# Patient Record
Sex: MALE | Race: WHITE | Employment: OTHER | URBAN - NONMETROPOLITAN AREA
[De-identification: names, ages, dates, MRNs, and addresses within clinical notes are randomized per-mention and may not be internally consistent; named-entity substitution may affect disease eponyms.]

---

## 2017-03-31 ENCOUNTER — HOSPITAL ENCOUNTER (OUTPATIENT)
Dept: GENERAL RADIOLOGY | Age: 77
Discharge: HOME | End: 2017-03-31
Payer: MEDICARE

## 2017-03-31 DIAGNOSIS — M25.562 LEFT KNEE PAIN, UNSPECIFIED CHRONICITY: ICD-10-CM

## 2017-03-31 PROCEDURE — 73562 X-RAY EXAM OF KNEE 3: CPT

## 2017-06-03 ENCOUNTER — APPOINTMENT (OUTPATIENT)
Dept: CT IMAGING | Age: 77
DRG: 392 | End: 2017-06-03
Payer: MEDICARE

## 2017-06-03 ENCOUNTER — HOSPITAL ENCOUNTER (INPATIENT)
Age: 77
LOS: 6 days | Discharge: HOME | DRG: 392 | End: 2017-06-09
Attending: EMERGENCY MEDICINE | Admitting: SURGERY
Payer: MEDICARE

## 2017-06-03 DIAGNOSIS — R10.84 GENERALIZED ABDOMINAL PAIN: Primary | ICD-10-CM

## 2017-06-03 DIAGNOSIS — K66.8 FREE INTRAPERITONEAL AIR: ICD-10-CM

## 2017-06-03 LAB
ALBUMIN SERPL-MCNC: 4.5 G/DL (ref 3.5–5.2)
ALP BLD-CCNC: 71 U/L (ref 40–130)
ALT SERPL-CCNC: 20 U/L (ref 5–41)
ANION GAP SERPL CALCULATED.3IONS-SCNC: 18 MMOL/L (ref 7–19)
AST SERPL-CCNC: 22 U/L (ref 5–40)
BASOPHILS ABSOLUTE: 0.1 K/UL (ref 0–0.2)
BASOPHILS RELATIVE PERCENT: 0.5 % (ref 0–1)
BILIRUB SERPL-MCNC: 0.7 MG/DL (ref 0.2–1.2)
BILIRUBIN URINE: NEGATIVE
BLOOD, URINE: NEGATIVE
BUN BLDV-MCNC: 31 MG/DL (ref 8–23)
CALCIUM SERPL-MCNC: 9.6 MG/DL (ref 8.8–10.2)
CHLORIDE BLD-SCNC: 95 MMOL/L (ref 98–111)
CLARITY: CLEAR
CO2: 25 MMOL/L (ref 22–29)
COLOR: YELLOW
CREAT SERPL-MCNC: 1.4 MG/DL (ref 0.5–1.2)
EOSINOPHILS ABSOLUTE: 0 K/UL (ref 0–0.6)
EOSINOPHILS RELATIVE PERCENT: 0 % (ref 0–5)
GFR NON-AFRICAN AMERICAN: 49
GLUCOSE BLD-MCNC: 180 MG/DL (ref 74–109)
GLUCOSE URINE: NEGATIVE MG/DL
HCT VFR BLD CALC: 49.3 % (ref 42–52)
HEMOGLOBIN: 16.7 G/DL (ref 14–18)
KETONES, URINE: NEGATIVE MG/DL
LEUKOCYTE ESTERASE, URINE: NEGATIVE
LYMPHOCYTES ABSOLUTE: 1 K/UL (ref 1.1–4.5)
LYMPHOCYTES RELATIVE PERCENT: 6.8 % (ref 20–40)
MCH RBC QN AUTO: 30.6 PG (ref 27–31)
MCHC RBC AUTO-ENTMCNC: 33.9 G/DL (ref 33–37)
MCV RBC AUTO: 90.5 FL (ref 80–94)
MONOCYTES ABSOLUTE: 0.6 K/UL (ref 0–0.9)
MONOCYTES RELATIVE PERCENT: 4.3 % (ref 0–10)
NEUTROPHILS ABSOLUTE: 12.2 K/UL (ref 1.5–7.5)
NEUTROPHILS RELATIVE PERCENT: 88 % (ref 50–65)
NITRITE, URINE: NEGATIVE
PDW BLD-RTO: 12.1 % (ref 11.5–14.5)
PH UA: 7
PLATELET # BLD: 176 K/UL (ref 130–400)
PMV BLD AUTO: 11.7 FL (ref 9.4–12.4)
POTASSIUM SERPL-SCNC: 3.5 MMOL/L (ref 3.5–5)
PROTEIN UA: ABNORMAL MG/DL
RBC # BLD: 5.45 M/UL (ref 4.7–6.1)
SODIUM BLD-SCNC: 138 MMOL/L (ref 136–145)
SPECIFIC GRAVITY UA: 1.02
TOTAL PROTEIN: 7.8 G/DL (ref 6.6–8.7)
UROBILINOGEN, URINE: 1 E.U./DL
WBC # BLD: 13.9 K/UL (ref 4.8–10.8)

## 2017-06-03 PROCEDURE — 94762 N-INVAS EAR/PLS OXIMTRY CONT: CPT

## 2017-06-03 PROCEDURE — 6360000002 HC RX W HCPCS: Performed by: SURGERY

## 2017-06-03 PROCEDURE — 2700000000 HC OXYGEN THERAPY PER DAY

## 2017-06-03 PROCEDURE — 80053 COMPREHEN METABOLIC PANEL: CPT

## 2017-06-03 PROCEDURE — 2580000003 HC RX 258: Performed by: EMERGENCY MEDICINE

## 2017-06-03 PROCEDURE — 36415 COLL VENOUS BLD VENIPUNCTURE: CPT

## 2017-06-03 PROCEDURE — 99223 1ST HOSP IP/OBS HIGH 75: CPT | Performed by: SURGERY

## 2017-06-03 PROCEDURE — 6370000000 HC RX 637 (ALT 250 FOR IP): Performed by: SURGERY

## 2017-06-03 PROCEDURE — 85025 COMPLETE CBC W/AUTO DIFF WBC: CPT

## 2017-06-03 PROCEDURE — 81003 URINALYSIS AUTO W/O SCOPE: CPT

## 2017-06-03 PROCEDURE — 74176 CT ABD & PELVIS W/O CONTRAST: CPT

## 2017-06-03 PROCEDURE — 2500000003 HC RX 250 WO HCPCS: Performed by: EMERGENCY MEDICINE

## 2017-06-03 PROCEDURE — 6360000002 HC RX W HCPCS: Performed by: EMERGENCY MEDICINE

## 2017-06-03 PROCEDURE — 96375 TX/PRO/DX INJ NEW DRUG ADDON: CPT

## 2017-06-03 PROCEDURE — 99284 EMERGENCY DEPT VISIT MOD MDM: CPT | Performed by: EMERGENCY MEDICINE

## 2017-06-03 PROCEDURE — 96374 THER/PROPH/DIAG INJ IV PUSH: CPT

## 2017-06-03 PROCEDURE — 99285 EMERGENCY DEPT VISIT HI MDM: CPT

## 2017-06-03 PROCEDURE — 1210000000 HC MED SURG R&B

## 2017-06-03 RX ORDER — SODIUM CHLORIDE 9 MG/ML
INJECTION, SOLUTION INTRAVENOUS CONTINUOUS
Status: DISCONTINUED | OUTPATIENT
Start: 2017-06-03 | End: 2017-06-08

## 2017-06-03 RX ORDER — NALOXONE HYDROCHLORIDE 0.4 MG/ML
INJECTION, SOLUTION INTRAMUSCULAR; INTRAVENOUS; SUBCUTANEOUS
Status: DISPENSED
Start: 2017-06-03 | End: 2017-06-04

## 2017-06-03 RX ORDER — ONDANSETRON 2 MG/ML
4 INJECTION INTRAMUSCULAR; INTRAVENOUS ONCE
Status: COMPLETED | OUTPATIENT
Start: 2017-06-03 | End: 2017-06-03

## 2017-06-03 RX ORDER — PANTOPRAZOLE SODIUM 40 MG/1
40 TABLET, DELAYED RELEASE ORAL DAILY
COMMUNITY
End: 2018-12-11

## 2017-06-03 RX ORDER — SODIUM CHLORIDE 0.9 % (FLUSH) 0.9 %
10 SYRINGE (ML) INJECTION EVERY 12 HOURS SCHEDULED
Status: DISCONTINUED | OUTPATIENT
Start: 2017-06-03 | End: 2017-06-09 | Stop reason: HOSPADM

## 2017-06-03 RX ORDER — PROMETHAZINE HYDROCHLORIDE 25 MG/ML
6.25 INJECTION, SOLUTION INTRAMUSCULAR; INTRAVENOUS EVERY 4 HOURS PRN
Status: DISCONTINUED | OUTPATIENT
Start: 2017-06-03 | End: 2017-06-03

## 2017-06-03 RX ORDER — METOPROLOL SUCCINATE 100 MG/1
100 TABLET, EXTENDED RELEASE ORAL DAILY
Status: ON HOLD | COMMUNITY
End: 2021-01-29 | Stop reason: HOSPADM

## 2017-06-03 RX ORDER — SODIUM CHLORIDE 0.9 % (FLUSH) 0.9 %
10 SYRINGE (ML) INJECTION PRN
Status: DISCONTINUED | OUTPATIENT
Start: 2017-06-03 | End: 2017-06-09 | Stop reason: HOSPADM

## 2017-06-03 RX ORDER — METRONIDAZOLE 500 MG/1
500 TABLET ORAL EVERY 8 HOURS SCHEDULED
Status: DISCONTINUED | OUTPATIENT
Start: 2017-06-03 | End: 2017-06-09 | Stop reason: HOSPADM

## 2017-06-03 RX ORDER — PROMETHAZINE HYDROCHLORIDE 25 MG/ML
12.5 INJECTION, SOLUTION INTRAMUSCULAR; INTRAVENOUS EVERY 4 HOURS PRN
Status: DISCONTINUED | OUTPATIENT
Start: 2017-06-03 | End: 2017-06-03

## 2017-06-03 RX ORDER — NIFEDIPINE 60 MG/1
60 TABLET, EXTENDED RELEASE ORAL DAILY
Status: ON HOLD | COMMUNITY
End: 2021-01-29 | Stop reason: HOSPADM

## 2017-06-03 RX ORDER — ONDANSETRON 2 MG/ML
4 INJECTION INTRAMUSCULAR; INTRAVENOUS EVERY 6 HOURS PRN
Status: DISCONTINUED | OUTPATIENT
Start: 2017-06-03 | End: 2017-06-09 | Stop reason: HOSPADM

## 2017-06-03 RX ORDER — PROMETHAZINE HYDROCHLORIDE 25 MG/ML
12.5 INJECTION, SOLUTION INTRAMUSCULAR; INTRAVENOUS EVERY 6 HOURS PRN
Status: DISCONTINUED | OUTPATIENT
Start: 2017-06-03 | End: 2017-06-03

## 2017-06-03 RX ORDER — ACETAMINOPHEN 325 MG/1
650 TABLET ORAL EVERY 4 HOURS PRN
Status: DISCONTINUED | OUTPATIENT
Start: 2017-06-03 | End: 2017-06-09 | Stop reason: HOSPADM

## 2017-06-03 RX ORDER — HYDROCHLOROTHIAZIDE 25 MG/1
25 TABLET ORAL DAILY
COMMUNITY
End: 2017-07-06 | Stop reason: ALTCHOICE

## 2017-06-03 RX ORDER — KETOROLAC TROMETHAMINE 30 MG/ML
30 INJECTION, SOLUTION INTRAMUSCULAR; INTRAVENOUS ONCE
Status: COMPLETED | OUTPATIENT
Start: 2017-06-03 | End: 2017-06-03

## 2017-06-03 RX ADMIN — METRONIDAZOLE 500 MG: 500 TABLET ORAL at 22:42

## 2017-06-03 RX ADMIN — ONDANSETRON 4 MG: 2 INJECTION INTRAMUSCULAR; INTRAVENOUS at 14:04

## 2017-06-03 RX ADMIN — PROMETHAZINE HYDROCHLORIDE 12.5 MG: 25 INJECTION, SOLUTION INTRAMUSCULAR; INTRAVENOUS at 15:36

## 2017-06-03 RX ADMIN — HYDROMORPHONE HYDROCHLORIDE 0.5 MG: 1 INJECTION, SOLUTION INTRAMUSCULAR; INTRAVENOUS; SUBCUTANEOUS at 15:17

## 2017-06-03 RX ADMIN — HYDROMORPHONE HYDROCHLORIDE 0.5 MG: 1 INJECTION, SOLUTION INTRAMUSCULAR; INTRAVENOUS; SUBCUTANEOUS at 14:04

## 2017-06-03 RX ADMIN — SODIUM CHLORIDE 1000 MG: 9 INJECTION, SOLUTION INTRAVENOUS at 18:30

## 2017-06-03 RX ADMIN — ENOXAPARIN SODIUM 40 MG: 40 INJECTION SUBCUTANEOUS at 19:08

## 2017-06-03 RX ADMIN — KETOROLAC TROMETHAMINE 30 MG: 30 INJECTION, SOLUTION INTRAMUSCULAR at 11:40

## 2017-06-03 RX ADMIN — SODIUM CHLORIDE: 9 INJECTION, SOLUTION INTRAVENOUS at 22:42

## 2017-06-03 RX ADMIN — ONDANSETRON 4 MG: 2 INJECTION INTRAMUSCULAR; INTRAVENOUS at 11:40

## 2017-06-03 RX ADMIN — SODIUM CHLORIDE: 9 INJECTION, SOLUTION INTRAVENOUS at 13:06

## 2017-06-03 RX ADMIN — METRONIDAZOLE 500 MG: 500 INJECTION, SOLUTION INTRAVENOUS at 14:05

## 2017-06-03 ASSESSMENT — PAIN DESCRIPTION - DIRECTION: RADIATING_TOWARDS: TESTICLES

## 2017-06-03 ASSESSMENT — PAIN DESCRIPTION - LOCATION: LOCATION: ABDOMEN;GROIN

## 2017-06-03 ASSESSMENT — PAIN SCALES - GENERAL
PAINLEVEL_OUTOF10: 10
PAINLEVEL_OUTOF10: 10
PAINLEVEL_OUTOF10: 0
PAINLEVEL_OUTOF10: 10

## 2017-06-03 ASSESSMENT — PAIN DESCRIPTION - FREQUENCY: FREQUENCY: CONTINUOUS

## 2017-06-03 ASSESSMENT — PAIN DESCRIPTION - PROGRESSION: CLINICAL_PROGRESSION: NOT CHANGED

## 2017-06-03 ASSESSMENT — ENCOUNTER SYMPTOMS
CONSTIPATION: 1
ABDOMINAL PAIN: 1

## 2017-06-03 ASSESSMENT — PAIN DESCRIPTION - ORIENTATION: ORIENTATION: LOWER

## 2017-06-03 ASSESSMENT — PAIN DESCRIPTION - ONSET: ONSET: SUDDEN

## 2017-06-03 ASSESSMENT — PAIN DESCRIPTION - PAIN TYPE: TYPE: ACUTE PAIN

## 2017-06-04 PROBLEM — K57.20 DIVERTICULITIS OF LARGE INTESTINE WITH PERFORATION WITHOUT BLEEDING: Status: ACTIVE | Noted: 2017-06-04

## 2017-06-04 LAB
ALBUMIN SERPL-MCNC: 3.2 G/DL (ref 3.5–5.2)
ALP BLD-CCNC: 41 U/L (ref 40–130)
ALT SERPL-CCNC: 15 U/L (ref 5–41)
ANION GAP SERPL CALCULATED.3IONS-SCNC: 18 MMOL/L (ref 7–19)
AST SERPL-CCNC: 24 U/L (ref 5–40)
BASOPHILS ABSOLUTE: 0 K/UL (ref 0–0.2)
BASOPHILS MANUAL: 0 %
BASOPHILS RELATIVE PERCENT: 0 % (ref 0–1)
BILIRUB SERPL-MCNC: 0.9 MG/DL (ref 0.2–1.2)
BUN BLDV-MCNC: 37 MG/DL (ref 8–23)
CALCIUM SERPL-MCNC: 8.2 MG/DL (ref 8.8–10.2)
CHLORIDE BLD-SCNC: 99 MMOL/L (ref 98–111)
CO2: 22 MMOL/L (ref 22–29)
CREAT SERPL-MCNC: 1.8 MG/DL (ref 0.5–1.2)
EOSINOPHILS ABSOLUTE: 0 K/UL (ref 0–0.6)
EOSINOPHILS RELATIVE PERCENT: 0 % (ref 0–5)
GFR NON-AFRICAN AMERICAN: 37
GLUCOSE BLD-MCNC: 154 MG/DL (ref 74–109)
HCT VFR BLD CALC: 42.2 % (ref 42–52)
HEMOGLOBIN: 14.3 G/DL (ref 14–18)
LYMPHOCYTES ABSOLUTE: 0.8 K/UL (ref 1.1–4.5)
LYMPHOCYTES RELATIVE PERCENT: 8 % (ref 20–40)
MCH RBC QN AUTO: 31.2 PG (ref 27–31)
MCHC RBC AUTO-ENTMCNC: 33.9 G/DL (ref 33–37)
MCV RBC AUTO: 92.1 FL (ref 80–94)
MONOCYTES ABSOLUTE: 0.6 K/UL (ref 0–0.9)
MONOCYTES RELATIVE PERCENT: 6 % (ref 0–10)
NEUTROPHILS ABSOLUTE: 8.7 K/UL (ref 1.5–7.5)
NEUTROPHILS MANUAL: 86 %
NEUTROPHILS RELATIVE PERCENT: 86 % (ref 50–65)
PDW BLD-RTO: 12.4 % (ref 11.5–14.5)
PLATELET # BLD: 163 K/UL (ref 130–400)
PLATELET SLIDE REVIEW: ADEQUATE
PMV BLD AUTO: 12.7 FL (ref 9.4–12.4)
POTASSIUM SERPL-SCNC: 3.6 MMOL/L (ref 3.5–5)
RBC # BLD: 4.58 M/UL (ref 4.7–6.1)
SODIUM BLD-SCNC: 139 MMOL/L (ref 136–145)
TOTAL PROTEIN: 5.7 G/DL (ref 6.6–8.7)
WBC # BLD: 10.1 K/UL (ref 4.8–10.8)

## 2017-06-04 PROCEDURE — 6360000002 HC RX W HCPCS: Performed by: FAMILY MEDICINE

## 2017-06-04 PROCEDURE — 85025 COMPLETE CBC W/AUTO DIFF WBC: CPT

## 2017-06-04 PROCEDURE — 99232 SBSQ HOSP IP/OBS MODERATE 35: CPT | Performed by: SURGERY

## 2017-06-04 PROCEDURE — C9113 INJ PANTOPRAZOLE SODIUM, VIA: HCPCS | Performed by: FAMILY MEDICINE

## 2017-06-04 PROCEDURE — 6360000002 HC RX W HCPCS: Performed by: EMERGENCY MEDICINE

## 2017-06-04 PROCEDURE — 2580000003 HC RX 258: Performed by: EMERGENCY MEDICINE

## 2017-06-04 PROCEDURE — 80053 COMPREHEN METABOLIC PANEL: CPT

## 2017-06-04 PROCEDURE — 36415 COLL VENOUS BLD VENIPUNCTURE: CPT

## 2017-06-04 PROCEDURE — 1210000000 HC MED SURG R&B

## 2017-06-04 PROCEDURE — 2580000003 HC RX 258: Performed by: FAMILY MEDICINE

## 2017-06-04 PROCEDURE — 94762 N-INVAS EAR/PLS OXIMTRY CONT: CPT

## 2017-06-04 PROCEDURE — 2700000000 HC OXYGEN THERAPY PER DAY

## 2017-06-04 PROCEDURE — 6370000000 HC RX 637 (ALT 250 FOR IP): Performed by: SURGERY

## 2017-06-04 PROCEDURE — 6360000002 HC RX W HCPCS: Performed by: SURGERY

## 2017-06-04 PROCEDURE — 2580000003 HC RX 258: Performed by: SURGERY

## 2017-06-04 RX ORDER — 0.9 % SODIUM CHLORIDE 0.9 %
1000 INTRAVENOUS SOLUTION INTRAVENOUS ONCE
Status: COMPLETED | OUTPATIENT
Start: 2017-06-04 | End: 2017-06-04

## 2017-06-04 RX ORDER — PANTOPRAZOLE SODIUM 40 MG/10ML
40 INJECTION, POWDER, LYOPHILIZED, FOR SOLUTION INTRAVENOUS DAILY
Status: DISCONTINUED | OUTPATIENT
Start: 2017-06-04 | End: 2017-06-05

## 2017-06-04 RX ORDER — METOPROLOL TARTRATE 5 MG/5ML
2.5 INJECTION INTRAVENOUS EVERY 6 HOURS PRN
Status: DISCONTINUED | OUTPATIENT
Start: 2017-06-04 | End: 2017-06-09 | Stop reason: HOSPADM

## 2017-06-04 RX ADMIN — ONDANSETRON 4 MG: 2 INJECTION INTRAMUSCULAR; INTRAVENOUS at 01:55

## 2017-06-04 RX ADMIN — PANTOPRAZOLE SODIUM 40 MG: 40 INJECTION, POWDER, FOR SOLUTION INTRAVENOUS at 13:45

## 2017-06-04 RX ADMIN — HYDROMORPHONE HYDROCHLORIDE 0.5 MG: 1 INJECTION, SOLUTION INTRAMUSCULAR; INTRAVENOUS; SUBCUTANEOUS at 01:56

## 2017-06-04 RX ADMIN — METRONIDAZOLE 500 MG: 500 TABLET ORAL at 13:38

## 2017-06-04 RX ADMIN — ENOXAPARIN SODIUM 40 MG: 40 INJECTION SUBCUTANEOUS at 20:35

## 2017-06-04 RX ADMIN — SODIUM CHLORIDE 1000 MG: 9 INJECTION, SOLUTION INTRAVENOUS at 17:03

## 2017-06-04 RX ADMIN — METRONIDAZOLE 500 MG: 500 TABLET ORAL at 05:55

## 2017-06-04 RX ADMIN — HYDROMORPHONE HYDROCHLORIDE 0.5 MG: 1 INJECTION, SOLUTION INTRAMUSCULAR; INTRAVENOUS; SUBCUTANEOUS at 13:39

## 2017-06-04 RX ADMIN — METRONIDAZOLE 500 MG: 500 TABLET ORAL at 22:00

## 2017-06-04 RX ADMIN — ONDANSETRON 4 MG: 2 INJECTION INTRAMUSCULAR; INTRAVENOUS at 20:35

## 2017-06-04 RX ADMIN — SODIUM CHLORIDE 1000 ML: 9 INJECTION, SOLUTION INTRAVENOUS at 10:00

## 2017-06-04 RX ADMIN — HYDROMORPHONE HYDROCHLORIDE 0.5 MG: 1 INJECTION, SOLUTION INTRAMUSCULAR; INTRAVENOUS; SUBCUTANEOUS at 07:37

## 2017-06-04 RX ADMIN — SODIUM CHLORIDE: 9 INJECTION, SOLUTION INTRAVENOUS at 07:38

## 2017-06-04 RX ADMIN — HYDROMORPHONE HYDROCHLORIDE 0.5 MG: 1 INJECTION, SOLUTION INTRAMUSCULAR; INTRAVENOUS; SUBCUTANEOUS at 20:35

## 2017-06-04 ASSESSMENT — PAIN DESCRIPTION - ORIENTATION: ORIENTATION: RIGHT

## 2017-06-04 ASSESSMENT — PAIN SCALES - GENERAL
PAINLEVEL_OUTOF10: 6
PAINLEVEL_OUTOF10: 3
PAINLEVEL_OUTOF10: 3
PAINLEVEL_OUTOF10: 6
PAINLEVEL_OUTOF10: 5
PAINLEVEL_OUTOF10: 4

## 2017-06-04 ASSESSMENT — PAIN DESCRIPTION - LOCATION: LOCATION: ABDOMEN

## 2017-06-04 ASSESSMENT — PAIN DESCRIPTION - PAIN TYPE: TYPE: ACUTE PAIN

## 2017-06-05 ENCOUNTER — APPOINTMENT (OUTPATIENT)
Dept: ULTRASOUND IMAGING | Age: 77
DRG: 392 | End: 2017-06-05
Payer: MEDICARE

## 2017-06-05 LAB
ALBUMIN SERPL-MCNC: 3.1 G/DL (ref 3.5–5.2)
ALP BLD-CCNC: 46 U/L (ref 40–130)
ALT SERPL-CCNC: 22 U/L (ref 5–41)
ANION GAP SERPL CALCULATED.3IONS-SCNC: 17 MMOL/L (ref 7–19)
AST SERPL-CCNC: 97 U/L (ref 5–40)
BACTERIA: NEGATIVE /HPF
BASOPHILS ABSOLUTE: 0.1 K/UL (ref 0–0.2)
BASOPHILS RELATIVE PERCENT: 0.4 % (ref 0–1)
BILIRUB SERPL-MCNC: 0.7 MG/DL (ref 0.2–1.2)
BILIRUBIN URINE: ABNORMAL
BLOOD, URINE: ABNORMAL
BUN BLDV-MCNC: 57 MG/DL (ref 8–23)
CALCIUM SERPL-MCNC: 8.1 MG/DL (ref 8.8–10.2)
CHLORIDE BLD-SCNC: 97 MMOL/L (ref 98–111)
CLARITY: CLEAR
CO2: 25 MMOL/L (ref 22–29)
COLOR: ABNORMAL
CREAT SERPL-MCNC: 2.8 MG/DL (ref 0.5–1.2)
CREATININE URINE: 159.8 MG/DL (ref 4.2–622)
EOSINOPHIL,URINE: NORMAL
EOSINOPHILS ABSOLUTE: 0 K/UL (ref 0–0.6)
EOSINOPHILS RELATIVE PERCENT: 0 % (ref 0–5)
EPITHELIAL CELLS, UA: 0 /HPF (ref 0–5)
GFR NON-AFRICAN AMERICAN: 22
GLUCOSE BLD-MCNC: 135 MG/DL (ref 74–109)
GLUCOSE URINE: NEGATIVE MG/DL
HCT VFR BLD CALC: 45 % (ref 42–52)
HEMOGLOBIN: 14.6 G/DL (ref 14–18)
HYALINE CASTS: 1 /HPF (ref 0–8)
KETONES, URINE: NEGATIVE MG/DL
LEUKOCYTE ESTERASE, URINE: NEGATIVE
LYMPHOCYTES ABSOLUTE: 0.7 K/UL (ref 1.1–4.5)
LYMPHOCYTES RELATIVE PERCENT: 5.7 % (ref 20–40)
MCH RBC QN AUTO: 31.1 PG (ref 27–31)
MCHC RBC AUTO-ENTMCNC: 32.4 G/DL (ref 33–37)
MCV RBC AUTO: 95.9 FL (ref 80–94)
MONOCYTES ABSOLUTE: 0.4 K/UL (ref 0–0.9)
MONOCYTES RELATIVE PERCENT: 2.9 % (ref 0–10)
NEUTROPHILS ABSOLUTE: 11.6 K/UL (ref 1.5–7.5)
NEUTROPHILS RELATIVE PERCENT: 90 % (ref 50–65)
NITRITE, URINE: NEGATIVE
PDW BLD-RTO: 12.7 % (ref 11.5–14.5)
PH UA: 5.5
PLATELET # BLD: 143 K/UL (ref 130–400)
PMV BLD AUTO: 12.8 FL (ref 9.4–12.4)
POTASSIUM SERPL-SCNC: 4.3 MMOL/L (ref 3.5–5)
PROTEIN PROTEIN: 63 MG/DL (ref 15–45)
PROTEIN UA: 30 MG/DL
RBC # BLD: 4.69 M/UL (ref 4.7–6.1)
RBC UA: 8 /HPF (ref 0–4)
SODIUM BLD-SCNC: 139 MMOL/L (ref 136–145)
SODIUM URINE: 22 MMOL/L
SPECIFIC GRAVITY UA: 1.02
TOTAL PROTEIN: 6.3 G/DL (ref 6.6–8.7)
UREA NITROGEN, UR: 1113 MG/DL
UROBILINOGEN, URINE: 0.2 E.U./DL
WBC # BLD: 12.9 K/UL (ref 4.8–10.8)
WBC UA: 0 /HPF (ref 0–5)

## 2017-06-05 PROCEDURE — 1210000000 HC MED SURG R&B

## 2017-06-05 PROCEDURE — 85025 COMPLETE CBC W/AUTO DIFF WBC: CPT

## 2017-06-05 PROCEDURE — 76770 US EXAM ABDO BACK WALL COMP: CPT

## 2017-06-05 PROCEDURE — 2580000003 HC RX 258: Performed by: EMERGENCY MEDICINE

## 2017-06-05 PROCEDURE — 80053 COMPREHEN METABOLIC PANEL: CPT

## 2017-06-05 PROCEDURE — 2700000000 HC OXYGEN THERAPY PER DAY

## 2017-06-05 PROCEDURE — C9113 INJ PANTOPRAZOLE SODIUM, VIA: HCPCS | Performed by: SURGERY

## 2017-06-05 PROCEDURE — 84300 ASSAY OF URINE SODIUM: CPT

## 2017-06-05 PROCEDURE — 84156 ASSAY OF PROTEIN URINE: CPT

## 2017-06-05 PROCEDURE — 6370000000 HC RX 637 (ALT 250 FOR IP): Performed by: SURGERY

## 2017-06-05 PROCEDURE — 82570 ASSAY OF URINE CREATININE: CPT

## 2017-06-05 PROCEDURE — 36415 COLL VENOUS BLD VENIPUNCTURE: CPT

## 2017-06-05 PROCEDURE — 2580000003 HC RX 258: Performed by: INTERNAL MEDICINE

## 2017-06-05 PROCEDURE — 99232 SBSQ HOSP IP/OBS MODERATE 35: CPT | Performed by: SURGERY

## 2017-06-05 PROCEDURE — 84540 ASSAY OF URINE/UREA-N: CPT

## 2017-06-05 PROCEDURE — 81001 URINALYSIS AUTO W/SCOPE: CPT

## 2017-06-05 PROCEDURE — 6360000002 HC RX W HCPCS: Performed by: SURGERY

## 2017-06-05 PROCEDURE — 2580000003 HC RX 258: Performed by: SURGERY

## 2017-06-05 PROCEDURE — 89050 BODY FLUID CELL COUNT: CPT

## 2017-06-05 PROCEDURE — 6360000002 HC RX W HCPCS: Performed by: EMERGENCY MEDICINE

## 2017-06-05 RX ORDER — PANTOPRAZOLE SODIUM 40 MG/10ML
40 INJECTION, POWDER, LYOPHILIZED, FOR SOLUTION INTRAVENOUS 2 TIMES DAILY
Status: DISCONTINUED | OUTPATIENT
Start: 2017-06-05 | End: 2017-06-09 | Stop reason: HOSPADM

## 2017-06-05 RX ORDER — MAGNESIUM HYDROXIDE/ALUMINUM HYDROXICE/SIMETHICONE 120; 1200; 1200 MG/30ML; MG/30ML; MG/30ML
30 SUSPENSION ORAL ONCE
Status: COMPLETED | OUTPATIENT
Start: 2017-06-05 | End: 2017-06-05

## 2017-06-05 RX ADMIN — HYDROMORPHONE HYDROCHLORIDE 0.5 MG: 1 INJECTION, SOLUTION INTRAMUSCULAR; INTRAVENOUS; SUBCUTANEOUS at 21:50

## 2017-06-05 RX ADMIN — ONDANSETRON 4 MG: 2 INJECTION INTRAMUSCULAR; INTRAVENOUS at 13:46

## 2017-06-05 RX ADMIN — ENOXAPARIN SODIUM 30 MG: 30 INJECTION SUBCUTANEOUS at 21:50

## 2017-06-05 RX ADMIN — Medication 10 ML: at 09:52

## 2017-06-05 RX ADMIN — HYDROMORPHONE HYDROCHLORIDE 0.5 MG: 1 INJECTION, SOLUTION INTRAMUSCULAR; INTRAVENOUS; SUBCUTANEOUS at 17:11

## 2017-06-05 RX ADMIN — ONDANSETRON 4 MG: 2 INJECTION INTRAMUSCULAR; INTRAVENOUS at 05:25

## 2017-06-05 RX ADMIN — METRONIDAZOLE 500 MG: 500 TABLET ORAL at 13:39

## 2017-06-05 RX ADMIN — SODIUM CHLORIDE: 9 INJECTION, SOLUTION INTRAVENOUS at 22:42

## 2017-06-05 RX ADMIN — SODIUM CHLORIDE: 9 INJECTION, SOLUTION INTRAVENOUS at 13:39

## 2017-06-05 RX ADMIN — PANTOPRAZOLE SODIUM 40 MG: 40 INJECTION, POWDER, FOR SOLUTION INTRAVENOUS at 09:52

## 2017-06-05 RX ADMIN — METRONIDAZOLE 500 MG: 500 TABLET ORAL at 05:25

## 2017-06-05 RX ADMIN — SODIUM CHLORIDE 500 MG: 9 INJECTION, SOLUTION INTRAVENOUS at 19:21

## 2017-06-05 RX ADMIN — SODIUM CHLORIDE: 9 INJECTION, SOLUTION INTRAVENOUS at 05:32

## 2017-06-05 RX ADMIN — ONDANSETRON 4 MG: 2 INJECTION INTRAMUSCULAR; INTRAVENOUS at 21:50

## 2017-06-05 RX ADMIN — HYDROMORPHONE HYDROCHLORIDE 0.5 MG: 1 INJECTION, SOLUTION INTRAMUSCULAR; INTRAVENOUS; SUBCUTANEOUS at 12:13

## 2017-06-05 RX ADMIN — ONDANSETRON 4 MG: 2 INJECTION INTRAMUSCULAR; INTRAVENOUS at 17:11

## 2017-06-05 RX ADMIN — METRONIDAZOLE 500 MG: 500 TABLET ORAL at 21:50

## 2017-06-05 RX ADMIN — PANTOPRAZOLE SODIUM 40 MG: 40 INJECTION, POWDER, FOR SOLUTION INTRAVENOUS at 21:51

## 2017-06-05 RX ADMIN — ALUMINUM HYDROXIDE, MAGNESIUM HYDROXIDE, AND SIMETHICONE 30 ML: 200; 200; 20 SUSPENSION ORAL at 09:49

## 2017-06-05 ASSESSMENT — PAIN SCALES - GENERAL
PAINLEVEL_OUTOF10: 7
PAINLEVEL_OUTOF10: 5
PAINLEVEL_OUTOF10: 7

## 2017-06-06 LAB
ALBUMIN SERPL-MCNC: 2.9 G/DL (ref 3.5–5.2)
ALP BLD-CCNC: 52 U/L (ref 40–130)
ALT SERPL-CCNC: 27 U/L (ref 5–41)
ANION GAP SERPL CALCULATED.3IONS-SCNC: 21 MMOL/L (ref 7–19)
AST SERPL-CCNC: 102 U/L (ref 5–40)
BASOPHILS ABSOLUTE: 0.1 K/UL (ref 0–0.2)
BASOPHILS RELATIVE PERCENT: 0.4 % (ref 0–1)
BILIRUB SERPL-MCNC: 0.4 MG/DL (ref 0.2–1.2)
BUN BLDV-MCNC: 55 MG/DL (ref 8–23)
CALCIUM SERPL-MCNC: 7.8 MG/DL (ref 8.8–10.2)
CHLORIDE BLD-SCNC: 102 MMOL/L (ref 98–111)
CO2: 18 MMOL/L (ref 22–29)
CREAT SERPL-MCNC: 1.8 MG/DL (ref 0.5–1.2)
EOSINOPHILS ABSOLUTE: 0 K/UL (ref 0–0.6)
EOSINOPHILS RELATIVE PERCENT: 0 % (ref 0–5)
GFR NON-AFRICAN AMERICAN: 37
GLUCOSE BLD-MCNC: 141 MG/DL (ref 74–109)
HCT VFR BLD CALC: 40.3 % (ref 42–52)
HEMOGLOBIN: 14.1 G/DL (ref 14–18)
LACTIC ACID: 1 MG/DL (ref 0.5–1.9)
LYMPHOCYTES ABSOLUTE: 0.7 K/UL (ref 1.1–4.5)
LYMPHOCYTES RELATIVE PERCENT: 4.3 % (ref 20–40)
MAGNESIUM: 1.7 MG/DL (ref 1.6–2.4)
MCH RBC QN AUTO: 31.5 PG (ref 27–31)
MCHC RBC AUTO-ENTMCNC: 35 G/DL (ref 33–37)
MCV RBC AUTO: 90 FL (ref 80–94)
MONOCYTES ABSOLUTE: 0.7 K/UL (ref 0–0.9)
MONOCYTES RELATIVE PERCENT: 4.2 % (ref 0–10)
NEUTROPHILS ABSOLUTE: 15.4 K/UL (ref 1.5–7.5)
NEUTROPHILS RELATIVE PERCENT: 90.6 % (ref 50–65)
PARATHYROID HORMONE INTACT: 87.2 PG/ML (ref 15–65)
PDW BLD-RTO: 12.5 % (ref 11.5–14.5)
PHOSPHORUS: 3.4 MG/DL (ref 2.5–4.5)
PLATELET # BLD: 129 K/UL (ref 130–400)
PMV BLD AUTO: 12.6 FL (ref 9.4–12.4)
POTASSIUM SERPL-SCNC: 4.3 MMOL/L (ref 3.5–5)
RBC # BLD: 4.48 M/UL (ref 4.7–6.1)
SODIUM BLD-SCNC: 141 MMOL/L (ref 136–145)
TOTAL PROTEIN: 5.2 G/DL (ref 6.6–8.7)
URIC ACID, SERUM: 8.2 MG/DL (ref 3.4–7)
VITAMIN D 25-HYDROXY: 33.1 NG/ML
WBC # BLD: 16.9 K/UL (ref 4.8–10.8)

## 2017-06-06 PROCEDURE — 84550 ASSAY OF BLOOD/URIC ACID: CPT

## 2017-06-06 PROCEDURE — 6370000000 HC RX 637 (ALT 250 FOR IP): Performed by: INTERNAL MEDICINE

## 2017-06-06 PROCEDURE — 84100 ASSAY OF PHOSPHORUS: CPT

## 2017-06-06 PROCEDURE — 99231 SBSQ HOSP IP/OBS SF/LOW 25: CPT | Performed by: SURGERY

## 2017-06-06 PROCEDURE — 94762 N-INVAS EAR/PLS OXIMTRY CONT: CPT

## 2017-06-06 PROCEDURE — C9113 INJ PANTOPRAZOLE SODIUM, VIA: HCPCS | Performed by: SURGERY

## 2017-06-06 PROCEDURE — 82306 VITAMIN D 25 HYDROXY: CPT

## 2017-06-06 PROCEDURE — 2580000003 HC RX 258: Performed by: INTERNAL MEDICINE

## 2017-06-06 PROCEDURE — 2700000000 HC OXYGEN THERAPY PER DAY

## 2017-06-06 PROCEDURE — 83735 ASSAY OF MAGNESIUM: CPT

## 2017-06-06 PROCEDURE — 83970 ASSAY OF PARATHORMONE: CPT

## 2017-06-06 PROCEDURE — 36415 COLL VENOUS BLD VENIPUNCTURE: CPT

## 2017-06-06 PROCEDURE — 1210000000 HC MED SURG R&B

## 2017-06-06 PROCEDURE — 83605 ASSAY OF LACTIC ACID: CPT

## 2017-06-06 PROCEDURE — 80053 COMPREHEN METABOLIC PANEL: CPT

## 2017-06-06 PROCEDURE — 6360000002 HC RX W HCPCS: Performed by: SURGERY

## 2017-06-06 PROCEDURE — 2580000003 HC RX 258: Performed by: SURGERY

## 2017-06-06 PROCEDURE — 6370000000 HC RX 637 (ALT 250 FOR IP): Performed by: SURGERY

## 2017-06-06 PROCEDURE — 85025 COMPLETE CBC W/AUTO DIFF WBC: CPT

## 2017-06-06 RX ORDER — SODIUM BICARBONATE 650 MG/1
325 TABLET ORAL 2 TIMES DAILY
Status: DISCONTINUED | OUTPATIENT
Start: 2017-06-06 | End: 2017-06-09 | Stop reason: HOSPADM

## 2017-06-06 RX ADMIN — Medication 10 ML: at 09:30

## 2017-06-06 RX ADMIN — PANTOPRAZOLE SODIUM 40 MG: 40 INJECTION, POWDER, FOR SOLUTION INTRAVENOUS at 09:30

## 2017-06-06 RX ADMIN — METRONIDAZOLE 500 MG: 500 TABLET ORAL at 14:20

## 2017-06-06 RX ADMIN — ONDANSETRON 4 MG: 2 INJECTION INTRAMUSCULAR; INTRAVENOUS at 20:37

## 2017-06-06 RX ADMIN — HYDROMORPHONE HYDROCHLORIDE 0.5 MG: 1 INJECTION, SOLUTION INTRAMUSCULAR; INTRAVENOUS; SUBCUTANEOUS at 04:24

## 2017-06-06 RX ADMIN — HYDROMORPHONE HYDROCHLORIDE 0.5 MG: 1 INJECTION, SOLUTION INTRAMUSCULAR; INTRAVENOUS; SUBCUTANEOUS at 23:33

## 2017-06-06 RX ADMIN — METRONIDAZOLE 500 MG: 500 TABLET ORAL at 22:20

## 2017-06-06 RX ADMIN — SODIUM CHLORIDE: 9 INJECTION, SOLUTION INTRAVENOUS at 11:45

## 2017-06-06 RX ADMIN — SODIUM CHLORIDE 1000 MG: 9 INJECTION, SOLUTION INTRAVENOUS at 19:37

## 2017-06-06 RX ADMIN — SODIUM BICARBONATE 325 MG: 650 TABLET ORAL at 20:30

## 2017-06-06 RX ADMIN — ONDANSETRON 4 MG: 2 INJECTION INTRAMUSCULAR; INTRAVENOUS at 04:24

## 2017-06-06 RX ADMIN — PANTOPRAZOLE SODIUM 40 MG: 40 INJECTION, POWDER, FOR SOLUTION INTRAVENOUS at 20:31

## 2017-06-06 RX ADMIN — ENOXAPARIN SODIUM 40 MG: 40 INJECTION SUBCUTANEOUS at 20:31

## 2017-06-06 RX ADMIN — METRONIDAZOLE 500 MG: 500 TABLET ORAL at 05:58

## 2017-06-06 RX ADMIN — SODIUM BICARBONATE 325 MG: 650 TABLET ORAL at 12:00

## 2017-06-06 RX ADMIN — SODIUM CHLORIDE: 9 INJECTION, SOLUTION INTRAVENOUS at 19:23

## 2017-06-06 RX ADMIN — SODIUM CHLORIDE: 9 INJECTION, SOLUTION INTRAVENOUS at 04:52

## 2017-06-06 ASSESSMENT — PAIN SCALES - GENERAL
PAINLEVEL_OUTOF10: 5
PAINLEVEL_OUTOF10: 6

## 2017-06-07 LAB
ALBUMIN SERPL-MCNC: 2.9 G/DL (ref 3.5–5.2)
ALP BLD-CCNC: 72 U/L (ref 40–130)
ALT SERPL-CCNC: 26 U/L (ref 5–41)
ANION GAP SERPL CALCULATED.3IONS-SCNC: 17 MMOL/L (ref 7–19)
AST SERPL-CCNC: 68 U/L (ref 5–40)
BASOPHILS ABSOLUTE: 0 K/UL (ref 0–0.2)
BASOPHILS RELATIVE PERCENT: 0.3 % (ref 0–1)
BILIRUB SERPL-MCNC: 0.4 MG/DL (ref 0.2–1.2)
BUN BLDV-MCNC: 43 MG/DL (ref 8–23)
CALCIUM SERPL-MCNC: 8 MG/DL (ref 8.8–10.2)
CHLORIDE BLD-SCNC: 102 MMOL/L (ref 98–111)
CO2: 20 MMOL/L (ref 22–29)
CREAT SERPL-MCNC: 1.2 MG/DL (ref 0.5–1.2)
EOSINOPHILS ABSOLUTE: 0 K/UL (ref 0–0.6)
EOSINOPHILS RELATIVE PERCENT: 0 % (ref 0–5)
GFR NON-AFRICAN AMERICAN: 59
GLUCOSE BLD-MCNC: 176 MG/DL (ref 74–109)
HCT VFR BLD CALC: 38.9 % (ref 42–52)
HEMOGLOBIN: 13.4 G/DL (ref 14–18)
LYMPHOCYTES ABSOLUTE: 0.6 K/UL (ref 1.1–4.5)
LYMPHOCYTES RELATIVE PERCENT: 3.6 % (ref 20–40)
MCH RBC QN AUTO: 31.2 PG (ref 27–31)
MCHC RBC AUTO-ENTMCNC: 34.4 G/DL (ref 33–37)
MCV RBC AUTO: 90.5 FL (ref 80–94)
MONOCYTES ABSOLUTE: 0.8 K/UL (ref 0–0.9)
MONOCYTES RELATIVE PERCENT: 5.2 % (ref 0–10)
NEUTROPHILS ABSOLUTE: 14.3 K/UL (ref 1.5–7.5)
NEUTROPHILS RELATIVE PERCENT: 89.8 % (ref 50–65)
PDW BLD-RTO: 12.8 % (ref 11.5–14.5)
PLATELET # BLD: 158 K/UL (ref 130–400)
PMV BLD AUTO: 12.7 FL (ref 9.4–12.4)
POTASSIUM SERPL-SCNC: 3.8 MMOL/L (ref 3.5–5)
RBC # BLD: 4.3 M/UL (ref 4.7–6.1)
SODIUM BLD-SCNC: 139 MMOL/L (ref 136–145)
TOTAL PROTEIN: 5.1 G/DL (ref 6.6–8.7)
WBC # BLD: 16 K/UL (ref 4.8–10.8)

## 2017-06-07 PROCEDURE — 2700000000 HC OXYGEN THERAPY PER DAY

## 2017-06-07 PROCEDURE — 2500000003 HC RX 250 WO HCPCS: Performed by: FAMILY MEDICINE

## 2017-06-07 PROCEDURE — 94762 N-INVAS EAR/PLS OXIMTRY CONT: CPT

## 2017-06-07 PROCEDURE — 99231 SBSQ HOSP IP/OBS SF/LOW 25: CPT | Performed by: SURGERY

## 2017-06-07 PROCEDURE — 36415 COLL VENOUS BLD VENIPUNCTURE: CPT

## 2017-06-07 PROCEDURE — 85025 COMPLETE CBC W/AUTO DIFF WBC: CPT

## 2017-06-07 PROCEDURE — 2580000003 HC RX 258: Performed by: INTERNAL MEDICINE

## 2017-06-07 PROCEDURE — 6370000000 HC RX 637 (ALT 250 FOR IP): Performed by: SURGERY

## 2017-06-07 PROCEDURE — 6360000002 HC RX W HCPCS: Performed by: SURGERY

## 2017-06-07 PROCEDURE — C9113 INJ PANTOPRAZOLE SODIUM, VIA: HCPCS | Performed by: SURGERY

## 2017-06-07 PROCEDURE — 6370000000 HC RX 637 (ALT 250 FOR IP): Performed by: INTERNAL MEDICINE

## 2017-06-07 PROCEDURE — 1210000000 HC MED SURG R&B

## 2017-06-07 PROCEDURE — 80053 COMPREHEN METABOLIC PANEL: CPT

## 2017-06-07 PROCEDURE — 2580000003 HC RX 258: Performed by: SURGERY

## 2017-06-07 RX ADMIN — METOPROLOL TARTRATE 2.5 MG: 5 INJECTION INTRAVENOUS at 21:19

## 2017-06-07 RX ADMIN — METRONIDAZOLE 500 MG: 500 TABLET ORAL at 05:34

## 2017-06-07 RX ADMIN — SODIUM CHLORIDE 1000 MG: 9 INJECTION, SOLUTION INTRAVENOUS at 18:25

## 2017-06-07 RX ADMIN — ENOXAPARIN SODIUM 40 MG: 40 INJECTION SUBCUTANEOUS at 20:55

## 2017-06-07 RX ADMIN — PANTOPRAZOLE SODIUM 40 MG: 40 INJECTION, POWDER, FOR SOLUTION INTRAVENOUS at 09:23

## 2017-06-07 RX ADMIN — METRONIDAZOLE 500 MG: 500 TABLET ORAL at 15:06

## 2017-06-07 RX ADMIN — MAGNESIUM HYDROXIDE 30 ML: 400 SUSPENSION ORAL at 20:55

## 2017-06-07 RX ADMIN — SODIUM BICARBONATE 325 MG: 650 TABLET ORAL at 20:55

## 2017-06-07 RX ADMIN — SODIUM CHLORIDE: 9 INJECTION, SOLUTION INTRAVENOUS at 02:02

## 2017-06-07 RX ADMIN — METRONIDAZOLE 500 MG: 500 TABLET ORAL at 21:18

## 2017-06-07 RX ADMIN — SODIUM BICARBONATE 325 MG: 650 TABLET ORAL at 09:23

## 2017-06-07 RX ADMIN — ONDANSETRON 4 MG: 2 INJECTION INTRAMUSCULAR; INTRAVENOUS at 10:51

## 2017-06-07 RX ADMIN — Medication 10 ML: at 09:24

## 2017-06-07 RX ADMIN — PANTOPRAZOLE SODIUM 40 MG: 40 INJECTION, POWDER, FOR SOLUTION INTRAVENOUS at 20:55

## 2017-06-08 LAB
ALBUMIN SERPL-MCNC: 2.7 G/DL (ref 3.5–5.2)
ALP BLD-CCNC: 57 U/L (ref 40–130)
ALT SERPL-CCNC: 21 U/L (ref 5–41)
ANION GAP SERPL CALCULATED.3IONS-SCNC: 15 MMOL/L (ref 7–19)
AST SERPL-CCNC: 38 U/L (ref 5–40)
BASOPHILS ABSOLUTE: 0 K/UL (ref 0–0.2)
BASOPHILS RELATIVE PERCENT: 0.3 % (ref 0–1)
BILIRUB SERPL-MCNC: 0.5 MG/DL (ref 0.2–1.2)
BUN BLDV-MCNC: 39 MG/DL (ref 8–23)
CALCIUM SERPL-MCNC: 8.1 MG/DL (ref 8.8–10.2)
CHLORIDE BLD-SCNC: 104 MMOL/L (ref 98–111)
CO2: 21 MMOL/L (ref 22–29)
CREAT SERPL-MCNC: 1.1 MG/DL (ref 0.5–1.2)
EOSINOPHILS ABSOLUTE: 0 K/UL (ref 0–0.6)
EOSINOPHILS RELATIVE PERCENT: 0.1 % (ref 0–5)
GFR NON-AFRICAN AMERICAN: >60
GLUCOSE BLD-MCNC: 163 MG/DL (ref 74–109)
HCT VFR BLD CALC: 37.7 % (ref 42–52)
HEMOGLOBIN: 13.2 G/DL (ref 14–18)
LYMPHOCYTES ABSOLUTE: 0.5 K/UL (ref 1.1–4.5)
LYMPHOCYTES RELATIVE PERCENT: 4.3 % (ref 20–40)
MCH RBC QN AUTO: 31.3 PG (ref 27–31)
MCHC RBC AUTO-ENTMCNC: 35 G/DL (ref 33–37)
MCV RBC AUTO: 89.3 FL (ref 80–94)
MONOCYTES ABSOLUTE: 1 K/UL (ref 0–0.9)
MONOCYTES RELATIVE PERCENT: 8.1 % (ref 0–10)
NEUTROPHILS ABSOLUTE: 10.3 K/UL (ref 1.5–7.5)
NEUTROPHILS RELATIVE PERCENT: 82.6 % (ref 50–65)
PDW BLD-RTO: 12.6 % (ref 11.5–14.5)
PLATELET # BLD: 164 K/UL (ref 130–400)
PLATELET SLIDE REVIEW: ADEQUATE
PMV BLD AUTO: 11.8 FL (ref 9.4–12.4)
POTASSIUM SERPL-SCNC: 3.8 MMOL/L (ref 3.5–5)
RBC # BLD: 4.22 M/UL (ref 4.7–6.1)
SODIUM BLD-SCNC: 140 MMOL/L (ref 136–145)
TOTAL PROTEIN: 4.8 G/DL (ref 6.6–8.7)
WBC # BLD: 12.5 K/UL (ref 4.8–10.8)

## 2017-06-08 PROCEDURE — 1210000000 HC MED SURG R&B

## 2017-06-08 PROCEDURE — 97162 PT EVAL MOD COMPLEX 30 MIN: CPT

## 2017-06-08 PROCEDURE — 36415 COLL VENOUS BLD VENIPUNCTURE: CPT

## 2017-06-08 PROCEDURE — 6370000000 HC RX 637 (ALT 250 FOR IP): Performed by: INTERNAL MEDICINE

## 2017-06-08 PROCEDURE — C9113 INJ PANTOPRAZOLE SODIUM, VIA: HCPCS | Performed by: SURGERY

## 2017-06-08 PROCEDURE — 2580000003 HC RX 258: Performed by: SURGERY

## 2017-06-08 PROCEDURE — 99232 SBSQ HOSP IP/OBS MODERATE 35: CPT | Performed by: SURGERY

## 2017-06-08 PROCEDURE — G8978 MOBILITY CURRENT STATUS: HCPCS

## 2017-06-08 PROCEDURE — 2700000000 HC OXYGEN THERAPY PER DAY

## 2017-06-08 PROCEDURE — 2500000003 HC RX 250 WO HCPCS: Performed by: FAMILY MEDICINE

## 2017-06-08 PROCEDURE — G8979 MOBILITY GOAL STATUS: HCPCS

## 2017-06-08 PROCEDURE — 80053 COMPREHEN METABOLIC PANEL: CPT

## 2017-06-08 PROCEDURE — 6370000000 HC RX 637 (ALT 250 FOR IP): Performed by: SURGERY

## 2017-06-08 PROCEDURE — 85025 COMPLETE CBC W/AUTO DIFF WBC: CPT

## 2017-06-08 PROCEDURE — 6360000002 HC RX W HCPCS: Performed by: SURGERY

## 2017-06-08 PROCEDURE — 94762 N-INVAS EAR/PLS OXIMTRY CONT: CPT

## 2017-06-08 RX ADMIN — ENOXAPARIN SODIUM 40 MG: 40 INJECTION SUBCUTANEOUS at 20:36

## 2017-06-08 RX ADMIN — MAGNESIUM HYDROXIDE 30 ML: 400 SUSPENSION ORAL at 09:18

## 2017-06-08 RX ADMIN — Medication 10 ML: at 20:36

## 2017-06-08 RX ADMIN — METRONIDAZOLE 500 MG: 500 TABLET ORAL at 06:06

## 2017-06-08 RX ADMIN — PANTOPRAZOLE SODIUM 40 MG: 40 INJECTION, POWDER, FOR SOLUTION INTRAVENOUS at 09:18

## 2017-06-08 RX ADMIN — METOPROLOL TARTRATE 2.5 MG: 5 INJECTION INTRAVENOUS at 13:56

## 2017-06-08 RX ADMIN — METRONIDAZOLE 500 MG: 500 TABLET ORAL at 13:56

## 2017-06-08 RX ADMIN — SODIUM CHLORIDE 1000 MG: 9 INJECTION, SOLUTION INTRAVENOUS at 18:11

## 2017-06-08 RX ADMIN — PANTOPRAZOLE SODIUM 40 MG: 40 INJECTION, POWDER, FOR SOLUTION INTRAVENOUS at 20:36

## 2017-06-08 RX ADMIN — SODIUM BICARBONATE 325 MG: 650 TABLET ORAL at 09:18

## 2017-06-08 RX ADMIN — METRONIDAZOLE 500 MG: 500 TABLET ORAL at 22:17

## 2017-06-08 RX ADMIN — SODIUM BICARBONATE 325 MG: 650 TABLET ORAL at 20:36

## 2017-06-08 ASSESSMENT — PAIN DESCRIPTION - PAIN TYPE: TYPE: ACUTE PAIN

## 2017-06-08 ASSESSMENT — PAIN DESCRIPTION - LOCATION: LOCATION: ABDOMEN

## 2017-06-08 ASSESSMENT — PAIN SCALES - WONG BAKER: WONGBAKER_NUMERICALRESPONSE: 2

## 2017-06-09 VITALS
DIASTOLIC BLOOD PRESSURE: 80 MMHG | TEMPERATURE: 98.3 F | HEIGHT: 74 IN | SYSTOLIC BLOOD PRESSURE: 143 MMHG | BODY MASS INDEX: 31.61 KG/M2 | WEIGHT: 246.3 LBS | HEART RATE: 87 BPM | RESPIRATION RATE: 18 BRPM | OXYGEN SATURATION: 92 %

## 2017-06-09 LAB
ALBUMIN SERPL-MCNC: 2.2 G/DL (ref 3.5–5.2)
ALP BLD-CCNC: 59 U/L (ref 40–130)
ALT SERPL-CCNC: 18 U/L (ref 5–41)
ANION GAP SERPL CALCULATED.3IONS-SCNC: 15 MMOL/L (ref 7–19)
ANISOCYTOSIS: ABNORMAL
AST SERPL-CCNC: 23 U/L (ref 5–40)
BANDED NEUTROPHILS RELATIVE PERCENT: 3 % (ref 0–5)
BASOPHILS ABSOLUTE: 0 K/UL (ref 0–0.2)
BASOPHILS MANUAL: 0 %
BASOPHILS RELATIVE PERCENT: 0 % (ref 0–1)
BILIRUB SERPL-MCNC: 0.4 MG/DL (ref 0.2–1.2)
BUN BLDV-MCNC: 37 MG/DL (ref 8–23)
CALCIUM SERPL-MCNC: 8.3 MG/DL (ref 8.8–10.2)
CHLORIDE BLD-SCNC: 103 MMOL/L (ref 98–111)
CO2: 21 MMOL/L (ref 22–29)
CREAT SERPL-MCNC: 1 MG/DL (ref 0.5–1.2)
EOSINOPHILS ABSOLUTE: 0.19 K/UL (ref 0–0.6)
EOSINOPHILS RELATIVE PERCENT: 2 % (ref 0–5)
GFR NON-AFRICAN AMERICAN: >60
GLUCOSE BLD-MCNC: 208 MG/DL (ref 74–109)
HCT VFR BLD CALC: 40.3 % (ref 42–52)
HEMOGLOBIN: 13.3 G/DL (ref 14–18)
LYMPHOCYTES ABSOLUTE: 1.2 K/UL (ref 1.1–4.5)
LYMPHOCYTES RELATIVE PERCENT: 13 % (ref 20–40)
MCH RBC QN AUTO: 30.6 PG (ref 27–31)
MCHC RBC AUTO-ENTMCNC: 33 G/DL (ref 33–37)
MCV RBC AUTO: 92.9 FL (ref 80–94)
MONOCYTES ABSOLUTE: 1.2 K/UL (ref 0–0.9)
MONOCYTES RELATIVE PERCENT: 13 % (ref 0–10)
NEUTROPHILS ABSOLUTE: 6.8 K/UL (ref 1.5–7.5)
NEUTROPHILS MANUAL: 69 %
NEUTROPHILS RELATIVE PERCENT: 69 % (ref 50–65)
PDW BLD-RTO: 12.7 % (ref 11.5–14.5)
PLATELET # BLD: 190 K/UL (ref 130–400)
PLATELET SLIDE REVIEW: ADEQUATE
PMV BLD AUTO: 11.2 FL (ref 9.4–12.4)
POTASSIUM SERPL-SCNC: 3.4 MMOL/L (ref 3.5–5)
RBC # BLD: 4.34 M/UL (ref 4.7–6.1)
SODIUM BLD-SCNC: 139 MMOL/L (ref 136–145)
TOTAL PROTEIN: 4.9 G/DL (ref 6.6–8.7)
WBC # BLD: 9.5 K/UL (ref 4.8–10.8)

## 2017-06-09 PROCEDURE — 6370000000 HC RX 637 (ALT 250 FOR IP): Performed by: INTERNAL MEDICINE

## 2017-06-09 PROCEDURE — 97116 GAIT TRAINING THERAPY: CPT

## 2017-06-09 PROCEDURE — 99238 HOSP IP/OBS DSCHRG MGMT 30/<: CPT | Performed by: PHYSICIAN ASSISTANT

## 2017-06-09 PROCEDURE — C9113 INJ PANTOPRAZOLE SODIUM, VIA: HCPCS | Performed by: SURGERY

## 2017-06-09 PROCEDURE — 80053 COMPREHEN METABOLIC PANEL: CPT

## 2017-06-09 PROCEDURE — 99232 SBSQ HOSP IP/OBS MODERATE 35: CPT | Performed by: SURGERY

## 2017-06-09 PROCEDURE — 85025 COMPLETE CBC W/AUTO DIFF WBC: CPT

## 2017-06-09 PROCEDURE — 2580000003 HC RX 258: Performed by: SURGERY

## 2017-06-09 PROCEDURE — 94762 N-INVAS EAR/PLS OXIMTRY CONT: CPT

## 2017-06-09 PROCEDURE — 6370000000 HC RX 637 (ALT 250 FOR IP): Performed by: SURGERY

## 2017-06-09 PROCEDURE — 36415 COLL VENOUS BLD VENIPUNCTURE: CPT

## 2017-06-09 PROCEDURE — 6360000002 HC RX W HCPCS: Performed by: SURGERY

## 2017-06-09 RX ORDER — METRONIDAZOLE 500 MG/1
500 TABLET ORAL 3 TIMES DAILY
Qty: 15 TABLET | Refills: 0 | Status: SHIPPED | OUTPATIENT
Start: 2017-06-09 | End: 2017-06-14

## 2017-06-09 RX ORDER — POTASSIUM CHLORIDE 20 MEQ/1
20 TABLET, EXTENDED RELEASE ORAL ONCE
Status: COMPLETED | OUTPATIENT
Start: 2017-06-09 | End: 2017-06-09

## 2017-06-09 RX ORDER — CIPROFLOXACIN 500 MG/1
500 TABLET, FILM COATED ORAL 2 TIMES DAILY
Qty: 20 TABLET | Refills: 0 | Status: SHIPPED | OUTPATIENT
Start: 2017-06-09 | End: 2017-06-19

## 2017-06-09 RX ORDER — SODIUM BICARBONATE 325 MG/1
325 TABLET ORAL 2 TIMES DAILY
Qty: 60 TABLET | Refills: 11 | Status: ON HOLD | OUTPATIENT
Start: 2017-06-09 | End: 2017-09-11 | Stop reason: ALTCHOICE

## 2017-06-09 RX ADMIN — METRONIDAZOLE 500 MG: 500 TABLET ORAL at 13:49

## 2017-06-09 RX ADMIN — METRONIDAZOLE 500 MG: 500 TABLET ORAL at 06:19

## 2017-06-09 RX ADMIN — SODIUM BICARBONATE 325 MG: 650 TABLET ORAL at 10:07

## 2017-06-09 RX ADMIN — POTASSIUM CHLORIDE 20 MEQ: 20 TABLET, EXTENDED RELEASE ORAL at 10:07

## 2017-06-09 RX ADMIN — PANTOPRAZOLE SODIUM 40 MG: 40 INJECTION, POWDER, FOR SOLUTION INTRAVENOUS at 10:07

## 2017-06-09 RX ADMIN — ONDANSETRON 4 MG: 2 INJECTION INTRAMUSCULAR; INTRAVENOUS at 13:50

## 2017-06-09 RX ADMIN — Medication 10 ML: at 10:09

## 2017-06-19 ENCOUNTER — TELEPHONE (OUTPATIENT)
Dept: SURGERY | Age: 77
End: 2017-06-19

## 2017-06-20 DIAGNOSIS — K57.20 PERFORATED DIVERTICULUM OF LARGE INTESTINE: Primary | ICD-10-CM

## 2017-06-20 RX ORDER — DOXYCYCLINE HYCLATE 100 MG/1
100 CAPSULE ORAL 2 TIMES DAILY
Qty: 28 CAPSULE | Refills: 0 | Status: SHIPPED | OUTPATIENT
Start: 2017-06-20 | End: 2017-07-04

## 2017-07-06 ENCOUNTER — OFFICE VISIT (OUTPATIENT)
Dept: SURGERY | Age: 77
End: 2017-07-06
Payer: MEDICARE

## 2017-07-06 VITALS
BODY MASS INDEX: 29.62 KG/M2 | HEIGHT: 71 IN | TEMPERATURE: 98.3 F | DIASTOLIC BLOOD PRESSURE: 70 MMHG | SYSTOLIC BLOOD PRESSURE: 142 MMHG | WEIGHT: 211.6 LBS

## 2017-07-06 DIAGNOSIS — K57.20 DIVERTICULITIS OF LARGE INTESTINE WITH PERFORATION WITHOUT BLEEDING: Primary | ICD-10-CM

## 2017-07-06 PROCEDURE — 4040F PNEUMOC VAC/ADMIN/RCVD: CPT | Performed by: PHYSICIAN ASSISTANT

## 2017-07-06 PROCEDURE — 1123F ACP DISCUSS/DSCN MKR DOCD: CPT | Performed by: PHYSICIAN ASSISTANT

## 2017-07-06 PROCEDURE — G8427 DOCREV CUR MEDS BY ELIG CLIN: HCPCS | Performed by: PHYSICIAN ASSISTANT

## 2017-07-06 PROCEDURE — G8419 CALC BMI OUT NRM PARAM NOF/U: HCPCS | Performed by: PHYSICIAN ASSISTANT

## 2017-07-06 PROCEDURE — 99213 OFFICE O/P EST LOW 20 MIN: CPT | Performed by: PHYSICIAN ASSISTANT

## 2017-07-06 PROCEDURE — 3017F COLORECTAL CA SCREEN DOC REV: CPT | Performed by: PHYSICIAN ASSISTANT

## 2017-07-06 PROCEDURE — 1036F TOBACCO NON-USER: CPT | Performed by: PHYSICIAN ASSISTANT

## 2017-07-06 PROCEDURE — 1111F DSCHRG MED/CURRENT MED MERGE: CPT | Performed by: PHYSICIAN ASSISTANT

## 2017-07-06 RX ORDER — FUROSEMIDE 40 MG/1
TABLET ORAL
Refills: 0 | Status: ON HOLD | COMMUNITY
Start: 2017-06-15 | End: 2017-08-01 | Stop reason: ALTCHOICE

## 2017-07-07 ENCOUNTER — TELEPHONE (OUTPATIENT)
Dept: SURGERY | Age: 77
End: 2017-07-07

## 2017-07-10 ENCOUNTER — TELEPHONE (OUTPATIENT)
Dept: SURGERY | Age: 77
End: 2017-07-10

## 2017-07-13 ENCOUNTER — OFFICE VISIT (OUTPATIENT)
Dept: GASTROENTEROLOGY | Age: 77
End: 2017-07-13
Payer: MEDICARE

## 2017-07-13 VITALS
HEART RATE: 77 BPM | DIASTOLIC BLOOD PRESSURE: 60 MMHG | OXYGEN SATURATION: 97 % | SYSTOLIC BLOOD PRESSURE: 114 MMHG | HEIGHT: 72 IN | BODY MASS INDEX: 28.58 KG/M2 | WEIGHT: 211 LBS

## 2017-07-13 DIAGNOSIS — Z87.19 HISTORY OF DIVERTICULITIS: Primary | ICD-10-CM

## 2017-07-13 PROCEDURE — 1123F ACP DISCUSS/DSCN MKR DOCD: CPT | Performed by: NURSE PRACTITIONER

## 2017-07-13 PROCEDURE — 3017F COLORECTAL CA SCREEN DOC REV: CPT | Performed by: NURSE PRACTITIONER

## 2017-07-13 PROCEDURE — G8427 DOCREV CUR MEDS BY ELIG CLIN: HCPCS | Performed by: NURSE PRACTITIONER

## 2017-07-13 PROCEDURE — G8419 CALC BMI OUT NRM PARAM NOF/U: HCPCS | Performed by: NURSE PRACTITIONER

## 2017-07-13 PROCEDURE — 4040F PNEUMOC VAC/ADMIN/RCVD: CPT | Performed by: NURSE PRACTITIONER

## 2017-07-13 PROCEDURE — 99214 OFFICE O/P EST MOD 30 MIN: CPT | Performed by: NURSE PRACTITIONER

## 2017-07-13 PROCEDURE — 1036F TOBACCO NON-USER: CPT | Performed by: NURSE PRACTITIONER

## 2017-07-13 RX ORDER — LOSARTAN POTASSIUM AND HYDROCHLOROTHIAZIDE 25; 100 MG/1; MG/1
TABLET ORAL
Refills: 3 | COMMUNITY
Start: 2017-07-07 | End: 2018-12-11

## 2017-07-13 ASSESSMENT — ENCOUNTER SYMPTOMS
COUGH: 0
VOICE CHANGE: 0
CONSTIPATION: 0
ABDOMINAL PAIN: 1
RECTAL PAIN: 0
CHEST TIGHTNESS: 0
SORE THROAT: 0
BLOOD IN STOOL: 0
NAUSEA: 1
BACK PAIN: 1
SHORTNESS OF BREATH: 0
ABDOMINAL DISTENTION: 0
DIARRHEA: 0
VOMITING: 0

## 2017-07-17 ENCOUNTER — TELEPHONE (OUTPATIENT)
Dept: SURGERY | Age: 77
End: 2017-07-17

## 2017-07-17 DIAGNOSIS — R10.84 GENERALIZED ABDOMINAL PAIN: Primary | ICD-10-CM

## 2017-07-17 DIAGNOSIS — R11.0 NAUSEA ALONE: ICD-10-CM

## 2017-07-17 RX ORDER — ONDANSETRON 4 MG/1
4 TABLET, FILM COATED ORAL EVERY 8 HOURS PRN
Qty: 20 TABLET | Refills: 1 | Status: ON HOLD | OUTPATIENT
Start: 2017-07-17 | End: 2017-09-11 | Stop reason: ALTCHOICE

## 2017-07-17 RX ORDER — DICYCLOMINE HYDROCHLORIDE 10 MG/1
10 CAPSULE ORAL
Qty: 60 CAPSULE | Refills: 1 | Status: ON HOLD | OUTPATIENT
Start: 2017-07-17 | End: 2017-08-01

## 2017-07-27 ENCOUNTER — OFFICE VISIT (OUTPATIENT)
Dept: SURGERY | Age: 77
End: 2017-07-27
Payer: MEDICARE

## 2017-07-27 VITALS
WEIGHT: 211.4 LBS | SYSTOLIC BLOOD PRESSURE: 126 MMHG | TEMPERATURE: 97.7 F | DIASTOLIC BLOOD PRESSURE: 70 MMHG | BODY MASS INDEX: 29.6 KG/M2 | HEIGHT: 71 IN

## 2017-07-27 DIAGNOSIS — K65.1 INTRA-ABDOMINAL ABSCESS (HCC): ICD-10-CM

## 2017-07-27 DIAGNOSIS — R10.31 RIGHT LOWER QUADRANT ABDOMINAL PAIN: Primary | ICD-10-CM

## 2017-07-27 PROCEDURE — 99213 OFFICE O/P EST LOW 20 MIN: CPT | Performed by: PHYSICIAN ASSISTANT

## 2017-07-27 PROCEDURE — 4040F PNEUMOC VAC/ADMIN/RCVD: CPT | Performed by: PHYSICIAN ASSISTANT

## 2017-07-27 PROCEDURE — 1123F ACP DISCUSS/DSCN MKR DOCD: CPT | Performed by: PHYSICIAN ASSISTANT

## 2017-07-27 PROCEDURE — 1036F TOBACCO NON-USER: CPT | Performed by: PHYSICIAN ASSISTANT

## 2017-07-27 PROCEDURE — G8419 CALC BMI OUT NRM PARAM NOF/U: HCPCS | Performed by: PHYSICIAN ASSISTANT

## 2017-07-27 PROCEDURE — G8427 DOCREV CUR MEDS BY ELIG CLIN: HCPCS | Performed by: PHYSICIAN ASSISTANT

## 2017-08-01 ENCOUNTER — DIRECT ADMIT ORDERS (OUTPATIENT)
Dept: SURGERY | Age: 77
End: 2017-08-01

## 2017-08-01 ENCOUNTER — HOSPITAL ENCOUNTER (INPATIENT)
Dept: CT IMAGING | Age: 77
LOS: 5 days | Discharge: HOME | DRG: 392 | End: 2017-08-07
Attending: SURGERY | Admitting: SURGERY
Payer: MEDICARE

## 2017-08-01 ENCOUNTER — TELEPHONE (OUTPATIENT)
Dept: SURGERY | Age: 77
End: 2017-08-01

## 2017-08-01 DIAGNOSIS — K57.20 COLONIC DIVERTICULAR ABSCESS: ICD-10-CM

## 2017-08-01 DIAGNOSIS — R10.31 RIGHT LOWER QUADRANT ABDOMINAL PAIN: ICD-10-CM

## 2017-08-01 LAB
BILIRUBIN URINE: NEGATIVE
BLOOD, URINE: NEGATIVE
CLARITY: CLEAR
COLOR: YELLOW
GFR NON-AFRICAN AMERICAN: 54
GLUCOSE URINE: NEGATIVE MG/DL
KETONES, URINE: NEGATIVE MG/DL
LEUKOCYTE ESTERASE, URINE: NEGATIVE
NITRITE, URINE: NEGATIVE
PERFORMED ON: ABNORMAL
PH UA: 6.5
POC CREATININE: 1.3 MG/DL (ref 0.3–1.3)
POC SAMPLE TYPE: ABNORMAL
PROTEIN UA: NEGATIVE MG/DL
SPECIFIC GRAVITY UA: 1.04
UROBILINOGEN, URINE: 0.2 E.U./DL

## 2017-08-01 PROCEDURE — 2580000003 HC RX 258: Performed by: PHYSICIAN ASSISTANT

## 2017-08-01 PROCEDURE — 82565 ASSAY OF CREATININE: CPT

## 2017-08-01 PROCEDURE — 81003 URINALYSIS AUTO W/O SCOPE: CPT

## 2017-08-01 PROCEDURE — 74177 CT ABD & PELVIS W/CONTRAST: CPT

## 2017-08-01 PROCEDURE — 6360000002 HC RX W HCPCS: Performed by: PHYSICIAN ASSISTANT

## 2017-08-01 PROCEDURE — 6360000004 HC RX CONTRAST MEDICATION: Performed by: PHYSICIAN ASSISTANT

## 2017-08-01 RX ORDER — ACETAMINOPHEN 325 MG/1
650 TABLET ORAL EVERY 4 HOURS PRN
Status: DISCONTINUED | OUTPATIENT
Start: 2017-08-01 | End: 2017-08-07 | Stop reason: HOSPADM

## 2017-08-01 RX ORDER — SODIUM CHLORIDE 0.9 % (FLUSH) 0.9 %
10 SYRINGE (ML) INJECTION EVERY 12 HOURS SCHEDULED
Status: DISCONTINUED | OUTPATIENT
Start: 2017-08-01 | End: 2017-08-07 | Stop reason: HOSPADM

## 2017-08-01 RX ORDER — ONDANSETRON 2 MG/ML
4 INJECTION INTRAMUSCULAR; INTRAVENOUS EVERY 6 HOURS PRN
Status: CANCELLED | OUTPATIENT
Start: 2017-08-01

## 2017-08-01 RX ORDER — OXYCODONE HYDROCHLORIDE AND ACETAMINOPHEN 5; 325 MG/1; MG/1
1 TABLET ORAL EVERY 4 HOURS PRN
Status: DISCONTINUED | OUTPATIENT
Start: 2017-08-01 | End: 2017-08-07 | Stop reason: HOSPADM

## 2017-08-01 RX ORDER — OXYCODONE HYDROCHLORIDE AND ACETAMINOPHEN 5; 325 MG/1; MG/1
1 TABLET ORAL EVERY 4 HOURS PRN
Status: CANCELLED | OUTPATIENT
Start: 2017-08-01

## 2017-08-01 RX ORDER — SODIUM CHLORIDE 450 MG/100ML
INJECTION, SOLUTION INTRAVENOUS CONTINUOUS
Status: DISCONTINUED | OUTPATIENT
Start: 2017-08-01 | End: 2017-08-07 | Stop reason: HOSPADM

## 2017-08-01 RX ORDER — MORPHINE SULFATE 4 MG/ML
2 INJECTION, SOLUTION INTRAMUSCULAR; INTRAVENOUS
Status: CANCELLED | OUTPATIENT
Start: 2017-08-01

## 2017-08-01 RX ORDER — SODIUM CHLORIDE 0.9 % (FLUSH) 0.9 %
10 SYRINGE (ML) INJECTION PRN
Status: DISCONTINUED | OUTPATIENT
Start: 2017-08-01 | End: 2017-08-07 | Stop reason: HOSPADM

## 2017-08-01 RX ORDER — IBUPROFEN 200 MG
400 TABLET ORAL 2 TIMES DAILY
COMMUNITY
End: 2018-06-18 | Stop reason: CLARIF

## 2017-08-01 RX ORDER — MORPHINE SULFATE 4 MG/ML
4 INJECTION, SOLUTION INTRAMUSCULAR; INTRAVENOUS
Status: DISCONTINUED | OUTPATIENT
Start: 2017-08-01 | End: 2017-08-07 | Stop reason: HOSPADM

## 2017-08-01 RX ORDER — MORPHINE SULFATE 4 MG/ML
4 INJECTION, SOLUTION INTRAMUSCULAR; INTRAVENOUS
Status: CANCELLED | OUTPATIENT
Start: 2017-08-01

## 2017-08-01 RX ORDER — SODIUM CHLORIDE 0.9 % (FLUSH) 0.9 %
10 SYRINGE (ML) INJECTION EVERY 12 HOURS SCHEDULED
Status: CANCELLED | OUTPATIENT
Start: 2017-08-01

## 2017-08-01 RX ORDER — ONDANSETRON 2 MG/ML
4 INJECTION INTRAMUSCULAR; INTRAVENOUS EVERY 6 HOURS PRN
Status: DISCONTINUED | OUTPATIENT
Start: 2017-08-01 | End: 2017-08-07 | Stop reason: HOSPADM

## 2017-08-01 RX ORDER — MORPHINE SULFATE 4 MG/ML
2 INJECTION, SOLUTION INTRAMUSCULAR; INTRAVENOUS
Status: DISCONTINUED | OUTPATIENT
Start: 2017-08-01 | End: 2017-08-07 | Stop reason: HOSPADM

## 2017-08-01 RX ORDER — ACETAMINOPHEN 325 MG/1
650 TABLET ORAL EVERY 4 HOURS PRN
Status: CANCELLED | OUTPATIENT
Start: 2017-08-01

## 2017-08-01 RX ORDER — SODIUM CHLORIDE 0.9 % (FLUSH) 0.9 %
10 SYRINGE (ML) INJECTION PRN
Status: CANCELLED | OUTPATIENT
Start: 2017-08-01

## 2017-08-01 RX ORDER — SODIUM CHLORIDE 450 MG/100ML
INJECTION, SOLUTION INTRAVENOUS CONTINUOUS
Status: CANCELLED | OUTPATIENT
Start: 2017-08-01

## 2017-08-01 RX ADMIN — IOVERSOL 75 ML: 741 INJECTION INTRA-ARTERIAL; INTRAVENOUS at 11:57

## 2017-08-01 RX ADMIN — SODIUM CHLORIDE: 4.5 INJECTION, SOLUTION INTRAVENOUS at 15:47

## 2017-08-01 RX ADMIN — SODIUM CHLORIDE: 4.5 INJECTION, SOLUTION INTRAVENOUS at 15:44

## 2017-08-01 RX ADMIN — SODIUM CHLORIDE 1000 MG: 9 INJECTION, SOLUTION INTRAVENOUS at 17:17

## 2017-08-01 ASSESSMENT — ENCOUNTER SYMPTOMS
SHORTNESS OF BREATH: 1
SPUTUM PRODUCTION: 0
HEARTBURN: 0
COUGH: 0
VOMITING: 0
NAUSEA: 1
DIARRHEA: 0
BACK PAIN: 1
EYES NEGATIVE: 1
ABDOMINAL PAIN: 1
CONSTIPATION: 1

## 2017-08-02 ENCOUNTER — APPOINTMENT (OUTPATIENT)
Dept: CT IMAGING | Age: 77
DRG: 392 | End: 2017-08-02
Attending: SURGERY
Payer: MEDICARE

## 2017-08-02 LAB
ALBUMIN SERPL-MCNC: 2.6 G/DL (ref 3.5–5.2)
ALP BLD-CCNC: 79 U/L (ref 40–130)
ALT SERPL-CCNC: 16 U/L (ref 5–41)
ANION GAP SERPL CALCULATED.3IONS-SCNC: 14 MMOL/L (ref 7–19)
APTT: 29.7 SEC (ref 26–36.2)
AST SERPL-CCNC: 15 U/L (ref 5–40)
BASOPHILS ABSOLUTE: 0.1 K/UL (ref 0–0.2)
BASOPHILS RELATIVE PERCENT: 1.1 % (ref 0–1)
BILIRUB SERPL-MCNC: 0.3 MG/DL (ref 0.2–1.2)
BUN BLDV-MCNC: 17 MG/DL (ref 8–23)
CALCIUM SERPL-MCNC: 8.2 MG/DL (ref 8.8–10.2)
CHLORIDE BLD-SCNC: 97 MMOL/L (ref 98–111)
CO2: 24 MMOL/L (ref 22–29)
CREAT SERPL-MCNC: 1.1 MG/DL (ref 0.5–1.2)
EOSINOPHILS ABSOLUTE: 0 K/UL (ref 0–0.6)
EOSINOPHILS RELATIVE PERCENT: 0 % (ref 0–5)
GFR NON-AFRICAN AMERICAN: >60
GLUCOSE BLD-MCNC: 125 MG/DL (ref 74–109)
HCT VFR BLD CALC: 32.2 % (ref 42–52)
HEMOGLOBIN: 10.5 G/DL (ref 14–18)
INR BLD: 1.17 (ref 0.88–1.18)
LYMPHOCYTES ABSOLUTE: 1.3 K/UL (ref 1.1–4.5)
LYMPHOCYTES RELATIVE PERCENT: 28.1 % (ref 20–40)
MCH RBC QN AUTO: 29.7 PG (ref 27–31)
MCHC RBC AUTO-ENTMCNC: 32.6 G/DL (ref 33–37)
MCV RBC AUTO: 91 FL (ref 80–94)
MONOCYTES ABSOLUTE: 0.6 K/UL (ref 0–0.9)
MONOCYTES RELATIVE PERCENT: 11.8 % (ref 0–10)
NEUTROPHILS ABSOLUTE: 2.7 K/UL (ref 1.5–7.5)
NEUTROPHILS RELATIVE PERCENT: 56 % (ref 50–65)
PDW BLD-RTO: 13.3 % (ref 11.5–14.5)
PLATELET # BLD: 251 K/UL (ref 130–400)
PMV BLD AUTO: 9.8 FL (ref 9.4–12.4)
POTASSIUM SERPL-SCNC: 3.5 MMOL/L (ref 3.5–5)
PROTHROMBIN TIME: 14.9 SEC (ref 12–14.6)
RBC # BLD: 3.54 M/UL (ref 4.7–6.1)
SODIUM BLD-SCNC: 135 MMOL/L (ref 136–145)
TOTAL PROTEIN: 5.7 G/DL (ref 6.6–8.7)
WBC # BLD: 4.7 K/UL (ref 4.8–10.8)

## 2017-08-02 PROCEDURE — 75989 ABSCESS DRAINAGE UNDER X-RAY: CPT

## 2017-08-02 PROCEDURE — 87205 SMEAR GRAM STAIN: CPT

## 2017-08-02 PROCEDURE — 85025 COMPLETE CBC W/AUTO DIFF WBC: CPT

## 2017-08-02 PROCEDURE — 1210000000 HC MED SURG R&B

## 2017-08-02 PROCEDURE — 85730 THROMBOPLASTIN TIME PARTIAL: CPT

## 2017-08-02 PROCEDURE — 99231 SBSQ HOSP IP/OBS SF/LOW 25: CPT | Performed by: SURGERY

## 2017-08-02 PROCEDURE — 6370000000 HC RX 637 (ALT 250 FOR IP): Performed by: SURGERY

## 2017-08-02 PROCEDURE — 85610 PROTHROMBIN TIME: CPT

## 2017-08-02 PROCEDURE — 36415 COLL VENOUS BLD VENIPUNCTURE: CPT

## 2017-08-02 PROCEDURE — 87185 SC STD ENZYME DETCJ PER NZM: CPT

## 2017-08-02 PROCEDURE — 6360000002 HC RX W HCPCS: Performed by: PHYSICIAN ASSISTANT

## 2017-08-02 PROCEDURE — 87070 CULTURE OTHR SPECIMN AEROBIC: CPT

## 2017-08-02 PROCEDURE — 2580000003 HC RX 258: Performed by: PHYSICIAN ASSISTANT

## 2017-08-02 PROCEDURE — 87075 CULTR BACTERIA EXCEPT BLOOD: CPT

## 2017-08-02 PROCEDURE — 80053 COMPREHEN METABOLIC PANEL: CPT

## 2017-08-02 PROCEDURE — 0D9F30Z DRAINAGE OF RIGHT LARGE INTESTINE WITH DRAINAGE DEVICE, PERCUTANEOUS APPROACH: ICD-10-PCS | Performed by: RADIOLOGY

## 2017-08-02 PROCEDURE — 87077 CULTURE AEROBIC IDENTIFY: CPT

## 2017-08-02 RX ORDER — PANTOPRAZOLE SODIUM 40 MG/1
40 TABLET, DELAYED RELEASE ORAL DAILY
Status: DISCONTINUED | OUTPATIENT
Start: 2017-08-02 | End: 2017-08-07 | Stop reason: HOSPADM

## 2017-08-02 RX ORDER — NIFEDIPINE 60 MG/1
60 TABLET, FILM COATED, EXTENDED RELEASE ORAL DAILY
Status: DISCONTINUED | OUTPATIENT
Start: 2017-08-02 | End: 2017-08-03

## 2017-08-02 RX ORDER — LOSARTAN POTASSIUM AND HYDROCHLOROTHIAZIDE 25; 100 MG/1; MG/1
1 TABLET ORAL DAILY
Status: DISCONTINUED | OUTPATIENT
Start: 2017-08-02 | End: 2017-08-02 | Stop reason: CLARIF

## 2017-08-02 RX ORDER — HYDROCHLOROTHIAZIDE 25 MG/1
25 TABLET ORAL DAILY
Status: DISCONTINUED | OUTPATIENT
Start: 2017-08-02 | End: 2017-08-07 | Stop reason: HOSPADM

## 2017-08-02 RX ORDER — METOPROLOL SUCCINATE 50 MG/1
100 TABLET, EXTENDED RELEASE ORAL DAILY
Status: DISCONTINUED | OUTPATIENT
Start: 2017-08-02 | End: 2017-08-07 | Stop reason: HOSPADM

## 2017-08-02 RX ORDER — SODIUM BICARBONATE 325 MG/1
325 TABLET ORAL 2 TIMES DAILY
Status: DISCONTINUED | OUTPATIENT
Start: 2017-08-02 | End: 2017-08-07 | Stop reason: HOSPADM

## 2017-08-02 RX ORDER — LOSARTAN POTASSIUM 100 MG/1
100 TABLET ORAL DAILY
Status: DISCONTINUED | OUTPATIENT
Start: 2017-08-02 | End: 2017-08-07 | Stop reason: HOSPADM

## 2017-08-02 RX ADMIN — PANTOPRAZOLE SODIUM 40 MG: 40 TABLET, DELAYED RELEASE ORAL at 14:22

## 2017-08-02 RX ADMIN — HYDROCHLOROTHIAZIDE 25 MG: 25 TABLET ORAL at 14:22

## 2017-08-02 RX ADMIN — SODIUM BICARBONATE 325 MG: 325 TABLET ORAL at 14:22

## 2017-08-02 RX ADMIN — SODIUM CHLORIDE: 4.5 INJECTION, SOLUTION INTRAVENOUS at 20:53

## 2017-08-02 RX ADMIN — SODIUM CHLORIDE: 4.5 INJECTION, SOLUTION INTRAVENOUS at 04:50

## 2017-08-02 RX ADMIN — SODIUM CHLORIDE 1000 MG: 9 INJECTION, SOLUTION INTRAVENOUS at 17:53

## 2017-08-02 RX ADMIN — SODIUM BICARBONATE 325 MG: 325 TABLET ORAL at 19:57

## 2017-08-02 RX ADMIN — METOPROLOL SUCCINATE 100 MG: 50 TABLET, EXTENDED RELEASE ORAL at 14:22

## 2017-08-03 PROCEDURE — 99231 SBSQ HOSP IP/OBS SF/LOW 25: CPT | Performed by: SURGERY

## 2017-08-03 PROCEDURE — 1210000000 HC MED SURG R&B

## 2017-08-03 PROCEDURE — 2580000003 HC RX 258: Performed by: PHYSICIAN ASSISTANT

## 2017-08-03 PROCEDURE — 6370000000 HC RX 637 (ALT 250 FOR IP): Performed by: SURGERY

## 2017-08-03 PROCEDURE — 6360000002 HC RX W HCPCS: Performed by: PHYSICIAN ASSISTANT

## 2017-08-03 RX ORDER — NIFEDIPINE 60 MG/1
60 TABLET, EXTENDED RELEASE ORAL DAILY
Status: DISCONTINUED | OUTPATIENT
Start: 2017-08-03 | End: 2017-08-07 | Stop reason: HOSPADM

## 2017-08-03 RX ADMIN — Medication 10 ML: at 09:43

## 2017-08-03 RX ADMIN — SODIUM BICARBONATE 325 MG: 325 TABLET ORAL at 21:07

## 2017-08-03 RX ADMIN — METOPROLOL SUCCINATE 100 MG: 50 TABLET, EXTENDED RELEASE ORAL at 08:06

## 2017-08-03 RX ADMIN — HYDROCHLOROTHIAZIDE 25 MG: 25 TABLET ORAL at 08:06

## 2017-08-03 RX ADMIN — SODIUM CHLORIDE 1000 MG: 9 INJECTION, SOLUTION INTRAVENOUS at 17:00

## 2017-08-03 RX ADMIN — SODIUM BICARBONATE 325 MG: 325 TABLET ORAL at 08:06

## 2017-08-03 RX ADMIN — PANTOPRAZOLE SODIUM 40 MG: 40 TABLET, DELAYED RELEASE ORAL at 08:06

## 2017-08-03 RX ADMIN — LOSARTAN POTASSIUM 100 MG: 100 TABLET, FILM COATED ORAL at 08:06

## 2017-08-03 RX ADMIN — NIFEDIPINE 60 MG: 60 TABLET, FILM COATED, EXTENDED RELEASE ORAL at 09:41

## 2017-08-04 PROCEDURE — 2580000003 HC RX 258: Performed by: PHYSICIAN ASSISTANT

## 2017-08-04 PROCEDURE — 1210000000 HC MED SURG R&B

## 2017-08-04 PROCEDURE — 6370000000 HC RX 637 (ALT 250 FOR IP): Performed by: SURGERY

## 2017-08-04 PROCEDURE — 6360000002 HC RX W HCPCS: Performed by: PHYSICIAN ASSISTANT

## 2017-08-04 RX ADMIN — PANTOPRAZOLE SODIUM 40 MG: 40 TABLET, DELAYED RELEASE ORAL at 11:27

## 2017-08-04 RX ADMIN — METOPROLOL SUCCINATE 100 MG: 50 TABLET, EXTENDED RELEASE ORAL at 09:59

## 2017-08-04 RX ADMIN — SODIUM BICARBONATE 325 MG: 325 TABLET ORAL at 09:59

## 2017-08-04 RX ADMIN — Medication 10 ML: at 20:40

## 2017-08-04 RX ADMIN — NIFEDIPINE 60 MG: 60 TABLET, FILM COATED, EXTENDED RELEASE ORAL at 09:58

## 2017-08-04 RX ADMIN — HYDROCHLOROTHIAZIDE 25 MG: 25 TABLET ORAL at 09:58

## 2017-08-04 RX ADMIN — SODIUM CHLORIDE: 4.5 INJECTION, SOLUTION INTRAVENOUS at 00:47

## 2017-08-04 RX ADMIN — SODIUM BICARBONATE 325 MG: 325 TABLET ORAL at 20:40

## 2017-08-04 RX ADMIN — LOSARTAN POTASSIUM 100 MG: 100 TABLET, FILM COATED ORAL at 10:13

## 2017-08-04 RX ADMIN — SODIUM CHLORIDE 1000 MG: 9 INJECTION, SOLUTION INTRAVENOUS at 16:46

## 2017-08-04 RX ADMIN — Medication 10 ML: at 10:52

## 2017-08-04 ASSESSMENT — PAIN SCALES - GENERAL: PAINLEVEL_OUTOF10: 0

## 2017-08-05 PROCEDURE — 6370000000 HC RX 637 (ALT 250 FOR IP): Performed by: SURGERY

## 2017-08-05 PROCEDURE — 1210000000 HC MED SURG R&B

## 2017-08-05 PROCEDURE — 99231 SBSQ HOSP IP/OBS SF/LOW 25: CPT | Performed by: SURGERY

## 2017-08-05 PROCEDURE — 6360000002 HC RX W HCPCS: Performed by: SURGERY

## 2017-08-05 PROCEDURE — 2580000003 HC RX 258: Performed by: PHYSICIAN ASSISTANT

## 2017-08-05 PROCEDURE — 6360000002 HC RX W HCPCS: Performed by: PHYSICIAN ASSISTANT

## 2017-08-05 RX ADMIN — LOSARTAN POTASSIUM 100 MG: 100 TABLET, FILM COATED ORAL at 09:04

## 2017-08-05 RX ADMIN — ENOXAPARIN SODIUM 40 MG: 40 INJECTION SUBCUTANEOUS at 13:28

## 2017-08-05 RX ADMIN — METOPROLOL SUCCINATE 100 MG: 50 TABLET, EXTENDED RELEASE ORAL at 09:04

## 2017-08-05 RX ADMIN — SODIUM CHLORIDE: 4.5 INJECTION, SOLUTION INTRAVENOUS at 01:53

## 2017-08-05 RX ADMIN — SODIUM BICARBONATE 325 MG: 325 TABLET ORAL at 20:11

## 2017-08-05 RX ADMIN — SODIUM CHLORIDE 1000 MG: 9 INJECTION, SOLUTION INTRAVENOUS at 17:35

## 2017-08-05 RX ADMIN — SODIUM BICARBONATE 325 MG: 325 TABLET ORAL at 09:04

## 2017-08-05 RX ADMIN — Medication 10 ML: at 20:11

## 2017-08-05 RX ADMIN — NIFEDIPINE 60 MG: 60 TABLET, FILM COATED, EXTENDED RELEASE ORAL at 09:04

## 2017-08-05 RX ADMIN — HYDROCHLOROTHIAZIDE 25 MG: 25 TABLET ORAL at 09:04

## 2017-08-05 RX ADMIN — PANTOPRAZOLE SODIUM 40 MG: 40 TABLET, DELAYED RELEASE ORAL at 09:04

## 2017-08-05 RX ADMIN — Medication 10 ML: at 09:05

## 2017-08-05 ASSESSMENT — PAIN SCALES - GENERAL: PAINLEVEL_OUTOF10: 0

## 2017-08-06 LAB
ANAEROBIC CULTURE: ABNORMAL
ANION GAP SERPL CALCULATED.3IONS-SCNC: 15 MMOL/L (ref 7–19)
BASOPHILS ABSOLUTE: 0.1 K/UL (ref 0–0.2)
BASOPHILS RELATIVE PERCENT: 1.4 % (ref 0–1)
BUN BLDV-MCNC: 10 MG/DL (ref 8–23)
CALCIUM SERPL-MCNC: 9 MG/DL (ref 8.8–10.2)
CHLORIDE BLD-SCNC: 94 MMOL/L (ref 98–111)
CO2: 25 MMOL/L (ref 22–29)
CREAT SERPL-MCNC: 1 MG/DL (ref 0.5–1.2)
EOSINOPHILS ABSOLUTE: 0 K/UL (ref 0–0.6)
EOSINOPHILS RELATIVE PERCENT: 0 % (ref 0–5)
GFR NON-AFRICAN AMERICAN: >60
GLUCOSE BLD-MCNC: 142 MG/DL (ref 74–109)
GRAM STAIN RESULT: ABNORMAL
HCT VFR BLD CALC: 38.6 % (ref 42–52)
HEMOGLOBIN: 12.6 G/DL (ref 14–18)
LYMPHOCYTES ABSOLUTE: 1.9 K/UL (ref 1.1–4.5)
LYMPHOCYTES RELATIVE PERCENT: 26.6 % (ref 20–40)
MCH RBC QN AUTO: 29.4 PG (ref 27–31)
MCHC RBC AUTO-ENTMCNC: 32.6 G/DL (ref 33–37)
MCV RBC AUTO: 90.2 FL (ref 80–94)
MONOCYTES ABSOLUTE: 0.6 K/UL (ref 0–0.9)
MONOCYTES RELATIVE PERCENT: 8.5 % (ref 0–10)
NEUTROPHILS ABSOLUTE: 4 K/UL (ref 1.5–7.5)
NEUTROPHILS RELATIVE PERCENT: 58 % (ref 50–65)
ORGANISM: ABNORMAL
ORGANISM: ABNORMAL
PDW BLD-RTO: 13.5 % (ref 11.5–14.5)
PLATELET # BLD: 305 K/UL (ref 130–400)
PMV BLD AUTO: 9.5 FL (ref 9.4–12.4)
POTASSIUM SERPL-SCNC: 3.8 MMOL/L (ref 3.5–5)
RBC # BLD: 4.28 M/UL (ref 4.7–6.1)
SODIUM BLD-SCNC: 134 MMOL/L (ref 136–145)
WBC # BLD: 7 K/UL (ref 4.8–10.8)
WOUND/ABSCESS: ABNORMAL
WOUND/ABSCESS: ABNORMAL

## 2017-08-06 PROCEDURE — 6370000000 HC RX 637 (ALT 250 FOR IP): Performed by: SURGERY

## 2017-08-06 PROCEDURE — 1210000000 HC MED SURG R&B

## 2017-08-06 PROCEDURE — 6360000002 HC RX W HCPCS: Performed by: SURGERY

## 2017-08-06 PROCEDURE — 85025 COMPLETE CBC W/AUTO DIFF WBC: CPT

## 2017-08-06 PROCEDURE — 99231 SBSQ HOSP IP/OBS SF/LOW 25: CPT | Performed by: SURGERY

## 2017-08-06 PROCEDURE — 2580000003 HC RX 258: Performed by: PHYSICIAN ASSISTANT

## 2017-08-06 PROCEDURE — 6360000002 HC RX W HCPCS: Performed by: PHYSICIAN ASSISTANT

## 2017-08-06 PROCEDURE — 80048 BASIC METABOLIC PNL TOTAL CA: CPT

## 2017-08-06 PROCEDURE — 36415 COLL VENOUS BLD VENIPUNCTURE: CPT

## 2017-08-06 RX ORDER — DOCUSATE SODIUM 100 MG/1
100 CAPSULE, LIQUID FILLED ORAL DAILY
Status: DISCONTINUED | OUTPATIENT
Start: 2017-08-06 | End: 2017-08-07 | Stop reason: HOSPADM

## 2017-08-06 RX ADMIN — SODIUM CHLORIDE: 4.5 INJECTION, SOLUTION INTRAVENOUS at 06:38

## 2017-08-06 RX ADMIN — Medication 10 ML: at 09:05

## 2017-08-06 RX ADMIN — SODIUM CHLORIDE 1000 MG: 9 INJECTION, SOLUTION INTRAVENOUS at 16:42

## 2017-08-06 RX ADMIN — METOPROLOL SUCCINATE 100 MG: 50 TABLET, EXTENDED RELEASE ORAL at 09:04

## 2017-08-06 RX ADMIN — NIFEDIPINE 60 MG: 60 TABLET, FILM COATED, EXTENDED RELEASE ORAL at 09:05

## 2017-08-06 RX ADMIN — HYDROCHLOROTHIAZIDE 25 MG: 25 TABLET ORAL at 09:04

## 2017-08-06 RX ADMIN — Medication 10 ML: at 20:20

## 2017-08-06 RX ADMIN — SODIUM BICARBONATE 325 MG: 325 TABLET ORAL at 09:04

## 2017-08-06 RX ADMIN — PANTOPRAZOLE SODIUM 40 MG: 40 TABLET, DELAYED RELEASE ORAL at 09:04

## 2017-08-06 RX ADMIN — LOSARTAN POTASSIUM 100 MG: 100 TABLET, FILM COATED ORAL at 09:04

## 2017-08-06 RX ADMIN — DOCUSATE SODIUM 100 MG: 100 CAPSULE, LIQUID FILLED ORAL at 20:20

## 2017-08-06 RX ADMIN — SODIUM BICARBONATE 325 MG: 325 TABLET ORAL at 20:20

## 2017-08-06 RX ADMIN — ENOXAPARIN SODIUM 40 MG: 40 INJECTION SUBCUTANEOUS at 13:01

## 2017-08-07 ENCOUNTER — APPOINTMENT (OUTPATIENT)
Dept: CT IMAGING | Age: 77
DRG: 392 | End: 2017-08-07
Attending: SURGERY
Payer: MEDICARE

## 2017-08-07 VITALS
RESPIRATION RATE: 17 BRPM | SYSTOLIC BLOOD PRESSURE: 114 MMHG | TEMPERATURE: 97.7 F | OXYGEN SATURATION: 99 % | DIASTOLIC BLOOD PRESSURE: 66 MMHG | HEART RATE: 73 BPM

## 2017-08-07 PROCEDURE — 74176 CT ABD & PELVIS W/O CONTRAST: CPT

## 2017-08-07 PROCEDURE — 6360000002 HC RX W HCPCS: Performed by: SURGERY

## 2017-08-07 PROCEDURE — 2580000003 HC RX 258: Performed by: PHYSICIAN ASSISTANT

## 2017-08-07 PROCEDURE — 6370000000 HC RX 637 (ALT 250 FOR IP): Performed by: SURGERY

## 2017-08-07 RX ORDER — AMOXICILLIN AND CLAVULANATE POTASSIUM 875; 125 MG/1; MG/1
1 TABLET, FILM COATED ORAL EVERY 12 HOURS SCHEDULED
Status: DISCONTINUED | OUTPATIENT
Start: 2017-08-07 | End: 2017-08-07 | Stop reason: HOSPADM

## 2017-08-07 RX ORDER — AMOXICILLIN AND CLAVULANATE POTASSIUM 875; 125 MG/1; MG/1
1 TABLET, FILM COATED ORAL EVERY 12 HOURS SCHEDULED
Qty: 28 TABLET | Refills: 0 | Status: SHIPPED | OUTPATIENT
Start: 2017-08-07 | End: 2017-08-21

## 2017-08-07 RX ORDER — POLYETHYLENE GLYCOL 3350 17 G/17G
17 POWDER, FOR SOLUTION ORAL DAILY
Status: DISCONTINUED | OUTPATIENT
Start: 2017-08-07 | End: 2017-08-07 | Stop reason: HOSPADM

## 2017-08-07 RX ORDER — SACCHAROMYCES BOULARDII 250 MG
250 CAPSULE ORAL 2 TIMES DAILY
Status: DISCONTINUED | OUTPATIENT
Start: 2017-08-07 | End: 2017-08-07 | Stop reason: HOSPADM

## 2017-08-07 RX ADMIN — POLYETHYLENE GLYCOL 3350 17 G: 17 POWDER, FOR SOLUTION ORAL at 09:39

## 2017-08-07 RX ADMIN — DOCUSATE SODIUM 100 MG: 100 CAPSULE, LIQUID FILLED ORAL at 09:41

## 2017-08-07 RX ADMIN — ENOXAPARIN SODIUM 40 MG: 40 INJECTION SUBCUTANEOUS at 13:24

## 2017-08-07 RX ADMIN — Medication 250 MG: at 09:40

## 2017-08-07 RX ADMIN — METOPROLOL SUCCINATE 100 MG: 50 TABLET, EXTENDED RELEASE ORAL at 09:40

## 2017-08-07 RX ADMIN — SODIUM BICARBONATE 325 MG: 325 TABLET ORAL at 09:40

## 2017-08-07 RX ADMIN — HYDROCHLOROTHIAZIDE 25 MG: 25 TABLET ORAL at 09:40

## 2017-08-07 RX ADMIN — PANTOPRAZOLE SODIUM 40 MG: 40 TABLET, DELAYED RELEASE ORAL at 09:41

## 2017-08-07 RX ADMIN — NIFEDIPINE 60 MG: 60 TABLET, FILM COATED, EXTENDED RELEASE ORAL at 09:41

## 2017-08-07 RX ADMIN — LOSARTAN POTASSIUM 100 MG: 100 TABLET, FILM COATED ORAL at 09:41

## 2017-08-07 RX ADMIN — AMOXICILLIN AND CLAVULANATE POTASSIUM 1 TABLET: 875; 125 TABLET, FILM COATED ORAL at 09:40

## 2017-08-07 RX ADMIN — Medication 10 ML: at 10:24

## 2017-08-16 ENCOUNTER — TELEPHONE (OUTPATIENT)
Dept: SURGERY | Age: 77
End: 2017-08-16

## 2017-08-21 ENCOUNTER — TELEPHONE (OUTPATIENT)
Dept: SURGERY | Age: 77
End: 2017-08-21

## 2017-08-29 ENCOUNTER — OFFICE VISIT (OUTPATIENT)
Dept: SURGERY | Age: 77
End: 2017-08-29
Payer: MEDICARE

## 2017-08-29 VITALS
SYSTOLIC BLOOD PRESSURE: 124 MMHG | HEIGHT: 71 IN | WEIGHT: 213 LBS | BODY MASS INDEX: 29.82 KG/M2 | DIASTOLIC BLOOD PRESSURE: 80 MMHG | TEMPERATURE: 98.1 F

## 2017-08-29 DIAGNOSIS — K57.20 COLONIC DIVERTICULAR ABSCESS: Primary | ICD-10-CM

## 2017-08-29 PROCEDURE — G8417 CALC BMI ABV UP PARAM F/U: HCPCS | Performed by: PHYSICIAN ASSISTANT

## 2017-08-29 PROCEDURE — 1111F DSCHRG MED/CURRENT MED MERGE: CPT | Performed by: PHYSICIAN ASSISTANT

## 2017-08-29 PROCEDURE — 4040F PNEUMOC VAC/ADMIN/RCVD: CPT | Performed by: PHYSICIAN ASSISTANT

## 2017-08-29 PROCEDURE — 1123F ACP DISCUSS/DSCN MKR DOCD: CPT | Performed by: PHYSICIAN ASSISTANT

## 2017-08-29 PROCEDURE — 1036F TOBACCO NON-USER: CPT | Performed by: PHYSICIAN ASSISTANT

## 2017-08-29 PROCEDURE — G8427 DOCREV CUR MEDS BY ELIG CLIN: HCPCS | Performed by: PHYSICIAN ASSISTANT

## 2017-08-29 PROCEDURE — 99212 OFFICE O/P EST SF 10 MIN: CPT | Performed by: PHYSICIAN ASSISTANT

## 2017-09-11 ENCOUNTER — ANESTHESIA (OUTPATIENT)
Dept: ENDOSCOPY | Age: 77
End: 2017-09-11
Payer: MEDICARE

## 2017-09-11 ENCOUNTER — HOSPITAL ENCOUNTER (OUTPATIENT)
Age: 77
Setting detail: OUTPATIENT SURGERY
Discharge: HOME | End: 2017-09-11
Attending: INTERNAL MEDICINE | Admitting: INTERNAL MEDICINE
Payer: MEDICARE

## 2017-09-11 ENCOUNTER — ANESTHESIA EVENT (OUTPATIENT)
Dept: ENDOSCOPY | Age: 77
End: 2017-09-11
Payer: MEDICARE

## 2017-09-11 VITALS
OXYGEN SATURATION: 92 % | HEART RATE: 64 BPM | SYSTOLIC BLOOD PRESSURE: 143 MMHG | RESPIRATION RATE: 16 BRPM | BODY MASS INDEX: 27.5 KG/M2 | TEMPERATURE: 98.8 F | HEIGHT: 72 IN | DIASTOLIC BLOOD PRESSURE: 71 MMHG | WEIGHT: 203 LBS

## 2017-09-11 VITALS
RESPIRATION RATE: 17 BRPM | SYSTOLIC BLOOD PRESSURE: 142 MMHG | OXYGEN SATURATION: 98 % | DIASTOLIC BLOOD PRESSURE: 68 MMHG

## 2017-09-11 PROCEDURE — 88305 TISSUE EXAM BY PATHOLOGIST: CPT

## 2017-09-11 PROCEDURE — 7100000010 HC PHASE II RECOVERY - FIRST 15 MIN: Performed by: INTERNAL MEDICINE

## 2017-09-11 PROCEDURE — 3609009500 HC COLONOSCOPY DIAGNOSTIC OR SCREENING: Performed by: INTERNAL MEDICINE

## 2017-09-11 PROCEDURE — 2580000003 HC RX 258: Performed by: INTERNAL MEDICINE

## 2017-09-11 PROCEDURE — 3700000001 HC ADD 15 MINUTES (ANESTHESIA): Performed by: INTERNAL MEDICINE

## 2017-09-11 PROCEDURE — 7100000011 HC PHASE II RECOVERY - ADDTL 15 MIN: Performed by: INTERNAL MEDICINE

## 2017-09-11 PROCEDURE — 2500000003 HC RX 250 WO HCPCS: Performed by: NURSE ANESTHETIST, CERTIFIED REGISTERED

## 2017-09-11 PROCEDURE — 6360000002 HC RX W HCPCS: Performed by: NURSE ANESTHETIST, CERTIFIED REGISTERED

## 2017-09-11 PROCEDURE — 3700000000 HC ANESTHESIA ATTENDED CARE: Performed by: INTERNAL MEDICINE

## 2017-09-11 PROCEDURE — 45380 COLONOSCOPY AND BIOPSY: CPT | Performed by: INTERNAL MEDICINE

## 2017-09-11 RX ORDER — SODIUM CHLORIDE, SODIUM LACTATE, POTASSIUM CHLORIDE, CALCIUM CHLORIDE 600; 310; 30; 20 MG/100ML; MG/100ML; MG/100ML; MG/100ML
INJECTION, SOLUTION INTRAVENOUS CONTINUOUS
Status: DISCONTINUED | OUTPATIENT
Start: 2017-09-11 | End: 2017-09-11 | Stop reason: HOSPADM

## 2017-09-11 RX ORDER — LIDOCAINE HYDROCHLORIDE 20 MG/ML
INJECTION, SOLUTION INFILTRATION; PERINEURAL PRN
Status: DISCONTINUED | OUTPATIENT
Start: 2017-09-11 | End: 2017-09-11 | Stop reason: SDUPTHER

## 2017-09-11 RX ORDER — PROPOFOL 10 MG/ML
INJECTION, EMULSION INTRAVENOUS PRN
Status: DISCONTINUED | OUTPATIENT
Start: 2017-09-11 | End: 2017-09-11 | Stop reason: SDUPTHER

## 2017-09-11 RX ORDER — LIDOCAINE HYDROCHLORIDE 10 MG/ML
1 INJECTION, SOLUTION INFILTRATION; PERINEURAL ONCE
Status: DISCONTINUED | OUTPATIENT
Start: 2017-09-11 | End: 2017-09-11 | Stop reason: HOSPADM

## 2017-09-11 RX ADMIN — LIDOCAINE HYDROCHLORIDE 40 MG: 20 INJECTION, SOLUTION INFILTRATION; PERINEURAL at 10:33

## 2017-09-11 RX ADMIN — SODIUM CHLORIDE, SODIUM LACTATE, POTASSIUM CHLORIDE, AND CALCIUM CHLORIDE: 600; 310; 30; 20 INJECTION, SOLUTION INTRAVENOUS at 10:25

## 2017-09-11 RX ADMIN — PROPOFOL 140 MG: 10 INJECTION, EMULSION INTRAVENOUS at 10:33

## 2017-12-13 ENCOUNTER — HOSPITAL ENCOUNTER (OUTPATIENT)
Dept: NEUROLOGY | Age: 77
Discharge: HOME | End: 2017-12-13

## 2017-12-13 ENCOUNTER — HOSPITAL ENCOUNTER (OUTPATIENT)
Dept: NEUROLOGY | Age: 77
Discharge: HOME | End: 2017-12-13
Payer: MEDICARE

## 2017-12-13 PROCEDURE — 95886 MUSC TEST DONE W/N TEST COMP: CPT

## 2017-12-13 PROCEDURE — 95886 MUSC TEST DONE W/N TEST COMP: CPT | Performed by: PSYCHIATRY & NEUROLOGY

## 2017-12-13 PROCEDURE — 95910 NRV CNDJ TEST 7-8 STUDIES: CPT | Performed by: PSYCHIATRY & NEUROLOGY

## 2017-12-13 PROCEDURE — 95910 NRV CNDJ TEST 7-8 STUDIES: CPT

## 2018-02-06 ENCOUNTER — HOSPITAL ENCOUNTER (OUTPATIENT)
Dept: VASCULAR LAB | Age: 78
Discharge: HOME | End: 2018-02-06
Payer: MEDICARE

## 2018-02-06 DIAGNOSIS — I73.9 CLAUDICATION (HCC): Primary | ICD-10-CM

## 2018-02-06 PROCEDURE — 93923 UPR/LXTR ART STDY 3+ LVLS: CPT

## 2018-06-18 ENCOUNTER — OFFICE VISIT (OUTPATIENT)
Dept: VASCULAR SURGERY | Age: 78
End: 2018-06-18
Payer: MEDICARE

## 2018-06-18 VITALS
TEMPERATURE: 97.3 F | HEART RATE: 61 BPM | DIASTOLIC BLOOD PRESSURE: 81 MMHG | SYSTOLIC BLOOD PRESSURE: 142 MMHG | RESPIRATION RATE: 18 BRPM

## 2018-06-18 DIAGNOSIS — R09.89 BRUIT OF LEFT CAROTID ARTERY: Primary | ICD-10-CM

## 2018-06-18 DIAGNOSIS — I70.213 ATHEROSCLEROSIS OF NATIVE ARTERY OF BOTH LOWER EXTREMITIES WITH INTERMITTENT CLAUDICATION (HCC): ICD-10-CM

## 2018-06-18 PROCEDURE — G8417 CALC BMI ABV UP PARAM F/U: HCPCS | Performed by: NURSE PRACTITIONER

## 2018-06-18 PROCEDURE — G8427 DOCREV CUR MEDS BY ELIG CLIN: HCPCS | Performed by: NURSE PRACTITIONER

## 2018-06-18 PROCEDURE — G8599 NO ASA/ANTIPLAT THER USE RNG: HCPCS | Performed by: NURSE PRACTITIONER

## 2018-06-18 PROCEDURE — 4040F PNEUMOC VAC/ADMIN/RCVD: CPT | Performed by: NURSE PRACTITIONER

## 2018-06-18 PROCEDURE — 1123F ACP DISCUSS/DSCN MKR DOCD: CPT | Performed by: NURSE PRACTITIONER

## 2018-06-18 PROCEDURE — 99213 OFFICE O/P EST LOW 20 MIN: CPT | Performed by: NURSE PRACTITIONER

## 2018-06-18 PROCEDURE — 1036F TOBACCO NON-USER: CPT | Performed by: NURSE PRACTITIONER

## 2018-06-18 RX ORDER — IBUPROFEN 800 MG/1
800 TABLET ORAL EVERY 6 HOURS PRN
COMMUNITY
End: 2018-07-10

## 2018-06-19 ENCOUNTER — TELEPHONE (OUTPATIENT)
Dept: VASCULAR SURGERY | Age: 78
End: 2018-06-19

## 2018-06-22 ENCOUNTER — HOSPITAL ENCOUNTER (OUTPATIENT)
Dept: VASCULAR LAB | Age: 78
Discharge: HOME | End: 2018-06-22
Payer: MEDICARE

## 2018-06-22 DIAGNOSIS — I70.213 ATHEROSCLEROSIS OF NATIVE ARTERY OF BOTH LOWER EXTREMITIES WITH INTERMITTENT CLAUDICATION (HCC): ICD-10-CM

## 2018-06-22 DIAGNOSIS — R09.89 BRUIT OF LEFT CAROTID ARTERY: ICD-10-CM

## 2018-06-22 PROCEDURE — 93922 UPR/L XTREMITY ART 2 LEVELS: CPT

## 2018-06-22 PROCEDURE — 93926 LOWER EXTREMITY STUDY: CPT

## 2018-06-22 PROCEDURE — 93880 EXTRACRANIAL BILAT STUDY: CPT

## 2018-06-25 ENCOUNTER — OFFICE VISIT (OUTPATIENT)
Dept: VASCULAR SURGERY | Age: 78
End: 2018-06-25
Payer: MEDICARE

## 2018-06-25 VITALS
TEMPERATURE: 97.3 F | DIASTOLIC BLOOD PRESSURE: 76 MMHG | SYSTOLIC BLOOD PRESSURE: 151 MMHG | HEART RATE: 75 BPM | RESPIRATION RATE: 18 BRPM

## 2018-06-25 DIAGNOSIS — I70.213 ATHEROSCLEROSIS OF NATIVE ARTERY OF BOTH LOWER EXTREMITIES WITH INTERMITTENT CLAUDICATION (HCC): ICD-10-CM

## 2018-06-25 DIAGNOSIS — I65.23 BILATERAL CAROTID ARTERY STENOSIS: ICD-10-CM

## 2018-06-25 PROCEDURE — 4040F PNEUMOC VAC/ADMIN/RCVD: CPT | Performed by: NURSE PRACTITIONER

## 2018-06-25 PROCEDURE — 1036F TOBACCO NON-USER: CPT | Performed by: NURSE PRACTITIONER

## 2018-06-25 PROCEDURE — G8427 DOCREV CUR MEDS BY ELIG CLIN: HCPCS | Performed by: NURSE PRACTITIONER

## 2018-06-25 PROCEDURE — G8599 NO ASA/ANTIPLAT THER USE RNG: HCPCS | Performed by: NURSE PRACTITIONER

## 2018-06-25 PROCEDURE — 1123F ACP DISCUSS/DSCN MKR DOCD: CPT | Performed by: NURSE PRACTITIONER

## 2018-06-25 PROCEDURE — G8417 CALC BMI ABV UP PARAM F/U: HCPCS | Performed by: NURSE PRACTITIONER

## 2018-06-25 PROCEDURE — 99213 OFFICE O/P EST LOW 20 MIN: CPT | Performed by: NURSE PRACTITIONER

## 2018-06-27 ENCOUNTER — HOSPITAL ENCOUNTER (OUTPATIENT)
Dept: CT IMAGING | Age: 78
Discharge: HOME | End: 2018-06-27
Payer: MEDICARE

## 2018-06-27 DIAGNOSIS — I65.23 BILATERAL CAROTID ARTERY STENOSIS: ICD-10-CM

## 2018-06-27 LAB
GFR NON-AFRICAN AMERICAN: 39
PERFORMED ON: ABNORMAL
POC CREATININE: 1.7 MG/DL (ref 0.3–1.3)
POC SAMPLE TYPE: ABNORMAL

## 2018-06-27 PROCEDURE — 82565 ASSAY OF CREATININE: CPT

## 2018-06-29 LAB
24HR URINE VOLUME (ML): 3100 ML
CREAT SERPL-MCNC: 1.4 MG/DL
CREATININE 24 HOUR URINE: 1.1 G/24HR (ref 1–2)
CREATININE CLEARANCE: 44 ML/MIN (ref 71–151)
Lab: 24 HR

## 2018-07-10 ENCOUNTER — HOSPITAL ENCOUNTER (OUTPATIENT)
Dept: PREADMISSION TESTING | Age: 78
Discharge: HOME | End: 2018-07-14
Payer: MEDICARE

## 2018-07-10 VITALS — WEIGHT: 233 LBS | BODY MASS INDEX: 31.56 KG/M2 | HEIGHT: 72 IN

## 2018-07-10 LAB
ANION GAP SERPL CALCULATED.3IONS-SCNC: 19 MMOL/L (ref 7–19)
BASOPHILS ABSOLUTE: 0.1 K/UL (ref 0–0.2)
BASOPHILS RELATIVE PERCENT: 1 % (ref 0–1)
BUN BLDV-MCNC: 29 MG/DL (ref 8–23)
CALCIUM SERPL-MCNC: 9.4 MG/DL (ref 8.8–10.2)
CHLORIDE BLD-SCNC: 95 MMOL/L (ref 98–111)
CO2: 24 MMOL/L (ref 22–29)
CREAT SERPL-MCNC: 1.5 MG/DL (ref 0.5–1.2)
EOSINOPHILS ABSOLUTE: 0 K/UL (ref 0–0.6)
EOSINOPHILS RELATIVE PERCENT: 0.1 % (ref 0–5)
GFR NON-AFRICAN AMERICAN: 45
GLUCOSE BLD-MCNC: 133 MG/DL (ref 74–109)
HCT VFR BLD CALC: 47.8 % (ref 42–52)
HEMOGLOBIN: 16.2 G/DL (ref 14–18)
LYMPHOCYTES ABSOLUTE: 1.6 K/UL (ref 1.1–4.5)
LYMPHOCYTES RELATIVE PERCENT: 21.6 % (ref 20–40)
MCH RBC QN AUTO: 29.8 PG (ref 27–31)
MCHC RBC AUTO-ENTMCNC: 33.9 G/DL (ref 33–37)
MCV RBC AUTO: 88 FL (ref 80–94)
MONOCYTES ABSOLUTE: 0.7 K/UL (ref 0–0.9)
MONOCYTES RELATIVE PERCENT: 9.6 % (ref 0–10)
NEUTROPHILS ABSOLUTE: 4.9 K/UL (ref 1.5–7.5)
NEUTROPHILS RELATIVE PERCENT: 67 % (ref 50–65)
PDW BLD-RTO: 12 % (ref 11.5–14.5)
PLATELET # BLD: 196 K/UL (ref 130–400)
PMV BLD AUTO: 11.8 FL (ref 9.4–12.4)
POTASSIUM SERPL-SCNC: 4.1 MMOL/L (ref 3.5–5)
RBC # BLD: 5.43 M/UL (ref 4.7–6.1)
SODIUM BLD-SCNC: 138 MMOL/L (ref 136–145)
WBC # BLD: 7.3 K/UL (ref 4.8–10.8)

## 2018-07-10 PROCEDURE — 93005 ELECTROCARDIOGRAM TRACING: CPT

## 2018-07-10 PROCEDURE — 80048 BASIC METABOLIC PNL TOTAL CA: CPT

## 2018-07-10 PROCEDURE — 85025 COMPLETE CBC W/AUTO DIFF WBC: CPT

## 2018-07-12 LAB
EKG P AXIS: 53 DEGREES
EKG P-R INTERVAL: 190 MS
EKG Q-T INTERVAL: 414 MS
EKG QRS DURATION: 104 MS
EKG QTC CALCULATION (BAZETT): 419 MS
EKG T AXIS: 51 DEGREES

## 2018-07-18 ENCOUNTER — ANESTHESIA EVENT (OUTPATIENT)
Dept: OPERATING ROOM | Age: 78
End: 2018-07-18
Payer: MEDICARE

## 2018-07-18 ENCOUNTER — ANESTHESIA (OUTPATIENT)
Dept: OPERATING ROOM | Age: 78
End: 2018-07-18
Payer: MEDICARE

## 2018-07-18 ENCOUNTER — HOSPITAL ENCOUNTER (OUTPATIENT)
Age: 78
Setting detail: OUTPATIENT SURGERY
Discharge: HOME | End: 2018-07-18
Attending: ORTHOPAEDIC SURGERY | Admitting: ORTHOPAEDIC SURGERY
Payer: MEDICARE

## 2018-07-18 VITALS
WEIGHT: 233 LBS | BODY MASS INDEX: 31.56 KG/M2 | TEMPERATURE: 97.4 F | RESPIRATION RATE: 12 BRPM | DIASTOLIC BLOOD PRESSURE: 83 MMHG | HEART RATE: 57 BPM | OXYGEN SATURATION: 93 % | HEIGHT: 72 IN | SYSTOLIC BLOOD PRESSURE: 152 MMHG

## 2018-07-18 VITALS
RESPIRATION RATE: 5 BRPM | SYSTOLIC BLOOD PRESSURE: 99 MMHG | OXYGEN SATURATION: 99 % | DIASTOLIC BLOOD PRESSURE: 47 MMHG

## 2018-07-18 DIAGNOSIS — K57.20 DIVERTICULITIS OF LARGE INTESTINE WITH PERFORATION WITHOUT BLEEDING: Primary | ICD-10-CM

## 2018-07-18 DIAGNOSIS — G56.02 CARPAL TUNNEL SYNDROME OF LEFT WRIST: ICD-10-CM

## 2018-07-18 PROCEDURE — 3700000000 HC ANESTHESIA ATTENDED CARE: Performed by: ORTHOPAEDIC SURGERY

## 2018-07-18 PROCEDURE — 7100000010 HC PHASE II RECOVERY - FIRST 15 MIN: Performed by: ORTHOPAEDIC SURGERY

## 2018-07-18 PROCEDURE — 7100000000 HC PACU RECOVERY - FIRST 15 MIN: Performed by: ORTHOPAEDIC SURGERY

## 2018-07-18 PROCEDURE — 6360000002 HC RX W HCPCS

## 2018-07-18 PROCEDURE — 6360000002 HC RX W HCPCS: Performed by: ORTHOPAEDIC SURGERY

## 2018-07-18 PROCEDURE — 2580000003 HC RX 258: Performed by: ORTHOPAEDIC SURGERY

## 2018-07-18 PROCEDURE — 3700000001 HC ADD 15 MINUTES (ANESTHESIA): Performed by: ORTHOPAEDIC SURGERY

## 2018-07-18 PROCEDURE — 2709999900 HC NON-CHARGEABLE SUPPLY: Performed by: ORTHOPAEDIC SURGERY

## 2018-07-18 PROCEDURE — 7100000001 HC PACU RECOVERY - ADDTL 15 MIN: Performed by: ORTHOPAEDIC SURGERY

## 2018-07-18 PROCEDURE — 3600000013 HC SURGERY LEVEL 3 ADDTL 15MIN: Performed by: ORTHOPAEDIC SURGERY

## 2018-07-18 PROCEDURE — 3600000003 HC SURGERY LEVEL 3 BASE: Performed by: ORTHOPAEDIC SURGERY

## 2018-07-18 PROCEDURE — 2500000003 HC RX 250 WO HCPCS

## 2018-07-18 RX ORDER — MORPHINE SULFATE 1 MG/ML
2 INJECTION, SOLUTION EPIDURAL; INTRATHECAL; INTRAVENOUS EVERY 5 MIN PRN
Status: DISCONTINUED | OUTPATIENT
Start: 2018-07-18 | End: 2018-07-18 | Stop reason: HOSPADM

## 2018-07-18 RX ORDER — MEPERIDINE HYDROCHLORIDE 50 MG/ML
12.5 INJECTION INTRAMUSCULAR; INTRAVENOUS; SUBCUTANEOUS EVERY 5 MIN PRN
Status: DISCONTINUED | OUTPATIENT
Start: 2018-07-18 | End: 2018-07-18 | Stop reason: HOSPADM

## 2018-07-18 RX ORDER — HYDRALAZINE HYDROCHLORIDE 20 MG/ML
5 INJECTION INTRAMUSCULAR; INTRAVENOUS EVERY 10 MIN PRN
Status: DISCONTINUED | OUTPATIENT
Start: 2018-07-18 | End: 2018-07-18 | Stop reason: HOSPADM

## 2018-07-18 RX ORDER — LIDOCAINE HYDROCHLORIDE 10 MG/ML
1 INJECTION, SOLUTION EPIDURAL; INFILTRATION; INTRACAUDAL; PERINEURAL
Status: DISCONTINUED | OUTPATIENT
Start: 2018-07-18 | End: 2018-07-18 | Stop reason: HOSPADM

## 2018-07-18 RX ORDER — MIDAZOLAM HYDROCHLORIDE 1 MG/ML
2 INJECTION INTRAMUSCULAR; INTRAVENOUS
Status: DISCONTINUED | OUTPATIENT
Start: 2018-07-18 | End: 2018-07-18 | Stop reason: HOSPADM

## 2018-07-18 RX ORDER — EPHEDRINE SULFATE 50 MG/ML
INJECTION, SOLUTION INTRAVENOUS PRN
Status: DISCONTINUED | OUTPATIENT
Start: 2018-07-18 | End: 2018-07-18 | Stop reason: SDUPTHER

## 2018-07-18 RX ORDER — HYDROMORPHONE HCL IN 0.9% NACL 0.5 MG/ML
0.5 SYRINGE (ML) INTRAVENOUS EVERY 5 MIN PRN
Status: DISCONTINUED | OUTPATIENT
Start: 2018-07-18 | End: 2018-07-18 | Stop reason: HOSPADM

## 2018-07-18 RX ORDER — FENTANYL CITRATE 50 UG/ML
INJECTION, SOLUTION INTRAMUSCULAR; INTRAVENOUS PRN
Status: DISCONTINUED | OUTPATIENT
Start: 2018-07-18 | End: 2018-07-18 | Stop reason: SDUPTHER

## 2018-07-18 RX ORDER — METOCLOPRAMIDE HYDROCHLORIDE 5 MG/ML
10 INJECTION INTRAMUSCULAR; INTRAVENOUS
Status: DISCONTINUED | OUTPATIENT
Start: 2018-07-18 | End: 2018-07-18 | Stop reason: HOSPADM

## 2018-07-18 RX ORDER — MORPHINE SULFATE 1 MG/ML
4 INJECTION, SOLUTION EPIDURAL; INTRATHECAL; INTRAVENOUS EVERY 5 MIN PRN
Status: DISCONTINUED | OUTPATIENT
Start: 2018-07-18 | End: 2018-07-18 | Stop reason: HOSPADM

## 2018-07-18 RX ORDER — ONDANSETRON 2 MG/ML
INJECTION INTRAMUSCULAR; INTRAVENOUS PRN
Status: DISCONTINUED | OUTPATIENT
Start: 2018-07-18 | End: 2018-07-18 | Stop reason: SDUPTHER

## 2018-07-18 RX ORDER — HYDROMORPHONE HCL IN 0.9% NACL 0.5 MG/ML
0.25 SYRINGE (ML) INTRAVENOUS EVERY 5 MIN PRN
Status: DISCONTINUED | OUTPATIENT
Start: 2018-07-18 | End: 2018-07-18 | Stop reason: HOSPADM

## 2018-07-18 RX ORDER — LIDOCAINE HYDROCHLORIDE 10 MG/ML
INJECTION, SOLUTION EPIDURAL; INFILTRATION; INTRACAUDAL; PERINEURAL PRN
Status: DISCONTINUED | OUTPATIENT
Start: 2018-07-18 | End: 2018-07-18 | Stop reason: SDUPTHER

## 2018-07-18 RX ORDER — SODIUM CHLORIDE, SODIUM LACTATE, POTASSIUM CHLORIDE, CALCIUM CHLORIDE 600; 310; 30; 20 MG/100ML; MG/100ML; MG/100ML; MG/100ML
INJECTION, SOLUTION INTRAVENOUS CONTINUOUS
Status: DISCONTINUED | OUTPATIENT
Start: 2018-07-18 | End: 2018-07-18 | Stop reason: HOSPADM

## 2018-07-18 RX ORDER — ROPIVACAINE HYDROCHLORIDE 2 MG/ML
INJECTION, SOLUTION EPIDURAL; INFILTRATION; PERINEURAL PRN
Status: DISCONTINUED | OUTPATIENT
Start: 2018-07-18 | End: 2018-07-18 | Stop reason: HOSPADM

## 2018-07-18 RX ORDER — OXYCODONE HYDROCHLORIDE 5 MG/1
TABLET ORAL
Qty: 50 TABLET | Refills: 0 | Status: SHIPPED | OUTPATIENT
Start: 2018-07-18 | End: 2018-08-18

## 2018-07-18 RX ORDER — SODIUM CHLORIDE 0.9 % (FLUSH) 0.9 %
10 SYRINGE (ML) INJECTION PRN
Status: DISCONTINUED | OUTPATIENT
Start: 2018-07-18 | End: 2018-07-18 | Stop reason: HOSPADM

## 2018-07-18 RX ORDER — SODIUM CHLORIDE 0.9 % (FLUSH) 0.9 %
10 SYRINGE (ML) INJECTION EVERY 12 HOURS SCHEDULED
Status: DISCONTINUED | OUTPATIENT
Start: 2018-07-18 | End: 2018-07-18 | Stop reason: HOSPADM

## 2018-07-18 RX ORDER — DEXAMETHASONE SODIUM PHOSPHATE 10 MG/ML
INJECTION INTRAMUSCULAR; INTRAVENOUS PRN
Status: DISCONTINUED | OUTPATIENT
Start: 2018-07-18 | End: 2018-07-18 | Stop reason: SDUPTHER

## 2018-07-18 RX ORDER — FENTANYL CITRATE 50 UG/ML
25 INJECTION, SOLUTION INTRAMUSCULAR; INTRAVENOUS
Status: DISCONTINUED | OUTPATIENT
Start: 2018-07-18 | End: 2018-07-18 | Stop reason: HOSPADM

## 2018-07-18 RX ORDER — MIDAZOLAM HYDROCHLORIDE 1 MG/ML
INJECTION INTRAMUSCULAR; INTRAVENOUS PRN
Status: DISCONTINUED | OUTPATIENT
Start: 2018-07-18 | End: 2018-07-18 | Stop reason: SDUPTHER

## 2018-07-18 RX ORDER — DIPHENHYDRAMINE HYDROCHLORIDE 50 MG/ML
12.5 INJECTION INTRAMUSCULAR; INTRAVENOUS
Status: DISCONTINUED | OUTPATIENT
Start: 2018-07-18 | End: 2018-07-18 | Stop reason: HOSPADM

## 2018-07-18 RX ORDER — SODIUM CHLORIDE 9 MG/ML
INJECTION, SOLUTION INTRAVENOUS CONTINUOUS
Status: DISCONTINUED | OUTPATIENT
Start: 2018-07-18 | End: 2018-07-18 | Stop reason: HOSPADM

## 2018-07-18 RX ORDER — ENALAPRILAT 2.5 MG/2ML
1.25 INJECTION INTRAVENOUS
Status: DISCONTINUED | OUTPATIENT
Start: 2018-07-18 | End: 2018-07-18 | Stop reason: HOSPADM

## 2018-07-18 RX ORDER — PROPOFOL 10 MG/ML
INJECTION, EMULSION INTRAVENOUS PRN
Status: DISCONTINUED | OUTPATIENT
Start: 2018-07-18 | End: 2018-07-18 | Stop reason: SDUPTHER

## 2018-07-18 RX ORDER — FENTANYL CITRATE 50 UG/ML
50 INJECTION, SOLUTION INTRAMUSCULAR; INTRAVENOUS
Status: DISCONTINUED | OUTPATIENT
Start: 2018-07-18 | End: 2018-07-18 | Stop reason: HOSPADM

## 2018-07-18 RX ORDER — LABETALOL HYDROCHLORIDE 5 MG/ML
5 INJECTION, SOLUTION INTRAVENOUS EVERY 10 MIN PRN
Status: DISCONTINUED | OUTPATIENT
Start: 2018-07-18 | End: 2018-07-18 | Stop reason: HOSPADM

## 2018-07-18 RX ADMIN — MIDAZOLAM 1 MG: 1 INJECTION INTRAMUSCULAR; INTRAVENOUS at 07:34

## 2018-07-18 RX ADMIN — FENTANYL CITRATE 50 MCG: 50 INJECTION INTRAMUSCULAR; INTRAVENOUS at 07:37

## 2018-07-18 RX ADMIN — DEXAMETHASONE SODIUM PHOSPHATE 10 MG: 10 INJECTION INTRAMUSCULAR; INTRAVENOUS at 07:44

## 2018-07-18 RX ADMIN — MIDAZOLAM 1 MG: 1 INJECTION INTRAMUSCULAR; INTRAVENOUS at 07:29

## 2018-07-18 RX ADMIN — ONDANSETRON HYDROCHLORIDE 4 MG: 2 INJECTION, SOLUTION INTRAMUSCULAR; INTRAVENOUS at 07:44

## 2018-07-18 RX ADMIN — SODIUM CHLORIDE, POTASSIUM CHLORIDE, SODIUM LACTATE AND CALCIUM CHLORIDE: 600; 310; 30; 20 INJECTION, SOLUTION INTRAVENOUS at 06:33

## 2018-07-18 RX ADMIN — Medication 4 MG: at 09:10

## 2018-07-18 RX ADMIN — Medication 2 G: at 07:40

## 2018-07-18 RX ADMIN — PROPOFOL 80 MG: 10 INJECTION, EMULSION INTRAVENOUS at 07:37

## 2018-07-18 RX ADMIN — LIDOCAINE HYDROCHLORIDE 40 MG: 10 INJECTION, SOLUTION EPIDURAL; INFILTRATION; INTRACAUDAL; PERINEURAL at 07:37

## 2018-07-18 RX ADMIN — PROPOFOL 20 MG: 10 INJECTION, EMULSION INTRAVENOUS at 07:59

## 2018-07-18 RX ADMIN — EPHEDRINE SULFATE 10 MG: 50 INJECTION, SOLUTION INTRAMUSCULAR; INTRAVENOUS; SUBCUTANEOUS at 07:54

## 2018-07-18 RX ADMIN — FENTANYL CITRATE 50 MCG: 50 INJECTION INTRAMUSCULAR; INTRAVENOUS at 08:08

## 2018-07-18 ASSESSMENT — PAIN SCALES - GENERAL: PAINLEVEL_OUTOF10: 8

## 2018-07-18 NOTE — OP NOTE
Hemostasis was achieved. We injected with 20  mL of 0.2% ropivacaine in the subcutaneous tissues. The skin was  approximated with 3-0 Vicryl suture, and 4-0 nylon suture for the skin. Soft dressings were applied. The patient was taken to the recovery room in  stable condition. POSTOPERATIVE PLAN:  He will be on a typical open carpal tunnel release  protocol.           Lesa Jaquez MD    D: 07/18/2018 9:38:47      T: 07/18/2018 14:19:49     [email or web address]  Job#: [id number]     Doc#: [id number]    CC:

## 2018-07-18 NOTE — PROGRESS NOTES
Pt rates discomfort in rght hand 5/10. Offered pain medication but pt refuses at this time. Will asks if he needs pain medication.

## 2018-07-18 NOTE — ANESTHESIA POSTPROCEDURE EVALUATION
Department of Anesthesiology  Postprocedure Note    Patient: Barry Coffey  MRN: [id number]  YOB: 1940  Date of evaluation: 7/18/2018  Time:  8:36 AM     Procedure Summary     Date:  07/18/18 Room / Location:  [facility] OR  / [facility] OR    Anesthesia Start:  5513 Anesthesia Stop:  Nadine Pilion    Procedure:  OPEN CARPAL TUNNEL RELEASE (Left ) Diagnosis:  (G56.02)    Surgeon:  Qiana Sanchez MD Responsible Provider:  ELISABETH Brunson CRNA    Anesthesia Type:  general ASA Status:  3          Anesthesia Type: general    Cristy Phase I: Cristy Score: 4    Cristy Phase II:      Last vitals: Reviewed and per EMR flowsheets.        Anesthesia Post Evaluation    Patient location during evaluation: PACU  Patient participation: waiting for patient participation  Level of consciousness: sleepy but conscious  Pain score: 0  Airway patency: patent  Nausea & Vomiting: no nausea and no vomiting  Complications: no  Cardiovascular status: hemodynamically stable and blood pressure returned to baseline  Respiratory status: acceptable and room air  Hydration status: stable

## 2018-07-18 NOTE — BRIEF OP NOTE
Brief Postoperative Note  ______________________________________________________________    Patient: Miguel June  YOB: 1940  MRN: [id number]  Date of Procedure: 7/18/2018    Pre-Op Diagnosis: G56.02    Post-Op Diagnosis: Same       Procedure(s):  OPEN CARPAL TUNNEL RELEASE    Anesthesia: General    Surgeon(s):  Ana Oates MD    Staff:  Scrub Person First: Britni Olivares  Scrub Person Second: Ramona Garcia     Estimated Blood Loss: * No values recorded between 7/18/2018  7:32 AM and 7/18/2018  8:30 AM * None    Complications: None    Specimens:   * No specimens in log *    Implants:  * No implants in log *      Drains:   Open Drain Right RLQ  (Active)       Findings:     Ana Oates MD  Date: 7/18/2018  Time: 8:33 AM

## 2018-07-18 NOTE — DISCHARGE INSTR - ACTIVITY
Keep hand elevated and ice pack on for 30 minutes off for 30 minutes for the next few days. You may use ice longer if you feel the need and it helps with the pain but make sure there is a towel or something between your skin and the ice. Move fingers frequently. You may notice swelling if you leave your arm dangling as you walk so try to keep it up as much as possible.

## 2018-08-08 ENCOUNTER — TELEPHONE (OUTPATIENT)
Dept: VASCULAR SURGERY | Age: 78
End: 2018-08-08

## 2018-08-08 RX ORDER — ACETYLCYSTEINE 100 MG/ML
SOLUTION ORAL; RESPIRATORY (INHALATION)
Qty: 6 ML | Refills: 0 | OUTPATIENT
Start: 2018-08-08 | End: 2018-08-20 | Stop reason: ALTCHOICE

## 2018-08-08 NOTE — TELEPHONE ENCOUNTER
I sw the pt to let him know per [facility] it is okay for him to proceed with the CTA head/neck. Per nephrology the pt is to increase fluids by 20-30 oz prior and post procedure. A RX of mucomyst was sent to [facility]. He is to take this bid the day before, day of, and the day after the test. The pt is scheduled on 8/15/18, he is to arrive at the [facility] OP reg at 8:00 that morning being NPO 4 hours prior. Our office will call with results. Pt is aware.

## 2018-08-15 ENCOUNTER — HOSPITAL ENCOUNTER (OUTPATIENT)
Dept: CT IMAGING | Age: 78
Discharge: HOME | End: 2018-08-15
Payer: MEDICARE

## 2018-08-15 DIAGNOSIS — I65.23 BILATERAL CAROTID ARTERY STENOSIS: ICD-10-CM

## 2018-08-15 PROCEDURE — 70498 CT ANGIOGRAPHY NECK: CPT

## 2018-08-15 PROCEDURE — 6360000004 HC RX CONTRAST MEDICATION: Performed by: NURSE PRACTITIONER

## 2018-08-15 PROCEDURE — 82565 ASSAY OF CREATININE: CPT

## 2018-08-15 PROCEDURE — 70496 CT ANGIOGRAPHY HEAD: CPT

## 2018-08-15 RX ADMIN — IOPAMIDOL 90 ML: 755 INJECTION, SOLUTION INTRAVENOUS at 09:07

## 2018-08-20 ENCOUNTER — OFFICE VISIT (OUTPATIENT)
Dept: VASCULAR SURGERY | Age: 78
End: 2018-08-20
Payer: MEDICARE

## 2018-08-20 ENCOUNTER — HOSPITAL ENCOUNTER (OUTPATIENT)
Dept: PREADMISSION TESTING | Age: 78
Discharge: HOME | End: 2018-08-24
Payer: MEDICARE

## 2018-08-20 ENCOUNTER — HOSPITAL ENCOUNTER (OUTPATIENT)
Dept: GENERAL RADIOLOGY | Age: 78
Discharge: HOME | End: 2018-08-20
Payer: MEDICARE

## 2018-08-20 ENCOUNTER — PREP FOR PROCEDURE (OUTPATIENT)
Dept: VASCULAR SURGERY | Age: 78
End: 2018-08-20

## 2018-08-20 VITALS
SYSTOLIC BLOOD PRESSURE: 149 MMHG | OXYGEN SATURATION: 96 % | HEART RATE: 62 BPM | TEMPERATURE: 97.3 F | DIASTOLIC BLOOD PRESSURE: 75 MMHG

## 2018-08-20 VITALS — HEIGHT: 72 IN | WEIGHT: 233 LBS | BODY MASS INDEX: 31.56 KG/M2

## 2018-08-20 DIAGNOSIS — Z01.818 PRE-OP TESTING: ICD-10-CM

## 2018-08-20 DIAGNOSIS — I65.23 BILATERAL CAROTID ARTERY STENOSIS: Primary | ICD-10-CM

## 2018-08-20 DIAGNOSIS — I65.23 BILATERAL CAROTID ARTERY STENOSIS: ICD-10-CM

## 2018-08-20 DIAGNOSIS — Z01.818 PRE-OP TESTING: Primary | ICD-10-CM

## 2018-08-20 DIAGNOSIS — I70.213 ATHEROSCLEROSIS OF NATIVE ARTERY OF BOTH LOWER EXTREMITIES WITH INTERMITTENT CLAUDICATION (HCC): ICD-10-CM

## 2018-08-20 LAB
ANION GAP SERPL CALCULATED.3IONS-SCNC: 18 MMOL/L (ref 7–19)
APTT: 31.5 SEC (ref 26–36.2)
BUN BLDV-MCNC: 26 MG/DL (ref 8–23)
CALCIUM SERPL-MCNC: 9.8 MG/DL (ref 8.8–10.2)
CHLORIDE BLD-SCNC: 96 MMOL/L (ref 98–111)
CO2: 23 MMOL/L (ref 22–29)
CREAT SERPL-MCNC: 1.4 MG/DL (ref 0.5–1.2)
GFR NON-AFRICAN AMERICAN: 49
GLUCOSE BLD-MCNC: 148 MG/DL (ref 74–109)
HCT VFR BLD CALC: 51.7 % (ref 42–52)
HEMOGLOBIN: 17.1 G/DL (ref 14–18)
INR BLD: 1.04 (ref 0.88–1.18)
MCH RBC QN AUTO: 29.2 PG (ref 27–31)
MCHC RBC AUTO-ENTMCNC: 33.1 G/DL (ref 33–37)
MCV RBC AUTO: 88.2 FL (ref 80–94)
PDW BLD-RTO: 12 % (ref 11.5–14.5)
PLATELET # BLD: 184 K/UL (ref 130–400)
PMV BLD AUTO: 11.8 FL (ref 9.4–12.4)
POTASSIUM SERPL-SCNC: 3.8 MMOL/L (ref 3.5–5)
PROTHROMBIN TIME: 13.5 SEC (ref 12–14.6)
RBC # BLD: 5.86 M/UL (ref 4.7–6.1)
SODIUM BLD-SCNC: 137 MMOL/L (ref 136–145)
WBC # BLD: 8.3 K/UL (ref 4.8–10.8)

## 2018-08-20 PROCEDURE — 93005 ELECTROCARDIOGRAM TRACING: CPT

## 2018-08-20 PROCEDURE — 4040F PNEUMOC VAC/ADMIN/RCVD: CPT | Performed by: NURSE PRACTITIONER

## 2018-08-20 PROCEDURE — 1036F TOBACCO NON-USER: CPT | Performed by: NURSE PRACTITIONER

## 2018-08-20 PROCEDURE — 87081 CULTURE SCREEN ONLY: CPT

## 2018-08-20 PROCEDURE — 85730 THROMBOPLASTIN TIME PARTIAL: CPT

## 2018-08-20 PROCEDURE — G8427 DOCREV CUR MEDS BY ELIG CLIN: HCPCS | Performed by: NURSE PRACTITIONER

## 2018-08-20 PROCEDURE — 80048 BASIC METABOLIC PNL TOTAL CA: CPT

## 2018-08-20 PROCEDURE — 71046 X-RAY EXAM CHEST 2 VIEWS: CPT

## 2018-08-20 PROCEDURE — 1101F PT FALLS ASSESS-DOCD LE1/YR: CPT | Performed by: NURSE PRACTITIONER

## 2018-08-20 PROCEDURE — 85027 COMPLETE CBC AUTOMATED: CPT

## 2018-08-20 PROCEDURE — G8417 CALC BMI ABV UP PARAM F/U: HCPCS | Performed by: NURSE PRACTITIONER

## 2018-08-20 PROCEDURE — G8599 NO ASA/ANTIPLAT THER USE RNG: HCPCS | Performed by: NURSE PRACTITIONER

## 2018-08-20 PROCEDURE — 99214 OFFICE O/P EST MOD 30 MIN: CPT | Performed by: NURSE PRACTITIONER

## 2018-08-20 PROCEDURE — 85610 PROTHROMBIN TIME: CPT

## 2018-08-20 PROCEDURE — 1123F ACP DISCUSS/DSCN MKR DOCD: CPT | Performed by: NURSE PRACTITIONER

## 2018-08-20 RX ORDER — SODIUM CHLORIDE 0.9 % (FLUSH) 0.9 %
10 SYRINGE (ML) INJECTION EVERY 12 HOURS SCHEDULED
Status: CANCELLED | OUTPATIENT
Start: 2018-08-20 | End: 2019-08-20

## 2018-08-20 RX ORDER — SODIUM CHLORIDE 0.9 % (FLUSH) 0.9 %
10 SYRINGE (ML) INJECTION PRN
Status: CANCELLED | OUTPATIENT
Start: 2018-08-20 | End: 2019-08-20

## 2018-08-20 RX ORDER — ATORVASTATIN CALCIUM 10 MG/1
10 TABLET, FILM COATED ORAL DAILY
COMMUNITY
End: 2018-08-20 | Stop reason: SDUPTHER

## 2018-08-20 RX ORDER — ATORVASTATIN CALCIUM 20 MG/1
TABLET, FILM COATED ORAL
Refills: 1 | COMMUNITY
Start: 2018-08-17 | End: 2018-12-11

## 2018-08-20 RX ORDER — CLOPIDOGREL BISULFATE 75 MG/1
75 TABLET ORAL DAILY
Qty: 30 TABLET | Refills: 3 | Status: SHIPPED | OUTPATIENT
Start: 2018-08-20 | End: 2018-08-20 | Stop reason: CLARIF

## 2018-08-20 RX ORDER — ASPIRIN 81 MG/1
81 TABLET ORAL ONCE
Status: CANCELLED | OUTPATIENT
Start: 2018-08-20 | End: 2018-08-20

## 2018-08-20 NOTE — PROGRESS NOTES
REVISE MEDIAN N/CARPAL TUNNEL SURG Left 7/18/2018    OPEN CARPAL TUNNEL RELEASE performed by Jenni Suresh MD at [facility] VASCULAR SURGERY  04/21/2015    Radha BESS Ultrasound guided access of left common femoral artery. Aortogram.Diagnostic right lower extremity arteriogram.Radiologic supervision and interpretation.  VASCULAR SURGERY  01/13/2015    Radha Camilo M.D Atherectomy,angioplasty,and stenting of left superficial femoral artery.  VASCULAR SURGERY  03/11/2014    Radha Camilo M.D. Ultrasound-guided access of right common femoral artery. Aortogram.Left lower extremity arteriogram.Atherectomy and angioplasty of left superficial femoral artery. Radiologic supervision and interpretation.  VASCULAR SURGERY  01/18/2013    Radha BESS Aortogram.Multistation arteriogram right lower extremity. Laser atherectomy and angioplasty of right superficial femoral artery. Selective catheterization of right tibioperoneal trunk. Angioplasty of peroneal artery and tibioperoneal trunk. Family History   Problem Relation Age of Onset    Colon Cancer Father     Diabetes Brother     Colon Polyps Neg Hx     Liver Cancer Neg Hx     Liver Disease Neg Hx     Esophageal Cancer Neg Hx     Rectal Cancer Neg Hx     Stomach Cancer Neg Hx      Social History   Substance Use Topics    Smoking status: Former Smoker     Quit date: 6/3/2003    Smokeless tobacco: Never Used    Alcohol use 7.2 oz/week     12 Glasses of wine per week      Comment: 2 glasses of wine every night           Review of Systems    Constitutional  no significant activity change, appetite change, or unexpected weight change. No fever or chills. No diaphoresis or significant fatigue. HENT  no significant rhinorrhea or epistaxis. No tinnitus or significant hearing loss. Eyes  no sudden vision change or amaurosis. Respiratory  no significant shortness of breath, wheezing, or stridor.   No apnea, 4 quadrants. No guarding or rebound tenderness. No distension or palpable mass. Genitourinary  deferred. Musculoskeletal  ROM appears normal.  No significant edema. Neurologic  alert and oriented X 3. Physiologic. Face symmetric. Skin  warm, dry, and intact. No rash, erythema, or pallor. Psychiatric  mood, affect, and behavior appear normal.  Judgment and thought processes appear normal.    Doppler results:     Right CCA/ICA <50% stenotic  Left CCA/ICA 70-99% stenotic  Right vertebral artery flow is antegrade  Left vertebral artery flow is antegrade  Individual velocities reviewed: Yes. Results were reviewed with the patient. Disease process is new    CTA -   CT Neck Angiogram:   Common origin of the innominate and left common carotid arteries. There is atherosclerotic disease in the aortic arch extending to the   ostium of the great vessels, but without flow-limiting stenosis or   occlusion of the ostium. The bilateral subclavian arteries are patent   in their visualized segments. However, there is atherosclerotic   disease in the bilateral subclavian arteries with mild stenosis in the   proximal right subclavian artery (image 25, series 1). The right common carotid artery demonstrates atherosclerotic disease   throughout, but worst at the bifurcation, extending to the carotid   bulb and right external carotid artery. There is approximately 40%   stenosis in the right internal carotid artery related to   atherosclerotic disease. Additionally, there is a focal plaque in the   distal right cervical internal carotid artery, right at the junction   with the petrous segment which results in approximately 60% stenosis   for a segment of 8 mm. The left common carotid artery demonstrates atherosclerotic disease,   most severe at the bifurcation with near occlusion (more than 90%   stenosis. Involvement of the external carotid artery.  The extent of   the focal severe atherosclerotic disease is approximately 2.6 cm. Near   the junction with the petrous ICA, there is also focal atherosclerotic   disease, with approximately 40% stenosis for a segment of 1.8 cm. There is focal atherosclerotic disease of the ostium of the right   vertebral artery, with moderate stenosis. The remaining right V1 and   V2 and V3 are without flow-limiting stenosis or occlusion. Focal   atherosclerotic disease in the distal right forearm, with mild to   moderate stenosis for a segment of 4 mm. Focal atherosclerotic disease of the ostium of the left vertebral   artery, with mild stenosis. There is also mild atherosclerotic disease   in the left T1. Focal atherosclerotic disease in the left the 2 for a   segment of 6 mm with moderate to severe stenosis. The left V4 near the   vertebrobasilar junction also shows moderate stenosis related to focal   atherosclerotic disease for a segment of 5 mm. The basilar artery   demonstrate no flow-limiting stenosis or occlusion. Multilevel degenerative changes. CT Head Angiogram:   Atherosclerotic disease is seen in in the bilateral petrous ICAs, with   mild-to-moderate stenosis. There is moderate to severe stenosis in the   right cavernous segment of the ICA. Moderate stenosis in the left   cavernous segment ICA. The bilateral ACAs and MCAs are patent, without   flow-limiting stenosis or occlusion. The bilateral PCAs and SCA's are well opacified. The bilateral picas   are also well opacified. There is no aneurysm. No vascular malformation. This patient was seen by Dr. Yasmany Decker and surgery was discussed by him       Options have been discussed with the patient including continued medical management and operative intervention. Patient has opted to proceed with operative intervention. Risks have been discussed with the patient including but not limited to MI, death, stroke, nerve injury, infection and bleed. Assessment    1. Bilateral carotid artery stenosis    2. Atherosclerosis of native artery of both lower extremities with intermittent claudication (HCC)          Plan    Start/Continue ASA EC 81 mg daily  Start/Continue antiplatelet med: Plavix - dose 75  Recommend no smoking  Proceed with left CE

## 2018-08-21 LAB — MRSA CULTURE ONLY: NORMAL

## 2018-08-22 LAB
EKG P AXIS: 45 DEGREES
EKG P-R INTERVAL: 206 MS
EKG Q-T INTERVAL: 404 MS
EKG QRS DURATION: 110 MS
EKG QTC CALCULATION (BAZETT): 409 MS
EKG T AXIS: 61 DEGREES

## 2018-08-28 ENCOUNTER — ANESTHESIA (OUTPATIENT)
Dept: OPERATING ROOM | Age: 78
DRG: 039 | End: 2018-08-28
Payer: MEDICARE

## 2018-08-28 ENCOUNTER — ANESTHESIA EVENT (OUTPATIENT)
Dept: OPERATING ROOM | Age: 78
DRG: 039 | End: 2018-08-28
Payer: MEDICARE

## 2018-08-28 ENCOUNTER — HOSPITAL ENCOUNTER (INPATIENT)
Age: 78
LOS: 1 days | Discharge: HOME | DRG: 039 | End: 2018-08-29
Attending: SURGERY | Admitting: SURGERY
Payer: MEDICARE

## 2018-08-28 ENCOUNTER — APPOINTMENT (OUTPATIENT)
Dept: INTERVENTIONAL RADIOLOGY/VASCULAR | Age: 78
DRG: 039 | End: 2018-08-28
Attending: SURGERY
Payer: MEDICARE

## 2018-08-28 VITALS
RESPIRATION RATE: 1 BRPM | TEMPERATURE: 97.2 F | DIASTOLIC BLOOD PRESSURE: 62 MMHG | OXYGEN SATURATION: 79 % | SYSTOLIC BLOOD PRESSURE: 116 MMHG

## 2018-08-28 DIAGNOSIS — I65.22 CAROTID STENOSIS, ASYMPTOMATIC, LEFT: Primary | ICD-10-CM

## 2018-08-28 LAB
ABO/RH: NORMAL
ANTIBODY SCREEN: NORMAL
POC ACT LR: 316 SEC

## 2018-08-28 PROCEDURE — 85347 COAGULATION TIME ACTIVATED: CPT

## 2018-08-28 PROCEDURE — 3600000005 HC SURGERY LEVEL 5 BASE: Performed by: SURGERY

## 2018-08-28 PROCEDURE — 1210000000 HC MED SURG R&B

## 2018-08-28 PROCEDURE — 94664 DEMO&/EVAL PT USE INHALER: CPT

## 2018-08-28 PROCEDURE — 6360000002 HC RX W HCPCS: Performed by: SURGERY

## 2018-08-28 PROCEDURE — 03CL0ZZ EXTIRPATION OF MATTER FROM LEFT INTERNAL CAROTID ARTERY, OPEN APPROACH: ICD-10-PCS | Performed by: SURGERY

## 2018-08-28 PROCEDURE — 86900 BLOOD TYPING SEROLOGIC ABO: CPT

## 2018-08-28 PROCEDURE — 2780000010 HC IMPLANT OTHER: Performed by: SURGERY

## 2018-08-28 PROCEDURE — 35301 RECHANNELING OF ARTERY: CPT | Performed by: SURGERY

## 2018-08-28 PROCEDURE — 88311 DECALCIFY TISSUE: CPT

## 2018-08-28 PROCEDURE — 88304 TISSUE EXAM BY PATHOLOGIST: CPT

## 2018-08-28 PROCEDURE — 3700000001 HC ADD 15 MINUTES (ANESTHESIA): Performed by: SURGERY

## 2018-08-28 PROCEDURE — 2500000003 HC RX 250 WO HCPCS: Performed by: NURSE ANESTHETIST, CERTIFIED REGISTERED

## 2018-08-28 PROCEDURE — 2580000003 HC RX 258: Performed by: SURGERY

## 2018-08-28 PROCEDURE — 6370000000 HC RX 637 (ALT 250 FOR IP): Performed by: NURSE PRACTITIONER

## 2018-08-28 PROCEDURE — C1768 GRAFT, VASCULAR: HCPCS | Performed by: SURGERY

## 2018-08-28 PROCEDURE — 03UL0KZ SUPPLEMENT LEFT INTERNAL CAROTID ARTERY WITH NONAUTOLOGOUS TISSUE SUBSTITUTE, OPEN APPROACH: ICD-10-PCS | Performed by: SURGERY

## 2018-08-28 PROCEDURE — 6360000002 HC RX W HCPCS

## 2018-08-28 PROCEDURE — 3700000000 HC ANESTHESIA ATTENDED CARE: Performed by: SURGERY

## 2018-08-28 PROCEDURE — 2580000003 HC RX 258: Performed by: NURSE ANESTHETIST, CERTIFIED REGISTERED

## 2018-08-28 PROCEDURE — 6370000000 HC RX 637 (ALT 250 FOR IP): Performed by: SURGERY

## 2018-08-28 PROCEDURE — 7100000001 HC PACU RECOVERY - ADDTL 15 MIN: Performed by: SURGERY

## 2018-08-28 PROCEDURE — 6360000002 HC RX W HCPCS: Performed by: NURSE ANESTHETIST, CERTIFIED REGISTERED

## 2018-08-28 PROCEDURE — 7100000000 HC PACU RECOVERY - FIRST 15 MIN: Performed by: SURGERY

## 2018-08-28 PROCEDURE — 86901 BLOOD TYPING SEROLOGIC RH(D): CPT

## 2018-08-28 PROCEDURE — 2709999900 HC NON-CHARGEABLE SUPPLY: Performed by: SURGERY

## 2018-08-28 PROCEDURE — C1729 CATH, DRAINAGE: HCPCS | Performed by: SURGERY

## 2018-08-28 PROCEDURE — 86850 RBC ANTIBODY SCREEN: CPT

## 2018-08-28 PROCEDURE — 6360000002 HC RX W HCPCS: Performed by: NURSE PRACTITIONER

## 2018-08-28 PROCEDURE — 3600000015 HC SURGERY LEVEL 5 ADDTL 15MIN: Performed by: SURGERY

## 2018-08-28 PROCEDURE — 2500000003 HC RX 250 WO HCPCS: Performed by: SURGERY

## 2018-08-28 DEVICE — PATCH BIOLOGIC VASC 1CM WX14CM L .55MM THICKNESSXENOSURE: Type: IMPLANTABLE DEVICE | Site: CAROTID | Status: FUNCTIONAL

## 2018-08-28 RX ORDER — CLONIDINE HYDROCHLORIDE 0.1 MG/1
0.1 TABLET ORAL
Status: DISCONTINUED | OUTPATIENT
Start: 2018-08-28 | End: 2018-08-29 | Stop reason: HOSPADM

## 2018-08-28 RX ORDER — CLOPIDOGREL BISULFATE 75 MG/1
75 TABLET ORAL DAILY
COMMUNITY
End: 2018-10-01 | Stop reason: ALTCHOICE

## 2018-08-28 RX ORDER — MEPERIDINE HYDROCHLORIDE 50 MG/ML
12.5 INJECTION INTRAMUSCULAR; INTRAVENOUS; SUBCUTANEOUS EVERY 5 MIN PRN
Status: DISCONTINUED | OUTPATIENT
Start: 2018-08-28 | End: 2018-08-28 | Stop reason: HOSPADM

## 2018-08-28 RX ORDER — PANTOPRAZOLE SODIUM 40 MG/1
40 TABLET, DELAYED RELEASE ORAL DAILY
Status: DISCONTINUED | OUTPATIENT
Start: 2018-08-29 | End: 2018-08-29 | Stop reason: HOSPADM

## 2018-08-28 RX ORDER — SODIUM CHLORIDE 0.9 % (FLUSH) 0.9 %
10 SYRINGE (ML) INJECTION EVERY 12 HOURS SCHEDULED
Status: DISCONTINUED | OUTPATIENT
Start: 2018-08-28 | End: 2018-08-28 | Stop reason: HOSPADM

## 2018-08-28 RX ORDER — SODIUM CHLORIDE, SODIUM LACTATE, POTASSIUM CHLORIDE, CALCIUM CHLORIDE 600; 310; 30; 20 MG/100ML; MG/100ML; MG/100ML; MG/100ML
INJECTION, SOLUTION INTRAVENOUS CONTINUOUS
Status: DISCONTINUED | OUTPATIENT
Start: 2018-08-28 | End: 2018-08-28

## 2018-08-28 RX ORDER — HYDROMORPHONE HCL IN 0.9% NACL 0.5 MG/ML
0.5 SYRINGE (ML) INTRAVENOUS
Status: DISCONTINUED | OUTPATIENT
Start: 2018-08-28 | End: 2018-08-29 | Stop reason: HOSPADM

## 2018-08-28 RX ORDER — LOSARTAN POTASSIUM 100 MG/1
100 TABLET ORAL DAILY
Status: DISCONTINUED | OUTPATIENT
Start: 2018-08-28 | End: 2018-08-29 | Stop reason: HOSPADM

## 2018-08-28 RX ORDER — HYDRALAZINE HYDROCHLORIDE 20 MG/ML
5 INJECTION INTRAMUSCULAR; INTRAVENOUS EVERY 10 MIN PRN
Status: DISCONTINUED | OUTPATIENT
Start: 2018-08-28 | End: 2018-08-28 | Stop reason: HOSPADM

## 2018-08-28 RX ORDER — MORPHINE SULFATE 1 MG/ML
2 INJECTION, SOLUTION EPIDURAL; INTRATHECAL; INTRAVENOUS EVERY 5 MIN PRN
Status: DISCONTINUED | OUTPATIENT
Start: 2018-08-28 | End: 2018-08-28

## 2018-08-28 RX ORDER — LIDOCAINE HYDROCHLORIDE 10 MG/ML
INJECTION, SOLUTION EPIDURAL; INFILTRATION; INTRACAUDAL; PERINEURAL PRN
Status: DISCONTINUED | OUTPATIENT
Start: 2018-08-28 | End: 2018-08-28 | Stop reason: HOSPADM

## 2018-08-28 RX ORDER — FENTANYL CITRATE 50 UG/ML
50 INJECTION, SOLUTION INTRAMUSCULAR; INTRAVENOUS
Status: DISCONTINUED | OUTPATIENT
Start: 2018-08-28 | End: 2018-08-28 | Stop reason: HOSPADM

## 2018-08-28 RX ORDER — PROTAMINE SULFATE 10 MG/ML
INJECTION, SOLUTION INTRAVENOUS PRN
Status: DISCONTINUED | OUTPATIENT
Start: 2018-08-28 | End: 2018-08-28 | Stop reason: SDUPTHER

## 2018-08-28 RX ORDER — HYDROCODONE BITARTRATE AND ACETAMINOPHEN 5; 325 MG/1; MG/1
2 TABLET ORAL EVERY 4 HOURS PRN
Status: DISCONTINUED | OUTPATIENT
Start: 2018-08-28 | End: 2018-08-29 | Stop reason: HOSPADM

## 2018-08-28 RX ORDER — ASPIRIN 81 MG/1
81 TABLET ORAL ONCE
Status: COMPLETED | OUTPATIENT
Start: 2018-08-28 | End: 2018-08-28

## 2018-08-28 RX ORDER — GLYCOPYRROLATE 1 MG/5 ML
SYRINGE (ML) INTRAVENOUS PRN
Status: DISCONTINUED | OUTPATIENT
Start: 2018-08-28 | End: 2018-08-28 | Stop reason: SDUPTHER

## 2018-08-28 RX ORDER — HYDROMORPHONE HCL IN 0.9% NACL 0.5 MG/ML
0.5 SYRINGE (ML) INTRAVENOUS EVERY 5 MIN PRN
Status: DISCONTINUED | OUTPATIENT
Start: 2018-08-28 | End: 2018-08-28 | Stop reason: HOSPADM

## 2018-08-28 RX ORDER — LIDOCAINE HYDROCHLORIDE 10 MG/ML
INJECTION, SOLUTION EPIDURAL; INFILTRATION; INTRACAUDAL; PERINEURAL PRN
Status: DISCONTINUED | OUTPATIENT
Start: 2018-08-28 | End: 2018-08-28 | Stop reason: SDUPTHER

## 2018-08-28 RX ORDER — ROCURONIUM BROMIDE 10 MG/ML
INJECTION, SOLUTION INTRAVENOUS PRN
Status: DISCONTINUED | OUTPATIENT
Start: 2018-08-28 | End: 2018-08-28 | Stop reason: SDUPTHER

## 2018-08-28 RX ORDER — SODIUM CHLORIDE 0.9 % (FLUSH) 0.9 %
10 SYRINGE (ML) INJECTION PRN
Status: DISCONTINUED | OUTPATIENT
Start: 2018-08-28 | End: 2018-08-28 | Stop reason: HOSPADM

## 2018-08-28 RX ORDER — METOPROLOL SUCCINATE 50 MG/1
100 TABLET, EXTENDED RELEASE ORAL DAILY
Status: DISCONTINUED | OUTPATIENT
Start: 2018-08-29 | End: 2018-08-29 | Stop reason: HOSPADM

## 2018-08-28 RX ORDER — DEXAMETHASONE SODIUM PHOSPHATE 10 MG/ML
INJECTION INTRAMUSCULAR; INTRAVENOUS PRN
Status: DISCONTINUED | OUTPATIENT
Start: 2018-08-28 | End: 2018-08-28 | Stop reason: SDUPTHER

## 2018-08-28 RX ORDER — PROPOFOL 10 MG/ML
INJECTION, EMULSION INTRAVENOUS PRN
Status: DISCONTINUED | OUTPATIENT
Start: 2018-08-28 | End: 2018-08-28 | Stop reason: SDUPTHER

## 2018-08-28 RX ORDER — SCOLOPAMINE TRANSDERMAL SYSTEM 1 MG/1
1 PATCH, EXTENDED RELEASE TRANSDERMAL ONCE
Status: DISCONTINUED | OUTPATIENT
Start: 2018-08-28 | End: 2018-08-28 | Stop reason: HOSPADM

## 2018-08-28 RX ORDER — MORPHINE SULFATE 4 MG/ML
4 INJECTION, SOLUTION INTRAMUSCULAR; INTRAVENOUS
Status: DISCONTINUED | OUTPATIENT
Start: 2018-08-28 | End: 2018-08-28 | Stop reason: HOSPADM

## 2018-08-28 RX ORDER — HYDROMORPHONE HCL IN 0.9% NACL 0.5 MG/ML
0.25 SYRINGE (ML) INTRAVENOUS EVERY 5 MIN PRN
Status: DISCONTINUED | OUTPATIENT
Start: 2018-08-28 | End: 2018-08-28 | Stop reason: HOSPADM

## 2018-08-28 RX ORDER — EPHEDRINE SULFATE 50 MG/ML
INJECTION, SOLUTION INTRAVENOUS PRN
Status: DISCONTINUED | OUTPATIENT
Start: 2018-08-28 | End: 2018-08-28 | Stop reason: SDUPTHER

## 2018-08-28 RX ORDER — LOSARTAN POTASSIUM AND HYDROCHLOROTHIAZIDE 25; 100 MG/1; MG/1
1 TABLET ORAL DAILY
Status: DISCONTINUED | OUTPATIENT
Start: 2018-08-28 | End: 2018-08-28

## 2018-08-28 RX ORDER — MIDAZOLAM HYDROCHLORIDE 1 MG/ML
INJECTION INTRAMUSCULAR; INTRAVENOUS
Status: COMPLETED
Start: 2018-08-28 | End: 2018-08-28

## 2018-08-28 RX ORDER — HEPARIN SODIUM 1000 [USP'U]/ML
INJECTION, SOLUTION INTRAVENOUS; SUBCUTANEOUS PRN
Status: DISCONTINUED | OUTPATIENT
Start: 2018-08-28 | End: 2018-08-28 | Stop reason: SDUPTHER

## 2018-08-28 RX ORDER — CLOPIDOGREL BISULFATE 75 MG/1
75 TABLET ORAL DAILY
Status: DISCONTINUED | OUTPATIENT
Start: 2018-08-29 | End: 2018-08-29 | Stop reason: HOSPADM

## 2018-08-28 RX ORDER — DIPHENHYDRAMINE HYDROCHLORIDE 50 MG/ML
12.5 INJECTION INTRAMUSCULAR; INTRAVENOUS
Status: DISCONTINUED | OUTPATIENT
Start: 2018-08-28 | End: 2018-08-28 | Stop reason: HOSPADM

## 2018-08-28 RX ORDER — NIFEDIPINE 60 MG/1
60 TABLET, EXTENDED RELEASE ORAL DAILY
Status: DISCONTINUED | OUTPATIENT
Start: 2018-08-29 | End: 2018-08-29 | Stop reason: HOSPADM

## 2018-08-28 RX ORDER — SODIUM CHLORIDE 0.9 % (FLUSH) 0.9 %
10 SYRINGE (ML) INJECTION PRN
Status: DISCONTINUED | OUTPATIENT
Start: 2018-08-28 | End: 2018-08-29 | Stop reason: HOSPADM

## 2018-08-28 RX ORDER — HYDROCHLOROTHIAZIDE 25 MG/1
25 TABLET ORAL DAILY
Status: DISCONTINUED | OUTPATIENT
Start: 2018-08-28 | End: 2018-08-29 | Stop reason: HOSPADM

## 2018-08-28 RX ORDER — MIDAZOLAM HYDROCHLORIDE 1 MG/ML
2 INJECTION INTRAMUSCULAR; INTRAVENOUS
Status: COMPLETED | OUTPATIENT
Start: 2018-08-28 | End: 2018-08-28

## 2018-08-28 RX ORDER — HYDROMORPHONE HCL IN 0.9% NACL 0.5 MG/ML
0.25 SYRINGE (ML) INTRAVENOUS
Status: DISCONTINUED | OUTPATIENT
Start: 2018-08-28 | End: 2018-08-29 | Stop reason: HOSPADM

## 2018-08-28 RX ORDER — ATORVASTATIN CALCIUM 20 MG/1
20 TABLET, FILM COATED ORAL DAILY
Status: DISCONTINUED | OUTPATIENT
Start: 2018-08-28 | End: 2018-08-29 | Stop reason: HOSPADM

## 2018-08-28 RX ORDER — HYDROCODONE BITARTRATE AND ACETAMINOPHEN 5; 325 MG/1; MG/1
1 TABLET ORAL EVERY 4 HOURS PRN
Status: DISCONTINUED | OUTPATIENT
Start: 2018-08-28 | End: 2018-08-29 | Stop reason: HOSPADM

## 2018-08-28 RX ORDER — ACETAMINOPHEN 325 MG/1
650 TABLET ORAL EVERY 4 HOURS PRN
Status: DISCONTINUED | OUTPATIENT
Start: 2018-08-28 | End: 2018-08-29 | Stop reason: HOSPADM

## 2018-08-28 RX ORDER — ONDANSETRON 2 MG/ML
INJECTION INTRAMUSCULAR; INTRAVENOUS PRN
Status: DISCONTINUED | OUTPATIENT
Start: 2018-08-28 | End: 2018-08-28 | Stop reason: SDUPTHER

## 2018-08-28 RX ORDER — MORPHINE SULFATE 1 MG/ML
4 INJECTION, SOLUTION EPIDURAL; INTRATHECAL; INTRAVENOUS EVERY 5 MIN PRN
Status: DISCONTINUED | OUTPATIENT
Start: 2018-08-28 | End: 2018-08-28 | Stop reason: HOSPADM

## 2018-08-28 RX ORDER — LABETALOL HYDROCHLORIDE 5 MG/ML
5 INJECTION, SOLUTION INTRAVENOUS EVERY 10 MIN PRN
Status: DISCONTINUED | OUTPATIENT
Start: 2018-08-28 | End: 2018-08-28 | Stop reason: HOSPADM

## 2018-08-28 RX ORDER — LIDOCAINE HYDROCHLORIDE 10 MG/ML
1 INJECTION, SOLUTION EPIDURAL; INFILTRATION; INTRACAUDAL; PERINEURAL
Status: DISCONTINUED | OUTPATIENT
Start: 2018-08-28 | End: 2018-08-28 | Stop reason: HOSPADM

## 2018-08-28 RX ORDER — CEFAZOLIN SODIUM 1 G/50ML
1 INJECTION, SOLUTION INTRAVENOUS EVERY 8 HOURS
Status: COMPLETED | OUTPATIENT
Start: 2018-08-28 | End: 2018-08-28

## 2018-08-28 RX ORDER — SODIUM CHLORIDE 9 MG/ML
INJECTION, SOLUTION INTRAVENOUS CONTINUOUS
Status: ACTIVE | OUTPATIENT
Start: 2018-08-28 | End: 2018-08-28

## 2018-08-28 RX ORDER — METOCLOPRAMIDE HYDROCHLORIDE 5 MG/ML
10 INJECTION INTRAMUSCULAR; INTRAVENOUS
Status: DISCONTINUED | OUTPATIENT
Start: 2018-08-28 | End: 2018-08-28 | Stop reason: HOSPADM

## 2018-08-28 RX ORDER — MORPHINE SULFATE 1 MG/ML
4 INJECTION, SOLUTION EPIDURAL; INTRATHECAL; INTRAVENOUS EVERY 5 MIN PRN
Status: DISCONTINUED | OUTPATIENT
Start: 2018-08-28 | End: 2018-08-28

## 2018-08-28 RX ORDER — SODIUM CHLORIDE 0.9 % (FLUSH) 0.9 %
10 SYRINGE (ML) INJECTION EVERY 12 HOURS SCHEDULED
Status: DISCONTINUED | OUTPATIENT
Start: 2018-08-28 | End: 2018-08-29 | Stop reason: HOSPADM

## 2018-08-28 RX ORDER — FENTANYL CITRATE 50 UG/ML
INJECTION, SOLUTION INTRAMUSCULAR; INTRAVENOUS PRN
Status: DISCONTINUED | OUTPATIENT
Start: 2018-08-28 | End: 2018-08-28 | Stop reason: SDUPTHER

## 2018-08-28 RX ORDER — ONDANSETRON 2 MG/ML
4 INJECTION INTRAMUSCULAR; INTRAVENOUS EVERY 6 HOURS PRN
Status: DISCONTINUED | OUTPATIENT
Start: 2018-08-28 | End: 2018-08-29 | Stop reason: HOSPADM

## 2018-08-28 RX ORDER — MORPHINE SULFATE 1 MG/ML
2 INJECTION, SOLUTION EPIDURAL; INTRATHECAL; INTRAVENOUS EVERY 5 MIN PRN
Status: DISCONTINUED | OUTPATIENT
Start: 2018-08-28 | End: 2018-08-28 | Stop reason: HOSPADM

## 2018-08-28 RX ADMIN — CEFAZOLIN SODIUM 1 G: 1 INJECTION, SOLUTION INTRAVENOUS at 14:07

## 2018-08-28 RX ADMIN — PROPOFOL 130 MG: 10 INJECTION, EMULSION INTRAVENOUS at 07:17

## 2018-08-28 RX ADMIN — CLONIDINE HYDROCHLORIDE 0.1 MG: 0.1 TABLET ORAL at 14:07

## 2018-08-28 RX ADMIN — FENTANYL CITRATE 25 MCG: 50 INJECTION INTRAMUSCULAR; INTRAVENOUS at 08:55

## 2018-08-28 RX ADMIN — FENTANYL CITRATE 50 MCG: 50 INJECTION INTRAMUSCULAR; INTRAVENOUS at 07:40

## 2018-08-28 RX ADMIN — PROTAMINE SULFATE 40 MG: 10 INJECTION, SOLUTION INTRAVENOUS at 09:02

## 2018-08-28 RX ADMIN — ROCURONIUM BROMIDE 50 MG: 10 INJECTION INTRAVENOUS at 07:17

## 2018-08-28 RX ADMIN — HYDROMORPHONE HYDROCHLORIDE 0.25 MG: 1 INJECTION, SOLUTION INTRAMUSCULAR; INTRAVENOUS; SUBCUTANEOUS at 09:27

## 2018-08-28 RX ADMIN — Medication 0.2 MG: at 08:00

## 2018-08-28 RX ADMIN — EPHEDRINE SULFATE 5 MG: 50 INJECTION, SOLUTION INTRAMUSCULAR; INTRAVENOUS; SUBCUTANEOUS at 07:25

## 2018-08-28 RX ADMIN — Medication 0.2 MG: at 08:05

## 2018-08-28 RX ADMIN — CEFAZOLIN SODIUM 1 G: 1 INJECTION, SOLUTION INTRAVENOUS at 22:02

## 2018-08-28 RX ADMIN — Medication 2 G: at 07:25

## 2018-08-28 RX ADMIN — Medication 10 ML: at 21:14

## 2018-08-28 RX ADMIN — HYDROMORPHONE HYDROCHLORIDE 0.25 MG: 1 INJECTION, SOLUTION INTRAMUSCULAR; INTRAVENOUS; SUBCUTANEOUS at 09:15

## 2018-08-28 RX ADMIN — HEPARIN SODIUM 6000 UNITS: 1000 INJECTION, SOLUTION INTRAVENOUS; SUBCUTANEOUS at 08:00

## 2018-08-28 RX ADMIN — SUGAMMADEX 200 MG: 100 INJECTION, SOLUTION INTRAVENOUS at 09:15

## 2018-08-28 RX ADMIN — DEXAMETHASONE SODIUM PHOSPHATE 10 MG: 10 INJECTION INTRAMUSCULAR; INTRAVENOUS at 07:23

## 2018-08-28 RX ADMIN — EPHEDRINE SULFATE 10 MG: 50 INJECTION, SOLUTION INTRAMUSCULAR; INTRAVENOUS; SUBCUTANEOUS at 07:28

## 2018-08-28 RX ADMIN — ASPIRIN 81 MG: 81 TABLET, COATED ORAL at 06:16

## 2018-08-28 RX ADMIN — ONDANSETRON HYDROCHLORIDE 4 MG: 2 INJECTION, SOLUTION INTRAMUSCULAR; INTRAVENOUS at 09:15

## 2018-08-28 RX ADMIN — PHENYLEPHRINE HYDROCHLORIDE 5 MCG/MIN: 10 INJECTION INTRAVENOUS at 07:37

## 2018-08-28 RX ADMIN — LIDOCAINE HYDROCHLORIDE 5 ML: 10 INJECTION, SOLUTION EPIDURAL; INFILTRATION; INTRACAUDAL; PERINEURAL at 07:17

## 2018-08-28 RX ADMIN — SODIUM CHLORIDE, SODIUM LACTATE, POTASSIUM CHLORIDE, AND CALCIUM CHLORIDE: 600; 310; 30; 20 INJECTION, SOLUTION INTRAVENOUS at 08:15

## 2018-08-28 RX ADMIN — MIDAZOLAM HYDROCHLORIDE 2 MG: 1 INJECTION INTRAMUSCULAR; INTRAVENOUS at 07:04

## 2018-08-28 RX ADMIN — SODIUM CHLORIDE: 9 INJECTION, SOLUTION INTRAVENOUS at 11:39

## 2018-08-28 RX ADMIN — ACETAMINOPHEN 650 MG: 325 TABLET ORAL at 21:13

## 2018-08-28 RX ADMIN — ACETAMINOPHEN 650 MG: 325 TABLET ORAL at 15:16

## 2018-08-28 RX ADMIN — FENTANYL CITRATE 100 MCG: 50 INJECTION INTRAMUSCULAR; INTRAVENOUS at 07:17

## 2018-08-28 RX ADMIN — SODIUM CHLORIDE, SODIUM LACTATE, POTASSIUM CHLORIDE, AND CALCIUM CHLORIDE: 600; 310; 30; 20 INJECTION, SOLUTION INTRAVENOUS at 06:16

## 2018-08-28 RX ADMIN — MIDAZOLAM HYDROCHLORIDE 2 MG: 2 INJECTION, SOLUTION INTRAMUSCULAR; INTRAVENOUS at 07:04

## 2018-08-28 ASSESSMENT — PAIN DESCRIPTION - DESCRIPTORS: DESCRIPTORS: BURNING

## 2018-08-28 ASSESSMENT — PAIN SCALES - GENERAL
PAINLEVEL_OUTOF10: 2
PAINLEVEL_OUTOF10: 0
PAINLEVEL_OUTOF10: 4
PAINLEVEL_OUTOF10: 1
PAINLEVEL_OUTOF10: 5

## 2018-08-28 ASSESSMENT — ENCOUNTER SYMPTOMS: SHORTNESS OF BREATH: 0

## 2018-08-28 ASSESSMENT — LIFESTYLE VARIABLES: SMOKING_STATUS: 0

## 2018-08-28 NOTE — PLAN OF CARE
Problem: Pain:  Goal: Pain level will decrease  Pain level will decrease   Outcome: Ongoing    Goal: Control of acute pain  Control of acute pain   Outcome: Ongoing    Goal: Control of chronic pain  Control of chronic pain   Outcome: Ongoing      Problem: Skin Integrity:  Goal: Will show no infection signs and symptoms  Will show no infection signs and symptoms  Outcome: Ongoing    Goal: Absence of new skin breakdown  Absence of new skin breakdown  Outcome: Ongoing      Problem: Falls - Risk of:  Goal: Will remain free from falls  Will remain free from falls  Outcome: Ongoing    Goal: Absence of physical injury  Absence of physical injury  Outcome: Ongoing

## 2018-08-28 NOTE — PROGRESS NOTES
Patient is post op LCE with vein patch angioplasty and completion angiogram per  earlier today. Left neck incision with Dermabond intact, small area of bloody drainage in center . RACHEL X1 to bulb suction with moderate amount of bloody drainage noted. Patient alert and oriented, moving all extremities without difficulty, speech clear.

## 2018-08-28 NOTE — PROGRESS NOTES
4 Eyes Skin Assessment    Chanel Mccrary is being assessed upon: Admission    I agree that I, Arturo Carballo, along with Farzad Jarrett either 2 RN's or 1 LPN and 1 RN) have performed a thorough Head to Toe Skin Assessment on the patient. ALL assessment sites listed below have been assessed. Areas assessed by both nurses:     [x]   Head, Face, and Ears   [x]   Shoulders, Back, and Chest  [x]   Arms, Elbows, and Hands   [x]   Coccyx, Sacrum, and Ischium  [x]   Legs, Feet, and Heels    Does the Patient have Skin Breakdown?  No    Ángel Prevention initiated: Yes  Wound Care Orders initiated: No    [facility] nurse consulted for Pressure Injury (Stage 3,4, Unstageable, DTI, NWPT, and Complex wounds) and New or Established Ostomies: No        Primary Nurse eSignature: Arturo Carballo RN on 8/28/2018 at 11:20 AM      Co-Signer eSignature: Electronically signed by Ankit Ernandez RN on 8/28/18 at 12:34 PM

## 2018-08-28 NOTE — H&P (VIEW-ONLY)
cough, or chest tightness associated with shortness of breath. Cardiovascular  no chest pain, syncope, or significant dizziness. No palpitations or significant leg swelling. No claudication. Gastrointestinal  no abdominal swelling or pain. No blood in stool. No severe constipation, diarrhea, nausea, or vomiting. Genitourinary  No difficulty urinating, dysuria, frequency, or urgency. No flank pain or hematuria. Musculoskeletal  no back pain, gait disturbance, or myalgia. Skin  no color change, rash, pallor, or new wound. Neurologic  no dizziness, facial asymmetry, or light headedness. No seizures. No speech difficulty or lateralizing weakness. Hematologic  no easy bruising or excessive bleeding. Psychiatric  no severe anxiety or nervousness. No confusion. All other review of systems are negative. Physical Exam    BP (!) 149/75   Pulse 62   Temp 97.3 °F (36.3 °C) (Temporal)   SpO2 96%     Constitutional  well developed, well nourished. No diaphoresis or acute distress. HENT  head normocephalic. Right external ear canal appears normal.  Left external ear canal appears normal.  Septum appears midline. Eyes  conjunctiva normal.  EOMS normal.  No exudate. No icterus. Neck- ROM appears normal, no tracheal deviation. Cardiovascular  Regular rate and rhythm. Heart sounds are normal.  No murmur, rub, or gallop. Carotid pulses are 2+ to palpation bilaterally without bruit. Extremities - Radial and brachial pulses are 2+ to palpation bilaterally. Right femoral pulse: present 2+; Right popliteal pulse: absent Right DP: absent; Right PT absent; Left femoral pulse: present 2+; Left popliteal pulse: present 1+; Left DP: absent; Left PT: absent  No cyanosis, clubbing, or significant edema. No signs atheroembolic event. Pulmonary  effort appears normal.  No respiratory distress. Lungs - Breath sounds normal. No wheezes or rales.     GI - Abdomen  soft, non tender, bowel sounds X Atherosclerosis of native artery of both lower extremities with intermittent claudication (HCC)          Plan    Start/Continue ASA EC 81 mg daily  Start/Continue antiplatelet med: Plavix - dose 75  Recommend no smoking  Proceed with left CE

## 2018-08-28 NOTE — ANESTHESIA PRE PROCEDURE
 Alcohol use 7.2 oz/week     12 Glasses of wine per week      Comment: 2 glasses of wine every night                                Counseling given: Not Answered      Vital Signs (Current):   Vitals:    08/28/18 0557   BP: (!) 165/69   Pulse: 66   Resp: 16   Temp: 99.1 °F (37.3 °C)   TempSrc: Temporal   SpO2: 96%                                              BP Readings from Last 3 Encounters:   08/28/18 (!) 165/69   08/20/18 (!) 149/75   07/18/18 (!) 152/83       NPO Status: Time of last liquid consumption: 2300                        Time of last solid consumption: 2300                        Date of last liquid consumption: 08/27/18                        Date of last solid food consumption: 08/27/18    BMI:   Wt Readings from Last 3 Encounters:   08/20/18 233 lb (105.7 kg)   07/18/18 233 lb (105.7 kg)   07/10/18 233 lb (105.7 kg)     There is no height or weight on file to calculate BMI.    CBC:   Lab Results   Component Value Date    WBC 8.3 08/20/2018    RBC 5.86 08/20/2018    HGB 17.1 08/20/2018    HCT 51.7 08/20/2018    MCV 88.2 08/20/2018    RDW 12.0 08/20/2018     08/20/2018       CMP:   Lab Results   Component Value Date     08/20/2018    K 3.8 08/20/2018    CL 96 08/20/2018    CO2 23 08/20/2018    BUN 26 08/20/2018    CREATININE 1.4 08/20/2018    LABGLOM 49 08/20/2018    GLUCOSE 148 08/20/2018    PROT 5.7 08/02/2017    CALCIUM 9.8 08/20/2018    BILITOT 0.3 08/02/2017    ALKPHOS 79 08/02/2017    AST 15 08/02/2017    ALT 16 08/02/2017       POC Tests: No results for input(s): POCGLU, POCNA, POCK, POCCL, POCBUN, POCHEMO, POCHCT in the last 72 hours.     Coags:   Lab Results   Component Value Date    PROTIME 13.5 08/20/2018    INR 1.04 08/20/2018    APTT 31.5 08/20/2018       HCG (If Applicable): No results found for: PREGTESTUR, PREGSERUM, HCG, HCGQUANT     ABGs: No results found for: PHART, PO2ART, TYH6VHT, UWG1CRM, BEART, L3JKNDHB     Type & Screen (If Applicable):  No results found for: LABABO, [address]    Anesthesia Evaluation  Patient summary reviewed and Nursing notes reviewed no history of anesthetic complications:   Airway: Mallampati: II  TM distance: >3 FB   Neck ROM: full  Mouth opening: > = 3 FB Dental: normal exam         Pulmonary:Negative Pulmonary ROS and normal exam  breath sounds clear to auscultation      (-) shortness of breath and not a current smoker          Patient did not smoke on day of surgery. Cardiovascular:    (+) hypertension:, CAD:, CABG/stent:,     (-)  angina and  CHF    NYHA Classification: I  ECG reviewed  Rhythm: regular  Rate: normal           Beta Blocker:  Not on Beta Blocker         Neuro/Psych:   Negative Neuro/Psych ROS     (-) seizures, CVA and depression/anxiety            GI/Hepatic/Renal: Neg GI/Hepatic/Renal ROS  (+) GERD: well controlled, renal disease: CRI,      (-) hiatal hernia       Endo/Other: Negative Endo/Other ROS             Pt had PAT visit. Abdominal:   (+) obese,     Abdomen: soft. Vascular:   + PVD, aortic or cerebral, . Anesthesia Plan      general     ASA 3     (Iv zofran within 30 min of closing )  Induction: intravenous. arterial line  MIPS: Postoperative opioids intended and Prophylactic antiemetics administered. Anesthetic plan and risks discussed with patient. Use of blood products discussed with patient whom. Plan discussed with CRNA.     Attending anesthesiologist reviewed and agrees with Pre Eval content              Tiffanie Alcazar MD   8/28/2018

## 2018-08-29 VITALS
RESPIRATION RATE: 20 BRPM | TEMPERATURE: 98.5 F | HEART RATE: 63 BPM | DIASTOLIC BLOOD PRESSURE: 73 MMHG | SYSTOLIC BLOOD PRESSURE: 138 MMHG | OXYGEN SATURATION: 96 %

## 2018-08-29 LAB
ANION GAP SERPL CALCULATED.3IONS-SCNC: 14 MMOL/L (ref 7–19)
BASOPHILS ABSOLUTE: 0 K/UL (ref 0–0.2)
BASOPHILS RELATIVE PERCENT: 0.2 % (ref 0–1)
BUN BLDV-MCNC: 30 MG/DL (ref 8–23)
CALCIUM SERPL-MCNC: 8.6 MG/DL (ref 8.8–10.2)
CHLORIDE BLD-SCNC: 96 MMOL/L (ref 98–111)
CO2: 26 MMOL/L (ref 22–29)
CREAT SERPL-MCNC: 1.6 MG/DL (ref 0.5–1.2)
EOSINOPHILS ABSOLUTE: 0 K/UL (ref 0–0.6)
EOSINOPHILS RELATIVE PERCENT: 0.1 % (ref 0–5)
GFR NON-AFRICAN AMERICAN: 42
GLUCOSE BLD-MCNC: 174 MG/DL (ref 74–109)
HCT VFR BLD CALC: 40 % (ref 42–52)
HEMOGLOBIN: 13.1 G/DL (ref 14–18)
LYMPHOCYTES ABSOLUTE: 1.1 K/UL (ref 1.1–4.5)
LYMPHOCYTES RELATIVE PERCENT: 9.2 % (ref 20–40)
MCH RBC QN AUTO: 29.5 PG (ref 27–31)
MCHC RBC AUTO-ENTMCNC: 32.8 G/DL (ref 33–37)
MCV RBC AUTO: 90.1 FL (ref 80–94)
MONOCYTES ABSOLUTE: 0.9 K/UL (ref 0–0.9)
MONOCYTES RELATIVE PERCENT: 7.4 % (ref 0–10)
NEUTROPHILS ABSOLUTE: 9.7 K/UL (ref 1.5–7.5)
NEUTROPHILS RELATIVE PERCENT: 82.3 % (ref 50–65)
PDW BLD-RTO: 11.9 % (ref 11.5–14.5)
PLATELET # BLD: 163 K/UL (ref 130–400)
PMV BLD AUTO: 12.2 FL (ref 9.4–12.4)
POTASSIUM REFLEX MAGNESIUM: 4.1 MMOL/L (ref 3.5–5)
RBC # BLD: 4.44 M/UL (ref 4.7–6.1)
SODIUM BLD-SCNC: 136 MMOL/L (ref 136–145)
WBC # BLD: 11.8 K/UL (ref 4.8–10.8)

## 2018-08-29 PROCEDURE — 99024 POSTOP FOLLOW-UP VISIT: CPT | Performed by: SURGERY

## 2018-08-29 PROCEDURE — 6370000000 HC RX 637 (ALT 250 FOR IP): Performed by: SURGERY

## 2018-08-29 PROCEDURE — 36415 COLL VENOUS BLD VENIPUNCTURE: CPT

## 2018-08-29 PROCEDURE — 80048 BASIC METABOLIC PNL TOTAL CA: CPT

## 2018-08-29 PROCEDURE — 85025 COMPLETE CBC W/AUTO DIFF WBC: CPT

## 2018-08-29 PROCEDURE — 2580000003 HC RX 258: Performed by: SURGERY

## 2018-08-29 RX ORDER — HYDROCODONE BITARTRATE AND ACETAMINOPHEN 5; 325 MG/1; MG/1
1 TABLET ORAL EVERY 8 HOURS PRN
Qty: 20 TABLET | Refills: 0 | Status: SHIPPED | OUTPATIENT
Start: 2018-08-29 | End: 2018-09-05

## 2018-08-29 RX ORDER — POLYETHYLENE GLYCOL 3350 17 G/17G
17 POWDER, FOR SOLUTION ORAL DAILY
Status: DISCONTINUED | OUTPATIENT
Start: 2018-08-29 | End: 2018-08-29 | Stop reason: HOSPADM

## 2018-08-29 RX ADMIN — HYDROCHLOROTHIAZIDE 25 MG: 25 TABLET ORAL at 07:52

## 2018-08-29 RX ADMIN — Medication 10 ML: at 07:53

## 2018-08-29 RX ADMIN — CLOPIDOGREL BISULFATE 75 MG: 75 TABLET ORAL at 07:52

## 2018-08-29 RX ADMIN — LOSARTAN POTASSIUM 100 MG: 100 TABLET ORAL at 07:52

## 2018-08-29 RX ADMIN — PANTOPRAZOLE SODIUM 40 MG: 40 TABLET, DELAYED RELEASE ORAL at 07:52

## 2018-08-29 RX ADMIN — POLYETHYLENE GLYCOL 3350 17 G: 17 POWDER, FOR SOLUTION ORAL at 07:53

## 2018-08-29 RX ADMIN — NIFEDIPINE 60 MG: 60 TABLET, FILM COATED, EXTENDED RELEASE ORAL at 07:53

## 2018-08-29 RX ADMIN — ATORVASTATIN CALCIUM 20 MG: 20 TABLET, FILM COATED ORAL at 07:52

## 2018-08-29 RX ADMIN — METOPROLOL SUCCINATE 100 MG: 50 TABLET, EXTENDED RELEASE ORAL at 07:52

## 2018-08-29 ASSESSMENT — PAIN SCALES - GENERAL
PAINLEVEL_OUTOF10: 0

## 2018-08-29 NOTE — PROGRESS NOTES
Vascular Surgery  Dr.Scott James Goel   Daily Progress Note    Pt Name: [id number] Husain Record Number: [id number]  Date of Birth 1940   Today's Date: 8/29/2018        SUBJECTIVE:     Patient was seen and examined.   Pain is controlled, states he has had tylenol X 2 post op for headache  has not had nausea/vomiting  No complaints     OBJECTIVE:     Patient Vitals for the past 24 hrs:   BP Temp Temp src Pulse Resp SpO2   08/29/18 0438 119/62 98.6 °F (37 °C) Temporal 70 16 95 %   08/29/18 0129 122/70 98.6 °F (37 °C) Temporal 64 14 93 %   08/28/18 2326 125/73 97.7 °F (36.5 °C) Temporal 68 16 93 %   08/28/18 2110 (!) 151/83 97.9 °F (36.6 °C) Temporal 75 16 94 %   08/28/18 1918 132/80 98.5 °F (36.9 °C) Temporal 77 18 94 %   08/28/18 1730 137/72 97.6 °F (36.4 °C) Temporal 69 16 97 %   08/28/18 1345 (!) 161/80 - - - - -   08/28/18 1340 (!) 180/101 97.4 °F (36.3 °C) Temporal 75 18 95 %   08/28/18 1207 (!) 154/79 97.3 °F (36.3 °C) Temporal 68 18 91 %   08/28/18 1116 (!) 151/86 97.8 °F (36.6 °C) Temporal 71 18 95 %   08/28/18 1105 - - - 67 14 96 %   08/28/18 1100 (!) 147/68 98.2 °F (36.8 °C) Temporal 68 15 95 %   08/28/18 1050 - - - 67 15 96 %   08/28/18 1045 (!) 151/67 - - 67 12 94 %   08/28/18 1040 (!) 152/65 - - 66 10 95 %   08/28/18 1035 (!) 152/66 - - 67 13 94 %   08/28/18 1030 (!) 155/66 - - 69 13 95 %   08/28/18 1025 (!) 153/65 - - 66 12 96 %   08/28/18 1020 (!) 155/66 - - 68 16 96 %   08/28/18 1015 - - - 66 - -   08/28/18 1010 (!) 149/68 - - 67 12 95 %   08/28/18 1005 (!) 146/69 - - 67 11 95 %   08/28/18 1000 (!) 145/66 - - 67 13 95 %   08/28/18 0955 (!) 148/65 - - 68 14 95 %   08/28/18 0950 (!) 152/70 - - 70 14 96 %   08/28/18 0945 (!) 151/68 - - 70 11 95 %   08/28/18 0940 (!) 153/69 98.2 °F (36.8 °C) Temporal 73 14 96 %         Intake/Output Summary (Last 24 hours) at 08/29/18 0709  Last data filed at 08/29/18 6527   Gross per 24 hour   Intake             1695 ml   Output             1535 ml   Net 160 ml       In: 385 [I.V.:385]  Out: 920 [Urine:850; Drains:40]    I/O last 3 completed shifts: In: 5464 [P.O.:210; I.V.:1485]  Out: 8244 [Urine:1200; Drains:75; Blood:260]       Date 08/29/18 0000 - 08/29/18 2359   Shift 5339-4108 2352-6838 4775-7536 24 Hour Total   I  N  T  A  K  E   Shift Total       O  U  T  P  U  T   Urine 225   225    Drains 40   40    Shift Total 265   265   Weight (kg)           Wt Readings from Last 3 Encounters:   08/20/18 233 lb (105.7 kg)   07/18/18 233 lb (105.7 kg)   07/10/18 233 lb (105.7 kg)        There is no height or weight on file to calculate BMI. Diet: DIET LOW FAT;        MEDS:     Scheduled Meds:   atorvastatin  20 mg Oral Daily    clopidogrel  75 mg Oral Daily    metoprolol succinate  100 mg Oral Daily    NIFEdipine  60 mg Oral Daily    pantoprazole  40 mg Oral Daily    sodium chloride flush  10 mL Intravenous 2 times per day    losartan  100 mg Oral Daily    And    hydrochlorothiazide  25 mg Oral Daily     Continuous Infusions:  PRN Meds:  sodium chloride flush 10 mL PRN   acetaminophen 650 mg Q4H PRN   HYDROcodone 5 mg - acetaminophen 1 tablet Q4H PRN   Or     HYDROcodone 5 mg - acetaminophen 2 tablet Q4H PRN   ondansetron 4 mg Q6H PRN   HYDROmorphone 0.25 mg Q3H PRN   Or     HYDROmorphone 0.5 mg Q3H PRN   metoprolol tartrate 25 mg Q12H PRN   cloNIDine 0.1 mg Q2H PRN         PHYSICAL EXAM:     CONSTITUTIONAL: Alert and oriented times 3, no acute distress and cooperative to examination. HEENT: Normal  NECK: Soft, trachea midline and straight  LUNGS: Chest expands equally bilaterally upon respiration, no accessory muscle used. Ausculation reveals no wheezes, rales or rhonchi. CARDIOVASCULAR: Heart regular rate and rhythm  ABDOMEN: soft, nontender, nondistended  NEUROLOGIC: CN II-XII are grossly intact. There are no focalizing motor or sensory deficits  WOUND/INCISION:  Left neck incision line YSABEL with Dermabond, some dried blood under glue.  Mild edema, mild bruising to left neck. JPX1 with 40ml of serosang drainage emptied since yesterday at 2pm  EXTREMITY: Feet and hands warm to touch/pink color    LABS:     CBC: Recent Labs      08/29/18   0354   WBC  11.8*   RBC  4.44*   HGB  13.1*   HCT  40.0*   MCV  90.1   MCH  29.5   MCHC  32.8*   RDW  11.9   PLT  163   MPV  12.2      Last 3 CMP:   Recent Labs      08/29/18   0354   NA  136   K  4.1   CL  96*   CO2  26   BUN  30*   CREATININE  1.6*   GLUCOSE  174*   CALCIUM  8.6*      Calcium:   Lab Results   Component Value Date    CALCIUM 8.6 08/29/2018    CALCIUM 9.8 08/20/2018    CALCIUM 9.4 07/10/2018      Lactic Acid:   Lab Results   Component Value Date    LACTA 1.0 06/06/2017       DVT prophylaxis:  [x] SCDs                                   ASSESSMENT:     1. POD # 1 LCE with vein patch angioplasty kayleigh completion angiogram  2. HD # 1  Patient Active Problem List   Diagnosis    Diverticulitis of large intestine with perforation without bleeding    Generalized abdominal pain    Colonic diverticular abscess    Bilateral carotid artery stenosis    Atherosclerosis of native artery of both lower extremities with intermittent claudication ([address]    Carotid stenosis, asymptomatic, left   3. Chief Complaint:  No chief complaint on file. PLAN:     1. Stable POD #1 LCE  2. OOB with assist  3. Voiding without difficulty    Agree with above. I examined the patient.

## 2018-08-29 NOTE — DISCHARGE SUMMARY
Physician Discharge Summary          Patient ID:  Carmel Christie  [id number]  15 y.o.  1940    Date of Admission:  8/28/2018    Date of Discharge:  08/29/2018    Admitting Physician:  Rajni Kang M.D. Primary Diagnosis:    1. Asymptomatic left Carotid Stenosis    Other Diagnoses:    1. Essential HTN  2. PVD  3. GERD  4. Hyperlipidemia  5. CKD stage II/III    Operative Procedures:     Date of procedure:  08/28/2018    Preoperative Diagnosis:  1. Asymptomatic left carotid artery stenosis     Post Op Diagnosis: Same     1. Left carotid endarterectomy with bovine pericardial catch  2. Left cervical carotid arteriograms     Anesthesia:  General endotracheal     Estimated Blood Loss:  200 mL     Specimens:  Plaque to pathology     Drains:  Fluted RACHEL right neck wound     Findings:   1. There was at least 90 percent stenosis of the left internal carotid artery. 2.  The stenosis extended 3 cm beyond the carotid bulb. The internal carotid artery was very tortuous, so was shortened with imbrication. 3.  Post cervical carotid arteriogram showed a widely patent internal carotid artery. The external carotid artery remains patent.     Procedure in detail:     After the patient was consented and given intravenous antibiotics, he was brought to the operating room and placed on the operating room table in the supine position. General endotracheal anesthesia was achieved. The patient's left neck was prepped and draped in the usual sterile procedure. A curvilinear incision in the left neck was made anterior to the sternocleidomastoid with scalpel. This was carried through subcutaneous tissue and platysma with bovie electrocautery. Facial vein branches are transected between hemostats and ligated with 3-0 Vicryl and 4-0 Vicryl sutures and/or clips.   The patient was given intravenous heparin and the left common carotid and superior thyroid arteries were then dissected circumferentially and vesseloops placed for future vascular control. Finally, the internal carotid artery is dissected  and vessel loops were placed for future vascular control. Ansa cervicalis was not transected between clips. The hypoglossal and vagus nerves were carefully identified and protected throughout this dissection.      After adequate exposure and heparinization time, the distal internal, common and external carotid arteries are clamped. A longitudinal arteriotomy made in the common carotid artery with 11 blade and carried proximally with Bob scissors and distally along the course of the internal carotid artery and beyond the plaque. The artery was irrigated with heparinized saline and then a Sundt shunt was placed in the internal and then the common carotid artery so flow was restored to the left brain.       [place] elevator was used to remove plaque from the common carotid artery and the plaque was cut flush with the proximal arteriotomy with Bob scissors. An eversion endarterectomy was performed of the external carotid artery with excellent back bleeding. The plaque was removed from the internal carotid artery. The plaque was passed off the field as specimen and then any additional debris is removed under loupe magnification with small forceps. The internal carotid artery was imbricated with running 6-0 prolene sutures posteriorly/laterally/medially. After all the debris was removed, the area was irrigated with heparinized saline and then a bovine pericardial patch of the anterior portion of the common and internal carotid arteries was performed with 6-0 Prolene running suture.   Prior to competing this patch repair, the Sundt shunt was removed and standard flushing techniques were used to remove any air and debris from the native vessels and flow was restored to the left brain.        A butterfly needle was placed in the common carotid artery and left cervical carotid arteriograms were performed with above findings noted.     A fluted RACHEL drain was placed through a separate stab incision in the patient's neck and secured with heavy silk suture. Platysma was closed over the drain with 3-0 PDS running suture. Skin was closed with 4-0 monocryl subcuticular sutures and dermabond skin adhesive. The patient tolerated the procedure well. He was extubated  History and Physical:    See History and Physical on admission without additions nor deletions. Hospital Course:    He was admitted after the above stated procedure (please see operative note for details of procedure). The RACHEL drain was removed on POD#1 with minimal output. On physical exam, His voice sounds ok after CEA and general endotracheal anesthesia. He is alert and oriented and His speech is normal,  His left neck wound is c/d/i without hematoma,  PERRL, EOMI, face symmetric, tongue midline, STAUFFER 5/5 strength. Discharge Instructions:    Discharge instructions were discussed with the patient prior to discharge. The patient was also given written discharge instructions. Discharge Medications:    See Epic for discharge medication list, including any new prescriptions. Follow-Up:    He will follow-up with our office in 2-3 weeks. He can call with any questions or concerns.      Signed:  Barrera Mattson  8/29/2018  10:00 AM

## 2018-08-29 NOTE — OP NOTE
longitudinal arteriotomy made in the common carotid artery with 11 blade and carried proximally with Bob scissors and distally along the course of the internal carotid artery and beyond the plaque. The artery was irrigated with heparinized saline and then a Sundt shunt was placed in the internal and then the common carotid artery so flow was restored to the left brain. [place] elevator was used to remove plaque from the common carotid artery and the plaque was cut flush with the proximal arteriotomy with Bob scissors. An eversion endarterectomy was performed of the external carotid artery with excellent back bleeding. The plaque was removed from the internal carotid artery. The plaque was passed off the field as specimen and then any additional debris is removed under loupe magnification with small forceps. The internal carotid artery was imbricated with running 6-0 prolene sutures posteriorly/laterally/medially. After all the debris was removed, the area was irrigated with heparinized saline and then a bovine pericardial patch of the anterior portion of the common and internal carotid arteries was performed with 6-0 Prolene running suture. Prior to competing this patch repair, the Sundt shunt was removed and standard flushing techniques were used to remove any air and debris from the native vessels and flow was restored to the left brain. A butterfly needle was placed in the common carotid artery and left cervical carotid arteriograms were performed with above findings noted. A fluted RACHEL drain was placed through a separate stab incision in the patient's neck and secured with heavy silk suture. Platysma was closed over the drain with 3-0 PDS running suture. Skin was closed with 4-0 monocryl subcuticular sutures and dermabond skin adhesive. The patient tolerated the procedure well. He was extubated, following all commands in the operating room and moving all extremities.   He was brought to the recovery room in good condition.

## 2018-09-11 ENCOUNTER — OFFICE VISIT (OUTPATIENT)
Dept: VASCULAR SURGERY | Age: 78
End: 2018-09-11

## 2018-09-11 VITALS
SYSTOLIC BLOOD PRESSURE: 128 MMHG | DIASTOLIC BLOOD PRESSURE: 84 MMHG | OXYGEN SATURATION: 98 % | HEART RATE: 69 BPM | TEMPERATURE: 97.8 F | RESPIRATION RATE: 18 BRPM

## 2018-09-11 DIAGNOSIS — I65.23 BILATERAL CAROTID ARTERY STENOSIS: Primary | ICD-10-CM

## 2018-09-11 PROCEDURE — 99024 POSTOP FOLLOW-UP VISIT: CPT | Performed by: NURSE PRACTITIONER

## 2018-09-11 RX ORDER — CLOPIDOGREL BISULFATE 75 MG/1
75 TABLET ORAL DAILY
Qty: 7 TABLET | Refills: 0 | Status: SHIPPED | OUTPATIENT
Start: 2018-09-11 | End: 2018-10-01 | Stop reason: ALTCHOICE

## 2018-09-13 NOTE — PROGRESS NOTES
Dana Trujillo IS A 69 y.o. male who presents for post op evaluation. He had a carotid endarterectomy  Left by Dr. June Bowman 2 weeks ago. He reports no symptoms since surgery. Post op complications reported none. On evaluation today, incision is clean, dry, intact. Symptoms of infection are absent. He will need to continue ASA 81 mg po qd, and plavix for 30 days post carotid endarterectomy. He is to wash incision twice daily with soap and H2O. He requests follow up with Dr. Thai Lemon. We would recommend carotid u/s in 3 months. He will also need yearly left arterial scan and bilateral TERRY's. He is cleared for his TKR. As always, we want to thank you for allowing us to participate in the care of your patients.       Sincerely,     ELISABETH Torrez

## 2018-10-01 ENCOUNTER — HOSPITAL ENCOUNTER (OUTPATIENT)
Dept: PREADMISSION TESTING | Age: 78
Discharge: HOME | End: 2018-10-05
Payer: MEDICARE

## 2018-10-01 VITALS — BODY MASS INDEX: 31.56 KG/M2 | HEIGHT: 72 IN | WEIGHT: 233 LBS

## 2018-10-01 LAB
ALBUMIN SERPL-MCNC: 4.4 G/DL (ref 3.5–5.2)
ALP BLD-CCNC: 84 U/L (ref 40–130)
ALT SERPL-CCNC: 15 U/L (ref 5–41)
ANION GAP SERPL CALCULATED.3IONS-SCNC: 18 MMOL/L (ref 7–19)
APTT: 29.6 SEC (ref 26–36.2)
AST SERPL-CCNC: 16 U/L (ref 5–40)
BACTERIA: NEGATIVE /HPF
BASOPHILS ABSOLUTE: 0.1 K/UL (ref 0–0.2)
BASOPHILS RELATIVE PERCENT: 1 % (ref 0–1)
BILIRUB SERPL-MCNC: 0.7 MG/DL (ref 0.2–1.2)
BILIRUBIN URINE: NEGATIVE
BLOOD, URINE: NEGATIVE
BUN BLDV-MCNC: 27 MG/DL (ref 8–23)
CALCIUM SERPL-MCNC: 9.7 MG/DL (ref 8.8–10.2)
CHLORIDE BLD-SCNC: 97 MMOL/L (ref 98–111)
CHOLESTEROL, TOTAL: 145 MG/DL (ref 160–199)
CLARITY: CLEAR
CO2: 24 MMOL/L (ref 22–29)
COLOR: YELLOW
CREAT SERPL-MCNC: 1.3 MG/DL (ref 0.5–1.2)
EOSINOPHILS ABSOLUTE: 0 K/UL (ref 0–0.6)
EOSINOPHILS RELATIVE PERCENT: 0 % (ref 0–5)
EPITHELIAL CELLS, UA: 0 /HPF (ref 0–5)
GFR NON-AFRICAN AMERICAN: 54
GLUCOSE BLD-MCNC: 144 MG/DL (ref 74–109)
GLUCOSE URINE: NEGATIVE MG/DL
HBA1C MFR BLD: 6.6 % (ref 4–6)
HCT VFR BLD CALC: 46.3 % (ref 42–52)
HDLC SERPL-MCNC: 38 MG/DL (ref 55–121)
HEMOGLOBIN: 15.1 G/DL (ref 14–18)
HYALINE CASTS: 1 /HPF (ref 0–8)
INR BLD: 1 (ref 0.88–1.18)
KETONES, URINE: NEGATIVE MG/DL
LDL CHOLESTEROL CALCULATED: 72 MG/DL
LEUKOCYTE ESTERASE, URINE: NEGATIVE
LYMPHOCYTES ABSOLUTE: 1.5 K/UL (ref 1.1–4.5)
LYMPHOCYTES RELATIVE PERCENT: 24.3 % (ref 20–40)
MCH RBC QN AUTO: 29.2 PG (ref 27–31)
MCHC RBC AUTO-ENTMCNC: 32.6 G/DL (ref 33–37)
MCV RBC AUTO: 89.6 FL (ref 80–94)
MONOCYTES ABSOLUTE: 0.6 K/UL (ref 0–0.9)
MONOCYTES RELATIVE PERCENT: 9.9 % (ref 0–10)
NEUTROPHILS ABSOLUTE: 3.9 K/UL (ref 1.5–7.5)
NEUTROPHILS RELATIVE PERCENT: 64.3 % (ref 50–65)
NITRITE, URINE: NEGATIVE
PDW BLD-RTO: 12.3 % (ref 11.5–14.5)
PH UA: 6.5
PLATELET # BLD: 159 K/UL (ref 130–400)
PMV BLD AUTO: 11.8 FL (ref 9.4–12.4)
POTASSIUM SERPL-SCNC: 3.9 MMOL/L (ref 3.5–5)
PROTEIN UA: 30 MG/DL
PROTHROMBIN TIME: 13.1 SEC (ref 12–14.6)
RBC # BLD: 5.17 M/UL (ref 4.7–6.1)
RBC UA: 1 /HPF (ref 0–4)
SODIUM BLD-SCNC: 139 MMOL/L (ref 136–145)
SPECIFIC GRAVITY UA: 1.02
TOTAL PROTEIN: 7.2 G/DL (ref 6.6–8.7)
TRIGL SERPL-MCNC: 176 MG/DL (ref 0–149)
URINE REFLEX TO CULTURE: ABNORMAL
UROBILINOGEN, URINE: 0.2 E.U./DL
WBC # BLD: 6 K/UL (ref 4.8–10.8)
WBC UA: 1 /HPF (ref 0–5)

## 2018-10-01 PROCEDURE — 87081 CULTURE SCREEN ONLY: CPT

## 2018-10-01 PROCEDURE — 81001 URINALYSIS AUTO W/SCOPE: CPT

## 2018-10-01 PROCEDURE — 80061 LIPID PANEL: CPT

## 2018-10-01 PROCEDURE — 80053 COMPREHEN METABOLIC PANEL: CPT

## 2018-10-01 PROCEDURE — 85730 THROMBOPLASTIN TIME PARTIAL: CPT

## 2018-10-01 PROCEDURE — 85610 PROTHROMBIN TIME: CPT

## 2018-10-01 PROCEDURE — 93005 ELECTROCARDIOGRAM TRACING: CPT

## 2018-10-01 PROCEDURE — 85025 COMPLETE CBC W/AUTO DIFF WBC: CPT

## 2018-10-01 PROCEDURE — 83036 HEMOGLOBIN GLYCOSYLATED A1C: CPT

## 2018-10-01 RX ORDER — DEXAMETHASONE SODIUM PHOSPHATE 10 MG/ML
10 INJECTION, SOLUTION INTRAMUSCULAR; INTRAVENOUS ONCE
Status: CANCELLED | OUTPATIENT
Start: 2018-10-15

## 2018-10-01 RX ORDER — OXYCODONE HCL 10 MG/1
10 TABLET, FILM COATED, EXTENDED RELEASE ORAL ONCE
Status: CANCELLED | OUTPATIENT
Start: 2018-10-15

## 2018-10-01 RX ORDER — PREGABALIN 75 MG/1
75 CAPSULE ORAL ONCE
Status: CANCELLED | OUTPATIENT
Start: 2018-10-15

## 2018-10-01 RX ORDER — CELECOXIB 200 MG/1
200 CAPSULE ORAL ONCE
Status: CANCELLED | OUTPATIENT
Start: 2018-10-15

## 2018-10-01 RX ORDER — ACETAMINOPHEN 500 MG
1000 TABLET ORAL ONCE
Status: CANCELLED | OUTPATIENT
Start: 2018-10-15

## 2018-10-02 LAB
EKG P AXIS: 13 DEGREES
EKG P-R INTERVAL: 138 MS
EKG Q-T INTERVAL: 444 MS
EKG QRS DURATION: 106 MS
EKG QTC CALCULATION (BAZETT): 429 MS
EKG T AXIS: 38 DEGREES
MRSA CULTURE ONLY: NORMAL

## 2018-10-14 PROBLEM — M17.12 PRIMARY OSTEOARTHRITIS OF LEFT KNEE: Status: ACTIVE | Noted: 2018-10-14

## 2018-10-15 ENCOUNTER — HOSPITAL ENCOUNTER (INPATIENT)
Age: 78
LOS: 3 days | Discharge: HOME HEALTH | DRG: 470 | End: 2018-10-18
Attending: ORTHOPAEDIC SURGERY | Admitting: ORTHOPAEDIC SURGERY
Payer: MEDICARE

## 2018-10-15 ENCOUNTER — ANESTHESIA (OUTPATIENT)
Dept: OPERATING ROOM | Age: 78
DRG: 470 | End: 2018-10-15
Payer: MEDICARE

## 2018-10-15 ENCOUNTER — ANESTHESIA EVENT (OUTPATIENT)
Dept: OPERATING ROOM | Age: 78
DRG: 470 | End: 2018-10-15
Payer: MEDICARE

## 2018-10-15 ENCOUNTER — APPOINTMENT (OUTPATIENT)
Dept: GENERAL RADIOLOGY | Age: 78
DRG: 470 | End: 2018-10-15
Attending: ORTHOPAEDIC SURGERY
Payer: MEDICARE

## 2018-10-15 VITALS — DIASTOLIC BLOOD PRESSURE: 49 MMHG | TEMPERATURE: 97 F | SYSTOLIC BLOOD PRESSURE: 109 MMHG | OXYGEN SATURATION: 99 %

## 2018-10-15 DIAGNOSIS — M17.12 PRIMARY OSTEOARTHRITIS OF LEFT KNEE: Primary | ICD-10-CM

## 2018-10-15 PROBLEM — E78.00 PURE HYPERCHOLESTEROLEMIA: Status: ACTIVE | Noted: 2018-10-15

## 2018-10-15 PROBLEM — K21.9 GERD (GASTROESOPHAGEAL REFLUX DISEASE): Status: ACTIVE | Noted: 2018-10-15

## 2018-10-15 PROBLEM — D50.9 IRON DEFICIENCY ANEMIA: Status: ACTIVE | Noted: 2018-10-15

## 2018-10-15 PROBLEM — N18.2 CKD (CHRONIC KIDNEY DISEASE), STAGE II: Status: ACTIVE | Noted: 2018-10-15

## 2018-10-15 PROBLEM — K59.01 SLOW TRANSIT CONSTIPATION: Status: ACTIVE | Noted: 2018-10-15

## 2018-10-15 PROBLEM — M17.10 ARTHRITIS OF KNEE: Status: ACTIVE | Noted: 2018-10-15

## 2018-10-15 PROBLEM — I10 ESSENTIAL HYPERTENSION: Status: ACTIVE | Noted: 2018-10-15

## 2018-10-15 LAB
ABO/RH: NORMAL
ANTIBODY SCREEN: NORMAL

## 2018-10-15 PROCEDURE — 2580000003 HC RX 258: Performed by: ORTHOPAEDIC SURGERY

## 2018-10-15 PROCEDURE — 7100000000 HC PACU RECOVERY - FIRST 15 MIN: Performed by: ORTHOPAEDIC SURGERY

## 2018-10-15 PROCEDURE — 36415 COLL VENOUS BLD VENIPUNCTURE: CPT

## 2018-10-15 PROCEDURE — 2720000010 HC SURG SUPPLY STERILE: Performed by: ORTHOPAEDIC SURGERY

## 2018-10-15 PROCEDURE — 2780000010 HC IMPLANT OTHER: Performed by: ORTHOPAEDIC SURGERY

## 2018-10-15 PROCEDURE — C1776 JOINT DEVICE (IMPLANTABLE): HCPCS | Performed by: ORTHOPAEDIC SURGERY

## 2018-10-15 PROCEDURE — 3700000001 HC ADD 15 MINUTES (ANESTHESIA): Performed by: ORTHOPAEDIC SURGERY

## 2018-10-15 PROCEDURE — 2580000003 HC RX 258: Performed by: ANESTHESIOLOGY

## 2018-10-15 PROCEDURE — C9290 INJ, BUPIVACAINE LIPOSOME: HCPCS | Performed by: ORTHOPAEDIC SURGERY

## 2018-10-15 PROCEDURE — 73560 X-RAY EXAM OF KNEE 1 OR 2: CPT

## 2018-10-15 PROCEDURE — 3600000015 HC SURGERY LEVEL 5 ADDTL 15MIN: Performed by: ORTHOPAEDIC SURGERY

## 2018-10-15 PROCEDURE — 1210000000 HC MED SURG R&B

## 2018-10-15 PROCEDURE — 6360000002 HC RX W HCPCS: Performed by: ANESTHESIOLOGY

## 2018-10-15 PROCEDURE — 2500000003 HC RX 250 WO HCPCS: Performed by: ORTHOPAEDIC SURGERY

## 2018-10-15 PROCEDURE — 6360000002 HC RX W HCPCS: Performed by: ORTHOPAEDIC SURGERY

## 2018-10-15 PROCEDURE — 94664 DEMO&/EVAL PT USE INHALER: CPT

## 2018-10-15 PROCEDURE — 3700000000 HC ANESTHESIA ATTENDED CARE: Performed by: ORTHOPAEDIC SURGERY

## 2018-10-15 PROCEDURE — 7100000001 HC PACU RECOVERY - ADDTL 15 MIN: Performed by: ORTHOPAEDIC SURGERY

## 2018-10-15 PROCEDURE — 64447 NJX AA&/STRD FEMORAL NRV IMG: CPT | Performed by: NURSE ANESTHETIST, CERTIFIED REGISTERED

## 2018-10-15 PROCEDURE — 86901 BLOOD TYPING SEROLOGIC RH(D): CPT

## 2018-10-15 PROCEDURE — 6370000000 HC RX 637 (ALT 250 FOR IP): Performed by: ORTHOPAEDIC SURGERY

## 2018-10-15 PROCEDURE — 2500000003 HC RX 250 WO HCPCS: Performed by: NURSE ANESTHETIST, CERTIFIED REGISTERED

## 2018-10-15 PROCEDURE — 3600000005 HC SURGERY LEVEL 5 BASE: Performed by: ORTHOPAEDIC SURGERY

## 2018-10-15 PROCEDURE — 3E0T3BZ INTRODUCTION OF ANESTHETIC AGENT INTO PERIPHERAL NERVES AND PLEXI, PERCUTANEOUS APPROACH: ICD-10-PCS | Performed by: ORTHOPAEDIC SURGERY

## 2018-10-15 PROCEDURE — 2709999900 HC NON-CHARGEABLE SUPPLY: Performed by: ORTHOPAEDIC SURGERY

## 2018-10-15 PROCEDURE — 6360000002 HC RX W HCPCS: Performed by: NURSE ANESTHETIST, CERTIFIED REGISTERED

## 2018-10-15 PROCEDURE — 86850 RBC ANTIBODY SCREEN: CPT

## 2018-10-15 PROCEDURE — 0SRD0J9 REPLACEMENT OF LEFT KNEE JOINT WITH SYNTHETIC SUBSTITUTE, CEMENTED, OPEN APPROACH: ICD-10-PCS | Performed by: ORTHOPAEDIC SURGERY

## 2018-10-15 PROCEDURE — C1713 ANCHOR/SCREW BN/BN,TIS/BN: HCPCS | Performed by: ORTHOPAEDIC SURGERY

## 2018-10-15 PROCEDURE — 86900 BLOOD TYPING SEROLOGIC ABO: CPT

## 2018-10-15 DEVICE — COMPONENT ARTC SURF PS 10-12 GH 10 MM LT TIB FIX BEAR: Type: IMPLANTABLE DEVICE | Site: KNEE | Status: FUNCTIONAL

## 2018-10-15 DEVICE — COMPONENT FEM SZ 11 NAR L KNEE CO CHROM CEM POST STBL MID: Type: IMPLANTABLE DEVICE | Site: KNEE | Status: FUNCTIONAL

## 2018-10-15 DEVICE — Z  INACTIVE USE 2750024 CEMENT BONE 40GM W/ GENTMYCN HI VISC PALACOS R+G: Type: IMPLANTABLE DEVICE | Site: KNEE | Status: FUNCTIONAL

## 2018-10-15 DEVICE — DUP USE 339820 IMPL KNEE PSN TIB STM 5 DEG SZ G L: Type: IMPLANTABLE DEVICE | Site: KNEE | Status: FUNCTIONAL

## 2018-10-15 DEVICE — IMPL KNEE PSN ALL POLY PAT 38MM: Type: IMPLANTABLE DEVICE | Site: PATELLA | Status: FUNCTIONAL

## 2018-10-15 DEVICE — SEALANT HEMSTAT 5ML HUM FIBRIN THROM 2 VI APPL DEV EVICEL: Type: IMPLANTABLE DEVICE | Site: KNEE | Status: FUNCTIONAL

## 2018-10-15 RX ORDER — ONDANSETRON 2 MG/ML
4 INJECTION INTRAMUSCULAR; INTRAVENOUS EVERY 6 HOURS PRN
Status: DISCONTINUED | OUTPATIENT
Start: 2018-10-15 | End: 2018-10-18 | Stop reason: HOSPADM

## 2018-10-15 RX ORDER — BUPIVACAINE HYDROCHLORIDE 7.5 MG/ML
INJECTION, SOLUTION INTRASPINAL PRN
Status: DISCONTINUED | OUTPATIENT
Start: 2018-10-15 | End: 2018-10-15 | Stop reason: SDUPTHER

## 2018-10-15 RX ORDER — DIPHENHYDRAMINE HCL 25 MG
25 TABLET ORAL EVERY 6 HOURS PRN
Status: DISCONTINUED | OUTPATIENT
Start: 2018-10-15 | End: 2018-10-18 | Stop reason: HOSPADM

## 2018-10-15 RX ORDER — MEPERIDINE HYDROCHLORIDE 50 MG/ML
12.5 INJECTION INTRAMUSCULAR; INTRAVENOUS; SUBCUTANEOUS EVERY 5 MIN PRN
Status: DISCONTINUED | OUTPATIENT
Start: 2018-10-15 | End: 2018-10-15 | Stop reason: HOSPADM

## 2018-10-15 RX ORDER — SODIUM CHLORIDE 9 MG/ML
INJECTION, SOLUTION INTRAVENOUS CONTINUOUS
Status: DISCONTINUED | OUTPATIENT
Start: 2018-10-15 | End: 2018-10-18 | Stop reason: HOSPADM

## 2018-10-15 RX ORDER — DEXAMETHASONE SODIUM PHOSPHATE 10 MG/ML
10 INJECTION INTRAMUSCULAR; INTRAVENOUS ONCE
Status: DISCONTINUED | OUTPATIENT
Start: 2018-10-15 | End: 2018-10-18 | Stop reason: HOSPADM

## 2018-10-15 RX ORDER — FENTANYL CITRATE 50 UG/ML
50 INJECTION, SOLUTION INTRAMUSCULAR; INTRAVENOUS
Status: DISCONTINUED | OUTPATIENT
Start: 2018-10-15 | End: 2018-10-15 | Stop reason: HOSPADM

## 2018-10-15 RX ORDER — TAMSULOSIN HYDROCHLORIDE 0.4 MG/1
0.4 CAPSULE ORAL DAILY
Status: DISCONTINUED | OUTPATIENT
Start: 2018-10-15 | End: 2018-10-18 | Stop reason: HOSPADM

## 2018-10-15 RX ORDER — EPHEDRINE SULFATE 50 MG/ML
INJECTION, SOLUTION INTRAVENOUS PRN
Status: DISCONTINUED | OUTPATIENT
Start: 2018-10-15 | End: 2018-10-15 | Stop reason: SDUPTHER

## 2018-10-15 RX ORDER — ROPIVACAINE HYDROCHLORIDE 5 MG/ML
INJECTION, SOLUTION EPIDURAL; INFILTRATION; PERINEURAL PRN
Status: DISCONTINUED | OUTPATIENT
Start: 2018-10-15 | End: 2018-10-15 | Stop reason: SDUPTHER

## 2018-10-15 RX ORDER — SODIUM CHLORIDE 0.9 % (FLUSH) 0.9 %
10 SYRINGE (ML) INJECTION EVERY 12 HOURS SCHEDULED
Status: DISCONTINUED | OUTPATIENT
Start: 2018-10-15 | End: 2018-10-15 | Stop reason: HOSPADM

## 2018-10-15 RX ORDER — OXYCODONE HYDROCHLORIDE 5 MG/1
20 TABLET ORAL EVERY 4 HOURS PRN
Status: DISCONTINUED | OUTPATIENT
Start: 2018-10-15 | End: 2018-10-18 | Stop reason: HOSPADM

## 2018-10-15 RX ORDER — HYDRALAZINE HYDROCHLORIDE 20 MG/ML
5 INJECTION INTRAMUSCULAR; INTRAVENOUS EVERY 10 MIN PRN
Status: DISCONTINUED | OUTPATIENT
Start: 2018-10-15 | End: 2018-10-15 | Stop reason: HOSPADM

## 2018-10-15 RX ORDER — NIFEDIPINE 60 MG/1
60 TABLET, EXTENDED RELEASE ORAL DAILY
Status: DISCONTINUED | OUTPATIENT
Start: 2018-10-16 | End: 2018-10-18 | Stop reason: HOSPADM

## 2018-10-15 RX ORDER — MIDAZOLAM HYDROCHLORIDE 1 MG/ML
2 INJECTION INTRAMUSCULAR; INTRAVENOUS
Status: COMPLETED | OUTPATIENT
Start: 2018-10-15 | End: 2018-10-15

## 2018-10-15 RX ORDER — SODIUM CHLORIDE 0.9 % (FLUSH) 0.9 %
10 SYRINGE (ML) INJECTION EVERY 12 HOURS SCHEDULED
Status: DISCONTINUED | OUTPATIENT
Start: 2018-10-15 | End: 2018-10-18 | Stop reason: HOSPADM

## 2018-10-15 RX ORDER — OXYCODONE HYDROCHLORIDE 5 MG/1
10 TABLET ORAL EVERY 4 HOURS PRN
Status: DISCONTINUED | OUTPATIENT
Start: 2018-10-15 | End: 2018-10-18 | Stop reason: HOSPADM

## 2018-10-15 RX ORDER — METOCLOPRAMIDE HYDROCHLORIDE 5 MG/ML
10 INJECTION INTRAMUSCULAR; INTRAVENOUS
Status: DISCONTINUED | OUTPATIENT
Start: 2018-10-15 | End: 2018-10-15 | Stop reason: HOSPADM

## 2018-10-15 RX ORDER — ACETAMINOPHEN 500 MG
1000 TABLET ORAL ONCE
Status: COMPLETED | OUTPATIENT
Start: 2018-10-15 | End: 2018-10-15

## 2018-10-15 RX ORDER — DOCUSATE SODIUM 100 MG/1
100 CAPSULE, LIQUID FILLED ORAL 2 TIMES DAILY
Status: DISCONTINUED | OUTPATIENT
Start: 2018-10-15 | End: 2018-10-18 | Stop reason: HOSPADM

## 2018-10-15 RX ORDER — DIAZEPAM 5 MG/1
5 TABLET ORAL EVERY 6 HOURS PRN
Status: DISCONTINUED | OUTPATIENT
Start: 2018-10-15 | End: 2018-10-18 | Stop reason: HOSPADM

## 2018-10-15 RX ORDER — OXYCODONE HCL 10 MG/1
10 TABLET, FILM COATED, EXTENDED RELEASE ORAL ONCE
Status: COMPLETED | OUTPATIENT
Start: 2018-10-15 | End: 2018-10-15

## 2018-10-15 RX ORDER — CLINDAMYCIN PHOSPHATE 900 MG/50ML
900 INJECTION INTRAVENOUS EVERY 8 HOURS
Status: COMPLETED | OUTPATIENT
Start: 2018-10-15 | End: 2018-10-17

## 2018-10-15 RX ORDER — SODIUM CHLORIDE 0.9 % (FLUSH) 0.9 %
10 SYRINGE (ML) INJECTION PRN
Status: DISCONTINUED | OUTPATIENT
Start: 2018-10-15 | End: 2018-10-18 | Stop reason: HOSPADM

## 2018-10-15 RX ORDER — ATORVASTATIN CALCIUM 20 MG/1
20 TABLET, FILM COATED ORAL DAILY
Status: DISCONTINUED | OUTPATIENT
Start: 2018-10-16 | End: 2018-10-18 | Stop reason: HOSPADM

## 2018-10-15 RX ORDER — PROPOFOL 10 MG/ML
INJECTION, EMULSION INTRAVENOUS CONTINUOUS PRN
Status: DISCONTINUED | OUTPATIENT
Start: 2018-10-15 | End: 2018-10-15 | Stop reason: SDUPTHER

## 2018-10-15 RX ORDER — ACETAMINOPHEN 500 MG
1000 TABLET ORAL EVERY 8 HOURS
Status: DISCONTINUED | OUTPATIENT
Start: 2018-10-15 | End: 2018-10-18 | Stop reason: HOSPADM

## 2018-10-15 RX ORDER — BUPIVACAINE HYDROCHLORIDE 2.5 MG/ML
INJECTION, SOLUTION INFILTRATION; PERINEURAL PRN
Status: DISCONTINUED | OUTPATIENT
Start: 2018-10-15 | End: 2018-10-15 | Stop reason: HOSPADM

## 2018-10-15 RX ORDER — ENALAPRILAT 2.5 MG/2ML
1.25 INJECTION INTRAVENOUS
Status: DISCONTINUED | OUTPATIENT
Start: 2018-10-15 | End: 2018-10-15 | Stop reason: HOSPADM

## 2018-10-15 RX ORDER — OXYCODONE HYDROCHLORIDE 5 MG/1
5 TABLET ORAL EVERY 4 HOURS PRN
Status: DISCONTINUED | OUTPATIENT
Start: 2018-10-15 | End: 2018-10-18 | Stop reason: HOSPADM

## 2018-10-15 RX ORDER — LIDOCAINE HYDROCHLORIDE 10 MG/ML
1 INJECTION, SOLUTION EPIDURAL; INFILTRATION; INTRACAUDAL; PERINEURAL
Status: DISCONTINUED | OUTPATIENT
Start: 2018-10-15 | End: 2018-10-15 | Stop reason: HOSPADM

## 2018-10-15 RX ORDER — ONDANSETRON 2 MG/ML
INJECTION INTRAMUSCULAR; INTRAVENOUS PRN
Status: DISCONTINUED | OUTPATIENT
Start: 2018-10-15 | End: 2018-10-15 | Stop reason: SDUPTHER

## 2018-10-15 RX ORDER — SODIUM CHLORIDE 0.9 % (FLUSH) 0.9 %
10 SYRINGE (ML) INJECTION PRN
Status: DISCONTINUED | OUTPATIENT
Start: 2018-10-15 | End: 2018-10-15 | Stop reason: HOSPADM

## 2018-10-15 RX ORDER — SODIUM CHLORIDE, SODIUM LACTATE, POTASSIUM CHLORIDE, CALCIUM CHLORIDE 600; 310; 30; 20 MG/100ML; MG/100ML; MG/100ML; MG/100ML
INJECTION, SOLUTION INTRAVENOUS CONTINUOUS
Status: DISCONTINUED | OUTPATIENT
Start: 2018-10-15 | End: 2018-10-16

## 2018-10-15 RX ORDER — DEXAMETHASONE SODIUM PHOSPHATE 10 MG/ML
INJECTION INTRAMUSCULAR; INTRAVENOUS PRN
Status: DISCONTINUED | OUTPATIENT
Start: 2018-10-15 | End: 2018-10-15 | Stop reason: SDUPTHER

## 2018-10-15 RX ORDER — MORPHINE SULFATE/0.9% NACL/PF 1 MG/ML
2 SYRINGE (ML) INJECTION EVERY 5 MIN PRN
Status: DISCONTINUED | OUTPATIENT
Start: 2018-10-15 | End: 2018-10-15 | Stop reason: HOSPADM

## 2018-10-15 RX ORDER — PREGABALIN 75 MG/1
75 CAPSULE ORAL ONCE
Status: COMPLETED | OUTPATIENT
Start: 2018-10-15 | End: 2018-10-15

## 2018-10-15 RX ORDER — TRAMADOL HYDROCHLORIDE 50 MG/1
50 TABLET ORAL EVERY 6 HOURS
Status: DISCONTINUED | OUTPATIENT
Start: 2018-10-15 | End: 2018-10-18 | Stop reason: HOSPADM

## 2018-10-15 RX ORDER — BISACODYL 10 MG
10 SUPPOSITORY, RECTAL RECTAL DAILY PRN
Status: DISCONTINUED | OUTPATIENT
Start: 2018-10-15 | End: 2018-10-18 | Stop reason: HOSPADM

## 2018-10-15 RX ORDER — 0.9 % SODIUM CHLORIDE 0.9 %
500 INTRAVENOUS SOLUTION INTRAVENOUS PRN
Status: DISCONTINUED | OUTPATIENT
Start: 2018-10-15 | End: 2018-10-18 | Stop reason: HOSPADM

## 2018-10-15 RX ORDER — OXYCODONE HCL 10 MG/1
10 TABLET, FILM COATED, EXTENDED RELEASE ORAL EVERY 12 HOURS SCHEDULED
Status: DISCONTINUED | OUTPATIENT
Start: 2018-10-15 | End: 2018-10-18 | Stop reason: HOSPADM

## 2018-10-15 RX ORDER — FENTANYL CITRATE 50 UG/ML
25 INJECTION, SOLUTION INTRAMUSCULAR; INTRAVENOUS
Status: DISCONTINUED | OUTPATIENT
Start: 2018-10-15 | End: 2018-10-15 | Stop reason: HOSPADM

## 2018-10-15 RX ORDER — DIPHENHYDRAMINE HYDROCHLORIDE 50 MG/ML
12.5 INJECTION INTRAMUSCULAR; INTRAVENOUS
Status: DISCONTINUED | OUTPATIENT
Start: 2018-10-15 | End: 2018-10-15 | Stop reason: HOSPADM

## 2018-10-15 RX ORDER — LABETALOL HYDROCHLORIDE 5 MG/ML
5 INJECTION, SOLUTION INTRAVENOUS EVERY 10 MIN PRN
Status: DISCONTINUED | OUTPATIENT
Start: 2018-10-15 | End: 2018-10-15 | Stop reason: HOSPADM

## 2018-10-15 RX ORDER — METOPROLOL SUCCINATE 50 MG/1
100 TABLET, EXTENDED RELEASE ORAL DAILY
Status: DISCONTINUED | OUTPATIENT
Start: 2018-10-16 | End: 2018-10-18 | Stop reason: HOSPADM

## 2018-10-15 RX ORDER — FENTANYL CITRATE 50 UG/ML
INJECTION, SOLUTION INTRAMUSCULAR; INTRAVENOUS PRN
Status: DISCONTINUED | OUTPATIENT
Start: 2018-10-15 | End: 2018-10-15 | Stop reason: SDUPTHER

## 2018-10-15 RX ORDER — TRANEXAMIC ACID 100 MG/ML
INJECTION, SOLUTION INTRAVENOUS PRN
Status: DISCONTINUED | OUTPATIENT
Start: 2018-10-15 | End: 2018-10-15 | Stop reason: SDUPTHER

## 2018-10-15 RX ORDER — LIDOCAINE HYDROCHLORIDE 10 MG/ML
INJECTION, SOLUTION EPIDURAL; INFILTRATION; INTRACAUDAL; PERINEURAL PRN
Status: DISCONTINUED | OUTPATIENT
Start: 2018-10-15 | End: 2018-10-15 | Stop reason: SDUPTHER

## 2018-10-15 RX ORDER — MORPHINE SULFATE/0.9% NACL/PF 1 MG/ML
4 SYRINGE (ML) INJECTION EVERY 5 MIN PRN
Status: DISCONTINUED | OUTPATIENT
Start: 2018-10-15 | End: 2018-10-15 | Stop reason: HOSPADM

## 2018-10-15 RX ORDER — ASPIRIN 81 MG/1
81 TABLET, CHEWABLE ORAL DAILY
Status: DISCONTINUED | OUTPATIENT
Start: 2018-10-15 | End: 2018-10-18 | Stop reason: HOSPADM

## 2018-10-15 RX ORDER — PANTOPRAZOLE SODIUM 40 MG/1
40 TABLET, DELAYED RELEASE ORAL DAILY
Status: DISCONTINUED | OUTPATIENT
Start: 2018-10-16 | End: 2018-10-16

## 2018-10-15 RX ADMIN — PREGABALIN 75 MG: 75 CAPSULE ORAL at 08:51

## 2018-10-15 RX ADMIN — EPHEDRINE SULFATE 10 MG: 50 INJECTION, SOLUTION INTRAMUSCULAR; INTRAVENOUS; SUBCUTANEOUS at 11:01

## 2018-10-15 RX ADMIN — CLINDAMYCIN PHOSPHATE 900 MG: 900 INJECTION, SOLUTION INTRAVENOUS at 22:35

## 2018-10-15 RX ADMIN — OXYCODONE HYDROCHLORIDE 10 MG: 5 TABLET ORAL at 22:35

## 2018-10-15 RX ADMIN — DOCUSATE SODIUM 100 MG: 100 CAPSULE, LIQUID FILLED ORAL at 21:03

## 2018-10-15 RX ADMIN — ACETAMINOPHEN 1000 MG: 500 TABLET, FILM COATED ORAL at 08:50

## 2018-10-15 RX ADMIN — Medication 2 G: at 18:26

## 2018-10-15 RX ADMIN — DOCUSATE SODIUM 100 MG: 100 CAPSULE, LIQUID FILLED ORAL at 14:32

## 2018-10-15 RX ADMIN — TRANEXAMIC ACID 1000 MG: 100 INJECTION, SOLUTION INTRAVENOUS at 10:40

## 2018-10-15 RX ADMIN — FENTANYL CITRATE 125 MCG: 50 INJECTION INTRAMUSCULAR; INTRAVENOUS at 10:34

## 2018-10-15 RX ADMIN — PROPOFOL 50 MCG/KG/MIN: 10 INJECTION, EMULSION INTRAVENOUS at 10:33

## 2018-10-15 RX ADMIN — BUPIVACAINE HYDROCHLORIDE IN DEXTROSE 2 ML: 7.5 INJECTION, SOLUTION SUBARACHNOID at 10:32

## 2018-10-15 RX ADMIN — ACETAMINOPHEN 1000 MG: 500 TABLET, FILM COATED ORAL at 22:35

## 2018-10-15 RX ADMIN — SODIUM CHLORIDE: 9 INJECTION, SOLUTION INTRAVENOUS at 14:23

## 2018-10-15 RX ADMIN — DEXAMETHASONE SODIUM PHOSPHATE 10 MG: 10 INJECTION INTRAMUSCULAR; INTRAVENOUS at 10:38

## 2018-10-15 RX ADMIN — ROPIVACAINE HYDROCHLORIDE 20 ML: 5 INJECTION, SOLUTION EPIDURAL; INFILTRATION; PERINEURAL at 09:44

## 2018-10-15 RX ADMIN — OXYCODONE HYDROCHLORIDE 10 MG: 10 TABLET, FILM COATED, EXTENDED RELEASE ORAL at 21:03

## 2018-10-15 RX ADMIN — EPHEDRINE SULFATE 10 MG: 50 INJECTION, SOLUTION INTRAMUSCULAR; INTRAVENOUS; SUBCUTANEOUS at 11:43

## 2018-10-15 RX ADMIN — MIDAZOLAM HYDROCHLORIDE 2 MG: 2 INJECTION, SOLUTION INTRAMUSCULAR; INTRAVENOUS at 09:41

## 2018-10-15 RX ADMIN — TRANEXAMIC ACID 1000 MG: 100 INJECTION, SOLUTION INTRAVENOUS at 12:04

## 2018-10-15 RX ADMIN — EPHEDRINE SULFATE 10 MG: 50 INJECTION, SOLUTION INTRAMUSCULAR; INTRAVENOUS; SUBCUTANEOUS at 11:16

## 2018-10-15 RX ADMIN — TRAMADOL HYDROCHLORIDE 50 MG: 50 TABLET, FILM COATED ORAL at 14:32

## 2018-10-15 RX ADMIN — ASPIRIN 81 MG 81 MG: 81 TABLET ORAL at 21:02

## 2018-10-15 RX ADMIN — SODIUM CHLORIDE, SODIUM LACTATE, POTASSIUM CHLORIDE, AND CALCIUM CHLORIDE: 600; 310; 30; 20 INJECTION, SOLUTION INTRAVENOUS at 08:51

## 2018-10-15 RX ADMIN — RIVAROXABAN 10 MG: 10 TABLET, FILM COATED ORAL at 21:02

## 2018-10-15 RX ADMIN — LIDOCAINE HYDROCHLORIDE 30 MG: 10 INJECTION, SOLUTION EPIDURAL; INFILTRATION; INTRACAUDAL; PERINEURAL at 10:32

## 2018-10-15 RX ADMIN — TRAMADOL HYDROCHLORIDE 50 MG: 50 TABLET, FILM COATED ORAL at 21:03

## 2018-10-15 RX ADMIN — OXYCODONE HYDROCHLORIDE 10 MG: 10 TABLET, FILM COATED, EXTENDED RELEASE ORAL at 08:51

## 2018-10-15 RX ADMIN — CLINDAMYCIN PHOSPHATE 900 MG: 900 INJECTION, SOLUTION INTRAVENOUS at 14:33

## 2018-10-15 RX ADMIN — Medication 2 G: at 10:37

## 2018-10-15 RX ADMIN — OXYCODONE HYDROCHLORIDE 5 MG: 5 TABLET ORAL at 18:25

## 2018-10-15 RX ADMIN — ONDANSETRON HYDROCHLORIDE 4 MG: 2 INJECTION, SOLUTION INTRAMUSCULAR; INTRAVENOUS at 10:37

## 2018-10-15 RX ADMIN — SODIUM CHLORIDE, SODIUM LACTATE, POTASSIUM CHLORIDE, AND CALCIUM CHLORIDE: 600; 310; 30; 20 INJECTION, SOLUTION INTRAVENOUS at 11:00

## 2018-10-15 RX ADMIN — TAMSULOSIN HYDROCHLORIDE 0.4 MG: 0.4 CAPSULE ORAL at 14:32

## 2018-10-15 RX ADMIN — ACETAMINOPHEN 1000 MG: 500 TABLET, FILM COATED ORAL at 14:32

## 2018-10-15 ASSESSMENT — PAIN SCALES - GENERAL
PAINLEVEL_OUTOF10: 0
PAINLEVEL_OUTOF10: 3
PAINLEVEL_OUTOF10: 0
PAINLEVEL_OUTOF10: 0
PAINLEVEL_OUTOF10: 2
PAINLEVEL_OUTOF10: 3
PAINLEVEL_OUTOF10: 0
PAINLEVEL_OUTOF10: 6
PAINLEVEL_OUTOF10: 0

## 2018-10-15 ASSESSMENT — LIFESTYLE VARIABLES: SMOKING_STATUS: 0

## 2018-10-15 ASSESSMENT — PAIN - FUNCTIONAL ASSESSMENT: PAIN_FUNCTIONAL_ASSESSMENT: 0-10

## 2018-10-15 ASSESSMENT — ENCOUNTER SYMPTOMS: SHORTNESS OF BREATH: 0

## 2018-10-15 NOTE — ANESTHESIA PRE PROCEDURE
results found for: PREGTESTUR, PREGSERUM, HCG, HCGQUANT     ABGs: No results found for: PHART, PO2ART, ZAB8KCX, NXN8JUW, BEART, F9PHDXUE     Type & Screen (If Applicable):  No results found for: LABABO, [address]    Anesthesia Evaluation  Patient summary reviewed and Nursing notes reviewed no history of anesthetic complications:   Airway: Mallampati: II  TM distance: >3 FB   Neck ROM: full  Mouth opening: > = 3 FB Dental: normal exam   (+) lower dentures      Pulmonary:Negative Pulmonary ROS and normal exam  breath sounds clear to auscultation      (-) shortness of breath and not a current smoker          Patient did not smoke on day of surgery. Cardiovascular:    (+) hypertension:,     (-) CAD, CABG/stent,  angina and  CHF    NYHA Classification: I  ECG reviewed  Rhythm: regular  Rate: normal           Beta Blocker:  Dose within 24 Hrs         Neuro/Psych:   Negative Neuro/Psych ROS     (-) seizures, CVA and depression/anxiety            GI/Hepatic/Renal: Neg GI/Hepatic/Renal ROS  (+) GERD: well controlled, renal disease: CRI,      (-) hiatal hernia       Endo/Other: Negative Endo/Other ROS       (-) hypothyroidism, hyperthyroidism, blood dyscrasia        Pt had PAT visit. Abdominal:   (+) obese,     Abdomen: soft. Vascular:   + PVD, aortic or cerebral, . Anesthesia Plan      spinal and regional     ASA 3     (Decadron/Zofran Intraop )  Induction: intravenous. MIPS: Postoperative opioids intended and Prophylactic antiemetics administered. Anesthetic plan and risks discussed with patient. Use of blood products discussed with patient whom. Plan discussed with CRNA.     Attending anesthesiologist reviewed and agrees with Pre Eval content            Indira Blackman MD   10/15/2018

## 2018-10-15 NOTE — PROGRESS NOTES
4 Eyes Skin Assessment    Alberto Arrioal is being assessed upon: admission  I agree that I, Fuad Terry, along with Roge Angelo RN (either 2 RN's or 1 LPN and 1 RN) have performed a thorough Head to Toe Skin Assessment on the patient. ALL assessment sites listed below have been assessed. Areas assessed by both nurses:     [x]   Head, Face, and Ears   [x]   Shoulders, Back, and Chest  [x]   Arms, Elbows, and Hands   [x]   Coccyx, Sacrum, and Ischium  [x]   Legs, Feet, and Heels    Does the Patient have Skin Breakdown? No    Ángel Prevention initiated: No  Wound Care Orders initiated: No    [facility] nurse consulted for Pressure Injury (Stage 3,4, Unstageable, DTI, NWPT, and Complex wounds) and New or Established Ostomies: No        Primary Nurse eSignature: Fuad Terry RN on 10/15/2018 at 3:55 PM      Co-Signer eSignature:    Alberto Arriola arrived to room # [id number] 6340. Presented with: POST SURGERY  Mental Status: Patient is oriented and alert. Vitals:    10/15/18 1530   BP: 121/65   Pulse: 54   Resp: 18   Temp: 96.6 °F (35.9 °C)   SpO2: 93%     Patient safety contract and falls prevention contract reviewed with patient Yes. Oriented Patient to room. Call light within reach. Yes.   Needs, issues or concerns expressed at this time: no.      Electronically signed by Fuad Terry RN on 10/15/2018 at 3:55 PM

## 2018-10-16 LAB
ANION GAP SERPL CALCULATED.3IONS-SCNC: 13 MMOL/L (ref 7–19)
BUN BLDV-MCNC: 35 MG/DL (ref 8–23)
CALCIUM SERPL-MCNC: 8.2 MG/DL (ref 8.8–10.2)
CHLORIDE BLD-SCNC: 98 MMOL/L (ref 98–111)
CO2: 24 MMOL/L (ref 22–29)
CREAT SERPL-MCNC: 1.6 MG/DL (ref 0.5–1.2)
GFR NON-AFRICAN AMERICAN: 42
GLUCOSE BLD-MCNC: 182 MG/DL (ref 70–99)
GLUCOSE BLD-MCNC: 183 MG/DL (ref 70–99)
GLUCOSE BLD-MCNC: 204 MG/DL (ref 70–99)
GLUCOSE BLD-MCNC: 209 MG/DL (ref 70–99)
GLUCOSE BLD-MCNC: 232 MG/DL (ref 74–109)
HBA1C MFR BLD: 6.6 % (ref 4–6)
HCT VFR BLD CALC: 33.4 % (ref 42–52)
HEMOGLOBIN: 11.2 G/DL (ref 14–18)
IRON SATURATION: 15 % (ref 14–50)
IRON: 37 UG/DL (ref 59–158)
PERFORMED ON: ABNORMAL
POTASSIUM REFLEX MAGNESIUM: 4.3 MMOL/L (ref 3.5–5)
SODIUM BLD-SCNC: 135 MMOL/L (ref 136–145)
TOTAL IRON BINDING CAPACITY: 244 UG/DL (ref 250–400)
URIC ACID, SERUM: 5.8 MG/DL (ref 3.4–7)
VITAMIN B-12: 337 PG/ML (ref 211–946)
VITAMIN D 25-HYDROXY: 29.9 NG/ML

## 2018-10-16 PROCEDURE — 82607 VITAMIN B-12: CPT

## 2018-10-16 PROCEDURE — 83036 HEMOGLOBIN GLYCOSYLATED A1C: CPT

## 2018-10-16 PROCEDURE — 97116 GAIT TRAINING THERAPY: CPT

## 2018-10-16 PROCEDURE — 6370000000 HC RX 637 (ALT 250 FOR IP): Performed by: ORTHOPAEDIC SURGERY

## 2018-10-16 PROCEDURE — 85014 HEMATOCRIT: CPT

## 2018-10-16 PROCEDURE — 6360000002 HC RX W HCPCS: Performed by: ORTHOPAEDIC SURGERY

## 2018-10-16 PROCEDURE — 2580000003 HC RX 258: Performed by: ORTHOPAEDIC SURGERY

## 2018-10-16 PROCEDURE — 97530 THERAPEUTIC ACTIVITIES: CPT

## 2018-10-16 PROCEDURE — 1210000000 HC MED SURG R&B

## 2018-10-16 PROCEDURE — 85018 HEMOGLOBIN: CPT

## 2018-10-16 PROCEDURE — 36415 COLL VENOUS BLD VENIPUNCTURE: CPT

## 2018-10-16 PROCEDURE — 82306 VITAMIN D 25 HYDROXY: CPT

## 2018-10-16 PROCEDURE — 83550 IRON BINDING TEST: CPT

## 2018-10-16 PROCEDURE — 2580000003 HC RX 258: Performed by: PHYSICIAN ASSISTANT

## 2018-10-16 PROCEDURE — 82948 REAGENT STRIP/BLOOD GLUCOSE: CPT

## 2018-10-16 PROCEDURE — P9045 ALBUMIN (HUMAN), 5%, 250 ML: HCPCS | Performed by: PHYSICIAN ASSISTANT

## 2018-10-16 PROCEDURE — 97161 PT EVAL LOW COMPLEX 20 MIN: CPT

## 2018-10-16 PROCEDURE — 2500000003 HC RX 250 WO HCPCS: Performed by: ORTHOPAEDIC SURGERY

## 2018-10-16 PROCEDURE — 6370000000 HC RX 637 (ALT 250 FOR IP): Performed by: PHYSICIAN ASSISTANT

## 2018-10-16 PROCEDURE — 6370000000 HC RX 637 (ALT 250 FOR IP): Performed by: FAMILY MEDICINE

## 2018-10-16 PROCEDURE — 2580000003 HC RX 258: Performed by: INTERNAL MEDICINE

## 2018-10-16 PROCEDURE — G8979 MOBILITY GOAL STATUS: HCPCS

## 2018-10-16 PROCEDURE — G8978 MOBILITY CURRENT STATUS: HCPCS

## 2018-10-16 PROCEDURE — 83540 ASSAY OF IRON: CPT

## 2018-10-16 PROCEDURE — 84550 ASSAY OF BLOOD/URIC ACID: CPT

## 2018-10-16 PROCEDURE — 6360000002 HC RX W HCPCS: Performed by: PHYSICIAN ASSISTANT

## 2018-10-16 PROCEDURE — 80048 BASIC METABOLIC PNL TOTAL CA: CPT

## 2018-10-16 RX ORDER — 0.9 % SODIUM CHLORIDE 0.9 %
500 INTRAVENOUS SOLUTION INTRAVENOUS ONCE
Status: COMPLETED | OUTPATIENT
Start: 2018-10-16 | End: 2018-10-16

## 2018-10-16 RX ORDER — MAGNESIUM HYDROXIDE/ALUMINUM HYDROXICE/SIMETHICONE 120; 1200; 1200 MG/30ML; MG/30ML; MG/30ML
30 SUSPENSION ORAL EVERY 6 HOURS PRN
Status: DISCONTINUED | OUTPATIENT
Start: 2018-10-16 | End: 2018-10-18 | Stop reason: HOSPADM

## 2018-10-16 RX ORDER — DEXTROSE MONOHYDRATE 25 G/50ML
12.5 INJECTION, SOLUTION INTRAVENOUS PRN
Status: DISCONTINUED | OUTPATIENT
Start: 2018-10-16 | End: 2018-10-18 | Stop reason: HOSPADM

## 2018-10-16 RX ORDER — FAMOTIDINE 20 MG/1
20 TABLET, FILM COATED ORAL 2 TIMES DAILY
Status: DISCONTINUED | OUTPATIENT
Start: 2018-10-16 | End: 2018-10-18 | Stop reason: HOSPADM

## 2018-10-16 RX ORDER — DEXTROSE MONOHYDRATE 50 MG/ML
100 INJECTION, SOLUTION INTRAVENOUS PRN
Status: DISCONTINUED | OUTPATIENT
Start: 2018-10-16 | End: 2018-10-18 | Stop reason: HOSPADM

## 2018-10-16 RX ORDER — NICOTINE POLACRILEX 4 MG
15 LOZENGE BUCCAL PRN
Status: DISCONTINUED | OUTPATIENT
Start: 2018-10-16 | End: 2018-10-18 | Stop reason: HOSPADM

## 2018-10-16 RX ORDER — ALBUMIN, HUMAN INJ 5% 5 %
12.5 SOLUTION INTRAVENOUS ONCE
Status: COMPLETED | OUTPATIENT
Start: 2018-10-16 | End: 2018-10-16

## 2018-10-16 RX ADMIN — INSULIN LISPRO 1 UNITS: 100 INJECTION, SOLUTION INTRAVENOUS; SUBCUTANEOUS at 20:40

## 2018-10-16 RX ADMIN — FAMOTIDINE 20 MG: 20 TABLET, FILM COATED ORAL at 10:41

## 2018-10-16 RX ADMIN — OXYCODONE HYDROCHLORIDE 10 MG: 10 TABLET, FILM COATED, EXTENDED RELEASE ORAL at 10:29

## 2018-10-16 RX ADMIN — ACETAMINOPHEN 1000 MG: 500 TABLET, FILM COATED ORAL at 22:34

## 2018-10-16 RX ADMIN — TRAMADOL HYDROCHLORIDE 50 MG: 50 TABLET, FILM COATED ORAL at 10:28

## 2018-10-16 RX ADMIN — APIXABAN 2.5 MG: 2.5 TABLET, FILM COATED ORAL at 20:38

## 2018-10-16 RX ADMIN — ATORVASTATIN CALCIUM 20 MG: 20 TABLET, FILM COATED ORAL at 10:42

## 2018-10-16 RX ADMIN — TRAMADOL HYDROCHLORIDE 50 MG: 50 TABLET, FILM COATED ORAL at 14:40

## 2018-10-16 RX ADMIN — INSULIN LISPRO 2 UNITS: 100 INJECTION, SOLUTION INTRAVENOUS; SUBCUTANEOUS at 13:18

## 2018-10-16 RX ADMIN — SODIUM CHLORIDE: 9 INJECTION, SOLUTION INTRAVENOUS at 22:36

## 2018-10-16 RX ADMIN — OXYCODONE HYDROCHLORIDE 10 MG: 5 TABLET ORAL at 17:59

## 2018-10-16 RX ADMIN — Medication 2 G: at 02:13

## 2018-10-16 RX ADMIN — INSULIN LISPRO 1 UNITS: 100 INJECTION, SOLUTION INTRAVENOUS; SUBCUTANEOUS at 17:59

## 2018-10-16 RX ADMIN — MAGNESIUM HYDROXIDE 30 ML: 400 SUSPENSION ORAL at 13:10

## 2018-10-16 RX ADMIN — ALBUMIN (HUMAN) 12.5 G: 12.5 INJECTION, SOLUTION INTRAVENOUS at 12:15

## 2018-10-16 RX ADMIN — CLINDAMYCIN PHOSPHATE 900 MG: 900 INJECTION, SOLUTION INTRAVENOUS at 08:28

## 2018-10-16 RX ADMIN — ALUMINUM HYDROXIDE, MAGNESIUM HYDROXIDE, AND SIMETHICONE 30 ML: 200; 200; 20 SUSPENSION ORAL at 15:17

## 2018-10-16 RX ADMIN — Medication 0.5 MG: at 20:38

## 2018-10-16 RX ADMIN — OXYCODONE HYDROCHLORIDE 10 MG: 5 TABLET ORAL at 05:32

## 2018-10-16 RX ADMIN — ACETAMINOPHEN 1000 MG: 500 TABLET, FILM COATED ORAL at 05:32

## 2018-10-16 RX ADMIN — FAMOTIDINE 20 MG: 20 TABLET, FILM COATED ORAL at 20:38

## 2018-10-16 RX ADMIN — INSULIN LISPRO 2 UNITS: 100 INJECTION, SOLUTION INTRAVENOUS; SUBCUTANEOUS at 08:53

## 2018-10-16 RX ADMIN — TRAMADOL HYDROCHLORIDE 50 MG: 50 TABLET, FILM COATED ORAL at 03:46

## 2018-10-16 RX ADMIN — OXYCODONE HYDROCHLORIDE 20 MG: 5 TABLET ORAL at 22:34

## 2018-10-16 RX ADMIN — DOCUSATE SODIUM 100 MG: 100 CAPSULE, LIQUID FILLED ORAL at 20:38

## 2018-10-16 RX ADMIN — TAMSULOSIN HYDROCHLORIDE 0.4 MG: 0.4 CAPSULE ORAL at 10:42

## 2018-10-16 RX ADMIN — SODIUM CHLORIDE 500 ML: 9 INJECTION, SOLUTION INTRAVENOUS at 09:42

## 2018-10-16 RX ADMIN — CLINDAMYCIN PHOSPHATE 900 MG: 900 INJECTION, SOLUTION INTRAVENOUS at 22:34

## 2018-10-16 RX ADMIN — ACETAMINOPHEN 1000 MG: 500 TABLET, FILM COATED ORAL at 14:40

## 2018-10-16 RX ADMIN — ASPIRIN 81 MG 81 MG: 81 TABLET ORAL at 10:42

## 2018-10-16 RX ADMIN — SODIUM CHLORIDE: 9 INJECTION, SOLUTION INTRAVENOUS at 05:32

## 2018-10-16 RX ADMIN — DIAZEPAM 5 MG: 5 TABLET ORAL at 22:34

## 2018-10-16 RX ADMIN — DOCUSATE SODIUM 100 MG: 100 CAPSULE, LIQUID FILLED ORAL at 10:41

## 2018-10-16 RX ADMIN — CLINDAMYCIN PHOSPHATE 900 MG: 900 INJECTION, SOLUTION INTRAVENOUS at 14:40

## 2018-10-16 RX ADMIN — OXYCODONE HYDROCHLORIDE 10 MG: 10 TABLET, FILM COATED, EXTENDED RELEASE ORAL at 20:38

## 2018-10-16 RX ADMIN — TRAMADOL HYDROCHLORIDE 50 MG: 50 TABLET, FILM COATED ORAL at 20:38

## 2018-10-16 ASSESSMENT — PAIN DESCRIPTION - LOCATION: LOCATION: KNEE

## 2018-10-16 ASSESSMENT — PAIN SCALES - GENERAL
PAINLEVEL_OUTOF10: 0
PAINLEVEL_OUTOF10: 4
PAINLEVEL_OUTOF10: 3
PAINLEVEL_OUTOF10: 8
PAINLEVEL_OUTOF10: 6
PAINLEVEL_OUTOF10: 2
PAINLEVEL_OUTOF10: 3
PAINLEVEL_OUTOF10: 2
PAINLEVEL_OUTOF10: 5
PAINLEVEL_OUTOF10: 0
PAINLEVEL_OUTOF10: 8

## 2018-10-16 ASSESSMENT — PAIN SCALES - WONG BAKER: WONGBAKER_NUMERICALRESPONSE: 4

## 2018-10-16 ASSESSMENT — PAIN DESCRIPTION - PAIN TYPE: TYPE: ACUTE PAIN

## 2018-10-16 ASSESSMENT — PAIN DESCRIPTION - ORIENTATION: ORIENTATION: LEFT

## 2018-10-16 NOTE — CONSULTS
jaundice, melena and rectal bleeding  MUSCULOSKELETAL:  Left knee positive for muscle weakness, myalgias and stiffness  NEUROLOGICAL:   negative for dizziness, headaches, seizures, speech problems, tremors and vertigo  INTEGUMENT: negative for pruritus, rash, skin color change and skin lesion(s)   A Full 14 point review of systems is negative outside those listed above and in the HPI      History    Past Medical History:   Diagnosis Date    Back pain     \"with tired legs as a result\"    Blood circulation, collateral     Carotid arterial disease ([address]     recent surgery    CKD (chronic kidney disease), 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/2018    GERD (gastroesophageal reflux disease)     Hyperlipidemia     Hypertension     Hypertension     Primary osteoarthritis of left knee 10/14/2018    PVD (peripheral vascular disease) [facility])      Past Surgical History:   Procedure Laterality Date    BACK SURGERY      COLONOSCOPY  2007?  HI COLONOSCOPY FLX DX W/COLLJ SPEC WHEN PFRMD N/A 9/11/2017    Dr Erica Rossi internal hemorrhoids, diverticular disease-HP-No recall (age)   [facility] HI REVISE MEDIAN N/CARPAL TUNNEL SURG Left 7/18/2018    OPEN CARPAL TUNNEL RELEASE performed by Eladio Machado MD at [address] Left 8/28/2018    LEFT CAROTID ENDARTERECTOMY WITH VEIN PATCH ANGIOPLASTY AND COMPLETION ANGIOGRAM performed by Olga Lang MD at [address] 10/15/2018    LEFT COMPLEX TOTAL KNEE ARTHROPLASTY performed by Eladio Machado MD at [facility] VASCULAR SURGERY  04/21/2015    Marisol BESS Ultrasound guided access of left common femoral artery. Aortogram.Diagnostic right lower extremity arteriogram.Radiologic supervision and interpretation.  VASCULAR SURGERY  01/13/2015    Marisol Lai M.D Atherectomy,angioplasty,and stenting of left superficial femoral artery.     VASCULAR SURGERY  03/11/2014    Marisol Lai M.D. distension. There is no tenderness. There is no rebound and no guarding. Musculoskeletal: Left knee restricted range of motion. no edema or tenderness. Post-op changes noted  Neurological:  alert and oriented to person, place, and time. normal reflexes. No focal deficits  Skin: Skin is warm and dry. No new rashes appreciated. Postsurgical changes noted to left knee without signs of active infection or drainage    Results Review:   I reviewed the patient's new imaging results and agree with the interpretation. Active Problems: Treatment recommendations    Primary osteoarthritis of left knee-postop day 1 left total knee arthroplasty    Bilateral carotid artery stenosis-recent carotid endarterectomy left sided Dr. Hyman Cos 2018    PAD bilateral lower extremities with previous stenting left femoral artery    Essential hypertension--Hyzaar 100/25, Toprol 100, nifedipine XL 60 mg    Pure hypercholesterolemia--Lipitor 20 mg    Slow transit constipation--bowel regimen    Hyperglycemia-- preop 144, check A1c suspect newly diagnosed DM    Iron deficiency anemia--hemoglobin 11.2    CKD (chronic kidney disease), stage II--pre-op GFR 54/CR 1.3 (postop 42/1.6)    GERD (gastroesophageal reflux disease)--Protonix    Anemia post-op expected-check iron, B12,and folate. Replenish as needed. Transfuse at acceptable levels depending on clinical judgement and comorbidities. Recheck in AM  Constipation-start with Miralax 1 capful BID until BM, then decrease to 1 x a day,then can step up to Amitizia 24 mcg po BID which can be used for opiod induced constipation,and ultimately end up with Relistor 12 mcg sub Q q 48 hours to block the effect of narcotics on the gut. Hypertension-review pre and post BP's,will restart home BP meds when BP allows. Add Clonidine 0.1 mg q 4 hours prn if bp> 140/90,monitor BP and adjust meds as necessary. Blood sugars noted.  Explained to patient the need for better control to optimize the

## 2018-10-16 NOTE — CARE COORDINATION
Spoke with patient regarding MD orders for [facility] services. Patient agreeable and has chosen [facility]. Referral Faxed. [facility] [phone]. [phone]. Please notify [facility] when patient discharges and fax DC Summary,  DC med list and any new [facility] orders.   Electronically signed by Afsaneh Preciado RN on 10/16/18 at 11:48 AM

## 2018-10-16 NOTE — CONSULTS
Timur Muller MD, Last Rate: 175 mL/hr at 10/16/18 0532  0.9 % sodium chloride bolus, 500 mL, Intravenous, PRN, Lisandra Finn MD  sodium chloride flush 0.9 % injection 10 mL, 10 mL, Intravenous, 2 times per day, Lisandra Finn MD  sodium chloride flush 0.9 % injection 10 mL, 10 mL, Intravenous, PRN, Lisandra Finn MD  acetaminophen (TYLENOL) tablet 1,000 mg, 1,000 mg, Oral, Q8H, Lisandra Finn MD, 1,000 mg at 10/16/18 1440  oxyCODONE (ROXICODONE) immediate release tablet 5 mg, 5 mg, Oral, Q4H PRN, 5 mg at 10/15/18 1825 **OR** oxyCODONE (ROXICODONE) immediate release tablet 10 mg, 10 mg, Oral, Q4H PRN, Lisandra Finn MD  docusate sodium (COLACE) capsule 100 mg, 100 mg, Oral, BID, Lisandra Finn MD, 100 mg at 10/16/18 1041  magnesium hydroxide (MILK OF MAGNESIA) 400 MG/5ML suspension 30 mL, 30 mL, Oral, Daily PRN, Lisandra Finn MD, 30 mL at 10/16/18 1310  bisacodyl (DULCOLAX) suppository 10 mg, 10 mg, Rectal, Daily PRN, Lisandra Finn MD  [facility]ron [facility]) injection 4 mg, 4 mg, Intravenous, Q6H PRN, Lisandra Finn MD  oxyCODONE (OXYCONTIN) extended release tablet 10 mg, 10 mg, Oral, 2 times per day, Lisandra Finn MD, 10 mg at 10/16/18 1029  oxyCODONE (ROXICODONE) immediate release tablet 10 mg, 10 mg, Oral, Q4H PRN, 10 mg at 10/16/18 0532 **OR** oxyCODONE (ROXICODONE) immediate release tablet 20 mg, 20 mg, Oral, Q4H PRN, Lisandra Finn MD  diazepam (VALIUM) tablet 5 mg, 5 mg, Oral, Q6H PRN, Lisandra Finn MD  traMADol Richard Tineo) tablet 50 mg, 50 mg, Oral, Q6H, Lisandra Finn MD, 50 mg at 10/16/18 1440  HYDROmorphone (DILAUDID) injection 0.5 mg, 0.5 mg, Intravenous, Q3H PRN **OR** HYDROmorphone (DILAUDID) injection 1 mg, 1 mg, Intravenous, Q3H PRN, Lisandra Finn MD  HYDROmorphone (DILAUDID) injection 1 mg, 1 mg, Intravenous, Q3H PRN **OR** HYDROmorphone (DILAUDID) injection 2 mg, 2 mg, Intravenous, Q3H PRN, Lisandra Finn MD  diphenhydrAMINE (BENADRYL) tablet 25 mg, 25 mg, Oral, Q6H PRN, Never Used                        Alcohol use: Yes           7.2 oz/week       Glasses of wine: 12 per week       Comment: 2 glasses of wine every night    Drug use: No              Sexual activity: Yes               Partners with: Female        Physical Exam    Blood pressure (!) 107/59, pulse 57, temperature 97.4 °F (36.3 °C), temperature source Temporal, resp. rate 18, height 6' (1.829 m), weight 233 lb (105.7 kg), SpO2 98 %. General:  NAD, A+Ox3, ill-appearing, normal body habitus  HEENT:  PERRL, EOMI  Neck:  Supple, normal range of movement  Chest:  CTAB, good respiratory effort, good air movement  CV:  RRR, no murmurs   Abdomen:  NTND, soft, +BS, no hepatosplenomegaly  Extremities:  No peripheral edema  Neurological:  Moving all four extremities,  Lymphatics:  No palpable lymph nodes   Skin:  No rash, no jaundice  Psychiatric:  Normal insight and judgement, good recall    Data                    10/16/18                       0346          HGB          11.2*         HCT          33.4*                         10/16/18                       0346          NA           135*          K            4.3           CL           98            CO2          24            GLUCOSE      232*          BUN          35*           CREATININE   1.6*          LABGLOM      42*             Assessment:  -CKD stage 3 (due to previous use of NSAIDs)  -Knee osteoarthritis  -Benign HTN  -PVD      Plan:  I will decrease rate of IV fluids to prevent fluids overload. Will repeat renal function and check on PTH level. Avoid nephrotoxins and hypotension.     Thank you for asking us to participate in the management of your patient, please do not hesitate to contact me for any concerns regarding my recommendations as outlined above.    -----------------------------  Electronically signed by Josie Muniz MD on 10/16/18 at 3:19 PM

## 2018-10-16 NOTE — PROGRESS NOTES
Physical Therapy    Facility/Department: [facility] SURG SERVICES  Initial Assessment    NAME: Katia Lake  : 1940  MRN: [id number]    Date of Service: 10/16/2018    Discharge Recommendations:  Continue to assess pending progress        Patient Diagnosis(es): There were no encounter diagnoses. has a past medical history of Back pain; Blood circulation, collateral; Carotid arterial disease ([facility] [address] CKD (chronic kidney disease), stage II; GERD (gastroesophageal reflux disease); Hyperlipidemia; Hypertension; Hypertension; Primary osteoarthritis of left knee; and PVD (peripheral vascular disease) ([facility] has a past surgical history that includes back surgery; Tonsillectomy and adenoidectomy; Colonoscopy (?); pr colonoscopy flx dx w/collj spec when pfrmd (N/A, 2017); pr revise median n/carpal tunnel surg (Left, 2018); pr thromboendartectmy neck,neck incis (Left, 2018); vascular surgery (2015); vascular surgery (2015); vascular surgery (2014); vascular surgery (2013); and pr total knee arthroplasty (Left, 10/15/2018).     Restrictions  Restrictions/Precautions  Restrictions/Precautions: Fall Risk, Weight Bearing  Lower Extremity Weight Bearing Restrictions  Left Lower Extremity Weight Bearing: Weight Bearing As Tolerated  Vision/Hearing        Subjective  General  Diagnosis: L TKR  Subjective  Subjective: Pt AGREEABLE TO RECLINER FOR BREAKFAST  Pain Screening  Patient Currently in Pain: Yes  Pain Assessment  Pain Assessment: Faces  Zapata-Baker Pain Rating: Hurts little more  Pain Type: Acute pain  Pain Location: Knee  Pain Orientation: Left  Vital Signs  Patient Currently in Pain: Yes       Orientation       Social/Functional History  Social/Functional History  Lives With: Spouse  Type of Home: House  Home Layout: One level  Home Access: Stairs to enter without rails  Entrance Stairs - Number of Steps: 3  Bathroom Shower/Tub: Walk-in shower  Bathroom Toilet: Handicap

## 2018-10-17 ENCOUNTER — APPOINTMENT (OUTPATIENT)
Dept: ULTRASOUND IMAGING | Age: 78
DRG: 470 | End: 2018-10-17
Attending: ORTHOPAEDIC SURGERY
Payer: MEDICARE

## 2018-10-17 LAB
ANION GAP SERPL CALCULATED.3IONS-SCNC: 13 MMOL/L (ref 7–19)
BACTERIA: NEGATIVE /HPF
BILIRUBIN URINE: NEGATIVE
BLOOD, URINE: NEGATIVE
BUN BLDV-MCNC: 34 MG/DL (ref 8–23)
CALCIUM SERPL-MCNC: 8 MG/DL (ref 8.8–10.2)
CHLORIDE BLD-SCNC: 100 MMOL/L (ref 98–111)
CLARITY: CLEAR
CO2: 23 MMOL/L (ref 22–29)
COLOR: YELLOW
CREAT SERPL-MCNC: 1.9 MG/DL (ref 0.5–1.2)
CREATININE URINE: 231.3 MG/DL (ref 4.2–622)
EPITHELIAL CELLS, UA: 0 /HPF (ref 0–5)
GFR NON-AFRICAN AMERICAN: 35
GLUCOSE BLD-MCNC: 127 MG/DL (ref 70–99)
GLUCOSE BLD-MCNC: 148 MG/DL (ref 74–109)
GLUCOSE BLD-MCNC: 154 MG/DL (ref 70–99)
GLUCOSE BLD-MCNC: 157 MG/DL (ref 70–99)
GLUCOSE BLD-MCNC: 163 MG/DL (ref 70–99)
GLUCOSE URINE: NEGATIVE MG/DL
HCT VFR BLD CALC: 27.6 % (ref 42–52)
HEMOGLOBIN: 8.3 G/DL (ref 14–18)
HYALINE CASTS: 8 /HPF (ref 0–8)
KETONES, URINE: ABNORMAL MG/DL
LEUKOCYTE ESTERASE, URINE: NEGATIVE
NITRITE, URINE: NEGATIVE
PARATHYROID HORMONE INTACT: 81.6 PG/ML (ref 15–65)
PERFORMED ON: ABNORMAL
PH UA: 5.5
POTASSIUM REFLEX MAGNESIUM: 5.3 MMOL/L (ref 3.5–5)
PROTEIN UA: 30 MG/DL
RBC UA: 1 /HPF (ref 0–4)
SODIUM BLD-SCNC: 136 MMOL/L (ref 136–145)
SODIUM URINE: 34 MMOL/L
SPECIFIC GRAVITY UA: 1.03
UROBILINOGEN, URINE: 0.2 E.U./DL
WBC UA: 1 /HPF (ref 0–5)

## 2018-10-17 PROCEDURE — 82948 REAGENT STRIP/BLOOD GLUCOSE: CPT

## 2018-10-17 PROCEDURE — 76770 US EXAM ABDO BACK WALL COMP: CPT

## 2018-10-17 PROCEDURE — 81003 URINALYSIS AUTO W/O SCOPE: CPT

## 2018-10-17 PROCEDURE — 6370000000 HC RX 637 (ALT 250 FOR IP): Performed by: ORTHOPAEDIC SURGERY

## 2018-10-17 PROCEDURE — 97530 THERAPEUTIC ACTIVITIES: CPT

## 2018-10-17 PROCEDURE — 82570 ASSAY OF URINE CREATININE: CPT

## 2018-10-17 PROCEDURE — 6370000000 HC RX 637 (ALT 250 FOR IP): Performed by: PHYSICIAN ASSISTANT

## 2018-10-17 PROCEDURE — 2500000003 HC RX 250 WO HCPCS: Performed by: ORTHOPAEDIC SURGERY

## 2018-10-17 PROCEDURE — 83970 ASSAY OF PARATHORMONE: CPT

## 2018-10-17 PROCEDURE — 85018 HEMOGLOBIN: CPT

## 2018-10-17 PROCEDURE — 6370000000 HC RX 637 (ALT 250 FOR IP): Performed by: FAMILY MEDICINE

## 2018-10-17 PROCEDURE — 84300 ASSAY OF URINE SODIUM: CPT

## 2018-10-17 PROCEDURE — 85014 HEMATOCRIT: CPT

## 2018-10-17 PROCEDURE — 1210000000 HC MED SURG R&B

## 2018-10-17 PROCEDURE — 80048 BASIC METABOLIC PNL TOTAL CA: CPT

## 2018-10-17 PROCEDURE — 97116 GAIT TRAINING THERAPY: CPT

## 2018-10-17 PROCEDURE — 36415 COLL VENOUS BLD VENIPUNCTURE: CPT

## 2018-10-17 RX ORDER — IRON POLYSACCHARIDE COMPLEX 150 MG
150 CAPSULE ORAL 2 TIMES DAILY
Status: DISCONTINUED | OUTPATIENT
Start: 2018-10-17 | End: 2018-10-18 | Stop reason: HOSPADM

## 2018-10-17 RX ADMIN — FAMOTIDINE 20 MG: 20 TABLET, FILM COATED ORAL at 07:48

## 2018-10-17 RX ADMIN — Medication 150 MG: at 07:48

## 2018-10-17 RX ADMIN — TRAMADOL HYDROCHLORIDE 50 MG: 50 TABLET, FILM COATED ORAL at 07:48

## 2018-10-17 RX ADMIN — Medication 150 MG: at 20:53

## 2018-10-17 RX ADMIN — DOCUSATE SODIUM 100 MG: 100 CAPSULE, LIQUID FILLED ORAL at 20:53

## 2018-10-17 RX ADMIN — OXYCODONE HYDROCHLORIDE 10 MG: 10 TABLET, FILM COATED, EXTENDED RELEASE ORAL at 20:53

## 2018-10-17 RX ADMIN — TRAMADOL HYDROCHLORIDE 50 MG: 50 TABLET, FILM COATED ORAL at 20:53

## 2018-10-17 RX ADMIN — TRAMADOL HYDROCHLORIDE 50 MG: 50 TABLET, FILM COATED ORAL at 02:47

## 2018-10-17 RX ADMIN — APIXABAN 2.5 MG: 2.5 TABLET, FILM COATED ORAL at 20:53

## 2018-10-17 RX ADMIN — APIXABAN 2.5 MG: 2.5 TABLET, FILM COATED ORAL at 07:49

## 2018-10-17 RX ADMIN — METOPROLOL SUCCINATE 100 MG: 50 TABLET, EXTENDED RELEASE ORAL at 07:48

## 2018-10-17 RX ADMIN — DOCUSATE SODIUM 100 MG: 100 CAPSULE, LIQUID FILLED ORAL at 07:49

## 2018-10-17 RX ADMIN — FAMOTIDINE 20 MG: 20 TABLET, FILM COATED ORAL at 20:53

## 2018-10-17 RX ADMIN — OXYCODONE HYDROCHLORIDE 10 MG: 10 TABLET, FILM COATED, EXTENDED RELEASE ORAL at 07:48

## 2018-10-17 RX ADMIN — INSULIN LISPRO 1 UNITS: 100 INJECTION, SOLUTION INTRAVENOUS; SUBCUTANEOUS at 11:45

## 2018-10-17 RX ADMIN — ACETAMINOPHEN 1000 MG: 500 TABLET, FILM COATED ORAL at 06:37

## 2018-10-17 RX ADMIN — CLINDAMYCIN PHOSPHATE 900 MG: 900 INJECTION, SOLUTION INTRAVENOUS at 06:37

## 2018-10-17 RX ADMIN — ATORVASTATIN CALCIUM 20 MG: 20 TABLET, FILM COATED ORAL at 07:48

## 2018-10-17 RX ADMIN — ASPIRIN 81 MG 81 MG: 81 TABLET ORAL at 07:49

## 2018-10-17 RX ADMIN — ACETAMINOPHEN 1000 MG: 500 TABLET, FILM COATED ORAL at 14:01

## 2018-10-17 RX ADMIN — TAMSULOSIN HYDROCHLORIDE 0.4 MG: 0.4 CAPSULE ORAL at 07:48

## 2018-10-17 RX ADMIN — OXYCODONE HYDROCHLORIDE 20 MG: 5 TABLET ORAL at 02:48

## 2018-10-17 RX ADMIN — MAGNESIUM HYDROXIDE 30 ML: 400 SUSPENSION ORAL at 13:49

## 2018-10-17 RX ADMIN — OXYCODONE HYDROCHLORIDE 20 MG: 5 TABLET ORAL at 06:38

## 2018-10-17 RX ADMIN — ALUMINUM HYDROXIDE, MAGNESIUM HYDROXIDE, AND SIMETHICONE 30 ML: 200; 200; 20 SUSPENSION ORAL at 07:53

## 2018-10-17 RX ADMIN — INSULIN LISPRO 1 UNITS: 100 INJECTION, SOLUTION INTRAVENOUS; SUBCUTANEOUS at 16:45

## 2018-10-17 RX ADMIN — TRAMADOL HYDROCHLORIDE 50 MG: 50 TABLET, FILM COATED ORAL at 14:01

## 2018-10-17 RX ADMIN — NIFEDIPINE 60 MG: 60 TABLET, FILM COATED, EXTENDED RELEASE ORAL at 07:48

## 2018-10-17 ASSESSMENT — PAIN SCALES - GENERAL
PAINLEVEL_OUTOF10: 4
PAINLEVEL_OUTOF10: 4
PAINLEVEL_OUTOF10: 7
PAINLEVEL_OUTOF10: 3
PAINLEVEL_OUTOF10: 8

## 2018-10-17 NOTE — PROGRESS NOTES
release tablet 10 mg  10 mg Oral Q4H PRN Jose Luis Camacho MD   10 mg at 10/16/18 1759    Or    oxyCODONE (ROXICODONE) immediate release tablet 20 mg  20 mg Oral Q4H PRN Jose Luis Camacho MD   20 mg at 10/17/18 8244    diazepam (VALIUM) tablet 5 mg  5 mg Oral Q6H PRN Jose Luis Camacho MD   5 mg at 10/16/18 2234    traMADol (ULTRAM) tablet 50 mg  50 mg Oral Q6H Jose Luis Camacho MD   50 mg at 10/17/18 0748    HYDROmorphone (DILAUDID) injection 0.5 mg  0.5 mg Intravenous Q3H PRN Jose Luis Camacho MD   0.5 mg at 10/16/18 2038    Or    HYDROmorphone (DILAUDID) injection 1 mg  1 mg Intravenous Q3H PRN Jose Luis Camacho MD        HYDROmorphone (DILAUDID) injection 1 mg  1 mg Intravenous Q3H PRN Jose Luis Camacho MD        Or    HYDROmorphone (DILAUDID) injection 2 mg  2 mg Intravenous Q3H PRN Jose Luis Camacho MD        diphenhydrAMINE (BENADRYL) tablet 25 mg  25 mg Oral Q6H PRN Jose Luis Camacho MD        aspirin chewable tablet 81 mg  81 mg Oral Daily Jose Luis Camacho MD   81 mg at 10/17/18 0749    tamsulosin (FLOMAX) capsule 0.4 mg  0.4 mg Oral Daily Jose Luis Camacho MD   0.4 mg at 10/17/18 0748    dexamethasone (DECADRON) injection 10 mg  10 mg Intravenous Once Jose Luis Camacho MD           Past Medical History:  Past Medical History:   Diagnosis Date    Back pain     \"with tired legs as a result\"    Blood circulation, collateral     Carotid arterial disease ([facility] [address]     recent surgery    CKD (chronic kidney disease), 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/2018    GERD (gastroesophageal reflux disease)     Hyperlipidemia     Hypertension     Hypertension     Primary osteoarthritis of left knee 10/14/2018    PVD (peripheral vascular disease) [facility])        Past Surgical History:  Past Surgical History:   Procedure Laterality Date    BACK SURGERY      COLONOSCOPY  2007?     MD COLONOSCOPY FLX DX W/COLLJ SPEC WHEN PFRMD N/A 9/11/2017    Dr Prabha Sands internal hemorrhoids, diverticular disease-HP-No recall (age)   [facility] JAYLEN/CARPAL

## 2018-10-18 VITALS
DIASTOLIC BLOOD PRESSURE: 74 MMHG | TEMPERATURE: 97.3 F | WEIGHT: 233 LBS | SYSTOLIC BLOOD PRESSURE: 143 MMHG | BODY MASS INDEX: 31.56 KG/M2 | OXYGEN SATURATION: 98 % | HEART RATE: 67 BPM | RESPIRATION RATE: 10 BRPM | HEIGHT: 72 IN

## 2018-10-18 LAB
ANION GAP SERPL CALCULATED.3IONS-SCNC: 12 MMOL/L (ref 7–19)
BUN BLDV-MCNC: 35 MG/DL (ref 8–23)
CALCIUM SERPL-MCNC: 8.2 MG/DL (ref 8.8–10.2)
CHLORIDE BLD-SCNC: 97 MMOL/L (ref 98–111)
CO2: 26 MMOL/L (ref 22–29)
CREAT SERPL-MCNC: 1.9 MG/DL (ref 0.5–1.2)
GFR NON-AFRICAN AMERICAN: 35
GLUCOSE BLD-MCNC: 146 MG/DL (ref 74–109)
GLUCOSE BLD-MCNC: 160 MG/DL (ref 70–99)
HCT VFR BLD CALC: 27.5 % (ref 42–52)
HEMOGLOBIN: 8.8 G/DL (ref 14–18)
PERFORMED ON: ABNORMAL
POTASSIUM REFLEX MAGNESIUM: 4.4 MMOL/L (ref 3.5–5)
SODIUM BLD-SCNC: 135 MMOL/L (ref 136–145)

## 2018-10-18 PROCEDURE — 85018 HEMOGLOBIN: CPT

## 2018-10-18 PROCEDURE — 6360000002 HC RX W HCPCS: Performed by: ORTHOPAEDIC SURGERY

## 2018-10-18 PROCEDURE — 6370000000 HC RX 637 (ALT 250 FOR IP): Performed by: FAMILY MEDICINE

## 2018-10-18 PROCEDURE — 6370000000 HC RX 637 (ALT 250 FOR IP): Performed by: ORTHOPAEDIC SURGERY

## 2018-10-18 PROCEDURE — 85014 HEMATOCRIT: CPT

## 2018-10-18 PROCEDURE — 97116 GAIT TRAINING THERAPY: CPT

## 2018-10-18 PROCEDURE — 6370000000 HC RX 637 (ALT 250 FOR IP): Performed by: PHYSICIAN ASSISTANT

## 2018-10-18 PROCEDURE — 82948 REAGENT STRIP/BLOOD GLUCOSE: CPT

## 2018-10-18 PROCEDURE — 80048 BASIC METABOLIC PNL TOTAL CA: CPT

## 2018-10-18 PROCEDURE — 36415 COLL VENOUS BLD VENIPUNCTURE: CPT

## 2018-10-18 RX ORDER — OXYCODONE HYDROCHLORIDE 10 MG/1
10 TABLET ORAL SEE ADMIN INSTRUCTIONS
Qty: 90 TABLET | Refills: 0 | Status: ON HOLD | OUTPATIENT
Start: 2018-10-18 | End: 2018-11-02

## 2018-10-18 RX ORDER — ONDANSETRON 4 MG/1
4 TABLET, FILM COATED ORAL EVERY 8 HOURS PRN
Qty: 30 TABLET | Refills: 0 | Status: SHIPPED | OUTPATIENT
Start: 2018-10-18 | End: 2018-12-11

## 2018-10-18 RX ADMIN — INSULIN LISPRO 1 UNITS: 100 INJECTION, SOLUTION INTRAVENOUS; SUBCUTANEOUS at 09:11

## 2018-10-18 RX ADMIN — OXYCODONE HYDROCHLORIDE 10 MG: 10 TABLET, FILM COATED, EXTENDED RELEASE ORAL at 09:30

## 2018-10-18 RX ADMIN — ACETAMINOPHEN 1000 MG: 500 TABLET, FILM COATED ORAL at 08:25

## 2018-10-18 RX ADMIN — APIXABAN 2.5 MG: 2.5 TABLET, FILM COATED ORAL at 09:35

## 2018-10-18 RX ADMIN — NIFEDIPINE 60 MG: 60 TABLET, FILM COATED, EXTENDED RELEASE ORAL at 09:34

## 2018-10-18 RX ADMIN — DOCUSATE SODIUM 100 MG: 100 CAPSULE, LIQUID FILLED ORAL at 09:33

## 2018-10-18 RX ADMIN — ALUMINUM HYDROXIDE, MAGNESIUM HYDROXIDE, AND SIMETHICONE 30 ML: 200; 200; 20 SUSPENSION ORAL at 08:01

## 2018-10-18 RX ADMIN — ONDANSETRON HYDROCHLORIDE 4 MG: 2 INJECTION, SOLUTION INTRAMUSCULAR; INTRAVENOUS at 03:19

## 2018-10-18 RX ADMIN — FAMOTIDINE 20 MG: 20 TABLET, FILM COATED ORAL at 09:35

## 2018-10-18 RX ADMIN — TAMSULOSIN HYDROCHLORIDE 0.4 MG: 0.4 CAPSULE ORAL at 09:34

## 2018-10-18 RX ADMIN — ATORVASTATIN CALCIUM 20 MG: 20 TABLET, FILM COATED ORAL at 09:33

## 2018-10-18 RX ADMIN — Medication 150 MG: at 09:33

## 2018-10-18 RX ADMIN — METOPROLOL SUCCINATE 100 MG: 50 TABLET, EXTENDED RELEASE ORAL at 09:34

## 2018-10-18 RX ADMIN — TRAMADOL HYDROCHLORIDE 50 MG: 50 TABLET, FILM COATED ORAL at 09:30

## 2018-10-18 RX ADMIN — ASPIRIN 81 MG 81 MG: 81 TABLET ORAL at 09:33

## 2018-10-18 ASSESSMENT — PAIN SCALES - GENERAL
PAINLEVEL_OUTOF10: 3
PAINLEVEL_OUTOF10: 4

## 2018-10-18 NOTE — PROGRESS NOTES
Patient discharged home today with [facility]. Facility notified of patient's discharge and all documents were faxed. P ; F .   Electronically signed by Donato Wilson RN on 10/18/2018 at 11:36 AM

## 2018-10-18 NOTE — PROGRESS NOTES
at 10/17/18 1018      iron polysaccharides  150 mg Oral BID    apixaban  2.5 mg Oral BID    insulin lispro  0-6 Units Subcutaneous TID WC    insulin lispro  0-3 Units Subcutaneous Nightly    famotidine  20 mg Oral BID    atorvastatin  20 mg Oral Daily    metoprolol succinate  100 mg Oral Daily    NIFEdipine  60 mg Oral Daily    sodium chloride flush  10 mL Intravenous 2 times per day    acetaminophen  1,000 mg Oral Q8H    docusate sodium  100 mg Oral BID    oxyCODONE  10 mg Oral 2 times per day    traMADol  50 mg Oral Q6H    aspirin  81 mg Oral Daily    tamsulosin  0.4 mg Oral Daily    dexamethasone  10 mg Intravenous Once       Data:     Code Status: Full Code    Family History   Problem Relation Age of Onset    Colon Cancer Father     Diabetes Brother     Colon Polyps Neg Hx     Liver Cancer Neg Hx     Liver Disease Neg Hx     Esophageal Cancer Neg Hx     Rectal Cancer Neg Hx     Stomach Cancer Neg Hx      Social History     Social History    Marital status:      Spouse name: N/A    Number of children: N/A    Years of education: N/A     Occupational History    Not on file.      Social History Main Topics    Smoking status: Former Smoker     Quit date: 6/3/2003    Smokeless tobacco: Never Used    Alcohol use 7.2 oz/week     12 Glasses of wine per week      Comment: 2 glasses of wine every night    Drug use: No    Sexual activity: Yes     Partners: Female     Other Topics Concern    Not on file     Social History Narrative    No narrative on file       Labs:  Hematology:  Recent Labs      10/16/18   0346  10/17/18   0241  10/18/18   0315   HGB  11.2*  8.3*  8.8*   HCT  33.4*  27.6*  27.5*     Chemistry:  Recent Labs      10/16/18   0346  10/17/18   0241  10/18/18   0315   NA  135*  136  135*   K  4.3  5.3*  4.4   CL  98  100  97*   CO2  24  23  26   GLUCOSE  232*  148*  146*   BUN  35*  34*  35*   CREATININE  1.6*  1.9*  1.9*   ANIONGAP  13  13  12   LABGLOM  42*  35*  35*

## 2018-10-18 NOTE — DISCHARGE SUMMARY
[facility] [address] [place] [address]  Discharge Summary    The Jesus Méndez is a 69 y.o. male underwent L TKA procedure without complication. Jesus Méndez was admitted to the floor following his   recovery in the PACU.      Discharge Diagnosis  left knee djd    Current Inpatient Medications    Current Facility-Administered Medications: iron polysaccharides (NIFEREX) capsule 150 mg, 150 mg, Oral, BID  apixaban (ELIQUIS) tablet 2.5 mg, 2.5 mg, Oral, BID  insulin lispro (HUMALOG) injection vial 0-6 Units, 0-6 Units, Subcutaneous, TID WC  insulin lispro (HUMALOG) injection vial 0-3 Units, 0-3 Units, Subcutaneous, Nightly  glucose (GLUTOSE) 40 % oral gel 15 g, 15 g, Oral, PRN  dextrose 50 % solution 12.5 g, 12.5 g, Intravenous, PRN  glucagon (rDNA) injection 1 mg, 1 mg, Intramuscular, PRN  dextrose 5 % solution, 100 mL/hr, Intravenous, PRN  famotidine (PEPCID) tablet 20 mg, 20 mg, Oral, BID  aluminum & magnesium hydroxide-simethicone (MAALOX) 200-200-20 MG/5ML suspension 30 mL, 30 mL, Oral, Q6H PRN  atorvastatin (LIPITOR) tablet 20 mg, 20 mg, Oral, Daily  metoprolol succinate (TOPROL XL) extended release tablet 100 mg, 100 mg, Oral, Daily  NIFEdipine (PROCARDIA XL) extended release tablet 60 mg, 60 mg, Oral, Daily  0.9 % sodium chloride infusion, , Intravenous, Continuous  0.9 % sodium chloride bolus, 500 mL, Intravenous, PRN  sodium chloride flush 0.9 % injection 10 mL, 10 mL, Intravenous, 2 times per day  sodium chloride flush 0.9 % injection 10 mL, 10 mL, Intravenous, PRN  acetaminophen (TYLENOL) tablet 1,000 mg, 1,000 mg, Oral, Q8H  oxyCODONE (ROXICODONE) immediate release tablet 5 mg, 5 mg, Oral, Q4H PRN **OR** oxyCODONE (ROXICODONE) immediate release tablet 10 mg, 10 mg, Oral, Q4H PRN  docusate sodium (COLACE) capsule 100 mg, 100 mg, Oral, BID  magnesium hydroxide (MILK OF MAGNESIA) 400 MG/5ML suspension 30 mL, 30 mL, Oral, Daily PRN  bisacodyl (DULCOLAX) suppository 10 mg, 10 mg, Rectal, Daily

## 2018-10-18 NOTE — PLAN OF CARE
Problem: Falls - Risk of:  Goal: Will remain free from falls  Will remain free from falls   Outcome: Ongoing    Goal: Absence of physical injury  Absence of physical injury   Outcome: Ongoing      Problem: Discharge Planning:  Goal: Discharged to appropriate level of care  Discharged to appropriate level of care   Outcome: Ongoing      Problem: Mobility - Impaired:  Goal: Mobility will improve  Mobility will improve   Outcome: Ongoing      Problem: Infection - Surgical Site:  Goal: Will show no infection signs and symptoms  Will show no infection signs and symptoms   Outcome: Ongoing      Problem: Pain - Acute:  Goal: Pain level will decrease  Pain level will decrease   Outcome: Ongoing

## 2018-10-18 NOTE — PROGRESS NOTES
famotidine (PEPCID) tablet 20 mg  20 mg Oral BID Lucinda Parra MD   20 mg at 10/18/18 0935    aluminum & magnesium hydroxide-simethicone (MAALOX) 200-200-20 MG/5ML suspension 30 mL  30 mL Oral Q6H PRN Joseph Ruelas MD   30 mL at 10/18/18 0801    atorvastatin (LIPITOR) tablet 20 mg  20 mg Oral Daily Joseph Ruelas MD   20 mg at 10/18/18 0933    metoprolol succinate (TOPROL XL) extended release tablet 100 mg  100 mg Oral Daily Joseph Ruelas MD   100 mg at 10/18/18 0934    NIFEdipine (PROCARDIA XL) extended release tablet 60 mg  60 mg Oral Daily Joseph Ruelas MD   60 mg at 10/18/18 0934    0.9 % sodium chloride infusion   Intravenous Continuous Ger Lamb  mL/hr at 10/17/18 1018      0.9 % sodium chloride bolus  500 mL Intravenous PRN Joseph Ruelas MD        sodium chloride flush 0.9 % injection 10 mL  10 mL Intravenous 2 times per day Joseph Ruelas MD        sodium chloride flush 0.9 % injection 10 mL  10 mL Intravenous PRN Joseph Ruelas MD        acetaminophen (TYLENOL) tablet 1,000 mg  1,000 mg Oral Q8H Joseph Ruelas MD   1,000 mg at 10/18/18 0825    oxyCODONE (ROXICODONE) immediate release tablet 5 mg  5 mg Oral Q4H PRN Joseph Ruelas MD   5 mg at 10/15/18 1825    Or    oxyCODONE (ROXICODONE) immediate release tablet 10 mg  10 mg Oral Q4H PRN Joseph Ruelas MD        docusate sodium (COLACE) capsule 100 mg  100 mg Oral BID Joseph Ruelas MD   100 mg at 10/18/18 0933    magnesium hydroxide (MILK OF MAGNESIA) 400 MG/5ML suspension 30 mL  30 mL Oral Daily PRN Joseph Ruelas MD   30 mL at 10/17/18 1349    bisacodyl (DULCOLAX) suppository 10 mg  10 mg Rectal Daily PRN Joseph Ruelas MD        ondansetron [facility]) injection 4 mg  4 mg Intravenous Q6H PRN Joseph Ruelas MD   4 mg at 10/18/18 0319    oxyCODONE (OXYCONTIN) extended release tablet 10 mg  10 mg Oral 2 times per day Joseph Ruelas MD   10 mg at 10/18/18 0930    oxyCODONE (ROXICODONE) immediate release Left 7/18/2018    OPEN CARPAL TUNNEL RELEASE performed by Lotus Verde MD at [address] Left 8/28/2018    LEFT CAROTID ENDARTERECTOMY WITH VEIN PATCH ANGIOPLASTY AND COMPLETION ANGIOGRAM performed by Khurram Du MD at [place] [id number] Left 10/15/2018    LEFT COMPLEX TOTAL KNEE ARTHROPLASTY performed by Lotus Verde MD at [facility] VASCULAR SURGERY  04/21/2015    Janyth Apley M. D. Ultrasound guided access of left common femoral artery. Aortogram.Diagnostic right lower extremity arteriogram.Radiologic supervision and interpretation.  VASCULAR SURGERY  01/13/2015    Janyth Apley M.D Atherectomy,angioplasty,and stenting of left superficial femoral artery.  VASCULAR SURGERY  03/11/2014    Janyth Apley M.D. Ultrasound-guided access of right common femoral artery. Aortogram.Left lower extremity arteriogram.Atherectomy and angioplasty of left superficial femoral artery. Radiologic supervision and interpretation.  VASCULAR SURGERY  01/18/2013    Janyth Apley M. D. Aortogram.Multistation arteriogram right lower extremity. Laser atherectomy and angioplasty of right superficial femoral artery. Selective catheterization of right tibioperoneal trunk. Angioplasty of peroneal artery and tibioperoneal trunk. Family History  Family History   Problem Relation Age of Onset    Colon Cancer Father     Diabetes Brother     Colon Polyps Neg Hx     Liver Cancer Neg Hx     Liver Disease Neg Hx     Esophageal Cancer Neg Hx     Rectal Cancer Neg Hx     Stomach Cancer Neg Hx        Social History  Social History     Social History    Marital status:      Spouse name: N/A    Number of children: N/A    Years of education: N/A     Occupational History    Not on file.      Social History Main Topics    Smoking status: Former Smoker     Quit date: 6/3/2003    Smokeless tobacco: Never Used    Alcohol use 7.2

## 2018-10-23 ENCOUNTER — APPOINTMENT (OUTPATIENT)
Dept: CT IMAGING | Age: 78
DRG: 871 | End: 2018-10-23
Payer: MEDICARE

## 2018-10-23 ENCOUNTER — APPOINTMENT (OUTPATIENT)
Dept: GENERAL RADIOLOGY | Age: 78
DRG: 871 | End: 2018-10-23
Payer: MEDICARE

## 2018-10-23 ENCOUNTER — HOSPITAL ENCOUNTER (INPATIENT)
Age: 78
LOS: 10 days | Discharge: NURSING, REHAB OR LONG-TERM CARE | DRG: 871 | End: 2018-11-02
Attending: EMERGENCY MEDICINE | Admitting: FAMILY MEDICINE
Payer: MEDICARE

## 2018-10-23 ENCOUNTER — APPOINTMENT (OUTPATIENT)
Dept: ULTRASOUND IMAGING | Age: 78
DRG: 871 | End: 2018-10-23
Payer: MEDICARE

## 2018-10-23 DIAGNOSIS — K72.00 SHOCK LIVER: ICD-10-CM

## 2018-10-23 DIAGNOSIS — N17.9 ACUTE RENAL FAILURE, UNSPECIFIED ACUTE RENAL FAILURE TYPE (HCC): ICD-10-CM

## 2018-10-23 DIAGNOSIS — I95.9 HYPOTENSION, UNSPECIFIED HYPOTENSION TYPE: ICD-10-CM

## 2018-10-23 DIAGNOSIS — R57.0 CARDIOGENIC SHOCK (HCC): Primary | ICD-10-CM

## 2018-10-23 DIAGNOSIS — M17.12 PRIMARY OSTEOARTHRITIS OF LEFT KNEE: ICD-10-CM

## 2018-10-23 PROBLEM — R77.8 ELEVATED TROPONIN: Status: ACTIVE | Noted: 2018-10-23

## 2018-10-23 PROBLEM — E87.5 HYPERKALEMIA: Status: ACTIVE | Noted: 2018-10-23

## 2018-10-23 PROBLEM — J96.01 ACUTE RESPIRATORY FAILURE WITH HYPOXIA (HCC): Status: ACTIVE | Noted: 2018-10-23

## 2018-10-23 PROBLEM — R31.0 GROSS HEMATURIA: Status: ACTIVE | Noted: 2018-10-23

## 2018-10-23 PROBLEM — R65.21 SEPTIC SHOCK (HCC): Status: ACTIVE | Noted: 2018-10-23

## 2018-10-23 PROBLEM — I50.9 CHF (CONGESTIVE HEART FAILURE) (HCC): Status: ACTIVE | Noted: 2018-10-23

## 2018-10-23 PROBLEM — E87.8 HYPOCHLOREMIA: Status: ACTIVE | Noted: 2018-10-23

## 2018-10-23 PROBLEM — D68.9 COAGULOPATHY (HCC): Status: ACTIVE | Noted: 2018-10-23

## 2018-10-23 PROBLEM — A41.9 SEPSIS (HCC): Status: ACTIVE | Noted: 2018-10-23

## 2018-10-23 PROBLEM — E87.20 METABOLIC ACIDOSIS: Status: ACTIVE | Noted: 2018-10-23

## 2018-10-23 PROBLEM — A41.9 SEPTIC SHOCK (HCC): Status: ACTIVE | Noted: 2018-10-23

## 2018-10-23 PROBLEM — J90 BILATERAL PLEURAL EFFUSION: Status: ACTIVE | Noted: 2018-10-23

## 2018-10-23 PROBLEM — R79.89 ELEVATED TROPONIN: Status: ACTIVE | Noted: 2018-10-23

## 2018-10-23 LAB
ALBUMIN SERPL-MCNC: 3 G/DL (ref 3.5–5.2)
ALBUMIN SERPL-MCNC: 3.5 G/DL (ref 3.5–5.2)
ALP BLD-CCNC: 176 U/L (ref 40–130)
ALP BLD-CCNC: 190 U/L (ref 40–130)
ALT SERPL-CCNC: 2287 U/L (ref 5–41)
ALT SERPL-CCNC: 4471 U/L (ref 5–41)
ANION GAP SERPL CALCULATED.3IONS-SCNC: 26 MMOL/L (ref 7–19)
ANION GAP SERPL CALCULATED.3IONS-SCNC: 31 MMOL/L (ref 7–19)
APTT: 38.5 SEC (ref 26–36.2)
AST SERPL-CCNC: 4211 U/L (ref 5–40)
AST SERPL-CCNC: >7000 U/L (ref 5–40)
BACTERIA: ABNORMAL /HPF
BANDED NEUTROPHILS RELATIVE PERCENT: 2 % (ref 0–5)
BASE EXCESS ARTERIAL: -13.9 MMOL/L (ref -2–2)
BASE EXCESS ARTERIAL: -16.2 MMOL/L (ref -2–2)
BASE EXCESS ARTERIAL: -20.4 MMOL/L (ref -2–2)
BASOPHILS ABSOLUTE: 0 K/UL (ref 0–0.2)
BASOPHILS ABSOLUTE: 0.1 K/UL (ref 0–0.2)
BASOPHILS RELATIVE PERCENT: 0.4 % (ref 0–1)
BASOPHILS RELATIVE PERCENT: 0.7 % (ref 0–1)
BILIRUB SERPL-MCNC: 3.8 MG/DL (ref 0.2–1.2)
BILIRUB SERPL-MCNC: 4.5 MG/DL (ref 0.2–1.2)
BILIRUBIN URINE: NEGATIVE
BLOOD, URINE: ABNORMAL
BUN BLDV-MCNC: 47 MG/DL (ref 8–23)
BUN BLDV-MCNC: 49 MG/DL (ref 8–23)
CALCIUM SERPL-MCNC: 8 MG/DL (ref 8.8–10.2)
CALCIUM SERPL-MCNC: 9 MG/DL (ref 8.8–10.2)
CARBOXYHEMOGLOBIN ARTERIAL: 2.1 % (ref 0–5)
CARBOXYHEMOGLOBIN ARTERIAL: 2.1 % (ref 0–5)
CARBOXYHEMOGLOBIN ARTERIAL: 2.2 % (ref 0–5)
CHLORIDE BLD-SCNC: 90 MMOL/L (ref 98–111)
CHLORIDE BLD-SCNC: 93 MMOL/L (ref 98–111)
CLARITY: ABNORMAL
CO2: 13 MMOL/L (ref 22–29)
CO2: 19 MMOL/L (ref 22–29)
COLOR: YELLOW
CREAT SERPL-MCNC: 3.8 MG/DL (ref 0.5–1.2)
CREAT SERPL-MCNC: 4.3 MG/DL (ref 0.5–1.2)
EOSINOPHILS ABSOLUTE: 0 K/UL (ref 0–0.6)
EOSINOPHILS ABSOLUTE: 0 K/UL (ref 0–0.6)
EOSINOPHILS RELATIVE PERCENT: 0 % (ref 0–5)
EOSINOPHILS RELATIVE PERCENT: 0 % (ref 0–5)
EPITHELIAL CELLS, UA: 5 /HPF (ref 0–5)
GFR NON-AFRICAN AMERICAN: 13
GFR NON-AFRICAN AMERICAN: 16
GLUCOSE BLD-MCNC: 191 MG/DL (ref 74–109)
GLUCOSE BLD-MCNC: 340 MG/DL (ref 74–109)
GLUCOSE URINE: NEGATIVE MG/DL
HCO3 ARTERIAL: 11.6 MMOL/L (ref 22–26)
HCO3 ARTERIAL: 12.8 MMOL/L (ref 22–26)
HCO3 ARTERIAL: 9 MMOL/L (ref 22–26)
HCT VFR BLD CALC: 26.9 % (ref 42–52)
HCT VFR BLD CALC: 28 % (ref 42–52)
HEMOGLOBIN, ART, EXTENDED: 8.3 G/DL (ref 14–18)
HEMOGLOBIN, ART, EXTENDED: 8.5 G/DL (ref 14–18)
HEMOGLOBIN, ART, EXTENDED: 8.7 G/DL (ref 14–18)
HEMOGLOBIN: 7.6 G/DL (ref 14–18)
HEMOGLOBIN: 8.6 G/DL (ref 14–18)
HYALINE CASTS: >87 /HPF (ref 0–8)
INR BLD: 2.82 (ref 0.88–1.18)
KETONES, URINE: NEGATIVE MG/DL
LACTIC ACID, SEPSIS: 13.1 MG/DL (ref 0.5–1.9)
LACTIC ACID, SEPSIS: 17.6 MG/DL (ref 0.5–1.9)
LACTIC ACID: 12.9 MMOL/L (ref 0.5–1.9)
LACTIC ACID: 18.4 MMOL/L (ref 0.5–1.9)
LEUKOCYTE ESTERASE, URINE: NEGATIVE
LIPASE: 72 U/L (ref 13–60)
LV EF: 50 %
LVEF MODALITY: NORMAL
LYMPHOCYTES ABSOLUTE: 2.2 K/UL (ref 1.1–4.5)
LYMPHOCYTES ABSOLUTE: 3.9 K/UL (ref 1.1–4.5)
LYMPHOCYTES RELATIVE PERCENT: 13.5 % (ref 20–40)
LYMPHOCYTES RELATIVE PERCENT: 16 % (ref 20–40)
MCH RBC QN AUTO: 28.8 PG (ref 27–31)
MCH RBC QN AUTO: 29.4 PG (ref 27–31)
MCHC RBC AUTO-ENTMCNC: 28.3 G/DL (ref 33–37)
MCHC RBC AUTO-ENTMCNC: 30.7 G/DL (ref 33–37)
MCV RBC AUTO: 101.9 FL (ref 80–94)
MCV RBC AUTO: 95.6 FL (ref 80–94)
METHEMOGLOBIN ARTERIAL: 0.5 %
METHEMOGLOBIN ARTERIAL: 0.7 %
METHEMOGLOBIN ARTERIAL: 0.7 %
MONOCYTES ABSOLUTE: 1.7 K/UL (ref 0–0.9)
MONOCYTES ABSOLUTE: 3.2 K/UL (ref 0–0.9)
MONOCYTES RELATIVE PERCENT: 10.3 % (ref 0–10)
MONOCYTES RELATIVE PERCENT: 13 % (ref 0–10)
NEUTROPHILS ABSOLUTE: 11.5 K/UL (ref 1.5–7.5)
NEUTROPHILS ABSOLUTE: 17.3 K/UL (ref 1.5–7.5)
NEUTROPHILS MANUAL: 69 %
NEUTROPHILS RELATIVE PERCENT: 68.9 % (ref 50–65)
NEUTROPHILS RELATIVE PERCENT: 69 % (ref 50–65)
NITRITE, URINE: NEGATIVE
O2 CONTENT ARTERIAL: 11.4 ML/DL
O2 CONTENT ARTERIAL: 11.7 ML/DL
O2 CONTENT ARTERIAL: 12.1 ML/DL
O2 SAT, ARTERIAL: 93.7 %
O2 SAT, ARTERIAL: 96.5 %
O2 SAT, ARTERIAL: 97.2 %
O2 THERAPY: ABNORMAL
PARATHYROID HORMONE INTACT: 189.9 PG/ML (ref 15–65)
PCO2 ARTERIAL: 32 MMHG (ref 35–45)
PCO2 ARTERIAL: 34 MMHG (ref 35–45)
PCO2 ARTERIAL: 34 MMHG (ref 35–45)
PDW BLD-RTO: 13.3 % (ref 11.5–14.5)
PDW BLD-RTO: 13.6 % (ref 11.5–14.5)
PH ARTERIAL: 7.03 (ref 7.35–7.45)
PH ARTERIAL: 7.14 (ref 7.35–7.45)
PH ARTERIAL: 7.21 (ref 7.35–7.45)
PH UA: 7
PLATELET # BLD: 286 K/UL (ref 130–400)
PLATELET # BLD: 295 K/UL (ref 130–400)
PLATELET SLIDE REVIEW: ABNORMAL
PMV BLD AUTO: 11.4 FL (ref 9.4–12.4)
PMV BLD AUTO: 11.7 FL (ref 9.4–12.4)
PO2 ARTERIAL: 131 MMHG (ref 80–100)
PO2 ARTERIAL: 149 MMHG (ref 80–100)
PO2 ARTERIAL: 94 MMHG (ref 80–100)
POIKILOCYTES: ABNORMAL
POTASSIUM REFLEX MAGNESIUM: 5.7 MMOL/L (ref 3.5–5)
POTASSIUM SERPL-SCNC: 5.2 MMOL/L (ref 3.5–5)
POTASSIUM, WHOLE BLOOD: 4.8
POTASSIUM, WHOLE BLOOD: 5.1
POTASSIUM, WHOLE BLOOD: 5.6
PRO-BNP: ABNORMAL PG/ML (ref 0–1800)
PROTEIN UA: 300 MG/DL
PROTHROMBIN TIME: 29.8 SEC (ref 12–14.6)
RBC # BLD: 2.64 M/UL (ref 4.7–6.1)
RBC # BLD: 2.93 M/UL (ref 4.7–6.1)
RBC UA: >900 /HPF (ref 0–4)
RBC UA: ABNORMAL /HPF (ref 0–2)
SODIUM BLD-SCNC: 134 MMOL/L (ref 136–145)
SODIUM BLD-SCNC: 138 MMOL/L (ref 136–145)
SPECIFIC GRAVITY UA: 1
TOTAL CK: 541 U/L (ref 39–308)
TOTAL PROTEIN: 5.5 G/DL (ref 6.6–8.7)
TOTAL PROTEIN: 6.2 G/DL (ref 6.6–8.7)
TROPONIN: 2.31 NG/ML (ref 0–0.03)
TROPONIN: 2.85 NG/ML (ref 0–0.03)
TROPONIN: 3.33 NG/ML (ref 0–0.03)
URINE REFLEX TO CULTURE: ABNORMAL
UROBILINOGEN, URINE: 0.2 E.U./DL
VITAMIN D 25-HYDROXY: 28.9 NG/ML
WBC # BLD: 16.6 K/UL (ref 4.8–10.8)
WBC # BLD: 24.4 K/UL (ref 4.8–10.8)
WBC UA: 20 /HPF (ref 0–5)

## 2018-10-23 PROCEDURE — 82803 BLOOD GASES ANY COMBINATION: CPT

## 2018-10-23 PROCEDURE — 76770 US EXAM ABDO BACK WALL COMP: CPT

## 2018-10-23 PROCEDURE — 96375 TX/PRO/DX INJ NEW DRUG ADDON: CPT

## 2018-10-23 PROCEDURE — 82306 VITAMIN D 25 HYDROXY: CPT

## 2018-10-23 PROCEDURE — 71275 CT ANGIOGRAPHY CHEST: CPT

## 2018-10-23 PROCEDURE — 2580000003 HC RX 258: Performed by: INTERNAL MEDICINE

## 2018-10-23 PROCEDURE — 6360000002 HC RX W HCPCS: Performed by: EMERGENCY MEDICINE

## 2018-10-23 PROCEDURE — 81001 URINALYSIS AUTO W/SCOPE: CPT

## 2018-10-23 PROCEDURE — 2580000003 HC RX 258: Performed by: EMERGENCY MEDICINE

## 2018-10-23 PROCEDURE — 82550 ASSAY OF CK (CPK): CPT

## 2018-10-23 PROCEDURE — 36556 INSERT NON-TUNNEL CV CATH: CPT | Performed by: EMERGENCY MEDICINE

## 2018-10-23 PROCEDURE — 99291 CRITICAL CARE FIRST HOUR: CPT | Performed by: EMERGENCY MEDICINE

## 2018-10-23 PROCEDURE — 6370000000 HC RX 637 (ALT 250 FOR IP): Performed by: FAMILY MEDICINE

## 2018-10-23 PROCEDURE — 6360000004 HC RX CONTRAST MEDICATION: Performed by: EMERGENCY MEDICINE

## 2018-10-23 PROCEDURE — 87040 BLOOD CULTURE FOR BACTERIA: CPT

## 2018-10-23 PROCEDURE — 2700000000 HC OXYGEN THERAPY PER DAY

## 2018-10-23 PROCEDURE — 99223 1ST HOSP IP/OBS HIGH 75: CPT | Performed by: INTERNAL MEDICINE

## 2018-10-23 PROCEDURE — 36600 WITHDRAWAL OF ARTERIAL BLOOD: CPT

## 2018-10-23 PROCEDURE — 83605 ASSAY OF LACTIC ACID: CPT

## 2018-10-23 PROCEDURE — 80053 COMPREHEN METABOLIC PANEL: CPT

## 2018-10-23 PROCEDURE — C9113 INJ PANTOPRAZOLE SODIUM, VIA: HCPCS | Performed by: FAMILY MEDICINE

## 2018-10-23 PROCEDURE — 85730 THROMBOPLASTIN TIME PARTIAL: CPT

## 2018-10-23 PROCEDURE — 0JH63XZ INSERTION OF TUNNELED VASCULAR ACCESS DEVICE INTO CHEST SUBCUTANEOUS TISSUE AND FASCIA, PERCUTANEOUS APPROACH: ICD-10-PCS | Performed by: EMERGENCY MEDICINE

## 2018-10-23 PROCEDURE — 6360000002 HC RX W HCPCS: Performed by: FAMILY MEDICINE

## 2018-10-23 PROCEDURE — 71045 X-RAY EXAM CHEST 1 VIEW: CPT

## 2018-10-23 PROCEDURE — 83690 ASSAY OF LIPASE: CPT

## 2018-10-23 PROCEDURE — 94660 CPAP INITIATION&MGMT: CPT

## 2018-10-23 PROCEDURE — 6360000002 HC RX W HCPCS

## 2018-10-23 PROCEDURE — 2500000003 HC RX 250 WO HCPCS: Performed by: EMERGENCY MEDICINE

## 2018-10-23 PROCEDURE — 06HY33Z INSERTION OF INFUSION DEVICE INTO LOWER VEIN, PERCUTANEOUS APPROACH: ICD-10-PCS | Performed by: EMERGENCY MEDICINE

## 2018-10-23 PROCEDURE — 99291 CRITICAL CARE FIRST HOUR: CPT

## 2018-10-23 PROCEDURE — 99292 CRITICAL CARE ADDL 30 MIN: CPT | Performed by: FAMILY MEDICINE

## 2018-10-23 PROCEDURE — 93306 TTE W/DOPPLER COMPLETE: CPT

## 2018-10-23 PROCEDURE — 84132 ASSAY OF SERUM POTASSIUM: CPT

## 2018-10-23 PROCEDURE — 85025 COMPLETE CBC W/AUTO DIFF WBC: CPT

## 2018-10-23 PROCEDURE — 96374 THER/PROPH/DIAG INJ IV PUSH: CPT

## 2018-10-23 PROCEDURE — 2500000003 HC RX 250 WO HCPCS: Performed by: FAMILY MEDICINE

## 2018-10-23 PROCEDURE — 2100000000 HC CCU R&B

## 2018-10-23 PROCEDURE — 99291 CRITICAL CARE FIRST HOUR: CPT | Performed by: FAMILY MEDICINE

## 2018-10-23 PROCEDURE — 36415 COLL VENOUS BLD VENIPUNCTURE: CPT

## 2018-10-23 PROCEDURE — 2500000003 HC RX 250 WO HCPCS: Performed by: INTERNAL MEDICINE

## 2018-10-23 PROCEDURE — 83970 ASSAY OF PARATHORMONE: CPT

## 2018-10-23 PROCEDURE — 84484 ASSAY OF TROPONIN QUANT: CPT

## 2018-10-23 PROCEDURE — 93005 ELECTROCARDIOGRAM TRACING: CPT

## 2018-10-23 PROCEDURE — 85610 PROTHROMBIN TIME: CPT

## 2018-10-23 PROCEDURE — 2580000003 HC RX 258: Performed by: FAMILY MEDICINE

## 2018-10-23 PROCEDURE — 74177 CT ABD & PELVIS W/CONTRAST: CPT

## 2018-10-23 PROCEDURE — 83880 ASSAY OF NATRIURETIC PEPTIDE: CPT

## 2018-10-23 PROCEDURE — 99223 1ST HOSP IP/OBS HIGH 75: CPT | Performed by: FAMILY MEDICINE

## 2018-10-23 PROCEDURE — 76937 US GUIDE VASCULAR ACCESS: CPT | Performed by: EMERGENCY MEDICINE

## 2018-10-23 PROCEDURE — B54CZZA ULTRASONOGRAPHY OF LEFT LOWER EXTREMITY VEINS, GUIDANCE: ICD-10-PCS | Performed by: EMERGENCY MEDICINE

## 2018-10-23 RX ORDER — PANTOPRAZOLE SODIUM 40 MG/10ML
40 INJECTION, POWDER, LYOPHILIZED, FOR SOLUTION INTRAVENOUS DAILY
Status: DISCONTINUED | OUTPATIENT
Start: 2018-10-23 | End: 2018-11-01

## 2018-10-23 RX ORDER — SODIUM CHLORIDE 9 MG/ML
INJECTION, SOLUTION INTRAVENOUS CONTINUOUS
Status: DISCONTINUED | OUTPATIENT
Start: 2018-10-23 | End: 2018-10-24

## 2018-10-23 RX ORDER — 0.9 % SODIUM CHLORIDE 0.9 %
500 INTRAVENOUS SOLUTION INTRAVENOUS ONCE
Status: COMPLETED | OUTPATIENT
Start: 2018-10-23 | End: 2018-10-24

## 2018-10-23 RX ORDER — 0.9 % SODIUM CHLORIDE 0.9 %
1000 INTRAVENOUS SOLUTION INTRAVENOUS ONCE
Status: COMPLETED | OUTPATIENT
Start: 2018-10-23 | End: 2018-10-23

## 2018-10-23 RX ORDER — 0.9 % SODIUM CHLORIDE 0.9 %
1000 INTRAVENOUS SOLUTION INTRAVENOUS ONCE
Status: DISCONTINUED | OUTPATIENT
Start: 2018-10-23 | End: 2018-10-23

## 2018-10-23 RX ORDER — VANCOMYCIN HYDROCHLORIDE 1 G/200ML
1000 INJECTION, SOLUTION INTRAVENOUS ONCE
Status: COMPLETED | OUTPATIENT
Start: 2018-10-23 | End: 2018-10-23

## 2018-10-23 RX ORDER — DOPAMINE HYDROCHLORIDE 160 MG/100ML
INJECTION, SOLUTION INTRAVENOUS
Status: COMPLETED
Start: 2018-10-23 | End: 2018-10-23

## 2018-10-23 RX ORDER — FUROSEMIDE 10 MG/ML
40 INJECTION INTRAMUSCULAR; INTRAVENOUS ONCE
Status: COMPLETED | OUTPATIENT
Start: 2018-10-23 | End: 2018-10-23

## 2018-10-23 RX ORDER — SODIUM CHLORIDE 0.9 % (FLUSH) 0.9 %
10 SYRINGE (ML) INJECTION EVERY 12 HOURS SCHEDULED
Status: DISCONTINUED | OUTPATIENT
Start: 2018-10-23 | End: 2018-10-24

## 2018-10-23 RX ORDER — SODIUM POLYSTYRENE SULFONATE 15 G/60ML
15 SUSPENSION ORAL; RECTAL ONCE
Status: DISCONTINUED | OUTPATIENT
Start: 2018-10-23 | End: 2018-10-28

## 2018-10-23 RX ORDER — SODIUM CHLORIDE 0.9 % (FLUSH) 0.9 %
10 SYRINGE (ML) INJECTION PRN
Status: DISCONTINUED | OUTPATIENT
Start: 2018-10-23 | End: 2018-10-24

## 2018-10-23 RX ORDER — 0.9 % SODIUM CHLORIDE 0.9 %
10 VIAL (ML) INJECTION DAILY
Status: DISCONTINUED | OUTPATIENT
Start: 2018-10-23 | End: 2018-11-01

## 2018-10-23 RX ORDER — DILTIAZEM HYDROCHLORIDE 5 MG/ML
10 INJECTION INTRAVENOUS ONCE
Status: COMPLETED | OUTPATIENT
Start: 2018-10-23 | End: 2018-10-23

## 2018-10-23 RX ORDER — ONDANSETRON 2 MG/ML
INJECTION INTRAMUSCULAR; INTRAVENOUS
Status: COMPLETED
Start: 2018-10-23 | End: 2018-10-23

## 2018-10-23 RX ORDER — DOPAMINE HYDROCHLORIDE 160 MG/100ML
5 INJECTION, SOLUTION INTRAVENOUS CONTINUOUS
Status: DISCONTINUED | OUTPATIENT
Start: 2018-10-23 | End: 2018-10-28

## 2018-10-23 RX ORDER — SODIUM POLYSTYRENE SULFONATE 15 G/60ML
15 SUSPENSION ORAL; RECTAL ONCE
Status: COMPLETED | OUTPATIENT
Start: 2018-10-23 | End: 2018-10-23

## 2018-10-23 RX ADMIN — Medication 10 ML: at 20:59

## 2018-10-23 RX ADMIN — NOREPINEPHRINE BITARTRATE 5 MCG/MIN: 1 INJECTION INTRAVENOUS at 05:56

## 2018-10-23 RX ADMIN — SODIUM CHLORIDE 1000 ML: 9 INJECTION, SOLUTION INTRAVENOUS at 10:36

## 2018-10-23 RX ADMIN — PANTOPRAZOLE SODIUM 40 MG: 40 INJECTION, POWDER, FOR SOLUTION INTRAVENOUS at 13:07

## 2018-10-23 RX ADMIN — SODIUM CHLORIDE 1000 ML: 9 INJECTION, SOLUTION INTRAVENOUS at 10:35

## 2018-10-23 RX ADMIN — VANCOMYCIN HYDROCHLORIDE 1000 MG: 1 INJECTION, SOLUTION INTRAVENOUS at 07:20

## 2018-10-23 RX ADMIN — DILTIAZEM HYDROCHLORIDE 10 MG: 5 INJECTION INTRAVENOUS at 04:25

## 2018-10-23 RX ADMIN — DOPAMINE HYDROCHLORIDE 17.5 MCG/KG/MIN: 160 INJECTION, SOLUTION INTRAVENOUS at 20:44

## 2018-10-23 RX ADMIN — Medication 2 G: at 07:20

## 2018-10-23 RX ADMIN — Medication 10 ML: at 13:09

## 2018-10-23 RX ADMIN — Medication: at 21:22

## 2018-10-23 RX ADMIN — DOPAMINE HYDROCHLORIDE 5 MCG/KG/MIN: 160 INJECTION, SOLUTION INTRAVENOUS at 07:09

## 2018-10-23 RX ADMIN — Medication 10 ML: at 13:08

## 2018-10-23 RX ADMIN — VASOPRESSIN 0.1 UNITS/MIN: 20 INJECTION INTRAVENOUS at 20:55

## 2018-10-23 RX ADMIN — ONDANSETRON 4 MG: 2 INJECTION INTRAMUSCULAR; INTRAVENOUS at 04:35

## 2018-10-23 RX ADMIN — VASOPRESSIN 0.03 UNITS/MIN: 20 INJECTION INTRAVENOUS at 13:08

## 2018-10-23 RX ADMIN — SODIUM CHLORIDE 1000 ML: 9 INJECTION, SOLUTION INTRAVENOUS at 04:33

## 2018-10-23 RX ADMIN — IOPAMIDOL 90 ML: 755 INJECTION, SOLUTION INTRAVENOUS at 05:22

## 2018-10-23 RX ADMIN — SODIUM POLYSTYRENE SULFONATE 15 G: 15 SUSPENSION ORAL; RECTAL at 20:48

## 2018-10-23 RX ADMIN — Medication: at 13:08

## 2018-10-23 RX ADMIN — SODIUM CHLORIDE: 9 INJECTION, SOLUTION INTRAVENOUS at 09:00

## 2018-10-23 RX ADMIN — FUROSEMIDE 40 MG: 10 INJECTION, SOLUTION INTRAMUSCULAR; INTRAVENOUS at 20:51

## 2018-10-23 ASSESSMENT — ENCOUNTER SYMPTOMS
BACK PAIN: 0
VOMITING: 0
COUGH: 0
SHORTNESS OF BREATH: 0
NAUSEA: 0
DIARRHEA: 0
SORE THROAT: 0
ABDOMINAL PAIN: 0
RHINORRHEA: 0

## 2018-10-23 ASSESSMENT — PAIN SCALES - GENERAL
PAINLEVEL_OUTOF10: 2
PAINLEVEL_OUTOF10: 0

## 2018-10-23 NOTE — ED NOTES
Pt noted to have increased work of breathing, resp rate increasing.      Meagan Higuera, RN  10/23/18 6063

## 2018-10-23 NOTE — CONSULTS
% 250 mL infusion, 5 mcg/min, Intravenous, Continuous, Mya Friedman MD, Last Rate: 28.1 mL/hr at 10/23/18 0900, 30 mcg/min at 10/23/18 0900  DOPamine (INTROPIN) 400 mg in dextrose 5 % 250 mL infusion, 5 mcg/kg/min, Intravenous, Continuous, Mya Friedman MD, Last Rate: 65.5 mL/hr at 10/23/18 1058, 17.5 mcg/kg/min at 10/23/18 1058  sodium chloride flush 0.9 % injection 10 mL, 10 mL, Intravenous, 2 times per day, Tiff Been, DO  sodium chloride flush 0.9 % injection 10 mL, 10 mL, Intravenous, PRN, Steve Bevel Orantes, DO  0.9 % sodium chloride infusion, , Intravenous, Continuous, Thang Orantes, DO  pantoprazole (PROTONIX) injection 40 mg, 40 mg, Intravenous, Daily, Tiff Been, DO    And  sodium chloride (PF) 0.9 % injection 10 mL, 10 mL, Intravenous, Daily, Steve Bevel Orantes, DO  sodium bicarbonate 150 mEq in dextrose 5 % 1,000 mL infusion, , Intravenous, Continuous, Thang Orantes, DO  vasopressin 20 Units in dextrose 5 % 100 mL infusion, 0.03 Units/min, Intravenous, Continuous, Tiff Been, DO  (START ON 10/24/2018) ceFEPIme (MAXIPIME) 2 g in sodium chloride (PF) 20 mL IV syringe, 2 g, Intravenous, Q24H, Tiff Been, DO        Past Medical History:  Past Medical History:  10/23/2018: Acute liver failure without hepatic coma  No date: Back pain      Comment: \"with tired legs as a result\"  No date: Blood circulation, collateral  No date: Carotid arterial disease ([facility] [address]      Comment: recent surgery  10/23/2018: CHF (congestive heart failure) ([facility] [address]  10/15/2018: CKD (chronic kidney disease), stage II  No date: GERD (gastroesophageal reflux disease)  No date: Hyperlipidemia  No date: Hypertension  No date: Hypertension  10/14/2018: Primary osteoarthritis of left knee  No date: PVD (peripheral vascular disease) ([facility] [address]    Past Surgical History:  Past Surgical History:  No date: BACK SURGERY  2007?: COLONOSCOPY  9/11/2017: TX COLONOSCOPY FLX DX W/COLLJ SPEC WHEN PFRMD N/A      Comment: Dr Moises Prabhakar internal hemorrhoids,                diverticular disease-HP-No recall (age)  7/18/2018: CO REVISE MEDIAN N/CARPAL TUNNEL SURG Left      Comment: OPEN CARPAL TUNNEL RELEASE performed by Andie Wang MD at [address]  8/28/2018: CO [facility] INCIS Left      Comment: LEFT CAROTID ENDARTERECTOMY WITH VEIN PATCH                ANGIOPLASTY AND COMPLETION ANGIOGRAM performed                by Crystal Walton MD at [id number]  10/15/2018: CO TOTAL KNEE ARTHROPLASTY Left      Comment: LEFT COMPLEX TOTAL KNEE ARTHROPLASTY performed               by Carlos Alberto Ruiz MD at [address] [facility]  No date: Elnita Dy  04/21/2015: VASCULAR SURGERY      Comment: Vane BESS Ultrasound guided access of                left common femoral artery. Aortogram.Diagnostic               right lower extremity arteriogram.Radiologic                supervision and interpretation. 01/13/2015: VASCULAR SURGERY      Comment: Vane Ragsdale M.D Atherectomy,angioplasty,and                stenting of left superficial femoral artery. 03/11/2014: VASCULAR SURGERY      Comment: Vane Ragsdale M.D. Ultrasound-guided access of               right common femoral artery. Aortogram.Left                lower extremity arteriogram.Atherectomy and                angioplasty of left superficial femoral                artery. Radiologic supervision and                interpretation. 01/18/2013: VASCULAR SURGERY      Comment: Vane BESS Aortogram.Multistation                arteriogram right lower extremity. Laser                atherectomy and angioplasty of right                superficial femoral artery. Selective                catheterization of right tibioperoneal                trunk. Angioplasty of peroneal artery and                tibioperoneal trunk.     Family History  Review of patient's family history indicates:  Problem: Colon Cancer      Relation: Father       Age of Onset: (Not Specified)   Problem: Diabetes      Relation: Brother       Age of Onset: %.  Intake/Output Summary (Last 24 hours) at 10/23/18 1138  Last data filed at 10/23/18 1036          Gross per 24 hour  Intake:            465.9 ml  Output:              350 ml  Net   :            115.9 ml  General: Lethargic, exhausted and currently on Ventimask. HEENT: Normal cephalic atraumatic head  Neck: Supple with no JVD or carotid bruits. Chest:  clear to auscultation bilaterally without respiratory distress/decreased breath sounds at the bases  CVS: regular rate and rhythm  Abdominal: soft, nontender, normal bowel sounds  Extremities: no cyanosis or edema, right knee swelling related to recent surgery  Skin: warm and dry without rash      Labs:  BMP:                 10/23/18     10/23/18     10/23/18                       0420          0542          0857          NA           138           --           --           K            5.2*         5.1          4.8           CL           93*           --           --           CO2          19*           --           --           BUN          47*           --           --           CREATININE   3.8*          --           --           CALCIUM      9.0           --           --           CBC:                 10/23/18     10/23/18                       0420          1016          WBC          16.6*        24.4*         HGB          8.6*         7.6*          HCT          28.0*        26.9*         MCV          95.6*        101.9*        PLT          295          286           LIVER PROFILE:                 10/23/18                       0420          AST          4,211*        ALT          2,287*        LIPASE       72*           BILITOT      3.8*          ALKPHOS      190*          PT/INR:                 10/23/18                       0420          PROTIME      29.8*         INR          2.82*         APTT:                 10/23/18                       0420          APTT         38.5*         BNP:  No results for input(s): BNP in the last 72 hours.   Ionized unremarkable. There is moderate diffuse thickening of the gallbladder wall with small amount of fluid in the gallbladder fossa. No radiopaque calculi are seen. The common bile duct is not dilated. The pancreas appear normal. The pancreatic duct is not dilated. The adrenal glands bilaterally appear normal. The kidneys are unremarkable. There is bilateral perirenal fat infiltration. No evidence of hydronephrosis. The ureters are not optimally visualized. The urinary bladder is decompressed. There is an apparent soft tissue density located posteriorly at the base of the urinary bladder which may be extrinsic pressure of the enlarged prostate? Burnice Sinning This may be further evaluated. The prostate is moderately enlarged. There are small fat-containing inguinal hernias bilaterally. The stomach appears normal. Normal small bowel is seen. Appendix is not visualized. There are no finding to suggest appendicitis. Moderate gas and stool are seen in the colon. There are severe atheromatous changes of the abdominal aorta and iliac arteries. No aneurysmal dilatation or dissection. There is a mild retroperitoneal para-aortic lymphadenopathy. There is a small amount of ascites particularly in the right upper abdomen in the paracolic gutter. Severe chronic degenerative changes of the lumbar spine are seen with a mild levoscoliosis. A small amount of abdominal ascites. Moderate thickening of the wall of the gallbladder with a fluid in the gallbladder fossa may represent acute cholecystitis. No gallstones are noted. This may be clinically correlated and further evaluated. The urinary bladder is decompressed. There is an apparent soft tissue density located posteriorly at the floor of the urinary bladder which may be extrinsic pressure from the enlarged prostate. This may be clinically correlated. A small bibasilar pleural effusion, more on the right side. The severe atheromatous changes of the abdominal aorta and iliac arteries.  The above

## 2018-10-23 NOTE — CONSULTS
bilaterally appear normal. The kidneys are unremarkable. There is bilateral perirenal fat infiltration. No evidence of hydronephrosis. The ureters are not optimally visualized. The urinary bladder is decompressed. There is an apparent soft tissue density located posteriorly at the base of the urinary bladder which may be extrinsic pressure of the enlarged prostate? Roe Gardiner This may be further evaluated. The prostate is moderately enlarged. There are small fat-containing inguinal hernias bilaterally. The stomach appears normal. Normal small bowel is seen. Appendix is not visualized. There are no finding to suggest appendicitis. Moderate gas and stool are seen in the colon. There are severe atheromatous changes of the abdominal aorta and iliac arteries. No aneurysmal dilatation or dissection. There is a mild retroperitoneal para-aortic lymphadenopathy. There is a small amount of ascites particularly in the right upper abdomen in the paracolic gutter. Severe chronic degenerative changes of the lumbar spine are seen with a mild levoscoliosis. A small amount of abdominal ascites. Moderate thickening of the wall of the gallbladder with a fluid in the gallbladder fossa may represent acute cholecystitis. No gallstones are noted. This may be clinically correlated and further evaluated. The urinary bladder is decompressed. There is an apparent soft tissue density located posteriorly at the floor of the urinary bladder which may be extrinsic pressure from the enlarged prostate. This may be clinically correlated. A small bibasilar pleural effusion, more on the right side. The severe atheromatous changes of the abdominal aorta and iliac arteries. The above study was initially reviewed and reported by [facility]. I do not find any discrepancies. Signed by Dr Jesus Burton on 10/23/2018 7:38 AM    Xr Chest Portable    Result Date: 10/23/2018  Examination. XR CHEST PORTABLE History: CVC insertion.  A frontal portable upright view of the chest is compared with the previous study obtained earlier today. No interval change. There is persistent pulmonary vascular congestion and pulmonary edema. There are atelectatic changes in the right lower lung. There is moderate cardiomegaly. Atheromatous changes of thoracic aorta are noted. No pneumothorax. Postsurgical changes of the right shoulder are noted. No change. Persistent pulmonary vascular congestion and pulmonary edema. Discoid atelectasis right lower lung. No pneumothorax. The above finding are recorded on a digital voice clip in PACS. Signed by Dr Luz Bryant on 10/23/2018 7:45 AM    Xr Chest Portable    Result Date: 10/23/2018  EXAMINATION: XR CHEST PORTABLE 10/23/2018 7:17 AM HISTORY: Irregular heart rate COMPARISON: 8/20/2018 FINDINGS: The heart is magnified but felt to be normal in size. The aorta is tortuous with atherosclerotic calcifications. The pulmonary vasculature is indistinct, and there are subtle perihilar opacities bilaterally. Opacities extend into the right lung base. A layering right pleural effusion is suspected. There is no appreciable pneumothorax. Suspect pulmonary vascular congestion with right greater than left basilar atelectasis. Layering right pleural effusion suspected. Signed by Dr Juan Rincon on 10/23/2018 7:19 AM    Cta Pulmonary W Contrast    Result Date: 10/23/2018  CTA PULMONARY W CONTRAST 10/23/2018 4:00 AM HISTORY: COMPARISON: None. DLP: 863 mGy cm TECHNIQUE: Helical tomographic images of the chest were obtained after the administration of intravenous contrast following angiogram protocol. Additionally, 3D MIP reconstructions in the coronal and sagittal planes were provided. FINDINGS:  Pulmonary arteries: There is adequate enhancement of the pulmonary arteries to evaluate for central and segmental pulmonary emboli. There are no filling defects within the main, lobar, segmental or visualized subsegmental pulmonary arteries.  . Aorta and great vessels: There is atherosclerosis in the aorta. There is atherosclerosis in the great vessels without evidence of stenosis. Coronary artery calcifications are visualized. Neck base: The imaged portion of the base of the neck appears unremarkable. Lungs: The lungs are clear. Bilateral pleural effusions are present slightly greater on the right than the left. Bibasilar atelectasis is also noted. The trachea and bronchial tree are patent. Heart: The heart is normal in size. There is no pericardial effusion. Lymph nodes: No pathologically enlarged mediastinal, hilar, or axillary lymph nodes are present. Bones and soft tissues: The osseous structures of the thorax and surrounding soft tissues demonstrate no acute process. Upper abdomen: The imaged portion of the upper abdomen demonstrates no acute process. 1. No evidence of embolic disease. 2. Bilateral pleural effusions are noted. 3. Bibasilar dependent atelectasis.  Signed by Dr Elizabeth Powell on 10/23/2018 7:39 AM    My radiograph interpretation/independent review of imaging: No PE, bibasilar atelectasis and effusion, pulmonary edema noted    Other test results (not lab or imaging): none    Independent review of ekg: Atrial fibrillation, rate 136, QTc 473    Problem list generated by [employer]:  Patient Active Problem List   Diagnosis    Diverticulitis of large intestine with perforation without bleeding    Generalized abdominal pain    Colonic diverticular abscess    Bilateral carotid artery stenosis    Atherosclerosis of native artery of both lower extremities with intermittent claudication ([address]    Carotid stenosis, asymptomatic, left    Primary osteoarthritis of left knee    Arthritis of knee    Essential hypertension    Pure hypercholesterolemia    Slow transit constipation    Iron deficiency anemia    CKD (chronic kidney disease), stage II    GERD (gastroesophageal reflux disease)    Septic shock (HCC)    Sepsis ([address]    Acute liver failure without

## 2018-10-23 NOTE — ED PROVIDER NOTES
10/15/2018    GERD (gastroesophageal reflux disease)     Hyperlipidemia     Hypertension     Hypertension     Primary osteoarthritis of left knee 10/14/2018    PVD (peripheral vascular disease) [facility])          SURGICAL HISTORY       Past Surgical History:   Procedure Laterality Date    BACK SURGERY      COLONOSCOPY  2007?  ND COLONOSCOPY FLX DX W/COLLJ SPEC WHEN PFRMD N/A 9/11/2017    Dr Shira Qureshi internal hemorrhoids, diverticular disease-HP-No recall (age)   [facility] ND REVISE MEDIAN N/CARPAL TUNNEL SURG Left 7/18/2018    OPEN CARPAL TUNNEL RELEASE performed by Minh Núñez MD at [address] Left 8/28/2018    LEFT CAROTID ENDARTERECTOMY WITH VEIN PATCH ANGIOPLASTY AND COMPLETION ANGIOGRAM performed by Gallito Blount MD at [address] 10/15/2018    LEFT COMPLEX TOTAL KNEE ARTHROPLASTY performed by Minh Núñez MD at [facility] VASCULAR SURGERY  04/21/2015    Dexter BESS Ultrasound guided access of left common femoral artery. Aortogram.Diagnostic right lower extremity arteriogram.Radiologic supervision and interpretation.  VASCULAR SURGERY  01/13/2015    Dexter Beckman M.D Atherectomy,angioplasty,and stenting of left superficial femoral artery.  VASCULAR SURGERY  03/11/2014    Dexter Beckman M.D. Ultrasound-guided access of right common femoral artery. Aortogram.Left lower extremity arteriogram.Atherectomy and angioplasty of left superficial femoral artery. Radiologic supervision and interpretation.  VASCULAR SURGERY  01/18/2013    Dexter BESS Aortogram.Multistation arteriogram right lower extremity. Laser atherectomy and angioplasty of right superficial femoral artery. Selective catheterization of right tibioperoneal trunk. Angioplasty of peroneal artery and tibioperoneal trunk.          CURRENT MEDICATIONS     Previous Medications    APIXABAN (ELIQUIS) 2.5 MG TABS TABLET    One

## 2018-10-24 ENCOUNTER — APPOINTMENT (OUTPATIENT)
Dept: GENERAL RADIOLOGY | Age: 78
DRG: 871 | End: 2018-10-24
Payer: MEDICARE

## 2018-10-24 PROBLEM — Z51.5 PALLIATIVE CARE PATIENT: Status: ACTIVE | Noted: 2018-10-23

## 2018-10-24 LAB
ABO/RH: NORMAL
ACETAMINOPHEN LEVEL: <15 UG/ML
ALBUMIN SERPL-MCNC: 2.9 G/DL (ref 3.5–5.2)
ALBUMIN SERPL-MCNC: 3 G/DL (ref 3.5–5.2)
ALBUMIN SERPL-MCNC: 3 G/DL (ref 3.5–5.2)
ALBUMIN SERPL-MCNC: 3.1 G/DL (ref 3.5–5.2)
ALBUMIN SERPL-MCNC: 3.2 G/DL (ref 3.5–5.2)
ALP BLD-CCNC: 172 U/L (ref 40–130)
ALP BLD-CCNC: 175 U/L (ref 40–130)
ALP BLD-CCNC: 176 U/L (ref 40–130)
ALP BLD-CCNC: 191 U/L (ref 40–130)
ALP BLD-CCNC: 206 U/L (ref 40–130)
ALT SERPL-CCNC: 5245 U/L (ref 5–41)
ALT SERPL-CCNC: 5491 U/L (ref 5–41)
ALT SERPL-CCNC: 5687 U/L (ref 5–41)
ALT SERPL-CCNC: 5869 U/L (ref 5–41)
ALT SERPL-CCNC: 6278 U/L (ref 5–41)
AMMONIA: 52 UMOL/L (ref 16–60)
ANION GAP SERPL CALCULATED.3IONS-SCNC: 16 MMOL/L (ref 7–19)
ANION GAP SERPL CALCULATED.3IONS-SCNC: 22 MMOL/L (ref 7–19)
ANION GAP SERPL CALCULATED.3IONS-SCNC: 26 MMOL/L (ref 7–19)
ANION GAP SERPL CALCULATED.3IONS-SCNC: 31 MMOL/L (ref 7–19)
ANION GAP SERPL CALCULATED.3IONS-SCNC: 33 MMOL/L (ref 7–19)
ANION GAP SERPL CALCULATED.3IONS-SCNC: 35 MMOL/L (ref 7–19)
ANTIBODY SCREEN: NORMAL
APTT: 41.7 SEC (ref 26–36.2)
AST SERPL-CCNC: >7000 U/L (ref 5–40)
BASE EXCESS ARTERIAL: -12.7 MMOL/L (ref -2–2)
BASE EXCESS ARTERIAL: 6.2 MMOL/L (ref -2–2)
BASOPHILS ABSOLUTE: 0.1 K/UL (ref 0–0.2)
BASOPHILS ABSOLUTE: 0.1 K/UL (ref 0–0.2)
BASOPHILS RELATIVE PERCENT: 0.2 % (ref 0–1)
BASOPHILS RELATIVE PERCENT: 0.2 % (ref 0–1)
BILIRUB SERPL-MCNC: 4.4 MG/DL (ref 0.2–1.2)
BILIRUB SERPL-MCNC: 4.4 MG/DL (ref 0.2–1.2)
BILIRUB SERPL-MCNC: 4.5 MG/DL (ref 0.2–1.2)
BILIRUB SERPL-MCNC: 5.8 MG/DL (ref 0.2–1.2)
BILIRUB SERPL-MCNC: 6.1 MG/DL (ref 0.2–1.2)
BILIRUBIN DIRECT: 3.9 MG/DL (ref 0–0.3)
BILIRUBIN DIRECT: 4.2 MG/DL (ref 0–0.3)
BILIRUBIN, INDIRECT: 1.9 MG/DL (ref 0.1–1)
BILIRUBIN, INDIRECT: 1.9 MG/DL (ref 0.1–1)
BLOOD BANK DISPENSE STATUS: NORMAL
BLOOD BANK PRODUCT CODE: NORMAL
BPU ID: NORMAL
BUN BLDV-MCNC: 40 MG/DL (ref 8–23)
BUN BLDV-MCNC: 43 MG/DL (ref 8–23)
BUN BLDV-MCNC: 54 MG/DL (ref 8–23)
BUN BLDV-MCNC: 57 MG/DL (ref 8–23)
BUN BLDV-MCNC: 58 MG/DL (ref 8–23)
BUN BLDV-MCNC: 60 MG/DL (ref 8–23)
CALCIUM SERPL-MCNC: 6.8 MG/DL (ref 8.8–10.2)
CALCIUM SERPL-MCNC: 6.9 MG/DL (ref 8.8–10.2)
CALCIUM SERPL-MCNC: 7 MG/DL (ref 8.8–10.2)
CALCIUM SERPL-MCNC: 7.3 MG/DL (ref 8.8–10.2)
CALCIUM SERPL-MCNC: 7.3 MG/DL (ref 8.8–10.2)
CALCIUM SERPL-MCNC: 7.5 MG/DL (ref 8.8–10.2)
CARBOXYHEMOGLOBIN ARTERIAL: 2.3 % (ref 0–5)
CARBOXYHEMOGLOBIN ARTERIAL: 2.4 % (ref 0–5)
CHLORIDE BLD-SCNC: 85 MMOL/L (ref 98–111)
CHLORIDE BLD-SCNC: 85 MMOL/L (ref 98–111)
CHLORIDE BLD-SCNC: 86 MMOL/L (ref 98–111)
CHLORIDE BLD-SCNC: 86 MMOL/L (ref 98–111)
CHLORIDE BLD-SCNC: 87 MMOL/L (ref 98–111)
CHLORIDE BLD-SCNC: 88 MMOL/L (ref 98–111)
CO2: 12 MMOL/L (ref 22–29)
CO2: 15 MMOL/L (ref 22–29)
CO2: 15 MMOL/L (ref 22–29)
CO2: 22 MMOL/L (ref 22–29)
CO2: 26 MMOL/L (ref 22–29)
CO2: 31 MMOL/L (ref 22–29)
CORTISOL TOTAL: 46 UG/DL
CREAT SERPL-MCNC: 3.5 MG/DL (ref 0.5–1.2)
CREAT SERPL-MCNC: 4 MG/DL (ref 0.5–1.2)
CREAT SERPL-MCNC: 5 MG/DL (ref 0.5–1.2)
CREAT SERPL-MCNC: 5 MG/DL (ref 0.5–1.2)
CREAT SERPL-MCNC: 5.2 MG/DL (ref 0.5–1.2)
CREAT SERPL-MCNC: 5.3 MG/DL (ref 0.5–1.2)
DESCRIPTION BLOOD BANK: NORMAL
EKG P AXIS: NORMAL DEGREES
EKG P AXIS: NORMAL DEGREES
EKG P-R INTERVAL: NORMAL MS
EKG P-R INTERVAL: NORMAL MS
EKG Q-T INTERVAL: 324 MS
EKG Q-T INTERVAL: 462 MS
EKG QRS DURATION: 100 MS
EKG QRS DURATION: 110 MS
EKG QTC CALCULATION (BAZETT): 445 MS
EKG QTC CALCULATION (BAZETT): 453 MS
EKG T AXIS: -136 DEGREES
EKG T AXIS: 166 DEGREES
EOSINOPHILS ABSOLUTE: 0 K/UL (ref 0–0.6)
EOSINOPHILS ABSOLUTE: 0 K/UL (ref 0–0.6)
EOSINOPHILS RELATIVE PERCENT: 0 % (ref 0–5)
EOSINOPHILS RELATIVE PERCENT: 0 % (ref 0–5)
FERRITIN: >2000 NG/ML (ref 30–400)
FIBRINOGEN: 364 MG/DL (ref 238–505)
GFR NON-AFRICAN AMERICAN: 11
GFR NON-AFRICAN AMERICAN: 15
GFR NON-AFRICAN AMERICAN: 17
GLUCOSE BLD-MCNC: 197 MG/DL (ref 70–99)
GLUCOSE BLD-MCNC: 206 MG/DL (ref 74–109)
GLUCOSE BLD-MCNC: 264 MG/DL (ref 74–109)
GLUCOSE BLD-MCNC: 287 MG/DL (ref 70–99)
GLUCOSE BLD-MCNC: 312 MG/DL (ref 70–99)
GLUCOSE BLD-MCNC: 331 MG/DL (ref 70–99)
GLUCOSE BLD-MCNC: 402 MG/DL (ref 74–109)
GLUCOSE BLD-MCNC: 404 MG/DL (ref 70–99)
GLUCOSE BLD-MCNC: 404 MG/DL (ref 74–109)
GLUCOSE BLD-MCNC: 410 MG/DL (ref 70–99)
GLUCOSE BLD-MCNC: 414 MG/DL (ref 74–109)
GLUCOSE BLD-MCNC: 432 MG/DL (ref 70–99)
GLUCOSE BLD-MCNC: 438 MG/DL (ref 74–109)
GLUCOSE BLD-MCNC: 440 MG/DL (ref 70–99)
GLUCOSE BLD-MCNC: 442 MG/DL (ref 70–99)
HAV IGM SER IA-ACNC: NORMAL
HAV IGM SER IA-ACNC: NORMAL
HBV SURFACE AB TITR SER: NORMAL {TITER}
HCO3 ARTERIAL: 13.8 MMOL/L (ref 22–26)
HCO3 ARTERIAL: 29.4 MMOL/L (ref 22–26)
HCT VFR BLD CALC: 24.2 % (ref 42–52)
HCT VFR BLD CALC: 25.5 % (ref 42–52)
HEMOGLOBIN, ART, EXTENDED: 7.9 G/DL (ref 14–18)
HEMOGLOBIN, ART, EXTENDED: 9.1 G/DL (ref 14–18)
HEMOGLOBIN: 7.3 G/DL (ref 14–18)
HEMOGLOBIN: 8.5 G/DL (ref 14–18)
HEPATITIS B CORE IGM ANTIBODY: NORMAL
HEPATITIS B CORE IGM ANTIBODY: NORMAL
HEPATITIS B SURFACE ANTIGEN INTERPRETATION: NORMAL
HEPATITIS B SURFACE ANTIGEN INTERPRETATION: NORMAL
HEPATITIS C ANTIBODY INTERPRETATION: NORMAL
HEPATITIS C ANTIBODY INTERPRETATION: NORMAL
INR BLD: 6.77 (ref 0.88–1.18)
LACTIC ACID: 14 MMOL/L (ref 0.5–1.9)
LACTIC ACID: 8.1 MMOL/L (ref 0.5–1.9)
LYMPHOCYTES ABSOLUTE: 1.1 K/UL (ref 1.1–4.5)
LYMPHOCYTES ABSOLUTE: 1.9 K/UL (ref 1.1–4.5)
LYMPHOCYTES RELATIVE PERCENT: 3.9 % (ref 20–40)
LYMPHOCYTES RELATIVE PERCENT: 5.7 % (ref 20–40)
MAGNESIUM: 2 MG/DL (ref 1.6–2.4)
MAGNESIUM: 2.1 MG/DL (ref 1.6–2.4)
MAGNESIUM: 2.3 MG/DL (ref 1.6–2.4)
MCH RBC QN AUTO: 29.6 PG (ref 27–31)
MCH RBC QN AUTO: 29.8 PG (ref 27–31)
MCHC RBC AUTO-ENTMCNC: 30.2 G/DL (ref 33–37)
MCHC RBC AUTO-ENTMCNC: 33.3 G/DL (ref 33–37)
MCV RBC AUTO: 89.5 FL (ref 80–94)
MCV RBC AUTO: 98 FL (ref 80–94)
METHEMOGLOBIN ARTERIAL: 1.2 %
METHEMOGLOBIN ARTERIAL: 1.3 %
MONOCYTES ABSOLUTE: 0.8 K/UL (ref 0–0.9)
MONOCYTES ABSOLUTE: 1.6 K/UL (ref 0–0.9)
MONOCYTES RELATIVE PERCENT: 3 % (ref 0–10)
MONOCYTES RELATIVE PERCENT: 4.9 % (ref 0–10)
NEUTROPHILS ABSOLUTE: 24.2 K/UL (ref 1.5–7.5)
NEUTROPHILS ABSOLUTE: 28.3 K/UL (ref 1.5–7.5)
NEUTROPHILS RELATIVE PERCENT: 85.5 % (ref 50–65)
NEUTROPHILS RELATIVE PERCENT: 89.4 % (ref 50–65)
O2 CONTENT ARTERIAL: 10.9 ML/DL
O2 CONTENT ARTERIAL: 12.3 ML/DL
O2 SAT, ARTERIAL: 95.1 %
O2 SAT, ARTERIAL: 96.1 %
O2 THERAPY: ABNORMAL
O2 THERAPY: ABNORMAL
PCO2 ARTERIAL: 33 MMHG (ref 35–45)
PCO2 ARTERIAL: 36 MMHG (ref 35–45)
PDW BLD-RTO: 13.2 % (ref 11.5–14.5)
PDW BLD-RTO: 13.4 % (ref 11.5–14.5)
PERFORMED ON: ABNORMAL
PH ARTERIAL: 7.23 (ref 7.35–7.45)
PH ARTERIAL: 7.52 (ref 7.35–7.45)
PHOSPHORUS: 5.2 MG/DL (ref 2.5–4.5)
PHOSPHORUS: 5.6 MG/DL (ref 2.5–4.5)
PHOSPHORUS: 8.6 MG/DL (ref 2.5–4.5)
PLATELET # BLD: 170 K/UL (ref 130–400)
PLATELET # BLD: 179 K/UL (ref 130–400)
PMV BLD AUTO: 11.3 FL (ref 9.4–12.4)
PMV BLD AUTO: 11.5 FL (ref 9.4–12.4)
PO2 ARTERIAL: 108 MMHG (ref 80–100)
PO2 ARTERIAL: 78 MMHG (ref 80–100)
POTASSIUM REFLEX MAGNESIUM: 5.4 MMOL/L (ref 3.5–5)
POTASSIUM REFLEX MAGNESIUM: 6.3 MMOL/L (ref 3.5–5)
POTASSIUM SERPL-SCNC: 3.7 MMOL/L (ref 3.5–5)
POTASSIUM SERPL-SCNC: 4 MMOL/L (ref 3.5–5)
POTASSIUM SERPL-SCNC: 4.9 MMOL/L (ref 3.5–5)
POTASSIUM SERPL-SCNC: 6.7 MMOL/L (ref 3.5–5)
POTASSIUM, WHOLE BLOOD: 3.5
POTASSIUM, WHOLE BLOOD: 6.1
PROTHROMBIN TIME: 59.7 SEC (ref 12–14.6)
RAPID HIV 1&2: NORMAL
RBC # BLD: 2.47 M/UL (ref 4.7–6.1)
RBC # BLD: 2.85 M/UL (ref 4.7–6.1)
SODIUM BLD-SCNC: 132 MMOL/L (ref 136–145)
SODIUM BLD-SCNC: 133 MMOL/L (ref 136–145)
SODIUM BLD-SCNC: 134 MMOL/L (ref 136–145)
SODIUM BLD-SCNC: 136 MMOL/L (ref 136–145)
TOTAL PROTEIN: 5 G/DL (ref 6.6–8.7)
TOTAL PROTEIN: 5.2 G/DL (ref 6.6–8.7)
TOTAL PROTEIN: 5.2 G/DL (ref 6.6–8.7)
TOTAL PROTEIN: 5.3 G/DL (ref 6.6–8.7)
TOTAL PROTEIN: 5.5 G/DL (ref 6.6–8.7)
TROPONIN: 2.94 NG/ML (ref 0–0.03)
TROPONIN: 2.99 NG/ML (ref 0–0.03)
TROPONIN: 3.43 NG/ML (ref 0–0.03)
TROPONIN: 3.75 NG/ML (ref 0–0.03)
WBC # BLD: 27.1 K/UL (ref 4.8–10.8)
WBC # BLD: 33.1 K/UL (ref 4.8–10.8)

## 2018-10-24 PROCEDURE — 6360000002 HC RX W HCPCS: Performed by: FAMILY MEDICINE

## 2018-10-24 PROCEDURE — 36592 COLLECT BLOOD FROM PICC: CPT

## 2018-10-24 PROCEDURE — 85025 COMPLETE CBC W/AUTO DIFF WBC: CPT

## 2018-10-24 PROCEDURE — 99232 SBSQ HOSP IP/OBS MODERATE 35: CPT | Performed by: INTERNAL MEDICINE

## 2018-10-24 PROCEDURE — 82728 ASSAY OF FERRITIN: CPT

## 2018-10-24 PROCEDURE — 5A1D70Z PERFORMANCE OF URINARY FILTRATION, INTERMITTENT, LESS THAN 6 HOURS PER DAY: ICD-10-PCS | Performed by: INTERNAL MEDICINE

## 2018-10-24 PROCEDURE — 2500000003 HC RX 250 WO HCPCS: Performed by: INTERNAL MEDICINE

## 2018-10-24 PROCEDURE — 2580000003 HC RX 258: Performed by: PHYSICIAN ASSISTANT

## 2018-10-24 PROCEDURE — 6360000002 HC RX W HCPCS: Performed by: INTERNAL MEDICINE

## 2018-10-24 PROCEDURE — 2580000003 HC RX 258: Performed by: CLINICAL NURSE SPECIALIST

## 2018-10-24 PROCEDURE — 82533 TOTAL CORTISOL: CPT

## 2018-10-24 PROCEDURE — 2580000003 HC RX 258: Performed by: EMERGENCY MEDICINE

## 2018-10-24 PROCEDURE — C9113 INJ PANTOPRAZOLE SODIUM, VIA: HCPCS | Performed by: FAMILY MEDICINE

## 2018-10-24 PROCEDURE — 2100000000 HC CCU R&B

## 2018-10-24 PROCEDURE — 86900 BLOOD TYPING SEROLOGIC ABO: CPT

## 2018-10-24 PROCEDURE — 6370000000 HC RX 637 (ALT 250 FOR IP): Performed by: NURSE PRACTITIONER

## 2018-10-24 PROCEDURE — 86701 HIV-1ANTIBODY: CPT

## 2018-10-24 PROCEDURE — 83605 ASSAY OF LACTIC ACID: CPT

## 2018-10-24 PROCEDURE — 85610 PROTHROMBIN TIME: CPT

## 2018-10-24 PROCEDURE — 85384 FIBRINOGEN ACTIVITY: CPT

## 2018-10-24 PROCEDURE — 86706 HEP B SURFACE ANTIBODY: CPT

## 2018-10-24 PROCEDURE — 80074 ACUTE HEPATITIS PANEL: CPT

## 2018-10-24 PROCEDURE — 84484 ASSAY OF TROPONIN QUANT: CPT

## 2018-10-24 PROCEDURE — 2580000003 HC RX 258: Performed by: FAMILY MEDICINE

## 2018-10-24 PROCEDURE — 83735 ASSAY OF MAGNESIUM: CPT

## 2018-10-24 PROCEDURE — 2500000003 HC RX 250 WO HCPCS: Performed by: EMERGENCY MEDICINE

## 2018-10-24 PROCEDURE — 82948 REAGENT STRIP/BLOOD GLUCOSE: CPT

## 2018-10-24 PROCEDURE — 86880 COOMBS TEST DIRECT: CPT

## 2018-10-24 PROCEDURE — 86850 RBC ANTIBODY SCREEN: CPT

## 2018-10-24 PROCEDURE — 80053 COMPREHEN METABOLIC PANEL: CPT

## 2018-10-24 PROCEDURE — 6360000002 HC RX W HCPCS: Performed by: NURSE PRACTITIONER

## 2018-10-24 PROCEDURE — 71045 X-RAY EXAM CHEST 1 VIEW: CPT

## 2018-10-24 PROCEDURE — 86901 BLOOD TYPING SEROLOGIC RH(D): CPT

## 2018-10-24 PROCEDURE — 2700000000 HC OXYGEN THERAPY PER DAY

## 2018-10-24 PROCEDURE — 2580000003 HC RX 258: Performed by: INTERNAL MEDICINE

## 2018-10-24 PROCEDURE — 99291 CRITICAL CARE FIRST HOUR: CPT | Performed by: INTERNAL MEDICINE

## 2018-10-24 PROCEDURE — 6360000002 HC RX W HCPCS

## 2018-10-24 PROCEDURE — 6360000002 HC RX W HCPCS: Performed by: CLINICAL NURSE SPECIALIST

## 2018-10-24 PROCEDURE — 6370000000 HC RX 637 (ALT 250 FOR IP): Performed by: INTERNAL MEDICINE

## 2018-10-24 PROCEDURE — 6370000000 HC RX 637 (ALT 250 FOR IP): Performed by: CLINICAL NURSE SPECIALIST

## 2018-10-24 PROCEDURE — P9016 RBC LEUKOCYTES REDUCED: HCPCS

## 2018-10-24 PROCEDURE — 82140 ASSAY OF AMMONIA: CPT

## 2018-10-24 PROCEDURE — 82803 BLOOD GASES ANY COMBINATION: CPT

## 2018-10-24 PROCEDURE — 84100 ASSAY OF PHOSPHORUS: CPT

## 2018-10-24 PROCEDURE — G0480 DRUG TEST DEF 1-7 CLASSES: HCPCS

## 2018-10-24 PROCEDURE — 51702 INSERT TEMP BLADDER CATH: CPT

## 2018-10-24 PROCEDURE — 6360000002 HC RX W HCPCS: Performed by: EMERGENCY MEDICINE

## 2018-10-24 PROCEDURE — 85730 THROMBOPLASTIN TIME PARTIAL: CPT

## 2018-10-24 PROCEDURE — 36600 WITHDRAWAL OF ARTERIAL BLOOD: CPT

## 2018-10-24 PROCEDURE — 8010000000 HC HEMODIALYSIS ACUTE INPT

## 2018-10-24 PROCEDURE — 2500000003 HC RX 250 WO HCPCS: Performed by: FAMILY MEDICINE

## 2018-10-24 PROCEDURE — 84132 ASSAY OF SERUM POTASSIUM: CPT

## 2018-10-24 PROCEDURE — C9132 KCENTRA, PER I.U.: HCPCS | Performed by: NURSE PRACTITIONER

## 2018-10-24 RX ORDER — HEPARIN SODIUM 5000 [USP'U]/ML
INJECTION, SOLUTION INTRAVENOUS; SUBCUTANEOUS
Status: COMPLETED
Start: 2018-10-24 | End: 2018-10-24

## 2018-10-24 RX ORDER — SODIUM CHLORIDE 9 MG/ML
INJECTION, SOLUTION INTRAVENOUS CONTINUOUS
Status: DISCONTINUED | OUTPATIENT
Start: 2018-10-24 | End: 2018-10-25

## 2018-10-24 RX ORDER — SODIUM CHLORIDE 0.9 % (FLUSH) 0.9 %
10 SYRINGE (ML) INJECTION PRN
Status: DISCONTINUED | OUTPATIENT
Start: 2018-10-24 | End: 2018-11-02 | Stop reason: HOSPADM

## 2018-10-24 RX ORDER — PHYTONADIONE 5 MG/1
5 TABLET ORAL ONCE
Status: DISCONTINUED | OUTPATIENT
Start: 2018-10-24 | End: 2018-10-28

## 2018-10-24 RX ORDER — MAGNESIUM SULFATE 1 G/100ML
1 INJECTION INTRAVENOUS PRN
Status: DISCONTINUED | OUTPATIENT
Start: 2018-10-24 | End: 2018-10-28

## 2018-10-24 RX ORDER — SODIUM CHLORIDE 0.9 % (FLUSH) 0.9 %
10 SYRINGE (ML) INJECTION EVERY 12 HOURS SCHEDULED
Status: DISCONTINUED | OUTPATIENT
Start: 2018-10-24 | End: 2018-11-02 | Stop reason: HOSPADM

## 2018-10-24 RX ORDER — 0.9 % SODIUM CHLORIDE 0.9 %
500 INTRAVENOUS SOLUTION INTRAVENOUS ONCE
Status: DISCONTINUED | OUTPATIENT
Start: 2018-10-24 | End: 2018-10-28

## 2018-10-24 RX ORDER — SODIUM CHLORIDE 9 MG/ML
INJECTION, SOLUTION INTRAVENOUS CONTINUOUS
Status: DISCONTINUED | OUTPATIENT
Start: 2018-10-24 | End: 2018-10-24

## 2018-10-24 RX ORDER — DEXTROSE MONOHYDRATE 25 G/50ML
12.5 INJECTION, SOLUTION INTRAVENOUS PRN
Status: DISCONTINUED | OUTPATIENT
Start: 2018-10-24 | End: 2018-11-02 | Stop reason: HOSPADM

## 2018-10-24 RX ORDER — LIDOCAINE HYDROCHLORIDE 10 MG/ML
5 INJECTION, SOLUTION EPIDURAL; INFILTRATION; INTRACAUDAL; PERINEURAL ONCE
Status: DISCONTINUED | OUTPATIENT
Start: 2018-10-24 | End: 2018-10-28

## 2018-10-24 RX ORDER — DEXTROSE AND SODIUM CHLORIDE 5; .45 G/100ML; G/100ML
INJECTION, SOLUTION INTRAVENOUS CONTINUOUS PRN
Status: DISCONTINUED | OUTPATIENT
Start: 2018-10-24 | End: 2018-10-28

## 2018-10-24 RX ORDER — 0.9 % SODIUM CHLORIDE 0.9 %
250 INTRAVENOUS SOLUTION INTRAVENOUS ONCE
Status: COMPLETED | OUTPATIENT
Start: 2018-10-24 | End: 2018-10-24

## 2018-10-24 RX ADMIN — SODIUM CHLORIDE 500 ML: 9 INJECTION, SOLUTION INTRAVENOUS at 05:13

## 2018-10-24 RX ADMIN — METRONIDAZOLE 500 MG: 500 INJECTION, SOLUTION INTRAVENOUS at 23:39

## 2018-10-24 RX ADMIN — VASOPRESSIN 0.07 UNITS/MIN: 20 INJECTION INTRAVENOUS at 00:49

## 2018-10-24 RX ADMIN — NOREPINEPHRINE BITARTRATE 25 MCG/MIN: 1 INJECTION INTRAVENOUS at 23:37

## 2018-10-24 RX ADMIN — CALCIUM GLUCONATE 1 G: 98 INJECTION, SOLUTION INTRAVENOUS at 05:15

## 2018-10-24 RX ADMIN — CALCIUM GLUCONATE 1 G: 98 INJECTION, SOLUTION INTRAVENOUS at 11:54

## 2018-10-24 RX ADMIN — Medication 10 ML: at 07:55

## 2018-10-24 RX ADMIN — PANTOPRAZOLE SODIUM 40 MG: 40 INJECTION, POWDER, FOR SOLUTION INTRAVENOUS at 07:53

## 2018-10-24 RX ADMIN — Medication: at 05:15

## 2018-10-24 RX ADMIN — Medication 10 ML: at 22:24

## 2018-10-24 RX ADMIN — SODIUM CHLORIDE: 9 INJECTION, SOLUTION INTRAVENOUS at 19:15

## 2018-10-24 RX ADMIN — SODIUM CHLORIDE 250 ML: 9 INJECTION, SOLUTION INTRAVENOUS at 09:12

## 2018-10-24 RX ADMIN — DOPAMINE HYDROCHLORIDE 17.5 MCG/KG/MIN: 160 INJECTION, SOLUTION INTRAVENOUS at 00:48

## 2018-10-24 RX ADMIN — CEFEPIME HYDROCHLORIDE 2 G: 2 INJECTION, POWDER, FOR SOLUTION INTRAVENOUS at 07:53

## 2018-10-24 RX ADMIN — Medication 10 ML: at 07:50

## 2018-10-24 RX ADMIN — SODIUM CHLORIDE 0.1 UNITS/KG/HR: 9 INJECTION, SOLUTION INTRAVENOUS at 05:15

## 2018-10-24 RX ADMIN — CALCIUM GLUCONATE 4 G: 98 INJECTION, SOLUTION INTRAVENOUS at 22:20

## 2018-10-24 RX ADMIN — PROTHROMBIN, COAGULATION FACTOR VII HUMAN, COAGULATION FACTOR IX HUMAN, COAGULATION FACTOR X HUMAN, PROTEIN C, PROTEIN S HUMAN, AND WATER 5000 UNITS: KIT at 09:20

## 2018-10-24 RX ADMIN — METRONIDAZOLE 500 MG: 500 INJECTION, SOLUTION INTRAVENOUS at 19:13

## 2018-10-24 RX ADMIN — VANCOMYCIN HYDROCHLORIDE 1500 MG: 1 INJECTION, POWDER, LYOPHILIZED, FOR SOLUTION INTRAVENOUS at 11:00

## 2018-10-24 RX ADMIN — HEPARIN SODIUM 1000 UNITS: 5000 INJECTION, SOLUTION INTRAVENOUS; SUBCUTANEOUS at 09:13

## 2018-10-24 RX ADMIN — INSULIN LISPRO 2 UNITS: 100 INJECTION, SOLUTION INTRAVENOUS; SUBCUTANEOUS at 22:19

## 2018-10-24 RX ADMIN — METRONIDAZOLE 500 MG: 500 INJECTION, SOLUTION INTRAVENOUS at 07:54

## 2018-10-24 ASSESSMENT — ENCOUNTER SYMPTOMS
VOMITING: 0
EYE PAIN: 0
WHEEZING: 0
ABDOMINAL PAIN: 0
SORE THROAT: 0
EYE DISCHARGE: 0
COUGH: 1
ROS SKIN COMMENTS: LEFT KNEE SURGICAL INCISION
BLOOD IN STOOL: 0
SHORTNESS OF BREATH: 1
DIARRHEA: 0
RESPIRATORY NEGATIVE: 1
CONSTIPATION: 0
GASTROINTESTINAL NEGATIVE: 1
EYES NEGATIVE: 1
NAUSEA: 0
BACK PAIN: 0
EYE REDNESS: 0

## 2018-10-24 ASSESSMENT — PAIN SCALES - GENERAL
PAINLEVEL_OUTOF10: 0
PAINLEVEL_OUTOF10: 5
PAINLEVEL_OUTOF10: 2

## 2018-10-24 NOTE — H&P
Called at 423 am  Am by the nurse about labs results        Updated: 10/24/18 0152       Sodium 132 (L) mmol/L       Potassium reflex Magnesium 5.4 (H) mmol/L       Chloride 85 (L) mmol/L       CO2 12 (L) mmol/L       Anion Gap 35 (H) mmol/L       Glucose 404 (H) mg/dL       BUN 54 (H) mg/dL       CREATININE 5.0 (H) mg/dL       GFR Non- 11 (A)      Calcium 7.3 (L) mg/dL       Total Protein 5.2 (L) g/dL       Alb 3.0 (L) g/dL       Total Bilirubin 4.5 (H) mg/dL       Alkaline Phosphatase 172 (H) U/L       ALT 5,491 (H) U/L       AST >7,000 (H) U/L        Labs revealed significant transaminitis, hyperglycemia and high ag metabolic acidosis, the patient is on pressors, reviewing labs creatinine , acidosis worst .  I will go evaluate patient   Now repeat stat labs and abg, lactic acid , acetaminophen levels , cbc  Start insulin gtt this could be a combination of renal failure and dka + sepsis. Worsening k, lactate, iv fluids bolus pt inr higher possible effect of shock liver and anticoagulants, k elevated (started on insulin gtt, and ca+ given renal is following, fluids administration needs to be re-evaluated). Bicarbonate gtt to continue for now, dc if dramatic improvement with insulin gtt). Transfuse prbc to help hemodynamics.   cct 35 minutes
Coagulopathy (HCC)    Hyperkalemia    Hypochloremia    Metabolic acidosis  Resolved Problems:    * No resolved hospital problems. *    Admit to CCU. NS bolus. Patient placed on vancomycin and cefepime in ED. Placed on norepinephrine and dopamine in ED. Add vasopressin due to persistent hypotension. Bicarbonate. Cardiology, nephrology consults. 90 minutes of critical care time spent due to life-threatening illness.     Brandee Berger DO  Admitting Hospitalist

## 2018-10-24 NOTE — PROGRESS NOTES
kg/m². Estimated Creatinine Clearance: 16 mL/min (A) (based on SCr of 5.3 mg/dL (H)). Assessment/Plan:  Will initiate vancomycin 1500 mg IV x 1 dose. Timing of trough level will be determined based on culture results, renal function, and clinical response. Thank you for the consult. Will continue to follow.     Electronically signed by Marielos Powell [address] [place] [address] on 10/24/2018 at 9:33 AM

## 2018-10-24 NOTE — PROGRESS NOTES
swelling   14 point ROS reviewed with the patient and negative except as noted above and in the HPI unless unable to obtain. Objective:  Blood pressure (!) 105/50, pulse 68, temperature 97.5 °F (36.4 °C), resp. rate 20, height 6' (1.829 m), weight 268 lb (121.6 kg), SpO2 98 %. Intake/Output Summary (Last 24 hours) at 10/24/18 0959  Last data filed at 10/24/18 0911   Gross per 24 hour   Intake          4691.77 ml   Output              950 ml   Net          3741.77 ml     General: awake/alert   HEENT: Normocephalic atraumatic head  Neck: Supple with no JVD  Chest:  clear to auscultation bilaterally without respiratory distress  CVS: regular rate and rhythm  Abdominal: soft, nontender, normal bowel sounds  Extremities: no cyanosis or edema  Skin: warm and dry without rash      Labs:  BMP: Recent Labs      10/24/18   0040  10/24/18   0445  10/24/18   0446  10/24/18   0511  10/24/18   0855   NA  132*  133*   --   133*  134*   K  5.4*  6.7*  6.1  6.3*   --    CL  85*  87*   --   85*  86*   CO2  12*  15*   --   15*  22   PHOS   --    --    --    --   8.6*   BUN  54*  57*   --   58*  60*   CREATININE  5.0*  5.0*   --   5.3*  5.2*   CALCIUM  7.3*  7.0*   --   7.3*   --      CBC: Recent Labs      10/23/18   0420  10/23/18   1016  10/24/18   0511   WBC  16.6*  24.4*  33.1*   HGB  8.6*  7.6*  7.3*   HCT  28.0*  26.9*  24.2*   MCV  95.6*  101.9*  98.0*   PLT  295  286  179     LIVER PROFILE:   Recent Labs      10/23/18   0420   10/24/18   0040  10/24/18   0445  10/24/18   0511   AST  4,211*   < >  >7,000*  >7,000*  >7,000*   ALT  2,287*   < >  5,491*  5,687*  5,869*   LIPASE  72*   --    --    --    --    BILITOT  3.8*   < >  4.5*  4.4*  4.4*   ALKPHOS  190*   < >  172*  176*  175*    < > = values in this interval not displayed.      PT/INR:   Recent Labs      10/23/18   0420  10/24/18   0511   PROTIME  29.8*  59.7*   INR  2.82*  6.77*     APTT:   Recent Labs      10/23/18   0420  10/24/18   0511   APTT  38.5*  41.7* COMPARISON: None. DLP: 863 mGy cm TECHNIQUE: Helical tomographic images of the chest were obtained after the administration of intravenous contrast following angiogram protocol. Additionally, 3D MIP reconstructions in the coronal and sagittal planes were provided. FINDINGS:  Pulmonary arteries: There is adequate enhancement of the pulmonary arteries to evaluate for central and segmental pulmonary emboli. There are no filling defects within the main, lobar, segmental or visualized subsegmental pulmonary arteries. . Aorta and great vessels: There is atherosclerosis in the aorta. There is atherosclerosis in the great vessels without evidence of stenosis. Coronary artery calcifications are visualized. Neck base: The imaged portion of the base of the neck appears unremarkable. Lungs: The lungs are clear. Bilateral pleural effusions are present slightly greater on the right than the left. Bibasilar atelectasis is also noted. The trachea and bronchial tree are patent. Heart: The heart is normal in size. There is no pericardial effusion. Lymph nodes: No pathologically enlarged mediastinal, hilar, or axillary lymph nodes are present. Bones and soft tissues: The osseous structures of the thorax and surrounding soft tissues demonstrate no acute process. Upper abdomen: The imaged portion of the upper abdomen demonstrates no acute process. 1. No evidence of embolic disease. 2. Bilateral pleural effusions are noted. 3. Bibasilar dependent atelectasis. Signed by Dr Rey Vigil on 10/23/2018 7:39 AM       Assessment   1. Acute kidney injury/ATN/normochromic oliguric. 2. Severe metabolic acidosis. 3. Hyperkalemia. 4. Septic shock. 5. Urinary tract infection. 6. ? Cholecystitis. 7. Severe transaminitis/shock liver. 8. Anemia. Plan:  1. Wean off vasopressor. 2. Initiate conventional dialysis. 3. I have already placed a right femoral line. 4. Plan was discussed with the family.     Penelope Bowers MD  10/24/18  9:59 AM

## 2018-10-24 NOTE — PROGRESS NOTES
Hospitalist Progress Note  10/24/2018 10:12 AM  Subjective:   Admit Date: 10/23/2018  PCP: Jose Eduardo Laws MD    Chief Complaint: hematuria, fatigue    Subjective: Patient states he feels better this AM.  Complains of pain from carter catheter. Patient labs show worsening renal function, still with severe acidosis. Per nursing, nephro considering dialysis today. Cumulative Hospital History:   10/23: Admitted to ICU for septic shock, multiorgan failure. Started on pressors and broad spectrum abx. ROS: 14 point review of systems is negative except as specifically addressed above.     Diet NPO Effective Now    Intake/Output Summary (Last 24 hours) at 10/24/18 1012  Last data filed at 10/24/18 0911   Gross per 24 hour   Intake          4691.77 ml   Output              950 ml   Net          3741.77 ml     Medications:   sodium chloride      dextrose 5 % and 0.45 % NaCl      insulin (HUMAN R) non-weight based infusion 17.1 Units/kg/hr (10/24/18 0911)    norepinephrine 20 mcg/min (10/24/18 1011)    DOPamine Stopped (10/24/18 1004)    sodium chloride Stopped (10/23/18 1308)    sodium bicarbonate infusion 150 mL/hr at 10/24/18 0900    vasopressin infusion Stopped (10/24/18 0242)     Current Facility-Administered Medications   Medication Dose Route Frequency Provider Last Rate Last Dose    dextrose 50 % solution 12.5 g  12.5 g Intravenous PRN Zaire Richardson MD        magnesium sulfate 1 g in dextrose 5% 100 mL IVPB  1 g Intravenous PRN Zaire Richardson MD        0.9 % sodium chloride infusion   Intravenous Continuous Zaire Richardson MD        dextrose 5 % and 0.45 % sodium chloride infusion   Intravenous Continuous PRN Zaire Richardson MD        insulin regular (HUMULIN R;NOVOLIN R) 100 Units in sodium chloride 0.9 % 100 mL infusion  0.1 Units/kg/hr Intravenous Continuous Cheli Hines MD 1,706.6 mL/hr at 10/24/18 0911 17.1 Units/kg/hr at 10/24/18 0911    0.9 % sodium chloride bolus  500 mL (1.829 m)   Wt 268 lb (121.6 kg)   SpO2 98%   BMI 36.35 kg/m²   24HR INTAKE/OUTPUT:    Intake/Output Summary (Last 24 hours) at 10/24/18 1012  Last data filed at 10/24/18 0911   Gross per 24 hour   Intake          4691.77 ml   Output              950 ml   Net          3741.77 ml     General appearance: alert and cooperative with exam  HEENT: atraumatic, eyes with clear conjunctiva and normal lids, pupils and irises normal, external ears and nose are normal, lips normal.  Neck without masses, lympadenopathy, bruit, thyroid normal  Lungs: no increased work of breathing, diminished breath sounds bilaterally, rales bibasilar and    Heart: regular rate and rhythm and S1, S2 normal  Abdomen: soft, non-tender; bowel sounds normal; no masses,  no organomegaly  Extremities: extremities normal, atraumatic, no cyanosis or edema  Neurologic: No focal neurologic deficits, normal sensation, alert and oriented, affect and mood appropriate. Skin: no rashes, nodules. Assessment and Plan:   Principal Problem:    Septic shock ([address]  Active Problems:    Sepsis ([address]    Acute liver failure without hepatic coma    Acute kidney injury ([address]    CHF (congestive heart failure) (HCC)    Gross hematuria    Bilateral pleural effusion    Acute respiratory failure with hypoxia (HCC)    Elevated troponin    Coagulopathy (HCC)    Hyperkalemia    Hypochloremia    Metabolic acidosis    Cardiogenic shock ([address]    Palliative care patient  Resolved Problems:    * No resolved hospital problems. *    Broaden abx coverage with flagyl and pharmacy to dose vanco.  Abx day 2. Will consult GI due to severely elevated LFTs, although most likely 2/2 shock liver. Consult surgery due to ?cholecystitis seen on CT scan and concern for ischemia in setting of LA 14. Patient pressor requirements being weaned. Insulin gtt started overnight, continue to monitor requirements. Patient ro receive     Recheck LA, continue to monitor serial labs.

## 2018-10-24 NOTE — PROGRESS NOTES
Spoke with Dr. Jay Tomlin regarding no order for pt's carter and CBI. Received orders to continue CBI running to no blood clots and for carter. Told to place order under Dr. Tawnya Abarca.  Electronically signed by Anjelica Munoz RN on 10/23/2018 at 9:13 PM

## 2018-10-24 NOTE — CONSULTS
[facility]  Cardiology Consult    Requesting MD:  Daniel Rae MD Admit Status:  Inpatient       History obtained from:   [] Patient  [] Other (specify):      Chief Complaint: EVALUATION FOR ENCEPHALOPATHY AND HEPATIC FAILURE. Chief Complaint   Patient presents with    Hematuria    Irregular Heart Beat     ON. E WEEK AGO PT UNDERWENT TOTAL KNEE REPLACEMENT AND HAD BEEN DOING WELL WITH USING A WALKER . HE HAD NO C/O JOINT PAIN AND WAS USING VERY LITTLE PAIN MEDS, HE USUALLY DOES NOT DRINK ETOH ONLY A GLASS OF WINE ON OCCASION. HE TOLD HIS WIFE THAT HE WAS NOT FEELING WELL AND WANTED TO GO TO ED . WAS DX WITH SEPSIS AND ADMITTED FOR GONZALEZ AND TREAT =MENT. NOT TAKING ANY KNOWN HEPATOTOXIC AGENTS OTHER THAN POSSIBLE ANESTHESIA. NO NIDUS OF INFECTION HAS BEEN IDENTIFIED. WBC 33, NEUTROPHILS 85.5, HB 7.3, ALT 2,287 TO 5,869, AST 4,211 TO 7,000, TBILI 4.4  . INR >6. CT THICKENED GB WALL W FLUID CANNOT R/O CHOLECYSTITIS  Review of Systems:  Review of Systems   Constitutional:        UNAVAILABLE   HENT: Negative. UNAVAILABLE   Respiratory: Negative. UNAVAILABLE   Gastrointestinal:        UNAVAILABLE       Past Medical History:  Past Medical History:   Diagnosis Date    Acute liver failure without hepatic coma 10/23/2018    Back pain     \"with tired legs as a result\"    Blood circulation, collateral     [facility]otid arterial [facility] ([facility]     recent surgery    CHF (congestive heart failure) ([facility] 10/23/2018    CKD (chronic kidney disease), 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/2018    GERD (gastroesophageal reflux disease)     Hyperlipidemia     Hypertension     Hypertension     Palliative care patient 10/23/2018    Primary osteoarthritis of left knee 10/14/2018    PVD (peripheral vascular disease) [facility])         Past Surgical History:  Past Surgical History:   Procedure Laterality Date    BACK SURGERY      COLONOSCOPY  2007?     ID COLONOSCOPY FLX DX W/COLLJ SPEC WHEN PFRMD N/A lb (121.6 kg). Physical Exam   Constitutional: He appears well-developed and well-nourished. HENT:   Head: Normocephalic. ECCHYMOSIS ABOUT NECK   Eyes:   NOT ASSESED   Neck: Neck supple. Cardiovascular: Normal rate and regular rhythm. Pulmonary/Chest:   USING ACCESSORY MUSCULES   Abdominal: Soft. Bowel sounds are hypoactive liver palpable 3 fingerbreadths below costal margin       Labs:  Recent Labs      10/23/18   1016  10/24/18   0511  10/24/18   1600   WBC  24.4*  33.1*  27.1*   HGB  7.6*  7.3*  8.5*   PLT  286  179  170     Recent Labs      10/24/18   0511  10/24/18   0855  10/24/18   1520  10/24/18   1618   NA  133*  134*  136   --    K  6.3*  4.9  3.7  3.5   CL  85*  86*  88*   --    CO2  15*  22  26   --    BUN  58*  60*  40*   --    CREATININE  5.3*  5.2*  3.5*   --    LABGLOM  11*  11*  17*   --    MG   --   2.3  2.1   --    CALCIUM  7.3*  6.8*  7.5*   --    PHOS   --   8.6*  5.2*   --      Recent Labs      10/23/18   0420   10/24/18   0040  10/24/18   0511  10/24/18   1210   TROPONINI  2.31*   < >  3.75*  3.43*  2.94*   PROBNP  22,261*   --    --    --    --     < > = values in this interval not displayed. Last 3 BNP:  No results for input(s): BNP in the last 72 hours. Imaging:  Xr Knee Left (1-2 Views)    Result Date: 10/15/2018  Examination. XR KNEE LEFT (1-2 VIEWS) History: Postop arthroplasty. The portable frontal and crosstable laterally of the left knee are obtained. There is no previous study for comparison. There is evidence of a knee arthroplasty. There is normal alignment of the hardware components. No complication. There is soft tissue air around the knee. No acute bony abnormality. A superficial femoral and popliteal artery graft is seen in place. Severe atheromatous changes of the distal popliteal and tibial arteries are seen. A normal left knee arthroplasty.  Signed by Dr Jeremias Vasquez on 10/15/2018 1:19 PM    Us Renal Complete    Result Date: corticomedullary differentiation. The renal cortex measures 1.1 cm in thickness. The urinary bladder is poorly distended and may not be optimally evaluated. A right renal cyst. No hydronephrosis on either side. Signed by Dr Toyin Santiago on 10/17/2018 11:56 AM    Ct Abdomen Pelvis W Iv Contrast Additional Contrast? None    Result Date: 10/23/2018  Examination. CT ABDOMEN PELVIS W IV CONTRAST History: Abnormal liver function tests. DLP: 164 8mGycm. The CT scan of the abdomen and pelvis is performed without oral or intravenous contrast enhancement. The images are acquired in axial plane with subsequent reconstruction in coronal and sagittal planes. The comparison is made with the previous study dated 8/7/2017. There are extensive artifacts which limit the diagnostic yield of the study. The lung bases included in the study show small bibasilar pleural effusion, more on the right side and a mild by basilar dependent atelectasis and compression atelectasis. Atheromatous changes of the thoracic aorta and coronary arteries are seen. The visualized liver and spleen appear unremarkable. There is moderate diffuse thickening of the gallbladder wall with small amount of fluid in the gallbladder fossa. No radiopaque calculi are seen. The common bile duct is not dilated. The pancreas appear normal. The pancreatic duct is not dilated. The adrenal glands bilaterally appear normal. The kidneys are unremarkable. There is bilateral perirenal fat infiltration. No evidence of hydronephrosis. The ureters are not optimally visualized. The urinary bladder is decompressed. There is an apparent soft tissue density located posteriorly at the base of the urinary bladder which may be extrinsic pressure of the enlarged prostate? Fallon Tapia This may be further evaluated. The prostate is moderately enlarged. There are small fat-containing inguinal hernias bilaterally. The stomach appears normal. Normal small bowel is seen. Appendix is not visualized.

## 2018-10-24 NOTE — CONSULTS
resume previous aspirin regimen or discontinue if not previously on aspirin. aspirin 81 MG tablet, Take 81 mg by mouth daily  atorvastatin (LIPITOR) 20 MG tablet, TK 1 T PO QD  losartan-hydrochlorothiazide (HYZAAR) 100-25 MG per tablet, TK 1 T PO QD  metoprolol succinate (TOPROL XL) 100 MG extended release tablet, Take 100 mg by mouth daily  NIFEdipine (PROCARDIA XL) 60 MG extended release tablet, Take 60 mg by mouth daily  pantoprazole (PROTONIX) 40 MG tablet, Take 40 mg by mouth daily  Current Meds:  Scheduled Meds:   sodium chloride  500 mL Intravenous Once    sodium chloride  250 mL Intravenous Once    lidocaine 1 % injection  5 mL Intradermal Once    sodium chloride flush  10 mL Intravenous 2 times per day    metroNIDAZOLE  500 mg Intravenous Q8H    phytonadione  5 mg Oral Once    vancomycin  1,500 mg Intravenous Once    vancomycin (VANCOCIN) intermittent dosing (placeholder)   Other RX Placeholder    pantoprazole  40 mg Intravenous Daily    And    sodium chloride (PF)  10 mL Intravenous Daily    cefepime  2 g Intravenous Q24H    sodium polystyrene  15 g Rectal Once       ContinuousInfusions:   sodium chloride      dextrose 5 % and 0.45 % NaCl      insulin (HUMAN R) non-weight based infusion 17.1 Units/kg/hr (10/24/18 0911)    norepinephrine Stopped (10/24/18 0900)    DOPamine 10 mcg/kg/min (10/24/18 0924)    sodium chloride Stopped (10/23/18 1308)    sodium bicarbonate infusion 150 mL/hr at 10/24/18 0900    vasopressin infusion Stopped (10/24/18 0242)     PRN Meds:dextrose, magnesium sulfate, dextrose 5 % and 0.45 % NaCl, sodium chloride flush    Allergies:    Promethazine hcl    Social History:    reports that he quit smoking about 15 years ago. He has never used smokeless tobacco. He reports that he drinks about 7.2 oz of alcohol per week . He reports that he does not use drugs.       Family History:   family history includes Colon Cancer in his father; Diabetes in his 10/24/2018- improving pulmonary edema and pulmonary vascular congestion. Basilar atelectasis  · Flagyl 500 mg IV every 8 hours- per IM  · Vancomycin 1500 mg IV today- pharmacy to dose    Acute kidney injury  · Creatinine 5.2  · GFR 11  · Renal ultrasound 10/23/2018- Mild atrophy of the left kidney with mild cortical thinning. No renal mass or hydronephrosis. · Nephrology managing  · Anticipate initiation of dialysis today    Acute liver failure  · Total bilirubin 4.4  · Alkaline phosphatase 176  · ALT 5,687  · AST > 7,000        Thank you for the consultation and opportunity to participate in Ashwin's plan of care.       Kay Metz, ELISABETH  9:48 AM  10/24/2018

## 2018-10-25 ENCOUNTER — APPOINTMENT (OUTPATIENT)
Dept: GENERAL RADIOLOGY | Age: 78
DRG: 871 | End: 2018-10-25
Payer: MEDICARE

## 2018-10-25 LAB
ALBUMIN SERPL-MCNC: 2.6 G/DL (ref 3.5–5.2)
ALBUMIN SERPL-MCNC: 2.9 G/DL (ref 3.5–5.2)
ALBUMIN SERPL-MCNC: 3 G/DL (ref 3.5–5.2)
ALP BLD-CCNC: 172 U/L (ref 40–130)
ALP BLD-CCNC: 189 U/L (ref 40–130)
ALP BLD-CCNC: 197 U/L (ref 40–130)
ALT SERPL-CCNC: 2789 U/L (ref 5–41)
ALT SERPL-CCNC: 4126 U/L (ref 5–41)
ALT SERPL-CCNC: 4709 U/L (ref 5–41)
ANION GAP SERPL CALCULATED.3IONS-SCNC: 13 MMOL/L (ref 7–19)
ANION GAP SERPL CALCULATED.3IONS-SCNC: 13 MMOL/L (ref 7–19)
ANION GAP SERPL CALCULATED.3IONS-SCNC: 14 MMOL/L (ref 7–19)
ANION GAP SERPL CALCULATED.3IONS-SCNC: 14 MMOL/L (ref 7–19)
ANION GAP SERPL CALCULATED.3IONS-SCNC: 18 MMOL/L (ref 7–19)
APTT: 37.1 SEC (ref 26–36.2)
AST SERPL-CCNC: 3039 U/L (ref 5–40)
AST SERPL-CCNC: 4835 U/L (ref 5–40)
AST SERPL-CCNC: >7000 U/L (ref 5–40)
BASE EXCESS ARTERIAL: 5.6 MMOL/L (ref -2–2)
BASOPHILS ABSOLUTE: 0 K/UL (ref 0–0.2)
BASOPHILS RELATIVE PERCENT: 0.1 % (ref 0–1)
BILIRUB SERPL-MCNC: 5.7 MG/DL (ref 0.2–1.2)
BILIRUB SERPL-MCNC: 6.3 MG/DL (ref 0.2–1.2)
BILIRUB SERPL-MCNC: 6.4 MG/DL (ref 0.2–1.2)
BUN BLDV-MCNC: 27 MG/DL (ref 8–23)
BUN BLDV-MCNC: 32 MG/DL (ref 8–23)
BUN BLDV-MCNC: 32 MG/DL (ref 8–23)
BUN BLDV-MCNC: 38 MG/DL (ref 8–23)
BUN BLDV-MCNC: 51 MG/DL (ref 8–23)
CALCIUM SERPL-MCNC: 6.6 MG/DL (ref 8.8–10.2)
CALCIUM SERPL-MCNC: 6.8 MG/DL (ref 8.8–10.2)
CALCIUM SERPL-MCNC: 7.1 MG/DL (ref 8.8–10.2)
CALCIUM SERPL-MCNC: 7.3 MG/DL (ref 8.8–10.2)
CALCIUM SERPL-MCNC: 7.4 MG/DL (ref 8.8–10.2)
CARBOXYHEMOGLOBIN ARTERIAL: 2.6 % (ref 0–5)
CHLORIDE BLD-SCNC: 82 MMOL/L (ref 98–111)
CHLORIDE BLD-SCNC: 84 MMOL/L (ref 98–111)
CHLORIDE BLD-SCNC: 87 MMOL/L (ref 98–111)
CHLORIDE BLD-SCNC: 90 MMOL/L (ref 98–111)
CHLORIDE BLD-SCNC: 94 MMOL/L (ref 98–111)
CO2: 25 MMOL/L (ref 22–29)
CO2: 26 MMOL/L (ref 22–29)
CO2: 26 MMOL/L (ref 22–29)
CO2: 27 MMOL/L (ref 22–29)
CO2: 28 MMOL/L (ref 22–29)
CREAT SERPL-MCNC: 2.8 MG/DL (ref 0.5–1.2)
CREAT SERPL-MCNC: 3.3 MG/DL (ref 0.5–1.2)
CREAT SERPL-MCNC: 3.4 MG/DL (ref 0.5–1.2)
CREAT SERPL-MCNC: 3.9 MG/DL (ref 0.5–1.2)
CREAT SERPL-MCNC: 4.5 MG/DL (ref 0.5–1.2)
EOSINOPHILS ABSOLUTE: 0 K/UL (ref 0–0.6)
EOSINOPHILS RELATIVE PERCENT: 0 % (ref 0–5)
GFR NON-AFRICAN AMERICAN: 13
GFR NON-AFRICAN AMERICAN: 15
GFR NON-AFRICAN AMERICAN: 18
GFR NON-AFRICAN AMERICAN: 18
GFR NON-AFRICAN AMERICAN: 22
GLUCOSE BLD-MCNC: 151 MG/DL (ref 70–99)
GLUCOSE BLD-MCNC: 152 MG/DL (ref 74–109)
GLUCOSE BLD-MCNC: 154 MG/DL (ref 70–99)
GLUCOSE BLD-MCNC: 160 MG/DL (ref 70–99)
GLUCOSE BLD-MCNC: 203 MG/DL (ref 70–99)
GLUCOSE BLD-MCNC: 203 MG/DL (ref 70–99)
GLUCOSE BLD-MCNC: 208 MG/DL (ref 70–99)
GLUCOSE BLD-MCNC: 218 MG/DL (ref 70–99)
GLUCOSE BLD-MCNC: 218 MG/DL (ref 74–109)
GLUCOSE BLD-MCNC: 239 MG/DL (ref 74–109)
GLUCOSE BLD-MCNC: 240 MG/DL (ref 74–109)
GLUCOSE BLD-MCNC: 598 MG/DL (ref 74–109)
GLUCOSE BLD-MCNC: 654 MG/DL (ref 74–109)
HCO3 ARTERIAL: 30.5 MMOL/L (ref 22–26)
HCT VFR BLD CALC: 24.7 % (ref 42–52)
HEMOGLOBIN, ART, EXTENDED: 8.4 G/DL (ref 14–18)
HEMOGLOBIN: 8 G/DL (ref 14–18)
INR BLD: 3.12 (ref 0.88–1.18)
LACTIC ACID: 1.8 MMOL/L (ref 0.5–1.9)
LYMPHOCYTES ABSOLUTE: 0.9 K/UL (ref 1.1–4.5)
LYMPHOCYTES RELATIVE PERCENT: 4.2 % (ref 20–40)
MAGNESIUM: 1.7 MG/DL (ref 1.6–2.4)
MAGNESIUM: 1.8 MG/DL (ref 1.6–2.4)
MAGNESIUM: 1.8 MG/DL (ref 1.6–2.4)
MCH RBC QN AUTO: 29.2 PG (ref 27–31)
MCHC RBC AUTO-ENTMCNC: 32.4 G/DL (ref 33–37)
MCV RBC AUTO: 90.1 FL (ref 80–94)
METHEMOGLOBIN ARTERIAL: 1.4 %
MONOCYTES ABSOLUTE: 0.6 K/UL (ref 0–0.9)
MONOCYTES RELATIVE PERCENT: 3 % (ref 0–10)
NEUTROPHILS ABSOLUTE: 19.1 K/UL (ref 1.5–7.5)
NEUTROPHILS RELATIVE PERCENT: 89.9 % (ref 50–65)
O2 CONTENT ARTERIAL: 11.4 ML/DL
O2 SAT, ARTERIAL: 95.1 %
O2 THERAPY: ABNORMAL
PCO2 ARTERIAL: 46 MMHG (ref 35–45)
PDW BLD-RTO: 13.7 % (ref 11.5–14.5)
PERFORMED ON: ABNORMAL
PH ARTERIAL: 7.43 (ref 7.35–7.45)
PHOSPHORUS: 3.3 MG/DL (ref 2.5–4.5)
PHOSPHORUS: 3.5 MG/DL (ref 2.5–4.5)
PHOSPHORUS: 3.6 MG/DL (ref 2.5–4.5)
PLATELET # BLD: 144 K/UL (ref 130–400)
PMV BLD AUTO: 11.5 FL (ref 9.4–12.4)
PO2 ARTERIAL: 85 MMHG (ref 80–100)
POTASSIUM REFLEX MAGNESIUM: 3.9 MMOL/L (ref 3.5–5)
POTASSIUM REFLEX MAGNESIUM: 3.9 MMOL/L (ref 3.5–5)
POTASSIUM REFLEX MAGNESIUM: 4.1 MMOL/L (ref 3.5–5)
POTASSIUM SERPL-SCNC: 3.7 MMOL/L (ref 3.5–5)
POTASSIUM SERPL-SCNC: 3.8 MMOL/L (ref 3.5–5)
POTASSIUM, WHOLE BLOOD: 3.9
PROTHROMBIN TIME: 32.3 SEC (ref 12–14.6)
RBC # BLD: 2.74 M/UL (ref 4.7–6.1)
SODIUM BLD-SCNC: 121 MMOL/L (ref 136–145)
SODIUM BLD-SCNC: 123 MMOL/L (ref 136–145)
SODIUM BLD-SCNC: 129 MMOL/L (ref 136–145)
SODIUM BLD-SCNC: 133 MMOL/L (ref 136–145)
SODIUM BLD-SCNC: 135 MMOL/L (ref 136–145)
TOTAL PROTEIN: 4.4 G/DL (ref 6.6–8.7)
TOTAL PROTEIN: 5 G/DL (ref 6.6–8.7)
TOTAL PROTEIN: 5.1 G/DL (ref 6.6–8.7)
TROPONIN: 2.53 NG/ML (ref 0–0.03)
TROPONIN: 2.89 NG/ML (ref 0–0.03)
VANCOMYCIN RANDOM: 12.4 UG/ML
WBC # BLD: 21.3 K/UL (ref 4.8–10.8)

## 2018-10-25 PROCEDURE — B518ZZA FLUOROSCOPY OF SUPERIOR VENA CAVA, GUIDANCE: ICD-10-PCS | Performed by: INTERNAL MEDICINE

## 2018-10-25 PROCEDURE — 76937 US GUIDE VASCULAR ACCESS: CPT

## 2018-10-25 PROCEDURE — C1751 CATH, INF, PER/CENT/MIDLINE: HCPCS

## 2018-10-25 PROCEDURE — 2500000003 HC RX 250 WO HCPCS: Performed by: INTERNAL MEDICINE

## 2018-10-25 PROCEDURE — 84484 ASSAY OF TROPONIN QUANT: CPT

## 2018-10-25 PROCEDURE — 83735 ASSAY OF MAGNESIUM: CPT

## 2018-10-25 PROCEDURE — 5A1D70Z PERFORMANCE OF URINARY FILTRATION, INTERMITTENT, LESS THAN 6 HOURS PER DAY: ICD-10-PCS | Performed by: INTERNAL MEDICINE

## 2018-10-25 PROCEDURE — 85730 THROMBOPLASTIN TIME PARTIAL: CPT

## 2018-10-25 PROCEDURE — 82803 BLOOD GASES ANY COMBINATION: CPT

## 2018-10-25 PROCEDURE — 85610 PROTHROMBIN TIME: CPT

## 2018-10-25 PROCEDURE — 6360000002 HC RX W HCPCS: Performed by: INTERNAL MEDICINE

## 2018-10-25 PROCEDURE — 2500000003 HC RX 250 WO HCPCS: Performed by: EMERGENCY MEDICINE

## 2018-10-25 PROCEDURE — 99291 CRITICAL CARE FIRST HOUR: CPT | Performed by: INTERNAL MEDICINE

## 2018-10-25 PROCEDURE — 82947 ASSAY GLUCOSE BLOOD QUANT: CPT

## 2018-10-25 PROCEDURE — 51700 IRRIGATION OF BLADDER: CPT

## 2018-10-25 PROCEDURE — 8010000000 HC HEMODIALYSIS ACUTE INPT

## 2018-10-25 PROCEDURE — 2580000003 HC RX 258: Performed by: INTERNAL MEDICINE

## 2018-10-25 PROCEDURE — 2700000000 HC OXYGEN THERAPY PER DAY

## 2018-10-25 PROCEDURE — 36592 COLLECT BLOOD FROM PICC: CPT

## 2018-10-25 PROCEDURE — 36600 WITHDRAWAL OF ARTERIAL BLOOD: CPT

## 2018-10-25 PROCEDURE — 2580000003 HC RX 258: Performed by: FAMILY MEDICINE

## 2018-10-25 PROCEDURE — 02HV33Z INSERTION OF INFUSION DEVICE INTO SUPERIOR VENA CAVA, PERCUTANEOUS APPROACH: ICD-10-PCS | Performed by: INTERNAL MEDICINE

## 2018-10-25 PROCEDURE — 84100 ASSAY OF PHOSPHORUS: CPT

## 2018-10-25 PROCEDURE — 6370000000 HC RX 637 (ALT 250 FOR IP): Performed by: INTERNAL MEDICINE

## 2018-10-25 PROCEDURE — 80053 COMPREHEN METABOLIC PANEL: CPT

## 2018-10-25 PROCEDURE — 2580000003 HC RX 258: Performed by: EMERGENCY MEDICINE

## 2018-10-25 PROCEDURE — C9113 INJ PANTOPRAZOLE SODIUM, VIA: HCPCS | Performed by: FAMILY MEDICINE

## 2018-10-25 PROCEDURE — 94660 CPAP INITIATION&MGMT: CPT

## 2018-10-25 PROCEDURE — 2100000000 HC CCU R&B

## 2018-10-25 PROCEDURE — 84132 ASSAY OF SERUM POTASSIUM: CPT

## 2018-10-25 PROCEDURE — 85025 COMPLETE CBC W/AUTO DIFF WBC: CPT

## 2018-10-25 PROCEDURE — 80202 ASSAY OF VANCOMYCIN: CPT

## 2018-10-25 PROCEDURE — 82948 REAGENT STRIP/BLOOD GLUCOSE: CPT

## 2018-10-25 PROCEDURE — 36569 INSJ PICC 5 YR+ W/O IMAGING: CPT

## 2018-10-25 PROCEDURE — 71045 X-RAY EXAM CHEST 1 VIEW: CPT

## 2018-10-25 PROCEDURE — 99232 SBSQ HOSP IP/OBS MODERATE 35: CPT | Performed by: SURGERY

## 2018-10-25 PROCEDURE — 2580000003 HC RX 258: Performed by: PHYSICIAN ASSISTANT

## 2018-10-25 PROCEDURE — 99231 SBSQ HOSP IP/OBS SF/LOW 25: CPT | Performed by: INTERNAL MEDICINE

## 2018-10-25 PROCEDURE — 6360000002 HC RX W HCPCS: Performed by: FAMILY MEDICINE

## 2018-10-25 PROCEDURE — 83605 ASSAY OF LACTIC ACID: CPT

## 2018-10-25 RX ORDER — DEXTROSE MONOHYDRATE 50 MG/ML
100 INJECTION, SOLUTION INTRAVENOUS PRN
Status: DISCONTINUED | OUTPATIENT
Start: 2018-10-25 | End: 2018-10-28

## 2018-10-25 RX ORDER — LINEZOLID 2 MG/ML
600 INJECTION, SOLUTION INTRAVENOUS EVERY 12 HOURS
Status: DISCONTINUED | OUTPATIENT
Start: 2018-10-25 | End: 2018-10-29

## 2018-10-25 RX ORDER — NICOTINE POLACRILEX 4 MG
15 LOZENGE BUCCAL PRN
Status: DISCONTINUED | OUTPATIENT
Start: 2018-10-25 | End: 2018-11-02 | Stop reason: HOSPADM

## 2018-10-25 RX ORDER — DEXTROSE MONOHYDRATE 25 G/50ML
12.5 INJECTION, SOLUTION INTRAVENOUS PRN
Status: DISCONTINUED | OUTPATIENT
Start: 2018-10-25 | End: 2018-11-02 | Stop reason: HOSPADM

## 2018-10-25 RX ORDER — DEXTROSE MONOHYDRATE 25 G/50ML
12.5 INJECTION, SOLUTION INTRAVENOUS PRN
Status: DISCONTINUED | OUTPATIENT
Start: 2018-10-25 | End: 2018-10-30 | Stop reason: SDUPTHER

## 2018-10-25 RX ORDER — NICOTINE POLACRILEX 4 MG
15 LOZENGE BUCCAL PRN
Status: DISCONTINUED | OUTPATIENT
Start: 2018-10-25 | End: 2018-10-30 | Stop reason: SDUPTHER

## 2018-10-25 RX ADMIN — VANCOMYCIN HYDROCHLORIDE 1500 MG: 1 INJECTION, POWDER, LYOPHILIZED, FOR SOLUTION INTRAVENOUS at 16:36

## 2018-10-25 RX ADMIN — NOREPINEPHRINE BITARTRATE 25 MCG/MIN: 1 INJECTION INTRAVENOUS at 14:15

## 2018-10-25 RX ADMIN — LINEZOLID 600 MG: 600 INJECTION, SOLUTION INTRAVENOUS at 19:13

## 2018-10-25 RX ADMIN — CEFEPIME 1 G: 1 INJECTION, POWDER, FOR SOLUTION INTRAMUSCULAR; INTRAVENOUS at 11:39

## 2018-10-25 RX ADMIN — SODIUM CHLORIDE: 9 INJECTION, SOLUTION INTRAVENOUS at 03:59

## 2018-10-25 RX ADMIN — METRONIDAZOLE 500 MG: 500 INJECTION, SOLUTION INTRAVENOUS at 17:37

## 2018-10-25 RX ADMIN — CALCIUM GLUCONATE 1 G: 98 INJECTION, SOLUTION INTRAVENOUS at 19:15

## 2018-10-25 RX ADMIN — METRONIDAZOLE 500 MG: 500 INJECTION, SOLUTION INTRAVENOUS at 23:59

## 2018-10-25 RX ADMIN — NOREPINEPHRINE BITARTRATE 20 MCG/MIN: 1 INJECTION INTRAVENOUS at 22:33

## 2018-10-25 RX ADMIN — PANTOPRAZOLE SODIUM 40 MG: 40 INJECTION, POWDER, FOR SOLUTION INTRAVENOUS at 11:40

## 2018-10-25 RX ADMIN — SODIUM CHLORIDE 4.74 UNITS/HR: 9 INJECTION, SOLUTION INTRAVENOUS at 19:23

## 2018-10-25 RX ADMIN — Medication 10 ML: at 11:46

## 2018-10-25 RX ADMIN — Medication 10 ML: at 19:30

## 2018-10-25 RX ADMIN — METRONIDAZOLE 500 MG: 500 INJECTION, SOLUTION INTRAVENOUS at 11:45

## 2018-10-25 ASSESSMENT — ENCOUNTER SYMPTOMS
SHORTNESS OF BREATH: 1
ROS SKIN COMMENTS: LEFT KNEE SURGICAL INCISION
COUGH: 0
EYE PAIN: 0
DIARRHEA: 0
BACK PAIN: 0
GASTROINTESTINAL NEGATIVE: 1
WHEEZING: 0
NAUSEA: 0
VOMITING: 0
EYE REDNESS: 0
ABDOMINAL PAIN: 0
BLOOD IN STOOL: 0
SORE THROAT: 0
EYES NEGATIVE: 1
EYE DISCHARGE: 0
CONSTIPATION: 0

## 2018-10-25 ASSESSMENT — PAIN SCALES - GENERAL
PAINLEVEL_OUTOF10: 0
PAINLEVEL_OUTOF10: 0

## 2018-10-25 NOTE — PROGRESS NOTES
and 0.45 % sodium chloride infusion   Intravenous Continuous PRN Geronimo Granados MD        insulin regular (HUMULIN R;NOVOLIN R) 100 Units in sodium chloride 0.9 % 100 mL infusion  0.1 Units/kg/hr Intravenous Continuous Geronimo Granados MD   Stopped at 10/24/18 1335    0.9 % sodium chloride bolus  500 mL Intravenous Once Geronimo Granados MD   Stopped at 10/24/18 0624    0.9 % sodium chloride bolus  250 mL Intravenous Once Geronimo Granados MD 20 mL/hr at 10/24/18 0912 250 mL at 10/24/18 0912    lidocaine PF 1 % injection 5 mL  5 mL Intradermal Once [employer], PA-C        sodium chloride flush 0.9 % injection 10 mL  10 mL Intravenous 2 times per day [employer], PA-C   10 mL at 10/24/18 0755    sodium chloride flush 0.9 % injection 10 mL  10 mL Intravenous PRN [employer], PA-C        metronidazole (FLAGYL) 500 mg in NaCl 100 mL IVPB premix  500 mg Intravenous Q8H Glen Dejesus  mL/hr at 10/24/18 1913 500 mg at 10/24/18 1913    phytonadione (VITAMIN K) tablet 5 mg  5 mg Oral Once ELISABETH Tolbert        vancomycin (VANCOCIN) intermittent dosing (placeholder)   Other RX Placeholder Glen Dejesus MD        0.9 % sodium chloride infusion   Intravenous Continuous Glen Dejesus  mL/hr at 10/24/18 1915      norepinephrine (LEVOPHED) 16 mg in dextrose 5 % 250 mL infusion  5 mcg/min Intravenous Continuous Juanis Ochoa MD 23.4 mL/hr at 10/24/18 1600 25 mcg/min at 10/24/18 1600    DOPamine (INTROPIN) 400 mg in dextrose 5 % 250 mL infusion  5 mcg/kg/min Intravenous Continuous Juanis Ochoa MD   Stopped at 10/24/18 1004    pantoprazole (PROTONIX) injection 40 mg  40 mg Intravenous Daily Thang Orantes, DO   40 mg at 10/24/18 0753    And    sodium chloride (PF) 0.9 % injection 10 mL  10 mL Intravenous Daily [place] Orantes, DO   10 mL at 10/24/18 0755    vasopressin 20 Units in dextrose 5 % 100 mL infusion  0.03 Units/min Intravenous Continuous Pineda Whittington DO   Stopped at 10/24/18 0242    Ultrasound-guided access of right common femoral artery. Aortogram.Left lower extremity arteriogram.Atherectomy and angioplasty of left superficial femoral artery. Radiologic supervision and interpretation.  VASCULAR SURGERY  01/18/2013    Margarito Babcock Aortogram.Multistation arteriogram right lower extremity. Laser atherectomy and angioplasty of right superficial femoral artery. Selective catheterization of right tibioperoneal trunk. Angioplasty of peroneal artery and tibioperoneal trunk.      Family History   Problem Relation Age of Onset    Colon Cancer Father     Diabetes Brother     Colon Polyps Neg Hx     Liver Cancer Neg Hx     Liver Disease Neg Hx     Esophageal Cancer Neg Hx     Rectal Cancer Neg Hx     Stomach Cancer Neg Hx      Social History   Substance Use Topics    Smoking status: Former Smoker     Quit date: 6/3/2003    Smokeless tobacco: Never Used    Alcohol use 7.2 oz/week     12 Glasses of wine per week      Comment: 2 glasses of wine every night          Review of Systems:    General:      Complaint / Symptom Yes / No / Description if Yes       Fatigue Yes:  chronic   Weight gain NA   Insomnia NA       Respiratory:        Complaint / Symptom Yes / No / Description if Yes       Cough No   Horseness NA       Cardiovascular:    Complaint / Symptom Yes / No / Description if Yes       Chest Pain N/A   Shortness of Air / Orthopnea Yes: chronic and severe   Presyncope / Syncope No   Palpitations No         Objective:    BP (!) 102/41   Pulse 64   Temp 97.2 °F (36.2 °C)   Resp 26   Ht 6' (1.829 m)   Wt 268 lb (121.6 kg)   SpO2 97%   BMI 36.35 kg/m² ,   Intake/Output Summary (Last 24 hours) at 10/24/18 2118  Last data filed at 10/24/18 2000   Gross per 24 hour   Intake          4995.71 ml   Output             1150 ml   Net          3845.71 ml       GENERAL - well developed and well nourished, is not an active participant in this examination  HEENT -  PERRLA, Hearing not tested, PLANS:    1. Continue present medications except for changes as noted above  2. Continue to monitor rhythm  3. Further orders per clinical course. 4. Looks poorly           Discussed with nursing.     Electronically signed by Edil Martinez MD on 10/24/18        [facility]

## 2018-10-25 NOTE — PROGRESS NOTES
Recent Labs      10/24/18   2030  10/25/18   0320   BUN  43*  51*       Recent Labs      10/24/18   2030  10/25/18   0320   CREATININE  4.0*  4.5*       Estimated Creatinine Clearance: 19 mL/min (A) (based on SCr of 4.5 mg/dL (H)). Plan: Hemodialysis initiated 10/24 and reordered again today. Adjust dose of Cefepime to 1gm IV q24hr for HD (give after HD treatment).     Electronically signed by Micheal Mauricio, [facility] on 10/25/2018 at 9:58 AM

## 2018-10-25 NOTE — PROGRESS NOTES
Nephrology ([facility]) Progress Note      Patient:  Agatha Muller  YOB: 1940  Date of Service: 10/25/2018  MRN: [id number]   Acct: [de-identified]   Primary Care Physician: Tiara Moore MD  Advance Directive: Full Code  Admit Date: 10/23/2018       Hospital Day: 2  Referring Provider: Blaire Goncalves MD    Patient independently seen and examined, Chart, Consults, Notes, Operative notes, Labs, Cardiology, and Radiology studies reviewed as able. Chief complaint: Abnormal lab. Subjective:  Agatha Muller is a 69 y.o. male  whom we were consulted for acute kidney injury/metabolic acidosis/hyperkalemia. Patient denies any history of chronic kidney disease. He presented with severe weakness, lack of energy and encephalopathy. On presentation his blood pressure was very low, diagnosed with septic shock secondary to bad gallbladder and urinary tract infection. Patient was also given intravenous contrast twice in the ER 1st for CT angiogram of the pulmonary arteries followed by CT scan of the abdomen and pelvis. Hospital course remarkable for IV fluid resuscitation, initially requiring 3 pressors, IV antibiotics. His renal function continued to deteriorate. On October 24, he underwent right femoral hemodialysis catheter insertion followed by 1st treatment. This morning he is more alert and awake and off BiPAP. He was seen in CCU today. Currently seen on hemodialysis  Hemodialysis: 3-1/2 hour  Access: Right femoral line  Ultrafiltration: 2000 mL  2K bath  Blood flow rate is 350 mL/m.     Allergies:  Promethazine hcl    Medicines:  Current Facility-Administered Medications   Medication Dose Route Frequency Provider Last Rate Last Dose    cefepime (MAXIPIME) 1 g in sodium chloride (PF) 10 mL IV syringe  1 g Intravenous Q24H Thang Orantes DO        glucose (GLUTOSE) 40 % oral gel 15 g  15 g Oral PRN Blaire Goncalves MD        dextrose 50 % solution 12.5 g  12.5 g Intravenous PRN Dameon Rivera MD        glucagon (rDNA) injection 1 mg  1 mg Intramuscular PRN Dameon Rivera MD        dextrose 5 % solution  100 mL/hr Intravenous PRN Dameon Rivera MD        dextrose 50 % solution 12.5 g  12.5 g Intravenous PRN Janey Hdz MD        magnesium sulfate 1 g in dextrose 5% 100 mL IVPB  1 g Intravenous PRN Zaire Richardson MD        dextrose 5 % and 0.45 % sodium chloride infusion   Intravenous Continuous PRN Janey Hdz MD        0.9 % sodium chloride bolus  500 mL Intravenous Once Janey Hdz MD   Stopped at 10/24/18 0624    lidocaine PF 1 % injection 5 mL  5 mL Intradermal Once [employer], PA-C        sodium chloride flush 0.9 % injection 10 mL  10 mL Intravenous 2 times per day [employer], PA-C   10 mL at 10/24/18 2224    sodium chloride flush 0.9 % injection 10 mL  10 mL Intravenous PRN [employer], PA-C        metronidazole (FLAGYL) 500 mg in NaCl 100 mL IVPB premix  500 mg Intravenous Q8H Dameon Rivera MD   Stopped at 10/25/18 0039    phytonadione (VITAMIN K) tablet 5 mg  5 mg Oral Once ELISABETH Tolbert        vancomycin (VANCOCIN) intermittent dosing (placeholder)   Other RX Placeholder Dameon Rivera MD        0.9 % sodium chloride infusion   Intravenous Continuous Dameon Rivera  mL/hr at 10/25/18 0730      insulin lispro (HUMALOG) injection vial 0-6 Units  0-6 Units Subcutaneous TID WC ELISABETH Santos        insulin lispro (HUMALOG) injection vial 0-3 Units  0-3 Units Subcutaneous Nightly ELISABETH Santos   2 Units at 10/24/18 2219    norepinephrine (LEVOPHED) 16 mg in dextrose 5 % 250 mL infusion  5 mcg/min Intravenous Continuous Inez Love MD 23.4 mL/hr at 10/25/18 0730 25 mcg/min at 10/25/18 0730    DOPamine (INTROPIN) 400 mg in dextrose 5 % 250 mL infusion  5 mcg/kg/min Intravenous Continuous Inez Love MD   Stopped at 10/24/18 1004    pantoprazole (PROTONIX) injection 40 mg  40 mg Intravenous Daily Thang Orantes, DO   40 mg at 10/24/18 8563    And    sodium chloride (PF) 0.9 % injection 10 mL  10 mL Intravenous Daily Thang Orantes, DO   10 mL at 10/24/18 0755    vasopressin 20 Units in dextrose 5 % 100 mL infusion  0.03 Units/min Intravenous Continuous Cesar Riverside, DO   Stopped at 10/24/18 0242    sodium polystyrene (KAYEXALATE) 15 GM/60ML suspension 15 g  15 g Rectal Once Cesar Riverside, DO           Past Medical History:  Past Medical History:   Diagnosis Date    Acute liver failure without hepatic coma 10/23/2018    Back pain     \"with tired legs as a result\"    Blood circulation, collateral     Carotid arterial disease ([facility] [address]     recent surgery    CHF (congestive heart failure) ([facility] [address] 10/23/2018    CKD (chronic kidney disease), 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/2018    GERD (gastroesophageal reflux disease)     Hyperlipidemia     Hypertension     Hypertension     Palliative care patient 10/23/2018    Primary osteoarthritis of left knee 10/14/2018    PVD (peripheral vascular disease) [facility])        Past Surgical History:  Past Surgical History:   Procedure Laterality Date    BACK SURGERY      COLONOSCOPY  2007?  LA COLONOSCOPY FLX DX W/COLLJ SPEC WHEN PFRMD N/A 9/11/2017    Dr Long Murrell internal hemorrhoids, diverticular disease-HP-No recall (age)   Criss Mora LA REVISE MEDIAN N/CARPAL TUNNEL SURG Left 7/18/2018    OPEN CARPAL TUNNEL RELEASE performed by Gerard Greer MD at [address] Left 8/28/2018    LEFT CAROTID ENDARTERECTOMY WITH VEIN PATCH ANGIOPLASTY AND COMPLETION ANGIOGRAM performed by Kim Dahl MD at [address] [facility] Left 10/15/2018    LEFT COMPLEX TOTAL KNEE ARTHROPLASTY performed by Gerard Greer MD at [facility] VASCULAR SURGERY  04/21/2015    Antonia BESS Ultrasound guided access of left common femoral artery. Aortogram.Diagnostic right lower extremity arteriogram.Radiologic supervision with the patient and negative except as noted above and in the HPI unless unable to obtain. Objective:  Blood pressure (!) 114/47, pulse 62, temperature 98.3 °F (36.8 °C), temperature source Temporal, resp. rate 22, height 6' (1.829 m), weight 290 lb 1.6 oz (131.6 kg), SpO2 96 %. Intake/Output Summary (Last 24 hours) at 10/25/18 1052  Last data filed at 10/25/18 0730   Gross per 24 hour   Intake          4079.74 ml   Output              850 ml   Net          3229.74 ml     General: awake/alert   HEENT: Normocephalic atraumatic head  Neck: Supple with no JVD  Chest:  clear to auscultation bilaterally without respiratory distress  CVS: regular rate and rhythm  Abdominal: soft, nontender, normal bowel sounds  Extremities: no cyanosis or edema  Skin: warm and dry without rash      Labs:  BMP:   Recent Labs      10/24/18   0855  10/24/18   1520   10/24/18   2030  10/25/18   0320  10/25/18   0440   NA  134*  136   --   133*  133*   --    K  4.9  3.7   < >  4.0  4.1  3.9   CL  86*  88*   --   86*  87*   --    CO2  22  26   --   31*  28   --    PHOS  8.6*  5.2*   --   5.6*   --    --    BUN  60*  40*   --   43*  51*   --    CREATININE  5.2*  3.5*   --   4.0*  4.5*   --    CALCIUM  6.8*  7.5*   --   6.9*  7.1*   --     < > = values in this interval not displayed. CBC:   Recent Labs      10/24/18   0511  10/24/18   1600  10/25/18   0320   WBC  33.1*  27.1*  21.3*   HGB  7.3*  8.5*  8.0*   HCT  24.2*  25.5*  24.7*   MCV  98.0*  89.5  90.1   PLT  179  170  144     LIVER PROFILE:   Recent Labs      10/23/18   0420   10/24/18   1520  10/24/18   2030  10/25/18   0320   AST  4,211*   < >  >7,000*  >7,000*  >7,000*   ALT  2,287*   < >  6,278*  5,245*  4,709*   LIPASE  72*   --    --    --    --    BILIDIR   --    --   3.9*  4.2*   --    BILITOT  3.8*   < >  5.8*  6.1*  6.4*   ALKPHOS  190*   < >  206*  191*  189*    < > = values in this interval not displayed.      PT/INR:   Recent Labs      10/23/18   0420  10/24/18   9109 10/25/18   0320   PROTIME  29.8*  59.7*  32.3*   INR  2.82*  6.77*  3.12*     APTT:   Recent Labs      10/23/18   0420  10/24/18   0511  10/25/18   0320   APTT  38.5*  41.7*  37.1*     BNP:  No results for input(s): BNP in the last 72 hours. Ionized Calcium:No results for input(s): IONCA in the last 72 hours. Magnesium:  Recent Labs      10/24/18   0855  10/24/18   1520  10/24/18   2030   MG  2.3  2.1  2.0     Phosphorus:  Recent Labs      10/24/18   0855  10/24/18   1520  10/24/18   2030   PHOS  8.6*  5.2*  5.6*     HgbA1C: No results for input(s): LABA1C in the last 72 hours. Hepatic:   Recent Labs      10/24/18   1520  10/24/18   2030  10/25/18   0320   ALKPHOS  206*  191*  189*   ALT  6,278*  5,245*  4,709*   AST  >7,000*  >7,000*  >7,000*   PROT  5.5*  5.0*  5.0*   BILITOT  5.8*  6.1*  6.4*   BILIDIR  3.9*  4.2*   --    LABALBU  3.2*  2.9*  2.9*     Lactic Acid:   Recent Labs      10/24/18   0950   LACTA  8.1*     Troponin:   Recent Labs      10/23/18   1016   CKTOTAL  541*     ABGs: No results for input(s): PH, PCO2, PO2, HCO3, O2SAT in the last 72 hours. CRP:  No results for input(s): CRP in the last 72 hours. Sed Rate:  No results for input(s): SEDRATE in the last 72 hours. Cultures:   No results for input(s): CULTURE in the last 72 hours. Recent Labs      10/23/18   0540  10/23/18   1015   BC   --   No Growth to date. Any change in status will be called. BLOODCULT2  No Growth to date. Any change in status will be called. --      No results for input(s): CXSURG in the last 72 hours. Radiology reports as per the Radiologist  Radiology: Us Renal Complete    Result Date: 10/23/2018  EXAMINATION: US RENAL COMPLETE 10/23/2018 5:53 PM HISTORY: Acute renal failure. Gross hematuria. Report: Sonographic images of the kidneys were obtained, comparison is made with the previous ultrasound 10/17/2018.   The right kidney measures 12.0 x 4.9 x 4.7 cm, at the inferior pole, there is a benign-appearing Signed by Dr Boni Jackson on 10/23/2018 7:38 AM    Xr Chest Portable    Result Date: 10/24/2018  EXAMINATION: XR CHEST PORTABLE 10/24/2018 8:12 AM HISTORY: Pleural effusion, possible pneumonia COMPARISON: 10/23/2018 FINDINGS: The heart and mediastinal contours are stable. The aorta is tortuous with atherosclerotic calcifications. The pulmonary vasculature remains somewhat indistinct, though there has been improvement in the bilateral perihilar opacities. Airspace opacities persist at the lung bases, possibly atelectasis. There is no appreciable pneumothorax. Improving pulmonary edema and pulmonary vascular congestion. Bibasilar atelectasis. Signed by Dr Cayden Melo on 10/24/2018 8:14 AM    Xr Chest Portable    Result Date: 10/23/2018  Examination. XR CHEST PORTABLE History: CVC insertion. A frontal portable upright view of the chest is compared with the previous study obtained earlier today. No interval change. There is persistent pulmonary vascular congestion and pulmonary edema. There are atelectatic changes in the right lower lung. There is moderate cardiomegaly. Atheromatous changes of thoracic aorta are noted. No pneumothorax. Postsurgical changes of the right shoulder are noted. No change. Persistent pulmonary vascular congestion and pulmonary edema. Discoid atelectasis right lower lung. No pneumothorax. The above finding are recorded on a digital voice clip in PACS. Signed by Dr Boni Jackson on 10/23/2018 7:45 AM    Xr Chest Portable    Result Date: 10/23/2018  EXAMINATION: XR CHEST PORTABLE 10/23/2018 7:17 AM HISTORY: Irregular heart rate COMPARISON: 8/20/2018 FINDINGS: The heart is magnified but felt to be normal in size. The aorta is tortuous with atherosclerotic calcifications. The pulmonary vasculature is indistinct, and there are subtle perihilar opacities bilaterally. Opacities extend into the right lung base. A layering right pleural effusion is suspected.  There is no appreciable pneumothorax. Suspect pulmonary vascular congestion with right greater than left basilar atelectasis. Layering right pleural effusion suspected. Signed by Dr Jeralyn Schilder on 10/23/2018 7:19 AM    Cta Pulmonary W Contrast    Result Date: 10/23/2018  CTA PULMONARY W CONTRAST 10/23/2018 4:00 AM HISTORY: COMPARISON: None. DLP: 863 mGy cm TECHNIQUE: Helical tomographic images of the chest were obtained after the administration of intravenous contrast following angiogram protocol. Additionally, 3D MIP reconstructions in the coronal and sagittal planes were provided. FINDINGS:  Pulmonary arteries: There is adequate enhancement of the pulmonary arteries to evaluate for central and segmental pulmonary emboli. There are no filling defects within the main, lobar, segmental or visualized subsegmental pulmonary arteries. . Aorta and great vessels: There is atherosclerosis in the aorta. There is atherosclerosis in the great vessels without evidence of stenosis. Coronary artery calcifications are visualized. Neck base: The imaged portion of the base of the neck appears unremarkable. Lungs: The lungs are clear. Bilateral pleural effusions are present slightly greater on the right than the left. Bibasilar atelectasis is also noted. The trachea and bronchial tree are patent. Heart: The heart is normal in size. There is no pericardial effusion. Lymph nodes: No pathologically enlarged mediastinal, hilar, or axillary lymph nodes are present. Bones and soft tissues: The osseous structures of the thorax and surrounding soft tissues demonstrate no acute process. Upper abdomen: The imaged portion of the upper abdomen demonstrates no acute process. 1. No evidence of embolic disease. 2. Bilateral pleural effusions are noted. 3. Bibasilar dependent atelectasis. Signed by Dr Elizabeth Powell on 10/23/2018 7:39 AM       Assessment   1. Acute kidney injury/ATN/normochromic oliguric. Currently seen on hemodialysis.   2. Severe metabolic acidosis. 3. Hyperkalemia. 4. Septic shock. 5. Urinary tract infection. 6. ? Cholecystitis. 7. Severe transaminitis/shock liver. 8. Anemia. Plan:  1. Tolerating hemodialysis very well. 2. Continue hemodialysis in the morning.   3. Plan was discussed with Dr. Sobia Lilly MD  10/25/18  10:52 AM

## 2018-10-25 NOTE — PROGRESS NOTES
dextrose 5 % and 0.45 % NaCl      norepinephrine 25 mcg/min (10/25/18 0730)    DOPamine Stopped (10/24/18 1004)    vasopressin infusion Stopped (10/24/18 0242)     PRN Meds:glucose, dextrose, glucagon (rDNA), dextrose, dextrose, magnesium sulfate, dextrose 5 % and 0.45 % NaCl, sodium chloride flush    ALLERGIES:      Promethazine hcl      SOCIAL HISTORY:     TOBACCO:   reports that he quit smoking about 15 years ago. He has never used smokeless tobacco.     ETOH:   reports that he drinks about 7.2 oz of alcohol per week . Patient currently livesAt home with his wife  FAMILY HISTORY:     Family History   Problem Relation Age of Onset    Colon Cancer Father     Diabetes Brother     Colon Polyps Neg Hx     Liver Cancer Neg Hx     Liver Disease Neg Hx     Esophageal Cancer Neg Hx     Rectal Cancer Neg Hx     Stomach Cancer Neg Hx        REVIEW OF SYSTEMS:     See HPI  The patient and wife deny that he has history of any urinary tract infection, prostatitis, pneumonias, recurrent boils. Remainder are negative to questioning    PHYSICAL EXAM:     BP (!) 111/51   Pulse 62   Temp 99.1 °F (37.3 °C) (Temporal)   Resp 17   Ht 6' (1.829 m)   Wt 290 lb 1.6 oz (131.6 kg)   SpO2 96%   BMI 39.34 kg/m²  Temp (24hrs), Av.8 °F (36.6 °C), Min:96.9 °F (36.1 °C), Max:99.1 °F (37.3 °C)    General:  Acutely  ill-appearing lying in bed in no respiratory distress  HEENT: pink conjunctiva, anicteric sclera, very mildly dry oral mucosa. Small conjunctival hemorrhage left sclera. Neck: Supple without meningismus  Chest:  Lungs decreased breath sounds throughout greatest at the bases. Cardiovascular:  Diminished heart tones. Abdomen:  Obese, Soft, non tender to palpation, BS positive  Extremities: Edema bilateral lower extremities left greater than right. Pitting in the pretibial region on the left. Skin:  Warm and dry. Evidence of significant sun damage on his upper extremities.  Left total knee arthroplasty surgical site is clean dry and intact. There is no surrounding erythema. There is no drainage. CNS: Sleepy but arousable when his name is called loudly. Squeeze my right hand to command. Told me his name, originally stated he was in [address] and quickly corrected and said he was in [place] but it was somewhat hard understand his speech. When asked specifically what building he was in he did state \"hospital\"  PSYCH: alert, pleasant and cooperative. IV access:  PICC upper arm right, condition patent and no redness         LABS:     CBC with DIFF:   Recent Labs      10/24/18   0511  10/24/18   1600  10/25/18   0320   WBC  33.1*  27.1*  21.3*   RBC  2.47*  2.85*  2.74*   HGB  7.3*  8.5*  8.0*   HCT  24.2*  25.5*  24.7*   MCV  98.0*  89.5  90.1   MCH  29.6  29.8  29.2   MCHC  30.2*  33.3  32.4*   RDW  13.2  13.4  13.7   PLT  179  170  144   MPV  11.5  11.3  11.5   NEUTOPHILPCT  85.5*  89.4*  89.9*   LYMPHOPCT  5.7*  3.9*  4.2*   MONOPCT  4.9  3.0  3.0   EOSRELPCT  0.0  0.0  0.0   BASOPCT  0.2  0.2  0.1   NEUTROABS  28.3*  24.2*  19.1*   LYMPHSABS  1.9  1.1  0.9*   MONOSABS  1.60*  0.80  0.60   EOSABS  0.00  0.00  0.00   BASOSABS  0.10  0.10  0.00       CMP/BMP:  Recent Labs      10/24/18   2030  10/25/18   0320  10/25/18   0440  10/25/18   1120   NA  133*  133*   --   135*   K  4.0  4.1  3.9  3.9   CL  86*  87*   --   94*   CO2  31*  28   --   27   ANIONGAP  16  18   --   14   GLUCOSE  264*  240*   --   152*   BUN  43*  51*   --   27*   CREATININE  4.0*  4.5*   --   2.8*   LABGLOM  15*  13*   --   22*   CALCIUM  6.9*  7.1*   --   7.4*   PROT  5.0*  5.0*   --   5.1*   LABALBU  2.9*  2.9*   --   3.0*   BILITOT  6.1*  6.4*   --   6.3*   ALKPHOS  191*  189*   --   197*   ALT  5,245*  4,709*   --   4,126*   AST  >7,000*  >7,000*   --   4,835*          Culture:   Recent Labs      10/23/18   0540  10/23/18   1015   BC   --   No Growth to date. Any change in status will be called. BLOODCULT2  No Growth to date.   Any change in status tissues: The osseous structures of the thorax and  surrounding soft tissues demonstrate no acute process. Upper abdomen: The imaged portion of the upper abdomen demonstrates no  acute process.      Impression:       1. No evidence of embolic disease. 2. Bilateral pleural effusions are noted. 3. Bibasilar dependent atelectasis. Signed by Dr Ren Dumont on 10/23/2018 7:39 AM     CT ABDOMEN PELVIS W IV CONTRAST Additional Contrast? None [[id number]] Resulted: 10/23/18 0738     Order Status: Completed Updated: 10/23/18 0741     Narrative:       Examination. CT ABDOMEN PELVIS W IV CONTRAST  History: Abnormal liver function tests. DLP: 164 8mGycm. The CT scan of the abdomen and pelvis is performed without oral or  intravenous contrast enhancement. The images are acquired in axial  plane with subsequent reconstruction in coronal and sagittal planes. The comparison is made with the previous study dated 8/7/2017. There are extensive artifacts which limit the diagnostic yield of the  study. The lung bases included in the study show small bibasilar pleural  effusion, more on the right side and a mild by basilar dependent  atelectasis and compression atelectasis. Atheromatous changes of the  thoracic aorta and coronary arteries are seen. The visualized liver and spleen appear unremarkable. There is moderate diffuse thickening of the gallbladder wall with  small amount of fluid in the gallbladder fossa. No radiopaque calculi  are seen. The common bile duct is not dilated. The pancreas appear normal. The pancreatic duct is not dilated. The adrenal glands bilaterally appear normal.  The kidneys are unremarkable. There is bilateral perirenal fat  infiltration. No evidence of hydronephrosis. The ureters are not  optimally visualized. The urinary bladder is decompressed.  There is an  apparent soft tissue density located posteriorly at the base of the  urinary bladder which may be extrinsic pressure of the hypercholesterolemia    Slow transit constipation    Iron deficiency anemia    CKD (chronic kidney disease), stage II    GERD (gastroesophageal reflux disease)    Septic shock (HCC)    Sepsis (HCC)    Acute liver failure without hepatic coma    Acute kidney injury ([address]    CHF (congestive heart failure) (HCC)    Gross hematuria    Bilateral pleural effusion    Acute respiratory failure with hypoxia (HCC)    Elevated troponin    Coagulopathy (HCC)    Hyperkalemia    Hypochloremia    Metabolic acidosis    Cardiogenic shock (HCC)    Palliative care patient    Shock liver     Critically ill 81-year-old gentleman who apparently was in his usual state of health postoperatively when he left October 18 after a three-day hospital stay for an uneventful left total knee arthroplasty. After going home, his wife states that he had no signs or symptoms of any active or developing process but just woke her up saying he needed to go to the hospital.    He was found to be hypotensive with atrial fibrillation and RVR. Leukocytosis was noted to be quite significant. As well as are markedly elevated transaminitis and acute kidney injury. Lactic acid was elevated and has significantly improved with hydration. He did have hematuria but no significant pyuria or concerns at this point for a urinary source    Chest x-ray was not compatible with pneumonia and he had no signs or symptoms supporting an acute pneumonia either. Potential sources from an infectious standpoint seemed to point towards the right upper quadrant and possibly the gallbladder. However, the patient also did not have any signs or symptoms leading up to this admission suggesting a brewing gallbladder process with the exception of decreased appetite. Certainly the transaminitis could be attributed to profound hypotension but would have expected that she has been over a significant period of time to be that elevated.   Fortunately, liver

## 2018-10-25 NOTE — PROGRESS NOTES
Of concern still remains a distended gallbladder with fluid percutaneous drain for evaluation would  be helpful as a possible source  of his infection        Plan:   Continue current treatment,  will follow    Maik Chilel MD  10/25/2018 8:22 AM

## 2018-10-25 NOTE — PROGRESS NOTES
Hospitalist Progress Note  10/25/2018 9:19 AM  Subjective:   Admit Date: 10/23/2018  PCP: Leisa cK MD    Chief Complaint: hematuria, fatigue    Subjective: Patient now on levophed. More disoriented and somnolent this AM.  However, still able to state location and year. GI suggesting percutaneous drainage for ?cholecystitis as source of sepsis. Cumulative Hospital History:   10/23: Admitted to ICU for septic shock, multiorgan failure. Started on pressors and broad spectrum abx. 10/24: Patient now receiving HD. LFTs appear to be trending down. Continue broad spectrum abx and wean levophed as tolerated. Will discuss with surgery about percutaneous drainage for ?cholecystitis. ROS: 14 point review of systems is negative except as specifically addressed above.     Diet NPO Effective Now    Intake/Output Summary (Last 24 hours) at 10/25/18 0919  Last data filed at 10/25/18 0730   Gross per 24 hour   Intake          4079.74 ml   Output              850 ml   Net          3229.74 ml     Medications:   dextrose 5 % and 0.45 % NaCl      sodium chloride 125 mL/hr at 10/25/18 0730    norepinephrine 25 mcg/min (10/25/18 0730)    DOPamine Stopped (10/24/18 1004)    vasopressin infusion Stopped (10/24/18 0242)     Current Facility-Administered Medications   Medication Dose Route Frequency Provider Last Rate Last Dose    dextrose 50 % solution 12.5 g  12.5 g Intravenous PRN Zaire Richardson MD        magnesium sulfate 1 g in dextrose 5% 100 mL IVPB  1 g Intravenous PRN Zaire Richardson MD        dextrose 5 % and 0.45 % sodium chloride infusion   Intravenous Continuous PRN Zaire Richardson MD        0.9 % sodium chloride bolus  500 mL Intravenous Once Miguel Garnett MD   Stopped at 10/24/18 8424    lidocaine PF 1 % injection 5 mL  5 mL Intradermal Once Marissa Carrillo PA-C        sodium chloride flush 0.9 % injection 10 mL  10 mL Intravenous 2 times per day Marissa Carrillo PA-C   10 mL at 10/24/18 2066 170  144     Recent Labs      10/24/18   1520   10/24/18   2030  10/25/18   0320  10/25/18   0440   NA  136   --   133*  133*   --    K  3.7   < >  4.0  4.1  3.9   ANIONGAP  22*   --   16  18   --    CL  88*   --   86*  87*   --    CO2  26   --   31*  28   --    BUN  40*   --   43*  51*   --    CREATININE  3.5*   --   4.0*  4.5*   --    GLUCOSE  206*   --   264*  240*   --    CALCIUM  7.5*   --   6.9*  7.1*   --     < > = values in this interval not displayed. Recent Labs      10/24/18   0855  10/24/18   1520  10/24/18   2030   MG  2.3  2.1  2.0   PHOS  8.6*  5.2*  5.6*     Recent Labs      10/24/18   1520  10/24/18   2030  10/25/18   0320   AST  >7,000*  >7,000*  >7,000*   ALT  6,278*  5,245*  4,709*   BILITOT  5.8*  6.1*  6.4*   ALKPHOS  206*  191*  189*     ABGs:  Recent Labs      10/23/18   0857  10/23/18   1317  10/24/18   0446  10/24/18   1618  10/25/18   0440   PHART  7.030*  7.140*  7.230*  7.520*  7.430   RAG9IHC  34.0*  34.0*  33.0*  36.0  46.0*   PO2ART  94.0  149.0*  108.0*  78.0*  85.0   POG5RJO  9.0*  11.6*  13.8*  29.4*  30.5*   BEART  -20.4*  -16.2*  -12.7*  6.2*  5.6*   HGBAE  8.5*  8.3*  7.9*  9.1*  8.4*   J3JOGMKN  93.7  97.2  96.1  95.1  95.1   CARBOXHGBART  2.1  2.2  2.4  2.3  2.6   02THERAPY  Unknown  Unknown  Unknown  Unknown  Unknown     HgBA1c: Invalid input(s):  HGBA1C  FLP:    Lab Results   Component Value Date    TRIG 176 10/01/2018    HDL 38 10/01/2018    LDLCALC 72 10/01/2018     TSH:  No results found for: TSH  Troponin T:   Recent Labs      10/24/18   1210  10/24/18   2030  10/25/18   0320   TROPONINI  2.94*  2.99*  2.89*     INR:   Recent Labs      10/23/18   0420  10/24/18   0511  10/25/18   0320   INR  2.82*  6.77*  3.12*       Objective:   Vitals: BP (!) 109/43   Pulse 60   Temp 98.3 °F (36.8 °C) (Temporal)   Resp 20   Ht 6' (1.829 m)   Wt 290 lb 1.6 oz (131.6 kg)   SpO2 96%   BMI 39.34 kg/m²   24HR INTAKE/OUTPUT:      Intake/Output Summary (Last 24 hours) at 10/25/18

## 2018-10-25 NOTE — PROGRESS NOTES
Patient name: Peggy Bae  Patient : 1940  10/25/2018  7:29 AM  ROOM 702    Patient Active Problem List   Diagnosis Code    Diverticulitis of large intestine with perforation without bleeding K57.20    Generalized abdominal pain R10.84    Colonic diverticular abscess K57.20    Bilateral carotid artery stenosis I65.23    Atherosclerosis of native artery of both lower extremities with intermittent claudication ([facility]) I70.213    Carotid stenosis, asymptomatic, left I65.22    Primary osteoarthritis of left knee M17.12    Arthritis of knee M17.10    Essential hypertension I10    Pure hypercholesterolemia E78.00    Slow transit constipation K59.01    Iron deficiency anemia D50.9    CKD (chronic kidney disease), stage II N18.2    GERD (gastroesophageal reflux disease) K21.9    Septic shock ([facility]) A41.9, R65.21    Sepsis ([facility] A41.9    Acute liver failure without hepatic coma K72.00    Acute kidney injury ([facility] N17.9    CHF (congestive heart failure) ([facility]) I50.9    Gross hematuria R31.0    Bilateral pleural effusion J90    Acute respiratory failure with hypoxia ([facility]) J96.01    Elevated troponin R74.8    Coagulopathy ([facility]) D68.9    Hyperkalemia E87.5    Hypochloremia M08.7    Metabolic acidosis M47.3    Cardiogenic shock ([facility]) R57.0    Palliative care patient Z51.5       Subjective: LIethargic. Receiving hemodialysis. HISTORY OF PRESENT ILLNESS:   Mr. Tuan Mays is a 67-year-old  gentleman who presented to [facility] ED on 10/23/2018 after awakening from being diaphoretic and experiencing chills. He also reported experiencing hematuria. Upon evaluation, he was noted to to be in septic shock with multi-organ failure. Paulina Nazario was placed in the critical care unit, requiring respiratory support with BiPAP and pressure support with dopamine and Levophed.     Ashwin had a left total knee replacement on 10/15/2018 by Dr. Scotty Owen.  He was being anticoagulated with Eliquis ascites particularly in the right upper abdomen in the paracolic gutter. Severe chronic degenerative changes of the lumbar spine are seen with a mild levoscoliosis. A small amount of abdominal ascites. Moderate thickening of the wall of the gallbladder with a fluid in the gallbladder fossa may represent acute cholecystitis. No gallstones are noted. This may be clinically correlated and further evaluated. The urinary bladder is decompressed. There is an apparent soft tissue density located posteriorly at the floor of the urinary bladder which may be extrinsic pressure from the enlarged prostate. This may be clinically correlated. A small bibasilar pleural effusion, more on the right side. The severe atheromatous changes of the abdominal aorta and iliac arteries. The above study was initially reviewed and reported by stat rads. I do not find any discrepancies. Signed by Dr Cheli Freeman on 10/23/2018 7:38 AM    Xr Chest Portable    Result Date: 10/24/2018  EXAMINATION: XR CHEST PORTABLE 10/24/2018 8:12 AM HISTORY: Pleural effusion, possible pneumonia COMPARISON: 10/23/2018 FINDINGS: The heart and mediastinal contours are stable. The aorta is tortuous with atherosclerotic calcifications. The pulmonary vasculature remains somewhat indistinct, though there has been improvement in the bilateral perihilar opacities. Airspace opacities persist at the lung bases, possibly atelectasis. There is no appreciable pneumothorax. Improving pulmonary edema and pulmonary vascular congestion. Bibasilar atelectasis. Signed by Dr Oswaldo Pandey on 10/24/2018 8:14 AM    Xr Chest Portable    Result Date: 10/23/2018  Examination. XR CHEST PORTABLE History: CVC insertion. A frontal portable upright view of the chest is compared with the previous study obtained earlier today. No interval change. There is persistent pulmonary vascular congestion and pulmonary edema. There are atelectatic changes in the right lower lung.  There is moderate

## 2018-10-26 LAB
ALBUMIN SERPL-MCNC: 2.6 G/DL (ref 3.5–5.2)
ALBUMIN SERPL-MCNC: 2.6 G/DL (ref 3.5–5.2)
ALBUMIN SERPL-MCNC: 2.8 G/DL (ref 3.5–5.2)
ALBUMIN SERPL-MCNC: 3.2 G/DL (ref 3.5–5.2)
ALP BLD-CCNC: 159 U/L (ref 40–130)
ALP BLD-CCNC: 176 U/L (ref 40–130)
ALP BLD-CCNC: 178 U/L (ref 40–130)
ALP BLD-CCNC: 191 U/L (ref 40–130)
ALT SERPL-CCNC: 2418 U/L (ref 5–41)
ALT SERPL-CCNC: 2674 U/L (ref 5–41)
ALT SERPL-CCNC: 2731 U/L (ref 5–41)
ALT SERPL-CCNC: 2981 U/L (ref 5–41)
ANION GAP SERPL CALCULATED.3IONS-SCNC: 12 MMOL/L (ref 7–19)
ANION GAP SERPL CALCULATED.3IONS-SCNC: 13 MMOL/L (ref 7–19)
ANION GAP SERPL CALCULATED.3IONS-SCNC: 14 MMOL/L (ref 7–19)
APTT: 30.5 SEC (ref 26–36.2)
AST SERPL-CCNC: 1486 U/L (ref 5–40)
AST SERPL-CCNC: 1507 U/L (ref 5–40)
AST SERPL-CCNC: 1970 U/L (ref 5–40)
AST SERPL-CCNC: 2328 U/L (ref 5–40)
BASOPHILS ABSOLUTE: 0 K/UL (ref 0–0.2)
BASOPHILS RELATIVE PERCENT: 0.1 % (ref 0–1)
BETA-HYDROXYBUTYRATE: 4.8 MG/DL (ref 0–3)
BILIRUB SERPL-MCNC: 6.1 MG/DL (ref 0.2–1.2)
BILIRUB SERPL-MCNC: 6.1 MG/DL (ref 0.2–1.2)
BILIRUB SERPL-MCNC: 6.2 MG/DL (ref 0.2–1.2)
BILIRUB SERPL-MCNC: 8.2 MG/DL (ref 0.2–1.2)
BLOOD BANK DISPENSE STATUS: NORMAL
BLOOD BANK PRODUCT CODE: NORMAL
BPU ID: NORMAL
BUN BLDV-MCNC: 20 MG/DL (ref 8–23)
BUN BLDV-MCNC: 20 MG/DL (ref 8–23)
BUN BLDV-MCNC: 32 MG/DL (ref 8–23)
BUN BLDV-MCNC: 41 MG/DL (ref 8–23)
BUN BLDV-MCNC: 45 MG/DL (ref 8–23)
BUN BLDV-MCNC: 46 MG/DL (ref 8–23)
BUN BLDV-MCNC: 46 MG/DL (ref 8–23)
CALCIUM SERPL-MCNC: 7.4 MG/DL (ref 8.8–10.2)
CALCIUM SERPL-MCNC: 7.5 MG/DL (ref 8.8–10.2)
CALCIUM SERPL-MCNC: 7.6 MG/DL (ref 8.8–10.2)
CALCIUM SERPL-MCNC: 7.7 MG/DL (ref 8.8–10.2)
CALCIUM SERPL-MCNC: 7.8 MG/DL (ref 8.8–10.2)
CALCIUM SERPL-MCNC: 7.9 MG/DL (ref 8.8–10.2)
CALCIUM SERPL-MCNC: 7.9 MG/DL (ref 8.8–10.2)
CHLORIDE BLD-SCNC: 93 MMOL/L (ref 98–111)
CHLORIDE BLD-SCNC: 94 MMOL/L (ref 98–111)
CHLORIDE BLD-SCNC: 95 MMOL/L (ref 98–111)
CHLORIDE BLD-SCNC: 96 MMOL/L (ref 98–111)
CHLORIDE BLD-SCNC: 98 MMOL/L (ref 98–111)
CO2: 25 MMOL/L (ref 22–29)
CO2: 27 MMOL/L (ref 22–29)
CO2: 29 MMOL/L (ref 22–29)
CO2: 31 MMOL/L (ref 22–29)
CO2: 31 MMOL/L (ref 22–29)
CREAT SERPL-MCNC: 2.2 MG/DL (ref 0.5–1.2)
CREAT SERPL-MCNC: 2.2 MG/DL (ref 0.5–1.2)
CREAT SERPL-MCNC: 3.5 MG/DL (ref 0.5–1.2)
CREAT SERPL-MCNC: 4.4 MG/DL (ref 0.5–1.2)
CREAT SERPL-MCNC: 4.6 MG/DL (ref 0.5–1.2)
CREAT SERPL-MCNC: 4.6 MG/DL (ref 0.5–1.2)
CREAT SERPL-MCNC: 4.8 MG/DL (ref 0.5–1.2)
DESCRIPTION BLOOD BANK: NORMAL
EOSINOPHILS ABSOLUTE: 0 K/UL (ref 0–0.6)
EOSINOPHILS RELATIVE PERCENT: 0.1 % (ref 0–5)
GFR NON-AFRICAN AMERICAN: 12
GFR NON-AFRICAN AMERICAN: 13
GFR NON-AFRICAN AMERICAN: 17
GFR NON-AFRICAN AMERICAN: 29
GFR NON-AFRICAN AMERICAN: 29
GLUCOSE BLD-MCNC: 104 MG/DL (ref 70–99)
GLUCOSE BLD-MCNC: 104 MG/DL (ref 70–99)
GLUCOSE BLD-MCNC: 110 MG/DL (ref 70–99)
GLUCOSE BLD-MCNC: 110 MG/DL (ref 74–109)
GLUCOSE BLD-MCNC: 129 MG/DL (ref 74–109)
GLUCOSE BLD-MCNC: 137 MG/DL (ref 70–99)
GLUCOSE BLD-MCNC: 138 MG/DL (ref 70–99)
GLUCOSE BLD-MCNC: 143 MG/DL (ref 70–99)
GLUCOSE BLD-MCNC: 146 MG/DL (ref 70–99)
GLUCOSE BLD-MCNC: 151 MG/DL (ref 74–109)
GLUCOSE BLD-MCNC: 152 MG/DL (ref 74–109)
GLUCOSE BLD-MCNC: 95 MG/DL (ref 70–99)
GLUCOSE BLD-MCNC: 95 MG/DL (ref 70–99)
GLUCOSE BLD-MCNC: 97 MG/DL (ref 74–109)
GLUCOSE BLD-MCNC: 98 MG/DL (ref 70–99)
GLUCOSE BLD-MCNC: 98 MG/DL (ref 70–99)
GLUCOSE BLD-MCNC: 98 MG/DL (ref 74–109)
GLUCOSE BLD-MCNC: 98 MG/DL (ref 74–109)
HBA1C MFR BLD: 6.2 % (ref 4–6)
HCT VFR BLD CALC: 22.7 % (ref 42–52)
HEMOGLOBIN: 7.3 G/DL (ref 14–18)
INR BLD: 2.43 (ref 0.88–1.18)
LYMPHOCYTES ABSOLUTE: 1 K/UL (ref 1.1–4.5)
LYMPHOCYTES RELATIVE PERCENT: 6.3 % (ref 20–40)
MAGNESIUM: 1.8 MG/DL (ref 1.6–2.4)
MAGNESIUM: 1.9 MG/DL (ref 1.6–2.4)
MCH RBC QN AUTO: 29.3 PG (ref 27–31)
MCHC RBC AUTO-ENTMCNC: 32.2 G/DL (ref 33–37)
MCV RBC AUTO: 91.2 FL (ref 80–94)
MONOCYTES ABSOLUTE: 0.7 K/UL (ref 0–0.9)
MONOCYTES RELATIVE PERCENT: 4.4 % (ref 0–10)
NEUTROPHILS ABSOLUTE: 13 K/UL (ref 1.5–7.5)
NEUTROPHILS RELATIVE PERCENT: 86.1 % (ref 50–65)
PDW BLD-RTO: 13.6 % (ref 11.5–14.5)
PERFORMED ON: ABNORMAL
PERFORMED ON: NORMAL
PHOSPHORUS: 2.2 MG/DL (ref 2.5–4.5)
PHOSPHORUS: 3.6 MG/DL (ref 2.5–4.5)
PHOSPHORUS: 3.9 MG/DL (ref 2.5–4.5)
PHOSPHORUS: 4.4 MG/DL (ref 2.5–4.5)
PHOSPHORUS: 4.4 MG/DL (ref 2.5–4.5)
PLATELET # BLD: 111 K/UL (ref 130–400)
PMV BLD AUTO: 11.5 FL (ref 9.4–12.4)
POTASSIUM REFLEX MAGNESIUM: 3.4 MMOL/L (ref 3.5–5)
POTASSIUM REFLEX MAGNESIUM: 3.8 MMOL/L (ref 3.5–5)
POTASSIUM REFLEX MAGNESIUM: 4 MMOL/L (ref 3.5–5)
POTASSIUM SERPL-SCNC: 3.3 MMOL/L (ref 3.5–5)
POTASSIUM SERPL-SCNC: 3.8 MMOL/L (ref 3.5–5)
POTASSIUM SERPL-SCNC: 3.8 MMOL/L (ref 3.5–5)
POTASSIUM SERPL-SCNC: 4 MMOL/L (ref 3.5–5)
POTASSIUM SERPL-SCNC: 4.1 MMOL/L (ref 3.5–5)
POTASSIUM SERPL-SCNC: 4.1 MMOL/L (ref 3.5–5)
PROTHROMBIN TIME: 26.5 SEC (ref 12–14.6)
RBC # BLD: 2.49 M/UL (ref 4.7–6.1)
SODIUM BLD-SCNC: 132 MMOL/L (ref 136–145)
SODIUM BLD-SCNC: 133 MMOL/L (ref 136–145)
SODIUM BLD-SCNC: 134 MMOL/L (ref 136–145)
SODIUM BLD-SCNC: 135 MMOL/L (ref 136–145)
SODIUM BLD-SCNC: 138 MMOL/L (ref 136–145)
SODIUM BLD-SCNC: 140 MMOL/L (ref 136–145)
SODIUM BLD-SCNC: 140 MMOL/L (ref 136–145)
TOTAL PROTEIN: 4.4 G/DL (ref 6.6–8.7)
TOTAL PROTEIN: 4.5 G/DL (ref 6.6–8.7)
TOTAL PROTEIN: 4.8 G/DL (ref 6.6–8.7)
TOTAL PROTEIN: 5.3 G/DL (ref 6.6–8.7)
TROPONIN: 2.35 NG/ML (ref 0–0.03)
TROPONIN: 2.71 NG/ML (ref 0–0.03)
TROPONIN: 2.88 NG/ML (ref 0–0.03)
TROPONIN: 3.24 NG/ML (ref 0–0.03)
WBC # BLD: 15.1 K/UL (ref 4.8–10.8)

## 2018-10-26 PROCEDURE — 2580000003 HC RX 258: Performed by: INTERNAL MEDICINE

## 2018-10-26 PROCEDURE — 99232 SBSQ HOSP IP/OBS MODERATE 35: CPT | Performed by: SURGERY

## 2018-10-26 PROCEDURE — 84484 ASSAY OF TROPONIN QUANT: CPT

## 2018-10-26 PROCEDURE — 84100 ASSAY OF PHOSPHORUS: CPT

## 2018-10-26 PROCEDURE — 85730 THROMBOPLASTIN TIME PARTIAL: CPT

## 2018-10-26 PROCEDURE — 8010000000 HC HEMODIALYSIS ACUTE INPT

## 2018-10-26 PROCEDURE — 2500000003 HC RX 250 WO HCPCS: Performed by: EMERGENCY MEDICINE

## 2018-10-26 PROCEDURE — 2700000000 HC OXYGEN THERAPY PER DAY

## 2018-10-26 PROCEDURE — 6360000002 HC RX W HCPCS: Performed by: FAMILY MEDICINE

## 2018-10-26 PROCEDURE — 85610 PROTHROMBIN TIME: CPT

## 2018-10-26 PROCEDURE — 2580000003 HC RX 258: Performed by: FAMILY MEDICINE

## 2018-10-26 PROCEDURE — 2100000000 HC CCU R&B

## 2018-10-26 PROCEDURE — 2500000003 HC RX 250 WO HCPCS: Performed by: INTERNAL MEDICINE

## 2018-10-26 PROCEDURE — 99231 SBSQ HOSP IP/OBS SF/LOW 25: CPT | Performed by: INTERNAL MEDICINE

## 2018-10-26 PROCEDURE — 6360000002 HC RX W HCPCS: Performed by: INTERNAL MEDICINE

## 2018-10-26 PROCEDURE — 36592 COLLECT BLOOD FROM PICC: CPT

## 2018-10-26 PROCEDURE — 2580000003 HC RX 258: Performed by: NURSE PRACTITIONER

## 2018-10-26 PROCEDURE — 5A1D70Z PERFORMANCE OF URINARY FILTRATION, INTERMITTENT, LESS THAN 6 HOURS PER DAY: ICD-10-PCS | Performed by: INTERNAL MEDICINE

## 2018-10-26 PROCEDURE — 2580000003 HC RX 258: Performed by: EMERGENCY MEDICINE

## 2018-10-26 PROCEDURE — 82948 REAGENT STRIP/BLOOD GLUCOSE: CPT

## 2018-10-26 PROCEDURE — C9113 INJ PANTOPRAZOLE SODIUM, VIA: HCPCS | Performed by: FAMILY MEDICINE

## 2018-10-26 PROCEDURE — 80053 COMPREHEN METABOLIC PANEL: CPT

## 2018-10-26 PROCEDURE — 83735 ASSAY OF MAGNESIUM: CPT

## 2018-10-26 PROCEDURE — 99232 SBSQ HOSP IP/OBS MODERATE 35: CPT | Performed by: INTERNAL MEDICINE

## 2018-10-26 PROCEDURE — P9016 RBC LEUKOCYTES REDUCED: HCPCS

## 2018-10-26 PROCEDURE — 94660 CPAP INITIATION&MGMT: CPT

## 2018-10-26 PROCEDURE — 36430 TRANSFUSION BLD/BLD COMPNT: CPT

## 2018-10-26 PROCEDURE — 83036 HEMOGLOBIN GLYCOSYLATED A1C: CPT

## 2018-10-26 PROCEDURE — 85025 COMPLETE CBC W/AUTO DIFF WBC: CPT

## 2018-10-26 PROCEDURE — 6370000000 HC RX 637 (ALT 250 FOR IP): Performed by: INTERNAL MEDICINE

## 2018-10-26 PROCEDURE — 2580000003 HC RX 258: Performed by: PHYSICIAN ASSISTANT

## 2018-10-26 RX ORDER — 0.9 % SODIUM CHLORIDE 0.9 %
250 INTRAVENOUS SOLUTION INTRAVENOUS ONCE
Status: COMPLETED | OUTPATIENT
Start: 2018-10-26 | End: 2018-10-26

## 2018-10-26 RX ADMIN — Medication 10 ML: at 09:13

## 2018-10-26 RX ADMIN — CEFEPIME 1 G: 1 INJECTION, POWDER, FOR SOLUTION INTRAMUSCULAR; INTRAVENOUS at 12:31

## 2018-10-26 RX ADMIN — LINEZOLID 600 MG: 600 INJECTION, SOLUTION INTRAVENOUS at 20:06

## 2018-10-26 RX ADMIN — Medication 10 ML: at 20:14

## 2018-10-26 RX ADMIN — METRONIDAZOLE 500 MG: 500 INJECTION, SOLUTION INTRAVENOUS at 20:03

## 2018-10-26 RX ADMIN — PANTOPRAZOLE SODIUM 40 MG: 40 INJECTION, POWDER, FOR SOLUTION INTRAVENOUS at 08:19

## 2018-10-26 RX ADMIN — SODIUM CHLORIDE 250 ML: 9 INJECTION, SOLUTION INTRAVENOUS at 15:25

## 2018-10-26 RX ADMIN — METRONIDAZOLE 500 MG: 500 INJECTION, SOLUTION INTRAVENOUS at 08:19

## 2018-10-26 RX ADMIN — LINEZOLID 600 MG: 600 INJECTION, SOLUTION INTRAVENOUS at 05:20

## 2018-10-26 RX ADMIN — METRONIDAZOLE 500 MG: 500 INJECTION, SOLUTION INTRAVENOUS at 23:36

## 2018-10-26 ASSESSMENT — ENCOUNTER SYMPTOMS
COUGH: 0
BLOOD IN STOOL: 0
BACK PAIN: 0
NAUSEA: 0
SORE THROAT: 0
VOMITING: 0
EYES NEGATIVE: 1
BACK PAIN: 1
EYE PAIN: 0
ROS SKIN COMMENTS: LEFT KNEE SURGICAL INCISION
ABDOMINAL PAIN: 0
EYE DISCHARGE: 0
CONSTIPATION: 0
WHEEZING: 0
SHORTNESS OF BREATH: 1
DIARRHEA: 0
EYE REDNESS: 0
GASTROINTESTINAL NEGATIVE: 1

## 2018-10-26 ASSESSMENT — PAIN SCALES - GENERAL
PAINLEVEL_OUTOF10: 0

## 2018-10-26 NOTE — PROGRESS NOTES
Visited with pt and wife, pt was awake and oriented. PA from Ortho came in during visit. Provided spiritual care with sustaining presence, nurtured hope, and prayer. Pt expressed gratitude for spiritual care.      Electronically signed by Lio Valente on 10/26/2018 at 1:41 PM

## 2018-10-26 NOTE — PROGRESS NOTES
Hospitalist Progress Note  10/26/2018 1:07 PM  Subjective:   Admit Date: 10/23/2018  PCP: Austin Elliott MD    Chief Complaint: hematuria, fatigue    Subjective: Patient now on levophed. More disoriented and somnolent this AM.  However, still able to state location and year. GI suggesting percutaneous drainage for ?cholecystitis as source of sepsis. Cumulative Hospital History:   10/23: Admitted to ICU for septic shock, multiorgan failure. Started on pressors and broad spectrum abx. 10/24: Broaden antibiotics, continue with current care. 10/25: Patient now receiving HD. LFTs appear to be trending down. Continue broad spectrum abx and wean levophed as tolerated. Will discuss with surgery about percutaneous drainage for ?cholecystitis. 10/26: Patient restarted on insulin gtt overnight, will change back to SSI as patient has had minimal requirements throughout the day. Continue to monitor and adjust as needed. ID consulted yesterday, appreciate recs. ROS: 14 point review of systems is negative except as specifically addressed above.     Diet NPO Effective Now    Intake/Output Summary (Last 24 hours) at 10/26/18 1307  Last data filed at 10/26/18 1209   Gross per 24 hour   Intake          1464.49 ml   Output              795 ml   Net           669.49 ml     Medications:   dextrose      dextrose      dextrose 5 % and 0.45 % NaCl      norepinephrine Stopped (10/26/18 1209)    DOPamine Stopped (10/24/18 1004)    vasopressin infusion Stopped (10/24/18 0242)     Current Facility-Administered Medications   Medication Dose Route Frequency Provider Last Rate Last Dose    0.9 % sodium chloride infusion 250 mL  250 mL Intravenous Once ELISABETH Tolbert        insulin lispro (HUMALOG) injection vial 0-6 Units  0-6 Units Subcutaneous TID  Tasia Leon MD        insulin lispro (HUMALOG) injection vial 0-3 Units  0-3 Units Subcutaneous Nightly Tasia Leon MD        cefepime (MAXIPIME) 1 g in in dextrose 5 % 250 mL infusion  5 mcg/kg/min Intravenous Continuous Odalis Delacruz MD   Stopped at 10/24/18 1004    pantoprazole (PROTONIX) injection 40 mg  40 mg Intravenous Daily [facility], DO   40 mg at 10/26/18 1593    And    sodium chloride (PF) 0.9 % injection 10 mL  10 mL Intravenous Daily [facility], DO   10 mL at 10/26/18 0913    vasopressin 20 Units in dextrose 5 % 100 mL infusion  0.03 Units/min Intravenous Continuous Ray Kathy, DO   Stopped at 10/24/18 0242    sodium polystyrene (KAYEXALATE) 15 GM/60ML suspension 15 g  15 g Rectal Once Ray Kathy, DO            Labs:     Recent Labs      10/24/18   1600  10/25/18   0320  10/26/18   0300   WBC  27.1*  21.3*  15.1*   RBC  2.85*  2.74*  2.49*   HGB  8.5*  8.0*  7.3*   HCT  25.5*  24.7*  22.7*   MCV  89.5  90.1  91.2   MCH  29.8  29.2  29.3   MCHC  33.3  32.4*  32.2*   PLT  170  144  111*     Recent Labs      10/25/18   2251  10/26/18   0300  10/26/18   0815   NA  129*  132*  134*  133*   K  3.7  3.8  3.8  4.1  4.1   ANIONGAP  13  14  14  13   CL  90*  93*  93*  93*   CO2  26  25  27  27   BUN  38*  41*  45*  46*   CREATININE  3.9*  4.4*  4.6*  4.6*   GLUCOSE  239*  98  152*  151*   CALCIUM  7.3*  7.5*  7.7*  7.6*     Recent Labs      10/25/18   2251  10/26/18   0300  10/26/18   0815   MG  1.8  1.9  1.9   PHOS  3.6  3.9  4.4     Recent Labs      10/25/18   1700  10/26/18   0300  10/26/18   0815   AST  3,039*  2,328*  1,970*   ALT  2,789*  2,981*  2,731*   BILITOT  5.7*  6.1*  6.2*   ALKPHOS  172*  178*  176*     ABGs:  Recent Labs      10/23/18   1317  10/24/18   0446  10/24/18   1618  10/25/18   0440   PHART  7.140*  7.230*  7.520*  7.430   FJH3UHU  34.0*  33.0*  36.0  46.0*   PO2ART  149.0*  108.0*  78.0*  85.0   SEU1OTV  11.6*  13.8*  29.4*  30.5*   BEART  -16.2*  -12.7*  6.2*  5.6*   HGBAE  8.3*  7.9*  9.1*  8.4*   S2HVRIXA  97.2  96.1  95.1  95.1   CARBOXHGBART  2.2  2.4  2.3  2.6   02THERAPY  Unknown  Unknown  Unknown  Unknown     HgBA1c: Invalid input(s): HGBA1C  FLP:    Lab Results   Component Value Date    TRIG 176 10/01/2018    HDL 38 10/01/2018    LDLCALC 72 10/01/2018     TSH:  No results found for: TSH  Troponin T:   Recent Labs      10/25/18   1700  10/26/18   0300  10/26/18   0815   TROPONINI  2.53*  2.88*  2.35*     INR:   Recent Labs      10/24/18   0511  10/25/18   0320  10/26/18   0328   INR  6.77*  3.12*  2.43*       Objective:   Vitals: BP (!) 96/49   Pulse 63   Temp 98.4 °F (36.9 °C) (Temporal)   Resp 17   Ht 6' (1.829 m)   Wt 289 lb 14.4 oz (131.5 kg)   SpO2 99%   BMI 39.32 kg/m²   24HR INTAKE/OUTPUT:      Intake/Output Summary (Last 24 hours) at 10/26/18 1307  Last data filed at 10/26/18 1209   Gross per 24 hour   Intake          1464.49 ml   Output              795 ml   Net           669.49 ml     General appearance: alert and cooperative with exam  HEENT: scleral icterus   Neck without masses, lympadenopathy, bruit, thyroid normal  Lungs: no increased work of breathing, diminished breath sounds bilaterally, rales bibasilar and    Heart: regular rate and rhythm and S1, S2 normal  Abdomen: soft, non-tender; bowel sounds normal; no masses,  no organomegaly  Extremities: extremities normal, atraumatic, no cyanosis or edema  Neurologic: No focal neurologic deficits, normal sensation, alert and oriented, affect and mood appropriate. Skin: jaundice. no rashes, nodules. Assessment and Plan:   Principal Problem:    Septic shock ([address]  Active Problems:    Sepsis ([address]    Acute liver failure without hepatic coma    Acute kidney injury ([address]    CHF (congestive heart failure) (HCC)    Gross hematuria    Bilateral pleural effusion    Acute respiratory failure with hypoxia (HCC)    Elevated troponin    Coagulopathy (HCC)    Hyperkalemia    Hypochloremia    Metabolic acidosis    Cardiogenic shock ([address]    Palliative care patient    Shock liver  Resolved Problems:    * No resolved hospital problems. *    Continue abx coverage per ID.

## 2018-10-26 NOTE — PROGRESS NOTES
Patient is alert and more oriented. Still needs to be reminded of abilities given current CCU status.  Electronically signed by Guru David RN on 10/26/2018 at 11:14 AM

## 2018-10-26 NOTE — PROGRESS NOTES
INFECTIOUS DISEASES PROGRESS NOTE    Patient:  Arnaldo Bush  YOB: 1940  MRN: [id number]   Admit date: 10/23/2018   Admitting Physician: Larry Francisco MD  Primary Care Physician: Jose Eduardo Laws MD    CHIEF COMPLAINT: Shock      Interval History:  Patient is getting towards the end of the hemodialysis which he is receiving at the bedside in CCU. He was weaned off of norepinephrine today. He is much more alert today and thinks he may remember seeing me yesterday. He states he is not really hurting anywhere except for his back and that is because he is lying flat. He reports it is not new problem. He has questions about where a think his infection may have come from contact he had one. He also is asking a question about continuous hemodialysis which I did not understand. The dialysis nurse was beside me as well and we both tried explaining to the patient how dialysis worked. Allergies: Allergies   Allergen Reactions    Promethazine Hcl Other (See Comments)     He became encephalopathic for several hours and was not responsive.        Current Meds:   insulin lispro (HUMALOG) injection vial 0-6 Units TID WC   insulin lispro (HUMALOG) injection vial 0-3 Units Nightly   cefepime (MAXIPIME) 1 g in sodium chloride (PF) 10 mL IV syringe Q24H   glucose (GLUTOSE) 40 % oral gel 15 g PRN   dextrose 50 % solution 12.5 g PRN   glucagon (rDNA) injection 1 mg PRN   dextrose 5 % solution PRN   linezolid (ZYVOX) IVPB 600 mg Q12H   glucose (GLUTOSE) 40 % oral gel 15 g PRN   dextrose 50 % solution 12.5 g PRN   glucagon (rDNA) injection 1 mg PRN   dextrose 5 % solution PRN   dextrose 50 % solution 12.5 g PRN   magnesium sulfate 1 g in dextrose 5% 100 mL IVPB PRN   dextrose 5 % and 0.45 % sodium chloride infusion Continuous PRN   0.9 % sodium chloride bolus Once   lidocaine PF 1 % injection 5 mL Once   sodium chloride flush 0.9 % injection 10 mL 2 times per day   sodium chloride flush 0.9 % 10/25/18 1403     Narrative:       XR CHEST PORTABLE 10/25/2018 12:30 PM  HISTORY: PIC catheter placement  COMPARISON: October 24, 2018. FINDINGS:   The left lung is clear. There is a persistent opacity in the right  lung base. This may be residual pulmonary edema, possibly atelectasis  or an inflammatory process. . Cardiac silhouette is mildly enlarged. A  right PIC catheter is present with the distal tip satisfactorily  positioned in the superior vena cava. .   The osseous structures and surrounding soft tissues demonstrate no  acute abnormality.     Impression:       1. Persistent opacity in the right lung base. This may be residual  pulmonary edema, possibly atelectasis or an inflammatory process this  is unchanged from October 24.     Signed by Dr Tamara Hand on 10/25/2018 2:01 PM     XR CHEST PORTABLE [[id number]] Resulted: 10/24/18 0814     Order Status: Completed Updated: 10/24/18 0816     Narrative:       EXAMINATION: XR CHEST PORTABLE 10/24/2018 8:12 AM  HISTORY: Pleural effusion, possible pneumonia  COMPARISON: 10/23/2018  FINDINGS:  The heart and mediastinal contours are stable. The aorta is tortuous  with atherosclerotic calcifications. The pulmonary vasculature remains  somewhat indistinct, though there has been improvement in the  bilateral perihilar opacities. Airspace opacities persist at the lung  bases, possibly atelectasis. There is no appreciable pneumothorax.     Impression:       Improving pulmonary edema and pulmonary vascular  congestion. Bibasilar atelectasis. Signed by Dr Alcon King on 10/24/2018 8:14 AM     US RENAL COMPLETE [[id number]] Resulted: 10/23/18 1757     Order Status: Completed Updated: 10/23/18 1759     Narrative:       EXAMINATION: US RENAL COMPLETE 10/23/2018 5:53 PM  HISTORY: Acute renal failure. Gross hematuria.   Report: Sonographic images of the kidneys were obtained, comparison is  made with the previous ultrasound 10/17/2018.   The right kidney measures 12.0 x 4.9 x retroperitoneal para-aortic lymphadenopathy. There is a small amount of ascites particularly in the right upper  abdomen in the paracolic gutter. Severe chronic degenerative changes of the lumbar spine are seen with  a mild levoscoliosis.     Impression:       A small amount of abdominal ascites. Moderate thickening of the wall of the gallbladder with a fluid in the  gallbladder fossa may represent acute cholecystitis. No gallstones are  noted. This may be clinically correlated and further evaluated. The urinary bladder is decompressed. There is an apparent soft tissue  density located posteriorly at the floor of the urinary bladder which  may be extrinsic pressure from the enlarged prostate. This may be  clinically correlated. A small bibasilar pleural effusion, more on the right side. The severe atheromatous changes of the abdominal aorta and iliac  arteries. The above study was initially reviewed and reported by [facility]. I do  not find any discrepancies.            Patient Active Problem List   Diagnosis    Diverticulitis of large intestine with perforation without bleeding    Generalized abdominal pain    Colonic diverticular abscess    Bilateral carotid artery stenosis    Atherosclerosis of native artery of both lower extremities with intermittent claudication ([address]    Carotid stenosis, asymptomatic, left    Primary osteoarthritis of left knee    Arthritis of knee    Essential hypertension    Pure hypercholesterolemia    Slow transit constipation    Iron deficiency anemia    CKD (chronic kidney disease), stage II    GERD (gastroesophageal reflux disease)    Septic shock (HCC)    Sepsis ([address]    Acute liver failure without hepatic coma    Acute kidney injury ([address]    CHF (congestive heart failure) (HCC)    Gross hematuria    Bilateral pleural effusion    Acute respiratory failure with hypoxia (HCC)    Elevated troponin    Coagulopathy (HCC)    Hyperkalemia    Hypochloremia   

## 2018-10-26 NOTE — CARE COORDINATION
Chart review d/t readmission: pt is s/p TKR on 10/15- patient was doing okay with out any c/o until a few hours before coming to the ER--in cardiogenic shock atrial fib then bradycardia--has gone into multi-organ failure requiring hemodialysis. May have some gallbladder issues but general surgery does not believe this to be the cause of his current illness and his TKR looks to be without infection at this time. So this re-admission does not appear to be related to the previous one.     .Electronically signed by Leodan Quinones RN on 10/26/2018 at 3:47 PM

## 2018-10-26 NOTE — PROGRESS NOTES
place, and time. He appears well-developed and well-nourished. He appears ill. No distress. HENT:   Head: Normocephalic and atraumatic. Mouth/Throat: Oropharynx is clear and moist.   Eyes: Conjunctivae are normal. Right eye exhibits no discharge. Left eye exhibits no discharge. No scleral icterus. Neck: Normal range of motion. Neck supple. No JVD present. No tracheal deviation present. Cardiovascular: Normal rate, regular rhythm, normal heart sounds and intact distal pulses. Exam reveals no gallop and no friction rub. No murmur heard. Pulmonary/Chest: Accessory muscle usage present. No respiratory distress. He has decreased breath sounds in the right lower field and the left lower field. He has no wheezes. He has no rales. He exhibits no tenderness. Abdominal: Soft. Bowel sounds are normal. He exhibits no distension and no mass. There is no tenderness. There is no rebound and no guarding. No hernia. Musculoskeletal: He exhibits no edema, tenderness or deformity. Lymphadenopathy:     He has no cervical adenopathy. Neurological: He is alert and oriented to person, place, and time. No cranial nerve deficit. Coordination normal.   Skin: Skin is warm. No rash noted. He is not diaphoretic. No erythema. No pallor. Left knee surgical incision   Psychiatric: He has a normal mood and affect. His behavior is normal. Thought content normal.   Nursing note and vitals reviewed.       BMP:   Recent Labs      10/25/18   2251  10/26/18   0300   NA  129*  132*   K  3.7  3.8  3.8   CL  90*  93*   CO2  26  25   BUN  38*  41*   CREATININE  3.9*  4.4*   GLUCOSE  239*  98   CALCIUM  7.3*  7.5*     CBC:   Recent Labs      10/25/18   0320  10/26/18   0300   WBC  21.3*  15.1*   HGB  8.0*  7.3*   HCT  24.7*  22.7*   MCV  90.1  91.2   PLT  144  111*     CMP:    Recent Labs      10/25/18   1700   10/25/18   2251  10/26/18   0300   NA  123*  121*   --   129*  132*   K  3.8  3.9   --   3.7  3.8  3.8   CL  84*  82*   -- 90*  93*   CO2  25  26   --   26  25   BUN  32*  32*   --   38*  41*   CREATININE  3.4*  3.3*   --   3.9*  4.4*   LABGLOM  18*  18*   --   15*  13*   GLUCOSE  598*  654*   < >  239*  98   PROT  4.4*   --    --   4.8*   LABALBU  2.6*   --    --   2.8*   CALCIUM  6.8*  6.6*   --   7.3*  7.5*   BILITOT  5.7*   --    --   6.1*   ALKPHOS  172*   --    --   178*   AST  3,039*   --    --   2,328*   ALT  2,789*   --    --   2,981*    < > = values in this interval not displayed. 30Day lookback of cultures:    Blood Culture Recent:   Recent Labs      10/23/18   1015   BC  No Growth to date. Any change in status will be called. Gram Stain Recent: No results for input(s): LABGRAM in the last 720 hours. Resp Culture Recent: No results for input(s): CULTRESP in the last 720 hours. Body Fluid Recent : No results for input(s): BFCX in the last 720 hours. MRSA Recent :   Recent Labs      10/01/18   0945   MRSAC  No MRSA detected on culture      Urine Culture Recent : No results for input(s): LABURIN in the last 720 hours. Organism Recent : No results for input(s): ORG in the last 720 hours. Radiology:   Xr Knee Left (1-2 Views)    Result Date: 10/15/2018  Examination. XR KNEE LEFT (1-2 VIEWS) History: Postop arthroplasty. The portable frontal and crosstable laterally of the left knee are obtained. There is no previous study for comparison. There is evidence of a knee arthroplasty. There is normal alignment of the hardware components. No complication. There is soft tissue air around the knee. No acute bony abnormality. A superficial femoral and popliteal artery graft is seen in place. Severe atheromatous changes of the distal popliteal and tibial arteries are seen. A normal left knee arthroplasty. Signed by Dr Doreen Oscar on 10/15/2018 1:19 PM    Us Renal Complete    Result Date: 10/23/2018  EXAMINATION: US RENAL COMPLETE 10/23/2018 5:53 PM HISTORY: Acute renal failure. Gross hematuria.  Report: atheromatous changes of the abdominal aorta and iliac arteries. No aneurysmal dilatation or dissection. There is a mild retroperitoneal para-aortic lymphadenopathy. There is a small amount of ascites particularly in the right upper abdomen in the paracolic gutter. Severe chronic degenerative changes of the lumbar spine are seen with a mild levoscoliosis. A small amount of abdominal ascites. Moderate thickening of the wall of the gallbladder with a fluid in the gallbladder fossa may represent acute cholecystitis. No gallstones are noted. This may be clinically correlated and further evaluated. The urinary bladder is decompressed. There is an apparent soft tissue density located posteriorly at the floor of the urinary bladder which may be extrinsic pressure from the enlarged prostate. This may be clinically correlated. A small bibasilar pleural effusion, more on the right side. The severe atheromatous changes of the abdominal aorta and iliac arteries. The above study was initially reviewed and reported by [facility]. I do not find any discrepancies. Signed by Dr Boni Jackson on 10/23/2018 7:38 AM    Xr Chest Portable    Result Date: 10/24/2018  EXAMINATION: XR CHEST PORTABLE 10/24/2018 8:12 AM HISTORY: Pleural effusion, possible pneumonia COMPARISON: 10/23/2018 FINDINGS: The heart and mediastinal contours are stable. The aorta is tortuous with atherosclerotic calcifications. The pulmonary vasculature remains somewhat indistinct, though there has been improvement in the bilateral perihilar opacities. Airspace opacities persist at the lung bases, possibly atelectasis. There is no appreciable pneumothorax. Improving pulmonary edema and pulmonary vascular congestion. Bibasilar atelectasis. Signed by Dr Cayden Melo on 10/24/2018 8:14 AM    Xr Chest Portable    Result Date: 10/23/2018  Examination. XR CHEST PORTABLE History: CVC insertion.  A frontal portable upright view of the chest is compared with the previous study obtained earlier today. No interval change. There is persistent pulmonary vascular congestion and pulmonary edema. There are atelectatic changes in the right lower lung. There is moderate cardiomegaly. Atheromatous changes of thoracic aorta are noted. No pneumothorax. Postsurgical changes of the right shoulder are noted. No change. Persistent pulmonary vascular congestion and pulmonary edema. Discoid atelectasis right lower lung. No pneumothorax. The above finding are recorded on a digital voice clip in PACS. Signed by Dr Luiz Kohli on 10/23/2018 7:45 AM    Xr Chest Portable    Result Date: 10/23/2018  EXAMINATION: XR CHEST PORTABLE 10/23/2018 7:17 AM HISTORY: Irregular heart rate COMPARISON: 8/20/2018 FINDINGS: The heart is magnified but felt to be normal in size. The aorta is tortuous with atherosclerotic calcifications. The pulmonary vasculature is indistinct, and there are subtle perihilar opacities bilaterally. Opacities extend into the right lung base. A layering right pleural effusion is suspected. There is no appreciable pneumothorax. Suspect pulmonary vascular congestion with right greater than left basilar atelectasis. Layering right pleural effusion suspected. Signed by Dr Abhijit Archibald on 10/23/2018 7:19 AM    Cta Pulmonary W Contrast    Result Date: 10/23/2018  CTA PULMONARY W CONTRAST 10/23/2018 4:00 AM HISTORY: COMPARISON: None. DLP: 863 mGy cm TECHNIQUE: Helical tomographic images of the chest were obtained after the administration of intravenous contrast following angiogram protocol. Additionally, 3D MIP reconstructions in the coronal and sagittal planes were provided. FINDINGS:  Pulmonary arteries: There is adequate enhancement of the pulmonary arteries to evaluate for central and segmental pulmonary emboli. There are no filling defects within the main, lobar, segmental or visualized subsegmental pulmonary arteries. . Aorta and great vessels: There is atherosclerosis in the aorta. There is atherosclerosis in the great vessels without evidence of stenosis. Coronary artery calcifications are visualized. Neck base: The imaged portion of the base of the neck appears unremarkable. Lungs: The lungs are clear. Bilateral pleural effusions are present slightly greater on the right than the left. Bibasilar atelectasis is also noted. The trachea and bronchial tree are patent. Heart: The heart is normal in size. There is no pericardial effusion. Lymph nodes: No pathologically enlarged mediastinal, hilar, or axillary lymph nodes are present. Bones and soft tissues: The osseous structures of the thorax and surrounding soft tissues demonstrate no acute process. Upper abdomen: The imaged portion of the upper abdomen demonstrates no acute process. 1. No evidence of embolic disease. 2. Bilateral pleural effusions are noted. 3. Bibasilar dependent atelectasis.  Signed by Dr Carlos Mejia on 10/23/2018 7:39 AM      Medications  Current Facility-Administered Medications   Medication Dose Route Frequency Provider Last Rate Last Dose    cefepime (MAXIPIME) 1 g in sodium chloride (PF) 10 mL IV syringe  1 g Intravenous Q24H Thang Orantes, DO   1 g at 10/25/18 1139    glucose (GLUTOSE) 40 % oral gel 15 g  15 g Oral PRN Leander Jensen MD        dextrose 50 % solution 12.5 g  12.5 g Intravenous PRN Leander Jensen MD        glucagon (rDNA) injection 1 mg  1 mg Intramuscular PRN Leander Jensen MD        dextrose 5 % solution  100 mL/hr Intravenous PRN Leander Jensen MD        linezolid (ZYVOX) IVPB 600 mg  600 mg Intravenous Q12H Franchesca Meehan MD   Stopped at 10/26/18 0656    insulin regular (HUMULIN R;NOVOLIN R) 100 Units in sodium chloride 0.9 % 100 mL infusion  0.5 Units/hr Intravenous Continuous Leander Jensen MD 0.4 mL/hr at 10/26/18 0518 0.44 Units/hr at 10/26/18 0518    glucose (GLUTOSE) 40 % oral gel 15 g  15 g Oral PRN Leander Jensen MD        dextrose 50 % solution 12.5 g 12.5 g Intravenous PRN Papo Davis MD        glucagon (rDNA) injection 1 mg  1 mg Intramuscular PRN Ppao Davis MD        dextrose 5 % solution  100 mL/hr Intravenous PRN Papo Davis MD        dextrose 50 % solution 12.5 g  12.5 g Intravenous PRN Ally Nowak MD        magnesium sulfate 1 g in dextrose 5% 100 mL IVPB  1 g Intravenous PRN Zaire Richardson MD        dextrose 5 % and 0.45 % sodium chloride infusion   Intravenous Continuous PRN Ally Nowak MD        0.9 % sodium chloride bolus  500 mL Intravenous Once Ally Nowak MD   Stopped at 10/24/18 0624    lidocaine PF 1 % injection 5 mL  5 mL Intradermal Once Lan Kruger PA-C        sodium chloride flush 0.9 % injection 10 mL  10 mL Intravenous 2 times per day Lan YO KrugerC   10 mL at 10/25/18 1930    sodium chloride flush 0.9 % injection 10 mL  10 mL Intravenous PRN Lan Kruger PA-C        metronidazole (FLAGYL) 500 mg in NaCl 100 mL IVPB premix  500 mg Intravenous Q8H Papo Davis MD   Stopped at 10/26/18 0059    phytonadione (VITAMIN K) tablet 5 mg  5 mg Oral Once ELISABETH Tolbert        norepinephrine (LEVOPHED) 16 mg in dextrose 5 % 250 mL infusion  5 mcg/min Intravenous Continuous Jaye Amaro MD 9.4 mL/hr at 10/26/18 0209 10 mcg/min at 10/26/18 0209    DOPamine (INTROPIN) 400 mg in dextrose 5 % 250 mL infusion  5 mcg/kg/min Intravenous Continuous Jaye Amaro MD   Stopped at 10/24/18 1004    pantoprazole (PROTONIX) injection 40 mg  40 mg Intravenous Daily Thang Orantes, DO   40 mg at 10/25/18 1140    And    sodium chloride (PF) 0.9 % injection 10 mL  10 mL Intravenous Daily Thang Orantes, DO   10 mL at 10/25/18 1146    vasopressin 20 Units in dextrose 5 % 100 mL infusion  0.03 Units/min Intravenous Continuous Thang Orantes, DO   Stopped at 10/24/18 0242    sodium polystyrene (KAYEXALATE) 15 GM/60ML suspension 15 g  15 g Rectal Once Jerel Roann Orantes, DO           Allergies  Promethazine hcl

## 2018-10-26 NOTE — CARE COORDINATION
[address] Transitions Interview     10/26/2018    Patient: Tejas Tapia Patient : 1940   MRN: [id number]  Reason for Admission: There are no discharge diagnoses documented for the most recent discharge. RARS: Readmission Risk Score: 29       Spoke with: Tejas Tapia and wife      Readmission Risk  Patient Active Problem List   Diagnosis    Diverticulitis of large intestine with perforation without bleeding    Generalized abdominal pain    Colonic diverticular abscess    Bilateral carotid artery stenosis    Atherosclerosis of native artery of both lower extremities with intermittent claudication (HCC)    Carotid stenosis, asymptomatic, left    Primary osteoarthritis of left knee    Arthritis of knee    Essential hypertension    Pure hypercholesterolemia    Slow transit constipation    Iron deficiency anemia    CKD (chronic kidney disease), stage II    GERD (gastroesophageal reflux disease)    Septic shock (HCC)    Sepsis (HCC)    Acute liver failure without hepatic coma    Acute kidney injury ([address]    CHF (congestive heart failure) (HCC)    Gross hematuria    Bilateral pleural effusion    Acute respiratory failure with hypoxia (HCC)    Elevated troponin    Coagulopathy (HCC)    Hyperkalemia    Hypochloremia    Metabolic acidosis    Cardiogenic shock (HCC)    Palliative care patient    Shock liver       Inpatient Assessment  Care Transitions Summary    Care Transitions Inpatient Review  Medication Review  Are you able to afford your medications?:  Yes  How often do you have difficulty taking your medications?:  I always take them as prescribed.   Housing Review  Who do you live with?:  Partner/Spouse/SO  Are you an active caregiver in your home?:  No  Social Support  Do you have a ?:  No  Do you have a [address] [place] [address]?:  Yes  Durable Medical Equipment  Patient DME:  Jes , Quin cane  Functional Review  Ability to seek help/take

## 2018-10-26 NOTE — PROGRESS NOTES
Re-assessment completed, see flow sheets. Patient is resting in bed without any s/s of distress. CBI continues to run without any difficulty. Continues on levophed and insulin drip.  Electronically signed by Gilda Dee RN on 10/26/2018 at 12:53 AM

## 2018-10-26 NOTE — PROGRESS NOTES
Jorge Levin MD 4.7 mL/hr at 10/26/18 0743 5 mcg/min at 10/26/18 0743    DOPamine (INTROPIN) 400 mg in dextrose 5 % 250 mL infusion  5 mcg/kg/min Intravenous Continuous Jorge Levin MD   Stopped at 10/24/18 1004    pantoprazole (PROTONIX) injection 40 mg  40 mg Intravenous Daily [place] Short, DO   40 mg at 10/26/18 1982    And    sodium chloride (PF) 0.9 % injection 10 mL  10 mL Intravenous Daily Thangwoody Orantes, DO   10 mL at 10/26/18 0913    vasopressin 20 Units in dextrose 5 % 100 mL infusion  0.03 Units/min Intravenous Continuous [place] Short, DO   Stopped at 10/24/18 0242    sodium polystyrene (KAYEXALATE) 15 GM/60ML suspension 15 g  15 g Rectal Once Faustino Short, DO           Past Medical History:  Past Medical History:   Diagnosis Date    Acute liver failure without hepatic coma 10/23/2018    Back pain     \"with tired legs as a result\"    Blood circulation, collateral     Carotid arterial disease ([facility]     recent surgery    CHF (congestive heart failure) ([facility] [facility] 10/23/2018    CKD (chronic kidney disease), 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/2018    GERD (gastroesophageal reflux disease)     Hyperlipidemia     Hypertension     Hypertension     Palliative care patient 10/23/2018    Primary osteoarthritis of left knee 10/14/2018    PVD (peripheral vascular disease) [facility])        Past Surgical History:  Past Surgical History:   Procedure Laterality Date    BACK SURGERY      COLONOSCOPY  2007?     [place] COLONOSCOPY FLX DX W/COLLJ SPEC WHEN PFRMD N/A 9/11/2017    Dr Tasia Bell internal hemorrhoids, diverticular disease-HP-No recall (age)   [facility] [place] REVISE MEDIAN N/CARPAL TUNNEL SURG Left 7/18/2018    OPEN CARPAL TUNNEL RELEASE performed by Mitesh Truong MD at [address] Left 8/28/2018    LEFT CAROTID ENDARTERECTOMY WITH VEIN PATCH ANGIOPLASTY AND COMPLETION ANGIOGRAM performed by Ernestine Bull MD at [address] Left 10/15/2018    LEFT COMPLEX TOTAL KNEE ARTHROPLASTY performed by Lisa Torres MD at [facility] VASCULAR SURGERY  04/21/2015    Edison BESS Ultrasound guided access of left common femoral artery. Aortogram.Diagnostic right lower extremity arteriogram.Radiologic supervision and interpretation.  VASCULAR SURGERY  01/13/2015    Edison Manjarrez M.D Atherectomy,angioplasty,and stenting of left superficial femoral artery.  VASCULAR SURGERY  03/11/2014    Edison Manjarrez M.D. Ultrasound-guided access of right common femoral artery. Aortogram.Left lower extremity arteriogram.Atherectomy and angioplasty of left superficial femoral artery. Radiologic supervision and interpretation.  VASCULAR SURGERY  01/18/2013    Edison BESS Aortogram.Multistation arteriogram right lower extremity. Laser atherectomy and angioplasty of right superficial femoral artery. Selective catheterization of right tibioperoneal trunk. Angioplasty of peroneal artery and tibioperoneal trunk. Family History  Family History   Problem Relation Age of Onset    Colon Cancer Father     Diabetes Brother     Colon Polyps Neg Hx     Liver Cancer Neg Hx     Liver Disease Neg Hx     Esophageal Cancer Neg Hx     Rectal Cancer Neg Hx     Stomach Cancer Neg Hx        Social History  Social History     Social History    Marital status:      Spouse name: N/A    Number of children: N/A    Years of education: N/A     Occupational History    Not on file.      Social History Main Topics    Smoking status: Former Smoker     Quit date: 6/3/2003    Smokeless tobacco: Never Used    Alcohol use 7.2 oz/week     12 Glasses of wine per week      Comment: 2 glasses of wine every night    Drug use: No    Sexual activity: Yes     Partners: Female     Other Topics Concern    Not on file     Social History Narrative    No narrative on file         Review of Systems:  History obtained from chart review and the patient  General ROS: No fever or COMPLETE 10/23/2018 5:53 PM HISTORY: Acute renal failure. Gross hematuria. Report: Sonographic images of the kidneys were obtained, comparison is made with the previous ultrasound 10/17/2018. The right kidney measures 12.0 x 4.9 x 4.7 cm, at the inferior pole, there is a benign-appearing cyst that measures 2.8 x 2.2 x 2.0 cm. This appears stable. Also at the inferior pole on the right there is a small hypoechoic nodule, probable cyst that measures 9 mm, projecting from the inferior pole cortex. This is not clearly seen on the previous ultrasound. Color flow images demonstrate vascular flow within the right kidney. Renal cortical echogenicity is within normal limits. There is no hydronephrosis on the right. The left kidney measures 10.0 x 5.4 x 4.4 cm and has normal cortical echogenicity, mild cortical thinning is present. No mass or hydronephrosis is identified. Color flow imaging demonstrates vascular flow within the left kidney. The bladder is decompressed by a Shultz catheter. 1. Mild atrophy of the left kidney with mild cortical thinning. No renal mass or hydronephrosis. 2. 2 cysts are identified at the inferior pole the right kidney, the largest measures 2.8 cm. This appears stable and benign. 3. The bladder is decompressed by a Shultz catheter. Signed by Dr Rolanda Barrera on 10/23/2018 5:57 PM    Ct Abdomen Pelvis W Iv Contrast Additional Contrast? None    Result Date: 10/23/2018  Examination. CT ABDOMEN PELVIS W IV CONTRAST History: Abnormal liver function tests. DLP: 164 8mGycm. The CT scan of the abdomen and pelvis is performed without oral or intravenous contrast enhancement. The images are acquired in axial plane with subsequent reconstruction in coronal and sagittal planes. The comparison is made with the previous study dated 8/7/2017. There are extensive artifacts which limit the diagnostic yield of the study.  The lung bases included in the study show small bibasilar pleural effusion, more on the

## 2018-10-27 LAB
ALBUMIN SERPL-MCNC: 2.6 G/DL (ref 3.5–5.2)
ALBUMIN SERPL-MCNC: 2.7 G/DL (ref 3.5–5.2)
ALP BLD-CCNC: 164 U/L (ref 40–130)
ALP BLD-CCNC: 173 U/L (ref 40–130)
ALT SERPL-CCNC: 1703 U/L (ref 5–41)
ALT SERPL-CCNC: 1840 U/L (ref 5–41)
AMMONIA: 25 UMOL/L (ref 16–60)
ANION GAP SERPL CALCULATED.3IONS-SCNC: 12 MMOL/L (ref 7–19)
ANION GAP SERPL CALCULATED.3IONS-SCNC: 13 MMOL/L (ref 7–19)
ANION GAP SERPL CALCULATED.3IONS-SCNC: 13 MMOL/L (ref 7–19)
ANION GAP SERPL CALCULATED.3IONS-SCNC: 14 MMOL/L (ref 7–19)
ANION GAP SERPL CALCULATED.3IONS-SCNC: 15 MMOL/L (ref 7–19)
APTT: 31.1 SEC (ref 26–36.2)
AST SERPL-CCNC: 721 U/L (ref 5–40)
AST SERPL-CCNC: 861 U/L (ref 5–40)
BASOPHILS ABSOLUTE: 0 K/UL (ref 0–0.2)
BASOPHILS RELATIVE PERCENT: 0.1 % (ref 0–1)
BILIRUB SERPL-MCNC: 7.8 MG/DL (ref 0.2–1.2)
BILIRUB SERPL-MCNC: 8.8 MG/DL (ref 0.2–1.2)
BLOOD BANK DISPENSE STATUS: NORMAL
BLOOD BANK PRODUCT CODE: NORMAL
BPU ID: NORMAL
BUN BLDV-MCNC: 36 MG/DL (ref 8–23)
BUN BLDV-MCNC: 40 MG/DL (ref 8–23)
BUN BLDV-MCNC: 44 MG/DL (ref 8–23)
BUN BLDV-MCNC: 46 MG/DL (ref 8–23)
BUN BLDV-MCNC: 50 MG/DL (ref 8–23)
CALCIUM SERPL-MCNC: 7.9 MG/DL (ref 8.8–10.2)
CALCIUM SERPL-MCNC: 8 MG/DL (ref 8.8–10.2)
CALCIUM SERPL-MCNC: 8.2 MG/DL (ref 8.8–10.2)
CHLORIDE BLD-SCNC: 94 MMOL/L (ref 98–111)
CHLORIDE BLD-SCNC: 96 MMOL/L (ref 98–111)
CHLORIDE BLD-SCNC: 96 MMOL/L (ref 98–111)
CHLORIDE BLD-SCNC: 97 MMOL/L (ref 98–111)
CHLORIDE BLD-SCNC: 98 MMOL/L (ref 98–111)
CO2: 28 MMOL/L (ref 22–29)
CO2: 28 MMOL/L (ref 22–29)
CO2: 29 MMOL/L (ref 22–29)
CREAT SERPL-MCNC: 3.8 MG/DL (ref 0.5–1.2)
CREAT SERPL-MCNC: 4.2 MG/DL (ref 0.5–1.2)
CREAT SERPL-MCNC: 4.4 MG/DL (ref 0.5–1.2)
CREAT SERPL-MCNC: 4.5 MG/DL (ref 0.5–1.2)
CREAT SERPL-MCNC: 4.8 MG/DL (ref 0.5–1.2)
DAT POLYSPECIFIC: NORMAL
DESCRIPTION BLOOD BANK: NORMAL
EOSINOPHILS ABSOLUTE: 0 K/UL (ref 0–0.6)
EOSINOPHILS RELATIVE PERCENT: 0 % (ref 0–5)
FIBRINOGEN: 260 MG/DL (ref 238–505)
GFR NON-AFRICAN AMERICAN: 12
GFR NON-AFRICAN AMERICAN: 13
GFR NON-AFRICAN AMERICAN: 13
GFR NON-AFRICAN AMERICAN: 14
GFR NON-AFRICAN AMERICAN: 16
GLUCOSE BLD-MCNC: 138 MG/DL (ref 74–109)
GLUCOSE BLD-MCNC: 156 MG/DL (ref 70–99)
GLUCOSE BLD-MCNC: 160 MG/DL (ref 74–109)
GLUCOSE BLD-MCNC: 182 MG/DL (ref 70–99)
GLUCOSE BLD-MCNC: 184 MG/DL (ref 70–99)
GLUCOSE BLD-MCNC: 185 MG/DL (ref 74–109)
GLUCOSE BLD-MCNC: 190 MG/DL (ref 74–109)
GLUCOSE BLD-MCNC: 213 MG/DL (ref 70–99)
GLUCOSE BLD-MCNC: 217 MG/DL (ref 74–109)
HAPTOGLOBIN: 106 MG/DL (ref 30–200)
HCT VFR BLD CALC: 23.3 % (ref 42–52)
HEMOGLOBIN: 7.4 G/DL (ref 14–18)
INR BLD: 1.99 (ref 0.88–1.18)
LYMPHOCYTES ABSOLUTE: 0.6 K/UL (ref 1.1–4.5)
LYMPHOCYTES RELATIVE PERCENT: 5.9 % (ref 20–40)
MAGNESIUM: 1.9 MG/DL (ref 1.6–2.4)
MAGNESIUM: 2 MG/DL (ref 1.6–2.4)
MCH RBC QN AUTO: 28.8 PG (ref 27–31)
MCHC RBC AUTO-ENTMCNC: 31.8 G/DL (ref 33–37)
MCV RBC AUTO: 90.7 FL (ref 80–94)
MONOCYTES ABSOLUTE: 0.7 K/UL (ref 0–0.9)
MONOCYTES RELATIVE PERCENT: 7.2 % (ref 0–10)
NEUTROPHILS ABSOLUTE: 8.5 K/UL (ref 1.5–7.5)
NEUTROPHILS RELATIVE PERCENT: 83.4 % (ref 50–65)
PDW BLD-RTO: 15.7 % (ref 11.5–14.5)
PERFORMED ON: ABNORMAL
PHOSPHORUS: 3.9 MG/DL (ref 2.5–4.5)
PHOSPHORUS: 4.2 MG/DL (ref 2.5–4.5)
PHOSPHORUS: 4.3 MG/DL (ref 2.5–4.5)
PLATELET # BLD: 96 K/UL (ref 130–400)
PMV BLD AUTO: 11.1 FL (ref 9.4–12.4)
POTASSIUM REFLEX MAGNESIUM: 3.8 MMOL/L (ref 3.5–5)
POTASSIUM REFLEX MAGNESIUM: 4 MMOL/L (ref 3.5–5)
POTASSIUM SERPL-SCNC: 3.8 MMOL/L (ref 3.5–5)
POTASSIUM SERPL-SCNC: 3.9 MMOL/L (ref 3.5–5)
POTASSIUM SERPL-SCNC: 4 MMOL/L (ref 3.5–5)
PROTHROMBIN TIME: 22.6 SEC (ref 12–14.6)
RBC # BLD: 2.57 M/UL (ref 4.7–6.1)
SODIUM BLD-SCNC: 136 MMOL/L (ref 136–145)
SODIUM BLD-SCNC: 138 MMOL/L (ref 136–145)
SODIUM BLD-SCNC: 138 MMOL/L (ref 136–145)
SODIUM BLD-SCNC: 139 MMOL/L (ref 136–145)
SODIUM BLD-SCNC: 140 MMOL/L (ref 136–145)
TOTAL PROTEIN: 4.6 G/DL (ref 6.6–8.7)
TOTAL PROTEIN: 4.6 G/DL (ref 6.6–8.7)
TROPONIN: 2.18 NG/ML (ref 0–0.03)
TROPONIN: 2.26 NG/ML (ref 0–0.03)
TROPONIN: 2.56 NG/ML (ref 0–0.03)
WBC # BLD: 10.1 K/UL (ref 4.8–10.8)

## 2018-10-27 PROCEDURE — 2700000000 HC OXYGEN THERAPY PER DAY

## 2018-10-27 PROCEDURE — 83010 ASSAY OF HAPTOGLOBIN QUANT: CPT

## 2018-10-27 PROCEDURE — 99233 SBSQ HOSP IP/OBS HIGH 50: CPT | Performed by: SURGERY

## 2018-10-27 PROCEDURE — 80053 COMPREHEN METABOLIC PANEL: CPT

## 2018-10-27 PROCEDURE — 85730 THROMBOPLASTIN TIME PARTIAL: CPT

## 2018-10-27 PROCEDURE — 99232 SBSQ HOSP IP/OBS MODERATE 35: CPT | Performed by: INTERNAL MEDICINE

## 2018-10-27 PROCEDURE — 2500000003 HC RX 250 WO HCPCS: Performed by: INTERNAL MEDICINE

## 2018-10-27 PROCEDURE — 84484 ASSAY OF TROPONIN QUANT: CPT

## 2018-10-27 PROCEDURE — 94660 CPAP INITIATION&MGMT: CPT

## 2018-10-27 PROCEDURE — C9113 INJ PANTOPRAZOLE SODIUM, VIA: HCPCS | Performed by: FAMILY MEDICINE

## 2018-10-27 PROCEDURE — 85384 FIBRINOGEN ACTIVITY: CPT

## 2018-10-27 PROCEDURE — 82140 ASSAY OF AMMONIA: CPT

## 2018-10-27 PROCEDURE — 84100 ASSAY OF PHOSPHORUS: CPT

## 2018-10-27 PROCEDURE — 85610 PROTHROMBIN TIME: CPT

## 2018-10-27 PROCEDURE — 2100000000 HC CCU R&B

## 2018-10-27 PROCEDURE — 36415 COLL VENOUS BLD VENIPUNCTURE: CPT

## 2018-10-27 PROCEDURE — 99231 SBSQ HOSP IP/OBS SF/LOW 25: CPT | Performed by: INTERNAL MEDICINE

## 2018-10-27 PROCEDURE — 2500000003 HC RX 250 WO HCPCS: Performed by: EMERGENCY MEDICINE

## 2018-10-27 PROCEDURE — 2580000003 HC RX 258: Performed by: PHYSICIAN ASSISTANT

## 2018-10-27 PROCEDURE — 2580000003 HC RX 258: Performed by: INTERNAL MEDICINE

## 2018-10-27 PROCEDURE — 2580000003 HC RX 258: Performed by: FAMILY MEDICINE

## 2018-10-27 PROCEDURE — 85025 COMPLETE CBC W/AUTO DIFF WBC: CPT

## 2018-10-27 PROCEDURE — 36592 COLLECT BLOOD FROM PICC: CPT

## 2018-10-27 PROCEDURE — 2580000003 HC RX 258: Performed by: EMERGENCY MEDICINE

## 2018-10-27 PROCEDURE — 82948 REAGENT STRIP/BLOOD GLUCOSE: CPT

## 2018-10-27 PROCEDURE — 6360000002 HC RX W HCPCS: Performed by: FAMILY MEDICINE

## 2018-10-27 PROCEDURE — 83735 ASSAY OF MAGNESIUM: CPT

## 2018-10-27 PROCEDURE — 36430 TRANSFUSION BLD/BLD COMPNT: CPT

## 2018-10-27 PROCEDURE — 6360000002 HC RX W HCPCS: Performed by: INTERNAL MEDICINE

## 2018-10-27 PROCEDURE — 6370000000 HC RX 637 (ALT 250 FOR IP): Performed by: INTERNAL MEDICINE

## 2018-10-27 PROCEDURE — P9016 RBC LEUKOCYTES REDUCED: HCPCS

## 2018-10-27 RX ORDER — SODIUM CHLORIDE 0.9 % (FLUSH) 0.9 %
10 SYRINGE (ML) INJECTION PRN
Status: DISCONTINUED | OUTPATIENT
Start: 2018-10-27 | End: 2018-11-02 | Stop reason: HOSPADM

## 2018-10-27 RX ORDER — 0.9 % SODIUM CHLORIDE 0.9 %
250 INTRAVENOUS SOLUTION INTRAVENOUS ONCE
Status: COMPLETED | OUTPATIENT
Start: 2018-10-27 | End: 2018-10-28

## 2018-10-27 RX ADMIN — LINEZOLID 600 MG: 600 INJECTION, SOLUTION INTRAVENOUS at 06:22

## 2018-10-27 RX ADMIN — CEFEPIME 1 G: 1 INJECTION, POWDER, FOR SOLUTION INTRAMUSCULAR; INTRAVENOUS at 15:33

## 2018-10-27 RX ADMIN — LINEZOLID 600 MG: 600 INJECTION, SOLUTION INTRAVENOUS at 18:39

## 2018-10-27 RX ADMIN — METRONIDAZOLE 500 MG: 500 INJECTION, SOLUTION INTRAVENOUS at 09:03

## 2018-10-27 RX ADMIN — Medication 10 ML: at 09:07

## 2018-10-27 RX ADMIN — PANTOPRAZOLE SODIUM 40 MG: 40 INJECTION, POWDER, FOR SOLUTION INTRAVENOUS at 09:03

## 2018-10-27 RX ADMIN — INSULIN LISPRO 1 UNITS: 100 INJECTION, SOLUTION INTRAVENOUS; SUBCUTANEOUS at 20:35

## 2018-10-27 RX ADMIN — Medication 10 ML: at 09:09

## 2018-10-27 RX ADMIN — Medication 10 ML: at 19:41

## 2018-10-27 RX ADMIN — NOREPINEPHRINE BITARTRATE 5 MCG/MIN: 1 INJECTION INTRAVENOUS at 00:11

## 2018-10-27 RX ADMIN — SODIUM CHLORIDE 250 ML: 9 INJECTION, SOLUTION INTRAVENOUS at 13:30

## 2018-10-27 RX ADMIN — METRONIDAZOLE 500 MG: 500 INJECTION, SOLUTION INTRAVENOUS at 16:01

## 2018-10-27 RX ADMIN — METRONIDAZOLE 500 MG: 500 INJECTION, SOLUTION INTRAVENOUS at 23:30

## 2018-10-27 ASSESSMENT — ENCOUNTER SYMPTOMS
DIARRHEA: 0
SHORTNESS OF BREATH: 1
VOMITING: 0
WHEEZING: 0
EYE REDNESS: 0
BLOOD IN STOOL: 0
NAUSEA: 0
ROS SKIN COMMENTS: LEFT KNEE SURGICAL INCISION
SORE THROAT: 0
BACK PAIN: 0
BACK PAIN: 1
EYE PAIN: 0
EYE DISCHARGE: 0
ABDOMINAL PAIN: 0
CONSTIPATION: 0
GASTROINTESTINAL NEGATIVE: 1
COUGH: 0
EYES NEGATIVE: 1

## 2018-10-27 ASSESSMENT — PAIN SCALES - GENERAL
PAINLEVEL_OUTOF10: 0

## 2018-10-27 NOTE — PROGRESS NOTES
Subjective:     Post-Operative Day: 12 Status Post left TKA. Pt is awake and alert. Stable. He is non nasal cannula O2. No ortho issues. Systemic or Specific Complaints: no nausea and no vomiting Pain 3. Objective:     Patient Vitals for the past 24 hrs:   BP Temp Temp src Pulse Resp SpO2 Weight   10/27/18 1001 (!) 100/42 - - 74 15 95 % -   10/27/18 0630 (!) 106/48 - - 70 17 94 % -   10/27/18 0600 (!) 118/52 - - 80 19 92 % -   10/27/18 0500 (!) 102/51 - - 69 15 91 % -   10/27/18 0430 (!) 106/53 - - 71 18 96 % -   10/27/18 0400 (!) 110/50 97.8 °F (36.6 °C) Temporal 71 20 96 % 285 lb 14.4 oz (129.7 kg)   10/27/18 0330 (!) 106/52 - - 70 17 95 % -   10/27/18 0300 (!) 96/50 - - 71 18 93 % -   10/27/18 0230 (!) 108/49 - - 71 17 95 % -   10/27/18 0203 (!) 107/44 - - 75 20 97 % -   10/27/18 0132 (!) 105/55 - - 71 16 96 % -   10/27/18 0100 (!) 104/47 - - 73 15 95 % -   10/27/18 0030 (!) 107/41 - - 70 16 97 % -   10/27/18 0000 (!) 63/37 97.8 °F (36.6 °C) Temporal 69 16 97 % -   10/26/18 2330 108/62 - - 73 16 95 % -   10/26/18 2300 (!) 88/56 - - 68 14 98 % -   10/26/18 2230 (!) 98/46 - - 69 16 99 % -   10/26/18 2202 (!) 100/51 - - 70 17 99 % -   10/26/18 2130 (!) 100/58 - - 67 16 99 % -   10/26/18 2102 109/61 - - 69 15 100 % -   10/26/18 2030 (!) 108/53 - - 67 15 99 % -   10/26/18 2000 (!) 131/54 97.8 °F (36.6 °C) Temporal 71 14 98 % -   10/26/18 1930 (!) 92/49 - - 68 16 97 % -   10/26/18 1900 (!) 105/54 - - 67 17 99 % -   10/26/18 1800 (!) 91/55 - - 67 14 98 % -   10/26/18 1700 (!) 96/51 - - 65 15 95 % -   10/26/18 1600 - - - 64 17 - -   10/26/18 1549 - - - - 19 - -   10/26/18 1514 93/61 97.9 °F (36.6 °C) - 62 18 - -   10/26/18 1500 93/61 - - 61 18 - -   10/26/18 1300 (!) 93/51 - - 61 18 - -   10/26/18 1200 96/66 - - 60 17 - -   10/26/18 1100 (!) 99/50 - - 62 18 - -       General: alert, appears stated age, cooperative, no distress and moderately obese   Exam:  His leg is warm and pink. No drainage from incision.

## 2018-10-27 NOTE — PROGRESS NOTES
[facility] General Surgery Progress Note    SUBJECTIVE:  Mr. Joey Ferris is a 69 y.o. male is seen and examined at bedside. He notes his abdominal pain has improved. He is awake and alert and states he is feeling better. He denies fever, chills, chest pain, SOB, n/v/d/c.      OBJECTIVE:  BP (!) 100/42   Pulse 74   Temp 97.8 °F (36.6 °C) (Temporal)   Resp 15   Ht 6' (1.829 m)   Wt 285 lb 14.4 oz (129.7 kg)   SpO2 95%   BMI 38.78 kg/m²   Physical Exam   Constitutional: He is oriented to person, place, and time. He appears well-developed. No distress. Cardiovascular: Normal rate. Pulmonary/Chest: Effort normal.   Abdominal: Soft. He exhibits no distension. There is no tenderness. There is no guarding. Neurological: He is alert and oriented to person, place, and time. Skin: Skin is warm and dry. He is not diaphoretic. Psychiatric: He has a normal mood and affect. His behavior is normal.       CBC:   Recent Labs      10/25/18   0320  10/26/18   0300  10/27/18   0741   WBC  21.3*  15.1*  10.1   HGB  8.0*  7.3*  7.4*   PLT  144  111*  96*     BMP:    Recent Labs      10/26/18   2230  10/27/18   0245  10/27/18   0741   NA  140  140  139   K  3.8  4.0  4.0   CL  98  97*  98   CO2  29  28  29   BUN  32*  36*  40*   CREATININE  3.5*  3.8*  4.2*   GLUCOSE  110*  138*  160*       ASSESSMENT:  Principal Problem:    Septic shock (HCC)  Active Problems:    Sepsis (HCC)    Acute liver failure without hepatic coma    Acute kidney injury (HCC)    CHF (congestive heart failure) (HCC)    Gross hematuria    Bilateral pleural effusion    Acute respiratory failure with hypoxia (HCC)    Elevated troponin    Coagulopathy (HCC)    Hyperkalemia    Hypochloremia    Metabolic acidosis    Cardiogenic shock (HCC)    Palliative care patient    Shock liver  Resolved Problems:    * No resolved hospital problems. *      PLAN:  Patient is improving. Continue conservative therapy. Cardiac Diet. Covering for Dr. Charley Heart.      Magdalene Matt,

## 2018-10-27 NOTE — PLAN OF CARE
Problem: Falls - Risk of:  Goal: Will remain free from falls  Will remain free from falls   Outcome: Met This Shift    Goal: Absence of physical injury  Absence of physical injury   Outcome: Met This Shift      Problem: Bleeding:  Goal: Will show no signs and symptoms of excessive bleeding  Will show no signs and symptoms of excessive bleeding   Outcome: Met This Shift      Problem: Infection - Central Venous Catheter-Associated Bloodstream Infection:  Goal: Will show no infection signs and symptoms  Will show no infection signs and symptoms   Outcome: Met This Shift      Problem: Urinary Elimination:  Goal: Signs and symptoms of infection will decrease  Signs and symptoms of infection will decrease   Outcome: Met This Shift    Goal: Complications related to the disease process, condition or treatment will be avoided or minimized  Complications related to the disease process, condition or treatment will be avoided or minimized   Outcome: Met This Shift      Problem: Risk for Impaired Skin Integrity  Goal: Tissue integrity - skin and mucous membranes  Structural intactness and normal physiological function of skin and  mucous membranes.    Outcome: Met This Shift      Problem: Nutrition  Goal: Optimal nutrition therapy  Nutrition Problem: Inadequate oral intake  Intervention: Food and/or Nutrient Delivery: Continue NPO  Nutritional Goals: meet nutritional needs through po intake or alternate source of nutrition     Outcome: Met This Shift

## 2018-10-27 NOTE — PROGRESS NOTES
Re-assessment completed, see flow sheets. Patient BP 63/47 verified with manual cuff, levophed restarted at this time and will try and wean back off. No s/s of distress, no s/s of low BP noted. Patient is alert and oriented. Will continue to monitor.   Electronically signed by Stefano Terrazas RN on 10/27/2018 at 12:33 AM'

## 2018-10-27 NOTE — PROGRESS NOTES
dopamine and Levophed.     Ashwin had a left total knee replacement on 10/15/2018 by Dr. Te Norman. He was being anticoagulated with Eliquis 2.5 mg twice a day with his last dose being taken on 10/22/2018. Traci Ventura presented with an INR of 2.82, PT 29.8, and PTT of 38.5 on 10/23/2018.     Repeat INR of 6.77, PT 59.7, and PTT 41.7 was obtained on 10/24/2018.    10/27/18: no new issues or concerns    Review of Systems   Constitutional: Positive for fatigue. Negative for chills, diaphoresis and fever. HENT: Negative. Negative for congestion, ear pain, hearing loss, nosebleeds, sore throat and tinnitus. Eyes: Negative. Negative for pain, discharge and redness. Respiratory: Positive for shortness of breath. Negative for cough and wheezing. Cardiovascular: Negative. Negative for chest pain, palpitations and leg swelling. Gastrointestinal: Negative. Negative for abdominal pain, blood in stool, constipation, diarrhea, nausea and vomiting. Endocrine: Negative for polydipsia. Genitourinary: Negative for dysuria, flank pain, frequency, hematuria and urgency. Musculoskeletal: Negative. Negative for back pain, myalgias and neck pain. Skin: Negative. Negative for rash. Left knee surgical incision     Neurological: Positive for weakness. Negative for dizziness, tremors, seizures and headaches. Hematological: Does not bruise/bleed easily. Psychiatric/Behavioral: Negative. The patient is not nervous/anxious.         Current Pain Assessment  Pain Assessment  Pain Assessment: 0-10  Pain Level: 0  Pain Intervention(s): Repositioned  Response to Pain Intervention: Patient Satisfied    OBJECTIVE:  VITALS: BP (!) 100/42   Pulse 74   Temp 97.8 °F (36.6 °C) (Temporal)   Resp 15   Ht 6' (1.829 m)   Wt 285 lb 14.4 oz (129.7 kg)   SpO2 95%   BMI 38.78 kg/m²   I&O:     Intake/Output Summary (Last 24 hours) at 10/27/18 1431  Last data filed at 10/27/18 1145   Gross per 24 hour   Intake          1413.86 ml

## 2018-10-27 NOTE — PROGRESS NOTES
4379    phytonadione (VITAMIN K) tablet 5 mg  5 mg Oral Once ELISABETH Tolbert        norepinephrine (LEVOPHED) 16 mg in dextrose 5 % 250 mL infusion  5 mcg/min Intravenous Continuous Darlene Gamez MD   Stopped at 10/27/18 0171    DOPamine (INTROPIN) 400 mg in dextrose 5 % 250 mL infusion  5 mcg/kg/min Intravenous Continuous Darlene Gamez MD   Stopped at 10/24/18 1004    pantoprazole (PROTONIX) injection 40 mg  40 mg Intravenous Daily [place] Orantes, DO   40 mg at 10/27/18 2650    And    sodium chloride (PF) 0.9 % injection 10 mL  10 mL Intravenous Daily [facility], DO   10 mL at 10/27/18 0907    vasopressin 20 Units in dextrose 5 % 100 mL infusion  0.03 Units/min Intravenous Continuous [facility], DO   Stopped at 10/24/18 0242    sodium polystyrene (KAYEXALATE) 15 GM/60ML suspension 15 g  15 g Rectal Once Fady Delacruz DO            Labs:     Recent Labs      10/25/18   0320  10/26/18   0300  10/27/18   0741   WBC  21.3*  15.1*  10.1   RBC  2.74*  2.49*  2.57*   HGB  8.0*  7.3*  7.4*   HCT  24.7*  22.7*  23.3*   MCV  90.1  91.2  90.7   MCH  29.2  29.3  28.8   MCHC  32.4*  32.2*  31.8*   PLT  144  111*  96*     Recent Labs      10/26/18   2230  10/27/18   0245  10/27/18   0741   NA  140  140  139   K  3.8  4.0  4.0   ANIONGAP  13  15  12   CL  98  97*  98   CO2  29  28  29   BUN  32*  36*  40*   CREATININE  3.5*  3.8*  4.2*   GLUCOSE  110*  138*  160*   CALCIUM  7.8*  8.0*  7.9*     Recent Labs      10/26/18   2230  10/27/18   0245  10/27/18   0741   MG  1.9  2.0  2.0   PHOS  3.6  3.9  4.3     Recent Labs      10/26/18   1230  10/26/18   1830  10/27/18   0741   AST  1,507*  1,486*  861*   ALT  2,418*  2,674*  1,840*   BILITOT  6.1*  8.2*  7.8*   ALKPHOS  159*  191*  164*     ABGs:  Recent Labs      10/24/18   1618  10/25/18   0440   PHART  7.520*  7.430   RUB5AGN  36.0  46.0*   PO2ART  78.0*  85.0   NLA2PSO  29.4*  30.5*   BEART  6.2*  5.6*   HGBAE  9.1*  8.4*   [id number]  95.1  95.1   CARBOXHGBART  2.3  2.6   02THERAPY

## 2018-10-27 NOTE — PROGRESS NOTES
21.3*  15.1*  10.1   RBC  2.74*  2.49*  2.57*   HGB  8.0*  7.3*  7.4*   HCT  24.7*  22.7*  23.3*   MCV  90.1  91.2  90.7   MCH  29.2  29.3  28.8   MCHC  32.4*  32.2*  31.8*   RDW  13.7  13.6  15.7*   PLT  144  111*  96*   MPV  11.5  11.5  11.1   NEUTOPHILPCT  89.9*  86.1*  83.4*   LYMPHOPCT  4.2*  6.3*  5.9*   MONOPCT  3.0  4.4  7.2   EOSRELPCT  0.0  0.1  0.0   BASOPCT  0.1  0.1  0.1   NEUTROABS  19.1*  13.0*  8.5*   LYMPHSABS  0.9*  1.0*  0.6*   MONOSABS  0.60  0.70  0.70   EOSABS  0.00  0.00  0.00   BASOSABS  0.00  0.00  0.00       CMP/BMP:  Recent Labs      10/26/18   1230  10/26/18   1830  10/26/18   2230  10/27/18   0245  10/27/18   0741   NA  135*  140  138  140  140  139   K  4.0  4.0  3.3*  3.4*  3.8  4.0  4.0   CL  94*  96*  95*  98  97*  98   CO2  27  31*  31*  29  28  29   ANIONGAP  14  13  12  13  15  12   GLUCOSE  129*  98  97  110*  138*  160*   BUN  46*  20  20  32*  36*  40*   CREATININE  4.8*  2.2*  2.2*  3.5*  3.8*  4.2*   LABGLOM  12*  29*  29*  17*  16*  14*   CALCIUM  7.4*  7.9*  7.9*  7.8*  8.0*  7.9*   PROT  4.4*  5.3*   --    --   4.6*   LABALBU  2.6*  3.2*   --    --   2.7*   BILITOT  6.1*  8.2*   --    --   7.8*   ALKPHOS  159*  191*   --    --   164*   ALT  2,418*  2,674*   --    --   1,840*   AST  1,507*  1,486*   --    --   861*               Culture Results:    No results for input(s): CXSURG in the last 720 hours. Blood Culture Recent:   Recent Labs      10/23/18   1015  10/23/18   0538   BC  No Growth to date. Any change in status will be called. No Growth to date. Any change in status will be called. RADIOLOGY   XR CHEST PORTABLE [[id number]] Resulted: 10/25/18 1401      Order Status: Completed Updated: 10/25/18 1403     Narrative:       XR CHEST PORTABLE 10/25/2018 12:30 PM  HISTORY: PIC catheter placement  COMPARISON: October 24, 2018. FINDINGS:   The left lung is clear. There is a persistent opacity in the right  lung base.  This may be residual pulmonary without oral or  intravenous contrast enhancement. The images are acquired in axial  plane with subsequent reconstruction in coronal and sagittal planes. The comparison is made with the previous study dated 8/7/2017. There are extensive artifacts which limit the diagnostic yield of the  study. The lung bases included in the study show small bibasilar pleural  effusion, more on the right side and a mild by basilar dependent  atelectasis and compression atelectasis. Atheromatous changes of the  thoracic aorta and coronary arteries are seen. The visualized liver and spleen appear unremarkable. There is moderate diffuse thickening of the gallbladder wall with  small amount of fluid in the gallbladder fossa. No radiopaque calculi  are seen. The common bile duct is not dilated. The pancreas appear normal. The pancreatic duct is not dilated. The adrenal glands bilaterally appear normal.  The kidneys are unremarkable. There is bilateral perirenal fat  infiltration. No evidence of hydronephrosis. The ureters are not  optimally visualized. The urinary bladder is decompressed. There is an  apparent soft tissue density located posteriorly at the base of the  urinary bladder which may be extrinsic pressure of the enlarged  prostate? Duane Bourdon This may be further evaluated. The prostate is moderately enlarged. There are small fat-containing inguinal hernias bilaterally. The stomach appears normal. Normal small bowel is seen. Appendix is  not visualized. There are no finding to suggest appendicitis. Moderate  gas and stool are seen in the colon. There are severe atheromatous changes of the abdominal aorta and iliac  arteries. No aneurysmal dilatation or dissection. There is a mild retroperitoneal para-aortic lymphadenopathy. There is a small amount of ascites particularly in the right upper  abdomen in the paracolic gutter. Severe chronic degenerative changes of the lumbar spine are seen with  a mild levoscoliosis.      Impression:       A small amount of abdominal ascites. Moderate thickening of the wall of the gallbladder with a fluid in the  gallbladder fossa may represent acute cholecystitis. No gallstones are  noted. This may be clinically correlated and further evaluated. The urinary bladder is decompressed. There is an apparent soft tissue  density located posteriorly at the floor of the urinary bladder which  may be extrinsic pressure from the enlarged prostate. This may be  clinically correlated. A small bibasilar pleural effusion, more on the right side. The severe atheromatous changes of the abdominal aorta and iliac  arteries. The above study was initially reviewed and reported by [facility]. I do  not find any discrepancies. Patient Active Problem List   Diagnosis    Diverticulitis of large intestine with perforation without bleeding    Generalized abdominal pain    Colonic diverticular abscess    Bilateral carotid artery stenosis    Atherosclerosis of native artery of both lower extremities with intermittent claudication (HCC)    Carotid stenosis, asymptomatic, left    Primary osteoarthritis of left knee    Arthritis of knee    Essential hypertension    Pure hypercholesterolemia    Slow transit constipation    Iron deficiency anemia    CKD (chronic kidney disease), stage II    GERD (gastroesophageal reflux disease)    Septic shock (HCC)    Sepsis ([address]    Acute liver failure without hepatic coma    Acute kidney injury ([address]    CHF (congestive heart failure) (HCC)    Gross hematuria    Bilateral pleural effusion    Acute respiratory failure with hypoxia (HCC)    Elevated troponin    Coagulopathy (HCC)    Hyperkalemia    Hypochloremia    Metabolic acidosis    Cardiogenic shock ([address]    Palliative care patient    Shock liver       IMPRESSION:  78-year-old gentleman admitted October 23, 2018 in shock.  His initially on 3 pressors,And over the last 2 days has been off and on

## 2018-10-28 LAB
ALBUMIN SERPL-MCNC: 2.5 G/DL (ref 3.5–5.2)
ALBUMIN SERPL-MCNC: 2.7 G/DL (ref 3.5–5.2)
ALP BLD-CCNC: 189 U/L (ref 40–130)
ALP BLD-CCNC: 194 U/L (ref 40–130)
ALT SERPL-CCNC: 1526 U/L (ref 5–41)
ALT SERPL-CCNC: 1590 U/L (ref 5–41)
ANION GAP SERPL CALCULATED.3IONS-SCNC: 13 MMOL/L (ref 7–19)
ANION GAP SERPL CALCULATED.3IONS-SCNC: 14 MMOL/L (ref 7–19)
ANION GAP SERPL CALCULATED.3IONS-SCNC: 14 MMOL/L (ref 7–19)
APTT: 31.7 SEC (ref 26–36.2)
AST SERPL-CCNC: 432 U/L (ref 5–40)
AST SERPL-CCNC: 511 U/L (ref 5–40)
BASOPHILS ABSOLUTE: 0 K/UL (ref 0–0.2)
BASOPHILS RELATIVE PERCENT: 0.1 % (ref 0–1)
BILIRUB SERPL-MCNC: 10.3 MG/DL (ref 0.2–1.2)
BILIRUB SERPL-MCNC: 10.3 MG/DL (ref 0.2–1.2)
BLOOD CULTURE, ROUTINE: NORMAL
BLOOD CULTURE, ROUTINE: NORMAL
BUN BLDV-MCNC: 51 MG/DL (ref 8–23)
BUN BLDV-MCNC: 54 MG/DL (ref 8–23)
BUN BLDV-MCNC: 56 MG/DL (ref 8–23)
CALCIUM SERPL-MCNC: 8.1 MG/DL (ref 8.8–10.2)
CALCIUM SERPL-MCNC: 8.1 MG/DL (ref 8.8–10.2)
CALCIUM SERPL-MCNC: 8.2 MG/DL (ref 8.8–10.2)
CHLORIDE BLD-SCNC: 94 MMOL/L (ref 98–111)
CHLORIDE BLD-SCNC: 94 MMOL/L (ref 98–111)
CHLORIDE BLD-SCNC: 96 MMOL/L (ref 98–111)
CO2: 28 MMOL/L (ref 22–29)
CO2: 28 MMOL/L (ref 22–29)
CO2: 29 MMOL/L (ref 22–29)
CREAT SERPL-MCNC: 4.9 MG/DL (ref 0.5–1.2)
CREAT SERPL-MCNC: 5.1 MG/DL (ref 0.5–1.2)
CREAT SERPL-MCNC: 5.2 MG/DL (ref 0.5–1.2)
CULTURE, BLOOD 2: NORMAL
EOSINOPHILS ABSOLUTE: 0 K/UL (ref 0–0.6)
EOSINOPHILS RELATIVE PERCENT: 0.2 % (ref 0–5)
GFR NON-AFRICAN AMERICAN: 11
GFR NON-AFRICAN AMERICAN: 11
GFR NON-AFRICAN AMERICAN: 12
GLUCOSE BLD-MCNC: 165 MG/DL (ref 70–99)
GLUCOSE BLD-MCNC: 165 MG/DL (ref 74–109)
GLUCOSE BLD-MCNC: 165 MG/DL (ref 74–109)
GLUCOSE BLD-MCNC: 175 MG/DL (ref 70–99)
GLUCOSE BLD-MCNC: 185 MG/DL (ref 70–99)
GLUCOSE BLD-MCNC: 195 MG/DL (ref 74–109)
HCT VFR BLD CALC: 25.6 % (ref 42–52)
HEMOGLOBIN: 8.1 G/DL (ref 14–18)
INR BLD: 1.78 (ref 0.88–1.18)
LYMPHOCYTES ABSOLUTE: 0.8 K/UL (ref 1.1–4.5)
LYMPHOCYTES RELATIVE PERCENT: 8.6 % (ref 20–40)
MAGNESIUM: 2 MG/DL (ref 1.6–2.4)
MCH RBC QN AUTO: 28.3 PG (ref 27–31)
MCHC RBC AUTO-ENTMCNC: 31.6 G/DL (ref 33–37)
MCV RBC AUTO: 89.5 FL (ref 80–94)
MONOCYTES ABSOLUTE: 0.8 K/UL (ref 0–0.9)
MONOCYTES RELATIVE PERCENT: 9.2 % (ref 0–10)
NEUTROPHILS ABSOLUTE: 7.1 K/UL (ref 1.5–7.5)
NEUTROPHILS RELATIVE PERCENT: 78.2 % (ref 50–65)
PDW BLD-RTO: 16 % (ref 11.5–14.5)
PERFORMED ON: ABNORMAL
PHOSPHORUS: 4.3 MG/DL (ref 2.5–4.5)
PHOSPHORUS: 4.3 MG/DL (ref 2.5–4.5)
PHOSPHORUS: 4.4 MG/DL (ref 2.5–4.5)
PLATELET # BLD: 93 K/UL (ref 130–400)
PMV BLD AUTO: 11.3 FL (ref 9.4–12.4)
POTASSIUM SERPL-SCNC: 3.7 MMOL/L (ref 3.5–5)
PROTHROMBIN TIME: 20.7 SEC (ref 12–14.6)
RBC # BLD: 2.86 M/UL (ref 4.7–6.1)
SODIUM BLD-SCNC: 136 MMOL/L (ref 136–145)
SODIUM BLD-SCNC: 136 MMOL/L (ref 136–145)
SODIUM BLD-SCNC: 138 MMOL/L (ref 136–145)
TOTAL PROTEIN: 4.4 G/DL (ref 6.6–8.7)
TOTAL PROTEIN: 4.8 G/DL (ref 6.6–8.7)
TROPONIN: 2.16 NG/ML (ref 0–0.03)
TROPONIN: 2.33 NG/ML (ref 0–0.03)
WBC # BLD: 9.1 K/UL (ref 4.8–10.8)

## 2018-10-28 PROCEDURE — C9113 INJ PANTOPRAZOLE SODIUM, VIA: HCPCS | Performed by: FAMILY MEDICINE

## 2018-10-28 PROCEDURE — 6360000002 HC RX W HCPCS: Performed by: FAMILY MEDICINE

## 2018-10-28 PROCEDURE — 2140000000 HC CCU INTERMEDIATE R&B

## 2018-10-28 PROCEDURE — 83735 ASSAY OF MAGNESIUM: CPT

## 2018-10-28 PROCEDURE — 85610 PROTHROMBIN TIME: CPT

## 2018-10-28 PROCEDURE — 99231 SBSQ HOSP IP/OBS SF/LOW 25: CPT | Performed by: SURGERY

## 2018-10-28 PROCEDURE — 84484 ASSAY OF TROPONIN QUANT: CPT

## 2018-10-28 PROCEDURE — 6370000000 HC RX 637 (ALT 250 FOR IP): Performed by: INTERNAL MEDICINE

## 2018-10-28 PROCEDURE — 2580000003 HC RX 258: Performed by: PHYSICIAN ASSISTANT

## 2018-10-28 PROCEDURE — 2700000000 HC OXYGEN THERAPY PER DAY

## 2018-10-28 PROCEDURE — 85025 COMPLETE CBC W/AUTO DIFF WBC: CPT

## 2018-10-28 PROCEDURE — 36592 COLLECT BLOOD FROM PICC: CPT

## 2018-10-28 PROCEDURE — 99232 SBSQ HOSP IP/OBS MODERATE 35: CPT | Performed by: INTERNAL MEDICINE

## 2018-10-28 PROCEDURE — 2500000003 HC RX 250 WO HCPCS: Performed by: INTERNAL MEDICINE

## 2018-10-28 PROCEDURE — 85730 THROMBOPLASTIN TIME PARTIAL: CPT

## 2018-10-28 PROCEDURE — 6360000002 HC RX W HCPCS: Performed by: INTERNAL MEDICINE

## 2018-10-28 PROCEDURE — 80053 COMPREHEN METABOLIC PANEL: CPT

## 2018-10-28 PROCEDURE — 82948 REAGENT STRIP/BLOOD GLUCOSE: CPT

## 2018-10-28 PROCEDURE — 2580000003 HC RX 258: Performed by: FAMILY MEDICINE

## 2018-10-28 PROCEDURE — 94660 CPAP INITIATION&MGMT: CPT

## 2018-10-28 PROCEDURE — 84100 ASSAY OF PHOSPHORUS: CPT

## 2018-10-28 RX ORDER — POLYETHYLENE GLYCOL 3350 17 G/17G
17 POWDER, FOR SOLUTION ORAL 3 TIMES DAILY
Status: DISCONTINUED | OUTPATIENT
Start: 2018-10-28 | End: 2018-10-28

## 2018-10-28 RX ORDER — POLYETHYLENE GLYCOL 3350 17 G/17G
17 POWDER, FOR SOLUTION ORAL DAILY
Status: DISCONTINUED | OUTPATIENT
Start: 2018-10-28 | End: 2018-11-02 | Stop reason: HOSPADM

## 2018-10-28 RX ORDER — POLYETHYLENE GLYCOL 3350 17 G/17G
17 POWDER, FOR SOLUTION ORAL
Status: DISCONTINUED | OUTPATIENT
Start: 2018-10-28 | End: 2018-11-02 | Stop reason: HOSPADM

## 2018-10-28 RX ORDER — METOPROLOL SUCCINATE 25 MG/1
25 TABLET, EXTENDED RELEASE ORAL DAILY
Status: DISCONTINUED | OUTPATIENT
Start: 2018-10-28 | End: 2018-11-02 | Stop reason: HOSPADM

## 2018-10-28 RX ADMIN — METRONIDAZOLE 500 MG: 500 INJECTION, SOLUTION INTRAVENOUS at 08:38

## 2018-10-28 RX ADMIN — METRONIDAZOLE 500 MG: 500 INJECTION, SOLUTION INTRAVENOUS at 16:02

## 2018-10-28 RX ADMIN — Medication 10 ML: at 21:05

## 2018-10-28 RX ADMIN — CEFEPIME 1 G: 1 INJECTION, POWDER, FOR SOLUTION INTRAMUSCULAR; INTRAVENOUS at 13:16

## 2018-10-28 RX ADMIN — LINEZOLID 600 MG: 600 INJECTION, SOLUTION INTRAVENOUS at 06:16

## 2018-10-28 RX ADMIN — METOPROLOL SUCCINATE 25 MG: 25 TABLET, EXTENDED RELEASE ORAL at 16:02

## 2018-10-28 RX ADMIN — POLYETHYLENE GLYCOL 3350 17 G: 17 POWDER, FOR SOLUTION ORAL at 16:02

## 2018-10-28 RX ADMIN — POLYETHYLENE GLYCOL 3350 17 G: 17 POWDER, FOR SOLUTION ORAL at 20:55

## 2018-10-28 RX ADMIN — Medication 10 ML: at 08:44

## 2018-10-28 RX ADMIN — Medication 10 ML: at 08:41

## 2018-10-28 RX ADMIN — PANTOPRAZOLE SODIUM 40 MG: 40 INJECTION, POWDER, FOR SOLUTION INTRAVENOUS at 08:39

## 2018-10-28 RX ADMIN — LINEZOLID 600 MG: 600 INJECTION, SOLUTION INTRAVENOUS at 20:53

## 2018-10-28 RX ADMIN — POLYETHYLENE GLYCOL 3350 17 G: 17 POWDER, FOR SOLUTION ORAL at 13:12

## 2018-10-28 RX ADMIN — INSULIN LISPRO 1 UNITS: 100 INJECTION, SOLUTION INTRAVENOUS; SUBCUTANEOUS at 20:54

## 2018-10-28 ASSESSMENT — ENCOUNTER SYMPTOMS
CONSTIPATION: 0
ROS SKIN COMMENTS: LEFT KNEE SURGICAL INCISION
SORE THROAT: 0
EYE REDNESS: 0
WHEEZING: 0
DIARRHEA: 0
EYE PAIN: 0
EYE DISCHARGE: 0
BACK PAIN: 1
GASTROINTESTINAL NEGATIVE: 1
ABDOMINAL PAIN: 0
VOMITING: 0
BACK PAIN: 0
SHORTNESS OF BREATH: 1
COUGH: 0
EYES NEGATIVE: 1
BLOOD IN STOOL: 0
NAUSEA: 0

## 2018-10-28 ASSESSMENT — PAIN SCALES - GENERAL
PAINLEVEL_OUTOF10: 0

## 2018-10-28 NOTE — PROGRESS NOTES
hepatic coma 10/23/2018     Priority: Low    Acute kidney injury ([facility] 10/23/2018     Priority: Low    CHF (congestive heart failure) ([facility] 10/23/2018     Priority: Low    Gross hematuria 10/23/2018     Priority: Low    Bilateral pleural effusion 10/23/2018     Priority: Low    Acute respiratory failure with hypoxia (HCC) 10/23/2018     Priority: Low    Elevated troponin 10/23/2018     Priority: Low    Coagulopathy ([facility] 10/23/2018     Priority: Low    Hyperkalemia 10/23/2018     Priority: Low    Hypochloremia 10/23/2018     Priority: Low    Metabolic acidosis 71/53/0300     Priority: Low    Palliative care patient 10/23/2018     Priority: Low    Arthritis of knee 10/15/2018     Priority: Low    Essential hypertension 10/15/2018     Priority: Low    Pure hypercholesterolemia 10/15/2018     Priority: Low    Slow transit constipation 10/15/2018     Priority: Low    Iron deficiency anemia 10/15/2018     Priority: Low    CKD (chronic kidney disease), 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/2018     Priority: Low    GERD (gastroesophageal reflux disease) 10/15/2018     Priority: Low    Primary osteoarthritis of left knee 10/14/2018     Priority: Low    Carotid stenosis, asymptomatic, left 08/28/2018     Priority: Low    Bilateral carotid artery stenosis 06/25/2018     Priority: Low    Atherosclerosis of native artery of both lower extremities with intermittent claudication ([facility] 06/25/2018     Priority: Low    Colonic diverticular abscess 08/01/2017     Priority: Low    Generalized abdominal pain      Priority: Low    Diverticulitis of large intestine with perforation without bleeding 06/04/2017     Priority: Low     Current Facility-Administered Medications   Medication Dose Route Frequency Provider Last Rate Last Dose    polyethylene glycol (GLYCOLAX) packet 17 g  17 g Oral Daily Benedicto Jacobson MD   17 g at 10/28/18 1312    polyethylene glycol (GLYCOLAX) packet 17 g  17 g Oral 3 times per day on [facility].Ultrasound guided access of left common femoral artery. Aortogram.Diagnostic right lower extremity arteriogram.Radiologic supervision and interpretation.  VASCULAR SURGERY  01/13/2015    Kiara Pond M.D Atherectomy,angioplasty,and stenting of left superficial femoral artery.  VASCULAR SURGERY  03/11/2014    Kiara Pond M.D. Ultrasound-guided access of right common femoral artery. Aortogram.Left lower extremity arteriogram.Atherectomy and angioplasty of left superficial femoral artery. Radiologic supervision and interpretation.  VASCULAR SURGERY  01/18/2013    Kiara BESS Aortogram.Multistation arteriogram right lower extremity. Laser atherectomy and angioplasty of right superficial femoral artery. Selective catheterization of right tibioperoneal trunk. Angioplasty of peroneal artery and tibioperoneal trunk.      Family History   Problem Relation Age of Onset    Colon Cancer Father     Diabetes Brother     Colon Polyps Neg Hx     Liver Cancer Neg Hx     Liver Disease Neg Hx     Esophageal Cancer Neg Hx     Rectal Cancer Neg Hx     Stomach Cancer Neg Hx      Social History   Substance Use Topics    Smoking status: Former Smoker     Quit date: 6/3/2003    Smokeless tobacco: Never Used    Alcohol use 7.2 oz/week     12 Glasses of wine per week      Comment: 2 glasses of wine every night          Review of Systems:    General:      Complaint / Symptom Yes / No / Description if Yes       Fatigue Yes:  chronic   Weight gain NA   Insomnia NA       Respiratory:        Complaint / Symptom Yes / No / Description if Yes       Cough No   Horseness NA       Cardiovascular:    Complaint / Symptom Yes / No / Description if Yes       Chest Pain No   Shortness of Air / Orthopnea Yes: chronic and stable   Presyncope / Syncope No   Palpitations No         Objective:    /71   Pulse 85   Temp 98.1 °F (36.7 °C) (Temporal)   Resp 21   Ht 6' (1.829 m)   Wt 285 lb 14.4 oz (129.7 kg)   SpO2 92%   BMI

## 2018-10-28 NOTE — PROGRESS NOTES
normal  limits. There is no hydronephrosis on the right. The left kidney measures 10.0 x 5.4 x 4.4 cm and has normal cortical  echogenicity, mild cortical thinning is present. No mass or  hydronephrosis is identified. Color flow imaging demonstrates vascular  flow within the left kidney. The bladder is decompressed by a Shultz  catheter.     Impression:       1. Mild atrophy of the left kidney with mild cortical thinning. No  renal mass or hydronephrosis. 2. 2 cysts are identified at the inferior pole the right kidney, the  largest measures 2.8 cm. This appears stable and benign. 3. The bladder is decompressed by a Shultz catheter. Signed by Dr Luis Miguel Lawrence. Holly on 10/23/2018 5:57 PM     US RENAL LIMITED [[id number]]      Order Status: Canceled      XR CHEST PORTABLE [[id number]] Resulted: 10/23/18 0745     Order Status: Completed Updated: 10/23/18 0747     Narrative:       Examination. XR CHEST PORTABLE  History: CVC insertion. A frontal portable upright view of the chest is compared with the  previous study obtained earlier today. No interval change. There is persistent pulmonary vascular congestion and pulmonary edema. There are atelectatic changes in the right lower lung. There is moderate cardiomegaly. Atheromatous changes of thoracic aorta  are noted. No pneumothorax. Postsurgical changes of the right shoulder are noted.     Impression:       No change. Persistent pulmonary vascular congestion and pulmonary edema. Discoid atelectasis right lower lung. No pneumothorax. The above finding are recorded on a digital voice clip in PACS. Signed by Dr Juno Amador on 10/23/2018 7:45 AM     CTA PULMONARY W CONTRAST [[id number]] Resulted: 10/23/18 0739     Order Status: Completed Updated: 10/23/18 0741     Narrative:       CTA PULMONARY W CONTRAST 10/23/2018 4:00 AM  HISTORY:  COMPARISON: None.    DLP: 863 mGy cm  TECHNIQUE: Helical tomographic images of the chest were obtained after  the administration of with the previous study dated 8/7/2017. There are extensive artifacts which limit the diagnostic yield of the  study. The lung bases included in the study show small bibasilar pleural  effusion, more on the right side and a mild by basilar dependent  atelectasis and compression atelectasis. Atheromatous changes of the  thoracic aorta and coronary arteries are seen. The visualized liver and spleen appear unremarkable. There is moderate diffuse thickening of the gallbladder wall with  small amount of fluid in the gallbladder fossa. No radiopaque calculi  are seen. The common bile duct is not dilated. The pancreas appear normal. The pancreatic duct is not dilated. The adrenal glands bilaterally appear normal.  The kidneys are unremarkable. There is bilateral perirenal fat  infiltration. No evidence of hydronephrosis. The ureters are not  optimally visualized. The urinary bladder is decompressed. There is an  apparent soft tissue density located posteriorly at the base of the  urinary bladder which may be extrinsic pressure of the enlarged  prostate? Dimitry Even This may be further evaluated. The prostate is moderately enlarged. There are small fat-containing inguinal hernias bilaterally. The stomach appears normal. Normal small bowel is seen. Appendix is  not visualized. There are no finding to suggest appendicitis. Moderate  gas and stool are seen in the colon. There are severe atheromatous changes of the abdominal aorta and iliac  arteries. No aneurysmal dilatation or dissection. There is a mild retroperitoneal para-aortic lymphadenopathy. There is a small amount of ascites particularly in the right upper  abdomen in the paracolic gutter. Severe chronic degenerative changes of the lumbar spine are seen with  a mild levoscoliosis.     Impression:       A small amount of abdominal ascites. Moderate thickening of the wall of the gallbladder with a fluid in the  gallbladder fossa may represent acute cholecystitis.  No gallstones are  noted. This may be clinically correlated and further evaluated. The urinary bladder is decompressed. There is an apparent soft tissue  density located posteriorly at the floor of the urinary bladder which  may be extrinsic pressure from the enlarged prostate. This may be  clinically correlated. A small bibasilar pleural effusion, more on the right side. The severe atheromatous changes of the abdominal aorta and iliac  arteries. The above study was initially reviewed and reported by [facility]. I do  not find any discrepancies. Patient Active Problem List   Diagnosis    Diverticulitis of large intestine with perforation without bleeding    Generalized abdominal pain    Colonic diverticular abscess    Bilateral carotid artery stenosis    Atherosclerosis of native artery of both lower extremities with intermittent claudication (HCC)    Carotid stenosis, asymptomatic, left    Primary osteoarthritis of left knee    Arthritis of knee    Essential hypertension    Pure hypercholesterolemia    Slow transit constipation    Iron deficiency anemia    CKD (chronic kidney disease), stage II    GERD (gastroesophageal reflux disease)    Septic shock (HCC)    Sepsis ([address]    Acute liver failure without hepatic coma    Acute kidney injury ([address]    CHF (congestive heart failure) (HCC)    Gross hematuria    Bilateral pleural effusion    Acute respiratory failure with hypoxia (HCC)    Elevated troponin    Coagulopathy (HCC)    Hyperkalemia    Hypochloremia    Metabolic acidosis    Cardiogenic shock ([address]    Palliative care patient    Shock liver       IMPRESSION:  20-year-old gentleman admitted October 23, 2018 in shock. His initially on 3 pressors,And over the last 2 days has been off and on norepinephrine There is no obvious source from an infectious standpoint has been identified.  The patient's creatinine was increasing some prior to his previous discharge and is possible

## 2018-10-28 NOTE — PROGRESS NOTES
Nephrology ([facility]) Progress Note      Patient:  Samantha Navarro  YOB: 1940  Date of Service: 10/28/2018  MRN: [id number]   Acct: [de-identified]   Primary Care Physician: Austin Elliott MD  Advance Directive: Full Code  Admit Date: 10/23/2018       Hospital Day: 5  Referring Provider: Tasia Leon MD    Patient independently seen and examined, Chart, Consults, Notes, Operative notes, Labs, Cardiology, and Radiology studies reviewed as able. Chief complaint: Abnormal lab. Subjective:  Samantha Navarro is a 69 y.o. male  whom we were consulted for acute kidney injury/metabolic acidosis/hyperkalemia. Patient has history of chronic kidney disease and sees Dr. Adrian Ayala in the office. Ziyad Manzanares He presented with severe weakness, lack of energy and encephalopathy. On presentation his blood pressure was very low, diagnosed with septic shock secondary to bad gallbladder and urinary tract infection. Patient was also given intravenous contrast twice in the ER 1st for CT angiogram of the pulmonary arteries followed by CT scan of the abdomen and pelvis. Hospital course remarkable for IV fluid resuscitation, initially requiring 3 pressors, IV antibiotics. His renal function continued to deteriorate. On October 24, he underwent right femoral hemodialysis catheter insertion followed by several hemodialysis treatments. Patient remained in CCU and all the vasopressor has been off. He has good hemodynamics. This morning He was seen in CCU, more alert and awake and feeling much better. Patient has some urine output. He might be moving from CCU to regular floor.     Allergies:  Promethazine hcl    Medicines:  Current Facility-Administered Medications   Medication Dose Route Frequency Provider Last Rate Last Dose    sodium chloride flush 0.9 % injection 10 mL  10 mL Intravenous PRN Mahnaz Johnson MD        insulin lispro (HUMALOG) injection vial 0-6 Units  0-6 Units Subcutaneous Once ELISABETH Parker        pantoprazole (PROTONIX) injection 40 mg  40 mg Intravenous Daily Thang Orantes, DO   40 mg at 10/28/18 0881    And    sodium chloride (PF) 0.9 % injection 10 mL  10 mL Intravenous Daily Thang Orantes, DO   10 mL at 10/28/18 0841    sodium polystyrene (KAYEXALATE) 15 GM/60ML suspension 15 g  15 g Rectal Once Fady Delacruz DO           Past Medical History:  Past Medical History:   Diagnosis Date    Acute liver failure without hepatic coma 10/23/2018    Back pain     \"with tired legs as a result\"    Blood circulation, collateral     Carotid arterial disease ([facility]     recent surgery    CHF (congestive heart failure) ([facility] 10/23/2018    CKD (chronic kidney disease), 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/2018    GERD (gastroesophageal reflux disease)     Hyperlipidemia     Hypertension     Hypertension     Palliative care patient 10/23/2018    Primary osteoarthritis of left knee 10/14/2018    PVD (peripheral vascular disease) [facility])        Past Surgical History:  Past Surgical History:   Procedure Laterality Date    BACK SURGERY      COLONOSCOPY  2007?  TN COLONOSCOPY FLX DX W/COLLJ SPEC WHEN PFRMD N/A 9/11/2017    Dr Cate Chin internal hemorrhoids, diverticular disease-HP-No recall (age)   Josseline Beltran TN REVISE MEDIAN N/CARPAL TUNNEL SURG Left 7/18/2018    OPEN CARPAL TUNNEL RELEASE performed by Kathy Jean Baptiste MD at [address] Left 8/28/2018    LEFT CAROTID ENDARTERECTOMY WITH VEIN PATCH ANGIOPLASTY AND COMPLETION ANGIOGRAM performed by Zelda Crowley MD at [place] [address] Left 10/15/2018    LEFT COMPLEX TOTAL KNEE ARTHROPLASTY performed by Kathy Jean Baptiste MD at [facility] VASCULAR SURGERY  04/21/2015    Holley BESS Ultrasound guided access of left common femoral artery. Aortogram.Diagnostic right lower extremity arteriogram.Radiologic supervision and interpretation.     VASCULAR SURGERY  01/13/2015 unless unable to obtain. Objective:  Blood pressure (!) 100/49, pulse 79, temperature 98.1 °F (36.7 °C), temperature source Temporal, resp. rate 17, height 6' (1.829 m), weight 285 lb 14.4 oz (129.7 kg), SpO2 92 %. Intake/Output Summary (Last 24 hours) at 10/28/18 1111  Last data filed at 10/28/18 1000   Gross per 24 hour   Intake             2880 ml   Output              228 ml   Net             2652 ml     General: awake/alert   HEENT: Normocephalic atraumatic head  Neck: Supple with no JVD  Chest:  clear to auscultation bilaterally without respiratory distress  CVS: regular rate and rhythm  Abdominal: soft, nontender, normal bowel sounds  Extremities: no cyanosis or edema  Skin: warm and dry without rash      Labs:  BMP:   Recent Labs      10/27/18   2050  10/27/18   2355  10/28/18   0300   NA  136  136  138   K  3.8  3.7  3.7   CL  94*  94*  96*   CO2  29  28  29   PHOS  4.3  4.3  4.4   BUN  50*  51*  54*   CREATININE  4.8*  4.9*  5.1*   CALCIUM  8.2*  8.1*  8.2*     CBC:   Recent Labs      10/26/18   0300  10/27/18   0741  10/28/18   0300   WBC  15.1*  10.1  9.1   HGB  7.3*  7.4*  8.1*   HCT  22.7*  23.3*  25.6*   MCV  91.2  90.7  89.5   PLT  111*  96*  93*     LIVER PROFILE:   Recent Labs      10/27/18   1245  10/27/18   2355  10/28/18   0300   AST  721*  511*  432*   ALT  1,703*  1,590*  1,526*   BILITOT  8.8*  10.3*  10.3*   ALKPHOS  173*  194*  189*     PT/INR:   Recent Labs      10/26/18   0328  10/27/18   0741  10/28/18   0300   PROTIME  26.5*  22.6*  20.7*   INR  2.43*  1.99*  1.78*     APTT:   Recent Labs      10/26/18   0815  10/27/18   0741  10/28/18   0300   APTT  30.5  31.1  31.7     BNP:  No results for input(s): BNP in the last 72 hours. Ionized Calcium:No results for input(s): IONCA in the last 72 hours.   Magnesium:  Recent Labs      10/27/18   2050  10/27/18   2355  10/28/18   0300   MG  2.0  2.0  2.0     Phosphorus:  Recent Labs      10/27/18   2050  10/27/18   2355  10/28/18   0300

## 2018-10-28 NOTE — PROGRESS NOTES
vial 0-6 Units  0-6 Units Subcutaneous TID WC Alma Car MD        insulin lispro (HUMALOG) injection vial 0-3 Units  0-3 Units Subcutaneous Nightly Alma Car MD   1 Units at 10/27/18 2035    cefepime (MAXIPIME) 1 g in sodium chloride (PF) 10 mL IV syringe  1 g Intravenous Q24H Thang Orantes DO   1 g at 10/27/18 1533    glucose (GLUTOSE) 40 % oral gel 15 g  15 g Oral PRN Alma Car MD        dextrose 50 % solution 12.5 g  12.5 g Intravenous PRN Alma Car MD        glucagon (rDNA) injection 1 mg  1 mg Intramuscular PRN Alma Car MD        dextrose 5 % solution  100 mL/hr Intravenous PRN Alma Car MD        linezolid (ZYVOX) IVPB 600 mg  600 mg Intravenous Q12H Aster Howell MD   Stopped at 10/28/18 0835    glucose (GLUTOSE) 40 % oral gel 15 g  15 g Oral PRN Alma Car MD        dextrose 50 % solution 12.5 g  12.5 g Intravenous PRN Alma Car MD        glucagon (rDNA) injection 1 mg  1 mg Intramuscular PRN Alma Car MD        dextrose 5 % solution  100 mL/hr Intravenous PRN Alma Car MD        dextrose 50 % solution 12.5 g  12.5 g Intravenous PRN Mandi Carter MD        magnesium sulfate 1 g in dextrose 5% 100 mL IVPB  1 g Intravenous PRN Zaire Richardson MD        dextrose 5 % and 0.45 % sodium chloride infusion   Intravenous Continuous PRN Zaire Richardson MD        0.9 % sodium chloride bolus  500 mL Intravenous Once Mandi Carter MD   Stopped at 10/24/18 0624    lidocaine PF 1 % injection 5 mL  5 mL Intradermal Once Isabelle Evans PA-C        sodium chloride flush 0.9 % injection 10 mL  10 mL Intravenous 2 times per day Isabelle Evans PA-C   10 mL at 10/1940    sodium chloride flush 0.9 % injection 10 mL  10 mL Intravenous PRN Isabelle Evans PA-C        metronidazole (FLAGYL) 500 mg in NaCl 100 mL IVPB premix  500 mg Intravenous Rhiannon Oscar MD   Stopped at 10/28/18 0112    kg)   SpO2 92%   BMI 38.78 kg/m²   24HR INTAKE/OUTPUT:      Intake/Output Summary (Last 24 hours) at 10/28/18 4638  Last data filed at 10/28/18 4908   Gross per 24 hour   Intake             2330 ml   Output              238 ml   Net             2092 ml     General appearance: alert and cooperative with exam  HEENT: scleral icterus improved  Neck without masses, lympadenopathy, bruit, thyroid normal  Lungs: no increased work of breathing, \"clear to auscultation bilaterally\" without rales, rhonchi or wheezes  Heart: regular rate and rhythm and S1, S2 normal  Abdomen: soft, non-tender; bowel sounds normal; no masses,  no organomegaly  Extremities: extremities normal, atraumatic, no cyanosis or edema  Neurologic: No focal neurologic deficits, normal sensation, alert and oriented, improved affect and mood appropriate. Skin: jaundice. no rashes, nodules. Assessment and Plan:   Principal Problem:    Septic shock ([address]  Active Problems:    Sepsis ([address]    Acute liver failure without hepatic coma    Acute kidney injury ([address]    CHF (congestive heart failure) (HCC)    Gross hematuria    Bilateral pleural effusion    Acute respiratory failure with hypoxia (HCC)    Elevated troponin    Coagulopathy (HCC)    Hyperkalemia    Hypochloremia    Metabolic acidosis    Cardiogenic shock ([address]    Palliative care patient    Shock liver  Resolved Problems:    * No resolved hospital problems. *    Continue abx coverage per ID. Abx day 6. GI following, LFTs trending down. Surgery following, appreciate recs. No acute intervention planned      Off pressors > 24 hours. Cardiology following. Trop remains elevated. Continue insulin SS and monitor requirements    S/p 1 unit PRBC 10/24, 10/26, 10/27. willcontinue to monitor H&H, hematology following. Will continue to hold chemical VTE ppx unless otherwise adressed by hematology    Nephro following, patient started HD 10/24, appreciate recs.   Planned for permacath

## 2018-10-28 NOTE — PROGRESS NOTES
 glucagon (rDNA) injection 1 mg  1 mg Intramuscular PRN Blaire Goncalves MD        dextrose 5 % solution  100 mL/hr Intravenous PRN Blaire Goncalves MD        dextrose 50 % solution 12.5 g  12.5 g Intravenous PRN Jennifer Butler MD        magnesium sulfate 1 g in dextrose 5% 100 mL IVPB  1 g Intravenous PRN Zaire Richardson MD        dextrose 5 % and 0.45 % sodium chloride infusion   Intravenous Continuous PRN Jennifer Butler MD        0.9 % sodium chloride bolus  500 mL Intravenous Once Jennifer Butler MD   Stopped at 10/24/18 0624    lidocaine PF 1 % injection 5 mL  5 mL Intradermal Once Theresa Lou PA-C        sodium chloride flush 0.9 % injection 10 mL  10 mL Intravenous 2 times per day Theresa Lou PA-C   10 mL at 10/28/18 0844    sodium chloride flush 0.9 % injection 10 mL  10 mL Intravenous PRN Theresa Lou PA-C        metronidazole (FLAGYL) 500 mg in NaCl 100 mL IVPB premix  500 mg Intravenous Q8H Blaire Goncalves  mL/hr at 10/28/18 0838 500 mg at 10/28/18 1557    phytonadione (VITAMIN K) tablet 5 mg  5 mg Oral Once ELISABETH Tolbert        pantoprazole (PROTONIX) injection 40 mg  40 mg Intravenous Daily [facility], DO   40 mg at 10/28/18 5358    And    sodium chloride (PF) 0.9 % injection 10 mL  10 mL Intravenous Daily [facility], DO   10 mL at 10/28/18 0841    sodium polystyrene (KAYEXALATE) 15 GM/60ML suspension 15 g  15 g Rectal Once Jesus Plummer DO           Allergies  Promethazine hcl     ASSESSMENT/PLAN:  Coagulopathy secondary to acute liver failure and Eliquis in the context of acute kidney injury. · Last dose of Eliquis taken 10/22/2018 ( p.m.  Dose)  · Admitting INR of 2.82, PT 29.8, and PTT of 38.5  · INR 2.43  · PT 26.5  · PTT 37.1 on 10/25/2018  · Fibrinogen 364 on 10/24/2018  · Haptoglobin 10/27/18 106     Kcentra 5000 units ( 500 units/kg) ×1 on 10/24/2018  Vitamin K 5 mg PO ×1 on 10/24/2018    Thrombocytopenia- suspect consumption secondary to acute

## 2018-10-28 NOTE — PROGRESS NOTES
improve  2. Cardiogenic shock in resolution  3 ATN in resolution responding to hemodialysis      Plan:   Continue with conservative treatment.   Will order MiraLAX until an evacuation    Caty Pelletier MD  10/28/2018 11:34 AM

## 2018-10-28 NOTE — PROGRESS NOTES
THROMBOENDARTECTMY NECK,NECK INCIS Left 8/28/2018    LEFT CAROTID ENDARTERECTOMY WITH VEIN PATCH ANGIOPLASTY AND COMPLETION ANGIOGRAM performed by Marcio Green MD at [place] [address] Left 10/15/2018    LEFT COMPLEX TOTAL KNEE ARTHROPLASTY performed by Lisa Torres MD at [facility] VASCULAR SURGERY  04/21/2015    Edison BESS Ultrasound guided access of left common femoral artery. Aortogram.Diagnostic right lower extremity arteriogram.Radiologic supervision and interpretation.  VASCULAR SURGERY  01/13/2015    Edison Manjarrez M.D Atherectomy,angioplasty,and stenting of left superficial femoral artery.  VASCULAR SURGERY  03/11/2014    Edison Manjarrez M.D. Ultrasound-guided access of right common femoral artery. Aortogram.Left lower extremity arteriogram.Atherectomy and angioplasty of left superficial femoral artery. Radiologic supervision and interpretation.  VASCULAR SURGERY  01/18/2013    Edison BESS Aortogram.Multistation arteriogram right lower extremity. Laser atherectomy and angioplasty of right superficial femoral artery. Selective catheterization of right tibioperoneal trunk. Angioplasty of peroneal artery and tibioperoneal trunk.      Family History   Problem Relation Age of Onset    Colon Cancer Father     Diabetes Brother     Colon Polyps Neg Hx     Liver Cancer Neg Hx     Liver Disease Neg Hx     Esophageal Cancer Neg Hx     Rectal Cancer Neg Hx     Stomach Cancer Neg Hx      Social History   Substance Use Topics    Smoking status: Former Smoker     Quit date: 6/3/2003    Smokeless tobacco: Never Used    Alcohol use 7.2 oz/week     12 Glasses of wine per week      Comment: 2 glasses of wine every night          Review of Systems:    General:      Complaint / Symptom Yes / No / Description if Yes       Fatigue Yes:  chronic   Weight gain NA   Insomnia NA       Respiratory:        Complaint / Symptom Yes / No / Description

## 2018-10-29 LAB
ALBUMIN SERPL-MCNC: 2.7 G/DL (ref 3.5–5.2)
ALP BLD-CCNC: 190 U/L (ref 40–130)
ALT SERPL-CCNC: 1136 U/L (ref 5–41)
ANION GAP SERPL CALCULATED.3IONS-SCNC: 16 MMOL/L (ref 7–19)
APTT: 32.7 SEC (ref 26–36.2)
AST SERPL-CCNC: 231 U/L (ref 5–40)
BASOPHILS ABSOLUTE: 0 K/UL (ref 0–0.2)
BASOPHILS RELATIVE PERCENT: 0.3 % (ref 0–1)
BILIRUB SERPL-MCNC: 9.1 MG/DL (ref 0.2–1.2)
BUN BLDV-MCNC: 63 MG/DL (ref 8–23)
CALCIUM SERPL-MCNC: 8.1 MG/DL (ref 8.8–10.2)
CHLORIDE BLD-SCNC: 92 MMOL/L (ref 98–111)
CO2: 25 MMOL/L (ref 22–29)
CREAT SERPL-MCNC: 6 MG/DL (ref 0.5–1.2)
EOSINOPHILS ABSOLUTE: 0 K/UL (ref 0–0.6)
EOSINOPHILS RELATIVE PERCENT: 0 % (ref 0–5)
GFR NON-AFRICAN AMERICAN: 9
GLUCOSE BLD-MCNC: 117 MG/DL (ref 70–99)
GLUCOSE BLD-MCNC: 153 MG/DL (ref 70–99)
GLUCOSE BLD-MCNC: 171 MG/DL (ref 70–99)
GLUCOSE BLD-MCNC: 175 MG/DL (ref 74–109)
GLUCOSE BLD-MCNC: 192 MG/DL (ref 70–99)
HCT VFR BLD CALC: 25.6 % (ref 42–52)
HEMOGLOBIN: 8.3 G/DL (ref 14–18)
INR BLD: 1.65 (ref 0.88–1.18)
LYMPHOCYTES ABSOLUTE: 0.7 K/UL (ref 1.1–4.5)
LYMPHOCYTES RELATIVE PERCENT: 8.3 % (ref 20–40)
MAGNESIUM: 1.9 MG/DL (ref 1.6–2.4)
MCH RBC QN AUTO: 28.6 PG (ref 27–31)
MCHC RBC AUTO-ENTMCNC: 32.4 G/DL (ref 33–37)
MCV RBC AUTO: 88.3 FL (ref 80–94)
MONOCYTES ABSOLUTE: 0.9 K/UL (ref 0–0.9)
MONOCYTES RELATIVE PERCENT: 11.4 % (ref 0–10)
NEUTROPHILS ABSOLUTE: 6.1 K/UL (ref 1.5–7.5)
NEUTROPHILS RELATIVE PERCENT: 77.3 % (ref 50–65)
PDW BLD-RTO: 15.8 % (ref 11.5–14.5)
PERFORMED ON: ABNORMAL
PHOSPHORUS: 4.5 MG/DL (ref 2.5–4.5)
PLATELET # BLD: 98 K/UL (ref 130–400)
PMV BLD AUTO: 11.3 FL (ref 9.4–12.4)
POTASSIUM REFLEX MAGNESIUM: 3.7 MMOL/L (ref 3.5–5)
PROTHROMBIN TIME: 19.5 SEC (ref 12–14.6)
RBC # BLD: 2.9 M/UL (ref 4.7–6.1)
SODIUM BLD-SCNC: 133 MMOL/L (ref 136–145)
TOTAL PROTEIN: 4.7 G/DL (ref 6.6–8.7)
TROPONIN: 2.06 NG/ML (ref 0–0.03)
WBC # BLD: 7.8 K/UL (ref 4.8–10.8)

## 2018-10-29 PROCEDURE — 97116 GAIT TRAINING THERAPY: CPT

## 2018-10-29 PROCEDURE — 80053 COMPREHEN METABOLIC PANEL: CPT

## 2018-10-29 PROCEDURE — 85730 THROMBOPLASTIN TIME PARTIAL: CPT

## 2018-10-29 PROCEDURE — 82948 REAGENT STRIP/BLOOD GLUCOSE: CPT

## 2018-10-29 PROCEDURE — 8010000000 HC HEMODIALYSIS ACUTE INPT

## 2018-10-29 PROCEDURE — 6360000002 HC RX W HCPCS: Performed by: FAMILY MEDICINE

## 2018-10-29 PROCEDURE — 2580000003 HC RX 258: Performed by: FAMILY MEDICINE

## 2018-10-29 PROCEDURE — G8981 BODY POS CURRENT STATUS: HCPCS

## 2018-10-29 PROCEDURE — 51702 INSERT TEMP BLADDER CATH: CPT

## 2018-10-29 PROCEDURE — C9113 INJ PANTOPRAZOLE SODIUM, VIA: HCPCS | Performed by: FAMILY MEDICINE

## 2018-10-29 PROCEDURE — 6370000000 HC RX 637 (ALT 250 FOR IP): Performed by: INTERNAL MEDICINE

## 2018-10-29 PROCEDURE — 6360000002 HC RX W HCPCS: Performed by: INTERNAL MEDICINE

## 2018-10-29 PROCEDURE — 2580000003 HC RX 258: Performed by: PHYSICIAN ASSISTANT

## 2018-10-29 PROCEDURE — 5A1D70Z PERFORMANCE OF URINARY FILTRATION, INTERMITTENT, LESS THAN 6 HOURS PER DAY: ICD-10-PCS | Performed by: INTERNAL MEDICINE

## 2018-10-29 PROCEDURE — 2500000003 HC RX 250 WO HCPCS: Performed by: INTERNAL MEDICINE

## 2018-10-29 PROCEDURE — 85610 PROTHROMBIN TIME: CPT

## 2018-10-29 PROCEDURE — 99221 1ST HOSP IP/OBS SF/LOW 40: CPT | Performed by: NURSE PRACTITIONER

## 2018-10-29 PROCEDURE — 2140000000 HC CCU INTERMEDIATE R&B

## 2018-10-29 PROCEDURE — 84100 ASSAY OF PHOSPHORUS: CPT

## 2018-10-29 PROCEDURE — G8982 BODY POS GOAL STATUS: HCPCS

## 2018-10-29 PROCEDURE — 84484 ASSAY OF TROPONIN QUANT: CPT

## 2018-10-29 PROCEDURE — 85025 COMPLETE CBC W/AUTO DIFF WBC: CPT

## 2018-10-29 PROCEDURE — 97162 PT EVAL MOD COMPLEX 30 MIN: CPT

## 2018-10-29 PROCEDURE — 99232 SBSQ HOSP IP/OBS MODERATE 35: CPT | Performed by: INTERNAL MEDICINE

## 2018-10-29 PROCEDURE — 83735 ASSAY OF MAGNESIUM: CPT

## 2018-10-29 RX ADMIN — Medication 10 ML: at 14:03

## 2018-10-29 RX ADMIN — METRONIDAZOLE 500 MG: 500 INJECTION, SOLUTION INTRAVENOUS at 01:21

## 2018-10-29 RX ADMIN — LINEZOLID 600 MG: 600 INJECTION, SOLUTION INTRAVENOUS at 06:23

## 2018-10-29 RX ADMIN — Medication 10 ML: at 21:27

## 2018-10-29 RX ADMIN — METOPROLOL SUCCINATE 25 MG: 25 TABLET, EXTENDED RELEASE ORAL at 14:04

## 2018-10-29 RX ADMIN — INSULIN LISPRO 1 UNITS: 100 INJECTION, SOLUTION INTRAVENOUS; SUBCUTANEOUS at 21:23

## 2018-10-29 RX ADMIN — PANTOPRAZOLE SODIUM 40 MG: 40 INJECTION, POWDER, FOR SOLUTION INTRAVENOUS at 14:03

## 2018-10-29 ASSESSMENT — ENCOUNTER SYMPTOMS
SHORTNESS OF BREATH: 1
WHEEZING: 0
EYE REDNESS: 0
EYE PAIN: 0
SORE THROAT: 0
ABDOMINAL PAIN: 0
EYES NEGATIVE: 1
ROS SKIN COMMENTS: LEFT KNEE SURGICAL INCISION
BACK PAIN: 0
VOMITING: 0
COUGH: 0
EYE DISCHARGE: 0
GASTROINTESTINAL NEGATIVE: 1
NAUSEA: 0
CONSTIPATION: 0
DIARRHEA: 0
BLOOD IN STOOL: 0

## 2018-10-29 ASSESSMENT — PAIN SCALES - GENERAL
PAINLEVEL_OUTOF10: 0

## 2018-10-29 NOTE — CONSULTS
Consultation     He is currently admitted with shock. He is known to our practice. He has a history of carotid artery stenosis and had a left CE in August.  He has done fine from this. He recently had knee procedure. It is thought he possibly got dehydrated some time after this and this caused injury to already compromised kidneys and liver and then he developed a fib with RVR and he was admitted. He has been dialyzing via a fem cath. We have been consulted to place a permacath for him.     Lab Results   Component Value Date    CREATININE 6.0 (H) 10/29/2018    BUN 63 (H) 10/29/2018     (L) 10/29/2018    K 3.7 10/29/2018    CL 92 (L) 10/29/2018    CO2 25 10/29/2018       Katia Lake is a 69 y.o. male with the following history reviewed and recorded in [facility]:  Patient Active Problem List    Diagnosis Date Noted    Cardiogenic shock ([address]      Priority: High    Shock liver     Septic shock ([address] 10/23/2018    Sepsis ([address] 10/23/2018    Acute liver failure without hepatic coma 10/23/2018    Acute kidney injury ([address] 10/23/2018    CHF (congestive heart failure) ([address] 10/23/2018    Gross hematuria 10/23/2018    Bilateral pleural effusion 10/23/2018    Acute respiratory failure with hypoxia (HCC) 10/23/2018    Elevated troponin 10/23/2018    Coagulopathy (HCC) 10/23/2018    Hyperkalemia 10/23/2018    Hypochloremia 88/50/2703    Metabolic acidosis 51/89/9213    Palliative care patient 10/23/2018    Arthritis of knee 10/15/2018    Essential hypertension 10/15/2018    Pure hypercholesterolemia 10/15/2018    Slow transit constipation 10/15/2018    Iron deficiency anemia 10/15/2018    CKD (chronic kidney disease), 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/2018    GERD (gastroesophageal reflux disease) 10/15/2018    Primary osteoarthritis of left knee 10/14/2018    Carotid stenosis, asymptomatic, left 08/28/2018    Bilateral carotid artery stenosis 06/25/2018    Atherosclerosis of native artery of both lower 2 times per day Uziel Villanueva PA-C   10 mL at 10/28/18 2105    sodium chloride flush 0.9 % injection 10 mL  10 mL Intravenous PRN Uziel Villanueva PA-C        metronidazole (FLAGYL) 500 mg in NaCl 100 mL IVPB premix  500 mg Intravenous Q8H Sonal Alcantar MD   Stopped at 10/29/18 0221    pantoprazole (PROTONIX) injection 40 mg  40 mg Intravenous Daily Thang Orantes, DO   40 mg at 10/28/18 4781    And    sodium chloride (PF) 0.9 % injection 10 mL  10 mL Intravenous Daily Thang Orantes, DO   10 mL at 10/28/18 2806     Allergies: Promethazine hcl  Past Medical History:   Diagnosis Date    Acute liver failure without hepatic coma 10/23/2018    Back pain     \"with tired legs as a result\"    Blood circulation, collateral     Carotid arterial disease ([facility]     recent surgery    CHF (congestive heart failure) ([facility] 10/23/2018    CKD (chronic kidney disease), 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/2018    GERD (gastroesophageal reflux disease)     Hyperlipidemia     Hypertension     Hypertension     Palliative care patient 10/23/2018    Primary osteoarthritis of left knee 10/14/2018    PVD (peripheral vascular disease) [facility])      Past Surgical History:   Procedure Laterality Date    BACK SURGERY      COLONOSCOPY  2007?  UT COLONOSCOPY FLX DX W/COLLJ SPEC WHEN PFRMD N/A 9/11/2017    Dr Napolean Sandifer internal hemorrhoids, diverticular disease-HP-No recall (age)   Sanya Thompson UT REVISE MEDIAN N/CARPAL TUNNEL SURG Left 7/18/2018    OPEN CARPAL TUNNEL RELEASE performed by Verenice Kauffman MD at [address] [facility] Left 8/28/2018    LEFT CAROTID ENDARTERECTOMY WITH VEIN PATCH ANGIOPLASTY AND COMPLETION ANGIOGRAM performed by Naomie Boas, MD at [place] [address] Left 10/15/2018    LEFT COMPLEX TOTAL KNEE ARTHROPLASTY performed by Verenice Kauffman MD at [facility] VASCULAR SURGERY  04/21/2015    Joceline BESS Ultrasound guided access of left common femoral artery. Aortogram.Diagnostic right lower extremity arteriogram.Radiologic supervision and interpretation.  VASCULAR SURGERY  01/13/2015    Mary Lou Floyd M.D Atherectomy,angioplasty,and stenting of left superficial femoral artery.  VASCULAR SURGERY  03/11/2014    Mary Lou Floyd M.D. Ultrasound-guided access of right common femoral artery. Aortogram.Left lower extremity arteriogram.Atherectomy and angioplasty of left superficial femoral artery. Radiologic supervision and interpretation.  VASCULAR SURGERY  01/18/2013    Mary Lou BESS Aortogram.Multistation arteriogram right lower extremity. Laser atherectomy and angioplasty of right superficial femoral artery. Selective catheterization of right tibioperoneal trunk. Angioplasty of peroneal artery and tibioperoneal trunk. Family History   Problem Relation Age of Onset    Colon Cancer Father     Diabetes Brother     Colon Polyps Neg Hx     Liver Cancer Neg Hx     Liver Disease Neg Hx     Esophageal Cancer Neg Hx     Rectal Cancer Neg Hx     Stomach Cancer Neg Hx      Social History   Substance Use Topics    Smoking status: Former Smoker     Quit date: 6/3/2003    Smokeless tobacco: Never Used    Alcohol use 7.2 oz/week     12 Glasses of wine per week      Comment: 2 glasses of wine every night       Old records have been obtained from the referring provider. These records have been reviewed and summarized. Review of Systems    Constitutional -has had weakness and fatique  HENT - no significant rhinorrhea or epistaxis. No tinnitus or significant hearing loss. Eyes - no sudden vision change or amaurosis. Respiratory - has had some shortness of breath, no wheezing, or stridor. No apnea, cough, or chest tightness associated with shortness of breath. Cardiovascular - no chest pain, syncope, or significant dizziness. Gastrointestinal - no abdominal swelling or pain. No blood in stool.   No severe constipation, diarrhea, nausea, or

## 2018-10-29 NOTE — PROGRESS NOTES
place, and time. He appears well-developed and well-nourished. He appears ill. No distress. HENT:   Head: Normocephalic and atraumatic. Mouth/Throat: Oropharynx is clear and moist.   Eyes: Conjunctivae are normal. Right eye exhibits no discharge. Left eye exhibits no discharge. No scleral icterus. Neck: Normal range of motion. Neck supple. No JVD present. No tracheal deviation present. Cardiovascular: Normal rate, regular rhythm, normal heart sounds and intact distal pulses. Exam reveals no gallop and no friction rub. No murmur heard. Pulmonary/Chest: Accessory muscle usage present. No respiratory distress. He has decreased breath sounds in the right lower field and the left lower field. He has no wheezes. He has no rales. He exhibits no tenderness. Abdominal: Soft. Bowel sounds are normal. He exhibits no distension and no mass. There is no tenderness. There is no rebound and no guarding. No hernia. Musculoskeletal: He exhibits no edema, tenderness or deformity. Lymphadenopathy:     He has no cervical adenopathy. Neurological: He is alert and oriented to person, place, and time. No cranial nerve deficit. Coordination normal.   Skin: Skin is warm. No rash noted. He is not diaphoretic. No erythema. No pallor. Left knee surgical incision   Psychiatric: He has a normal mood and affect. His behavior is normal. Thought content normal.   Nursing note and vitals reviewed.       BMP:   Recent Labs      10/28/18   0738  10/29/18   0215   NA  136  133*   K  3.7  3.7   CL  94*  92*   CO2  28  25   BUN  56*  63*   CREATININE  5.2*  6.0*   GLUCOSE  165*  175*   CALCIUM  8.1*  8.1*     CBC:   Recent Labs      10/28/18   0300  10/29/18   0215   WBC  9.1  7.8   HGB  8.1*  8.3*   HCT  25.6*  25.6*   MCV  89.5  88.3   PLT  93*  98*     CMP:    Recent Labs      10/28/18   0300  10/28/18   0738  10/29/18   0215   NA  138  136  133*   K  3.7  3.7  3.7   CL  96*  94*  92*   CO2  29  28  25   BUN  54*  56*  63* 600 mg Intravenous Q12H Sera Rodriguez  mL/hr at 10/29/18 0623 600 mg at 10/29/18 0623    glucose (GLUTOSE) 40 % oral gel 15 g  15 g Oral PRN Papo Davis MD        dextrose 50 % solution 12.5 g  12.5 g Intravenous PRN Papo Davis MD        glucagon (rDNA) injection 1 mg  1 mg Intramuscular PRN Papo Davis MD        dextrose 50 % solution 12.5 g  12.5 g Intravenous PRN Ally Nowak MD        sodium chloride flush 0.9 % injection 10 mL  10 mL Intravenous 2 times per day Lan Kruger PA-C   10 mL at 10/28/18 2105    sodium chloride flush 0.9 % injection 10 mL  10 mL Intravenous PRN Lan Kruger PA-C        metronidazole (FLAGYL) 500 mg in NaCl 100 mL IVPB premix  500 mg Intravenous Q8H Papo Davis MD   Stopped at 10/29/18 0221    pantoprazole (PROTONIX) injection 40 mg  40 mg Intravenous Daily [place]land Orantes, DO   40 mg at 10/28/18 8195    And    sodium chloride (PF) 0.9 % injection 10 mL  10 mL Intravenous Daily [place] Orantes, DO   10 mL at 10/28/18 0841       Allergies  Promethazine hcl     ASSESSMENT/PLAN:  Coagulopathy secondary to acute liver failure and Eliquis in the context of acute kidney injury. · Last dose of Eliquis taken 10/22/2018 ( p.m. Dose)  · Admitting INR of 2.82, PT 29.8, and PTT of 38.5  · INR 1.65  · PT 19.5  · PTT 32.7  · Fibrinogen 364 on 10/24/2018     Kcentra 5000 units ( 500 units/kg) ×1 on 10/24/2018  Vitamin K 5 mg PO ×1 on 10/24/2018    Do not recommend Lovenox due to acute kidney injury. Concern with heparin using heparin is risk for DIC. Recommend SCDs at this time for DVT prophylaxis. Thrombocytopenia- suspect consumption secondary to acute illness  · Platelet count 96,163      Normocytic anemia with recent hematuria  · Hemoglobin 8.3- s/p 1 unit of pRBCs on 10/24/2018 and 10/26/2018.   · MCV 88.3    Iron 15% on 10/16/2018  Iron 37 on 10/16/2018  TIBC 244 on 10/16/2018  Vitamin B12 337 on 10/16/2018  Ferritin - >2000 om

## 2018-10-29 NOTE — PROGRESS NOTES
FOLLOW UP: Yes  Activity Tolerance  Activity Tolerance: Patient Tolerated treatment well         Plan   Plan  Times per week: PT AT LEAST ONCE DAILY X 14 DAYS  Current Treatment Recommendations: Strengthening, ROM, Functional Mobility Training, Transfer Training, Gait Training, Pain Management, Positioning, Safety Education & Training, Patient/Caregiver Education & Training  Plan Comment: MAY NEED CHAIR FOLLOW INITIALLY  Safety Devices  Type of devices: Call light within reach, Gait belt, Left in bed, Bed alarm in place    G-Code  PT G-Codes  Functional Assessment Tool Used: SIT>STAND  Score: CK, MIN  Functional Limitation: Changing and maintaining body position  Changing and Maintaining Body Position Current Status (): At least 40 percent but less than 60 percent impaired, limited or restricted  Changing and Maintaining Body Position Goal Status ():  At least 20 percent but less than 40 percent impaired, limited or restricted  OutComes Score                                           AM-PAC Score             Goals  Short term goals  Time Frame for Short term goals: 14 DAYS BEFORE RE EVAL  Short term goal 1: SUP<>SIT, SBA  Short term goal 2: SIT<>STAND, CGA  Short term goal 3:  FT WITH RW AND CGA       Therapy Time   Individual Concurrent Group Co-treatment   Time In           Time Out           Minutes                   Shekhar Jorgensen PT    Electronically signed by Shekhar Jorgensen PT on 10/29/2018 at 3:54 PM

## 2018-10-29 NOTE — PROGRESS NOTES
inferior pole the right kidney, the largest measures 2.8 cm. This appears stable and benign. 3. The bladder is decompressed by a Shultz catheter. Signed by Dr Alexis Barrera on 10/23/2018 5:57 PM    Us Renal Complete    Result Date: 10/17/2018  Examination. US RENAL COMPLETE History: Acute renal failure. The ultrasound examination of the kidneys bilaterally is performed. The comparison is made with the previous study dated 6/5/2017. The right kidney measures 11.4 x 5.3 x 5.3 cm. There is an exophytic echolucent nodule in the lower pole measuring 2.7 x 1.9 x 2.2 cm. There is no hydronephrosis. There is normal corticomedullary differentiation. The cortex measures 1 cm in thickness. Left kidney measures 11 cm x 5.1 x 4.5 cm. No intrinsic abnormality or hydronephrosis. Normal corticomedullary differentiation. The renal cortex measures 1.1 cm in thickness. The urinary bladder is poorly distended and may not be optimally evaluated. A right renal cyst. No hydronephrosis on either side. Signed by Dr Luiz Kohli on 10/17/2018 11:56 AM    Ct Abdomen Pelvis W Iv Contrast Additional Contrast? None    Result Date: 10/23/2018  Examination. CT ABDOMEN PELVIS W IV CONTRAST History: Abnormal liver function tests. DLP: 164 8mGycm. The CT scan of the abdomen and pelvis is performed without oral or intravenous contrast enhancement. The images are acquired in axial plane with subsequent reconstruction in coronal and sagittal planes. The comparison is made with the previous study dated 8/7/2017. There are extensive artifacts which limit the diagnostic yield of the study. The lung bases included in the study show small bibasilar pleural effusion, more on the right side and a mild by basilar dependent atelectasis and compression atelectasis. Atheromatous changes of the thoracic aorta and coronary arteries are seen. The visualized liver and spleen appear unremarkable.  There is moderate diffuse thickening of the gallbladder wall with small amount of fluid in the gallbladder fossa. No radiopaque calculi are seen. The common bile duct is not dilated. The pancreas appear normal. The pancreatic duct is not dilated. The adrenal glands bilaterally appear normal. The kidneys are unremarkable. There is bilateral perirenal fat infiltration. No evidence of hydronephrosis. The ureters are not optimally visualized. The urinary bladder is decompressed. There is an apparent soft tissue density located posteriorly at the base of the urinary bladder which may be extrinsic pressure of the enlarged prostate? Santhosh Saenz This may be further evaluated. The prostate is moderately enlarged. There are small fat-containing inguinal hernias bilaterally. The stomach appears normal. Normal small bowel is seen. Appendix is not visualized. There are no finding to suggest appendicitis. Moderate gas and stool are seen in the colon. There are severe atheromatous changes of the abdominal aorta and iliac arteries. No aneurysmal dilatation or dissection. There is a mild retroperitoneal para-aortic lymphadenopathy. There is a small amount of ascites particularly in the right upper abdomen in the paracolic gutter. Severe chronic degenerative changes of the lumbar spine are seen with a mild levoscoliosis. A small amount of abdominal ascites. Moderate thickening of the wall of the gallbladder with a fluid in the gallbladder fossa may represent acute cholecystitis. No gallstones are noted. This may be clinically correlated and further evaluated. The urinary bladder is decompressed. There is an apparent soft tissue density located posteriorly at the floor of the urinary bladder which may be extrinsic pressure from the enlarged prostate. This may be clinically correlated. A small bibasilar pleural effusion, more on the right side. The severe atheromatous changes of the abdominal aorta and iliac arteries. The above study was initially reviewed and reported by [facility].  I do not find any slightly greater on the right than the left. Bibasilar atelectasis is also noted. The trachea and bronchial tree are patent. Heart: The heart is normal in size. There is no pericardial effusion. Lymph nodes: No pathologically enlarged mediastinal, hilar, or axillary lymph nodes are present. Bones and soft tissues: The osseous structures of the thorax and surrounding soft tissues demonstrate no acute process. Upper abdomen: The imaged portion of the upper abdomen demonstrates no acute process. 1. No evidence of embolic disease. 2. Bilateral pleural effusions are noted. 3. Bibasilar dependent atelectasis.  Signed by Dr Rodríguez Teran on 10/23/2018 7:39 AM       Assessment   ANA / ATN  Hyperkalemia  Metabolic acidosis  UTI with septic shock  Shock liver  Anemia  Hyponatremia  Elevated troponin    Plan:  permcath in AM  HD MWF prn  D/w pt/vascular/ROSA Dinero MD  10/29/18  3:20 PM

## 2018-10-29 NOTE — PLAN OF CARE
Problem: Nutrition  Goal: Optimal nutrition therapy  Nutrition Problem: Inadequate oral intake  Intervention: Food and/or Nutrient Delivery: Start oral diet  Nutritional Goals: meet nutritional needs through po intake or alternate source of nutrition    Outcome: Ongoing

## 2018-10-29 NOTE — PROGRESS NOTES
Subjective:  Mr. Alexia Cherry reports that he is feeling some better. He is tolerating a diet, although he states his appetite is not quite there. He denies nausea, is passing flatus, and has had BMs. He still denies any abdominal pain. Objective:  BP (!) 96/58   Pulse 76   Temp 98 °F (36.7 °C) (Temporal)   Resp 18   Ht 6' (1.829 m)   Wt 273 lb (123.8 kg)   SpO2 96%   BMI 37.03 kg/m²   General: NAD, Awake and alert  Chest: regular, non-labored  Abdomen: Soft, obese, NT, ND    CBC:   Lab Results   Component Value Date    WBC 7.8 10/29/2018    RBC 2.90 10/29/2018    HGB 8.3 10/29/2018    HCT 25.6 10/29/2018    MCV 88.3 10/29/2018    MCH 28.6 10/29/2018    MCHC 32.4 10/29/2018    RDW 15.8 10/29/2018    PLT 98 10/29/2018    MPV 11.3 10/29/2018     CMP:    Lab Results   Component Value Date     10/29/2018    K 3.7 10/29/2018    CL 92 10/29/2018    CO2 25 10/29/2018    BUN 63 10/29/2018    CREATININE 6.0 10/29/2018    LABGLOM 9 10/29/2018    GLUCOSE 175 10/29/2018    PROT 4.7 10/29/2018    LABALBU 2.7 10/29/2018    CALCIUM 8.1 10/29/2018    BILITOT 9.1 10/29/2018    ALKPHOS 190 10/29/2018     10/29/2018    ALT 1,136 10/29/2018     Assessment and plan:  69year old male with shock  1) some gallbladder wall thickening and fluid- likely reactive from liver injury. Liver enzymes trending down, bilirubin up some today, continue to monitor per GI  2) ARF- continue per nephrology  3) Other cares per the medicine teams. Will follow peripherally.

## 2018-10-30 ENCOUNTER — APPOINTMENT (OUTPATIENT)
Dept: INTERVENTIONAL RADIOLOGY/VASCULAR | Age: 78
DRG: 871 | End: 2018-10-30
Payer: MEDICARE

## 2018-10-30 LAB
ALBUMIN SERPL-MCNC: 2.6 G/DL (ref 3.5–5.2)
ALP BLD-CCNC: 205 U/L (ref 40–130)
ALT SERPL-CCNC: 772 U/L (ref 5–41)
ANION GAP SERPL CALCULATED.3IONS-SCNC: 14 MMOL/L (ref 7–19)
APTT: 32.3 SEC (ref 26–36.2)
AST SERPL-CCNC: 135 U/L (ref 5–40)
BASOPHILS ABSOLUTE: 0 K/UL (ref 0–0.2)
BASOPHILS RELATIVE PERCENT: 0.3 % (ref 0–1)
BILIRUB SERPL-MCNC: 6.7 MG/DL (ref 0.2–1.2)
BUN BLDV-MCNC: 52 MG/DL (ref 8–23)
CALCIUM SERPL-MCNC: 8.1 MG/DL (ref 8.8–10.2)
CHLORIDE BLD-SCNC: 98 MMOL/L (ref 98–111)
CO2: 25 MMOL/L (ref 22–29)
CREAT SERPL-MCNC: 4.8 MG/DL (ref 0.5–1.2)
EOSINOPHILS ABSOLUTE: 0 K/UL (ref 0–0.6)
EOSINOPHILS RELATIVE PERCENT: 0 % (ref 0–5)
GFR NON-AFRICAN AMERICAN: 12
GLUCOSE BLD-MCNC: 144 MG/DL (ref 70–99)
GLUCOSE BLD-MCNC: 148 MG/DL (ref 74–109)
GLUCOSE BLD-MCNC: 166 MG/DL (ref 70–99)
GLUCOSE BLD-MCNC: 187 MG/DL (ref 70–99)
GLUCOSE BLD-MCNC: 216 MG/DL (ref 70–99)
HCT VFR BLD CALC: 26 % (ref 42–52)
HEMOGLOBIN: 8.4 G/DL (ref 14–18)
INR BLD: 1.51 (ref 0.88–1.18)
LYMPHOCYTES ABSOLUTE: 0.9 K/UL (ref 1.1–4.5)
LYMPHOCYTES RELATIVE PERCENT: 11.4 % (ref 20–40)
MAGNESIUM: 1.8 MG/DL (ref 1.6–2.4)
MCH RBC QN AUTO: 28.9 PG (ref 27–31)
MCHC RBC AUTO-ENTMCNC: 32.3 G/DL (ref 33–37)
MCV RBC AUTO: 89.3 FL (ref 80–94)
MONOCYTES ABSOLUTE: 0.9 K/UL (ref 0–0.9)
MONOCYTES RELATIVE PERCENT: 11.9 % (ref 0–10)
NEUTROPHILS ABSOLUTE: 5.5 K/UL (ref 1.5–7.5)
NEUTROPHILS RELATIVE PERCENT: 73.3 % (ref 50–65)
PDW BLD-RTO: 16 % (ref 11.5–14.5)
PERFORMED ON: ABNORMAL
PHOSPHORUS: 4.2 MG/DL (ref 2.5–4.5)
PLATELET # BLD: 104 K/UL (ref 130–400)
PMV BLD AUTO: 11.1 FL (ref 9.4–12.4)
POTASSIUM REFLEX MAGNESIUM: 4 MMOL/L (ref 3.5–5)
PROTHROMBIN TIME: 18.2 SEC (ref 12–14.6)
RBC # BLD: 2.91 M/UL (ref 4.7–6.1)
SODIUM BLD-SCNC: 137 MMOL/L (ref 136–145)
TOTAL PROTEIN: 4.7 G/DL (ref 6.6–8.7)
TROPONIN: 1.82 NG/ML (ref 0–0.03)
WBC # BLD: 7.5 K/UL (ref 4.8–10.8)

## 2018-10-30 PROCEDURE — 6360000002 HC RX W HCPCS: Performed by: SURGERY

## 2018-10-30 PROCEDURE — 84100 ASSAY OF PHOSPHORUS: CPT

## 2018-10-30 PROCEDURE — 85730 THROMBOPLASTIN TIME PARTIAL: CPT

## 2018-10-30 PROCEDURE — 36558 INSERT TUNNELED CV CATH: CPT | Performed by: SURGERY

## 2018-10-30 PROCEDURE — 85610 PROTHROMBIN TIME: CPT

## 2018-10-30 PROCEDURE — 99231 SBSQ HOSP IP/OBS SF/LOW 25: CPT | Performed by: INTERNAL MEDICINE

## 2018-10-30 PROCEDURE — 6370000000 HC RX 637 (ALT 250 FOR IP): Performed by: INTERNAL MEDICINE

## 2018-10-30 PROCEDURE — 84484 ASSAY OF TROPONIN QUANT: CPT

## 2018-10-30 PROCEDURE — 76937 US GUIDE VASCULAR ACCESS: CPT | Performed by: SURGERY

## 2018-10-30 PROCEDURE — C1769 GUIDE WIRE: HCPCS

## 2018-10-30 PROCEDURE — 2580000003 HC RX 258: Performed by: PHYSICIAN ASSISTANT

## 2018-10-30 PROCEDURE — 77001 FLUOROGUIDE FOR VEIN DEVICE: CPT | Performed by: SURGERY

## 2018-10-30 PROCEDURE — 2140000000 HC CCU INTERMEDIATE R&B

## 2018-10-30 PROCEDURE — 2580000003 HC RX 258: Performed by: FAMILY MEDICINE

## 2018-10-30 PROCEDURE — 97116 GAIT TRAINING THERAPY: CPT

## 2018-10-30 PROCEDURE — 99152 MOD SED SAME PHYS/QHP 5/>YRS: CPT | Performed by: SURGERY

## 2018-10-30 PROCEDURE — 02HV33Z INSERTION OF INFUSION DEVICE INTO SUPERIOR VENA CAVA, PERCUTANEOUS APPROACH: ICD-10-PCS | Performed by: SURGERY

## 2018-10-30 PROCEDURE — 6360000002 HC RX W HCPCS: Performed by: FAMILY MEDICINE

## 2018-10-30 PROCEDURE — 0JH63XZ INSERTION OF TUNNELED VASCULAR ACCESS DEVICE INTO CHEST SUBCUTANEOUS TISSUE AND FASCIA, PERCUTANEOUS APPROACH: ICD-10-PCS | Performed by: SURGERY

## 2018-10-30 PROCEDURE — 80053 COMPREHEN METABOLIC PANEL: CPT

## 2018-10-30 PROCEDURE — 85025 COMPLETE CBC W/AUTO DIFF WBC: CPT

## 2018-10-30 PROCEDURE — 51702 INSERT TEMP BLADDER CATH: CPT

## 2018-10-30 PROCEDURE — 99153 MOD SED SAME PHYS/QHP EA: CPT | Performed by: SURGERY

## 2018-10-30 PROCEDURE — 2500000003 HC RX 250 WO HCPCS: Performed by: SURGERY

## 2018-10-30 PROCEDURE — 83735 ASSAY OF MAGNESIUM: CPT

## 2018-10-30 PROCEDURE — 82948 REAGENT STRIP/BLOOD GLUCOSE: CPT

## 2018-10-30 PROCEDURE — C9113 INJ PANTOPRAZOLE SODIUM, VIA: HCPCS | Performed by: FAMILY MEDICINE

## 2018-10-30 RX ORDER — HEPARIN SODIUM 5000 [USP'U]/ML
INJECTION, SOLUTION INTRAVENOUS; SUBCUTANEOUS
Status: COMPLETED | OUTPATIENT
Start: 2018-10-30 | End: 2018-10-30

## 2018-10-30 RX ORDER — FENTANYL CITRATE 50 UG/ML
INJECTION, SOLUTION INTRAMUSCULAR; INTRAVENOUS
Status: COMPLETED | OUTPATIENT
Start: 2018-10-30 | End: 2018-10-30

## 2018-10-30 RX ORDER — LIDOCAINE HYDROCHLORIDE 20 MG/ML
INJECTION, SOLUTION INFILTRATION; PERINEURAL
Status: COMPLETED | OUTPATIENT
Start: 2018-10-30 | End: 2018-10-30

## 2018-10-30 RX ORDER — MIDAZOLAM HYDROCHLORIDE 1 MG/ML
INJECTION INTRAMUSCULAR; INTRAVENOUS
Status: COMPLETED | OUTPATIENT
Start: 2018-10-30 | End: 2018-10-30

## 2018-10-30 RX ORDER — VANCOMYCIN HYDROCHLORIDE 1 G/200ML
1000 INJECTION, SOLUTION INTRAVENOUS ONCE
Status: COMPLETED | OUTPATIENT
Start: 2018-10-30 | End: 2018-10-30

## 2018-10-30 RX ADMIN — METOPROLOL SUCCINATE 25 MG: 25 TABLET, EXTENDED RELEASE ORAL at 08:05

## 2018-10-30 RX ADMIN — Medication 10 ML: at 21:59

## 2018-10-30 RX ADMIN — Medication 10 ML: at 08:06

## 2018-10-30 RX ADMIN — PANTOPRAZOLE SODIUM 40 MG: 40 INJECTION, POWDER, FOR SOLUTION INTRAVENOUS at 08:05

## 2018-10-30 RX ADMIN — FENTANYL CITRATE 25 MCG: 50 INJECTION, SOLUTION INTRAMUSCULAR; INTRAVENOUS at 08:53

## 2018-10-30 RX ADMIN — MIDAZOLAM HYDROCHLORIDE 0.5 MG: 1 INJECTION, SOLUTION INTRAMUSCULAR; INTRAVENOUS at 08:53

## 2018-10-30 RX ADMIN — HEPARIN SODIUM 5000 UNITS: 5000 INJECTION, SOLUTION INTRAVENOUS; SUBCUTANEOUS at 08:41

## 2018-10-30 RX ADMIN — INSULIN LISPRO 1 UNITS: 100 INJECTION, SOLUTION INTRAVENOUS; SUBCUTANEOUS at 21:58

## 2018-10-30 RX ADMIN — LIDOCAINE HYDROCHLORIDE 25 ML: 20 INJECTION, SOLUTION INFILTRATION; PERINEURAL at 08:55

## 2018-10-30 RX ADMIN — VANCOMYCIN HYDROCHLORIDE 1000 MG: 1 INJECTION, SOLUTION INTRAVENOUS at 08:19

## 2018-10-30 RX ADMIN — INSULIN LISPRO 1 UNITS: 100 INJECTION, SOLUTION INTRAVENOUS; SUBCUTANEOUS at 13:13

## 2018-10-30 RX ADMIN — INSULIN LISPRO 1 UNITS: 100 INJECTION, SOLUTION INTRAVENOUS; SUBCUTANEOUS at 17:55

## 2018-10-30 ASSESSMENT — PAIN SCALES - GENERAL
PAINLEVEL_OUTOF10: 0

## 2018-10-30 NOTE — PROGRESS NOTES
Nephrology ([facility]) Progress Note    Patient:  Agatha Muller  YOB: 1940  Date of Service: 10/30/2018  MRN: [id number]   Acct: [de-identified]   Primary Care Physician: Tiara Moore MD  Advance Directive: Full Code  Admit Date: 10/23/2018       Hospital Day: 7  Referring Provider: Blaire Goncalves MD    Patient independently seen and examined, Chart, Consults, Notes, Operative notes, Labs, Cardiology, and Radiology studies reviewed as able. Subjective:  Agatha Muller is a 69 y.o. male  whom we were consulted for acute kidney injury/metabolic acidosis/hyperkalemia. Patient has history of chronic kidney disease and sees Dr. Patricia Salazar in the office. He presented with severe weakness, lack of energy and encephalopathy. On presentation, his blood pressure was very low. Then diagnosed with septic shock secondary to gallbladder and urinary tract infection. Patient was also given intravenous contrast twice in the ER 1st for CT angiogram of the pulmonary arteries followed by CT scan of the abdomen and pelvis. Hospital course remarkable for IV fluid resuscitation, initially requiring 3 pressors, IV antibiotics. His renal function continued to deteriorate. On October 24, he underwent right femoral hemodialysis catheter insertion followed by several hemodialysis treatments. Patient remained in CCU and all the vasopressor has been off. He has good hemodynamics.        Today, no issues with dialysis. Questions about course/ckd. No n/v.   Tolerated permcath             Allergies:  Promethazine hcl    Medicines:  Current Facility-Administered Medications   Medication Dose Route Frequency Provider Last Rate Last Dose    polyethylene glycol (GLYCOLAX) packet 17 g  17 g Oral Daily Kristin Houser MD   17 g at 10/28/18 1312    polyethylene glycol (GLYCOLAX) packet 17 g  17 g Oral 3 times per day on Karina Graves MD   17 g at 10/28/18 2055    metoprolol succinate (TOPROL XL) extended release tablet 25 mg  25 mg Oral Daily Jorge Garner MD   25 mg at 10/30/18 0805    sodium chloride flush 0.9 % injection 10 mL  10 mL Intravenous PRN Remington Leija MD        insulin lispro (HUMALOG) injection vial 0-6 Units  0-6 Units Subcutaneous TID WC Mee France MD        insulin lispro (HUMALOG) injection vial 0-3 Units  0-3 Units Subcutaneous Nightly Mee France MD   1 Units at 10/29/18 2123    glucose (GLUTOSE) 40 % oral gel 15 g  15 g Oral PRN Mee France MD        dextrose 50 % solution 12.5 g  12.5 g Intravenous PRN Mee France MD        glucagon (rDNA) injection 1 mg  1 mg Intramuscular PRN Mee France MD        dextrose 50 % solution 12.5 g  12.5 g Intravenous PRN Joline Sacks, MD        sodium chloride flush 0.9 % injection 10 mL  10 mL Intravenous 2 times per day Henrry Medina PA-C   10 mL at 10/29/18 2127    sodium chloride flush 0.9 % injection 10 mL  10 mL Intravenous PRN Henrry Medina PA-C        pantoprazole (PROTONIX) injection 40 mg  40 mg Intravenous Daily Thang Orantes,    40 mg at 10/30/18 8444    And    sodium chloride (PF) 0.9 % injection 10 mL  10 mL Intravenous Daily Dex Flores DO   10 mL at 10/30/18 9252       Past Medical History:  Past Medical History:   Diagnosis Date    Acute liver failure without hepatic coma 10/23/2018    Back pain     \"with tired legs as a result\"    Blood circulation, collateral     Carotid arterial disease ([address]     recent surgery    CHF (congestive heart failure) ([address] 10/23/2018    CKD (chronic kidney disease), 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/2018    GERD (gastroesophageal reflux disease)     Hyperlipidemia     Hypertension     Hypertension     Palliative care patient 10/23/2018    Primary osteoarthritis of left knee 10/14/2018    PVD (peripheral vascular disease) [facility])        Past Surgical History:  Past Surgical History:   Procedure Laterality Date    BACK SURGERY      COLONOSCOPY evidence of a knee arthroplasty. There is normal alignment of the hardware components. No complication. There is soft tissue air around the knee. No acute bony abnormality. A superficial femoral and popliteal artery graft is seen in place. Severe atheromatous changes of the distal popliteal and tibial arteries are seen. A normal left knee arthroplasty. Signed by Dr Leisa Hardwick on 10/15/2018 1:19 PM    Us Renal Complete    Result Date: 10/23/2018  EXAMINATION: US RENAL COMPLETE 10/23/2018 5:53 PM HISTORY: Acute renal failure. Gross hematuria. Report: Sonographic images of the kidneys were obtained, comparison is made with the previous ultrasound 10/17/2018. The right kidney measures 12.0 x 4.9 x 4.7 cm, at the inferior pole, there is a benign-appearing cyst that measures 2.8 x 2.2 x 2.0 cm. This appears stable. Also at the inferior pole on the right there is a small hypoechoic nodule, probable cyst that measures 9 mm, projecting from the inferior pole cortex. This is not clearly seen on the previous ultrasound. Color flow images demonstrate vascular flow within the right kidney. Renal cortical echogenicity is within normal limits. There is no hydronephrosis on the right. The left kidney measures 10.0 x 5.4 x 4.4 cm and has normal cortical echogenicity, mild cortical thinning is present. No mass or hydronephrosis is identified. Color flow imaging demonstrates vascular flow within the left kidney. The bladder is decompressed by a Shultz catheter. 1. Mild atrophy of the left kidney with mild cortical thinning. No renal mass or hydronephrosis. 2. 2 cysts are identified at the inferior pole the right kidney, the largest measures 2.8 cm. This appears stable and benign. 3. The bladder is decompressed by a Shultz catheter. Signed by Dr Saundra Barrera on 10/23/2018 5:57 PM    Us Renal Complete    Result Date: 10/17/2018  Examination. US RENAL COMPLETE History: Acute renal failure.  The ultrasound examination of the 10/23/2018  EXAMINATION: XR CHEST PORTABLE 10/23/2018 7:17 AM HISTORY: Irregular heart rate COMPARISON: 8/20/2018 FINDINGS: The heart is magnified but felt to be normal in size. The aorta is tortuous with atherosclerotic calcifications. The pulmonary vasculature is indistinct, and there are subtle perihilar opacities bilaterally. Opacities extend into the right lung base. A layering right pleural effusion is suspected. There is no appreciable pneumothorax. Suspect pulmonary vascular congestion with right greater than left basilar atelectasis. Layering right pleural effusion suspected. Signed by Dr Paty Coreas on 10/23/2018 7:19 AM    Cta Pulmonary W Contrast    Result Date: 10/23/2018  CTA PULMONARY W CONTRAST 10/23/2018 4:00 AM HISTORY: COMPARISON: None. DLP: 863 mGy cm TECHNIQUE: Helical tomographic images of the chest were obtained after the administration of intravenous contrast following angiogram protocol. Additionally, 3D MIP reconstructions in the coronal and sagittal planes were provided. FINDINGS:  Pulmonary arteries: There is adequate enhancement of the pulmonary arteries to evaluate for central and segmental pulmonary emboli. There are no filling defects within the main, lobar, segmental or visualized subsegmental pulmonary arteries. . Aorta and great vessels: There is atherosclerosis in the aorta. There is atherosclerosis in the great vessels without evidence of stenosis. Coronary artery calcifications are visualized. Neck base: The imaged portion of the base of the neck appears unremarkable. Lungs: The lungs are clear. Bilateral pleural effusions are present slightly greater on the right than the left. Bibasilar atelectasis is also noted. The trachea and bronchial tree are patent. Heart: The heart is normal in size. There is no pericardial effusion. Lymph nodes: No pathologically enlarged mediastinal, hilar, or axillary lymph nodes are present. Bones and soft tissues:  The osseous structures of

## 2018-10-30 NOTE — PROGRESS NOTES
soft, non-tender; bowel sounds normal; no masses,  no organomegaly  Extremities: extremities normal, atraumatic, no cyanosis or edema  Neurologic: No focal neurologic deficits, normal sensation, alert and oriented, improved affect and mood appropriate. Skin: jaundice. no rashes, nodules. Assessment and Plan:   Principal Problem:    Septic shock ([facility] [address]  Active Problems:    Sepsis ([address]    Acute liver failure without hepatic coma    Acute kidney injury ([address]    CHF (congestive heart failure) (HCC)    Gross hematuria    Bilateral pleural effusion    Acute respiratory failure with hypoxia (HCC)    Elevated troponin    Coagulopathy (HCC)    Hyperkalemia    Hypochloremia    Metabolic acidosis    Cardiogenic shock ([address]    Palliative care patient    Shock liver    Acute renal failure ([facility] [address]  Resolved Problems:    * No resolved hospital problems. *    Antibiotics discontinued, continue to monitor for signs/symptoms of active infection     GI following, LFTs trending down. Surgery following, appreciate recs. No acute intervention planned      BB added per cardiology, continue to monitor on tele    Continue insulin SS and monitor requirements    S/p 1 unit PRBC 10/24, 10/26, 10/27. willcontinue to monitor H&H stable X 24 hours, hematology following. Will continue to hold chemical VTE ppx at this time    Nephro following, patient started HD 10/24, appreciate recs. permacath 10/30    Appreciate multidisciplinary support in this critically ill patient. Continue PT/OT. Discussed with case management about d/c planning as patient continues to stabilize. Patient will need outpatient dialysis initiated and acute vs subacute rehab.     Advance Directive: Full Code    DVT prophylaxis: scd    Discharge planning: TBD      Radha Spring MD  [facility]

## 2018-10-30 NOTE — PLAN OF CARE
Problem: Falls - Risk of:  Goal: Will remain free from falls  Will remain free from falls   Outcome: Ongoing      Problem: Bleeding:  Goal: Will show no signs and symptoms of excessive bleeding  Will show no signs and symptoms of excessive bleeding   Outcome: Ongoing

## 2018-10-30 NOTE — PROGRESS NOTES
Pt received medication and is resting. No complaints at this time. Will continue to monitor.  Electronically signed by Blanco Hatch RN on 10/30/2018 at 12:17 AM

## 2018-10-31 LAB
ALBUMIN SERPL-MCNC: 2.7 G/DL (ref 3.5–5.2)
ALP BLD-CCNC: 225 U/L (ref 40–130)
ALT SERPL-CCNC: 584 U/L (ref 5–41)
ANION GAP SERPL CALCULATED.3IONS-SCNC: 14 MMOL/L (ref 7–19)
APTT: 32.4 SEC (ref 26–36.2)
AST SERPL-CCNC: 99 U/L (ref 5–40)
BASOPHILS ABSOLUTE: 0 K/UL (ref 0–0.2)
BASOPHILS RELATIVE PERCENT: 0.3 % (ref 0–1)
BILIRUB SERPL-MCNC: 5.3 MG/DL (ref 0.2–1.2)
BUN BLDV-MCNC: 67 MG/DL (ref 8–23)
CALCIUM SERPL-MCNC: 8.3 MG/DL (ref 8.8–10.2)
CHLORIDE BLD-SCNC: 96 MMOL/L (ref 98–111)
CO2: 24 MMOL/L (ref 22–29)
CREAT SERPL-MCNC: 5.1 MG/DL (ref 0.5–1.2)
EOSINOPHILS ABSOLUTE: 0 K/UL (ref 0–0.6)
EOSINOPHILS RELATIVE PERCENT: 0 % (ref 0–5)
GFR NON-AFRICAN AMERICAN: 11
GLUCOSE BLD-MCNC: 137 MG/DL (ref 70–99)
GLUCOSE BLD-MCNC: 145 MG/DL (ref 74–109)
GLUCOSE BLD-MCNC: 155 MG/DL (ref 70–99)
GLUCOSE BLD-MCNC: 177 MG/DL (ref 70–99)
HCT VFR BLD CALC: 25.1 % (ref 42–52)
HEMOGLOBIN: 8.2 G/DL (ref 14–18)
INR BLD: 1.38 (ref 0.88–1.18)
LYMPHOCYTES ABSOLUTE: 0.9 K/UL (ref 1.1–4.5)
LYMPHOCYTES RELATIVE PERCENT: 12.1 % (ref 20–40)
MAGNESIUM: 1.9 MG/DL (ref 1.6–2.4)
MCH RBC QN AUTO: 28.8 PG (ref 27–31)
MCHC RBC AUTO-ENTMCNC: 32.7 G/DL (ref 33–37)
MCV RBC AUTO: 88.1 FL (ref 80–94)
MONOCYTES ABSOLUTE: 0.9 K/UL (ref 0–0.9)
MONOCYTES RELATIVE PERCENT: 12.3 % (ref 0–10)
NEUTROPHILS ABSOLUTE: 5.1 K/UL (ref 1.5–7.5)
NEUTROPHILS RELATIVE PERCENT: 73 % (ref 50–65)
PDW BLD-RTO: 16.2 % (ref 11.5–14.5)
PERFORMED ON: ABNORMAL
PHOSPHORUS: 4.4 MG/DL (ref 2.5–4.5)
PLATELET # BLD: 102 K/UL (ref 130–400)
PMV BLD AUTO: 10.9 FL (ref 9.4–12.4)
POTASSIUM REFLEX MAGNESIUM: 3.9 MMOL/L (ref 3.5–5)
PROTHROMBIN TIME: 16.9 SEC (ref 12–14.6)
RBC # BLD: 2.85 M/UL (ref 4.7–6.1)
SODIUM BLD-SCNC: 134 MMOL/L (ref 136–145)
TOTAL PROTEIN: 4.9 G/DL (ref 6.6–8.7)
TROPONIN: 1.71 NG/ML (ref 0–0.03)
WBC # BLD: 7 K/UL (ref 4.8–10.8)

## 2018-10-31 PROCEDURE — 2140000000 HC CCU INTERMEDIATE R&B

## 2018-10-31 PROCEDURE — 2580000003 HC RX 258: Performed by: FAMILY MEDICINE

## 2018-10-31 PROCEDURE — 85730 THROMBOPLASTIN TIME PARTIAL: CPT

## 2018-10-31 PROCEDURE — 84484 ASSAY OF TROPONIN QUANT: CPT

## 2018-10-31 PROCEDURE — 99231 SBSQ HOSP IP/OBS SF/LOW 25: CPT | Performed by: INTERNAL MEDICINE

## 2018-10-31 PROCEDURE — 82948 REAGENT STRIP/BLOOD GLUCOSE: CPT

## 2018-10-31 PROCEDURE — 2580000003 HC RX 258: Performed by: PHYSICIAN ASSISTANT

## 2018-10-31 PROCEDURE — 84100 ASSAY OF PHOSPHORUS: CPT

## 2018-10-31 PROCEDURE — 6370000000 HC RX 637 (ALT 250 FOR IP): Performed by: INTERNAL MEDICINE

## 2018-10-31 PROCEDURE — 85025 COMPLETE CBC W/AUTO DIFF WBC: CPT

## 2018-10-31 PROCEDURE — 5A1D70Z PERFORMANCE OF URINARY FILTRATION, INTERMITTENT, LESS THAN 6 HOURS PER DAY: ICD-10-PCS | Performed by: INTERNAL MEDICINE

## 2018-10-31 PROCEDURE — 83735 ASSAY OF MAGNESIUM: CPT

## 2018-10-31 PROCEDURE — 85610 PROTHROMBIN TIME: CPT

## 2018-10-31 PROCEDURE — C9113 INJ PANTOPRAZOLE SODIUM, VIA: HCPCS | Performed by: FAMILY MEDICINE

## 2018-10-31 PROCEDURE — 6360000002 HC RX W HCPCS: Performed by: FAMILY MEDICINE

## 2018-10-31 PROCEDURE — 8010000000 HC HEMODIALYSIS ACUTE INPT

## 2018-10-31 PROCEDURE — 80053 COMPREHEN METABOLIC PANEL: CPT

## 2018-10-31 RX ORDER — HEPARIN SODIUM 1000 [USP'U]/ML
1800 INJECTION, SOLUTION INTRAVENOUS; SUBCUTANEOUS ONCE
Status: DISCONTINUED | OUTPATIENT
Start: 2018-10-31 | End: 2018-11-02 | Stop reason: HOSPADM

## 2018-10-31 RX ADMIN — Medication 10 ML: at 08:14

## 2018-10-31 RX ADMIN — METOPROLOL SUCCINATE 25 MG: 25 TABLET, EXTENDED RELEASE ORAL at 08:08

## 2018-10-31 RX ADMIN — Medication 10 ML: at 20:49

## 2018-10-31 RX ADMIN — PANTOPRAZOLE SODIUM 40 MG: 40 INJECTION, POWDER, FOR SOLUTION INTRAVENOUS at 08:08

## 2018-10-31 RX ADMIN — INSULIN LISPRO 1 UNITS: 100 INJECTION, SOLUTION INTRAVENOUS; SUBCUTANEOUS at 20:49

## 2018-10-31 RX ADMIN — Medication 10 ML: at 08:08

## 2018-10-31 ASSESSMENT — PAIN SCALES - GENERAL
PAINLEVEL_OUTOF10: 0

## 2018-10-31 ASSESSMENT — ENCOUNTER SYMPTOMS: COUGH: 0

## 2018-10-31 NOTE — PROGRESS NOTES
Pt received medication and is resting. No complaints at this time will continue to monitor.  Electronically signed by Marcie Resendez RN on 10/31/2018 at 1:07 AM

## 2018-10-31 NOTE — PROGRESS NOTES
Labs      10/29/18   0215  10/30/18   0445  10/31/18   0425   WBC  7.8  7.5  7.0   HGB  8.3*  8.4*  8.2*   HCT  25.6*  26.0*  25.1*   MCV  88.3  89.3  88.1   PLT  98*  104*  102*     LIVER PROFILE:   Recent Labs      10/29/18   0215  10/30/18   0445  10/31/18   0425   AST  231*  135*  99*   ALT  1,136*  772*  584*   BILITOT  9.1*  6.7*  5.3*   ALKPHOS  190*  205*  225*     PT/INR:   Recent Labs      10/29/18   0215  10/30/18   0445  10/31/18   0425   PROTIME  19.5*  18.2*  16.9*   INR  1.65*  1.51*  1.38*     APTT:   Recent Labs      10/29/18   0215  10/30/18   0445  10/31/18   0425   APTT  32.7  32.3  32.4     BNP:  No results for input(s): BNP in the last 72 hours. Ionized Calcium:No results for input(s): IONCA in the last 72 hours. Magnesium:  Recent Labs      10/29/18   0215  10/30/18   0445  10/31/18   0425   MG  1.9  1.8  1.9     Phosphorus:  Recent Labs      10/29/18   0215  10/30/18   0445  10/31/18   0425   PHOS  4.5  4.2  4.4     HgbA1C: No results for input(s): LABA1C in the last 72 hours. Hepatic:   Recent Labs      10/29/18   0215  10/30/18   0445  10/31/18   0425   ALKPHOS  190*  205*  225*   ALT  1,136*  772*  584*   AST  231*  135*  99*   PROT  4.7*  4.7*  4.9*   BILITOT  9.1*  6.7*  5.3*   LABALBU  2.7*  2.6*  2.7*     Lactic Acid: No results for input(s): LACTA in the last 72 hours. Troponin: No results for input(s): CKTOTAL, CKMB, TROPONINT in the last 72 hours. ABGs:   Lab Results   Component Value Date    PHART 7.430 10/25/2018    PO2ART 85.0 10/25/2018    YQX5HPR 46.0 10/25/2018     CRP:  No results for input(s): CRP in the last 72 hours. Sed Rate:  No results for input(s): SEDRATE in the last 72 hours. Culture Results:   Blood Culture Recent:   Recent Labs      10/23/18   1015   BC  No growth after 5 days of incubation. Urine Culture Recent : No results for input(s): LABURIN in the last 720 hours.     Radiology reports as per the Radiologist  Radiology: Xr Knee Left (1-2 bronchial tree are patent. Heart: The heart is normal in size. There is no pericardial effusion. Lymph nodes: No pathologically enlarged mediastinal, hilar, or axillary lymph nodes are present. Bones and soft tissues: The osseous structures of the thorax and surrounding soft tissues demonstrate no acute process. Upper abdomen: The imaged portion of the upper abdomen demonstrates no acute process. 1. No evidence of embolic disease. 2. Bilateral pleural effusions are noted. 3. Bibasilar dependent atelectasis.  Signed by Dr Ren Dumont on 10/23/2018 7:39 AM       Assessment   ANA / ATN  Hyperkalemia  Metabolic acidosis  UTI with septic shock  Shock liver  Anemia  Hyponatremia  Elevated troponin    Plan:  HD MWF prn  D/w pt/RN    Jay Peterson MD  10/31/18  3:40 PM

## 2018-10-31 NOTE — PROGRESS NOTES
10/25/18 1403     Narrative:       XR CHEST PORTABLE 10/25/2018 12:30 PM  HISTORY: PIC catheter placement  COMPARISON: October 24, 2018. FINDINGS:   The left lung is clear. There is a persistent opacity in the right  lung base. This may be residual pulmonary edema, possibly atelectasis  or an inflammatory process. . Cardiac silhouette is mildly enlarged. A  right PIC catheter is present with the distal tip satisfactorily  positioned in the superior vena cava. .   The osseous structures and surrounding soft tissues demonstrate no  acute abnormality.     Impression:       1. Persistent opacity in the right lung base. This may be residual  pulmonary edema, possibly atelectasis or an inflammatory process this  is unchanged from October 24.     Signed by Dr Hernando Terrell on 10/25/2018 2:01 PM     XR CHEST PORTABLE [[id number]] Resulted: 10/24/18 0814     Order Status: Completed Updated: 10/24/18 0816     Narrative:       EXAMINATION: XR CHEST PORTABLE 10/24/2018 8:12 AM  HISTORY: Pleural effusion, possible pneumonia  COMPARISON: 10/23/2018  FINDINGS:  The heart and mediastinal contours are stable. The aorta is tortuous  with atherosclerotic calcifications. The pulmonary vasculature remains  somewhat indistinct, though there has been improvement in the  bilateral perihilar opacities. Airspace opacities persist at the lung  bases, possibly atelectasis. There is no appreciable pneumothorax.     Impression:       Improving pulmonary edema and pulmonary vascular  congestion. Bibasilar atelectasis. Signed by Dr Alondra Vee on 10/24/2018 8:14 AM     US RENAL COMPLETE [[id number]] Resulted: 10/23/18 1757     Order Status: Completed Updated: 10/23/18 1759     Narrative:       EXAMINATION: US RENAL COMPLETE 10/23/2018 5:53 PM  HISTORY: Acute renal failure. Gross hematuria.   Report: Sonographic images of the kidneys were obtained, comparison is  made with the previous ultrasound 10/17/2018.   The right kidney measures 12.0 x 4.9 x above finding are recorded on a digital voice clip in PACS. Signed by Dr Davis Ashford on 10/23/2018 7:45 AM     CTA PULMONARY W CONTRAST [[id number]] Resulted: 10/23/18 0739     Order Status: Completed Updated: 10/23/18 0741     Narrative:       CTA PULMONARY W CONTRAST 10/23/2018 4:00 AM  HISTORY:  COMPARISON: None. DLP: 863 mGy cm  TECHNIQUE: Helical tomographic images of the chest were obtained after  the administration of intravenous contrast following angiogram  protocol. Additionally, 3D MIP reconstructions in the coronal and  sagittal planes were provided.     FINDINGS:    Pulmonary arteries: There is adequate enhancement of the pulmonary  arteries to evaluate for central and segmental pulmonary emboli. There  are no filling defects within the main, lobar, segmental or visualized  subsegmental pulmonary arteries. .   Aorta and great vessels: There is atherosclerosis in the aorta. There  is atherosclerosis in the great vessels without evidence of stenosis. Coronary artery calcifications are visualized. Neck base: The imaged portion of the base of the neck appears  unremarkable. Lungs: The lungs are clear. Bilateral pleural effusions are present  slightly greater on the right than the left. Bibasilar atelectasis is  also noted. The trachea and bronchial tree are patent. Heart: The heart is normal in size. There is no pericardial effusion. Lymph nodes: No pathologically enlarged mediastinal, hilar, or  axillary lymph nodes are present. Bones and soft tissues: The osseous structures of the thorax and  surrounding soft tissues demonstrate no acute process. Upper abdomen: The imaged portion of the upper abdomen demonstrates no  acute process.      Impression:       1. No evidence of embolic disease. 2. Bilateral pleural effusions are noted. 3. Bibasilar dependent atelectasis.   Signed by Dr Jania Burger on 10/23/2018 7:39 AM     CT ABDOMEN PELVIS W IV CONTRAST Additional Contrast? None [[id number]] Resulted: 10/23/18 0738     Order Status: Completed Updated: 10/23/18 0741     Narrative:       Examination. CT ABDOMEN PELVIS W IV CONTRAST  History: Abnormal liver function tests. DLP: 164 8mGycm. The CT scan of the abdomen and pelvis is performed without oral or  intravenous contrast enhancement. The images are acquired in axial  plane with subsequent reconstruction in coronal and sagittal planes. The comparison is made with the previous study dated 8/7/2017. There are extensive artifacts which limit the diagnostic yield of the  study. The lung bases included in the study show small bibasilar pleural  effusion, more on the right side and a mild by basilar dependent  atelectasis and compression atelectasis. Atheromatous changes of the  thoracic aorta and coronary arteries are seen. The visualized liver and spleen appear unremarkable. There is moderate diffuse thickening of the gallbladder wall with  small amount of fluid in the gallbladder fossa. No radiopaque calculi  are seen. The common bile duct is not dilated. The pancreas appear normal. The pancreatic duct is not dilated. The adrenal glands bilaterally appear normal.  The kidneys are unremarkable. There is bilateral perirenal fat  infiltration. No evidence of hydronephrosis. The ureters are not  optimally visualized. The urinary bladder is decompressed. There is an  apparent soft tissue density located posteriorly at the base of the  urinary bladder which may be extrinsic pressure of the enlarged  prostate? Tonia Hester This may be further evaluated. The prostate is moderately enlarged. There are small fat-containing inguinal hernias bilaterally. The stomach appears normal. Normal small bowel is seen. Appendix is  not visualized. There are no finding to suggest appendicitis. Moderate  gas and stool are seen in the colon. There are severe atheromatous changes of the abdominal aorta and iliac  arteries. No aneurysmal dilatation or dissection.   There is a mild

## 2018-10-31 NOTE — PROGRESS NOTES
Summary (Last 24 hours) at 10/31/18 1837  Last data filed at 10/31/18 1339   Gross per 24 hour   Intake              500 ml   Output             1000 ml   Net             -500 ml     Medications:    Current Facility-Administered Medications   Medication Dose Route Frequency Provider Last Rate Last Dose    heparin (porcine) injection 1,800 Units  1,800 Units Intravenous Once Jay Peterson MD        polyethylene glycol Ibarra Plaster) packet 17 g  17 g Oral Daily Gurjit Mello MD   17 g at 10/28/18 1312    polyethylene glycol (GLYCOLAX) packet 17 g  17 g Oral 3 times per day on Sun Gurjit Mello MD   17 g at 10/28/18 2055    metoprolol succinate (TOPROL XL) extended release tablet 25 mg  25 mg Oral Daily Mary Lou Pino MD   25 mg at 10/31/18 8782    sodium chloride flush 0.9 % injection 10 mL  10 mL Intravenous PRN Keily Mccray MD        insulin lispro (HUMALOG) injection vial 0-6 Units  0-6 Units Subcutaneous TID WC Angelo Liriano MD   1 Units at 10/30/18 1755    insulin lispro (HUMALOG) injection vial 0-3 Units  0-3 Units Subcutaneous Nightly Angelo Liriano MD   1 Units at 10/30/18 2158    glucose (GLUTOSE) 40 % oral gel 15 g  15 g Oral PRN Angelo Liriano MD        dextrose 50 % solution 12.5 g  12.5 g Intravenous PRN Angelo Liriano MD        glucagon (rDNA) injection 1 mg  1 mg Intramuscular PRN Angelo Liriano MD        dextrose 50 % solution 12.5 g  12.5 g Intravenous PRN Blas Valenzuela MD        sodium chloride flush 0.9 % injection 10 mL  10 mL Intravenous 2 times per day Sapphire Paredes PA-C   10 mL at 10/31/18 0814    sodium chloride flush 0.9 % injection 10 mL  10 mL Intravenous PRN Sapphire Paredes PA-C        pantoprazole (PROTONIX) injection 40 mg  40 mg Intravenous Daily Thang Orantes, DO   40 mg at 10/31/18 0808    And    sodium chloride (PF) 0.9 % injection 10 mL  10 mL Intravenous Daily Thang Orantes, DO   10 mL at 10/31/18 2614        Labs:     Recent

## 2018-10-31 NOTE — PROGRESS NOTES
Patient is post op day #1 placement of right IJ tunneled hemodialysis catheter per  on 10/30/18. Patient currently in dialysis with treatment running per catheter without difficulty, good flow rate noted. Right IJ catheter with dressing CDI, moderate amount of bruising, mild edema noted.

## 2018-11-01 LAB
ALBUMIN SERPL-MCNC: 2.7 G/DL (ref 3.5–5.2)
ALP BLD-CCNC: 244 U/L (ref 40–130)
ALT SERPL-CCNC: 443 U/L (ref 5–41)
ANION GAP SERPL CALCULATED.3IONS-SCNC: 11 MMOL/L (ref 7–19)
APTT: 32.4 SEC (ref 26–36.2)
AST SERPL-CCNC: 84 U/L (ref 5–40)
BASOPHILS ABSOLUTE: 0 K/UL (ref 0–0.2)
BASOPHILS RELATIVE PERCENT: 0.2 % (ref 0–1)
BILIRUB SERPL-MCNC: 4.8 MG/DL (ref 0.2–1.2)
BUN BLDV-MCNC: 47 MG/DL (ref 8–23)
CALCIUM SERPL-MCNC: 8.1 MG/DL (ref 8.8–10.2)
CHLORIDE BLD-SCNC: 98 MMOL/L (ref 98–111)
CO2: 28 MMOL/L (ref 22–29)
CREAT SERPL-MCNC: 3.4 MG/DL (ref 0.5–1.2)
EOSINOPHILS ABSOLUTE: 0 K/UL (ref 0–0.6)
EOSINOPHILS RELATIVE PERCENT: 0 % (ref 0–5)
GFR NON-AFRICAN AMERICAN: 18
GLUCOSE BLD-MCNC: 146 MG/DL (ref 70–99)
GLUCOSE BLD-MCNC: 151 MG/DL (ref 70–99)
GLUCOSE BLD-MCNC: 155 MG/DL (ref 74–109)
GLUCOSE BLD-MCNC: 182 MG/DL (ref 70–99)
GLUCOSE BLD-MCNC: 200 MG/DL (ref 70–99)
HCT VFR BLD CALC: 25.1 % (ref 42–52)
HEMOGLOBIN: 8.2 G/DL (ref 14–18)
INR BLD: 1.34 (ref 0.88–1.18)
LYMPHOCYTES ABSOLUTE: 1 K/UL (ref 1.1–4.5)
LYMPHOCYTES RELATIVE PERCENT: 15.1 % (ref 20–40)
MAGNESIUM: 1.8 MG/DL (ref 1.6–2.4)
MCH RBC QN AUTO: 29 PG (ref 27–31)
MCHC RBC AUTO-ENTMCNC: 32.7 G/DL (ref 33–37)
MCV RBC AUTO: 88.7 FL (ref 80–94)
MONOCYTES ABSOLUTE: 0.9 K/UL (ref 0–0.9)
MONOCYTES RELATIVE PERCENT: 14.4 % (ref 0–10)
NEUTROPHILS ABSOLUTE: 4.4 K/UL (ref 1.5–7.5)
NEUTROPHILS RELATIVE PERCENT: 68.7 % (ref 50–65)
PDW BLD-RTO: 16.9 % (ref 11.5–14.5)
PERFORMED ON: ABNORMAL
PHOSPHORUS: 3.9 MG/DL (ref 2.5–4.5)
PLATELET # BLD: 99 K/UL (ref 130–400)
PMV BLD AUTO: 10.6 FL (ref 9.4–12.4)
POTASSIUM REFLEX MAGNESIUM: 4 MMOL/L (ref 3.5–5)
PROTHROMBIN TIME: 16.5 SEC (ref 12–14.6)
RBC # BLD: 2.83 M/UL (ref 4.7–6.1)
SODIUM BLD-SCNC: 137 MMOL/L (ref 136–145)
TOTAL PROTEIN: 5 G/DL (ref 6.6–8.7)
TROPONIN: 1.34 NG/ML (ref 0–0.03)
WBC # BLD: 6.4 K/UL (ref 4.8–10.8)

## 2018-11-01 PROCEDURE — 97116 GAIT TRAINING THERAPY: CPT

## 2018-11-01 PROCEDURE — 84100 ASSAY OF PHOSPHORUS: CPT

## 2018-11-01 PROCEDURE — 6370000000 HC RX 637 (ALT 250 FOR IP): Performed by: NURSE PRACTITIONER

## 2018-11-01 PROCEDURE — 6370000000 HC RX 637 (ALT 250 FOR IP): Performed by: INTERNAL MEDICINE

## 2018-11-01 PROCEDURE — 82948 REAGENT STRIP/BLOOD GLUCOSE: CPT

## 2018-11-01 PROCEDURE — 84484 ASSAY OF TROPONIN QUANT: CPT

## 2018-11-01 PROCEDURE — 85730 THROMBOPLASTIN TIME PARTIAL: CPT

## 2018-11-01 PROCEDURE — 85025 COMPLETE CBC W/AUTO DIFF WBC: CPT

## 2018-11-01 PROCEDURE — 2580000003 HC RX 258: Performed by: PHYSICIAN ASSISTANT

## 2018-11-01 PROCEDURE — 6370000000 HC RX 637 (ALT 250 FOR IP): Performed by: FAMILY MEDICINE

## 2018-11-01 PROCEDURE — 2140000000 HC CCU INTERMEDIATE R&B

## 2018-11-01 PROCEDURE — 99232 SBSQ HOSP IP/OBS MODERATE 35: CPT | Performed by: FAMILY MEDICINE

## 2018-11-01 PROCEDURE — 85610 PROTHROMBIN TIME: CPT

## 2018-11-01 PROCEDURE — 99232 SBSQ HOSP IP/OBS MODERATE 35: CPT | Performed by: NURSE PRACTITIONER

## 2018-11-01 PROCEDURE — 83735 ASSAY OF MAGNESIUM: CPT

## 2018-11-01 PROCEDURE — 80053 COMPREHEN METABOLIC PANEL: CPT

## 2018-11-01 RX ORDER — PANTOPRAZOLE SODIUM 40 MG/1
40 TABLET, DELAYED RELEASE ORAL
Status: DISCONTINUED | OUTPATIENT
Start: 2018-11-01 | End: 2018-11-02 | Stop reason: HOSPADM

## 2018-11-01 RX ORDER — TAMSULOSIN HYDROCHLORIDE 0.4 MG/1
0.4 CAPSULE ORAL DAILY
Status: DISCONTINUED | OUTPATIENT
Start: 2018-11-01 | End: 2018-11-02 | Stop reason: HOSPADM

## 2018-11-01 RX ADMIN — INSULIN LISPRO 1 UNITS: 100 INJECTION, SOLUTION INTRAVENOUS; SUBCUTANEOUS at 19:54

## 2018-11-01 RX ADMIN — TAMSULOSIN HYDROCHLORIDE 0.4 MG: 0.4 CAPSULE ORAL at 14:11

## 2018-11-01 RX ADMIN — INSULIN LISPRO 2 UNITS: 100 INJECTION, SOLUTION INTRAVENOUS; SUBCUTANEOUS at 14:11

## 2018-11-01 RX ADMIN — Medication 10 ML: at 19:51

## 2018-11-01 RX ADMIN — PANTOPRAZOLE SODIUM 40 MG: 40 TABLET, DELAYED RELEASE ORAL at 06:02

## 2018-11-01 RX ADMIN — Medication 10 ML: at 09:52

## 2018-11-01 RX ADMIN — METOPROLOL SUCCINATE 25 MG: 25 TABLET, EXTENDED RELEASE ORAL at 09:52

## 2018-11-01 ASSESSMENT — ENCOUNTER SYMPTOMS
ABDOMINAL PAIN: 0
CONSTIPATION: 0

## 2018-11-01 ASSESSMENT — PAIN SCALES - GENERAL
PAINLEVEL_OUTOF10: 0

## 2018-11-01 NOTE — PROGRESS NOTES
Hospitalist Progress Note  11/1/2018 10:33 AM  Subjective:   Admit Date: 10/23/2018  PCP: Vinod Wheeler MD    Chief Complaint: septic shock    Subjective: Patient is denying any new complaints. Rondon Angst to work with therapy and get back on his feet. Awaiting hemodialysis chair time prior to transfer to [facility] for short-term rehab. History is otherwise unchanged. [facility]:   10/23: Admitted to ICU for septic shock, multiorgan failure. Started on pressors and broad spectrum abx. 10/24: Broaden antibiotics, continue with current care. 10/25: Patient now receiving HD. LFTs appear to be trending down. Continue broad spectrum abx and wean levophed as tolerated. Will discuss with surgery about percutaneous drainage for ?cholecystitis. 10/26: Patient restarted on insulin gtt overnight, will change back to SSI as patient has had minimal requirements throughout the day. Continue to monitor and adjust as needed. ID consulted yesterday, appreciate recs. 10/27: Patient numbers continue to improve, however was back on levophed for 6 hours overnight. Will continue current care. Continue to monitor in ICU. Consider transfer to floor tomorrow if off pressors for 24 hours. Once to floor, will order PT/OT. Patient to receive 1 unit PRBC per heme. 10/28: Continue current care. Transfer to PCU. PT/OT eval.    10/29: Patient to have Permacath placed tomorrow. HD per nephro. Monitor off abx at this time. BB added per cardiology. Hematology recommending SCDs for VTE ppx. H&H remains stable. Continue to work with PT/OT. Will discharge planning once stable. Discussed with case management. Abx discontinued, will monitor off.  10/30: Continue to work with PT/OT. Permacath placed. Patient continues to improve. LFTs trending down. Will need to discuss with subspecialties regarding discharge planning if not other acute interventions planned. 10/31: Patient remains stable, afebrile off abx. Continue d/c planning. 11-1: Awaiting HD chair time for discharge to SNF for rehab. ROS: 14 point review of systems is negative except as specifically addressed above.     DIET RENAL; Carb Control: 4 carb choices (60 gms)/meal    Intake/Output Summary (Last 24 hours) at 11/01/18 1033  Last data filed at 11/01/18 0449   Gross per 24 hour   Intake              680 ml   Output              700 ml   Net              -20 ml     Medications:  Current Facility-Administered Medications   Medication Dose Route Frequency Provider Last Rate Last Dose    pantoprazole (PROTONIX) tablet 40 mg  40 mg Oral QAM AC Thang Orantes, DO   40 mg at 11/01/18 0602    tamsulosin (FLOMAX) capsule 0.4 mg  0.4 mg Oral Daily ELISABETH Rausch        heparin (porcine) injection 1,800 Units  1,800 Units Intravenous Once Moise Hassan MD        polyethylene glycol [facility]) packet 17 g  17 g Oral Daily Latasha Odom MD   17 g at 10/28/18 1312    polyethylene glycol (GLYCOLAX) packet 17 g  17 g Oral 3 times per day on Sun Latasha Odom MD   17 g at 10/28/18 2055    metoprolol succinate (TOPROL XL) extended release tablet 25 mg  25 mg Oral Daily Minoo Redmond MD   25 mg at 11/01/18 3894    sodium chloride flush 0.9 % injection 10 mL  10 mL Intravenous PRN Rossana Richards MD        insulin lispro (HUMALOG) injection vial 0-6 Units  0-6 Units Subcutaneous TID WC Dameon Rivera MD   1 Units at 10/30/18 1755    insulin lispro (HUMALOG) injection vial 0-3 Units  0-3 Units Subcutaneous Nightly Dameon Rivera MD   1 Units at 10/31/18 2049    glucose (GLUTOSE) 40 % oral gel 15 g  15 g Oral PRN Dameon Rivera MD        dextrose 50 % solution 12.5 g  12.5 g Intravenous PRN Dameon Rivera MD        glucagon (rDNA) injection 1 mg  1 mg Intramuscular PRN Dameon Rivera MD        dextrose 50 % solution 12.5 g  12.5 g Intravenous PRN Janey Hdz MD        sodium chloride flush 0.9 % injection 10

## 2018-11-01 NOTE — PROGRESS NOTES
Spoke with Dr. Christopher Meek due to patient having hematuria and sediment in carter line when going to pull carter. Carter is to stay in place, urology will be consulted. Night nurse Ruby Curtis informed.   Electronically signed by Blanca Ramachandran RN on 10/31/2018 at 7:09 PM

## 2018-11-01 NOTE — CONSULTS
11/01/2018     Magnesium:    Lab Results   Component Value Date    MG 1.8 11/01/2018     Phosphorus:    Lab Results   Component Value Date    PHOS 3.9 11/01/2018     PT/INR:    Lab Results   Component Value Date    PROTIME 16.5 11/01/2018    INR 1.34 11/01/2018     PTT:    Lab Results   Component Value Date    APTT 32.4 11/01/2018   [APTT  Urine Culture:  No components found for: CURINE Urinalysis:  Lab Results   Component Value Date    COLORU YELLOW 10/23/2018    PHUR 7.0 10/23/2018    WBCUA 20 10/23/2018    RBCUA TNTC 10/23/2018    RBCUA >900 10/23/2018    BACTERIA NONE 10/23/2018    CLARITYU CLOUDY 10/23/2018    SPECGRAV 1.005 10/23/2018    LEUKOCYTESUR Negative 10/23/2018    UROBILINOGEN 0.2 10/23/2018    BILIRUBINUR Negative 10/23/2018    BLOODU LARGE 10/23/2018    GLUCOSEU Negative 10/23/2018      Blood Culture:    Radiology:  US renal complete 10/17/18 radiologist interpretation  A right renal cyst.   No hydronephrosis on either side. Signed by Dr Creasie Lesch on 10/17/2018 11:56 AM     CT ABD and pelvis W IV contrast 10/23/18 radiologist interpretation  A small amount of abdominal ascites. Moderate thickening of the wall of the gallbladder with a fluid in the   gallbladder fossa may represent acute cholecystitis. No gallstones are   noted. This may be clinically correlated and further evaluated. The urinary bladder is decompressed. There is an apparent soft tissue   density located posteriorly at the floor of the urinary bladder which   may be extrinsic pressure from the enlarged prostate. This may be   clinically correlated. A small bibasilar pleural effusion, more on the right side. The severe atheromatous changes of the abdominal aorta and iliac   arteries. The above study was initially reviewed and reported by stat rads. I do   not find any discrepancies.    Signed by Dr Creasie Lesch on 10/23/2018 7:38 AM       ASSESSMENT AND PLAN:    Gross Hematuria- At this time there are no clots or

## 2018-11-01 NOTE — PROGRESS NOTES
Pt received medication and is resting. He mentioned that he definitely wants his miralax tomorrow morning. No other complaints. I will continue to monitor.  Electronically signed by Wendie Cortez RN on 11/1/2018 at 2:07 AM

## 2018-11-02 VITALS
SYSTOLIC BLOOD PRESSURE: 105 MMHG | OXYGEN SATURATION: 97 % | HEART RATE: 80 BPM | BODY MASS INDEX: 36.08 KG/M2 | HEIGHT: 72 IN | RESPIRATION RATE: 16 BRPM | WEIGHT: 266.4 LBS | DIASTOLIC BLOOD PRESSURE: 60 MMHG | TEMPERATURE: 97 F

## 2018-11-02 PROBLEM — E87.20 METABOLIC ACIDOSIS: Status: RESOLVED | Noted: 2018-10-23 | Resolved: 2018-11-02

## 2018-11-02 PROBLEM — D68.9 COAGULOPATHY (HCC): Status: RESOLVED | Noted: 2018-10-23 | Resolved: 2018-11-02

## 2018-11-02 PROBLEM — E87.5 HYPERKALEMIA: Status: RESOLVED | Noted: 2018-10-23 | Resolved: 2018-11-02

## 2018-11-02 PROBLEM — A41.9 SEPSIS (HCC): Status: RESOLVED | Noted: 2018-10-23 | Resolved: 2018-11-02

## 2018-11-02 PROBLEM — R31.0 GROSS HEMATURIA: Status: RESOLVED | Noted: 2018-10-23 | Resolved: 2018-11-02

## 2018-11-02 PROBLEM — J96.01 ACUTE RESPIRATORY FAILURE WITH HYPOXIA (HCC): Status: RESOLVED | Noted: 2018-10-23 | Resolved: 2018-11-02

## 2018-11-02 PROBLEM — R65.21 SEPTIC SHOCK (HCC): Status: RESOLVED | Noted: 2018-10-23 | Resolved: 2018-11-02

## 2018-11-02 PROBLEM — E87.8 HYPOCHLOREMIA: Status: RESOLVED | Noted: 2018-10-23 | Resolved: 2018-11-02

## 2018-11-02 PROBLEM — A41.9 SEPTIC SHOCK (HCC): Status: RESOLVED | Noted: 2018-10-23 | Resolved: 2018-11-02

## 2018-11-02 LAB
ALBUMIN SERPL-MCNC: 2.7 G/DL (ref 3.5–5.2)
ALP BLD-CCNC: 276 U/L (ref 40–130)
ALT SERPL-CCNC: 352 U/L (ref 5–41)
ANION GAP SERPL CALCULATED.3IONS-SCNC: 10 MMOL/L (ref 7–19)
APTT: 32.5 SEC (ref 26–36.2)
AST SERPL-CCNC: 79 U/L (ref 5–40)
BASOPHILS ABSOLUTE: 0 K/UL (ref 0–0.2)
BASOPHILS RELATIVE PERCENT: 0.2 % (ref 0–1)
BILIRUB SERPL-MCNC: 3.8 MG/DL (ref 0.2–1.2)
BUN BLDV-MCNC: 55 MG/DL (ref 8–23)
CALCIUM SERPL-MCNC: 8.3 MG/DL (ref 8.8–10.2)
CHLORIDE BLD-SCNC: 98 MMOL/L (ref 98–111)
CO2: 28 MMOL/L (ref 22–29)
CREAT SERPL-MCNC: 3.2 MG/DL (ref 0.5–1.2)
EOSINOPHILS ABSOLUTE: 0 K/UL (ref 0–0.6)
EOSINOPHILS RELATIVE PERCENT: 0 % (ref 0–5)
GFR NON-AFRICAN AMERICAN: 19
GLUCOSE BLD-MCNC: 121 MG/DL (ref 70–99)
GLUCOSE BLD-MCNC: 151 MG/DL (ref 70–99)
GLUCOSE BLD-MCNC: 172 MG/DL (ref 74–109)
HCT VFR BLD CALC: 24.9 % (ref 42–52)
HEMOGLOBIN: 8 G/DL (ref 14–18)
INR BLD: 1.29 (ref 0.88–1.18)
LYMPHOCYTES ABSOLUTE: 0.9 K/UL (ref 1.1–4.5)
LYMPHOCYTES RELATIVE PERCENT: 14.7 % (ref 20–40)
MAGNESIUM: 1.7 MG/DL (ref 1.6–2.4)
MCH RBC QN AUTO: 28.8 PG (ref 27–31)
MCHC RBC AUTO-ENTMCNC: 32.1 G/DL (ref 33–37)
MCV RBC AUTO: 89.6 FL (ref 80–94)
MONOCYTES ABSOLUTE: 0.9 K/UL (ref 0–0.9)
MONOCYTES RELATIVE PERCENT: 15.1 % (ref 0–10)
NEUTROPHILS ABSOLUTE: 4.1 K/UL (ref 1.5–7.5)
NEUTROPHILS RELATIVE PERCENT: 69 % (ref 50–65)
PDW BLD-RTO: 17.2 % (ref 11.5–14.5)
PERFORMED ON: ABNORMAL
PERFORMED ON: ABNORMAL
PHOSPHORUS: 3.9 MG/DL (ref 2.5–4.5)
PLATELET # BLD: 85 K/UL (ref 130–400)
PMV BLD AUTO: 10.7 FL (ref 9.4–12.4)
POTASSIUM SERPL-SCNC: 3.9 MMOL/L (ref 3.5–5)
PROTHROMBIN TIME: 16 SEC (ref 12–14.6)
RBC # BLD: 2.78 M/UL (ref 4.7–6.1)
SODIUM BLD-SCNC: 136 MMOL/L (ref 136–145)
TOTAL PROTEIN: 5 G/DL (ref 6.6–8.7)
TROPONIN: 1.18 NG/ML (ref 0–0.03)
WBC # BLD: 5.9 K/UL (ref 4.8–10.8)

## 2018-11-02 PROCEDURE — 99238 HOSP IP/OBS DSCHRG MGMT 30/<: CPT | Performed by: FAMILY MEDICINE

## 2018-11-02 PROCEDURE — 8010000000 HC HEMODIALYSIS ACUTE INPT

## 2018-11-02 PROCEDURE — 84100 ASSAY OF PHOSPHORUS: CPT

## 2018-11-02 PROCEDURE — 83735 ASSAY OF MAGNESIUM: CPT

## 2018-11-02 PROCEDURE — 6370000000 HC RX 637 (ALT 250 FOR IP): Performed by: NURSE PRACTITIONER

## 2018-11-02 PROCEDURE — 5A1D70Z PERFORMANCE OF URINARY FILTRATION, INTERMITTENT, LESS THAN 6 HOURS PER DAY: ICD-10-PCS | Performed by: INTERNAL MEDICINE

## 2018-11-02 PROCEDURE — 2580000003 HC RX 258: Performed by: PHYSICIAN ASSISTANT

## 2018-11-02 PROCEDURE — 99231 SBSQ HOSP IP/OBS SF/LOW 25: CPT | Performed by: NURSE PRACTITIONER

## 2018-11-02 PROCEDURE — 85610 PROTHROMBIN TIME: CPT

## 2018-11-02 PROCEDURE — 6370000000 HC RX 637 (ALT 250 FOR IP): Performed by: FAMILY MEDICINE

## 2018-11-02 PROCEDURE — 6370000000 HC RX 637 (ALT 250 FOR IP): Performed by: INTERNAL MEDICINE

## 2018-11-02 PROCEDURE — 85730 THROMBOPLASTIN TIME PARTIAL: CPT

## 2018-11-02 PROCEDURE — 84484 ASSAY OF TROPONIN QUANT: CPT

## 2018-11-02 PROCEDURE — 85025 COMPLETE CBC W/AUTO DIFF WBC: CPT

## 2018-11-02 PROCEDURE — 82948 REAGENT STRIP/BLOOD GLUCOSE: CPT

## 2018-11-02 PROCEDURE — 80053 COMPREHEN METABOLIC PANEL: CPT

## 2018-11-02 RX ORDER — POLYETHYLENE GLYCOL 3350 17 G/17G
17 POWDER, FOR SOLUTION ORAL DAILY
Qty: 527 G | Refills: 0 | Status: SHIPPED | OUTPATIENT
Start: 2018-11-03 | End: 2018-12-03

## 2018-11-02 RX ORDER — OXYCODONE HYDROCHLORIDE 10 MG/1
10 TABLET ORAL SEE ADMIN INSTRUCTIONS
Qty: 18 TABLET | Refills: 0 | Status: ON HOLD | OUTPATIENT
Start: 2018-11-02 | End: 2018-11-08 | Stop reason: HOSPADM

## 2018-11-02 RX ORDER — TAMSULOSIN HYDROCHLORIDE 0.4 MG/1
0.4 CAPSULE ORAL DAILY
Qty: 30 CAPSULE | Refills: 0 | Status: SHIPPED | OUTPATIENT
Start: 2018-11-03 | End: 2018-12-11

## 2018-11-02 RX ORDER — POLYETHYLENE GLYCOL 3350 17 G/17G
POWDER, FOR SOLUTION ORAL
Qty: 527 G | Refills: 0 | Status: SHIPPED | OUTPATIENT
Start: 2018-11-02 | End: 2018-12-11

## 2018-11-02 RX ADMIN — TAMSULOSIN HYDROCHLORIDE 0.4 MG: 0.4 CAPSULE ORAL at 08:24

## 2018-11-02 RX ADMIN — PANTOPRAZOLE SODIUM 40 MG: 40 TABLET, DELAYED RELEASE ORAL at 06:04

## 2018-11-02 RX ADMIN — METOPROLOL SUCCINATE 25 MG: 25 TABLET, EXTENDED RELEASE ORAL at 08:24

## 2018-11-02 RX ADMIN — Medication 10 ML: at 08:25

## 2018-11-02 RX ADMIN — INSULIN LISPRO 1 UNITS: 100 INJECTION, SOLUTION INTRAVENOUS; SUBCUTANEOUS at 08:24

## 2018-11-02 ASSESSMENT — ENCOUNTER SYMPTOMS
WHEEZING: 0
BLOOD IN STOOL: 0
SHORTNESS OF BREATH: 1
COUGH: 0
CONSTIPATION: 0
EYE REDNESS: 0
VOMITING: 0
NAUSEA: 0
ROS SKIN COMMENTS: LEFT KNEE SURGICAL INCISION
EYES NEGATIVE: 1
BACK PAIN: 0
DIARRHEA: 0
GASTROINTESTINAL NEGATIVE: 1
EYE PAIN: 0
EYE DISCHARGE: 0
ABDOMINAL PAIN: 0
SORE THROAT: 0

## 2018-11-02 NOTE — PROGRESS NOTES
 polyethylene glycol (GLYCOLAX) packet 17 g  17 g Oral Daily Siobhan Kruger MD   17 g at 10/28/18 1312    polyethylene glycol (GLYCOLAX) packet 17 g  17 g Oral 3 times per day on Sun Siobhan Kruger MD   17 g at 10/28/18 2055    metoprolol succinate (TOPROL XL) extended release tablet 25 mg  25 mg Oral Daily Jalen Ramirez MD   25 mg at 11/01/18 5846    sodium chloride flush 0.9 % injection 10 mL  10 mL Intravenous PRN Maria G Sarkar MD        insulin lispro (HUMALOG) injection vial 0-6 Units  0-6 Units Subcutaneous TID WC Angelica Urrutia MD   2 Units at 11/01/18 1411    insulin lispro (HUMALOG) injection vial 0-3 Units  0-3 Units Subcutaneous Nightly Angelica Urrutia MD   1 Units at 11/01/18 1954    glucose (GLUTOSE) 40 % oral gel 15 g  15 g Oral PRN Angelica Urrutia MD        dextrose 50 % solution 12.5 g  12.5 g Intravenous PRN Angelica Urrutia MD        glucagon (rDNA) injection 1 mg  1 mg Intramuscular PRN Angelica Urrutia MD        dextrose 50 % solution 12.5 g  12.5 g Intravenous PRN Lilia Garces MD        sodium chloride flush 0.9 % injection 10 mL  10 mL Intravenous 2 times per day Junior Flores PA-C   10 mL at 11/01/18 1951    sodium chloride flush 0.9 % injection 10 mL  10 mL Intravenous PRN Junior Flores PA-C           Past Medical History:  Past Medical History:   Diagnosis Date    Acute liver failure without hepatic coma 10/23/2018    Back pain     \"with tired legs as a result\"    Blood circulation, collateral     Carotid arterial disease ([address]     recent surgery    CHF (congestive heart failure) ([facility] [address] 10/23/2018    CKD (chronic kidney disease), 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/2018    GERD (gastroesophageal reflux disease)     Hyperlipidemia     Hypertension     Hypertension     Palliative care patient 10/23/2018    Primary osteoarthritis of left knee 10/14/2018    PVD (peripheral vascular disease) [facility])        Past Surgical History:  Past Surgical History: Liver Disease Neg Hx     Esophageal Cancer Neg Hx     Rectal Cancer Neg Hx     Stomach Cancer Neg Hx        Social History  Social History     Social History    Marital status:      Spouse name: N/A    Number of children: N/A    Years of education: N/A     Occupational History    Not on file. Social History Main Topics    Smoking status: Former Smoker     Quit date: 6/3/2003    Smokeless tobacco: Never Used    Alcohol use 7.2 oz/week     12 Glasses of wine per week      Comment: 2 glasses of wine every night    Drug use: No    Sexual activity: Yes     Partners: Female     Other Topics Concern    Not on file     Social History Narrative    No narrative on file         Review of Systems:  History obtained from chart review and the patient  General ROS: No fever or chills  Respiratory ROS: No cough, shortness of breath, wheezing  Cardiovascular ROS: No chest pain or palpitations  Gastrointestinal ROS: No abdominal pain or melena  Genito-Urinary ROS: No dysuria or hematuria  Musculoskeletal ROS: No joint pain or swelling         Objective:  Blood pressure (!) 159/81, pulse 89, temperature 96.5 °F (35.8 °C), temperature source Temporal, resp. rate 16, height 6' (1.829 m), weight 264 lb 3.2 oz (119.8 kg), SpO2 94 %.     Intake/Output Summary (Last 24 hours) at 11/01/18 2245  Last data filed at 11/01/18 1810   Gross per 24 hour   Intake              640 ml   Output              825 ml   Net             -185 ml     General: awake/alert   Chest:  clear to auscultation bilaterally without respiratory distress  CVS: regular rate and rhythm  Abdominal: soft, nontender, normal bowel sounds  Extremities: no cyanosis or edema  Skin: warm and dry without rash    Labs:  BMP:   Recent Labs      10/30/18   0445  10/31/18   0425  11/01/18   0430   NA  137  134*  137   K  4.0  3.9  4.0   CL  98  96*  98   CO2  25  24  28   PHOS  4.2  4.4  3.9   BUN  52*  67*  47*   CREATININE  4.8*  5.1*  3.4*   CALCIUM  8.1* 8. 3*  8.1*     CBC:   Recent Labs      10/30/18   0445  10/31/18   0425  11/01/18   0430   WBC  7.5  7.0  6.4   HGB  8.4*  8.2*  8.2*   HCT  26.0*  25.1*  25.1*   MCV  89.3  88.1  88.7   PLT  104*  102*  99*     LIVER PROFILE:   Recent Labs      10/30/18   0445  10/31/18   0425  11/01/18   0430   AST  135*  99*  84*   ALT  772*  584*  443*   BILITOT  6.7*  5.3*  4.8*   ALKPHOS  205*  225*  244*     PT/INR:   Recent Labs      10/30/18   0445  10/31/18   0425  11/01/18   0430   PROTIME  18.2*  16.9*  16.5*   INR  1.51*  1.38*  1.34*     APTT:   Recent Labs      10/30/18   0445  10/31/18   0425  11/01/18   0430   APTT  32.3  32.4  32.4     BNP:  No results for input(s): BNP in the last 72 hours. Ionized Calcium:No results for input(s): IONCA in the last 72 hours. Magnesium:  Recent Labs      10/30/18   0445  10/31/18   0425  11/01/18   0430   MG  1.8  1.9  1.8     Phosphorus:  Recent Labs      10/30/18   0445  10/31/18   0425  11/01/18   0430   PHOS  4.2  4.4  3.9     HgbA1C: No results for input(s): LABA1C in the last 72 hours. Hepatic:   Recent Labs      10/30/18   0445  10/31/18   0425  11/01/18   0430   ALKPHOS  205*  225*  244*   ALT  772*  584*  443*   AST  135*  99*  84*   PROT  4.7*  4.9*  5.0*   BILITOT  6.7*  5.3*  4.8*   LABALBU  2.6*  2.7*  2.7*     Lactic Acid: No results for input(s): LACTA in the last 72 hours. Troponin: No results for input(s): CKTOTAL, CKMB, TROPONINT in the last 72 hours. ABGs:   Lab Results   Component Value Date    PHART 7.430 10/25/2018    PO2ART 85.0 10/25/2018    LHO2EYM 46.0 10/25/2018     CRP:  No results for input(s): CRP in the last 72 hours. Sed Rate:  No results for input(s): SEDRATE in the last 72 hours. Culture Results:   Blood Culture Recent:   Recent Labs      10/23/18   1015   BC  No growth after 5 days of incubation. Urine Culture Recent : No results for input(s): LABURIN in the last 720 hours.     Radiology reports as per the Radiologist  Radiology: Doyne Rom

## 2018-11-02 NOTE — PROGRESS NOTES
Physical Therapy  Samantha Navarro  [id number]     11/02/18 9356   General   Missed reason Conflicting appointment   General Comment   Comments Patient in dialysis     Electronically signed by Darrin Jacobsen PTA on 11/2/2018 at 9:27 AM

## 2018-11-02 NOTE — PROGRESS NOTES
Nutrition Assessment    Type and Reason for Visit: Reassess    Nutrition Recommendations: continue current POC    Malnutrition Assessment:  · Malnutrition Status: No malnutrition  · Context: Acute illness or injury  · Findings of the 6 clinical characteristics of malnutrition (Minimum of 2 out of 6 clinical characteristics is required to make the diagnosis of moderate or severe Protein Calorie Malnutrition based on AND/ASPEN Guidelines):  1. Energy Intake-Not available,      2. Weight Loss-No significant weight loss,    3. Fat Loss-No significant subcutaneous fat loss,    4. Muscle Loss-No significant muscle mass loss,    5. Fluid Accumulation-Unable to assess,    6.  Strength-Not measured    Nutrition Diagnosis:   · Problem: Inadequate oral intake  · Etiology: related to Increased demand for energy/nutrients due to     Signs and symptoms:  as evidenced by Presence of wounds    Nutrition Assessment:  · Subjective Assessment: \"I'm going home today. \"  Wife not present at time of visit. Renal infromation left for pt and wife. Aware to have dialysis at [facility] clinic. Told pt to have wife ask for RD that is there and to sit down with her for clarification and questions she has.   Weight has decreased since starting dialysis  · Wound Type: Surgical Wound  · Current Nutrition Therapies:  · Oral Diet Orders: Renal, Carb Control 4 Carbs/Meal   · Oral Diet intake: %  · Oral Nutrition Supplement (ONS) Orders: None    · Anthropometric Measures:  · Ht: 6' (182.9 cm)   · Current Body Wt: 266 lb 6 oz (120.8 kg)  · Admission Body Wt: 233 lb (105.7 kg)  · Usual Body Wt: 233 lb (105.7 kg) (7/2018)  · % Weight Change: 124.5% or 24.5% increase,  3 months  · Ideal Body Wt: 178 lb (80.7 kg), % Ideal Body 153.3%  · BMI Classification: BMI 35.0 - 39.9 Obese Class II    Estimated Intake vs Estimated Needs: Intake Meets Needs    Nutrition Risk Level: Low    Nutrition Interventions:   Continue current diet  Continued

## 2018-11-02 NOTE — PROGRESS NOTES
Subjective:     Post-Operative Day: 17 Status Post left TKA. Pt is awake and alert. Ox3. Doing okay today. No other new issues. Systemic or Specific Pain 4. He is on BiPap at 15 l/min. Objective:     Patient Vitals for the past 24 hrs:   BP Temp Temp src Pulse Resp SpO2 Weight   11/02/18 0638 (!) 174/88 97.7 °F (36.5 °C) Temporal 86 18 98 % -   11/02/18 0452 - - - - - - 266 lb 6.4 oz (120.8 kg)   11/02/18 0226 (!) 164/78 97.1 °F (36.2 °C) Rectal 92 16 93 % -   11/01/18 2243 (!) 159/81 96.5 °F (35.8 °C) Temporal 89 16 94 % -   11/01/18 1810 (!) 170/81 97.9 °F (36.6 °C) Temporal 91 16 96 % -   11/01/18 1427 (!) 152/75 98.4 °F (36.9 °C) Temporal 89 16 94 % -       General: alert, appears stated age, cooperative, no distress and moderately obese   Exam: Good active motion to left lower extremity. His leg is warm and pink. Wound: Wound clean and dry no evidence of infection. , No Drainage and Wound Intact; Prineo is intact. Neurovascular: Exam normal     DVT Exam: No evidence of DVT seen on physical exam.   Xray:  none       Data Review:  Recent Labs      11/01/18   0430  11/02/18   0110   HGB  8.2*  8.0*     Recent Labs      11/02/18   0110   NA  136   K  3.9   CREATININE  3.2*     Recent Labs      11/02/18   0110   LABGLOM  19*     Recent Labs      11/02/18   0110   INR  1.29*     Assessment:     Status Post left TKA. Acute kidney injury  hypotension    Plan:     Continue with current nursing cares. Will need SNF. Not safe for home.     Electronically signed by Lotus Verde MD on 11/1/18 at 7:15 AM

## 2018-11-02 NOTE — PROGRESS NOTES
840 ml   Output             1000 ml   Net             -160 ml         Physical Exam   Constitutional: He is oriented to person, place, and time. He appears well-developed and well-nourished. He appears ill. No distress. HENT:   Head: Normocephalic and atraumatic. Eyes: Conjunctivae are normal. Right eye exhibits no discharge. Left eye exhibits no discharge. No scleral icterus. Neck: Normal range of motion. Neck supple. No JVD present. No tracheal deviation present. Cardiovascular: Normal rate, regular rhythm and normal heart sounds. Exam reveals no gallop and no friction rub. No murmur heard. Pulmonary/Chest: Accessory muscle usage present. No respiratory distress. He has decreased breath sounds in the right lower field and the left lower field. He has no wheezes. He has no rales. He exhibits no tenderness. Abdominal: Soft. Bowel sounds are normal. He exhibits no distension and no mass. There is no tenderness. There is no rebound and no guarding. No hernia. Musculoskeletal: He exhibits no edema, tenderness or deformity. Lymphadenopathy:     He has no cervical adenopathy. Neurological: He is alert and oriented to person, place, and time. No cranial nerve deficit. Coordination normal.   Skin: Skin is warm. No rash noted. He is not diaphoretic. No erythema. No pallor. Left knee surgical incision   Psychiatric: He has a normal mood and affect. His behavior is normal. Thought content normal.   Nursing note and vitals reviewed.       BMP:   Recent Labs      11/01/18 0430 11/02/18 0110   NA  137  136   K  4.0  3.9   CL  98  98   CO2  28  28   BUN  47*  55*   CREATININE  3.4*  3.2*   GLUCOSE  155*  172*   CALCIUM  8.1*  8.3*     CBC:   Recent Labs      11/01/18 0430 11/02/18 0110   WBC  6.4  5.9   HGB  8.2*  8.0*   HCT  25.1*  24.9*   MCV  88.7  89.6   PLT  99*  85*     CMP:    Recent Labs      11/01/18 0430 11/02/18 0110   NA  137  136   K  4.0  3.9   CL  98  98   CO2 right shoulder are noted. No change. Persistent pulmonary vascular congestion and pulmonary edema. Discoid atelectasis right lower lung. No pneumothorax. The above finding are recorded on a digital voice clip in PACS. Signed by Dr Va Ramirez on 10/23/2018 7:45 AM    Xr Chest Portable    Result Date: 10/23/2018  EXAMINATION: XR CHEST PORTABLE 10/23/2018 7:17 AM HISTORY: Irregular heart rate COMPARISON: 8/20/2018 FINDINGS: The heart is magnified but felt to be normal in size. The aorta is tortuous with atherosclerotic calcifications. The pulmonary vasculature is indistinct, and there are subtle perihilar opacities bilaterally. Opacities extend into the right lung base. A layering right pleural effusion is suspected. There is no appreciable pneumothorax. Suspect pulmonary vascular congestion with right greater than left basilar atelectasis. Layering right pleural effusion suspected. Signed by Dr Dorian Freeman on 10/23/2018 7:19 AM    Cta Pulmonary W Contrast    Result Date: 10/23/2018  CTA PULMONARY W CONTRAST 10/23/2018 4:00 AM HISTORY: COMPARISON: None. DLP: 863 mGy cm TECHNIQUE: Helical tomographic images of the chest were obtained after the administration of intravenous contrast following angiogram protocol. Additionally, 3D MIP reconstructions in the coronal and sagittal planes were provided. FINDINGS:  Pulmonary arteries: There is adequate enhancement of the pulmonary arteries to evaluate for central and segmental pulmonary emboli. There are no filling defects within the main, lobar, segmental or visualized subsegmental pulmonary arteries. . Aorta and great vessels: There is atherosclerosis in the aorta. There is atherosclerosis in the great vessels without evidence of stenosis. Coronary artery calcifications are visualized. Neck base: The imaged portion of the base of the neck appears unremarkable. Lungs: The lungs are clear.  Bilateral pleural effusions are present slightly greater on the right than Karl Mullen MD        glucagon (rDNA) injection 1 mg  1 mg Intramuscular PRN Alicia Arredondo MD        dextrose 50 % solution 12.5 g  12.5 g Intravenous PRN Herma Siemens, MD        sodium chloride flush 0.9 % injection 10 mL  10 mL Intravenous 2 times per day Chris Reyes PA-C   10 mL at 11/01/18 1951    sodium chloride flush 0.9 % injection 10 mL  10 mL Intravenous PRN Chris Reyes PA-C           Allergies  Promethazine hcl     ASSESSMENT/PLAN:    S/p left total knee replacement on 10/15/2018    Coagulopathy secondary to acute liver failure and Eliquis in the context of acute kidney injury. · Last dose of Eliquis taken 10/22/2018 ( p.m. Dose)  · Admitting INR of 2.82, PT 29.8, and PTT of 38.5  · Fibrinogen 364 on 10/24/2018  · PT/INR 16.0/1.29  · PTT 32.5     Kcentra 5000 units (500 units/kg) ×1 on 10/24/2018  Vitamin K 5 mg PO ×1 on 10/24/2018    Do not recommend Lovenox due to acute kidney injury. Concern with heparin using heparin is risk for DIC. Recommend SCDs at this time for DVT prophylaxis. Thrombocytopenia- suspect consumption secondary to acute illness  · Platelet count 32,241, mildly decreased    Normocytic anemia with recent hematuria  · Hemoglobin 8.0 (s/p 1 unit of pRBCs on 10/24/2018 and 10/26/2018. · MCV 89.6    Labs on 10/16/18:  · Iron sat 15%  · Iron 37  · TIBC 244  · Vitamin B12 337   · Ferritin - >2000 on 10/24/2018     Leukocytosis with neutrophilia- RESOLVED  · Afebrile  · WBC 5.9, ANC 4.1  · Blood culture 10/23/2018- no growth x5 days  · UA 10/23/2018- negative for bacteria, positive for blood  · Chest x-ray 10/25/2018- persistent opacity right lung base, possible residual pulmonary edema, atelectasis or inflammatory process. Basilar atelectasis  · ID requested monitoring, off antibiotic therapy     Acute kidney injury  · Renal ultrasound 10/23/2018- Mild atrophy of the left kidney with mild cortical thinning. No renal mass or hydronephrosis.    · Creatinine 3.2, GFR

## 2018-11-02 NOTE — PROGRESS NOTES
History   Problem Relation Age of Onset    Colon Cancer Father     Diabetes Brother     Colon Polyps Neg Hx     Liver Cancer Neg Hx     Liver Disease Neg Hx     Esophageal Cancer Neg Hx     Rectal Cancer Neg Hx     Stomach Cancer Neg Hx        Social History  Social History     Social History    Marital status:      Spouse name: N/A    Number of children: N/A    Years of education: N/A     Occupational History    Not on file. Social History Main Topics    Smoking status: Former Smoker     Quit date: 6/3/2003    Smokeless tobacco: Never Used    Alcohol use 7.2 oz/week     12 Glasses of wine per week      Comment: 2 glasses of wine every night    Drug use: No    Sexual activity: Yes     Partners: Female     Other Topics Concern    Not on file     Social History Narrative    No narrative on file         Review of Systems:  History obtained from chart review and the patient  General ROS: No fever or chills  Respiratory ROS: No cough, shortness of breath, wheezing  Cardiovascular ROS: No chest pain or palpitations  Gastrointestinal ROS: No abdominal pain or melena  Genito-Urinary ROS: No dysuria or hematuria  Musculoskeletal ROS: No joint pain or swelling         Objective:  Blood pressure (!) 174/88, pulse 86, temperature 97.7 °F (36.5 °C), temperature source Temporal, resp. rate 18, height 6' (1.829 m), weight 266 lb 6.4 oz (120.8 kg), SpO2 98 %.     Intake/Output Summary (Last 24 hours) at 11/02/18 1207  Last data filed at 11/02/18 0915   Gross per 24 hour   Intake             1320 ml   Output             1000 ml   Net              320 ml     General: awake/alert   Chest:  clear to auscultation bilaterally without respiratory distress  CVS: regular rate and rhythm  Abdominal: soft, nontender, normal bowel sounds  Extremities: no cyanosis or edema  Skin: warm and dry without rash    Labs:  BMP:   Recent Labs      10/31/18   0425  11/01/18   0430  11/02/18   0110   NA  134*  137  136   K Urine Culture Recent : No results for input(s): LABURIN in the last 720 hours. Radiology reports as per the Radiologist  Radiology: Xr Knee Left (1-2 Views)    Result Date: 10/15/2018  Examination. XR KNEE LEFT (1-2 VIEWS) History: Postop arthroplasty. The portable frontal and crosstable laterally of the left knee are obtained. There is no previous study for comparison. There is evidence of a knee arthroplasty. There is normal alignment of the hardware components. No complication. There is soft tissue air around the knee. No acute bony abnormality. A superficial femoral and popliteal artery graft is seen in place. Severe atheromatous changes of the distal popliteal and tibial arteries are seen. A normal left knee arthroplasty. Signed by Dr Evelyn Wilcox on 10/15/2018 1:19 PM    Us Renal Complete    Result Date: 10/23/2018  EXAMINATION: US RENAL COMPLETE 10/23/2018 5:53 PM HISTORY: Acute renal failure. Gross hematuria. Report: Sonographic images of the kidneys were obtained, comparison is made with the previous ultrasound 10/17/2018. The right kidney measures 12.0 x 4.9 x 4.7 cm, at the inferior pole, there is a benign-appearing cyst that measures 2.8 x 2.2 x 2.0 cm. This appears stable. Also at the inferior pole on the right there is a small hypoechoic nodule, probable cyst that measures 9 mm, projecting from the inferior pole cortex. This is not clearly seen on the previous ultrasound. Color flow images demonstrate vascular flow within the right kidney. Renal cortical echogenicity is within normal limits. There is no hydronephrosis on the right. The left kidney measures 10.0 x 5.4 x 4.4 cm and has normal cortical echogenicity, mild cortical thinning is present. No mass or hydronephrosis is identified. Color flow imaging demonstrates vascular flow within the left kidney. The bladder is decompressed by a Shultz catheter. 1. Mild atrophy of the left kidney with mild cortical thinning.  No renal mass or reviewed and reported by stat rads. I do not find any discrepancies. Signed by Dr Cindy Ramirez on 10/23/2018 7:38 AM    Xr Chest Portable    Result Date: 10/25/2018  XR CHEST PORTABLE 10/25/2018 12:30 PM HISTORY: [facility] catheter placement COMPARISON: October 24, 2018. FINDINGS: The left lung is clear. There is a persistent opacity in the right lung base. This may be residual pulmonary edema, possibly atelectasis or an inflammatory process. . Cardiac silhouette is mildly enlarged. A right PIC catheter is present with the distal tip satisfactorily positioned in the superior vena cava. . The osseous structures and surrounding soft tissues demonstrate no acute abnormality. 1. Persistent opacity in the right lung base. This may be residual pulmonary edema, possibly atelectasis or an inflammatory process this is unchanged from October 24. Signed by Dr Tamara Hand on 10/25/2018 2:01 PM    Xr Chest Portable    Result Date: 10/24/2018  EXAMINATION: XR CHEST PORTABLE 10/24/2018 8:12 AM HISTORY: Pleural effusion, possible pneumonia COMPARISON: 10/23/2018 FINDINGS: The heart and mediastinal contours are stable. The aorta is tortuous with atherosclerotic calcifications. The pulmonary vasculature remains somewhat indistinct, though there has been improvement in the bilateral perihilar opacities. Airspace opacities persist at the lung bases, possibly atelectasis. There is no appreciable pneumothorax. Improving pulmonary edema and pulmonary vascular congestion. Bibasilar atelectasis. Signed by Dr Alcon King on 10/24/2018 8:14 AM    Xr Chest Portable    Result Date: 10/23/2018  Examination. XR CHEST PORTABLE History: CVC insertion. A frontal portable upright view of the chest is compared with the previous study obtained earlier today. No interval change. There is persistent pulmonary vascular congestion and pulmonary edema. There are atelectatic changes in the right lower lung. There is moderate cardiomegaly.  Atheromatous clear. Bilateral pleural effusions are present slightly greater on the right than the left. Bibasilar atelectasis is also noted. The trachea and bronchial tree are patent. Heart: The heart is normal in size. There is no pericardial effusion. Lymph nodes: No pathologically enlarged mediastinal, hilar, or axillary lymph nodes are present. Bones and soft tissues: The osseous structures of the thorax and surrounding soft tissues demonstrate no acute process. Upper abdomen: The imaged portion of the upper abdomen demonstrates no acute process. 1. No evidence of embolic disease. 2. Bilateral pleural effusions are noted. 3. Bibasilar dependent atelectasis.  Signed by Dr Tony Purcell on 10/23/2018 7:39 AM       Assessment   ANA / ATN  Hyperkalemia  Metabolic acidosis  UTI with septic shock  Shock liver  Anemia  Hyponatremia  Elevated troponin    Plan:  HD MWF prn  D/w pt/RN  Continue to monitor for recovery    Leda Salgado MD  11/02/18  12:07 PM

## 2018-11-02 NOTE — DISCHARGE SUMMARY
medications as directed. Resume activity as tolerated. Diet: DIET RENAL; Carb Control: 4 carb choices (60 gms)/meal     Disposition: Patient is medically stable and will be discharged to SNF. Time spent on discharge less than 30 minutes.     Signed:  Pineda Whittington DO

## 2018-11-05 ENCOUNTER — APPOINTMENT (OUTPATIENT)
Dept: GENERAL RADIOLOGY | Age: 78
DRG: 813 | End: 2018-11-05
Payer: MEDICARE

## 2018-11-05 ENCOUNTER — HOSPITAL ENCOUNTER (INPATIENT)
Age: 78
LOS: 3 days | Discharge: NURSING, REHAB OR LONG-TERM CARE | DRG: 813 | End: 2018-11-08
Attending: EMERGENCY MEDICINE | Admitting: INTERNAL MEDICINE
Payer: MEDICARE

## 2018-11-05 DIAGNOSIS — D64.9 ANEMIA, UNSPECIFIED TYPE: ICD-10-CM

## 2018-11-05 DIAGNOSIS — N28.9 RENAL INSUFFICIENCY: ICD-10-CM

## 2018-11-05 DIAGNOSIS — K92.1 BLOOD IN STOOL: Primary | ICD-10-CM

## 2018-11-05 DIAGNOSIS — R04.0 EPISTAXIS: ICD-10-CM

## 2018-11-05 PROBLEM — D69.9 BLEEDING DIATHESIS (HCC): Status: ACTIVE | Noted: 2018-11-05

## 2018-11-05 LAB
ABO/RH: NORMAL
ALBUMIN SERPL-MCNC: 2.8 G/DL (ref 3.5–5.2)
ALBUMIN SERPL-MCNC: 3 G/DL (ref 3.5–5.2)
ALP BLD-CCNC: 287 U/L (ref 40–130)
ALP BLD-CCNC: 308 U/L (ref 40–130)
ALT SERPL-CCNC: 170 U/L (ref 5–41)
ALT SERPL-CCNC: 193 U/L (ref 5–41)
ANION GAP SERPL CALCULATED.3IONS-SCNC: 10 MMOL/L (ref 7–19)
ANION GAP SERPL CALCULATED.3IONS-SCNC: 11 MMOL/L (ref 7–19)
ANTIBODY SCREEN: NORMAL
APTT: 29.3 SEC (ref 26–36.2)
AST SERPL-CCNC: 63 U/L (ref 5–40)
AST SERPL-CCNC: 74 U/L (ref 5–40)
BASOPHILS ABSOLUTE: 0 K/UL (ref 0–0.2)
BASOPHILS ABSOLUTE: 0 K/UL (ref 0–0.2)
BASOPHILS RELATIVE PERCENT: 0.2 % (ref 0–1)
BASOPHILS RELATIVE PERCENT: 0.3 % (ref 0–1)
BILIRUB SERPL-MCNC: 2.6 MG/DL (ref 0.2–1.2)
BILIRUB SERPL-MCNC: 3.1 MG/DL (ref 0.2–1.2)
BUN BLDV-MCNC: 44 MG/DL (ref 8–23)
BUN BLDV-MCNC: 45 MG/DL (ref 8–23)
CALCIUM SERPL-MCNC: 8.1 MG/DL (ref 8.8–10.2)
CALCIUM SERPL-MCNC: 8.7 MG/DL (ref 8.8–10.2)
CHLORIDE BLD-SCNC: 103 MMOL/L (ref 98–111)
CHLORIDE BLD-SCNC: 104 MMOL/L (ref 98–111)
CO2: 26 MMOL/L (ref 22–29)
CO2: 29 MMOL/L (ref 22–29)
CREAT SERPL-MCNC: 2.3 MG/DL (ref 0.5–1.2)
CREAT SERPL-MCNC: 2.4 MG/DL (ref 0.5–1.2)
EOSINOPHILS ABSOLUTE: 0 K/UL (ref 0–0.6)
EOSINOPHILS ABSOLUTE: 0 K/UL (ref 0–0.6)
EOSINOPHILS RELATIVE PERCENT: 0 % (ref 0–5)
EOSINOPHILS RELATIVE PERCENT: 0 % (ref 0–5)
FIBRINOGEN: 359 MG/DL (ref 238–505)
GFR NON-AFRICAN AMERICAN: 26
GFR NON-AFRICAN AMERICAN: 28
GLUCOSE BLD-MCNC: 185 MG/DL (ref 74–109)
GLUCOSE BLD-MCNC: 227 MG/DL (ref 74–109)
HCT VFR BLD CALC: 23.8 % (ref 42–52)
HCT VFR BLD CALC: 24.5 % (ref 42–52)
HEMOGLOBIN: 7.5 G/DL (ref 14–18)
HEMOGLOBIN: 7.8 G/DL (ref 14–18)
INR BLD: 1.29 (ref 0.88–1.18)
LYMPHOCYTES ABSOLUTE: 1 K/UL (ref 1.1–4.5)
LYMPHOCYTES ABSOLUTE: 1.4 K/UL (ref 1.1–4.5)
LYMPHOCYTES RELATIVE PERCENT: 13.5 % (ref 20–40)
LYMPHOCYTES RELATIVE PERCENT: 9.8 % (ref 20–40)
MCH RBC QN AUTO: 29.2 PG (ref 27–31)
MCH RBC QN AUTO: 29.3 PG (ref 27–31)
MCHC RBC AUTO-ENTMCNC: 31.5 G/DL (ref 33–37)
MCHC RBC AUTO-ENTMCNC: 31.8 G/DL (ref 33–37)
MCV RBC AUTO: 92.1 FL (ref 80–94)
MCV RBC AUTO: 92.6 FL (ref 80–94)
MONOCYTES ABSOLUTE: 1.1 K/UL (ref 0–0.9)
MONOCYTES ABSOLUTE: 1.1 K/UL (ref 0–0.9)
MONOCYTES RELATIVE PERCENT: 10.6 % (ref 0–10)
MONOCYTES RELATIVE PERCENT: 11 % (ref 0–10)
NEUTROPHILS ABSOLUTE: 7.7 K/UL (ref 1.5–7.5)
NEUTROPHILS ABSOLUTE: 8.2 K/UL (ref 1.5–7.5)
NEUTROPHILS RELATIVE PERCENT: 74.2 % (ref 50–65)
NEUTROPHILS RELATIVE PERCENT: 78.5 % (ref 50–65)
PDW BLD-RTO: 17.7 % (ref 11.5–14.5)
PDW BLD-RTO: 17.8 % (ref 11.5–14.5)
PLATELET # BLD: 125 K/UL (ref 130–400)
PLATELET # BLD: 146 K/UL (ref 130–400)
PMV BLD AUTO: 10.7 FL (ref 9.4–12.4)
PMV BLD AUTO: 11.2 FL (ref 9.4–12.4)
POTASSIUM SERPL-SCNC: 3.9 MMOL/L (ref 3.5–5)
POTASSIUM SERPL-SCNC: 4.7 MMOL/L (ref 3.5–5)
PROTHROMBIN TIME: 16 SEC (ref 12–14.6)
RBC # BLD: 2.57 M/UL (ref 4.7–6.1)
RBC # BLD: 2.66 M/UL (ref 4.7–6.1)
SODIUM BLD-SCNC: 141 MMOL/L (ref 136–145)
SODIUM BLD-SCNC: 142 MMOL/L (ref 136–145)
TOTAL PROTEIN: 4.6 G/DL (ref 6.6–8.7)
TOTAL PROTEIN: 5.4 G/DL (ref 6.6–8.7)
WBC # BLD: 10.3 K/UL (ref 4.8–10.8)
WBC # BLD: 10.4 K/UL (ref 4.8–10.8)

## 2018-11-05 PROCEDURE — 2580000003 HC RX 258: Performed by: INTERNAL MEDICINE

## 2018-11-05 PROCEDURE — 85610 PROTHROMBIN TIME: CPT

## 2018-11-05 PROCEDURE — 1210000000 HC MED SURG R&B

## 2018-11-05 PROCEDURE — 86901 BLOOD TYPING SEROLOGIC RH(D): CPT

## 2018-11-05 PROCEDURE — 85025 COMPLETE CBC W/AUTO DIFF WBC: CPT

## 2018-11-05 PROCEDURE — 6370000000 HC RX 637 (ALT 250 FOR IP): Performed by: EMERGENCY MEDICINE

## 2018-11-05 PROCEDURE — 85730 THROMBOPLASTIN TIME PARTIAL: CPT

## 2018-11-05 PROCEDURE — 93005 ELECTROCARDIOGRAM TRACING: CPT

## 2018-11-05 PROCEDURE — 86850 RBC ANTIBODY SCREEN: CPT

## 2018-11-05 PROCEDURE — 30901 CONTROL OF NOSEBLEED: CPT

## 2018-11-05 PROCEDURE — 6370000000 HC RX 637 (ALT 250 FOR IP): Performed by: INTERNAL MEDICINE

## 2018-11-05 PROCEDURE — 36415 COLL VENOUS BLD VENIPUNCTURE: CPT

## 2018-11-05 PROCEDURE — 80053 COMPREHEN METABOLIC PANEL: CPT

## 2018-11-05 PROCEDURE — 86900 BLOOD TYPING SEROLOGIC ABO: CPT

## 2018-11-05 PROCEDURE — 51702 INSERT TEMP BLADDER CATH: CPT

## 2018-11-05 PROCEDURE — 2700000000 HC OXYGEN THERAPY PER DAY

## 2018-11-05 PROCEDURE — 5A1D70Z PERFORMANCE OF URINARY FILTRATION, INTERMITTENT, LESS THAN 6 HOURS PER DAY: ICD-10-PCS | Performed by: INTERNAL MEDICINE

## 2018-11-05 PROCEDURE — 2Y41X5Z PACKING OF NASAL REGION USING PACKING MATERIAL: ICD-10-PCS | Performed by: EMERGENCY MEDICINE

## 2018-11-05 PROCEDURE — 99285 EMERGENCY DEPT VISIT HI MDM: CPT | Performed by: EMERGENCY MEDICINE

## 2018-11-05 PROCEDURE — 85384 FIBRINOGEN ACTIVITY: CPT

## 2018-11-05 PROCEDURE — 99223 1ST HOSP IP/OBS HIGH 75: CPT | Performed by: INTERNAL MEDICINE

## 2018-11-05 PROCEDURE — 8010000000 HC HEMODIALYSIS ACUTE INPT

## 2018-11-05 PROCEDURE — 99285 EMERGENCY DEPT VISIT HI MDM: CPT

## 2018-11-05 PROCEDURE — 30901 CONTROL OF NOSEBLEED: CPT | Performed by: EMERGENCY MEDICINE

## 2018-11-05 PROCEDURE — 71045 X-RAY EXAM CHEST 1 VIEW: CPT

## 2018-11-05 RX ORDER — SODIUM CHLORIDE 0.9 % (FLUSH) 0.9 %
10 SYRINGE (ML) INJECTION EVERY 12 HOURS SCHEDULED
Status: DISCONTINUED | OUTPATIENT
Start: 2018-11-05 | End: 2018-11-08 | Stop reason: HOSPADM

## 2018-11-05 RX ORDER — TAMSULOSIN HYDROCHLORIDE 0.4 MG/1
0.4 CAPSULE ORAL DAILY
Status: DISCONTINUED | OUTPATIENT
Start: 2018-11-05 | End: 2018-11-08 | Stop reason: HOSPADM

## 2018-11-05 RX ORDER — PANTOPRAZOLE SODIUM 40 MG/1
40 TABLET, DELAYED RELEASE ORAL DAILY
Status: DISCONTINUED | OUTPATIENT
Start: 2018-11-05 | End: 2018-11-08 | Stop reason: HOSPADM

## 2018-11-05 RX ORDER — METOPROLOL SUCCINATE 50 MG/1
100 TABLET, EXTENDED RELEASE ORAL DAILY
Status: DISCONTINUED | OUTPATIENT
Start: 2018-11-05 | End: 2018-11-08 | Stop reason: HOSPADM

## 2018-11-05 RX ORDER — OXYMETAZOLINE HYDROCHLORIDE 0.05 G/100ML
2 SPRAY NASAL ONCE
Status: COMPLETED | OUTPATIENT
Start: 2018-11-05 | End: 2018-11-05

## 2018-11-05 RX ORDER — OXYCODONE HYDROCHLORIDE 5 MG/1
10 TABLET ORAL SEE ADMIN INSTRUCTIONS
Status: DISCONTINUED | OUTPATIENT
Start: 2018-11-05 | End: 2018-11-05

## 2018-11-05 RX ORDER — SODIUM CHLORIDE 0.9 % (FLUSH) 0.9 %
10 SYRINGE (ML) INJECTION PRN
Status: DISCONTINUED | OUTPATIENT
Start: 2018-11-05 | End: 2018-11-08 | Stop reason: HOSPADM

## 2018-11-05 RX ORDER — ACETAMINOPHEN 325 MG/1
650 TABLET ORAL EVERY 8 HOURS PRN
Status: DISCONTINUED | OUTPATIENT
Start: 2018-11-05 | End: 2018-11-08 | Stop reason: HOSPADM

## 2018-11-05 RX ADMIN — TAMSULOSIN HYDROCHLORIDE 0.4 MG: 0.4 CAPSULE ORAL at 08:55

## 2018-11-05 RX ADMIN — METOPROLOL SUCCINATE 100 MG: 50 TABLET, EXTENDED RELEASE ORAL at 08:55

## 2018-11-05 RX ADMIN — Medication 10 ML: at 21:02

## 2018-11-05 RX ADMIN — PANTOPRAZOLE SODIUM 40 MG: 40 TABLET, DELAYED RELEASE ORAL at 08:55

## 2018-11-05 RX ADMIN — Medication 10 ML: at 08:56

## 2018-11-05 RX ADMIN — Medication 2 EACH: at 02:57

## 2018-11-05 RX ADMIN — OXYMETAZOLINE HYDROCHLORIDE 2 SPRAY: 5 SPRAY NASAL at 02:57

## 2018-11-05 ASSESSMENT — ENCOUNTER SYMPTOMS
BLOOD IN STOOL: 1
BLOOD IN STOOL: 0
EYE DISCHARGE: 0
VOMITING: 0
COUGH: 1
COUGH: 0
BACK PAIN: 0
SHORTNESS OF BREATH: 1
EYES NEGATIVE: 1
EYE REDNESS: 0
ABDOMINAL PAIN: 0
WHEEZING: 0
GASTROINTESTINAL NEGATIVE: 1
EYE PAIN: 0
SORE THROAT: 0
DIARRHEA: 0
NAUSEA: 0
SHORTNESS OF BREATH: 0
CONSTIPATION: 0

## 2018-11-05 NOTE — PROGRESS NOTES
Pt arrived back to room 331 from HD. 1.8 liters removed. Patient's dressing clean dry and intact, Doc Wright in dialysis stated she had to use quick clot and change the dressing twice. Patient has no complaints at this time.  EKG notified that patient was back to room to obtain EKG

## 2018-11-05 NOTE — CONSULTS
pain, + worsening of edema  Respiratory:  No hemoptysis, no stidor  Gastrointestinal:  No blood in stool, no n/v, no diarrhea  Genitoruinary:  No hematuria, + difficulty with urination  Musculoskeletal:  No joint swelling, no redness  Integumentary:  No Rash, no itching  Neurological:  No focal weakness, No new sensory deficit  Psychiatric:  No depression, no confusion  Endocrine:  No polyuria, no polydipsia       Medications     Current Facility-Administered Medications:   sodium chloride flush 0.9 % injection 10 mL, 10 mL, Intravenous, 2 times per day, Kasandra Thakur MD, 10 mL at 11/05/18 0856  sodium chloride flush 0.9 % injection 10 mL, 10 mL, Intravenous, PRN, Zaire Richardson MD  acetaminophen (TYLENOL) tablet 650 mg, 650 mg, Oral, Q8H PRN, Zaire Richardson MD  metoprolol succinate (TOPROL XL) extended release tablet 100 mg, 100 mg, Oral, Daily, Zaire Richardson MD, 100 mg at 11/05/18 0855  pantoprazole (PROTONIX) tablet 40 mg, 40 mg, Oral, Daily, Zaire Richardson MD, 40 mg at 11/05/18 0855  tamsulosin (FLOMAX) capsule 0.4 mg, 0.4 mg, Oral, Daily, Kasandra Thakur MD, 0.4 mg at 11/05/18 1926  Outpatient Prescriptions Marked as Taking for the 11/5/18 encounter [facility] Encounter):  polyethylene glycol (GLYCOLAX) packet, Take 17 g by mouth daily, Disp: 527 g, Rfl: 0  tamsulosin (FLOMAX) 0.4 MG capsule, Take 1 capsule by mouth daily, Disp: 30 capsule, Rfl: 0  oxyCODONE HCl (OXY-IR) 10 MG immediate release tablet, Take 1 tablet by mouth See Admin Instructions for 3 days.  1 to 2 tablets po q 3 to 4 hours prn., Disp: 18 tablet, Rfl: 0  ondansetron (ZOFRAN) 4 MG tablet, Take 1 tablet by mouth every 8 hours as needed for Nausea or Vomiting, Disp: 30 tablet, Rfl: 0  aspirin 81 MG tablet, Take 81 mg by mouth daily, Disp: , Rfl:   atorvastatin (LIPITOR) 20 MG tablet, TK 1 T PO QD, Disp: , Rfl: 1  losartan-hydrochlorothiazide (HYZAAR) 100-25 MG per tablet, TK 1 T PO QD, Disp: , Rfl: 3  metoprolol succinate

## 2018-11-05 NOTE — CONSULTS
equistream xk 23cm tip to cuff     Medications Prior to Admission:    Prescriptions Prior to Admission: polyethylene glycol (GLYCOLAX) packet, Take 17 g by mouth daily  tamsulosin (FLOMAX) 0.4 MG capsule, Take 1 capsule by mouth daily  oxyCODONE HCl (OXY-IR) 10 MG immediate release tablet, Take 1 tablet by mouth See Admin Instructions for 3 days. 1 to 2 tablets po q 3 to 4 hours prn.  ondansetron (ZOFRAN) 4 MG tablet, Take 1 tablet by mouth every 8 hours as needed for Nausea or Vomiting  aspirin 81 MG tablet, Take 81 mg by mouth daily  atorvastatin (LIPITOR) 20 MG tablet, TK 1 T PO QD  losartan-hydrochlorothiazide (HYZAAR) 100-25 MG per tablet, TK 1 T PO QD  metoprolol succinate (TOPROL XL) 100 MG extended release tablet, Take 100 mg by mouth daily  NIFEdipine (PROCARDIA XL) 60 MG extended release tablet, Take 60 mg by mouth daily  pantoprazole (PROTONIX) 40 MG tablet, Take 40 mg by mouth daily  polyethylene glycol (GLYCOLAX) packet, 3 times daily on Sunday. Current Meds:  Scheduled Meds:   sodium chloride flush  10 mL Intravenous 2 times per day    metoprolol succinate  100 mg Oral Daily    pantoprazole  40 mg Oral Daily    tamsulosin  0.4 mg Oral Daily       ContinuousInfusions:  PRN Meds:sodium chloride flush, acetaminophen    Allergies:    Promethazine hcl    Social History:    reports that he quit smoking about 15 years ago. He has never used smokeless tobacco. He reports that he drinks about 7.2 oz of alcohol per week . He reports that he does not use drugs.     Family History:   family history includes Colon Cancer in his father; Diabetes in his brother. Review of Systems   Constitutional: Positive for fatigue. Negative for chills, diaphoresis and fever. HENT: Negative. Negative for congestion, ear pain, hearing loss, nosebleeds, sore throat and tinnitus. Eyes: Negative. Negative for pain, discharge and redness. Respiratory: Positive for cough and shortness of breath.  Negative for

## 2018-11-05 NOTE — H&P
discontinued, will monitor off.  10/30: Continue to work with PT/OT. Permacath placed. Patient continues to improve. LFTs trending down. Will need to discuss with subspecialties regarding discharge planning if not other acute interventions planned. 10/31: Patient remains stable, afebrile off abx. Continue d/c planning. 11-1: Awaiting HD chair time for discharge to SNF for rehab.  11-2: Hemodialysis set up for outpatient. Patient seen and examined in dialysis chair, doing well and eager for discharge to [facility]. No anticoagulation recommended by hematology, SCDs ordered upon discharge for DVT prophylaxis. Review of Systems   Constitutional: Negative for chills, fever and weight loss. HENT: Positive for nosebleeds. Respiratory:        Chronic shortness of breath   Cardiovascular: Positive for leg swelling. Negative for chest pain, palpitations and PND. Gastrointestinal: Negative for abdominal pain, diarrhea, nausea and vomiting. Genitourinary: Negative. Musculoskeletal:        Edema and weight gain   Neurological: Negative for dizziness, tingling and headaches. Endo/Heme/Allergies: Bruises/bleeds easily. Psychiatric/Behavioral: Negative. Physical Exam   Constitutional: He is oriented to person, place, and time. He appears well-developed and well-nourished. Non-toxic appearance. He has a sickly appearance. He does not appear ill. No distress. HENT:   Head: Normocephalic and atraumatic. Eyes: Pupils are equal, round, and reactive to light. Conjunctivae and lids are normal.   Neck: Neck supple. No JVD present. Carotid bruit is not present. Cardiovascular: Normal rate and regular rhythm. Exam reveals no S3. Pulses:       Carotid pulses are 2+ on the right side, and 2+ on the left side. Radial pulses are 2+ on the right side, and 2+ on the left side. Pulmonary/Chest: He has decreased breath sounds in the right lower field and the left lower field.  He has no

## 2018-11-06 PROBLEM — R04.0 EPISTAXIS: Status: ACTIVE | Noted: 2018-11-06

## 2018-11-06 PROBLEM — D62 ACUTE BLOOD LOSS ANEMIA: Status: ACTIVE | Noted: 2018-11-06

## 2018-11-06 PROBLEM — E83.51 HYPOCALCEMIA: Status: ACTIVE | Noted: 2018-11-06

## 2018-11-06 PROBLEM — D69.6 THROMBOCYTOPENIA (HCC): Status: ACTIVE | Noted: 2018-11-06

## 2018-11-06 PROBLEM — D69.6 THROMBOCYTOPENIA (HCC): Status: RESOLVED | Noted: 2018-11-06 | Resolved: 2018-11-06

## 2018-11-06 PROBLEM — K92.1 MELENA: Status: ACTIVE | Noted: 2018-11-06

## 2018-11-06 PROBLEM — J18.9 PNEUMONIA DUE TO INFECTIOUS ORGANISM: Status: ACTIVE | Noted: 2018-11-06

## 2018-11-06 LAB
BASOPHILS ABSOLUTE: 0 K/UL (ref 0–0.2)
BASOPHILS RELATIVE PERCENT: 0.3 % (ref 0–1)
BLOOD BANK DISPENSE STATUS: NORMAL
BLOOD BANK PRODUCT CODE: NORMAL
BPU ID: NORMAL
DESCRIPTION BLOOD BANK: NORMAL
EKG P AXIS: 16 DEGREES
EKG P-R INTERVAL: 184 MS
EKG Q-T INTERVAL: 400 MS
EKG QRS DURATION: 108 MS
EKG QTC CALCULATION (BAZETT): 424 MS
EKG T AXIS: 72 DEGREES
EOSINOPHILS ABSOLUTE: 0 K/UL (ref 0–0.6)
EOSINOPHILS RELATIVE PERCENT: 0 % (ref 0–5)
HCT VFR BLD CALC: 21.9 % (ref 42–52)
HEMOGLOBIN: 7 G/DL (ref 14–18)
LYMPHOCYTES ABSOLUTE: 1.3 K/UL (ref 1.1–4.5)
LYMPHOCYTES RELATIVE PERCENT: 15.1 % (ref 20–40)
MCH RBC QN AUTO: 29.7 PG (ref 27–31)
MCHC RBC AUTO-ENTMCNC: 32 G/DL (ref 33–37)
MCV RBC AUTO: 92.8 FL (ref 80–94)
MONOCYTES ABSOLUTE: 1.1 K/UL (ref 0–0.9)
MONOCYTES RELATIVE PERCENT: 11.9 % (ref 0–10)
NEUTROPHILS ABSOLUTE: 6.3 K/UL (ref 1.5–7.5)
NEUTROPHILS RELATIVE PERCENT: 71.6 % (ref 50–65)
OCCULT BLOOD QC: ABNORMAL
OCCULT BLOOD SCREENING: ABNORMAL
PDW BLD-RTO: 17.9 % (ref 11.5–14.5)
PLATELET # BLD: 136 K/UL (ref 130–400)
PMV BLD AUTO: 12 FL (ref 9.4–12.4)
RBC # BLD: 2.36 M/UL (ref 4.7–6.1)
WBC # BLD: 8.9 K/UL (ref 4.8–10.8)

## 2018-11-06 PROCEDURE — 99233 SBSQ HOSP IP/OBS HIGH 50: CPT | Performed by: FAMILY MEDICINE

## 2018-11-06 PROCEDURE — 2580000003 HC RX 258: Performed by: INTERNAL MEDICINE

## 2018-11-06 PROCEDURE — 1210000000 HC MED SURG R&B

## 2018-11-06 PROCEDURE — 36415 COLL VENOUS BLD VENIPUNCTURE: CPT

## 2018-11-06 PROCEDURE — G0328 FECAL BLOOD SCRN IMMUNOASSAY: HCPCS

## 2018-11-06 PROCEDURE — 2580000003 HC RX 258: Performed by: FAMILY MEDICINE

## 2018-11-06 PROCEDURE — 36430 TRANSFUSION BLD/BLD COMPNT: CPT

## 2018-11-06 PROCEDURE — 2700000000 HC OXYGEN THERAPY PER DAY

## 2018-11-06 PROCEDURE — P9016 RBC LEUKOCYTES REDUCED: HCPCS

## 2018-11-06 PROCEDURE — 6360000002 HC RX W HCPCS: Performed by: FAMILY MEDICINE

## 2018-11-06 PROCEDURE — 85025 COMPLETE CBC W/AUTO DIFF WBC: CPT

## 2018-11-06 PROCEDURE — 6370000000 HC RX 637 (ALT 250 FOR IP): Performed by: INTERNAL MEDICINE

## 2018-11-06 RX ORDER — 0.9 % SODIUM CHLORIDE 0.9 %
250 INTRAVENOUS SOLUTION INTRAVENOUS ONCE
Status: DISCONTINUED | OUTPATIENT
Start: 2018-11-06 | End: 2018-11-08 | Stop reason: HOSPADM

## 2018-11-06 RX ADMIN — Medication 10 ML: at 07:42

## 2018-11-06 RX ADMIN — METOPROLOL SUCCINATE 100 MG: 50 TABLET, EXTENDED RELEASE ORAL at 07:41

## 2018-11-06 RX ADMIN — CEFEPIME HYDROCHLORIDE 2 G: 2 INJECTION, POWDER, FOR SOLUTION INTRAVENOUS at 21:59

## 2018-11-06 RX ADMIN — CEFEPIME HYDROCHLORIDE 2 G: 2 INJECTION, POWDER, FOR SOLUTION INTRAVENOUS at 13:51

## 2018-11-06 RX ADMIN — TAMSULOSIN HYDROCHLORIDE 0.4 MG: 0.4 CAPSULE ORAL at 07:41

## 2018-11-06 RX ADMIN — PANTOPRAZOLE SODIUM 40 MG: 40 TABLET, DELAYED RELEASE ORAL at 07:41

## 2018-11-06 RX ADMIN — Medication 10 ML: at 21:59

## 2018-11-06 ASSESSMENT — ENCOUNTER SYMPTOMS
WHEEZING: 0
EYES NEGATIVE: 1
DIARRHEA: 0
CONSTIPATION: 0
SHORTNESS OF BREATH: 1
SORE THROAT: 0
VOMITING: 0
COUGH: 1
EYE PAIN: 0
EYE DISCHARGE: 0
GASTROINTESTINAL NEGATIVE: 1
NAUSEA: 0
EYE REDNESS: 0
BACK PAIN: 0
BLOOD IN STOOL: 0
ABDOMINAL PAIN: 0

## 2018-11-06 NOTE — PROGRESS NOTES
Hospitalist Progress Note  11/6/2018 10:15 AM  Subjective:   Admit Date: 11/5/2018  PCP: Mike Sauceda MD    Chief Complaint: epistaxis, dialysis catheter bleeding    Subjective: Patient has continued to have some maroon fluid oozing from his nasal packing and from his dialysis catheter, but it was slowed down considerably and seems to clot quickly. Feels stronger. Wants to get out of the hospital as quickly as possible. Admitted to melena and some hematochezia, GI has been consulted for endoscopy. History of diverticulosis, with diverticulitis one year ago. History is otherwise unchanged. Cumulative Hospital History:   11-5: Sent to ED from SNF after recent discharge with sepsis due to epistaxis and bleeding from dialysis catheter. Nare packed in ED, admitted for observation for monitoring of blood counts. Dialysis patient, MWF. Converted to inpatient admission. Hem/onc evaluated. 11-6: GI consulted for melena/hematochezia. Transfuse 1 unit PRBCs due to 7.0 g hemoglobin. Cefepime for hospital-acquired pneumonia. ROS: 14 point review of systems is negative except as specifically addressed above. DIET CARB CONTROL;     Intake/Output Summary (Last 24 hours) at 11/06/18 1015  Last data filed at 11/06/18 2513   Gross per 24 hour   Intake              600 ml   Output             2300 ml   Net            -1700 ml     Medications:  Current Facility-Administered Medications   Medication Dose Route Frequency Provider Last Rate Last Dose    0.9 % sodium chloride infusion 250 mL  250 mL Intravenous Once ELISABETH Tolbert        ceFEPIme (MAXIPIME) 2 g in sterile water 20 mL IV syringe  2 g Intravenous Q12H Thang Orantes,         sodium chloride flush 0.9 % injection 10 mL  10 mL Intravenous 2 times per day Marcial Flores MD   10 mL at 11/06/18 0742    sodium chloride flush 0.9 % injection 10 mL  10 mL Intravenous PRN Marcial Flores MD        acetaminophen (TYLENOL) tablet 650 mg  650 mg Oral Q8H PRN Zaire EDWARDS exam  HEENT: atraumatic, eyes with clear conjunctiva and normal lids, pupils and irises normal, left nare packed, lips normal.  Neck without masses, lympadenopathy, bruit, thyroid normal  Lungs: no increased work of breathing, diminished breath sounds bibasilar  Heart: regular rate and rhythm, S1, S2 normal, no murmur, click, rub or gallop  Abdomen: soft, non-tender; bowel sounds normal; no masses,  no organomegaly  Extremities: extremities normal, atraumatic, no cyanosis or edema  Neurologic: No focal neurologic deficits, normal sensation, alert and oriented, affect and mood appropriate. Skin: no rashes, nodules. Assessment and Plan:   Principal Problem:    Bleeding diathesis ([address]  Active Problems:    Essential hypertension    CKD (chronic kidney disease), stage II    Acute kidney injury ([address]    Shock liver    Epistaxis    Acute blood loss anemia    Melena    Hypocalcemia    Pneumonia due to infectious organism  Resolved Problems: Thrombocytopenia ([address]    GI consult for GI bleeding. Receiving 1 unit PRBCs today. Remove nasal packing this afternoon if no visible bleeding. Cefepime 2g q12h (Renal dosing) for empiric treatment of hospital-acquired pneumonia.     Advance Directive: Full Code    DVT prophylaxis: SCDs    Discharge planning: Back to SNF      Faustino Manuel DO  Rounding Hospitalist

## 2018-11-06 NOTE — CONSULTS
Inpatient consult to GI  Consult performed by: Anthony Fuentes  Consult ordered by: Lynn JOSEPH          GI Consult Note    Pt Name: Alfonso Aguilar  MRN: [id number]  [id number]  YOB: 1940  Admit Date: 11/5/2018  2:20 AM  Date of evaluation: 11/6/2018  Primary Care Physician: Niurka Turpin MD   [id number]       CC:  GI bleed, anemia    HPI: 78yr male with a complicated recent history of knee surgery, sepsis, acute liver injury, and epistaxis. Pt as a long family history of epistaxis with multiple family members requiring surgical intervention to prevent further bleeding episodes. The patient also admits to having bleeding at his dialysis catheter and hematuria as well. GI was consulted after the pt began to have darker stools. Per the patient, he has not had BRBPR and on today's BM there was now BRBPR. Prior to the consult, the patient also had thrombocytopenia and a question of poor platelet function. Pt has had a colonoscopy    Last Colonoscopy:  9/11/2017 - Diverticulosis, small internal hemorrhoids    Past Medical History:        Diagnosis Date    Acute liver failure without hepatic coma 10/23/2018    Back pain     \"with tired legs as a result\"    Blood circulation, collateral     Carotid arterial disease ([facility]     recent surgery    CHF (congestive heart failure) ([facility] 10/23/2018    CKD (chronic kidney disease), 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/2018    GERD (gastroesophageal reflux disease)     Hyperlipidemia     Hypertension     Hypertension     Palliative care patient 10/23/2018    Pneumonia due to infectious organism 11/6/2018    Primary osteoarthritis of left knee 10/14/2018    PVD (peripheral vascular disease) [facility])      Past Surgical History:        Procedure Laterality Date    BACK SURGERY      COLONOSCOPY  2007?     [place] COLONOSCOPY FLX DX W/COLLJ SPEC WHEN PFRMD N/A 9/11/2017    Dr Ariela Castañeda internal hemorrhoids, diverticular disease-HP-No recall (age)   [facility] JAYLEN/CARPAL [] syncope, [] dyspnea on exertion, [] orthopnea  RESPIRATORY: [] SOB, [] cough, [] wheezing, [] hemoptysis  GI: [x] dark stools, [] bloody stools, [] BRBPR, [] abdominal pain, [] GERD like symptoms, [x] nausea, [] vomiting, [] hematemesis, [] jaundice, [] constipation, [] diarrhea, [] hemorrhoids, [] change in bowel habits, [] bowel incontinence  : [] Dysuria, [] urgency, [] frequency, [x] change in urine color, [] discharge; +hematuria  MUSCULOSKELETAL: [] muscle pain, [] muscle swelling, [] joint pain, [] muscle weakness  Neurological/Psychiatric: [] Sensory disturbances, [] motor disturbances, [] difficulty with speech, [] paresthesias, [] paralysis, [] depression, [] anxiety   Allergy/Immunological/Lymphatic/Endocrine: [x] anemia, [] rashes, [] polyuria, [] polydypsia      Physical Exam:  Vitals:    11/06/18 0734 11/06/18 0934 11/06/18 0959 11/06/18 1131   BP: 122/66 113/66 (!) 105/59    Pulse: 79 74 70    Resp: 20 16 16    Temp: 98.1 °F (36.7 °C) 97.7 °F (36.5 °C) 98 °F (36.7 °C)    TempSrc: Temporal  Temporal    SpO2: 98% 98% 100%    Weight:    227 lb 8 oz (103.2 kg)   Height:           Constitutional: [x] NAD, [x] of stated age, [x] well nourished  Eyes: [] PERRL, [x] conjunctiva clear, [x] Non inflamed irises, [x] no scleral icterus, [] no lid ptosis  ENT/Mouth: []  Nares patent with pink mucosa, [x] oropharynx clear without exudates or erythema, [] hearing grossly normal; +left nostril packing with blood in the packing  Lungs: [x] respirations non labored with good effort, [] CTA bilaterally, [x] no respiratory distress, [x] diminished breath sounds, [x] no wheezing, []  Equal air entry bilaterally  Heart: [] normal S1S2, [x]  Regular rate, []  No murmurs, [x] pedal pulses preserved 2/4 bilaterally, [] no LE edema  Abdomen: [x] +BSx4, []  Hypoactive bowel sounds, [] hyperactive bowel sounds, [x] NTND, [x] soft, [x] no guarding, [] negative timmons's sign, [] no peritoneal signs  Muscuoskeletal: [] Normal bladder which may be extrinsic pressure of the enlarged prostate? Siddharth Chevy This may be further evaluated. The prostate is moderately enlarged. There are small fat-containing inguinal hernias bilaterally. The stomach appears normal. Normal small bowel is seen. Appendix is not visualized. There are no finding to suggest appendicitis. Moderate gas and stool are seen in the colon. There are severe atheromatous changes of the abdominal aorta and iliac arteries. No aneurysmal dilatation or dissection. There is a mild retroperitoneal para-aortic lymphadenopathy. There is a small amount of ascites particularly in the right upper abdomen in the paracolic gutter. Severe chronic degenerative changes of the lumbar spine are seen with a mild levoscoliosis. A small amount of abdominal ascites. Moderate thickening of the wall of the gallbladder with a fluid in the gallbladder fossa may represent acute cholecystitis. No gallstones are noted. This may be clinically correlated and further evaluated. The urinary bladder is decompressed. There is an apparent soft tissue density located posteriorly at the floor of the urinary bladder which may be extrinsic pressure from the enlarged prostate. This may be clinically correlated. A small bibasilar pleural effusion, more on the right side. The severe atheromatous changes of the abdominal aorta and iliac arteries. The above study was initially reviewed and reported by [facility]. I do not find any discrepancies. Signed by Dr Emerson January on 10/23/2018 7:38 AM    Xr Chest Portable    Result Date: 11/5/2018  EXAMINATION: XR CHEST PORTABLE 11/5/2018 2:28 PM HISTORY: Shortness of breath. Report: Comparison is made with the study from 10/25/2018. The left lung remains clear, there is increased infiltrate and consolidation of the right base which probably represents worsening of pneumonia though an underlying right subhilar mass cannot be excluded.  The patient had a chest CT on 10/23/2018 which does not show a mass however. The right internal jugular infusion catheter appears in good position as before. Heart size is normal. No pneumothorax is identified. There are probably small bilateral pleural effusions. Increased consolidation of the right lung base, greatest in the medial lung base most likely related to worsening of pneumonia and small parapneumonic effusion. Signed by Dr Vonna Bosworth. Sonidoanalynimco on 11/5/2018 2:30 PM    Xr Chest Portable    Result Date: 10/25/2018  XR CHEST PORTABLE 10/25/2018 12:30 PM HISTORY: [facility] catheter placement COMPARISON: October 24, 2018. FINDINGS: The left lung is clear. There is a persistent opacity in the right lung base. This may be residual pulmonary edema, possibly atelectasis or an inflammatory process. . Cardiac silhouette is mildly enlarged. A right PIC catheter is present with the distal tip satisfactorily positioned in the superior vena cava. . The osseous structures and surrounding soft tissues demonstrate no acute abnormality. 1. Persistent opacity in the right lung base. This may be residual pulmonary edema, possibly atelectasis or an inflammatory process this is unchanged from October 24. Signed by Dr Glenis Stevens on 10/25/2018 2:01 PM    Xr Chest Portable    Result Date: 10/24/2018  EXAMINATION: XR CHEST PORTABLE 10/24/2018 8:12 AM HISTORY: Pleural effusion, possible pneumonia COMPARISON: 10/23/2018 FINDINGS: The heart and mediastinal contours are stable. The aorta is tortuous with atherosclerotic calcifications. The pulmonary vasculature remains somewhat indistinct, though there has been improvement in the bilateral perihilar opacities. Airspace opacities persist at the lung bases, possibly atelectasis. There is no appreciable pneumothorax. Improving pulmonary edema and pulmonary vascular congestion. Bibasilar atelectasis. Signed by Dr Cayden Melo on 10/24/2018 8:14 AM    Xr Chest Portable    Result Date: 10/23/2018  Examination.  XR CHEST PORTABLE History:

## 2018-11-06 NOTE — PLAN OF CARE
Problem: Falls - Risk of:  Goal: Will remain free from falls  Will remain free from falls   Outcome: Ongoing      Problem: ABCDS Injury Assessment  Goal: No falls during hospitalization  Patient will not fall during hospitalization.      Outcome: Ongoing

## 2018-11-07 LAB
BASOPHILS ABSOLUTE: 0.1 K/UL (ref 0–0.2)
BASOPHILS RELATIVE PERCENT: 0.7 % (ref 0–1)
EOSINOPHILS ABSOLUTE: 0 K/UL (ref 0–0.6)
EOSINOPHILS RELATIVE PERCENT: 0 % (ref 0–5)
HCT VFR BLD CALC: 27.2 % (ref 42–52)
HCT VFR BLD CALC: 29.6 % (ref 42–52)
HEMOGLOBIN: 8.9 G/DL (ref 14–18)
HEMOGLOBIN: 9.2 G/DL (ref 14–18)
LYMPHOCYTES ABSOLUTE: 1.2 K/UL (ref 1.1–4.5)
LYMPHOCYTES RELATIVE PERCENT: 12.8 % (ref 20–40)
MCH RBC QN AUTO: 29.3 PG (ref 27–31)
MCH RBC QN AUTO: 29.5 PG (ref 27–31)
MCHC RBC AUTO-ENTMCNC: 31.1 G/DL (ref 33–37)
MCHC RBC AUTO-ENTMCNC: 32.7 G/DL (ref 33–37)
MCV RBC AUTO: 90.1 FL (ref 80–94)
MCV RBC AUTO: 94.3 FL (ref 80–94)
MONOCYTES ABSOLUTE: 1.1 K/UL (ref 0–0.9)
MONOCYTES RELATIVE PERCENT: 12.6 % (ref 0–10)
NEUTROPHILS ABSOLUTE: 6.4 K/UL (ref 1.5–7.5)
NEUTROPHILS RELATIVE PERCENT: 70.6 % (ref 50–65)
PDW BLD-RTO: 17.4 % (ref 11.5–14.5)
PDW BLD-RTO: 17.9 % (ref 11.5–14.5)
PLATELET # BLD: 158 K/UL (ref 130–400)
PLATELET # BLD: 190 K/UL (ref 130–400)
PMV BLD AUTO: 11.3 FL (ref 9.4–12.4)
PMV BLD AUTO: 11.4 FL (ref 9.4–12.4)
RBC # BLD: 3.02 M/UL (ref 4.7–6.1)
RBC # BLD: 3.14 M/UL (ref 4.7–6.1)
WBC # BLD: 8 K/UL (ref 4.8–10.8)
WBC # BLD: 9.1 K/UL (ref 4.8–10.8)

## 2018-11-07 PROCEDURE — 2580000003 HC RX 258: Performed by: INTERNAL MEDICINE

## 2018-11-07 PROCEDURE — 85025 COMPLETE CBC W/AUTO DIFF WBC: CPT

## 2018-11-07 PROCEDURE — 1210000000 HC MED SURG R&B

## 2018-11-07 PROCEDURE — 6360000002 HC RX W HCPCS: Performed by: FAMILY MEDICINE

## 2018-11-07 PROCEDURE — 99232 SBSQ HOSP IP/OBS MODERATE 35: CPT | Performed by: FAMILY MEDICINE

## 2018-11-07 PROCEDURE — 2700000000 HC OXYGEN THERAPY PER DAY

## 2018-11-07 PROCEDURE — 6370000000 HC RX 637 (ALT 250 FOR IP): Performed by: INTERNAL MEDICINE

## 2018-11-07 PROCEDURE — 36415 COLL VENOUS BLD VENIPUNCTURE: CPT

## 2018-11-07 PROCEDURE — 85027 COMPLETE CBC AUTOMATED: CPT

## 2018-11-07 PROCEDURE — 2580000003 HC RX 258: Performed by: FAMILY MEDICINE

## 2018-11-07 PROCEDURE — 8010000000 HC HEMODIALYSIS ACUTE INPT

## 2018-11-07 RX ORDER — OXYMETAZOLINE HYDROCHLORIDE 0.05 G/100ML
2 SPRAY NASAL ONCE
Status: DISCONTINUED | OUTPATIENT
Start: 2018-11-07 | End: 2018-11-08 | Stop reason: HOSPADM

## 2018-11-07 RX ADMIN — Medication 10 ML: at 22:02

## 2018-11-07 RX ADMIN — Medication 10 ML: at 11:25

## 2018-11-07 RX ADMIN — TAMSULOSIN HYDROCHLORIDE 0.4 MG: 0.4 CAPSULE ORAL at 11:24

## 2018-11-07 RX ADMIN — PANTOPRAZOLE SODIUM 40 MG: 40 TABLET, DELAYED RELEASE ORAL at 11:24

## 2018-11-07 RX ADMIN — CEFEPIME HYDROCHLORIDE 2 G: 2 INJECTION, POWDER, FOR SOLUTION INTRAVENOUS at 22:02

## 2018-11-07 RX ADMIN — METOPROLOL SUCCINATE 100 MG: 50 TABLET, EXTENDED RELEASE ORAL at 11:25

## 2018-11-07 RX ADMIN — CEFEPIME HYDROCHLORIDE 2 G: 2 INJECTION, POWDER, FOR SOLUTION INTRAVENOUS at 11:24

## 2018-11-07 ASSESSMENT — ENCOUNTER SYMPTOMS
EYE REDNESS: 0
NAUSEA: 0
BACK PAIN: 0
DIARRHEA: 0
CONSTIPATION: 0
SORE THROAT: 0
GASTROINTESTINAL NEGATIVE: 1
VOMITING: 0
EYE DISCHARGE: 0
EYE PAIN: 0
WHEEZING: 0
ABDOMINAL PAIN: 0
COUGH: 1
BLOOD IN STOOL: 0
SHORTNESS OF BREATH: 1
EYES NEGATIVE: 1

## 2018-11-07 NOTE — PLAN OF CARE
Problem: Falls - Risk of:  Goal: Will remain free from falls  Will remain free from falls   Outcome: Ongoing    Goal: Absence of physical injury  Absence of physical injury   Outcome: Ongoing      Problem: Risk for Impaired Skin Integrity  Goal: Tissue integrity - skin and mucous membranes  Structural intactness and normal physiological function of skin and  mucous membranes. Outcome: Ongoing      Problem: ABCDS Injury Assessment  Goal: No falls during hospitalization  Patient will not fall during hospitalization.      Outcome: Ongoing

## 2018-11-07 NOTE — PROGRESS NOTES
Nephrology ([facility]) Progress Note    Patient:  Akin Matias  YOB: 1940  Date of Service: 11/7/2018  MRN: [id number]   Acct: [de-identified]   Primary Care Physician: Lynn Rubio MD  Advance Directive: Full Code  Admit Date: 11/5/2018       Hospital Day: 2  Referring Provider: Brandee Berger DO    Patient Seen, Chart, Consults, Notes, Labs, Radiology studies reviewed. Subjective:    Akin Matias is a 69 y.o. male  whom we were consulted for acute kidney injury. He has underlying chronic kidney stage 3. Patient recently underwent right knee replacement. Postoperatively, he was sent for rehabilitation to nursing home. He was readmitted with dyspnea on exertion. He was hypotensive, has ruled in  for urinary tract infection. He developed septic shock, has required pressors. He developed ANA and had to start dialysis. He was moved back to the nursing home. He was anticoagulated for atrial fibrillation and was having hematuria. He has a Shultz catheter and is followed by urology. He returned this time for epistaxis and oozing around his dialysis catheter. Renal service was consulted to manage his ANA. His left nostril is still packed. Today, he was seen and examined n dialysis.  He offered no new complaints       Dialysis   Pt was seen on RRT  Modality: Hemodialysis  Access: Catheter  Location: right upper  QB: 400  QD: 600  UF: 3.2 liters    Allergies:  Promethazine hcl    Medicines:  Current Facility-Administered Medications   Medication Dose Route Frequency Provider Last Rate Last Dose    0.9 % sodium chloride infusion 250 mL  250 mL Intravenous Once ELISABETH Tolbert        ceFEPIme (MAXIPIME) 2 g in sterile water 20 mL IV syringe  2 g Intravenous Q12H Thang Orantes DO   2 g at 11/06/18 2159    sodium chloride flush 0.9 % injection 10 mL  10 mL Intravenous 2 times per day Kasia Henry MD   10 mL at 11/06/18 2159    sodium chloride flush 0.9 % injection 10 mL  10 mL Intravenous PRN Sonal Vázquez MD        acetaminophen (TYLENOL) tablet 650 mg  650 mg Oral Q8H PRN Sonal Vázquez MD        metoprolol succinate (TOPROL XL) extended release tablet 100 mg  100 mg Oral Daily Zaire Richardson MD   100 mg at 11/06/18 0741    pantoprazole (PROTONIX) tablet 40 mg  40 mg Oral Daily Zaire Richardson MD   40 mg at 11/06/18 0741    tamsulosin (FLOMAX) capsule 0.4 mg  0.4 mg Oral Daily Williemae Armando Richardson MD   0.4 mg at 11/06/18 0741       Past Medical History:  Past Medical History:   Diagnosis Date    Acute liver failure without hepatic coma 10/23/2018    Back pain     \"with tired legs as a result\"    Blood circulation, collateral     Carotid arterial disease ([facility] [address]     recent surgery    CHF (congestive heart failure) ([facility] [address] 10/23/2018    CKD (chronic kidney disease), 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/2018    GERD (gastroesophageal reflux disease)     Hyperlipidemia     Hypertension     Hypertension     Palliative care patient 10/23/2018    Pneumonia due to infectious organism 11/6/2018    Primary osteoarthritis of left knee 10/14/2018    PVD (peripheral vascular disease) [facility])        Past Surgical History:  Past Surgical History:   Procedure Laterality Date    BACK SURGERY      COLONOSCOPY  2007?  [place] COLONOSCOPY FLX DX W/COLLJ SPEC WHEN PFRMD N/A 9/11/2017    Dr Moises Prabhakar internal hemorrhoids, diverticular disease-HP-No recall (age)   [place] REVISE MEDIAN N/CARPAL TUNNEL SURG Left 7/18/2018    OPEN CARPAL TUNNEL RELEASE performed by Annabel Nettles MD at [address] Left 8/28/2018    LEFT CAROTID ENDARTERECTOMY WITH VEIN PATCH ANGIOPLASTY AND COMPLETION ANGIOGRAM performed by Mike Delgado MD at [address] 10/15/2018    LEFT COMPLEX TOTAL KNEE ARTHROPLASTY performed by Annabel Nettles MD at [facility] VASCULAR SURGERY  04/21/2015    Pavel BESS Ultrasound guided access of left common femoral artery. Aortogram.Diagnostic right lower extremity arteriogram.Radiologic supervision and interpretation.  VASCULAR SURGERY  01/13/2015    Enedina Rees M.D Atherectomy,angioplasty,and stenting of left superficial femoral artery.  VASCULAR SURGERY  03/11/2014    Enedina Rees M.D. Ultrasound-guided access of right common femoral artery. Aortogram.Left lower extremity arteriogram.Atherectomy and angioplasty of left superficial femoral artery. Radiologic supervision and interpretation.  VASCULAR SURGERY  01/18/2013    Enedina BESS Aortogram.Multistation arteriogram right lower extremity. Laser atherectomy and angioplasty of right superficial femoral artery. Selective catheterization of right tibioperoneal trunk. Angioplasty of peroneal artery and tibioperoneal trunk.  VASCULAR SURGERY  10/30/2018    SJS. Ultrasound guided cannulation of right internal vein. Placement of right internal jugular vein tunneled dialysis catheter bard equistream xk 23cm tip to cuff       Family History  Family History   Problem Relation Age of Onset    Colon Cancer Father     Diabetes Brother     Colon Polyps Neg Hx     Liver Cancer Neg Hx     Liver Disease Neg Hx     Esophageal Cancer Neg Hx     Rectal Cancer Neg Hx     Stomach Cancer Neg Hx        Social History  Social History     Social History    Marital status:      Spouse name: N/A    Number of children: N/A    Years of education: N/A     Occupational History    Not on file.      Social History Main Topics    Smoking status: Former Smoker     Quit date: 6/3/2003    Smokeless tobacco: Never Used    Alcohol use 7.2 oz/week     12 Glasses of wine per week      Comment: 2 glasses of wine every night    Drug use: No    Sexual activity: Yes     Partners: Female     Other Topics Concern    Not on file     Social History Narrative    No narrative on file         Review of Systems:  Reviewed with the patient at the bedside, 6

## 2018-11-07 NOTE — PROGRESS NOTES
Lab up on floor to draw CBC, patient is back in dialysis. Dialysis nurse stated pt has been on machine too long and result will be inaccurate.  Will draw when he is finished with HD

## 2018-11-07 NOTE — PROGRESS NOTES
injection 10 mL  10 mL Intravenous PRN Kitty Pruitt MD        acetaminophen (TYLENOL) tablet 650 mg  650 mg Oral Q8H PRN Kitty Pruitt MD        metoprolol succinate (TOPROL XL) extended release tablet 100 mg  100 mg Oral Daily Zaire Richardson MD   100 mg at 11/07/18 1125    pantoprazole (PROTONIX) tablet 40 mg  40 mg Oral Daily Zaire Richardson MD   40 mg at 11/07/18 1124    tamsulosin (FLOMAX) capsule 0.4 mg  0.4 mg Oral Daily Zaire Richardson MD   0.4 mg at 11/07/18 1124        Labs:     Recent Labs      11/06/18   0235  11/07/18   1020  11/07/18   1110   WBC  8.9  8.0  9.1   RBC  2.36*  3.14*  3.02*   HGB  7.0*  9.2*  8.9*   HCT  21.9*  29.6*  27.2*   MCV  92.8  94.3*  90.1   MCH  29.7  29.3  29.5   MCHC  32.0*  31.1*  32.7*   PLT  136  190  158     Recent Labs      11/05/18   0250  11/05/18   1014   NA  142  141   K  3.9  4.7   ANIONGAP  10  11   CL  103  104   CO2  29  26   BUN  44*  45*   CREATININE  2.4*  2.3*   GLUCOSE  185*  227*   CALCIUM  8.7*  8.1*     No results for input(s): MG, PHOS in the last 72 hours. Recent Labs      11/05/18   0250  11/05/18   1014   AST  74*  63*   ALT  193*  170*   BILITOT  3.1*  2.6*   ALKPHOS  308*  287*     ABGs:No results for input(s): PHART, OOF6CLW, PO2ART, ZAI8KNM, BEART, HGBAE, V5YTRMII, CARBOXHGBART, 02THERAPY in the last 72 hours. HgBA1c: Invalid input(s): HGBA1C  FLP:    Lab Results   Component Value Date    TRIG 176 10/01/2018    HDL 38 10/01/2018    LDLCALC 72 10/01/2018     TSH:  No results found for: TSH  Troponin T: No results for input(s): TROPONINI in the last 72 hours.   INR:   Recent Labs      11/05/18   0250   INR  1.29*       Objective:   Vitals: /61   Pulse 83   Temp 97.7 °F (36.5 °C) (Temporal)   Resp 18   Ht 6' (1.829 m)   Wt 243 lb (110.2 kg)   SpO2 96%   BMI 32.96 kg/m²   24HR INTAKE/OUTPUT:      Intake/Output Summary (Last 24 hours) at 11/07/18 1635  Last data filed at 11/07/18 1112   Gross per 24 hour   Intake

## 2018-11-07 NOTE — CARE COORDINATION
Per Pauline Tello at [facility], [address] is a bedhold with their facility; also has a chair time at [employer] MWF 4:15PM.    Electronically signed by FELIPA Tim on 11/7/2018 at 4:22 PM

## 2018-11-07 NOTE — PROGRESS NOTES
9363   Gross per 24 hour   Intake              913 ml   Output              800 ml   Net              113 ml       Evaluated in dialysis this am  Physical Exam   Constitutional: He is oriented to person, place, and time. He appears well-developed. He appears ill. No distress. HENT:   Head: Normocephalic and atraumatic. Mouth/Throat: Oropharynx is clear and moist.   Eyes: Conjunctivae are normal. Right eye exhibits no discharge. Left eye exhibits no discharge. No scleral icterus. Neck: Normal range of motion. Neck supple. No JVD present. No tracheal deviation present. Cardiovascular: Normal rate, regular rhythm, normal heart sounds and intact distal pulses. Exam reveals no gallop and no friction rub. No murmur heard. Pulmonary/Chest: Effort normal. No respiratory distress. He has decreased breath sounds in the right lower field and the left lower field. He has no wheezes. He has no rales. He exhibits no tenderness. Abdominal: Soft. Bowel sounds are normal. He exhibits no distension and no mass. There is no tenderness. There is no rebound and no guarding. No hernia. Genitourinary:   Genitourinary Comments: Shultz to BSD   Musculoskeletal: Normal range of motion. He exhibits no edema, tenderness or deformity. Lymphadenopathy:     He has no cervical adenopathy. Neurological: He is alert and oriented to person, place, and time. No cranial nerve deficit. Coordination normal.   Skin: Skin is warm. No rash noted. He is not diaphoretic. No erythema. No pallor. Psychiatric: He has a normal mood and affect. His behavior is normal. Thought content normal.   Nursing note and vitals reviewed.       BMP:   Recent Labs      11/05/18   0250  11/05/18   1014   NA  142  141   K  3.9  4.7   CL  103  104   CO2  29  26   BUN  44*  45*   CREATININE  2.4*  2.3*   GLUCOSE  185*  227*   CALCIUM  8.7*  8.1*     CBC:   Recent Labs      11/05/18   1741  11/06/18   0235   WBC  10.3  8.9   HGB  7.8*  7.0*   HCT  24.5*  21.9* MCV  92.1  92.8   PLT  146  136     CMP:    Recent Labs      11/05/18   0250  11/05/18   1014   NA  142  141   K  3.9  4.7   CL  103  104   CO2  29  26   BUN  44*  45*   CREATININE  2.4*  2.3*   LABGLOM  26*  28*   GLUCOSE  185*  227*   PROT  5.4*  4.6*   LABALBU  3.0*  2.8*   CALCIUM  8.7*  8.1*   BILITOT  3.1*  2.6*   ALKPHOS  308*  287*   AST  74*  63*   ALT  193*  170*       30Day lookback of cultures:    Blood Culture Recent:   Recent Labs      10/23/18   1015   BC  No growth after 5 days of incubation. Gram Stain Recent: No results for input(s): LABGRAM in the last 720 hours. Resp Culture Recent: No results for input(s): CULTRESP in the last 720 hours. Body Fluid Recent : No results for input(s): BFCX in the last 720 hours. MRSA Recent : No results for input(s): [address] in the last 720 hours. Urine Culture Recent : No results for input(s): LABURIN in the last 720 hours. Organism Recent : No results for input(s): ORG in the last 720 hours. Radiology:   Xr Knee Left (1-2 Views)    Result Date: 10/15/2018  Examination. XR KNEE LEFT (1-2 VIEWS) History: Postop arthroplasty. The portable frontal and crosstable laterally of the left knee are obtained. There is no previous study for comparison. There is evidence of a knee arthroplasty. There is normal alignment of the hardware components. No complication. There is soft tissue air around the knee. No acute bony abnormality. A superficial femoral and popliteal artery graft is seen in place. Severe atheromatous changes of the distal popliteal and tibial arteries are seen. A normal left knee arthroplasty. Signed by Dr Jesus Burton on 10/15/2018 1:19 PM    Us Renal Complete    Result Date: 10/23/2018  EXAMINATION: US RENAL COMPLETE 10/23/2018 5:53 PM HISTORY: Acute renal failure. Gross hematuria. Report: Sonographic images of the kidneys were obtained, comparison is made with the previous ultrasound 10/17/2018.   The right kidney measures 12.0 x 4.9 x a component of uremia and platelet dysfunction        Normocytic anemia with recent hematuria: hemoglobin 8.0 on 11/2/2018. Reports episode of bright red blood with last bowel movement on 11/3/2018   · Hemoglobin 7.0 (s/p 1 unit of pRBCs on 10/24/2018, 10/26/2018 and 11/6/18). Labs on 10/16/18  · Iron sat 15%    · Iron 37   · TIBC 244    · Vitamin B12 337   · Ferritin - >2000 on 10/24/2018  · Occult stool positive x1     GI evaluated patient. No plans for colonoscopy at this time. Labs 11/5/18  PT/INR 16.0/1.29  PTT 29.3  Fibrinogen 359     Acute kidney injury  · Renal ultrasound 10/23/2018- Mild atrophy of the left kidney with mild cortical thinning. No renal mass or hydronephrosis. · Creatinine 2.3, GFR 28 on 11/5/2018  · Initiation of hemodialysis on 10/24/2018, continuing MWF with nephrology managing        Acute liver failure - secondary to recent shock liver: IMPROVING  · Total bilirubin 2.6 on 11/5/18, down from 10.3 on 10/27/2018  · Alkaline phosphatase 287 on 11/5/2018  ·  on 11/5/18, down from 1590    · AST 63 on 11/5/18, down from 511   · CT scan of abdomen and pelvis on 10/23/2018- small amount of abdominal ascites. Moderate thickening of the wall of the gallbladder with a fluid in the gallbladder fossa may represent acute cholecystitis. Liver and spleen appeared unremarkable. · HIV non-reactive on 10/24/2018  · Hepatitis B non-reactive on 10/24/2018      Continue to monitor counts. No plans for colonoscopy per GI. Will need cystoscopy as outpatient as originally planned.       [facility], APRN    11/07/18  7:00 AM

## 2018-11-08 VITALS
WEIGHT: 234.25 LBS | SYSTOLIC BLOOD PRESSURE: 154 MMHG | HEIGHT: 72 IN | RESPIRATION RATE: 18 BRPM | DIASTOLIC BLOOD PRESSURE: 80 MMHG | OXYGEN SATURATION: 97 % | HEART RATE: 80 BPM | TEMPERATURE: 98.4 F | BODY MASS INDEX: 31.73 KG/M2

## 2018-11-08 LAB
ANION GAP SERPL CALCULATED.3IONS-SCNC: 9 MMOL/L (ref 7–19)
BASOPHILS ABSOLUTE: 0 K/UL (ref 0–0.2)
BASOPHILS RELATIVE PERCENT: 0.5 % (ref 0–1)
BUN BLDV-MCNC: 20 MG/DL (ref 8–23)
CALCIUM SERPL-MCNC: 8.1 MG/DL (ref 8.8–10.2)
CHLORIDE BLD-SCNC: 101 MMOL/L (ref 98–111)
CO2: 27 MMOL/L (ref 22–29)
CREAT SERPL-MCNC: 1.5 MG/DL (ref 0.5–1.2)
EOSINOPHILS ABSOLUTE: 0 K/UL (ref 0–0.6)
EOSINOPHILS RELATIVE PERCENT: 0.1 % (ref 0–5)
GFR NON-AFRICAN AMERICAN: 45
GLUCOSE BLD-MCNC: 123 MG/DL (ref 74–109)
HCT VFR BLD CALC: 26.4 % (ref 42–52)
HCT VFR BLD CALC: 28.2 % (ref 42–52)
HEMOGLOBIN: 8.4 G/DL (ref 14–18)
HEMOGLOBIN: 8.9 G/DL (ref 14–18)
LYMPHOCYTES ABSOLUTE: 1 K/UL (ref 1.1–4.5)
LYMPHOCYTES RELATIVE PERCENT: 13.1 % (ref 20–40)
MCH RBC QN AUTO: 29.3 PG (ref 27–31)
MCH RBC QN AUTO: 30.1 PG (ref 27–31)
MCHC RBC AUTO-ENTMCNC: 29.8 G/DL (ref 33–37)
MCHC RBC AUTO-ENTMCNC: 33.7 G/DL (ref 33–37)
MCV RBC AUTO: 89.2 FL (ref 80–94)
MCV RBC AUTO: 98.3 FL (ref 80–94)
MONOCYTES ABSOLUTE: 0.9 K/UL (ref 0–0.9)
MONOCYTES RELATIVE PERCENT: 12.2 % (ref 0–10)
NEUTROPHILS ABSOLUTE: 5.4 K/UL (ref 1.5–7.5)
NEUTROPHILS RELATIVE PERCENT: 71.1 % (ref 50–65)
PDW BLD-RTO: 17 % (ref 11.5–14.5)
PDW BLD-RTO: 17.7 % (ref 11.5–14.5)
PLATELET # BLD: 145 K/UL (ref 130–400)
PLATELET # BLD: 147 K/UL (ref 130–400)
PMV BLD AUTO: 12.2 FL (ref 9.4–12.4)
PMV BLD AUTO: 12.6 FL (ref 9.4–12.4)
POTASSIUM SERPL-SCNC: 4.1 MMOL/L (ref 3.5–5)
RBC # BLD: 2.87 M/UL (ref 4.7–6.1)
RBC # BLD: 2.96 M/UL (ref 4.7–6.1)
SODIUM BLD-SCNC: 137 MMOL/L (ref 136–145)
WBC # BLD: 6.5 K/UL (ref 4.8–10.8)
WBC # BLD: 7.6 K/UL (ref 4.8–10.8)

## 2018-11-08 PROCEDURE — 6370000000 HC RX 637 (ALT 250 FOR IP): Performed by: INTERNAL MEDICINE

## 2018-11-08 PROCEDURE — 85027 COMPLETE CBC AUTOMATED: CPT

## 2018-11-08 PROCEDURE — 2580000003 HC RX 258: Performed by: FAMILY MEDICINE

## 2018-11-08 PROCEDURE — 85025 COMPLETE CBC W/AUTO DIFF WBC: CPT

## 2018-11-08 PROCEDURE — 6360000002 HC RX W HCPCS: Performed by: FAMILY MEDICINE

## 2018-11-08 PROCEDURE — 99239 HOSP IP/OBS DSCHRG MGMT >30: CPT | Performed by: INTERNAL MEDICINE

## 2018-11-08 PROCEDURE — 80048 BASIC METABOLIC PNL TOTAL CA: CPT

## 2018-11-08 PROCEDURE — 36415 COLL VENOUS BLD VENIPUNCTURE: CPT

## 2018-11-08 PROCEDURE — 2580000003 HC RX 258: Performed by: INTERNAL MEDICINE

## 2018-11-08 RX ORDER — LEVOFLOXACIN 750 MG/1
750 TABLET ORAL DAILY
Qty: 5 TABLET | Refills: 0 | Status: SHIPPED | OUTPATIENT
Start: 2018-11-08 | End: 2018-11-13

## 2018-11-08 RX ADMIN — CEFEPIME HYDROCHLORIDE 2 G: 2 INJECTION, POWDER, FOR SOLUTION INTRAVENOUS at 10:04

## 2018-11-08 RX ADMIN — TAMSULOSIN HYDROCHLORIDE 0.4 MG: 0.4 CAPSULE ORAL at 09:54

## 2018-11-08 RX ADMIN — METOPROLOL SUCCINATE 100 MG: 50 TABLET, EXTENDED RELEASE ORAL at 09:54

## 2018-11-08 RX ADMIN — PANTOPRAZOLE SODIUM 40 MG: 40 TABLET, DELAYED RELEASE ORAL at 09:54

## 2018-11-08 RX ADMIN — Medication 10 ML: at 09:54

## 2018-11-08 ASSESSMENT — ENCOUNTER SYMPTOMS
BLOOD IN STOOL: 0
CONSTIPATION: 0
EYE REDNESS: 0
VOMITING: 0
COUGH: 0
NAUSEA: 0
SHORTNESS OF BREATH: 0
DIARRHEA: 0
EYE DISCHARGE: 0
BACK PAIN: 0
ABDOMINAL PAIN: 0
WHEEZING: 0
EYE PAIN: 0
SORE THROAT: 0
EYES NEGATIVE: 1
GASTROINTESTINAL NEGATIVE: 1

## 2018-11-08 NOTE — PROGRESS NOTES
effusion. Signed by Dr Ramin Young. Holly on 11/5/2018 2:30 PM    Xr Chest Portable    Result Date: 10/25/2018  XR CHEST PORTABLE 10/25/2018 12:30 PM HISTORY: [facility] catheter placement COMPARISON: October 24, 2018. FINDINGS: The left lung is clear. There is a persistent opacity in the right lung base. This may be residual pulmonary edema, possibly atelectasis or an inflammatory process. . Cardiac silhouette is mildly enlarged. A right PIC catheter is present with the distal tip satisfactorily positioned in the superior vena cava. . The osseous structures and surrounding soft tissues demonstrate no acute abnormality. 1. Persistent opacity in the right lung base. This may be residual pulmonary edema, possibly atelectasis or an inflammatory process this is unchanged from October 24. Signed by Dr Miguelina Cordoba on 10/25/2018 2:01 PM    Xr Chest Portable    Result Date: 10/24/2018  EXAMINATION: XR CHEST PORTABLE 10/24/2018 8:12 AM HISTORY: Pleural effusion, possible pneumonia COMPARISON: 10/23/2018 FINDINGS: The heart and mediastinal contours are stable. The aorta is tortuous with atherosclerotic calcifications. The pulmonary vasculature remains somewhat indistinct, though there has been improvement in the bilateral perihilar opacities. Airspace opacities persist at the lung bases, possibly atelectasis. There is no appreciable pneumothorax. Improving pulmonary edema and pulmonary vascular congestion. Bibasilar atelectasis. Signed by Dr Ena Kessler on 10/24/2018 8:14 AM    Xr Chest Portable    Result Date: 10/23/2018  Examination. XR CHEST PORTABLE History: CVC insertion. A frontal portable upright view of the chest is compared with the previous study obtained earlier today. No interval change. There is persistent pulmonary vascular congestion and pulmonary edema. There are atelectatic changes in the right lower lung. There is moderate cardiomegaly. Atheromatous changes of thoracic aorta are noted. No pneumothorax.

## 2018-11-08 NOTE — PROGRESS NOTES
10 mL  10 mL Intravenous PRN Ally Nowak MD        acetaminophen (TYLENOL) tablet 650 mg  650 mg Oral Q8H PRN Ally Nowak MD        metoprolol succinate (TOPROL XL) extended release tablet 100 mg  100 mg Oral Daily Zaire Richardson MD   100 mg at 11/07/18 1125    pantoprazole (PROTONIX) tablet 40 mg  40 mg Oral Daily Zaire Richardson MD   40 mg at 11/07/18 1124    tamsulosin (FLOMAX) capsule 0.4 mg  0.4 mg Oral Daily Zaire Richardson MD   0.4 mg at 11/07/18 1124       Past Medical History:  Past Medical History:   Diagnosis Date    Acute liver failure without hepatic coma 10/23/2018    Back pain     \"with tired legs as a result\"    Blood circulation, collateral     Carotid arterial disease [facility] [address]     recent surgery    CHF (congestive heart failure) ([facility] [address] 10/23/2018    CKD (chronic kidney disease), 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/2018    GERD (gastroesophageal reflux disease)     Hyperlipidemia     Hypertension     Hypertension     Palliative care patient 10/23/2018    Pneumonia due to infectious organism 11/6/2018    Primary osteoarthritis of left knee 10/14/2018    [facility]peripheral vascular disease) [facility])        Past Surgical History:  Past Surgical History:   Procedure Laterality Date    BACK SURGERY      COLONOSCOPY  2007?  [place] COLONOSCOPY FLX DX W/COLLJ SPEC WHEN PFRMD N/A 9/11/2017    Dr Ariela Castañeda internal hemorrhoids, diverticular disease-HP-No recall (age)   Rajwinder Rodriguez [place] REVISE MEDIAN N/CARPAL TUNNEL SURG Left 7/18/2018    OPEN CARPAL TUNNEL RELEASE performed by Terrance Benton MD at [address] Left 8/28/2018    LEFT CAROTID ENDARTERECTOMY WITH VEIN PATCH ANGIOPLASTY AND COMPLETION ANGIOGRAM performed by Emeline Babinski, MD at [address] [place] [address],[address] Left 10/15/2018    LEFT COMPLEX TOTAL KNEE ARTHROPLASTY performed by Terrance Benton MD at [facility] VASCULAR SURGERY  04/21/2015    Mary Lou BESS Ultrasound guided access of left common femoral artery. Aortogram.Diagnostic right lower extremity arteriogram.Radiologic supervision and interpretation.  VASCULAR SURGERY  01/13/2015    Janyth Apley M.D Atherectomy,angioplasty,and stenting of left superficial femoral artery.  VASCULAR SURGERY  03/11/2014    Janyth Apley M.D. Ultrasound-guided access of right common femoral artery. Aortogram.Left lower extremity arteriogram.Atherectomy and angioplasty of left superficial femoral artery. Radiologic supervision and interpretation.  VASCULAR SURGERY  01/18/2013    Janyth Apley M. D. Aortogram.Multistation arteriogram right lower extremity. Laser atherectomy and angioplasty of right superficial femoral artery. Selective catheterization of right tibioperoneal trunk. Angioplasty of peroneal artery and tibioperoneal trunk.  VASCULAR SURGERY  10/30/2018    [facility]. Ultrasound guided cannulation of right internal vein. Placement of right internal jugular vein tunneled dialysis catheter bard marjaneam xk 23cm tip to cuff       Family History  Family History   Problem Relation Age of Onset    Colon Cancer Father     Diabetes Brother     Colon Polyps Neg Hx     Liver Cancer Neg Hx     Liver Disease Neg Hx     Esophageal Cancer Neg Hx     Rectal Cancer Neg Hx     Stomach Cancer Neg Hx        Social History  Social History     Social History    Marital status:      Spouse name: N/A    Number of children: N/A    Years of education: N/A     Occupational History    Not on file.      Social History Main Topics    Smoking status: Former Smoker     Quit date: 6/3/2003    Smokeless tobacco: Never Used    Alcohol use 7.2 oz/week     12 Glasses of wine per week      Comment: 2 glasses of wine every night    Drug use: No    Sexual activity: Yes     Partners: Female     Other Topics Concern    Not on file     Social History Narrative    No narrative on file         Review of Systems:  History obtained from chart review and the patient  General ROS: No fever or chills  Respiratory ROS: No cough shortness of breath, -wheezing  Cardiovascular ROS: no chest pain, no dyspnea on exertion  Gastrointestinal ROS: No abdominal pain or melena  Genito-Urinary ROS: No dysuria or hematuria  Musculoskeletal ROS: No joint pain but peripheral swelling         Objective:  Blood pressure (!) 154/80, pulse 80, temperature 98.4 °F (36.9 °C), resp. rate 18, height 6' (1.829 m), weight 234 lb 4 oz (106.3 kg), SpO2 97 %. Intake/Output Summary (Last 24 hours) at 11/08/18 0952  Last data filed at 11/08/18 0820   Gross per 24 hour   Intake                0 ml   Output             3800 ml   Net            -3800 ml     General: alert and oriented x3   Chest:  clear to auscultation bilaterally without respiratory distress  CVS: regular rate and rhythm  Abdominal: soft, nontender, normal bowel sounds  Extremities: no cyanosis but peripheral edema  Skin: warm and dry without rash    Labs:  BMP:   Recent Labs      11/05/18   1014  11/08/18   0350   NA  141  137   K  4.7  4.1   CL  104  101   CO2  26  27   BUN  45*  20   CREATININE  2.3*  1.5*   CALCIUM  8.1*  8.1*     CBC:   Recent Labs      11/07/18   1020  11/07/18   1110  11/08/18   0350   WBC  8.0  9.1  6.5   HGB  9.2*  8.9*  8.4*   HCT  29.6*  27.2*  28.2*   MCV  94.3*  90.1  98.3*   PLT  190  158  147     LIVER PROFILE:   Recent Labs      11/05/18   1014   AST  63*   ALT  170*   BILITOT  2.6*   ALKPHOS  287*     PT/INR:   No results for input(s): PROTIME, INR in the last 72 hours. APTT:   No results for input(s): APTT in the last 72 hours. BNP:  No results for input(s): BNP in the last 72 hours. Ionized Calcium:No results for input(s): IONCA in the last 72 hours. Magnesium:No results for input(s): MG in the last 72 hours. Phosphorus:No results for input(s): PHOS in the last 72 hours. HgbA1C: No results for input(s): LABA1C in the last 72 hours.   Hepatic:   Recent Labs      11/05/18   1014   ALKPHOS 287*   ALT  170*   AST  63*   PROT  4.6*   BILITOT  2.6*   LABALBU  2.8*     Lactic Acid: No results for input(s): LACTA in the last 72 hours. Troponin: No results for input(s): CKTOTAL, CKMB, TROPONINT in the last 72 hours. ABGs: No results for input(s): PH, PCO2, PO2, HCO3, O2SAT in the last 72 hours. CRP:  No results for input(s): CRP in the last 72 hours. Sed Rate:  No results for input(s): SEDRATE in the last 72 hours. Cultures:   No results for input(s): CULTURE in the last 72 hours. Radiology reports as per the Radiologist  Radiology: Xr Chest Portable    Result Date: 11/5/2018  EXAMINATION: XR CHEST PORTABLE 11/5/2018 2:28 PM HISTORY: Shortness of breath. Report: Comparison is made with the study from 10/25/2018. The left lung remains clear, there is increased infiltrate and consolidation of the right base which probably represents worsening of pneumonia though an underlying right subhilar mass cannot be excluded. The patient had a chest CT on 10/23/2018 which does not show a mass however. The right internal jugular infusion catheter appears in good position as before. Heart size is normal. No pneumothorax is identified. There are probably small bilateral pleural effusions. Increased consolidation of the right lung base, greatest in the medial lung base most likely related to worsening of pneumonia and small parapneumonic effusion. Signed by Dr Bernadine Vargas. Holly on 11/5/2018 2:30 PM       Assessment   -ANA due to ATN  -CKD stage 3  -Epistaxis  -Anemia of blood loss  -Benign HTN  -Hematuria    Plan:  Dialysis in AM (at his outpatient dialysis unit if discharged). Patient's renal function is improving and he may be tried without dialysis while his drenal function closely followed. Also follow up with urology.

## 2018-11-08 NOTE — PROGRESS NOTES
Nutrition Assessment    Type and Reason for Visit: Reassess    Nutrition Recommendations: continue to monitor for increased diet    Nutrition Assessment: Patient receiving full liquid diet. No questions voiced re: educational material.  PO intake 25-50%. Because of decreased po intake, start Glucerna BID    Malnutrition Assessment:  · Malnutrition Status: At risk for malnutrition  · Context: Acute illness or injury  · Findings of the 6 clinical characteristics of malnutrition (Minimum of 2 out of 6 clinical characteristics is required to make the diagnosis of moderate or severe Protein Calorie Malnutrition based on AND/ASPEN Guidelines):  1. Energy Intake- (50% pst 3 days),  (3 days)    2. Weight Loss-No significant weight loss,    3. Fat Loss-No significant subcutaneous fat loss,    4. Muscle Loss-No significant muscle mass loss,    5. Fluid Accumulation-Moderate to severe fluid accumulation, Extremities  6.  Strength-Not measured    Nutrition Risk Level:  Moderate    Nutrient Needs:  · Estimated Daily Total Kcal: 7515-7678 kcals  · Estimated Daily Protein (g): 106-127g  · Estimated Daily Total Fluid (ml/day): 5713-8212 ml (15-25ml/kg)    Nutrition Diagnosis:   · Problem: Food and nutrition-related knowledge deficit  · Etiology: related to  (related to loss of educational materials)     Signs and symptoms:  as evidenced by Patient report of    Objective Information:  · Nutrition-Focused Physical Findings:   well developed gentleman  · Wound Type: Surgical Wound  · Current Nutrition Therapies:  · Oral Diet Orders: Full Liquid   · Oral Diet intake: 26-50%  · Oral Nutrition Supplement (ONS) Orders: None    · Anthropometric Measures:  · Ht: 6' (182.9 cm)   · Current Body Wt: 234 lb (106.1 kg)  · Admission Body Wt: 233 lb (105.7 kg)  · Ideal Body Wt: 178 lb (80.7 kg),  · BMI Classification: BMI 30.0 - 34.9 Obese Class I    Nutrition Interventions:   Continue current diet  Continued Inpatient

## 2018-11-08 NOTE — DISCHARGE SUMMARY
The osseous structures of the thorax and surrounding soft tissues demonstrate no acute process. Upper abdomen: The imaged portion of the upper abdomen demonstrates no acute process. 1. No evidence of embolic disease. 2. Bilateral pleural effusions are noted. 3. Bibasilar dependent atelectasis. Signed by Dr Evelyne Dan on 10/23/2018 7:39 AM      Disposition:  Discharge back to SNF     Condition at discharge:  Stable    Discharge Instructions/Follow-up:  Please make sure to take all medications as directed. Complete course of levaquin (antibiotics). Follow up with Kidney doctors for further hemodialysis. You will need close monitoring of your blood levels. Code Status:  Full Code     Activity: activity as tolerated    Labs: For convenience and continuity at follow-up the following most recent labs are provided:  CBC:   Recent Labs      11/07/18   1020  11/07/18   1110  11/08/18   0350   WBC  8.0  9.1  6.5   HGB  9.2*  8.9*  8.4*   HCT  29.6*  27.2*  28.2*   PLT  190  158  147       Renal:   Recent Labs      11/08/18   0350   NA  137   K  4.1   CL  101   CO2  27   BUN  20   CREATININE  1.5*   GLUCOSE  123*   CALCIUM  8.1*        Other:  Lab Results   Component Value Date    LABA1C 6.2 (H) 10/26/2018     No results found for: EAG  No results found for: TSHFT4, TSH  Lab Results   Component Value Date    CHOL 145 (L) 10/01/2018     Lab Results   Component Value Date    TRIG 176 (H) 10/01/2018     Lab Results   Component Value Date    HDL 38 (L) 10/01/2018     Lab Results   Component Value Date    LDLCALC 72 10/01/2018     No results found for: LABVLDL, VLDL  No results found for: CHOLHDLRATIO    ABGs:   Lab Results   Component Value Date    PHART 7.430 10/25/2018    PO2ART 85.0 10/25/2018    TEL9ECE 46.0 10/25/2018       Culture:   No results for input(s): BC, BLOODCULT2, ORG in the last 72 hours.     Discharge Medications:   Current Discharge Medication List           Details   levofloxacin (LEVAQUIN) 750 MG tablet Take 1 tablet by mouth daily for 5 days  Qty: 5 tablet, Refills: 0              Details   ! ! polyethylene glycol (GLYCOLAX) packet Take 17 g by mouth daily  Qty: 527 g, Refills: 0      tamsulosin (FLOMAX) 0.4 MG capsule Take 1 capsule by mouth daily  Qty: 30 capsule, Refills: 0      ondansetron (ZOFRAN) 4 MG tablet Take 1 tablet by mouth every 8 hours as needed for Nausea or Vomiting  Qty: 30 tablet, Refills: 0      aspirin 81 MG tablet Take 81 mg by mouth daily      atorvastatin (LIPITOR) 20 MG tablet TK 1 T PO QD  Refills: 1      losartan-hydrochlorothiazide (HYZAAR) 100-25 MG per tablet TK 1 T PO QD  Refills: 3      metoprolol succinate (TOPROL XL) 100 MG extended release tablet Take 100 mg by mouth daily      NIFEdipine (PROCARDIA XL) 60 MG extended release tablet Take 60 mg by mouth daily      pantoprazole (PROTONIX) 40 MG tablet Take 40 mg by mouth daily      ! ! polyethylene glycol (GLYCOLAX) packet 3 times daily on Sunday. Qty: 527 g, Refills: 0       !! - Potential duplicate medications found. Please discuss with provider.         Current Facility-Administered Medications   Medication Dose Route Frequency Provider Last Rate Last Dose    oxymetazoline (AFRIN) 0.05 % nasal spray 2 spray  2 spray Each Nare Once [facility], DO        0.9 % sodium chloride infusion 250 mL  250 mL Intravenous Once ELISABETH Tolbert        ceFEPIme (MAXIPIME) 2 g in sterile water 20 mL IV syringe  2 g Intravenous Q12H Thang Orantes, DO   2 g at 11/08/18 1004    sodium chloride flush 0.9 % injection 10 mL  10 mL Intravenous 2 times per day iKtty Pruitt MD   10 mL at 11/08/18 0954    sodium chloride flush 0.9 % injection 10 mL  10 mL Intravenous PRN Kitty Pruitt MD        acetaminophen (TYLENOL) tablet 650 mg  650 mg Oral Q8H PRN Zaire Richardson MD        metoprolol succinate (TOPROL XL) extended release tablet 100 mg  100 mg Oral Daily Zaire Richardson MD   100 mg at 11/08/18 0954    pantoprazole (PROTONIX) tablet 40 mg  40 mg Oral Daily Zaire Richardson MD   40 mg at 11/08/18 0953    tamsulosin (FLOMAX) capsule 0.4 mg  0.4 mg Oral Daily Zaire Richardson MD   0.4 mg at 11/08/18 4064       Time Spent on discharge is more than 30 minutes in the examination, evaluation, counseling and review of medications and discharge plan. Hugo Yang MD  Internal Medicine Hospitalist    Thank you Vinod Wheeler MD for the opportunity to be involved in this patient's care.  If you have any questions or concerns please feel free to contact me at [phone]

## 2018-11-12 ENCOUNTER — LAB REQUISITION (OUTPATIENT)
Dept: LAB | Facility: HOSPITAL | Age: 78
End: 2018-11-12

## 2018-11-12 DIAGNOSIS — Z00.00 ENCOUNTER FOR GENERAL ADULT MEDICAL EXAMINATION WITHOUT ABNORMAL FINDINGS: ICD-10-CM

## 2018-11-12 LAB
BASOPHILS # BLD AUTO: 0.03 10*3/MM3 (ref 0–0.2)
BASOPHILS NFR BLD AUTO: 0.5 % (ref 0–2)
DEPRECATED RDW RBC AUTO: 56.7 FL (ref 40–54)
EOSINOPHIL # BLD AUTO: 0 10*3/MM3 (ref 0–0.7)
EOSINOPHIL NFR BLD AUTO: 0 % (ref 0–4)
ERYTHROCYTE [DISTWIDTH] IN BLOOD BY AUTOMATED COUNT: 16.5 % (ref 12–15)
HCT VFR BLD AUTO: 26 % (ref 40–52)
HGB BLD-MCNC: 8.2 G/DL (ref 14–18)
IMM GRANULOCYTES # BLD: 0.03 10*3/MM3 (ref 0–0.03)
IMM GRANULOCYTES NFR BLD: 0.5 % (ref 0–5)
LYMPHOCYTES # BLD AUTO: 1.25 10*3/MM3 (ref 0.72–4.86)
LYMPHOCYTES NFR BLD AUTO: 22 % (ref 15–45)
MCH RBC QN AUTO: 29.7 PG (ref 28–32)
MCHC RBC AUTO-ENTMCNC: 31.5 G/DL (ref 33–36)
MCV RBC AUTO: 94.2 FL (ref 82–95)
MONOCYTES # BLD AUTO: 0.72 10*3/MM3 (ref 0.19–1.3)
MONOCYTES NFR BLD AUTO: 12.7 % (ref 4–12)
NEUTROPHILS # BLD AUTO: 3.64 10*3/MM3 (ref 1.87–8.4)
NEUTROPHILS NFR BLD AUTO: 64.3 % (ref 39–78)
NRBC BLD MANUAL-RTO: 0 /100 WBC (ref 0–0)
PLATELET # BLD AUTO: 180 10*3/MM3 (ref 130–400)
PMV BLD AUTO: 12.5 FL (ref 6–12)
RBC # BLD AUTO: 2.76 10*6/MM3 (ref 4.8–5.9)
WBC NRBC COR # BLD: 5.67 10*3/MM3 (ref 4.8–10.8)

## 2018-11-12 PROCEDURE — 36415 COLL VENOUS BLD VENIPUNCTURE: CPT | Performed by: FAMILY MEDICINE

## 2018-11-12 PROCEDURE — 85025 COMPLETE CBC W/AUTO DIFF WBC: CPT | Performed by: FAMILY MEDICINE

## 2018-11-14 ENCOUNTER — PROCEDURE VISIT (OUTPATIENT)
Dept: UROLOGY | Age: 78
End: 2018-11-14
Payer: MEDICARE

## 2018-11-14 VITALS — HEIGHT: 72 IN | TEMPERATURE: 97.1 F | BODY MASS INDEX: 29.8 KG/M2 | WEIGHT: 220 LBS

## 2018-11-14 DIAGNOSIS — N40.1 BENIGN PROSTATIC HYPERPLASIA WITH URINARY RETENTION: Primary | ICD-10-CM

## 2018-11-14 DIAGNOSIS — C67.0 MALIGNANT NEOPLASM OF TRIGONE OF URINARY BLADDER (HCC): ICD-10-CM

## 2018-11-14 DIAGNOSIS — R33.8 BENIGN PROSTATIC HYPERPLASIA WITH URINARY RETENTION: Primary | ICD-10-CM

## 2018-11-14 DIAGNOSIS — R31.0 GROSS HEMATURIA: ICD-10-CM

## 2018-11-14 PROCEDURE — G8417 CALC BMI ABV UP PARAM F/U: HCPCS | Performed by: UROLOGY

## 2018-11-14 PROCEDURE — G8484 FLU IMMUNIZE NO ADMIN: HCPCS | Performed by: UROLOGY

## 2018-11-14 PROCEDURE — 4040F PNEUMOC VAC/ADMIN/RCVD: CPT | Performed by: UROLOGY

## 2018-11-14 PROCEDURE — 52000 CYSTOURETHROSCOPY: CPT | Performed by: UROLOGY

## 2018-11-14 PROCEDURE — 1036F TOBACCO NON-USER: CPT | Performed by: UROLOGY

## 2018-11-14 PROCEDURE — 99214 OFFICE O/P EST MOD 30 MIN: CPT | Performed by: UROLOGY

## 2018-11-14 PROCEDURE — 1111F DSCHRG MED/CURRENT MED MERGE: CPT | Performed by: UROLOGY

## 2018-11-14 PROCEDURE — G8427 DOCREV CUR MEDS BY ELIG CLIN: HCPCS | Performed by: UROLOGY

## 2018-11-14 PROCEDURE — G8598 ASA/ANTIPLAT THER USED: HCPCS | Performed by: UROLOGY

## 2018-11-14 PROCEDURE — 1101F PT FALLS ASSESS-DOCD LE1/YR: CPT | Performed by: UROLOGY

## 2018-11-14 PROCEDURE — 1123F ACP DISCUSS/DSCN MKR DOCD: CPT | Performed by: UROLOGY

## 2018-11-14 RX ORDER — MUPIROCIN CALCIUM 20 MG/G
CREAM TOPICAL
Refills: 0 | COMMUNITY
Start: 2018-10-08 | End: 2018-12-11

## 2018-11-14 ASSESSMENT — ENCOUNTER SYMPTOMS
ALLERGIC/IMMUNOLOGIC NEGATIVE: 1
RESPIRATORY NEGATIVE: 1
GASTROINTESTINAL NEGATIVE: 1
EYES NEGATIVE: 1

## 2018-11-14 NOTE — PROGRESS NOTES
Date    Acute liver failure without hepatic coma 10/23/2018    Back pain     \"with tired legs as a result\"    Blood circulation, collateral     Carotid arterial disease ([facility] [address]     recent surgery    CHF (congestive heart failure) ([facility] [address] 10/23/2018    CKD (chronic kidney disease), 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/2018    GERD (gastroesophageal reflux disease)     Hyperlipidemia     Hypertension     Hypertension     Palliative care patient 10/23/2018    Pneumonia due to infectious organism 11/6/2018    Primary osteoarthritis of left knee 10/14/2018    PVD [facility]peripheral vascular disease) [facility])        Past Surgical History:   Procedure Laterality Date    BACK SURGERY      COLONOSCOPY  2007?  [place] COLONOSCOPY FLX DX W/COLLJ SPEC WHEN PFRMD N/A 9/11/2017    Dr Royal Paulino internal hemorrhoids, diverticular disease-HP-No recall (age)   [facility] [place] REVISE MEDIAN N/CARPAL TUNNEL SURG Left 7/18/2018    OPEN CARPAL TUNNEL RELEASE performed by Barrett Keller MD at [address] Left 8/28/2018    LEFT CAROTID ENDARTERECTOMY WITH VEIN PATCH ANGIOPLASTY AND COMPLETION ANGIOGRAM performed by Raul Aaron MD at [facility]. Left 10/15/2018    LEFT COMPLEX TOTAL KNEE ARTHROPLASTY performed by Barrett Keller MD at [facility] VASCULAR SURGERY  04/21/2015    Margarito Babcock Ultrasound guided access of left common femoral artery. Aortogram.Diagnostic right lower extremity arteriogram.Radiologic supervision and interpretation.  VASCULAR SURGERY  01/13/2015    Margarito Graham M.D Atherectomy,angioplasty,and stenting of left superficial femoral artery.  VASCULAR SURGERY  03/11/2014    Margarito Graham M.D. Ultrasound-guided access of right common femoral artery. Aortogram.Left lower extremity arteriogram.Atherectomy and angioplasty of left superficial femoral artery. Radiologic supervision and interpretation.     VASCULAR SURGERY  01/18/2013 Glasses of wine per week      Comment: 2 glasses of wine every night    Drug use: No    Sexual activity: Yes     Partners: Female     Other Topics Concern    None     Social History Narrative    None       Family History   Problem Relation Age of Onset    Colon Cancer Father     Diabetes Brother     Colon Polyps Neg Hx     Liver Cancer Neg Hx     Liver Disease Neg Hx     Esophageal Cancer Neg Hx     Rectal Cancer Neg Hx     Stomach Cancer Neg Hx        REVIEW OF SYSTEMS:  Review of Systems   Constitutional: Negative. HENT: Negative. Eyes: Negative. Respiratory: Negative. Cardiovascular: Negative. Gastrointestinal: Negative. Genitourinary: Positive for hematuria. Negative for dysuria. Urinary retention and has a Shultz catheter   Musculoskeletal: Positive for joint swelling (status post left knee replacement). Allergic/Immunologic: Negative. Neurological: Negative. Hematological: Negative. Psychiatric/Behavioral: Negative. PHYSICAL EXAM:  Temp 97.1 °F (36.2 °C) (Temporal)   Ht 6' (1.829 m)   Wt 220 lb (99.8 kg)   BMI 29.84 kg/m²   Physical Exam   Constitutional: He is oriented to person, place, and time. He appears well-developed and well-nourished. No distress. HENT:   Head: Normocephalic and atraumatic. Nose: Nose normal.   Eyes: Pupils are equal, round, and reactive to light. Conjunctivae and EOM are normal. No scleral icterus. Neck: Normal range of motion. Neck supple. No tracheal deviation present. Cardiovascular: Normal rate, regular rhythm and intact distal pulses. Pulmonary/Chest: Effort normal and breath sounds normal. No stridor. He exhibits no tenderness. Abdominal: Soft. Bowel sounds are normal. He exhibits no distension and no mass. There is no tenderness. Musculoskeletal: Normal range of motion. He exhibits no edema or tenderness. Lymphadenopathy:     He has no cervical adenopathy.    Neurological: He is alert and oriented to

## 2018-11-15 ENCOUNTER — LAB REQUISITION (OUTPATIENT)
Dept: LAB | Facility: HOSPITAL | Age: 78
End: 2018-11-15

## 2018-11-15 DIAGNOSIS — Z00.00 ENCOUNTER FOR GENERAL ADULT MEDICAL EXAMINATION WITHOUT ABNORMAL FINDINGS: ICD-10-CM

## 2018-11-15 LAB
BASOPHILS # BLD AUTO: 0.03 10*3/MM3 (ref 0–0.2)
BASOPHILS NFR BLD AUTO: 0.6 % (ref 0–2)
DEPRECATED RDW RBC AUTO: 53.9 FL (ref 40–54)
EOSINOPHIL # BLD AUTO: 0 10*3/MM3 (ref 0–0.7)
EOSINOPHIL NFR BLD AUTO: 0 % (ref 0–4)
ERYTHROCYTE [DISTWIDTH] IN BLOOD BY AUTOMATED COUNT: 15.9 % (ref 12–15)
HCT VFR BLD AUTO: 25.9 % (ref 40–52)
HGB BLD-MCNC: 8.4 G/DL (ref 14–18)
IMM GRANULOCYTES # BLD: 0.03 10*3/MM3 (ref 0–0.03)
IMM GRANULOCYTES NFR BLD: 0.6 % (ref 0–5)
LYMPHOCYTES # BLD AUTO: 1.02 10*3/MM3 (ref 0.72–4.86)
LYMPHOCYTES NFR BLD AUTO: 19.1 % (ref 15–45)
MCH RBC QN AUTO: 29.8 PG (ref 28–32)
MCHC RBC AUTO-ENTMCNC: 32.4 G/DL (ref 33–36)
MCV RBC AUTO: 91.8 FL (ref 82–95)
MONOCYTES # BLD AUTO: 0.74 10*3/MM3 (ref 0.19–1.3)
MONOCYTES NFR BLD AUTO: 13.9 % (ref 4–12)
NEUTROPHILS # BLD AUTO: 3.51 10*3/MM3 (ref 1.87–8.4)
NEUTROPHILS NFR BLD AUTO: 65.8 % (ref 39–78)
NRBC BLD MANUAL-RTO: 0 /100 WBC (ref 0–0)
PLATELET # BLD AUTO: 169 10*3/MM3 (ref 130–400)
PMV BLD AUTO: 12.1 FL (ref 6–12)
RBC # BLD AUTO: 2.82 10*6/MM3 (ref 4.8–5.9)
WBC NRBC COR # BLD: 5.33 10*3/MM3 (ref 4.8–10.8)

## 2018-11-15 PROCEDURE — 36415 COLL VENOUS BLD VENIPUNCTURE: CPT | Performed by: FAMILY MEDICINE

## 2018-11-15 PROCEDURE — 85025 COMPLETE CBC W/AUTO DIFF WBC: CPT | Performed by: FAMILY MEDICINE

## 2018-11-19 ENCOUNTER — LAB REQUISITION (OUTPATIENT)
Dept: LAB | Facility: HOSPITAL | Age: 78
End: 2018-11-19

## 2018-11-19 DIAGNOSIS — Z00.00 ENCOUNTER FOR GENERAL ADULT MEDICAL EXAMINATION WITHOUT ABNORMAL FINDINGS: ICD-10-CM

## 2018-11-19 LAB
ANION GAP SERPL CALCULATED.3IONS-SCNC: 10 MMOL/L (ref 4–13)
BASOPHILS # BLD AUTO: 0.04 10*3/MM3 (ref 0–0.2)
BASOPHILS NFR BLD AUTO: 0.7 % (ref 0–2)
BUN BLD-MCNC: 17 MG/DL (ref 5–21)
BUN/CREAT SERPL: 11.8 (ref 7–25)
CALCIUM SPEC-SCNC: 8.2 MG/DL (ref 8.4–10.4)
CHLORIDE SERPL-SCNC: 101 MMOL/L (ref 98–110)
CO2 SERPL-SCNC: 32 MMOL/L (ref 24–31)
CREAT BLD-MCNC: 1.44 MG/DL (ref 0.5–1.4)
DEPRECATED RDW RBC AUTO: 52 FL (ref 40–54)
EOSINOPHIL # BLD AUTO: 0 10*3/MM3 (ref 0–0.7)
EOSINOPHIL NFR BLD AUTO: 0 % (ref 0–4)
ERYTHROCYTE [DISTWIDTH] IN BLOOD BY AUTOMATED COUNT: 15 % (ref 12–15)
GFR SERPL CREATININE-BSD FRML MDRD: 48 ML/MIN/1.73
GFR SERPL CREATININE-BSD FRML MDRD: 58 ML/MIN/1.73
GLUCOSE BLD-MCNC: 107 MG/DL (ref 70–100)
HCT VFR BLD AUTO: 30 % (ref 40–52)
HGB BLD-MCNC: 9.6 G/DL (ref 14–18)
IMM GRANULOCYTES # BLD: 0.03 10*3/MM3 (ref 0–0.03)
IMM GRANULOCYTES NFR BLD: 0.5 % (ref 0–5)
LYMPHOCYTES # BLD AUTO: 1.23 10*3/MM3 (ref 0.72–4.86)
LYMPHOCYTES NFR BLD AUTO: 20.8 % (ref 15–45)
MCH RBC QN AUTO: 29.9 PG (ref 28–32)
MCHC RBC AUTO-ENTMCNC: 32 G/DL (ref 33–36)
MCV RBC AUTO: 93.5 FL (ref 82–95)
MONOCYTES # BLD AUTO: 0.67 10*3/MM3 (ref 0.19–1.3)
MONOCYTES NFR BLD AUTO: 11.3 % (ref 4–12)
NEUTROPHILS # BLD AUTO: 3.95 10*3/MM3 (ref 1.87–8.4)
NEUTROPHILS NFR BLD AUTO: 66.7 % (ref 39–78)
NRBC BLD MANUAL-RTO: 0 /100 WBC (ref 0–0)
PLATELET # BLD AUTO: 154 10*3/MM3 (ref 130–400)
PMV BLD AUTO: 11.6 FL (ref 6–12)
POTASSIUM BLD-SCNC: 3.2 MMOL/L (ref 3.5–5.3)
RBC # BLD AUTO: 3.21 10*6/MM3 (ref 4.8–5.9)
SODIUM BLD-SCNC: 143 MMOL/L (ref 135–145)
WBC NRBC COR # BLD: 5.92 10*3/MM3 (ref 4.8–10.8)

## 2018-11-19 PROCEDURE — 36415 COLL VENOUS BLD VENIPUNCTURE: CPT | Performed by: FAMILY MEDICINE

## 2018-11-19 PROCEDURE — 80048 BASIC METABOLIC PNL TOTAL CA: CPT | Performed by: FAMILY MEDICINE

## 2018-11-19 PROCEDURE — 85025 COMPLETE CBC W/AUTO DIFF WBC: CPT | Performed by: FAMILY MEDICINE

## 2018-11-26 LAB
24HR URINE VOLUME (ML): 700 ML
BUN BLDV-MCNC: 15 MG/DL (ref 8–23)
CREAT SERPL-MCNC: 1.6 MG/DL
CREAT SERPL-MCNC: 1.6 MG/DL (ref 0.5–1.2)
CREATININE 24 HOUR URINE: 1 G/24HR (ref 1–2)
CREATININE CLEARANCE: 33 ML/MIN (ref 71–151)
GFR NON-AFRICAN AMERICAN: 42
Lab: 24 HR

## 2018-12-02 PROBLEM — R79.89 ELEVATED TROPONIN: Status: RESOLVED | Noted: 2018-10-23 | Resolved: 2018-12-02

## 2018-12-02 PROBLEM — R77.8 ELEVATED TROPONIN: Status: RESOLVED | Noted: 2018-10-23 | Resolved: 2018-12-02

## 2018-12-07 ENCOUNTER — TELEPHONE (OUTPATIENT)
Dept: VASCULAR SURGERY | Age: 78
End: 2018-12-07

## 2018-12-07 NOTE — TELEPHONE ENCOUNTER
Due to no longer needing [facility], kidneys have reversed Pt has been scheduled for [facility] Cath Removal. eTagan Jaimes at [facility] has been notified of patients upcoming appointment for [facility] HOSPITAL Removal with [facility]. Patient is to arrive at [address] [place] [address]   On 12/17/18 at 6:00am. Details/Instructions (See letter) have been reviewed with Teagna Jaimes and a written copy has been successfully faxed to Teagan Jaimes to review with patient when notifying. Teagan Jaimes voiced understanding.  OB

## 2018-12-10 ENCOUNTER — TELEPHONE (OUTPATIENT)
Dept: INPATIENT UNIT | Age: 78
End: 2018-12-10

## 2018-12-10 ENCOUNTER — TELEPHONE (OUTPATIENT)
Dept: UROLOGY | Age: 78
End: 2018-12-10

## 2018-12-11 ENCOUNTER — HOSPITAL ENCOUNTER (OUTPATIENT)
Dept: PREADMISSION TESTING | Age: 78
Discharge: HOME | End: 2018-12-15
Payer: MEDICARE

## 2018-12-11 ENCOUNTER — HOSPITAL ENCOUNTER (OUTPATIENT)
Dept: GENERAL RADIOLOGY | Age: 78
Discharge: HOME | End: 2018-12-11
Payer: MEDICARE

## 2018-12-11 VITALS — WEIGHT: 216 LBS | HEIGHT: 72 IN | BODY MASS INDEX: 29.26 KG/M2

## 2018-12-11 LAB
ANION GAP SERPL CALCULATED.3IONS-SCNC: 20 MMOL/L (ref 7–19)
APTT: 28.6 SEC (ref 26–36.2)
BASOPHILS ABSOLUTE: 0 K/UL (ref 0–0.2)
BASOPHILS RELATIVE PERCENT: 0.4 % (ref 0–1)
BUN BLDV-MCNC: 25 MG/DL (ref 8–23)
CALCIUM SERPL-MCNC: 8.9 MG/DL (ref 8.8–10.2)
CHLORIDE BLD-SCNC: 104 MMOL/L (ref 98–111)
CO2: 21 MMOL/L (ref 22–29)
CREAT SERPL-MCNC: 2.1 MG/DL (ref 0.5–1.2)
EOSINOPHILS ABSOLUTE: 0 K/UL (ref 0–0.6)
EOSINOPHILS RELATIVE PERCENT: 0.1 % (ref 0–5)
GFR NON-AFRICAN AMERICAN: 31
GLUCOSE BLD-MCNC: 163 MG/DL (ref 74–109)
HCT VFR BLD CALC: 35.9 % (ref 42–52)
HEMOGLOBIN: 11.6 G/DL (ref 14–18)
INR BLD: 1.14 (ref 0.88–1.18)
LYMPHOCYTES ABSOLUTE: 1.5 K/UL (ref 1.1–4.5)
LYMPHOCYTES RELATIVE PERCENT: 21.5 % (ref 20–40)
MCH RBC QN AUTO: 29.6 PG (ref 27–31)
MCHC RBC AUTO-ENTMCNC: 32.3 G/DL (ref 33–37)
MCV RBC AUTO: 91.6 FL (ref 80–94)
MONOCYTES ABSOLUTE: 0.6 K/UL (ref 0–0.9)
MONOCYTES RELATIVE PERCENT: 8.6 % (ref 0–10)
NEUTROPHILS ABSOLUTE: 4.8 K/UL (ref 1.5–7.5)
NEUTROPHILS RELATIVE PERCENT: 69.1 % (ref 50–65)
PDW BLD-RTO: 13.3 % (ref 11.5–14.5)
PLATELET # BLD: 159 K/UL (ref 130–400)
PMV BLD AUTO: 11.8 FL (ref 9.4–12.4)
POTASSIUM SERPL-SCNC: 3.8 MMOL/L (ref 3.5–5)
PROTHROMBIN TIME: 14 SEC (ref 12–14.6)
RBC # BLD: 3.92 M/UL (ref 4.7–6.1)
SODIUM BLD-SCNC: 145 MMOL/L (ref 136–145)
WBC # BLD: 7 K/UL (ref 4.8–10.8)

## 2018-12-11 PROCEDURE — 85610 PROTHROMBIN TIME: CPT

## 2018-12-11 PROCEDURE — 85025 COMPLETE CBC W/AUTO DIFF WBC: CPT

## 2018-12-11 PROCEDURE — 85730 THROMBOPLASTIN TIME PARTIAL: CPT

## 2018-12-11 PROCEDURE — 71046 X-RAY EXAM CHEST 2 VIEWS: CPT

## 2018-12-11 PROCEDURE — 80048 BASIC METABOLIC PNL TOTAL CA: CPT

## 2018-12-11 PROCEDURE — 93005 ELECTROCARDIOGRAM TRACING: CPT

## 2018-12-11 RX ORDER — CIPROFLOXACIN 2 MG/ML
400 INJECTION, SOLUTION INTRAVENOUS ONCE
Status: CANCELLED | OUTPATIENT
Start: 2018-12-19

## 2018-12-11 RX ORDER — TAMSULOSIN HYDROCHLORIDE 0.4 MG/1
0.4 CAPSULE ORAL DAILY
Status: ON HOLD | COMMUNITY
End: 2018-12-19 | Stop reason: HOSPADM

## 2018-12-11 RX ORDER — ATORVASTATIN CALCIUM 20 MG/1
20 TABLET, FILM COATED ORAL DAILY
Status: ON HOLD | COMMUNITY
End: 2021-01-29 | Stop reason: SDUPTHER

## 2018-12-11 RX ORDER — PANTOPRAZOLE SODIUM 40 MG/1
40 TABLET, DELAYED RELEASE ORAL DAILY
Status: ON HOLD | COMMUNITY
End: 2021-01-29 | Stop reason: HOSPADM

## 2018-12-11 RX ORDER — LOSARTAN POTASSIUM AND HYDROCHLOROTHIAZIDE 25; 100 MG/1; MG/1
1 TABLET ORAL DAILY
COMMUNITY
End: 2021-01-17

## 2018-12-12 LAB
EKG P AXIS: 21 DEGREES
EKG P-R INTERVAL: 194 MS
EKG Q-T INTERVAL: 384 MS
EKG QRS DURATION: 100 MS
EKG QTC CALCULATION (BAZETT): 408 MS
EKG T AXIS: 44 DEGREES

## 2018-12-13 ENCOUNTER — PREP FOR PROCEDURE (OUTPATIENT)
Dept: VASCULAR SURGERY | Age: 78
End: 2018-12-13

## 2018-12-13 ENCOUNTER — TELEPHONE (OUTPATIENT)
Dept: UROLOGY | Age: 78
End: 2018-12-13

## 2018-12-13 RX ORDER — LIDOCAINE HYDROCHLORIDE 10 MG/ML
20 INJECTION, SOLUTION EPIDURAL; INFILTRATION; INTRACAUDAL; PERINEURAL ONCE
Status: CANCELLED | OUTPATIENT
Start: 2018-12-13 | End: 2018-12-13

## 2018-12-13 NOTE — H&P
Surgical History:   Procedure Laterality Date    BACK SURGERY      COLONOSCOPY  2007?  [place] COLONOSCOPY FLX DX W/COLLJ SPEC WHEN PFRMD N/A 9/11/2017    Dr Emre Mckeon internal hemorrhoids, diverticular disease-HP-No recall (age)   Alan Meraz [place] REVISE MEDIAN N/CARPAL TUNNEL SURG Left 7/18/2018    OPEN CARPAL TUNNEL RELEASE performed by Ivan Soliman MD at [address] Left 8/28/2018    LEFT CAROTID ENDARTERECTOMY WITH VEIN PATCH ANGIOPLASTY AND COMPLETION ANGIOGRAM performed by Kennedi Fernandez MD at [address] [facility] Left 10/15/2018    LEFT COMPLEX TOTAL KNEE ARTHROPLASTY performed by Ivan Soliman MD at [facility] VASCULAR SURGERY  04/21/2015    Latasha BESS Ultrasound guided access of left common femoral artery. Aortogram.Diagnostic right lower extremity arteriogram.Radiologic supervision and interpretation.  VASCULAR SURGERY  01/13/2015    Latasha Wise M.D Atherectomy,angioplasty,and stenting of left superficial femoral artery.  VASCULAR SURGERY  03/11/2014    Latasha Wise M.D. Ultrasound-guided access of right common femoral artery. Aortogram.Left lower extremity arteriogram.Atherectomy and angioplasty of left superficial femoral artery. Radiologic supervision and interpretation.  VASCULAR SURGERY  01/18/2013    Latasha BESS Aortogram.Multistation arteriogram right lower extremity. Laser atherectomy and angioplasty of right superficial femoral artery. Selective catheterization of right tibioperoneal trunk. Angioplasty of peroneal artery and tibioperoneal trunk.  VASCULAR SURGERY  10/30/2018    [facility]. Ultrasound guided cannulation of right internal vein. Placement of right internal jugular vein tunneled dialysis catheter TwoFish [id number] xk 23cm tip to cuff      Family History   Problem Relation Age of Onset    Colon Cancer Father     Diabetes Brother     Colon Polyps Neg Hx     Liver Cancer Neg Hx     Liver Disease Neg Hx     Esophageal Cancer Neg Hx     Rectal Cancer Neg Hx     Stomach Cancer Neg Hx       Social History   Substance Use Topics    Smoking status: Former Smoker     Quit date: 6/3/2003    Smokeless tobacco: Never Used    Alcohol use 7.2 oz/week     12 Glasses of wine per week      Comment: 2 glasses of wine every night     PE:  NAD  CTA B  RRR  Right IJV permcath without sign of infection    A/P:    ARF with underlying Stage IV CKD. Kidney function back to baseline. Permit for permacath removal    Risks have been reviewed with the patient including but not limited to heart attack, death, CVA, bleeding, nerve injury, infection, steal, pain, and need for further surgery.       _______________________________________________________________________  Signature     Date    Time

## 2018-12-17 ENCOUNTER — HOSPITAL ENCOUNTER (OUTPATIENT)
Dept: OTHER | Age: 78
Discharge: HOME | End: 2018-12-17
Payer: MEDICARE

## 2018-12-17 VITALS
HEART RATE: 63 BPM | TEMPERATURE: 99.1 F | RESPIRATION RATE: 22 BRPM | BODY MASS INDEX: 27.63 KG/M2 | HEIGHT: 72 IN | OXYGEN SATURATION: 99 % | DIASTOLIC BLOOD PRESSURE: 68 MMHG | WEIGHT: 204 LBS | SYSTOLIC BLOOD PRESSURE: 137 MMHG

## 2018-12-17 PROBLEM — N18.2 CKD (CHRONIC KIDNEY DISEASE), STAGE II: Status: RESOLVED | Noted: 2018-10-15 | Resolved: 2018-12-17

## 2018-12-17 PROCEDURE — 36589 REMOVAL TUNNELED CV CATH: CPT

## 2018-12-17 PROCEDURE — 2500000003 HC RX 250 WO HCPCS: Performed by: SURGERY

## 2018-12-17 PROCEDURE — 6360000002 HC RX W HCPCS

## 2018-12-17 PROCEDURE — 36589 REMOVAL TUNNELED CV CATH: CPT | Performed by: SURGERY

## 2018-12-17 RX ORDER — LIDOCAINE HYDROCHLORIDE 10 MG/ML
30 INJECTION, SOLUTION EPIDURAL; INFILTRATION; INTRACAUDAL; PERINEURAL ONCE
Status: COMPLETED | OUTPATIENT
Start: 2018-12-17 | End: 2018-12-17

## 2018-12-17 RX ORDER — HYDROCODONE BITARTRATE AND ACETAMINOPHEN 5; 325 MG/1; MG/1
1 TABLET ORAL EVERY 4 HOURS PRN
Status: DISCONTINUED | OUTPATIENT
Start: 2018-12-17 | End: 2018-12-19 | Stop reason: HOSPADM

## 2018-12-17 RX ORDER — LIDOCAINE HYDROCHLORIDE 10 MG/ML
20 INJECTION, SOLUTION EPIDURAL; INFILTRATION; INTRACAUDAL; PERINEURAL ONCE
Status: DISCONTINUED | OUTPATIENT
Start: 2018-12-17 | End: 2018-12-17

## 2018-12-17 RX ORDER — ACETAMINOPHEN 325 MG/1
650 TABLET ORAL EVERY 4 HOURS PRN
Status: DISCONTINUED | OUTPATIENT
Start: 2018-12-17 | End: 2018-12-19 | Stop reason: HOSPADM

## 2018-12-17 RX ORDER — HYDROCODONE BITARTRATE AND ACETAMINOPHEN 5; 325 MG/1; MG/1
2 TABLET ORAL EVERY 4 HOURS PRN
Status: DISCONTINUED | OUTPATIENT
Start: 2018-12-17 | End: 2018-12-19 | Stop reason: HOSPADM

## 2018-12-17 RX ORDER — ONDANSETRON 2 MG/ML
4 INJECTION INTRAMUSCULAR; INTRAVENOUS EVERY 8 HOURS PRN
Status: DISCONTINUED | OUTPATIENT
Start: 2018-12-17 | End: 2018-12-19 | Stop reason: HOSPADM

## 2018-12-17 RX ADMIN — LIDOCAINE HYDROCHLORIDE 10 ML: 10 INJECTION, SOLUTION EPIDURAL; INFILTRATION; INTRACAUDAL; PERINEURAL at 08:15

## 2018-12-17 NOTE — H&P (VIEW-ONLY)
Surgical History:   Procedure Laterality Date    BACK SURGERY      COLONOSCOPY  2007?  MI COLONOSCOPY FLX DX W/COLLJ SPEC WHEN PFRMD N/A 9/11/2017    Dr Erica Rossi internal hemorrhoids, diverticular disease-HP-No recall (age)   Marylu Hamman MI REVISE MEDIAN N/CARPAL TUNNEL SURG Left 7/18/2018    OPEN CARPAL TUNNEL RELEASE performed by Eladio Machado MD at [address] Left 8/28/2018    LEFT CAROTID ENDARTERECTOMY WITH VEIN PATCH ANGIOPLASTY AND COMPLETION ANGIOGRAM performed by Olga Lang MD at [place] [address] Left 10/15/2018    LEFT COMPLEX TOTAL KNEE ARTHROPLASTY performed by Eladio Machado MD at [facility] VASCULAR SURGERY  04/21/2015    Marisol BESS Ultrasound guided access of left common femoral artery. Aortogram.Diagnostic right lower extremity arteriogram.Radiologic supervision and interpretation.  VASCULAR SURGERY  01/13/2015    Marisol Lai M.D Atherectomy,angioplasty,and stenting of left superficial femoral artery.  VASCULAR SURGERY  03/11/2014    Marisol Lai M.D. Ultrasound-guided access of right common femoral artery. Aortogram.Left lower extremity arteriogram.Atherectomy and angioplasty of left superficial femoral artery. Radiologic supervision and interpretation.  VASCULAR SURGERY  01/18/2013    Marisol BESS Aortogram.Multistation arteriogram right lower extremity. Laser atherectomy and angioplasty of right superficial femoral artery. Selective catheterization of right tibioperoneal trunk. Angioplasty of peroneal artery and tibioperoneal trunk.  VASCULAR SURGERY  10/30/2018    SJS. Ultrasound guided cannulation of right internal vein. Placement of right internal jugular vein tunneled dialysis catheter Yuppics equHotPadseam xk 23cm tip to cuff      Family History   Problem Relation Age of Onset    Colon Cancer Father     Diabetes Brother     Colon Polyps Neg Hx     Liver Cancer Neg Hx    

## 2018-12-17 NOTE — OP NOTE
Preprocedure Diagnosis:    1. Acute renal failure has recovered back to baseline  2. CKD (stage IV)    Postprocedure Diagnosis:  Same    Procedure:  Removal of tunneled dialysis catheter right internal jugular vein    Surgeon:  Michelle Honeycutt M.D. Anesthesia:  Local (1% lidocaine without epinephrine)    EBL:  Less than 50 ml    Findings: The cuff and catheter were completely removed. There were no signs of infection. Procedure in Detail:      After the patient was consented, the right neck and chest were prepped and draped in the usual sterile fashion. Skin and subcutaneous tissues around the exit site and over the cuff were anesthetized with lidocaine. Using a #15 blade, an incision was made around the exit site and the cuff is dissected away from the well incorporated subcutaneous tissues with sharp dissection. The catheter and cuff were then completely removed and pressure was held at the base of the neck for hemostasis. The wound/exit site was then closed with interrupted 3-0 nylon sutures. A sterile dressing was applied. The patient tolerated the procedure well. Sutures can be removed at the dialysis clinic in 1-2 weeks. We can remove the sutures if the patient no longer requires dialysis.

## 2018-12-19 ENCOUNTER — ANESTHESIA EVENT (OUTPATIENT)
Dept: OPERATING ROOM | Age: 78
End: 2018-12-19
Payer: MEDICARE

## 2018-12-19 ENCOUNTER — APPOINTMENT (OUTPATIENT)
Dept: GENERAL RADIOLOGY | Age: 78
End: 2018-12-19
Attending: UROLOGY
Payer: MEDICARE

## 2018-12-19 ENCOUNTER — HOSPITAL ENCOUNTER (OUTPATIENT)
Age: 78
Setting detail: OUTPATIENT SURGERY
Discharge: HOME | End: 2018-12-19
Attending: UROLOGY | Admitting: UROLOGY
Payer: MEDICARE

## 2018-12-19 ENCOUNTER — ANESTHESIA (OUTPATIENT)
Dept: OPERATING ROOM | Age: 78
End: 2018-12-19
Payer: MEDICARE

## 2018-12-19 VITALS
WEIGHT: 205 LBS | DIASTOLIC BLOOD PRESSURE: 72 MMHG | HEART RATE: 63 BPM | SYSTOLIC BLOOD PRESSURE: 154 MMHG | OXYGEN SATURATION: 97 % | RESPIRATION RATE: 16 BRPM | BODY MASS INDEX: 27.77 KG/M2 | HEIGHT: 72 IN | TEMPERATURE: 98.1 F

## 2018-12-19 VITALS
TEMPERATURE: 98 F | RESPIRATION RATE: 12 BRPM | DIASTOLIC BLOOD PRESSURE: 77 MMHG | OXYGEN SATURATION: 100 % | SYSTOLIC BLOOD PRESSURE: 139 MMHG

## 2018-12-19 DIAGNOSIS — G89.18 POSTOPERATIVE PAIN: Primary | ICD-10-CM

## 2018-12-19 PROBLEM — C67.9 BLADDER CANCER (HCC): Status: ACTIVE | Noted: 2018-12-19

## 2018-12-19 LAB
GLUCOSE BLD-MCNC: 123 MG/DL (ref 70–99)
PERFORMED ON: ABNORMAL

## 2018-12-19 PROCEDURE — 6360000004 HC RX CONTRAST MEDICATION: Performed by: UROLOGY

## 2018-12-19 PROCEDURE — 2500000003 HC RX 250 WO HCPCS: Performed by: NURSE ANESTHETIST, CERTIFIED REGISTERED

## 2018-12-19 PROCEDURE — 7100000010 HC PHASE II RECOVERY - FIRST 15 MIN: Performed by: UROLOGY

## 2018-12-19 PROCEDURE — 52005 CYSTO W/URTRL CATHJ: CPT | Performed by: UROLOGY

## 2018-12-19 PROCEDURE — 2580000003 HC RX 258: Performed by: UROLOGY

## 2018-12-19 PROCEDURE — 6360000002 HC RX W HCPCS: Performed by: NURSE ANESTHETIST, CERTIFIED REGISTERED

## 2018-12-19 PROCEDURE — 3209999900 FLUORO FOR SURGICAL PROCEDURES

## 2018-12-19 PROCEDURE — 74420 UROGRAPHY RTRGR +-KUB: CPT

## 2018-12-19 PROCEDURE — 3600000004 HC SURGERY LEVEL 4 BASE: Performed by: UROLOGY

## 2018-12-19 PROCEDURE — 3700000001 HC ADD 15 MINUTES (ANESTHESIA): Performed by: UROLOGY

## 2018-12-19 PROCEDURE — C1769 GUIDE WIRE: HCPCS | Performed by: UROLOGY

## 2018-12-19 PROCEDURE — 7100000011 HC PHASE II RECOVERY - ADDTL 15 MIN: Performed by: UROLOGY

## 2018-12-19 PROCEDURE — 3700000000 HC ANESTHESIA ATTENDED CARE: Performed by: UROLOGY

## 2018-12-19 PROCEDURE — 6370000000 HC RX 637 (ALT 250 FOR IP): Performed by: UROLOGY

## 2018-12-19 PROCEDURE — 52234 CYSTOSCOPY AND TREATMENT: CPT | Performed by: UROLOGY

## 2018-12-19 PROCEDURE — 2709999900 HC NON-CHARGEABLE SUPPLY: Performed by: UROLOGY

## 2018-12-19 PROCEDURE — 3600000014 HC SURGERY LEVEL 4 ADDTL 15MIN: Performed by: UROLOGY

## 2018-12-19 PROCEDURE — 2580000003 HC RX 258: Performed by: ANESTHESIOLOGY

## 2018-12-19 PROCEDURE — 7100000001 HC PACU RECOVERY - ADDTL 15 MIN: Performed by: UROLOGY

## 2018-12-19 PROCEDURE — 6360000002 HC RX W HCPCS: Performed by: UROLOGY

## 2018-12-19 PROCEDURE — 7100000000 HC PACU RECOVERY - FIRST 15 MIN: Performed by: UROLOGY

## 2018-12-19 PROCEDURE — C1758 CATHETER, URETERAL: HCPCS | Performed by: UROLOGY

## 2018-12-19 PROCEDURE — 74420 UROGRAPHY RTRGR +-KUB: CPT | Performed by: UROLOGY

## 2018-12-19 PROCEDURE — 82948 REAGENT STRIP/BLOOD GLUCOSE: CPT

## 2018-12-19 PROCEDURE — 88305 TISSUE EXAM BY PATHOLOGIST: CPT

## 2018-12-19 RX ORDER — MORPHINE SULFATE 2 MG/ML
2 INJECTION, SOLUTION INTRAMUSCULAR; INTRAVENOUS EVERY 5 MIN PRN
Status: DISCONTINUED | OUTPATIENT
Start: 2018-12-19 | End: 2018-12-19 | Stop reason: HOSPADM

## 2018-12-19 RX ORDER — LIDOCAINE HYDROCHLORIDE 10 MG/ML
1 INJECTION, SOLUTION EPIDURAL; INFILTRATION; INTRACAUDAL; PERINEURAL
Status: DISCONTINUED | OUTPATIENT
Start: 2018-12-19 | End: 2018-12-19 | Stop reason: HOSPADM

## 2018-12-19 RX ORDER — ATROPA BELLADONNA AND OPIUM 16.2; 6 MG/1; MG/1
SUPPOSITORY RECTAL PRN
Status: DISCONTINUED | OUTPATIENT
Start: 2018-12-19 | End: 2018-12-19 | Stop reason: HOSPADM

## 2018-12-19 RX ORDER — LIDOCAINE HYDROCHLORIDE 10 MG/ML
INJECTION, SOLUTION INFILTRATION; PERINEURAL PRN
Status: DISCONTINUED | OUTPATIENT
Start: 2018-12-19 | End: 2018-12-19 | Stop reason: SDUPTHER

## 2018-12-19 RX ORDER — METOCLOPRAMIDE HYDROCHLORIDE 5 MG/ML
10 INJECTION INTRAMUSCULAR; INTRAVENOUS
Status: DISCONTINUED | OUTPATIENT
Start: 2018-12-19 | End: 2018-12-19 | Stop reason: HOSPADM

## 2018-12-19 RX ORDER — TAMSULOSIN HYDROCHLORIDE 0.4 MG/1
0.4 CAPSULE ORAL ONCE
Status: COMPLETED | OUTPATIENT
Start: 2018-12-19 | End: 2018-12-19

## 2018-12-19 RX ORDER — SCOLOPAMINE TRANSDERMAL SYSTEM 1 MG/1
1 PATCH, EXTENDED RELEASE TRANSDERMAL ONCE
Status: DISCONTINUED | OUTPATIENT
Start: 2018-12-19 | End: 2018-12-19 | Stop reason: HOSPADM

## 2018-12-19 RX ORDER — PROPOFOL 10 MG/ML
INJECTION, EMULSION INTRAVENOUS PRN
Status: DISCONTINUED | OUTPATIENT
Start: 2018-12-19 | End: 2018-12-19 | Stop reason: SDUPTHER

## 2018-12-19 RX ORDER — HYDROCODONE BITARTRATE AND ACETAMINOPHEN 5; 325 MG/1; MG/1
1 TABLET ORAL EVERY 6 HOURS PRN
Qty: 12 TABLET | Refills: 0 | Status: SHIPPED | OUTPATIENT
Start: 2018-12-19 | End: 2018-12-22

## 2018-12-19 RX ORDER — ONDANSETRON 2 MG/ML
4 INJECTION INTRAMUSCULAR; INTRAVENOUS EVERY 4 HOURS PRN
Status: DISCONTINUED | OUTPATIENT
Start: 2018-12-19 | End: 2018-12-19 | Stop reason: HOSPADM

## 2018-12-19 RX ORDER — MEPERIDINE HYDROCHLORIDE 50 MG/ML
12.5 INJECTION INTRAMUSCULAR; INTRAVENOUS; SUBCUTANEOUS EVERY 5 MIN PRN
Status: DISCONTINUED | OUTPATIENT
Start: 2018-12-19 | End: 2018-12-19 | Stop reason: HOSPADM

## 2018-12-19 RX ORDER — KETAMINE HYDROCHLORIDE 100 MG/ML
INJECTION, SOLUTION INTRAMUSCULAR; INTRAVENOUS PRN
Status: DISCONTINUED | OUTPATIENT
Start: 2018-12-19 | End: 2018-12-19 | Stop reason: SDUPTHER

## 2018-12-19 RX ORDER — SODIUM CHLORIDE 9 MG/ML
INJECTION, SOLUTION INTRAVENOUS CONTINUOUS
Status: DISCONTINUED | OUTPATIENT
Start: 2018-12-19 | End: 2018-12-19 | Stop reason: HOSPADM

## 2018-12-19 RX ORDER — CIPROFLOXACIN 500 MG/1
500 TABLET, FILM COATED ORAL 2 TIMES DAILY
Qty: 6 TABLET | Refills: 0 | Status: SHIPPED | OUTPATIENT
Start: 2018-12-19 | End: 2018-12-22

## 2018-12-19 RX ORDER — SODIUM CHLORIDE 0.9 % (FLUSH) 0.9 %
10 SYRINGE (ML) INJECTION PRN
Status: DISCONTINUED | OUTPATIENT
Start: 2018-12-19 | End: 2018-12-19 | Stop reason: HOSPADM

## 2018-12-19 RX ORDER — MORPHINE SULFATE 2 MG/ML
4 INJECTION, SOLUTION INTRAMUSCULAR; INTRAVENOUS EVERY 5 MIN PRN
Status: DISCONTINUED | OUTPATIENT
Start: 2018-12-19 | End: 2018-12-19 | Stop reason: HOSPADM

## 2018-12-19 RX ORDER — MORPHINE SULFATE 4 MG/ML
4 INJECTION, SOLUTION INTRAMUSCULAR; INTRAVENOUS
Status: DISCONTINUED | OUTPATIENT
Start: 2018-12-19 | End: 2018-12-19 | Stop reason: HOSPADM

## 2018-12-19 RX ORDER — LABETALOL HYDROCHLORIDE 5 MG/ML
5 INJECTION, SOLUTION INTRAVENOUS EVERY 10 MIN PRN
Status: DISCONTINUED | OUTPATIENT
Start: 2018-12-19 | End: 2018-12-19 | Stop reason: HOSPADM

## 2018-12-19 RX ORDER — OXYCODONE HYDROCHLORIDE AND ACETAMINOPHEN 5; 325 MG/1; MG/1
2 TABLET ORAL EVERY 4 HOURS PRN
Status: DISCONTINUED | OUTPATIENT
Start: 2018-12-19 | End: 2018-12-19 | Stop reason: HOSPADM

## 2018-12-19 RX ORDER — ROCURONIUM BROMIDE 10 MG/ML
INJECTION, SOLUTION INTRAVENOUS PRN
Status: DISCONTINUED | OUTPATIENT
Start: 2018-12-19 | End: 2018-12-19 | Stop reason: SDUPTHER

## 2018-12-19 RX ORDER — DIPHENHYDRAMINE HYDROCHLORIDE 50 MG/ML
12.5 INJECTION INTRAMUSCULAR; INTRAVENOUS
Status: DISCONTINUED | OUTPATIENT
Start: 2018-12-19 | End: 2018-12-19 | Stop reason: HOSPADM

## 2018-12-19 RX ORDER — MORPHINE SULFATE 4 MG/ML
2 INJECTION, SOLUTION INTRAMUSCULAR; INTRAVENOUS EVERY 4 HOURS PRN
Status: DISCONTINUED | OUTPATIENT
Start: 2018-12-19 | End: 2018-12-19 | Stop reason: HOSPADM

## 2018-12-19 RX ORDER — FENTANYL CITRATE 50 UG/ML
INJECTION, SOLUTION INTRAMUSCULAR; INTRAVENOUS PRN
Status: DISCONTINUED | OUTPATIENT
Start: 2018-12-19 | End: 2018-12-19 | Stop reason: SDUPTHER

## 2018-12-19 RX ORDER — SODIUM CHLORIDE 0.9 % (FLUSH) 0.9 %
10 SYRINGE (ML) INJECTION EVERY 12 HOURS SCHEDULED
Status: DISCONTINUED | OUTPATIENT
Start: 2018-12-19 | End: 2018-12-19 | Stop reason: HOSPADM

## 2018-12-19 RX ORDER — SODIUM CHLORIDE, SODIUM LACTATE, POTASSIUM CHLORIDE, CALCIUM CHLORIDE 600; 310; 30; 20 MG/100ML; MG/100ML; MG/100ML; MG/100ML
INJECTION, SOLUTION INTRAVENOUS CONTINUOUS
Status: DISCONTINUED | OUTPATIENT
Start: 2018-12-19 | End: 2018-12-19 | Stop reason: HOSPADM

## 2018-12-19 RX ORDER — HYDRALAZINE HYDROCHLORIDE 20 MG/ML
5 INJECTION INTRAMUSCULAR; INTRAVENOUS EVERY 10 MIN PRN
Status: DISCONTINUED | OUTPATIENT
Start: 2018-12-19 | End: 2018-12-19 | Stop reason: HOSPADM

## 2018-12-19 RX ORDER — CIPROFLOXACIN 2 MG/ML
400 INJECTION, SOLUTION INTRAVENOUS ONCE
Status: COMPLETED | OUTPATIENT
Start: 2018-12-19 | End: 2018-12-19

## 2018-12-19 RX ORDER — TAMSULOSIN HYDROCHLORIDE 0.4 MG/1
0.4 CAPSULE ORAL ONCE
Qty: 30 CAPSULE | Refills: 3 | Status: SHIPPED | OUTPATIENT
Start: 2018-12-19 | End: 2019-01-03 | Stop reason: SDUPTHER

## 2018-12-19 RX ORDER — MIDAZOLAM HYDROCHLORIDE 1 MG/ML
2 INJECTION INTRAMUSCULAR; INTRAVENOUS
Status: DISCONTINUED | OUTPATIENT
Start: 2018-12-19 | End: 2018-12-19 | Stop reason: HOSPADM

## 2018-12-19 RX ORDER — DEXAMETHASONE SODIUM PHOSPHATE 10 MG/ML
INJECTION INTRAMUSCULAR; INTRAVENOUS PRN
Status: DISCONTINUED | OUTPATIENT
Start: 2018-12-19 | End: 2018-12-19 | Stop reason: SDUPTHER

## 2018-12-19 RX ORDER — FENTANYL CITRATE 50 UG/ML
50 INJECTION, SOLUTION INTRAMUSCULAR; INTRAVENOUS
Status: DISCONTINUED | OUTPATIENT
Start: 2018-12-19 | End: 2018-12-19 | Stop reason: HOSPADM

## 2018-12-19 RX ADMIN — SODIUM CHLORIDE, SODIUM LACTATE, POTASSIUM CHLORIDE, AND CALCIUM CHLORIDE: 600; 310; 30; 20 INJECTION, SOLUTION INTRAVENOUS at 09:59

## 2018-12-19 RX ADMIN — DEXAMETHASONE SODIUM PHOSPHATE 10 MG: 10 INJECTION INTRAMUSCULAR; INTRAVENOUS at 13:04

## 2018-12-19 RX ADMIN — SUGAMMADEX 200 MG: 100 INJECTION, SOLUTION INTRAVENOUS at 13:34

## 2018-12-19 RX ADMIN — TAMSULOSIN HYDROCHLORIDE 0.4 MG: 0.4 CAPSULE ORAL at 14:31

## 2018-12-19 RX ADMIN — PROPOFOL 130 MG: 10 INJECTION, EMULSION INTRAVENOUS at 12:56

## 2018-12-19 RX ADMIN — Medication 30 MG: at 12:56

## 2018-12-19 RX ADMIN — CIPROFLOXACIN 400 MG: 2 INJECTION, SOLUTION INTRAVENOUS at 13:02

## 2018-12-19 RX ADMIN — FENTANYL CITRATE 50 MCG: 50 INJECTION INTRAMUSCULAR; INTRAVENOUS at 12:56

## 2018-12-19 RX ADMIN — SODIUM CHLORIDE, SODIUM LACTATE, POTASSIUM CHLORIDE, AND CALCIUM CHLORIDE: 600; 310; 30; 20 INJECTION, SOLUTION INTRAVENOUS at 13:33

## 2018-12-19 RX ADMIN — SODIUM CHLORIDE, SODIUM LACTATE, POTASSIUM CHLORIDE, AND CALCIUM CHLORIDE: 600; 310; 30; 20 INJECTION, SOLUTION INTRAVENOUS at 12:53

## 2018-12-19 RX ADMIN — ROCURONIUM BROMIDE 40 MG: 10 INJECTION INTRAVENOUS at 12:56

## 2018-12-19 RX ADMIN — LIDOCAINE HYDROCHLORIDE 50 MG: 10 INJECTION, SOLUTION INFILTRATION; PERINEURAL at 12:56

## 2018-12-19 ASSESSMENT — ENCOUNTER SYMPTOMS: SHORTNESS OF BREATH: 0

## 2018-12-19 ASSESSMENT — PAIN SCALES - GENERAL: PAINLEVEL_OUTOF10: 0

## 2018-12-19 ASSESSMENT — LIFESTYLE VARIABLES: SMOKING_STATUS: 0

## 2018-12-19 NOTE — ANESTHESIA PRE PROCEDURE
opening: > = 3 FB Dental: normal exam         Pulmonary:Negative Pulmonary ROS and normal exam  breath sounds clear to auscultation  (+) pneumonia: resolved,      (-) shortness of breath and not a current smoker          Patient did not smoke on day of surgery. Cardiovascular:    (+) hypertension:, CHF:, hyperlipidemia    (-) CAD and  angina    NYHA Classification: I  ECG reviewed  Rhythm: regular  Rate: normal           Beta Blocker:  Dose within 24 Hrs      ROS comment:     Type of Study      TTE procedure:ECHOCARDIOGRAM COMPLETE 2D WO COLOR DOPPLER.     Study Location: Portable  Technical Quality: Adequate visualization    Patient Status: Inpatient     Conclusions      Summary   Limited echo for ejection fraction.   Normal left ventricular cavity with overall normal systolic function.      Signature      ----------------------------------------------     Neuro/Psych:   Negative Neuro/Psych ROS     (-) seizures, CVA and depression/anxiety            GI/Hepatic/Renal: Neg GI/Hepatic/Renal ROS  (+) GERD:, liver disease:, renal disease: CRI and dialysis,      (-) hiatal hernia      ROS comment: Diverticultis . Endo/Other: Negative Endo/Other ROS   (+) : arthritis: OA., . Pt had PAT visit. Abdominal:       Abdomen: soft. Vascular:   + PVD, aortic or cerebral (leg stent ), . Anesthesia Plan      general     ASA 3     (Iv zofran within 30 min of closing )  Induction: intravenous. BIS  MIPS: Postoperative opioids intended and Prophylactic antiemetics administered. Anesthetic plan and risks discussed with patient. Use of blood products discussed with patient whom. Plan discussed with CRNA.     Attending anesthesiologist reviewed and agrees with Pre Eval content              Humza Adkins MD   12/19/2018

## 2018-12-19 NOTE — ANESTHESIA POSTPROCEDURE EVALUATION
Department of Anesthesiology  Postprocedure Note    Patient: Katia Lake  MRN: [id number]  YOB: 1940  Date of evaluation: 12/19/2018  Time:  1:46 PM     Procedure Summary     Date:  12/19/18 Room / Location:  [facility] / [facility] OR    Anesthesia Start:  1253 Anesthesia Stop:  4488    Procedure:  CYSTOSCOPY, BIOSPY FULGURATION OF BLADDER TUMOR POSSIBLE TURBT, RETROGRADE PYELOGRAM (Bilateral ) Diagnosis:  (BLADDER TUMOR)    Surgeon:  Jordana Meadows MD Responsible Provider:  ELISABETH Alonso CRNA    Anesthesia Type:  general ASA Status:  3          Anesthesia Type: general    Cristy Phase I:      Cristy Phase II:      Last vitals: Reviewed and per EMR flowsheets.        Anesthesia Post Evaluation    Patient location during evaluation: PACU  Patient participation: complete - patient participated  Level of consciousness: awake and alert  Pain score: 0  Airway patency: patent  Nausea & Vomiting: no nausea and no vomiting  Complications: no  Cardiovascular status: hemodynamically stable  Respiratory status: acceptable  Hydration status: euvolemic

## 2018-12-19 NOTE — H&P
H&P  Mic Jimenez MD   Urology     Charmaine Gaspar is a 69 y.o. male who presents today        Chief Complaint   Patient presents with    Cystoscopy       pt here for cysto     New Patient         Hematuria  Patient complains of gross hematuria. Onset of hematuria was 2 week ago and was sudden in onset. There is not a history of nephrolithiasis. There is not a history of urologic trauma however this was related to when he had a Shultz catheter place we can emergency room. He underwent a knee replacement and presented to the emergency room with A. fib and rapid ventricular response he would be on L Oquist phrenic regulation and after catheter placement developed gross hematuria. He is now here for evaluation with cystoscopy. .Other urologic symptoms include nocturia. Patient admits to history of tobacco use. Patient denies history of  surgeries. Prior workup has been UA'S, CT CT scanner and pelvis with contrast shows soft tissue density located in the posterior floor of his and some moderate prostate enlargement otherwise normal-appearing kidneys no stones as was done on 10/23/18.     Urinary Retention:  Patient is here today for Acute Urinary Retention which occured approximately 2 week(s) ago   and was sudden in onset. Patient currently does have a urinary catheter in place. Volume of urine obtained when catheter was placed: volume unknown  Prior to this event voiding symptoms consisted of nocturia, 2 times per night  Prior treatments include: Tamsulosin. Patient says he regularly had annual prostate checks and PSAs which involved a normal he was not taking medications however he was started on tamsulosin in the hospital he comes in today with Shultz catheter in place for a fill and pull. No prior episodes of urinary retention and he denies any obstructive voiding symptoms.  He did however have renal failure and is now on dialysis he says he still makes urine and has to empty the Shultz catheter bag about once a day        Past Medical History        Past Medical History:   Diagnosis Date    Acute liver failure without hepatic coma 10/23/2018    Back pain       \"with tired legs as a result\"    Blood circulation, collateral      Carotid arterial disease (HCC)       recent surgery    CHF (congestive heart failure) ([address] 10/23/2018    CKD (chronic kidney disease), 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/2018    GERD (gastroesophageal reflux disease)      Hyperlipidemia      Hypertension      Hypertension      Palliative care patient 10/23/2018    Pneumonia due to infectious organism 11/6/2018    Primary osteoarthritis of left knee 10/14/2018    PVD (peripheral vascular disease) [facility])              Past Surgical History         Past Surgical History:   Procedure Laterality Date    BACK SURGERY        COLONOSCOPY   2007?  AL COLONOSCOPY FLX DX W/COLLJ SPEC WHEN PFRMD N/A 9/11/2017     Dr Annamaria Marin internal hemorrhoids, diverticular disease-HP-No recall (age)   Humberto Gather AL REVISE MEDIAN N/CARPAL TUNNEL SURG Left 7/18/2018     OPEN CARPAL TUNNEL RELEASE performed by Rocky Nyhan, MD at [address] Left 8/28/2018     LEFT CAROTID ENDARTERECTOMY WITH VEIN PATCH ANGIOPLASTY AND COMPLETION ANGIOGRAM performed by Raven Jacob MD at [place] [address] Left 10/15/2018     LEFT COMPLEX TOTAL KNEE ARTHROPLASTY performed by Rocky Nyhan, MD at [address] [facility] VASCULAR SURGERY   04/21/2015     Severa James M. D. Ultrasound guided access of left common femoral artery. Aortogram.Diagnostic right lower extremity arteriogram.Radiologic supervision and interpretation.  VASCULAR SURGERY   01/13/2015     Severa James M.D Atherectomy,angioplasty,and stenting of left superficial femoral artery.  VASCULAR SURGERY   03/11/2014   Adolph Ramos M.D. Ultrasound-guided access of right common femoral artery. Aortogram.Left lower extremity

## 2018-12-20 NOTE — OP NOTE
[id number]
be placed for a short interval to  continuous irrigation. He was taken to the recovery room and was  awakened from his anesthetic in stable condition.         Evgeny Mascorro MD    D: 12/19/2018 15:03:15      T: 12/19/2018 20:45:48     [email or web address]  Job#: [id number]     Doc#: [id number]    CC:

## 2019-01-03 ENCOUNTER — OFFICE VISIT (OUTPATIENT)
Dept: UROLOGY | Age: 79
End: 2019-01-03
Payer: MEDICARE

## 2019-01-03 VITALS — BODY MASS INDEX: 29.26 KG/M2 | WEIGHT: 216 LBS | TEMPERATURE: 97.1 F | HEIGHT: 72 IN

## 2019-01-03 DIAGNOSIS — R33.8 BENIGN PROSTATIC HYPERPLASIA WITH URINARY RETENTION: Primary | ICD-10-CM

## 2019-01-03 DIAGNOSIS — N40.1 BENIGN PROSTATIC HYPERPLASIA WITH URINARY RETENTION: Primary | ICD-10-CM

## 2019-01-03 DIAGNOSIS — Z85.51 HISTORY OF BLADDER CANCER: ICD-10-CM

## 2019-01-03 LAB
APPEARANCE FLUID: CLEAR
BILIRUBIN, POC: NORMAL
BLOOD URINE, POC: NORMAL
CLARITY, POC: CLEAR
COLOR, POC: YELLOW
GLUCOSE URINE, POC: NORMAL
KETONES, POC: NORMAL
LEUKOCYTE EST, POC: NORMAL
NITRITE, POC: NORMAL
PH, POC: 5.5
PROTEIN, POC: 30
SPECIFIC GRAVITY, POC: 1.03
UROBILINOGEN, POC: 0.2

## 2019-01-03 PROCEDURE — 99214 OFFICE O/P EST MOD 30 MIN: CPT | Performed by: UROLOGY

## 2019-01-03 PROCEDURE — G8598 ASA/ANTIPLAT THER USED: HCPCS | Performed by: UROLOGY

## 2019-01-03 PROCEDURE — G8417 CALC BMI ABV UP PARAM F/U: HCPCS | Performed by: UROLOGY

## 2019-01-03 PROCEDURE — 1123F ACP DISCUSS/DSCN MKR DOCD: CPT | Performed by: UROLOGY

## 2019-01-03 PROCEDURE — 1101F PT FALLS ASSESS-DOCD LE1/YR: CPT | Performed by: UROLOGY

## 2019-01-03 PROCEDURE — 81002 URINALYSIS NONAUTO W/O SCOPE: CPT | Performed by: UROLOGY

## 2019-01-03 PROCEDURE — 4040F PNEUMOC VAC/ADMIN/RCVD: CPT | Performed by: UROLOGY

## 2019-01-03 PROCEDURE — G8484 FLU IMMUNIZE NO ADMIN: HCPCS | Performed by: UROLOGY

## 2019-01-03 PROCEDURE — G8427 DOCREV CUR MEDS BY ELIG CLIN: HCPCS | Performed by: UROLOGY

## 2019-01-03 PROCEDURE — 1036F TOBACCO NON-USER: CPT | Performed by: UROLOGY

## 2019-01-03 RX ORDER — TAMSULOSIN HYDROCHLORIDE 0.4 MG/1
0.4 CAPSULE ORAL ONCE
Qty: 90 CAPSULE | Refills: 3 | Status: SHIPPED | OUTPATIENT
Start: 2019-01-03 | End: 2020-06-15

## 2019-01-03 ASSESSMENT — ENCOUNTER SYMPTOMS
BACK PAIN: 0
ABDOMINAL PAIN: 0
EYE PAIN: 0
SINUS PAIN: 0
WHEEZING: 0
VOMITING: 0
SHORTNESS OF BREATH: 0

## 2019-02-13 ENCOUNTER — OFFICE VISIT (OUTPATIENT)
Dept: OTOLARYNGOLOGY | Age: 79
End: 2019-02-13
Payer: MEDICARE

## 2019-02-13 ENCOUNTER — PROCEDURE VISIT (OUTPATIENT)
Dept: OTOLARYNGOLOGY | Age: 79
End: 2019-02-13
Payer: MEDICARE

## 2019-02-13 VITALS
TEMPERATURE: 97.8 F | BODY MASS INDEX: 29.26 KG/M2 | SYSTOLIC BLOOD PRESSURE: 122 MMHG | OXYGEN SATURATION: 96 % | HEIGHT: 72 IN | RESPIRATION RATE: 18 BRPM | WEIGHT: 216 LBS | DIASTOLIC BLOOD PRESSURE: 60 MMHG

## 2019-02-13 DIAGNOSIS — R09.81 NASAL CONGESTION: ICD-10-CM

## 2019-02-13 DIAGNOSIS — R49.0 HOARSENESS: ICD-10-CM

## 2019-02-13 DIAGNOSIS — T48.5X5A RHINITIS MEDICAMENTOSA: Primary | ICD-10-CM

## 2019-02-13 DIAGNOSIS — H90.3 SENSORINEURAL HEARING LOSS (SNHL) OF BOTH EARS: Primary | ICD-10-CM

## 2019-02-13 DIAGNOSIS — J31.0 RHINITIS MEDICAMENTOSA: Primary | ICD-10-CM

## 2019-02-13 PROCEDURE — 92557 COMPREHENSIVE HEARING TEST: CPT | Performed by: AUDIOLOGIST

## 2019-02-13 PROCEDURE — 92550 TYMPANOMETRY & REFLEX THRESH: CPT | Performed by: AUDIOLOGIST

## 2019-02-13 PROCEDURE — 1123F ACP DISCUSS/DSCN MKR DOCD: CPT | Performed by: OTOLARYNGOLOGY

## 2019-02-13 PROCEDURE — 4040F PNEUMOC VAC/ADMIN/RCVD: CPT | Performed by: OTOLARYNGOLOGY

## 2019-02-13 PROCEDURE — G8427 DOCREV CUR MEDS BY ELIG CLIN: HCPCS | Performed by: OTOLARYNGOLOGY

## 2019-02-13 PROCEDURE — 1036F TOBACCO NON-USER: CPT | Performed by: OTOLARYNGOLOGY

## 2019-02-13 PROCEDURE — G8417 CALC BMI ABV UP PARAM F/U: HCPCS | Performed by: OTOLARYNGOLOGY

## 2019-02-13 PROCEDURE — G8598 ASA/ANTIPLAT THER USED: HCPCS | Performed by: OTOLARYNGOLOGY

## 2019-02-13 PROCEDURE — G8484 FLU IMMUNIZE NO ADMIN: HCPCS | Performed by: OTOLARYNGOLOGY

## 2019-02-13 PROCEDURE — 99204 OFFICE O/P NEW MOD 45 MIN: CPT | Performed by: OTOLARYNGOLOGY

## 2019-02-13 PROCEDURE — 31575 DIAGNOSTIC LARYNGOSCOPY: CPT | Performed by: OTOLARYNGOLOGY

## 2019-02-13 PROCEDURE — 1101F PT FALLS ASSESS-DOCD LE1/YR: CPT | Performed by: OTOLARYNGOLOGY

## 2019-02-13 RX ORDER — FLUTICASONE PROPIONATE 50 MCG
2 SPRAY, SUSPENSION (ML) NASAL DAILY
Qty: 1 BOTTLE | Refills: 11 | Status: SHIPPED | OUTPATIENT
Start: 2019-02-13 | End: 2019-04-29

## 2019-02-13 RX ORDER — PREDNISONE 10 MG/1
10 TABLET ORAL SEE ADMIN INSTRUCTIONS
Qty: 32 TABLET | Refills: 0 | Status: SHIPPED | OUTPATIENT
Start: 2019-02-13 | End: 2019-02-25

## 2019-04-02 NOTE — PROGRESS NOTES
Atherectomy,angioplasty,and stenting of left superficial femoral artery.  VASCULAR SURGERY  03/11/2014    Vane Ragsdale M.D. Ultrasound-guided access of right common femoral artery. Aortogram.Left lower extremity arteriogram.Atherectomy and angioplasty of left superficial femoral artery. Radiologic supervision and interpretation.  VASCULAR SURGERY  01/18/2013    aVne BESS Aortogram.Multistation arteriogram right lower extremity. Laser atherectomy and angioplasty of right superficial femoral artery. Selective catheterization of right tibioperoneal trunk. Angioplasty of peroneal artery and tibioperoneal trunk.  VASCULAR SURGERY  10/30/2018    SJS. Ultrasound guided cannulation of right internal vein. Placement of right internal jugular vein tunneled dialysis catheter bard PROTEGOeam xk 23cm tip to cuff       Family History  Family History   Problem Relation Age of Onset    Colon Cancer Father     Diabetes Brother     Colon Polyps Neg Hx     Liver Cancer Neg Hx     Liver Disease Neg Hx     Esophageal Cancer Neg Hx     Rectal Cancer Neg Hx     Stomach Cancer Neg Hx        Social History  Social History     Social History    Marital status:      Spouse name: N/A    Number of children: N/A    Years of education: N/A     Occupational History    Not on file.      Social History Main Topics    Smoking status: Former Smoker     Quit date: 6/3/2003    Smokeless tobacco: Never Used    Alcohol use 7.2 oz/week     12 Glasses of wine per week      Comment: 2 glasses of wine every night    Drug use: No    Sexual activity: Yes     Partners: Female     Other Topics Concern    Not on file     Social History Narrative    No narrative on file         Review of Systems:  History obtained from chart review and the patient  General ROS: No fever or chills  Respiratory ROS: No cough,+ shortness of breath, -wheezing  Cardiovascular ROS: no chest pain but dyspnea on exertion  Gastrointestinal ROS: No n/a

## 2019-04-05 DIAGNOSIS — Z85.51 HISTORY OF BLADDER CANCER: ICD-10-CM

## 2019-04-12 ENCOUNTER — TRANSCRIBE ORDERS (OUTPATIENT)
Dept: ADMINISTRATIVE | Facility: HOSPITAL | Age: 79
End: 2019-04-12

## 2019-04-12 DIAGNOSIS — M54.16 LUMBAR RADICULOPATHY: Primary | ICD-10-CM

## 2019-04-15 ENCOUNTER — OFFICE VISIT (OUTPATIENT)
Dept: OTOLARYNGOLOGY | Age: 79
End: 2019-04-15
Payer: MEDICARE

## 2019-04-15 VITALS
HEART RATE: 77 BPM | BODY MASS INDEX: 29.29 KG/M2 | SYSTOLIC BLOOD PRESSURE: 136 MMHG | RESPIRATION RATE: 18 BRPM | TEMPERATURE: 97.6 F | OXYGEN SATURATION: 98 % | DIASTOLIC BLOOD PRESSURE: 72 MMHG | HEIGHT: 72 IN

## 2019-04-15 DIAGNOSIS — R09.81 NASAL CONGESTION: ICD-10-CM

## 2019-04-15 DIAGNOSIS — T48.5X5A RHINITIS MEDICAMENTOSA: Primary | ICD-10-CM

## 2019-04-15 DIAGNOSIS — J31.0 RHINITIS MEDICAMENTOSA: Primary | ICD-10-CM

## 2019-04-15 PROCEDURE — 1123F ACP DISCUSS/DSCN MKR DOCD: CPT | Performed by: OTOLARYNGOLOGY

## 2019-04-15 PROCEDURE — G8598 ASA/ANTIPLAT THER USED: HCPCS | Performed by: OTOLARYNGOLOGY

## 2019-04-15 PROCEDURE — 1036F TOBACCO NON-USER: CPT | Performed by: OTOLARYNGOLOGY

## 2019-04-15 PROCEDURE — G8417 CALC BMI ABV UP PARAM F/U: HCPCS | Performed by: OTOLARYNGOLOGY

## 2019-04-15 PROCEDURE — G8427 DOCREV CUR MEDS BY ELIG CLIN: HCPCS | Performed by: OTOLARYNGOLOGY

## 2019-04-15 PROCEDURE — 4040F PNEUMOC VAC/ADMIN/RCVD: CPT | Performed by: OTOLARYNGOLOGY

## 2019-04-15 PROCEDURE — 99213 OFFICE O/P EST LOW 20 MIN: CPT | Performed by: OTOLARYNGOLOGY

## 2019-04-15 RX ORDER — PREDNISONE 10 MG/1
10 TABLET ORAL SEE ADMIN INSTRUCTIONS
Qty: 32 TABLET | Refills: 0 | Status: SHIPPED | OUTPATIENT
Start: 2019-04-15 | End: 2019-04-27

## 2019-04-15 NOTE — PROGRESS NOTES
[facility]  [address] [facility]  [address] [place]   Dept: [phone]  Dept Fax: [phone]  Loc: [phone]     Marvin Mohan is a 69 y.o. male who presents today for his medical conditions/complaintsas noted below. Marvin Mohan is c/o of Nasal Congestion (Patient reports his nose still stops up at night )      Current Outpatient Medications   Medication Sig Dispense Refill    fluticasone (FLONASE) 50 MCG/ACT nasal spray 2 sprays by Nasal route daily 1 Bottle 11    atorvastatin (LIPITOR) 20 MG tablet Take 20 mg by mouth daily      losartan-hydrochlorothiazide (HYZAAR) 100-25 MG per tablet Take 1 tablet by mouth daily      pantoprazole (PROTONIX) 40 MG tablet Take 40 mg by mouth daily      aspirin 81 MG tablet Take 81 mg by mouth daily      metoprolol succinate (TOPROL XL) 100 MG extended release tablet Take 100 mg by mouth daily      NIFEdipine (PROCARDIA XL) 60 MG extended release tablet Take 60 mg by mouth daily      tamsulosin (FLOMAX) 0.4 MG capsule Take 1 capsule by mouth once for 1 dose 90 capsule 3    famotidine (PEPCID) 20 MG/2ML injection Infuse 2 mLs intravenously once for 1 dose 2 mL 0     No current facility-administered medications for this visit. Allergies   Allergen Reactions    Eliquis [Apixaban] Other (See Comments)     \"almost bled to death\"    Promethazine Hcl Other (See Comments)     He became encephalopathic for several hours and was not responsive. Subjective:    Patient is using Afrin nasal spray again. Back on Afrin and failed attempt to wean    A 12 point review of systems was completed, reviewed, and scanned to chart per staff. Patient medical history reviewed.     Objective:     Physical Exam  /72   Pulse 77   Temp 97.6 °F (36.4 °C)   Resp 18   Ht 6' (1.829 m)   SpO2 98%   BMI 29.29 kg/m²   Physical Exam:     General appearance: Normally developed structures of the head and neck with no deformities. Ability to communicate: Normal voice with ability to convey appropriately the nature of their complaints. Head and Face: Normal appearance with no visible lesions of the skin. Sinus tenderness: None appreciated on exam. No areas of facial swelling. Facial strength: No weakness of the facial muscles appreciated. Otoscopic: Normal external ear canals and tympanic membranes. Hearing assessment: normal to soft voice and finger rub. External ears and nose: normal.   Nasal exam: Anterior speculum exam normal with no polyps purulence or lesions. Oral:  Mucosa of buccal, floor of mouth, and palatal areas appear normal and free of any lesions. Lips, teeth, gingiva: Normal with no lesions. Oropharynx: Tongue reveals normal appearance and mobility. Oral mucosa of buccal, floor of mouth, and palatal areas appear normal and free of any lesions or ulcerations. Salivary:  Examination of the parotid and submandibular glands was normal with no palpable masses, nodules or irregularities. Neck: No gross swellings or deformities. No palpable lymphadenopathy. Thyroid normal without palpable masses. Respiratory:  Effort appears normal with no notable distress, stridor,accessory muscle usage or tachypnea       Assessment:      Diagnosis Orders   1. Rhinitis medicamentosa     2. Nasal congestion             Plan:     Retry weaning if not successful consider turbinate procedure. RTO 2 weeks. Vikki Porter am scribing for and in the presence of Dr. Dulce Maharaj April 15, 2019/4:02 PM/Yanique Maharaj. Byron Cadet MD,  I personally performed the services described in this documentation as scribed by Manuela Valdivia in my presence and it appears accurate and complete.

## 2019-04-19 ENCOUNTER — APPOINTMENT (OUTPATIENT)
Dept: MRI IMAGING | Facility: HOSPITAL | Age: 79
End: 2019-04-19

## 2019-04-22 ENCOUNTER — HOSPITAL ENCOUNTER (OUTPATIENT)
Dept: MRI IMAGING | Facility: HOSPITAL | Age: 79
End: 2019-04-22

## 2019-04-23 ENCOUNTER — TRANSCRIBE ORDERS (OUTPATIENT)
Dept: ADMINISTRATIVE | Facility: HOSPITAL | Age: 79
End: 2019-04-23

## 2019-04-23 DIAGNOSIS — M54.16 RADICULOPATHY, LUMBAR REGION: Primary | ICD-10-CM

## 2019-04-26 ENCOUNTER — APPOINTMENT (OUTPATIENT)
Dept: CT IMAGING | Facility: HOSPITAL | Age: 79
End: 2019-04-26

## 2019-04-26 ENCOUNTER — APPOINTMENT (OUTPATIENT)
Dept: GENERAL RADIOLOGY | Facility: HOSPITAL | Age: 79
End: 2019-04-26

## 2019-04-29 ENCOUNTER — PROCEDURE VISIT (OUTPATIENT)
Dept: UROLOGY | Age: 79
End: 2019-04-29
Payer: MEDICARE

## 2019-04-29 VITALS — HEIGHT: 67 IN | BODY MASS INDEX: 34.84 KG/M2 | TEMPERATURE: 97 F | WEIGHT: 222 LBS

## 2019-04-29 DIAGNOSIS — Z85.51 HISTORY OF BLADDER CANCER: Primary | ICD-10-CM

## 2019-04-29 LAB
APPEARANCE FLUID: NORMAL
BILIRUBIN, POC: NORMAL
BLOOD URINE, POC: NORMAL
CLARITY, POC: CLEAR
COLOR, POC: YELLOW
GLUCOSE URINE, POC: NORMAL
KETONES, POC: NORMAL
LEUKOCYTE EST, POC: NORMAL
NITRITE, POC: NORMAL
PH, POC: 5.5
PROTEIN, POC: NORMAL
SPECIFIC GRAVITY, POC: 1.02
UROBILINOGEN, POC: 1

## 2019-04-29 PROCEDURE — 81002 URINALYSIS NONAUTO W/O SCOPE: CPT | Performed by: UROLOGY

## 2019-04-29 PROCEDURE — 52000 CYSTOURETHROSCOPY: CPT | Performed by: UROLOGY

## 2019-04-29 RX ORDER — TAMSULOSIN HYDROCHLORIDE 0.4 MG/1
CAPSULE ORAL
Refills: 3 | COMMUNITY
Start: 2019-04-01 | End: 2020-02-27 | Stop reason: SDUPTHER

## 2019-04-29 NOTE — LETTER
[facility]  [address] [place] [address] Drive, [address] [place] [address]  Phone: [phone]  Fax: [phone]    Erma Kelley MD        April 29, 2019     Jalen Arrington MD  [address] [place] [address] [address] [id number]      Patient: Aston Hurst   MR Number: [id number]   YOB: 1940   Date of Visit: 4/29/2019       Dear Provider: Thank you for referring Alessandra Ramirez to me for evaluation. Below are the relevant portions of my assessment and plan of care. If you have questions, please do not hesitate to call me. I look forward to following Serenity Millard along with you.     Sincerely,        Erma Kelley MD

## 2019-04-29 NOTE — PROGRESS NOTES
BLADDER CANCER  Patient was first diagnosed with bladder cancer 4 month(s) ago. 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 Negative  Last upper tract study: 4 month(s) ago. Study was a  bilateral retrograde pyelograms done on 12/19/18    She also has BPH is on tamsulosin he complains mostly of urgency    Cystoscopy Operative Note (4/29/19)  Surgeon: Ephraim Minaya MD  Anesthesia: Urethral 2% Xylocaine   Indications: History of bladder cancer  Position: Supine    Findings:   The patient was prepped and draped in the usual sterile fashion. The flexible cystoscope was advanced through the urethra and into the bladder under direct vision. The bladder was thoroughly inspected and the following was noted:    Residual Urine: mild  Urethra: normal appearing urethra with no masses, tenderness or lesions  Prostate: partially obstructing lateral lobes of prostate; median lobe present? no. Size of median lobe: Small  Bladder: No tumors or CIS noted. No bladder diverticulum. There was mild trabeculation noted. No recurrent papillary tumor seen  Ureters: Clear efflux from both ureters. Orifices with normal configuration and location. The cystoscope was removed. The patient tolerated the procedure well. The patient was given a post procedure instructions and follow up.     Results for orders placed or performed in visit on 04/29/19   POCT Urinalysis no Micro   Result Value Ref Range    Color, UA yellow     Clarity, UA clear     Glucose, UA  mg/dL     Bilirubin, UA neg     Ketones, UA neg     Spec Grav, UA 1.025     Blood, UA POC neg     pH, UA 5.5     Protein, UA  mg/dL     Urobilinogen, UA 1.0     Leukocytes, UA neg     Nitrite, UA neg     Appearance, Fluid Slightly Cloudy Clear, Slightly Cloudy           1. History of bladder cancer    - Cystoscopy  - POCT Urinalysis no Micro  - Cystoscopy; Future  - Cytology, Non-Gyn; Future      Orders Placed This Encounter   Procedures    Cytology, Non-Gyn     Prior to next visit in 3 mos     Standing Status:   Future     Standing Expiration Date:   4/29/2020     Order Specific Question:   PREVIOUS BIOPSY     Answer:   Yes     Order Specific Question:   PREOP DIAGNOSIS     Answer:   History of bladder cancer     Order Specific Question:   FROZEN SECTION - NO OR YES/SPECIMEN     Answer:   No    POCT Urinalysis no Micro    Cystoscopy     Cystoscopy in 3 months     Standing Status:   Future     Standing Expiration Date:   4/28/2020       Return in about 3 months (around 7/29/2019) for Cystoscopy on next visit, Urine Cytology prior to next visit.         Lindy Stanley MD

## 2019-05-01 ENCOUNTER — OFFICE VISIT (OUTPATIENT)
Dept: OTOLARYNGOLOGY | Age: 79
End: 2019-05-01
Payer: MEDICARE

## 2019-05-01 VITALS
BODY MASS INDEX: 34.77 KG/M2 | HEART RATE: 88 BPM | TEMPERATURE: 98.3 F | DIASTOLIC BLOOD PRESSURE: 80 MMHG | SYSTOLIC BLOOD PRESSURE: 138 MMHG | WEIGHT: 222 LBS

## 2019-05-01 DIAGNOSIS — T48.5X5A RHINITIS MEDICAMENTOSA: Primary | ICD-10-CM

## 2019-05-01 DIAGNOSIS — J31.0 RHINITIS MEDICAMENTOSA: Primary | ICD-10-CM

## 2019-05-01 PROCEDURE — 1036F TOBACCO NON-USER: CPT | Performed by: OTOLARYNGOLOGY

## 2019-05-01 PROCEDURE — G8417 CALC BMI ABV UP PARAM F/U: HCPCS | Performed by: OTOLARYNGOLOGY

## 2019-05-01 PROCEDURE — 1123F ACP DISCUSS/DSCN MKR DOCD: CPT | Performed by: OTOLARYNGOLOGY

## 2019-05-01 PROCEDURE — 99213 OFFICE O/P EST LOW 20 MIN: CPT | Performed by: OTOLARYNGOLOGY

## 2019-05-01 PROCEDURE — 4040F PNEUMOC VAC/ADMIN/RCVD: CPT | Performed by: OTOLARYNGOLOGY

## 2019-05-01 PROCEDURE — G8427 DOCREV CUR MEDS BY ELIG CLIN: HCPCS | Performed by: OTOLARYNGOLOGY

## 2019-05-01 PROCEDURE — G8598 ASA/ANTIPLAT THER USED: HCPCS | Performed by: OTOLARYNGOLOGY

## 2019-05-01 NOTE — PROGRESS NOTES
[facility]  [address] [facility]  [address] [place]   Dept: [phone]  Dept Fax: [phone]  Loc: [phone]     Matt Thomas is a 69 y.o. male who presents today for his medical conditions/complaintsas noted below. Matt Thomas is c/o of Follow-up (2 week f/u)      Current Outpatient Medications   Medication Sig Dispense Refill    tamsulosin (FLOMAX) 0.4 MG capsule TK 1 C PO QD  3    atorvastatin (LIPITOR) 20 MG tablet Take 20 mg by mouth daily      losartan-hydrochlorothiazide (HYZAAR) 100-25 MG per tablet Take 1 tablet by mouth daily      pantoprazole (PROTONIX) 40 MG tablet Take 40 mg by mouth daily      metoprolol succinate (TOPROL XL) 100 MG extended release tablet Take 100 mg by mouth daily      NIFEdipine (PROCARDIA XL) 60 MG extended release tablet Take 60 mg by mouth daily      tamsulosin (FLOMAX) 0.4 MG capsule Take 1 capsule by mouth once for 1 dose 90 capsule 3    famotidine (PEPCID) 20 MG/2ML injection Infuse 2 mLs intravenously once for 1 dose 2 mL 0     No current facility-administered medications for this visit. Allergies   Allergen Reactions    Eliquis [Apixaban] Other (See Comments)     \"almost bled to death\"    Promethazine Hcl Other (See Comments)     He became encephalopathic for several hours and was not responsive. Subjective: Follow up Rhinitis medicamentosa. Last steroid pill taken today. Much better. Nasal airway    Patient medical history reviewed. Objective:     Physical Exam  /80   Pulse 88   Temp 98.3 °F (36.8 °C)   Wt 222 lb (100.7 kg)   BMI 34.77 kg/m²   Nasal patent  Assessment:       ICD-10-CM    1. Rhinitis medicamentosa J31.0     T48.5X1A            Plan:   RTO if relapse to discuss alternatives. Continue nasal steroid. John Koo, opal scribing for and in the presence of Dr. Christie Jordan May 1, 2019/1:47 PM/Yanique Jordan. Marques Bazzi MD,  I personally performed the services described in this documentation as scribed by Carol Eduardo in my presence and it appears accurate and complete.

## 2019-06-10 ENCOUNTER — HOSPITAL ENCOUNTER (OUTPATIENT)
Dept: GENERAL RADIOLOGY | Age: 79
Discharge: HOME | End: 2019-06-10
Payer: MEDICARE

## 2019-06-10 ENCOUNTER — HOSPITAL ENCOUNTER (OUTPATIENT)
Dept: CT IMAGING | Age: 79
Discharge: HOME | End: 2019-06-10
Payer: MEDICARE

## 2019-06-10 VITALS
SYSTOLIC BLOOD PRESSURE: 143 MMHG | DIASTOLIC BLOOD PRESSURE: 73 MMHG | RESPIRATION RATE: 20 BRPM | TEMPERATURE: 98 F | HEART RATE: 65 BPM | OXYGEN SATURATION: 99 % | BODY MASS INDEX: 30.11 KG/M2 | HEIGHT: 72 IN

## 2019-06-10 DIAGNOSIS — M54.16 LUMBAR RADICULOPATHY: ICD-10-CM

## 2019-06-10 LAB
INR BLD: 1.02 (ref 0.88–1.18)
PLATELET # BLD: 158 K/UL (ref 130–400)
PROTHROMBIN TIME: 12.8 SEC (ref 12–14.6)

## 2019-06-10 PROCEDURE — 85610 PROTHROMBIN TIME: CPT

## 2019-06-10 PROCEDURE — 72265 MYELOGRAPHY L-S SPINE: CPT

## 2019-06-10 PROCEDURE — 72132 CT LUMBAR SPINE W/DYE: CPT

## 2019-06-10 PROCEDURE — 6360000004 HC RX CONTRAST MEDICATION: Performed by: PAIN MEDICINE

## 2019-06-10 PROCEDURE — 85049 AUTOMATED PLATELET COUNT: CPT

## 2019-06-10 RX ADMIN — IOPAMIDOL 10 ML: 408 INJECTION, SOLUTION INTRATHECAL at 09:40

## 2019-06-10 ASSESSMENT — PAIN - FUNCTIONAL ASSESSMENT: PAIN_FUNCTIONAL_ASSESSMENT: 0-10

## 2019-06-10 NOTE — PROGRESS NOTES
Patient here today for Myelogram.  Patient verified procedure and medications reviewed. Activity level explained to the patient and his wife for the next 24 hours and both verbalized understanding. Patient states that he went to the kidney doctor a couple weeks ago and that his creatine was a little high and that Dr Niko Shankar has talked with him and has cleared him to have the myelogram completed. Patient labs have been drawn and sent awaiting results. Radiology notified of patient status.   BRENT LEE

## 2019-06-10 NOTE — PROGRESS NOTES
Order for myelogram showed CT MYELOGRAM 2 OR MORE REGIONS. Called orthapedic clinic talked with Evangelina the medical assistant and verified order she stated it is ct lumbar only, that their computer always puts 2 or more regions, but the order is only for lumbar myelogram.  Notified April in radiology.

## 2019-07-10 ENCOUNTER — TELEPHONE (OUTPATIENT)
Dept: UROLOGY | Age: 79
End: 2019-07-10

## 2019-07-10 NOTE — TELEPHONE ENCOUNTER
Called patient and informed him he can do cytology any time as long as it is two weeks prior to appt. He voiced understanding.  He will call back if he can not find his order as he is going to [facility]

## 2019-07-31 DIAGNOSIS — I65.23 BILATERAL CAROTID ARTERY STENOSIS: Primary | ICD-10-CM

## 2019-07-31 DIAGNOSIS — I73.9 PVD (PERIPHERAL VASCULAR DISEASE) (HCC): ICD-10-CM

## 2019-08-31 ENCOUNTER — HOSPITAL ENCOUNTER (EMERGENCY)
Age: 79
Discharge: HOME | End: 2019-08-31
Attending: EMERGENCY MEDICINE
Payer: MEDICARE

## 2019-08-31 VITALS
OXYGEN SATURATION: 94 % | TEMPERATURE: 97.9 F | HEART RATE: 81 BPM | RESPIRATION RATE: 16 BRPM | DIASTOLIC BLOOD PRESSURE: 80 MMHG | WEIGHT: 220 LBS | SYSTOLIC BLOOD PRESSURE: 191 MMHG | HEIGHT: 72 IN | BODY MASS INDEX: 29.8 KG/M2

## 2019-08-31 DIAGNOSIS — T14.8XXA BLEEDING FROM WOUND: ICD-10-CM

## 2019-08-31 DIAGNOSIS — T14.8XXA SKIN AVULSION: Primary | ICD-10-CM

## 2019-08-31 PROCEDURE — 6370000000 HC RX 637 (ALT 250 FOR IP): Performed by: EMERGENCY MEDICINE

## 2019-08-31 PROCEDURE — 99282 EMERGENCY DEPT VISIT SF MDM: CPT

## 2019-08-31 RX ADMIN — Medication 1 EACH: at 12:27

## 2019-09-01 ENCOUNTER — HOSPITAL ENCOUNTER (EMERGENCY)
Age: 79
Discharge: HOME | End: 2019-09-01
Payer: MEDICARE

## 2019-09-01 VITALS
RESPIRATION RATE: 16 BRPM | WEIGHT: 220 LBS | OXYGEN SATURATION: 93 % | TEMPERATURE: 98.2 F | BODY MASS INDEX: 29.8 KG/M2 | DIASTOLIC BLOOD PRESSURE: 97 MMHG | HEIGHT: 72 IN | SYSTOLIC BLOOD PRESSURE: 208 MMHG

## 2019-09-01 DIAGNOSIS — T14.8XXA BLEEDING FROM WOUND: Primary | ICD-10-CM

## 2019-09-01 DIAGNOSIS — T14.8XXA SKIN AVULSION: ICD-10-CM

## 2019-09-01 LAB
APTT: 28.8 SEC (ref 26–36.2)
BASOPHILS ABSOLUTE: 0.1 K/UL (ref 0–0.2)
BASOPHILS RELATIVE PERCENT: 0.8 % (ref 0–1)
EOSINOPHILS ABSOLUTE: 0 K/UL (ref 0–0.6)
EOSINOPHILS RELATIVE PERCENT: 0 % (ref 0–5)
HCT VFR BLD CALC: 43.1 % (ref 42–52)
HEMOGLOBIN: 14.2 G/DL (ref 14–18)
IMMATURE GRANULOCYTES #: 0 K/UL
INR BLD: 1.08 (ref 0.88–1.18)
LYMPHOCYTES ABSOLUTE: 1.6 K/UL (ref 1.1–4.5)
LYMPHOCYTES RELATIVE PERCENT: 21.6 % (ref 20–40)
MCH RBC QN AUTO: 29.1 PG (ref 27–31)
MCHC RBC AUTO-ENTMCNC: 32.9 G/DL (ref 33–37)
MCV RBC AUTO: 88.3 FL (ref 80–94)
MONOCYTES ABSOLUTE: 0.6 K/UL (ref 0–0.9)
MONOCYTES RELATIVE PERCENT: 8.8 % (ref 0–10)
NEUTROPHILS ABSOLUTE: 5 K/UL (ref 1.5–7.5)
NEUTROPHILS RELATIVE PERCENT: 68.3 % (ref 50–65)
PDW BLD-RTO: 12.2 % (ref 11.5–14.5)
PLATELET # BLD: 148 K/UL (ref 130–400)
PMV BLD AUTO: 10.7 FL (ref 9.4–12.4)
PROTHROMBIN TIME: 13.4 SEC (ref 12–14.6)
RBC # BLD: 4.88 M/UL (ref 4.7–6.1)
WBC # BLD: 7.3 K/UL (ref 4.8–10.8)

## 2019-09-01 PROCEDURE — 85730 THROMBOPLASTIN TIME PARTIAL: CPT

## 2019-09-01 PROCEDURE — 85025 COMPLETE CBC W/AUTO DIFF WBC: CPT

## 2019-09-01 PROCEDURE — 36415 COLL VENOUS BLD VENIPUNCTURE: CPT

## 2019-09-01 PROCEDURE — 99283 EMERGENCY DEPT VISIT LOW MDM: CPT

## 2019-09-01 PROCEDURE — 2500000003 HC RX 250 WO HCPCS

## 2019-09-01 PROCEDURE — 6370000000 HC RX 637 (ALT 250 FOR IP)

## 2019-09-01 PROCEDURE — 85610 PROTHROMBIN TIME: CPT

## 2019-09-01 RX ORDER — LIDOCAINE HYDROCHLORIDE AND EPINEPHRINE 10; 10 MG/ML; UG/ML
INJECTION, SOLUTION INFILTRATION; PERINEURAL
Status: COMPLETED
Start: 2019-09-01 | End: 2019-09-01

## 2019-09-01 RX ADMIN — Medication 1 EACH: at 20:00

## 2019-09-01 RX ADMIN — LIDOCAINE HYDROCHLORIDE,EPINEPHRINE BITARTRATE 10 ML: 10; .01 INJECTION, SOLUTION INFILTRATION; PERINEURAL at 20:00

## 2019-09-01 NOTE — ED PROVIDER NOTES
Yes     Partners: Female   Lifestyle    Physical activity:     Days per week: None     Minutes per session: None    Stress: None   Relationships    Social connections:     Talks on phone: None     Gets together: None     Attends Orthodox service: None     Active member of club or organization: None     Attends meetings of clubs or organizations: None     Relationship status: None    Intimate partner violence:     Fear of current or ex partner: None     Emotionally abused: None     Physically abused: None     Forced sexual activity: None   Other Topics Concern    None   Social History Narrative    None       SCREENINGS             PHYSICAL EXAM    (up to 7 for level 4, 8 or more for level 5)     ED Triage Vitals   BP Temp Temp Source Pulse Resp SpO2 Height Weight   08/31/19 1155 08/31/19 1153 08/31/19 1153 08/31/19 1153 08/31/19 1153 08/31/19 1153 08/31/19 1153 08/31/19 1153   (!) 191/80 97.9 °F (36.6 °C) Oral 81 16 94 % 6' (1.829 m) 220 lb (99.8 kg)       Physical Exam   Constitutional: He is oriented to person, place, and time. He appears well-developed and well-nourished. Musculoskeletal: Normal range of motion. He exhibits no deformity. Neurological: He is alert and oriented to person, place, and time. Skin: Skin is warm and dry. Avulsion skin tear on the right forearm. It is approximately 1 inch in diameter there is active oozing from the middle of it. Psychiatric: He has a normal mood and affect. His behavior is normal.   Nursing note and vitals reviewed.       DIAGNOSTIC RESULTS     EKG: All EKG's are interpreted by the Emergency Department Physician who either signs or Co-signs this chart in the absence of a cardiologist.        RADIOLOGY:   Non-plain film images such as CT, Ultrasound and MRI are read by the radiologist. Plainradiographic images are visualized and preliminarily interpreted by the emergency physician with the below findings:        Interpretation per the Radiologist

## 2019-09-02 NOTE — ED PROVIDER NOTES
[facility] EM[facility] DE[facility]  eMERGENCYdEPARTMENT eNCOUnter      Pt Name: Js Matute  MRN: [id number]  [place] 1940  Date of evaluation: 9/1/2019  Provider:ELISABETH Garcia    CHIEF COMPLAINT       Chief Complaint   Patient presents with    Wound Dehiscence     right arm bleeding seen yesterday for wound on right arm that wouldnt stop bleeding. pt bumped again tonight wound bleeding again. HISTORY OF PRESENT ILLNESS  (Location/Symptom, Timing/Onset, Context/Setting, Quality, Duration, Modifying Factors, Severity.)   Js Matute is a 67 y.o. male who presents to the emergency department with chief complaint of a bleeding wound noted to the right mid forearm. Patient reports 2 days ago he got up out of bed to use the restroom and inadvertently scraped his right forearm on the door handle. He reports this bled intermittently to the point that he had to seek medical attention on Saturday in the ED. He reports they performed silver nitrate to the area and the bleeding was controlled. He reports today he bumped this area and it began bleeding again. He reports pressure has not stopped the bleeding. Patient denies any anticoagulopathy. He is not anticoagulated. He does not take any aspirin as he discontinued his aspirin ingestion 7 to 8 years ago. Patient reports he does bleed after the slightest injury. He reports he has some biopsies noted to the extremities and these bled proficiently to the point that he had to have cauterization by his dermatologist.    This is an acute ongoing condition. The history is provided by the patient. Nursing Notes were reviewed and I agree. REVIEW OF SYSTEMS    (2-9 systems for level 4, 10 or more for level 5)     Review of Systems   Skin: Positive for wound (Bleeding sore noted to the right mid forearm. ). Hematological: Bruises/bleeds easily. All other systems reviewed and are negative.        Except as noted above the remainder of

## 2019-09-17 ENCOUNTER — TELEPHONE (OUTPATIENT)
Dept: SURGERY | Age: 79
End: 2019-09-17

## 2019-09-18 ENCOUNTER — TELEPHONE (OUTPATIENT)
Dept: SURGERY | Age: 79
End: 2019-09-18

## 2019-10-02 ENCOUNTER — TELEPHONE (OUTPATIENT)
Dept: GASTROENTEROLOGY | Facility: CLINIC | Age: 79
End: 2019-10-02

## 2019-10-02 DIAGNOSIS — I73.9 PVD (PERIPHERAL VASCULAR DISEASE) (HCC): Primary | ICD-10-CM

## 2019-10-02 NOTE — TELEPHONE ENCOUNTER
Patient called today / left message. He had colonoscopy two years ago with Dr. Mateus Ramey. Patient does not want to be seen here at [facility]. Please do not send him anymore letters.

## 2019-10-03 ENCOUNTER — PROCEDURE VISIT (OUTPATIENT)
Dept: UROLOGY | Age: 79
End: 2019-10-03
Payer: MEDICARE

## 2019-10-03 VITALS — WEIGHT: 233 LBS | HEIGHT: 72 IN | TEMPERATURE: 97.5 F | BODY MASS INDEX: 31.56 KG/M2

## 2019-10-03 DIAGNOSIS — Z85.51 HISTORY OF BLADDER CANCER: ICD-10-CM

## 2019-10-03 LAB
APPEARANCE FLUID: NORMAL
BILIRUBIN, POC: NORMAL
BLOOD URINE, POC: NORMAL
CLARITY, POC: CLEAR
COLOR, POC: YELLOW
GLUCOSE URINE, POC: NORMAL
KETONES, POC: NORMAL
LEUKOCYTE EST, POC: NORMAL
NITRITE, POC: NORMAL
PH, POC: 5.5
PROTEIN, POC: NORMAL
SPECIFIC GRAVITY, POC: 1.02
UROBILINOGEN, POC: 0.2

## 2019-10-03 PROCEDURE — 81002 URINALYSIS NONAUTO W/O SCOPE: CPT | Performed by: UROLOGY

## 2019-10-03 PROCEDURE — 52000 CYSTOURETHROSCOPY: CPT | Performed by: UROLOGY

## 2019-10-03 RX ORDER — LANCETS 30 GAUGE
EACH MISCELLANEOUS
Refills: 4 | Status: ON HOLD | COMMUNITY
Start: 2019-09-13 | End: 2021-01-21 | Stop reason: HOSPADM

## 2019-10-03 RX ORDER — GLIPIZIDE 10 MG/1
10 TABLET, FILM COATED, EXTENDED RELEASE ORAL
Refills: 3 | Status: ON HOLD | COMMUNITY
Start: 2019-08-02 | End: 2021-01-29 | Stop reason: HOSPADM

## 2019-10-03 RX ORDER — BLOOD SUGAR DIAGNOSTIC
STRIP MISCELLANEOUS
Refills: 4 | Status: ON HOLD | COMMUNITY
Start: 2019-09-13 | End: 2021-01-21 | Stop reason: HOSPADM

## 2020-01-13 ENCOUNTER — HOSPITAL ENCOUNTER (OUTPATIENT)
Dept: VASCULAR LAB | Age: 80
Discharge: HOME | End: 2020-01-13
Payer: MEDICARE

## 2020-01-13 PROCEDURE — 93880 EXTRACRANIAL BILAT STUDY: CPT

## 2020-01-20 ENCOUNTER — TELEPHONE (OUTPATIENT)
Dept: UROLOGY | Age: 80
End: 2020-01-20

## 2020-02-27 ENCOUNTER — PROCEDURE VISIT (OUTPATIENT)
Dept: UROLOGY | Age: 80
End: 2020-02-27
Payer: MEDICARE

## 2020-02-27 LAB
BACTERIA URINE, POC: 0
BILIRUBIN URINE: 1 MG/DL
BLOOD, URINE: POSITIVE
CASTS URINE, POC: 0
CLARITY: CLEAR
COLOR: YELLOW
CRYSTALS URINE, POC: 0
EPI CELLS URINE, POC: 1
GLUCOSE URINE: ABNORMAL
KETONES, URINE: POSITIVE
LEUKOCYTE EST, POC: ABNORMAL
NITRITE, URINE: NEGATIVE
PH UA: 5.5 (ref 4.5–8)
PROTEIN UA: POSITIVE
RBC URINE, POC: ABNORMAL
SPECIFIC GRAVITY UA: 1.03 (ref 1–1.03)
UROBILINOGEN, URINE: NORMAL
WBC URINE, POC: 0
YEAST URINE, POC: 0

## 2020-02-27 PROCEDURE — 99214 OFFICE O/P EST MOD 30 MIN: CPT | Performed by: UROLOGY

## 2020-02-27 PROCEDURE — 52000 CYSTOURETHROSCOPY: CPT | Performed by: UROLOGY

## 2020-02-27 PROCEDURE — 4040F PNEUMOC VAC/ADMIN/RCVD: CPT | Performed by: UROLOGY

## 2020-02-27 PROCEDURE — G8484 FLU IMMUNIZE NO ADMIN: HCPCS | Performed by: UROLOGY

## 2020-02-27 PROCEDURE — 1036F TOBACCO NON-USER: CPT | Performed by: UROLOGY

## 2020-02-27 PROCEDURE — 1123F ACP DISCUSS/DSCN MKR DOCD: CPT | Performed by: UROLOGY

## 2020-02-27 PROCEDURE — G8417 CALC BMI ABV UP PARAM F/U: HCPCS | Performed by: UROLOGY

## 2020-02-27 PROCEDURE — G8427 DOCREV CUR MEDS BY ELIG CLIN: HCPCS | Performed by: UROLOGY

## 2020-02-27 PROCEDURE — 81001 URINALYSIS AUTO W/SCOPE: CPT | Performed by: UROLOGY

## 2020-02-27 RX ORDER — TAMSULOSIN HYDROCHLORIDE 0.4 MG/1
CAPSULE ORAL
Qty: 90 CAPSULE | Refills: 3 | Status: ON HOLD | OUTPATIENT
Start: 2020-02-27 | End: 2021-01-29 | Stop reason: HOSPADM

## 2020-02-27 ASSESSMENT — ENCOUNTER SYMPTOMS
VOMITING: 0
ABDOMINAL PAIN: 0
BACK PAIN: 0
SHORTNESS OF BREATH: 0
WHEEZING: 0
EYE PAIN: 0
SINUS PAIN: 0

## 2020-03-04 ENCOUNTER — TELEPHONE (OUTPATIENT)
Dept: UROLOGY | Age: 80
End: 2020-03-04

## 2020-03-04 NOTE — TELEPHONE ENCOUNTER
Ashwin requests that office  return their call. The best time to reach him is Anytime. Patient was told that he needs a 6 week follow up after he takes his medication that Dr Armida Angulo started the patient on 2-27-20. Thank you.

## 2020-03-25 PROBLEM — N17.9 ACUTE KIDNEY INJURY (HCC): Status: RESOLVED | Noted: 2018-10-23 | Resolved: 2020-03-24

## 2020-04-30 ENCOUNTER — TELEPHONE (OUTPATIENT)
Dept: UROLOGY | Age: 80
End: 2020-04-30

## 2020-04-30 NOTE — TELEPHONE ENCOUNTER
Ashwin requests that someone return their call. The best time to reach him is Anytime. The pt wants to know if he needs another PSA prior to the appointment in June. Thank you.

## 2020-04-30 NOTE — TELEPHONE ENCOUNTER
Patient had psa done at his pcp after his last visit with us. He said that is what dr Nataliya Pretty told him to do. I did see where dr Nataliya Pretyt put psa in last note, however it was not ordered. So I told him we have results in chart and we will go by that one. If dr Nataliya Pretty wants another one after seeing him at his next appt we can always send him down for one.

## 2020-06-15 ENCOUNTER — PROCEDURE VISIT (OUTPATIENT)
Dept: UROLOGY | Age: 80
End: 2020-06-15
Payer: MEDICARE

## 2020-06-15 VITALS — BODY MASS INDEX: 32.1 KG/M2 | WEIGHT: 237 LBS | TEMPERATURE: 96.6 F | HEIGHT: 72 IN

## 2020-06-15 PROBLEM — Z85.51 HISTORY OF BLADDER CANCER: Status: ACTIVE | Noted: 2020-06-15

## 2020-06-15 LAB
BILIRUBIN, POC: NORMAL
BLOOD URINE, POC: NORMAL
CLARITY, POC: CLEAR
COLOR, POC: YELLOW
GLUCOSE URINE, POC: NORMAL
KETONES, POC: NORMAL
LEUKOCYTE EST, POC: NORMAL
NITRITE, POC: NORMAL
PH, POC: 5.5
PROTEIN, POC: NORMAL
SPECIFIC GRAVITY, POC: 1.03
UROBILINOGEN, POC: 0.2

## 2020-06-15 PROCEDURE — 81003 URINALYSIS AUTO W/O SCOPE: CPT | Performed by: UROLOGY

## 2020-06-15 PROCEDURE — 52000 CYSTOURETHROSCOPY: CPT | Performed by: UROLOGY

## 2020-06-15 NOTE — PROGRESS NOTES
patient was given a post procedure instructions and follow up. Results for orders placed or performed in visit on 06/15/20   POCT Urinalysis No Micro (Auto)   Result Value Ref Range    Color, UA yellow     Clarity, UA clear     Glucose, UA POC neg     Bilirubin, UA small     Ketones, UA trace     Spec Grav, UA 1.030     Blood, UA POC neg     pH, UA 5.5     Protein, UA POC 300mg     Urobilinogen, UA 0.2     Leukocytes, UA neg     Nitrite, UA neg            1. History of bladder cancer  Return in 3 months for next surveillance cystoscopy no recurrence was seen today. - Cystoscopy  - Cystoscopy  - POCT Urinalysis No Micro (Auto)  - Cytology, Non-Gyn; Future  - Cystoscopy; Future    2. BPH associated with nocturia  He is symptoms are improved on current medication regimen with tamsulosin and Myrbetriq. He will continue this      Orders Placed This Encounter   Procedures    Cytology, Non-Gyn     Prior to next visit in 3 mos     Standing Status:   Future     Standing Expiration Date:   6/15/2021     Order Specific Question:   PREVIOUS BIOPSY     Answer:   Yes     Order Specific Question:   PREOP DIAGNOSIS     Answer:   History of bladder cancer     Order Specific Question:   FROZEN SECTION - NO OR YES/SPECIMEN     Answer:   No    POCT Urinalysis No Micro (Auto)    Cystoscopy    Cystoscopy     Cystoscopy in 3 months     Standing Status:   Future     Standing Expiration Date:   6/15/2021       Return in about 3 months (around 9/15/2020) for Cystoscopy on next visit, Urine Cytology prior to next visit.         Octavio Kang MD

## 2020-07-31 ENCOUNTER — HOSPITAL ENCOUNTER (OUTPATIENT)
Dept: CT IMAGING | Age: 80
Discharge: HOME | End: 2020-07-31
Payer: MEDICARE

## 2020-07-31 ENCOUNTER — HOSPITAL ENCOUNTER (OUTPATIENT)
Dept: GENERAL RADIOLOGY | Age: 80
Discharge: HOME | End: 2020-07-31
Payer: MEDICARE

## 2020-07-31 LAB
GFR AFRICAN AMERICAN: 39
GFR NON-AFRICAN AMERICAN: 32
PERFORMED ON: ABNORMAL
POC CREATININE: 2 MG/DL (ref 0.3–1.3)
POC SAMPLE TYPE: ABNORMAL

## 2020-07-31 PROCEDURE — 71046 X-RAY EXAM CHEST 2 VIEWS: CPT

## 2020-07-31 PROCEDURE — 82565 ASSAY OF CREATININE: CPT

## 2020-07-31 PROCEDURE — 6360000004 HC RX CONTRAST MEDICATION: Performed by: FAMILY MEDICINE

## 2020-07-31 PROCEDURE — 74178 CT ABD&PLV WO CNTR FLWD CNTR: CPT

## 2020-07-31 RX ADMIN — IOPAMIDOL 50 ML: 755 INJECTION, SOLUTION INTRAVENOUS at 15:13

## 2020-09-10 NOTE — TELEPHONE ENCOUNTER
Patient called and needs to reschedule cysto. Please call patient with appt date and time.   Thank you
Patient called and needs to reschedule cysto. Please call patient with appt date and time.   Thank you
Detail Level: Simple
Detail Level: Generalized

## 2020-09-17 ENCOUNTER — PROCEDURE VISIT (OUTPATIENT)
Dept: UROLOGY | Age: 80
End: 2020-09-17
Payer: MEDICARE

## 2020-09-17 VITALS — BODY MASS INDEX: 32.37 KG/M2 | HEIGHT: 72 IN | TEMPERATURE: 97.4 F | WEIGHT: 239 LBS

## 2020-09-17 LAB
BILIRUBIN, POC: NORMAL
BLOOD URINE, POC: NORMAL
CLARITY, POC: CLEAR
COLOR, POC: YELLOW
GLUCOSE URINE, POC: NORMAL
KETONES, POC: NORMAL
LEUKOCYTE EST, POC: NORMAL
NITRITE, POC: NORMAL
PH, POC: 5.5
PROTEIN, POC: NORMAL
SPECIFIC GRAVITY, POC: 1.03
UROBILINOGEN, POC: 1

## 2020-09-17 PROCEDURE — 81003 URINALYSIS AUTO W/O SCOPE: CPT | Performed by: UROLOGY

## 2020-09-17 PROCEDURE — 52000 CYSTOURETHROSCOPY: CPT | Performed by: UROLOGY

## 2020-09-17 RX ORDER — LOSARTAN POTASSIUM 100 MG/1
TABLET ORAL
Status: ON HOLD | COMMUNITY
Start: 2020-08-20 | End: 2021-01-21 | Stop reason: HOSPADM

## 2020-09-17 RX ORDER — HYDROCHLOROTHIAZIDE 12.5 MG/1
TABLET ORAL
Status: ON HOLD | COMMUNITY
Start: 2020-08-31 | End: 2021-01-21 | Stop reason: HOSPADM

## 2020-09-18 NOTE — PROGRESS NOTES
BLADDER CANCER  Patient was first diagnosed with bladder cancer approximately 18 month(s) ago. 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 None  Last upper tract study: 2 year(s) ago. Study was a bilateral retrograde pyelograms done December 2018        Cystoscopy Operative Note (9/17/20)  Surgeon: Radha Donaldson MD  Anesthesia: Urethral 2% Xylocaine   Indications: History of bladder cancer  Position: Supine    Findings:   The patient was prepped and draped in the usual sterile fashion. The flexible cystoscope was advanced through the urethra and into the bladder under direct vision. The bladder was thoroughly inspected and the following was noted:    Residual Urine: mild  Urethra: normal appearing urethra with no masses, tenderness or lesions  Prostate: partially obstructing lateral lobes of prostate; median lobe present? no. Size of median lobe: Extra Small  Bladder: No tumors or CIS noted. No bladder diverticulum. There was mild trabeculation noted. No recurrent papillary tumor seen  Ureters: Clear efflux from both ureters. Orifices with normal configuration and location. The cystoscope was removed. The patient tolerated the procedure well. The patient was given a post procedure instructions and follow up. Results for orders placed or performed in visit on 09/17/20   POCT Urinalysis No Micro (Auto)   Result Value Ref Range    Color, UA yellow     Clarity, UA clear     Glucose, UA POC neg     Bilirubin, UA small     Ketones, UA trace     Spec Grav, UA 1.030     Blood, UA POC neg     pH, UA 5.5     Protein, UA POC 100mg     Urobilinogen, UA 1.0     Leukocytes, UA neg     Nitrite, UA neg            1.  History of bladder cancer  Cystoscopy negative today he will follow-up in 3 months for surveillance if that is negative he will need upper tract imaging in his interval could be increased to 6 months  - POCT Urinalysis No Micro (Auto)  - Cytology, Non-Gyn; Future  - Cystoscopy; Future  - Cytology, Non-Gyn      Orders Placed This Encounter   Procedures    Cytology, Non-Gyn     Prior to next visit in 3 mos     Standing Status:   Future     Standing Expiration Date:   9/17/2021     Order Specific Question:   PREVIOUS BIOPSY     Answer:   Yes     Order Specific Question:   PREOP DIAGNOSIS     Answer:   History of bladder cancer     Order Specific Question:   FROZEN SECTION - NO OR YES/SPECIMEN     Answer:   No    POCT Urinalysis No Micro (Auto)    Cystoscopy     Cystoscopy in 3 months     Standing Status:   Future     Standing Expiration Date:   9/17/2021       Return in about 3 months (around 12/17/2020) for Cystoscopy on next visit, Urine Cytology prior to next visit.         Fady Rey MD

## 2020-09-22 ENCOUNTER — TRANSCRIBE ORDERS (OUTPATIENT)
Dept: ADMINISTRATIVE | Facility: HOSPITAL | Age: 80
End: 2020-09-22

## 2020-09-22 DIAGNOSIS — M79.605 LEFT LEG PAIN: Primary | ICD-10-CM

## 2020-09-23 ENCOUNTER — HOSPITAL ENCOUNTER (OUTPATIENT)
Dept: ULTRASOUND IMAGING | Facility: HOSPITAL | Age: 80
Discharge: HOME | End: 2020-09-23
Admitting: FAMILY MEDICINE

## 2020-09-23 PROCEDURE — 93971 EXTREMITY STUDY: CPT

## 2020-11-12 ENCOUNTER — APPOINTMENT (OUTPATIENT)
Dept: GENERAL RADIOLOGY | Age: 80
End: 2020-11-12
Payer: MEDICARE

## 2020-11-12 ENCOUNTER — HOSPITAL ENCOUNTER (EMERGENCY)
Age: 80
Discharge: HOME | End: 2020-11-12
Attending: EMERGENCY MEDICINE
Payer: MEDICARE

## 2020-11-12 VITALS
DIASTOLIC BLOOD PRESSURE: 74 MMHG | BODY MASS INDEX: 31.02 KG/M2 | TEMPERATURE: 98.4 F | WEIGHT: 229 LBS | RESPIRATION RATE: 20 BRPM | HEART RATE: 66 BPM | SYSTOLIC BLOOD PRESSURE: 156 MMHG | OXYGEN SATURATION: 95 % | HEIGHT: 72 IN

## 2020-11-12 PROCEDURE — 73560 X-RAY EXAM OF KNEE 1 OR 2: CPT

## 2020-11-12 PROCEDURE — 6360000002 HC RX W HCPCS: Performed by: EMERGENCY MEDICINE

## 2020-11-12 PROCEDURE — 99999 PR OFFICE/OUTPT VISIT,PROCEDURE ONLY: CPT | Performed by: EMERGENCY MEDICINE

## 2020-11-12 PROCEDURE — 90715 TDAP VACCINE 7 YRS/> IM: CPT | Performed by: EMERGENCY MEDICINE

## 2020-11-12 PROCEDURE — 96372 THER/PROPH/DIAG INJ SC/IM: CPT

## 2020-11-12 PROCEDURE — 12032 INTMD RPR S/A/T/EXT 2.6-7.5: CPT

## 2020-11-12 PROCEDURE — 2500000003 HC RX 250 WO HCPCS: Performed by: EMERGENCY MEDICINE

## 2020-11-12 PROCEDURE — 99283 EMERGENCY DEPT VISIT LOW MDM: CPT

## 2020-11-12 PROCEDURE — 90471 IMMUNIZATION ADMIN: CPT | Performed by: EMERGENCY MEDICINE

## 2020-11-12 RX ORDER — LIDOCAINE HYDROCHLORIDE AND EPINEPHRINE 10; 10 MG/ML; UG/ML
20 INJECTION, SOLUTION INFILTRATION; PERINEURAL ONCE
Status: COMPLETED | OUTPATIENT
Start: 2020-11-12 | End: 2020-11-12

## 2020-11-12 RX ADMIN — TETANUS TOXOID, REDUCED DIPHTHERIA TOXOID AND ACELLULAR PERTUSSIS VACCINE, ADSORBED 0.5 ML: 5; 2.5; 8; 8; 2.5 SUSPENSION INTRAMUSCULAR at 15:43

## 2020-11-12 RX ADMIN — LIDOCAINE HYDROCHLORIDE AND EPINEPHRINE 20 ML: 10; 10 INJECTION, SOLUTION INFILTRATION; PERINEURAL at 15:42

## 2020-11-12 ASSESSMENT — PAIN SCALES - GENERAL: PAINLEVEL_OUTOF10: 8

## 2020-11-12 NOTE — ED NOTES
Call placed to Ortho answering service @ [phone].  with Ortho spoke with  on the phone @ [phone]. Electronically signed by Carmela Thompson on 11/12/2020 at 4:55 PM       Carmela Thompson  11/12/20 [phone]
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
## 2020-12-21 ENCOUNTER — PROCEDURE VISIT (OUTPATIENT)
Dept: UROLOGY | Age: 80
End: 2020-12-21
Payer: MEDICARE

## 2020-12-21 VITALS — WEIGHT: 229 LBS | TEMPERATURE: 97.6 F | BODY MASS INDEX: 31.02 KG/M2 | HEIGHT: 72 IN

## 2020-12-21 LAB
BILIRUBIN, POC: NORMAL
BLOOD URINE, POC: NORMAL
CLARITY, POC: CLEAR
COLOR, POC: YELLOW
GLUCOSE URINE, POC: NORMAL
KETONES, POC: NORMAL
LEUKOCYTE EST, POC: NORMAL
NITRITE, POC: NORMAL
PH, POC: 5
PROTEIN, POC: NORMAL
SPECIFIC GRAVITY, POC: 1.03
UROBILINOGEN, POC: 0.2

## 2020-12-21 PROCEDURE — 81003 URINALYSIS AUTO W/O SCOPE: CPT | Performed by: UROLOGY

## 2020-12-21 PROCEDURE — 99999 PR OFFICE/OUTPT VISIT,PROCEDURE ONLY: CPT | Performed by: UROLOGY

## 2021-01-17 ENCOUNTER — HOSPITAL ENCOUNTER (INPATIENT)
Age: 81
LOS: 3 days | Discharge: NURSING, REHAB OR LONG-TERM CARE | DRG: 871 | End: 2021-01-21
Attending: EMERGENCY MEDICINE | Admitting: INTERNAL MEDICINE
Payer: MEDICARE

## 2021-01-17 ENCOUNTER — APPOINTMENT (OUTPATIENT)
Dept: GENERAL RADIOLOGY | Age: 81
DRG: 871 | End: 2021-01-17
Payer: MEDICARE

## 2021-01-17 ENCOUNTER — APPOINTMENT (OUTPATIENT)
Dept: CT IMAGING | Age: 81
DRG: 871 | End: 2021-01-17
Payer: MEDICARE

## 2021-01-17 DIAGNOSIS — J18.9 PNEUMONITIS: ICD-10-CM

## 2021-01-17 DIAGNOSIS — J96.02 ACUTE RESPIRATORY FAILURE WITH HYPOXIA AND HYPERCAPNIA (HCC): Primary | ICD-10-CM

## 2021-01-17 DIAGNOSIS — J96.01 ACUTE RESPIRATORY FAILURE WITH HYPOXIA AND HYPERCAPNIA (HCC): Primary | ICD-10-CM

## 2021-01-17 DIAGNOSIS — R41.82 ALTERED MENTAL STATUS, UNSPECIFIED ALTERED MENTAL STATUS TYPE: ICD-10-CM

## 2021-01-17 DIAGNOSIS — E16.2 HYPOGLYCEMIA: ICD-10-CM

## 2021-01-17 LAB
ADENOVIRUS BY PCR: NOT DETECTED
ALBUMIN SERPL-MCNC: 3.5 G/DL (ref 3.5–5.2)
ALP BLD-CCNC: 119 U/L (ref 40–130)
ALT SERPL-CCNC: 35 U/L (ref 5–41)
ANION GAP SERPL CALCULATED.3IONS-SCNC: 7 MMOL/L (ref 7–19)
AST SERPL-CCNC: 29 U/L (ref 5–40)
ATYPICAL LYMPHOCYTE RELATIVE PERCENT: 2 % (ref 0–8)
BACTERIA: NEGATIVE /HPF
BASE EXCESS ARTERIAL: 5.4 MMOL/L (ref -2–2)
BASOPHILS ABSOLUTE: 0.1 K/UL (ref 0–0.2)
BASOPHILS RELATIVE PERCENT: 1 % (ref 0–1)
BILIRUB SERPL-MCNC: 0.3 MG/DL (ref 0.2–1.2)
BILIRUBIN URINE: NEGATIVE
BLOOD, URINE: NEGATIVE
BORDETELLA PARAPERTUSSIS BY PCR: NOT DETECTED
BORDETELLA PERTUSSIS BY PCR: NOT DETECTED
BUN BLDV-MCNC: 65 MG/DL (ref 8–23)
CALCIUM SERPL-MCNC: 8.9 MG/DL (ref 8.8–10.2)
CARBOXYHEMOGLOBIN ARTERIAL: 2.1 % (ref 0–5)
CHLAMYDOPHILIA PNEUMONIAE BY PCR: NOT DETECTED
CHLORIDE BLD-SCNC: 100 MMOL/L (ref 98–111)
CLARITY: CLEAR
CO2: 33 MMOL/L (ref 22–29)
COLOR: YELLOW
CORONAVIRUS 229E BY PCR: NOT DETECTED
CORONAVIRUS HKU1 BY PCR: NOT DETECTED
CORONAVIRUS NL63 BY PCR: NOT DETECTED
CORONAVIRUS OC43 BY PCR: NOT DETECTED
CORRECTED WBC: 5.9 K/UL (ref 4.8–10.8)
CREAT SERPL-MCNC: 2.4 MG/DL (ref 0.5–1.2)
CRYSTALS, UA: ABNORMAL /HPF
EOSINOPHILS ABSOLUTE: 0.24 K/UL (ref 0–0.6)
EOSINOPHILS RELATIVE PERCENT: 4 % (ref 0–5)
EPITHELIAL CELLS, UA: 2 /HPF (ref 0–5)
GFR AFRICAN AMERICAN: 32
GFR NON-AFRICAN AMERICAN: 26
GLUCOSE BLD-MCNC: 41 MG/DL (ref 74–109)
GLUCOSE URINE: NEGATIVE MG/DL
HCO3 ARTERIAL: 35.5 MMOL/L (ref 22–26)
HCT VFR BLD CALC: 48 % (ref 42–52)
HEMOGLOBIN, ART, EXTENDED: 14.3 G/DL (ref 14–18)
HEMOGLOBIN: 14.2 G/DL (ref 14–18)
HUMAN METAPNEUMOVIRUS BY PCR: NOT DETECTED
HUMAN RHINOVIRUS/ENTEROVIRUS BY PCR: NOT DETECTED
HYALINE CASTS: 4 /HPF (ref 0–8)
IMMATURE GRANULOCYTES #: 1.3 K/UL
INFLUENZA A BY PCR: NOT DETECTED
INFLUENZA B BY PCR: NOT DETECTED
KETONES, URINE: NEGATIVE MG/DL
LEUKOCYTE ESTERASE, URINE: ABNORMAL
LYMPHOCYTES ABSOLUTE: 0.6 K/UL (ref 1.1–4.5)
LYMPHOCYTES RELATIVE PERCENT: 8 % (ref 20–40)
MCH RBC QN AUTO: 28.3 PG (ref 27–31)
MCHC RBC AUTO-ENTMCNC: 29.6 G/DL (ref 33–37)
MCV RBC AUTO: 95.6 FL (ref 80–94)
METAMYELOCYTES RELATIVE PERCENT: 1 %
METHEMOGLOBIN ARTERIAL: 0.5 %
MONOCYTES ABSOLUTE: 0.7 K/UL (ref 0–0.9)
MONOCYTES RELATIVE PERCENT: 12 % (ref 0–10)
MYCOPLASMA PNEUMONIAE BY PCR: NOT DETECTED
NEUTROPHILS ABSOLUTE: 4.3 K/UL (ref 1.5–7.5)
NEUTROPHILS RELATIVE PERCENT: 72 % (ref 50–65)
NITRITE, URINE: NEGATIVE
NUCLEATED RED BLOOD CELLS: 100 /100 WBC
O2 CONTENT ARTERIAL: 15 ML/DL
O2 SAT, ARTERIAL: 74.6 %
O2 THERAPY: ABNORMAL
PARAINFLUENZA VIRUS 1 BY PCR: NOT DETECTED
PARAINFLUENZA VIRUS 2 BY PCR: NOT DETECTED
PARAINFLUENZA VIRUS 3 BY PCR: NOT DETECTED
PARAINFLUENZA VIRUS 4 BY PCR: NOT DETECTED
PCO2 ARTERIAL: 79 MMHG (ref 35–45)
PDW BLD-RTO: 13.3 % (ref 11.5–14.5)
PH ARTERIAL: 7.26 (ref 7.35–7.45)
PH UA: 5 (ref 5–8)
PLATELET # BLD: 228 K/UL (ref 130–400)
PLATELET SLIDE REVIEW: ADEQUATE
PMV BLD AUTO: 10.9 FL (ref 9.4–12.4)
PO2 ARTERIAL: 43 MMHG (ref 80–100)
POTASSIUM SERPL-SCNC: 4.1 MMOL/L (ref 3.5–5)
POTASSIUM, WHOLE BLOOD: 3.5
PRO-BNP: 7756 PG/ML (ref 0–1800)
PROTEIN UA: 100 MG/DL
RBC # BLD: 5.02 M/UL (ref 4.7–6.1)
RBC # BLD: NORMAL 10*6/UL
RBC UA: 2 /HPF (ref 0–4)
RESPIRATORY SYNCYTIAL VIRUS BY PCR: NOT DETECTED
SARS-COV-2, PCR: NOT DETECTED
SODIUM BLD-SCNC: 140 MMOL/L (ref 136–145)
SPECIFIC GRAVITY UA: 1.02 (ref 1–1.03)
TOTAL PROTEIN: 6.7 G/DL (ref 6.6–8.7)
TOXIC GRANULATION: ABNORMAL
UROBILINOGEN, URINE: 1 E.U./DL
WBC # BLD: 11.7 K/UL (ref 4.8–10.8)
WBC UA: 5 /HPF (ref 0–5)

## 2021-01-17 PROCEDURE — 99285 EMERGENCY DEPT VISIT HI MDM: CPT

## 2021-01-17 PROCEDURE — 85025 COMPLETE CBC W/AUTO DIFF WBC: CPT

## 2021-01-17 PROCEDURE — 96374 THER/PROPH/DIAG INJ IV PUSH: CPT

## 2021-01-17 PROCEDURE — 83880 ASSAY OF NATRIURETIC PEPTIDE: CPT

## 2021-01-17 PROCEDURE — 93005 ELECTROCARDIOGRAM TRACING: CPT | Performed by: EMERGENCY MEDICINE

## 2021-01-17 PROCEDURE — 6370000000 HC RX 637 (ALT 250 FOR IP): Performed by: EMERGENCY MEDICINE

## 2021-01-17 PROCEDURE — 36600 WITHDRAWAL OF ARTERIAL BLOOD: CPT

## 2021-01-17 PROCEDURE — 36415 COLL VENOUS BLD VENIPUNCTURE: CPT

## 2021-01-17 PROCEDURE — 71045 X-RAY EXAM CHEST 1 VIEW: CPT

## 2021-01-17 PROCEDURE — 80053 COMPREHEN METABOLIC PANEL: CPT

## 2021-01-17 PROCEDURE — 0202U NFCT DS 22 TRGT SARS-COV-2: CPT

## 2021-01-17 PROCEDURE — 82803 BLOOD GASES ANY COMBINATION: CPT

## 2021-01-17 PROCEDURE — 81001 URINALYSIS AUTO W/SCOPE: CPT

## 2021-01-17 PROCEDURE — 2580000003 HC RX 258: Performed by: EMERGENCY MEDICINE

## 2021-01-17 PROCEDURE — 70450 CT HEAD/BRAIN W/O DYE: CPT

## 2021-01-17 PROCEDURE — 84132 ASSAY OF SERUM POTASSIUM: CPT

## 2021-01-17 RX ORDER — CIPROFLOXACIN 500 MG/1
500 TABLET, FILM COATED ORAL 2 TIMES DAILY
Status: ON HOLD | COMMUNITY
End: 2021-01-21 | Stop reason: HOSPADM

## 2021-01-17 RX ORDER — DEXTROSE MONOHYDRATE 25 G/50ML
25 INJECTION, SOLUTION INTRAVENOUS ONCE
Status: COMPLETED | OUTPATIENT
Start: 2021-01-17 | End: 2021-01-17

## 2021-01-17 RX ORDER — NALOXONE HYDROCHLORIDE 0.4 MG/ML
INJECTION, SOLUTION INTRAMUSCULAR; INTRAVENOUS; SUBCUTANEOUS
Status: DISPENSED
Start: 2021-01-17 | End: 2021-01-18

## 2021-01-17 RX ORDER — ALBUTEROL SULFATE 90 UG/1
2 AEROSOL, METERED RESPIRATORY (INHALATION) ONCE
Status: COMPLETED | OUTPATIENT
Start: 2021-01-17 | End: 2021-01-17

## 2021-01-17 RX ORDER — ASPIRIN 81 MG/1
81 TABLET ORAL DAILY
Status: ON HOLD | COMMUNITY
End: 2021-01-29 | Stop reason: HOSPADM

## 2021-01-17 RX ADMIN — ALBUTEROL SULFATE 2 PUFF: 90 AEROSOL, METERED RESPIRATORY (INHALATION) at 23:19

## 2021-01-17 RX ADMIN — DEXTROSE MONOHYDRATE 25 G: 25 INJECTION, SOLUTION INTRAVENOUS at 23:37

## 2021-01-17 ASSESSMENT — ENCOUNTER SYMPTOMS
VOMITING: 0
NAUSEA: 0

## 2021-01-18 ENCOUNTER — APPOINTMENT (OUTPATIENT)
Dept: GENERAL RADIOLOGY | Age: 81
DRG: 871 | End: 2021-01-18
Payer: MEDICARE

## 2021-01-18 PROBLEM — N17.9 ACUTE ON CHRONIC KIDNEY FAILURE (HCC): Status: ACTIVE | Noted: 2021-01-18

## 2021-01-18 PROBLEM — E87.4 ACIDOSIS, METABOLIC, WITH RESPIRATORY ACIDOSIS: Status: ACTIVE | Noted: 2021-01-18

## 2021-01-18 PROBLEM — N18.9 ACUTE ON CHRONIC KIDNEY FAILURE (HCC): Status: ACTIVE | Noted: 2021-01-18

## 2021-01-18 PROBLEM — R09.02 HYPOXEMIA: Status: ACTIVE | Noted: 2021-01-18

## 2021-01-18 PROBLEM — R65.20 SEVERE SEPSIS (HCC): Status: ACTIVE | Noted: 2021-01-18

## 2021-01-18 PROBLEM — E87.20 METABOLIC ACIDOSIS: Status: ACTIVE | Noted: 2021-01-18

## 2021-01-18 PROBLEM — A41.9 SEVERE SEPSIS (HCC): Status: ACTIVE | Noted: 2021-01-18

## 2021-01-18 PROBLEM — N39.0 UTI (URINARY TRACT INFECTION): Status: ACTIVE | Noted: 2021-01-18

## 2021-01-18 PROBLEM — J96.20 ACUTE ON CHRONIC RESPIRATORY FAILURE (HCC): Status: ACTIVE | Noted: 2021-01-18

## 2021-01-18 LAB
AMMONIA: 29 UMOL/L (ref 16–60)
BASE EXCESS ARTERIAL: 2.7 MMOL/L (ref -2–2)
BASE EXCESS ARTERIAL: 5.6 MMOL/L (ref -2–2)
CARBOXYHEMOGLOBIN ARTERIAL: 1.5 % (ref 0–5)
CARBOXYHEMOGLOBIN ARTERIAL: 1.6 % (ref 0–5)
EKG P AXIS: -26 DEGREES
EKG P-R INTERVAL: 108 MS
EKG Q-T INTERVAL: 516 MS
EKG QRS DURATION: 136 MS
EKG QTC CALCULATION (BAZETT): 501 MS
EKG T AXIS: 4 DEGREES
GLUCOSE BLD-MCNC: 112 MG/DL (ref 70–99)
GLUCOSE BLD-MCNC: 114 MG/DL (ref 70–99)
GLUCOSE BLD-MCNC: 115 MG/DL (ref 70–99)
GLUCOSE BLD-MCNC: 126 MG/DL (ref 70–99)
GLUCOSE BLD-MCNC: 134 MG/DL (ref 70–99)
GLUCOSE BLD-MCNC: 137 MG/DL (ref 70–99)
GLUCOSE BLD-MCNC: 48 MG/DL (ref 70–99)
GLUCOSE BLD-MCNC: 49 MG/DL (ref 70–99)
GLUCOSE BLD-MCNC: 63 MG/DL (ref 70–99)
GLUCOSE BLD-MCNC: 74 MG/DL (ref 70–99)
HCO3 ARTERIAL: 29.8 MMOL/L (ref 22–26)
HCO3 ARTERIAL: 38.2 MMOL/L (ref 22–26)
HEMOGLOBIN, ART, EXTENDED: 14.7 G/DL (ref 14–18)
HEMOGLOBIN, ART, EXTENDED: 15 G/DL (ref 14–18)
METHEMOGLOBIN ARTERIAL: 0.6 %
METHEMOGLOBIN ARTERIAL: 0.7 %
O2 CONTENT ARTERIAL: 19.3 ML/DL
O2 CONTENT ARTERIAL: 20.3 ML/DL
O2 SAT, ARTERIAL: 93.2 %
O2 SAT, ARTERIAL: 95.9 %
O2 THERAPY: ABNORMAL
O2 THERAPY: ABNORMAL
PCO2 ARTERIAL: 138 MMHG (ref 35–45)
PCO2 ARTERIAL: 41 MMHG (ref 35–45)
PERFORMED ON: ABNORMAL
PERFORMED ON: NORMAL
PH ARTERIAL: 7.05 (ref 7.35–7.45)
PH ARTERIAL: 7.47 (ref 7.35–7.45)
PO2 ARTERIAL: 105 MMHG (ref 80–100)
PO2 ARTERIAL: 95 MMHG (ref 80–100)
POTASSIUM, WHOLE BLOOD: 4.1
POTASSIUM, WHOLE BLOOD: 4.3

## 2021-01-18 PROCEDURE — 2500000003 HC RX 250 WO HCPCS: Performed by: EMERGENCY MEDICINE

## 2021-01-18 PROCEDURE — 94002 VENT MGMT INPAT INIT DAY: CPT

## 2021-01-18 PROCEDURE — 2500000003 HC RX 250 WO HCPCS: Performed by: INTERNAL MEDICINE

## 2021-01-18 PROCEDURE — 71045 X-RAY EXAM CHEST 1 VIEW: CPT

## 2021-01-18 PROCEDURE — 36600 WITHDRAWAL OF ARTERIAL BLOOD: CPT

## 2021-01-18 PROCEDURE — 2580000003 HC RX 258: Performed by: INTERNAL MEDICINE

## 2021-01-18 PROCEDURE — 87040 BLOOD CULTURE FOR BACTERIA: CPT

## 2021-01-18 PROCEDURE — 31500 INSERT EMERGENCY AIRWAY: CPT

## 2021-01-18 PROCEDURE — 82803 BLOOD GASES ANY COMBINATION: CPT

## 2021-01-18 PROCEDURE — 6360000002 HC RX W HCPCS: Performed by: INTERNAL MEDICINE

## 2021-01-18 PROCEDURE — 2000000000 HC ICU R&B

## 2021-01-18 PROCEDURE — 36415 COLL VENOUS BLD VENIPUNCTURE: CPT

## 2021-01-18 PROCEDURE — P9047 ALBUMIN (HUMAN), 25%, 50ML: HCPCS | Performed by: INTERNAL MEDICINE

## 2021-01-18 PROCEDURE — 93010 ELECTROCARDIOGRAM REPORT: CPT | Performed by: INTERNAL MEDICINE

## 2021-01-18 PROCEDURE — 2700000000 HC OXYGEN THERAPY PER DAY

## 2021-01-18 PROCEDURE — 84132 ASSAY OF SERUM POTASSIUM: CPT

## 2021-01-18 PROCEDURE — 5A1945Z RESPIRATORY VENTILATION, 24-96 CONSECUTIVE HOURS: ICD-10-PCS | Performed by: EMERGENCY MEDICINE

## 2021-01-18 PROCEDURE — 51702 INSERT TEMP BLADDER CATH: CPT

## 2021-01-18 PROCEDURE — 82947 ASSAY GLUCOSE BLOOD QUANT: CPT

## 2021-01-18 PROCEDURE — 0BH17EZ INSERTION OF ENDOTRACHEAL AIRWAY INTO TRACHEA, VIA NATURAL OR ARTIFICIAL OPENING: ICD-10-PCS | Performed by: EMERGENCY MEDICINE

## 2021-01-18 PROCEDURE — 6370000000 HC RX 637 (ALT 250 FOR IP): Performed by: EMERGENCY MEDICINE

## 2021-01-18 PROCEDURE — 96375 TX/PRO/DX INJ NEW DRUG ADDON: CPT

## 2021-01-18 PROCEDURE — 94640 AIRWAY INHALATION TREATMENT: CPT

## 2021-01-18 PROCEDURE — 6360000002 HC RX W HCPCS: Performed by: EMERGENCY MEDICINE

## 2021-01-18 PROCEDURE — 6360000002 HC RX W HCPCS

## 2021-01-18 PROCEDURE — 2580000003 HC RX 258

## 2021-01-18 PROCEDURE — 82140 ASSAY OF AMMONIA: CPT

## 2021-01-18 RX ORDER — DEXTROSE MONOHYDRATE 25 G/50ML
25 INJECTION, SOLUTION INTRAVENOUS PRN
Status: DISCONTINUED | OUTPATIENT
Start: 2021-01-18 | End: 2021-01-21 | Stop reason: HOSPADM

## 2021-01-18 RX ORDER — POTASSIUM CHLORIDE 7.45 MG/ML
10 INJECTION INTRAVENOUS PRN
Status: DISCONTINUED | OUTPATIENT
Start: 2021-01-18 | End: 2021-01-21 | Stop reason: HOSPADM

## 2021-01-18 RX ORDER — SODIUM CHLORIDE 0.9 % (FLUSH) 0.9 %
10 SYRINGE (ML) INJECTION EVERY 12 HOURS SCHEDULED
Status: DISCONTINUED | OUTPATIENT
Start: 2021-01-18 | End: 2021-01-21 | Stop reason: HOSPADM

## 2021-01-18 RX ORDER — NALOXONE HYDROCHLORIDE 1 MG/ML
1 INJECTION INTRAMUSCULAR; INTRAVENOUS; SUBCUTANEOUS ONCE
Status: COMPLETED | OUTPATIENT
Start: 2021-01-18 | End: 2021-01-18

## 2021-01-18 RX ORDER — ONDANSETRON 2 MG/ML
4 INJECTION INTRAMUSCULAR; INTRAVENOUS EVERY 6 HOURS PRN
Status: DISCONTINUED | OUTPATIENT
Start: 2021-01-18 | End: 2021-01-21 | Stop reason: HOSPADM

## 2021-01-18 RX ORDER — ETOMIDATE 2 MG/ML
10 INJECTION INTRAVENOUS ONCE
Status: DISCONTINUED | OUTPATIENT
Start: 2021-01-18 | End: 2021-01-18

## 2021-01-18 RX ORDER — ACETAMINOPHEN 650 MG/1
650 SUPPOSITORY RECTAL EVERY 6 HOURS PRN
Status: DISCONTINUED | OUTPATIENT
Start: 2021-01-18 | End: 2021-01-21 | Stop reason: HOSPADM

## 2021-01-18 RX ORDER — PROPOFOL 10 MG/ML
10 INJECTION, EMULSION INTRAVENOUS
Status: DISCONTINUED | OUTPATIENT
Start: 2021-01-18 | End: 2021-01-19

## 2021-01-18 RX ORDER — IPRATROPIUM BROMIDE AND ALBUTEROL SULFATE 2.5; .5 MG/3ML; MG/3ML
1 SOLUTION RESPIRATORY (INHALATION) ONCE
Status: COMPLETED | OUTPATIENT
Start: 2021-01-18 | End: 2021-01-18

## 2021-01-18 RX ORDER — VECURONIUM BROMIDE 1 MG/ML
10 INJECTION, POWDER, LYOPHILIZED, FOR SOLUTION INTRAVENOUS ONCE
Status: COMPLETED | OUTPATIENT
Start: 2021-01-18 | End: 2021-01-18

## 2021-01-18 RX ORDER — MAGNESIUM SULFATE IN WATER 40 MG/ML
2 INJECTION, SOLUTION INTRAVENOUS PRN
Status: DISCONTINUED | OUTPATIENT
Start: 2021-01-18 | End: 2021-01-21 | Stop reason: HOSPADM

## 2021-01-18 RX ORDER — POTASSIUM CHLORIDE 20 MEQ/1
40 TABLET, EXTENDED RELEASE ORAL PRN
Status: DISCONTINUED | OUTPATIENT
Start: 2021-01-18 | End: 2021-01-21 | Stop reason: HOSPADM

## 2021-01-18 RX ORDER — SUCCINYLCHOLINE CHLORIDE 20 MG/ML
100 INJECTION INTRAMUSCULAR; INTRAVENOUS ONCE
Status: COMPLETED | OUTPATIENT
Start: 2021-01-18 | End: 2021-01-18

## 2021-01-18 RX ORDER — ETOMIDATE 2 MG/ML
20 INJECTION INTRAVENOUS ONCE
Status: COMPLETED | OUTPATIENT
Start: 2021-01-18 | End: 2021-01-18

## 2021-01-18 RX ORDER — PROPOFOL 10 MG/ML
INJECTION, EMULSION INTRAVENOUS
Status: COMPLETED
Start: 2021-01-18 | End: 2021-01-18

## 2021-01-18 RX ORDER — DEXTROSE MONOHYDRATE 25 G/50ML
INJECTION, SOLUTION INTRAVENOUS
Status: COMPLETED
Start: 2021-01-18 | End: 2021-01-18

## 2021-01-18 RX ORDER — SODIUM CHLORIDE 9 MG/ML
INJECTION, SOLUTION INTRAVENOUS CONTINUOUS
Status: DISCONTINUED | OUTPATIENT
Start: 2021-01-18 | End: 2021-01-21 | Stop reason: HOSPADM

## 2021-01-18 RX ORDER — DEXTROSE AND SODIUM CHLORIDE 5; .45 G/100ML; G/100ML
INJECTION, SOLUTION INTRAVENOUS CONTINUOUS
Status: DISCONTINUED | OUTPATIENT
Start: 2021-01-18 | End: 2021-01-18

## 2021-01-18 RX ORDER — ACETAMINOPHEN 325 MG/1
650 TABLET ORAL EVERY 6 HOURS PRN
Status: DISCONTINUED | OUTPATIENT
Start: 2021-01-18 | End: 2021-01-21 | Stop reason: HOSPADM

## 2021-01-18 RX ORDER — POLYETHYLENE GLYCOL 3350 17 G/17G
17 POWDER, FOR SOLUTION ORAL DAILY PRN
Status: DISCONTINUED | OUTPATIENT
Start: 2021-01-18 | End: 2021-01-21 | Stop reason: HOSPADM

## 2021-01-18 RX ORDER — ALBUMIN (HUMAN) 12.5 G/50ML
25 SOLUTION INTRAVENOUS ONCE
Status: COMPLETED | OUTPATIENT
Start: 2021-01-18 | End: 2021-01-18

## 2021-01-18 RX ORDER — SODIUM CHLORIDE 0.9 % (FLUSH) 0.9 %
10 SYRINGE (ML) INJECTION PRN
Status: DISCONTINUED | OUTPATIENT
Start: 2021-01-18 | End: 2021-01-21 | Stop reason: HOSPADM

## 2021-01-18 RX ORDER — PROPOFOL 10 MG/ML
10 INJECTION, EMULSION INTRAVENOUS ONCE
Status: COMPLETED | OUTPATIENT
Start: 2021-01-18 | End: 2021-01-18

## 2021-01-18 RX ORDER — PROPOFOL 10 MG/ML
10 INJECTION, EMULSION INTRAVENOUS ONCE
Status: DISCONTINUED | OUTPATIENT
Start: 2021-01-18 | End: 2021-01-18 | Stop reason: SDUPTHER

## 2021-01-18 RX ADMIN — SODIUM CHLORIDE: 9 INJECTION, SOLUTION INTRAVENOUS at 23:07

## 2021-01-18 RX ADMIN — DEXTROSE MONOHYDRATE 25 G: 25 INJECTION, SOLUTION INTRAVENOUS at 22:51

## 2021-01-18 RX ADMIN — SODIUM CHLORIDE: 9 INJECTION, SOLUTION INTRAVENOUS at 15:24

## 2021-01-18 RX ADMIN — DEXTROSE AND SODIUM CHLORIDE: 5; 450 INJECTION, SOLUTION INTRAVENOUS at 03:23

## 2021-01-18 RX ADMIN — DEXTROSE MONOHYDRATE 25 G: 25 INJECTION, SOLUTION INTRAVENOUS at 15:37

## 2021-01-18 RX ADMIN — VECURONIUM BROMIDE 10 MG: 1 INJECTION, POWDER, LYOPHILIZED, FOR SOLUTION INTRAVENOUS at 02:05

## 2021-01-18 RX ADMIN — ETOMIDATE 20 MG: 2 INJECTION, SOLUTION INTRAVENOUS at 02:56

## 2021-01-18 RX ADMIN — ALBUMIN (HUMAN) 25 G: 0.25 INJECTION, SOLUTION INTRAVENOUS at 04:02

## 2021-01-18 RX ADMIN — IPRATROPIUM BROMIDE AND ALBUTEROL SULFATE 1 AMPULE: .5; 3 SOLUTION RESPIRATORY (INHALATION) at 00:45

## 2021-01-18 RX ADMIN — PIPERACILLIN AND TAZOBACTAM 3.38 G: 3; .375 INJECTION, POWDER, LYOPHILIZED, FOR SOLUTION INTRAVENOUS at 21:57

## 2021-01-18 RX ADMIN — FAMOTIDINE 20 MG: 10 INJECTION, SOLUTION INTRAVENOUS at 08:15

## 2021-01-18 RX ADMIN — PROPOFOL 30 MCG/KG/MIN: 10 INJECTION, EMULSION INTRAVENOUS at 15:52

## 2021-01-18 RX ADMIN — PIPERACILLIN AND TAZOBACTAM 3.38 G: 3; .375 INJECTION, POWDER, LYOPHILIZED, FOR SOLUTION INTRAVENOUS at 07:08

## 2021-01-18 RX ADMIN — SODIUM CHLORIDE, PRESERVATIVE FREE 10 ML: 5 INJECTION INTRAVENOUS at 08:15

## 2021-01-18 RX ADMIN — PROPOFOL 30 MCG/KG/MIN: 10 INJECTION, EMULSION INTRAVENOUS at 05:13

## 2021-01-18 RX ADMIN — DEXTROSE MONOHYDRATE 25 G: 25 INJECTION, SOLUTION INTRAVENOUS at 11:37

## 2021-01-18 RX ADMIN — SODIUM CHLORIDE: 9 INJECTION, SOLUTION INTRAVENOUS at 08:16

## 2021-01-18 RX ADMIN — ENOXAPARIN SODIUM 40 MG: 40 INJECTION SUBCUTANEOUS at 08:15

## 2021-01-18 RX ADMIN — PROPOFOL 10 MCG/KG/MIN: 10 INJECTION, EMULSION INTRAVENOUS at 01:53

## 2021-01-18 RX ADMIN — PROPOFOL 30 MCG/KG/MIN: 10 INJECTION, EMULSION INTRAVENOUS at 22:51

## 2021-01-18 RX ADMIN — VANCOMYCIN HYDROCHLORIDE 1500 MG: 10 INJECTION, POWDER, LYOPHILIZED, FOR SOLUTION INTRAVENOUS at 04:12

## 2021-01-18 RX ADMIN — PROPOFOL 20 MCG/KG/MIN: 10 INJECTION, EMULSION INTRAVENOUS at 02:01

## 2021-01-18 RX ADMIN — PIPERACILLIN AND TAZOBACTAM 3.38 G: 3; .375 INJECTION, POWDER, LYOPHILIZED, FOR SOLUTION INTRAVENOUS at 13:42

## 2021-01-18 RX ADMIN — SUCCINYLCHOLINE CHLORIDE 100 MG: 20 INJECTION, SOLUTION INTRAMUSCULAR; INTRAVENOUS at 02:56

## 2021-01-18 RX ADMIN — PROPOFOL 30 MCG/KG/MIN: 10 INJECTION, EMULSION INTRAVENOUS at 11:27

## 2021-01-18 RX ADMIN — PROPOFOL 30 MCG/KG/MIN: 10 INJECTION, EMULSION INTRAVENOUS at 06:57

## 2021-01-18 RX ADMIN — MIDAZOLAM HYDROCHLORIDE 1 MG/HR: 5 INJECTION, SOLUTION INTRAMUSCULAR; INTRAVENOUS at 05:13

## 2021-01-18 RX ADMIN — NALOXONE HYDROCHLORIDE 1 MG: 1 INJECTION PARENTERAL at 01:20

## 2021-01-18 ASSESSMENT — PULMONARY FUNCTION TESTS
PIF_VALUE: 38
PIF_VALUE: 32
PIF_VALUE: 32
PIF_VALUE: 36
PIF_VALUE: 34
PIF_VALUE: 32
PIF_VALUE: 33
PIF_VALUE: 33
PIF_VALUE: 32
PIF_VALUE: 34
PIF_VALUE: 32
PIF_VALUE: 47
PIF_VALUE: 31
PIF_VALUE: 36

## 2021-01-18 ASSESSMENT — PAIN SCALES - GENERAL
PAINLEVEL_OUTOF10: 0

## 2021-01-18 NOTE — ED NOTES
Spoke with pts wife Jamilah Kaplan about pts code status. Per pts wife pt would want to be intubated and CPR. Dr. Tulio Calhoun notified.       Theo Johnson RN  01/17/21 [id number]

## 2021-01-18 NOTE — PLAN OF CARE
Problem: Falls - Risk of:  Goal: Will remain free from falls  Description: Will remain free from falls  Outcome: Ongoing  Goal: Absence of physical injury  Description: Absence of physical injury  Outcome: Ongoing     Problem: Skin Integrity:  Goal: Will show no infection signs and symptoms  Description: Will show no infection signs and symptoms  Outcome: Ongoing  Goal: Absence of new skin breakdown  Description: Absence of new skin breakdown  Outcome: Ongoing     Problem: Infection - Ventilator-Associated Pneumonia:  Goal: Prevent a ventilator associated event (VAE)  Description: Prevent a ventilator associated event (VAE)  Outcome: Ongoing  Goal: Absence of pulmonary infection  Description: Absence of pulmonary infection  Outcome: Ongoing     Problem: Discharge Planning:  Goal: Participates in care planning  Description: Participates in care planning  Outcome: Ongoing  Goal: Discharged to appropriate level of care  Description: Discharged to appropriate level of care  Outcome: Ongoing     Problem: Discharge Planning:  Goal: Participates in care planning  Description: Participates in care planning  Outcome: Ongoing  Goal: Discharged to appropriate level of care  Description: Discharged to appropriate level of care  Outcome: Ongoing     Problem: Airway Clearance - Ineffective:  Goal: Ability to maintain a clear airway will improve  Description: Ability to maintain a clear airway will improve  Outcome: Ongoing     Problem: Anxiety/Stress:  Goal: Level of anxiety will decrease  Description: Level of anxiety will decrease  Outcome: Ongoing     Problem: Aspiration:  Goal: Absence of aspiration  Description: Absence of aspiration  Outcome: Ongoing     Problem:  Bowel Function - Altered:  Goal: Bowel elimination is within specified parameters  Description: Bowel elimination is within specified parameters  Outcome: Ongoing     Problem: Cardiac Output - Decreased:  Goal: Hemodynamic stability will improve Description: Hemodynamic stability will improve  Outcome: Ongoing     Problem: Fluid Volume - Imbalance:  Goal: Absence of imbalanced fluid volume signs and symptoms  Description: Absence of imbalanced fluid volume signs and symptoms  Outcome: Ongoing     Problem: Gas Exchange - Impaired:  Goal: Levels of oxygenation will improve  Description: Levels of oxygenation will improve  Outcome: Ongoing     Problem: Mental Status - Impaired:  Goal: Mental status will be restored to baseline  Description: Mental status will be restored to baseline  Outcome: Ongoing     Problem: Nutrition Deficit:  Goal: Ability to achieve adequate nutritional intake will improve  Description: Ability to achieve adequate nutritional intake will improve  Outcome: Ongoing     Problem: Pain:  Description: Pain management should include both nonpharmacologic and pharmacologic interventions.   Goal: Pain level will decrease  Description: Pain level will decrease  Outcome: Ongoing  Goal: Recognizes and communicates pain  Description: Recognizes and communicates pain  Outcome: Ongoing  Goal: Control of acute pain  Description: Control of acute pain  Outcome: Ongoing  Goal: Control of chronic pain  Description: Control of chronic pain  Outcome: Ongoing     Problem: Serum Glucose Level - Abnormal:  Goal: Ability to maintain appropriate glucose levels will improve to within specified parameters  Description: Ability to maintain appropriate glucose levels will improve to within specified parameters  Outcome: Ongoing     Problem: Skin Integrity - Impaired:  Goal: Will show no infection signs and symptoms  Description: Will show no infection signs and symptoms  Outcome: Ongoing  Goal: Absence of new skin breakdown  Description: Absence of new skin breakdown  Outcome: Ongoing     Problem: Sleep Pattern Disturbance:  Goal: Appears well-rested  Description: Appears well-rested  Outcome: Ongoing     Problem: Tissue Perfusion, Altered: Goal: Circulatory function within specified parameters  Description: Circulatory function within specified parameters  Outcome: Ongoing     Problem: Tissue Perfusion - Cardiopulmonary, Altered:  Goal: Absence of angina  Description: Absence of angina  Outcome: Ongoing  Goal: Hemodynamic stability will improve  Description: Hemodynamic stability will improve  Outcome: Ongoing     Problem: Airway Clearance - Ineffective:  Goal: Ability to maintain a clear airway will improve  Description: Ability to maintain a clear airway will improve  Outcome: Ongoing

## 2021-01-18 NOTE — PROGRESS NOTES
Recent Labs     01/17/21  2242   BUN 65*       Recent Labs     01/17/21  2242   CREATININE 2.4*       Estimated Creatinine Clearance: 29 mL/min (A) (based on SCr of 2.4 mg/dL (H)).       Plan: Changed Pepcid 20mg Q12hr to 20mg Daily due to decline in renal function    Electronically signed by Paulino Collins [facility] on 1/18/2021 at 5:04 AM

## 2021-01-18 NOTE — ED PROVIDER NOTES
 Primary osteoarthritis of left knee 10/14/2018    PVD (peripheral vascular disease) ([address]          SURGICALHISTORY       Past Surgical History:   Procedure Laterality Date    BACK SURGERY      COLONOSCOPY  2007?  CYSTOSCOPY Bilateral 12/19/2018    CYSTOSCOPY, BIOSPY FULGURATION OF BLADDER TUMOR POSSIBLE TURBT, RETROGRADE PYELOGRAM performed by Kamila Georges MD at [facility] COLONOSCOPY FLX DX W/COLLJ SPEC WHEN PFRMD N/A 9/11/2017    Dr Sherif Dahl internal hemorrhoids, diverticular disease-HP-No recall (age)   [facility] [place] REVISE MEDIAN N/CARPAL TUNNEL SURG Left 7/18/2018    OPEN CARPAL TUNNEL RELEASE performed by Miguel Winkler MD at [address] Left 8/28/2018    LEFT CAROTID ENDARTERECTOMY WITH VEIN PATCH ANGIOPLASTY AND COMPLETION ANGIOGRAM performed by Sarah Parrish MD at [address] [place] [address] Left 10/15/2018    LEFT COMPLEX TOTAL KNEE ARTHROPLASTY performed by Miguel Winkler MD at [facility] VASCULAR SURGERY  04/21/2015    Ron BESS Ultrasound guided access of left common femoral artery. Aortogram.Diagnostic right lower extremity arteriogram.Radiologic supervision and interpretation.  VASCULAR SURGERY  01/13/2015    Ron Wong M.D Atherectomy,angioplasty,and stenting of left superficial femoral artery.  VASCULAR SURGERY  03/11/2014    Ron Wong M.D. Ultrasound-guided access of right common femoral artery. Aortogram.Left lower extremity arteriogram.Atherectomy and angioplasty of left superficial femoral artery. Radiologic supervision and interpretation.  VASCULAR SURGERY  01/18/2013    Ron BESS Aortogram.Multistation arteriogram right lower extremity. Laser atherectomy and angioplasty of right superficial femoral artery. Selective catheterization of right tibioperoneal trunk. Angioplasty of peroneal artery and tibioperoneal trunk.     VASCULAR SURGERY  10/30/2018 [facility].Ultrasound guided cannulation of right internal vein. Placement of right internal jugular vein tunneled dialysis catheter bard tian xk 23cm tip to cuff    VASCULAR SURGERY  12/17/2018    [facility]. Removal of tunneled dilaysis catheter right internal jugular vein.          CURRENT MEDICATIONS       Current Discharge Medication List      CONTINUE these medications which have NOT CHANGED    Details   aspirin 81 MG EC tablet Take 81 mg by mouth daily      ciprofloxacin (CIPRO) 500 MG tablet Take 500 mg by mouth 2 times daily Pt started taking on Monday 1/11/2021      mirabegron (MYRBETRIQ) 25 MG TB24 Take 1 tablet by mouth daily  Qty: 90 tablet, Refills: 3    Associated Diagnoses: BPH associated with nocturia      hydroCHLOROthiazide (HYDRODIURIL) 12.5 MG tablet TK 1 T PO D      losartan (COZAAR) 100 MG tablet TK 1 T PO D      tamsulosin (FLOMAX) 0.4 MG capsule TK 1 C PO QD  Qty: 90 capsule, Refills: 3    Associated Diagnoses: BPH associated with nocturia      glipiZIDE (GLUCOTROL XL) 10 MG extended release tablet 10 mg   Refills: 3      Lancets (ONETOUCH DELICA PLUS [id number]) MISC USE TO TEST BLOOD SUGAR ONCE QD  Refills: 4      ONETOUCH VERIO strip USE TO TEST BLOOD SUGAR ONCE QD  Refills: 4      atorvastatin (LIPITOR) 20 MG tablet Take 20 mg by mouth daily      pantoprazole (PROTONIX) 40 MG tablet Take 40 mg by mouth daily      metoprolol succinate (TOPROL XL) 100 MG extended release tablet Take 100 mg by mouth daily      NIFEdipine (PROCARDIA XL) 60 MG extended release tablet Take 60 mg by mouth daily             ALLERGIES     Eliquis [apixaban] and Promethazine hcl    FAMILY HISTORY       Family History   Problem Relation Age of Onset    Colon Cancer Father     Diabetes Brother     Colon Polyps Neg Hx     Liver Cancer Neg Hx     Liver Disease Neg Hx     Esophageal Cancer Neg Hx     Rectal Cancer Neg Hx     Stomach Cancer Neg Hx           SOCIAL HISTORY       Social History     Socioeconomic History  Marital status:      Spouse name: None    Number of children: None    Years of education: None    Highest education level: None   Occupational History    None   Social Needs    Financial resource strain: None    Food insecurity     Worry: None     Inability: None    Transportation needs     Medical: None     Non-medical: None   Tobacco Use    Smoking status: Former Smoker     Quit date: 6/3/2003     Years since quittin.6    Smokeless tobacco: Never Used   Substance and Sexual Activity    Alcohol use: Yes     Alcohol/week: 12.0 standard drinks     Types: 12 Glasses of wine per week     Comment: 2 glasses of wine every night    Drug use: No    Sexual activity: Yes     Partners: Female   Lifestyle    Physical activity     Days per week: None     Minutes per session: None    Stress: None   Relationships    Social connections     Talks on phone: None     Gets together: None     Attends Yazidism service: None     Active member of club or organization: None     Attends meetings of clubs or organizations: None     Relationship status: None    Intimate partner violence     Fear of current or ex partner: None     Emotionally abused: None     Physically abused: None     Forced sexual activity: None   Other Topics Concern    None   Social History Narrative    None       SCREENINGS    Dago Coma Scale  Eye Opening: Spontaneous  Best Verbal Response: Confused  Best Motor Response: Obeys commands  Dago Coma Scale Score: 14 @FLOW([id number])@      PHYSICAL EXAM    (up to 7 for level 4, 8 or more for level 5)     ED Triage Vitals   BP Temp Temp Source Pulse Resp SpO2 Height Weight   21 2235 21 2254 21 2254 215 21 22521 -- --   110/65 94.4 °F (34.7 °C) Rectal (!) 48 16 (!) 77 %         Physical Exam  Vitals signs and nursing note reviewed. Constitutional:       General: He is not in acute distress (on 4 liters O2). Appearance: He is obese. He is ill-appearing and toxic-appearing. Comments: Drowsy, but easily awaken   HENT:      Mouth/Throat:      Mouth: Mucous membranes are moist.      Pharynx: Oropharynx is clear. Eyes:      Pupils: Pupils are equal, round, and reactive to light. Neck:      Musculoskeletal: Normal range of motion and neck supple. Cardiovascular:      Rate and Rhythm: Regular rhythm. Bradycardia present. Heart sounds: Normal heart sounds. Pulmonary:      Effort: Pulmonary effort is normal.      Breath sounds: Normal breath sounds. Abdominal:      Palpations: Abdomen is soft. Tenderness: There is no abdominal tenderness. Musculoskeletal:         General: No swelling. Skin:     Comments: Cool, dry   Neurological:      GCS: GCS eye subscore is 3. GCS verbal subscore is 4. GCS motor subscore is 6. Cranial Nerves: No cranial nerve deficit or dysarthria.    Psychiatric:      Comments: drowsy         DIAGNOSTIC RESULTS     EKG: All EKG's are interpreted by the Emergency Department Physician who either signs or Co-signsthis chart in the absence of a cardiologist.    EKG: sinus, VR 52, IVCD, no injury    RADIOLOGY:   Non-plain filmimages such as CT, Ultrasound and MRI are read by the radiologist. Plain radiographic images are visualized and preliminarily interpreted by the emergency physician with the below findings:        Interpretation per the Radiologist below, if available at the time ofthis note:    XR CHEST PORTABLE    (Results Pending)   CT Head WO Contrast    (Results Pending)   XR CHEST PORTABLE    (Results Pending)   XR CHEST PORTABLE    (Results Pending)         ED BEDSIDE ULTRASOUND:   Performed by ED Physician - none    LABS:  Labs Reviewed   BLOOD GAS, ARTERIAL - Abnormal; Notable for the following components:       Result Value    pH, Arterial 7.260 (*)     pCO2, Arterial 79.0 (*)     pO2, Arterial 43.0 (*)     HCO3, Arterial 35.5 (*) Base Excess, Arterial 5.4 (*)     O2 Sat, Arterial 74.6 (*)     All other components within normal limits    Narrative:     CALL  [facility]. , dr Anna Smith, 01/17/2021 22:54, by Norah Long - Abnormal; Notable for the following components:    Pro-BNP 7,756 (*)     All other components within normal limits   CBC WITH AUTO DIFFERENTIAL - Abnormal; Notable for the following components:    WBC 11.7 (*)     MCV 95.6 (*)     MCHC 29.6 (*)     Neutrophils % 72.0 (*)     Lymphocytes % 8.0 (*)     Monocytes % 12.0 (*)     Lymphocytes Absolute 0.6 (*)     Metamyelocytes Relative 1 (*)     nRBC 100 (*)     Toxic Granulation 1+ (*)     All other components within normal limits   COMPREHENSIVE METABOLIC PANEL - Abnormal; Notable for the following components:    CO2 33 (*)     Glucose 41 (*)     BUN 65 (*)     CREATININE 2.4 (*)     GFR Non- 26 (*)     GFR  32 (*)     All other components within normal limits    Narrative:     CALL  [facility] tel. ,  Chemistry results called to and read back by Ivonne Singleton RN in ED, 01/17/2021 23:33,  by [id number] RT REFLEX TO CULTURE - Abnormal; Notable for the following components:    Protein,  (*)     Leukocyte Esterase, Urine SMALL (*)     All other components within normal limits   MICROSCOPIC URINALYSIS - Abnormal; Notable for the following components:    Bacteria, UA NEGATIVE (*)     Crystals, UA NEG (*)     All other components within normal limits   BLOOD GAS, ARTERIAL - Abnormal; Notable for the following components:    pH, Arterial 7.050 (*)     pCO2, Arterial 138.0 (*)     HCO3, Arterial 38.2 (*)     Base Excess, Arterial 2.7 (*)     All other components within normal limits    Narrative:     CALL  [facility] tel. , results given to Dr. Nadira Ngo MD  Panic results handed to, 01/18/2021 01:32, by [facility]  results called to Dr. Zion Mejia, 01/18/2021 01:27, by [facility] There was a high probability of clinically significant/lifethreatening deterioration in the patient's condition which required my urgent intervention. CONSULTS:  PHARMACY TO DOSE VANCOMYCIN    PROCEDURES:  Unless otherwise noted below, none     Intubation    Date/Time: 1/18/2021 2:00 AM  Performed by: Rian Qiu MD  Authorized by: Rian Qiu MD     Consent:     Consent obtained:  Emergent situation  Pre-procedure details:     Patient status:  Unresponsive    Mallampati score:  III    Pretreatment meds: etomidate. Paralytics:  Succinylcholine  Procedure details:     Preoxygenation:  Bag valve mask    CPR in progress: no      Intubation method:  Oral    Laryngoscope blade: Mac 4    Tube size (mm):  7.5    Tube type:  Cuffed    Number of attempts:  2    Ventilation between attempts: no      Cricoid pressure: no      Tube visualized through cords: yes    Placement assessment:     Tube secured with:  ETT ibrahim    Breath sounds:  Equal    Placement verification: chest rise, CXR verification, equal breath sounds and ETCO2 detector    Post-procedure details:     Patient tolerance of procedure: Tolerated well, no immediate complications        FINAL IMPRESSION      1. Acute respiratory failure with hypoxia and hypercapnia (HCC)    2. Pneumonitis    3. Hypoglycemia    4. Altered mental status, unspecified altered mental status type          DISPOSITION/PLAN   DISPOSITION Admitted 01/18/2021 02:31:35 AM      PATIENT REFERRED TO:  No follow-up provider specified.     DISCHARGE MEDICATIONS:  Current Discharge Medication List             (Please note that portions of this note were completed with a voice recognition program.  Efforts were made to edit the dictations but occasionally words are mis-transcribed.)    Rian Qiu MD (electronically signed)  Attending Emergency Physician          Rian Qiu MD  01/18/21 [id number]

## 2021-01-18 NOTE — PROGRESS NOTES
Results for John Starks (MRN [id number]) as of 1/18/2021 03:03   Ref.  Range 1/18/2021 01:25   Hemoglobin, Art, Extended Latest Ref Range: 14.0 - 18.0 g/dL 14.7   pH, Arterial Latest Ref Range: 7.350 - 7.450  7.050 (LL)   pCO2, Arterial Latest Ref Range: 35.0 - 45.0 mmHg 138.0 (HH)   pO2, Arterial Latest Ref Range: 80.0 - 100.0 mmHg 95.0   HCO3, Arterial Latest Ref Range: 22.0 - 26.0 mmol/L 38.2 (H)   Base Excess, Arterial Latest Ref Range: -2.0 - 2.0 mmol/L 2.7 (H)   O2 Sat, Arterial Latest Ref Range: >92 % 93.2   O2 Content, Arterial Latest Ref Range: Not Established mL/dL 19.3   Methemoglobin, Arterial Latest Ref Range: <1.5 % 0.6   Carboxyhgb, Arterial Latest Ref Range: 0.0 - 5.0 % 1.6       PT ON AC\,16,60%+5  RR +AT

## 2021-01-18 NOTE — ED NOTES
Contacted pts daughter and wife. Per pts step daughter pt went to his PCP's office on Monday 1/11/2021. Pt was placed on Cipro 500mg BID for an infection. Family states that pt is normally alert and oriented and able to answer questions himself. Family states that over the last 3 days pt has been lethargic, decreased appetite, and confused. Family states that pt told them today that he had been to [place] today and several other places. Family states that symptoms started after he started on the Cipro. Dr. Mars Morocho notified of pt symptoms.        Loren Cha RN  01/17/21 3530

## 2021-01-18 NOTE — PROGRESS NOTES
Mary Alice Hernandez arrived to room # 150 . Presented with: sever sepsis   Mental Status: Patient is disoriented and sedated on the vent. Vitals:    01/18/21 0400   BP: (!) 95/59   Pulse: (!) 42   Resp: 13   Temp:    SpO2: 99%     Patient safety contract and falls prevention contract reviewed with patient Yes. Oriented patient to room. Call light within reach. Yes.   Needs, issues or concerns expressed at this time: no.      Electronically signed by Mars Carlisle RN on 1/18/2021 at 4:26 AM

## 2021-01-18 NOTE — PROGRESS NOTES
Results for Katarnia Zacarias (MRN [id number]) as of 1/18/2021 03:34   Ref.  Range 1/18/2021 03:01   Hemoglobin, Art, Extended Latest Ref Range: 14.0 - 18.0 g/dL 15.0   pH, Arterial Latest Ref Range: 7.350 - 7.450  7.470 (H)   pCO2, Arterial Latest Ref Range: 35.0 - 45.0 mmHg 41.0   pO2, Arterial Latest Ref Range: 80.0 - 100.0 mmHg 105.0 (H)   HCO3, Arterial Latest Ref Range: 22.0 - 26.0 mmol/L 29.8 (H)   Base Excess, Arterial Latest Ref Range: -2.0 - 2.0 mmol/L 5.6 (H)   O2 Sat, Arterial Latest Ref Range: >92 % 95.9   O2 Content, Arterial Latest Ref Range: Not Established mL/dL 20.3   Methemoglobin, Arterial Latest Ref Range: <1.5 % 0.7   Carboxyhgb, Arterial Latest Ref Range: 0.0 - 5.0 % 1.5   Pt on vent VC,16,600,60% + 5 peep  Right radial puncture AT+

## 2021-01-18 NOTE — PROGRESS NOTES
4 Eyes Skin Assessment    Mary Alice Hernandez is being assessed upon: Admission    I agree that [facility], along with Dana Arroyo (either 2 RN's or 1 LPN and 1 RN) have performed a thorough Head to Toe Skin Assessment on the patient. ALL assessment sites listed below have been assessed. Areas assessed by both nurses:     [x]   Head, Face, and Ears   [x]   Shoulders, Back, and Chest  [x]   Arms, Elbows, and Hands   [x]   Coccyx, Sacrum, and Ischium  [x]   Legs, Feet, and Heels    Does the Patient have Skin Breakdown? Yes, wound(s) noted upon assessment. It is the responsibility of the Primary Nurse to assure that the following documentation, preventions, orders, and consults are complete on the above noted wound(s): Wound LDA initiated. LDA Flowsheet Documentation includes the Jeanna-wound, Wound Assessment, Measurements, Dressing Treatment, Drainage, and Color.     Ángel Prevention initiated: No  Wound Care Orders initiated: No    WOC nurse consulted for Pressure Injury (Stage 3,4, Unstageable, DTI, NWPT, and Complex wounds) and New or Established Ostomies: No        Primary Nurse eSignature: Mars Carlisle RN on 1/18/2021 at 4:28 AM      Co-Signer eSignature: Electronically signed by Dana Arroyo RN on 1/18/21 at 4:29 AM CST

## 2021-01-18 NOTE — PROGRESS NOTES
Spoke with Step Daughter Erica Diaz & Wife ROSI regarding Mr. Roxanna Valente. They request that Erica Diaz be notified first of any changes or information as the wife has early dementia and is not the best at relying messages. Contact information is in the chart.

## 2021-01-18 NOTE — H&P
HISTORY AND PHYSICAL             Date: 1/18/2021        Patient Name: Nola Luna     YOB: 1940      Age:  79 y.o. Chief Complaint     Chief Complaint   Patient presents with    Fatigue    Altered Mental Status     Evaluated for altered mentation  Hypothermia   Hypoglycemia   Respiratory and multiorgan failure. History Obtained From   Patient and er record. History of Present Illness   Brought in to er due to not feeling well  While in er developed respiratory distress and altered mentation and confusion and needed to be intubated and sedated and started on fluids and noted also to be hypothermic. Most likely etiology for this is sepsis   And was started on cipro recently for a urine infection   Also has an underlying dx of bladder cancer. Specifics of what precipitated the intense and quick decline in ER is not well defined. I can only hypothesize that is this is related to multiorgan failure, from sepsis and started with hypothermia. Past Medical History     Past Medical History:   Diagnosis Date    Acute liver failure without hepatic coma 10/23/2018    Back pain     \"with tired legs as a result\"    Bladder cancer ([facility] [address] 12/19/2018    Blood circulation, collateral     Carotid arterial disease (HCC)     recent surgery    CKD (chronic kidney disease), 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/2018    GERD (gastroesophageal reflux disease)     Hyperlipidemia     Hypertension     Hypertension     Palliative care patient 10/23/2018    Pneumonia due to infectious organism 11/6/2018    Primary osteoarthritis of left knee 10/14/2018    PVD (peripheral vascular disease) [facility])         Past Surgical History     Past Surgical History:   Procedure Laterality Date    BACK SURGERY      COLONOSCOPY  2007?     CYSTOSCOPY Bilateral 12/19/2018    CYSTOSCOPY, BIOSPY FULGURATION OF BLADDER TUMOR POSSIBLE TURBT, RETROGRADE PYELOGRAM performed by Abdirashid Trujillo MD at [address] [place] [address]  LA COLONOSCOPY FLX DX W/COLLJ SPEC WHEN PFRMD N/A 9/11/2017    Dr Kiara Escobar internal hemorrhoids, diverticular disease-HP-No recall (age)   [facility] LA REVISE MEDIAN N/CARPAL TUNNEL SURG Left 7/18/2018    OPEN CARPAL TUNNEL RELEASE performed by Cesar Wagoner MD at [address] Left 8/28/2018    LEFT CAROTID ENDARTERECTOMY WITH VEIN PATCH ANGIOPLASTY AND COMPLETION ANGIOGRAM performed by Mirtha Rosas MD at [place] [address] Left 10/15/2018    LEFT COMPLEX TOTAL KNEE ARTHROPLASTY performed by Cesar Wagoner MD at [facility] VASCULAR SURGERY  04/21/2015    Tariq BESS Ultrasound guided access of left common femoral artery. Aortogram.Diagnostic right lower extremity arteriogram.Radiologic supervision and interpretation.  VASCULAR SURGERY  01/13/2015    Tariq Potter M.D Atherectomy,angioplasty,and stenting of left superficial femoral artery.  VASCULAR SURGERY  03/11/2014    Tariq Potter M.D. Ultrasound-guided access of right common femoral artery. Aortogram.Left lower extremity arteriogram.Atherectomy and angioplasty of left superficial femoral artery. Radiologic supervision and interpretation.  VASCULAR SURGERY  01/18/2013    Tariq BESS Aortogram.Multistation arteriogram right lower extremity. Laser atherectomy and angioplasty of right superficial femoral artery. Selective catheterization of right tibioperoneal trunk. Angioplasty of peroneal artery and tibioperoneal trunk.  VASCULAR SURGERY  10/30/2018    [facility]. Ultrasound guided cannulation of right internal vein. Placement of right internal jugular vein tunneled dialysis catheter bard equistream xk 23cm tip to cuff    VASCULAR SURGERY  12/17/2018    [facility]. Removal of tunneled dilaysis catheter right internal jugular vein.         Medications Prior to Admission     Prior to Admission medications Medication Sig Start Date End Date Taking?  Authorizing Provider   aspirin 81 MG EC tablet Take 81 mg by mouth daily   Yes Historical Provider, MD   ciprofloxacin (CIPRO) 500 MG tablet Take 500 mg by mouth 2 times daily Pt started taking on Monday 1/11/2021   Yes Historical Provider, MD   mirabegron (MYRBETRIQ) 25 MG TB24 Take 1 tablet by mouth daily 12/21/20  Yes Franco Flower MD   hydroCHLOROthiazide (HYDRODIURIL) 12.5 MG tablet TK 1 T PO D 8/31/20  Yes Historical Provider, MD   losartan (COZAAR) 100 MG tablet TK 1 T PO D 8/20/20  Yes Historical Provider, MD   tamsulosin (FLOMAX) 0.4 MG capsule TK 1 C PO QD 2/27/20  Yes Franco Flower MD   glipiZIDE (GLUCOTROL XL) 10 MG extended release tablet 10 mg  8/2/19  Yes Historical Provider, MD   Lancets (ONETOUCH DELICA PLUS [id number]) MISC USE TO TEST BLOOD SUGAR ONCE QD 9/13/19  Yes Historical Provider, MD Gutierrez Salmeron strip USE TO TEST BLOOD SUGAR ONCE QD 9/13/19  Yes Historical Provider, MD   atorvastatin (LIPITOR) 20 MG tablet Take 20 mg by mouth daily   Yes Historical Provider, MD   pantoprazole (PROTONIX) 40 MG tablet Take 40 mg by mouth daily   Yes Historical Provider, MD   metoprolol succinate (TOPROL XL) 100 MG extended release tablet Take 100 mg by mouth daily   Yes Historical Provider, MD   NIFEdipine (PROCARDIA XL) 60 MG extended release tablet Take 60 mg by mouth daily   Yes Historical Provider, MD        Allergies   Eliquis [apixaban] and Promethazine hcl    Social History     Social History     Tobacco History     Smoking Status  Former Smoker Quit date  6/3/2003    Smokeless Tobacco Use  Never Used          Alcohol History     Alcohol Use Status  Yes Drinks/Week  12 Glasses of wine per week Amount  12.0 standard drinks of alcohol/wk Comment  2 glasses of wine every night          Drug Use     Drug Use Status  No          Sexual Activity     Sexually Active  Yes Partners  Female                Family History     Family History Problem Relation Age of Onset    Colon Cancer Father     Diabetes Brother     Colon Polyps Neg Hx     Liver Cancer Neg Hx     Liver Disease Neg Hx     Esophageal Cancer Neg Hx     Rectal Cancer Neg Hx     Stomach Cancer Neg Hx        Review of Systems   Review of Systems   Unable to perform ROS: Acuity of condition (patient is intubated and sedated )          Physical Exam   BP (!) 95/59   Pulse (!) 41   Temp 92.4 °F (33.6 °C) (Rectal)   Resp 13   Ht 5' 8\" (1.727 m)   Wt 228 lb 3.2 oz (103.5 kg)   SpO2 99%   BMI 34.70 kg/m²     Physical Exam  Constitutional:       General: He is in acute distress. Appearance: He is obese. He is ill-appearing, toxic-appearing and diaphoretic. HENT:      Head: Normocephalic. Mouth/Throat:      Mouth: Mucous membranes are moist.   Eyes:      Conjunctiva/sclera: Conjunctivae normal.   Neck:      Musculoskeletal: Normal range of motion. Cardiovascular:      Rate and Rhythm: Regular rhythm. Tachycardia present. Pulmonary:      Breath sounds: Wheezing present. Abdominal:      General: Abdomen is flat. Bowel sounds are normal.      Palpations: Abdomen is soft. Comments: Morbidly obese abdomen   Non distended, non tender. Musculoskeletal:      Right lower leg: Edema present. Left lower leg: Edema present. Skin:     General: Skin is warm.             Labs      Recent Results (from the past 24 hour(s))   Brain Natriuretic Peptide    Collection Time: 01/17/21 10:42 PM   Result Value Ref Range    Pro-BNP 7,756 (H) 0 - 1,800 pg/mL   CBC Auto Differential    Collection Time: 01/17/21 10:42 PM   Result Value Ref Range    WBC 11.7 (H) 4.8 - 10.8 K/uL    RBC 5.02 4.70 - 6.10 M/uL    Hemoglobin 14.2 14.0 - 18.0 g/dL    Hematocrit 48.0 42.0 - 52.0 %    MCV 95.6 (H) 80.0 - 94.0 fL    MCH 28.3 27.0 - 31.0 pg    MCHC 29.6 (L) 33.0 - 37.0 g/dL    RDW 13.3 11.5 - 14.5 %    Platelets 716 806 - 576 K/uL    MPV 10.9 9.4 - 12.4 fL PLATELET SLIDE REVIEW Adequate     Neutrophils % 72.0 (H) 50.0 - 65.0 %    Lymphocytes % 8.0 (L) 20.0 - 40.0 %    Monocytes % 12.0 (H) 0.0 - 10.0 %    Eosinophils % 4.0 0.0 - 5.0 %    Basophils % 1.0 0.0 - 1.0 %    Neutrophils Absolute 4.3 1.5 - 7.5 K/uL    Immature Granulocytes # 1.3 K/uL    Lymphocytes Absolute 0.6 (L) 1.1 - 4.5 K/uL    Monocytes Absolute 0.70 0.00 - 0.90 K/uL    Eosinophils Absolute 0.24 0.00 - 0.60 K/uL    Basophils Absolute 0.10 0.00 - 0.20 K/uL    Atypical Lymphocytes Relative 2 0 - 8 %    Metamyelocytes Relative 1 (A) %    nRBC 100 (A) /100 WBC    Corrected WBC 5.9 4.8 - 10.8 K/uL    Toxic Granulation 1+ (A)     RBC Morphology Normal    Comprehensive Metabolic Panel    Collection Time: 01/17/21 10:42 PM   Result Value Ref Range    Sodium 140 136 - 145 mmol/L    Potassium 4.1 3.5 - 5.0 mmol/L    Chloride 100 98 - 111 mmol/L    CO2 33 (H) 22 - 29 mmol/L    Anion Gap 7 7 - 19 mmol/L    Glucose 41 (LL) 74 - 109 mg/dL    BUN 65 (H) 8 - 23 mg/dL    CREATININE 2.4 (H) 0.5 - 1.2 mg/dL    GFR Non-African American 26 (A) >60    GFR  32 (L) >59    Calcium 8.9 8.8 - 10.2 mg/dL    Total Protein 6.7 6.6 - 8.7 g/dL    Alb 3.5 3.5 - 5.2 g/dL    Total Bilirubin 0.3 0.2 - 1.2 mg/dL    Alkaline Phosphatase 119 40 - 130 U/L    ALT 35 5 - 41 U/L    AST 29 5 - 40 U/L   BLOOD GAS, ARTERIAL    Collection Time: 01/17/21 10:50 PM   Result Value Ref Range    pH, Arterial 7.260 (LL) 7.350 - 7.450    pCO2, Arterial 79.0 (HH) 35.0 - 45.0 mmHg    pO2, Arterial 43.0 (LL) 80.0 - 100.0 mmHg    HCO3, Arterial 35.5 (H) 22.0 - 26.0 mmol/L    Base Excess, Arterial 5.4 (H) -2.0 - 2.0 mmol/L    Hemoglobin, Art, Extended 14.3 14.0 - 18.0 g/dL    O2 Sat, Arterial 74.6 (LL) >92 %    Carboxyhgb, Arterial 2.1 0.0 - 5.0 %    Methemoglobin, Arterial 0.5 <1.5 %    O2 Content, Arterial 15.0 Not Established mL/dL    O2 Therapy Unknown    Potassium, Whole Blood    Collection Time: 01/17/21 10:50 PM   Result Value Ref Range Potassium, Whole Blood 3.5    Urinalysis Reflex to Culture    Collection Time: 01/17/21 10:55 PM    Specimen: Urine, straight catheter   Result Value Ref Range    Color, UA YELLOW Straw/Yellow    Clarity, UA Clear Clear    Glucose, Ur Negative Negative mg/dL    Bilirubin Urine Negative Negative    Ketones, Urine Negative Negative mg/dL    Specific Gravity, UA 1.019 1.005 - 1.030    Blood, Urine Negative Negative    pH, UA 5.0 5.0 - 8.0    Protein,  (A) Negative mg/dL    Urobilinogen, Urine 1.0 <2.0 E.U./dL    Nitrite, Urine Negative Negative    Leukocyte Esterase, Urine SMALL (A) Negative   Microscopic Urinalysis    Collection Time: 01/17/21 10:55 PM   Result Value Ref Range    Bacteria, UA NEGATIVE (A) None Seen /HPF    Crystals, UA NEG (A) None Seen /HPF    Hyaline Casts, UA 4 0 - 8 /HPF    WBC, UA 5 0 - 5 /HPF    RBC, UA 2 0 - 4 /HPF    Epithelial Cells, UA 2 0 - 5 /HPF   Respiratory Panel, Molecular, with COVID-19 (Restricted: peds pts or suitable admitted adults)    Collection Time: 01/17/21 10:56 PM    Specimen: Nasopharyngeal   Result Value Ref Range    Adenovirus by PCR Not Detected Not Detected    Bordetella parapertussis by PCR Not Detected Not Detected    Bordetella pertussis by PCR Not Detected Not Detected    Chlamydophilia pneumoniae by PCR Not Detected Not Detected    Coronavirus 229E by PCR Not Detected Not Detected    Coronavirus HKU1 by PCR Not Detected Not Detected    Coronavirus NL63 by PCR Not Detected Not Detected    Coronavirus OC43 by PCR Not Detected Not Detected    SARS-CoV-2, PCR Not Detected Not Detected    Human Metapneumovirus by PCR Not Detected Not Detected    Human Rhinovirus/Enterovirus by PCR Not Detected Not Detected    Influenza A by PCR Not Detected Not Detected    Influenza B by PCR Not Detected Not Detected    Mycoplasma pneumoniae by PCR Not Detected Not Detected    Parainfluenza Virus 1 by PCR Not Detected Not Detected Imaging/Diagnostics Last 24 Hours   No results found. Assessment      Hospital Problems           Last Modified POA    * (Principal) Severe sepsis ([facility] [address] 1/18/2021 Yes    Essential hypertension 1/18/2021 Yes    Acute liver failure without hepatic coma 1/18/2021 Yes    CHF (congestive heart failure) ([address] 1/18/2021 Yes    Palliative care patient 1/18/2021 Yes    Acute renal failure ([address] 1/18/2021 Yes    BPH associated with nocturia 1/18/2021 Yes    Pneumonia due to infectious organism 1/18/2021 Yes    Bladder cancer ([address] 1/18/2021 Yes    History of bladder cancer 1/18/2021 Yes    Acute on chronic respiratory failure ([address] 1/18/2021 Yes    UTI (urinary tract infection) 1/18/2021 Yes    Acute on chronic kidney failure ([facility] [address] 1/18/2021 Yes    Hypoxemia 6/06/8359 Yes    Metabolic acidosis 0/56/3262 Yes    Acidosis, metabolic, with respiratory acidosis 1/18/2021 Yes          Plan   1. Being admitted to ICU due to multiorgan failure and metabolic and respiratory acidosis not compensated and required emergent intubation and etiology not known immediately     History is that he was started on cipro by pcp for infection ? uti vs prostatitis etc.     He had a reaction to this and was developing confusion and altered mentation. He could not give much history before he declined and overall worsened and required intubation  And sedation     Noted to be hypoglycemic  And hypothermic   With this history and history of bladder cancer and weakness and perhaps uti   It is thought that the sx he has are consistent with acute sepsis syndrome     Will send cultures   Iv antibioitcs  Broad spectrum   Fluids   Monitor mental status   Respiratory compromise and on ventilation     2. Hypoglycemia     3. Acute on chronic kidney failure. Fluids and monitor     4. Bladder cancer     5. Prostate issues, chronic,     6.  dvt and gi prophylaxis. Total of 35 minutes of critical care time spent with the patient. Consultations Ordered:  PHARMACY TO DOSE VANCOMYCIN    Electronically signed by Zoey Garcia MD on 1/18/21 at 2:37 AM CST

## 2021-01-18 NOTE — PROGRESS NOTES
Results for Cecy Jurado (MRN [id number]) as of 1/18/2021 01:27   Ref.  Range 1/18/2021 01:25   Hemoglobin, Art, Extended Latest Ref Range: 14.0 - 18.0 g/dL 14.7   pH, Arterial Latest Ref Range: 7.350 - 7.450  7.050 (LL)   pCO2, Arterial Latest Ref Range: 35.0 - 45.0 mmHg 138.0 (HH)   pO2, Arterial Latest Ref Range: 80.0 - 100.0 mmHg 95.0   HCO3, Arterial Latest Ref Range: 22.0 - 26.0 mmol/L 38.2 (H)   Base Excess, Arterial Latest Ref Range: -2.0 - 2.0 mmol/L 2.7 (H)   O2 Sat, Arterial Latest Ref Range: >92 % 93.2   O2 Content, Arterial Latest Ref Range: Not Established mL/dL 19.3   Methemoglobin, Arterial Latest Ref Range: <1.5 % 0.6   Carboxyhgb, Arterial Latest Ref Range: 0.0 - 5.0 % 1.6   Patient on 4 lpm NC  RR 22  Right Radial  +AT

## 2021-01-19 LAB
ALBUMIN SERPL-MCNC: 2.8 G/DL (ref 3.5–5.2)
ALP BLD-CCNC: 82 U/L (ref 40–130)
ALT SERPL-CCNC: 18 U/L (ref 5–41)
ANION GAP SERPL CALCULATED.3IONS-SCNC: 11 MMOL/L (ref 7–19)
AST SERPL-CCNC: 14 U/L (ref 5–40)
BASE EXCESS ARTERIAL: 3.1 MMOL/L (ref -2–2)
BASOPHILS ABSOLUTE: 0.1 K/UL (ref 0–0.2)
BASOPHILS RELATIVE PERCENT: 0.9 % (ref 0–1)
BILIRUB SERPL-MCNC: 0.6 MG/DL (ref 0.2–1.2)
BUN BLDV-MCNC: 55 MG/DL (ref 8–23)
CALCIUM SERPL-MCNC: 7.8 MG/DL (ref 8.8–10.2)
CARBOXYHEMOGLOBIN ARTERIAL: 1.2 % (ref 0–5)
CHLORIDE BLD-SCNC: 104 MMOL/L (ref 98–111)
CO2: 25 MMOL/L (ref 22–29)
CREAT SERPL-MCNC: 2.1 MG/DL (ref 0.5–1.2)
CREATININE URINE: 142.8 MG/DL (ref 4.2–622)
EOSINOPHILS ABSOLUTE: 0 K/UL (ref 0–0.6)
EOSINOPHILS RELATIVE PERCENT: 0.4 % (ref 0–5)
GFR AFRICAN AMERICAN: 37
GFR NON-AFRICAN AMERICAN: 31
GLUCOSE BLD-MCNC: 104 MG/DL (ref 70–99)
GLUCOSE BLD-MCNC: 108 MG/DL (ref 70–99)
GLUCOSE BLD-MCNC: 110 MG/DL (ref 70–99)
GLUCOSE BLD-MCNC: 112 MG/DL (ref 74–109)
GLUCOSE BLD-MCNC: 131 MG/DL (ref 70–99)
GLUCOSE BLD-MCNC: 136 MG/DL (ref 70–99)
GLUCOSE BLD-MCNC: 82 MG/DL (ref 70–99)
GLUCOSE BLD-MCNC: 98 MG/DL (ref 70–99)
HCO3 ARTERIAL: 27.9 MMOL/L (ref 22–26)
HCT VFR BLD CALC: 40.2 % (ref 42–52)
HEMOGLOBIN, ART, EXTENDED: 14.8 G/DL (ref 14–18)
HEMOGLOBIN: 12.9 G/DL (ref 14–18)
IMMATURE GRANULOCYTES #: 0.8 K/UL
LACTIC ACID: 1.3 MMOL/L (ref 0.5–1.9)
LYMPHOCYTES ABSOLUTE: 1.4 K/UL (ref 1.1–4.5)
LYMPHOCYTES RELATIVE PERCENT: 14.8 % (ref 20–40)
MAGNESIUM: 2 MG/DL (ref 1.6–2.4)
MCH RBC QN AUTO: 28.4 PG (ref 27–31)
MCHC RBC AUTO-ENTMCNC: 32.1 G/DL (ref 33–37)
MCV RBC AUTO: 88.5 FL (ref 80–94)
METHEMOGLOBIN ARTERIAL: 0.8 %
MONOCYTES ABSOLUTE: 0.6 K/UL (ref 0–0.9)
MONOCYTES RELATIVE PERCENT: 6.8 % (ref 0–10)
NEUTROPHILS ABSOLUTE: 6.4 K/UL (ref 1.5–7.5)
NEUTROPHILS RELATIVE PERCENT: 69 % (ref 50–65)
O2 CONTENT ARTERIAL: 19.1 ML/DL
O2 SAT, ARTERIAL: 91.9 %
O2 THERAPY: ABNORMAL
PCO2 ARTERIAL: 42 MMHG (ref 35–45)
PDW BLD-RTO: 13.3 % (ref 11.5–14.5)
PERFORMED ON: ABNORMAL
PERFORMED ON: NORMAL
PERFORMED ON: NORMAL
PH ARTERIAL: 7.43 (ref 7.35–7.45)
PLATELET # BLD: 171 K/UL (ref 130–400)
PMV BLD AUTO: 10.9 FL (ref 9.4–12.4)
PO2 ARTERIAL: 66 MMHG (ref 80–100)
POTASSIUM REFLEX MAGNESIUM: 3 MMOL/L (ref 3.5–5)
POTASSIUM, WHOLE BLOOD: 3.4
PROTEIN PROTEIN: 65 MG/DL (ref 15–45)
RBC # BLD: 4.54 M/UL (ref 4.7–6.1)
SODIUM BLD-SCNC: 140 MMOL/L (ref 136–145)
SODIUM URINE: 32 MMOL/L
TOTAL PROTEIN: 4.9 G/DL (ref 6.6–8.7)
VANCOMYCIN TROUGH: 9.2 UG/ML (ref 10–20)
WBC # BLD: 9.3 K/UL (ref 4.8–10.8)

## 2021-01-19 PROCEDURE — 36415 COLL VENOUS BLD VENIPUNCTURE: CPT

## 2021-01-19 PROCEDURE — 36600 WITHDRAWAL OF ARTERIAL BLOOD: CPT

## 2021-01-19 PROCEDURE — 80053 COMPREHEN METABOLIC PANEL: CPT

## 2021-01-19 PROCEDURE — 6360000002 HC RX W HCPCS: Performed by: INTERNAL MEDICINE

## 2021-01-19 PROCEDURE — 85025 COMPLETE CBC W/AUTO DIFF WBC: CPT

## 2021-01-19 PROCEDURE — 84300 ASSAY OF URINE SODIUM: CPT

## 2021-01-19 PROCEDURE — 94002 VENT MGMT INPAT INIT DAY: CPT

## 2021-01-19 PROCEDURE — 84132 ASSAY OF SERUM POTASSIUM: CPT

## 2021-01-19 PROCEDURE — 2580000003 HC RX 258: Performed by: INTERNAL MEDICINE

## 2021-01-19 PROCEDURE — 94003 VENT MGMT INPAT SUBQ DAY: CPT

## 2021-01-19 PROCEDURE — 83605 ASSAY OF LACTIC ACID: CPT

## 2021-01-19 PROCEDURE — 2500000003 HC RX 250 WO HCPCS: Performed by: INTERNAL MEDICINE

## 2021-01-19 PROCEDURE — 80202 ASSAY OF VANCOMYCIN: CPT

## 2021-01-19 PROCEDURE — 84156 ASSAY OF PROTEIN URINE: CPT

## 2021-01-19 PROCEDURE — 82947 ASSAY GLUCOSE BLOOD QUANT: CPT

## 2021-01-19 PROCEDURE — 2700000000 HC OXYGEN THERAPY PER DAY

## 2021-01-19 PROCEDURE — 2000000000 HC ICU R&B

## 2021-01-19 PROCEDURE — 82803 BLOOD GASES ANY COMBINATION: CPT

## 2021-01-19 PROCEDURE — 82570 ASSAY OF URINE CREATININE: CPT

## 2021-01-19 PROCEDURE — 83735 ASSAY OF MAGNESIUM: CPT

## 2021-01-19 RX ADMIN — PIPERACILLIN AND TAZOBACTAM 3.38 G: 3; .375 INJECTION, POWDER, LYOPHILIZED, FOR SOLUTION INTRAVENOUS at 14:03

## 2021-01-19 RX ADMIN — ENOXAPARIN SODIUM 40 MG: 40 INJECTION SUBCUTANEOUS at 08:07

## 2021-01-19 RX ADMIN — SODIUM CHLORIDE, PRESERVATIVE FREE 10 ML: 5 INJECTION INTRAVENOUS at 19:52

## 2021-01-19 RX ADMIN — FAMOTIDINE 20 MG: 10 INJECTION, SOLUTION INTRAVENOUS at 08:07

## 2021-01-19 RX ADMIN — PIPERACILLIN AND TAZOBACTAM 3.38 G: 3; .375 INJECTION, POWDER, LYOPHILIZED, FOR SOLUTION INTRAVENOUS at 06:27

## 2021-01-19 RX ADMIN — PIPERACILLIN AND TAZOBACTAM 3.38 G: 3; .375 INJECTION, POWDER, LYOPHILIZED, FOR SOLUTION INTRAVENOUS at 21:56

## 2021-01-19 RX ADMIN — POTASSIUM CHLORIDE 10 MEQ: 7.46 INJECTION, SOLUTION INTRAVENOUS at 06:27

## 2021-01-19 RX ADMIN — PROPOFOL 30 MCG/KG/MIN: 10 INJECTION, EMULSION INTRAVENOUS at 08:07

## 2021-01-19 RX ADMIN — POTASSIUM CHLORIDE 10 MEQ: 7.46 INJECTION, SOLUTION INTRAVENOUS at 04:37

## 2021-01-19 RX ADMIN — POTASSIUM CHLORIDE 10 MEQ: 7.46 INJECTION, SOLUTION INTRAVENOUS at 05:39

## 2021-01-19 RX ADMIN — SODIUM CHLORIDE, PRESERVATIVE FREE 10 ML: 5 INJECTION INTRAVENOUS at 08:29

## 2021-01-19 RX ADMIN — PROPOFOL 30 MCG/KG/MIN: 10 INJECTION, EMULSION INTRAVENOUS at 01:28

## 2021-01-19 RX ADMIN — POTASSIUM CHLORIDE 10 MEQ: 7.46 INJECTION, SOLUTION INTRAVENOUS at 03:26

## 2021-01-19 RX ADMIN — VANCOMYCIN HYDROCHLORIDE 1250 MG: 10 INJECTION, POWDER, LYOPHILIZED, FOR SOLUTION INTRAVENOUS at 04:37

## 2021-01-19 RX ADMIN — POTASSIUM CHLORIDE 10 MEQ: 7.46 INJECTION, SOLUTION INTRAVENOUS at 07:53

## 2021-01-19 RX ADMIN — SODIUM CHLORIDE: 9 INJECTION, SOLUTION INTRAVENOUS at 08:07

## 2021-01-19 RX ADMIN — POTASSIUM CHLORIDE 10 MEQ: 7.46 INJECTION, SOLUTION INTRAVENOUS at 02:22

## 2021-01-19 ASSESSMENT — PULMONARY FUNCTION TESTS
PIF_VALUE: 37
PIF_VALUE: 37
PIF_VALUE: 33
PIF_VALUE: 33
PIF_VALUE: 41
PIF_VALUE: 11
PIF_VALUE: 35

## 2021-01-19 ASSESSMENT — ENCOUNTER SYMPTOMS
NAUSEA: 0
COLOR CHANGE: 0
SHORTNESS OF BREATH: 0
VOICE CHANGE: 0
VOMITING: 0
DIARRHEA: 0
BACK PAIN: 0
CONSTIPATION: 0

## 2021-01-19 ASSESSMENT — PAIN SCALES - GENERAL
PAINLEVEL_OUTOF10: 0

## 2021-01-19 NOTE — PROGRESS NOTES
Results for Leatha Garcia (MRN [id number]) as of 1/19/2021 13:14   Ref.  Range 1/19/2021 13:13   Hemoglobin, Art, Extended Latest Ref Range: 14.0 - 18.0 g/dL 14.8   pH, Arterial Latest Ref Range: 7.350 - 7.450  7.430   pCO2, Arterial Latest Ref Range: 35.0 - 45.0 mmHg 42.0   pO2, Arterial Latest Ref Range: 80.0 - 100.0 mmHg 66.0 (L)   HCO3, Arterial Latest Ref Range: 22.0 - 26.0 mmol/L 27.9 (H)   Base Excess, Arterial Latest Ref Range: -2.0 - 2.0 mmol/L 3.1 (H)   O2 Sat, Arterial Latest Ref Range: >92 % 91.9   O2 Content, Arterial Latest Ref Range: Not Established mL/dL 19.1   Methemoglobin, Arterial Latest Ref Range: <1.5 % 0.8   Carboxyhgb, Arterial Latest Ref Range: 0.0 - 5.0 % 1.2   CPAP +5 30%  ABG at RR +at

## 2021-01-19 NOTE — PROGRESS NOTES
 magnesium sulfate 2 g in 50 mL IVPB premix  2 g Intravenous PRN Darell Montenegro MD        ondansetron (ZOFRAN) injection 4 mg  4 mg Intravenous Q6H PRN Darell Montenegro MD        piperacillin-tazobactam (ZOSYN) 3.375 g in dextrose 5 % 50 mL IVPB extended infusion (mini-bag)  3.375 g Intravenous Q8H Darell Montenegro MD 12.5 mL/hr at 01/19/21 1403 3.375 g at 01/19/21 1403    insulin lispro (HUMALOG) injection vial 0-12 Units  0-12 Units Subcutaneous TID WC Darell Montenegro MD        insulin lispro (HUMALOG) injection vial 0-6 Units  0-6 Units Subcutaneous Nightly Darell Montenegro MD        vancomycin (VANCOCIN) intermittent dosing (placeholder)   Other RX Placeholder Darell Montenegro MD        famotidine (PEPCID) injection 20 mg  20 mg Intravenous Daily Darell Montenegro MD   20 mg at 01/19/21 3922    propofol injection  10 mcg/kg/min Intravenous Titrated Darell Montenegro MD   Stopped at 01/19/21 1153    midazolam (VERSED) 100 mg in dextrose 5 % 100 mL infusion  1 mg/hr Intravenous Continuous Darell Montenegro MD   Stopped at 01/19/21 1153    0.9 % sodium chloride infusion   Intravenous Continuous Birda Organ,  mL/hr at 01/19/21 1000 Rate Verify at 01/19/21 1000    dextrose 50 % IV solution  25 g Intravenous PRN Birda Organ, DO   25 g at 01/18/21 2251       Past Medical History:  Past Medical History:   Diagnosis Date    Acute liver failure without hepatic coma 10/23/2018    Back pain     \"with tired legs as a result\"    Bladder cancer ([address] 12/19/2018    Blood circulation, collateral     Carotid arterial disease ([address]     recent surgery    CKD (chronic kidney disease), 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/2018    GERD (gastroesophageal reflux disease)     Hyperlipidemia     Hypertension     Hypertension     Palliative care patient 10/23/2018    Pneumonia due to infectious organism 11/6/2018  Primary osteoarthritis of left knee 10/14/2018    PVD (peripheral vascular disease) [facility])        Past Surgical History:  Past Surgical History:   Procedure Laterality Date    BACK SURGERY      COLONOSCOPY  2007?  CYSTOSCOPY Bilateral 12/19/2018    CYSTOSCOPY, BIOSPY FULGURATION OF BLADDER TUMOR POSSIBLE TURBT, RETROGRADE PYELOGRAM performed by Jolanta Rae MD at [facility] COLONOSCOPY FLX DX W/COLLJ SPEC WHEN PFRMD N/A 9/11/2017    Dr Jones Fuelling internal hemorrhoids, diverticular disease-HP-No recall (age)   [facility] [place] REVISE MEDIAN N/CARPAL TUNNEL SURG Left 7/18/2018    OPEN CARPAL TUNNEL RELEASE performed by Nate Rahman MD at [address] Left 8/28/2018    LEFT CAROTID ENDARTERECTOMY WITH VEIN PATCH ANGIOPLASTY AND COMPLETION ANGIOGRAM performed by Louis Harmon MD at [place] [address] Left 10/15/2018    LEFT COMPLEX TOTAL KNEE ARTHROPLASTY performed by Nate Rahman MD at [facility] VASCULAR SURGERY  04/21/2015    Benedicto BESS Ultrasound guided access of left common femoral artery. Aortogram.Diagnostic right lower extremity arteriogram.Radiologic supervision and interpretation.  VASCULAR SURGERY  01/13/2015    Benedicto Hernandez M.D Atherectomy,angioplasty,and stenting of left superficial femoral artery.  VASCULAR SURGERY  03/11/2014    Benedicto Hernandez M.D. Ultrasound-guided access of right common femoral artery. Aortogram.Left lower extremity arteriogram.Atherectomy and angioplasty of left superficial femoral artery. Radiologic supervision and interpretation.  VASCULAR SURGERY  01/18/2013    Benedicto BESS Aortogram.Multistation arteriogram right lower extremity. Laser atherectomy and angioplasty of right superficial femoral artery. Selective catheterization of right tibioperoneal trunk. Angioplasty of peroneal artery and tibioperoneal trunk.     VASCULAR SURGERY  10/30/2018 Physically abused: Not on file     Forced sexual activity: Not on file   Other Topics Concern    Not on file   Social History Narrative    Not on file         Review of Systems:    Review of Systems   Constitutional: Negative for activity change and fever. HENT: Negative for congestion and voice change. Eyes: Negative for visual disturbance. Respiratory: Negative for shortness of breath. Cardiovascular: Negative for chest pain and leg swelling. Gastrointestinal: Negative for constipation, diarrhea, nausea and vomiting. Endocrine: Negative for polyuria. Genitourinary: Negative for difficulty urinating and dysuria. Musculoskeletal: Negative for back pain and neck pain. Skin: Negative for color change. Allergic/Immunologic: Negative for immunocompromised state. Neurological: Negative for dizziness and light-headedness. Psychiatric/Behavioral: The patient is not nervous/anxious. Objective:  Blood pressure 92/62, pulse 69, temperature 97.2 °F (36.2 °C), temperature source Temporal, resp. rate (!) 31, height 5' 8\" (1.727 m), weight 240 lb (108.9 kg), SpO2 94 %. Intake/Output Summary (Last 24 hours) at 1/19/2021 1408  Last data filed at 1/19/2021 1000  Gross per 24 hour   Intake 4284.37 ml   Output 710 ml   Net 3574.37 ml       Physical Exam  Vitals signs and nursing note reviewed. HENT:      Head: Normocephalic and atraumatic. Right Ear: External ear normal.      Left Ear: External ear normal.      Nose: Nose normal.      Mouth/Throat:      Mouth: Mucous membranes are moist.   Eyes:      Conjunctiva/sclera: Conjunctivae normal.      Pupils: Pupils are equal, round, and reactive to light. Neck:      Musculoskeletal: Neck supple. No neck rigidity. Cardiovascular:      Rate and Rhythm: Normal rate and regular rhythm. Heart sounds: Normal heart sounds. Pulmonary:      Effort: Pulmonary effort is normal.      Breath sounds: Normal breath sounds.    Abdominal: No results for input(s): CULTURE in the last 72 hours. Recent Labs     01/17/21  2316 01/18/21  0357   BC  --  No Growth to date. Any change in status will be called. BLOODCULT2 No Growth to date. Any change in status will be called. --      No results for input(s): CXSURG in the last 72 hours. Radiology reports as per the Radiologist  Radiology: Ct Head Wo Contrast    Result Date: 1/18/2021  Examination. CT HEAD WO CONTRAST 1/17/2021 10:14 PM History: Altered mental status. DLP: 886 mGycm. The CT scan of the head is performed without intravenous contrast enhancement. The images are acquired in axial plane with subsequent reconstruction in coronal and sagittal planes. The comparison is made with the previous study dated 8/15/2018. There is no evidence of a mass or midline shift. There is no evidence of intracranial hemorrhage or hematoma. Moderately prominent ventricles, basal cistern and the cortical sulci are age-appropriate. There are scattered areas of chronic white matter ischemia in the centrum semiovale bilaterally, similar to the previous study. The gray-white matter differentiation is maintained. A partially empty sella turcica is seen. The images reviewed in bone window show no acute bony abnormality. Visualized paranasal sinuses and mastoid air cells. No acute intracranial abnormality. The above study was initially reviewed and reported by [facility]. I do not find any discrepancies. Signed by Dr Mike Ortiz on 1/18/2021 6:47 AM    Xr Chest Portable    Result Date: 1/18/2021  EXAMINATION:  XR CHEST PORTABLE  1/18/2021 7:14 AM HISTORY: NG tube placement. Shortness of air. COMPARISON: 1/18/2021 at 0139 hours. FINDINGS:  There is hypoventilation. The patient is intubated. An NG tube extends to the stomach. Bilateral infiltrates are unchanged.  Heart size upper limits of normal. 1. New NG tube in good position. Patient remains intubated. 2. Bilateral infiltrates without significant change. Signed by Dr Tao Melton on 1/18/2021 7:16 AM    Xr Chest Portable    Result Date: 1/18/2021  EXAMINATION:  XR CHEST PORTABLE  1/18/2021 7:13 AM HISTORY: Shortness of breath. COMPARISON: 7/31/2020. FINDINGS:  There is hypoventilation. There is perihilar infiltrate. There is more dense infiltrate at the right lung base. No significant pleural effusion is seen. Heart size is upper limits of normal. The thoracic aorta is atheromatous. 1. Hypoventilation. 2. Bilateral infiltrates are likely infectious/inflammatory. Signed by Dr Tao Melton on 1/18/2021 7:14 AM    Xr Chest Portable    Result Date: 1/18/2021  EXAMINATION:  XR CHEST PORTABLE  1/18/2021 7:12 AM HISTORY: Patient intubated. Bilateral infiltrates. COMPARISON: 1/17/2021 at 2301 hours. FINDINGS:  The patient is now intubated with endotracheal tube tip at the T4 level. There is hypoventilation. There are perihilar opacities with more dense consolidation at the right lung base. Heart size is upper limits of normal. No acute bony abnormality is seen. 1. Endotracheal tube tip at the T4 level. 2. Bilateral infiltrates without significant change. Signed by Dr Tao Melton on 1/18/2021 7:13 AM       Assessment     Sepsis. Presumably a urinary source. Continue antibiotics. Acute renal failure. Continue fluid resuscitation. Acute hypoxemic respiratory failure. Resolved. Liberated from the ventilator. Please document 42 minutes of critical care time for patient assessment, chart review, discussion with staff, .       Evi Lauren DO

## 2021-01-19 NOTE — PLAN OF CARE
Problem: Falls - Risk of:  Goal: Will remain free from falls  Description: Will remain free from falls  1/19/2021 0903 by Jovita Noel RN  Outcome: Ongoing  1/19/2021 0044 by Clemencia Chairez RN  Outcome: Ongoing  Goal: Absence of physical injury  Description: Absence of physical injury  1/19/2021 0903 by Jovita Noel RN  Outcome: Ongoing  1/19/2021 0044 by Clemencia Chairez RN  Outcome: Ongoing     Problem: Skin Integrity:  Goal: Will show no infection signs and symptoms  Description: Will show no infection signs and symptoms  1/19/2021 0903 by Jovita Noel RN  Outcome: Ongoing  1/19/2021 0044 by Clemencia Chairez RN  Outcome: Ongoing  Goal: Absence of new skin breakdown  Description: Absence of new skin breakdown  1/19/2021 0903 by Jovita Noel RN  Outcome: Ongoing  1/19/2021 0044 by Clemencia Chairez RN  Outcome: Ongoing     Problem: Infection - Ventilator-Associated Pneumonia:  Goal: Prevent a ventilator associated event (VAE)  Description: Prevent a ventilator associated event (VAE)  1/19/2021 0903 by Jovita Noel RN  Outcome: Ongoing  1/19/2021 0044 by Clemencia Chairez RN  Outcome: Ongoing  Goal: Absence of pulmonary infection  Description: Absence of pulmonary infection  1/19/2021 0903 by Jovita Noel RN  Outcome: Ongoing  1/19/2021 0044 by Clemencia Chairez RN  Outcome: Ongoing     Problem: Discharge Planning:  Goal: Participates in care planning  Description: Participates in care planning  1/19/2021 0903 by Jovita Noel RN  Outcome: Ongoing  1/19/2021 0044 by Clemencia Chairez RN  Outcome: Ongoing  Goal: Discharged to appropriate level of care  Description: Discharged to appropriate level of care  1/19/2021 0903 by Jovita Noel RN  Outcome: Ongoing  1/19/2021 0044 by Clemencia Chairez RN  Outcome: Ongoing     Problem: Airway Clearance - Ineffective:  Goal: Ability to maintain a clear airway will improve  Description: Ability to maintain a clear airway will improve  1/19/2021 0903 by Jovita Noel RN  Outcome: Ongoing 1/19/2021 0044 by Iona Elizabeth RN  Outcome: Ongoing     Problem: Anxiety/Stress:  Goal: Level of anxiety will decrease  Description: Level of anxiety will decrease  1/19/2021 0903 by Violetta Tinajero RN  Outcome: Ongoing  1/19/2021 0044 by Iona Elizabeth RN  Outcome: Ongoing     Problem: Aspiration:  Goal: Absence of aspiration  Description: Absence of aspiration  1/19/2021 0903 by Violetta Tinajero RN  Outcome: Ongoing  1/19/2021 0044 by Iona Elizabeth RN  Outcome: Ongoing     Problem: Nutrition Deficit:  Goal: Ability to achieve adequate nutritional intake will improve  Description: Ability to achieve adequate nutritional intake will improve  1/19/2021 0903 by Violetta Tinajero RN  Outcome: Ongoing  1/19/2021 0044 by Iona Elizabeth RN  Outcome: Ongoing     Problem: Pain:  Goal: Pain level will decrease  Description: Pain level will decrease  1/19/2021 0903 by Violetta Tinajero RN  Outcome: Ongoing  1/19/2021 0044 by Iona Elizabeth RN  Outcome: Ongoing  Goal: Recognizes and communicates pain  Description: Recognizes and communicates pain  1/19/2021 0903 by Violetta Tinajero RN  Outcome: Ongoing  1/19/2021 0044 by Iona Elizabeth RN  Outcome: Ongoing  Goal: Control of acute pain  Description: Control of acute pain  1/19/2021 0903 by Violetta Tinajero RN  Outcome: Ongoing  1/19/2021 0044 by Iona Elizabeth RN  Outcome: Ongoing  Goal: Control of chronic pain  Description: Control of chronic pain  1/19/2021 0903 by Violetta Tinajero RN  Outcome: Ongoing  1/19/2021 0044 by Iona Elizabeth RN  Outcome: Ongoing     Problem: Sleep Pattern Disturbance:  Goal: Appears well-rested  Description: Appears well-rested  1/19/2021 0903 by Violetta Tinajero RN  Outcome: Ongoing  1/19/2021 0044 by Iona Elizabeth RN  Outcome: Ongoing     Problem: Tissue Perfusion, Altered:  Goal: Circulatory function within specified parameters  Description: Circulatory function within specified parameters  1/19/2021 0903 by Violetta Tinajero RN  Outcome: Ongoing 1/19/2021 0044 by Noe Izaguirre RN  Outcome: Ongoing     Problem: Tissue Perfusion - Cardiopulmonary, Altered:  Goal: Absence of angina  Description: Absence of angina  1/19/2021 0903 by Isacc Hahn RN  Outcome: Ongoing  1/19/2021 0044 by Noe Izaguirre RN  Outcome: Ongoing  Goal: Hemodynamic stability will improve  Description: Hemodynamic stability will improve  1/19/2021 0903 by Isacc Hahn RN  Outcome: Ongoing  1/19/2021 0044 by Noe Izaguirre RN  Outcome: Ongoing     Problem: Airway Clearance - Ineffective:  Goal: Ability to maintain a clear airway will improve  Description: Ability to maintain a clear airway will improve  1/19/2021 0903 by Isacc Hahn RN  Outcome: Ongoing  1/19/2021 0044 by Noe Izaguirre RN  Outcome: Ongoing

## 2021-01-19 NOTE — PLAN OF CARE
Problem: Falls - Risk of:  Goal: Will remain free from falls  Description: Will remain free from falls  Outcome: Ongoing  Goal: Absence of physical injury  Description: Absence of physical injury  Outcome: Ongoing     Problem: Skin Integrity:  Goal: Will show no infection signs and symptoms  Description: Will show no infection signs and symptoms  Outcome: Ongoing  Goal: Absence of new skin breakdown  Description: Absence of new skin breakdown  Outcome: Ongoing     Problem: Infection - Ventilator-Associated Pneumonia:  Goal: Prevent a ventilator associated event (VAE)  Description: Prevent a ventilator associated event (VAE)  Outcome: Ongoing  Goal: Absence of pulmonary infection  Description: Absence of pulmonary infection  Outcome: Ongoing     Problem: Discharge Planning:  Goal: Participates in care planning  Description: Participates in care planning  Outcome: Ongoing  Goal: Discharged to appropriate level of care  Description: Discharged to appropriate level of care  Outcome: Ongoing     Problem: Airway Clearance - Ineffective:  Goal: Ability to maintain a clear airway will improve  Description: Ability to maintain a clear airway will improve  Outcome: Ongoing     Problem: Anxiety/Stress:  Goal: Level of anxiety will decrease  Description: Level of anxiety will decrease  Outcome: Ongoing     Problem: Bowel Function - Altered:  Goal: Bowel elimination is within specified parameters  Description: Bowel elimination is within specified parameters  Outcome: Ongoing     Problem: Cardiac Output - Decreased:  Goal: Hemodynamic stability will improve  Description: Hemodynamic stability will improve  Outcome: Ongoing     Problem: Fluid Volume - Imbalance:  Goal: Absence of imbalanced fluid volume signs and symptoms  Description: Absence of imbalanced fluid volume signs and symptoms  Outcome: Ongoing     Problem: Pain:  Description: Pain management should include both nonpharmacologic and pharmacologic interventions. Goal: Pain level will decrease  Description: Pain level will decrease  Outcome: Ongoing  Goal: Recognizes and communicates pain  Description: Recognizes and communicates pain  Outcome: Ongoing  Goal: Control of acute pain  Description: Control of acute pain  Outcome: Ongoing  Goal: Control of chronic pain  Description: Control of chronic pain  Outcome: Ongoing     Problem: Skin Integrity - Impaired:  Goal: Will show no infection signs and symptoms  Description: Will show no infection signs and symptoms  Outcome: Ongoing  Goal: Absence of new skin breakdown  Description: Absence of new skin breakdown  Outcome: Ongoing

## 2021-01-19 NOTE — PROGRESS NOTES
Patient alert. Following commands, patient switched to CPAP. Pressure support 5. Volumes in 500s, SBI 30. O2 sats 95%.

## 2021-01-19 NOTE — PROGRESS NOTES
Pharmacy Vancomycin Consult     Vancomycin Day: 2  Current Dosing: Vancomycin pulse dosing    Temp max:  98.1    Recent Labs     01/17/21 2242 01/19/21  0132   BUN 65* 55*       Recent Labs     01/17/21 2242 01/19/21  0132   CREATININE 2.4* 2.1*       Recent Labs     01/17/21 2242 01/19/21  0132   WBC 11.7* 9.3         Intake/Output Summary (Last 24 hours) at 1/19/2021 0319  Last data filed at 1/19/2021 0200  Gross per 24 hour   Intake 4282.9 ml   Output 905 ml   Net 3377.9 ml       Culture Date Source Results   01/17/21 Blood  Sent    01/18/21 Blood  Sent             Ht Readings from Last 1 Encounters:   01/18/21 5' 8\" (1.727 m)        Wt Readings from Last 1 Encounters:   01/18/21 228 lb 3.2 oz (103.5 kg)         Body mass index is 34.7 kg/m². Estimated Creatinine Clearance: 33 mL/min (A) (based on SCr of 2.1 mg/dL (H)). Random level: 9.2 (21 hour level)    Assessment/Plan: Continue Vancomycin pulse dosing. Give Vancomycin 1250 mg IV x 1 dose today. Level ordered 24 hours post dose.     Electronically signed by Chance Camarillo [address] [place] [address] on 1/19/2021 at 3:19 AM

## 2021-01-19 NOTE — CONSULTS
Palliative Care:    Pt is known to palliative care team.  Pt presents to ED with AMS, fatigue. Pt found to be in acute resp distress and also septic. Intubated in ED and transferred to ICU. Past Medical History:        Past Medical History:   Diagnosis Date    Acute liver failure without hepatic coma 10/23/2018    Back pain     \"with tired legs as a result\"    Bladder cancer ([facility] [address] 12/19/2018    Blood circulation, collateral     Carotid arterial disease ([facility]CC)     recent surgery    CKD (chronic kidney disease), 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/2018    GERD (gastroesophageal reflux disease)     Hyperlipidemia     Hypertension     Hypertension     Palliative care patient 10/23/2018    Pneumonia due to infectious organism 11/6/2018    Primary osteoarthritis of left knee 10/14/2018    PVD (peripheral vascular disease) ([facility] [address]        Advance Directives:    Full Code. No AD on file. Will follow up. Pain/Other Symptoms:   Appears comfortable, on vent/sedated. Psychological/Spiritual:   Good family and spiritual support. Plan:        Patient/family discussion r/t goals:  Return home at pt baseline, amb without assist, clarity. Also would like for PC to have discussion with pt on his goals for AD. Spoke with family for update on pt. Clarify if any changes from last admission as far as his mentation, ADL's, appetite. Dtr states un til this ED visit pt has been doing \"pretty good\". Appetite good. No weight loss noted. States amb is sometimes unsteady but, adds pt has chronic knee and back pain. Palliative will follow for support, goals of care. Review of any needed services:    Possible HH or SNF for rehab if needed.  to follow up with AD conversation for [facility] and wishes.         Electronically signed by Kalia Christianson RN on 1/19/2021 at 9:40 AM

## 2021-01-20 LAB
ANION GAP SERPL CALCULATED.3IONS-SCNC: 9 MMOL/L (ref 7–19)
BASOPHILS ABSOLUTE: 0.1 K/UL (ref 0–0.2)
BASOPHILS RELATIVE PERCENT: 1.1 % (ref 0–1)
BUN BLDV-MCNC: 44 MG/DL (ref 8–23)
CALCIUM SERPL-MCNC: 7.8 MG/DL (ref 8.8–10.2)
CHLORIDE BLD-SCNC: 105 MMOL/L (ref 98–111)
CO2: 27 MMOL/L (ref 22–29)
CREAT SERPL-MCNC: 2 MG/DL (ref 0.5–1.2)
EOSINOPHILS ABSOLUTE: 0.1 K/UL (ref 0–0.6)
EOSINOPHILS RELATIVE PERCENT: 0.6 % (ref 0–5)
GFR AFRICAN AMERICAN: 39
GFR NON-AFRICAN AMERICAN: 32
GLUCOSE BLD-MCNC: 103 MG/DL (ref 70–99)
GLUCOSE BLD-MCNC: 125 MG/DL (ref 70–99)
GLUCOSE BLD-MCNC: 85 MG/DL (ref 70–99)
GLUCOSE BLD-MCNC: 89 MG/DL (ref 74–109)
GLUCOSE BLD-MCNC: 96 MG/DL (ref 70–99)
HCT VFR BLD CALC: 41.9 % (ref 42–52)
HEMOGLOBIN: 12.9 G/DL (ref 14–18)
IMMATURE GRANULOCYTES #: 0.6 K/UL
LYMPHOCYTES ABSOLUTE: 1.1 K/UL (ref 1.1–4.5)
LYMPHOCYTES RELATIVE PERCENT: 12.1 % (ref 20–40)
MAGNESIUM: 2.1 MG/DL (ref 1.6–2.4)
MCH RBC QN AUTO: 28.3 PG (ref 27–31)
MCHC RBC AUTO-ENTMCNC: 30.8 G/DL (ref 33–37)
MCV RBC AUTO: 91.9 FL (ref 80–94)
MONOCYTES ABSOLUTE: 0.6 K/UL (ref 0–0.9)
MONOCYTES RELATIVE PERCENT: 6.3 % (ref 0–10)
NEUTROPHILS ABSOLUTE: 6.4 K/UL (ref 1.5–7.5)
NEUTROPHILS RELATIVE PERCENT: 73.5 % (ref 50–65)
PDW BLD-RTO: 13.8 % (ref 11.5–14.5)
PERFORMED ON: ABNORMAL
PERFORMED ON: ABNORMAL
PERFORMED ON: NORMAL
PERFORMED ON: NORMAL
PHOSPHORUS: 4 MG/DL (ref 2.5–4.5)
PLATELET # BLD: 164 K/UL (ref 130–400)
PMV BLD AUTO: 10.6 FL (ref 9.4–12.4)
POTASSIUM SERPL-SCNC: 3.9 MMOL/L (ref 3.5–5)
RBC # BLD: 4.56 M/UL (ref 4.7–6.1)
SODIUM BLD-SCNC: 141 MMOL/L (ref 136–145)
VANCOMYCIN RANDOM: 11.7 UG/ML
WBC # BLD: 8.7 K/UL (ref 4.8–10.8)

## 2021-01-20 PROCEDURE — 2580000003 HC RX 258: Performed by: INTERNAL MEDICINE

## 2021-01-20 PROCEDURE — 97165 OT EVAL LOW COMPLEX 30 MIN: CPT

## 2021-01-20 PROCEDURE — 6360000002 HC RX W HCPCS: Performed by: INTERNAL MEDICINE

## 2021-01-20 PROCEDURE — 36415 COLL VENOUS BLD VENIPUNCTURE: CPT

## 2021-01-20 PROCEDURE — 97530 THERAPEUTIC ACTIVITIES: CPT

## 2021-01-20 PROCEDURE — 1210000000 HC MED SURG R&B

## 2021-01-20 PROCEDURE — 2500000003 HC RX 250 WO HCPCS: Performed by: INTERNAL MEDICINE

## 2021-01-20 PROCEDURE — 96116 NUBHVL XM PHYS/QHP 1ST HR: CPT

## 2021-01-20 PROCEDURE — 80202 ASSAY OF VANCOMYCIN: CPT

## 2021-01-20 PROCEDURE — 85025 COMPLETE CBC W/AUTO DIFF WBC: CPT

## 2021-01-20 PROCEDURE — 92522 EVALUATE SPEECH PRODUCTION: CPT

## 2021-01-20 PROCEDURE — 92610 EVALUATE SWALLOWING FUNCTION: CPT

## 2021-01-20 PROCEDURE — 84100 ASSAY OF PHOSPHORUS: CPT

## 2021-01-20 PROCEDURE — 80048 BASIC METABOLIC PNL TOTAL CA: CPT

## 2021-01-20 PROCEDURE — 97161 PT EVAL LOW COMPLEX 20 MIN: CPT

## 2021-01-20 PROCEDURE — 83735 ASSAY OF MAGNESIUM: CPT

## 2021-01-20 PROCEDURE — 2700000000 HC OXYGEN THERAPY PER DAY

## 2021-01-20 PROCEDURE — 82947 ASSAY GLUCOSE BLOOD QUANT: CPT

## 2021-01-20 RX ADMIN — FAMOTIDINE 20 MG: 10 INJECTION, SOLUTION INTRAVENOUS at 07:19

## 2021-01-20 RX ADMIN — VANCOMYCIN HYDROCHLORIDE 1250 MG: 10 INJECTION, POWDER, LYOPHILIZED, FOR SOLUTION INTRAVENOUS at 04:42

## 2021-01-20 RX ADMIN — SODIUM CHLORIDE, PRESERVATIVE FREE 10 ML: 5 INJECTION INTRAVENOUS at 07:18

## 2021-01-20 RX ADMIN — ENOXAPARIN SODIUM 40 MG: 40 INJECTION SUBCUTANEOUS at 07:18

## 2021-01-20 RX ADMIN — PIPERACILLIN AND TAZOBACTAM 3.38 G: 3; .375 INJECTION, POWDER, LYOPHILIZED, FOR SOLUTION INTRAVENOUS at 16:02

## 2021-01-20 RX ADMIN — PIPERACILLIN AND TAZOBACTAM 3.38 G: 3; .375 INJECTION, POWDER, LYOPHILIZED, FOR SOLUTION INTRAVENOUS at 06:34

## 2021-01-20 ASSESSMENT — PAIN SCALES - GENERAL
PAINLEVEL_OUTOF10: 0

## 2021-01-20 NOTE — PROGRESS NOTES
has a past surgical history that includes back surgery; Tonsillectomy and adenoidectomy; Colonoscopy (2007?); pr colonoscopy flx dx w/collj spec when pfrmd (N/A, 9/11/2017); pr revise median n/carpal tunnel surg (Left, 7/18/2018); pr thromboendartectmy neck,neck incis (Left, 8/28/2018); vascular surgery (04/21/2015); vascular surgery (01/13/2015); vascular surgery (03/11/2014); vascular surgery (01/18/2013); pr total knee arthroplasty (Left, 10/15/2018); vascular surgery (10/30/2018); vascular surgery (12/17/2018); and Cystoscopy (Bilateral, 12/19/2018).     Treatment Diagnosis: Hypoxemic respiratory failure, sepsis      Restrictions  Restrictions/Precautions  Restrictions/Precautions: Fall Risk    Subjective   General  Chart Reviewed: Yes  Patient assessed for rehabilitation services?: Yes  Family / Caregiver Present: No  Diagnosis: Hypoxemic respiratory failure, Sepsis  Patient Currently in Pain: No  Vital Signs  Temp: 99.1 °F (37.3 °C)  Temp Source: Temporal  Pulse: 80  Heart Rate Source: Monitor  Resp: 22  BP: 130/70  BP Location: Right upper arm  Patient Currently in Pain: No  Oxygen Therapy  SpO2: 97 %  O2 Device: Nasal cannula  O2 Flow Rate (L/min): 3 L/min  Social/Functional History  Social/Functional History  Lives With: Spouse  Type of Home: House  Home Layout: One level  Bathroom Shower/Tub: Walk-in shower  Bathroom Toilet: Handicap height  Bathroom Equipment: Built-in shower seat  ADL Assistance: Independent  Ambulation Assistance: Independent  Transfer Assistance: Independent  Occupation: Retired       Objective   Vision: Within Functional Limits  Hearing: Within functional limits    Orientation  Overall Orientation Status: Within Normal Limits        ADL  Feeding: Independent  Grooming: Independent  UE Bathing: Supervision  LE Bathing: Minimal assistance  UE Dressing: Supervision  LE Dressing: Minimal assistance  Toileting: Minimal assistance        Bed mobility  Supine to Sit: Minimal assistance Transfers  Stand Step Transfers: Minimal assistance  Sit to stand: Minimal assistance  Transfer Comments: Marleny to stand at bedside and sidestep to [facility] with hand held assist     Cognition  Overall Cognitive Status: WNL                 LUE AROM (degrees)  LUE AROM : WFL  RUE AROM (degrees)  RUE AROM : WFL  LUE Strength  Gross LUE Strength: Exceptions to [facility]  L Shoulder Flex: 4-/5  L Shoulder ABduction: 4-/5  L Elbow Flex: 4-/5  L Elbow Ext: 4-/5  RUE Strength  Gross RUE Strength: Exceptions to [facility]  R Shoulder Flex: 4-/5  R Shoulder ABduction: 4-/5  R Elbow Flex: 4-/5  R Elbow Ext: 4-/5                   Plan   Plan  Times per week: 3-5x/week  Times per day: Daily    G-Code     OutComes Score                                                  AM-PAC Score             Goals  Short term goals  Time Frame for Short term goals: 1 week  Short term goal 1: CGA with toilet tfers  Short term goal 2: CGA with clothing mgmt. in standing  Short term goal 3: Supervision with seated LB dsg with AE PRN  Short term goal 4: Demonstrate adequate endurance to complete light ambulatory activities without shortness of breath.   Long term goals  Long term goal 1: Return to PLOF       Therapy Time   Individual Concurrent Group Co-treatment   Time In           Time Out           Minutes                   MARINE Garcia/CORY

## 2021-01-20 NOTE — PROGRESS NOTES
Physical Therapy    Facility/Department: [facility] SURG SERVICES  Initial Assessment    NAME: Abbie Land  : 1940  MRN: [id number]    Date of Service: 2021    Discharge Recommendations:  Continue to assess pending progress, Patient would benefit from continued therapy after discharge        Assessment   Body structures, Functions, Activity limitations: Decreased functional mobility ; Decreased ADL status; Decreased ROM; Decreased strength;Decreased balance;Decreased posture;Decreased safe awareness;Decreased endurance  Assessment: Pt ABLE TO STAND EOB WITH ASSIST AND TAKE SMALL SIDE STEPS. UNSTEADY OVERALL, WILL BENEFIT FROM AD FOR SAFETY. CONT TO PROGRESS WITH MOBILITY. PT Education: PT Role;Plan of Care  REQUIRES PT FOLLOW UP: Yes  Activity Tolerance  Activity Tolerance: Patient Tolerated treatment well       Patient Diagnosis(es): The primary encounter diagnosis was Acute respiratory failure with hypoxia and hypercapnia ([address] Diagnoses of Pneumonitis, Hypoglycemia, and Altered mental status, unspecified altered mental status type were also pertinent to this visit. has a past medical history of Acute liver failure without hepatic coma, Back pain, Bladder cancer ([address] Blood circulation, collateral, Carotid arterial disease ([address] CKD (chronic kidney disease), stage II, GERD (gastroesophageal reflux disease), Hyperlipidemia, Hypertension, Hypertension, Palliative care patient, Pneumonia due to infectious organism, Primary osteoarthritis of left knee, PVD (peripheral vascular disease) ([address] and Tremor. has a past surgical history that includes back surgery; Tonsillectomy and adenoidectomy; Colonoscopy (2007?); pr colonoscopy flx dx w/collj spec when pfrmd (N/A, 9/11/2017); pr revise median n/carpal tunnel surg (Left, 7/18/2018); pr thromboendartectmy neck,neck incis (Left, 8/28/2018); vascular surgery (04/21/2015); vascular surgery (01/13/2015); vascular surgery (03/11/2014); vascular surgery (01/18/2013); pr total knee arthroplasty (Left, 10/15/2018); vascular surgery (10/30/2018); vascular surgery (12/17/2018); and Cystoscopy (Bilateral, 12/19/2018).     Restrictions  Restrictions/Precautions  Restrictions/Precautions: Fall Risk  Vision/Hearing        Subjective  General  Patient assessed for rehabilitation services?: Yes  Diagnosis: SEPSIS, ACUTE RESP FAILURE  Subjective  Subjective: Pt HESITANT TO PARTICIPATE DUE TO HAVING A BUSY DAY AND FEELING FATIGUED, BUT COOPERATIVE  Pain Screening  Patient Currently in Pain: No  Oxygen Therapy  O2 Device: Nasal cannula  O2 Flow Rate (L/min): 3 L/min       Orientation  Orientation  Overall Orientation Status: Within Normal Limits  Social/Functional History  Social/Functional History  Lives With: Spouse  Type of Home: House  Home Layout: One level  Bathroom Shower/Tub: Walk-in shower  Bathroom Toilet: Handicap height  Bathroom Equipment: Built-in shower seat  ADL Assistance: Independent  Ambulation Assistance: Independent  Transfer Assistance: Independent  Occupation: Retired  Cognition   Cognition  Overall Cognitive Status: WNL    Objective          PROM RLE (degrees)  RLE PROM: WNL  PROM LLE (degrees)  LLE PROM: WNL  Strength RLE  Comment: GROSSLY +3/5  Strength LLE  Comment: GROSSLY +3/5        Bed mobility  Supine to Sit: Minimal assistance(VC'S FOR TECHNIQUE)  Sit to Supine: Stand by assistance  Transfers  Sit to Stand: Minimal Assistance  Stand to sit: Minimal Assistance  Bed to Chair: Minimal assistance

## 2021-01-20 NOTE — PROGRESS NOTES
[place] Shanda transferred to [facility] [phone] from 150 via bed. Reason for transfer: lower level of care   Explained reason for transfer to Patient and Family. Belongings: Dentures upper with patient at bedside . Soft chart transferred with patient: Yes. Telemetry box number [id number] transferred with patient: yes. Report given to: Andie Kilpatrick at bedside.       Electronically signed by Christopher Guzman RN on 1/20/2021 at 12:46 PM

## 2021-01-20 NOTE — CARE COORDINATION
The [address] at [facility]  Notification of Admission Decision      [x] Patient has been accepted for admit to [facility] on : 1/21/21 Room 825      Please write discharge orders and summary prior to discharge. [] Patient acceptance to Rehab pending the following :    [] Eval in progress       [] Patient determined to be ineligible for services at [facility] because : We recommend you consider        Thank you for your referral, we appreciate you.     Electronically Signed by Mark Paul, Admissions Coordinator 1/20/2021 3:52 PM

## 2021-01-20 NOTE — PROGRESS NOTES
Speech Language Pathology  Facility/Department: [facility] SURG SERVICES  Initial Speech/Language/Cognitive Assessment    NAME: Gamaliel Garland  : 1940   MRN: [id number]  ADMISSION DATE: 2021  ADMITTING DIAGNOSIS: has Diverticulitis of large intestine with perforation without bleeding; Generalized abdominal pain; Colonic diverticular abscess; Bilateral carotid artery stenosis; Atherosclerosis of native artery of both lower extremities with intermittent claudication ([address] Carotid stenosis, asymptomatic, left; Primary osteoarthritis of left knee; Arthritis of knee; Essential hypertension; Pure hypercholesterolemia; Slow transit constipation; Iron deficiency anemia; GERD (gastroesophageal reflux disease); Acute liver failure without hepatic coma; CHF (congestive heart failure) ([address] Bilateral pleural effusion; Palliative care patient; Shock liver; Acute renal failure (HCC); BPH associated with nocturia; Bleeding diathesis ([address] Epistaxis; Acute blood loss anemia; Melena; Hypocalcemia; Pneumonia due to infectious organism; Bladder cancer ([address] Postoperative pain; History of bladder cancer; Severe sepsis ([address] Acute on chronic respiratory failure ([address] UTI (urinary tract infection); Acute on chronic kidney failure ([address] Hypoxemia; Metabolic acidosis; and Acidosis, metabolic, with respiratory acidosis on their problem list.      Date of Eval: 2021   Evaluating Therapist: HALINA Koenig    RECENT RESULTS  CT OF HEAD/MRI:  Narrative   Examination. CT HEAD WO CONTRAST 2021 10:14 PM   History: Altered mental status. DLP: 886 mGycm. The CT scan of the head is performed without intravenous contrast   enhancement. The images are acquired in axial plane with subsequent   reconstruction in coronal and sagittal planes. The comparison is made with the previous study dated 8/15/2018. There is no evidence of a mass or midline shift.  There is no evidence of intracranial hemorrhage or hematoma. Moderately prominent   ventricles, basal cistern and the cortical sulci are age-appropriate. There are scattered areas of chronic white matter ischemia in the   centrum semiovale bilaterally, similar to the previous study. The   gray-white matter differentiation is maintained. A partially empty sella turcica is seen. The images reviewed in bone window show no acute bony abnormality. Visualized paranasal sinuses and mastoid air cells.       Impression   No acute intracranial abnormality. The above study was initially reviewed and reported by stat rads. I do   not find any discrepancies. Signed by Dr Elvira Murrell on 1/18/2021 6:47 AM         Primary Complaint:   None verbalized    Pain:  Pain Assessment  Pain Assessment: 0-10  Pain Level: 0  Response to Pain Intervention: Asleep with RR greater than 10  RASS Score: Drowsy - Patient awakens with sustained eye opening and eye contact  Pain Assessment/FLACC  Pain Rating: FLACC (rest) - Face: no particular expression or smile  Pain Rating: FLACC (rest) - Legs: normal position or relaxed  Pain Rating: FLACC (rest) - Activity: lying quietly, normal position, moves easily  Pain Rating: FLACC (rest) - Cry: no cry (awake or asleep)  Pain Rating: FLACC (rest) - Consolability: content, relaxed  Score: FLACC (rest): 0    Assessment:  Cognitive Diagnosis: Pt presents at the bedside with functional cognitive skills. Diagnosis: Pt presents with functional cognitive skills as evidenced by the Mini-Mental State Exam. Pt has minimal deficits with processing speed. Pt also has mild word-finding deficits in conversation that are resolved with a short response latency. Pt was observed with speech errors occasionally in spontnaeous speech utterances. Pt did self-correct errors 1/2x.     Recommendations:  Requires SLP Intervention: Yes           Plan:    Pt will participate in skilled dysphagia treatment Patient/family involved in developing goals and treatment plan: y    Subjective:   Previous level of function and limitations: Pt verbalizes he was independent with the following: medication management, bill/mail management, grocery shopping and pick-up; bathroom mobility, dressing, walking (occasinal rollator to sit or rest); use of cell phone and laptop/Ipad; pt was a . General  Chart Reviewed: Yes  Family / Caregiver Present: No  Subjective  Subjective: Pt alert in bed. Pt pleasant and cooperative. Pt frequently wanted to discuss his medical hx events. Objective:     Oral/Motor  Oral Motor: Within functional limits    Auditory Comprehension  Comprehension: Within Functional Limits         Expression  Primary Mode of Expression: Verbal    Verbal Expression  Verbal Expression: Within functional limits    Written Expression  Dominant Hand: Right  Written Expression: Within Functional Limits    Motor Speech  Motor Speech: Within Functional Limits    Pragmatics/Social Functioning  Pragmatics: Within functional limits    Cognition:    Mini-mental state exam (MMSE) completed. Pt scored 28/30 which indicated a typical cognitive level. Pt had difficulty stating the date (could state month and year only); and difficulty with drawing a shape figure. Further assessing was completed. Pt participated in discussion about independence in the home environment. Pt reported he is independent and lives with his wife. He does ask for help with lifting heavy items.      Orientation  Overall Orientation Status: Within Functional Limits  Attention  Attention: Within Functional Limits  Memory  Memory: Within Funtional Limits  Problem Solving  Problem Solving: Within Functional Limits  Numeric Reasoning  Numeric Reasoning: Within Functional Limits  Abstract Reasoning  Abstract Reasoning: Within Functional Limits  Safety/Judgement  Safety/Judgement: Unable to assess    Additional Assessments:  Voice Evaluation Vocal Quality: Exceptions to [facility]  Breath Support: Adequate for speech  Harsh: Mild  Hoarse: Mild  Maximum Phonation Time: 6 seconds          Prognosis:  Individuals consulted  Consulted and agree with results and recommendations: Patient    Education:  Patient Education: Review of MMSE and score completed. Re-educated pt on diet and liquid levels.   Patient Education Response: Verbalizes understanding             HALINA Fuentes  1/20/2021 1:47 PM   Electronically signed by Stanton Hickman on 1/20/21 at 1:57 PM CST

## 2021-01-20 NOTE — PROGRESS NOTES
Palliative care follow up note. Report from RN. Pt improved, possible move to floor today. Still has intermittent confusion, but can recall some events over the past few days. On 3L O2 current;y. Appears comfortable. Spouse visited with pt this morning. Palliative following for support, goals of care.     Electronically signed by Janey Leal RN on 1/20/2021 at 9:43 AM

## 2021-01-20 NOTE — PROGRESS NOTES
Pharmacy Vancomycin Consult     Vancomycin Day: 3  Current Dosing: Vancomycin pulse dosing     Temp max:  98.7    Recent Labs     01/19/21  0132 01/20/21  0129   BUN 55* 44*       Recent Labs     01/19/21  0132 01/20/21  0129   CREATININE 2.1* 2.0*       Recent Labs     01/19/21  0132 01/20/21  0129   WBC 9.3 8.7         Intake/Output Summary (Last 24 hours) at 1/20/2021 0358  Last data filed at 1/20/2021 0000  Gross per 24 hour   Intake 3920.11 ml   Output 1030 ml   Net 2890.11 ml       Culture Date Source Results   01/17/21 Blood x 2  No growth    01/18/21 Blood  No growth             Ht Readings from Last 1 Encounters:   01/18/21 5' 8\" (1.727 m)        Wt Readings from Last 1 Encounters:   01/19/21 240 lb (108.9 kg)         Body mass index is 36.49 kg/m². Estimated Creatinine Clearance: 36 mL/min (A) (based on SCr of 2 mg/dL (H)). Random level: 11.7 (21 hour level)    Assessment/Plan: Continue Vancomycin pulse dosing. Give Vancomycin 1250 mg IV x 1 dose today. Level ordered 24 hours post dose.     Electronically signed by Ann Corral, [facility] on 1/20/2021 at 3:58 AM

## 2021-01-20 NOTE — PROGRESS NOTES
Merary BESS Ultrasound guided access of left common femoral artery. Aortogram.Diagnostic right lower extremity arteriogram.Radiologic supervision and interpretation.  VASCULAR SURGERY  01/13/2015    Merary Gore M.D Atherectomy,angioplasty,and stenting of left superficial femoral artery.  VASCULAR SURGERY  03/11/2014    Merary Gore M.D. Ultrasound-guided access of right common femoral artery. Aortogram.Left lower extremity arteriogram.Atherectomy and angioplasty of left superficial femoral artery. Radiologic supervision and interpretation.  VASCULAR SURGERY  01/18/2013    Merary BESS Aortogram.Multistation arteriogram right lower extremity. Laser atherectomy and angioplasty of right superficial femoral artery. Selective catheterization of right tibioperoneal trunk. Angioplasty of peroneal artery and tibioperoneal trunk.  VASCULAR SURGERY  10/30/2018    [facility]. Ultrasound guided cannulation of right internal vein. Placement of right internal jugular vein tunneled dialysis catheter bard equistream xk 23cm tip to cuff    VASCULAR SURGERY  12/17/2018    [facility]. Removal of tunneled dilaysis catheter right internal jugular vein.        Family History   Problem Relation Age of Onset    Colon Cancer Father     Diabetes Brother     Colon Polyps Neg Hx     Liver Cancer Neg Hx     Liver Disease Neg Hx     Esophageal Cancer Neg Hx     Rectal Cancer Neg Hx     Stomach Cancer Neg Hx        Social History     Socioeconomic History    Marital status:      Spouse name: Not on file    Number of children: Not on file    Years of education: Not on file    Highest education level: Not on file   Occupational History    Not on file   Social Needs    Financial resource strain: Not on file    Food insecurity     Worry: Not on file     Inability: Not on file    Transportation needs     Medical: Not on file     Non-medical: Not on file   Tobacco Use    Smoking status: Former Smoker Quit date: 6/3/2003     Years since quittin.6    Smokeless tobacco: Never Used   Substance and Sexual Activity    Alcohol use:  Yes     Alcohol/week: 12.0 standard drinks     Types: 12 Glasses of wine per week     Comment: 2 glasses of wine every night    Drug use: No    Sexual activity: Yes     Partners: Female   Lifestyle    Physical activity     Days per week: Not on file     Minutes per session: Not on file    Stress: Not on file   Relationships    Social connections     Talks on phone: Not on file     Gets together: Not on file     Attends [employer] service: Not on file     Active member of club or organization: Not on file     Attends meetings of clubs or organizations: Not on file     Relationship status: Not on file    Intimate partner violence     Fear of current or ex partner: Not on file     Emotionally abused: Not on file     Physically abused: Not on file     Forced sexual activity: Not on file   Other Topics Concern    Not on file   Social History Narrative    Not on file       Current Facility-Administered Medications   Medication Dose Route Frequency Provider Last Rate Last Admin    sodium chloride flush 0.9 % injection 10 mL  10 mL Intravenous 2 times per day Valencia Alex MD   10 mL at 2118    sodium chloride flush 0.9 % injection 10 mL  10 mL Intravenous PRN Valencia Alex MD        enoxaparin (LOVENOX) injection 40 mg  40 mg Subcutaneous Daily Valencia Alex MD   40 mg at 2118    polyethylene glycol (GLYCOLAX) packet 17 g  17 g Oral Daily PRN Valencia Alex MD        acetaminophen (TYLENOL) tablet 650 mg  650 mg Oral Q6H PRN Valencia Alex MD        Or    acetaminophen (TYLENOL) suppository 650 mg  650 mg Rectal Q6H PRN Valencia Alex MD        potassium chloride (KLOR-CON M) extended release tablet 40 mEq  40 mEq Oral PRN Valencia Alex MD        Or  potassium bicarb-citric acid (EFFER-K) effervescent tablet 40 mEq  40 mEq Oral PRN Jack Watson MD        Or    potassium chloride 10 mEq/100 mL IVPB (Peripheral Line)  10 mEq Intravenous PRN Jack Watson  mL/hr at 01/19/21 0753 10 mEq at 01/19/21 0753    magnesium sulfate 2 g in 50 mL IVPB premix  2 g Intravenous PRN Jack Watson MD        ondansetron (ZOFRAN) injection 4 mg  4 mg Intravenous Q6H PRN Jack Watson MD        piperacillin-tazobactam (ZOSYN) 3.375 g in dextrose 5 % 50 mL IVPB extended infusion (mini-bag)  3.375 g Intravenous Q8H Jack Watson MD   Stopped at 01/20/21 1034    insulin lispro (HUMALOG) injection vial 0-12 Units  0-12 Units Subcutaneous TID WC Jack Watson MD        insulin lispro (HUMALOG) injection vial 0-6 Units  0-6 Units Subcutaneous Nightly Jack Watson MD        vancomycin (VANCOCIN) intermittent dosing (placeholder)   Other RX Placeholder Jack Watson MD        famotidine (PEPCID) injection 20 mg  20 mg Intravenous Daily Jack Watson MD   20 mg at 01/20/21 0719    0.9 % sodium chloride infusion   Intravenous Continuous Shivam Vick DO 75 mL/hr at 01/19/21 1800 Rate Verify at 01/19/21 1800    dextrose 50 % IV solution  25 g Intravenous PRN Shivam Vick DO   25 g at 01/18/21 2251         sodium chloride 75 mL/hr at 01/19/21 1800        Objective:   BP (!) 136/59   Pulse 67   Temp 98.4 °F (36.9 °C) (Temporal)   Resp 26   Ht 5' 8\" (1.727 m)   Wt 241 lb 14.4 oz (109.7 kg)   SpO2 98%   BMI 36.78 kg/m²     General: no acute distress  HEENT: normocephalic, atraumatic  Neck: supple, symmetrical, trachea midline   Lungs: mild rhonchi  Cardiovascular: s1 and s2 normal  Abdomen: soft, positive bowel sounds, nondistended, nontender  Extremities: no edema or cyanosis   Neuro: aaox3, no focal motor deficits  Skin: normal color and texture     Recent Labs 01/17/21 2242 01/19/21 0132 01/20/21  0129   WBC 11.7* 9.3 8.7   RBC 5.02 4.54* 4.56*   HGB 14.2 12.9* 12.9*   HCT 48.0 40.2* 41.9*   MCV 95.6* 88.5 91.9   MCH 28.3 28.4 28.3   MCHC 29.6* 32.1* 30.8*    171 164     Recent Labs     01/17/21 2242 01/17/21 2242 01/19/21 0132 01/19/21  1313 01/20/21  0129     --  140  --  141   K 4.1   < > 3.0* 3.4 3.9   ANIONGAP 7  --  11  --  9     --  104  --  105   CO2 33*  --  25  --  27   BUN 65*  --  55*  --  44*   CREATININE 2.4*  --  2.1*  --  2.0*   GLUCOSE 41*  --  112*  --  89   CALCIUM 8.9  --  7.8*  --  7.8*    < > = values in this interval not displayed. Recent Labs     01/19/21 0132 01/20/21  0129   MG 2.0 2.1   PHOS  --  4.0     Recent Labs     01/17/21 2242 01/19/21 0132   AST 29 14   ALT 35 18   BILITOT 0.3 0.6   ALKPHOS 119 82     No results for input(s): PH, PO2, PCO2, HCO3, BE, O2SAT in the last 72 hours. No results for input(s): TROPONINI in the last 72 hours. No results for input(s): INR in the last 72 hours. Recent Labs     01/19/21  0132   LACTA 1.3         Intake/Output Summary (Last 24 hours) at 1/20/2021 1134  Last data filed at 1/20/2021 1000  Gross per 24 hour   Intake 2661.86 ml   Output 2060 ml   Net 601.86 ml       No results found.      Assessment and Plan:   Sepsis  Resolved  Suspect secondary to below  Broad-spectrum antibiotics  Follow cultures  IVF  Lactate 1.3    CAP  Broad-spectrum antibiotics  Follow cultures  Molecular respiratory panel negative    Concern for acute cystitis  Antibiotics    Ventilator dependent acute hypercapnic respiratory failure  Resolved  Suspect secondary to above processes  Extubated 1/19/2021  Monitor respiratory status    ANA/CKD3  Creatinine near or at baseline  IVF  Avoid offending agents    DVT prophylaxis  Lovenox    Joie Terrazas MD   1/20/2021 11:34 AM

## 2021-01-20 NOTE — PROGRESS NOTES
Speech Language Pathology  Facility/Department: [facility] SURG SERVICES   CLINICAL BEDSIDE SWALLOW EVALUATION  SPEECH PRODUCTION EVALUATION     NAME: Sandy Manzo  : 1940  MRN: [id number]    ADMISSION DATE: 2021  ADMITTING DIAGNOSIS: has Diverticulitis of large intestine with perforation without bleeding; Generalized abdominal pain; Colonic diverticular abscess; Bilateral carotid artery stenosis; Atherosclerosis of native artery of both lower extremities with intermittent claudication ([address] Carotid stenosis, asymptomatic, left; Primary osteoarthritis of left knee; Arthritis of knee; Essential hypertension; Pure hypercholesterolemia; Slow transit constipation; Iron deficiency anemia; GERD (gastroesophageal reflux disease); Acute liver failure without hepatic coma; CHF (congestive heart failure) ([address] Bilateral pleural effusion; Palliative care patient; Shock liver; Acute renal failure (HCC); BPH associated with nocturia; Bleeding diathesis ([address] Epistaxis; Acute blood loss anemia; Melena; Hypocalcemia; Pneumonia due to infectious organism; Bladder cancer ([address] Postoperative pain; History of bladder cancer; Severe sepsis ([address] Acute on chronic respiratory failure ([address] UTI (urinary tract infection); Acute on chronic kidney failure ([address] Hypoxemia; Metabolic acidosis; and Acidosis, metabolic, with respiratory acidosis on their problem list.    Date of Eval: 2021  Evaluating Therapist: Boris Thacker    Current Diet level:  NPO    Reason for Referral  Sandy Manzo was referred for a bedside swallow evaluation to assess the efficiency of his swallow function, identify signs and symptoms of aspiration and make recommendations regarding safe dietary consistencies, effective compensatory strategies, and safe eating environment.     Impression Assessed patient's swallowing function S/P extubation 1/19. Patient exhibits decreased oral prep of more solid consistencies, fast oral transit and suspected swallow delay with thin liquids, and sluggish, mild-moderately decreased laryngeal elevation for swallow airway protection. Even so, no outward S/S penetration/aspiration was noted with any ice chip trial, puree consistency trial, regular solid consistency trial, nectar thick liquid trial, or thin H2O trial presented during evaluation this date. At this time, would trial soft and bite sized consistency and nectar thick liquids. Recommend meds whole in pudding/applesauce. If patient receives mouth care prior to intake, okay for ice chips and small sips thin H2O IN BETWEEN MEALS for comfort. Will continue to follow. Thank you for this consult. Treatment Plan  Requires SLP Intervention: Yes     Recommended Diet and Intervention  Diet Solids Recommendation: Soft and bite sized  Liquid Consistency Recommendation: Nectar thick   Recommended Form of Meds: Meds whole in puree as able  Therapeutic Interventions: Patient/Family education;Diet tolerance monitoring; Therapeutic PO trials with SLP     Compensatory Swallowing Strategies  Compensatory Swallowing Strategies: Upright as possible for all oral intake;Small bites/sips;Eat/Feed slowly; Alternate solids and liquids; Remain upright for 30-45 minutes after meals     Treatment/Goals  Timeframe for Short-term Goals: 1x/day for 3 days   Goal 1: Patient will tolerate soft and bite sized consistency and nectar thick liquids with min S/S penetration/aspiration during PO intake. Goal 2: Patient staff will follow swallow safety recommendations to decrease risk of penetration/aspiration during PO intake. Goal 3: Re-assessment of swallow function for potential diet upgrade. Goal 4: Monitor speech production. Goal 5: Cognitive-linguistic eval      General  Chart Reviewed: Yes  Behavior/Cognition: Alert; Cooperative O2 Device: Nasal Cannula  Communication Observation: (Assessed patient's speech production. Patient exhibits mildly slowed, decreased lingual movements during verbalizations. SLP still ranked functional intelligibility of speech for unfamiliar listeners at 100% in utterances with background noise present.)  Follows Directions: Simple   Dentition: Lower dentures noted. Patient Positioning: Upright in bed  Consistencies Administered: Ice chips;Dysphagia Pureed (Dysphagia I); Regular solid; Nectar - cup; Thin - cup      Oral Motor Examination   Labial ROM: (Decreased, on the right, during labial retraction trials and labial protrusion trials.)  Labial Strength: (Adequate during labial compression trials.)  Labial Coordination: (Adequate movements were noted.)  Lingual ROM: (Adequate during lingual extension trials with full point achieved; decreased during lingual elevation trials without use of accessory jaw movement; adequate movements noted bilaterally.)  Lingual Symmetry: (Deviation was noted, to the left, during lingual extension trials.)  Lingual Strength: (Decreased with mild fasciculations noted during lingual extension trials.)  Lingual Coordination: (Slowed movements were noted.)     Assessed patient's swallowing function with the following observations noted:     Oral Phase  Mastication: Ice chips;Regular solid (Patient exhibited decreased rotary jaw movement during oral prep of ice chip trials and regular solid consistency trials presented by SLP.)  Suspected Premature Bolus Loss: Regular solid; Thin - cup (Oral transit of regular solid consistency trials varied from 1-3 seconds in length.  Patient exhibited fast oral transit of thin H2O trials presented via cup by SLP.) Oral Phase - Comment: Oral transit of ice chip trials primarily measured 1-2 seconds in length. Oral transit of puree consistency trials, presented by SLP, primarily measured 1-2 seconds in length and no oral cavity residue was noted post swallows. Min regular solid consistency residue was observed post swallows; residue cleared from the mouth with additional dry swallows. Oral transit of nectar thick liquid trials, presented via cup by SLP, primarily measured 1-2 seconds in length. Pharyngeal Phase  Suspected Swallow Delay: Regular solid; Thin - cup (Suspect secondary to oral transit times.)  Laryngeal Elevation: (Patient exhibited sluggish, mild-moderately decreased laryngeal elevation for swallow airway protection.)  Pharyngeal Phase - Comment: No outward S/S penetration/aspiration was noted with any ice chip trial, puree consistency trial, regular solid consistency trial, nectar thick liquid trial, or thin H2O trial administered during evaluation this date. At this time, would trial soft and bite sized consistency and nectar thick liquids. Recommend meds whole in pudding/applesauce. If patient receives mouth care prior to intake, okay for ice chips and small sips thin H2O IN BETWEEN MEALS for comfort. Will continue to follow.     Electronically signed by HALINA Medina on 1/20/2021 at 1:16 PM

## 2021-01-21 ENCOUNTER — HOSPITAL ENCOUNTER (INPATIENT)
Age: 81
LOS: 8 days | Discharge: HOME HEALTH | DRG: 189 | End: 2021-01-29
Attending: PSYCHIATRY & NEUROLOGY | Admitting: PSYCHIATRY & NEUROLOGY
Payer: MEDICARE

## 2021-01-21 VITALS
BODY MASS INDEX: 36.66 KG/M2 | OXYGEN SATURATION: 96 % | RESPIRATION RATE: 18 BRPM | WEIGHT: 241.9 LBS | HEART RATE: 79 BPM | TEMPERATURE: 99.2 F | HEIGHT: 68 IN | DIASTOLIC BLOOD PRESSURE: 77 MMHG | SYSTOLIC BLOOD PRESSURE: 141 MMHG

## 2021-01-21 DIAGNOSIS — M17.10 ARTHRITIS OF KNEE: Primary | ICD-10-CM

## 2021-01-21 DIAGNOSIS — I10 ESSENTIAL HYPERTENSION: ICD-10-CM

## 2021-01-21 DIAGNOSIS — R53.1 WEAKNESS: ICD-10-CM

## 2021-01-21 DIAGNOSIS — N17.9 ACUTE RENAL FAILURE, UNSPECIFIED ACUTE RENAL FAILURE TYPE (HCC): ICD-10-CM

## 2021-01-21 DIAGNOSIS — C67.9 MALIGNANT NEOPLASM OF URINARY BLADDER, UNSPECIFIED SITE (HCC): ICD-10-CM

## 2021-01-21 DIAGNOSIS — I50.9 CONGESTIVE HEART FAILURE, UNSPECIFIED HF CHRONICITY, UNSPECIFIED HEART FAILURE TYPE (HCC): ICD-10-CM

## 2021-01-21 PROBLEM — J96.00 ACUTE RESPIRATORY FAILURE (HCC): Status: ACTIVE | Noted: 2021-01-21

## 2021-01-21 PROBLEM — R13.19 OTHER DYSPHAGIA: Status: ACTIVE | Noted: 2021-01-21

## 2021-01-21 PROBLEM — E11.8 TYPE 2 DIABETES MELLITUS WITH COMPLICATION, WITHOUT LONG-TERM CURRENT USE OF INSULIN (HCC): Status: ACTIVE | Noted: 2021-01-21

## 2021-01-21 LAB
ANION GAP SERPL CALCULATED.3IONS-SCNC: 11 MMOL/L (ref 7–19)
BACTERIA: NEGATIVE /HPF
BASOPHILS ABSOLUTE: 0 K/UL (ref 0–0.2)
BASOPHILS RELATIVE PERCENT: 0.6 % (ref 0–1)
BILIRUBIN URINE: NEGATIVE
BLOOD, URINE: NEGATIVE
BUN BLDV-MCNC: 29 MG/DL (ref 8–23)
CALCIUM SERPL-MCNC: 8.1 MG/DL (ref 8.8–10.2)
CHLORIDE BLD-SCNC: 105 MMOL/L (ref 98–111)
CLARITY: CLEAR
CO2: 28 MMOL/L (ref 22–29)
COLOR: YELLOW
CREAT SERPL-MCNC: 1.8 MG/DL (ref 0.5–1.2)
CRYSTALS, UA: ABNORMAL /HPF
EOSINOPHILS ABSOLUTE: 0 K/UL (ref 0–0.6)
EOSINOPHILS RELATIVE PERCENT: 0 % (ref 0–5)
EPITHELIAL CELLS, UA: 1 /HPF (ref 0–5)
GFR AFRICAN AMERICAN: 44
GFR NON-AFRICAN AMERICAN: 37
GLUCOSE BLD-MCNC: 107 MG/DL (ref 70–99)
GLUCOSE BLD-MCNC: 114 MG/DL (ref 70–99)
GLUCOSE BLD-MCNC: 82 MG/DL (ref 70–99)
GLUCOSE BLD-MCNC: 84 MG/DL (ref 70–99)
GLUCOSE BLD-MCNC: 84 MG/DL (ref 74–109)
GLUCOSE URINE: NEGATIVE MG/DL
HCT VFR BLD CALC: 42.1 % (ref 42–52)
HEMOGLOBIN: 12.5 G/DL (ref 14–18)
HYALINE CASTS: 1 /HPF (ref 0–8)
IMMATURE GRANULOCYTES #: 0.3 K/UL
INR BLD: 1.16 (ref 0.88–1.18)
KETONES, URINE: NEGATIVE MG/DL
LEUKOCYTE ESTERASE, URINE: NEGATIVE
LYMPHOCYTES ABSOLUTE: 1.2 K/UL (ref 1.1–4.5)
LYMPHOCYTES RELATIVE PERCENT: 17 % (ref 20–40)
MAGNESIUM: 2.1 MG/DL (ref 1.6–2.4)
MCH RBC QN AUTO: 28.2 PG (ref 27–31)
MCHC RBC AUTO-ENTMCNC: 29.7 G/DL (ref 33–37)
MCV RBC AUTO: 95 FL (ref 80–94)
MONOCYTES ABSOLUTE: 0.6 K/UL (ref 0–0.9)
MONOCYTES RELATIVE PERCENT: 8.3 % (ref 0–10)
NEUTROPHILS ABSOLUTE: 4.7 K/UL (ref 1.5–7.5)
NEUTROPHILS RELATIVE PERCENT: 70 % (ref 50–65)
NITRITE, URINE: NEGATIVE
PDW BLD-RTO: 13.9 % (ref 11.5–14.5)
PERFORMED ON: ABNORMAL
PERFORMED ON: ABNORMAL
PERFORMED ON: NORMAL
PERFORMED ON: NORMAL
PH UA: 5 (ref 5–8)
PLATELET # BLD: 169 K/UL (ref 130–400)
PMV BLD AUTO: 11.3 FL (ref 9.4–12.4)
POTASSIUM SERPL-SCNC: 4 MMOL/L (ref 3.5–5)
PREALBUMIN: 12 MG/DL (ref 20–40)
PROTEIN UA: 100 MG/DL
PROTHROMBIN TIME: 14.8 SEC (ref 12–14.6)
RBC # BLD: 4.43 M/UL (ref 4.7–6.1)
RBC UA: 3 /HPF (ref 0–4)
SARS-COV-2, NAAT: NOT DETECTED
SODIUM BLD-SCNC: 144 MMOL/L (ref 136–145)
SPECIFIC GRAVITY UA: 1.02 (ref 1–1.03)
TSH REFLEX FT4: 2.89 UIU/ML (ref 0.35–5.5)
UROBILINOGEN, URINE: 1 E.U./DL
VANCOMYCIN RANDOM: 15.1 UG/ML
VITAMIN B-12: 719 PG/ML (ref 211–946)
WBC # BLD: 6.8 K/UL (ref 4.8–10.8)
WBC UA: 2 /HPF (ref 0–5)

## 2021-01-21 PROCEDURE — 82947 ASSAY GLUCOSE BLOOD QUANT: CPT

## 2021-01-21 PROCEDURE — 85025 COMPLETE CBC W/AUTO DIFF WBC: CPT

## 2021-01-21 PROCEDURE — 80048 BASIC METABOLIC PNL TOTAL CA: CPT

## 2021-01-21 PROCEDURE — 2580000003 HC RX 258: Performed by: INTERNAL MEDICINE

## 2021-01-21 PROCEDURE — 92526 ORAL FUNCTION THERAPY: CPT

## 2021-01-21 PROCEDURE — 80202 ASSAY OF VANCOMYCIN: CPT

## 2021-01-21 PROCEDURE — 85610 PROTHROMBIN TIME: CPT

## 2021-01-21 PROCEDURE — 92507 TX SP LANG VOICE COMM INDIV: CPT

## 2021-01-21 PROCEDURE — U0002 COVID-19 LAB TEST NON-CDC: HCPCS

## 2021-01-21 PROCEDURE — 82607 VITAMIN B-12: CPT

## 2021-01-21 PROCEDURE — 6370000000 HC RX 637 (ALT 250 FOR IP): Performed by: INTERNAL MEDICINE

## 2021-01-21 PROCEDURE — 1180000000 HC REHAB R&B

## 2021-01-21 PROCEDURE — 83735 ASSAY OF MAGNESIUM: CPT

## 2021-01-21 PROCEDURE — 2500000003 HC RX 250 WO HCPCS: Performed by: INTERNAL MEDICINE

## 2021-01-21 PROCEDURE — 84134 ASSAY OF PREALBUMIN: CPT

## 2021-01-21 PROCEDURE — 36415 COLL VENOUS BLD VENIPUNCTURE: CPT

## 2021-01-21 PROCEDURE — 97116 GAIT TRAINING THERAPY: CPT

## 2021-01-21 PROCEDURE — 6360000002 HC RX W HCPCS: Performed by: INTERNAL MEDICINE

## 2021-01-21 PROCEDURE — 84443 ASSAY THYROID STIM HORMONE: CPT

## 2021-01-21 PROCEDURE — 81001 URINALYSIS AUTO W/SCOPE: CPT

## 2021-01-21 RX ORDER — TAMSULOSIN HYDROCHLORIDE 0.4 MG/1
0.4 CAPSULE ORAL DAILY
Status: CANCELLED | OUTPATIENT
Start: 2021-01-21

## 2021-01-21 RX ORDER — CEFDINIR 300 MG/1
300 CAPSULE ORAL 2 TIMES DAILY
Qty: 14 CAPSULE | Refills: 0 | Status: ON HOLD | OUTPATIENT
Start: 2021-01-21 | End: 2021-01-29 | Stop reason: HOSPADM

## 2021-01-21 RX ORDER — NICOTINE POLACRILEX 4 MG
15 LOZENGE BUCCAL PRN
Status: DISCONTINUED | OUTPATIENT
Start: 2021-01-21 | End: 2021-01-29 | Stop reason: HOSPADM

## 2021-01-21 RX ORDER — DEXTROSE MONOHYDRATE 25 G/50ML
25 INJECTION, SOLUTION INTRAVENOUS PRN
Status: DISCONTINUED | OUTPATIENT
Start: 2021-01-21 | End: 2021-01-29 | Stop reason: HOSPADM

## 2021-01-21 RX ORDER — POTASSIUM CHLORIDE 20 MEQ/1
40 TABLET, EXTENDED RELEASE ORAL PRN
Status: DISCONTINUED | OUTPATIENT
Start: 2021-01-21 | End: 2021-01-29 | Stop reason: HOSPADM

## 2021-01-21 RX ORDER — MAGNESIUM SULFATE IN WATER 40 MG/ML
2 INJECTION, SOLUTION INTRAVENOUS PRN
Status: CANCELLED | OUTPATIENT
Start: 2021-01-21

## 2021-01-21 RX ORDER — METOPROLOL SUCCINATE 50 MG/1
100 TABLET, EXTENDED RELEASE ORAL DAILY
Status: CANCELLED | OUTPATIENT
Start: 2021-01-22

## 2021-01-21 RX ORDER — SODIUM CHLORIDE 0.9 % (FLUSH) 0.9 %
10 SYRINGE (ML) INJECTION EVERY 12 HOURS SCHEDULED
Status: CANCELLED | OUTPATIENT
Start: 2021-01-21

## 2021-01-21 RX ORDER — DEXTROSE MONOHYDRATE 25 G/50ML
12.5 INJECTION, SOLUTION INTRAVENOUS PRN
Status: CANCELLED | OUTPATIENT
Start: 2021-01-21

## 2021-01-21 RX ORDER — SODIUM CHLORIDE 0.9 % (FLUSH) 0.9 %
10 SYRINGE (ML) INJECTION EVERY 12 HOURS SCHEDULED
Status: DISCONTINUED | OUTPATIENT
Start: 2021-01-21 | End: 2021-01-28

## 2021-01-21 RX ORDER — AZITHROMYCIN 500 MG/1
500 TABLET, FILM COATED ORAL DAILY
Qty: 3 TABLET | Refills: 0 | Status: ON HOLD | OUTPATIENT
Start: 2021-01-21 | End: 2021-01-29 | Stop reason: HOSPADM

## 2021-01-21 RX ORDER — SODIUM CHLORIDE 0.9 % (FLUSH) 0.9 %
10 SYRINGE (ML) INJECTION PRN
Status: CANCELLED | OUTPATIENT
Start: 2021-01-21

## 2021-01-21 RX ORDER — MAGNESIUM SULFATE IN WATER 40 MG/ML
2 INJECTION, SOLUTION INTRAVENOUS PRN
Status: DISCONTINUED | OUTPATIENT
Start: 2021-01-21 | End: 2021-01-29 | Stop reason: HOSPADM

## 2021-01-21 RX ORDER — DEXTROSE MONOHYDRATE 25 G/50ML
12.5 INJECTION, SOLUTION INTRAVENOUS PRN
Status: DISCONTINUED | OUTPATIENT
Start: 2021-01-21 | End: 2021-01-29 | Stop reason: HOSPADM

## 2021-01-21 RX ORDER — TAMSULOSIN HYDROCHLORIDE 0.4 MG/1
0.4 CAPSULE ORAL DAILY
Status: DISCONTINUED | OUTPATIENT
Start: 2021-01-21 | End: 2021-01-29 | Stop reason: HOSPADM

## 2021-01-21 RX ORDER — POTASSIUM CHLORIDE 7.45 MG/ML
10 INJECTION INTRAVENOUS PRN
Status: CANCELLED | OUTPATIENT
Start: 2021-01-21

## 2021-01-21 RX ORDER — DEXTROSE MONOHYDRATE 50 MG/ML
100 INJECTION, SOLUTION INTRAVENOUS PRN
Status: DISCONTINUED | OUTPATIENT
Start: 2021-01-21 | End: 2021-01-29 | Stop reason: HOSPADM

## 2021-01-21 RX ORDER — POLYETHYLENE GLYCOL 3350 17 G/17G
17 POWDER, FOR SOLUTION ORAL DAILY PRN
Status: DISCONTINUED | OUTPATIENT
Start: 2021-01-21 | End: 2021-01-29 | Stop reason: HOSPADM

## 2021-01-21 RX ORDER — ACETAMINOPHEN 325 MG/1
650 TABLET ORAL EVERY 6 HOURS PRN
Status: CANCELLED | OUTPATIENT
Start: 2021-01-21

## 2021-01-21 RX ORDER — ACETAMINOPHEN 325 MG/1
650 TABLET ORAL EVERY 4 HOURS PRN
Status: DISCONTINUED | OUTPATIENT
Start: 2021-01-21 | End: 2021-01-29 | Stop reason: HOSPADM

## 2021-01-21 RX ORDER — SODIUM CHLORIDE 0.9 % (FLUSH) 0.9 %
10 SYRINGE (ML) INJECTION PRN
Status: DISCONTINUED | OUTPATIENT
Start: 2021-01-21 | End: 2021-01-29 | Stop reason: HOSPADM

## 2021-01-21 RX ORDER — ONDANSETRON 2 MG/ML
4 INJECTION INTRAMUSCULAR; INTRAVENOUS EVERY 6 HOURS PRN
Status: CANCELLED | OUTPATIENT
Start: 2021-01-21

## 2021-01-21 RX ORDER — PANTOPRAZOLE SODIUM 40 MG/1
40 TABLET, DELAYED RELEASE ORAL DAILY
Status: CANCELLED | OUTPATIENT
Start: 2021-01-21

## 2021-01-21 RX ORDER — ATORVASTATIN CALCIUM 20 MG/1
20 TABLET, FILM COATED ORAL DAILY
Status: DISCONTINUED | OUTPATIENT
Start: 2021-01-21 | End: 2021-01-29 | Stop reason: HOSPADM

## 2021-01-21 RX ORDER — NICOTINE POLACRILEX 4 MG
15 LOZENGE BUCCAL PRN
Status: CANCELLED | OUTPATIENT
Start: 2021-01-21

## 2021-01-21 RX ORDER — NIFEDIPINE 60 MG/1
60 TABLET, EXTENDED RELEASE ORAL DAILY
Status: DISCONTINUED | OUTPATIENT
Start: 2021-01-21 | End: 2021-01-29 | Stop reason: HOSPADM

## 2021-01-21 RX ORDER — ASPIRIN 81 MG/1
81 TABLET ORAL DAILY
Status: CANCELLED | OUTPATIENT
Start: 2021-01-21

## 2021-01-21 RX ORDER — POLYETHYLENE GLYCOL 3350 17 G/17G
17 POWDER, FOR SOLUTION ORAL DAILY PRN
Status: CANCELLED | OUTPATIENT
Start: 2021-01-21

## 2021-01-21 RX ORDER — GLIPIZIDE 10 MG/1
10 TABLET ORAL
Refills: 3 | Status: CANCELLED | OUTPATIENT
Start: 2021-01-22

## 2021-01-21 RX ORDER — DEXTROSE MONOHYDRATE 50 MG/ML
100 INJECTION, SOLUTION INTRAVENOUS PRN
Status: CANCELLED | OUTPATIENT
Start: 2021-01-21

## 2021-01-21 RX ORDER — ATORVASTATIN CALCIUM 20 MG/1
20 TABLET, FILM COATED ORAL DAILY
Status: CANCELLED | OUTPATIENT
Start: 2021-01-21

## 2021-01-21 RX ORDER — ACETAMINOPHEN 650 MG/1
650 SUPPOSITORY RECTAL EVERY 6 HOURS PRN
Status: CANCELLED | OUTPATIENT
Start: 2021-01-21

## 2021-01-21 RX ORDER — ASPIRIN 81 MG/1
81 TABLET ORAL DAILY
Status: DISCONTINUED | OUTPATIENT
Start: 2021-01-21 | End: 2021-01-29 | Stop reason: HOSPADM

## 2021-01-21 RX ORDER — ACETAMINOPHEN 325 MG/1
650 TABLET ORAL EVERY 6 HOURS PRN
Status: DISCONTINUED | OUTPATIENT
Start: 2021-01-21 | End: 2021-01-29 | Stop reason: HOSPADM

## 2021-01-21 RX ORDER — ACETAMINOPHEN 650 MG/1
650 SUPPOSITORY RECTAL EVERY 6 HOURS PRN
Status: DISCONTINUED | OUTPATIENT
Start: 2021-01-21 | End: 2021-01-29 | Stop reason: HOSPADM

## 2021-01-21 RX ORDER — NIFEDIPINE 60 MG/1
60 TABLET, EXTENDED RELEASE ORAL DAILY
Status: CANCELLED | OUTPATIENT
Start: 2021-01-21

## 2021-01-21 RX ORDER — DEXTROSE MONOHYDRATE 25 G/50ML
25 INJECTION, SOLUTION INTRAVENOUS PRN
Status: CANCELLED | OUTPATIENT
Start: 2021-01-21

## 2021-01-21 RX ORDER — GLIPIZIDE 10 MG/1
10 TABLET ORAL
Status: DISCONTINUED | OUTPATIENT
Start: 2021-01-22 | End: 2021-01-22

## 2021-01-21 RX ORDER — POTASSIUM CHLORIDE 20 MEQ/1
40 TABLET, EXTENDED RELEASE ORAL PRN
Status: CANCELLED | OUTPATIENT
Start: 2021-01-21

## 2021-01-21 RX ORDER — POLYETHYLENE GLYCOL 3350 17 G/17G
17 POWDER, FOR SOLUTION ORAL DAILY PRN
Status: DISCONTINUED | OUTPATIENT
Start: 2021-01-21 | End: 2021-01-21

## 2021-01-21 RX ORDER — ONDANSETRON 2 MG/ML
4 INJECTION INTRAMUSCULAR; INTRAVENOUS EVERY 6 HOURS PRN
Status: DISCONTINUED | OUTPATIENT
Start: 2021-01-21 | End: 2021-01-29 | Stop reason: HOSPADM

## 2021-01-21 RX ORDER — METOPROLOL SUCCINATE 50 MG/1
100 TABLET, EXTENDED RELEASE ORAL DAILY
Status: DISCONTINUED | OUTPATIENT
Start: 2021-01-22 | End: 2021-01-29 | Stop reason: HOSPADM

## 2021-01-21 RX ORDER — PANTOPRAZOLE SODIUM 40 MG/1
40 TABLET, DELAYED RELEASE ORAL DAILY
Status: DISCONTINUED | OUTPATIENT
Start: 2021-01-21 | End: 2021-01-29 | Stop reason: HOSPADM

## 2021-01-21 RX ORDER — POTASSIUM CHLORIDE 7.45 MG/ML
10 INJECTION INTRAVENOUS PRN
Status: DISCONTINUED | OUTPATIENT
Start: 2021-01-21 | End: 2021-01-29 | Stop reason: HOSPADM

## 2021-01-21 RX ADMIN — ENOXAPARIN SODIUM 40 MG: 40 INJECTION SUBCUTANEOUS at 08:17

## 2021-01-21 RX ADMIN — VANCOMYCIN HYDROCHLORIDE 1250 MG: 1 INJECTION, POWDER, LYOPHILIZED, FOR SOLUTION INTRAVENOUS at 08:18

## 2021-01-21 RX ADMIN — PIPERACILLIN AND TAZOBACTAM 3.38 G: 3; .375 INJECTION, POWDER, LYOPHILIZED, FOR SOLUTION INTRAVENOUS at 08:16

## 2021-01-21 RX ADMIN — SODIUM CHLORIDE, PRESERVATIVE FREE 10 ML: 5 INJECTION INTRAVENOUS at 08:17

## 2021-01-21 RX ADMIN — PANTOPRAZOLE SODIUM 40 MG: 40 TABLET, DELAYED RELEASE ORAL at 17:04

## 2021-01-21 RX ADMIN — ATORVASTATIN CALCIUM 20 MG: 20 TABLET, FILM COATED ORAL at 17:04

## 2021-01-21 RX ADMIN — SODIUM CHLORIDE, PRESERVATIVE FREE 10 ML: 5 INJECTION INTRAVENOUS at 00:32

## 2021-01-21 RX ADMIN — ASPIRIN 81 MG: 81 TABLET, COATED ORAL at 17:04

## 2021-01-21 RX ADMIN — PIPERACILLIN AND TAZOBACTAM 3.38 G: 3; .375 INJECTION, POWDER, LYOPHILIZED, FOR SOLUTION INTRAVENOUS at 00:32

## 2021-01-21 RX ADMIN — FAMOTIDINE 20 MG: 10 INJECTION, SOLUTION INTRAVENOUS at 08:16

## 2021-01-21 RX ADMIN — NIFEDIPINE 60 MG: 60 TABLET, FILM COATED, EXTENDED RELEASE ORAL at 17:04

## 2021-01-21 RX ADMIN — SODIUM CHLORIDE, PRESERVATIVE FREE 10 ML: 5 INJECTION INTRAVENOUS at 22:07

## 2021-01-21 RX ADMIN — TAMSULOSIN HYDROCHLORIDE 0.4 MG: 0.4 CAPSULE ORAL at 17:04

## 2021-01-21 NOTE — PROGRESS NOTES
Pharmacy Vancomycin Consult     Vancomycin Day: 4  Current Dosing: Vancomycin pulse dosing    Temp max:  99.3    Recent Labs     01/20/21  0129 01/21/21  0455   BUN 44* 29*       Recent Labs     01/20/21  0129 01/21/21  0455   CREATININE 2.0* 1.8*       Recent Labs     01/20/21  0129 01/21/21  0455   WBC 8.7 6.8         Intake/Output Summary (Last 24 hours) at 1/21/2021 0630  Last data filed at 1/21/2021 0461  Gross per 24 hour   Intake 997.22 ml   Output 1450 ml   Net -452.78 ml     Culture Date Source Results   01/17/21 Blood x 2  No growth    01/18/21 Blood  No growth                  Ht Readings from Last 1 Encounters:   01/18/21 5' 8\" (1.727 m)        Wt Readings from Last 1 Encounters:   01/20/21 241 lb 14.4 oz (109.7 kg)         Body mass index is 36.78 kg/m². Estimated Creatinine Clearance: 40 mL/min (A) (based on SCr of 1.8 mg/dL (H)). Random level: 15.1    Assessment/Plan: Good level. Serum creatinine at baseline, CKD stage III. Will stop Vancomycin pulse dosing and initiate Vancomycin 1250 mg IV every 24 hours. Trough level ordered.      Electronically signed by Ann Corral, [facility] on 1/21/2021 at 6:30 AM

## 2021-01-21 NOTE — PLAN OF CARE
Problem: SAFETY  Goal: Free from accidental physical injury  Outcome: Ongoing   Up with 1 assist with walker

## 2021-01-21 NOTE — PROGRESS NOTES
4 Eyes Skin Assessment    Aileen Del Real is being assessed upon: Admission    I agree that Katarina Jones, along with Pricila Mendiola RN (either 2 RN's or 1 LPN and 1 RN) have performed a thorough Head to Toe Skin Assessment on the patient. ALL assessment sites listed below have been assessed. Areas assessed by both nurses:     [x]   Head, Face, and Ears   [x]   Shoulders, Back, and Chest  [x]   Arms, Elbows, and Hands   [x]   Coccyx, Sacrum, and Ischium  [x]   Legs, Feet, and Heels    Does the Patient have Skin Breakdown?  No    Ángel Prevention initiated: Yes  Wound Care Orders initiated: No    WOC nurse consulted for Pressure Injury (Stage 3,4, Unstageable, DTI, NWPT, and Complex wounds) and New or Established Ostomies: No        Primary Nurse eSignature: Maru Eid RN on 1/21/2021 at 3:36 PM      Co-Signer eSignature: Electronically signed by Eder Ramirez RN on 1/21/2021 at 3:50 PM

## 2021-01-21 NOTE — PLAN OF CARE
[address] TREATMENT PLAN      Lan Stroud    : 1940  Acct #: [de-identified]  MRN: [id number]   PHYSICIAN:  Cristofer Chatman MD  Primary Problem    Patient Active Problem List   Diagnosis    Diverticulitis of large intestine with perforation without bleeding    Generalized abdominal pain    Colonic diverticular abscess    Bilateral carotid artery stenosis    Atherosclerosis of native artery of both lower extremities with intermittent claudication (HCC)    Carotid stenosis, asymptomatic, left    Primary osteoarthritis of left knee    Arthritis of knee    Essential hypertension    Pure hypercholesterolemia    Slow transit constipation    Iron deficiency anemia    GERD (gastroesophageal reflux disease)    Acute liver failure without hepatic coma    CHF (congestive heart failure) (HCC)    Bilateral pleural effusion    Palliative care patient    Shock liver    Acute renal failure (HCC)    BPH associated with nocturia    Bleeding diathesis ([address]    Epistaxis    Acute blood loss anemia    Melena    Hypocalcemia    Pneumonia due to infectious organism    Bladder cancer ([address]    Postoperative pain    History of bladder cancer    Severe sepsis (HCC)    Acute on chronic respiratory failure (HCC)    UTI (urinary tract infection)    Acute on chronic kidney failure (HCC)    Hypoxemia    Metabolic acidosis    Acidosis, metabolic, with respiratory acidosis    Acute respiratory failure (HCC)    Type 2 diabetes mellitus with complication, without long-term current use of insulin (HCC)    Weakness    Other dysphagia    Low back pain       Rehabilitation Diagnosis:     Acute respiratory failure ([address] [J96.00]       ADMIT DATE:2021   CARE PLAN     NURSING:  Lan Stroud while on this unit will:     [x] Be continent of bowel and bladder      [x] Have an adequate number of bowel movements   [] Urinate with no urinary retention >300ml in bladder [] Complete bladder protocol with carter removal   [x] Maintain O2 SATs at 90%   [x] Have pain managed while on ARU        [] Be pain free by discharge    [x] Have no skin breakdown while on ARU   [] Have improved skin integrity via wound measurements   [] Have no signs/symptoms of infection at the wound site  [x] Be free from injury during hospitalization   [x] Complete education with patient/family with understanding demonstrated for:  [] Adjustment   [] Other:   Nursing interventions may include bowel/bladder training, education for medical assistive devices, medication education, O2 saturation management, energy conservation, stress management techniques, fall prevention, alarms protocol, seating and positioning, skin/wound care, pressure relief instruction,dressing changes,  infection protection, DVT prophylaxis, and/or assistance with in room safety with transfers to bed, toilet, wheelchair, shower as well as bathroom activities and hygiene.      Patient/caregiver education for:   [x] Disease/sustained injury/management      [x] Medication Use   [] Surgical intervention   [x] Safety   [x] Body mechanics and or joint protection   [x] Health maintenance       IRF-RHONDA  Bladder and Bowel Continence  Bladder Continence: Always continent  Bowel Continence: Always continent       PHYSICAL THERAPY:  Goals:                  Short term goals  Time Frame for Short term goals: 1 TO 2 WEEKS  Short term goal 1: INDEP ON BED MOBILITY  Short term goal 2: SBA TRANSFERS  Short term goal 3: SBA  FEET W/OUT W/C FOLLOW  Short term goal 4: INDEP W/C PROPULSION 150 FEET  Short term goal 5: CGA 4 STEPS 2 RAILS            Long term goals  Time Frame for Long term goals : 2 TO 3 WEEKS  Long term goal 1: INDEP TRANSFERS  Long term goal 2: INDEP AMB WITHOUT  FEET  Long term goal 3: INDEP 4 STEPS  Long term goal 4: INDEP CAR TRANSFER  Long term goal 5: INDPE HEP These goals were reviewed with this patient at the time of assessment and Luis Jha is in agreement.      Plan of Care: Frequency:  [x] 5 days per week, 90 minutes per day                             []  5 days per week, 60 minutes per day               Current Treatment Recommendations: Strengthening, Balance Training, Functional Mobility Training, Transfer Training, Endurance Training, Wheelchair Mobility Training, Gait Training, Stair training, Home Exercise Program, Safety Education & Training, Patient/Caregiver Education & Training, Equipment Evaluation, Education, & procurement    IRF-RHONDA  Roll Left and Right  Assistance Needed: Supervision or touching assistance  CARE Score: 4  Discharge Goal: Independent  Sit to Lying  Assistance Needed: Supervision or touching assistance  CARE Score: 4  Discharge Goal: Independent  Lying to Sitting on Side of Bed  Assistance Needed: Supervision or touching assistance  CARE Score: 4  Discharge Goal: Independent  Sit to Stand  Assistance Needed: Supervision or touching assistance  CARE Score: 4  Discharge Goal: Independent  Chair/Bed-to-Chair Transfer  Assistance Needed: Supervision or touching assistance  CARE Score: 4  Discharge Goal: Independent  Car Transfer  Reason if not Attempted: Not attempted due to medical condition or safety concerns  CARE Score: 88  Discharge Goal: Independent  Walk 10 Feet  Assistance Needed: Supervision or touching assistance  CARE Score: 4  Discharge Goal: Independent  Walk 50 Feet with Two Turns  Assistance Needed: Supervision or touching assistance  CARE Score: 4  Discharge Goal: Independent  Walk 150 Feet  Assistance Needed: Supervision or touching assistance  CARE Score: 4  Discharge Goal: Independent  Walking 10 Feet on Uneven Surfaces  Reason if not Attempted: Not attempted due to medical condition or safety concerns  CARE Score: 88  Discharge Goal: Not Attempted  1 Step (Curb) Reason if not Attempted: Not attempted due to medical condition or safety concerns  CARE Score: 88  Discharge Goal: Independent  4 Steps  Reason if not Attempted: Not attempted due to medical condition or safety concerns  CARE Score: 88  Discharge Goal: Independent  12 Steps  Reason if not Attempted: Not attempted due to medical condition or safety concerns  CARE Score: 88  Discharge Goal: Supervision or touching assistance  Wheel 50 Feet with Two Turns  Assistance Needed: Supervision or touching assistance  CARE Score: 4  Discharge Goal: Independent  Wheel 150 Feet  Assistance Needed: Supervision or touching assistance  CARE Score: 4  Discharge Goal: Independent    OCCUPATIONAL THERAPY:  Goals:             Short term goals  Time Frame for Short term goals: 1 week  Short term goal 1: complete overall toileting with supervision  Short term goal 2: complete LB dressing with supervision  Short term goal 3: complete overall bathing with supervision  Short term goal 4: complete ambulatory home making task with supervision  Short term goal 5: complete 1-2 handed standing level task for 3 mins with supervision  Short term goal 6: complete HEP x 5 occasions in order to improve strength and endurance for ADLs :  Long term goals  Time Frame for Long term goals : 10-14 days  Long term goal 1: complete overall bathing with modified independence  Long term goal 2: complete overall toileting with modified independence  Long term goal 3: complete simple ambulatory home making task with modified independence  Long term goal 4: complete overall dressing with modified independence  Long term goal 5: complete HEP with independence  Long term goals 6: pt/family verbalize DME :    These goals were reviewed with this patient at the time of assessment and Rochelle Gonzalez is in agreement    Plan of Care: Frequency:   [x] 5 days per week, 90 minutes per day     [] 5 days per week, 60 minutes per day                Plan Current Treatment Recommendations: Self-Care / ADL, Safety Education & Training, Endurance Training, Strengthening, Functional Mobility Training, Balance Training, Equipment Evaluation, Education, & procurement, Patient/Caregiver Education & Training, Home Management Training            IRF-RHONDA  Eating  Assistance Needed: Supervision or touching assistance  CARE Score: 4  Discharge Goal: Independent  Oral Hygiene  Assistance Needed: Setup or clean-up assistance  CARE Score: 5  Discharge Goal: Independent  Toileting Hygiene  Assistance Needed: Supervision or touching assistance  CARE Score: 4  Discharge Goal: Independent  Shower/Bathe Self  Assistance Needed: Supervision or touching assistance  CARE Score: 4  Discharge Goal: Independent  Upper Body Dressing  Assistance Needed: Setup or clean-up assistance  CARE Score: 5  Discharge Goal: Independent  Lower Body Dressing  Assistance Needed: Supervision or touching assistance  CARE Score: 4  Discharge Goal: Independent  Putting On/Taking Off Footwear  Assistance Needed: Supervision or touching assistance  CARE Score: 4  Toilet Transfer  Assistance Needed: Supervision or touching assistance  Comment: RW  CARE Score: 4  Discharge Goal: Independent  Picking Up Object  Reason if not Attempted: Not attempted due to medical condition or safety concerns  CARE Score: 88  Discharge Goal: Independent  Treatments may include IADL retraining, strengthening, safety education and training, patient/caregiver education and training, equipment evaluation/ training/procurement, neuromuscular reeducation, wheelchair mobility training.     SPEECH THERAPY:   Plan of Care and Goals:   LTG    FIM Goals: Comprehension: GOAL: Comprehension: 7  Expression: GOAL: Expression: 7  Social: GOAL: Social Interaction: 7  Problem Solving: GOAL: Problem Solvin  Memory: GOAL: Memory: 7                                                IRF-RHONDA  Hearing, Speech, and Vision Expression of Ideas and Wants: Without difficulty  Understanding Verbal and Non-Verbal Content: Understands  Cognitive Patterns  Cognitive Pattern Assessment Used: BIMS  Repetition of Three Words (First Attempt): 3  Temporal Orientation: Year: Correct  Temporal Orientation: Month: Accurate within 5 days  Temporal Orientation: Day: Correct  Able to recall \"sock: Yes, no cue required  Able to recall \"blue\": Yes, no cue required  Able to recall \"bed\": No, could not recall  BIMS Summary Score: 13          Plan of Care:  Frequency:   [] 5 days per week, 60 minutes per day                            [x] Not appropriate for Speech Therapy  Treatments may include speech/language/communication therapy, cognitive training, group therapy, education, and/or dysphagia therapy based on the above goals. These goals were reviewed with this patient at the time of assessment and Lucrecia Mello is in agreement. CASE MANAGEMENT:  Goals:   Assist patient/family with discharge planning, patient/family counseling,  and coordination with insurance during ARU stay. Patient Goals: recover, regular diet, regain strength, endurance and strength to be independent               Activities Prior to Admit:      Active : Yes     Occupation: Retired  Leisure & Hobbies: yardwork, [address] [place] [address], repair, small automobile repairs, painting         Lucrecia Mello will be seen a minimum of 3 hours of therapy per day/a minimum of 5 out of 7 days per week. [] In this rare instance due to the nature of this patient's medical involvement, this patient will be seen 15 hours per week (900 minutes within a 7 day period). 9. Back pain/osteoarthritis-tylenol PRN  10. Acute on chronic renal failure-monitor  11. History of ETOH use  12.  PT/OT/Speech             Case Mgmt: Electronically signed by FELIPA Rodriguez on 1/22/2021 at 2:14 PM      OT:Electronically signed by Olivier Cancino OT on 1/22/2021 at 4:30 PM    PT: Electronically signed by Daniella Mckay PT on 1/22/21 at 11:33 AM CST    RN: Electronically signed by Janki Pearl RN on 1/21/21 at 3:05 PM CST    ST: Electronically Signed By:  Twyla Chicas M.S., CCC-SLP  1/22/2021,12:44 PM.

## 2021-01-21 NOTE — PROGRESS NOTES
Speech Language Pathology  Facility/Department: [facility] 5 SURG SERVICES  SWALLOW THERAPY     NAME: Kat Maier  : 1940  MRN: [id number]    ADMISSION DATE: 2021  ADMITTING DIAGNOSIS: has Diverticulitis of large intestine with perforation without bleeding; Generalized abdominal pain; Colonic diverticular abscess; Bilateral carotid artery stenosis; Atherosclerosis of native artery of both lower extremities with intermittent claudication ([address] Carotid stenosis, asymptomatic, left; Primary osteoarthritis of left knee; Arthritis of knee; Essential hypertension; Pure hypercholesterolemia; Slow transit constipation; Iron deficiency anemia; GERD (gastroesophageal reflux disease); Acute liver failure without hepatic coma; CHF (congestive heart failure) ([address] Bilateral pleural effusion; Palliative care patient; Shock liver; Acute renal failure (HCC); BPH associated with nocturia; Bleeding diathesis ([address] Epistaxis; Acute blood loss anemia; Melena; Hypocalcemia; Pneumonia due to infectious organism; Bladder cancer ([address] Postoperative pain; History of bladder cancer; Severe sepsis ([address] Acute on chronic respiratory failure ([address] UTI (urinary tract infection); Acute on chronic kidney failure ([address] Hypoxemia; Metabolic acidosis; and Acidosis, metabolic, with respiratory acidosis on their problem list.    Date of Treat: 2021  Evaluating Therapist: Raffy Jacobson    Current Diet level:  Soft and bite sized consistency with nectar thick liquids    Reason for Referral  Kat Maier was referred for a bedside swallow evaluation to assess the efficiency of his swallow function, identify signs and symptoms of aspiration and make recommendations regarding safe dietary consistencies, effective compensatory strategies, and safe eating environment.     Impression Monitored patient's swallowing function S/P extubation 1/19. Patient exhibits decreased oral prep of more solid consistencies, fast oral transit and suspected swallow delay with thin liquids, and sluggish, mild-moderately decreased laryngeal elevation for swallow airway protection. Even so, no outward S/S penetration/aspiration was noted with any soft and bite sized consistency presentation, nectar thick liquid presentation, or thin H2O trial presented during treatment session this date.     At this time, would recommend continuation soft and bite sized consistency and nectar thick liquids. Recommend meds whole in pudding/applesauce. If patient receives mouth care prior to intake, okay for ice chips and small sips thin H2O IN BETWEEN MEALS for comfort. Will continue to follow.     Treatment Plan  Requires SLP Intervention: Yes     Recommended Diet and Intervention  Diet Solids Recommendation: Soft and bite sized  Liquid Consistency Recommendation: Nectar thick   Recommended Form of Meds: Meds whole in puree as able  Therapeutic Interventions: Patient/Family education;Diet tolerance monitoring; Therapeutic PO trials with SLP     Compensatory Swallowing Strategies  Compensatory Swallowing Strategies: Upright as possible for all oral intake;Small bites/sips;Eat/Feed slowly; Alternate solids and liquids; Remain upright for 30-45 minutes after meals     Treatment/Goals  Timeframe for Short-term Goals: 1x/day for 3 days   Goal 1: Patient will tolerate soft and bite sized consistency and nectar thick liquids with min S/S penetration/aspiration during PO intake. Goal 2: Patient staff will follow swallow safety recommendations to decrease risk of penetration/aspiration during PO intake. Goal 3: Re-assessment of swallow function for potential diet upgrade. Goal 4: Monitor speech production. Goal 5: Cognitive-linguistic eval      General  Chart Reviewed: Yes  Behavior/Cognition: Alert; Cooperative  O2 Device: Nasal Cannula Communication Observation: (Monitored patient's speech production. Patient continues to exhibit mildly slowed, decreased lingual movements during verbalizations. SLP still ranked functional intelligibility of speech for unfamiliar listeners at 100% in utterances with background noise present.)  Follows Directions: Simple   Dentition: Lower dentures noted. Patient Positioning: Upright in bed  Consistencies Administered: Soft and bite sized; Nectar - cup; Thin - cup      Oral Motor Examination   Labial ROM: (Decreased, on the right, during labial retraction trials and labial protrusion trials.)  Labial Strength: (Adequate during labial compression trials.)  Labial Coordination: (Adequate movements were noted.)  Lingual ROM: (Adequate during lingual extension trials with full point achieved; decreased during lingual elevation trials without use of accessory jaw movement; adequate movements noted bilaterally.)  Lingual Symmetry: (Deviation was noted, to the left, during lingual extension trials.)  Lingual Strength: (Decreased with mild fasciculations noted during lingual extension trials.)  Lingual Coordination: (Slowed movements were noted.)     Monitored patient's swallowing function with the following observations noted:     Oral Phase  Mastication: Soft and bite sized (Patient exhibited decreased rotary jaw movement during oral prep of soft and bite sized consistency presentations administered independently.)  Suspected Premature Bolus Loss:  Thin - cup (Patient exhibited fast oral transit of thin H2O trials presented independently via cup.)  Decreased Oral Transit: Soft and bite sized (Min-moderate oral cavity residue was noted post swallows; residue cleared from the mouth with additional dry swallows.)

## 2021-01-21 NOTE — PROGRESS NOTES
Physical Therapy  Name: Laurel Moore  MRN:  [id number]  Date of service:  1/21/2021 01/21/21 1224   Subjective   Subjective Pt states he does not know if he will be able to stand up or not but is willing to try. Bed Mobility   Supine to Sit Minimal assistance   Transfers   Sit to Stand Minimal Assistance   Stand to sit Minimal Assistance;Contact guard assistance   Comment pt stood beside bed and marched in place before attempting to step over to recliner. Ambulation   Ambulation? Yes   Ambulation 1   Device [address] guard assistance;Minimal assistance   Distance 3'   Comments Pt was able to step over to recliner without any difficulty. Short term goals   Time Frame for Short term goals 14 DAYS   Short term goal 1 BED MOB MOD IND   Short term goal 2 TRANSFERS SUPERVISION   Short term goal 3 ' AAD (if needed) SUPERVISION   Conditions Requiring Skilled Therapeutic Intervention   Body structures, Functions, Activity limitations Decreased functional mobility ; Decreased ADL status; Decreased ROM; Decreased strength;Decreased balance;Decreased posture;Decreased safe awareness;Decreased endurance   Assessment Pt feeling stronger today and able to take some steps. Activity Tolerance   Activity Tolerance Patient Tolerated treatment well   Safety Devices   Type of devices Left in chair;Call light within reach; Chair alarm in place     Electronically signed by Stefanie Carvalho PTA on 1/21/2021 at 12:27 PM

## 2021-01-21 NOTE — DISCHARGE SUMMARY
Discharge Summary - Transfer to inpatient rehabilitation      Ashley Christina  :  1940  MRN:  [id number]    Admit date:  2021  Discharge date:      Admitting Physician:  Alexia Ceballos MD    Advance Directive: Full Code    Consults: none    Primary Care Physician:  Amarilis Saenz MD    Discharge Diagnoses:  Principal Problem:    Severe sepsis [facility])  Active Problems:    Essential hypertension    Acute liver failure without hepatic coma    CHF (congestive heart failure) [address]    Palliative care patient    Acute renal failure ([facility]    BPH associated with nocturia    Pneumonia due to infectious organism    Bladder cancer ([facility]    History of bladder cancer    Acute on chronic respiratory failure (HCC)    UTI (urinary tract infection)    Acute on chronic kidney failure (HCC)    Hypoxemia    Metabolic acidosis    Acidosis, metabolic, with respiratory acidosis  Resolved Problems:    * No resolved hospital problems. *      Significant Diagnostic Studies:   Ct Head Wo Contrast    Result Date: 2021  Examination. CT HEAD WO CONTRAST 2021 10:14 PM History: Altered mental status. DLP: 886 mGycm. The CT scan of the head is performed without intravenous contrast enhancement. The images are acquired in axial plane with subsequent reconstruction in coronal and sagittal planes. The comparison is made with the previous study dated 8/15/2018. There is no evidence of a mass or midline shift. There is no evidence of intracranial hemorrhage or hematoma. Moderately prominent ventricles, basal cistern and the cortical sulci are age-appropriate. There are scattered areas of chronic white matter ischemia in the centrum semiovale bilaterally, similar to the previous study. The gray-white matter differentiation is maintained. A partially empty sella turcica is seen. The images reviewed in bone window show no acute bony abnormality. Visualized paranasal sinuses and mastoid air cells. No acute intracranial abnormality. The above study was initially reviewed and reported by stat rads. I do not find any discrepancies. Signed by Dr Kenya Arriola on 1/18/2021 6:47 AM    Xr Chest Portable    Result Date: 1/18/2021  EXAMINATION:  XR CHEST PORTABLE  1/18/2021 7:14 AM HISTORY: NG tube placement. Shortness of air. COMPARISON: 1/18/2021 at 0139 hours. FINDINGS:  There is hypoventilation. The patient is intubated. An NG tube extends to the stomach. Bilateral infiltrates are unchanged. Heart size upper limits of normal.    1. New NG tube in good position. Patient remains intubated. 2. Bilateral infiltrates without significant change. Signed by Dr Tia Edge on 1/18/2021 7:16 AM    Xr Chest Portable    Result Date: 1/18/2021  EXAMINATION:  XR CHEST PORTABLE  1/18/2021 7:13 AM HISTORY: Shortness of breath. COMPARISON: 7/31/2020. FINDINGS:  There is hypoventilation. There is perihilar infiltrate. There is more dense infiltrate at the right lung base. No significant pleural effusion is seen. Heart size is upper limits of normal. The thoracic aorta is atheromatous. 1. Hypoventilation. 2. Bilateral infiltrates are likely infectious/inflammatory. Signed by Dr Tia Edge on 1/18/2021 7:14 AM    Xr Chest Portable    Result Date: 1/18/2021  EXAMINATION:  XR CHEST PORTABLE  1/18/2021 7:12 AM HISTORY: Patient intubated. Bilateral infiltrates. COMPARISON: 1/17/2021 at 2301 hours. FINDINGS:  The patient is now intubated with endotracheal tube tip at the T4 level. There is hypoventilation. There are perihilar opacities with more dense consolidation at the right lung base. Heart size is upper limits of normal. No acute bony abnormality is seen. 1. Endotracheal tube tip at the T4 level. 2. Bilateral infiltrates without significant change.  Signed by Dr Tia Edge on 1/18/2021 7:13 AM      Pertinent Labs:   CBC:   Recent Labs     01/19/21  0132 01/20/21  0129 01/21/21  0455   WBC 9.3 8.7 6.8 HGB 12.9* 12.9* 12.5*    164 169     BMP:    Recent Labs     01/19/21  0132 01/19/21  1313 01/20/21  0129 01/21/21  0455     --  141 144   K 3.0* 3.4 3.9 4.0     --  105 105   CO2 25  --  27 28   BUN 55*  --  44* 29*   CREATININE 2.1*  --  2.0* 1.8*   GLUCOSE 112*  --  89 84     INR: No results for input(s): INR in the last 72 hours. ABGs:No results for input(s): PH, PO2, PCO2, HCO3, BE, O2SAT in the last 72 hours. Lactic Acid:  Recent Labs     01/19/21 0132   LACTA 1.3       Procedures: Intubation/mechanical ventilation  Hospital Course: 60-year-old male with a past medical history of bladder cancer, CKD stage IIIb, COPD not on home oxygen, former smoker who presented to the hospital for lethargy and confusion noted to have bilateral infiltrates currently being treated for pneumonia also with questionable concomitant UTI noted to have acute respiratory failure with hypercapnia in the emergency department requiring intubation and he was transitioned to MICU for further care. Patient was successfully liberated from the ventilator and has been transferred back to routine medical floors for further management. Patient was also noted to have lactic acidosis and acute on chronic renal failure both of which have resolved. Patient has received 4 days of vancomycin and Zosyn while inpatient with all cultures currently negative to date. Patient has been accepted to inpatient rehabilitation and is hemodynamically stable for transition and inpatient rehabilitation today to follow-up with PCP as an outpatient.     Physical Exam:  Vitals: BP (!) 141/77   Pulse 79   Temp 99.2 °F (37.3 °C) (Temporal)   Resp 18   Ht 5' 8\" (1.727 m)   Wt 241 lb 14.4 oz (109.7 kg)   SpO2 96%   BMI 36.78 kg/m²   24HR INTAKE/OUTPUT:      Intake/Output Summary (Last 24 hours) at 1/21/2021 1132  Last data filed at 1/21/2021 0903  Gross per 24 hour   Intake 874.3 ml   Output 1050 ml   Net -175.7 ml General appearance: alert and cooperative with exam  HEENT: AT/NC  Lungs: no increased work of breathing, BLAE, coarse breath sounds b/l  Heart: RRR  Abdomen: BS+, soft, NT  Extremities: no edema, pulses+  Neurologic: Alert, gross motor function intact  Skin: warm    Discharge Medications:       Velvet Smoker   Home Medication Instructions SOD:[id number]    Printed on:01/21/21 1130   Medication Information                      aspirin 81 MG EC tablet  Take 81 mg by mouth daily             atorvastatin (LIPITOR) 20 MG tablet  Take 20 mg by mouth daily             azithromycin (ZITHROMAX) 500 MG tablet  Take 1 tablet by mouth daily for 3 days             cefdinir (OMNICEF) 300 MG capsule  Take 1 capsule by mouth 2 times daily for 7 days             glipiZIDE (GLUCOTROL XL) 10 MG extended release tablet  10 mg              metoprolol succinate (TOPROL XL) 100 MG extended release tablet  Take 100 mg by mouth daily             mirabegron (MYRBETRIQ) 25 MG TB24  Take 1 tablet by mouth daily             NIFEdipine (PROCARDIA XL) 60 MG extended release tablet  Take 60 mg by mouth daily             pantoprazole (PROTONIX) 40 MG tablet  Take 40 mg by mouth daily             tamsulosin (FLOMAX) 0.4 MG capsule  TK 1 C PO QD                 Discharge Instructions: Follow up with Aston Baca MD in 7 days. Take medications as directed. Resume activity as tolerated. Diet: DIET DYSPHAGIA SOFT AND BITE-SIZED; Mildly Thick (Nectar)     Disposition: Patient is medically stable and will be discharged to inpatient rehabilitation. Time spent on discharge 35 minutes.     Signed:  Bk Beasley MD 1/21/2021 11:32 AM

## 2021-01-22 PROBLEM — M54.50 LOW BACK PAIN: Status: ACTIVE | Noted: 2021-01-22

## 2021-01-22 LAB
ALBUMIN SERPL-MCNC: 3 G/DL (ref 3.5–5.2)
ALP BLD-CCNC: 91 U/L (ref 40–130)
ALT SERPL-CCNC: 24 U/L (ref 5–41)
ANION GAP SERPL CALCULATED.3IONS-SCNC: 11 MMOL/L (ref 7–19)
AST SERPL-CCNC: 25 U/L (ref 5–40)
BILIRUB SERPL-MCNC: 0.7 MG/DL (ref 0.2–1.2)
BUN BLDV-MCNC: 21 MG/DL (ref 8–23)
CALCIUM SERPL-MCNC: 8.5 MG/DL (ref 8.8–10.2)
CHLORIDE BLD-SCNC: 104 MMOL/L (ref 98–111)
CO2: 27 MMOL/L (ref 22–29)
CREAT SERPL-MCNC: 1.6 MG/DL (ref 0.5–1.2)
GFR AFRICAN AMERICAN: 51
GFR NON-AFRICAN AMERICAN: 42
GLUCOSE BLD-MCNC: 120 MG/DL (ref 70–99)
GLUCOSE BLD-MCNC: 48 MG/DL (ref 70–99)
GLUCOSE BLD-MCNC: 60 MG/DL (ref 70–99)
GLUCOSE BLD-MCNC: 60 MG/DL (ref 70–99)
GLUCOSE BLD-MCNC: 77 MG/DL (ref 70–99)
GLUCOSE BLD-MCNC: 97 MG/DL (ref 74–109)
HCT VFR BLD CALC: 41.4 % (ref 42–52)
HEMOGLOBIN: 12.5 G/DL (ref 14–18)
MCH RBC QN AUTO: 28.1 PG (ref 27–31)
MCHC RBC AUTO-ENTMCNC: 30.2 G/DL (ref 33–37)
MCV RBC AUTO: 93 FL (ref 80–94)
PDW BLD-RTO: 13.6 % (ref 11.5–14.5)
PERFORMED ON: ABNORMAL
PERFORMED ON: NORMAL
PLATELET # BLD: 150 K/UL (ref 130–400)
PMV BLD AUTO: 11.2 FL (ref 9.4–12.4)
POTASSIUM SERPL-SCNC: 3.6 MMOL/L (ref 3.5–5)
PREALBUMIN: 11 MG/DL (ref 20–40)
RBC # BLD: 4.45 M/UL (ref 4.7–6.1)
SODIUM BLD-SCNC: 142 MMOL/L (ref 136–145)
TOTAL PROTEIN: 5.7 G/DL (ref 6.6–8.7)
WBC # BLD: 7.4 K/UL (ref 4.8–10.8)

## 2021-01-22 PROCEDURE — 97165 OT EVAL LOW COMPLEX 30 MIN: CPT

## 2021-01-22 PROCEDURE — 36415 COLL VENOUS BLD VENIPUNCTURE: CPT

## 2021-01-22 PROCEDURE — 6370000000 HC RX 637 (ALT 250 FOR IP): Performed by: PSYCHIATRY & NEUROLOGY

## 2021-01-22 PROCEDURE — 97535 SELF CARE MNGMENT TRAINING: CPT

## 2021-01-22 PROCEDURE — 6360000002 HC RX W HCPCS: Performed by: PSYCHIATRY & NEUROLOGY

## 2021-01-22 PROCEDURE — 1180000000 HC REHAB R&B

## 2021-01-22 PROCEDURE — 82947 ASSAY GLUCOSE BLOOD QUANT: CPT

## 2021-01-22 PROCEDURE — 2580000003 HC RX 258: Performed by: INTERNAL MEDICINE

## 2021-01-22 PROCEDURE — 97110 THERAPEUTIC EXERCISES: CPT

## 2021-01-22 PROCEDURE — 6370000000 HC RX 637 (ALT 250 FOR IP): Performed by: INTERNAL MEDICINE

## 2021-01-22 PROCEDURE — 97161 PT EVAL LOW COMPLEX 20 MIN: CPT

## 2021-01-22 PROCEDURE — 85027 COMPLETE CBC AUTOMATED: CPT

## 2021-01-22 PROCEDURE — 2500000003 HC RX 250 WO HCPCS: Performed by: INTERNAL MEDICINE

## 2021-01-22 PROCEDURE — 96125 COGNITIVE TEST BY HC PRO: CPT

## 2021-01-22 PROCEDURE — 84134 ASSAY OF PREALBUMIN: CPT

## 2021-01-22 PROCEDURE — 97116 GAIT TRAINING THERAPY: CPT

## 2021-01-22 PROCEDURE — 92610 EVALUATE SWALLOWING FUNCTION: CPT

## 2021-01-22 PROCEDURE — 99223 1ST HOSP IP/OBS HIGH 75: CPT | Performed by: PSYCHIATRY & NEUROLOGY

## 2021-01-22 PROCEDURE — 80053 COMPREHEN METABOLIC PANEL: CPT

## 2021-01-22 RX ORDER — GLIPIZIDE 5 MG/1
5 TABLET ORAL
Status: DISCONTINUED | OUTPATIENT
Start: 2021-01-23 | End: 2021-01-29 | Stop reason: HOSPADM

## 2021-01-22 RX ORDER — AZITHROMYCIN 250 MG/1
500 TABLET, FILM COATED ORAL DAILY
Status: COMPLETED | OUTPATIENT
Start: 2021-01-22 | End: 2021-01-24

## 2021-01-22 RX ORDER — TROSPIUM CHLORIDE 20 MG/1
20 TABLET, FILM COATED ORAL NIGHTLY
Status: DISCONTINUED | OUTPATIENT
Start: 2021-01-22 | End: 2021-01-29 | Stop reason: HOSPADM

## 2021-01-22 RX ADMIN — ENOXAPARIN SODIUM 40 MG: 40 INJECTION SUBCUTANEOUS at 08:09

## 2021-01-22 RX ADMIN — ASPIRIN 81 MG: 81 TABLET, COATED ORAL at 08:10

## 2021-01-22 RX ADMIN — ATORVASTATIN CALCIUM 20 MG: 20 TABLET, FILM COATED ORAL at 08:10

## 2021-01-22 RX ADMIN — AZITHROMYCIN MONOHYDRATE 500 MG: 250 TABLET ORAL at 20:40

## 2021-01-22 RX ADMIN — FAMOTIDINE 20 MG: 10 INJECTION, SOLUTION INTRAVENOUS at 08:10

## 2021-01-22 RX ADMIN — PANTOPRAZOLE SODIUM 40 MG: 40 TABLET, DELAYED RELEASE ORAL at 08:10

## 2021-01-22 RX ADMIN — TAMSULOSIN HYDROCHLORIDE 0.4 MG: 0.4 CAPSULE ORAL at 08:10

## 2021-01-22 RX ADMIN — SODIUM CHLORIDE, PRESERVATIVE FREE 10 ML: 5 INJECTION INTRAVENOUS at 08:10

## 2021-01-22 RX ADMIN — SODIUM CHLORIDE, PRESERVATIVE FREE 10 ML: 5 INJECTION INTRAVENOUS at 20:41

## 2021-01-22 RX ADMIN — METOPROLOL SUCCINATE 100 MG: 50 TABLET, EXTENDED RELEASE ORAL at 08:10

## 2021-01-22 RX ADMIN — GLIPIZIDE 10 MG: 10 TABLET ORAL at 05:48

## 2021-01-22 RX ADMIN — NIFEDIPINE 60 MG: 60 TABLET, FILM COATED, EXTENDED RELEASE ORAL at 08:10

## 2021-01-22 RX ADMIN — TROSPIUM CHLORIDE 20 MG: 20 TABLET, FILM COATED ORAL at 20:40

## 2021-01-22 ASSESSMENT — PAIN DESCRIPTION - PAIN TYPE: TYPE: CHRONIC PAIN

## 2021-01-22 ASSESSMENT — PAIN SCALES - GENERAL: PAINLEVEL_OUTOF10: 4

## 2021-01-22 NOTE — PROGRESS NOTES
Per physician: This 79 y.o. male  with a past medical history of bladder cancer, HTN, CKD stage III, PVD, hyperlipidemia,CAD,COPD not on home oxygen,ETOH use drinks 2 glasses of wine every night and former smoker. He presented to [facility] ER on 1/17/21 with lethargy and confusion. He was found to be Septic with acute respiratory failure with hypercapnia and bilateral pneumonia. He was intubated in ER and admitted to ICU under the hospitalist service. He was also found to have ANA and possible UTI. He was placed on IV  vancomycin and Zosyn, which he has now been on for 4 days with all cultures currently negative to date. Patient was able to extubated on 1/19/21 and moved out of ICU on 1/20/21. He is still requiring oxygen at 3 l/m nc. He was evaluated by SPT for swallow and placed on a mechanical soft diet with nectar thick liquids. SPT also completed a cognitive eval. He was found to have  functional cognitive skills as evidenced by the Mini-Mental State Exam. Pt has minimal deficits with processing speed. Pt also has mild word-finding deficits in conversation that are resolved with a short response latency. Pt was observed with speech errors occasionally in spontaneous speech utterances. Pt did self-correct errors 1/2x. Sepsis and ANA have now resolved. He is participating in both PT/OT. He is felt to need a stay on Rehab to work towards his goal of returning home with his wife. He is now felt ready to start the Rehab program.         RECENT RESULTS  CT OF HEAD/MRI:  Impression   No acute intracranial abnormality. The above study was initially reviewed and reported by stat rads. I do   not find any discrepancies.    Signed by Dr Jose Elias Rockwell on 1/18/2021 6:47 AM           Pain:  Pain Assessment  Pain Assessment: 0-10  Pain Level: 0  Patient's Stated Pain Goal: No pain    Assessment: Diagnosis: The patient completed the Cognitive Linguistic Quick Test this date. He exhibited mild deficits in executive function and visuospatial skills. He scored within normal limits in memory and language. He exhibited moderate deficits in attention. His overall score on the CLQT was mild. No deficits in voicing or motor speech. He is not recommended for speech therapy services at this time as attention can be addressed during occupational and physical therapy sessions. Recommendations:  Requires SLP Intervention: No  Duration/Frequency of Treatment: Evaluation only. No treatment is warrented. D/C Recommendations: To be determined       Objective: Auditory Comprehension  Yes/No Questions: [facility])  One Step Basic Commands: (WFL)  L/R Discrimination: (WFL)    Reading Comprehension  Reading Status: Within functional limits    Expression  Primary Mode of Expression: Verbal    Verbal Expression  Initiation: (WNL)  Repetition: (WNL)  Automatic Speech: (WNL)  Confrontation: (WNL)  Divergent: (WNL)  Conversation: (WNL)    Written Expression  Dominant Hand: Right  Written Expression: Within Functional Limits    Motor Speech  Motor Speech:  Within Functional Limits    Pragmatics/Social Functioning  Pragmatics: Within functional limits    Cognition:   Orientation  Overall Orientation Status: Within Normal Limits  Attention  Attention: Within Functional Limits  Memory  Short-term Memory: [facility])  Working Memory: [facility])  Problem Solving  Problem Solving: Within Functional Limits  Numeric Reasoning  Numeric Reasoning: Within Functional Limits  Abstract Reasoning  Abstract Reasoning: Within Functional Limits    Additional Assessments: The Cognitive Linguistic Quick Test [facility]) was developed for use with adults with acquired neurological dysfunction, ages 18-89. This individually administered test is designed to gain information about cognitive-linguistic domains, a total composite severity rating and a clock drawing severity rating. The CLQT consists of ten tasks: Personal facts, Symbol cancellation, Confrontation naming, Clock drawing, Story retelling, Symbol trails, Generative naming, Design memory, Mazes, and Design generation. The patient scored a 3.2 out of 4.0 possible points which is in the mild scoring severity range. CLQT  Cognitive Domain Scoring Range Score Severity   Attention 99-40 63 Moderate   Memory 185-141 163 WNL   Executive Function 18-14 14 Mild   Language 37-28 30 WNL   Visuospatial 61-37 45 Mild   Composite 3.4-2.5 3.2 Mild         Prognosis:  Good    Education:  Reviewed assessment results.               1/22/2021 1:49 PM     Electronically Signed By:  Suzette Ortiz M.S. CCC-SLP  1/22/2021,1:54 PM.

## 2021-01-22 NOTE — PROGRESS NOTES
Physical Therapy EVALUATION Note  DATE:  2021  NAME:  Barrett Dolan  :  1940  (79 y.o.,male)  MRN:  [id number]    HEIGHT:  Height: 6' (182.9 cm)  WEIGHT:  Weight: 244 lb 1.6 oz (110.7 kg)    PATIENT DIAGNOSIS(ES):    Diagnosis: ACUTE RESP FAILURE VENTILATOR DEPENDENT    Additional Pertinent Hx: HTN, ACUTE KIDNEY FAILURE, PNEUMONIA, GERD, PVD, OA L KNEE, ACUTE LIVER FAILURE WITH COMA  RESTRICTIONS/PRECAUTIONS:    Restrictions/Precautions  Restrictions/Precautions: Fall Risk, Swallowing - Thickened Liquids     OVERALL  ORIENTATION STATUS:     PAIN:  Pain Level: 0                    NEUROLOGICAL                          STRENGTH  Strength RLE  Comment: Grossly 4-/5  Strength LLE  Comment: Grossly 4-/5  ROM  AROM RLE (degrees)  RLE AROM: WFL  AROM LLE (degrees)  LLE AROM : WFL  POSTURE/BALANCE  Balance  Sitting - Static: Good  Standing - Dynamic: Fair, +, Good, -       ACTIVITY TOLERANCE  Activity Tolerance  Activity Tolerance: Patient limited by endurance      BED MOBILITY  Bed Mobility  Rolling: Stand by assistance  Supine to Sit: Stand by assistance  Sit to Supine: Stand by assistance  Scooting: Stand by assistance  TRANSFERS  Sit to Stand: Stand by assistance      Bed to Chair: Contact guard assistance        WHEELCHAIR PROPULSION 1  Propulsion 1  Propulsion: Manual  Level: Level Tile  Method: CARLITO HERNÁNDEZ  Level of Assistance: Stand by assistance  Description/ Details: 2 turns  Distance: 50  WHEELCHAIR PROPULSION 2  Propulsion 2  Propulsion: Manual  Level: Level Tile  Method: CARLITO HERNÁNDEZ  Level of Assistance: Stand by assistance  Distance: 150  AMBULATION 1  Ambulation 1  Surface: level tile  Device: Rolling Walker  Other Apparatus: O2  Assistance: Contact guard assistance, Stand by assistance  Quality of Gait: mild forward flexed posture, reciprocal gait pattern  Distance: 50  Comments: 2 turns  AMBULATION 2  Ambulation 2  Surface - 2: level tile  Device 2: Rolling Walker Other Apparatus 2: Wheelchair follow, O2  Assistance 2: Stand by assistance  Quality of Gait 2: forward flexed posture that worsened with fatigue  Distance: 150  Comments: 2 turns  STAIRS   NOT ASSESSED    GOALS:  Short term goals  Time Frame for Short term goals: 1 TO 2 WEEKS  Short term goal 1: INDEP ON BED MOBILITY  Short term goal 2: SBA TRANSFERS  Short term goal 3: SBA  FEET W/OUT W/C FOLLOW  Short term goal 4: INDEP W/C PROPULSION 150 FEET  Short term goal 5: CGA 4 STEPS 2 RAILS    Long term goals  Time Frame for Long term goals : 2 TO 3 WEEKS  Long term goal 1: INDEP TRANSFERS  Long term goal 2: INDEP AMB WITHOUT  FEET  Long term goal 3: INDEP 4 STEPS  Long term goal 4: INDEP CAR TRANSFER  Long term goal 5: INDPE HEP  HOME LIVING:     Type of Home: House  Home Layout: One level  Home Access: Stairs to enter with rails  Entrance Stairs - Number of Steps: 2     ASSESSMENT (IMPAIRMENTS/BARRIERS):   Body structures, Functions, Activity limitations: Decreased functional mobility , Decreased strength, Decreased endurance, Decreased balance, Decreased coordination, Decreased safe awareness  Assessment: DECREASAED STRENGTH, ENDURNACE, BALANCE, SUPERVISION FOR ON BED MOBILITY, INCEDENTAL CONTACT REQUIRED FOR TRANSFERS AND AMBULATION, 02 AT 1L/MIN TO KEEP PO2 AT 90% OR GREATER  Activity Tolerance: Patient limited by endurance     PLAN:  Plan  Times per week: 5 TO 6  Times per day: Twice a day  Plan weeks: 2 TO 3 WEEKS  Current Treatment Recommendations: Strengthening, Balance Training, Functional Mobility Training, Transfer Training, Endurance Training, Wheelchair Mobility Training, Gait Training, Stair training, Home Exercise Program, Safety Education & Training, Patient/Caregiver Education & Training, Equipment Evaluation, Education, & procurement  Discharge Recommendations: Home with Home health PT PATIENT REQUIRES AND IS REASONABLY EXPECTED TO ACTIVELY PARTICIPATE IN AT LEAST 3 HOURS OF INTENSIVE THERAPY PER DAY AT LEAST 5 DAYS PER WEEK, AND BE EXPECTED TO MAKE MEASURABLE IMPROVEMENT THAT WILL BE OF PRACTICAL VALUE TO IMPROVE THE PATIENT'S FUNCTIONAL CAPACITY OR ADAPTATION TO IMPAIRMENTS.    PATIENT GOAL FOR REHAB:  RETURN TO PRIOR LEVEL OF FUNCTION       IRF/ROHNDA  Roll Left and Right  Assistance Needed: Supervision or touching assistance  CARE Score: 4  Discharge Goal: Independent    Sit to Lying  Assistance Needed: Supervision or touching assistance  CARE Score: 4  Discharge Goal: Independent    Lying to Sitting on Side of Bed  Assistance Needed: Supervision or touching assistance  CARE Score: 4  Discharge Goal: Independent    Sit to Stand  Assistance Needed: Supervision or touching assistance  CARE Score: 4  Discharge Goal: Independent    Chair/Bed-to-Chair Transfer  Assistance Needed: Supervision or touching assistance  CARE Score: 4  Discharge Goal: Independent    Car Transfer  Reason if not Attempted: Not attempted due to medical condition or safety concerns  CARE Score: 88  Discharge Goal: Independent    Walk 10 Feet  Assistance Needed: Supervision or touching assistance  CARE Score: 4  Discharge Goal: Independent    Walk 50 Feet with Two Turns  Assistance Needed: Supervision or touching assistance  CARE Score: 4  Discharge Goal: Independent    Walk 150 Feet  Assistance Needed: Supervision or touching assistance  CARE Score: 4  Discharge Goal: Independent    Walking 10 Feet on Uneven Surfaces  Reason if not Attempted: Not attempted due to medical condition or safety concerns  CARE Score: 88  Discharge Goal: Not Attempted    1 Step (Curb)  Reason if not Attempted: Not attempted due to medical condition or safety concerns  CARE Score: 88  Discharge Goal: Independent    4 Steps  Reason if not Attempted: Not attempted due to medical condition or safety concerns  CARE Score: 88  Discharge Goal: Independent 12 Steps  Reason if not Attempted: Not attempted due to medical condition or safety concerns  CARE Score: 88  Discharge Goal: Supervision or touching assistance    Wheel 50 Feet with Two Turns  Assistance Needed: Supervision or touching assistance  CARE Score: 4  Discharge Goal: Independent    Wheel 150 Feet  Assistance Needed: Supervision or touching assistance  CARE Score: 4  Discharge Goal: Independent      LAST TREATMENT TIME  PT Individual Minutes  Time In: 1000  Time Out: 1100  Minutes: 60      Electronically signed by Carmen Nieves PT on 1/22/21 at 11:33 AM CST

## 2021-01-22 NOTE — PROGRESS NOTES
Comprehensive Nutrition Assessment    Type and Reason for Visit:  Initial    Nutrition Assessment:  Following for new admit to inpatient rehab unit. Pt presents adequately nourished with wt gain documented over the last few months. Pt states his appetite is good and voices no specific food preferences/aversions. Hx DM noted, glucose running from low  since acute care stay. Diet upgraded to General with thin liquids. Will monitor for need for CHO control diet. PO >50% so far. Will monitor nutrition progression. Malnutrition Assessment:  Malnutrition Status:  No malnutrition    Context:  Acute Illness       Nutrition Related Findings:  Pitting BLE edema      Wounds:  None       Current Nutrition Therapies:    DIET GENERAL; Anthropometric Measures:  · Height: 6' (182.9 cm)  · Current Body Weight: 244 lb 1.6 oz (110.7 kg)   · Admission Body Weight: 244 lb 1.6 oz (110.7 kg)    · Usual Body Weight: 229 lb (103.9 kg)(11/2020)     · Ideal Body Weight: 178 lbs; % Ideal Body Weight 137.1 %   · BMI: 33.1  · BMI Categories: Obese Class 1 (BMI 30.0-34. 9)       Nutrition Diagnosis:   · Overweight/Obese related to excessive energy intake as evidenced by BMI      Nutrition Interventions:   Food and/or Nutrient Delivery:  Continue Current Diet  Nutrition Education/Counseling:  No recommendation at this time   Coordination of Nutrition Care:  Continue to monitor while inpatient    Goals:  Maintain PO >50%, wt stable       Nutrition Monitoring and Evaluation:   Behavioral-Environmental Outcomes:  None Identified   Food/Nutrient Intake Outcomes:  Food and Nutrient Intake  Physical Signs/Symptoms Outcomes:  Biochemical Data, Nutrition Focused Physical Findings, Skin, Weight     Discharge Planning:    No discharge needs at this time     Electronically signed by Katerine Hernandez RD, LD on 1/22/21 at 12:12 PM CST    Contact: [phone]

## 2021-01-22 NOTE — PROGRESS NOTES
Speech Language Pathology  Facility/Department: [facility] REHAB UNIT   CLINICAL BEDSIDE SWALLOW EVALUATION    NAME: Karen Jimenez  : 1940  MRN: [id number]    ADMISSION DATE: 2021  ADMITTING DIAGNOSIS: has Diverticulitis of large intestine with perforation without bleeding; Generalized abdominal pain; Colonic diverticular abscess; Bilateral carotid artery stenosis; Atherosclerosis of native artery of both lower extremities with intermittent claudication ([address] Carotid stenosis, asymptomatic, left; Primary osteoarthritis of left knee; Arthritis of knee; Essential hypertension; Pure hypercholesterolemia; Slow transit constipation; Iron deficiency anemia; GERD (gastroesophageal reflux disease); Acute liver failure without hepatic coma; CHF (congestive heart failure) ([address] Bilateral pleural effusion; Palliative care patient; Shock liver; Acute renal failure (HCC); BPH associated with nocturia; Bleeding diathesis ([address] Epistaxis; Acute blood loss anemia; Melena; Hypocalcemia; Pneumonia due to infectious organism; Bladder cancer ([address] Postoperative pain; History of bladder cancer; Severe sepsis ([address] Acute on chronic respiratory failure ([address] UTI (urinary tract infection); Acute on chronic kidney failure ([address] Hypoxemia; Metabolic acidosis; Acidosis, metabolic, with respiratory acidosis; Acute respiratory failure ([address] Type 2 diabetes mellitus with complication, without long-term current use of insulin ([address] Weakness;  Other dysphagia; and Low back pain on their problem list.     History: Per neurologist: This 79 y.o. male  with a past medical history of bladder cancer, HTN, CKD stage III, PVD, hyperlipidemia,CAD,COPD not on home oxygen,ETOH use drinks 2 glasses of wine every night and former smoker. He presented to [facility] ER on 1/17/21 with lethargy and confusion. He was found to be Septic with acute respiratory failure with hypercapnia and bilateral pneumonia. He was intubated in ER and admitted to ICU under the hospitalist service. He was also found to have ANA and possible UTI. He was placed on IV  vancomycin and Zosyn, which he has now been on for 4 days with all cultures currently negative to date. Patient was able to extubated on 1/19/21 and moved out of ICU on 1/20/21. He is still requiring oxygen at 3 l/m nc. He was evaluated by SPT for swallow and placed on a mechanical soft diet with nectar thick liquids. SPT also completed a cognitive eval. He was found to have  functional cognitive skills as evidenced by the Mini-Mental State Exam. Pt has minimal deficits with processing speed. Pt also has mild word-finding deficits in conversation that are resolved with a short response latency. Pt was observed with speech errors occasionally in spontaneous speech utterances. Pt did self-correct errors 1/2x. Sepsis and ANA have now resolved. He is participating in both PT/OT. He is felt to need a stay on Rehab to work towards his goal of returning home with his wife. He is now felt ready to start the Rehab program.    Recent Chest Xray/CT of Chest:  EXAMINATION:  XR CHEST PORTABLE  1/18/2021 7:14 AM   HISTORY: NG tube placement. Shortness of air. COMPARISON: 1/18/2021 at 0139 hours. FINDINGS: Pieter Thorne is hypoventilation. The patient is intubated. An NG   tube extends to the stomach. Bilateral infiltrates are unchanged. Heart size upper limits of normal.       Impression   1. New NG tube in good position. Patient remains intubated. 2. Bilateral infiltrates without significant change. Signed by Dr Singh Adams on 1/18/2021 7:16 AM         Date of Eval: 1/22/2021  Evaluating Therapist: Eliana Nation    Current Diet level:  Current Diet : Dysphagia Soft and Bite-Sized (Dysphagia III)  Current Liquid Diet : Mildly Thick (Nectar)      Pain:  Pain Assessment  Pain Assessment: 0-10  Pain Level: 0  Patient's Stated Pain Goal: No pain    Reason for Referral  Toma Duarte was referred for a bedside swallow evaluation to assess the efficiency of his swallow function, identify signs and symptoms of aspiration and make recommendations regarding safe dietary consistencies, effective compensatory strategies, and safe eating environment. Impression  Dysphagia Diagnosis: (WNL)  Dysphagia Impression : Oral and pharyngeal phases of the swallow are WNL. The patient was diagnosed with sepsis and bilateral infiltrates on his chest x-ray. Question if he aspirated while septic and had decreased alertness. the patient now exhibits good awareness and orientation. He exhibits a strong cough and throat clear for adequate airway protection. No overt s/s of aspiration were observed at the bedside. He is recommeded to recieve a regular diet with thin liquids, medications whole with water, and self feed. Dysphagia Outcome Severity Scale: Level 7: Normal in all situations     Treatment Plan  Requires SLP Intervention: No  Duration/Frequency of Treatment: Evaluation only. No treatment is warrented. D/C Recommendations: To be determined       Recommended Diet and Intervention  Diet Solids Recommendation: Regular  Liquid Consistency Recommendation: Thin  Recommended Form of Meds: Whole with water          Compensatory Swallowing Strategies  Compensatory Swallowing Strategies: Upright as possible for all oral intake;Remain upright for 30-45 minutes after meals      General  Chart Reviewed: Yes  Subjective  Subjective: Patient was oriented to time and place. Behavior/Cognition: Alert; Cooperative Temperature Spikes Noted: Yes  Respiratory Status: Room air  Breath Sounds: Clear  O2 Device: None (Room air)  Communication Observation: Functional  Follows Directions: Simple  Patient Positioning: Upright in bed  Baseline Vocal Quality: Normal  Volitional Cough: Strong  Volitional Swallow: (WNL)   Intubation date: 1/17/21  Extubation date: 1/19/21  History: COPD, former smoker    Dysphagia History:  The patient reported he was consuming a regular diet with thin liquids at home. He denied any difficulty with liquids, solids, or medications. He stated he could take multiple pills at one time with water. Patient was seen by speech therapy on 1/17/21. Monitored patient's swallowing function S/P extubation 1/19. Patient exhibits decreased oral prep of more solid consistencies, fast oral transit and suspected swallow delay with thin liquids, and sluggish, mild-moderately decreased laryngeal elevation for swallow airway protection. Even so, no outward S/S penetration/aspiration was noted with any soft and bite sized consistency presentation, nectar thick liquid presentation, or thin H2O trial presented during treatment session this date.     At this time, would recommend continuation soft and bite sized consistency and nectar thick liquids. Recommend meds whole in pudding/applesauce. If patient receives mouth care prior to intake, okay for ice chips and small sips thin H2O IN BETWEEN MEALS for comfort. Will continue to follow. Vision/Hearing  Vision  Vision: Within Functional Limits(Patient has had bilateral cataract surgery.)  Hearing  Hearing: Exceptions to [facility]  Hearing Exceptions: Hard of hearing/hearing concerns    Oral Motor Deficits  Oral/Motor  Oral Motor: Exceptions to WFL(Natural dentention on top, dentures on bottom. Slight right labial asymmetry and lingual deviation to the right side.  Patient reported good sensation inside oral cavity and facial sensation.) Labial Symmetry: Abnormal symmetry right  Labial Sensation: (WFL)  Lingual Symmetry: Abnormal symmetry right    Oral Phase Dysfunction  Oral Phase  Oral Phase: WNL  Oral Phase  Oral Phase - Comment: Oral phase was WNL. Good mastication, timely oral transit, no significant oral residue, and no labial spillage. Indicators of Pharyngeal Phase Dysfunction   Pharyngeal Phase  Pharyngeal Phase: WNL  Pharyngeal Phase   Pharyngeal: Pharyngeal phase was WNL. Pt tolerated nectar thick liquid via cup, a single ice chip via spoon, 3cc thin liquid via tsp, and thin liquid via straw, puree via spoon, and a solid. Patient shows no overst s/s of aspiration. Prognosis  Good    Education  Discussed results with patient.     Therapy Time  SLP Individual Minutes  Time In: 0900  Time Out: 1000  Minutes: RACHEL Oneill 42  1/22/2021 10:38 AM     Electronically signed by Eliana Nation, Graduate Clinician, on 1/22/2021 at 2:10 PM

## 2021-01-22 NOTE — H&P
[facility]   History and Physical        Patient:   Kat Maier  MR#:    [id number]  Account Number:                   [id number]      Room:    [address] [place], [place] [address]-   YOB: 1940  Date of Progress Note: 1/22/2021  Time of Note                           8:05 AM  Attending Physician:  Talib Barbosa MD        Admitting diagnosis:Acute respiratory failure with hypercapnia    Secondary diagnoses:Essential (primary) hypertension,Heart failure, unspecified,Acute cystitis with hematuria,Acute kidney failure, unspecified,Chronic kidney disease, stage 3 unspecified,Pneumonia, unspecified organism,Gastro-esophageal reflux disease without esophagitis,Hyperlipidemia, unspecified,Peripheral vascular disease, unspecified,Enlarged prostate with lower urinary tract symptoms    CHIEF COMPLAINT:  Acute respiratory failure        HISTORY OF PRESENT ILLNESS: This 79 y.o. male  with a past medical history of bladder cancer, HTN, CKD stage III, PVD, hyperlipidemia,CAD,COPD not on home oxygen,ETOH use drinks 2 glasses of wine every night and former smoker. He presented to [facility] ER on 1/17/21 with lethargy and confusion. He was found to be Septic with acute respiratory failure with hypercapnia and bilateral pneumonia. He was intubated in ER and admitted to ICU under the hospitalist service. He was also found to have ANA and possible UTI. He was placed on IV  vancomycin and Zosyn, which he has now been on for 4 days with all cultures currently negative to date. Patient was able to extubated on 1/19/21 and moved out of ICU on 1/20/21. He is still requiring oxygen at 3 l/m nc. He was evaluated by SPT for swallow and placed on a mechanical soft diet with nectar thick liquids. SPT also completed a cognitive eval. He was found to have  functional cognitive skills as evidenced by the Mini-Mental State Exam. Pt has minimal deficits with processing speed. Pt also has mild word-finding deficits in conversation that are resolved with a short response latency. Pt was observed with speech errors occasionally in spontaneous speech utterances. Pt did self-correct errors 1/2x. Sepsis and ANA have now resolved. He is participating in both PT/OT. He is felt to need a stay on Rehab to work towards his goal of returning home with his wife. He is now felt ready to start the Rehab program.    REVIEW OF SYSTEMS:  Constitutional  No fever or chills. No diaphoresis or significant fatigue. HENT   No tinnitus or significant hearing loss. Eyes  no sudden vision change or eye pain  Respiratory  some shortness of breath no cough  Cardiovascular  no chest pain No palpitations or significant leg swelling  Gastrointestinal  no abdominal swelling or pain. Genitourinary  No difficulty urinating, dysuria  Musculoskeletal  + back pain no myalgia.   Skin  no color change or rash Neurologic  No seizures. No lateralizing weakness. Hematologic  no easy bruising or excessive bleeding. Psychiatric  no severe anxiety or nervousness. All other review of systems are negative. Past Medical History:      Diagnosis Date    Acute liver failure without hepatic coma 10/23/2018    Back pain     \"with tired legs as a result\"    Bladder cancer ([facility] [address] 12/19/2018    Blood circulation, collateral     Carotid arterial disease (HCC)     recent surgery    CKD (chronic kidney disease), 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/2018    GERD (gastroesophageal reflux disease)     Hyperlipidemia     Hypertension     Hypertension     Palliative care patient 10/23/2018    Pneumonia due to infectious organism 11/06/2018    Primary osteoarthritis of left knee 10/14/2018    PVD (peripheral vascular disease) (HCC)     Tremor     Tremor on Right side x 1-2 weeks per stepdaughter    Type 2 diabetes mellitus with complication, without long-term current use of insulin ([facility] [address] 1/21/2021       Past Surgical History:      Procedure Laterality Date    BACK SURGERY      COLONOSCOPY  2007?     CYSTOSCOPY Bilateral 12/19/2018    CYSTOSCOPY, BIOSPY FULGURATION OF BLADDER TUMOR POSSIBLE TURBT, RETROGRADE PYELOGRAM performed by Cheli Parisi MD at [address] COLONOSCOPY FLX DX W/COLLJ SPEC WHEN PFRMD N/A 9/11/2017    Dr Michael Paul internal hemorrhoids, diverticular disease-HP-No recall (age)   [place] REVISE MEDIAN N/CARPAL TUNNEL SURG Left 7/18/2018    OPEN CARPAL TUNNEL RELEASE performed by Marielos Montes MD at [address] Left 8/28/2018    LEFT CAROTID ENDARTERECTOMY WITH VEIN PATCH ANGIOPLASTY AND COMPLETION ANGIOGRAM performed by Jayce Pascual MD at [address] [facility] Left 10/15/2018    LEFT COMPLEX TOTAL KNEE ARTHROPLASTY performed by Marielos Montes MD at [address] [place] [address]  04/21/2015 Tariq BESS Ultrasound guided access of left common femoral artery. Aortogram.Diagnostic right lower extremity arteriogram.Radiologic supervision and interpretation.  VASCULAR SURGERY  01/13/2015    Tariq Potter M.D Atherectomy,angioplasty,and stenting of left superficial femoral artery.  VASCULAR SURGERY  03/11/2014    Tariq Potter M.D. Ultrasound-guided access of right common femoral artery. Aortogram.Left lower extremity arteriogram.Atherectomy and angioplasty of left superficial femoral artery. Radiologic supervision and interpretation.  VASCULAR SURGERY  01/18/2013    Tariq BESS Aortogram.Multistation arteriogram right lower extremity. Laser atherectomy and angioplasty of right superficial femoral artery. Selective catheterization of right tibioperoneal trunk. Angioplasty of peroneal artery and tibioperoneal trunk.  VASCULAR SURGERY  10/30/2018    S. Ultrasound guided cannulation of right internal vein. Placement of right internal jugular vein tunneled dialysis catheter bard equistream xk 23cm tip to cuff    VASCULAR SURGERY  12/17/2018    SJS. Removal of tunneled dilaysis catheter right internal jugular vein.        Medications in Hospital:      Current Facility-Administered Medications:     acetaminophen (TYLENOL) tablet 650 mg, 650 mg, Oral, Q6H PRN **OR** acetaminophen (TYLENOL) suppository 650 mg, 650 mg, Rectal, Q6H PRN, Colleen Walton MD    famotidine (PEPCID) injection 20 mg, 20 mg, Intravenous, Daily, Colleen Walton MD    magnesium sulfate 2000 mg in 50 mL IVPB premix, 2 g, Intravenous, PRN, Colleen Walton MD    ondansetron [facility]) injection 4 mg, 4 mg, Intravenous, Q6H PRN, Colleen Walton MD    polyethylene glycol [facility]) packet 17 g, 17 g, Oral, Daily PRN, Colleen Walton MD   potassium chloride (KLOR-CON M) extended release tablet 40 mEq, 40 mEq, Oral, PRN **OR** potassium bicarb-citric acid (EFFER-K) effervescent tablet 40 mEq, 40 mEq, Oral, PRN **OR** potassium chloride 10 mEq/100 mL IVPB (Peripheral Line), 10 mEq, Intravenous, PRN, Berlin Amos MD    sodium chloride flush 0.9 % injection 10 mL, 10 mL, Intravenous, 2 times per day, Berlin Amos MD, 10 mL at 01/21/21 2207    sodium chloride flush 0.9 % injection 10 mL, 10 mL, Intravenous, PRN, Berlin Amos MD    dextrose 5 % solution, 100 mL/hr, Intravenous, PRN, Berlin Amos MD    dextrose 50 % IV solution, 12.5 g, Intravenous, PRN, Berlin Amos MD    glucagon (rDNA) injection 1 mg, 1 mg, Intramuscular, PRN, Berlin Amos MD    glucose (GLUTOSE) 40 % oral gel 15 g, 15 g, Oral, PRN, Berlin Amos MD    aspirin EC tablet 81 mg, 81 mg, Oral, Daily, Berlin Amos MD, 81 mg at 01/21/21 1704    atorvastatin (LIPITOR) tablet 20 mg, 20 mg, Oral, Daily, Berlin Amos MD, 20 mg at 01/21/21 1704    dextrose 50 % IV solution, 25 g, Intravenous, PRN, Berlin Amos MD    glipiZIDE (GLUCOTROL) tablet 10 mg, 10 mg, Oral, QAM AC, Berlin Amos MD, 10 mg at 01/22/21 0548    insulin lispro (HUMALOG) injection vial 0-12 Units, 0-12 Units, Subcutaneous, TID WC, Berlin Amos MD    insulin lispro (HUMALOG) injection vial 0-6 Units, 0-6 Units, Subcutaneous, Nightly, Berlin Amos MD    metoprolol succinate (TOPROL XL) extended release tablet 100 mg, 100 mg, Oral, Daily, Berlin Amos MD    NIFEdipine (PROCARDIA XL) extended release tablet 60 mg, 60 mg, Oral, Daily, Berlin Amos MD, 60 mg at 01/21/21 1704    pantoprazole (PROTONIX) tablet 40 mg, 40 mg, Oral, Daily, Berlin Amos MD, 40 mg at 01/21/21 1704    tamsulosin (FLOMAX) capsule 0.4 mg, 0.4 mg, Oral, Daily, Berlin Amos MD, 0.4 mg at 01/21/21 1704    acetaminophen (TYLENOL) tablet 650 mg, 650 mg, Oral, Q4H PRN, Dimitrios Stephenson MD   enoxaparin (LOVENOX) injection 40 mg, 40 mg, Subcutaneous, Daily, Ana Laurent MD    Allergies:  Eliquis [apixaban] and Promethazine hcl    Social History:   TOBACCO:   reports that he quit smoking about 17 years ago. He has never used smokeless tobacco.  ETOH:   reports current alcohol use of about 12.0 standard drinks of alcohol per week. Family History:       Problem Relation Age of Onset    Colon Cancer Father     Diabetes Brother     Colon Polyps Neg Hx     Liver Cancer Neg Hx     Liver Disease Neg Hx     Esophageal Cancer Neg Hx     Rectal Cancer Neg Hx     Stomach Cancer Neg Hx            Physical Exam:    Vitals: BP (!) 156/80   Pulse 89   Temp 97.6 °F (36.4 °C) (Temporal)   Resp 16   Ht 6' (1.829 m)   Wt 244 lb 1.6 oz (110.7 kg)   SpO2 90%   BMI 33.11 kg/m²     Constitutional  well developed, well nourished. Eyes  conjunctiva normal.  Pupils not tested  Ear, nose, throat -hearing intact to finger rub No scars, masses, or lesions over external nose or ears, no atrophy of tongue  Neck-symmetric, no masses noted, no jugular vein distension  Respiration- chest wall appears symmetric, good expansion,   normal effort without use of accessory muscles  Musculoskeletal  no significant wasting of muscles noted, no bony deformities  Extremities-no clubbing, cyanosis or edema  Skin  warm, dry, and intact. No rash, erythema, or pallor.   Psychiatric  mood, affect, and behavior appear normal.      Neurological exam  Awake, alert, fluent oriented x 3 appropriate affect  Attention and concentration appear appropriate  Recent and remote memory appears unremarkable  Speech normal without dysarthria  No clear issues with language of fund of knowledge    Cranial Nerve Exam   CN II- Visual fields grossly unremarkable  CN III, IV,VI-EOMI, No nystagmus, conjugate eye movements, no ptosis  CN V-sensation intact to LT over face  CN VII-no facial assymetry  CN VIII-Hearing intact to finger rub CN IX and X- Palate not tested  CN XI-not test shoulder shrug  CN XII-Tongue midline with no fasciculations or fibrillations    Motor Exam  Antigravity throughout upper and lower extremities bilaterally, no cogwheeling, normal tone    Sensory Exam  Sensation reduced below knees    Reflexes   Not tested    Tremors- no tremors in hands or head noted    Gait  Not tested    Coordination  Finger to nose-unremarkable        CBC:   Recent Labs     01/20/21  0129 01/21/21 0455 01/22/21 0459   WBC 8.7 6.8 7.4   HGB 12.9* 12.5* 12.5*    169 150       BMP:    Recent Labs     01/20/21  0129 01/21/21  0455 01/22/21 0459    144 142   K 3.9 4.0 3.6    105 104   CO2 27 28 27   BUN 44* 29* 21   CREATININE 2.0* 1.8* 1.6*   GLUCOSE 89 84 97       Hepatic:   Recent Labs     01/22/21 0459   AST 25   ALT 24   BILITOT 0.7   ALKPHOS 91       Lipids: No results for input(s): CHOL, HDL in the last 72 hours. Invalid input(s): LDLCALCU    INR:   Recent Labs     01/21/21  1514   INR 1.16           Assessment and Plan     1. Acute respiratory failure/h/o COPD-off abx-monitor  2. CAD-ASA/statin  3. Hyperlipidemia-on statin  4. DVT prophylaxis-Lovenox  5. GI-PPI/bowel regimen  6. DM-oral hypoglycemics-monitor BS  7. HTN-on meds monitor  8. BPH/history of bladder cancer-Flomax  9. Back pain/osteoarthritis-tylenol PRN  10. Acute on chronic renal failure-monitor  11. History of ETOH use  12. PT/OT/Speech          Patient's functional assessment  Prior to hospitalization the patient was continent of bowel and incontinent of bladder    Current therapy  Requires PT, OT and/or speech therapy    Anticipated discharge approximately 22 days    Functional assessment  All notes from reehab data were reviewed regarding the patient's functional status.         Anticipated discharge setting  Home with home care    No clear barriers to discharge    The patient appears to be an appropriate candidate for inpatient rehabilitation Sufficiently stable: Patient's condition is sufficiently stable at the time of admission to allow the patient to actively participate in an intensive rehabilitation program    Close medical supervision: A rehabilitation physician, or other licensed treating physician with specialized training and experience in inpatient rehabilitation, will conduct face-to-face visits with the patient a minimum of at least 3 days per week throughout the patient's stay    This patient requires close medical supervision for the active management of the ongoing conditions and potential complications stated in the admission note    Intensive rehabilitation nursing: The patient demonstrates the need for 24-hour rehabilitation nursing care for active management of the multiple medical issues documented in the admission note    Appropriate therapy needed: The patient requires the active and ongoing therapeutic intervention of at least 2 therapeutic disciplines, one of which must be physical or occupational therapy and/or speech therapy    Intensive therapy: The patient requires and is reasonably expected to actively participate in at least 3 hours of therapy per day at least 5 days per week, and expected to make measurable improvements that will be of practical value to improve the patient's functional capacity or adaptation to impairments.  In addition, therapy treatments will begin within 36 hours from midnight of the day of the patient's admission to the inpatient rehabilitation facility    Expected duration and frequency therapy: 180 minutes per day, 5 days per week    Interdisciplinary team: The patient demonstrates the need for an interdisciplinary team for active management of the following medical issues including ataxia, motor planning, balance, disease management, elimination, endurance, family training, education, independent ADLs, pain management, precautions, range of motion, safety, strength, and transfers I have reviewed the preadmission screening documents and concur with the findings. I believe the patient meets criteria and is sufficiently stable to allow participation in the program. This requires an intensive level of therapy, close medical supervision, and an interdisciplinary team approach provided through an individualized plan of care. I approve admitting this patient for an intensive inpatient rehabilitation program.    Please feel free to call with any questions   [phone] (cell phone)    Dr Antonio Trent certified in Neurology  Board Certified in Clinical Neurophysiology  Fellowship Trained in [place] [address] Neurophysiology      EMR Dragon/transcription disclaimer:Significant part of this  encounter note is electronic transcription/translation of spoken language to printed text. The electronic translation of spoken language may be erroneous, or at times, nonsensical words or phrases may be inadvertently transcribed.  Although I have reviewed the note for such errors, some may still exist.

## 2021-01-22 NOTE — PROGRESS NOTES
Occupational Therapy   Occupational Therapy Initial Assessment  Date: 2021   Patient Name: Miles Savage  MRN: [id number]     : 1940    Date of Service: 2021      Assessment   Performance deficits / Impairments: Decreased endurance;Decreased functional mobility ; Decreased ADL status; Decreased balance;Decreased strength  OT Education: OT Role;Plan of Care;ADL Adaptive Strategies  Activity Tolerance  Activity Tolerance: Patient Tolerated treatment well  Safety Devices  Safety Devices in place: Yes  Type of devices: Bed alarm in place;Call light within reach; Left in bed           Patient Diagnosis(es): There were no encounter diagnoses. has a past medical history of Acute liver failure without hepatic coma, Back pain, Bladder cancer ([facility] [address] Blood circulation, collateral, Carotid arterial disease ([address] CKD (chronic kidney disease), stage II, GERD (gastroesophageal reflux disease), Hyperlipidemia, Hypertension, Hypertension, Palliative care patient, Pneumonia due to infectious organism, Primary osteoarthritis of left knee, PVD (peripheral vascular disease) ([address] Tremor, and Type 2 diabetes mellitus with complication, without long-term current use of insulin ([address] has a past surgical history that includes back surgery; Tonsillectomy and adenoidectomy; Colonoscopy (?); pr colonoscopy flx dx w/collj spec when pfrmd (N/A, 2017); pr revise median n/carpal tunnel surg (Left, 2018); pr thromboendartectmy neck,neck incis (Left, 2018); vascular surgery (2015); vascular surgery (2015); vascular surgery (2014); vascular surgery (2013); pr total knee arthroplasty (Left, 10/15/2018); vascular surgery (10/30/2018); vascular surgery (2018); and Cystoscopy (Bilateral, 2018).            Restrictions  Restrictions/Precautions Discharge Goal: Independent     Upper Body Dressing  Assistance Needed: Setup or clean-up assistance  CARE Score: 5  Discharge Goal: Independent     Lower Body Dressing  Assistance Needed: Supervision or touching assistance  CARE Score: 4  Discharge Goal: Independent     Putting On/Taking Off Footwear  Assistance Needed: Supervision or touching assistance  CARE Score: 4     Toilet Transfer  Assistance Needed: Supervision or touching assistance  Comment: RW  CARE Score: 4  Discharge Goal: Independent     Picking Up Object  Reason if not Attempted: Not attempted due to medical condition or safety concerns  CARE Score: 88  Discharge Goal: Independent          Plan   Plan  Current Treatment Recommendations: Self-Care / ADL, Safety Education & Training, Endurance Training, Strengthening, Functional Mobility Training, Balance Training, Equipment Evaluation, Education, & procurement, Patient/Caregiver Education & Training, Home Management Training      Goals  Short term goals  Time Frame for Short term goals: 1 week  Short term goal 1: complete overall toileting with supervision  Short term goal 2: complete LB dressing with supervision  Short term goal 3: complete overall bathing with supervision  Short term goal 4: complete ambulatory home making task with supervision  Short term goal 5: complete 1-2 handed standing level task for 3 mins with supervision  Short term goal 6: complete HEP x 5 occasions in order to improve strength and endurance for ADLs  Long term goals  Time Frame for Long term goals : 10-14 days  Long term goal 1: complete overall bathing with modified independence  Long term goal 2: complete overall toileting with modified independence  Long term goal 3: complete simple ambulatory home making task with modified independence  Long term goal 4: complete overall dressing with modified independence  Long term goal 5: complete HEP with independence  Long term goals 6: pt/family verbalize DME  Patient Goals Patient goals : go home       Therapy Time   Individual Concurrent Group Co-treatment   Time In 1330         Time Out 1430         Minutes 60         Timed Code Treatment Minutes: 45 Minutes     Electronically signed by Dora Lu OT on 1/22/2021 at 4:36 PM

## 2021-01-23 LAB
BLOOD CULTURE, ROUTINE: NORMAL
BLOOD CULTURE, ROUTINE: NORMAL
CULTURE, BLOOD 2: NORMAL
GLUCOSE BLD-MCNC: 101 MG/DL (ref 70–99)
GLUCOSE BLD-MCNC: 107 MG/DL (ref 70–99)
GLUCOSE BLD-MCNC: 115 MG/DL (ref 70–99)
GLUCOSE BLD-MCNC: 127 MG/DL (ref 70–99)
PERFORMED ON: ABNORMAL

## 2021-01-23 PROCEDURE — 97530 THERAPEUTIC ACTIVITIES: CPT

## 2021-01-23 PROCEDURE — 82947 ASSAY GLUCOSE BLOOD QUANT: CPT

## 2021-01-23 PROCEDURE — 6370000000 HC RX 637 (ALT 250 FOR IP): Performed by: PSYCHIATRY & NEUROLOGY

## 2021-01-23 PROCEDURE — 97116 GAIT TRAINING THERAPY: CPT

## 2021-01-23 PROCEDURE — 97535 SELF CARE MNGMENT TRAINING: CPT

## 2021-01-23 PROCEDURE — 1180000000 HC REHAB R&B

## 2021-01-23 PROCEDURE — 97110 THERAPEUTIC EXERCISES: CPT

## 2021-01-23 PROCEDURE — 99232 SBSQ HOSP IP/OBS MODERATE 35: CPT | Performed by: PSYCHIATRY & NEUROLOGY

## 2021-01-23 PROCEDURE — 6370000000 HC RX 637 (ALT 250 FOR IP): Performed by: INTERNAL MEDICINE

## 2021-01-23 PROCEDURE — 2580000003 HC RX 258: Performed by: INTERNAL MEDICINE

## 2021-01-23 PROCEDURE — 6360000002 HC RX W HCPCS: Performed by: PSYCHIATRY & NEUROLOGY

## 2021-01-23 RX ADMIN — TAMSULOSIN HYDROCHLORIDE 0.4 MG: 0.4 CAPSULE ORAL at 08:22

## 2021-01-23 RX ADMIN — AZITHROMYCIN MONOHYDRATE 500 MG: 250 TABLET ORAL at 08:22

## 2021-01-23 RX ADMIN — GLIPIZIDE 5 MG: 5 TABLET ORAL at 06:30

## 2021-01-23 RX ADMIN — NIFEDIPINE 60 MG: 60 TABLET, FILM COATED, EXTENDED RELEASE ORAL at 08:22

## 2021-01-23 RX ADMIN — METOPROLOL SUCCINATE 100 MG: 50 TABLET, EXTENDED RELEASE ORAL at 08:22

## 2021-01-23 RX ADMIN — SODIUM CHLORIDE, PRESERVATIVE FREE 10 ML: 5 INJECTION INTRAVENOUS at 08:42

## 2021-01-23 RX ADMIN — PANTOPRAZOLE SODIUM 40 MG: 40 TABLET, DELAYED RELEASE ORAL at 08:22

## 2021-01-23 RX ADMIN — SODIUM CHLORIDE, PRESERVATIVE FREE 10 ML: 5 INJECTION INTRAVENOUS at 21:03

## 2021-01-23 RX ADMIN — ATORVASTATIN CALCIUM 20 MG: 20 TABLET, FILM COATED ORAL at 08:22

## 2021-01-23 RX ADMIN — ASPIRIN 81 MG: 81 TABLET, COATED ORAL at 08:22

## 2021-01-23 RX ADMIN — ENOXAPARIN SODIUM 40 MG: 40 INJECTION SUBCUTANEOUS at 08:22

## 2021-01-23 RX ADMIN — TROSPIUM CHLORIDE 20 MG: 20 TABLET, FILM COATED ORAL at 21:03

## 2021-01-23 NOTE — PROGRESS NOTES
01/23/21 1118 01/23/21 1123 01/23/21 1124   Restrictions/Precautions   Restrictions/Precautions Weight Bearing; Fall Risk  --   --    Pain Screening   Patient Currently in Pain  --  No  --    Bed Mobility   Rolling  --  Stand by assistance  --    Supine to Sit  --  Stand by assistance  --    Transfers   Sit to Stand  --  Stand by assistance  --    Stand to sit  --  Stand by assistance  --    Bed to Chair  --  Contact guard assistance  --    Lateral Transfers  --   --   --    Ambulation   Ambulation?  --   --  Yes   WB Status  --   --  WBAT BLE'S   Ambulation 1   Surface  --   --  level tile   Device  --   --  Rolling Walker   Assistance  --   --  Contact guard assistance;Stand by assistance   Quality of Gait  --   --  mild forward flexed posture, reciprocal gait pattern   Distance  --   --  48' x2   Comments  --   --  Incorporated  turns. Wheelchair Activities   Wheelchair Parts Management  --   --  Yes   Left Brakes Level of Assistance  --   --  Stand by assistance   Right Brakes Level of Assistance  --   --  Stand by Assist   Propulsion  --   --  Yes   Propulsion 1   Propulsion  --   --  Manual   Level  --   --  Level Tile   Method  --   --  RUE;LUE   Level of Assistance  --   --  Stand by assistance   Description/ Details  --   --  Incorporated turns.    Distance  --   --  80' x2   Exercises   Comments  --   --   --    Conditions Requiring Skilled Therapeutic Intervention   Assessment  --   --   --    Activity Tolerance   Activity Tolerance  --   --   --    Safety Devices   Type of devices  --   --   --       01/23/21 1139 01/23/21 1144 01/23/21 1200   Restrictions/Precautions   Restrictions/Precautions  --   --   --    Pain Screening   Patient Currently in Pain  --   --   --    Bed Mobility   Rolling  --   --   --    Supine to Sit  --   --   --    Transfers   Sit to Stand  --   --   --    Stand to sit  --   --   --    Bed to Chair  --   --   --    Lateral Transfers  --   --  Contact guard assistance (W/C to [place])   Ambulation   Ambulation?  --   --   --    WB Status  --   --   --    Ambulation 1   Surface  --   --   --    Device  --   --   --    Assistance  --   --   --    Quality of Gait  --   --   --    Distance  --   --   --    Comments  --   --   --    Wheelchair Activities   Wheelchair Parts Management  --   --   --    Left Brakes Level of Assistance  --   --   --    Right Brakes Level of Assistance  --   --   --    Propulsion  --   --   --    Propulsion 1   Propulsion  --   --   --    Level  --   --   --    Method  --   --   --    Level of Assistance  --   --   --    Description/ Details  --   --   --    Distance  --   --   --    Exercises   Comments Sitting BLE ex x15 reps. --   --    Conditions Requiring Skilled Therapeutic Intervention   Assessment  --  Jameson session with rests. --    Activity Tolerance   Activity Tolerance  --  Patient limited by endurance  --    Safety Devices   Type of devices  --   --  Call light within reach; Chair alarm in place   Electronically signed by Jovi Browning PTA on 1/23/2021 at 12:22 PM

## 2021-01-23 NOTE — PROGRESS NOTES
Patient assessed for rehabilitation services?: Yes  Additional Pertinent Hx: COPD--no home O2, bladder CA  Family / Caregiver Present: No  Diagnosis: Hypoxemic respiratory failure, Sepsis, pneumonia  Vital Signs  Pulse: 87  Oxygen Therapy  SpO2: 91 %(Post functional mobility, room>OT)  Pulse Oximeter Device Mode: Intermittent  Pulse Oximeter Device Location: Left;Finger  O2 Device: None (Room air)   Orientation     Objective       Instrumental ADL's  Instrumental ADLs: Yes  Meal Prep  Meal Prep Level: Walker(RW)  Meal Prep Level of Assistance: Contact guard assistance     Balance  Sitting Balance: Supervision  Standing Balance: Contact guard assistance  Functional Mobility  Functional - Mobility Device: Rolling Walker  Activity: To/from bathroom; To/From therapy gym;Retrieve items;Transport items  Assist Level: Contact guard assistance     Transfers  Sit to stand: Stand by assistance  Stand to sit: Stand by assistance           Plan   Plan  Current Treatment Recommendations: Self-Care / ADL, Safety Education & Training, Endurance Training, Strengthening, Functional Mobility Training, Balance Training, Equipment Evaluation, Education, & procurement, Patient/Caregiver Education & Training, Home Management Training       OutComes Score       01/23/21 1230   Oral Hygiene   Assistance Needed Setup or clean-up assistance   CARE Score 5       Goals  Short term goals  Time Frame for Short term goals: 1 week  Short term goal 1: complete overall toileting with supervision  Short term goal 2: complete LB dressing with supervision  Short term goal 3: complete overall bathing with supervision  Short term goal 4: complete ambulatory home making task with supervision  Short term goal 5: complete 1-2 handed standing level task for 3 mins with supervision  Short term goal 6: complete HEP x 5 occasions in order to improve strength and endurance for ADLs  Long term goals  Time Frame for Long term goals : 10-14 days Long term goal 1: complete overall bathing with modified independence  Long term goal 2: complete overall toileting with modified independence  Long term goal 3: complete simple ambulatory home making task with modified independence  Long term goal 4: complete overall dressing with modified independence  Long term goal 5: complete HEP with independence  Long term goals 6: pt/family verbalize DME  Patient Goals   Patient goals : go home       Therapy Time   Individual Concurrent Group Co-treatment   Time In 1230         Time Out 1315         Minutes 45         Timed Code Treatment Minutes: [address], OT

## 2021-01-23 NOTE — PLAN OF CARE
Problem: Gas Exchange - Impaired:  Goal: Levels of oxygenation will improve  Description: Levels of oxygenation will improve  1/23/2021 0353 by Patric Lackey LPN  Outcome: Ongoing  1/23/2021 0344 by Patric Lackey LPN  Outcome: Ongoing  1/22/2021 1720 by Hoa Barcenas RN  Outcome: Ongoing     Problem: Mobility - Impaired:  Goal: Mobility will improve  Description: Mobility will improve  1/23/2021 0353 by Patric Lackey LPN  Outcome: Ongoing  1/23/2021 0344 by Patric Lackey LPN  Outcome: Ongoing  1/22/2021 1720 by Hoa Barcenas RN  Outcome: Ongoing     Problem: SAFETY  Goal: Free from accidental physical injury  1/23/2021 0353 by Patric Lackey LPN  Outcome: Ongoing  1/23/2021 0344 by Patric Lackey LPN  Outcome: Ongoing  1/22/2021 1720 by Hoa Barcenas RN  Outcome: Ongoing  Goal: Free from intentional harm  1/23/2021 0353 by Patric Lackey LPN  Outcome: Ongoing  1/23/2021 0344 by Patric Lackey LPN  Outcome: Ongoing  1/22/2021 1720 by Hoa Barcenas RN  Outcome: Ongoing  Goal: LTG - Patient will demonstrate safety requirements appropriate to situation/environment  1/23/2021 0353 by Patric Lackey LPN  Outcome: Ongoing  1/23/2021 0344 by Patric Lackey LPN  Outcome: Ongoing  1/22/2021 1720 by Hoa Barcenas RN  Outcome: Ongoing  Goal: LTG - patient will utilize safety techniques  1/23/2021 0353 by Patric Lackey LPN  Outcome: Ongoing  1/23/2021 0344 by Patric Lackey LPN  Outcome: Ongoing  1/22/2021 1720 by Hoa Barcenas RN  Outcome: Ongoing  Goal: STG - patient locks brakes on wheelchair  1/23/2021 0353 by Patric Lackey LPN  Outcome: Ongoing  1/23/2021 0344 by Patric Lackey LPN  Outcome: Ongoing  1/22/2021 1720 by Hoa Barcenas RN  Outcome: Ongoing  Goal: STG - Patient uses call light consistently to request assistance with transfers  1/23/2021 0353 by Patric Lackey LPN  Outcome: Ongoing  1/23/2021 0344 by Patric Lackey LPN  Outcome: Ongoing 1/22/2021 1720 by Dilip Summers RN  Outcome: Ongoing  Goal: STG - patient uses gait belt during all transfers  1/23/2021 0353 by Malvin Turpin LPN  Outcome: Ongoing  1/23/2021 0344 by Malvin Turpin LPN  Outcome: Ongoing  1/22/2021 1720 by Dilip Summers RN  Outcome: Ongoing     Problem: DAILY CARE  Goal: Daily care needs are met  1/23/2021 0353 by Malvin Turpin LPN  Outcome: Ongoing  1/23/2021 0344 by Malvin Turpin LPN  Outcome: Ongoing  1/22/2021 1720 by Dilip Summers RN  Outcome: Ongoing     Problem: PAIN  Goal: Patient's pain/discomfort is manageable  1/23/2021 0353 by Malvin Turpin LPN  Outcome: Ongoing  1/23/2021 0344 by Malvin Turpin LPN  Outcome: Ongoing  1/22/2021 1720 by Dilip Summers RN  Outcome: Ongoing  Goal: STG - patients pain is managed to allow active participation in daily activities  1/23/2021 0353 by Malvin Turpin LPN  Outcome: Ongoing  1/23/2021 0344 by Malvin Turpin LPN  Outcome: Ongoing  1/22/2021 1720 by Dilip Summers RN  Outcome: Ongoing     Problem: SKIN INTEGRITY  Goal: Skin integrity is maintained or improved  1/23/2021 0353 by Malvin Turpin LPN  Outcome: Ongoing  1/23/2021 0344 by Malvin Turpin LPN  Outcome: Ongoing  1/22/2021 1720 by Dilip Summers RN  Outcome: Ongoing  Goal: STG - patient will maintain good skin integrity  1/23/2021 0353 by Malvin Turpin LPN  Outcome: Ongoing  1/23/2021 0344 by Malvin Turpin LPN  Outcome: Ongoing  1/22/2021 1720 by Dilip Summers RN  Outcome: Ongoing     Problem: KNOWLEDGE DEFICIT  Goal: Patient/S.O. demonstrates understanding of disease process, treatment plan, medications, and discharge instructions.   1/23/2021 0353 by Malvin Turpin LPN  Outcome: Ongoing  1/23/2021 0344 by Malvin Turpin LPN  Outcome: Ongoing  1/22/2021 1720 by Dilip Summers RN  Outcome: Ongoing     Problem: DISCHARGE BARRIERS  Goal: Patient's continuum of care needs are met  1/23/2021 0353 by Malvin Turpin LPN Outcome: Ongoing  1/23/2021 0344 by Arnold Del Castillo LPN  Outcome: Ongoing  1/22/2021 1720 by Susanna Dunn RN  Outcome: Ongoing     Problem: IP BLADDER/VOIDING  Goal: LTG - Patient will utilize adaptive techniques/equipment to complete bladder elimination  1/23/2021 0353 by Arnold Del Castillo LPN  Outcome: Ongoing  1/23/2021 0344 by Arnold Del Castillo LPN  Outcome: Ongoing  1/22/2021 1720 by Susanna Dunn RN  Outcome: Ongoing     Problem: IP BOWEL ELIMINATION  Goal: LTG - patient will have regular and routine bowel evacuation  1/23/2021 0353 by Arnold Del Castillo LPN  Outcome: Ongoing  1/23/2021 0344 by Arnold Del Castillo LPN  Outcome: Ongoing  1/22/2021 1720 by Susanna Dunn RN  Outcome: Ongoing     Problem: IP BREATHING  Goal: STG - respiratory rate and effort will be within normal limits for the patient  1/23/2021 0353 by Arnold Del Castillo LPN  Outcome: Ongoing  1/23/2021 0344 by Arnold Del Castillo LPN  Outcome: Ongoing  1/22/2021 1720 by Susanna Dunn RN  Outcome: Ongoing  Goal: STG - patient/caregiver will be able to verbalize oxygen safety precautions  1/23/2021 0353 by Arnold Del Castillo LPN  Outcome: Ongoing  1/23/2021 0344 by Arnold Del Castillo LPN  Outcome: Ongoing  1/22/2021 1720 by Susanna Dunn RN  Outcome: Ongoing  Goal: STG - patient will utilize incentive spirometer  1/23/2021 0353 by Arnold Del Castillo LPN  Outcome: Ongoing  1/23/2021 0344 by Arnold Del Castillo LPN  Outcome: Ongoing  1/22/2021 1720 by Susanna Dunn RN  Outcome: Ongoing     Problem: NUTRITION  Goal: Patient maintains adequate hydration  1/23/2021 0353 by Arnold Del Castillo LPN  Outcome: Ongoing  1/23/2021 0344 by Arnold Del Castillo LPN  Outcome: Ongoing  1/22/2021 1720 by Susanna Dunn RN  Outcome: Ongoing  Goal: Patient/Family demonstrates understanding of diet  1/23/2021 0353 by Arnold Del Castillo LPN  Outcome: Ongoing  1/23/2021 0344 by Arnold Del Castillo LPN  Outcome: Ongoing  1/22/2021 1720 by Susanna Dunn RN  Outcome: Ongoing Problem: Falls - Risk of:  Goal: Will remain free from falls  Description: Will remain free from falls  1/23/2021 0353 by Malvin Turpin LPN  Outcome: Ongoing  1/23/2021 0344 by Malvin Turpin LPN  Outcome: Ongoing  1/22/2021 1720 by Dilip Summers RN  Outcome: Ongoing  Goal: Absence of physical injury  Description: Absence of physical injury  1/23/2021 0353 by Malvin Turpin LPN  Outcome: Ongoing  1/23/2021 0344 by Malvin Turpin LPN  Outcome: Ongoing  1/22/2021 1720 by Dilip Summers RN  Outcome: Ongoing     Problem:  Activity:  Goal: Muscle strength will improve  Description: Muscle strength will improve  1/23/2021 0353 by Malvin Turpin LPN  Outcome: Ongoing  1/23/2021 0344 by Malvin Turpin LPN  Outcome: Ongoing  1/22/2021 1720 by Dilip Summers RN  Outcome: Ongoing  Goal: Ability to maintain optimal joint mobility will improve  Description: Ability to maintain optimal joint mobility will improve  1/23/2021 0353 by Malvin Turpin LPN  Outcome: Ongoing  1/23/2021 0344 by Malvin Turpin LPN  Outcome: Ongoing  1/22/2021 1720 by Dilip Summers RN  Outcome: Ongoing  Goal: Demonstration of safe techniques during patient transfers will improve  Description: Demonstration of safe techniques during patient transfers will improve  1/23/2021 0353 by Malvin Turpin LPN  Outcome: Ongoing  1/23/2021 0344 by Malvin Turpin LPN  Outcome: Ongoing  1/22/2021 1720 by Dilip Summers RN  Outcome: Ongoing     Problem: Safety:  Goal: Ability to remain free from injury will improve  Description: Ability to remain free from injury will improve  1/23/2021 0353 by Malvin Turpin LPN  Outcome: Ongoing  1/23/2021 0344 by Malvin Turpin LPN  Outcome: Ongoing  1/22/2021 1720 by Dilip Summers RN  Outcome: Ongoing     Problem: Pain:  Goal: Pain level will decrease  Description: Pain level will decrease  1/23/2021 0353 by Malvin Turpin LPN  Outcome: Ongoing  1/23/2021 0344 by Malvin Turpin LPN Outcome: Ongoing  1/22/2021 1720 by Jose Dorman RN  Outcome: Ongoing  Goal: Control of acute pain  Description: Control of acute pain  1/23/2021 0353 by Megan Tesfaye LPN  Outcome: Ongoing  1/23/2021 0344 by Megan Tesfaye LPN  Outcome: Ongoing  1/22/2021 1720 by Jose Dorman RN  Outcome: Ongoing  Goal: Control of chronic pain  Description: Control of chronic pain  1/23/2021 0353 by Megan Tesfaye LPN  Outcome: Ongoing  1/23/2021 0344 by Megan Tesfaye LPN  Outcome: Ongoing  1/22/2021 1720 by Jose Dorman RN  Outcome: Ongoing

## 2021-01-23 NOTE — PROGRESS NOTES
01/23/21 0838 01/23/21 0839 01/23/21 0841   Subjective   Subjective  --  States he has back pain with movement. --    Pain Screening   Patient Currently in Pain No  --   --    Bed Mobility   Rolling  --   --  Stand by assistance   Supine to Sit  --   --  Stand by assistance   Transfers   Sit to Stand  --   --   --    Stand to sit  --   --   --    Bed to Chair  --   --   --    Ambulation   Ambulation?  --   --   --    WB Status  --   --   --    Ambulation 1   Surface  --   --   --    Device  --   --   --    Assistance  --   --   --    Quality of Gait  --   --   --    Distance  --   --   --    Comments  --   --   --    Wheelchair Activities   Wheelchair Parts Management  --   --   --    Left Brakes Level of Assistance  --   --   --    Right Brakes Level of Assistance  --   --   --    Propulsion  --   --   --    Propulsion 1   Propulsion  --   --   --    Level  --   --   --    Method  --   --   --    Level of Assistance  --   --   --    Description/ Details  --   --   --    Distance  --   --   --    Conditions Requiring Skilled Therapeutic Intervention   Assessment  --   --   --    Activity Tolerance   Activity Tolerance  --   --   --       01/23/21 0844 01/23/21 0852 01/23/21 0856   Subjective   Subjective  --   --   --    Pain Screening   Patient Currently in Pain  --   --   --    Bed Mobility   Rolling  --   --   --    Supine to Sit  --   --   --    Transfers   Sit to Stand Stand by assistance  --   --    Stand to sit Stand by assistance  --   --    Bed to Chair Contact guard assistance  --   --    Ambulation   Ambulation? Yes  --   --    WB Status WBAT BLE'S  --   --    Ambulation 1   Surface level tile  --   --    Device Rolling Walker  --   --    Assistance Contact guard assistance;Stand by assistance  --   --    Quality of Gait mild forward flexed posture, reciprocal gait pattern  --   --    Distance 48' x2  --   --    Comments [employer]  turns.   --   --    Wheelchair Activities Wheelchair Parts Management Yes  --   --    Left Brakes Level of Assistance Minimal assistance  --   --    Right Brakes Level of Assistance Minimal assistance  --   --    Propulsion Yes  --   --    Propulsion 1   Propulsion Manual  --   --    Level Level Tile  --   --    Method RUE;LUE  --   --    Level of Assistance Stand by assistance  --   --    Description/ Details [employer] turns. --   --    Distance 80' x2  --   --    Conditions Requiring Skilled Therapeutic Intervention   Assessment  --  SPO2 on RA after amb was 91%.   96% at rest.  --    Activity Tolerance   Activity Tolerance  --   --  Patient limited by endurance   Electronically signed by Izabela Lindo PTA on 1/23/2021 at 9:06 AM

## 2021-01-23 NOTE — PROGRESS NOTES
Patient:   Aileen Del Real  MR#:    [id number]   Room:    [facility]/825-01   YOB: 1940  Date of Progress Note: 1/23/2021  Time of Note                           11:29 AM  Consulting Physician:   Boby Monroe M.D. Attending Physician:  Boby Monroe MD     Chief complaint Acute respiratory failure      S:This 79 y.o. male with a past medical history of bladder cancer, HTN, CKD stage III, PVD, hyperlipidemia,CAD,COPD not on home oxygen,ETOH use drinks 2 glasses of wine every night and former smoker. He presented to [facility] ER on 1/17/21 with lethargy and confusion. He was found to be Septic with acute respiratory failure with hypercapnia and bilateral pneumonia. He was intubated in ER and admitted to ICU under the hospitalist service. He was also found to have ANA and possible UTI. He was placed on IV  vancomycin and Zosyn. Patient was able to extubated on 1/19/21 and moved out of ICU on 1/20/21. He was still requiring oxygen at 3 l/m nc. He was evaluated by SPT for swallow and placed on a mechanical soft diet with nectar thick liquids. SPT also completed a cognitive eval. He was found to have  functional cognitive skills as evidenced by the Mini-Mental State Exam. Pt has minimal deficits with processing speed. Pt also has mild word-finding deficits in conversation that are resolved with a short response latency. Pt was observed with speech errors occasionally in spontaneous speech utterances. Pt did self-correct errors 1/2x. Sepsis and ANA have now resolved. He is participating in both PT/OT. He is felt to need a stay on Rehab to work towards his goal of returning home with his wife. He is now felt ready to start the Rehab program. No new complaints.      REVIEW OF SYSTEMS:  Constitutional: No fevers No chills  Neck:No stiffness  Respiratory: No shortness of breath  Cardiovascular: No chest pain No palpitations  Gastrointestinal: No abdominal pain    Genitourinary: No Dysuria Neurological: No headache, no confusion    Past Medical History:      Diagnosis Date    Acute liver failure without hepatic coma 10/23/2018    Back pain     \"with tired legs as a result\"    Bladder cancer ([facility] 12/19/2018    Blood circulation, collateral     Carotid arterial disease ([address]     recent surgery    CKD (chronic kidney disease), 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/2018    GERD (gastroesophageal reflux disease)     Hyperlipidemia     Hypertension     Hypertension     Palliative care patient 10/23/2018    Pneumonia due to infectious organism 11/06/2018    Primary osteoarthritis of left knee 10/14/2018    PVD (peripheral vascular disease) (HCC)     Tremor     Tremor on Right side x 1-2 weeks per stepdaughter    Type 2 diabetes mellitus with complication, without long-term current use of insulin ([facility] 1/21/2021       Past Surgical History:      Procedure Laterality Date    BACK SURGERY      COLONOSCOPY  2007?  CYSTOSCOPY Bilateral 12/19/2018    CYSTOSCOPY, BIOSPY FULGURATION OF BLADDER TUMOR POSSIBLE TURBT, RETROGRADE PYELOGRAM performed by Prince Rebekah MD at \[facility]\"" 44 COLONOSCOPY FLX DX W/COLLJ SPEC WHEN PFRMD N/A 9/11/2017    Dr Ailyn Liang internal hemorrhoids, diverticular disease-HP-No recall (age)   [place] REVISE MEDIAN N/CARPAL TUNNEL SURG Left 7/18/2018    OPEN CARPAL TUNNEL RELEASE performed by Wilfrid Emmanuel MD at [address] Left 8/28/2018    LEFT CAROTID ENDARTERECTOMY WITH VEIN PATCH ANGIOPLASTY AND COMPLETION ANGIOGRAM performed by Casey Sánchez MD at [address] 10/15/2018    LEFT COMPLEX TOTAL KNEE ARTHROPLASTY performed by Wilfrid Emmanuel MD at [facility] VASCULAR SURGERY  04/21/2015    Colt BESS Ultrasound guided access of left common femoral artery. Aortogram.Diagnostic right lower extremity arteriogram.Radiologic supervision and interpretation.  VASCULAR SURGERY  01/13/2015    Colt Minaya M.D Atherectomy,angioplasty,and stenting of left superficial femoral artery.  VASCULAR SURGERY  03/11/2014    Colt Minaya M.D. Ultrasound-guided access of right common femoral artery. Aortogram.Left lower extremity arteriogram.Atherectomy and angioplasty of left superficial femoral artery. Radiologic supervision and interpretation.  VASCULAR SURGERY  01/18/2013    Colt BESS Aortogram.Multistation arteriogram right lower extremity. Laser atherectomy and angioplasty of right superficial femoral artery. Selective catheterization of right tibioperoneal trunk. Angioplasty of peroneal artery and tibioperoneal trunk.  VASCULAR SURGERY  10/30/2018    SJS. Ultrasound guided cannulation of right internal vein. Placement of right internal jugular vein tunneled dialysis catheter bard equistream xk 23cm tip to cuff    VASCULAR SURGERY  12/17/2018    SJS. Removal of tunneled dilaysis catheter right internal jugular vein.        Medications in Hospital:      Current Facility-Administered Medications:     insulin lispro (HUMALOG) injection vial 0-6 Units, 0-6 Units, Subcutaneous, TID WC, Alessia Estrada MD    insulin lispro (HUMALOG) injection vial 0-3 Units, 0-3 Units, Subcutaneous, Nightly, Alessia Estrada MD    glipiZIDE (GLUCOTROL) tablet 5 mg, 5 mg, Oral, QAM AC, Alessia Estrada MD, 5 mg at 01/23/21 0630    azithromycin (ZITHROMAX) tablet 500 mg, 500 mg, Oral, Daily, Alessia Estrada MD, 500 mg at 01/23/21 9134    trospium (SANCTURA) tablet 20 mg, 20 mg, Oral, Nightly, Alessia Estrada MD, 20 mg at 01/22/21 2040    acetaminophen (TYLENOL) tablet 650 mg, 650 mg, Oral, Q6H PRN **OR** acetaminophen (TYLENOL) suppository 650 mg, 650 mg, Rectal, Q6H PRN, Amarilis Abebe MD    magnesium sulfate 2000 mg in 50 mL IVPB premix, 2 g, Intravenous, PRN, Amarilis Abebe MD    [facility]ansetron [facility]) injection 4 mg, 4 mg, Intravenous, Q6H PRN, Amarilis Abebe MD   polyethylene glycol (GLYCOLAX) packet 17 g, 17 g, Oral, Daily PRN, Terence Mcintosh MD    potassium chloride (KLOR-CON M) extended release tablet 40 mEq, 40 mEq, Oral, PRN **OR** potassium bicarb-citric acid (EFFER-K) effervescent tablet 40 mEq, 40 mEq, Oral, PRN **OR** potassium chloride 10 mEq/100 mL IVPB (Peripheral Line), 10 mEq, Intravenous, PRN, Terence Mcintosh MD    sodium chloride flush 0.9 % injection 10 mL, 10 mL, Intravenous, 2 times per day, Terence Mcintosh MD, 10 mL at 01/23/21 0842    sodium chloride flush 0.9 % injection 10 mL, 10 mL, Intravenous, PRN, Terence Mcintosh MD    dextrose 5 % solution, 100 mL/hr, Intravenous, PRN, Terence Mcintosh MD    dextrose 50 % IV solution, 12.5 g, Intravenous, PRN, Terence Mcintosh MD    glucagon (rDNA) injection 1 mg, 1 mg, Intramuscular, PRN, Terence Mcintosh MD    glucose (GLUTOSE) 40 % oral gel 15 g, 15 g, Oral, PRN, Terence Mcintosh MD    aspirin EC tablet 81 mg, 81 mg, Oral, Daily, Terence Mcintosh MD, 81 mg at 01/23/21 9489    atorvastatin (LIPITOR) tablet 20 mg, 20 mg, Oral, Daily, Terence Mcintosh MD, 20 mg at 01/23/21 7826    dextrose 50 % IV solution, 25 g, Intravenous, PRN, Terence Mcintosh MD    metoprolol succinate (TOPROL XL) extended release tablet 100 mg, 100 mg, Oral, Daily, Terence Mcintosh MD, 100 mg at 01/23/21 6421    NIFEdipine (PROCARDIA XL) extended release tablet 60 mg, 60 mg, Oral, Daily, Terence Mcintosh MD, 60 mg at 01/23/21 1838    pantoprazole (PROTONIX) tablet 40 mg, 40 mg, Oral, Daily, Terence Mcintosh MD, 40 mg at 01/23/21 0085    tamsulosin (FLOMAX) capsule 0.4 mg, 0.4 mg, Oral, Daily, Terence Mcintosh MD, 0.4 mg at 01/23/21 5114    acetaminophen (TYLENOL) tablet 650 mg, 650 mg, Oral, Q4H PRN, Ramu Stinson MD    enoxaparin (LOVENOX) injection 40 mg, 40 mg, Subcutaneous, Daily, Ramu Stinson MD, 40 mg at 01/23/21 0570    Allergies:  Eliquis [apixaban] and Promethazine hcl    Social History: TOBACCO:   reports that he quit smoking about 17 years ago. He has never used smokeless tobacco.  ETOH:   reports current alcohol use of about 12.0 standard drinks of alcohol per week. Family History:       Problem Relation Age of Onset    Colon Cancer Father     Diabetes Brother     Colon Polyps Neg Hx     Liver Cancer Neg Hx     Liver Disease Neg Hx     Esophageal Cancer Neg Hx     Rectal Cancer Neg Hx     Stomach Cancer Neg Hx          PHYSICAL EXAM:  /73   Pulse 79   Temp 98.6 °F (37 °C) (Temporal)   Resp 18   Ht 6' (1.829 m)   Wt 245 lb 0.3 oz (111.1 kg)   SpO2 90% Comment: Post shower and functional mobility  BMI 33.23 kg/m²     Constitutional  well developed, well nourished. Eyes  conjunctiva normal.   Ear, nose, throat - No scars, masses, or lesions over external nose or ears, no atrophy of tongue  Neck-symmetric, no masses noted, no jugular vein distension  Respiration- chest wall appears symmetric, good expansion,   normal effort without use of accessory muscles  Musculoskeletal  no significant wasting of muscles noted, no bony deformities  Extremities-no clubbing, cyanosis or edema  Skin  warm, dry, and intact. No rash, erythema, or pallor. Psychiatric  mood, affect, and behavior appear normal.      Neurological exam  Awake, alert, fluent oriented appropriate affect  Attention and concentration appear appropriate  Recent and remote memory appears unremarkable  Speech normal without dysarthria  No clear issues with language of fund of knowledge     Cranial Nerve Exam     CN III, IV,VI-EOMI, No nystagmus, conjugate eye movements, no ptosis    CN VII-no facial assymetry       Motor Exam  antigravity throughout upper and lower extremities bilaterally      Tremors- no tremors in hands or head noted     Gait  Not tested      Nursing/pcp notes, imaging,labs and vitals reviewed.      PT,OT and/or speech notes reviewed    Lab Results   Component Value Date    WBC 7.4 01/22/2021 HGB 12.5 (L) 01/22/2021    HCT 41.4 (L) 01/22/2021    MCV 93.0 01/22/2021     01/22/2021     Lab Results   Component Value Date     01/22/2021    K 3.6 01/22/2021     01/22/2021    CO2 27 01/22/2021    BUN 21 01/22/2021    CREATININE 1.6 (H) 01/22/2021    GLUCOSE 97 01/22/2021    CALCIUM 8.5 (L) 01/22/2021    PROT 5.7 (L) 01/22/2021    LABALBU 3.0 (L) 01/22/2021    BILITOT 0.7 01/22/2021    ALKPHOS 91 01/22/2021    AST 25 01/22/2021    ALT 24 01/22/2021    LABGLOM 42 (A) 01/22/2021    GFRAA 51 (L) 01/22/2021     Lab Results   Component Value Date    INR 1.16 01/21/2021    INR 1.08 09/01/2019    INR 1.02 06/10/2019    PROTIME 14.8 (H) 01/21/2021    PROTIME 13.4 09/01/2019    PROTIME 12.8 06/10/2019          Shira Wallace PTA   Physical Therapist Assistant   Specialty:  Physical Therapy   Progress Notes   Cosign Needed   Date of Service:  1/23/2021  9:06 AM               Cosign Needed             Show:Clear all  []Manual[x]Template[]Copied    Added by:  [x]Nilam Simpson PTA    []Dorian for details       01/23/21 0838 01/23/21 0839 01/23/21 0841   Subjective   Subjective  --  States he has back pain with movement.   --    Pain Screening   Patient Currently in Pain No  --   --    Bed Mobility   Rolling  --   --  Stand by assistance   Supine to Sit  --   --  Stand by assistance   Transfers   Sit to Stand  --   --   --    Stand to sit  --   --   --    Bed to Chair  --   --   --    Ambulation   Ambulation?  --   --   --    WB Status  --   --   --    Ambulation 1   Surface  --   --   --    Device  --   --   --    Assistance  --   --   --    Quality of Gait  --   --   --    Distance  --   --   --    Comments  --   --   --    Wheelchair Activities   Wheelchair Parts Management  --   --   --    Left Brakes Level of Assistance  --   --   --    Right Brakes Level of Assistance  --   --   --    Propulsion  --   --   --    Propulsion 1   Propulsion  --   --   --    Level  --   --   --    Method  --   --   -- Level of Assistance  --   --   --    Description/ Details  --   --   --    Distance  --   --   --    Conditions Requiring Skilled Therapeutic Intervention   Assessment  --   --   --    Activity Tolerance   Activity Tolerance  --   --   --         01/23/21 0844 01/23/21 0852 01/23/21 0856   Subjective   Subjective  --   --   --    Pain Screening   Patient Currently in Pain  --   --   --    Bed Mobility   Rolling  --   --   --    Supine to Sit  --   --   --    Transfers   Sit to Stand Stand by assistance  --   --    Stand to sit Stand by assistance  --   --    Bed to Chair Contact guard assistance  --   --    Ambulation   Ambulation? Yes  --   --    WB Status WBAT BLE'S  --   --    Ambulation 1   Surface level tile  --   --    Device Rolling Walker  --   --    Assistance Contact guard assistance;Stand by assistance  --   --    Quality of Gait mild forward flexed posture, reciprocal gait pattern  --   --    Distance 48' x2  --   --    Comments Incorporated  turns. --   --    Wheelchair Activities   Wheelchair Parts Management Yes  --   --    Left Brakes Level of Assistance Minimal assistance  --   --    Right Brakes Level of Assistance Minimal assistance  --   --    Propulsion Yes  --   --    Propulsion 1   Propulsion Manual  --   --    Level Level Tile  --   --    Method RUE;LUE  --   --    Level of Assistance Stand by assistance  --   --    Description/ Details Incorporated turns. --   --    Distance 80' x2  --   --    Conditions Requiring Skilled Therapeutic Intervention   Assessment  --  SPO2 on RA after amb was 91%. 96% at rest.  --    Activity Tolerance   Activity Tolerance  --   --  Patient limited by endurance   Electronically signed by Johnathon Toure PTA on 1/23/2021 at 9:06 AM                    RECORD REVIEW: Previous medical records, medications were reviewed at today's visit    IMPRESSION:   1. Acute respiratory failure/h/o COPD-off abx-monitor  2. CAD-ASA/statin  3.  Hyperlipidemia-on statin 4. DVT prophylaxis-Lovenox  5. GI-PPI/bowel regimen  6. DM-oral hypoglycemics-monitor BS  7. HTN-on meds monitor  8. BPH/history of bladder cancer-Flomax  9. Back pain/osteoarthritis-tylenol PRN  10. Acute on chronic renal failure-monitor  11. History of ETOH use  12.  PT/OT/Speech    Continue care      Expected duration and frequency therapy: 180 minutes per day, 5 days per week    [address] [facility], [place], operated by [facility]  [phone] CELL  Dr Ramu Stinson

## 2021-01-23 NOTE — PROGRESS NOTES
Occupational Therapy  Facility/Department: [facility] 8 REHAB UNIT  Daily Treatment Note  NAME: Lucrecia Mello  : 1940  MRN: [id number]    Date of Service: 2021    Assessment   Performance deficits / Impairments: Decreased endurance;Decreased functional mobility ; Decreased ADL status; Decreased balance;Decreased strength  Treatment Diagnosis: Hypoxemic respiratory failure, sepsis  Activity Tolerance  Activity Tolerance: Patient Tolerated treatment well  Safety Devices  Safety Devices in place: Yes  Type of devices: Bed alarm in place;Call light within reach         Patient Diagnosis(es): There were no encounter diagnoses. has a past medical history of Acute liver failure without hepatic coma, Back pain, Bladder cancer ([address] Blood circulation, collateral, Carotid arterial disease ([address] CKD (chronic kidney disease), stage II, GERD (gastroesophageal reflux disease), Hyperlipidemia, Hypertension, Hypertension, Palliative care patient, Pneumonia due to infectious organism, Primary osteoarthritis of left knee, PVD (peripheral vascular disease) ([address] Tremor, and Type 2 diabetes mellitus with complication, without long-term current use of insulin ([facility] [address] has a past surgical history that includes back surgery; Tonsillectomy and adenoidectomy; Colonoscopy (?); pr colonoscopy flx dx w/collj spec when pfrmd (N/A, 2017); pr revise median n/carpal tunnel surg (Left, 2018); pr thromboendartectmy neck,neck incis (Left, 2018); vascular surgery (2015); vascular surgery (2015); vascular surgery (2014); vascular surgery (2013); pr total knee arthroplasty (Left, 10/15/2018); vascular surgery (10/30/2018); vascular surgery (2018); and Cystoscopy (Bilateral, 2018).     Restrictions  Restrictions/Precautions  Restrictions/Precautions: Weight Bearing, Fall Risk  Subjective   General  Chart Reviewed: Yes, Orders  Patient assessed for rehabilitation services?: Yes Additional Pertinent Hx: COPD--no home O2, bladder CA  Family / Caregiver Present: No  Diagnosis: Hypoxemic respiratory failure, Sepsis, pneumonia  Vital Signs  Pulse: 79  Oxygen Therapy  SpO2: 90 %(Post shower and functional mobility)  Pulse Oximeter Device Mode: Intermittent  Pulse Oximeter Device Location: Left;Finger  O2 Device: None (Room air)   Objective    Balance  Sitting Balance: Supervision  Standing Balance: Contact guard assistance  Functional Mobility  Functional - Mobility Device: Rolling Walker  Activity: To/from bathroom  Assist Level: Contact guard assistance     Transfers  Sit to stand: Contact guard assistance  Stand to sit: Contact guard assistance        Plan   Plan  Current Treatment Recommendations: Self-Care / ADL, Safety Education & Training, Endurance Training, Strengthening, Functional Mobility Training, Balance Training, Equipment Evaluation, Education, & procurement, Patient/Caregiver Education & Training, Home Management Training       OutComes Score       01/23/21 0900 [id number] Needed Supervision or touching assistance   Comment CGA   CARE Score 4   Shower/Bathe Self   Assistance Needed Supervision or touching assistance   Comment Shower, CGA with standing aspects   CARE Score 4   Upper Body Dressing   Assistance Needed Setup or clean-up assistance   CARE Score 5   Lower Body Dressing   Assistance Needed Supervision or touching assistance   Comment CGA for standing aspects   CARE Score 4   Putting On/Taking Off Footwear   Assistance Needed Supervision or touching assistance   Comment Leg over knee tech   CARE Score 4                       01/23/21 0900   Toilet Transfer   Assistance Needed Supervision or touching assistance   Comment CGA   CARE Score 4                Goals  Short term goals  Time Frame for Short term goals: 1 week  Short term goal 1: complete overall toileting with supervision  Short term goal 2: complete LB dressing with supervision Short term goal 3: complete overall bathing with supervision  Short term goal 4: complete ambulatory home making task with supervision  Short term goal 5: complete 1-2 handed standing level task for 3 mins with supervision  Short term goal 6: complete HEP x 5 occasions in order to improve strength and endurance for ADLs  Long term goals  Time Frame for Long term goals : 10-14 days  Long term goal 1: complete overall bathing with modified independence  Long term goal 2: complete overall toileting with modified independence  Long term goal 3: complete simple ambulatory home making task with modified independence  Long term goal 4: complete overall dressing with modified independence  Long term goal 5: complete HEP with independence  Long term goals 6: pt/family verbalize DME  Patient Goals   Patient goals : go home       Therapy Time   Individual Concurrent Group Co-treatment   Time In 0900         Time Out 1000         Minutes 60         Timed Code Treatment Minutes: 75 Christiano Acevedo, OT

## 2021-01-24 LAB
GLUCOSE BLD-MCNC: 111 MG/DL (ref 70–99)
GLUCOSE BLD-MCNC: 145 MG/DL (ref 70–99)
GLUCOSE BLD-MCNC: 85 MG/DL (ref 70–99)
GLUCOSE BLD-MCNC: 97 MG/DL (ref 70–99)
PERFORMED ON: ABNORMAL
PERFORMED ON: ABNORMAL
PERFORMED ON: NORMAL
PERFORMED ON: NORMAL

## 2021-01-24 PROCEDURE — 1180000000 HC REHAB R&B

## 2021-01-24 PROCEDURE — 6360000002 HC RX W HCPCS: Performed by: PSYCHIATRY & NEUROLOGY

## 2021-01-24 PROCEDURE — 99232 SBSQ HOSP IP/OBS MODERATE 35: CPT | Performed by: PSYCHIATRY & NEUROLOGY

## 2021-01-24 PROCEDURE — 6370000000 HC RX 637 (ALT 250 FOR IP): Performed by: INTERNAL MEDICINE

## 2021-01-24 PROCEDURE — 2580000003 HC RX 258: Performed by: INTERNAL MEDICINE

## 2021-01-24 PROCEDURE — 82947 ASSAY GLUCOSE BLOOD QUANT: CPT

## 2021-01-24 PROCEDURE — 6370000000 HC RX 637 (ALT 250 FOR IP): Performed by: PSYCHIATRY & NEUROLOGY

## 2021-01-24 RX ADMIN — ATORVASTATIN CALCIUM 20 MG: 20 TABLET, FILM COATED ORAL at 08:12

## 2021-01-24 RX ADMIN — AZITHROMYCIN MONOHYDRATE 500 MG: 250 TABLET ORAL at 08:12

## 2021-01-24 RX ADMIN — METOPROLOL SUCCINATE 100 MG: 50 TABLET, EXTENDED RELEASE ORAL at 08:12

## 2021-01-24 RX ADMIN — NIFEDIPINE 60 MG: 60 TABLET, FILM COATED, EXTENDED RELEASE ORAL at 08:12

## 2021-01-24 RX ADMIN — SODIUM CHLORIDE, PRESERVATIVE FREE 10 ML: 5 INJECTION INTRAVENOUS at 20:45

## 2021-01-24 RX ADMIN — INSULIN LISPRO 1 UNITS: 100 INJECTION, SOLUTION INTRAVENOUS; SUBCUTANEOUS at 20:45

## 2021-01-24 RX ADMIN — TAMSULOSIN HYDROCHLORIDE 0.4 MG: 0.4 CAPSULE ORAL at 08:12

## 2021-01-24 RX ADMIN — GLIPIZIDE 5 MG: 5 TABLET ORAL at 06:02

## 2021-01-24 RX ADMIN — TROSPIUM CHLORIDE 20 MG: 20 TABLET, FILM COATED ORAL at 20:45

## 2021-01-24 RX ADMIN — ENOXAPARIN SODIUM 40 MG: 40 INJECTION SUBCUTANEOUS at 08:12

## 2021-01-24 RX ADMIN — ASPIRIN 81 MG: 81 TABLET, COATED ORAL at 08:12

## 2021-01-24 RX ADMIN — PANTOPRAZOLE SODIUM 40 MG: 40 TABLET, DELAYED RELEASE ORAL at 08:12

## 2021-01-24 RX ADMIN — SODIUM CHLORIDE, PRESERVATIVE FREE 10 ML: 5 INJECTION INTRAVENOUS at 08:18

## 2021-01-24 NOTE — PROGRESS NOTES
Patient:   Thuy Weller  MR#:    [id number]   Room:    0825/825-01   YOB: 1940  Date of Progress Note: 1/24/2021  Time of Note                           10:59 AM  Consulting Physician:   Dennis Francisco M.D. Attending Physician:  Dennis Francisco MD     Chief complaint Acute respiratory failure      S:This 79 y.o. male with a past medical history of bladder cancer, HTN, CKD stage III, PVD, hyperlipidemia,CAD,COPD not on home oxygen,ETOH use drinks 2 glasses of wine every night and former smoker. He presented to [address] [place] [address] ER on 1/17/21 with lethargy and confusion. He was found to be Septic with acute respiratory failure with hypercapnia and bilateral pneumonia. He was intubated in ER and admitted to ICU under the hospitalist service. He was also found to have ANA and possible UTI. He was placed on IV  vancomycin and Zosyn. Patient was able to extubated on 1/19/21 and moved out of ICU on 1/20/21. He was still requiring oxygen at 3 l/m nc. He was evaluated by SPT for swallow and placed on a mechanical soft diet with nectar thick liquids. SPT also completed a cognitive eval. He was found to have  functional cognitive skills as evidenced by the Mini-Mental State Exam. Pt has minimal deficits with processing speed. Pt also has mild word-finding deficits in conversation that are resolved with a short response latency. Pt was observed with speech errors occasionally in spontaneous speech utterances. Pt did self-correct errors 1/2x. Sepsis and ANA have now resolved. He is participating in both PT/OT. He is felt to need a stay on Rehab to work towards his goal of returning home with his wife. He is now felt ready to start the Rehab program. No new issues.     REVIEW OF SYSTEMS:  Constitutional: No fevers No chills  Neck:No stiffness  Respiratory: No shortness of breath  Cardiovascular: No chest pain No palpitations  Gastrointestinal: No abdominal pain    Genitourinary: No Dysuria Neurological: No headache, no confusion    Past Medical History:      Diagnosis Date    Acute liver failure without hepatic coma 10/23/2018    Back pain     \"with tired legs as a result\"    Bladder cancer ([facility] [address] 12/19/2018    Blood circulation, collateral     Carotid arterial disease ([address]     recent surgery    CKD (chronic kidney disease), 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/2018    GERD (gastroesophageal reflux disease)     Hyperlipidemia     Hypertension     Hypertension     Palliative care patient 10/23/2018    Pneumonia due to infectious organism 11/06/2018    Primary osteoarthritis of left knee 10/14/2018    PVD (peripheral vascular disease) (HCC)     Tremor     Tremor on Right side x 1-2 weeks per stepdaughter    Type 2 diabetes mellitus with complication, without long-term current use of insulin ([facility] [address] 1/21/2021       Past Surgical History:      Procedure Laterality Date    BACK SURGERY      COLONOSCOPY  2007?  CYSTOSCOPY Bilateral 12/19/2018    CYSTOSCOPY, BIOSPY FULGURATION OF BLADDER TUMOR POSSIBLE TURBT, RETROGRADE PYELOGRAM performed by Chance Gibbons MD at [facility] COLONOSCOPY FLX DX W/COLLJ SPEC WHEN PFRMD N/A 9/11/2017    Dr Santos Jasmine internal hemorrhoids, diverticular disease-HP-No recall (age)   [facility] REVISE MEDIAN N/CARPAL TUNNEL SURG Left 7/18/2018    OPEN CARPAL TUNNEL RELEASE performed by Beth Condon MD at [address] Left 8/28/2018    LEFT CAROTID ENDARTERECTOMY WITH VEIN PATCH ANGIOPLASTY AND COMPLETION ANGIOGRAM performed by Toña Medellin MD at [address] 10/15/2018    LEFT COMPLEX TOTAL KNEE ARTHROPLASTY performed by Beth Condon MD at [facility] VASCULAR SURGERY  04/21/2015    Kana BESS Ultrasound guided access of left common femoral artery. Aortogram.Diagnostic right lower extremity arteriogram.Radiologic supervision and interpretation.  VASCULAR SURGERY  01/13/2015    Severo Knack M.D Atherectomy,angioplasty,and stenting of left superficial femoral artery.  VASCULAR SURGERY  03/11/2014    Severo Knack M.D. Ultrasound-guided access of right common femoral artery. Aortogram.Left lower extremity arteriogram.Atherectomy and angioplasty of left superficial femoral artery. Radiologic supervision and interpretation.  VASCULAR SURGERY  01/18/2013    Severo Knack M. D. Aortogram.Multistation arteriogram right lower extremity. Laser atherectomy and angioplasty of right superficial femoral artery. Selective catheterization of right tibioperoneal trunk. Angioplasty of peroneal artery and tibioperoneal trunk.  VASCULAR SURGERY  10/30/2018    [facility]. Ultrasound guided cannulation of right internal vein. Placement of right internal jugular vein tunneled dialysis catheter bard equistream xk 23cm tip to cuff    VASCULAR SURGERY  12/17/2018    [facility]. Removal of tunneled dilaysis catheter right internal jugular vein.        Medications in Hospital:      Current Facility-Administered Medications:     insulin lispro (HUMALOG) injection vial 0-6 Units, 0-6 Units, Subcutaneous, TID WC, Jluis Davalos MD    insulin lispro (HUMALOG) injection vial 0-3 Units, 0-3 Units, Subcutaneous, Nightly, Julis Davalos MD    glipiZIDE (GLUCOTROL) tablet 5 mg, 5 mg, Oral, QAM AC, Jluis Davalos MD, 5 mg at 01/24/21 0602    trospium (SANCTURA) tablet 20 mg, 20 mg, Oral, Nightly, Jluis Davalos MD, 20 mg at 01/23/21 2103    acetaminophen (TYLENOL) tablet 650 mg, 650 mg, Oral, Q6H PRN **OR** acetaminophen (TYLENOL) suppository 650 mg, 650 mg, Rectal, Q6H PRN, Alexia Ceballos MD    magnesium sulfate 2000 mg in 50 mL IVPB premix, 2 g, Intravenous, PRN, Alexia Ceballos MD    [facility]ron [facility]) injection 4 mg, 4 mg, Intravenous, Q6H PRN, Alexia Ceballos MD    polyethylene glycol [facility]) packet 17 g, 17 g, Oral, Daily PRN, Alexia Ceballos MD   potassium chloride (KLOR-CON M) extended release tablet 40 mEq, 40 mEq, Oral, PRN **OR** potassium bicarb-citric acid (EFFER-K) effervescent tablet 40 mEq, 40 mEq, Oral, PRN **OR** potassium chloride 10 mEq/100 mL IVPB (Peripheral Line), 10 mEq, Intravenous, PRN, Shima Pennington MD    sodium chloride flush 0.9 % injection 10 mL, 10 mL, Intravenous, 2 times per day, Shima Pennington MD, 10 mL at 01/24/21 0818    sodium chloride flush 0.9 % injection 10 mL, 10 mL, Intravenous, PRN, Shima Pennington MD    dextrose 5 % solution, 100 mL/hr, Intravenous, PRN, Shima Pennington MD    dextrose 50 % IV solution, 12.5 g, Intravenous, PRN, Shima Pennington MD    glucagon (rDNA) injection 1 mg, 1 mg, Intramuscular, PRN, Shima Pennington MD    glucose (GLUTOSE) 40 % oral gel 15 g, 15 g, Oral, PRN, Shima Pennington MD    aspirin EC tablet 81 mg, 81 mg, Oral, Daily, Shima Pennington MD, 81 mg at 01/24/21 7108    atorvastatin (LIPITOR) tablet 20 mg, 20 mg, Oral, Daily, Shima Pennington MD, 20 mg at 01/24/21 0308    dextrose 50 % IV solution, 25 g, Intravenous, PRN, Shima Pennington MD    metoprolol succinate (TOPROL XL) extended release tablet 100 mg, 100 mg, Oral, Daily, Shima Pennington MD, 100 mg at 01/24/21 0249    NIFEdipine (PROCARDIA XL) extended release tablet 60 mg, 60 mg, Oral, Daily, Shima Pennington MD, 60 mg at 01/24/21 2414    pantoprazole (PROTONIX) tablet 40 mg, 40 mg, Oral, Daily, Shima Pennington MD, 40 mg at 01/24/21 3071    tamsulosin (FLOMAX) capsule 0.4 mg, 0.4 mg, Oral, Daily, Shima Pennington MD, 0.4 mg at 01/24/21 1208    acetaminophen (TYLENOL) tablet 650 mg, 650 mg, Oral, Q4H PRN, Lyla Carroll MD    enoxaparin (LOVENOX) injection 40 mg, 40 mg, Subcutaneous, Daily, Lyla Carroll MD, 40 mg at 01/24/21 0353    Allergies:  Eliquis [apixaban] and Promethazine hcl    Social History:   TOBACCO:   reports that he quit smoking about 17 years ago.  He has never used smokeless tobacco. ETOH:   reports current alcohol use of about 12.0 standard drinks of alcohol per week. Family History:       Problem Relation Age of Onset    Colon Cancer Father     Diabetes Brother     Colon Polyps Neg Hx     Liver Cancer Neg Hx     Liver Disease Neg Hx     Esophageal Cancer Neg Hx     Rectal Cancer Neg Hx     Stomach Cancer Neg Hx          PHYSICAL EXAM:  /72   Pulse 75   Temp 97 °F (36.1 °C) (Temporal)   Resp 17   Ht 6' (1.829 m)   Wt 244 lb (110.7 kg)   SpO2 93%   BMI 33.09 kg/m²     Constitutional  well developed, well nourished. Eyes  conjunctiva normal.   Ear, nose, throat - No scars, masses, or lesions over external nose or ears, no atrophy of tongue  Neck-symmetric, no masses noted, no jugular vein distension  Respiration- chest wall appears symmetric, good expansion,   normal effort without use of accessory muscles  Musculoskeletal  no significant wasting of muscles noted, no bony deformities  Extremities-no clubbing, cyanosis or edema  Skin  warm, dry, and intact. No rash, erythema, or pallor. Psychiatric  mood, affect, and behavior appear normal.      Neurological exam  Awake, alert, fluent oriented appropriate affect  Attention and concentration appear appropriate  Recent and remote memory appears unremarkable  Speech normal without dysarthria  No clear issues with language of fund of knowledge     Cranial Nerve Exam     CN III, IV,VI-EOMI, No nystagmus, conjugate eye movements, no ptosis    CN VII-no facial assymetry       Motor Exam  antigravity throughout upper and lower extremities bilaterally      Tremors- no tremors in hands or head noted     Gait  Not tested      Nursing/pcp notes, imaging,labs and vitals reviewed.      PT,OT and/or speech notes reviewed    Lab Results   Component Value Date    WBC 7.4 01/22/2021    HGB 12.5 (L) 01/22/2021    HCT 41.4 (L) 01/22/2021    MCV 93.0 01/22/2021     01/22/2021     Lab Results   Component Value Date  01/22/2021    K 3.6 01/22/2021     01/22/2021    CO2 27 01/22/2021    BUN 21 01/22/2021    CREATININE 1.6 (H) 01/22/2021    GLUCOSE 97 01/22/2021    CALCIUM 8.5 (L) 01/22/2021    PROT 5.7 (L) 01/22/2021    LABALBU 3.0 (L) 01/22/2021    BILITOT 0.7 01/22/2021    ALKPHOS 91 01/22/2021    AST 25 01/22/2021    ALT 24 01/22/2021    LABGLOM 42 (A) 01/22/2021    GFRAA 51 (L) 01/22/2021     Lab Results   Component Value Date    INR 1.16 01/21/2021    INR 1.08 09/01/2019    INR 1.02 06/10/2019    PROTIME 14.8 (H) 01/21/2021    PROTIME 13.4 09/01/2019    PROTIME 12.8 06/10/2019          Rylan Aj PTA   Physical Therapist Assistant   Specialty:  Physical Therapy   Progress Notes   Cosign Needed   Date of Service:  1/23/2021  9:06 AM               Cosign Needed             Show:Clear all  []Manual[x]Template[]Copied    Added by:  [x]Nilam Kelley PTA    []Dorian for details       01/23/21 0838 01/23/21 0839 01/23/21 0841   Subjective   Subjective  --  States he has back pain with movement.   --    Pain Screening   Patient Currently in Pain No  --   --    Bed Mobility   Rolling  --   --  Stand by assistance   Supine to Sit  --   --  Stand by assistance   Transfers   Sit to Stand  --   --   --    Stand to sit  --   --   --    Bed to Chair  --   --   --    Ambulation   Ambulation?  --   --   --    WB Status  --   --   --    Ambulation 1   Surface  --   --   --    Device  --   --   --    Assistance  --   --   --    Quality of Gait  --   --   --    Distance  --   --   --    Comments  --   --   --    Wheelchair Activities   Wheelchair Parts Management  --   --   --    Left Brakes Level of Assistance  --   --   --    Right Brakes Level of Assistance  --   --   --    Propulsion  --   --   --    Propulsion 1   Propulsion  --   --   --    Level  --   --   --    Method  --   --   --    Level of Assistance  --   --   --    Description/ Details  --   --   --    Distance  --   --   -- Conditions Requiring Skilled Therapeutic Intervention   Assessment  --   --   --    Activity Tolerance   Activity Tolerance  --   --   --         01/23/21 0844 01/23/21 0852 01/23/21 0856   Subjective   Subjective  --   --   --    Pain Screening   Patient Currently in Pain  --   --   --    Bed Mobility   Rolling  --   --   --    Supine to Sit  --   --   --    Transfers   Sit to Stand Stand by assistance  --   --    Stand to sit Stand by assistance  --   --    Bed to Chair Contact guard assistance  --   --    Ambulation   Ambulation? Yes  --   --    WB Status WBAT BLE'S  --   --    Ambulation 1   Surface level tile  --   --    Device Rolling Walker  --   --    Assistance Contact guard assistance;Stand by assistance  --   --    Quality of Gait mild forward flexed posture, reciprocal gait pattern  --   --    Distance 48' x2  --   --    Comments [employer]  turns. --   --    Wheelchair Activities   Wheelchair Parts Management Yes  --   --    Left Brakes Level of Assistance Minimal assistance  --   --    Right Brakes Level of Assistance Minimal assistance  --   --    Propulsion Yes  --   --    Propulsion 1   Propulsion Manual  --   --    Level Level Tile  --   --    Method RUE;LUE  --   --    Level of Assistance Stand by assistance  --   --    Description/ Details [employer] turns. --   --    Distance 80' x2  --   --    Conditions Requiring Skilled Therapeutic Intervention   Assessment  --  SPO2 on RA after amb was 91%. 96% at rest.  --    Activity Tolerance   Activity Tolerance  --   --  Patient limited by endurance   Electronically signed by Priyanka Solorio PTA on 1/23/2021 at 9:06 AM                    RECORD REVIEW: Previous medical records, medications were reviewed at today's visit    IMPRESSION:   1. Acute respiratory failure/h/o COPD-off abx-monitor  2. CAD-ASA/statin  3. Hyperlipidemia-on statin  4. DVT prophylaxis-Lovenox  5. GI-PPI/bowel regimen  6.  DM-oral hypoglycemics-monitor BS 7. HTN-on meds monitor  8. BPH/history of bladder cancer-Flomax  9. Back pain/osteoarthritis-tylenol PRN  10. Acute on chronic renal failure-monitor  11. History of ETOH use  12.  PT/OT/Speech    Continue current care      Expected duration and frequency therapy: 180 minutes per day, 5 days per week    [facility]  [phone] CELL  Dr Papo Radford

## 2021-01-24 NOTE — PLAN OF CARE
Problem: Gas Exchange - Impaired:  Goal: Levels of oxygenation will improve  Description: Levels of oxygenation will improve  1/24/2021 0049 by Benson Ulrich LPN  Outcome: Ongoing  1/23/2021 1410 by Magali Luna RN  Outcome: Ongoing     Problem: Mobility - Impaired:  Goal: Mobility will improve  Description: Mobility will improve  1/24/2021 0049 by Benson Ulrich LPN  Outcome: Ongoing  1/23/2021 1410 by Magali Luna RN  Outcome: Ongoing     Problem: SAFETY  Goal: Free from accidental physical injury  1/24/2021 0049 by Benson Ulrich LPN  Outcome: Ongoing  1/23/2021 1410 by Magali Luna RN  Outcome: Ongoing  Goal: Free from intentional harm  1/24/2021 0049 by Benson Ulrich LPN  Outcome: Ongoing  1/23/2021 1410 by Magali Luna RN  Outcome: Ongoing  Goal: LTG - Patient will demonstrate safety requirements appropriate to situation/environment  1/24/2021 0049 by Benson Ulrich LPN  Outcome: Ongoing  1/23/2021 1410 by Magali Luna RN  Outcome: Ongoing  Goal: LTG - patient will utilize safety techniques  1/24/2021 0049 by Benson Ulrich LPN  Outcome: Ongoing  1/23/2021 1410 by Magali Luna RN  Outcome: Ongoing  Goal: STG - patient locks brakes on wheelchair  1/24/2021 0049 by Benson Ulrich LPN  Outcome: Ongoing  1/23/2021 1410 by Magali Luna RN  Outcome: Ongoing  Goal: STG - Patient uses call light consistently to request assistance with transfers  1/24/2021 0049 by Benson Ulrich LPN  Outcome: Ongoing  1/23/2021 1410 by Magali Luna RN  Outcome: Ongoing  Goal: STG - patient uses gait belt during all transfers  1/24/2021 0049 by Benson Ulrich LPN  Outcome: Ongoing  1/23/2021 1410 by Magali Luna RN  Outcome: Ongoing     Problem: DAILY CARE  Goal: Daily care needs are met  1/24/2021 0049 by Benson Ulrich LPN  Outcome: Ongoing  1/23/2021/2021 1410 by Magali Luna, RN  Outcome: Ongoing     Problem: PAIN Goal: Patient's pain/discomfort is manageable  1/24/2021 0049 by Isidoro Richard LPN  Outcome: Ongoing  1/23/2021 1410 by Sapphire Castellon RN  Outcome: Ongoing  Goal: STG - patients pain is managed to allow active participation in daily activities  1/24/2021 0049 by Isidoro Richard LPN  Outcome: Ongoing  1/23/2021 1410 by Sapphire Castellon RN  Outcome: Ongoing     Problem: SKIN INTEGRITY  Goal: Skin integrity is maintained or improved  1/24/2021 0049 by Isidoro Richard LPN  Outcome: Ongoing  1/23/2021 1410 by Sapphire Castellon RN  Outcome: Ongoing  Goal: STG - patient will maintain good skin integrity  1/24/2021 0049 by Isidoro Richard LPN  Outcome: Ongoing  1/23/2021 1410 by Sapphire Castellon RN  Outcome: Ongoing     Problem: KNOWLEDGE DEFICIT  Goal: Patient/S.O. demonstrates understanding of disease process, treatment plan, medications, and discharge instructions.   1/24/2021 0049 by Isidoro Richard LPN  Outcome: Ongoing  1/23/2021 1410 by Sapphire Castellon RN  Outcome: Ongoing     Problem: DISCHARGE BARRIERS  Goal: Patient's continuum of care needs are met  1/24/2021 0049 by Isidoro Richard LPN  Outcome: Ongoing  1/23/2021 1410 by Sapphire Castellon RN  Outcome: Ongoing     Problem: IP BLADDER/VOIDING  Goal: LTG - Patient will utilize adaptive techniques/equipment to complete bladder elimination  1/24/2021 0049 by Isidoro Richard LPN  Outcome: Ongoing  1/23/2021 1410 by Sapphire Castellon RN  Outcome: Ongoing     Problem: IP BOWEL ELIMINATION  Goal: LTG - patient will have regular and routine bowel evacuation  1/24/2021 0049 by Isidoro Richard LPN  Outcome: Ongoing  1/23/2021 1410 by Sapphire Castellon RN  Outcome: Ongoing     Problem: IP BREATHING  Goal: STG - respiratory rate and effort will be within normal limits for the patient  1/24/2021 0049 by Isidoro Richard LPN  Outcome: Ongoing  1/23/2021 1410 by Sapphire Castellon RN  Outcome: Ongoing Goal: STG - patient/caregiver will be able to verbalize oxygen safety precautions  1/24/2021 0049 by Alanda Mohs, LPN  Outcome: Ongoing  1/23/2021 1410 by Claudia Olmedo RN  Outcome: Ongoing  Goal: STG - patient will utilize incentive spirometer  1/24/2021 0049 by Alanda Mohs, LPN  Outcome: Ongoing  1/23/2021 1410 by Claudia Olmedo RN  Outcome: Ongoing     Problem: NUTRITION  Goal: Patient maintains adequate hydration  1/24/2021 0049 by Alanda Mohs, LPN  Outcome: Ongoing  1/23/2021 1410 by Claudia Olmedo RN  Outcome: Ongoing  Goal: Patient/Family demonstrates understanding of diet  1/24/2021 0049 by Alanda Mohs, LPN  Outcome: Ongoing  1/23/2021 1410 by Claudia Olmedo RN  Outcome: Ongoing     Problem: Falls - Risk of:  Goal: Will remain free from falls  Description: Will remain free from falls  1/24/2021 0049 by Alanda Mohs, LPN  Outcome: Ongoing  1/23/2021 1410 by Claudia Olmedo RN  Outcome: Ongoing  Goal: Absence of physical injury  Description: Absence of physical injury  1/24/2021 0049 by Alanda Mohs, LPN  Outcome: Ongoing  1/23/2021 1410 by Claudia Olmedo RN  Outcome: Ongoing     Problem:  Activity:  Goal: Muscle strength will improve  Description: Muscle strength will improve  1/24/2021 0049 by Alanda Mohs, LPN  Outcome: Ongoing  1/23/2021 1410 by Claudia Olmedo RN  Outcome: Ongoing  Goal: Ability to maintain optimal joint mobility will improve  Description: Ability to maintain optimal joint mobility will improve  1/24/2021 0049 by Alanda Mohs, LPN  Outcome: Ongoing  1/23/2021 1410 by Claudia Olmedo RN  Outcome: Ongoing  Goal: Demonstration of safe techniques during patient transfers will improve  Description: Demonstration of safe techniques during patient transfers will improve  1/24/2021 0049 by Alanda Mohs, LPN  Outcome: Ongoing  1/23/2021 1410 by Claudia Olmedo RN  Outcome: Ongoing Problem: Safety:  Goal: Ability to remain free from injury will improve  Description: Ability to remain free from injury will improve  1/24/2021 0049 by Latasha Leyva LPN  Outcome: Ongoing  1/23/2021 1410 by Gogo Lam RN  Outcome: Ongoing     Problem: Pain:  Goal: Pain level will decrease  Description: Pain level will decrease  1/24/2021 0049 by Latasha Leyva LPN  Outcome: Ongoing  1/23/2021 1410 by Gogo Lam RN  Outcome: Ongoing  Goal: Control of acute pain  Description: Control of acute pain  1/24/2021 0049 by Latasha Leyva LPN  Outcome: Ongoing  1/23/2021 1410 by Gogo Lam RN  Outcome: Ongoing  Goal: Control of chronic pain  Description: Control of chronic pain  1/24/2021 0049 by Latasha Leyva LPN  Outcome: Ongoing  1/23/2021 1410 by Gogo Lam RN  Outcome: Ongoing

## 2021-01-25 LAB
ALBUMIN SERPL-MCNC: 3.5 G/DL (ref 3.5–5.2)
ALP BLD-CCNC: 87 U/L (ref 40–130)
ALT SERPL-CCNC: 26 U/L (ref 5–41)
ANION GAP SERPL CALCULATED.3IONS-SCNC: 11 MMOL/L (ref 7–19)
AST SERPL-CCNC: 25 U/L (ref 5–40)
BASOPHILS ABSOLUTE: 0 K/UL (ref 0–0.2)
BASOPHILS RELATIVE PERCENT: 0.5 % (ref 0–1)
BILIRUB SERPL-MCNC: 0.5 MG/DL (ref 0.2–1.2)
BUN BLDV-MCNC: 20 MG/DL (ref 8–23)
CALCIUM SERPL-MCNC: 8.3 MG/DL (ref 8.8–10.2)
CHLORIDE BLD-SCNC: 103 MMOL/L (ref 98–111)
CO2: 27 MMOL/L (ref 22–29)
CREAT SERPL-MCNC: 1.3 MG/DL (ref 0.5–1.2)
EOSINOPHILS ABSOLUTE: 0 K/UL (ref 0–0.6)
EOSINOPHILS RELATIVE PERCENT: 0 % (ref 0–5)
GFR AFRICAN AMERICAN: >59
GFR NON-AFRICAN AMERICAN: 53
GLUCOSE BLD-MCNC: 100 MG/DL (ref 74–109)
GLUCOSE BLD-MCNC: 132 MG/DL (ref 70–99)
GLUCOSE BLD-MCNC: 68 MG/DL (ref 70–99)
GLUCOSE BLD-MCNC: 93 MG/DL (ref 70–99)
GLUCOSE BLD-MCNC: 95 MG/DL (ref 70–99)
HCT VFR BLD CALC: 42.4 % (ref 42–52)
HEMOGLOBIN: 13.2 G/DL (ref 14–18)
IMMATURE GRANULOCYTES #: 0 K/UL
LYMPHOCYTES ABSOLUTE: 1.3 K/UL (ref 1.1–4.5)
LYMPHOCYTES RELATIVE PERCENT: 18.2 % (ref 20–40)
MCH RBC QN AUTO: 28.6 PG (ref 27–31)
MCHC RBC AUTO-ENTMCNC: 31.1 G/DL (ref 33–37)
MCV RBC AUTO: 92 FL (ref 80–94)
MONOCYTES ABSOLUTE: 0.7 K/UL (ref 0–0.9)
MONOCYTES RELATIVE PERCENT: 9.7 % (ref 0–10)
NEUTROPHILS ABSOLUTE: 5.2 K/UL (ref 1.5–7.5)
NEUTROPHILS RELATIVE PERCENT: 71.1 % (ref 50–65)
PDW BLD-RTO: 13.7 % (ref 11.5–14.5)
PERFORMED ON: ABNORMAL
PERFORMED ON: ABNORMAL
PERFORMED ON: NORMAL
PERFORMED ON: NORMAL
PLATELET # BLD: 152 K/UL (ref 130–400)
PMV BLD AUTO: 10.8 FL (ref 9.4–12.4)
POTASSIUM REFLEX MAGNESIUM: 4 MMOL/L (ref 3.5–5)
RBC # BLD: 4.61 M/UL (ref 4.7–6.1)
SODIUM BLD-SCNC: 141 MMOL/L (ref 136–145)
TOTAL PROTEIN: 5.4 G/DL (ref 6.6–8.7)
WBC # BLD: 7.4 K/UL (ref 4.8–10.8)

## 2021-01-25 PROCEDURE — 1180000000 HC REHAB R&B

## 2021-01-25 PROCEDURE — 2580000003 HC RX 258: Performed by: INTERNAL MEDICINE

## 2021-01-25 PROCEDURE — 99232 SBSQ HOSP IP/OBS MODERATE 35: CPT | Performed by: PSYCHIATRY & NEUROLOGY

## 2021-01-25 PROCEDURE — 97530 THERAPEUTIC ACTIVITIES: CPT

## 2021-01-25 PROCEDURE — 97110 THERAPEUTIC EXERCISES: CPT

## 2021-01-25 PROCEDURE — 97535 SELF CARE MNGMENT TRAINING: CPT

## 2021-01-25 PROCEDURE — 80053 COMPREHEN METABOLIC PANEL: CPT

## 2021-01-25 PROCEDURE — 82947 ASSAY GLUCOSE BLOOD QUANT: CPT

## 2021-01-25 PROCEDURE — 6360000002 HC RX W HCPCS: Performed by: PSYCHIATRY & NEUROLOGY

## 2021-01-25 PROCEDURE — 6370000000 HC RX 637 (ALT 250 FOR IP): Performed by: INTERNAL MEDICINE

## 2021-01-25 PROCEDURE — 97116 GAIT TRAINING THERAPY: CPT

## 2021-01-25 PROCEDURE — 85025 COMPLETE CBC W/AUTO DIFF WBC: CPT

## 2021-01-25 PROCEDURE — 36415 COLL VENOUS BLD VENIPUNCTURE: CPT

## 2021-01-25 PROCEDURE — 6370000000 HC RX 637 (ALT 250 FOR IP): Performed by: PSYCHIATRY & NEUROLOGY

## 2021-01-25 RX ADMIN — PANTOPRAZOLE SODIUM 40 MG: 40 TABLET, DELAYED RELEASE ORAL at 07:49

## 2021-01-25 RX ADMIN — GLIPIZIDE 5 MG: 5 TABLET ORAL at 06:06

## 2021-01-25 RX ADMIN — TROSPIUM CHLORIDE 20 MG: 20 TABLET, FILM COATED ORAL at 20:22

## 2021-01-25 RX ADMIN — ASPIRIN 81 MG: 81 TABLET, COATED ORAL at 07:49

## 2021-01-25 RX ADMIN — METOPROLOL SUCCINATE 100 MG: 50 TABLET, EXTENDED RELEASE ORAL at 07:49

## 2021-01-25 RX ADMIN — ATORVASTATIN CALCIUM 20 MG: 20 TABLET, FILM COATED ORAL at 07:49

## 2021-01-25 RX ADMIN — TAMSULOSIN HYDROCHLORIDE 0.4 MG: 0.4 CAPSULE ORAL at 07:49

## 2021-01-25 RX ADMIN — ENOXAPARIN SODIUM 40 MG: 40 INJECTION SUBCUTANEOUS at 07:50

## 2021-01-25 RX ADMIN — NIFEDIPINE 60 MG: 60 TABLET, FILM COATED, EXTENDED RELEASE ORAL at 07:49

## 2021-01-25 RX ADMIN — SODIUM CHLORIDE, PRESERVATIVE FREE 10 ML: 5 INJECTION INTRAVENOUS at 23:07

## 2021-01-25 RX ADMIN — SODIUM CHLORIDE, PRESERVATIVE FREE 10 ML: 5 INJECTION INTRAVENOUS at 07:54

## 2021-01-25 ASSESSMENT — PAIN DESCRIPTION - FREQUENCY: FREQUENCY: CONTINUOUS

## 2021-01-25 ASSESSMENT — PAIN DESCRIPTION - PAIN TYPE
TYPE: CHRONIC PAIN
TYPE: CHRONIC PAIN

## 2021-01-25 ASSESSMENT — PAIN SCALES - GENERAL
PAINLEVEL_OUTOF10: 0
PAINLEVEL_OUTOF10: 5

## 2021-01-25 ASSESSMENT — PAIN DESCRIPTION - ONSET: ONSET: ON-GOING

## 2021-01-25 ASSESSMENT — PAIN DESCRIPTION - LOCATION
LOCATION: BACK
LOCATION: BACK

## 2021-01-25 ASSESSMENT — PAIN DESCRIPTION - PROGRESSION: CLINICAL_PROGRESSION: NOT CHANGED

## 2021-01-25 ASSESSMENT — PAIN DESCRIPTION - DESCRIPTORS: DESCRIPTORS: ACHING

## 2021-01-25 NOTE — PLAN OF CARE
Problem: SAFETY  Goal: Free from accidental physical injury  1/25/2021 0959 by Jeanne Kwan RN  Outcome: Ongoing  1/24/2021 2142 by Karina Mayen LPN  Outcome: Ongoing   Up with 1 assist with walker

## 2021-01-25 NOTE — PLAN OF CARE
Problem: Gas Exchange - Impaired:  Goal: Levels of oxygenation will improve  Description: Levels of oxygenation will improve  Outcome: Ongoing     Problem: Mobility - Impaired:  Goal: Mobility will improve  Description: Mobility will improve  Outcome: Ongoing     Problem: SAFETY  Goal: Free from accidental physical injury  Outcome: Ongoing  Goal: Free from intentional harm  Outcome: Ongoing  Goal: LTG - Patient will demonstrate safety requirements appropriate to situation/environment  Outcome: Ongoing  Goal: LTG - patient will utilize safety techniques  Outcome: Ongoing  Goal: STG - patient locks brakes on wheelchair  Outcome: Ongoing  Goal: STG - Patient uses call light consistently to request assistance with transfers  Outcome: Ongoing  Goal: STG - patient uses gait belt during all transfers  Outcome: Ongoing     Problem: DAILY CARE  Goal: Daily care needs are met  Outcome: Ongoing     Problem: PAIN  Goal: Patient's pain/discomfort is manageable  Outcome: Ongoing  Goal: STG - patients pain is managed to allow active participation in daily activities  Outcome: Ongoing     Problem: SKIN INTEGRITY  Goal: Skin integrity is maintained or improved  Outcome: Ongoing  Goal: STG - patient will maintain good skin integrity  Outcome: Ongoing     Problem: KNOWLEDGE DEFICIT  Goal: Patient/S.O. demonstrates understanding of disease process, treatment plan, medications, and discharge instructions.   Outcome: Ongoing     Problem: DISCHARGE BARRIERS  Goal: Patient's continuum of care needs are met  Outcome: Ongoing     Problem: IP BLADDER/VOIDING  Goal: LTG - Patient will utilize adaptive techniques/equipment to complete bladder elimination  Outcome: Ongoing     Problem: IP BOWEL ELIMINATION  Goal: LTG - patient will have regular and routine bowel evacuation  Outcome: Ongoing     Problem: IP BREATHING  Goal: STG - respiratory rate and effort will be within normal limits for the patient  Outcome: Ongoing

## 2021-01-25 NOTE — PROGRESS NOTES
Blood glucose 68, refused glucose gel but did take 4 ounces of orange juice with 1 packet of sugar in it

## 2021-01-25 NOTE — PROGRESS NOTES
Occupational Therapy     01/25/21 1000   General   Diagnosis Hypoxemic respiratory failure, Sepsis, pneumonia   Pain Assessment   Patient Currently in Pain Yes   Pain Assessment 0-10   Pain Level 5   Pain Type Chronic pain   Pain Location Back   Pain Descriptors Aching   Pain Frequency Continuous   Clinical Progression Not changed   Response to Pain Intervention Patient Satisfied   Balance   Sitting Balance Independent   Standing Balance Supervision   Functional Mobility   Functional - Mobility Device Rolling Walker   Activity To/from bathroom; Other;Retrieve items;Transport items   Assist Level Stand by assistance   Functional Mobility Comments Picked up objects from the floor utilizing a reacher, transported clothes to closet. Transfers   Sit to stand Stand by assistance   Stand to sit Supervision   Shower Transfers   Shower - Transfer From [employer] - Transfer Type To and From   [employer] - Transfer To Transfer tub bench   Shower - Technique Ambulating   Shower Transfers Stand by assistance   Type of ROM/Therapeutic Exercise   Type of ROM/Therapeutic Exercise Cane/Wand;Mary Alice   Comment Cane/wand: 2# 1 set x 20 reps. Rickshaw: 15# 3 sets x 10 reps. Assessment   Performance deficits / Impairments Decreased endurance;Decreased functional mobility ; Decreased ADL status; Decreased balance;Decreased strength   Treatment Diagnosis Hypoxemic respiratory failure, sepsis   Prognosis Good   Timed Code Treatment Minutes 60 Minutes   Activity Tolerance   Activity Tolerance Patient Tolerated treatment well   Safety Devices   Safety Devices in place Yes   Type of devices Call light within reach; Bed alarm in place   Plan   Current Treatment Recommendations Self-Care / ADL; Safety Education & Training; Endurance Training;Strengthening; Functional Mobility Training;Balance Training;Equipment Evaluation, Education, & procurement;Patient/Caregiver Education & Training;Home Management Training

## 2021-01-25 NOTE — PROGRESS NOTES
Patient:   Nola Luna  MR#:    [id number]   Room:    [facility]825-   YOB: 1940  Date of Progress Note: 1/25/2021  Time of Note                           9:55 AM  Consulting Physician:   Maddison Ryan M.D. Attending Physician:  Maddison Ryan MD     Chief complaint Acute respiratory failure      S:This 79 y.o. male with a past medical history of bladder cancer, HTN, CKD stage III, PVD, hyperlipidemia,CAD,COPD not on home oxygen,ETOH use drinks 2 glasses of wine every night and former smoker. He presented to [facility] ER on 1/17/21 with lethargy and confusion. He was found to be Septic with acute respiratory failure with hypercapnia and bilateral pneumonia. He was intubated in ER and admitted to ICU under the hospitalist service. He was also found to have ANA and possible UTI. He was placed on IV  vancomycin and Zosyn. Patient was able to extubated on 1/19/21 and moved out of ICU on 1/20/21. He was still requiring oxygen at 3 l/m nc. He was evaluated by SPT for swallow and placed on a mechanical soft diet with nectar thick liquids. SPT also completed a cognitive eval. He was found to have  functional cognitive skills as evidenced by the Mini-Mental State Exam. Pt has minimal deficits with processing speed. Pt also has mild word-finding deficits in conversation that are resolved with a short response latency. Pt was observed with speech errors occasionally in spontaneous speech utterances. Pt did self-correct errors 1/2x. Sepsis and ANA have now resolved. He is participating in both PT/OT. He is felt to need a stay on Rehab to work towards his goal of returning home with his wife. He is now felt ready to start the Rehab program. No acute issues.     REVIEW OF SYSTEMS:  Constitutional: No fevers No chills  Neck:No stiffness  Respiratory: No shortness of breath  Cardiovascular: No chest pain No palpitations  Gastrointestinal: No abdominal pain    Genitourinary: No Dysuria Neurological: No headache, no confusion    Past Medical History:      Diagnosis Date    Acute liver failure without hepatic coma 10/23/2018    Back pain     \"with tired legs as a result\"    Bladder cancer ([facility] [address] 12/19/2018    Blood circulation, collateral     Carotid arterial disease ([address]     recent surgery    CKD (chronic kidney disease), 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/2018    GERD (gastroesophageal reflux disease)     Hyperlipidemia     Hypertension     Hypertension     Palliative care patient 10/23/2018    Pneumonia due to infectious organism 11/06/2018    Primary osteoarthritis of left knee 10/14/2018    PVD (peripheral vascular disease) (HCC)     Tremor     Tremor on Right side x 1-2 weeks per stepdaughter    Type 2 diabetes mellitus with complication, without long-term current use of insulin ([facility] [address] 1/21/2021       Past Surgical History:      Procedure Laterality Date    BACK SURGERY      COLONOSCOPY  2007?  CYSTOSCOPY Bilateral 12/19/2018    CYSTOSCOPY, BIOSPY FULGURATION OF BLADDER TUMOR POSSIBLE TURBT, RETROGRADE PYELOGRAM performed by Janeth Mazariegos MD at [facility] COLONOSCOPY FLX DX W/COLLJ SPEC WHEN PFRMD N/A 9/11/2017    Dr Michelle Curiel internal hemorrhoids, diverticular disease-HP-No recall (age)   [facility] [place] REVISE MEDIAN N/CARPAL TUNNEL SURG Left 7/18/2018    OPEN CARPAL TUNNEL RELEASE performed by Red Lopez MD at [address] Left 8/28/2018    LEFT CAROTID ENDARTERECTOMY WITH VEIN PATCH ANGIOPLASTY AND COMPLETION ANGIOGRAM performed by Nita Del Toro MD at [place] [id number] Left 10/15/2018    LEFT COMPLEX TOTAL KNEE ARTHROPLASTY performed by Red Lopez MD at [facility] VASCULAR SURGERY  04/21/2015    Andrey BESS Ultrasound guided access of left common femoral artery. Aortogram.Diagnostic right lower extremity arteriogram.Radiologic supervision and interpretation.  VASCULAR SURGERY  01/13/2015    Lorenzo Anaya M.D Atherectomy,angioplasty,and stenting of left superficial femoral artery.  VASCULAR SURGERY  03/11/2014    Lorenzo Anaya M.D. Ultrasound-guided access of right common femoral artery. Aortogram.Left lower extremity arteriogram.Atherectomy and angioplasty of left superficial femoral artery. Radiologic supervision and interpretation.  VASCULAR SURGERY  01/18/2013    Lorenzo LIU. Aortogram.Multistation arteriogram right lower extremity. Laser atherectomy and angioplasty of right superficial femoral artery. Selective catheterization of right tibioperoneal trunk. Angioplasty of peroneal artery and tibioperoneal trunk.  VASCULAR SURGERY  10/30/2018    SJS. Ultrasound guided cannulation of right internal vein. Placement of right internal jugular vein tunneled dialysis catheter bard equistream xk 23cm tip to cuff    VASCULAR SURGERY  12/17/2018    SJS. Removal of tunneled dilaysis catheter right internal jugular vein.        Medications in Hospital:      Current Facility-Administered Medications:     insulin lispro (HUMALOG) injection vial 0-6 Units, 0-6 Units, Subcutaneous, TID WC, Claude Bologna, MD    insulin lispro (HUMALOG) injection vial 0-3 Units, 0-3 Units, Subcutaneous, Nightly, Claude Bologna, MD, 1 Units at 01/24/21 2045    glipiZIDE (GLUCOTROL) tablet 5 mg, 5 mg, Oral, QAM AC, Claude Bologna, MD, 5 mg at 01/25/21 0606    trospium (SANCTURA) tablet 20 mg, 20 mg, Oral, Nightly, Claude Bologna, MD, 20 mg at 01/24/21 2045    acetaminophen (TYLENOL) tablet 650 mg, 650 mg, Oral, Q6H PRN **OR** acetaminophen (TYLENOL) suppository 650 mg, 650 mg, Rectal, Q6H PRN, Rosa Whitten MD    magnesium sulfate 2000 mg in 50 mL IVPB premix, 2 g, Intravenous, PRN, Rosa Whitten MD    [facility]etron [facility]) injection 4 mg, 4 mg, Intravenous, Q6H PRN, Rosa Whitten MD    polyethylene glycol [facility]) packet 17 g, 17 g, Oral, Daily PRN, Rosa Whitten MD   potassium chloride (KLOR-CON M) extended release tablet 40 mEq, 40 mEq, Oral, PRN **OR** potassium bicarb-citric acid (EFFER-K) effervescent tablet 40 mEq, 40 mEq, Oral, PRN **OR** potassium chloride 10 mEq/100 mL IVPB (Peripheral Line), 10 mEq, Intravenous, PRN, Higinio Luu MD    sodium chloride flush 0.9 % injection 10 mL, 10 mL, Intravenous, 2 times per day, Higinio Luu MD, 10 mL at 01/25/21 0754    sodium chloride flush 0.9 % injection 10 mL, 10 mL, Intravenous, PRN, Higinio Luu MD    dextrose 5 % solution, 100 mL/hr, Intravenous, PRN, Higinio Luu MD    dextrose 50 % IV solution, 12.5 g, Intravenous, PRN, Higinio Luu MD    glucagon (rDNA) injection 1 mg, 1 mg, Intramuscular, PRN, Higinio Luu MD    glucose (GLUTOSE) 40 % oral gel 15 g, 15 g, Oral, PRN, Higinio Luu MD    aspirin EC tablet 81 mg, 81 mg, Oral, Daily, Higinio Luu MD, 81 mg at 01/25/21 0749    atorvastatin (LIPITOR) tablet 20 mg, 20 mg, Oral, Daily, Higinio Luu MD, 20 mg at 01/25/21 0749    dextrose 50 % IV solution, 25 g, Intravenous, PRN, Higinio Luu MD    metoprolol succinate (TOPROL XL) extended release tablet 100 mg, 100 mg, Oral, Daily, Higinio Luu MD, 100 mg at 01/25/21 0749    NIFEdipine (PROCARDIA XL) extended release tablet 60 mg, 60 mg, Oral, Daily, Higinio Luu MD, 60 mg at 01/25/21 0749    pantoprazole (PROTONIX) tablet 40 mg, 40 mg, Oral, Daily, Higinio Luu MD, 40 mg at 01/25/21 0749    tamsulosin (FLOMAX) capsule 0.4 mg, 0.4 mg, Oral, Daily, Higinio Luu MD, 0.4 mg at 01/25/21 0749    acetaminophen (TYLENOL) tablet 650 mg, 650 mg, Oral, Q4H PRN, Jodie Conde MD    enoxaparin (LOVENOX) injection 40 mg, 40 mg, Subcutaneous, Daily, Jodie Conde MD, 40 mg at 01/25/21 0750    Allergies:  Eliquis [apixaban] and Promethazine hcl    Social History:   TOBACCO:   reports that he quit smoking about 17 years ago.  He has never used smokeless tobacco. ETOH:   reports current alcohol use of about 12.0 standard drinks of alcohol per week. Family History:       Problem Relation Age of Onset    Colon Cancer Father     Diabetes Brother     Colon Polyps Neg Hx     Liver Cancer Neg Hx     Liver Disease Neg Hx     Esophageal Cancer Neg Hx     Rectal Cancer Neg Hx     Stomach Cancer Neg Hx          PHYSICAL EXAM:  BP (!) 152/81   Pulse 82   Temp 97.4 °F (36.3 °C) (Temporal)   Resp 18   Ht 6' (1.829 m)   Wt 244 lb (110.7 kg)   SpO2 92%   BMI 33.09 kg/m²     Constitutional  well developed, well nourished. Eyes  conjunctiva normal.   Ear, nose, throat - No scars, masses, or lesions over external nose or ears, no atrophy of tongue  Neck-symmetric, no masses noted, no jugular vein distension  Respiration- chest wall appears symmetric, good expansion,   normal effort without use of accessory muscles  Musculoskeletal  no significant wasting of muscles noted, no bony deformities  Extremities-no clubbing, cyanosis or edema  Skin  warm, dry, and intact. No rash, erythema, or pallor. Psychiatric  mood, affect, and behavior appear normal.      Neurological exam  Awake, alert, fluent oriented appropriate affect  Attention and concentration appear appropriate  Recent and remote memory appears unremarkable  Speech normal without dysarthria  No clear issues with language of fund of knowledge     Cranial Nerve Exam     CN III, IV,VI-EOMI, No nystagmus, conjugate eye movements, no ptosis    CN VII-no facial assymetry       Motor Exam  antigravity throughout upper and lower extremities bilaterally      Tremors- no tremors in hands or head noted     Gait  Not tested      Nursing/pcp notes, imaging,labs and vitals reviewed.      PT,OT and/or speech notes reviewed    Lab Results   Component Value Date    WBC 7.4 01/25/2021    HGB 13.2 (L) 01/25/2021    HCT 42.4 01/25/2021    MCV 92.0 01/25/2021     01/25/2021     Lab Results   Component Value Date Right Brakes Level of Assistance  --  Stand by Assist;Independent  --    Propulsion  --  Yes  --    Propulsion 1   Propulsion  --  Manual  --    Level  --  Level Tile  --    Method  --  RUE;LUE  --    Level of Assistance  --  Stand by assistance; Independent  --    Description/ Details  --  Incorporated turns. --    Distance  --  100'  --    Conditions Requiring Skilled Therapeutic Intervention   Assessment  --   --   --    Activity Tolerance   Activity Tolerance  --   --  Patient Tolerated treatment well   Safety Devices   Type of devices  --   --   --         01/25/21 0849 01/25/21 0900   Pain Screening   Patient Currently in Pain  --   --    Bed Mobility   Rolling  --   --    Supine to Sit  --   --    Sit to Supine  --  Modified independent   Scooting  --  Modified independent   Transfers   Sit to Stand  --   --    Stand to sit  --   --    Bed to Chair  --   --    Ambulation   Ambulation?  --   --    WB Status  --   --    Ambulation 1   Surface  --   --    Device  --   --    Assistance  --   --    Quality of Gait  --   --    Distance  --   --    Comments  --   --    Wheelchair Activities   Wheelchair Parts Management  --   --    Left Leg Rest Level of Assistance  --   --    Right Leg Rest Level of Assistance  --   --    Left Brakes Level of Assistance  --   --    Right Brakes Level of Assistance  --   --    Propulsion  --   --    Propulsion 1   Propulsion  --   --    Level  --   --    Method  --   --    Level of Assistance  --   --    Description/ Details  --   --    Distance  --   --    Conditions Requiring Skilled Therapeutic Intervention   Assessment Increased amb distance this session. --    Activity Tolerance   Activity Tolerance  --   --    Safety Devices   Type of devices  --  Call light within reach; Bed alarm in place   Electronically signed by Warren Chávez PTA on 1/25/2021 at 9:05 AM                    RECORD REVIEW: Previous medical records, medications were reviewed at today's visit    IMPRESSION: 1. Acute respiratory failure/h/o COPD-off abx-monitor  2. CAD-ASA/statin  3. Hyperlipidemia-on statin  4. DVT prophylaxis-Lovenox  5. GI-PPI/bowel regimen  6. DM-oral hypoglycemics-monitor BS  7. HTN-on meds monitor  8. BPH/history of bladder cancer-Flomax  9. Back pain/osteoarthritis-tylenol PRN  10. Acute on chronic renal failure-monitor  11. History of ETOH use  12.  PT/OT/Speech    Continue present care      Expected duration and frequency therapy: 180 minutes per day, 5 days per week    [address] [facility]  [phone] CELL  Dr Claude Bologna

## 2021-01-25 NOTE — PLAN OF CARE
Nutrition Problem #1: Overweight/Obese  Intervention: Food and/or Nutrient Delivery: Continue Current Diet  Nutritional Goals: Maintain PO >50%, wt stable

## 2021-01-25 NOTE — PROGRESS NOTES
01/25/21 1345   Restrictions/Precautions   Restrictions/Precautions Weight Bearing; Fall Risk   Lower Extremity Weight Bearing Restrictions   Right Lower Extremity Weight Bearing Weight Bearing As Tolerated   Left Lower Extremity Weight Bearing Weight Bearing As Tolerated   General   Chart Reviewed Yes   Additional Pertinent Hx HTN, ACUTE KIDNEY FAILURE, PNEUMONIA, GERD, PVD, OA L KNEE, ACUTE LIVER FAILURE WITH COMA   Pain Assessment   Pain Assessment 0-10   Pain Level 0   Vital Signs   Level of Consciousness Alert (0)   Oxygen Therapy   SpO2 95 %   Pulse Oximeter Device Mode Intermittent   Pulse Oximeter Device Location Right;Finger   Bed Mobility   Rolling Independent   Supine to Sit Independent   Sit to Supine Independent   Scooting Independent   Transfers   Sit to Stand Stand by assistance   Stand to sit Stand by assistance   Bed to Chair Stand by assistance   Ambulation 1   Surface level tile   Device Rolling Walker   Other Apparatus Wheelchair follow   Assistance Stand by assistance   Quality of Gait mild forward flexed posture, reciprocal gait pattern   Distance 200' x 2   Comments Pt. performed ambulation w/o O2 dropping below normal levels. Propulsion 1   Propulsion Manual   Level Level Tile   Method RUE;LUE   Level of Assistance Independent   Description/ [employer] turns. Distance 200'   Exercises   Comments Sitting BLE ex x15 reps. Conditions Requiring Skilled Therapeutic Intervention   Body structures, Functions, Activity limitations Decreased functional mobility ; Decreased strength;Decreased endurance;Decreased balance;Decreased coordination;Decreased safe awareness   Assessment Pt. increased amb. distance this session. Independent/SBA for all transfers and bed mobility. O2 levels stayed WNL during session. Activity Tolerance   Activity Tolerance Patient Tolerated treatment well   Safety Devices   Type of devices Call light within reach; Bed alarm in place

## 2021-01-25 NOTE — PROGRESS NOTES
01/25/21 0841 01/25/21 0842 01/25/21 0848   Pain Screening   Patient Currently in Pain No  --   --    Bed Mobility   Rolling  --  Modified independent  --    Supine to Sit  --  Modified independent  --    Sit to Supine  --   --   --    Scooting  --   --   --    Transfers   Sit to Stand  --  Stand by assistance; Independent  --    Stand to sit  --  Stand by assistance; Independent  --    Bed to Chair  --  Stand by assistance  --    Ambulation   Ambulation?  --  Yes  --    WB Status  --  WBAT BLE'S  --    Ambulation 1   Surface  --  level tile  --    Device  --  Rolling Walker  --    Assistance  --  Stand by assistance  --    Quality of Gait  --  mild forward flexed posture, reciprocal gait pattern  --    Distance  --  150' x2  --    Comments  --  Incorporated  turns. --    Wheelchair Activities   Wheelchair Parts Management  --  Yes  --    Left Leg Rest Level of Assistance  --  Stand by assistance  --    Right Leg Rest Level of Assistance  --  Stand by assistance  --    Left Brakes Level of Assistance  --  Stand by assistance; Independent  --    Right Brakes Level of Assistance  --  Stand by Assist;Independent  --    Propulsion  --  Yes  --    Propulsion 1   Propulsion  --  Manual  --    Level  --  Level Tile  --    Method  --  RUE;LUE  --    Level of Assistance  --  Stand by assistance; Independent  --    Description/ Details  --  Incorporated turns.   --    Distance  --  100'  --    Conditions Requiring Skilled Therapeutic Intervention   Assessment  --   --   --    Activity Tolerance   Activity Tolerance  --   --  Patient Tolerated treatment well   Safety Devices   Type of devices  --   --   --       01/25/21 0849 01/25/21 0900   Pain Screening   Patient Currently in Pain  --   --    Bed Mobility   Rolling  --   --    Supine to Sit  --   --    Sit to Supine  --  Modified independent   Scooting  --  Modified independent   Transfers   Sit to Stand  --   --    Stand to sit  --   --    Bed to Chair  --   -- Ambulation   Ambulation?  --   --    WB Status  --   --    Ambulation 1   Surface  --   --    Device  --   --    Assistance  --   --    Quality of Gait  --   --    Distance  --   --    Comments  --   --    Wheelchair Activities   Wheelchair Parts Management  --   --    Left Leg Rest Level of Assistance  --   --    Right Leg Rest Level of Assistance  --   --    Left Brakes Level of Assistance  --   --    Right Brakes Level of Assistance  --   --    Propulsion  --   --    Propulsion 1   Propulsion  --   --    Level  --   --    Method  --   --    Level of Assistance  --   --    Description/ Details  --   --    Distance  --   --    Conditions Requiring Skilled Therapeutic Intervention   Assessment Increased amb distance this session. --    Activity Tolerance   Activity Tolerance  --   --    Safety Devices   Type of devices  --  Call light within reach; Bed alarm in place   Electronically signed by Isaac Koenig PTA on 1/25/2021 at 9:05 AM

## 2021-01-25 NOTE — PATIENT CARE CONFERENCE
[facility] ACUTE INPATIENT REHABILITATION  TEAM CONFERENCE NOTE    Date: 2021  Patient Name: Candido Nam        MRN: [id number]    : 1940  (79 y.o.)  Gender: male      Diagnosis: ACUTE RESP FAILURE VENTILATOR DEPENDENT      PHYSICAL THERAPY  STRENGTH  Strength RLE  Comment: Grossly 4-/5  Strength LLE  Comment: Grossly 4-/5  ROM  AROM RLE (degrees)  RLE AROM: WFL  AROM LLE (degrees)  LLE AROM : WFL  BED MOBILITY  Bed Mobility  Rolling: Modified independent  Supine to Sit: Modified independent  Sit to Supine: Modified independent  Scooting: Modified independent  TRANSFERS  Transfers  Sit to Stand: Stand by assistance, Independent  Stand to sit: Stand by assistance, Independent  Bed to Chair: Stand by assistance  Lateral Transfers: Contact guard assistance(W/C to Reciner)  WHEELCHAIR PROPULSION  Propulsion 1  Propulsion: Manual  Level: Level Tile  Method: CARLITO HERNÁNDEZ  Level of Assistance: Stand by assistance, Independent  Description/ Details: Incorporated turns. Distance: 100'  AMBULATION  Ambulation 1  Surface: level tile  Device: Rolling Walker  Other Apparatus: O2  Assistance: Stand by assistance  Quality of Gait: mild forward flexed posture, reciprocal gait pattern  Distance: 150' x2  Comments: Incorporated  turns. STAIRS     GOALS:  Short term goals  Time Frame for Short term goals: 1 TO 2 WEEKS  Short term goal 1: INDEP ON BED MOBILITY  Short term goal 2: SBA TRANSFERS  Short term goal 3: SBA  FEET W/OUT W/C FOLLOW  Short term goal 4: INDEP W/C PROPULSION 150 FEET  Short term goal 5: CGA 4 STEPS 2 RAILS    Long term goals  Time Frame for Long term goals : 2 TO 3 WEEKS  Long term goal 1: INDEP TRANSFERS  Long term goal 2: INDEP AMB WITHOUT  FEET  Long term goal 3: INDEP 4 STEPS  Long term goal 4: INDEP CAR TRANSFER  Long term goal 5: INDPE HEP    ASSESSMENT:  Assessment: Increased amb distance this session.       SPEECH THERAPY:  N/A      OCCUPATIONAL THERAPY CURRENT IRF-RHONDA SCORES  Eating: CARE Score: 4  Oral Hygiene: CARE Score: 5  Toileting: CARE Score: 4  Shower/Bathe: CARE Score: 4  Upper Body Dressing: CARE Score: 5  Lower Body Dressing: CARE Score: 4  Footwear: CARE Score: 4  Toilet Transfers: CARE Score: 4  Picking Up Object:  CARE Score: 88      UE Functioning:  WFLs    Pain Assessment:  Pain Level: 4  Pain Location: Back    STGs:  Short term goals  Time Frame for Short term goals: 1 week  Short term goal 1: complete overall toileting with supervision  Short term goal 2: complete LB dressing with supervision  Short term goal 3: complete overall bathing with supervision  Short term goal 4: complete ambulatory home making task with supervision  Short term goal 5: complete 1-2 handed standing level task for 3 mins with supervision  Short term goal 6: complete HEP x 5 occasions in order to improve strength and endurance for ADLs    LTGs:  Long term goals  Time Frame for Long term goals : 10-14 days  Long term goal 1: complete overall bathing with modified independence  Long term goal 2: complete overall toileting with modified independence  Long term goal 3: complete simple ambulatory home making task with modified independence  Long term goal 4: complete overall dressing with modified independence  Long term goal 5: complete HEP with independence  Long term goals 6: pt/family verbalize DME    Assessment:  Decreased endurance; Decreased functional mobility ; Decreased ADL status; Decreased balance; Decreased strength; Decreased high level IADLs  Current tx recommendations:  Self-Care / ADL; Safety Education & Training; Endurance Training; Strengthening; Functional Mobility Training; Balance Training; Equipment Evaluation, Education, & procurement; Patient/Caregiver Education & Training; Home Management Training            NUTRITION  Current Wt: Weight: 244 lb (110.7 kg) / Body mass index is 33.09 kg/m².   Admission Wt: Admission Body Weight: 244 lb 1.6 oz (110.7 kg) Oral Diet Orders: Diet General     Pt remains adequately nourished and low nutritional risk AEB stable wt since admission and po intake % of meals. Noted blood glucose levels low  since last assessment, recommend continuation of current diet. Will continue to monitor for need of CHO restricion. NURSING    Wounds/Incisions/Ulcers: No skin issues identified     Ángel Scale Score: 20    Pain: No pain concerns to address    Consultations/Labs/X-rays:     Family Education:  Scheduled for 1/29/21    Fall Risk:  Woodard Score: 61    Fall in the last week? none      Other Nursing Issues: Alert and oriented x4, cont of bowel and bladder, assist x1 with walker, meds whole with water, [place], IID RAC, ASA, Lovenox, Accu cheks QID, General diet        SOCIAL WORK/CASE MANAGEMENT  Assessment: Has family support, wife is eager for him to recover    Discharge Plan   Estimated Length of Stay: 1/31/21  Destination: discharge home with supervision    Pass: No    Services at Discharge: Other to be determined, as needed    Equipment at Discharge: To be determined    Progress made in the prior week:  1. Pt evaluated 1.22.21   2.  3.  4.  5.      Goals for following week:  1. Supervision for MRADLs  2. Functional activities on room air  3.   4.   5.     Factors facilitating achievement of predicted outcomes: Motivated, Cooperative and Pleasant    Barriers to the achievement of predicted outcomes: Decreased endurance, Upper extremity weakness and Lower extremity weakness    Team Members Present at Conference:  : Kolby Naidu   Occupational Therapist: Cindi Briseno OTR/L  Physical Therapist: Elvis Shell DPT  Speech Therapist: N/A  Nurse: Kris Strong RN,BSN   Nurse Manager:  Kris Strong RN, BSN  Dietitian:  Ree Dorsey, MS, RD, LD  Rehab Director:  [facility] approve the established interdisciplinary plan of care as documented within the medical record of Richiewoody Patrick.

## 2021-01-25 NOTE — PROGRESS NOTES
Nutrition Assessment     Type and Reason for Visit: Reassess    Nutrition Recommendations/Plan: Continue current POC. Nutrition Assessment:  Pt remains adequately nourished and low nutritional risk AEB stable wt since admission and po intake % of meals. Noted blood glucose levels low  since last assessment, recommend continuation of current diet. Will continue to monitor for need of CHO restricion. Malnutrition Assessment:  Malnutrition Status: No malnutrition    Current Nutrition Therapies:    DIET GENERAL;     Anthropometric Measures:  · Height: 6' (182.9 cm)  · Current Body Wt: 244 lb (110.7 kg)   · BMI: 33.1    Nutrition Interventions:   Food and/or Nutrient Delivery:  Continue Current Diet  Coordination of Nutrition Care:  Continue to monitor while inpatient    Goals:  Maintain PO >50%, wt stable       Nutrition Monitoring and Evaluation:   Food/Nutrient Intake Outcomes:  Food and Nutrient Intake  Physical Signs/Symptoms Outcomes:  Biochemical Data, Weight, Fluid Status or Edema, Nutrition Focused Physical Findings     Electronically signed by Lisa Durham MS, RD, LD on 1/25/21 at 1:47 PM CST    Contact: [phone]

## 2021-01-25 NOTE — PROGRESS NOTES
Occupational Therapy     01/25/21 1430   General   Additional Pertinent Hx COPD--no home O2, bladder CA   Diagnosis Hypoxemic respiratory failure, Sepsis, pneumonia   Pain Assessment   Patient Currently in Pain Yes   Pain Assessment 0-10   Pain Level 4   Pain Type Chronic pain   Pain Location Back   Pain Descriptors Aching   Pain Frequency Continuous   Clinical Progression Not changed   Response to Pain Intervention Patient Satisfied   Balance   Sitting Balance Independent   Standing Balance Supervision   Standing Balance   Time 3 minutes, 2 minutes and 25 seconds. Activity 1-2 handed static standing task   Comment Became SOB towards end of each stand   Functional Mobility   Functional - Mobility Device Rolling Walker   Activity To/From therapy gym;Retrieve items   Assist Level Stand by assistance   Functional Mobility Comments Retrieved items in the kitchen. Transfers   Sit to stand Supervision   Stand to sit Supervision   Assessment   Performance deficits / Impairments Decreased endurance;Decreased functional mobility ; Decreased ADL status; Decreased balance;Decreased strength   Treatment Diagnosis Hypoxemic respiratory failure, sepsis   Prognosis Good   Timed Code Treatment Minutes 45 Minutes   Activity Tolerance   Activity Tolerance Patient Tolerated treatment well   Activity Tolerance O2 at 97% at rest on room air, desat to 91% post standing task and functional mobility, exhibited SOB, increased to 94% with 3 minutes rest.   Safety Devices   Safety Devices in place Yes   Type of devices Call light within reach; Bed alarm in place   Plan   Current Treatment Recommendations Self-Care / ADL; Safety Education & Training; Endurance Training;Strengthening; Functional Mobility Training;Balance Training;Equipment Evaluation, Education, & procurement;Patient/Caregiver Education & Training;Home Management Training

## 2021-01-26 LAB
GLUCOSE BLD-MCNC: 125 MG/DL (ref 70–99)
GLUCOSE BLD-MCNC: 88 MG/DL (ref 70–99)
PERFORMED ON: ABNORMAL
PERFORMED ON: NORMAL

## 2021-01-26 PROCEDURE — 97530 THERAPEUTIC ACTIVITIES: CPT

## 2021-01-26 PROCEDURE — 97535 SELF CARE MNGMENT TRAINING: CPT

## 2021-01-26 PROCEDURE — 97116 GAIT TRAINING THERAPY: CPT

## 2021-01-26 PROCEDURE — 99233 SBSQ HOSP IP/OBS HIGH 50: CPT | Performed by: PSYCHIATRY & NEUROLOGY

## 2021-01-26 PROCEDURE — 82947 ASSAY GLUCOSE BLOOD QUANT: CPT

## 2021-01-26 PROCEDURE — 1180000000 HC REHAB R&B

## 2021-01-26 PROCEDURE — 2580000003 HC RX 258: Performed by: INTERNAL MEDICINE

## 2021-01-26 PROCEDURE — 6370000000 HC RX 637 (ALT 250 FOR IP): Performed by: PSYCHIATRY & NEUROLOGY

## 2021-01-26 PROCEDURE — 6360000002 HC RX W HCPCS: Performed by: PSYCHIATRY & NEUROLOGY

## 2021-01-26 PROCEDURE — 6370000000 HC RX 637 (ALT 250 FOR IP): Performed by: INTERNAL MEDICINE

## 2021-01-26 PROCEDURE — 97110 THERAPEUTIC EXERCISES: CPT

## 2021-01-26 RX ADMIN — SODIUM CHLORIDE, PRESERVATIVE FREE 10 ML: 5 INJECTION INTRAVENOUS at 07:50

## 2021-01-26 RX ADMIN — NIFEDIPINE 60 MG: 60 TABLET, FILM COATED, EXTENDED RELEASE ORAL at 07:44

## 2021-01-26 RX ADMIN — SODIUM CHLORIDE, PRESERVATIVE FREE 10 ML: 5 INJECTION INTRAVENOUS at 21:16

## 2021-01-26 RX ADMIN — ENOXAPARIN SODIUM 40 MG: 40 INJECTION SUBCUTANEOUS at 07:44

## 2021-01-26 RX ADMIN — METOPROLOL SUCCINATE 100 MG: 50 TABLET, EXTENDED RELEASE ORAL at 07:44

## 2021-01-26 RX ADMIN — TAMSULOSIN HYDROCHLORIDE 0.4 MG: 0.4 CAPSULE ORAL at 07:44

## 2021-01-26 RX ADMIN — ATORVASTATIN CALCIUM 20 MG: 20 TABLET, FILM COATED ORAL at 07:44

## 2021-01-26 RX ADMIN — PANTOPRAZOLE SODIUM 40 MG: 40 TABLET, DELAYED RELEASE ORAL at 07:44

## 2021-01-26 RX ADMIN — GLIPIZIDE 5 MG: 5 TABLET ORAL at 06:18

## 2021-01-26 RX ADMIN — ASPIRIN 81 MG: 81 TABLET, COATED ORAL at 07:44

## 2021-01-26 RX ADMIN — TROSPIUM CHLORIDE 20 MG: 20 TABLET, FILM COATED ORAL at 21:16

## 2021-01-26 NOTE — PROGRESS NOTES
Patient:   Yoana Sharpe  MR#:    [id number]   Room:    [facility]/825-   YOB: 1940  Date of Progress Note: 1/26/2021  Time of Note                           8:14 AM  Consulting Physician:   Gagandeep Bowens M.D. Attending Physician:  Gagandeep Bowens MD     Chief complaint Acute respiratory failure      S:This 79 y.o. male with a past medical history of bladder cancer, HTN, CKD stage III, PVD, hyperlipidemia,CAD,COPD not on home oxygen,ETOH use drinks 2 glasses of wine every night and former smoker. He presented to [facility] ER on 1/17/21 with lethargy and confusion. He was found to be Septic with acute respiratory failure with hypercapnia and bilateral pneumonia. He was intubated in ER and admitted to ICU under the hospitalist service. He was also found to have ANA and possible UTI. He was placed on IV  vancomycin and Zosyn. Patient was able to extubated on 1/19/21 and moved out of ICU on 1/20/21. He was still requiring oxygen at 3 l/m nc. He was evaluated by SPT for swallow and placed on a mechanical soft diet with nectar thick liquids. SPT also completed a cognitive eval. He was found to have  functional cognitive skills as evidenced by the Mini-Mental State Exam. Pt has minimal deficits with processing speed. Pt also has mild word-finding deficits in conversation that are resolved with a short response latency. Pt was observed with speech errors occasionally in spontaneous speech utterances. Pt did self-correct errors 1/2x. Sepsis and ANA have now resolved. He is participating in both PT/OT. He is felt to need a stay on Rehab to work towards his goal of returning home with his wife. He is now felt ready to start the Rehab program. No new issues.     REVIEW OF SYSTEMS:  Constitutional: No fevers No chills  Neck:No stiffness  Respiratory: No shortness of breath  Cardiovascular: No chest pain No palpitations  Gastrointestinal: No abdominal pain    Genitourinary: No Dysuria Neurological: No headache, no confusion    Past Medical History:      Diagnosis Date    Acute liver failure without hepatic coma 10/23/2018    Back pain     \"with tired legs as a result\"    Bladder cancer ([address] 12/19/2018    Blood circulation, collateral     Carotid arterial disease ([address]     recent surgery    CKD (chronic kidney disease), 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/2018    GERD (gastroesophageal reflux disease)     Hyperlipidemia     Hypertension     Hypertension     Palliative care patient 10/23/2018    Pneumonia due to infectious organism 11/06/2018    Primary osteoarthritis of left knee 10/14/2018    PVD (peripheral vascular disease) (HCC)     Tremor     Tremor on Right side x 1-2 weeks per stepdaughter    Type 2 diabetes mellitus with complication, without long-term current use of insulin ([address] 1/21/2021       Past Surgical History:      Procedure Laterality Date    BACK SURGERY      COLONOSCOPY  2007?  CYSTOSCOPY Bilateral 12/19/2018    CYSTOSCOPY, BIOSPY FULGURATION OF BLADDER TUMOR POSSIBLE TURBT, RETROGRADE PYELOGRAM performed by Kristal Garcia MD at [facility] COLONOSCOPY FLX DX W/COLLJ SPEC WHEN PFRMD N/A 9/11/2017    Dr Marlou Bumpers internal hemorrhoids, diverticular disease-HP-No recall (age)   [facility]/CARP[facility] SURG Left 7/18/2018    OPEN CARPAL TUNNEL RELEASE performed by Rex Murrell MD at [address] Left 8/28/2018    LEFT CAROTID ENDARTERECTOMY WITH VEIN PATCH ANGIOPLASTY AND COMPLETION ANGIOGRAM performed by Oracio Stein MD at [address] 10/15/2018    LEFT COMPLEX TOTAL KNEE ARTHROPLASTY performed by Rex Murrell MD at [facility] VASCULAR SURGERY  04/21/2015    Emmett BESS Ultrasound guided access of left common femoral artery. Aortogram.Diagnostic right lower extremity arteriogram.Radiologic supervision and interpretation.  VASCULAR SURGERY  01/13/2015    Kailee Wylie M.D Atherectomy,angioplasty,and stenting of left superficial femoral artery.  VASCULAR SURGERY  03/11/2014    Kailee Wylie M.D. Ultrasound-guided access of right common femoral artery. Aortogram.Left lower extremity arteriogram.Atherectomy and angioplasty of left superficial femoral artery. Radiologic supervision and interpretation.  VASCULAR SURGERY  01/18/2013    Kailee BESS Aortogram.Multistation arteriogram right lower extremity. Laser atherectomy and angioplasty of right superficial femoral artery. Selective catheterization of right tibioperoneal trunk. Angioplasty of peroneal artery and tibioperoneal trunk.  VASCULAR SURGERY  10/30/2018    [facility]JS. Ultrasound guided cannulation of right internal vein. Placement of right internal jugular vein tunneled dialysis catheter bard equistream xk 23cm tip to cuff    VASCULAR SURGERY  12/17/2018    SJS. Removal of tunneled dilaysis catheter right internal jugular vein.        Medications in Hospital:      Current Facility-Administered Medications:     insulin lispro (HUMALOG) injection vial 0-6 Units, 0-6 Units, Subcutaneous, TID WC, Jody Silvestre MD    insulin lispro (HUMALOG) injection vial 0-3 Units, 0-3 Units, Subcutaneous, Nightly, Jody Silvestre MD, 1 Units at 01/24/21 2045    glipiZIDE (GLUCOTROL) tablet 5 mg, 5 mg, Oral, QAM AC, Jody Silvestre MD, 5 mg at 01/26/21 0618    trospium (SANCTURA) tablet 20 mg, 20 mg, Oral, Nightly, Jody Silvestre MD, 20 mg at 01/25/21 2022    acetaminophen (TYLENOL) tablet 650 mg, 650 mg, Oral, Q6H PRN **OR** acetaminophen (TYLENOL) suppository 650 mg, 650 mg, Rectal, Q6H PRN, Missael Akins MD    magnesium sulfate 2000 mg in 50 mL IVPB premix, 2 g, Intravenous, PRN, Missael Akins MD    ondansetron [facility]) injection 4 mg, 4 mg, Intravenous, Q6H PRN, Missael Akins MD    polyethylene glycol [facility]) packet 17 g, 17 g, Oral, Daily PRN, Missael Akins MD   potassium chloride (KLOR-CON M) extended release tablet 40 mEq, 40 mEq, Oral, PRN **OR** potassium bicarb-citric acid (EFFER-K) effervescent tablet 40 mEq, 40 mEq, Oral, PRN **OR** potassium chloride 10 mEq/100 mL IVPB (Peripheral Line), 10 mEq, Intravenous, PRN, Isiah Sinha MD    sodium chloride flush 0.9 % injection 10 mL, 10 mL, Intravenous, 2 times per day, Isiah Sinha MD, 10 mL at 01/26/21 0750    sodium chloride flush 0.9 % injection 10 mL, 10 mL, Intravenous, PRN, Isiah Sinha MD    dextrose 5 % solution, 100 mL/hr, Intravenous, PRN, Isiah Sinha MD    dextrose 50 % IV solution, 12.5 g, Intravenous, PRN, Isiah Sinha MD    glucagon (rDNA) injection 1 mg, 1 mg, Intramuscular, PRN, Isiah Sinha MD    glucose (GLUTOSE) 40 % oral gel 15 g, 15 g, Oral, PRN, Isiah Sinha MD    aspirin EC tablet 81 mg, 81 mg, Oral, Daily, Isiah Sinha MD, 81 mg at 01/26/21 0744    atorvastatin (LIPITOR) tablet 20 mg, 20 mg, Oral, Daily, Isiah Sinha MD, 20 mg at 01/26/21 0744    dextrose 50 % IV solution, 25 g, Intravenous, PRN, Isiah Sinha MD    metoprolol succinate (TOPROL XL) extended release tablet 100 mg, 100 mg, Oral, Daily, Isiah Sinha MD, 100 mg at 01/26/21 0744    NIFEdipine (PROCARDIA XL) extended release tablet 60 mg, 60 mg, Oral, Daily, Isiah Sinha MD, 60 mg at 01/26/21 0744    pantoprazole (PROTONIX) tablet 40 mg, 40 mg, Oral, Daily, Isiah Sinha MD, 40 mg at 01/26/21 0744    tamsulosin (FLOMAX) capsule 0.4 mg, 0.4 mg, Oral, Daily, Isiah Sinha MD, 0.4 mg at 01/26/21 0744    acetaminophen (TYLENOL) tablet 650 mg, 650 mg, Oral, Q4H PRN, Talib Barbosa MD    enoxaparin (LOVENOX) injection 40 mg, 40 mg, Subcutaneous, Daily, Talib Barbosa MD, 40 mg at 01/26/21 0744    Allergies:  Eliquis [apixaban] and Promethazine hcl    Social History:   TOBACCO:   reports that he quit smoking about 17 years ago.  He has never used smokeless tobacco. ETOH:   reports current alcohol use of about 12.0 standard drinks of alcohol per week. Family History:       Problem Relation Age of Onset    Colon Cancer Father     Diabetes Brother     Colon Polyps Neg Hx     Liver Cancer Neg Hx     Liver Disease Neg Hx     Esophageal Cancer Neg Hx     Rectal Cancer Neg Hx     Stomach Cancer Neg Hx          PHYSICAL EXAM:  BP (!) 154/78   Pulse 73   Temp 97.7 °F (36.5 °C) (Temporal)   Resp 16   Ht 6' (1.829 m)   Wt 246 lb 7 oz (111.8 kg)   SpO2 91%   BMI 33.42 kg/m²     Constitutional  well developed, well nourished. Eyes  conjunctiva normal.   Ear, nose, throat - No scars, masses, or lesions over external nose or ears, no atrophy of tongue  Neck-symmetric, no masses noted, no jugular vein distension  Respiration- chest wall appears symmetric, good expansion,   normal effort without use of accessory muscles  Musculoskeletal  no significant wasting of muscles noted, no bony deformities  Extremities-no clubbing, cyanosis or edema  Skin  warm, dry, and intact. No rash, erythema, or pallor. Psychiatric  mood, affect, and behavior appear normal.      Neurological exam  Awake, alert, fluent oriented appropriate affect  Attention and concentration appear appropriate  Recent and remote memory appears unremarkable  Speech normal without dysarthria  No clear issues with language of fund of knowledge     Cranial Nerve Exam     CN III, IV,VI-EOMI, No nystagmus, conjugate eye movements, no ptosis    CN VII-no facial assymetry       Motor Exam  antigravity throughout upper and lower extremities bilaterally      Tremors- no tremors in hands or head noted     Gait  Not tested      Nursing/pcp notes, imaging,labs and vitals reviewed.      PT,OT and/or speech notes reviewed    Lab Results   Component Value Date    WBC 7.4 01/25/2021    HGB 13.2 (L) 01/25/2021    HCT 42.4 01/25/2021    MCV 92.0 01/25/2021     01/25/2021     Lab Results   Component Value Date  01/25/2021    K 4.0 01/25/2021     01/25/2021    CO2 27 01/25/2021    BUN 20 01/25/2021    CREATININE 1.3 (H) 01/25/2021    GLUCOSE 100 01/25/2021    CALCIUM 8.3 (L) 01/25/2021    PROT 5.4 (L) 01/25/2021    LABALBU 3.5 01/25/2021    BILITOT 0.5 01/25/2021    ALKPHOS 87 01/25/2021    AST 25 01/25/2021    ALT 26 01/25/2021    LABGLOM 53 (A) 01/25/2021    GFRAA >59 01/25/2021     Lab Results   Component Value Date    INR 1.16 01/21/2021    INR 1.08 09/01/2019    INR 1.02 06/10/2019    PROTIME 14.8 (H) 01/21/2021    PROTIME 13.4 09/01/2019    PROTIME 12.8 06/10/2019         Priyanka Solorio PTA   Physical Therapist Assistant   Specialty:  Physical Therapy   Progress Notes   Cosign Needed   Date of Service:  1/25/2021  9:05 AM               Cosign Needed             Show:Clear all  []Manual[x]Template[]Copied    Added by:  [x]Nilam Nobles PTA    []Dorian for details       01/25/21 0841 01/25/21 0842 01/25/21 0848   Pain Screening   Patient Currently in Pain No  --   --    Bed Mobility   Rolling  --  Modified independent  --    Supine to Sit  --  Modified independent  --    Sit to Supine  --   --   --    Scooting  --   --   --    Transfers   Sit to Stand  --  Stand by assistance; Independent  --    Stand to sit  --  Stand by assistance; Independent  --    Bed to Chair  --  Stand by assistance  --    Ambulation   Ambulation?  --  Yes  --    WB Status  --  WBAT BLE'S  --    Ambulation 1   Surface  --  level tile  --    Device  --  Rolling Walker  --    Assistance  --  Stand by assistance  --    Quality of Gait  --  mild forward flexed posture, reciprocal gait pattern  --    Distance  --  150' x2  --    Comments  --  Incorporated  turns. --    Wheelchair Activities   Wheelchair Parts Management  --  Yes  --    Left Leg Rest Level of Assistance  --  Stand by assistance  --    Right Leg Rest Level of Assistance  --  Stand by assistance  --    Left Brakes Level of Assistance  --  Stand by assistance; Independent  -- Right Brakes Level of Assistance  --  Stand by Assist;Independent  --    Propulsion  --  Yes  --    Propulsion 1   Propulsion  --  Manual  --    Level  --  Level Tile  --    Method  --  RUE;LUE  --    Level of Assistance  --  Stand by assistance; Independent  --    Description/ Details  --  Incorporated turns. --    Distance  --  100'  --    Conditions Requiring Skilled Therapeutic Intervention   Assessment  --   --   --    Activity Tolerance   Activity Tolerance  --   --  Patient Tolerated treatment well   Safety Devices   Type of devices  --   --   --         01/25/21 0849 01/25/21 0900   Pain Screening   Patient Currently in Pain  --   --    Bed Mobility   Rolling  --   --    Supine to Sit  --   --    Sit to Supine  --  Modified independent   Scooting  --  Modified independent   Transfers   Sit to Stand  --   --    Stand to sit  --   --    Bed to Chair  --   --    Ambulation   Ambulation?  --   --    WB Status  --   --    Ambulation 1   Surface  --   --    Device  --   --    Assistance  --   --    Quality of Gait  --   --    Distance  --   --    Comments  --   --    Wheelchair Activities   Wheelchair Parts Management  --   --    Left Leg Rest Level of Assistance  --   --    Right Leg Rest Level of Assistance  --   --    Left Brakes Level of Assistance  --   --    Right Brakes Level of Assistance  --   --    Propulsion  --   --    Propulsion 1   Propulsion  --   --    Level  --   --    Method  --   --    Level of Assistance  --   --    Description/ Details  --   --    Distance  --   --    Conditions Requiring Skilled Therapeutic Intervention   Assessment Increased amb distance this session. --    Activity Tolerance   Activity Tolerance  --   --    Safety Devices   Type of devices  --  Call light within reach; Bed alarm in place   Electronically signed by Dalton Martin PTA on 1/25/2021 at 9:05 AM                    RECORD REVIEW: Previous medical records, medications were reviewed at today's visit    IMPRESSION: 1. Acute respiratory failure/h/o COPD-off abx-monitor  2. CAD-ASA/statin  3. Hyperlipidemia-on statin  4. DVT prophylaxis-Lovenox  5. GI-PPI/bowel regimen  6. DM-oral hypoglycemics-monitor BS  7. HTN-on meds monitor  8. BPH/history of bladder cancer-Flomax  9. Back pain/osteoarthritis-tylenol PRN  10. Acute on chronic renal failure-monitor  11. History of ETOH use  12.  PT/OT/Speech    Continue present care    Team conference with PT/OT/speech/nursing/Care coordinator to review in depth patients care and discharge planning     Ambulation 200 ft RW RA  0 steps    ELOS January Saturday      Expected duration and frequency therapy: 180 minutes per day, 5 days per week    Pk Maldonado  [phone] CELL  Dr Maddison Ryan

## 2021-01-26 NOTE — PROGRESS NOTES
01/26/21 1345   Restrictions/Precautions   Restrictions/Precautions Weight Bearing; Fall Risk   Lower Extremity Weight Bearing Restrictions   Right Lower Extremity Weight Bearing Weight Bearing As Tolerated   Left Lower Extremity Weight Bearing Weight Bearing As Tolerated   General   Additional Pertinent Hx HTN, ACUTE KIDNEY FAILURE, PNEUMONIA, GERD, PVD, OA L KNEE, ACUTE LIVER FAILURE WITH COMA   Diagnosis ACUTE RESP FAILURE VENTILATOR DEPENDENT   Bed Mobility   Rolling Independent   Supine to Sit Independent   Sit to Supine Independent   Scooting Independent   Transfers   Sit to Stand Independent   Stand to sit Independent   Ambulation   Ambulation? Yes   WB Status WBAT BLE'S   More Ambulation? Yes   Ambulation 1   Surface level tile   Device Rolling Walker   Other Apparatus Wheelchair follow   Assistance Stand by assistance   Quality of Gait MILD FFP, HIP DROP ON R IN STATIC AND DYNAMIC POSTURE, DECREASED HIP EXT/DF/TOE OFF ON R. W/ FATIGUE PT TENDS TO KEEP RW TOO ANT TO BODY AND ALSO TENDS TO WB MORE ON OUTSIDE OF R FOOT. Distance 200FT, 230FT    Comments POST AMB BOUTS SPO2 AT 89%, BACK UP TO 93% WITHIN 2 MIN W/ SEATED REST BREAK POST AMB    Other exercises   Other exercises? Yes   Other exercises 1 tandem balance in II bars, 2x30 ea side  (NO UE support)   Other exercises 2 Eyes opened and eyes closed balance, 2x30   (NO UE SUPPORT)   Other exercises 3 side stepping in II bars, 2x 8 ft    Conditions Requiring Skilled Therapeutic Intervention   Assessment PT REQUIRED INCREASED REST BREAKS D/T DECONDITIONED STATUS BUT RECOVERS/IS ABLE TO PARTICIPATE IN THERAPEUTIC EX. PT DEMONSTRATED MORE BALANCE DIFFICULTY IN [place] WHEN RLE BEHIND LLE, AND MORE DIFFICULTY W/ SIDE STEPPING TO THE L. KEEP MONITORING SPO2 DURING SESSION, WORK ON AMB ENDURANCE, BALANCE, AND THER EX AT NEXT SESSION. DISCUSSED MAKING PT UP AD FANTA W/IN NEXT FEW DAYS W/ OT.     Activity Tolerance Activity Tolerance Patient limited by fatigue;Patient limited by endurance   Safety Devices   Type of devices   (PT LEFT W/ OT )

## 2021-01-26 NOTE — PLAN OF CARE
Problem: Gas Exchange - Impaired:  Goal: Levels of oxygenation will improve  Description: Levels of oxygenation will improve  Outcome: Ongoing     Problem: Mobility - Impaired:  Goal: Mobility will improve  Description: Mobility will improve  Outcome: Ongoing     Problem: SAFETY  Goal: Free from accidental physical injury  Outcome: Ongoing  Goal: Free from intentional harm  Outcome: Ongoing  Goal: LTG - Patient will demonstrate safety requirements appropriate to situation/environment  Outcome: Ongoing  Goal: LTG - patient will utilize safety techniques  Outcome: Ongoing  Goal: STG - patient locks brakes on wheelchair  Outcome: Ongoing  Goal: STG - Patient uses call light consistently to request assistance with transfers  Outcome: Ongoing  Goal: STG - patient uses gait belt during all transfers  Outcome: Ongoing     Problem: DAILY CARE  Goal: Daily care needs are met  Outcome: Ongoing     Problem: PAIN  Goal: Patient's pain/discomfort is manageable  Outcome: Ongoing  Goal: STG - patients pain is managed to allow active participation in daily activities  Outcome: Ongoing     Problem: SKIN INTEGRITY  Goal: Skin integrity is maintained or improved  Outcome: Ongoing  Goal: STG - patient will maintain good skin integrity  Outcome: Ongoing     Problem: KNOWLEDGE DEFICIT  Goal: Patient/S.O. demonstrates understanding of disease process, treatment plan, medications, and discharge instructions.   Outcome: Ongoing     Problem: DISCHARGE BARRIERS  Goal: Patient's continuum of care needs are met  Outcome: Ongoing     Problem: IP BLADDER/VOIDING  Goal: LTG - Patient will utilize adaptive techniques/equipment to complete bladder elimination  Outcome: Ongoing     Problem: IP BOWEL ELIMINATION  Goal: LTG - patient will have regular and routine bowel evacuation  Outcome: Ongoing     Problem: IP BREATHING  Goal: STG - respiratory rate and effort will be within normal limits for the patient  Outcome: Ongoing Goal: STG - patient/caregiver will be able to verbalize oxygen safety precautions  Outcome: Ongoing  Goal: STG - patient will utilize incentive spirometer  Outcome: Ongoing     Problem: NUTRITION  Goal: Patient maintains adequate hydration  Outcome: Ongoing  Goal: Patient/Family demonstrates understanding of diet  Outcome: Ongoing     Problem: Falls - Risk of:  Goal: Will remain free from falls  Description: Will remain free from falls  Outcome: Ongoing  Goal: Absence of physical injury  Description: Absence of physical injury  Outcome: Ongoing     Problem:  Activity:  Goal: Muscle strength will improve  Description: Muscle strength will improve  Outcome: Ongoing  Goal: Ability to maintain optimal joint mobility will improve  Description: Ability to maintain optimal joint mobility will improve  Outcome: Ongoing  Goal: Demonstration of safe techniques during patient transfers will improve  Description: Demonstration of safe techniques during patient transfers will improve  Outcome: Ongoing     Problem: Safety:  Goal: Ability to remain free from injury will improve  Description: Ability to remain free from injury will improve  Outcome: Ongoing     Problem: Pain:  Goal: Pain level will decrease  Description: Pain level will decrease  Outcome: Ongoing  Goal: Control of acute pain  Description: Control of acute pain  Outcome: Ongoing  Goal: Control of chronic pain  Description: Control of chronic pain  Outcome: Ongoing

## 2021-01-26 NOTE — PROGRESS NOTES
Occupational Therapy     01/26/21 0900   General   Diagnosis Hypoxemic respiratory failure, Sepsis, pneumonia   Pain Assessment   Patient Currently in Pain No   ADL   Grooming Independent   UE Bathing Supervision   LE Bathing Contact guard assistance   UE Dressing Supervision   LE Dressing Contact guard assistance   Additional Comments Pt. required CGA when standing. Balance   Sitting Balance Independent   Standing Balance Supervision   Functional Mobility   Functional - Mobility Device Rolling Walker   Activity To/from bathroom   Assist Level Stand by assistance   Bed mobility   Supine to Sit Modified independent   Sit to Supine Modified independent   Transfers   Sit to stand Supervision   Stand to sit Supervision   Assessment   Performance deficits / Impairments Decreased endurance;Decreased functional mobility ; Decreased ADL status; Decreased balance;Decreased strength   Treatment Diagnosis Hypoxemic respiratory failure, sepsis   Prognosis Good   Timed Code Treatment Minutes 60 Minutes   Activity Tolerance   Activity Tolerance Patient Tolerated treatment well   Safety Devices   Safety Devices in place Yes   Type of devices Call light within reach; Chair alarm in place   Plan   Current Treatment Recommendations Self-Care / ADL; Safety Education & Training; Endurance Training;Strengthening; Functional Mobility Training;Balance Training;Equipment Evaluation, Education, & procurement;Patient/Caregiver Education & Training;Home Management Training

## 2021-01-26 NOTE — PROGRESS NOTES
01/26/21 1100   Restrictions/Precautions   Restrictions/Precautions Weight Bearing; Fall Risk   Lower Extremity Weight Bearing Restrictions   Right Lower Extremity Weight Bearing Weight Bearing As Tolerated   Left Lower Extremity Weight Bearing Weight Bearing As Tolerated   General   Additional Pertinent Hx HTN, ACUTE KIDNEY FAILURE, PNEUMONIA, GERD, PVD, OA L KNEE, ACUTE LIVER FAILURE WITH COMA   Diagnosis ACUTE RESP FAILURE VENTILATOR DEPENDENT   General Comment   Comments pt at 89% SPO2 on room air after TF from bedside chair to w/c,  SPO2 post w/c propulsion to gym at 95% on room air     Transfers   Sit to Stand Supervision   Stand to sit Supervision   Bed to Chair Stand by assistance;Contact guard assistance  (stand step TF to the L )   Car Transfer Stand by assistance;Contact guard assistance  (PT USED STAND STEP TF TECHNIQUE IN/OUT OF CAR)   Comment SPO2 AT 91% AFTER CAR TF AND SEATED REST BREAK OF 5MIN    Ambulation   Ambulation? Yes   WB Status WBAT BLE'S   More Ambulation? Yes   Ambulation 1   Surface level tile   Device Rolling Walker   Other Apparatus Wheelchair follow   Assistance Stand by assistance;Contact guard assistance   Quality of Gait MILD FFP, HIP DROP ON R IN STATIC AND DYNAMIC POSTURE, DECREASED HIP EXT/DF/TOE OFF ON R. Distance 200 FT X 2 , 15FT X2   (TO/FROM BATHROOM END OF SESSION)   Comments POST AMB BOUTS SPO2 AT 89%, BACK UP TO 93% WITHIN 2 MIN W/ SEATED REST BREAK POST AMB    Stairs/Curb   Stairs?  Yes   Stairs   # Steps  8   Stairs Height 4\"   Rails Bilateral   Assistance Contact guard assistance   Comment RLE LEADING ASCENDING, LLE LEADING DESCENDING; POST STEPS PT SPO2 AT 89%, FOLLOWING 2 MIN REST SPO2 AT 94%    Wheelchair Activities   Wheelchair Parts Management Yes   Left Leg Rest Level of Assistance Stand by assistance   Right Leg Rest Level of Assistance Stand by assistance   Left Brakes Level of Assistance Supervision   Right Brakes Level of Assistance Supervision

## 2021-01-26 NOTE — PROGRESS NOTES
Occupational Therapy     01/26/21 1430   General   Diagnosis Hypoxemic respiratory failure, Sepsis, pneumonia   Pain Assessment   Patient Currently in Pain No   Balance   Sitting Balance Independent   Standing Balance Supervision   Standing Balance   Activity Light home making activity. Functional Mobility   Functional - Mobility Device Rolling Walker   Activity Transport items   Assist Level Stand by assistance   Functional Mobility Comments Transported item to closet in ADL apartment. Practiced transfering to/from recliner. Bed mobility   Supine to Sit Modified independent   Sit to Supine Modified independent   Transfers   Sit to stand Supervision   Stand to sit Supervision   Type of ROM/Therapeutic Exercise   Type of ROM/Therapeutic Exercise Free weights   Comment BUE 2# 2 sets of 15   Assessment   Performance deficits / Impairments Decreased endurance;Decreased functional mobility ; Decreased ADL status; Decreased balance;Decreased strength   Treatment Diagnosis Hypoxemic respiratory failure, sepsis   Prognosis Good   Timed Code Treatment Minutes 45 Minutes   Activity Tolerance   Activity Tolerance Patient Tolerated treatment well   Activity Tolerance O2 dropped to 92% after ambulation activity, returned to 97% after 1 minute of rest.   Safety Devices   Safety Devices in place Yes   Type of devices Call light within reach   Plan   Current Treatment Recommendations Self-Care / ADL; Safety Education & Training; Endurance Training;Strengthening; Functional Mobility Training;Balance Training;Equipment Evaluation, Education, & procurement;Patient/Caregiver Education & Training;Home Management Training

## 2021-01-26 NOTE — PLAN OF CARE
Problem: Gas Exchange - Impaired:  Goal: Levels of oxygenation will improve  Description: Levels of oxygenation will improve  1/25/2021 2329 by Jenna Wilder LPN  Outcome: Ongoing  1/25/2021 0959 by Royal Garcia RN  Outcome: Ongoing     Problem: Mobility - Impaired:  Goal: Mobility will improve  Description: Mobility will improve  1/25/2021 2329 by Jenna Wilder LPN  Outcome: Ongoing  1/25/2021 0959 by Royal Garcia RN  Outcome: Ongoing     Problem: SAFETY  Goal: Free from accidental physical injury  1/25/2021 2329 by Jenna Wilder LPN  Outcome: Ongoing  1/25/2021 0959 by Royal Garcia RN  Outcome: Ongoing  Goal: Free from intentional harm  1/25/2021 2329 by Jenna Wilder LPN  Outcome: Ongoing  1/25/2021 0959 by Royal Garcia RN  Outcome: Ongoing  Goal: LTG - Patient will demonstrate safety requirements appropriate to situation/environment  1/25/2021 2329 by Jenna Wilder LPN  Outcome: Ongoing  1/25/2021 0959 by Royal Garcia RN  Outcome: Ongoing  Goal: LTG - patient will utilize safety techniques  1/25/2021 2329 by Jenna Wilder LPN  Outcome: Ongoing  1/25/2021 0959 by Royal Garcia RN  Outcome: Ongoing  Goal: STG - patient locks brakes on wheelchair  1/25/2021 2329 by Jenna Wilder LPN  Outcome: Ongoing  1/25/2021 0959 by Royal Garcia RN  Outcome: Ongoing  Goal: STG - Patient uses call light consistently to request assistance with transfers  1/25/2021 2329 by Jenna Wilder LPN  Outcome: Ongoing  1/25/2021 0959 by Royal Garcia RN  Outcome: Ongoing  Goal: STG - patient uses gait belt during all transfers  1/25/2021 2329 by Jenna Wilder LPN  Outcome: Ongoing  1/25/2021 0959 by Royal Garcia RN  Outcome: Ongoing     Problem: DAILY CARE  Goal: Daily care needs are met  1/25/2021 2329 by Jenna Wilder LPN  Outcome: Ongoing  1/25/2021 0959 by Royal Garcia RN  Outcome: Ongoing     Problem: PAIN  Goal: Patient's pain/discomfort is manageable 1/25/2021 2329 by Yany Dunn LPN  Outcome: Ongoing  1/25/2021 0959 by Lear Dancer, RN  Outcome: Ongoing  Goal: STG - patients pain is managed to allow active participation in daily activities  1/25/2021 2329 by Yany Dunn LPN  Outcome: Ongoing  1/25/2021 0959 by Lear Dancer, RN  Outcome: Ongoing     Problem: SKIN INTEGRITY  Goal: Skin integrity is maintained or improved  1/25/2021 2329 by Yany Dunn LPN  Outcome: Ongoing  1/25/2021 0959 by Lear Dancer, RN  Outcome: Ongoing  Goal: STG - patient will maintain good skin integrity  1/25/2021 2329 by Yany Dunn LPN  Outcome: Ongoing  1/25/2021 0959 by Lear Dancer, RN  Outcome: Ongoing     Problem: KNOWLEDGE DEFICIT  Goal: Patient/S.O. demonstrates understanding of disease process, treatment plan, medications, and discharge instructions.   1/25/2021 2329 by Yany Dunn LPN  Outcome: Ongoing  1/25/2021 0959 by Lear Dancer, RN  Outcome: Ongoing     Problem: DISCHARGE BARRIERS  Goal: Patient's continuum of care needs are met  1/25/2021 2329 by Yany Dunn LPN  Outcome: Ongoing  1/25/2021 0959 by Lear Dancer, RN  Outcome: Ongoing     Problem: IP BLADDER/VOIDING  Goal: LTG - Patient will utilize adaptive techniques/equipment to complete bladder elimination  1/25/2021 2329 by Yany Dunn LPN  Outcome: Ongoing  1/25/2021 0959 by Lear Dancer, RN  Outcome: Ongoing     Problem: IP BOWEL ELIMINATION  Goal: LTG - patient will have regular and routine bowel evacuation  1/25/2021 2329 by Yany Dunn LPN  Outcome: Ongoing  1/25/2021 0959 by Lear Dancer, RN  Outcome: Ongoing     Problem: IP BREATHING  Goal: STG - respiratory rate and effort will be within normal limits for the patient  1/25/2021 2329 by Yany Dunn LPN  Outcome: Ongoing  1/25/2021 0959 by Lear Dancer, RN  Outcome: Ongoing  Goal: STG - patient/caregiver will be able to verbalize oxygen safety precautions  1/25/2021 2329 by Yany Dunn LPN Outcome: Ongoing  1/25/2021 0959 by El Mejia RN  Outcome: Ongoing  Goal: STG - patient will utilize incentive spirometer  1/25/2021 2329 by James Feliz LPN  Outcome: Ongoing  1/25/2021 0959 by El Mejia RN  Outcome: Ongoing     Problem: NUTRITION  Goal: Patient maintains adequate hydration  1/25/2021 2329 by James Feliz LPN  Outcome: Ongoing  1/25/2021 0959 by El Mejia RN  Outcome: Ongoing  Goal: Patient/Family demonstrates understanding of diet  1/25/2021 2329 by James Feliz LPN  Outcome: Ongoing  1/25/2021 0959 by El Mejia RN  Outcome: Ongoing     Problem: Falls - Risk of:  Goal: Will remain free from falls  Description: Will remain free from falls  1/25/2021 2329 by James Feliz LPN  Outcome: Ongoing  1/25/2021 0959 by El Mejia RN  Outcome: Ongoing  Goal: Absence of physical injury  Description: Absence of physical injury  1/25/2021 2329 by James Feliz LPN  Outcome: Ongoing  1/25/2021 0959 by El Mejia RN  Outcome: Ongoing     Problem:  Activity:  Goal: Muscle strength will improve  Description: Muscle strength will improve  1/25/2021 2329 by James Feliz LPN  Outcome: Ongoing  1/25/2021 0959 by El Mejia RN  Outcome: Ongoing  Goal: Ability to maintain optimal joint mobility will improve  Description: Ability to maintain optimal joint mobility will improve  1/25/2021 2329 by James Feliz LPN  Outcome: Ongoing  1/25/2021 0959 by El Mejia RN  Outcome: Ongoing  Goal: Demonstration of safe techniques during patient transfers will improve  Description: Demonstration of safe techniques during patient transfers will improve  1/25/2021 2329 by James Feliz LPN  Outcome: Ongoing  1/25/2021 0959 by El Mejia RN  Outcome: Ongoing     Problem: Safety:  Goal: Ability to remain free from injury will improve  Description: Ability to remain free from injury will improve  1/25/2021 2329 by James Feliz LPN  Outcome: Ongoing 1/25/2021 0959 by Cruz Chaves RN  Outcome: Ongoing     Problem: Pain:  Goal: Pain level will decrease  Description: Pain level will decrease  1/25/2021 2329 by Yohan Chaney LPN  Outcome: Ongoing  1/25/2021 0959 by Cruz Chaves RN  Outcome: Ongoing  Goal: Control of acute pain  Description: Control of acute pain  1/25/2021 2329 by Yohan Chaney LPN  Outcome: Ongoing  1/25/2021 0959 by Cruz Chaves RN  Outcome: Ongoing  Goal: Control of chronic pain  Description: Control of chronic pain  1/25/2021 2329 by Yohan Chaney LPN  Outcome: Ongoing  1/25/2021 0959 by Cruz Chaves RN  Outcome: Ongoing

## 2021-01-27 LAB
GLUCOSE BLD-MCNC: 94 MG/DL (ref 70–99)
PERFORMED ON: NORMAL

## 2021-01-27 PROCEDURE — 6370000000 HC RX 637 (ALT 250 FOR IP): Performed by: INTERNAL MEDICINE

## 2021-01-27 PROCEDURE — 97110 THERAPEUTIC EXERCISES: CPT

## 2021-01-27 PROCEDURE — 97116 GAIT TRAINING THERAPY: CPT

## 2021-01-27 PROCEDURE — 82947 ASSAY GLUCOSE BLOOD QUANT: CPT

## 2021-01-27 PROCEDURE — 1180000000 HC REHAB R&B

## 2021-01-27 PROCEDURE — 6360000002 HC RX W HCPCS: Performed by: PSYCHIATRY & NEUROLOGY

## 2021-01-27 PROCEDURE — 97530 THERAPEUTIC ACTIVITIES: CPT

## 2021-01-27 PROCEDURE — 2580000003 HC RX 258: Performed by: INTERNAL MEDICINE

## 2021-01-27 PROCEDURE — 99232 SBSQ HOSP IP/OBS MODERATE 35: CPT | Performed by: PSYCHIATRY & NEUROLOGY

## 2021-01-27 PROCEDURE — 6370000000 HC RX 637 (ALT 250 FOR IP): Performed by: PSYCHIATRY & NEUROLOGY

## 2021-01-27 RX ADMIN — GLIPIZIDE 5 MG: 5 TABLET ORAL at 06:15

## 2021-01-27 RX ADMIN — NIFEDIPINE 60 MG: 60 TABLET, FILM COATED, EXTENDED RELEASE ORAL at 08:05

## 2021-01-27 RX ADMIN — TROSPIUM CHLORIDE 20 MG: 20 TABLET, FILM COATED ORAL at 21:09

## 2021-01-27 RX ADMIN — ENOXAPARIN SODIUM 40 MG: 40 INJECTION SUBCUTANEOUS at 08:05

## 2021-01-27 RX ADMIN — PANTOPRAZOLE SODIUM 40 MG: 40 TABLET, DELAYED RELEASE ORAL at 08:05

## 2021-01-27 RX ADMIN — SODIUM CHLORIDE, PRESERVATIVE FREE 10 ML: 5 INJECTION INTRAVENOUS at 21:09

## 2021-01-27 RX ADMIN — TAMSULOSIN HYDROCHLORIDE 0.4 MG: 0.4 CAPSULE ORAL at 08:05

## 2021-01-27 RX ADMIN — SODIUM CHLORIDE, PRESERVATIVE FREE 10 ML: 5 INJECTION INTRAVENOUS at 08:12

## 2021-01-27 RX ADMIN — METOPROLOL SUCCINATE 100 MG: 50 TABLET, EXTENDED RELEASE ORAL at 08:05

## 2021-01-27 RX ADMIN — ATORVASTATIN CALCIUM 20 MG: 20 TABLET, FILM COATED ORAL at 08:05

## 2021-01-27 RX ADMIN — ASPIRIN 81 MG: 81 TABLET, COATED ORAL at 08:05

## 2021-01-27 NOTE — PROGRESS NOTES
01/27/21 1000   Bed Mobility   Rolling Independent   Supine to Sit Independent   Sit to Supine Independent   Transfers   Sit to Stand Independent   Stand to sit Independent   Bed to Chair Modified independent   Car Transfer Supervision  (ROLLATOR)   Ambulation 1   Device Rolling Walker   Assistance Stand by assistance   Quality of Gait FFP   Distance 180 FT   Ambulation 2   Device 2 Rollator   Assistance 2 Stand by assistance   Quality of Gait 2 HELD ROLLATOR SLIGHTLY MORE ANTERIOR TO BODY COMPARED TO RW. IMPROVED WITH VERBAL CUES   Distance 200 FT   Propulsion 1   Level of Assistance Modified independent   Distance 150 FT   Other exercises   Other exercises 1 SIDESTEPPING/BACKWARDS WALKING 25 FT X2   Conditions Requiring Skilled Therapeutic Intervention   Body structures, Functions, Activity limitations Decreased functional mobility ; Decreased strength;Decreased endurance;Decreased balance;Decreased coordination;Decreased safe awareness   Assessment PROGRESSED TO ROLLATOR. VCS REQUIRED TO KEEP A.D. CLOSE TO BODY WITH SUFFICIENT UPRIGHT POSTURE.

## 2021-01-27 NOTE — PROGRESS NOTES
Occupational Therapy  Facility/Department: [facility] REHAB UNIT  Daily Treatment Note  NAME: Luanne Goldmann  : 1940  MRN: [id number]    Date of Service: 2021    Discharge Recommendations:  Home with Home health OT       Assessment   Performance deficits / Impairments: Decreased endurance;Decreased functional mobility ; Decreased ADL status; Decreased balance;Decreased strength  Treatment Diagnosis: Hypoxemic respiratory failure, sepsis  Prognosis: Good  OT Education: Transfer Training  Activity Tolerance  Activity Tolerance: Patient Tolerated treatment well  Safety Devices  Safety Devices in place: Yes  Type of devices: Call light within reach; Bed alarm in place         Patient Diagnosis(es): There were no encounter diagnoses. has a past medical history of Acute liver failure without hepatic coma, Back pain, Bladder cancer ([address] Blood circulation, collateral, Carotid arterial disease ([address] CKD (chronic kidney disease), stage II, GERD (gastroesophageal reflux disease), Hyperlipidemia, Hypertension, Hypertension, Palliative care patient, Pneumonia due to infectious organism, Primary osteoarthritis of left knee, PVD (peripheral vascular disease) ([address] Tremor, and Type 2 diabetes mellitus with complication, without long-term current use of insulin ([address] has a past surgical history that includes back surgery; Tonsillectomy and adenoidectomy; Colonoscopy (?); pr colonoscopy flx dx w/collj spec when pfrmd (N/A, 2017); pr revise median n/carpal tunnel surg (Left, 2018); pr thromboendartectmy neck,neck incis (Left, 2018); vascular surgery (2015); vascular surgery (2015); vascular surgery (2014); vascular surgery (2013); pr total knee arthroplasty (Left, 10/15/2018); vascular surgery (10/30/2018); vascular surgery (2018); and Cystoscopy (Bilateral, 2018).     Restrictions  Restrictions/Precautions  Restrictions/Precautions: Weight Bearing, Fall Risk Lower Extremity Weight Bearing Restrictions  Right Lower Extremity Weight Bearing: Weight Bearing As Tolerated  Left Lower Extremity Weight Bearing: Weight Bearing As Tolerated  Subjective   General  Chart Reviewed: Yes, Orders  Patient assessed for rehabilitation services?: Yes  Additional Pertinent Hx: COPD--no home O2, bladder CA  Family / Caregiver Present: No  Diagnosis: Hypoxemic respiratory failure, Sepsis, pneumonia  Vital Signs  Pulse: 76  Oxygen Therapy  SpO2: 93 %(Post functional mobility)  Pulse Oximeter Device Mode: Intermittent  Pulse Oximeter Device Location: Left;Finger  O2 Device: None (Room air)        Objective       Instrumental ADL's  Instrumental ADLs: Yes  Meal Prep  Meal Prep Level: Walker(Rollator)  Meal Prep Level of Assistance: Stand by assistance  Meal Preparation: Picking up clothes with reacher and hanging up in closet with 2 sitting rest breaks     Balance  Sitting Balance: Independent  Standing Balance: Supervision  Standing Balance  Time: 5 minutes  Activity: 2 handed task  Functional Mobility  Functional - Mobility Device: 4-Wheeled Walker  Activity: To/from bathroom; Other; To/From therapy gym;Retrieve items;Transport items  Assist Level: Stand by assistance     Transfers  Sit to stand: Supervision  Stand to sit: Supervision  Transfer Comments: to/from rocking recliner           Type of ROM/Therapeutic Exercise  Type of ROM/Therapeutic Exercise: Free weights  Comment: BUE 2#, 15 reps in all planes      Plan   Plan  Current Treatment Recommendations: Self-Care / ADL, Safety Education & Training, Endurance Training, Strengthening, Functional Mobility Training, Balance Training, Equipment Evaluation, Education, & procurement, Patient/Caregiver Education & Training, Home Management Training         Goals  Short term goals  Time Frame for Short term goals: 1 week  Short term goal 1: complete overall toileting with supervision  Short term goal 2: complete LB dressing with supervision Short term goal 3: complete overall bathing with supervision  Short term goal 4: complete ambulatory home making task with supervision  Short term goal 5: MET  Short term goal 6: complete HEP x 5 occasions in order to improve strength and endurance for ADLs  Long term goals  Time Frame for Long term goals : 10-14 days  Long term goal 1: complete overall bathing with modified independence  Long term goal 2: complete overall toileting with modified independence  Long term goal 3: complete simple ambulatory home making task with modified independence  Long term goal 4: complete overall dressing with modified independence  Long term goal 5: complete HEP with independence  Long term goals 6: pt/family verbalize DME  Patient Goals   Patient goals : go home       Therapy Time   Individual Concurrent Group Co-treatment   Time In 1100         Time Out 1200         Minutes 60         Timed Code Treatment Minutes: 75 Christiano Acevedo, OT

## 2021-01-27 NOTE — PROGRESS NOTES
01/27/21 1313 01/27/21 1320 01/27/21 1334   Pain Screening   Patient Currently in Pain No  --   --    Transfers   Sit to Stand  --  Independent  --    Stand to sit  --  Independent  --    Bed to Chair  --  Independent  --    Ambulation   Ambulation?  --  Yes  --    WB Status  --  WBAT BLE'S  --    Ambulation 1   Surface  --  level tile  --    Device  --  Rollator  --    Assistance  --  Stand by assistance;Modified Independent  --    Quality of Gait  --  Forward flexed posture. --    Distance  --  250' x2  --    Comments  --  Incorporated turns. --    Wheelchair Activities   Wheelchair Parts Management  --  Yes  --    Left Leg Rest Level of Assistance  --  Stand by assistance  --    Right Leg Rest Level of Assistance  --  Stand by assistance  --    Left Brakes Level of Assistance  --  Stand by assistance; Independent  --    Right Brakes Level of Assistance  --  Stand by Assist;Independent  --    Conditions Requiring Skilled Therapeutic Intervention   Assessment  --   --  Jameson session well. Doing well using Rollator.    Activity Tolerance   Activity Tolerance  --   --  Patient Tolerated treatment well   Safety Devices   Type of devices  --   --   --       01/27/21 1345   Pain Screening   Patient Currently in Pain  --    Transfers   Sit to Stand  --    Stand to sit  --    Bed to Chair  --    Ambulation   Ambulation?  --    WB Status  --    Ambulation 1   Surface  --    Device  --    Assistance  --    Quality of Gait  --    Distance  --    Comments  --    Wheelchair Activities   Wheelchair Parts Management  --    Left Leg Rest Level of Assistance  --    Right Leg Rest Level of Assistance  --    Left Brakes Level of Assistance  --    Right Brakes Level of Assistance  --    Conditions Requiring Skilled Therapeutic Intervention   Assessment  --    Activity Tolerance   Activity Tolerance  --    Safety Devices   Type of devices Call light within reach   Electronically signed by Priyanka Solorio PTA on 1/27/2021 at 3:27 PM

## 2021-01-27 NOTE — PROGRESS NOTES
Occupational Therapy     01/27/21 1430   General   Diagnosis Hypoxemic respiratory failure, Sepsis, pneumonia   Pain Assessment   Patient Currently in Pain No   Balance   Sitting Balance Independent   Standing Balance Supervision   Standing Balance   Time 4 minutes, 1 minute. Activity 2 handed static standing task. Comment No stabilization needed. Functional Mobility   Functional - Mobility Device 4-Wheeled Walker   Activity To/From therapy gym   Assist Level Stand by assistance   Bed mobility   Supine to Sit Modified independent   Sit to Supine Modified independent   Transfers   Sit to stand Supervision   Stand to sit Supervision   Type of ROM/Therapeutic Exercise   Type of ROM/Therapeutic Exercise Mary Alice   Comment TAYE 15#. 3 sets of 15 reps. Assessment   Performance deficits / Impairments Decreased endurance;Decreased functional mobility ; Decreased ADL status; Decreased balance;Decreased strength   Treatment Diagnosis Hypoxemic respiratory failure, sepsis   Prognosis Good   Timed Code Treatment Minutes 45 Minutes   Activity Tolerance   Activity Tolerance Patient Tolerated treatment well   Safety Devices   Safety Devices in place Yes   Type of devices Call light within reach; Bed alarm in place   Plan   Current Treatment Recommendations Self-Care / ADL; Safety Education & Training; Endurance Training;Strengthening; Functional Mobility Training;Balance Training;Equipment Evaluation, Education, & procurement;Patient/Caregiver Education & Training;Home Management Training

## 2021-01-27 NOTE — PLAN OF CARE
Problem: Gas Exchange - Impaired:  Goal: Levels of oxygenation will improve  Description: Levels of oxygenation will improve  1/26/2021 2249 by Tania Andrew LPN  Outcome: Ongoing  1/26/2021 1716 by Enid Soria RN  Outcome: Ongoing     Problem: Mobility - Impaired:  Goal: Mobility will improve  Description: Mobility will improve  1/26/2021 2249 by Tania Andrew LPN  Outcome: Ongoing  1/26/2021 1716 by Enid Soria RN  Outcome: Ongoing     Problem: SAFETY  Goal: Free from accidental physical injury  1/26/2021 2249 by Tania Andrew LPN  Outcome: Ongoing  1/26/2021 1716 by Enid Soria RN  Outcome: Ongoing  Goal: Free from intentional harm  1/26/2021 2249 by Tania Andrew LPN  Outcome: Ongoing  1/26/2021 1716 by Enid Soria RN  Outcome: Ongoing  Goal: LTG - Patient will demonstrate safety requirements appropriate to situation/environment  1/26/2021 2249 by Tania Andrew LPN  Outcome: Ongoing  1/26/2021 1716 by Enid Soria RN  Outcome: Ongoing  Goal: LTG - patient will utilize safety techniques  1/26/2021 2249 by Tania Andrew LPN  Outcome: Ongoing  1/26/2021 1716 by Enid Soria RN  Outcome: Ongoing  Goal: STG - patient locks brakes on wheelchair  1/26/2021 2249 by Tania Andrew LPN  Outcome: Ongoing  1/26/2021 1716 by Enid Soria RN  Outcome: Ongoing  Goal: STG - Patient uses call light consistently to request assistance with transfers  1/26/2021 2249 by Tania Andrew LPN  Outcome: Ongoing  1/26/2021 1716 by Enid Soria RN  Outcome: Ongoing  Goal: STG - patient uses gait belt during all transfers  1/26/2021 2249 by Tania Andrew LPN  Outcome: Ongoing  1/26/2021 1716 by Enid Soria RN  Outcome: Ongoing     Problem: DAILY CARE  Goal: Daily care needs are met  1/26/2021 2249 by Tania Andrew LPN  Outcome: Ongoing  1/26/2021 1716 by Enid Quirk, RN  Outcome: Ongoing     Problem: PAIN  Goal: Patient's pain/discomfort is manageable 1/26/2021 2249 by Johnny Parra LPN  Outcome: Ongoing  1/26/2021 1716 by Yuniel Akbar RN  Outcome: Ongoing  Goal: STG - patients pain is managed to allow active participation in daily activities  1/26/2021 2249 by Johnny Parra LPN  Outcome: Ongoing  1/26/2021 1716 by Yuniel Akbar RN  Outcome: Ongoing     Problem: SKIN INTEGRITY  Goal: Skin integrity is maintained or improved  1/26/2021 2249 by Johnny Parra LPN  Outcome: Ongoing  1/26/2021 1716 by Yuniel Akbar RN  Outcome: Ongoing  Goal: STG - patient will maintain good skin integrity  1/26/2021 2249 by Johnny Parra LPN  Outcome: Ongoing  1/26/2021 1716 by Yuniel Akbar RN  Outcome: Ongoing     Problem: KNOWLEDGE DEFICIT  Goal: Patient/S.O. demonstrates understanding of disease process, treatment plan, medications, and discharge instructions.   1/26/2021 2249 by Johnny Parra LPN  Outcome: Ongoing  1/26/2021 1716 by Yuniel Akbar RN  Outcome: Ongoing     Problem: DISCHARGE BARRIERS  Goal: Patient's continuum of care needs are met  1/26/2021 2249 by Johnny Parra LPN  Outcome: Ongoing  1/26/2021 1716 by Yuniel Akbar RN  Outcome: Ongoing     Problem: IP BLADDER/VOIDING  Goal: LTG - Patient will utilize adaptive techniques/equipment to complete bladder elimination  1/26/2021 2249 by Johnny Parra LPN  Outcome: Ongoing  1/26/2021 1716 by Yuniel Akbar RN  Outcome: Ongoing     Problem: IP BOWEL ELIMINATION  Goal: LTG - patient will have regular and routine bowel evacuation  1/26/2021 2249 by Johnny Parra LPN  Outcome: Ongoing  1/26/2021 1716 by Yuniel Akbar RN  Outcome: Ongoing     Problem: IP BREATHING  Goal: STG - respiratory rate and effort will be within normal limits for the patient  1/26/2021 2249 by Johnny Parra LPN  Outcome: Ongoing  1/26/2021 1716 by Yuniel Akbar RN  Outcome: Ongoing  Goal: STG - patient/caregiver will be able to verbalize oxygen safety precautions 1/26/2021 2249 by Vanessa Braga LPN  Outcome: Ongoing  1/26/2021 1716 by Lavinia Cox RN  Outcome: Ongoing  Goal: STG - patient will utilize incentive spirometer  1/26/2021 2249 by Vanessa Braga LPN  Outcome: Ongoing  1/26/2021 1716 by Lavinia Cox RN  Outcome: Ongoing     Problem: NUTRITION  Goal: Patient maintains adequate hydration  1/26/2021 2249 by Vanessa Braga LPN  Outcome: Ongoing  1/26/2021 1716 by Lavinia Cox RN  Outcome: Ongoing  Goal: Patient/Family demonstrates understanding of diet  1/26/2021 2249 by Vanessa Braga LPN  Outcome: Ongoing  1/26/2021 1716 by Lavinia Cox RN  Outcome: Ongoing     Problem: Falls - Risk of:  Goal: Will remain free from falls  Description: Will remain free from falls  1/26/2021 2249 by Vanessa Braga LPN  Outcome: Ongoing  1/26/2021 1716 by Lavinia Cox RN  Outcome: Ongoing  Goal: Absence of physical injury  Description: Absence of physical injury  1/26/2021 2249 by Vanessa Braga LPN  Outcome: Ongoing  1/26/2021 1716 by Lavinia Cox RN  Outcome: Ongoing     Problem:  Activity:  Goal: Muscle strength will improve  Description: Muscle strength will improve  1/26/2021 2249 by Vanessa Braga LPN  Outcome: Ongoing  1/26/2021 1716 by Lavinia Cox RN  Outcome: Ongoing  Goal: Ability to maintain optimal joint mobility will improve  Description: Ability to maintain optimal joint mobility will improve  1/26/2021 2249 by Vanessa Braga LPN  Outcome: Ongoing  1/26/2021 1716 by Lavinia Cox RN  Outcome: Ongoing  Goal: Demonstration of safe techniques during patient transfers will improve  Description: Demonstration of safe techniques during patient transfers will improve  1/26/2021 2249 by Vanessa Braga LPN  Outcome: Ongoing  1/26/2021 1716 by Lavinia Cox RN  Outcome: Ongoing     Problem: Safety:  Goal: Ability to remain free from injury will improve  Description: Ability to remain free from injury will improve 1/26/2021 2249 by Vanessa Braga LPN  Outcome: Ongoing  1/26/2021 1716 by Lavinia Cox RN  Outcome: Ongoing     Problem: Pain:  Goal: Pain level will decrease  Description: Pain level will decrease  1/26/2021 2249 by Vanessa Braga LPN  Outcome: Ongoing  1/26/2021 1716 by Lavinia Cox RN  Outcome: Ongoing  Goal: Control of acute pain  Description: Control of acute pain  1/26/2021 2249 by Vanessa Braga LPN  Outcome: Ongoing  1/26/2021 1716 by Lavinia Cox RN  Outcome: Ongoing  Goal: Control of chronic pain  Description: Control of chronic pain  1/26/2021 2249 by Vanessa Braga LPN  Outcome: Ongoing  1/26/2021 1716 by Lavinia Cox RN  Outcome: Ongoing

## 2021-01-27 NOTE — PROGRESS NOTES
 VASCULAR SURGERY  01/13/2015    Genaro Stanley M.D Atherectomy,angioplasty,and stenting of left superficial femoral artery.  VASCULAR SURGERY  03/11/2014    Genaro Stanley M.D. Ultrasound-guided access of right common femoral artery. Aortogram.Left lower extremity arteriogram.Atherectomy and angioplasty of left superficial femoral artery. Radiologic supervision and interpretation.  VASCULAR SURGERY  01/18/2013    Genaro BESS Aortogram.Multistation arteriogram right lower extremity. Laser atherectomy and angioplasty of right superficial femoral artery. Selective catheterization of right tibioperoneal trunk. Angioplasty of peroneal artery and tibioperoneal trunk.  VASCULAR SURGERY  10/30/2018    [facility]. Ultrasound guided cannulation of right internal vein. Placement of right internal jugular vein tunneled dialysis catheter bard equistream xk 23cm tip to cuff    VASCULAR SURGERY  12/17/2018    [facility]. Removal of tunneled dilaysis catheter right internal jugular vein.        Medications in Hospital:      Current Facility-Administered Medications:     glipiZIDE (GLUCOTROL) tablet 5 mg, 5 mg, Oral, QAM AC, Talib Barbosa MD, 5 mg at 01/27/21 0615    trospium (SANCTURA) tablet 20 mg, 20 mg, Oral, Nightly, Talib Barbosa MD, 20 mg at 01/26/21 2116    acetaminophen (TYLENOL) tablet 650 mg, 650 mg, Oral, Q6H PRN **OR** acetaminophen (TYLENOL) suppository 650 mg, 650 mg, Rectal, Q6H PRN, Isiah Sinha MD    magnesium sulfate 2000 mg in 50 mL IVPB premix, 2 g, Intravenous, PRN, Isiah Sinha MD    ondansetron [facility]) injection 4 mg, 4 mg, Intravenous, Q6H PRN, Isiah Sinha MD    polyethylene glycol [facility]) packet 17 g, 17 g, Oral, Daily PRN, Isiah Sinha MD   potassium chloride (KLOR-CON M) extended release tablet 40 mEq, 40 mEq, Oral, PRN **OR** potassium bicarb-citric acid (EFFER-K) effervescent tablet 40 mEq, 40 mEq, Oral, PRN **OR** potassium chloride 10 mEq/100 mL IVPB (Peripheral Line), 10 mEq, Intravenous, PRN, Rose Herron MD    sodium chloride flush 0.9 % injection 10 mL, 10 mL, Intravenous, 2 times per day, Rose Herron MD, 10 mL at 01/27/21 9279    sodium chloride flush 0.9 % injection 10 mL, 10 mL, Intravenous, PRN, Rose Herron MD    dextrose 5 % solution, 100 mL/hr, Intravenous, PRN, Rose Herron MD    dextrose 50 % IV solution, 12.5 g, Intravenous, PRN, Rose Herron MD    glucagon (rDNA) injection 1 mg, 1 mg, Intramuscular, PRN, Rose Herron MD    glucose (GLUTOSE) 40 % oral gel 15 g, 15 g, Oral, PRN, Rose Herron MD    aspirin EC tablet 81 mg, 81 mg, Oral, Daily, Rose Herron MD, 81 mg at 01/27/21 0805    atorvastatin (LIPITOR) tablet 20 mg, 20 mg, Oral, Daily, Rose Herron MD, 20 mg at 01/27/21 0805    dextrose 50 % IV solution, 25 g, Intravenous, PRN, Rose Herron MD    metoprolol succinate (TOPROL XL) extended release tablet 100 mg, 100 mg, Oral, Daily, Rose Herron MD, 100 mg at 01/27/21 0805    NIFEdipine (PROCARDIA XL) extended release tablet 60 mg, 60 mg, Oral, Daily, Rose Herron MD, 60 mg at 01/27/21 0805    pantoprazole (PROTONIX) tablet 40 mg, 40 mg, Oral, Daily, Rose Herron MD, 40 mg at 01/27/21 0805    tamsulosin (FLOMAX) capsule 0.4 mg, 0.4 mg, Oral, Daily, Rose Herron MD, 0.4 mg at 01/27/21 0805    acetaminophen (TYLENOL) tablet 650 mg, 650 mg, Oral, Q4H PRN, Jeff Bauer MD    enoxaparin (LOVENOX) injection 40 mg, 40 mg, Subcutaneous, Daily, Jeff Bauer MD, 40 mg at 01/27/21 0805    Allergies:  Eliquis [apixaban] and Promethazine hcl    Social History:   TOBACCO:   reports that he quit smoking about 17 years ago.  He has never used smokeless tobacco. ETOH:   reports current alcohol use of about 12.0 standard drinks of alcohol per week. Family History:       Problem Relation Age of Onset    Colon Cancer Father     Diabetes Brother     Colon Polyps Neg Hx     Liver Cancer Neg Hx     Liver Disease Neg Hx     Esophageal Cancer Neg Hx     Rectal Cancer Neg Hx     Stomach Cancer Neg Hx          PHYSICAL EXAM:  BP (!) 150/70   Pulse 70   Temp 97.2 °F (36.2 °C) (Temporal)   Resp 18   Ht 6' (1.829 m)   Wt 244 lb 9.6 oz (110.9 kg)   SpO2 93%   BMI 33.17 kg/m²     Constitutional  well developed, well nourished. Eyes  conjunctiva normal.   Ear, nose, throat - No scars, masses, or lesions over external nose or ears, no atrophy of tongue  Neck-symmetric, no masses noted, no jugular vein distension  Respiration- chest wall appears symmetric, good expansion,   normal effort without use of accessory muscles  Musculoskeletal  no significant wasting of muscles noted, no bony deformities  Extremities-no clubbing, cyanosis or edema  Skin  warm, dry, and intact. No rash, erythema, or pallor. Psychiatric  mood, affect, and behavior appear normal.      Neurological exam  Awake, alert, fluent oriented appropriate affect  Attention and concentration appear appropriate  Recent and remote memory appears unremarkable  Speech normal without dysarthria  No clear issues with language of fund of knowledge     Cranial Nerve Exam     CN III, IV,VI-EOMI, No nystagmus, conjugate eye movements, no ptosis    CN VII-no facial assymetry       Motor Exam  antigravity throughout upper and lower extremities bilaterally      Tremors- no tremors in hands or head noted     Gait  Not tested      Nursing/pcp notes, imaging,labs and vitals reviewed.      PT,OT and/or speech notes reviewed    Lab Results   Component Value Date    WBC 7.4 01/25/2021    HGB 13.2 (L) 01/25/2021    HCT 42.4 01/25/2021    MCV 92.0 01/25/2021     01/25/2021     Lab Results   Component Value Date  01/25/2021    K 4.0 01/25/2021     01/25/2021    CO2 27 01/25/2021    BUN 20 01/25/2021    CREATININE 1.3 (H) 01/25/2021    GLUCOSE 100 01/25/2021    CALCIUM 8.3 (L) 01/25/2021    PROT 5.4 (L) 01/25/2021    LABALBU 3.5 01/25/2021    BILITOT 0.5 01/25/2021    ALKPHOS 87 01/25/2021    AST 25 01/25/2021    ALT 26 01/25/2021    LABGLOM 53 (A) 01/25/2021    GFRAA >59 01/25/2021     Lab Results   Component Value Date    INR 1.16 01/21/2021    INR 1.08 09/01/2019    INR 1.02 06/10/2019    PROTIME 14.8 (H) 01/21/2021    PROTIME 13.4 09/01/2019    PROTIME 12.8 06/10/2019         Ally Phelan   Student Physical Therapist   Physical Therapy   Progress Notes   Signed   Date of Service:  1/26/2021  2:30 PM               Signed             Show:Clear all  [x]Manual[x]Template[]Copied    Added by:  Fang Be    []Dorian for details       01/26/21 1345   Restrictions/Precautions   Restrictions/Precautions Weight Bearing; Fall Risk   Lower Extremity Weight Bearing Restrictions   Right Lower Extremity Weight Bearing Weight Bearing As Tolerated   Left Lower Extremity Weight Bearing Weight Bearing As Tolerated   General   Additional Pertinent Hx HTN, ACUTE KIDNEY FAILURE, PNEUMONIA, GERD, PVD, OA L KNEE, ACUTE LIVER FAILURE WITH COMA   Diagnosis ACUTE RESP FAILURE VENTILATOR DEPENDENT   Bed Mobility   Rolling Independent   Supine to Sit Independent   Sit to Supine Independent   Scooting Independent   Transfers   Sit to Stand Independent   Stand to sit Independent   Ambulation   Ambulation? Yes   WB Status WBAT BLE'S   More Ambulation? Yes   Ambulation 1   Surface level tile   Device Rolling Walker   Other Apparatus Wheelchair follow   Assistance Stand by assistance   Quality of Gait MILD FFP, HIP DROP ON R IN STATIC AND DYNAMIC POSTURE, DECREASED HIP EXT/DF/TOE OFF ON R. W/ FATIGUE PT TENDS TO KEEP RW TOO ANT TO BODY AND ALSO TENDS TO WB MORE ON OUTSIDE OF R FOOT.    Distance 200FT, 230FT Comments POST AMB BOUTS SPO2 AT 89%, BACK UP TO 93% WITHIN 2 MIN W/ SEATED REST BREAK POST AMB    Other exercises   Other exercises? Yes   Other exercises 1 tandem balance in II bars, 2x30 ea side  (NO UE support)   Other exercises 2 Eyes opened and eyes closed balance, 2x30   (NO UE SUPPORT)   Other exercises 3 side stepping in II bars, 2x 8 ft    Conditions Requiring Skilled Therapeutic Intervention   Assessment PT REQUIRED INCREASED REST BREAKS D/T DECONDITIONED STATUS BUT RECOVERS/IS ABLE TO PARTICIPATE IN THERAPEUTIC EX. PT DEMONSTRATED MORE BALANCE DIFFICULTY IN [place] WHEN RLE BEHIND LLE, AND MORE DIFFICULTY W/ SIDE STEPPING TO THE L. KEEP MONITORING SPO2 DURING SESSION, WORK ON AMB ENDURANCE, BALANCE, AND THER EX AT NEXT SESSION. DISCUSSED MAKING PT UP AD FANTA W/IN NEXT FEW DAYS W/ OT. Activity Tolerance   Activity Tolerance Patient limited by fatigue;Patient limited by endurance   Safety Devices   Type of devices    (PT LEFT W/ OT )                Cosigned by: Alfred Camarena PT at 1/27/2021  8:22 AM   Revision History                                    RECORD REVIEW: Previous medical records, medications were reviewed at today's visit    IMPRESSION:   1. Acute respiratory failure/h/o COPD-off abx-monitor  2. CAD-ASA/statin  3. Hyperlipidemia-on statin  4. DVT prophylaxis-Lovenox  5. GI-PPI/bowel regimen  6. DM-oral hypoglycemics-monitor BS  7. HTN-on meds monitor  8. BPH/history of bladder cancer-Flomax  9. Back pain/osteoarthritis-tylenol PRN  10. Acute on chronic renal failure-monitor  11. History of ETOH use  12.  PT/OT/Speech      Continue care    [facility] January Saturday      Expected duration and frequency therapy: 180 minutes per day, 5 days per week    [address] [facility]  [phone] CELL  Dr Angus Vale

## 2021-01-28 LAB
ALBUMIN SERPL-MCNC: 3.4 G/DL (ref 3.5–5.2)
ALP BLD-CCNC: 74 U/L (ref 40–130)
ALT SERPL-CCNC: 20 U/L (ref 5–41)
ANION GAP SERPL CALCULATED.3IONS-SCNC: 7 MMOL/L (ref 7–19)
AST SERPL-CCNC: 18 U/L (ref 5–40)
BASOPHILS ABSOLUTE: 0 K/UL (ref 0–0.2)
BASOPHILS RELATIVE PERCENT: 0.9 % (ref 0–1)
BILIRUB SERPL-MCNC: 0.5 MG/DL (ref 0.2–1.2)
BUN BLDV-MCNC: 22 MG/DL (ref 8–23)
CALCIUM SERPL-MCNC: 8.5 MG/DL (ref 8.8–10.2)
CHLORIDE BLD-SCNC: 101 MMOL/L (ref 98–111)
CO2: 31 MMOL/L (ref 22–29)
CREAT SERPL-MCNC: 1.4 MG/DL (ref 0.5–1.2)
EOSINOPHILS ABSOLUTE: 0 K/UL (ref 0–0.6)
EOSINOPHILS RELATIVE PERCENT: 0.7 % (ref 0–5)
GFR AFRICAN AMERICAN: >59
GFR NON-AFRICAN AMERICAN: 49
GLUCOSE BLD-MCNC: 92 MG/DL (ref 74–109)
HCT VFR BLD CALC: 39.6 % (ref 42–52)
HEMOGLOBIN: 12.3 G/DL (ref 14–18)
IMMATURE GRANULOCYTES #: 0 K/UL
LYMPHOCYTES ABSOLUTE: 1 K/UL (ref 1.1–4.5)
LYMPHOCYTES RELATIVE PERCENT: 21.4 % (ref 20–40)
MCH RBC QN AUTO: 28.3 PG (ref 27–31)
MCHC RBC AUTO-ENTMCNC: 31.1 G/DL (ref 33–37)
MCV RBC AUTO: 91 FL (ref 80–94)
MONOCYTES ABSOLUTE: 0.5 K/UL (ref 0–0.9)
MONOCYTES RELATIVE PERCENT: 11.2 % (ref 0–10)
NEUTROPHILS ABSOLUTE: 3 K/UL (ref 1.5–7.5)
NEUTROPHILS RELATIVE PERCENT: 65.4 % (ref 50–65)
PDW BLD-RTO: 13.3 % (ref 11.5–14.5)
PLATELET # BLD: 158 K/UL (ref 130–400)
PMV BLD AUTO: 11.6 FL (ref 9.4–12.4)
POTASSIUM REFLEX MAGNESIUM: 3.9 MMOL/L (ref 3.5–5)
RBC # BLD: 4.35 M/UL (ref 4.7–6.1)
SODIUM BLD-SCNC: 139 MMOL/L (ref 136–145)
TOTAL PROTEIN: 5.5 G/DL (ref 6.6–8.7)
WBC # BLD: 4.6 K/UL (ref 4.8–10.8)

## 2021-01-28 PROCEDURE — 85025 COMPLETE CBC W/AUTO DIFF WBC: CPT

## 2021-01-28 PROCEDURE — 97535 SELF CARE MNGMENT TRAINING: CPT

## 2021-01-28 PROCEDURE — 97116 GAIT TRAINING THERAPY: CPT

## 2021-01-28 PROCEDURE — 6370000000 HC RX 637 (ALT 250 FOR IP): Performed by: INTERNAL MEDICINE

## 2021-01-28 PROCEDURE — 97530 THERAPEUTIC ACTIVITIES: CPT

## 2021-01-28 PROCEDURE — 97110 THERAPEUTIC EXERCISES: CPT

## 2021-01-28 PROCEDURE — 36415 COLL VENOUS BLD VENIPUNCTURE: CPT

## 2021-01-28 PROCEDURE — 99232 SBSQ HOSP IP/OBS MODERATE 35: CPT | Performed by: PSYCHIATRY & NEUROLOGY

## 2021-01-28 PROCEDURE — 1180000000 HC REHAB R&B

## 2021-01-28 PROCEDURE — 6360000002 HC RX W HCPCS: Performed by: PSYCHIATRY & NEUROLOGY

## 2021-01-28 PROCEDURE — 6370000000 HC RX 637 (ALT 250 FOR IP): Performed by: PSYCHIATRY & NEUROLOGY

## 2021-01-28 PROCEDURE — 80053 COMPREHEN METABOLIC PANEL: CPT

## 2021-01-28 RX ADMIN — ENOXAPARIN SODIUM 40 MG: 40 INJECTION SUBCUTANEOUS at 08:48

## 2021-01-28 RX ADMIN — GLIPIZIDE 5 MG: 5 TABLET ORAL at 05:56

## 2021-01-28 RX ADMIN — PANTOPRAZOLE SODIUM 40 MG: 40 TABLET, DELAYED RELEASE ORAL at 08:48

## 2021-01-28 RX ADMIN — ATORVASTATIN CALCIUM 20 MG: 20 TABLET, FILM COATED ORAL at 08:47

## 2021-01-28 RX ADMIN — TAMSULOSIN HYDROCHLORIDE 0.4 MG: 0.4 CAPSULE ORAL at 08:47

## 2021-01-28 RX ADMIN — ASPIRIN 81 MG: 81 TABLET, COATED ORAL at 08:47

## 2021-01-28 RX ADMIN — METOPROLOL SUCCINATE 100 MG: 50 TABLET, EXTENDED RELEASE ORAL at 08:47

## 2021-01-28 RX ADMIN — TROSPIUM CHLORIDE 20 MG: 20 TABLET, FILM COATED ORAL at 20:43

## 2021-01-28 RX ADMIN — NIFEDIPINE 60 MG: 60 TABLET, FILM COATED, EXTENDED RELEASE ORAL at 08:47

## 2021-01-28 NOTE — PROGRESS NOTES
Durable Medical Equipment   Physician Order     Patient Name Luanne Goldmann  Patient Phone: [phone] [facility])   [phone] (Mobile) *Preferred*    Patient Address: [address] [place] [address] [address] [address]    Patient Height Height: 6' (182.9 cm)  Patient Weight 241 lb 12.8 oz (109.7 kg)   1940     1. MEDICARE/RAILROAD MEDICARE    F/O Payor/Plan Precert #   MEDICARE/RAILROAD MEDICARE    Subscriber Subscriber #   Kamala Moisés [id number]   Address Phone   [address]   [place] [facility]    2.  [employer]    F/O Payor/Plan Precert #   [employer]    Subscriber Subscriber #   Kamala Moisés [id number]   Address Phone   [address] [place] [employer] [facility], [address] [place] [address] [phone]       DME needed:    Rolling walker with seat        DIAGNOSIS:  Acute respiratory failure ([address] J96.00     Bilateral carotid artery stenosis I65.23     [facility]   History and Physical           Patient:                                    Luanne Goldmann  MR#:                                        [id number]  Account Number:                   [id number]              Room:                                      [address] [place] [address]      YOB: 1940  Date of Progress Note:           2021  Time of Note                           8:05 AM  Attending Physician:               Jeff Bauer MD           Admitting diagnosis:Acute respiratory failure with hypercapnia     Secondary diagnoses:Essential (primary) hypertension,Heart failure, unspecified,Acute cystitis with hematuria,Acute kidney failure, unspecified,Chronic kidney disease, stage 3 unspecified,Pneumonia, unspecified organism,Gastro-esophageal reflux disease without esophagitis,Hyperlipidemia, unspecified,Peripheral vascular disease, unspecified,Enlarged prostate with lower urinary tract symptoms     CHIEF COMPLAINT:  Acute respiratory failure          HISTORY OF PRESENT ILLNESS: This 79 y.o. male  with a past medical history of bladder cancer, HTN, CKD stage III, PVD, hyperlipidemia,CAD,COPD not on home oxygen,ETOH use drinks 2 glasses of wine every night and former smoker. He presented to [facility] ER on 1/17/21 with lethargy and confusion. He was found to be Septic with acute respiratory failure with hypercapnia and bilateral pneumonia. He was intubated in ER and admitted to ICU under the hospitalist service. He was also found to have ANA and possible UTI. He was placed on IV  vancomycin and Zosyn, which he has now been on for 4 days with all cultures currently negative to date. Patient was able to extubated on 1/19/21 and moved out of ICU on 1/20/21. He is still requiring oxygen at 3 l/m nc. He was evaluated by SPT for swallow and placed on a mechanical soft diet with nectar thick liquids. SPT also completed a cognitive eval. He was found to have  functional cognitive skills as evidenced by the Mini-Mental State Exam. Pt has minimal deficits with processing speed. Pt also has mild word-finding deficits in conversation that are resolved with a short response latency. Pt was observed with speech errors occasionally in spontaneous speech utterances. Pt did self-correct errors 1/2x. Sepsis and ANA have now resolved. He is participating in both PT/OT. He is felt to need a stay on Rehab to work towards his goal of returning home with his wife. He is now felt ready to start the Rehab program.     REVIEW OF SYSTEMS:  Constitutional  No fever or chills. No diaphoresis or significant fatigue. HENT   No tinnitus or significant hearing loss. Eyes  no sudden vision change or eye pain  Respiratory  some shortness of breath no cough  Cardiovascular  no chest pain No palpitations or significant leg swelling  Gastrointestinal  no abdominal swelling or pain. Genitourinary  No difficulty urinating, dysuria  Musculoskeletal  + back pain no myalgia.   Skin  no color change or rash Neurologic  No seizures. No lateralizing weakness. Hematologic  no easy bruising or excessive bleeding. Psychiatric  no severe anxiety or nervousness. All other review of systems are negative.        Past Medical History:  Past Medical History             Diagnosis Date    Acute liver failure without hepatic coma 10/23/2018    Back pain       \"with tired legs as a result\"    Bladder cancer ([facility] 12/19/2018    Blood circulation, collateral      Carotid arterial disease (HCC)       recent surgery    CKD (chronic kidney disease), 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/2018    GERD (gastroesophageal reflux disease)      Hyperlipidemia      Hypertension      Hypertension      Palliative care patient 10/23/2018    Pneumonia due to infectious organism 11/06/2018    Primary osteoarthritis of left knee 10/14/2018    PVD (peripheral vascular disease) (HCC)      Tremor       Tremor on Right side x 1-2 weeks per stepdaughter    Type 2 diabetes mellitus with complication, without long-term current use of insulin ([facility] [address] 1/21/2021            Past Surgical History:  Past Surgical History             Procedure Laterality Date    BACK SURGERY        COLONOSCOPY   2007?     CYSTOSCOPY Bilateral 12/19/2018     CYSTOSCOPY, BIOSPY FULGURATION OF BLADDER TUMOR POSSIBLE TURBT, RETROGRADE PYELOGRAM performed by Marci Swift MD at [address] COLONOSCOPY FLX DX W/COLLJ SPEC WHEN PFRMD N/A 9/11/2017     Dr Inder Rich internal hemorrhoids, diverticular disease-HP-No recall (age)   Ivory Stewart [place] REVISE MEDIAN N/CARPAL TUNNEL SURG Left 7/18/2018     OPEN CARPAL TUNNEL RELEASE performed by Judi Vital MD at [address] Left 8/28/2018     LEFT CAROTID ENDARTERECTOMY WITH VEIN PATCH ANGIOPLASTY AND COMPLETION ANGIOGRAM performed by Harley Barrera MD at [facility] Left 10/15/2018     LEFT COMPLEX TOTAL KNEE ARTHROPLASTY performed by Judi Vital MD at [address] [place] [address]  TONSILLECTOMY AND ADENOIDECTOMY        VASCULAR SURGERY   04/21/2015     Eliana BESS Ultrasound guided access of left common femoral artery. Aortogram.Diagnostic right lower extremity arteriogram.Radiologic supervision and interpretation.  VASCULAR SURGERY   01/13/2015     Eliana Schultz M.D Atherectomy,angioplasty,and stenting of left superficial femoral artery.  VASCULAR SURGERY   03/11/2014   Alpesh Chamberlain M.D. Ultrasound-guided access of right common femoral artery. Aortogram.Left lower extremity arteriogram.Atherectomy and angioplasty of left superficial femoral artery. Radiologic supervision and interpretation.  VASCULAR SURGERY   01/18/2013     Eliana BESS Aortogram.Multistation arteriogram right lower extremity. Laser atherectomy and angioplasty of right superficial femoral artery. Selective catheterization of right tibioperoneal trunk. Angioplasty of peroneal artery and tibioperoneal trunk.  VASCULAR SURGERY   10/30/2018     [facility]. Ultrasound guided cannulation of right internal vein. Placement of right internal jugular vein tunneled dialysis catheter bard equistream xk 23cm tip to cuff    VASCULAR SURGERY   12/17/2018     S[facility]. Removal of tunneled dilaysis catheter right internal jugular vein.            Medications in Hospital:      Current Medication      Current Facility-Administered Medications:     acetaminophen (TYLENOL) tablet 650 mg, 650 mg, Oral, Q6H PRN **OR** acetaminophen (TYLENOL) suppository 650 mg, 650 mg, Rectal, Q6H PRN, Sam Carcamo MD    famotidine (PEPCID) injection 20 mg, 20 mg, Intravenous, Daily, Sam Carcamo MD    magnesium sulfate 2000 mg in 50 mL IVPB premix, 2 g, Intravenous, PRN, Sam Carcamo MD    ondansetron [facility]) injection 4 mg, 4 mg, Intravenous, Q6H PRN, Sam Carcamo MD    polyethylene glycol [facility]) packet 17 g, 17 g, Oral, Daily PRN, Sam Carcamo MD   potassium chloride (KLOR-CON M) extended release tablet 40 mEq, 40 mEq, Oral, PRN **OR** potassium bicarb-citric acid (EFFER-K) effervescent tablet 40 mEq, 40 mEq, Oral, PRN **OR** potassium chloride 10 mEq/100 mL IVPB (Peripheral Line), 10 mEq, Intravenous, PRN, Rose Herron MD    sodium chloride flush 0.9 % injection 10 mL, 10 mL, Intravenous, 2 times per day, Rose Herron MD, 10 mL at 01/21/21 2207    sodium chloride flush 0.9 % injection 10 mL, 10 mL, Intravenous, PRN, Rose Herron MD    dextrose 5 % solution, 100 mL/hr, Intravenous, PRN, Rose Herron MD    dextrose 50 % IV solution, 12.5 g, Intravenous, PRN, Rose Herron MD    glucagon (rDNA) injection 1 mg, 1 mg, Intramuscular, PRN, Rose Herron MD    glucose (GLUTOSE) 40 % oral gel 15 g, 15 g, Oral, PRN, Rose Herron MD    aspirin EC tablet 81 mg, 81 mg, Oral, Daily, Rose Herron MD, 81 mg at 01/21/21 1704    atorvastatin (LIPITOR) tablet 20 mg, 20 mg, Oral, Daily, Rose Herron MD, 20 mg at 01/21/21 1704    dextrose 50 % IV solution, 25 g, Intravenous, PRN, Rose Herron MD    glipiZIDE (GLUCOTROL) tablet 10 mg, 10 mg, Oral, QAM AC, Rose Herron MD, 10 mg at 01/22/21 0548    insulin lispro (HUMALOG) injection vial 0-12 Units, 0-12 Units, Subcutaneous, TID WC, Rose Herron MD    insulin lispro (HUMALOG) injection vial 0-6 Units, 0-6 Units, Subcutaneous, Nightly, Rose Herron MD    metoprolol succinate (TOPROL XL) extended release tablet 100 mg, 100 mg, Oral, Daily, Rose Herron MD    NIFEdipine (PROCARDIA XL) extended release tablet 60 mg, 60 mg, Oral, Daily, Rose Herron MD, 60 mg at 01/21/21 1704    pantoprazole (PROTONIX) tablet 40 mg, 40 mg, Oral, Daily, Rose Herron MD, 40 mg at 01/21/21 1704    tamsulosin (FLOMAX) capsule 0.4 mg, 0.4 mg, Oral, Daily, Rose Herron MD, 0.4 mg at 01/21/21 1704    acetaminophen (TYLENOL) tablet 650 mg, 650 mg, Oral, Q4H PRN, Jeff Bauer MD   enoxaparin (LOVENOX) injection 40 mg, 40 mg, Subcutaneous, Daily, Rashad Beltran MD        Allergies:  Eliquis [apixaban] and Promethazine hcl     Social History:   TOBACCO:   reports that he quit smoking about 17 years ago. He has never used smokeless tobacco.  ETOH:   reports current alcohol use of about 12.0 standard drinks of alcohol per week.     Family History:   Family History             Problem Relation Age of Onset    Colon Cancer Father      Diabetes Brother      Colon Polyps Neg Hx      Liver Cancer Neg Hx      Liver Disease Neg Hx      Esophageal Cancer Neg Hx      Rectal Cancer Neg Hx      Stomach Cancer Neg Hx                    Physical Exam:    Vitals: BP (!) 156/80   Pulse 89   Temp 97.6 °F (36.4 °C) (Temporal)   Resp 16   Ht 6' (1.829 m)   Wt 244 lb 1.6 oz (110.7 kg)   SpO2 90%   BMI 33.11 kg/m²      Constitutional  well developed, well nourished. Eyes  conjunctiva normal.  Pupils not tested  Ear, nose, throat -hearing intact to finger rub No scars, masses, or lesions over external nose or ears, no atrophy of tongue  Neck-symmetric, no masses noted, no jugular vein distension  Respiration- chest wall appears symmetric, good expansion,   normal effort without use of accessory muscles  Musculoskeletal  no significant wasting of muscles noted, no bony deformities  Extremities-no clubbing, cyanosis or edema  Skin  warm, dry, and intact. No rash, erythema, or pallor.   Psychiatric  mood, affect, and behavior appear normal.       Neurological exam  Awake, alert, fluent oriented x 3 appropriate affect  Attention and concentration appear appropriate  Recent and remote memory appears unremarkable  Speech normal without dysarthria  No clear issues with language of fund of knowledge     Cranial Nerve Exam   CN II- Visual fields grossly unremarkable  CN III, IV,VI-EOMI, No nystagmus, conjugate eye movements, no ptosis  CN V-sensation intact to LT over face CN VII-no facial assymetry  CN VIII-Hearing intact to finger rub  CN IX and X- Palate not tested  CN XI-not test shoulder shrug  CN XII-Tongue midline with no fasciculations or fibrillations     Motor Exam  Antigravity throughout upper and lower extremities bilaterally, no cogwheeling, normal tone     Sensory Exam  Sensation reduced below knees     Reflexes   Not tested     Tremors- no tremors in hands or head noted     Gait  Not tested     Coordination  Finger to nose-unremarkable           CBC:         Recent Labs     01/20/21  0129 01/21/21 0455 01/22/21 0459   WBC 8.7 6.8 7.4   HGB 12.9* 12.5* 12.5*    169 150         BMP:          Recent Labs     01/20/21 0129 01/21/21 0455 01/22/21 0459    144 142   K 3.9 4.0 3.6    105 104   CO2 27 28 27   BUN 44* 29* 21   CREATININE 2.0* 1.8* 1.6*   GLUCOSE 89 84 97         Hepatic:       Recent Labs     01/22/21 0459   AST 25   ALT 24   BILITOT 0.7   ALKPHOS 91         Lipids: No results for input(s): CHOL, HDL in the last 72 hours.     Invalid input(s): LDLCALCU     INR:       Recent Labs     01/21/21  1514   INR 1.16               Assessment and Plan      1. Acute respiratory failure/h/o COPD-off abx-monitor  2. CAD-ASA/statin  3. Hyperlipidemia-on statin  4. DVT prophylaxis-Lovenox  5. GI-PPI/bowel regimen  6. DM-oral hypoglycemics-monitor BS  7. HTN-on meds monitor  8. BPH/history of bladder cancer-Flomax  9. Back pain/osteoarthritis-tylenol PRN  10. Acute on chronic renal failure-monitor  11. History of ETOH use  12.  PT/OT/Speech              Patient's functional assessment  Prior to hospitalization the patient was continent of bowel and incontinent of bladder     Current therapy  Requires PT, OT and/or speech therapy     Anticipated discharge approximately 22 days     Functional assessment  All notes from reehab data were reviewed regarding the patient's functional status.           Anticipated discharge setting  Home with home care    No clear barriers to discharge     The patient appears to be an appropriate candidate for inpatient rehabilitation     Sufficiently stable: Patient's condition is sufficiently stable at the time of admission to allow the patient to actively participate in an intensive rehabilitation program     Close medical supervision: A rehabilitation physician, or other licensed treating physician with specialized training and experience in inpatient rehabilitation, will conduct face-to-face visits with the patient a minimum of at least 3 days per week throughout the patient's stay     This patient requires close medical supervision for the active management of the ongoing conditions and potential complications stated in the admission note     Intensive rehabilitation nursing: The patient demonstrates the need for 24-hour rehabilitation nursing care for active management of the multiple medical issues documented in the admission note     Appropriate therapy needed: The patient requires the active and ongoing therapeutic intervention of at least 2 therapeutic disciplines, one of which must be physical or occupational therapy and/or speech therapy     Intensive therapy: The patient requires and is reasonably expected to actively participate in at least 3 hours of therapy per day at least 5 days per week, and expected to make measurable improvements that will be of practical value to improve the patient's functional capacity or adaptation to impairments.  In addition, therapy treatments will begin within 36 hours from midnight of the day of the patient's admission to the inpatient rehabilitation facility     Expected duration and frequency therapy: 180 minutes per day, 5 days per week    Interdisciplinary team: The patient demonstrates the need for an interdisciplinary team for active management of the following medical issues including ataxia, motor planning, balance, disease management, elimination, endurance, family training, education, independent ADLs, pain management, precautions, range of motion, safety, strength, and transfers     I have reviewed the preadmission screening documents and concur with the findings. I believe the patient meets criteria and is sufficiently stable to allow participation in the program. This requires an intensive level of therapy, close medical supervision, and an interdisciplinary team approach provided through an individualized plan of care. I approve admitting this patient for an intensive inpatient rehabilitation program.     Please feel free to call with any questions   [phone] (cell phone)     Dr Surya June certified in Neurology  Board Certified in Clinical Neurophysiology  Fellowship Trained in [address] [place] [address] Dragon/transcription disclaimer:Significant part of this  encounter note is electronic transcription/translation of spoken language to printed text. The electronic translation of spoken language may be erroneous, or at times, nonsensical words or phrases may be inadvertently transcribed.  Although I have reviewed the note for such errors, some may still exist.             Routing History                ____________________ _____________________ ________________   Physician Signature      Physician Name (print)   Physician NPI          Date

## 2021-01-28 NOTE — CARE COORDINATION
[facility]  [facility]  Test for Patient [place] in the Areas of Transfers/Ambulation    Ambulation/Transfers   · Independent ambulation in room with assistive device:  Yes     -Device Type:  Rollator  · Independent transfers from wheelchair to surface in room:  Yes     Bathroom Transfers  · Independent transfers in patient bathroom:  Yes         Inpatient Rehabilitation Nursing and Therapies feel as though the patient qualifies for an acute rehabilitation test for independence in the areas of transfers and ambulation prior to discharging from Inpatient Rehabilitation Unit at [facility].  This test for independence in the areas of transfers and ambulation must be agreed upon by the patient's physician, nurse, and therapists.         Nurse Approval:  Electronically signed by Ana Laura Yu RN on 1/28/2021 at 12:37 PM    Physical Therapist Approval:  Electronically signed by Igor Jensen PTA on 1/28/2021 at 10:21 AM      Occupational Therapist Approval: Electronically signed by YSABEL Vincent on 1/28/2021 at 8:40 AM

## 2021-01-28 NOTE — PROGRESS NOTES
Occupational Therapy     01/28/21 0815   General   Diagnosis Hypoxemic respiratory failure, Sepsis, pneumonia   Pain Assessment   Patient Currently in Pain No   ADL   Grooming Modified independent    UE Bathing Modified independent    LE Bathing Modified independent    UE Dressing Modified independent    LE Dressing Modified independent    Toileting Modified independent    Balance   Sitting Balance Independent   Standing Balance Modified independent    Functional Mobility   Functional - Mobility Device 4-Wheeled Walker   Activity To/from bathroom   Assist Level Modified independent    Functional Mobility Comments Shower. Bed mobility   Supine to Sit Modified independent   Sit to Supine Modified independent   Transfers   Sit to stand Modified independent   Stand to sit Modified independent   Short term goals   Short term goal 1 MET   Short term goal 2 MET   Short term goal 3 MET   Long term goals   Long term goal 1 MET   Long term goal 2 MET   Long term goal 4 MET   Assessment   Performance deficits / Impairments Decreased endurance;Decreased strength;Decreased functional mobility ; Decreased high-level IADLs   Treatment Diagnosis Hypoxemic respiratory failure, sepsis   Prognosis Good   Timed Code Treatment Minutes 45 Minutes   Activity Tolerance   Activity Tolerance Patient Tolerated treatment well   Safety Devices   Type of devices Call light within reach   Plan   Current Treatment Recommendations Endurance Training;Functional Mobility Training;Equipment Evaluation, Education, & procurement;Strengthening;Home Management Training

## 2021-01-28 NOTE — PROGRESS NOTES
Occupational Therapy     01/28/21 1430   General   Diagnosis Hypoxemic respiratory failure, Sepsis, pneumonia   Pain Assessment   Patient Currently in Pain No   Balance   Sitting Balance Independent   Standing Balance Modified independent    Standing Balance   Time 5 minutes, 3 minutes and 30 seconds. Activity 2 handed static standing task. Comment No stabilization needed. Functional Mobility   Functional - Mobility Device 4-Wheeled Walker   Activity Retrieve items   Assist Level Modified independent    Functional Mobility Comments Light home making task in ADL kitchen. Bed mobility   Supine to Sit Modified independent   Sit to Supine Modified independent   Transfers   Sit to stand Modified independent   Stand to sit Modified independent   Short term goals   Short term goal 4 MET   Long term goals   Long term goal 3 MET   Assessment   Performance deficits / Impairments Decreased endurance;Decreased strength;Decreased functional mobility ; Decreased high-level IADLs   Treatment Diagnosis Hypoxemic respiratory failure, sepsis   Prognosis Good   Timed Code Treatment Minutes 45 Minutes   Activity Tolerance   Activity Tolerance Patient Tolerated treatment well   Safety Devices   Safety Devices in place Yes   Type of devices Call light within reach   Plan   Current Treatment Recommendations Endurance Training;Functional Mobility Training;Equipment Evaluation, Education, & procurement;Strengthening;Home Management Training Use Topics    Smoking status: Never Smoker    Smokeless tobacco: Never Used    Alcohol use No        Current Outpatient Prescriptions   Medication Sig Dispense Refill    oxyCODONE-acetaminophen (PERCOCET) 5-325 MG per tablet Take 1 tablet by mouth every 6 hours as needed for Pain (Moderate pain, pain scale 4-6) for up to 7 days. 20 tablet 0    dibucaine (NUPERCAINAL) 1 % ointment Apply topically 3 times daily. 1 Tube 1    Cetirizine HCl (ZYRTEC PO) Take 10 mg by mouth       Norgestimate-Eth Estradiol (SPRINTEC 28 PO) Take by mouth       No current facility-administered medications for this visit. Allergies   Allergen Reactions    Sulfa Antibiotics Other (See Comments)     BACK PAIN, DIARRHEA, BLOATING, URINE DISCOLORED       Subjective:      Review of Systems   Constitutional: Positive for activity change and fatigue. Negative for appetite change, chills, diaphoresis, fever and unexpected weight change. HENT: Negative for congestion, dental problem, drooling, ear discharge, ear pain, facial swelling, hearing loss, mouth sores, nosebleeds, postnasal drip, rhinorrhea, sinus pressure, sneezing, sore throat, tinnitus, trouble swallowing and voice change. Eyes: Negative for photophobia, pain, discharge, redness, itching and visual disturbance. Respiratory: Negative for apnea, cough, choking, chest tightness, shortness of breath, wheezing and stridor. Cardiovascular: Negative for chest pain, palpitations and leg swelling. Gastrointestinal: Positive for blood in stool, diarrhea and rectal pain. Negative for abdominal distention, abdominal pain, anal bleeding, constipation, nausea and vomiting. Genitourinary: Positive for difficulty urinating. Negative for decreased urine volume, dyspareunia, dysuria, enuresis, flank pain, frequency, genital sores, hematuria, menstrual problem, pelvic pain, urgency, vaginal bleeding, vaginal discharge and vaginal pain.    Musculoskeletal: Negative for effects of prescribed medications. All patient questions answered. Pt voiced understanding.      Electronically signed by Rick Arrieta CNP on 1/10/2018 at 2:00 PM

## 2021-01-28 NOTE — PROGRESS NOTES
Patient:   Aileen Del Real  MR#:    [id number]   Room:    25/825-01   YOB: 1940  Date of Progress Note: 1/28/2021  Time of Note                           8:39 AM  Consulting Physician:   Boby Monroe M.D. Attending Physician:  Boby Monroe MD     Chief complaint Acute respiratory failure      S:This 79 y.o. male with a past medical history of bladder cancer, HTN, CKD stage III, PVD, hyperlipidemia,CAD,COPD not on home oxygen,ETOH use drinks 2 glasses of wine every night and former smoker. He presented to [address] [place] [address] ER on 1/17/21 with lethargy and confusion. He was found to be Septic with acute respiratory failure with hypercapnia and bilateral pneumonia. He was intubated in ER and admitted to ICU under the hospitalist service. He was also found to have ANA and possible UTI. He was placed on IV  vancomycin and Zosyn. Patient was able to extubated on 1/19/21 and moved out of ICU on 1/20/21. He was still requiring oxygen at 3 l/m nc. He was evaluated by SPT for swallow and placed on a mechanical soft diet with nectar thick liquids. SPT also completed a cognitive eval. He was found to have  functional cognitive skills as evidenced by the Mini-Mental State Exam. Pt has minimal deficits with processing speed. Pt also has mild word-finding deficits in conversation that are resolved with a short response latency. Pt was observed with speech errors occasionally in spontaneous speech utterances. Pt did self-correct errors 1/2x. Sepsis and ANA have now resolved. He is participating in both PT/OT. He is felt to need a stay on Rehab to work towards his goal of returning home with his wife. He is now felt ready to start the Rehab program. No acute issues.     REVIEW OF SYSTEMS:  Constitutional: No fevers No chills  Neck:No stiffness  Respiratory: No shortness of breath  Cardiovascular: No chest pain No palpitations  Gastrointestinal: No abdominal pain    Genitourinary: No Dysuria Neurological: No headache, no confusion    Past Medical History:      Diagnosis Date    Acute liver failure without hepatic coma 10/23/2018    Back pain     \"with tired legs as a result\"    Bladder cancer ([facility] 12/19/2018    Blood circulation, collateral     Carotid arterial disease ([facility]     recent surgery    CKD (chronic kidney disease), 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/2018    GERD (gastroesophageal reflux disease)     Hyperlipidemia     Hypertension     Hypertension     Palliative care patient 10/23/2018    Pneumonia due to infectious organism 11/06/2018    Primary osteoarthritis of left knee 10/14/2018    PVD (peripheral vascular disease) (HCC)     Tremor     Tremor on Right side x 1-2 weeks per stepdaughter    Type 2 diabetes mellitus with complication, without long-term current use of insulin ([facility] 1/21/2021       Past Surgical History:      Procedure Laterality Date    BACK SURGERY      COLONOSCOPY  2007?  CYSTOSCOPY Bilateral 12/19/2018    CYSTOSCOPY, BIOSPY FULGURATION OF BLADDER TUMOR POSSIBLE TURBT, RETROGRADE PYELOGRAM performed by Kyung Aparicio MD at [facility] COLONOSCOPY FLX DX W/COLLJ SPEC WHEN PFRMD N/A 9/11/2017    Dr Tiesha Escobar internal hemorrhoids, diverticular disease-HP-No recall (age)   [facility]awn[facility] [place] REVISE MEDIAN N/CARPAL TUNNEL SURG Left 7/18/2018    OPEN CARPAL TUNNEL RELEASE performed by Marge Good MD at [address] Left 8/28/2018    LEFT CAROTID ENDARTERECTOMY WITH VEIN PATCH ANGIOPLASTY AND COMPLETION ANGIOGRAM performed by Cathleen Booker MD at [place] [address] Left 10/15/2018    LEFT COMPLEX TOTAL KNEE ARTHROPLASTY performed by Marge Good MD at [facility] VASCULAR SURGERY  04/21/2015    Severo Knack M. D. Ultrasound guided access of left common femoral artery. Aortogram.Diagnostic right lower extremity arteriogram.Radiologic supervision and interpretation.  VASCULAR SURGERY  01/13/2015    Alan Cuellar M.D Atherectomy,angioplasty,and stenting of left superficial femoral artery.  VASCULAR SURGERY  03/11/2014    Alan Cuellar M.D. Ultrasound-guided access of right common femoral artery. Aortogram.Left lower extremity arteriogram.Atherectomy and angioplasty of left superficial femoral artery. Radiologic supervision and interpretation.  VASCULAR SURGERY  01/18/2013    Alan BESS Aortogram.Multistation arteriogram right lower extremity. Laser atherectomy and angioplasty of right superficial femoral artery. Selective catheterization of right tibioperoneal trunk. Angioplasty of peroneal artery and tibioperoneal trunk.  VASCULAR SURGERY  10/30/2018    [facility]. Ultrasound guided cannulation of right internal vein. Placement of right internal jugular vein tunneled dialysis catheter bard equistream xk 23cm tip to cuff    VASCULAR SURGERY  12/17/2018    [facility]. Removal of tunneled dilaysis catheter right internal jugular vein.        Medications in Hospital:      Current Facility-Administered Medications:     glipiZIDE (GLUCOTROL) tablet 5 mg, 5 mg, Oral, QAM AC, Shyann Garza MD, 5 mg at 01/28/21 0556    trospium (SANCTURA) tablet 20 mg, 20 mg, Oral, Nightly, Shyann Garza MD, 20 mg at 01/27/21 2109    acetaminophen (TYLENOL) tablet 650 mg, 650 mg, Oral, Q6H PRN **OR** acetaminophen (TYLENOL) suppository 650 mg, 650 mg, Rectal, Q6H PRN, Kate Patten MD    magnesium sulfate 2000 mg in 50 mL IVPB premix, 2 g, Intravenous, PRN, Kate Patten MD    ondansetron [facility]) injection 4 mg, 4 mg, Intravenous, Q6H PRN, Kate Patten MD    polyethylene glycol [facility]) packet 17 g, 17 g, Oral, Daily PRN, Kate Patten MD   potassium chloride (KLOR-CON M) extended release tablet 40 mEq, 40 mEq, Oral, PRN **OR** potassium bicarb-citric acid (EFFER-K) effervescent tablet 40 mEq, 40 mEq, Oral, PRN **OR** potassium chloride 10 mEq/100 mL IVPB (Peripheral Line), 10 mEq, Intravenous, PRN, Melinda Soliman MD    sodium chloride flush 0.9 % injection 10 mL, 10 mL, Intravenous, 2 times per day, Melinda Soliman MD, 10 mL at 01/27/21 2109    sodium chloride flush 0.9 % injection 10 mL, 10 mL, Intravenous, PRN, Melinda Soliman MD    dextrose 5 % solution, 100 mL/hr, Intravenous, PRN, Melinda Soliman MD    dextrose 50 % IV solution, 12.5 g, Intravenous, PRN, Melinda Soliman MD    glucagon (rDNA) injection 1 mg, 1 mg, Intramuscular, PRN, Melinda Soliman MD    glucose (GLUTOSE) 40 % oral gel 15 g, 15 g, Oral, PRN, Melinda Soliman MD    aspirin EC tablet 81 mg, 81 mg, Oral, Daily, Melinda Soliman MD, 81 mg at 01/27/21 0805    atorvastatin (LIPITOR) tablet 20 mg, 20 mg, Oral, Daily, Melinda Soliman MD, 20 mg at 01/27/21 0805    dextrose 50 % IV solution, 25 g, Intravenous, PRN, Melinda Soliman MD    metoprolol succinate (TOPROL XL) extended release tablet 100 mg, 100 mg, Oral, Daily, Melinda Soliman MD, 100 mg at 01/27/21 0805    NIFEdipine (PROCARDIA XL) extended release tablet 60 mg, 60 mg, Oral, Daily, Melinda Soliman MD, 60 mg at 01/27/21 0805    pantoprazole (PROTONIX) tablet 40 mg, 40 mg, Oral, Daily, Melinda Soliman MD, 40 mg at 01/27/21 0805    tamsulosin (FLOMAX) capsule 0.4 mg, 0.4 mg, Oral, Daily, Melinda Soliman MD, 0.4 mg at 01/27/21 0805    acetaminophen (TYLENOL) tablet 650 mg, 650 mg, Oral, Q4H PRN, Jeannie Stroud MD    enoxaparin (LOVENOX) injection 40 mg, 40 mg, Subcutaneous, Daily, Jeannie Stroud MD, 40 mg at 01/27/21 0805    Allergies:  Eliquis [apixaban] and Promethazine hcl    Social History:   TOBACCO:   reports that he quit smoking about 17 years ago.  He has never used smokeless tobacco. ETOH:   reports current alcohol use of about 12.0 standard drinks of alcohol per week. Family History:       Problem Relation Age of Onset    Colon Cancer Father     Diabetes Brother     Colon Polyps Neg Hx     Liver Cancer Neg Hx     Liver Disease Neg Hx     Esophageal Cancer Neg Hx     Rectal Cancer Neg Hx     Stomach Cancer Neg Hx          PHYSICAL EXAM:  /70   Pulse 67   Temp 97 °F (36.1 °C) (Temporal)   Resp 17   Ht 6' (1.829 m)   Wt 241 lb 12.8 oz (109.7 kg)   SpO2 91%   BMI 32.79 kg/m²     Constitutional  well developed, well nourished. Eyes  conjunctiva normal.   Ear, nose, throat - No scars, masses, or lesions over external nose or ears, no atrophy of tongue  Neck-symmetric, no masses noted, no jugular vein distension  Respiration- chest wall appears symmetric, good expansion,   normal effort without use of accessory muscles  Musculoskeletal  no significant wasting of muscles noted, no bony deformities  Extremities-no clubbing, cyanosis or edema  Skin  warm, dry, and intact. No rash, erythema, or pallor. Psychiatric  mood, affect, and behavior appear normal.      Neurological exam  Awake, alert, fluent oriented appropriate affect  Attention and concentration appear appropriate  Recent and remote memory appears unremarkable  Speech normal without dysarthria  No clear issues with language of fund of knowledge     Cranial Nerve Exam     CN III, IV,VI-EOMI, No nystagmus, conjugate eye movements, no ptosis    CN VII-no facial assymetry       Motor Exam  antigravity throughout upper and lower extremities bilaterally      Tremors- no tremors in hands or head noted     Gait  Not tested      Nursing/pcp notes, imaging,labs and vitals reviewed.      PT,OT and/or speech notes reviewed    Lab Results   Component Value Date    WBC 4.6 (L) 01/28/2021    HGB 12.3 (L) 01/28/2021    HCT 39.6 (L) 01/28/2021    MCV 91.0 01/28/2021     01/28/2021     Lab Results   Component Value Date  01/28/2021    K 3.9 01/28/2021     01/28/2021    CO2 31 (H) 01/28/2021    BUN 22 01/28/2021    CREATININE 1.4 (H) 01/28/2021    GLUCOSE 92 01/28/2021    CALCIUM 8.5 (L) 01/28/2021    PROT 5.5 (L) 01/28/2021    LABALBU 3.4 (L) 01/28/2021    BILITOT 0.5 01/28/2021    ALKPHOS 74 01/28/2021    AST 18 01/28/2021    ALT 20 01/28/2021    LABGLOM 49 (A) 01/28/2021    GFRAA >59 01/28/2021     Lab Results   Component Value Date    INR 1.16 01/21/2021    INR 1.08 09/01/2019    INR 1.02 06/10/2019    PROTIME 14.8 (H) 01/21/2021    PROTIME 13.4 09/01/2019    PROTIME 12.8 06/10/2019         Ally Phelan   Student Physical Therapist   Physical Therapy   Progress Notes   Signed   Date of Service:  1/26/2021  2:30 PM               Signed             Show:Clear all  [x]Manual[x]Template[]Copied    Added by:  Claudia Lemus    []Dorian for details       01/26/21 1345   Restrictions/Precautions   Restrictions/Precautions Weight Bearing; Fall Risk   Lower Extremity Weight Bearing Restrictions   Right Lower Extremity Weight Bearing Weight Bearing As Tolerated   Left Lower Extremity Weight Bearing Weight Bearing As Tolerated   General   Additional Pertinent Hx HTN, ACUTE KIDNEY FAILURE, PNEUMONIA, GERD, PVD, OA L KNEE, ACUTE LIVER FAILURE WITH COMA   Diagnosis ACUTE RESP FAILURE VENTILATOR DEPENDENT   Bed Mobility   Rolling Independent   Supine to Sit Independent   Sit to Supine Independent   Scooting Independent   Transfers   Sit to Stand Independent   Stand to sit Independent   Ambulation   Ambulation? Yes   WB Status WBAT BLE'S   More Ambulation? Yes   Ambulation 1   Surface level tile   Device Rolling Walker   Other Apparatus Wheelchair follow   Assistance Stand by assistance   Quality of Gait MILD FFP, HIP DROP ON R IN STATIC AND DYNAMIC POSTURE, DECREASED HIP EXT/DF/TOE OFF ON R. W/ FATIGUE PT TENDS TO KEEP RW TOO ANT TO BODY AND ALSO TENDS TO WB MORE ON OUTSIDE OF R FOOT.    Distance 200FT, 230FT Comments POST AMB BOUTS SPO2 AT 89%, BACK UP TO 93% WITHIN 2 MIN W/ SEATED REST BREAK POST AMB    Other exercises   Other exercises? Yes   Other exercises 1 tandem balance in II bars, 2x30 ea side  (NO UE support)   Other exercises 2 Eyes opened and eyes closed balance, 2x30   (NO UE SUPPORT)   Other exercises 3 side stepping in II bars, 2x 8 ft    Conditions Requiring Skilled Therapeutic Intervention   Assessment PT REQUIRED INCREASED REST BREAKS D/T DECONDITIONED STATUS BUT RECOVERS/IS ABLE TO PARTICIPATE IN THERAPEUTIC EX. PT DEMONSTRATED MORE BALANCE DIFFICULTY IN TANDEM WHEN RLE BEHIND LLE, AND MORE DIFFICULTY W/ SIDE STEPPING TO THE L. KEEP MONITORING SPO2 DURING SESSION, WORK ON AMB ENDURANCE, BALANCE, AND THER EX AT NEXT SESSION. DISCUSSED MAKING PT UP AD FANTA W/IN NEXT FEW DAYS W/ OT. Activity Tolerance   Activity Tolerance Patient limited by fatigue;Patient limited by endurance   Safety Devices   Type of devices    (PT LEFT W/ OT )                Cosigned by: Wanda Staton, PT at 1/27/2021  8:22 AM   Revision History                                    RECORD REVIEW: Previous medical records, medications were reviewed at today's visit    IMPRESSION:   1. Acute respiratory failure/h/o COPD-off abx-monitor  2. CAD-ASA/statin  3. Hyperlipidemia-on statin  4. DVT prophylaxis-Lovenox  5. GI-PPI/bowel regimen  6. DM-oral hypoglycemics-monitor BS  7. HTN-on meds monitor  8. BPH/history of bladder cancer-Flomax  9. Back pain/osteoarthritis-tylenol PRN  10. Acute on chronic renal failure-monitor  11. History of ETOH use  12.  PT/OT/Speech      Continue current care    ELOS Saturday      Expected duration and frequency therapy: 180 minutes per day, 5 days per week    Paulino Seaman  [phone] CELL  Dr Dennis Francisco

## 2021-01-29 PROCEDURE — 97530 THERAPEUTIC ACTIVITIES: CPT

## 2021-01-29 PROCEDURE — 6360000002 HC RX W HCPCS: Performed by: PSYCHIATRY & NEUROLOGY

## 2021-01-29 PROCEDURE — 6370000000 HC RX 637 (ALT 250 FOR IP): Performed by: INTERNAL MEDICINE

## 2021-01-29 PROCEDURE — 97116 GAIT TRAINING THERAPY: CPT

## 2021-01-29 PROCEDURE — 99239 HOSP IP/OBS DSCHRG MGMT >30: CPT | Performed by: PSYCHIATRY & NEUROLOGY

## 2021-01-29 PROCEDURE — 6370000000 HC RX 637 (ALT 250 FOR IP): Performed by: PSYCHIATRY & NEUROLOGY

## 2021-01-29 PROCEDURE — 97110 THERAPEUTIC EXERCISES: CPT

## 2021-01-29 RX ORDER — ATORVASTATIN CALCIUM 20 MG/1
20 TABLET, FILM COATED ORAL DAILY
Qty: 30 TABLET | Refills: 0 | Status: ON HOLD | OUTPATIENT
Start: 2021-01-29 | End: 2021-03-29 | Stop reason: SDUPTHER

## 2021-01-29 RX ORDER — METOPROLOL SUCCINATE 100 MG/1
100 TABLET, EXTENDED RELEASE ORAL DAILY
Qty: 30 TABLET | Refills: 0 | Status: ON HOLD | OUTPATIENT
Start: 2021-01-30 | End: 2021-03-29 | Stop reason: HOSPADM

## 2021-01-29 RX ORDER — TROSPIUM CHLORIDE 20 MG/1
20 TABLET, FILM COATED ORAL NIGHTLY
Qty: 30 TABLET | Refills: 0 | Status: ON HOLD | OUTPATIENT
Start: 2021-01-29 | End: 2021-03-29 | Stop reason: HOSPADM

## 2021-01-29 RX ORDER — PANTOPRAZOLE SODIUM 40 MG/1
40 TABLET, DELAYED RELEASE ORAL DAILY
Qty: 30 TABLET | Refills: 0 | Status: ON HOLD | OUTPATIENT
Start: 2021-01-30 | End: 2021-03-29 | Stop reason: HOSPADM

## 2021-01-29 RX ORDER — TAMSULOSIN HYDROCHLORIDE 0.4 MG/1
0.4 CAPSULE ORAL DAILY
Qty: 30 CAPSULE | Refills: 0 | Status: ON HOLD | OUTPATIENT
Start: 2021-01-30 | End: 2021-03-29 | Stop reason: HOSPADM

## 2021-01-29 RX ORDER — NIFEDIPINE 60 MG/1
60 TABLET, FILM COATED, EXTENDED RELEASE ORAL DAILY
Qty: 30 TABLET | Refills: 0 | Status: ON HOLD | OUTPATIENT
Start: 2021-01-30 | End: 2021-03-29 | Stop reason: HOSPADM

## 2021-01-29 RX ORDER — GLIPIZIDE 5 MG/1
5 TABLET ORAL
Qty: 60 TABLET | Refills: 0 | Status: ON HOLD | OUTPATIENT
Start: 2021-01-30 | End: 2021-03-29

## 2021-01-29 RX ORDER — ASPIRIN 81 MG/1
81 TABLET ORAL DAILY
Qty: 30 TABLET | Refills: 0 | Status: ON HOLD | OUTPATIENT
Start: 2021-01-30 | End: 2021-03-29 | Stop reason: HOSPADM

## 2021-01-29 RX ADMIN — METOPROLOL SUCCINATE 100 MG: 50 TABLET, EXTENDED RELEASE ORAL at 08:14

## 2021-01-29 RX ADMIN — ATORVASTATIN CALCIUM 20 MG: 20 TABLET, FILM COATED ORAL at 08:14

## 2021-01-29 RX ADMIN — GLIPIZIDE 5 MG: 5 TABLET ORAL at 06:07

## 2021-01-29 RX ADMIN — ENOXAPARIN SODIUM 40 MG: 40 INJECTION SUBCUTANEOUS at 08:14

## 2021-01-29 RX ADMIN — ASPIRIN 81 MG: 81 TABLET, COATED ORAL at 08:14

## 2021-01-29 RX ADMIN — PANTOPRAZOLE SODIUM 40 MG: 40 TABLET, DELAYED RELEASE ORAL at 08:14

## 2021-01-29 RX ADMIN — NIFEDIPINE 60 MG: 60 TABLET, FILM COATED, EXTENDED RELEASE ORAL at 08:14

## 2021-01-29 RX ADMIN — TAMSULOSIN HYDROCHLORIDE 0.4 MG: 0.4 CAPSULE ORAL at 08:14

## 2021-01-29 NOTE — CARE COORDINATION
Discharge today after family instruction, as Mr. Leni Rucker has completed IV fluids and up ad mirlande. I had scheduled family instruction with Mrs. Leni Rucker on Monday 1/25, not knowing she had memory difficulties. 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 Today after he contacted her about discharge, her daughter called the nurses station upset-indicating she was in tears not understanding why she was to go to family instruction and her responsibilities. Her daughter indicated she had repeatedly told staff to call her (daughter) due to the memory issues - This is not documented, nor on the report sheet. However,  Mr. Leni Rucker is alert, oriented, asked appropriate questions and accepts home health care services for carry over of care. He states he feels better today than he has in months. Referral made to [facility].

## 2021-01-29 NOTE — PROGRESS NOTES
Occupational Therapy     01/29/21 0815   Restrictions/Precautions   Restrictions/Precautions Up Ad Pau   General   Additional Pertinent Hx COPD--no home O2, bladder CA   Diagnosis Hypoxemic respiratory failure, Sepsis, pneumonia   Subjective   Subjective Discussed discharge plans. Plans to discharge today after FTD. DME ordered and co will deliver to hospital.    ADL   UE Dressing Modified independent    LE Dressing Modified independent    Balance   Standing Balance Independent   Standing Balance   Time 1 min x 3 occ   Activity 2 handed dynamic balance act   Comment focus on maintaining upright posture while completing unsupported standing act   Functional Mobility   Functional - Mobility Device 4-Wheeled Walker   Activity To/From therapy gym   Assist Level Modified independent   (cues to keep close to body and straighten posture)   Functional Mobility Comments reviewed techs for controlling speed of walker   Type of ROM/Therapeutic Exercise   Type of ROM/Therapeutic Exercise Cane/Wand   Comment 2#   Short term goals   Short term goal 6 MET   Long term goals   Long term goals 6 MET   Assessment   Performance deficits / Impairments Decreased functional mobility ; Decreased endurance;Decreased high-level IADLs;Decreased posture   Treatment Diagnosis Hypoxemic respiratory failure, sepsis   Timed Code Treatment Minutes 45 Minutes   Activity Tolerance   Activity Tolerance Patient Tolerated treatment well

## 2021-01-29 NOTE — PROGRESS NOTES
has a past surgical history that includes back surgery; Tonsillectomy and adenoidectomy; Colonoscopy (2007?); pr colonoscopy flx dx w/collj spec when pfrmd (N/A, 9/11/2017); pr revise median n/carpal tunnel surg (Left, 7/18/2018); pr thromboendartectmy neck,neck incis (Left, 8/28/2018); vascular surgery (04/21/2015); vascular surgery (01/13/2015); vascular surgery (03/11/2014); vascular surgery (01/18/2013); pr total knee arthroplasty (Left, 10/15/2018); vascular surgery (10/30/2018); vascular surgery (12/17/2018); and Cystoscopy (Bilateral, 12/19/2018).     Restrictions  Restrictions/Precautions  Restrictions/Precautions: Up Ad Pau  Lower Extremity Weight Bearing Restrictions  Right Lower Extremity Weight Bearing: Weight Bearing As Tolerated  Left Lower Extremity Weight Bearing: Weight Bearing As Tolerated  Objective    Balance  Sitting Balance: Independent  Standing Balance: Independent  Functional Mobility  Functional - Mobility Device: 4-Wheeled Walker  Activity: To/From therapy gym  Assist Level: Modified independent   Functional Mobility Comments: one lap around rehab floor with one rest break  Bed mobility  Supine to Sit: Independent  Sit to Supine: Independent  Comment: in standard bed  Transfers  Sit to stand: Modified independent  Stand to sit: Modified independent           Plan   Plan  Current Treatment Recommendations: Endurance Training, Functional Mobility Training, Strengthening, Home Management Training         Goals  Short term goals  Time Frame for Short term goals: 1 week  Short term goal 1: MET  Short term goal 2: MET  Short term goal 3: MET  Short term goal 4: MET  Short term goal 5: MET  Short term goal 6: MET  Long term goals  Time Frame for Long term goals : 10-14 days  Long term goal 1: MET  Long term goal 2: MET  Long term goal 3: MET  Long term goal 4: MET  Long term goal 5: MET  Long term goals 6: MET  Patient Goals   Patient goals : go home       Therapy Time Individual Concurrent Group Co-treatment   Time In 1825         Time Out 1500         Minutes 45         Timed Code Treatment Minutes: [address], OT

## 2021-01-29 NOTE — DISCHARGE SUMMARY
Neurology Discharge Summary     Patient Identification:  Miguel Baker is a 79 y.o. male. :  1940  Admit Date:  2021  Discharge date : 2021   Attending Provider: Wiley Alvarez MD     Account Number: [de-identified]                                   Admission Diagnoses:   Acute respiratory failure [facility]) [J96.00]    Discharge Diagnoses:  Principal Problem:    Acute respiratory failure ([facility]  Active Problems:    Bilateral carotid artery stenosis    Atherosclerosis of native artery of both lower extremities with intermittent claudication (HCC)    Primary osteoarthritis of left knee    Arthritis of knee    Essential hypertension    Pure hypercholesterolemia    CHF (congestive heart failure) ([facility]    Acute renal failure (HCC)    BPH associated with nocturia    Pneumonia due to infectious organism    Bladder cancer ([facility]    History of bladder cancer    Severe sepsis (HCC)    Acute on chronic respiratory failure (HCC)    UTI (urinary tract infection)    Hypoxemia    Metabolic acidosis    Acidosis, metabolic, with respiratory acidosis    Type 2 diabetes mellitus with complication, without long-term current use of insulin ([facility])    Weakness    Other dysphagia    Low back pain  Resolved Problems:    * No resolved hospital problems.  *      Discharge Medications:    Current Discharge Medication List           Details   glipiZIDE (GLUCOTROL) 5 MG tablet Take 1 tablet by mouth every morning (before breakfast)  Qty: 60 tablet, Refills: 0      trospium (SANCTURA) 20 MG tablet Take 1 tablet by mouth nightly  Qty: 30 tablet, Refills: 0              Details   aspirin 81 MG EC tablet Take 1 tablet by mouth daily  Qty: 30 tablet, Refills: 0      atorvastatin (LIPITOR) 20 MG tablet Take 1 tablet by mouth daily  Qty: 30 tablet, Refills: 0      metoprolol succinate (TOPROL XL) 100 MG extended release tablet Take 1 tablet by mouth daily  Qty: 30 tablet, Refills: 0 Hospital Course: This 79 y.o. male with a past medical history of bladder cancer, HTN, CKD stage III, PVD, hyperlipidemia,CAD,COPD not on home oxygen,ETOH use drinks 2 glasses of wine every night and former smoker. He presented to [facility] ER on 1/17/21 with lethargy and confusion. He was found to be Septic with acute respiratory failure with hypercapnia and bilateral pneumonia. He was intubated in ER and admitted to ICU under the hospitalist service. He was also found to have ANA and possible UTI. He was placed on IV  vancomycin and Zosyn. Patient was able to extubated on 1/19/21 and moved out of ICU on 1/20/21. He was still requiring oxygen at 3 l/m nc. He was evaluated by SPT for swallow and placed on a mechanical soft diet with nectar thick liquids. SPT also completed a cognitive eval. He was found to have  functional cognitive skills as evidenced by the Mini-Mental State Exam. Pt has minimal deficits with processing speed. Pt also had mild word-finding deficits in conversation that resolved with a short response latency. Pt was observed with speech errors occasionally in spontaneous speech utterances. Pt did self-correct errors 1/2x. He was participating in both PT/OT. He was felt to need a stay on Rehab to work towards his goal of returning home with his wife. The patient was admitted to inpatient rehab with improvement. Plan DC home with home health if the patient is agreeable.          Discharge Instructions     Patient Instructions:   Home  Therapy orders: PT, OT and nursing    Discharge lab work: none  Code status: Full Code   Activity: activity as tolerated  Diet: DIET GENERAL;    Wound Care: as directed  Equipment: as per therapy      Boby Monroe MD    At least 35 minutes were spent in discharging the patient

## 2021-01-29 NOTE — PLAN OF CARE
Problem: SAFETY  Goal: Free from accidental physical injury  1/28/2021 2323 by Tomas Montgomery RN  Outcome: Ongoing  1/28/2021 1837 by Vishal Wylie RN  Outcome: Ongoing  Goal: Free from intentional harm  1/28/2021 2323 by Tomas Montgomery RN  Outcome: Ongoing  1/28/2021 1837 by Vishal Wylie RN  Outcome: Ongoing  Goal: LTG - Patient will demonstrate safety requirements appropriate to situation/environment  1/28/2021 2323 by Tomas Montgomery RN  Outcome: Ongoing  1/28/2021 1837 by Vishal Wylie RN  Outcome: Ongoing  Goal: LTG - patient will utilize safety techniques  1/28/2021 2323 by Tomas Montgomery RN  Outcome: Ongoing  1/28/2021 1837 by Vishal Wylie RN  Outcome: Ongoing  Goal: STG - patient locks brakes on wheelchair  1/28/2021 2323 by Tomas Montgomery RN  Outcome: Ongoing  1/28/2021 1837 by Vishal Wylie RN  Outcome: Ongoing  Goal: STG - Patient uses call light consistently to request assistance with transfers  1/28/2021 2323 by Tomas Montgomery RN  Outcome: Ongoing  1/28/2021 1837 by Vishal Wylie RN  Outcome: Ongoing  Goal: STG - patient uses gait belt during all transfers  1/28/2021 2323 by Tomas Montgomery RN  Outcome: Ongoing  1/28/2021 1837 by Vishal Wylie RN  Outcome: Ongoing     Problem: PAIN  Goal: Patient's pain/discomfort is manageable  1/28/2021 2323 by Tomas Montgomery RN  Outcome: Ongoing  1/28/2021 1837 by Vishal Wylie RN  Outcome: Ongoing  Goal: STG - patients pain is managed to allow active participation in daily activities  1/28/2021 2323 by Tomas Montgomery RN  Outcome: Ongoing  1/28/2021 1837 by Vishal Wylie RN  Outcome: Ongoing     Problem: SKIN INTEGRITY  Goal: Skin integrity is maintained or improved  1/28/2021 2323 by Tomas Montgomery RN  Outcome: Ongoing  1/28/2021 1837 by Vishal Wylie RN  Outcome: Ongoing  Goal: STG - patient will maintain good skin integrity  1/28/2021 2323 by Tomas Montgomery RN  Outcome: Ongoing  1/28/2021 1837 by Vishal Wylie RN Outcome: Ongoing     Problem: IP BREATHING  Goal: STG - respiratory rate and effort will be within normal limits for the patient  1/28/2021 2323 by Odessa Jeans, RN  Outcome: Ongoing  1/28/2021 1837 by Garry Tena RN  Outcome: Ongoing  Goal: STG - patient/caregiver will be able to verbalize oxygen safety precautions  1/28/2021 2323 by Odessa Jeans, RN  Outcome: Ongoing  1/28/2021 1837 by Garry Tena RN  Outcome: Ongoing  Goal: STG - patient will utilize incentive spirometer  1/28/2021 2323 by Odessa Jeans, RN  Outcome: Ongoing  1/28/2021 1837 by Garry Tena RN  Outcome: Ongoing

## 2021-01-29 NOTE — PROGRESS NOTES
Sammi Sonia  [id number]     01/29/21 1413 01/29/21 1414 01/29/21 1415   Subjective   Subjective Pt agreed to therapy this afternoon. --   --    Bed Mobility   Rolling  --  Independent  --    Supine to Sit  --  Independent  --    Sit to Supine  --  Independent  --    Transfers   Sit to Stand  --  Independent  --    Stand to sit  --  Independent  --    Bed to Chair  --  Independent  --    Car Transfer  --  Independent  --    Ambulation   Ambulation?  --  Yes  --    WB Status  --  WBAT BLE'S  --    Ambulation 1   Surface  --  level tile  --    Device  --  Rollator  --    Assistance  --  Independent  --    Quality of Gait  --  Forward flexed posture. --    Distance  --  250' x2  --    Stairs/Curb   Stairs?  --  Yes  --    Stairs   # Steps   --  12  --    Stairs Height  --  4\"  --    Rails  --  Bilateral  --    Device  --  No Device  --    Assistance  --  Independent  --    Comment  --  Pt amb well up and down stairs  --    Exercises   Comments  --   --  Performed FTD w/ wife. Conditions Requiring Skilled Therapeutic Intervention   Body structures, Functions, Activity limitations  --   --  Decreased strength;Decreased endurance   Assessment  --   --  Performed FTD w/ wife.    Activity Tolerance   Activity Tolerance  --   --  Patient Tolerated treatment well   Electronically signed by Сергей Kidd PTA on 1/29/2021 at 2:16 PM

## 2021-01-29 NOTE — PROGRESS NOTES
Patient:   Christel Sinha  MR#:    [id number]   Room:    0825/825-01   YOB: 1940  Date of Progress Note: 1/29/2021  Time of Note                           1:15 PM  Consulting Physician:   Alessia Estrada M.D. Attending Physician:  Alessia Estrada MD     Chief complaint Acute respiratory failure      S:This 79 y.o. male with a past medical history of bladder cancer, HTN, CKD stage III, PVD, hyperlipidemia,CAD,COPD not on home oxygen,ETOH use drinks 2 glasses of wine every night and former smoker. He presented to [facility] ER on 1/17/21 with lethargy and confusion. He was found to be Septic with acute respiratory failure with hypercapnia and bilateral pneumonia. He was intubated in ER and admitted to ICU under the hospitalist service. He was also found to have ANA and possible UTI. He was placed on IV  vancomycin and Zosyn. Patient was able to extubated on 1/19/21 and moved out of ICU on 1/20/21. He was still requiring oxygen at 3 l/m nc. He was evaluated by SPT for swallow and placed on a mechanical soft diet with nectar thick liquids. SPT also completed a cognitive eval. He was found to have  functional cognitive skills as evidenced by the Mini-Mental State Exam. Pt has minimal deficits with processing speed. Pt also has mild word-finding deficits in conversation that are resolved with a short response latency. Pt was observed with speech errors occasionally in spontaneous speech utterances. Pt did self-correct errors 1/2x. Sepsis and ANA have now resolved. He is participating in both PT/OT. He is felt to need a stay on Rehab to work towards his goal of returning home with his wife. He is now felt ready to start the Rehab program. No new issues overnight.     REVIEW OF SYSTEMS:  Constitutional: No fevers No chills  Neck:No stiffness  Respiratory: No shortness of breath  Cardiovascular: No chest pain No palpitations  Gastrointestinal: No abdominal pain    Genitourinary: No Dysuria Neurological: No headache, no confusion    Past Medical History:      Diagnosis Date    Acute liver failure without hepatic coma 10/23/2018    Back pain     \"with tired legs as a result\"    Bladder cancer ([facility] [address] 12/19/2018    Blood circulation, collateral     Carotid arterial disease ([address]     recent surgery    CKD (chronic kidney disease), 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/2018    GERD (gastroesophageal reflux disease)     Hyperlipidemia     Hypertension     Hypertension     Palliative care patient 10/23/2018    Pneumonia due to infectious organism 11/06/2018    Primary osteoarthritis of left knee 10/14/2018    PVD (peripheral vascular disease) (HCC)     Tremor     Tremor on Right side x 1-2 weeks per stepdaughter    Type 2 diabetes mellitus with complication, without long-term current use of insulin ([facility] [address] 1/21/2021       Past Surgical History:      Procedure Laterality Date    BACK SURGERY      COLONOSCOPY  2007?  CYSTOSCOPY Bilateral 12/19/2018    CYSTOSCOPY, BIOSPY FULGURATION OF BLADDER TUMOR POSSIBLE TURBT, RETROGRADE PYELOGRAM performed by Prince Rebekah MD at [facility] COLONOSCOPY FLX DX W/COLLJ SPEC WHEN PFRMD N/A 9/11/2017    Dr Ailyn Liang internal hemorrhoids, diverticular disease-HP-No recall (age)   [de-identified] MS REVISE MEDIAN N/CARPAL TUNNEL SURG Left 7/18/2018    OPEN CARPAL TUNNEL RELEASE performed by Wilfrid Emmanuel MD at [address] Left 8/28/2018    LEFT CAROTID ENDARTERECTOMY WITH VEIN PATCH ANGIOPLASTY AND COMPLETION ANGIOGRAM performed by Casey Sánchez MD at [address] 10/15/2018    LEFT COMPLEX TOTAL KNEE ARTHROPLASTY performed by Wilfrid Emmanuel MD at [facility] VASCULAR SURGERY  04/21/2015    Colt BESS Ultrasound guided access of left common femoral artery. Aortogram.Diagnostic right lower extremity arteriogram.Radiologic supervision and interpretation.  VASCULAR SURGERY  01/13/2015    Jae Ordaz M.D Atherectomy,angioplasty,and stenting of left superficial femoral artery.  VASCULAR SURGERY  03/11/2014    Jae Ordaz M.D. Ultrasound-guided access of right common femoral artery. Aortogram.Left lower extremity arteriogram.Atherectomy and angioplasty of left superficial femoral artery. Radiologic supervision and interpretation.  VASCULAR SURGERY  01/18/2013    Jae BESS Aortogram.Multistation arteriogram right lower extremity. Laser atherectomy and angioplasty of right superficial femoral artery. Selective catheterization of right tibioperoneal trunk. Angioplasty of peroneal artery and tibioperoneal trunk.  VASCULAR SURGERY  10/30/2018    SJS. Ultrasound guided cannulation of right internal vein. Placement of right internal jugular vein tunneled dialysis catheter bard equistream xk 23cm tip to cuff    VASCULAR SURGERY  12/17/2018    SJS. Removal of tunneled dilaysis catheter right internal jugular vein.        Medications in Hospital:      Current Facility-Administered Medications:     glipiZIDE (GLUCOTROL) tablet 5 mg, 5 mg, Oral, QAM AC, Angus Vale MD, 5 mg at 01/29/21 0607    trospium (SANCTURA) tablet 20 mg, 20 mg, Oral, Nightly, Angus Vale MD, 20 mg at 01/28/21 2043    acetaminophen (TYLENOL) tablet 650 mg, 650 mg, Oral, Q6H PRN **OR** acetaminophen (TYLENOL) suppository 650 mg, 650 mg, Rectal, Q6H PRN, Pinky Alva MD    magnesium sulfate 2000 mg in 50 mL IVPB premix, 2 g, Intravenous, PRN, Pinky Alva MD    ondansetron [facility]) injection 4 mg, 4 mg, Intravenous, Q6H PRN, Pinky Alva MD    polyethylene glycol [facility]) packet 17 g, 17 g, Oral, Daily PRN, Pinky Alva MD   potassium chloride (KLOR-CON M) extended release tablet 40 mEq, 40 mEq, Oral, PRN **OR** potassium bicarb-citric acid (EFFER-K) effervescent tablet 40 mEq, 40 mEq, Oral, PRN **OR** potassium chloride 10 mEq/100 mL IVPB (Peripheral Line), 10 mEq, Intravenous, PRN, Ricco Pérez MD    sodium chloride flush 0.9 % injection 10 mL, 10 mL, Intravenous, PRN, Ricco Pérez MD    dextrose 5 % solution, 100 mL/hr, Intravenous, PRN, Ricco Pérez MD    dextrose 50 % IV solution, 12.5 g, Intravenous, PRN, Ricco Pérez MD    glucagon (rDNA) injection 1 mg, 1 mg, Intramuscular, PRN, Ricco Pérez MD    glucose (GLUTOSE) 40 % oral gel 15 g, 15 g, Oral, PRN, Ricco Pérez MD    aspirin EC tablet 81 mg, 81 mg, Oral, Daily, Ricco Pérez MD, 81 mg at 01/29/21 2099    atorvastatin (LIPITOR) tablet 20 mg, 20 mg, Oral, Daily, Ricco Pérez MD, 20 mg at 01/29/21 0814    dextrose 50 % IV solution, 25 g, Intravenous, PRN, Ricco Pérez MD    metoprolol succinate (TOPROL XL) extended release tablet 100 mg, 100 mg, Oral, Daily, Ricco Pérez MD, 100 mg at 01/29/21 0814    NIFEdipine (PROCARDIA XL) extended release tablet 60 mg, 60 mg, Oral, Daily, Ricoc Pérez MD, 60 mg at 01/29/21 0814    pantoprazole (PROTONIX) tablet 40 mg, 40 mg, Oral, Daily, Ricco Pérez MD, 40 mg at 01/29/21 0814    tamsulosin (FLOMAX) capsule 0.4 mg, 0.4 mg, Oral, Daily, Ricco Pérez MD, 0.4 mg at 01/29/21 0490    acetaminophen (TYLENOL) tablet 650 mg, 650 mg, Oral, Q4H PRN, Geovany Mendez MD    enoxaparin (LOVENOX) injection 40 mg, 40 mg, Subcutaneous, Daily, Geovany Mendez MD, 40 mg at 01/29/21 9941    Allergies:  Eliquis [apixaban] and Promethazine hcl    Social History:   TOBACCO:   reports that he quit smoking about 17 years ago. He has never used smokeless tobacco.  ETOH:   reports current alcohol use of about 12.0 standard drinks of alcohol per week.     Family History:       Problem Relation Age of Onset    Colon Cancer Father  Diabetes Brother     Colon Polyps Neg Hx     Liver Cancer Neg Hx     Liver Disease Neg Hx     Esophageal Cancer Neg Hx     Rectal Cancer Neg Hx     Stomach Cancer Neg Hx          PHYSICAL EXAM:  /78   Pulse 70   Temp 98 °F (36.7 °C) (Temporal)   Resp 18   Ht 6' (1.829 m)   Wt 241 lb 12.8 oz (109.7 kg)   SpO2 92%   BMI 32.79 kg/m²     Constitutional  well developed, well nourished. Eyes  conjunctiva normal.   Ear, nose, throat - No scars, masses, or lesions over external nose or ears, no atrophy of tongue  Neck-symmetric, no masses noted, no jugular vein distension  Respiration- chest wall appears symmetric, good expansion,   normal effort without use of accessory muscles  Musculoskeletal  no significant wasting of muscles noted, no bony deformities  Extremities-no clubbing, cyanosis or edema  Skin  warm, dry, and intact. No rash, erythema, or pallor. Psychiatric  mood, affect, and behavior appear normal.      Neurological exam  Awake, alert, fluent oriented appropriate affect  Attention and concentration appear appropriate  Recent and remote memory appears unremarkable  Speech normal without dysarthria  No clear issues with language of fund of knowledge     Cranial Nerve Exam     CN III, IV,VI-EOMI, No nystagmus, conjugate eye movements, no ptosis    CN VII-no facial assymetry       Motor Exam  antigravity throughout upper and lower extremities bilaterally      Tremors- no tremors in hands or head noted     Gait  Not tested      Nursing/pcp notes, imaging,labs and vitals reviewed.      PT,OT and/or speech notes reviewed    Lab Results   Component Value Date    WBC 4.6 (L) 01/28/2021    HGB 12.3 (L) 01/28/2021    HCT 39.6 (L) 01/28/2021    MCV 91.0 01/28/2021     01/28/2021     Lab Results   Component Value Date     01/28/2021    K 3.9 01/28/2021     01/28/2021    CO2 31 (H) 01/28/2021    BUN 22 01/28/2021    CREATININE 1.4 (H) 01/28/2021    GLUCOSE 92 01/28/2021 CALCIUM 8.5 (L) 01/28/2021    PROT 5.5 (L) 01/28/2021    LABALBU 3.4 (L) 01/28/2021    BILITOT 0.5 01/28/2021    ALKPHOS 74 01/28/2021    AST 18 01/28/2021    ALT 20 01/28/2021    LABGLOM 49 (A) 01/28/2021    GFRAA >59 01/28/2021     Lab Results   Component Value Date    INR 1.16 01/21/2021    INR 1.08 09/01/2019    INR 1.02 06/10/2019    PROTIME 14.8 (H) 01/21/2021    PROTIME 13.4 09/01/2019    PROTIME 12.8 06/10/2019         Yury Johnston PTA   Therapy Assistant   Physical Therapy   Progress Notes   Cosign Needed   Date of Service:  1/29/2021 10:42 AM               Cosign Needed             Show:Clear all  []Manual[x]Template[]Copied    Added by:  [x]Bipin Zepeda PTA    []Dorian for details  Aileen Del Real  [id number]       01/29/21 1033 01/29/21 1034   Subjective   Subjective Pt agreed to therapy this morning.  --    Bed Mobility   Rolling Independent  --    Supine to Sit Independent  --    Sit to Supine Independent  --    Transfers   Sit to Stand Independent  --    Stand to sit Independent  --    Bed to Chair Independent  --    Car Transfer Independent  --    Ambulation   Ambulation? Yes  --    WB Status WBAT BLE'S  --    Ambulation 1   Surface level tile  --    Device Rollator  --    Assistance Independent  --    Quality of Gait Forward flexed posture. --    Distance 250' x2  --    Stairs/Curb   Stairs? Yes  --    Stairs   # Steps  12  --    Stairs Height 4\"  --    Rails Bilateral  --    Device No Device  --    Assistance Independent  --    Comment Pt amb well up and down stairs  --    Conditions Requiring Skilled Therapeutic Intervention   Body structures, Functions, Activity limitations  --  Decreased strength;Decreased endurance   Assessment  --  Pt tolerated session well w/ minimal fatigue.    Prognosis  --  Good   Activity Tolerance   Activity Tolerance  --  Patient Tolerated treatment well   Electronically signed by Yury Johnston PTA on 1/29/2021 at 10:43 AM    RECORD REVIEW: Previous medical records, medications were reviewed at today's visit    IMPRESSION:   1. Acute respiratory failure/h/o COPD-off abx-monitor  2. CAD-ASA/statin  3. Hyperlipidemia-on statin  4. DVT prophylaxis-Lovenox  5. GI-PPI/bowel regimen  6. DM-oral hypoglycemics-monitor BS  7. HTN-on meds monitor  8. BPH/history of bladder cancer-Flomax  9. Back pain/osteoarthritis-tylenol PRN  10. Acute on chronic renal failure-monitor  11. History of ETOH use  12.  PT/OT/Speech      DC home today    Expected duration and frequency therapy: 180 minutes per day, 5 days per week    [address] [facility]  [phone] CELL  Dr Marichuy Gonzalez

## 2021-01-29 NOTE — PROGRESS NOTES
Vannie Fleischer  [id number]     01/29/21 1033 01/29/21 1034   Subjective   Subjective Pt agreed to therapy this morning.  --    Bed Mobility   Rolling Independent  --    Supine to Sit Independent  --    Sit to Supine Independent  --    Transfers   Sit to Stand Independent  --    Stand to sit Independent  --    Bed to Chair Independent  --    Car Transfer Independent  --    Ambulation   Ambulation? Yes  --    WB Status WBAT BLE'S  --    Ambulation 1   Surface level tile  --    Device Rollator  --    Assistance Independent  --    Quality of Gait Forward flexed posture. --    Distance 250' x2  --    Stairs/Curb   Stairs? Yes  --    Stairs   # Steps  12  --    Stairs Height 4\"  --    Rails Bilateral  --    Device No Device  --    Assistance Independent  --    Comment Pt amb well up and down stairs  --    Conditions Requiring Skilled Therapeutic Intervention   Body structures, Functions, Activity limitations  --  Decreased strength;Decreased endurance   Assessment  --  Pt tolerated session well w/ minimal fatigue.    Prognosis  --  Good   Activity Tolerance   Activity Tolerance  --  Patient Tolerated treatment well   Electronically signed by Serg Maharaj PTA on 1/29/2021 at 10:43 AM

## 2021-02-01 VITALS
BODY MASS INDEX: 32.75 KG/M2 | DIASTOLIC BLOOD PRESSURE: 78 MMHG | HEART RATE: 70 BPM | WEIGHT: 241.8 LBS | RESPIRATION RATE: 18 BRPM | HEIGHT: 72 IN | OXYGEN SATURATION: 92 % | TEMPERATURE: 98 F | SYSTOLIC BLOOD PRESSURE: 135 MMHG

## 2021-02-01 NOTE — DISCHARGE SUMMARY
Short term goal 3: SBA  FEET W/OUT W/C FOLLOW  Short term goal 4: INDEP W/C PROPULSION 150 FEET  Short term goal 5: CGA 4 STEPS 2 RAILS    Long term goals  Time Frame for Long term goals : 2 TO 3 WEEKS  Long term goal 1: INDEP TRANSFERS  Long term goal 2: INDEP AMB WITHOUT  FEET  Long term goal 3: INDEP 4 STEPS  Long term goal 4: INDEP CAR TRANSFER  Long term goal 5: INDPE HEP  HOME LIVING:     Type of Home: House  Home Layout: One level  Home Access: Stairs to enter with rails  Entrance Stairs - Number of Steps: 2  Entrance Stairs - Rails: Both  ASSESSMENT (IMPAIRMENTS/BARRIERS): Body structures, Functions, Activity limitations: Decreased strength, Decreased endurance  Assessment: Performed FTD w/ wife.   Activity Tolerance: Patient Tolerated treatment well     PLAN:  Discharge Recommendations: Home with Home health PT     PATIENT GOAL FOR REHAB:  RETURN TO PRIOR LEVEL OF FUNCTION       IRF/RHONDA  Roll Left and Right  Assistance Needed: Independent  CARE Score: 6  Discharge Goal: Independent    Sit to Lying  Assistance Needed: Independent  CARE Score: 6  Discharge Goal: Independent    Lying to Sitting on Side of Bed  Assistance Needed: Independent  CARE Score: 6  Discharge Goal: Independent    Sit to Stand  Assistance Needed: Independent  CARE Score: 6  Discharge Goal: Independent    Chair/Bed-to-Chair Transfer  Assistance Needed: Independent  CARE Score: 6  Discharge Goal: Independent    Car Transfer  Assistance Needed: Independent  Reason if not Attempted: Not attempted due to medical condition or safety concerns  CARE Score: 6  Discharge Goal: Independent    Walk 10 Feet  Assistance Needed: Independent  CARE Score: 6  Discharge Goal: Independent    Walk 50 Feet with Two Turns  Assistance Needed: Independent  CARE Score: 6  Discharge Goal: Independent    Walk 150 Feet  Assistance Needed: Independent  CARE Score: 6  Discharge Goal: Independent    Walking 10 Feet on Uneven Surfaces Reason if not Attempted: Not attempted due to medical condition or safety concerns  CARE Score: 88  Discharge Goal: Not Attempted    1 Step (Curb)  Assistance Needed: Independent  Reason if not Attempted: Not attempted due to medical condition or safety concerns  CARE Score: 6  Discharge Goal: Independent    4 Steps  Assistance Needed: Independent  Reason if not Attempted: Not attempted due to medical condition or safety concerns  CARE Score: 6  Discharge Goal: Independent    12 Steps  Assistance Needed: Independent  Reason if not Attempted: Not attempted due to medical condition or safety concerns  CARE Score: 6  Discharge Goal: Supervision or touching assistance    Wheel 50 Feet with Two Turns  Assistance Needed: Independent  CARE Score: 6  Discharge Goal: Independent    Wheel 150 Feet  Assistance Needed: Supervision or touching assistance  CARE Score: 4  Discharge Goal: Independent      Electronically signed by Mary Benavidez PT on 2/1/21 at 8:45 AM CST

## 2021-02-01 NOTE — DISCHARGE SUMMARY
Occupational Therapy Discharge Summary         Date: 2021  Patient Name: Yoshi Willett        MRN: [id number]    : 1940  (79 y.o.)  Gender: male      Diagnosis: ACUTE RESP FAILURE VENTILATOR DEPENDENT  Restrictions/Precautions  Restrictions/Precautions: Up Ad Pau      Discharge Date: 21    UE Functioning:  WFLs    Home Management:  Functional Mobility  Functional - Mobility Device: 4-Wheeled Walker  Activity: Household distance ambulation tasks  Assist Level: Modified independent     Adaptive Equipment/DME Status:  Bathroom Equipment: Built-in shower seat  Home Equipment: Cane  Arranged Rollator    Pain Assessment:  Pain Level: 4  Pain Location: Back    Remaining Problems:  Decreased functional mobility ;  Decreased endurance; Decreased high-level IADLs; Decreased posture    STGs:  Short term goals  Time Frame for Short term goals: 1 week  Short term goal 1: complete overall toileting with supervision  Short term goal 2: complete LB dressing with supervision  Short term goal 3: complete overall bathing with supervision  Short term goal 4: complete ambulatory home making task with supervision  Short term goal 5: complete 1-2 handed standing level task for 3 mins with supervision  Short term goal 6: complete HEP x 5 occasions in order to improve strength and endurance for ADLs   Met all STGs    LTGs:  Long term goals  Time Frame for Long term goals : 10-14 days  Long term goal 1: complete overall bathing with modified independence  Long term goal 2: complete overall toileting with modified independence  Long term goal 3: complete simple ambulatory home making task with modified independence  Long term goal 4: complete overall dressing with modified independence  Long term goal 5: complete HEP with independence  Long term goals 6: pt/family verbalize DME  Met all LTGs    Discharge Setting and Recommendations: Home with spouse and Home Health Occupational Therapy to address safety and independence in the home environment.      Discharge Care Scores  Eating: CARE Score: 6  Oral Hygiene: CARE Score: 6  Toileting: CARE Score: 6  Shower/Bathe: CARE Score: 6  Upper Body Dressing: CARE Score: 6  Lower Body Dressing: CARE Score: 6  Footwear: CARE Score: 6  Toilet Transfers: CARE Score: 6  Picking Up Object:  CARE Score: 6    Electronically signed by YSABEL Zhang on 2/1/21 at 1:07 PM CST

## 2021-02-02 LAB — HBA1C MFR BLD: 6.3 % (ref 4–6)

## 2021-02-05 ENCOUNTER — TELEPHONE (OUTPATIENT)
Dept: NEUROLOGY | Age: 81
End: 2021-02-05

## 2021-02-05 NOTE — TELEPHONE ENCOUNTER
Alyson Castellanos from [facility] health called stating that the patient complained of having constipation. Upset stomach, and running a temperature of 102. He would like a nurse to be sent out to his home today. Alyson Castellanos states she will need orders for PRN nursing visits. Please advise.

## 2021-02-09 NOTE — TELEPHONE ENCOUNTER
I called home health, s/w Clovis Arrieta, she states patient was seen at home yesterday 2/8/21 for a regular visit w/his nurse. He was noted to still have a temp of 100 along w/abdominal discomfort that his wife associates with constipation. Patient would not take tylenol for any relief of fever or symptoms stating that he cannot take tylenol due to his other medications. I did not see anything to contraindicate the tylenol on his chart, nor any allergy listed to it. [facility] advised patient to seek treatment at the ED due to recently being discharged after his in patient stay for sepsis. Patient declined treatment in the emergency room per Clovis Arrieta with [facility]. They would like orders of some kind for the patient. They will accept verbal order by phone. Please advise.

## 2021-02-09 NOTE — TELEPHONE ENCOUNTER
Ok for tylenol 500 mg po q 6 prn temperature over 99. 4. ok for Magnesium Citrate 1/2 to one bottle daily PRN for constipation

## 2021-02-10 NOTE — TELEPHONE ENCOUNTER
[facility] and spoke with Lady Valdez. I gave her a verbal order.

## 2021-02-12 ENCOUNTER — APPOINTMENT (OUTPATIENT)
Dept: GENERAL RADIOLOGY | Age: 81
DRG: 871 | End: 2021-02-12
Payer: MEDICARE

## 2021-02-12 ENCOUNTER — HOSPITAL ENCOUNTER (INPATIENT)
Age: 81
LOS: 12 days | Discharge: HOME HEALTH | DRG: 871 | End: 2021-02-24
Attending: EMERGENCY MEDICINE
Payer: MEDICARE

## 2021-02-12 DIAGNOSIS — J18.9 PNEUMONIA OF BOTH LUNGS DUE TO INFECTIOUS ORGANISM, UNSPECIFIED PART OF LUNG: ICD-10-CM

## 2021-02-12 DIAGNOSIS — A41.9 SEPSIS, DUE TO UNSPECIFIED ORGANISM, UNSPECIFIED WHETHER ACUTE ORGAN DYSFUNCTION PRESENT (HCC): Primary | ICD-10-CM

## 2021-02-12 DIAGNOSIS — J96.01 ACUTE RESPIRATORY FAILURE WITH HYPOXIA (HCC): ICD-10-CM

## 2021-02-12 DIAGNOSIS — R77.8 ELEVATED TROPONIN: ICD-10-CM

## 2021-02-12 DIAGNOSIS — N17.9 AKI (ACUTE KIDNEY INJURY) (HCC): ICD-10-CM

## 2021-02-12 LAB
ADENOVIRUS BY PCR: NOT DETECTED
ALBUMIN SERPL-MCNC: 3.5 G/DL (ref 3.5–5.2)
ALP BLD-CCNC: 130 U/L (ref 40–130)
ALT SERPL-CCNC: 30 U/L (ref 5–41)
ANION GAP SERPL CALCULATED.3IONS-SCNC: 12 MMOL/L (ref 7–19)
AST SERPL-CCNC: 47 U/L (ref 5–40)
BASE EXCESS ARTERIAL: 4.5 MMOL/L (ref -2–2)
BASOPHILS ABSOLUTE: 0 K/UL (ref 0–0.2)
BASOPHILS RELATIVE PERCENT: 0.6 % (ref 0–1)
BILIRUB SERPL-MCNC: 0.4 MG/DL (ref 0.2–1.2)
BORDETELLA PARAPERTUSSIS BY PCR: NOT DETECTED
BORDETELLA PERTUSSIS BY PCR: NOT DETECTED
BUN BLDV-MCNC: 49 MG/DL (ref 8–23)
CALCIUM SERPL-MCNC: 9 MG/DL (ref 8.8–10.2)
CARBOXYHEMOGLOBIN ARTERIAL: 1.9 % (ref 0–5)
CHLAMYDOPHILIA PNEUMONIAE BY PCR: NOT DETECTED
CHLORIDE BLD-SCNC: 93 MMOL/L (ref 98–111)
CO2: 28 MMOL/L (ref 22–29)
CORONAVIRUS 229E BY PCR: NOT DETECTED
CORONAVIRUS HKU1 BY PCR: NOT DETECTED
CORONAVIRUS NL63 BY PCR: NOT DETECTED
CORONAVIRUS OC43 BY PCR: NOT DETECTED
CREAT SERPL-MCNC: 2.2 MG/DL (ref 0.5–1.2)
EOSINOPHILS ABSOLUTE: 0 K/UL (ref 0–0.6)
EOSINOPHILS RELATIVE PERCENT: 0 % (ref 0–5)
GFR AFRICAN AMERICAN: 35
GFR NON-AFRICAN AMERICAN: 29
GLUCOSE BLD-MCNC: 217 MG/DL (ref 70–99)
GLUCOSE BLD-MCNC: 251 MG/DL (ref 74–109)
HCO3 ARTERIAL: 30.4 MMOL/L (ref 22–26)
HCT VFR BLD CALC: 43.7 % (ref 42–52)
HEMOGLOBIN, ART, EXTENDED: 14.2 G/DL (ref 14–18)
HEMOGLOBIN: 13.9 G/DL (ref 14–18)
HUMAN METAPNEUMOVIRUS BY PCR: NOT DETECTED
HUMAN RHINOVIRUS/ENTEROVIRUS BY PCR: NOT DETECTED
IMMATURE GRANULOCYTES #: 0.2 K/UL
INFLUENZA A BY PCR: NOT DETECTED
INFLUENZA B BY PCR: NOT DETECTED
LACTIC ACID: 2 MMOL/L (ref 0.5–1.9)
LYMPHOCYTES ABSOLUTE: 0.6 K/UL (ref 1.1–4.5)
LYMPHOCYTES RELATIVE PERCENT: 12 % (ref 20–40)
MCH RBC QN AUTO: 27.9 PG (ref 27–31)
MCHC RBC AUTO-ENTMCNC: 31.8 G/DL (ref 33–37)
MCV RBC AUTO: 87.8 FL (ref 80–94)
METHEMOGLOBIN ARTERIAL: 0.8 %
MONOCYTES ABSOLUTE: 0.3 K/UL (ref 0–0.9)
MONOCYTES RELATIVE PERCENT: 5 % (ref 0–10)
MYCOPLASMA PNEUMONIAE BY PCR: NOT DETECTED
NEUTROPHILS ABSOLUTE: 4.1 K/UL (ref 1.5–7.5)
NEUTROPHILS RELATIVE PERCENT: 79.5 % (ref 50–65)
O2 CONTENT ARTERIAL: 18 ML/DL
O2 SAT, ARTERIAL: 90.3 %
O2 THERAPY: ABNORMAL
PARAINFLUENZA VIRUS 1 BY PCR: NOT DETECTED
PARAINFLUENZA VIRUS 2 BY PCR: NOT DETECTED
PARAINFLUENZA VIRUS 3 BY PCR: NOT DETECTED
PARAINFLUENZA VIRUS 4 BY PCR: NOT DETECTED
PCO2 ARTERIAL: 49 MMHG (ref 35–45)
PDW BLD-RTO: 13.1 % (ref 11.5–14.5)
PERFORMED ON: ABNORMAL
PH ARTERIAL: 7.4 (ref 7.35–7.45)
PLATELET # BLD: 215 K/UL (ref 130–400)
PMV BLD AUTO: 11 FL (ref 9.4–12.4)
PO2 ARTERIAL: 66 MMHG (ref 80–100)
POTASSIUM SERPL-SCNC: 3.9 MMOL/L (ref 3.5–5)
POTASSIUM, WHOLE BLOOD: 3.5
PRO-BNP: 3526 PG/ML (ref 0–1800)
RBC # BLD: 4.98 M/UL (ref 4.7–6.1)
RESPIRATORY SYNCYTIAL VIRUS BY PCR: NOT DETECTED
SARS-COV-2, PCR: NOT DETECTED
SODIUM BLD-SCNC: 133 MMOL/L (ref 136–145)
TOTAL PROTEIN: 6.3 G/DL (ref 6.6–8.7)
TROPONIN: 1.07 NG/ML (ref 0–0.03)
WBC # BLD: 5.2 K/UL (ref 4.8–10.8)

## 2021-02-12 PROCEDURE — 82803 BLOOD GASES ANY COMBINATION: CPT

## 2021-02-12 PROCEDURE — 82947 ASSAY GLUCOSE BLOOD QUANT: CPT

## 2021-02-12 PROCEDURE — 84484 ASSAY OF TROPONIN QUANT: CPT

## 2021-02-12 PROCEDURE — 36415 COLL VENOUS BLD VENIPUNCTURE: CPT

## 2021-02-12 PROCEDURE — 96365 THER/PROPH/DIAG IV INF INIT: CPT

## 2021-02-12 PROCEDURE — 6370000000 HC RX 637 (ALT 250 FOR IP): Performed by: INTERNAL MEDICINE

## 2021-02-12 PROCEDURE — 99284 EMERGENCY DEPT VISIT MOD MDM: CPT

## 2021-02-12 PROCEDURE — 87040 BLOOD CULTURE FOR BACTERIA: CPT

## 2021-02-12 PROCEDURE — 36600 WITHDRAWAL OF ARTERIAL BLOOD: CPT

## 2021-02-12 PROCEDURE — 2580000003 HC RX 258: Performed by: INTERNAL MEDICINE

## 2021-02-12 PROCEDURE — 80053 COMPREHEN METABOLIC PANEL: CPT

## 2021-02-12 PROCEDURE — 2140000000 HC CCU INTERMEDIATE R&B

## 2021-02-12 PROCEDURE — 83605 ASSAY OF LACTIC ACID: CPT

## 2021-02-12 PROCEDURE — 87150 DNA/RNA AMPLIFIED PROBE: CPT

## 2021-02-12 PROCEDURE — 6360000002 HC RX W HCPCS: Performed by: INTERNAL MEDICINE

## 2021-02-12 PROCEDURE — 6360000002 HC RX W HCPCS: Performed by: EMERGENCY MEDICINE

## 2021-02-12 PROCEDURE — 94640 AIRWAY INHALATION TREATMENT: CPT

## 2021-02-12 PROCEDURE — 0202U NFCT DS 22 TRGT SARS-COV-2: CPT

## 2021-02-12 PROCEDURE — 2700000000 HC OXYGEN THERAPY PER DAY

## 2021-02-12 PROCEDURE — 93005 ELECTROCARDIOGRAM TRACING: CPT

## 2021-02-12 PROCEDURE — 83880 ASSAY OF NATRIURETIC PEPTIDE: CPT

## 2021-02-12 PROCEDURE — 6370000000 HC RX 637 (ALT 250 FOR IP): Performed by: EMERGENCY MEDICINE

## 2021-02-12 PROCEDURE — 84132 ASSAY OF SERUM POTASSIUM: CPT

## 2021-02-12 PROCEDURE — 85025 COMPLETE CBC W/AUTO DIFF WBC: CPT

## 2021-02-12 PROCEDURE — 87077 CULTURE AEROBIC IDENTIFY: CPT

## 2021-02-12 PROCEDURE — 96375 TX/PRO/DX INJ NEW DRUG ADDON: CPT

## 2021-02-12 PROCEDURE — 87186 SC STD MICRODIL/AGAR DIL: CPT

## 2021-02-12 PROCEDURE — 71045 X-RAY EXAM CHEST 1 VIEW: CPT

## 2021-02-12 PROCEDURE — 2580000003 HC RX 258: Performed by: EMERGENCY MEDICINE

## 2021-02-12 RX ORDER — ACETAMINOPHEN 650 MG/1
650 SUPPOSITORY RECTAL EVERY 6 HOURS PRN
Status: DISCONTINUED | OUTPATIENT
Start: 2021-02-12 | End: 2021-02-24 | Stop reason: HOSPADM

## 2021-02-12 RX ORDER — TAMSULOSIN HYDROCHLORIDE 0.4 MG/1
0.4 CAPSULE ORAL DAILY
Status: DISCONTINUED | OUTPATIENT
Start: 2021-02-12 | End: 2021-02-24 | Stop reason: HOSPADM

## 2021-02-12 RX ORDER — NICOTINE POLACRILEX 4 MG
15 LOZENGE BUCCAL PRN
Status: DISCONTINUED | OUTPATIENT
Start: 2021-02-12 | End: 2021-02-24 | Stop reason: HOSPADM

## 2021-02-12 RX ORDER — ACETAMINOPHEN 325 MG/1
650 TABLET ORAL EVERY 6 HOURS PRN
Status: DISCONTINUED | OUTPATIENT
Start: 2021-02-12 | End: 2021-02-24 | Stop reason: HOSPADM

## 2021-02-12 RX ORDER — NIFEDIPINE 60 MG/1
60 TABLET, EXTENDED RELEASE ORAL DAILY
Status: DISCONTINUED | OUTPATIENT
Start: 2021-02-12 | End: 2021-02-24 | Stop reason: HOSPADM

## 2021-02-12 RX ORDER — DEXTROSE MONOHYDRATE 50 MG/ML
100 INJECTION, SOLUTION INTRAVENOUS PRN
Status: DISCONTINUED | OUTPATIENT
Start: 2021-02-12 | End: 2021-02-24 | Stop reason: HOSPADM

## 2021-02-12 RX ORDER — TROSPIUM CHLORIDE 20 MG/1
20 TABLET, FILM COATED ORAL NIGHTLY
Status: DISCONTINUED | OUTPATIENT
Start: 2021-02-12 | End: 2021-02-24 | Stop reason: HOSPADM

## 2021-02-12 RX ORDER — ASPIRIN 325 MG
325 TABLET ORAL ONCE
Status: COMPLETED | OUTPATIENT
Start: 2021-02-12 | End: 2021-02-12

## 2021-02-12 RX ORDER — ATORVASTATIN CALCIUM 20 MG/1
20 TABLET, FILM COATED ORAL DAILY
Status: DISCONTINUED | OUTPATIENT
Start: 2021-02-12 | End: 2021-02-24 | Stop reason: HOSPADM

## 2021-02-12 RX ORDER — SODIUM CHLORIDE 0.9 % (FLUSH) 0.9 %
10 SYRINGE (ML) INJECTION PRN
Status: DISCONTINUED | OUTPATIENT
Start: 2021-02-12 | End: 2021-02-24 | Stop reason: HOSPADM

## 2021-02-12 RX ORDER — METHYLPREDNISOLONE SODIUM SUCCINATE 40 MG/ML
40 INJECTION, POWDER, LYOPHILIZED, FOR SOLUTION INTRAMUSCULAR; INTRAVENOUS EVERY 8 HOURS
Status: DISCONTINUED | OUTPATIENT
Start: 2021-02-12 | End: 2021-02-14

## 2021-02-12 RX ORDER — DEXTROSE MONOHYDRATE 25 G/50ML
12.5 INJECTION, SOLUTION INTRAVENOUS PRN
Status: DISCONTINUED | OUTPATIENT
Start: 2021-02-12 | End: 2021-02-13 | Stop reason: SDUPTHER

## 2021-02-12 RX ORDER — PANTOPRAZOLE SODIUM 40 MG/1
40 TABLET, DELAYED RELEASE ORAL DAILY
Status: DISCONTINUED | OUTPATIENT
Start: 2021-02-12 | End: 2021-02-24 | Stop reason: HOSPADM

## 2021-02-12 RX ORDER — SODIUM CHLORIDE 0.9 % (FLUSH) 0.9 %
10 SYRINGE (ML) INJECTION EVERY 12 HOURS SCHEDULED
Status: DISCONTINUED | OUTPATIENT
Start: 2021-02-12 | End: 2021-02-24 | Stop reason: HOSPADM

## 2021-02-12 RX ORDER — SODIUM CHLORIDE, SODIUM LACTATE, POTASSIUM CHLORIDE, CALCIUM CHLORIDE 600; 310; 30; 20 MG/100ML; MG/100ML; MG/100ML; MG/100ML
INJECTION, SOLUTION INTRAVENOUS CONTINUOUS
Status: DISCONTINUED | OUTPATIENT
Start: 2021-02-12 | End: 2021-02-13

## 2021-02-12 RX ORDER — METOPROLOL SUCCINATE 50 MG/1
100 TABLET, EXTENDED RELEASE ORAL DAILY
Status: DISCONTINUED | OUTPATIENT
Start: 2021-02-12 | End: 2021-02-24 | Stop reason: HOSPADM

## 2021-02-12 RX ORDER — 0.9 % SODIUM CHLORIDE 0.9 %
1000 INTRAVENOUS SOLUTION INTRAVENOUS ONCE
Status: COMPLETED | OUTPATIENT
Start: 2021-02-12 | End: 2021-02-12

## 2021-02-12 RX ORDER — DEXTROSE MONOHYDRATE 50 MG/ML
100 INJECTION, SOLUTION INTRAVENOUS PRN
Status: DISCONTINUED | OUTPATIENT
Start: 2021-02-12 | End: 2021-02-13 | Stop reason: SDUPTHER

## 2021-02-12 RX ORDER — IPRATROPIUM BROMIDE AND ALBUTEROL SULFATE 2.5; .5 MG/3ML; MG/3ML
1 SOLUTION RESPIRATORY (INHALATION) EVERY 4 HOURS
Status: DISCONTINUED | OUTPATIENT
Start: 2021-02-12 | End: 2021-02-24 | Stop reason: HOSPADM

## 2021-02-12 RX ORDER — POLYETHYLENE GLYCOL 3350 17 G/17G
17 POWDER, FOR SOLUTION ORAL DAILY PRN
Status: DISCONTINUED | OUTPATIENT
Start: 2021-02-12 | End: 2021-02-24 | Stop reason: HOSPADM

## 2021-02-12 RX ORDER — ASPIRIN 81 MG/1
81 TABLET ORAL DAILY
Status: DISCONTINUED | OUTPATIENT
Start: 2021-02-13 | End: 2021-02-24 | Stop reason: HOSPADM

## 2021-02-12 RX ORDER — DEXTROSE MONOHYDRATE 25 G/50ML
12.5 INJECTION, SOLUTION INTRAVENOUS PRN
Status: DISCONTINUED | OUTPATIENT
Start: 2021-02-12 | End: 2021-02-24 | Stop reason: HOSPADM

## 2021-02-12 RX ORDER — NICOTINE POLACRILEX 4 MG
15 LOZENGE BUCCAL PRN
Status: DISCONTINUED | OUTPATIENT
Start: 2021-02-12 | End: 2021-02-13 | Stop reason: SDUPTHER

## 2021-02-12 RX ADMIN — IPRATROPIUM BROMIDE AND ALBUTEROL SULFATE 1 AMPULE: 2.5; .5 SOLUTION RESPIRATORY (INHALATION) at 22:41

## 2021-02-12 RX ADMIN — ASPIRIN 325 MG: 325 TABLET, FILM COATED ORAL at 16:50

## 2021-02-12 RX ADMIN — ATORVASTATIN CALCIUM 20 MG: 20 TABLET, FILM COATED ORAL at 21:20

## 2021-02-12 RX ADMIN — Medication 1000 MG: at 16:52

## 2021-02-12 RX ADMIN — SODIUM CHLORIDE, PRESERVATIVE FREE 10 ML: 5 INJECTION INTRAVENOUS at 20:31

## 2021-02-12 RX ADMIN — Medication 500 MG: at 18:40

## 2021-02-12 RX ADMIN — SODIUM CHLORIDE, POTASSIUM CHLORIDE, SODIUM LACTATE AND CALCIUM CHLORIDE: 600; 310; 30; 20 INJECTION, SOLUTION INTRAVENOUS at 20:31

## 2021-02-12 RX ADMIN — CEFEPIME 2000 MG: 2 INJECTION, POWDER, FOR SOLUTION INTRAVENOUS at 16:51

## 2021-02-12 RX ADMIN — METOPROLOL SUCCINATE 100 MG: 50 TABLET, EXTENDED RELEASE ORAL at 21:20

## 2021-02-12 RX ADMIN — INSULIN LISPRO 1 UNITS: 100 INJECTION, SOLUTION INTRAVENOUS; SUBCUTANEOUS at 21:19

## 2021-02-12 RX ADMIN — NIFEDIPINE 60 MG: 60 TABLET, FILM COATED, EXTENDED RELEASE ORAL at 21:20

## 2021-02-12 RX ADMIN — ENOXAPARIN SODIUM 40 MG: 40 INJECTION SUBCUTANEOUS at 21:20

## 2021-02-12 RX ADMIN — METHYLPREDNISOLONE SODIUM SUCCINATE 40 MG: 40 INJECTION, POWDER, FOR SOLUTION INTRAMUSCULAR; INTRAVENOUS at 21:19

## 2021-02-12 RX ADMIN — SODIUM CHLORIDE 1000 ML: 9 INJECTION, SOLUTION INTRAVENOUS at 16:16

## 2021-02-12 ASSESSMENT — ENCOUNTER SYMPTOMS
NAUSEA: 0
COUGH: 1
VOMITING: 0
DIARRHEA: 0
BACK PAIN: 0
RHINORRHEA: 0
SHORTNESS OF BREATH: 1
ABDOMINAL PAIN: 0
SORE THROAT: 0

## 2021-02-12 ASSESSMENT — PAIN SCALES - GENERAL: PAINLEVEL_OUTOF10: 0

## 2021-02-12 NOTE — TELEPHONE ENCOUNTER
[facility] called and stated that the patients wife reported that he is falling asleep while talking and his temperature today has been around 102 and she gave him tylenol and checked his O2 and it was 78. He did some breathing exercises and it only went up to 82. He does not have O2 in the home. I told her that he needed to go straight to the ED. She gave a verbal understanding and said she would contact the patients wife.

## 2021-02-12 NOTE — Clinical Note
Patient Class: Inpatient [101]   REQUIRED: Diagnosis: Sepsis ([facility] [address] [[id number]]   Estimated Length of Stay: Estimated stay of more than 2 midnights   Admitting Provider: Cristy Mccartney [[id number]]   Telemetry Bed Required?: Yes

## 2021-02-12 NOTE — ED PROVIDER NOTES
[facility] CARE  eMERGENCY dEPARTMENT eNCOUnter      Pt Name: Ashley Christina  MRN: [id number]  [place] 1940  Date of evaluation: 2/12/2021  Provider: Kendy Underwood MD    [address] [place] [address]       Chief Complaint   Patient presents with    Fever    Shortness of Breath         HISTORY OF PRESENT ILLNESS   (Location/Symptom, Timing/Onset,Context/Setting, Quality, Duration, Modifying Factors, Severity)  Note limiting factors. Ashley Christina is a 79 y.o. male who presents to the emergency department for progressive generalized weakness and fatigue as well as increasing shortness of breath over the past week or so. Patient was hospitalized from January 17-21 and required intubation in the emergency department for acute respiratory failure secondary to bilateral pneumonia. He was Covid negative. Patient was given vancomycin and Zosyn during his 4-day hospitalization and he was transitioned to inpatient rehab. He states he has been home only a few days but had continuous decline. He is a former smoker and has known COPD but does not wear oxygen at baseline. Denies any chest pain or leg swelling. Appears he was discharged on Omnicef and azithromycin however he is unsure if he is on any antibiotics as an outpatient. Arrives in no obvious respiratory distress but room air saturation was 79%. HPI    NursingNotes were reviewed. REVIEW OF SYSTEMS    (2-9 systems for level 4, 10 or more for level 5)     Review of Systems   Constitutional: Positive for activity change and fatigue. Negative for chills and fever. HENT: Negative for rhinorrhea and sore throat. Respiratory: Positive for cough and shortness of breath. Cardiovascular: Negative for chest pain and leg swelling. Gastrointestinal: Negative for abdominal pain, diarrhea, nausea and vomiting. Genitourinary: Negative for dysuria and frequency. Musculoskeletal: Negative for back pain and neck pain.    Neurological: Negative for dizziness and headaches. All other systems reviewed and are negative. PAST MEDICALHISTORY     Past Medical History:   Diagnosis Date    Acute liver failure without hepatic coma 10/23/2018    Back pain     \"with tired legs as a result\"    Bladder cancer ([facility] [address] 12/19/2018    Blood circulation, collateral     Carotid arterial disease (HCC)     recent surgery    CKD (chronic kidney disease), 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/2018    GERD (gastroesophageal reflux disease)     Hyperlipidemia     Hypertension     Hypertension     Palliative care patient 10/23/2018    Pneumonia due to infectious organism 11/06/2018    Primary osteoarthritis of left knee 10/14/2018    PVD (peripheral vascular disease) (HCC)     Tremor     Tremor on Right side x 1-2 weeks per stepdaughter    Type 2 diabetes mellitus with complication, without long-term current use of insulin ([facility] [address] 1/21/2021         SURGICAL HISTORY       Past Surgical History:   Procedure Laterality Date    BACK SURGERY      COLONOSCOPY  2007?  CYSTOSCOPY Bilateral 12/19/2018    CYSTOSCOPY, BIOSPY FULGURATION OF BLADDER TUMOR POSSIBLE TURBT, RETROGRADE PYELOGRAM performed by Ehsan Govea MD at [facility] COLONOSCOPY FLX DX W/COLLJ SPEC WHEN PFRMD N/A 9/11/2017    Dr Ximena Aaron internal hemorrhoids, diverticular disease-[facility]-No recall (age)   Collette Satchel [place] REVISE MEDIAN N/CARPAL TUNNEL SURG Left 7/18/2018    OPEN CARPAL TUNNEL RELEASE performed by Deja Luna MD at [address] Left 8/28/2018    LEFT CAROTID ENDARTERECTOMY WITH VEIN PATCH ANGIOPLASTY AND COMPLETION ANGIOGRAM performed by Ashley Duarte MD at [address] 10/15/2018    LEFT COMPLEX TOTAL KNEE ARTHROPLASTY performed by Deja Luna MD at [facility] VASCULAR SURGERY  04/21/2015    Madelyn BESS Ultrasound guided access of left common femoral artery. Aortogram.Diagnostic right lower extremity arteriogram.Radiologic supervision and interpretation.  VASCULAR SURGERY  01/13/2015    Radha Bruno M.D Atherectomy,angioplasty,and stenting of left superficial femoral artery.  VASCULAR SURGERY  03/11/2014    Radha Bruno M.D. Ultrasound-guided access of right common femoral artery. Aortogram.Left lower extremity arteriogram.Atherectomy and angioplasty of left superficial femoral artery. Radiologic supervision and interpretation.  VASCULAR SURGERY  01/18/2013    Radha BESS Aortogram.Multistation arteriogram right lower extremity. Laser atherectomy and angioplasty of right superficial femoral artery. Selective catheterization of right tibioperoneal trunk. Angioplasty of peroneal artery and tibioperoneal trunk.  VASCULAR SURGERY  10/30/2018    S. Ultrasound guided cannulation of right internal vein. Placement of right internal jugular vein tunneled dialysis catheter bard equistream xk 23cm tip to cuff    VASCULAR SURGERY  12/17/2018    S. Removal of tunneled dilaysis catheter right internal jugular vein.          CURRENT MEDICATIONS     Current Discharge Medication List      CONTINUE these medications which have NOT CHANGED    Details   aspirin 81 MG EC tablet Take 1 tablet by mouth daily  Qty: 30 tablet, Refills: 0      glipiZIDE (GLUCOTROL) 5 MG tablet Take 1 tablet by mouth every morning (before breakfast)  Qty: 60 tablet, Refills: 0      atorvastatin (LIPITOR) 20 MG tablet Take 1 tablet by mouth daily  Qty: 30 tablet, Refills: 0      metoprolol succinate (TOPROL XL) 100 MG extended release tablet Take 1 tablet by mouth daily  Qty: 30 tablet, Refills: 0      NIFEdipine (ADALAT CC) 60 MG extended release tablet Take 1 tablet by mouth daily  Qty: 30 tablet, Refills: 0      tamsulosin (FLOMAX) 0.4 MG capsule Take 1 capsule by mouth daily  Qty: 30 capsule, Refills: 0      pantoprazole (PROTONIX) 40 MG tablet Take 1 tablet by mouth daily  Qty: 30 tablet, Refills: 0      trospium (SANCTURA) 20 MG tablet Take 1 tablet by mouth nightly  Qty: 30 tablet, Refills: 0             ALLERGIES     Eliquis [apixaban] and Promethazine hcl    FAMILY HISTORY       Family History   Problem Relation Age of Onset    Colon Cancer Father     Diabetes Brother     Colon Polyps Neg Hx     Liver Cancer Neg Hx     Liver Disease Neg Hx     Esophageal Cancer Neg Hx     Rectal Cancer Neg Hx     Stomach Cancer Neg Hx           SOCIAL HISTORY       Social History     Socioeconomic History    Marital status:      Spouse name: None    Number of children: None    Years of education: None    Highest education level: None   Occupational History    None   Social Needs    Financial resource strain: None    Food insecurity     Worry: None     Inability: None    Transportation needs     Medical: None     Non-medical: None   Tobacco Use    Smoking status: Former Smoker     Quit date: 6/3/2003     Years since quittin.7    Smokeless tobacco: Never Used   Substance and Sexual Activity    Alcohol use:  Yes     Alcohol/week: 12.0 standard drinks     Types: 12 Glasses of wine per week     Comment: 2 glasses of wine every night    Drug use: No    Sexual activity: Yes     Partners: Female   Lifestyle    Physical activity     Days per week: None     Minutes per session: None    Stress: None   Relationships    Social connections     Talks on phone: None     Gets together: None     Attends Zoroastrianism service: None     Active member of club or organization: None     Attends meetings of clubs or organizations: None     Relationship status: None    Intimate partner violence     Fear of current or ex partner: None     Emotionally abused: None     Physically abused: None     Forced sexual activity: None   Other Topics Concern    None   Social History Narrative    None       SCREENINGS    Dago Coma Scale  Eye Opening: Spontaneous  Best Verbal Response: Oriented  Best Motor Response: Obeys commands  Dago Coma Scale Score: 15        PHYSICAL EXAM    (up to 7 for level 4, 8 or more for level 5)     ED Triage Vitals [02/12/21 1531]   BP Temp Temp Source Pulse Resp SpO2 Height Weight   112/61 98.5 °F (36.9 °C) Tympanic 74 28 (!) 88 % 6' (1.829 m) 235 lb (106.6 kg)       Physical Exam  Vitals signs and nursing note reviewed. Constitutional:       General: He is not in acute distress. Appearance: He is well-developed. He is ill-appearing. He is not diaphoretic. HENT:      Head: Normocephalic and atraumatic. Eyes:      Conjunctiva/sclera: Conjunctivae normal.   Neck:      Musculoskeletal: Normal range of motion and neck supple. Trachea: No tracheal deviation. Cardiovascular:      Rate and Rhythm: Normal rate and regular rhythm. Heart sounds: Normal heart sounds. No murmur. Pulmonary:      Effort: Tachypnea present. No respiratory distress. Breath sounds: Examination of the right-middle field reveals decreased breath sounds. Examination of the left-middle field reveals decreased breath sounds. Examination of the right-lower field reveals decreased breath sounds. Examination of the left-lower field reveals decreased breath sounds. Decreased breath sounds present. No wheezing or rales. Abdominal:      Palpations: Abdomen is soft. There is no mass. Tenderness: There is no abdominal tenderness. Musculoskeletal: Normal range of motion. Right lower leg: No edema. Left lower leg: No edema. Skin:     General: Skin is warm and dry. Neurological:      Mental Status: He is alert and oriented to person, place, and time.          DIAGNOSTIC RESULTS     EKG: All EKG's areinterpreted by the Emergency Department Physician who either signs or Co-signs this chart in the absence of a cardiologist.      72 normal sinus rhythm, no obvious ST changes nondiagnostic EKG      RADIOLOGY:  Non-plain film images such as CT, Ultrasound and MRI are read by the radiologist. Plain radiographic images are visualized and preliminarily interpreted bythe emergency physician with the below findings:      XR CHEST PORTABLE   Final Result   Bilateral pneumonia, though worse when compared to the   prior exam. Follow-up PA and lateral chest radiograph to recommended   in 6-8 weeks to document resolution.    Signed by Dr Joan Ayala on 2/12/2021 4:14 PM              LABS:  Labs Reviewed   CBC WITH AUTO DIFFERENTIAL - Abnormal; Notable for the following components:       Result Value    Hemoglobin 13.9 (*)     MCHC 31.8 (*)     Neutrophils % 79.5 (*)     Lymphocytes % 12.0 (*)     Lymphocytes Absolute 0.6 (*)     All other components within normal limits   COMPREHENSIVE METABOLIC PANEL - Abnormal; Notable for the following components:    Sodium 133 (*)     Chloride 93 (*)     Glucose 251 (*)     BUN 49 (*)     CREATININE 2.2 (*)     GFR Non- 29 (*)     GFR  35 (*)     Total Protein 6.3 (*)     AST 47 (*)     All other components within normal limits   BLOOD GAS, ARTERIAL - Abnormal; Notable for the following components:    pCO2, Arterial 49.0 (*)     pO2, Arterial 66.0 (*)     HCO3, Arterial 30.4 (*)     Base Excess, Arterial 4.5 (*)     All other components within normal limits   LACTIC ACID, PLASMA - Abnormal; Notable for the following components:    Lactic Acid 2.0 (*)     All other components within normal limits    Narrative:     CALL  [place]  [facility] tel. ,  Chemistry results called to and read back by Lo Gomez RN/ER, 02/12/2021 16:20,  by [facility]   TROPONIN - Abnormal; Notable for the following components:    Troponin 1.07 (*)     All other components within normal limits    Narrative:     CALL  [place]  [facility] tel. ,  Chemistry results called to and read back by [facility] RN/ER, 02/12/2021 16:23,  by Brayden Campbell - Abnormal; Notable for the following components:    Pro-BNP 3,526 (*)     All other components within normal limits   POCT GLUCOSE - Abnormal; Notable for the following components: POC Glucose 217 (*)     All other components within normal limits   RESPIRATORY PANEL, MOLECULAR, WITH COVID-19   CULTURE, BLOOD 1   CULTURE, BLOOD 2   POTASSIUM, WHOLE BLOOD   URINE RT REFLEX TO CULTURE   BASIC METABOLIC PANEL   CBC WITH AUTO DIFFERENTIAL   MAGNESIUM   VANCOMYCIN, RANDOM   POCT GLUCOSE   POCT GLUCOSE   POCT GLUCOSE   POCT GLUCOSE   POCT GLUCOSE       All other labs were within normal range or not returned as of this dictation. EMERGENCY DEPARTMENT COURSE and DIFFERENTIAL DIAGNOSIS/MDM:   Vitals:    Vitals:    02/12/21 1932 02/12/21 1939 02/12/21 2028 02/12/21 2047   BP: 105/65  110/66    Pulse: 63  68 76   Resp: 26  22    Temp:   98.1 °F (36.7 °C)    TempSrc:   Temporal    SpO2: 90% (!) 89% (!) 87%    Weight:       Height:           MDM  Number of Diagnoses or Management Options     Amount and/or Complexity of Data Reviewed  Clinical lab tests: ordered and reviewed  Tests in the radiology section of CPT®: ordered and reviewed  Discuss the patient with other providers: yes  Independent visualization of images, tracings, or specimens: yes      Vitals stable on arrival except for hypoxia, 02 sat of 79% on RA on arrival, stable on 3L n/c doesn't wear 02 at baseline, recently admitted for similar and was intubated in ER, now with worsening pneumonia, resp panel neg, covered with vanc/cefepime, trop elevated but has been in past, denies any cp, ekg ok, suspect possibly due to underlying illness, sangeeta as well could be contributing, d/w Dr. Elgin Motta for admission    CRITICAL CARE TIME   Total Critical Care time was 40 minutes, excluding separately reportable procedures. There was a high probability of clinically significant/life threatening deterioration in the patient's condition which required my urgent intervention.   Immediate eval, hypoxia, fluid bolus, antibx, reassessments, d/w admitting MD        CONSULTS:  [address]:  Unless otherwise noted below, none     Procedures FINAL IMPRESSION      1. Sepsis, due to unspecified organism, unspecified whether acute organ dysfunction present ([facility] [address]    2. Acute respiratory failure with hypoxia (HCC)    3. Pneumonia of both lungs due to infectious organism, unspecified part of lung    4. Elevated troponin    5. ANA (acute kidney injury) ([facility] [address]          DISPOSITION/PLAN   DISPOSITION        PATIENT REFERRED TO:  No follow-up provider specified.     DISCHARGE MEDICATIONS:  Current Discharge Medication List             (Please note that portions of this note were completed with a voice recognition program.  Efforts were made to edit thedictations but occasionally words are mis-transcribed.)    Estefany Alvarez MD (electronically signed)  Attending Emergency Physician        Ger Perkins MD  02/12/21 1599

## 2021-02-12 NOTE — PROGRESS NOTES
Contains abnormal data Blood Gas, Arterial  Status:  Final result   Ref Range & Units 02/12/21 1609   pH, Arterial 7.350 - 7.450 7.400    pCO2, Arterial 35.0 - 45.0 mmHg 49. 0High     pO2, Arterial 80.0 - 100.0 mmHg 66. 0Low     HCO3, Arterial 22.0 - 26.0 mmol/L 30.4High     Base Excess, Arterial -2.0 - 2.0 mmol/L 4.5High     Hemoglobin, Art, Extended 14.0 - 18.0 g/dL 14.2    O2 Sat, Arterial >92 % 90.3    Carboxyhgb, Arterial 0.0 - 5.0 % 1.9    Comment:      0.0-1.5   (Smokers 1.5-5.0)    Methemoglobin, Arterial <1.5 % 0.8    O2 Content, Arterial Not Established mL/dL 18.0    O2 Therapy  Unknown    Resulting Agency  [facility]        Specimen Collected: 02/12/21 1609 Last Resulted: 02/12/21 1610 View Other Order Details        Pt on 2 lpm RR 25 site RR at+

## 2021-02-12 NOTE — ED NOTES
Patient placed on cardiac monitor, continuous pulse oximeter, and NIBP monitor. Monitor alarms on.        Howard Luther RN  02/12/21 3983

## 2021-02-12 NOTE — ED NOTES
Pt states he was recently admitted for pneumonia. He states after 2 days of being home he started to get really short of breath again. He states he has been running a fever and took Tylenol before coming to the ER.       Haydee Hummel  02/12/21 1537

## 2021-02-13 ENCOUNTER — APPOINTMENT (OUTPATIENT)
Dept: CT IMAGING | Age: 81
DRG: 871 | End: 2021-02-13
Payer: MEDICARE

## 2021-02-13 ENCOUNTER — APPOINTMENT (OUTPATIENT)
Dept: ULTRASOUND IMAGING | Age: 81
DRG: 871 | End: 2021-02-13
Payer: MEDICARE

## 2021-02-13 LAB
ACINETOBACTER BAUMANNII BY PCR: NOT DETECTED
ANION GAP SERPL CALCULATED.3IONS-SCNC: 12 MMOL/L (ref 7–19)
BASOPHILS ABSOLUTE: 0 K/UL (ref 0–0.2)
BASOPHILS RELATIVE PERCENT: 0.5 % (ref 0–1)
BUN BLDV-MCNC: 50 MG/DL (ref 8–23)
CALCIUM SERPL-MCNC: 8.1 MG/DL (ref 8.8–10.2)
CANDIDA ALBICANS BY PCR: NOT DETECTED
CANDIDA GLABRATA BY PCR: NOT DETECTED
CANDIDA KRUSEI BY PCR: NOT DETECTED
CANDIDA PARAPSILOSIS BY PCR: NOT DETECTED
CANDIDA TROPICALIS BY PCR: NOT DETECTED
CHLORIDE BLD-SCNC: 96 MMOL/L (ref 98–111)
CO2: 24 MMOL/L (ref 22–29)
CREAT SERPL-MCNC: 2.1 MG/DL (ref 0.5–1.2)
ENTEROBACTER CLOACAE COMPLEX BY PCR: NOT DETECTED
ENTEROBACTERALES BY PCR: NOT DETECTED
ENTEROCOCCUS BY PCR: NOT DETECTED
EOSINOPHILS ABSOLUTE: 0 K/UL (ref 0–0.6)
EOSINOPHILS RELATIVE PERCENT: 0 % (ref 0–5)
ESCHERICHIA COLI BY PCR: NOT DETECTED
GFR AFRICAN AMERICAN: 37
GFR NON-AFRICAN AMERICAN: 31
GLUCOSE BLD-MCNC: 205 MG/DL (ref 74–109)
GLUCOSE BLD-MCNC: 245 MG/DL (ref 70–99)
GLUCOSE BLD-MCNC: 249 MG/DL (ref 70–99)
GLUCOSE BLD-MCNC: 265 MG/DL (ref 70–99)
GLUCOSE BLD-MCNC: 268 MG/DL (ref 70–99)
HAEMOPHILUS INFLUENZAE BY PCR: NOT DETECTED
HBA1C MFR BLD: 6.4 % (ref 4–6)
HCT VFR BLD CALC: 41.9 % (ref 42–52)
HEMOGLOBIN: 13.2 G/DL (ref 14–18)
IMMATURE GRANULOCYTES #: 0.1 K/UL
KLEBSIELLA OXYTOCA BY PCR: NOT DETECTED
KLEBSIELLA PNEUMONIAE GROUP BY PCR: NOT DETECTED
L. PNEUMOPHILA SEROGP 1 UR AG: NORMAL
LISTERIA MONOCYTOGENES BY PCR: NOT DETECTED
LYMPHOCYTES ABSOLUTE: 0.4 K/UL (ref 1.1–4.5)
LYMPHOCYTES RELATIVE PERCENT: 8.9 % (ref 20–40)
MAGNESIUM: 1.6 MG/DL (ref 1.6–2.4)
MCH RBC QN AUTO: 28.1 PG (ref 27–31)
MCHC RBC AUTO-ENTMCNC: 31.5 G/DL (ref 33–37)
MCV RBC AUTO: 89.1 FL (ref 80–94)
METHICILLIN RESISTANCE MECA/C  BY PCR: DETECTED
MONOCYTES ABSOLUTE: 0.1 K/UL (ref 0–0.9)
MONOCYTES RELATIVE PERCENT: 2.6 % (ref 0–10)
NEISSERIA MENINGITIDIS BY PCR: NOT DETECTED
NEUTROPHILS ABSOLUTE: 3.6 K/UL (ref 1.5–7.5)
NEUTROPHILS RELATIVE PERCENT: 86.3 % (ref 50–65)
PDW BLD-RTO: 13.1 % (ref 11.5–14.5)
PERFORMED ON: ABNORMAL
PLATELET # BLD: 164 K/UL (ref 130–400)
PMV BLD AUTO: 11.1 FL (ref 9.4–12.4)
POTASSIUM SERPL-SCNC: 4.2 MMOL/L (ref 3.5–5)
PROTEUS SPECIES BY PCR: NOT DETECTED
PSEUDOMONAS AERUGINOSA BY PCR: NOT DETECTED
RBC # BLD: 4.7 M/UL (ref 4.7–6.1)
SERRATIA MARCESCENS BY PCR: NOT DETECTED
SODIUM BLD-SCNC: 132 MMOL/L (ref 136–145)
STAPHYLOCOCCUS AUREUS BY PCR: NOT DETECTED
STAPHYLOCOCCUS SPECIES BY PCR: DETECTED
STREPTOCOCCUS AGALACTIAE BY PCR: NOT DETECTED
STREPTOCOCCUS PNEUMONIAE BY PCR: NOT DETECTED
STREPTOCOCCUS PYOGENES  BY PCR: NOT DETECTED
STREPTOCOCCUS SPECIES BY PCR: NOT DETECTED
TROPONIN: 0.51 NG/ML (ref 0–0.03)
TROPONIN: 0.53 NG/ML (ref 0–0.03)
VANCOMYCIN RANDOM: 7.7 UG/ML
WBC # BLD: 4.2 K/UL (ref 4.8–10.8)

## 2021-02-13 PROCEDURE — 36415 COLL VENOUS BLD VENIPUNCTURE: CPT

## 2021-02-13 PROCEDURE — 87449 NOS EACH ORGANISM AG IA: CPT

## 2021-02-13 PROCEDURE — 80048 BASIC METABOLIC PNL TOTAL CA: CPT

## 2021-02-13 PROCEDURE — 2140000000 HC CCU INTERMEDIATE R&B

## 2021-02-13 PROCEDURE — 2580000003 HC RX 258: Performed by: INTERNAL MEDICINE

## 2021-02-13 PROCEDURE — 6360000002 HC RX W HCPCS: Performed by: EMERGENCY MEDICINE

## 2021-02-13 PROCEDURE — 94640 AIRWAY INHALATION TREATMENT: CPT

## 2021-02-13 PROCEDURE — 6370000000 HC RX 637 (ALT 250 FOR IP): Performed by: INTERNAL MEDICINE

## 2021-02-13 PROCEDURE — 87040 BLOOD CULTURE FOR BACTERIA: CPT

## 2021-02-13 PROCEDURE — 2580000003 HC RX 258: Performed by: EMERGENCY MEDICINE

## 2021-02-13 PROCEDURE — 80202 ASSAY OF VANCOMYCIN: CPT

## 2021-02-13 PROCEDURE — 99221 1ST HOSP IP/OBS SF/LOW 40: CPT | Performed by: INTERNAL MEDICINE

## 2021-02-13 PROCEDURE — 84484 ASSAY OF TROPONIN QUANT: CPT

## 2021-02-13 PROCEDURE — 6360000002 HC RX W HCPCS: Performed by: INTERNAL MEDICINE

## 2021-02-13 PROCEDURE — 71250 CT THORAX DX C-: CPT

## 2021-02-13 PROCEDURE — 83735 ASSAY OF MAGNESIUM: CPT

## 2021-02-13 PROCEDURE — 76770 US EXAM ABDO BACK WALL COMP: CPT

## 2021-02-13 PROCEDURE — 83036 HEMOGLOBIN GLYCOSYLATED A1C: CPT

## 2021-02-13 PROCEDURE — 2700000000 HC OXYGEN THERAPY PER DAY

## 2021-02-13 PROCEDURE — 86738 MYCOPLASMA ANTIBODY: CPT

## 2021-02-13 PROCEDURE — 2500000003 HC RX 250 WO HCPCS: Performed by: INTERNAL MEDICINE

## 2021-02-13 PROCEDURE — 82947 ASSAY GLUCOSE BLOOD QUANT: CPT

## 2021-02-13 PROCEDURE — 85025 COMPLETE CBC W/AUTO DIFF WBC: CPT

## 2021-02-13 RX ORDER — SODIUM CHLORIDE 9 MG/ML
INJECTION, SOLUTION INTRAVENOUS CONTINUOUS
Status: DISCONTINUED | OUTPATIENT
Start: 2021-02-13 | End: 2021-02-24 | Stop reason: HOSPADM

## 2021-02-13 RX ORDER — BUDESONIDE 0.5 MG/2ML
0.5 INHALANT ORAL 2 TIMES DAILY
Status: DISCONTINUED | OUTPATIENT
Start: 2021-02-13 | End: 2021-02-24 | Stop reason: HOSPADM

## 2021-02-13 RX ORDER — ARFORMOTEROL TARTRATE 15 UG/2ML
15 SOLUTION RESPIRATORY (INHALATION) 2 TIMES DAILY
Status: DISCONTINUED | OUTPATIENT
Start: 2021-02-13 | End: 2021-02-24 | Stop reason: HOSPADM

## 2021-02-13 RX ADMIN — IPRATROPIUM BROMIDE AND ALBUTEROL SULFATE 1 AMPULE: 2.5; .5 SOLUTION RESPIRATORY (INHALATION) at 14:05

## 2021-02-13 RX ADMIN — METHYLPREDNISOLONE SODIUM SUCCINATE 40 MG: 40 INJECTION, POWDER, FOR SOLUTION INTRAMUSCULAR; INTRAVENOUS at 20:25

## 2021-02-13 RX ADMIN — INSULIN LISPRO 3 UNITS: 100 INJECTION, SOLUTION INTRAVENOUS; SUBCUTANEOUS at 16:46

## 2021-02-13 RX ADMIN — SODIUM CHLORIDE: 9 INJECTION, SOLUTION INTRAVENOUS at 20:26

## 2021-02-13 RX ADMIN — METOPROLOL SUCCINATE 100 MG: 50 TABLET, EXTENDED RELEASE ORAL at 09:24

## 2021-02-13 RX ADMIN — INSULIN LISPRO 3 UNITS: 100 INJECTION, SOLUTION INTRAVENOUS; SUBCUTANEOUS at 20:25

## 2021-02-13 RX ADMIN — ARFORMOTEROL TARTRATE 15 MCG: 15 SOLUTION RESPIRATORY (INHALATION) at 18:41

## 2021-02-13 RX ADMIN — CEFEPIME HYDROCHLORIDE 2000 MG: 2 INJECTION, POWDER, FOR SOLUTION INTRAVENOUS at 05:46

## 2021-02-13 RX ADMIN — TROSPIUM CHLORIDE 20 MG: 20 TABLET, FILM COATED ORAL at 20:26

## 2021-02-13 RX ADMIN — BUDESONIDE 500 MCG: 0.5 INHALANT RESPIRATORY (INHALATION) at 18:41

## 2021-02-13 RX ADMIN — PANTOPRAZOLE SODIUM 40 MG: 40 TABLET, DELAYED RELEASE ORAL at 09:24

## 2021-02-13 RX ADMIN — IPRATROPIUM BROMIDE AND ALBUTEROL SULFATE 1 AMPULE: 2.5; .5 SOLUTION RESPIRATORY (INHALATION) at 18:41

## 2021-02-13 RX ADMIN — NIFEDIPINE 60 MG: 60 TABLET, FILM COATED, EXTENDED RELEASE ORAL at 09:24

## 2021-02-13 RX ADMIN — METHYLPREDNISOLONE SODIUM SUCCINATE 40 MG: 40 INJECTION, POWDER, FOR SOLUTION INTRAMUSCULAR; INTRAVENOUS at 05:35

## 2021-02-13 RX ADMIN — VANCOMYCIN HYDROCHLORIDE 1500 MG: 10 INJECTION, POWDER, LYOPHILIZED, FOR SOLUTION INTRAVENOUS at 05:35

## 2021-02-13 RX ADMIN — METHYLPREDNISOLONE SODIUM SUCCINATE 40 MG: 40 INJECTION, POWDER, FOR SOLUTION INTRAMUSCULAR; INTRAVENOUS at 12:58

## 2021-02-13 RX ADMIN — IPRATROPIUM BROMIDE AND ALBUTEROL SULFATE 1 AMPULE: 2.5; .5 SOLUTION RESPIRATORY (INHALATION) at 06:42

## 2021-02-13 RX ADMIN — TAMSULOSIN HYDROCHLORIDE 0.4 MG: 0.4 CAPSULE ORAL at 09:24

## 2021-02-13 RX ADMIN — CEFEPIME HYDROCHLORIDE 2000 MG: 2 INJECTION, POWDER, FOR SOLUTION INTRAVENOUS at 16:46

## 2021-02-13 RX ADMIN — SODIUM CHLORIDE, PRESERVATIVE FREE 10 ML: 5 INJECTION INTRAVENOUS at 09:25

## 2021-02-13 RX ADMIN — DOXYCYCLINE 100 MG: 100 INJECTION, POWDER, LYOPHILIZED, FOR SOLUTION INTRAVENOUS at 12:58

## 2021-02-13 RX ADMIN — INSULIN LISPRO 2 UNITS: 100 INJECTION, SOLUTION INTRAVENOUS; SUBCUTANEOUS at 09:30

## 2021-02-13 RX ADMIN — ATORVASTATIN CALCIUM 20 MG: 20 TABLET, FILM COATED ORAL at 09:24

## 2021-02-13 RX ADMIN — SODIUM CHLORIDE, PRESERVATIVE FREE 10 ML: 5 INJECTION INTRAVENOUS at 20:26

## 2021-02-13 RX ADMIN — IPRATROPIUM BROMIDE AND ALBUTEROL SULFATE 1 AMPULE: 2.5; .5 SOLUTION RESPIRATORY (INHALATION) at 10:29

## 2021-02-13 RX ADMIN — ARFORMOTEROL TARTRATE 15 MCG: 15 SOLUTION RESPIRATORY (INHALATION) at 10:28

## 2021-02-13 RX ADMIN — DOXYCYCLINE 100 MG: 100 INJECTION, POWDER, LYOPHILIZED, FOR SOLUTION INTRAVENOUS at 01:34

## 2021-02-13 RX ADMIN — IPRATROPIUM BROMIDE AND ALBUTEROL SULFATE 1 AMPULE: 2.5; .5 SOLUTION RESPIRATORY (INHALATION) at 22:05

## 2021-02-13 RX ADMIN — ASPIRIN 81 MG: 81 TABLET, COATED ORAL at 09:24

## 2021-02-13 RX ADMIN — SODIUM CHLORIDE: 9 INJECTION, SOLUTION INTRAVENOUS at 12:58

## 2021-02-13 RX ADMIN — INSULIN LISPRO 3 UNITS: 100 INJECTION, SOLUTION INTRAVENOUS; SUBCUTANEOUS at 12:59

## 2021-02-13 RX ADMIN — ENOXAPARIN SODIUM 40 MG: 40 INJECTION SUBCUTANEOUS at 20:40

## 2021-02-13 RX ADMIN — BUDESONIDE 500 MCG: 0.5 INHALANT RESPIRATORY (INHALATION) at 10:29

## 2021-02-13 NOTE — PLAN OF CARE
Problem: Falls - Risk of:  Goal: Will remain free from falls  Description: Will remain free from falls  2/12/2021 2308 by Pamela Mckeon RN  Outcome: Ongoing  2/12/2021 2152 by Yoselin Woodard RN  Outcome: Ongoing  Goal: Absence of physical injury  Description: Absence of physical injury  2/12/2021 2308 by Pamela Dunlap RN  Outcome: Ongoing  2/12/2021 2152 by Yoselin Woodard RN  Outcome: Ongoing     Problem: Discharge Planning:  Goal: Discharged to appropriate level of care  Description: Discharged to appropriate level of care  Outcome: Ongoing     Problem: Gas Exchange - Impaired:  Goal: Levels of oxygenation will improve  Description: Levels of oxygenation will improve  Outcome: Ongoing     Problem: Infection, Septic Shock:  Goal: Will show no infection signs and symptoms  Description: Will show no infection signs and symptoms  Outcome: Ongoing     Problem: Infection - Ventilator-Associated Pneumonia:  Goal: Absence of pulmonary infection  Description: Absence of pulmonary infection  Outcome: Ongoing     Problem: Serum Glucose Level - Abnormal:  Goal: Ability to maintain appropriate glucose levels will improve  Description: Ability to maintain appropriate glucose levels will improve  Outcome: Ongoing     Problem: Tissue Perfusion, Altered:  Goal: Circulatory function within specified parameters  Description: Circulatory function within specified parameters  Outcome: Ongoing     Problem: Venous Thromboembolism:  Goal: Will show no signs or symptoms of venous thromboembolism  Description: Will show no signs or symptoms of venous thromboembolism  Outcome: Ongoing  Goal: Absence of signs or symptoms of impaired coagulation  Description: Absence of signs or symptoms of impaired coagulation  Outcome: Ongoing

## 2021-02-13 NOTE — PROGRESS NOTES
[facility]        Hospitalist Progress Note  2/13/2021 1:05 PM  Subjective:   Admit Date: 2/12/2021  PCP: Migdalia Pinedo MD    Chief Complaint: Dyspnea    Subjective: Patient seen and examined at bedside. Eating lunch. Currently saturating well on room air. Denies chest pain. Still short of breath. Cumulative Hospital History: 71-year-old obese male with a past medical history of COPD not on home oxygen, CKD stage III, diabetes, hypertension, BPH, h/o bladder CA recently admitted for bilateral pneumonia requiring intubation and mechanical intubation discharged to inpatient rehab (by me) with improvement and eventual discharge home. Patient presents back to the ED with worsening dyspnea found to have worsening bilateral pneumonia on CXR, poor PO intake and associated ANA on CKD and elevated troponin. Cardiology consulted for elevated troponin. ROS: 14 point review of systems is negative except as specifically addressed above. DIET CARB CONTROL;     Intake/Output Summary (Last 24 hours) at 2/13/2021 1305  Last data filed at 2/13/2021 2737  Gross per 24 hour   Intake 290 ml   Output 150 ml   Net 140 ml     Medications:   sodium chloride 100 mL/hr at 02/13/21 1258    dextrose       Current Facility-Administered Medications   Medication Dose Route Frequency Provider Last Rate Last Admin    budesonide (PULMICORT) nebulizer suspension 500 mcg  0.5 mg Nebulization BID Higinio Luu MD   500 mcg at 02/13/21 1029    Arformoterol Tartrate (BROVANA) nebulizer solution 15 mcg  15 mcg Nebulization BID Higinio Luu MD   15 mcg at 02/13/21 1028    0.9 % sodium chloride infusion   Intravenous Continuous Higinio Luu  mL/hr at 02/13/21 1258 New Bag at 02/13/21 1258    ceFEPIme (MAXIPIME) 2,000 mg in sterile water 20 mL IV syringe  2,000 mg Intravenous Q12H Talia Gambino MD   2,000 mg at 02/13/21 0546    vancomycin (VANCOCIN) intermittent dosing (placeholder)   Other RX [facility] Clair Rene MD        aspirin EC tablet 81 mg  81 mg Oral Daily Maryann Bazzi MD   81 mg at 02/13/21 3970    atorvastatin (LIPITOR) tablet 20 mg  20 mg Oral Daily Maryann Bazzi MD   20 mg at 02/13/21 7948    metoprolol succinate (TOPROL XL) extended release tablet 100 mg  100 mg Oral Daily Maryann Bazzi MD   100 mg at 02/13/21 1385    NIFEdipine (PROCARDIA XL) extended release tablet 60 mg  60 mg Oral Daily Maryann Bazzi MD   60 mg at 02/13/21 6330    pantoprazole (PROTONIX) tablet 40 mg  40 mg Oral Daily Maryann Bazzi MD   40 mg at 02/13/21 0924    tamsulosin (FLOMAX) capsule 0.4 mg  0.4 mg Oral Daily Maryann Bazzi MD   0.4 mg at 02/13/21 1463    trospium (SANCTURA) tablet 20 mg  20 mg Oral Nightly Maryann Bazzi MD        sodium chloride flush 0.9 % injection 10 mL  10 mL Intravenous 2 times per day Maryann Bazzi MD   10 mL at 02/13/21 0925    sodium chloride flush 0.9 % injection 10 mL  10 mL Intravenous PRN Maryann Bazzi MD        enoxaparin (LOVENOX) injection 40 mg  40 mg Subcutaneous Q24H Maryann Bazzi MD   40 mg at 02/12/21 2120    polyethylene glycol (GLYCOLAX) packet 17 g  17 g Oral Daily PRN Maryann Bazzi MD        acetaminophen (TYLENOL) tablet 650 mg  650 mg Oral Q6H PRN Maryann Bazzi MD        Or    acetaminophen (TYLENOL) suppository 650 mg  650 mg Rectal Q6H PRN Maryann Bazzi MD        insulin lispro (HUMALOG) injection vial 0-6 Units  0-6 Units Subcutaneous TID WC Maryann Bazzi MD   3 Units at 02/13/21 1259    insulin lispro (HUMALOG) injection vial 0-3 Units  0-3 Units Subcutaneous Nightly Maryann Bazzi MD   1 Units at 02/12/21 2119    methylPREDNISolone sodium (SOLU-MEDROL) injection 40 mg  40 mg Intravenous Q8H Maryann Bazzi MD   40 mg at 02/13/21 1258    ipratropium-albuterol (DUONEB) nebulizer solution 1 ampule  1 ampule Inhalation Q4H Maryann Bazzi MD   1 ampule at 02/13/21 1029    doxycycline (VIBRAMYCIN) 100 mg in dextrose 5 % 100 mL IVPB  100 mg Intravenous Q12H Shaun Crowe  mL/hr at 02/13/21 1258 100 mg at 02/13/21 1258    glucose (GLUTOSE) 40 % oral gel 15 g  15 g Oral PRN Shaun Crowe MD        dextrose 50 % IV solution  12.5 g Intravenous PRN Shaun Crowe MD        glucagon (rDNA) injection 1 mg  1 mg Intramuscular PRN Shaun Crowe MD        dextrose 5 % solution  100 mL/hr Intravenous PRN Shaun Crowe MD            Labs:     Recent Labs     02/12/21  1546 02/13/21  0356   WBC 5.2 4.2*   RBC 4.98 4.70   HGB 13.9* 13.2*   HCT 43.7 41.9*   MCV 87.8 89.1   MCH 27.9 28.1   MCHC 31.8* 31.5*    164     Recent Labs     02/12/21  1546 02/12/21  1609 02/13/21  0356   *  --  132*   K 3.9 3.5 4.2   ANIONGAP 12  --  12   CL 93*  --  96*   CO2 28  --  24   BUN 49*  --  50*   CREATININE 2.2*  --  2.1*   GLUCOSE 251*  --  205*   CALCIUM 9.0  --  8.1*     Recent Labs     02/13/21  0356   MG 1.6     Recent Labs     02/12/21  1546   AST 47*   ALT 30   BILITOT 0.4   ALKPHOS 130     ABGs:No results for input(s): PH, PO2, PCO2, HCO3, BE, O2SAT in the last 72 hours. Troponin T:   Recent Labs     02/12/21  1546   TROPONINI 1.07*     INR: No results for input(s): INR in the last 72 hours. Lactic Acid:   Recent Labs     02/12/21  1546   LACTA 2.0*       Objective:   Vitals: /67   Pulse 67   Temp 97.5 °F (36.4 °C) (Temporal)   Resp 22   Ht 6' (1.829 m)   Wt 232 lb (105.2 kg)   SpO2 90%   BMI 31.46 kg/m²   24HR INTAKE/OUTPUT:      Intake/Output Summary (Last 24 hours) at 2/13/2021 1305  Last data filed at 2/13/2021 8920  Gross per 24 hour   Intake 290 ml   Output 150 ml   Net 140 ml     General appearance: alert and cooperative with exam  HEENT: AT/NC  Lungs: BLAE, +diffuse wheezing, +coarse breath sounds bilaterally  Heart: RRR  Abdomen: BS+, soft, NT, ND  Extremities: pulses+  Neurologic: Alert, gross motor function intact  Skin: warm    Assessment and Plan:    Active Problems:    Sepsis ([address]  Resolved Problems:    * No resolved hospital problems. *    Pneumonia/COPD Exacerbation: Broad spectrum antibiotics. Bronchodilators. O2 PRN. Follow-up cultures. CT Chest. Repeat LA tomorrow. Elevated troponin: Denies chest pain. ?demand ischemia. EKG noted. Trend. Cardiology consult. TTE. Monitor on telemetry. On aspirin and statin. ANA/CKD III: IVF. Renal US. Monitor BMP. DM: HbA1c 6.2. Monitor BG and adjust medications PRN. Supportive management. PT/OT/SLP.     Advance Directive: Full Code    DVT prophylaxis: Lovenox    Discharge planning: TBD      Signed:  Edwar Tarango MD 2/13/2021 1:05 PM  Rounding Hospitalist

## 2021-02-13 NOTE — H&P
Hospitalist History & Physical  [facility]    Patient: Lucrecia Mello   : 1940   MRN: [id number]  Code Status: Full Code  PCP: Haleigh Sims MD  Date of Service: 2021    Chief Complaint:   Shortness of breath    History of Present Illness:   28-year-old male with past medical history as listed below presented to ER with shortness of breath, generalized weakness, and fatigue. Recent hospitalization for bilateral pneumonia requiring intubation. Current imaging demonstrates worsening pneumonia. COPD. Prior tobacco dependence. Denies supplemental O2 requirement at home. Denies chest pain or palpitations. Review of Systems:   A comprehensive review of systems was negative except for: Constitutional: positive for fatigue  Respiratory: positive for shortness of breath    Past Medical History:     Past Medical History:   Diagnosis Date    Acute liver failure without hepatic coma 10/23/2018    Back pain     \"with tired legs as a result\"    Bladder cancer ([facility] [address] 2018    Blood circulation, collateral     Carotid arterial disease (HCC)     recent surgery    CKD (chronic kidney disease), 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/2018    GERD (gastroesophageal reflux disease)     Hyperlipidemia     Hypertension     Hypertension     Palliative care patient 10/23/2018    Pneumonia due to infectious organism 2018    Primary osteoarthritis of left knee 10/14/2018    PVD (peripheral vascular disease) (HCC)     Tremor     Tremor on Right side x 1-2 weeks per stepdaughter    Type 2 diabetes mellitus with complication, without long-term current use of insulin ([facility] [address] 2021         Past Surgical History:     Past Surgical History:   Procedure Laterality Date    BACK SURGERY      COLONOSCOPY  ?     CYSTOSCOPY Bilateral 2018    CYSTOSCOPY, BIOSPY FULGURATION OF BLADDER TUMOR POSSIBLE TURBT, RETROGRADE PYELOGRAM performed by Anabell Cooper MD at [address] [place] [address] COLONOSCOPY FLX DX by mouth daily  tamsulosin (FLOMAX) 0.4 MG capsule, Take 1 capsule by mouth daily  pantoprazole (PROTONIX) 40 MG tablet, Take 1 tablet by mouth daily  trospium (SANCTURA) 20 MG tablet, Take 1 tablet by mouth nightly     Allergies: Allergies   Allergen Reactions    Eliquis [Apixaban] Other (See Comments)     \"almost bled to death\"    Promethazine Hcl Other (See Comments)     He became encephalopathic for several hours and was not responsive.          Physical Exam:   /66   Pulse 76   Temp 98.1 °F (36.7 °C) (Temporal)   Resp 22   Ht 6' (1.829 m)   Wt 235 lb (106.6 kg)   SpO2 (!) 87% Comment: rn is aware  BMI 31.87 kg/m²     General: no acute distress  HEENT: normocephalic, atraumatic  Neck: supple, symmetrical, trachea midline   Lungs: bilateral rhonchi  Cardiovascular: s1 and s2 normal  Abdomen: soft, positive bowel sounds  Extremities: no edema or cyanosis   Neuro: aaox3, no focal deficits   Skin: normal color and texture     Recent Results (from the past 72 hour(s))   CBC Auto Differential    Collection Time: 02/12/21  3:46 PM   Result Value Ref Range    WBC 5.2 4.8 - 10.8 K/uL    RBC 4.98 4.70 - 6.10 M/uL    Hemoglobin 13.9 (L) 14.0 - 18.0 g/dL    Hematocrit 43.7 42.0 - 52.0 %    MCV 87.8 80.0 - 94.0 fL    MCH 27.9 27.0 - 31.0 pg    MCHC 31.8 (L) 33.0 - 37.0 g/dL    RDW 13.1 11.5 - 14.5 %    Platelets 897 780 - 895 K/uL    MPV 11.0 9.4 - 12.4 fL    Neutrophils % 79.5 (H) 50.0 - 65.0 %    Lymphocytes % 12.0 (L) 20.0 - 40.0 %    Monocytes % 5.0 0.0 - 10.0 %    Eosinophils % 0.0 0.0 - 5.0 %    Basophils % 0.6 0.0 - 1.0 %    Neutrophils Absolute 4.1 1.5 - 7.5 K/uL    Immature Granulocytes # 0.2 K/uL    Lymphocytes Absolute 0.6 (L) 1.1 - 4.5 K/uL    Monocytes Absolute 0.30 0.00 - 0.90 K/uL    Eosinophils Absolute 0.00 0.00 - 0.60 K/uL    Basophils Absolute 0.00 0.00 - 0.20 K/uL   Comprehensive Metabolic Panel    Collection Time: 02/12/21  3:46 PM   Result Value Ref Range    Sodium 133 (L) 136 - 145 mmol/L    Potassium 3.9 3.5 - 5.0 mmol/L    Chloride 93 (L) 98 - 111 mmol/L    CO2 28 22 - 29 mmol/L    Anion Gap 12 7 - 19 mmol/L    Glucose 251 (H) 74 - 109 mg/dL    BUN 49 (H) 8 - 23 mg/dL    CREATININE 2.2 (H) 0.5 - 1.2 mg/dL    GFR Non-African American 29 (A) >60    GFR  35 (L) >59    Calcium 9.0 8.8 - 10.2 mg/dL    Total Protein 6.3 (L) 6.6 - 8.7 g/dL    Albumin 3.5 3.5 - 5.2 g/dL    Total Bilirubin 0.4 0.2 - 1.2 mg/dL    Alkaline Phosphatase 130 40 - 130 U/L    ALT 30 5 - 41 U/L    AST 47 (H) 5 - 40 U/L   Lactic Acid, Plasma    Collection Time: 02/12/21  3:46 PM   Result Value Ref Range    Lactic Acid 2.0 (HH) 0.5 - 1.9 mmol/L   Troponin    Collection Time: 02/12/21  3:46 PM   Result Value Ref Range    Troponin 1.07 (HH) 0.00 - 0.03 ng/mL   Brain Natriuretic Peptide    Collection Time: 02/12/21  3:46 PM   Result Value Ref Range    Pro-BNP 3,526 (H) 0 - 1,800 pg/mL   Blood Gas, Arterial    Collection Time: 02/12/21  4:09 PM   Result Value Ref Range    pH, Arterial 7.400 7.350 - 7.450    pCO2, Arterial 49.0 (H) 35.0 - 45.0 mmHg    pO2, Arterial 66.0 (L) 80.0 - 100.0 mmHg    HCO3, Arterial 30.4 (H) 22.0 - 26.0 mmol/L    Base Excess, Arterial 4.5 (H) -2.0 - 2.0 mmol/L    Hemoglobin, Art, Extended 14.2 14.0 - 18.0 g/dL    O2 Sat, Arterial 90.3 >92 %    Carboxyhgb, Arterial 1.9 0.0 - 5.0 %    Methemoglobin, Arterial 0.8 <1.5 %    O2 Content, Arterial 18.0 Not Established mL/dL    O2 Therapy Unknown    Potassium, Whole Blood    Collection Time: 02/12/21  4:09 PM   Result Value Ref Range    Potassium, Whole Blood 3.5    Respiratory Panel, Molecular, with COVID-19 (Restricted: peds pts or suitable admitted adults)    Collection Time: 02/12/21  4:15 PM    Specimen: Nasopharyngeal   Result Value Ref Range    Adenovirus by PCR Not Detected Not Detected    Bordetella parapertussis by PCR Not Detected Not Detected    Bordetella pertussis by PCR Not Detected Not Detected    Chlamydophilia pneumoniae by PCR Not Detected Not Detected    Coronavirus 229E by PCR Not Detected Not Detected    Coronavirus HKU1 by PCR Not Detected Not Detected    Coronavirus NL63 by PCR Not Detected Not Detected    Coronavirus OC43 by PCR Not Detected Not Detected    SARS-CoV-2, PCR Not Detected Not Detected    Human Metapneumovirus by PCR Not Detected Not Detected    Human Rhinovirus/Enterovirus by PCR Not Detected Not Detected    Influenza A by PCR Not Detected Not Detected    Influenza B by PCR Not Detected Not Detected    Mycoplasma pneumoniae by PCR Not Detected Not Detected    Parainfluenza Virus 1 by PCR Not Detected Not Detected    Parainfluenza Virus 2 by PCR Not Detected Not Detected    Parainfluenza Virus 3 by PCR Not Detected Not Detected    Parainfluenza Virus 4 by PCR Not Detected Not Detected    Respiratory Syncytial Virus by PCR Not Detected Not Detected   POCT Glucose    Collection Time: 02/12/21  9:18 PM   Result Value Ref Range    POC Glucose 217 (H) 70 - 99 mg/dl    Performed on [employer]        No intake/output data recorded. Xr Chest Portable    Result Date: 2/12/2021  Bilateral pneumonia, though worse when compared to the prior exam. Follow-up PA and lateral chest radiograph to recommended in 6-8 weeks to document resolution.  Signed by Dr Roderick Keenan on 2/12/2021 4:14 PM      Assessment and Plan:   Sepsis  Secondary to below  Agents as below  Follow cultures  IVF  Lactate    CAP with high risk for MDR pathogens  Recently hospitalized for bilateral pneumonia requiring intubation  Broad-spectrum antibiotics  Follow cultures  Molecular respiratory panel negative  CT chest/bronchoscopy as warranted    COPD exacerbation  Steroids  Nebs  Antibiotics  Goal sats 88-92%    Acute hypoxemic respiratory failure  Secondary to above processes  3 L NC  Denies supplemental home O2 requirement  Wean as tolerated  Oximetry studies as warranted    Prior tobacco dependence  Counseled on continued cessation    CKD 3  Follow renal function/urine output  Avoid offending agents    Chronically elevated troponin  Denies chest pain  Current level lower than multiple prior levels    DM2  Meds on board    DVT prophylaxis  Lovenox    Total time spent managing the care of this patient: 70 minutes    Jazmin Tate MD  2/12/2021 11:04 PM

## 2021-02-13 NOTE — CONSULTS
[facility]) Cardiology   Consult Note       Requesting MD:  Mendel Mustard, MD   Admit Status:  Inpatient [101]       Patient:    Luis Jha  [id number]  1940     History obtained from:   Patient    Chief Complaint:   Chief Complaint   Patient presents with    Fever    Shortness of Breath         HPI: Patient is a 79 y.o. male with a history of recurrence of shortness of breath and pneumonia. He was admitted 3 weeks ago for respiratory failure and pneumonia. He was intubated for short period of time. He was discharged to rehab and went home. Last couple days patient has been getting more shortness of breath. This time he was readmitted for recurrence of bilateral pneumonia. Patient was a heavy smoker and quit 2002. He drinks 2 glasses of wine a day. He is known to have stage III/stage IV renal insufficiency. He denies any history of myocardial infarction or CVA. Is known to have peripheral vascular disease and asymptomatic left carotid stenosis. He has been treated for hypertension and hyperlipidemia. He is obese.   In a good day, patient cannot walk that far because of chronic back pain and shortness of breath    Review of Systems:  Review of Systems    Cardiac Specific Data:  Specialty Problems        Cardiology Problems    Atherosclerosis of native artery of both lower extremities with intermittent claudication (HCC)        Bilateral carotid artery stenosis        Carotid stenosis, asymptomatic, left        Essential hypertension        Pure hypercholesterolemia        CHF (congestive heart failure) ([address]              Past Medical History:  Past Medical History:   Diagnosis Date    Acute liver failure without hepatic coma 10/23/2018    Back pain     \"with tired legs as a result\"    Bladder cancer ([address] 12/19/2018    Blood circulation, collateral     Carotid arterial disease ([address]     recent surgery    CKD (chronic kidney disease), 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/2018    GERD (gastroesophageal reflux disease)     Hyperlipidemia     Hypertension     Hypertension     Palliative care patient 10/23/2018    Pneumonia due to infectious organism 11/06/2018    Primary osteoarthritis of left knee 10/14/2018    PVD (peripheral vascular disease) (HCC)     Tremor     Tremor on Right side x 1-2 weeks per stepdaughter    Type 2 diabetes mellitus with complication, without long-term current use of insulin ([facility] 1/21/2021        Past Surgical History:  Past Surgical History:   Procedure Laterality Date    BACK SURGERY      COLONOSCOPY  2007?  CYSTOSCOPY Bilateral 12/19/2018    CYSTOSCOPY, BIOSPY FULGURATION OF BLADDER TUMOR POSSIBLE TURBT, RETROGRADE PYELOGRAM performed by Abdirashid Trujillo MD at [facility] COLONOSCOPY FLX DX W/COLLJ SPEC WHEN PFRMD N/A 9/11/2017    Dr Shaq Christy internal hemorrhoids, diverticular disease-HP-No recall (age)   [place] REVISE MEDIAN N/CARPAL TUNNEL SURG Left 7/18/2018    OPEN CARPAL TUNNEL RELEASE performed by Marcin Schwartz MD at [address] Left 8/28/2018    LEFT CAROTID ENDARTERECTOMY WITH VEIN PATCH ANGIOPLASTY AND COMPLETION ANGIOGRAM performed by Ximena Thomas MD at [address] 10/15/2018    LEFT COMPLEX TOTAL KNEE ARTHROPLASTY performed by Marcin Schwartz MD at [facility] VASCULAR SURGERY  04/21/2015    Bakari BESS Ultrasound guided access of left common femoral artery. Aortogram.Diagnostic right lower extremity arteriogram.Radiologic supervision and interpretation.  VASCULAR SURGERY  01/13/2015    Bakari Harvey M.D Atherectomy,angioplasty,and stenting of left superficial femoral artery.  VASCULAR SURGERY  03/11/2014    Bakari Harvey M.D. Ultrasound-guided access of right common femoral artery. Aortogram.Left lower extremity arteriogram.Atherectomy and angioplasty of left superficial femoral artery. Radiologic supervision and interpretation.     VASCULAR file     Attends [employer] service: Not on file     Active member of club or organization: Not on file     Attends meetings of clubs or organizations: Not on file     Relationship status: Not on file    Intimate partner violence     Fear of current or ex partner: Not on file     Emotionally abused: Not on file     Physically abused: Not on file     Forced sexual activity: Not on file   Other Topics Concern    Not on file   Social History Narrative    Not on file       Allergies: Allergies   Allergen Reactions    Eliquis [Apixaban] Other (See Comments)     \"almost bled to death\"    Promethazine Hcl Other (See Comments)     He became encephalopathic for several hours and was not responsive. Home Meds:  Prior to Admission medications    Medication Sig Start Date End Date Taking?  Authorizing Provider   aspirin 81 MG EC tablet Take 1 tablet by mouth daily 1/30/21   Ayanna Woodward MD   glipiZIDE (GLUCOTROL) 5 MG tablet Take 1 tablet by mouth every morning (before breakfast) 1/30/21   Ayanna Woodward MD   atorvastatin (LIPITOR) 20 MG tablet Take 1 tablet by mouth daily 1/29/21   Ayanna Woodward MD   metoprolol succinate (TOPROL XL) 100 MG extended release tablet Take 1 tablet by mouth daily 1/30/21   Ayanna Woodward MD   NIFEdipine (ADALAT CC) 60 MG extended release tablet Take 1 tablet by mouth daily 1/30/21   Ayanna Woodward MD   tamsulosin (FLOMAX) 0.4 MG capsule Take 1 capsule by mouth daily 1/30/21   Ayanna Woodward MD   pantoprazole (PROTONIX) 40 MG tablet Take 1 tablet by mouth daily 1/30/21   Ayanna Woodward MD   trospium (SANCTURA) 20 MG tablet Take 1 tablet by mouth nightly 1/29/21   Ayanna Woodward MD       Current Meds:   budesonide  0.5 mg Nebulization BID    Arformoterol Tartrate  15 mcg Nebulization BID    cefepime  2,000 mg Intravenous Q12H    vancomycin (VANCOCIN) intermittent dosing (placeholder)   Other RX Placeholder    aspirin  81 mg Oral Daily    atorvastatin  20 mg Oral Daily    metoprolol succinate  100 mg Oral Daily    NIFEdipine  60 mg Oral Daily    pantoprazole  40 mg Oral Daily    tamsulosin  0.4 mg Oral Daily    trospium  20 mg Oral Nightly    sodium chloride flush  10 mL Intravenous 2 times per day    enoxaparin  40 mg Subcutaneous Q24H    insulin lispro  0-6 Units Subcutaneous TID WC    insulin lispro  0-3 Units Subcutaneous Nightly    methylPREDNISolone  40 mg Intravenous Q8H    ipratropium-albuterol  1 ampule Inhalation Q4H    doxycycline (VIBRAMYCIN) IV  100 mg Intravenous Q12H       Current Infused Meds:   sodium chloride 100 mL/hr at 02/13/21 1258    dextrose         Physical Exam:  Vitals:    02/13/21 1515   BP:    Pulse:    Resp:    Temp:    SpO2: (!) 88%       Intake/Output Summary (Last 24 hours) at 2/13/2021 1553  Last data filed at 2/13/2021 1524  Gross per 24 hour   Intake 510 ml   Output 350 ml   Net 160 ml     Estimated body mass index is 31.46 kg/m² as calculated from the following:    Height as of this encounter: 6' (1.829 m). Weight as of this encounter: 232 lb (105.2 kg).     Physical Exam  Head and neck: Normal  Chest exam: Decreased air entry with occasional rales bilateral lung bases  Cardiovascular exam: Heart sounds distant, normal neck vein, no gross murmur  Abdominal exam: Obese abdomen, nontender, bowel sounds normal  CNS exam: Grossly normal  Peripheral vascular, trace swelling of the ankles, decreased pedal pulses    Labs:  Recent Labs     02/12/21  1546 02/13/21  0356   WBC 5.2 4.2*   HGB 13.9* 13.2*    164       Recent Labs     02/12/21  1546 02/12/21  1609 02/13/21  0356   *  --  132*   K 3.9 3.5 4.2   CL 93*  --  96*   CO2 28  --  24   BUN 49*  --  50*   CREATININE 2.2*  --  2.1*   LABGLOM 29*  --  31*   MG  --   --  1.6   CALCIUM 9.0  --  8.1*       CK, CKMB, Troponin:   Recent Labs     02/12/21  1546 02/13/21  1302   TROPONINI 1.07* 0.53*       Last 3 BNP:  Recent Labs     02/12/21  1546   PROBNP 3,526*         IMAGING: EKG -  ECHO-    CTA-  Nuclear stress test-  Previous Cath-      Ct Head Wo Contrast    Result Date: 1/18/2021  No acute intracranial abnormality. The above study was initially reviewed and reported by stat rads. I do not find any discrepancies. Signed by Dr Christopher Flower on 1/18/2021 6:47 AM    Ct Chest Wo Contrast    Result Date: 2/13/2021  Consolidating infiltrates within both lungs, greatest in the right lower lobe and most compatible with bilateral pneumonia, there is a small right pleural effusion and trace left pleural effusion. No pneumothorax is identified. There is no lung abscess or areas of cavitation. Mild underlying changes of paraseptal emphysema are noted. No measurable intrathoracic lymphadenopathy is identified. Signed by Dr Andie Barrera on 2/13/2021 2:28 PM    Us Renal Complete    Result Date: 2/13/2021  Bilateral renal cysts as detailed above, with a benign appearance. Some smaller cysts seen in the kidneys on previous CT are not visualized on today's examination No hydronephrosis is identified. Both kidneys have normal cortical echogenicity. The bladder is unremarkable. Signed by Dr Andie Barrera on 2/13/2021 3:21 PM    Xr Chest Portable    Result Date: 2/12/2021  Bilateral pneumonia, though worse when compared to the prior exam. Follow-up PA and lateral chest radiograph to recommended in 6-8 weeks to document resolution. Signed by Dr Cuate Cheng on 2/12/2021 4:14 PM    Xr Chest Portable    Result Date: 1/18/2021  1. New NG tube in good position. Patient remains intubated. 2. Bilateral infiltrates without significant change. Signed by Dr Scott Estimable on 1/18/2021 7:16 AM    Xr Chest Portable    Result Date: 1/18/2021  1. Hypoventilation. 2. Bilateral infiltrates are likely infectious/inflammatory. Signed by Dr Scott Estimable on 1/18/2021 7:14 AM    Xr Chest Portable    Result Date: 1/18/2021  1. Endotracheal tube tip at the T4 level.  2. Bilateral infiltrates without significant change. Signed by Dr Christofer Jackson on 1/18/2021 7:13 AM       Assessment and Plan:  1. Mildly elevated troponin but trending down, this is probably related to stage III/stage IV renal insufficiency. EKG did not show any ischemic changes. Echocardiogram was performed recently and showed preserved left ventricular systolic wall motion. From cardiac standpoint there will be no cardiac stress testing until patient is more hemodynamically stable and more stable pulmonary status  2. Bilateral pneumonia with recent admission for respiratory failure and mechanical intubation  3. Chronic renal failure  4.  Peripheral vascular disease         Roel Devi MD   2/13/2021, 3:53 PM

## 2021-02-13 NOTE — PROGRESS NOTES
Attempted to wean off the oxygen, pt's O2 sat remains 85-86% with 8L, 40% oxygen per Venturi mask. Increased to 8L , 50 % per RT. O2 sat returned to 89% at this time. Pt states:\" I feel fine\". Will monitor closely.

## 2021-02-13 NOTE — PROGRESS NOTES
Pharmacy Vancomycin Consult     Vancomycin Day: 2  Current Dosing: Pulse dosing    Temp max:  98.5    Recent Labs     02/12/21  1546   BUN 49*       Recent Labs     02/12/21  1546   CREATININE 2.2*       Recent Labs     02/12/21  1546   WBC 5.2         Intake/Output Summary (Last 24 hours) at 2/13/2021 0501  Last data filed at 2/13/2021 0400  Gross per 24 hour   Intake 50 ml   Output    Net 50 ml     No current cultures at this time  Culture Date Source Results                      Ht Readings from Last 1 Encounters:   02/12/21 6' (1.829 m)        Wt Readings from Last 1 Encounters:   02/13/21 232 lb (105.2 kg)         Body mass index is 31.46 kg/m². Estimated Creatinine Clearance: 34 mL/min (A) (based on SCr of 2.2 mg/dL (H)).     Trough: 7.7    Assessment/Plan: Give Vancomycin 1500mg x1 dose today with random level in the morning    Electronically signed by John Rodney [facility] on 2/13/2021 at 5:01 AM

## 2021-02-13 NOTE — PROGRESS NOTES
Pharmacy Note  Vancomycin Consult    Barrett Dolan is a 79 y.o. male started on Vancomycin for Pneumonia (HAP); consult received from Dr. Clair Rene to manage therapy. Also receiving the following antibiotics: azithromycin, cefepime. Active Problems:    * No active hospital problems. *  Resolved Problems:    * No resolved hospital problems. *      Allergies:  Eliquis [apixaban] and Promethazine hcl     Temp max: 98.5    Recent Labs     02/12/21  1546   BUN 49*       Recent Labs     02/12/21  1546   CREATININE 2.2*       Recent Labs     02/12/21  1546   WBC 5.2       No intake or output data in the 24 hours ending 02/12/21 1827    Culture Date Source Results   02/12/21 Blood Sent   02/12/21 Respiratory PCR panel negative       Ht Readings from Last 1 Encounters:   02/12/21 6' (1.829 m)        Wt Readings from Last 1 Encounters:   02/12/21 235 lb (106.6 kg)         Body mass index is 31.87 kg/m². Estimated Creatinine Clearance: 34 mL/min (A) (based on SCr of 2.2 mg/dL (H)). Assessment/Plan:  Will initiate vancomycin pulse dosing. Pt received vancomycin 1000 mg IV once in the ED. Random level ordered. Thank you for the consult. Will continue to follow.     Electronically signed by Gayle Boucher, [address] [place] [address] on 2/12/2021 at 6:27 PM

## 2021-02-14 LAB
ALBUMIN SERPL-MCNC: 2.8 G/DL (ref 3.5–5.2)
ALP BLD-CCNC: 101 U/L (ref 40–130)
ALT SERPL-CCNC: 42 U/L (ref 5–41)
ANION GAP SERPL CALCULATED.3IONS-SCNC: 10 MMOL/L (ref 7–19)
AST SERPL-CCNC: 52 U/L (ref 5–40)
BASOPHILS ABSOLUTE: 0 K/UL (ref 0–0.2)
BASOPHILS RELATIVE PERCENT: 0.1 % (ref 0–1)
BILIRUB SERPL-MCNC: 0.3 MG/DL (ref 0.2–1.2)
BUN BLDV-MCNC: 55 MG/DL (ref 8–23)
CALCIUM SERPL-MCNC: 8.5 MG/DL (ref 8.8–10.2)
CHLORIDE BLD-SCNC: 96 MMOL/L (ref 98–111)
CO2: 26 MMOL/L (ref 22–29)
CREAT SERPL-MCNC: 2.1 MG/DL (ref 0.5–1.2)
EOSINOPHILS ABSOLUTE: 0 K/UL (ref 0–0.6)
EOSINOPHILS RELATIVE PERCENT: 0 % (ref 0–5)
GFR AFRICAN AMERICAN: 37
GFR NON-AFRICAN AMERICAN: 31
GLUCOSE BLD-MCNC: 243 MG/DL (ref 74–109)
GLUCOSE BLD-MCNC: 250 MG/DL (ref 70–99)
GLUCOSE BLD-MCNC: 287 MG/DL (ref 70–99)
GLUCOSE BLD-MCNC: 290 MG/DL (ref 70–99)
GLUCOSE BLD-MCNC: 310 MG/DL (ref 70–99)
HCT VFR BLD CALC: 39.8 % (ref 42–52)
HEMOGLOBIN: 12.8 G/DL (ref 14–18)
IMMATURE GRANULOCYTES #: 0.1 K/UL
LACTIC ACID: 0.7 MMOL/L (ref 0.5–1.9)
LV EF: 63 %
LVEF MODALITY: NORMAL
LYMPHOCYTES ABSOLUTE: 0.4 K/UL (ref 1.1–4.5)
LYMPHOCYTES RELATIVE PERCENT: 6.3 % (ref 20–40)
MAGNESIUM: 1.6 MG/DL (ref 1.6–2.4)
MCH RBC QN AUTO: 28 PG (ref 27–31)
MCHC RBC AUTO-ENTMCNC: 32.2 G/DL (ref 33–37)
MCV RBC AUTO: 87.1 FL (ref 80–94)
MONOCYTES ABSOLUTE: 0.2 K/UL (ref 0–0.9)
MONOCYTES RELATIVE PERCENT: 3.5 % (ref 0–10)
NEUTROPHILS ABSOLUTE: 6.1 K/UL (ref 1.5–7.5)
NEUTROPHILS RELATIVE PERCENT: 88.9 % (ref 50–65)
PDW BLD-RTO: 12.7 % (ref 11.5–14.5)
PERFORMED ON: ABNORMAL
PLATELET # BLD: 179 K/UL (ref 130–400)
PMV BLD AUTO: 11.3 FL (ref 9.4–12.4)
POTASSIUM SERPL-SCNC: 4.1 MMOL/L (ref 3.5–5)
PROCALCITONIN: 0.33 NG/ML (ref 0–0.09)
RBC # BLD: 4.57 M/UL (ref 4.7–6.1)
SODIUM BLD-SCNC: 132 MMOL/L (ref 136–145)
STREP PNEUMONIAE ANTIGEN, URINE: NORMAL
TOTAL PROTEIN: 5.7 G/DL (ref 6.6–8.7)
TROPONIN: 0.48 NG/ML (ref 0–0.03)
VANCOMYCIN TROUGH: 14.3 UG/ML (ref 10–20)
WBC # BLD: 6.8 K/UL (ref 4.8–10.8)

## 2021-02-14 PROCEDURE — 97530 THERAPEUTIC ACTIVITIES: CPT

## 2021-02-14 PROCEDURE — 84145 PROCALCITONIN (PCT): CPT

## 2021-02-14 PROCEDURE — 6370000000 HC RX 637 (ALT 250 FOR IP): Performed by: INTERNAL MEDICINE

## 2021-02-14 PROCEDURE — 93306 TTE W/DOPPLER COMPLETE: CPT

## 2021-02-14 PROCEDURE — 6360000002 HC RX W HCPCS: Performed by: EMERGENCY MEDICINE

## 2021-02-14 PROCEDURE — 80202 ASSAY OF VANCOMYCIN: CPT

## 2021-02-14 PROCEDURE — 99232 SBSQ HOSP IP/OBS MODERATE 35: CPT | Performed by: INTERNAL MEDICINE

## 2021-02-14 PROCEDURE — 94640 AIRWAY INHALATION TREATMENT: CPT

## 2021-02-14 PROCEDURE — 2580000003 HC RX 258: Performed by: EMERGENCY MEDICINE

## 2021-02-14 PROCEDURE — 87040 BLOOD CULTURE FOR BACTERIA: CPT

## 2021-02-14 PROCEDURE — 84484 ASSAY OF TROPONIN QUANT: CPT

## 2021-02-14 PROCEDURE — 82947 ASSAY GLUCOSE BLOOD QUANT: CPT

## 2021-02-14 PROCEDURE — 80053 COMPREHEN METABOLIC PANEL: CPT

## 2021-02-14 PROCEDURE — 83605 ASSAY OF LACTIC ACID: CPT

## 2021-02-14 PROCEDURE — 83735 ASSAY OF MAGNESIUM: CPT

## 2021-02-14 PROCEDURE — 2500000003 HC RX 250 WO HCPCS: Performed by: INTERNAL MEDICINE

## 2021-02-14 PROCEDURE — 6360000002 HC RX W HCPCS: Performed by: INTERNAL MEDICINE

## 2021-02-14 PROCEDURE — 2140000000 HC CCU INTERMEDIATE R&B

## 2021-02-14 PROCEDURE — 87449 NOS EACH ORGANISM AG IA: CPT

## 2021-02-14 PROCEDURE — 97162 PT EVAL MOD COMPLEX 30 MIN: CPT

## 2021-02-14 PROCEDURE — 2700000000 HC OXYGEN THERAPY PER DAY

## 2021-02-14 PROCEDURE — 85025 COMPLETE CBC W/AUTO DIFF WBC: CPT

## 2021-02-14 PROCEDURE — 2580000003 HC RX 258: Performed by: INTERNAL MEDICINE

## 2021-02-14 PROCEDURE — 36415 COLL VENOUS BLD VENIPUNCTURE: CPT

## 2021-02-14 RX ORDER — METHYLPREDNISOLONE SODIUM SUCCINATE 40 MG/ML
40 INJECTION, POWDER, LYOPHILIZED, FOR SOLUTION INTRAMUSCULAR; INTRAVENOUS EVERY 12 HOURS
Status: DISCONTINUED | OUTPATIENT
Start: 2021-02-15 | End: 2021-02-15

## 2021-02-14 RX ORDER — GUAIFENESIN 600 MG/1
1200 TABLET, EXTENDED RELEASE ORAL 2 TIMES DAILY
Status: DISCONTINUED | OUTPATIENT
Start: 2021-02-14 | End: 2021-02-24 | Stop reason: HOSPADM

## 2021-02-14 RX ORDER — GUAIFENESIN 100 MG/5ML
200 SYRUP ORAL EVERY 4 HOURS PRN
Status: DISCONTINUED | OUTPATIENT
Start: 2021-02-14 | End: 2021-02-24 | Stop reason: HOSPADM

## 2021-02-14 RX ORDER — LACTULOSE 10 G/15ML
10 SOLUTION ORAL 3 TIMES DAILY PRN
Status: DISCONTINUED | OUTPATIENT
Start: 2021-02-14 | End: 2021-02-20

## 2021-02-14 RX ORDER — SENNA PLUS 8.6 MG/1
1 TABLET ORAL NIGHTLY
Status: DISCONTINUED | OUTPATIENT
Start: 2021-02-14 | End: 2021-02-24 | Stop reason: HOSPADM

## 2021-02-14 RX ORDER — BISACODYL 10 MG
10 SUPPOSITORY, RECTAL RECTAL DAILY PRN
Status: DISCONTINUED | OUTPATIENT
Start: 2021-02-14 | End: 2021-02-24 | Stop reason: HOSPADM

## 2021-02-14 RX ORDER — DOCUSATE SODIUM 100 MG/1
100 CAPSULE, LIQUID FILLED ORAL DAILY
Status: DISCONTINUED | OUTPATIENT
Start: 2021-02-14 | End: 2021-02-24 | Stop reason: HOSPADM

## 2021-02-14 RX ADMIN — NIFEDIPINE 60 MG: 60 TABLET, FILM COATED, EXTENDED RELEASE ORAL at 08:32

## 2021-02-14 RX ADMIN — ARFORMOTEROL TARTRATE 15 MCG: 15 SOLUTION RESPIRATORY (INHALATION) at 06:46

## 2021-02-14 RX ADMIN — INSULIN LISPRO 2 UNITS: 100 INJECTION, SOLUTION INTRAVENOUS; SUBCUTANEOUS at 22:14

## 2021-02-14 RX ADMIN — VANCOMYCIN HYDROCHLORIDE 1250 MG: 10 INJECTION, POWDER, LYOPHILIZED, FOR SOLUTION INTRAVENOUS at 04:10

## 2021-02-14 RX ADMIN — TROSPIUM CHLORIDE 20 MG: 20 TABLET, FILM COATED ORAL at 22:12

## 2021-02-14 RX ADMIN — METHYLPREDNISOLONE SODIUM SUCCINATE 40 MG: 40 INJECTION, POWDER, FOR SOLUTION INTRAMUSCULAR; INTRAVENOUS at 12:11

## 2021-02-14 RX ADMIN — IPRATROPIUM BROMIDE AND ALBUTEROL SULFATE 1 AMPULE: 2.5; .5 SOLUTION RESPIRATORY (INHALATION) at 02:24

## 2021-02-14 RX ADMIN — SODIUM CHLORIDE, PRESERVATIVE FREE 10 ML: 5 INJECTION INTRAVENOUS at 22:13

## 2021-02-14 RX ADMIN — CEFEPIME HYDROCHLORIDE 2000 MG: 2 INJECTION, POWDER, FOR SOLUTION INTRAVENOUS at 16:39

## 2021-02-14 RX ADMIN — METHYLPREDNISOLONE SODIUM SUCCINATE 40 MG: 40 INJECTION, POWDER, FOR SOLUTION INTRAMUSCULAR; INTRAVENOUS at 04:11

## 2021-02-14 RX ADMIN — ENOXAPARIN SODIUM 40 MG: 40 INJECTION SUBCUTANEOUS at 22:13

## 2021-02-14 RX ADMIN — STANDARDIZED SENNA CONCENTRATE 8.6 MG: 8.6 TABLET ORAL at 22:12

## 2021-02-14 RX ADMIN — ARFORMOTEROL TARTRATE 15 MCG: 15 SOLUTION RESPIRATORY (INHALATION) at 18:18

## 2021-02-14 RX ADMIN — CEFEPIME HYDROCHLORIDE 2000 MG: 2 INJECTION, POWDER, FOR SOLUTION INTRAVENOUS at 04:10

## 2021-02-14 RX ADMIN — POLYETHYLENE GLYCOL 3350 17 G: 17 POWDER, FOR SOLUTION ORAL at 22:33

## 2021-02-14 RX ADMIN — DOXYCYCLINE 100 MG: 100 INJECTION, POWDER, LYOPHILIZED, FOR SOLUTION INTRAVENOUS at 01:30

## 2021-02-14 RX ADMIN — IPRATROPIUM BROMIDE AND ALBUTEROL SULFATE 1 AMPULE: 2.5; .5 SOLUTION RESPIRATORY (INHALATION) at 10:34

## 2021-02-14 RX ADMIN — DOCUSATE SODIUM 100 MG: 100 CAPSULE, LIQUID FILLED ORAL at 13:14

## 2021-02-14 RX ADMIN — BUDESONIDE 500 MCG: 0.5 INHALANT RESPIRATORY (INHALATION) at 06:46

## 2021-02-14 RX ADMIN — DOXYCYCLINE 100 MG: 100 INJECTION, POWDER, LYOPHILIZED, FOR SOLUTION INTRAVENOUS at 13:14

## 2021-02-14 RX ADMIN — IPRATROPIUM BROMIDE AND ALBUTEROL SULFATE 1 AMPULE: 2.5; .5 SOLUTION RESPIRATORY (INHALATION) at 14:33

## 2021-02-14 RX ADMIN — GUAIFENESIN 1200 MG: 600 TABLET, EXTENDED RELEASE ORAL at 22:12

## 2021-02-14 RX ADMIN — PANTOPRAZOLE SODIUM 40 MG: 40 TABLET, DELAYED RELEASE ORAL at 08:32

## 2021-02-14 RX ADMIN — SODIUM CHLORIDE: 9 INJECTION, SOLUTION INTRAVENOUS at 02:00

## 2021-02-14 RX ADMIN — INSULIN LISPRO 3 UNITS: 100 INJECTION, SOLUTION INTRAVENOUS; SUBCUTANEOUS at 17:40

## 2021-02-14 RX ADMIN — IPRATROPIUM BROMIDE AND ALBUTEROL SULFATE 1 AMPULE: 2.5; .5 SOLUTION RESPIRATORY (INHALATION) at 22:12

## 2021-02-14 RX ADMIN — BUDESONIDE 500 MCG: 0.5 INHALANT RESPIRATORY (INHALATION) at 18:18

## 2021-02-14 RX ADMIN — IPRATROPIUM BROMIDE AND ALBUTEROL SULFATE 1 AMPULE: 2.5; .5 SOLUTION RESPIRATORY (INHALATION) at 18:18

## 2021-02-14 RX ADMIN — ASPIRIN 81 MG: 81 TABLET, COATED ORAL at 08:32

## 2021-02-14 RX ADMIN — TAMSULOSIN HYDROCHLORIDE 0.4 MG: 0.4 CAPSULE ORAL at 08:32

## 2021-02-14 RX ADMIN — ATORVASTATIN CALCIUM 20 MG: 20 TABLET, FILM COATED ORAL at 08:32

## 2021-02-14 RX ADMIN — SODIUM CHLORIDE, PRESERVATIVE FREE 10 ML: 5 INJECTION INTRAVENOUS at 09:15

## 2021-02-14 RX ADMIN — INSULIN LISPRO 3 UNITS: 100 INJECTION, SOLUTION INTRAVENOUS; SUBCUTANEOUS at 12:30

## 2021-02-14 RX ADMIN — IPRATROPIUM BROMIDE AND ALBUTEROL SULFATE 1 AMPULE: 2.5; .5 SOLUTION RESPIRATORY (INHALATION) at 06:45

## 2021-02-14 RX ADMIN — INSULIN LISPRO 3 UNITS: 100 INJECTION, SOLUTION INTRAVENOUS; SUBCUTANEOUS at 08:32

## 2021-02-14 RX ADMIN — METOPROLOL SUCCINATE 100 MG: 50 TABLET, EXTENDED RELEASE ORAL at 08:32

## 2021-02-14 ASSESSMENT — PAIN SCALES - GENERAL: PAINLEVEL_OUTOF10: 0

## 2021-02-14 NOTE — PROGRESS NOTES
Physical Therapy    Facility/Department: [facility] [place] PROGRESSIVE CARE  Initial Assessment    NAME: Miguel Baker  : 1940  MRN: [id number]    Date of Service: 2021    Discharge Recommendations:  Continue to assess pending progress, Patient would benefit from continued therapy after discharge, 24 hour supervision or assist        Assessment   Body structures, Functions, Activity limitations: Decreased functional mobility ; Decreased endurance;Decreased posture  Assessment: Pt. will benefit from cont. PT to decrease impairments. Pt. a fall risk, and should not attempt mobility on his own at this time due to low endurance. Pt. with increased work of breathing with transfers and short amb. distance. Anticipate pt may need additional therapy prior to d/c home. Pt. reports he did not have any pain at this time, but reports he has pain with sitting in our recliners. Treatment Diagnosis: impaired gait and mobility  Prognosis: Good  Decision Making: Medium Complexity  PT Education: PT Role;General Safety;Goals;Transfer Training;Gait Training;Functional Mobility Training  Patient Education: use of call light for staff A  Barriers to Learning: none noted  REQUIRES PT FOLLOW UP: Yes  Activity Tolerance  Activity Tolerance: Patient limited by fatigue;Patient limited by endurance       Patient Diagnosis(es): The primary encounter diagnosis was Sepsis, due to unspecified organism, unspecified whether acute organ dysfunction present ([address] Diagnoses of Acute respiratory failure with hypoxia ([address] Pneumonia of both lungs due to infectious organism, unspecified part of lung, Elevated troponin, and ANA (acute kidney injury) ([address] were also pertinent to this visit.      has a past medical history of Acute liver failure without hepatic coma, Back pain, Bladder cancer ([address] Blood circulation, collateral, Carotid arterial disease ([address] CKD (chronic kidney disease), stage II, GERD (gastroesophageal reflux disease), Hyperlipidemia, Denies  Pre Treatment Pain Screening  Intervention List: Patient able to continue with treatment    Orientation  Orientation  Overall Orientation Status: Within Functional Limits  Social/Functional History  Social/Functional History  Lives With: Spouse  Type of Home: House  Home Layout: One level  Home Equipment: Rolling walker, 4 wheeled walker  Receives Help From: Home health(HHPT and RN)  ADL Assistance: Independent  Ambulation Assistance: Independent  Transfer Assistance: Independent  Additional Comments: pt reports that when he came home from the hospital he was doing everything independently, but he gradually got weaker and weaker, requiring more A  Cognition   Cognition  Overall Cognitive Status: WFL    Objective     Observation/Palpation  Posture: Fair  Observation: R hip appears elevated    AROM RLE (degrees)  RLE AROM: WFL  AROM LLE (degrees)  LLE AROM : WFL  Strength RLE  Strength RLE: Exception  Comment: grossly 3+/5  Strength LLE  Strength LLE: Exception  Comment: grossly 3+/5     Sensation  Overall Sensation Status: WNL  Bed mobility  Supine to Sit: Stand by assistance  Sit to Supine: Stand by assistance  Scooting: Stand by assistance  Comment: once in supine with HOB flat, pt able to pull self up with BUEs  Transfers  Sit to Stand: Contact guard assistance  Stand to sit: Contact guard assistance  Comment: pt stood twice, once with RW and once without. Pt. sat on EOB x 15 mins SBA.   Ambulation  Ambulation?: Yes  Ambulation 1  Surface: level tile  Device: Rolling Walker  Other Apparatus: O2(venturi mask)  Assistance: Contact guard assistance  Quality of Gait: steady with RW  Gait Deviations: Slow Nae;Decreased step length;Decreased step height  Distance: 6'  Comments: pt limited in amb. distance due to venturi mask and low endurance     Balance  Posture: Fair  Sitting - Static: Fair  Sitting - Dynamic: Fair;-  Standing - Static: Fair;-  Standing - Dynamic: Fair;-        Plan   Plan  Times per week:

## 2021-02-14 NOTE — PROGRESS NOTES
Pharmacy Vancomycin Consult     Vancomycin Day: 3  Current Dosing: Pulse dosing    Temp max:  98.5    Recent Labs     02/13/21  0356 02/14/21  0047   BUN 50* 55*       Recent Labs     02/13/21  0356 02/14/21  0047   CREATININE 2.1* 2.1*       Recent Labs     02/13/21  0356 02/14/21  0047   WBC 4.2* 6.8         Intake/Output Summary (Last 24 hours) at 2/14/2021 0317  Last data filed at 2/14/2021 0147  Gross per 24 hour   Intake 1622 ml   Output 650 ml   Net 972 ml       Culture Date Source Results   02/12/21 Blood Gram+ cocci                 Ht Readings from Last 1 Encounters:   02/12/21 6' (1.829 m)        Wt Readings from Last 1 Encounters:   02/13/21 232 lb (105.2 kg)         Body mass index is 31.46 kg/m². Estimated Creatinine Clearance: 36 mL/min (A) (based on SCr of 2.1 mg/dL (H)).     Trough: 14.3    Assessment/Plan: Give Vancomycin 1250mg x1 dose today with random  level in AM    Electronically signed by Елена Deleon [facility] on 2/14/2021 at 3:17 AM

## 2021-02-14 NOTE — PROGRESS NOTES
[facility]        Hospitalist Progress Note  2/14/2021 1:00 PM  Subjective:   Admit Date: 2/12/2021  PCP: Aneudy Angelo MD    Chief Complaint: Dyspnea    Subjective: Patient seen and examined at bedside. Cough. Mild improvement. Denies chest pain. Cumulative Hospital History: 56-year-old obese male with a past medical history of COPD not on home oxygen, CKD stage III, diabetes, hypertension, BPH, h/o bladder CA recently admitted for bilateral pneumonia requiring intubation and mechanical intubation discharged to inpatient rehab (by me) with improvement and eventual discharge home. Patient presents back to the ED with worsening dyspnea found to have worsening bilateral pneumonia on CXR, poor PO intake and associated ANA on CKD and elevated troponin. Cardiology consulted for elevated troponin. Troponin trending down - needs improvement in pulmonary status prior to stress testing or intervention from cardiology. Noted to have 2 of 2 bottles staph bacteremia and infectious disease consulted. ROS: 14 point review of systems is negative except as specifically addressed above. DIET CARB CONTROL;     Intake/Output Summary (Last 24 hours) at 2/14/2021 1300  Last data filed at 2/14/2021 7182  Gross per 24 hour   Intake 2125 ml   Output 800 ml   Net 1325 ml     Medications:   sodium chloride 100 mL/hr at 02/14/21 0200    dextrose       Current Facility-Administered Medications   Medication Dose Route Frequency Provider Last Rate Last Admin    [START ON 2/15/2021] methylPREDNISolone sodium (SOLU-MEDROL) injection 40 mg  40 mg Intravenous Q12H Pinky Alva MD        guaiFENesin (ROBITUSSIN) 100 MG/5ML syrup 200 mg  200 mg Oral Q4H PRN Pinky Alva MD        Benzocaine-Menthol (CEPACOL) 1 lozenge  1 lozenge Oral Q2H PRN Pinky Alva MD        phenol 1.4 % mouth spray 1 spray  1 spray Mouth/Throat Q2H PRN Pinky Alva MD        docusate sodium (COLACE) capsule 100 mg  100 mg Oral Daily Simran Vela Venessa Cruz MD        [facility]) tablet 8.6 mg  1 tablet Oral Nightly Sam Carcamo MD        lactulose [facility]) 10 GM/15ML solution 10 g  10 g Oral TID PRN Sam Carcamo MD        bisacodyl (DULCOLAX) suppository 10 mg  10 mg Rectal Daily PRN Sam Carcamo MD        budesonide (PULMICORT) nebulizer suspension 500 mcg  0.5 mg Nebulization BID Sam Carcamo MD   500 mcg at 02/14/21 0646    Arformoterol Tartrate (BROVANA) nebulizer solution 15 mcg  15 mcg Nebulization BID Sam Carcamo MD   15 mcg at 02/14/21 0646    0.9 % sodium chloride infusion   Intravenous Continuous Sam Carcamo  mL/hr at 02/14/21 0200 New Bag at 02/14/21 0200    ceFEPIme (MAXIPIME) 2,000 mg in sterile water 20 mL IV syringe  2,000 mg Intravenous Q12H Sheron Pringle MD   2,000 mg at 02/14/21 0410    vancomycin (VANCOCIN) intermittent dosing (placeholder)   Other RX Placeholder Sheron Pringle MD        aspirin EC tablet 81 mg  81 mg Oral Daily Sheron Pringle MD   81 mg at 02/14/21 6597    atorvastatin (LIPITOR) tablet 20 mg  20 mg Oral Daily Sheron Pringle MD   20 mg at 02/14/21 1628    metoprolol succinate (TOPROL XL) extended release tablet 100 mg  100 mg Oral Daily Sheron Pringle MD   100 mg at 02/14/21 0387    NIFEdipine (PROCARDIA XL) extended release tablet 60 mg  60 mg Oral Daily Sheron Pringle MD   60 mg at 02/14/21 8602    pantoprazole (PROTONIX) tablet 40 mg  40 mg Oral Daily Sheron Pringle MD   40 mg at 02/14/21 1087    tamsulosin (FLOMAX) capsule 0.4 mg  0.4 mg Oral Daily Sheron Pringle MD   0.4 mg at 02/14/21 7677    trospium (SANCTURA) tablet 20 mg  20 mg Oral Nightly Sheron Pringle MD   20 mg at 02/13/21 2026    sodium chloride flush 0.9 % injection 10 mL  10 mL Intravenous 2 times per day Sehron Pringle MD   10 mL at 02/14/21 0915    sodium chloride flush 0.9 % injection 10 mL  10 mL Intravenous PRN Sheron Pringle MD        enoxaparin (LOVENOX) injection 40 mg  40 mg Subcutaneous Q24H Sheron Pringle last 72 hours. Troponin T:   Recent Labs     02/13/21  1302 02/13/21  1805 02/14/21  0047   TROPONINI 0.53* 0.51* 0.48*     INR: No results for input(s): INR in the last 72 hours. Lactic Acid:   Recent Labs     02/14/21  0237   LACTA 0.7       Objective:   Vitals: /65   Pulse 73   Temp 97.2 °F (36.2 °C) (Temporal)   Resp 18   Ht 6' (1.829 m)   Wt 238 lb 1 oz (108 kg)   SpO2 90%   BMI 32.29 kg/m²   24HR INTAKE/OUTPUT:      Intake/Output Summary (Last 24 hours) at 2/14/2021 1300  Last data filed at 2/14/2021 8775  Gross per 24 hour   Intake 2125 ml   Output 800 ml   Net 1325 ml     General appearance: alert and cooperative with exam  HEENT: AT/NC  Lungs: BLAE, +mild diffuse wheezing, +coarse breath sounds bilaterally  Heart: RRR  Abdomen: BS+, soft, NT, ND  Extremities: pulses+  Neurologic: Alert, gross motor function intact  Skin: warm    Assessment and Plan: Active Problems:    Sepsis ([address]  Resolved Problems:    * No resolved hospital problems. *    Pneumonia/COPD Exacerbation: Broad spectrum antibiotics. Bronchodilators. O2 PRN. Follow-up cultures. CT Chest noted. Elevated troponin: Trending down. Cardiology on board. Denies chest pain. Cardiology on board. TTE pending. Monitor on telemetry. On aspirin and statin. Staph Bacteremia: ID consulted. On broad spectrum antibiotics. ANA/CKD III: IVF. Renal US noted. Monitor BMP. DM: HbA1c 6.2. Monitor BG and adjust medications PRN. Supportive management. PT/OT/SLP.     Advance Directive: Full Code    DVT prophylaxis: Lovenox    Discharge planning: TBD      Signed:  Melinda Soliman MD 2/14/2021 1:00 PM  Rounding Hospitalist

## 2021-02-14 NOTE — PROGRESS NOTES
Cardiology Progress Note   Lorenso Dakins, MD      Patient:    Karen Jimenez  [id number]  1940    Patient Active Problem List    Diagnosis Date Noted    Sepsis ([address] 02/12/2021     Priority: Low    Low back pain 01/22/2021     Priority: Low    Acute respiratory failure ([address] 01/21/2021     Priority: Low    Type 2 diabetes mellitus with complication, without long-term current use of insulin ([address] 01/21/2021     Priority: Low    Weakness 01/21/2021     Priority: Low    Other dysphagia 01/21/2021     Priority: Low    Severe sepsis ([address] 01/18/2021     Priority: Low    Acute on chronic respiratory failure ([address] 01/18/2021     Priority: Low    UTI (urinary tract infection) 01/18/2021     Priority: Low    Acute on chronic kidney failure ([address] 01/18/2021     Priority: Low    Hypoxemia 01/18/2021     Priority: Low    Metabolic acidosis 30/90/4686     Priority: Low    Acidosis, metabolic, with respiratory acidosis 01/18/2021     Priority: Low    History of bladder cancer 06/15/2020     Priority: Low    Bladder cancer ([address] 12/19/2018     Priority: Low    Postoperative pain 12/19/2018     Priority: Low    Epistaxis 11/06/2018     Priority: Low    Acute blood loss anemia 11/06/2018     Priority: Low    Melena 11/06/2018     Priority: Low    Hypocalcemia 11/06/2018     Priority: Low    Pneumonia due to infectious organism 11/06/2018     Priority: Low    Bleeding diathesis ([address] 11/05/2018     Priority: Low    BPH associated with nocturia      Priority: Low    Acute renal failure ([address]      Priority: Low    Shock liver      Priority: Low    Acute liver failure without hepatic coma 10/23/2018     Priority: Low    CHF (congestive heart failure) ([address] 10/23/2018     Priority: Low    Bilateral pleural effusion 10/23/2018     Priority: Low    Palliative care patient 10/23/2018     Priority: Low    Arthritis of knee 10/15/2018     Priority: Low    Essential hypertension 10/15/2018     Priority: Low  Pure hypercholesterolemia 10/15/2018     Priority: Low    Slow transit constipation 10/15/2018     Priority: Low    Iron deficiency anemia 10/15/2018     Priority: Low    GERD (gastroesophageal reflux disease) 10/15/2018     Priority: Low    Primary osteoarthritis of left knee 10/14/2018     Priority: Low    Carotid stenosis, asymptomatic, left 08/28/2018     Priority: Low    Bilateral carotid artery stenosis 06/25/2018     Priority: Low    Atherosclerosis of native artery of both lower extremities with intermittent claudication ([address] 06/25/2018     Priority: Low    Colonic diverticular abscess 08/01/2017     Priority: Low    Generalized abdominal pain      Priority: Low    Diverticulitis of large intestine with perforation without bleeding 06/04/2017     Priority: Low       Admit Date:  2/12/2021    Admission Problem List: Present on Admission:   Sepsis [facility])       Cardiac Specific Data:  Specialty Problems        Cardiology Problems    Atherosclerosis of native artery of both lower extremities with intermittent claudication (HCC)        Bilateral carotid artery stenosis        Carotid stenosis, asymptomatic, left        Essential hypertension        Pure hypercholesterolemia        CHF (congestive heart failure) (HCC)              Subjective:  Patient 67-year-old Y male admitted for recurrence of pneumonia and respiratory failure. Troponin was mildly elevated and is trending down. Patient does have chronic renal insufficiency. Today he is feeling better. He denies any chest pain. Objective:   /65   Pulse 73   Temp 97.2 °F (36.2 °C) (Temporal)   Resp 18   Ht 6' (1.829 m)   Wt 238 lb 1 oz (108 kg)   SpO2 90%   BMI 32.29 kg/m²       Intake/Output Summary (Last 24 hours) at 2/14/2021 1138  Last data filed at 2/14/2021 2152  Gross per 24 hour   Intake 2245 ml   Output 800 ml   Net 1445 ml       Prior to Admission medications    Medication Sig Start Date End Date Taking?  Authorizing Provider   aspirin 81 MG EC tablet Take 1 tablet by mouth daily 1/30/21   Grace Cardenas MD   glipiZIDE (GLUCOTROL) 5 MG tablet Take 1 tablet by mouth every morning (before breakfast) 1/30/21   Grace Cardenas MD   atorvastatin (LIPITOR) 20 MG tablet Take 1 tablet by mouth daily 1/29/21   Grace Cardenas MD   metoprolol succinate (TOPROL XL) 100 MG extended release tablet Take 1 tablet by mouth daily 1/30/21   Grace Cardenas MD   NIFEdipine (ADALAT CC) 60 MG extended release tablet Take 1 tablet by mouth daily 1/30/21   Grace Cardenas MD   tamsulosin (FLOMAX) 0.4 MG capsule Take 1 capsule by mouth daily 1/30/21   Grace Cardenas MD   pantoprazole (PROTONIX) 40 MG tablet Take 1 tablet by mouth daily 1/30/21   Grace Cardenas MD   trospium (SANCTURA) 20 MG tablet Take 1 tablet by mouth nightly 1/29/21   Grace Cardenas MD        budesonide  0.5 mg Nebulization BID    Arformoterol Tartrate  15 mcg Nebulization BID    cefepime  2,000 mg Intravenous Q12H    vancomycin (VANCOCIN) intermittent dosing (placeholder)   Other RX Placeholder    aspirin  81 mg Oral Daily    atorvastatin  20 mg Oral Daily    metoprolol succinate  100 mg Oral Daily    NIFEdipine  60 mg Oral Daily    pantoprazole  40 mg Oral Daily    tamsulosin  0.4 mg Oral Daily    trospium  20 mg Oral Nightly    sodium chloride flush  10 mL Intravenous 2 times per day    enoxaparin  40 mg Subcutaneous Q24H    insulin lispro  0-6 Units Subcutaneous TID WC    insulin lispro  0-3 Units Subcutaneous Nightly    methylPREDNISolone  40 mg Intravenous Q8H    ipratropium-albuterol  1 ampule Inhalation Q4H    doxycycline (VIBRAMYCIN) IV  100 mg Intravenous Q12H       TELEMETRY: Sinus     Physical Exam:  General: NAD, alert  HEENT: normal  CHEST: Decreased air entry with rales bilateral lung bases right side more than the left  CVS: S1 and S2 normal, no murmur, no JVD  MSK: normal  CNS: oriented X3, normal affect, normal CN  PVD:  all peripheral pulses normal, no swelling of the ankles  Abdominal exam: Obese abdomen nontender    RECENT LABS:    Lab Data:  CBC:   Recent Labs     02/12/21  1546 02/13/21  0356 02/14/21  0047   WBC 5.2 4.2* 6.8   HGB 13.9* 13.2* 12.8*   HCT 43.7 41.9* 39.8*   MCV 87.8 89.1 87.1    164 179     BMP:   Recent Labs     02/12/21  1546 02/12/21  1609 02/13/21  0356 02/14/21  0047   *  --  132* 132*   K 3.9 3.5 4.2 4.1   CL 93*  --  96* 96*   CO2 28  --  24 26   BUN 49*  --  50* 55*   CREATININE 2.2*  --  2.1* 2.1*     LIVER PROFILE:   Recent Labs     02/12/21  1546 02/14/21  0047   AST 47* 52*   ALT 30 42*   BILITOT 0.4 0.3   ALKPHOS 130 101     PT/INR: No results for input(s): PROTIME, INR in the last 72 hours. APTT: No results for input(s): APTT in the last 72 hours. CK, CKMB, Troponin:   Recent Labs     02/13/21  1302 02/13/21  1805 02/14/21  0047   TROPONINI 0.53* 0.51* 0.48*       Last 3 BNP:  Recent Labs     02/12/21  1546   PROBNP 3,526*       IMAGING:  Ct Chest Wo Contrast    Result Date: 2/13/2021  Consolidating infiltrates within both lungs, greatest in the right lower lobe and most compatible with bilateral pneumonia, there is a small right pleural effusion and trace left pleural effusion. No pneumothorax is identified. There is no lung abscess or areas of cavitation. Mild underlying changes of paraseptal emphysema are noted. No measurable intrathoracic lymphadenopathy is identified. Signed by Dr Jarod Barrera on 2/13/2021 2:28 PM    Us Renal Complete    Result Date: 2/13/2021  Bilateral renal cysts as detailed above, with a benign appearance. Some smaller cysts seen in the kidneys on previous CT are not visualized on today's examination No hydronephrosis is identified. Both kidneys have normal cortical echogenicity. The bladder is unremarkable. Signed by Dr Jarod Barrera on 2/13/2021 3:21 PM    Xr Chest Portable    Result Date: 2/12/2021  Bilateral pneumonia, though worse when compared to the prior exam. Follow-up PA and lateral chest radiograph to recommended in 6-8 weeks to document resolution.  Signed by Dr Cuate Cheng on 2/12/2021 4:14 PM         Assessment and Plan:    #1 recurrence of bilateral pneumonia, improving  #2 mildly elevated troponin and trending down from chronic renal insufficiency, from cardiac standpoint no cardiac investigations at this point until pulmonary status is stabilized        Jacey Peace MD  2/14/2021 11:38 AM

## 2021-02-14 NOTE — CONSULTS
CONSULTATION NOTE :ID       Patient - Yury Patrick,  Age - 79 y.o.    - 1940      Room Number - 2193/165-55   MRN -  [id number]   Acct # - [de-identified]  Date of Admission -  2021  3:35 PM  Patient's PCP: Reagan Andrew MD     Requesting Physician: Terence Mcintosh MD    REASON FOR CONSULTATION   staph bacteremia      HISTORY OF PRESENT ILLNESS       This is a very pleasant 79 y.o. male who was admitted to the hospital with a chief complaints of worsening shortness of breath and fatigue. He was hospitalized in January and transferred to inpatient rehab  after an ICU stay including intubation, iv abx ( vanco and zosyn per notes) and the dc on cefdinir and azithro on     The patients notes he got weaker and more SOA within a few days of getting home. He notes that he has cycled with SOA and weakness for 2-3 weeks followed by spontaneous recovery \" ever since knee surgery\"      He states Dr Irina Rico has worked him up extensively as an outpatient. He denies any recent animal or environmental exposures.  He did have a per pa  rrot for years but that has been over 15 years    PAST MEDICAL AND SURGICAL HISTORY       Past Medical History:   Diagnosis Date    Acute liver failure without hepatic coma 10/23/2018    Back pain     \"with tired legs as a result\"    Bladder cancer ([facility] 2018    Blood circulation, collateral     Carotid arterial disease (HCC)     recent surgery    CKD (chronic kidney disease), 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/2018    GERD (gastroesophageal reflux disease)     Hyperlipidemia     Hypertension     Hypertension     Palliative care patient 10/23/2018    Pneumonia due to infectious organism 2018    Primary osteoarthritis of left knee 10/14/2018    PVD [facility]peripheral vascular disease) [facility]     Tremor     Tremor on Right side x 1-2 weeks per stepdaughter  Type 2 diabetes mellitus with complication, without long-term current use of insulin ([facility] 1/21/2021       Past Surgical History:   Procedure Laterality Date    BACK SURGERY      COLONOSCOPY  2007?  CYSTOSCOPY Bilateral 12/19/2018    CYSTOSCOPY, BIOSPY FULGURATION OF BLADDER TUMOR POSSIBLE TURBT, RETROGRADE PYELOGRAM performed by Cheli Parisi MD at [facility] COLONOSCOPY FLX DX W/COLLJ SPEC WHEN PFRMD N/A 9/11/2017    Dr Michael Paul internal hemorrhoids, diverticular disease-HP-No recall (age)   Ximena Harlan MA REVISE MEDIAN N/CARPAL TUNNEL SURG Left 7/18/2018    OPEN CARPAL TUNNEL RELEASE performed by Marielos Montes MD at [address] Left 8/28/2018    LEFT CAROTID ENDARTERECTOMY WITH VEIN PATCH ANGIOPLASTY AND COMPLETION ANGIOGRAM performed by Jayce Pascual MD at [address] 10/15/2018    LEFT COMPLEX TOTAL KNEE ARTHROPLASTY performed by Marielos Montes MD at [facility] VASCULAR SURGERY  04/21/2015    Klever BESS Ultrasound guided access of left common femoral artery. Aortogram.Diagnostic right lower extremity arteriogram.Radiologic supervision and interpretation.  VASCULAR SURGERY  01/13/2015    Klever Menendez M.D Atherectomy,angioplasty,and stenting of left superficial femoral artery.  VASCULAR SURGERY  03/11/2014    Klever Menendez M.D. Ultrasound-guided access of right common femoral artery. Aortogram.Left lower extremity arteriogram.Atherectomy and angioplasty of left superficial femoral artery. Radiologic supervision and interpretation.  VASCULAR SURGERY  01/18/2013    Klever BESS Aortogram.Multistation arteriogram right lower extremity. Laser atherectomy and angioplasty of right superficial femoral artery. Selective catheterization of right tibioperoneal trunk. Angioplasty of peroneal artery and tibioperoneal trunk.     VASCULAR SURGERY  10/30/2018 [facility].Ultrasound guided cannulation of right internal vein. Placement of right internal jugular vein tunneled dialysis catheter bard tian xk 23cm tip to cuff    VASCULAR SURGERY  12/17/2018    [facility]. Removal of tunneled dilaysis catheter right internal jugular vein. MEDICATIONS :       Scheduled Meds:   [START ON 2/15/2021] methylPREDNISolone  40 mg Intravenous Q12H    docusate sodium  100 mg Oral Daily    senna  1 tablet Oral Nightly    budesonide  0.5 mg Nebulization BID    Arformoterol Tartrate  15 mcg Nebulization BID    cefepime  2,000 mg Intravenous Q12H    vancomycin (VANCOCIN) intermittent dosing (placeholder)   Other RX Placeholder    aspirin  81 mg Oral Daily    atorvastatin  20 mg Oral Daily    metoprolol succinate  100 mg Oral Daily    NIFEdipine  60 mg Oral Daily    pantoprazole  40 mg Oral Daily    tamsulosin  0.4 mg Oral Daily    trospium  20 mg Oral Nightly    sodium chloride flush  10 mL Intravenous 2 times per day    enoxaparin  40 mg Subcutaneous Q24H    insulin lispro  0-6 Units Subcutaneous TID WC    insulin lispro  0-3 Units Subcutaneous Nightly    ipratropium-albuterol  1 ampule Inhalation Q4H    doxycycline (VIBRAMYCIN) IV  100 mg Intravenous Q12H     Continuous Infusions:   sodium chloride 100 mL/hr at 02/14/21 0200    dextrose       PRN Meds:guaiFENesin, Benzocaine-Menthol, phenol, lactulose, bisacodyl, sodium chloride flush, polyethylene glycol, acetaminophen **OR** acetaminophen, glucose, dextrose, glucagon (rDNA), dextrose    ALLERGIES:      Eliquis [apixaban] and Promethazine hcl      SOCIAL HISTORY:     TOBACCO:   reports that he quit smoking about 17 years ago. He has never used smokeless tobacco.     ETOH:   reports current alcohol use of about 12.0 standard drinks of alcohol per week.   [facility] [place]      FAMILY HISTORY:         Problem Relation Age of Onset    Colon Cancer Father     Diabetes Brother  Colon Polyps Neg Hx     Liver Cancer Neg Hx     Liver Disease Neg Hx     Esophageal Cancer Neg Hx     Rectal Cancer Neg Hx     Stomach Cancer Neg Hx        REVIEW OF SYSTEMS:     Constitutional: no fever, one episode of drenching  Sweats 3-4 weeks ago, fatigue  Ears:  hearing difficulty  Mouth/throat: no  dysphagia  Lungs:  cough  and shortness of breath  CVS: no palpitation, no chest pain  GI: no abdominal pain, no nausea , no vomiting, no diarrhea  KASANDRA: no dysuria, frequency and urgency  Musculoskeletal: no focal joint pain, swelling   Neuro: no headache, no numbness, no tingling  Endocrine: no polyuria, polydipsia  Dermatology: no skin rash, no boils  Psychiatry: depression when ill    PHYSICAL EXAM:     /65   Pulse 73   Temp 97.2 °F (36.2 °C) (Temporal)   Resp 18   Ht 6' (1.829 m)   Wt 238 lb 1 oz (108 kg)   SpO2 90%   BMI 32.29 kg/m²  Temp (24hrs), Av.4 °F (36.3 °C), Min:97.2 °F (36.2 °C), Max:97.5 °F (36.4 °C)    General:  Awake, alert, lying flat in bed  HEENT: O2 per face mask in place  Chest:  Decreased BS  Cardiovascular:  RRR ,S1S2, distant heart tones  Abdomen:  Soft, non tender to palpation, BS positive  Extremities: No pretibial edema well healed left knee scar  Skin:  Warm and dry. CNS: Moves all extremities, speech clear, follows commands. Hard of hearing  PSYCH: alert, pleasant and cooperative.       LABS:     CBC with DIFF:   Recent Labs     21  1546 21  0356 21  0047   WBC 5.2 4.2* 6.8   RBC 4.98 4.70 4.57*   HGB 13.9* 13.2* 12.8*   HCT 43.7 41.9* 39.8*   MCV 87.8 89.1 87.1   MCH 27.9 28.1 28.0   MCHC 31.8* 31.5* 32.2*   RDW 13.1 13.1 12.7    164 179   MPV 11.0 11.1 11.3   NEUTOPHILPCT 79.5* 86.3* 88.9*   LYMPHOPCT 12.0* 8.9* 6.3*   MONOPCT 5.0 2.6 3.5   EOSRELPCT 0.0 0.0 0.0   BASOPCT 0.6 0.5 0.1   NEUTROABS 4.1 3.6 6.1   LYMPHSABS 0.6* 0.4* 0.4*   MONOSABS 0.30 0.10 0.20   EOSABS 0.00 0.00 0.00   BASOSABS 0.00 0.00 0.00       CMP/BMP: section 564 (b)(1) of the Act, 21 U. S.C. 995RHT-7 (b) (1),   unless the authorization is terminated or revoked sooner.      Patient Fact SettlementContracts.gl   Provider Fact SettlementContracts.gl     METHODOLOGY: Multiplex PCR        Human Metapneumovirus by PCR Not Detected    Human Rhinovirus/Enterovirus by PCR Not Detected    Influenza A by PCR Not Detected    Influenza B by PCR Not Detected    Mycoplasma pneumoniae by PCR Not Detected    Parainfluenza Virus 1 by PCR Not Detected    Parainfluenza Virus 2 by PCR Not Detected    Parainfluenza Virus 3 by PCR Not Detected    Parainfluenza Virus 4 by PCR Not Detected    Respiratory Syncytial Virus by PCR              IMAGING:             Ct Chest Wo Contrast    Result Date: 2/13/2021 EXAMINATION: CT CHEST WO CONTRAST 2/13/2021 2:17 PM HISTORY: Shortness of breath, bilateral pneumonia. DOSE: 976 mGycm. Automatic exposure control was utilized in an effort to use as little radiation as possible, without compromising image quality. REPORT: Spiral CT of the chest was performed without contrast, reconstructed coronal and sagittal images are also reviewed. COMPARISON: Portable chest x-ray to 12/20/2021. Review of lung windows demonstrates consolidative infiltrates in both lungs, on the right, this includes extensive infiltrate surrounding the right hilum, with contiguous infiltrate in the right upper lobe, with air bronchograms, there is also consolidation of the right lower lobe with air bronchograms and focal subpleural nodular infiltrates are identified in the right middle lobe and right upper lobe focally. In the left lung, there is a subpleural infiltrate in the left upper lobe along the major fissure, with mild paraseptal emphysematous changes. This is contiguous with mixed groundglass and consolidative infiltrates in the lingula and there is consolidation of the left lower lobe with air bronchograms, with associated bronchial wall thickening. A small amount of left perihilar infiltrate is also present. No pneumothorax is identified, there is a trace left pleural effusion, small right pleural effusion. No lung abscess or cavitation is seen in association with the areas of pneumonia. Soft tissue windows show a 12 mm low-attenuation lesion in the right thyroid lobe, which is most likely benign. There are normal-sized shotty appearing lymph nodes within the mediastinum without measurable lymphadenopathy. A small amount calcified plaques noted within the aortic arch and there is heavy calcified plaque within the coronary arteries. Heart size is normal. There is trace pericardial fluid. The visualized upper abdomen shows no acute abnormality. There is mild bilateral gynecomastia.  Review of bone windows shows advanced degenerative changes in the visualized upper lumbar spine. Consolidating infiltrates within both lungs, greatest in the right lower lobe and most compatible with bilateral pneumonia, there is a small right pleural effusion and trace left pleural effusion. No pneumothorax is identified. There is no lung abscess or areas of cavitation. Mild underlying changes of paraseptal emphysema are noted. No measurable intrathoracic lymphadenopathy is identified. Signed by Dr Mandeep Cisse. [place]co on 2/13/2021 2:28 PM    Us Renal Complete    Result Date: 2/13/2021  EXAMINATION: US RENAL COMPLETE 2/13/2021 3:16 PM HISTORY: Acute kidney injury. Report: Sonographic images of the kidneys were obtained. COMPARISON: CT of the abdomen and pelvis with without contrast 7/31/2020. Ultrasound of the kidneys 10/23/2018. The right kidney measures 11.6 x 5.5 x 6.0 cm and has normal cortical echogenicity, with a cortical thickness that averages 12 mm. No solid mass is identified. There are 2 cysts in the right kidney appear benign, including one at the interpolar level measures 1.3 x 1.4 x 1.0 cm (previous measurement of 0.9 x 0.8 x 0.9 cm) and one at the inferior pole that measures 2.5 x 2.9 x 2.1 cm. This cyst previously measured 2.7 x 2.2 x 2.0 cm and is essentially unchanged Color Doppler images demonstrate vascular flow within the right kidney. The left kidney measures 10.4 x 4.5 x 4.0 cm and has normal morphology and cortical echogenicity without evidence of hydronephrosis or solid mass. There is a benign-appearing cyst at the inferior pole that measures 1 cm, not clearly seen on the previous ultrasound. Color Doppler images demonstrate vascular flow within the left kidney. Additional small cysts are seen in the kidneys on prior CT but poorly visualized on today's examination. The bladder is within normal limits. Bilateral renal cysts as detailed above, with a benign appearance. Some smaller cysts seen in the kidneys on previous CT are not visualized on today's examination No hydronephrosis is identified. Both kidneys have normal cortical echogenicity. The bladder is unremarkable. Signed by Dr Faye Ortiz. Holly on 2/13/2021 3:21 PM    Xr Chest Portable    Result Date: 2/12/2021  EXAMINATION: XR CHEST PORTABLE 2/12/2021 4:13 PM HISTORY: Shortness of breath, hypoxia, recent pneumonia COMPARISON: 1/18/2021 FINDINGS: The heart appears normal in size. Atherosclerotic calcifications are seen in the aorta. Bilateral airspace opacities are present with relative sparing of the upper lobes, markedly increased compared to the prior exam. There is no appreciable pneumothorax or definite pleural effusion. Bilateral pneumonia, though worse when compared to the prior exam. Follow-up PA and lateral chest radiograph to recommended in 6-8 weeks to document resolution.  Signed by Dr Abbie Leonard on 2/12/2021 4:14 PM          ASSESSMENT AND PLAN     Patient Active Problem List   Diagnosis    Diverticulitis of large intestine with perforation without bleeding    Generalized abdominal pain    Colonic diverticular abscess    Bilateral carotid artery stenosis    Atherosclerosis of native artery of both lower extremities with intermittent claudication (HCC)    Carotid stenosis, asymptomatic, left    Primary osteoarthritis of left knee    Arthritis of knee    Essential hypertension    Pure hypercholesterolemia    Slow transit constipation    Iron deficiency anemia    GERD (gastroesophageal reflux disease)    Acute liver failure without hepatic coma    CHF (congestive heart failure) (HCC)    Bilateral pleural effusion    Palliative care patient    Shock liver    Acute renal failure (HCC)    BPH associated with nocturia    Bleeding diathesis ([address]    Epistaxis    Acute blood loss anemia    Melena    Hypocalcemia  Pneumonia due to infectious organism    Bladder cancer ([facility]    Postoperative pain    History of bladder cancer    Severe sepsis (HCC)    Acute on chronic respiratory failure (HCC)    UTI (urinary tract infection)    Acute on chronic kidney failure (HCC)    Hypoxemia    Metabolic acidosis    Acidosis, metabolic, with respiratory acidosis    Acute respiratory failure (HCC)    Type 2 diabetes mellitus with complication, without long-term current use of insulin (HCC)    Weakness    Other dysphagia    Low back pain    Sepsis (HCC)     Positive blood cultures - Staph epi in one bottle of one set and second set - drawn in ED with difficulty per patient - also one bottle of one set - referred to other ( ie not identified)- Suspect drawn from right antecubital iv site in ED - contaminant.  Not consistent with pulmonary pathogen      Add mucinex  contiunue broad abx  Sputum for gram stain and culture  Sputum for KOH and fungal culture  If no information from that, may need bronch if O2 stable enough      Thank you Elizabeth Peralta MD for allowing me to participate in this patient's care  Electronically signed by Jose Luis Parks MD on 2/14/2021 at 2:18 PM

## 2021-02-14 NOTE — PLAN OF CARE
Problem: Falls - Risk of:  Goal: Will remain free from falls  Description: Will remain free from falls  Outcome: Ongoing  Goal: Absence of physical injury  Description: Absence of physical injury  Outcome: Ongoing     Problem: Discharge Planning:  Goal: Discharged to appropriate level of care  Description: Discharged to appropriate level of care  Outcome: Ongoing     Problem: Gas Exchange - Impaired:  Goal: Levels of oxygenation will improve  Description: Levels of oxygenation will improve  Outcome: Ongoing     Problem: Infection, Septic Shock:  Goal: Will show no infection signs and symptoms  Description: Will show no infection signs and symptoms  Outcome: Ongoing     Problem: Infection - Ventilator-Associated Pneumonia:  Goal: Absence of pulmonary infection  Description: Absence of pulmonary infection  Outcome: Ongoing     Problem: Serum Glucose Level - Abnormal:  Goal: Ability to maintain appropriate glucose levels will improve  Description: Ability to maintain appropriate glucose levels will improve  Outcome: Ongoing     Problem: Tissue Perfusion, Altered:  Goal: Circulatory function within specified parameters  Description: Circulatory function within specified parameters  Outcome: Ongoing     Problem: Venous Thromboembolism:  Goal: Will show no signs or symptoms of venous thromboembolism  Description: Will show no signs or symptoms of venous thromboembolism  Outcome: Ongoing  Goal: Absence of signs or symptoms of impaired coagulation  Description: Absence of signs or symptoms of impaired coagulation  Outcome: Ongoing

## 2021-02-15 LAB
ALBUMIN SERPL-MCNC: 2.9 G/DL (ref 3.5–5.2)
ALP BLD-CCNC: 95 U/L (ref 40–130)
ALT SERPL-CCNC: 59 U/L (ref 5–41)
ANION GAP SERPL CALCULATED.3IONS-SCNC: 11 MMOL/L (ref 7–19)
AST SERPL-CCNC: 53 U/L (ref 5–40)
BASOPHILS ABSOLUTE: 0 K/UL (ref 0–0.2)
BASOPHILS RELATIVE PERCENT: 0.1 % (ref 0–1)
BILIRUB SERPL-MCNC: 0.3 MG/DL (ref 0.2–1.2)
BLOOD CULTURE, ROUTINE: ABNORMAL
BUN BLDV-MCNC: 50 MG/DL (ref 8–23)
CALCIUM SERPL-MCNC: 7.9 MG/DL (ref 8.8–10.2)
CHLORIDE BLD-SCNC: 99 MMOL/L (ref 98–111)
CO2: 24 MMOL/L (ref 22–29)
CREAT SERPL-MCNC: 1.7 MG/DL (ref 0.5–1.2)
CULTURE, BLOOD 2: ABNORMAL
EOSINOPHILS ABSOLUTE: 0 K/UL (ref 0–0.6)
EOSINOPHILS RELATIVE PERCENT: 0 % (ref 0–5)
GFR AFRICAN AMERICAN: 47
GFR NON-AFRICAN AMERICAN: 39
GLUCOSE BLD-MCNC: 245 MG/DL (ref 70–99)
GLUCOSE BLD-MCNC: 246 MG/DL (ref 70–99)
GLUCOSE BLD-MCNC: 256 MG/DL (ref 70–99)
GLUCOSE BLD-MCNC: 285 MG/DL (ref 70–99)
GLUCOSE BLD-MCNC: 301 MG/DL (ref 74–109)
HCT VFR BLD CALC: 36.8 % (ref 42–52)
HEMOGLOBIN: 11.8 G/DL (ref 14–18)
IMMATURE GRANULOCYTES #: 0.3 K/UL
LYMPHOCYTES ABSOLUTE: 0.4 K/UL (ref 1.1–4.5)
LYMPHOCYTES RELATIVE PERCENT: 4.7 % (ref 20–40)
MAGNESIUM: 1.6 MG/DL (ref 1.6–2.4)
MCH RBC QN AUTO: 27.8 PG (ref 27–31)
MCHC RBC AUTO-ENTMCNC: 32.1 G/DL (ref 33–37)
MCV RBC AUTO: 86.6 FL (ref 80–94)
MONOCYTES ABSOLUTE: 0.3 K/UL (ref 0–0.9)
MONOCYTES RELATIVE PERCENT: 3.3 % (ref 0–10)
NEUTROPHILS ABSOLUTE: 8.3 K/UL (ref 1.5–7.5)
NEUTROPHILS RELATIVE PERCENT: 89.1 % (ref 50–65)
ORGANISM: ABNORMAL
ORGANISM: ABNORMAL
PDW BLD-RTO: 12.8 % (ref 11.5–14.5)
PERFORMED ON: ABNORMAL
PLATELET # BLD: 199 K/UL (ref 130–400)
PMV BLD AUTO: 11.5 FL (ref 9.4–12.4)
POTASSIUM SERPL-SCNC: 4.6 MMOL/L (ref 3.5–5)
RBC # BLD: 4.25 M/UL (ref 4.7–6.1)
SODIUM BLD-SCNC: 134 MMOL/L (ref 136–145)
TOTAL PROTEIN: 5.2 G/DL (ref 6.6–8.7)
VANCOMYCIN TROUGH: 16.4 UG/ML (ref 10–20)
WBC # BLD: 9.3 K/UL (ref 4.8–10.8)

## 2021-02-15 PROCEDURE — 83735 ASSAY OF MAGNESIUM: CPT

## 2021-02-15 PROCEDURE — 82947 ASSAY GLUCOSE BLOOD QUANT: CPT

## 2021-02-15 PROCEDURE — 80053 COMPREHEN METABOLIC PANEL: CPT

## 2021-02-15 PROCEDURE — 85025 COMPLETE CBC W/AUTO DIFF WBC: CPT

## 2021-02-15 PROCEDURE — 97116 GAIT TRAINING THERAPY: CPT

## 2021-02-15 PROCEDURE — 6370000000 HC RX 637 (ALT 250 FOR IP): Performed by: INTERNAL MEDICINE

## 2021-02-15 PROCEDURE — 6360000002 HC RX W HCPCS: Performed by: INTERNAL MEDICINE

## 2021-02-15 PROCEDURE — 2580000003 HC RX 258: Performed by: INTERNAL MEDICINE

## 2021-02-15 PROCEDURE — 2700000000 HC OXYGEN THERAPY PER DAY

## 2021-02-15 PROCEDURE — 36415 COLL VENOUS BLD VENIPUNCTURE: CPT

## 2021-02-15 PROCEDURE — 80202 ASSAY OF VANCOMYCIN: CPT

## 2021-02-15 PROCEDURE — 2140000000 HC CCU INTERMEDIATE R&B

## 2021-02-15 PROCEDURE — 97530 THERAPEUTIC ACTIVITIES: CPT

## 2021-02-15 PROCEDURE — 94640 AIRWAY INHALATION TREATMENT: CPT

## 2021-02-15 PROCEDURE — 99233 SBSQ HOSP IP/OBS HIGH 50: CPT | Performed by: INTERNAL MEDICINE

## 2021-02-15 PROCEDURE — 2500000003 HC RX 250 WO HCPCS: Performed by: INTERNAL MEDICINE

## 2021-02-15 RX ORDER — METHYLPREDNISOLONE SODIUM SUCCINATE 40 MG/ML
40 INJECTION, POWDER, LYOPHILIZED, FOR SOLUTION INTRAMUSCULAR; INTRAVENOUS DAILY
Status: COMPLETED | OUTPATIENT
Start: 2021-02-16 | End: 2021-02-17

## 2021-02-15 RX ADMIN — METHYLPREDNISOLONE SODIUM SUCCINATE 40 MG: 40 INJECTION, POWDER, FOR SOLUTION INTRAMUSCULAR; INTRAVENOUS at 00:15

## 2021-02-15 RX ADMIN — DOXYCYCLINE 100 MG: 100 INJECTION, POWDER, LYOPHILIZED, FOR SOLUTION INTRAVENOUS at 13:51

## 2021-02-15 RX ADMIN — TROSPIUM CHLORIDE 20 MG: 20 TABLET, FILM COATED ORAL at 22:26

## 2021-02-15 RX ADMIN — GUAIFENESIN 1200 MG: 600 TABLET, EXTENDED RELEASE ORAL at 08:49

## 2021-02-15 RX ADMIN — BUDESONIDE 500 MCG: 0.5 INHALANT RESPIRATORY (INHALATION) at 18:38

## 2021-02-15 RX ADMIN — GUAIFENESIN 1200 MG: 600 TABLET, EXTENDED RELEASE ORAL at 22:26

## 2021-02-15 RX ADMIN — IPRATROPIUM BROMIDE AND ALBUTEROL SULFATE 1 AMPULE: 2.5; .5 SOLUTION RESPIRATORY (INHALATION) at 01:39

## 2021-02-15 RX ADMIN — SODIUM CHLORIDE: 9 INJECTION, SOLUTION INTRAVENOUS at 01:45

## 2021-02-15 RX ADMIN — LACTULOSE 10 G: 20 SOLUTION ORAL at 08:49

## 2021-02-15 RX ADMIN — METOPROLOL SUCCINATE 100 MG: 50 TABLET, EXTENDED RELEASE ORAL at 08:49

## 2021-02-15 RX ADMIN — ARFORMOTEROL TARTRATE 15 MCG: 15 SOLUTION RESPIRATORY (INHALATION) at 18:39

## 2021-02-15 RX ADMIN — STANDARDIZED SENNA CONCENTRATE 8.6 MG: 8.6 TABLET ORAL at 22:26

## 2021-02-15 RX ADMIN — METHYLPREDNISOLONE SODIUM SUCCINATE 40 MG: 40 INJECTION, POWDER, FOR SOLUTION INTRAMUSCULAR; INTRAVENOUS at 13:51

## 2021-02-15 RX ADMIN — IPRATROPIUM BROMIDE AND ALBUTEROL SULFATE 1 AMPULE: 2.5; .5 SOLUTION RESPIRATORY (INHALATION) at 11:24

## 2021-02-15 RX ADMIN — IPRATROPIUM BROMIDE AND ALBUTEROL SULFATE 1 AMPULE: 2.5; .5 SOLUTION RESPIRATORY (INHALATION) at 18:38

## 2021-02-15 RX ADMIN — BUDESONIDE 500 MCG: 0.5 INHALANT RESPIRATORY (INHALATION) at 07:02

## 2021-02-15 RX ADMIN — DOCUSATE SODIUM 100 MG: 100 CAPSULE, LIQUID FILLED ORAL at 08:49

## 2021-02-15 RX ADMIN — VANCOMYCIN HYDROCHLORIDE 1250 MG: 1 INJECTION, POWDER, LYOPHILIZED, FOR SOLUTION INTRAVENOUS at 06:21

## 2021-02-15 RX ADMIN — CEFEPIME HYDROCHLORIDE 2000 MG: 2 INJECTION, POWDER, FOR SOLUTION INTRAVENOUS at 18:28

## 2021-02-15 RX ADMIN — ATORVASTATIN CALCIUM 20 MG: 20 TABLET, FILM COATED ORAL at 08:49

## 2021-02-15 RX ADMIN — DOXYCYCLINE 100 MG: 100 INJECTION, POWDER, LYOPHILIZED, FOR SOLUTION INTRAVENOUS at 01:00

## 2021-02-15 RX ADMIN — CEFEPIME HYDROCHLORIDE 2000 MG: 2 INJECTION, POWDER, FOR SOLUTION INTRAVENOUS at 05:30

## 2021-02-15 RX ADMIN — IPRATROPIUM BROMIDE AND ALBUTEROL SULFATE 1 AMPULE: 2.5; .5 SOLUTION RESPIRATORY (INHALATION) at 21:53

## 2021-02-15 RX ADMIN — IPRATROPIUM BROMIDE AND ALBUTEROL SULFATE 1 AMPULE: 2.5; .5 SOLUTION RESPIRATORY (INHALATION) at 14:32

## 2021-02-15 RX ADMIN — NIFEDIPINE 60 MG: 60 TABLET, FILM COATED, EXTENDED RELEASE ORAL at 08:49

## 2021-02-15 RX ADMIN — TAMSULOSIN HYDROCHLORIDE 0.4 MG: 0.4 CAPSULE ORAL at 08:49

## 2021-02-15 RX ADMIN — INSULIN LISPRO 2 UNITS: 100 INJECTION, SOLUTION INTRAVENOUS; SUBCUTANEOUS at 13:01

## 2021-02-15 RX ADMIN — IPRATROPIUM BROMIDE AND ALBUTEROL SULFATE 1 AMPULE: 2.5; .5 SOLUTION RESPIRATORY (INHALATION) at 07:02

## 2021-02-15 RX ADMIN — INSULIN LISPRO 2 UNITS: 100 INJECTION, SOLUTION INTRAVENOUS; SUBCUTANEOUS at 22:27

## 2021-02-15 RX ADMIN — ASPIRIN 81 MG: 81 TABLET, COATED ORAL at 08:49

## 2021-02-15 RX ADMIN — ARFORMOTEROL TARTRATE 15 MCG: 15 SOLUTION RESPIRATORY (INHALATION) at 07:02

## 2021-02-15 RX ADMIN — PANTOPRAZOLE SODIUM 40 MG: 40 TABLET, DELAYED RELEASE ORAL at 08:49

## 2021-02-15 RX ADMIN — INSULIN LISPRO 3 UNITS: 100 INJECTION, SOLUTION INTRAVENOUS; SUBCUTANEOUS at 08:50

## 2021-02-15 RX ADMIN — SODIUM CHLORIDE: 9 INJECTION, SOLUTION INTRAVENOUS at 13:01

## 2021-02-15 RX ADMIN — INSULIN LISPRO 2 UNITS: 100 INJECTION, SOLUTION INTRAVENOUS; SUBCUTANEOUS at 18:29

## 2021-02-15 RX ADMIN — ENOXAPARIN SODIUM 40 MG: 40 INJECTION SUBCUTANEOUS at 22:26

## 2021-02-15 NOTE — PROGRESS NOTES
Physical Therapy  Name: Rochelle Gonzalez  MRN:  [id number]  Date of service:  2/15/2021     02/15/21 0847   Restrictions/Precautions   Restrictions/Precautions Fall Risk   Required Braces or Orthoses? No   General   Chart Reviewed Yes   Subjective   Subjective Pt agrees to work with therapy, declines getting up to recliner at this time as it hurt his back. Pain Screening   Patient Currently in Pain Denies   Intervention List Patient able to continue with treatment   Oxygen Therapy   O2 Device Nasal cannula   O2 Flow Rate (L/min) 6 L/min   Bed Mobility   Supine to Sit Stand by assistance   Transfers   Sit to Stand Contact guard assistance   Stand to sit Contact guard assistance   Comment Pt sits EOB x15 min SBA, stands twice from bed   Ambulation   Ambulation? Yes   Ambulation 1   Surface level tile   Device Rolling Walker   Other Apparatus O2   Assistance Contact guard assistance   Quality of Gait steady with RW   Gait Deviations Slow Nae;Decreased step length;Decreased step height   Distance 10' x2   Comments Pt able to amb around bed, sit d/t SOB, then amb back around bed   Short term goals   Time Frame for Short term goals 2 wks   Short term goal 1 supine to sit indep   Short term goal 2 sit to stand indep   Short term goal 3 amb. 150' with RW SBA   Short term goal 4 bed to chair SBA   Conditions Requiring Skilled Therapeutic Intervention   Body structures, Functions, Activity limitations Decreased functional mobility ; Decreased endurance;Decreased posture   Assessment Pt able to sit EOB unsupported by therapist, able to stand from bed and amb to other side, but then requires seated rest break d/t SOB. Able to then stand and amb back around bed, less SOB noted with this bout. Pt left sitting EOB with RN administering meds. Activity Tolerance   Activity Tolerance Patient limited by endurance; Patient Tolerated treatment well   Safety Devices   Type of devices Gait belt;Left in bed  (sitting EOB with RN)

## 2021-02-15 NOTE — PROGRESS NOTES
Pharmacy Vancomycin Consult     Vancomycin Day: 4  Current Dosing: Pulse dosing    Temp max:  98.5    Recent Labs     02/13/21  0356 02/14/21  0047   BUN 50* 55*       Recent Labs     02/13/21  0356 02/14/21  0047   CREATININE 2.1* 2.1*       Recent Labs     02/14/21  0047 02/15/21  0306   WBC 6.8 9.3         Intake/Output Summary (Last 24 hours) at 2/15/2021 0445  Last data filed at 2/15/2021 0321  Gross per 24 hour   Intake 2970 ml   Output 1250 ml   Net 1720 ml       Culture Date Source Results   02/12/21 Blood Gram+ cocci  Staph coag-  Staph epidermidis   02/13/21 Blood No growth            Ht Readings from Last 1 Encounters:   02/12/21 6' (1.829 m)        Wt Readings from Last 1 Encounters:   02/14/21 238 lb 1 oz (108 kg)         Body mass index is 32.29 kg/m². Estimated Creatinine Clearance: 36 mL/min (A) (based on SCr of 2.1 mg/dL (H)).     Trough: 16.4    Assessment/Plan: Give Vancomycin 1250mg x1 dose today with random level in the morning    Electronically signed by KASANDRA Beckman [facility] on 2/15/2021 at 4:45 AM

## 2021-02-15 NOTE — PROGRESS NOTES
Cardiology Progress Note   Dennis Dawson, APRN - NP      Patient:  Benson Amaro  [id number]     Patient Active Problem List    Diagnosis Date Noted    Sepsis ([facility] [address] 02/12/2021     Priority: Low    Low back pain 01/22/2021     Priority: Low    Acute respiratory failure ([address] 01/21/2021     Priority: Low    Type 2 diabetes mellitus with complication, without long-term current use of insulin ([address] 01/21/2021     Priority: Low    Weakness 01/21/2021     Priority: Low    Other dysphagia 01/21/2021     Priority: Low    Severe sepsis ([address] 01/18/2021     Priority: Low    Acute on chronic respiratory failure ([address] 01/18/2021     Priority: Low    UTI (urinary tract infection) 01/18/2021     Priority: Low    Acute on chronic kidney failure ([address] 01/18/2021     Priority: Low    Hypoxemia 01/18/2021     Priority: Low    Metabolic acidosis 09/84/5512     Priority: Low    Acidosis, metabolic, with respiratory acidosis 01/18/2021     Priority: Low    History of bladder cancer 06/15/2020     Priority: Low    Bladder cancer ([address] 12/19/2018     Priority: Low    Postoperative pain 12/19/2018     Priority: Low    Epistaxis 11/06/2018     Priority: Low    Acute blood loss anemia 11/06/2018     Priority: Low    Melena 11/06/2018     Priority: Low    Hypocalcemia 11/06/2018     Priority: Low    Pneumonia due to infectious organism 11/06/2018     Priority: Low    Bleeding diathesis ([address] 11/05/2018     Priority: Low    BPH associated with nocturia      Priority: Low    Acute renal failure (HCC)      Priority: Low    Shock liver      Priority: Low    Acute liver failure without hepatic coma 10/23/2018     Priority: Low    CHF (congestive heart failure) ([address] 10/23/2018     Priority: Low    Bilateral pleural effusion 10/23/2018     Priority: Low    Palliative care patient 10/23/2018     Priority: Low    Arthritis of knee 10/15/2018     Priority: Low    Essential hypertension 10/15/2018 aspirin 81 MG EC tablet Take 1 tablet by mouth daily 1/30/21   Geovany Mendez MD   glipiZIDE (GLUCOTROL) 5 MG tablet Take 1 tablet by mouth every morning (before breakfast) 1/30/21   Geovany Mendez MD   atorvastatin (LIPITOR) 20 MG tablet Take 1 tablet by mouth daily 1/29/21   Geovany Mendez MD   metoprolol succinate (TOPROL XL) 100 MG extended release tablet Take 1 tablet by mouth daily 1/30/21   Geovany Mendez MD   NIFEdipine (ADALAT CC) 60 MG extended release tablet Take 1 tablet by mouth daily 1/30/21   Geovany Mendez MD   tamsulosin (FLOMAX) 0.4 MG capsule Take 1 capsule by mouth daily 1/30/21   Geovany Mendez MD   pantoprazole (PROTONIX) 40 MG tablet Take 1 tablet by mouth daily 1/30/21   Geovany Mendez MD   trospium (SANCTURA) 20 MG tablet Take 1 tablet by mouth nightly 1/29/21   Geovany Mendez MD        methylPREDNISolone  40 mg Intravenous Q12H    docusate sodium  100 mg Oral Daily    senna  1 tablet Oral Nightly    guaiFENesin  1,200 mg Oral BID    budesonide  0.5 mg Nebulization BID    Arformoterol Tartrate  15 mcg Nebulization BID    cefepime  2,000 mg Intravenous Q12H    vancomycin (VANCOCIN) intermittent dosing (placeholder)   Other RX Placeholder    aspirin  81 mg Oral Daily    atorvastatin  20 mg Oral Daily    metoprolol succinate  100 mg Oral Daily    NIFEdipine  60 mg Oral Daily    pantoprazole  40 mg Oral Daily    tamsulosin  0.4 mg Oral Daily    trospium  20 mg Oral Nightly    sodium chloride flush  10 mL Intravenous 2 times per day    enoxaparin  40 mg Subcutaneous Q24H    insulin lispro  0-6 Units Subcutaneous TID     insulin lispro  0-3 Units Subcutaneous Nightly    ipratropium-albuterol  1 ampule Inhalation Q4H    doxycycline (VIBRAMYCIN) IV  100 mg Intravenous Q12H       TELEMETRY: Sinus     Physical Exam:    Physical Exam  HENT:      Mouth/Throat:      Mouth: Mucous membranes are moist.   Eyes:      Pupils: Pupils are equal, round, and reactive to light. Cardiovascular:      Rate and Rhythm: Normal rate and regular rhythm. Pulses: Normal pulses. Heart sounds: Normal heart sounds. Pulmonary:      Effort: Pulmonary effort is normal.      Breath sounds: Rales (bilateral) present. Skin:     General: Skin is warm. Neurological:      Mental Status: He is alert and oriented to person, place, and time. Psychiatric:         Mood and Affect: Mood normal.         Behavior: Behavior normal.       Lab Data:  CBC:   Recent Labs     02/13/21  0356 02/14/21  0047 02/15/21  0306   WBC 4.2* 6.8 9.3   HGB 13.2* 12.8* 11.8*   HCT 41.9* 39.8* 36.8*   MCV 89.1 87.1 86.6    179 199     BMP:   Recent Labs     02/13/21  0356 02/14/21  0047 02/15/21  0306   * 132* 134*   K 4.2 4.1 4.6   CL 96* 96* 99   CO2 24 26 24   BUN 50* 55* 50*   CREATININE 2.1* 2.1* 1.7*     LIVER PROFILE:   Recent Labs     02/12/21  1546 02/14/21  0047 02/15/21  0306   AST 47* 52* 53*   ALT 30 42* 59*   BILITOT 0.4 0.3 0.3   ALKPHOS 130 101 95     PT/INR: No results for input(s): PROTIME, INR in the last 72 hours. APTT: No results for input(s): APTT in the last 72 hours. CK, CKMB, Troponin:   Recent Labs     02/13/21  1302 02/13/21  1805 02/14/21  0047   TROPONINI 0.53* 0.51* 0.48*     Results for Esme Calvo (MRN [id number]) as of 2/15/2021 15:52   Ref.  Range 10/23/2018 04:20 1/17/2021 22:42 2/12/2021 15:46   Pro-BNP Latest Ref Range: 0 - 1,800 pg/mL 22,261 (H) 7,756 (H) 3,526 (H)         IMAGING:  Echo Complete 2d W Doppler W Color    Result Date: 2/14/2021 LA: 3.8 cm                                       LVOT: 2.2 cm  Doppler Measurements:   AV Peak Gradient: 9.73 mmHg        MV Peak E-Wave: 107 cm/s                                     MV Peak A-Wave: 97.3 cm/s  TR Velocity:197 cm/s               MV E/A Ratio: 1.1 %  TR Gradient:15.52 mmHg             MV Peak Gradient: 4.58 mmHg  Estimated RAP:5 mmHg               MV P1/2t: 47 msec  RVSP:21 mmHg                       MVA by PHT4.68 cm^2      Ct Head Wo Contrast    Result Date: 1/18/2021  Examination. CT HEAD WO CONTRAST 1/17/2021 10:14 PM History: Altered mental status. DLP: 886 mGycm. The CT scan of the head is performed without intravenous contrast enhancement. The images are acquired in axial plane with subsequent reconstruction in coronal and sagittal planes. The comparison is made with the previous study dated 8/15/2018. There is no evidence of a mass or midline shift. There is no evidence of intracranial hemorrhage or hematoma. Moderately prominent ventricles, basal cistern and the cortical sulci are age-appropriate. There are scattered areas of chronic white matter ischemia in the centrum semiovale bilaterally, similar to the previous study. The gray-white matter differentiation is maintained. A partially empty sella turcica is seen. The images reviewed in bone window show no acute bony abnormality. Visualized paranasal sinuses and mastoid air cells. No acute intracranial abnormality. The above study was initially reviewed and reported by [facility]. I do not find any discrepancies.  Signed by Dr Mary Jo Balderas on 1/18/2021 6:47 AM    Ct Chest Wo Contrast    Result Date: 2/13/2021 EXAMINATION: CT CHEST WO CONTRAST 2/13/2021 2:17 PM HISTORY: Shortness of breath, bilateral pneumonia. DOSE: 976 mGycm. Automatic exposure control was utilized in an effort to use as little radiation as possible, without compromising image quality. REPORT: Spiral CT of the chest was performed without contrast, reconstructed coronal and sagittal images are also reviewed. COMPARISON: Portable chest x-ray to 12/20/2021. Review of lung windows demonstrates consolidative infiltrates in both lungs, on the right, this includes extensive infiltrate surrounding the right hilum, with contiguous infiltrate in the right upper lobe, with air bronchograms, there is also consolidation of the right lower lobe with air bronchograms and focal subpleural nodular infiltrates are identified in the right middle lobe and right upper lobe focally. In the left lung, there is a subpleural infiltrate in the left upper lobe along the major fissure, with mild paraseptal emphysematous changes. This is contiguous with mixed groundglass and consolidative infiltrates in the lingula and there is consolidation of the left lower lobe with air bronchograms, with associated bronchial wall thickening. A small amount of left perihilar infiltrate is also present. No pneumothorax is identified, there is a trace left pleural effusion, small right pleural effusion. No lung abscess or cavitation is seen in association with the areas of pneumonia. Soft tissue windows show a 12 mm low-attenuation lesion in the right thyroid lobe, which is most likely benign. There are normal-sized shotty appearing lymph nodes within the mediastinum without measurable lymphadenopathy. A small amount calcified plaques noted within the aortic arch and there is heavy calcified plaque within the coronary arteries. Heart size is normal. There is trace pericardial fluid. The visualized upper abdomen shows no acute abnormality. There is mild bilateral gynecomastia.  Review of bone Bilateral renal cysts as detailed above, with a benign appearance. Some smaller cysts seen in the kidneys on previous CT are not visualized on today's examination No hydronephrosis is identified. Both kidneys have normal cortical echogenicity. The bladder is unremarkable. Signed by Dr Jarod Moser. Holly on 2/13/2021 3:21 PM    Xr Chest Portable    Result Date: 2/12/2021  EXAMINATION: XR CHEST PORTABLE 2/12/2021 4:13 PM HISTORY: Shortness of breath, hypoxia, recent pneumonia COMPARISON: 1/18/2021 FINDINGS: The heart appears normal in size. Atherosclerotic calcifications are seen in the aorta. Bilateral airspace opacities are present with relative sparing of the upper lobes, markedly increased compared to the prior exam. There is no appreciable pneumothorax or definite pleural effusion. Bilateral pneumonia, though worse when compared to the prior exam. Follow-up PA and lateral chest radiograph to recommended in 6-8 weeks to document resolution. Signed by Dr Rousseau [facility] on 2/12/2021 4:14 PM    Xr Chest Portable    Result Date: 1/18/2021  EXAMINATION:  XR CHEST PORTABLE  1/18/2021 7:14 AM HISTORY: NG tube placement. Shortness of air. COMPARISON: 1/18/2021 at 0139 hours. FINDINGS:  There is hypoventilation. The patient is intubated. An NG tube extends to the stomach. Bilateral infiltrates are unchanged. Heart size upper limits of normal.    1. New NG tube in good position. Patient remains intubated. 2. Bilateral infiltrates without significant change. Signed by Dr Obdulio Crawley on 1/18/2021 7:16 AM    Xr Chest Portable    Result Date: 1/18/2021  EXAMINATION:  XR CHEST PORTABLE  1/18/2021 7:13 AM HISTORY: Shortness of breath. COMPARISON: 7/31/2020. FINDINGS:  There is hypoventilation. There is perihilar infiltrate. There is more dense infiltrate at the right lung base. No significant pleural effusion is seen. Heart size is upper limits of normal. The thoracic aorta is atheromatous. 1. Hypoventilation. 2. Bilateral infiltrates are likely infectious/inflammatory. Signed by Dr Jarrett Akers on 1/18/2021 7:14 AM    Xr Chest Portable    Result Date: 1/18/2021  EXAMINATION:  XR CHEST PORTABLE  1/18/2021 7:12 AM HISTORY: Patient intubated. Bilateral infiltrates. COMPARISON: 1/17/2021 at 2301 hours. FINDINGS:  The patient is now intubated with endotracheal tube tip at the T4 level. There is hypoventilation. There are perihilar opacities with more dense consolidation at the right lung base. Heart size is upper limits of normal. No acute bony abnormality is seen. 1. Endotracheal tube tip at the T4 level. 2. Bilateral infiltrates without significant change. Signed by Dr Jarrett Akers on 1/18/2021 7:13 AM    2D ECHO 2/14/2021  Moderate mitral valve regurg. EF 60 to 65%. Grade 2 diastolic dysfunction. Assessment and Plan:    42-year-old male who was admitted with shortness of breath and fatigue, diagnosed with pneumonia was found to have mild elevated BNP and troponin   elevated troponin -  0.48 currently; 0.51; 0.53; visit troponin most likely due to his renal failure   Patient currently without any complaints of chest pain. This could be demand ischemia, will defer ischemic work-up till his pneumonia resolves and will do it as outpatient. Patient symptomatically stable  Continue with medical therapy including aspirin statin and beta-blocker  Last echo reviewed.     - Recurrent Bilateral pneumonia as shown in his chest CT -currently on IV antibiotics, he is improving, ID following his progress      Franchesca Marie MD 2/15/2021 10:50 AM        Physician's attestation/substantial contribution: I, Dr. José Lorenzo, independently performed an evaluation on Mr Leni Rucker. I have reviewed relevant medical information/data to include but not limited to medication list, relevant appropriate labs and imaging when applicable. I reviewed other physician's notes, ancillary services and nurses assessment. I have reviewed the above documentation completed by the Nurse Practitioner . Please see my additional contributions to the history of present illness, physical examination, and assessment/medical decision-making that reflect my findings and impressions. I discussed essential elements of the care plan with the NP and the patient as well. I answered all the questions to the patient's satisfaction. I concur with above stated.        Elza Multani MD, [address] [facility]  Interventional Cardiologist, Endovascular Specialist   Medical Director, Structural Heart Program   [facility]

## 2021-02-15 NOTE — PROGRESS NOTES
INFECTIOUS DISEASES PROGRESS NOTE    Patient:  Lan Stroud  YOB: 1940  MRN: [id number]   Admit date: 2/12/2021   Admitting Physician: Selina Crawford MD  Primary Care Physician: Omer Lentz MD    CHIEF COMPLAINT:   \"Doing better\"       Interval History: Patient reports that he set up on the side of the bed and walked to the door and back with physical therapy earlier. O2 requirements have improved    Reviewed with the patient again that he felt like he was improving until about 2 days after he left a floor rehab. He commented about how confusing his medications were at the time of discharge and he was not sure what medications he was supposed to take. He said it took about 4 days to sort out what medicines were new compared to what Dr. Beverley David previously given him etc.      Allergies: Allergies   Allergen Reactions    Eliquis [Apixaban] Other (See Comments)     \"almost bled to death\"    Promethazine Hcl Other (See Comments)     He became encephalopathic for several hours and was not responsive.        Current Meds:     methylPREDNISolone sodium (SOLU-MEDROL) injection 40 mg, Q12H      guaiFENesin (ROBITUSSIN) 100 MG/5ML syrup 200 mg, Q4H PRN      Benzocaine-Menthol (CEPACOL) 1 lozenge, Q2H PRN      phenol 1.4 % mouth spray 1 spray, Q2H PRN      docusate sodium (COLACE) capsule 100 mg, Daily      senna (SENOKOT) tablet 8.6 mg, Nightly      lactulose (CHRONULAC) 10 GM/15ML solution 10 g, TID PRN      bisacodyl (DULCOLAX) suppository 10 mg, Daily PRN      guaiFENesin (MUCINEX) extended release tablet 1,200 mg, BID      budesonide (PULMICORT) nebulizer suspension 500 mcg, BID      Arformoterol Tartrate (BROVANA) nebulizer solution 15 mcg, BID      0.9 % sodium chloride infusion, Continuous      ceFEPIme (MAXIPIME) 2,000 mg in sterile water 20 mL IV syringe, Q12H      vancomycin (VANCOCIN) intermittent dosing (placeholder), RX Placeholder      aspirin EC tablet 81 mg, Daily   atorvastatin (LIPITOR) tablet 20 mg, Daily      metoprolol succinate (TOPROL XL) extended release tablet 100 mg, Daily      NIFEdipine (PROCARDIA XL) extended release tablet 60 mg, Daily      pantoprazole (PROTONIX) tablet 40 mg, Daily      tamsulosin (FLOMAX) capsule 0.4 mg, Daily      trospium (SANCTURA) tablet 20 mg, Nightly      sodium chloride flush 0.9 % injection 10 mL, 2 times per day      sodium chloride flush 0.9 % injection 10 mL, PRN      enoxaparin (LOVENOX) injection 40 mg, Q24H      polyethylene glycol (GLYCOLAX) packet 17 g, Daily PRN      acetaminophen (TYLENOL) tablet 650 mg, Q6H PRN    Or      acetaminophen (TYLENOL) suppository 650 mg, Q6H PRN      insulin lispro (HUMALOG) injection vial 0-6 Units, TID WC      insulin lispro (HUMALOG) injection vial 0-3 Units, Nightly      ipratropium-albuterol (DUONEB) nebulizer solution 1 ampule, Q4H      doxycycline (VIBRAMYCIN) 100 mg in dextrose 5 % 100 mL IVPB, Q12H      glucose (GLUTOSE) 40 % oral gel 15 g, PRN      dextrose 50 % IV solution, PRN      glucagon (rDNA) injection 1 mg, PRN      dextrose 5 % solution, PRN        Review of Systems   General: No fever  Respiratory: Short of breath. Still with nonproductive orbitally productive cough    Vital Signs:  /70   Pulse 71   Temp 97.2 °F (36.2 °C) (Temporal)   Resp 18   Ht 6' (1.829 m)   Wt 238 lb 0.7 oz (108 kg)   SpO2 95%   BMI 32.28 kg/m²   Temp (24hrs), Av.7 °F (36.5 °C), Min:97.2 °F (36.2 °C), Max:98 °F (36.7 °C)      Physical Exam  General: Patient is an elderly male lying in bed flat on his back in no acute distress  HEENT: Sclera anicteric. O2 per nasal cannula in place.   Located patient's mask and he placed it over his nose and mouth  Lungs: Diminished breath sounds bilaterally right greater than left  Abdomen: Soft, nontender, nondistended, bowel sounds are positive  Neuro: Alert and oriented, speech clear  Psych: Pleasant cooperative LAB RESULTS:    CBC with DIFF:  Recent Labs     02/13/21  0356 02/14/21  0047 02/15/21  0306   WBC 4.2* 6.8 9.3   RBC 4.70 4.57* 4.25*   HGB 13.2* 12.8* 11.8*   HCT 41.9* 39.8* 36.8*   MCV 89.1 87.1 86.6   MCH 28.1 28.0 27.8   MCHC 31.5* 32.2* 32.1*   RDW 13.1 12.7 12.8    179 199   MPV 11.1 11.3 11.5   NEUTOPHILPCT 86.3* 88.9* 89.1*   LYMPHOPCT 8.9* 6.3* 4.7*   MONOPCT 2.6 3.5 3.3   EOSRELPCT 0.0 0.0 0.0   BASOPCT 0.5 0.1 0.1   NEUTROABS 3.6 6.1 8.3*   LYMPHSABS 0.4* 0.4* 0.4*   MONOSABS 0.10 0.20 0.30   EOSABS 0.00 0.00 0.00   BASOSABS 0.00 0.00 0.00       CMP/BMP:  Recent Labs     02/12/21  1546 02/12/21  1546 02/13/21 0356 02/14/21  0047 02/15/21  0306   *  --  132* 132* 134*   K 3.9   < > 4.2 4.1 4.6   CL 93*  --  96* 96* 99   CO2 28  --  24 26 24   ANIONGAP 12  --  12 10 11   GLUCOSE 251*  --  205* 243* 301*   BUN 49*  --  50* 55* 50*   CREATININE 2.2*  --  2.1* 2.1* 1.7*   LABGLOM 29*  --  31* 31* 39*   CALCIUM 9.0  --  8.1* 8.5* 7.9*   PROT 6.3*  --   --  5.7* 5.2*   LABALBU 3.5  --   --  2.8* 2.9*   BILITOT 0.4  --   --  0.3 0.3   ALKPHOS 130  --   --  101 95   ALT 30  --   --  42* 59*   AST 47*  --   --  52* 53*    < > = values in this interval not displayed.                  Respiratory Panel, Molecular, with COVID-19 (Restricted: peds pts or suitable admitted adults) [[id number]] Collected: 02/12/21 1615   Order Status: Completed Specimen: Nasopharyngeal Updated: 02/12/21 1715     Adenovirus by PCR Not Detected     Bordetella parapertussis by PCR Not Detected     Bordetella pertussis by PCR Not Detected     Chlamydophilia pneumoniae by PCR Not Detected     Coronavirus 229E by PCR Not Detected     Coronavirus HKU1 by PCR Not Detected     Coronavirus NL63 by PCR Not Detected     Coronavirus OC43 by PCR Not Detected     SARS-CoV-2, PCR Not Detected     Comment: This test has been authorized by FDA under an   Emergency Use Authorization (EUA). This test is only authorized for the duration of the   time of declaration that circumstances exist justifying the   authorization of the emergency use of in vitro diagnostic   testing for detection of the SARS-CoV-2 virus   and/or diagnosis of COVID-19 infection under   section 564 (b)(1) of the Act, 21 U. S.C. 892JQJ-2 (b) (1),   unless the authorization is terminated or revoked sooner. Patient Fact SettlementContracts.gl   Provider Fact SettlementContracts.gl     METHODOLOGY: Multiplex PCR         Human Metapneumovirus by PCR Not Detected     Human Rhinovirus/Enterovirus by PCR Not Detected     Influenza A by PCR Not Detected     Influenza B by PCR Not Detected     Mycoplasma pneumoniae by PCR Not Detected     Parainfluenza Virus 1 by PCR Not Detected     Parainfluenza Virus 2 by PCR Not Detected     Parainfluenza Virus 3 by PCR Not Detected     Parainfluenza Virus 4 by PCR Not Detected     Respiratory Syncytial Virus by PCR                     IMAGING:                   Culture, Blood 2 [[id number]] Collected: 02/14/21 0706   Order Status: Completed Specimen: Blood Updated: 02/15/21 0714    Culture, Blood 2 No Growth to date.  Any change in status will be called. Narrative:     ORDER#: [id number]                          ORDERED BY: Yadira Hernandez   SOURCE: Blood Hand, Right                  COLLECTED:  02/14/21 07:06   ANT[facility]OTICS AT [facility]. :                      RECEIVED :  02/14/21 07:10   Culture, Blood 1 [[id number]] Collected: 02/14/21 0609   Order Status: Completed Specimen: Blood Updated: 02/15/21 0714    Blood Culture, Routine No Growth to date.  Any change in status will be called. Narrative:     ORDER#: [id number]                          ORDERED BY: Yadira Hernandez   SOURCE: Blood Antecubital-Lef              COLLECTED:  02/14/21 06:09   ANT[facility]ICS AT [facility] :                      RECEIVED :  02/14/21 06:27 Culture, Blood 1 [[id number]] (Abnormal) Collected: 02/12/21 1546   Order Status: Completed Specimen: Blood Updated: 02/15/21 2786    Organism Staphylococcus coagulase-negativeAbnormal     Blood Culture, Routine --    Isolated from Aerobic bottle   No further workup   Refer to Blood Culture drawn on 2/12/21 at 16:15 for complete results    Narrative:     ORDER#: [id number]                          ORDERED BY: CELSA Can   SOURCE: Blood Antecubital-Rig              COLLECTED:  02/12/21 15:46   ANT[facility]OTICS AT [facility] :                      RECEIVED :  02/12/21 15:55   CALL  [facility]  [facility] tel. ,   Microbiology results called to and read back by Reji Howell RN , 7th,   02/13/2021 12:56, by [facility]   Culture, Blood 2 [[id number]] (Abnormal)  Collected: 02/12/21 1615   Order Status: Completed Specimen: Blood Updated: 02/15/21 0633    Organism Staphylococcus epidermidisAbnormal     Culture, Blood 2 Isolated from Aerobic bottle   Narrative:     ORDER#: [id number]                          ORDERED BY: CELSA Can   SOURCE: Blood Antecubital-Rig              COLLECTED:  02/12/21 16:15   ANTIBIOTICS AT [facility] :                      RECEIVED :  02/12/21 16:21   CALL  [facility]  [facility] tel. ,   Microbiology results called to and read back by Reji Howell RN 7th, 02/13/2021            RADIOLOGY      Ct Chest Wo Contrast    Result Date: 2/13/2021 EXAMINATION: CT CHEST WO CONTRAST 2/13/2021 2:17 PM HISTORY: Shortness of breath, bilateral pneumonia. DOSE: 976 mGycm. Automatic exposure control was utilized in an effort to use as little radiation as possible, without compromising image quality. REPORT: Spiral CT of the chest was performed without contrast, reconstructed coronal and sagittal images are also reviewed. COMPARISON: Portable chest x-ray to 12/20/2021. Review of lung windows demonstrates consolidative infiltrates in both lungs, on the right, this includes extensive infiltrate surrounding the right hilum, with contiguous infiltrate in the right upper lobe, with air bronchograms, there is also consolidation of the right lower lobe with air bronchograms and focal subpleural nodular infiltrates are identified in the right middle lobe and right upper lobe focally. In the left lung, there is a subpleural infiltrate in the left upper lobe along the major fissure, with mild paraseptal emphysematous changes. This is contiguous with mixed groundglass and consolidative infiltrates in the lingula and there is consolidation of the left lower lobe with air bronchograms, with associated bronchial wall thickening. A small amount of left perihilar infiltrate is also present. No pneumothorax is identified, there is a trace left pleural effusion, small right pleural effusion. No lung abscess or cavitation is seen in association with the areas of pneumonia. Soft tissue windows show a 12 mm low-attenuation lesion in the right thyroid lobe, which is most likely benign. There are normal-sized shotty appearing lymph nodes within the mediastinum without measurable lymphadenopathy. A small amount calcified plaques noted within the aortic arch and there is heavy calcified plaque within the coronary arteries. Heart size is normal. There is trace pericardial fluid. The visualized upper abdomen shows no acute abnormality. There is mild bilateral gynecomastia.  Review of bone windows shows advanced degenerative changes in the visualized upper lumbar spine. Consolidating infiltrates within both lungs, greatest in the right lower lobe and most compatible with bilateral pneumonia, there is a small right pleural effusion and trace left pleural effusion. No pneumothorax is identified. There is no lung abscess or areas of cavitation. Mild underlying changes of paraseptal emphysema are noted. No measurable intrathoracic lymphadenopathy is identified. Signed by Dr Sana Barrera on 2/13/2021 2:28 PM    Us Renal Complete    Result Date: 2/13/2021  EXAMINATION: US RENAL COMPLETE 2/13/2021 3:16 PM HISTORY: Acute kidney injury. Report: Sonographic images of the kidneys were obtained. COMPARISON: CT of the abdomen and pelvis with without contrast 7/31/2020. Ultrasound of the kidneys 10/23/2018. The right kidney measures 11.6 x 5.5 x 6.0 cm and has normal cortical echogenicity, with a cortical thickness that averages 12 mm. No solid mass is identified. There are 2 cysts in the right kidney appear benign, including one at the interpolar level measures 1.3 x 1.4 x 1.0 cm (previous measurement of 0.9 x 0.8 x 0.9 cm) and one at the inferior pole that measures 2.5 x 2.9 x 2.1 cm. This cyst previously measured 2.7 x 2.2 x 2.0 cm and is essentially unchanged Color Doppler images demonstrate vascular flow within the right kidney. The left kidney measures 10.4 x 4.5 x 4.0 cm and has normal morphology and cortical echogenicity without evidence of hydronephrosis or solid mass. There is a benign-appearing cyst at the inferior pole that measures 1 cm, not clearly seen on the previous ultrasound. Color Doppler images demonstrate vascular flow within the left kidney. Additional small cysts are seen in the kidneys on prior CT but poorly visualized on today's examination. The bladder is within normal limits. Bilateral renal cysts as detailed above, with a benign appearance. Some smaller cysts seen in the kidneys on previous CT are not visualized on today's examination No hydronephrosis is identified. Both kidneys have normal cortical echogenicity. The bladder is unremarkable. Signed by Dr Jay Spivey. Holly on 2/13/2021 3:21 PM    Xr Chest Portable    Result Date: 2/12/2021  EXAMINATION: XR CHEST PORTABLE 2/12/2021 4:13 PM HISTORY: Shortness of breath, hypoxia, recent pneumonia COMPARISON: 1/18/2021 FINDINGS: The heart appears normal in size. Atherosclerotic calcifications are seen in the aorta. Bilateral airspace opacities are present with relative sparing of the upper lobes, markedly increased compared to the prior exam. There is no appreciable pneumothorax or definite pleural effusion. Bilateral pneumonia, though worse when compared to the prior exam. Follow-up PA and lateral chest radiograph to recommended in 6-8 weeks to document resolution.  Signed by Dr Lauren Ames on 2/12/2021 4:14 PM                  Patient Active Problem List   Diagnosis    Diverticulitis of large intestine with perforation without bleeding    Generalized abdominal pain    Colonic diverticular abscess    Bilateral carotid artery stenosis    Atherosclerosis of native artery of both lower extremities with intermittent claudication ([address]    Carotid stenosis, asymptomatic, left    Primary osteoarthritis of left knee    Arthritis of knee    Essential hypertension    Pure hypercholesterolemia    Slow transit constipation    Iron deficiency anemia    GERD (gastroesophageal reflux disease)    Acute liver failure without hepatic coma    CHF (congestive heart failure) (HCC)    Bilateral pleural effusion    Palliative care patient    Shock liver    Acute renal failure ([address]    BPH associated with nocturia    Bleeding diathesis ([address]    Epistaxis    Acute blood loss anemia    Melena    Hypocalcemia  Pneumonia due to infectious organism    Bladder cancer [facility]    Postoperative pain    History of bladder cancer    Severe sepsis (HCC)    Acute on chronic respiratory failure (HCC)    UTI (urinary tract infection)    Acute on chronic kidney failure (HCC)    Hypoxemia    Metabolic acidosis    Acidosis, metabolic, with respiratory acidosis    Acute respiratory failure (HCC)    Type 2 diabetes mellitus with complication, without long-term current use of insulin (HCC)    Weakness    Other dysphagia    Low back pain    Sepsis (HCC)       IMPRESSION:  Positive blood cultures - Staph epi in one bottle of one set and second set - drawn in ED with difficulty per patient - also one bottle of one set - referred to other ( ie not identified)- Suspect drawn from right antecubital iv site in ED - contaminant. Not consistent with pulmonary pathogen     Bilateral pneumonia right greater than left (patient had bilateral infiltrates right greater than left on chest x-ray during his January admission)      RECOMMENDATIONS :  Continue Mucinex to see if patient can become productive of sputum and we can collect specimens  contiunue broad abx  Sputum for gram stain and culture  Sputum for KOH and fungal culture  If no information from that, may need bronch if O2 stable enough  Wean steroids?         Elmer Paige MD

## 2021-02-15 NOTE — PROGRESS NOTES
 docusate sodium (COLACE) capsule 100 mg  100 mg Oral Daily Missael Akins MD   100 mg at 02/15/21 0849    senna (SENOKOT) tablet 8.6 mg  1 tablet Oral Nightly Missael Akins MD   8.6 mg at 02/14/21 2212    lactulose (CHRONULAC) 10 GM/15ML solution 10 g  10 g Oral TID PRN Missael Akins MD   10 g at 02/15/21 0849    bisacodyl (DULCOLAX) suppository 10 mg  10 mg Rectal Daily PRN Missael Akins MD        guaiFENesin [facility]) extended release tablet 1,200 mg  1,200 mg Oral BID Nakul Pace MD   1,200 mg at 02/15/21 0849    budesonide (PULMICORT) nebulizer suspension 500 mcg  0.5 mg Nebulization BID Missael Akins MD   500 mcg at 02/15/21 0862    Arformoterol Tartrate (BROVANA) nebulizer solution 15 mcg  15 mcg Nebulization BID Missael Akins MD   15 mcg at 02/15/21 9623    0.9 % sodium chloride infusion   Intravenous Continuous Missael Akins  mL/hr at 02/15/21 1301 New Bag at 02/15/21 1301    ceFEPIme (MAXIPIME) 2,000 mg in sterile water 20 mL IV syringe  2,000 mg Intravenous Q12H Shanelle Campos MD   2,000 mg at 02/15/21 0530    vancomycin (VANCOCIN) intermittent dosing (placeholder)   Other RX Placeholder Shanelle Campos MD        aspirin EC tablet 81 mg  81 mg Oral Daily Shanelle Campos MD   81 mg at 02/15/21 0849    atorvastatin (LIPITOR) tablet 20 mg  20 mg Oral Daily Shanelle Campos MD   20 mg at 02/15/21 0849    metoprolol succinate (TOPROL XL) extended release tablet 100 mg  100 mg Oral Daily Shanelle Campos MD   100 mg at 02/15/21 0849    NIFEdipine (PROCARDIA XL) extended release tablet 60 mg  60 mg Oral Daily Shanelle Campos MD   60 mg at 02/15/21 0849    pantoprazole (PROTONIX) tablet 40 mg  40 mg Oral Daily Shanelle Campos MD   40 mg at 02/15/21 0849    tamsulosin (FLOMAX) capsule 0.4 mg  0.4 mg Oral Daily Shanelle Campos MD   0.4 mg at 02/15/21 0849    trospium (SANCTURA) tablet 20 mg  20 mg Oral Nightly Shanelle Campos MD   20 mg at 02/14/21 6003  sodium chloride flush 0.9 % injection 10 mL  10 mL Intravenous 2 times per day Melissa Calvo MD   10 mL at 02/14/21 2213    sodium chloride flush 0.9 % injection 10 mL  10 mL Intravenous PRN Melissa Calvo MD        enoxaparin (LOVENOX) injection 40 mg  40 mg Subcutaneous Q24H Melissa Calvo MD   40 mg at 02/14/21 2213    polyethylene glycol (GLYCOLAX) packet 17 g  17 g Oral Daily PRN Melissa Calvo MD   17 g at 02/14/21 2233    acetaminophen (TYLENOL) tablet 650 mg  650 mg Oral Q6H PRN Melissa Calvo MD        Or    acetaminophen (TYLENOL) suppository 650 mg  650 mg Rectal Q6H PRN Melissa Calvo MD        insulin lispro (HUMALOG) injection vial 0-6 Units  0-6 Units Subcutaneous TID WC Melissa Clavo MD   2 Units at 02/15/21 1301    insulin lispro (HUMALOG) injection vial 0-3 Units  0-3 Units Subcutaneous Nightly Melissa Calvo MD   2 Units at 02/14/21 2214    ipratropium-albuterol (DUONEB) nebulizer solution 1 ampule  1 ampule Inhalation Q4H Melissa Calvo MD   1 ampule at 02/15/21 1432    doxycycline (VIBRAMYCIN) 100 mg in dextrose 5 % 100 mL IVPB  100 mg Intravenous Q12H Melissa Calvo  mL/hr at 02/15/21 1351 100 mg at 02/15/21 1351    glucose (GLUTOSE) 40 % oral gel 15 g  15 g Oral PRN Melissa Calvo MD        dextrose 50 % IV solution  12.5 g Intravenous PRN Melissa Calvo MD        glucagon (rDNA) injection 1 mg  1 mg Intramuscular PRN Melissa Calvo MD        dextrose 5 % solution  100 mL/hr Intravenous PRN Melissa Calvo MD            Labs:     Recent Labs     02/13/21  0356 02/14/21  0047 02/15/21  0306   WBC 4.2* 6.8 9.3   RBC 4.70 4.57* 4.25*   HGB 13.2* 12.8* 11.8*   HCT 41.9* 39.8* 36.8*   MCV 89.1 87.1 86.6   MCH 28.1 28.0 27.8   MCHC 31.5* 32.2* 32.1*    179 199     Recent Labs     02/13/21  0356 02/14/21  0047 02/15/21  0306   * 132* 134*   K 4.2 4.1 4.6   ANIONGAP 12 10 11   CL 96* 96* 99   CO2 24 26 24   BUN 50* 55* 50*   CREATININE 2.1* 2.1* 1.7* Supportive management. PT/OT/SLP.     Advance Directive: Full Code    DVT prophylaxis: Lovenox    Discharge planning: TBD      Signed:  Rose Herron MD 2/15/2021 2:32 PM  Rounding Hospitalist

## 2021-02-16 ENCOUNTER — APPOINTMENT (OUTPATIENT)
Dept: NUCLEAR MEDICINE | Age: 81
DRG: 871 | End: 2021-02-16
Payer: MEDICARE

## 2021-02-16 LAB
ALBUMIN SERPL-MCNC: 3.1 G/DL (ref 3.5–5.2)
ALP BLD-CCNC: 107 U/L (ref 40–130)
ALT SERPL-CCNC: 79 U/L (ref 5–41)
ANION GAP SERPL CALCULATED.3IONS-SCNC: 9 MMOL/L (ref 7–19)
AST SERPL-CCNC: 55 U/L (ref 5–40)
BASOPHILS ABSOLUTE: 0 K/UL (ref 0–0.2)
BASOPHILS RELATIVE PERCENT: 0.2 % (ref 0–1)
BILIRUB SERPL-MCNC: 0.3 MG/DL (ref 0.2–1.2)
BUN BLDV-MCNC: 44 MG/DL (ref 8–23)
CALCIUM SERPL-MCNC: 8.3 MG/DL (ref 8.8–10.2)
CHLORIDE BLD-SCNC: 103 MMOL/L (ref 98–111)
CO2: 25 MMOL/L (ref 22–29)
CREAT SERPL-MCNC: 1.5 MG/DL (ref 0.5–1.2)
EOSINOPHILS ABSOLUTE: 0 K/UL (ref 0–0.6)
EOSINOPHILS RELATIVE PERCENT: 0 % (ref 0–5)
GFR AFRICAN AMERICAN: 55
GFR NON-AFRICAN AMERICAN: 45
GLUCOSE BLD-MCNC: 186 MG/DL (ref 70–99)
GLUCOSE BLD-MCNC: 199 MG/DL (ref 70–99)
GLUCOSE BLD-MCNC: 281 MG/DL (ref 74–109)
GLUCOSE BLD-MCNC: 301 MG/DL (ref 70–99)
HCT VFR BLD CALC: 38.6 % (ref 42–52)
HEMOGLOBIN: 12.3 G/DL (ref 14–18)
IMMATURE GRANULOCYTES #: 0.6 K/UL
LYMPHOCYTES ABSOLUTE: 0.6 K/UL (ref 1.1–4.5)
LYMPHOCYTES RELATIVE PERCENT: 6.2 % (ref 20–40)
MAGNESIUM: 1.6 MG/DL (ref 1.6–2.4)
MCH RBC QN AUTO: 28 PG (ref 27–31)
MCHC RBC AUTO-ENTMCNC: 31.9 G/DL (ref 33–37)
MCV RBC AUTO: 87.9 FL (ref 80–94)
MONOCYTES ABSOLUTE: 0.6 K/UL (ref 0–0.9)
MONOCYTES RELATIVE PERCENT: 5.7 % (ref 0–10)
NEUTROPHILS ABSOLUTE: 8.2 K/UL (ref 1.5–7.5)
NEUTROPHILS RELATIVE PERCENT: 82.2 % (ref 50–65)
PDW BLD-RTO: 13.2 % (ref 11.5–14.5)
PERFORMED ON: ABNORMAL
PLATELET # BLD: 200 K/UL (ref 130–400)
PMV BLD AUTO: 11 FL (ref 9.4–12.4)
POTASSIUM SERPL-SCNC: 4.6 MMOL/L (ref 3.5–5)
RBC # BLD: 4.39 M/UL (ref 4.7–6.1)
SODIUM BLD-SCNC: 137 MMOL/L (ref 136–145)
TOTAL PROTEIN: 5.3 G/DL (ref 6.6–8.7)
VANCOMYCIN TROUGH: 13 UG/ML (ref 10–20)
WBC # BLD: 10 K/UL (ref 4.8–10.8)

## 2021-02-16 PROCEDURE — 36415 COLL VENOUS BLD VENIPUNCTURE: CPT

## 2021-02-16 PROCEDURE — 2580000003 HC RX 258: Performed by: INTERNAL MEDICINE

## 2021-02-16 PROCEDURE — 97535 SELF CARE MNGMENT TRAINING: CPT

## 2021-02-16 PROCEDURE — 85025 COMPLETE CBC W/AUTO DIFF WBC: CPT

## 2021-02-16 PROCEDURE — 97530 THERAPEUTIC ACTIVITIES: CPT

## 2021-02-16 PROCEDURE — 82947 ASSAY GLUCOSE BLOOD QUANT: CPT

## 2021-02-16 PROCEDURE — 83735 ASSAY OF MAGNESIUM: CPT

## 2021-02-16 PROCEDURE — 6360000002 HC RX W HCPCS: Performed by: INTERNAL MEDICINE

## 2021-02-16 PROCEDURE — 80202 ASSAY OF VANCOMYCIN: CPT

## 2021-02-16 PROCEDURE — 2140000000 HC CCU INTERMEDIATE R&B

## 2021-02-16 PROCEDURE — 97165 OT EVAL LOW COMPLEX 30 MIN: CPT

## 2021-02-16 PROCEDURE — 6370000000 HC RX 637 (ALT 250 FOR IP): Performed by: INTERNAL MEDICINE

## 2021-02-16 PROCEDURE — 2500000003 HC RX 250 WO HCPCS: Performed by: INTERNAL MEDICINE

## 2021-02-16 PROCEDURE — 2700000000 HC OXYGEN THERAPY PER DAY

## 2021-02-16 PROCEDURE — 94640 AIRWAY INHALATION TREATMENT: CPT

## 2021-02-16 PROCEDURE — 97116 GAIT TRAINING THERAPY: CPT

## 2021-02-16 PROCEDURE — 80053 COMPREHEN METABOLIC PANEL: CPT

## 2021-02-16 RX ADMIN — PANTOPRAZOLE SODIUM 40 MG: 40 TABLET, DELAYED RELEASE ORAL at 08:54

## 2021-02-16 RX ADMIN — DOCUSATE SODIUM 100 MG: 100 CAPSULE, LIQUID FILLED ORAL at 08:54

## 2021-02-16 RX ADMIN — ATORVASTATIN CALCIUM 20 MG: 20 TABLET, FILM COATED ORAL at 08:54

## 2021-02-16 RX ADMIN — TAMSULOSIN HYDROCHLORIDE 0.4 MG: 0.4 CAPSULE ORAL at 08:54

## 2021-02-16 RX ADMIN — ASPIRIN 81 MG: 81 TABLET, COATED ORAL at 08:54

## 2021-02-16 RX ADMIN — SODIUM CHLORIDE: 9 INJECTION, SOLUTION INTRAVENOUS at 00:55

## 2021-02-16 RX ADMIN — IPRATROPIUM BROMIDE AND ALBUTEROL SULFATE 1 AMPULE: 2.5; .5 SOLUTION RESPIRATORY (INHALATION) at 06:47

## 2021-02-16 RX ADMIN — NIFEDIPINE 60 MG: 60 TABLET, FILM COATED, EXTENDED RELEASE ORAL at 08:54

## 2021-02-16 RX ADMIN — IPRATROPIUM BROMIDE AND ALBUTEROL SULFATE 1 AMPULE: 2.5; .5 SOLUTION RESPIRATORY (INHALATION) at 22:23

## 2021-02-16 RX ADMIN — ARFORMOTEROL TARTRATE 15 MCG: 15 SOLUTION RESPIRATORY (INHALATION) at 06:53

## 2021-02-16 RX ADMIN — SODIUM CHLORIDE, PRESERVATIVE FREE 10 ML: 5 INJECTION INTRAVENOUS at 21:50

## 2021-02-16 RX ADMIN — TROSPIUM CHLORIDE 20 MG: 20 TABLET, FILM COATED ORAL at 21:50

## 2021-02-16 RX ADMIN — BUDESONIDE 500 MCG: 0.5 INHALANT RESPIRATORY (INHALATION) at 19:22

## 2021-02-16 RX ADMIN — METOPROLOL SUCCINATE 100 MG: 50 TABLET, EXTENDED RELEASE ORAL at 08:54

## 2021-02-16 RX ADMIN — STANDARDIZED SENNA CONCENTRATE 8.6 MG: 8.6 TABLET ORAL at 21:50

## 2021-02-16 RX ADMIN — BUDESONIDE 500 MCG: 0.5 INHALANT RESPIRATORY (INHALATION) at 06:53

## 2021-02-16 RX ADMIN — ARFORMOTEROL TARTRATE 15 MCG: 15 SOLUTION RESPIRATORY (INHALATION) at 19:21

## 2021-02-16 RX ADMIN — CEFEPIME HYDROCHLORIDE 2000 MG: 2 INJECTION, POWDER, FOR SOLUTION INTRAVENOUS at 05:13

## 2021-02-16 RX ADMIN — DOXYCYCLINE 100 MG: 100 INJECTION, POWDER, LYOPHILIZED, FOR SOLUTION INTRAVENOUS at 16:24

## 2021-02-16 RX ADMIN — IPRATROPIUM BROMIDE AND ALBUTEROL SULFATE 1 AMPULE: 2.5; .5 SOLUTION RESPIRATORY (INHALATION) at 02:24

## 2021-02-16 RX ADMIN — INSULIN LISPRO 2 UNITS: 100 INJECTION, SOLUTION INTRAVENOUS; SUBCUTANEOUS at 21:51

## 2021-02-16 RX ADMIN — CEFEPIME HYDROCHLORIDE 2000 MG: 2 INJECTION, POWDER, FOR SOLUTION INTRAVENOUS at 19:14

## 2021-02-16 RX ADMIN — GUAIFENESIN 1200 MG: 600 TABLET, EXTENDED RELEASE ORAL at 21:50

## 2021-02-16 RX ADMIN — INSULIN LISPRO 1 UNITS: 100 INJECTION, SOLUTION INTRAVENOUS; SUBCUTANEOUS at 14:14

## 2021-02-16 RX ADMIN — INSULIN LISPRO 1 UNITS: 100 INJECTION, SOLUTION INTRAVENOUS; SUBCUTANEOUS at 09:17

## 2021-02-16 RX ADMIN — ENOXAPARIN SODIUM 40 MG: 40 INJECTION SUBCUTANEOUS at 21:50

## 2021-02-16 RX ADMIN — VANCOMYCIN HYDROCHLORIDE 1250 MG: 10 INJECTION, POWDER, LYOPHILIZED, FOR SOLUTION INTRAVENOUS at 13:36

## 2021-02-16 RX ADMIN — IPRATROPIUM BROMIDE AND ALBUTEROL SULFATE 1 AMPULE: 2.5; .5 SOLUTION RESPIRATORY (INHALATION) at 13:55

## 2021-02-16 RX ADMIN — IPRATROPIUM BROMIDE AND ALBUTEROL SULFATE 1 AMPULE: 2.5; .5 SOLUTION RESPIRATORY (INHALATION) at 19:22

## 2021-02-16 RX ADMIN — SODIUM CHLORIDE, PRESERVATIVE FREE 10 ML: 5 INJECTION INTRAVENOUS at 08:58

## 2021-02-16 RX ADMIN — METHYLPREDNISOLONE SODIUM SUCCINATE 40 MG: 40 INJECTION, POWDER, FOR SOLUTION INTRAMUSCULAR; INTRAVENOUS at 08:54

## 2021-02-16 RX ADMIN — GUAIFENESIN 1200 MG: 600 TABLET, EXTENDED RELEASE ORAL at 08:54

## 2021-02-16 RX ADMIN — DOXYCYCLINE 100 MG: 100 INJECTION, POWDER, LYOPHILIZED, FOR SOLUTION INTRAVENOUS at 00:55

## 2021-02-16 ASSESSMENT — PAIN SCALES - GENERAL
PAINLEVEL_OUTOF10: 0
PAINLEVEL_OUTOF10: 0

## 2021-02-16 NOTE — PROGRESS NOTES
Monitor room reported pt had a 9 beat run of VTACH. Checked on pt. He has no complaints.  Electronically signed by Eileen Altman RN on 2/15/2021 at 11:54 PM

## 2021-02-16 NOTE — PROGRESS NOTES
Additional Comments: pt reports that when he came home from the hospital he was doing everything independently, but he became sick again and had to come back to the hospital.     Objective   Observation/Palpation  Posture: Fair  Observation: R hip appears elevated  ADL  Feeding: Independent  Grooming: Independent  Toileting: Setup  Additional Comments: Pt wants to use the urinal in the bed. Bed mobility  Rolling to Left: Independent  Rolling to Right: Independent  Supine to Sit: Independent  Sit to Supine: Independent  Scooting: Supervision  Comment: Pt becomes SOB with any activity or functional movement. Cognition  Overall Cognitive Status: WFL  Cognition Comment: Pt does not fully understand the tests for his heart and appears to be anxious. LUE AROM (degrees)  LUE AROM : WNL  RUE AROM (degrees)  RUE AROM : WNL  LUE Strength  Gross LUE Strength: WFL  L Hand General: 4+/5  RUE Strength  Gross RUE Strength: WFL  R Hand General: 4+/5  L Fingers  Left Finger Strength Comment: mass  strength is 4+/5  R Fingers  Right Finger Strength Comment: mass  strength is 4+/5    Assessment   Assessment  Performance deficits / Impairments: Decreased ADL status  Assessment: Pt is independent with ADLS. Pt has anxiety and SOB with activities. Treatment Diagnosis: Z74.1 Need assistance with self cares. M62.81 Muscle weakness. Prognosis: Fair  Decision Making: Low Complexity  REQUIRES OT FOLLOW UP: No  Treatment Initiated : Toileting and functional transfers for self care. No Skilled OT: Independent with ADL's;Safe to return home  Discharge Recommendations: Home with assist PRN  Safety Devices  Safety Devices in place: Yes  Type of devices: Bed alarm in place;Call light within reach  Type of devices: Bed alarm in place;Call light within reach       Plan   Plan  Times per week: 0  Plan Comment: Pt discharged from OT at this time.       Goals  Short term goals Short term goal 1: Pt to be Independent with toileting. Goal met. Short term goal 2: OT training patient regarding safety of functional transfer for ADLS. Long term goals  Long term goal 1: Pt independent with ADLS. Goal met. Long term goal 2: Pt independent with teaching of safety of ADLS. Goal met. Patient Goals   Patient goals : To be able to go home.        Therapy Time   Individual Concurrent Group Co-treatment   Time In 0940         Time Out 1012         Minutes [address], OT Electronically signed by Julito Mason OT on 2/16/2021 at 2:54 PM

## 2021-02-16 NOTE — PLAN OF CARE
Problem: Falls - Risk of:  Goal: Will remain free from falls  Outcome: Ongoing  Goal: Absence of physical injury  Outcome: Ongoing     Problem: Discharge Planning:  Goal: Discharged to appropriate level of care  Outcome: Ongoing     Problem: Gas Exchange - Impaired:  Goal: Levels of oxygenation will improve  Outcome: Ongoing     Problem: Infection, Septic Shock:  Goal: Will show no infection signs and symptoms  Outcome: Ongoing     Problem: Infection - Ventilator-Associated Pneumonia:  Goal: Absence of pulmonary infection  Outcome: Ongoing     Problem: Serum Glucose Level - Abnormal:  Goal: Ability to maintain appropriate glucose levels will improve  Outcome: Ongoing     Problem: Tissue Perfusion, Altered:  Goal: Circulatory function within specified parameters  Outcome: Ongoing     Problem: Venous Thromboembolism:  Goal: Will show no signs or symptoms of venous thromboembolism  Outcome: Ongoing  Goal: Absence of signs or symptoms of impaired coagulation  Outcome: Ongoing

## 2021-02-16 NOTE — PROGRESS NOTES
Patient transferred to room 717 from 711 via bed with O2 on tank at 6L d/t a safety need for another pt. Pt's belongings, recliner, and table moved with him. Pt tolerated room change without issue.  Electronically signed by Suresh Hanley RN on 2/16/2021 at 12:50 AM

## 2021-02-16 NOTE — PROGRESS NOTES
Patient here for Lorraine scan. After reviewing notes from cardiologist, I went to confirm order for Lorraine today with Dr Raj Moss. He stated to bring patient back in as out patient after letting him recover from his pneumonia before attempting the [place]. Notified patient and Ben Rojasiss his nurse.

## 2021-02-16 NOTE — PROGRESS NOTES
[facility]        Hospitalist Progress Note  2/16/2021 2:08 PM  Subjective:   Admit Date: 2/12/2021  PCP: Peggy Collet, MD    Chief Complaint: Dyspnea    Subjective: Patient seen and examined at bedside. Slowly improving. No new complaints. Cumulative Hospital History: 67-year-old obese male with a past medical history of COPD not on home oxygen, CKD stage III, diabetes, hypertension, BPH, h/o bladder CA recently admitted for bilateral pneumonia requiring intubation and mechanical intubation discharged to inpatient rehab (by me) with improvement and eventual discharge home. Patient presents back to the ED with worsening dyspnea found to have worsening bilateral pneumonia on CXR, poor PO intake and associated ANA on CKD and elevated troponin. Cardiology consulted for elevated troponin. Troponin trending down - needs improvement in pulmonary status prior to stress testing or intervention from cardiology. Noted to have 2 of 2 bottles staph bacteremia and infectious disease consulted. ROS: 14 point review of systems is negative except as specifically addressed above.     Diet NPO Effective Now    Intake/Output Summary (Last 24 hours) at 2/16/2021 1408  Last data filed at 2/16/2021 1109  Gross per 24 hour   Intake 825 ml   Output 1425 ml   Net -600 ml     Medications:   sodium chloride 100 mL/hr at 02/16/21 0055    dextrose       Current Facility-Administered Medications   Medication Dose Route Frequency Provider Last Rate Last Admin    vancomycin (VANCOCIN) 1,250 mg in dextrose 5 % 250 mL IVPB  1,250 mg Intravenous Once Piedad Miller .7 mL/hr at 02/16/21 1336 1,250 mg at 02/16/21 1336    technetium sestamibi (CARDIOLITE) injection 10 millicurie  10 millicurie Intravenous ONCE PRN Dinorah Kaur MD        technetium sestamibi (CARDIOLITE) injection 30 millicurie  30 millicurie Intravenous ONCE PRN Dinorah Kaur MD  methylPREDNISolone sodium (SOLU-MEDROL) injection 40 mg  40 mg Intravenous Daily Colleen Walton MD   40 mg at 02/16/21 0854    guaiFENesin (ROBITUSSIN) 100 MG/5ML syrup 200 mg  200 mg Oral Q4H PRN Colleen Walton MD        Benzocaine-Menthol (CEPACOL) 1 lozenge  1 lozenge Oral Q2H PRN Colleen Walton MD        phenol 1.4 % mouth spray 1 spray  1 spray Mouth/Throat Q2H PRN Colleen Walton MD        docusate sodium (COLACE) capsule 100 mg  100 mg Oral Daily Colleen Walton MD   100 mg at 02/16/21 0854    senna (SENOKOT) tablet 8.6 mg  1 tablet Oral Nightly Colleen Walton MD   8.6 mg at 02/15/21 2226    lactulose (CHRONULAC) 10 GM/15ML solution 10 g  10 g Oral TID PRN Colleen Walton MD   10 g at 02/15/21 0849    bisacodyl (DULCOLAX) suppository 10 mg  10 mg Rectal Daily PRN Colleen Walton MD        guaiFENesin [facility]) extended release tablet 1,200 mg  1,200 mg Oral BID Gricelda Bhagat MD   1,200 mg at 02/16/21 0854    budesonide (PULMICORT) nebulizer suspension 500 mcg  0.5 mg Nebulization BID Colleen Walton MD   500 mcg at 02/16/21 1988    Arformoterol Tartrate (BROVANA) nebulizer solution 15 mcg  15 mcg Nebulization BID Colleen Walton MD   15 mcg at 02/16/21 0653    0.9 % sodium chloride infusion   Intravenous Continuous Colleen Walton  mL/hr at 02/16/21 0055 New Bag at 02/16/21 0055    ceFEPIme (MAXIPIME) 2,000 mg in sterile water 20 mL IV syringe  2,000 mg Intravenous Q12H Cheli Hancock MD   2,000 mg at 02/16/21 2113    vancomycin (VANCOCIN) intermittent dosing (placeholder)   Other RX Placeholder Cheli Hancock MD        aspirin EC tablet 81 mg  81 mg Oral Daily Cheli Hancock MD   81 mg at 02/16/21 0854    atorvastatin (LIPITOR) tablet 20 mg  20 mg Oral Daily Cheli Hancock MD   20 mg at 02/16/21 0854    metoprolol succinate (TOPROL XL) extended release tablet 100 mg  100 mg Oral Daily Cheli Hancock MD   100 mg at 02/16/21 6709  NIFEdipine (PROCARDIA XL) extended release tablet 60 mg  60 mg Oral Daily Jazmin Tate MD   60 mg at 02/16/21 0854    pantoprazole (PROTONIX) tablet 40 mg  40 mg Oral Daily Jazmin Tate MD   40 mg at 02/16/21 0854    tamsulosin (FLOMAX) capsule 0.4 mg  0.4 mg Oral Daily Jazmin Tate MD   0.4 mg at 02/16/21 9272    trospium (SANCTURA) tablet 20 mg  20 mg Oral Nightly Jazmin Tate MD   20 mg at 02/15/21 2226    sodium chloride flush 0.9 % injection 10 mL  10 mL Intravenous 2 times per day Jazmin Tate MD   10 mL at 02/16/21 0858    sodium chloride flush 0.9 % injection 10 mL  10 mL Intravenous PRN Jazmin Tate MD        enoxaparin (LOVENOX) injection 40 mg  40 mg Subcutaneous Q24H Jazmin Tate MD   40 mg at 02/15/21 2226    polyethylene glycol (GLYCOLAX) packet 17 g  17 g Oral Daily PRN Jazmin Tate MD   17 g at 02/14/21 2233    acetaminophen (TYLENOL) tablet 650 mg  650 mg Oral Q6H PRN Jazmin Tate MD        Or    acetaminophen (TYLENOL) suppository 650 mg  650 mg Rectal Q6H PRN Jazmin Tate MD        insulin lispro (HUMALOG) injection vial 0-6 Units  0-6 Units Subcutaneous TID WC Jazmin Tate MD   1 Units at 02/16/21 7662    insulin lispro (HUMALOG) injection vial 0-3 Units  0-3 Units Subcutaneous Nightly Jazmin Tate MD   2 Units at 02/15/21 2227    ipratropium-albuterol (DUONEB) nebulizer solution 1 ampule  1 ampule Inhalation Q4H Jazmin Tate MD   1 ampule at 02/16/21 1355    doxycycline (VIBRAMYCIN) 100 mg in dextrose 5 % 100 mL IVPB  100 mg Intravenous Q12H Jazmin Tate MD   Stopped at 02/16/21 0155    glucose (GLUTOSE) 40 % oral gel 15 g  15 g Oral PRN Jazmin Tate MD        dextrose 50 % IV solution  12.5 g Intravenous PRN Jazmin Tate MD        glucagon (rDNA) injection 1 mg  1 mg Intramuscular PRN Jazmin Tate MD        dextrose 5 % solution  100 mL/hr Intravenous PRN Jazmin Tate MD            Labs:     Recent Labs     02/14/21  0536 02/15/21 5239 02/16/21 0318   WBC 6.8 9.3 10.0   RBC 4.57* 4.25* 4.39*   HGB 12.8* 11.8* 12.3*   HCT 39.8* 36.8* 38.6*   MCV 87.1 86.6 87.9   MCH 28.0 27.8 28.0   MCHC 32.2* 32.1* 31.9*    199 200     Recent Labs     02/14/21  0047 02/15/21  0306 02/16/21  0318   * 134* 137   K 4.1 4.6 4.6   ANIONGAP 10 11 9   CL 96* 99 103   CO2 26 24 25   BUN 55* 50* 44*   CREATININE 2.1* 1.7* 1.5*   GLUCOSE 243* 301* 281*   CALCIUM 8.5* 7.9* 8.3*     Recent Labs     02/14/21  0047 02/15/21  0306 02/16/21  0318   MG 1.6 1.6 1.6     Recent Labs     02/14/21  0047 02/15/21  0306 02/16/21  0318   AST 52* 53* 55*   ALT 42* 59* 79*   BILITOT 0.3 0.3 0.3   ALKPHOS 101 95 107     ABGs:No results for input(s): PH, PO2, PCO2, HCO3, BE, O2SAT in the last 72 hours. Troponin T:   Recent Labs     02/13/21  1805 02/14/21 0047   TROPONINI 0.51* 0.48*     INR: No results for input(s): INR in the last 72 hours. Lactic Acid:   Recent Labs     02/14/21  0237   LACTA 0.7       Objective:   Vitals: /75   Pulse 73   Temp 96 °F (35.6 °C) (Temporal)   Resp 18   Ht 6' (1.829 m)   Wt 238 lb (108 kg)   SpO2 96%   BMI 32.28 kg/m²   24HR INTAKE/OUTPUT:      Intake/Output Summary (Last 24 hours) at 2/16/2021 1408  Last data filed at 2/16/2021 1109  Gross per 24 hour   Intake 825 ml   Output 1425 ml   Net -600 ml     General appearance: alert and cooperative with exam  HEENT: AT/NC  Lungs: BLAE, +mild diffuse wheezing, +coarse breath sounds bilaterally  Heart: RRR  Abdomen: BS+, soft, NT, ND  Extremities: pulses+  Neurologic: Alert, gross motor function intact  Skin: warm    Assessment and Plan: Active Problems:    Sepsis ([address]  Resolved Problems:    * No resolved hospital problems. *    Pneumonia/COPD Exacerbation: Broad spectrum antibiotics. Respiratory cultures if able. Bronchodilators. Wean O2 as tolerated. Steroid taper.

## 2021-02-16 NOTE — PROGRESS NOTES
02/16/21 1016   Restrictions/Precautions   Restrictions/Precautions Fall Risk   Required Braces or Orthoses? No   General   Chart Reviewed Yes   Additional Pertinent Hx recent hospital admit with PNA   Family / Caregiver Present No   Subjective   Subjective I keep telling them I can't breathe I don't have chest pain   General Comment   Comments Patient anxous and irritated  at tests scheduled   Lexiscan today   Pain Screening   Patient Currently in Pain Denies   Intervention List Patient able to continue with treatment   Oxygen Therapy   O2 Device Nasal cannula   O2 Flow Rate (L/min) 5 L/min   Patient Observation   Observations Gait in 6 L   Orientation   Overall Orientation Status WFL   Bed Mobility   Supine to Sit Modified independent   Sit to Supine Modified independent   Transfers   Sit to Stand Contact guard assistance   Stand to sit Contact guard assistance   Ambulation   Ambulation? Yes   Ambulation 1   Device Rolling Walker   Other Apparatus O2   Assistance Contact guard assistance   Quality of Gait steady with RW   Gait Deviations Slow Nae;Decreased step length   Distance 50'   Comments Patient SOA post gait. Had to sit EOB x 5 minutes to recover  so he could  go to supine to use urinal.  Transporter arrived to transport to test   Activity Tolerance   Activity Tolerance Patient limited by endurance; Patient limited by fatigue   Physical Therapy    Electronically signed by Enma Jones PTA on 2/16/2021 at 10:25 AM

## 2021-02-16 NOTE — PROGRESS NOTES
INFECTIOUS DISEASES PROGRESS NOTE    Patient:  Brock Elkins  YOB: 1940  MRN: [id number]   Admit date: 2/12/2021   Admitting Physician: Carmela Iniguez MD  Primary Care Physician: Concepcion Ho MD    CHIEF COMPLAINT:   Therapy and echo \" showed up at same time\"       Interval History: Patient reports that he did walk in [place] with PT    Allergies: Allergies   Allergen Reactions    Eliquis [Apixaban] Other (See Comments)     \"almost bled to death\"    Promethazine Hcl Other (See Comments)     He became encephalopathic for several hours and was not responsive.        Current Meds:     technetium sestamibi (CARDIOLITE) injection 10 millicurie, ONCE PRN      technetium sestamibi (CARDIOLITE) injection 30 millicurie, ONCE PRN      methylPREDNISolone sodium (SOLU-MEDROL) injection 40 mg, Daily      guaiFENesin (ROBITUSSIN) 100 MG/5ML syrup 200 mg, Q4H PRN      Benzocaine-Menthol (CEPACOL) 1 lozenge, Q2H PRN      phenol 1.4 % mouth spray 1 spray, Q2H PRN      docusate sodium (COLACE) capsule 100 mg, Daily      senna (SENOKOT) tablet 8.6 mg, Nightly      lactulose (CHRONULAC) 10 GM/15ML solution 10 g, TID PRN      bisacodyl (DULCOLAX) suppository 10 mg, Daily PRN      guaiFENesin (MUCINEX) extended release tablet 1,200 mg, BID      budesonide (PULMICORT) nebulizer suspension 500 mcg, BID      Arformoterol Tartrate (BROVANA) nebulizer solution 15 mcg, BID      0.9 % sodium chloride infusion, Continuous      ceFEPIme (MAXIPIME) 2,000 mg in sterile water 20 mL IV syringe, Q12H      vancomycin (VANCOCIN) intermittent dosing (placeholder), RX Placeholder      aspirin EC tablet 81 mg, Daily      atorvastatin (LIPITOR) tablet 20 mg, Daily      metoprolol succinate (TOPROL XL) extended release tablet 100 mg, Daily      NIFEdipine (PROCARDIA XL) extended release tablet 60 mg, Daily      pantoprazole (PROTONIX) tablet 40 mg, Daily      tamsulosin (FLOMAX) capsule 0.4 mg, Daily   trospium (SANCTURA) tablet 20 mg, Nightly      sodium chloride flush 0.9 % injection 10 mL, 2 times per day      sodium chloride flush 0.9 % injection 10 mL, PRN      enoxaparin (LOVENOX) injection 40 mg, Q24H      polyethylene glycol (GLYCOLAX) packet 17 g, Daily PRN      acetaminophen (TYLENOL) tablet 650 mg, Q6H PRN    Or      acetaminophen (TYLENOL) suppository 650 mg, Q6H PRN      insulin lispro (HUMALOG) injection vial 0-6 Units, TID WC      insulin lispro (HUMALOG) injection vial 0-3 Units, Nightly      ipratropium-albuterol (DUONEB) nebulizer solution 1 ampule, Q4H      doxycycline (VIBRAMYCIN) 100 mg in dextrose 5 % 100 mL IVPB, Q12H      glucose (GLUTOSE) 40 % oral gel 15 g, PRN      dextrose 50 % IV solution, PRN      glucagon (rDNA) injection 1 mg, PRN      dextrose 5 % solution, PRN        Review of Systems   General: No fever  Respiratory: cough \" breaking up    Vital Signs:  /73   Pulse 71   Temp 96 °F (35.6 °C) (Temporal)   Resp 18   Ht 6' (1.829 m)   Wt 238 lb (108 kg)   SpO2 96%   BMI 32.28 kg/m²   Temp (24hrs), Av.9 °F (36.1 °C), Min:96 °F (35.6 °C), Max:97.8 °F (36.6 °C)      Physical Exam  General: Patient is an elderly male lying in bed flat on his back in no acute distress  HEENT: Sclera anicteric. O2 per nasal cannula in place.   Located patient's mask and he placed it over his nose and mouth  Lungs: Diminished breath sounds bilaterally right greater than left  Abdomen: Soft, nontender, nondistended, bowel sounds are positive  Neuro: Alert and oriented, speech clear  Psych: Pleasant cooperative        LAB RESULTS:    CBC with DIFF:  Recent Labs     21  0047 02/15/21  0306 21  0318   WBC 6.8 9.3 10.0   RBC 4.57* 4.25* 4.39*   HGB 12.8* 11.8* 12.3*   HCT 39.8* 36.8* 38.6*   MCV 87.1 86.6 87.9   MCH 28.0 27.8 28.0   MCHC 32.2* 32.1* 31.9*   RDW 12.7 12.8 13.2    199 200   MPV 11.3 11.5 11.0   NEUTOPHILPCT 88.9* 89.1* 82.2* LYMPHOPCT 6.3* 4.7* 6.2*   MONOPCT 3.5 3.3 5.7   EOSRELPCT 0.0 0.0 0.0   BASOPCT 0.1 0.1 0.2   NEUTROABS 6.1 8.3* 8.2*   LYMPHSABS 0.4* 0.4* 0.6*   MONOSABS 0.20 0.30 0.60   EOSABS 0.00 0.00 0.00   BASOSABS 0.00 0.00 0.00       CMP/BMP:  Recent Labs     02/14/21  0047 02/15/21  0306 02/16/21  0318   * 134* 137   K 4.1 4.6 4.6   CL 96* 99 103   CO2 26 24 25   ANIONGAP 10 11 9   GLUCOSE 243* 301* 281*   BUN 55* 50* 44*   CREATININE 2.1* 1.7* 1.5*   LABGLOM 31* 39* 45*   CALCIUM 8.5* 7.9* 8.3*   PROT 5.7* 5.2* 5.3*   LABALBU 2.8* 2.9* 3.1*   BILITOT 0.3 0.3 0.3   ALKPHOS 101 95 107   ALT 42* 59* 79*   AST 52* 53* 55*                  Respiratory Panel, Molecular, with COVID-19 (Restricted: peds pts or suitable admitted adults) [[id number]] Collected: 02/12/21 1615   Order Status: Completed Specimen: Nasopharyngeal Updated: 02/12/21 1713     Adenovirus by PCR Not Detected     Bordetella parapertussis by PCR Not Detected     Bordetella pertussis by PCR Not Detected     Chlamydophilia pneumoniae by PCR Not Detected     Coronavirus 229E by PCR Not Detected     Coronavirus HKU1 by PCR Not Detected     Coronavirus NL63 by PCR Not Detected     Coronavirus OC43 by PCR Not Detected     SARS-CoV-2, PCR Not Detected     Comment: This test has been authorized by FDA under an   Emergency Use Authorization (EUA). This test is only authorized for the duration of the   time of declaration that circumstances exist justifying the   authorization of the emergency use of in vitro diagnostic   testing for detection of the SARS-CoV-2 virus   and/or diagnosis of COVID-19 infection under   section 564 (b)(1) of the Act, 21 U. S.C. 969UOD-0 (b) (1),   unless the authorization is terminated or revoked sooner.      Patient Fact SettlementContracts.gl   Provider Fact SettlementContracts.gl     METHODOLOGY: Multiplex PCR         Human Metapneumovirus by PCR Not Detected   Human Rhinovirus/Enterovirus by PCR Not Detected     Influenza A by PCR Not Detected     Influenza B by PCR Not Detected     Mycoplasma pneumoniae by PCR Not Detected     Parainfluenza Virus 1 by PCR Not Detected     Parainfluenza Virus 2 by PCR Not Detected     Parainfluenza Virus 3 by PCR Not Detected     Parainfluenza Virus 4 by PCR Not Detected     Respiratory Syncytial Virus by PCR                     IMAGING:                   Culture, Blood 2 [[id number]] Collected: 02/14/21 0706   Order Status: Completed Specimen: Blood Updated: 02/15/21 0714    Culture, Blood 2 No Growth to date.  Any change in status will be called. Narrative:     ORDER#: [id number]                          ORDERED BY: Michael Mas   SOURCE: Blood Hand, Right                  COLLECTED:  02/14/21 07:06   ANTIBIOTICS AT [facility]. :                      RECEIVED :  02/14/21 07:10   Culture, Blood 1 [[id number]] Collected: 02/14/21 0609   Order Status: Completed Specimen: Blood Updated: 02/15/21 0714    Blood Culture, Routine No Growth to date.  Any change in status will be called. Narrative:     ORDER#: [id number]                          ORDERED BY: Michael Mas   SOURCE: Blood Antecubital-Lef              COLLECTED:  02/14/21 06:09   ANTIBIOTICS AT [facility]. :                      RECEIVED :  02/14/21 06:27   Culture, Blood 1 [[id number]] (Abnormal) Collected: 02/12/21 1546   Order Status: Completed Specimen: Blood Updated: 02/15/21 0627    Organism Staphylococcus coagulase-negativeAbnormal     Blood Culture, Routine --    Isolated from Aerobic bottle   No further workup   Refer to Blood Culture drawn on 2/12/21 at 16:15 for complete results    Narrative:     ORDER#: [id number]                          ORDERED BY: CELSA Corrigan   SOURCE: Blood Antecubital-Rig              COLLECTED:  02/12/21 15:46   ANTIBIOTICS AT [facility]. :                      RECEIVED :  02/12/21 15:55   CALL  [place]  [facility] tel. , Microbiology results called to and read back by Prince Brantley RN , 7th,   02/13/2021 12:56, by [facility]   Culture, Blood 2 [[id number]] (Abnormal)  Collected: 02/12/21 1615   Order Status: Completed Specimen: Blood Updated: 02/15/21 0633    Organism Staphylococcus epidermidisAbnormal     Culture, Blood 2 Isolated from Aerobic bottle   Narrative:     ORDER#: [id number]                          ORDERED BY: CELSA Cuellar   SOURCE: Blood Antecubital-Rig              COLLECTED:  02/12/21 16:15   ANTIBIOTICS AT [facility]. :                      RECEIVED :  02/12/21 16:21   [facility]   Microbiology results called to and read back by Prince Brantley RN 7th, 02/13/2021            RADIOLOGY      Ct Chest Wo Contrast    Result Date: 2/13/2021 EXAMINATION: CT CHEST WO CONTRAST 2/13/2021 2:17 PM HISTORY: Shortness of breath, bilateral pneumonia. DOSE: 976 mGycm. Automatic exposure control was utilized in an effort to use as little radiation as possible, without compromising image quality. REPORT: Spiral CT of the chest was performed without contrast, reconstructed coronal and sagittal images are also reviewed. COMPARISON: Portable chest x-ray to 12/20/2021. Review of lung windows demonstrates consolidative infiltrates in both lungs, on the right, this includes extensive infiltrate surrounding the right hilum, with contiguous infiltrate in the right upper lobe, with air bronchograms, there is also consolidation of the right lower lobe with air bronchograms and focal subpleural nodular infiltrates are identified in the right middle lobe and right upper lobe focally. In the left lung, there is a subpleural infiltrate in the left upper lobe along the major fissure, with mild paraseptal emphysematous changes. This is contiguous with mixed groundglass and consolidative infiltrates in the lingula and there is consolidation of the left lower lobe with air bronchograms, with associated bronchial wall thickening. A small amount of left perihilar infiltrate is also present. No pneumothorax is identified, there is a trace left pleural effusion, small right pleural effusion. No lung abscess or cavitation is seen in association with the areas of pneumonia. Soft tissue windows show a 12 mm low-attenuation lesion in the right thyroid lobe, which is most likely benign. There are normal-sized shotty appearing lymph nodes within the mediastinum without measurable lymphadenopathy. A small amount calcified plaques noted within the aortic arch and there is heavy calcified plaque within the coronary arteries. Heart size is normal. There is trace pericardial fluid. The visualized upper abdomen shows no acute abnormality. There is mild bilateral gynecomastia.  Review of bone windows shows advanced degenerative changes in the visualized upper lumbar spine. Consolidating infiltrates within both lungs, greatest in the right lower lobe and most compatible with bilateral pneumonia, there is a small right pleural effusion and trace left pleural effusion. No pneumothorax is identified. There is no lung abscess or areas of cavitation. Mild underlying changes of paraseptal emphysema are noted. No measurable intrathoracic lymphadenopathy is identified. Signed by Dr Sana Barrera on 2/13/2021 2:28 PM    Us Renal Complete    Result Date: 2/13/2021  EXAMINATION: US RENAL COMPLETE 2/13/2021 3:16 PM HISTORY: Acute kidney injury. Report: Sonographic images of the kidneys were obtained. COMPARISON: CT of the abdomen and pelvis with without contrast 7/31/2020. Ultrasound of the kidneys 10/23/2018. The right kidney measures 11.6 x 5.5 x 6.0 cm and has normal cortical echogenicity, with a cortical thickness that averages 12 mm. No solid mass is identified. There are 2 cysts in the right kidney appear benign, including one at the interpolar level measures 1.3 x 1.4 x 1.0 cm (previous measurement of 0.9 x 0.8 x 0.9 cm) and one at the inferior pole that measures 2.5 x 2.9 x 2.1 cm. This cyst previously measured 2.7 x 2.2 x 2.0 cm and is essentially unchanged Color Doppler images demonstrate vascular flow within the right kidney. The left kidney measures 10.4 x 4.5 x 4.0 cm and has normal morphology and cortical echogenicity without evidence of hydronephrosis or solid mass. There is a benign-appearing cyst at the inferior pole that measures 1 cm, not clearly seen on the previous ultrasound. Color Doppler images demonstrate vascular flow within the left kidney. Additional small cysts are seen in the kidneys on prior CT but poorly visualized on today's examination. The bladder is within normal limits. Bilateral renal cysts as detailed above, with a benign appearance. Some smaller cysts seen in the kidneys on previous CT are not visualized on today's examination No hydronephrosis is identified. Both kidneys have normal cortical echogenicity. The bladder is unremarkable. Signed by Dr Faye Ortiz. Holly on 2/13/2021 3:21 PM    Xr Chest Portable    Result Date: 2/12/2021  EXAMINATION: XR CHEST PORTABLE 2/12/2021 4:13 PM HISTORY: Shortness of breath, hypoxia, recent pneumonia COMPARISON: 1/18/2021 FINDINGS: The heart appears normal in size. Atherosclerotic calcifications are seen in the aorta. Bilateral airspace opacities are present with relative sparing of the upper lobes, markedly increased compared to the prior exam. There is no appreciable pneumothorax or definite pleural effusion. Bilateral pneumonia, though worse when compared to the prior exam. Follow-up PA and lateral chest radiograph to recommended in 6-8 weeks to document resolution.  Signed by Dr Abbie Leonard on 2/12/2021 4:14 PM                  Patient Active Problem List   Diagnosis    Diverticulitis of large intestine with perforation without bleeding    Generalized abdominal pain    Colonic diverticular abscess    Bilateral carotid artery stenosis    Atherosclerosis of native artery of both lower extremities with intermittent claudication ([address]    Carotid stenosis, asymptomatic, left    Primary osteoarthritis of left knee    Arthritis of knee    Essential hypertension    Pure hypercholesterolemia    Slow transit constipation    Iron deficiency anemia    GERD (gastroesophageal reflux disease)    Acute liver failure without hepatic coma    CHF (congestive heart failure) (HCC)    Bilateral pleural effusion    Palliative care patient    Shock liver    Acute renal failure ([address]    BPH associated with nocturia    Bleeding diathesis ([address]    Epistaxis    Acute blood loss anemia    Melena    Hypocalcemia  Pneumonia due to infectious organism    Bladder cancer [facility]    Postoperative pain    History of bladder cancer    Severe sepsis (HCC)    Acute on chronic respiratory failure (HCC)    UTI (urinary tract infection)    Acute on chronic kidney failure (HCC)    Hypoxemia    Metabolic acidosis    Acidosis, metabolic, with respiratory acidosis    Acute respiratory failure (HCC)    Type 2 diabetes mellitus with complication, without long-term current use of insulin (HCC)    Weakness    Other dysphagia    Low back pain    Sepsis (HCC)       IMPRESSION:  Positive blood cultures - Staph epi in one bottle of one set and second set - drawn in ED with difficulty per patient - also one bottle of one set - referred to other ( ie not identified)- Suspect drawn from right antecubital iv site in ED - contaminant.  Not consistent with pulmonary pathogen     Bilateral pneumonia right greater than left (patient had bilateral infiltrates right greater than left on chest x-ray during his January admission)      RECOMMENDATIONS :  Continue Mucinex to see if patient can become productive of sputum and we can collect specimens  contiunue broad abx  Sputum for gram stain and culture  Sputum for KOH and fungal culture          Douglas Talamantes MD

## 2021-02-16 NOTE — PROGRESS NOTES
Pharmacy Vancomycin Consult     Vancomycin Day: 5  Current Dosing: Vancomycin pulse dosing    Temp max:  97.9    Recent Labs     02/15/21  0306 02/16/21  0318   BUN 50* 44*       Recent Labs     02/15/21  0306 02/16/21  0318   CREATININE 1.7* 1.5*       Recent Labs     02/15/21  0306 02/16/21 0318   WBC 9.3 10.0         Intake/Output Summary (Last 24 hours) at 2/16/2021 0551  Last data filed at 2/16/2021 0454  Gross per 24 hour   Intake 1901.33 ml   Output 1625 ml   Net 276.33 ml     Culture Date Source Results   02/12/21 Blood  Staph coag negative   02/12/21 Blood Staph epidermidis   02/13/21 Blood No growth to date    02/14/21  blood   No growth to date         Ht Readings from Last 1 Encounters:   02/12/21 6' (1.829 m)        Wt Readings from Last 1 Encounters:   02/16/21 238 lb (108 kg)         Body mass index is 32.28 kg/m². Estimated Creatinine Clearance: 51 mL/min (A) (based on SCr of 1.5 mg/dL (H)). Random level: 13.0 (21 hour level)    Assessment/Plan: Continue Vancomycin pulse dosing. Serum creatinine continues to improve. Appears close to baseline. Give Vancomycin 1250 mg IV x 1 dose. Level ordered.     Electronically signed by Eliza Goncalves [address] [place] [address] on 2/16/2021 at 5:51 AM

## 2021-02-16 NOTE — PROGRESS NOTES
Pt's wife was notified this morning of patient's new room and phone number.  Electronically signed by Vishnu Reyez RN on 2/16/2021 at 7:26 AM

## 2021-02-17 LAB
ALBUMIN SERPL-MCNC: 3 G/DL (ref 3.5–5.2)
ALP BLD-CCNC: 88 U/L (ref 40–130)
ALT SERPL-CCNC: 68 U/L (ref 5–41)
ANION GAP SERPL CALCULATED.3IONS-SCNC: 12 MMOL/L (ref 7–19)
AST SERPL-CCNC: 33 U/L (ref 5–40)
BASOPHILS ABSOLUTE: 0.1 K/UL (ref 0–0.2)
BASOPHILS RELATIVE PERCENT: 0.5 % (ref 0–1)
BILIRUB SERPL-MCNC: 0.3 MG/DL (ref 0.2–1.2)
BUN BLDV-MCNC: 37 MG/DL (ref 8–23)
CALCIUM SERPL-MCNC: 8.5 MG/DL (ref 8.8–10.2)
CHLORIDE BLD-SCNC: 101 MMOL/L (ref 98–111)
CO2: 23 MMOL/L (ref 22–29)
CREAT SERPL-MCNC: 1.3 MG/DL (ref 0.5–1.2)
EKG P AXIS: 17 DEGREES
EKG P-R INTERVAL: 188 MS
EKG Q-T INTERVAL: 402 MS
EKG QRS DURATION: 110 MS
EKG QTC CALCULATION (BAZETT): 423 MS
EKG T AXIS: 81 DEGREES
EOSINOPHILS ABSOLUTE: 0 K/UL (ref 0–0.6)
EOSINOPHILS RELATIVE PERCENT: 0 % (ref 0–5)
GFR AFRICAN AMERICAN: >59
GFR NON-AFRICAN AMERICAN: 53
GLUCOSE BLD-MCNC: 152 MG/DL (ref 70–99)
GLUCOSE BLD-MCNC: 161 MG/DL (ref 70–99)
GLUCOSE BLD-MCNC: 181 MG/DL (ref 74–109)
GLUCOSE BLD-MCNC: 212 MG/DL (ref 70–99)
GLUCOSE BLD-MCNC: 287 MG/DL (ref 70–99)
HCT VFR BLD CALC: 39.1 % (ref 42–52)
HEMOGLOBIN: 12.5 G/DL (ref 14–18)
IMMATURE GRANULOCYTES #: 0.5 K/UL
LYMPHOCYTES ABSOLUTE: 1.1 K/UL (ref 1.1–4.5)
LYMPHOCYTES RELATIVE PERCENT: 10.9 % (ref 20–40)
MAGNESIUM: 1.6 MG/DL (ref 1.6–2.4)
MCH RBC QN AUTO: 27.8 PG (ref 27–31)
MCHC RBC AUTO-ENTMCNC: 32 G/DL (ref 33–37)
MCV RBC AUTO: 86.9 FL (ref 80–94)
MONOCYTES ABSOLUTE: 0.8 K/UL (ref 0–0.9)
MONOCYTES RELATIVE PERCENT: 7.9 % (ref 0–10)
MYCOPLASMA PNEUMONIAE IGG: 0.61 U/L
MYCOPLASMA PNEUMONIAE IGM: 0.03 U/L
NEUTROPHILS ABSOLUTE: 7.5 K/UL (ref 1.5–7.5)
NEUTROPHILS RELATIVE PERCENT: 75.3 % (ref 50–65)
PDW BLD-RTO: 13.3 % (ref 11.5–14.5)
PERFORMED ON: ABNORMAL
PLATELET # BLD: 197 K/UL (ref 130–400)
PMV BLD AUTO: 11.3 FL (ref 9.4–12.4)
POTASSIUM SERPL-SCNC: 4.2 MMOL/L (ref 3.5–5)
RBC # BLD: 4.5 M/UL (ref 4.7–6.1)
REASON FOR REJECTION: NORMAL
REJECTED TEST: NORMAL
SODIUM BLD-SCNC: 136 MMOL/L (ref 136–145)
TOTAL PROTEIN: 5.6 G/DL (ref 6.6–8.7)
VANCOMYCIN RANDOM: 17 UG/ML
WBC # BLD: 10 K/UL (ref 4.8–10.8)

## 2021-02-17 PROCEDURE — 2700000000 HC OXYGEN THERAPY PER DAY

## 2021-02-17 PROCEDURE — 6360000002 HC RX W HCPCS: Performed by: INTERNAL MEDICINE

## 2021-02-17 PROCEDURE — 92522 EVALUATE SPEECH PRODUCTION: CPT

## 2021-02-17 PROCEDURE — 94640 AIRWAY INHALATION TREATMENT: CPT

## 2021-02-17 PROCEDURE — 6370000000 HC RX 637 (ALT 250 FOR IP): Performed by: INTERNAL MEDICINE

## 2021-02-17 PROCEDURE — 36415 COLL VENOUS BLD VENIPUNCTURE: CPT

## 2021-02-17 PROCEDURE — 92610 EVALUATE SWALLOWING FUNCTION: CPT

## 2021-02-17 PROCEDURE — 80202 ASSAY OF VANCOMYCIN: CPT

## 2021-02-17 PROCEDURE — 2500000003 HC RX 250 WO HCPCS: Performed by: INTERNAL MEDICINE

## 2021-02-17 PROCEDURE — 2140000000 HC CCU INTERMEDIATE R&B

## 2021-02-17 PROCEDURE — 97116 GAIT TRAINING THERAPY: CPT

## 2021-02-17 PROCEDURE — 82947 ASSAY GLUCOSE BLOOD QUANT: CPT

## 2021-02-17 PROCEDURE — 85025 COMPLETE CBC W/AUTO DIFF WBC: CPT

## 2021-02-17 PROCEDURE — 2580000003 HC RX 258: Performed by: INTERNAL MEDICINE

## 2021-02-17 PROCEDURE — 83735 ASSAY OF MAGNESIUM: CPT

## 2021-02-17 PROCEDURE — 80053 COMPREHEN METABOLIC PANEL: CPT

## 2021-02-17 RX ORDER — HYDRALAZINE HYDROCHLORIDE 25 MG/1
25 TABLET, FILM COATED ORAL EVERY 8 HOURS SCHEDULED
Status: DISCONTINUED | OUTPATIENT
Start: 2021-02-17 | End: 2021-02-24 | Stop reason: HOSPADM

## 2021-02-17 RX ADMIN — METOPROLOL SUCCINATE 100 MG: 50 TABLET, EXTENDED RELEASE ORAL at 09:09

## 2021-02-17 RX ADMIN — SODIUM CHLORIDE, PRESERVATIVE FREE 10 ML: 5 INJECTION INTRAVENOUS at 21:30

## 2021-02-17 RX ADMIN — IPRATROPIUM BROMIDE AND ALBUTEROL SULFATE 1 AMPULE: 2.5; .5 SOLUTION RESPIRATORY (INHALATION) at 14:40

## 2021-02-17 RX ADMIN — GUAIFENESIN 1200 MG: 600 TABLET, EXTENDED RELEASE ORAL at 09:09

## 2021-02-17 RX ADMIN — STANDARDIZED SENNA CONCENTRATE 8.6 MG: 8.6 TABLET ORAL at 20:50

## 2021-02-17 RX ADMIN — ATORVASTATIN CALCIUM 20 MG: 20 TABLET, FILM COATED ORAL at 09:09

## 2021-02-17 RX ADMIN — ARFORMOTEROL TARTRATE 15 MCG: 15 SOLUTION RESPIRATORY (INHALATION) at 18:36

## 2021-02-17 RX ADMIN — SODIUM CHLORIDE, PRESERVATIVE FREE 10 ML: 5 INJECTION INTRAVENOUS at 09:09

## 2021-02-17 RX ADMIN — BUDESONIDE 500 MCG: 0.5 INHALANT RESPIRATORY (INHALATION) at 18:37

## 2021-02-17 RX ADMIN — IPRATROPIUM BROMIDE AND ALBUTEROL SULFATE 1 AMPULE: 2.5; .5 SOLUTION RESPIRATORY (INHALATION) at 21:45

## 2021-02-17 RX ADMIN — ASPIRIN 81 MG: 81 TABLET, COATED ORAL at 09:09

## 2021-02-17 RX ADMIN — TROSPIUM CHLORIDE 20 MG: 20 TABLET, FILM COATED ORAL at 20:50

## 2021-02-17 RX ADMIN — METHYLPREDNISOLONE SODIUM SUCCINATE 40 MG: 40 INJECTION, POWDER, FOR SOLUTION INTRAMUSCULAR; INTRAVENOUS at 09:09

## 2021-02-17 RX ADMIN — TAMSULOSIN HYDROCHLORIDE 0.4 MG: 0.4 CAPSULE ORAL at 09:09

## 2021-02-17 RX ADMIN — IPRATROPIUM BROMIDE AND ALBUTEROL SULFATE 1 AMPULE: 2.5; .5 SOLUTION RESPIRATORY (INHALATION) at 02:01

## 2021-02-17 RX ADMIN — ARFORMOTEROL TARTRATE 15 MCG: 15 SOLUTION RESPIRATORY (INHALATION) at 06:00

## 2021-02-17 RX ADMIN — PANTOPRAZOLE SODIUM 40 MG: 40 TABLET, DELAYED RELEASE ORAL at 09:09

## 2021-02-17 RX ADMIN — DOXYCYCLINE 100 MG: 100 INJECTION, POWDER, LYOPHILIZED, FOR SOLUTION INTRAVENOUS at 13:55

## 2021-02-17 RX ADMIN — BUDESONIDE 500 MCG: 0.5 INHALANT RESPIRATORY (INHALATION) at 06:00

## 2021-02-17 RX ADMIN — IPRATROPIUM BROMIDE AND ALBUTEROL SULFATE 1 AMPULE: 2.5; .5 SOLUTION RESPIRATORY (INHALATION) at 18:23

## 2021-02-17 RX ADMIN — HYDRALAZINE HYDROCHLORIDE 25 MG: 25 TABLET, FILM COATED ORAL at 20:50

## 2021-02-17 RX ADMIN — NIFEDIPINE 60 MG: 60 TABLET, FILM COATED, EXTENDED RELEASE ORAL at 09:09

## 2021-02-17 RX ADMIN — INSULIN LISPRO 1 UNITS: 100 INJECTION, SOLUTION INTRAVENOUS; SUBCUTANEOUS at 20:54

## 2021-02-17 RX ADMIN — INSULIN LISPRO 1 UNITS: 100 INJECTION, SOLUTION INTRAVENOUS; SUBCUTANEOUS at 13:55

## 2021-02-17 RX ADMIN — GUAIFENESIN 1200 MG: 600 TABLET, EXTENDED RELEASE ORAL at 20:50

## 2021-02-17 RX ADMIN — DOCUSATE SODIUM 100 MG: 100 CAPSULE, LIQUID FILLED ORAL at 09:08

## 2021-02-17 RX ADMIN — INSULIN LISPRO 1 UNITS: 100 INJECTION, SOLUTION INTRAVENOUS; SUBCUTANEOUS at 09:09

## 2021-02-17 RX ADMIN — IPRATROPIUM BROMIDE AND ALBUTEROL SULFATE 1 AMPULE: 2.5; .5 SOLUTION RESPIRATORY (INHALATION) at 11:00

## 2021-02-17 RX ADMIN — SODIUM CHLORIDE: 9 INJECTION, SOLUTION INTRAVENOUS at 05:40

## 2021-02-17 RX ADMIN — DOXYCYCLINE 100 MG: 100 INJECTION, POWDER, LYOPHILIZED, FOR SOLUTION INTRAVENOUS at 02:44

## 2021-02-17 RX ADMIN — INSULIN LISPRO 3 UNITS: 100 INJECTION, SOLUTION INTRAVENOUS; SUBCUTANEOUS at 17:02

## 2021-02-17 RX ADMIN — HYDRALAZINE HYDROCHLORIDE 25 MG: 25 TABLET, FILM COATED ORAL at 17:22

## 2021-02-17 RX ADMIN — ENOXAPARIN SODIUM 40 MG: 40 INJECTION SUBCUTANEOUS at 20:50

## 2021-02-17 RX ADMIN — CEFEPIME HYDROCHLORIDE 2000 MG: 2 INJECTION, POWDER, FOR SOLUTION INTRAVENOUS at 05:35

## 2021-02-17 RX ADMIN — IPRATROPIUM BROMIDE AND ALBUTEROL SULFATE 1 AMPULE: 2.5; .5 SOLUTION RESPIRATORY (INHALATION) at 06:00

## 2021-02-17 RX ADMIN — CEFEPIME HYDROCHLORIDE 2000 MG: 2 INJECTION, POWDER, FOR SOLUTION INTRAVENOUS at 17:22

## 2021-02-17 RX ADMIN — VANCOMYCIN HYDROCHLORIDE 1250 MG: 10 INJECTION, POWDER, LYOPHILIZED, FOR SOLUTION INTRAVENOUS at 12:29

## 2021-02-17 ASSESSMENT — PAIN SCALES - GENERAL
PAINLEVEL_OUTOF10: 0
PAINLEVEL_OUTOF10: 0

## 2021-02-17 NOTE — PROGRESS NOTES
[facility]        Hospitalist Progress Note  2/17/2021 2:33 PM  Subjective:   Admit Date: 2/12/2021  PCP: Raquel Drake MD    Chief Complaint: Dyspnea    Subjective: Patient seen and examined at bedside. Comfortable. Cumulative Hospital History: 71-year-old obese male with a past medical history of COPD not on home oxygen, CKD stage III, diabetes, hypertension, BPH, h/o bladder CA recently admitted for bilateral pneumonia requiring intubation and mechanical intubation discharged to inpatient rehab (by me) with improvement and eventual discharge home. Patient presents back to the ED with worsening dyspnea found to have worsening bilateral pneumonia on CXR, poor PO intake and associated ANA on CKD and elevated troponin. Cardiology consulted for elevated troponin. Troponin trending down - needs improvement in pulmonary status prior to stress testing or intervention from cardiology. Noted to have 2 of 2 bottles staph bacteremia and infectious disease consulted. ROS: 14 point review of systems is negative except as specifically addressed above. DIET CARDIAC;  Dysphagia Soft and Bite-Sized; Mildly Thick (Nectar)    Intake/Output Summary (Last 24 hours) at 2/17/2021 1433  Last data filed at 2/17/2021 1401  Gross per 24 hour   Intake 420 ml   Output 1775 ml   Net -1355 ml     Medications:   sodium chloride 100 mL/hr at 02/17/21 0540    dextrose       Current Facility-Administered Medications   Medication Dose Route Frequency Provider Last Rate Last Admin    vancomycin (VANCOCIN) 1,250 mg in dextrose 5 % 250 mL IVPB  1,250 mg Intravenous Q24H Neville Eastman .7 mL/hr at 02/17/21 1229 1,250 mg at 02/17/21 1229    hydrALAZINE (APRESOLINE) tablet 25 mg  25 mg Oral 3 times per day Neville Eastman MD        technetium sestamibi (CARDIOLITE) injection 10 millicurie  10 millicurie Intravenous ONCE PRN Neto Collazo MD  technetium sestamibi (CARDIOLITE) injection 30 millicurie  30 millicurie Intravenous ONCE PRN MD Nicolas RosarioaiFENesin (ROBITUSSIN) 100 MG/5ML syrup 200 mg  200 mg Oral Q4H PRN Mikel Tobin MD        Benzocaine-Menthol (CEPACOL) 1 lozenge  1 lozenge Oral Q2H PRN Mikel Tobin MD        phenol 1.4 % mouth spray 1 spray  1 spray Mouth/Throat Q2H PRN Miekl Tobin MD        docusate sodium (COLACE) capsule 100 mg  100 mg Oral Daily Mikel Tobin MD   100 mg at 02/17/21 0908    senna (SENOKOT) tablet 8.6 mg  1 tablet Oral Nightly Mikel Tobin MD   8.6 mg at 02/16/21 2150    lactulose (CHRONULAC) 10 GM/15ML solution 10 g  10 g Oral TID PRN Mikel Tobin MD   10 g at 02/15/21 0849    bisacodyl (DULCOLAX) suppository 10 mg  10 mg Rectal Daily PRN MD Nicolas Sifuentes [facility]) extended release tablet 1,200 mg  1,200 mg Oral BID Prabhu Vasquez MD   1,200 mg at 02/17/21 0909    budesonide (PULMICORT) nebulizer suspension 500 mcg  0.5 mg Nebulization BID Mikel Tobin MD   500 mcg at 02/17/21 0600    Arformoterol Tartrate (BROVANA) nebulizer solution 15 mcg  15 mcg Nebulization BID Mikel Tobin MD   15 mcg at 02/17/21 0600    0.9 % sodium chloride infusion   Intravenous Continuous Mikel Tobin  mL/hr at 02/17/21 0540 New Bag at 02/17/21 0540    ceFEPIme (MAXIPIME) 2,000 mg in sterile water 20 mL IV syringe  2,000 mg Intravenous Q12H Piedad Miller MD   2,000 mg at 02/17/21 0535    vancomycin (VANCOCIN) intermittent dosing (placeholder)   Other RX Placeholder Piedad Miller MD        aspirin EC tablet 81 mg  81 mg Oral Daily Piedad Miller MD   81 mg at 02/17/21 9805    atorvastatin (LIPITOR) tablet 20 mg  20 mg Oral Daily Piedad Miller MD   20 mg at 02/17/21 9052    metoprolol succinate (TOPROL XL) extended release tablet 100 mg  100 mg Oral Daily Piedad Miller MD   100 mg at 02/17/21 9784  NIFEdipine (PROCARDIA XL) extended release tablet 60 mg  60 mg Oral Daily Alina Pretty MD   60 mg at 02/17/21 7634    pantoprazole (PROTONIX) tablet 40 mg  40 mg Oral Daily Alina Pretty MD   40 mg at 02/17/21 0909    tamsulosin (FLOMAX) capsule 0.4 mg  0.4 mg Oral Daily Alina Pretty MD   0.4 mg at 02/17/21 9358    trospium (SANCTURA) tablet 20 mg  20 mg Oral Nightly Alina Pretty MD   20 mg at 02/16/21 2150    sodium chloride flush 0.9 % injection 10 mL  10 mL Intravenous 2 times per day Alina Pretty MD   10 mL at 02/17/21 0909    sodium chloride flush 0.9 % injection 10 mL  10 mL Intravenous PRN Alina Pretty MD        enoxaparin (LOVENOX) injection 40 mg  40 mg Subcutaneous Q24H Alian Pretty MD   40 mg at 02/16/21 2150    polyethylene glycol (GLYCOLAX) packet 17 g  17 g Oral Daily PRN Alina Pretty MD   17 g at 02/14/21 2233    acetaminophen (TYLENOL) tablet 650 mg  650 mg Oral Q6H PRN Alina Pretty MD        Or    acetaminophen (TYLENOL) suppository 650 mg  650 mg Rectal Q6H PRN Alina Pretty MD        insulin lispro (HUMALOG) injection vial 0-6 Units  0-6 Units Subcutaneous TID WC Alina Pretty MD   1 Units at 02/16/21 1414    insulin lispro (HUMALOG) injection vial 0-3 Units  0-3 Units Subcutaneous Nightly Alina Pretty MD   2 Units at 02/16/21 2151    ipratropium-albuterol (DUONEB) nebulizer solution 1 ampule  1 ampule Inhalation Q4H Alina Pretty MD   1 ampule at 02/17/21 1100    doxycycline (VIBRAMYCIN) 100 mg in dextrose 5 % 100 mL IVPB  100 mg Intravenous Q12H Alina Pretty  mL/hr at 02/17/21 1355 100 mg at 02/17/21 1355    glucose (GLUTOSE) 40 % oral gel 15 g  15 g Oral PRN Alina Pretty MD        dextrose 50 % IV solution  12.5 g Intravenous PRN Alina Pretty MD        glucagon (rDNA) injection 1 mg  1 mg Intramuscular PRN Alina Pretty MD        dextrose 5 % solution  100 mL/hr Intravenous PRN Alina Pretty MD            Labs:     Recent Labs     02/15/21 3406 02/16/21  0318 02/17/21  0609   WBC 9.3 10.0 10.0   RBC 4.25* 4.39* 4.50*   HGB 11.8* 12.3* 12.5*   HCT 36.8* 38.6* 39.1*   MCV 86.6 87.9 86.9   MCH 27.8 28.0 27.8   MCHC 32.1* 31.9* 32.0*    200 197     Recent Labs     02/15/21  0306 02/16/21 0318 02/17/21  0609   * 137 136   K 4.6 4.6 4.2   ANIONGAP 11 9 12   CL 99 103 101   CO2 24 25 23   BUN 50* 44* 37*   CREATININE 1.7* 1.5* 1.3*   GLUCOSE 301* 281* 181*   CALCIUM 7.9* 8.3* 8.5*     Recent Labs     02/15/21  0306 02/16/21  0318 02/17/21  0609   MG 1.6 1.6 1.6     Recent Labs     02/15/21  0306 02/16/21 0318 02/17/21  0609   AST 53* 55* 33   ALT 59* 79* 68*   BILITOT 0.3 0.3 0.3   ALKPHOS 95 107 88     ABGs:No results for input(s): PH, PO2, PCO2, HCO3, BE, O2SAT in the last 72 hours. Troponin T:   No results for input(s): TROPONINI in the last 72 hours. INR: No results for input(s): INR in the last 72 hours. Lactic Acid:   No results for input(s): LACTA in the last 72 hours. Objective:   Vitals: BP (!) 170/86   Pulse 76   Temp 97.5 °F (36.4 °C) (Temporal)   Resp 18   Ht 6' (1.829 m)   Wt 238 lb (108 kg)   SpO2 94%   BMI 32.28 kg/m²   24HR INTAKE/OUTPUT:      Intake/Output Summary (Last 24 hours) at 2/17/2021 1433  Last data filed at 2/17/2021 1401  Gross per 24 hour   Intake 420 ml   Output 1775 ml   Net -1355 ml     General appearance: alert and cooperative with exam  HEENT: AT/NC  Lungs: BLAE, +mild diffuse wheezing, +coarse breath sounds bilaterally  Heart: RRR  Abdomen: BS+, soft, NT, ND  Extremities: pulses+  Neurologic: Alert, gross motor function intact  Skin: warm    Assessment and Plan: Active Problems:    Sepsis ([address]  Resolved Problems:    * No resolved hospital problems. *    Pneumonia/COPD Exacerbation: Broad spectrum antibiotics - Day 6 Vancomycin/Cefepime. Day 5 Doxycycline. Bronchodilators. Wean O2 as tolerated. Elevated troponin: Cardiology has seen during admission. Further workup including likely stress test as outpatient. ANA/CKD III: Monitor BMP. DM: HbA1c 6.2. Monitor BG and adjust medications PRN. Supportive management. PT/OT/SLP.     Advance Directive: Full Code    DVT prophylaxis: Lovenox    Discharge planning: TBD - needs to be weaned from oxygen      Signed:  Kate Patten MD 2/17/2021 2:33 PM  Rounding Hospitalist

## 2021-02-17 NOTE — PROGRESS NOTES
02/17/21 1434   Restrictions/Precautions   Restrictions/Precautions Fall Risk   Required Braces or Orthoses? No   General   Chart Reviewed Yes   Additional Pertinent Hx recent hospital admit with PNA   Family / Caregiver Present No   Subjective   Subjective Patient in bed agrees to therapy   Pain Screening   Patient Currently in Pain Denies   Intervention List Patient able to continue with treatment   Oxygen Therapy   SpO2 94 %   O2 Device Nasal cannula   O2 Flow Rate (L/min) 5 L/min   Patient Observation   Observations Spo2  Post gait patient walked on 6L   Pre Treatment Pain Screening   Pain at present 0   Scale Used Numeric Score   Intervention List Patient able to continue with treatment   Orientation   Overall Orientation Status WFL   Bed Mobility   Supine to Sit Modified independent   Transfers   Sit to Stand Contact guard assistance   Stand to sit Contact guard assistance   Bed to Chair Contact guard assistance   Ambulation   Ambulation? Yes   Ambulation 1   Surface level tile   Device Rolling Walker   Other Apparatus O2  (6L for gait)   Assistance Contact guard assistance   Quality of Gait steady with RW   Gait Deviations Slow Nae;Decreased step length   Distance 120'   Comments Patient in chair post gait.   Discussed with NSG Spo2  numbers  decided  to leave patient at 5L NC   Activity Tolerance   Activity Tolerance Patient Tolerated treatment well   Safety Devices   Type of devices Left in chair;Chair alarm in place;Call light within reach   Physical Therapy    Electronically signed by Deepak Anderson PTA on 2/17/2021 at 2:42 PM

## 2021-02-17 NOTE — CONSULTS
Palliative Care:  Pt is resting in bed, Alert and oriented. Talks with me about his recent admission and transfer to rehab. Pt was sent home after rehab stay. He returns to the hospital d/t fever, SOA. Past Medical History:        Past Medical History:   Diagnosis Date    Acute liver failure without hepatic coma 10/23/2018    Back pain     \"with tired legs as a result\"    Bladder cancer ([facility] [address] 12/19/2018    Blood circulation, collateral     Carotid arterial disease (HCC)     recent surgery    CKD (chronic kidney disease), 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/2018    GERD (gastroesophageal reflux disease)     Hyperlipidemia     Hypertension     Hypertension     Palliative care patient 10/23/2018    Pneumonia due to infectious organism 11/06/2018    Primary osteoarthritis of left knee 10/14/2018    PVD [facility]peripheral vascular disease) ([facility]     Tremor     Tremor on Right side x 1-2 weeks per stepdaughter    Type 2 diabetes mellitus with complication, without long-term current use of insulin ([facility] [address] 1/21/2021       Advance Directives:   No AD, offered assistance to complete, pt states \"No\". Pain/Other Symptoms:   Denies     Activity:   As shyla. PT here now to work with pt. Psychological/Spiritual:   Pt tells me he and his wife have good support at home. Plan:   ABX, PT/OT/SLP, increase activity, IVF, supportive care    Patient/family discussion r/t goals:     Pt tells me his goal is to return to his home with his spouse. He states he is able to care for his needs. Ambs independently, able to dress, put on his shoes, sower, etc without assist.  Pt does not use O2 at home. Palliative will follow for support, goals of care.              Electronically signed by Kalia Christianson RN on 2/17/2021 at 1:54 PM

## 2021-02-17 NOTE — PROGRESS NOTES
INFECTIOUS DISEASES PROGRESS NOTE    Patient:  Brock Elkins  YOB: 1940  MRN: [id number]   Admit date: 2/12/2021   Admitting Physician: Carmela Iniguez MD  Primary Care Physician: Concepcion Ho MD    CHIEF COMPLAINT:   \"About to walk with therapy\"       Interval History: Jamil Medrano RN and Juliette Jules PTA about to get patient up and he is going to try to go for a walk. Patient reports he had some trouble verbalizing his thoughts this morning. He has been seen by speech therapy. His cough is still nonproductive    Allergies: Allergies   Allergen Reactions    Eliquis [Apixaban] Other (See Comments)     \"almost bled to death\"    Promethazine Hcl Other (See Comments)     He became encephalopathic for several hours and was not responsive.        Current Meds:     vancomycin (VANCOCIN) 1,250 mg in dextrose 5 % 250 mL IVPB, Q24H      hydrALAZINE (APRESOLINE) tablet 25 mg, 3 times per day      technetium sestamibi (CARDIOLITE) injection 10 millicurie, ONCE PRN      technetium sestamibi (CARDIOLITE) injection 30 millicurie, ONCE PRN      guaiFENesin (ROBITUSSIN) 100 MG/5ML syrup 200 mg, Q4H PRN      Benzocaine-Menthol (CEPACOL) 1 lozenge, Q2H PRN      phenol 1.4 % mouth spray 1 spray, Q2H PRN      docusate sodium (COLACE) capsule 100 mg, Daily      senna (SENOKOT) tablet 8.6 mg, Nightly      lactulose (CHRONULAC) 10 GM/15ML solution 10 g, TID PRN      bisacodyl (DULCOLAX) suppository 10 mg, Daily PRN      guaiFENesin (MUCINEX) extended release tablet 1,200 mg, BID      budesonide (PULMICORT) nebulizer suspension 500 mcg, BID      Arformoterol Tartrate (BROVANA) nebulizer solution 15 mcg, BID      0.9 % sodium chloride infusion, Continuous      ceFEPIme (MAXIPIME) 2,000 mg in sterile water 20 mL IV syringe, Q12H      vancomycin (VANCOCIN) intermittent dosing (placeholder), RX Placeholder      aspirin EC tablet 81 mg, Daily      atorvastatin (LIPITOR) tablet 20 mg, Daily   metoprolol succinate (TOPROL XL) extended release tablet 100 mg, Daily      NIFEdipine (PROCARDIA XL) extended release tablet 60 mg, Daily      pantoprazole (PROTONIX) tablet 40 mg, Daily      tamsulosin (FLOMAX) capsule 0.4 mg, Daily      trospium (SANCTURA) tablet 20 mg, Nightly      sodium chloride flush 0.9 % injection 10 mL, 2 times per day      sodium chloride flush 0.9 % injection 10 mL, PRN      enoxaparin (LOVENOX) injection 40 mg, Q24H      polyethylene glycol (GLYCOLAX) packet 17 g, Daily PRN      acetaminophen (TYLENOL) tablet 650 mg, Q6H PRN    Or      acetaminophen (TYLENOL) suppository 650 mg, Q6H PRN      insulin lispro (HUMALOG) injection vial 0-6 Units, TID WC      insulin lispro (HUMALOG) injection vial 0-3 Units, Nightly      ipratropium-albuterol (DUONEB) nebulizer solution 1 ampule, Q4H      doxycycline (VIBRAMYCIN) 100 mg in dextrose 5 % 100 mL IVPB, Q12H      glucose (GLUTOSE) 40 % oral gel 15 g, PRN      dextrose 50 % IV solution, PRN      glucagon (rDNA) injection 1 mg, PRN      dextrose 5 % solution, PRN        Review of Systems   General: No fever  Respiratory: Cough remains nonproductive but better    Vital Signs:  BP (!) 170/86   Pulse 76   Temp 97.5 °F (36.4 °C) (Temporal)   Resp 18   Ht 6' (1.829 m)   Wt 238 lb (108 kg)   SpO2 94%   BMI 32.28 kg/m²   Temp (24hrs), Av.1 °F (36.2 °C), Min:96 °F (35.6 °C), Max:98 °F (36.7 °C)      Physical Exam  General: Patient is an elderly male lying in bed sitting up some and in no acute distress  HEENT: Sclera anicteric. O2 per nasal cannula in place. Ltanya Blinks, PTA went to get a mask for the patient  Respiratory: Effort even and unlabored. He is not conversationally dyspneic. Neuro: Alert and oriented, speech clear. He is hard of hearing.   He did not appear to have any word finding difficulties during my visit  Psych: Pleasant cooperative        LAB RESULTS:    CBC with DIFF:  Recent Labs     02/15/21 0542 02/16/21  0318 02/17/21  0609   WBC 9.3 10.0 10.0   RBC 4.25* 4.39* 4.50*   HGB 11.8* 12.3* 12.5*   HCT 36.8* 38.6* 39.1*   MCV 86.6 87.9 86.9   MCH 27.8 28.0 27.8   MCHC 32.1* 31.9* 32.0*   RDW 12.8 13.2 13.3    200 197   MPV 11.5 11.0 11.3   NEUTOPHILPCT 89.1* 82.2* 75.3*   LYMPHOPCT 4.7* 6.2* 10.9*   MONOPCT 3.3 5.7 7.9   EOSRELPCT 0.0 0.0 0.0   BASOPCT 0.1 0.2 0.5   NEUTROABS 8.3* 8.2* 7.5   LYMPHSABS 0.4* 0.6* 1.1   MONOSABS 0.30 0.60 0.80   EOSABS 0.00 0.00 0.00   BASOSABS 0.00 0.00 0.10       CMP/BMP:  Recent Labs     02/15/21  0306 02/16/21  0318 02/17/21  0609   * 137 136   K 4.6 4.6 4.2   CL 99 103 101   CO2 24 25 23   ANIONGAP 11 9 12   GLUCOSE 301* 281* 181*   BUN 50* 44* 37*   CREATININE 1.7* 1.5* 1.3*   LABGLOM 39* 45* 53*   CALCIUM 7.9* 8.3* 8.5*   PROT 5.2* 5.3* 5.6*   LABALBU 2.9* 3.1* 3.0*   BILITOT 0.3 0.3 0.3   ALKPHOS 95 107 88   ALT 59* 79* 68*   AST 53* 55* 33                  Respiratory Panel, Molecular, with COVID-19 (Restricted: peds pts or suitable admitted adults) [[id number]] Collected: 02/12/21 8804   Order Status: Completed Specimen: Nasopharyngeal Updated: 02/12/21 4630     Adenovirus by PCR Not Detected     Bordetella parapertussis by PCR Not Detected     Bordetella pertussis by PCR Not Detected     Chlamydophilia pneumoniae by PCR Not Detected     Coronavirus 229E by PCR Not Detected     Coronavirus HKU1 by PCR Not Detected     Coronavirus NL63 by PCR Not Detected     Coronavirus OC43 by PCR Not Detected     SARS-CoV-2, PCR Not Detected     Comment: This test has been authorized by FDA under an   Emergency Use Authorization (EUA). This test is only authorized for the duration of the   time of declaration that circumstances exist justifying the   authorization of the emergency use of in vitro diagnostic   testing for detection of the SARS-CoV-2 virus   and/or diagnosis of COVID-19 infection under   section 564 (b)(1) of the Act, 21 U. S.C. 318JEF-1 (b) (1), ORDER#: [id number]                          ORDERED BY: CELSA RUCKER   SOURCE: Blood Antecubital-Rig              COLLECTED:  02/12/21 15:46   ANTIBIOTICS AT [facility]. :                      RECEIVED :  02/12/21 15:55   CALL  [place]  [facility] tel. ,   Microbiology results called to and read back by Giorgio Mclain RN , 7th,   02/13/2021 12:56, by [facility]   Culture, Blood 2 [[id number]] (Abnormal)  Collected: 02/12/21 1615   Order Status: Completed Specimen: Blood Updated: 02/15/21 0633    Organism Staphylococcus epidermidisAbnormal     Culture, Blood 2 Isolated from Aerobic bottle   Narrative:     ORDER#: [id number]                          ORDERED BY: CELSA Caballero   SOURCE: Blood Antecubital-Rig              COLLECTED:  02/12/21 16:15   ANTIBIOTICS AT [facility]. :                      RECEIVED :  02/12/21 16:21   [facility] ,   Microbiology results called to and read back by Giorgio Mclain RN 7th, 02/13/2021            RADIOLOGY      Ct Chest Wo Contrast    Result Date: 2/13/2021 EXAMINATION: CT CHEST WO CONTRAST 2/13/2021 2:17 PM HISTORY: Shortness of breath, bilateral pneumonia. DOSE: 976 mGycm. Automatic exposure control was utilized in an effort to use as little radiation as possible, without compromising image quality. REPORT: Spiral CT of the chest was performed without contrast, reconstructed coronal and sagittal images are also reviewed. COMPARISON: Portable chest x-ray to 12/20/2021. Review of lung windows demonstrates consolidative infiltrates in both lungs, on the right, this includes extensive infiltrate surrounding the right hilum, with contiguous infiltrate in the right upper lobe, with air bronchograms, there is also consolidation of the right lower lobe with air bronchograms and focal subpleural nodular infiltrates are identified in the right middle lobe and right upper lobe focally. In the left lung, there is a subpleural infiltrate in the left upper lobe along the major fissure, with mild paraseptal emphysematous changes. This is contiguous with mixed groundglass and consolidative infiltrates in the lingula and there is consolidation of the left lower lobe with air bronchograms, with associated bronchial wall thickening. A small amount of left perihilar infiltrate is also present. No pneumothorax is identified, there is a trace left pleural effusion, small right pleural effusion. No lung abscess or cavitation is seen in association with the areas of pneumonia. Soft tissue windows show a 12 mm low-attenuation lesion in the right thyroid lobe, which is most likely benign. There are normal-sized shotty appearing lymph nodes within the mediastinum without measurable lymphadenopathy. A small amount calcified plaques noted within the aortic arch and there is heavy calcified plaque within the coronary arteries. Heart size is normal. There is trace pericardial fluid. The visualized upper abdomen shows no acute abnormality. There is mild bilateral gynecomastia.  Review of bone windows shows advanced degenerative changes in the visualized upper lumbar spine. Consolidating infiltrates within both lungs, greatest in the right lower lobe and most compatible with bilateral pneumonia, there is a small right pleural effusion and trace left pleural effusion. No pneumothorax is identified. There is no lung abscess or areas of cavitation. Mild underlying changes of paraseptal emphysema are noted. No measurable intrathoracic lymphadenopathy is identified. Signed by Dr Grey Barrera on 2/13/2021 2:28 PM    Us Renal Complete    Result Date: 2/13/2021  EXAMINATION: US RENAL COMPLETE 2/13/2021 3:16 PM HISTORY: Acute kidney injury. Report: Sonographic images of the kidneys were obtained. COMPARISON: CT of the abdomen and pelvis with without contrast 7/31/2020. Ultrasound of the kidneys 10/23/2018. The right kidney measures 11.6 x 5.5 x 6.0 cm and has normal cortical echogenicity, with a cortical thickness that averages 12 mm. No solid mass is identified. There are 2 cysts in the right kidney appear benign, including one at the interpolar level measures 1.3 x 1.4 x 1.0 cm (previous measurement of 0.9 x 0.8 x 0.9 cm) and one at the inferior pole that measures 2.5 x 2.9 x 2.1 cm. This cyst previously measured 2.7 x 2.2 x 2.0 cm and is essentially unchanged Color Doppler images demonstrate vascular flow within the right kidney. The left kidney measures 10.4 x 4.5 x 4.0 cm and has normal morphology and cortical echogenicity without evidence of hydronephrosis or solid mass. There is a benign-appearing cyst at the inferior pole that measures 1 cm, not clearly seen on the previous ultrasound. Color Doppler images demonstrate vascular flow within the left kidney. Additional small cysts are seen in the kidneys on prior CT but poorly visualized on today's examination. The bladder is within normal limits. Bilateral renal cysts as detailed above, with a benign appearance. Some smaller cysts seen in the kidneys on previous CT are not visualized on today's examination No hydronephrosis is identified. Both kidneys have normal cortical echogenicity. The bladder is unremarkable. Signed by Dr Sana Barrera on 2/13/2021 3:21 PM    Xr Chest Portable    Result Date: 2/12/2021  EXAMINATION: XR CHEST PORTABLE 2/12/2021 4:13 PM HISTORY: Shortness of breath, hypoxia, recent pneumonia COMPARISON: 1/18/2021 FINDINGS: The heart appears normal in size. Atherosclerotic calcifications are seen in the aorta. Bilateral airspace opacities are present with relative sparing of the upper lobes, markedly increased compared to the prior exam. There is no appreciable pneumothorax or definite pleural effusion. Bilateral pneumonia, though worse when compared to the prior exam. Follow-up PA and lateral chest radiograph to recommended in 6-8 weeks to document resolution.  Signed by Dr Alice Nielsen on 2/12/2021 4:14 PM                  Patient Active Problem List   Diagnosis    Diverticulitis of large intestine with perforation without bleeding    Generalized abdominal pain    Colonic diverticular abscess    Bilateral carotid artery stenosis    Atherosclerosis of native artery of both lower extremities with intermittent claudication ([address]    Carotid stenosis, asymptomatic, left    Primary osteoarthritis of left knee    Arthritis of knee    Essential hypertension    Pure hypercholesterolemia    Slow transit constipation    Iron deficiency anemia    GERD (gastroesophageal reflux disease)    Acute liver failure without hepatic coma    CHF (congestive heart failure) (HCC)    Bilateral pleural effusion    Palliative care patient    Shock liver    Acute renal failure ([address]    BPH associated with nocturia    Bleeding diathesis ([address]    Epistaxis    Acute blood loss anemia    Melena    Hypocalcemia  Pneumonia due to infectious organism    Bladder [facility]    Postoperative pain    History of bladder cancer    Severe sepsis (HCC)    Acute on chronic respiratory failure (HCC)    UTI (urinary tract infection)    Acute on chronic kidney failure (HCC)    Hypoxemia    Metabolic acidosis    Acidosis, metabolic, with respiratory acidosis    Acute respiratory failure (HCC)    Type 2 diabetes mellitus with complication, without long-term current use of insulin (HCC)    Weakness    Other dysphagia    Low back pain    Sepsis ([facility])       IMPRESSION:  Positive blood cultures - Staph epi in one bottle of one set and second set - drawn in ED with difficulty per patient - also one bottle of one set - referred to other ( ie not identified)- Suspect drawn from right antecubital iv site in ED - contaminant. Not consistent with pulmonary pathogen     Bilateral pneumonia right greater than left (patient had bilateral infiltrates right greater than left on chest x-ray during his January admission)    Acute kidney injuryimproved      RECOMMENDATIONS :  Continue Mucinex   contiunue broad abxnoted vancomycin to be increased given improved kidney function  Suspect patient may need a longer course of IV, possibly 14 days, since he improved during last admission but worsened when transition to p.o.   Continue trying to mobilize patient        Viktoria Nicole MD

## 2021-02-17 NOTE — PROGRESS NOTES
Speech Language Pathology  Facility/Department: [address] CARE   CLINICAL BEDSIDE SWALLOW EVALUATION  SPEECH PRODUCTION EVALUATION     NAME: Vannie Fleischer  : 1940  MRN: [id number]    ADMISSION DATE: 2021  ADMITTING DIAGNOSIS: has Diverticulitis of large intestine with perforation without bleeding; Generalized abdominal pain; Colonic diverticular abscess; Bilateral carotid artery stenosis; Atherosclerosis of native artery of both lower extremities with intermittent claudication ([address] Carotid stenosis, asymptomatic, left; Primary osteoarthritis of left knee; Arthritis of knee; Essential hypertension; Pure hypercholesterolemia; Slow transit constipation; Iron deficiency anemia; GERD (gastroesophageal reflux disease); Acute liver failure without hepatic coma; CHF (congestive heart failure) ([address] Bilateral pleural effusion; Palliative care patient; Shock liver; Acute renal failure (HCC); BPH associated with nocturia; Bleeding diathesis ([address] Epistaxis; Acute blood loss anemia; Melena; Hypocalcemia; Pneumonia due to infectious organism; Bladder cancer ([address] Postoperative pain; History of bladder cancer; Severe sepsis ([address] Acute on chronic respiratory failure ([address] UTI (urinary tract infection); Acute on chronic kidney failure ([address] Hypoxemia; Metabolic acidosis; Acidosis, metabolic, with respiratory acidosis; Acute respiratory failure ([address] Type 2 diabetes mellitus with complication, without long-term current use of insulin ([address] Weakness; Other dysphagia;  Low back pain; and Sepsis ([address] on their problem list.    Date of Eval: 2021  Evaluating Therapist: Mark Watters    Current Diet level:  Regular solid consistency with thin liquids    Reason for Referral Goal 2: Patient staff will follow swallow safety recommendations to decrease risk of penetration/aspiration during PO intake. Goal 3: Re-assessment of swallow function for potential diet upgrade. Goal 4: Trial oral motor, lingual, and pharyngeal exercises with provision of mod cues/prompts. Goal 5: Monitor speech production. Goal 6: Potential cognitive-linguistic eval      General  Chart Reviewed: Yes  Behavior/Cognition: Alert; Cooperative  O2 Device: Nasal Cannula  Communication Observation: (Assessed patient's speech production. Patient exhibits decreased labial movements in running speech as well as slow, decreased lingual movements during verbalizations. SLP ranked functional intelligibility for unfamiliar listeners at % in utterances with background noise present. It is noted that delayed responses were observed with mild fragmentations noted where the patient started expressions but then discontinued.)  Follows Directions: While delayed, patient demonstrated ability to follow simple 1 step auditory commands at independent level. Dentition: Some missing dentition  Patient Positioning: Upright in bed  Consistencies Administered: Ice chips;Dysphagia Pureed (Dysphagia I); Regular solid; Honey - cup;Nectar - cup;  Thin - cup      Oral Motor Examination   Labial ROM: (Adequate during volitional labial retraction trials and labial protrusion trials.)  Labial Strength: (Adequate during labial compression trials.)  Labial Coordination: (Adequate movements were noted.)  Lingual ROM: (Decreased during lingual extension trials with no full point achieved; decreased during lingual elevation trials without use of accessory jaw movement; adequate movements noted bilaterally.)  Lingual Strength: (Decreased with moderate fasciculations noted during lingual extension trials.)  Lingual Coordination: (Slowed movements were noted.)     Assessed patient's swallowing function with the following observations noted: Oral Phase  Mastication: Ice chips;Regular solid (Patient exhibited decreased rotary jaw movement during oral prep of ice chip trials and regular solid consistency trials presented by SLP.)  Suspected Premature Bolus Loss: Ice chips;Puree;Regular solid; Nectar - cup; Thin - cup (Oral transit of ice chip trials and puree consistency trials, all presented by SLP, varied from 1-4 seconds. Patient exhibited slow oral transit of regular solid consistency trials. Patient exhibited fast oral transit of thin H2O trials presented via cup by SLP.)  Decreased Oral Transit: Regular solid (Moderate oral cavity residue was noted post swallows; residue was slow but cleared from the mouth with additional dry swallows.)  Oral Phase - Comment: Min puree consistency residue was noted posts wallows; residue cleared from the mouth with additional dry swallows. Oral transit of honey thick liquid trials and nectar thick liquid trials, all presented via cup by SLP, primarily measured 1-2 seconds in length. Pharyngeal Phase  Suspected Swallow Delay: Ice chips;Puree;Regular solid; Thin - cup (Suspect secondary to oral transit times.)  Laryngeal Elevation: (Patient exhibited sluggish, moderate-severely decreased laryngeal elevation for swallow airway protection.)  Pharyngeal Phase - Comment: No outward S/S penetration/aspiration was noted with any ice chip trial, puree consistency trial, regular solid consistency trial, honey thick liquid trial, nectar thick liquid trial, or thin H2O trial presented during assessment this date. At this time, would trial soft and bite sized consistency and nectar thick liquids. Recommend meds in pudding/applesauce. If patient receives mouth care prior to intake, okay for ice chips and small sips thin H2O IN BETWEEN MEALS for comfort. Will continue to follow.     Electronically signed by HALINA Jorge on 2/17/2021 at 12:33 PM

## 2021-02-18 ENCOUNTER — APPOINTMENT (OUTPATIENT)
Dept: CT IMAGING | Age: 81
DRG: 871 | End: 2021-02-18
Payer: MEDICARE

## 2021-02-18 LAB
ALBUMIN SERPL-MCNC: 3.1 G/DL (ref 3.5–5.2)
ALP BLD-CCNC: 83 U/L (ref 40–130)
ALT SERPL-CCNC: 52 U/L (ref 5–41)
ANION GAP SERPL CALCULATED.3IONS-SCNC: 9 MMOL/L (ref 7–19)
AST SERPL-CCNC: 22 U/L (ref 5–40)
BASOPHILS ABSOLUTE: 0.1 K/UL (ref 0–0.2)
BASOPHILS RELATIVE PERCENT: 0.4 % (ref 0–1)
BILIRUB SERPL-MCNC: 0.5 MG/DL (ref 0.2–1.2)
BLOOD CULTURE, ROUTINE: NORMAL
BUN BLDV-MCNC: 34 MG/DL (ref 8–23)
CALCIUM SERPL-MCNC: 8.7 MG/DL (ref 8.8–10.2)
CHLORIDE BLD-SCNC: 99 MMOL/L (ref 98–111)
CO2: 27 MMOL/L (ref 22–29)
CREAT SERPL-MCNC: 1.3 MG/DL (ref 0.5–1.2)
CULTURE, BLOOD 2: NORMAL
EOSINOPHILS ABSOLUTE: 0 K/UL (ref 0–0.6)
EOSINOPHILS RELATIVE PERCENT: 0 % (ref 0–5)
GFR AFRICAN AMERICAN: >59
GFR NON-AFRICAN AMERICAN: 53
GLUCOSE BLD-MCNC: 104 MG/DL (ref 74–109)
GLUCOSE BLD-MCNC: 107 MG/DL (ref 70–99)
GLUCOSE BLD-MCNC: 130 MG/DL (ref 70–99)
GLUCOSE BLD-MCNC: 148 MG/DL (ref 70–99)
GLUCOSE BLD-MCNC: 182 MG/DL (ref 70–99)
HCT VFR BLD CALC: 41.3 % (ref 42–52)
HEMOGLOBIN: 13.3 G/DL (ref 14–18)
IMMATURE GRANULOCYTES #: 0.6 K/UL
LYMPHOCYTES ABSOLUTE: 1.2 K/UL (ref 1.1–4.5)
LYMPHOCYTES RELATIVE PERCENT: 10.9 % (ref 20–40)
MAGNESIUM: 1.6 MG/DL (ref 1.6–2.4)
MCH RBC QN AUTO: 27.7 PG (ref 27–31)
MCHC RBC AUTO-ENTMCNC: 32.2 G/DL (ref 33–37)
MCV RBC AUTO: 86 FL (ref 80–94)
MONOCYTES ABSOLUTE: 0.8 K/UL (ref 0–0.9)
MONOCYTES RELATIVE PERCENT: 7.2 % (ref 0–10)
NEUTROPHILS ABSOLUTE: 8.5 K/UL (ref 1.5–7.5)
NEUTROPHILS RELATIVE PERCENT: 75.8 % (ref 50–65)
PDW BLD-RTO: 13.3 % (ref 11.5–14.5)
PERFORMED ON: ABNORMAL
PLATELET # BLD: 224 K/UL (ref 130–400)
PMV BLD AUTO: 11.3 FL (ref 9.4–12.4)
POTASSIUM SERPL-SCNC: 4.1 MMOL/L (ref 3.5–5)
RBC # BLD: 4.8 M/UL (ref 4.7–6.1)
SODIUM BLD-SCNC: 135 MMOL/L (ref 136–145)
TOTAL PROTEIN: 5.6 G/DL (ref 6.6–8.7)
WBC # BLD: 11.2 K/UL (ref 4.8–10.8)

## 2021-02-18 PROCEDURE — 6360000002 HC RX W HCPCS: Performed by: INTERNAL MEDICINE

## 2021-02-18 PROCEDURE — 2700000000 HC OXYGEN THERAPY PER DAY

## 2021-02-18 PROCEDURE — 2580000003 HC RX 258: Performed by: INTERNAL MEDICINE

## 2021-02-18 PROCEDURE — 94640 AIRWAY INHALATION TREATMENT: CPT

## 2021-02-18 PROCEDURE — 92526 ORAL FUNCTION THERAPY: CPT

## 2021-02-18 PROCEDURE — 6370000000 HC RX 637 (ALT 250 FOR IP): Performed by: INTERNAL MEDICINE

## 2021-02-18 PROCEDURE — 83735 ASSAY OF MAGNESIUM: CPT

## 2021-02-18 PROCEDURE — 2140000000 HC CCU INTERMEDIATE R&B

## 2021-02-18 PROCEDURE — 82947 ASSAY GLUCOSE BLOOD QUANT: CPT

## 2021-02-18 PROCEDURE — 80053 COMPREHEN METABOLIC PANEL: CPT

## 2021-02-18 PROCEDURE — 85025 COMPLETE CBC W/AUTO DIFF WBC: CPT

## 2021-02-18 PROCEDURE — 36415 COLL VENOUS BLD VENIPUNCTURE: CPT

## 2021-02-18 PROCEDURE — 70450 CT HEAD/BRAIN W/O DYE: CPT

## 2021-02-18 PROCEDURE — 2500000003 HC RX 250 WO HCPCS: Performed by: INTERNAL MEDICINE

## 2021-02-18 PROCEDURE — 92523 SPEECH SOUND LANG COMPREHEN: CPT

## 2021-02-18 RX ADMIN — SODIUM CHLORIDE, PRESERVATIVE FREE 10 ML: 5 INJECTION INTRAVENOUS at 22:28

## 2021-02-18 RX ADMIN — NIFEDIPINE 60 MG: 60 TABLET, FILM COATED, EXTENDED RELEASE ORAL at 08:40

## 2021-02-18 RX ADMIN — IPRATROPIUM BROMIDE AND ALBUTEROL SULFATE 1 AMPULE: 2.5; .5 SOLUTION RESPIRATORY (INHALATION) at 14:46

## 2021-02-18 RX ADMIN — DOCUSATE SODIUM 100 MG: 100 CAPSULE, LIQUID FILLED ORAL at 08:41

## 2021-02-18 RX ADMIN — DOXYCYCLINE 100 MG: 100 INJECTION, POWDER, LYOPHILIZED, FOR SOLUTION INTRAVENOUS at 02:23

## 2021-02-18 RX ADMIN — ASPIRIN 81 MG: 81 TABLET, COATED ORAL at 08:40

## 2021-02-18 RX ADMIN — STANDARDIZED SENNA CONCENTRATE 8.6 MG: 8.6 TABLET ORAL at 22:59

## 2021-02-18 RX ADMIN — ATORVASTATIN CALCIUM 20 MG: 20 TABLET, FILM COATED ORAL at 08:40

## 2021-02-18 RX ADMIN — HYDRALAZINE HYDROCHLORIDE 25 MG: 25 TABLET, FILM COATED ORAL at 05:36

## 2021-02-18 RX ADMIN — TROSPIUM CHLORIDE 20 MG: 20 TABLET, FILM COATED ORAL at 22:59

## 2021-02-18 RX ADMIN — IPRATROPIUM BROMIDE AND ALBUTEROL SULFATE 1 AMPULE: 2.5; .5 SOLUTION RESPIRATORY (INHALATION) at 06:49

## 2021-02-18 RX ADMIN — IPRATROPIUM BROMIDE AND ALBUTEROL SULFATE 1 AMPULE: 2.5; .5 SOLUTION RESPIRATORY (INHALATION) at 18:22

## 2021-02-18 RX ADMIN — CEFEPIME HYDROCHLORIDE 2000 MG: 2 INJECTION, POWDER, FOR SOLUTION INTRAVENOUS at 05:35

## 2021-02-18 RX ADMIN — BUDESONIDE 500 MCG: 0.5 INHALANT RESPIRATORY (INHALATION) at 18:32

## 2021-02-18 RX ADMIN — ARFORMOTEROL TARTRATE 15 MCG: 15 SOLUTION RESPIRATORY (INHALATION) at 06:50

## 2021-02-18 RX ADMIN — VANCOMYCIN HYDROCHLORIDE 1250 MG: 10 INJECTION, POWDER, LYOPHILIZED, FOR SOLUTION INTRAVENOUS at 08:41

## 2021-02-18 RX ADMIN — GUAIFENESIN 1200 MG: 600 TABLET, EXTENDED RELEASE ORAL at 22:59

## 2021-02-18 RX ADMIN — HYDRALAZINE HYDROCHLORIDE 25 MG: 25 TABLET, FILM COATED ORAL at 14:30

## 2021-02-18 RX ADMIN — PANTOPRAZOLE SODIUM 40 MG: 40 TABLET, DELAYED RELEASE ORAL at 08:41

## 2021-02-18 RX ADMIN — IPRATROPIUM BROMIDE AND ALBUTEROL SULFATE 1 AMPULE: 2.5; .5 SOLUTION RESPIRATORY (INHALATION) at 10:21

## 2021-02-18 RX ADMIN — ARFORMOTEROL TARTRATE 15 MCG: 15 SOLUTION RESPIRATORY (INHALATION) at 18:32

## 2021-02-18 RX ADMIN — GUAIFENESIN 1200 MG: 600 TABLET, EXTENDED RELEASE ORAL at 08:40

## 2021-02-18 RX ADMIN — METOPROLOL SUCCINATE 100 MG: 50 TABLET, EXTENDED RELEASE ORAL at 08:41

## 2021-02-18 RX ADMIN — SODIUM CHLORIDE, PRESERVATIVE FREE 10 ML: 5 INJECTION INTRAVENOUS at 08:46

## 2021-02-18 RX ADMIN — SODIUM CHLORIDE, PRESERVATIVE FREE 10 ML: 5 INJECTION INTRAVENOUS at 16:47

## 2021-02-18 RX ADMIN — TAMSULOSIN HYDROCHLORIDE 0.4 MG: 0.4 CAPSULE ORAL at 08:40

## 2021-02-18 RX ADMIN — IPRATROPIUM BROMIDE AND ALBUTEROL SULFATE 1 AMPULE: 2.5; .5 SOLUTION RESPIRATORY (INHALATION) at 01:25

## 2021-02-18 RX ADMIN — IPRATROPIUM BROMIDE AND ALBUTEROL SULFATE 1 AMPULE: 2.5; .5 SOLUTION RESPIRATORY (INHALATION) at 22:30

## 2021-02-18 RX ADMIN — BUDESONIDE 500 MCG: 0.5 INHALANT RESPIRATORY (INHALATION) at 06:49

## 2021-02-18 RX ADMIN — INSULIN LISPRO 1 UNITS: 100 INJECTION, SOLUTION INTRAVENOUS; SUBCUTANEOUS at 18:12

## 2021-02-18 RX ADMIN — DOXYCYCLINE 100 MG: 100 INJECTION, POWDER, LYOPHILIZED, FOR SOLUTION INTRAVENOUS at 12:46

## 2021-02-18 RX ADMIN — ENOXAPARIN SODIUM 40 MG: 40 INJECTION SUBCUTANEOUS at 23:00

## 2021-02-18 RX ADMIN — CEFEPIME HYDROCHLORIDE 2000 MG: 2 INJECTION, POWDER, FOR SOLUTION INTRAVENOUS at 16:43

## 2021-02-18 ASSESSMENT — PAIN SCALES - GENERAL: PAINLEVEL_OUTOF10: 0

## 2021-02-18 NOTE — PROGRESS NOTES
[facility]        Hospitalist Progress Note  2/18/2021 1:02 PM  Subjective:   Admit Date: 2/12/2021  PCP: Norbert Sandoval MD    Chief Complaint: Dyspnea    Subjective: Patient seen and examined at bedside. Complaining of dizziness and foggy feeling in his head. Noted to be acting differently overnight based on nursing documentation. Cumulative Hospital History: 67-year-old obese male with a past medical history of COPD not on home oxygen, CKD stage III, diabetes, hypertension, BPH, h/o bladder CA recently admitted for bilateral pneumonia requiring intubation and mechanical intubation discharged to inpatient rehab (by me) with improvement and eventual discharge home. Patient presents back to the ED with worsening dyspnea found to have worsening bilateral pneumonia on CXR, poor PO intake and associated ANA on CKD and elevated troponin. Cardiology consulted for elevated troponin. Troponin trending down - needs improvement in pulmonary status prior to stress testing or intervention from cardiology. Noted to have 2 of 2 bottles staph bacteremia and infectious disease consulted. ROS: 14 point review of systems is negative except as specifically addressed above. DIET CARDIAC;  Dysphagia Soft and Bite-Sized; Mildly Thick (Nectar)    Intake/Output Summary (Last 24 hours) at 2/18/2021 1302  Last data filed at 2/18/2021 1120  Gross per 24 hour   Intake 120 ml   Output 2075 ml   Net -1955 ml     Medications:   sodium chloride 100 mL/hr at 02/17/21 0540    dextrose       Current Facility-Administered Medications   Medication Dose Route Frequency Provider Last Rate Last Admin    vancomycin (VANCOCIN) 1,250 mg in dextrose 5 % 250 mL IVPB  1,250 mg Intravenous Q24H Mendel Mustard, MD   Stopped at 02/18/21 1119    hydrALAZINE (APRESOLINE) tablet 25 mg  25 mg Oral 3 times per day Mendel Mustard, MD   25 mg at 02/18/21 1464  technetium sestamibi (CARDIOLITE) injection 10 millicurie  10 millicurie Intravenous ONCE PRN Elza Multani MD        technetium sestamibi (CARDIOLITE) injection 30 millicurie  30 millicurie Intravenous ONCE PRN Elza Multani MD        guaiFENesin (ROBITUSSIN) 100 MG/5ML syrup 200 mg  200 mg Oral Q4H PRN Cate Christine MD        Benzocaine-Menthol (CEPACOL) 1 lozenge  1 lozenge Oral Q2H PRN Cate Christine MD        phenol 1.4 % mouth spray 1 spray  1 spray Mouth/Throat Q2H PRN Cate Christine MD        docusate sodium (COLACE) capsule 100 mg  100 mg Oral Daily Cate Christine MD   100 mg at 02/18/21 0841    senna (SENOKOT) tablet 8.6 mg  1 tablet Oral Nightly Cate Christine MD   8.6 mg at 02/17/21 2050    lactulose (CHRONULAC) 10 GM/15ML solution 10 g  10 g Oral TID PRN Cate Christine MD   10 g at 02/15/21 0849    bisacodyl (DULCOLAX) suppository 10 mg  10 mg Rectal Daily PRN Cate Christine MD        guaiFENesin [facility]) extended release tablet 1,200 mg  1,200 mg Oral BID Cristina Harrington MD   1,200 mg at 02/18/21 0840    budesonide (PULMICORT) nebulizer suspension 500 mcg  0.5 mg Nebulization BID Cate Christine MD   500 mcg at 02/18/21 9205    Arformoterol Tartrate (BROVANA) nebulizer solution 15 mcg  15 mcg Nebulization BID Cate Christine MD   15 mcg at 02/18/21 0650    0.9 % sodium chloride infusion   Intravenous Continuous Cate Christine  mL/hr at 02/17/21 0540 New Bag at 02/17/21 0540    ceFEPIme (MAXIPIME) 2,000 mg in sterile water 20 mL IV syringe  2,000 mg Intravenous Q12H Karey Rodriguez MD   2,000 mg at 02/18/21 0535    vancomycin (VANCOCIN) intermittent dosing (placeholder)   Other RX Placeholder Karey Rodriguez MD        aspirin EC tablet 81 mg  81 mg Oral Daily Karey Rodriguez MD   81 mg at 02/18/21 0840    atorvastatin (LIPITOR) tablet 20 mg  20 mg Oral Daily Karey Rodriguez MD   20 mg at 02/18/21 0840  metoprolol succinate (TOPROL XL) extended release tablet 100 mg  100 mg Oral Daily Nilam Porras MD   100 mg at 02/18/21 0841    NIFEdipine (PROCARDIA XL) extended release tablet 60 mg  60 mg Oral Daily Nilam Porras MD   60 mg at 02/18/21 0840    pantoprazole (PROTONIX) tablet 40 mg  40 mg Oral Daily Nilam Porras MD   40 mg at 02/18/21 0841    tamsulosin (FLOMAX) capsule 0.4 mg  0.4 mg Oral Daily Nilam Porras MD   0.4 mg at 02/18/21 0840    trospium (SANCTURA) tablet 20 mg  20 mg Oral Nightly Nilam Porras MD   20 mg at 02/17/21 2050    sodium chloride flush 0.9 % injection 10 mL  10 mL Intravenous 2 times per day Nilam Porras MD   10 mL at 02/18/21 0846    sodium chloride flush 0.9 % injection 10 mL  10 mL Intravenous PRN Nilam Porras MD        enoxaparin (LOVENOX) injection 40 mg  40 mg Subcutaneous Q24H Nilam Porras MD   40 mg at 02/17/21 2050    polyethylene glycol (GLYCOLAX) packet 17 g  17 g Oral Daily PRN Nilam Porras MD   17 g at 02/14/21 2233    acetaminophen (TYLENOL) tablet 650 mg  650 mg Oral Q6H PRN Nilam Porras MD        Or    acetaminophen (TYLENOL) suppository 650 mg  650 mg Rectal Q6H PRN Nilam Porras MD        insulin lispro (HUMALOG) injection vial 0-6 Units  0-6 Units Subcutaneous TID WC Nilam Porras MD   3 Units at 02/17/21 1702    insulin lispro (HUMALOG) injection vial 0-3 Units  0-3 Units Subcutaneous Nightly Nilam Porras MD   1 Units at 02/17/21 2054    ipratropium-albuterol (DUONEB) nebulizer solution 1 ampule  1 ampule Inhalation Q4H Nilam Porras MD   1 ampule at 02/18/21 1021    doxycycline (VIBRAMYCIN) 100 mg in dextrose 5 % 100 mL IVPB  100 mg Intravenous Q12H Nilam Porras  mL/hr at 02/18/21 1246 100 mg at 02/18/21 1246    glucose (GLUTOSE) 40 % oral gel 15 g  15 g Oral PRN Nilam Porras MD        dextrose 50 % IV solution  12.5 g Intravenous PRN Nilam Porras MD  glucagon (rDNA) injection 1 mg  1 mg Intramuscular PRN Candelario Sadler MD        dextrose 5 % solution  100 mL/hr Intravenous PRN Candelario Sadler MD            Labs:     Recent Labs     02/16/21 0318 02/17/21 0609 02/18/21  0444   WBC 10.0 10.0 11.2*   RBC 4.39* 4.50* 4.80   HGB 12.3* 12.5* 13.3*   HCT 38.6* 39.1* 41.3*   MCV 87.9 86.9 86.0   MCH 28.0 27.8 27.7   MCHC 31.9* 32.0* 32.2*    197 224     Recent Labs     02/16/21 0318 02/17/21 0609 02/18/21  0444    136 135*   K 4.6 4.2 4.1   ANIONGAP 9 12 9    101 99   CO2 25 23 27   BUN 44* 37* 34*   CREATININE 1.5* 1.3* 1.3*   GLUCOSE 281* 181* 104   CALCIUM 8.3* 8.5* 8.7*     Recent Labs     02/16/21 0318 02/17/21  0609 02/18/21  0444   MG 1.6 1.6 1.6     Recent Labs     02/16/21 0318 02/17/21  0609 02/18/21  0444   AST 55* 33 22   ALT 79* 68* 52*   BILITOT 0.3 0.3 0.5   ALKPHOS 107 88 83     ABGs:No results for input(s): PH, PO2, PCO2, HCO3, BE, O2SAT in the last 72 hours. Troponin T:   No results for input(s): TROPONINI in the last 72 hours. INR: No results for input(s): INR in the last 72 hours. Lactic Acid:   No results for input(s): LACTA in the last 72 hours. Objective:   Vitals: BP (!) 153/77   Pulse 93   Temp 97.8 °F (36.6 °C) (Temporal)   Resp 16   Ht 6' (1.829 m)   Wt 238 lb 4.8 oz (108.1 kg) Comment: after removing blankets  SpO2 96%   BMI 32.32 kg/m²   24HR INTAKE/OUTPUT:      Intake/Output Summary (Last 24 hours) at 2/18/2021 1302  Last data filed at 2/18/2021 1120  Gross per 24 hour   Intake 120 ml   Output 2075 ml   Net -1955 ml     General appearance: alert and cooperative with exam  HEENT: AT/NC  Lungs: BLAE, diminished breath sounds b/l, coarse breath sounds  Heart: RRR  Abdomen: BS+, soft, NT, ND  Extremities: pulses+  Neurologic: Alert, gross motor function intact  Skin: warm    Assessment and Plan: Active Problems:    Sepsis ([address]  Resolved Problems:    * No resolved hospital problems.  *

## 2021-02-18 NOTE — PROGRESS NOTES
Speech Language Pathology  Facility/Department: [facility] PROGRESSIVE CARE  Initial Speech/Language/Cognitive Assessment  Swallow Therapy     NAME: Thuy Weller  : 1940   MRN: [id number]  ADMISSION DATE: 2021  ADMITTING DIAGNOSIS: has Diverticulitis of large intestine with perforation without bleeding; Generalized abdominal pain; Colonic diverticular abscess; Bilateral carotid artery stenosis; Atherosclerosis of native artery of both lower extremities with intermittent claudication ([address] Carotid stenosis, asymptomatic, left; Primary osteoarthritis of left knee; Arthritis of knee; Essential hypertension; Pure hypercholesterolemia; Slow transit constipation; Iron deficiency anemia; GERD (gastroesophageal reflux disease); Acute liver failure without hepatic coma; CHF (congestive heart failure) ([address] Bilateral pleural effusion; Palliative care patient; Shock liver; Acute renal failure (HCC); BPH associated with nocturia; Bleeding diathesis ([address] Epistaxis; Acute blood loss anemia; Melena; Hypocalcemia; Pneumonia due to infectious organism; Bladder cancer ([address] Postoperative pain; History of bladder cancer; Severe sepsis ([address] Acute on chronic respiratory failure ([address] UTI (urinary tract infection); Acute on chronic kidney failure ([address] Hypoxemia; Metabolic acidosis; Acidosis, metabolic, with respiratory acidosis; Acute respiratory failure ([address] Type 2 diabetes mellitus with complication, without long-term current use of insulin ([address] Weakness; Other dysphagia;  Low back pain; and Sepsis ([address] on their problem list.    Date of Eval/Treat: 2021   Evaluating Therapist: HALINA Sanchez    Assessment: Completed assessment. SLP ranked functional intelligibility of speech for unfamiliar listeners at 100% in utterances with background noise present. Patient exhibits slow processing, delayed and decreased auditory comprehension (particularly as length and complexity of information increases), frequent delayed verbalizations with moderate perseveration of previously spoken information observed, impaired immediate/short-term memory, and decreased reasoning/problem solving. It is noted that during evaluation, patient did not verbalize PCP or pharmacy at independent level. Patient did not verbalize conditions in which he takes medications nor verbalized names of medications he currently takes independently. Patient did not verbalize appropriate solutions to situations that could occur during activities of daily living at independent level. Also re-evaluated patient's swallowing function. Patient exhibits slow, decreased oral prep of more solid consistencies, fast oral transit and suspected swallow delay with thin liquids, and mild-moderately decreased and inconsistently sluggish laryngeal elevation for swallow airway protection. Even so, no outward S/S penetration/aspiration was observed with any puree consistency presentation, regular solid consistency trial, nectar thick liquid presentation, or thin H2O trial presented during treatment session this date. At this time, would recommend continuation current diet. Meds in pudding/applesauce. If patient receives mouth care prior to intake, okay for ice chips and small sips thin H2O IN BETWEEN MEALS for comfort. Will continue to follow. Subjective:  General  Chart Reviewed: Yes  Patient assessed for rehabilitation services?: Yes  Family / Caregiver Present: No     Objective:   Auditory Comprehension Comprehension:  (While delayed, patient demonstrated ability to answer simple yes/no questions regarding immediate environment and current state of being at independent level. While delayed, patient demonstrated ability to follow simple 1 and simple 2 step commands independently. Delays were noted and moderate break down was observed during yes/no questions of increased complexity and during complex 1 and simple 3 step commands.)     Expression  Primary Mode of Expression:  (Confrontation naming of items, structured responsive speech, and responses in natural conversation were all considered to be delayed with moderate perseveration of previously spoken information noted.)     Motor Speech:  (SLP ranked functional intelligibility of speech for unfamiliar listeners at 100% in utterances with background noise present.)     Overall Orientation Status:  (Patient demonstrated ability to verbalize name, birthday, city, state, and hospital at independent level. Patient did not verbalize any additional biographical, temporal, spatial, or situational information independently.)    Memory:  (Patient demonstrated decreased immediate memory with sequences of unrelated numbers/words set up to 5 items without repetitions provided. Patient demonstrated decreased short-term/working memory with less than 2 minute delay+distractions present.)     Problem Solving:  (It is noted that during assessment, patient did not verbalize PCP or pharmacy at independent level. Patient did not verbalize conditions in which he takes medication nor verbalized names of medications he currently takes independently.  Patient did not verbalize appropriate solutions to situations that could occur during activities of daily living at independent level.)     Additional Assessments: Re-assessed patient's swallowing function. Oral transit of puree consistency presentations, administered by SLP, primarily measured 1-2 seconds in length and no oral cavity residue was noted post swallows. With regular solid consistency trials presented by SLP, patient exhibited slow vertical jaw movement during oral prep. Oral transit of regular solid consistency varied from 1-3 seconds in length and min oral cavity residue was observed post swallows; residue cleared from the mouth with additional dry swallows. Oral transit of nectar thick liquid presentations, administered via cup by SLP, primarily measured 1-2 seconds in length. Oral transit of thin H2O trials, presented via cup by SLP, was considered to be fast and uncontrolled. Laryngeal elevation during swallow initiation was considered to be mild-moderately decreased and inconsistently sluggish for swallow airway protection. Even so, no outward S/S penetration/aspiration was noted with any puree consistency presentation, regular solid consistency trial, nectar thick liquid presentation, or thin H2O trial presented during treatment session this date. At this time, would recommend continuation current diet. Meds in pudding/applesauce. If patient receives mouth care prior to intake, okay for ice chips and small sips thin H2O IN BETWEEN MEALS for comfort. Will continue to follow.     Electronically signed by HALINA Canela on 2/18/2021 at 2:12 PM

## 2021-02-18 NOTE — PROGRESS NOTES
Visited with pt to provide spiritual care. Pt says he is a little better but not enough to go home. This  provided spiritual care with sustaining presence, nurtured hope, and prayer. Pt expressed gratitude for spiritual care.     Electronically signed by Funmi Frausto on 2/18/2021 at 3:44 PM

## 2021-02-18 NOTE — PROGRESS NOTES
Dr Margarita De La Cruz came to check on pt with this nurse. When asked how he was feeling, pt replied \"very well, thank you\" when asked where he was, he replied [place]". When asked who the president is he repeated the question 3 times and stuttered but eventually answered \"Jluis Becerra\". Pt followed commands for physician. Dr Margarita De La Cruz reviewed pt's chart and labs and wants to continue monitoring.  Electronically signed by Bharathi Phan RN on 2/18/2021 at 12:00 AM

## 2021-02-18 NOTE — PROGRESS NOTES
Physical Therapy  Toma Duarte  [id number]     02/18/21 0744   Subjective   Subjective Attempt, patient did not feel up to walking or OOB at this time. Will cont to follow.    Electronically signed by Benoit Ann PTA on 2/18/2021 at 7:45 AM

## 2021-02-18 NOTE — PROGRESS NOTES
During assessment, pt seems different than the past 2 nights. He is repetitive with answers and slow to answer, although answers are correct. He follows commands. Pt stated \"help me please\". When I asked what I could do he stated \"I'm weak\". BG checked and was 212. Vitals checked and were stable. O2 sat was 91% on 5L, increased O2 to 6L was then 93%. Spoke with Dr Elio Rodriguez regarding my concerns for change in behavior. He states he will come check on him soon.  Electronically signed by Roula Johns RN on 2/17/2021 at 9:45 PM

## 2021-02-18 NOTE — PROGRESS NOTES
Monitor room reported pt had a 6 beat run of VTACH to [facility]. She checked on him and he stated he was fine and didn't need anything.  Electronically signed by Vinay Briceño RN on 2/17/2021 at 9:48 PM

## 2021-02-19 LAB
ALBUMIN SERPL-MCNC: 2.9 G/DL (ref 3.5–5.2)
ALP BLD-CCNC: 72 U/L (ref 40–130)
ALT SERPL-CCNC: 37 U/L (ref 5–41)
ANION GAP SERPL CALCULATED.3IONS-SCNC: 10 MMOL/L (ref 7–19)
AST SERPL-CCNC: 18 U/L (ref 5–40)
BASOPHILS ABSOLUTE: 0 K/UL (ref 0–0.2)
BASOPHILS RELATIVE PERCENT: 0.4 % (ref 0–1)
BILIRUB SERPL-MCNC: 0.5 MG/DL (ref 0.2–1.2)
BLOOD CULTURE, ROUTINE: NORMAL
BUN BLDV-MCNC: 32 MG/DL (ref 8–23)
CALCIUM SERPL-MCNC: 8.4 MG/DL (ref 8.8–10.2)
CHLORIDE BLD-SCNC: 101 MMOL/L (ref 98–111)
CO2: 25 MMOL/L (ref 22–29)
CREAT SERPL-MCNC: 1.3 MG/DL (ref 0.5–1.2)
CULTURE, BLOOD 2: NORMAL
EOSINOPHILS ABSOLUTE: 0 K/UL (ref 0–0.6)
EOSINOPHILS RELATIVE PERCENT: 0 % (ref 0–5)
GFR AFRICAN AMERICAN: >59
GFR NON-AFRICAN AMERICAN: 53
GLUCOSE BLD-MCNC: 120 MG/DL (ref 70–99)
GLUCOSE BLD-MCNC: 127 MG/DL (ref 74–109)
GLUCOSE BLD-MCNC: 133 MG/DL (ref 70–99)
GLUCOSE BLD-MCNC: 162 MG/DL (ref 70–99)
GLUCOSE BLD-MCNC: 185 MG/DL (ref 70–99)
HCT VFR BLD CALC: 38.1 % (ref 42–52)
HEMOGLOBIN: 12.3 G/DL (ref 14–18)
IMMATURE GRANULOCYTES #: 0.5 K/UL
LYMPHOCYTES ABSOLUTE: 1.2 K/UL (ref 1.1–4.5)
LYMPHOCYTES RELATIVE PERCENT: 10.7 % (ref 20–40)
MAGNESIUM: 1.5 MG/DL (ref 1.6–2.4)
MCH RBC QN AUTO: 28 PG (ref 27–31)
MCHC RBC AUTO-ENTMCNC: 32.3 G/DL (ref 33–37)
MCV RBC AUTO: 86.8 FL (ref 80–94)
MONOCYTES ABSOLUTE: 1 K/UL (ref 0–0.9)
MONOCYTES RELATIVE PERCENT: 8.8 % (ref 0–10)
NEUTROPHILS ABSOLUTE: 8.3 K/UL (ref 1.5–7.5)
NEUTROPHILS RELATIVE PERCENT: 76 % (ref 50–65)
PDW BLD-RTO: 13.4 % (ref 11.5–14.5)
PERFORMED ON: ABNORMAL
PLATELET # BLD: 197 K/UL (ref 130–400)
PMV BLD AUTO: 11.5 FL (ref 9.4–12.4)
POTASSIUM SERPL-SCNC: 3.9 MMOL/L (ref 3.5–5)
RBC # BLD: 4.39 M/UL (ref 4.7–6.1)
SODIUM BLD-SCNC: 136 MMOL/L (ref 136–145)
TOTAL PROTEIN: 4.8 G/DL (ref 6.6–8.7)
VANCOMYCIN TROUGH: 19.6 UG/ML (ref 10–20)
WBC # BLD: 10.9 K/UL (ref 4.8–10.8)

## 2021-02-19 PROCEDURE — 2580000003 HC RX 258: Performed by: INTERNAL MEDICINE

## 2021-02-19 PROCEDURE — 6360000002 HC RX W HCPCS: Performed by: INTERNAL MEDICINE

## 2021-02-19 PROCEDURE — 82947 ASSAY GLUCOSE BLOOD QUANT: CPT

## 2021-02-19 PROCEDURE — 85025 COMPLETE CBC W/AUTO DIFF WBC: CPT

## 2021-02-19 PROCEDURE — 6370000000 HC RX 637 (ALT 250 FOR IP): Performed by: INTERNAL MEDICINE

## 2021-02-19 PROCEDURE — 97116 GAIT TRAINING THERAPY: CPT

## 2021-02-19 PROCEDURE — 92526 ORAL FUNCTION THERAPY: CPT

## 2021-02-19 PROCEDURE — 97129 THER IVNTJ 1ST 15 MIN: CPT

## 2021-02-19 PROCEDURE — 80202 ASSAY OF VANCOMYCIN: CPT

## 2021-02-19 PROCEDURE — 80053 COMPREHEN METABOLIC PANEL: CPT

## 2021-02-19 PROCEDURE — 2700000000 HC OXYGEN THERAPY PER DAY

## 2021-02-19 PROCEDURE — 2140000000 HC CCU INTERMEDIATE R&B

## 2021-02-19 PROCEDURE — 83735 ASSAY OF MAGNESIUM: CPT

## 2021-02-19 PROCEDURE — 36415 COLL VENOUS BLD VENIPUNCTURE: CPT

## 2021-02-19 PROCEDURE — 2500000003 HC RX 250 WO HCPCS: Performed by: INTERNAL MEDICINE

## 2021-02-19 PROCEDURE — 94640 AIRWAY INHALATION TREATMENT: CPT

## 2021-02-19 RX ORDER — MAGNESIUM SULFATE IN WATER 40 MG/ML
4000 INJECTION, SOLUTION INTRAVENOUS ONCE
Status: COMPLETED | OUTPATIENT
Start: 2021-02-19 | End: 2021-02-19

## 2021-02-19 RX ORDER — DOXYCYCLINE HYCLATE 100 MG/1
100 CAPSULE ORAL EVERY 12 HOURS SCHEDULED
Status: DISCONTINUED | OUTPATIENT
Start: 2021-02-19 | End: 2021-02-24 | Stop reason: HOSPADM

## 2021-02-19 RX ADMIN — HYDRALAZINE HYDROCHLORIDE 25 MG: 25 TABLET, FILM COATED ORAL at 15:12

## 2021-02-19 RX ADMIN — METOPROLOL SUCCINATE 100 MG: 50 TABLET, EXTENDED RELEASE ORAL at 09:51

## 2021-02-19 RX ADMIN — DOXYCYCLINE 100 MG: 100 INJECTION, POWDER, LYOPHILIZED, FOR SOLUTION INTRAVENOUS at 12:51

## 2021-02-19 RX ADMIN — STANDARDIZED SENNA CONCENTRATE 8.6 MG: 8.6 TABLET ORAL at 21:56

## 2021-02-19 RX ADMIN — ATORVASTATIN CALCIUM 20 MG: 20 TABLET, FILM COATED ORAL at 09:51

## 2021-02-19 RX ADMIN — INSULIN LISPRO 1 UNITS: 100 INJECTION, SOLUTION INTRAVENOUS; SUBCUTANEOUS at 17:49

## 2021-02-19 RX ADMIN — ENOXAPARIN SODIUM 40 MG: 40 INJECTION SUBCUTANEOUS at 21:56

## 2021-02-19 RX ADMIN — HYDRALAZINE HYDROCHLORIDE 25 MG: 25 TABLET, FILM COATED ORAL at 21:56

## 2021-02-19 RX ADMIN — VANCOMYCIN HYDROCHLORIDE 1000 MG: 10 INJECTION, POWDER, LYOPHILIZED, FOR SOLUTION INTRAVENOUS at 12:51

## 2021-02-19 RX ADMIN — IPRATROPIUM BROMIDE AND ALBUTEROL SULFATE 1 AMPULE: 2.5; .5 SOLUTION RESPIRATORY (INHALATION) at 18:28

## 2021-02-19 RX ADMIN — CEFEPIME HYDROCHLORIDE 2000 MG: 2 INJECTION, POWDER, FOR SOLUTION INTRAVENOUS at 06:18

## 2021-02-19 RX ADMIN — SODIUM CHLORIDE, PRESERVATIVE FREE 10 ML: 5 INJECTION INTRAVENOUS at 21:57

## 2021-02-19 RX ADMIN — IPRATROPIUM BROMIDE AND ALBUTEROL SULFATE 1 AMPULE: 2.5; .5 SOLUTION RESPIRATORY (INHALATION) at 14:25

## 2021-02-19 RX ADMIN — GUAIFENESIN 1200 MG: 600 TABLET, EXTENDED RELEASE ORAL at 21:56

## 2021-02-19 RX ADMIN — SODIUM CHLORIDE, PRESERVATIVE FREE 10 ML: 5 INJECTION INTRAVENOUS at 10:00

## 2021-02-19 RX ADMIN — DOXYCYCLINE 100 MG: 100 INJECTION, POWDER, LYOPHILIZED, FOR SOLUTION INTRAVENOUS at 02:17

## 2021-02-19 RX ADMIN — MAGNESIUM SULFATE HEPTAHYDRATE 4000 MG: 40 INJECTION, SOLUTION INTRAVENOUS at 09:52

## 2021-02-19 RX ADMIN — PANTOPRAZOLE SODIUM 40 MG: 40 TABLET, DELAYED RELEASE ORAL at 09:52

## 2021-02-19 RX ADMIN — CEFEPIME HYDROCHLORIDE 2000 MG: 2 INJECTION, POWDER, FOR SOLUTION INTRAVENOUS at 17:48

## 2021-02-19 RX ADMIN — DOXYCYCLINE HYCLATE 100 MG: 100 CAPSULE ORAL at 21:56

## 2021-02-19 RX ADMIN — IPRATROPIUM BROMIDE AND ALBUTEROL SULFATE 1 AMPULE: 2.5; .5 SOLUTION RESPIRATORY (INHALATION) at 02:29

## 2021-02-19 RX ADMIN — GUAIFENESIN 1200 MG: 600 TABLET, EXTENDED RELEASE ORAL at 09:52

## 2021-02-19 RX ADMIN — LACTULOSE 10 G: 20 SOLUTION ORAL at 21:57

## 2021-02-19 RX ADMIN — TROSPIUM CHLORIDE 20 MG: 20 TABLET, FILM COATED ORAL at 21:56

## 2021-02-19 RX ADMIN — ARFORMOTEROL TARTRATE 15 MCG: 15 SOLUTION RESPIRATORY (INHALATION) at 18:28

## 2021-02-19 RX ADMIN — IPRATROPIUM BROMIDE AND ALBUTEROL SULFATE 1 AMPULE: 2.5; .5 SOLUTION RESPIRATORY (INHALATION) at 10:30

## 2021-02-19 RX ADMIN — TAMSULOSIN HYDROCHLORIDE 0.4 MG: 0.4 CAPSULE ORAL at 09:51

## 2021-02-19 RX ADMIN — INSULIN LISPRO 1 UNITS: 100 INJECTION, SOLUTION INTRAVENOUS; SUBCUTANEOUS at 21:57

## 2021-02-19 RX ADMIN — ARFORMOTEROL TARTRATE 15 MCG: 15 SOLUTION RESPIRATORY (INHALATION) at 06:56

## 2021-02-19 RX ADMIN — IPRATROPIUM BROMIDE AND ALBUTEROL SULFATE 1 AMPULE: 2.5; .5 SOLUTION RESPIRATORY (INHALATION) at 06:55

## 2021-02-19 RX ADMIN — BUDESONIDE 500 MCG: 0.5 INHALANT RESPIRATORY (INHALATION) at 18:28

## 2021-02-19 RX ADMIN — BUDESONIDE 500 MCG: 0.5 INHALANT RESPIRATORY (INHALATION) at 06:56

## 2021-02-19 RX ADMIN — IPRATROPIUM BROMIDE AND ALBUTEROL SULFATE 1 AMPULE: 2.5; .5 SOLUTION RESPIRATORY (INHALATION) at 21:59

## 2021-02-19 RX ADMIN — DOCUSATE SODIUM 100 MG: 100 CAPSULE, LIQUID FILLED ORAL at 09:51

## 2021-02-19 RX ADMIN — NIFEDIPINE 60 MG: 60 TABLET, FILM COATED, EXTENDED RELEASE ORAL at 09:52

## 2021-02-19 RX ADMIN — ASPIRIN 81 MG: 81 TABLET, COATED ORAL at 09:51

## 2021-02-19 ASSESSMENT — PAIN SCALES - GENERAL: PAINLEVEL_OUTOF10: 0

## 2021-02-19 NOTE — PROGRESS NOTES
Speech Language Pathology  Facility/Department: [facility] PROGRESSIVE CARE  Speech/Language/Cognitive/Swallow Therapy    NAME: Yoana Sharpe  : 1940   MRN: [id number]  ADMISSION DATE: 2021  ADMITTING DIAGNOSIS: has Diverticulitis of large intestine with perforation without bleeding; Generalized abdominal pain; Colonic diverticular abscess; Bilateral carotid artery stenosis; Atherosclerosis of native artery of both lower extremities with intermittent claudication ([address] Carotid stenosis, asymptomatic, left; Primary osteoarthritis of left knee; Arthritis of knee; Essential hypertension; Pure hypercholesterolemia; Slow transit constipation; Iron deficiency anemia; GERD (gastroesophageal reflux disease); Acute liver failure without hepatic coma; CHF (congestive heart failure) ([address] Bilateral pleural effusion; Palliative care patient; Shock liver; Acute renal failure (HCC); BPH associated with nocturia; Bleeding diathesis ([address] Epistaxis; Acute blood loss anemia; Melena; Hypocalcemia; Pneumonia due to infectious organism; Bladder cancer ([address] Postoperative pain; History of bladder cancer; Severe sepsis ([address] Acute on chronic respiratory failure ([address] UTI (urinary tract infection); Acute on chronic kidney failure ([address] Hypoxemia; Metabolic acidosis; Acidosis, metabolic, with respiratory acidosis; Acute respiratory failure ([address] Type 2 diabetes mellitus with complication, without long-term current use of insulin ([address] Weakness; Other dysphagia;  Low back pain; and Sepsis ([address] on their problem list.    Date of Treat: 2021   Evaluating Therapist: HALINA Lundy    Assessment: Monitored patient's speech/language/cognitive functioning. SLP continued to rank functional intelligibility of speech for unfamiliar listeners at 100% in utterances with background noise present. Patient exhibited slow processing, delayed and decreased auditory comprehension (particularly as length and complexity of information increases), moderately delayed verbalizations with mild perseveration of previously spoken information and mild fragmentations observed, and impaired short-term memory.     Also re-evaluated patient's swallowing function. Patient exhibits slow oral prep of more solid consistencies, fast oral transit and suspected swallow delay with thin liquids, and sluggish, mild-moderately decreased laryngeal elevation for swallow airway protection. No outward S/S penetration/aspiration was noted with any regular solid consistency trial presented during treatment session this date. Patient did exhibit S/S penetration/aspiration with thin H2O trials with mild wet vocal quality and mild delayed throat clears observed post swallows.     At this time, would trial regular solid consistency. Continue nectar thick liquids. Meds in pudding/applesauce. If patient receives mouth care prior to intake, okay for ice chips and small sips thin H2O IN BETWEEN MEALS for comfort. Will continue to follow.     Subjective:  General  Chart Reviewed: Yes  Patient assessed for rehabilitation services?: Yes  Family / Caregiver Present: No     Objective:   Auditory Comprehension Re-assessed patient's swallowing function. With regular solid consistency trials presented by SLP, patient exhibited slow oral prep with increased rotary jaw movement noted. Oral transit of regular solid consistency varied from 1-3 seconds in length and min-moderate oral cavity residue was observed post swallows; residue cleared from the mouth with additional dry swallows. Oral transit of thin H2O trials, presented independently via straw, was considered to be fast and uncontrolled. Laryngeal elevation during swallow initiation was considered to be sluggish and mild-moderately decreased for swallow airway protection. No outward S/S penetration/aspiration was noted with any regular solid consistency trial presented during treatment session this date. Patient did exhibit S/S penetration/aspiration with thin H2O trials with mild wet vocal quality and mild delayed throat clears observed post swallows.     At this time, would trial regular solid consistency. Continue nectar thick liquids. Meds in pudding/applesauce. If patient receives mouth care prior to intake, okay for ice chips and small sips thin H2O IN BETWEEN MEALS for comfort. Will continue to follow.     Electronically signed by HALINA Chung on 2/19/2021 at 11:52 AM

## 2021-02-19 NOTE — PROGRESS NOTES
[facility]        Hospitalist Progress Note  2/19/2021 3:19 PM  Subjective:   Admit Date: 2/12/2021  PCP: Caitlyn Stubbs MD    Chief Complaint: Dyspnea    Subjective: Patient seen and examined at bedside. Sitting in bedside chair. Feels much better today. Breathing improving. Working with therapy. Cumulative Hospital History: 67-year-old obese male with a past medical history of COPD not on home oxygen, CKD stage III, diabetes, hypertension, BPH, h/o bladder CA recently admitted for bilateral pneumonia requiring intubation and mechanical intubation discharged to inpatient rehab (by me) with improvement and eventual discharge home. Patient presents back to the ED with worsening dyspnea found to have worsening bilateral pneumonia on CXR, poor PO intake and associated ANA on CKD and elevated troponin. Cardiology consulted for elevated troponin. Troponin trending down - needs improvement in pulmonary status prior to stress testing or intervention from cardiology. Noted to have 2 of 2 bottles staph bacteremia and infectious disease consulted. ROS: 14 point review of systems is negative except as specifically addressed above.     DIET CARDIAC; Carb Control: 4 carb choices (60 gms)/meal; Mildly Thick (Nectar)    Intake/Output Summary (Last 24 hours) at 2/19/2021 1519  Last data filed at 2/19/2021 1333  Gross per 24 hour   Intake 550 ml   Output 1300 ml   Net -750 ml     Medications:   sodium chloride 100 mL/hr at 02/17/21 0540    dextrose       Current Facility-Administered Medications   Medication Dose Route Frequency Provider Last Rate Last Admin    vancomycin (VANCOCIN) 1000 mg in dextrose 5% 250 mL IVPB  1,000 mg Intravenous Q24H Ricco Pérez MD   Stopped at 02/19/21 1352    hydrALAZINE (APRESOLINE) tablet 25 mg  25 mg Oral 3 times per day Ricco Pérez MD   25 mg at 02/19/21 1512  technetium sestamibi (CARDIOLITE) injection 10 millicurie  10 millicurie Intravenous ONCE PRN Carter Gardiner MD        technetium sestamibi (CARDIOLITE) injection 30 millicurie  30 millicurie Intravenous ONCE PRN Carter Gardiner MD        guaiFENesin (ROBITUSSIN) 100 MG/5ML syrup 200 mg  200 mg Oral Q4H PRN Terence Mcintosh MD        Benzocaine-Menthol (CEPACOL) 1 lozenge  1 lozenge Oral Q2H PRN Terence Mcintosh MD        phenol 1.4 % mouth spray 1 spray  1 spray Mouth/Throat Q2H PRN Terence Mcintosh MD        docusate sodium (COLACE) capsule 100 mg  100 mg Oral Daily Terence Mcintosh MD   100 mg at 02/19/21 0951    senna (SENOKOT) tablet 8.6 mg  1 tablet Oral Nightly Terence Mcintosh MD   8.6 mg at 02/18/21 2259    lactulose (CHRONULAC) 10 GM/15ML solution 10 g  10 g Oral TID PRN Terence Mcintosh MD   10 g at 02/15/21 0849    bisacodyl (DULCOLAX) suppository 10 mg  10 mg Rectal Daily PRN MD Nicolas PaulaaiFENesin [facility]) extended release tablet 1,200 mg  1,200 mg Oral BID Carlo Mijares MD   1,200 mg at 02/19/21 0999    budesonide (PULMICORT) nebulizer suspension 500 mcg  0.5 mg Nebulization BID Terence Mcintosh MD   500 mcg at 02/19/21 0656    Arformoterol Tartrate (BROVANA) nebulizer solution 15 mcg  15 mcg Nebulization BID Terence Mcintosh MD   15 mcg at 02/19/21 0656    0.9 % sodium chloride infusion   Intravenous Continuous Terence Mcintosh  mL/hr at 02/17/21 0540 New Bag at 02/17/21 0540    ceFEPIme (MAXIPIME) 2,000 mg in sterile water 20 mL IV syringe  2,000 mg Intravenous Q12H Ana Maria Ramírez MD   2,000 mg at 02/19/21 0618    vancomycin (VANCOCIN) intermittent dosing (placeholder)   Other RX Placeholder Ana Maria Ramírez MD        aspirin EC tablet 81 mg  81 mg Oral Daily Ana Maria Ramírez MD   81 mg at 02/19/21 0909    atorvastatin (LIPITOR) tablet 20 mg  20 mg Oral Daily Ana Maria Ramírez MD   20 mg at 02/19/21 0603  metoprolol succinate (TOPROL XL) extended release tablet 100 mg  100 mg Oral Daily Jocy Thomas MD   100 mg at 02/19/21 0951    NIFEdipine (PROCARDIA XL) extended release tablet 60 mg  60 mg Oral Daily Jocy Thomas MD   60 mg at 02/19/21 0780    pantoprazole (PROTONIX) tablet 40 mg  40 mg Oral Daily Jocy Thomas MD   40 mg at 02/19/21 0610    tamsulosin (FLOMAX) capsule 0.4 mg  0.4 mg Oral Daily Jocy Thomas MD   0.4 mg at 02/19/21 1145    trospium (SANCTURA) tablet 20 mg  20 mg Oral Nightly Jocy Thomas MD   20 mg at 02/18/21 2259    sodium chloride flush 0.9 % injection 10 mL  10 mL Intravenous 2 times per day Jocy Thomas MD   10 mL at 02/19/21 1000    sodium chloride flush 0.9 % injection 10 mL  10 mL Intravenous PRN Jocy Thomas MD   10 mL at 02/18/21 1647    enoxaparin (LOVENOX) injection 40 mg  40 mg Subcutaneous Q24H Jocy Thomas MD   40 mg at 02/18/21 2300    polyethylene glycol (GLYCOLAX) packet 17 g  17 g Oral Daily PRN Jocy Thomas MD   17 g at 02/14/21 2233    acetaminophen (TYLENOL) tablet 650 mg  650 mg Oral Q6H PRN Jocy Thomas MD        Or    acetaminophen (TYLENOL) suppository 650 mg  650 mg Rectal Q6H PRN Jocy Thomas MD        insulin lispro (HUMALOG) injection vial 0-6 Units  0-6 Units Subcutaneous TID WC Jocy Thomas MD   1 Units at 02/18/21 1812    insulin lispro (HUMALOG) injection vial 0-3 Units  0-3 Units Subcutaneous Nightly Jocy Thomas MD   1 Units at 02/17/21 2054    ipratropium-albuterol (DUONEB) nebulizer solution 1 ampule  1 ampule Inhalation Q4H Jocy Thomas MD   1 ampule at 02/19/21 1425    doxycycline (VIBRAMYCIN) 100 mg in dextrose 5 % 100 mL IVPB  100 mg Intravenous Q12H Jocy Thomas MD   Stopped at 02/19/21 1351    glucose (GLUTOSE) 40 % oral gel 15 g  15 g Oral PRN Jocy Thomas MD        dextrose 50 % IV solution  12.5 g Intravenous PRN Jocy Thomas MD  glucagon (rDNA) injection 1 mg  1 mg Intramuscular PRN Promise Jiang MD        dextrose 5 % solution  100 mL/hr Intravenous PRN Promise Jiang MD            Labs:     Recent Labs     02/17/21  7068 02/18/21 0444 02/19/21  0134   WBC 10.0 11.2* 10.9*   RBC 4.50* 4.80 4.39*   HGB 12.5* 13.3* 12.3*   HCT 39.1* 41.3* 38.1*   MCV 86.9 86.0 86.8   MCH 27.8 27.7 28.0   MCHC 32.0* 32.2* 32.3*    224 197     Recent Labs     02/17/21  0609 02/18/21  0444 02/19/21  0134    135* 136   K 4.2 4.1 3.9   ANIONGAP 12 9 10    99 101   CO2 23 27 25   BUN 37* 34* 32*   CREATININE 1.3* 1.3* 1.3*   GLUCOSE 181* 104 127*   CALCIUM 8.5* 8.7* 8.4*     Recent Labs     02/17/21  0609 02/18/21  0444 02/19/21  0134   MG 1.6 1.6 1.5*     Recent Labs     02/17/21  0609 02/18/21  0444 02/19/21  0134   AST 33 22 18   ALT 68* 52* 37   BILITOT 0.3 0.5 0.5   ALKPHOS 88 83 72     ABGs:No results for input(s): PH, PO2, PCO2, HCO3, BE, O2SAT in the last 72 hours. Troponin T:   No results for input(s): TROPONINI in the last 72 hours. INR: No results for input(s): INR in the last 72 hours. Lactic Acid:   No results for input(s): LACTA in the last 72 hours. Objective:   Vitals: /69   Pulse 86   Temp 97.2 °F (36.2 °C) (Temporal)   Resp 18   Ht 6' (1.829 m)   Wt 232 lb 14.4 oz (105.6 kg)   SpO2 96%   BMI 31.59 kg/m²   24HR INTAKE/OUTPUT:      Intake/Output Summary (Last 24 hours) at 2/19/2021 1519  Last data filed at 2/19/2021 1333  Gross per 24 hour   Intake 550 ml   Output 1300 ml   Net -750 ml     General appearance: alert and cooperative with exam  HEENT: AT/NC  Lungs: BLAE  Heart: RRR  Abdomen: BS+, soft, NT, ND  Extremities: pulses+  Neurologic: Alert, gross motor function intact  Skin: warm    Assessment and Plan: Active Problems:    Sepsis ([address]  Resolved Problems:    * No resolved hospital problems.  * Pneumonia/COPD Exacerbation: Broad spectrum antibiotics - Day 8 Vancomycin/Cefepime. Day 7 Doxycycline. Bronchodilators. Wean O2 as tolerated. Dizziness: Resolved. CT Head unremarkable. Elevated troponin: Cardiology has seen during admission. Further workup including likely stress test as outpatient. ANA/CKD III: Monitor BMP. DM: HbA1c 6.2. Monitor BG and adjust medications PRN. Supportive management. PT/OT/SLP.     Advance Directive: Full Code    DVT prophylaxis: Lovenox    Discharge planning: TBD      Signed:  Darlene Connelly MD 2/19/2021 3:19 PM  Rounding Hospitalist

## 2021-02-19 NOTE — PROGRESS NOTES
Comprehensive Nutrition Assessment    Type and Reason for Visit:  Initial, RD Nutrition Re-Screen/LOS    Nutrition Recommendations/Plan: continue to follow for SLP recommendations    Nutrition Assessment:  Pt is well nourished AEB fat and muscle mass. Modifying current diet to include Carb Control d/t increasing glucose levels, hx-DM    Malnutrition Assessment:  Malnutrition Status:  No malnutrition    Context:  Acute Illness     Findings of the 6 clinical characteristics of malnutrition:  Energy Intake:  Mild decrease in energy intake (Comment)  Weight Loss:  1 - 1% to 2% over 1 week     Body Fat Loss:  No significant body fat loss     Muscle Mass Loss:  No significant muscle mass loss    Fluid Accumulation:  1 - Mild Extremities   Strength:  Not Performed    Nutrition Related Findings:  well nourished      Wounds:  None       Current Nutrition Therapies:    DIET CARDIAC; Carb Control: 4 carb choices (60 gms)/meal; Mildly Thick (Nectar)    Anthropometric Measures:  · Height: 6' (182.9 cm)  · Current Body Weight: 232 lb 14.4 oz (105.6 kg)   · Admission Body Weight: 235 lb (106.6 kg)    · Usual Body Weight: 229 lb (103.9 kg)(11/2020)     · Ideal Body Weight: 178 lbs; % Ideal Body Weight 130.8 %   · BMI: 31.6  · Adjusted Body Weight:  ; No Adjustment   · BMI Categories: Obese Class 1 (BMI 30.0-34. 9)       Nutrition Diagnosis:   · Inadequate protein intake, Altered nutrition-related lab values related to biting/chewing (masticatory) difficulty, swallowing difficulty, endocrine dysfuntion as evidenced by intake 0-25%, intake 26-50%, intake 51-75%, swallow study results, lab values      Nutrition Interventions:   Food and/or Nutrient Delivery:  Continue Current Diet  Nutrition Education/Counseling:  No recommendation at this time   Coordination of Nutrition Care:  Continue to monitor while inpatient, Speech Therapy    Goals:  po intake 50% or greater for all meals.   [place]'s under 200 Nutrition Monitoring and Evaluation:   Behavioral-Environmental Outcomes:  None Identified   Food/Nutrient Intake Outcomes:  Food and Nutrient Intake  Physical Signs/Symptoms Outcomes:  Biochemical Data, Chewing or Swallowing, Hemodynamic Status, Skin, Weight, Nutrition Focused Physical Findings     Discharge Planning:     Too soon to determine     Electronically signed by Mae Farrell MS, RD, LD on 2/19/21 at 9:45 AM CST    Contact: [phone]

## 2021-02-19 NOTE — PROGRESS NOTES
INFECTIOUS DISEASES PROGRESS NOTE    Patient:  Noelle Connolly  YOB: 1940  MRN: [id number]   Admit date: 2/12/2021   Admitting Physician: Darlene Connelly MD  Primary Care Physician: Mai Murray MD    CHIEF COMPLAINT:   \"I am about to shave\"       Interval History:  Back up to 6 liters. Patient states he was not aware. Reviewing vital signs reveals he is been down to 4 somewhat and then 5 and back up to 6 L. He does not recall if he worked with therapy yesterday but he is hoping to walk with therapy today. He feels like he is not having difficulty with word finding anymore. Allergies: Allergies   Allergen Reactions    Eliquis [Apixaban] Other (See Comments)     \"almost bled to death\"    Promethazine Hcl Other (See Comments)     He became encephalopathic for several hours and was not responsive.        Current Meds:     magnesium sulfate 4000 mg in 100 mL IVPB premix, Once      vancomycin (VANCOCIN) 1,250 mg in dextrose 5 % 250 mL IVPB, Q24H      hydrALAZINE (APRESOLINE) tablet 25 mg, 3 times per day      technetium sestamibi (CARDIOLITE) injection 10 millicurie, ONCE PRN      technetium sestamibi (CARDIOLITE) injection 30 millicurie, ONCE PRN      guaiFENesin (ROBITUSSIN) 100 MG/5ML syrup 200 mg, Q4H PRN      Benzocaine-Menthol (CEPACOL) 1 lozenge, Q2H PRN      phenol 1.4 % mouth spray 1 spray, Q2H PRN      docusate sodium (COLACE) capsule 100 mg, Daily      senna (SENOKOT) tablet 8.6 mg, Nightly      lactulose (CHRONULAC) 10 GM/15ML solution 10 g, TID PRN      bisacodyl (DULCOLAX) suppository 10 mg, Daily PRN      guaiFENesin (MUCINEX) extended release tablet 1,200 mg, BID      budesonide (PULMICORT) nebulizer suspension 500 mcg, BID      Arformoterol Tartrate (BROVANA) nebulizer solution 15 mcg, BID      0.9 % sodium chloride infusion, Continuous      ceFEPIme (MAXIPIME) 2,000 mg in sterile water 20 mL IV syringe, Q12H   vancomycin (VANCOCIN) intermittent dosing (placeholder), RX Placeholder      aspirin EC tablet 81 mg, Daily      atorvastatin (LIPITOR) tablet 20 mg, Daily      metoprolol succinate (TOPROL XL) extended release tablet 100 mg, Daily      NIFEdipine (PROCARDIA XL) extended release tablet 60 mg, Daily      pantoprazole (PROTONIX) tablet 40 mg, Daily      tamsulosin (FLOMAX) capsule 0.4 mg, Daily      trospium (SANCTURA) tablet 20 mg, Nightly      sodium chloride flush 0.9 % injection 10 mL, 2 times per day      sodium chloride flush 0.9 % injection 10 mL, PRN      enoxaparin (LOVENOX) injection 40 mg, Q24H      polyethylene glycol (GLYCOLAX) packet 17 g, Daily PRN      acetaminophen (TYLENOL) tablet 650 mg, Q6H PRN    Or      acetaminophen (TYLENOL) suppository 650 mg, Q6H PRN      insulin lispro (HUMALOG) injection vial 0-6 Units, TID WC      insulin lispro (HUMALOG) injection vial 0-3 Units, Nightly      ipratropium-albuterol (DUONEB) nebulizer solution 1 ampule, Q4H      doxycycline (VIBRAMYCIN) 100 mg in dextrose 5 % 100 mL IVPB, Q12H      glucose (GLUTOSE) 40 % oral gel 15 g, PRN      dextrose 50 % IV solution, PRN      glucagon (rDNA) injection 1 mg, PRN      dextrose 5 % solution, PRN        Review of Systems   General: No fever  Respiratory: Cough remains overall improved  Vital Signs:  BP (!) 158/78   Pulse 83   Temp 97.3 °F (36.3 °C) (Temporal)   Resp 17   Ht 6' (1.829 m)   Wt 232 lb 14.4 oz (105.6 kg)   SpO2 94%   BMI 31.59 kg/m²   Temp (24hrs), Av.8 °F (36.6 °C), Min:97.3 °F (36.3 °C), Max:98.3 °F (36.8 °C)      Physical Exam  General: Patient is an elderly male lying in bed sitting up some and in no acute distress he actually was applying some deodorant. He had his shaving cream set up in front of him. HEENT: Sclera anicteric. O2 per nasal cannula in place. O2 is at 6 L/min. I located the patient's mask and handed it to him and he placed it over his nose and mouth with my assistance. Respiratory: Effort even and unlabored. He is not conversationally dyspneic. Neuro: Alert and oriented, speech clear. He is hard of hearing.   He did not appear to have any word finding difficulties today  Psych: Pleasant cooperative        LAB RESULTS:    CBC with DIFF:  Recent Labs     02/17/21  0609 02/18/21  0444 02/19/21  0134   WBC 10.0 11.2* 10.9*   RBC 4.50* 4.80 4.39*   HGB 12.5* 13.3* 12.3*   HCT 39.1* 41.3* 38.1*   MCV 86.9 86.0 86.8   MCH 27.8 27.7 28.0   MCHC 32.0* 32.2* 32.3*   RDW 13.3 13.3 13.4    224 197   MPV 11.3 11.3 11.5   NEUTOPHILPCT 75.3* 75.8* 76.0*   LYMPHOPCT 10.9* 10.9* 10.7*   MONOPCT 7.9 7.2 8.8   EOSRELPCT 0.0 0.0 0.0   BASOPCT 0.5 0.4 0.4   NEUTROABS 7.5 8.5* 8.3*   LYMPHSABS 1.1 1.2 1.2   MONOSABS 0.80 0.80 1.00*   EOSABS 0.00 0.00 0.00   BASOSABS 0.10 0.10 0.00       CMP/BMP:  Recent Labs     02/17/21  0609 02/18/21 0444 02/19/21  0134    135* 136   K 4.2 4.1 3.9    99 101   CO2 23 27 25   ANIONGAP 12 9 10   GLUCOSE 181* 104 127*   BUN 37* 34* 32*   CREATININE 1.3* 1.3* 1.3*   LABGLOM 53* 53* 53*   CALCIUM 8.5* 8.7* 8.4*   PROT 5.6* 5.6* 4.8*   LABALBU 3.0* 3.1* 2.9*   BILITOT 0.3 0.5 0.5   ALKPHOS 88 83 72   ALT 68* 52* 37   AST 33 22 18                  Respiratory Panel, Molecular, with COVID-19 (Restricted: peds pts or suitable admitted adults) [[id number]] Collected: 02/12/21 1615   Order Status: Completed Specimen: Nasopharyngeal Updated: 02/12/21 2840     Adenovirus by PCR Not Detected     Bordetella parapertussis by PCR Not Detected     Bordetella pertussis by PCR Not Detected     Chlamydophilia pneumoniae by PCR Not Detected     Coronavirus 229E by PCR Not Detected     Coronavirus HKU1 by PCR Not Detected     Coronavirus NL63 by PCR Not Detected   Coronavirus OC43 by PCR Not Detected     SARS-CoV-2, PCR Not Detected     Comment: This test has been authorized by FDA under an   Emergency Use Authorization (EUA). This test is only authorized for the duration of the   time of declaration that circumstances exist justifying the   authorization of the emergency use of in vitro diagnostic   testing for detection of the SARS-CoV-2 virus   and/or diagnosis of COVID-19 infection under   section 564 (b)(1) of the Act, 21 U. S.C. 916MWI-4 (b) (1),   unless the authorization is terminated or revoked sooner. Patient Fact SettlementContracts.gl   Provider Fact SettlementContracts.gl     METHODOLOGY: Multiplex PCR         Human Metapneumovirus by PCR Not Detected     Human Rhinovirus/Enterovirus by PCR Not Detected     Influenza A by PCR Not Detected     Influenza B by PCR Not Detected     Mycoplasma pneumoniae by PCR Not Detected     Parainfluenza Virus 1 by PCR Not Detected     Parainfluenza Virus 2 by PCR Not Detected     Parainfluenza Virus 3 by PCR Not Detected     Parainfluenza Virus 4 by PCR Not Detected     Respiratory Syncytial Virus by PCR                     IMAGING:                   Culture, Blood 2 [[id number]] Collected: 02/14/21 0706   Order Status: Completed Specimen: Blood Updated: 02/15/21 0714    Culture, Blood 2 No Growth to date.  Any change in status will be called. Narrative:     ORDER#: [id number]                          ORDERED BY: Gianfranco Scott   SOURCE: Blood Hand, Right                  COLLECTED:  02/14/21 07:06   ANTIBIOTICS AT [facility] :                      RECEIVED :  02/14/21 07:10   Culture, Blood 1 [[id number]] Collected: 02/14/21 0609   Order Status: Completed Specimen: Blood Updated: 02/15/21 0714    Blood Culture, Routine No Growth to date.  Any change in status will be called.    Narrative:     ORDER#: [id number]                          ORDERED BY: Gianfranco Scott SOURCE: Blood Antecubital-Lef              COLLECTED:  02/14/21 06:09   ANTIBIOTICS AT [facility] :                      RECEIVED :  02/14/21 06:27   Culture, Blood 1 [[id number]] (Abnormal) Collected: 02/12/21 1546   Order Status: Completed Specimen: Blood Updated: 02/15/21 5692    Organism Staphylococcus coagulase-negativeAbnormal     Blood Culture, Routine --    Isolated from Aerobic bottle   No further workup   Refer to Blood Culture drawn on 2/12/21 at 16:15 for complete results    Narrative:     ORDER#: [id number]                          ORDERED BY: CELSA Hoyt   SOURCE: Blood Antecubital-Rig              COLLECTED:  02/12/21 15:46   ANTIBIOTICS AT [facility] :                      RECEIVED :  02/12/21 15:55   CALL  [facility] tel. ,   Microbiology results called to and read back by Petra Beltran RN , 7th,   02/13/2021 12:56, by [facility]   Culture, Blood 2 [[id number]] (Abnormal)  Collected: 02/12/21 1615   Order Status: Completed Specimen: Blood Updated: 02/15/21 0633    Organism Staphylococcus epidermidisAbnormal     Culture, Blood 2 Isolated from Aerobic bottle   Narrative:     ORDER#: [id number]                          ORDERED BY: CELSA Hoyt   SOURCE: Blood Antecubital-Rig              COLLECTED:  02/12/21 16:15   ANTIBIOTICS AT [facility] :                      RECEIVED :  02/12/21 16:21   CALL  [facility] tel. ,   Microbiology results called to and read back by Petra Beltran RN 7th, 02/13/2021            RADIOLOGY      Ct Chest Wo Contrast    Result Date: 2/13/2021 EXAMINATION: CT CHEST WO CONTRAST 2/13/2021 2:17 PM HISTORY: Shortness of breath, bilateral pneumonia. DOSE: 976 mGycm. Automatic exposure control was utilized in an effort to use as little radiation as possible, without compromising image quality. REPORT: Spiral CT of the chest was performed without contrast, reconstructed coronal and sagittal images are also reviewed. COMPARISON: Portable chest x-ray to 12/20/2021. Review of lung windows demonstrates consolidative infiltrates in both lungs, on the right, this includes extensive infiltrate surrounding the right hilum, with contiguous infiltrate in the right upper lobe, with air bronchograms, there is also consolidation of the right lower lobe with air bronchograms and focal subpleural nodular infiltrates are identified in the right middle lobe and right upper lobe focally. In the left lung, there is a subpleural infiltrate in the left upper lobe along the major fissure, with mild paraseptal emphysematous changes. This is contiguous with mixed groundglass and consolidative infiltrates in the lingula and there is consolidation of the left lower lobe with air bronchograms, with associated bronchial wall thickening. A small amount of left perihilar infiltrate is also present. No pneumothorax is identified, there is a trace left pleural effusion, small right pleural effusion. No lung abscess or cavitation is seen in association with the areas of pneumonia. Soft tissue windows show a 12 mm low-attenuation lesion in the right thyroid lobe, which is most likely benign. There are normal-sized shotty appearing lymph nodes within the mediastinum without measurable lymphadenopathy. A small amount calcified plaques noted within the aortic arch and there is heavy calcified plaque within the coronary arteries. Heart size is normal. There is trace pericardial fluid. The visualized upper abdomen shows no acute abnormality. There is mild bilateral gynecomastia.  Review of bone windows shows advanced degenerative changes in the visualized upper lumbar spine. Consolidating infiltrates within both lungs, greatest in the right lower lobe and most compatible with bilateral pneumonia, there is a small right pleural effusion and trace left pleural effusion. No pneumothorax is identified. There is no lung abscess or areas of cavitation. Mild underlying changes of paraseptal emphysema are noted. No measurable intrathoracic lymphadenopathy is identified. Signed by Dr Joan Campos. Holly on 2/13/2021 2:28 PM    Us Renal Complete    Result Date: 2/13/2021  EXAMINATION: US RENAL COMPLETE 2/13/2021 3:16 PM HISTORY: Acute kidney injury. Report: Sonographic images of the kidneys were obtained. COMPARISON: CT of the abdomen and pelvis with without contrast 7/31/2020. Ultrasound of the kidneys 10/23/2018. The right kidney measures 11.6 x 5.5 x 6.0 cm and has normal cortical echogenicity, with a cortical thickness that averages 12 mm. No solid mass is identified. There are 2 cysts in the right kidney appear benign, including one at the interpolar level measures 1.3 x 1.4 x 1.0 cm (previous measurement of 0.9 x 0.8 x 0.9 cm) and one at the inferior pole that measures 2.5 x 2.9 x 2.1 cm. This cyst previously measured 2.7 x 2.2 x 2.0 cm and is essentially unchanged Color Doppler images demonstrate vascular flow within the right kidney. The left kidney measures 10.4 x 4.5 x 4.0 cm and has normal morphology and cortical echogenicity without evidence of hydronephrosis or solid mass. There is a benign-appearing cyst at the inferior pole that measures 1 cm, not clearly seen on the previous ultrasound. Color Doppler images demonstrate vascular flow within the left kidney. Additional small cysts are seen in the kidneys on prior CT but poorly visualized on today's examination. The bladder is within normal limits. Bilateral renal cysts as detailed above, with a benign appearance. Some smaller cysts seen in the kidneys on previous CT are not visualized on today's examination No hydronephrosis is identified. Both kidneys have normal cortical echogenicity. The bladder is unremarkable. Signed by Dr Angel Ramsey. Holly on 2/13/2021 3:21 PM    Xr Chest Portable    Result Date: 2/12/2021  EXAMINATION: XR CHEST PORTABLE 2/12/2021 4:13 PM HISTORY: Shortness of breath, hypoxia, recent pneumonia COMPARISON: 1/18/2021 FINDINGS: The heart appears normal in size. Atherosclerotic calcifications are seen in the aorta. Bilateral airspace opacities are present with relative sparing of the upper lobes, markedly increased compared to the prior exam. There is no appreciable pneumothorax or definite pleural effusion. Bilateral pneumonia, though worse when compared to the prior exam. Follow-up PA and lateral chest radiograph to recommended in 6-8 weeks to document resolution.  Signed by Dr Eloina Suresh on 2/12/2021 4:14 PM                  Patient Active Problem List   Diagnosis    Diverticulitis of large intestine with perforation without bleeding    Generalized abdominal pain    Colonic diverticular abscess    Bilateral carotid artery stenosis    Atherosclerosis of native artery of both lower extremities with intermittent claudication ([address]    Carotid stenosis, asymptomatic, left    Primary osteoarthritis of left knee    Arthritis of knee    Essential hypertension    Pure hypercholesterolemia    Slow transit constipation    Iron deficiency anemia    GERD (gastroesophageal reflux disease)    Acute liver failure without hepatic coma    CHF (congestive heart failure) (HCC)    Bilateral pleural effusion    Palliative care patient    Shock liver    Acute renal failure ([address]    BPH associated with nocturia    Bleeding diathesis ([address]    Epistaxis    Acute blood loss anemia    Melena    Hypocalcemia  Pneumonia due to infectious organism    Bladder cancer [facility]    Postoperative pain    History of bladder cancer    Severe sepsis (HCC)    Acute on chronic respiratory failure (HCC)    UTI (urinary tract infection)    Acute on chronic kidney failure (HCC)    Hypoxemia    Metabolic acidosis    Acidosis, metabolic, with respiratory acidosis    Acute respiratory failure (HCC)    Type 2 diabetes mellitus with complication, without long-term current use of insulin (HCC)    Weakness    Other dysphagia    Low back pain    Sepsis (HCC)       IMPRESSION:  Positive blood cultures - Staph epi in one bottle of one set and second set - drawn in ED with difficulty per patient - also one bottle of one set - referred to other ( ie not identified)- Suspect drawn from right antecubital iv site in ED - contaminant. Not consistent with pulmonary pathogen     Bilateral pneumonia right greater than left (patient had bilateral infiltrates right greater than left on chest x-ray during his January admission) last dose of methylprednisolone 2/17    Acute kidney injuryimproved and stableBaseline? RECOMMENDATIONS :  Continue Mucinex  Wean O2 as tolerated  contiunue broad abxvancomycin and cefepime  Change doxycycline to p.o. Suspect patient may need a longer course of IV, possibly 14 days, since he improved during last admission but worsened when transition to p.o. Continue trying to mobilize patient    We will see again Monday.   Call in the interim if needed    Rut Andersen MD

## 2021-02-19 NOTE — PROGRESS NOTES
02/19/21 1435   Restrictions/Precautions   Restrictions/Precautions Fall Risk   Required Braces or Orthoses? No   General   Chart Reviewed Yes   Additional Pertinent Hx recent hospital admit with PNA   Family / Caregiver Present No   Subjective   Subjective Patient in bed agrees to walk   General Comment   Comments Patient on 6L NC  Sp02   96% pre gait   Pain Screening   Patient Currently in Pain Denies   Intervention List Patient able to continue with treatment   Oxygen Therapy   SpO2 96 %  (pre gait)   O2 Device Nasal cannula   O2 Flow Rate (L/min) 6 L/min   Patient Observation   Observations Patient SPO2 dropped to 91% with gait  Kept patient on 6 L   Pre Treatment Pain Screening   Pain at present 0   Scale Used Numeric Score   Intervention List Patient able to continue with treatment   Orientation   Overall Orientation Status WFL   Bed Mobility   Supine to Sit Modified independent   Transfers   Sit to Stand Stand by assistance   Stand to sit Contact guard assistance   Comment Patient sat EOB for few minutes to rest zachary to gait   Ambulation   Ambulation?  Yes   Ambulation 1   Surface level tile   Device Rolling Walker   Other Apparatus O2  (6L)   Assistance Stand by assistance;Contact guard assistance  (1 + 1 for equipment)   Quality of Gait steady with RW   Gait Deviations Slow Nae;Decreased step length   Distance 250'   Comments Patient in chair post gait   Exercises   Comments Patient is steady enough to walk with NSG   Activity Tolerance   Activity Tolerance Patient Tolerated treatment well   Safety Devices   Type of devices Left in chair;Chair alarm in place;Call light within reach   Physical Therapy    Electronically signed by Regina Kingston PTA on 2/19/2021 at 2:45 PM

## 2021-02-20 PROBLEM — N39.0 UTI (URINARY TRACT INFECTION): Status: RESOLVED | Noted: 2021-01-18 | Resolved: 2021-02-20

## 2021-02-20 LAB
ALBUMIN SERPL-MCNC: 2.9 G/DL (ref 3.5–5.2)
ALP BLD-CCNC: 83 U/L (ref 40–130)
ALT SERPL-CCNC: 39 U/L (ref 5–41)
ANION GAP SERPL CALCULATED.3IONS-SCNC: 9 MMOL/L (ref 7–19)
AST SERPL-CCNC: 25 U/L (ref 5–40)
BASOPHILS ABSOLUTE: 0 K/UL (ref 0–0.2)
BASOPHILS RELATIVE PERCENT: 0.2 % (ref 0–1)
BILIRUB SERPL-MCNC: 0.4 MG/DL (ref 0.2–1.2)
BUN BLDV-MCNC: 30 MG/DL (ref 8–23)
CALCIUM SERPL-MCNC: 8.2 MG/DL (ref 8.8–10.2)
CHLORIDE BLD-SCNC: 101 MMOL/L (ref 98–111)
CO2: 26 MMOL/L (ref 22–29)
CREAT SERPL-MCNC: 1.3 MG/DL (ref 0.5–1.2)
EOSINOPHILS ABSOLUTE: 0 K/UL (ref 0–0.6)
EOSINOPHILS RELATIVE PERCENT: 0 % (ref 0–5)
GFR AFRICAN AMERICAN: >59
GFR NON-AFRICAN AMERICAN: 53
GLUCOSE BLD-MCNC: 101 MG/DL (ref 70–99)
GLUCOSE BLD-MCNC: 124 MG/DL (ref 70–99)
GLUCOSE BLD-MCNC: 166 MG/DL (ref 70–99)
GLUCOSE BLD-MCNC: 91 MG/DL (ref 74–109)
HCT VFR BLD CALC: 39.1 % (ref 42–52)
HEMOGLOBIN: 12.4 G/DL (ref 14–18)
IMMATURE GRANULOCYTES #: 0.3 K/UL
LYMPHOCYTES ABSOLUTE: 0.9 K/UL (ref 1.1–4.5)
LYMPHOCYTES RELATIVE PERCENT: 10 % (ref 20–40)
MAGNESIUM: 2.1 MG/DL (ref 1.6–2.4)
MCH RBC QN AUTO: 27.9 PG (ref 27–31)
MCHC RBC AUTO-ENTMCNC: 31.7 G/DL (ref 33–37)
MCV RBC AUTO: 88.1 FL (ref 80–94)
MONOCYTES ABSOLUTE: 0.9 K/UL (ref 0–0.9)
MONOCYTES RELATIVE PERCENT: 9.4 % (ref 0–10)
NEUTROPHILS ABSOLUTE: 6.9 K/UL (ref 1.5–7.5)
NEUTROPHILS RELATIVE PERCENT: 77 % (ref 50–65)
PDW BLD-RTO: 13.8 % (ref 11.5–14.5)
PERFORMED ON: ABNORMAL
PLATELET # BLD: 154 K/UL (ref 130–400)
PMV BLD AUTO: 11.6 FL (ref 9.4–12.4)
POTASSIUM SERPL-SCNC: 3.7 MMOL/L (ref 3.5–5)
RBC # BLD: 4.44 M/UL (ref 4.7–6.1)
SODIUM BLD-SCNC: 136 MMOL/L (ref 136–145)
TOTAL PROTEIN: 4.9 G/DL (ref 6.6–8.7)
WBC # BLD: 9 K/UL (ref 4.8–10.8)

## 2021-02-20 PROCEDURE — 6370000000 HC RX 637 (ALT 250 FOR IP): Performed by: INTERNAL MEDICINE

## 2021-02-20 PROCEDURE — 6360000002 HC RX W HCPCS: Performed by: INTERNAL MEDICINE

## 2021-02-20 PROCEDURE — 2140000000 HC CCU INTERMEDIATE R&B

## 2021-02-20 PROCEDURE — 83735 ASSAY OF MAGNESIUM: CPT

## 2021-02-20 PROCEDURE — 2700000000 HC OXYGEN THERAPY PER DAY

## 2021-02-20 PROCEDURE — 85025 COMPLETE CBC W/AUTO DIFF WBC: CPT

## 2021-02-20 PROCEDURE — 36415 COLL VENOUS BLD VENIPUNCTURE: CPT

## 2021-02-20 PROCEDURE — 94640 AIRWAY INHALATION TREATMENT: CPT

## 2021-02-20 PROCEDURE — 2580000003 HC RX 258: Performed by: INTERNAL MEDICINE

## 2021-02-20 PROCEDURE — 82947 ASSAY GLUCOSE BLOOD QUANT: CPT

## 2021-02-20 PROCEDURE — 80053 COMPREHEN METABOLIC PANEL: CPT

## 2021-02-20 RX ORDER — LACTULOSE 10 G/15ML
10 SOLUTION ORAL 3 TIMES DAILY
Status: DISCONTINUED | OUTPATIENT
Start: 2021-02-20 | End: 2021-02-24 | Stop reason: HOSPADM

## 2021-02-20 RX ADMIN — ARFORMOTEROL TARTRATE 15 MCG: 15 SOLUTION RESPIRATORY (INHALATION) at 07:05

## 2021-02-20 RX ADMIN — IPRATROPIUM BROMIDE AND ALBUTEROL SULFATE 1 AMPULE: 2.5; .5 SOLUTION RESPIRATORY (INHALATION) at 18:35

## 2021-02-20 RX ADMIN — LACTULOSE 10 G: 20 SOLUTION ORAL at 14:05

## 2021-02-20 RX ADMIN — BUDESONIDE 500 MCG: 0.5 INHALANT RESPIRATORY (INHALATION) at 18:35

## 2021-02-20 RX ADMIN — METOPROLOL SUCCINATE 100 MG: 50 TABLET, EXTENDED RELEASE ORAL at 09:11

## 2021-02-20 RX ADMIN — PANTOPRAZOLE SODIUM 40 MG: 40 TABLET, DELAYED RELEASE ORAL at 09:11

## 2021-02-20 RX ADMIN — HYDRALAZINE HYDROCHLORIDE 25 MG: 25 TABLET, FILM COATED ORAL at 14:05

## 2021-02-20 RX ADMIN — IPRATROPIUM BROMIDE AND ALBUTEROL SULFATE 1 AMPULE: 2.5; .5 SOLUTION RESPIRATORY (INHALATION) at 22:01

## 2021-02-20 RX ADMIN — ASPIRIN 81 MG: 81 TABLET, COATED ORAL at 09:11

## 2021-02-20 RX ADMIN — SODIUM CHLORIDE, PRESERVATIVE FREE 10 ML: 5 INJECTION INTRAVENOUS at 22:03

## 2021-02-20 RX ADMIN — CEFEPIME HYDROCHLORIDE 2000 MG: 2 INJECTION, POWDER, FOR SOLUTION INTRAVENOUS at 04:43

## 2021-02-20 RX ADMIN — STANDARDIZED SENNA CONCENTRATE 8.6 MG: 8.6 TABLET ORAL at 22:03

## 2021-02-20 RX ADMIN — SODIUM CHLORIDE, PRESERVATIVE FREE 10 ML: 5 INJECTION INTRAVENOUS at 09:11

## 2021-02-20 RX ADMIN — DOCUSATE SODIUM 100 MG: 100 CAPSULE, LIQUID FILLED ORAL at 09:11

## 2021-02-20 RX ADMIN — HYDRALAZINE HYDROCHLORIDE 25 MG: 25 TABLET, FILM COATED ORAL at 09:10

## 2021-02-20 RX ADMIN — IPRATROPIUM BROMIDE AND ALBUTEROL SULFATE 1 AMPULE: 2.5; .5 SOLUTION RESPIRATORY (INHALATION) at 10:26

## 2021-02-20 RX ADMIN — BUDESONIDE 500 MCG: 0.5 INHALANT RESPIRATORY (INHALATION) at 07:05

## 2021-02-20 RX ADMIN — IPRATROPIUM BROMIDE AND ALBUTEROL SULFATE 1 AMPULE: 2.5; .5 SOLUTION RESPIRATORY (INHALATION) at 06:57

## 2021-02-20 RX ADMIN — LACTULOSE 10 G: 20 SOLUTION ORAL at 22:02

## 2021-02-20 RX ADMIN — GUAIFENESIN 1200 MG: 600 TABLET, EXTENDED RELEASE ORAL at 22:02

## 2021-02-20 RX ADMIN — TAMSULOSIN HYDROCHLORIDE 0.4 MG: 0.4 CAPSULE ORAL at 09:10

## 2021-02-20 RX ADMIN — TROSPIUM CHLORIDE 20 MG: 20 TABLET, FILM COATED ORAL at 22:03

## 2021-02-20 RX ADMIN — NIFEDIPINE 60 MG: 60 TABLET, FILM COATED, EXTENDED RELEASE ORAL at 09:11

## 2021-02-20 RX ADMIN — VANCOMYCIN HYDROCHLORIDE 1000 MG: 10 INJECTION, POWDER, LYOPHILIZED, FOR SOLUTION INTRAVENOUS at 11:29

## 2021-02-20 RX ADMIN — IPRATROPIUM BROMIDE AND ALBUTEROL SULFATE 1 AMPULE: 2.5; .5 SOLUTION RESPIRATORY (INHALATION) at 01:48

## 2021-02-20 RX ADMIN — DOXYCYCLINE HYCLATE 100 MG: 100 CAPSULE ORAL at 09:11

## 2021-02-20 RX ADMIN — ARFORMOTEROL TARTRATE 15 MCG: 15 SOLUTION RESPIRATORY (INHALATION) at 18:36

## 2021-02-20 RX ADMIN — IPRATROPIUM BROMIDE AND ALBUTEROL SULFATE 1 AMPULE: 2.5; .5 SOLUTION RESPIRATORY (INHALATION) at 14:23

## 2021-02-20 RX ADMIN — DOXYCYCLINE HYCLATE 100 MG: 100 CAPSULE ORAL at 22:03

## 2021-02-20 RX ADMIN — ENOXAPARIN SODIUM 40 MG: 40 INJECTION SUBCUTANEOUS at 22:02

## 2021-02-20 RX ADMIN — CEFEPIME HYDROCHLORIDE 2000 MG: 2 INJECTION, POWDER, FOR SOLUTION INTRAVENOUS at 17:12

## 2021-02-20 RX ADMIN — ATORVASTATIN CALCIUM 20 MG: 20 TABLET, FILM COATED ORAL at 09:11

## 2021-02-20 RX ADMIN — INSULIN LISPRO 1 UNITS: 100 INJECTION, SOLUTION INTRAVENOUS; SUBCUTANEOUS at 17:12

## 2021-02-20 RX ADMIN — GUAIFENESIN 1200 MG: 600 TABLET, EXTENDED RELEASE ORAL at 09:11

## 2021-02-20 NOTE — PROGRESS NOTES
[facility]        Hospitalist Progress Note  2/20/2021 12:13 PM  Subjective:   Admit Date: 2/12/2021  PCP: Gunjan Adames MD    Chief Complaint: Dyspnea    Subjective: Patient seen and examined at bedside. Sitting in bedside chair. Continues to improve. Complaining of constipation. Cumulative Hospital History: 71-year-old obese male with a past medical history of COPD not on home oxygen, CKD stage III, diabetes, hypertension, BPH, h/o bladder CA recently admitted for bilateral pneumonia requiring intubation and mechanical intubation discharged to inpatient rehab (by me) with improvement and eventual discharge home. Patient presents back to the ED with worsening dyspnea found to have worsening bilateral pneumonia on CXR, poor PO intake and associated ANA on CKD and elevated troponin. Cardiology consulted for elevated troponin. Troponin trending down - needs improvement in pulmonary status prior to stress testing or intervention from cardiology - plans for stress test as an outpatient. Noted to have 2 of 2 bottles staph bacteremia and infectious disease consulted. Patient continues on broad spectrum antibiotics with likely need for 14 days of IV antibiotics. ROS: 14 point review of systems is negative except as specifically addressed above.     DIET CARDIAC; Carb Control: 4 carb choices (60 gms)/meal; Mildly Thick (Nectar)    Intake/Output Summary (Last 24 hours) at 2/20/2021 1213  Last data filed at 2/20/2021 0910  Gross per 24 hour   Intake 740 ml   Output 1200 ml   Net -460 ml     Medications:   sodium chloride 100 mL/hr at 02/17/21 0540    dextrose       Current Facility-Administered Medications   Medication Dose Route Frequency Provider Last Rate Last Admin    lactulose (CHRONULAC) 10 GM/15ML solution 10 g  10 g Oral TID Katherine Amos MD  vancomycin (VANCOCIN) 1000 mg in dextrose 5% 250 mL IVPB  1,000 mg Intravenous Q24H Rosa Whitten  mL/hr at 02/20/21 1129 1,000 mg at 02/20/21 1129    doxycycline hyclate (VIBRAMYCIN) capsule 100 mg  100 mg Oral 2 times per day Nayana Fernandez MD   100 mg at 02/20/21 0680    hydrALAZINE (APRESOLINE) tablet 25 mg  25 mg Oral 3 times per day Rosa Whitten MD   25 mg at 02/20/21 0910    technetium sestamibi (CARDIOLITE) injection 10 millicurie  10 millicurie Intravenous ONCE PRN Cl Guerra MD        technetium sestamibi (CARDIOLITE) injection 30 millicurie  30 millicurie Intravenous ONCE PRN Cl Guerra MD        guaiFENesin (ROBITUSSIN) 100 MG/5ML syrup 200 mg  200 mg Oral Q4H PRN Rosa Whitten MD        Benzocaine-Menthol (CEPACOL) 1 lozenge  1 lozenge Oral Q2H PRN Rosa Whitten MD        phenol 1.4 % mouth spray 1 spray  1 spray Mouth/Throat Q2H PRN Rosa Whitten MD        docusate sodium (COLACE) capsule 100 mg  100 mg Oral Daily Rosa Whitten MD   100 mg at 02/20/21 0911    senna (SENOKOT) tablet 8.6 mg  1 tablet Oral Nightly Rosa Whitten MD   8.6 mg at 02/19/21 2156    bisacodyl (DULCOLAX) suppository 10 mg  10 mg Rectal Daily PRN MD Nicolas TongaiFENesin [facility]) extended release tablet 1,200 mg  1,200 mg Oral BID Nayana Fernandez MD   1,200 mg at 02/20/21 0911    budesonide (PULMICORT) nebulizer suspension 500 mcg  0.5 mg Nebulization BID Rosa Whitten MD   500 mcg at 02/20/21 5224    Arformoterol Tartrate (BROVANA) nebulizer solution 15 mcg  15 mcg Nebulization BID Rosa Whitten MD   15 mcg at 02/20/21 0705    0.9 % sodium chloride infusion   Intravenous Continuous Rosa Whitten  mL/hr at 02/17/21 0540 New Bag at 02/17/21 0540    ceFEPIme (MAXIPIME) 2,000 mg in sterile water 20 mL IV syringe  2,000 mg Intravenous Q12H Promise Jiang MD   2,000 mg at 02/20/21 0984  vancomycin (VANCOCIN) intermittent dosing (placeholder)   Other RX Placeholder Pascale Peng MD        aspirin EC tablet 81 mg  81 mg Oral Daily Pascale Peng MD   81 mg at 02/20/21 0911    atorvastatin (LIPITOR) tablet 20 mg  20 mg Oral Daily Pascale Peng MD   20 mg at 02/20/21 9944    metoprolol succinate (TOPROL XL) extended release tablet 100 mg  100 mg Oral Daily Pascale Peng MD   100 mg at 02/20/21 0911    NIFEdipine (PROCARDIA XL) extended release tablet 60 mg  60 mg Oral Daily Pascale Peng MD   60 mg at 02/20/21 0911    pantoprazole (PROTONIX) tablet 40 mg  40 mg Oral Daily Pascale Peng MD   40 mg at 02/20/21 0911    tamsulosin (FLOMAX) capsule 0.4 mg  0.4 mg Oral Daily Pascale Peng MD   0.4 mg at 02/20/21 1704    trospium (SANCTURA) tablet 20 mg  20 mg Oral Nightly Pascale Peng MD   20 mg at 02/19/21 2156    sodium chloride flush 0.9 % injection 10 mL  10 mL Intravenous 2 times per day Pascale Peng MD   10 mL at 02/20/21 0911    sodium chloride flush 0.9 % injection 10 mL  10 mL Intravenous PRN Pascale Peng MD   10 mL at 02/18/21 1647    enoxaparin (LOVENOX) injection 40 mg  40 mg Subcutaneous Q24H Pascale Peng MD   40 mg at 02/19/21 2156    polyethylene glycol (GLYCOLAX) packet 17 g  17 g Oral Daily PRN Pascale Peng MD   17 g at 02/14/21 2233    acetaminophen (TYLENOL) tablet 650 mg  650 mg Oral Q6H PRN Pascale Peng MD        Or    acetaminophen (TYLENOL) suppository 650 mg  650 mg Rectal Q6H PRN Pascale Peng MD        insulin lispro (HUMALOG) injection vial 0-6 Units  0-6 Units Subcutaneous TID WC Pascale Peng MD   1 Units at 02/19/21 1749    insulin lispro (HUMALOG) injection vial 0-3 Units  0-3 Units Subcutaneous Nightly Pascale Peng MD   1 Units at 02/19/21 2157    ipratropium-albuterol (DUONEB) nebulizer solution 1 ampule  1 ampule Inhalation Q4H Pascale Peng MD   1 ampule at 02/20/21 1020  glucose (GLUTOSE) 40 % oral gel 15 g  15 g Oral PRN Shelby Stanley MD        dextrose 50 % IV solution  12.5 g Intravenous PRN Shelby Stanley MD        glucagon (rDNA) injection 1 mg  1 mg Intramuscular PRN Shelby Stanley MD        dextrose 5 % solution  100 mL/hr Intravenous PRN Shelby Stanley MD            Labs:     Recent Labs     02/18/21 0444 02/19/21 0134 02/20/21  0253   WBC 11.2* 10.9* 9.0   RBC 4.80 4.39* 4.44*   HGB 13.3* 12.3* 12.4*   HCT 41.3* 38.1* 39.1*   MCV 86.0 86.8 88.1   MCH 27.7 28.0 27.9   MCHC 32.2* 32.3* 31.7*    197 154     Recent Labs     02/18/21 0444 02/19/21 0134 02/20/21  0253   * 136 136   K 4.1 3.9 3.7   ANIONGAP 9 10 9   CL 99 101 101   CO2 27 25 26   BUN 34* 32* 30*   CREATININE 1.3* 1.3* 1.3*   GLUCOSE 104 127* 91   CALCIUM 8.7* 8.4* 8.2*     Recent Labs     02/18/21 0444 02/19/21 0134 02/20/21  0253   MG 1.6 1.5* 2.1     Recent Labs     02/18/21 0444 02/19/21 0134 02/20/21  0253   AST 22 18 25   ALT 52* 37 39   BILITOT 0.5 0.5 0.4   ALKPHOS 83 72 83     ABGs:No results for input(s): PH, PO2, PCO2, HCO3, BE, O2SAT in the last 72 hours. Troponin T:   No results for input(s): TROPONINI in the last 72 hours. INR: No results for input(s): INR in the last 72 hours. Lactic Acid:   No results for input(s): LACTA in the last 72 hours. Objective:   Vitals: /69   Pulse 68   Temp 97.7 °F (36.5 °C) (Temporal)   Resp 18   Ht 6' (1.829 m)   Wt 236 lb 5 oz (107.2 kg)   SpO2 97%   BMI 32.05 kg/m²   24HR INTAKE/OUTPUT:      Intake/Output Summary (Last 24 hours) at 2/20/2021 1213  Last data filed at 2/20/2021 0910  Gross per 24 hour   Intake 740 ml   Output 1200 ml   Net -460 ml     General appearance: alert and cooperative with exam  HEENT: AT/NC  Lungs: BLAE  Heart: RRR  Abdomen: BS+, soft, NT, ND  Extremities: pulses+  Neurologic: Alert, gross motor function intact  Skin: warm    Assessment and Plan:    Active Problems:    Sepsis ([address] Resolved Problems:    * No resolved hospital problems. *    Pneumonia/COPD Exacerbation: Broad spectrum antibiotics - Day 9 Vancomycin/Cefepime. Day 8 Doxycycline. Bronchodilators. Wean O2 as tolerated. Dizziness: Resolved. CT Head unremarkable. Elevated troponin: Cardiology has seen during admission. Further workup including likely stress test as outpatient. ANA/CKD III: Monitor BMP. DM: HbA1c 6.2. Monitor BG and adjust medications PRN. Supportive management. PT/OT/SLP.     Advance Directive: Full Code    DVT prophylaxis: Lovenox    Discharge planning: TBD      Signed:  Terence Mcintosh MD 2/20/2021 12:13 PM  Rounding Hospitalist

## 2021-02-21 LAB
ALBUMIN SERPL-MCNC: 2.9 G/DL (ref 3.5–5.2)
ALP BLD-CCNC: 95 U/L (ref 40–130)
ALT SERPL-CCNC: 37 U/L (ref 5–41)
ANION GAP SERPL CALCULATED.3IONS-SCNC: 11 MMOL/L (ref 7–19)
AST SERPL-CCNC: 22 U/L (ref 5–40)
BASOPHILS ABSOLUTE: 0 K/UL (ref 0–0.2)
BASOPHILS RELATIVE PERCENT: 0.1 % (ref 0–1)
BILIRUB SERPL-MCNC: 0.4 MG/DL (ref 0.2–1.2)
BUN BLDV-MCNC: 32 MG/DL (ref 8–23)
CALCIUM SERPL-MCNC: 8.1 MG/DL (ref 8.8–10.2)
CHLORIDE BLD-SCNC: 103 MMOL/L (ref 98–111)
CO2: 24 MMOL/L (ref 22–29)
CREAT SERPL-MCNC: 1.4 MG/DL (ref 0.5–1.2)
EOSINOPHILS ABSOLUTE: 0 K/UL (ref 0–0.6)
EOSINOPHILS RELATIVE PERCENT: 0 % (ref 0–5)
GFR AFRICAN AMERICAN: >59
GFR NON-AFRICAN AMERICAN: 49
GLUCOSE BLD-MCNC: 107 MG/DL (ref 70–99)
GLUCOSE BLD-MCNC: 127 MG/DL (ref 74–109)
GLUCOSE BLD-MCNC: 130 MG/DL (ref 70–99)
GLUCOSE BLD-MCNC: 156 MG/DL (ref 70–99)
GLUCOSE BLD-MCNC: 163 MG/DL (ref 70–99)
HCT VFR BLD CALC: 37.4 % (ref 42–52)
HEMOGLOBIN: 11.8 G/DL (ref 14–18)
IMMATURE GRANULOCYTES #: 0.2 K/UL
LYMPHOCYTES ABSOLUTE: 0.9 K/UL (ref 1.1–4.5)
LYMPHOCYTES RELATIVE PERCENT: 8 % (ref 20–40)
MAGNESIUM: 1.9 MG/DL (ref 1.6–2.4)
MCH RBC QN AUTO: 28.1 PG (ref 27–31)
MCHC RBC AUTO-ENTMCNC: 31.6 G/DL (ref 33–37)
MCV RBC AUTO: 89 FL (ref 80–94)
MONOCYTES ABSOLUTE: 0.9 K/UL (ref 0–0.9)
MONOCYTES RELATIVE PERCENT: 8.5 % (ref 0–10)
NEUTROPHILS ABSOLUTE: 8.7 K/UL (ref 1.5–7.5)
NEUTROPHILS RELATIVE PERCENT: 81.9 % (ref 50–65)
PDW BLD-RTO: 13.8 % (ref 11.5–14.5)
PERFORMED ON: ABNORMAL
PLATELET # BLD: 160 K/UL (ref 130–400)
PMV BLD AUTO: 12 FL (ref 9.4–12.4)
POTASSIUM SERPL-SCNC: 3.8 MMOL/L (ref 3.5–5)
RBC # BLD: 4.2 M/UL (ref 4.7–6.1)
SODIUM BLD-SCNC: 138 MMOL/L (ref 136–145)
TOTAL PROTEIN: 4.9 G/DL (ref 6.6–8.7)
VANCOMYCIN TROUGH: 18.1 UG/ML (ref 10–20)
WBC # BLD: 10.6 K/UL (ref 4.8–10.8)

## 2021-02-21 PROCEDURE — 6370000000 HC RX 637 (ALT 250 FOR IP): Performed by: INTERNAL MEDICINE

## 2021-02-21 PROCEDURE — 82947 ASSAY GLUCOSE BLOOD QUANT: CPT

## 2021-02-21 PROCEDURE — 80053 COMPREHEN METABOLIC PANEL: CPT

## 2021-02-21 PROCEDURE — 36415 COLL VENOUS BLD VENIPUNCTURE: CPT

## 2021-02-21 PROCEDURE — 2140000000 HC CCU INTERMEDIATE R&B

## 2021-02-21 PROCEDURE — 6360000002 HC RX W HCPCS: Performed by: INTERNAL MEDICINE

## 2021-02-21 PROCEDURE — 80202 ASSAY OF VANCOMYCIN: CPT

## 2021-02-21 PROCEDURE — 85025 COMPLETE CBC W/AUTO DIFF WBC: CPT

## 2021-02-21 PROCEDURE — 97116 GAIT TRAINING THERAPY: CPT

## 2021-02-21 PROCEDURE — 2700000000 HC OXYGEN THERAPY PER DAY

## 2021-02-21 PROCEDURE — 2580000003 HC RX 258: Performed by: INTERNAL MEDICINE

## 2021-02-21 PROCEDURE — 94640 AIRWAY INHALATION TREATMENT: CPT

## 2021-02-21 PROCEDURE — 83735 ASSAY OF MAGNESIUM: CPT

## 2021-02-21 RX ADMIN — HYDRALAZINE HYDROCHLORIDE 25 MG: 25 TABLET, FILM COATED ORAL at 06:34

## 2021-02-21 RX ADMIN — TROSPIUM CHLORIDE 20 MG: 20 TABLET, FILM COATED ORAL at 23:07

## 2021-02-21 RX ADMIN — BUDESONIDE 500 MCG: 0.5 INHALANT RESPIRATORY (INHALATION) at 06:46

## 2021-02-21 RX ADMIN — ARFORMOTEROL TARTRATE 15 MCG: 15 SOLUTION RESPIRATORY (INHALATION) at 19:09

## 2021-02-21 RX ADMIN — STANDARDIZED SENNA CONCENTRATE 8.6 MG: 8.6 TABLET ORAL at 23:07

## 2021-02-21 RX ADMIN — HYDRALAZINE HYDROCHLORIDE 25 MG: 25 TABLET, FILM COATED ORAL at 13:39

## 2021-02-21 RX ADMIN — CEFEPIME HYDROCHLORIDE 2000 MG: 2 INJECTION, POWDER, FOR SOLUTION INTRAVENOUS at 06:34

## 2021-02-21 RX ADMIN — DOXYCYCLINE HYCLATE 100 MG: 100 CAPSULE ORAL at 23:07

## 2021-02-21 RX ADMIN — DOCUSATE SODIUM 100 MG: 100 CAPSULE, LIQUID FILLED ORAL at 08:08

## 2021-02-21 RX ADMIN — IPRATROPIUM BROMIDE AND ALBUTEROL SULFATE 1 AMPULE: 2.5; .5 SOLUTION RESPIRATORY (INHALATION) at 14:24

## 2021-02-21 RX ADMIN — TAMSULOSIN HYDROCHLORIDE 0.4 MG: 0.4 CAPSULE ORAL at 08:08

## 2021-02-21 RX ADMIN — INSULIN LISPRO 1 UNITS: 100 INJECTION, SOLUTION INTRAVENOUS; SUBCUTANEOUS at 11:56

## 2021-02-21 RX ADMIN — IPRATROPIUM BROMIDE AND ALBUTEROL SULFATE 1 AMPULE: 2.5; .5 SOLUTION RESPIRATORY (INHALATION) at 02:00

## 2021-02-21 RX ADMIN — LACTULOSE 10 G: 20 SOLUTION ORAL at 13:39

## 2021-02-21 RX ADMIN — PANTOPRAZOLE SODIUM 40 MG: 40 TABLET, DELAYED RELEASE ORAL at 08:08

## 2021-02-21 RX ADMIN — ARFORMOTEROL TARTRATE 15 MCG: 15 SOLUTION RESPIRATORY (INHALATION) at 06:46

## 2021-02-21 RX ADMIN — IPRATROPIUM BROMIDE AND ALBUTEROL SULFATE 1 AMPULE: 2.5; .5 SOLUTION RESPIRATORY (INHALATION) at 06:35

## 2021-02-21 RX ADMIN — ENOXAPARIN SODIUM 40 MG: 40 INJECTION SUBCUTANEOUS at 23:09

## 2021-02-21 RX ADMIN — DOXYCYCLINE HYCLATE 100 MG: 100 CAPSULE ORAL at 08:08

## 2021-02-21 RX ADMIN — METOPROLOL SUCCINATE 100 MG: 50 TABLET, EXTENDED RELEASE ORAL at 08:08

## 2021-02-21 RX ADMIN — BUDESONIDE 500 MCG: 0.5 INHALANT RESPIRATORY (INHALATION) at 19:09

## 2021-02-21 RX ADMIN — SODIUM CHLORIDE, PRESERVATIVE FREE 10 ML: 5 INJECTION INTRAVENOUS at 23:10

## 2021-02-21 RX ADMIN — HYDRALAZINE HYDROCHLORIDE 25 MG: 25 TABLET, FILM COATED ORAL at 23:08

## 2021-02-21 RX ADMIN — CEFEPIME HYDROCHLORIDE 2000 MG: 2 INJECTION, POWDER, FOR SOLUTION INTRAVENOUS at 16:57

## 2021-02-21 RX ADMIN — VANCOMYCIN HYDROCHLORIDE 1000 MG: 10 INJECTION, POWDER, LYOPHILIZED, FOR SOLUTION INTRAVENOUS at 11:35

## 2021-02-21 RX ADMIN — GUAIFENESIN 1200 MG: 600 TABLET, EXTENDED RELEASE ORAL at 23:07

## 2021-02-21 RX ADMIN — NIFEDIPINE 60 MG: 60 TABLET, FILM COATED, EXTENDED RELEASE ORAL at 08:08

## 2021-02-21 RX ADMIN — IPRATROPIUM BROMIDE AND ALBUTEROL SULFATE 1 AMPULE: 2.5; .5 SOLUTION RESPIRATORY (INHALATION) at 19:09

## 2021-02-21 RX ADMIN — ASPIRIN 81 MG: 81 TABLET, COATED ORAL at 08:08

## 2021-02-21 RX ADMIN — GUAIFENESIN 1200 MG: 600 TABLET, EXTENDED RELEASE ORAL at 08:08

## 2021-02-21 RX ADMIN — IPRATROPIUM BROMIDE AND ALBUTEROL SULFATE 1 AMPULE: 2.5; .5 SOLUTION RESPIRATORY (INHALATION) at 10:50

## 2021-02-21 RX ADMIN — ATORVASTATIN CALCIUM 20 MG: 20 TABLET, FILM COATED ORAL at 08:08

## 2021-02-21 RX ADMIN — LACTULOSE 10 G: 20 SOLUTION ORAL at 08:09

## 2021-02-21 NOTE — PROGRESS NOTES
Pharmacy Vancomycin Consult     Vancomycin Day: 10  Current Dosin mg IV every 24 hours    Temp max:  98.2    Recent Labs     21  0253 21  0218   BUN 30* 32*       Recent Labs     21  0253 21  0218   CREATININE 1.3* 1.4*       Recent Labs     21  0253 21  0218   WBC 9.0 10.6         Intake/Output Summary (Last 24 hours) at 2021 1119  Last data filed at 2021 0934  Gross per 24 hour   Intake 610 ml   Output 325 ml   Net 285 ml       Culture Date Source Results   21 Blood Staph epidermidis   21 Blood No growth   21 Blood No growth       Ht Readings from Last 1 Encounters:   21 6' (1.829 m)        Wt Readings from Last 1 Encounters:   21 238 lb (108 kg)         Body mass index is 32.28 kg/m². Estimated Creatinine Clearance: 54 mL/min (A) (based on SCr of 1.4 mg/dL (H)). Trough: 18.1    Assessment/Plan: Continue vancomycin 1000 mg IV every 24 hours. Continue to monitor renal function. Additional levels ordered as needed.      Electronically signed by Yennifer Thomas, [facility] on 2021 at 11:19 AM

## 2021-02-21 NOTE — PROGRESS NOTES
PATIENT OXYGEN SATURATION ON RA WAS 93% .  PT ALSO WALKED WITH PT ON RA SAT STAYED 93% WHILE WALKING

## 2021-02-21 NOTE — PLAN OF CARE
Problem: Falls - Risk of:  Goal: Will remain free from falls  Outcome: Ongoing  Goal: Absence of physical injury  Outcome: Ongoing     Problem: Discharge Planning:  Goal: Discharged to appropriate level of care  Outcome: Ongoing     Problem: Gas Exchange - Impaired:  Goal: Levels of oxygenation will improve  Outcome: Ongoing     Problem: Infection, Septic Shock:  Goal: Will show no infection signs and symptoms  Outcome: Ongoing     Problem: Infection - Ventilator-Associated Pneumonia:  Goal: Absence of pulmonary infection  Outcome: Ongoing     Problem: Serum Glucose Level - Abnormal:  Goal: Ability to maintain appropriate glucose levels will improve  Outcome: Ongoing     Problem: Tissue Perfusion, Altered:  Goal: Circulatory function within specified parameters  Outcome: Ongoing     Problem: Venous Thromboembolism:  Goal: Will show no signs or symptoms of venous thromboembolism  Outcome: Ongoing  Goal: Absence of signs or symptoms of impaired coagulation  Outcome: Ongoing     Problem: Skin Integrity:  Goal: Will show no infection signs and symptoms  Outcome: Ongoing  Goal: Absence of new skin breakdown  Outcome: Ongoing

## 2021-02-21 NOTE — PROGRESS NOTES
Physical Therapy  Name: Lucrecia Mello  MRN:  [id number]  Date of service:  2/21/2021 02/21/21 1518   Subjective   Subjective Pt states he is willing to walk. Bed Mobility   Supine to Sit Modified independent   Sit to Supine Modified independent   Transfers   Sit to Stand Stand by assistance   Stand to sit Stand by assistance   Ambulation   Ambulation? Yes   Ambulation 1   Surface level tile   Device Rolling Walker   Assistance Stand by assistance   Quality of Gait flexed trunk   Distance 125'   Comments Pt amb without 02   Short term goals   Time Frame for Short term goals 2 wks   Short term goal 1 supine to sit indep   Short term goal 2 sit to stand indep   Short term goal 3 amb. 150' with RW SBA   Short term goal 4 bed to chair SBA   Conditions Requiring Skilled Therapeutic Intervention   Body structures, Functions, Activity limitations Decreased functional mobility ; Decreased endurance;Decreased posture   Assessment Pt 02 level 93% after amb in zambrano without 02 on. Activity Tolerance   Activity Tolerance Patient Tolerated treatment well   Safety Devices   Type of devices Left in chair;Call light within reach; Chair alarm in place     Electronically signed by Susanna Ganser, PTA on 2/21/2021 at 3:21 PM

## 2021-02-22 ENCOUNTER — TELEPHONE (OUTPATIENT)
Dept: NEUROLOGY | Age: 81
End: 2021-02-22

## 2021-02-22 LAB
ALBUMIN SERPL-MCNC: 2.7 G/DL (ref 3.5–5.2)
ALP BLD-CCNC: 107 U/L (ref 40–130)
ALT SERPL-CCNC: 33 U/L (ref 5–41)
ANION GAP SERPL CALCULATED.3IONS-SCNC: 11 MMOL/L (ref 7–19)
AST SERPL-CCNC: 18 U/L (ref 5–40)
BASOPHILS ABSOLUTE: 0 K/UL (ref 0–0.2)
BASOPHILS RELATIVE PERCENT: 0.1 % (ref 0–1)
BILIRUB SERPL-MCNC: 0.6 MG/DL (ref 0.2–1.2)
BUN BLDV-MCNC: 33 MG/DL (ref 8–23)
CALCIUM SERPL-MCNC: 8 MG/DL (ref 8.8–10.2)
CHLORIDE BLD-SCNC: 103 MMOL/L (ref 98–111)
CO2: 22 MMOL/L (ref 22–29)
CREAT SERPL-MCNC: 1.4 MG/DL (ref 0.5–1.2)
EOSINOPHILS ABSOLUTE: 0 K/UL (ref 0–0.6)
EOSINOPHILS RELATIVE PERCENT: 0 % (ref 0–5)
GFR AFRICAN AMERICAN: >59
GFR NON-AFRICAN AMERICAN: 49
GLUCOSE BLD-MCNC: 107 MG/DL (ref 70–99)
GLUCOSE BLD-MCNC: 122 MG/DL (ref 74–109)
GLUCOSE BLD-MCNC: 136 MG/DL (ref 70–99)
GLUCOSE BLD-MCNC: 164 MG/DL (ref 70–99)
GLUCOSE BLD-MCNC: 172 MG/DL (ref 70–99)
HCT VFR BLD CALC: 35.8 % (ref 42–52)
HEMOGLOBIN: 11.5 G/DL (ref 14–18)
IMMATURE GRANULOCYTES #: 0.1 K/UL
LYMPHOCYTES ABSOLUTE: 0.9 K/UL (ref 1.1–4.5)
LYMPHOCYTES RELATIVE PERCENT: 10.2 % (ref 20–40)
MCH RBC QN AUTO: 27.8 PG (ref 27–31)
MCHC RBC AUTO-ENTMCNC: 32.1 G/DL (ref 33–37)
MCV RBC AUTO: 86.5 FL (ref 80–94)
MONOCYTES ABSOLUTE: 0.8 K/UL (ref 0–0.9)
MONOCYTES RELATIVE PERCENT: 8.2 % (ref 0–10)
NEUTROPHILS ABSOLUTE: 7.5 K/UL (ref 1.5–7.5)
NEUTROPHILS RELATIVE PERCENT: 80.5 % (ref 50–65)
PDW BLD-RTO: 13.8 % (ref 11.5–14.5)
PERFORMED ON: ABNORMAL
PLATELET # BLD: 135 K/UL (ref 130–400)
PMV BLD AUTO: 12.4 FL (ref 9.4–12.4)
POTASSIUM SERPL-SCNC: 3.4 MMOL/L (ref 3.5–5)
RBC # BLD: 4.14 M/UL (ref 4.7–6.1)
SODIUM BLD-SCNC: 136 MMOL/L (ref 136–145)
TOTAL PROTEIN: 5.2 G/DL (ref 6.6–8.7)
TROPONIN: 0.03 NG/ML (ref 0–0.03)
WBC # BLD: 9.3 K/UL (ref 4.8–10.8)

## 2021-02-22 PROCEDURE — 94640 AIRWAY INHALATION TREATMENT: CPT

## 2021-02-22 PROCEDURE — 80053 COMPREHEN METABOLIC PANEL: CPT

## 2021-02-22 PROCEDURE — 85025 COMPLETE CBC W/AUTO DIFF WBC: CPT

## 2021-02-22 PROCEDURE — 6370000000 HC RX 637 (ALT 250 FOR IP): Performed by: HOSPITALIST

## 2021-02-22 PROCEDURE — 2580000003 HC RX 258: Performed by: INTERNAL MEDICINE

## 2021-02-22 PROCEDURE — 6360000002 HC RX W HCPCS: Performed by: INTERNAL MEDICINE

## 2021-02-22 PROCEDURE — 92526 ORAL FUNCTION THERAPY: CPT

## 2021-02-22 PROCEDURE — 6370000000 HC RX 637 (ALT 250 FOR IP): Performed by: INTERNAL MEDICINE

## 2021-02-22 PROCEDURE — 2700000000 HC OXYGEN THERAPY PER DAY

## 2021-02-22 PROCEDURE — 97116 GAIT TRAINING THERAPY: CPT

## 2021-02-22 PROCEDURE — 36415 COLL VENOUS BLD VENIPUNCTURE: CPT

## 2021-02-22 PROCEDURE — 82947 ASSAY GLUCOSE BLOOD QUANT: CPT

## 2021-02-22 PROCEDURE — 84484 ASSAY OF TROPONIN QUANT: CPT

## 2021-02-22 PROCEDURE — 2140000000 HC CCU INTERMEDIATE R&B

## 2021-02-22 RX ORDER — FAMOTIDINE 20 MG/1
40 TABLET, FILM COATED ORAL DAILY
Status: DISCONTINUED | OUTPATIENT
Start: 2021-02-22 | End: 2021-02-23

## 2021-02-22 RX ORDER — FAMOTIDINE 20 MG/1
40 TABLET, FILM COATED ORAL DAILY
Status: DISCONTINUED | OUTPATIENT
Start: 2021-02-22 | End: 2021-02-22

## 2021-02-22 RX ORDER — CALCIUM CARBONATE 200(500)MG
500 TABLET,CHEWABLE ORAL 3 TIMES DAILY PRN
Status: DISCONTINUED | OUTPATIENT
Start: 2021-02-22 | End: 2021-02-24 | Stop reason: HOSPADM

## 2021-02-22 RX ADMIN — ATORVASTATIN CALCIUM 20 MG: 20 TABLET, FILM COATED ORAL at 09:41

## 2021-02-22 RX ADMIN — ARFORMOTEROL TARTRATE 15 MCG: 15 SOLUTION RESPIRATORY (INHALATION) at 18:41

## 2021-02-22 RX ADMIN — DOXYCYCLINE HYCLATE 100 MG: 100 CAPSULE ORAL at 22:51

## 2021-02-22 RX ADMIN — ARFORMOTEROL TARTRATE 15 MCG: 15 SOLUTION RESPIRATORY (INHALATION) at 06:39

## 2021-02-22 RX ADMIN — IPRATROPIUM BROMIDE AND ALBUTEROL SULFATE 1 AMPULE: 2.5; .5 SOLUTION RESPIRATORY (INHALATION) at 10:13

## 2021-02-22 RX ADMIN — BUDESONIDE 500 MCG: 0.5 INHALANT RESPIRATORY (INHALATION) at 18:41

## 2021-02-22 RX ADMIN — IPRATROPIUM BROMIDE AND ALBUTEROL SULFATE 1 AMPULE: 2.5; .5 SOLUTION RESPIRATORY (INHALATION) at 06:22

## 2021-02-22 RX ADMIN — IPRATROPIUM BROMIDE AND ALBUTEROL SULFATE 1 AMPULE: 2.5; .5 SOLUTION RESPIRATORY (INHALATION) at 18:36

## 2021-02-22 RX ADMIN — IPRATROPIUM BROMIDE AND ALBUTEROL SULFATE 1 AMPULE: 2.5; .5 SOLUTION RESPIRATORY (INHALATION) at 22:01

## 2021-02-22 RX ADMIN — PANTOPRAZOLE SODIUM 40 MG: 40 TABLET, DELAYED RELEASE ORAL at 09:41

## 2021-02-22 RX ADMIN — GUAIFENESIN 1200 MG: 600 TABLET, EXTENDED RELEASE ORAL at 22:52

## 2021-02-22 RX ADMIN — LACTULOSE 10 G: 20 SOLUTION ORAL at 22:52

## 2021-02-22 RX ADMIN — FAMOTIDINE 40 MG: 20 TABLET, FILM COATED ORAL at 19:09

## 2021-02-22 RX ADMIN — DOXYCYCLINE HYCLATE 100 MG: 100 CAPSULE ORAL at 09:40

## 2021-02-22 RX ADMIN — NIFEDIPINE 60 MG: 60 TABLET, FILM COATED, EXTENDED RELEASE ORAL at 09:41

## 2021-02-22 RX ADMIN — DOCUSATE SODIUM 100 MG: 100 CAPSULE, LIQUID FILLED ORAL at 09:40

## 2021-02-22 RX ADMIN — ASPIRIN 81 MG: 81 TABLET, COATED ORAL at 09:41

## 2021-02-22 RX ADMIN — INSULIN LISPRO 1 UNITS: 100 INJECTION, SOLUTION INTRAVENOUS; SUBCUTANEOUS at 22:59

## 2021-02-22 RX ADMIN — IPRATROPIUM BROMIDE AND ALBUTEROL SULFATE 1 AMPULE: 2.5; .5 SOLUTION RESPIRATORY (INHALATION) at 14:47

## 2021-02-22 RX ADMIN — HYDRALAZINE HYDROCHLORIDE 25 MG: 25 TABLET, FILM COATED ORAL at 16:03

## 2021-02-22 RX ADMIN — HYDRALAZINE HYDROCHLORIDE 25 MG: 25 TABLET, FILM COATED ORAL at 22:52

## 2021-02-22 RX ADMIN — VANCOMYCIN HYDROCHLORIDE 1000 MG: 10 INJECTION, POWDER, LYOPHILIZED, FOR SOLUTION INTRAVENOUS at 16:04

## 2021-02-22 RX ADMIN — BUDESONIDE 500 MCG: 0.5 INHALANT RESPIRATORY (INHALATION) at 06:39

## 2021-02-22 RX ADMIN — INSULIN LISPRO 1 UNITS: 100 INJECTION, SOLUTION INTRAVENOUS; SUBCUTANEOUS at 13:04

## 2021-02-22 RX ADMIN — CEFEPIME HYDROCHLORIDE 2000 MG: 2 INJECTION, POWDER, FOR SOLUTION INTRAVENOUS at 22:55

## 2021-02-22 RX ADMIN — TROSPIUM CHLORIDE 20 MG: 20 TABLET, FILM COATED ORAL at 22:51

## 2021-02-22 RX ADMIN — GUAIFENESIN 1200 MG: 600 TABLET, EXTENDED RELEASE ORAL at 09:41

## 2021-02-22 RX ADMIN — HYDRALAZINE HYDROCHLORIDE 25 MG: 25 TABLET, FILM COATED ORAL at 05:33

## 2021-02-22 RX ADMIN — ANTACID TABLETS 500 MG: 500 TABLET, CHEWABLE ORAL at 02:57

## 2021-02-22 RX ADMIN — TAMSULOSIN HYDROCHLORIDE 0.4 MG: 0.4 CAPSULE ORAL at 09:41

## 2021-02-22 RX ADMIN — SODIUM CHLORIDE, PRESERVATIVE FREE 10 ML: 5 INJECTION INTRAVENOUS at 22:52

## 2021-02-22 RX ADMIN — STANDARDIZED SENNA CONCENTRATE 8.6 MG: 8.6 TABLET ORAL at 22:51

## 2021-02-22 RX ADMIN — ENOXAPARIN SODIUM 40 MG: 40 INJECTION SUBCUTANEOUS at 22:53

## 2021-02-22 RX ADMIN — METOPROLOL SUCCINATE 100 MG: 50 TABLET, EXTENDED RELEASE ORAL at 09:40

## 2021-02-22 RX ADMIN — SODIUM CHLORIDE, PRESERVATIVE FREE 10 ML: 5 INJECTION INTRAVENOUS at 09:40

## 2021-02-22 RX ADMIN — IPRATROPIUM BROMIDE AND ALBUTEROL SULFATE 1 AMPULE: 2.5; .5 SOLUTION RESPIRATORY (INHALATION) at 01:57

## 2021-02-22 RX ADMIN — CEFEPIME HYDROCHLORIDE 2000 MG: 2 INJECTION, POWDER, FOR SOLUTION INTRAVENOUS at 05:33

## 2021-02-22 NOTE — TELEPHONE ENCOUNTER
Called patient about an appointment change with Dr Joanna Graff, spoke with wife about the changed appointment, wife is aware of the changed appointment.

## 2021-02-22 NOTE — PROGRESS NOTES
INFECTIOUS DISEASES PROGRESS NOTE    Patient:  Vannie Fleischer  YOB: 1940  MRN: [id number]   Admit date: 2/12/2021   Admitting Physician: Angelo Alan MD  Primary Care Physician: Antonio Garibay MD    CHIEF COMPLAINT:   \"I am glad you are here I have some questions about the swelling\"       Interval History:   Patient reports she developed some upper extremity edema. He asked for some pillows yesterday, propped up his arms and thinks it is a little better. Patient notes that someone mentioned a skilled nursing facility however he states \"I am going home\"        Allergies: Allergies   Allergen Reactions    Eliquis [Apixaban] Other (See Comments)     \"almost bled to death\"    Promethazine Hcl Other (See Comments)     He became encephalopathic for several hours and was not responsive.        Current Meds:     calcium carbonate (TUMS) chewable tablet 500 mg, TID PRN      lactulose (CHRONULAC) 10 GM/15ML solution 10 g, TID      vancomycin (VANCOCIN) 1000 mg in dextrose 5% 250 mL IVPB, Q24H      doxycycline hyclate (VIBRAMYCIN) capsule 100 mg, 2 times per day      hydrALAZINE (APRESOLINE) tablet 25 mg, 3 times per day      technetium sestamibi (CARDIOLITE) injection 10 millicurie, ONCE PRN      technetium sestamibi (CARDIOLITE) injection 30 millicurie, ONCE PRN      guaiFENesin (ROBITUSSIN) 100 MG/5ML syrup 200 mg, Q4H PRN      Benzocaine-Menthol (CEPACOL) 1 lozenge, Q2H PRN      phenol 1.4 % mouth spray 1 spray, Q2H PRN      docusate sodium (COLACE) capsule 100 mg, Daily      senna (SENOKOT) tablet 8.6 mg, Nightly      bisacodyl (DULCOLAX) suppository 10 mg, Daily PRN      guaiFENesin (MUCINEX) extended release tablet 1,200 mg, BID      budesonide (PULMICORT) nebulizer suspension 500 mcg, BID      Arformoterol Tartrate (BROVANA) nebulizer solution 15 mcg, BID      0.9 % sodium chloride infusion, Continuous      ceFEPIme (MAXIPIME) 2,000 mg in sterile water 20 mL IV syringe, Q12H   vancomycin (VANCOCIN) intermittent dosing (placeholder), RX Placeholder      aspirin EC tablet 81 mg, Daily      atorvastatin (LIPITOR) tablet 20 mg, Daily      metoprolol succinate (TOPROL XL) extended release tablet 100 mg, Daily      NIFEdipine (PROCARDIA XL) extended release tablet 60 mg, Daily      pantoprazole (PROTONIX) tablet 40 mg, Daily      tamsulosin (FLOMAX) capsule 0.4 mg, Daily      trospium (SANCTURA) tablet 20 mg, Nightly      sodium chloride flush 0.9 % injection 10 mL, 2 times per day      sodium chloride flush 0.9 % injection 10 mL, PRN      enoxaparin (LOVENOX) injection 40 mg, Q24H      polyethylene glycol (GLYCOLAX) packet 17 g, Daily PRN      acetaminophen (TYLENOL) tablet 650 mg, Q6H PRN    Or      acetaminophen (TYLENOL) suppository 650 mg, Q6H PRN      insulin lispro (HUMALOG) injection vial 0-6 Units, TID WC      insulin lispro (HUMALOG) injection vial 0-3 Units, Nightly      ipratropium-albuterol (DUONEB) nebulizer solution 1 ampule, Q4H      glucose (GLUTOSE) 40 % oral gel 15 g, PRN      dextrose 50 % IV solution, PRN      glucagon (rDNA) injection 1 mg, PRN      dextrose 5 % solution, PRN        Review of Systems   General: No fever  Respiratory: Cough significantly improved    Vital Signs:  /69   Pulse 74   Temp 97.3 °F (36.3 °C)   Resp 17   Ht 6' (1.829 m)   Wt 238 lb (108 kg)   SpO2 91%   BMI 32.28 kg/m²   Temp (24hrs), Av.2 °F (36.8 °C), Min:97.3 °F (36.3 °C), Max:98.7 °F (37.1 °C)      Physical Exam  General: Patient is an elderly male lying in bed he was actually on the phone when I walked in and in no acute distress  HEENT: Sclera anicteric. O2 per nasal cannula in place. O2 is at 2 L/min. I located the patient's mask and handed it to him and he placed it over his nose and mouth  Respiratory: Effort even and unlabored. He is not conversationally dyspneic. Neuro: Alert and oriented, speech clear. He is hard of hearing.   Extremities: of declaration that circumstances exist justifying the   authorization of the emergency use of in vitro diagnostic   testing for detection of the SARS-CoV-2 virus   and/or diagnosis of COVID-19 infection under   section 564 (b)(1) of the Act, 21 U. S.C. 676LZL-4 (b) (1),   unless the authorization is terminated or revoked sooner. Patient Fact [employer]   Provider Fact [employer]     METHODOLOGY: Multiplex PCR         Human Metapneumovirus by PCR Not Detected     Human Rhinovirus/Enterovirus by PCR Not Detected     Influenza A by PCR Not Detected     Influenza B by PCR Not Detected     Mycoplasma pneumoniae by PCR Not Detected     Parainfluenza Virus 1 by PCR Not Detected     Parainfluenza Virus 2 by PCR Not Detected     Parainfluenza Virus 3 by PCR Not Detected     Parainfluenza Virus 4 by PCR Not Detected     Respiratory Syncytial Virus by PCR                  Culture, Blood 1 [[id number]] (Abnormal) Collected: 02/12/21 1546   Order Status: Completed Specimen: Blood Updated: 02/15/21 0633    Organism Staphylococcus coagulase-negativeAbnormal     Blood Culture, Routine --    Isolated from Aerobic bottle   No further workup   Refer to Blood Culture drawn on 2/12/21 at 16:15 for complete results    Narrative:     ORDER#: [id number]                          ORDERED BY: CELSA Riley   SOURCE: Blood Antecubital-Rig              COLLECTED:  02/12/21 15:46   ANTIBIOTICS AT [facility]. :                      RECEIVED :  02/12/21 15:55   CALL  [place]  [facility] tel. ,   Microbiology results called to and read back by Wade Vernon RN , 7th,   02/13/2021 12:56, by [facility]              IMAGING:                           RADIOLOGY      Ct Chest Wo Contrast    Result Date: 2/13/2021  EXAMINATION: CT CHEST WO CONTRAST 2/13/2021 2:17 PM HISTORY: Shortness of breath, bilateral pneumonia. DOSE: 976 mGycm.  Automatic exposure control was utilized in an effort to use as little radiation as possible, without compromising image quality. REPORT: Spiral CT of the chest was performed without contrast, reconstructed coronal and sagittal images are also reviewed. COMPARISON: Portable chest x-ray to 12/20/2021. Review of lung windows demonstrates consolidative infiltrates in both lungs, on the right, this includes extensive infiltrate surrounding the right hilum, with contiguous infiltrate in the right upper lobe, with air bronchograms, there is also consolidation of the right lower lobe with air bronchograms and focal subpleural nodular infiltrates are identified in the right middle lobe and right upper lobe focally. In the left lung, there is a subpleural infiltrate in the left upper lobe along the major fissure, with mild paraseptal emphysematous changes. This is contiguous with mixed groundglass and consolidative infiltrates in the lingula and there is consolidation of the left lower lobe with air bronchograms, with associated bronchial wall thickening. A small amount of left perihilar infiltrate is also present. No pneumothorax is identified, there is a trace left pleural effusion, small right pleural effusion. No lung abscess or cavitation is seen in association with the areas of pneumonia. Soft tissue windows show a 12 mm low-attenuation lesion in the right thyroid lobe, which is most likely benign. There are normal-sized shotty appearing lymph nodes within the mediastinum without measurable lymphadenopathy. A small amount calcified plaques noted within the aortic arch and there is heavy calcified plaque within the coronary arteries. Heart size is normal. There is trace pericardial fluid. The visualized upper abdomen shows no acute abnormality. There is mild bilateral gynecomastia. Review of bone windows shows advanced degenerative changes in the visualized upper lumbar spine.     Consolidating infiltrates within both lungs, greatest in the right lower lobe and most compatible with Brett Shanks Holly on 2/13/2021 3:21 PM    Xr Chest Portable    Result Date: 2/12/2021  EXAMINATION: XR CHEST PORTABLE 2/12/2021 4:13 PM HISTORY: Shortness of breath, hypoxia, recent pneumonia COMPARISON: 1/18/2021 FINDINGS: The heart appears normal in size. Atherosclerotic calcifications are seen in the aorta. Bilateral airspace opacities are present with relative sparing of the upper lobes, markedly increased compared to the prior exam. There is no appreciable pneumothorax or definite pleural effusion. Bilateral pneumonia, though worse when compared to the prior exam. Follow-up PA and lateral chest radiograph to recommended in 6-8 weeks to document resolution.  Signed by Dr Osmar Acevedo on 2/12/2021 4:14 PM                  Patient Active Problem List   Diagnosis    Diverticulitis of large intestine with perforation without bleeding    Generalized abdominal pain    Colonic diverticular abscess    Bilateral carotid artery stenosis    Atherosclerosis of native artery of both lower extremities with intermittent claudication ([address]    Carotid stenosis, asymptomatic, left    Primary osteoarthritis of left knee    Arthritis of knee    Essential hypertension    Pure hypercholesterolemia    Slow transit constipation    Iron deficiency anemia    GERD (gastroesophageal reflux disease)    Acute liver failure without hepatic coma    CHF (congestive heart failure) (HCC)    Bilateral pleural effusion    Palliative care patient    Shock liver    Acute renal failure (HCC)    BPH associated with nocturia    Bleeding diathesis ([address]    Epistaxis    Acute blood loss anemia    Melena    Hypocalcemia    Pneumonia due to infectious organism    Bladder cancer ([address]    Postoperative pain    History of bladder cancer    Severe sepsis (HCC)    Acute on chronic respiratory failure (HCC)    Acute on chronic kidney failure (HCC)    Hypoxemia    Metabolic acidosis    Acidosis, metabolic, with respiratory acidosis    Acute respiratory failure (HCC)    Type 2 diabetes mellitus with complication, without long-term current use of insulin (HCC)    Weakness    Other dysphagia    Low back pain    Sepsis (HCC)       IMPRESSION:  Positive blood cultures - Staph epi in one bottle of one set and second set - drawn in ED with difficulty per patient - also one bottle of one set - referred to other ( ie not identified)- Suspect drawn from right antecubital iv site in ED - contaminant. Not consistent with pulmonary pathogen     Bilateral pneumonia right greater than left (patient had bilateral infiltrates right greater than left on chest x-ray during his January admission) last dose of methylprednisolone 2/17    Acute kidney injuryimproved and stableBaseline? RECOMMENDATIONS :  Continue Mucinex  Wean O2 as tolerated  Noted attending note yesterday talking about skilled nursing facility. Patient mentioned to me today that he thought he was going home. Would continue vancomycin and cefepime for 14 daystoday is day #10.  continue doxycycline to p.o. for total of 14 days as well. If plans are for discharge home, can place orders for completing antibiotic therapy at home. His wife is a retired nurse per the patient. He would likely just need a good functioning IV and not a midline for just 4 more days. Mention to social work/case management in the hallway earlier that patient was not interested in going to a skilled nursing facility.     Douglas Talamantes MD

## 2021-02-22 NOTE — PROGRESS NOTES
02/22/21 1001   Restrictions/Precautions   Restrictions/Precautions Fall Risk   Required Braces or Orthoses? No   General   Chart Reviewed Yes   Additional Pertinent Hx recent hospital admit with PNA   Subjective   Subjective Patient sitting EOB agrees to walk   General Comment   Comments Patient now on RA   Pain Screening   Patient Currently in Pain Denies   Intervention List Patient able to continue with treatment   Vital Signs   Level of Consciousness Alert (0)   Oxygen Therapy   O2 Device None (Room air)   Pre Treatment Pain Screening   Pain at present 0   Scale Used Numeric Score   Intervention List Patient able to continue with treatment   Transfers   Sit to Stand Stand by assistance   Stand to sit Stand by assistance   Ambulation   More Ambulation? Yes   Ambulation 1   Surface level tile   Device Rolling Walker   Other Apparatus O2   Assistance Stand by assistance   Quality of Gait Flexed FWD. but steady   Gait Deviations Slow Nae;Decreased step length   Distance 150'   Comments SpO2 93% RA post gait   Activity Tolerance   Activity Tolerance Patient Tolerated treatment well   Safety Devices   Type of devices Left in chair;Call light within reach; Chair alarm in place   Physical Therapy    Electronically signed by Rodri Hernandez PTA on 2/22/2021 at 10:09 AM

## 2021-02-22 NOTE — PROGRESS NOTES
Speech Language Pathology  Facility/Department: [facility] 7 PROGRESSIVE CARE  SWALLOW THERAPY     NAME: Candido Nam  : 1940  MRN: [id number]    ADMISSION DATE: 2021  ADMITTING DIAGNOSIS: has Diverticulitis of large intestine with perforation without bleeding; Generalized abdominal pain; Colonic diverticular abscess; Bilateral carotid artery stenosis; Atherosclerosis of native artery of both lower extremities with intermittent claudication ([address] Carotid stenosis, asymptomatic, left; Primary osteoarthritis of left knee; Arthritis of knee; Essential hypertension; Pure hypercholesterolemia; Slow transit constipation; Iron deficiency anemia; GERD (gastroesophageal reflux disease); Acute liver failure without hepatic coma; CHF (congestive heart failure) ([address] Bilateral pleural effusion; Palliative care patient; Shock liver; Acute renal failure (HCC); BPH associated with nocturia; Bleeding diathesis ([address] Epistaxis; Acute blood loss anemia; Melena; Hypocalcemia; Pneumonia due to infectious organism; Bladder cancer ([address] Postoperative pain; History of bladder cancer; Severe sepsis ([address] Acute on chronic respiratory failure ([address] Acute on chronic kidney failure ([address] Hypoxemia; Metabolic acidosis; Acidosis, metabolic, with respiratory acidosis; Acute respiratory failure ([address] Type 2 diabetes mellitus with complication, without long-term current use of insulin ([address] Weakness; Other dysphagia; Low back pain; and Sepsis ([address] on their problem list.    Date of Treat: 2021  Evaluating Therapist: Charmaine Palacios    Current Diet level:  Regular solid consistency with nectar thick liquids    Reason for Referral  Candido Nam was referred for a bedside swallow evaluation to assess the efficiency of his swallow function, identify signs and symptoms of aspiration and make recommendations regarding safe dietary consistencies, effective compensatory strategies, and safe eating environment. Impression  Re-assessed patient's swallowing function. Patient exhibits slow, decreased oral prep of more solid consistencies as well as sluggish, moderate-severely decreased laryngeal elevation for swallow airway protection. Even so, no outward S/S penetration/aspiration was noted with any regular solid consistency presentation or thin H2O trial presented during treatment session this date. At this time, would trial thin liquids. Continue regular solid consistency. Recommend meds whole in pudding/applesauce. Treatment Plan  Requires SLP Intervention: Yes     Recommended Diet and Intervention  Diet Solids Recommendation: Regular solid   Liquid Consistency Recommendation: Thin  Recommended Form of Meds: Meds whole in puree as able    Compensatory Swallowing Strategies  Compensatory Swallowing Strategies: Upright as possible for all oral intake;Small bites/sips;Eat/Feed slowly; Alternate solids and liquids; Remain upright for 30-45 minutes after meals     General  Chart Reviewed: Yes  Behavior/Cognition: Alert; Cooperative  O2 Device: Nasal Cannula  Communication Observation: (SLP ranked functional intelligibility of speech for unfamiliar listeners at 100% in utterances with background noise present.)  Follows Directions: Simple   Patient Positioning: Upright in bed  Consistencies Administered: Regular solid;  Thin - cup      Re-assessed patient's swallowing function with the following observations noted:     Oral Phase  Mastication: Regular solid (Patient exhibited slow oral prep with decreased rotary jaw movement noted during regular solid consistency presentations administered independently.)  Suspected Premature Bolus Loss: Regular solid (Oral transit of regular solid consistency varied from 1-3 seconds in length.) Oral Phase - Comment: Min regular solid consistency residue was noted post swallows; residue cleared from the mouth with additional dry swallows. Oral transit of thin H2O trials, presented independently via cup, primarily measured 1-2 seconds in length. Pharyngeal Phase  Suspected Swallow Delay: Regular solid (Suspect secondary to oral transit times.)  Laryngeal Elevation: (Patient exhibited sluggish, moderate-severely decreased laryngeal elevation for swallow airway protection.)  Pharyngeal Phase - Comment: No outward S/S penetration/aspiration was noted with any regular solid consistency presentation or thin H2O trial presented during treatment session this date. At this time, would trial thin liquids. Continue regular solid consistency. Recommend meds whole in pudding/applesauce.     Electronically signed by HALINA Medina on 2/22/2021 at 12:02 PM

## 2021-02-22 NOTE — PROGRESS NOTES
[facility]        Hospitalist Progress Note  2/21/2021 6:34 PM  Subjective:   Admit Date: 2/12/2021  PCP: Melanie Whittington MD    Chief Complaint: Dyspnea    Subjective: Patient finally had a bowel movement after many days and he feels a little better. Still feels tired and fatigued. Cumulative Hospital History: 61-year-old obese male with a past medical history of COPD not on home oxygen, CKD stage III, diabetes, hypertension, BPH, h/o bladder CA recently admitted for bilateral pneumonia requiring intubation and mechanical intubation discharged to inpatient rehab (by me) with improvement and eventual discharge home. Patient presents back to the ED with worsening dyspnea found to have worsening bilateral pneumonia on CXR, poor PO intake and associated ANA on CKD and elevated troponin. Cardiology consulted for elevated troponin. Troponin trending down - needs improvement in pulmonary status prior to stress testing or intervention from cardiology - plans for stress test as an outpatient. Noted to have 2 of 2 bottles staph bacteremia and infectious disease consulted. Patient continues on broad spectrum antibiotics with likely need for 14 days of IV antibiotics. Constipation resolved with management. ROS: 14 point review of systems is negative except as specifically addressed above.     DIET CARDIAC; Carb Control: 4 carb choices (60 gms)/meal; Mildly Thick (Nectar)    Intake/Output Summary (Last 24 hours) at 2/21/2021 1834  Last data filed at 2/21/2021 1335  Gross per 24 hour   Intake 430 ml   Output 400 ml   Net 30 ml     Medications:   sodium chloride 100 mL/hr at 02/17/21 0540    dextrose       Current Facility-Administered Medications   Medication Dose Route Frequency Provider Last Rate Last Admin    lactulose (CHRONULAC) 10 GM/15ML solution 10 g  10 g Oral TID Cris Chung MD   10 g at 02/21/21 3446  vancomycin (VANCOCIN) 1000 mg in dextrose 5% 250 mL IVPB  1,000 mg Intravenous Q24H Colleen Walton MD   Stopped at 02/21/21 1330    doxycycline hyclate (VIBRAMYCIN) capsule 100 mg  100 mg Oral 2 times per day Gricelda Bhagat MD   100 mg at 02/21/21 8327    hydrALAZINE (APRESOLINE) tablet 25 mg  25 mg Oral 3 times per day Colleen Walton MD   25 mg at 02/21/21 1339    technetium sestamibi (CARDIOLITE) injection 10 millicurie  10 millicurie Intravenous ONCE PRN Meagan Gerardo MD        technetium sestamibi (CARDIOLITE) injection 30 millicurie  30 millicurie Intravenous ONCE PRN Meagan Gerardo MD        guaiFENesin (ROBITUSSIN) 100 MG/5ML syrup 200 mg  200 mg Oral Q4H PRN Colleen Walton MD        Benzocaine-Menthol (CEPACOL) 1 lozenge  1 lozenge Oral Q2H PRN Colleen Walton MD        phenol 1.4 % mouth spray 1 spray  1 spray Mouth/Throat Q2H PRN Colleen Walton MD        docusate sodium (COLACE) capsule 100 mg  100 mg Oral Daily Colleen Walton MD   100 mg at 02/21/21 6146    senna (SENOKOT) tablet 8.6 mg  1 tablet Oral Nightly Colleen Walton MD   8.6 mg at 02/20/21 2203    bisacodyl (DULCOLAX) suppository 10 mg  10 mg Rectal Daily PRN MD Nicolas McgarryaiFENesin [facility]) extended release tablet 1,200 mg  1,200 mg Oral BID Gricelda Bhagat MD   1,200 mg at 02/21/21 9432    budesonide (PULMICORT) nebulizer suspension 500 mcg  0.5 mg Nebulization BID Colleen Walton MD   500 mcg at 02/21/21 8242    Arformoterol Tartrate (BROVANA) nebulizer solution 15 mcg  15 mcg Nebulization BID Colleen Walton MD   15 mcg at 02/21/21 0646    0.9 % sodium chloride infusion   Intravenous Continuous Colleen Walton  mL/hr at 02/17/21 0540 New Bag at 02/17/21 0540    ceFEPIme (MAXIPIME) 2,000 mg in sterile water 20 mL IV syringe  2,000 mg Intravenous Q12H Cheli Hancock MD   2,000 mg at 02/21/21 2300  vancomycin (VANCOCIN) intermittent dosing (placeholder)   Other RX Placeholder Ananda Hamlin MD        aspirin EC tablet 81 mg  81 mg Oral Daily Anandakaela Hamlin MD   81 mg at 02/21/21 6916    atorvastatin (LIPITOR) tablet 20 mg  20 mg Oral Daily Anandakaela Hamlin MD   20 mg at 02/21/21 5644    metoprolol succinate (TOPROL XL) extended release tablet 100 mg  100 mg Oral Daily Anandakaela Hamlin MD   100 mg at 02/21/21 5586    NIFEdipine (PROCARDIA XL) extended release tablet 60 mg  60 mg Oral Daily Anandakaela Hamlin MD   60 mg at 02/21/21 7664    pantoprazole (PROTONIX) tablet 40 mg  40 mg Oral Daily Anandakaela Hamlin MD   40 mg at 02/21/21 3445    tamsulosin (FLOMAX) capsule 0.4 mg  0.4 mg Oral Daily Anandakaela Hamlin MD   0.4 mg at 02/21/21 7509    trospium (SANCTURA) tablet 20 mg  20 mg Oral Nightly Anandakaela Hamlin MD   20 mg at 02/20/21 2203    sodium chloride flush 0.9 % injection 10 mL  10 mL Intravenous 2 times per day Anandakaela Hamlin MD   10 mL at 02/20/21 2203    sodium chloride flush 0.9 % injection 10 mL  10 mL Intravenous PRN Ananda Hamlin MD   10 mL at 02/18/21 1647    enoxaparin (LOVENOX) injection 40 mg  40 mg Subcutaneous Q24H Anandakaela Hamlin MD   40 mg at 02/20/21 2202    polyethylene glycol (GLYCOLAX) packet 17 g  17 g Oral Daily PRN Anandakaela Hamlin MD   17 g at 02/14/21 2233    acetaminophen (TYLENOL) tablet 650 mg  650 mg Oral Q6H PRN Ananda Hamlin MD        Or    acetaminophen (TYLENOL) suppository 650 mg  650 mg Rectal Q6H PRN Ananda Hamlin MD        insulin lispro (HUMALOG) injection vial 0-6 Units  0-6 Units Subcutaneous TID WC Ananda Hamlin MD   1 Units at 02/21/21 1156    insulin lispro (HUMALOG) injection vial 0-3 Units  0-3 Units Subcutaneous Nightly Anandakaela Hamlin MD   1 Units at 02/19/21 2154    ipratropium-albuterol (DUONEB) nebulizer solution 1 ampule  1 ampule Inhalation Q4H Anandakaela Hamlin MD   1 ampule at 02/21/21 1423  glucose (GLUTOSE) 40 % oral gel 15 g  15 g Oral PRN Pacsale Peng MD        dextrose 50 % IV solution  12.5 g Intravenous PRN Pascale Peng MD        glucagon (rDNA) injection 1 mg  1 mg Intramuscular PRN Pascale Peng MD        dextrose 5 % solution  100 mL/hr Intravenous PRN Pascale Peng MD            Labs:     Recent Labs     02/19/21 0134 02/20/21 0253 02/21/21 0218   WBC 10.9* 9.0 10.6   RBC 4.39* 4.44* 4.20*   HGB 12.3* 12.4* 11.8*   HCT 38.1* 39.1* 37.4*   MCV 86.8 88.1 89.0   MCH 28.0 27.9 28.1   MCHC 32.3* 31.7* 31.6*    154 160     Recent Labs     02/19/21 0134 02/20/21 0253 02/21/21 0218    136 138   K 3.9 3.7 3.8   ANIONGAP 10 9 11    101 103   CO2 25 26 24   BUN 32* 30* 32*   CREATININE 1.3* 1.3* 1.4*   GLUCOSE 127* 91 127*   CALCIUM 8.4* 8.2* 8.1*     Recent Labs     02/19/21 0134 02/20/21  0253 02/21/21  0218   MG 1.5* 2.1 1.9     Recent Labs     02/19/21 0134 02/20/21 0253 02/21/21  0218   AST 18 25 22   ALT 37 39 37   BILITOT 0.5 0.4 0.4   ALKPHOS 72 83 95     ABGs:No results for input(s): PH, PO2, PCO2, HCO3, BE, O2SAT in the last 72 hours. Troponin T:   No results for input(s): TROPONINI in the last 72 hours. INR: No results for input(s): INR in the last 72 hours. Lactic Acid:   No results for input(s): LACTA in the last 72 hours. Objective:   Vitals: /63   Pulse 75   Temp 98.6 °F (37 °C) (Temporal)   Resp 14   Ht 6' (1.829 m)   Wt 238 lb (108 kg)   SpO2 94%   BMI 32.28 kg/m²   24HR INTAKE/OUTPUT:      Intake/Output Summary (Last 24 hours) at 2/21/2021 1834  Last data filed at 2/21/2021 1335  Gross per 24 hour   Intake 430 ml   Output 400 ml   Net 30 ml     General appearance: alert and cooperative with exam  HEENT: AT/NC  Lungs: BLAE  Heart: RRR  Abdomen: BS+, soft, NT, ND  Extremities: pulses+  Neurologic: Alert, gross motor function intact  Skin: warm    Assessment and Plan:    Active Problems:    Sepsis ([address]  Resolved Problems: * No resolved hospital problems. *    Pneumonia/COPD Exacerbation: Broad spectrum antibiotics - Day 10 Vancomycin/Cefepime. Day 9 Doxycycline. Bronchodilators. Wean O2 as tolerated. Leukocytosis: Resolved. Constipation: Resolved. Dizziness: Resolved. CT Head unremarkable. Elevated troponin: Cardiology has seen during admission. Further workup including likely stress test as outpatient. Repeat troponin in a.m. ANA/CKD III: Monitor BMP. DM: HbA1c 6.2. Monitor BG and adjust medications PRN. Supportive management. PT/OT/SLP. Advance Directive: Full Code    DVT prophylaxis: Lovenox    Discharge planning: PT/OT evaluation ordered. DC planning likely to skilled nursing facility in the next couple of days.       Avinash Mancia MD 2/21/2021 6:34 PM  Rounding Hospitalist

## 2021-02-23 LAB
ALBUMIN SERPL-MCNC: 2.7 G/DL (ref 3.5–5.2)
ALP BLD-CCNC: 115 U/L (ref 40–130)
ALT SERPL-CCNC: 28 U/L (ref 5–41)
ANION GAP SERPL CALCULATED.3IONS-SCNC: 11 MMOL/L (ref 7–19)
AST SERPL-CCNC: 19 U/L (ref 5–40)
BASOPHILS ABSOLUTE: 0 K/UL (ref 0–0.2)
BASOPHILS RELATIVE PERCENT: 0.2 % (ref 0–1)
BILIRUB SERPL-MCNC: 0.5 MG/DL (ref 0.2–1.2)
BUN BLDV-MCNC: 32 MG/DL (ref 8–23)
CALCIUM SERPL-MCNC: 8 MG/DL (ref 8.8–10.2)
CHLORIDE BLD-SCNC: 101 MMOL/L (ref 98–111)
CO2: 21 MMOL/L (ref 22–29)
CREAT SERPL-MCNC: 1.7 MG/DL (ref 0.5–1.2)
EOSINOPHILS ABSOLUTE: 0 K/UL (ref 0–0.6)
EOSINOPHILS RELATIVE PERCENT: 0.1 % (ref 0–5)
GFR AFRICAN AMERICAN: 47
GFR NON-AFRICAN AMERICAN: 39
GLUCOSE BLD-MCNC: 117 MG/DL (ref 74–109)
GLUCOSE BLD-MCNC: 129 MG/DL (ref 70–99)
GLUCOSE BLD-MCNC: 145 MG/DL (ref 70–99)
GLUCOSE BLD-MCNC: 159 MG/DL (ref 70–99)
GLUCOSE BLD-MCNC: 182 MG/DL (ref 70–99)
HCT VFR BLD CALC: 34.2 % (ref 42–52)
HEMOGLOBIN: 11 G/DL (ref 14–18)
IMMATURE GRANULOCYTES #: 0.1 K/UL
LYMPHOCYTES ABSOLUTE: 0.9 K/UL (ref 1.1–4.5)
LYMPHOCYTES RELATIVE PERCENT: 10 % (ref 20–40)
MCH RBC QN AUTO: 28.1 PG (ref 27–31)
MCHC RBC AUTO-ENTMCNC: 32.2 G/DL (ref 33–37)
MCV RBC AUTO: 87.2 FL (ref 80–94)
MONOCYTES ABSOLUTE: 1 K/UL (ref 0–0.9)
MONOCYTES RELATIVE PERCENT: 11.1 % (ref 0–10)
NEUTROPHILS ABSOLUTE: 6.8 K/UL (ref 1.5–7.5)
NEUTROPHILS RELATIVE PERCENT: 77.6 % (ref 50–65)
PDW BLD-RTO: 13.9 % (ref 11.5–14.5)
PERFORMED ON: ABNORMAL
PLATELET # BLD: 120 K/UL (ref 130–400)
PMV BLD AUTO: 12.7 FL (ref 9.4–12.4)
POTASSIUM SERPL-SCNC: 3.7 MMOL/L (ref 3.5–5)
RBC # BLD: 3.92 M/UL (ref 4.7–6.1)
REASON FOR REJECTION: NORMAL
REJECTED TEST: NORMAL
SODIUM BLD-SCNC: 133 MMOL/L (ref 136–145)
TOTAL PROTEIN: 4.6 G/DL (ref 6.6–8.7)
VANCOMYCIN RANDOM: 22.7 UG/ML
WBC # BLD: 8.8 K/UL (ref 4.8–10.8)

## 2021-02-23 PROCEDURE — 6370000000 HC RX 637 (ALT 250 FOR IP): Performed by: INTERNAL MEDICINE

## 2021-02-23 PROCEDURE — 97116 GAIT TRAINING THERAPY: CPT

## 2021-02-23 PROCEDURE — 92526 ORAL FUNCTION THERAPY: CPT

## 2021-02-23 PROCEDURE — 80053 COMPREHEN METABOLIC PANEL: CPT

## 2021-02-23 PROCEDURE — 36415 COLL VENOUS BLD VENIPUNCTURE: CPT

## 2021-02-23 PROCEDURE — 2580000003 HC RX 258: Performed by: INTERNAL MEDICINE

## 2021-02-23 PROCEDURE — 6360000002 HC RX W HCPCS: Performed by: INTERNAL MEDICINE

## 2021-02-23 PROCEDURE — 80202 ASSAY OF VANCOMYCIN: CPT

## 2021-02-23 PROCEDURE — 6370000000 HC RX 637 (ALT 250 FOR IP): Performed by: HOSPITALIST

## 2021-02-23 PROCEDURE — 94640 AIRWAY INHALATION TREATMENT: CPT

## 2021-02-23 PROCEDURE — 94761 N-INVAS EAR/PLS OXIMETRY MLT: CPT

## 2021-02-23 PROCEDURE — 94762 N-INVAS EAR/PLS OXIMTRY CONT: CPT

## 2021-02-23 PROCEDURE — 2140000000 HC CCU INTERMEDIATE R&B

## 2021-02-23 PROCEDURE — 82947 ASSAY GLUCOSE BLOOD QUANT: CPT

## 2021-02-23 PROCEDURE — 85025 COMPLETE CBC W/AUTO DIFF WBC: CPT

## 2021-02-23 RX ORDER — FAMOTIDINE 20 MG/1
20 TABLET, FILM COATED ORAL DAILY
Status: DISCONTINUED | OUTPATIENT
Start: 2021-02-24 | End: 2021-02-24 | Stop reason: HOSPADM

## 2021-02-23 RX ADMIN — GUAIFENESIN 1200 MG: 600 TABLET, EXTENDED RELEASE ORAL at 20:24

## 2021-02-23 RX ADMIN — LACTULOSE 10 G: 20 SOLUTION ORAL at 14:39

## 2021-02-23 RX ADMIN — BUDESONIDE 500 MCG: 0.5 INHALANT RESPIRATORY (INHALATION) at 18:28

## 2021-02-23 RX ADMIN — NIFEDIPINE 60 MG: 60 TABLET, FILM COATED, EXTENDED RELEASE ORAL at 08:49

## 2021-02-23 RX ADMIN — HYDRALAZINE HYDROCHLORIDE 25 MG: 25 TABLET, FILM COATED ORAL at 05:54

## 2021-02-23 RX ADMIN — IPRATROPIUM BROMIDE AND ALBUTEROL SULFATE 1 AMPULE: 2.5; .5 SOLUTION RESPIRATORY (INHALATION) at 18:16

## 2021-02-23 RX ADMIN — DOXYCYCLINE HYCLATE 100 MG: 100 CAPSULE ORAL at 20:24

## 2021-02-23 RX ADMIN — IPRATROPIUM BROMIDE AND ALBUTEROL SULFATE 1 AMPULE: 2.5; .5 SOLUTION RESPIRATORY (INHALATION) at 22:10

## 2021-02-23 RX ADMIN — TROSPIUM CHLORIDE 20 MG: 20 TABLET, FILM COATED ORAL at 20:24

## 2021-02-23 RX ADMIN — HYDRALAZINE HYDROCHLORIDE 25 MG: 25 TABLET, FILM COATED ORAL at 20:24

## 2021-02-23 RX ADMIN — HYDRALAZINE HYDROCHLORIDE 25 MG: 25 TABLET, FILM COATED ORAL at 14:40

## 2021-02-23 RX ADMIN — INSULIN LISPRO 1 UNITS: 100 INJECTION, SOLUTION INTRAVENOUS; SUBCUTANEOUS at 18:11

## 2021-02-23 RX ADMIN — ATORVASTATIN CALCIUM 20 MG: 20 TABLET, FILM COATED ORAL at 08:49

## 2021-02-23 RX ADMIN — IPRATROPIUM BROMIDE AND ALBUTEROL SULFATE 1 AMPULE: 2.5; .5 SOLUTION RESPIRATORY (INHALATION) at 14:16

## 2021-02-23 RX ADMIN — GUAIFENESIN 1200 MG: 600 TABLET, EXTENDED RELEASE ORAL at 08:49

## 2021-02-23 RX ADMIN — FAMOTIDINE 40 MG: 20 TABLET, FILM COATED ORAL at 08:49

## 2021-02-23 RX ADMIN — IPRATROPIUM BROMIDE AND ALBUTEROL SULFATE 1 AMPULE: 2.5; .5 SOLUTION RESPIRATORY (INHALATION) at 06:50

## 2021-02-23 RX ADMIN — IPRATROPIUM BROMIDE AND ALBUTEROL SULFATE 1 AMPULE: 2.5; .5 SOLUTION RESPIRATORY (INHALATION) at 02:11

## 2021-02-23 RX ADMIN — ARFORMOTEROL TARTRATE 15 MCG: 15 SOLUTION RESPIRATORY (INHALATION) at 06:50

## 2021-02-23 RX ADMIN — STANDARDIZED SENNA CONCENTRATE 8.6 MG: 8.6 TABLET ORAL at 20:24

## 2021-02-23 RX ADMIN — ARFORMOTEROL TARTRATE 15 MCG: 15 SOLUTION RESPIRATORY (INHALATION) at 18:28

## 2021-02-23 RX ADMIN — TAMSULOSIN HYDROCHLORIDE 0.4 MG: 0.4 CAPSULE ORAL at 08:49

## 2021-02-23 RX ADMIN — BUDESONIDE 500 MCG: 0.5 INHALANT RESPIRATORY (INHALATION) at 06:50

## 2021-02-23 RX ADMIN — SODIUM CHLORIDE, PRESERVATIVE FREE 10 ML: 5 INJECTION INTRAVENOUS at 20:25

## 2021-02-23 RX ADMIN — CEFEPIME HYDROCHLORIDE 2000 MG: 2 INJECTION, POWDER, FOR SOLUTION INTRAVENOUS at 18:09

## 2021-02-23 RX ADMIN — DOCUSATE SODIUM 100 MG: 100 CAPSULE, LIQUID FILLED ORAL at 08:49

## 2021-02-23 RX ADMIN — CEFEPIME HYDROCHLORIDE 2000 MG: 2 INJECTION, POWDER, FOR SOLUTION INTRAVENOUS at 05:55

## 2021-02-23 RX ADMIN — METOPROLOL SUCCINATE 100 MG: 50 TABLET, EXTENDED RELEASE ORAL at 08:50

## 2021-02-23 RX ADMIN — ASPIRIN 81 MG: 81 TABLET, COATED ORAL at 08:49

## 2021-02-23 RX ADMIN — ENOXAPARIN SODIUM 40 MG: 40 INJECTION SUBCUTANEOUS at 20:24

## 2021-02-23 RX ADMIN — SODIUM CHLORIDE, PRESERVATIVE FREE 10 ML: 5 INJECTION INTRAVENOUS at 08:50

## 2021-02-23 RX ADMIN — LACTULOSE 10 G: 20 SOLUTION ORAL at 08:49

## 2021-02-23 RX ADMIN — PANTOPRAZOLE SODIUM 40 MG: 40 TABLET, DELAYED RELEASE ORAL at 08:49

## 2021-02-23 RX ADMIN — IPRATROPIUM BROMIDE AND ALBUTEROL SULFATE 1 AMPULE: 2.5; .5 SOLUTION RESPIRATORY (INHALATION) at 10:32

## 2021-02-23 RX ADMIN — DOXYCYCLINE HYCLATE 100 MG: 100 CAPSULE ORAL at 08:50

## 2021-02-23 ASSESSMENT — PAIN SCALES - GENERAL: PAINLEVEL_OUTOF10: 0

## 2021-02-23 NOTE — PROGRESS NOTES
Speech Language Pathology  Facility/Department: [facility] 7 PROGRESSIVE CARE  SWALLOW THERAPY     NAME: Zohaib Jimenez  : 1940  MRN: [id number]    ADMISSION DATE: 2021  ADMITTING DIAGNOSIS: has Diverticulitis of large intestine with perforation without bleeding; Generalized abdominal pain; Colonic diverticular abscess; Bilateral carotid artery stenosis; Atherosclerosis of native artery of both lower extremities with intermittent claudication ([address] Carotid stenosis, asymptomatic, left; Primary osteoarthritis of left knee; Arthritis of knee; Essential hypertension; Pure hypercholesterolemia; Slow transit constipation; Iron deficiency anemia; GERD (gastroesophageal reflux disease); Acute liver failure without hepatic coma; CHF (congestive heart failure) ([address] Bilateral pleural effusion; Palliative care patient; Shock liver; Acute renal failure (HCC); BPH associated with nocturia; Bleeding diathesis ([address] Epistaxis; Acute blood loss anemia; Melena; Hypocalcemia; Pneumonia due to infectious organism; Bladder cancer ([address] Postoperative pain; History of bladder cancer; Severe sepsis ([address] Acute on chronic respiratory failure ([address] Acute on chronic kidney failure ([address] Hypoxemia; Metabolic acidosis; Acidosis, metabolic, with respiratory acidosis; Acute respiratory failure ([address] Type 2 diabetes mellitus with complication, without long-term current use of insulin ([address] Weakness; Other dysphagia; Low back pain; and Sepsis ([address] on their problem list.    Date of Treat: 2021  Evaluating Therapist: Denisha Jones    Current Diet level:  Regular solid consistency with thin liquids    Reason for Referral  Zohaib Jimenez was referred for a bedside swallow evaluation to assess the efficiency of his swallow function, identify signs and symptoms of aspiration and make recommendations regarding safe dietary consistencies, effective compensatory strategies, and safe eating environment.     Impression Monitored patient's swallowing function. Patient exhibits slow, decreased oral prep of more solid consistencies as well as sluggish, moderate-severely decreased laryngeal elevation for swallow airway protection. Even so, no outward S/S penetration/aspiration was noted with any regular solid consistency presentation or thin liquid presentation administered  during treatment session this date.     At this time, would recommend continuation regular solid consistency and thin liquids. Self-feed. Recommend meds whole in pudding/applesauce.     Treatment Plan  Requires SLP Intervention: Yes     Recommended Diet and Intervention  Diet Solids Recommendation: Regular solid   Liquid Consistency Recommendation: Thin  Recommended Form of Meds: Meds whole in puree as able     Compensatory Swallowing Strategies  Compensatory Swallowing Strategies: Upright as possible for all oral intake;Small bites/sips;Eat/Feed slowly; Alternate solids and liquids; Remain upright for 30-45 minutes after meals     General  Chart Reviewed: Yes  Behavior/Cognition: Alert; Cooperative  O2 Device: Nasal Cannula  Communication Observation: (SLP ranked functional intelligibility of speech for unfamiliar listeners at 100% in utterances with background noise present.)  Follows Directions: Simple   Patient Positioning: Upright in bed  Consistencies Administered: Regular solid;  Thin - cup      Monitored patient's swallowing function with the following observations noted:     Oral Phase  Mastication: Regular solid (Patient exhibited slow oral prep with decreased rotary jaw movement noted during regular solid consistency presentations administered independently.)  Suspected Premature Bolus Loss: Regular solid (Oral transit of regular solid consistency varied from 1-3 seconds in length.) Oral Phase - Comment: Min regular solid consistency residue was noted post swallows; residue cleared from the mouth with additional dry swallows. Oral transit of thin liquid presentations, administered independently via cup, primarily measured 1-2 seconds in length.     Pharyngeal Phase  Suspected Swallow Delay: Regular solid (Suspect secondary to oral transit times.)  Laryngeal Elevation: (Patient exhibited sluggish, moderate-severely decreased laryngeal elevation for swallow airway protection.)  Pharyngeal Phase - Comment: No outward S/S penetration/aspiration was noted with any regular solid consistency presentation or thin liquid presentation administered during treatment session this date.     At this time, would recommend continuation regular solid consistency and thin liquids. Self-feed. Recommend meds whole in pudding/applesauce.     Electronically signed by HALINA Lundy on 2/23/2021 at 1:09 PM

## 2021-02-23 NOTE — PROGRESS NOTES
INFECTIOUS DISEASES PROGRESS NOTE    Patient:  Nahum Fernandes  YOB: 1940  MRN: [id number]   Admit date: 2/12/2021   Admitting Physician: Telma Garland MD  Primary Care Physician: Masoud Soriano MD    CHIEF COMPLAINT:   \"I think the head nurse has got everything straightened out\"    Interval History: Patient states that he understands that he is going to get his dose of antibiotic here tonight and be discharged tomorrow. I told him I was going to put orders in as they were working on the timing of that currently. He still has upper extremity edema that is worse in his hand but he thinks because it was not elevated during the night      Allergies: Allergies   Allergen Reactions    Eliquis [Apixaban] Other (See Comments)     \"almost bled to death\"    Promethazine Hcl Other (See Comments)     He became encephalopathic for several hours and was not responsive.        Current Meds:     calcium carbonate (TUMS) chewable tablet 500 mg, TID PRN      famotidine (PEPCID) tablet 40 mg, Daily      lactulose (CHRONULAC) 10 GM/15ML solution 10 g, TID      vancomycin (VANCOCIN) 1000 mg in dextrose 5% 250 mL IVPB, Q24H      doxycycline hyclate (VIBRAMYCIN) capsule 100 mg, 2 times per day      hydrALAZINE (APRESOLINE) tablet 25 mg, 3 times per day      technetium sestamibi (CARDIOLITE) injection 10 millicurie, ONCE PRN      technetium sestamibi (CARDIOLITE) injection 30 millicurie, ONCE PRN      guaiFENesin (ROBITUSSIN) 100 MG/5ML syrup 200 mg, Q4H PRN      Benzocaine-Menthol (CEPACOL) 1 lozenge, Q2H PRN      phenol 1.4 % mouth spray 1 spray, Q2H PRN      docusate sodium (COLACE) capsule 100 mg, Daily      senna (SENOKOT) tablet 8.6 mg, Nightly      bisacodyl (DULCOLAX) suppository 10 mg, Daily PRN      guaiFENesin (MUCINEX) extended release tablet 1,200 mg, BID      budesonide (PULMICORT) nebulizer suspension 500 mcg, BID      Arformoterol Tartrate (BROVANA) nebulizer solution 15 mcg, BID   0.9 % sodium chloride infusion, Continuous      ceFEPIme (MAXIPIME) 2,000 mg in sterile water 20 mL IV syringe, Q12H      vancomycin (VANCOCIN) intermittent dosing (placeholder), RX Placeholder      aspirin EC tablet 81 mg, Daily      atorvastatin (LIPITOR) tablet 20 mg, Daily      metoprolol succinate (TOPROL XL) extended release tablet 100 mg, Daily      NIFEdipine (PROCARDIA XL) extended release tablet 60 mg, Daily      pantoprazole (PROTONIX) tablet 40 mg, Daily      tamsulosin (FLOMAX) capsule 0.4 mg, Daily      trospium (SANCTURA) tablet 20 mg, Nightly      sodium chloride flush 0.9 % injection 10 mL, 2 times per day      sodium chloride flush 0.9 % injection 10 mL, PRN      enoxaparin (LOVENOX) injection 40 mg, Q24H      polyethylene glycol (GLYCOLAX) packet 17 g, Daily PRN      acetaminophen (TYLENOL) tablet 650 mg, Q6H PRN    Or      acetaminophen (TYLENOL) suppository 650 mg, Q6H PRN      insulin lispro (HUMALOG) injection vial 0-6 Units, TID WC      insulin lispro (HUMALOG) injection vial 0-3 Units, Nightly      ipratropium-albuterol (DUONEB) nebulizer solution 1 ampule, Q4H      glucose (GLUTOSE) 40 % oral gel 15 g, PRN      dextrose 50 % IV solution, PRN      glucagon (rDNA) injection 1 mg, PRN      dextrose 5 % solution, PRN        Review of Systems   General: No fever  Respiratory: No residual cough  Extremities: Upper extremity edema worse in the left hand today      Vital Signs:  BP (!) 173/81   Pulse 83   Temp 97.5 °F (36.4 °C) (Temporal)   Resp 18   Ht 6' (1.829 m)   Wt 238 lb (108 kg)   SpO2 92%   BMI 32.28 kg/m²   Temp (24hrs), Av.7 °F (36.5 °C), Min:97.5 °F (36.4 °C), Max:98 °F (36.7 °C)      Physical Exam  General: Patient is eating up in his recliner in no acute distress  HEENT: Sclera anicteric. O2 per nasal cannula in place. O2 is at 2 L/min.   I handed the patient his mask and he placed it over his nose and mouth Respiratory: Effort even and unlabored. He is not conversationally dyspneic. Neuro: Alert and oriented, speech clear. He is hard of hearing. Extremities: Patient does have edema of the bilateral upper extremities. Overall no worse in the right upper extremity but his left hand is more edematous.   Psych: Pleasant cooperative        LAB RESULTS:    CBC with DIFF:  Recent Labs     02/21/21 0218 02/22/21  0543 02/23/21  0617   WBC 10.6 9.3 8.8   RBC 4.20* 4.14* 3.92*   HGB 11.8* 11.5* 11.0*   HCT 37.4* 35.8* 34.2*   MCV 89.0 86.5 87.2   MCH 28.1 27.8 28.1   MCHC 31.6* 32.1* 32.2*   RDW 13.8 13.8 13.9    135 120*   MPV 12.0 12.4 12.7*   NEUTOPHILPCT 81.9* 80.5* 77.6*   LYMPHOPCT 8.0* 10.2* 10.0*   MONOPCT 8.5 8.2 11.1*   EOSRELPCT 0.0 0.0 0.1   BASOPCT 0.1 0.1 0.2   NEUTROABS 8.7* 7.5 6.8   LYMPHSABS 0.9* 0.9* 0.9*   MONOSABS 0.90 0.80 1.00*   EOSABS 0.00 0.00 0.00   BASOSABS 0.00 0.00 0.00       CMP/BMP:  Recent Labs     02/21/21 0218 02/22/21  0543 02/23/21  0305    136 133*   K 3.8 3.4* 3.7    103 101   CO2 24 22 21*   ANIONGAP 11 11 11   GLUCOSE 127* 122* 117*   BUN 32* 33* 32*   CREATININE 1.4* 1.4* 1.7*   LABGLOM 49* 49* 39*   CALCIUM 8.1* 8.0* 8.0*   PROT 4.9* 5.2* 4.6*   LABALBU 2.9* 2.7* 2.7*   BILITOT 0.4 0.6 0.5   ALKPHOS 95 107 115   ALT 37 33 28   AST 22 18 19                  Respiratory Panel, Molecular, with COVID-19 (Restricted: peds pts or suitable admitted adults) [[id number]] Collected: 02/12/21 1615   Order Status: Completed Specimen: Nasopharyngeal Updated: 02/12/21 1713     Adenovirus by PCR Not Detected     Bordetella parapertussis by PCR Not Detected     Bordetella pertussis by PCR Not Detected     Chlamydophilia pneumoniae by PCR Not Detected     Coronavirus 229E by PCR Not Detected     Coronavirus HKU1 by PCR Not Detected     Coronavirus NL63 by PCR Not Detected     Coronavirus OC43 by PCR Not Detected     SARS-CoV-2, PCR Not Detected   Comment: This test has been authorized by FDA under an   Emergency Use Authorization (EUA). This test is only authorized for the duration of the   time of declaration that circumstances exist justifying the   authorization of the emergency use of in vitro diagnostic   testing for detection of the SARS-CoV-2 virus   and/or diagnosis of COVID-19 infection under   section 564 (b)(1) of the Act, 21 U. S.C. 865ADZ-9 (b) (1),   unless the authorization is terminated or revoked sooner. Patient Fact SettlementContracts.gl   Provider Fact SettlementContracts.gl     METHODOLOGY: Multiplex PCR         Human Metapneumovirus by PCR Not Detected     Human Rhinovirus/Enterovirus by PCR Not Detected     Influenza A by PCR Not Detected     Influenza B by PCR Not Detected     Mycoplasma pneumoniae by PCR Not Detected     Parainfluenza Virus 1 by PCR Not Detected     Parainfluenza Virus 2 by PCR Not Detected     Parainfluenza Virus 3 by PCR Not Detected     Parainfluenza Virus 4 by PCR Not Detected     Respiratory Syncytial Virus by PCR                  Culture, Blood 1 [[id number]] (Abnormal) Collected: 02/12/21 1546   Order Status: Completed Specimen: Blood Updated: 02/15/21 0633    Organism Staphylococcus coagulase-negativeAbnormal     Blood Culture, Routine --    Isolated from Aerobic bottle   No further workup   Refer to Blood Culture drawn on 2/12/21 at 16:15 for complete results    Narrative:     ORDER#: [id number]                          ORDERED BY: CELSA Marie   SOURCE: Blood Antecubital-Rig              COLLECTED:  02/12/21 15:46   ANTIBIOTICS AT [facility]. :                      RECEIVED :  02/12/21 15:55   CALL  [place] ,   Microbiology results called to and read back by Ron Sanches RN , 7th,   02/13/2021 12:56, by [facility]              IMAGING:                           RADIOLOGY      Ct Chest Wo Contrast    Result Date: 2/13/2021 EXAMINATION: CT CHEST WO CONTRAST 2/13/2021 2:17 PM HISTORY: Shortness of breath, bilateral pneumonia. DOSE: 976 mGycm. Automatic exposure control was utilized in an effort to use as little radiation as possible, without compromising image quality. REPORT: Spiral CT of the chest was performed without contrast, reconstructed coronal and sagittal images are also reviewed. COMPARISON: Portable chest x-ray to 12/20/2021. Review of lung windows demonstrates consolidative infiltrates in both lungs, on the right, this includes extensive infiltrate surrounding the right hilum, with contiguous infiltrate in the right upper lobe, with air bronchograms, there is also consolidation of the right lower lobe with air bronchograms and focal subpleural nodular infiltrates are identified in the right middle lobe and right upper lobe focally. In the left lung, there is a subpleural infiltrate in the left upper lobe along the major fissure, with mild paraseptal emphysematous changes. This is contiguous with mixed groundglass and consolidative infiltrates in the lingula and there is consolidation of the left lower lobe with air bronchograms, with associated bronchial wall thickening. A small amount of left perihilar infiltrate is also present. No pneumothorax is identified, there is a trace left pleural effusion, small right pleural effusion. No lung abscess or cavitation is seen in association with the areas of pneumonia. Soft tissue windows show a 12 mm low-attenuation lesion in the right thyroid lobe, which is most likely benign. There are normal-sized shotty appearing lymph nodes within the mediastinum without measurable lymphadenopathy. A small amount calcified plaques noted within the aortic arch and there is heavy calcified plaque within the coronary arteries. Heart size is normal. There is trace pericardial fluid. The visualized upper abdomen shows no acute abnormality. There is mild bilateral gynecomastia.  Review of bone windows shows advanced degenerative changes in the visualized upper lumbar spine. Consolidating infiltrates within both lungs, greatest in the right lower lobe and most compatible with bilateral pneumonia, there is a small right pleural effusion and trace left pleural effusion. No pneumothorax is identified. There is no lung abscess or areas of cavitation. Mild underlying changes of paraseptal emphysema are noted. No measurable intrathoracic lymphadenopathy is identified. Signed by Dr Sari Estes. Charl[place]imco on 2/13/2021 2:28 PM    Us Renal Complete    Result Date: 2/13/2021  EXAMINATION: US RENAL COMPLETE 2/13/2021 3:16 PM HISTORY: Acute kidney injury. Report: Sonographic images of the kidneys were obtained. COMPARISON: CT of the abdomen and pelvis with without contrast 7/31/2020. Ultrasound of the kidneys 10/23/2018. The right kidney measures 11.6 x 5.5 x 6.0 cm and has normal cortical echogenicity, with a cortical thickness that averages 12 mm. No solid mass is identified. There are 2 cysts in the right kidney appear benign, including one at the interpolar level measures 1.3 x 1.4 x 1.0 cm (previous measurement of 0.9 x 0.8 x 0.9 cm) and one at the inferior pole that measures 2.5 x 2.9 x 2.1 cm. This cyst previously measured 2.7 x 2.2 x 2.0 cm and is essentially unchanged Color Doppler images demonstrate vascular flow within the right kidney. The left kidney measures 10.4 x 4.5 x 4.0 cm and has normal morphology and cortical echogenicity without evidence of hydronephrosis or solid mass. There is a benign-appearing cyst at the inferior pole that measures 1 cm, not clearly seen on the previous ultrasound. Color Doppler images demonstrate vascular flow within the left kidney. Additional small cysts are seen in the kidneys on prior CT but poorly visualized on today's examination. The bladder is within normal limits. Bilateral renal cysts as detailed above, with a benign appearance. Some smaller cysts seen in the kidneys on previous CT are not visualized on today's examination No hydronephrosis is identified. Both kidneys have normal cortical echogenicity. The bladder is unremarkable. Signed by Dr Angel Ramsey. Holly on 2/13/2021 3:21 PM    Xr Chest Portable    Result Date: 2/12/2021  EXAMINATION: XR CHEST PORTABLE 2/12/2021 4:13 PM HISTORY: Shortness of breath, hypoxia, recent pneumonia COMPARISON: 1/18/2021 FINDINGS: The heart appears normal in size. Atherosclerotic calcifications are seen in the aorta. Bilateral airspace opacities are present with relative sparing of the upper lobes, markedly increased compared to the prior exam. There is no appreciable pneumothorax or definite pleural effusion. Bilateral pneumonia, though worse when compared to the prior exam. Follow-up PA and lateral chest radiograph to recommended in 6-8 weeks to document resolution.  Signed by Dr Eloina Suresh on 2/12/2021 4:14 PM                  Patient Active Problem List   Diagnosis    Diverticulitis of large intestine with perforation without bleeding    Generalized abdominal pain    Colonic diverticular abscess    Bilateral carotid artery stenosis    Atherosclerosis of native artery of both lower extremities with intermittent claudication ([address]    Carotid stenosis, asymptomatic, left    Primary osteoarthritis of left knee    Arthritis of knee    Essential hypertension    Pure hypercholesterolemia    Slow transit constipation    Iron deficiency anemia    GERD (gastroesophageal reflux disease)    Acute liver failure without hepatic coma    CHF (congestive heart failure) (HCC)    Bilateral pleural effusion    Palliative care patient    Shock liver    Acute renal failure ([address]    BPH associated with nocturia    Bleeding diathesis ([address]    Epistaxis    Acute blood loss anemia    Melena    Hypocalcemia  Pneumonia due to infectious organism    Bladder cancer [facility]    Postoperative pain    History of bladder cancer    Severe sepsis (HCC)    Acute on chronic respiratory failure (HCC)    Acute on chronic kidney failure (HCC)    Hypoxemia    Metabolic acidosis    Acidosis, metabolic, with respiratory acidosis    Acute respiratory failure (HCC)    Type 2 diabetes mellitus with complication, without long-term current use of insulin (HCC)    Weakness    Other dysphagia    Low back pain    Sepsis (HCC)       IMPRESSION:  Positive blood cultures - Staph epi in one bottle of one set and second set - drawn in ED with difficulty per patient - also one bottle of one set - referred to other ( ie not identified)- Suspect drawn from right antecubital iv site in ED - contaminant. Not consistent with pulmonary pathogen     Bilateral pneumonia right greater than left (patient had bilateral infiltrates right greater than left on chest x-ray during his January admission) last dose of methylprednisolone 2/17    Acute kidney injuryimproved and increased again Baseline? RECOMMENDATIONS :  Continue Mucinex  Home O2 eval?  Added random vancomycin to blood in lab from early thiis morning so we can try to extrapolate and see if his dose is still acceptable. Last vancomycin level was the 17th and his creatinine has increased  Continue cefepime  Hold afternoon vancomycin dose pending random vancomycin level. -Hopefully can get some information rather quickly and get orders in for [facility].       Jose Luis Parks MD

## 2021-02-23 NOTE — CARE COORDINATION
Unable to coordinate IV Abx dosing for delivery to the pt today 2/23. Will discuss first thing tomorrow AM 2/24 with Dr Louis Garcia who will be assigned as attending. Tyrone Harman with [facility] reports if orders are received by 10AM tomorrow morning should be able to deliver pt's med by tomorrow evenings second dose. It was suggested for pt's cefepime to be held until 8AM then again 8PM to be more [facility]. Pt's vancomycin can be administered as currently scheduled. RAFY discussed this with the HILARY Rivera who will also be CN tomorrow. RN/CM Donny Mcgovern entered nocturnal home O2 order to fax to [facility] as agreed by the pt. Juan Daniel Ph: [phone]  Fa: [phone]    RAFY has reviewed this with the pt.

## 2021-02-23 NOTE — CARE COORDINATION
Received consult for pt home IV Abx dosage and duration from [facility] and has faxed the referral to [facility] as the supplier. Awaiting approval, costs and delivery expectations for pt's dc.      [facility]  Ph: [phone]  Fa: [phone]  Weekend coverage Ph: [phone]

## 2021-02-23 NOTE — PROGRESS NOTES
Comprehensive Nutrition Assessment    Type and Reason for Visit:  Reassess    Nutrition Recommendations/Plan: continue current POC    Nutrition Assessment:  Pt remains well nourished and not at risk for nutritional compromise at this time. Pt ot happy about Cardiac diet restriction. \"I'm 78 y/o, I don't think watching my salt is going to add a nonominute to my life. \"  Aware liquids have been upgraded to regular liquids from [place] thick. Malnutrition Assessment:  Malnutrition Status:  No malnutrition    Context:  Acute Illness     Findings of the 6 clinical characteristics of malnutrition:  Energy Intake:  No significant decrease in energy intake  Weight Loss:  1 - 1% to 2% over 1 week     Body Fat Loss:  No significant body fat loss     Muscle Mass Loss:  No significant muscle mass loss    Fluid Accumulation:  1 - Mild Extremities   Strength:  Not Performed    Nutrition Related Findings:  well nourished      Wounds:  None       Current Nutrition Therapies:    DIET CARDIAC; Carb Control: 4 carb choices (60 gms)/meal    Anthropometric Measures:  · Height: 6' (182.9 cm)  · Current Body Weight: 238 lb (108 kg)   · Admission Body Weight: 235 lb (106.6 kg)    · Usual Body Weight: 229 lb (103.9 kg)(11/2020)     · Ideal Body Weight: 178 lbs; % Ideal Body Weight 133.7 %   · BMI: 32.3  · Adjusted Body Weight:  ; No Adjustment   · BMI Categories: Obese Class 1 (BMI 30.0-34. 9)       Nutrition Diagnosis:   · Altered nutrition-related lab values related to endocrine dysfuntion as evidenced by lab values      Nutrition Interventions:   Food and/or Nutrient Delivery:  Continue Current Diet  Nutrition Education/Counseling:  No recommendation at this time, Education declined   Coordination of Nutrition Care:  Continue to monitor while inpatient    Goals:  po intake 50% or greater for all meals.   Accuchek's under 200       Nutrition Monitoring and Evaluation:   Behavioral-Environmental Outcomes:  None Identified Food/Nutrient Intake Outcomes:  Food and Nutrient Intake  Physical Signs/Symptoms Outcomes:  Biochemical Data, Skin, Weight, Nutrition Focused Physical Findings     Discharge Planning:    Continue current diet     Electronically signed by Prema Gallegos MS, RD, LD on 2/23/21 at 10:53 AM CST    Contact: [phone]

## 2021-02-23 NOTE — PROGRESS NOTES
Pharmacy Vancomycin Consult     Vancomycin Day: 12  Current Dosin mg IV every 24 hours    Temp max:  97.8    Recent Labs     21  0543 21  0305   BUN 33* 32*       Recent Labs     21  0543 21  0305   CREATININE 1.4* 1.7*       Recent Labs     21  0543 21  0617   WBC 9.3 8.8         Intake/Output Summary (Last 24 hours) at 2021 1627  Last data filed at 2021 1624  Gross per 24 hour   Intake 400 ml   Output 2125 ml   Net -1725 ml       Culture Date Source Results   21 Blood Staph epidermidis   21 Blood No growth   21 Blood No growth       Ht Readings from Last 1 Encounters:   21 6' (1.829 m)        Wt Readings from Last 1 Encounters:   21 238 lb (108 kg)         Body mass index is 32.28 kg/m². Estimated Creatinine Clearance: 45 mL/min (A) (based on SCr of 1.7 mg/dL (H)). Random level: 22.7 (~11 hours after dose)    Assessment/Plan: Have discussed with Dr. Zaria Cuba. SCr increasing. Current half-life predicted to be about 40 hours currently. Will hold vancomycin for now. Random level ordered with tomorrow AM labs, ~36 hours after last dose. If level in range as predicted, will plan on giving vancomycin 1500 mg IV once to complete therapy.     Electronically signed by Fariba Waters, [address] [place] [address] on 2021 at 4:27 PM

## 2021-02-23 NOTE — PROGRESS NOTES
Spoke with Dr Kel Gaines regarding Dr Jimena Mckee note approving dc home with peripheral IV and IV antibiotics. Per Dr Kel Gaines, ok to dc home with peripheral IV. SW will have to set up IV antibiotics for home prior to dc (see order to follow for SW consult). Also discussed patient oxygen dropping last night (see order to follow for overnight pulse ox). Plan to dc home with home health tomorrow.    Electronically signed by Soumya Vigil RN on 2/22/2021 at 8:57 PM

## 2021-02-23 NOTE — PROGRESS NOTES
02/23/21 1037   Restrictions/Precautions   Restrictions/Precautions Fall Risk   Required Braces or Orthoses? No   General   Chart Reviewed Yes   Subjective   Subjective Patient in bed agrees to walk   Pain Screening   Patient Currently in Pain Denies   Intervention List Patient able to continue with treatment   Vital Signs   Level of Consciousness Alert (0)   Oxygen Therapy   O2 Device None (Room air)   Pre Treatment Pain Screening   Pain at present 0   Scale Used Numeric Score   Intervention List Patient able to continue with treatment   Bed Mobility   Supine to Sit Modified independent   Transfers   Sit to Stand Stand by assistance   Stand to sit Stand by assistance   Bed to Chair Stand by assistance   Ambulation   Ambulation? Yes   Ambulation 1   Surface level tile   Device Rolling Walker   Assistance Stand by assistance   Quality of Gait Flexed FWD.  but steady   Gait Deviations Slow Nae;Decreased step length   Distance 175'   Comments Patient in chair post gait   Activity Tolerance   Activity Tolerance Patient Tolerated treatment well   Safety Devices   Type of devices Left in chair;Chair alarm in place;Call light within reach   Physical Therapy    Electronically signed by Rocael Butts PTA on 2/23/2021 at 10:41 AM

## 2021-02-23 NOTE — PROGRESS NOTES
[facility]        Hospitalist Progress Note  2/22/2021 10:48 PM  Subjective:   Admit Date: 2/12/2021  PCP: Norbert Sandoval MD    Chief Complaint: Dyspnea    Subjective: Patient is looking forward to go home. Feeling a little better. Cumulative Hospital History: 76-year-old obese male with a past medical history of COPD not on home oxygen, CKD stage III, diabetes, hypertension, BPH, h/o bladder CA recently admitted for bilateral pneumonia requiring intubation and mechanical intubation discharged to inpatient rehab (by me) with improvement and eventual discharge home. Patient presents back to the ED with worsening dyspnea found to have worsening bilateral pneumonia on CXR, poor PO intake and associated ANA on CKD and elevated troponin. Cardiology consulted for elevated troponin. Troponin trending down - needs improvement in pulmonary status prior to stress testing or intervention from cardiology - plans for stress test as an outpatient. Noted to have 2 of 2 bottles staph bacteremia and infectious disease consulted. Patient continues on broad spectrum antibiotics with likely need for 14 days of IV antibiotics. Constipation resolved with management. ID have put down the recommendations for DC antibiotics which the case management is working on. ROS: 14 point review of systems is negative except as specifically addressed above.     DIET CARDIAC; Carb Control: 4 carb choices (60 gms)/meal    Intake/Output Summary (Last 24 hours) at 2/22/2021 2248  Last data filed at 2/22/2021 1638  Gross per 24 hour   Intake 360 ml   Output 1025 ml   Net -665 ml     Medications:   sodium chloride 100 mL/hr at 02/17/21 0540    dextrose       Current Facility-Administered Medications   Medication Dose Route Frequency Provider Last Rate Last Admin    calcium carbonate (TUMS) chewable tablet 500 mg  500 mg Oral TID PRN Ian Conti MD   500 mg at 02/22/21 0461  famotidine (PEPCID) tablet 40 mg  40 mg Oral Daily Antonio Perez MD   40 mg at 02/22/21 1909    lactulose ([address] [facility] [place]) 10 GM/15ML solution 10 g  10 g Oral TID Cris Chung MD   10 g at 02/21/21 1339    vancomycin (VANCOCIN) 1000 mg in dextrose 5% 250 mL IVPB  1,000 mg Intravenous Q24H Cris Chung MD   Stopped at 02/22/21 1704    doxycycline hyclate (VIBRAMYCIN) capsule 100 mg  100 mg Oral 2 times per day Allen Díaz MD   100 mg at 02/22/21 0940    hydrALAZINE (APRESOLINE) tablet 25 mg  25 mg Oral 3 times per day Cris Chung MD   25 mg at 02/22/21 1603    technetium sestamibi (CARDIOLITE) injection 10 millicurie  10 millicurie Intravenous ONCE PRN Aranza Calvo MD        technetium sestamibi (CARDIOLITE) injection 30 millicurie  30 millicurie Intravenous ONCE PRN Aranza Calvo MD        guaiFENesin (ROBITUSSIN) 100 MG/5ML syrup 200 mg  200 mg Oral Q4H PRN Cris Chung MD        Benzocaine-Menthol (CEPACOL) 1 lozenge  1 lozenge Oral Q2H PRN Cris Chung MD        phenol 1.4 % mouth spray 1 spray  1 spray Mouth/Throat Q2H PRN Cris Chung MD        docusate sodium (COLACE) capsule 100 mg  100 mg Oral Daily Cris Chung MD   100 mg at 02/22/21 0940    senna (SENOKOT) tablet 8.6 mg  1 tablet Oral Nightly Cris Chung MD   8.6 mg at 02/21/21 2307    bisacodyl (DULCOLAX) suppository 10 mg  10 mg Rectal Daily PRN Cris Chung MD        guaiFENesin [facility]) extended release tablet 1,200 mg  1,200 mg Oral BID Allen Díaz MD   1,200 mg at 02/22/21 0941    budesonide (PULMICORT) nebulizer suspension 500 mcg  0.5 mg Nebulization BID Cris Chung MD   500 mcg at 02/22/21 1841    Arformoterol Tartrate (BROVANA) nebulizer solution 15 mcg  15 mcg Nebulization BID Cris Chung MD   15 mcg at 02/22/21 1841    0.9 % sodium chloride infusion   Intravenous Continuous Cris Chung  mL/hr at 02/17/21 0540 New Bag at 02/17/21 0540  ceFEPIme (MAXIPIME) 2,000 mg in sterile water 20 mL IV syringe  2,000 mg Intravenous Q12H Shelby Stanley MD   2,000 mg at 02/22/21 0533    vancomycin (VANCOCIN) intermittent dosing (placeholder)   Other RX Placeholder Shelby Stanley MD        aspirin EC tablet 81 mg  81 mg Oral Daily Shelby Stanley MD   81 mg at 02/22/21 0941    atorvastatin (LIPITOR) tablet 20 mg  20 mg Oral Daily Shelby Stanley MD   20 mg at 02/22/21 0941    metoprolol succinate (TOPROL XL) extended release tablet 100 mg  100 mg Oral Daily Shelby Stanley MD   100 mg at 02/22/21 0940    NIFEdipine (PROCARDIA XL) extended release tablet 60 mg  60 mg Oral Daily Shelby Stanley MD   60 mg at 02/22/21 0941    pantoprazole (PROTONIX) tablet 40 mg  40 mg Oral Daily Shelby Stanley MD   40 mg at 02/22/21 0941    tamsulosin (FLOMAX) capsule 0.4 mg  0.4 mg Oral Daily Shelby Stanley MD   0.4 mg at 02/22/21 0941    trospium (SANCTURA) tablet 20 mg  20 mg Oral Nightly Shelby Stanley MD   20 mg at 02/21/21 2307    sodium chloride flush 0.9 % injection 10 mL  10 mL Intravenous 2 times per day Shelby Stanley MD   10 mL at 02/22/21 0940    sodium chloride flush 0.9 % injection 10 mL  10 mL Intravenous PRN Shelby Stanley MD   10 mL at 02/18/21 1647    enoxaparin (LOVENOX) injection 40 mg  40 mg Subcutaneous Q24H Shelby Stanley MD   40 mg at 02/21/21 2309    polyethylene glycol (GLYCOLAX) packet 17 g  17 g Oral Daily PRN Shelby Stanley MD   17 g at 02/14/21 2233    acetaminophen (TYLENOL) tablet 650 mg  650 mg Oral Q6H PRN Shelby Stanley MD        Or    acetaminophen (TYLENOL) suppository 650 mg  650 mg Rectal Q6H PRN Shelby Stanley MD        insulin lispro (HUMALOG) injection vial 0-6 Units  0-6 Units Subcutaneous TID WC Shelby Stanley MD   1 Units at 02/22/21 1304    insulin lispro (HUMALOG) injection vial 0-3 Units  0-3 Units Subcutaneous Nightly Shelby Stanley MD   1 Units at 02/19/21 3188  ipratropium-albuterol (DUONEB) nebulizer solution 1 ampule  1 ampule Inhalation Q4H Jocy Thomas MD   1 ampule at 02/22/21 2201    glucose (GLUTOSE) 40 % oral gel 15 g  15 g Oral PRN Jocy Thomas MD        dextrose 50 % IV solution  12.5 g Intravenous PRN Jocy Thomas MD        glucagon (rDNA) injection 1 mg  1 mg Intramuscular PRN Jocy Thomas MD        dextrose 5 % solution  100 mL/hr Intravenous PRN Jocy Thomas MD            Labs:     Recent Labs     02/20/21 0253 02/21/21 0218 02/22/21  0543   WBC 9.0 10.6 9.3   RBC 4.44* 4.20* 4.14*   HGB 12.4* 11.8* 11.5*   HCT 39.1* 37.4* 35.8*   MCV 88.1 89.0 86.5   MCH 27.9 28.1 27.8   MCHC 31.7* 31.6* 32.1*    160 135     Recent Labs     02/20/21 0253 02/21/21 0218 02/22/21  0543    138 136   K 3.7 3.8 3.4*   ANIONGAP 9 11 11    103 103   CO2 26 24 22   BUN 30* 32* 33*   CREATININE 1.3* 1.4* 1.4*   GLUCOSE 91 127* 122*   CALCIUM 8.2* 8.1* 8.0*     Recent Labs     02/20/21 0253 02/21/21 0218   MG 2.1 1.9     Recent Labs     02/20/21 0253 02/21/21 0218 02/22/21  0543   AST 25 22 18   ALT 39 37 33   BILITOT 0.4 0.4 0.6   ALKPHOS 83 95 107     ABGs:No results for input(s): PH, PO2, PCO2, HCO3, BE, O2SAT in the last 72 hours. Troponin T:   Recent Labs     02/22/21  0543   TROPONINI 0.03     INR: No results for input(s): INR in the last 72 hours. Lactic Acid:   No results for input(s): LACTA in the last 72 hours.     Objective:   Vitals: /74   Pulse 86   Temp 98 °F (36.7 °C)   Resp 16   Ht 6' (1.829 m)   Wt 238 lb (108 kg)   SpO2 92%   BMI 32.28 kg/m²   24HR INTAKE/OUTPUT:      Intake/Output Summary (Last 24 hours) at 2/22/2021 2248  Last data filed at 2/22/2021 1638  Gross per 24 hour   Intake 360 ml   Output 1025 ml   Net -665 ml     General appearance: alert and cooperative with exam  HEENT: AT/NC  Lungs: BLAE  Heart: RRR  Abdomen: BS+, soft, NT, ND  Extremities: pulses+  Neurologic: Alert, gross motor function intact Skin: warm    Assessment and Plan: Active Problems:    Sepsis ([address]  Resolved Problems:    * No resolved hospital problems. *    Pneumonia/COPD Exacerbation: Broad spectrum antibiotics - Day 11 Vancomycin/Cefepime. Day 10 Doxycycline. Bronchodilators. Wean O2 as tolerated. Overnight pulse oximetry ordered to evaluate for home O2 requirements. Leukocytosis: Resolved. Constipation: Resolved. Dizziness: Resolved. CT Head unremarkable. Elevated troponin: Cardiology has seen during admission. Further workup including likely stress test as outpatient. Repeat troponin in a.m. ANA/CKD III: Monitor BMP. DM: HbA1c 6.2. Monitor BG and adjust medications PRN. Supportive management. PT/OT/SLP. Advance Directive: Full Code    DVT prophylaxis: Lovenox    Discharge planning: PT/OT evaluation ordered. DC planning likely to skilled nursing facility in the next couple of days.       Perez Fraser MD 2/22/2021 10:48 PM  Rounding Hospitalist

## 2021-02-23 NOTE — CARE COORDINATION
Pt's attending Dr Srinivasa Ying requested if pt meds could be delivered for tomorrow morning dosage the pt could possibly dc today following his 2nd dose of cefepime. Informed by Ninfa Arroyo with [facility] the pt copay is $124 for meds and supplies and to contact the pt to discuss. Ninfa Arroyo requested H&P and ID orders for abx that includes dosage. Ninfa Arroyo agrees if revised order is received by 1pm today the meds could be delivered in time for pt's AM dose tomorrow 2/24. SW requested revised order from ID Dr Axel Rhodes. SW confirmed with [facility] RN Rabon Boeck the pt's [facility] referral entered by attending Dr Srinivasa Ying is appropriate RYAN and pt can be seen in the AM for first home dosage of IV Abx.

## 2021-02-24 VITALS
HEART RATE: 71 BPM | WEIGHT: 238 LBS | OXYGEN SATURATION: 91 % | DIASTOLIC BLOOD PRESSURE: 71 MMHG | TEMPERATURE: 98 F | BODY MASS INDEX: 32.23 KG/M2 | SYSTOLIC BLOOD PRESSURE: 119 MMHG | RESPIRATION RATE: 18 BRPM | HEIGHT: 72 IN

## 2021-02-24 PROBLEM — J44.0 COPD WITH ACUTE LOWER RESPIRATORY INFECTION (HCC): Status: ACTIVE | Noted: 2021-02-24

## 2021-02-24 PROBLEM — N18.30 CKD (CHRONIC KIDNEY DISEASE) STAGE 3, GFR 30-59 ML/MIN (HCC): Status: ACTIVE | Noted: 2021-02-24

## 2021-02-24 PROBLEM — A41.9 SEPSIS (HCC): Status: RESOLVED | Noted: 2021-02-12 | Resolved: 2021-02-24

## 2021-02-24 LAB
ALBUMIN SERPL-MCNC: 2.7 G/DL (ref 3.5–5.2)
ALP BLD-CCNC: 127 U/L (ref 40–130)
ALT SERPL-CCNC: 29 U/L (ref 5–41)
ANION GAP SERPL CALCULATED.3IONS-SCNC: 10 MMOL/L (ref 7–19)
AST SERPL-CCNC: 19 U/L (ref 5–40)
BASOPHILS ABSOLUTE: 0 K/UL (ref 0–0.2)
BASOPHILS RELATIVE PERCENT: 0.1 % (ref 0–1)
BILIRUB SERPL-MCNC: 0.5 MG/DL (ref 0.2–1.2)
BUN BLDV-MCNC: 33 MG/DL (ref 8–23)
CALCIUM SERPL-MCNC: 8.1 MG/DL (ref 8.8–10.2)
CHLORIDE BLD-SCNC: 102 MMOL/L (ref 98–111)
CO2: 22 MMOL/L (ref 22–29)
CREAT SERPL-MCNC: 1.6 MG/DL (ref 0.5–1.2)
EOSINOPHILS ABSOLUTE: 0 K/UL (ref 0–0.6)
EOSINOPHILS RELATIVE PERCENT: 0 % (ref 0–5)
GFR AFRICAN AMERICAN: 51
GFR NON-AFRICAN AMERICAN: 42
GLUCOSE BLD-MCNC: 113 MG/DL (ref 74–109)
GLUCOSE BLD-MCNC: 143 MG/DL (ref 70–99)
HCT VFR BLD CALC: 32.6 % (ref 42–52)
HEMOGLOBIN: 10.6 G/DL (ref 14–18)
IMMATURE GRANULOCYTES #: 0.1 K/UL
LYMPHOCYTES ABSOLUTE: 0.8 K/UL (ref 1.1–4.5)
LYMPHOCYTES RELATIVE PERCENT: 9.9 % (ref 20–40)
MCH RBC QN AUTO: 28.2 PG (ref 27–31)
MCHC RBC AUTO-ENTMCNC: 32.5 G/DL (ref 33–37)
MCV RBC AUTO: 86.7 FL (ref 80–94)
MONOCYTES ABSOLUTE: 0.9 K/UL (ref 0–0.9)
MONOCYTES RELATIVE PERCENT: 11.3 % (ref 0–10)
NEUTROPHILS ABSOLUTE: 6.2 K/UL (ref 1.5–7.5)
NEUTROPHILS RELATIVE PERCENT: 78.1 % (ref 50–65)
PDW BLD-RTO: 14 % (ref 11.5–14.5)
PERFORMED ON: ABNORMAL
PLATELET # BLD: 119 K/UL (ref 130–400)
PMV BLD AUTO: 12.7 FL (ref 9.4–12.4)
POTASSIUM SERPL-SCNC: 3.5 MMOL/L (ref 3.5–5)
RBC # BLD: 3.76 M/UL (ref 4.7–6.1)
SODIUM BLD-SCNC: 134 MMOL/L (ref 136–145)
TOTAL PROTEIN: 4.6 G/DL (ref 6.6–8.7)
VANCOMYCIN RANDOM: 17.1 UG/ML
WBC # BLD: 7.9 K/UL (ref 4.8–10.8)

## 2021-02-24 PROCEDURE — 94640 AIRWAY INHALATION TREATMENT: CPT

## 2021-02-24 PROCEDURE — 80202 ASSAY OF VANCOMYCIN: CPT

## 2021-02-24 PROCEDURE — 36415 COLL VENOUS BLD VENIPUNCTURE: CPT

## 2021-02-24 PROCEDURE — 82947 ASSAY GLUCOSE BLOOD QUANT: CPT

## 2021-02-24 PROCEDURE — 6370000000 HC RX 637 (ALT 250 FOR IP): Performed by: INTERNAL MEDICINE

## 2021-02-24 PROCEDURE — 80053 COMPREHEN METABOLIC PANEL: CPT

## 2021-02-24 PROCEDURE — 85025 COMPLETE CBC W/AUTO DIFF WBC: CPT

## 2021-02-24 PROCEDURE — 6360000002 HC RX W HCPCS: Performed by: INTERNAL MEDICINE

## 2021-02-24 PROCEDURE — 6370000000 HC RX 637 (ALT 250 FOR IP): Performed by: HOSPITALIST

## 2021-02-24 PROCEDURE — 2580000003 HC RX 258: Performed by: INTERNAL MEDICINE

## 2021-02-24 RX ORDER — GUAIFENESIN 100 MG/5ML
200 SYRUP ORAL EVERY 4 HOURS PRN
Qty: 118 ML | Refills: 0 | Status: ON HOLD | OUTPATIENT
Start: 2021-02-24 | End: 2021-03-29 | Stop reason: SDUPTHER

## 2021-02-24 RX ORDER — HYDRALAZINE HYDROCHLORIDE 25 MG/1
25 TABLET, FILM COATED ORAL EVERY 8 HOURS SCHEDULED
Qty: 90 TABLET | Refills: 0 | Status: ON HOLD | OUTPATIENT
Start: 2021-02-24 | End: 2021-03-15 | Stop reason: HOSPADM

## 2021-02-24 RX ORDER — FAMOTIDINE 20 MG/1
20 TABLET, FILM COATED ORAL DAILY
Qty: 60 TABLET | Refills: 0 | Status: ON HOLD | OUTPATIENT
Start: 2021-02-24 | End: 2021-03-29 | Stop reason: HOSPADM

## 2021-02-24 RX ORDER — CALCIUM CARBONATE 200(500)MG
500 TABLET,CHEWABLE ORAL 3 TIMES DAILY PRN
Qty: 60 TABLET | Refills: 0 | Status: ON HOLD | COMMUNITY
Start: 2021-02-24 | End: 2021-03-29

## 2021-02-24 RX ADMIN — IPRATROPIUM BROMIDE AND ALBUTEROL SULFATE 1 AMPULE: 2.5; .5 SOLUTION RESPIRATORY (INHALATION) at 02:11

## 2021-02-24 RX ADMIN — ASPIRIN 81 MG: 81 TABLET, COATED ORAL at 09:38

## 2021-02-24 RX ADMIN — DOXYCYCLINE HYCLATE 100 MG: 100 CAPSULE ORAL at 09:38

## 2021-02-24 RX ADMIN — ATORVASTATIN CALCIUM 20 MG: 20 TABLET, FILM COATED ORAL at 09:38

## 2021-02-24 RX ADMIN — CEFEPIME HYDROCHLORIDE 2000 MG: 2 INJECTION, POWDER, FOR SOLUTION INTRAVENOUS at 09:37

## 2021-02-24 RX ADMIN — ARFORMOTEROL TARTRATE 15 MCG: 15 SOLUTION RESPIRATORY (INHALATION) at 06:21

## 2021-02-24 RX ADMIN — PANTOPRAZOLE SODIUM 40 MG: 40 TABLET, DELAYED RELEASE ORAL at 09:38

## 2021-02-24 RX ADMIN — VANCOMYCIN HYDROCHLORIDE 1500 MG: 10 INJECTION, POWDER, LYOPHILIZED, FOR SOLUTION INTRAVENOUS at 06:04

## 2021-02-24 RX ADMIN — METOPROLOL SUCCINATE 100 MG: 50 TABLET, EXTENDED RELEASE ORAL at 09:38

## 2021-02-24 RX ADMIN — IPRATROPIUM BROMIDE AND ALBUTEROL SULFATE 1 AMPULE: 2.5; .5 SOLUTION RESPIRATORY (INHALATION) at 10:29

## 2021-02-24 RX ADMIN — GUAIFENESIN 1200 MG: 600 TABLET, EXTENDED RELEASE ORAL at 09:38

## 2021-02-24 RX ADMIN — FAMOTIDINE 20 MG: 20 TABLET, FILM COATED ORAL at 09:38

## 2021-02-24 RX ADMIN — HYDRALAZINE HYDROCHLORIDE 25 MG: 25 TABLET, FILM COATED ORAL at 06:04

## 2021-02-24 RX ADMIN — SODIUM CHLORIDE, PRESERVATIVE FREE 10 ML: 5 INJECTION INTRAVENOUS at 09:39

## 2021-02-24 RX ADMIN — TAMSULOSIN HYDROCHLORIDE 0.4 MG: 0.4 CAPSULE ORAL at 09:38

## 2021-02-24 RX ADMIN — NIFEDIPINE 60 MG: 60 TABLET, FILM COATED, EXTENDED RELEASE ORAL at 09:38

## 2021-02-24 RX ADMIN — BUDESONIDE 500 MCG: 0.5 INHALANT RESPIRATORY (INHALATION) at 06:21

## 2021-02-24 RX ADMIN — DOCUSATE SODIUM 100 MG: 100 CAPSULE, LIQUID FILLED ORAL at 09:38

## 2021-02-24 RX ADMIN — IPRATROPIUM BROMIDE AND ALBUTEROL SULFATE 1 AMPULE: 2.5; .5 SOLUTION RESPIRATORY (INHALATION) at 06:21

## 2021-02-24 NOTE — PROGRESS NOTES
[facility]        Hospitalist Progress Note  2/23/2021 11:27 PM  Subjective:   Admit Date: 2/12/2021  PCP: Cooper Moncada MD    Chief Complaint: Dyspnea    Subjective: Patient is continuing to improve. Wanting to go home soon. Cumulative Hospital History: 23-year-old obese male with a past medical history of COPD not on home oxygen, CKD stage III, diabetes, hypertension, BPH, h/o bladder CA recently admitted for bilateral pneumonia requiring intubation and mechanical intubation discharged to inpatient rehab (by me) with improvement and eventual discharge home. Patient presents back to the ED with worsening dyspnea found to have worsening bilateral pneumonia on CXR, poor PO intake and associated ANA on CKD and elevated troponin. Cardiology consulted for elevated troponin. Troponin trending down - needs improvement in pulmonary status prior to stress testing or intervention from cardiology - plans for stress test as an outpatient. Noted to have 2 of 2 bottles staph bacteremia and infectious disease consulted. Patient continues on broad spectrum antibiotics with likely need for 14 days of IV antibiotics. Constipation resolved with management. ID have put down the recommendations for DC antibiotics which the case management is working on. Likely discharge in the morning home with home health care and the antibiotics as per ID recommendations below: Patient would require Home O2 evaluation prior to discharge.       ID ANTIBIOTIC RECOMMENDATIONS . .. 2/22/21:  Continue Mucinex  Wean O2 as tolerated  Noted attending note yesterday talking about skilled nursing facility. Patient mentioned to me today that he thought he was going home. Would continue vancomycin and cefepime for 14 daystoday is day #10.  continue doxycycline to p.o. for total of 14 days as well. If plans are for discharge home, can place orders for completing antibiotic therapy at home. His wife is a retired nurse per the patient. He would likely just need a good functioning IV and not a midline for just 4 more days.     Mention to social work/case management in the hallway earlier that patient was not interested in going to a skilled nursing facility.     DAQUAN DUFFY MD                   ROS: 14 point review of systems is negative except as specifically addressed above.     DIET CARDIAC; Carb Control: 4 carb choices (60 gms)/meal    Intake/Output Summary (Last 24 hours) at 2/23/2021 2327  Last data filed at 2/23/2021 2037  Gross per 24 hour   Intake 750 ml   Output 1875 ml   Net -1125 ml     Medications:   sodium chloride 100 mL/hr at 02/17/21 0540    dextrose       Current Facility-Administered Medications   Medication Dose Route Frequency Provider Last Rate Last Admin    [START ON 2/24/2021] famotidine (PEPCID) tablet 20 mg  20 mg Oral Daily Antonio Perez MD        calcium carbonate (TUMS) chewable tablet 500 mg  500 mg Oral TID PRN Tasia Miller MD   500 mg at 02/22/21 0257    lactulose (CHRONULAC) 10 GM/15ML solution 10 g  10 g Oral TID Elijah Solano MD   10 g at 02/23/21 1439    doxycycline hyclate (VIBRAMYCIN) capsule 100 mg  100 mg Oral 2 times per day James Eli MD   100 mg at 02/23/21 2024    hydrALAZINE (APRESOLINE) tablet 25 mg  25 mg Oral 3 times per day Elijah Solano MD   25 mg at 02/23/21 2024    technetium sestamibi (CARDIOLITE) injection 10 millicurie  10 millicurie Intravenous ONCE PRN Judith Greenwood MD        technetium sestamibi (CARDIOLITE) injection 30 millicurie  30 millicurie Intravenous ONCE PRN Judith Greenwood MD        guaiFENesin (ROBITUSSIN) 100 MG/5ML syrup 200 mg  200 mg Oral Q4H PRN Elijah Solano MD        Benzocaine-Menthol (CEPACOL) 1 lozenge  1 lozenge Oral Q2H PRN Elijah Solano MD  phenol 1.4 % mouth spray 1 spray  1 spray Mouth/Throat Q2H PRN Cornelia Gardner MD        docusate sodium (COLACE) capsule 100 mg  100 mg Oral Daily Cornelia Gardner MD   100 mg at 02/23/21 0849    senna (SENOKOT) tablet 8.6 mg  1 tablet Oral Nightly Cornelia Gardner MD   8.6 mg at 02/23/21 2024    bisacodyl (DULCOLAX) suppository 10 mg  10 mg Rectal Daily PRN Cornelia Gardner MD        guaiFENesin [facility]) extended release tablet 1,200 mg  1,200 mg Oral BID Bryce Khan MD   1,200 mg at 02/23/21 2024    budesonide (PULMICORT) nebulizer suspension 500 mcg  0.5 mg Nebulization BID Cornelia Gardner MD   500 mcg at 02/23/21 1828    Arformoterol Tartrate (BROVANA) nebulizer solution 15 mcg  15 mcg Nebulization BID Cornelia Gardner MD   15 mcg at 02/23/21 1828    0.9 % sodium chloride infusion   Intravenous Continuous Cornelia Gardner  mL/hr at 02/17/21 0540 New Bag at 02/17/21 0540    ceFEPIme (MAXIPIME) 2,000 mg in sterile water 20 mL IV syringe  2,000 mg Intravenous Q12H Colby Wing MD   2,000 mg at 02/23/21 1809    vancomycin (VANCOCIN) intermittent dosing (placeholder)   Other RX Placeholder Colby Wing MD        aspirin EC tablet 81 mg  81 mg Oral Daily Colby Wing MD   81 mg at 02/23/21 0849    atorvastatin (LIPITOR) tablet 20 mg  20 mg Oral Daily Colby Wing MD   20 mg at 02/23/21 0849    metoprolol succinate (TOPROL XL) extended release tablet 100 mg  100 mg Oral Daily Colby Wing MD   100 mg at 02/23/21 0850    NIFEdipine (PROCARDIA XL) extended release tablet 60 mg  60 mg Oral Daily Colby Wing MD   60 mg at 02/23/21 0849    pantoprazole (PROTONIX) tablet 40 mg  40 mg Oral Daily Colby Wing MD   40 mg at 02/23/21 0849    tamsulosin (FLOMAX) capsule 0.4 mg  0.4 mg Oral Daily Colby Wing MD   0.4 mg at 02/23/21 0849    trospium (SANCTURA) tablet 20 mg  20 mg Oral Nightly Colby Wing MD   20 mg at 02/23/21 2024  sodium chloride flush 0.9 % injection 10 mL  10 mL Intravenous 2 times per day Talia Gambino MD   10 mL at 02/23/21 2025    sodium chloride flush 0.9 % injection 10 mL  10 mL Intravenous PRN Talia Gambino MD   10 mL at 02/18/21 1647    enoxaparin (LOVENOX) injection 40 mg  40 mg Subcutaneous Q24H Talia Gambino MD   40 mg at 02/23/21 2024    polyethylene glycol (GLYCOLAX) packet 17 g  17 g Oral Daily PRN Talia Gambino MD   17 g at 02/14/21 2233    acetaminophen (TYLENOL) tablet 650 mg  650 mg Oral Q6H PRN Talia Gambino MD        Or    acetaminophen (TYLENOL) suppository 650 mg  650 mg Rectal Q6H PRN Talia Gambino MD        insulin lispro (HUMALOG) injection vial 0-6 Units  0-6 Units Subcutaneous TID WC Talia Gambino MD   1 Units at 02/23/21 1811    insulin lispro (HUMALOG) injection vial 0-3 Units  0-3 Units Subcutaneous Nightly Talia Gambino MD   1 Units at 02/22/21 2259    ipratropium-albuterol (DUONEB) nebulizer solution 1 ampule  1 ampule Inhalation Q4H Talia Gambino MD   1 ampule at 02/23/21 2210    glucose (GLUTOSE) 40 % oral gel 15 g  15 g Oral PRN Talia Gambino MD        dextrose 50 % IV solution  12.5 g Intravenous PRN Talia Gambino MD        glucagon (rDNA) injection 1 mg  1 mg Intramuscular PRN Talia Gambino MD        dextrose 5 % solution  100 mL/hr Intravenous MIRNA Gambino MD            Labs:     Recent Labs     02/21/21 0218 02/22/21  0543 02/23/21  0617   WBC 10.6 9.3 8.8   RBC 4.20* 4.14* 3.92*   HGB 11.8* 11.5* 11.0*   HCT 37.4* 35.8* 34.2*   MCV 89.0 86.5 87.2   MCH 28.1 27.8 28.1   MCHC 31.6* 32.1* 32.2*    135 120*     Recent Labs     02/21/21  0218 02/22/21  0543 02/23/21  0305    136 133*   K 3.8 3.4* 3.7   ANIONGAP 11 11 11    103 101   CO2 24 22 21*   BUN 32* 33* 32*   CREATININE 1.4* 1.4* 1.7*   GLUCOSE 127* 122* 117*   CALCIUM 8.1* 8.0* 8.0*     Recent Labs     02/21/21 0218   MG 1.9     Recent Labs     02/21/21  0218 02/22/21 0543 02/23/21  0305   AST 22 18 19   ALT 37 33 28   BILITOT 0.4 0.6 0.5   ALKPHOS 95 107 115     ABGs:No results for input(s): PH, PO2, PCO2, HCO3, BE, O2SAT in the last 72 hours. Troponin T:   Recent Labs     02/22/21  0543   TROPONINI 0.03     INR: No results for input(s): INR in the last 72 hours. Lactic Acid:   No results for input(s): LACTA in the last 72 hours. Objective:   Vitals: /67   Pulse 72   Temp 99.1 °F (37.3 °C) (Temporal)   Resp 18   Ht 6' (1.829 m)   Wt 238 lb (108 kg)   SpO2 94%   BMI 32.28 kg/m²   24HR INTAKE/OUTPUT:      Intake/Output Summary (Last 24 hours) at 2/23/2021 2327  Last data filed at 2/23/2021 2037  Gross per 24 hour   Intake 750 ml   Output 1875 ml   Net -1125 ml     General appearance: alert and cooperative with exam  HEENT: AT/NC  Lungs: BLAE  Heart: RRR  Abdomen: BS+, soft, NT, ND  Extremities: pulses+  Neurologic: Alert, gross motor function intact  Skin: warm    Assessment and Plan: Active Problems:    Sepsis ([address]  Resolved Problems:    * No resolved hospital problems. *    Pneumonia/COPD Exacerbation: Broad spectrum antibiotics - Day 12 Vancomycin/Cefepime. Day 11 Doxycycline. Bronchodilators. Wean O2 as tolerated. Overnight pulse oximetry ordered to evaluate for home O2 requirements. Patient will need home O2 eval in a.m. Leukocytosis: Resolved. Constipation: Resolved. Dizziness: Resolved. CT Head unremarkable. Elevated troponin: Cardiology has seen during admission. Further workup including likely stress test as outpatient. Repeat troponin in a.m. ANA/CKD III: Monitor BMP. DM: HbA1c 6.2. Monitor BG and adjust medications PRN. Supportive management. PT/OT/SLP. Advance Directive: Full Code    DVT prophylaxis: Lovenox    Discharge planning: PT/OT evaluation ordered. DC planning home with home care in a.m. after arranged IV antibiotics by case management.       Andrew Akers MD 2/23/2021 11:27 PM  Rounding Hospitalist

## 2021-02-24 NOTE — PROGRESS NOTES
Pharmacy Vancomycin Consult     Vancomycin Day: 13  Current Dosing:     Temp max:  99.1    Recent Labs     02/22/21  0543 02/23/21  0305   BUN 33* 32*       Recent Labs     02/22/21  0543 02/23/21  0305   CREATININE 1.4* 1.7*       Recent Labs     02/22/21  0543 02/23/21  0617   WBC 9.3 8.8         Intake/Output Summary (Last 24 hours) at 2/24/2021 0551  Last data filed at 2/24/2021 0527  Gross per 24 hour   Intake 1000 ml   Output 1925 ml   Net -925 ml       Culture Date Source Results   02/14/21 Blood No growth   02/12/21 Blood Staph coag-  Staph epidermidis            Ht Readings from Last 1 Encounters:   02/23/21 6' (1.829 m)        Wt Readings from Last 1 Encounters:   02/21/21 238 lb (108 kg)         Body mass index is 32.28 kg/m². Estimated Creatinine Clearance: 45 mL/min (A) (based on SCr of 1.7 mg/dL (H)). Trough: 17.1    Assessment/Plan: Give 1500mg x1 dose to finish course of treatment.     Electronically signed by Davie Reardon, [address] [place] [address] on 2/24/2021 at 5:51 AM

## 2021-02-24 NOTE — CARE COORDINATION
Faxed order from Dr Aishwarya Braun of cefepime and DC summary from attending Dr Duane Hoyle showing dosing for vancomycin. Awaiting confirmation from [facility] for meds delivery. Per pt's RN pt received vanc this AM early and has completed. Is expected to receive first dose of cefepime prior to dc and second dose at home. [facility] services are to resume and able to be at pt's home for first administer this evening. Pt has home nocturnal O2 established through [facility] for dc as well. Attending Dr Duane Hoyle provided written scripts for meds and SW faxed to [facility].

## 2021-02-24 NOTE — DISCHARGE SUMMARY
Benson Amaro  :  1940  MRN:  [id number]    Admit date:  2021  Discharge date:      Admitting Physician:  No admitting provider for patient encounter. Advance Directive: Full Code    Consults: cardiology and ID    Primary Care Physician:  Melanie Whittington MD    Discharge Diagnoses:  Principal Problem (Resolved):    Sepsis ([facility] [address]  Active Problems:    Iron deficiency anemia    Acute kidney injury ([facility] [address]    Elevated troponin    Pneumonia due to infectious organism    Type 2 diabetes mellitus with complication, without long-term current use of insulin ([facility] [address]    COPD with acute lower respiratory infection ([facility] [address]    CKD (chronic kidney disease) stage 3, GFR 30-59 ml/min      Significant Diagnostic Studies:   Ct Chest Wo Contrast    Result Date: 2021  EXAMINATION: CT CHEST WO CONTRAST 2021 2:17 PM HISTORY: Shortness of breath, bilateral pneumonia. DOSE: 976 mGycm. Automatic exposure control was utilized in an effort to use as little radiation as possible, without compromising image quality. REPORT: Spiral CT of the chest was performed without contrast, reconstructed coronal and sagittal images are also reviewed. COMPARISON: Portable chest x-ray to 2021. Review of lung windows demonstrates consolidative infiltrates in both lungs, on the right, this includes extensive infiltrate surrounding the right hilum, with contiguous infiltrate in the right upper lobe, with air bronchograms, there is also consolidation of the right lower lobe with air bronchograms and focal subpleural nodular infiltrates are identified in the right middle lobe and right upper lobe focally. In the left lung, there is a subpleural infiltrate in the left upper lobe along the major fissure, with mild paraseptal emphysematous changes.  This is contiguous with mixed groundglass and consolidative infiltrates in the lingula and there is consolidation of the left lower lobe with air bronchograms, with associated bronchial wall thickening. A small amount of left perihilar infiltrate is also present. No pneumothorax is identified, there is a trace left pleural effusion, small right pleural effusion. No lung abscess or cavitation is seen in association with the areas of pneumonia. Soft tissue windows show a 12 mm low-attenuation lesion in the right thyroid lobe, which is most likely benign. There are normal-sized shotty appearing lymph nodes within the mediastinum without measurable lymphadenopathy. A small amount calcified plaques noted within the aortic arch and there is heavy calcified plaque within the coronary arteries. Heart size is normal. There is trace pericardial fluid. The visualized upper abdomen shows no acute abnormality. There is mild bilateral gynecomastia. Review of bone windows shows advanced degenerative changes in the visualized upper lumbar spine. Consolidating infiltrates within both lungs, greatest in the right lower lobe and most compatible with bilateral pneumonia, there is a small right pleural effusion and trace left pleural effusion. No pneumothorax is identified. There is no lung abscess or areas of cavitation. Mild underlying changes of paraseptal emphysema are noted. No measurable intrathoracic lymphadenopathy is identified. Signed by Dr Angel Ramsey. Holly on 2/13/2021 2:28 PM    Us Renal Complete    Result Date: 2/13/2021  EXAMINATION: US RENAL COMPLETE 2/13/2021 3:16 PM HISTORY: Acute kidney injury. Report: Sonographic images of the kidneys were obtained. COMPARISON: CT of the abdomen and pelvis with without contrast 7/31/2020. Ultrasound of the kidneys 10/23/2018. The right kidney measures 11.6 x 5.5 x 6.0 cm and has normal cortical echogenicity, with a cortical thickness that averages 12 mm. No solid mass is identified.  There are 2 cysts in the right kidney appear benign, including one at the interpolar level measures 1.3 x 1.4 x 1.0 cm (previous measurement of 0.9 x 0.8 x 0.9 cm) and one at the inferior pole that measures 2.5 x 2.9 x 2.1 cm. This cyst previously measured 2.7 x 2.2 x 2.0 cm and is essentially unchanged Color Doppler images demonstrate vascular flow within the right kidney. The left kidney measures 10.4 x 4.5 x 4.0 cm and has normal morphology and cortical echogenicity without evidence of hydronephrosis or solid mass. There is a benign-appearing cyst at the inferior pole that measures 1 cm, not clearly seen on the previous ultrasound. Color Doppler images demonstrate vascular flow within the left kidney. Additional small cysts are seen in the kidneys on prior CT but poorly visualized on today's examination. The bladder is within normal limits. Bilateral renal cysts as detailed above, with a benign appearance. Some smaller cysts seen in the kidneys on previous CT are not visualized on today's examination No hydronephrosis is identified. Both kidneys have normal cortical echogenicity. The bladder is unremarkable. Signed by Dr Carmen Breen. Holly on 2/13/2021 3:21 PM    Xr Chest Portable    Result Date: 2/12/2021  EXAMINATION: XR CHEST PORTABLE 2/12/2021 4:13 PM HISTORY: Shortness of breath, hypoxia, recent pneumonia COMPARISON: 1/18/2021 FINDINGS: The heart appears normal in size. Atherosclerotic calcifications are seen in the aorta. Bilateral airspace opacities are present with relative sparing of the upper lobes, markedly increased compared to the prior exam. There is no appreciable pneumothorax or definite pleural effusion. Bilateral pneumonia, though worse when compared to the prior exam. Follow-up PA and lateral chest radiograph to recommended in 6-8 weeks to document resolution.  Signed by Dr Neda Sexton on 2/12/2021 4:14 PM      Pertinent Labs:   CBC:   Recent Labs     02/22/21  0543 02/23/21  0617 02/24/21  0344   WBC 9.3 8.8 7.9   HGB 11.5* 11.0* 10.6*    120* 119*     BMP:    Recent Labs     02/22/21  0543 02/23/21  0305 02/24/21  0344    133* 134*   K 3.4* 3.7 3.5    101 102   CO2 22 21* 22   BUN 33* 32* 33*   CREATININE 1.4* 1.7* 1.6*   GLUCOSE 122* 117* 113*     INR: No results for input(s): INR in the last 72 hours. ABGs:No results for input(s): PH, PO2, PCO2, HCO3, BE, O2SAT in the last 72 hours. Lactic Acid:No results for input(s): LACTA in the last 72 hours. Procedures: None performed. Hospital Course:   [copied from previous provider]  56-year-old obese male with a past medical history of COPD not on home oxygen, CKD stage III, diabetes, hypertension, BPH, h/o bladder CA recently admitted for bilateral pneumonia requiring intubation and mechanical intubation discharged to inpatient rehab (by me) with improvement and eventual discharge home. Patient presents back to the ED with worsening dyspnea found to have worsening bilateral pneumonia on CXR, poor PO intake and associated ANA on CKD and elevated troponin. Cardiology consulted for elevated troponin. Troponin trending down - needs improvement in pulmonary status prior to stress testing or intervention from cardiology - plans for stress test as an outpatient. Noted to have 2 of 2 bottles staph bacteremia and infectious disease consulted. Patient continues on broad spectrum antibiotics with likely need for 14 days of IV antibiotics. Constipation resolved with management. ID have put down the recommendations for DC antibiotics which the case management is working on. Likely discharge in the morning home with home health care and the antibiotics as per ID recommendations below: Patient would require Home O2 evaluation prior to discharge. [end copied section]  2-24: Patient is feeling stable for discharge. Home O2 evaluation performed and patient is saturating 90% with exertion on room air, 92% at rest and not requiring supplemental oxygen. Will discharge to home on cefepime and vancomycin to continue through 2-26-21. Creatinine down today.  Home care will use the patient's peripheral IV, which was changed 2-22, due to brief duration of therapy.     Physical Exam:  Vitals: /71   Pulse 71   Temp 98 °F (36.7 °C) (Temporal)   Resp 18   Ht 6' (1.829 m)   Wt 238 lb (108 kg)   SpO2 91%   BMI 32.28 kg/m²   24HR INTAKE/OUTPUT:      Intake/Output Summary (Last 24 hours) at 2/24/2021 0843  Last data filed at 2/24/2021 2212  Gross per 24 hour   Intake 1000 ml   Output 1475 ml   Net -475 ml     General appearance: alert and cooperative with exam  HEENT: atraumatic, eyes with clear conjunctiva and normal lids, pupils and irises normal, external ears and nose are normal, lips normal. Neck without masses, lympadenopathy, bruit, thyroid normal  Lungs: no increased work of breathing, diminished breath sounds bibasilar  Heart: regular rate and rhythm, S1, S2 normal, no murmur, click, rub or gallop  Abdomen: soft, non-tender; bowel sounds normal; no masses,  no organomegaly and obese  Extremities: edema trace bilaterally, modified Micky's negative  Neurologic: no focal neurologic deficits, normal sensation, alert and oriented, affect and mood appropriate  Skin: no jaundice, rashes, or nodules    Discharge Medications:       Laineyprimitivo Cardona   Home Medication Instructions RSN:[id number]    Printed on:02/24/21 0843   Medication Information                      aspirin 81 MG EC tablet  Take 1 tablet by mouth daily             atorvastatin (LIPITOR) 20 MG tablet  Take 1 tablet by mouth daily             calcium carbonate (TUMS) 500 MG chewable tablet  Take 1 tablet by mouth 3 times daily as needed for Heartburn             cefepime (MAXIPIME) infusion  Infuse 2,000 mg intravenously every 12 hours for 2 days Compound per protocol             famotidine (PEPCID) 20 MG tablet  Take 1 tablet by mouth daily             glipiZIDE (GLUCOTROL) 5 MG tablet  Take 1 tablet by mouth every morning (before breakfast)             guaiFENesin (ROBITUSSIN) 100 MG/5ML syrup  Take 10 mLs by mouth every 4 hours as needed for Cough hydrALAZINE (APRESOLINE) 25 MG tablet  Take 1 tablet by mouth every 8 hours             metoprolol succinate (TOPROL XL) 100 MG extended release tablet  Take 1 tablet by mouth daily             NIFEdipine (ADALAT CC) 60 MG extended release tablet  Take 1 tablet by mouth daily             pantoprazole (PROTONIX) 40 MG tablet  Take 1 tablet by mouth daily             tamsulosin (FLOMAX) 0.4 MG capsule  Take 1 capsule by mouth daily             trospium (SANCTURA) 20 MG tablet  Take 1 tablet by mouth nightly             vancomycin (VANCOCIN) infusion  Infuse 1,500 mg intravenously every 24 hours for 2 days Compound per protocol. Discharge Instructions: Follow up with Ramin Thorne MD in 7 days. Take medications as directed. Resume activity as tolerated. Diet: DIET CARDIAC; Carb Control: 4 carb choices (60 gms)/meal     Disposition: Patient is medically stable and will be discharged Home. Time spent on discharge greater than 30 minutes.     Signed:  Cam Ugalde DO

## 2021-02-26 ENCOUNTER — TELEPHONE (OUTPATIENT)
Dept: NEUROLOGY | Age: 81
End: 2021-02-26

## 2021-02-26 RX ORDER — NIFEDIPINE 60 MG/1
60 TABLET, FILM COATED, EXTENDED RELEASE ORAL DAILY
Qty: 30 TABLET | Refills: 0 | OUTPATIENT
Start: 2021-02-26

## 2021-02-26 NOTE — TELEPHONE ENCOUNTER
Jaclyn RN from [address] [place] [address]. left message stating that the patient has an IV still that he was receiving antibiotics through and they want to know if the line can be D/C when antibiotics are through. Ph. [phone]  Fax. [phone]      Spoke with Suzette Street at [facility] and voiced that I would ask doctor Isaac, however home health should really be followed by the patients' PCP. She also voiced that  aplan of care and two additional orders were being faxed today and need to be signed off on. I explained that Dr. Kia Begum will likely be willing to do this but further orders need to come from patient PCP. She then transferred me to another staff member and I gave order that Dr. Kia Begum will sign orders that we recently received but any further orders needs to followed by Dr. Donnie Willams. She voiced understanding and said she would reach out to Dr. Donnie Willams.

## 2021-03-02 DIAGNOSIS — I70.213 ATHEROSCLEROSIS OF NATIVE ARTERY OF BOTH LOWER EXTREMITIES WITH INTERMITTENT CLAUDICATION (HCC): ICD-10-CM

## 2021-03-02 DIAGNOSIS — I65.23 BILATERAL CAROTID ARTERY STENOSIS: Primary | ICD-10-CM

## 2021-03-04 ENCOUNTER — IMMUNIZATION (OUTPATIENT)
Age: 81
End: 2021-03-04
Payer: MEDICARE

## 2021-03-04 PROCEDURE — 0001A COVID-19, PFIZER VACCINE 30MCG/0.3ML DOSE: CPT | Performed by: FAMILY MEDICINE

## 2021-03-04 PROCEDURE — 91300 COVID-19, PFIZER VACCINE 30MCG/0.3ML DOSE: CPT | Performed by: FAMILY MEDICINE

## 2021-03-06 ENCOUNTER — APPOINTMENT (OUTPATIENT)
Dept: CT IMAGING | Age: 81
DRG: 981 | End: 2021-03-06
Payer: MEDICARE

## 2021-03-06 ENCOUNTER — HOSPITAL ENCOUNTER (INPATIENT)
Age: 81
LOS: 9 days | Discharge: NURSING, REHAB OR LONG-TERM CARE | DRG: 981 | End: 2021-03-15
Attending: EMERGENCY MEDICINE | Admitting: INTERNAL MEDICINE
Payer: MEDICARE

## 2021-03-06 ENCOUNTER — APPOINTMENT (OUTPATIENT)
Dept: GENERAL RADIOLOGY | Age: 81
DRG: 981 | End: 2021-03-06
Payer: MEDICARE

## 2021-03-06 DIAGNOSIS — I47.29 NSVT (NONSUSTAINED VENTRICULAR TACHYCARDIA): ICD-10-CM

## 2021-03-06 DIAGNOSIS — J18.9 RECURRENT PNEUMONIA: ICD-10-CM

## 2021-03-06 DIAGNOSIS — J18.9 PNEUMONIA DUE TO ORGANISM: ICD-10-CM

## 2021-03-06 DIAGNOSIS — J96.21 ACUTE ON CHRONIC RESPIRATORY FAILURE WITH HYPOXIA AND HYPERCAPNIA (HCC): Primary | ICD-10-CM

## 2021-03-06 DIAGNOSIS — J96.22 ACUTE ON CHRONIC RESPIRATORY FAILURE WITH HYPOXIA AND HYPERCAPNIA (HCC): Primary | ICD-10-CM

## 2021-03-06 LAB
ADENOVIRUS BY PCR: NOT DETECTED
ALBUMIN SERPL-MCNC: 3.8 G/DL (ref 3.5–5.2)
ALP BLD-CCNC: 137 U/L (ref 40–130)
ALT SERPL-CCNC: 21 U/L (ref 5–41)
ANION GAP SERPL CALCULATED.3IONS-SCNC: 7 MMOL/L (ref 7–19)
APTT: 36 SEC (ref 26–36.2)
AST SERPL-CCNC: 16 U/L (ref 5–40)
BASE EXCESS ARTERIAL: 7.4 MMOL/L (ref -2–2)
BASOPHILS ABSOLUTE: 0 K/UL (ref 0–0.2)
BASOPHILS RELATIVE PERCENT: 0.3 % (ref 0–1)
BILIRUB SERPL-MCNC: 0.5 MG/DL (ref 0.2–1.2)
BORDETELLA PARAPERTUSSIS BY PCR: NOT DETECTED
BORDETELLA PERTUSSIS BY PCR: NOT DETECTED
BUN BLDV-MCNC: 29 MG/DL (ref 8–23)
CALCIUM SERPL-MCNC: 9.2 MG/DL (ref 8.8–10.2)
CARBOXYHEMOGLOBIN ARTERIAL: 2.6 % (ref 0–5)
CHLAMYDOPHILIA PNEUMONIAE BY PCR: NOT DETECTED
CHLORIDE BLD-SCNC: 102 MMOL/L (ref 98–111)
CO2: 34 MMOL/L (ref 22–29)
CORONAVIRUS 229E BY PCR: NOT DETECTED
CORONAVIRUS HKU1 BY PCR: NOT DETECTED
CORONAVIRUS NL63 BY PCR: NOT DETECTED
CORONAVIRUS OC43 BY PCR: NOT DETECTED
CREAT SERPL-MCNC: 1.3 MG/DL (ref 0.5–1.2)
D DIMER: 1.59 UG/ML FEU (ref 0–0.48)
EOSINOPHILS ABSOLUTE: 0 K/UL (ref 0–0.6)
EOSINOPHILS RELATIVE PERCENT: 0 % (ref 0–5)
GFR AFRICAN AMERICAN: >59
GFR NON-AFRICAN AMERICAN: 53
GLUCOSE BLD-MCNC: 182 MG/DL (ref 74–109)
GLUCOSE BLD-MCNC: 183 MG/DL (ref 70–99)
GLUCOSE BLD-MCNC: 231 MG/DL (ref 70–99)
HCO3 ARTERIAL: 35.3 MMOL/L (ref 22–26)
HCT VFR BLD CALC: 37.8 % (ref 42–52)
HEMOGLOBIN, ART, EXTENDED: 11.3 G/DL (ref 14–18)
HEMOGLOBIN: 11.2 G/DL (ref 14–18)
HUMAN METAPNEUMOVIRUS BY PCR: NOT DETECTED
HUMAN RHINOVIRUS/ENTEROVIRUS BY PCR: NOT DETECTED
IMMATURE GRANULOCYTES #: 0.2 K/UL
INFLUENZA A BY PCR: NOT DETECTED
INFLUENZA B BY PCR: NOT DETECTED
INR BLD: 1.13 (ref 0.88–1.18)
L. PNEUMOPHILA SEROGP 1 UR AG: NORMAL
LACTIC ACID: 0.6 MMOL/L (ref 0.5–1.9)
LYMPHOCYTES ABSOLUTE: 0.5 K/UL (ref 1.1–4.5)
LYMPHOCYTES RELATIVE PERCENT: 7.1 % (ref 20–40)
MCH RBC QN AUTO: 27.6 PG (ref 27–31)
MCHC RBC AUTO-ENTMCNC: 29.6 G/DL (ref 33–37)
MCV RBC AUTO: 93.1 FL (ref 80–94)
METHEMOGLOBIN ARTERIAL: 0.8 %
MONOCYTES ABSOLUTE: 0.7 K/UL (ref 0–0.9)
MONOCYTES RELATIVE PERCENT: 10.8 % (ref 0–10)
MYCOPLASMA PNEUMONIAE BY PCR: NOT DETECTED
NEUTROPHILS ABSOLUTE: 5.2 K/UL (ref 1.5–7.5)
NEUTROPHILS RELATIVE PERCENT: 79.5 % (ref 50–65)
O2 CONTENT ARTERIAL: 13.5 ML/DL
O2 SAT, ARTERIAL: 84.8 %
O2 THERAPY: ABNORMAL
PARAINFLUENZA VIRUS 1 BY PCR: NOT DETECTED
PARAINFLUENZA VIRUS 2 BY PCR: NOT DETECTED
PARAINFLUENZA VIRUS 3 BY PCR: NOT DETECTED
PARAINFLUENZA VIRUS 4 BY PCR: NOT DETECTED
PCO2 ARTERIAL: 67 MMHG (ref 35–45)
PDW BLD-RTO: 14.8 % (ref 11.5–14.5)
PERFORMED ON: ABNORMAL
PERFORMED ON: ABNORMAL
PH ARTERIAL: 7.33 (ref 7.35–7.45)
PLATELET # BLD: 131 K/UL (ref 130–400)
PMV BLD AUTO: 10.7 FL (ref 9.4–12.4)
PO2 ARTERIAL: 51 MMHG (ref 80–100)
POTASSIUM SERPL-SCNC: 4.4 MMOL/L (ref 3.5–5)
POTASSIUM, WHOLE BLOOD: 4.1
PRO-BNP: 1710 PG/ML (ref 0–1800)
PROTHROMBIN TIME: 14.4 SEC (ref 12–14.6)
RBC # BLD: 4.06 M/UL (ref 4.7–6.1)
RESPIRATORY SYNCYTIAL VIRUS BY PCR: NOT DETECTED
SARS-COV-2, PCR: NOT DETECTED
SODIUM BLD-SCNC: 143 MMOL/L (ref 136–145)
STREP PNEUMONIAE ANTIGEN, URINE: NORMAL
TOTAL PROTEIN: 6.7 G/DL (ref 6.6–8.7)
TROPONIN: 0.03 NG/ML (ref 0–0.03)
WBC # BLD: 6.5 K/UL (ref 4.8–10.8)

## 2021-03-06 PROCEDURE — 6360000002 HC RX W HCPCS: Performed by: INTERNAL MEDICINE

## 2021-03-06 PROCEDURE — 2580000003 HC RX 258: Performed by: EMERGENCY MEDICINE

## 2021-03-06 PROCEDURE — 83880 ASSAY OF NATRIURETIC PEPTIDE: CPT

## 2021-03-06 PROCEDURE — 5A09557 ASSISTANCE WITH RESPIRATORY VENTILATION, GREATER THAN 96 CONSECUTIVE HOURS, CONTINUOUS POSITIVE AIRWAY PRESSURE: ICD-10-PCS | Performed by: INTERNAL MEDICINE

## 2021-03-06 PROCEDURE — 85379 FIBRIN DEGRADATION QUANT: CPT

## 2021-03-06 PROCEDURE — 96374 THER/PROPH/DIAG INJ IV PUSH: CPT

## 2021-03-06 PROCEDURE — 84484 ASSAY OF TROPONIN QUANT: CPT

## 2021-03-06 PROCEDURE — 2500000003 HC RX 250 WO HCPCS: Performed by: INTERNAL MEDICINE

## 2021-03-06 PROCEDURE — 82947 ASSAY GLUCOSE BLOOD QUANT: CPT

## 2021-03-06 PROCEDURE — 6360000002 HC RX W HCPCS: Performed by: EMERGENCY MEDICINE

## 2021-03-06 PROCEDURE — 80053 COMPREHEN METABOLIC PANEL: CPT

## 2021-03-06 PROCEDURE — 0202U NFCT DS 22 TRGT SARS-COV-2: CPT

## 2021-03-06 PROCEDURE — 94640 AIRWAY INHALATION TREATMENT: CPT

## 2021-03-06 PROCEDURE — 85730 THROMBOPLASTIN TIME PARTIAL: CPT

## 2021-03-06 PROCEDURE — 87040 BLOOD CULTURE FOR BACTERIA: CPT

## 2021-03-06 PROCEDURE — 6370000000 HC RX 637 (ALT 250 FOR IP): Performed by: INTERNAL MEDICINE

## 2021-03-06 PROCEDURE — 93005 ELECTROCARDIOGRAM TRACING: CPT | Performed by: INTERNAL MEDICINE

## 2021-03-06 PROCEDURE — 36600 WITHDRAWAL OF ARTERIAL BLOOD: CPT

## 2021-03-06 PROCEDURE — 86738 MYCOPLASMA ANTIBODY: CPT

## 2021-03-06 PROCEDURE — 36415 COLL VENOUS BLD VENIPUNCTURE: CPT

## 2021-03-06 PROCEDURE — 83605 ASSAY OF LACTIC ACID: CPT

## 2021-03-06 PROCEDURE — 71045 X-RAY EXAM CHEST 1 VIEW: CPT

## 2021-03-06 PROCEDURE — 6370000000 HC RX 637 (ALT 250 FOR IP): Performed by: EMERGENCY MEDICINE

## 2021-03-06 PROCEDURE — 87449 NOS EACH ORGANISM AG IA: CPT

## 2021-03-06 PROCEDURE — 94660 CPAP INITIATION&MGMT: CPT

## 2021-03-06 PROCEDURE — 71275 CT ANGIOGRAPHY CHEST: CPT

## 2021-03-06 PROCEDURE — 82803 BLOOD GASES ANY COMBINATION: CPT

## 2021-03-06 PROCEDURE — 85610 PROTHROMBIN TIME: CPT

## 2021-03-06 PROCEDURE — 2700000000 HC OXYGEN THERAPY PER DAY

## 2021-03-06 PROCEDURE — 85025 COMPLETE CBC W/AUTO DIFF WBC: CPT

## 2021-03-06 PROCEDURE — 2580000003 HC RX 258: Performed by: INTERNAL MEDICINE

## 2021-03-06 PROCEDURE — 6360000004 HC RX CONTRAST MEDICATION: Performed by: EMERGENCY MEDICINE

## 2021-03-06 PROCEDURE — 2000000000 HC ICU R&B

## 2021-03-06 PROCEDURE — 84132 ASSAY OF SERUM POTASSIUM: CPT

## 2021-03-06 PROCEDURE — 99285 EMERGENCY DEPT VISIT HI MDM: CPT

## 2021-03-06 PROCEDURE — 96375 TX/PRO/DX INJ NEW DRUG ADDON: CPT

## 2021-03-06 RX ORDER — NIFEDIPINE 60 MG/1
60 TABLET, EXTENDED RELEASE ORAL DAILY
Status: DISCONTINUED | OUTPATIENT
Start: 2021-03-06 | End: 2021-03-15 | Stop reason: HOSPADM

## 2021-03-06 RX ORDER — 0.9 % SODIUM CHLORIDE 0.9 %
1000 INTRAVENOUS SOLUTION INTRAVENOUS ONCE
Status: COMPLETED | OUTPATIENT
Start: 2021-03-06 | End: 2021-03-06

## 2021-03-06 RX ORDER — TAMSULOSIN HYDROCHLORIDE 0.4 MG/1
0.4 CAPSULE ORAL DAILY
Status: DISCONTINUED | OUTPATIENT
Start: 2021-03-06 | End: 2021-03-07

## 2021-03-06 RX ORDER — ATORVASTATIN CALCIUM 20 MG/1
20 TABLET, FILM COATED ORAL DAILY
Status: DISCONTINUED | OUTPATIENT
Start: 2021-03-06 | End: 2021-03-15 | Stop reason: HOSPADM

## 2021-03-06 RX ORDER — IPRATROPIUM BROMIDE AND ALBUTEROL SULFATE 2.5; .5 MG/3ML; MG/3ML
1 SOLUTION RESPIRATORY (INHALATION)
Status: DISCONTINUED | OUTPATIENT
Start: 2021-03-06 | End: 2021-03-06

## 2021-03-06 RX ORDER — DEXTROSE MONOHYDRATE 25 G/50ML
12.5 INJECTION, SOLUTION INTRAVENOUS PRN
Status: DISCONTINUED | OUTPATIENT
Start: 2021-03-06 | End: 2021-03-15 | Stop reason: HOSPADM

## 2021-03-06 RX ORDER — SODIUM CHLORIDE 0.9 % (FLUSH) 0.9 %
10 SYRINGE (ML) INJECTION PRN
Status: DISCONTINUED | OUTPATIENT
Start: 2021-03-06 | End: 2021-03-14 | Stop reason: SDUPTHER

## 2021-03-06 RX ORDER — IPRATROPIUM BROMIDE AND ALBUTEROL SULFATE 2.5; .5 MG/3ML; MG/3ML
1 SOLUTION RESPIRATORY (INHALATION) EVERY 4 HOURS
Status: DISCONTINUED | OUTPATIENT
Start: 2021-03-06 | End: 2021-03-15 | Stop reason: HOSPADM

## 2021-03-06 RX ORDER — METOPROLOL SUCCINATE 50 MG/1
100 TABLET, EXTENDED RELEASE ORAL DAILY
Status: DISCONTINUED | OUTPATIENT
Start: 2021-03-06 | End: 2021-03-15 | Stop reason: HOSPADM

## 2021-03-06 RX ORDER — METHYLPREDNISOLONE SODIUM SUCCINATE 40 MG/ML
40 INJECTION, POWDER, LYOPHILIZED, FOR SOLUTION INTRAMUSCULAR; INTRAVENOUS EVERY 8 HOURS
Status: DISCONTINUED | OUTPATIENT
Start: 2021-03-06 | End: 2021-03-09

## 2021-03-06 RX ORDER — LEVOFLOXACIN 5 MG/ML
750 INJECTION, SOLUTION INTRAVENOUS ONCE
Status: COMPLETED | OUTPATIENT
Start: 2021-03-06 | End: 2021-03-06

## 2021-03-06 RX ORDER — LABETALOL HYDROCHLORIDE 5 MG/ML
20 INJECTION, SOLUTION INTRAVENOUS EVERY 4 HOURS PRN
Status: DISCONTINUED | OUTPATIENT
Start: 2021-03-06 | End: 2021-03-15 | Stop reason: HOSPADM

## 2021-03-06 RX ORDER — ACETAMINOPHEN 325 MG/1
650 TABLET ORAL EVERY 6 HOURS PRN
Status: DISCONTINUED | OUTPATIENT
Start: 2021-03-06 | End: 2021-03-15 | Stop reason: HOSPADM

## 2021-03-06 RX ORDER — SODIUM CHLORIDE 0.9 % (FLUSH) 0.9 %
10 SYRINGE (ML) INJECTION EVERY 12 HOURS SCHEDULED
Status: DISCONTINUED | OUTPATIENT
Start: 2021-03-06 | End: 2021-03-14 | Stop reason: SDUPTHER

## 2021-03-06 RX ORDER — METHYLPREDNISOLONE SODIUM SUCCINATE 125 MG/2ML
125 INJECTION, POWDER, LYOPHILIZED, FOR SOLUTION INTRAMUSCULAR; INTRAVENOUS ONCE
Status: COMPLETED | OUTPATIENT
Start: 2021-03-06 | End: 2021-03-06

## 2021-03-06 RX ORDER — PANTOPRAZOLE SODIUM 40 MG/1
40 TABLET, DELAYED RELEASE ORAL DAILY
Status: DISCONTINUED | OUTPATIENT
Start: 2021-03-06 | End: 2021-03-15 | Stop reason: HOSPADM

## 2021-03-06 RX ORDER — DEXTROSE MONOHYDRATE 50 MG/ML
100 INJECTION, SOLUTION INTRAVENOUS PRN
Status: DISCONTINUED | OUTPATIENT
Start: 2021-03-06 | End: 2021-03-15 | Stop reason: HOSPADM

## 2021-03-06 RX ORDER — TROSPIUM CHLORIDE 20 MG/1
20 TABLET, FILM COATED ORAL NIGHTLY
Status: DISCONTINUED | OUTPATIENT
Start: 2021-03-06 | End: 2021-03-15 | Stop reason: HOSPADM

## 2021-03-06 RX ORDER — HYDRALAZINE HYDROCHLORIDE 25 MG/1
25 TABLET, FILM COATED ORAL EVERY 8 HOURS SCHEDULED
Status: DISCONTINUED | OUTPATIENT
Start: 2021-03-06 | End: 2021-03-15

## 2021-03-06 RX ORDER — ASPIRIN 81 MG/1
81 TABLET ORAL DAILY
Status: DISCONTINUED | OUTPATIENT
Start: 2021-03-06 | End: 2021-03-15 | Stop reason: HOSPADM

## 2021-03-06 RX ORDER — IPRATROPIUM BROMIDE AND ALBUTEROL SULFATE 2.5; .5 MG/3ML; MG/3ML
1 SOLUTION RESPIRATORY (INHALATION) ONCE
Status: COMPLETED | OUTPATIENT
Start: 2021-03-06 | End: 2021-03-06

## 2021-03-06 RX ORDER — NICOTINE POLACRILEX 4 MG
15 LOZENGE BUCCAL PRN
Status: DISCONTINUED | OUTPATIENT
Start: 2021-03-06 | End: 2021-03-15 | Stop reason: HOSPADM

## 2021-03-06 RX ORDER — ACETAMINOPHEN 650 MG/1
650 SUPPOSITORY RECTAL EVERY 6 HOURS PRN
Status: DISCONTINUED | OUTPATIENT
Start: 2021-03-06 | End: 2021-03-15 | Stop reason: HOSPADM

## 2021-03-06 RX ORDER — CALCIUM CARBONATE 200(500)MG
500 TABLET,CHEWABLE ORAL 3 TIMES DAILY PRN
Status: DISCONTINUED | OUTPATIENT
Start: 2021-03-06 | End: 2021-03-15 | Stop reason: HOSPADM

## 2021-03-06 RX ORDER — POLYETHYLENE GLYCOL 3350 17 G/17G
17 POWDER, FOR SOLUTION ORAL DAILY PRN
Status: DISCONTINUED | OUTPATIENT
Start: 2021-03-06 | End: 2021-03-15 | Stop reason: HOSPADM

## 2021-03-06 RX ADMIN — NIFEDIPINE 60 MG: 60 TABLET, FILM COATED, EXTENDED RELEASE ORAL at 17:26

## 2021-03-06 RX ADMIN — PANTOPRAZOLE SODIUM 40 MG: 40 TABLET, DELAYED RELEASE ORAL at 17:23

## 2021-03-06 RX ADMIN — ENOXAPARIN SODIUM 40 MG: 40 INJECTION SUBCUTANEOUS at 17:04

## 2021-03-06 RX ADMIN — METOPROLOL SUCCINATE 100 MG: 50 TABLET, EXTENDED RELEASE ORAL at 17:23

## 2021-03-06 RX ADMIN — METHYLPREDNISOLONE SODIUM SUCCINATE 125 MG: 125 INJECTION, POWDER, FOR SOLUTION INTRAMUSCULAR; INTRAVENOUS at 14:17

## 2021-03-06 RX ADMIN — MEROPENEM 1000 MG: 1 INJECTION, POWDER, FOR SOLUTION INTRAVENOUS at 21:28

## 2021-03-06 RX ADMIN — MEROPENEM 1000 MG: 1 INJECTION, POWDER, FOR SOLUTION INTRAVENOUS at 15:16

## 2021-03-06 RX ADMIN — INSULIN LISPRO 1 UNITS: 100 INJECTION, SOLUTION INTRAVENOUS; SUBCUTANEOUS at 21:29

## 2021-03-06 RX ADMIN — TROSPIUM CHLORIDE 20 MG: 20 TABLET, FILM COATED ORAL at 21:29

## 2021-03-06 RX ADMIN — METHYLPREDNISOLONE SODIUM SUCCINATE 40 MG: 40 INJECTION, POWDER, FOR SOLUTION INTRAMUSCULAR; INTRAVENOUS at 21:29

## 2021-03-06 RX ADMIN — VANCOMYCIN HYDROCHLORIDE 1500 MG: 10 INJECTION, POWDER, LYOPHILIZED, FOR SOLUTION INTRAVENOUS at 15:18

## 2021-03-06 RX ADMIN — IPRATROPIUM BROMIDE AND ALBUTEROL SULFATE 1 AMPULE: 2.5; .5 SOLUTION RESPIRATORY (INHALATION) at 18:28

## 2021-03-06 RX ADMIN — SODIUM CHLORIDE 1000 ML: 9 INJECTION, SOLUTION INTRAVENOUS at 15:16

## 2021-03-06 RX ADMIN — INSULIN LISPRO 2 UNITS: 100 INJECTION, SOLUTION INTRAVENOUS; SUBCUTANEOUS at 17:08

## 2021-03-06 RX ADMIN — TAMSULOSIN HYDROCHLORIDE 0.4 MG: 0.4 CAPSULE ORAL at 17:24

## 2021-03-06 RX ADMIN — LEVOFLOXACIN 750 MG: 5 INJECTION, SOLUTION INTRAVENOUS at 15:52

## 2021-03-06 RX ADMIN — HYDRALAZINE HYDROCHLORIDE 25 MG: 25 TABLET, FILM COATED ORAL at 21:29

## 2021-03-06 RX ADMIN — ATORVASTATIN CALCIUM 20 MG: 20 TABLET, FILM COATED ORAL at 17:24

## 2021-03-06 RX ADMIN — HYDRALAZINE HYDROCHLORIDE 25 MG: 25 TABLET, FILM COATED ORAL at 17:24

## 2021-03-06 RX ADMIN — IOPAMIDOL 90 ML: 755 INJECTION, SOLUTION INTRAVENOUS at 13:33

## 2021-03-06 RX ADMIN — DOXYCYCLINE 100 MG: 100 INJECTION, POWDER, LYOPHILIZED, FOR SOLUTION INTRAVENOUS at 17:02

## 2021-03-06 RX ADMIN — IPRATROPIUM BROMIDE AND ALBUTEROL SULFATE 1 AMPULE: 2.5; .5 SOLUTION RESPIRATORY (INHALATION) at 22:05

## 2021-03-06 RX ADMIN — ASPIRIN 81 MG: 81 TABLET, FILM COATED ORAL at 17:24

## 2021-03-06 RX ADMIN — IPRATROPIUM BROMIDE AND ALBUTEROL SULFATE 1 AMPULE: .5; 3 SOLUTION RESPIRATORY (INHALATION) at 13:44

## 2021-03-06 ASSESSMENT — ENCOUNTER SYMPTOMS
BACK PAIN: 0
VOMITING: 0
RHINORRHEA: 0
SORE THROAT: 0
DIARRHEA: 0
SHORTNESS OF BREATH: 1
ABDOMINAL PAIN: 0
NAUSEA: 0
COUGH: 1

## 2021-03-06 ASSESSMENT — PAIN SCALES - GENERAL
PAINLEVEL_OUTOF10: 0
PAINLEVEL_OUTOF10: 0

## 2021-03-06 NOTE — PROGRESS NOTES
Results for Pati Oates (MRN [id number]) as of 3/6/2021 12:47   Ref.  Range 3/6/2021 12:43   O2 Therapy Unknown Unknown   Hemoglobin, Art, Extended Latest Ref Range: 14.0 - 18.0 g/dL 11.3 (L)   pH, Arterial Latest Ref Range: 7.350 - 7.450  7.330 (L)   pCO2, Arterial Latest Ref Range: 35.0 - 45.0 mmHg 67.0 (H)   pO2, Arterial Latest Ref Range: 80.0 - 100.0 mmHg 51.0 (L)   HCO3, Arterial Latest Ref Range: 22.0 - 26.0 mmol/L 35.3 (H)   Base Excess, Arterial Latest Ref Range: -2.0 - 2.0 mmol/L 7.4 (H)   O2 Sat, Arterial Latest Ref Range: >92 % 84.8 (LL)   O2 Content, Arterial Latest Ref Range: Not Established mL/dL 13.5   Methemoglobin, Arterial Latest Ref Range: <1.5 % 0.8   Carboxyhgb, Arterial Latest Ref Range: 0.0 - 5.0 % 2.6   Pt on 2 lpm nc, RR, AT+

## 2021-03-06 NOTE — PROGRESS NOTES
Pharmacy Note  Vancomycin Consult    Luis Jha is a 79 y.o. male started on Vancomycin for HAP; consult received from Dr. Alfred Pugh to manage therapy. Also receiving the following antibiotics: meropenem    Active Problems:    * No active hospital problems. *  Resolved Problems:    * No resolved hospital problems. *      Allergies:  Eliquis [apixaban] and Promethazine hcl     Temp max: 98.9    Recent Labs     03/06/21  1231   BUN 29*       Recent Labs     03/06/21  1231   CREATININE 1.3*       Recent Labs     03/06/21  1231   WBC 6.5       No intake or output data in the 24 hours ending 03/06/21 1451    Culture Date Source Results   03/06/21 Resp    03/06/21 Blood x 2             Ht Readings from Last 1 Encounters:   03/06/21 6' (1.829 m)        Wt Readings from Last 1 Encounters:   03/06/21 230 lb (104.3 kg)         Body mass index is 31.19 kg/m². Estimated Creatinine Clearance: 58 mL/min (A) (based on SCr of 1.3 mg/dL (H)). Assessment/Plan:  Will initiate vancomycin 1500 mg IV x 1 dose, followed by 1000 mg every 24 hours. Timing of trough level will be determined based on culture results, renal function, and clinical response. Thank you for the consult. Will continue to follow.     Electronically signed by Domi Abraham [facility] on 3/6/2021 at 2:51 PM

## 2021-03-06 NOTE — ED NOTES
Bed: 07  Expected date:   Expected time:   Means of arrival:   Comments:  EMS     Anca Alves RN  03/06/21 3033

## 2021-03-06 NOTE — ED PROVIDER NOTES
[facility] EMERGENCY DEPT  eMERGENCY dEPARTMENT eNCOUnter      Pt Name: Karen Jimenez  MRN: [id number]  [place] 1940  Date of evaluation: 3/6/2021  Provider: Nela Lopez MD    [address] [place] [address]       Chief Complaint   Patient presents with    Shortness of Breath     Pt presents to ED with c/o SOA. States he is usually on 1 L and had a pneumonia diagnosis in feburary          HISTORY OF PRESENT ILLNESS   (Location/Symptom, Timing/Onset,Context/Setting, Quality, Duration, Modifying Factors, Severity)  Note limiting factors. Karen Jimenez is a 79 y.o. male who presents to the emergency department with shortness of breath. The patient states that he was first admitted to the hospital in January for pneumonia, requiring intubation. He said he returned to the hospital in mid February and had bilateral pneumonia. He was discharged home and was getting home infusions of antibiotics. His last dose was about a week ago. He said he started to feel ill again a few days after stopping antibiotics and a few hours after doing his first Covid vaccine. Patient was not wearing oxygen prior to his initial bout with pneumonia in January. He is currently on 2 L of oxygen at home. He says he feels like he cannot get a deep breath. He has no prior history of COPD. He is a former smoker. No prior history of blood clots. No pedal edema. He said he has been running around a  temp. HPI    NursingNotes were reviewed. REVIEW OF SYSTEMS    (2-9 systems for level 4, 10 or more for level 5)     Review of Systems   Constitutional: Positive for activity change, appetite change, chills, diaphoresis, fatigue and fever. HENT: Negative for rhinorrhea and sore throat. Respiratory: Positive for cough and shortness of breath. Cardiovascular: Negative for chest pain and leg swelling. Gastrointestinal: Negative for abdominal pain, diarrhea, nausea and vomiting. Genitourinary: Negative for difficulty urinating. Musculoskeletal: Negative for back pain and neck pain. Skin: Negative for rash. Neurological: Positive for weakness. Negative for headaches. Psychiatric/Behavioral: Negative for confusion. A complete review of systems was performed and is negative except as noted above in the HPI. PAST MEDICAL HISTORY     Past Medical History:   Diagnosis Date    Acute liver failure without hepatic coma 10/23/2018    Back pain     \"with tired legs as a result\"    Bladder cancer ([facility] 12/19/2018    Blood circulation, collateral     Carotid arterial disease ([address]     recent surgery    CKD (chronic kidney disease), 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/2018    COPD with acute lower respiratory infection ([address] 2/24/2021    GERD (gastroesophageal reflux disease)     Hyperlipidemia     Hypertension     Hypertension     Palliative care patient 10/23/2018    Pneumonia due to infectious organism 11/06/2018    Primary osteoarthritis of left knee 10/14/2018    PVD (peripheral vascular disease) (HCC)     Tremor     Tremor on Right side x 1-2 weeks per stepdaughter    Type 2 diabetes mellitus with complication, without long-term current use of insulin ([address] 1/21/2021         SURGICAL HISTORY       Past Surgical History:   Procedure Laterality Date    BACK SURGERY      COLONOSCOPY  2007?     CYSTOSCOPY Bilateral 12/19/2018    CYSTOSCOPY, BIOSPY FULGURATION OF BLADDER TUMOR POSSIBLE TURBT, RETROGRADE PYELOGRAM performed by Anabell Cooper MD at [address] COLONOSCOPY FLX DX W/COLLJ SPEC WHEN PFRMD N/A 9/11/2017    Dr Gage Reid internal hemorrhoids, diverticular disease-HP-No recall (age)   [facility] [place] REVISE MEDIAN N/CARPAL TUNNEL SURG Left 7/18/2018    OPEN CARPAL TUNNEL RELEASE performed by Miranda Farias MD at [address] Left 8/28/2018    LEFT CAROTID ENDARTERECTOMY WITH VEIN PATCH ANGIOPLASTY AND COMPLETION ANGIOGRAM performed by Luiza Leon MD at [address] ARTHROPLASTY Left 10/15/2018    LEFT COMPLEX TOTAL KNEE ARTHROPLASTY performed by Gus Huang MD at [facility] VASCULAR SURGERY  04/21/2015    Brian BESS Ultrasound guided access of left common femoral artery. Aortogram.Diagnostic right lower extremity arteriogram.Radiologic supervision and interpretation.  VASCULAR SURGERY  01/13/2015    Brian Clement M.D Atherectomy,angioplasty,and stenting of left superficial femoral artery.  VASCULAR SURGERY  03/11/2014    Brian Clement M.D. Ultrasound-guided access of right common femoral artery. Aortogram.Left lower extremity arteriogram.Atherectomy and angioplasty of left superficial femoral artery. Radiologic supervision and interpretation.  VASCULAR SURGERY  01/18/2013    Brian LIU. Aortogram.Multistation arteriogram right lower extremity. Laser atherectomy and angioplasty of right superficial femoral artery. Selective catheterization of right tibioperoneal trunk. Angioplasty of peroneal artery and tibioperoneal trunk.  VASCULAR SURGERY  10/30/2018    SJS. Ultrasound guided cannulation of right internal vein. Placement of right internal jugular vein tunneled dialysis catheter bard Proteopureeam xk 23cm tip to cuff    VASCULAR SURGERY  12/17/2018    SJS. Removal of tunneled dilaysis catheter right internal jugular vein.          CURRENT MEDICATIONS       Previous Medications    ASPIRIN 81 MG EC TABLET    Take 1 tablet by mouth daily    ATORVASTATIN (LIPITOR) 20 MG TABLET    Take 1 tablet by mouth daily    CALCIUM CARBONATE (TUMS) 500 MG CHEWABLE TABLET    Take 1 tablet by mouth 3 times daily as needed for Heartburn    FAMOTIDINE (PEPCID) 20 MG TABLET    Take 1 tablet by mouth daily    GLIPIZIDE (GLUCOTROL) 5 MG TABLET    Take 1 tablet by mouth every morning (before breakfast)    GUAIFENESIN (ROBITUSSIN) 100 MG/5ML SYRUP    Take 10 mLs by mouth every 4 hours as needed for Cough    HYDRALAZINE (APRESOLINE) 25 MG TABLET Take 1 tablet by mouth every 8 hours    METOPROLOL SUCCINATE (TOPROL XL) 100 MG EXTENDED RELEASE TABLET    Take 1 tablet by mouth daily    NIFEDIPINE (ADALAT CC) 60 MG EXTENDED RELEASE TABLET    Take 1 tablet by mouth daily    PANTOPRAZOLE (PROTONIX) 40 MG TABLET    Take 1 tablet by mouth daily    TAMSULOSIN (FLOMAX) 0.4 MG CAPSULE    Take 1 capsule by mouth daily    TROSPIUM (SANCTURA) 20 MG TABLET    Take 1 tablet by mouth nightly       ALLERGIES     Eliquis [apixaban] and Promethazine hcl    FAMILY HISTORY       Family History   Problem Relation Age of Onset    Colon Cancer Father     Diabetes Brother     Colon Polyps Neg Hx     Liver Cancer Neg Hx     Liver Disease Neg Hx     Esophageal Cancer Neg Hx     Rectal Cancer Neg Hx     Stomach Cancer Neg Hx           SOCIAL HISTORY       Social History     Socioeconomic History    Marital status:      Spouse name: None    Number of children: None    Years of education: None    Highest education level: None   Occupational History    None   Social Needs    Financial resource strain: None    Food insecurity     Worry: None     Inability: None    Transportation needs     Medical: None     Non-medical: None   Tobacco Use    Smoking status: Former Smoker     Quit date: 6/3/2003     Years since quittin.7    Smokeless tobacco: Never Used   Substance and Sexual Activity    Alcohol use:  Yes     Alcohol/week: 12.0 standard drinks     Types: 12 Glasses of wine per week     Comment: 2 glasses of wine every night    Drug use: No    Sexual activity: Yes     Partners: Female   Lifestyle    Physical activity     Days per week: None     Minutes per session: None    Stress: None   Relationships    Social connections     Talks on phone: None     Gets together: None     Attends Jewish service: None     Active member of club or organization: None     Attends meetings of clubs or organizations: None     Relationship status: None    Intimate partner violence     Fear of current or ex partner: None     Emotionally abused: None     Physically abused: None     Forced sexual activity: None   Other Topics Concern    None   Social History Narrative    None       SCREENINGS    Dago Coma Scale  Eye Opening: Spontaneous  Best Verbal Response: Oriented  Best Motor Response: Obeys commands  Adgo Coma Scale Score: 15        PHYSICAL EXAM    (up to 7 for level 4, 8 or more for level 5)     ED Triage Vitals [03/06/21 1216]   BP Temp Temp Source Pulse Resp SpO2 Height Weight   (!) 169/106 98.9 °F (37.2 °C) Oral 96 20 91 % 6' (1.829 m) 230 lb (104.3 kg)       Physical Exam  Vitals signs and nursing note reviewed. Constitutional:       General: He is not in acute distress. Appearance: He is well-developed. He is not diaphoretic. HENT:      Head: Normocephalic and atraumatic. Eyes:      Pupils: Pupils are equal, round, and reactive to light. Neck:      Musculoskeletal: Normal range of motion and neck supple. Cardiovascular:      Rate and Rhythm: Normal rate and regular rhythm. Heart sounds: Normal heart sounds. Pulmonary:      Effort: Tachypnea and respiratory distress present. Breath sounds: Decreased breath sounds present. Comments: Speaks in short sentences  Abdominal:      General: Bowel sounds are normal. There is no distension. Palpations: Abdomen is soft. Tenderness: There is no abdominal tenderness. Musculoskeletal: Normal range of motion. Skin:     General: Skin is warm and dry. Findings: No rash. Neurological:      Mental Status: He is alert and oriented to person, place, and time. Cranial Nerves: No cranial nerve deficit. Motor: No abnormal muscle tone.       Coordination: Coordination normal.   Psychiatric:         Behavior: Behavior normal.         DIAGNOSTIC RESULTS     EKG: All EKG's are interpreted by the Emergency Department Physician who either signs or Co-signs this chart in the absence of a cardiologist.    NSR rate 90 nonspecific st    RADIOLOGY:   Non-plain film images such as CT, Ultrasound and MRI are read by the radiologist. Mayme Records images are visualized and preliminarily interpreted by the emergency physician with the below findings:        Interpretation per the Radiologist below, if available at the time of this note:    CTA PULMONARY W CONTRAST   Final Result   Impression:   1. No evidence of pulmonary embolus. 2.  Slight progression of bilateral RIGHT greater than LEFT   consolidation, suspicious for pneumonia. 3.  Small bilateral pleural effusions and interstitial pulmonary   edema. Signed by Dr Franklin Coleman on 3/6/2021 1:56 PM      XR CHEST PORTABLE   Final Result   Impression:   Slight increase in bilateral RIGHT greater than LEFT middle and lower   lung zone airspace opacity.    Signed by Dr Franklin Coleman on 3/6/2021 1:14 PM            ED BEDSIDE ULTRASOUND:   Performed by ED Physician - none    LABS:  Labs Reviewed   BLOOD GAS, ARTERIAL - Abnormal; Notable for the following components:       Result Value    pH, Arterial 7.330 (*)     pCO2, Arterial 67.0 (*)     pO2, Arterial 51.0 (*)     HCO3, Arterial 35.3 (*)     Base Excess, Arterial 7.4 (*)     Hemoglobin, Art, Extended 11.3 (*)     O2 Sat, Arterial 84.8 (*)     All other components within normal limits    Narrative:     CALL  [facility] tel. ,  hilary ayon rn, 03/06/2021 12:47, by HÉCTOR   CBC WITH AUTO DIFFERENTIAL - Abnormal; Notable for the following components:    RBC 4.06 (*)     Hemoglobin 11.2 (*)     Hematocrit 37.8 (*)     MCHC 29.6 (*)     RDW 14.8 (*)     Neutrophils % 79.5 (*)     Lymphocytes % 7.1 (*)     Monocytes % 10.8 (*)     Lymphocytes Absolute 0.5 (*)     All other components within normal limits   COMPREHENSIVE METABOLIC PANEL - Abnormal; Notable for the following components:    CO2 34 (*)     Glucose 182 (*)     BUN 29 (*)     CREATININE 1.3 (*)     GFR Non-African American 53 (*)     Alkaline Phosphatase 137 (*)     All other components within normal limits   D-DIMER, QUANTITATIVE - Abnormal; Notable for the following components:    D-Dimer, Quant 1.59 (*)     All other components within normal limits   RESPIRATORY PANEL, MOLECULAR, WITH COVID-19   CULTURE, BLOOD 1   CULTURE, BLOOD 2   CULTURE, RESPIRATORY   APTT   BRAIN NATRIURETIC PEPTIDE   PROTIME-INR   TROPONIN   LACTIC ACID, PLASMA   POTASSIUM, WHOLE BLOOD       All other labs were within normal range or not returned as of this dictation. EMERGENCY DEPARTMENT COURSE and DIFFERENTIALDIAGNOSIS/MDM:   Vitals:    Vitals:    03/06/21 1216 03/06/21 1219   BP: (!) 169/106    Pulse: 96 93   Resp: 20    Temp: 98.9 °F (37.2 °C)    TempSrc: Oral    SpO2: 91%    Weight: 230 lb (104.3 kg)    Height: 6' (1.829 m)        MDM  Slight worsening of his PNA on xray and ct scan. No PE, no abscess. Has been on vanc and cefepime previously. Pt breathing easier on bipap. D/w Dr Jenny Madden for admission  CONSULTS:  [address]:  Unless otherwise notedbelow, none     Procedures    FINAL IMPRESSION     1. Acute on chronic respiratory failure with hypoxia and hypercapnia (HCC)    2. Pneumonia due to organism          DISPOSITION/PLAN   DISPOSITION        PATIENT REFERRED TO:  @FUP@    DISCHARGE MEDICATIONS:  New Prescriptions    No medications on file         CRITICAL CARE TIME   Total Critical Care time was 45 minutes, excluding separately reportable procedures. Time includes direct patient care, reassessing patient, documenting care, and coordinating care for the patient. Time also includes data interpretation of any lab tests or imaging that were performed. There was a high probability of clinically significant/life threatening deterioration in the patient's condition which required my urgent intervention.       (Please note that portions of this note were completed with a voice recognition program.  Efforts were made to edit the dictations butoccasionally words are mis-transcribed.)    Jose Mcclure MD (electronically signed)  AttendingEmergency Physician         Jose Mcclure MD  03/06/21 1027

## 2021-03-07 ENCOUNTER — APPOINTMENT (OUTPATIENT)
Dept: GENERAL RADIOLOGY | Age: 81
DRG: 981 | End: 2021-03-07
Payer: MEDICARE

## 2021-03-07 LAB
ANION GAP SERPL CALCULATED.3IONS-SCNC: 7 MMOL/L (ref 7–19)
BASOPHILS ABSOLUTE: 0 K/UL (ref 0–0.2)
BASOPHILS RELATIVE PERCENT: 0.3 % (ref 0–1)
BUN BLDV-MCNC: 30 MG/DL (ref 8–23)
CALCIUM SERPL-MCNC: 8.2 MG/DL (ref 8.8–10.2)
CHLORIDE BLD-SCNC: 102 MMOL/L (ref 98–111)
CO2: 29 MMOL/L (ref 22–29)
CREAT SERPL-MCNC: 1.3 MG/DL (ref 0.5–1.2)
EOSINOPHILS ABSOLUTE: 0 K/UL (ref 0–0.6)
EOSINOPHILS RELATIVE PERCENT: 0 % (ref 0–5)
GFR AFRICAN AMERICAN: >59
GFR NON-AFRICAN AMERICAN: 53
GLUCOSE BLD-MCNC: 146 MG/DL (ref 70–99)
GLUCOSE BLD-MCNC: 168 MG/DL (ref 70–99)
GLUCOSE BLD-MCNC: 168 MG/DL (ref 74–109)
GLUCOSE BLD-MCNC: 173 MG/DL (ref 70–99)
GLUCOSE BLD-MCNC: 185 MG/DL (ref 70–99)
HCT VFR BLD CALC: 33 % (ref 42–52)
HEMOGLOBIN: 9.9 G/DL (ref 14–18)
IMMATURE GRANULOCYTES #: 0.1 K/UL
LYMPHOCYTES ABSOLUTE: 0.5 K/UL (ref 1.1–4.5)
LYMPHOCYTES RELATIVE PERCENT: 13.1 % (ref 20–40)
MAGNESIUM: 1.6 MG/DL (ref 1.6–2.4)
MCH RBC QN AUTO: 27.6 PG (ref 27–31)
MCHC RBC AUTO-ENTMCNC: 30 G/DL (ref 33–37)
MCV RBC AUTO: 91.9 FL (ref 80–94)
MONOCYTES ABSOLUTE: 0.1 K/UL (ref 0–0.9)
MONOCYTES RELATIVE PERCENT: 4 % (ref 0–10)
NEUTROPHILS ABSOLUTE: 2.8 K/UL (ref 1.5–7.5)
NEUTROPHILS RELATIVE PERCENT: 79.5 % (ref 50–65)
PDW BLD-RTO: 14.4 % (ref 11.5–14.5)
PERFORMED ON: ABNORMAL
PLATELET # BLD: 99 K/UL (ref 130–400)
PMV BLD AUTO: 11.6 FL (ref 9.4–12.4)
POTASSIUM SERPL-SCNC: 5.2 MMOL/L (ref 3.5–5)
RBC # BLD: 3.59 M/UL (ref 4.7–6.1)
SODIUM BLD-SCNC: 138 MMOL/L (ref 136–145)
TROPONIN: 0.02 NG/ML (ref 0–0.03)
TROPONIN: 0.02 NG/ML (ref 0–0.03)
WBC # BLD: 3.5 K/UL (ref 4.8–10.8)

## 2021-03-07 PROCEDURE — 84484 ASSAY OF TROPONIN QUANT: CPT

## 2021-03-07 PROCEDURE — 2580000003 HC RX 258: Performed by: EMERGENCY MEDICINE

## 2021-03-07 PROCEDURE — 36556 INSERT NON-TUNNEL CV CATH: CPT

## 2021-03-07 PROCEDURE — 93005 ELECTROCARDIOGRAM TRACING: CPT | Performed by: EMERGENCY MEDICINE

## 2021-03-07 PROCEDURE — 2700000000 HC OXYGEN THERAPY PER DAY

## 2021-03-07 PROCEDURE — 6370000000 HC RX 637 (ALT 250 FOR IP): Performed by: INTERNAL MEDICINE

## 2021-03-07 PROCEDURE — 2500000003 HC RX 250 WO HCPCS: Performed by: INTERNAL MEDICINE

## 2021-03-07 PROCEDURE — 2580000003 HC RX 258: Performed by: INTERNAL MEDICINE

## 2021-03-07 PROCEDURE — 51798 US URINE CAPACITY MEASURE: CPT

## 2021-03-07 PROCEDURE — 36415 COLL VENOUS BLD VENIPUNCTURE: CPT

## 2021-03-07 PROCEDURE — 6360000002 HC RX W HCPCS: Performed by: INTERNAL MEDICINE

## 2021-03-07 PROCEDURE — 36592 COLLECT BLOOD FROM PICC: CPT

## 2021-03-07 PROCEDURE — 05H533Z INSERTION OF INFUSION DEVICE INTO RIGHT SUBCLAVIAN VEIN, PERCUTANEOUS APPROACH: ICD-10-PCS | Performed by: INTERNAL MEDICINE

## 2021-03-07 PROCEDURE — 82947 ASSAY GLUCOSE BLOOD QUANT: CPT

## 2021-03-07 PROCEDURE — 51701 INSERT BLADDER CATHETER: CPT

## 2021-03-07 PROCEDURE — 85025 COMPLETE CBC W/AUTO DIFF WBC: CPT

## 2021-03-07 PROCEDURE — 80048 BASIC METABOLIC PNL TOTAL CA: CPT

## 2021-03-07 PROCEDURE — 6360000002 HC RX W HCPCS: Performed by: EMERGENCY MEDICINE

## 2021-03-07 PROCEDURE — 71045 X-RAY EXAM CHEST 1 VIEW: CPT

## 2021-03-07 PROCEDURE — 83735 ASSAY OF MAGNESIUM: CPT

## 2021-03-07 PROCEDURE — 6370000000 HC RX 637 (ALT 250 FOR IP): Performed by: HOSPITALIST

## 2021-03-07 PROCEDURE — 2000000000 HC ICU R&B

## 2021-03-07 PROCEDURE — 94640 AIRWAY INHALATION TREATMENT: CPT

## 2021-03-07 PROCEDURE — 02HV33Z INSERTION OF INFUSION DEVICE INTO SUPERIOR VENA CAVA, PERCUTANEOUS APPROACH: ICD-10-PCS | Performed by: INTERNAL MEDICINE

## 2021-03-07 PROCEDURE — 94660 CPAP INITIATION&MGMT: CPT

## 2021-03-07 RX ORDER — ATROPINE SULFATE 1 MG/ML
1 INJECTION, SOLUTION INTRAMUSCULAR; INTRAVENOUS; SUBCUTANEOUS ONCE
Status: DISCONTINUED | OUTPATIENT
Start: 2021-03-07 | End: 2021-03-15

## 2021-03-07 RX ORDER — MAGNESIUM SULFATE IN WATER 40 MG/ML
2000 INJECTION, SOLUTION INTRAVENOUS ONCE
Status: COMPLETED | OUTPATIENT
Start: 2021-03-07 | End: 2021-03-07

## 2021-03-07 RX ORDER — METOPROLOL TARTRATE 5 MG/5ML
5 INJECTION INTRAVENOUS ONCE
Status: COMPLETED | OUTPATIENT
Start: 2021-03-07 | End: 2021-03-07

## 2021-03-07 RX ORDER — DILTIAZEM HYDROCHLORIDE 5 MG/ML
10 INJECTION INTRAVENOUS ONCE
Status: COMPLETED | OUTPATIENT
Start: 2021-03-07 | End: 2021-03-07

## 2021-03-07 RX ORDER — DIGOXIN 0.25 MG/ML
500 INJECTION INTRAMUSCULAR; INTRAVENOUS ONCE
Status: COMPLETED | OUTPATIENT
Start: 2021-03-07 | End: 2021-03-07

## 2021-03-07 RX ORDER — DIGOXIN 125 MCG
0.5 TABLET ORAL ONCE
Status: DISCONTINUED | OUTPATIENT
Start: 2021-03-07 | End: 2021-03-07

## 2021-03-07 RX ORDER — TAMSULOSIN HYDROCHLORIDE 0.4 MG/1
0.4 CAPSULE ORAL 2 TIMES DAILY
Status: DISCONTINUED | OUTPATIENT
Start: 2021-03-07 | End: 2021-03-15 | Stop reason: HOSPADM

## 2021-03-07 RX ADMIN — INSULIN LISPRO 1 UNITS: 100 INJECTION, SOLUTION INTRAVENOUS; SUBCUTANEOUS at 16:00

## 2021-03-07 RX ADMIN — SODIUM CHLORIDE, PRESERVATIVE FREE 10 ML: 5 INJECTION INTRAVENOUS at 08:35

## 2021-03-07 RX ADMIN — ATORVASTATIN CALCIUM 20 MG: 20 TABLET, FILM COATED ORAL at 08:31

## 2021-03-07 RX ADMIN — ENOXAPARIN SODIUM 100 MG: 100 INJECTION SUBCUTANEOUS at 13:45

## 2021-03-07 RX ADMIN — VANCOMYCIN HYDROCHLORIDE 1000 MG: 10 INJECTION, POWDER, LYOPHILIZED, FOR SOLUTION INTRAVENOUS at 15:51

## 2021-03-07 RX ADMIN — AMIODARONE HYDROCHLORIDE 1 MG/MIN: 50 INJECTION, SOLUTION INTRAVENOUS at 08:11

## 2021-03-07 RX ADMIN — DEXTROSE MONOHYDRATE 150 MG: 50 INJECTION, SOLUTION INTRAVENOUS at 08:00

## 2021-03-07 RX ADMIN — METHYLPREDNISOLONE SODIUM SUCCINATE 40 MG: 40 INJECTION, POWDER, FOR SOLUTION INTRAMUSCULAR; INTRAVENOUS at 05:37

## 2021-03-07 RX ADMIN — MEROPENEM 1000 MG: 1 INJECTION, POWDER, FOR SOLUTION INTRAVENOUS at 15:46

## 2021-03-07 RX ADMIN — METOPROLOL TARTRATE 5 MG: 5 INJECTION INTRAVENOUS at 09:10

## 2021-03-07 RX ADMIN — METOPROLOL SUCCINATE 100 MG: 50 TABLET, EXTENDED RELEASE ORAL at 08:31

## 2021-03-07 RX ADMIN — DOXYCYCLINE 100 MG: 100 INJECTION, POWDER, LYOPHILIZED, FOR SOLUTION INTRAVENOUS at 04:31

## 2021-03-07 RX ADMIN — IPRATROPIUM BROMIDE AND ALBUTEROL SULFATE 1 AMPULE: 2.5; .5 SOLUTION RESPIRATORY (INHALATION) at 01:54

## 2021-03-07 RX ADMIN — SODIUM CHLORIDE, PRESERVATIVE FREE 10 ML: 5 INJECTION INTRAVENOUS at 20:42

## 2021-03-07 RX ADMIN — MEROPENEM 1000 MG: 1 INJECTION, POWDER, FOR SOLUTION INTRAVENOUS at 05:37

## 2021-03-07 RX ADMIN — INSULIN LISPRO 1 UNITS: 100 INJECTION, SOLUTION INTRAVENOUS; SUBCUTANEOUS at 08:33

## 2021-03-07 RX ADMIN — TAMSULOSIN HYDROCHLORIDE 0.4 MG: 0.4 CAPSULE ORAL at 20:42

## 2021-03-07 RX ADMIN — DILTIAZEM HYDROCHLORIDE 10 MG/HR: 5 INJECTION INTRAVENOUS at 11:53

## 2021-03-07 RX ADMIN — METHYLPREDNISOLONE SODIUM SUCCINATE 40 MG: 40 INJECTION, POWDER, FOR SOLUTION INTRAMUSCULAR; INTRAVENOUS at 15:46

## 2021-03-07 RX ADMIN — DIGOXIN 500 MCG: 0.25 INJECTION INTRAMUSCULAR; INTRAVENOUS at 09:47

## 2021-03-07 RX ADMIN — IPRATROPIUM BROMIDE AND ALBUTEROL SULFATE 1 AMPULE: 2.5; .5 SOLUTION RESPIRATORY (INHALATION) at 06:16

## 2021-03-07 RX ADMIN — MEROPENEM 1000 MG: 1 INJECTION, POWDER, FOR SOLUTION INTRAVENOUS at 20:42

## 2021-03-07 RX ADMIN — METHYLPREDNISOLONE SODIUM SUCCINATE 40 MG: 40 INJECTION, POWDER, FOR SOLUTION INTRAMUSCULAR; INTRAVENOUS at 20:41

## 2021-03-07 RX ADMIN — HYDRALAZINE HYDROCHLORIDE 25 MG: 25 TABLET, FILM COATED ORAL at 05:38

## 2021-03-07 RX ADMIN — TROSPIUM CHLORIDE 20 MG: 20 TABLET, FILM COATED ORAL at 20:41

## 2021-03-07 RX ADMIN — IPRATROPIUM BROMIDE AND ALBUTEROL SULFATE 1 AMPULE: 2.5; .5 SOLUTION RESPIRATORY (INHALATION) at 14:11

## 2021-03-07 RX ADMIN — MAGNESIUM SULFATE HEPTAHYDRATE 2000 MG: 40 INJECTION, SOLUTION INTRAVENOUS at 07:57

## 2021-03-07 RX ADMIN — INSULIN LISPRO 1 UNITS: 100 INJECTION, SOLUTION INTRAVENOUS; SUBCUTANEOUS at 20:36

## 2021-03-07 RX ADMIN — INSULIN LISPRO 1 UNITS: 100 INJECTION, SOLUTION INTRAVENOUS; SUBCUTANEOUS at 11:25

## 2021-03-07 RX ADMIN — AMIODARONE HYDROCHLORIDE 0.5 MG/MIN: 50 INJECTION, SOLUTION INTRAVENOUS at 16:51

## 2021-03-07 RX ADMIN — IPRATROPIUM BROMIDE AND ALBUTEROL SULFATE 1 AMPULE: 2.5; .5 SOLUTION RESPIRATORY (INHALATION) at 22:02

## 2021-03-07 RX ADMIN — TAMSULOSIN HYDROCHLORIDE 0.4 MG: 0.4 CAPSULE ORAL at 08:31

## 2021-03-07 RX ADMIN — NIFEDIPINE 60 MG: 60 TABLET, FILM COATED, EXTENDED RELEASE ORAL at 08:31

## 2021-03-07 RX ADMIN — ASPIRIN 81 MG: 81 TABLET, FILM COATED ORAL at 08:31

## 2021-03-07 RX ADMIN — AMIODARONE HYDROCHLORIDE 0.5 MG/MIN: 50 INJECTION, SOLUTION INTRAVENOUS at 22:40

## 2021-03-07 RX ADMIN — DILTIAZEM HYDROCHLORIDE 10 MG: 5 INJECTION INTRAVENOUS at 11:49

## 2021-03-07 RX ADMIN — DOXYCYCLINE 100 MG: 100 INJECTION, POWDER, LYOPHILIZED, FOR SOLUTION INTRAVENOUS at 16:59

## 2021-03-07 RX ADMIN — PANTOPRAZOLE SODIUM 40 MG: 40 TABLET, DELAYED RELEASE ORAL at 08:31

## 2021-03-07 ASSESSMENT — PAIN SCALES - GENERAL
PAINLEVEL_OUTOF10: 0

## 2021-03-07 ASSESSMENT — ENCOUNTER SYMPTOMS
EYES NEGATIVE: 1
SHORTNESS OF BREATH: 1
GASTROINTESTINAL NEGATIVE: 1
ALLERGIC/IMMUNOLOGIC NEGATIVE: 1

## 2021-03-07 NOTE — CONSULTS
[id number] [facility]  Cardiology Consult      Requesting MD:  Elmer Goodpasture, MD   Admit Status:  Inpatient [101]       History obtained from:   [] Patient  [] Other (specify):     Patient:  Giacomo Zhang  [id number]     Chief Complaint:   Chief Complaint   Patient presents with    Shortness of Breath     Pt presents to ED with c/o SOA. States he is usually on 1 L and had a pneumonia diagnosis in [facility]          HPI:  67-year-old male with past medical history as listed below presented to ER with shortness of breath. Multiple prior admissions for the same. Required intubation in 1/2021. Prior tobacco dependence. History of COPD. Also complains of low-grade fevers. Found to have afib RVR with aberrant conduction. Review of Systems:  Review of Systems   Constitutional: Positive for fatigue. HENT: Negative. Eyes: Negative. Respiratory: Positive for shortness of breath. Cardiovascular: Positive for palpitations. Gastrointestinal: Negative. Endocrine: Negative. Genitourinary: Negative. Musculoskeletal: Negative. Allergic/Immunologic: Negative. Neurological: Negative. Hematological: Negative. Psychiatric/Behavioral: Negative. All other systems reviewed and are negative.       Cardiac Specific Data:  Specialty Problems        Cardiology Problems    Atherosclerosis of native artery of both lower extremities with intermittent claudication (HCC)        Bilateral carotid artery stenosis        Carotid stenosis, asymptomatic, left        Essential hypertension        Pure hypercholesterolemia        CHF (congestive heart failure) (HCC)              Past Medical History:  Past Medical History:   Diagnosis Date    Acute liver failure without hepatic coma 10/23/2018    Back pain     \"with tired legs as a result\"    Bladder cancer ([address] 12/19/2018    Blood circulation, collateral     Carotid arterial disease ([address]     recent surgery    CKD (chronic kidney disease), 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/2018    COPD with acute lower respiratory infection ([facility] 2/24/2021    GERD (gastroesophageal reflux disease)     Hyperlipidemia     Hypertension     Hypertension     Palliative care patient 10/23/2018    Pneumonia due to infectious organism 11/06/2018    Primary osteoarthritis of left knee 10/14/2018    PVD (peripheral vascular disease) ([facility]     Tremor     Tremor on Right side x 1-2 weeks per stepdaughter    Type 2 diabetes mellitus with complication, without long-term current use of insulin ([facility] 1/21/2021        Past Surgical History:  Past Surgical History:   Procedure Laterality Date    BACK SURGERY      COLONOSCOPY  2007?  CYSTOSCOPY Bilateral 12/19/2018    CYSTOSCOPY, BIOSPY FULGURATION OF BLADDER TUMOR POSSIBLE TURBT, RETROGRADE PYELOGRAM performed by Natacha Mauricio MD at [facility] COLONOSCOPY FLX DX W/COLLJ SPEC WHEN PFRMD N/A 9/11/2017    Dr Albert Albarado internal hemorrhoids, diverticular disease-HP-No recall (age)   [employer] CT REVISE MEDIAN N/CARPAL TUNNEL SURG Left 7/18/2018    OPEN CARPAL TUNNEL RELEASE performed by Lexie Da Silva MD at [address] Left 8/28/2018    LEFT CAROTID ENDARTERECTOMY WITH VEIN PATCH ANGIOPLASTY AND COMPLETION ANGIOGRAM performed by Antony Schwartz MD at [place] [address] Left 10/15/2018    LEFT COMPLEX TOTAL KNEE ARTHROPLASTY performed by Lexie Da Silva MD at [facility] VASCULAR SURGERY  04/21/2015    Merary BESS Ultrasound guided access of left common femoral artery. Aortogram.Diagnostic right lower extremity arteriogram.Radiologic supervision and interpretation.  VASCULAR SURGERY  01/13/2015    Merary Gore M.D Atherectomy,angioplasty,and stenting of left superficial femoral artery.  VASCULAR SURGERY  03/11/2014    Merary Gore M.D. Ultrasound-guided access of right common femoral artery. Aortogram.Left lower extremity arteriogram.Atherectomy and angioplasty of left superficial femoral artery. Radiologic supervision and interpretation.  VASCULAR SURGERY  2013    Jae BESS Aortogram.Multistation arteriogram right lower extremity. Laser atherectomy and angioplasty of right superficial femoral artery. Selective catheterization of right tibioperoneal trunk. Angioplasty of peroneal artery and tibioperoneal trunk.  VASCULAR SURGERY  10/30/2018    [facility]. Ultrasound guided cannulation of right internal vein. Placement of right internal jugular vein tunneled dialysis catheter bard equistream xk 23cm tip to cuff    VASCULAR SURGERY  2018    SJS. Removal of tunneled dilaysis catheter right internal jugular vein. Past Family History:  Family History   Problem Relation Age of Onset    Colon Cancer Father     Diabetes Brother     Colon Polyps Neg Hx     Liver Cancer Neg Hx     Liver Disease Neg Hx     Esophageal Cancer Neg Hx     Rectal Cancer Neg Hx     Stomach Cancer Neg Hx        Past Social History:  Social History     Socioeconomic History    Marital status:      Spouse name: Not on file    Number of children: Not on file    Years of education: Not on file    Highest education level: Not on file   Occupational History    Not on file   Social Needs    Financial resource strain: Not on file    Food insecurity     Worry: Not on file     Inability: Not on file    Transportation needs     Medical: Not on file     Non-medical: Not on file   Tobacco Use    Smoking status: Former Smoker     Quit date: 6/3/2003     Years since quittin.7    Smokeless tobacco: Never Used   Substance and Sexual Activity    Alcohol use:  Yes     Alcohol/week: 12.0 standard drinks     Types: 12 Glasses of wine per week     Comment: 2 glasses of wine every night    Drug use: No    Sexual activity: Yes     Partners: Female   Lifestyle    Physical activity     Days per week: Not on file     Minutes per session: Not on file    Stress: Not on file   Relationships    Social connections     Talks on phone: Not on file     Gets together: Not on file     Attends Denominational service: Not on file     Active member of club or organization: Not on file     Attends meetings of clubs or organizations: Not on file     Relationship status: Not on file    Intimate partner violence     Fear of current or ex partner: Not on file     Emotionally abused: Not on file     Physically abused: Not on file     Forced sexual activity: Not on file   Other Topics Concern    Not on file   Social History Narrative    Not on file       Allergies: Allergies   Allergen Reactions    Eliquis [Apixaban] Other (See Comments)     \"almost bled to death\"    Promethazine Hcl Other (See Comments)     He became encephalopathic for several hours and was not responsive. Home Meds:  Prior to Admission medications    Medication Sig Start Date End Date Taking?  Authorizing Provider   calcium carbonate (TUMS) 500 MG chewable tablet Take 1 tablet by mouth 3 times daily as needed for Heartburn 2/24/21 3/26/21  Gulshan Cuellar, DO   hydrALAZINE (APRESOLINE) 25 MG tablet Take 1 tablet by mouth every 8 hours 2/24/21   [facility], DO   guaiFENesin (ROBITUSSIN) 100 MG/5ML syrup Take 10 mLs by mouth every 4 hours as needed for Cough 2/24/21   [facility], DO   famotidine (PEPCID) 20 MG tablet Take 1 tablet by mouth daily 2/24/21   Gulshan Cuellar, DO   aspirin 81 MG EC tablet Take 1 tablet by mouth daily 1/30/21   Laila Hoyos MD   glipiZIDE (GLUCOTROL) 5 MG tablet Take 1 tablet by mouth every morning (before breakfast) 1/30/21   Laila Hoyos MD   atorvastatin (LIPITOR) 20 MG tablet Take 1 tablet by mouth daily 1/29/21   Laila Hoyos MD   metoprolol succinate (TOPROL XL) 100 MG extended release tablet Take 1 tablet by mouth daily 1/30/21   Laila Hoyos MD   NIFEdipine (ADALAT CC) 60 MG extended release tablet Take 1 tablet by mouth daily 1/30/21   Laila Hoyos MD   tamsulosin (FLOMAX) 0.4 MG capsule Take 1 capsule by mouth daily 1/30/21   Ayanna Woodward MD   pantoprazole (PROTONIX) 40 MG tablet Take 1 tablet by mouth daily 1/30/21   Ayanna Woodward MD   trospium (SANCTURA) 20 MG tablet Take 1 tablet by mouth nightly 1/29/21   Ayanna Woodward MD       Current Meds:   tamsulosin  0.4 mg Oral BID    magnesium sulfate  2,000 mg Intravenous Once    enoxaparin  1 mg/kg Subcutaneous BID    digoxin  500 mcg Intravenous Once    meropenem (MERREM) 1000 mg in SWFI 20 mL IV syringe  1,000 mg Intravenous Q8H    vancomycin (VANCOCIN) intermittent dosing (placeholder)   Other RX Placeholder    vancomycin  1,000 mg Intravenous Q24H    doxycycline (VIBRAMYCIN) IV  100 mg Intravenous Q12H    ipratropium-albuterol  1 ampule Inhalation Q4H    methylPREDNISolone  40 mg Intravenous Q8H    aspirin  81 mg Oral Daily    atorvastatin  20 mg Oral Daily    insulin lispro  0-6 Units Subcutaneous TID WC    insulin lispro  0-3 Units Subcutaneous Nightly    hydrALAZINE  25 mg Oral 3 times per day    metoprolol succinate  100 mg Oral Daily    NIFEdipine  60 mg Oral Daily    pantoprazole  40 mg Oral Daily    trospium  20 mg Oral Nightly    sodium chloride flush  10 mL Intravenous 2 times per day       Current Infused Meds:   amiodarone 1 mg/min (03/07/21 0811)    Followed by   Vashti Officer amiodarone      dextrose         Physical Exam:  Vitals:    03/07/21 0900   BP: (!) 109/53   Pulse: 147   Resp: 19   Temp:    SpO2: (!) 89%       Intake/Output Summary (Last 24 hours) at 3/7/2021 0946  Last data filed at 3/7/2021 0600  Gross per 24 hour   Intake 1146.67 ml   Output 350 ml   Net 796.67 ml     Estimated body mass index is 31.17 kg/m² as calculated from the following:    Height as of this encounter: 6' (1.829 m). Weight as of this encounter: 229 lb 12.8 oz (104.2 kg). Physical Exam  Vitals signs reviewed. Constitutional:       Appearance: He is ill-appearing. HENT:      Head: Normocephalic. Right Ear: Tympanic membrane normal.      Nose: Nose normal.      Mouth/Throat:      Mouth: Mucous membranes are moist.   Eyes:      Pupils: Pupils are equal, round, and reactive to light. Neck:      Musculoskeletal: Normal range of motion. Cardiovascular:      Rate and Rhythm: Rhythm irregular. Pulses: Normal pulses. Heart sounds: Normal heart sounds. Pulmonary:      Effort: Pulmonary effort is normal.      Breath sounds: Wheezing present. Abdominal:      General: Abdomen is flat. Musculoskeletal: Normal range of motion. Skin:     General: Skin is warm. Intubated. Labs:  Recent Labs     03/06/21  1231 03/07/21  0306   WBC 6.5 3.5*   HGB 11.2* 9.9*    99*       Recent Labs     03/06/21  1231 03/06/21  1243 03/07/21  0306     --  138   K 4.4 4.1 5.2*     --  102   CO2 34*  --  29   BUN 29*  --  30*   CREATININE 1.3*  --  1.3*   LABGLOM 53*  --  53*   MG  --   --  1.6   CALCIUM 9.2  --  8.2*       CK, CKMB, Troponin: @LABRCNT (CKTOTAL:3, CKMB:3, TROPONINI:3)@    Last 3 BNP:  No results for input(s): BNP in the last 72 hours. IMAGING:  Echo Complete 2d W Doppler W Color    Result Date: 2/14/2021  Transthoracic Echocardiography Report (TTE)  Demographics   Patient Name  Rolly Nielsen  Date of Study         02/14/2021                E   MRN           [id number]            Gender                Male   Date of Birth 1940        Room Number           MHL-0711   Age           79 year(s)   Height:       72 inches         Referring Physician   lesley argueta   Weight:       232.01 pounds     Sonographer           PATRICE Abernathy   BSA:          2.27 m^2          Interpreting          Nacho Cantrell MD                                  Physician   BMI:          31.47 kg/m^2  Procedure Type of Study   TTE procedure:ECHO NO CONTRAST WITH DOP/COLR.   Study Location: Portable Technical Quality: Adequate visualization Patient Status: Inpatient BP: 112/69 mmHg Indications:Elevated Troponin. Conclusions   Signature   ----------------------------------------------------------------  Electronically signed by Ra Mojica MD(Interpreting physician)  on 02/14/2021 02:35 PM  ----------------------------------------------------------------   Findings   Mitral Valve  moderate mitral regurgitation is present. Aortic Valve  Structurally normal aortic valve. Tricuspid Valve  Mild tricuspid regurgitation. RVSP cannot be determined accurately   Pulmonic Valve  The pulmonic valve was not well visualized . Left Atrium  Mildly dilated left atrium. Left Ventricle  Normal left ventricular size with preserved LV function and an estimated  ejection fraction of approximately 60-65%. Grade 2 diastolic dysfunction with increased LA filling pressure   Right Atrium  Normal right atrial dimension with no evidence of thrombus or mass noted. Right Ventricle  Normal right ventricular size with preserved RV function. Miscellaneous  IVC not studied  M-Mode Measurements (cm)   LVIDd: 4.9 cm                        LVIDs: 3.3 cm  IVSd: 1.58 cm  LVPWd: 1.37 cm                       AO Root Dimension: 2.6 cm  % Ejection Fraction: 61 %            LA: 3.8 cm                                       LVOT: 2.2 cm  Doppler Measurements:   AV Peak Gradient: 9.73 mmHg        MV Peak E-Wave: 107 cm/s                                     MV Peak A-Wave: 97.3 cm/s  TR Velocity:197 cm/s               MV E/A Ratio: 1.1 %  TR Gradient:15.52 mmHg             MV Peak Gradient: 4.58 mmHg  Estimated RAP:5 mmHg               MV P1/2t: 47 msec  RVSP:21 mmHg                       MVA by PHT4.68 cm^2      Ct Head Wo Contrast    Result Date: 2/18/2021  Examination. CT HEAD WO CONTRAST 2/18/2021 12:57 PM History: The patient complains of dizziness. DLP: 760 mGycm. The CT scan of the head is performed without intravenous contrast enhancement. The images are acquired in axial plane with subsequent reconstruction in coronal and sagittal planes. The comparison is made with the previous study dated 1/18/2021. No significant interval change. There is no evidence of a mass. No midline shift. There is no evidence of intracranial hemorrhage or hematoma. Moderately prominent ventricles, basal cisterns cortical sulci are similar to the previous study suggesting moderate chronic volume loss. There are extensive chronic white matter ischemic changes in the centrum semiovale bilaterally. The gray-white matter differentiation is maintained. The images reviewed in bone window show no acute bony abnormality. The visualized paranasal sinuses and mastoid air cells are unremarkable. There are severe atheromatous changes of the intracranial internal carotid arteries bilaterally. The orbits are unremarkable. No acute intracranial abnormality. Moderate cerebral cortical atrophy. Extensive chronic white matter ischemic changes. Signed by Dr Pravin Dominguez on 2/18/2021 2:23 PM    Ct Chest Wo Contrast    Result Date: 2/13/2021  EXAMINATION: CT CHEST WO CONTRAST 2/13/2021 2:17 PM HISTORY: Shortness of breath, bilateral pneumonia. DOSE: 976 mGycm. Automatic exposure control was utilized in an effort to use as little radiation as possible, without compromising image quality. REPORT: Spiral CT of the chest was performed without contrast, reconstructed coronal and sagittal images are also reviewed. COMPARISON: Portable chest x-ray to 12/20/2021. Review of lung windows demonstrates consolidative infiltrates in both lungs, on the right, this includes extensive infiltrate surrounding the right hilum, with contiguous infiltrate in the right upper lobe, with air bronchograms, there is also consolidation of the right lower lobe with air bronchograms and focal subpleural nodular infiltrates are identified in the right middle lobe and right upper lobe focally.  In the left lung, there is a subpleural infiltrate in the left upper lobe along the major fissure, with mild paraseptal emphysematous changes. This is contiguous with mixed groundglass and consolidative infiltrates in the lingula and there is consolidation of the left lower lobe with air bronchograms, with associated bronchial wall thickening. A small amount of left perihilar infiltrate is also present. No pneumothorax is identified, there is a trace left pleural effusion, small right pleural effusion. No lung abscess or cavitation is seen in association with the areas of pneumonia. Soft tissue windows show a 12 mm low-attenuation lesion in the right thyroid lobe, which is most likely benign. There are normal-sized shotty appearing lymph nodes within the mediastinum without measurable lymphadenopathy. A small amount calcified plaques noted within the aortic arch and there is heavy calcified plaque within the coronary arteries. Heart size is normal. There is trace pericardial fluid. The visualized upper abdomen shows no acute abnormality. There is mild bilateral gynecomastia. Review of bone windows shows advanced degenerative changes in the visualized upper lumbar spine. Consolidating infiltrates within both lungs, greatest in the right lower lobe and most compatible with bilateral pneumonia, there is a small right pleural effusion and trace left pleural effusion. No pneumothorax is identified. There is no lung abscess or areas of cavitation. Mild underlying changes of paraseptal emphysema are noted. No measurable intrathoracic lymphadenopathy is identified. Signed by Dr Grey Barrera on 2/13/2021 2:28 PM    Us Renal Complete    Result Date: 2/13/2021  EXAMINATION: US RENAL COMPLETE 2/13/2021 3:16 PM HISTORY: Acute kidney injury. Report: Sonographic images of the kidneys were obtained. COMPARISON: CT of the abdomen and pelvis with without contrast 7/31/2020. Ultrasound of the kidneys 10/23/2018. The right kidney measures 11.6 x 5.5 x 6.0 cm and has normal cortical echogenicity, with a cortical thickness that averages 12 mm.  No Atherosclerotic calcifications are seen in the aorta. Bilateral airspace opacities are present with relative sparing of the upper lobes, markedly increased compared to the prior exam. There is no appreciable pneumothorax or definite pleural effusion. Bilateral pneumonia, though worse when compared to the prior exam. Follow-up PA and lateral chest radiograph to recommended in 6-8 weeks to document resolution. Signed by Dr Alonzo Masterson on 2/12/2021 4:14 PM    Cta Pulmonary W Contrast    Result Date: 3/6/2021  Exam: CT chest angiography with 3D MIP images with IV contrast - 3/6/2021 12:33 PM Indication: Worsening respiratory function, history of pneumonia Comparison: 2/13/2021 DLP: 927 mGy cm. In order to have a CT radiation dose as low as reasonably achievable, Automated Exposure Control was utilized for adjustment of the mA and/or KV according to patient size. Findings: No evidence of pulmonary embolus. Main pulmonary artery is upper limits of normal in caliber. Thoracic aorta is nonaneurysmal. Atherosclerotic change in the aortic arch and coronary arteries noted. No pericardial effusion. Central airways are clear. Interlobular septal thickening. Slight interval worsening of multifocal bilateral consolidation and groundglass opacity. Disease burden is greatest in the RIGHT upper lobe. Small RIGHT greater than LEFT pleural effusions with adjacent atelectasis. No pneumothorax. Mild mediastinal lymphadenopathy similar to prior and likely reactive. 1.3 cm RIGHT thyroid nodule. No acute chest wall soft tissue abnormality. No acute osseous finding. Thoracic spine is scoliotic curvature and degenerative change. No acute osseous finding. Impression: 1. No evidence of pulmonary embolus. 2.  Slight progression of bilateral RIGHT greater than LEFT consolidation, suspicious for pneumonia. 3.  Small bilateral pleural effusions and interstitial pulmonary edema.  Signed by Dr Cal Batista on 3/6/2021 1:56 PM Assessment:  1. Bilateral pneumonia  2. A/c resp failure  3. A fib RVR  4. Normal EF      Recommendations:  Rate control with dig/amio/lopressor  anticoag with lovenox for stroke prophylaxis.     D/w primary team.

## 2021-03-07 NOTE — PROGRESS NOTES
Hospitalist Progress Note  [facility]     Patient: Tonia Lomax  : 1940  MRN: [id number]  Code Status: Full Code    Hospital Day: 1   Date of Service: 3/7/2021    Subjective:   Pt seen and examined. Episodes of NSVT. Asymptomatic. Past Medical History:   Diagnosis Date    Acute liver failure without hepatic coma 10/23/2018    Back pain     \"with tired legs as a result\"    Bladder cancer ([facility] 2018    Blood circulation, collateral     Carotid arterial disease (HCC)     recent surgery    CKD (chronic kidney disease), 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/2018    COPD with acute lower respiratory infection ([address] 2021    GERD (gastroesophageal reflux disease)     Hyperlipidemia     Hypertension     Hypertension     Palliative care patient 10/23/2018    Pneumonia due to infectious organism 2018    Primary osteoarthritis of left knee 10/14/2018    PVD (peripheral vascular disease) (HCC)     Tremor     Tremor on Right side x 1-2 weeks per stepdaughter    Type 2 diabetes mellitus with complication, without long-term current use of insulin ([facility] [address] 2021       Past Surgical History:   Procedure Laterality Date    BACK SURGERY      COLONOSCOPY  ?     CYSTOSCOPY Bilateral 2018    CYSTOSCOPY, BIOSPY FULGURATION OF BLADDER TUMOR POSSIBLE TURBT, RETROGRADE PYELOGRAM performed by Kamila Georges MD at [address] COLONOSCOPY FLX DX W/COLLJ SPEC WHEN PFRMD N/A 2017    Dr Quyen Bustamante internal hemorrhoids, diverticular disease-HP-No recall (age)   Roxana Berger [place] REVISE MEDIAN N/CARPAL TUNNEL SURG Left 2018    OPEN CARPAL TUNNEL RELEASE performed by Miguel Winkler MD at [address] Left 2018    LEFT CAROTID ENDARTERECTOMY WITH VEIN PATCH ANGIOPLASTY AND COMPLETION ANGIOGRAM performed by [facility] Parrish MD at [facility] Left 10/15/2018    LEFT COMPLEX TOTAL KNEE ARTHROPLASTY performed by Rosi Diane Phil Leiva MD at [facility] VASCULAR SURGERY  04/21/2015    Chastity BESS Ultrasound guided access of left common femoral artery. Aortogram.Diagnostic right lower extremity arteriogram.Radiologic supervision and interpretation.  VASCULAR SURGERY  01/13/2015    Chastity Purcell M.D Atherectomy,angioplasty,and stenting of left superficial femoral artery.  VASCULAR SURGERY  03/11/2014    Chastity Purcell M.D. Ultrasound-guided access of right common femoral artery. Aortogram.Left lower extremity arteriogram.Atherectomy and angioplasty of left superficial femoral artery. Radiologic supervision and interpretation.  VASCULAR SURGERY  01/18/2013    Chastity BESS Aortogram.Multistation arteriogram right lower extremity. Laser atherectomy and angioplasty of right superficial femoral artery. Selective catheterization of right tibioperoneal trunk. Angioplasty of peroneal artery and tibioperoneal trunk.  VASCULAR SURGERY  10/30/2018    SJS. Ultrasound guided cannulation of right internal vein. Placement of right internal jugular vein tunneled dialysis catheter bard equistream xk 23cm tip to cuff    VASCULAR SURGERY  12/17/2018    SJS. Removal of tunneled dilaysis catheter right internal jugular vein.        Family History   Problem Relation Age of Onset    Colon Cancer Father     Diabetes Brother     Colon Polyps Neg Hx     Liver Cancer Neg Hx     Liver Disease Neg Hx     Esophageal Cancer Neg Hx     Rectal Cancer Neg Hx     Stomach Cancer Neg Hx        Social History     Socioeconomic History    Marital status:      Spouse name: Not on file    Number of children: Not on file    Years of education: Not on file    Highest education level: Not on file   Occupational History    Not on file   Social Needs    Financial resource strain: Not on file    Food insecurity     Worry: Not on file     Inability: Not on file    Transportation needs     Medical: Not on file Non-medical: Not on file   Tobacco Use    Smoking status: Former Smoker     Quit date: 6/3/2003     Years since quittin.7    Smokeless tobacco: Never Used   Substance and Sexual Activity    Alcohol use:  Yes     Alcohol/week: 12.0 standard drinks     Types: 12 Glasses of wine per week     Comment: 2 glasses of wine every night    Drug use: No    Sexual activity: Yes     Partners: Female   Lifestyle    Physical activity     Days per week: Not on file     Minutes per session: Not on file    Stress: Not on file   Relationships    Social connections     Talks on phone: Not on file     Gets together: Not on file     Attends Church service: Not on file     Active member of club or organization: Not on file     Attends meetings of clubs or organizations: Not on file     Relationship status: Not on file    Intimate partner violence     Fear of current or ex partner: Not on file     Emotionally abused: Not on file     Physically abused: Not on file     Forced sexual activity: Not on file   Other Topics Concern    Not on file   Social History Narrative    Not on file       Current Facility-Administered Medications   Medication Dose Route Frequency Provider Last Rate Last Admin    tamsulosin (FLOMAX) capsule 0.4 mg  0.4 mg Oral BID Ale Agustin MD        amiodarone (CORDARONE) 150 mg in dextrose 5 % 100 mL bolus  150 mg Intravenous Once Promise Jiang  mL/hr at 21 0800 150 mg at 21 0800    Followed by   Kelsea Reagan amiodarone (CORDARONE) 450 mg in dextrose 5 % 250 mL infusion  1 mg/min Intravenous Continuous Promise Jiang MD        Followed by   Kelsea Reagan amiodarone (CORDARONE) 450 mg in dextrose 5 % 250 mL infusion  0.5 mg/min Intravenous Continuous Promise Jiang MD        magnesium sulfate 2000 mg in 50 mL IVPB premix  2,000 mg Intravenous Once Promise Jiang MD 25 mL/hr at 21 0757 2,000 mg at 21 0757    meropenem (MERREM) 1,000 mg in sterile water 20 mL IV syringe  1,000 mg Intravenous Rose Monroe MD   1,000 mg at 03/07/21 0537    vancomycin (VANCOCIN) intermittent dosing (placeholder)   Other RX Placeholder Linnea Moore MD        vancomycin (VANCOCIN) 1000 mg in dextrose 5% 250 mL IVPB  1,000 mg Intravenous Q24H Linnea Moore MD        doxycycline (VIBRAMYCIN) 100 mg in dextrose 5 % 100 mL IVPB  100 mg Intravenous Q12H Joanna Bhatti MD   Stopped at 03/07/21 0531    labetalol (NORMODYNE;TRANDATE) injection 20 mg  20 mg Intravenous Q4H PRN Joanna Bhatti MD        ipratropium-albuterol (DUONEB) nebulizer solution 1 ampule  1 ampule Inhalation Q4H Joanna Bhatti MD   1 ampule at 03/07/21 0616    methylPREDNISolone sodium (SOLU-MEDROL) injection 40 mg  40 mg Intravenous Q8H Joanna Bhatti MD   40 mg at 03/07/21 0537    aspirin EC tablet 81 mg  81 mg Oral Daily Joanna Bhatti MD   81 mg at 03/06/21 1724    atorvastatin (LIPITOR) tablet 20 mg  20 mg Oral Daily Joanna Bhatti MD   20 mg at 03/06/21 1724    calcium carbonate (TUMS) chewable tablet 500 mg  500 mg Oral TID PRN Joanna Bhatti MD        insulin lispro (HUMALOG) injection vial 0-6 Units  0-6 Units Subcutaneous TID WC Joanna Bhatti MD   2 Units at 03/06/21 1708    insulin lispro (HUMALOG) injection vial 0-3 Units  0-3 Units Subcutaneous Nightly Joanna Bhatti MD   1 Units at 03/06/21 2129    guaiFENesin (ROBITUSSIN) 100 MG/5ML liquid 200 mg  200 mg Oral Q4H PRN Joanna Bhatti MD        hydrALAZINE (APRESOLINE) tablet 25 mg  25 mg Oral 3 times per day Joanna Bhatti MD   25 mg at 03/07/21 0538    metoprolol succinate (TOPROL XL) extended release tablet 100 mg  100 mg Oral Daily Joanna Bhatti MD   100 mg at 03/06/21 1723    NIFEdipine (PROCARDIA XL) extended release tablet 60 mg  60 mg Oral Daily Joanna Bhatti MD   60 mg at 03/06/21 1726    pantoprazole (PROTONIX) tablet 40 mg  40 mg Oral Daily Joanna Bhatti MD   40 mg at 03/06/21 1723    trospium (SANCTURA) tablet 20 mg  20 mg Oral Nightly Joanna Bhatti MD   20 mg at 03/06/21 2129    sodium chloride flush 0.9 % injection 10 mL  10 mL Intravenous 2 times per day Ana Maria Ramírez MD        sodium chloride flush 0.9 % injection 10 mL  10 mL Intravenous PRN Ana Maria Ramírez MD        enoxaparin (LOVENOX) injection 40 mg  40 mg Subcutaneous Q24H Ana Maria Ramírez MD   40 mg at 03/06/21 1704    polyethylene glycol (GLYCOLAX) packet 17 g  17 g Oral Daily PRN Ana Maria Ramírez MD        acetaminophen (TYLENOL) tablet 650 mg  650 mg Oral Q6H PRN Ana Maria Ramírez MD        Or    acetaminophen (TYLENOL) suppository 650 mg  650 mg Rectal Q6H PRN Ana Maria Ramírez MD        glucose (GLUTOSE) 40 % oral gel 15 g  15 g Oral PRN Ana Maria Ramírez MD        dextrose 50 % IV solution  12.5 g Intravenous PRN Ana Maria Ramírez MD        glucagon (rDNA) injection 1 mg  1 mg Intramuscular PRN Ana Maria Ramírez MD        dextrose 5 % solution  100 mL/hr Intravenous PRN Ana Maria Ramírez MD             amiodarone      Followed by   [facility] amiodarone      dextrose          Objective:   /60   Pulse 64   Temp 97.1 °F (36.2 °C) (Temporal)   Resp 20   Ht 6' (1.829 m)   Wt 229 lb 12.8 oz (104.2 kg)   SpO2 95%   BMI 31.17 kg/m²     General: no acute distress  HEENT: normocephalic, atraumatic  Neck: supple, symmetrical, trachea midline   Lungs: bilateral rhonchi  Cardiovascular: s1 and s2 normal  Abdomen: soft, positive bowel sounds  Extremities: no cyanosis   Neuro: no acute focal motor deficits   Skin: normal color and texture    Recent Labs     03/06/21  1231 03/07/21  0306   WBC 6.5 3.5*   RBC 4.06* 3.59*   HGB 11.2* 9.9*   HCT 37.8* 33.0*   MCV 93.1 91.9   MCH 27.6 27.6   MCHC 29.6* 30.0*    99*     Recent Labs     03/06/21  1231 03/06/21  1243 03/07/21  0306     --  138   K 4.4 4.1 5.2*   ANIONGAP 7  --  7     --  102   CO2 34*  --  29   BUN 29*  --  30*   CREATININE 1.3*  --  1.3*   GLUCOSE 182*  --  168*   CALCIUM 9.2  --  8.2*     Recent Labs     03/07/21  0306   MG 1.6     Recent Labs similar to prior and likely reactive. 1.3 cm RIGHT thyroid nodule. No acute chest wall soft tissue abnormality. No acute osseous finding. Thoracic spine is scoliotic curvature and degenerative change. No acute osseous finding. Impression: 1. No evidence of pulmonary embolus. 2.  Slight progression of bilateral RIGHT greater than LEFT consolidation, suspicious for pneumonia. 3.  Small bilateral pleural effusions and interstitial pulmonary edema. Signed by Dr Angie Eduardo on 3/6/2021 1:56 PM       Assessment and Plan:   Recurrent versus nonresolving CAP with high risk for MDR pathogens  Multiple prior admissions for the same, required intubation 1/2021  CTA chest 3/6/2021: Bilateral pneumonia, no PE  Broad-spectrum antibiotics  Follow cultures  Lactate 0.6  Molecular respiratory panel negative  Pulmonary consulted for potential bronch to further eval and/or rule out postobstructive process etc     COPD exacerbation  Steroids  Nebs  Antibiotics  Goal sats 88-92%     Acute hypoxemic and hypercapnic respiratory failure  Secondary to above processes  BiPAP 12/6  Follow ABG/CXR  Wean as tolerated  Oximetry studies as warranted    NSVT  Multiple episodes this a.m.   Asymptomatic  Hemodynamically stable  Alert and oriented x3  Amiodarone gtt  Metoprolol/nifedipine already on board  Shock as warranted  Follow lytes  Serial troponin  Follow EKG  Continue telemetry monitoring  Cardiology consulted    Chronically elevated troponin  Denies chest pain  Current level lower than multiple prior levels    Prior tobacco dependence  Counseled on continued cessation     CKD 3  Follow renal function/urine output  Avoid offending agents     DM2  Meds on board     DVT prophylaxis  Lovenox    Total critical care time: 55 minutes    Margot Roblero MD   3/7/2021 8:05 AM

## 2021-03-07 NOTE — PLAN OF CARE
Problem: Skin Integrity:  Goal: Will show no infection signs and symptoms  Description: Will show no infection signs and symptoms  Outcome: Ongoing  Goal: Absence of new skin breakdown  Description: Absence of new skin breakdown  Outcome: Ongoing     Problem: Infection:  Goal: Will remain free from infection  Description: Will remain free from infection  Outcome: Ongoing     Problem: Safety:  Goal: Free from accidental physical injury  Description: Free from accidental physical injury  Outcome: Ongoing  Goal: Free from intentional harm  Description: Free from intentional harm  Outcome: Ongoing     Problem: Daily Care:  Goal: Daily care needs are met  Description: Daily care needs are met  Outcome: Ongoing     Problem: Pain:  Goal: Patient's pain/discomfort is manageable  Description: Patient's pain/discomfort is manageable  Outcome: Ongoing     Problem: Skin Integrity:  Goal: Skin integrity will stabilize  Description: Skin integrity will stabilize  Outcome: Ongoing     Problem: Falls - Risk of:  Goal: Will remain free from falls  Description: Will remain free from falls  Outcome: Ongoing  Goal: Absence of physical injury  Description: Absence of physical injury  Outcome: Ongoing

## 2021-03-07 NOTE — H&P
Hospitalist History & Physical  [facility]    Patient: Thuy Weller   : 1940   MRN: [id number]  Code Status: Full Code  PCP: Yves Ochoa MD  Date of Service: 3/6/2021    Chief Complaint:   Shortness of breath    History of Present Illness:   67-year-old male with past medical history as listed below presented to ER with shortness of breath. Multiple prior admissions for the same. Required intubation in 2021. Prior tobacco dependence. History of COPD. Also complains of low-grade fevers. No further history provided. Review of Systems:   Respiratory: positive for shortness of breath    Past Medical History:     Past Medical History:   Diagnosis Date    Acute liver failure without hepatic coma 10/23/2018    Back pain     \"with tired legs as a result\"    Bladder cancer ([address] 2018    Blood circulation, collateral     Carotid arterial disease (HCC)     recent surgery    CKD (chronic kidney disease), 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/2018    COPD with acute lower respiratory infection ([address] 2021    GERD (gastroesophageal reflux disease)     Hyperlipidemia     Hypertension     Hypertension     Palliative care patient 10/23/2018    Pneumonia due to infectious organism 2018    Primary osteoarthritis of left knee 10/14/2018    PVD (peripheral vascular disease) (HCC)     Tremor     Tremor on Right side x 1-2 weeks per stepdaughter    Type 2 diabetes mellitus with complication, without long-term current use of insulin ([address] 2021         Past Surgical History:     Past Surgical History:   Procedure Laterality Date    BACK SURGERY      COLONOSCOPY  ?     CYSTOSCOPY Bilateral 2018    CYSTOSCOPY, BIOSPY FULGURATION OF BLADDER TUMOR POSSIBLE TURBT, RETROGRADE PYELOGRAM [facility] FLX DX W/COLLJ SPEC WHEN PFRMD N/A 2017    Dr Terressa Romberg internal hemorrhoids, diverticular disease-HP-No recall (age)  IN REVISE MEDIAN N/CARPAL TUNNEL SURG Left 7/18/2018    OPEN CARPAL TUNNEL RELEASE performed by Higinio Bull MD at [address] Left 8/28/2018    LEFT CAROTID ENDARTERECTOMY WITH VEIN PATCH ANGIOPLASTY AND COMPLETION ANGIOGRAM performed by Zhang Altamirano MD at [facility] Left 10/15/2018    LEFT COMPLEX TOTAL KNEE ARTHROPLASTY performed by Higinio Bull MD at [facility] VASCULAR SURGERY  04/21/2015    Willow BESS Ultrasound guided access of left common femoral artery. Aortogram.Diagnostic right lower extremity arteriogram.Radiologic supervision and interpretation.  VASCULAR SURGERY  01/13/2015    Willow Lopez M.D Atherectomy,angioplasty,and stenting of left superficial femoral artery.  VASCULAR SURGERY  03/11/2014    Willow Lopez M.D. Ultrasound-guided access of right common femoral artery. Aortogram.Left lower extremity arteriogram.Atherectomy and angioplasty of left superficial femoral artery. Radiologic supervision and interpretation.  VASCULAR SURGERY  01/18/2013    Willow BESS Aortogram.Multistation arteriogram right lower extremity. Laser atherectomy and angioplasty of right superficial femoral artery. Selective catheterization of right tibioperoneal trunk. Angioplasty of peroneal artery and tibioperoneal trunk.  VASCULAR SURGERY  10/30/2018    SJS. Ultrasound guided cannulation of right internal vein. Placement of right internal jugular vein tunneled dialysis catheter bard equistream xk 23cm tip to cuff    VASCULAR SURGERY  12/17/2018    SJS. Removal of tunneled dilaysis catheter right internal jugular vein.         Family History:     Family History   Problem Relation Age of Onset    Colon Cancer Father     Diabetes Brother     Colon Polyps Neg Hx     Liver Cancer Neg Hx     Liver Disease Neg Hx     Esophageal Cancer Neg Hx     Rectal Cancer Neg Hx     Stomach Cancer Neg Hx Social History:     Social History     Socioeconomic History    Marital status:      Spouse name: None    Number of children: None    Years of education: None    Highest education level: None   Occupational History    None   Social Needs    Financial resource strain: None    Food insecurity     Worry: None     Inability: None    Transportation needs     Medical: None     Non-medical: None   Tobacco Use    Smoking status: Former Smoker     Quit date: 6/3/2003     Years since quittin.7    Smokeless tobacco: Never Used   Substance and Sexual Activity    Alcohol use:  Yes     Alcohol/week: 12.0 standard drinks     Types: 12 Glasses of wine per week     Comment: 2 glasses of wine every night    Drug use: No    Sexual activity: Yes     Partners: Female   Lifestyle    Physical activity     Days per week: None     Minutes per session: None    Stress: None   Relationships    Social connections     Talks on phone: None     Gets together: None     Attends Christianity service: None     Active member of club or organization: None     Attends meetings of clubs or organizations: None     Relationship status: None    Intimate partner violence     Fear of current or ex partner: None     Emotionally abused: None     Physically abused: None     Forced sexual activity: None   Other Topics Concern    None   Social History Narrative    None       Prior to Admission Medications:   Medications Prior to Admission: calcium carbonate (TUMS) 500 MG chewable tablet, Take 1 tablet by mouth 3 times daily as needed for Heartburn  hydrALAZINE (APRESOLINE) 25 MG tablet, Take 1 tablet by mouth every 8 hours  guaiFENesin (ROBITUSSIN) 100 MG/5ML syrup, Take 10 mLs by mouth every 4 hours as needed for Cough  famotidine (PEPCID) 20 MG tablet, Take 1 tablet by mouth daily  aspirin 81 MG EC tablet, Take 1 tablet by mouth daily  glipiZIDE (GLUCOTROL) 5 MG tablet, Take 1 tablet by mouth every morning (before breakfast) Rhinovirus/Enterovirus by PCR Not Detected Not Detected    Influenza A by PCR Not Detected Not Detected    Influenza B by PCR Not Detected Not Detected    Mycoplasma pneumoniae by PCR Not Detected Not Detected    Parainfluenza Virus 1 by PCR Not Detected Not Detected    Parainfluenza Virus 2 by PCR Not Detected Not Detected    Parainfluenza Virus 3 by PCR Not Detected Not Detected    Parainfluenza Virus 4 by PCR Not Detected Not Detected    Respiratory Syncytial Virus by PCR Not Detected Not Detected   APTT    Collection Time: 03/06/21 12:31 PM   Result Value Ref Range    aPTT 36.0 26.0 - 36.2 sec   Brain Natriuretic Peptide    Collection Time: 03/06/21 12:31 PM   Result Value Ref Range    Pro-BNP 1,710 0 - 1,800 pg/mL   CBC Auto Differential    Collection Time: 03/06/21 12:31 PM   Result Value Ref Range    WBC 6.5 4.8 - 10.8 K/uL    RBC 4.06 (L) 4.70 - 6.10 M/uL    Hemoglobin 11.2 (L) 14.0 - 18.0 g/dL    Hematocrit 37.8 (L) 42.0 - 52.0 %    MCV 93.1 80.0 - 94.0 fL    MCH 27.6 27.0 - 31.0 pg    MCHC 29.6 (L) 33.0 - 37.0 g/dL    RDW 14.8 (H) 11.5 - 14.5 %    Platelets 657 314 - 784 K/uL    MPV 10.7 9.4 - 12.4 fL    Neutrophils % 79.5 (H) 50.0 - 65.0 %    Lymphocytes % 7.1 (L) 20.0 - 40.0 %    Monocytes % 10.8 (H) 0.0 - 10.0 %    Eosinophils % 0.0 0.0 - 5.0 %    Basophils % 0.3 0.0 - 1.0 %    Neutrophils Absolute 5.2 1.5 - 7.5 K/uL    Immature Granulocytes # 0.2 K/uL    Lymphocytes Absolute 0.5 (L) 1.1 - 4.5 K/uL    Monocytes Absolute 0.70 0.00 - 0.90 K/uL    Eosinophils Absolute 0.00 0.00 - 0.60 K/uL    Basophils Absolute 0.00 0.00 - 0.20 K/uL   Comprehensive Metabolic Panel    Collection Time: 03/06/21 12:31 PM   Result Value Ref Range    Sodium 143 136 - 145 mmol/L    Potassium 4.4 3.5 - 5.0 mmol/L    Chloride 102 98 - 111 mmol/L    CO2 34 (H) 22 - 29 mmol/L    Anion Gap 7 7 - 19 mmol/L    Glucose 182 (H) 74 - 109 mg/dL    BUN 29 (H) 8 - 23 mg/dL    CREATININE 1.3 (H) 0.5 - 1.2 mg/dL    GFR Non-African American 53 Pneumoniae Antigen    Collection Time: 03/06/21  4:17 PM    Specimen: Urine, clean catch   Result Value Ref Range    STREP PNEUMONIAE ANTIGEN, URINE       Presumptive Negative  Presumptive negative suggests no current or recent  pneumococcal infection. Infection due to Strep pneumoniae  cannot be ruled out since the antigen present in the sample  may be below the detection limit of the test.  Normal Range:Presumptive Negative     POCT Glucose    Collection Time: 03/06/21  5:08 PM   Result Value Ref Range    POC Glucose 231 (H) 70 - 99 mg/dl    Performed on [employer]        I/O this shift: In: 846.7 [P.O.:100; IV Piggyback:746.7]  Out: 100 [Urine:100]    Xr Chest Portable    Result Date: 3/6/2021  Impression: Slight increase in bilateral RIGHT greater than LEFT middle and lower lung zone airspace opacity. Signed by Dr Katelyn Morin on 3/6/2021 1:14 PM    Cta Pulmonary W Contrast    Result Date: 3/6/2021  Impression: 1. No evidence of pulmonary embolus. 2.  Slight progression of bilateral RIGHT greater than LEFT consolidation, suspicious for pneumonia. 3.  Small bilateral pleural effusions and interstitial pulmonary edema.  Signed by Dr Katelyn Morin on 3/6/2021 1:56 PM      Assessment and Plan:   Recurrent versus nonresolving CAP with high risk for MDR pathogens  Multiple prior admissions for the same, required intubation 1/2021  CTA chest 3/6/2021: Bilateral pneumonia, no PE  Broad-spectrum antibiotics  Follow cultures  Lactate 0.6  Molecular respiratory panel negative  Pulmonary consulted for potential bronch to further eval and/or rule out postobstructive process etc     COPD exacerbation  Steroids  Nebs  Antibiotics  Goal sats 88-92%     Acute hypoxemic and hypercapnic respiratory failure  Secondary to above processes  BiPAP 12/6  Follow ABG/CXR  Wean as tolerated  Oximetry studies as warranted     Prior tobacco dependence  Counseled on continued cessation     CKD 3  Follow renal function/urine output  Avoid offending agents     Chronically elevated troponin  Denies chest pain  Current level lower than multiple prior levels     DM2  Meds on board     DVT prophylaxis  Lovenox    Total critical care time: 70 minutes  Total time spent managing the care of this patient: 70 minutes    Rosalina Garland MD  3/6/2021 8:44 PM

## 2021-03-07 NOTE — PROGRESS NOTES
Per patient and Wife: Henry Loredo to give information to ERIK Garcia and Jose Roberto Campbell)    Wife is Eddie Carnes

## 2021-03-07 NOTE — PLAN OF CARE
Problem: Skin Integrity:  Goal: Will show no infection signs and symptoms  Description: Will show no infection signs and symptoms  Outcome: Ongoing  Goal: Absence of new skin breakdown  Description: Absence of new skin breakdown  Outcome: Ongoing     Problem: Infection:  Goal: Will remain free from infection  Description: Will remain free from infection  Outcome: Ongoing     Problem: Safety:  Goal: Free from accidental physical injury  Description: Free from accidental physical injury  Outcome: Ongoing  Goal: Free from intentional harm  Description: Free from intentional harm  Outcome: Ongoing     Problem: Daily Care:  Goal: Daily care needs are met  Description: Daily care needs are met  Outcome: Ongoing     Problem: Pain:  Goal: Patient's pain/discomfort is manageable  Description: Patient's pain/discomfort is manageable  Outcome: Ongoing     Problem: Skin Integrity:  Goal: Skin integrity will stabilize  Description: Skin integrity will stabilize  Outcome: Ongoing

## 2021-03-07 NOTE — PLAN OF CARE
Problem: Skin Integrity:  Goal: Will show no infection signs and symptoms  Description: Will show no infection signs and symptoms  3/7/2021 0014 by Kylie Liriano RN  Outcome: Ongoing     Problem: Skin Integrity:  Goal: Absence of new skin breakdown  Description: Absence of new skin breakdown  3/7/2021 0014 by Kylie Liriano RN  Outcome: Ongoing     Problem: Infection:  Goal: Will remain free from infection  Description: Will remain free from infection  3/7/2021 0014 by Kylie Liriano RN  Outcome: Ongoing     Problem: Safety:  Goal: Free from accidental physical injury  Description: Free from accidental physical injury  3/7/2021 0014 by Kylie Liriano RN  Outcome: Ongoing     Problem: Safety:  Goal: Free from intentional harm  Description: Free from intentional harm  3/7/2021 0014 by Kylie Liriano RN  Outcome: Ongoing     Problem: Daily Care:  Goal: Daily care needs are met  Description: Daily care needs are met  3/7/2021 0014 by Kylie Liriano RN  Outcome: Ongoing     Problem: Pain:  Goal: Patient's pain/discomfort is manageable  Description: Patient's pain/discomfort is manageable  3/7/2021 0014 by Kylie Liriano RN  Outcome: Ongoing     Problem: Skin Integrity:  Goal: Skin integrity will stabilize  Description: Skin integrity will stabilize  3/7/2021 0014 by Kylie Liriano RN  Outcome: Ongoing     Problem: Falls - Risk of:  Goal: Will remain free from falls  Description: Will remain free from falls  Outcome: Ongoing     Problem: Falls - Risk of:  Goal: Absence of physical injury  Description: Absence of physical injury  Outcome: Ongoing

## 2021-03-08 LAB
ANION GAP SERPL CALCULATED.3IONS-SCNC: 7 MMOL/L (ref 7–19)
BASOPHILS ABSOLUTE: 0 K/UL (ref 0–0.2)
BASOPHILS RELATIVE PERCENT: 0 % (ref 0–1)
BUN BLDV-MCNC: 40 MG/DL (ref 8–23)
CALCIUM SERPL-MCNC: 8.4 MG/DL (ref 8.8–10.2)
CHLORIDE BLD-SCNC: 100 MMOL/L (ref 98–111)
CO2: 31 MMOL/L (ref 22–29)
CREAT SERPL-MCNC: 1.6 MG/DL (ref 0.5–1.2)
D DIMER: 0.83 UG/ML FEU (ref 0–0.48)
EOSINOPHILS ABSOLUTE: 0 K/UL (ref 0–0.6)
EOSINOPHILS RELATIVE PERCENT: 0 % (ref 0–5)
GFR AFRICAN AMERICAN: 51
GFR NON-AFRICAN AMERICAN: 42
GLUCOSE BLD-MCNC: 173 MG/DL (ref 74–109)
GLUCOSE BLD-MCNC: 179 MG/DL (ref 70–99)
GLUCOSE BLD-MCNC: 214 MG/DL (ref 70–99)
GLUCOSE BLD-MCNC: 223 MG/DL (ref 70–99)
GLUCOSE BLD-MCNC: 275 MG/DL (ref 70–99)
HCT VFR BLD CALC: 31.4 % (ref 42–52)
HEMOGLOBIN: 9.7 G/DL (ref 14–18)
IMMATURE GRANULOCYTES #: 0.1 K/UL
LV EF: 63 %
LVEF MODALITY: NORMAL
LYMPHOCYTES ABSOLUTE: 0.5 K/UL (ref 1.1–4.5)
LYMPHOCYTES RELATIVE PERCENT: 7.7 % (ref 20–40)
MAGNESIUM: 2 MG/DL (ref 1.6–2.4)
MCH RBC QN AUTO: 28.1 PG (ref 27–31)
MCHC RBC AUTO-ENTMCNC: 30.9 G/DL (ref 33–37)
MCV RBC AUTO: 91 FL (ref 80–94)
MONOCYTES ABSOLUTE: 0.3 K/UL (ref 0–0.9)
MONOCYTES RELATIVE PERCENT: 5 % (ref 0–10)
NEUTROPHILS ABSOLUTE: 5.1 K/UL (ref 1.5–7.5)
NEUTROPHILS RELATIVE PERCENT: 85.8 % (ref 50–65)
PDW BLD-RTO: 14.6 % (ref 11.5–14.5)
PERFORMED ON: ABNORMAL
PLATELET # BLD: 140 K/UL (ref 130–400)
PMV BLD AUTO: 12.3 FL (ref 9.4–12.4)
POTASSIUM SERPL-SCNC: 4.5 MMOL/L (ref 3.5–5)
PRO-BNP: 1793 PG/ML (ref 0–1800)
PROCALCITONIN: 0.07 NG/ML (ref 0–0.09)
RBC # BLD: 3.45 M/UL (ref 4.7–6.1)
SODIUM BLD-SCNC: 138 MMOL/L (ref 136–145)
TSH REFLEX FT4: 1.06 UIU/ML (ref 0.35–5.5)
WBC # BLD: 6 K/UL (ref 4.8–10.8)

## 2021-03-08 PROCEDURE — 84443 ASSAY THYROID STIM HORMONE: CPT

## 2021-03-08 PROCEDURE — 6370000000 HC RX 637 (ALT 250 FOR IP): Performed by: HOSPITALIST

## 2021-03-08 PROCEDURE — 99232 SBSQ HOSP IP/OBS MODERATE 35: CPT | Performed by: INTERNAL MEDICINE

## 2021-03-08 PROCEDURE — 84145 PROCALCITONIN (PCT): CPT

## 2021-03-08 PROCEDURE — 2000000000 HC ICU R&B

## 2021-03-08 PROCEDURE — 85025 COMPLETE CBC W/AUTO DIFF WBC: CPT

## 2021-03-08 PROCEDURE — 6360000002 HC RX W HCPCS: Performed by: INTERNAL MEDICINE

## 2021-03-08 PROCEDURE — 2580000003 HC RX 258: Performed by: EMERGENCY MEDICINE

## 2021-03-08 PROCEDURE — 94660 CPAP INITIATION&MGMT: CPT

## 2021-03-08 PROCEDURE — 93005 ELECTROCARDIOGRAM TRACING: CPT | Performed by: INTERNAL MEDICINE

## 2021-03-08 PROCEDURE — 83735 ASSAY OF MAGNESIUM: CPT

## 2021-03-08 PROCEDURE — 2500000003 HC RX 250 WO HCPCS: Performed by: HOSPITALIST

## 2021-03-08 PROCEDURE — 2500000003 HC RX 250 WO HCPCS: Performed by: INTERNAL MEDICINE

## 2021-03-08 PROCEDURE — 94640 AIRWAY INHALATION TREATMENT: CPT

## 2021-03-08 PROCEDURE — 82947 ASSAY GLUCOSE BLOOD QUANT: CPT

## 2021-03-08 PROCEDURE — 6370000000 HC RX 637 (ALT 250 FOR IP): Performed by: INTERNAL MEDICINE

## 2021-03-08 PROCEDURE — 2700000000 HC OXYGEN THERAPY PER DAY

## 2021-03-08 PROCEDURE — 83880 ASSAY OF NATRIURETIC PEPTIDE: CPT

## 2021-03-08 PROCEDURE — 36592 COLLECT BLOOD FROM PICC: CPT

## 2021-03-08 PROCEDURE — 6360000002 HC RX W HCPCS: Performed by: EMERGENCY MEDICINE

## 2021-03-08 PROCEDURE — 6360000004 HC RX CONTRAST MEDICATION: Performed by: INTERNAL MEDICINE

## 2021-03-08 PROCEDURE — 99223 1ST HOSP IP/OBS HIGH 75: CPT | Performed by: INTERNAL MEDICINE

## 2021-03-08 PROCEDURE — 80048 BASIC METABOLIC PNL TOTAL CA: CPT

## 2021-03-08 PROCEDURE — 85379 FIBRIN DEGRADATION QUANT: CPT

## 2021-03-08 PROCEDURE — 2580000003 HC RX 258: Performed by: INTERNAL MEDICINE

## 2021-03-08 PROCEDURE — 2580000003 HC RX 258: Performed by: HOSPITALIST

## 2021-03-08 PROCEDURE — C8923 2D TTE W OR W/O FOL W/CON,CO: HCPCS

## 2021-03-08 RX ORDER — 0.9 % SODIUM CHLORIDE 0.9 %
250 INTRAVENOUS SOLUTION INTRAVENOUS ONCE
Status: COMPLETED | OUTPATIENT
Start: 2021-03-08 | End: 2021-03-08

## 2021-03-08 RX ORDER — 0.9 % SODIUM CHLORIDE 0.9 %
1000 INTRAVENOUS SOLUTION INTRAVENOUS ONCE
Status: DISCONTINUED | OUTPATIENT
Start: 2021-03-08 | End: 2021-03-08

## 2021-03-08 RX ORDER — BUMETANIDE 0.25 MG/ML
1 INJECTION, SOLUTION INTRAMUSCULAR; INTRAVENOUS ONCE
Status: COMPLETED | OUTPATIENT
Start: 2021-03-08 | End: 2021-03-08

## 2021-03-08 RX ORDER — FUROSEMIDE 10 MG/ML
10 INJECTION INTRAMUSCULAR; INTRAVENOUS ONCE
Status: COMPLETED | OUTPATIENT
Start: 2021-03-08 | End: 2021-03-08

## 2021-03-08 RX ORDER — SODIUM CHLORIDE 9 MG/ML
INJECTION, SOLUTION INTRAVENOUS CONTINUOUS
Status: DISCONTINUED | OUTPATIENT
Start: 2021-03-08 | End: 2021-03-10 | Stop reason: SDUPTHER

## 2021-03-08 RX ADMIN — METOPROLOL SUCCINATE 50 MG: 50 TABLET, EXTENDED RELEASE ORAL at 09:00

## 2021-03-08 RX ADMIN — METHYLPREDNISOLONE SODIUM SUCCINATE 40 MG: 40 INJECTION, POWDER, FOR SOLUTION INTRAMUSCULAR; INTRAVENOUS at 14:38

## 2021-03-08 RX ADMIN — IPRATROPIUM BROMIDE AND ALBUTEROL SULFATE 1 AMPULE: 2.5; .5 SOLUTION RESPIRATORY (INHALATION) at 01:24

## 2021-03-08 RX ADMIN — VANCOMYCIN HYDROCHLORIDE 1000 MG: 10 INJECTION, POWDER, LYOPHILIZED, FOR SOLUTION INTRAVENOUS at 15:52

## 2021-03-08 RX ADMIN — MEROPENEM 1000 MG: 1 INJECTION, POWDER, FOR SOLUTION INTRAVENOUS at 05:47

## 2021-03-08 RX ADMIN — DOXYCYCLINE 100 MG: 100 INJECTION, POWDER, LYOPHILIZED, FOR SOLUTION INTRAVENOUS at 05:30

## 2021-03-08 RX ADMIN — TAMSULOSIN HYDROCHLORIDE 0.4 MG: 0.4 CAPSULE ORAL at 09:00

## 2021-03-08 RX ADMIN — IPRATROPIUM BROMIDE AND ALBUTEROL SULFATE 1 AMPULE: 2.5; .5 SOLUTION RESPIRATORY (INHALATION) at 18:35

## 2021-03-08 RX ADMIN — DOXYCYCLINE 100 MG: 100 INJECTION, POWDER, LYOPHILIZED, FOR SOLUTION INTRAVENOUS at 17:14

## 2021-03-08 RX ADMIN — METHYLPREDNISOLONE SODIUM SUCCINATE 40 MG: 40 INJECTION, POWDER, FOR SOLUTION INTRAMUSCULAR; INTRAVENOUS at 21:24

## 2021-03-08 RX ADMIN — PERFLUTREN 2.2 MG: 6.52 INJECTION, SUSPENSION INTRAVENOUS at 10:00

## 2021-03-08 RX ADMIN — IPRATROPIUM BROMIDE AND ALBUTEROL SULFATE 1 AMPULE: 2.5; .5 SOLUTION RESPIRATORY (INHALATION) at 14:57

## 2021-03-08 RX ADMIN — MICONAZOLE NITRATE: 2 POWDER TOPICAL at 17:15

## 2021-03-08 RX ADMIN — ATORVASTATIN CALCIUM 20 MG: 20 TABLET, FILM COATED ORAL at 09:00

## 2021-03-08 RX ADMIN — FUROSEMIDE 10 MG: 10 INJECTION, SOLUTION INTRAVENOUS at 15:29

## 2021-03-08 RX ADMIN — METHYLPREDNISOLONE SODIUM SUCCINATE 40 MG: 40 INJECTION, POWDER, FOR SOLUTION INTRAMUSCULAR; INTRAVENOUS at 05:47

## 2021-03-08 RX ADMIN — PANTOPRAZOLE SODIUM 40 MG: 40 TABLET, DELAYED RELEASE ORAL at 09:00

## 2021-03-08 RX ADMIN — SODIUM CHLORIDE: 9 INJECTION, SOLUTION INTRAVENOUS at 14:37

## 2021-03-08 RX ADMIN — SODIUM CHLORIDE, PRESERVATIVE FREE 10 ML: 5 INJECTION INTRAVENOUS at 10:28

## 2021-03-08 RX ADMIN — ENOXAPARIN SODIUM 100 MG: 100 INJECTION SUBCUTANEOUS at 14:37

## 2021-03-08 RX ADMIN — TROSPIUM CHLORIDE 20 MG: 20 TABLET, FILM COATED ORAL at 20:25

## 2021-03-08 RX ADMIN — MICONAZOLE NITRATE: 2 POWDER TOPICAL at 10:28

## 2021-03-08 RX ADMIN — ENOXAPARIN SODIUM 100 MG: 100 INJECTION SUBCUTANEOUS at 01:44

## 2021-03-08 RX ADMIN — AMIODARONE HYDROCHLORIDE 0.5 MG/MIN: 50 INJECTION, SOLUTION INTRAVENOUS at 13:20

## 2021-03-08 RX ADMIN — SODIUM CHLORIDE 250 ML: 9 INJECTION, SOLUTION INTRAVENOUS at 13:45

## 2021-03-08 RX ADMIN — IPRATROPIUM BROMIDE AND ALBUTEROL SULFATE 1 AMPULE: 2.5; .5 SOLUTION RESPIRATORY (INHALATION) at 11:06

## 2021-03-08 RX ADMIN — MICONAZOLE NITRATE: 2 POWDER TOPICAL at 20:24

## 2021-03-08 RX ADMIN — MICONAZOLE NITRATE: 2 POWDER TOPICAL at 14:38

## 2021-03-08 RX ADMIN — SODIUM CHLORIDE, PRESERVATIVE FREE 10 ML: 5 INJECTION INTRAVENOUS at 20:24

## 2021-03-08 RX ADMIN — INSULIN LISPRO 2 UNITS: 100 INJECTION, SOLUTION INTRAVENOUS; SUBCUTANEOUS at 09:01

## 2021-03-08 RX ADMIN — MEROPENEM 1000 MG: 1 INJECTION, POWDER, FOR SOLUTION INTRAVENOUS at 18:15

## 2021-03-08 RX ADMIN — INSULIN LISPRO 2 UNITS: 100 INJECTION, SOLUTION INTRAVENOUS; SUBCUTANEOUS at 20:16

## 2021-03-08 RX ADMIN — INSULIN LISPRO 1 UNITS: 100 INJECTION, SOLUTION INTRAVENOUS; SUBCUTANEOUS at 12:42

## 2021-03-08 RX ADMIN — IPRATROPIUM BROMIDE AND ALBUTEROL SULFATE 1 AMPULE: 2.5; .5 SOLUTION RESPIRATORY (INHALATION) at 07:12

## 2021-03-08 RX ADMIN — SODIUM CHLORIDE 1000 ML: 9 INJECTION, SOLUTION INTRAVENOUS at 22:18

## 2021-03-08 RX ADMIN — TAMSULOSIN HYDROCHLORIDE 0.4 MG: 0.4 CAPSULE ORAL at 20:24

## 2021-03-08 RX ADMIN — INSULIN LISPRO 2 UNITS: 100 INJECTION, SOLUTION INTRAVENOUS; SUBCUTANEOUS at 17:18

## 2021-03-08 RX ADMIN — IPRATROPIUM BROMIDE AND ALBUTEROL SULFATE 1 AMPULE: 2.5; .5 SOLUTION RESPIRATORY (INHALATION) at 22:04

## 2021-03-08 RX ADMIN — ASPIRIN 81 MG: 81 TABLET, FILM COATED ORAL at 09:00

## 2021-03-08 RX ADMIN — BUMETANIDE 1 MG: 0.25 INJECTION, SOLUTION INTRAMUSCULAR; INTRAVENOUS at 22:47

## 2021-03-08 ASSESSMENT — PAIN SCALES - GENERAL
PAINLEVEL_OUTOF10: 0

## 2021-03-08 NOTE — CONSULTS
Pulmonary and Critical Care Consult Note    THE [facility] [place]    MRN# [id number]    Acct# [de-identified]  3/8/2021   5:08 PM CST    Referring Yashira Oviedo MD      Chief Complaint: Shortness of breath. Requesting physician: Dr. Sayda Larkin. Reason for consult: recurrent pneumonia. HPI  The patient is a 71-year-old male who presented to the hospital for shortness of breath. The patient has a history of multiple admissions in the past with intubation in January of this year. He is also known to have congestive heart failure with an ejection fraction of 40% on recent echocardiogram.  His admission chest x-ray showed increased pulmonary vascular congestion and bilateral pleural effusion seen on CT of the chest.  He denies any fever. There is no leukocytosis. He was started on noninvasive ventilation and transition to nasal cannula oxygen. He feels significantly improved since admission. I was asked to see him regarding possibility of recurrent pneumonia. He had evaluation for possible aspiration in the past and he was on thickened liquids. He denies choking on food and water. Apparently he did have an episode of A. fib with RVR for which cardiology was consulted. At this time he says he is feeling great.     Past Medical History      Past Medical History:   Diagnosis Date    Acute liver failure without hepatic coma 10/23/2018    Back pain     \"with tired legs as a result\"    Bladder cancer ([address] 12/19/2018    Blood circulation, collateral     Carotid arterial disease (HCC)     recent surgery    CKD (chronic kidney disease), 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/2018    COPD with acute lower respiratory infection ([address] 2/24/2021    GERD (gastroesophageal reflux disease)     Hyperlipidemia     Hypertension     Hypertension     Palliative care patient 10/23/2018    Pneumonia due to infectious organism 11/06/2018    Primary osteoarthritis of left knee 10/14/2018    PVD (peripheral vascular disease) [facility]     Tremor     Tremor on Right side x 1-2 weeks per stepdaughter    Type 2 diabetes mellitus with complication, without long-term current use of insulin ([facility] 1/21/2021     SurgicalHistory  Past Surgical History:   Procedure Laterality Date    BACK SURGERY      COLONOSCOPY  2007?  CYSTOSCOPY Bilateral 12/19/2018    CYSTOSCOPY, BIOSPY FULGURATION OF BLADDER TUMOR POSSIBLE TURBT, RETROGRADE PYELOGRAM performed by Jolanta Rae MD at [facility] COLONOSCOPY FLX DX W/COLLJ SPEC WHEN PFRMD N/A 9/11/2017    Dr Jones Fuelling internal hemorrhoids, diverticular disease-HP-No recall (age)   Shahla Cushing [place] REVISE MEDIAN N/CARPAL TUNNEL SURG Left 7/18/2018    OPEN CARPAL TUNNEL RELEASE performed by Nate Rahman MD at [address] Left 8/28/2018    LEFT CAROTID ENDARTERECTOMY WITH VEIN PATCH ANGIOPLASTY AND COMPLETION ANGIOGRAM performed by Louis Harmon MD at [facility] Left 10/15/2018    LEFT COMPLEX TOTAL KNEE ARTHROPLASTY performed by Nate Rahman MD at [facility] VASCULAR SURGERY  04/21/2015    Benedicto BESS Ultrasound guided access of left common femoral artery. Aortogram.Diagnostic right lower extremity arteriogram.Radiologic supervision and interpretation.  VASCULAR SURGERY  01/13/2015    Benedicto Hernandez M.D Atherectomy,angioplasty,and stenting of left superficial femoral artery.  VASCULAR SURGERY  03/11/2014    Benedicto Hernandez M.D. Ultrasound-guided access of right common femoral artery. Aortogram.Left lower extremity arteriogram.Atherectomy and angioplasty of left superficial femoral artery. Radiologic supervision and interpretation.  VASCULAR SURGERY  01/18/2013    Benedicto BESS Aortogram.Multistation arteriogram right lower extremity. Laser atherectomy and angioplasty of right superficial femoral artery. Selective catheterization of right tibioperoneal trunk. Angioplasty of peroneal artery and tibioperoneal trunk.  VASCULAR SURGERY  10/30/2018    [facility]. Ultrasound guided cannulation of right internal vein. Placement of right internal jugular vein tunneled dialysis catheter bard tian faithk 23cm tip to cuff    VASCULAR SURGERY  12/17/2018    [facility]. Removal of tunneled dilaysis catheter right internal jugular vein. Allergies  Allergies   Allergen Reactions    Eliquis [Apixaban] Other (See Comments)     \"almost bled to death\"    Promethazine Hcl Other (See Comments)     He became encephalopathic for several hours and was not responsive. Medications    miconazole, , Topical, 4x Daily    meropenem (MERREM) 1000 mg in SWFI 20 mL IV syringe, 1,000 mg, Intravenous, Q12H    tamsulosin, 0.4 mg, Oral, BID    enoxaparin, 1 mg/kg, Subcutaneous, BID    atropine, 1 mg, Intravenous, Once    [Held by provider] dilTIAZem, 30 mg, Oral, 3 times per day    vancomycin (VANCOCIN) intermittent dosing (placeholder), , Other, RX Placeholder    vancomycin, 1,000 mg, Intravenous, Q24H    doxycycline (VIBRAMYCIN) IV, 100 mg, Intravenous, Q12H    ipratropium-albuterol, 1 ampule, Inhalation, Q4H    methylPREDNISolone, 40 mg, Intravenous, Q8H    aspirin, 81 mg, Oral, Daily    atorvastatin, 20 mg, Oral, Daily    insulin lispro, 0-6 Units, Subcutaneous, TID WC    insulin lispro, 0-3 Units, Subcutaneous, Nightly    hydrALAZINE, 25 mg, Oral, 3 times per day    metoprolol succinate, 100 mg, Oral, Daily    NIFEdipine, 60 mg, Oral, Daily    pantoprazole, 40 mg, Oral, Daily    trospium, 20 mg, Oral, Nightly    sodium chloride flush, 10 mL, Intravenous, 2 times per day   Social History   reports that he quit smoking about 17 years ago. He has never used smokeless tobacco. He reports current alcohol use of about 12.0 standard drinks of alcohol per week. He reports that he does not use drugs. Family History  family history includes Colon Cancer in his father; Diabetes in his brother.     Review of Systems:  RESPIRATORY:  positive for  dyspnea    Physical Exam:  /71   Pulse 74   Temp 97.9 °F (36.6 °C) (Temporal)   Resp 25   Ht 6' (1.829 m)   Wt 237 lb 12.8 oz (107.9 kg)   SpO2 92%   BMI 32.25 kg/m²     Intake/Output Summary (Last 24 hours) at 3/8/2021 1714  Last data filed at 3/8/2021 1600  Gross per 24 hour   Intake 1113.81 ml   Output 685 ml   Net 428.81 ml       General Appearance: alert and oriented to person, place and time, well-developed and well-nourished, in no acute distress  ENT: Normocephalic atraumatic  Pulmonary/Chest: Diminished breath sounds bilaterally.   No rubs or chest wall tenderness or dullness to percussion  Cardiovascular: normal rate, normal S1 and S2, no gallops, intact distal pulses and no carotid bruits  Abdomen: soft, non-tender, non-distended, normal bowel sounds, no masses or organomegaly  Extremities: no cyanosis, no clubbing and 1+ edema  Neurologic: No focal deficit    Labs:  Recent Labs     03/06/21  1231 03/07/21  0306 03/08/21  0400   WBC 6.5 3.5* 6.0   RBC 4.06* 3.59* 3.45*   HGB 11.2* 9.9* 9.7*   HCT 37.8* 33.0* 31.4*    99* 140   MCV 93.1 91.9 91.0   MCH 27.6 27.6 28.1   MCHC 29.6* 30.0* 30.9*   RDW 14.8* 14.4 14.6*      Recent Labs     03/06/21  1231 03/06/21  1243 03/07/21  0306 03/08/21  0400     --  138 138   K 4.4 4.1 5.2* 4.5     --  102 100   CO2 34*  --  29 31*   BUN 29*  --  30* 40*   CREATININE 1.3*  --  1.3* 1.6*   CALCIUM 9.2  --  8.2* 8.4*   GLUCOSE 182*  --  168* 173*      Recent Labs     03/06/21  1243   PHART 7.330*   UBM0IEB 67.0*   PO2ART 51.0*   ZGO0JZD 35.3*   [id number] 84.8*   BEART 7.4*     Recent Labs     03/06/21  1231 03/06/21  1231 03/07/21  2130 03/08/21  0400 03/08/21  0830 03/08/21  1515   AST 16  --   --   --   --   --    ALT 21  --   --   --   --   --    ALKPHOS 137*  --   --   --   --   --    BILITOT 0.5  --   --   --   --   --    MG  --    < >  --  2.0  --   --    CALCIUM 9.2   < >  --  8.4*  --   -- PROBNP 1,710  --   --   --  1,793  --    TROPONINI 0.03   < > 0.02  --   --   --    LACTA 0.6  --   --   --   --   --    INR 1.13  --   --   --   --   --    DDIMER 1.59*  --   --   --  0.83*  --    PROCAL  --   --   --   --   --  0.07    < > = values in this interval not displayed. Recent Labs     03/06/21  1230   BC No Growth to date. Any change in status will be called. Radiograph: Xr Chest Portable    Result Date: 3/7/2021  Impression: 1. CVL tip overlies the SVC. 2.  No significant change in bilateral interstitial and airspace opacity, greatest in the RIGHT lower lung. Signed by Dr Denis Cade on 3/7/2021 1:36 PM    Xr Chest Portable    Result Date: 3/6/2021  Impression: Slight increase in bilateral RIGHT greater than LEFT middle and lower lung zone airspace opacity. Signed by Dr Denis Cade on 3/6/2021 1:14 PM    Cta Pulmonary W Contrast    Result Date: 3/6/2021  Impression: 1. No evidence of pulmonary embolus. 2.  Slight progression of bilateral RIGHT greater than LEFT consolidation, suspicious for pneumonia. 3.  Small bilateral pleural effusions and interstitial pulmonary edema. Signed by Dr Denis Cade on 3/6/2021 1:56 PM       My radiograph interpretation/independent review of imaging: Viewed by myself. Problem list generated by [facility] Problems           Last Modified POA    Recurrent pneumonia 3/6/2021 Yes         Pulmonary Assessment:    1. Acute on chronic hypoxic hypercapnic respiratory failure continue current management with the oxygen as necessary to maintain adequate oxygenation. 2. No clinical evidence of infectious pneumonia at this time. It is possible that the patient might have silent aspiration with a possible secondary pneumonitis however he does not have leukocytosis no fever. He does have bilateral pleural effusions and a BNP of 1700. Most likely explanation of his current respiratory issues cardiac failure. Continue to optimize cardiac status. Cardiology has been consulted. He is on empirical antibiotics therapy. Consider curtailing antibiotics. No indication for bronch at this time. 3. Possible aspiration. Repeat swallow evaluation. 4. Possible underlying obstructive sleep apnea. The patient is willing to undergo sleep study as outpatient will order sleep study. 5. Dyspnea multifactorial due to the above supportive care.   6. DVT prophylaxis        Electronically signed by Donavan Ivory MD on 03/08/21 at 5:08 PM

## 2021-03-08 NOTE — PLAN OF CARE
Problem: Skin Integrity:  Goal: Will show no infection signs and symptoms  Description: Will show no infection signs and symptoms  3/8/2021 0239 by Reji Ramos RN  Outcome: Ongoing  3/7/2021 1759 by Juli Carcamo RN  Outcome: Ongoing  Goal: Absence of new skin breakdown  Description: Absence of new skin breakdown  3/8/2021 0239 by Reji Ramos RN  Outcome: Ongoing  3/7/2021 1759 by Juli Carcamo RN  Outcome: Ongoing     Problem: Infection:  Goal: Will remain free from infection  Description: Will remain free from infection  3/8/2021 0239 by Reji Ramos RN  Outcome: Ongoing  3/7/2021 1759 by Juli Carcamo RN  Outcome: Ongoing     Problem: Safety:  Goal: Free from accidental physical injury  Description: Free from accidental physical injury  3/8/2021 0239 by Reji Ramos RN  Outcome: Ongoing  3/7/2021 1759 by Juli Carcamo RN  Outcome: Ongoing  Goal: Free from intentional harm  Description: Free from intentional harm  3/8/2021 0239 by Reji Ramos RN  Outcome: Ongoing  3/7/2021 1759 by Juli Carcamo RN  Outcome: Ongoing     Problem: Daily Care:  Goal: Daily care needs are met  Description: Daily care needs are met  3/8/2021 0239 by Reji Ramos RN  Outcome: Ongoing  3/7/2021 1759 by Juli Carcamo RN  Outcome: Ongoing     Problem: Pain:  Goal: Patient's pain/discomfort is manageable  Description: Patient's pain/discomfort is manageable  3/8/2021 0239 by Reji Ramos RN  Outcome: Ongoing  3/7/2021 1759 by Juli Carcamo RN  Outcome: Ongoing     Problem: Skin Integrity:  Goal: Skin integrity will stabilize  Description: Skin integrity will stabilize  3/8/2021 0239 by Reji Ramos RN  Outcome: Ongoing  3/7/2021 1759 by Juli Carcamo RN  Outcome: Ongoing     Problem: Falls - Risk of:  Goal: Will remain free from falls  Description: Will remain free from falls  3/8/2021 0239 by Reji Ramos RN  Outcome: Ongoing  3/7/2021 1759 by Juli Carcamo RN  Outcome: Ongoing Goal: Absence of physical injury  Description: Absence of physical injury  3/8/2021 0239 by Jesús Mcdaniel RN  Outcome: Ongoing  3/7/2021 1759 by Zion Alves RN  Outcome: Ongoing

## 2021-03-08 NOTE — PROGRESS NOTES
Hospitalist Progress Note  [facility]     Patient: Yury Patrick  : 1940  MRN: [id number]  Code Status: Full Code    Hospital Day: 2   Date of Service: 3/8/2021    Subjective:   Pt seen and examined. Resting comfortably. No current complaints. Past Medical History:   Diagnosis Date    Acute liver failure without hepatic coma 10/23/2018    Back pain     \"with tired legs as a result\"    Bladder cancer ([address] 2018    Blood circulation, collateral     Carotid arterial disease (HCC)     recent surgery    CKD (chronic kidney disease), 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/2018    COPD with acute lower respiratory infection ([address] 2021    GERD (gastroesophageal reflux disease)     Hyperlipidemia     Hypertension     Hypertension     Palliative care patient 10/23/2018    Pneumonia due to infectious organism 2018    Primary osteoarthritis of left knee 10/14/2018    PVD (peripheral vascular disease) (HCC)     Tremor     Tremor on Right side x 1-2 weeks per stepdaughter    Type 2 diabetes mellitus with complication, without long-term current use of insulin ([facility] 2021       Past Surgical History:   Procedure Laterality Date    BACK SURGERY      COLONOSCOPY  ?     CYSTOSCOPY Bilateral 2018    CYSTOSCOPY, BIOSPY FULGURATION OF BLADDER TUMOR POSSIBLE TURBT, RETROGRADE PYELOGRAM performed by Cheli Parisi MD at [address] COLONOSCOPY FLX DX W/COLLJ SPEC WHEN PFRMD N/A 2017    Dr Michael Paul internal hemorrhoids, diverticular disease-HP-No recall (age)   [facility] [place] REVISE MEDIAN N/CARPAL TUNNEL SURG Left 2018    OPEN CARPAL TUNNEL RELEASE performed by Marielos Montes MD at [address] Left 2018    LEFT CAROTID ENDARTERECTOMY WITH VEIN PATCH ANGIOPLASTY AND COMPLETION ANGIOGRAM performed by Jayce Pascual MD at [place] [address] Left 10/15/2018    LEFT COMPLEX TOTAL KNEE ARTHROPLASTY performed by Hector Bartholomew MD at [facility] VASCULAR SURGERY  04/21/2015    Lorenzo BESS Ultrasound guided access of left common femoral artery. Aortogram.Diagnostic right lower extremity arteriogram.Radiologic supervision and interpretation.  VASCULAR SURGERY  01/13/2015    Lorenzo Anaya M.D Atherectomy,angioplasty,and stenting of left superficial femoral artery.  VASCULAR SURGERY  03/11/2014    Lorenzo Anaya M.D. Ultrasound-guided access of right common femoral artery. Aortogram.Left lower extremity arteriogram.Atherectomy and angioplasty of left superficial femoral artery. Radiologic supervision and interpretation.  VASCULAR SURGERY  01/18/2013    Lorenzo BESS Aortogram.Multistation arteriogram right lower extremity. Laser atherectomy and angioplasty of right superficial femoral artery. Selective catheterization of right tibioperoneal trunk. Angioplasty of peroneal artery and tibioperoneal trunk.  VASCULAR SURGERY  10/30/2018    [facility]. Ultrasound guided cannulation of right internal vein. Placement of right internal jugular vein tunneled dialysis catheter bard equistream xk 23cm tip to cuff    VASCULAR SURGERY  12/17/2018    SJS. Removal of tunneled dilaysis catheter right internal jugular vein.        Family History   Problem Relation Age of Onset    Colon Cancer Father     Diabetes Brother     Colon Polyps Neg Hx     Liver Cancer Neg Hx     Liver Disease Neg Hx     Esophageal Cancer Neg Hx     Rectal Cancer Neg Hx     Stomach Cancer Neg Hx        Social History     Socioeconomic History    Marital status:      Spouse name: Not on file    Number of children: Not on file    Years of education: Not on file    Highest education level: Not on file   Occupational History    Not on file   Social Needs    Financial resource strain: Not on file    Food insecurity     Worry: Not on file     Inability: Not on file    Transportation needs     Medical: Not on file Non-medical: Not on file   Tobacco Use    Smoking status: Former Smoker     Quit date: 6/3/2003     Years since quittin.7    Smokeless tobacco: Never Used   Substance and Sexual Activity    Alcohol use:  Yes     Alcohol/week: 12.0 standard drinks     Types: 12 Glasses of wine per week     Comment: 2 glasses of wine every night    Drug use: No    Sexual activity: Yes     Partners: Female   Lifestyle    Physical activity     Days per week: Not on file     Minutes per session: Not on file    Stress: Not on file   Relationships    Social connections     Talks on phone: Not on file     Gets together: Not on file     Attends [employer] service: Not on file     Active member of club or organization: Not on file     Attends meetings of clubs or organizations: Not on file     Relationship status: Not on file    Intimate partner violence     Fear of current or ex partner: Not on file     Emotionally abused: Not on file     Physically abused: Not on file     Forced sexual activity: Not on file   Other Topics Concern    Not on file   Social History Narrative    Not on file       Current Facility-Administered Medications   Medication Dose Route Frequency Provider Last Rate Last Admin    miconazole (MICOTIN) 2 % powder   Topical 4x Daily Jassi Dowling MD        [facility] capsule 0.4 mg  0.4 mg Oral BID Nathaly Corey MD   0.4 mg at 21 2042    amiodarone (CORDARONE) 450 mg in dextrose 5 % 250 mL infusion  0.5 mg/min Intravenous Continuous Jassi Dowling MD 16.7 mL/hr at 21 2240 0.5 mg/min at 21 2240    enoxaparin (LOVENOX) injection 100 mg  1 mg/kg Subcutaneous BID Annalisa Nolasco MD   100 mg at 21 0144    dilTIAZem 125 mg in dextrose 5 % 125 mL infusion  5-15 mg/hr Intravenous Continuous Annalisa Nolasco MD   Stopped at 21 1232    atropine injection 1 mg  1 mg Intravenous Once Annalisa Nolasco MD   Stopped at 21 1519    dilTIAZem (CARDIZEM) tablet 30 mg  30 mg Oral 3 times per day Jayjay Valdes MD   Stopped at 03/07/21 2043    meropenem (MERREM) 1,000 mg in sterile water 20 mL IV syringe  1,000 mg Intravenous Q8H Jean Villalpando MD   1,000 mg at 03/08/21 0547    vancomycin (VANCOCIN) intermittent dosing (placeholder)   Other RX Placeholder Jean Villalpando MD        vancomycin (VANCOCIN) 1000 mg in dextrose 5% 250 mL IVPB  1,000 mg Intravenous Q24H Jean Villalpando MD   Stopped at 03/07/21 1654    doxycycline (VIBRAMYCIN) 100 mg in dextrose 5 % 100 mL IVPB  100 mg Intravenous Q12H Melissa Calvo MD   Stopped at 03/08/21 0630    labetalol (NORMODYNE;TRANDATE) injection 20 mg  20 mg Intravenous Q4H PRN Melissa Calvo MD        ipratropium-albuterol (DUONEB) nebulizer solution 1 ampule  1 ampule Inhalation Q4H Melissa Calvo MD   1 ampule at 03/08/21 7508    methylPREDNISolone sodium (SOLU-MEDROL) injection 40 mg  40 mg Intravenous Q8H Melissa Calvo MD   40 mg at 03/08/21 0547    aspirin EC tablet 81 mg  81 mg Oral Daily Melissa Calvo MD   81 mg at 03/07/21 0831    atorvastatin (LIPITOR) tablet 20 mg  20 mg Oral Daily Melissa Calvo MD   20 mg at 03/07/21 0831    calcium carbonate (TUMS) chewable tablet 500 mg  500 mg Oral TID PRN Melissa Calvo MD        insulin lispro (HUMALOG) injection vial 0-6 Units  0-6 Units Subcutaneous TID WC Melissa Calvo MD   1 Units at 03/07/21 1600    insulin lispro (HUMALOG) injection vial 0-3 Units  0-3 Units Subcutaneous Nightly Melissa Calvo MD   1 Units at 03/07/21 2036    guaiFENesin (ROBITUSSIN) 100 MG/5ML liquid 200 mg  200 mg Oral Q4H PRN Melissa Calvo MD        hydrALAZINE (APRESOLINE) tablet 25 mg  25 mg Oral 3 times per day Melissa Calvo MD   25 mg at 03/07/21 0538    metoprolol succinate (TOPROL XL) extended release tablet 100 mg  100 mg Oral Daily Melissa Calvo MD   100 mg at 03/07/21 0831    NIFEdipine (PROCARDIA XL) extended release tablet 60 mg  60 mg Oral Daily Melissa Calvo MD   60 mg at 03/07/21 0831    pantoprazole (PROTONIX) tablet 40 mg  40 mg Oral Daily Maryann Bazzi MD   40 mg at 03/07/21 0831    trospium (SANCTURA) tablet 20 mg  20 mg Oral Nightly Maryann Bazzi MD   20 mg at 03/07/21 2041    sodium chloride flush 0.9 % injection 10 mL  10 mL Intravenous 2 times per day Maryann Bazzi MD   10 mL at 03/07/21 2042    sodium chloride flush 0.9 % injection 10 mL  10 mL Intravenous PRN Maryann Bazzi MD        polyethylene glycol [facility]) packet 17 g  17 g Oral Daily PRN Maryann Bazzi MD        acetaminophen (TYLENOL) tablet 650 mg  650 mg Oral Q6H PRN Maryann Bazzi MD        Or    acetaminophen (TYLENOL) suppository 650 mg  650 mg Rectal Q6H PRN Maryann Bazzi MD        glucose (GLUTOSE) 40 % oral gel 15 g  15 g Oral PRN Maryann Bazzi MD        dextrose 50 % IV solution  12.5 g Intravenous PRN Maryann Bazzi MD        glucagon (rDNA) injection 1 mg  1 mg Intramuscular PRN Maryann Bazzi MD        dextrose 5 % solution  100 mL/hr Intravenous PRN Maryann Bazzi MD             amiodarone 0.5 mg/min (03/07/21 2240)    dilTIAZem (CARDIZEM) 125 mg in dextrose 5% 125 mL infusion Stopped (03/07/21 1232)    dextrose          Objective:   BP (!) 128/58   Pulse 64   Temp 98 °F (36.7 °C)   Resp 21   Ht 6' (1.829 m)   Wt 237 lb 12.8 oz (107.9 kg)   SpO2 (!) 85%   BMI 32.25 kg/m²     General: no acute distress  HEENT: normocephalic, atraumatic  Neck: supple, symmetrical, trachea midline   Lungs: bilateral rhonchi  Cardiovascular: s1 and s2 normal  Abdomen: soft, positive bowel sounds  Extremities: no cyanosis   Neuro: aaox3, no acute focal motor deficits   Skin: normal color and texture    Recent Labs     03/06/21  1231 03/07/21  0306 03/08/21  0400   WBC 6.5 3.5* 6.0   RBC 4.06* 3.59* 3.45*   HGB 11.2* 9.9* 9.7*   HCT 37.8* 33.0* 31.4*   MCV 93.1 91.9 91.0   MCH 27.6 27.6 28.1   MCHC 29.6* 30.0* 30.9*    99* 140     Recent Labs     03/06/21  1231 03/06/21  1243 03/07/21  0306 03/08/21 Exam: CT chest angiography with 3D MIP images with IV contrast - 3/6/2021 12:33 PM Indication: Worsening respiratory function, history of pneumonia Comparison: 2/13/2021 DLP: 927 mGy cm. In order to have a CT radiation dose as low as reasonably achievable, Automated Exposure Control was utilized for adjustment of the mA and/or KV according to patient size. Findings: No evidence of pulmonary embolus. Main pulmonary artery is upper limits of normal in caliber. Thoracic aorta is nonaneurysmal. Atherosclerotic change in the aortic arch and coronary arteries noted. No pericardial effusion. Central airways are clear. Interlobular septal thickening. Slight interval worsening of multifocal bilateral consolidation and groundglass opacity. Disease burden is greatest in the RIGHT upper lobe. Small RIGHT greater than LEFT pleural effusions with adjacent atelectasis. No pneumothorax. Mild mediastinal lymphadenopathy similar to prior and likely reactive. 1.3 cm RIGHT thyroid nodule. No acute chest wall soft tissue abnormality. No acute osseous finding. Thoracic spine is scoliotic curvature and degenerative change. No acute osseous finding. Impression: 1. No evidence of pulmonary embolus. 2.  Slight progression of bilateral RIGHT greater than LEFT consolidation, suspicious for pneumonia. 3.  Small bilateral pleural effusions and interstitial pulmonary edema.  Signed by Dr René Miner on 3/6/2021 1:56 PM       Assessment and Plan:   Recurrent versus nonresolving CAP with high risk for MDR pathogens  Multiple prior admissions for the same, required intubation 1/2021  CTA chest 3/6/2021: Bilateral pneumonia, no PE  Broad-spectrum antibiotics  Follow cultures  Lactate 0.6  Molecular respiratory panel negative  Pulmonary consulted for potential bronch to further eval and/or rule out postobstructive process etc     COPD exacerbation  Steroids  Nebs  Antibiotics  Goal sats 88-92%     Acute hypoxemic and hypercapnic respiratory

## 2021-03-08 NOTE — PROGRESS NOTES
Pharmacy Renal Adjustment    Toma Duarte is a 79 y.o. male. Pharmacy has renally adjusted medications per protocol. Recent Labs     03/07/21  0306 03/08/21  0400   BUN 30* 40*       Recent Labs     03/07/21  0306 03/08/21  0400   CREATININE 1.3* 1.6*       Estimated Creatinine Clearance: 47 mL/min (A) (based on SCr of 1.6 mg/dL (H)).     Height:   Ht Readings from Last 1 Encounters:   03/08/21 6' (1.829 m)     Weight:  Wt Readings from Last 1 Encounters:   03/08/21 237 lb 12.8 oz (107.9 kg)       Plan: Adjust the following medications based on renal function:           Meropenem 1 g IV every 8 hours to 1 g IV every 12 hours    Electronically signed by Benedicto Breen [facility] on 3/8/2021 at 2:03 PM

## 2021-03-08 NOTE — CARE COORDINATION
Patient is current with [address] [address] for Nursing, PT Services. Will follow. Please advise when patient discharges. WILL NEED RYAN ORDERS TO RESUME HH SERVICES WHEN PATIENT DISCHARGES. Thank you. [address] [place] [address] [phone]. [phone].     Rick Márquez RN, Home Care Liaison  [phone] P  Electronically signed by Rick Márquez on 3/8/2021 at 8:57 AM

## 2021-03-08 NOTE — PROGRESS NOTES
Elevated troponin 10/23/2018     Priority: Low    Palliative care patient 10/23/2018     Priority: Low    Arthritis of knee 10/15/2018     Priority: Low    Essential hypertension 10/15/2018     Priority: Low    Pure hypercholesterolemia 10/15/2018     Priority: Low    Slow transit constipation 10/15/2018     Priority: Low    Iron deficiency anemia 10/15/2018     Priority: Low    GERD (gastroesophageal reflux disease) 10/15/2018     Priority: Low    Primary osteoarthritis of left knee 10/14/2018     Priority: Low    Carotid stenosis, asymptomatic, left 08/28/2018     Priority: Low    Bilateral carotid artery stenosis 06/25/2018     Priority: Low    Atherosclerosis of native artery of both lower extremities with intermittent claudication ([facility] [address] 06/25/2018     Priority: Low    Colonic diverticular abscess 08/01/2017     Priority: Low    Generalized abdominal pain      Priority: Low    Diverticulitis of large intestine with perforation without bleeding 06/04/2017     Priority: Low       Admit Date:  3/6/2021    Admission Problem List: Present on Admission:   Recurrent pneumonia      Cardiac Specific Data:  Specialty Problems        Cardiology Problems    Atherosclerosis of native artery of both lower extremities with intermittent claudication (HCC)        Bilateral carotid artery stenosis        Carotid stenosis, asymptomatic, left        Essential hypertension        Pure hypercholesterolemia        CHF (congestive heart failure) ([facility] [address]              Subjective:  Mr. Jeaneth Lux seen today admitted 3/6/2020 with shortness of breath found to be having recurrent pneumonias been hospitalized several other occasions recently. Intubated previously 1/2021. Also noted low-grade fever. Denies anginal chest pain. Admitted for further assessment. May have had an episode of nonsustained ventricular tachycardia recently now in sinus rhythm. Cardiology consulted. D-dimer 0.83.  proBNP 1793.   Another D-dimer was 1.59 on 3 6. Objective:   BP (!) 123/57   Pulse 67   Temp 97.9 °F (36.6 °C) (Temporal)   Resp 25   Ht 6' (1.829 m)   Wt 237 lb 12.8 oz (107.9 kg)   SpO2 98%   BMI 32.25 kg/m²       Intake/Output Summary (Last 24 hours) at 3/8/2021 1513  Last data filed at 3/8/2021 1400  Gross per 24 hour   Intake 747.91 ml   Output 635 ml   Net 112.91 ml       Prior to Admission medications    Medication Sig Start Date End Date Taking?  Authorizing Provider   calcium carbonate (TUMS) 500 MG chewable tablet Take 1 tablet by mouth 3 times daily as needed for Heartburn 2/24/21 3/26/21  Rolan Ontiveros DO   hydrALAZINE (APRESOLINE) 25 MG tablet Take 1 tablet by mouth every 8 hours 2/24/21   [facility], DO   guaiFENesin (ROBITUSSIN) 100 MG/5ML syrup Take 10 mLs by mouth every 4 hours as needed for Cough 2/24/21   Rolan Ontiveros DO   famotidine (PEPCID) 20 MG tablet Take 1 tablet by mouth daily 2/24/21   Rolan Ontiveros DO   aspirin 81 MG EC tablet Take 1 tablet by mouth daily 1/30/21   [facility] MD Cierra   glipiZIDE (GLUCOTROL) 5 MG tablet Take 1 tablet by mouth every morning (before breakfast) 1/30/21   [facility] MD Cierra   atorvastatin (LIPITOR) 20 MG tablet Take 1 tablet by mouth daily 1/29/21   [facility] MD Cierra   metoprolol succinate (TOPROL XL) 100 MG extended release tablet Take 1 tablet by mouth daily 1/30/21   [facility] MD Cierra   NIFEdipine (ADALAT CC) 60 MG extended release tablet Take 1 tablet by mouth daily 1/30/21   [facility] MD Cierra   tamsulosin (FLOMAX) 0.4 MG capsule Take 1 capsule by mouth daily 1/30/21   [facility] MD Cierra   pantoprazole (PROTONIX) 40 MG tablet Take 1 tablet by mouth daily 1/30/21   [facility] MD Cierra   trospium (SANCTURA) 20 MG tablet Take 1 tablet by mouth nightly 1/29/21   [facility] MD Cierra        miconazole   Topical 4x Daily    meropenem (MERREM) 1000 mg in SWFI 20 mL IV syringe  1,000 mg Intravenous Q12H    tamsulosin  0.4 mg Oral BID    enoxaparin  1 mg/kg Subcutaneous BID    atropine  1 mg Intravenous Once   [facility] by provider] dilTIAZem  30 mg Oral 3 times per day    vancomycin (VANCOCIN) intermittent dosing (placeholder)   Other RX Placeholder    vancomycin  1,000 mg Intravenous Q24H    doxycycline (VIBRAMYCIN) IV  100 mg Intravenous Q12H    ipratropium-albuterol  1 ampule Inhalation Q4H    methylPREDNISolone  40 mg Intravenous Q8H    aspirin  81 mg Oral Daily    atorvastatin  20 mg Oral Daily    insulin lispro  0-6 Units Subcutaneous TID WC    insulin lispro  0-3 Units Subcutaneous Nightly    hydrALAZINE  25 mg Oral 3 times per day    metoprolol succinate  100 mg Oral Daily    NIFEdipine  60 mg Oral Daily    pantoprazole  40 mg Oral Daily    trospium  20 mg Oral Nightly    sodium chloride flush  10 mL Intravenous 2 times per day       TELEMETRY: Sinus     Physical Exam:      Physical Exam      General:  Awake, alert, NAD  Skin:  Warm and dry  Neck:  no jvd , no carotid bruits  Chest:  Clear to auscultation, no wheezing or rales  Cardiovascular:  RRR K5N3 no murmurs, clicks, gallups, or rubs  Abdomen:  Soft nontender, nondistended, bowel sounds present  Extremities:  Edema: none       Lab Data:  CBC:   Recent Labs     03/06/21  1231 03/07/21  0306 03/08/21  0400   WBC 6.5 3.5* 6.0   HGB 11.2* 9.9* 9.7*   HCT 37.8* 33.0* 31.4*   MCV 93.1 91.9 91.0    99* 140     BMP:   Recent Labs     03/06/21  1231 03/06/21  1243 03/07/21  0306 03/08/21  0400     --  138 138   K 4.4 4.1 5.2* 4.5     --  102 100   CO2 34*  --  29 31*   BUN 29*  --  30* 40*   CREATININE 1.3*  --  1.3* 1.6*     LIVER PROFILE:   Recent Labs     03/06/21  1231   AST 16   ALT 21   BILITOT 0.5   ALKPHOS 137*     PT/INR:   Recent Labs     03/06/21  1231   PROTIME 14.4   INR 1.13     APTT:   Recent Labs     03/06/21  1231   APTT 36.0     BNP:  No results for input(s): BNP in the last 72 hours.   CK, CKMB, Troponin: @LABRCNT (CKTOTAL:3, CKMB:3, TROPONINI:3)@    IMAGING:  Echo Complete 2d W Doppler W Color    Result Date: 2/14/2021  Transthoracic Echocardiography Report (TTE)  Demographics   Patient Name  Neha Meadows  Date of Study         02/14/2021                E   MRN           [id number]            Gender                Male   Date of Birth 1940        Room Number           MHL-0711   Age           79 year(s)   Height:       72 inches         Referring Physician   lesley argueta   Weight:       232.01 pounds     Sonographer           PATRICE Clarke   BSA:          2.27 m^2          Interpreting          Richelle Adorno MD                                  Physician   BMI:          31.47 kg/m^2  Procedure Type of Study   TTE procedure:ECHO NO CONTRAST WITH DOP/COLR. Study Location: Portable Technical Quality: Adequate visualization Patient Status: Inpatient BP: 112/69 mmHg Indications:Elevated Troponin. Conclusions   Signature   ----------------------------------------------------------------  Electronically signed by Richelle Adorno MD(Interpreting physician)  on 02/14/2021 02:35 PM  ----------------------------------------------------------------   Findings   Mitral Valve  moderate mitral regurgitation is present. Aortic Valve  Structurally normal aortic valve. Tricuspid Valve  Mild tricuspid regurgitation. RVSP cannot be determined accurately   Pulmonic Valve  The pulmonic valve was not well visualized . Left Atrium  Mildly dilated left atrium. Left Ventricle  Normal left ventricular size with preserved LV function and an estimated  ejection fraction of approximately 60-65%. Grade 2 diastolic dysfunction with increased LA filling pressure   Right Atrium  Normal right atrial dimension with no evidence of thrombus or mass noted. Right Ventricle  Normal right ventricular size with preserved RV function.    Miscellaneous  IVC not studied  M-Mode Measurements (cm)   LVIDd: 4.9 cm                        LVIDs: 3.3 cm  IVSd: 1.58 cm  LVPWd: 1.37 cm                       AO Root Dimension: 2.6 cm  % Ejection Fraction: 61 % to use as little radiation as possible, without compromising image quality. REPORT: Spiral CT of the chest was performed without contrast, reconstructed coronal and sagittal images are also reviewed. COMPARISON: Portable chest x-ray to 12/20/2021. Review of lung windows demonstrates consolidative infiltrates in both lungs, on the right, this includes extensive infiltrate surrounding the right hilum, with contiguous infiltrate in the right upper lobe, with air bronchograms, there is also consolidation of the right lower lobe with air bronchograms and focal subpleural nodular infiltrates are identified in the right middle lobe and right upper lobe focally. In the left lung, there is a subpleural infiltrate in the left upper lobe along the major fissure, with mild paraseptal emphysematous changes. This is contiguous with mixed groundglass and consolidative infiltrates in the lingula and there is consolidation of the left lower lobe with air bronchograms, with associated bronchial wall thickening. A small amount of left perihilar infiltrate is also present. No pneumothorax is identified, there is a trace left pleural effusion, small right pleural effusion. No lung abscess or cavitation is seen in association with the areas of pneumonia. Soft tissue windows show a 12 mm low-attenuation lesion in the right thyroid lobe, which is most likely benign. There are normal-sized shotty appearing lymph nodes within the mediastinum without measurable lymphadenopathy. A small amount calcified plaques noted within the aortic arch and there is heavy calcified plaque within the coronary arteries. Heart size is normal. There is trace pericardial fluid. The visualized upper abdomen shows no acute abnormality. There is mild bilateral gynecomastia. Review of bone windows shows advanced degenerative changes in the visualized upper lumbar spine.     Consolidating infiltrates within both lungs, greatest in the right lower lobe and most compatible with bilateral pneumonia, there is a small right pleural effusion and trace left pleural effusion. No pneumothorax is identified. There is no lung abscess or areas of cavitation. Mild underlying changes of paraseptal emphysema are noted. No measurable intrathoracic lymphadenopathy is identified. Signed by Dr Marcin Franks. Holly on 2/13/2021 2:28 PM    Us Renal Complete    Result Date: 2/13/2021  EXAMINATION: US RENAL COMPLETE 2/13/2021 3:16 PM HISTORY: Acute kidney injury. Report: Sonographic images of the kidneys were obtained. COMPARISON: CT of the abdomen and pelvis with without contrast 7/31/2020. Ultrasound of the kidneys 10/23/2018. The right kidney measures 11.6 x 5.5 x 6.0 cm and has normal cortical echogenicity, with a cortical thickness that averages 12 mm. No solid mass is identified. There are 2 cysts in the right kidney appear benign, including one at the interpolar level measures 1.3 x 1.4 x 1.0 cm (previous measurement of 0.9 x 0.8 x 0.9 cm) and one at the inferior pole that measures 2.5 x 2.9 x 2.1 cm. This cyst previously measured 2.7 x 2.2 x 2.0 cm and is essentially unchanged Color Doppler images demonstrate vascular flow within the right kidney. The left kidney measures 10.4 x 4.5 x 4.0 cm and has normal morphology and cortical echogenicity without evidence of hydronephrosis or solid mass. There is a benign-appearing cyst at the inferior pole that measures 1 cm, not clearly seen on the previous ultrasound. Color Doppler images demonstrate vascular flow within the left kidney. Additional small cysts are seen in the kidneys on prior CT but poorly visualized on today's examination. The bladder is within normal limits. Bilateral renal cysts as detailed above, with a benign appearance. Some smaller cysts seen in the kidneys on previous CT are not visualized on today's examination No hydronephrosis is identified. Both kidneys have normal cortical echogenicity. The bladder is unremarkable. Signed by Dr Reginaldo Estrada. Holly on 2/13/2021 3:21 PM    Xr Chest Portable    Result Date: 3/7/2021  Exam: XR CHEST PORTABLE - 3/7/2021 12:14 PM Indication: Central line placement Comparison: 3/6/2021 Findings: CVL tip overlies the SVC. Cardiac silhouette is stable. No significant change in bilateral interstitial and airspace opacity, greatest in the RIGHT lower lobe. No visible pneumothorax. No acute osseous finding. Impression: 1. CVL tip overlies the SVC. 2.  No significant change in bilateral interstitial and airspace opacity, greatest in the RIGHT lower lung. Signed by Dr Bhumika Victor on 3/7/2021 1:36 PM    Xr Chest Portable    Result Date: 3/6/2021  Exam: XR CHEST PORTABLE - 3/6/2021 11:52 AM Indication: Shortness of air Comparison: 2/12/2021 Findings: Persistent and slightly increased RIGHT greater than LEFT middle and lower lung zone airspace opacity. No large pleural effusion. No pneumothorax. Cardiac silhouette is stable. No acute osseous finding. Impression: Slight increase in bilateral RIGHT greater than LEFT middle and lower lung zone airspace opacity. Signed by Dr Bhumika Victor on 3/6/2021 1:14 PM    Xr Chest Portable    Result Date: 2/12/2021  EXAMINATION: XR CHEST PORTABLE 2/12/2021 4:13 PM HISTORY: Shortness of breath, hypoxia, recent pneumonia COMPARISON: 1/18/2021 FINDINGS: The heart appears normal in size. Atherosclerotic calcifications are seen in the aorta. Bilateral airspace opacities are present with relative sparing of the upper lobes, markedly increased compared to the prior exam. There is no appreciable pneumothorax or definite pleural effusion. Bilateral pneumonia, though worse when compared to the prior exam. Follow-up PA and lateral chest radiograph to recommended in 6-8 weeks to document resolution.  Signed by Dr Myrna Witt on 2/12/2021 4:14 PM    Cta Pulmonary W Contrast    Result Date: 3/6/2021  Exam: CT chest angiography with 3D MIP images with IV contrast - 3/6/2021 12:33 PM Indication: Worsening respiratory function, history of pneumonia Comparison: 2/13/2021 DLP: 927 mGy cm. In order to have a CT radiation dose as low as reasonably achievable, Automated Exposure Control was utilized for adjustment of the mA and/or KV according to patient size. Findings: No evidence of pulmonary embolus. Main pulmonary artery is upper limits of normal in caliber. Thoracic aorta is nonaneurysmal. Atherosclerotic change in the aortic arch and coronary arteries noted. No pericardial effusion. Central airways are clear. Interlobular septal thickening. Slight interval worsening of multifocal bilateral consolidation and groundglass opacity. Disease burden is greatest in the RIGHT upper lobe. Small RIGHT greater than LEFT pleural effusions with adjacent atelectasis. No pneumothorax. Mild mediastinal lymphadenopathy similar to prior and likely reactive. 1.3 cm RIGHT thyroid nodule. No acute chest wall soft tissue abnormality. No acute osseous finding. Thoracic spine is scoliotic curvature and degenerative change. No acute osseous finding. Impression: 1. No evidence of pulmonary embolus. 2.  Slight progression of bilateral RIGHT greater than LEFT consolidation, suspicious for pneumonia. 3.  Small bilateral pleural effusions and interstitial pulmonary edema. Signed by Dr Carol Schaeffer on 3/6/2021 1:56 PM        Assessment:  1. Recurrent pneumonia  2. Diverticular disease  3. Carotid artery stenosis  4. Peripheral arterial disease  5. Osteoarthritis  6. Hyperlipidemia  7. Hypertension  8. Iron deficiency anemia  9. Gastroesophageal reflux disease  10. Acute kidney injury  11. Epistaxis  12. Bladder cancer   13. Diabetes mellitus type 2  14. ?  Nonsustained ventricular tachycardia  15. Echocardiogram 2/12/2021 ejection fraction 60 to 84% grade 2 diastolic dysfunction dilated left atrium    Plan:  1. We will need an event recorder upon discharge  2. Continue to monitor  3.  Further comments to follow Angela Clancy MD 3/8/2021 3:13 PM

## 2021-03-08 NOTE — CONSULTS
Palliative Care:  Pt is well known to palliative care team.  Recent admission in Feb d/t sepsis. Pt is current with HH. Presents to ED with SOA. Home O2 at 2L       Past Medical History:        Past Medical History:   Diagnosis Date    Acute liver failure without hepatic coma 10/23/2018    Back pain     \"with tired legs as a result\"    Bladder cancer ([facility] [address] 12/19/2018    Blood circulation, collateral     Carotid arterial disease (HCC)     recent surgery    CKD (chronic kidney disease), 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/2018    COPD with acute lower respiratory infection ([facility] [address] 2/24/2021    GERD (gastroesophageal reflux disease)     Hyperlipidemia     Hypertension     Hypertension     Palliative care patient 10/23/2018    Pneumonia due to infectious organism 11/06/2018    Primary osteoarthritis of left knee 10/14/2018    PVD (peripheral vascular disease) (HCC)     Tremor     Tremor on Right side x 1-2 weeks per stepdaughter    Type 2 diabetes mellitus with complication, without long-term current use of insulin ([facility] [address] 1/21/2021       Advance Directives:   Full Code. Has ACP documents, not on file. Pain/Other Symptoms:  Denies      Activity:  As shyla           Psychological/Spiritual:   Pt tells me he lives at home with spouse with good family support. Attends [employer] d/t [employer]. Plan:        Patient/family discussion r/t goals:  Pt reviews hx of the last 24 hrs. He tells me he had difficulty with breathing. He got up and put his O2 on when he realized his O2 sats at 85%(pulse ox). Could not breath well which prompted ED visit. Pt tells me he also had an elevated temp. VSS now. He reports he was here on rehab floor recently and felt it did him \"good\". He does not want to go home \"to early\". He tells me [facility] was seeing him for IV abx. His wife is a retired RN, pt states Chandan Mays has a little dementia\". Pt tells me he is able to amb with walker and care for his daily needs.       Review of any needed services:    Spoke with pt about having out pt palliative care follow up with him at home. Pt is open to this service. Will have CM place order.             Electronically signed by Mike Escobar RN on 3/8/2021 at 11:07 AM

## 2021-03-09 LAB
ANION GAP SERPL CALCULATED.3IONS-SCNC: 7 MMOL/L (ref 7–19)
BASOPHILS ABSOLUTE: 0 K/UL (ref 0–0.2)
BASOPHILS RELATIVE PERCENT: 0.2 % (ref 0–1)
BUN BLDV-MCNC: 46 MG/DL (ref 8–23)
CALCIUM SERPL-MCNC: 8.1 MG/DL (ref 8.8–10.2)
CHLORIDE BLD-SCNC: 99 MMOL/L (ref 98–111)
CO2: 29 MMOL/L (ref 22–29)
CREAT SERPL-MCNC: 1.7 MG/DL (ref 0.5–1.2)
EKG P AXIS: 22 DEGREES
EKG P AXIS: 33 DEGREES
EKG P AXIS: NORMAL DEGREES
EKG P-R INTERVAL: 168 MS
EKG P-R INTERVAL: 174 MS
EKG P-R INTERVAL: NORMAL MS
EKG Q-T INTERVAL: 308 MS
EKG Q-T INTERVAL: 350 MS
EKG Q-T INTERVAL: 394 MS
EKG QRS DURATION: 104 MS
EKG QRS DURATION: 110 MS
EKG QRS DURATION: 114 MS
EKG QTC CALCULATION (BAZETT): 401 MS
EKG QTC CALCULATION (BAZETT): 402 MS
EKG QTC CALCULATION (BAZETT): 432 MS
EKG T AXIS: 54 DEGREES
EKG T AXIS: 68 DEGREES
EKG T AXIS: 86 DEGREES
EOSINOPHILS ABSOLUTE: 0 K/UL (ref 0–0.6)
EOSINOPHILS RELATIVE PERCENT: 0 % (ref 0–5)
GFR AFRICAN AMERICAN: 47
GFR NON-AFRICAN AMERICAN: 39
GLUCOSE BLD-MCNC: 223 MG/DL (ref 70–99)
GLUCOSE BLD-MCNC: 234 MG/DL (ref 74–109)
GLUCOSE BLD-MCNC: 243 MG/DL (ref 70–99)
GLUCOSE BLD-MCNC: 265 MG/DL (ref 70–99)
GLUCOSE BLD-MCNC: 280 MG/DL (ref 70–99)
HCT VFR BLD CALC: 30.5 % (ref 42–52)
HEMOGLOBIN: 9.3 G/DL (ref 14–18)
IMMATURE GRANULOCYTES #: 0.1 K/UL
LYMPHOCYTES ABSOLUTE: 0.3 K/UL (ref 1.1–4.5)
LYMPHOCYTES RELATIVE PERCENT: 5.4 % (ref 20–40)
MAGNESIUM: 1.9 MG/DL (ref 1.6–2.4)
MCH RBC QN AUTO: 27.7 PG (ref 27–31)
MCHC RBC AUTO-ENTMCNC: 30.5 G/DL (ref 33–37)
MCV RBC AUTO: 90.8 FL (ref 80–94)
MONOCYTES ABSOLUTE: 0.4 K/UL (ref 0–0.9)
MONOCYTES RELATIVE PERCENT: 6.6 % (ref 0–10)
MYCOPLASMA PNEUMONIAE IGG: 0.71 U/L
MYCOPLASMA PNEUMONIAE IGM: 0 U/L
NEUTROPHILS ABSOLUTE: 5.1 K/UL (ref 1.5–7.5)
NEUTROPHILS RELATIVE PERCENT: 85.6 % (ref 50–65)
PDW BLD-RTO: 14.6 % (ref 11.5–14.5)
PERFORMED ON: ABNORMAL
PLATELET # BLD: 155 K/UL (ref 130–400)
PMV BLD AUTO: 12.2 FL (ref 9.4–12.4)
POTASSIUM SERPL-SCNC: 4.2 MMOL/L (ref 3.5–5)
RBC # BLD: 3.36 M/UL (ref 4.7–6.1)
SODIUM BLD-SCNC: 135 MMOL/L (ref 136–145)
WBC # BLD: 5.9 K/UL (ref 4.8–10.8)

## 2021-03-09 PROCEDURE — 6370000000 HC RX 637 (ALT 250 FOR IP): Performed by: INTERNAL MEDICINE

## 2021-03-09 PROCEDURE — 93010 ELECTROCARDIOGRAM REPORT: CPT | Performed by: INTERNAL MEDICINE

## 2021-03-09 PROCEDURE — 85025 COMPLETE CBC W/AUTO DIFF WBC: CPT

## 2021-03-09 PROCEDURE — 83735 ASSAY OF MAGNESIUM: CPT

## 2021-03-09 PROCEDURE — 99232 SBSQ HOSP IP/OBS MODERATE 35: CPT | Performed by: INTERNAL MEDICINE

## 2021-03-09 PROCEDURE — 6360000002 HC RX W HCPCS: Performed by: INTERNAL MEDICINE

## 2021-03-09 PROCEDURE — 2580000003 HC RX 258: Performed by: INTERNAL MEDICINE

## 2021-03-09 PROCEDURE — 2500000003 HC RX 250 WO HCPCS: Performed by: INTERNAL MEDICINE

## 2021-03-09 PROCEDURE — 80048 BASIC METABOLIC PNL TOTAL CA: CPT

## 2021-03-09 PROCEDURE — 94660 CPAP INITIATION&MGMT: CPT

## 2021-03-09 PROCEDURE — 2140000000 HC CCU INTERMEDIATE R&B

## 2021-03-09 PROCEDURE — 92522 EVALUATE SPEECH PRODUCTION: CPT

## 2021-03-09 PROCEDURE — 99233 SBSQ HOSP IP/OBS HIGH 50: CPT | Performed by: INTERNAL MEDICINE

## 2021-03-09 PROCEDURE — 2700000000 HC OXYGEN THERAPY PER DAY

## 2021-03-09 PROCEDURE — 94640 AIRWAY INHALATION TREATMENT: CPT

## 2021-03-09 PROCEDURE — 87081 CULTURE SCREEN ONLY: CPT

## 2021-03-09 PROCEDURE — 92610 EVALUATE SWALLOWING FUNCTION: CPT

## 2021-03-09 PROCEDURE — 6370000000 HC RX 637 (ALT 250 FOR IP): Performed by: HOSPITALIST

## 2021-03-09 PROCEDURE — 82947 ASSAY GLUCOSE BLOOD QUANT: CPT

## 2021-03-09 RX ORDER — CEFUROXIME AXETIL 250 MG/1
500 TABLET ORAL 2 TIMES DAILY
Status: DISCONTINUED | OUTPATIENT
Start: 2021-03-09 | End: 2021-03-13

## 2021-03-09 RX ORDER — METHYLPREDNISOLONE SODIUM SUCCINATE 40 MG/ML
40 INJECTION, POWDER, LYOPHILIZED, FOR SOLUTION INTRAMUSCULAR; INTRAVENOUS EVERY 12 HOURS
Status: DISCONTINUED | OUTPATIENT
Start: 2021-03-09 | End: 2021-03-09

## 2021-03-09 RX ORDER — AMIODARONE HYDROCHLORIDE 200 MG/1
200 TABLET ORAL 2 TIMES DAILY
Status: DISCONTINUED | OUTPATIENT
Start: 2021-03-09 | End: 2021-03-15 | Stop reason: HOSPADM

## 2021-03-09 RX ORDER — PREDNISONE 1 MG/1
5 TABLET ORAL DAILY
Status: DISCONTINUED | OUTPATIENT
Start: 2021-03-18 | End: 2021-03-15 | Stop reason: HOSPADM

## 2021-03-09 RX ORDER — PREDNISONE 20 MG/1
10 TABLET ORAL DAILY
Status: DISCONTINUED | OUTPATIENT
Start: 2021-03-15 | End: 2021-03-15 | Stop reason: HOSPADM

## 2021-03-09 RX ORDER — PREDNISONE 20 MG/1
20 TABLET ORAL DAILY
Status: COMPLETED | OUTPATIENT
Start: 2021-03-09 | End: 2021-03-11

## 2021-03-09 RX ORDER — SODIUM CHLORIDE 9 MG/ML
INJECTION, SOLUTION INTRAVENOUS CONTINUOUS
Status: DISCONTINUED | OUTPATIENT
Start: 2021-03-09 | End: 2021-03-10 | Stop reason: SDUPTHER

## 2021-03-09 RX ADMIN — SODIUM CHLORIDE, PRESERVATIVE FREE 10 ML: 5 INJECTION INTRAVENOUS at 07:27

## 2021-03-09 RX ADMIN — INSULIN LISPRO 3 UNITS: 100 INJECTION, SOLUTION INTRAVENOUS; SUBCUTANEOUS at 07:18

## 2021-03-09 RX ADMIN — ASPIRIN 81 MG: 81 TABLET, FILM COATED ORAL at 07:26

## 2021-03-09 RX ADMIN — SODIUM CHLORIDE: 9 INJECTION, SOLUTION INTRAVENOUS at 21:45

## 2021-03-09 RX ADMIN — HYDRALAZINE HYDROCHLORIDE 25 MG: 25 TABLET, FILM COATED ORAL at 14:16

## 2021-03-09 RX ADMIN — IPRATROPIUM BROMIDE AND ALBUTEROL SULFATE 1 AMPULE: 2.5; .5 SOLUTION RESPIRATORY (INHALATION) at 02:06

## 2021-03-09 RX ADMIN — MICONAZOLE NITRATE: 2 POWDER TOPICAL at 21:44

## 2021-03-09 RX ADMIN — ENOXAPARIN SODIUM 100 MG: 100 INJECTION SUBCUTANEOUS at 01:40

## 2021-03-09 RX ADMIN — ENOXAPARIN SODIUM 100 MG: 100 INJECTION SUBCUTANEOUS at 14:16

## 2021-03-09 RX ADMIN — IPRATROPIUM BROMIDE AND ALBUTEROL SULFATE 1 AMPULE: 2.5; .5 SOLUTION RESPIRATORY (INHALATION) at 14:23

## 2021-03-09 RX ADMIN — INSULIN LISPRO 2 UNITS: 100 INJECTION, SOLUTION INTRAVENOUS; SUBCUTANEOUS at 16:14

## 2021-03-09 RX ADMIN — TROSPIUM CHLORIDE 20 MG: 20 TABLET, FILM COATED ORAL at 21:44

## 2021-03-09 RX ADMIN — AMIODARONE HYDROCHLORIDE 200 MG: 200 TABLET ORAL at 09:51

## 2021-03-09 RX ADMIN — MICONAZOLE NITRATE: 2 POWDER TOPICAL at 16:14

## 2021-03-09 RX ADMIN — CEFUROXIME AXETIL 500 MG: 250 TABLET ORAL at 14:16

## 2021-03-09 RX ADMIN — METOPROLOL SUCCINATE 100 MG: 50 TABLET, EXTENDED RELEASE ORAL at 07:26

## 2021-03-09 RX ADMIN — CEFUROXIME AXETIL 500 MG: 250 TABLET ORAL at 21:44

## 2021-03-09 RX ADMIN — NIFEDIPINE 60 MG: 60 TABLET, FILM COATED, EXTENDED RELEASE ORAL at 07:26

## 2021-03-09 RX ADMIN — METHYLPREDNISOLONE SODIUM SUCCINATE 40 MG: 40 INJECTION, POWDER, FOR SOLUTION INTRAMUSCULAR; INTRAVENOUS at 05:48

## 2021-03-09 RX ADMIN — PREDNISONE 20 MG: 20 TABLET ORAL at 12:23

## 2021-03-09 RX ADMIN — ATORVASTATIN CALCIUM 20 MG: 20 TABLET, FILM COATED ORAL at 07:27

## 2021-03-09 RX ADMIN — MICONAZOLE NITRATE: 2 POWDER TOPICAL at 07:27

## 2021-03-09 RX ADMIN — IPRATROPIUM BROMIDE AND ALBUTEROL SULFATE 1 AMPULE: 2.5; .5 SOLUTION RESPIRATORY (INHALATION) at 19:00

## 2021-03-09 RX ADMIN — DOXYCYCLINE 100 MG: 100 INJECTION, POWDER, LYOPHILIZED, FOR SOLUTION INTRAVENOUS at 04:59

## 2021-03-09 RX ADMIN — MEROPENEM 1000 MG: 1 INJECTION, POWDER, FOR SOLUTION INTRAVENOUS at 05:49

## 2021-03-09 RX ADMIN — SODIUM CHLORIDE, PRESERVATIVE FREE 10 ML: 5 INJECTION INTRAVENOUS at 21:44

## 2021-03-09 RX ADMIN — INSULIN LISPRO 3 UNITS: 100 INJECTION, SOLUTION INTRAVENOUS; SUBCUTANEOUS at 12:17

## 2021-03-09 RX ADMIN — IPRATROPIUM BROMIDE AND ALBUTEROL SULFATE 1 AMPULE: 2.5; .5 SOLUTION RESPIRATORY (INHALATION) at 10:45

## 2021-03-09 RX ADMIN — AMIODARONE HYDROCHLORIDE 0.5 MG/MIN: 50 INJECTION, SOLUTION INTRAVENOUS at 01:38

## 2021-03-09 RX ADMIN — AMIODARONE HYDROCHLORIDE 200 MG: 200 TABLET ORAL at 21:44

## 2021-03-09 RX ADMIN — SODIUM CHLORIDE: 9 INJECTION, SOLUTION INTRAVENOUS at 04:56

## 2021-03-09 RX ADMIN — HYDRALAZINE HYDROCHLORIDE 25 MG: 25 TABLET, FILM COATED ORAL at 05:48

## 2021-03-09 RX ADMIN — TAMSULOSIN HYDROCHLORIDE 0.4 MG: 0.4 CAPSULE ORAL at 07:26

## 2021-03-09 RX ADMIN — IPRATROPIUM BROMIDE AND ALBUTEROL SULFATE 1 AMPULE: 2.5; .5 SOLUTION RESPIRATORY (INHALATION) at 06:32

## 2021-03-09 RX ADMIN — PANTOPRAZOLE SODIUM 40 MG: 40 TABLET, DELAYED RELEASE ORAL at 07:26

## 2021-03-09 RX ADMIN — TAMSULOSIN HYDROCHLORIDE 0.4 MG: 0.4 CAPSULE ORAL at 21:44

## 2021-03-09 RX ADMIN — MICONAZOLE NITRATE: 2 POWDER TOPICAL at 12:20

## 2021-03-09 RX ADMIN — IPRATROPIUM BROMIDE AND ALBUTEROL SULFATE 1 AMPULE: 2.5; .5 SOLUTION RESPIRATORY (INHALATION) at 22:31

## 2021-03-09 ASSESSMENT — PAIN SCALES - GENERAL: PAINLEVEL_OUTOF10: 0

## 2021-03-09 NOTE — PROGRESS NOTES
Hospitalist Progress Note  [facility]     Patient: Julio C Crouch  : 1940  MRN: [id number]  Code Status: Full Code    Hospital Day: 3   Date of Service: 3/9/2021    Subjective:   Pt seen and examined. Clinically improving. No current complaints. No acute distress. Past Medical History:   Diagnosis Date    Acute liver failure without hepatic coma 10/23/2018    Back pain     \"with tired legs as a result\"    Bladder cancer ([facility] [address] 2018    Blood circulation, collateral     Carotid arterial disease (HCC)     recent surgery    CKD (chronic kidney disease), 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/2018    COPD with acute lower respiratory infection ([address] 2021    GERD (gastroesophageal reflux disease)     Hyperlipidemia     Hypertension     Hypertension     Palliative care patient 10/23/2018    Pneumonia due to infectious organism 2018    Primary osteoarthritis of left knee 10/14/2018    PVD (peripheral vascular disease) (HCC)     Tremor     Tremor on Right side x 1-2 weeks per stepdaughter    Type 2 diabetes mellitus with complication, without long-term current use of insulin ([facility] [address] 2021       Past Surgical History:   Procedure Laterality Date    BACK SURGERY      COLONOSCOPY  ?     CYSTOSCOPY Bilateral 2018    CYSTOSCOPY, BIOSPY FULGURATION OF BLADDER TUMOR POSSIBLE TURBT, RETROGRADE PYELOGRAM performed by Chepe Medina MD [facility] COLONOSCOPY FLX DX W/COLLJ SPEC WHEN PFRMD N/A 2017    Dr Browne July internal hemorrhoids, diverticular disease-HP-No recall (age)   [facility] ID REVISE MEDIAN N/CARPAL TUNNEL SURG Left 2018    OPEN CARPAL TUNNEL RELEASE performed by Higinio Bull MD at [address] Left 2018    LEFT CAROTID ENDARTERECTOMY WITH VEIN PATCH ANGIOPLASTY AND COMPLETION ANGIOGRAM performed by Zhang Altamirano MD at [place] [id number] Left 10/15/2018    LEFT COMPLEX TOTAL KNEE ARTHROPLASTY performed by Kelsey Arroyo MD at [facility] VASCULAR SURGERY  04/21/2015    Mona BESS Ultrasound guided access of left common femoral artery. Aortogram.Diagnostic right lower extremity arteriogram.Radiologic supervision and interpretation.  VASCULAR SURGERY  01/13/2015    Mona Beth M.D Atherectomy,angioplasty,and stenting of left superficial femoral artery.  VASCULAR SURGERY  03/11/2014    Mona Beth M.D. Ultrasound-guided access of right common femoral artery. Aortogram.Left lower extremity arteriogram.Atherectomy and angioplasty of left superficial femoral artery. Radiologic supervision and interpretation.  VASCULAR SURGERY  01/18/2013    Mona BESS Aortogram.Multistation arteriogram right lower extremity. Laser atherectomy and angioplasty of right superficial femoral artery. Selective catheterization of right tibioperoneal trunk. Angioplasty of peroneal artery and tibioperoneal trunk.  VASCULAR SURGERY  10/30/2018    [facility]. Ultrasound guided cannulation of right internal vein. Placement of right internal jugular vein tunneled dialysis catheter bard equistream xk 23cm tip to cuff    VASCULAR SURGERY  12/17/2018    SJS. Removal of tunneled dilaysis catheter right internal jugular vein.        Family History   Problem Relation Age of Onset    Colon Cancer Father     Diabetes Brother     Colon Polyps Neg Hx     Liver Cancer Neg Hx     Liver Disease Neg Hx     Esophageal Cancer Neg Hx     Rectal Cancer Neg Hx     Stomach Cancer Neg Hx        Social History     Socioeconomic History    Marital status:      Spouse name: Not on file    Number of children: Not on file    Years of education: Not on file    Highest education level: Not on file   Occupational History    Not on file   Social Needs    Financial resource strain: Not on file    Food insecurity     Worry: Not on file     Inability: Not on file   [place] needs     Medical: Not on file     Non-medical: Not on file   Tobacco Use    Smoking status: Former Smoker     Quit date: 6/3/2003     Years since quittin.7    Smokeless tobacco: Never Used   Substance and Sexual Activity    Alcohol use:  Yes     Alcohol/week: 12.0 standard drinks     Types: 12 Glasses of wine per week     Comment: 2 glasses of wine every night    Drug use: No    Sexual activity: Yes     Partners: Female   Lifestyle    Physical activity     Days per week: Not on file     Minutes per session: Not on file    Stress: Not on file   Relationships    Social connections     Talks on phone: Not on file     Gets together: Not on file     Attends Jewish service: Not on file     Active member of club or organization: Not on file     Attends meetings of clubs or organizations: Not on file     Relationship status: Not on file    Intimate partner violence     Fear of current or ex partner: Not on file     Emotionally abused: Not on file     Physically abused: Not on file     Forced sexual activity: Not on file   Other Topics Concern    Not on file   Social History Narrative    Not on file       Current Facility-Administered Medications   Medication Dose Route Frequency Provider Last Rate Last Admin    miconazole (MICOTIN) 2 % powder   Topical 4x Daily Sheron Pringle MD   Given at 21 0727    0.9 % sodium chloride infusion   Intravenous Continuous Sheron Pringle MD 75 mL/hr at 21 0456 New Bag at 21 0456    meropenem (MERREM) 1,000 mg in sterile water 20 mL IV syringe  1,000 mg Intravenous Q12H Sheron Pringle MD   1,000 mg at 21 0549    tamsulosin (FLOMAX) capsule 0.4 mg  0.4 mg Oral BID James Boudreaux MD   0.4 mg at 21 0726    amiodarone (CORDARONE) 450 mg in dextrose 5 % 250 mL infusion  0.5 mg/min Intravenous Continuous Sheron Pringle MD 16.7 mL/hr at 21 0138 0.5 mg/min at 21 0138    enoxaparin (LOVENOX) injection 100 mg  1 mg/kg Subcutaneous BID Choco Olivares MD   100 mg at 03/09/21 0140    atropine injection 1 mg  1 mg Intravenous Once Choco Olivares MD   Stopped at 03/07/21 1519    [Held by provider] dilTIAZem (CARDIZEM) tablet 30 mg  30 mg Oral 3 times per day Choco Olivares MD   Stopped at 03/07/21 2043    vancomycin (VANCOCIN) intermittent dosing (placeholder)   Other RX Placeholder Tejas Velásquez MD        vancomycin (VANCOCIN) 1000 mg in dextrose 5% 250 mL IVPB  1,000 mg Intravenous Q24H Teajs Velásquez MD   Stopped at 03/08/21 1745    doxycycline (VIBRAMYCIN) 100 mg in dextrose 5 % 100 mL IVPB  100 mg Intravenous Q12H Ana Maria Ramírez MD   Stopped at 03/09/21 0559    labetalol (NORMODYNE;TRANDATE) injection 20 mg  20 mg Intravenous Q4H PRN Ana Maria Ramírez MD        ipratropium-albuterol (DUONEB) nebulizer solution 1 ampule  1 ampule Inhalation Q4H Ana Maria Ramírez MD   1 ampule at 03/09/21 3114    methylPREDNISolone sodium (SOLU-MEDROL) injection 40 mg  40 mg Intravenous Q8H Ana Maria Ramírez MD   40 mg at 03/09/21 0548    aspirin EC tablet 81 mg  81 mg Oral Daily Ana Maria Ramírez MD   81 mg at 03/09/21 0726    atorvastatin (LIPITOR) tablet 20 mg  20 mg Oral Daily Ana Maria Ramírez MD   20 mg at 03/09/21 3576    calcium carbonate (TUMS) chewable tablet 500 mg  500 mg Oral TID PRN Ana Maria Ramírez MD        insulin lispro (HUMALOG) injection vial 0-6 Units  0-6 Units Subcutaneous TID WC Ana Maria Ramírez MD   3 Units at 03/09/21 3467    insulin lispro (HUMALOG) injection vial 0-3 Units  0-3 Units Subcutaneous Nightly Ana Maria Ramírez MD   2 Units at 03/08/21 2016    guaiFENesin (ROBITUSSIN) 100 MG/5ML liquid 200 mg  200 mg Oral Q4H PRN Ana Maria Ramírez MD        hydrALAZINE (APRESOLINE) tablet 25 mg  25 mg Oral 3 times per day Ana Maria Ramírez MD   25 mg at 03/09/21 0548    metoprolol succinate (TOPROL XL) extended release tablet 100 mg  100 mg Oral Daily Ana Maria Ramírez MD   100 mg at 03/09/21 0726    NIFEdipine (PROCARDIA XL) extended release tablet 60 mg  60 mg Oral Daily Helena Goodell, MD   60 mg at 03/09/21 0726    pantoprazole (PROTONIX) tablet 40 mg  40 mg Oral Daily Helena Goodell, MD   40 mg at 03/09/21 7984    trospium (SANCTURA) tablet 20 mg  20 mg Oral Nightly Helena Goodell, MD   20 mg at 03/08/21 2025    sodium chloride flush 0.9 % injection 10 mL  10 mL Intravenous 2 times per day Helena Goodell, MD   10 mL at 03/09/21 0727    sodium chloride flush 0.9 % injection 10 mL  10 mL Intravenous PRN Helena Goodell, MD        polyethylene glycol [facility]) packet 17 g  17 g Oral Daily PRN Helena Goodell, MD        acetaminophen (TYLENOL) tablet 650 mg  650 mg Oral Q6H PRN Helena Goodell, MD        Or    acetaminophen (TYLENOL) suppository 650 mg  650 mg Rectal Q6H PRN Helena Goodell, MD        glucose (GLUTOSE) 40 % oral gel 15 g  15 g Oral PRN Helena Goodell, MD        dextrose 50 % IV solution  12.5 g Intravenous PRN Helena Goodell, MD        glucagon (rDNA) injection 1 mg  1 mg Intramuscular PRN Helena Goodell, MD        dextrose 5 % solution  100 mL/hr Intravenous PRN Helena Goodell, MD             sodium chloride 75 mL/hr at 03/09/21 0456    amiodarone 0.5 mg/min (03/09/21 0138)    dextrose          Objective:   BP (!) 143/62   Pulse 76   Temp 97.5 °F (36.4 °C) (Temporal)   Resp 24   Ht 6' (1.829 m)   Wt 238 lb 11.2 oz (108.3 kg)   SpO2 94%   BMI 32.37 kg/m²     General: no acute distress  HEENT: normocephalic, atraumatic  Neck: supple, symmetrical, trachea midline   Lungs: improving bilateral rhonchi  Cardiovascular: s1 and s2 normal  Abdomen: soft, positive bowel sounds  Extremities: no cyanosis   Neuro: aaox3, no acute focal motor deficits   Skin: normal color and texture    Recent Labs     03/07/21  0306 03/08/21  0400 03/09/21  0518   WBC 3.5* 6.0 5.9   RBC 3.59* 3.45* 3.36*   HGB 9.9* 9.7* 9.3*   HCT 33.0* 31.4* 30.5*   MCV 91.9 91.0 90.8   MCH 27.6 28.1 27.7   MCHC 30.0* 30.9* 30.5*   PLT 99* 140 155     Recent Labs Signature   ----------------------------------------------------------------  Electronically signed by Tahir Loo MD(Interpreting physician)  on 03/08/2021 10:13 PM  ----------------------------------------------------------------  M-Mode Measurements (cm)   LVIDd: 4.48 cm                                  LVIDs: 3.01 cm  IVSd: 1.6 cm  LVPWd: 1.46 cm  % Ejection Fraction: 65 %      Xr Chest Portable    Result Date: 3/7/2021  Exam: XR CHEST PORTABLE - 3/7/2021 12:14 PM Indication: Central line placement Comparison: 3/6/2021 Findings: CVL tip overlies the SVC. Cardiac silhouette is stable. No significant change in bilateral interstitial and airspace opacity, greatest in the RIGHT lower lobe. No visible pneumothorax. No acute osseous finding. Impression: 1. CVL tip overlies the SVC. 2.  No significant change in bilateral interstitial and airspace opacity, greatest in the RIGHT lower lung.  Signed by Dr Flores Zaragoza on 3/7/2021 1:36 PM       Assessment and Plan:   Recurrent versus nonresolving CAP with high risk for MDR pathogens  Multiple prior admissions for the same, required intubation 1/2021  CTA chest 3/6/2021: Bilateral pneumonia, no PE  Broad-spectrum antibiotics  Follow cultures  Lactate 0.6  Molecular respiratory panel negative  TTE 3/8/2021: EF 60-65%, LV and RV appear normal  Pulmonary following  Patient will require routine outpatient pulmonary/cardiology follow-up     COPD exacerbation  Steroids, tapering  Nebs  Antibiotics  Goal sats 88-92%     Acute hypoxemic and hypercapnic respiratory failure  Secondary to above processes  BiPAP since weaned off  Currently on 2 L via NC, decreasing requirement  Follow ABG/CXR  Oximetry studies as warranted     NSVT  Multiple episodes 3/7/2021  Asymptomatic  Hemodynamically stable  Alert and oriented x3  Amiodarone gtt  Metoprolol/nifedipine already on board, caution sinus pauses  Shock as warranted  Follow lytes  Serial troponin 0.03 > 0.02 > 0.02  Follow EKG Continue telemetry monitoring  TTE 3/8/2021: EF 60-65%, LV and RV appear normal  Cardiology following, recs event recorder upon discharge  Patient will require routine outpatient cardiology/pulmonary follow-up     Chronically elevated troponin  Denies chest pain  Current level lower than multiple prior levels     Prior tobacco dependence  Counseled on continued cessation     CKD 3  Follow renal function/urine output  Avoid offending agents  Previously required RRT for 3.5 months     DM2  Meds on board     DVT prophylaxis  Lovenox    Total critical care time: 47 minutes    Jazmin Tate MD   3/9/2021 9:15 AM

## 2021-03-09 NOTE — PROGRESS NOTES
Cardiology Daily Note Vero Chopra MD      Patient:  Giacomo Zhang  [id number]    Patient Active Problem List    Diagnosis Date Noted    Recurrent pneumonia 03/06/2021     Priority: Low    COPD with acute lower respiratory infection ([address] 02/24/2021     Priority: Low    CKD (chronic kidney disease) stage 3, GFR 30-59 ml/min 02/24/2021     Priority: Low    Low back pain 01/22/2021     Priority: Low    Acute respiratory failure ([address] 01/21/2021     Priority: Low    Type 2 diabetes mellitus with complication, without long-term current use of insulin ([address] 01/21/2021     Priority: Low    Weakness 01/21/2021     Priority: Low    Other dysphagia 01/21/2021     Priority: Low    Severe sepsis ([address] 01/18/2021     Priority: Low    Acute on chronic respiratory failure ([address] 01/18/2021     Priority: Low    Acute on chronic kidney failure ([address] 01/18/2021     Priority: Low    Hypoxemia 01/18/2021     Priority: Low    Metabolic acidosis 02/37/3784     Priority: Low    Acidosis, metabolic, with respiratory acidosis 01/18/2021     Priority: Low    History of bladder cancer 06/15/2020     Priority: Low    Bladder cancer ([address] 12/19/2018     Priority: Low    Postoperative pain 12/19/2018     Priority: Low    Epistaxis 11/06/2018     Priority: Low    Acute blood loss anemia 11/06/2018     Priority: Low    Melena 11/06/2018     Priority: Low    Hypocalcemia 11/06/2018     Priority: Low    Pneumonia due to infectious organism 11/06/2018     Priority: Low    Bleeding diathesis ([address] 11/05/2018     Priority: Low    BPH associated with nocturia      Priority: Low    Acute renal failure ([address]      Priority: Low    Shock liver      Priority: Low    Acute liver failure without hepatic coma 10/23/2018     Priority: Low    Acute kidney injury ([address] 10/23/2018     Priority: Low    CHF (congestive heart failure) ([address] 10/23/2018     Priority: Low    Bilateral pleural effusion 10/23/2018     Priority: Low    Elevated troponin 10/23/2018     Priority: Low    Palliative care patient 10/23/2018     Priority: Low    Arthritis of knee 10/15/2018     Priority: Low    Essential hypertension 10/15/2018     Priority: Low    Pure hypercholesterolemia 10/15/2018     Priority: Low    Slow transit constipation 10/15/2018     Priority: Low    Iron deficiency anemia 10/15/2018     Priority: Low    GERD (gastroesophageal reflux disease) 10/15/2018     Priority: Low    Primary osteoarthritis of left knee 10/14/2018     Priority: Low    Carotid stenosis, asymptomatic, left 08/28/2018     Priority: Low    Bilateral carotid artery stenosis 06/25/2018     Priority: Low    Atherosclerosis of native artery of both lower extremities with intermittent claudication ([facility] 06/25/2018     Priority: Low    Colonic diverticular abscess 08/01/2017     Priority: Low    Generalized abdominal pain      Priority: Low    Diverticulitis of large intestine with perforation without bleeding 06/04/2017     Priority: Low       Admit Date:  3/6/2021    Admission Problem List: Present on Admission:   Recurrent pneumonia      Cardiac Specific Data:  Specialty Problems        Cardiology Problems    Atherosclerosis of native artery of both lower extremities with intermittent claudication (HCC)        Bilateral carotid artery stenosis        Carotid stenosis, asymptomatic, left        Essential hypertension        Pure hypercholesterolemia        CHF (congestive heart failure) ([facility]              Subjective:  Mr. Sienna Trevino seen today resting comfortably. Denies chest pain denies dyspnea. Rhythm sinus rhythm on the monitor. Rhythm strips reviewed consistent with nonsustained VT also had a 5.70-second pause on the evening between 9 and 10 3/7/2021. Blood pressure 150/58 heart 78.     Objective:   BP (!) 150/58   Pulse 78   Temp 97.5 °F (36.4 °C) (Temporal)   Resp 20   Ht 6' (1.829 m)   Wt 238 lb 11.2 oz (108.3 kg)   SpO2 94%   BMI 32.37 kg/m² Intake/Output Summary (Last 24 hours) at 3/9/2021 1055  Last data filed at 3/9/2021 1016  Gross per 24 hour   Intake 3789 ml   Output 1130 ml   Net 2659 ml       Prior to Admission medications    Medication Sig Start Date End Date Taking?  Authorizing Provider   calcium carbonate (TUMS) 500 MG chewable tablet Take 1 tablet by mouth 3 times daily as needed for Heartburn 2/24/21 3/26/21  [facility], DO   hydrALAZINE (APRESOLINE) 25 MG tablet Take 1 tablet by mouth every 8 hours 2/24/21   Claude Shay Ward, DO   guaiFENesin (ROBITUSSIN) 100 MG/5ML syrup Take 10 mLs by mouth every 4 hours as needed for Cough 2/24/21   Claude Shay Ward, DO   famotidine (PEPCID) 20 MG tablet Take 1 tablet by mouth daily 2/24/21   [facility], DO   aspirin 81 MG EC tablet Take 1 tablet by mouth daily 1/30/21   Justa Jacob MD   glipiZIDE (GLUCOTROL) 5 MG tablet Take 1 tablet by mouth every morning (before breakfast) 1/30/21   Justa Jacob MD   atorvastatin (LIPITOR) 20 MG tablet Take 1 tablet by mouth daily 1/29/21   Justa Jacob MD   metoprolol succinate (TOPROL XL) 100 MG extended release tablet Take 1 tablet by mouth daily 1/30/21   Justa Jacob MD   NIFEdipine (ADALAT CC) 60 MG extended release tablet Take 1 tablet by mouth daily 1/30/21   Justa Jacob MD   tamsulosin (FLOMAX) 0.4 MG capsule Take 1 capsule by mouth daily 1/30/21   Justa Jacob MD   pantoprazole (PROTONIX) 40 MG tablet Take 1 tablet by mouth daily 1/30/21   Justa Jacob MD   trospium (SANCTURA) 20 MG tablet Take 1 tablet by mouth nightly 1/29/21   Justa Jacob MD        methylPREDNISolone  40 mg Intravenous Q12H    amiodarone  200 mg Oral BID    miconazole   Topical 4x Daily    meropenem (MERREM) 1000 mg in SWFI 20 mL IV syringe  1,000 mg Intravenous Q12H    tamsulosin  0.4 mg Oral BID    enoxaparin  1 mg/kg Subcutaneous BID    atropine  1 mg Intravenous Once    [Held by provider] dilTIAZem  30 mg Oral 3 times per day    vancomycin (VANCOCIN) intermittent dosing (placeholder)   Other RX Placeholder    vancomycin  1,000 mg Intravenous Q24H    doxycycline (VIBRAMYCIN) IV  100 mg Intravenous Q12H    ipratropium-albuterol  1 ampule Inhalation Q4H    aspirin  81 mg Oral Daily    atorvastatin  20 mg Oral Daily    insulin lispro  0-6 Units Subcutaneous TID WC    insulin lispro  0-3 Units Subcutaneous Nightly    hydrALAZINE  25 mg Oral 3 times per day    metoprolol succinate  100 mg Oral Daily    NIFEdipine  60 mg Oral Daily    pantoprazole  40 mg Oral Daily    trospium  20 mg Oral Nightly    sodium chloride flush  10 mL Intravenous 2 times per day       TELEMETRY: Sinus     Physical Exam:      Physical Exam      General:  Awake, alert, NAD  Skin:  Warm and dry  Neck:  no jvd , no carotid bruits  Chest:  Clear to auscultation, no wheezing or rales  Cardiovascular:  RRR Z0P1 no murmurs, clicks, gallups, or rubs  Abdomen:  Soft nontender, nondistended, bowel sounds present  Extremities:  Edema: none    Lab Data:  CBC:   Recent Labs     03/07/21  0306 03/08/21  0400 03/09/21  0518   WBC 3.5* 6.0 5.9   HGB 9.9* 9.7* 9.3*   HCT 33.0* 31.4* 30.5*   MCV 91.9 91.0 90.8   PLT 99* 140 155     BMP:   Recent Labs     03/07/21  0306 03/08/21  0400 03/09/21  0518    138 135*   K 5.2* 4.5 4.2    100 99   CO2 29 31* 29   BUN 30* 40* 46*   CREATININE 1.3* 1.6* 1.7*     LIVER PROFILE:   Recent Labs     03/06/21  1231   AST 16   ALT 21   BILITOT 0.5   ALKPHOS 137*     PT/INR:   Recent Labs     03/06/21  1231   PROTIME 14.4   INR 1.13     APTT:   Recent Labs     03/06/21  1231   APTT 36.0     BNP:  No results for input(s): BNP in the last 72 hours.   CK, CKMB, Troponin: @LABRCNT (CKTOTAL:3, CKMB:3, TROPONINI:3)@    IMAGING:  Echo Complete 2d W Doppler W Color    Result Date: 3/8/2021  Transthoracic Echocardiography Report (TTE)  Demographics   Patient Name  Edin Richmond  Date of Study         03/08/2021                E   MRN           [id number]            Gender Male   Date of Birth 1940        Room Number           [id number]   Age           79 year(s)   Height:       72 inches         Referring Physician   Promise Jiang   Weight:       237.01 pounds     Sonographer           [employer], PATRICE Jurado   BSA:          2.29 m^2          Interpreting          Andriy Loo MD                                  Physician   BMI:          32.14 kg/m^2  Procedure Type of Study   TTE procedure:ECHO 2D W/DOPPLER/CONTRAST LIMITD. Study Location: Portable Technical Quality: Adequate visualization Patient Status: Inpatient Contrast Medium: Definity. Amount - 3 ml BP: 129/71 mmHg  Conclusions   Summary  Limited echocardiographic study. LV appears normal in size with preserved LV systolic function. LV ejection  fraction estimated at 60-65%. RV not well visualized but appears normal in size with preserved systolic  function. .   Signature   ----------------------------------------------------------------  Electronically signed by Destinee Loo MD(Interpreting physician)  on 03/08/2021 10:13 PM  ----------------------------------------------------------------  M-Mode Measurements (cm)   LVIDd: 4.48 cm                                  LVIDs: 3.01 cm  IVSd: 1.6 cm  LVPWd: 1.46 cm  % Ejection Fraction: 65 %      Echo Complete 2d W Doppler W Color    Result Date: 2/14/2021  Transthoracic Echocardiography Report (TTE)  Demographics   Patient Name  Durene Moritz  Date of Study         02/14/2021                E   MRN           [id number]            Gender                Male   Date of Birth 1940        Room Number           [id number]   Age           79 year(s)   Height:       72 inches         Referring Physician   lesley argueta   Weight:       232.01 pounds     Sonographer           [employer], PATRICE Jurado   BSA:          2.27 m^2          Interpreting          Monty Molina MD                                  Physician   BMI:          31.47 kg/m^2  Procedure Type of Study   TTE procedure:ECHO NO CONTRAST WITH DOP/COLR. Study Location: Portable Technical Quality: Adequate visualization Patient Status: Inpatient BP: 112/69 mmHg Indications:Elevated Troponin. Conclusions   Signature   ----------------------------------------------------------------  Electronically signed by Emilee Ga MD(Interpreting physician)  on 02/14/2021 02:35 PM  ----------------------------------------------------------------   Findings   Mitral Valve  moderate mitral regurgitation is present. Aortic Valve  Structurally normal aortic valve. Tricuspid Valve  Mild tricuspid regurgitation. RVSP cannot be determined accurately   Pulmonic Valve  The pulmonic valve was not well visualized . Left Atrium  Mildly dilated left atrium. Left Ventricle  Normal left ventricular size with preserved LV function and an estimated  ejection fraction of approximately 60-65%. Grade 2 diastolic dysfunction with increased LA filling pressure   Right Atrium  Normal right atrial dimension with no evidence of thrombus or mass noted. Right Ventricle  Normal right ventricular size with preserved RV function. Miscellaneous  IVC not studied  M-Mode Measurements (cm)   LVIDd: 4.9 cm                        LVIDs: 3.3 cm  IVSd: 1.58 cm  LVPWd: 1.37 cm                       AO Root Dimension: 2.6 cm  % Ejection Fraction: 61 %            LA: 3.8 cm                                       LVOT: 2.2 cm  Doppler Measurements:   AV Peak Gradient: 9.73 mmHg        MV Peak E-Wave: 107 cm/s                                     MV Peak A-Wave: 97.3 cm/s  TR Velocity:197 cm/s               MV E/A Ratio: 1.1 %  TR Gradient:15.52 mmHg             MV Peak Gradient: 4.58 mmHg  Estimated RAP:5 mmHg               MV P1/2t: 47 msec  RVSP:21 mmHg                       MVA by PHT4.68 cm^2      Ct Head Wo Contrast    Result Date: 2/18/2021  Examination. CT HEAD WO CONTRAST 2/18/2021 12:57 PM History: The patient complains of dizziness. DLP: 760 mGycm.  The CT scan of the head is performed lobe focally. In the left lung, there is a subpleural infiltrate in the left upper lobe along the major fissure, with mild paraseptal emphysematous changes. This is contiguous with mixed groundglass and consolidative infiltrates in the lingula and there is consolidation of the left lower lobe with air bronchograms, with associated bronchial wall thickening. A small amount of left perihilar infiltrate is also present. No pneumothorax is identified, there is a trace left pleural effusion, small right pleural effusion. No lung abscess or cavitation is seen in association with the areas of pneumonia. Soft tissue windows show a 12 mm low-attenuation lesion in the right thyroid lobe, which is most likely benign. There are normal-sized shotty appearing lymph nodes within the mediastinum without measurable lymphadenopathy. A small amount calcified plaques noted within the aortic arch and there is heavy calcified plaque within the coronary arteries. Heart size is normal. There is trace pericardial fluid. The visualized upper abdomen shows no acute abnormality. There is mild bilateral gynecomastia. Review of bone windows shows advanced degenerative changes in the visualized upper lumbar spine. Consolidating infiltrates within both lungs, greatest in the right lower lobe and most compatible with bilateral pneumonia, there is a small right pleural effusion and trace left pleural effusion. No pneumothorax is identified. There is no lung abscess or areas of cavitation. Mild underlying changes of paraseptal emphysema are noted. No measurable intrathoracic lymphadenopathy is identified. Signed by Dr Sana Barrera on 2/13/2021 2:28 PM    Us Renal Complete    Result Date: 2/13/2021  EXAMINATION: US RENAL COMPLETE 2/13/2021 3:16 PM HISTORY: Acute kidney injury. Report: Sonographic images of the kidneys were obtained. COMPARISON: CT of the abdomen and pelvis with without contrast 7/31/2020. Ultrasound of the kidneys 10/23/2018. The right kidney measures 11.6 x 5.5 x 6.0 cm and has normal cortical echogenicity, with a cortical thickness that averages 12 mm. No solid mass is identified. There are 2 cysts in the right kidney appear benign, including one at the interpolar level measures 1.3 x 1.4 x 1.0 cm (previous measurement of 0.9 x 0.8 x 0.9 cm) and one at the inferior pole that measures 2.5 x 2.9 x 2.1 cm. This cyst previously measured 2.7 x 2.2 x 2.0 cm and is essentially unchanged Color Doppler images demonstrate vascular flow within the right kidney. The left kidney measures 10.4 x 4.5 x 4.0 cm and has normal morphology and cortical echogenicity without evidence of hydronephrosis or solid mass. There is a benign-appearing cyst at the inferior pole that measures 1 cm, not clearly seen on the previous ultrasound. Color Doppler images demonstrate vascular flow within the left kidney. Additional small cysts are seen in the kidneys on prior CT but poorly visualized on today's examination. The bladder is within normal limits. Bilateral renal cysts as detailed above, with a benign appearance. Some smaller cysts seen in the kidneys on previous CT are not visualized on today's examination No hydronephrosis is identified. Both kidneys have normal cortical echogenicity. The bladder is unremarkable. Signed by Dr Grey Barrera on 2/13/2021 3:21 PM    Xr Chest Portable    Result Date: 3/7/2021  Exam: XR CHEST PORTABLE - 3/7/2021 12:14 PM Indication: Central line placement Comparison: 3/6/2021 Findings: CVL tip overlies the SVC. Cardiac silhouette is stable. No significant change in bilateral interstitial and airspace opacity, greatest in the RIGHT lower lobe. No visible pneumothorax. No acute osseous finding. Impression: 1. CVL tip overlies the SVC. 2.  No significant change in bilateral interstitial and airspace opacity, greatest in the RIGHT lower lung.  Signed by Dr Michelle James on 3/7/2021 1:36 PM    Xr Chest Portable    Result Date: 3/6/2021  Exam: XR CHEST PORTABLE - 3/6/2021 11:52 AM Indication: Shortness of air Comparison: 2/12/2021 Findings: Persistent and slightly increased RIGHT greater than LEFT middle and lower lung zone airspace opacity. No large pleural effusion. No pneumothorax. Cardiac silhouette is stable. No acute osseous finding. Impression: Slight increase in bilateral RIGHT greater than LEFT middle and lower lung zone airspace opacity. Signed by Dr Demetria Sanchez on 3/6/2021 1:14 PM    Xr Chest Portable    Result Date: 2/12/2021  EXAMINATION: XR CHEST PORTABLE 2/12/2021 4:13 PM HISTORY: Shortness of breath, hypoxia, recent pneumonia COMPARISON: 1/18/2021 FINDINGS: The heart appears normal in size. Atherosclerotic calcifications are seen in the aorta. Bilateral airspace opacities are present with relative sparing of the upper lobes, markedly increased compared to the prior exam. There is no appreciable pneumothorax or definite pleural effusion. Bilateral pneumonia, though worse when compared to the prior exam. Follow-up PA and lateral chest radiograph to recommended in 6-8 weeks to document resolution. Signed by Dr Osmar Acevedo on 2/12/2021 4:14 PM    Cta Pulmonary W Contrast    Result Date: 3/6/2021  Exam: CT chest angiography with 3D MIP images with IV contrast - 3/6/2021 12:33 PM Indication: Worsening respiratory function, history of pneumonia Comparison: 2/13/2021 DLP: 927 mGy cm. In order to have a CT radiation dose as low as reasonably achievable, Automated Exposure Control was utilized for adjustment of the mA and/or KV according to patient size. Findings: No evidence of pulmonary embolus. Main pulmonary artery is upper limits of normal in caliber. Thoracic aorta is nonaneurysmal. Atherosclerotic change in the aortic arch and coronary arteries noted. No pericardial effusion. Central airways are clear. Interlobular septal thickening.  Slight interval worsening of multifocal bilateral consolidation and groundglass opacity. Disease burden is greatest in the RIGHT upper lobe. Small RIGHT greater than LEFT pleural effusions with adjacent atelectasis. No pneumothorax. Mild mediastinal lymphadenopathy similar to prior and likely reactive. 1.3 cm RIGHT thyroid nodule. No acute chest wall soft tissue abnormality. No acute osseous finding. Thoracic spine is scoliotic curvature and degenerative change. No acute osseous finding. Impression: 1. No evidence of pulmonary embolus. 2.  Slight progression of bilateral RIGHT greater than LEFT consolidation, suspicious for pneumonia. 3.  Small bilateral pleural effusions and interstitial pulmonary edema. Signed by Dr Nola Chandler on 3/6/2021 1:56 PM        Assessment:  1. Recurrent pneumonia  2. Diverticular disease  3. Carotid artery stenosis  4. Peripheral arterial disease  5. Osteoarthritis  6. Hyperlipidemia  7. Hypertension  8. Iron deficiency anemia  9. Gastroesophageal reflux disease  10. Acute kidney injury  11. Epistaxis  12. Bladder cancer   13. Diabetes mellitus type 2  14. Nonsustained ventricular tachycardia  15. Echocardiogram 2/12/2021 ejection fraction 60 to 25% grade 2 diastolic dysfunction dilated left atrium  16. 5.70-second pause documented evening of 3/7/2021    Plan:  1. Recommend diagnostic cardiac catheterization tentatively planned for tomorrow advise indication alternatives benefits and risk patient agreeable. Will hydrate prior to the procedure.   2. Will need to be considered for possible dual-chamber pacemaker implantation thereafter  I have discussed with the patient regarding indications for the proposed procedure LEFT HEART CATHETERIZATION AND POSSIBLE PERCUTANEOUS INTERVENTION  along with possible alternatives benefits and risks including but not limited to risks of death, myocardial infarction, stroke, contrast induced nephropathy which in some cases may lead to acute kidney failure requiring dialysis, allergic reactions, bleeding requiring blood transfusion,  cardiac arrhthymias, respiratory failure which may require placing the patient on respiratory support such as a ventilator or breathing machine,risk of complications which may require vascular surgery, and if coronary intervention is performed emergency CABG may be required in less than 1% of cases. The patient is awake and alert and understands the issues involved and indicates willingness to proceed as ordered. The patient does not have any contraindications to dual antiplatelet therapy. The patient does not have any known  pending surgical procedures in the next 12 months at this time. The patient is  a reasonable candidate for moderate conscious sedation.     ASA score:  ASA 3 - Patient with moderate systemic disease with functional limitations    Mallampati: I (soft palate, uvula, fauces, tonsillar pillars visible)    Preferred vascular access site will be: right radial artery        Brandi Parker MD 3/9/2021 10:55 AM

## 2021-03-09 NOTE — PROGRESS NOTES
Palliative care follow up visit. Pt is resting in bed. He tells me   I feel pretty good today\". He also adds \"problem is I don't feel bad in the hospital it is when I get home\". Pt reports he is going for heart cath tomorrow. Pt tells me he has increasing SOA with exertion to the \"point of I push it hard enough, I am huffing and puffing to the point of wetting myself\". Pt says I know I can use a diaper, but I don't like that idea. We discussed \"depends\" but pt states he is more concerned with \"rash\". Discussed breams and lotions. He states he has had some prescribed but has found \"powder\" has been working pretty good. He is asking about the palliative support on dc and having HH. Explained he can talk with out pt APRN prior to visit. I explained he can ask her questions and decide for himself if this is a service he would like to have. He is agreeabe to talk with her and try Supportive Care. Pt denies pain at this time. No other needs or questions at this time. Palliative care following for support/goals of care.   Electronically signed by Carline Bay RN on 3/9/2021 at 11:24 AM

## 2021-03-09 NOTE — PROGRESS NOTES
Speech Language Pathology  Facility/Department: [facility] ICU   CLINICAL BEDSIDE SWALLOW EVALUATION  SPEECH PRODUCTION EVALUATION     NAME: Toma Duarte  : 1940  MRN: [id number]    ADMISSION DATE: 3/6/2021  ADMITTING DIAGNOSIS: has Diverticulitis of large intestine with perforation without bleeding; Generalized abdominal pain; Colonic diverticular abscess; Bilateral carotid artery stenosis; Atherosclerosis of native artery of both lower extremities with intermittent claudication ([address] Carotid stenosis, asymptomatic, left; Primary osteoarthritis of left knee; Arthritis of knee; Essential hypertension; Pure hypercholesterolemia; Slow transit constipation; Iron deficiency anemia; GERD (gastroesophageal reflux disease); Acute liver failure without hepatic coma; Acute kidney injury [facility]); CHF (congestive heart failure) ([address] Bilateral pleural effusion; Elevated troponin; Palliative care patient; Shock liver; Acute renal failure (HCC); BPH associated with nocturia; Bleeding diathesis ([address] Epistaxis; Acute blood loss anemia; Melena; Hypocalcemia; Pneumonia due to infectious organism; Bladder cancer ([address] Postoperative pain; History of bladder cancer; Severe sepsis ([address] Acute on chronic respiratory failure ([address] Acute on chronic kidney failure ([address] Hypoxemia; Metabolic acidosis; Acidosis, metabolic, with respiratory acidosis; Acute respiratory failure ([address] Type 2 diabetes mellitus with complication, without long-term current use of insulin ([address] Weakness; Other dysphagia;  Low back pain; COPD with acute lower respiratory infection ([address] CKD (chronic kidney disease) stage 3, GFR 30-59 ml/min; and Recurrent pneumonia on their problem list.    Date of Eval: 3/9/2021  Evaluating Therapist: Janie Carrizales    Current Diet level:  Regular solid consistency with thin liquids    Reason for Referral  Toma Duarte was referred for a bedside swallow evaluation to assess the efficiency of his swallow function, identify signs and symptoms of aspiration and make recommendations regarding safe dietary consistencies, effective compensatory strategies, and safe eating environment. Impression  Assessed patient's swallowing function. Patient exhibits slow, decreased oral prep of more solid consistencies as well as sluggish, moderately decreased laryngeal elevation for swallow airway protection. Even so, no outward S/S penetration/aspiration was noted with any regular solid consistency trial, nectar thick liquid trial, or thin H2O trial presented during evaluation this date. At this time, would recommend continuation regular solid consistency and thin liquids. Self-feed. Patient may benefit from videofluoroscopic swallow study to objectively determine swallow function and rule out silent aspiration. Will continue to follow. Thank you for this consult. Treatment Plan  Requires SLP Intervention: Yes     Recommended Diet and Intervention  Diet Solids Recommendation: Regular solid   Liquid Consistency Recommendation: Thin   Recommended Form of Meds: PO  Therapeutic Interventions: Patient/Family education;Diet tolerance monitoring     Compensatory Swallowing Strategies  Compensatory Swallowing Strategies: Upright as possible for all oral intake;Small bites/sips;Eat/Feed slowly; Alternate solids and liquids; Remain upright for 30-45 minutes after meals     Treatment/Goals  Timeframe for Short-term Goals: 1x/day for 3 days   Goal 1: Patient will tolerate regular solid consistency and thin liquids with min S/S penetration/aspiration during PO intake. Goal 2: Patient staff will follow swallow safety recommendations to decrease risk of penetration/aspiration during PO intake. Goal 3: Videofluoroscopic swallow study to objectively determine swallow function. Goal 4: Monitor speech production. General  Chart Reviewed: Yes  Behavior/Cognition: Alert; Cooperative  O2 Device: Nasal Cannula  Communication Observation: (Assessed patient's speech production. Patient exhibits mildly slowed, decreased lingual movements during verbalizations. SLP still ranked functional intelligibility of speech for unfamiliar listeners at 100% in utterances with background noise present.)  Follows Directions: Simple   Dentition: Adequate   Patient Positioning: Upright in bed  Consistencies Administered: Regular solid; Nectar - cup; Thin - cup      Oral Motor Examination   Labial ROM: (Adequate during labial retraction trials and labial protrusion trials.)  Labial Strength: (Adequate during labial compression trials.)  Labial Coordination: (Adequate movements were noted.)  Lingual ROM: (Adequate during lingual extension trials with full point achieved; decreased during lingual elevation trials without use of accessory jaw movement; adequate movements noted bilaterally.)  Lingual Coordination: (Mildly slowed movements were noted.)     Assessed patient's swallowing function with the following observations noted:     Oral Phase  Mastication: Regular solid (Patient exhibited slow oral prep with decreased rotary jaw movement noted during regular solid consistency trials presented independently.)  Oral Phase - Comment: Oral transit of regular solid consistency primarily measured 1-2 seconds in length and min oral cavity residue was noted post swallows; residue cleared from the mouth with additional dry swallows. Oral transit of nectar thick liquid trials and thin H2O trials, presented independently via cup, primarily measured 1-2 seconds in length. Pharyngeal Phase  Laryngeal Elevation: (Patient exhibited sluggish, moderately decreased laryngeal elevation for swallow airway protection.)  Pharyngeal Phase - Comment: No outward S/S penetration/aspiration was noted with any regular solid consistency trial, nectar thick liquid trial, or thin H2O trial presented during assessment this date.      At this time, would recommend continuation regular solid consistency and thin liquids. Self-feed. Patient may benefit from videofluoroscopic swallow study to objectively determine swallow function and rule out silent aspiration. Will continue to follow.     Electronically signed by HALINA Suggs on 3/9/2021 at 12:12 PM

## 2021-03-09 NOTE — PROGRESS NOTES
Pulmonary and Critical Care Progress note. [address] [place] [address]    MRN# [id number]    Acct# [de-identified]  3/9/2021   11:07 AM CST    Referring Provider:Jose Brannon MD      Chief Complaint: Shortness of breath. HPI: over the past 24 hours he continues to feel better. Shortness is breath is improved. Plans for heart cath.      Medications    methylPREDNISolone, 40 mg, Intravenous, Q12H    amiodarone, 200 mg, Oral, BID    miconazole, , Topical, 4x Daily    meropenem (MERREM) 1000 mg in SWFI 20 mL IV syringe, 1,000 mg, Intravenous, Q12H    tamsulosin, 0.4 mg, Oral, BID    enoxaparin, 1 mg/kg, Subcutaneous, BID    atropine, 1 mg, Intravenous, Once    [Held by provider] dilTIAZem, 30 mg, Oral, 3 times per day    vancomycin (VANCOCIN) intermittent dosing (placeholder), , Other, RX Placeholder    vancomycin, 1,000 mg, Intravenous, Q24H    doxycycline (VIBRAMYCIN) IV, 100 mg, Intravenous, Q12H    ipratropium-albuterol, 1 ampule, Inhalation, Q4H    aspirin, 81 mg, Oral, Daily    atorvastatin, 20 mg, Oral, Daily    insulin lispro, 0-6 Units, Subcutaneous, TID WC    insulin lispro, 0-3 Units, Subcutaneous, Nightly    hydrALAZINE, 25 mg, Oral, 3 times per day    metoprolol succinate, 100 mg, Oral, Daily    NIFEdipine, 60 mg, Oral, Daily    pantoprazole, 40 mg, Oral, Daily    trospium, 20 mg, Oral, Nightly    sodium chloride flush, 10 mL, Intravenous, 2 times per day     Review of Systems:  RESPIRATORY:  positive for  dyspnea  CARDIOVASCULAR:  positive for  dyspnea  GASTROINTESTINAL:  negative  GENITOURINARY:  negative    Physical Exam:  BP (!) 136/56   Pulse 74   Temp 97.5 °F (36.4 °C) (Temporal)   Resp 26   Ht 6' (1.829 m)   Wt 238 lb 11.2 oz (108.3 kg)   SpO2 92%   BMI 32.37 kg/m²     Intake/Output Summary (Last 24 hours) at 3/9/2021 1107  Last data filed at 3/9/2021 1016  Gross per 24 hour   Intake 3789 ml   Output 1130 ml   Net 2659 ml       General Appearance: alert and oriented to person, place and time, well-developed and well-nourished, in no acute distress  ENT: tympanic membrane, external ear and ear canal normal bilaterally, oropharynx clear and moist with normal mucous membranes  Pulmonary/Chest: diminished bilaterally, no rubs or tenderness or dullness to percussion. Cardiovascular: normal rate, normal S1 and S2, no gallops, intact distal pulses and no carotid bruits  Abdomen: soft, non-tender, non-distended, normal bowel sounds, no masses or organomegaly  Extremities: no cyanosis, no clubbing and 1 + edema-  bilateral   Neurologic: no focal findings.      Recent Labs     03/06/21  1231 03/07/21  0306 03/08/21  0400 03/09/21  0518   WBC 6.5 3.5* 6.0 5.9   RBC 4.06* 3.59* 3.45* 3.36*   HGB 11.2* 9.9* 9.7* 9.3*   HCT 37.8* 33.0* 31.4* 30.5*    99* 140 155   MCV 93.1 91.9 91.0 90.8   MCH 27.6 27.6 28.1 27.7   MCHC 29.6* 30.0* 30.9* 30.5*   RDW 14.8* 14.4 14.6* 14.6*      Recent Labs     03/06/21  1231 03/06/21  1243 03/07/21  0306 03/08/21  0400 03/09/21  0518     --  138 138 135*   K 4.4 4.1 5.2* 4.5 4.2     --  102 100 99   CO2 34*  --  29 31* 29   BUN 29*  --  30* 40* 46*   CREATININE 1.3*  --  1.3* 1.6* 1.7*   CALCIUM 9.2  --  8.2* 8.4* 8.1*   GLUCOSE 182*  --  168* 173* 234*      Recent Labs     03/06/21  1243   PHART 7.330*   MCZ2CQR 67.0*   PO2ART 51.0*   MVW8XYR 35.3*   G5IBUGIH 84.8*   BEART 7.4*     Recent Labs     03/06/21  1231 03/06/21  1231 03/07/21  2130 03/07/21 2130 03/08/21  0830 03/08/21  1515 03/09/21  0518   AST 16  --   --   --   --   --   --    ALT 21  --   --   --   --   --   --    ALKPHOS 137*  --   --   --   --   --   --    BILITOT 0.5  --   --   --   --   --   --    MG  --    < >  --    < >  --   --  1.9   CALCIUM 9.2   < >  --    < >  --   --  8.1*   PROBNP 1,710  --   --   --  1,793  --   --    TROPONINI 0.03   < > 0.02  --   --   --   --    LACTA 0.6  --   --   --   --   --   --    INR 1.13  --   --   --   --   --   -- DDIMER 1.59*  --   --   --  0.83*  --   --    PROCAL  --   --   --   --   --  0.07  --     < > = values in this interval not displayed. Recent Labs     03/06/21  1230   BC No Growth to date. Any change in status will be called. Radiograph: Xr Chest Portable    Result Date: 3/7/2021  Impression: 1. CVL tip overlies the SVC. 2.  No significant change in bilateral interstitial and airspace opacity, greatest in the RIGHT lower lung. Signed by Dr Cal Batista on 3/7/2021 1:36 PM    Xr Chest Portable    Result Date: 3/6/2021  Impression: Slight increase in bilateral RIGHT greater than LEFT middle and lower lung zone airspace opacity. Signed by Dr Cal Batista on 3/6/2021 1:14 PM    Cta Pulmonary W Contrast    Result Date: 3/6/2021  Impression: 1. No evidence of pulmonary embolus. 2.  Slight progression of bilateral RIGHT greater than LEFT consolidation, suspicious for pneumonia. 3.  Small bilateral pleural effusions and interstitial pulmonary edema. Signed by Dr Cal Batsita on 3/6/2021 1:56 PM       My radiograph interpretation/independent review of imaging:     Problem list generated by gBox:  Hospital Problems           Last Modified POA    Recurrent pneumonia 3/6/2021 Yes           Pulmonary Assessment/Plan:     1. Acute on chronic hypoxic hypercapnic respiratory failure, continue current management with oxygen supplementation and noninvasive ventilation while sleeping to assure adequate gas exchange. 2. No clinical evidence of infectious pneumonia. Curtail antibiotic therapy. 3. Acute on chronic congestive heart failure. His echo yesterday showed improvement in ejection fraction. Scheduled for left heart cath today. 4. Dyspnea multifactorial possibly secondary to the above. Supportive care. 5. Possible underlying obstructive sleep apnea. Consider BiPAP therapy on discharge home due to his hypercapnic respiratory failure. 6. DVT prophylaxis.           Electronically signed by Leah Tesfaye MD [place] on 03/09/21 at 11:07 AM

## 2021-03-10 LAB
ANION GAP SERPL CALCULATED.3IONS-SCNC: 4 MMOL/L (ref 7–19)
BASOPHILS ABSOLUTE: 0 K/UL (ref 0–0.2)
BASOPHILS RELATIVE PERCENT: 0 % (ref 0–1)
BUN BLDV-MCNC: 50 MG/DL (ref 8–23)
CALCIUM SERPL-MCNC: 7.8 MG/DL (ref 8.8–10.2)
CHLORIDE BLD-SCNC: 103 MMOL/L (ref 98–111)
CO2: 30 MMOL/L (ref 22–29)
CREAT SERPL-MCNC: 1.7 MG/DL (ref 0.5–1.2)
EOSINOPHILS ABSOLUTE: 0 K/UL (ref 0–0.6)
EOSINOPHILS RELATIVE PERCENT: 0 % (ref 0–5)
GFR AFRICAN AMERICAN: 47
GFR NON-AFRICAN AMERICAN: 39
GLUCOSE BLD-MCNC: 180 MG/DL (ref 74–109)
GLUCOSE BLD-MCNC: 208 MG/DL (ref 70–99)
GLUCOSE BLD-MCNC: 230 MG/DL (ref 70–99)
GLUCOSE BLD-MCNC: 292 MG/DL (ref 70–99)
HCT VFR BLD CALC: 25.4 % (ref 42–52)
HEMOGLOBIN: 7.8 G/DL (ref 14–18)
IMMATURE GRANULOCYTES #: 0.2 K/UL
LYMPHOCYTES ABSOLUTE: 0.5 K/UL (ref 1.1–4.5)
LYMPHOCYTES RELATIVE PERCENT: 6.4 % (ref 20–40)
MAGNESIUM: 1.8 MG/DL (ref 1.6–2.4)
MCH RBC QN AUTO: 28 PG (ref 27–31)
MCHC RBC AUTO-ENTMCNC: 30.7 G/DL (ref 33–37)
MCV RBC AUTO: 91 FL (ref 80–94)
MONOCYTES ABSOLUTE: 0.8 K/UL (ref 0–0.9)
MONOCYTES RELATIVE PERCENT: 11.4 % (ref 0–10)
MRSA CULTURE ONLY: NORMAL
NEUTROPHILS ABSOLUTE: 5.7 K/UL (ref 1.5–7.5)
NEUTROPHILS RELATIVE PERCENT: 79 % (ref 50–65)
PDW BLD-RTO: 14.6 % (ref 11.5–14.5)
PERFORMED ON: ABNORMAL
PLATELET # BLD: 135 K/UL (ref 130–400)
PMV BLD AUTO: 11.7 FL (ref 9.4–12.4)
POTASSIUM SERPL-SCNC: 4.4 MMOL/L (ref 3.5–5)
RBC # BLD: 2.79 M/UL (ref 4.7–6.1)
SODIUM BLD-SCNC: 137 MMOL/L (ref 136–145)
WBC # BLD: 7.2 K/UL (ref 4.8–10.8)

## 2021-03-10 PROCEDURE — 6370000000 HC RX 637 (ALT 250 FOR IP): Performed by: INTERNAL MEDICINE

## 2021-03-10 PROCEDURE — 99152 MOD SED SAME PHYS/QHP 5/>YRS: CPT | Performed by: INTERNAL MEDICINE

## 2021-03-10 PROCEDURE — 83735 ASSAY OF MAGNESIUM: CPT

## 2021-03-10 PROCEDURE — 94660 CPAP INITIATION&MGMT: CPT

## 2021-03-10 PROCEDURE — 93458 L HRT ARTERY/VENTRICLE ANGIO: CPT | Performed by: INTERNAL MEDICINE

## 2021-03-10 PROCEDURE — 6360000004 HC RX CONTRAST MEDICATION: Performed by: HOSPITALIST

## 2021-03-10 PROCEDURE — 2580000003 HC RX 258: Performed by: INTERNAL MEDICINE

## 2021-03-10 PROCEDURE — 99152 MOD SED SAME PHYS/QHP 5/>YRS: CPT

## 2021-03-10 PROCEDURE — 2700000000 HC OXYGEN THERAPY PER DAY

## 2021-03-10 PROCEDURE — B2111ZZ FLUOROSCOPY OF MULTIPLE CORONARY ARTERIES USING LOW OSMOLAR CONTRAST: ICD-10-PCS | Performed by: INTERNAL MEDICINE

## 2021-03-10 PROCEDURE — 2500000003 HC RX 250 WO HCPCS

## 2021-03-10 PROCEDURE — C1769 GUIDE WIRE: HCPCS

## 2021-03-10 PROCEDURE — 6370000000 HC RX 637 (ALT 250 FOR IP): Performed by: HOSPITALIST

## 2021-03-10 PROCEDURE — 6360000002 HC RX W HCPCS

## 2021-03-10 PROCEDURE — C1894 INTRO/SHEATH, NON-LASER: HCPCS

## 2021-03-10 PROCEDURE — 2140000000 HC CCU INTERMEDIATE R&B

## 2021-03-10 PROCEDURE — 82947 ASSAY GLUCOSE BLOOD QUANT: CPT

## 2021-03-10 PROCEDURE — 80048 BASIC METABOLIC PNL TOTAL CA: CPT

## 2021-03-10 PROCEDURE — 99233 SBSQ HOSP IP/OBS HIGH 50: CPT | Performed by: INTERNAL MEDICINE

## 2021-03-10 PROCEDURE — 85025 COMPLETE CBC W/AUTO DIFF WBC: CPT

## 2021-03-10 PROCEDURE — 4A023N7 MEASUREMENT OF CARDIAC SAMPLING AND PRESSURE, LEFT HEART, PERCUTANEOUS APPROACH: ICD-10-PCS | Performed by: INTERNAL MEDICINE

## 2021-03-10 PROCEDURE — 93458 L HRT ARTERY/VENTRICLE ANGIO: CPT

## 2021-03-10 PROCEDURE — 94640 AIRWAY INHALATION TREATMENT: CPT

## 2021-03-10 PROCEDURE — 6360000002 HC RX W HCPCS: Performed by: INTERNAL MEDICINE

## 2021-03-10 PROCEDURE — 2709999900 HC NON-CHARGEABLE SUPPLY

## 2021-03-10 RX ORDER — SODIUM CHLORIDE 9 MG/ML
INJECTION, SOLUTION INTRAVENOUS CONTINUOUS
Status: DISCONTINUED | OUTPATIENT
Start: 2021-03-10 | End: 2021-03-14 | Stop reason: SDUPTHER

## 2021-03-10 RX ORDER — DOCUSATE SODIUM 100 MG/1
300 CAPSULE, LIQUID FILLED ORAL ONCE
Status: COMPLETED | OUTPATIENT
Start: 2021-03-10 | End: 2021-03-10

## 2021-03-10 RX ORDER — ONDANSETRON 2 MG/ML
4 INJECTION INTRAMUSCULAR; INTRAVENOUS EVERY 6 HOURS PRN
Status: DISCONTINUED | OUTPATIENT
Start: 2021-03-10 | End: 2021-03-14 | Stop reason: SDUPTHER

## 2021-03-10 RX ADMIN — ENOXAPARIN SODIUM 100 MG: 100 INJECTION SUBCUTANEOUS at 19:23

## 2021-03-10 RX ADMIN — AMIODARONE HYDROCHLORIDE 200 MG: 200 TABLET ORAL at 22:28

## 2021-03-10 RX ADMIN — SODIUM CHLORIDE: 9 INJECTION, SOLUTION INTRAVENOUS at 19:34

## 2021-03-10 RX ADMIN — MICONAZOLE NITRATE: 2 POWDER TOPICAL at 23:43

## 2021-03-10 RX ADMIN — IPRATROPIUM BROMIDE AND ALBUTEROL SULFATE 1 AMPULE: 2.5; .5 SOLUTION RESPIRATORY (INHALATION) at 02:25

## 2021-03-10 RX ADMIN — SODIUM CHLORIDE: 9 INJECTION, SOLUTION INTRAVENOUS at 06:29

## 2021-03-10 RX ADMIN — DOCUSATE SODIUM 300 MG: 100 CAPSULE, LIQUID FILLED ORAL at 22:28

## 2021-03-10 RX ADMIN — INSULIN LISPRO 1 UNITS: 100 INJECTION, SOLUTION INTRAVENOUS; SUBCUTANEOUS at 00:27

## 2021-03-10 RX ADMIN — NIFEDIPINE 60 MG: 60 TABLET, FILM COATED, EXTENDED RELEASE ORAL at 08:47

## 2021-03-10 RX ADMIN — IPRATROPIUM BROMIDE AND ALBUTEROL SULFATE 1 AMPULE: 2.5; .5 SOLUTION RESPIRATORY (INHALATION) at 07:00

## 2021-03-10 RX ADMIN — ASPIRIN 81 MG: 81 TABLET, FILM COATED ORAL at 08:47

## 2021-03-10 RX ADMIN — SODIUM CHLORIDE, PRESERVATIVE FREE 10 ML: 5 INJECTION INTRAVENOUS at 09:35

## 2021-03-10 RX ADMIN — TROSPIUM CHLORIDE 20 MG: 20 TABLET, FILM COATED ORAL at 22:28

## 2021-03-10 RX ADMIN — HYDRALAZINE HYDROCHLORIDE 25 MG: 25 TABLET, FILM COATED ORAL at 06:23

## 2021-03-10 RX ADMIN — IPRATROPIUM BROMIDE AND ALBUTEROL SULFATE 1 AMPULE: 2.5; .5 SOLUTION RESPIRATORY (INHALATION) at 14:40

## 2021-03-10 RX ADMIN — MICONAZOLE NITRATE: 2 POWDER TOPICAL at 19:24

## 2021-03-10 RX ADMIN — IPRATROPIUM BROMIDE AND ALBUTEROL SULFATE 1 AMPULE: 2.5; .5 SOLUTION RESPIRATORY (INHALATION) at 18:26

## 2021-03-10 RX ADMIN — MICONAZOLE NITRATE: 2 POWDER TOPICAL at 08:48

## 2021-03-10 RX ADMIN — PREDNISONE 20 MG: 20 TABLET ORAL at 13:57

## 2021-03-10 RX ADMIN — CEFUROXIME AXETIL 500 MG: 250 TABLET ORAL at 23:42

## 2021-03-10 RX ADMIN — AMIODARONE HYDROCHLORIDE 200 MG: 200 TABLET ORAL at 08:47

## 2021-03-10 RX ADMIN — IPRATROPIUM BROMIDE AND ALBUTEROL SULFATE 1 AMPULE: 2.5; .5 SOLUTION RESPIRATORY (INHALATION) at 10:25

## 2021-03-10 RX ADMIN — PANTOPRAZOLE SODIUM 40 MG: 40 TABLET, DELAYED RELEASE ORAL at 08:47

## 2021-03-10 RX ADMIN — INSULIN LISPRO 2 UNITS: 100 INJECTION, SOLUTION INTRAVENOUS; SUBCUTANEOUS at 08:46

## 2021-03-10 RX ADMIN — ATORVASTATIN CALCIUM 20 MG: 20 TABLET, FILM COATED ORAL at 08:47

## 2021-03-10 RX ADMIN — CEFUROXIME AXETIL 500 MG: 250 TABLET ORAL at 08:47

## 2021-03-10 RX ADMIN — IOPAMIDOL 110 ML: 612 INJECTION, SOLUTION INTRAVENOUS at 16:52

## 2021-03-10 RX ADMIN — INSULIN LISPRO 2 UNITS: 100 INJECTION, SOLUTION INTRAVENOUS; SUBCUTANEOUS at 22:31

## 2021-03-10 RX ADMIN — SODIUM CHLORIDE: 9 INJECTION, SOLUTION INTRAVENOUS at 08:46

## 2021-03-10 RX ADMIN — METOPROLOL SUCCINATE 100 MG: 50 TABLET, EXTENDED RELEASE ORAL at 08:47

## 2021-03-10 RX ADMIN — TAMSULOSIN HYDROCHLORIDE 0.4 MG: 0.4 CAPSULE ORAL at 23:43

## 2021-03-10 RX ADMIN — TAMSULOSIN HYDROCHLORIDE 0.4 MG: 0.4 CAPSULE ORAL at 09:30

## 2021-03-10 RX ADMIN — HYDRALAZINE HYDROCHLORIDE 25 MG: 25 TABLET, FILM COATED ORAL at 22:28

## 2021-03-10 RX ADMIN — IPRATROPIUM BROMIDE AND ALBUTEROL SULFATE 1 AMPULE: 2.5; .5 SOLUTION RESPIRATORY (INHALATION) at 21:48

## 2021-03-10 RX ADMIN — HYDRALAZINE HYDROCHLORIDE 25 MG: 25 TABLET, FILM COATED ORAL at 00:26

## 2021-03-10 NOTE — PROGRESS NOTES
This patient is current with Palliative Care. RN follow up visit today with patient. Patient states that he is feeling fair today. States that he has been hospitalized 3 times in the last 6 weeks. States he thought is was for recurrent pneumonia, and now they think something might be wrong with his heart. Patient states he is supposed to have a heart cath this afternoon. Patient states he would appreciate  Services coming to pray with him today (Angela Ibrahim notified). Patient is also concerned about a bill that his wife received in the mail today. States the bill was for services on 2/15/2021 and was for a significant amount. Patient anxious about this bill and would like to speak with someone in billing. SN notified Dorothea at ext [phone] in billing. Patient denies any shortness of breath, denies any chest pain, denies any recent falls. Patient does report occasional back pain, but states that he is not feeling any back pain at present. Patient reports that he has oxygen at home, walk in shower with seat at home, and walker. Patient denies needing any further DME at this time. Patient states that he is hoping to work with therapy to regain strength once physician's know the results of heart cath. Patient would like to get stronger, then return home with his wife. Patient does state that he is current with [facility] as well. Active listening and support provided to patient. Palliative care to continue to follow and support this patient.   Electronically signed by Karla George RN on 3/10/2021 at 2:50 PM

## 2021-03-10 NOTE — PROGRESS NOTES
Visited with pt per pt's request after a visit from Irineo Murcia serving in palliative care. Pt voiced concerns over several pneumonia diagnoses and says they are now saying a heart condition. Pt voiced some other concerns to Luke Kapadia; see her note. Provided spiritual care with sustaining presence, nurtured hope, and prayer. Pt expressed gratitude for spiritual care.     Electronically signed by Christopher Correia on 3/10/2021 at 2:48 PM

## 2021-03-10 NOTE — PROGRESS NOTES
Subjective:   Critical Care Daily Progress Note: 3/10/2021 7:49 PM    Interval History:   Patient now s/p Cardiac Cath has extensive RCA disease with absent disease in left  Has been recommended for Pacer by cardio, they will meet with him in the AM   Reports urinary urgency & frequency  Constipation since arrival at [facility]    Scheduled Meds:   docusate sodium  300 mg Oral Once    amiodarone  200 mg Oral BID    cefUROXime  500 mg Oral BID    predniSONE  20 mg Oral Daily    Followed by   Cheri Emmanuel ON 3/12/2021] predniSONE  15 mg Oral Daily    Followed by   Cheri Emmanuel ON 3/15/2021] predniSONE  10 mg Oral Daily    Followed by   Cheri Emmanuel ON 3/18/2021] predniSONE  5 mg Oral Daily    miconazole   Topical 4x Daily    tamsulosin  0.4 mg Oral BID    enoxaparin  1 mg/kg Subcutaneous BID    atropine  1 mg Intravenous Once    [Held by provider] dilTIAZem  30 mg Oral 3 times per day    ipratropium-albuterol  1 ampule Inhalation Q4H    aspirin  81 mg Oral Daily    atorvastatin  20 mg Oral Daily    insulin lispro  0-6 Units Subcutaneous TID WC    insulin lispro  0-3 Units Subcutaneous Nightly    hydrALAZINE  25 mg Oral 3 times per day    metoprolol succinate  100 mg Oral Daily    NIFEdipine  60 mg Oral Daily    pantoprazole  40 mg Oral Daily    trospium  20 mg Oral Nightly    sodium chloride flush  10 mL Intravenous 2 times per day     Continuous Infusions:   sodium chloride 75 mL/hr at 03/10/21 1934    amiodarone 0.5 mg/min (03/09/21 0138)    dextrose       PRN Meds:ondansetron, labetalol, calcium carbonate, guaiFENesin, sodium chloride flush, polyethylene glycol, acetaminophen **OR** acetaminophen, glucose, dextrose, glucagon (rDNA), dextrose        Objective:     Vitals reviewed. I/O last 3 completed shifts: In: 7433 [P.O.:360; I.V.:1150]  Out: 850 [Urine:850]  No intake/output data recorded.     /64   Pulse 67   Temp 97.1 °F (36.2 °C) (Temporal)   Resp 18   Ht 6' (1.829 m)   Wt 230 lb (104.3 kg)   SpO2 94%   BMI 31.19 kg/m²     Physical Exam  Vitals signs reviewed. Exam conducted with a chaperone present. Constitutional:       General: He is not in acute distress. Appearance: Normal appearance. He is obese. He is not ill-appearing or toxic-appearing. HENT:      Head: Normocephalic and atraumatic. Nose: No congestion or rhinorrhea. Eyes:      General:         Right eye: No discharge. Left eye: No discharge. Neck:      Musculoskeletal: Neck supple. Comments: Trachea appears midline  Cardiovascular:      Rate and Rhythm: Normal rate and regular rhythm. Heart sounds: No murmur. No friction rub. No gallop. Pulmonary:      Effort: Pulmonary effort is normal. No respiratory distress. Breath sounds: No stridor. No wheezing, rhonchi or rales. Chest:      Chest wall: No tenderness. Abdominal:      General: Bowel sounds are normal.      Tenderness: There is no abdominal tenderness. There is no guarding or rebound. Musculoskeletal:      Comments: Has increased fat stores, no wasting of muscle stores   Skin:     General: Skin is warm. Comments: nondiaphoretic   Neurological:      Mental Status: He is alert. Cranial Nerves: No dysarthria. Motor: No tremor or seizure activity. Psychiatric:         Mood and Affect: Mood normal.         Behavior: Behavior normal.       Results for Agustín Chavez (MRN [id number]) as of 3/10/2021 19:56   Ref.  Range 3/10/2021 05:28   Sodium Latest Ref Range: 136 - 145 mmol/L 137   Potassium Latest Ref Range: 3.5 - 5.0 mmol/L 4.4   Chloride Latest Ref Range: 98 - 111 mmol/L 103   CO2 Latest Ref Range: 22 - 29 mmol/L 30 (H)   BUN Latest Ref Range: 8 - 23 mg/dL 50 (H)   Creatinine Latest Ref Range: 0.5 - 1.2 mg/dL 1.7 (H)   Anion Gap Latest Ref Range: 7 - 19 mmol/L 4 (L)   GFR Non- Latest Ref Range: >60  39 (A)   GFR  Latest Ref Range: >59  47 (L)   Magnesium Latest Ref Range: 1.6 - 2.4 mg/dL 1.8   Glucose Latest Ref Range: 74 - 109 mg/dL 180 (H)   Calcium Latest Ref Range: 8.8 - 10.2 mg/dL 7.8 (L)   WBC Latest Ref Range: 4.8 - 10.8 K/uL 7.2   RBC Latest Ref Range: 4.70 - 6.10 M/uL 2.79 (L)   Hemoglobin Quant Latest Ref Range: 14.0 - 18.0 g/dL 7.8 (L)   Hematocrit Latest Ref Range: 42.0 - 52.0 % 25.4 (L)   MCV Latest Ref Range: 80.0 - 94.0 fL 91.0   MCH Latest Ref Range: 27.0 - 31.0 pg 28.0   MCHC Latest Ref Range: 33.0 - 37.0 g/dL 30.7 (L)   MPV Latest Ref Range: 9.4 - 12.4 fL 11.7   RDW Latest Ref Range: 11.5 - 14.5 % 14.6 (H)   Platelet Count Latest Ref Range: 130 - 400 K/uL 135   Neutrophils % Latest Ref Range: 50.0 - 65.0 % 79.0 (H)   Lymphocyte % Latest Ref Range: 20.0 - 40.0 % 6.4 (L)   Monocytes % Latest Ref Range: 0.0 - 10.0 % 11.4 (H)   Eosinophils % Latest Ref Range: 0.0 - 5.0 % 0.0   Basophils % Latest Ref Range: 0.0 - 1.0 % 0.0   Neutrophils Absolute Latest Ref Range: 1.5 - 7.5 K/uL 5.7   Immature Granulocytes # Latest Units: K/uL 0.2   Lymphocytes Absolute Latest Ref Range: 1.1 - 4.5 K/uL 0.5 (L)   Monocytes Absolute Latest Ref Range: 0.00 - 0.90 K/uL 0.80   Eosinophils Absolute Latest Ref Range: 0.00 - 0.60 K/uL 0.00   Basophils Absolute Latest Ref Range: 0.00 - 0.20 K/uL 0.00         Assessment:     Active Problems:    Recurrent pneumonia  Resolved Problems:    * No resolved hospital problems.  *        Plan:     Recurrent versus nonresolving CAP with high risk for MDR pathogens  Multiple prior admissions for the same, required intubation 1/2021  CTA chest 3/6/2021: Bilateral pneumonia, no PE  Broad-spectrum antibiotics  Follow cultures  Lactate 0.6  Molecular respiratory panel negative  TTE 3/8/2021: EF 60-65%, LV and RV appear normal  Pulmonary following  Patient will require routine outpatient pulmonary/cardiology follow-up     COPD exacerbation  Steroids, tapering course of PO ordered  Nebs  Antibiotics  Goal sats 88-92%     Acute hypoxemic and hypercapnic respiratory failure Secondary to above processes  BiPAP since weaned off  Currently on 2 L via NC, decreasing requirement  Follow ABG/CXR  Oximetry studies as warranted     NSVT  Multiple episodes 3/7/2021  Asymptomatic  Hemodynamically stable  Alert and oriented x3  Amiodarone gtt  Metoprolol/nifedipine already on board, caution sinus pauses  Shock as warranted  Follow lytes  Serial troponin 0.03 > 0.02 > 0.02  Follow EKG  Continue telemetry monitoring  TTE 3/8/2021: EF 60-65%, LV and RV appear normal  Cardiology following, recs event recorder upon discharge  Patient will require routine outpatient cardiology/pulmonary follow-up  -  Possibly for pacer on Friday AM     Chronically elevated troponin:  Denies chest pain  Current level lower than multiple prior levels     Hx tobacco dependence:  Counseled on continued cessation- done already     CKD 3:  Follow renal function/urine output  Avoid offending agents  Previously required RRT for 3.5 months     DM2:   Lispro ISS  Hypoglycemic order set       DVT prophylaxis: Lovenox       Critical Care Time greater than: 45 Minutes.

## 2021-03-10 NOTE — PROGRESS NOTES
Pt was bathing self for cath lab later and had an area on his RFA, size of pencil eraser, appeared small bruised. Pt scraped part of this off with wash cloth and now bleeding, steady stream.  PCA had place Mepilex x 3 and still bleeding. I have now cleaned area, held some pressure for 5 min and dressed with cottonball, dry gauze and tegaderm with Pressure placed with tape. Also have changed Central line dressing with had become saturated with blood. Will monitor both sites.  Electronically signed by Melva Bennett RN on 3/10/2021 at 3:33 PM

## 2021-03-10 NOTE — PROGRESS NOTES
No further bleeding noted at RFA or Central line at this time. Both dressings dry.  Electronically signed by Glynn Flowers RN on 3/10/2021 at 3:34 PM

## 2021-03-10 NOTE — PROGRESS NOTES
Pulmonary and Critical Care Progress note. [address] [place] [address]    MRN# [id number]    Acct# [de-identified]  3/10/2021   1:30 PM CST    Referring Sb Peng MD      Chief Complaint: Shortness of breath. HPI: over the past 24 hours he continues to feel better. Shortness is breath is improved. Plans for heart cath.      Medications  amiodarone, 200 mg, Oral, BID    cefUROXime, 500 mg, Oral, BID    predniSONE, 20 mg, Oral, Daily **FOLLOWED BY** [START ON 3/12/2021] predniSONE, 15 mg, Oral, Daily **FOLLOWED BY** [START ON 3/15/2021] predniSONE, 10 mg, Oral, Daily **FOLLOWED BY** [START ON 3/18/2021] predniSONE, 5 mg, Oral, Daily    miconazole, , Topical, 4x Daily    tamsulosin, 0.4 mg, Oral, BID    enoxaparin, 1 mg/kg, Subcutaneous, BID    atropine, 1 mg, Intravenous, Once    [Held by provider] dilTIAZem, 30 mg, Oral, 3 times per day    ipratropium-albuterol, 1 ampule, Inhalation, Q4H    aspirin, 81 mg, Oral, Daily    atorvastatin, 20 mg, Oral, Daily    insulin lispro, 0-6 Units, Subcutaneous, TID WC    insulin lispro, 0-3 Units, Subcutaneous, Nightly    hydrALAZINE, 25 mg, Oral, 3 times per day    metoprolol succinate, 100 mg, Oral, Daily    NIFEdipine, 60 mg, Oral, Daily    pantoprazole, 40 mg, Oral, Daily    trospium, 20 mg, Oral, Nightly    sodium chloride flush, 10 mL, Intravenous, 2 times per day     Review of Systems:  RESPIRATORY:  positive for  dyspnea  CARDIOVASCULAR:  positive for  dyspnea  GASTROINTESTINAL:  negative  GENITOURINARY:  negative    Physical Exam:  /63   Pulse 66   Temp 97.4 °F (36.3 °C) (Temporal)   Resp 17   Ht 6' (1.829 m)   Wt 230 lb (104.3 kg)   SpO2 93%   BMI 31.19 kg/m²     Intake/Output Summary (Last 24 hours) at 3/10/2021 1611  Last data filed at 3/10/2021 1344  Gross per 24 hour   Intake 1360 ml   Output 725 ml   Net 635 ml       General Appearance: alert and oriented to person, place and time, well-developed and well-nourished, in no acute distress  ENT: tympanic membrane, external ear and ear canal normal bilaterally, oropharynx clear and moist with normal mucous membranes  Pulmonary/Chest: diminished bilaterally, no rubs or tenderness or dullness to percussion. Cardiovascular: normal rate, normal S1 and S2, no gallops, intact distal pulses and no carotid bruits  Abdomen: soft, non-tender, non-distended, normal bowel sounds, no masses or organomegaly  Extremities: no cyanosis, no clubbing and 1 + edema-  bilateral   Neurologic: no focal findings. Recent Labs     03/08/21 0400 03/09/21  0518 03/10/21  0528   WBC 6.0 5.9 7.2   RBC 3.45* 3.36* 2.79*   HGB 9.7* 9.3* 7.8*   HCT 31.4* 30.5* 25.4*    155 135   MCV 91.0 90.8 91.0   MCH 28.1 27.7 28.0   MCHC 30.9* 30.5* 30.7*   RDW 14.6* 14.6* 14.6*      Recent Labs     03/08/21 0400 03/09/21  0518 03/10/21  0528    135* 137   K 4.5 4.2 4.4    99 103   CO2 31* 29 30*   BUN 40* 46* 50*   CREATININE 1.6* 1.7* 1.7*   CALCIUM 8.4* 8.1* 7.8*   GLUCOSE 173* 234* 180*      No results for input(s): PHART, YZD0XYA, PO2ART, NSS8AAC, Y5ZYFRXM, BEART in the last 72 hours. Recent Labs     03/07/21 2130 03/07/21  2130 03/08/21  0830 03/08/21  1515 03/08/21  1515 03/10/21  0528   MG  --    < >  --   --    < > 1.8   CALCIUM  --    < >  --   --    < > 7.8*   PROBNP  --   --  1,793  --   --   --    TROPONINI 0.02  --   --   --   --   --    DDIMER  --   --  0.83*  --   --   --    PROCAL  --   --   --  0.07  --   --     < > = values in this interval not displayed. No results for input(s): BC, LABGRAM, CULTRESP, BFCX in the last 72 hours. Radiograph: Xr Chest Portable    Result Date: 3/7/2021  Impression: 1. CVL tip overlies the SVC. 2.  No significant change in bilateral interstitial and airspace opacity, greatest in the RIGHT lower lung.  Signed by Dr Monica Maharaj on 3/7/2021 1:36 PM    Xr Chest Portable    Result Date: 3/6/2021  Impression: Slight increase in bilateral RIGHT greater than LEFT middle and lower lung zone airspace opacity. Signed by Dr James Larry on 3/6/2021 1:14 PM    Cta Pulmonary W Contrast    Result Date: 3/6/2021  Impression: 1. No evidence of pulmonary embolus. 2.  Slight progression of bilateral RIGHT greater than LEFT consolidation, suspicious for pneumonia. 3.  Small bilateral pleural effusions and interstitial pulmonary edema. Signed by Dr James Larry on 3/6/2021 1:56 PM       My radiograph interpretation/independent review of imaging:     Problem list generated by "[employer]:  Hospital Problems           Last Modified POA    Recurrent pneumonia 3/6/2021 Yes           Pulmonary Assessment/Plan:     1. Acute on chronic hypoxic hypercapnic respiratory failure, continue current management with oxygen supplementation and noninvasive ventilation while sleeping to assure adequate gas exchange. 2. No clinical evidence of infectious pneumonia. On oral antibiotics. 3. Anemia ? Due to blood draws. No evidence of active bleeding. Work up per hospitalist.   4. Acute on chronic congestive heart failure. Scheduled for left heart cath today. 5. Dyspnea multifactorial possibly secondary to the above. Supportive care. Improved. 6. Possible underlying obstructive sleep apnea. Consider BiPAP therapy on discharge home due to his hypercapnic respiratory failure. 7. DVT prophylaxis.           Electronically signed by Halley Lombardo MD on 03/10/21 at 4:11 PM

## 2021-03-10 NOTE — PROGRESS NOTES
Pt now to cath lab on bed with transport and his family. Pt remains on telemetry. No complaints of pain. Pre cath IVF NS at 75ml/hr. Shultz cath in place with clear yellow urine.  Electronically signed by Byron Acuna RN on 3/10/2021 at 4:13 PM

## 2021-03-11 ENCOUNTER — APPOINTMENT (OUTPATIENT)
Dept: GENERAL RADIOLOGY | Age: 81
DRG: 981 | End: 2021-03-11
Payer: MEDICARE

## 2021-03-11 LAB
ALBUMIN SERPL-MCNC: 2.8 G/DL (ref 3.5–5.2)
ALP BLD-CCNC: 68 U/L (ref 40–130)
ALT SERPL-CCNC: 26 U/L (ref 5–41)
ANION GAP SERPL CALCULATED.3IONS-SCNC: 9 MMOL/L (ref 7–19)
AST SERPL-CCNC: 18 U/L (ref 5–40)
BACTERIA: NEGATIVE /HPF
BASOPHILS ABSOLUTE: 0 K/UL (ref 0–0.2)
BASOPHILS RELATIVE PERCENT: 0.1 % (ref 0–1)
BILIRUB SERPL-MCNC: 0.3 MG/DL (ref 0.2–1.2)
BILIRUBIN URINE: NEGATIVE
BLOOD CULTURE, ROUTINE: NORMAL
BLOOD, URINE: ABNORMAL
BUN BLDV-MCNC: 54 MG/DL (ref 8–23)
CALCIUM SERPL-MCNC: 7.6 MG/DL (ref 8.8–10.2)
CHLORIDE BLD-SCNC: 103 MMOL/L (ref 98–111)
CLARITY: CLEAR
CO2: 26 MMOL/L (ref 22–29)
COLOR: YELLOW
CREAT SERPL-MCNC: 1.6 MG/DL (ref 0.5–1.2)
CRYSTALS, UA: ABNORMAL /HPF
CULTURE, BLOOD 2: NORMAL
EKG P AXIS: 24 DEGREES
EKG P-R INTERVAL: 174 MS
EKG Q-T INTERVAL: 394 MS
EKG QRS DURATION: 108 MS
EKG QTC CALCULATION (BAZETT): 406 MS
EKG T AXIS: 48 DEGREES
EOSINOPHILS ABSOLUTE: 0 K/UL (ref 0–0.6)
EOSINOPHILS RELATIVE PERCENT: 0 % (ref 0–5)
EPITHELIAL CELLS, UA: 2 /HPF (ref 0–5)
GFR AFRICAN AMERICAN: 51
GFR NON-AFRICAN AMERICAN: 42
GLUCOSE BLD-MCNC: 171 MG/DL (ref 70–99)
GLUCOSE BLD-MCNC: 196 MG/DL (ref 70–99)
GLUCOSE BLD-MCNC: 210 MG/DL (ref 70–99)
GLUCOSE BLD-MCNC: 253 MG/DL (ref 70–99)
GLUCOSE BLD-MCNC: 254 MG/DL (ref 74–109)
GLUCOSE URINE: NEGATIVE MG/DL
HCT VFR BLD CALC: 22.7 % (ref 42–52)
HEMOGLOBIN: 7.1 G/DL (ref 14–18)
HYALINE CASTS: 3 /HPF (ref 0–8)
IMMATURE GRANULOCYTES #: 0.4 K/UL
KETONES, URINE: NEGATIVE MG/DL
LEUKOCYTE ESTERASE, URINE: NEGATIVE
LYMPHOCYTES ABSOLUTE: 0.6 K/UL (ref 1.1–4.5)
LYMPHOCYTES RELATIVE PERCENT: 7.9 % (ref 20–40)
MCH RBC QN AUTO: 28.4 PG (ref 27–31)
MCHC RBC AUTO-ENTMCNC: 31.3 G/DL (ref 33–37)
MCV RBC AUTO: 90.8 FL (ref 80–94)
MONOCYTES ABSOLUTE: 0.8 K/UL (ref 0–0.9)
MONOCYTES RELATIVE PERCENT: 10.5 % (ref 0–10)
NEUTROPHILS ABSOLUTE: 5.6 K/UL (ref 1.5–7.5)
NEUTROPHILS RELATIVE PERCENT: 75.9 % (ref 50–65)
NITRITE, URINE: NEGATIVE
PDW BLD-RTO: 14.7 % (ref 11.5–14.5)
PERFORMED ON: ABNORMAL
PH UA: 5.5 (ref 5–8)
PLATELET # BLD: 146 K/UL (ref 130–400)
PMV BLD AUTO: 12.6 FL (ref 9.4–12.4)
POTASSIUM REFLEX MAGNESIUM: 4.9 MMOL/L (ref 3.5–5)
PROTEIN UA: 100 MG/DL
RBC # BLD: 2.5 M/UL (ref 4.7–6.1)
RBC UA: >900 /HPF (ref 0–4)
SODIUM BLD-SCNC: 138 MMOL/L (ref 136–145)
SPECIFIC GRAVITY UA: 1.04 (ref 1–1.03)
TOTAL PROTEIN: 4.6 G/DL (ref 6.6–8.7)
UROBILINOGEN, URINE: 1 E.U./DL
WBC # BLD: 7.7 K/UL (ref 4.8–10.8)
WBC UA: 6 /HPF (ref 0–5)

## 2021-03-11 PROCEDURE — 6370000000 HC RX 637 (ALT 250 FOR IP): Performed by: INTERNAL MEDICINE

## 2021-03-11 PROCEDURE — 80053 COMPREHEN METABOLIC PANEL: CPT

## 2021-03-11 PROCEDURE — 36592 COLLECT BLOOD FROM PICC: CPT

## 2021-03-11 PROCEDURE — 71045 X-RAY EXAM CHEST 1 VIEW: CPT

## 2021-03-11 PROCEDURE — 81001 URINALYSIS AUTO W/SCOPE: CPT

## 2021-03-11 PROCEDURE — 6370000000 HC RX 637 (ALT 250 FOR IP): Performed by: HOSPITALIST

## 2021-03-11 PROCEDURE — 99233 SBSQ HOSP IP/OBS HIGH 50: CPT | Performed by: INTERNAL MEDICINE

## 2021-03-11 PROCEDURE — 99232 SBSQ HOSP IP/OBS MODERATE 35: CPT | Performed by: INTERNAL MEDICINE

## 2021-03-11 PROCEDURE — 94640 AIRWAY INHALATION TREATMENT: CPT

## 2021-03-11 PROCEDURE — 94660 CPAP INITIATION&MGMT: CPT

## 2021-03-11 PROCEDURE — 2700000000 HC OXYGEN THERAPY PER DAY

## 2021-03-11 PROCEDURE — 2140000000 HC CCU INTERMEDIATE R&B

## 2021-03-11 PROCEDURE — 93005 ELECTROCARDIOGRAM TRACING: CPT | Performed by: INTERNAL MEDICINE

## 2021-03-11 PROCEDURE — 2580000003 HC RX 258: Performed by: INTERNAL MEDICINE

## 2021-03-11 PROCEDURE — 82947 ASSAY GLUCOSE BLOOD QUANT: CPT

## 2021-03-11 PROCEDURE — 85025 COMPLETE CBC W/AUTO DIFF WBC: CPT

## 2021-03-11 RX ORDER — CHLORHEXIDINE GLUCONATE 4 G/100ML
SOLUTION TOPICAL ONCE
Status: DISCONTINUED | OUTPATIENT
Start: 2021-03-11 | End: 2021-03-15

## 2021-03-11 RX ORDER — SODIUM CHLORIDE 9 MG/ML
INJECTION, SOLUTION INTRAVENOUS CONTINUOUS
Status: DISCONTINUED | OUTPATIENT
Start: 2021-03-11 | End: 2021-03-14 | Stop reason: SDUPTHER

## 2021-03-11 RX ORDER — SODIUM CHLORIDE 0.9 % (FLUSH) 0.9 %
10 SYRINGE (ML) INJECTION EVERY 12 HOURS SCHEDULED
Status: CANCELLED | OUTPATIENT
Start: 2021-03-11

## 2021-03-11 RX ORDER — SODIUM CHLORIDE 0.9 % (FLUSH) 0.9 %
10 SYRINGE (ML) INJECTION PRN
Status: CANCELLED | OUTPATIENT
Start: 2021-03-11

## 2021-03-11 RX ADMIN — AMIODARONE HYDROCHLORIDE 200 MG: 200 TABLET ORAL at 21:20

## 2021-03-11 RX ADMIN — INSULIN LISPRO 1 UNITS: 100 INJECTION, SOLUTION INTRAVENOUS; SUBCUTANEOUS at 21:28

## 2021-03-11 RX ADMIN — IPRATROPIUM BROMIDE AND ALBUTEROL SULFATE 1 AMPULE: 2.5; .5 SOLUTION RESPIRATORY (INHALATION) at 10:58

## 2021-03-11 RX ADMIN — INSULIN LISPRO 2 UNITS: 100 INJECTION, SOLUTION INTRAVENOUS; SUBCUTANEOUS at 13:25

## 2021-03-11 RX ADMIN — ANTACID TABLETS 500 MG: 500 TABLET, CHEWABLE ORAL at 21:20

## 2021-03-11 RX ADMIN — IPRATROPIUM BROMIDE AND ALBUTEROL SULFATE 1 AMPULE: 2.5; .5 SOLUTION RESPIRATORY (INHALATION) at 19:30

## 2021-03-11 RX ADMIN — POLYETHYLENE GLYCOL 3350 17 G: 17 POWDER, FOR SOLUTION ORAL at 10:38

## 2021-03-11 RX ADMIN — INSULIN LISPRO 3 UNITS: 100 INJECTION, SOLUTION INTRAVENOUS; SUBCUTANEOUS at 10:39

## 2021-03-11 RX ADMIN — IPRATROPIUM BROMIDE AND ALBUTEROL SULFATE 1 AMPULE: 2.5; .5 SOLUTION RESPIRATORY (INHALATION) at 14:42

## 2021-03-11 RX ADMIN — INSULIN LISPRO 1 UNITS: 100 INJECTION, SOLUTION INTRAVENOUS; SUBCUTANEOUS at 18:38

## 2021-03-11 RX ADMIN — MICONAZOLE NITRATE: 2 POWDER TOPICAL at 13:27

## 2021-03-11 RX ADMIN — IPRATROPIUM BROMIDE AND ALBUTEROL SULFATE 1 AMPULE: 2.5; .5 SOLUTION RESPIRATORY (INHALATION) at 22:42

## 2021-03-11 RX ADMIN — MICONAZOLE NITRATE: 2 POWDER TOPICAL at 18:41

## 2021-03-11 RX ADMIN — AMIODARONE HYDROCHLORIDE 200 MG: 200 TABLET ORAL at 10:43

## 2021-03-11 RX ADMIN — TROSPIUM CHLORIDE 20 MG: 20 TABLET, FILM COATED ORAL at 21:20

## 2021-03-11 RX ADMIN — SODIUM CHLORIDE, PRESERVATIVE FREE 10 ML: 5 INJECTION INTRAVENOUS at 13:25

## 2021-03-11 RX ADMIN — CEFUROXIME AXETIL 500 MG: 250 TABLET ORAL at 21:20

## 2021-03-11 RX ADMIN — MICONAZOLE NITRATE: 2 POWDER TOPICAL at 10:41

## 2021-03-11 RX ADMIN — METOPROLOL SUCCINATE 100 MG: 50 TABLET, EXTENDED RELEASE ORAL at 10:43

## 2021-03-11 RX ADMIN — TAMSULOSIN HYDROCHLORIDE 0.4 MG: 0.4 CAPSULE ORAL at 21:20

## 2021-03-11 RX ADMIN — CEFUROXIME AXETIL 500 MG: 250 TABLET ORAL at 10:42

## 2021-03-11 RX ADMIN — PANTOPRAZOLE SODIUM 40 MG: 40 TABLET, DELAYED RELEASE ORAL at 10:42

## 2021-03-11 RX ADMIN — NIFEDIPINE 60 MG: 60 TABLET, FILM COATED, EXTENDED RELEASE ORAL at 10:42

## 2021-03-11 RX ADMIN — IPRATROPIUM BROMIDE AND ALBUTEROL SULFATE 1 AMPULE: 2.5; .5 SOLUTION RESPIRATORY (INHALATION) at 06:36

## 2021-03-11 RX ADMIN — TAMSULOSIN HYDROCHLORIDE 0.4 MG: 0.4 CAPSULE ORAL at 10:42

## 2021-03-11 RX ADMIN — PREDNISONE 20 MG: 20 TABLET ORAL at 10:43

## 2021-03-11 RX ADMIN — HYDRALAZINE HYDROCHLORIDE 25 MG: 25 TABLET, FILM COATED ORAL at 14:57

## 2021-03-11 RX ADMIN — MUPIROCIN: 20 OINTMENT TOPICAL at 22:58

## 2021-03-11 RX ADMIN — ATORVASTATIN CALCIUM 20 MG: 20 TABLET, FILM COATED ORAL at 10:42

## 2021-03-11 RX ADMIN — MICONAZOLE NITRATE: 2 POWDER TOPICAL at 21:21

## 2021-03-11 RX ADMIN — ASPIRIN 81 MG: 81 TABLET, FILM COATED ORAL at 10:42

## 2021-03-11 NOTE — PROGRESS NOTES
Cardiology Daily Note Angela Clancy MD      Patient:  Kat Maier  [id number]    Patient Active Problem List    Diagnosis Date Noted    Recurrent pneumonia 03/06/2021     Priority: Low    COPD with acute lower respiratory infection ([address] 02/24/2021     Priority: Low    CKD (chronic kidney disease) stage 3, GFR 30-59 ml/min 02/24/2021     Priority: Low    Low back pain 01/22/2021     Priority: Low    Acute respiratory failure ([address] 01/21/2021     Priority: Low    Type 2 diabetes mellitus with complication, without long-term current use of insulin ([address] 01/21/2021     Priority: Low    Weakness 01/21/2021     Priority: Low    Other dysphagia 01/21/2021     Priority: Low    Severe sepsis ([address] 01/18/2021     Priority: Low    Acute on chronic respiratory failure ([address] 01/18/2021     Priority: Low    Acute on chronic kidney failure ([address] 01/18/2021     Priority: Low    Hypoxemia 01/18/2021     Priority: Low    Metabolic acidosis 78/58/8652     Priority: Low    Acidosis, metabolic, with respiratory acidosis 01/18/2021     Priority: Low    History of bladder cancer 06/15/2020     Priority: Low    Bladder cancer ([address] 12/19/2018     Priority: Low    Postoperative pain 12/19/2018     Priority: Low    Epistaxis 11/06/2018     Priority: Low    Acute blood loss anemia 11/06/2018     Priority: Low    Melena 11/06/2018     Priority: Low    Hypocalcemia 11/06/2018     Priority: Low    Pneumonia due to infectious organism 11/06/2018     Priority: Low    Bleeding diathesis ([address] 11/05/2018     Priority: Low    BPH associated with nocturia      Priority: Low    Acute renal failure ([address]      Priority: Low    Shock liver      Priority: Low    Acute liver failure without hepatic coma 10/23/2018     Priority: Low    Acute kidney injury ([address] 10/23/2018     Priority: Low    CHF (congestive heart failure) ([address] 10/23/2018     Priority: Low    Bilateral pleural effusion 10/23/2018     Priority: Low    Elevated troponin 10/23/2018     Priority: Low    Palliative care patient 10/23/2018     Priority: Low    Arthritis of knee 10/15/2018     Priority: Low    Essential hypertension 10/15/2018     Priority: Low    Pure hypercholesterolemia 10/15/2018     Priority: Low    Slow transit constipation 10/15/2018     Priority: Low    Iron deficiency anemia 10/15/2018     Priority: Low    GERD (gastroesophageal reflux disease) 10/15/2018     Priority: Low    Primary osteoarthritis of left knee 10/14/2018     Priority: Low    Carotid stenosis, asymptomatic, left 08/28/2018     Priority: Low    Bilateral carotid artery stenosis 06/25/2018     Priority: Low    Atherosclerosis of native artery of both lower extremities with intermittent claudication ([facility] 06/25/2018     Priority: Low    Colonic diverticular abscess 08/01/2017     Priority: Low    Generalized abdominal pain      Priority: Low    Diverticulitis of large intestine with perforation without bleeding 06/04/2017     Priority: Low       Admit Date:  3/6/2021    Admission Problem List: Present on Admission:   Recurrent pneumonia      Cardiac Specific Data:  Specialty Problems        Cardiology Problems    Atherosclerosis of native artery of both lower extremities with intermittent claudication (HCC)        Bilateral carotid artery stenosis        Carotid stenosis, asymptomatic, left        Essential hypertension        Pure hypercholesterolemia        CHF (congestive heart failure) ([facility]              Subjective:  Mr. Chloe Duggan seen today underwent cardiac catheterization yesterday revealing 100% occlusion right coronary artery which appears chronic in nature well collateralized from the left no significant disease on the left. Today radial compression band still on still having mild oozing we will try and slowly withdraw air over the next few hours and get that off. Blood pressure 98 or 64 heart 72 sinus rhythm.   Recommend consideration dual-chamber pacemaker implantation planned tomorrow morning. Patient agreeable. Objective:   BP 98/64   Pulse 72   Temp 97.3 °F (36.3 °C) (Temporal)   Resp 18   Ht 6' (1.829 m)   Wt 254 lb 8 oz (115.4 kg)   SpO2 91%   BMI 34.52 kg/m²       Intake/Output Summary (Last 24 hours) at 3/11/2021 1303  Last data filed at 3/10/2021 1830  Gross per 24 hour   Intake 730 ml   Output 250 ml   Net 480 ml       Prior to Admission medications    Medication Sig Start Date End Date Taking?  Authorizing Provider   calcium carbonate (TUMS) 500 MG chewable tablet Take 1 tablet by mouth 3 times daily as needed for Heartburn 2/24/21 3/26/21  [facility], DO   hydrALAZINE (APRESOLINE) 25 MG tablet Take 1 tablet by mouth every 8 hours 2/24/21   [facility], DO   guaiFENesin (ROBITUSSIN) 100 MG/5ML syrup Take 10 mLs by mouth every 4 hours as needed for Cough 2/24/21   [facility], DO   famotidine (PEPCID) 20 MG tablet Take 1 tablet by mouth daily 2/24/21   [facility], DO   aspirin 81 MG EC tablet Take 1 tablet by mouth daily 1/30/21   Safia Marte MD   glipiZIDE (GLUCOTROL) 5 MG tablet Take 1 tablet by mouth every morning (before breakfast) 1/30/21   Safia Marte MD   atorvastatin (LIPITOR) 20 MG tablet Take 1 tablet by mouth daily 1/29/21   Safia Marte MD   metoprolol succinate (TOPROL XL) 100 MG extended release tablet Take 1 tablet by mouth daily 1/30/21   Safia Marte MD   NIFEdipine (ADALAT CC) 60 MG extended release tablet Take 1 tablet by mouth daily 1/30/21   Safia Marte MD   tamsulosin (FLOMAX) 0.4 MG capsule Take 1 capsule by mouth daily 1/30/21   Safia Marte MD   pantoprazole (PROTONIX) 40 MG tablet Take 1 tablet by mouth daily 1/30/21   Safia Marte MD   trospium (SANCTURA) 20 MG tablet Take 1 tablet by mouth nightly 1/29/21   Safia Marte MD        [START ON 3/12/2021] enoxaparin  40 mg Subcutaneous Daily    amiodarone  200 mg Oral BID    cefUROXime  500 mg Oral BID    [START ON 3/12/2021] predniSONE  15 mg Oral Daily    Followed by   James Enriquez ON 3/15/2021] predniSONE  10 mg Oral Daily    Followed by   James Enriquez ON 3/18/2021] predniSONE  5 mg Oral Daily    miconazole   Topical 4x Daily    tamsulosin  0.4 mg Oral BID    atropine  1 mg Intravenous Once    [Held by provider] dilTIAZem  30 mg Oral 3 times per day    ipratropium-albuterol  1 ampule Inhalation Q4H    aspirin  81 mg Oral Daily    atorvastatin  20 mg Oral Daily    insulin lispro  0-6 Units Subcutaneous TID     insulin lispro  0-3 Units Subcutaneous Nightly    hydrALAZINE  25 mg Oral 3 times per day    metoprolol succinate  100 mg Oral Daily    NIFEdipine  60 mg Oral Daily    pantoprazole  40 mg Oral Daily    trospium  20 mg Oral Nightly    sodium chloride flush  10 mL Intravenous 2 times per day       TELEMETRY: Sinus     Physical Exam:      Physical Exam      General:  Awake, alert, NAD  Skin:  Warm and dry  Neck:  no jvd , no carotid bruits  Chest:  Clear to auscultation, no wheezing or rales  Cardiovascular:  RRR V7U4 no murmurs, clicks, gallups, or rubs  Abdomen:  Soft nontender, nondistended, bowel sounds present  Extremities:  Edema: none      Lab Data:  CBC:   Recent Labs     03/09/21 0518 03/10/21  0528 03/11/21  0259   WBC 5.9 7.2 7.7   HGB 9.3* 7.8* 7.1*   HCT 30.5* 25.4* 22.7*   MCV 90.8 91.0 90.8    135 146     BMP:   Recent Labs     03/09/21 0518 03/10/21  0528 03/11/21  0259   * 137 138   K 4.2 4.4 4.9   CL 99 103 103   CO2 29 30* 26   BUN 46* 50* 54*   CREATININE 1.7* 1.7* 1.6*     LIVER PROFILE:   Recent Labs     03/11/21  0259   AST 18   ALT 26   BILITOT 0.3   ALKPHOS 68     PT/INR: No results for input(s): PROTIME, INR in the last 72 hours. APTT: No results for input(s): APTT in the last 72 hours. BNP:  No results for input(s): BNP in the last 72 hours.   CK, CKMB, Troponin: @LABRCNT (CKTOTAL:3, CKMB:3, TROPONINI:3)@    IMAGING:  Echo Complete 2d W Doppler W Color    Result Date: 3/8/2021  Transthoracic Echocardiography Report (TTE)  Demographics   Patient Name  Cristy Landa  Date of Study         03/08/2021                E   MRN           [id number]            Gender                Male   Date of Birth 1940        Room Number           OHE-0310   Age           79 year(s)   Height:       72 inches         Referring Physician   Jazmin Tate   Weight:       237.01 pounds     Sonographer           PATRICE Jarvis   BSA:          2.29 m^2          Interpreting          Andriy Loo MD                                  Physician   BMI:          32.14 kg/m^2  Procedure Type of Study   TTE procedure:ECHO 2D W/DOPPLER/CONTRAST LIMITD. Study Location: Portable Technical Quality: Adequate visualization Patient Status: Inpatient Contrast Medium: Definity. Amount - 3 ml BP: 129/71 mmHg  Conclusions   Summary  Limited echocardiographic study. LV appears normal in size with preserved LV systolic function. LV ejection  fraction estimated at 60-65%. RV not well visualized but appears normal in size with preserved systolic  function. .   Signature   ----------------------------------------------------------------  Electronically signed by Chandrika Loo MD(Interpreting physician)  on 03/08/2021 10:13 PM  ----------------------------------------------------------------  M-Mode Measurements (cm)   LVIDd: 4.48 cm                                  LVIDs: 3.01 cm  IVSd: 1.6 cm  LVPWd: 1.46 cm  % Ejection Fraction: 65 %      Echo Complete 2d W Doppler W Color    Result Date: 2/14/2021  Transthoracic Echocardiography Report (TTE)  Demographics   Patient Name  Cristy Landa  Date of Study         02/14/2021                E   MRN           [id number]            Gender                Male   Date of Birth 1940        Room Number           HKK-3193   Age           79 year(s)   Height:       72 inches         Referring Physician   lesley argueta   Weight:       232.01 pounds     Sonographer           PTARICE Jarvis   BSA: 2.27 m^2          Interpreting          Angela Liang MD                                  Physician   BMI:          31.47 kg/m^2  Procedure Type of Study   TTE procedure:ECHO NO CONTRAST WITH DOP/COLR. Study Location: Portable Technical Quality: Adequate visualization Patient Status: Inpatient BP: 112/69 mmHg Indications:Elevated Troponin. Conclusions   Signature   ----------------------------------------------------------------  Electronically signed by Angela Liang MD(Interpreting physician)  on 02/14/2021 02:35 PM  ----------------------------------------------------------------   Findings   Mitral Valve  moderate mitral regurgitation is present. Aortic Valve  Structurally normal aortic valve. Tricuspid Valve  Mild tricuspid regurgitation. RVSP cannot be determined accurately   Pulmonic Valve  The pulmonic valve was not well visualized . Left Atrium  Mildly dilated left atrium. Left Ventricle  Normal left ventricular size with preserved LV function and an estimated  ejection fraction of approximately 60-65%. Grade 2 diastolic dysfunction with increased LA filling pressure   Right Atrium  Normal right atrial dimension with no evidence of thrombus or mass noted. Right Ventricle  Normal right ventricular size with preserved RV function. Miscellaneous  IVC not studied  M-Mode Measurements (cm)   LVIDd: 4.9 cm                        LVIDs: 3.3 cm  IVSd: 1.58 cm  LVPWd: 1.37 cm                       AO Root Dimension: 2.6 cm  % Ejection Fraction: 61 %            LA:  3.8 cm                                       LVOT: 2.2 cm  Doppler Measurements:   AV Peak Gradient: 9.73 mmHg        MV Peak E-Wave: 107 cm/s                                     MV Peak A-Wave: 97.3 cm/s  TR Velocity:197 cm/s               MV E/A Ratio: 1.1 %  TR Gradient:15.52 mmHg             MV Peak Gradient: 4.58 mmHg  Estimated RAP:5 mmHg               MV P1/2t: 47 msec  RVSP:21 mmHg                       MVA by PHT4.68 cm^2      Ct Head Wo Contrast    Result Date: 2/18/2021  Examination. CT HEAD WO CONTRAST 2/18/2021 12:57 PM History: The patient complains of dizziness. DLP: 760 mGycm. The CT scan of the head is performed without intravenous contrast enhancement. The images are acquired in axial plane with subsequent reconstruction in coronal and sagittal planes. The comparison is made with the previous study dated 1/18/2021. No significant interval change. There is no evidence of a mass. No midline shift. There is no evidence of intracranial hemorrhage or hematoma. Moderately prominent ventricles, basal cisterns cortical sulci are similar to the previous study suggesting moderate chronic volume loss. There are extensive chronic white matter ischemic changes in the centrum semiovale bilaterally. The gray-white matter differentiation is maintained. The images reviewed in bone window show no acute bony abnormality. The visualized paranasal sinuses and mastoid air cells are unremarkable. There are severe atheromatous changes of the intracranial internal carotid arteries bilaterally. The orbits are unremarkable. No acute intracranial abnormality. Moderate cerebral cortical atrophy. Extensive chronic white matter ischemic changes. Signed by Dr Kenya Arriola on 2/18/2021 2:23 PM    Ct Chest Wo Contrast    Result Date: 2/13/2021  EXAMINATION: CT CHEST WO CONTRAST 2/13/2021 2:17 PM HISTORY: Shortness of breath, bilateral pneumonia. DOSE: 976 mGycm. Automatic exposure control was utilized in an effort to use as little radiation as possible, without compromising image quality. REPORT: Spiral CT of the chest was performed without contrast, reconstructed coronal and sagittal images are also reviewed. COMPARISON: Portable chest x-ray to 12/20/2021.  Review of lung windows demonstrates consolidative infiltrates in both lungs, on the right, this includes extensive infiltrate surrounding the right hilum, with contiguous infiltrate in the right upper lobe, with air bronchograms, there is also consolidation of the right lower lobe with air bronchograms and focal subpleural nodular infiltrates are identified in the right middle lobe and right upper lobe focally. In the left lung, there is a subpleural infiltrate in the left upper lobe along the major fissure, with mild paraseptal emphysematous changes. This is contiguous with mixed groundglass and consolidative infiltrates in the lingula and there is consolidation of the left lower lobe with air bronchograms, with associated bronchial wall thickening. A small amount of left perihilar infiltrate is also present. No pneumothorax is identified, there is a trace left pleural effusion, small right pleural effusion. No lung abscess or cavitation is seen in association with the areas of pneumonia. Soft tissue windows show a 12 mm low-attenuation lesion in the right thyroid lobe, which is most likely benign. There are normal-sized shotty appearing lymph nodes within the mediastinum without measurable lymphadenopathy. A small amount calcified plaques noted within the aortic arch and there is heavy calcified plaque within the coronary arteries. Heart size is normal. There is trace pericardial fluid. The visualized upper abdomen shows no acute abnormality. There is mild bilateral gynecomastia. Review of bone windows shows advanced degenerative changes in the visualized upper lumbar spine. Consolidating infiltrates within both lungs, greatest in the right lower lobe and most compatible with bilateral pneumonia, there is a small right pleural effusion and trace left pleural effusion. No pneumothorax is identified. There is no lung abscess or areas of cavitation. Mild underlying changes of paraseptal emphysema are noted. No measurable intrathoracic lymphadenopathy is identified. Signed by Dr Willie Barrera on 2/13/2021 2:28 PM    Us Renal Complete    Result Date: 2/13/2021  EXAMINATION: US RENAL COMPLETE 2/13/2021 3:16 PM HISTORY: Acute Stable 1 day appearance the chest. Signed by Dr Kimmy Thompson on 3/11/2021 7:20 AM    Xr Chest Portable    Result Date: 3/7/2021  Exam: XR CHEST PORTABLE - 3/7/2021 12:14 PM Indication: Central line placement Comparison: 3/6/2021 Findings: CVL tip overlies the SVC. Cardiac silhouette is stable. No significant change in bilateral interstitial and airspace opacity, greatest in the RIGHT lower lobe. No visible pneumothorax. No acute osseous finding. Impression: 1. CVL tip overlies the SVC. 2.  No significant change in bilateral interstitial and airspace opacity, greatest in the RIGHT lower lung. Signed by Dr Katelyn Morin on 3/7/2021 1:36 PM    Xr Chest Portable    Result Date: 3/6/2021  Exam: XR CHEST PORTABLE - 3/6/2021 11:52 AM Indication: Shortness of air Comparison: 2/12/2021 Findings: Persistent and slightly increased RIGHT greater than LEFT middle and lower lung zone airspace opacity. No large pleural effusion. No pneumothorax. Cardiac silhouette is stable. No acute osseous finding. Impression: Slight increase in bilateral RIGHT greater than LEFT middle and lower lung zone airspace opacity. Signed by Dr Katelyn Morin on 3/6/2021 1:14 PM    Xr Chest Portable    Result Date: 2/12/2021  EXAMINATION: XR CHEST PORTABLE 2/12/2021 4:13 PM HISTORY: Shortness of breath, hypoxia, recent pneumonia COMPARISON: 1/18/2021 FINDINGS: The heart appears normal in size. Atherosclerotic calcifications are seen in the aorta. Bilateral airspace opacities are present with relative sparing of the upper lobes, markedly increased compared to the prior exam. There is no appreciable pneumothorax or definite pleural effusion. Bilateral pneumonia, though worse when compared to the prior exam. Follow-up PA and lateral chest radiograph to recommended in 6-8 weeks to document resolution.  Signed by Dr Trey Betancourt on 2/12/2021 4:14 PM    Cta Pulmonary W Contrast    Result Date: 3/6/2021  Exam: CT chest angiography with 3D MIP images with IV contrast - 3/6/2021 12:33 PM Indication: Worsening respiratory function, history of pneumonia Comparison: 2/13/2021 DLP: 927 mGy cm. In order to have a CT radiation dose as low as reasonably achievable, Automated Exposure Control was utilized for adjustment of the mA and/or KV according to patient size. Findings: No evidence of pulmonary embolus. Main pulmonary artery is upper limits of normal in caliber. Thoracic aorta is nonaneurysmal. Atherosclerotic change in the aortic arch and coronary arteries noted. No pericardial effusion. Central airways are clear. Interlobular septal thickening. Slight interval worsening of multifocal bilateral consolidation and groundglass opacity. Disease burden is greatest in the RIGHT upper lobe. Small RIGHT greater than LEFT pleural effusions with adjacent atelectasis. No pneumothorax. Mild mediastinal lymphadenopathy similar to prior and likely reactive. 1.3 cm RIGHT thyroid nodule. No acute chest wall soft tissue abnormality. No acute osseous finding. Thoracic spine is scoliotic curvature and degenerative change. No acute osseous finding. Impression: 1. No evidence of pulmonary embolus. 2.  Slight progression of bilateral RIGHT greater than LEFT consolidation, suspicious for pneumonia. 3.  Small bilateral pleural effusions and interstitial pulmonary edema. Signed by Dr Thad Pate on 3/6/2021 1:56 PM        Assessment:  1. Recurrent pneumonia  2. Diverticular disease  3. Carotid artery stenosis  4. Peripheral arterial disease  5. Osteoarthritis  6. Hyperlipidemia  7. Hypertension  8. Iron deficiency anemia  9. Gastroesophageal reflux disease  10. Acute kidney injury  11. Epistaxis  12. Bladder cancer   13. Diabetes mellitus type 2  14. Nonsustained ventricular tachycardia  15. Echocardiogram 2/12/2021 ejection fraction 60 to 17% grade 2 diastolic dysfunction dilated left atrium  16. 5.70-second pause documented evening of 3/7/2021  17.  Chronic kidney disease creatinine 1.6  18. Cardiac catheterization 3/10/2021 100% occlusion right coronary artery well collateralized from the left       Plan:  1. Continue current treatment recommend dual-chamber pacemaker implantation tentatively planned for tomorrow morning advised indications alternatives benefits and risk patient agreeable. I have discussed with the patient regarding indications for the proposed procedure ICD/ Pacemaker implantation along with possible alternatives benefits and risks including but not limited to risks of death, myocardial infarction, stroke, contrast induced nephropathy which in some cases may lead to acute kidney failure requiring dialysis, allergic reactions, infections which may require treatment with antibiotics or removal of the device, bleeding requiring blood transfusion,  cardiac arrhthymias, respiratory failure which may require placing the patient on respiratory support such as a ventilator or breathing machine,risk of complications which may require vascular surgery,risks of collapsing the lung with development of a pneumothorax which may require placement of a chest tube, and risks of lead dislodgement which may require follow up surgery and repositioning of the leads. In addition there are long term risks including infection, and device or component failure which may require removal and/or replacement of various components. Risk of wound nonhealing t subsequent erosion etc. also discussed. We also discussed the risk of potential stroke or thromboembolic phenomena during the timeframe that the patient may be off off of anticoagulation. The patient is advised the device battery will eventually become depleted and require replacement at some point. The patient is awake and alert and understands the issues involved and indicates willingness to proceed as ordered. The patient is  a reasonable candidate for moderate conscious sedation.     ASA score:  ASA 3 - Patient with moderate systemic disease with functional limitations    Mallampati: I (soft palate, uvula, fauces, tonsillar pillars visible)    Preferred vascular access site will be: left subclavian vein.         Maylin Kingsley MD 3/11/2021 1:03 PM

## 2021-03-11 NOTE — PROGRESS NOTES
Pt returned from [address] [place] [address]. No complaints. HR sinus. TR band on R radial cath site, no bleeding noted. Pulses palpable. Good CTMPS to R fingers. R FA site which had small pencil eraser size bruise which started bleeding when pt was bathing prior to cath at 1335 is now bleeding at that site again. Tape and dressing were needed removed for radial cath access and started bleeding in cath lab. Still bleeding through gauze that cath lab placed. Will redress.  Electronically signed by Luis Red RN on 3/10/2021 at 8:43 PM

## 2021-03-11 NOTE — PROGRESS NOTES
Went to pt's room and found his arm in a pool of blood. Both from cath site oozing and skin tear. 2mL added to TR band (was 17mL, now 19mL) Pt cleaned up and a quick clot pressure dressing was applied.     Electronically signed by Charmayne Feinstein, RN on 3/11/2021 at 4:15 AM

## 2021-03-11 NOTE — PROGRESS NOTES
Subjective:   Critical Care Daily Progress Note: 3/11/2021 4:15 PM    Revised Cardiac Risk Index \"Jc Criteria\"  No CHF  No CAD  Serum Cr <2.0  Not Intrathoracic Surgery  No CVA  No IDDM  0/6 Risk Criteria  Estimated Risk of Adverse Outcome with Non-Cardiac Surgery \"Very Low Risk\"  Estimated rate of MI, Pulmonary Edema, V Fib, Cardiac Arrest, or Complete Heart Block 0.4%    Temp Range last 72 hours: 96.5F to 98.3F, typically 97-98F   Ref.  Range 3/6/2021 12:31 3/7/2021 03:06 3/8/2021 04:00 3/9/2021 05:18 3/10/2021 05:28 3/11/2021 02:59   WBC Latest Ref Range: 4.8 - 10.8 K/uL 6.5 3.5 (L) 6.0 5.9 7.2 7.7     Neutrophils Absolute Latest Ref Range: 1.5 - 7.5 K/uL 5.2 2.8 5.1 5.1 5.7 5.6     i do not believe that the patient has had persistant bacteremia on antibiotics      Interval History:   Patient now s/p Cardiac Cath has extensive RCA disease with absent disease in left  Has been recommended for Pacer by cardio, they plan to place in the AM  Drinking prune juice when seen  In good spirits    Scheduled Meds:   [Held by provider] enoxaparin  40 mg Subcutaneous Daily    amiodarone  200 mg Oral BID    cefUROXime  500 mg Oral BID    [START ON 3/12/2021] predniSONE  15 mg Oral Daily    Followed by   Grecia Garcia ON 3/15/2021] predniSONE  10 mg Oral Daily    Followed by   Grecia Garcia ON 3/18/2021] predniSONE  5 mg Oral Daily    miconazole   Topical 4x Daily    tamsulosin  0.4 mg Oral BID    atropine  1 mg Intravenous Once    [Held by provider] dilTIAZem  30 mg Oral 3 times per day    ipratropium-albuterol  1 ampule Inhalation Q4H    aspirin  81 mg Oral Daily    atorvastatin  20 mg Oral Daily    insulin lispro  0-6 Units Subcutaneous TID WC    insulin lispro  0-3 Units Subcutaneous Nightly    hydrALAZINE  25 mg Oral 3 times per day    metoprolol succinate  100 mg Oral Daily    NIFEdipine  60 mg Oral Daily    pantoprazole  40 mg Oral Daily    trospium  20 mg Oral Nightly    sodium chloride flush  10 mL Intravenous 2 times per day     Continuous Infusions:   sodium chloride 75 mL/hr at 03/10/21 1934    amiodarone 0.5 mg/min (03/09/21 0138)    dextrose       PRN Meds:ondansetron, labetalol, calcium carbonate, guaiFENesin, sodium chloride flush, polyethylene glycol, acetaminophen **OR** acetaminophen, glucose, dextrose, glucagon (rDNA), dextrose        Objective:     Vitals reviewed. I/O last 3 completed shifts: In: 2487 [P.O.:360; I.V.:730]  Out: 800 [Emesis/NG output:800]  No intake/output data recorded. BP 98/64   Pulse 71   Temp 97.2 °F (36.2 °C) (Temporal)   Resp 18   Ht 6' (1.829 m)   Wt 254 lb 8 oz (115.4 kg)   SpO2 100%   BMI 34.52 kg/m²     Physical Exam  Vitals signs reviewed. Exam conducted with a chaperone present. Constitutional:       General: He is not in acute distress. Appearance: Normal appearance. He is obese. He is not ill-appearing or toxic-appearing. HENT:      Head: Normocephalic and atraumatic. Nose: No congestion or rhinorrhea. Eyes:      General:         Right eye: No discharge. Left eye: No discharge. Neck:      Musculoskeletal: Neck supple. Comments: Trachea appears midline  Cardiovascular:      Rate and Rhythm: Normal rate and regular rhythm. Heart sounds: No murmur. No friction rub. No gallop. Pulmonary:      Effort: Pulmonary effort is normal. No respiratory distress. Breath sounds: No stridor. No wheezing, rhonchi or rales. Chest:      Chest wall: No tenderness. Abdominal:      General: Bowel sounds are normal.      Tenderness: There is no abdominal tenderness. There is no guarding or rebound. Musculoskeletal:      Comments: Has increased fat stores, no wasting of muscle stores   Skin:     General: Skin is warm. Comments: nondiaphoretic   Neurological:      Mental Status: He is alert. Cranial Nerves: No dysarthria. Motor: No tremor or seizure activity.    Psychiatric:         Mood and Affect: Mood normal. Behavior: Behavior normal.       Results for Elisa Camacho (MRN [id number]) as of 3/11/2021 16:13   Ref.  Range 3/6/2021 12:31 3/7/2021 03:06 3/8/2021 04:00 3/9/2021 05:18 3/10/2021 05:28 3/11/2021 02:59   WBC Latest Ref Range: 4.8 - 10.8 K/uL 6.5 3.5 (L) 6.0 5.9 7.2 7.7   RBC Latest Ref Range: 4.70 - 6.10 M/uL 4.06 (L) 3.59 (L) 3.45 (L) 3.36 (L) 2.79 (L) 2.50 (L)   Hemoglobin Quant Latest Ref Range: 14.0 - 18.0 g/dL 11.2 (L) 9.9 (L) 9.7 (L) 9.3 (L) 7.8 (L) 7.1 (L)   Hematocrit Latest Ref Range: 42.0 - 52.0 % 37.8 (L) 33.0 (L) 31.4 (L) 30.5 (L) 25.4 (L) 22.7 (L)   MCV Latest Ref Range: 80.0 - 94.0 fL 93.1 91.9 91.0 90.8 91.0 90.8   MCH Latest Ref Range: 27.0 - 31.0 pg 27.6 27.6 28.1 27.7 28.0 28.4   MCHC Latest Ref Range: 33.0 - 37.0 g/dL 29.6 (L) 30.0 (L) 30.9 (L) 30.5 (L) 30.7 (L) 31.3 (L)   MPV Latest Ref Range: 9.4 - 12.4 fL 10.7 11.6 12.3 12.2 11.7 12.6 (H)   RDW Latest Ref Range: 11.5 - 14.5 % 14.8 (H) 14.4 14.6 (H) 14.6 (H) 14.6 (H) 14.7 (H)   Platelet Count Latest Ref Range: 130 - 400 K/uL 131 99 (L) 140 155 135 146   Neutrophils % Latest Ref Range: 50.0 - 65.0 % 79.5 (H) 79.5 (H) 85.8 (H) 85.6 (H) 79.0 (H) 75.9 (H)   Lymphocyte % Latest Ref Range: 20.0 - 40.0 % 7.1 (L) 13.1 (L) 7.7 (L) 5.4 (L) 6.4 (L) 7.9 (L)   Monocytes % Latest Ref Range: 0.0 - 10.0 % 10.8 (H) 4.0 5.0 6.6 11.4 (H) 10.5 (H)   Eosinophils % Latest Ref Range: 0.0 - 5.0 % 0.0 0.0 0.0 0.0 0.0 0.0   Basophils % Latest Ref Range: 0.0 - 1.0 % 0.3 0.3 0.0 0.2 0.0 0.1   Neutrophils Absolute Latest Ref Range: 1.5 - 7.5 K/uL 5.2 2.8 5.1 5.1 5.7 5.6   Immature Granulocytes # Latest Units: K/uL 0.2 0.1 0.1 0.1 0.2 0.4   Lymphocytes Absolute Latest Ref Range: 1.1 - 4.5 K/uL 0.5 (L) 0.5 (L) 0.5 (L) 0.3 (L) 0.5 (L) 0.6 (L)   Monocytes Absolute Latest Ref Range: 0.00 - 0.90 K/uL 0.70 0.10 0.30 0.40 0.80 0.80   Eosinophils Absolute Latest Ref Range: 0.00 - 0.60 K/uL 0.00 0.00 0.00 0.00 0.00 0.00   Basophils Absolute Latest Ref Range: 0.00 - 0.20 K/uL 0.00 Patient will require routine outpatient pulmonary/cardiology follow-up     COPD exacerbation  Steroids, tapering course of PO ordered  Nebs  Antibiotics  Goal sats 88-92%     Acute hypoxemic and hypercapnic respiratory failure  Secondary to above processes  BiPAP since weaned off  Currently on 2 L via NC, decreasing requirement  Follow ABG/CXR  Oximetry studies as warranted     NSVT  Multiple episodes 3/7/2021  Asymptomatic  Hemodynamically stable  Alert and oriented x3  Amiodarone gtt  Metoprolol/nifedipine already on board, caution sinus pauses  Shock as warranted  Follow lytes  Serial troponin 0.03 > 0.02 > 0.02  Follow EKG  Continue telemetry monitoring  TTE 3/8/2021: EF 60-65%, LV and RV appear normal  Cardiology following, recs event recorder upon discharge  Patient will require routine outpatient cardiology/pulmonary follow-up  -  for pacer on Friday AM - [id number]     Chronically elevated troponin:  Denies chest pain  Current level lower than multiple prior levels     Hx tobacco dependence:  Counseled on continued cessation- done already     CKD 3:  Follow renal function/urine output  Avoid offending agents  Previously required RRT for 3.5 months     DM2:   Lispro ISS  Hypoglycemic order set       DVT prophylaxis: Lovenox       Critical Care Time greater than: 30 Minutes.

## 2021-03-11 NOTE — FLOWSHEET NOTE
03/11/21 1700   Encounter Summary   Services provided to: Patient   Referral/Consult From: Nurse   Support System Children   Complexity of Encounter High   Length of Encounter 30 minutes   Advance Care Planning Yes   Spiritual/Adventism   Type Spiritual struggle   Assessment Anxious; Coping   Intervention Active listening;Explored feelings, thoughts, concerns;Sustaining presence/ Ministry of presence   Outcome Expressed gratitude     Received consults from the nurse to assist with LW. Mr. Ruy Doran is concern about up coming heart procedure. He is interesting in setting up [employer]. Pointed his daughter Regina Moran as HCS (in [place]). He wants no other decision makers. He will notify me once completed the paper work, hopefully by Saturday.    Electronically signed by Gómez Ibarra on 3/11/2021 at 5:46 PM

## 2021-03-11 NOTE — PROGRESS NOTES
Have notified dietary of order for patient to receive prune juice every two hours while awake.    Electronically signed by Preeti Castanon RN on 3/11/2021 at 1:16 PM

## 2021-03-11 NOTE — PROGRESS NOTES
Pt's RFA started bleeding. Saturated previous dressing. New dressing applied. Cath site intact and no changes at this time. New pressure dressing applied.     Electronically signed by Zee Olmedo RN on 3/10/2021 at 10:27 PM

## 2021-03-11 NOTE — PROGRESS NOTES
Pulmonary and Critical Care Progress note. [address] [place] [address]    MRN# [id number]    Acct# [de-identified]  3/11/2021   1130 AM CST    Referring Mayo Fisher MD      Chief Complaint: Shortness of breath. HPI: over the past 24 hours he continues to feel better. Shortness is breath is improved. Have a heart cath yesterday the findings as documented by cardiology. He has been using the BiPAP during sleep.     Medications    [START ON 3/12/2021] enoxaparin, 40 mg, Subcutaneous, Daily    amiodarone, 200 mg, Oral, BID    cefUROXime, 500 mg, Oral, BID    predniSONE, 20 mg, Oral, Daily **FOLLOWED BY** [START ON 3/12/2021] predniSONE, 15 mg, Oral, Daily **FOLLOWED BY** [START ON 3/15/2021] predniSONE, 10 mg, Oral, Daily **FOLLOWED BY** [START ON 3/18/2021] predniSONE, 5 mg, Oral, Daily    miconazole, , Topical, 4x Daily    tamsulosin, 0.4 mg, Oral, BID    atropine, 1 mg, Intravenous, Once    [Held by provider] dilTIAZem, 30 mg, Oral, 3 times per day    ipratropium-albuterol, 1 ampule, Inhalation, Q4H    aspirin, 81 mg, Oral, Daily    atorvastatin, 20 mg, Oral, Daily    insulin lispro, 0-6 Units, Subcutaneous, TID WC    insulin lispro, 0-3 Units, Subcutaneous, Nightly    hydrALAZINE, 25 mg, Oral, 3 times per day    metoprolol succinate, 100 mg, Oral, Daily    NIFEdipine, 60 mg, Oral, Daily    pantoprazole, 40 mg, Oral, Daily    trospium, 20 mg, Oral, Nightly    sodium chloride flush, 10 mL, Intravenous, 2 times per day     Review of Systems:  RESPIRATORY:  positive for  dyspnea  CARDIOVASCULAR:  positive for  dyspnea  GASTROINTESTINAL:  negative  GENITOURINARY:  negative    Physical Exam:  BP (!) 93/57   Pulse 64   Temp 98 °F (36.7 °C) (Temporal)   Resp 18   Ht 6' (1.829 m)   Wt 254 lb 8 oz (115.4 kg)   SpO2 94%   BMI 34.52 kg/m²     Intake/Output Summary (Last 24 hours) at 3/11/2021 1044  Last data filed at 3/10/2021 1830  Gross per 24 hour   Intake 730 ml Output 250 ml   Net 480 ml       General Appearance: alert and oriented to person, place and time, well-developed and well-nourished, in no acute distress  ENT: tympanic membrane, external ear and ear canal normal bilaterally, oropharynx clear and moist with normal mucous membranes  Pulmonary/Chest: diminished bilaterally, no rubs or tenderness or dullness to percussion. Cardiovascular: normal rate, normal S1 and S2, no gallops, intact distal pulses and no carotid bruits  Abdomen: soft, non-tender, non-distended, normal bowel sounds, no masses or organomegaly  Extremities: no cyanosis, no clubbing and 1 + edema-  bilateral   Neurologic: no focal findings. Recent Labs     03/09/21  0518 03/10/21  0528 03/11/21  0259   WBC 5.9 7.2 7.7   RBC 3.36* 2.79* 2.50*   HGB 9.3* 7.8* 7.1*   HCT 30.5* 25.4* 22.7*    135 146   MCV 90.8 91.0 90.8   MCH 27.7 28.0 28.4   MCHC 30.5* 30.7* 31.3*   RDW 14.6* 14.6* 14.7*      Recent Labs     03/09/21  0518 03/10/21  0528 03/11/21  0259   * 137 138   K 4.2 4.4 4.9   CL 99 103 103   CO2 29 30* 26   BUN 46* 50* 54*   CREATININE 1.7* 1.7* 1.6*   CALCIUM 8.1* 7.8* 7.6*   GLUCOSE 234* 180* 254*      No results for input(s): PHART, DCR7TOQ, PO2ART, VRP3FBS, T0KHCUHN, BEART in the last 72 hours. Recent Labs     03/08/21  1515 03/08/21  1515 03/10/21  0528 03/11/21  0259   AST  --   --   --  18   ALT  --   --   --  26   ALKPHOS  --   --   --  68   BILITOT  --   --   --  0.3   MG  --    < > 1.8  --    CALCIUM  --    < > 7.8* 7.6*   PROCAL 0.07  --   --   --     < > = values in this interval not displayed. No results for input(s): BC, LABGRAM, CULTRESP, BFCX in the last 72 hours. Radiograph: Xr Chest Portable    Result Date: 3/7/2021  Impression: 1. CVL tip overlies the SVC. 2.  No significant change in bilateral interstitial and airspace opacity, greatest in the RIGHT lower lung.  Signed by Dr Nola Chandler on 3/7/2021 1:36 PM    Xr Chest Portable    Result Date: 3/6/2021  Impression: Slight increase in bilateral RIGHT greater than LEFT middle and lower lung zone airspace opacity. Signed by Dr Kenyetta Cleveland on 3/6/2021 1:14 PM    Cta Pulmonary W Contrast    Result Date: 3/6/2021  Impression: 1. No evidence of pulmonary embolus. 2.  Slight progression of bilateral RIGHT greater than LEFT consolidation, suspicious for pneumonia. 3.  Small bilateral pleural effusions and interstitial pulmonary edema. Signed by Dr Kenyetta Cleveland on 3/6/2021 1:56 PM       My radiograph interpretation/independent review of imaging: Chest x-ray shows improved findings to my eye. Problem list generated by [email or web address]:  Hospital Problems           Last Modified POA    Recurrent pneumonia 3/6/2021 Yes           Pulmonary Assessment/Plan:     1. Acute on chronic hypoxic hypercapnic respiratory failure, continue current management with oxygen supplementation and noninvasive ventilation while sleeping to assure adequate gas exchange. He will benefit from noninvasive ventilation such as BiPAP or AVAPS as outpatient. He indicates that he is willing to use machine. We will consult home health and case management. 2. No clinical evidence of infectious pneumonia. On oral antibiotics. 3. Anemia ? Due to blood draws. No evidence of active bleeding. 4. Acute on chronic congestive heart failure. Cath findings as documented per cardiology. Possible pacemaker for sinus pause. 5. Dyspnea multifactorial possibly secondary to the above. Supportive care. Improved. 6. Possible underlying obstructive sleep apnea. Consider BiPAP therapy on discharge home due to his hypercapnic respiratory failure. 7. DVT prophylaxis.           Electronically signed by Re Courtney MD on 03/11/21 at 10:44 AM

## 2021-03-12 ENCOUNTER — APPOINTMENT (OUTPATIENT)
Dept: CARDIAC CATH/INVASIVE PROCEDURES | Age: 81
DRG: 981 | End: 2021-03-12
Payer: MEDICARE

## 2021-03-12 LAB
ABO/RH: NORMAL
ANTIBODY SCREEN: NORMAL
BASOPHILS ABSOLUTE: 0 K/UL (ref 0–0.2)
BASOPHILS RELATIVE PERCENT: 0.3 % (ref 0–1)
BLOOD BANK DISPENSE STATUS: NORMAL
BLOOD BANK PRODUCT CODE: NORMAL
BPU ID: NORMAL
DESCRIPTION BLOOD BANK: NORMAL
EOSINOPHILS ABSOLUTE: 0 K/UL (ref 0–0.6)
EOSINOPHILS RELATIVE PERCENT: 0 % (ref 0–5)
GLUCOSE BLD-MCNC: 183 MG/DL (ref 70–99)
GLUCOSE BLD-MCNC: 217 MG/DL (ref 70–99)
GLUCOSE BLD-MCNC: 227 MG/DL (ref 70–99)
GLUCOSE BLD-MCNC: 234 MG/DL (ref 70–99)
HCT VFR BLD CALC: 20.4 % (ref 42–52)
HCT VFR BLD CALC: 24.6 % (ref 42–52)
HEMOGLOBIN: 6.3 G/DL (ref 14–18)
HEMOGLOBIN: 7.7 G/DL (ref 14–18)
IMMATURE GRANULOCYTES #: 0.6 K/UL
LYMPHOCYTES ABSOLUTE: 1 K/UL (ref 1.1–4.5)
LYMPHOCYTES RELATIVE PERCENT: 13.4 % (ref 20–40)
MCH RBC QN AUTO: 28.3 PG (ref 27–31)
MCHC RBC AUTO-ENTMCNC: 30.9 G/DL (ref 33–37)
MCV RBC AUTO: 91.5 FL (ref 80–94)
MONOCYTES ABSOLUTE: 0.9 K/UL (ref 0–0.9)
MONOCYTES RELATIVE PERCENT: 11.5 % (ref 0–10)
NEUTROPHILS ABSOLUTE: 4.9 K/UL (ref 1.5–7.5)
NEUTROPHILS RELATIVE PERCENT: 66.4 % (ref 50–65)
PDW BLD-RTO: 15.1 % (ref 11.5–14.5)
PERFORMED ON: ABNORMAL
PLATELET # BLD: 137 K/UL (ref 130–400)
PMV BLD AUTO: 13 FL (ref 9.4–12.4)
RBC # BLD: 2.23 M/UL (ref 4.7–6.1)
WBC # BLD: 7.4 K/UL (ref 4.8–10.8)

## 2021-03-12 PROCEDURE — 2580000003 HC RX 258: Performed by: INTERNAL MEDICINE

## 2021-03-12 PROCEDURE — 85014 HEMATOCRIT: CPT

## 2021-03-12 PROCEDURE — 36592 COLLECT BLOOD FROM PICC: CPT

## 2021-03-12 PROCEDURE — 86900 BLOOD TYPING SEROLOGIC ABO: CPT

## 2021-03-12 PROCEDURE — 2700000000 HC OXYGEN THERAPY PER DAY

## 2021-03-12 PROCEDURE — 94660 CPAP INITIATION&MGMT: CPT

## 2021-03-12 PROCEDURE — 85025 COMPLETE CBC W/AUTO DIFF WBC: CPT

## 2021-03-12 PROCEDURE — 6370000000 HC RX 637 (ALT 250 FOR IP): Performed by: INTERNAL MEDICINE

## 2021-03-12 PROCEDURE — P9016 RBC LEUKOCYTES REDUCED: HCPCS

## 2021-03-12 PROCEDURE — 86850 RBC ANTIBODY SCREEN: CPT

## 2021-03-12 PROCEDURE — 85018 HEMOGLOBIN: CPT

## 2021-03-12 PROCEDURE — 99233 SBSQ HOSP IP/OBS HIGH 50: CPT | Performed by: INTERNAL MEDICINE

## 2021-03-12 PROCEDURE — 2140000000 HC CCU INTERMEDIATE R&B

## 2021-03-12 PROCEDURE — 99232 SBSQ HOSP IP/OBS MODERATE 35: CPT | Performed by: INTERNAL MEDICINE

## 2021-03-12 PROCEDURE — 82947 ASSAY GLUCOSE BLOOD QUANT: CPT

## 2021-03-12 PROCEDURE — 94640 AIRWAY INHALATION TREATMENT: CPT

## 2021-03-12 PROCEDURE — 86901 BLOOD TYPING SEROLOGIC RH(D): CPT

## 2021-03-12 PROCEDURE — 6370000000 HC RX 637 (ALT 250 FOR IP): Performed by: HOSPITALIST

## 2021-03-12 PROCEDURE — 93971 EXTREMITY STUDY: CPT

## 2021-03-12 PROCEDURE — 86923 COMPATIBILITY TEST ELECTRIC: CPT

## 2021-03-12 PROCEDURE — 36430 TRANSFUSION BLD/BLD COMPNT: CPT

## 2021-03-12 PROCEDURE — 36415 COLL VENOUS BLD VENIPUNCTURE: CPT

## 2021-03-12 RX ORDER — SODIUM CHLORIDE 9 MG/ML
INJECTION, SOLUTION INTRAVENOUS PRN
Status: DISCONTINUED | OUTPATIENT
Start: 2021-03-12 | End: 2021-03-14 | Stop reason: SDUPTHER

## 2021-03-12 RX ADMIN — IPRATROPIUM BROMIDE AND ALBUTEROL SULFATE 1 AMPULE: 2.5; .5 SOLUTION RESPIRATORY (INHALATION) at 14:40

## 2021-03-12 RX ADMIN — IPRATROPIUM BROMIDE AND ALBUTEROL SULFATE 1 AMPULE: 2.5; .5 SOLUTION RESPIRATORY (INHALATION) at 19:42

## 2021-03-12 RX ADMIN — IPRATROPIUM BROMIDE AND ALBUTEROL SULFATE 1 AMPULE: 2.5; .5 SOLUTION RESPIRATORY (INHALATION) at 06:40

## 2021-03-12 RX ADMIN — MICONAZOLE NITRATE: 2 POWDER TOPICAL at 10:14

## 2021-03-12 RX ADMIN — IPRATROPIUM BROMIDE AND ALBUTEROL SULFATE 1 AMPULE: 2.5; .5 SOLUTION RESPIRATORY (INHALATION) at 10:28

## 2021-03-12 RX ADMIN — ATORVASTATIN CALCIUM 20 MG: 20 TABLET, FILM COATED ORAL at 21:28

## 2021-03-12 RX ADMIN — AMIODARONE HYDROCHLORIDE 200 MG: 200 TABLET ORAL at 21:17

## 2021-03-12 RX ADMIN — CEFUROXIME AXETIL 500 MG: 250 TABLET ORAL at 21:19

## 2021-03-12 RX ADMIN — PREDNISONE 15 MG: 10 TABLET ORAL at 21:33

## 2021-03-12 RX ADMIN — MICONAZOLE NITRATE: 2 POWDER TOPICAL at 21:20

## 2021-03-12 RX ADMIN — TROSPIUM CHLORIDE 20 MG: 20 TABLET, FILM COATED ORAL at 21:19

## 2021-03-12 RX ADMIN — SODIUM CHLORIDE, PRESERVATIVE FREE 10 ML: 5 INJECTION INTRAVENOUS at 21:20

## 2021-03-12 RX ADMIN — INSULIN LISPRO 2 UNITS: 100 INJECTION, SOLUTION INTRAVENOUS; SUBCUTANEOUS at 18:42

## 2021-03-12 RX ADMIN — SODIUM CHLORIDE, PRESERVATIVE FREE 10 ML: 5 INJECTION INTRAVENOUS at 17:24

## 2021-03-12 RX ADMIN — SODIUM CHLORIDE: 9 INJECTION, SOLUTION INTRAVENOUS at 06:10

## 2021-03-12 RX ADMIN — TAMSULOSIN HYDROCHLORIDE 0.4 MG: 0.4 CAPSULE ORAL at 21:17

## 2021-03-12 RX ADMIN — INSULIN LISPRO 1 UNITS: 100 INJECTION, SOLUTION INTRAVENOUS; SUBCUTANEOUS at 21:32

## 2021-03-12 NOTE — PROGRESS NOTES
Placed pt on bipap at 2300 per pt request. Called to bedside at 0100. Pt stated bipap was not functioning properly and was alarming too often. found no problem with bipap nor did it alarm while I was with the pt. Pt states he will not wear bipap again until bipap  comes and fixes it. Pt seems very agitated and unruly. Nurse informed.

## 2021-03-12 NOTE — PROGRESS NOTES
Speech Language Pathology  Facility/Department: [facility] PROGRESSIVE CARE    NAME: Laurel Moore  : 1940  MRN: [id number]     Patient NPO.  Will follow and monitor swallow function when cleared for PO intake by MD.    Electronically signed by HALINA Berry on 3/12/2021 at 12:30 PM

## 2021-03-12 NOTE — PROGRESS NOTES
Thus far, have been able to remove 6 ml of air from right radial TR band. Unable to remove additional air at this time due to oozing at site. Patient has hematoma at central line site. Also has large hematoma left upper arm. Has capillary refill less than three seconds, +2 radial pulses, and full sensation bilateral hands and fingers. Have informed Whitney Nicholson with cardiology and Dr Jagdeep Mejia hospitalist. Will continue to monitor patient.    Electronically signed by Luisa Bledsoe RN on 3/11/2021 at 7:38 PM

## 2021-03-12 NOTE — PROGRESS NOTES
Pulmonary and Critical Care Progress note. [address] [place] [address]    MRN# [id number]    Acct# [de-identified]  3/12/2021   230 PM CST    Referring Nakul Garner MD      Chief Complaint: Shortness of breath. HPI: over the past 24 hours he continues to feel better. Shortness is breath is improved. Have a heart cath yesterday the findings as documented by cardiology. He has been using the BiPAP during sleep. He is scheduled for pacemaker placement today.     Medications  [Held by provider] enoxaparin, 40 mg, Subcutaneous, Daily    chlorhexidine, , Topical, Once    amiodarone, 200 mg, Oral, BID    cefUROXime, 500 mg, Oral, BID    [COMPLETED] predniSONE, 20 mg, Oral, Daily **FOLLOWED BY** predniSONE, 15 mg, Oral, Daily **FOLLOWED BY** [START ON 3/15/2021] predniSONE, 10 mg, Oral, Daily **FOLLOWED BY** [START ON 3/18/2021] predniSONE, 5 mg, Oral, Daily    miconazole, , Topical, 4x Daily    tamsulosin, 0.4 mg, Oral, BID    atropine, 1 mg, Intravenous, Once    [Held by provider] dilTIAZem, 30 mg, Oral, 3 times per day    ipratropium-albuterol, 1 ampule, Inhalation, Q4H    aspirin, 81 mg, Oral, Daily    atorvastatin, 20 mg, Oral, Daily    insulin lispro, 0-6 Units, Subcutaneous, TID WC    insulin lispro, 0-3 Units, Subcutaneous, Nightly    hydrALAZINE, 25 mg, Oral, 3 times per day    metoprolol succinate, 100 mg, Oral, Daily    NIFEdipine, 60 mg, Oral, Daily    pantoprazole, 40 mg, Oral, Daily    trospium, 20 mg, Oral, Nightly    sodium chloride flush, 10 mL, Intravenous, 2 times per day     Review of Systems:  RESPIRATORY:  positive for  dyspnea  CARDIOVASCULAR:  positive for  dyspnea  GASTROINTESTINAL:  negative  GENITOURINARY:  negative    Physical Exam:  BP (!) 103/56   Pulse 70   Temp 97.1 °F (36.2 °C) (Temporal)   Resp 18   Ht 6' (1.829 m)   Wt 249 lb 14.4 oz (113.4 kg)   SpO2 94%   BMI 33.89 kg/m²     Intake/Output Summary (Last 24 hours) at 3/12/2021 9091 Last data filed at 3/12/2021 1433  Gross per 24 hour   Intake    Output 1200 ml   Net -1200 ml       General Appearance: alert and oriented to person, place and time, well-developed and well-nourished, in no acute distress  ENT: tympanic membrane, external ear and ear canal normal bilaterally, oropharynx clear and moist with normal mucous membranes  Pulmonary/Chest: diminished bilaterally, no rubs or tenderness or dullness to percussion. Cardiovascular: normal rate, normal S1 and S2, no gallops, intact distal pulses and no carotid bruits  Abdomen: soft, non-tender, non-distended, normal bowel sounds, no masses or organomegaly  Extremities: no cyanosis, no clubbing and 1 + edema-  bilateral   Neurologic: no focal findings. Recent Labs     03/10/21  0528 03/11/21  0259 03/12/21  0350 03/12/21  1350   WBC 7.2 7.7 7.4  --    RBC 2.79* 2.50* 2.23*  --    HGB 7.8* 7.1* 6.3* 7.7*   HCT 25.4* 22.7* 20.4* 24.6*    146 137  --    MCV 91.0 90.8 91.5  --    MCH 28.0 28.4 28.3  --    MCHC 30.7* 31.3* 30.9*  --    RDW 14.6* 14.7* 15.1*  --       Recent Labs     03/10/21  0528 03/11/21  0259    138   K 4.4 4.9    103   CO2 30* 26   BUN 50* 54*   CREATININE 1.7* 1.6*   CALCIUM 7.8* 7.6*   GLUCOSE 180* 254*      No results for input(s): PHART, FUZ6MAK, PO2ART, ADX7FOD, V6QZPVVL, BEART in the last 72 hours. Recent Labs     03/10/21  0528 03/11/21  0259   AST  --  18   ALT  --  26   ALKPHOS  --  68   BILITOT  --  0.3   MG 1.8  --    CALCIUM 7.8* 7.6*     No results for input(s): BC, LABGRAM, CULTRESP, BFCX in the last 72 hours. Radiograph: Xr Chest Portable    Result Date: 3/7/2021  Impression: 1. CVL tip overlies the SVC. 2.  No significant change in bilateral interstitial and airspace opacity, greatest in the RIGHT lower lung.  Signed by Dr Danitza Oliva on 3/7/2021 1:36 PM    Xr Chest Portable    Result Date: 3/6/2021  Impression: Slight increase in bilateral RIGHT greater than LEFT middle and lower

## 2021-03-12 NOTE — PROGRESS NOTES
Subjective:   Critical Care Daily Progress Note: 3/12/2021 2:03 PM    Revised Cardiac Risk Index \"Jc Criteria\"  No CHF  No CAD  Serum Cr <2.0  Not Intrathoracic Surgery  No CVA  No IDDM  0/6 Risk Criteria  Estimated Risk of Adverse Outcome with Non-Cardiac Surgery \"Very Low Risk\"  Estimated rate of MI, Pulmonary Edema, V Fib, Cardiac Arrest, or Complete Heart Block 0.4%    Temp Range last 7 days: 96.4F to 98.9F, typically 97-98.3F  Results for Maddison Mu (MRN [id number]) as of 3/12/2021 14:03   Ref. Range 3/8/2021 04:00 3/9/2021 05:18 3/10/2021 05:28 3/11/2021 02:59 3/12/2021 03:50   WBC Latest Ref Range: 4.8 - 10.8 K/uL 6.0 5.9 7.2 7.7 7.4   Results for Maddison Mu (MRN [id number]) as of 3/12/2021 14:03   Ref. Range 3/8/2021 04:00 3/9/2021 05:18 3/10/2021 05:28 3/11/2021 02:59 3/12/2021 03:50   Neutrophils % Latest Ref Range: 50.0 - 65.0 % 85.8 (H) 85.6 (H) 79.0 (H) 75.9 (H) 66.4 (H)   Results for Maddison Mu (MRN [id number]) as of 3/12/2021 14:03   Ref.  Range 3/8/2021 04:00 3/9/2021 05:18 3/10/2021 05:28 3/11/2021 02:59 3/12/2021 03:50   Neutrophils Absolute Latest Ref Range: 1.5 - 7.5 K/uL 5.1 5.1 5.7 5.6 4.9     i do not believe that the patient has had persistant bacteremia on antibiotics, I do believe they are ready for their procedure pending a Hb level acceptable to their interventional cardiologist, repeat Hb level following transfusion this AM for Hb 6.3 is pending still, i anticipate they will be just over 7      Interval History [id number]:   Patient now s/p Cardiac Cath has extensive RCA disease with absent disease in left  Has been recommended for Pacer by cardio, they plan to place today  Has kept strict NPO  States that he has not moved bowels still but has stayed off the prune juice today as instructed    Scheduled Meds:   [Held by provider] enoxaparin  40 mg Subcutaneous Daily    chlorhexidine   Topical Once    amiodarone  200 mg Oral BID    cefUROXime  500 mg Oral BID    predniSONE 15 mg Oral Daily    Followed by   Alycia Mealing ON 3/15/2021] predniSONE  10 mg Oral Daily    Followed by   Alycia Mealing ON 3/18/2021] predniSONE  5 mg Oral Daily    miconazole   Topical 4x Daily    tamsulosin  0.4 mg Oral BID    atropine  1 mg Intravenous Once    [Held by provider] dilTIAZem  30 mg Oral 3 times per day    ipratropium-albuterol  1 ampule Inhalation Q4H    aspirin  81 mg Oral Daily    atorvastatin  20 mg Oral Daily    insulin lispro  0-6 Units Subcutaneous TID     insulin lispro  0-3 Units Subcutaneous Nightly    hydrALAZINE  25 mg Oral 3 times per day    metoprolol succinate  100 mg Oral Daily    NIFEdipine  60 mg Oral Daily    pantoprazole  40 mg Oral Daily    trospium  20 mg Oral Nightly    sodium chloride flush  10 mL Intravenous 2 times per day     Continuous Infusions:   sodium chloride      sodium chloride 125 mL/hr at 03/12/21 0610    sodium chloride 75 mL/hr at 03/10/21 1934    amiodarone 0.5 mg/min (03/09/21 0138)    dextrose       PRN Meds:sodium chloride, ondansetron, labetalol, calcium carbonate, guaiFENesin, sodium chloride flush, polyethylene glycol, acetaminophen **OR** acetaminophen, glucose, dextrose, glucagon (rDNA), dextrose        Objective:     Vitals reviewed. I/O last 3 completed shifts: In: 360 [P.O.:360]  Out: 2350 [Urine:1550; Emesis/NG output:800]  No intake/output data recorded. BP (!) 103/56   Pulse 70   Temp 97.1 °F (36.2 °C) (Temporal)   Resp 16   Ht 6' (1.829 m)   Wt 249 lb 14.4 oz (113.4 kg)   SpO2 93%   BMI 33.89 kg/m²     Physical Exam  Vitals signs reviewed. Constitutional:       General: He is not in acute distress. Appearance: Normal appearance. He is obese. He is not ill-appearing or toxic-appearing. Comments: Appears younger than stated age   HENT:      Head: Normocephalic and atraumatic. Nose: No congestion or rhinorrhea. Eyes:      General:         Right eye: No discharge. Left eye: No discharge.    Neck: Musculoskeletal: Neck supple. Comments: Trachea appears midline  Cardiovascular:      Rate and Rhythm: Normal rate and regular rhythm. Heart sounds: No murmur. No friction rub. No gallop. Pulmonary:      Effort: Pulmonary effort is normal. No respiratory distress. Breath sounds: No stridor. No wheezing, rhonchi or rales. Chest:      Chest wall: No tenderness. Abdominal:      General: Bowel sounds are normal.      Tenderness: There is no abdominal tenderness. There is no guarding or rebound. Musculoskeletal:      Comments: Has increased fat stores, no wasting of muscle stores   Skin:     General: Skin is warm. Comments: nondiaphoretic   Neurological:      Mental Status: He is alert. Cranial Nerves: No dysarthria. Motor: No tremor or seizure activity. Psychiatric:         Mood and Affect: Mood normal.         Behavior: Behavior normal.       Results for Dennise Vallejo (MRN [id number]) as of 3/12/2021 14:03   Ref.  Range 3/12/2021 03:50   WBC Latest Ref Range: 4.8 - 10.8 K/uL 7.4   RBC Latest Ref Range: 4.70 - 6.10 M/uL 2.23 (L)   Hemoglobin Quant Latest Ref Range: 14.0 - 18.0 g/dL 6.3 (L)   Hematocrit Latest Ref Range: 42.0 - 52.0 % 20.4 (L)   MCV Latest Ref Range: 80.0 - 94.0 fL 91.5   MCH Latest Ref Range: 27.0 - 31.0 pg 28.3   MCHC Latest Ref Range: 33.0 - 37.0 g/dL 30.9 (L)   MPV Latest Ref Range: 9.4 - 12.4 fL 13.0 (H)   RDW Latest Ref Range: 11.5 - 14.5 % 15.1 (H)   Platelet Count Latest Ref Range: 130 - 400 K/uL 137   Neutrophils % Latest Ref Range: 50.0 - 65.0 % 66.4 (H)   Lymphocyte % Latest Ref Range: 20.0 - 40.0 % 13.4 (L)   Monocytes % Latest Ref Range: 0.0 - 10.0 % 11.5 (H)   Eosinophils % Latest Ref Range: 0.0 - 5.0 % 0.0   Basophils % Latest Ref Range: 0.0 - 1.0 % 0.3   Neutrophils Absolute Latest Ref Range: 1.5 - 7.5 K/uL 4.9   Immature Granulocytes # Latest Units: K/uL 0.6   Lymphocytes Absolute Latest Ref Range: 1.1 - 4.5 K/uL 1.0 (L)   Monocytes Absolute Latest Ref Range: 0.00 - 0.90 K/uL 0.90   Eosinophils Absolute Latest Ref Range: 0.00 - 0.60 K/uL 0.00   Basophils Absolute Latest Ref Range: 0.00 - 0.20 K/uL 0.00   ABO Rh Unknown O POS   Antibody Screen Unknown NEG   PREPARE RBC (CROSSMATCH) Unknown Rpt   Unit Number Unknown [id number]     Results for Lisa Alexander (MRN [id number]) as of 3/12/2021 14:03   Ref. Range 3/11/2021 02:59   Sodium Latest Ref Range: 136 - 145 mmol/L 138   Potassium Latest Ref Range: 3.5 - 5.0 mmol/L 4.9   Chloride Latest Ref Range: 98 - 111 mmol/L 103   CO2 Latest Ref Range: 22 - 29 mmol/L 26   BUN Latest Ref Range: 8 - 23 mg/dL 54 (H)   Creatinine Latest Ref Range: 0.5 - 1.2 mg/dL 1.6 (H)   Anion Gap Latest Ref Range: 7 - 19 mmol/L 9   GFR Non- Latest Ref Range: >60  42 (A)   GFR  Latest Ref Range: >59  51 (L)   Glucose Latest Ref Range: 74 - 109 mg/dL 254 (H)   Calcium Latest Ref Range: 8.8 - 10.2 mg/dL 7.6 (L)   Total Protein Latest Ref Range: 6.6 - 8.7 g/dL 4.6 (L)       Narrative   XR CHEST PORTABLE 3/11/2021 3:40 AM   HISTORY:   Respiratory distress     Single view. COMPARISONS:  3/7/2021 chest radiography   FINDINGS:   Central venous catheter is appreciated at the left   subclavian/brachiocephalic vein junction. Bilateral perihilar infiltrates and pleural effusions persist, greater   on the right, likely unchanged considering differences in technique.       Impression   1. Stable 1 day appearance the chest.   Signed by Dr Kimmy Thompson on 3/11/2021 7:20 AM         Assessment:     Active Problems:    Recurrent pneumonia  Resolved Problems:    * No resolved hospital problems.  *        Plan:     Acute on chronic Anemia:  Transfused already, repeat Hb pending    COPD exacerbation likely secondary to recurrent versus nonresolving CAP with high risk for MDR pathogens: resolving on ABx  Multiple prior admissions for the same, required intubation 1/2021  CTA chest 3/6/2021: Bilateral pneumonia, no PE Broad-spectrum antibiotics: on Ceftin 500mg PO BID  Followed cultures  Molecular respiratory panel negative  Pulmonary following  Patient will require routine outpatient pulmonary/cardiology follow-up  Steroids, tapering course of PO ordered  NEBS  Goal sats 88-92%     Acute hypoxemic and hypercapnic respiratory failure: Secondary to above processes  BiPAP since weaned off - using QHS, will ask RN to have machine inspected by RT as per pt request  Currently on 2 L via NC, decreasing requirement  Follow ABG/CXR  Oximetry studies as warranted  Trilogy Ordered     NSVT:  Chronically elevated troponin: (resolved down to 0.02)  Multiple episodes 3/7/2021  Asymptomatic  Hemodynamically stable  Amiodarone 200mg PO BID  Metoprolol succinate 100mg PO QDay & nifedipine 60mg PO QDay  Continue telemetry monitoring  TTE 3/8/2021: EF 60-65%, LV and RV appear normal  Serial troponin 0.03 > 0.02 > 0.02  Denies chest pain  Current level lower than multiple prior levels  Cardiology following, recs event recorder upon discharge  Patient will require routine outpatient cardiology/pulmonary follow-up  for pacer today, Friday AM - [id number]    CKD 3A: Cr baseline 1.3 with GFR 53, Cr now at 1.6  Follow renal function/urine output  Avoid offending agents  Previously required RRT for 3.5 months    Hx tobacco dependence: (history of)  Counseled on continued cessation- done already    DM2: (no acute issues)  Lispro ISS  Hypoglycemic order set    DVT prophylaxis: Lovenox SQ, to be held on day of procedure      Critical Care Time greater than: 45 Minutes.

## 2021-03-12 NOTE — ACP (ADVANCE CARE PLANNING)
Advance Care Planning     Advance Care Planning Activator (Inpatient)  Conversation Note      Date of ACP Conversation: 3/12/2021    Conversation Conducted with: Patient    ACP Activator: 85Maricarmen Tripp  Patient completed the [address] Decision Maker:     Current Designated Health Care Decision Maker:     Primary Decision Maker: Charles Mason - [phone]      Care Preferences      Length of ACP Conversation in minutes:  30 minutes    Conversation Outcomes:  [x] New Advance Directive completed         Follow-up plan:     Discussion on Care Preferences    Electronically signed by 85Maricarmen Tripp [facility] on 3/12/2021 at 10:23 AM

## 2021-03-12 NOTE — PROGRESS NOTES
Pt refusing bipap at this time. Receiving O2 at 2L NC.     Electronically signed by Colin Zarate RN on 3/12/2021 at 3:22 AM

## 2021-03-13 ENCOUNTER — APPOINTMENT (OUTPATIENT)
Dept: GENERAL RADIOLOGY | Age: 81
DRG: 981 | End: 2021-03-13
Payer: MEDICARE

## 2021-03-13 LAB
BASE EXCESS ARTERIAL: -1 (ref -3–3)
BASOPHILS ABSOLUTE: 0 K/UL (ref 0–0.2)
BASOPHILS RELATIVE PERCENT: 0.3 % (ref 0–1)
CALCIUM IONIZED: 1.15 MMOL/L (ref 1.1–1.3)
CO2: 26 MEQ/L (ref 21–32)
EOSINOPHILS ABSOLUTE: 0 K/UL (ref 0–0.6)
EOSINOPHILS RELATIVE PERCENT: 0 % (ref 0–5)
GFR AFRICAN AMERICAN: 55
GFR NON-AFRICAN AMERICAN: 45
GLUCOSE BLD-MCNC: 173 MG/DL (ref 70–99)
GLUCOSE BLD-MCNC: 178 MG/DL (ref 70–99)
GLUCOSE BLD-MCNC: 189 MG/DL (ref 70–99)
GLUCOSE BLD-MCNC: 201 MG/DL (ref 70–99)
GLUCOSE BLD-MCNC: 256 MG/DL (ref 70–99)
HCT VFR BLD CALC: 24.1 % (ref 42–52)
HEMOGLOBIN: 7.6 G/DL (ref 14–18)
HEMOGLOBIN: 8.4 GM/DL (ref 12–18)
IMMATURE GRANULOCYTES #: 0.8 K/UL
LYMPHOCYTES ABSOLUTE: 1 K/UL (ref 1.1–4.5)
LYMPHOCYTES RELATIVE PERCENT: 10.5 % (ref 20–40)
MCH RBC QN AUTO: 29.6 PG (ref 27–31)
MCHC RBC AUTO-ENTMCNC: 31.5 G/DL (ref 33–37)
MCV RBC AUTO: 93.8 FL (ref 80–94)
MONOCYTES ABSOLUTE: 0.8 K/UL (ref 0–0.9)
MONOCYTES RELATIVE PERCENT: 8.6 % (ref 0–10)
NEUTROPHILS ABSOLUTE: 7 K/UL (ref 1.5–7.5)
NEUTROPHILS RELATIVE PERCENT: 72.6 % (ref 50–65)
O2 SAT, ARTERIAL: 73 % (ref 93–100)
PCO2 ARTERIAL: 54 MM HG (ref 35–48)
PDW BLD-RTO: 15.1 % (ref 11.5–14.5)
PERFORMED ON: ABNORMAL
PH ARTERIAL: 7.29 (ref 7.3–7.5)
PLATELET # BLD: 135 K/UL (ref 130–400)
PMV BLD AUTO: 12.8 FL (ref 9.4–12.4)
PO2 ARTERIAL: 44 MM HG (ref 83–108)
POC ANION GAP: 8
POC CHLORIDE: 106 MEQ/L (ref 99–110)
POC CREATININE: 1.5 MG/DL (ref 0.3–1.3)
POC HEMATOCRIT: 25 % (ref 37–52)
POC POTASSIUM: 4.7 MEQ/L (ref 3.5–5.1)
POC SAMPLE TYPE: ABNORMAL
POC SODIUM: 140 MEQ/L (ref 136–145)
RBC # BLD: 2.57 M/UL (ref 4.7–6.1)
TCO2 ARTERIAL: 28 MMOL/L
WBC # BLD: 9.7 K/UL (ref 4.8–10.8)

## 2021-03-13 PROCEDURE — 6370000000 HC RX 637 (ALT 250 FOR IP): Performed by: HOSPITALIST

## 2021-03-13 PROCEDURE — 33208 INSRT HEART PM ATRIAL & VENT: CPT | Performed by: INTERNAL MEDICINE

## 2021-03-13 PROCEDURE — 82435 ASSAY OF BLOOD CHLORIDE: CPT

## 2021-03-13 PROCEDURE — 99152 MOD SED SAME PHYS/QHP 5/>YRS: CPT | Performed by: INTERNAL MEDICINE

## 2021-03-13 PROCEDURE — 85025 COMPLETE CBC W/AUTO DIFF WBC: CPT

## 2021-03-13 PROCEDURE — 6360000002 HC RX W HCPCS: Performed by: INTERNAL MEDICINE

## 2021-03-13 PROCEDURE — 2500000003 HC RX 250 WO HCPCS

## 2021-03-13 PROCEDURE — 2580000003 HC RX 258: Performed by: INTERNAL MEDICINE

## 2021-03-13 PROCEDURE — 82330 ASSAY OF CALCIUM: CPT

## 2021-03-13 PROCEDURE — 2140000000 HC CCU INTERMEDIATE R&B

## 2021-03-13 PROCEDURE — 6370000000 HC RX 637 (ALT 250 FOR IP): Performed by: INTERNAL MEDICINE

## 2021-03-13 PROCEDURE — 82565 ASSAY OF CREATININE: CPT

## 2021-03-13 PROCEDURE — 93005 ELECTROCARDIOGRAM TRACING: CPT | Performed by: INTERNAL MEDICINE

## 2021-03-13 PROCEDURE — 93971 EXTREMITY STUDY: CPT

## 2021-03-13 PROCEDURE — 99152 MOD SED SAME PHYS/QHP 5/>YRS: CPT

## 2021-03-13 PROCEDURE — 99232 SBSQ HOSP IP/OBS MODERATE 35: CPT | Performed by: INTERNAL MEDICINE

## 2021-03-13 PROCEDURE — 82374 ASSAY BLOOD CARBON DIOXIDE: CPT

## 2021-03-13 PROCEDURE — 02HK3JZ INSERTION OF PACEMAKER LEAD INTO RIGHT VENTRICLE, PERCUTANEOUS APPROACH: ICD-10-PCS | Performed by: INTERNAL MEDICINE

## 2021-03-13 PROCEDURE — 82810 BLOOD GASES O2 SAT ONLY: CPT

## 2021-03-13 PROCEDURE — 0JH606Z INSERTION OF PACEMAKER, DUAL CHAMBER INTO CHEST SUBCUTANEOUS TISSUE AND FASCIA, OPEN APPROACH: ICD-10-PCS | Performed by: INTERNAL MEDICINE

## 2021-03-13 PROCEDURE — 2780000010 HC IMPLANT OTHER

## 2021-03-13 PROCEDURE — 2700000000 HC OXYGEN THERAPY PER DAY

## 2021-03-13 PROCEDURE — C1785 PMKR, DUAL, RATE-RESP: HCPCS

## 2021-03-13 PROCEDURE — 84295 ASSAY OF SERUM SODIUM: CPT

## 2021-03-13 PROCEDURE — 33208 INSRT HEART PM ATRIAL & VENT: CPT

## 2021-03-13 PROCEDURE — 84132 ASSAY OF SERUM POTASSIUM: CPT

## 2021-03-13 PROCEDURE — 82947 ASSAY GLUCOSE BLOOD QUANT: CPT

## 2021-03-13 PROCEDURE — 02H63JZ INSERTION OF PACEMAKER LEAD INTO RIGHT ATRIUM, PERCUTANEOUS APPROACH: ICD-10-PCS | Performed by: INTERNAL MEDICINE

## 2021-03-13 PROCEDURE — 99153 MOD SED SAME PHYS/QHP EA: CPT

## 2021-03-13 PROCEDURE — 71045 X-RAY EXAM CHEST 1 VIEW: CPT

## 2021-03-13 PROCEDURE — 82800 BLOOD PH: CPT

## 2021-03-13 PROCEDURE — C1898 LEAD, PMKR, OTHER THAN TRANS: HCPCS

## 2021-03-13 PROCEDURE — 3E0102A INTRODUCTION OF ANTI-INFECTIVE ENVELOPE INTO SUBCUTANEOUS TISSUE, OPEN APPROACH: ICD-10-PCS | Performed by: INTERNAL MEDICINE

## 2021-03-13 PROCEDURE — 94640 AIRWAY INHALATION TREATMENT: CPT

## 2021-03-13 PROCEDURE — C1769 GUIDE WIRE: HCPCS

## 2021-03-13 PROCEDURE — 6360000002 HC RX W HCPCS

## 2021-03-13 PROCEDURE — 36415 COLL VENOUS BLD VENIPUNCTURE: CPT

## 2021-03-13 PROCEDURE — 85014 HEMATOCRIT: CPT

## 2021-03-13 RX ORDER — SODIUM CHLORIDE 9 MG/ML
INJECTION, SOLUTION INTRAVENOUS CONTINUOUS
Status: DISCONTINUED | OUTPATIENT
Start: 2021-03-13 | End: 2021-03-15 | Stop reason: HOSPADM

## 2021-03-13 RX ORDER — SODIUM CHLORIDE 0.9 % (FLUSH) 0.9 %
10 SYRINGE (ML) INJECTION PRN
Status: DISCONTINUED | OUTPATIENT
Start: 2021-03-13 | End: 2021-03-15 | Stop reason: HOSPADM

## 2021-03-13 RX ORDER — ONDANSETRON 2 MG/ML
4 INJECTION INTRAMUSCULAR; INTRAVENOUS EVERY 6 HOURS PRN
Status: DISCONTINUED | OUTPATIENT
Start: 2021-03-13 | End: 2021-03-15 | Stop reason: HOSPADM

## 2021-03-13 RX ORDER — SODIUM CHLORIDE 0.9 % (FLUSH) 0.9 %
10 SYRINGE (ML) INJECTION EVERY 12 HOURS SCHEDULED
Status: DISCONTINUED | OUTPATIENT
Start: 2021-03-13 | End: 2021-03-15 | Stop reason: HOSPADM

## 2021-03-13 RX ADMIN — HYDRALAZINE HYDROCHLORIDE 25 MG: 25 TABLET, FILM COATED ORAL at 14:55

## 2021-03-13 RX ADMIN — IPRATROPIUM BROMIDE AND ALBUTEROL SULFATE 1 AMPULE: 2.5; .5 SOLUTION RESPIRATORY (INHALATION) at 19:04

## 2021-03-13 RX ADMIN — TAMSULOSIN HYDROCHLORIDE 0.4 MG: 0.4 CAPSULE ORAL at 14:56

## 2021-03-13 RX ADMIN — ASPIRIN 81 MG: 81 TABLET, FILM COATED ORAL at 14:56

## 2021-03-13 RX ADMIN — MICONAZOLE NITRATE: 2 POWDER TOPICAL at 14:59

## 2021-03-13 RX ADMIN — AMIODARONE HYDROCHLORIDE 200 MG: 200 TABLET ORAL at 14:55

## 2021-03-13 RX ADMIN — ATORVASTATIN CALCIUM 20 MG: 20 TABLET, FILM COATED ORAL at 14:56

## 2021-03-13 RX ADMIN — TAMSULOSIN HYDROCHLORIDE 0.4 MG: 0.4 CAPSULE ORAL at 20:21

## 2021-03-13 RX ADMIN — Medication 2000 MG: at 14:57

## 2021-03-13 RX ADMIN — TROSPIUM CHLORIDE 20 MG: 20 TABLET, FILM COATED ORAL at 20:20

## 2021-03-13 RX ADMIN — IPRATROPIUM BROMIDE AND ALBUTEROL SULFATE 1 AMPULE: 2.5; .5 SOLUTION RESPIRATORY (INHALATION) at 14:14

## 2021-03-13 RX ADMIN — SODIUM CHLORIDE, PRESERVATIVE FREE 10 ML: 5 INJECTION INTRAVENOUS at 14:56

## 2021-03-13 RX ADMIN — PANTOPRAZOLE SODIUM 40 MG: 40 TABLET, DELAYED RELEASE ORAL at 15:02

## 2021-03-13 RX ADMIN — AMIODARONE HYDROCHLORIDE 200 MG: 200 TABLET ORAL at 20:20

## 2021-03-13 RX ADMIN — SODIUM CHLORIDE: 9 INJECTION, SOLUTION INTRAVENOUS at 14:56

## 2021-03-13 RX ADMIN — METOPROLOL SUCCINATE 100 MG: 50 TABLET, EXTENDED RELEASE ORAL at 14:56

## 2021-03-13 RX ADMIN — IPRATROPIUM BROMIDE AND ALBUTEROL SULFATE 1 AMPULE: 2.5; .5 SOLUTION RESPIRATORY (INHALATION) at 22:05

## 2021-03-13 RX ADMIN — NIFEDIPINE 60 MG: 60 TABLET, FILM COATED, EXTENDED RELEASE ORAL at 14:56

## 2021-03-13 RX ADMIN — Medication 2000 MG: at 23:58

## 2021-03-13 RX ADMIN — IPRATROPIUM BROMIDE AND ALBUTEROL SULFATE 1 AMPULE: 2.5; .5 SOLUTION RESPIRATORY (INHALATION) at 07:23

## 2021-03-13 RX ADMIN — INSULIN LISPRO 3 UNITS: 100 INJECTION, SOLUTION INTRAVENOUS; SUBCUTANEOUS at 18:15

## 2021-03-13 RX ADMIN — PREDNISONE 15 MG: 10 TABLET ORAL at 15:03

## 2021-03-13 RX ADMIN — HYDRALAZINE HYDROCHLORIDE 25 MG: 25 TABLET, FILM COATED ORAL at 20:21

## 2021-03-13 ASSESSMENT — ENCOUNTER SYMPTOMS
BACK PAIN: 0
VOMITING: 0
VOICE CHANGE: 0
NAUSEA: 0
SHORTNESS OF BREATH: 1
DIARRHEA: 0
CONSTIPATION: 0
COLOR CHANGE: 0

## 2021-03-13 ASSESSMENT — PAIN SCALES - GENERAL: PAINLEVEL_OUTOF10: 0

## 2021-03-13 NOTE — PROGRESS NOTES
Preliminary report    Successful access to left subclavian vein for pacemaker placement    Pending final report

## 2021-03-13 NOTE — PROGRESS NOTES
Pulmonary and Critical Care Progress note. [address] [place] [address]    MRN# [id number]    Acct# [de-identified]  3/13/2021   11:00AM CST    Referring Provider:Igor Rivas DO      Chief Complaint: Shortness of breath. HPI: over the past 24 hours he continues to feel better. Shortness is breath is improved. Pacemaker placement today.     Medications  sodium chloride flush, 10 mL, Intravenous, 2 times per day    ceFAZolin (ANCEF) IVPB, 2,000 mg, Intravenous, Q8H    [Held by provider] enoxaparin, 40 mg, Subcutaneous, Daily    chlorhexidine, , Topical, Once    amiodarone, 200 mg, Oral, BID    cefUROXime, 500 mg, Oral, BID    [COMPLETED] predniSONE, 20 mg, Oral, Daily **FOLLOWED BY** predniSONE, 15 mg, Oral, Daily **FOLLOWED BY** [START ON 3/15/2021] predniSONE, 10 mg, Oral, Daily **FOLLOWED BY** [START ON 3/18/2021] predniSONE, 5 mg, Oral, Daily    miconazole, , Topical, 4x Daily    tamsulosin, 0.4 mg, Oral, BID    atropine, 1 mg, Intravenous, Once    [Held by provider] dilTIAZem, 30 mg, Oral, 3 times per day    ipratropium-albuterol, 1 ampule, Inhalation, Q4H    aspirin, 81 mg, Oral, Daily    atorvastatin, 20 mg, Oral, Daily    insulin lispro, 0-6 Units, Subcutaneous, TID WC    insulin lispro, 0-3 Units, Subcutaneous, Nightly    hydrALAZINE, 25 mg, Oral, 3 times per day    metoprolol succinate, 100 mg, Oral, Daily    NIFEdipine, 60 mg, Oral, Daily    pantoprazole, 40 mg, Oral, Daily    trospium, 20 mg, Oral, Nightly    sodium chloride flush, 10 mL, Intravenous, 2 times per day     Review of Systems:  RESPIRATORY:  positive for  dyspnea  CARDIOVASCULAR:  positive for  dyspnea  GASTROINTESTINAL:  negative  GENITOURINARY:  negative    Physical Exam:  /68   Pulse 67   Temp 96.5 °F (35.8 °C) (Temporal)   Resp 20   Ht 6' (1.829 m)   Wt 255 lb (115.7 kg)   SpO2 96%   BMI 34.58 kg/m²     Intake/Output Summary (Last 24 hours) at 3/13/2021 1404  Last data filed at 3/13/2021 0430  Gross per 24 hour   Intake 200 ml   Output 1200 ml   Net -1000 ml       General Appearance: alert and oriented to person, place and time, well-developed and well-nourished, in no acute distress  ENT: tympanic membrane, external ear and ear canal normal bilaterally, oropharynx clear and moist with normal mucous membranes  Pulmonary/Chest: diminished bilaterally, no rubs or tenderness or dullness to percussion. Cardiovascular: normal rate, normal S1 and S2, no gallops, intact distal pulses and no carotid bruits  Abdomen: soft, non-tender, non-distended, normal bowel sounds, no masses or organomegaly  Extremities: no cyanosis, no clubbing and 1 + edema-  bilateral   Neurologic: no focal findings. Recent Labs     03/11/21  0259 03/12/21  0350 03/12/21  1350 03/13/21  0240 03/13/21  1040   WBC 7.7 7.4  --  9.7  --    RBC 2.50* 2.23*  --  2.57*  --    HGB 7.1* 6.3* 7.7* 7.6* 8.4*   HCT 22.7* 20.4* 24.6* 24.1*  --     137  --  135  --    MCV 90.8 91.5  --  93.8  --    MCH 28.4 28.3  --  29.6  --    MCHC 31.3* 30.9*  --  31.5*  --    RDW 14.7* 15.1*  --  15.1*  --       Recent Labs     03/11/21  0259 03/13/21  1040     --    K 4.9  --      --    CO2 26 26   BUN 54*  --    CREATININE 1.6* 1.5*   CALCIUM 7.6*  --    GLUCOSE 254*  --         Recent Labs     03/11/21 0259   AST 18   ALT 26   ALKPHOS 68   BILITOT 0.3   CALCIUM 7.6*     No results for input(s): BC, LABGRAM, CULTRESP, BFCX in the last 72 hours. Radiograph: Xr Chest Portable    Result Date: 3/7/2021  Impression: 1. CVL tip overlies the SVC. 2.  No significant change in bilateral interstitial and airspace opacity, greatest in the RIGHT lower lung. Signed by Dr Kenyetta Cleveland on 3/7/2021 1:36 PM    Xr Chest Portable    Result Date: 3/6/2021  Impression: Slight increase in bilateral RIGHT greater than LEFT middle and lower lung zone airspace opacity.  Signed by Dr Kenyetta Cleveland on 3/6/2021 1:14 PM    Cta Pulmonary W Contrast    Result Date: 3/6/2021  Impression: 1. No evidence of pulmonary embolus. 2.  Slight progression of bilateral RIGHT greater than LEFT consolidation, suspicious for pneumonia. 3.  Small bilateral pleural effusions and interstitial pulmonary edema. Signed by Dr Demetria Sanchez on 3/6/2021 1:56 PM       My radiograph interpretation/independent review of imaging: Chest x-ray shows improved findings to my eye. Problem list generated by [facility] Problems           Last Modified POA    Nonsustained ventricular tachycardia ([address] 3/13/2021 Yes    Recurrent pneumonia 3/6/2021 Yes           Pulmonary Assessment/Plan:     1. Acute on chronic hypoxic hypercapnic respiratory failure, continue current management with oxygen supplementation and noninvasive ventilation while sleeping. He would benefit from a trilogy machine. He did not tolerate BiPAP. Continues to be hypercapnic on BiPAP. 2. No clinical evidence of infectious pneumonia. On oral antibiotics. Will stop antibiotics. 3. Anemia ? Due to blood draws. No evidence of active bleeding. 4. Acute on chronic congestive heart failure. Cath findings as documented per cardiology. pacemaker for sinus pause placed. 5. Dyspnea multifactorial possibly secondary to the above. Supportive care. Improved. 6. Possible underlying obstructive sleep apnea. Trilogy on discharge as discussed above. 7. DVT prophylaxis. 8. Will see as needed. Follow up as outpatient in a couple of weeks after DC.            Electronically signed by Shana Ramírez MD on 03/13/21 at 2:04 PM

## 2021-03-13 NOTE — PROGRESS NOTES
Hospitalist Progress Note    Patient:  Candido Nam  YOB: 1940  Date of Service: 3/13/2021  MRN: [id number]   Acct: [de-identified]   Primary Care Physician: Aneudy Angelo MD  Advance Directive: Full Code  Admit Date: 3/6/2021       Hospital Day: 7  Referring Provider: Nicol Rodriguez DO    Patient Seen, Chart, Consults, Notes, Labs, Radiology studies reviewed. Subjective:  Candido Nam is a 79 y.o. male  whom we are following for symptomatic bradycardia, hypercapnic respiratory failure, CKD stage III. He underwent pacemaker implantation today. He is doing well. He states that his breathing is better. He also is feeling better.     Allergies:  Eliquis [apixaban] and Promethazine hcl    Medicines:  Current Facility-Administered Medications   Medication Dose Route Frequency Provider Last Rate Last Admin    0.9 % sodium chloride infusion   Intravenous Continuous Claudia Escalante MD 75 mL/hr at 03/13/21 1457 Rate Verify at 03/13/21 1457    sodium chloride flush 0.9 % injection 10 mL  10 mL Intravenous 2 times per day Claudia Escalante MD        sodium chloride flush 0.9 % injection 10 mL  10 mL Intravenous PRN Claudia Escalante MD        ceFAZolin (ANCEF) 2000 mg in 0.9% sodium chloride 50 mL IVPB  2,000 mg Intravenous Jackeline Jacobs MD   Stopped at 03/13/21 1644    ondansetron (ZOFRAN) injection 4 mg  4 mg Intravenous Q6H PRN Claudia Escalante MD        0.9 % sodium chloride infusion   Intravenous PRN Billie Joyner MD        [Held by provider] enoxaparin (LOVENOX) injection 40 mg  40 mg Subcutaneous Daily Guillermo Dumont MD        0.9 % sodium chloride infusion   Intravenous Continuous Claudia Escalante  mL/hr at 03/12/21 0610 New Bag at 03/12/21 0610    chlorhexidine (HIBICLENS) 4 % liquid   Topical Once Claudia Escalante MD        0.9 % sodium chloride infusion   Intravenous Continuous Claudia Escalante MD 75 mL/hr at 03/13/21 13[address] [facility] [address] at 03/13/21 1456    ondansetron (ZOFRAN) injection 4 mg  4 mg Intravenous Q6H PRN Vani Ansari MD        amiodarone (CORDARONE) tablet 200 mg  200 mg Oral BID Vani Ansari MD   200 mg at 03/13/21 1455    predniSONE (DELTASONE) tablet 15 mg  15 mg Oral Daily Jet Holt MD   15 mg at 03/13/21 1503    Followed by   Kristen Johnson ON 3/15/2021] predniSONE (DELTASONE) tablet 10 mg  10 mg Oral Daily Jet Holt MD        Followed by   Kristen Johnson ON 3/18/2021] predniSONE (DELTASONE) tablet 5 mg  5 mg Oral Daily Jet Holt MD        miconazole (MICOTIN) 2 % powder   Topical 4x Daily Janie Jovel MD   Given at 03/13/21 1459    tamsulosin (FLOMAX) capsule 0.4 mg  0.4 mg Oral BID Fernando Mccarthy MD   0.4 mg at 03/13/21 1456    amiodarone (CORDARONE) 450 mg in dextrose 5 % 250 mL infusion  0.5 mg/min Intravenous Continuous Janie Jovel MD 16.7 mL/hr at 03/09/21 0138 0.5 mg/min at 03/09/21 0138    atropine injection 1 mg  1 mg Intravenous Once Gertrude Bernheim, MD   Stopped at 03/07/21 1519    [Held by provider] dilTIAZem (CARDIZEM) tablet 30 mg  30 mg Oral 3 times per day Gertrude Bernheim, MD   Stopped at 03/07/21 2043    labetalol (NORMODYNE;TRANDATE) injection 20 mg  20 mg Intravenous Q4H PRN Janie Jovel MD        ipratropium-albuterol (DUONEB) nebulizer solution 1 ampule  1 ampule Inhalation Q4H Janie Jovel MD   1 ampule at 03/13/21 1414    aspirin EC tablet 81 mg  81 mg Oral Daily Janie Jovel MD   81 mg at 03/13/21 1456    atorvastatin (LIPITOR) tablet 20 mg  20 mg Oral Daily Janie Jovel MD   20 mg at 03/13/21 1456    calcium carbonate (TUMS) chewable tablet 500 mg  500 mg Oral TID PRN Janie Jovel MD   500 mg at 03/11/21 2120    insulin lispro (HUMALOG) injection vial 0-6 Units  0-6 Units Subcutaneous TID  Janie Jovel MD   2 Units at 03/12/21 1842    insulin lispro (HUMALOG) injection vial 0-3 Units  0-3 Units Subcutaneous Nightly Frandy Teran MD   1 Units at 03/12/21 2132    guaiFENesin (ROBITUSSIN) 100 MG/5ML liquid 200 mg  200 mg Oral Q4H PRN Frandy Teran MD        hydrALAZINE (APRESOLINE) tablet 25 mg  25 mg Oral 3 times per day Frandy eTran MD   25 mg at 03/13/21 1455    metoprolol succinate (TOPROL XL) extended release tablet 100 mg  100 mg Oral Daily Frandy Teran MD   100 mg at 03/13/21 1456    NIFEdipine (PROCARDIA XL) extended release tablet 60 mg  60 mg Oral Daily Frandy Teran MD   60 mg at 03/13/21 1456    pantoprazole (PROTONIX) tablet 40 mg  40 mg Oral Daily Frandy Teran MD   40 mg at 03/13/21 1502    trospium (SANCTURA) tablet 20 mg  20 mg Oral Nightly Frandy Teran MD   20 mg at 03/12/21 2119    sodium chloride flush 0.9 % injection 10 mL  10 mL Intravenous 2 times per day Frandy Teran MD   10 mL at 03/13/21 1456    sodium chloride flush 0.9 % injection 10 mL  10 mL Intravenous PRN Frandy Teran MD        polyethylene glycol [facility]) packet 17 g  17 g Oral Daily PRN Frandy Teran MD   17 g at 03/11/21 1038    acetaminophen (TYLENOL) tablet 650 mg  650 mg Oral Q6H PRN Frandy Teran MD        Or    acetaminophen (TYLENOL) suppository 650 mg  650 mg Rectal Q6H PRN Frandy Teran MD        glucose (GLUTOSE) 40 % oral gel 15 g  15 g Oral PRN Frandy Teran MD        dextrose 50 % IV solution  12.5 g Intravenous PRN Frandy Teran MD        glucagon (rDNA) injection 1 mg  1 mg Intramuscular PRN Frandy Teran MD        dextrose 5 % solution  100 mL/hr Intravenous PRN Frandy Teran MD           Past Medical History:  Past Medical History:   Diagnosis Date    Acute liver failure without hepatic coma 10/23/2018    Back pain     \"with tired legs as a result\"    Bladder cancer ([facility] [address] 12/19/2018    Blood circulation, collateral     Carotid arterial disease ([facility] [address]     recent surgery    CKD (chronic kidney disease), 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/2018    COPD with acute lower respiratory infection ([facility] [address] 2/24/2021    GERD (gastroesophageal reflux disease)     Hyperlipidemia     Hypertension     Hypertension     Palliative care patient 10/23/2018    Pneumonia due to infectious organism 11/06/2018    Primary osteoarthritis of left knee 10/14/2018    P[facility]peripheral vascular disease) [facility]     Tremor     Tremor on Right side x 1-2 weeks per stepdaughter    Type 2 diabetes mellitus with complication, without long-term current use of insulin ([facility] [address] 1/21/2021       Past Surgical History:  Past Surgical History:   Procedure Laterality Date    BACK SURGERY      COLONOSCOPY  2007?  CYSTOSCOPY Bilateral 12/19/2018    CYSTOSCOPY, BIOSPY FULGURATION OF BLADDER TUMOR POSSIBLE TURBT, RETROGRADE PYELOGRAM performed by Dave Prakash MD at [facility] COLONOSCOPY FLX DX W/COLLJ SPEC WHEN PFRMD N/A 9/11/2017    Dr Jaron Chavez internal hemorrhoids, diverticular disease-HP-No recall (age)   Elias Hargrove ND REVISE MEDIAN N/CARPAL TUNNEL SURG Left 7/18/2018    OPEN CARPAL TUNNEL RELEASE performed by Mauricio Person MD at [address] Left 8/28/2018    LEFT CAROTID ENDARTERECTOMY WITH VEIN PATCH ANGIOPLASTY AND COMPLETION ANGIOGRAM performed by Jessica Hernandez MD at [place] [id number] Left 10/15/2018    LEFT COMPLEX TOTAL KNEE ARTHROPLASTY performed by Mauricio Person MD at [facility] VASCULAR SURGERY  04/21/2015    Maisha BESS Ultrasound guided access of left common femoral artery. Aortogram.Diagnostic right lower extremity arteriogram.Radiologic supervision and interpretation.  VASCULAR SURGERY  01/13/2015    Maisha Vincent M.D Atherectomy,angioplasty,and stenting of left superficial femoral artery.  VASCULAR SURGERY  03/11/2014    Maisha Vincent M.D. Ultrasound-guided access of right common femoral artery. Aortogram.Left lower extremity arteriogram.Atherectomy and angioplasty of left superficial femoral artery. Radiologic supervision and interpretation.  VASCULAR SURGERY  2013    Andrey BESS Aortogram.Multistation arteriogram right lower extremity. Laser atherectomy and angioplasty of right superficial femoral artery. Selective catheterization of right tibioperoneal trunk. Angioplasty of peroneal artery and tibioperoneal trunk.  VASCULAR SURGERY  10/30/2018    [facility]. Ultrasound guided cannulation of right internal vein. Placement of right internal jugular vein tunneled dialysis catheter bard equistream xk 23cm tip to cuff    VASCULAR SURGERY  2018    [facility]. Removal of tunneled dilaysis catheter right internal jugular vein. Family History  Family History   Problem Relation Age of Onset    Colon Cancer Father     Diabetes Brother     Colon Polyps Neg Hx     Liver Cancer Neg Hx     Liver Disease Neg Hx     Esophageal Cancer Neg Hx     Rectal Cancer Neg Hx     Stomach Cancer Neg Hx        Social History  Social History     Socioeconomic History    Marital status:      Spouse name: Not on file    Number of children: Not on file    Years of education: Not on file    Highest education level: Not on file   Occupational History    Not on file   Social Needs    Financial resource strain: Not on file    Food insecurity     Worry: Not on file     Inability: Not on file    Transportation needs     Medical: Not on file     Non-medical: Not on file   Tobacco Use    Smoking status: Former Smoker     Quit date: 6/3/2003     Years since quittin.7    Smokeless tobacco: Never Used   Substance and Sexual Activity    Alcohol use:  Yes     Alcohol/week: 12.0 standard drinks     Types: 12 Glasses of wine per week     Comment: 2 glasses of wine every night    Drug use: No    Sexual activity: Yes     Partners: Female   Lifestyle    Physical activity     Days per week: Not on file     Minutes per session: Not on file    Stress: Not on file   Relationships    Social connections     Talks on phone: Not on file     Gets together: Not on file     Attends Voodoo service: Not on file     Active member of club or organization: Not on file     Attends meetings of clubs or organizations: Not on file     Relationship status: Not on file    Intimate partner violence     Fear of current or ex partner: Not on file     Emotionally abused: Not on file     Physically abused: Not on file     Forced sexual activity: Not on file   Other Topics Concern    Not on file   Social History Narrative    Not on file         Review of Systems:    Review of Systems   Constitutional: Negative for activity change and fever. HENT: Negative for congestion and voice change. Eyes: Negative for visual disturbance. Respiratory: Positive for shortness of breath. Cardiovascular: Negative for chest pain and leg swelling. Gastrointestinal: Negative for constipation, diarrhea, nausea and vomiting. Endocrine: Negative for polyuria. Genitourinary: Negative for difficulty urinating and dysuria. Musculoskeletal: Negative for back pain and neck pain. Skin: Negative for color change. Allergic/Immunologic: Negative for immunocompromised state. Neurological: Negative for dizziness and light-headedness. Psychiatric/Behavioral: The patient is not nervous/anxious. Objective:  Blood pressure 118/64, pulse 66, temperature 97.5 °F (36.4 °C), temperature source Temporal, resp. rate 16, height 6' (1.829 m), weight 255 lb (115.7 kg), SpO2 94 %. Intake/Output Summary (Last 24 hours) at 3/13/2021 1655  Last data filed at 3/13/2021 1439  Gross per 24 hour   Intake 440 ml   Output 1100 ml   Net -660 ml       Physical Exam  Vitals signs and nursing note reviewed. Constitutional:       Appearance: He is ill-appearing. HENT:      Head: Normocephalic and atraumatic.       Right Ear: External ear normal.      Left Ear: External ear normal.      Nose: Nose normal.      Mouth/Throat:      Mouth: Mucous membranes are moist.   Eyes: Conjunctiva/sclera: Conjunctivae normal.      Pupils: Pupils are equal, round, and reactive to light. Neck:      Musculoskeletal: Neck supple. No neck rigidity. Cardiovascular:      Rate and Rhythm: Normal rate and regular rhythm. Heart sounds: Normal heart sounds. Pulmonary:      Effort: Pulmonary effort is normal.      Breath sounds: Normal breath sounds. Abdominal:      General: Abdomen is flat. Palpations: Abdomen is soft. Musculoskeletal:         General: No swelling. Skin:     General: Skin is warm and dry. Neurological:      Mental Status: He is oriented to person, place, and time. Psychiatric:         Mood and Affect: Mood normal.         Thought Content: Thought content normal.         Labs:  BMP:   Recent Labs     03/11/21  0259 03/13/21  1040     --    K 4.9  --      --    CO2 26 26   BUN 54*  --    CREATININE 1.6* 1.5*   CALCIUM 7.6*  --      CBC:   Recent Labs     03/11/21 0259 03/12/21  0350 03/12/21  1350 03/13/21  0240 03/13/21  1040   WBC 7.7 7.4  --  9.7  --    HGB 7.1* 6.3* 7.7* 7.6* 8.4*   HCT 22.7* 20.4* 24.6* 24.1*  --    MCV 90.8 91.5  --  93.8  --     137  --  135  --      LIVER PROFILE:   Recent Labs     03/11/21 0259   AST 18   ALT 26   BILITOT 0.3   ALKPHOS 68     PT/INR: No results for input(s): PROTIME, INR in the last 72 hours. APTT: No results for input(s): APTT in the last 72 hours. BNP:  No results for input(s): BNP in the last 72 hours. Ionized Calcium:No results for input(s): IONCA in the last 72 hours. Magnesium:No results for input(s): MG in the last 72 hours. Phosphorus:No results for input(s): PHOS in the last 72 hours. HgbA1C: No results for input(s): LABA1C in the last 72 hours. Hepatic:   Recent Labs     03/11/21 0259   ALKPHOS 68   ALT 26   AST 18   PROT 4.6*   BILITOT 0.3   LABALBU 2.8*     Lactic Acid: No results for input(s): LACTA in the last 72 hours.   Troponin: No results for input(s): CKTOTAL, CKMB, TROPONINT in the last 72 hours. ABGs: No results for input(s): PH, PCO2, PO2, HCO3, O2SAT in the last 72 hours. CRP:  No results for input(s): CRP in the last 72 hours. Sed Rate:  No results for input(s): SEDRATE in the last 72 hours. Cultures:   No results for input(s): CULTURE in the last 72 hours. No results for input(s): BC, Rilla Smiling in the last 72 hours. No results for input(s): CXSURG in the last 72 hours. Radiology reports as per the Radiologist  Radiology: Xr Chest Portable    Result Date: 3/7/2021  Exam: XR CHEST PORTABLE - 3/7/2021 12:14 PM Indication: Central line placement Comparison: 3/6/2021 Findings: CVL tip overlies the SVC. Cardiac silhouette is stable. No significant change in bilateral interstitial and airspace opacity, greatest in the RIGHT lower lobe. No visible pneumothorax. No acute osseous finding. Impression: 1. CVL tip overlies the SVC. 2.  No significant change in bilateral interstitial and airspace opacity, greatest in the RIGHT lower lung. Signed by Dr Danitza Oliva on 3/7/2021 1:36 PM    Xr Chest Portable    Result Date: 3/6/2021  Exam: XR CHEST PORTABLE - 3/6/2021 11:52 AM Indication: Shortness of air Comparison: 2/12/2021 Findings: Persistent and slightly increased RIGHT greater than LEFT middle and lower lung zone airspace opacity. No large pleural effusion. No pneumothorax. Cardiac silhouette is stable. No acute osseous finding. Impression: Slight increase in bilateral RIGHT greater than LEFT middle and lower lung zone airspace opacity. Signed by Dr Danitza Oliva on 3/6/2021 1:14 PM    Cta Pulmonary W Contrast    Result Date: 3/6/2021  Exam: CT chest angiography with 3D MIP images with IV contrast - 3/6/2021 12:33 PM Indication: Worsening respiratory function, history of pneumonia Comparison: 2/13/2021 DLP: 927 mGy cm.  In order to have a CT radiation dose as low as reasonably achievable, Automated Exposure Control was utilized for adjustment of the mA and/or KV according to patient size. Findings: No evidence of pulmonary embolus. Main pulmonary artery is upper limits of normal in caliber. Thoracic aorta is nonaneurysmal. Atherosclerotic change in the aortic arch and coronary arteries noted. No pericardial effusion. Central airways are clear. Interlobular septal thickening. Slight interval worsening of multifocal bilateral consolidation and groundglass opacity. Disease burden is greatest in the RIGHT upper lobe. Small RIGHT greater than LEFT pleural effusions with adjacent atelectasis. No pneumothorax. Mild mediastinal lymphadenopathy similar to prior and likely reactive. 1.3 cm RIGHT thyroid nodule. No acute chest wall soft tissue abnormality. No acute osseous finding. Thoracic spine is scoliotic curvature and degenerative change. No acute osseous finding. Impression: 1. No evidence of pulmonary embolus. 2.  Slight progression of bilateral RIGHT greater than LEFT consolidation, suspicious for pneumonia. 3.  Small bilateral pleural effusions and interstitial pulmonary edema. Signed by Dr Danitza Oliva on 3/6/2021 1:56 PM       Assessment     Symptomatic bradycardia. Status post pacemaker insertion. Acute on chronic combined hypoxemic and hypercapnic respiratory failure. Continue undergoing medical management.       Kary Petty DO

## 2021-03-13 NOTE — PROGRESS NOTES
Comprehensive Nutrition Assessment    Type and Reason for Visit:  Initial, RD Nutrition Re-Screen/LOS    Nutrition Recommendations/Plan: continue current POC    Nutrition Assessment:  Pt is well nourished and not at risk for nutritiona compromise at this time. PO intake remains %  Aware had Pacemaker placed this a.m. Malnutrition Assessment:  Malnutrition Status:  No malnutrition    Context:  Acute Illness     Findings of the 6 clinical characteristics of malnutrition:  Energy Intake:  No significant decrease in energy intake  Weight Loss:  No significant weight loss     Body Fat Loss:  No significant body fat loss     Muscle Mass Loss:  No significant muscle mass loss    Fluid Accumulation:  7 - Moderate to Severe Extremities   Strength:  Not Performed    Nutrition Related Findings:  well nourished      Wounds:  Skin Tears       Current Nutrition Therapies:    DIET CARB CONTROL; Anthropometric Measures:  · Height: 6' (182.9 cm)  · Current Body Weight: 255 lb (115.7 kg)   · Admission Body Weight: 230 lb (104.3 kg)    · Usual Body Weight: 229 lb (103.9 kg)(12/2020)     · Ideal Body Weight: 178 lbs; % Ideal Body Weight 143.3 %   · BMI: 34.6  · Adjusted Body Weight:  ; No Adjustment   · BMI Categories: Obese Class 1 (BMI 30.0-34. 9)       Nutrition Diagnosis:   · Overweight/Obese related to excessive energy intake, cardiac dysfunction as evidenced by BMI, localized or generalized fluid accumulation      Nutrition Interventions:   Food and/or Nutrient Delivery:  Continue Current Diet  Nutrition Education/Counseling:  No recommendation at this time   Coordination of Nutrition Care:  Continue to monitor while inpatient    Goals:  po intake 75% or greater.   Weight stable or decrease 1-5# per week       Nutrition Monitoring and Evaluation:   Behavioral-Environmental Outcomes:  None Identified   Food/Nutrient Intake Outcomes:  Food and Nutrient Intake  Physical Signs/Symptoms Outcomes:  Biochemical Data, Skin, Weight, Fluid Status or Edema     Discharge Planning:    Continue current diet     Electronically signed by Cr Pryor, MS, RD, LD on 3/13/21 at 12:43 PM CST    Contact: [phone]

## 2021-03-13 NOTE — PROGRESS NOTES
PT REFUSED TO WEAR BIPAP DUE TO RT WILL NOT TURN ALARMS OFF ON MACHINE.  EXPLAINED TO PT WE WERE NOT ALLOWED TO. RT HURT CAME TO SPEAK WITH PT

## 2021-03-13 NOTE — PROCEDURES
Rochelle Gonzalez is a 79 y.o. male patient. Central Line    Date/Time: 3/7/2021 12:45 PM  Performed by: Myrna Tesfaye MD  Authorized by: Myrna Tesfaye MD   Consent: Written consent obtained. Risks and benefits: risks, benefits and alternatives were discussed  Consent given by: patient  Patient understanding: patient states understanding of the procedure being performed  Patient consent: the patient's understanding of the procedure matches consent given  Procedure consent: procedure consent matches procedure scheduled  Patient identity confirmed: verbally with patient, arm band and hospital-assigned identification number  Time out: Immediately prior to procedure a \"time out\" was called to verify the correct patient, procedure, equipment, support staff and site/side marked as required.   Indications: vascular access    Anesthesia:  Local Anesthetic: lidocaine 1% without epinephrine  Preparation: skin prepped with ChloraPrep  Skin prep agent dried: skin prep agent completely dried prior to procedure  Sterile barriers: all five maximum sterile barriers used - cap, mask, sterile gown, sterile gloves, and large sterile sheet  Hand hygiene: hand hygiene performed prior to central venous catheter insertion  Location details: right subclavian  Patient position: Trendelenburg  Catheter type: triple lumen  Catheter size: 7 Fr  Pre-procedure: landmarks identified  Number of attempts: 1  Successful placement: yes  Post-procedure: line sutured and dressing applied  Assessment: blood return through all ports,  free fluid flow,  placement verified by x-ray and no pneumothorax on x-ray  Patient tolerance: patient tolerated the procedure well with no immediate complications  Comments: EBL: 2 cc        Myrna Tesfaye MD  3/7/2021
diaphragmatic pacing occurred at 10 V and 1.5 ms. The active fixation mechanisms were extended. Next, the leads were then sutured utilizing 2-0 ethibond sutures. Lead measurements were then rechecked. After having assured adequate hemostasis the leads were connected to the pulse generator and the pulse generator and leads were placed in the pocket. The pocket was copiously irrigated utilizing antibiotic solution. The pacemaker and leads system were visualized under fluoroscopy. Appropriate redundancy/slack in the leads were noted. The pins of the leads were beyond the set screws. Hemostasis was reverified. The pocket was then closed using 2-0 Vicryl for the deep layer and 3-0 Vicryl for the middle layer. Surgical staples were then applied to the skin and sterile dressing was applied. At the end of the procedure instrument, needle, and sponge counts were correct. An arm immobilizer was applied at this point. An independent trained observer administered medications under my direction. The patient was continuously monitored with respect to level of consciousness, and vital signs/physiologic status throughout the case. For further details regarding specific medications and doses please refer to Cath Lab procedural notes.     Anesthesia start time:1016  Anesthesia stop time:1129      Pacemaker Data:     Atrial lead   [employer] [address]   6029QT/41   serial #   [id number]  Volt    0.5 V    PW    0.4 ms    Current   NA   ma       Impedance:   340   ohms        Slew rate:   NA   V/sec  P wave:   3.7 mv    Right Ventricular lead    [employer]  Model   [id number]   serial #   [id number]  Volt    0.5    V     PW    0.4 ms     Current   NA   ma    I  Impedance:   590   ohms       Slew rate:   NA   V/sec  R wave:   10.3 mv    Generator  [employer]  Model   [id number]  Serial   [id number]      Estimated Blood Loss:  Minimal         Complications:  None; patient

## 2021-03-13 NOTE — PROGRESS NOTES
Cardiology Daily Note Rosario Mcginnis MD      Patient:  Matthew Jones  [id number]    Patient Active Problem List    Diagnosis Date Noted    Recurrent pneumonia 03/06/2021     Priority: Low    COPD with acute lower respiratory infection ([address] 02/24/2021     Priority: Low    CKD (chronic kidney disease) stage 3, GFR 30-59 ml/min 02/24/2021     Priority: Low    Low back pain 01/22/2021     Priority: Low    Acute respiratory failure ([address] 01/21/2021     Priority: Low    Type 2 diabetes mellitus with complication, without long-term current use of insulin ([address] 01/21/2021     Priority: Low    Weakness 01/21/2021     Priority: Low    Other dysphagia 01/21/2021     Priority: Low    Severe sepsis ([address] 01/18/2021     Priority: Low    Acute on chronic respiratory failure ([address] 01/18/2021     Priority: Low    Acute on chronic kidney failure ([address] 01/18/2021     Priority: Low    Hypoxemia 01/18/2021     Priority: Low    Metabolic acidosis 67/20/2896     Priority: Low    Acidosis, metabolic, with respiratory acidosis 01/18/2021     Priority: Low    History of bladder cancer 06/15/2020     Priority: Low    Bladder cancer ([address] 12/19/2018     Priority: Low    Postoperative pain 12/19/2018     Priority: Low    Epistaxis 11/06/2018     Priority: Low    Acute blood loss anemia 11/06/2018     Priority: Low    Melena 11/06/2018     Priority: Low    Hypocalcemia 11/06/2018     Priority: Low    Pneumonia due to infectious organism 11/06/2018     Priority: Low    Bleeding diathesis ([address] 11/05/2018     Priority: Low    BPH associated with nocturia      Priority: Low    Acute renal failure ([address]      Priority: Low    Shock liver      Priority: Low    Acute liver failure without hepatic coma 10/23/2018     Priority: Low    Acute kidney injury ([address] 10/23/2018     Priority: Low    CHF (congestive heart failure) ([address] 10/23/2018     Priority: Low    Bilateral pleural effusion 10/23/2018     Priority: Low    Elevated troponin 10/23/2018     Priority: Low    Palliative care patient 10/23/2018     Priority: Low    Arthritis of knee 10/15/2018     Priority: Low    Essential hypertension 10/15/2018     Priority: Low    Pure hypercholesterolemia 10/15/2018     Priority: Low    Slow transit constipation 10/15/2018     Priority: Low    Iron deficiency anemia 10/15/2018     Priority: Low    GERD (gastroesophageal reflux disease) 10/15/2018     Priority: Low    Primary osteoarthritis of left knee 10/14/2018     Priority: Low    Carotid stenosis, asymptomatic, left 08/28/2018     Priority: Low    Bilateral carotid artery stenosis 06/25/2018     Priority: Low    Atherosclerosis of native artery of both lower extremities with intermittent claudication ([facility] 06/25/2018     Priority: Low    Colonic diverticular abscess 08/01/2017     Priority: Low    Generalized abdominal pain      Priority: Low    Diverticulitis of large intestine with perforation without bleeding 06/04/2017     Priority: Low       Admit Date:  3/6/2021    Admission Problem List: Present on Admission:   Recurrent pneumonia      Cardiac Specific Data:  Specialty Problems        Cardiology Problems    Atherosclerosis of native artery of both lower extremities with intermittent claudication (HCC)        Bilateral carotid artery stenosis        Carotid stenosis, asymptomatic, left        Essential hypertension        Pure hypercholesterolemia        CHF (congestive heart failure) ([facility]              Subjective:  Mr. Seema Tapia seen today had developed a hematoma left forearm. Hemoglobin 7.7 blood pressure 103/56 heart 70. No new complaints of dyspnea stable denies chest pain.     Objective:   BP (!) 103/56   Pulse 70   Temp 97.1 °F (36.2 °C) (Temporal)   Resp 18   Ht 6' (1.829 m)   Wt 249 lb 14.4 oz (113.4 kg)   SpO2 94%   BMI 33.89 kg/m²       Intake/Output Summary (Last 24 hours) at 3/12/2021 1823  Last data filed at 3/12/2021 1433  Gross per 24 hour   Intake    Output 1200 ml   Net -1200 ml       Prior to Admission medications    Medication Sig Start Date End Date Taking?  Authorizing Provider   calcium carbonate (TUMS) 500 MG chewable tablet Take 1 tablet by mouth 3 times daily as needed for Heartburn 2/24/21 3/26/21  Nimco Montague, DO   hydrALAZINE (APRESOLINE) 25 MG tablet Take 1 tablet by mouth every 8 hours 2/24/21   [facility], DO   guaiFENesin (ROBITUSSIN) 100 MG/5ML syrup Take 10 mLs by mouth every 4 hours as needed for Cough 2/24/21   [facility], DO   famotidine (PEPCID) 20 MG tablet Take 1 tablet by mouth daily 2/24/21   Nimco Montague, DO   aspirin 81 MG EC tablet Take 1 tablet by mouth daily 1/30/21   Jeff Bauer MD   glipiZIDE (GLUCOTROL) 5 MG tablet Take 1 tablet by mouth every morning (before breakfast) 1/30/21   Jeff Bauer MD   atorvastatin (LIPITOR) 20 MG tablet Take 1 tablet by mouth daily 1/29/21   Jeff Bauer MD   metoprolol succinate (TOPROL XL) 100 MG extended release tablet Take 1 tablet by mouth daily 1/30/21   Jeff Bauer MD   NIFEdipine (ADALAT CC) 60 MG extended release tablet Take 1 tablet by mouth daily 1/30/21   Jeff Bauer MD   tamsulosin (FLOMAX) 0.4 MG capsule Take 1 capsule by mouth daily 1/30/21   Jeff Bauer MD   pantoprazole (PROTONIX) 40 MG tablet Take 1 tablet by mouth daily 1/30/21   Jeff aBuer MD   trospium (SANCTURA) 20 MG tablet Take 1 tablet by mouth nightly 1/29/21   Jeff Bauer MD        [Held by provider] enoxaparin  40 mg Subcutaneous Daily    chlorhexidine   Topical Once    amiodarone  200 mg Oral BID    cefUROXime  500 mg Oral BID    predniSONE  15 mg Oral Daily    Followed by   Lis Kennedy ON 3/15/2021] predniSONE  10 mg Oral Daily    Followed by   Lis Kennedy ON 3/18/2021] predniSONE  5 mg Oral Daily    miconazole   Topical 4x Daily    tamsulosin  0.4 mg Oral BID    atropine  1 mg Intravenous Once    [Held by provider] dilTIAZem  30 mg Oral 3 times per day    ipratropium-albuterol  1 ampule Inhalation Q4H    aspirin  81 mg Oral Daily    atorvastatin  20 mg Oral Daily    insulin lispro  0-6 Units Subcutaneous TID WC    insulin lispro  0-3 Units Subcutaneous Nightly    hydrALAZINE  25 mg Oral 3 times per day    metoprolol succinate  100 mg Oral Daily    NIFEdipine  60 mg Oral Daily    pantoprazole  40 mg Oral Daily    trospium  20 mg Oral Nightly    sodium chloride flush  10 mL Intravenous 2 times per day       TELEMETRY: Sinus     Physical Exam:      Physical Exam      General:  Awake, alert, NAD  Skin:  Warm and dry  Neck:  no jvd , no carotid bruits  Chest:  Clear to auscultation, no wheezing or rales  Cardiovascular:  RRR B2I3 no murmurs, clicks, gallups, or rubs  Abdomen:  Soft nontender, nondistended, bowel sounds present  Extremities:  Edema: none moderate hematoma left forearm proximal      Lab Data:  CBC:   Recent Labs     03/10/21  0528 03/11/21  0259 03/12/21  0350 03/12/21  1350   WBC 7.2 7.7 7.4  --    HGB 7.8* 7.1* 6.3* 7.7*   HCT 25.4* 22.7* 20.4* 24.6*   MCV 91.0 90.8 91.5  --     146 137  --      BMP:   Recent Labs     03/10/21  0528 03/11/21  0259    138   K 4.4 4.9    103   CO2 30* 26   BUN 50* 54*   CREATININE 1.7* 1.6*     LIVER PROFILE:   Recent Labs     03/11/21 0259   AST 18   ALT 26   BILITOT 0.3   ALKPHOS 68     PT/INR: No results for input(s): PROTIME, INR in the last 72 hours. APTT: No results for input(s): APTT in the last 72 hours. BNP:  No results for input(s): BNP in the last 72 hours.   CK, CKMB, Troponin: @LABRCNT (CKTOTAL:3, CKMB:3, TROPONINI:3)@    IMAGING:  Echo Complete 2d W Doppler W Color    Result Date: 3/8/2021  Transthoracic Echocardiography Report (TTE)  Demographics   Patient Name  Corina Pa  Date of Study         03/08/2021                E   MRN           [id number]            Gender                Male   Date of Birth 1940        Room Number           [id number]   Age           79 year(s)   Height:       67 inches         Referring Physician   Ananda Hamlin   Weight:       237.01 pounds     Sonographer           PATRICE Swenson   BSA:          2.29 m^2          Interpreting          Andriy Loo MD                                  Physician   BMI:          32.14 kg/m^2  Procedure Type of Study   TTE procedure:ECHO 2D W/DOPPLER/CONTRAST LIMITD. Study Location: Portable Technical Quality: Adequate visualization Patient Status: Inpatient Contrast Medium: Definity. Amount - 3 ml BP: 129/71 mmHg  Conclusions   Summary  Limited echocardiographic study. LV appears normal in size with preserved LV systolic function. LV ejection  fraction estimated at 60-65%. RV not well visualized but appears normal in size with preserved systolic  function. .   Signature   ----------------------------------------------------------------  Electronically signed by Phil Loo MD(Interpreting physician)  on 03/08/2021 10:13 PM  ----------------------------------------------------------------  M-Mode Measurements (cm)   LVIDd: 4.48 cm                                  LVIDs: 3.01 cm  IVSd: 1.6 cm  LVPWd: 1.46 cm  % Ejection Fraction: 65 %      Echo Complete 2d W Doppler W Color    Result Date: 2/14/2021  Transthoracic Echocardiography Report (TTE)  Demographics   Patient Name  Zenaida Fair  Date of Study         02/14/2021                E   MRN           [id number]            Gender                Male   Date of Birth 1940        Room Number           [id number]   Age           79 year(s)   Height:       72 inches         Referring Physician   lesley argueta   Weight:       232.01 pounds     Sonographer           PATRICE Swenson   BSA:          2.27 m^2          Interpreting          Loren Sauer MD                                  Physician   BMI:          31.47 kg/m^2  Procedure Type of Study   TTE procedure:ECHO NO CONTRAST WITH DOP/COLR.   Study Location: Portable Technical Quality: Adequate visualization Patient Status: Inpatient BP: 112/69 mmHg Indications:Elevated Troponin. Conclusions   Signature   ----------------------------------------------------------------  Electronically signed by Delilah Mcknight MD(Interpreting physician)  on 02/14/2021 02:35 PM  ----------------------------------------------------------------   Findings   Mitral Valve  moderate mitral regurgitation is present. Aortic Valve  Structurally normal aortic valve. Tricuspid Valve  Mild tricuspid regurgitation. RVSP cannot be determined accurately   Pulmonic Valve  The pulmonic valve was not well visualized . Left Atrium  Mildly dilated left atrium. Left Ventricle  Normal left ventricular size with preserved LV function and an estimated  ejection fraction of approximately 60-65%. Grade 2 diastolic dysfunction with increased LA filling pressure   Right Atrium  Normal right atrial dimension with no evidence of thrombus or mass noted. Right Ventricle  Normal right ventricular size with preserved RV function. Miscellaneous  IVC not studied  M-Mode Measurements (cm)   LVIDd: 4.9 cm                        LVIDs: 3.3 cm  IVSd: 1.58 cm  LVPWd: 1.37 cm                       AO Root Dimension: 2.6 cm  % Ejection Fraction: 61 %            LA: 3.8 cm                                       LVOT: 2.2 cm  Doppler Measurements:   AV Peak Gradient: 9.73 mmHg        MV Peak E-Wave: 107 cm/s                                     MV Peak A-Wave: 97.3 cm/s  TR Velocity:197 cm/s               MV E/A Ratio: 1.1 %  TR Gradient:15.52 mmHg             MV Peak Gradient: 4.58 mmHg  Estimated RAP:5 mmHg               MV P1/2t: 47 msec  RVSP:21 mmHg                       MVA by PHT4.68 cm^2      Ct Head Wo Contrast    Result Date: 2/18/2021  Examination. CT HEAD WO CONTRAST 2/18/2021 12:57 PM History: The patient complains of dizziness. DLP: 760 mGycm. The CT scan of the head is performed without intravenous contrast enhancement.  The images are acquired in axial plane with subsequent reconstruction in coronal and sagittal planes. The comparison is made with the previous study dated 1/18/2021. No significant interval change. There is no evidence of a mass. No midline shift. There is no evidence of intracranial hemorrhage or hematoma. Moderately prominent ventricles, basal cisterns cortical sulci are similar to the previous study suggesting moderate chronic volume loss. There are extensive chronic white matter ischemic changes in the centrum semiovale bilaterally. The gray-white matter differentiation is maintained. The images reviewed in bone window show no acute bony abnormality. The visualized paranasal sinuses and mastoid air cells are unremarkable. There are severe atheromatous changes of the intracranial internal carotid arteries bilaterally. The orbits are unremarkable. No acute intracranial abnormality. Moderate cerebral cortical atrophy. Extensive chronic white matter ischemic changes. Signed by Dr Valeriano Ramirez on 2/18/2021 2:23 PM    Ct Chest Wo Contrast    Result Date: 2/13/2021  EXAMINATION: CT CHEST WO CONTRAST 2/13/2021 2:17 PM HISTORY: Shortness of breath, bilateral pneumonia. DOSE: 976 mGycm. Automatic exposure control was utilized in an effort to use as little radiation as possible, without compromising image quality. REPORT: Spiral CT of the chest was performed without contrast, reconstructed coronal and sagittal images are also reviewed. COMPARISON: Portable chest x-ray to 12/20/2021. Review of lung windows demonstrates consolidative infiltrates in both lungs, on the right, this includes extensive infiltrate surrounding the right hilum, with contiguous infiltrate in the right upper lobe, with air bronchograms, there is also consolidation of the right lower lobe with air bronchograms and focal subpleural nodular infiltrates are identified in the right middle lobe and right upper lobe focally.  In the left lung, there is a subpleural infiltrate in the left upper lobe along the major fissure, with mild paraseptal emphysematous changes. This is contiguous with mixed groundglass and consolidative infiltrates in the lingula and there is consolidation of the left lower lobe with air bronchograms, with associated bronchial wall thickening. A small amount of left perihilar infiltrate is also present. No pneumothorax is identified, there is a trace left pleural effusion, small right pleural effusion. No lung abscess or cavitation is seen in association with the areas of pneumonia. Soft tissue windows show a 12 mm low-attenuation lesion in the right thyroid lobe, which is most likely benign. There are normal-sized shotty appearing lymph nodes within the mediastinum without measurable lymphadenopathy. A small amount calcified plaques noted within the aortic arch and there is heavy calcified plaque within the coronary arteries. Heart size is normal. There is trace pericardial fluid. The visualized upper abdomen shows no acute abnormality. There is mild bilateral gynecomastia. Review of bone windows shows advanced degenerative changes in the visualized upper lumbar spine. Consolidating infiltrates within both lungs, greatest in the right lower lobe and most compatible with bilateral pneumonia, there is a small right pleural effusion and trace left pleural effusion. No pneumothorax is identified. There is no lung abscess or areas of cavitation. Mild underlying changes of paraseptal emphysema are noted. No measurable intrathoracic lymphadenopathy is identified. Signed by Dr Mya Barrera on 2/13/2021 2:28 PM    Us Renal Complete    Result Date: 2/13/2021  EXAMINATION: US RENAL COMPLETE 2/13/2021 3:16 PM HISTORY: Acute kidney injury. Report: Sonographic images of the kidneys were obtained. COMPARISON: CT of the abdomen and pelvis with without contrast 7/31/2020. Ultrasound of the kidneys 10/23/2018.  The right kidney measures 11.6 x 5.5 x 6.0 cm and has normal cortical echogenicity, with a cortical thickness that averages 12 mm. No solid mass is identified. There are 2 cysts in the right kidney appear benign, including one at the interpolar level measures 1.3 x 1.4 x 1.0 cm (previous measurement of 0.9 x 0.8 x 0.9 cm) and one at the inferior pole that measures 2.5 x 2.9 x 2.1 cm. This cyst previously measured 2.7 x 2.2 x 2.0 cm and is essentially unchanged Color Doppler images demonstrate vascular flow within the right kidney. The left kidney measures 10.4 x 4.5 x 4.0 cm and has normal morphology and cortical echogenicity without evidence of hydronephrosis or solid mass. There is a benign-appearing cyst at the inferior pole that measures 1 cm, not clearly seen on the previous ultrasound. Color Doppler images demonstrate vascular flow within the left kidney. Additional small cysts are seen in the kidneys on prior CT but poorly visualized on today's examination. The bladder is within normal limits. Bilateral renal cysts as detailed above, with a benign appearance. Some smaller cysts seen in the kidneys on previous CT are not visualized on today's examination No hydronephrosis is identified. Both kidneys have normal cortical echogenicity. The bladder is unremarkable. Signed by Dr Ame Wise. Holly on 2/13/2021 3:21 PM    Xr Chest Portable    Result Date: 3/11/2021  XR CHEST PORTABLE 3/11/2021 3:40 AM HISTORY:   Respiratory distress  Single view. COMPARISONS:  3/7/2021 chest radiography FINDINGS: Central venous catheter is appreciated at the left subclavian/brachiocephalic vein junction. Bilateral perihilar infiltrates and pleural effusions persist, greater on the right, likely unchanged considering differences in technique. 1. Stable 1 day appearance the chest. Signed by Dr Farias on 3/11/2021 7:20 AM    Xr Chest Portable    Result Date: 3/7/2021  Exam: XR CHEST PORTABLE - 3/7/2021 12:14 PM Indication: Central line placement Comparison: 3/6/2021 Findings: CVL tip overlies the SVC. Cardiac silhouette is stable. No significant change in bilateral interstitial and airspace opacity, greatest in the RIGHT lower lobe. No visible pneumothorax. No acute osseous finding. Impression: 1. CVL tip overlies the SVC. 2.  No significant change in bilateral interstitial and airspace opacity, greatest in the RIGHT lower lung. Signed by Dr Thad Pate on 3/7/2021 1:36 PM    Xr Chest Portable    Result Date: 3/6/2021  Exam: XR CHEST PORTABLE - 3/6/2021 11:52 AM Indication: Shortness of air Comparison: 2/12/2021 Findings: Persistent and slightly increased RIGHT greater than LEFT middle and lower lung zone airspace opacity. No large pleural effusion. No pneumothorax. Cardiac silhouette is stable. No acute osseous finding. Impression: Slight increase in bilateral RIGHT greater than LEFT middle and lower lung zone airspace opacity. Signed by Dr Thad Pate on 3/6/2021 1:14 PM    Xr Chest Portable    Result Date: 2/12/2021  EXAMINATION: XR CHEST PORTABLE 2/12/2021 4:13 PM HISTORY: Shortness of breath, hypoxia, recent pneumonia COMPARISON: 1/18/2021 FINDINGS: The heart appears normal in size. Atherosclerotic calcifications are seen in the aorta. Bilateral airspace opacities are present with relative sparing of the upper lobes, markedly increased compared to the prior exam. There is no appreciable pneumothorax or definite pleural effusion. Bilateral pneumonia, though worse when compared to the prior exam. Follow-up PA and lateral chest radiograph to recommended in 6-8 weeks to document resolution. Signed by Dr Marion Mckay on 2/12/2021 4:14 PM    Cta Pulmonary W Contrast    Result Date: 3/6/2021  Exam: CT chest angiography with 3D MIP images with IV contrast - 3/6/2021 12:33 PM Indication: Worsening respiratory function, history of pneumonia Comparison: 2/13/2021 DLP: 927 mGy cm.  In order to have a CT radiation dose as low as reasonably achievable, Automated Exposure Control was utilized for adjustment of the mA and/or KV according to patient size. Findings: No evidence of pulmonary embolus. Main pulmonary artery is upper limits of normal in caliber. Thoracic aorta is nonaneurysmal. Atherosclerotic change in the aortic arch and coronary arteries noted. No pericardial effusion. Central airways are clear. Interlobular septal thickening. Slight interval worsening of multifocal bilateral consolidation and groundglass opacity. Disease burden is greatest in the RIGHT upper lobe. Small RIGHT greater than LEFT pleural effusions with adjacent atelectasis. No pneumothorax. Mild mediastinal lymphadenopathy similar to prior and likely reactive. 1.3 cm RIGHT thyroid nodule. No acute chest wall soft tissue abnormality. No acute osseous finding. Thoracic spine is scoliotic curvature and degenerative change. No acute osseous finding. Impression: 1. No evidence of pulmonary embolus. 2.  Slight progression of bilateral RIGHT greater than LEFT consolidation, suspicious for pneumonia. 3.  Small bilateral pleural effusions and interstitial pulmonary edema. Signed by Dr Elmira Lake on 3/6/2021 1:56 PM        Assessment:  1. Recurrent pneumonia  2. Diverticular disease  3. Carotid artery stenosis  4. Peripheral arterial disease  5. Osteoarthritis  6. Hyperlipidemia  7. Hypertension  8. Iron deficiency anemia  9. Gastroesophageal reflux disease  10. Acute kidney injury  11. Epistaxis  12. Bladder cancer   13. Diabetes mellitus type 2  14. Nonsustained ventricular tachycardia  15. Echocardiogram 2/12/2021 ejection fraction 60 to 88% grade 2 diastolic dysfunction dilated left atrium  16. 5.70-second pause documented evening of 3/7/2021  17. Chronic kidney disease creatinine 1.6  18. Cardiac catheterization 3/10/2021 100% occlusion right coronary artery well collateralized from the left  19. Hematoma left forearm    Plan:  1.  After discussion we will reschedule pacemaker for tomorrow a.m. further comments at that time    Vero Chopra MD 3/12/2021 6:23 PM

## 2021-03-13 NOTE — PROGRESS NOTES
Patient left incision site area remains soft, dressing has old drainage-no active bleeding noted. Area is marked. Sling intact. Patient is sitting up in bed.

## 2021-03-13 NOTE — PROGRESS NOTES
Pt stated carter was leaking. there was some wetness on gown around site. 5 cc of saline added to carter bolb.

## 2021-03-13 NOTE — PROGRESS NOTES
Pt told nurse that pulmonologist told patient we could turn off alarms on bipap. This is a hospital policy that we can not turn off alarms on bipap. Notified patient and explained this to patient. Patient is argumentive stating that he was told by multiple people alarms can be turned off. Patient was educated on policy and reason why this can not be safely done. He was educated by respiratory staff and 2 rns. Told patient he has the right to refuse bipap if he sees the alarms are too much for him to handle. Pt agrees to try mask for a while.

## 2021-03-14 LAB
BASOPHILS ABSOLUTE: 0 K/UL (ref 0–0.2)
BASOPHILS RELATIVE PERCENT: 0.1 % (ref 0–1)
EOSINOPHILS ABSOLUTE: 0 K/UL (ref 0–0.6)
EOSINOPHILS RELATIVE PERCENT: 0 % (ref 0–5)
GLUCOSE BLD-MCNC: 254 MG/DL (ref 70–99)
GLUCOSE BLD-MCNC: 275 MG/DL (ref 70–99)
GLUCOSE BLD-MCNC: 290 MG/DL (ref 70–99)
HCT VFR BLD CALC: 23.7 % (ref 42–52)
HEMOGLOBIN: 7 G/DL (ref 14–18)
IMMATURE GRANULOCYTES #: 0.6 K/UL
LYMPHOCYTES ABSOLUTE: 0.6 K/UL (ref 1.1–4.5)
LYMPHOCYTES RELATIVE PERCENT: 6.7 % (ref 20–40)
MCH RBC QN AUTO: 28.2 PG (ref 27–31)
MCHC RBC AUTO-ENTMCNC: 29.5 G/DL (ref 33–37)
MCV RBC AUTO: 95.6 FL (ref 80–94)
MONOCYTES ABSOLUTE: 0.7 K/UL (ref 0–0.9)
MONOCYTES RELATIVE PERCENT: 8.5 % (ref 0–10)
NEUTROPHILS ABSOLUTE: 6.6 K/UL (ref 1.5–7.5)
NEUTROPHILS RELATIVE PERCENT: 77.9 % (ref 50–65)
PDW BLD-RTO: 15.4 % (ref 11.5–14.5)
PERFORMED ON: ABNORMAL
PLATELET # BLD: 114 K/UL (ref 130–400)
PMV BLD AUTO: 13.1 FL (ref 9.4–12.4)
RBC # BLD: 2.48 M/UL (ref 4.7–6.1)
WBC # BLD: 8.5 K/UL (ref 4.8–10.8)

## 2021-03-14 PROCEDURE — 94640 AIRWAY INHALATION TREATMENT: CPT

## 2021-03-14 PROCEDURE — 6370000000 HC RX 637 (ALT 250 FOR IP): Performed by: INTERNAL MEDICINE

## 2021-03-14 PROCEDURE — 85025 COMPLETE CBC W/AUTO DIFF WBC: CPT

## 2021-03-14 PROCEDURE — 82947 ASSAY GLUCOSE BLOOD QUANT: CPT

## 2021-03-14 PROCEDURE — 2580000003 HC RX 258: Performed by: INTERNAL MEDICINE

## 2021-03-14 PROCEDURE — 6370000000 HC RX 637 (ALT 250 FOR IP): Performed by: HOSPITALIST

## 2021-03-14 PROCEDURE — 2700000000 HC OXYGEN THERAPY PER DAY

## 2021-03-14 PROCEDURE — 99024 POSTOP FOLLOW-UP VISIT: CPT | Performed by: INTERNAL MEDICINE

## 2021-03-14 PROCEDURE — 94660 CPAP INITIATION&MGMT: CPT

## 2021-03-14 PROCEDURE — 2140000000 HC CCU INTERMEDIATE R&B

## 2021-03-14 RX ADMIN — ASPIRIN 81 MG: 81 TABLET, FILM COATED ORAL at 09:05

## 2021-03-14 RX ADMIN — IPRATROPIUM BROMIDE AND ALBUTEROL SULFATE 1 AMPULE: 2.5; .5 SOLUTION RESPIRATORY (INHALATION) at 07:11

## 2021-03-14 RX ADMIN — HYDRALAZINE HYDROCHLORIDE 25 MG: 25 TABLET, FILM COATED ORAL at 14:02

## 2021-03-14 RX ADMIN — NIFEDIPINE 60 MG: 60 TABLET, FILM COATED, EXTENDED RELEASE ORAL at 09:05

## 2021-03-14 RX ADMIN — HYDRALAZINE HYDROCHLORIDE 25 MG: 25 TABLET, FILM COATED ORAL at 20:33

## 2021-03-14 RX ADMIN — MICONAZOLE NITRATE: 2 POWDER TOPICAL at 09:06

## 2021-03-14 RX ADMIN — HYDRALAZINE HYDROCHLORIDE 25 MG: 25 TABLET, FILM COATED ORAL at 06:11

## 2021-03-14 RX ADMIN — SODIUM CHLORIDE: 9 INJECTION, SOLUTION INTRAVENOUS at 04:52

## 2021-03-14 RX ADMIN — IPRATROPIUM BROMIDE AND ALBUTEROL SULFATE 1 AMPULE: 2.5; .5 SOLUTION RESPIRATORY (INHALATION) at 22:06

## 2021-03-14 RX ADMIN — SODIUM CHLORIDE, PRESERVATIVE FREE 10 ML: 5 INJECTION INTRAVENOUS at 09:05

## 2021-03-14 RX ADMIN — SODIUM CHLORIDE, PRESERVATIVE FREE 10 ML: 5 INJECTION INTRAVENOUS at 20:34

## 2021-03-14 RX ADMIN — IPRATROPIUM BROMIDE AND ALBUTEROL SULFATE 1 AMPULE: 2.5; .5 SOLUTION RESPIRATORY (INHALATION) at 01:55

## 2021-03-14 RX ADMIN — AMIODARONE HYDROCHLORIDE 200 MG: 200 TABLET ORAL at 09:05

## 2021-03-14 RX ADMIN — INSULIN LISPRO 3 UNITS: 100 INJECTION, SOLUTION INTRAVENOUS; SUBCUTANEOUS at 17:54

## 2021-03-14 RX ADMIN — TAMSULOSIN HYDROCHLORIDE 0.4 MG: 0.4 CAPSULE ORAL at 20:33

## 2021-03-14 RX ADMIN — INSULIN LISPRO 3 UNITS: 100 INJECTION, SOLUTION INTRAVENOUS; SUBCUTANEOUS at 14:02

## 2021-03-14 RX ADMIN — IPRATROPIUM BROMIDE AND ALBUTEROL SULFATE 1 AMPULE: 2.5; .5 SOLUTION RESPIRATORY (INHALATION) at 15:18

## 2021-03-14 RX ADMIN — PANTOPRAZOLE SODIUM 40 MG: 40 TABLET, DELAYED RELEASE ORAL at 09:05

## 2021-03-14 RX ADMIN — INSULIN LISPRO 2 UNITS: 100 INJECTION, SOLUTION INTRAVENOUS; SUBCUTANEOUS at 20:46

## 2021-03-14 RX ADMIN — AMIODARONE HYDROCHLORIDE 200 MG: 200 TABLET ORAL at 20:34

## 2021-03-14 RX ADMIN — METOPROLOL SUCCINATE 100 MG: 50 TABLET, EXTENDED RELEASE ORAL at 09:04

## 2021-03-14 RX ADMIN — ATORVASTATIN CALCIUM 20 MG: 20 TABLET, FILM COATED ORAL at 09:05

## 2021-03-14 RX ADMIN — MICONAZOLE NITRATE: 2 POWDER TOPICAL at 14:02

## 2021-03-14 RX ADMIN — IPRATROPIUM BROMIDE AND ALBUTEROL SULFATE 1 AMPULE: 2.5; .5 SOLUTION RESPIRATORY (INHALATION) at 19:25

## 2021-03-14 RX ADMIN — TAMSULOSIN HYDROCHLORIDE 0.4 MG: 0.4 CAPSULE ORAL at 09:05

## 2021-03-14 RX ADMIN — IPRATROPIUM BROMIDE AND ALBUTEROL SULFATE 1 AMPULE: 2.5; .5 SOLUTION RESPIRATORY (INHALATION) at 10:59

## 2021-03-14 RX ADMIN — MICONAZOLE NITRATE: 2 POWDER TOPICAL at 20:36

## 2021-03-14 RX ADMIN — TROSPIUM CHLORIDE 20 MG: 20 TABLET, FILM COATED ORAL at 20:34

## 2021-03-14 RX ADMIN — PREDNISONE 15 MG: 10 TABLET ORAL at 09:05

## 2021-03-14 ASSESSMENT — PAIN SCALES - GENERAL
PAINLEVEL_OUTOF10: 0
PAINLEVEL_OUTOF10: 0

## 2021-03-14 ASSESSMENT — ENCOUNTER SYMPTOMS
VOICE CHANGE: 0
BACK PAIN: 0
SHORTNESS OF BREATH: 0
NAUSEA: 0
VOMITING: 0
CONSTIPATION: 0
COLOR CHANGE: 0
DIARRHEA: 0

## 2021-03-14 NOTE — PROGRESS NOTES
Low    Bilateral pleural effusion 10/23/2018     Priority: Low    Elevated troponin 10/23/2018     Priority: Low    Palliative care patient 10/23/2018     Priority: Low    Arthritis of knee 10/15/2018     Priority: Low    Essential hypertension 10/15/2018     Priority: Low    Pure hypercholesterolemia 10/15/2018     Priority: Low    Slow transit constipation 10/15/2018     Priority: Low    Iron deficiency anemia 10/15/2018     Priority: Low    GERD (gastroesophageal reflux disease) 10/15/2018     Priority: Low    Primary osteoarthritis of left knee 10/14/2018     Priority: Low    Carotid stenosis, asymptomatic, left 08/28/2018     Priority: Low    Bilateral carotid artery stenosis 06/25/2018     Priority: Low    Atherosclerosis of native artery of both lower extremities with intermittent claudication ([facility] [address] 06/25/2018     Priority: Low    Colonic diverticular abscess 08/01/2017     Priority: Low    Generalized abdominal pain      Priority: Low    Diverticulitis of large intestine with perforation without bleeding 06/04/2017     Priority: Low       Admit Date:  3/6/2021    Admission Problem List: Present on Admission:   Recurrent pneumonia   Nonsustained ventricular tachycardia ([facility] [address]      Cardiac Specific Data:  Specialty Problems        Cardiology Problems    Nonsustained ventricular tachycardia (HCC)        Atherosclerosis of native artery of both lower extremities with intermittent claudication (HCC)        Bilateral carotid artery stenosis        Carotid stenosis, asymptomatic, left        Essential hypertension        Pure hypercholesterolemia        CHF (congestive heart failure) ([facility] [address]              Subjective:  Mr. Adam Barnett seen today underwent dual-chamber pacemaker implantation yesterday tolerated well. Today wound healing appropriately no hematoma. Postprocedural chest x-ray no pneumothorax or other abnormalities. Device interrogated today functioning appropriately.   No other issues reported. Objective:   /61   Pulse 61   Temp 97.7 °F (36.5 °C) (Temporal)   Resp 18   Ht 6' (1.829 m)   Wt 250 lb (113.4 kg)   SpO2 92%   BMI 33.91 kg/m²       Intake/Output Summary (Last 24 hours) at 3/14/2021 1322  Last data filed at 3/14/2021 0944  Gross per 24 hour   Intake 1529 ml   Output 2460 ml   Net -931 ml       Prior to Admission medications    Medication Sig Start Date End Date Taking?  Authorizing Provider   calcium carbonate (TUMS) 500 MG chewable tablet Take 1 tablet by mouth 3 times daily as needed for Heartburn 2/24/21 3/26/21  [facility], DO   hydrALAZINE (APRESOLINE) 25 MG tablet Take 1 tablet by mouth every 8 hours 2/24/21   [facility], DO   guaiFENesin (ROBITUSSIN) 100 MG/5ML syrup Take 10 mLs by mouth every 4 hours as needed for Cough 2/24/21   [facility], DO   famotidine (PEPCID) 20 MG tablet Take 1 tablet by mouth daily 2/24/21   [facility], DO   aspirin 81 MG EC tablet Take 1 tablet by mouth daily 1/30/21   Claude Bologna, MD   glipiZIDE (GLUCOTROL) 5 MG tablet Take 1 tablet by mouth every morning (before breakfast) 1/30/21   Claude Bologna, MD   atorvastatin (LIPITOR) 20 MG tablet Take 1 tablet by mouth daily 1/29/21   Claude Bologna, MD   metoprolol succinate (TOPROL XL) 100 MG extended release tablet Take 1 tablet by mouth daily 1/30/21   Claude Bologna, MD   NIFEdipine (ADALAT CC) 60 MG extended release tablet Take 1 tablet by mouth daily 1/30/21   Claude Bologna, MD   tamsulosin (FLOMAX) 0.4 MG capsule Take 1 capsule by mouth daily 1/30/21   Claude Bologna, MD   pantoprazole (PROTONIX) 40 MG tablet Take 1 tablet by mouth daily 1/30/21   Claude Bologna, MD   trospium (SANCTURA) 20 MG tablet Take 1 tablet by mouth nightly 1/29/21   Claude Bologna, MD        sodium chloride flush  10 mL Intravenous 2 times per day    [Held by provider] enoxaparin  40 mg Subcutaneous Daily    chlorhexidine   Topical Once    amiodarone  200 mg Oral BID    [START ON 3/15/2021] predniSONE  10 mg Oral Daily    Followed by   Helena Flores ON 3/18/2021] predniSONE  5 mg Oral Daily    miconazole   Topical 4x Daily    tamsulosin  0.4 mg Oral BID    atropine  1 mg Intravenous Once    [Held by provider] dilTIAZem  30 mg Oral 3 times per day    ipratropium-albuterol  1 ampule Inhalation Q4H    aspirin  81 mg Oral Daily    atorvastatin  20 mg Oral Daily    insulin lispro  0-6 Units Subcutaneous TID WC    insulin lispro  0-3 Units Subcutaneous Nightly    hydrALAZINE  25 mg Oral 3 times per day    metoprolol succinate  100 mg Oral Daily    NIFEdipine  60 mg Oral Daily    pantoprazole  40 mg Oral Daily    trospium  20 mg Oral Nightly       TELEMETRY: Sinus     Physical Exam:      Physical Exam      General:  Awake, alert, NAD  Skin:  Warm and dry  Neck:  no jvd , no carotid bruits  Chest:  Clear to auscultation, no wheezing or rales  Cardiovascular:  RRR [id number] no murmurs, clicks, gallups, or rubs  Abdomen:  Soft nontender, nondistended, bowel sounds present  Extremities:  Edema: none      Lab Data:  CBC:   Recent Labs     03/12/21  0350 03/12/21  1350 03/13/21  0240 03/13/21  1040 03/14/21  0613   WBC 7.4  --  9.7  --  8.5   HGB 6.3* 7.7* 7.6* 8.4* 7.0*   HCT 20.4* 24.6* 24.1*  --  23.7*   MCV 91.5  --  93.8  --  95.6*     --  135  --  114*     BMP:   Recent Labs     03/13/21  1040   CO2 26   CREATININE 1.5*     LIVER PROFILE: No results for input(s): AST, ALT, LIPASE, BILIDIR, BILITOT, ALKPHOS in the last 72 hours. Invalid input(s): AMYLASE,  ALB  PT/INR: No results for input(s): PROTIME, INR in the last 72 hours. APTT: No results for input(s): APTT in the last 72 hours. BNP:  No results for input(s): BNP in the last 72 hours.   CK, CKMB, Troponin: @LABRCNT (CKTOTAL:3, CKMB:3, TROPONINI:3)@    IMAGING:  Echo Complete 2d W Doppler W Color    Result Date: 3/8/2021  Transthoracic Echocardiography Report (TTE)  Demographics   Patient Name  Dorenda Litter  Date of Study         03/08/2021                E Procedure Type of Study   TTE procedure:ECHO NO CONTRAST WITH DOP/COLR. Study Location: Portable Technical Quality: Adequate visualization Patient Status: Inpatient BP: 112/69 mmHg Indications:Elevated Troponin. Conclusions   Signature   ----------------------------------------------------------------  Electronically signed by Domi Harrison MD(Interpreting physician)  on 02/14/2021 02:35 PM  ----------------------------------------------------------------   Findings   Mitral Valve  moderate mitral regurgitation is present. Aortic Valve  Structurally normal aortic valve. Tricuspid Valve  Mild tricuspid regurgitation. RVSP cannot be determined accurately   Pulmonic Valve  The pulmonic valve was not well visualized . Left Atrium  Mildly dilated left atrium. Left Ventricle  Normal left ventricular size with preserved LV function and an estimated  ejection fraction of approximately 60-65%. Grade 2 diastolic dysfunction with increased LA filling pressure   Right Atrium  Normal right atrial dimension with no evidence of thrombus or mass noted. Right Ventricle  Normal right ventricular size with preserved RV function. Miscellaneous  IVC not studied  M-Mode Measurements (cm)   LVIDd: 4.9 cm                        LVIDs: 3.3 cm  IVSd: 1.58 cm  LVPWd: 1.37 cm                       AO Root Dimension: 2.6 cm  % Ejection Fraction: 61 %            LA: 3.8 cm                                       LVOT: 2.2 cm  Doppler Measurements:   AV Peak Gradient: 9.73 mmHg        MV Peak E-Wave: 107 cm/s                                     MV Peak A-Wave: 97.3 cm/s  TR Velocity:197 cm/s               MV E/A Ratio: 1.1 %  TR Gradient:15.52 mmHg             MV Peak Gradient: 4.58 mmHg  Estimated RAP:5 mmHg               MV P1/2t: 47 msec  RVSP:21 mmHg                       MVA by PHT4.68 cm^2      Ct Head Wo Contrast    Result Date: 2/18/2021  Examination.  CT HEAD WO CONTRAST 2/18/2021 12:57 PM History: The patient complains of dizziness. DLP: 760 mGycm. The CT scan of the head is performed without intravenous contrast enhancement. The images are acquired in axial plane with subsequent reconstruction in coronal and sagittal planes. The comparison is made with the previous study dated 1/18/2021. No significant interval change. There is no evidence of a mass. No midline shift. There is no evidence of intracranial hemorrhage or hematoma. Moderately prominent ventricles, basal cisterns cortical sulci are similar to the previous study suggesting moderate chronic volume loss. There are extensive chronic white matter ischemic changes in the centrum semiovale bilaterally. The gray-white matter differentiation is maintained. The images reviewed in bone window show no acute bony abnormality. The visualized paranasal sinuses and mastoid air cells are unremarkable. There are severe atheromatous changes of the intracranial internal carotid arteries bilaterally. The orbits are unremarkable. No acute intracranial abnormality. Moderate cerebral cortical atrophy. Extensive chronic white matter ischemic changes. Signed by Dr Kathy Del Rosario on 2/18/2021 2:23 PM    Ct Chest Wo Contrast    Result Date: 2/13/2021  EXAMINATION: CT CHEST WO CONTRAST 2/13/2021 2:17 PM HISTORY: Shortness of breath, bilateral pneumonia. DOSE: 976 mGycm. Automatic exposure control was utilized in an effort to use as little radiation as possible, without compromising image quality. REPORT: Spiral CT of the chest was performed without contrast, reconstructed coronal and sagittal images are also reviewed. COMPARISON: Portable chest x-ray to 12/20/2021.  Review of lung windows demonstrates consolidative infiltrates in both lungs, on the right, this includes extensive infiltrate surrounding the right hilum, with contiguous infiltrate in the right upper lobe, with air bronchograms, there is also consolidation of the right lower lobe with air bronchograms and focal subpleural nodular infiltrates are identified in the right middle lobe and right upper lobe focally. In the left lung, there is a subpleural infiltrate in the left upper lobe along the major fissure, with mild paraseptal emphysematous changes. This is contiguous with mixed groundglass and consolidative infiltrates in the lingula and there is consolidation of the left lower lobe with air bronchograms, with associated bronchial wall thickening. A small amount of left perihilar infiltrate is also present. No pneumothorax is identified, there is a trace left pleural effusion, small right pleural effusion. No lung abscess or cavitation is seen in association with the areas of pneumonia. Soft tissue windows show a 12 mm low-attenuation lesion in the right thyroid lobe, which is most likely benign. There are normal-sized shotty appearing lymph nodes within the mediastinum without measurable lymphadenopathy. A small amount calcified plaques noted within the aortic arch and there is heavy calcified plaque within the coronary arteries. Heart size is normal. There is trace pericardial fluid. The visualized upper abdomen shows no acute abnormality. There is mild bilateral gynecomastia. Review of bone windows shows advanced degenerative changes in the visualized upper lumbar spine. Consolidating infiltrates within both lungs, greatest in the right lower lobe and most compatible with bilateral pneumonia, there is a small right pleural effusion and trace left pleural effusion. No pneumothorax is identified. There is no lung abscess or areas of cavitation. Mild underlying changes of paraseptal emphysema are noted. No measurable intrathoracic lymphadenopathy is identified. Signed by Dr Sana Barrera on 2/13/2021 2:28 PM    Us Renal Complete    Result Date: 2/13/2021  EXAMINATION: US RENAL COMPLETE 2/13/2021 3:16 PM HISTORY: Acute kidney injury. Report: Sonographic images of the kidneys were obtained.  COMPARISON: CT of the abdomen and pelvis complications. Signed by Dr Elizabeth Pineda on 3/13/2021 1:44 PM    Xr Chest Portable    Result Date: 3/11/2021  XR CHEST PORTABLE 3/11/2021 3:40 AM HISTORY:   Respiratory distress  Single view. COMPARISONS:  3/7/2021 chest radiography FINDINGS: Central venous catheter is appreciated at the left subclavian/brachiocephalic vein junction. Bilateral perihilar infiltrates and pleural effusions persist, greater on the right, likely unchanged considering differences in technique. 1. Stable 1 day appearance the chest. Signed by Dr Elizabeth Pineda on 3/11/2021 7:20 AM    Xr Chest Portable    Result Date: 3/7/2021  Exam: XR CHEST PORTABLE - 3/7/2021 12:14 PM Indication: Central line placement Comparison: 3/6/2021 Findings: CVL tip overlies the SVC. Cardiac silhouette is stable. No significant change in bilateral interstitial and airspace opacity, greatest in the RIGHT lower lobe. No visible pneumothorax. No acute osseous finding. Impression: 1. CVL tip overlies the SVC. 2.  No significant change in bilateral interstitial and airspace opacity, greatest in the RIGHT lower lung. Signed by Dr Mimi Miller on 3/7/2021 1:36 PM    Xr Chest Portable    Result Date: 3/6/2021  Exam: XR CHEST PORTABLE - 3/6/2021 11:52 AM Indication: Shortness of air Comparison: 2/12/2021 Findings: Persistent and slightly increased RIGHT greater than LEFT middle and lower lung zone airspace opacity. No large pleural effusion. No pneumothorax. Cardiac silhouette is stable. No acute osseous finding. Impression: Slight increase in bilateral RIGHT greater than LEFT middle and lower lung zone airspace opacity. Signed by Dr Mimi Miller on 3/6/2021 1:14 PM    Xr Chest Portable    Result Date: 2/12/2021  EXAMINATION: XR CHEST PORTABLE 2/12/2021 4:13 PM HISTORY: Shortness of breath, hypoxia, recent pneumonia COMPARISON: 1/18/2021 FINDINGS: The heart appears normal in size. Atherosclerotic calcifications are seen in the aorta.  Bilateral airspace opacities are present with relative sparing of the upper lobes, markedly increased compared to the prior exam. There is no appreciable pneumothorax or definite pleural effusion. Bilateral pneumonia, though worse when compared to the prior exam. Follow-up PA and lateral chest radiograph to recommended in 6-8 weeks to document resolution. Signed by Dr Fowler Cancer on 2/12/2021 4:14 PM    Cta Pulmonary W Contrast    Result Date: 3/6/2021  Exam: CT chest angiography with 3D MIP images with IV contrast - 3/6/2021 12:33 PM Indication: Worsening respiratory function, history of pneumonia Comparison: 2/13/2021 DLP: 927 mGy cm. In order to have a CT radiation dose as low as reasonably achievable, Automated Exposure Control was utilized for adjustment of the mA and/or KV according to patient size. Findings: No evidence of pulmonary embolus. Main pulmonary artery is upper limits of normal in caliber. Thoracic aorta is nonaneurysmal. Atherosclerotic change in the aortic arch and coronary arteries noted. No pericardial effusion. Central airways are clear. Interlobular septal thickening. Slight interval worsening of multifocal bilateral consolidation and groundglass opacity. Disease burden is greatest in the RIGHT upper lobe. Small RIGHT greater than LEFT pleural effusions with adjacent atelectasis. No pneumothorax. Mild mediastinal lymphadenopathy similar to prior and likely reactive. 1.3 cm RIGHT thyroid nodule. No acute chest wall soft tissue abnormality. No acute osseous finding. Thoracic spine is scoliotic curvature and degenerative change. No acute osseous finding. Impression: 1. No evidence of pulmonary embolus. 2.  Slight progression of bilateral RIGHT greater than LEFT consolidation, suspicious for pneumonia. 3.  Small bilateral pleural effusions and interstitial pulmonary edema. Signed by Dr Vaca [place] on 3/6/2021 1:56 PM        Assessment:  1. Recurrent pneumonia  2. Diverticular disease  3.  Carotid artery stenosis 4. Peripheral arterial disease  5. Osteoarthritis  6. Hyperlipidemia  7. Hypertension  8. Iron deficiency anemia  9. Gastroesophageal reflux disease  10. Acute kidney injury  11. Epistaxis  12. Bladder cancer   13. Diabetes mellitus type 2  14. Nonsustained ventricular tachycardia  15. Echocardiogram 2/12/2021 ejection fraction 60 to 77% grade 2 diastolic dysfunction dilated left atrium  16. 5.70-second pause documented evening of 3/7/2021  17. Chronic kidney disease creatinine 1.6  18. Cardiac catheterization 3/10/2021 100% occlusion right coronary artery well collateralized from the left  19. Hematoma left forearm  20. Status post dual-chamber pacemaker 3/13/2021    Plan:  1.  Continue current therapy stable from a cardiac standpoint discharge as per primary service    Antonia Pierce MD 3/14/2021 1:22 PM

## 2021-03-14 NOTE — PROGRESS NOTES
Patient still refusing to wear BiPAP. Patient wants the alarms turned off, but I explained to him that it was a safety issue, and against hospital policy to turn them off. He stated he understood, and would rather wait to use the Trilogy . Will continue to monitor.

## 2021-03-14 NOTE — PROGRESS NOTES
Hospitalist Progress Note    Patient:  Julio C Crouch  YOB: 1940  Date of Service: 3/14/2021  MRN: [id number]   Acct: [de-identified]   Primary Care Physician: Ramin Thorne MD  Advance Directive: Full Code  Admit Date: 3/6/2021       Hospital Day: 8  Referring Provider: Shivam Vick DO    Patient Seen, Chart, Consults, Notes, Labs, Radiology studies reviewed. Subjective:  Julio C Crouch is a 79 y.o. male  whom we are following for symptomatic bradycardia, hypercapnic respiratory failure, CKD 3. He is feeling better. Pacemaker is working well. He states that he would like to be evaluated for inpatient rehab. He is not quite ready from a functional standpoint to go home. He did not do as well with home physical therapy last time. I feel he would be a good candidate for inpatient rehab. He is motivated.     Allergies:  Eliquis [apixaban] and Promethazine hcl    Medicines:  Current Facility-Administered Medications   Medication Dose Route Frequency Provider Last Rate Last Admin    0.9 % sodium chloride infusion   Intravenous Continuous Zora Kimball MD 75 mL/hr at 03/14/21 0452 New Bag at 03/14/21 0452    sodium chloride flush 0.9 % injection 10 mL  10 mL Intravenous 2 times per day Zora Kimball MD   10 mL at 03/14/21 0905    sodium chloride flush 0.9 % injection 10 mL  10 mL Intravenous PRN Zora Kimball MD        ondansetron [facility]) injection 4 mg  4 mg Intravenous Q6H PRN Zora Kimball MD        [Held by provider] enoxaparin (LOVENOX) injection 40 mg  40 mg Subcutaneous Daily Nabila Walsh MD        chlorhexidine (HIBICLENS) 4 % liquid   Topical Once Zora Kimball MD        amiodarone (CORDARONE) tablet 200 mg  200 mg Oral BID Zora Kimball MD   200 mg at 03/14/21 0905    [START ON 3/15/2021] predniSONE (DELTASONE) tablet 10 mg  10 mg Oral Daily Jet Holt MD        Followed by   Rand Lucas ON 3/18/2021] predniSONE (DELTASONE) tablet 5 mg  5 mg Oral Daily Jet Holt MD        miconazole (MICOTIN) 2 % powder   Topical 4x Daily Shelyb Stanley MD   Given at 03/14/21 1402    tamsulosin (FLOMAX) capsule 0.4 mg  0.4 mg Oral BID Paula Paul MD   0.4 mg at 03/14/21 5090    amiodarone (CORDARONE) 450 mg in dextrose 5 % 250 mL infusion  0.5 mg/min Intravenous Continuous Shelby Stanley MD 16.7 mL/hr at 03/09/21 0138 0.5 mg/min at 03/09/21 0138    atropine injection 1 mg  1 mg Intravenous Once Get Smith MD   Stopped at 03/07/21 1519    [Held by provider] dilTIAZem (CARDIZEM) tablet 30 mg  30 mg Oral 3 times per day Get Smith MD   Stopped at 03/07/21 2043    labetalol (NORMODYNE;TRANDATE) injection 20 mg  20 mg Intravenous Q4H PRN Shelby Stanley MD        ipratropium-albuterol (DUONEB) nebulizer solution 1 ampule  1 ampule Inhalation Q4H Shelby Stanley MD   1 ampule at 03/14/21 1518    aspirin EC tablet 81 mg  81 mg Oral Daily Shelby Stanley MD   81 mg at 03/14/21 0905    atorvastatin (LIPITOR) tablet 20 mg  20 mg Oral Daily Shelby Stanley MD   20 mg at 03/14/21 0905    calcium carbonate (TUMS) chewable tablet 500 mg  500 mg Oral TID PRN Shelby Stanley MD   500 mg at 03/11/21 2120    insulin lispro (HUMALOG) injection vial 0-6 Units  0-6 Units Subcutaneous TID WC Shelby Stanley MD   3 Units at 03/14/21 1402    insulin lispro (HUMALOG) injection vial 0-3 Units  0-3 Units Subcutaneous Nightly Shelby Stnaley MD   1 Units at 03/12/21 2132    guaiFENesin (ROBITUSSIN) 100 MG/5ML liquid 200 mg  200 mg Oral Q4H PRN Shelby Stanley MD        hydrALAZINE (APRESOLINE) tablet 25 mg  25 mg Oral 3 times per day Shelby Stanley MD   25 mg at 03/14/21 1402    metoprolol succinate (TOPROL XL) extended release tablet 100 mg  100 mg Oral Daily Shelby Stanley MD   100 mg at 03/14/21 0904    NIFEdipine (PROCARDIA XL) extended release tablet 60 mg  60 mg Oral Daily Shelby Stanley MD   60 mg at 03/14/21 0905    pantoprazole (PROTONIX) tablet 40 mg  40 mg Oral Daily Myrna Tesfaye MD   40 mg at 03/14/21 8524    trospium (SANCTURA) tablet 20 mg  20 mg Oral Nightly Myrna Tesfaye MD   20 mg at 03/13/21 2020    polyethylene glycol (GLYCOLAX) packet 17 g  17 g Oral Daily PRN Myrna Tesfaye MD   17 g at 03/11/21 1038    acetaminophen (TYLENOL) tablet 650 mg  650 mg Oral Q6H PRN Myrna Tesfaye MD        Or    acetaminophen (TYLENOL) suppository 650 mg  650 mg Rectal Q6H PRN Myrna Tesfaye MD        glucose (GLUTOSE) 40 % oral gel 15 g  15 g Oral PRN Myrna Tesfaye MD        dextrose 50 % IV solution  12.5 g Intravenous PRN Myrna Tesfaye MD        glucagon (rDNA) injection 1 mg  1 mg Intramuscular PRN Myrna Tesfaye MD        dextrose 5 % solution  100 mL/hr Intravenous PRN Myrna Tesfaye MD           Past Medical History:  Past Medical History:   Diagnosis Date    Acute liver failure without hepatic coma 10/23/2018    Back pain     \"with tired legs as a result\"    Bladder cancer ([address] 12/19/2018    Blood circulation, collateral     Carotid arterial disease ([address]     recent surgery    CKD (chronic kidney disease), 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/2018    COPD with acute lower respiratory infection ([address] 2/24/2021    GERD (gastroesophageal reflux disease)     Hyperlipidemia     Hypertension     Hypertension     Palliative care patient 10/23/2018    Pneumonia due to infectious organism 11/06/2018    Primary osteoarthritis of left knee 10/14/2018    PVD (peripheral vascular disease) ([address]     Tremor     Tremor on Right side x 1-2 weeks per stepdaughter    Type 2 diabetes mellitus with complication, without long-term current use of insulin ([address] 1/21/2021       Past Surgical History:  Past Surgical History:   Procedure Laterality Date    BACK SURGERY      COLONOSCOPY  2007?     CYSTOSCOPY Bilateral 12/19/2018    CYSTOSCOPY, BIOSPY FULGURATION OF BLADDER TUMOR POSSIBLE TURBT, RETROGRADE PYELOGRAM performed by Alda Garcia Zbigniew Chau MD at [address] [facility] FLX DX W/COLLJ SPEC WHEN PFRMD N/A 9/11/2017    Dr Conchita Nesbitt internal hemorrhoids, diverticular disease-HP-No recall (age)   Ivon Jimenez [place] REVISE MEDIAN N/CARPAL TUNNEL SURG Left 7/18/2018    OPEN CARPAL TUNNEL RELEASE performed by David Iglesias MD at [address] Left 8/28/2018    LEFT CAROTID ENDARTERECTOMY WITH VEIN PATCH ANGIOPLASTY AND COMPLETION ANGIOGRAM performed by Wing Keenan MD at [address] 10/15/2018    LEFT COMPLEX TOTAL KNEE ARTHROPLASTY performed by David Iglesias MD at [facility] VASCULAR SURGERY  04/21/2015    Jarvis BESS Ultrasound guided access of left common femoral artery. Aortogram.Diagnostic right lower extremity arteriogram.Radiologic supervision and interpretation.  VASCULAR SURGERY  01/13/2015    Jarvis Hickey M.D Atherectomy,angioplasty,and stenting of left superficial femoral artery.  VASCULAR SURGERY  03/11/2014    Jarvis Hickey M.D. Ultrasound-guided access of right common femoral artery. Aortogram.Left lower extremity arteriogram.Atherectomy and angioplasty of left superficial femoral artery. Radiologic supervision and interpretation.  VASCULAR SURGERY  01/18/2013    Jarvis BESS Aortogram.Multistation arteriogram right lower extremity. Laser atherectomy and angioplasty of right superficial femoral artery. Selective catheterization of right tibioperoneal trunk. Angioplasty of peroneal artery and tibioperoneal trunk.  VASCULAR SURGERY  10/30/2018    [facility]. Ultrasound guided cannulation of right internal vein. Placement of right internal jugular vein tunneled dialysis catheter [employer] xk 23cm tip to cuff    VASCULAR SURGERY  12/17/2018    [facility]. Removal of tunneled dilaysis catheter right internal jugular vein.        Family History  Family History   Problem Relation Age of Onset    Colon Cancer Father     Diabetes Brother     Colon Polyps Neg Hx     Liver Cancer Neg Hx     Liver Disease Neg Hx     Esophageal Cancer Neg Hx     Rectal Cancer Neg Hx     Stomach Cancer Neg Hx        Social History  Social History     Socioeconomic History    Marital status:      Spouse name: Not on file    Number of children: Not on file    Years of education: Not on file    Highest education level: Not on file   Occupational History    Not on file   Social Needs    Financial resource strain: Not on file    Food insecurity     Worry: Not on file     Inability: Not on file    Transportation needs     Medical: Not on file     Non-medical: Not on file   Tobacco Use    Smoking status: Former Smoker     Quit date: 6/3/2003     Years since quittin.7    Smokeless tobacco: Never Used   Substance and Sexual Activity    Alcohol use: Yes     Alcohol/week: 12.0 standard drinks     Types: 12 Glasses of wine per week     Comment: 2 glasses of wine every night    Drug use: No    Sexual activity: Yes     Partners: Female   Lifestyle    Physical activity     Days per week: Not on file     Minutes per session: Not on file    Stress: Not on file   Relationships    Social connections     Talks on phone: Not on file     Gets together: Not on file     Attends Mandaen service: Not on file     Active member of club or organization: Not on file     Attends meetings of clubs or organizations: Not on file     Relationship status: Not on file    Intimate partner violence     Fear of current or ex partner: Not on file     Emotionally abused: Not on file     Physically abused: Not on file     Forced sexual activity: Not on file   Other Topics Concern    Not on file   Social History Narrative    Not on file         Review of Systems:    Review of Systems   Constitutional: Negative for activity change and fever. HENT: Negative for congestion and voice change. Eyes: Negative for visual disturbance.    Respiratory: Negative for shortness of breath. Cardiovascular: Negative for chest pain and leg swelling. Gastrointestinal: Negative for constipation, diarrhea, nausea and vomiting. Endocrine: Negative for polyuria. Genitourinary: Negative for difficulty urinating and dysuria. Musculoskeletal: Positive for gait problem. Negative for back pain and neck pain. Skin: Negative for color change. Allergic/Immunologic: Negative for immunocompromised state. Neurological: Positive for weakness. Negative for dizziness and light-headedness. Psychiatric/Behavioral: The patient is not nervous/anxious. Objective:  Blood pressure 103/63, pulse 62, temperature 97 °F (36.1 °C), temperature source Temporal, resp. rate 18, height 6' (1.829 m), weight 250 lb (113.4 kg), SpO2 93 %. Intake/Output Summary (Last 24 hours) at 3/14/2021 1551  Last data filed at 3/14/2021 1359  Gross per 24 hour   Intake 1529 ml   Output 2535 ml   Net -1006 ml       Physical Exam  Vitals signs and nursing note reviewed. HENT:      Head: Normocephalic and atraumatic. Right Ear: External ear normal.      Left Ear: External ear normal.      Nose: Nose normal.      Mouth/Throat:      Mouth: Mucous membranes are moist.   Eyes:      Conjunctiva/sclera: Conjunctivae normal.      Pupils: Pupils are equal, round, and reactive to light. Neck:      Musculoskeletal: Neck supple. No neck rigidity. Cardiovascular:      Rate and Rhythm: Normal rate and regular rhythm. Heart sounds: Normal heart sounds. Pulmonary:      Effort: Pulmonary effort is normal.      Breath sounds: Normal breath sounds. Abdominal:      General: Abdomen is flat. Palpations: Abdomen is soft. Musculoskeletal:         General: No swelling. Skin:     General: Skin is warm and dry. Neurological:      Mental Status: He is oriented to person, place, and time. Psychiatric:         Mood and Affect: Mood normal.         Thought Content:  Thought content normal.         Labs:  BMP: Recent Labs     03/13/21  1040   CO2 26   CREATININE 1.5*     CBC:   Recent Labs     03/12/21  0350 03/12/21  1350 03/13/21  0240 03/13/21  1040 03/14/21  0613   WBC 7.4  --  9.7  --  8.5   HGB 6.3* 7.7* 7.6* 8.4* 7.0*   HCT 20.4* 24.6* 24.1*  --  23.7*   MCV 91.5  --  93.8  --  95.6*     --  135  --  114*     LIVER PROFILE: No results for input(s): AST, ALT, LIPASE, BILIDIR, BILITOT, ALKPHOS in the last 72 hours. Invalid input(s): AMYLASE,  ALB  PT/INR: No results for input(s): PROTIME, INR in the last 72 hours. APTT: No results for input(s): APTT in the last 72 hours. BNP:  No results for input(s): BNP in the last 72 hours. Ionized Calcium:No results for input(s): IONCA in the last 72 hours. Magnesium:No results for input(s): MG in the last 72 hours. Phosphorus:No results for input(s): PHOS in the last 72 hours. HgbA1C: No results for input(s): LABA1C in the last 72 hours. Hepatic: No results for input(s): ALKPHOS, ALT, AST, PROT, BILITOT, BILIDIR, LABALBU in the last 72 hours. Lactic Acid: No results for input(s): LACTA in the last 72 hours. Troponin: No results for input(s): CKTOTAL, CKMB, TROPONINT in the last 72 hours. ABGs: No results for input(s): PH, PCO2, PO2, HCO3, O2SAT in the last 72 hours. CRP:  No results for input(s): CRP in the last 72 hours. Sed Rate:  No results for input(s): SEDRATE in the last 72 hours. Cultures:   No results for input(s): CULTURE in the last 72 hours. No results for input(s): BC, Judye Kawasaki in the last 72 hours. No results for input(s): CXSURG in the last 72 hours. Radiology reports as per the Radiologist  Radiology: Xr Chest Portable    Result Date: 3/7/2021  Exam: XR CHEST PORTABLE - 3/7/2021 12:14 PM Indication: Central line placement Comparison: 3/6/2021 Findings: CVL tip overlies the SVC. Cardiac silhouette is stable. No significant change in bilateral interstitial and airspace opacity, greatest in the RIGHT lower lobe.  No visible pneumothorax. No acute osseous finding. Impression: 1. CVL tip overlies the SVC. 2.  No significant change in bilateral interstitial and airspace opacity, greatest in the RIGHT lower lung. Signed by Dr Zeeshan Sanchez on 3/7/2021 1:36 PM    Xr Chest Portable    Result Date: 3/6/2021  Exam: XR CHEST PORTABLE - 3/6/2021 11:52 AM Indication: Shortness of air Comparison: 2/12/2021 Findings: Persistent and slightly increased RIGHT greater than LEFT middle and lower lung zone airspace opacity. No large pleural effusion. No pneumothorax. Cardiac silhouette is stable. No acute osseous finding. Impression: Slight increase in bilateral RIGHT greater than LEFT middle and lower lung zone airspace opacity. Signed by Dr Zeeshan Sanchez on 3/6/2021 1:14 PM    Cta Pulmonary W Contrast    Result Date: 3/6/2021  Exam: CT chest angiography with 3D MIP images with IV contrast - 3/6/2021 12:33 PM Indication: Worsening respiratory function, history of pneumonia Comparison: 2/13/2021 DLP: 927 mGy cm. In order to have a CT radiation dose as low as reasonably achievable, Automated Exposure Control was utilized for adjustment of the mA and/or KV according to patient size. Findings: No evidence of pulmonary embolus. Main pulmonary artery is upper limits of normal in caliber. Thoracic aorta is nonaneurysmal. Atherosclerotic change in the aortic arch and coronary arteries noted. No pericardial effusion. Central airways are clear. Interlobular septal thickening. Slight interval worsening of multifocal bilateral consolidation and groundglass opacity. Disease burden is greatest in the RIGHT upper lobe. Small RIGHT greater than LEFT pleural effusions with adjacent atelectasis. No pneumothorax. Mild mediastinal lymphadenopathy similar to prior and likely reactive. 1.3 cm RIGHT thyroid nodule. No acute chest wall soft tissue abnormality. No acute osseous finding. Thoracic spine is scoliotic curvature and degenerative change.  No acute osseous finding. Impression: 1. No evidence of pulmonary embolus. 2.  Slight progression of bilateral RIGHT greater than LEFT consolidation, suspicious for pneumonia. 3.  Small bilateral pleural effusions and interstitial pulmonary edema. Signed by Dr Bhumika Victor on 3/6/2021 1:56 PM       Assessment     Symptomatic bradycardia. Status post pacemaker insertion.     Acute on chronic combined hypoxemic and hypercapnic respiratory failure. Continue undergoing medical management. Debilitation. Ask inpatient rehab for evaluation.       Ana Luisa Townsend,

## 2021-03-15 ENCOUNTER — HOSPITAL ENCOUNTER (INPATIENT)
Age: 81
LOS: 14 days | Discharge: HOME | DRG: 189 | End: 2021-03-29
Attending: PSYCHIATRY & NEUROLOGY | Admitting: PSYCHIATRY & NEUROLOGY
Payer: MEDICARE

## 2021-03-15 VITALS
DIASTOLIC BLOOD PRESSURE: 54 MMHG | RESPIRATION RATE: 18 BRPM | HEIGHT: 72 IN | OXYGEN SATURATION: 91 % | TEMPERATURE: 97.3 F | SYSTOLIC BLOOD PRESSURE: 101 MMHG | WEIGHT: 250 LBS | BODY MASS INDEX: 33.86 KG/M2 | HEART RATE: 60 BPM

## 2021-03-15 DIAGNOSIS — J96.20 ACUTE ON CHRONIC RESPIRATORY FAILURE, UNSPECIFIED WHETHER WITH HYPOXIA OR HYPERCAPNIA (HCC): Primary | ICD-10-CM

## 2021-03-15 LAB
ALBUMIN SERPL-MCNC: 3 G/DL (ref 3.5–5.2)
ALP BLD-CCNC: 81 U/L (ref 40–130)
ALT SERPL-CCNC: 25 U/L (ref 5–41)
ANION GAP SERPL CALCULATED.3IONS-SCNC: 9 MMOL/L (ref 7–19)
AST SERPL-CCNC: 14 U/L (ref 5–40)
BASOPHILS ABSOLUTE: 0 K/UL (ref 0–0.2)
BASOPHILS RELATIVE PERCENT: 0.2 % (ref 0–1)
BILIRUB SERPL-MCNC: 0.5 MG/DL (ref 0.2–1.2)
BUN BLDV-MCNC: 51 MG/DL (ref 8–23)
CALCIUM SERPL-MCNC: 8.6 MG/DL (ref 8.8–10.2)
CHLORIDE BLD-SCNC: 105 MMOL/L (ref 98–111)
CO2: 25 MMOL/L (ref 22–29)
CREAT SERPL-MCNC: 1.5 MG/DL (ref 0.5–1.2)
EKG P AXIS: 11 DEGREES
EKG P-R INTERVAL: 186 MS
EKG Q-T INTERVAL: 388 MS
EKG QRS DURATION: 120 MS
EKG QTC CALCULATION (BAZETT): 400 MS
EKG T AXIS: 70 DEGREES
EOSINOPHILS ABSOLUTE: 0 K/UL (ref 0–0.6)
EOSINOPHILS RELATIVE PERCENT: 0 % (ref 0–5)
FERRITIN: 135 NG/ML (ref 30–400)
FOLATE: 3.6 NG/ML (ref 4.5–32.2)
GFR AFRICAN AMERICAN: 55
GFR NON-AFRICAN AMERICAN: 45
GLUCOSE BLD-MCNC: 179 MG/DL (ref 70–99)
GLUCOSE BLD-MCNC: 185 MG/DL (ref 74–109)
GLUCOSE BLD-MCNC: 222 MG/DL (ref 70–99)
GLUCOSE BLD-MCNC: 256 MG/DL (ref 70–99)
GLUCOSE BLD-MCNC: 274 MG/DL (ref 70–99)
HCT VFR BLD CALC: 24 % (ref 42–52)
HEMOGLOBIN: 7.2 G/DL (ref 14–18)
IMMATURE GRANULOCYTES #: 0.6 K/UL
INR BLD: 1.11 (ref 0.88–1.18)
IRON SATURATION: 34 % (ref 14–50)
IRON: 58 UG/DL (ref 59–158)
LYMPHOCYTES ABSOLUTE: 1 K/UL (ref 1.1–4.5)
LYMPHOCYTES RELATIVE PERCENT: 10.6 % (ref 20–40)
MAGNESIUM: 1.9 MG/DL (ref 1.6–2.4)
MCH RBC QN AUTO: 28.6 PG (ref 27–31)
MCHC RBC AUTO-ENTMCNC: 30 G/DL (ref 33–37)
MCV RBC AUTO: 95.2 FL (ref 80–94)
MONOCYTES ABSOLUTE: 0.9 K/UL (ref 0–0.9)
MONOCYTES RELATIVE PERCENT: 9.9 % (ref 0–10)
NEUTROPHILS ABSOLUTE: 6.5 K/UL (ref 1.5–7.5)
NEUTROPHILS RELATIVE PERCENT: 72.5 % (ref 50–65)
PDW BLD-RTO: 15.7 % (ref 11.5–14.5)
PERFORMED ON: ABNORMAL
PLATELET # BLD: 115 K/UL (ref 130–400)
PMV BLD AUTO: 12.5 FL (ref 9.4–12.4)
POTASSIUM SERPL-SCNC: 4.8 MMOL/L (ref 3.5–5)
PREALBUMIN: 23 MG/DL (ref 20–40)
PROTHROMBIN TIME: 14.3 SEC (ref 12–14.6)
RBC # BLD: 2.52 M/UL (ref 4.7–6.1)
SARS-COV-2, NAAT: NOT DETECTED
SODIUM BLD-SCNC: 139 MMOL/L (ref 136–145)
TOTAL IRON BINDING CAPACITY: 172 UG/DL (ref 250–400)
TOTAL PROTEIN: 5 G/DL (ref 6.6–8.7)
TSH REFLEX FT4: 2.68 UIU/ML (ref 0.35–5.5)
TSH SERPL DL<=0.05 MIU/L-ACNC: 3.24 UIU/ML (ref 0.27–4.2)
VITAMIN B-12: 377 PG/ML (ref 211–946)
VITAMIN B-12: 405 PG/ML (ref 211–946)
VITAMIN D 25-HYDROXY: 28.5 NG/ML
WBC # BLD: 8.9 K/UL (ref 4.8–10.8)

## 2021-03-15 PROCEDURE — 97116 GAIT TRAINING THERAPY: CPT

## 2021-03-15 PROCEDURE — 6370000000 HC RX 637 (ALT 250 FOR IP): Performed by: HOSPITALIST

## 2021-03-15 PROCEDURE — 51798 US URINE CAPACITY MEASURE: CPT

## 2021-03-15 PROCEDURE — 2700000000 HC OXYGEN THERAPY PER DAY

## 2021-03-15 PROCEDURE — 80053 COMPREHEN METABOLIC PANEL: CPT

## 2021-03-15 PROCEDURE — 97530 THERAPEUTIC ACTIVITIES: CPT

## 2021-03-15 PROCEDURE — 84134 ASSAY OF PREALBUMIN: CPT

## 2021-03-15 PROCEDURE — 97161 PT EVAL LOW COMPLEX 20 MIN: CPT

## 2021-03-15 PROCEDURE — 82607 VITAMIN B-12: CPT

## 2021-03-15 PROCEDURE — 82728 ASSAY OF FERRITIN: CPT

## 2021-03-15 PROCEDURE — 85025 COMPLETE CBC W/AUTO DIFF WBC: CPT

## 2021-03-15 PROCEDURE — 82947 ASSAY GLUCOSE BLOOD QUANT: CPT

## 2021-03-15 PROCEDURE — 87635 SARS-COV-2 COVID-19 AMP PRB: CPT

## 2021-03-15 PROCEDURE — 2500000003 HC RX 250 WO HCPCS: Performed by: HOSPITALIST

## 2021-03-15 PROCEDURE — 84443 ASSAY THYROID STIM HORMONE: CPT

## 2021-03-15 PROCEDURE — 93010 ELECTROCARDIOGRAM REPORT: CPT | Performed by: INTERNAL MEDICINE

## 2021-03-15 PROCEDURE — 82746 ASSAY OF FOLIC ACID SERUM: CPT

## 2021-03-15 PROCEDURE — 83540 ASSAY OF IRON: CPT

## 2021-03-15 PROCEDURE — 1180000000 HC REHAB R&B

## 2021-03-15 PROCEDURE — 6360000002 HC RX W HCPCS: Performed by: PSYCHIATRY & NEUROLOGY

## 2021-03-15 PROCEDURE — 85610 PROTHROMBIN TIME: CPT

## 2021-03-15 PROCEDURE — 83550 IRON BINDING TEST: CPT

## 2021-03-15 PROCEDURE — 6370000000 HC RX 637 (ALT 250 FOR IP): Performed by: INTERNAL MEDICINE

## 2021-03-15 PROCEDURE — 36415 COLL VENOUS BLD VENIPUNCTURE: CPT

## 2021-03-15 PROCEDURE — 97165 OT EVAL LOW COMPLEX 30 MIN: CPT

## 2021-03-15 PROCEDURE — 92526 ORAL FUNCTION THERAPY: CPT

## 2021-03-15 PROCEDURE — 82306 VITAMIN D 25 HYDROXY: CPT

## 2021-03-15 PROCEDURE — 83735 ASSAY OF MAGNESIUM: CPT

## 2021-03-15 PROCEDURE — 2580000003 HC RX 258: Performed by: INTERNAL MEDICINE

## 2021-03-15 PROCEDURE — 94640 AIRWAY INHALATION TREATMENT: CPT

## 2021-03-15 RX ORDER — NICOTINE POLACRILEX 4 MG
15 LOZENGE BUCCAL PRN
Status: CANCELLED | OUTPATIENT
Start: 2021-03-15

## 2021-03-15 RX ORDER — TAMSULOSIN HYDROCHLORIDE 0.4 MG/1
0.4 CAPSULE ORAL 2 TIMES DAILY
Status: CANCELLED | OUTPATIENT
Start: 2021-03-15

## 2021-03-15 RX ORDER — PANTOPRAZOLE SODIUM 40 MG/1
40 TABLET, DELAYED RELEASE ORAL DAILY
Status: DISCONTINUED | OUTPATIENT
Start: 2021-03-16 | End: 2021-03-29 | Stop reason: HOSPADM

## 2021-03-15 RX ORDER — ACETAMINOPHEN 325 MG/1
650 TABLET ORAL EVERY 6 HOURS PRN
Status: DISCONTINUED | OUTPATIENT
Start: 2021-03-15 | End: 2021-03-15

## 2021-03-15 RX ORDER — ONDANSETRON 2 MG/ML
4 INJECTION INTRAMUSCULAR; INTRAVENOUS EVERY 6 HOURS PRN
Status: DISCONTINUED | OUTPATIENT
Start: 2021-03-15 | End: 2021-03-29 | Stop reason: HOSPADM

## 2021-03-15 RX ORDER — PREDNISONE 1 MG/1
5 TABLET ORAL DAILY
Status: COMPLETED | OUTPATIENT
Start: 2021-03-18 | End: 2021-03-20

## 2021-03-15 RX ORDER — FOLIC ACID 1 MG/1
1 TABLET ORAL DAILY
Status: CANCELLED | OUTPATIENT
Start: 2021-03-16

## 2021-03-15 RX ORDER — POLYETHYLENE GLYCOL 3350 17 G/17G
17 POWDER, FOR SOLUTION ORAL DAILY PRN
Status: DISCONTINUED | OUTPATIENT
Start: 2021-03-15 | End: 2021-03-29 | Stop reason: HOSPADM

## 2021-03-15 RX ORDER — NIFEDIPINE 60 MG/1
60 TABLET, EXTENDED RELEASE ORAL DAILY
Status: DISCONTINUED | OUTPATIENT
Start: 2021-03-16 | End: 2021-03-29 | Stop reason: HOSPADM

## 2021-03-15 RX ORDER — CALCIUM CARBONATE 200(500)MG
500 TABLET,CHEWABLE ORAL 3 TIMES DAILY PRN
Status: DISCONTINUED | OUTPATIENT
Start: 2021-03-15 | End: 2021-03-29 | Stop reason: HOSPADM

## 2021-03-15 RX ORDER — DEXTROSE MONOHYDRATE 50 MG/ML
100 INJECTION, SOLUTION INTRAVENOUS PRN
Status: CANCELLED | OUTPATIENT
Start: 2021-03-15

## 2021-03-15 RX ORDER — CALCIUM CARBONATE 200(500)MG
500 TABLET,CHEWABLE ORAL 3 TIMES DAILY PRN
Status: CANCELLED | OUTPATIENT
Start: 2021-03-15

## 2021-03-15 RX ORDER — SODIUM CHLORIDE 0.9 % (FLUSH) 0.9 %
10 SYRINGE (ML) INJECTION PRN
Status: DISCONTINUED | OUTPATIENT
Start: 2021-03-15 | End: 2021-03-29 | Stop reason: HOSPADM

## 2021-03-15 RX ORDER — ERGOCALCIFEROL 1.25 MG/1
50000 CAPSULE ORAL WEEKLY
Status: DISCONTINUED | OUTPATIENT
Start: 2021-03-22 | End: 2021-03-29 | Stop reason: HOSPADM

## 2021-03-15 RX ORDER — ONDANSETRON 2 MG/ML
4 INJECTION INTRAMUSCULAR; INTRAVENOUS EVERY 6 HOURS PRN
Status: CANCELLED | OUTPATIENT
Start: 2021-03-15

## 2021-03-15 RX ORDER — NIFEDIPINE 60 MG/1
60 TABLET, EXTENDED RELEASE ORAL DAILY
Status: CANCELLED | OUTPATIENT
Start: 2021-03-16

## 2021-03-15 RX ORDER — FERROUS SULFATE 325(65) MG
325 TABLET ORAL 2 TIMES DAILY WITH MEALS
Status: CANCELLED | OUTPATIENT
Start: 2021-03-15

## 2021-03-15 RX ORDER — ERGOCALCIFEROL 1.25 MG/1
50000 CAPSULE ORAL WEEKLY
Status: DISCONTINUED | OUTPATIENT
Start: 2021-03-15 | End: 2021-03-15 | Stop reason: HOSPADM

## 2021-03-15 RX ORDER — SODIUM CHLORIDE 0.9 % (FLUSH) 0.9 %
10 SYRINGE (ML) INJECTION PRN
Status: CANCELLED | OUTPATIENT
Start: 2021-03-15

## 2021-03-15 RX ORDER — SODIUM CHLORIDE 0.9 % (FLUSH) 0.9 %
10 SYRINGE (ML) INJECTION EVERY 12 HOURS SCHEDULED
Status: DISCONTINUED | OUTPATIENT
Start: 2021-03-15 | End: 2021-03-15

## 2021-03-15 RX ORDER — IPRATROPIUM BROMIDE AND ALBUTEROL SULFATE 2.5; .5 MG/3ML; MG/3ML
1 SOLUTION RESPIRATORY (INHALATION) EVERY 4 HOURS PRN
Status: CANCELLED | OUTPATIENT
Start: 2021-03-15

## 2021-03-15 RX ORDER — ATORVASTATIN CALCIUM 20 MG/1
20 TABLET, FILM COATED ORAL DAILY
Status: CANCELLED | OUTPATIENT
Start: 2021-03-16

## 2021-03-15 RX ORDER — FOLIC ACID 1 MG/1
1 TABLET ORAL DAILY
Status: DISCONTINUED | OUTPATIENT
Start: 2021-03-15 | End: 2021-03-15 | Stop reason: HOSPADM

## 2021-03-15 RX ORDER — AMIODARONE HYDROCHLORIDE 200 MG/1
200 TABLET ORAL 2 TIMES DAILY
Status: CANCELLED | OUTPATIENT
Start: 2021-03-15

## 2021-03-15 RX ORDER — FOLIC ACID 1 MG/1
1 TABLET ORAL DAILY
Status: DISCONTINUED | OUTPATIENT
Start: 2021-03-16 | End: 2021-03-29 | Stop reason: HOSPADM

## 2021-03-15 RX ORDER — AMIODARONE HYDROCHLORIDE 200 MG/1
200 TABLET ORAL 2 TIMES DAILY
Status: DISCONTINUED | OUTPATIENT
Start: 2021-03-15 | End: 2021-03-29 | Stop reason: HOSPADM

## 2021-03-15 RX ORDER — ACETAMINOPHEN 325 MG/1
650 TABLET ORAL EVERY 6 HOURS PRN
Status: CANCELLED | OUTPATIENT
Start: 2021-03-15

## 2021-03-15 RX ORDER — METOPROLOL SUCCINATE 50 MG/1
100 TABLET, EXTENDED RELEASE ORAL DAILY
Status: CANCELLED | OUTPATIENT
Start: 2021-03-16

## 2021-03-15 RX ORDER — DEXTROSE MONOHYDRATE 25 G/50ML
12.5 INJECTION, SOLUTION INTRAVENOUS PRN
Status: CANCELLED | OUTPATIENT
Start: 2021-03-15

## 2021-03-15 RX ORDER — FERROUS SULFATE 325(65) MG
325 TABLET ORAL 2 TIMES DAILY WITH MEALS
Status: DISCONTINUED | OUTPATIENT
Start: 2021-03-15 | End: 2021-03-15 | Stop reason: HOSPADM

## 2021-03-15 RX ORDER — PREDNISONE 10 MG/1
10 TABLET ORAL DAILY
Status: CANCELLED | OUTPATIENT
Start: 2021-03-16 | End: 2021-03-18

## 2021-03-15 RX ORDER — ERGOCALCIFEROL 1.25 MG/1
50000 CAPSULE ORAL WEEKLY
Status: CANCELLED | OUTPATIENT
Start: 2021-03-22

## 2021-03-15 RX ORDER — FERROUS SULFATE 325(65) MG
325 TABLET ORAL 2 TIMES DAILY WITH MEALS
Status: DISCONTINUED | OUTPATIENT
Start: 2021-03-15 | End: 2021-03-29 | Stop reason: HOSPADM

## 2021-03-15 RX ORDER — SODIUM CHLORIDE 0.9 % (FLUSH) 0.9 %
10 SYRINGE (ML) INJECTION EVERY 12 HOURS SCHEDULED
Status: CANCELLED | OUTPATIENT
Start: 2021-03-15

## 2021-03-15 RX ORDER — TAMSULOSIN HYDROCHLORIDE 0.4 MG/1
0.4 CAPSULE ORAL 2 TIMES DAILY
Status: DISCONTINUED | OUTPATIENT
Start: 2021-03-15 | End: 2021-03-29 | Stop reason: HOSPADM

## 2021-03-15 RX ORDER — DEXTROSE MONOHYDRATE 50 MG/ML
100 INJECTION, SOLUTION INTRAVENOUS PRN
Status: DISCONTINUED | OUTPATIENT
Start: 2021-03-15 | End: 2021-03-29 | Stop reason: HOSPADM

## 2021-03-15 RX ORDER — NICOTINE POLACRILEX 4 MG
15 LOZENGE BUCCAL PRN
Status: DISCONTINUED | OUTPATIENT
Start: 2021-03-15 | End: 2021-03-29 | Stop reason: HOSPADM

## 2021-03-15 RX ORDER — ACETAMINOPHEN 650 MG/1
650 SUPPOSITORY RECTAL EVERY 6 HOURS PRN
Status: DISCONTINUED | OUTPATIENT
Start: 2021-03-15 | End: 2021-03-29 | Stop reason: HOSPADM

## 2021-03-15 RX ORDER — PREDNISONE 10 MG/1
5 TABLET ORAL DAILY
Status: CANCELLED | OUTPATIENT
Start: 2021-03-18 | End: 2021-03-21

## 2021-03-15 RX ORDER — ATORVASTATIN CALCIUM 20 MG/1
20 TABLET, FILM COATED ORAL DAILY
Status: DISCONTINUED | OUTPATIENT
Start: 2021-03-16 | End: 2021-03-29 | Stop reason: HOSPADM

## 2021-03-15 RX ORDER — IPRATROPIUM BROMIDE AND ALBUTEROL SULFATE 2.5; .5 MG/3ML; MG/3ML
1 SOLUTION RESPIRATORY (INHALATION) EVERY 4 HOURS PRN
Status: DISCONTINUED | OUTPATIENT
Start: 2021-03-15 | End: 2021-03-29 | Stop reason: HOSPADM

## 2021-03-15 RX ORDER — PANTOPRAZOLE SODIUM 40 MG/1
40 TABLET, DELAYED RELEASE ORAL DAILY
Status: CANCELLED | OUTPATIENT
Start: 2021-03-16

## 2021-03-15 RX ORDER — DEXTROSE MONOHYDRATE 25 G/50ML
12.5 INJECTION, SOLUTION INTRAVENOUS PRN
Status: DISCONTINUED | OUTPATIENT
Start: 2021-03-15 | End: 2021-03-29 | Stop reason: HOSPADM

## 2021-03-15 RX ORDER — METOPROLOL SUCCINATE 50 MG/1
100 TABLET, EXTENDED RELEASE ORAL DAILY
Status: DISCONTINUED | OUTPATIENT
Start: 2021-03-16 | End: 2021-03-29 | Stop reason: HOSPADM

## 2021-03-15 RX ORDER — PREDNISONE 20 MG/1
10 TABLET ORAL DAILY
Status: COMPLETED | OUTPATIENT
Start: 2021-03-16 | End: 2021-03-17

## 2021-03-15 RX ORDER — ASPIRIN 81 MG/1
81 TABLET ORAL DAILY
Status: DISCONTINUED | OUTPATIENT
Start: 2021-03-16 | End: 2021-03-29 | Stop reason: HOSPADM

## 2021-03-15 RX ORDER — ASPIRIN 81 MG/1
81 TABLET ORAL DAILY
Status: CANCELLED | OUTPATIENT
Start: 2021-03-16

## 2021-03-15 RX ORDER — ACETAMINOPHEN 325 MG/1
650 TABLET ORAL EVERY 4 HOURS PRN
Status: DISCONTINUED | OUTPATIENT
Start: 2021-03-15 | End: 2021-03-29 | Stop reason: HOSPADM

## 2021-03-15 RX ORDER — ACETAMINOPHEN 650 MG/1
650 SUPPOSITORY RECTAL EVERY 6 HOURS PRN
Status: CANCELLED | OUTPATIENT
Start: 2021-03-15

## 2021-03-15 RX ADMIN — ENOXAPARIN SODIUM 40 MG: 40 INJECTION SUBCUTANEOUS at 17:01

## 2021-03-15 RX ADMIN — BISACODYL 5 MG: 5 TABLET, COATED ORAL at 12:43

## 2021-03-15 RX ADMIN — IPRATROPIUM BROMIDE AND ALBUTEROL SULFATE 1 AMPULE: 2.5; .5 SOLUTION RESPIRATORY (INHALATION) at 01:59

## 2021-03-15 RX ADMIN — ERGOCALCIFEROL 50000 UNITS: 1.25 CAPSULE ORAL at 12:43

## 2021-03-15 RX ADMIN — SODIUM CHLORIDE, PRESERVATIVE FREE 10 ML: 5 INJECTION INTRAVENOUS at 08:24

## 2021-03-15 RX ADMIN — MICONAZOLE NITRATE: 2 POWDER TOPICAL at 08:24

## 2021-03-15 RX ADMIN — AMIODARONE HYDROCHLORIDE 200 MG: 200 TABLET ORAL at 21:14

## 2021-03-15 RX ADMIN — MICONAZOLE NITRATE: 2 POWDER TOPICAL at 21:21

## 2021-03-15 RX ADMIN — INSULIN LISPRO 1 UNITS: 100 INJECTION, SOLUTION INTRAVENOUS; SUBCUTANEOUS at 09:53

## 2021-03-15 RX ADMIN — TAMSULOSIN HYDROCHLORIDE 0.4 MG: 0.4 CAPSULE ORAL at 21:14

## 2021-03-15 RX ADMIN — IPRATROPIUM BROMIDE AND ALBUTEROL SULFATE 1 AMPULE: 2.5; .5 SOLUTION RESPIRATORY (INHALATION) at 11:15

## 2021-03-15 RX ADMIN — ASPIRIN 81 MG: 81 TABLET, FILM COATED ORAL at 08:24

## 2021-03-15 RX ADMIN — INSULIN LISPRO 2 UNITS: 100 INJECTION, SOLUTION INTRAVENOUS; SUBCUTANEOUS at 12:43

## 2021-03-15 RX ADMIN — PANTOPRAZOLE SODIUM 40 MG: 40 TABLET, DELAYED RELEASE ORAL at 08:24

## 2021-03-15 RX ADMIN — INSULIN LISPRO 3 UNITS: 100 INJECTION, SOLUTION INTRAVENOUS; SUBCUTANEOUS at 17:01

## 2021-03-15 RX ADMIN — IPRATROPIUM BROMIDE AND ALBUTEROL SULFATE 1 AMPULE: 2.5; .5 SOLUTION RESPIRATORY (INHALATION) at 07:13

## 2021-03-15 RX ADMIN — PREDNISONE 10 MG: 20 TABLET ORAL at 08:24

## 2021-03-15 RX ADMIN — TAMSULOSIN HYDROCHLORIDE 0.4 MG: 0.4 CAPSULE ORAL at 08:24

## 2021-03-15 RX ADMIN — FERROUS SULFATE TAB 325 MG (65 MG ELEMENTAL FE) 325 MG: 325 (65 FE) TAB at 17:01

## 2021-03-15 RX ADMIN — ATORVASTATIN CALCIUM 20 MG: 20 TABLET, FILM COATED ORAL at 08:24

## 2021-03-15 RX ADMIN — FOLIC ACID 1 MG: 1 TABLET ORAL at 12:43

## 2021-03-15 RX ADMIN — AMIODARONE HYDROCHLORIDE 200 MG: 200 TABLET ORAL at 08:24

## 2021-03-15 NOTE — PROGRESS NOTES
Occupational Therapy   Occupational Therapy Initial Assessment  Date: 3/15/2021   Patient Name: Teodora Thorne  MRN: [id number]     : 1940    Date of Service: 3/15/2021    Discharge Recommendations:          Assessment   Performance deficits / Impairments: Decreased functional mobility ; Decreased endurance;Decreased strength;Decreased ROM; Decreased balance;Decreased ADL status  Assessment: Pt. would be good candidate for rehab unit as he is motivated and was independent with ADLs prior to admission. Treatment Diagnosis: Pneumonia, recent pacemaker (3-13-21)  Prognosis: Good  Decision Making: Low Complexity  REQUIRES OT FOLLOW UP: Yes  Activity Tolerance  Activity Tolerance: Patient Tolerated treatment well           Patient Diagnosis(es): The primary encounter diagnosis was Acute on chronic respiratory failure with hypoxia and hypercapnia ([address] Diagnoses of Pneumonia due to organism, NSVT (nonsustained ventricular tachycardia) ([address] and Recurrent pneumonia were also pertinent to this visit. has a past medical history of Acute liver failure without hepatic coma, Back pain, Bladder cancer (HCC), Blood circulation, collateral, Carotid arterial disease ([address] CKD (chronic kidney disease), stage II, COPD with acute lower respiratory infection ([address] GERD (gastroesophageal reflux disease), Hyperlipidemia, Hypertension, Hypertension, Palliative care patient, Pneumonia due to infectious organism, Primary osteoarthritis of left knee, PVD (peripheral vascular disease) ([address] Tremor, and Type 2 diabetes mellitus with complication, without long-term current use of insulin ([address] has a past surgical history that includes back surgery;  Tonsillectomy and adenoidectomy; Colonoscopy (?); pr colonoscopy flx dx w/collj spec when pfrmd (N/A, 2017); pr revise median n/carpal tunnel surg (Left, 2018); pr thromboendartectmy neck,neck incis (Left, 2018); vascular surgery (2015); vascular surgery (01/13/2015); vascular surgery (03/11/2014); vascular surgery (01/18/2013); pr total knee arthroplasty (Left, 10/15/2018); vascular surgery (10/30/2018); vascular surgery (12/17/2018); and Cystoscopy (Bilateral, 12/19/2018). Treatment Diagnosis: Pneumonia, recent pacemaker (3-13-21)      Restrictions  Restrictions/Precautions  Restrictions/Precautions: Surgical Protocols  Implants present? : Pacemaker    Subjective   General  Chart Reviewed: Yes  Patient assessed for rehabilitation services?: Yes  Additional Pertinent Hx: Chronic low back pain  Family / Caregiver Present: No  Diagnosis: Pneumonia, recent pacemaker (3-13-21)  Patient Currently in Pain: Denies(Simultaneous filing. User may not have seen previous data.)  Vital Signs  Temp: 97.3 °F (36.3 °C)  Pulse: 60  Resp: 18  BP: (!) 101/54  Level of Consciousness: Alert (0)  Patient Currently in Pain: Denies(Simultaneous filing.  User may not have seen previous data.)  Oxygen Therapy  SpO2: 91 %  Social/Functional History  Social/Functional History  Lives With: Spouse  Type of Home: House  ADL Assistance: Independent  Ambulation Assistance: Independent  Transfer Assistance: Independent  Active : Yes       Objective   Vision: Within Functional Limits  Hearing: Within functional limits    Orientation  Overall Orientation Status: Within Normal Limits        ADL  Feeding: Independent  Grooming: Independent  UE Bathing: Supervision  LE Bathing: Minimal assistance        Bed mobility  Supine to Sit: Minimal assistance;Contact guard assistance  Transfers  Stand Step Transfers: Contact guard assistance  Sit to stand: Contact guard assistance  Transfer Comments: RW and O2 nasal.  Fatigues quickly     Cognition  Overall Cognitive Status: WNL                 LUE AROM (degrees)  LUE AROM : WFL  LUE General AROM: L shld limited to 90 deg elevaiton due to recent pacemaker  RUE AROM (degrees)  RUE AROM : WNL  LUE Strength  Gross LUE Strength: Exceptions to [facility] Shoulder Flex: 3/5  L Shoulder Ext: 3/5  L Elbow Flex: 4-/5  L Elbow Ext: 4-/5  RUE Strength  Gross RUE Strength: WFL                   Plan   Plan  Times per week: 3-5x/week  Times per day: Daily    G-Code     OutComes Score                                                  AM-PAC Score             Goals  Short term goals  Time Frame for Short term goals: 1 week  Short term goal 1: Supervision with toilet tfers  Short term goal 2: Supervision with LB dsg tasks  Short term goal 3: Supervision with light ambulatory ADLs without shortness of breath  Long term goals  Long term goal 1: Return to PLOF       Therapy Time   Individual Concurrent Group Co-treatment   Time In           Time Out           Minutes                   Jacey Marie, OT

## 2021-03-15 NOTE — PROGRESS NOTES
Visited with pt to provide spiritual care. Pt says he is coming to the rehab floor today, saying he was able to stand up after being in the bed for eight days. Provided spiritual care with sustaining presence, nurtured hope, and prayer. Pt expressed gratitude for spiritual care.     Electronically signed by Zaina Mcclure on 3/15/2021 at 2:01 PM

## 2021-03-15 NOTE — CARE COORDINATION
Date / Time of Evaluation: 3/15/2021 10:25 AM  Assessment Completed by: Zenaida Chaney    Patient Admission Status: Inpatient [101]    [address] [address]    [phone] (home)   Telephone Information:   Mobile [phone]       (Best Practice:  Have patient / caregiver verify above address and phone number by stating out loud their current address and reachable phone number.)  Is above information correct? yes      Current PCP:  Jose Mas MD  PCP verified? yes    Initial Assessment Completed at bedside with:  patient    Emergency Contacts:  Extended Emergency Contact Information  [facility] Phone: [phone]  Relation: Child  Secondary Emergency Contact: [facility]  Address: [address] [place] [address] Phone: [phone]  Mobile Phone: [phone]  Relation: Spouse    Advance Directives: Code Status:  Full Code    Financial:  Payor: MEDICARE / Plan: [id number] MEDICARE / Product Type: *No Product type* /     Pre-Cert required for SNF:  no    Have Long Term Care Insurance:  no    Pharmacy:   [facility] #[id number], [address] [phone] - F [phone]  [address] [address] [facility] [address]  Phone: [phone] Fax: [phone]    [address] - P [phone] [facility] [address] [address]  Phone: [phone] Fax: [phone]      Potential assistance purchasing medications?   no    ADLS:  Support System:  Spouse/Significant Other    Current Home Environment:  Home with spouse      Plans to RETURN to current housing: yes  Barriers to RETURNING to current housing:  weakness    Currently ACTIVE with Home Health CARE:  yes  [facility]:  [facility]    DME Provider:  Juan Daniel    Has a pulse oximetry unit at home: yes    Had HOME OXYGEN prior to admission:  yes  [address]:  [facility]  Informed of need to bring portable home O2 tank to hospital on day of DISCHARGE:  yes  Name of person committed to bringing portable tank at discharge:    [facility] with HD/PD prior to admission: no      Transition Plan:  [facility]- attempting trilogy @dc; pta home- spouse- current [facility]; estb nocturnal home O2([facility]); Readmit; prev hosp 1/17-21/2021(sepsis) & 1/21-29/2021(acute rehab); 2/12-24/2021 (sepsis)    Transportation PLAN for Discharge:  Spouse will  at discharge      Additional CM/SW Notes: awaiting PT/OT notes for rehab consult. Spoke with patient at length he has had 3 admissions within a month and he states that he would like to be a little stronger this time when discharge home. He declines SNF or any other needs at home.       Jasper Francois RN  [facility]  Care Management Department  Ph:  [phone]   Fax: [phone]

## 2021-03-15 NOTE — PROGRESS NOTES
Speech Language Pathology  Facility/Department: [facility] PROGRESSIVE CARE  SWALLOW THERAPY     NAME: Aileen Del Real  : 1940  MRN: [id number]    ADMISSION DATE: 3/6/2021  ADMITTING DIAGNOSIS: has Diverticulitis of large intestine with perforation without bleeding; Generalized abdominal pain; Colonic diverticular abscess; Bilateral carotid artery stenosis; Atherosclerosis of native artery of both lower extremities with intermittent claudication ([address] Carotid stenosis, asymptomatic, left; Primary osteoarthritis of left knee; Arthritis of knee; Essential hypertension; Pure hypercholesterolemia; Slow transit constipation; Iron deficiency anemia; GERD (gastroesophageal reflux disease); Acute liver failure without hepatic coma; Acute kidney injury [facility]); CHF (congestive heart failure) ([address] Bilateral pleural effusion; Elevated troponin; Palliative care patient; Shock liver; Acute renal failure (HCC); BPH associated with nocturia; Bleeding diathesis ([address] Epistaxis; Acute blood loss anemia; Melena; Hypocalcemia; Pneumonia due to infectious organism; Bladder cancer ([address] Postoperative pain; History of bladder cancer; Severe sepsis ([address] Acute on chronic respiratory failure ([address] Acute on chronic kidney failure ([address] Hypoxemia; Metabolic acidosis; Acidosis, metabolic, with respiratory acidosis; Acute respiratory failure ([address] Type 2 diabetes mellitus with complication, without long-term current use of insulin ([address] Weakness; Other dysphagia; Low back pain; COPD with acute lower respiratory infection ([address] CKD (chronic kidney disease) stage 3, GFR 30-59 ml/min;  Recurrent pneumonia; and Nonsustained ventricular tachycardia ([address] on their problem list.    Date of Treat: 3/15/2021  Evaluating Therapist: Madhavi Mauricio    Current Diet level:  Regular solid consistency with thin liquids    Reason for Referral  Aileen Del Real was referred for a bedside swallow evaluation to assess the efficiency of his swallow function, identify signs and symptoms of aspiration and make recommendations regarding safe dietary consistencies, effective compensatory strategies, and safe eating environment. Impression  Monitored patient's swallowing function. Patient exhibits slow, decreased oral prep and decreased oral transit of more solid consistencies (regular solid consistency residue cleared from the mouth with additional dry swallows). Patient also exhibits sluggish, mild-moderately decreased laryngeal elevation for swallow airway protection. Even so, no outward S/S penetration/aspiration was noted with any regular solid consistency presentation or thin liquid presentation administered during treatment session this date.     At this time, would recommend continuation regular solid consistency and thin liquids. Self-feed. Will continue to follow.     Treatment Plan  Requires SLP Intervention: Yes     Recommended Diet and Intervention  Diet Solids Recommendation: Regular solid   Liquid Consistency Recommendation: Thin   Recommended Form of Meds: PO  Therapeutic Interventions: Patient/Family education;Diet tolerance monitoring     Compensatory Swallowing Strategies  Compensatory Swallowing Strategies: Upright as possible for all oral intake;Small bites/sips;Eat/Feed slowly; Alternate solids and liquids; Remain upright for 30-45 minutes after meals     Treatment/Goals  Timeframe for Short-term Goals: 1x/day for 3 days   Goal 1: Patient will tolerate regular solid consistency and thin liquids with min S/S penetration/aspiration during PO intake. Goal 2: Patient staff will follow swallow safety recommendations to decrease risk of penetration/aspiration during PO intake. General  Chart Reviewed: Yes  Behavior/Cognition: Alert; Cooperative  O2 Device: Nasal Cannula  Communication Observation: (SLP ranked functional intelligibility of speech for unfamiliar listeners at 100% in utterances with background noise present.)  Follows Directions: Simple   Dentition: Adequate   Patient Positioning: Upright in bed  Consistencies Administered: Regular solid; Thin - cup     Monitored patient's swallowing function with the following observations noted:     Oral Phase  Mastication: Regular solid (Patient exhibited slow oral prep with decreased rotary jaw movement noted during regular solid consistency presentations administered independently.)  Decreased Oral Transit: Regular solid (Min-moderate oral cavity residue was noted post swallows; residue cleared from the mouth with additional dry swallows.)  Oral Phase - Comment: Oral transit of regular solid consistency primarily measured 1-2 seconds in length. Oral transit of thin liquid presentations, administered independently via cup, primarily measured 1-2 seconds in length.      Pharyngeal Phase  Laryngeal Elevation: (Patient exhibited sluggish, mild-moderately decreased laryngeal elevation for swallow airway protection.)  Pharyngeal Phase - Comment: No outward S/S penetration/aspiration was noted with any regular solid consistency presentation or thin liquid presentation administered during treatment session this date.     At this time, would recommend continuation regular solid consistency and thin liquids. Self-feed. Will continue to follow.     Electronically signed by HALINA Medina on 3/15/2021 at 11:26 AM

## 2021-03-15 NOTE — PROGRESS NOTES
Nurse speaks with [facility] at [phone]. States that they have to check and see if they can bring trilogy machine up to 8th floor rehab.   Magdalena Baugh RN updates Harriett Leonard RN via telephone

## 2021-03-15 NOTE — PROGRESS NOTES
Physical Therapy    Facility/Department: [facility] PROGRESSIVE CARE  Initial Assessment    NAME: Nahum Fernandes  : 1940  MRN: [id number]    Date of Service: 3/15/2021    Discharge Recommendations:  Continue to assess pending progress, Patient would benefit from continued therapy after discharge(Pt PLANS TO D/C TO SHORT TERM REHAB)   PT Equipment Recommendations  Other: ASSESSING    Assessment   Body structures, Functions, Activity limitations: Decreased functional mobility ; Decreased endurance;Decreased posture;Decreased strength  Assessment: pt WOULD BENEFIT FROM SKILLED PT IN THIS SETTING AND SHORT TERM REHAB AT D/C IN ORDER TO GAIN STRENGTH AND ENDURANCE FOR D/C HOME AND BE IND WITH ADL'S AND AMB. Prognosis: Good  Decision Making: Low Complexity  PT Education: Gait Training;General Safety; Family Education;Transfer Training;Precautions; Functional Mobility Training  Patient Education: PACER RESTRICITONS WITH L UE. SAFETY WITH SIT TO STAND AS Pt TRIED TO SIT WITHOUT BEING FULLY TURNED AND BACKED TO RECLINER  REQUIRES PT FOLLOW UP: Yes  Activity Tolerance  Activity Tolerance: Patient Tolerated treatment well;Patient limited by fatigue;Patient limited by endurance       Patient Diagnosis(es): The primary encounter diagnosis was Acute on chronic respiratory failure with hypoxia and hypercapnia ([address] Diagnoses of Pneumonia due to organism, NSVT (nonsustained ventricular tachycardia) ([address] and Recurrent pneumonia were also pertinent to this visit.      has a past medical history of Acute liver failure without hepatic coma, Back pain, Bladder cancer (HCC), Blood circulation, collateral, Carotid arterial disease ([address] CKD (chronic kidney disease), stage II, COPD with acute lower respiratory infection ([address] GERD (gastroesophageal reflux disease), Hyperlipidemia, Hypertension, Hypertension, Palliative care patient, Pneumonia due to infectious organism, Primary osteoarthritis of left knee, PVD (peripheral vascular disease) ([facility] Tremor, and Type 2 diabetes mellitus with complication, without long-term current use of insulin ([facility] has a past surgical history that includes back surgery; Tonsillectomy and adenoidectomy; Colonoscopy (2007?); pr colonoscopy flx dx w/collj spec when pfrmd (N/A, 9/11/2017); pr revise median n/carpal tunnel surg (Left, 7/18/2018); pr thromboendartectmy neck,neck incis (Left, 8/28/2018); vascular surgery (04/21/2015); vascular surgery (01/13/2015); vascular surgery (03/11/2014); vascular surgery (01/18/2013); pr total knee arthroplasty (Left, 10/15/2018); vascular surgery (10/30/2018); vascular surgery (12/17/2018); and Cystoscopy (Bilateral, 12/19/2018). Restrictions  Restrictions/Precautions  Restrictions/Precautions: Surgical Protocols  Implants present? : Pacemaker  Vision/Hearing        Subjective  General  Patient assessed for rehabilitation services?: Yes  Diagnosis: PNA, PACER INSERTION 3-  Follows Commands: Within Functional Limits  General Comment  Comments: WEARING 02  Subjective  Subjective: pt STATES HE IS HOPING TO D/C TO SHORT TERM REHAB PRIOR TO D/C HOME WITH WIFE (WHO HAS MILD DEMENTIA)  Pain Screening  Patient Currently in Pain: Denies(Simultaneous filing. User may not have seen previous data.)  Vital Signs  Patient Currently in Pain: Denies(Simultaneous filing.  User may not have seen previous data.)       Orientation  Orientation  Overall Orientation Status: Within Functional Limits  Social/Functional History  Social/Functional History  Lives With: Spouse  Type of Home: House  ADL Assistance: Independent  Ambulation Assistance: Independent  Transfer Assistance: Independent  Active : Yes  Cognition        Objective          AROM RLE (degrees)  RLE AROM: WFL  AROM LLE (degrees)  LLE AROM : WFL  Strength Other  Other: ANTIGRAVITY BILAT LE'S        Bed mobility  Rolling to Left: Stand by assistance  Supine to Sit: Minimal assistance;Contact guard assistance Transfers  Sit to Stand: Contact guard assistance;Minimal Assistance  Stand to sit: Contact guard assistance  Comment: REQUIRES VC'S FOR PROPER HAND PLACMENT  Ambulation  Ambulation?: Yes  Ambulation 1  Surface: level tile  Device: Rolling Walker  Other Apparatus: O2  Assistance: Contact guard assistance  Quality of Gait: MILDLY FLEXED POSTURE, GUARDED.  NO LOB  Gait Deviations: Slow Nae;Decreased step length;Decreased step height  Distance: 20 FT  Comments: pt STATES THAT IS AS FAR AS HE CAN AMB AT TIME OF EVAL DUE TO SOA AND FATIGUE BUT NOT IN DISTRESS              Plan   Plan  Times per week: 5-7  Plan weeks: 2  Current Treatment Recommendations: Strengthening, Safety Education & Training, Endurance Training, Patient/Caregiver Education & Training, Functional Mobility Training, Transfer Training, Gait Training, Positioning  Plan Comment: 02  Safety Devices  Type of devices: Call light within reach, Gait belt, Left in chair, Chair alarm in place         Goals  Short term goals  Time Frame for Short term goals: 2 WKS  Short term goal 1: SUP<>SIT, IND  Short term goal 2: SIT<>STAND, SBA  Short term goal 3:  FT WITH RW, SBA  Patient Goals   Patient goals : D/C TO IN Pt REHAB               Richard Rader PT    Electronically signed by Richard Rader PT on 3/15/2021 at 10:52 AM

## 2021-03-15 NOTE — PROGRESS NOTES
Toma Duarte arrived to room # 816. Presented with: Acute Hypoxemic respiratory failure. Mental Status: Patient is oriented and alert. Vitals:    03/15/21 1453   BP: 90/60   Pulse: 64   Resp: 16   Temp: 97.2 °F (36.2 °C)   SpO2: 92%     Patient safety contract and falls prevention contract reviewed with patient Yes. Oriented Patient to room. Call light within reach. Yes.   Needs, issues or concerns expressed at this time: no.      Electronically signed by Marciano Estevez RN on 3/15/2021 at 3:24 PM

## 2021-03-15 NOTE — PROGRESS NOTES
Central line removed per policy. Manual pressure held 10 minutes. Patient instructed to lay flat for 30 minutes. Patient tolerated well.

## 2021-03-15 NOTE — DISCHARGE SUMMARY
Physician Discharge Summary     Patient ID:  Abbie Land  [id number]  54 y.o.  1940    Admit date: 3/6/2021    Discharge date and time: 3/15/2021    Admitting Physician: Dr Ava Roman    Discharge Physician: Johnny Ni MD    Discharge Diagnoses:     Afib with RVR  Symptomatic bradycardia  Status post pacemaker insertion. Acute on chronic combined hypoxemic and hypercapnic respiratory failure  PNA  DM2  CKD  HTN  Vit D def  Folate def  Anemia- of iron and folate def  Debilitation    Discharged Condition: stable    Indication for Admission: dyspnea    Hospital Course:     \"79year-old male with past medical history as listed below presented to ER with shortness of breath. Multiple prior admissions for the same. Required intubation in 1/2021. Prior tobacco dependence. History of COPD. Also complains of low-grade fevers. No further history provided. \" per Dr Ava Roman HP    His admission chest x-ray showed increased pulmonary vascular congestion and bilateral pleural effusion seen on CT of the chest.  He was started on noninvasive ventilation and transition to nasal cannula oxygen. Pt was diagnoses with afib with RVR, treated with dig/amiodarone/lopressor. Now in NSR. Sp pacemaker insertion. Not on anticoagulation.      Consults: cardiology and pulmonary/intensive care    Significant Diagnostic Studies:     VL Extremity Venous Left [[id number]] Collected: 03/13/21 1012      Order Status: Completed Updated: 03/15/21 0714     Narrative:       Vascular Upper Extremities Veins Procedure      Demographics        Patient Name   [facility]                  80                    E        Patient Number  [id number]            Gender               Male        Visit Number    [id number]         Interpreting         Mary Prabhakar MD                                      Physician        Date of Birth   1940        Referring Physician Chanel Franks MD        Accession       [id number]        Sonographer         Paulo Haynes RT,    Number                                                 RVT       Procedure   Type of Study:        Veins:Upper Extremities Veins, US Doppler Venous Upper Extremity    Unilateral.       Indications for Study:Pacemaker. Risk Factors       - The patient's last creatinine was 1.6 mg/dl. Allergies     - Other allergy:(Eliquis).   - Phenergan.      Impression        Images from left subclavian vein access, available for inspection. See    operative report for details.        Signature        ----------------------------------------------------------------    Electronically signed by Mercedes Us MD(Interpreting    [id number]) on 03/15/2021 07:13 AM    ----------------------------------------------------------------         XR CHEST PORTABLE [[id number]] Resulted: 03/13/21 1344     Order Status: Completed Updated: 03/13/21 1346     Narrative:       XR CHEST PORTABLE 3/13/2021 12:11 PM   HISTORY:   Pacemaker placement     Single view. COMPARISONS:  3/11/2021 chest radiography   FINDINGS:   Left-sided permanent cardiac pacemaker identified with   atrioventricular leads apparently well-positioned. There are no pneumothoraces or pacemaker complication findings. Again noted are bilateral perihilar and lower lobe infiltrates greater   on the right with pleural effusions.     Impression:       Left-sided pacemaker placed without pneumothorax or postprocedural   complications. Signed by Dr Kike Hinton on 3/13/2021 1:44 PM     VL Extremity Venous Left [[id number]]      Order Status: Canceled      XR CHEST PORTABLE [[id number]] Resulted: 03/11/21 0720     Order Status: Completed Updated: 03/11/21 0722     Narrative:       XR CHEST PORTABLE 3/11/2021 3:40 AM   HISTORY:   Respiratory distress     Single view. COMPARISONS:  3/7/2021 chest radiography   FINDINGS:   Central venous catheter is appreciated at the left   subclavian/brachiocephalic vein junction.    Bilateral perihilar infiltrates and pleural effusions persist, greater   on the right, likely unchanged considering differences in technique.     Impression:       1. Stable 1 day appearance the chest.   Signed by Dr Skye Macias on 3/11/2021 7:20 AM     XR CHEST PORTABLE [[id number]] Resulted: 03/07/21 1336     Order Status: Completed Updated: 03/07/21 1338     Narrative:       Exam: XR CHEST PORTABLE - 3/7/2021 12:14 PM   Indication: Central line placement   Comparison: 3/6/2021   Findings:   CVL tip overlies the SVC. Cardiac silhouette is stable. No significant   change in bilateral interstitial and airspace opacity, greatest in the   RIGHT lower lobe. No visible pneumothorax. No acute osseous finding.     Impression:       Impression:   1.  CVL tip overlies the SVC. 2.  No significant change in bilateral interstitial and airspace   opacity, greatest in the RIGHT lower lung. Signed by Dr Bee Hall on 3/7/2021 1:36 PM     CTA PULMONARY W CONTRAST [[id number]] Resulted: 03/06/21 1356     Order Status: Completed Updated: 03/06/21 1359     Narrative:       Exam: CT chest angiography with 3D MIP images with IV contrast -   3/6/2021 12:33 PM   Indication: Worsening respiratory function, history of pneumonia   Comparison: 2/13/2021   DLP: 927 mGy cm. In order to have a CT radiation dose as low as   reasonably achievable, Automated Exposure Control was utilized for   adjustment of the mA and/or KV according to patient size. Findings:   No evidence of pulmonary embolus. Main pulmonary artery is upper   limits of normal in caliber. Thoracic aorta is nonaneurysmal.   Atherosclerotic change in the aortic arch and coronary arteries noted. No pericardial effusion. Central airways are clear. Interlobular septal thickening. Slight   interval worsening of multifocal bilateral consolidation and   groundglass opacity. Disease burden is greatest in the RIGHT upper   lobe.  Small RIGHT greater than LEFT pleural effusions with adjacent   atelectasis. No pneumothorax. Mild mediastinal lymphadenopathy similar to prior and likely reactive. 1.3 cm RIGHT thyroid nodule. No acute chest wall soft tissue   abnormality. No acute osseous finding. Thoracic spine is scoliotic   curvature and degenerative change. No acute osseous finding.     Impression:       Impression:   1.  No evidence of pulmonary embolus. 2.  Slight progression of bilateral RIGHT greater than LEFT   consolidation, suspicious for pneumonia. 3.  Small bilateral pleural effusions and interstitial pulmonary   edema. Signed by Dr Rosanna Walters on 3/6/2021 1:56 PM     XR CHEST PORTABLE [[id number]] Resulted: 03/06/21 1314     Order Status: Completed Updated: 03/06/21 1317     Narrative:       Exam: XR CHEST PORTABLE - 3/6/2021 11:52 AM   Indication: Shortness of air   Comparison: 2/12/2021   Findings:   Persistent and slightly increased RIGHT greater than LEFT middle and   lower lung zone airspace opacity. No large pleural effusion. No   pneumothorax. Cardiac silhouette is stable. No acute osseous finding.     Impression:       Impression:   Slight increase in bilateral RIGHT greater than LEFT middle and lower   lung zone airspace opacity.    Signed by Dr Rosanna Walters on 3/6/2021 1:14 PM             Discharge Exam:  BP (!) 101/54   Pulse 60   Temp 97.3 °F (36.3 °C)   Resp 18   Ht 6' (1.829 m)   Wt 250 lb (113.4 kg)   SpO2 91%   BMI 33.91 kg/m²     General Appearance:    Alert, cooperative, no distress, appears stated age   Head:    Normocephalic, without obvious abnormality, atraumatic           Nose:   Nares normal, septum midline, mucosa normal, no drainage    or sinus tenderness       Neck:   Supple, symmetrical, trachea midline, no adenopathy;        thyroid:  No enlargement/tenderness/nodules; no carotid    bruit or JVD       Lungs:     Clear to auscultation bilaterally, respirations unlabored       Heart:    Regular rate and rhythm, S1 and S2 normal, no murmur, rub   or gallop   Abdomen:     Soft, non-tender, bowel sounds active all four quadrants,     no masses, no organomegaly           Extremities:   Extremities normal, atraumatic, no cyanosis or edema   Pulses:   2+ and symmetric all extremities           Neurologic:   CNII-XII intact. Normal strength, sensation        throughout       Discharge Medications:       Jl Acosta   Home Medication Instructions [id number]    Printed on:03/15/21 4101   Medication Information                      aspirin 81 MG EC tablet  Take 1 tablet by mouth daily             atorvastatin (LIPITOR) 20 MG tablet  Take 1 tablet by mouth daily             calcium carbonate (TUMS) 500 MG chewable tablet  Take 1 tablet by mouth 3 times daily as needed for Heartburn             famotidine (PEPCID) 20 MG tablet  Take 1 tablet by mouth daily             glipiZIDE (GLUCOTROL) 5 MG tablet  Take 1 tablet by mouth every morning (before breakfast)             guaiFENesin (ROBITUSSIN) 100 MG/5ML syrup  Take 10 mLs by mouth every 4 hours as needed for Cough             metoprolol succinate (TOPROL XL) 100 MG extended release tablet  Take 1 tablet by mouth daily             NIFEdipine (ADALAT CC) 60 MG extended release tablet  Take 1 tablet by mouth daily             pantoprazole (PROTONIX) 40 MG tablet  Take 1 tablet by mouth daily             tamsulosin (FLOMAX) 0.4 MG capsule  Take 1 capsule by mouth daily             trospium (SANCTURA) 20 MG tablet  Take 1 tablet by mouth nightly                   Discharge Instructions: Follow up with Kitty Santiago MD in 3 days. With cardiology and pulmonology per their orders. Take medications as directed. Resume activity as tolerated. Diet: DIET CARDIAC; Disposition: Patient is medically stable and will be discharged to acute rehab.

## 2021-03-15 NOTE — PROGRESS NOTES
[facility] has called and requested status of patient discharge. Made aware of patient discharge to acute rehab 8th floor. Return call placed at 1440 to confirm if company is bringing machine to rehab.

## 2021-03-15 NOTE — PLAN OF CARE
[address] TREATMENT PLAN      Thuy Weller    : 1940  Acct #: [de-identified]  MRN: [id number]   PHYSICIAN:  Dennis Francisco MD  Primary Problem    Patient Active Problem List   Diagnosis    Diverticulitis of large intestine with perforation without bleeding    Generalized abdominal pain    Colonic diverticular abscess    Bilateral carotid artery stenosis    Atherosclerosis of native artery of both lower extremities with intermittent claudication (HCC)    Carotid stenosis, asymptomatic, left    Primary osteoarthritis of left knee    Arthritis of knee    Essential hypertension    Pure hypercholesterolemia    Slow transit constipation    Iron deficiency anemia    GERD (gastroesophageal reflux disease)    Acute liver failure without hepatic coma    Acute kidney injury ([address]    CHF (congestive heart failure) (HCC)    Bilateral pleural effusion    Elevated troponin    Palliative care patient    Shock liver    Acute renal failure (HCC)    BPH associated with nocturia    Bleeding diathesis ([address]    Epistaxis    Acute blood loss anemia    Melena    Thrombocytopenia (HCC)    Hypocalcemia    Pneumonia due to infectious organism    Bladder cancer ([address]    Postoperative pain    History of bladder cancer    Severe sepsis (HCC)    Acute on chronic respiratory failure (HCC)    Acute on chronic kidney failure (HCC)    Hypoxemia    Metabolic acidosis    Acidosis, metabolic, with respiratory acidosis    Acute respiratory failure (HCC)    Type 2 diabetes mellitus with complication, without long-term current use of insulin (HCC)    Weakness    Other dysphagia    Chronic midline low back pain without sciatica    COPD with acute lower respiratory infection (HCC)    Chronic kidney disease    Recurrent pneumonia    Nonsustained ventricular tachycardia (HCC)    Atrial fibrillation (HCC)    Vitamin D deficiency    Anemia    Alcohol use    Pacemaker       Rehabilitation RW   Short term goal 4: CGA CAR TF WITH RW   Short term goal 5: SUPERVISION HEP             Long term goals  Time Frame for Long term goals : 2-3 WEEKS   Long term goal 1: INDEPENDENT BED MOBILITY   Long term goal 2: INDEPENDENT STS TF   Long term goal 3: SUPERVISION 100 FT AMBULATION RW FOR SAFETY DUE TO DECREASED CV ENDURANCE   Long term goal 4: INDEPENDENT STAIR NEGOTIATION 4 STEPS  Long term goal 5: INDEPENDENT HEP  These goals were reviewed with this patient at the time of assessment and Christel Sinha is in agreement.      Plan of Care: Frequency:  [x] 5 days per week, 90 minutes per day                             []  5 days per week, 60 minutes per day               Current Treatment Recommendations: Strengthening, ROM, Balance Training, Functional Mobility Training, Transfer Training, Endurance Training, Wheelchair Mobility Training, Gait Training, Stair training, Home Exercise Program, Safety Education & Training, Patient/Caregiver Education & Training    IRF-RHONDA  Roll Left and Right  Assistance Needed: Supervision or touching assistance  CARE Score: 4  Discharge Goal: Independent  Sit to Lying  Assistance Needed: Supervision or touching assistance  CARE Score: 4  Discharge Goal: Independent  Lying to Sitting on Side of Bed  Assistance Needed: Supervision or touching assistance  CARE Score: 4  Discharge Goal: Independent  Sit to Stand  Assistance Needed: Supervision or touching assistance  CARE Score: 4  Discharge Goal: Independent  Chair/Bed-to-Chair Transfer  Assistance Needed: Supervision or touching assistance  CARE Score: 4  Discharge Goal: Independent  Car Transfer  Reason if not Attempted: Not attempted due to medical condition or safety concerns  CARE Score: 88  Discharge Goal: Independent  Walk 10 Feet  Reason if not Attempted: Not attempted due to medical condition or safety concerns  CARE Score: 88  Discharge Goal: Independent  Walk 50 Feet with Two Turns  Reason if not Attempted: Not attempted due to medical condition or safety concerns  CARE Score: 88  Discharge Goal: Independent  Walk 150 Feet  Reason if not Attempted: Not attempted due to medical condition or safety concerns  CARE Score: 88  Discharge Goal: Independent  Walking 10 Feet on Uneven Surfaces  Reason if not Attempted: Not attempted due to medical condition or safety concerns  CARE Score: 88  Discharge Goal: Supervision or touching assistance  1 Step (Curb)  Reason if not Attempted: Not attempted due to medical condition or safety concerns  CARE Score: 88  Discharge Goal: Independent  4 Steps  Reason if not Attempted: Not attempted due to medical condition or safety concerns  CARE Score: 88  Discharge Goal: Independent  12 Steps  Reason if not Attempted: Not attempted due to medical condition or safety concerns  CARE Score: 88  Discharge Goal: Not Attempted  Wheel 50 Feet with Two Turns  Assistance Needed: Supervision or touching assistance  CARE Score: 4  Discharge Goal: Independent  Wheel 150 Feet  Reason if not Attempted: Not attempted due to medical condition or safety concerns  CARE Score: 88  Discharge Goal: Supervision or touching assistance    OCCUPATIONAL THERAPY:  Goals:             Short term goals  Time Frame for Short term goals: 1 week  Short term goal 1: pt will complete LB dressing with AE prn with CGA  Short term goal 2: pt will complete overall toileting with CGA  Short term goal 3: pt will complete simple ambulatory home making task with CGA while maintaining pacemaker precautions  Short term goal 4: pt will complete overall bathing with CGA  Short term goal 5: pt will complete 1-2 handed static standing act for 2 mins with supervision  Short term goal 6: pt will complete HEP x 5 occasions within range of pacemaker precautions to improve strength and endurance for ADLs :  Long term goals  Time Frame for Long term goals : 2 weeks  Long term goal 1: pt will complete overall dressing with [facility] out of 7 days per week. [] In this rare instance due to the nature of this patient's medical involvement, this patient will be seen 15 hours per week (900 minutes within a 7 day period). Treatments may include therapeutic exercises, gait training, neuromuscular re-ed, transfer training, community reintegration, bed mobility, w/c mobility and training, self care, home mgmt, cognitive training, energy conservation,dysphagia tx, speech/language/communication therapy, group therapy, and patient/family education. In addition, dietician/nutritionist may monitor calorie count as well as intake and collaboratively work with SLP on dietary upgrades. Neuropsychology/Psychology may evaluate and provide necessary support. Medical issues being managed closely and that require 24 hour availability of a physician:   [] Swallowing Precautions  [x] Bowel/Bladder Fx  [] Weight bearing precautions   [] Wound Care    [] Pain Mgmt   [x] Infection Protection   [x] DVT Prophylaxis   [x] Fall Precautions  [x] Fluid/Electrolyte/Nutrition Balance   [] Voice Protection   [x] Respiratory  [] Other:    Medical Prognosis: [] Good  [x] Fair    [] Guarded   Total expected IRF days:  20  Anticipated discharge destination:    [] Home Independently   [x] Home with supervision    []SNF     [] Other                                           Physician anticipated functional outcomes:  Ambulate household distances independently with assistive device. IPOC brief synthesis: Acute inpatient rehabilitation with occupational   physical therapy, 180 minutes, 5 every 7   days will address basic and advancing mobility with self-care   instruction and adaptive equipment training. Caregiver education will   be offered. Expected length of stay prior to the supervised level of   functional discharge to home with home care is 20 days. Assessment and Plan:  1. Respiratory failure. CHF/COPD-O2/Duonebs PRN/tapering prednisone/Trilogy  2.  Atrial fibrillation/S/P PPM-Amiodarone/Procardia XL/metorprolol  3. CAD/PVD-ASA/statin  4. Hyperlipidemia-on statin  5. Anemia-on iron  6. HTN-on meds monitor  7. GI-bowel regimen/PPI  8. BPH/history of bladder cancer-on meds  9. Vitamin D deficiency-on Vitamin D  10. DVT prophylaxis-SCDs  11. ETOH use-folic acid  12. Chronic kidney disease-monitor   13. Thrombocytopenia-monitor   14. Back pain/arthritis-Tylenol  15.  PT/OT/Speech               Case Mgmt: Electronically signed by FELIPA Osuna on 3/17/2021 at 4:01 PM      OT: Electronically signed by Moses Dale OT on 3/16/2021 at 1:36 PM    PT: Electronically signed by Bryce Tabor PT on 3/16/2021 at 3:45 PM      RN: Electronically signed by Julio C Zarate RN on 3/15/21 at 3:26 PM CDT      ST: Electronically signed by HALINA Thacker on 3/16/2021 at 2:11 PM

## 2021-03-15 NOTE — PROGRESS NOTES
Patient has toileteries, cell phone, , and bag with clothing. Marcos Moy made aware of patient transfer and neg covid.

## 2021-03-15 NOTE — CARE COORDINATION
[facility]   [facility] Ph: [phone]  Fa: [phone]  Electronically signed by Perry García RN on 3/15/2021 at 9:55 AM

## 2021-03-15 NOTE — CARE COORDINATION
The [address] at [facility]  Notification of Admission Decision      [x] Patient has been accepted for admit to [facility] on : 3/15/21 Room 816      Please write discharge orders and summary prior to discharge. [] Patient acceptance to Rehab pending the following :    [] Eval in progress       [] Patient determined to be ineligible for services at [facility] because : We recommend you consider        Thank you for your referral, we appreciate you.     Electronically Signed by Hemalatha Chandra Admissions Coordinator 3/15/2021 12:10 PM

## 2021-03-16 PROBLEM — E55.9 VITAMIN D DEFICIENCY: Status: ACTIVE | Noted: 2021-03-16

## 2021-03-16 PROBLEM — N18.9 CHRONIC KIDNEY DISEASE: Status: ACTIVE | Noted: 2021-02-24

## 2021-03-16 PROBLEM — F10.90 ALCOHOL USE: Status: ACTIVE | Noted: 2021-03-16

## 2021-03-16 PROBLEM — Z78.9 ALCOHOL USE: Status: ACTIVE | Noted: 2021-03-16

## 2021-03-16 PROBLEM — D64.9 ANEMIA: Status: ACTIVE | Noted: 2021-03-16

## 2021-03-16 PROBLEM — Z95.0 PACEMAKER: Status: ACTIVE | Noted: 2021-03-16

## 2021-03-16 PROBLEM — I48.91 ATRIAL FIBRILLATION (HCC): Status: ACTIVE | Noted: 2021-03-16

## 2021-03-16 PROBLEM — G89.29 CHRONIC MIDLINE LOW BACK PAIN WITHOUT SCIATICA: Status: ACTIVE | Noted: 2021-01-22

## 2021-03-16 LAB
ALBUMIN SERPL-MCNC: 2.9 G/DL (ref 3.5–5.2)
ALP BLD-CCNC: 73 U/L (ref 40–130)
ALT SERPL-CCNC: 17 U/L (ref 5–41)
ANION GAP SERPL CALCULATED.3IONS-SCNC: 9 MMOL/L (ref 7–19)
AST SERPL-CCNC: 20 U/L (ref 5–40)
BILIRUB SERPL-MCNC: 0.5 MG/DL (ref 0.2–1.2)
BUN BLDV-MCNC: 48 MG/DL (ref 8–23)
CALCIUM SERPL-MCNC: 8.3 MG/DL (ref 8.8–10.2)
CHLORIDE BLD-SCNC: 103 MMOL/L (ref 98–111)
CO2: 24 MMOL/L (ref 22–29)
CREAT SERPL-MCNC: 1.6 MG/DL (ref 0.5–1.2)
GFR AFRICAN AMERICAN: 51
GFR NON-AFRICAN AMERICAN: 42
GLUCOSE BLD-MCNC: 176 MG/DL (ref 74–109)
GLUCOSE BLD-MCNC: 195 MG/DL (ref 70–99)
GLUCOSE BLD-MCNC: 202 MG/DL (ref 70–99)
GLUCOSE BLD-MCNC: 214 MG/DL (ref 70–99)
GLUCOSE BLD-MCNC: 238 MG/DL (ref 70–99)
HCT VFR BLD CALC: 23.4 % (ref 42–52)
HEMOGLOBIN: 7.1 G/DL (ref 14–18)
MCH RBC QN AUTO: 29.2 PG (ref 27–31)
MCHC RBC AUTO-ENTMCNC: 30.3 G/DL (ref 33–37)
MCV RBC AUTO: 96.3 FL (ref 80–94)
PDW BLD-RTO: 16.1 % (ref 11.5–14.5)
PERFORMED ON: ABNORMAL
PLATELET # BLD: 111 K/UL (ref 130–400)
PMV BLD AUTO: 13.2 FL (ref 9.4–12.4)
POTASSIUM SERPL-SCNC: 5 MMOL/L (ref 3.5–5)
PREALBUMIN: 21 MG/DL (ref 20–40)
RBC # BLD: 2.43 M/UL (ref 4.7–6.1)
SODIUM BLD-SCNC: 136 MMOL/L (ref 136–145)
TOTAL PROTEIN: 4.9 G/DL (ref 6.6–8.7)
WBC # BLD: 9.5 K/UL (ref 4.8–10.8)

## 2021-03-16 PROCEDURE — 82947 ASSAY GLUCOSE BLOOD QUANT: CPT

## 2021-03-16 PROCEDURE — 99223 1ST HOSP IP/OBS HIGH 75: CPT | Performed by: PSYCHIATRY & NEUROLOGY

## 2021-03-16 PROCEDURE — 2700000000 HC OXYGEN THERAPY PER DAY

## 2021-03-16 PROCEDURE — 80053 COMPREHEN METABOLIC PANEL: CPT

## 2021-03-16 PROCEDURE — 36415 COLL VENOUS BLD VENIPUNCTURE: CPT

## 2021-03-16 PROCEDURE — 97165 OT EVAL LOW COMPLEX 30 MIN: CPT

## 2021-03-16 PROCEDURE — 6370000000 HC RX 637 (ALT 250 FOR IP): Performed by: PSYCHIATRY & NEUROLOGY

## 2021-03-16 PROCEDURE — 97110 THERAPEUTIC EXERCISES: CPT

## 2021-03-16 PROCEDURE — 85027 COMPLETE CBC AUTOMATED: CPT

## 2021-03-16 PROCEDURE — 92610 EVALUATE SWALLOWING FUNCTION: CPT

## 2021-03-16 PROCEDURE — 1180000000 HC REHAB R&B

## 2021-03-16 PROCEDURE — 6370000000 HC RX 637 (ALT 250 FOR IP): Performed by: HOSPITALIST

## 2021-03-16 PROCEDURE — 97535 SELF CARE MNGMENT TRAINING: CPT

## 2021-03-16 PROCEDURE — 84134 ASSAY OF PREALBUMIN: CPT

## 2021-03-16 PROCEDURE — 92522 EVALUATE SPEECH PRODUCTION: CPT

## 2021-03-16 PROCEDURE — 97161 PT EVAL LOW COMPLEX 20 MIN: CPT

## 2021-03-16 PROCEDURE — 51798 US URINE CAPACITY MEASURE: CPT

## 2021-03-16 RX ADMIN — ATORVASTATIN CALCIUM 20 MG: 20 TABLET, FILM COATED ORAL at 08:56

## 2021-03-16 RX ADMIN — TAMSULOSIN HYDROCHLORIDE 0.4 MG: 0.4 CAPSULE ORAL at 20:36

## 2021-03-16 RX ADMIN — MICONAZOLE NITRATE: 2 POWDER TOPICAL at 09:03

## 2021-03-16 RX ADMIN — FERROUS SULFATE TAB 325 MG (65 MG ELEMENTAL FE) 325 MG: 325 (65 FE) TAB at 16:51

## 2021-03-16 RX ADMIN — MICONAZOLE NITRATE: 2 POWDER TOPICAL at 20:50

## 2021-03-16 RX ADMIN — INSULIN LISPRO 2 UNITS: 100 INJECTION, SOLUTION INTRAVENOUS; SUBCUTANEOUS at 12:21

## 2021-03-16 RX ADMIN — NIFEDIPINE 60 MG: 60 TABLET, EXTENDED RELEASE ORAL at 08:56

## 2021-03-16 RX ADMIN — TAMSULOSIN HYDROCHLORIDE 0.4 MG: 0.4 CAPSULE ORAL at 08:56

## 2021-03-16 RX ADMIN — PREDNISONE 10 MG: 20 TABLET ORAL at 08:57

## 2021-03-16 RX ADMIN — FERROUS SULFATE TAB 325 MG (65 MG ELEMENTAL FE) 325 MG: 325 (65 FE) TAB at 08:56

## 2021-03-16 RX ADMIN — MAGNESIUM CITRATE 296 ML: 1.75 LIQUID ORAL at 12:20

## 2021-03-16 RX ADMIN — FOLIC ACID 1 MG: 1 TABLET ORAL at 08:56

## 2021-03-16 RX ADMIN — ASPIRIN 81 MG: 81 TABLET, FILM COATED ORAL at 08:56

## 2021-03-16 RX ADMIN — PANTOPRAZOLE SODIUM 40 MG: 40 TABLET, DELAYED RELEASE ORAL at 08:56

## 2021-03-16 RX ADMIN — AMIODARONE HYDROCHLORIDE 200 MG: 200 TABLET ORAL at 08:56

## 2021-03-16 RX ADMIN — AMIODARONE HYDROCHLORIDE 200 MG: 200 TABLET ORAL at 20:36

## 2021-03-16 RX ADMIN — INSULIN LISPRO 1 UNITS: 100 INJECTION, SOLUTION INTRAVENOUS; SUBCUTANEOUS at 08:59

## 2021-03-16 RX ADMIN — METOPROLOL SUCCINATE 100 MG: 50 TABLET, EXTENDED RELEASE ORAL at 08:56

## 2021-03-16 RX ADMIN — INSULIN LISPRO 2 UNITS: 100 INJECTION, SOLUTION INTRAVENOUS; SUBCUTANEOUS at 16:51

## 2021-03-16 ASSESSMENT — PAIN SCALES - GENERAL
PAINLEVEL_OUTOF10: 0
PAINLEVEL_OUTOF10: 0

## 2021-03-16 NOTE — PLAN OF CARE
Problem: Falls - Risk of:  Goal: Will remain free from falls  Description: Will remain free from falls  Outcome: Ongoing  Goal: Absence of physical injury  Description: Absence of physical injury  Outcome: Ongoing     Problem: IP BLADDER/VOIDING  Goal: LTG - patient will complete bladder elimination  Outcome: Ongoing  Goal: LTG - Patient will utilize adaptive techniques/equipment to complete bladder elimination  Outcome: Ongoing  Goal: LTG - patient will achieve acceptable level of continence  Outcome: Ongoing  Goal: STG - Patient demonstrates ability to take care of indwelling catheter  Outcome: Ongoing  Goal: STG - patient demonstrates self-cath technique using clean technique and care of the catheter  Outcome: Ongoing  Goal: STG - Patient demonstrates no accidents  Outcome: Ongoing  Goal: STG - Patient will state signs and symptoms of UTI  Outcome: Ongoing  Goal: STG - patient will be able to empty bladder  Outcome: Ongoing  Goal: STG - Patient demonstrates understanding of self catheterization schedule by completing task on time  Outcome: Ongoing  Goal: STG - patient participates in bladder program by expressing need to void  Outcome: Ongoing  Goal: STG - Patient verbalizes understanding of catheter care indwelling/intermittent  Outcome: Ongoing  Goal: STG - patient participates in bladder program by adhering to implemented toileting schedule  Outcome: Ongoing     Problem: IP BOWEL ELIMINATION  Goal: LTG - patient will utilize adaptive techniques/equipment to complete bowel elimination  Outcome: Ongoing  Goal: LTG - patient will have regular and routine bowel evacuation  Outcome: Ongoing  Goal: STG - patient will be accident free  Outcome: Ongoing  Goal: STG - Patient demonstrates care and management of ostomy bag  Outcome: Ongoing  Goal: STG - Patient participates in bowel management program  Outcome: Ongoing  Goal: STG - patient maintains skin integrity  Outcome: Ongoing  Goal: STG - Patient will verbalize signs and symptoms of constipation and how to prevent/alleviate  Outcome: Ongoing  Goal: STG - patient will be continent of stool  Outcome: Ongoing  Goal: STG - Patient completes digital stimulation technique  Outcome: Ongoing  Goal: STG - Patient verbalizes knowledge about relationship between diet, fluid intake, activity and medication on constipation  Outcome: Ongoing     Problem: IP BREATHING  Goal: LTG - patient will mobilize secretions and maintain airway  Outcome: Ongoing  Goal: LTG - Patient/caregiver will demonstrate/perform proper techniques to maintain patent airway  Outcome: Ongoing  Goal: LTG - patient/caregiver will demonstrate/perform improved or stable self care abilities within physical limitations  Outcome: Ongoing  Goal: STG - Patient/caregiver will maintain patent airway  Outcome: Ongoing  Goal: STG - respiratory rate and effort will be within normal limits for the patient  Outcome: Ongoing  Goal: STG - patient/caregiver will be able to verbalize oxygen safety precautions  Outcome: Ongoing  Goal: STG - Patient/caregiver demonstrates correct suctioning technique  Outcome: Ongoing  Goal: STG - patient will utilize incentive spirometer  Outcome: Ongoing  Goal: STG - Patient performs or directs assisted coughing  Outcome: Ongoing  Goal: STG - patient can administer MDI's  Outcome: Ongoing  Goal: STG - Patient can utilize incentive spirometer  Outcome: Ongoing  Goal: STG - family can complete suctioning  Outcome: Ongoing     Problem: SAFETY  Goal: LTG - patient will adhere to hip precautions during ADL's and transfers  Outcome: Ongoing  Goal: LTG - Patient will demonstrate safety requirements appropriate to situation/environment  Outcome: Ongoing  Goal: LTG - patient will utilize safety techniques  Outcome: Ongoing  Goal: STG - patient locks brakes on wheelchair  Outcome: Ongoing  Goal: STG - Patient uses call light consistently to request assistance with transfers  Outcome: Ongoing  Goal: STG - patient uses gait belt during all transfers  Outcome: Ongoing     Problem: SKIN INTEGRITY  Goal: LTG - Patient will be free from infection  Outcome: Ongoing  Goal: LTG - patient will maintain/improve skin integrity through proper skin care techniques  Outcome: Ongoing  Goal: LTG - Patient will demonstrate appropriate pressure relief techniques  Outcome: Ongoing  Goal: LTG - patient will demonstrate appropriate skin care techniques  Outcome: Ongoing  Goal: LTG - Patient will be free from infection  Outcome: Ongoing  Goal: STG - Patient demonstrates skin care/treatment/dressing change  Outcome: Ongoing  Goal: STG - patient will maintain good skin integrity  Outcome: Ongoing  Goal: STG - Patient exhibits signs of wound healing.   Outcome: Ongoing  Goal: STG - patient demonstrates pressure reduction techniques  Outcome: Ongoing  Goal: STG - Patient demonstrates preventative skin care measures  Outcome: Ongoing     Problem: PAIN  Goal: LTG - Patient will manage pain with the appropriate technique/Intervention  Outcome: Ongoing  Goal: LTG - Patient will demonstrate intervention for managing pain  Outcome: Ongoing  Goal: STG - Patient will reduce or eliminate use of analgesics  Outcome: Ongoing  Goal: STG - pain is manageable through therapies  Outcome: Ongoing  Goal: STG - Patient will verbalize an acceptable level of pain  Outcome: Ongoing  Goal: STG - patients pain is managed to allow active participation in daily activities  Outcome: Ongoing  Goal: STG - Patient will increase activity level  Outcome: Ongoing  Goal: STG - patient verbalizes a reduction in pain level  Outcome: Ongoing

## 2021-03-16 NOTE — H&P
[facility]   History and Physical        Patient:   Ashley Christina  MR#:    [id number]  Account Number:                   [id number]      Room:    [address] [place] [address]   YOB: 1940  Date of Progress Note: 3/16/2021  Time of Note                           7:40 AM  Attending Physician:  Jluis Davalos MD        Admitting diagnosis:Acute and chronic respiratory failure with hypercapnia,Acute and chronic respiratory failure with hypoxia    Secondary diagnoses:Essential (primary) hypertension,Heart failure, unspecified,Chronic kidney disease, stage 3 unspecified,Gastro-esophageal reflux disease without esophagitis,Hyperlipidemia, unspecified,Peripheral vascular disease, unspecified,Enlarged prostate with lower urinary tract symptoms,Unspecified atrial fibrillation,Presence of cardiac pacemaker,Dependence on supplemental oxygen,Chronic obstructive pulmonary disease with (acute) exacerbation,Anemia, unspecified,Type 2 diabetes mellitus with hyperglycemia    CHIEF COMPLAINT:  Respiratory failure       HISTORY OF PRESENT ILLNESS:   This 79 y.o. male  with a past medical history of bladder cancer, HTN, CKD stage III, PVD, hyperlipidemia,CAD,COPD,CHF has oxygen at home but doesn't wear if often,ETOH use drinks 2 glasses of wine every night and former smoker. He presented to [facility] ER on 3/6/21 with shortness of breath. CXR done showed increased pulmonary vascular congestion and CT of chest showed bilateral pleural effusion. He was admitted to the hospitalist service with Acute on chronic hypoxemic and hypercapnic respiratory, COPD exacerbation and possible recurrent pneumonia. Pulmonology was consulted. He was seen by Dr. Gomez Murillo, who didn't feel he had pneumoinia st this time, but did recommend Trilogy at discharge d/t patient not tolerating Bi-PAP for questionable sleep apnea. Patient had an episode of A-Fib with RVR. Cardiology was consulted. He was seen by Dr. Frandy Jade.  He underwent cardiac catheterization by  Krishan on 3/10/21 revealing 100% occlusion right coronary artery which appears chronic in nature well collateralized from the left no significant disease on the left. Dr. Monica Zelaya recommended placement of a dual-chamber pacemaker. Patient was in agreement and went or surgery on 3/13/21. He tolerated the procedure well. SPT was consulted to evaluate swallow. He was noted to have oropharyngeal phase dysphagia but no s/s of aspiration, he was placed on a regular diet with thin liquids. He is participating in both PT/OT. He is felt to need a stay on Rehab to work towards his goal of returning home with his wife. He is now felt ready to start the Rehab program.    REVIEW OF SYSTEMS:  Constitutional - No fever or chills. No diaphoresis or significant fatigue. HENT -  No tinnitus or significant hearing loss. Eyes - no sudden vision change or eye pain  Respiratory - some shortness of breath or cough  Cardiovascular - no chest pain No palpitations or significant leg swelling  Gastrointestinal - no abdominal swelling or pain. Genitourinary - No difficulty urinating, dysuria  Musculoskeletal - no back pain or myalgia. Skin - no color change or rash  Neurologic - No seizures. No lateralizing weakness. Hematologic - no easy bruising or excessive bleeding. Psychiatric - no severe anxiety or nervousness. All other review of systems are negative.       Past Medical History:      Diagnosis Date    Acute liver failure without hepatic coma 10/23/2018    Back pain     \"with tired legs as a result\"    Bladder cancer ([facility] 12/19/2018    Blood circulation, collateral     Carotid arterial disease (HCC)     recent surgery    CKD (chronic kidney disease), 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/2018    COPD with acute lower respiratory infection ([address] 2/24/2021    GERD (gastroesophageal reflux disease)     Hyperlipidemia     Hypertension     Hypertension     Palliative care patient 10/23/2018    Pneumonia due to infectious organism 11/06/2018    Primary osteoarthritis of left knee 10/14/2018    PVD (peripheral vascular disease) (HCC)     Tremor     Tremor on Right side x 1-2 weeks per stepdaughter    Type 2 diabetes mellitus with complication, without long-term current use of insulin [facility] 1/21/2021       Past Surgical History:      Procedure Laterality Date    BACK SURGERY      COLONOSCOPY  2007?  CYSTOSCOPY Bilateral 12/19/2018    CYSTOSCOPY, BIOSPY FULGURATION OF BLADDER TUMOR POSSIBLE TURBT, RETROGRADE PYELOGRAM performed by Flako Bush MD at [facility] COLONOSCOPY FLX DX W/COLLJ SPEC WHEN PFRMD N/A 9/11/2017    Dr Kiara Escobar internal hemorrhoids, diverticular disease-HP-No recall (age)   Elizabeth Magallanes HI REVISE MEDIAN N/CARPAL TUNNEL SURG Left 7/18/2018    OPEN CARPAL TUNNEL RELEASE performed by Cesar Wagoner MD at [address] Left 8/28/2018    LEFT CAROTID ENDARTERECTOMY WITH VEIN PATCH ANGIOPLASTY AND COMPLETION ANGIOGRAM performed by Mirtha Rosas MD at [place] [address] Left 10/15/2018    LEFT COMPLEX TOTAL KNEE ARTHROPLASTY performed by Cesar Wagoner MD at [place] [address] VASCULAR SURGERY  04/21/2015    Tariq BESS Ultrasound guided access of left common femoral artery. Aortogram.Diagnostic right lower extremity arteriogram.Radiologic supervision and interpretation.  VASCULAR SURGERY  01/13/2015    Tariq Potter M.D Atherectomy,angioplasty,and stenting of left superficial femoral artery.  VASCULAR SURGERY  03/11/2014    Tariq Potter M.D. Ultrasound-guided access of right common femoral artery. Aortogram.Left lower extremity arteriogram.Atherectomy and angioplasty of left superficial femoral artery. Radiologic supervision and interpretation.  VASCULAR SURGERY  01/18/2013    Tariq BESS Aortogram.Multistation arteriogram right lower extremity. Laser atherectomy and angioplasty of right superficial femoral artery. Selective catheterization of right tibioperoneal trunk. Angioplasty of peroneal artery and tibioperoneal trunk.  VASCULAR SURGERY  10/30/2018    S. Ultrasound guided cannulation of right internal vein. Placement of right internal jugular vein tunneled dialysis catheter bard equistream xk 23cm tip to cuff    VASCULAR SURGERY  12/17/2018    S. Removal of tunneled dilaysis catheter right internal jugular vein.        Medications in Hospital:      Current Facility-Administered Medications:     magnesium citrate solution 296 mL, 296 mL, Oral, Once, Ayanna Woodward MD    ondansetron (ZOFRAN) injection 4 mg, 4 mg, Intravenous, Q6H PRN, Rubia Kaur MD    sodium chloride flush 0.9 % injection 10 mL, 10 mL, Intravenous, PRN, Rubia Kaur MD    [DISCONTINUED] acetaminophen (TYLENOL) tablet 650 mg, 650 mg, Oral, Q6H PRN **OR** acetaminophen (TYLENOL) suppository 650 mg, 650 mg, Rectal, Q6H PRN, Rubia Kaur MD    dextrose 5 % solution, 100 mL/hr, Intravenous, PRN, Rubia Kaur MD    dextrose 50 % IV solution, 12.5 g, Intravenous, PRN, Rubia Kaur MD    glucagon (rDNA) injection 1 mg, 1 mg, Intramuscular, PRN, Rubia Kaur MD    glucose (GLUTOSE) 40 % oral gel 15 g, 15 g, Oral, PRN, Rubia Kaur MD    amiodarone (CORDARONE) tablet 200 mg, 200 mg, Oral, BID, [place] Kaur MD, 200 mg at 03/15/21 2114    aspirin EC tablet 81 mg, 81 mg, Oral, Daily, Rubia Kaur MD    atorvastatin (LIPITOR) tablet 20 mg, 20 mg, Oral, Daily, [place] Kaur MD    bisacodyl (DULCOLAX) EC tablet 5 mg, 5 mg, Oral, Daily PRN, Rubia Kaur MD    calcium carbonate (TUMS) chewable tablet 500 mg, 500 mg, Oral, TID PRN, Rubia Kaur MD    ferrous sulfate (IRON 325) tablet 325 mg, 325 mg, Oral, BID WC, Rubia Kaur MD, 325 mg at 57/44/07 8102    folic acid (FOLVITE) tablet 1 mg, 1 mg, Oral, Daily, Los Oliva MD    guaiFENesin (ROBITUSSIN) 100 MG/5ML liquid 200 mg, 200 mg, Oral, Q4H PRN, Los Oliva MD    insulin lispro (HUMALOG) injection vial 0-6 Units, 0-6 Units, Subcutaneous, TID WC, Los Oliva MD, 3 Units at 03/15/21 1701    insulin lispro (HUMALOG) injection vial 0-3 Units, 0-3 Units, Subcutaneous, Nightly, Los Oliva MD, 2 Units at 03/15/21 2114    ipratropium-albuterol (DUONEB) nebulizer solution 1 ampule, 1 ampule, Inhalation, Q4H PRN, Los Oliva MD    metoprolol succinate (TOPROL XL) extended release tablet 100 mg, 100 mg, Oral, Daily, Los Oliva MD    miconazole (MICOTIN) 2 % powder, , Topical, 4x Daily, Los Oliva MD, Given at 03/15/21 2121    NIFEdipine (PROCARDIA XL) extended release tablet 60 mg, 60 mg, Oral, Daily, Los Oliva MD    pantoprazole (PROTONIX) tablet 40 mg, 40 mg, Oral, Daily, Los Oliva MD    predniSONE (DELTASONE) tablet 10 mg, 10 mg, Oral, Daily **FOLLOWED BY** [START ON 3/18/2021] predniSONE (DELTASONE) tablet 5 mg, 5 mg, Oral, Daily, Los Oliva MD    [facility] capsule 0.4 mg, 0.4 mg, Oral, BID, Los Oliva MD, 0.4 mg at 03/15/21 2114    [START ON 3/22/2021] vitamin D (ERGOCALCIFEROL) capsule 50,000 Units, 50,000 Units, Oral, Weekly, Los Oliva MD    acetaminophen (TYLENOL) tablet 650 mg, 650 mg, Oral, Q4H PRN, Boby Monroe MD    enoxaparin (LOVENOX) injection 40 mg, 40 mg, Subcutaneous, Daily, Boby Monroe MD, 40 mg at 03/15/21 1701    polyethylene glycol (GLYCOLAX) packet 17 g, 17 g, Oral, Daily PRN, Boby Monroe MD    Allergies:  Eliquis [apixaban] and Promethazine hcl    Social History:   TOBACCO:   reports that he quit smoking about 17 years ago. He has never used smokeless tobacco.  ETOH:   reports current alcohol use of about 12.0 standard drinks of alcohol per week.     Family History: Problem Relation Age of Onset    Colon Cancer Father     Diabetes Brother     Colon Polyps Neg Hx     Liver Cancer Neg Hx     Liver Disease Neg Hx     Esophageal Cancer Neg Hx     Rectal Cancer Neg Hx     Stomach Cancer Neg Hx            Physical Exam:    Vitals: /72   Pulse 66   Temp 97.6 °F (36.4 °C)   Resp 22   Ht 6' (1.829 m)   Wt 250 lb (113.4 kg)   SpO2 94%   BMI 33.91 kg/m²     Constitutional - well developed, well nourished. Eyes - conjunctiva normal.  Pupils not tested  Ear, nose, throat -hearing intact to finger rub No scars, masses, or lesions over external nose or ears, no atrophy of tongue  Neck-symmetric, no masses noted, no jugular vein distension  Respiration- chest wall appears symmetric, good expansion,   normal effort without use of accessory muscles  Musculoskeletal - no significant wasting of muscles noted, no bony deformities  Extremities-no clubbing, cyanosis or edema  Skin - warm, dry, and intact. No rash, erythema, or pallor.   Psychiatric - mood, affect, and behavior appear normal.      Neurological exam  Awake, alert, fluent oriented x 3 appropriate affect  Attention and concentration appear appropriate  Recent and remote memory appears unremarkable  Speech normal without dysarthria  No clear issues with language of fund of knowledge    Cranial Nerve Exam   CN II- Visual fields grossly unremarkable  CN III, IV,VI-EOMI, No nystagmus, conjugate eye movements, no ptosis  CN V-sensation intact to LT over face  CN VII-no facial assymetry  CN VIII-Hearing intact to finger rub  CN IX and X- Palate not tested  CN XI-not test shoulder shrug  CN XII-Tongue midline with no fasciculations or fibrillations    Motor Exam  Antigravity throughout upper and lower extremities bilaterally, no cogwheeling, normal tone    Sensory Exam  Sensation intact to light touch and temperature upper and lower extremities bilaterally    Reflexes   Not tested    Tremors- no tremors in hands or head noted    Gait  Not tested    Coordination  Finger to nose-unremarkable        CBC:   Recent Labs     03/14/21  0613 03/15/21  0645 03/16/21  0427   WBC 8.5 8.9 9.5   HGB 7.0* 7.2* 7.1*   * 115* 111*       BMP:    Recent Labs     03/13/21  1040 03/15/21  0903 03/16/21  0427   NA  --  139 136   K  --  4.8 5.0   CL  --  105 103   CO2 26 25 24   BUN  --  51* 48*   CREATININE 1.5* 1.5* 1.6*   GLUCOSE  --  185* 176*       Hepatic:   Recent Labs     03/15/21  0903 03/16/21  0427   AST 14 20   ALT 25 17   BILITOT 0.5 0.5   ALKPHOS 81 73       Lipids: No results for input(s): CHOL, HDL in the last 72 hours. Invalid input(s): LDLCALCU    INR:   Recent Labs     03/15/21  1647   INR 1.11           Assessment and Plan     1. Respiratory failure. CHF/COPD-O2/Duonebs PRN/tapering prednisone/Trilogy  2. Atrial fibrillation/S/P PPM-Amiodarone/Procardia XL/metorprolol  3. CAD/PVD-ASA/statin  4. Hyperlipidemia-on statin  5. Anemia-on iron  6. HTN-on meds monitor  7. GI-bowel regimen/PPI  8. BPH/history of bladder cancer-on meds  9. Vitamin D deficiency-on Vitamin D  10. DVT prophylaxis-SCDs  11. ETOH use-folic acid  12. Chronic kidney disease-monitor   13. Thrombocytopenia-monitor   14. Back pain/arthritis-Tylenol  15. PT/OT/Speech          Patient's functional assessment  Prior to hospitalization the patient was continent of bowel and incontinent of bladder    Current therapy  Requires PT, OT and/or speech therapy    Anticipated discharge approximately 20 days    Functional assessment  All notes from reehab data were reviewed regarding the patient's functional status.         Anticipated discharge setting  Home with home care    No clear barriers to discharge    The patient appears to be an appropriate candidate for inpatient rehabilitation    Sufficiently stable: Patient's condition is sufficiently stable at the time of admission to allow the patient to actively participate in an intensive rehabilitation program    Close medical supervision: A rehabilitation physician, or other licensed treating physician with specialized training and experience in inpatient rehabilitation, will conduct face-to-face visits with the patient a minimum of at least 3 days per week throughout the patient's stay    This patient requires close medical supervision for the active management of the ongoing conditions and potential complications stated in the admission note    Intensive rehabilitation nursing: The patient demonstrates the need for 24-hour rehabilitation nursing care for active management of the multiple medical issues documented in the admission note    Appropriate therapy needed: The patient requires the active and ongoing therapeutic intervention of at least 2 therapeutic disciplines, one of which must be physical or occupational therapy and/or speech therapy    Intensive therapy: The patient requires and is reasonably expected to actively participate in at least 3 hours of therapy per day at least 5 days per week, and expected to make measurable improvements that will be of practical value to improve the patient's functional capacity or adaptation to impairments. In addition, therapy treatments will begin within 36 hours from midnight of the day of the patient's admission to the inpatient rehabilitation facility    Expected duration and frequency therapy: 180 minutes per day, 5 days per week    Interdisciplinary team: The patient demonstrates the need for an interdisciplinary team for active management of the following medical issues including ataxia, motor planning, balance, disease management, elimination, endurance, family training, education, independent ADLs, pain management, precautions, range of motion, safety, strength, and transfers    I have reviewed the preadmission screening documents and concur with the findings.  I believe the patient meets criteria and is sufficiently stable to allow participation in the program. This requires

## 2021-03-16 NOTE — PROGRESS NOTES
4 Eyes Skin Assessment    Zohaib Jimenez is being assessed upon: Admission    I agree that I, Enid Soria, along with Dorina Meadows RN (either 2 RN's or 1 LPN and 1 RN) have performed a thorough Head to Toe Skin Assessment on the patient. ALL assessment sites listed below have been assessed. Areas assessed by both nurses:     [x]   Head, Face, and Ears   [x]   Shoulders, Back, and Chest  [x]   Arms, Elbows, and Hands   [x]   Coccyx, Sacrum, and Ischium  [x]   Legs, Feet, and Heels    Does the Patient have Skin Breakdown? Yes, wound(s) noted upon assessment. It is the responsibility of the Primary Nurse to assure that the following documentation, preventions, orders, and consults are complete on the above noted wound(s): Wound LDA initiated. LDA Flowsheet Documentation includes the Jeanna-wound, Wound Assessment, Measurements, Dressing Treatment, Drainage, and Color.     Ángel Prevention initiated: No  Wound Care Orders initiated: No    WOC nurse consulted for Pressure Injury (Stage 3,4, Unstageable, DTI, NWPT, and Complex wounds) and New or Established Ostomies: No        Primary Nurse eSignature: Enid Soria RN on 3/15/2021 at 7:10 PM      Co-Signer eSignature: Electronically signed by Bette Akhtar RN on 3/15/2021 at 7:20 PM

## 2021-03-16 NOTE — PROGRESS NOTES
Occupational Therapy   Occupational Therapy Initial Assessment  Date: 3/16/2021   Patient Name: Thuy Weller  MRN: [id number]     : 1940    Date of Service: 3/16/2021    Discharge Recommendations:  Home with Home health OT       Assessment   Performance deficits / Impairments: Decreased functional mobility ; Decreased endurance;Decreased strength;Decreased ROM; Decreased balance;Decreased ADL status; Decreased high-level IADLs  Assessment: Pt. would benefit from further skilled therapy. Pt has the potential to make great gains  Prognosis: Good  Decision Making: Low Complexity  OT Education: OT Role;Plan of Care;Precautions  Activity Tolerance  Activity Tolerance: Patient Tolerated treatment well  Safety Devices  Safety Devices in place: Yes  Type of devices: Left in bed;Bed alarm in place;Call light within reach           Patient Diagnosis(es): There were no encounter diagnoses. has a past medical history of Acute liver failure without hepatic coma, Back pain, Bladder cancer (HCC), Blood circulation, collateral, Carotid arterial disease ([address] CKD (chronic kidney disease), stage II, COPD with acute lower respiratory infection ([address] GERD (gastroesophageal reflux disease), Hyperlipidemia, Hypertension, Hypertension, Palliative care patient, Pneumonia due to infectious organism, Primary osteoarthritis of left knee, PVD (peripheral vascular disease) ([address] Tremor, and Type 2 diabetes mellitus with complication, without long-term current use of insulin ([address] has a past surgical history that includes back surgery;  Tonsillectomy and adenoidectomy; Colonoscopy (?); pr colonoscopy flx dx w/collj spec when pfrmd (N/A, 2017); pr revise median n/carpal tunnel surg (Left, 2018); pr thromboendartectmy neck,neck incis (Left, 2018); vascular surgery (2015); vascular surgery (2015); vascular surgery (2014); vascular surgery (2013); pr total knee arthroplasty (Left, 10/15/2018); vascular surgery (10/30/2018); vascular surgery (12/17/2018); and Cystoscopy (Bilateral, 12/19/2018).            Restrictions  Restrictions/Precautions  Restrictions/Precautions: Surgical Protocols, Fall Risk  Implants present? : Pacemaker  Position Activity Restriction  Other position/activity restrictions: PACEMAKER RESTRICTIONS TO LUE     Subjective   General  Chart Reviewed: Yes, Orders  Patient assessed for rehabilitation services?: Yes  Additional Pertinent Hx: Chronic low back pain, ETOH use, bladder cancer, home O2 prn  Diagnosis: Respiratory failure with hypoxia, recen pacemaker placement--3/13       03/16/21 1135   Pre Treatment Pain Screening   Pain at present 0   Scale Used Numeric Score   Pain Screening   Patient Currently in Pain No   Pain Assessment   Pain Assessment 0-10   Pain Level 0     Social/Functional History  Social/Functional History  Lives With: Spouse  Type of Home: House  Home Layout: One level  Home Access: Stairs to enter with rails  Entrance Stairs - Number of Steps: 2 steps from garage  Entrance Stairs - Rails: Both  Bathroom Shower/Tub: Walk-in shower  Bathroom Toilet: Handicap height(handicap toilet with grab bars in 2 out 3 bathrooms)  Bathroom Accessibility: Wheelchair accessible, Walker accessible  Home Equipment: 4 wheeled walker, Oxygen, Grab bars(O2 at home prn---very rarely)  ADL Assistance: Independent  Homemaking Assistance: Independent  Homemaking Responsibilities: Yes  Ambulation Assistance: Independent  Transfer Assistance: Independent  Active : Yes  Occupation: Retired  Type of occupation: railroad  Leisure & Hobbies: traveling, shooting range,       Objective   Vision: Within Functional Limits  Hearing: Within functional limits  Hearing Exceptions: Hard of hearing/hearing concerns    Orientation  Overall Orientation Status: Within Normal Limits      Cognition  Overall Cognitive Status: WNL        03/16/21 1135   LUE AROM (degrees)   LUE General AROM L shld limited to 90 deg elevaiton due to recent pacemaker, WFL distally   RUE PROM (degrees)   RUE PROM WNL   RUE AROM (degrees)   RUE AROM  WNL      03/16/21 1135   LUE Strength   LUE Strength Comment 3-/5 proximally----limited due to pacemaker precautions, WFLs distally   RUE Strength   Gross RUE Strength WFL     Plan   Plan  Current Treatment Recommendations: Strengthening, Patient/Caregiver Education & Training, Home Management Training, Equipment Evaluation, Education, & procurement, Balance Training, Self-Care / ADL, Safety Education & Training, Endurance Training, Functional Mobility Training      Goals  Short term goals  Time Frame for Short term goals: 1 week  Short term goal 1: pt will complete LB dressing with AE prn with CGA  Short term goal 2: pt will complete overall toileting with CGA  Short term goal 3: pt will complete simple ambulatory home making task with CGA while maintaining pacemaker precautions  Short term goal 4: pt will complete overall bathing with CGA  Short term goal 5: pt will complete 1-2 handed static standing act for 2 mins with supervision  Short term goal 6: pt will complete HEP x 5 occasions within range of pacemaker precautions to improve strength and endurance for ADLs  Long term goals  Time Frame for Long term goals : 2 weeks  Long term goal 1: pt will complete overall dressing with modified independence  Long term goal 2: pt will complete overall toileting with modified independence  Long term goal 3: pt will complete overall bathing with modified independence  Long term goal 4: pt will complete simple ambulatory home making task with modified independence  Long term goal 5: pt will complete HEP with independence  Long term goals 6: pt verbalize DME for home        03/16/21 1135   OT Individual Minutes   Time In 1135   Time Out 1200   Minutes 25   Time Code Minutes    Timed Code Treatment Minutes 10 Minutes       Electronically signed by Dora Lu OT on 3/16/2021 at 3:20 PM

## 2021-03-16 NOTE — PROGRESS NOTES
Occupational Therapy   Occupational Therapy Initial Assessment  Date: 3/16/2021   Patient Name: Noelle Connolly  MRN: [id number]     : 1940    Date of Service: 3/16/2021    Discharge Recommendations:  Home with Home health OT       Assessment   Performance deficits / Impairments: Decreased functional mobility ; Decreased endurance;Decreased strength;Decreased ROM; Decreased balance;Decreased ADL status; Decreased high-level IADLs  Assessment: Pt. would benefit from further skilled therapy. Pt has the potential to make great gains  Prognosis: Good  Decision Making: Low Complexity  OT Education: OT Role;Plan of Care;Precautions  Activity Tolerance  Activity Tolerance: Patient Tolerated treatment well  Safety Devices  Safety Devices in place: Yes  Type of devices: Left in bed;Bed alarm in place;Call light within reach           Patient Diagnosis(es): There were no encounter diagnoses. has a past medical history of Acute liver failure without hepatic coma, Back pain, Bladder cancer (HCC), Blood circulation, collateral, Carotid arterial disease ([address] CKD (chronic kidney disease), stage II, COPD with acute lower respiratory infection ([address] GERD (gastroesophageal reflux disease), Hyperlipidemia, Hypertension, Hypertension, Palliative care patient, Pneumonia due to infectious organism, Primary osteoarthritis of left knee, PVD (peripheral vascular disease) ([address] Tremor, and Type 2 diabetes mellitus with complication, without long-term current use of insulin ([address] has a past surgical history that includes back surgery;  Tonsillectomy and adenoidectomy; Colonoscopy (?); pr colonoscopy flx dx w/collj spec when pfrmd (N/A, 2017); pr revise median n/carpal tunnel surg (Left, 2018); pr thromboendartectmy neck,neck incis (Left, 2018); vascular surgery (2015); vascular surgery (2015); vascular surgery (2014); vascular surgery (2013); pr total knee arthroplasty (Left, 10/15/2018); vascular surgery (10/30/2018); vascular surgery (12/17/2018); and Cystoscopy (Bilateral, 12/19/2018).            Restrictions  Restrictions/Precautions  Restrictions/Precautions: Surgical Protocols, Fall Risk  Implants present? : Pacemaker  Position Activity Restriction  Other position/activity restrictions: PACEMAKER RESTRICTIONS TO LUE     Subjective   General  Chart Reviewed: Yes, Orders  Patient assessed for rehabilitation services?: Yes  Additional Pertinent Hx: Chronic low back pain, ETOH use, bladder cancer, home O2 prn  Diagnosis: Respiratory failure with hypoxia, recen pacemaker placement--3/13       Objective       03/16/21 1300   Eating   Assistance Needed Setup or clean-up assistance   CARE Score 5   Discharge Goal 6   Oral Hygiene   Assistance Needed Setup or clean-up assistance   CARE Score 5   Discharge Goal 6   [address] [facility] assistance   CARE Score 3   Discharge Goal 6   Shower/Bathe Self   Assistance Needed Partial/moderate assistance   CARE Score 3   Discharge Goal 6   Upper Body Dressing   Assistance Needed Partial/moderate assistance   Comment vc for tech, min A to pull over head   CARE Score 3   Discharge Goal 6   Lower Body Dressing   Assistance Needed Partial/moderate assistance   CARE Score 3   Discharge Goal 6   Putting On/Taking Off Footwear   Assistance Needed Substantial/maximal assistance   CARE Score 2   Discharge Goal 6      03/16/21 1300   Toilet Transfer   Assistance Needed Partial/moderate assistance   Comment min A   CARE Score 3   Discharge Goal 6      03/16/21 1300   Picking Up Object   Reason if not Attempted Not attempted due to medical condition or safety concerns   CARE Score 88   Discharge Goal 4     Plan   Plan  Current Treatment Recommendations: Strengthening, Patient/Caregiver Education & Training, Home Management Training, Equipment Evaluation, Education, & procurement, Balance Training, Self-Care / ADL, Safety

## 2021-03-16 NOTE — PROGRESS NOTES
[facility]  SPEECH THERAPY  [facility] 8 REHAB UNIT  Speech - Language - Cognitive Evaluation    TIME   900-1000    Name: Tab Friend  YOB: 1940  Gender: male  Referring Physician: Garth Boyle DIAGNOSIS(ES):    Diagnosis: PACEMAKER PLACEMENT, CHRONIC HYPOXIA AND HYPERCAPNIA      Additional Pertinent Hx: HTN, HEART FAILURE, CKDIII, PVD, GERD, COPD, ANEMIA, T2DM, A-FIB       History of Present Illness/Injury: This 79 y.o. male  with a past medical history of bladder cancer, HTN, CKD stage III, PVD, hyperlipidemia,CAD,COPD,CHF has oxygen at home but doesn't wear if often,ETOH use drinks 2 glasses of wine every night and former smoker. He presented to [facility] on 3/6/21 with shortness of breath. CXR done showed increased pulmonary vascular congestion and CT of chest showed bilateral pleural effusion. He was admitted to the hospitalist service with Acute on chronic hypoxemic and hypercapnic respiratory, COPD exacerbation and possible recurrent pneumonia. Pulmonology was consulted. He was seen by Dr. Vincent Keenan, who didn't feel he had pneumoinia st this time, but did recommend Trilogy at discharge d/t patient not tolerating Bi-PAP for questionable sleep apnea. Patient had an episode of A-Fib with RVR. Cardiology was consulted. He was seen by Dr. Jessika Lizarraga. He underwent cardiac catheterization by Dr. Sammi Parmar on 3/10/21 revealing 100% occlusion right coronary artery which appears chronic in nature well collateralized from the left no significant disease on the left. Dr. Sammi Parmar recommended placement of a dual-chamber pacemaker. Patient was in agreement and went or surgery on 3/13/21. He tolerated the procedure well. SPT was consulted to evaluate swallow. He was noted to have oropharyngeal phase dysphagia but no s/s of aspiration, he was placed on a regular diet with thin liquids. He is participating in both PT/OT.  He is felt to need a stay on Rehab to work towards his goal of returning Within Functional Limits  Patient is aware of call light and was able to verbalize he can not get up without assistance. Pragmatics: Pragmatics: Within functional limits    Writing:   Dominant Hand: Right   Within functional limits    VISION/HEARING:    Vision: Within Functional Limits   Hearing: Exceptions to [facility]    REHAB POTENTIAL: [] Excellent [x] Good [] Fair  [] Poor    EDUCATION:   Learner: [x]Patient [] Significant other [] Son/Daughter [] Parent     [] Other:   Education: [x] Reviewed results and recommendations of this evaluation     [] Reviewed diet and strategies     [] Reviewed signs, symptoms and risk of aspiration     [] Demonstrated how to thick liquid appropriately. [x] Reviewed goals and Plan of Care     [] OTHER:   Method: [x] Discussion [] Demonstration [] Hand-out     [] OTHER:   Evaluation of Education:     [x] Verbalizes understanding [] Demonstrates with assistance     [] Demonstrates without assistance []Needs further instruction     [] No evidence of learning  [] Family not present    PLAN / TREATMENT RECOMMENDATIONS:  [x] No further speech therapy services indicated. []  Further Speech therapy services reccommended       See POC for Goals.

## 2021-03-16 NOTE — PROGRESS NOTES
[facility]  Nurse  Consult Note       NAME:  Mary Alice Hernandez  MEDICAL RECORD NUMBER:  [id number]  AGE: 79 y.o. GENDER: male  : 1940  TODAY'S DATE:  3/16/2021    Subjective   Reason for Wound Nurse Evaluation and Assessment: Right Buttock Wound      Mary Alice Hernandez is a 79 y.o. male referred by:   [] Physician  [x] Nursing  [] Other:     Wound Identification:  Wound Type: Moisture Associated Skin Damage  Contributing Factors: shear force, obesity and Moisture    Wound History: Patient admitted to in patient rehab with wound  Current Wound Care Treatment:  Barrier Cream    Patient Goal of Care:  [x] Wound Healing  [] Odor Control  [] Palliative Care  [] Pain Control   [] Other:         PAST MEDICAL HISTORY        Diagnosis Date    Acute liver failure without hepatic coma 10/23/2018    Back pain     \"with tired legs as a result\"    Bladder cancer ([address] 2018    Blood circulation, collateral     Carotid arterial disease ([address]     recent surgery    CKD (chronic kidney disease), 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/2018    COPD with acute lower respiratory infection ([address] 2021    GERD (gastroesophageal reflux disease)     Hyperlipidemia     Hypertension     Hypertension     Palliative care patient 10/23/2018    Pneumonia due to infectious organism 2018    Primary osteoarthritis of left knee 10/14/2018    PVD (peripheral vascular disease) ([address]     Tremor     Tremor on Right side x 1-2 weeks per stepdaughter    Type 2 diabetes mellitus with complication, without long-term current use of insulin ([address] 2021       PAST SURGICAL HISTORY    Past Surgical History:   Procedure Laterality Date    BACK SURGERY      COLONOSCOPY  ?     CYSTOSCOPY Bilateral 2018    CYSTOSCOPY, BIOSPY FULGURATION OF BLADDER TUMOR POSSIBLE TURBT, RETROGRADE PYELOGRAM performed by Leon Portillo MD at [address] [place] [address] [place] COLONOSCOPY FLX DX W/COLLJ SPEC WHEN PFRMD N/A 2017    Dr Jeff Paredes internal hemorrhoids, diverticular disease-HP-No recall (age)   [place] REVISE MEDIAN N/CARPAL TUNNEL SURG Left 7/18/2018    OPEN CARPAL TUNNEL RELEASE performed by Beth Condon MD at [address] Left 8/28/2018    LEFT CAROTID ENDARTERECTOMY WITH VEIN PATCH ANGIOPLASTY AND COMPLETION ANGIOGRAM performed by Toña Medellin MD at [address] [facility] Left 10/15/2018    LEFT COMPLEX TOTAL KNEE ARTHROPLASTY performed by Beth Condon MD at [facility] VASCULAR SURGERY  04/21/2015    Kana BESS Ultrasound guided access of left common femoral artery. Aortogram.Diagnostic right lower extremity arteriogram.Radiologic supervision and interpretation.  VASCULAR SURGERY  01/13/2015    Kana Mirza M.D Atherectomy,angioplasty,and stenting of left superficial femoral artery.  VASCULAR SURGERY  03/11/2014    Kana Mirza M.D. Ultrasound-guided access of right common femoral artery. Aortogram.Left lower extremity arteriogram.Atherectomy and angioplasty of left superficial femoral artery. Radiologic supervision and interpretation.  VASCULAR SURGERY  01/18/2013    Kana LIU. Aortogram.Multistation arteriogram right lower extremity. Laser atherectomy and angioplasty of right superficial femoral artery. Selective catheterization of right tibioperoneal trunk. Angioplasty of peroneal artery and tibioperoneal trunk.  VASCULAR SURGERY  10/30/2018    SJS. Ultrasound guided cannulation of right internal vein. Placement of right internal jugular vein tunneled dialysis catheter bard equistream xk 23cm tip to cuff    VASCULAR SURGERY  12/17/2018    SJS. Removal of tunneled dilaysis catheter right internal jugular vein.        FAMILY HISTORY    Family History   Problem Relation Age of Onset    Colon Cancer Father     Diabetes Brother     Colon Polyps Neg Hx     Liver Cancer Neg Hx     Liver Disease Neg Hx     Esophageal Cancer Neg Hx  Rectal Cancer Neg Hx     Stomach Cancer Neg Hx        SOCIAL HISTORY    Social History     Tobacco Use    Smoking status: Former Smoker     Quit date: 6/3/2003     Years since quittin.7    Smokeless tobacco: Never Used   Substance Use Topics    Alcohol use: Yes     Alcohol/week: 12.0 standard drinks     Types: 12 Glasses of wine per week     Comment: 2 glasses of wine every night    Drug use: No       ALLERGIES    Allergies   Allergen Reactions    Eliquis [Apixaban] Other (See Comments)     \"almost bled to death\"    Promethazine Hcl Other (See Comments)     He became encephalopathic for several hours and was not responsive. MEDICATIONS    No current facility-administered medications on file prior to encounter.       Current Outpatient Medications on File Prior to Encounter   Medication Sig Dispense Refill    calcium carbonate (TUMS) 500 MG chewable tablet Take 1 tablet by mouth 3 times daily as needed for Heartburn 60 tablet 0    guaiFENesin (ROBITUSSIN) 100 MG/5ML syrup Take 10 mLs by mouth every 4 hours as needed for Cough 118 mL 0    famotidine (PEPCID) 20 MG tablet Take 1 tablet by mouth daily 60 tablet 0    aspirin 81 MG EC tablet Take 1 tablet by mouth daily 30 tablet 0    glipiZIDE (GLUCOTROL) 5 MG tablet Take 1 tablet by mouth every morning (before breakfast) 60 tablet 0    atorvastatin (LIPITOR) 20 MG tablet Take 1 tablet by mouth daily 30 tablet 0    metoprolol succinate (TOPROL XL) 100 MG extended release tablet Take 1 tablet by mouth daily 30 tablet 0    NIFEdipine (ADALAT CC) 60 MG extended release tablet Take 1 tablet by mouth daily 30 tablet 0    tamsulosin (FLOMAX) 0.4 MG capsule Take 1 capsule by mouth daily 30 capsule 0    pantoprazole (PROTONIX) 40 MG tablet Take 1 tablet by mouth daily 30 tablet 0    trospium (SANCTURA) 20 MG tablet Take 1 tablet by mouth nightly 30 tablet 0       Objective    /72   Pulse 66   Temp 97.6 °F (36.4 °C)   Resp 22   Ht 6' (1.829 m)   Wt 250 lb (113.4 kg)   SpO2 94%   BMI 33.91 kg/m²     LABS:  WBC:    Lab Results   Component Value Date    WBC 9.5 03/16/2021     H/H:    Lab Results   Component Value Date    HGB 7.1 03/16/2021    HCT 23.4 03/16/2021     PTT:    Lab Results   Component Value Date    APTT 36.0 03/06/2021   [APTT}  PT/INR:    Lab Results   Component Value Date    PROTIME 14.3 03/15/2021    INR 1.11 03/15/2021     HgBA1c:    Lab Results   Component Value Date    LABA1C 6.4 02/13/2021       Assessment   Ángel Risk Score: Ángel Scale Score: 18    Patient Active Problem List   Diagnosis Code    Diverticulitis of large intestine with perforation without bleeding K57.20    Generalized abdominal pain R10.84    Colonic diverticular abscess K57.20    Bilateral carotid artery stenosis I65.23    Atherosclerosis of native artery of both lower extremities with intermittent claudication ([facility]) I70.213    Carotid stenosis, asymptomatic, left I65.22    Primary osteoarthritis of left knee M17.12    Arthritis of knee M17.10    Essential hypertension I10    Pure hypercholesterolemia E78.00    Slow transit constipation K59.01    Iron deficiency anemia D50.9    GERD (gastroesophageal reflux disease) K21.9    Acute liver failure without hepatic coma K72.00    Acute kidney injury ([address] N17.9    CHF (congestive heart failure) ([facility]) I50.9    Bilateral pleural effusion J90    Elevated troponin R77.8    Palliative care patient Z51.5    Shock liver K72.00    Acute renal failure (HCC) N17.9    BPH associated with nocturia N40.1, R35.1    Bleeding diathesis (HCC) D69.9    Epistaxis R04.0    Acute blood loss anemia D62    Melena K92.1    Thrombocytopenia (HCC) D69.6    Hypocalcemia E83.51    Pneumonia due to infectious organism J18.9    Bladder cancer (HCC) C67.9    Postoperative pain G89.18    History of bladder cancer Z85.51    Severe sepsis (HCC) A41.9, R65.20    Acute on chronic respiratory failure (HCC) J96.20  Acute on chronic kidney failure (HCC) N17.9, N18.9    Hypoxemia S91.37    Metabolic acidosis R92.5    Acidosis, metabolic, with respiratory acidosis E87.4    Acute respiratory failure ([facility]) J96.00    Type 2 diabetes mellitus with complication, without long-term current use of insulin ([facility]) E11.8    Weakness R53.1    Other dysphagia R13.19    Chronic midline low back pain without sciatica M54.5, G89.29    COPD with acute lower respiratory infection ([facility]) J44.0    Chronic kidney disease N18.9    Recurrent pneumonia J18.9    Nonsustained ventricular tachycardia ([facility]) I47.2    Atrial fibrillation ([facility]) I48.91    Vitamin D deficiency E55.9    Anemia D64.9    Alcohol use Z72.89    Pacemaker Z95.0       Measurements:  Incision 10/15/18 Leg Left (Active)   Number of days: 883       Incision 10/15/18 Knee Left (Active)   Number of days: 883       Wound 03/16/21 Buttocks Right Moisture Associated Skin Damage w/ Shearing (Active)   Wound Image   03/16/21 1153   Wound Etiology Other 03/16/21 1153   Dressing Status Other (Comment) 03/16/21 1153   Wound Cleansed Soap and water 03/16/21 1153   Dressing/Treatment Zinc paste 03/16/21 1153   Wound Assessment Fibrin;Pink/red 03/16/21 1153   Drainage Amount None 03/16/21 1153   Jeanna-wound Assessment Blanchable erythema 03/16/21 1153   Margins Attached edges 03/16/21 1153   Wound Thickness Description not for Pressure Injury Partial thickness 03/16/21 1153   Number of days: 0            Response to treatment:  Well tolerated by patient. Pain Assessment:  Severity:  0 / 10  Quality of pain: N/A  Wound Pain Timing/Severity: none  Premedicated: N/A    Plan   Plan of Care: Wound 03/16/21 Buttocks Right Moisture Associated Skin Damage w/ Shearing-Dressing/Treatment: Zinc paste    Specialty Bed Required : No   [] Low Air Loss   [] Pressure Redistribution  [] Fluid Immersion  [] Bariatric  [] Total Pressure Relief  [] Other:     Current Diet: DIET CARDIAC;   Dietician consult:  Yes    Discharge Plan:  Placement for patient upon discharge: home with support    Patient appropriate for Outpatient [address]: No    Referrals:  []   [] [facility]  [] Supplies  [] Other    Patient/Caregiver Teaching:  Level of patient/caregiver understanding able to:   [] Indicates understanding       [] Needs reinforcement  [] Unsuccessful      [] Verbal Understanding  [] Demonstrated understanding       [] No evidence of learning  [] Refused teaching         [] N/A       Requested by primary nurse to assess wound on right buttock. There is a small partial thickness wound that is a 1.5 x 3 x 0.1 cm area of moisture associated skin damage with shearing of the epidermis. This appears to be in a state of healing. There is some blanching redness noted to the peirwound area. Recommend applying barrier cream BID and prn instead of sacral foam dressings which are increasing moisture due to patient's body habitus at this location.      Electronically signed by   Kayleigh Estes RN, [address] [place] [address],[address] [facility] 3/16/2021

## 2021-03-16 NOTE — PLAN OF CARE
Problem: Falls - Risk of:  Goal: Will remain free from falls  Description: Will remain free from falls  Outcome: Ongoing  Goal: Absence of physical injury  Description: Absence of physical injury  Outcome: Ongoing     Problem: IP BLADDER/VOIDING  Goal: LTG - patient will complete bladder elimination  Outcome: Ongoing  Goal: LTG - Patient will utilize adaptive techniques/equipment to complete bladder elimination  Outcome: Ongoing  Goal: LTG - patient will achieve acceptable level of continence  Outcome: Ongoing  Goal: STG - Patient demonstrates ability to take care of indwelling catheter  Outcome: Ongoing  Goal: STG - patient demonstrates self-cath technique using clean technique and care of the catheter  Outcome: Ongoing  Goal: STG - Patient demonstrates no accidents  Outcome: Ongoing  Goal: STG - Patient will state signs and symptoms of UTI  Outcome: Ongoing  Goal: STG - patient will be able to empty bladder  Outcome: Ongoing  Goal: STG - Patient demonstrates understanding of self catheterization schedule by completing task on time  Outcome: Ongoing  Goal: STG - patient participates in bladder program by expressing need to void  Outcome: Ongoing  Goal: STG - Patient verbalizes understanding of catheter care indwelling/intermittent  Outcome: Ongoing  Goal: STG - patient participates in bladder program by adhering to implemented toileting schedule  Outcome: Ongoing     Problem: IP BOWEL ELIMINATION  Goal: LTG - patient will utilize adaptive techniques/equipment to complete bowel elimination  Outcome: Ongoing  Goal: LTG - patient will have regular and routine bowel evacuation  Outcome: Ongoing  Goal: STG - patient will be accident free  Outcome: Ongoing  Goal: STG - Patient demonstrates care and management of ostomy bag  Outcome: Ongoing  Goal: STG - Patient participates in bowel management program  Outcome: Ongoing  Goal: STG - patient maintains skin integrity  Outcome: Ongoing  Goal: STG - Patient will verbalize signs and symptoms of constipation and how to prevent/alleviate  Outcome: Ongoing  Goal: STG - patient will be continent of stool  Outcome: Ongoing  Goal: STG - Patient completes digital stimulation technique  Outcome: Ongoing  Goal: STG - Patient verbalizes knowledge about relationship between diet, fluid intake, activity and medication on constipation  Outcome: Ongoing     Problem: IP BREATHING  Goal: LTG - patient will mobilize secretions and maintain airway  Outcome: Ongoing  Goal: LTG - Patient/caregiver will demonstrate/perform proper techniques to maintain patent airway  Outcome: Ongoing  Goal: LTG - patient/caregiver will demonstrate/perform improved or stable self care abilities within physical limitations  Outcome: Ongoing  Goal: STG - Patient/caregiver will maintain patent airway  Outcome: Ongoing  Goal: STG - respiratory rate and effort will be within normal limits for the patient  Outcome: Ongoing  Goal: STG - patient/caregiver will be able to verbalize oxygen safety precautions  Outcome: Ongoing  Goal: STG - Patient/caregiver demonstrates correct suctioning technique  Outcome: Ongoing  Goal: STG - patient will utilize incentive spirometer  Outcome: Ongoing  Goal: STG - Patient performs or directs assisted coughing  Outcome: Ongoing  Goal: STG - patient can administer MDI's  Outcome: Ongoing  Goal: STG - Patient can utilize incentive spirometer  Outcome: Ongoing  Goal: STG - family can complete suctioning  Outcome: Ongoing     Problem: NUTRITION  Goal: Patient maintains adequate hydration  Outcome: Ongoing  Goal: Patient maintains weight  Outcome: Ongoing  Goal: Patient/Family demonstrates understanding of diet  Outcome: Ongoing  Goal: Patient/Family independently completes tube feeding  Outcome: Ongoing  Goal: Patient will have no more than 5 lb weight change during LOS  Outcome: Ongoing  Goal: Patient will utilize adaptive techniques to administer nutrition  Outcome: Ongoing  Goal: Patient will verbalize dietary restrictions  Outcome: Ongoing     Problem: SAFETY  Goal: LTG - patient will adhere to hip precautions during ADL's and transfers  Outcome: Ongoing  Goal: LTG - Patient will demonstrate safety requirements appropriate to situation/environment  Outcome: Ongoing  Goal: LTG - patient will utilize safety techniques  Outcome: Ongoing  Goal: STG - patient locks brakes on wheelchair  Outcome: Ongoing  Goal: STG - Patient uses call light consistently to request assistance with transfers  Outcome: Ongoing  Goal: STG - patient uses gait belt during all transfers  Outcome: Ongoing     Problem: SKIN INTEGRITY  Goal: LTG - Patient will be free from infection  Outcome: Ongoing  Goal: LTG - patient will maintain/improve skin integrity through proper skin care techniques  Outcome: Ongoing  Goal: LTG - Patient will demonstrate appropriate pressure relief techniques  Outcome: Ongoing  Goal: LTG - patient will demonstrate appropriate skin care techniques  Outcome: Ongoing  Goal: LTG - Patient will be free from infection  Outcome: Ongoing  Goal: STG - Patient demonstrates skin care/treatment/dressing change  Outcome: Ongoing  Goal: STG - patient will maintain good skin integrity  Outcome: Ongoing  Goal: STG - Patient exhibits signs of wound healing.   Outcome: Ongoing  Goal: STG - patient demonstrates pressure reduction techniques  Outcome: Ongoing  Goal: STG - Patient demonstrates preventative skin care measures  Outcome: Ongoing     Problem: PAIN  Goal: LTG - Patient will manage pain with the appropriate technique/Intervention  Outcome: Ongoing  Goal: LTG - Patient will demonstrate intervention for managing pain  Outcome: Ongoing  Goal: STG - Patient will reduce or eliminate use of analgesics  Outcome: Ongoing  Goal: STG - pain is manageable through therapies  Outcome: Ongoing  Goal: STG - Patient will verbalize an acceptable level of pain  Outcome: Ongoing  Goal: STG - patients pain is managed to allow active participation in daily activities  Outcome: Ongoing  Goal: STG - Patient will increase activity level  Outcome: Ongoing  Goal: STG - patient verbalizes a reduction in pain level  Outcome: Ongoing

## 2021-03-16 NOTE — PROGRESS NOTES
Physical Therapy EVALUATION Note  DATE:  3/16/2021  NAME:  Ashley Christina  :  1940  (79 y.o.,male)  MRN:  [id number]    HEIGHT:  Height: 6' (182.9 cm)  WEIGHT:  Weight: 250 lb (113.4 kg)    PATIENT DIAGNOSIS(ES):    Diagnosis: PACEMAKER PLACEMENT, CHRONIC HYPOXIA AND HYPERCAPNIA     Additional Pertinent Hx: HTN, HEART FAILURE, CKDIII, PVD, GERD, COPD, ANEMIA, T2DM, A-FIB   RESTRICTIONS/PRECAUTIONS:    Restrictions/Precautions  Restrictions/Precautions: Surgical Protocols, Fall Risk  Implants present? : Pacemaker  Position Activity Restriction  Other position/activity restrictions: PACEMAKER RESTRICTIONS TO LUE   OVERALL  ORIENTATION STATUS:     PAIN:  Pain Level: 0                    NEUROLOGICAL                          STRENGTH  Strength RLE  Strength RLE: WFL  Strength LLE  Strength LLE: WFL  ROM  AROM RLE (degrees)  RLE AROM: WFL  AROM LLE (degrees)  LLE AROM : WFL  POSTURE/BALANCE          ACTIVITY TOLERANCE  Activity Tolerance  Activity Tolerance: Patient Tolerated treatment well, Patient limited by endurance, Patient limited by fatigue      BED MOBILITY  Bed Mobility  Bridging: Stand by assistance  Rolling: Stand by assistance(LEFT SIDE OF BED; TRIPLANAR )  Supine to Sit: Stand by assistance(LEFT SIDE OF BED; TRIPLANAR )  Sit to Supine: Stand by assistance(LEFT SIDE OF BED; TRIPLANAR )  Comment: REQUIRED INCREASED REST BREAKS DUE TO POOR ENDURANCE AND SOB   TRANSFERS  Sit to Stand: Contact guard assistance(RW)      Bed to Chair: Contact guard assistance        WHEELCHAIR PROPULSION 1  Propulsion 1  Propulsion: Manual  Level: Level Tile  Method: CARLITO HERNÁNDEZ  Level of Assistance: Stand by assistance, Contact guard assistance  Description/ Details: 35 FT   Distance: R AND L TURNS. PT FATIGUES MORE EASILY.  SLOW LYNNETTE   WHEELCHAIR PROPULSION 2     AMBULATION 1     AMBULATION 2     STAIRS       GOALS:  Short term goals  Time Frame for Short term goals: 1 WEEK   Short term goal 1: SUPERVISION BED MOBILITY Short term goal 2: SUPEVISION STS TF W/ RW   Short term goal 3: CGA 50 FT AMBULATION WITH RW   Short term goal 4: CGA CAR TF WITH RW   Short term goal 5: SUPERVISION HEP     Long term goals  Time Frame for Long term goals : 2-3 WEEKS   Long term goal 1: INDEPENDENT BED MOBILITY   Long term goal 2: INDEPENDENT STS TF   Long term goal 3: SUPERVISION 100 FT AMBULATION RW FOR SAFETY DUE TO DECREASED CV ENDURANCE   Long term goal 4: INDEPENDENT STAIR NEGOTIATION 4 STEPS  Long term goal 5: INDEPENDENT HEP  HOME LIVING:     Type of Home: House  Home Layout: One level  Home Access: Stairs to enter with rails  Entrance Stairs - Number of Steps: 2 steps from garage  Entrance Stairs - Rails: Both  ASSESSMENT (IMPAIRMENTS/BARRIERS): Body structures, Functions, Activity limitations: Decreased functional mobility , Decreased ADL status, Decreased endurance, Decreased posture  Assessment: PT REQUIRES INCREASED REST BREAKS BETWEEN BOUTS OF BED MOBILITY, CHANGING/DRESSING, AND TF DUE TO DECREASED ENDURANCE. WILL REQUIRE SKILLED THERAPY TO IMPROVE CV ENDURANCE TO ASSIST WITH COMMUNITY AMBULATION, TF, BED MOBILITY, AND OTHER ACTIVITIES TO SAFELY D/C HOME.     Activity Tolerance: Patient Tolerated treatment well, Patient limited by endurance, Patient limited by fatigue  Specific instructions for Next Treatment: ASSESS AMBULATION, CAR TF AND BEGIN LE STRENGTHENING   PLAN:  Plan  Times per week: 5-6  Times per day: (1-2)  Plan weeks: 2-3  Specific instructions for Next Treatment: ASSESS AMBULATION, CAR TF AND BEGIN LE STRENGTHENING   Current Treatment Recommendations: Strengthening, ROM, Balance Training, Functional Mobility Training, Transfer Training, Endurance Training, Wheelchair Mobility Training, Gait Training, Stair training, Home Exercise Program, Safety Education & Training, Patient/Caregiver Education & Training  Discharge Recommendations: Continue to assess pending progress  PATIENT REQUIRES AND IS REASONABLY EXPECTED TO ACTIVELY PARTICIPATE IN AT LEAST 3 HOURS OF INTENSIVE THERAPY PER DAY AT LEAST 5 DAYS PER WEEK, AND BE EXPECTED TO MAKE MEASURABLE IMPROVEMENT THAT WILL BE OF PRACTICAL VALUE TO IMPROVE THE PATIENT'S FUNCTIONAL CAPACITY OR ADAPTATION TO IMPAIRMENTS.    PATIENT GOAL FOR REHAB:  RETURN TO PRIOR LEVEL OF FUNCTION       IRF/RHONDA  Roll Left and Right  Assistance Needed: Supervision or touching assistance  CARE Score: 4  Discharge Goal: Independent    Sit to Lying  Assistance Needed: Supervision or touching assistance  CARE Score: 4  Discharge Goal: Independent    Lying to Sitting on Side of Bed  Assistance Needed: Supervision or touching assistance  CARE Score: 4  Discharge Goal: Independent    Sit to Stand  Assistance Needed: Supervision or touching assistance  CARE Score: 4  Discharge Goal: Independent    Chair/Bed-to-Chair Transfer  Assistance Needed: Supervision or touching assistance  CARE Score: 4  Discharge Goal: Independent    Car Transfer  Reason if not Attempted: Not attempted due to medical condition or safety concerns  CARE Score: 88  Discharge Goal: Independent    Walk 10 Feet  Reason if not Attempted: Not attempted due to medical condition or safety concerns  CARE Score: 88  Discharge Goal: Independent    Walk 50 Feet with Two Turns  Reason if not Attempted: Not attempted due to medical condition or safety concerns  CARE Score: 88  Discharge Goal: Independent    Walk 150 Feet  Reason if not Attempted: Not attempted due to medical condition or safety concerns  CARE Score: 88  Discharge Goal: Independent    Walking 10 Feet on Uneven Surfaces  Reason if not Attempted: Not attempted due to medical condition or safety concerns  CARE Score: 88  Discharge Goal: Supervision or touching assistance    1 Step (Curb)  Reason if not Attempted: Not attempted due to medical condition or safety concerns  CARE Score: 88  Discharge Goal: Independent    4 Steps  Reason if not Attempted: Not attempted due to medical condition or safety concerns  CARE Score: 88  Discharge Goal: Independent    12 Steps  Reason if not Attempted: Not attempted due to medical condition or safety concerns  CARE Score: 88  Discharge Goal: Not Attempted    Wheel 50 Feet with Two Turns  Assistance Needed: Supervision or touching assistance  CARE Score: 4  Discharge Goal: Independent    Wheel 150 Feet  Reason if not Attempted: Not attempted due to medical condition or safety concerns  CARE Score: 88  Discharge Goal: Supervision or touching assistance      LAST TREATMENT TIME  PT Individual Minutes  Time In: 1000  Time Out: 1100  Minutes: 61

## 2021-03-16 NOTE — PROGRESS NOTES
Speech Language Pathology  Facility/Department: [facility] REHAB UNIT   CLINICAL BEDSIDE SWALLOW EVALUATION    NAME: Vannie Fleischer  : 1940  MRN: [id number]    ADMISSION DATE: 3/15/2021  ADMITTING DIAGNOSIS: has Diverticulitis of large intestine with perforation without bleeding; Generalized abdominal pain; Colonic diverticular abscess; Bilateral carotid artery stenosis; Atherosclerosis of native artery of both lower extremities with intermittent claudication ([address] Carotid stenosis, asymptomatic, left; Primary osteoarthritis of left knee; Arthritis of knee; Essential hypertension; Pure hypercholesterolemia; Slow transit constipation; Iron deficiency anemia; GERD (gastroesophageal reflux disease); Acute liver failure without hepatic coma; Acute kidney injury [facility]); CHF (congestive heart failure) ([address] Bilateral pleural effusion; Elevated troponin; Palliative care patient; Shock liver; Acute renal failure (HCC); BPH associated with nocturia; Bleeding diathesis ([address] Epistaxis; Acute blood loss anemia; Melena; Thrombocytopenia ([address] Hypocalcemia; Pneumonia due to infectious organism; Bladder cancer ([address] Postoperative pain; History of bladder cancer; Severe sepsis ([address] Acute on chronic respiratory failure ([address] Acute on chronic kidney failure ([address] Hypoxemia; Metabolic acidosis; Acidosis, metabolic, with respiratory acidosis; Acute respiratory failure ([address] Type 2 diabetes mellitus with complication, without long-term current use of insulin ([address] Weakness; Other dysphagia; Chronic midline low back pain without sciatica; COPD with acute lower respiratory infection ([address] Chronic kidney disease; Recurrent pneumonia; Nonsustained ventricular tachycardia ([address] Atrial fibrillation ([address] Vitamin D deficiency; Anemia;  Alcohol use; and Pacemaker on their problem list.      Date of Eval: 3/16/2021  Evaluating Therapist: Raghav Wright    Current Diet level:  Current Diet : Regular  Current Liquid Diet : Comment: Patient presenting with functional mastication and oral prep for more solid consistencies. Indicators of Pharyngeal Phase Dysfunction   Pharyngeal Phase  Pharyngeal Phase: Exceptions  Indicators of Pharyngeal Phase Dysfunction  Decreased Laryngeal Elevation: All  Pharyngeal Phase   Pharyngeal: Patient presents with mild decreased laryngeal elevation for airway protection, however no overt s/s of aspiration observed with any regular solid or thin liquid trials via cup.       Education  Patient Education Response: Verbalizes understanding       Therapy Time  SLP Individual Minutes  Time In: 0900  Time Out: 1000  Minutes: 60    Electronically signed by HALINA Russo on 3/16/2021 at 1:24 PM

## 2021-03-17 LAB
BACTERIA: NEGATIVE /HPF
BILIRUBIN URINE: NEGATIVE
BLOOD, URINE: ABNORMAL
CLARITY: CLEAR
COLOR: YELLOW
CRYSTALS, UA: ABNORMAL /HPF
EPITHELIAL CELLS, UA: 0 /HPF (ref 0–5)
GLUCOSE BLD-MCNC: 160 MG/DL (ref 70–99)
GLUCOSE BLD-MCNC: 225 MG/DL (ref 70–99)
GLUCOSE BLD-MCNC: 240 MG/DL (ref 70–99)
GLUCOSE BLD-MCNC: 260 MG/DL (ref 70–99)
GLUCOSE URINE: NEGATIVE MG/DL
HCT VFR BLD CALC: 23.3 % (ref 42–52)
HEMOGLOBIN: 7 G/DL (ref 14–18)
HYALINE CASTS: 2 /HPF (ref 0–8)
KETONES, URINE: NEGATIVE MG/DL
LEUKOCYTE ESTERASE, URINE: NEGATIVE
MCH RBC QN AUTO: 28.8 PG (ref 27–31)
MCHC RBC AUTO-ENTMCNC: 30 G/DL (ref 33–37)
MCV RBC AUTO: 95.9 FL (ref 80–94)
NITRITE, URINE: NEGATIVE
PDW BLD-RTO: 16.2 % (ref 11.5–14.5)
PERFORMED ON: ABNORMAL
PH UA: 5 (ref 5–8)
PLATELET # BLD: 116 K/UL (ref 130–400)
PMV BLD AUTO: 12.6 FL (ref 9.4–12.4)
PROTEIN UA: 30 MG/DL
RBC # BLD: 2.43 M/UL (ref 4.7–6.1)
RBC UA: 1 /HPF (ref 0–4)
SPECIFIC GRAVITY UA: 1.02 (ref 1–1.03)
UROBILINOGEN, URINE: 0.2 E.U./DL
WBC # BLD: 9.2 K/UL (ref 4.8–10.8)
WBC UA: 1 /HPF (ref 0–5)

## 2021-03-17 PROCEDURE — 97110 THERAPEUTIC EXERCISES: CPT

## 2021-03-17 PROCEDURE — 85027 COMPLETE CBC AUTOMATED: CPT

## 2021-03-17 PROCEDURE — 36415 COLL VENOUS BLD VENIPUNCTURE: CPT

## 2021-03-17 PROCEDURE — 97535 SELF CARE MNGMENT TRAINING: CPT

## 2021-03-17 PROCEDURE — 97530 THERAPEUTIC ACTIVITIES: CPT

## 2021-03-17 PROCEDURE — 97116 GAIT TRAINING THERAPY: CPT

## 2021-03-17 PROCEDURE — 2580000003 HC RX 258: Performed by: PSYCHIATRY & NEUROLOGY

## 2021-03-17 PROCEDURE — 2700000000 HC OXYGEN THERAPY PER DAY

## 2021-03-17 PROCEDURE — 6370000000 HC RX 637 (ALT 250 FOR IP): Performed by: HOSPITALIST

## 2021-03-17 PROCEDURE — 81001 URINALYSIS AUTO W/SCOPE: CPT

## 2021-03-17 PROCEDURE — 6360000002 HC RX W HCPCS: Performed by: PSYCHIATRY & NEUROLOGY

## 2021-03-17 PROCEDURE — 82947 ASSAY GLUCOSE BLOOD QUANT: CPT

## 2021-03-17 PROCEDURE — 99232 SBSQ HOSP IP/OBS MODERATE 35: CPT | Performed by: PSYCHIATRY & NEUROLOGY

## 2021-03-17 PROCEDURE — 1180000000 HC REHAB R&B

## 2021-03-17 PROCEDURE — 6370000000 HC RX 637 (ALT 250 FOR IP): Performed by: PSYCHIATRY & NEUROLOGY

## 2021-03-17 RX ORDER — SODIUM CHLORIDE 0.9 % (FLUSH) 0.9 %
10 SYRINGE (ML) INJECTION 2 TIMES DAILY
Status: DISCONTINUED | OUTPATIENT
Start: 2021-03-17 | End: 2021-03-26

## 2021-03-17 RX ADMIN — ASPIRIN 81 MG: 81 TABLET, FILM COATED ORAL at 09:08

## 2021-03-17 RX ADMIN — ATORVASTATIN CALCIUM 20 MG: 20 TABLET, FILM COATED ORAL at 09:06

## 2021-03-17 RX ADMIN — SODIUM CHLORIDE, PRESERVATIVE FREE 10 ML: 5 INJECTION INTRAVENOUS at 21:04

## 2021-03-17 RX ADMIN — FOLIC ACID 1 MG: 1 TABLET ORAL at 09:08

## 2021-03-17 RX ADMIN — PREDNISONE 10 MG: 20 TABLET ORAL at 09:07

## 2021-03-17 RX ADMIN — AMIODARONE HYDROCHLORIDE 200 MG: 200 TABLET ORAL at 09:08

## 2021-03-17 RX ADMIN — INSULIN LISPRO 3 UNITS: 100 INJECTION, SOLUTION INTRAVENOUS; SUBCUTANEOUS at 11:59

## 2021-03-17 RX ADMIN — MICONAZOLE NITRATE: 2 POWDER TOPICAL at 21:08

## 2021-03-17 RX ADMIN — FERROUS SULFATE TAB 325 MG (65 MG ELEMENTAL FE) 325 MG: 325 (65 FE) TAB at 09:08

## 2021-03-17 RX ADMIN — IRON SUCROSE 100 MG: 20 INJECTION, SOLUTION INTRAVENOUS at 10:34

## 2021-03-17 RX ADMIN — PANTOPRAZOLE SODIUM 40 MG: 40 TABLET, DELAYED RELEASE ORAL at 09:08

## 2021-03-17 RX ADMIN — LINACLOTIDE 145 MCG: 145 CAPSULE, GELATIN COATED ORAL at 09:08

## 2021-03-17 RX ADMIN — TAMSULOSIN HYDROCHLORIDE 0.4 MG: 0.4 CAPSULE ORAL at 21:05

## 2021-03-17 RX ADMIN — MICONAZOLE NITRATE: 2 POWDER TOPICAL at 09:26

## 2021-03-17 RX ADMIN — NIFEDIPINE 60 MG: 60 TABLET, EXTENDED RELEASE ORAL at 09:06

## 2021-03-17 RX ADMIN — FERROUS SULFATE TAB 325 MG (65 MG ELEMENTAL FE) 325 MG: 325 (65 FE) TAB at 17:03

## 2021-03-17 RX ADMIN — TAMSULOSIN HYDROCHLORIDE 0.4 MG: 0.4 CAPSULE ORAL at 09:06

## 2021-03-17 RX ADMIN — INSULIN LISPRO 1 UNITS: 100 INJECTION, SOLUTION INTRAVENOUS; SUBCUTANEOUS at 09:08

## 2021-03-17 RX ADMIN — INSULIN LISPRO 2 UNITS: 100 INJECTION, SOLUTION INTRAVENOUS; SUBCUTANEOUS at 17:03

## 2021-03-17 RX ADMIN — AMIODARONE HYDROCHLORIDE 200 MG: 200 TABLET ORAL at 21:05

## 2021-03-17 RX ADMIN — MICONAZOLE NITRATE: 2 POWDER TOPICAL at 17:04

## 2021-03-17 ASSESSMENT — PAIN SCALES - GENERAL
PAINLEVEL_OUTOF10: 0
PAINLEVEL_OUTOF10: 0

## 2021-03-17 NOTE — PROGRESS NOTES
Patient:   Benson Amaro  MR#:    [id number]   Room:    [facility]/352-04   YOB: 1940  Date of Progress Note: 3/17/2021  Time of Note                           7:50 AM  Consulting Physician:   Laila Hoyos M.D. Attending Physician:  Laila Hoyos MD     Chief complaint Respiratory failure     S:This 79 y.o. male  with a past medical history of bladder cancer, HTN, CKD stage III, PVD, hyperlipidemia,CAD,COPD,CHF has oxygen at home but doesn't wear if often,ETOH use drinks 2 glasses of wine every night and former smoker. He presented to [facility] on 3/6/21 with shortness of breath. CXR done showed increased pulmonary vascular congestion and CT of chest showed bilateral pleural effusion. He was admitted to the hospitalist service with Acute on chronic hypoxemic and hypercapnic respiratory, COPD exacerbation and possible recurrent pneumonia. Pulmonology was consulted. He was seen by Dr. Jaiden Bro, who didn't feel he had pneumoinia st this time, but did recommend Trilogy at discharge d/t patient not tolerating Bi-PAP for questionable sleep apnea. Patient had an episode of A-Fib with RVR. Cardiology was consulted. He was seen by Dr. Susy Adorno. He underwent cardiac catheterization by Dr. Smiley George on 3/10/21 revealing 100% occlusion right coronary artery which appears chronic in nature well collateralized from the left no significant disease on the left. Dr. Smiley George recommended placement of a dual-chamber pacemaker. Patient was in agreement and went or surgery on 3/13/21. He tolerated the procedure well. SPT was consulted to evaluate swallow. He was noted to have oropharyngeal phase dysphagia but no s/s of aspiration, he was placed on a regular diet with thin liquids. He is participating in both PT/OT. He is felt to need a stay on Rehab to work towards his goal of returning home with his wife. He is now felt ready to start the Rehab program. No new complaints.      REVIEW OF SYSTEMS:  Constitutional: No fevers No chills  Neck:No stiffness  Respiratory: some shortness of breath  Cardiovascular: No chest pain No palpitations  Gastrointestinal: No abdominal pain    Genitourinary: No Dysuria  Neurological: No headache, no confusion    Past Medical History:      Diagnosis Date    Acute liver failure without hepatic coma 10/23/2018    Back pain     \"with tired legs as a result\"    Bladder cancer ([address] 12/19/2018    Blood circulation, collateral     Carotid arterial disease ([address]     recent surgery    CKD (chronic kidney disease), 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/2018    COPD with acute lower respiratory infection ([address] 2/24/2021    GERD (gastroesophageal reflux disease)     Hyperlipidemia     Hypertension     Hypertension     Palliative care patient 10/23/2018    Pneumonia due to infectious organism 11/06/2018    Primary osteoarthritis of left knee 10/14/2018    PVD (peripheral vascular disease) (HCC)     Tremor     Tremor on Right side x 1-2 weeks per stepdaughter    Type 2 diabetes mellitus with complication, without long-term current use of insulin ([address] 1/21/2021       Past Surgical History:      Procedure Laterality Date    BACK SURGERY      COLONOSCOPY  2007?     CYSTOSCOPY Bilateral 12/19/2018    CYSTOSCOPY, BIOSPY FULGURATION OF BLADDER TUMOR POSSIBLE TURBT, RETROGRADE PYELOGRAM performed by Igor Hernandez MD at [address] COLONOSCOPY FLX DX W/COLLJ SPEC WHEN PFRMD N/A 9/11/2017    Dr Wood Perez internal hemorrhoids, diverticular disease-HP-No recall (age)   [facility] [place] REVISE MEDIAN N/CARPAL TUNNEL SURG Left 7/18/2018    OPEN CARPAL TUNNEL RELEASE performed by David Iglesias MD at [address] Left 8/28/2018    LEFT CAROTID ENDARTERECTOMY WITH VEIN PATCH ANGIOPLASTY AND COMPLETION ANGIOGRAM performed [facility]an [facility] [place] [facility] Left 10/15/2018    LEFT COMPLEX TOTAL KNEE ARTHROPLASTY performed by David Iglesias MD at [facility] TONSILLECTOMY AND ADENOIDECTOMY      VASCULAR SURGERY  04/21/2015    Chandler BESS Ultrasound guided access of left common femoral artery. Aortogram.Diagnostic right lower extremity arteriogram.Radiologic supervision and interpretation.  VASCULAR SURGERY  01/13/2015    Chandler Hamlin M.D Atherectomy,angioplasty,and stenting of left superficial femoral artery.  VASCULAR SURGERY  03/11/2014    Chandler Hamlin M.D. Ultrasound-guided access of right common femoral artery. Aortogram.Left lower extremity arteriogram.Atherectomy and angioplasty of left superficial femoral artery. Radiologic supervision and interpretation.  VASCULAR SURGERY  01/18/2013    Chandler BESS Aortogram.Multistation arteriogram right lower extremity. Laser atherectomy and angioplasty of right superficial femoral artery. Selective catheterization of right tibioperoneal trunk. Angioplasty of peroneal artery and tibioperoneal trunk.  VASCULAR SURGERY  10/30/2018    S. Ultrasound guided cannulation of right internal vein. Placement of right internal jugular vein tunneled dialysis catheter bard equistream xk 23cm tip to cuff    VASCULAR SURGERY  12/17/2018    SJS. Removal of tunneled dilaysis catheter right internal jugular vein.        Medications in Hospital:      Current Facility-Administered Medications:     linaclotide (LINZESS) capsule 145 mcg, 145 mcg, Oral, QAM AC, Grace Cardenas MD    iron sucrose (VENOFER) injection 100 mg, 100 mg, Intravenous, Daily, Grace Cardenas MD    ondansetron (ZOFRAN) injection 4 mg, 4 mg, Intravenous, Q6H PRN, Ed MD Rodri    sodium chloride flush 0.9 % injection 10 mL, 10 mL, Intravenous, PRN, Ed MD Rodri    [DISCONTINUED] acetaminophen (TYLENOL) tablet 650 mg, 650 mg, Oral, Q6H PRN **OR** acetaminophen (TYLENOL) suppository 650 mg, 650 mg, Rectal, Q6H PRN, Ed MD Rodri    dextrose 5 % solution, 100 mL/hr, Intravenous, PRN, Ed MD Estefany Mace dextrose 50 % IV solution, 12.5 g, Intravenous, PRN, Sharie Curling, MD    glucagon (rDNA) injection 1 mg, 1 mg, Intramuscular, PRN, Sharie Curling, MD    glucose (GLUTOSE) 40 % oral gel 15 g, 15 g, Oral, PRN, Sharie Curling, MD    amiodarone (CORDARONE) tablet 200 mg, 200 mg, Oral, BID, Sharie Curling, MD, 200 mg at 03/16/21 2036    aspirin EC tablet 81 mg, 81 mg, Oral, Daily, Sharie Curling, MD, 81 mg at 03/16/21 0856    atorvastatin (LIPITOR) tablet 20 mg, 20 mg, Oral, Daily, Sharie Curling, MD, 20 mg at 03/16/21 0856    bisacodyl (DULCOLAX) EC tablet 5 mg, 5 mg, Oral, Daily PRN, Sharie Curling, MD    calcium carbonate (TUMS) chewable tablet 500 mg, 500 mg, Oral, TID PRN, Sharie Curling, MD    ferrous sulfate (IRON 325) tablet 325 mg, 325 mg, Oral, BID , Sharie Curling, MD, 325 mg at 64/80/96 2436    folic acid (FOLVITE) tablet 1 mg, 1 mg, Oral, Daily, Sharie Curling, MD, 1 mg at 03/16/21 0856    guaiFENesin (ROBITUSSIN) 100 MG/5ML liquid 200 mg, 200 mg, Oral, Q4H PRN, Sharie Curling, MD    insulin lispro (HUMALOG) injection vial 0-6 Units, 0-6 Units, Subcutaneous, TID , Sharie Curling, MD, 2 Units at 03/16/21 1651    insulin lispro (HUMALOG) injection vial 0-3 Units, 0-3 Units, Subcutaneous, Nightly, Sharie Curling, MD, 1 Units at 03/16/21 2037    ipratropium-albuterol (DUONEB) nebulizer solution 1 ampule, 1 ampule, Inhalation, Q4H PRN, Sharie Curling, MD    metoprolol succinate (TOPROL XL) extended release tablet 100 mg, 100 mg, Oral, Daily, Sharie Curling, MD, 100 mg at 03/16/21 0856    miconazole (MICOTIN) 2 % powder, , Topical, 4x Daily, Sharie Curling, MD, Given at 03/16/21 2050    NIFEdipine (PROCARDIA XL) extended release tablet 60 mg, 60 mg, Oral, Daily, Sharie Curling, MD, 60 mg at 03/16/21 0856    pantoprazole (PROTONIX) tablet 40 mg, 40 mg, Oral, Daily, Virgil Main MD, 40 mg at 03/16/21 0856    predniSONE (DELTASONE) tablet 10 mg, 10 mg, Oral, Daily, 10 mg at 03/16/21 0857 **FOLLOWED BY** [START ON 3/18/2021] predniSONE (DELTASONE) tablet 5 mg, 5 mg, Oral, Daily, Virgil Main MD    tamsulosin [facility]) capsule 0.4 mg, 0.4 mg, Oral, BID, Virgil Main MD, 0.4 mg at 03/16/21 2036    [START ON 3/22/2021] vitamin D (ERGOCALCIFEROL) capsule 50,000 Units, 50,000 Units, Oral, Weekly, Virgil Main MD    acetaminophen (TYLENOL) tablet 650 mg, 650 mg, Oral, Q4H PRN, Shai Norton MD    polyethylene glycol (GLYCOLAX) packet 17 g, 17 g, Oral, Daily PRN, Shai Norton MD    Allergies:  Eliquis [apixaban] and Promethazine hcl    Social History:   TOBACCO:   reports that he quit smoking about 17 years ago. He has never used smokeless tobacco.  ETOH:   reports current alcohol use of about 12.0 standard drinks of alcohol per week. Family History:       Problem Relation Age of Onset    Colon Cancer Father     Diabetes Brother     Colon Polyps Neg Hx     Liver Cancer Neg Hx     Liver Disease Neg Hx     Esophageal Cancer Neg Hx     Rectal Cancer Neg Hx     Stomach Cancer Neg Hx          PHYSICAL EXAM:  BP (!) 100/55   Pulse 59   Temp 97 °F (36.1 °C) (Temporal)   Resp 20   Ht 6' (1.829 m)   Wt 250 lb (113.4 kg)   SpO2 93%   BMI 33.91 kg/m²     Constitutional - well developed, well nourished. Eyes - conjunctiva normal.   Ear, nose, throat - No scars, masses, or lesions over external nose or ears, no atrophy of tongue  Neck-symmetric, no masses noted, no jugular vein distension  Respiration- chest wall appears symmetric, good expansion,   normal effort without use of accessory muscles  Musculoskeletal - no significant wasting of muscles noted, no bony deformities  Extremities-no clubbing, cyanosis or edema  Skin - warm, dry, and intact. No rash, erythema, or pallor.   Psychiatric - mood, affect, and behavior appear normal.      Neurological exam  Awake, alert, fluent oriented appropriate affect  Attention and concentration appear appropriate  Recent and remote memory appears unremarkable  Speech normal without dysarthria  No clear issues with language of fund of knowledge     Cranial Nerve Exam     CN III, IV,VI-EOMI, No nystagmus, conjugate eye movements, no ptosis    CN VII-no facial assymetry       Motor Exam  antigravity throughout upper and lower extremities bilaterally      Tremors- no tremors in hands or head noted     Gait  Not tested        Nursing/pcp notes, imaging,labs and vitals reviewed.      PT,OT and/or speech notes reviewed    Lab Results   Component Value Date    WBC 9.2 2021    HGB 7.0 (L) 2021    HCT 23.3 (L) 2021    MCV 95.9 (H) 2021     (L) 2021     Lab Results   Component Value Date     2021    K 5.0 2021     2021    CO2 24 2021    BUN 48 (H) 2021    CREATININE 1.6 (H) 2021    GLUCOSE 176 (H) 2021    CALCIUM 8.3 (L) 2021    PROT 4.9 (L) 2021    LABALBU 2.9 (L) 2021    BILITOT 0.5 2021    ALKPHOS 73 2021    AST 20 2021    ALT 17 2021    LABGLOM 42 (A) 2021    GFRAA 51 (L) 2021     Lab Results   Component Value Date    INR 1.11 03/15/2021    INR 1.13 2021    INR 1.16 2021    PROTIME 14.3 03/15/2021    PROTIME 14.4 2021    PROTIME 14.8 (H) 2021       Carrie Reddy   Student Physical Therapist   Physical Therapy   Progress Notes   Signed   Date of Service:  3/16/2021 12:01 PM               Signed             Show:Clear all  [x]Manual[x]Template[]Copied    Added by:  [x]Kae Karimi    []Dorian for details     Physical Therapy EVALUATION Note  DATE:  3/16/2021  NAME:  Ashley Christina  :  1940  (80 y.o.,male)  MRN:  [id number]     HEIGHT:  Height: 6' (182.9 cm)  WEIGHT:  Weight: 250 lb (113.4 kg)     PATIENT DIAGNOSIS(ES): Diagnosis: PACEMAKER PLACEMENT, CHRONIC HYPOXIA AND HYPERCAPNIA      Additional Pertinent Hx: HTN, HEART FAILURE, CKDIII, PVD, GERD, COPD, ANEMIA, T2DM, A-FIB   RESTRICTIONS/PRECAUTIONS:    Restrictions/Precautions  Restrictions/Precautions: Surgical Protocols, Fall Risk  Implants present? : Pacemaker  Position Activity Restriction  Other position/activity restrictions: PACEMAKER RESTRICTIONS TO LUE   OVERALL  ORIENTATION STATUS:  PAIN:  Pain Level: 0                    NEUROLOGICAL                 STRENGTH  Strength RLE  Strength RLE: WFL  Strength LLE  Strength LLE: WFL  ROM  AROM RLE (degrees)  RLE AROM: WFL  AROM LLE (degrees)  LLE AROM : WFL  POSTURE/BALANCE          ACTIVITY TOLERANCE  Activity Tolerance  Activity Tolerance: Patient Tolerated treatment well, Patient limited by endurance, Patient limited by fatigue      BED MOBILITY  Bed Mobility  Bridging: Stand by assistance  Rolling: Stand by assistance(LEFT SIDE OF BED; TRIPLANAR )  Supine to Sit: Stand by assistance(LEFT SIDE OF BED; TRIPLANAR )  Sit to Supine: Stand by assistance(LEFT SIDE OF BED; TRIPLANAR )  Comment: REQUIRED INCREASED REST BREAKS DUE TO POOR ENDURANCE AND SOB   TRANSFERS  Sit to Stand: Contact guard assistance(RW)      Bed to Chair: Contact guard assistance        WHEELCHAIR PROPULSION 1  Propulsion 1  Propulsion: Manual  Level: Level Tile  Method: CARLITO HERNÁNDEZ  Level of Assistance: Stand by assistance, Contact guard assistance  Description/ Details: 35 FT   Distance: R AND L TURNS. PT FATIGUES MORE EASILY.  SLOW LYNNETTE   WHEELCHAIR PROPULSION 2  AMBULATION 1  AMBULATION 2  STAIRS     GOALS:  Short term goals  Time Frame for Short term goals: 1 WEEK   Short term goal 1: SUPERVISION BED MOBILITY   Short term goal 2: SUPEVISION STS TF W/ RW   Short term goal 3: CGA 50 FT AMBULATION WITH RW   Short term goal 4: CGA CAR TF WITH RW   Short term goal 5: SUPERVISION HEP      Long term goals  Time Frame for Long term goals : 2-3 WEEKS   Long PATIENT GOAL FOR REHAB:  RETURN TO PRIOR LEVEL OF FUNCTION         IRF/RHONDA  Roll Left and Right  Assistance Needed: Supervision or touching assistance  CARE Score: 4  Discharge Goal: Independent     Sit to Lying  Assistance Needed: Supervision or touching assistance  CARE Score: 4  Discharge Goal: Independent     Lying to Sitting on Side of Bed  Assistance Needed: Supervision or touching assistance  CARE Score: 4  Discharge Goal: Independent     Sit to Stand  Assistance Needed: Supervision or touching assistance  CARE Score: 4  Discharge Goal: Independent     Chair/Bed-to-Chair Transfer  Assistance Needed: Supervision or touching assistance  CARE Score: 4  Discharge Goal: Independent     Car Transfer  Reason if not Attempted: Not attempted due to medical condition or safety concerns  CARE Score: 88  Discharge Goal: Independent     Walk 10 Feet  Reason if not Attempted: Not attempted due to medical condition or safety concerns  CARE Score: 88  Discharge Goal: Independent     Walk 50 Feet with Two Turns  Reason if not Attempted: Not attempted due to medical condition or safety concerns  CARE Score: 88  Discharge Goal: Independent     Walk 150 Feet  Reason if not Attempted: Not attempted due to medical condition or safety concerns  CARE Score: 88  Discharge Goal: Independent     Walking 10 Feet on Uneven Surfaces  Reason if not Attempted: Not attempted due to medical condition or safety concerns  CARE Score: 88  Discharge Goal: Supervision or touching assistance     1 Step (Curb)  Reason if not Attempted: Not attempted due to medical condition or safety concerns  CARE Score: 88  Discharge Goal: Independent     4 Steps  Reason if not Attempted: Not attempted due to medical condition or safety concerns  CARE Score: 88  Discharge Goal: Independent     12 Steps  Reason if not Attempted: Not attempted due to medical condition or safety concerns  CARE Score: 88  Discharge Goal: Not Attempted     Wheel 50 Feet with Two Turns  Assistance Needed: Supervision or touching assistance  CARE Score: 4  Discharge Goal: Independent     Wheel 150 Feet  Reason if not Attempted: Not attempted due to medical condition or safety concerns  CARE Score: 88  Discharge Goal: Supervision or touching assistance        LAST TREATMENT TIME  PT Individual Minutes  Time In: 1000  Time Out: 1100  Minutes: 60                                           Cosigned by: Sandie Chambers, PT at 3/16/2021  3:36 PM   Revision History                          RECORD REVIEW: Previous medical records, medications were reviewed at today's visit    IMPRESSION:   1. Respiratory failure. CHF/COPD-O2/Duonebs PRN/tapering prednisone/Trilogy  2. Atrial fibrillation/S/P PPM-Amiodarone/Procardia XL/metorprolol  3. CAD/PVD-ASA/statin  4. Hyperlipidemia-on statin  5. Anemia-on iron  6. HTN-on meds monitor  7. GI-bowel regimen/PPI  8. BPH/history of bladder cancer-on meds  9. Vitamin D deficiency-on Vitamin D  10. DVT prophylaxis-SCDs  11. ETOH use-folic acid  12. Chronic kidney disease-monitor   13. Thrombocytopenia-monitor   14. Back pain/arthritis-Tylenol  15.  PT/OT/Speech     Continue care      Expected duration and frequency therapy: 180 minutes per day, 5 days per week    [facility]  [phone] CELL  Dr Lyla Carroll

## 2021-03-17 NOTE — PROGRESS NOTES
Comprehensive Nutrition Assessment    Type and Reason for Visit:  Initial    Nutrition Recommendations/Plan: Continue current POC. Nutrition Assessment:  Following or new admission to inpatient rehab. Pt out of room at attempted visit. Pt adequately nourished at this time AEB adequate po intake and wt gain per hx. accuchecks running 189-275, A1C from 2/16 6.4%. continue current POC. Malnutrition Assessment:  Malnutrition Status:  No malnutrition    Context:  Acute Illness     Findings of the 6 clinical characteristics of malnutrition:  Energy Intake:  No significant decrease in energy intake  Weight Loss:  No significant weight loss     Body Fat Loss:  Unable to assess     Muscle Mass Loss:  Unable to assess    Fluid Accumulation:  1 - Mild Extremities, Generalized   Strength:  Not Performed    Nutrition Related Findings:  Generalized and BUE non-pitting edema      Wounds:  Moisture Associate Skin Damage       Current Nutrition Therapies:    DIET CARDIAC; Anthropometric Measures:  · Height: 6' (182.9 cm)  · Current Body Weight: 250 lb (113.4 kg)   · Admission Body Weight: 250 lb (113.4 kg)    · Ideal Body Weight: 178 lbs  · BMI: 33.9  · BMI Categories: Obese Class 1 (BMI 30.0-34. 9)       Nutrition Diagnosis:   No nutrition diagnosis at this time    Nutrition Interventions:   Food and/or Nutrient Delivery:  Continue Current Diet  Nutrition Education/Counseling:  No recommendation at this time   Coordination of Nutrition Care:  Continue to monitor while inpatient    Goals:  Pt will consume 50% or greater of meals.        Nutrition Monitoring and Evaluation:   Food/Nutrient Intake Outcomes:  Food and Nutrient Intake  Physical Signs/Symptoms Outcomes:  Biochemical Data, Nutrition Focused Physical Findings, Skin, Weight, Fluid Status or Edema     Electronically signed by Lisa Durham, MS, RD, LD on 3/17/21 at 2:44 PM CDT    Contact: [phone]

## 2021-03-17 NOTE — PROGRESS NOTES
Occupational Therapy  Facility/Department: [facility] 8 REHAB UNIT  Daily Treatment Note  NAME: Kat Maier  : 1940  MRN: [id number]    Date of Service: 3/17/2021    Discharge Recommendations:  Home with Home health OT       Assessment   Performance deficits / Impairments: Decreased functional mobility ; Decreased endurance;Decreased strength;Decreased ROM; Decreased balance;Decreased ADL status; Decreased high-level IADLs  Activity Tolerance  Activity Tolerance: Patient Tolerated treatment well  Safety Devices  Safety Devices in place: Yes  Type of devices: Left in chair(left with PT)         Patient Diagnosis(es): There were no encounter diagnoses. has a past medical history of Acute liver failure without hepatic coma, Back pain, Bladder cancer (HCC), Blood circulation, collateral, Carotid arterial disease ([address] CKD (chronic kidney disease), stage II, COPD with acute lower respiratory infection ([address] GERD (gastroesophageal reflux disease), Hyperlipidemia, Hypertension, Hypertension, Palliative care patient, Pneumonia due to infectious organism, Primary osteoarthritis of left knee, PVD (peripheral vascular disease) ([address] Tremor, and Type 2 diabetes mellitus with complication, without long-term current use of insulin ([address] has a past surgical history that includes back surgery; Tonsillectomy and adenoidectomy; Colonoscopy (?); pr colonoscopy flx dx w/collj spec when pfrmd (N/A, 2017); pr revise median n/carpal tunnel surg (Left, 2018); pr thromboendartectmy neck,neck incis (Left, 2018); vascular surgery (2015); vascular surgery (2015); vascular surgery (2014); vascular surgery (2013); pr total knee arthroplasty (Left, 10/15/2018); vascular surgery (10/30/2018); vascular surgery (2018); and Cystoscopy (Bilateral, 2018).     Restrictions  Restrictions/Precautions  Restrictions/Precautions: Surgical Protocols, Fall Risk  Implants present? : Pacemaker  Position Activity Restriction  Other position/activity restrictions: PACEMAKER RESTRICTIONS TO LUE   Subjective   General  Chart Reviewed: Yes, Orders  Patient assessed for rehabilitation services?: Yes  Additional Pertinent Hx: Chronic low back pain, ETOH use, bladder cancer, home O2 prn  Family / Caregiver Present: No  Diagnosis: Respiratory failure with hypoxia, recen pacemaker placement--3/13  Pain Assessment  Pain Assessment: 0-10  Pain Level: 0  Pre Treatment Pain Screening  Pain at present: 0  Scale Used: Numeric Score  Vital Signs  Patient Currently in Pain: No   Objective    Attendance  Activity: Discussion/reminisce  Participation: Active participation  Therapeutic Recreation  Social Skills: Interacts independently in social activity  Type of ROM/Therapeutic Exercise  Type of ROM/Therapeutic Exercise: Free weights(2# R UE 15reps, LUE modified proximally due to recent pacemaker with 2# distally)     Plan   Plan  Specific instructions for Next Treatment: AE, endurance training  Current Treatment Recommendations: Strengthening, Patient/Caregiver Education & Training, Home Management Training, Equipment Evaluation, Education, & procurement, Balance Training, Self-Care / ADL, Safety Education & Training, Endurance Training, Functional Mobility Training    Goals  Short term goals  Time Frame for Short term goals: 1 week  Short term goal 1: pt will complete LB dressing with AE prn with CGA  Short term goal 2: pt will complete overall toileting with CGA  Short term goal 3: pt will complete simple ambulatory home making task with CGA while maintaining pacemaker precautions  Short term goal 4: pt will complete overall bathing with CGA  Short term goal 5: pt will complete 1-2 handed static standing act for 2 mins with supervision  Short term goal 6: pt will complete HEP x 5 occasions within range of pacemaker precautions to improve strength and endurance for ADLs  Long term goals  Time Frame for Long term goals : 2 weeks  Long term goal 1: pt will complete overall dressing with modified independence  Long term goal 2: pt will complete overall toileting with modified independence  Long term goal 3: pt will complete overall bathing with modified independence  Long term goal 4: pt will complete simple ambulatory home making task with modified independence  Long term goal 5: pt will complete HEP with independence  Long term goals 6: pt verbalize DME for home       Therapy Time   Individual Concurrent Group Co-treatment   Time In 1345         Time Out 1430         Minutes 45         Timed Code Treatment Minutes: 45 Minutes       Electronically signed by Marcy Horne OT on 3/17/2021 at 3:19 PM

## 2021-03-17 NOTE — PROGRESS NOTES
Kat Darrion  [id number]     03/17/21 1329 03/17/21 1331   Subjective   Subjective Pt agreed to therapy this afternoon. --    Pain Screening   Patient Currently in Pain No  --    Transfers   Sit to Stand Contact guard assistance  --    Stand to sit Contact guard assistance  --    Ambulation   Ambulation? Yes  --    Ambulation 1   Surface level tile  --    Device Rolling Walker  --    Other Apparatus O2  (2L)  --    Assistance Contact guard assistance  --    Quality of Gait Pt showed forward flex posture and needed VC's to stay inside RW. Pt also showed decreased step length [place].  --    Distance 20', 30'  --    Stairs/Curb   Stairs? No  --    Wheelchair Activities   Wheelchair Parts Management No  --    Propulsion Yes  --    Propulsion 1   Propulsion Manual  --    Level Level Tile  --    Method RUE;LUE  --    Level of Assistance Stand by assistance  --    Description/ Details 48'  --    Distance Pt was able to increase distance this afternoon. Pt kept veering to R side needing cues to correct. --    Conditions Requiring Skilled Therapeutic Intervention   Body structures, Functions, Activity limitations  --  Decreased functional mobility ; Decreased ADL status; Decreased endurance;Decreased posture   Assessment  --  Pt continues to fatigue quickly and become SOB during amb. Pt was able to increase W/C propulsion distance this afternoon.    Prognosis  --  Good   Activity Tolerance   Activity Tolerance  --  Patient limited by fatigue;Patient limited by endurance   Electronically signed by Sherry Mcrae PTA on 3/17/2021 at 1:44 PM

## 2021-03-17 NOTE — PROGRESS NOTES
Benson Amaro  [id number]     03/17/21 1329 03/17/21 1331   Subjective   Subjective Pt agreed to therapy this afternoon. --    Pain Screening   Patient Currently in Pain No  --    Transfers   Sit to Stand Contact guard assistance  --    Stand to sit Contact guard assistance  --    Ambulation   Ambulation? Yes  --    Ambulation 1   Surface level tile  --    Device Rolling Walker  --    Other Apparatus O2  (2L)  --    Assistance Contact guard assistance  --    Quality of Gait Pt showed forward flex posture and needed VC's to stay inside RW. Pt also showed decreased step length [place].  --    Distance 20', 30'  --    Stairs/Curb   Stairs? No  --    Wheelchair Activities   Wheelchair Parts Management No  --    Propulsion Yes  --    Propulsion 1   Propulsion Manual  --    Level Level Tile  --    Method RUE;LUE  --    Level of Assistance Stand by assistance  --    Distance Pt fatigued quickly during propulsion. --    Conditions Requiring Skilled Therapeutic Intervention   Body structures, Functions, Activity limitations  --  Decreased functional mobility ; Decreased ADL status; Decreased endurance;Decreased posture   Assessment  --  Pt continues to fatigue quickly and become SOB during amb. Pt also fatigued quickly during W/C propulsion.    Prognosis  --  Good   Activity Tolerance   Activity Tolerance  --  Patient limited by fatigue;Patient limited by endurance   Electronically signed by Medina Guo PTA on 3/17/2021 at 1:33 PM

## 2021-03-17 NOTE — PROGRESS NOTES
Occupational Therapy  Facility/Department: [facility] 8 REHAB UNIT  Daily Treatment Note  NAME: Sandy Manzo  : 1940  MRN: [id number]    Date of Service: 3/17/2021    Discharge Recommendations:  Home with Home health OT       Assessment   Performance deficits / Impairments: Decreased functional mobility ; Decreased endurance;Decreased strength;Decreased ROM; Decreased balance;Decreased ADL status; Decreased high-level IADLs  OT Education: ADL Adaptive Strategies  Activity Tolerance  Activity Tolerance: Patient limited by fatigue  Safety Devices  Type of devices: Left in chair;Call light within reach; Chair alarm in place         Patient Diagnosis(es): There were no encounter diagnoses. has a past medical history of Acute liver failure without hepatic coma, Back pain, Bladder cancer (HCC), Blood circulation, collateral, Carotid arterial disease ([address] CKD (chronic kidney disease), stage II, COPD with acute lower respiratory infection ([address] GERD (gastroesophageal reflux disease), Hyperlipidemia, Hypertension, Hypertension, Palliative care patient, Pneumonia due to infectious organism, Primary osteoarthritis of left knee, PVD (peripheral vascular disease) ([address] Tremor, and Type 2 diabetes mellitus with complication, without long-term current use of insulin ([address] has a past surgical history that includes back surgery; Tonsillectomy and adenoidectomy; Colonoscopy (?); pr colonoscopy flx dx w/collj spec when pfrmd (N/A, 2017); pr revise median n/carpal tunnel surg (Left, 2018); pr thromboendartectmy neck,neck incis (Left, 2018); vascular surgery (2015); vascular surgery (2015); vascular surgery (2014); vascular surgery (2013); pr total knee arthroplasty (Left, 10/15/2018); vascular surgery (10/30/2018); vascular surgery (2018); and Cystoscopy (Bilateral, 2018).     Restrictions  Restrictions/Precautions  Restrictions/Precautions: Surgical Protocols, Fall Risk  Implants present? : Pacemaker  Position Activity Restriction  Other position/activity restrictions: PACEMAKER RESTRICTIONS TO LUE   Subjective   General  Chart Reviewed: Yes, Orders  Patient assessed for rehabilitation services?: Yes  Additional Pertinent Hx: Chronic low back pain, ETOH use, bladder cancer, home O2 prn  Diagnosis: Respiratory failure with hypoxia, recen pacemaker placement--3/13  Pain Assessment  Pain Assessment: 0-10  Pain Level: 0  Pre Treatment Pain Screening  Pain at present: 0  Scale Used: Numeric Score  Vital Signs  Patient Currently in Pain: No   Objective        03/17/21 0825   Oral Hygiene   Assistance Needed Setup or clean-up assistance   CARE Score 5   [address] [place] assistance   Comment assistance with pants up/down due to increased fatigue this session   CARE Score 2   [address] assistance   Comment increased time, vc for tech   CARE Score 3      03/17/21 0825   Toilet Transfer   Assistance Needed Partial/moderate assistance   Comment mod A for sit to stand from toilet due to fatigue low surface, once up min A with RW   CARE Score 3     Plan   Plan  Specific instructions for Next Treatment: AE, endurance training  Current Treatment Recommendations: Strengthening, Patient/Caregiver Education & Training, Home Management Training, Equipment Evaluation, Education, & procurement, Balance Training, Self-Care / ADL, Safety Education & Training, Endurance Training, Functional Mobility Training    Goals  Short term goals  Time Frame for Short term goals: 1 week  Short term goal 1: pt will complete LB dressing with AE prn with CGA  Short term goal 2: pt will complete overall toileting with CGA  Short term goal 3: pt will complete simple ambulatory home making task with CGA while maintaining pacemaker precautions  Short term goal 4: pt will complete overall bathing with CGA  Short term goal 5: pt will

## 2021-03-17 NOTE — PROGRESS NOTES
Yoana Sharpe  [id number]     03/17/21 1329 03/17/21 1331 03/17/21 1445   Subjective   Subjective  --   --  Pt agreed to therapy this afternoon. Pain Screening   Patient Currently in Pain No  --   --    Transfers   Sit to Stand  --   --   --    Stand to sit  --   --   --    Ambulation   Ambulation?  --   --   --    Ambulation 1   Surface  --   --   --    Device  --   --   --    Other Apparatus  --   --   --    Assistance  --   --   --    Quality of Gait  --   --   --    Distance  --   --   --    Stairs/Curb   Stairs? No  --   --    Wheelchair Activities   Wheelchair Parts Management No  --   --    Propulsion Yes  --   --    Propulsion 1   Propulsion Manual  --   --    Level Level Tile  --   --    Method RUE;LUE  --   --    Level of Assistance Stand by assistance  --   --    Description/ Details 48'  --   --    Distance Pt was able to increase distance this afternoon. Pt kept veering to R side needing cues to correct. --   --    Exercises   Comments  --   --   --    Conditions Requiring Skilled Therapeutic Intervention   Body structures, Functions, Activity limitations  --  Decreased functional mobility ; Decreased ADL status; Decreased endurance;Decreased posture  --    Assessment  --   --   --    Prognosis  --   --   --    Activity Tolerance   Activity Tolerance  --   --   --       03/17/21 1446 03/17/21 1447   Subjective   Subjective  --   --    Pain Screening   Patient Currently in Pain  --   --    Transfers   Sit to Stand Contact guard assistance  --    Stand to sit Contact guard assistance  --    Ambulation   Ambulation? Yes  --    Ambulation 1   Surface level tile  --    Device Rolling Walker  --    Other Apparatus O2  (2L)  --    Assistance Contact guard assistance  --    Quality of Gait Pt showed forward flex posture and needed VC's to stay inside RW.   Pt also showed decreased step length [place].  --    Distance 25'  --    Stairs/Curb   Stairs?  --   --    Wheelchair Activities   Wheelchair Parts Management --   --    Propulsion  --   --    Propulsion 1   Propulsion  --   --    Level  --   --    Method  --   --    Level of Assistance  --   --    Description/ Details  --   --    Distance  --   --    Exercises   Comments Sitting [place] LE ther ex x 20 reps. --    Conditions Requiring Skilled Therapeutic Intervention   Body structures, Functions, Activity limitations  --   --    Assessment  --  Pt continues to fatigue quickly and become SOB during amb. Pt tolerated sitting ther ex well.    Prognosis  --  Good   Activity Tolerance   Activity Tolerance Patient limited by fatigue;Patient limited by endurance  --    Electronically signed by Lin Oquendo PTA on 3/17/2021 at 2:48 PM

## 2021-03-18 LAB
GLUCOSE BLD-MCNC: 124 MG/DL (ref 70–99)
GLUCOSE BLD-MCNC: 213 MG/DL (ref 70–99)
GLUCOSE BLD-MCNC: 219 MG/DL (ref 70–99)
GLUCOSE BLD-MCNC: 222 MG/DL (ref 70–99)
PERFORMED ON: ABNORMAL

## 2021-03-18 PROCEDURE — 1180000000 HC REHAB R&B

## 2021-03-18 PROCEDURE — 99232 SBSQ HOSP IP/OBS MODERATE 35: CPT | Performed by: PSYCHIATRY & NEUROLOGY

## 2021-03-18 PROCEDURE — 82947 ASSAY GLUCOSE BLOOD QUANT: CPT

## 2021-03-18 PROCEDURE — 6360000002 HC RX W HCPCS: Performed by: PSYCHIATRY & NEUROLOGY

## 2021-03-18 PROCEDURE — 97530 THERAPEUTIC ACTIVITIES: CPT

## 2021-03-18 PROCEDURE — 97116 GAIT TRAINING THERAPY: CPT

## 2021-03-18 PROCEDURE — 6370000000 HC RX 637 (ALT 250 FOR IP): Performed by: HOSPITALIST

## 2021-03-18 PROCEDURE — 97110 THERAPEUTIC EXERCISES: CPT

## 2021-03-18 PROCEDURE — 2580000003 HC RX 258: Performed by: PSYCHIATRY & NEUROLOGY

## 2021-03-18 PROCEDURE — 2700000000 HC OXYGEN THERAPY PER DAY

## 2021-03-18 RX ADMIN — SODIUM CHLORIDE, PRESERVATIVE FREE 10 ML: 5 INJECTION INTRAVENOUS at 08:15

## 2021-03-18 RX ADMIN — IRON SUCROSE 100 MG: 20 INJECTION, SOLUTION INTRAVENOUS at 08:09

## 2021-03-18 RX ADMIN — INSULIN LISPRO 2 UNITS: 100 INJECTION, SOLUTION INTRAVENOUS; SUBCUTANEOUS at 11:50

## 2021-03-18 RX ADMIN — ASPIRIN 81 MG: 81 TABLET, FILM COATED ORAL at 08:12

## 2021-03-18 RX ADMIN — MICONAZOLE NITRATE: 2 POWDER TOPICAL at 11:52

## 2021-03-18 RX ADMIN — SODIUM CHLORIDE, PRESERVATIVE FREE 10 ML: 5 INJECTION INTRAVENOUS at 20:12

## 2021-03-18 RX ADMIN — FERROUS SULFATE TAB 325 MG (65 MG ELEMENTAL FE) 325 MG: 325 (65 FE) TAB at 08:12

## 2021-03-18 RX ADMIN — PANTOPRAZOLE SODIUM 40 MG: 40 TABLET, DELAYED RELEASE ORAL at 08:13

## 2021-03-18 RX ADMIN — AMIODARONE HYDROCHLORIDE 200 MG: 200 TABLET ORAL at 20:11

## 2021-03-18 RX ADMIN — TAMSULOSIN HYDROCHLORIDE 0.4 MG: 0.4 CAPSULE ORAL at 08:13

## 2021-03-18 RX ADMIN — INSULIN LISPRO 2 UNITS: 100 INJECTION, SOLUTION INTRAVENOUS; SUBCUTANEOUS at 17:13

## 2021-03-18 RX ADMIN — PREDNISONE 5 MG: 5 TABLET ORAL at 08:12

## 2021-03-18 RX ADMIN — NIFEDIPINE 60 MG: 60 TABLET, EXTENDED RELEASE ORAL at 08:06

## 2021-03-18 RX ADMIN — AMIODARONE HYDROCHLORIDE 200 MG: 200 TABLET ORAL at 08:12

## 2021-03-18 RX ADMIN — MICONAZOLE NITRATE: 2 POWDER TOPICAL at 20:16

## 2021-03-18 RX ADMIN — MICONAZOLE NITRATE: 2 POWDER TOPICAL at 08:16

## 2021-03-18 RX ADMIN — ATORVASTATIN CALCIUM 20 MG: 20 TABLET, FILM COATED ORAL at 08:12

## 2021-03-18 RX ADMIN — FERROUS SULFATE TAB 325 MG (65 MG ELEMENTAL FE) 325 MG: 325 (65 FE) TAB at 17:13

## 2021-03-18 RX ADMIN — TAMSULOSIN HYDROCHLORIDE 0.4 MG: 0.4 CAPSULE ORAL at 20:11

## 2021-03-18 RX ADMIN — FOLIC ACID 1 MG: 1 TABLET ORAL at 08:12

## 2021-03-18 ASSESSMENT — PAIN SCALES - GENERAL: PAINLEVEL_OUTOF10: 0

## 2021-03-18 NOTE — PLAN OF CARE
Problem: Falls - Risk of:  Goal: Will remain free from falls  Description: Will remain free from falls  3/18/2021 1121 by Keily Vazquez RN  Outcome: Ongoing  3/18/2021 0011 by Janene Ibrahim RN  Outcome: Ongoing   Up with 1-2 assist, depending on fatigue level

## 2021-03-18 NOTE — PROGRESS NOTES
Occupational Therapy  Facility/Department: [facility] 8 REHAB UNIT  Daily Treatment Note  NAME: Yury Patrick  : 1940  MRN: [id number]    Date of Service: 3/18/2021    Discharge Recommendations:  Home with Home health OT       Assessment   Performance deficits / Impairments: Decreased functional mobility ; Decreased endurance;Decreased strength;Decreased ROM; Decreased balance;Decreased ADL status; Decreased high-level IADLs  Activity Tolerance  Activity Tolerance: Patient Tolerated treatment well  Safety Devices  Safety Devices in place: Yes  Type of devices: Left in bed;Call light within reach; Bed alarm in place         Patient Diagnosis(es): There were no encounter diagnoses. has a past medical history of Acute liver failure without hepatic coma, Back pain, Bladder cancer (HCC), Blood circulation, collateral, Carotid arterial disease ([address] CKD (chronic kidney disease), stage II, COPD with acute lower respiratory infection ([address] GERD (gastroesophageal reflux disease), Hyperlipidemia, Hypertension, Hypertension, Palliative care patient, Pneumonia due to infectious organism, Primary osteoarthritis of left knee, PVD (peripheral vascular disease) ([address] Tremor, and Type 2 diabetes mellitus with complication, without long-term current use of insulin ([address] has a past surgical history that includes back surgery; Tonsillectomy and adenoidectomy; Colonoscopy (?); pr colonoscopy flx dx w/collj spec when pfrmd (N/A, 2017); pr revise median n/carpal tunnel surg (Left, 2018); pr thromboendartectmy neck,neck incis (Left, 2018); vascular surgery (2015); vascular surgery (2015); vascular surgery (2014); vascular surgery (2013); pr total knee arthroplasty (Left, 10/15/2018); vascular surgery (10/30/2018); vascular surgery (2018); and Cystoscopy (Bilateral, 2018).     Restrictions  Restrictions/Precautions  Restrictions/Precautions: Surgical Protocols, Fall Risk  Implants present? : Pacemaker  Position Activity Restriction  Other position/activity restrictions: PACEMAKER RESTRICTIONS TO LUE   Subjective   General  Chart Reviewed: Yes, Orders  Patient assessed for rehabilitation services?: Yes  Additional Pertinent Hx: Chronic low back pain, ETOH use, bladder cancer, home O2 prn  Family / Caregiver Present: No  Diagnosis: Respiratory failure with hypoxia, recen pacemaker placement--3/13  Pain Assessment  Pain Assessment: 0-10  Pain Level: 0  Pre Treatment Pain Screening  Pain at present: 0  Scale Used: Numeric Score  Vital Signs  Patient Currently in Pain: No   Objective    Balance  Sitting Balance: Supervision  Standing Balance: Contact guard assistance  Standing Balance  Time: 2 mins 1st occassion, 1 min and 30 secs on 2nd occasion  Activity: 1 handed static act     Transfers  Stand Step Transfers: Minimal assistance  Sit to stand: Minimal assistance  Stand to sit: Contact guard assistance     Type of ROM/Therapeutic Exercise  Type of ROM/Therapeutic Exercise: Free weights(2# RUE, LUE modified)           Plan   Plan  Specific instructions for Next Treatment: AE, endurance training  Current Treatment Recommendations: Strengthening, Patient/Caregiver Education & Training, Home Management Training, Equipment Evaluation, Education, & procurement, Balance Training, Self-Care / ADL, Safety Education & Training, Endurance Training, Functional Mobility Training    Goals  Short term goals  Time Frame for Short term goals: 1 week  Short term goal 1: pt will complete LB dressing with AE prn with CGA  Short term goal 2: pt will complete overall toileting with CGA  Short term goal 3: pt will complete simple ambulatory home making task with CGA while maintaining pacemaker precautions  Short term goal 4: pt will complete overall bathing with CGA  Short term goal 5: pt will complete 1-2 handed static standing act for 2 mins with supervision  Short term goal 6: pt will complete HEP x 5 occasions within range of pacemaker precautions to improve strength and endurance for ADLs  Long term goals  Time Frame for Long term goals : 2 weeks  Long term goal 1: pt will complete overall dressing with modified independence  Long term goal 2: pt will complete overall toileting with modified independence  Long term goal 3: pt will complete overall bathing with modified independence  Long term goal 4: pt will complete simple ambulatory home making task with modified independence  Long term goal 5: pt will complete HEP with independence  Long term goals 6: pt verbalize DME for home       Therapy Time   Individual Concurrent Group Co-treatment   Time In 1345         Time Out 1430         Minutes 45         Timed Code Treatment Minutes: 45 Minutes       Electronically signed by Rick Galvez OT on 3/18/2021 at 5:37 PM

## 2021-03-18 NOTE — PLAN OF CARE
Problem: Falls - Risk of:  Goal: Will remain free from falls  Description: Will remain free from falls  3/18/2021 0011 by Bailee Dominique RN  Outcome: Ongoing  3/17/2021 1131 by Claudia Lomeli RN  Outcome: Ongoing  Goal: Absence of physical injury  Description: Absence of physical injury  3/18/2021 0011 by Bailee Dominique RN  Outcome: Ongoing  3/17/2021 1131 by Claudia Lomeli RN  Outcome: Ongoing     Problem: IP BLADDER/VOIDING  Goal: LTG - patient will complete bladder elimination  3/18/2021 0011 by Bailee Dominique RN  Outcome: Ongoing  3/17/2021 1131 by Claudia Lomeli RN  Outcome: Ongoing  Goal: LTG - Patient will utilize adaptive techniques/equipment to complete bladder elimination  3/18/2021 0011 by Bailee Dominique RN  Outcome: Ongoing  3/17/2021 1131 by Claudia Lomeli RN  Outcome: Ongoing  Goal: LTG - patient will achieve acceptable level of continence  3/18/2021 0011 by Bailee Dominique RN  Outcome: Ongoing  3/17/2021 1131 by Claudia Lomeli RN  Outcome: Ongoing  Goal: STG - Patient demonstrates ability to take care of indwelling catheter  3/18/2021 0011 by Bailee Dominique RN  Outcome: Ongoing  3/17/2021 1131 by Claudia Lomeli RN  Outcome: Ongoing  Goal: STG - patient demonstrates self-cath technique using clean technique and care of the catheter  3/18/2021 0011 by Bailee Dominique RN  Outcome: Ongoing  3/17/2021 1131 by Claudia Lomeli RN  Outcome: Ongoing  Goal: STG - Patient demonstrates no accidents  3/18/2021 0011 by Bailee Dominique RN  Outcome: Ongoing  3/17/2021 1131 by Claudia Lomeli RN  Outcome: Ongoing  Goal: STG - Patient will state signs and symptoms of UTI  3/18/2021 0011 by Bailee Dominique RN  Outcome: Ongoing  3/17/2021 1131 by Claudia Lomeli RN  Outcome: Ongoing  Goal: STG - patient will be able to empty bladder  3/18/2021 0011 by Bailee Dominique RN  Outcome: Ongoing  3/17/2021 1131 by Claudia Lomeli RN  Outcome: Ongoing  Goal: STG - Patient demonstrates understanding of self catheterization schedule by completing task on time  3/18/2021 0011 by Richard Blum RN  Outcome: Ongoing  3/17/2021 1131 by Gilberto Ferro RN  Outcome: Ongoing  Goal: STG - patient participates in bladder program by expressing need to void  3/18/2021 0011 by Richard Blum RN  Outcome: Ongoing  3/17/2021 1131 by Gilberto Ferro RN  Outcome: Ongoing  Goal: STG - Patient verbalizes understanding of catheter care indwelling/intermittent  3/18/2021 0011 by Richard Blum RN  Outcome: Ongoing  3/17/2021 1131 by Gilberto Ferro RN  Outcome: Ongoing  Goal: STG - patient participates in bladder program by adhering to implemented toileting schedule  3/18/2021 0011 by Richard Blum RN  Outcome: Ongoing  3/17/2021 1131 by Gilberto Ferro RN  Outcome: Ongoing     Problem: IP BOWEL ELIMINATION  Goal: LTG - patient will utilize adaptive techniques/equipment to complete bowel elimination  3/18/2021 0011 by Richard Blum RN  Outcome: Ongoing  3/17/2021 1131 by Gilberto Ferro RN  Outcome: Ongoing  Goal: LTG - patient will have regular and routine bowel evacuation  3/18/2021 0011 by Richard Blum RN  Outcome: Ongoing  3/17/2021 1131 by Gilberto Ferro RN  Outcome: Ongoing  Goal: STG - patient will be accident free  3/18/2021 0011 by Richard Blum RN  Outcome: Ongoing  3/17/2021 1131 by Gilberto Ferro RN  Outcome: Ongoing  Goal: STG - Patient demonstrates care and management of ostomy bag  3/18/2021 0011 by Richard Blum RN  Outcome: Ongoing  3/17/2021 1131 by Gilberto Ferro RN  Outcome: Ongoing  Goal: STG - Patient participates in bowel management program  3/18/2021 0011 by Richard Blum RN  Outcome: Ongoing  3/17/2021 1131 by Gilberto Ferro RN  Outcome: Ongoing  Goal: STG - patient maintains skin integrity  3/18/2021 0011 by Richard Blum RN  Outcome: Ongoing  3/17/2021 1131 by Vivica Ferro, RN  Outcome: Ongoing  Goal: STG - Patient will verbalize signs and symptoms of constipation and how to prevent/alleviate  3/18/2021 0011 by Rudy Smith RN  Outcome: Ongoing  3/17/2021 1131 by Nam Fountain RN  Outcome: Ongoing  Goal: STG - patient will be continent of stool  3/18/2021 0011 by Rudy Smith RN  Outcome: Ongoing  3/17/2021 1131 by Nam Fountain RN  Outcome: Ongoing  Goal: STG - Patient completes digital stimulation technique  3/18/2021 0011 by Rudy Smith RN  Outcome: Ongoing  3/17/2021 1131 by Nam Fountain RN  Outcome: Ongoing  Goal: STG - Patient verbalizes knowledge about relationship between diet, fluid intake, activity and medication on constipation  3/18/2021 0011 by Rudy Smith RN  Outcome: Ongoing  3/17/2021 1131 by Nam Fountain RN  Outcome: Ongoing     Problem: IP BREATHING  Goal: LTG - patient will mobilize secretions and maintain airway  3/18/2021 0011 by Rudy Smith RN  Outcome: Ongoing  3/17/2021 1131 by Nam Fountain RN  Outcome: Ongoing  Goal: LTG - Patient/caregiver will demonstrate/perform proper techniques to maintain patent airway  3/18/2021 0011 by Rudy Smith RN  Outcome: Ongoing  3/17/2021 1131 by Nam Fountain RN  Outcome: Ongoing  Goal: LTG - patient/caregiver will demonstrate/perform improved or stable self care abilities within physical limitations  3/18/2021 0011 by Rudy Smith RN  Outcome: Ongoing  3/17/2021 1131 by Nam Fountain RN  Outcome: Ongoing  Goal: STG - Patient/caregiver will maintain patent airway  3/18/2021 0011 by Rudy Smith RN  Outcome: Ongoing  3/17/2021 1131 by Nam Fountain RN  Outcome: Ongoing  Goal: STG - respiratory rate and effort will be within normal limits for the patient  3/18/2021 0011 by Rudy Smith RN  Outcome: Ongoing  3/17/2021 1131 by Nam Fountain RN  Outcome: Ongoing  Goal: STG - patient/caregiver will be able to verbalize oxygen safety precautions  3/18/2021 0011 by Rudy Smith RN  Outcome: Ongoing  3/17/2021 1131 by Nam Fountain RN  Outcome: Ongoing  Goal: STG - Patient/caregiver demonstrates correct suctioning technique  3/18/2021 0011 by Abhijit Pereira RN  Outcome: Ongoing  3/17/2021 1131 by Jessie Dinero RN  Outcome: Ongoing  Goal: STG - patient will utilize incentive spirometer  3/18/2021 0011 by Abhijit Pereira, RN  Outcome: Ongoing  3/17/2021 1131 by Jessie Dinero RN  Outcome: Ongoing  Goal: STG - Patient performs or directs assisted coughing  3/18/2021 0011 by Abhijit Pereira, RN  Outcome: Ongoing  3/17/2021 1131 by Jessie Dinero RN  Outcome: Ongoing  Goal: STG - patient can administer MDI's  3/18/2021 0011 by Abhijit Pereira, RN  Outcome: Ongoing  3/17/2021 1131 by Jessie Dinero RN  Outcome: Ongoing  Goal: STG - Patient can utilize incentive spirometer  3/18/2021 0011 by Abhijit Pereira, RN  Outcome: Ongoing  3/17/2021 1131 by Jessie Dinero RN  Outcome: Ongoing  Goal: STG - family can complete suctioning  3/18/2021 0011 by Abhijit Pereira, RN  Outcome: Ongoing  3/17/2021 1131 by Jessie Dinero RN  Outcome: Ongoing     Problem: SAFETY  Goal: LTG - patient will adhere to hip precautions during ADL's and transfers  3/18/2021 0011 by Abhijit Pereira, RN  Outcome: Ongoing  3/17/2021 1131 by Jessie Dinero RN  Outcome: Ongoing  Goal: LTG - Patient will demonstrate safety requirements appropriate to situation/environment  3/18/2021 0011 by Abhijit Pereira, RN  Outcome: Ongoing  3/17/2021 1131 by Jessie Dinero RN  Outcome: Ongoing  Goal: LTG - patient will utilize safety techniques  3/18/2021 0011 by Abhijit Pereira, RN  Outcome: Ongoing  3/17/2021 1131 by Jessie Dinero RN  Outcome: Ongoing  Goal: STG - patient locks brakes on wheelchair  3/18/2021 0011 by Abhijit Pereira RN  Outcome: Ongoing  3/17/2021 1131 by Jessie Dinero RN  Outcome: Ongoing  Goal: STG - Patient uses call light consistently to request assistance with transfers  3/18/2021 0011 by Abhijit Pereira RN  Outcome: Ongoing  3/17/2021 1131 by Jessie Dinero RN  Outcome: Ongoing  Goal: STG - patient uses gait belt during all transfers  3/18/2021 0011 by Rosa Colón Tena Sharif RN  Outcome: Ongoing  3/17/2021 1131 by Maddison Killian RN  Outcome: Ongoing

## 2021-03-18 NOTE — PROGRESS NOTES
Occupational Therapy  Facility/Department: [facility] 8 REHAB UNIT  Daily Treatment Note  NAME: Benson Amaro  : 1940  MRN: [id number]    Date of Service: 3/18/2021    Discharge Recommendations:  Home with Home health OT       Assessment   Performance deficits / Impairments: Decreased functional mobility ; Decreased endurance;Decreased strength;Decreased ROM; Decreased balance;Decreased ADL status; Decreased high-level IADLs  Treatment Diagnosis: Pneumonia, recent pacemaker (3-13-21)  Activity Tolerance  Activity Tolerance: Patient Tolerated treatment well  Safety Devices  Safety Devices in place: Yes  Type of devices: Left in chair;Call light within reach; Chair alarm in place         Patient Diagnosis(es): There were no encounter diagnoses. has a past medical history of Acute liver failure without hepatic coma, Back pain, Bladder cancer (HCC), Blood circulation, collateral, Carotid arterial disease ([address] CKD (chronic kidney disease), stage II, COPD with acute lower respiratory infection ([address] GERD (gastroesophageal reflux disease), Hyperlipidemia, Hypertension, Hypertension, Palliative care patient, Pneumonia due to infectious organism, Primary osteoarthritis of left knee, PVD (peripheral vascular disease) ([address] Tremor, and Type 2 diabetes mellitus with complication, without long-term current use of insulin ([address] has a past surgical history that includes back surgery;  Tonsillectomy and adenoidectomy; Colonoscopy (?); pr colonoscopy flx dx w/collj spec when pfrmd (N/A, 2017); pr revise median n/carpal tunnel surg (Left, 2018); pr thromboendartectmy neck,neck incis (Left, 2018); vascular surgery (2015); vascular surgery (2015); vascular surgery (2014); vascular surgery (2013); pr total knee arthroplasty (Left, 10/15/2018); vascular surgery (10/30/2018); vascular surgery (2018); and Cystoscopy (Bilateral, 12/19/2018). Restrictions  Restrictions/Precautions  Restrictions/Precautions: Surgical Protocols, Fall Risk  Implants present? : Pacemaker  Position Activity Restriction  Other position/activity restrictions: PACEMAKER RESTRICTIONS TO LUE   Subjective   General  Chart Reviewed: Yes, Orders  Patient assessed for rehabilitation services?: Yes  Additional Pertinent Hx: Chronic low back pain, ETOH use, bladder cancer, home O2 prn  Family / Caregiver Present: No  Diagnosis: Respiratory failure with hypoxia, recen pacemaker placement--3/13  Vital Signs  Pulse: 92  Oxygen Therapy  SpO2: 92 %(Post ambulation)  Pulse Oximeter Device Mode: Intermittent  Pulse Oximeter Device Location: Left;Finger  O2 Device: Nasal cannula  O2 Flow Rate (L/min): 2 L/min        Objective      Balance  Sitting Balance: Stand by assistance  Standing Balance: Contact guard assistance  Standing Balance  Time: 1 minute  Activity: No activity  Functional Mobility  Functional - Mobility Device: Rolling Walker  Activity: To/from bathroom  Assist Level: Contact guard assistance  Functional Mobility Comments: Very SOB with ambulation     Transfers  Sit to stand: Minimal assistance  Stand to sit: Contact guard assistance  Transfer Comments: Min A for first attempt from w/c, CGA for 2nd attempt.               Type of ROM/Therapeutic Exercise  Type of ROM/Therapeutic Exercise: Mary Alice  Comment: RUE: 12.5#, LUE: 10#; 4 sets of 15        Plan   Plan  Specific instructions for Next Treatment: AE, endurance training  Current Treatment Recommendations: Strengthening, Patient/Caregiver Education & Training, Home Management Training, Equipment Evaluation, Education, & procurement, Balance Training, Self-Care / ADL, Safety Education & Training, Endurance Training, Functional Mobility Training       OutComes Score       03/18/21 0900   Toilet Transfer   Assistance Needed Partial/moderate assistance   Comment Min A with BSC over toilet   CARE Score 3

## 2021-03-18 NOTE — PROGRESS NOTES
Mary Alice Hernandez  [id number]     03/18/21 4290 03/18/21 0854 03/18/21 0856   Subjective   Subjective Pt reports that he is not dressed or ready at time of therapy. --   --    Pain Screening   Patient Currently in Pain No  --   --    Bed Mobility   Rolling Stand by assistance  --   --    Supine to Sit Stand by assistance  --   --    Transfers   Sit to Stand Minimal Assistance  --   --    Stand to sit Minimal Assistance  --   --    Bed to Chair Minimal assistance  --   --    Ambulation   Ambulation?  --  Yes  --    Ambulation 1   Surface  --  level tile  --    Device  --  Rolling Walker  --    Other Apparatus  --  O2;Wheelchair follow  (2L)  --    Assistance  --  Contact guard assistance  --    Quality of Gait  --  Pt fatigued very quickly during amb. Pt continues to show forward flex posture and excessive weight bearing through UE's. --    Distance  --  13'  --    Exercises   Comments  --   --  Assisted Pt w/ dressing, cleaning, hygiene, and ADL's. Conditions Requiring Skilled Therapeutic Intervention   Body structures, Functions, Activity limitations  --   --  Decreased functional mobility ; Decreased ADL status; Decreased endurance;Decreased posture   Assessment  --   --  Pt was not dressed or ready at time of therapy session. Assisted Pt w/ dressing, cleaning, hygiene, and ADL's. Pt was more fatigued this morning requiring Min A for TF's. Pt had to stop amb early due to fatigue.    Prognosis  --   --  Good   Activity Tolerance   Activity Tolerance  --   --  Patient limited by fatigue;Patient limited by endurance   Electronically signed by Alesia Chaves PTA on 3/18/2021 at 9:04 AM

## 2021-03-18 NOTE — PROGRESS NOTES
Chrismyrna Dami  [id number]     03/18/21 1317 03/18/21 1318   Subjective   Subjective Pt agreed to therapy this afternoon. --    Transfers   Sit to Stand Contact guard assistance  --    Stand to sit Contact guard assistance  --    Ambulation   Ambulation? Yes  --    Ambulation 1   Surface level tile  --    Device Rolling Walker  --    Other Apparatus O2;Wheelchair follow  (2L)  --    Assistance Contact guard assistance  --    Quality of Gait Pt tolerated increase in amb distance better this afternoon, but still showing forward flex posture. --    Distance 36'  --    Exercises   Comments  --  Sitting Ishaan LE ther ex x 20 reps. Conditions Requiring Skilled Therapeutic Intervention   Body structures, Functions, Activity limitations  --  Decreased functional mobility ; Decreased ADL status; Decreased endurance;Decreased posture   Assessment  --  Pt was feeling better this afternoon and tolerated increase in amb distance well. Pt tolerated sitting ther ex well.    Prognosis  --  Good   Activity Tolerance   Activity Tolerance  --  Patient limited by fatigue;Patient limited by endurance   Electronically signed by Natalie Patel PTA on 3/18/2021 at 1:22 PM

## 2021-03-18 NOTE — PROGRESS NOTES
Patient:   Kat Maier  MR#:    [id number]   Room:    5706/708-32   YOB: 1940  Date of Progress Note: 3/18/2021  Time of Note                           9:11 AM  Consulting Physician:   Talib Barbosa M.D. Attending Physician:  Talib Barbosa MD     Chief complaint Respiratory failure     S:This 79 y.o. male  with a past medical history of bladder cancer, HTN, CKD stage III, PVD, hyperlipidemia,CAD,COPD,CHF has oxygen at home but doesn't wear if often,ETOH use drinks 2 glasses of wine every night and former smoker. He presented to [facility] ER on 3/6/21 with shortness of breath. CXR done showed increased pulmonary vascular congestion and CT of chest showed bilateral pleural effusion. He was admitted to the hospitalist service with Acute on chronic hypoxemic and hypercapnic respiratory, COPD exacerbation and possible recurrent pneumonia. Pulmonology was consulted. He was seen by Dr. Nora Lion, who didn't feel he had pneumoinia st this time, but did recommend Trilogy at discharge d/t patient not tolerating Bi-PAP for questionable sleep apnea. Patient had an episode of A-Fib with RVR. Cardiology was consulted. He was seen by Dr. Parul Rodríguez. He underwent cardiac catheterization by Dr. Kerri Dorantes on 3/10/21 revealing 100% occlusion right coronary artery which appears chronic in nature well collateralized from the left no significant disease on the left. Dr. Kerri Dorantes recommended placement of a dual-chamber pacemaker. Patient was in agreement and went or surgery on 3/13/21. He tolerated the procedure well. SPT was consulted to evaluate swallow. He was noted to have oropharyngeal phase dysphagia but no s/s of aspiration, he was placed on a regular diet with thin liquids. He is participating in both PT/OT. He is felt to need a stay on Rehab to work towards his goal of returning home with his wife. He is now felt ready to start the Rehab program. No new issues.     REVIEW OF SYSTEMS:  Constitutional: No fevers No chills  Neck:No stiffness  Respiratory: some shortness of breath  Cardiovascular: No chest pain No palpitations  Gastrointestinal: No abdominal pain    Genitourinary: No Dysuria  Neurological: No headache, no confusion    Past Medical History:      Diagnosis Date    Acute liver failure without hepatic coma 10/23/2018    Back pain     \"with tired legs as a result\"    Bladder cancer ([facility] 12/19/2018    Blood circulation, collateral     Carotid arterial disease ([facility]     recent surgery    CKD (chronic kidney disease), 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/2018    COPD with acute lower respiratory infection ([address] 2/24/2021    GERD (gastroesophageal reflux disease)     Hyperlipidemia     Hypertension     Hypertension     Palliative care patient 10/23/2018    Pneumonia due to infectious organism 11/06/2018    Primary osteoarthritis of left knee 10/14/2018    PVD (peripheral vascular disease) (HCC)     Tremor     Tremor on Right side x 1-2 weeks per stepdaughter    Type 2 diabetes mellitus with complication, without long-term current use of insulin ([facility] 1/21/2021       Past Surgical History:      Procedure Laterality Date    BACK SURGERY      COLONOSCOPY  2007?     CYSTOSCOPY Bilateral 12/19/2018    CYSTOSCOPY, BIOSPY FULGURATION OF BLADDER TUMOR POSSIBLE TURBT, RETROGRADE PYELOGRAM performed by Kamila Georges MD [facility] COLONOSCOPY FLX DX W/COLLJ SPEC WHEN PFRMD N/A 9/11/2017    Dr Sherif Dahl internal hemorrhoids, diverticular disease-HP-No recall (age)   [facility] REVISE MEDIAN N/CARPAL TUNNEL SURG Left 7/18/2018    OPEN CARPAL TUNNEL RELEASE performed by Miguel Winkler MD at [address] Left 8/28/2018    LEFT CAROTID ENDARTERECTOMY WITH VEIN PATCH ANGIOPLASTY AND COMPLETION ANGIOGRAM performed by Sarah Parrish MD at [place] [address] Left 10/15/2018    LEFT COMPLEX TOTAL KNEE ARTHROPLASTY performed by Miguel Winkler MD at [place] [address] AND ADENOIDECTOMY      VASCULAR SURGERY  04/21/2015    Mena BESS Ultrasound guided access of left common femoral artery. Aortogram.Diagnostic right lower extremity arteriogram.Radiologic supervision and interpretation.  VASCULAR SURGERY  01/13/2015    Mena Cyr M.D Atherectomy,angioplasty,and stenting of left superficial femoral artery.  VASCULAR SURGERY  03/11/2014    Mena Cyr M.D. Ultrasound-guided access of right common femoral artery. Aortogram.Left lower extremity arteriogram.Atherectomy and angioplasty of left superficial femoral artery. Radiologic supervision and interpretation.  VASCULAR SURGERY  01/18/2013    Mena BESS Aortogram.Multistation arteriogram right lower extremity. Laser atherectomy and angioplasty of right superficial femoral artery. Selective catheterization of right tibioperoneal trunk. Angioplasty of peroneal artery and tibioperoneal trunk.  VASCULAR SURGERY  10/30/2018    [facility]. Ultrasound guided cannulation of right internal vein. Placement of right internal jugular vein tunneled dialysis catheter bard equistream xk 23cm tip to cuff    VASCULAR SURGERY  12/17/2018    [facility]. Removal of tunneled dilaysis catheter right internal jugular vein.        Medications in Hospital:      Current Facility-Administered Medications:     linaclotide (LINZESS) capsule 145 mcg, 145 mcg, Oral, QA AC, Marichuy Gonzalez MD, 145 mcg at 03/17/21 0908    iron sucrose (VENOFER) injection 100 mg, 100 mg, Intravenous, Daily, Marichuy Gonzalez MD, 100 mg at 03/18/21 0809    sodium chloride flush 0.9 % injection 10 mL, 10 mL, Intravenous, BID, Marichuy Gonzalez MD, 10 mL at 03/18/21 0815    ondansetron (ZOFRAN) injection 4 mg, 4 mg, Intravenous, Q6H PRN, Johnny Ni MD    sodium chloride flush 0.9 % injection 10 mL, 10 mL, Intravenous, PRN, Johnny Ni MD    [DISCONTINUED] acetaminophen (TYLENOL) tablet 650 mg, 650 mg, Oral, Q6H PRN **OR** acetaminophen (TYLENOL) XL) extended release tablet 60 mg, 60 mg, Oral, Daily, Gia Pena MD, 60 mg at 03/18/21 0806    pantoprazole (PROTONIX) tablet 40 mg, 40 mg, Oral, Daily, Gia Pena MD, 40 mg at 03/18/21 0813    [COMPLETED] predniSONE (DELTASONE) tablet 10 mg, 10 mg, Oral, Daily, 10 mg at 03/17/21 0907 **FOLLOWED BY** predniSONE (DELTASONE) tablet 5 mg, 5 mg, Oral, Daily, Gia Pena MD, 5 mg at 03/18/21 8408    tamsulosin (FLOMAX) capsule 0.4 mg, 0.4 mg, Oral, BID, Gia Pena MD, 0.4 mg at 03/18/21 0813    [START ON 3/22/2021] vitamin D (ERGOCALCIFEROL) capsule 50,000 Units, 50,000 Units, Oral, Weekly, Gia Pena MD    acetaminophen (TYLENOL) tablet 650 mg, 650 mg, Oral, Q4H PRN, Jeannie Stroud MD    polyethylene glycol (GLYCOLAX) packet 17 g, 17 g, Oral, Daily PRN, Jeannie Stroud MD    Allergies:  Eliquis [apixaban] and Promethazine hcl    Social History:   TOBACCO:   reports that he quit smoking about 17 years ago. He has never used smokeless tobacco.  ETOH:   reports current alcohol use of about 12.0 standard drinks of alcohol per week. Family History:       Problem Relation Age of Onset    Colon Cancer Father     Diabetes Brother     Colon Polyps Neg Hx     Liver Cancer Neg Hx     Liver Disease Neg Hx     Esophageal Cancer Neg Hx     Rectal Cancer Neg Hx     Stomach Cancer Neg Hx          PHYSICAL EXAM:  /64   Pulse 60   Temp 98 °F (36.7 °C) (Temporal)   Resp 18   Ht 6' (1.829 m)   Wt 250 lb (113.4 kg)   SpO2 95%   BMI 33.91 kg/m²     Constitutional - well developed, well nourished.    Eyes - conjunctiva normal.   Ear, nose, throat - No scars, masses, or lesions over external nose or ears, no atrophy of tongue  Neck-symmetric, no masses noted, no jugular vein distension  Respiration- chest wall appears symmetric, good expansion,   normal effort without use of accessory muscles  Musculoskeletal - no significant wasting of muscles noted, no bony deformities  Extremities-no clubbing, cyanosis or edema  Skin - warm, dry, and intact. No rash, erythema, or pallor. Psychiatric - mood, affect, and behavior appear normal.      Neurological exam  Awake, alert, fluent oriented appropriate affect  Attention and concentration appear appropriate  Recent and remote memory appears unremarkable  Speech normal without dysarthria  No clear issues with language of fund of knowledge     Cranial Nerve Exam     CN III, IV,VI-EOMI, No nystagmus, conjugate eye movements, no ptosis    CN VII-no facial assymetry       Motor Exam  antigravity throughout upper and lower extremities bilaterally      Tremors- no tremors in hands or head noted     Gait  Not tested        Nursing/pcp notes, imaging,labs and vitals reviewed.      PT,OT and/or speech notes reviewed    Lab Results   Component Value Date    WBC 9.2 03/17/2021    HGB 7.0 (L) 03/17/2021    HCT 23.3 (L) 03/17/2021    MCV 95.9 (H) 03/17/2021     (L) 03/17/2021     Lab Results   Component Value Date     03/16/2021    K 5.0 03/16/2021     03/16/2021    CO2 24 03/16/2021    BUN 48 (H) 03/16/2021    CREATININE 1.6 (H) 03/16/2021    GLUCOSE 176 (H) 03/16/2021    CALCIUM 8.3 (L) 03/16/2021    PROT 4.9 (L) 03/16/2021    LABALBU 2.9 (L) 03/16/2021    BILITOT 0.5 03/16/2021    ALKPHOS 73 03/16/2021    AST 20 03/16/2021    ALT 17 03/16/2021    LABGLOM 42 (A) 03/16/2021    GFRAA 51 (L) 03/16/2021     Lab Results   Component Value Date    INR 1.11 03/15/2021    INR 1.13 03/06/2021    INR 1.16 01/21/2021    PROTIME 14.3 03/15/2021    PROTIME 14.4 03/06/2021    PROTIME 14.8 (H) 01/21/2021       Francisco Todd   Student Physical Therapist   Physical Therapy   Progress Notes   Signed   Date of Service:  3/16/2021 12:01 PM               Signed             Show:Clear all  [x]Manual[x]Template[]Copied    Added by:  [x]Kae Karimi    []Dorian for details     Physical Therapy EVALUATION Note  DATE:  3/16/2021  NAME: Matthew Jones  :  1940  (79 y.o.,male)  MRN:  [id number]     HEIGHT:  Height: 6' (182.9 cm)  WEIGHT:  Weight: 250 lb (113.4 kg)     PATIENT DIAGNOSIS(ES):    Diagnosis: PACEMAKER PLACEMENT, CHRONIC HYPOXIA AND HYPERCAPNIA      Additional Pertinent Hx: HTN, HEART FAILURE, CKDIII, PVD, GERD, COPD, ANEMIA, T2DM, A-FIB   RESTRICTIONS/PRECAUTIONS:    Restrictions/Precautions  Restrictions/Precautions: Surgical Protocols, Fall Risk  Implants present? : Pacemaker  Position Activity Restriction  Other position/activity restrictions: PACEMAKER RESTRICTIONS TO LUE   OVERALL  ORIENTATION STATUS:  PAIN:  Pain Level: 0                    NEUROLOGICAL                 STRENGTH  Strength RLE  Strength RLE: WFL  Strength LLE  Strength LLE: WFL  ROM  AROM RLE (degrees)  RLE AROM: WFL  AROM LLE (degrees)  LLE AROM : WFL  POSTURE/BALANCE          ACTIVITY TOLERANCE  Activity Tolerance  Activity Tolerance: Patient Tolerated treatment well, Patient limited by endurance, Patient limited by fatigue      BED MOBILITY  Bed Mobility  Bridging: Stand by assistance  Rolling: Stand by assistance(LEFT SIDE OF BED; TRIPLANAR )  Supine to Sit: Stand by assistance(LEFT SIDE OF BED; TRIPLANAR )  Sit to Supine: Stand by assistance(LEFT SIDE OF BED; TRIPLANAR )  Comment: REQUIRED INCREASED REST BREAKS DUE TO POOR ENDURANCE AND SOB   TRANSFERS  Sit to Stand: Contact guard assistance(RW)      Bed to Chair: Contact guard assistance        WHEELCHAIR PROPULSION 1  Propulsion 1  Propulsion: Manual  Level: Level Tile  Method: CARLITO HERNÁNDEZ  Level of Assistance: Stand by assistance, Contact guard assistance  Description/ Details: 35 FT   Distance: R AND L TURNS. PT FATIGUES MORE EASILY.  SLOW LYNNETTE   WHEELCHAIR PROPULSION 2  AMBULATION 1  AMBULATION 2  STAIRS     GOALS:  Short term goals  Time Frame for Short term goals: 1 WEEK   Short term goal 1: SUPERVISION BED MOBILITY   Short term goal 2: SUPEVISION STS TF W/ RW   Short term goal 3: CGA 50 FT AMBULATION WITH RW   Short term goal 4: CGA CAR TF WITH RW   Short term goal 5: SUPERVISION HEP      Long term goals  Time Frame for Long term goals : 2-3 WEEKS   Long term goal 1: INDEPENDENT BED MOBILITY   Long term goal 2: INDEPENDENT STS TF   Long term goal 3: SUPERVISION 100 FT AMBULATION RW FOR SAFETY DUE TO DECREASED CV ENDURANCE   Long term goal 4: INDEPENDENT STAIR NEGOTIATION 4 STEPS  Long term goal 5: INDEPENDENT HEP  HOME LIVING:  Type of Home: House  Home Layout: One level  Home Access: Stairs to enter with rails  Entrance Stairs - Number of Steps: 2 steps from garage  Entrance Stairs - Rails: Both  ASSESSMENT (IMPAIRMENTS/BARRIERS): Body structures, Functions, Activity limitations: Decreased functional mobility , Decreased ADL status, Decreased endurance, Decreased posture  Assessment: PT REQUIRES INCREASED REST BREAKS BETWEEN BOUTS OF BED MOBILITY, CHANGING/DRESSING, AND TF DUE TO DECREASED ENDURANCE. WILL REQUIRE SKILLED THERAPY TO IMPROVE CV ENDURANCE TO ASSIST WITH COMMUNITY AMBULATION, TF, BED MOBILITY, AND OTHER ACTIVITIES TO SAFELY D/C HOME.     Activity Tolerance: Patient Tolerated treatment well, Patient limited by endurance, Patient limited by fatigue  Specific instructions for Next Treatment: ASSESS AMBULATION, CAR TF AND BEGIN LE STRENGTHENING   PLAN:  Plan  Times per week: 5-6  Times per day: (1-2)  Plan weeks: 2-3  Specific instructions for Next Treatment: ASSESS AMBULATION, CAR TF AND BEGIN LE STRENGTHENING   Current Treatment Recommendations: Strengthening, ROM, Balance Training, Functional Mobility Training, Transfer Training, Endurance Training, Wheelchair Mobility Training, Gait Training, Stair training, Home Exercise Program, Safety Education & Training, Patient/Caregiver Education & Training  Discharge Recommendations: Continue to assess pending progress  PATIENT REQUIRES AND IS REASONABLY EXPECTED TO ACTIVELY PARTICIPATE IN AT LEAST 3 HOURS OF INTENSIVE THERAPY PER DAY AT LEAST 5 DAYS PER WEEK, AND BE EXPECTED TO MAKE MEASURABLE IMPROVEMENT THAT WILL BE OF PRACTICAL VALUE TO IMPROVE THE PATIENT'S FUNCTIONAL CAPACITY OR ADAPTATION TO IMPAIRMENTS.    PATIENT GOAL FOR REHAB:  RETURN TO PRIOR LEVEL OF FUNCTION         IRF/RHONDA  Roll Left and Right  Assistance Needed: Supervision or touching assistance  CARE Score: 4  Discharge Goal: Independent     Sit to Lying  Assistance Needed: Supervision or touching assistance  CARE Score: 4  Discharge Goal: Independent     Lying to Sitting on Side of Bed  Assistance Needed: Supervision or touching assistance  CARE Score: 4  Discharge Goal: Independent     Sit to Stand  Assistance Needed: Supervision or touching assistance  CARE Score: 4  Discharge Goal: Independent     Chair/Bed-to-Chair Transfer  Assistance Needed: Supervision or touching assistance  CARE Score: 4  Discharge Goal: Independent     Car Transfer  Reason if not Attempted: Not attempted due to medical condition or safety concerns  CARE Score: 88  Discharge Goal: Independent     Walk 10 Feet  Reason if not Attempted: Not attempted due to medical condition or safety concerns  CARE Score: 88  Discharge Goal: Independent     Walk 50 Feet with Two Turns  Reason if not Attempted: Not attempted due to medical condition or safety concerns  CARE Score: 88  Discharge Goal: Independent     Walk 150 Feet  Reason if not Attempted: Not attempted due to medical condition or safety concerns  CARE Score: 88  Discharge Goal: Independent     Walking 10 Feet on Uneven Surfaces  Reason if not Attempted: Not attempted due to medical condition or safety concerns  CARE Score: 88  Discharge Goal: Supervision or touching assistance     1 Step (Curb)  Reason if not Attempted: Not attempted due to medical condition or safety concerns  CARE Score: 88  Discharge Goal: Independent     4 Steps  Reason if not Attempted: Not attempted due to medical condition or safety concerns  CARE Score: 88  Discharge Goal: Independent     12 Steps  Reason if not Attempted: Not attempted due to medical condition or safety concerns  CARE Score: 88  Discharge Goal: Not Attempted     Wheel 50 Feet with Two Turns  Assistance Needed: Supervision or touching assistance  CARE Score: 4  Discharge Goal: Independent     Wheel 150 Feet  Reason if not Attempted: Not attempted due to medical condition or safety concerns  CARE Score: 88  Discharge Goal: Supervision or touching assistance        LAST TREATMENT TIME  PT Individual Minutes  Time In: 1000  Time Out: 1100  Minutes: 60                                           Cosigned by: Belinda Lanes, PT at 3/16/2021  3:36 PM   Revision History                          RECORD REVIEW: Previous medical records, medications were reviewed at today's visit    IMPRESSION:   1. Respiratory failure. CHF/COPD-O2/Duonebs PRN/tapering prednisone/Trilogy  2. Atrial fibrillation/S/P PPM-Amiodarone/Procardia XL/metorprolol  3. CAD/PVD-ASA/statin  4. Hyperlipidemia-on statin  5. Anemia-on iron  6. HTN-on meds monitor  7. GI-bowel regimen/PPI  8. BPH/history of bladder cancer-on meds  9. Vitamin D deficiency-on Vitamin D  10. DVT prophylaxis-SCDs  11. ETOH use-folic acid  12. Chronic kidney disease-monitor   13. Thrombocytopenia-monitor   14. Back pain/arthritis-Tylenol  15.  PT/OT/Speech     Continue current care      Expected duration and frequency therapy: 180 minutes per day, 5 days per week    [address] [place] [address]  [phone] CELL  Dr Hallie Dee

## 2021-03-19 ENCOUNTER — TELEPHONE (OUTPATIENT)
Dept: HEMATOLOGY | Age: 81
End: 2021-03-19

## 2021-03-19 LAB
ALBUMIN SERPL-MCNC: 3.1 G/DL (ref 3.5–5.2)
ALP BLD-CCNC: 81 U/L (ref 40–130)
ALT SERPL-CCNC: 14 U/L (ref 5–41)
ANION GAP SERPL CALCULATED.3IONS-SCNC: 7 MMOL/L (ref 7–19)
AST SERPL-CCNC: 13 U/L (ref 5–40)
BASOPHILS ABSOLUTE: 0 K/UL (ref 0–0.2)
BASOPHILS RELATIVE PERCENT: 0.1 % (ref 0–1)
BILIRUB SERPL-MCNC: 0.6 MG/DL (ref 0.2–1.2)
BUN BLDV-MCNC: 47 MG/DL (ref 8–23)
CALCIUM SERPL-MCNC: 8.3 MG/DL (ref 8.8–10.2)
CHLORIDE BLD-SCNC: 105 MMOL/L (ref 98–111)
CO2: 28 MMOL/L (ref 22–29)
CREAT SERPL-MCNC: 1.9 MG/DL (ref 0.5–1.2)
EOSINOPHILS ABSOLUTE: 0 K/UL (ref 0–0.6)
EOSINOPHILS RELATIVE PERCENT: 0 % (ref 0–5)
GFR AFRICAN AMERICAN: 42
GFR NON-AFRICAN AMERICAN: 34
GLUCOSE BLD-MCNC: 137 MG/DL (ref 74–109)
GLUCOSE BLD-MCNC: 194 MG/DL (ref 70–99)
GLUCOSE BLD-MCNC: 204 MG/DL (ref 70–99)
GLUCOSE BLD-MCNC: 218 MG/DL (ref 70–99)
GLUCOSE BLD-MCNC: 248 MG/DL (ref 70–99)
HAV IGM SER IA-ACNC: NORMAL
HCT VFR BLD CALC: 23.4 % (ref 42–52)
HEMOGLOBIN: 7 G/DL (ref 14–18)
HEPATITIS B CORE IGM ANTIBODY: NORMAL
HEPATITIS B SURFACE ANTIGEN INTERPRETATION: NORMAL
HEPATITIS C ANTIBODY INTERPRETATION: NORMAL
HIV-1 P24 AG: NORMAL
IGA: 133 MG/DL (ref 70–400)
IGG: 563 MG/DL (ref 700–1600)
IGM: 150 MG/DL (ref 40–230)
IMMATURE CELLS: 11.9 % (ref 0.9–6.5)
IMMATURE GRANULOCYTES #: 0.4 K/UL
LYMPHOCYTES ABSOLUTE: 0.7 K/UL (ref 1.1–4.5)
LYMPHOCYTES RELATIVE PERCENT: 8.4 % (ref 20–40)
MCH RBC QN AUTO: 29.5 PG (ref 27–31)
MCHC RBC AUTO-ENTMCNC: 29.9 G/DL (ref 33–37)
MCV RBC AUTO: 98.7 FL (ref 80–94)
MONOCYTES ABSOLUTE: 0.7 K/UL (ref 0–0.9)
MONOCYTES RELATIVE PERCENT: 8.4 % (ref 0–10)
NEUTROPHILS ABSOLUTE: 6.4 K/UL (ref 1.5–7.5)
NEUTROPHILS RELATIVE PERCENT: 78.6 % (ref 50–65)
PDW BLD-RTO: 17.4 % (ref 11.5–14.5)
PERFORMED ON: ABNORMAL
PLATELET # BLD: 88 K/UL (ref 130–400)
PLATELET SLIDE REVIEW: ABNORMAL
PMV BLD AUTO: 13.6 FL (ref 9.4–12.4)
POTASSIUM REFLEX MAGNESIUM: 4.8 MMOL/L (ref 3.5–5)
RAPID HIV 1&2: NORMAL
RBC # BLD: 2.37 M/UL (ref 4.7–6.1)
SODIUM BLD-SCNC: 140 MMOL/L (ref 136–145)
TOTAL PROTEIN: 4.6 G/DL (ref 6.6–8.7)
WBC # BLD: 8.2 K/UL (ref 4.8–10.8)

## 2021-03-19 PROCEDURE — 83883 ASSAY NEPHELOMETRY NOT SPEC: CPT

## 2021-03-19 PROCEDURE — 6370000000 HC RX 637 (ALT 250 FOR IP): Performed by: HOSPITALIST

## 2021-03-19 PROCEDURE — 6360000002 HC RX W HCPCS: Performed by: PSYCHIATRY & NEUROLOGY

## 2021-03-19 PROCEDURE — 36415 COLL VENOUS BLD VENIPUNCTURE: CPT

## 2021-03-19 PROCEDURE — 87806 HIV AG W/HIV1&2 ANTB W/OPTIC: CPT

## 2021-03-19 PROCEDURE — 82947 ASSAY GLUCOSE BLOOD QUANT: CPT

## 2021-03-19 PROCEDURE — 84155 ASSAY OF PROTEIN SERUM: CPT

## 2021-03-19 PROCEDURE — 85025 COMPLETE CBC W/AUTO DIFF WBC: CPT

## 2021-03-19 PROCEDURE — 1180000000 HC REHAB R&B

## 2021-03-19 PROCEDURE — 80053 COMPREHEN METABOLIC PANEL: CPT

## 2021-03-19 PROCEDURE — 2700000000 HC OXYGEN THERAPY PER DAY

## 2021-03-19 PROCEDURE — 80074 ACUTE HEPATITIS PANEL: CPT

## 2021-03-19 PROCEDURE — 82525 ASSAY OF COPPER: CPT

## 2021-03-19 PROCEDURE — 6370000000 HC RX 637 (ALT 250 FOR IP): Performed by: PSYCHIATRY & NEUROLOGY

## 2021-03-19 PROCEDURE — 99223 1ST HOSP IP/OBS HIGH 75: CPT | Performed by: INTERNAL MEDICINE

## 2021-03-19 PROCEDURE — 82784 ASSAY IGA/IGD/IGG/IGM EACH: CPT

## 2021-03-19 PROCEDURE — 99232 SBSQ HOSP IP/OBS MODERATE 35: CPT | Performed by: PSYCHIATRY & NEUROLOGY

## 2021-03-19 PROCEDURE — 97530 THERAPEUTIC ACTIVITIES: CPT

## 2021-03-19 PROCEDURE — 97116 GAIT TRAINING THERAPY: CPT

## 2021-03-19 PROCEDURE — 93971 EXTREMITY STUDY: CPT

## 2021-03-19 PROCEDURE — 97535 SELF CARE MNGMENT TRAINING: CPT

## 2021-03-19 PROCEDURE — 86038 ANTINUCLEAR ANTIBODIES: CPT

## 2021-03-19 PROCEDURE — 82668 ASSAY OF ERYTHROPOIETIN: CPT

## 2021-03-19 PROCEDURE — 85055 RETICULATED PLATELET ASSAY: CPT

## 2021-03-19 PROCEDURE — 84165 PROTEIN E-PHORESIS SERUM: CPT

## 2021-03-19 PROCEDURE — 97110 THERAPEUTIC EXERCISES: CPT

## 2021-03-19 PROCEDURE — 84630 ASSAY OF ZINC: CPT

## 2021-03-19 RX ADMIN — METOPROLOL SUCCINATE 100 MG: 50 TABLET, EXTENDED RELEASE ORAL at 07:49

## 2021-03-19 RX ADMIN — INSULIN LISPRO 1 UNITS: 100 INJECTION, SOLUTION INTRAVENOUS; SUBCUTANEOUS at 07:48

## 2021-03-19 RX ADMIN — AMIODARONE HYDROCHLORIDE 200 MG: 200 TABLET ORAL at 21:01

## 2021-03-19 RX ADMIN — IRON SUCROSE 100 MG: 20 INJECTION, SOLUTION INTRAVENOUS at 07:48

## 2021-03-19 RX ADMIN — TAMSULOSIN HYDROCHLORIDE 0.4 MG: 0.4 CAPSULE ORAL at 07:48

## 2021-03-19 RX ADMIN — PANTOPRAZOLE SODIUM 40 MG: 40 TABLET, DELAYED RELEASE ORAL at 07:49

## 2021-03-19 RX ADMIN — ATORVASTATIN CALCIUM 20 MG: 20 TABLET, FILM COATED ORAL at 07:49

## 2021-03-19 RX ADMIN — PREDNISONE 5 MG: 5 TABLET ORAL at 07:48

## 2021-03-19 RX ADMIN — AMIODARONE HYDROCHLORIDE 200 MG: 200 TABLET ORAL at 07:49

## 2021-03-19 RX ADMIN — ASPIRIN 81 MG: 81 TABLET, FILM COATED ORAL at 07:49

## 2021-03-19 RX ADMIN — LINACLOTIDE 145 MCG: 145 CAPSULE, GELATIN COATED ORAL at 06:20

## 2021-03-19 RX ADMIN — FOLIC ACID 1 MG: 1 TABLET ORAL at 07:49

## 2021-03-19 RX ADMIN — INSULIN LISPRO 2 UNITS: 100 INJECTION, SOLUTION INTRAVENOUS; SUBCUTANEOUS at 17:20

## 2021-03-19 RX ADMIN — FERROUS SULFATE TAB 325 MG (65 MG ELEMENTAL FE) 325 MG: 325 (65 FE) TAB at 07:48

## 2021-03-19 RX ADMIN — NIFEDIPINE 60 MG: 60 TABLET, EXTENDED RELEASE ORAL at 07:48

## 2021-03-19 RX ADMIN — INSULIN LISPRO 2 UNITS: 100 INJECTION, SOLUTION INTRAVENOUS; SUBCUTANEOUS at 11:57

## 2021-03-19 RX ADMIN — FERROUS SULFATE TAB 325 MG (65 MG ELEMENTAL FE) 325 MG: 325 (65 FE) TAB at 17:22

## 2021-03-19 RX ADMIN — TAMSULOSIN HYDROCHLORIDE 0.4 MG: 0.4 CAPSULE ORAL at 21:01

## 2021-03-19 ASSESSMENT — ENCOUNTER SYMPTOMS
SHORTNESS OF BREATH: 1
CONSTIPATION: 0
ABDOMINAL PAIN: 0
DIARRHEA: 0
NAUSEA: 0
EYE DISCHARGE: 0
VOMITING: 0
EYE ITCHING: 0
BLOOD IN STOOL: 0
WHEEZING: 1
COUGH: 1

## 2021-03-19 NOTE — PROGRESS NOTES
Pharmacy Consult      Yoana Sharpe is a 79 y.o. male for whom pharmacy has been consulted to review medications from 3/6 inpatient admission and 3/15 rehab admission for possible contributors for thrombocytopenia.     Patient Active Problem List   Diagnosis    Diverticulitis of large intestine with perforation without bleeding    Generalized abdominal pain    Colonic diverticular abscess    Bilateral carotid artery stenosis    Atherosclerosis of native artery of both lower extremities with intermittent claudication (HCC)    Carotid stenosis, asymptomatic, left    Primary osteoarthritis of left knee    Arthritis of knee    Essential hypertension    Pure hypercholesterolemia    Slow transit constipation    Iron deficiency anemia    GERD (gastroesophageal reflux disease)    Acute liver failure without hepatic coma    Acute kidney injury (HCC)    CHF (congestive heart failure) (HCC)    Bilateral pleural effusion    Elevated troponin    Palliative care patient    Shock liver    Acute renal failure (HCC)    BPH associated with nocturia    Bleeding diathesis (HCC)    Epistaxis    Acute blood loss anemia    Melena    Thrombocytopenia (HCC)    Hypocalcemia    Pneumonia due to infectious organism    Bladder cancer (HCC)    Postoperative pain    History of bladder cancer    Severe sepsis (HCC)    Acute on chronic respiratory failure (HCC)    Acute on chronic kidney failure (HCC)    Hypoxemia    Metabolic acidosis    Acidosis, metabolic, with respiratory acidosis    Acute respiratory failure (HCC)    Type 2 diabetes mellitus with complication, without long-term current use of insulin (HCC)    Weakness    Other dysphagia    Chronic midline low back pain without sciatica    COPD with acute lower respiratory infection ([address]    Chronic kidney disease    Recurrent pneumonia    Nonsustained ventricular tachycardia (HCC)    Atrial fibrillation (HCC)    Vitamin D deficiency    Anemia    Alcohol use    Pacemaker       Allergies: Eliquis [apixaban] and Promethazine hcl     Recent Labs     03/19/21  0432   CREATININE 1.9*       Ht/Wt:   Ht Readings from Last 1 Encounters:   03/15/21 6' (1.829 m)        Wt Readings from Last 1 Encounters:   03/15/21 250 lb (113.4 kg)         Estimated Creatinine Clearance: 41 mL/min (A) (based on SCr of 1.9 mg/dL (H)). Assessment/Plan: The following medications that the patient has received dose(s) of have thrombocytopenia listed as a possible adverse reaction:  Amiodarone  Atorvastatin  Bumetanide  Cefazolin  Cefuroxime  Digoxin  Diltiazem  Doxycycline  Enoxaparin  Furosemide  Levofloxacin  Meropenem  Metoprolol  Nifedipine  Pantoprazole  Vancomycin    Thank you for the consult.       Electronically signed by Beryle Mote, [address] [place] [address] on 3/19/2021 at 4:17 PM

## 2021-03-19 NOTE — PLAN OF CARE
Problem: Falls - Risk of:  Goal: Will remain free from falls  Description: Will remain free from falls  3/18/2021 2346 by Benson Ulrich LPN  Outcome: Ongoing  3/18/2021 1121 by Nam Fountain RN  Outcome: Ongoing  Goal: Absence of physical injury  Description: Absence of physical injury  3/18/2021 2346 by Benson Ulrich LPN  Outcome: Ongoing  3/18/2021 1121 by Nam Fountain RN  Outcome: Ongoing     Problem: IP BLADDER/VOIDING  Goal: LTG - patient will complete bladder elimination  3/18/2021 2346 by Benson Ulrich LPN  Outcome: Ongoing  3/18/2021 1121 by Nam Fountain RN  Outcome: Ongoing  Goal: LTG - Patient will utilize adaptive techniques/equipment to complete bladder elimination  3/18/2021 2346 by Benson Ulrich LPN  Outcome: Ongoing  3/18/2021 1121 by Nam Fountain RN  Outcome: Ongoing  Goal: LTG - patient will achieve acceptable level of continence  3/18/2021 2346 by Benson Ulrich LPN  Outcome: Ongoing  3/18/2021 1121 by Nam Fountain RN  Outcome: Ongoing  Goal: STG - Patient demonstrates ability to take care of indwelling catheter  3/18/2021 2346 by Benson Ulrich LPN  Outcome: Ongoing  3/18/2021 1121 by Nam Fountain RN  Outcome: Ongoing  Goal: STG - patient demonstrates self-cath technique using clean technique and care of the catheter  3/18/2021 2346 by Benson Ulrich LPN  Outcome: Ongoing  3/18/2021 1121 by Nam Fountain RN  Outcome: Ongoing  Goal: STG - Patient demonstrates no accidents  3/18/2021 2346 by Benson Urlich LPN  Outcome: Ongoing  3/18/2021 1121 by Nam Fountain RN  Outcome: Ongoing  Goal: STG - Patient will state signs and symptoms of UTI  3/18/2021 2346 by Benson Ulrich LPN  Outcome: Ongoing  3/18/2021 1121 by Nam Fountain RN  Outcome: Ongoing  Goal: STG - patient will be able to empty bladder  3/18/2021 2346 by Benson Ulrich LPN  Outcome: Ongoing  3/18/2021 1121 by Nam Fountain RN  Outcome: Ongoing  Goal: STG - Patient demonstrates understanding of self catheterization schedule by completing task on time  3/18/2021 2346 by Luis Miguel Garza LPN  Outcome: Ongoing  3/18/2021 1121 by Ronnie Jeronimo RN  Outcome: Ongoing  Goal: STG - patient participates in bladder program by expressing need to void  3/18/2021 2346 by Luis Miguel Garza LPN  Outcome: Ongoing  3/18/2021 1121 by Ronnie Jeronimo RN  Outcome: Ongoing  Goal: STG - Patient verbalizes understanding of catheter care indwelling/intermittent  3/18/2021 2346 by Luis Miguel Garza LPN  Outcome: Ongoing  3/18/2021 1121 by Ronnie Jeronimo RN  Outcome: Ongoing  Goal: STG - patient participates in bladder program by adhering to implemented toileting schedule  3/18/2021 2346 by Luis Miguel Garza LPN  Outcome: Ongoing  3/18/2021 1121 by Ronine Jeronimo RN  Outcome: Ongoing     Problem: IP BOWEL ELIMINATION  Goal: LTG - patient will utilize adaptive techniques/equipment to complete bowel elimination  3/18/2021 2346 by Luis Miguel Garza LPN  Outcome: Ongoing  3/18/2021 1121 by Ronnie Jeronimo RN  Outcome: Ongoing  Goal: LTG - patient will have regular and routine bowel evacuation  3/18/2021 2346 by Luis Miguel Garza LPN  Outcome: Ongoing  3/18/2021 1121 by Ronnie Jeronimo RN  Outcome: Ongoing  Goal: STG - patient will be accident free  3/18/2021 2346 by Luis Miguel Garza LPN  Outcome: Ongoing  3/18/2021 1121 by Ronnie Jeronimo RN  Outcome: Ongoing  Goal: STG - Patient demonstrates care and management of ostomy bag  3/18/2021 2346 by Luis Miguel Garza LPN  Outcome: Ongoing  3/18/2021 1121 by Ronnie Jeronimo RN  Outcome: Ongoing  Goal: STG - Patient participates in bowel management program  3/18/2021 2346 by Luis Miguel Garza LPN  Outcome: Ongoing  3/18/2021 1121 by Ronnie Jeronimo RN  Outcome: Ongoing  Goal: STG - patient maintains skin integrity  3/18/2021 2346 by Luis Miguel Garza LPN  Outcome: Ongoing  3/18/2021 1121 by Ronnie Jreonimo RN  Outcome: Ongoing  Goal: STG - Patient will verbalize signs and symptoms of constipation and how to prevent/alleviate  3/18/2021 2346 by Catherine Lunsford LPN  Outcome: Ongoing  3/18/2021 1121 by Raj Davidson RN  Outcome: Ongoing  Goal: STG - patient will be continent of stool  3/18/2021 2346 by Catherine Lunsford LPN  Outcome: Ongoing  3/18/2021 1121 by Raj Davidson RN  Outcome: Ongoing  Goal: STG - Patient completes digital stimulation technique  3/18/2021 2346 by Catherine Lunsford LPN  Outcome: Ongoing  3/18/2021 1121 by Raj Davidson RN  Outcome: Ongoing  Goal: STG - Patient verbalizes knowledge about relationship between diet, fluid intake, activity and medication on constipation  3/18/2021 2346 by Catherine Lunsford LPN  Outcome: Ongoing  3/18/2021 1121 by Raj Davidson RN  Outcome: Ongoing     Problem: IP BREATHING  Goal: LTG - patient will mobilize secretions and maintain airway  3/18/2021 2346 by Catherine Lunsford LPN  Outcome: Ongoing  3/18/2021 1121 by Raj Davidson RN  Outcome: Ongoing  Goal: LTG - Patient/caregiver will demonstrate/perform proper techniques to maintain patent airway  3/18/2021 2346 by Catherine Lunsford LPN  Outcome: Ongoing  3/18/2021 1121 by Raj Davidson RN  Outcome: Ongoing  Goal: LTG - patient/caregiver will demonstrate/perform improved or stable self care abilities within physical limitations  3/18/2021 2346 by Catherine Lunsford LPN  Outcome: Ongoing  3/18/2021 1121 by Raj Davidson RN  Outcome: Ongoing  Goal: STG - Patient/caregiver will maintain patent airway  3/18/2021 2346 by Catherine Lunsford LPN  Outcome: Ongoing  3/18/2021 1121 by Raj Davidson RN  Outcome: Ongoing  Goal: STG - respiratory rate and effort will be within normal limits for the patient  3/18/2021 2346 by Catherine Lunsford LPN  Outcome: Ongoing  3/18/2021 1121 by Raj Davidson RN  Outcome: Ongoing  Goal: STG - patient/caregiver will be able to verbalize oxygen safety precautions  3/18/2021 2346 by Jigar Pérez LPN  Outcome: Ongoing  3/18/2021 1121 by Maddison Killian RN  Outcome: Ongoing  Goal: STG - Patient/caregiver demonstrates correct suctioning technique  3/18/2021 2346 by Jigar Pérez LPN  Outcome: Ongoing  3/18/2021 1121 by Maddison Killian RN  Outcome: Ongoing  Goal: STG - patient will utilize incentive spirometer  3/18/2021 2346 by Jigar Pérez LPN  Outcome: Ongoing  3/18/2021 1121 by Maddison Killian RN  Outcome: Ongoing  Goal: STG - Patient performs or directs assisted coughing  3/18/2021 2346 by Jigar Pérez LPN  Outcome: Ongoing  3/18/2021 1121 by Maddison Killian RN  Outcome: Ongoing  Goal: STG - patient can administer MDI's  3/18/2021 2346 by Jigar Pérez LPN  Outcome: Ongoing  3/18/2021 1121 by Maddison Killian RN  Outcome: Ongoing  Goal: STG - Patient can utilize incentive spirometer  3/18/2021 2346 by Jigar Pérez LPN  Outcome: Ongoing  3/18/2021 1121 by Maddison Killian RN  Outcome: Ongoing  Goal: STG - family can complete suctioning  3/18/2021 2346 by Jigar Pérez LPN  Outcome: Ongoing  3/18/2021 1121 by Maddison Killian RN  Outcome: Ongoing     Problem: NUTRITION  Goal: Patient maintains adequate hydration  3/18/2021 2346 by Jigar Pérez LPN  Outcome: Ongoing  3/18/2021 1121 by Maddison Killian RN  Outcome: Ongoing  Goal: Patient maintains weight  3/18/2021 2346 by Jigar Pérez LPN  Outcome: Ongoing  3/18/2021 1121 by Maddison Killian RN  Outcome: Ongoing  Goal: Patient/Family demonstrates understanding of diet  3/18/2021 2346 by Jigar Pérez LPN  Outcome: Ongoing  3/18/2021 1121 by Maddison Killian RN  Outcome: Ongoing  Goal: Patient/Family independently completes tube feeding  3/18/2021 2346 by Jigar Pérez LPN  Outcome: Ongoing  3/18/2021 1121 by Maddison Killian RN  Outcome: Ongoing  Goal: Patient will have no more than 5 lb weight change during LOS  3/18/2021 2346 by Jigar Pérez, LPN  Outcome: Ongoing  3/18/2021 1121 by Concha Che RN  Outcome: Ongoing  Goal: Patient will utilize adaptive techniques to administer nutrition  3/18/2021 2346 by Meron Presley, SILVERION  Outcome: Ongoing  3/18/2021 1121 by Concha Che RN  Outcome: Ongoing  Goal: Patient will verbalize dietary restrictions  3/18/2021 2346 by Meron Presley, LPN  Outcome: Ongoing  3/18/2021 1121 by Concha Che RN  Outcome: Ongoing     Problem: SAFETY  Goal: LTG - patient will adhere to hip precautions during ADL's and transfers  3/18/2021 2346 by Meron Presley, LPN  Outcome: Ongoing  3/18/2021 1121 by Concha Che RN  Outcome: Ongoing  Goal: LTG - Patient will demonstrate safety requirements appropriate to situation/environment  3/18/2021 2346 by Meron Presley LPN  Outcome: Ongoing  3/18/2021 1121 by Concha Che RN  Outcome: Ongoing  Goal: LTG - patient will utilize safety techniques  3/18/2021 2346 by Meron Presley LPN  Outcome: Ongoing  3/18/2021 1121 by Concha Che RN  Outcome: Ongoing  Goal: STG - patient locks brakes on wheelchair  3/18/2021 2346 by Meron Presley LPN  Outcome: Ongoing  3/18/2021 1121 by Concha Che RN  Outcome: Ongoing  Goal: STG - Patient uses call light consistently to request assistance with transfers  3/18/2021 2346 by Meron Presley, SILVERION  Outcome: Ongoing  3/18/2021 1121 by Concha Che RN  Outcome: Ongoing  Goal: STG - patient uses gait belt during all transfers  3/18/2021 2346 by Meron Presley, LPN  Outcome: Ongoing  3/18/2021 1121 by Concha Che RN  Outcome: Ongoing     Problem: SKIN INTEGRITY  Goal: LTG - Patient will be free from infection  3/18/2021 2346 by Meron Presley, LPN  Outcome: Ongoing  3/18/2021 1121 by Concha Che RN  Outcome: Ongoing  Goal: LTG - patient will maintain/improve skin integrity through proper skin care techniques  3/18/2021 2346 by Meron Presley, LPN  Outcome: Ongoing  3/18/2021 1121 by Monica Robin analgesics  3/18/2021 2346 by Iftikhar Verduzco LPN  Outcome: Ongoing  3/18/2021 1121 by Nataly White RN  Outcome: Ongoing  Goal: STG - pain is manageable through therapies  3/18/2021 2346 by Iftikhar Verduzco LPN  Outcome: Ongoing  3/18/2021 1121 by Nataly White RN  Outcome: Ongoing  Goal: STG - Patient will verbalize an acceptable level of pain  3/18/2021 2346 by Iftikhar Verduzco LPN  Outcome: Ongoing  3/18/2021 1121 by Nataly White RN  Outcome: Ongoing  Goal: STG - patients pain is managed to allow active participation in daily activities  3/18/2021 2346 by Iftikhar Verduzco LPN  Outcome: Ongoing  3/18/2021 1121 by Nataly White RN  Outcome: Ongoing  Goal: STG - Patient will increase activity level  3/18/2021 2346 by Iftikhar Verduzco LPN  Outcome: Ongoing  3/18/2021 1121 by Nataly White RN  Outcome: Ongoing  Goal: STG - patient verbalizes a reduction in pain level  3/18/2021 2346 by Iftikhar Verduzco LPN  Outcome: Ongoing  3/18/2021 1121 by Nataly White RN  Outcome: Ongoing

## 2021-03-19 NOTE — PROGRESS NOTES
Occupational Therapy  Facility/Department: [facility] 8 REHAB UNIT  Daily Treatment Note  NAME: Yoana Sharpe  : 1940  MRN: [id number]    Date of Service: 3/19/2021    Discharge Recommendations:  Home with Home health OT       Assessment   Performance deficits / Impairments: Decreased functional mobility ; Decreased endurance;Decreased strength;Decreased ROM; Decreased balance;Decreased ADL status; Decreased high-level IADLs  Assessment: Requires frequent rests breaks due to SOB and fatigue, but able to complete therapy session. O2 sats 92% on 2 L post shower ambulation from bathroom. Treatment Diagnosis: Pneumonia, recent pacemaker (3-13-21)  OT Education: ADL Adaptive Strategies;Transfer Training  Activity Tolerance  Activity Tolerance: Patient Tolerated treatment well  Activity Tolerance: Requires frequent rests breaks due to SOB and fatigue, but able to complete therapy session. Safety Devices  Safety Devices in place: Yes(Left with PT)  Type of devices: Left in chair         Patient Diagnosis(es): There were no encounter diagnoses. has a past medical history of Acute liver failure without hepatic coma, Back pain, Bladder cancer (HCC), Blood circulation, collateral, Carotid arterial disease ([address] CKD (chronic kidney disease), stage II, COPD with acute lower respiratory infection ([address] GERD (gastroesophageal reflux disease), Hyperlipidemia, Hypertension, Hypertension, Palliative care patient, Pneumonia due to infectious organism, Primary osteoarthritis of left knee, PVD (peripheral vascular disease) ([address] Tremor, and Type 2 diabetes mellitus with complication, without long-term current use of insulin ([address] has a past surgical history that includes back surgery;  Tonsillectomy and adenoidectomy; Colonoscopy (?); pr colonoscopy flx dx w/collj spec when pfrmd (N/A, 2017); pr revise median n/carpal tunnel surg (Left, 2018); pr thromboendartectmy neck,neck incis (Left, 2018); vascular term goal 1: pt will complete LB dressing with AE prn with CGA  Short term goal 2: pt will complete overall toileting with CGA  Short term goal 3: pt will complete simple ambulatory home making task with CGA while maintaining pacemaker precautions  Short term goal 4: pt will complete overall bathing with CGA  Short term goal 5: pt will complete 1-2 handed static standing act for 2 mins with supervision  Short term goal 6: pt will complete HEP x 5 occasions within range of pacemaker precautions to improve strength and endurance for ADLs  Long term goals  Time Frame for Long term goals : 2 weeks  Long term goal 1: pt will complete overall dressing with modified independence  Long term goal 2: pt will complete overall toileting with modified independence  Long term goal 3: pt will complete overall bathing with modified independence  Long term goal 4: pt will complete simple ambulatory home making task with modified independence  Long term goal 5: pt will complete HEP with independence  Long term goals 6: pt verbalize DME for home       Therapy Time   Individual Concurrent Group Co-treatment   Time In 1345         Time Out 1430         Minutes 45         Timed Code Treatment Minutes: [address], OT

## 2021-03-19 NOTE — PROGRESS NOTES
Occupational Therapy  Facility/Department: [facility] 8 REHAB UNIT  Daily Treatment Note  NAME: Nola Luna  : 1940  MRN: [id number]    Date of Service: 3/19/2021    Discharge Recommendations:  Home with Home health OT       Assessment   Performance deficits / Impairments: Decreased functional mobility ; Decreased endurance;Decreased strength;Decreased ROM; Decreased balance;Decreased ADL status; Decreased high-level IADLs  Assessment: Requires frequent rests breaks due to SOB and fatigue, but able to complete therapy session. O2 sats 92% on 2 L post shower ambulation from bathroom. Treatment Diagnosis: Pneumonia, recent pacemaker (3-13-21)  OT Education: ADL Adaptive Strategies;Transfer Training  Activity Tolerance  Activity Tolerance: Patient Tolerated treatment well  Activity Tolerance: Requires frequent rests breaks due to SOB and fatigue, but able to complete therapy session. Safety Devices  Safety Devices in place: Yes(Left with PT)  Type of devices: Left in chair         Patient Diagnosis(es): There were no encounter diagnoses. has a past medical history of Acute liver failure without hepatic coma, Back pain, Bladder cancer (HCC), Blood circulation, collateral, Carotid arterial disease ([address] CKD (chronic kidney disease), stage II, COPD with acute lower respiratory infection ([address] GERD (gastroesophageal reflux disease), Hyperlipidemia, Hypertension, Hypertension, Palliative care patient, Pneumonia due to infectious organism, Primary osteoarthritis of left knee, PVD (peripheral vascular disease) ([address] Tremor, and Type 2 diabetes mellitus with complication, without long-term current use of insulin ([address] has a past surgical history that includes back surgery;  Tonsillectomy and adenoidectomy; Colonoscopy (?); pr colonoscopy flx dx w/collj spec when pfrmd (N/A, 2017); pr revise median n/carpal tunnel surg (Left, 2018); pr thromboendartectmy neck,neck incis (Left, 2018); vascular surgery (04/21/2015); vascular surgery (01/13/2015); vascular surgery (03/11/2014); vascular surgery (01/18/2013); pr total knee arthroplasty (Left, 10/15/2018); vascular surgery (10/30/2018); vascular surgery (12/17/2018); and Cystoscopy (Bilateral, 12/19/2018).     Restrictions  Restrictions/Precautions  Restrictions/Precautions: Surgical Protocols, Fall Risk  Implants present? : Pacemaker  Position Activity Restriction  Other position/activity restrictions: PACEMAKER RESTRICTIONS TO LUE   Subjective   General  Chart Reviewed: Yes, Orders  Patient assessed for rehabilitation services?: Yes  Additional Pertinent Hx: Chronic low back pain, ETOH use, bladder cancer, home O2 prn  Family / Caregiver Present: No  Diagnosis: Respiratory failure with hypoxia, recen pacemaker placement--3/13  Vital Signs  Pulse: 109(Post shower and ambulation)  Oxygen Therapy  SpO2: 92 %(Post shower and ambulation from bathroom, very SOB)  Pulse Oximeter Device Mode: Intermittent  Pulse Oximeter Device Location: Finger;Right  O2 Device: Nasal cannula  O2 Flow Rate (L/min): 2 L/min   Objective    Balance  Sitting Balance: Supervision  Standing Balance: Contact guard assistance  Standing Balance  Time: ~2 minutes x2 occasions post shower  Functional Mobility  Functional - Mobility Device: Rolling Walker  Activity: To/from bathroom  Assist Level: Contact guard assistance              Plan   Plan  Specific instructions for Next Treatment: AE, endurance training  Current Treatment Recommendations: Strengthening, Patient/Caregiver Education & Training, Home Management Training, Equipment Evaluation, Education, & procurement, Balance Training, Self-Care / ADL, Safety Education & Training, Endurance Training, Functional Mobility Training       OutComes Score       03/19/21 1000   Shower/Bathe Self   Assistance Needed Partial/moderate assistance   Comment Shower Mod A   CARE Score 3   Lower Body Dressing   Assistance Needed Partial/moderate

## 2021-03-19 NOTE — CONSULTS
drinks 2 glasses of wine daily. History  Past Medical History:   Diagnosis Date    Acute liver failure without hepatic coma 10/23/2018    Back pain     \"with tired legs as a result\"    Bladder cancer ([facility] [address] 12/19/2018    Blood circulation, collateral     Carotid arterial disease (HCC)     recent surgery    CKD (chronic kidney disease), 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/2018    COPD with acute lower respiratory infection ([address] 2/24/2021    GERD (gastroesophageal reflux disease)     Hyperlipidemia     Hypertension     Hypertension     Palliative care patient 10/23/2018    Pneumonia due to infectious organism 11/06/2018    Primary osteoarthritis of left knee 10/14/2018    PVD (peripheral vascular disease) (HCC)     Tremor     Tremor on Right side x 1-2 weeks per stepdaughter    Type 2 diabetes mellitus with complication, without long-term current use of insulin ([facility] [address] 1/21/2021     Past Surgical History:   Procedure Laterality Date    BACK SURGERY      COLONOSCOPY  2007?  CYSTOSCOPY Bilateral 12/19/2018    CYSTOSCOPY, BIOSPY FULGURATION OF BLADDER TUMOR POSSIBLE TURBT, RETROGRADE PYELOGRAM performed by Dmitry Elkins MD at [facility] COLONOSCOPY FLX DX W/COLLJ SPEC WHEN PFRMD N/A 9/11/2017    Dr Lena Ford internal hemorrhoids, diverticular disease-HP-No recall (age)   Claudia Pheasant [place] REVISE MEDIAN N/CARPAL TUNNEL SURG Left 7/18/2018    OPEN CARPAL TUNNEL RELEASE performed by Eugenio Thomas MD at [address] Left 8/28/2018    LEFT CAROTID ENDARTERECTOMY WITH VEIN PATCH ANGIOPLASTY AND COMPLETION ANGIOGRAM performed by Romeo Watkins MD at [address] 10/15/2018    LEFT COMPLEX TOTAL KNEE ARTHROPLASTY performed by Eugenio Thomas MD at [facility] VASCULAR SURGERY  04/21/2015    Munir BESS Ultrasound guided access of left common femoral artery. Aortogram.Diagnostic right lower extremity arteriogram.Radiologic supervision and interpretation.  VASCULAR SURGERY  2015    Terell Villanueva M.D Atherectomy,angioplasty,and stenting of left superficial femoral artery.  VASCULAR SURGERY  2014    Terell Villanueva M.D. Ultrasound-guided access of right common femoral artery. Aortogram.Left lower extremity arteriogram.Atherectomy and angioplasty of left superficial femoral artery. Radiologic supervision and interpretation.  VASCULAR SURGERY  2013    Terell BESS Aortogram.Multistation arteriogram right lower extremity. Laser atherectomy and angioplasty of right superficial femoral artery. Selective catheterization of right tibioperoneal trunk. Angioplasty of peroneal artery and tibioperoneal trunk.  VASCULAR SURGERY  10/30/2018    SJS. Ultrasound guided cannulation of right internal vein. Placement of right internal jugular vein tunneled dialysis catheter bard equistream xk 23cm tip to cuff    VASCULAR SURGERY  2018    SJS. Removal of tunneled dilaysis catheter right internal jugular vein. Family History   Problem Relation Age of Onset    Colon Cancer Father     Diabetes Brother     Colon Polyps Neg Hx     Liver Cancer Neg Hx     Liver Disease Neg Hx     Esophageal Cancer Neg Hx     Rectal Cancer Neg Hx     Stomach Cancer Neg Hx       Social History     Tobacco Use    Smoking status: Former Smoker     Quit date: 6/3/2003     Years since quittin.8    Smokeless tobacco: Never Used   Substance Use Topics    Alcohol use:  Yes     Alcohol/week: 12.0 standard drinks     Types: 12 Glasses of wine per week     Comment: 2 glasses of wine every night    Drug use: No     Medications Prior to Admission: calcium carbonate (TUMS) 500 MG chewable tablet, Take 1 tablet by mouth 3 times daily as needed for Heartburn  guaiFENesin (ROBITUSSIN) 100 MG/5ML syrup, Take 10 mLs by mouth every 4 hours as needed for Cough  famotidine (PEPCID) 20 MG tablet, Take 1 tablet by mouth daily  aspirin 81 MG EC tablet, Take 1 tablet by mouth daily  glipiZIDE (GLUCOTROL) 5 MG tablet, Take 1 tablet by mouth every morning (before breakfast)  atorvastatin (LIPITOR) 20 MG tablet, Take 1 tablet by mouth daily  metoprolol succinate (TOPROL XL) 100 MG extended release tablet, Take 1 tablet by mouth daily  NIFEdipine (ADALAT CC) 60 MG extended release tablet, Take 1 tablet by mouth daily  tamsulosin (FLOMAX) 0.4 MG capsule, Take 1 capsule by mouth daily  pantoprazole (PROTONIX) 40 MG tablet, Take 1 tablet by mouth daily  trospium (SANCTURA) 20 MG tablet, Take 1 tablet by mouth nightly   Scheduled Meds:     linaclotide  145 mcg Oral QAM AC    sodium chloride flush  10 mL Intravenous BID    amiodarone  200 mg Oral BID    aspirin  81 mg Oral Daily    atorvastatin  20 mg Oral Daily    ferrous sulfate  325 mg Oral BID WC    folic acid  1 mg Oral Daily    insulin lispro  0-6 Units Subcutaneous TID WC    insulin lispro  0-3 Units Subcutaneous Nightly    metoprolol succinate  100 mg Oral Daily    miconazole   Topical 4x Daily    NIFEdipine  60 mg Oral Daily    pantoprazole  40 mg Oral Daily    predniSONE  5 mg Oral Daily    tamsulosin  0.4 mg Oral BID    [START ON 3/22/2021] vitamin D  50,000 Units Oral Weekly     PRN Meds:  ondansetron, sodium chloride flush, [DISCONTINUED] acetaminophen **OR** acetaminophen, dextrose, dextrose, glucagon (rDNA), glucose, bisacodyl, calcium carbonate, guaiFENesin, ipratropium-albuterol, acetaminophen, polyethylene glycol   Allergies:  Eliquis [apixaban] and Promethazine hcl    Subjective   Review of Systems   Constitutional: Positive for fatigue. Negative for fever. HENT: Negative for mouth sores and nosebleeds. Eyes: Negative for discharge and itching. Respiratory: Positive for cough, shortness of breath and wheezing. Cardiovascular: Negative for chest pain and palpitations.    Gastrointestinal: Negative for abdominal pain, blood in stool, constipation, diarrhea, nausea and vomiting. Genitourinary: Negative for dysuria and hematuria. Musculoskeletal: Positive for arthralgias. Negative for joint swelling and myalgias. Skin: Negative for pallor and rash. Allergic/Immunologic: Negative for environmental allergies and immunocompromised state. Neurological: Positive for weakness. Negative for seizures, syncope and numbness. Hematological: Negative for adenopathy. Does not bruise/bleed easily. Psychiatric/Behavioral: Negative for agitation, behavioral problems and confusion. Objective   Physical Exam  Vitals signs reviewed. Constitutional:       Appearance: He is well-developed. He is not toxic-appearing or diaphoretic. Comments: Appears chronically ill. Up to the bathroom, showering. Overweight. HENT:      Head: Normocephalic and atraumatic. Right Ear: External ear normal.      Left Ear: External ear normal.      Nose: Nose normal.      Mouth/Throat:      Mouth: Mucous membranes are moist.   Eyes:      General: No scleral icterus. Right eye: No discharge. Left eye: No discharge. Conjunctiva/sclera: Conjunctivae normal.   Neck:      Musculoskeletal: Neck supple. No muscular tenderness. Trachea: No tracheal deviation. Cardiovascular:      Rate and Rhythm: Normal rate and regular rhythm. Comments: Pacemaker   Pulmonary:      Effort: Pulmonary effort is normal. No respiratory distress. Breath sounds: Wheezing and rales present. Abdominal:      General: Bowel sounds are normal. There is no distension. Palpations: Abdomen is soft. Tenderness: There is no abdominal tenderness. There is no guarding. Genitourinary:     Comments: Exam deferred  Musculoskeletal:         General: No tenderness or deformity. Comments: Generalized weakness   Lymphadenopathy:      Cervical:      Right cervical: No superficial or deep cervical adenopathy.      Left cervical: No superficial or deep cervical 03/06/21  1230   BC No growth after 5 days of incubation. Gram Stain Recent: No results for input(s): LABGRAM in the last 720 hours. Resp Culture Recent: No results for input(s): CULTRESP in the last 720 hours. Body Fluid Recent : No results for input(s): BFCX in the last 720 hours. MRSA Recent :   Recent Labs     03/09/21  1135   [facility] No MRSA detected on culture      Urine Culture Recent : No results for input(s): LABURIN in the last 720 hours. Organism Recent : No results for input(s): ORG in the last 720 hours. ASSESSMENT/PLAN:    #1  Chronic thrombocytopenia  Platelets 31,915, previously 115,000 upon rehab admit 3/16/2021 and 131,000 on inpatient admit 3/6/2021    We will ask pharmacy to review medications for admission 3/6/2021 and current admission for possible contributors to thrombocytopenia. Request ultrasound to evaluate for hypersplenism. Additional serology requested: Copper, zinc, RELL, antiplatelet antibody, hepatitis/HIV, SPEP, QI, light chains, IPF    #2   multifactorial anemia, iron/B12/folate deficiency, anemia of chronic disease, possibly CKD associated, multiple lab draws  Hgb 7.0/MCV 98.7 (status post 1 unit PRBC 3/12/2021)    Status post Venofer 100 mg daily x3, completed 3/19/2021  Continue oral iron  Continue folate replacement  Last colonoscopy 9/11/2017 documented a hyperplastic sigmoid colon polypectomy  Labs as above, EPO    #3  Generalized weakness  PT/OT ongoing    #4   Recent A.  Fib with RVR/pacemaker  As per cardiology    #5  History of bladder cancer  Followed by Dr. Ezzie Gottron   UA 3/17/2021 trace blood          [facility], APRN  03/19/21  1:54 PM

## 2021-03-19 NOTE — TELEPHONE ENCOUNTER
[address] [facility] REHAB CALLED ON PT FOR DR HOGAN. ROOM 816,DX: WORSENING THROMBOCYTOPENIA,PHONE [phone].

## 2021-03-19 NOTE — PROGRESS NOTES
Patient:   Cr Quinones  MR#:    [id number]   Room:    [facility]   YOB: 1940  Date of Progress Note: 3/19/2021  Time of Note                           8:52 AM  Consulting Physician:   Jody Silvestre M.D. Attending Physician:  Jody Silvestre MD     Chief complaint Respiratory failure     S:This 79 y.o. male  with a past medical history of bladder cancer, HTN, CKD stage III, PVD, hyperlipidemia,CAD,COPD,CHF has oxygen at home but doesn't wear if often,ETOH use drinks 2 glasses of wine every night and former smoker. He presented to [facility] ER on 3/6/21 with shortness of breath. CXR done showed increased pulmonary vascular congestion and CT of chest showed bilateral pleural effusion. He was admitted to the hospitalist service with Acute on chronic hypoxemic and hypercapnic respiratory, COPD exacerbation and possible recurrent pneumonia. Pulmonology was consulted. He was seen by Dr. Armando Vanessa, who didn't feel he had pneumoinia st this time, but did recommend Trilogy at discharge d/t patient not tolerating Bi-PAP for questionable sleep apnea. Patient had an episode of A-Fib with RVR. Cardiology was consulted. He was seen by Dr. Tyree Addison. He underwent cardiac catheterization by Dr. Stephenie Cole on 3/10/21 revealing 100% occlusion right coronary artery which appears chronic in nature well collateralized from the left no significant disease on the left. Dr. Stephenie Cole recommended placement of a dual-chamber pacemaker. Patient was in agreement and went or surgery on 3/13/21. He tolerated the procedure well. SPT was consulted to evaluate swallow. He was noted to have oropharyngeal phase dysphagia but no s/s of aspiration, he was placed on a regular diet with thin liquids. He is participating in both PT/OT. He is felt to need a stay on Rehab to work towards his goal of returning home with his wife. He is now felt ready to start the Rehab program. No new complaints.     REVIEW OF SYSTEMS:  Constitutional: No fevers No chills  Neck:No stiffness  Respiratory: some shortness of breath  Cardiovascular: No chest pain No palpitations  Gastrointestinal: No abdominal pain    Genitourinary: No Dysuria  Neurological: No headache, no confusion    Past Medical History:      Diagnosis Date    Acute liver failure without hepatic coma 10/23/2018    Back pain     \"with tired legs as a result\"    Bladder cancer ([address] 12/19/2018    Blood circulation, collateral     Carotid arterial disease ([address]     recent surgery    CKD (chronic kidney disease), 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/2018    COPD with acute lower respiratory infection ([address] 2/24/2021    GERD (gastroesophageal reflux disease)     Hyperlipidemia     Hypertension     Hypertension     Palliative care patient 10/23/2018    Pneumonia due to infectious organism 11/06/2018    Primary osteoarthritis of left knee 10/14/2018    PVD (peripheral vascular disease) (HCC)     Tremor     Tremor on Right side x 1-2 weeks per stepdaughter    Type 2 diabetes mellitus with complication, without long-term current use of insulin ([address] 1/21/2021       Past Surgical History:      Procedure Laterality Date    BACK SURGERY      COLONOSCOPY  2007?     CYSTOSCOPY Bilateral 12/19/2018    CYSTOSCOPY, BIOSPY FULGURATION OF BLADDER TUMOR POSSIBLE TURBT, RETROGRADE PYELOGRAM performed by Conrado Rob MD at [address] COLONOSCOPY FLX DX W/COLLJ SPEC WHEN PFRMD N/A 9/11/2017    Dr Leary Jobs internal hemorrhoids, diverticular disease-HP-No recall (age)   Juan Morgan [place] REVISE MEDIAN N/CARPAL TUNNEL SURG Left 7/18/2018    OPEN CARPAL TUNNEL RELEASE performed by Mauricio Tavarez MD at [address] Left 8/28/2018    LEFT CAROTID ENDARTERECTOMY WITH VEIN PATCH ANGIOPLASTY AND COMPLETION ANGIOGRAM performed by Dakota Morillo MD at [address] Left 10/15/2018    LEFT COMPLEX TOTAL KNEE ARTHROPLASTY performed by Mauricio Tavarez MD at [address] [facility] TONSILLECTOMY AND ADENOIDECTOMY      VASCULAR SURGERY  04/21/2015    Merary BESS Ultrasound guided access of left common femoral artery. Aortogram.Diagnostic right lower extremity arteriogram.Radiologic supervision and interpretation.  VASCULAR SURGERY  01/13/2015    Merary Gore M.D Atherectomy,angioplasty,and stenting of left superficial femoral artery.  VASCULAR SURGERY  03/11/2014    Merary Gore M.D. Ultrasound-guided access of right common femoral artery. Aortogram.Left lower extremity arteriogram.Atherectomy and angioplasty of left superficial femoral artery. Radiologic supervision and interpretation.  VASCULAR SURGERY  01/18/2013    Merary BESS Aortogram.Multistation arteriogram right lower extremity. Laser atherectomy and angioplasty of right superficial femoral artery. Selective catheterization of right tibioperoneal trunk. Angioplasty of peroneal artery and tibioperoneal trunk.  VASCULAR SURGERY  10/30/2018    SJS. Ultrasound guided cannulation of right internal vein. Placement of right internal jugular vein tunneled dialysis catheter bard equistream xk 23cm tip to cuff    VASCULAR SURGERY  12/17/2018    SJS. Removal of tunneled dilaysis catheter right internal jugular vein.        Medications in Hospital:      Current Facility-Administered Medications:     linaclotide (LINZESS) capsule 145 mcg, 145 mcg, Oral, QAM AC, Boby Monroe MD, 145 mcg at 03/19/21 0620    sodium chloride flush 0.9 % injection 10 mL, 10 mL, Intravenous, BID, Boby Monroe MD, 10 mL at 03/18/21 2012    ondansetron (ZOFRAN) injection 4 mg, 4 mg, Intravenous, Q6H PRN, Los Oliva MD    sodium chloride flush 0.9 % injection 10 mL, 10 mL, Intravenous, PRN, Los Oliva MD    [DISCONTINUED] acetaminophen (TYLENOL) tablet 650 mg, 650 mg, Oral, Q6H PRN **OR** acetaminophen (TYLENOL) suppository 650 mg, 650 mg, Rectal, Q6H PRN, Los Oliva MD    dextrose 5 % solution, 100 mL/hr, Intravenous, PRN, Va Arroyo MD    dextrose 50 % IV solution, 12.5 g, Intravenous, PRN, Va Arroyo MD    glucagon (rDNA) injection 1 mg, 1 mg, Intramuscular, PRN, Va Arroyo MD    glucose (GLUTOSE) 40 % oral gel 15 g, 15 g, Oral, PRN, Va Arroyo MD    amiodarone (CORDARONE) tablet 200 mg, 200 mg, Oral, BID, Va Arroyo MD, 200 mg at 03/19/21 0749    aspirin EC tablet 81 mg, 81 mg, Oral, Daily, Va Arroyo MD, 81 mg at 03/19/21 0749    atorvastatin (LIPITOR) tablet 20 mg, 20 mg, Oral, Daily, Va Arroyo MD, 20 mg at 03/19/21 0749    bisacodyl (DULCOLAX) EC tablet 5 mg, 5 mg, Oral, Daily PRN, Va Arroyo MD    calcium carbonate (TUMS) chewable tablet 500 mg, 500 mg, Oral, TID PRN, Va Arroyo MD    ferrous sulfate (IRON 325) tablet 325 mg, 325 mg, Oral, BID , Va Arroyo MD, 325 mg at 79/19/66 8362    folic acid (FOLVITE) tablet 1 mg, 1 mg, Oral, Daily, Va Arroyo MD, 1 mg at 03/19/21 0749    guaiFENesin (ROBITUSSIN) 100 MG/5ML liquid 200 mg, 200 mg, Oral, Q4H PRN, Va Arroyo MD    insulin lispro (HUMALOG) injection vial 0-6 Units, 0-6 Units, Subcutaneous, TID , Va Arroyo MD, 1 Units at 03/19/21 0748    insulin lispro (HUMALOG) injection vial 0-3 Units, 0-3 Units, Subcutaneous, Nightly, Va Arroyo MD, 1 Units at 03/17/21 2105    ipratropium-albuterol (DUONEB) nebulizer solution 1 ampule, 1 ampule, Inhalation, Q4H PRN, Va Arroyo MD    metoprolol succinate (TOPROL XL) extended release tablet 100 mg, 100 mg, Oral, Daily, Va Arroyo MD, 100 mg at 03/19/21 0749    miconazole (MICOTIN) 2 % powder, , Topical, 4x Daily, Va Arroyo MD, Given at 03/18/21 2016    NIFEdipine (PROCARDIA XL) extended release tablet 60 mg, 60 mg, Oral, Daily, Va Arroyo MD, 60 mg at 03/19/21 0723   pantoprazole (PROTONIX) tablet 40 mg, 40 mg, Oral, Daily, Viri Rubalcava MD, 40 mg at 03/19/21 0749    [COMPLETED] predniSONE (DELTASONE) tablet 10 mg, 10 mg, Oral, Daily, 10 mg at 03/17/21 0907 **FOLLOWED BY** predniSONE (DELTASONE) tablet 5 mg, 5 mg, Oral, Daily, Viri Rubalcava MD, 5 mg at 03/19/21 0748    tamsulosin (FLOMAX) capsule 0.4 mg, 0.4 mg, Oral, BID, Viri Rubalcava MD, 0.4 mg at 03/19/21 0748    [START ON 3/22/2021] vitamin D (ERGOCALCIFEROL) capsule 50,000 Units, 50,000 Units, Oral, Weekly, Viri Rubalcava MD    acetaminophen (TYLENOL) tablet 650 mg, 650 mg, Oral, Q4H PRN, Nasra Anthony MD    polyethylene glycol (GLYCOLAX) packet 17 g, 17 g, Oral, Daily PRN, Nasra Anthony MD    Allergies:  Eliquis [apixaban] and Promethazine hcl    Social History:   TOBACCO:   reports that he quit smoking about 17 years ago. He has never used smokeless tobacco.  ETOH:   reports current alcohol use of about 12.0 standard drinks of alcohol per week. Family History:       Problem Relation Age of Onset    Colon Cancer Father     Diabetes Brother     Colon Polyps Neg Hx     Liver Cancer Neg Hx     Liver Disease Neg Hx     Esophageal Cancer Neg Hx     Rectal Cancer Neg Hx     Stomach Cancer Neg Hx          PHYSICAL EXAM:  /60   Pulse 69   Temp 97.2 °F (36.2 °C) (Temporal)   Resp 16   Ht 6' (1.829 m)   Wt 250 lb (113.4 kg)   SpO2 93%   BMI 33.91 kg/m²     Constitutional - well developed, well nourished. Eyes - conjunctiva normal.   Ear, nose, throat - No scars, masses, or lesions over external nose or ears, no atrophy of tongue  Neck-symmetric, no masses noted, no jugular vein distension  Respiration- chest wall appears symmetric, good expansion,   normal effort without use of accessory muscles  Musculoskeletal - no significant wasting of muscles noted, no bony deformities  Extremities-no clubbing, cyanosis or edema  Skin - warm, dry, and intact.  No rash, erythema, or pallor. Psychiatric - mood, affect, and behavior appear normal.      Neurological exam  Awake, alert, fluent oriented appropriate affect  Attention and concentration appear appropriate  Recent and remote memory appears unremarkable  Speech normal without dysarthria  No clear issues with language of fund of knowledge     Cranial Nerve Exam     CN III, IV,VI-EOMI, No nystagmus, conjugate eye movements, no ptosis    CN VII-no facial assymetry       Motor Exam  antigravity throughout upper and lower extremities bilaterally      Tremors- no tremors in hands or head noted     Gait  Not tested        Nursing/pcp notes, imaging,labs and vitals reviewed. PT,OT and/or speech notes reviewed    Lab Results   Component Value Date    WBC 8.2 03/19/2021    HGB 7.0 (L) 03/19/2021    HCT 23.4 (L) 03/19/2021    MCV 98.7 (H) 03/19/2021    PLT 88 (L) 03/19/2021     Lab Results   Component Value Date     03/19/2021    K 4.8 03/19/2021     03/19/2021    CO2 28 03/19/2021    BUN 47 (H) 03/19/2021    CREATININE 1.9 (H) 03/19/2021    GLUCOSE 137 (H) 03/19/2021    CALCIUM 8.3 (L) 03/19/2021    PROT 4.6 (L) 03/19/2021    LABALBU 3.1 (L) 03/19/2021    BILITOT 0.6 03/19/2021    ALKPHOS 81 03/19/2021    AST 13 03/19/2021    ALT 14 03/19/2021    LABGLOM 34 (A) 03/19/2021    GFRAA 42 (L) 03/19/2021     Lab Results   Component Value Date    INR 1.11 03/15/2021    INR 1.13 03/06/2021    INR 1.16 01/21/2021    PROTIME 14.3 03/15/2021    PROTIME 14.4 03/06/2021    PROTIME 14.8 (H) 01/21/2021     Michael Huerta PTA   Therapy Assistant   Physical Therapy   Progress Notes   Signed   Date of Service:  3/18/2021  1:21 PM               Signed             Show:Clear all  []Manual[x]Template[]Copied    Added by:  [x]Bipin Zepeda PTA    []Hover for details  Yury Patrick  [id number]       03/18/21 1317 03/18/21 1318   Subjective   Subjective Pt agreed to therapy this afternoon.   --    Transfers   Sit to Stand Contact guard assistance  --    Stand to sit Contact guard assistance  --    Ambulation   Ambulation? Yes  --    Ambulation 1   Surface level tile  --    Device Rolling Walker  --    Other Apparatus O2;Wheelchair follow  (2L)  --    Assistance Contact guard assistance  --    Quality of Gait Pt tolerated increase in amb distance better this afternoon, but still showing forward flex posture. --    Distance 36'  --    Exercises   Comments  --  Sitting [place] LE ther ex x 20 reps. Conditions Requiring Skilled Therapeutic Intervention   Body structures, Functions, Activity limitations  --  Decreased functional mobility ; Decreased ADL status; Decreased endurance;Decreased posture   Assessment  --  Pt was feeling better this afternoon and tolerated increase in amb distance well. Pt tolerated sitting ther ex well. Prognosis  --  Good   Activity Tolerance   Activity Tolerance  --  Patient limited by fatigue;Patient limited by endurance   Electronically signed by Kirsten Penaloza PTA on 3/18/2021 at 1:22 PM               Cosigned by: Taylor Rivers PT at 3/18/2021  1:24 PM   Revision History                          RECORD REVIEW: Previous medical records, medications were reviewed at today's visit    IMPRESSION:   1. Respiratory failure. CHF/COPD-O2/Duonebs PRN/tapering prednisone/Trilogy  2. Atrial fibrillation/S/P PPM-Amiodarone/Procardia XL/metorprolol  3. CAD/PVD-ASA/statin  4. Hyperlipidemia-on statin  5. Anemia-on iron  6. HTN-on meds monitor  7. GI-bowel regimen/PPI  8. BPH/history of bladder cancer-on meds  9. Vitamin D deficiency-on Vitamin D  10. DVT prophylaxis-SCDs  11. ETOH use-folic acid  12. Chronic kidney disease-monitor   13. Thrombocytopenia-monito  14. Back pain/arthritis-Tylenol  15.  PT/OT/Speech    Vbznhidalfrvrqxm-pzpyyttzy-gcjn get hematology consult 3/19     Continue current care as noted      Expected duration and frequency therapy: 180 minutes per day, 5 days per week    CALL WITH ANY QUESTIONS  [phone] CELL  Dr Jeff Bauer

## 2021-03-19 NOTE — PROGRESS NOTES
Vascular Preliminary Report      Left Upper Extremity Venous Duplex Completed. No evidence of DVT, SVT, or Reflux noted at this time. Left Subclavian Pacemaker wire noted. Edema noted in the left arm. Final Report Pending.

## 2021-03-20 ENCOUNTER — APPOINTMENT (OUTPATIENT)
Dept: ULTRASOUND IMAGING | Age: 81
DRG: 189 | End: 2021-03-20
Attending: PSYCHIATRY & NEUROLOGY
Payer: MEDICARE

## 2021-03-20 LAB
ABO/RH: NORMAL
ANTIBODY SCREEN: NORMAL
BLOOD BANK DISPENSE STATUS: NORMAL
BLOOD BANK PRODUCT CODE: NORMAL
BPU ID: NORMAL
DESCRIPTION BLOOD BANK: NORMAL
GLUCOSE BLD-MCNC: 122 MG/DL (ref 70–99)
GLUCOSE BLD-MCNC: 198 MG/DL (ref 70–99)
GLUCOSE BLD-MCNC: 213 MG/DL (ref 70–99)
GLUCOSE BLD-MCNC: 240 MG/DL (ref 70–99)
HCT VFR BLD CALC: 22.3 % (ref 42–52)
HCT VFR BLD CALC: 25.6 % (ref 42–52)
HEMOGLOBIN: 6.7 G/DL (ref 14–18)
HEMOGLOBIN: 8.1 G/DL (ref 14–18)
MCH RBC QN AUTO: 29.3 PG (ref 27–31)
MCHC RBC AUTO-ENTMCNC: 30 G/DL (ref 33–37)
MCV RBC AUTO: 97.4 FL (ref 80–94)
PDW BLD-RTO: 17.7 % (ref 11.5–14.5)
PERFORMED ON: ABNORMAL
PLATELET # BLD: 115 K/UL (ref 130–400)
PMV BLD AUTO: 13 FL (ref 9.4–12.4)
RBC # BLD: 2.29 M/UL (ref 4.7–6.1)
WBC # BLD: 7.1 K/UL (ref 4.8–10.8)

## 2021-03-20 PROCEDURE — 36430 TRANSFUSION BLD/BLD COMPNT: CPT

## 2021-03-20 PROCEDURE — P9016 RBC LEUKOCYTES REDUCED: HCPCS

## 2021-03-20 PROCEDURE — 2580000003 HC RX 258: Performed by: NURSE PRACTITIONER

## 2021-03-20 PROCEDURE — 86901 BLOOD TYPING SEROLOGIC RH(D): CPT

## 2021-03-20 PROCEDURE — 85027 COMPLETE CBC AUTOMATED: CPT

## 2021-03-20 PROCEDURE — 85018 HEMOGLOBIN: CPT

## 2021-03-20 PROCEDURE — 82947 ASSAY GLUCOSE BLOOD QUANT: CPT

## 2021-03-20 PROCEDURE — 85014 HEMATOCRIT: CPT

## 2021-03-20 PROCEDURE — 36415 COLL VENOUS BLD VENIPUNCTURE: CPT

## 2021-03-20 PROCEDURE — 1180000000 HC REHAB R&B

## 2021-03-20 PROCEDURE — 99232 SBSQ HOSP IP/OBS MODERATE 35: CPT | Performed by: PSYCHIATRY & NEUROLOGY

## 2021-03-20 PROCEDURE — 76705 ECHO EXAM OF ABDOMEN: CPT

## 2021-03-20 PROCEDURE — 99233 SBSQ HOSP IP/OBS HIGH 50: CPT | Performed by: INTERNAL MEDICINE

## 2021-03-20 PROCEDURE — 6360000002 HC RX W HCPCS: Performed by: NURSE PRACTITIONER

## 2021-03-20 PROCEDURE — 86923 COMPATIBILITY TEST ELECTRIC: CPT

## 2021-03-20 PROCEDURE — 86900 BLOOD TYPING SEROLOGIC ABO: CPT

## 2021-03-20 PROCEDURE — 97110 THERAPEUTIC EXERCISES: CPT

## 2021-03-20 PROCEDURE — 6370000000 HC RX 637 (ALT 250 FOR IP): Performed by: HOSPITALIST

## 2021-03-20 PROCEDURE — 86850 RBC ANTIBODY SCREEN: CPT

## 2021-03-20 PROCEDURE — 2700000000 HC OXYGEN THERAPY PER DAY

## 2021-03-20 PROCEDURE — 2580000003 HC RX 258: Performed by: PSYCHIATRY & NEUROLOGY

## 2021-03-20 RX ORDER — SODIUM CHLORIDE 9 MG/ML
INJECTION, SOLUTION INTRAVENOUS PRN
Status: DISCONTINUED | OUTPATIENT
Start: 2021-03-20 | End: 2021-03-29 | Stop reason: HOSPADM

## 2021-03-20 RX ADMIN — ASPIRIN 81 MG: 81 TABLET, FILM COATED ORAL at 07:46

## 2021-03-20 RX ADMIN — METOPROLOL SUCCINATE 100 MG: 50 TABLET, EXTENDED RELEASE ORAL at 07:46

## 2021-03-20 RX ADMIN — TAMSULOSIN HYDROCHLORIDE 0.4 MG: 0.4 CAPSULE ORAL at 20:28

## 2021-03-20 RX ADMIN — AMIODARONE HYDROCHLORIDE 200 MG: 200 TABLET ORAL at 20:29

## 2021-03-20 RX ADMIN — PREDNISONE 5 MG: 5 TABLET ORAL at 07:47

## 2021-03-20 RX ADMIN — AMIODARONE HYDROCHLORIDE 200 MG: 200 TABLET ORAL at 07:46

## 2021-03-20 RX ADMIN — ATORVASTATIN CALCIUM 20 MG: 20 TABLET, FILM COATED ORAL at 07:46

## 2021-03-20 RX ADMIN — MICONAZOLE NITRATE: 2 POWDER TOPICAL at 07:50

## 2021-03-20 RX ADMIN — FERROUS SULFATE TAB 325 MG (65 MG ELEMENTAL FE) 325 MG: 325 (65 FE) TAB at 17:15

## 2021-03-20 RX ADMIN — TAMSULOSIN HYDROCHLORIDE 0.4 MG: 0.4 CAPSULE ORAL at 07:46

## 2021-03-20 RX ADMIN — NIFEDIPINE 60 MG: 60 TABLET, EXTENDED RELEASE ORAL at 07:46

## 2021-03-20 RX ADMIN — MICONAZOLE NITRATE: 2 POWDER TOPICAL at 20:32

## 2021-03-20 RX ADMIN — FERROUS SULFATE TAB 325 MG (65 MG ELEMENTAL FE) 325 MG: 325 (65 FE) TAB at 07:47

## 2021-03-20 RX ADMIN — INSULIN LISPRO 2 UNITS: 100 INJECTION, SOLUTION INTRAVENOUS; SUBCUTANEOUS at 17:15

## 2021-03-20 RX ADMIN — PANTOPRAZOLE SODIUM 40 MG: 40 TABLET, DELAYED RELEASE ORAL at 07:47

## 2021-03-20 RX ADMIN — SODIUM CHLORIDE, PRESERVATIVE FREE 10 ML: 5 INJECTION INTRAVENOUS at 20:32

## 2021-03-20 RX ADMIN — INSULIN LISPRO 2 UNITS: 100 INJECTION, SOLUTION INTRAVENOUS; SUBCUTANEOUS at 11:53

## 2021-03-20 RX ADMIN — FOLIC ACID 1 MG: 1 TABLET ORAL at 07:47

## 2021-03-20 RX ADMIN — SODIUM CHLORIDE, PRESERVATIVE FREE 10 ML: 5 INJECTION INTRAVENOUS at 07:50

## 2021-03-20 RX ADMIN — IRON SUCROSE 400 MG: 20 INJECTION, SOLUTION INTRAVENOUS at 20:28

## 2021-03-20 ASSESSMENT — ENCOUNTER SYMPTOMS
VOMITING: 0
ABDOMINAL PAIN: 0
SHORTNESS OF BREATH: 0
EYE DISCHARGE: 0
NAUSEA: 0
CONSTIPATION: 0
EYE ITCHING: 0
COUGH: 0
SORE THROAT: 0
WHEEZING: 0
TROUBLE SWALLOWING: 0
DIARRHEA: 0

## 2021-03-20 NOTE — PROGRESS NOTES
03/20/21 3170 03/20/21 5090 03/20/21 0839   Pain Screening   Patient Currently in Pain No  --   --    Oxygen Therapy   O2 Device Nasal cannula  --   --    O2 Flow Rate (L/min) 2 L/min  --   --    Exercises   Comments  --  Supine BLE ex  x20 reps performed at bedside. --    Conditions Requiring Skilled Therapeutic Intervention   Assessment  --   --   --    Activity Tolerance   Activity Tolerance  --   --   --    Safety Devices   Type of devices  --   --  Bed alarm in place;Call light within reach      03/20/21 0845   Pain Screening   Patient Currently in Pain  --    Oxygen Therapy   O2 Device  --    O2 Flow Rate (L/min)  --    Exercises   Comments  --    Conditions Requiring Skilled Therapeutic Intervention   Assessment Pt. performed supine BLE exercises at bedside this session.    Activity Tolerance   Activity Tolerance Patient limited by fatigue;Patient limited by endurance   Safety Devices   Type of devices  --    Electronically signed by Izabela Lindo PTA on 3/20/2021 at 8:55 AM

## 2021-03-20 NOTE — PROGRESS NOTES
Progress Note    Patient name: Zohaib Jimenez  Patient : 1940  MR #[id number]  Room: 0    Portions of this note have been copied forward, however, changed to reflect the most current clinical status of this patient. Subjective: \"get me out of the bed\". Wants to ambulate    HISTORY OF PRESENT ILLNESS:  Mr. Philip Garcia is a very pleasant 81-year-old  gentleman referred by Dr. Sarah Beth Moore on the rehab unit with worsening thrombocytopenia and anemia for hematology opinion.     HEMATOLOGY HISTORY: Anemia and thrombocytopenia  Hunter Mello was seen as an inpatient at [facility] on the rehab unit on 3/19/2021 at the request of Dr. Sarah Beth Moore with worsening thrombocytopenia and anemia.     Hunter Mello has significant comorbid medical problems including morbid obesity and COPD requiring multiple admissions for respiratory distress and problems. He required intubation in 2021.     Hunter Mello was admitted to [address] [place] [address] 3/6/2021, treated for acute on chronic respiratory failure, COPD exacerbation and possible recurrent pneumonia, followed by Dr. Trae Villalobos. Swallowing evaluation was negative for signs or symptoms of aspiration. He was also treated for A. fib with RVR during hospitalization. Cardiac catheterization 3/10/2021 by Dr. 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 Pacemaker was placed 3/13/2021. He was admitted to rehab 3/15/2021 for strengthening with anticipation to return home.     Ashwin developed worsening thrombocytopenia and anemia. Thrombocytopenia is chronic, dating to at least 2017.    Platelets have ranged from 85,000 (2018) to a high of 228,000 (2021).   Intermittent anemia also dates to 2017.     Review of CBCs:             Labs 3/15/2021:  · Iron: 58, TIBC 172, iron saturation 32%, ferritin 135  · Folate: 3.6  · Vitamin B12: 377  · TSH: 3.24  · Vitamin D: 28.5  · CMP 3/19/2021: Creatinine discharge. Left eye: No discharge. Conjunctiva/sclera: Conjunctivae normal.   Neck:      Musculoskeletal: Neck supple. No muscular tenderness. Trachea: No tracheal deviation. Cardiovascular:      Rate and Rhythm: Normal rate and regular rhythm. Comments: pacemaker  Pulmonary:      Effort: Pulmonary effort is normal. No respiratory distress. Breath sounds: Examination of the right-lower field reveals decreased breath sounds. Examination of the left-lower field reveals decreased breath sounds. Decreased breath sounds present. No wheezing or rales. Comments: Wearing supplemental O2  Abdominal:      General: Bowel sounds are normal. There is no distension. Palpations: Abdomen is soft. Tenderness: There is no abdominal tenderness. There is no guarding. Genitourinary:     Comments: Exam deferred  Musculoskeletal:         General: No tenderness or deformity. Comments: Generalized weakness   Lymphadenopathy:      Comments:      Skin:     General: Skin is warm and dry. Coloration: Skin is pale. Findings: Bruising (right anterior chest, BUE) present. No rash. Neurological:      Mental Status: He is alert and oriented to person, place, and time. Comments: follows commands, non-focal   Psychiatric:         Behavior: Behavior normal. Behavior is cooperative. Thought Content: Thought content normal.         Judgment: Judgment normal.      Comments: Alert and oriented to person, place and time.          Vital Signs  BP (!) 153/60   Pulse 79   Temp 97.8 °F (36.6 °C) (Temporal)   Resp 16   Ht 6' (1.829 m)   Wt 250 lb (113.4 kg)   SpO2 93%   BMI 33.91 kg/m²     Intake/Output Summary (Last 24 hours) at 3/20/2021 1105  Last data filed at 3/20/2021 0842  Gross per 24 hour   Intake 720 ml   Output 500 ml   Net 220 ml     Labs:  CBC:   Recent Labs     03/19/21  0432 03/20/21  0419   WBC 8.2 7.1   HGB 7.0* 6.7*   PLT 88* 115*     CMP:   Recent Labs 21  0432   GLUCOSE 137*   BUN 47*   CREATININE 1.9*   CO2 28   CALCIUM 8.3*   ALKPHOS 81   AST 13   ALT 14     Hepatic:   Recent Labs     21  0432   AST 13   ALT 14   BILITOT 0.6   ALKPHOS 81     Troponin: No results for input(s): TROPONINI in the last 72 hours. BNP: No results for input(s): BNP in the last 72 hours. INR: No results for input(s): INR in the last 72 hours. ABG: No results for input(s): PHART, VCW7JSQ, PO2ART, XRN8TES, P6EHZCFC, BEART in the last 72 hours. 30 Day lookback of cultures:    Blood Culture Recent:   Recent Labs     21  1230   BC No growth after 5 days of incubation. Gram Stain Recent: No results for input(s): LABGRAM in the last 720 hours. Resp Culture Recent: No results for input(s): CULTRESP in the last 720 hours. Body Fluid Recent : No results for input(s): BFCX in the last 720 hours. MRSA Recent :   Recent Labs     21  1135   [address] [place] [address] No MRSA detected on culture      Urine Culture Recent : No results for input(s): LABURIN in the last 720 hours. Organism Recent : No results for input(s): ORG in the last 720 hours. ASSESSMENT/PLAN:  #1  Chronic thrombocytopenia    Platelets improved    Additional serology drawn on 3/19/2021:  · IPF: 11.9% (H)   · Copper: In process  · Zinc: In process  · RELL: In process  · antiplatelet antibody: In process  · Hepatitis ABC: Nonreactive  · HIV rapid 1&2: non-reactive   · SPEP: In process  · Ig (L), IgA 133, IgM 150  · Kappa light chains: In process  · Erythropoietin: In process    Plans for ultrasound spleen today, 3/20/2021    Pharmacy reviewed medications from 3/6/21 inpatient admission and 3/15/21 rehab admission for possible contributors for thrombocytopenia.   The following medications that the patient has received dose(s) of have thrombocytopenia listed as a possible adverse reaction:  Amiodarone  Atorvastatin  Bumetanide  Cefazolin  Cefuroxime  Digoxin  Diltiazem  Doxycycline  Enoxaparin  Furosemide  Levofloxacin  Meropenem  Metoprolol  Nifedipine  Pantoprazole  Vancomycin    #2   multifactorial anemia, iron/B12/folate deficiency, anemia of chronic disease, possibly CKD associated, multiple lab draws  Hgb 6.7/MCV 97.4 (status post 1 unit PRBC 3/12/2021) - transfuse 1 unit pRBC today     Status post Venofer 100 mg daily x3, completed 3/19/2021  Continue Venofer 400 mg IV today, 3/20/2021 and 300 mg IV 3/21/2021 (total 1 g)  Continue oral iron  Continue folate replacement  Last colonoscopy 9/11/2017 documented a hyperplastic sigmoid colon polypectomy  Check stool for OB  Labs as above     #3  Generalized weakness  PT/OT ongoing     #4  Recent A. Fib with RVR/pacemaker  As per cardiology     #5  History of bladder cancer  Followed by Dr. Garcia SLADE 3/17/2021 trace blood  Records being retrieved    I have seen, examined and reviewed patient medication list, appropriate labs and imaging studies. Relevant medical records and other physician notes have been reviewed. I answered all questions to the best of my knowledge and the patient's satisfaction. [address] [place] [address], [facility]  03/20/21  11:05 AM    Physician's attestation and contribution:  I, Dr Gerhardt Diesel, personally and independently performed an evaluation [facility]  [facility]        I have reviewed relevant medical information/data to include but not limited to the medication list, relevant appropriate lab work and imaging when applicable. I reviewed other physician's notes, ancillary services and nurses assessments. I have reviewed the above documentation completed by Selin BARBER   Please see my additional addended and/or modified contributions to the history of present illness, physical examination, and assessment/medical decision-making and plan that reflects my findings and impressions.   I discussed

## 2021-03-20 NOTE — CONSULTS
MEDICAL ONCOLOGY CONSULTATION     Pt Name: Yoshi Willett  YOB: 1940  MRN: [id number]  Room: 0     Date of admission: 3/15/2021  Date of evaluation: 3/19/2021  Referring Physician: Dr. Arnaud Woods    Reason for Consultation: worsening thrombocytopenia     History Obtained From:  patient, electronic medical record    HISTORY OF PRESENT ILLNESS:    Mr. Hayes Benitez is a very pleasant 80-year-old  gentleman referred by Dr. Carmen Mead on the rehab unit with worsening thrombocytopenia and anemia for hematology opinion. HEMATOLOGY HISTORY: Anemia and thrombocytopenia  [address] was seen as an inpatient at [facility] on the rehab unit on 3/19/2021 at the request of Dr. Carmen Mead with worsening thrombocytopenia and anemia. Rick Hidalgo has significant comorbid medical problems including morbid obesity and COPD requiring multiple admissions for respiratory distress and problems. He required intubation in January 2021. Rick Hidalgo was admitted to [facility] 3/6/2021, treated for acute on chronic respiratory failure, COPD exacerbation and possible recurrent pneumonia, followed by Dr. Luis Miguel Lott. Swallowing evaluation was negative for signs or symptoms of aspiration. He was also treated for A. fib with RVR during hospitalization. Cardiac catheterization 3/10/2021 by Dr. 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 Pacemaker was placed 3/13/2021. He was admitted to rehab 3/15/2021 for strengthening with anticipation to return home. Rick Hidalgo developed worsening thrombocytopenia and anemia. Thrombocytopenia is chronic, dating to at least 6/2017. Platelets have ranged from 85,000 (11/2018) to a high of 228,000 (1/17/2021).   Intermittent anemia also dates to 6/2017.     Review of CBCs:           Labs 3/15/2021:  · Iron: 58, TIBC 172, iron saturation 32%, ferritin 135  · Folate: 3.6  · Vitamin B12: 377  · TSH: 3.24  · Vitamin D: Frankie Galeazzi, MD at [address] ARTHROPLASTY Left 10/15/2018    LEFT COMPLEX TOTAL KNEE ARTHROPLASTY performed by Arturo Niño MD at [facility] VASCULAR SURGERY  04/21/2015    Kailee BESS Ultrasound guided access of left common femoral artery. Aortogram.Diagnostic right lower extremity arteriogram.Radiologic supervision and interpretation.  VASCULAR SURGERY  01/13/2015    Kailee Wylie M.D Atherectomy,angioplasty,and stenting of left superficial femoral artery.  VASCULAR SURGERY  03/11/2014    Kailee Wylie M.D. Ultrasound-guided access of right common femoral artery. Aortogram.Left lower extremity arteriogram.Atherectomy and angioplasty of left superficial femoral artery. Radiologic supervision and interpretation.  VASCULAR SURGERY  01/18/2013    Kailee BESS Aortogram.Multistation arteriogram right lower extremity. Laser atherectomy and angioplasty of right superficial femoral artery. Selective catheterization of right tibioperoneal trunk. Angioplasty of peroneal artery and tibioperoneal trunk.  VASCULAR SURGERY  10/30/2018    [facility]. Ultrasound guided cannulation of right internal vein. Placement of right internal jugular vein tunneled dialysis catheter bard equistream xk 23cm tip to cuff    VASCULAR SURGERY  12/17/2018    [facility]. Removal of tunneled dilaysis catheter right internal jugular vein.          Immunizations:    Immunization History   Administered Date(s) Administered    COVID-19, Pfizer, PF, 30mcg/0.3mL 03/04/2021    Influenza Virus Vaccine 11/12/2020    Pneumococcal Conjugate 13-valent ([id number]) 05/13/2017    Tdap (Boostrix, Adacel) 11/12/2020         Current Hospital Medications:    Current Facility-Administered Medications   Medication Dose Route Frequency Provider Last Rate Last Admin    linaclotide (LINZESS) capsule 145 mcg  145 mcg Oral QAM AC Jody Silvestre MD   145 mcg at 03/19/21 0620    sodium chloride flush 0.9 % injection Comment: 2 glasses of wine every night    Drug use: No    Sexual activity: Yes     Partners: Female   Lifestyle    Physical activity     Days per week: None     Minutes per session: None    Stress: None   Relationships    Social connections     Talks on phone: None     Gets together: None     Attends Yarsanism service: None     Active member of club or organization: None     Attends meetings of clubs or organizations: None     Relationship status: None    Intimate partner violence     Fear of current or ex partner: None     Emotionally abused: None     Physically abused: None     Forced sexual activity: None   Other Topics Concern    None   Social History Narrative    None         Family History:   Family History   Problem Relation Age of Onset    Colon Cancer Father     Diabetes Brother     Colon Polyps Neg Hx     Liver Cancer Neg Hx     Liver Disease Neg Hx     Esophageal Cancer Neg Hx     Rectal Cancer Neg Hx     Stomach Cancer Neg Hx        Review of Systems:  Constitutional: Dyspnea and shortness of breath  HENT: Negative for congestion, hearing loss, nosebleeds or sore throat. Eyes: Negative for photophobia, pain, discharge, redness and visual disturbance. Respiratory:  Dyspnea and shortness of breath and wheezing with scattered rales at bases. Cardiovascular: Negative for chest pain, palpitations or leg swelling. Gastrointestinal: Negative for abdominal pain, blood in stool, constipation, diarrhea, nausea or vomiting. Genitourinary: Negative for dysuria, flank pain, frequency, hematuria or urgency. Musculoskeletal: Negative for back pain, joint swelling, myalgias or neck pain. Skin: Negative for rash or petechiae. Neurological: Negative for tremors, seizures, syncope, weakness or headaches. Hematological: No active bruising or bleeding. Psychiatric/Behavioral: Negative for hallucinations.       Objective:  Vitals:    Vitals:    03/19/21 1815   BP: 124/64   Pulse: 92 Resp: 18   Temp: 97.8 °F (36.6 °C)   SpO2: 93%       Physical Exam   Constitutional: Oriented to person, place, and time. No acute distress, chronic cough with mild dyspnea. Head: Normocephalic and atraumatic. Nose: Nose normal.   Mouth/Throat: Oropharynx is clear and moist. No oropharyngeal exudate. Eyes: Pupils are equal and round. Conjunctivae and EOM are normal. No scleral icterus. Neck: Normal range of motion. Neck supple. No JVD. No appreciable thyromegaly. Cardiovascular: Normal rate, regular rhythm, normal heart sounds and intact distal pulses. Exam reveals no gallop, murmurs or friction rub. Pulmonary/Chest: Dyspnea and shortness of breath and wheezing with scattered rales at bases. Abdominal: Soft. Bowel sounds are normal. No organomegally or masses. No tenderness. There is no rebound and no guarding. Musculoskeletal: Normal range of motion. No edema or tenderness. Lymphadenopathy: No cervical, axillary or inguinal lymphadenopathy. Neurological: Alert and oriented to person, place, and time. Cranial nerves are intact. Neurological exam is nonfocal  Skin: Skin is warm and dry. No rash noted. No erythema. No pallor.    Psychiatric: Judgment normal.       DATA:    Labs:  General Labs:  CBC:   Lab Results   Component Value Date    WBC 8.2 03/19/2021    HGB 7.0 (L) 03/19/2021    HCT 23.4 (L) 03/19/2021    MCV 98.7 (H) 03/19/2021    PLT 88 (L) 03/19/2021       CMP:    Lab Results   Component Value Date     03/19/2021    K 4.8 03/19/2021     03/19/2021    CO2 28 03/19/2021    BUN 47 (H) 03/19/2021    CREATININE 1.9 (H) 03/19/2021    GLUCOSE 137 (H) 03/19/2021    CALCIUM 8.3 (L) 03/19/2021    PROT 4.6 (L) 03/19/2021    LABALBU 3.1 (L) 03/19/2021    BILITOT 0.6 03/19/2021    ALKPHOS 81 03/19/2021    AST 13 03/19/2021    ALT 14 03/19/2021    LABGLOM 34 (A) 03/19/2021    GFRAA 42 (L) 03/19/2021         30 Day lookback of cultures:    Blood Culture Recent:   Recent Labs 03/06/21  1230   BC No growth after 5 days of incubation. Gram Stain Recent: No results for input(s): LABGRAM in the last 720 hours. Resp Culture Recent: No results for input(s): CULTRESP in the last 720 hours. Body Fluid Recent : No results for input(s): BFCX in the last 720 hours. MRSA Recent :   Recent Labs     03/09/21  1135   [facility] No MRSA detected on culture      Urine Culture Recent : No results for input(s): LABURIN in the last 720 hours. Organism Recent : No results for input(s): ORG in the last 720 hours. IMPRESSION/RECOMMENDATIONS:    Mr. Jetty Gottron is a very pleasant 78-year-old  gentleman referred by Dr. Dean Buck on the rehab unit with worsening thrombocytopenia and anemia for hematology opinion. Vanessa Magaña has significant comorbid medical problems including morbid obesity and COPD requiring multiple admissions for respiratory distress and problems. He required intubation in January 2021. Vanessa Magaña was admitted to [address] [place] [address] 3/6/2021, treated for acute on chronic respiratory failure, COPD exacerbation and possible recurrent pneumonia, followed by Dr. Christo Flores. Swallowing evaluation was negative for signs or symptoms of aspiration. He was also treated for A. fib with RVR during hospitalization. Cardiac catheterization 3/10/2021 by Dr. 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 Pacemaker was placed 3/13/2021. He was admitted to rehab 3/15/2021 for strengthening with anticipation to return home. Vanessa Magaña developed worsening thrombocytopenia and anemia. Thrombocytopenia is chronic, dating to at least 6/2017. Platelets have ranged from 85,000 (11/2018) to a high of 228,000 (1/17/2021).   Intermittent anemia also dates to 6/2017.     Review of CBCs:           Labs 3/15/2021:  · Iron: 58, TIBC 172, iron saturation 32%, ferritin 135  · Folate: 3.6  · Vitamin B12: 377  · TSH: 3.24  · Vitamin D: 28.5  · CMP 3/19/2021: Creatinine 1.9/GFR 34, calcium 8.3, total protein 4.6     Ashwin is treated with ferrous sulfate 325 mg po BID and folic acid 1 mg po daily. He received Venofer 100 mg IV daily x3 (3/17/2021 - 3/19/2021). He is a former smoker and drinks 2 glasses of wine daily and an occasional beer. Hematology consultation requested    #1  Chronic thrombocytopenia  Platelets 97,616, previously 115,000 upon rehab admit 3/16/2021 and 131,000 on inpatient admit 3/6/2021     We will ask pharmacy to review medications for admission 3/6/2021 and current admission for possible contributors to thrombocytopenia. Request ultrasound to evaluate for hypersplenism. Additional serology requested: Copper, zinc, RELL, antiplatelet antibody, hepatitis/HIV, SPEP, QI, light chains, IPF    Serology will be reviewed when available with further hematology opinion to follow. #2   multifactorial anemia, iron/B12/folate deficiency, anemia of chronic disease, possibly CKD associated, multiple lab draws  Hgb 7.0/MCV 98.7 (status post 1 unit PRBC 3/12/2021)     Status post Venofer 100 mg daily x3, completed 3/19/2021  Continue oral iron  Continue folate replacement  Last colonoscopy 9/11/2017 documented a hyperplastic sigmoid colon polypectomy  Labs as above, EPO     Daily CBC will be monitored, no intervention warranted at this time    #3  Generalized weakness  PT/OT ongoing     #4   Recent A.  Fib with RVR/pacemaker  As per cardiology     #5  History of bladder cancer  Followed by Dr. Pricila SLADE 3/17/2021 trace blood  Records being retrieved    Giuseppe Victoria    03/19/21  11:42 PM

## 2021-03-20 NOTE — PROGRESS NOTES
Clinical house here to assist starting IV for blood transfusion. IV start unsuccessful- clinical house sending someone else to try.  Electronically signed by Sierra Torres RN on 3/20/21 at 9:30 AM CDT

## 2021-03-20 NOTE — PROGRESS NOTES
Occupational Therapy    Patient at test for first scheduled therapy, then placed on medical hold for the rest of the afternoon.  Electronically signed by Burak Miner OT on 3/20/2021 at 12:24 PM

## 2021-03-20 NOTE — PROGRESS NOTES
Patient:   Christel Sinha  MR#:    [id number]   Room:    3552/992-26   YOB: 1940  Date of Progress Note: 3/20/2021  Time of Note                           9:23 AM  Consulting Physician:   Alessia Estrada M.D. Attending Physician:  Alessia Estrada MD     Chief complaint Respiratory failure     S:This 79 y.o. male  with a past medical history of bladder cancer, HTN, CKD stage III, PVD, hyperlipidemia,CAD,COPD,CHF has oxygen at home but doesn't wear if often,ETOH use drinks 2 glasses of wine every night and former smoker. He presented to [facility] ER on 3/6/21 with shortness of breath. CXR done showed increased pulmonary vascular congestion and CT of chest showed bilateral pleural effusion. He was admitted to the hospitalist service with Acute on chronic hypoxemic and hypercapnic respiratory, COPD exacerbation and possible recurrent pneumonia. Pulmonology was consulted. He was seen by Dr. Janki Silveira, who didn't feel he had pneumoinia st this time, but did recommend Trilogy at discharge d/t patient not tolerating Bi-PAP for questionable sleep apnea. Patient had an episode of A-Fib with RVR. Cardiology was consulted. He was seen by Dr. Elpidio Sandoval. He underwent cardiac catheterization by Dr. Kae Cole on 3/10/21 revealing 100% occlusion right coronary artery which appears chronic in nature well collateralized from the left no significant disease on the left. Dr. Kae Cole recommended placement of a dual-chamber pacemaker. Patient was in agreement and went or surgery on 3/13/21. He tolerated the procedure well. SPT was consulted to evaluate swallow. He was noted to have oropharyngeal phase dysphagia but no s/s of aspiration, he was placed on a regular diet with thin liquids. Complains of some chronic edema in the feet and left upper extremity. No evidence of DVT by ultrasound. Worried about his enlarged spleen.     REVIEW OF SYSTEMS:  Constitutional: No fevers No chills  Neck:No stiffness  Respiratory: some shortness of breath  Cardiovascular: No chest pain No palpitations  Gastrointestinal: No abdominal pain    Genitourinary: No Dysuria  Neurological: No headache, no confusion    Past Medical History:      Diagnosis Date    Acute liver failure without hepatic coma 10/23/2018    Back pain     \"with tired legs as a result\"    Bladder cancer ([facility] [address] 12/19/2018    Blood circulation, collateral     Carotid arterial disease ([address]     recent surgery    CKD (chronic kidney disease), 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/2018    COPD with acute lower respiratory infection ([address] 2/24/2021    GERD (gastroesophageal reflux disease)     Hyperlipidemia     Hypertension     Hypertension     Palliative care patient 10/23/2018    Pneumonia due to infectious organism 11/06/2018    Primary osteoarthritis of left knee 10/14/2018    PVD (peripheral vascular disease) (HCC)     Tremor     Tremor on Right side x 1-2 weeks per stepdaughter    Type 2 diabetes mellitus with complication, without long-term current use of insulin ([facility] [address] 1/21/2021       Past Surgical History:      Procedure Laterality Date    BACK SURGERY      COLONOSCOPY  2007?     CYSTOSCOPY Bilateral 12/19/2018    CYSTOSCOPY, BIOSPY FULGURATION OF BLADDER TUMOR POSSIBLE TURBT, RETROGRADE PYELOGRAM performed by Audrene Boxer, MD at [address] COLONOSCOPY FLX DX W/COLLJ SPEC WHEN PFRMD N/A 9/11/2017    Dr La Nena Gomez internal hemorrhoids, diverticular disease-HP-No recall (age)   [place] REVISE MEDIAN N/CARPAL TUNNEL SURG Left 7/18/2018    OPEN CARPAL TUNNEL RELEASE performed by Juancarlos Rose MD at [address] Left 8/28/2018    LEFT CAROTID ENDARTERECTOMY WITH VEIN PATCH ANGIOPLASTY AND COMPLETION ANGIOGRAM performed by Will Ewing MD at [place] [address] Left 10/15/2018    LEFT COMPLEX TOTAL KNEE ARTHROPLASTY performed by Juancarlos Rose MD at [address] [place] [address] 04/21/2015    CHARLY BESS Ultrasound guided access of left common femoral artery. Aortogram.Diagnostic right lower extremity arteriogram.Radiologic supervision and interpretation.  VASCULAR SURGERY  01/13/2015    Rosemary Lopez M.D Atherectomy,angioplasty,and stenting of left superficial femoral artery.  VASCULAR SURGERY  03/11/2014    Rosemary Lopez M.D. Ultrasound-guided access of right common femoral artery. Aortogram.Left lower extremity arteriogram.Atherectomy and angioplasty of left superficial femoral artery. Radiologic supervision and interpretation.  VASCULAR SURGERY  01/18/2013    Rosemary BESS Aortogram.Multistation arteriogram right lower extremity. Laser atherectomy and angioplasty of right superficial femoral artery. Selective catheterization of right tibioperoneal trunk. Angioplasty of peroneal artery and tibioperoneal trunk.  VASCULAR SURGERY  10/30/2018    [facility]. Ultrasound guided cannulation of right internal vein. Placement of right internal jugular vein tunneled dialysis catheter bard equistream xk 23cm tip to cuff    VASCULAR SURGERY  12/17/2018    [facility]. Removal of tunneled dilaysis catheter right internal jugular vein.        Medications in Hospital:      Current Facility-Administered Medications:     0.9 % sodium chloride infusion, , Intravenous, PRN, Cezar Fernandez MD    linaclotide [facility]) capsule 145 mcg, 145 mcg, Oral, QAM AC, Rashad Beltran MD, 145 mcg at 03/19/21 0620    sodium chloride flush 0.9 % injection 10 mL, 10 mL, Intravenous, BID, Rashad Beltran MD, 10 mL at 03/20/21 0750    ondansetron (ZOFRAN) injection 4 mg, 4 mg, Intravenous, Q6H PRN, Yves Kumar MD    sodium chloride flush 0.9 % injection 10 mL, 10 mL, Intravenous, PRN, Yves Kumar MD    [DISCONTINUED] acetaminophen (TYLENOL) tablet 650 mg, 650 mg, Oral, Q6H PRN **OR** acetaminophen (TYLENOL) suppository 650 mg, 650 mg, Rectal, Q6H PRN, Yves Kumar MD    dextrose 5 % solution, 100 mL/hr, Intravenous, PRN, [employer], MD    dextrose 50 % IV solution, 12.5 g, Intravenous, PRN, [employer], MD    glucagon (rDNA) injection 1 mg, 1 mg, Intramuscular, PRN, [employer], MD    glucose (GLUTOSE) 40 % oral gel 15 g, 15 g, Oral, PRN, [employer], MD    amiodarone (CORDARONE) tablet 200 mg, 200 mg, Oral, BID, [employer], MD, 200 mg at 03/20/21 0746    aspirin EC tablet 81 mg, 81 mg, Oral, Daily, [employer] Carrasco MD, 81 mg at 03/20/21 0746    atorvastatin (LIPITOR) tablet 20 mg, 20 mg, Oral, Daily, Family [employer]rasco MD, 20 mg at 03/20/21 0746    bisacodyl (DULCOLAX) EC tablet 5 mg, 5 mg, Oral, Daily PRN, [employer], MD    calcium carbonate (TUMS) chewable tablet 500 mg, 500 mg, Oral, TID PRN, Family [employer] Carrasco MD    ferrous sulfate (IRON 325) tablet 325 mg, 325 mg, Oral, BID , [employer], MD, 325 mg at 29/56/52 4468    folic acid (FOLVITE) tablet 1 mg, 1 mg, Oral, Daily, Family Enrico Carrasco MD, 1 mg at 03/20/21 0747    guaiFENesin (ROBITUSSIN) 100 MG/5ML liquid 200 mg, 200 mg, Oral, Q4H PRN, [employer], MD    insulin lispro (HUMALOG) injection vial 0-6 Units, 0-6 Units, Subcutaneous, TID WC, [employer], MD, 2 Units at 03/19/21 1720    insulin lispro (HUMALOG) injection vial 0-3 Units, 0-3 Units, Subcutaneous, Nightly, [employer], MD, 1 Units at 03/19/21 2101    ipratropium-albuterol (DUONEB) nebulizer solution 1 ampule, 1 ampule, Inhalation, Q4H PRN, [employer], MD    metoprolol succinate (TOPROL XL) extended release tablet 100 mg, 100 mg, Oral, Daily, Family En[employer] Carrasco MD, 100 mg at 03/20/21 0746    miconazole (MICOTIN) 2 % powder, , Topical, 4x Daily, Family Enrico Carrasco MD, Given at 03/20/21 0750    NIFEdipine (PROCARDIA XL) extended release tablet 60 mg, 60 mg, Oral, Daily, Family Enrico Carrasco MD, 60 mg at appropriate  Recent and remote memory appears unremarkable  Speech normal without dysarthria  No clear issues with language of fund of knowledge     Cranial Nerve Exam     CN III, IV,VI-EOMI, No nystagmus, conjugate eye movements, no ptosis    CN VII-no facial assymetry       Motor Exam  antigravity throughout upper and lower extremities bilaterally      Tremors- no tremors in hands or head noted     Gait  Not tested        Nursing/pcp notes, imaging,labs and vitals reviewed. PT,OT and/or speech notes reviewed    Lab Results   Component Value Date    WBC 7.1 03/20/2021    HGB 6.7 (L) 03/20/2021    HCT 22.3 (L) 03/20/2021    MCV 97.4 (H) 03/20/2021     (L) 03/20/2021     Lab Results   Component Value Date     03/19/2021    K 4.8 03/19/2021     03/19/2021    CO2 28 03/19/2021    BUN 47 (H) 03/19/2021    CREATININE 1.9 (H) 03/19/2021    GLUCOSE 137 (H) 03/19/2021    CALCIUM 8.3 (L) 03/19/2021    PROT 4.6 (L) 03/19/2021    LABALBU 3.1 (L) 03/19/2021    BILITOT 0.6 03/19/2021    ALKPHOS 81 03/19/2021    AST 13 03/19/2021    ALT 14 03/19/2021    LABGLOM 34 (A) 03/19/2021    GFRAA 42 (L) 03/19/2021     Lab Results   Component Value Date    INR 1.11 03/15/2021    INR 1.13 03/06/2021    INR 1.16 01/21/2021    PROTIME 14.3 03/15/2021    PROTIME 14.4 03/06/2021    PROTIME 14.8 (H) 01/21/2021 03/20/21 0832 03/20/21 0838 03/20/21 0839   Pain Screening   Patient Currently in Pain No  --   --    Oxygen Therapy   O2 Device Nasal cannula  --   --    O2 Flow Rate (L/min) 2 L/min  --   --    Exercises   Comments  --  Supine BLE ex  x20 reps performed at bedside.   --    Conditions Requiring Skilled Therapeutic Intervention   Assessment  --   --   --    Activity Tolerance   Activity Tolerance  --   --   --    Safety Devices   Type of devices  --   --  Bed alarm in place;Call light within reach        03/20/21 0838   Pain Screening   Patient Currently in Pain  --    Oxygen Therapy   O2 Device  --    O2 Flow

## 2021-03-21 LAB
GLUCOSE BLD-MCNC: 183 MG/DL (ref 70–99)
GLUCOSE BLD-MCNC: 185 MG/DL (ref 70–99)
GLUCOSE BLD-MCNC: 201 MG/DL (ref 70–99)
GLUCOSE BLD-MCNC: 268 MG/DL (ref 70–99)
HCT VFR BLD CALC: 26 % (ref 42–52)
HEMOGLOBIN: 8 G/DL (ref 14–18)
MCH RBC QN AUTO: 29.4 PG (ref 27–31)
MCHC RBC AUTO-ENTMCNC: 30.8 G/DL (ref 33–37)
MCV RBC AUTO: 95.6 FL (ref 80–94)
PDW BLD-RTO: 18.6 % (ref 11.5–14.5)
PERFORMED ON: ABNORMAL
PLATELET # BLD: 101 K/UL (ref 130–400)
PMV BLD AUTO: 13.1 FL (ref 9.4–12.4)
RBC # BLD: 2.72 M/UL (ref 4.7–6.1)
WBC # BLD: 8.9 K/UL (ref 4.8–10.8)

## 2021-03-21 PROCEDURE — 6370000000 HC RX 637 (ALT 250 FOR IP): Performed by: HOSPITALIST

## 2021-03-21 PROCEDURE — 85027 COMPLETE CBC AUTOMATED: CPT

## 2021-03-21 PROCEDURE — 82947 ASSAY GLUCOSE BLOOD QUANT: CPT

## 2021-03-21 PROCEDURE — 2580000003 HC RX 258: Performed by: NURSE PRACTITIONER

## 2021-03-21 PROCEDURE — 2700000000 HC OXYGEN THERAPY PER DAY

## 2021-03-21 PROCEDURE — 99231 SBSQ HOSP IP/OBS SF/LOW 25: CPT | Performed by: INTERNAL MEDICINE

## 2021-03-21 PROCEDURE — 1180000000 HC REHAB R&B

## 2021-03-21 PROCEDURE — 36415 COLL VENOUS BLD VENIPUNCTURE: CPT

## 2021-03-21 PROCEDURE — 6360000002 HC RX W HCPCS: Performed by: NURSE PRACTITIONER

## 2021-03-21 PROCEDURE — 2580000003 HC RX 258: Performed by: PSYCHIATRY & NEUROLOGY

## 2021-03-21 PROCEDURE — 99222 1ST HOSP IP/OBS MODERATE 55: CPT | Performed by: INTERNAL MEDICINE

## 2021-03-21 RX ADMIN — SODIUM CHLORIDE, PRESERVATIVE FREE 10 ML: 5 INJECTION INTRAVENOUS at 20:26

## 2021-03-21 RX ADMIN — MICONAZOLE NITRATE: 2 POWDER TOPICAL at 17:04

## 2021-03-21 RX ADMIN — NIFEDIPINE 60 MG: 60 TABLET, EXTENDED RELEASE ORAL at 09:00

## 2021-03-21 RX ADMIN — MICONAZOLE NITRATE: 2 POWDER TOPICAL at 09:16

## 2021-03-21 RX ADMIN — SODIUM CHLORIDE, PRESERVATIVE FREE 10 ML: 5 INJECTION INTRAVENOUS at 09:05

## 2021-03-21 RX ADMIN — ATORVASTATIN CALCIUM 20 MG: 20 TABLET, FILM COATED ORAL at 09:01

## 2021-03-21 RX ADMIN — AMIODARONE HYDROCHLORIDE 200 MG: 200 TABLET ORAL at 09:01

## 2021-03-21 RX ADMIN — IRON SUCROSE 300 MG: 20 INJECTION, SOLUTION INTRAVENOUS at 09:07

## 2021-03-21 RX ADMIN — INSULIN LISPRO 3 UNITS: 100 INJECTION, SOLUTION INTRAVENOUS; SUBCUTANEOUS at 11:52

## 2021-03-21 RX ADMIN — AMIODARONE HYDROCHLORIDE 200 MG: 200 TABLET ORAL at 20:26

## 2021-03-21 RX ADMIN — INSULIN LISPRO 1 UNITS: 100 INJECTION, SOLUTION INTRAVENOUS; SUBCUTANEOUS at 08:58

## 2021-03-21 RX ADMIN — TAMSULOSIN HYDROCHLORIDE 0.4 MG: 0.4 CAPSULE ORAL at 20:26

## 2021-03-21 RX ADMIN — TAMSULOSIN HYDROCHLORIDE 0.4 MG: 0.4 CAPSULE ORAL at 09:00

## 2021-03-21 RX ADMIN — FOLIC ACID 1 MG: 1 TABLET ORAL at 09:00

## 2021-03-21 RX ADMIN — FERROUS SULFATE TAB 325 MG (65 MG ELEMENTAL FE) 325 MG: 325 (65 FE) TAB at 17:01

## 2021-03-21 RX ADMIN — PANTOPRAZOLE SODIUM 40 MG: 40 TABLET, DELAYED RELEASE ORAL at 09:01

## 2021-03-21 RX ADMIN — FERROUS SULFATE TAB 325 MG (65 MG ELEMENTAL FE) 325 MG: 325 (65 FE) TAB at 09:01

## 2021-03-21 RX ADMIN — MICONAZOLE NITRATE: 2 POWDER TOPICAL at 20:30

## 2021-03-21 RX ADMIN — INSULIN LISPRO 2 UNITS: 100 INJECTION, SOLUTION INTRAVENOUS; SUBCUTANEOUS at 17:01

## 2021-03-21 RX ADMIN — ASPIRIN 81 MG: 81 TABLET, FILM COATED ORAL at 09:01

## 2021-03-21 ASSESSMENT — ENCOUNTER SYMPTOMS
CONSTIPATION: 1
SORE THROAT: 0
DIARRHEA: 0
CONSTIPATION: 0
TROUBLE SWALLOWING: 0
NAUSEA: 0
SHORTNESS OF BREATH: 1
EYES NEGATIVE: 1
ABDOMINAL PAIN: 0
WHEEZING: 1
EYE DISCHARGE: 0
VOMITING: 0
EYE ITCHING: 0
COUGH: 0
WHEEZING: 0
BLOOD IN STOOL: 0

## 2021-03-21 NOTE — PROGRESS NOTES
Progress Note    Patient name: Toma Duarte  Patient : 1940  MR #[id number]  Room: 0    Portions of this note have been copied forward, however, changed to reflect the most current clinical status of this patient. Subjective: \"don't feel as well today, I don't know why\". Shortness of breath worse this am.    HISTORY OF PRESENT ILLNESS:  Mr. Georgi Jo is a very pleasant 71-year-old  gentleman referred by Dr. Bernadine Pozo on the rehab unit with worsening thrombocytopenia and anemia for hematology opinion.     HEMATOLOGY HISTORY: Anemia and thrombocytopenia  Janey Pond was seen as an inpatient at [address] [place] [address] on the rehab unit on 3/19/2021 at the request of Dr. Bernadine Pozo with worsening thrombocytopenia and anemia.     Janey Pond has significant comorbid medical problems including morbid obesity and COPD requiring multiple admissions for respiratory distress and problems. He required intubation in 2021.     Janey Pond was admitted to [address] [place] [address] 3/6/2021, treated for acute on chronic respiratory failure, COPD exacerbation and possible recurrent pneumonia, followed by Dr. Melissa Banks. Swallowing evaluation was negative for signs or symptoms of aspiration. He was also treated for A. fib with RVR during hospitalization. Cardiac catheterization 3/10/2021 by Dr. Jacqueline Watson revealed 100% occlusion of the right coronary artery, which appeared chronic with collateralization. Pacemaker was placed 3/13/2021. He was admitted to rehab 3/15/2021 for strengthening with anticipation to return home.     Ashwin developed worsening thrombocytopenia and anemia. Thrombocytopenia is chronic, dating to at least 2017.    Platelets have ranged from 85,000 (2018) to a high of 228,000 (2021).   Intermittent anemia also dates to 2017.     Review of CBCs:             Labs 3/15/2021:  · Iron: 58, TIBC 172, iron saturation 32%, ferritin 135  · Folate: 3.6  · Vitamin B12: 377  · TSH: 3.24  · Vitamin D: 28.5  · CMP 3/19/2021: Creatinine 1.9/GFR 34, calcium 8.3, total protein 4.6     Ashwin is treated with ferrous sulfate 325 mg po BID and folic acid 1 mg po daily. He received Venofer 100 mg IV daily x3 (3/17/2021 - 3/19/2021). He is a former smoker and drinks 2 glasses of wine daily and an occasional beer.     Hematology consultation requested    Subjective   REVIEW OF SYSTEMS:   Review of Systems   Constitutional: Positive for activity change and fatigue. Negative for fever. HENT: Negative for dental problem, mouth sores, nosebleeds, sore throat and trouble swallowing. Eyes: Negative for discharge and itching. Respiratory: Positive for shortness of breath. Negative for cough and wheezing. H/o COPD   Cardiovascular: Negative for chest pain, palpitations and leg swelling. H/o afib   Gastrointestinal: Negative for abdominal pain, constipation, diarrhea, nausea and vomiting. Endocrine: Negative for cold intolerance and heat intolerance. Genitourinary: Negative for dysuria, frequency, hematuria and urgency. Musculoskeletal: Positive for arthralgias and gait problem (generalized weakness, short of breath with exertion). Negative for joint swelling and myalgias. Skin: Negative for pallor and rash. LUE weeping   Allergic/Immunologic: Negative for environmental allergies and immunocompromised state. Neurological: Negative for seizures, syncope and numbness. Hematological: Negative for adenopathy. Bruises/bleeds easily. Psychiatric/Behavioral: Negative for agitation, behavioral problems and confusion. Objective   PHYSICAL EXAM:  Physical Exam  Vitals signs reviewed. Constitutional:       Appearance: He is well-developed. He is not toxic-appearing or diaphoretic. Comments: Appears chronically ill   HENT:      Head: Normocephalic and atraumatic.       Right Ear: External ear normal.      Left Ear: External ear normal.      Nose: Nose normal.      Mouth/Throat:      Mouth: Mucous membranes are moist.   Eyes:      General: No scleral icterus. Right eye: No discharge. Left eye: No discharge. Conjunctiva/sclera: Conjunctivae normal.   Neck:      Musculoskeletal: Neck supple. No muscular tenderness. Trachea: No tracheal deviation. Cardiovascular:      Rate and Rhythm: Normal rate and regular rhythm. Comments: pacemaker  Pulmonary:      Effort: Pulmonary effort is normal. No respiratory distress. Breath sounds: Examination of the right-lower field reveals decreased breath sounds. Examination of the left-lower field reveals decreased breath sounds. Decreased breath sounds present. No wheezing or rales. Comments: Wearing supplemental O2  Abdominal:      General: Bowel sounds are normal. There is no distension. Palpations: Abdomen is soft. Tenderness: There is no abdominal tenderness. There is no guarding. Genitourinary:     Comments: Exam deferred  Musculoskeletal:         General: No tenderness or deformity. Comments: Generalized weakness   Lymphadenopathy:      Comments:      Skin:     General: Skin is warm and dry. Coloration: Skin is pale. Findings: Bruising (right anterior chest, BUE) present. No rash. Comments: LUE with significant bruising, edema and weeping. Wrapped in [place] with visible drainage noted to dressing and eva pad beneath his arm   Neurological:      Mental Status: He is alert and oriented to person, place, and time. Comments: follows commands, non-focal   Psychiatric:         Behavior: Behavior normal. Behavior is cooperative. Thought Content: Thought content normal.         Judgment: Judgment normal.      Comments: Alert and oriented to person, place and time.      Vital Signs  /64   Pulse 61   Temp 97.2 °F (36.2 °C) (Temporal)   Resp 18   Ht 6' (1.829 m)   Wt 250 lb (113.4 kg)   SpO2 90%   BMI 33.91 kg/m²     Intake/Output Summary (Last 24 hours) [facility] filed at 3/21/2021 0646  Gross per 24 hour   Intake 813.33 ml   Output 300 ml   Net 513.33 ml     Labs:  CBC:   Recent Labs     21  0432 21  0419 21   WBC 8.2 7.1  --  8.9   HGB 7.0* 6.7* 8.1* 8.0*   PLT 88* 115*  --  101*     CMP:   Recent Labs     21   GLUCOSE 137*   BUN 47*   CREATININE 1.9*   CO2 28   CALCIUM 8.3*   ALKPHOS 81   AST 13   ALT 14     Hepatic:   Recent Labs     21   AST 13   ALT 14   BILITOT 0.6   ALKPHOS 81     Troponin: No results for input(s): TROPONINI in the last 72 hours. BNP: No results for input(s): BNP in the last 72 hours. INR: No results for input(s): INR in the last 72 hours. ABG: No results for input(s): PHART, XJZ1KWI, PO2ART, CLM7PIW, [id number], BEART in the last 72 hours. 30 Day lookback of cultures:    Blood Culture Recent:   Recent Labs     21  1230   BC No growth after 5 days of incubation. Gram Stain Recent: No results for input(s): LABGRAM in the last 720 hours. Resp Culture Recent: No results for input(s): CULTRESP in the last 720 hours. Body Fluid Recent : No results for input(s): BFCX in the last 720 hours. MRSA Recent :   Recent Labs     21  1135   [facility] No MRSA detected on culture      Urine Culture Recent : No results for input(s): LABURIN in the last 720 hours. Organism Recent : No results for input(s): ORG in the last 720 hours. ASSESSMENT/PLAN:    #1  Chronic thrombocytopenia      Additional serology drawn on 3/19/2021:  · IPF: 11.9% (H) - suggesting a functioning marrow  · Copper: In process  · Zinc: In process  · RELL: In process  · antiplatelet antibody: In process  · Hepatitis ABC: Nonreactive  · HIV rapid 1&2: non-reactive   · SPEP: In process  · Ig (L), IgA 133, IgM 150  · Kappa light chains:  In process  · Erythropoietin: In process    ultrasound spleen 3/20/2021: normal spleen size of 11.1 by 3.6 x 13.0 cm    Pharmacy reviewed medications from 3/6/21 inpatient admission and 3/15/21 rehab admission for possible contributors for thrombocytopenia. The following medications that the patient has received dose(s) of have thrombocytopenia listed as a possible adverse reaction:  Amiodarone  Atorvastatin  Bumetanide  Cefazolin  Cefuroxime  Digoxin  Diltiazem  Doxycycline  Enoxaparin  Furosemide  Levofloxacin  Meropenem  Metoprolol  Nifedipine  Pantoprazole  Vancomycin    #2   multifactorial anemia, iron/B12/folate deficiency, anemia of chronic disease, possibly CKD associated, multiple lab draws  Hgb 8.0/MCV 95.6 (status post 1 unit PRBC 3/12/2021 & 3/20/2021)      Status post Venofer 100 mg daily x3, completed 3/19/2021  Venofer 400 mg IV given 3/20/2021, 300 mg IV due today, 3/21/2021 (total 1 g)  Continue oral iron  Continue folate replacement  Last colonoscopy 9/11/2017 documented a hyperplastic sigmoid colon polypectomy  Check stool for OB - specimen needs to be collected  Consult GI  Labs as above       #3  Respiratory Failure, h/o CHF/COPD  Supplemental O2  As per attending    Generalized weakness  PT/OT ongoing     #4  Recent A. Fib with RVR/pacemaker  As per cardiology     #5  History of bladder cancer  Followed by Dr. Vannesa Patino   UA 3/17/2021 trace blood  Records being retrieved    I have seen, examined and reviewed patient medication list, appropriate labs and imaging studies. Relevant medical records and other physician notes have been reviewed. I answered all questions to the best of my knowledge and the patient's satisfaction. [address] [facility], APRN  03/21/21  8:49 AM    Physician's attestation and contribution:  I, Dr Liseth Mcclain, personally and independently performed an evaluation on  Luanne Goldmann        I have reviewed relevant medical information/data to include but not limited to the medication list, relevant appropriate lab work and imaging when applicable.    I reviewed other physician's notes, ancillary services and nurses

## 2021-03-21 NOTE — PLAN OF CARE
Problem: Falls - Risk of:  Goal: Will remain free from falls  Description: Will remain free from falls  3/21/2021 1043 by Ramo Cabrales RN  Outcome: Ongoing  3/21/2021 0013 by Ketan Pugh RN  Outcome: Ongoing   Up with 1 or 2 assist depending on fatigue level

## 2021-03-22 ENCOUNTER — APPOINTMENT (OUTPATIENT)
Dept: GENERAL RADIOLOGY | Age: 81
DRG: 189 | End: 2021-03-22
Attending: PSYCHIATRY & NEUROLOGY
Payer: MEDICARE

## 2021-03-22 LAB
ALBUMIN SERPL-MCNC: 2.9 G/DL (ref 3.5–5.2)
ALP BLD-CCNC: 75 U/L (ref 40–130)
ALT SERPL-CCNC: 16 U/L (ref 5–41)
ANION GAP SERPL CALCULATED.3IONS-SCNC: 10 MMOL/L (ref 7–19)
AST SERPL-CCNC: 16 U/L (ref 5–40)
BASOPHILS ABSOLUTE: 0 K/UL (ref 0–0.2)
BASOPHILS RELATIVE PERCENT: 0.2 % (ref 0–1)
BILIRUB SERPL-MCNC: 0.6 MG/DL (ref 0.2–1.2)
BUN BLDV-MCNC: 45 MG/DL (ref 8–23)
CALCIUM SERPL-MCNC: 7.8 MG/DL (ref 8.8–10.2)
CHLORIDE BLD-SCNC: 103 MMOL/L (ref 98–111)
CO2: 27 MMOL/L (ref 22–29)
CREAT SERPL-MCNC: 2 MG/DL (ref 0.5–1.2)
EOSINOPHILS ABSOLUTE: 0 K/UL (ref 0–0.6)
EOSINOPHILS RELATIVE PERCENT: 0 % (ref 0–5)
GFR AFRICAN AMERICAN: 39
GFR NON-AFRICAN AMERICAN: 32
GLUCOSE BLD-MCNC: 166 MG/DL (ref 74–109)
GLUCOSE BLD-MCNC: 180 MG/DL (ref 70–99)
GLUCOSE BLD-MCNC: 194 MG/DL (ref 70–99)
GLUCOSE BLD-MCNC: 202 MG/DL (ref 70–99)
GLUCOSE BLD-MCNC: 272 MG/DL (ref 70–99)
HCT VFR BLD CALC: 25.6 % (ref 42–52)
HEMOGLOBIN: 7.9 G/DL (ref 14–18)
IMMATURE GRANULOCYTES #: 0.2 K/UL
LYMPHOCYTES ABSOLUTE: 0.5 K/UL (ref 1.1–4.5)
LYMPHOCYTES RELATIVE PERCENT: 6.1 % (ref 20–40)
MCH RBC QN AUTO: 29.4 PG (ref 27–31)
MCHC RBC AUTO-ENTMCNC: 30.9 G/DL (ref 33–37)
MCV RBC AUTO: 95.2 FL (ref 80–94)
MONOCYTES ABSOLUTE: 0.7 K/UL (ref 0–0.9)
MONOCYTES RELATIVE PERCENT: 7.9 % (ref 0–10)
NEUTROPHILS ABSOLUTE: 7 K/UL (ref 1.5–7.5)
NEUTROPHILS RELATIVE PERCENT: 83.4 % (ref 50–65)
PDW BLD-RTO: 18.2 % (ref 11.5–14.5)
PERFORMED ON: ABNORMAL
PLATELET # BLD: 99 K/UL (ref 130–400)
PMV BLD AUTO: 13.3 FL (ref 9.4–12.4)
POTASSIUM REFLEX MAGNESIUM: 5.2 MMOL/L (ref 3.5–5)
RBC # BLD: 2.69 M/UL (ref 4.7–6.1)
SODIUM BLD-SCNC: 140 MMOL/L (ref 136–145)
TOTAL PROTEIN: 4.5 G/DL (ref 6.6–8.7)
WBC # BLD: 8.4 K/UL (ref 4.8–10.8)

## 2021-03-22 PROCEDURE — 36415 COLL VENOUS BLD VENIPUNCTURE: CPT

## 2021-03-22 PROCEDURE — 6370000000 HC RX 637 (ALT 250 FOR IP): Performed by: HOSPITALIST

## 2021-03-22 PROCEDURE — 97530 THERAPEUTIC ACTIVITIES: CPT

## 2021-03-22 PROCEDURE — 6360000002 HC RX W HCPCS: Performed by: INTERNAL MEDICINE

## 2021-03-22 PROCEDURE — 97110 THERAPEUTIC EXERCISES: CPT

## 2021-03-22 PROCEDURE — 99232 SBSQ HOSP IP/OBS MODERATE 35: CPT | Performed by: PSYCHIATRY & NEUROLOGY

## 2021-03-22 PROCEDURE — 82947 ASSAY GLUCOSE BLOOD QUANT: CPT

## 2021-03-22 PROCEDURE — 1180000000 HC REHAB R&B

## 2021-03-22 PROCEDURE — 94640 AIRWAY INHALATION TREATMENT: CPT

## 2021-03-22 PROCEDURE — 99232 SBSQ HOSP IP/OBS MODERATE 35: CPT | Performed by: INTERNAL MEDICINE

## 2021-03-22 PROCEDURE — 2700000000 HC OXYGEN THERAPY PER DAY

## 2021-03-22 PROCEDURE — 2580000003 HC RX 258: Performed by: PSYCHIATRY & NEUROLOGY

## 2021-03-22 PROCEDURE — 99231 SBSQ HOSP IP/OBS SF/LOW 25: CPT | Performed by: INTERNAL MEDICINE

## 2021-03-22 PROCEDURE — 85025 COMPLETE CBC W/AUTO DIFF WBC: CPT

## 2021-03-22 PROCEDURE — 80053 COMPREHEN METABOLIC PANEL: CPT

## 2021-03-22 PROCEDURE — 6370000000 HC RX 637 (ALT 250 FOR IP): Performed by: PSYCHIATRY & NEUROLOGY

## 2021-03-22 PROCEDURE — 99223 1ST HOSP IP/OBS HIGH 75: CPT | Performed by: INTERNAL MEDICINE

## 2021-03-22 PROCEDURE — 71046 X-RAY EXAM CHEST 2 VIEWS: CPT

## 2021-03-22 PROCEDURE — 2580000003 HC RX 258: Performed by: HOSPITALIST

## 2021-03-22 RX ORDER — BUMETANIDE 1 MG/1
2 TABLET ORAL ONCE
Status: DISCONTINUED | OUTPATIENT
Start: 2021-03-22 | End: 2021-03-22

## 2021-03-22 RX ORDER — FUROSEMIDE 10 MG/ML
40 INJECTION INTRAMUSCULAR; INTRAVENOUS ONCE
Status: COMPLETED | OUTPATIENT
Start: 2021-03-22 | End: 2021-03-22

## 2021-03-22 RX ORDER — ARFORMOTEROL TARTRATE 15 UG/2ML
15 SOLUTION RESPIRATORY (INHALATION) 2 TIMES DAILY
Status: DISCONTINUED | OUTPATIENT
Start: 2021-03-22 | End: 2021-03-29 | Stop reason: HOSPADM

## 2021-03-22 RX ORDER — BUDESONIDE 0.5 MG/2ML
0.5 INHALANT ORAL 2 TIMES DAILY
Status: DISCONTINUED | OUTPATIENT
Start: 2021-03-22 | End: 2021-03-29 | Stop reason: HOSPADM

## 2021-03-22 RX ORDER — FUROSEMIDE 10 MG/ML
40 INJECTION INTRAMUSCULAR; INTRAVENOUS 2 TIMES DAILY
Status: DISCONTINUED | OUTPATIENT
Start: 2021-03-22 | End: 2021-03-24

## 2021-03-22 RX ADMIN — BUDESONIDE 500 MCG: 0.5 SUSPENSION RESPIRATORY (INHALATION) at 18:49

## 2021-03-22 RX ADMIN — MICONAZOLE NITRATE: 2 POWDER TOPICAL at 08:21

## 2021-03-22 RX ADMIN — TAMSULOSIN HYDROCHLORIDE 0.4 MG: 0.4 CAPSULE ORAL at 08:18

## 2021-03-22 RX ADMIN — FUROSEMIDE 40 MG: 10 INJECTION, SOLUTION INTRAMUSCULAR; INTRAVENOUS at 11:26

## 2021-03-22 RX ADMIN — SODIUM CHLORIDE, PRESERVATIVE FREE 10 ML: 5 INJECTION INTRAVENOUS at 20:58

## 2021-03-22 RX ADMIN — MICONAZOLE NITRATE: 2 POWDER TOPICAL at 20:50

## 2021-03-22 RX ADMIN — ERGOCALCIFEROL 50000 UNITS: 1.25 CAPSULE ORAL at 08:18

## 2021-03-22 RX ADMIN — LINACLOTIDE 145 MCG: 145 CAPSULE, GELATIN COATED ORAL at 05:36

## 2021-03-22 RX ADMIN — ASPIRIN 81 MG: 81 TABLET, FILM COATED ORAL at 08:18

## 2021-03-22 RX ADMIN — MICONAZOLE NITRATE: 2 POWDER TOPICAL at 16:49

## 2021-03-22 RX ADMIN — ATORVASTATIN CALCIUM 20 MG: 20 TABLET, FILM COATED ORAL at 08:18

## 2021-03-22 RX ADMIN — INSULIN LISPRO 1 UNITS: 100 INJECTION, SOLUTION INTRAVENOUS; SUBCUTANEOUS at 16:47

## 2021-03-22 RX ADMIN — INSULIN LISPRO 1 UNITS: 100 INJECTION, SOLUTION INTRAVENOUS; SUBCUTANEOUS at 08:17

## 2021-03-22 RX ADMIN — FERROUS SULFATE TAB 325 MG (65 MG ELEMENTAL FE) 325 MG: 325 (65 FE) TAB at 16:47

## 2021-03-22 RX ADMIN — PANTOPRAZOLE SODIUM 40 MG: 40 TABLET, DELAYED RELEASE ORAL at 08:18

## 2021-03-22 RX ADMIN — SODIUM CHLORIDE, PRESERVATIVE FREE 10 ML: 5 INJECTION INTRAVENOUS at 16:50

## 2021-03-22 RX ADMIN — INSULIN LISPRO 3 UNITS: 100 INJECTION, SOLUTION INTRAVENOUS; SUBCUTANEOUS at 11:57

## 2021-03-22 RX ADMIN — AMIODARONE HYDROCHLORIDE 200 MG: 200 TABLET ORAL at 08:18

## 2021-03-22 RX ADMIN — IPRATROPIUM BROMIDE AND ALBUTEROL SULFATE 1 AMPULE: .5; 3 SOLUTION RESPIRATORY (INHALATION) at 10:05

## 2021-03-22 RX ADMIN — FUROSEMIDE 40 MG: 10 INJECTION, SOLUTION INTRAMUSCULAR; INTRAVENOUS at 16:49

## 2021-03-22 RX ADMIN — SODIUM CHLORIDE, PRESERVATIVE FREE 10 ML: 5 INJECTION INTRAVENOUS at 08:23

## 2021-03-22 RX ADMIN — FERROUS SULFATE TAB 325 MG (65 MG ELEMENTAL FE) 325 MG: 325 (65 FE) TAB at 08:18

## 2021-03-22 RX ADMIN — AMIODARONE HYDROCHLORIDE 200 MG: 200 TABLET ORAL at 20:49

## 2021-03-22 RX ADMIN — FOLIC ACID 1 MG: 1 TABLET ORAL at 08:18

## 2021-03-22 RX ADMIN — NIFEDIPINE 60 MG: 60 TABLET, EXTENDED RELEASE ORAL at 08:18

## 2021-03-22 RX ADMIN — TAMSULOSIN HYDROCHLORIDE 0.4 MG: 0.4 CAPSULE ORAL at 20:49

## 2021-03-22 RX ADMIN — ARFORMOTEROL TARTRATE 15 MCG: 15 SOLUTION RESPIRATORY (INHALATION) at 18:48

## 2021-03-22 ASSESSMENT — PAIN SCALES - GENERAL
PAINLEVEL_OUTOF10: 0

## 2021-03-22 NOTE — PROGRESS NOTES
Nutrition Assessment     Type and Reason for Visit: Reassess    Nutrition Recommendations/Plan: Continue current POC. Nutrition Assessment:  Pt remains adequately nourished AEB adequate po intake (% most meals) and stable wt since admission. Continue current POC. Malnutrition Assessment:  Malnutrition Status: No malnutrition    Nutrition Related Findings: + 1 generalized, + 1 RUE, +2 LUE with weeping, and + 2 BLE edema      Current Nutrition Therapies:    DIET CARDIAC; Anthropometric Measures:  · Height: 6' (182.9 cm)  · Current Body Wt: 250 lb (113.4 kg)   · BMI: 33.9    Nutrition Interventions:   Food and/or Nutrient Delivery:  Continue Current Diet  Coordination of Nutrition Care:  Continue to monitor while inpatient    Goals:  Pt will consume 50% or greater of meals.        Nutrition Monitoring and Evaluation:   Food/Nutrient Intake Outcomes:  Food and Nutrient Intake  Physical Signs/Symptoms Outcomes:  Biochemical Data, Nutrition Focused Physical Findings, Weight, Fluid Status or Edema, Skin     Discharge Planning:    Continue current diet     Electronically signed by Joann Linares, MS, RD, LD on 3/22/21 at 1:33 PM CDT    Contact: [phone]

## 2021-03-22 NOTE — PROGRESS NOTES
PROGRESS NOTE    Pt Name: Lucrecia Mello  MRN: [id number]  YOB: 1940  Date of evaluation: 3/22/2021      Subjective Feels more short of breath this morning. Feels tired. Discussed with bedside RN. O2 supplement nasal cannula 2 L/min. Mr. Eugene Marti is a very pleasant 78-year-old  gentleman referred by Dr. Melo Becker on the rehab unit with worsening thrombocytopenia and anemia for hematology opinion.     HEMATOLOGY HISTORY: Anemia and thrombocytopenia  [address] was seen as an inpatient at [facility] on the rehab unit on 3/19/2021 at the request of Dr. Melo Becker with worsening thrombocytopenia and anemia.     Rick Hidalgo has significant comorbid medical problems including morbid obesity and COPD requiring multiple admissions for respiratory distress and problems. He required intubation in January 2021.     Rick Hidalgo was admitted to [address] [place] [address] 3/6/2021, treated for acute on chronic respiratory failure, COPD exacerbation and possible recurrent pneumonia, followed by Dr. Marisela Richards. Swallowing evaluation was negative for signs or symptoms of aspiration. He was also treated for A. fib with RVR during hospitalization. Cardiac catheterization 3/10/2021 by Dr. Zora March revealed 100% occlusion of the right coronary artery, which appeared chronic with collateralization. Pacemaker was placed 3/13/2021. He was admitted to rehab 3/15/2021 for strengthening with anticipation to return home.     Ahswin developed worsening thrombocytopenia and anemia. Thrombocytopenia is chronic, dating to at least 6/2017.    Platelets have ranged from 85,000 (11/2018) to a high of 228,000 (1/17/2021).   Intermittent anemia also dates to 6/2017.     Review of CBCs:             Labs 3/15/2021:  · Iron: 58, TIBC 172, iron saturation 32%, ferritin 135  · Folate: 3.6  · Vitamin B12: 377  · TSH: 3.24  · Vitamin D: 28.5  · CMP 3/19/2021: Creatinine 1.9/GFR 34, calcium 8.3, total protein 4.6     Ashwin is treated with ferrous sulfate 325 mg po BID and folic acid 1 mg po daily. He received Venofer 100 mg IV daily x3 (3/17/2021 - 3/19/2021). He is a former smoker and drinks 2 glasses of wine daily and an occasional beer.     Hematology consultation requested       REVIEW OF SYSTEMS:   CONSTITUTIONAL: no fever, no night sweats,  fatigue; activity change  HEENT: no blurring of vision, no double vision, no hearing difficulty, no tinnitus, no ulceration, no dysplasia, no epistaxis;  LUNGS: no cough, no hemoptysis, no wheeze,   shortness of breath;  CARDIOVASCULAR: no palpitation, no chest pain,  shortness of breath;  GI: no abdominal pain, no nausea, no vomiting, no diarrhea, no constipation;  KASANDRA: no dysuria, no hematuria, no frequency or urgency, no nephrolithiasis;  MUSCULOSKELETAL: joint pain, no swelling, no stiffness;  ENDOCRINE: no polyuria, no polydipsia, no cold or heat intolerance;  HEMATOLOGY: no easy bruising or bleeding, no history of clotting disorder;  DERMATOLOGY: Easy bruising, no eczema, no pruritus;  PSYCHIATRY: no depression, no anxiety, no panic attacks, no suicidal ideation, no homicidal ideation;  NEUROLOGY: no syncope, no seizures, no numbness or tingling of hands, no numbness or tingling of feet, no paresis;    Objective   BP (!) 102/58   Pulse 63   Temp 98.2 °F (36.8 °C) (Temporal)   Resp 20   Ht 6' (1.829 m)   Wt 250 lb (113.4 kg)   SpO2 91%   BMI 33.91 kg/m²     PHYSICAL EXAM:  CONSTITUTIONAL: Alert, appropriate, no acute distress, chronically ill  EYES: Non icteric, EOM intact, pupils equal round   ENT: Mucus membranes moist, no oral pharyngeal lesions, external inspection of ears and nose are normal  NECK: Supple, no masses. No palpable thyroid mass  CHEST/LUNGS: Decreased breath sounds bilateral lung base  CARDIOVASCULAR: RRR, no murmurs. No lower extremity edema  ABDOMEN: soft non-tender, active bowel sounds, no HSM.   No palpable masses  EXTREMITIES: warm, full ROM in all 4 extremities, no focal weakness. SKIN: Bruises, warm, dry with no rashes or lesions  LYMPH: No cervical, clavicular, axillary, or inguinal lymphadenopathy  NEUROLOGIC: follows commands, non focal   PSYCH: mood and affect appropriate. Alert and oriented to time, place, person    LABORATORY RESULTS REVIEWED BY ME:  Recent Labs     03/21/21  0436 03/20/21 1953 03/20/21  0419 03/19/21 0432   WBC 8.9  --  7.1 8.2   HGB 8.0* 8.1* 6.7* 7.0*   HCT 26.0* 25.6* 22.3* 23.4*   MCV 95.6*  --  97.4* 98.7*   *  --  115* 88*       Lab Results   Component Value Date     03/19/2021    K 4.8 03/19/2021     03/19/2021    CO2 28 03/19/2021    BUN 47 (H) 03/19/2021    CREATININE 1.9 (H) 03/19/2021    GLUCOSE 137 (H) 03/19/2021    CALCIUM 8.3 (L) 03/19/2021    PROT 4.6 (L) 03/19/2021    LABALBU 3.1 (L) 03/19/2021    BILITOT 0.6 03/19/2021    ALKPHOS 81 03/19/2021    AST 13 03/19/2021    ALT 14 03/19/2021    LABGLOM 34 (A) 03/19/2021    GFRAA 42 (L) 03/19/2021       Lab Results   Component Value Date    INR 1.11 03/15/2021    INR 1.13 03/06/2021    INR 1.16 01/21/2021    PROTIME 14.3 03/15/2021    PROTIME 14.4 03/06/2021    PROTIME 14.8 (H) 01/21/2021       RADIOLOGY STUDIES REVIEWED BY ME:  Echo Complete 2d W Doppler W Color    Result Date: 3/8/2021  Transthoracic Echocardiography Report (TTE)  Demographics   Patient Name  Denita Blunt  Date of Study         03/08/2021                E   MRN           [id number]            Gender                Male   Date of Birth 1940        Room Number           MHL-0145   Age           79 year(s)   Height:       72 inches         Referring Physician   Roly Choudhury   Weight:       237.01 pounds     Sonographer           PATRICE Garzon   BSA:          2.29 m^2          Interpreting          Andriy Loo MD                                  Physician   BMI:          32.14 kg/m^2  Procedure Type of Study   TTE procedure:ECHO 2D W/DOPPLER/CONTRAST LIMITD.   Study Location: Portable Technical Quality: Adequate visualization Patient Status: Inpatient Contrast Medium: Definity. Amount - 3 ml BP: 129/71 mmHg  Conclusions   Summary  Limited echocardiographic study. LV appears normal in size with preserved LV systolic function. LV ejection  fraction estimated at 60-65%. RV not well visualized but appears normal in size with preserved systolic  function. .       Vl Extremity Venous Left    Result Date: 3/19/2021  Vascular Upper Extremities Veins Procedure  Demographics   Patient Name   Uriah Lange Age                 79                 E   Patient Number [id number]           Gender              Male   Visit Number   [id number]        Interpreting        Jossy Carbone MD                                  Physician   Date of Birth  1940       Referring Physician Tiago Sood   Accession      [id number]       [address] [place] [address], [facility] [[email or web address]],  Number                                              RDMS  Procedure Type of Study:   Veins:Upper Extremities Veins, US Doppler Venous Upper Extremity  Unilateral.  Indications for Study:Edema, left upper extremity and Pain, left upper extremity. Allergies   - Other allergy:(Eliquis). - Phenergan. Technical Quality:Limited visualization due to edema. Impression   There is no evidence of deep vein thrombosis (DVT) nor superficial  thrombophlebitis (SVT) of the left upper extremity(ies). Xr Chest Portable    Result Date: 3/13/2021  XR CHEST PORTABLE 3/13/2021 12:11 PM HISTORY:   Pacemaker placement  Single view. COMPARISONS:  3/11/2021 chest radiography FINDINGS: Left-sided permanent cardiac pacemaker identified with atrioventricular leads apparently well-positioned. There are no pneumothoraces or pacemaker complication findings. Again noted are bilateral perihilar and lower lobe infiltrates greater on the right with pleural effusions. Left-sided pacemaker placed without pneumothorax or postprocedural complications.  Signed by Dr Simone Guevara on 3/13/2021 adjustment of the mA and/or KV according to patient size. Findings: No evidence of pulmonary embolus. Main pulmonary artery is upper limits of normal in caliber. Thoracic aorta is nonaneurysmal. Atherosclerotic change in the aortic arch and coronary arteries noted. No pericardial effusion. Central airways are clear. Interlobular septal thickening. Slight interval worsening of multifocal bilateral consolidation and groundglass opacity. Disease burden is greatest in the RIGHT upper lobe. Small RIGHT greater than LEFT pleural effusions with adjacent atelectasis. No pneumothorax. Mild mediastinal lymphadenopathy similar to prior and likely reactive. 1.3 cm RIGHT thyroid nodule. No acute chest wall soft tissue abnormality. No acute osseous finding. Thoracic spine is scoliotic curvature and degenerative change. No acute osseous finding. Impression: 1. No evidence of pulmonary embolus. 2.  Slight progression of bilateral RIGHT greater than LEFT consolidation, suspicious for pneumonia. 3.  Small bilateral pleural effusions and interstitial pulmonary edema. Signed by Dr Zeeshan Sanchez on 3/6/2021 1:56 PM    Us Spleen    Result Date: 3/20/2021  US SPLEEN 3/20/2021 12:07 PM History: Thrombocytopenia. Grayscale and color flow ultrasound evaluation of the spleen shows no abnormality. Spleen = 111 x 36 x 130 mm. No free fluid is seen at the left upper quadrant. 1. Normal size spleen. Signed by Dr Erin Tenorio on 3/20/2021 12:07 PM    ASSESSMENT/PLAN:  #Thrombocytopenia  Review of prior CBCs in the system showed episode of thrombocytopenia back in 2018. Subsequently his CBC showed normal platelets. Platelet counts were also low in February 2021 and then they normalized before dropping again. Lowest platelet counts 31,620 on 3/19/2021. Platelet counts 516,993 today.     Additional serology drawn on 3/19/2021:  · IPF: 11.9% (H) - suggesting a functioning marrow  · Copper:  In process  · Zinc: In process  · RELL: In process  · antiplatelet antibody: In process  · Hepatitis ABC: Nonreactive  · HIV rapid 1&2: non-reactive   · SPEP: In process  · Ig (L), IgA 133, IgM 150  · Kappa light chains: In process  · Erythropoietin: In process     ultrasound spleen 3/20/2021: normal spleen size of 11.1 by 3.6 x 13.0 cm     Pharmacy reviewed medications from 3/6/21 inpatient admission and 3/15/21 rehab admission for possible contributors for thrombocytopenia. The following medications that the patient has received dose(s) of have thrombocytopenia listed as a possible adverse reaction:  Amiodarone  Atorvastatin  Bumetanide  Cefazolin  Cefuroxime  Digoxin  Diltiazem  Doxycycline  Enoxaparin  Furosemide  Levofloxacin  Meropenem  Metoprolol  Nifedipine  Pantoprazole  Vancomycin     #multifactorial anemia, iron/B12/folate deficiency, anemia of chronic disease, possibly CKD associated, multiple lab draws  Hgb 8.0/MCV 95.6 (status post 1 unit PRBC 3/12/2021 & 3/20/2021)   3/15/2021-ferritin 135/iron saturation 34%  Iron 58, TIBC 172, folate 3.6, vitamin B12 377  Status post Venofer 100 mg daily x3, completed 3/19/2021  Venofer 400 mg IV given 3/20/2021, 300 mg IV due today, 3/21/2021 (total 1 g)  Continue oral iron  Continue folate replacement  Last colonoscopy 2017 documented a hyperplastic sigmoid colon polypectomy  Check stool for OB - specimen needs to be collected  GI consulted-no recommendations for colonoscopy. Last colonoscopy .       #Respiratory Failure, h/o CHF/COPD  Supplemental O2 3 L/min  As per attending  Consider chest x-ray and pulmonary consult     Generalized weakness  PT/OT ongoing     #Recent A. Fib with RVR/pacemaker  As per cardiology     #History of bladder cancer  Followed by Dr. Kalia SLADE 3/17/2021 trace blood  Records being retrieved    #Easy bruising-PT was normal on 3/15/2021  Patient is on aspirin.   Repeat PT/PTT tomorrow    Julianne Crawford MD    21  7:16 AM

## 2021-03-22 NOTE — PROGRESS NOTES
03/22/21 1430   Restrictions/Precautions   Restrictions/Precautions Surgical Protocols; Fall Risk   Implants present? Pacemaker   Pain Assessment   Pain Assessment 0-10   Pain Level 0   Patient's Stated Pain Goal No pain   Bed Mobility   Sit to Supine Contact guard assistance;Minimal assistance  (BLE ASSIST )   Transfers   Sit to Stand Minimal Assistance   Stand Pivot Transfers Contact guard assistance;Minimal Assistance  (TO RIGHT FROM WC TO BED )   Other exercises   Other exercises 1 SEATED HIP ADD BALL SQUEEZE X20    Other exercises 2 BLE LE KNEE EXT X20    Other exercises 3 SEATED HIP MARCH X20 B   Other exercises 4 SEATED HIP ABDUCTION X20    Conditions Requiring Skilled Therapeutic Intervention   Body structures, Functions, Activity limitations Decreased functional mobility ; Decreased ADL status; Decreased endurance;Decreased posture   Assessment PT WAS ABLE TO TOLERATE SEATED LE EXERCISES THIS AFTERNOON WITHOUT EXACERBATION OF SOB. CONTINUES TO REQUIRE PROLONGED REST BREAKS DUE TO POOR ENDURANCE. CONTINUED SKILLED PT TO ADDRESS ENDURANCE DEFICITS AND IMPROVE LE STRENGTH FOR CARRYOVER INTO EFFICIENT AMBULATION. Activity Tolerance   Activity Tolerance Patient Tolerated treatment well;Patient limited by endurance   Safety Devices   Type of devices All fall risk precautions in place;Call light within reach; Bed alarm in place; Left in bed  (WITH WIFE, BUE ELEVATED W/ PILLOWS, SCD ON )

## 2021-03-22 NOTE — CONSULTS
[facility] Medicine Consult      Patient:  Tab Friend  MRN: [id number]    CHIEF COMPLAINT:  Dyspnea    History Obtained From: Patient and EMR    PCP: Jose Mas MD    HISTORY OF PRESENT ILLNESS:   79 y.o. obese male with a past medical history of COPD, PVD, CAD, HFpEF, HLD, CKD stage III, diabetes, hypertension, BPH, h/o bladder CA, atrial fibrillation with symptomatic bradycardia s/p PPM who is currently on inpatient rehabilitation with complaints of worsening dyspnea noted to have pulmonary edema on chest x-ray today. Patient is known to me from previous admission. Patient originally admitted earlier this month for acute on chronic respiratory failure and noted to have bilateral pleural effusions and atrial fibrillation with pulmonology and cardiology on board. Patient is status post pacemaker during that admission for symptomatic bradycardia. Patient is also being seen by gastroenterology and hematology for anemia and thrombocytopenia. Patient seen and examined today working with occupational therapy with oxygen in place. Patient states he has been having worsening dyspnea for the last couple days but seems to be somewhat improved after receiving IV Lasix today with pulmonology. Patient otherwise denies pain. Patient denies chest pain, nausea, vomiting, diarrhea, constipation, fevers, chills, sweats, dysuria, cough. REVIEW OF SYSTEMS:    Review of Systems    All other 14 systems reviewed and negative except as noted above.     Past Medical History:      Diagnosis Date    Acute liver failure without hepatic coma 10/23/2018    Back pain     \"with tired legs as a result\"    Bladder cancer ([facility] [address] 12/19/2018    Blood circulation, collateral     Carotid arterial disease (HCC)     recent surgery    CKD (chronic kidney disease), 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/2018    COPD with acute lower respiratory infection ([address] 2/24/2021    GERD (gastroesophageal reflux disease)     Hyperlipidemia     Hypertension     Hypertension     Palliative care patient 10/23/2018    Pneumonia due to infectious organism 11/06/2018    Primary osteoarthritis of left knee 10/14/2018    PVD (peripheral vascular disease) (HCC)     Tremor     Tremor on Right side x 1-2 weeks per stepdaughter    Type 2 diabetes mellitus with complication, without long-term current use of insulin ([facility] [address] 1/21/2021       Past Surgical History:      Procedure Laterality Date    BACK SURGERY      COLONOSCOPY  2007?  CYSTOSCOPY Bilateral 12/19/2018    CYSTOSCOPY, BIOSPY FULGURATION OF BLADDER TUMOR POSSIBLE TURBT, RETROGRADE PYELOGRAM performed by Chance Gibbons MD at [facility] COLONOSCOPY FLX DX W/COLLJ SPEC WHEN PFRMD N/A 9/11/2017    Dr Santos Jasmine internal hemorrhoids, diverticular disease-HP-No recall (age)   Jeet Andres SD REVISE MEDIAN N/CARPAL TUNNEL SURG Left 7/18/2018    OPEN CARPAL TUNNEL RELEASE performed by Beth Condon MD at [address] Left 8/28/2018    LEFT CAROTID ENDARTERECTOMY WITH VEIN PATCH ANGIOPLASTY AND COMPLETION ANGIOGRAM performed by Toña Medellin MD at [place] [address] Left 10/15/2018    LEFT COMPLEX TOTAL KNEE ARTHROPLASTY performed by Beth Condon MD at [facility] VASCULAR SURGERY  04/21/2015    Kana BESS Ultrasound guided access of left common femoral artery. Aortogram.Diagnostic right lower extremity arteriogram.Radiologic supervision and interpretation.  VASCULAR SURGERY  01/13/2015    Kana Mirza M.D Atherectomy,angioplasty,and stenting of left superficial femoral artery.  VASCULAR SURGERY  03/11/2014    Kana Mirza M.D. Ultrasound-guided access of right common femoral artery. Aortogram.Left lower extremity arteriogram.Atherectomy and angioplasty of left superficial femoral artery. Radiologic supervision and interpretation.     VASCULAR SURGERY  01/18/2013    Kana Mirza M. D.Aortogram.Multistation arteriogram right lower extremity. Laser atherectomy and angioplasty of right superficial femoral artery. Selective catheterization of right tibioperoneal trunk. Angioplasty of peroneal artery and tibioperoneal trunk.  VASCULAR SURGERY  10/30/2018    [facility]. Ultrasound guided cannulation of right internal vein. Placement of right internal jugular vein tunneled dialysis catheter bard equistream xk 23cm tip to cuff    VASCULAR SURGERY  12/17/2018    [facility]. Removal of tunneled dilaysis catheter right internal jugular vein. Medications Prior to Admission:    Prior to Admission medications    Medication Sig Start Date End Date Taking?  Authorizing Provider   calcium carbonate (TUMS) 500 MG chewable tablet Take 1 tablet by mouth 3 times daily as needed for Heartburn 2/24/21 3/26/21  Rebecca Balling, DO   guaiFENesin (ROBITUSSIN) 100 MG/5ML syrup Take 10 mLs by mouth every 4 hours as needed for Cough 2/24/21   Rebecca Balling, DO   famotidine (PEPCID) 20 MG tablet Take 1 tablet by mouth daily 2/24/21   [place] Balling, DO   aspirin 81 MG EC tablet Take 1 tablet by mouth daily 1/30/21   Dennis Francisco MD   glipiZIDE (GLUCOTROL) 5 MG tablet Take 1 tablet by mouth every morning (before breakfast) 1/30/21   Dennis Francisco MD   atorvastatin (LIPITOR) 20 MG tablet Take 1 tablet by mouth daily 1/29/21   Dennis Francisco MD   metoprolol succinate (TOPROL XL) 100 MG extended release tablet Take 1 tablet by mouth daily 1/30/21   Dennis Francisco MD   NIFEdipine (ADALAT CC) 60 MG extended release tablet Take 1 tablet by mouth daily 1/30/21   Dennis Francisco MD   tamsulosin (FLOMAX) 0.4 MG capsule Take 1 capsule by mouth daily 1/30/21   Dennis Francisco MD   pantoprazole (PROTONIX) 40 MG tablet Take 1 tablet by mouth daily 1/30/21   Dennis Francisco MD   trospium (SANCTURA) 20 MG tablet Take 1 tablet by mouth nightly 1/29/21   Dennis Francisco MD       Allergies:  Eliquis [apixaban] and Promethazine hcl    Social History:   TOBACCO: reports that he quit smoking about 17 years ago. He has never used smokeless tobacco.  ETOH:   reports current alcohol use of about 12.0 standard drinks of alcohol per week. Family History:       Problem Relation Age of Onset    Colon Cancer Father     Diabetes Brother     Colon Polyps Neg Hx     Liver Cancer Neg Hx     Liver Disease Neg Hx     Esophageal Cancer Neg Hx     Rectal Cancer Neg Hx     Stomach Cancer Neg Hx            Physical Exam:      Vitals: /62   Pulse 72   Temp 98.3 °F (36.8 °C) (Temporal)   Resp 20   Ht 6' (1.829 m)   Wt 250 lb (113.4 kg)   SpO2 92%   BMI 33.91 kg/m²   24HR INTAKE/OUTPUT:      Intake/Output Summary (Last 24 hours) at 3/22/2021 1500  Last data filed at 3/22/2021 1350  Gross per 24 hour   Intake 960 ml   Output 300 ml   Net 660 ml       General appearance: Alert    HEENT:  AT/NC  Lungs: BLAE, diminished breath sounds, +crackles, no wheezing appreciated  Heart: RRR  Vascular: +pulses  Abdomen: BS+, soft, NT, ND  Extremities: +edema  Neurologic: Alert, gross motor function intact  Psychiatric: calm, mood appropriate. Skin: warm, multiple bruises.     CBC:   Recent Labs     03/20/21  0419 03/20/21  1953 03/21/21  0436 03/22/21  0654   WBC 7.1  --  8.9 8.4   HGB 6.7* 8.1* 8.0* 7.9*   *  --  101* 99*     BMP:    Recent Labs     03/22/21  0654      K 5.2*      CO2 27   BUN 45*   CREATININE 2.0*   GLUCOSE 166*     CMP:   Recent Labs     03/22/21  0654      K 5.2*      CO2 27   BUN 45*   CREATININE 2.0*   GLUCOSE 166*   CALCIUM 7.8*   BILITOT 0.6   ALKPHOS 75   AST 16   ALT 16     Hepatic:   Recent Labs     03/22/21  0654   AST 16   ALT 16   BILITOT 0.6   ALKPHOS 75       WBC:   Recent Labs     03/22/21  0654   WBC 8.4        Creatinine:   Recent Labs     03/22/21  0654   CREATININE 2.0*      LFTs:   Recent Labs     03/22/21  0654   AST 16   ALT 16   BILITOT 0.6   ALKPHOS 75 -----------------------------------------------------------------    Imaging Studies:    XR CHEST (2 VW)   Final Result   Impression:   Fluid overload. Signed by Dr Kunal Mesa on 3/22/2021 9:34 AM      US SPLEEN   Final Result   1. Normal size spleen. Signed by Dr Erin Tenorio on 3/20/2021 12:07 PM      VL Extremity Venous Left   Final Result          Assessment     Principal Problem:    Acute on chronic respiratory failure [facility])  Active Problems:    Bilateral carotid artery stenosis    Atherosclerosis of native artery of both lower extremities with intermittent claudication (HCC)    Primary osteoarthritis of left knee    Arthritis of knee    Essential hypertension    Pure hypercholesterolemia    Iron deficiency anemia    GERD (gastroesophageal reflux disease)    CHF (congestive heart failure) (HCC)    Thrombocytopenia (HCC)    History of bladder cancer    Acute respiratory failure (HCC)    Chronic midline low back pain without sciatica    Chronic kidney disease    Atrial fibrillation (HCC)    Vitamin D deficiency    Anemia    Alcohol use    Pacemaker  Resolved Problems:    * No resolved hospital problems. *      Plan     Dyspnea/Pulmonary edema: Has gotten some relief with lasix given earlier today. Continue lasix BID. Monitor I's/O's. Monitor BMP. Bronchodilators. O2 PRN - wean as tolerated. Pulmonology also on board.     Signed:  Valeria Tobin MD 3/22/2021 3:00 PM   Consulting Hospitalist

## 2021-03-22 NOTE — PROGRESS NOTES
Occupational Therapy  Facility/Department: [facility] 8 REHAB UNIT  Daily Treatment Note  NAME: Abbie Land  : 1940  MRN: [id number]    Date of Service: 3/22/2021    Discharge Recommendations:  Home with Home health OT       Assessment   Performance deficits / Impairments: Decreased functional mobility ; Decreased endurance;Decreased strength;Decreased ROM; Decreased balance;Decreased ADL status; Decreased high-level IADLs  Treatment Diagnosis: Pneumonia, recent pacemaker (3-13-21)  Activity Tolerance  Activity Tolerance: Patient limited by fatigue  Activity Tolerance: Pt states that he is more SOB, agreed to BUE exercises and retrograde massage. Safety Devices  Safety Devices in place: Yes(Left with PT)  Type of devices: Left in chair         Patient Diagnosis(es): There were no encounter diagnoses. has a past medical history of Acute liver failure without hepatic coma, Back pain, Bladder cancer (HCC), Blood circulation, collateral, Carotid arterial disease ([address] CKD (chronic kidney disease), stage II, COPD with acute lower respiratory infection ([address] GERD (gastroesophageal reflux disease), Hyperlipidemia, Hypertension, Hypertension, Palliative care patient, Pneumonia due to infectious organism, Primary osteoarthritis of left knee, PVD (peripheral vascular disease) ([address] Tremor, and Type 2 diabetes mellitus with complication, without long-term current use of insulin ([address] has a past surgical history that includes back surgery;  Tonsillectomy and adenoidectomy; Colonoscopy (?); pr colonoscopy flx dx w/collj spec when pfrmd (N/A, 2017); pr revise median n/carpal tunnel surg (Left, 2018); pr thromboendartectmy neck,neck incis (Left, 2018); vascular surgery (2015); vascular surgery (2015); vascular surgery (2014); vascular surgery (2013); pr total knee arthroplasty (Left, 10/15/2018); vascular surgery (10/30/2018); vascular surgery (2018); and Cystoscopy (Bilateral, 12/19/2018). Restrictions  Restrictions/Precautions  Restrictions/Precautions: Surgical Protocols, Fall Risk  Implants present? : Pacemaker  Position Activity Restriction  Other position/activity restrictions: PACEMAKER RESTRICTIONS TO LUE        Objective           Upper Extremity Function  UE AROM: Retrograde massage with hand, wrist and elbow pumps due to BUE severe edema.   Type of ROM/Therapeutic Exercise  Type of ROM/Therapeutic Exercise: Free weights;AROM  Comment: BUE: 2# distally, AROM proximally 2 sets of 10, hand and wrist pumps with retrograde massage 20 reps     Plan   Plan  Specific instructions for Next Treatment: AE, endurance training  Current Treatment Recommendations: Strengthening, Patient/Caregiver Education & Training, Home Management Training, Equipment Evaluation, Education, & procurement, Balance Training, Self-Care / ADL, Safety Education & Training, Endurance Training, Functional Mobility Training          Goals  Short term goals  Time Frame for Short term goals: 1 week  Short term goal 1: pt will complete LB dressing with AE prn with CGA  Short term goal 2: pt will complete overall toileting with CGA  Short term goal 3: pt will complete simple ambulatory home making task with CGA while maintaining pacemaker precautions  Short term goal 4: pt will complete overall bathing with CGA  Short term goal 5: pt will complete 1-2 handed static standing act for 2 mins with supervision  Short term goal 6: pt will complete HEP x 5 occasions within range of pacemaker precautions to improve strength and endurance for ADLs  Long term goals  Time Frame for Long term goals : 2 weeks  Long term goal 1: pt will complete overall dressing with modified independence  Long term goal 2: pt will complete overall toileting with modified independence  Long term goal 3: pt will complete overall bathing with modified independence  Long term goal 4: pt will complete simple ambulatory home making task with modified independence  Long term goal 5: pt will complete HEP with independence  Long term goals 6: pt verbalize DME for home       Therapy Time   Individual Concurrent Group Co-treatment   Time In 1345         Time Out 1430         Minutes 45         Timed Code Treatment Minutes: [address], OT

## 2021-03-22 NOTE — PROGRESS NOTES
Luanne Goldmann  [id number]     03/22/21 1003 03/22/21 1004   Subjective   Subjective Pt reports that he is more fatigued this morning and having trouble breathing. Pt's O2 has been increased to 3L by nursing. --    Bed Mobility   Rolling Stand by assistance  --    Supine to Sit Stand by assistance  --    Sit to Supine Stand by assistance  --    Transfers   Sit to Stand  --  Minimal Assistance   Stand to sit  --  Minimal Assistance   Bed to Chair  --  Minimal assistance   Ambulation   Ambulation?  --  No   Stairs/Curb   Stairs? --  No   Wheelchair Activities   Wheelchair Parts Management  --  No   Propulsion  --  No   Exercises   Comments  --  Assisted Pt w/ dressing, hygiene and ADL's in bathroom. Conditions Requiring Skilled Therapeutic Intervention   Body structures, Functions, Activity limitations  --  Decreased functional mobility ; Decreased ADL status; Decreased endurance;Decreased posture   Assessment  --  Pt was more fatigued this morning and O2 has been increased to 3L. Pt required Min A-Mod A during Tf's. Pt also needed several rest breaks during bed mobility and ADL's due to SOB. Pt missed 15 minutes of therapy due to having chest x-ray performed.    Prognosis  --  Fair   Activity Tolerance   Activity Tolerance  --  Patient limited by fatigue;Patient limited by endurance;Treatment limited secondary to medical complications (free text)   Electronically signed by Bruce Robles PTA on 3/22/2021 at 10:09 AM

## 2021-03-22 NOTE — PATIENT CARE CONFERENCE
[facility] ACUTE INPATIENT REHABILITATION  TEAM CONFERENCE NOTE    Date: 3/22/2021  Patient Name: Odell Castro        MRN: [id number]    : 1940  (79 y.o.)  Gender: male      Diagnosis: PACEMAKER PLACEMENT, CHRONIC HYPOXIA AND HYPERCAPNIA       PHYSICAL THERAPY  STRENGTH  Strength RLE  Strength RLE: WFL  Strength LLE  Strength LLE: WFL  ROM  AROM RLE (degrees)  RLE AROM: WFL  AROM LLE (degrees)  LLE AROM : WFL  BED MOBILITY  Bed Mobility  Bridging: Stand by assistance  Rolling: Stand by assistance  Supine to Sit: Stand by assistance  Sit to Supine: Stand by assistance  Comment: REQUIRED INCREASED REST BREAKS DUE TO POOR ENDURANCE AND SOB   TRANSFERS  Transfers  Sit to Stand: Minimal Assistance  Stand to sit: Minimal Assistance  Bed to Chair: Minimal assistance  Comment: VCS FOR HAND PLACEMENT. REQUIRED INCREASED REST BREAKS DUE TO POOR ENDURANCE AND SOB   WHEELCHAIR PROPULSION  Propulsion 1  Propulsion: Manual  Level: Level Tile  Method: CARLITO HERNÁNDEZ  Level of Assistance: Stand by assistance  Description/ Details: 48'  Distance: Pt was able to increase distance this afternoon. Pt kept veering to R side needing cues to correct. AMBULATION  Ambulation 1  Surface: level tile  Device: Rolling Walker  Other Apparatus: O2(2L)  Assistance: Contact guard assistance  Quality of Gait: Practiced turning and sitting this afternoon during amb. Pt performed turns well, but fatigued quickly.   Distance: 20'x3  STAIRS     GOALS:  Short term goals  Time Frame for Short term goals: 1 WEEK   Short term goal 1: SUPERVISION BED MOBILITY   Short term goal 2: SUPEVISION STS TF W/ RW   Short term goal 3: CGA 50 FT AMBULATION WITH RW   Short term goal 4: CGA CAR TF WITH RW   Short term goal 5: SUPERVISION HEP     Long term goals  Time Frame for Long term goals : 2-3 WEEKS   Long term goal 1: INDEPENDENT BED MOBILITY   Long term goal 2: INDEPENDENT STS TF   Long term goal 3: SUPERVISION 100 FT AMBULATION RW FOR SAFETY DUE TO and endurance for ADLs    LTGs:  Long term goals  Time Frame for Long term goals : 2 weeks  Long term goal 1: pt will complete overall dressing with modified independence  Long term goal 2: pt will complete overall toileting with modified independence  Long term goal 3: pt will complete overall bathing with modified independence  Long term goal 4: pt will complete simple ambulatory home making task with modified independence  Long term goal 5: pt will complete HEP with independence  Long term goals 6: pt verbalize DME for home    Assessment:  Performance deficits / Impairments: Decreased functional mobility , Decreased endurance, Decreased strength, Decreased ROM, Decreased balance, Decreased ADL status, Decreased high-level IADLs                  NUTRITION  Current Wt: Weight: 250 lb (113.4 kg) / Body mass index is 33.91 kg/m². Admission Wt: Admission Body Weight: 250 lb (113.4 kg)  Oral Diet Orders: Cardiac    Pt remains adequately nourished AEB adequate po intake (% most meals) and stable wt since admission. Continue current POC. Please see nutrition notes for further details. NURSING    Wounds/Incisions/Ulcers: bruises, skin weeping at times, buttocks (right) moisture associated skin damage with sheering-wash with soap and water, mepilex   Ángel Scale Score: 17    Pain: No pain concerns to address    Consultations/Labs/X-rays: chest x-ray 3/22, Pulmonologist    Family Education: training for independence in self care    Fall Risk:  Woodard Score: 61    Fall in the last week? Other Nursing Issues:         SOCIAL WORK/CASE MANAGEMENT  Assessment: - wife is retired nurse and seem knowledgeable but has severe short term memory deficits-unable to make decisions; He is frustrated -not understanding how he was treated several times for pneumonia then it has become something else not not easily solvable-wants to go somewhere to find out whats wrong with him.     Discharge Plan   Estimated Length

## 2021-03-22 NOTE — CONSULTS
Pulmonary and Critical Care Consult Note    Tamela Alvarado    MRN# [id number]    Acct# [de-identified]  3/22/2021   3:34 PM CDT    Referring Obdulio Summers MD      Chief Complaint: Shortness of breath. Requesting physician:  Dr. Kiara Escobar. Reason for consult: Shortness of air worsening. HPI: We have been consulted to see this 79y.o. year old male born on 1940. The patient was seen recently with similar complaint. He is known to have congestive heart failure. He recently underwent pacemaker placement. He had recurrent episodes of shortness of breath and acute on chronic respiratory failure with hypoxia. Chest x-ray is usually show pulmonary vascular congestion and pleural effusions. After discharge from the hospital he was admitted to rehab. He has been getting progressively more short of breath. He apparently received blood transfusions and IV fluids within the past 2 days by his report. He has not been on any diuretics. Chest x-ray done today to evaluate progressive shortness of breath. The chest x-ray showed worsening pulmonary edema and pleural effusions. I was asked to see him regarding the above. He was given diuretics today. He feels that his breathing is slightly better. Continues to be on oxygen.       Past Medical History      Past Medical History:   Diagnosis Date    Acute liver failure without hepatic coma 10/23/2018    Back pain     \"with tired legs as a result\"    Bladder cancer ([facility] 12/19/2018    Blood circulation, collateral     Carotid arterial disease (HCC)     recent surgery    CKD (chronic kidney disease), 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/2018    COPD with acute lower respiratory infection ([address] 2/24/2021    GERD (gastroesophageal reflux disease)     Hyperlipidemia     Hypertension     Hypertension     Palliative care patient 10/23/2018    Pneumonia due to infectious organism 11/06/2018    Primary osteoarthritis of left knee 10/14/2018    PVD (peripheral vascular disease) ([facility]     Tremor     Tremor on Right side x 1-2 weeks per stepdaughter    Type 2 diabetes mellitus with complication, without long-term current use of insulin ([facility] 1/21/2021     SurgicalHistory  Past Surgical History:   Procedure Laterality Date    BACK SURGERY      COLONOSCOPY  2007?  CYSTOSCOPY Bilateral 12/19/2018    CYSTOSCOPY, BIOSPY FULGURATION OF BLADDER TUMOR POSSIBLE TURBT, RETROGRADE PYELOGRAM performed by Nicky Alvarez MD at [facility] COLONOSCOPY FLX DX W/COLLJ SPEC WHEN PFRMD N/A 9/11/2017    Dr Juliette Armijo internal hemorrhoids, diverticular disease-HP-No recall (age)   [de-identified] VT REVISE MEDIAN N/CARPAL TUNNEL SURG Left 7/18/2018    OPEN CARPAL TUNNEL RELEASE performed by Luis Estevez MD at [address] Left 8/28/2018    LEFT CAROTID ENDARTERECTOMY WITH VEIN PATCH ANGIOPLASTY AND COMPLETION ANGIOGRAM performed by David Dinero MD at [address] 10/15/2018    LEFT COMPLEX TOTAL KNEE ARTHROPLASTY performed by Luis Estevez MD at [facility] VASCULAR SURGERY  04/21/2015    Екатерина BESS Ultrasound guided access of left common femoral artery. Aortogram.Diagnostic right lower extremity arteriogram.Radiologic supervision and interpretation.  VASCULAR SURGERY  01/13/2015    Екатерина Neely M.D Atherectomy,angioplasty,and stenting of left superficial femoral artery.  VASCULAR SURGERY  03/11/2014    Екатерина Neely M.D. Ultrasound-guided access of right common femoral artery. Aortogram.Left lower extremity arteriogram.Atherectomy and angioplasty of left superficial femoral artery. Radiologic supervision and interpretation.  VASCULAR SURGERY  01/18/2013    Екатерина BESS Aortogram.Multistation arteriogram right lower extremity. Laser atherectomy and angioplasty of right superficial femoral artery. Selective catheterization of right tibioperoneal [address]  filed at 3/22/2021 1525  Gross per 24 hour   Intake 960 ml   Output 600 ml   Net 360 ml       General Appearance: Alert in no distress morbidly obese. ENT: normocephalic a traumatic. Pulmonary/Chest: Diminished BS bilaterally no rubs or tenderness or dullness to percussion. Cardiovascular: s1 s2 no murmurs. Abdomen: soft, non-tender, non-distended, normal bowel sounds, no masses or organomegaly  Extremities: edema bilateral upper ext. Neurologic: non focal.      Labs:  Recent Labs     03/20/21  0419 03/20/21 1953 03/21/21  0436 03/22/21  0654   WBC 7.1  --  8.9 8.4   RBC 2.29*  --  2.72* 2.69*   HGB 6.7* 8.1* 8.0* 7.9*   HCT 22.3* 25.6* 26.0* 25.6*   *  --  101* 99*   MCV 97.4*  --  95.6* 95.2*   MCH 29.3  --  29.4 29.4   MCHC 30.0*  --  30.8* 30.9*   RDW 17.7*  --  18.6* 18.2*      Recent Labs     03/22/21  0654      K 5.2*      CO2 27   BUN 45*   CREATININE 2.0*   CALCIUM 7.8*   GLUCOSE 166*      No results for input(s): PHART, YSM9JML, PO2ART, FWG2DYZ, P5MTTOWI, BEART in the last 72 hours. Recent Labs     03/22/21  0654   AST 16   ALT 16   ALKPHOS 75   BILITOT 0.6   CALCIUM 7.8*     No results for input(s): BC, LABGRAM, CULTRESP, BFCX in the last 72 hours. Radiograph: Xr Chest (2 Vw)    Result Date: 3/22/2021  Impression: Fluid overload. Signed by Dr Thomas Sepulveda on 3/22/2021 9:34 AM    Us Spleen    Result Date: 3/20/2021  1. Normal size spleen. Signed by Dr Brenda Giraldo on 3/20/2021 12:07 PM       My radiograph interpretation/independent review of imaging: reviewed. As above.    Problem list generated by [facility] Problems           Last Modified POA    * (Principal) Acute on chronic respiratory failure ([facility] 3/15/2021 Yes    Bilateral carotid artery stenosis 3/15/2021 Yes    Atherosclerosis of native artery of both lower extremities with intermittent claudication ([facility] 3/15/2021 Yes    Primary osteoarthritis of left knee 3/15/2021 Yes Arthritis of knee 3/15/2021 Yes    Essential hypertension 3/15/2021 Yes    Pure hypercholesterolemia 3/15/2021 Yes    Iron deficiency anemia 3/15/2021 Yes    GERD (gastroesophageal reflux disease) 3/15/2021 Yes    CHF (congestive heart failure) ([address] 3/15/2021 Yes    Thrombocytopenia ([address] 3/16/2021 Yes    History of bladder cancer 3/15/2021 Yes    Acute respiratory failure ([address] 3/15/2021 Yes    Chronic midline low back pain without sciatica 3/16/2021 Yes    Chronic kidney disease 3/16/2021 Yes    Atrial fibrillation ([address] 3/16/2021 Yes    Vitamin D deficiency 3/16/2021 Yes    Anemia 3/16/2021 Yes    Alcohol use 3/16/2021 Yes    Pacemaker 3/16/2021 Yes             Pulmonary Assessment/plan:    1. Acute on chronic congestive heart failure with bilateral pleural effusions worse on the right. Continue diuresis. He needs to be on daily Lasix. 2. Worsening shortness of breath likely due to the above. Supportive care. 3. Atrial fibrillation stable   4. post pacemaker placement for sinus pause. 5. Upper extremity edema due to the above diuresis. 6. Continue rehab. 7. DVT prophylaxis.         Electronically signed by Tony Cheung MD on 03/22/21 at 3:34 PM

## 2021-03-22 NOTE — PROGRESS NOTES
GI  - PROGRESS NOTE    Subjective:   Admit Date: 3/15/2021  PCP: Ramin Thorne MD    Patient being seen for anemia    24hr events/Interval History: Patient with increased shortness of breath. Hemoglobin is stable. DIET CARDIAC; Tolerated                  Pain: Mild  Nausea:None      Medications:  Scheduled Meds:   linaclotide  145 mcg Oral QAM AC    sodium chloride flush  10 mL Intravenous BID    amiodarone  200 mg Oral BID    aspirin  81 mg Oral Daily    atorvastatin  20 mg Oral Daily    ferrous sulfate  325 mg Oral BID WC    folic acid  1 mg Oral Daily    insulin lispro  0-6 Units Subcutaneous TID WC    insulin lispro  0-3 Units Subcutaneous Nightly    metoprolol succinate  100 mg Oral Daily    miconazole   Topical 4x Daily    NIFEdipine  60 mg Oral Daily    pantoprazole  40 mg Oral Daily    tamsulosin  0.4 mg Oral BID    vitamin D  50,000 Units Oral Weekly       Continuous Infusions:   sodium chloride      dextrose         PRN Meds:.sodium chloride, ondansetron, sodium chloride flush, [DISCONTINUED] acetaminophen **OR** acetaminophen, dextrose, dextrose, glucagon (rDNA), glucose, bisacodyl, calcium carbonate, guaiFENesin, ipratropium-albuterol, acetaminophen, polyethylene glycol      Labs:     Recent Labs     03/20/21  0419 03/20/21  1953 03/21/21  0436 03/22/21  0654   WBC 7.1  --  8.9 8.4   RBC 2.29*  --  2.72* 2.69*   HGB 6.7* 8.1* 8.0* 7.9*   HCT 22.3* 25.6* 26.0* 25.6*   MCV 97.4*  --  95.6* 95.2*   MCH 29.3  --  29.4 29.4   MCHC 30.0*  --  30.8* 30.9*   *  --  101* 99*     Recent Labs     03/22/21  0654      K 5.2*   ANIONGAP 10      CO2 27   BUN 45*   CREATININE 2.0*   GLUCOSE 166*   CALCIUM 7.8*     No results for input(s): MG, PHOS in the last 72 hours.   Recent Labs     03/22/21  0654   AST 16   ALT 16   BILITOT 0.6   ALKPHOS 75     HgBA1c:  No components found for: HGBA1C  FLP:    Lab Results   Component Value Date    TRIG 176 10/01/2018    HDL 38 10/01/2018    LDLCALC 72 10/01/2018     TSH:    Lab Results   Component Value Date    TSH 3.240 03/15/2021     Troponin T: No results for input(s): TROPONINI in the last 72 hours. INR: No results for input(s): INR in the last 72 hours. No results for input(s): LIPASE in the last 72 hours. -----------------------------------------------------------------  RAD:     Xr Chest (2 Vw)    Result Date: 3/22/2021  Exam:   XR CHEST (2 VW)  Date:  3/22/2021 History:  Male, age  79 years; shortness of breath. COMPARISON:  Chest x-ray dated March 13, 2021 Findings : Right-sided subclavian catheter has been removed. Left-sided cardiac device is unchanged in position with leads also unchanged in position given differences in techniques. Mild-to-moderate cardiomegaly. Bilateral perihilar opacities, fluffy in appearance, most concerning for pulmonary edema. Small right and trace left pleural effusions. Impression: Fluid overload. Signed by Dr Kunal Mesa on 3/22/2021 9:34 AM          Physical Exam:     Vitals:    03/21/21 0637 03/21/21 1851 03/22/21 0756 03/22/21 0820   BP: 123/64 (!) 102/58 (!) 93/57 118/62   Pulse: 61 63 70 72   Resp: 18 20 16 22   Temp: 97.2 °F (36.2 °C) 98.2 °F (36.8 °C) 98.3 °F (36.8 °C)    TempSrc: Temporal Temporal Temporal    SpO2: 90% 91% 90%    Weight:       Height:         24HR INTAKE/OUTPUT:      Intake/Output Summary (Last 24 hours) at 3/22/2021 1325  Last data filed at 3/22/2021 1318  Gross per 24 hour   Intake 720 ml   Output 500 ml   Net 220 ml     General appearance: alert and cooperative with exam  Lungs: diminished breath sounds bilaterally  Heart: regular rate and rhythm  Abdomen: soft, non-tender; bowel sounds normal; no masses,  no organomegaly  Extremities: edema Marked bilateral edema in both forearms. Mild pitting edema in bilateral LE. Neurologic: No obvious focal neurologic deficits.       Impression:       Hospital Problems      Last Modified POA     * (Principal) Acute on chronic respiratory failure ([address] 3/15/2021 Yes     Bilateral carotid artery stenosis 3/15/2021 Yes     Atherosclerosis of native artery of both lower extremities with intermittent claudication ([address] 3/15/2021 Yes     Primary osteoarthritis of left knee 3/15/2021 Yes     Arthritis of knee 3/15/2021 Yes     Essential hypertension 3/15/2021 Yes     Pure hypercholesterolemia 3/15/2021 Yes     Iron deficiency anemia 3/15/2021 Yes     GERD (gastroesophageal reflux disease) 3/15/2021 Yes     CHF (congestive heart failure) ([address] 3/15/2021 Yes     Thrombocytopenia ([address] 3/16/2021 Yes     History of bladder cancer 3/15/2021 Yes     Acute respiratory failure ([address] 3/15/2021 Yes     Chronic midline low back pain without sciatica 3/16/2021 Yes     Chronic kidney disease 3/16/2021 Yes     Atrial fibrillation ([address] 3/16/2021 Yes     Vitamin D deficiency 3/16/2021 Yes     Anemia 3/16/2021 Yes     Alcohol use 3/16/2021 Yes     Pacemaker 3/16/2021 Yes          Patient with normocytic anemia which is likely multifactorial in nature. He has extensive bruising and a firm area on left upper extremity suggestive of a hematoma which feels slightly improved today. His hemoglobin has been stable over the last 3 days. The drops in hemoglobin do seem to parallel the procedures that patient has had performed. Patient had a colonoscopy in 2017 with findings of diverticulosis and a hyperplastic polyp. He has no history of adenomatous polyps therefore repeat colonoscopy was not recommended due to patients age and there is no indication to perform colonoscopy at this time. Patient with moderate labored breathing and his respiratory status would place him at high risk for undergoing UGI endoscopy. I would recommend empiric therapy with PPI as you are doing. Plan:   1. Continue to monitor hemoglobin and transfuse as needed. 2. Empiric therapy with Protonix should be continued.    3. No plans for GI intervention at this time.     Please call if further assistance is needed.

## 2021-03-22 NOTE — PROGRESS NOTES
Occupational Therapy  Facility/Department: [facility] 8 REHAB UNIT  Daily Treatment Note  NAME: Barrett Dolan  : 1940  MRN: [id number]    Date of Service: 3/22/2021    Discharge Recommendations:  Home with Home health OT       Assessment   Performance deficits / Impairments: Decreased functional mobility ; Decreased endurance;Decreased strength;Decreased ROM; Decreased balance;Decreased ADL status; Decreased high-level IADLs  Activity Tolerance  Activity Tolerance: treatment limited due to shortness of breath and just not feeling well---nursing aware  Safety Devices  Safety Devices in place: Yes  Type of devices: Left in chair;Call light within reach; Chair alarm in place         Patient Diagnosis(es): There were no encounter diagnoses. has a past medical history of Acute liver failure without hepatic coma, Back pain, Bladder cancer (HCC), Blood circulation, collateral, Carotid arterial disease ([address] CKD (chronic kidney disease), stage II, COPD with acute lower respiratory infection ([address] GERD (gastroesophageal reflux disease), Hyperlipidemia, Hypertension, Hypertension, Palliative care patient, Pneumonia due to infectious organism, Primary osteoarthritis of left knee, PVD (peripheral vascular disease) ([address] Tremor, and Type 2 diabetes mellitus with complication, without long-term current use of insulin ([address] has a past surgical history that includes back surgery;  Tonsillectomy and adenoidectomy; Colonoscopy (?); pr colonoscopy flx dx w/collj spec when pfrmd (N/A, 2017); pr revise median n/carpal tunnel surg (Left, 2018); pr thromboendartectmy neck,neck incis (Left, 2018); vascular surgery (2015); vascular surgery (2015); vascular surgery (2014); vascular surgery (2013); pr total knee arthroplasty (Left, 10/15/2018); vascular surgery (10/30/2018); vascular surgery (2018); and Cystoscopy (Bilateral, 12/19/2018).     Restrictions  Restrictions/Precautions  Restrictions/Precautions: Surgical Protocols, Fall Risk  Implants present? : Pacemaker  Position Activity Restriction  Other position/activity restrictions: PACEMAKER RESTRICTIONS TO LUE   Subjective   General  Chart Reviewed: Yes, Orders  Patient assessed for rehabilitation services?: Yes  Additional Pertinent Hx: Chronic low back pain, ETOH use, bladder cancer, home O2 prn  Family / Caregiver Present: No  Diagnosis: Respiratory failure with hypoxia, recen pacemaker placement--3/13  Pain Assessment  Pain Assessment: 0-10  Pain Level: 0  Pre Treatment Pain Screening  Pain at present: 0  Scale Used: Numeric Score  Vital Signs  Patient Currently in Pain: No   Objective    Type of ROM/Therapeutic Exercise  Type of ROM/Therapeutic Exercise: Free weights(2# 15reps, 2 sets, LUE modified due to pacemaker precautions)        Plan   Plan  Specific instructions for Next Treatment: AE, endurance training  Current Treatment Recommendations: Strengthening, Patient/Caregiver Education & Training, Home Management Training, Equipment Evaluation, Education, & procurement, Balance Training, Self-Care / ADL, Safety Education & Training, Endurance Training, Functional Mobility Training    Goals  Short term goals  Time Frame for Short term goals: 1 week  Short term goal 1: pt will complete LB dressing with AE prn with CGA  Short term goal 2: pt will complete overall toileting with CGA  Short term goal 3: pt will complete simple ambulatory home making task with CGA while maintaining pacemaker precautions  Short term goal 4: pt will complete overall bathing with CGA  Short term goal 5: pt will complete 1-2 handed static standing act for 2 mins with supervision  Short term goal 6: pt will complete HEP x 5 occasions within range of pacemaker precautions to improve strength and endurance for ADLs  Long term goals  Time Frame for Long term goals : 2 weeks  Long term goal 1: pt will complete overall dressing with modified independence  Long term goal 2: pt will complete overall toileting with modified independence  Long term goal 3: pt will complete overall bathing with modified independence  Long term goal 4: pt will complete simple ambulatory home making task with modified independence  Long term goal 5: pt will complete HEP with independence  Long term goals 6: pt verbalize DME for home       Therapy Time   Individual Concurrent Group Co-treatment   Time In 1100         Time Out 1140         Minutes 40         Timed Code Treatment Minutes: 40 Minutes       Electronically signed by Hugo Mejia OT on 3/22/2021 at 1:00 PM

## 2021-03-22 NOTE — PROGRESS NOTES
Patient:   Odell Castro  MR#:    [id number]   Room:    9367/485-39   YOB: 1940  Date of Progress Note: 3/22/2021  Time of Note                           8:46 AM  Consulting Physician:   Grace Cardenas M.D. Attending Physician:  Grace Cardenas MD     Chief complaint Respiratory failure     S:This 79 y.o. male  with a past medical history of bladder cancer, HTN, CKD stage III, PVD, hyperlipidemia,CAD,COPD,CHF has oxygen at home but doesn't wear if often,ETOH use drinks 2 glasses of wine every night and former smoker. He presented to [facility] ER on 3/6/21 with shortness of breath. CXR done showed increased pulmonary vascular congestion and CT of chest showed bilateral pleural effusion. He was admitted to the hospitalist service with Acute on chronic hypoxemic and hypercapnic respiratory, COPD exacerbation and possible recurrent pneumonia. Pulmonology was consulted. He was seen by Dr. Evita Melvin, who didn't feel he had pneumoinia st this time, but did recommend Trilogy at discharge d/t patient not tolerating Bi-PAP for questionable sleep apnea. Patient had an episode of A-Fib with RVR. Cardiology was consulted. He was seen by Dr. Jeffry Roberts. He underwent cardiac catheterization by Dr. Liz Lazo on 3/10/21 revealing 100% occlusion right coronary artery which appears chronic in nature well collateralized from the left no significant disease on the left. Dr. Liz Lazo recommended placement of a dual-chamber pacemaker. Patient was in agreement and went or surgery on 3/13/21. He tolerated the procedure well. SPT was consulted to evaluate swallow. He was noted to have oropharyngeal phase dysphagia but no s/s of aspiration, he was placed on a regular diet with thin liquids.    Feels a little short of breath this morning    REVIEW OF SYSTEMS:  Constitutional: No fevers No chills  Neck:No stiffness  Respiratory: some shortness of breath  Cardiovascular: No chest pain No palpitations  Gastrointestinal: No abdominal pain    Genitourinary: No Dysuria  Neurological: No headache, no confusion    Past Medical History:      Diagnosis Date    Acute liver failure without hepatic coma 10/23/2018    Back pain     \"with tired legs as a result\"    Bladder cancer ([address] 12/19/2018    Blood circulation, collateral     Carotid arterial disease ([address]     recent surgery    CKD (chronic kidney disease), 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/2018    COPD with acute lower respiratory infection ([address] 2/24/2021    GERD (gastroesophageal reflux disease)     Hyperlipidemia     Hypertension     Hypertension     Palliative care patient 10/23/2018    Pneumonia due to infectious organism 11/06/2018    Primary osteoarthritis of left knee 10/14/2018    PVD (peripheral vascular disease) (HCC)     Tremor     Tremor on Right side x 1-2 weeks per stepdaughter    Type 2 diabetes mellitus with complication, without long-term current use of insulin ([address] 1/21/2021       Past Surgical History:      Procedure Laterality Date    BACK SURGERY      COLONOSCOPY  2007?  CYSTOSCOPY Bilateral 12/19/2018    CYSTOSCOPY, BIOSPY FULGURATION OF BLADDER TUMOR POSSIBLE TURBT, RETROGRADE PYELOGRAM performed by Chance Gibbons MD at [facility] COLONOSCOPY FLX DX W/COLLJ SPEC WHEN PFRMD N/A 9/11/2017    Dr Santos Jasmine internal hemorrhoids, diverticular disease-HP-No recall (age)   [facility] [place] REVISE MEDIAN N/CARPAL TUNNEL SURG Left 7/18/2018    OPEN CARPAL TUNNEL RELEASE performed by Beth Condon MD at [address] Left 8/28/2018    LEFT CAROTID ENDARTERECTOMY WITH VEIN PATCH ANGIOPLASTY AND COMPLETION ANGIOGRAM performed by Toña Medellin MD at [address] 10/15/2018    LEFT COMPLEX TOTAL KNEE ARTHROPLASTY performed by Beth Condon MD at [facility] VASCULAR SURGERY  04/21/2015    Kana BESS Ultrasound guided access of left common femoral artery. Aortogram.Diagnostic right lower extremity arteriogram.Radiologic supervision and interpretation.  VASCULAR SURGERY  01/13/2015    Munir Whitley M.D Atherectomy,angioplasty,and stenting of left superficial femoral artery.  VASCULAR SURGERY  03/11/2014    Munir Whitley M.D. Ultrasound-guided access of right common femoral artery. Aortogram.Left lower extremity arteriogram.Atherectomy and angioplasty of left superficial femoral artery. Radiologic supervision and interpretation.  VASCULAR SURGERY  01/18/2013    Munir LIU. Aortogram.Multistation arteriogram right lower extremity. Laser atherectomy and angioplasty of right superficial femoral artery. Selective catheterization of right tibioperoneal trunk. Angioplasty of peroneal artery and tibioperoneal trunk.  VASCULAR SURGERY  10/30/2018    SJS. Ultrasound guided cannulation of right internal vein. Placement of right internal jugular vein tunneled dialysis catheter bard equistream xk 23cm tip to cuff    VASCULAR SURGERY  12/17/2018    SJS. Removal of tunneled dilaysis catheter right internal jugular vein.        Medications in Hospital:      Current Facility-Administered Medications:     0.9 % sodium chloride infusion, , Intravenous, PRN, Demetria Morocho MD    linaclotide [facility]) capsule 145 mcg, 145 mcg, Oral, QAM AC, Geovany Mendez MD, 145 mcg at 03/22/21 0536    sodium chloride flush 0.9 % injection 10 mL, 10 mL, Intravenous, BID, Geovany Mendez MD, 10 mL at 03/22/21 0823    ondansetron (ZOFRAN) injection 4 mg, 4 mg, Intravenous, Q6H PRN, Jem Aguilera MD    sodium chloride flush 0.9 % injection 10 mL, 10 mL, Intravenous, PRN, Jem Aguilera MD    [DISCONTINUED] acetaminophen (TYLENOL) tablet 650 mg, 650 mg, Oral, Q6H PRN **OR** acetaminophen (TYLENOL) suppository 650 mg, 650 mg, Rectal, Q6H PRN, Jem Aguilera MD    dextrose 5 % solution, 100 mL/hr, Intravenous, PRN, Jem Aguilera MD    dextrose 50 % IV solution, 12.5 g, Intravenous, PRN, Quan Harry MD    glucagon (rDNA) injection 1 mg, 1 mg, Intramuscular, PRN, Quan Harry MD    glucose (GLUTOSE) 40 % oral gel 15 g, 15 g, Oral, PRN, Quan Harry MD    amiodarone (CORDARONE) tablet 200 mg, 200 mg, Oral, BID, Quan Harry MD, 200 mg at 03/22/21 0818    aspirin EC tablet 81 mg, 81 mg, Oral, Daily, Quan Harry MD, 81 mg at 03/22/21 0818    atorvastatin (LIPITOR) tablet 20 mg, 20 mg, Oral, Daily, Quan Harry MD, 20 mg at 03/22/21 0818    bisacodyl (DULCOLAX) EC tablet 5 mg, 5 mg, Oral, Daily PRN, Quan Harry MD    calcium carbonate (TUMS) chewable tablet 500 mg, 500 mg, Oral, TID PRN, Quan Harry MD    ferrous sulfate (IRON 325) tablet 325 mg, 325 mg, Oral, BID , Quan Harry MD, 325 mg at 76/41/27 5969    folic acid (FOLVITE) tablet 1 mg, 1 mg, Oral, Daily, Quan Harry MD, 1 mg at 03/22/21 0818    guaiFENesin (ROBITUSSIN) 100 MG/5ML liquid 200 mg, 200 mg, Oral, Q4H PRN, Quan Harry MD    insulin lispro (HUMALOG) injection vial 0-6 Units, 0-6 Units, Subcutaneous, TID , Quan Harry MD, 1 Units at 03/22/21 0817    insulin lispro (HUMALOG) injection vial 0-3 Units, 0-3 Units, Subcutaneous, Nightly, Quan Harry MD, 1 Units at 03/19/21 2101    ipratropium-albuterol (DUONEB) nebulizer solution 1 ampule, 1 ampule, Inhalation, Q4H PRN, Quan Harry MD    metoprolol succinate (TOPROL XL) extended release tablet 100 mg, 100 mg, Oral, Daily, Quan Harry MD, 100 mg at 03/20/21 0746    miconazole (MICOTIN) 2 % powder, , Topical, 4x Daily, Quan Harry MD, Given at 03/22/21 0821    NIFEdipine (PROCARDIA XL) extended release tablet 60 mg, 60 mg, Oral, Daily, Quan Harry MD, 60 mg at 03/22/21 0818    pantoprazole (PROTONIX) tablet 40 mg, 40 mg, Oral, Daily, Quan Harry MD, 40 mg at 03/22/21 0818    tamsulosin [facility]) capsule 0.4 mg, 0.4 mg, Oral, BID, Quan Harry MD, 0.4 mg at 03/22/21 0818    vitamin D (ERGOCALCIFEROL) capsule 50,000 Units, 50,000 Units, Oral, Weekly, Quan Harry MD, 50,000 Units at 03/22/21 0818    acetaminophen (TYLENOL) tablet 650 mg, 650 mg, Oral, Q4H PRN, Dennis Francisco MD    polyethylene glycol (GLYCOLAX) packet 17 g, 17 g, Oral, Daily PRN, Dennis Francisco MD    Allergies:  Eliquis [apixaban] and Promethazine hcl    Social History:   TOBACCO:   reports that he quit smoking about 17 years ago. He has never used smokeless tobacco.  ETOH:   reports current alcohol use of about 12.0 standard drinks of alcohol per week. Family History:       Problem Relation Age of Onset    Colon Cancer Father     Diabetes Brother     Colon Polyps Neg Hx     Liver Cancer Neg Hx     Liver Disease Neg Hx     Esophageal Cancer Neg Hx     Rectal Cancer Neg Hx     Stomach Cancer Neg Hx          PHYSICAL EXAM:  BP (!) 93/57   Pulse 70   Temp 98.3 °F (36.8 °C) (Temporal)   Resp 16   Ht 6' (1.829 m)   Wt 250 lb (113.4 kg)   SpO2 90%   BMI 33.91 kg/m²     Constitutional - well developed, well nourished. Eyes - conjunctiva normal.   Ear, nose, throat - No scars, masses, or lesions over external nose or ears, no atrophy of tongue  Neck-symmetric, no masses noted, no jugular vein distension  Respiration- chest wall appears symmetric, good expansion,   normal effort without use of accessory muscles  Musculoskeletal - no significant wasting of muscles noted, no bony deformities  Extremities-no clubbing, cyanosis or edema  Skin - warm, dry, and intact. No rash, erythema, or pallor.   Psychiatric - mood, affect, and behavior appear normal.      Neurological exam  Awake, alert, fluent oriented appropriate affect  Attention and concentration appear appropriate  Recent and remote memory appears unremarkable  Speech normal without dysarthria  No clear issues with language of fund of knowledge     Cranial Nerve Exam     CN III, IV,VI-EOMI, No nystagmus, conjugate eye movements, no ptosis    CN VII-no facial assymetry       Motor Exam  antigravity throughout upper and lower extremities bilaterally      Tremors- no tremors in hands or head noted     Gait  Not tested        Nursing/pcp notes, imaging,labs and vitals reviewed. PT,OT and/or speech notes reviewed    Lab Results   Component Value Date    WBC 8.4 03/22/2021    HGB 7.9 (L) 03/22/2021    HCT 25.6 (L) 03/22/2021    MCV 95.2 (H) 03/22/2021    PLT 99 (L) 03/22/2021     Lab Results   Component Value Date     03/22/2021    K 5.2 (H) 03/22/2021     03/22/2021    CO2 27 03/22/2021    BUN 45 (H) 03/22/2021    CREATININE 2.0 (H) 03/22/2021    GLUCOSE 166 (H) 03/22/2021    CALCIUM 7.8 (L) 03/22/2021    PROT 4.5 (L) 03/22/2021    LABALBU 2.9 (L) 03/22/2021    BILITOT 0.6 03/22/2021    ALKPHOS 75 03/22/2021    AST 16 03/22/2021    ALT 16 03/22/2021    LABGLOM 32 (A) 03/22/2021    GFRAA 39 (L) 03/22/2021     Lab Results   Component Value Date    INR 1.11 03/15/2021    INR 1.13 03/06/2021    INR 1.16 01/21/2021    PROTIME 14.3 03/15/2021    PROTIME 14.4 03/06/2021    PROTIME 14.8 (H) 01/21/2021 03/20/21 0832 03/20/21 0838 03/20/21 0839   Pain Screening   Patient Currently in Pain No  --   --    Oxygen Therapy   O2 Device Nasal cannula  --   --    O2 Flow Rate (L/min) 2 L/min  --   --    Exercises   Comments  --  Supine BLE ex  x20 reps performed at bedside.   --    Conditions Requiring Skilled Therapeutic Intervention   Assessment  --   --   --    Activity Tolerance   Activity Tolerance  --   --   --    Safety Devices   Type of devices  --   --  Bed alarm in place;Call light within reach        03/20/21 0859   Pain Screening   Patient Currently in Pain  --    Oxygen Therapy   O2 Device  --    O2 Flow Rate (L/min)  --    Exercises   Comments  --    Conditions Requiring Skilled Therapeutic Intervention   Assessment Pt. performed supine BLE exercises at bedside this session. Activity Tolerance   Activity Tolerance Patient limited by fatigue;Patient limited by endurance   Safety Devices   Type of devices  --      Objective     Balance  Sitting Balance: Supervision  Standing Balance: Contact guard assistance  Standing Balance  Time: ~2 minutes x2 occasions post shower  Functional Mobility  Functional - Mobility Device: Rolling Walker  Activity: To/from bathroom  Assist Level: Contact guard assistance  Type of ROM/Therapeutic Exercise  Type of ROM/Therapeutic Exercise: Free weights;Mary Alice  Comment: RUE: 2# distally, AROM proximally, LUE: deferred due to checking for blood clot, Mary Alice: 15# for RUE, 4 sets of 15          RECORD REVIEW: Previous medical records, medications were reviewed at today's visit    IMPRESSION:   1. Respiratory failure. CHF/COPD-O2/Duonebs PRN/tapering prednisone/Trilogy  2. Atrial fibrillation/S/P PPM-Amiodarone/Procardia XL/metorprolol  3. CAD/PVD-ASA/statin  4. Hyperlipidemia-on statin  5. Anemia-on iron. Status post 1 unit packed red blood cells with 3/12 and 3/20. Followed by hematology. 6. HTN-on meds monitor  7. GI-bowel regimen/PPI  8. BPH/history of bladder cancer-on meds  9. Vitamin D deficiency-on Vitamin D  10. DVT prophylaxis-SCDs  11. ETOH use-folic acid  12. Chronic kidney disease-monitor   13. Thrombocytopenia-monitor  14. Back pain/arthritis-Tylenol  15. PT/OT/Speech  16. Shortness of air-repeat chest x-ray and consult pulmonology. Thrombocytopenia [id number] following  Appreciate hematology and GI consults. Pulmonology consulted.   Continue current care as noted      Expected duration and frequency therapy: 180 minutes per day, 5 days per week

## 2021-03-22 NOTE — PROGRESS NOTES
Spoke with Fransisco Lam from pulmonology to verify Dr. Radha Spears phone number. Have not heard from him yet. Fransisco Lam states she will relay message to Dr. David Díaz.

## 2021-03-22 NOTE — PLAN OF CARE
Problem: Falls - Risk of:  Goal: Will remain free from falls  Description: Will remain free from falls  Outcome: Ongoing   Up with 1-2 assist depending on fatigue level

## 2021-03-23 LAB
ANION GAP SERPL CALCULATED.3IONS-SCNC: 11 MMOL/L (ref 7–19)
APTT: 39.7 SEC (ref 26–36.2)
BUN BLDV-MCNC: 46 MG/DL (ref 8–23)
CALCIUM SERPL-MCNC: 8.3 MG/DL (ref 8.8–10.2)
CHLORIDE BLD-SCNC: 104 MMOL/L (ref 98–111)
CO2: 28 MMOL/L (ref 22–29)
COPPER: 106.1 UG/DL (ref 70–140)
CREAT SERPL-MCNC: 2 MG/DL (ref 0.5–1.2)
FREE KAPPA LIGHT CHAINS: 3.86 MG/DL (ref 0.37–1.94)
FREE KAPPA/LAMBDA RATIO: 1.11 (ref 0.26–1.65)
FREE LAMBDA LIGHT CHAINS: 3.49 MG/DL (ref 0.57–2.63)
GFR AFRICAN AMERICAN: 39
GFR NON-AFRICAN AMERICAN: 32
GLUCOSE BLD-MCNC: 128 MG/DL (ref 74–109)
GLUCOSE BLD-MCNC: 133 MG/DL (ref 70–99)
GLUCOSE BLD-MCNC: 184 MG/DL (ref 70–99)
GLUCOSE BLD-MCNC: 194 MG/DL (ref 70–99)
GLUCOSE BLD-MCNC: 203 MG/DL (ref 70–99)
INR BLD: 1.18 (ref 0.88–1.18)
PERFORMED ON: ABNORMAL
POTASSIUM REFLEX MAGNESIUM: 4.1 MMOL/L (ref 3.5–5)
PRO-BNP: 1510 PG/ML (ref 0–1800)
PROTHROMBIN TIME: 14.9 SEC (ref 12–14.6)
SODIUM BLD-SCNC: 143 MMOL/L (ref 136–145)
ZINC: 54.2 UG/DL (ref 60–120)

## 2021-03-23 PROCEDURE — 85610 PROTHROMBIN TIME: CPT

## 2021-03-23 PROCEDURE — 99231 SBSQ HOSP IP/OBS SF/LOW 25: CPT | Performed by: INTERNAL MEDICINE

## 2021-03-23 PROCEDURE — 86023 IMMUNOGLOBULIN ASSAY: CPT

## 2021-03-23 PROCEDURE — 2700000000 HC OXYGEN THERAPY PER DAY

## 2021-03-23 PROCEDURE — 97110 THERAPEUTIC EXERCISES: CPT

## 2021-03-23 PROCEDURE — 94640 AIRWAY INHALATION TREATMENT: CPT

## 2021-03-23 PROCEDURE — 99232 SBSQ HOSP IP/OBS MODERATE 35: CPT | Performed by: INTERNAL MEDICINE

## 2021-03-23 PROCEDURE — 85730 THROMBOPLASTIN TIME PARTIAL: CPT

## 2021-03-23 PROCEDURE — 97116 GAIT TRAINING THERAPY: CPT

## 2021-03-23 PROCEDURE — 2580000003 HC RX 258: Performed by: PSYCHIATRY & NEUROLOGY

## 2021-03-23 PROCEDURE — 6370000000 HC RX 637 (ALT 250 FOR IP): Performed by: HOSPITALIST

## 2021-03-23 PROCEDURE — 6370000000 HC RX 637 (ALT 250 FOR IP): Performed by: PSYCHIATRY & NEUROLOGY

## 2021-03-23 PROCEDURE — 99233 SBSQ HOSP IP/OBS HIGH 50: CPT | Performed by: PSYCHIATRY & NEUROLOGY

## 2021-03-23 PROCEDURE — 80048 BASIC METABOLIC PNL TOTAL CA: CPT

## 2021-03-23 PROCEDURE — 1180000000 HC REHAB R&B

## 2021-03-23 PROCEDURE — 97530 THERAPEUTIC ACTIVITIES: CPT

## 2021-03-23 PROCEDURE — 82947 ASSAY GLUCOSE BLOOD QUANT: CPT

## 2021-03-23 PROCEDURE — 6360000002 HC RX W HCPCS: Performed by: INTERNAL MEDICINE

## 2021-03-23 PROCEDURE — 83880 ASSAY OF NATRIURETIC PEPTIDE: CPT

## 2021-03-23 PROCEDURE — 36415 COLL VENOUS BLD VENIPUNCTURE: CPT

## 2021-03-23 RX ADMIN — FERROUS SULFATE TAB 325 MG (65 MG ELEMENTAL FE) 325 MG: 325 (65 FE) TAB at 17:10

## 2021-03-23 RX ADMIN — FERROUS SULFATE TAB 325 MG (65 MG ELEMENTAL FE) 325 MG: 325 (65 FE) TAB at 08:39

## 2021-03-23 RX ADMIN — FOLIC ACID 1 MG: 1 TABLET ORAL at 08:39

## 2021-03-23 RX ADMIN — FUROSEMIDE 40 MG: 10 INJECTION, SOLUTION INTRAMUSCULAR; INTRAVENOUS at 08:39

## 2021-03-23 RX ADMIN — INSULIN LISPRO 2 UNITS: 100 INJECTION, SOLUTION INTRAVENOUS; SUBCUTANEOUS at 17:13

## 2021-03-23 RX ADMIN — TAMSULOSIN HYDROCHLORIDE 0.4 MG: 0.4 CAPSULE ORAL at 08:36

## 2021-03-23 RX ADMIN — FUROSEMIDE 40 MG: 10 INJECTION, SOLUTION INTRAMUSCULAR; INTRAVENOUS at 17:10

## 2021-03-23 RX ADMIN — SODIUM CHLORIDE, PRESERVATIVE FREE 10 ML: 5 INJECTION INTRAVENOUS at 08:39

## 2021-03-23 RX ADMIN — LINACLOTIDE 145 MCG: 145 CAPSULE, GELATIN COATED ORAL at 05:38

## 2021-03-23 RX ADMIN — ARFORMOTEROL TARTRATE 15 MCG: 15 SOLUTION RESPIRATORY (INHALATION) at 06:16

## 2021-03-23 RX ADMIN — TAMSULOSIN HYDROCHLORIDE 0.4 MG: 0.4 CAPSULE ORAL at 20:55

## 2021-03-23 RX ADMIN — ARFORMOTEROL TARTRATE 15 MCG: 15 SOLUTION RESPIRATORY (INHALATION) at 20:39

## 2021-03-23 RX ADMIN — ASPIRIN 81 MG: 81 TABLET, FILM COATED ORAL at 08:39

## 2021-03-23 RX ADMIN — AMIODARONE HYDROCHLORIDE 200 MG: 200 TABLET ORAL at 08:36

## 2021-03-23 RX ADMIN — MICONAZOLE NITRATE: 2 POWDER TOPICAL at 08:40

## 2021-03-23 RX ADMIN — MICONAZOLE NITRATE: 2 POWDER TOPICAL at 18:12

## 2021-03-23 RX ADMIN — SODIUM CHLORIDE, PRESERVATIVE FREE 10 ML: 5 INJECTION INTRAVENOUS at 21:03

## 2021-03-23 RX ADMIN — INSULIN LISPRO 1 UNITS: 100 INJECTION, SOLUTION INTRAVENOUS; SUBCUTANEOUS at 12:54

## 2021-03-23 RX ADMIN — AMIODARONE HYDROCHLORIDE 200 MG: 200 TABLET ORAL at 20:55

## 2021-03-23 RX ADMIN — BUDESONIDE 500 MCG: 0.5 SUSPENSION RESPIRATORY (INHALATION) at 20:39

## 2021-03-23 RX ADMIN — BUDESONIDE 500 MCG: 0.5 SUSPENSION RESPIRATORY (INHALATION) at 06:15

## 2021-03-23 RX ADMIN — METOPROLOL SUCCINATE 100 MG: 50 TABLET, EXTENDED RELEASE ORAL at 08:37

## 2021-03-23 RX ADMIN — ATORVASTATIN CALCIUM 20 MG: 20 TABLET, FILM COATED ORAL at 08:39

## 2021-03-23 RX ADMIN — MICONAZOLE NITRATE: 2 POWDER TOPICAL at 12:43

## 2021-03-23 RX ADMIN — PANTOPRAZOLE SODIUM 40 MG: 40 TABLET, DELAYED RELEASE ORAL at 08:39

## 2021-03-23 RX ADMIN — NIFEDIPINE 60 MG: 60 TABLET, EXTENDED RELEASE ORAL at 08:39

## 2021-03-23 RX ADMIN — MICONAZOLE NITRATE: 2 POWDER TOPICAL at 21:01

## 2021-03-23 ASSESSMENT — PAIN SCALES - GENERAL: PAINLEVEL_OUTOF10: 0

## 2021-03-23 NOTE — PROGRESS NOTES
PROGRESS NOTE    Pt Name: Vannie Fleischer  MRN: [id number]  YOB: 1940  Date of evaluation: 3/23/2021      Subjective Feels more short of breath this morning. Feels tired. Discussed with bedside RN. O2 supplement nasal cannula 2 L/min. Mr. Katie Jeronimo is a very pleasant 30-year-old  gentleman referred by Dr. Brendan Grace on the rehab unit with worsening thrombocytopenia and anemia for hematology opinion.     HEMATOLOGY HISTORY: Anemia and thrombocytopenia  Aaron Resendez was seen as an inpatient at [facility] on the rehab unit on 3/19/2021 at the request of Dr. Brendan Grace with worsening thrombocytopenia and anemia.     Aaron Reesndez has significant comorbid medical problems including morbid obesity and COPD requiring multiple admissions for respiratory distress and problems. He required intubation in January 2021.     Aaron Resendez was admitted to [address] [place] [address] 3/6/2021, treated for acute on chronic respiratory failure, COPD exacerbation and possible recurrent pneumonia, followed by Dr. Tamika Wilder. Swallowing evaluation was negative for signs or symptoms of aspiration. He was also treated for A. fib with RVR during hospitalization. Cardiac catheterization 3/10/2021 by Dr. 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 Pacemaker was placed 3/13/2021. He was admitted to rehab 3/15/2021 for strengthening with anticipation to return home.     Ashwin developed worsening thrombocytopenia and anemia. Thrombocytopenia is chronic, dating to at least 6/2017.    Platelets have ranged from 85,000 (11/2018) to a high of 228,000 (1/17/2021).   Intermittent anemia also dates to 6/2017.     Review of CBCs:             Labs 3/15/2021:  · Iron: 58, TIBC 172, iron saturation 32%, ferritin 135  · Folate: 3.6  · Vitamin B12: 377  · TSH: 3.24  · Vitamin D: 28.5  · CMP 3/19/2021: Creatinine 1.9/GFR 34, calcium 8.3, total protein 4.6     Ashwin is treated with ferrous sulfate 325 mg po BID and folic acid 1 mg po daily. He received Venofer 100 mg IV daily x3 (3/17/2021 - 3/19/2021). He is a former smoker and drinks 2 glasses of wine daily and an occasional beer.     Hematology consultation requested       REVIEW OF SYSTEMS:   CONSTITUTIONAL: no fever, no night sweats,  fatigue; activity change  HEENT: no blurring of vision, no double vision, no hearing difficulty, no tinnitus, no ulceration, no dysplasia, no epistaxis;  LUNGS: no cough, no hemoptysis, no wheeze,   shortness of breath;  CARDIOVASCULAR: no palpitation, no chest pain,  shortness of breath;  GI: no abdominal pain, no nausea, no vomiting, no diarrhea, no constipation;  KASANDRA: no dysuria, no hematuria, no frequency or urgency, no nephrolithiasis;  MUSCULOSKELETAL: joint pain, no swelling, no stiffness;  ENDOCRINE: no polyuria, no polydipsia, no cold or heat intolerance;  HEMATOLOGY: no easy bruising or bleeding, no history of clotting disorder;  DERMATOLOGY: Easy bruising, no eczema, no pruritus;  PSYCHIATRY: no depression, no anxiety, no panic attacks, no suicidal ideation, no homicidal ideation;  NEUROLOGY: no syncope, no seizures, no numbness or tingling of hands, no numbness or tingling of feet, no paresis;    Objective   BP (!) 98/58   Pulse 78   Temp 97.8 °F (36.6 °C) (Temporal)   Resp 20   Ht 6' (1.829 m)   Wt 250 lb (113.4 kg)   SpO2 92%   BMI 33.91 kg/m²     PHYSICAL EXAM:  CONSTITUTIONAL: Alert, appropriate, no acute distress, chronically ill  EYES: Non icteric, EOM intact, pupils equal round   ENT: Mucus membranes moist, no oral pharyngeal lesions, external inspection of ears and nose are normal  NECK: Supple, no masses. No palpable thyroid mass  CHEST/LUNGS: Decreased breath sounds bilateral lung base  CARDIOVASCULAR: RRR, no murmurs. No lower extremity edema  ABDOMEN: soft non-tender, active bowel sounds, no HSM.   No palpable masses  EXTREMITIES: warm, full ROM in all 4 extremities, no focal Quality: Adequate visualization Patient Status: Inpatient Contrast Medium: Definity. Amount - 3 ml BP: 129/71 mmHg  Conclusions   Summary  Limited echocardiographic study. LV appears normal in size with preserved LV systolic function. LV ejection  fraction estimated at 60-65%. RV not well visualized but appears normal in size with preserved systolic  function. .       Vl Extremity Venous Left    Result Date: 3/19/2021  Vascular Upper Extremities Veins Procedure  Demographics   Patient Name   Carlos Alberto Ch Age                 79                 E   Patient Number [id number]           Gender              Male   Visit Number   [id number]        Interpreting        Mercedes Us MD                                  Physician   Date of Birth  1940       Referring Physician Janene Lemus   Accession      [id number]       [address], RVT,  Number                                              RDMS  Procedure Type of Study:   Veins:Upper Extremities Veins, US Doppler Venous Upper Extremity  Unilateral.  Indications for Study:Edema, left upper extremity and Pain, left upper extremity. Allergies   - Other allergy:(Eliquis). - Phenergan. Technical Quality:Limited visualization due to edema. Impression   There is no evidence of deep vein thrombosis (DVT) nor superficial  thrombophlebitis (SVT) of the left upper extremity(ies). Xr Chest Portable    Result Date: 3/13/2021  XR CHEST PORTABLE 3/13/2021 12:11 PM HISTORY:   Pacemaker placement  Single view. COMPARISONS:  3/11/2021 chest radiography FINDINGS: Left-sided permanent cardiac pacemaker identified with atrioventricular leads apparently well-positioned. There are no pneumothoraces or pacemaker complication findings. Again noted are bilateral perihilar and lower lobe infiltrates greater on the right with pleural effusions. Left-sided pacemaker placed without pneumothorax or postprocedural complications.  Signed by Dr Kike Hinton on 3/13/2021 1:44 PM    Xr Chest Portable    Result Date: 3/11/2021  XR CHEST PORTABLE 3/11/2021 3:40 AM HISTORY:   Respiratory distress  Single view. COMPARISONS:  3/7/2021 chest radiography FINDINGS: Central venous catheter is appreciated at the left subclavian/brachiocephalic vein junction. Bilateral perihilar infiltrates and pleural effusions persist, greater on the right, likely unchanged considering differences in technique. 1. Stable 1 day appearance the chest. Signed by Dr Cain Irvin on 3/11/2021 7:20 AM    Xr Chest Portable    Result Date: 3/7/2021  Exam: XR CHEST PORTABLE - 3/7/2021 12:14 PM Indication: Central line placement Comparison: 3/6/2021 Findings: CVL tip overlies the SVC. Cardiac silhouette is stable. No significant change in bilateral interstitial and airspace opacity, greatest in the RIGHT lower lobe. No visible pneumothorax. No acute osseous finding. Impression: 1. CVL tip overlies the SVC. 2.  No significant change in bilateral interstitial and airspace opacity, greatest in the RIGHT lower lung. Signed by Dr Maninder Collins on 3/7/2021 1:36 PM    Xr Chest Portable    Result Date: 3/6/2021  Exam: XR CHEST PORTABLE - 3/6/2021 11:52 AM Indication: Shortness of air Comparison: 2/12/2021 Findings: Persistent and slightly increased RIGHT greater than LEFT middle and lower lung zone airspace opacity. No large pleural effusion. No pneumothorax. Cardiac silhouette is stable. No acute osseous finding. Impression: Slight increase in bilateral RIGHT greater than LEFT middle and lower lung zone airspace opacity. Signed by Dr Maninder Collins on 3/6/2021 1:14 PM    Cta Pulmonary W Contrast    Result Date: 3/6/2021  Exam: CT chest angiography with 3D MIP images with IV contrast - 3/6/2021 12:33 PM Indication: Worsening respiratory function, history of pneumonia Comparison: 2/13/2021 DLP: 927 mGy cm.  In order to have a CT radiation dose as low as reasonably achievable, Automated Exposure Control was utilized for adjustment of the mA and/or KV according to patient size. Findings: No evidence of pulmonary embolus. Main pulmonary artery is upper limits of normal in caliber. Thoracic aorta is nonaneurysmal. Atherosclerotic change in the aortic arch and coronary arteries noted. No pericardial effusion. Central airways are clear. Interlobular septal thickening. Slight interval worsening of multifocal bilateral consolidation and groundglass opacity. Disease burden is greatest in the RIGHT upper lobe. Small RIGHT greater than LEFT pleural effusions with adjacent atelectasis. No pneumothorax. Mild mediastinal lymphadenopathy similar to prior and likely reactive. 1.3 cm RIGHT thyroid nodule. No acute chest wall soft tissue abnormality. No acute osseous finding. Thoracic spine is scoliotic curvature and degenerative change. No acute osseous finding. Impression: 1. No evidence of pulmonary embolus. 2.  Slight progression of bilateral RIGHT greater than LEFT consolidation, suspicious for pneumonia. 3.  Small bilateral pleural effusions and interstitial pulmonary edema. Signed by Dr Sanjana Starks on 3/6/2021 1:56 PM    Us Spleen    Result Date: 3/20/2021  US SPLEEN 3/20/2021 12:07 PM History: Thrombocytopenia. Grayscale and color flow ultrasound evaluation of the spleen shows no abnormality. Spleen = 111 x 36 x 130 mm. No free fluid is seen at the left upper quadrant. 1. Normal size spleen. Signed by Dr Nicole Galindo on 3/20/2021 12:07 PM    ASSESSMENT/PLAN:  #Thrombocytopenia  Review of prior CBCs in the system showed episode of thrombocytopenia back in 2018. Subsequently his CBC showed normal platelets. Platelet counts were also low in February 2021 and then they normalized before dropping again. Lowest platelet counts 49,355 on 3/19/2021. Platelet counts 66,411 today.     Additional serology drawn on 3/19/2021:  · IPF: 11.9% (H) - suggesting a functioning marrow  · Copper:  In process  · Zinc: In process  · RELL: In process  · antiplatelet antibody: In process  · Hepatitis ABC: Nonreactive  · HIV rapid 1&2: non-reactive   · SPEP: In process  · Ig (L), IgA 133, IgM 150  · Kappa light chains: In process  · Erythropoietin: In process     ultrasound spleen 3/20/2021: normal spleen size of 11.1 by 3.6 x 13.0 cm     Pharmacy reviewed medications from 3/6/21 inpatient admission and 3/15/21 rehab admission for possible contributors for thrombocytopenia. The following medications that the patient has received dose(s) of have thrombocytopenia listed as a possible adverse reaction:  Amiodarone  Atorvastatin  Bumetanide  Cefazolin  Cefuroxime  Digoxin  Diltiazem  Doxycycline  Enoxaparin  Furosemide  Levofloxacin  Meropenem  Metoprolol  Nifedipine  Pantoprazole  Vancomycin     #multifactorial anemia, iron/B12/folate deficiency, anemia of chronic disease, possibly CKD associated, multiple lab draws  Hgb 7.9/MCV 95 (status post 1 unit PRBC 3/12/2021 & 3/20/2021)   3/15/2021-ferritin 135/iron saturation 34%  Iron 58, TIBC 172, folate 3.6, vitamin B12 377  Status post Venofer 100 mg daily x3, completed 3/19/2021  Venofer 400 mg IV given 3/20/2021, 300 mg IV due today, 3/21/2021 (total 1 g)  Continue oral iron  Continue folate replacement  Last colonoscopy 2017 documented a hyperplastic sigmoid colon polypectomy  Check stool for OB - specimen needs to be collected  GI consulted-no recommendations for colonoscopy. Last colonoscopy .       #Respiratory Failure, h/o CHF/COPD  Supplemental O2 3 L/min  As per attending  Chest x-ray showed fluid overload  Patient started on Lasix with improvement  Pulmonary recommended daily diuresis  Interesting, proBNP was normal     Generalized weakness  PT/OT ongoing     #Recent A.  Fib with RVR/pacemaker  As per cardiology     #History of bladder cancer  Followed by Dr. Barrington Mejia   UA 3/17/2021 trace blood  Records being retrieved    #Easy bruising-PT was normal on 3/15/2021  Patient is on aspirin.   Repeat PT/PTT     Hermes Childs MD    03/23/21  6:55 AM

## 2021-03-23 NOTE — PROGRESS NOTES
Pulmonary and Critical Care Progress note. [address] [place] [address]    MRN# [id number]    Acct# [de-identified]  3/23/2021   6:40 PM CDT    Referring Zion Ayon MD      Chief Complaint: Shortness of breath    HPI: Over the last 24 hours he has received Lasix with adequate urine output. He feels significantly improved. He continues to be on IV Lasix. He has participated with rehab today. He feels he had the best day today since his stay at rehab. Medications    furosemide, 40 mg, Intravenous, BID    budesonide, 0.5 mg, Nebulization, BID    Arformoterol Tartrate, 15 mcg, Nebulization, BID    linaclotide, 145 mcg, Oral, QAM AC    sodium chloride flush, 10 mL, Intravenous, BID    amiodarone, 200 mg, Oral, BID    aspirin, 81 mg, Oral, Daily    atorvastatin, 20 mg, Oral, Daily    ferrous sulfate, 325 mg, Oral, BID WC    folic acid, 1 mg, Oral, Daily    insulin lispro, 0-6 Units, Subcutaneous, TID WC    insulin lispro, 0-3 Units, Subcutaneous, Nightly    metoprolol succinate, 100 mg, Oral, Daily    miconazole, , Topical, 4x Daily    NIFEdipine, 60 mg, Oral, Daily    pantoprazole, 40 mg, Oral, Daily    tamsulosin, 0.4 mg, Oral, BID    vitamin D, 50,000 Units, Oral, Weekly     Review of Systems:  RESPIRATORY:  positive for  dyspnea  CARDIOVASCULAR:  positive for  dyspnea      Physical Exam:  /70   Pulse 84   Temp 98.3 °F (36.8 °C) (Temporal)   Resp 16   Ht 6' (1.829 m)   Wt 250 lb (113.4 kg)   SpO2 91%   BMI 33.91 kg/m²     Intake/Output Summary (Last 24 hours) at 3/23/2021 1840  Last data filed at 3/23/2021 1350  Gross per 24 hour   Intake 840 ml   Output 1500 ml   Net -660 ml       General Appearance: alert and oriented to person, place and time, well-developed and well-nourished, in no acute distress  ENT: Normocephalic atraumatic  Pulmonary/Chest: Diminished breath sounds bilaterally.   No rubs or chest wall tenderness or dullness to disease) 3/15/2021 Yes    CHF (congestive heart failure) ([address] 3/15/2021 Yes    Thrombocytopenia ([address] 3/16/2021 Yes    History of bladder cancer 3/15/2021 Yes    Acute respiratory failure ([address] 3/15/2021 Yes    Chronic midline low back pain without sciatica 3/16/2021 Yes    Chronic kidney disease 3/16/2021 Yes    Atrial fibrillation ([address] 3/16/2021 Yes    Vitamin D deficiency 3/16/2021 Yes    Anemia 3/16/2021 Yes    Alcohol use 3/16/2021 Yes    Pacemaker 3/16/2021 Yes           Pulmonary Assessment/Plan:     1. Acute on chronic congestive heart failure with bilateral pleural effusions improved with diuresis. 2. Worsening shortness of breath due to the above improved with diuresis. 3. Atrial fibrillation currently stable. 4. Post pacemaker placement for sinus pause stable. 5. Dyspnea due to the above significantly improved with diuresis. 6. Continue rehab.           Electronically signed by Tony Cheung MD on 03/23/21 at 6:40 PM

## 2021-03-23 NOTE — PROGRESS NOTES
[facility]        Hospitalist Progress Note  3/23/2021 10:26 AM  Subjective:   Admit Date: 3/15/2021  PCP: Caitlyn Stubbs MD    Chief Complaint: Dyspnea    Subjective: Patient seen and examined at bedside. Breathing much improved from yesterday. About to work with therapy. Denies chest pain. ROS: 14 point review of systems is negative except as specifically addressed above.     DIET CARDIAC; Daily Fluid Restriction: 1500 ml    Intake/Output Summary (Last 24 hours) at 3/23/2021 1026  Last data filed at 3/23/2021 8888  Gross per 24 hour   Intake 1200 ml   Output 2025 ml   Net -825 ml     Medications:   sodium chloride      dextrose       Current Facility-Administered Medications   Medication Dose Route Frequency Provider Last Rate Last Admin    furosemide (LASIX) injection 40 mg  40 mg Intravenous BID Ricco Pérez MD   40 mg at 03/23/21 0839    budesonide (PULMICORT) nebulizer suspension 500 mcg  0.5 mg Nebulization BID Ricco Pérez MD   500 mcg at 03/23/21 0615    Arformoterol Tartrate (BROVANA) nebulizer solution 15 mcg  15 mcg Nebulization BID Ricco Pérez MD   15 mcg at 03/23/21 0616    0.9 % sodium chloride infusion   Intravenous PRN Demetria Morocho MD        linaclotide [facility]) capsule 145 mcg  145 mcg Oral QAM AC Geovany Mendez MD   145 mcg at 03/23/21 0538    sodium chloride flush 0.9 % injection 10 mL  10 mL Intravenous BID Geovany Mendez MD   10 mL at 03/23/21 0839    ondansetron (ZOFRAN) injection 4 mg  4 mg Intravenous Q6H PRN Ramandeep Cabrera MD        sodium chloride flush 0.9 % injection 10 mL  10 mL Intravenous PRN Ramandeep Cabrera MD   10 mL at 03/22/21 1650    acetaminophen (TYLENOL) suppository 650 mg  650 mg Rectal Q6H PRN Ramandeep Cabrera MD        dextrose 5 % solution  100 mL/hr Intravenous PRN Ramandeep Cabrera MD        dextrose 50 % IV solution  12.5 g Intravenous PRN Ramandeep Cabrera MD        glucagon (rDNA) injection 1 mg  1 mg Intramuscular PRN Sharie Curling, MD        glucose (GLUTOSE) 40 % oral gel 15 g  15 g Oral PRN Sharie Curling, MD        amiodarone (CORDARONE) tablet 200 mg  200 mg Oral BID Sharie Curling, MD   200 mg at 03/23/21 0836    aspirin EC tablet 81 mg  81 mg Oral Daily Sharie Curling, MD   81 mg at 03/23/21 4681    atorvastatin (LIPITOR) tablet 20 mg  20 mg Oral Daily Sharie Curling, MD   20 mg at 03/23/21 6875    bisacodyl (DULCOLAX) EC tablet 5 mg  5 mg Oral Daily PRN Sharie Curling, MD        calcium carbonate (TUMS) chewable tablet 500 mg  500 mg Oral TID PRN Sharie Curling, MD        ferrous sulfate (IRON 325) tablet 325 mg  325 mg Oral BID  Sharie Curling, MD   325 mg at 36/71/79 3628    folic acid (FOLVITE) tablet 1 mg  1 mg Oral Daily Sharie Curling, MD   1 mg at 03/23/21 0839    guaiFENesin (ROBITUSSIN) 100 MG/5ML liquid 200 mg  200 mg Oral Q4H PRN Sharie Curling, MD        insulin lispro (HUMALOG) injection vial 0-6 Units  0-6 Units Subcutaneous TID  Sharie Curling, MD   1 Units at 03/22/21 1647    insulin lispro (HUMALOG) injection vial 0-3 Units  0-3 Units Subcutaneous Nightly Sharie Curling, MD   1 Units at 03/22/21 2050    ipratropium-albuterol (DUONEB) nebulizer solution 1 ampule  1 ampule Inhalation Q4H PRN Sharie Curling, MD   1 ampule at 03/22/21 1005    metoprolol succinate (TOPROL XL) extended release tablet 100 mg  100 mg Oral Daily Sharie Curling, MD   100 mg at 03/23/21 0837    miconazole (MICOTIN) 2 % powder   Topical 4x Daily Sharie Curling, MD   Given at 03/23/21 0840    NIFEdipine (PROCARDIA XL) extended release tablet 60 mg  60 mg Oral Daily Sharie Curling, MD   60 mg at 03/23/21 0839    pantoprazole (PROTONIX) tablet 40 mg  40 mg Oral Daily Sharie Curling, MD   40 mg at 03/23/21 0839    tamsulosin (FLOMAX) capsule 0.4 mg  0.4 mg Oral BID Viri Rubalcava MD   0.4 mg at 03/23/21 1898    vitamin D (ERGOCALCIFEROL) capsule 50,000 Units  50,000 Units Oral Weekly Viri Rubalcava MD   50,000 Units at 03/22/21 0818    acetaminophen (TYLENOL) tablet 650 mg  650 mg Oral Q4H PRN Nasra Anthony MD        polyethylene glycol (GLYCOLAX) packet 17 g  17 g Oral Daily PRN Nasra Anthony MD            Labs:     Recent Labs     03/20/21 1953 03/21/21  0436 03/22/21  0654   WBC  --  8.9 8.4   RBC  --  2.72* 2.69*   HGB 8.1* 8.0* 7.9*   HCT 25.6* 26.0* 25.6*   MCV  --  95.6* 95.2*   MCH  --  29.4 29.4   MCHC  --  30.8* 30.9*   PLT  --  101* 99*     Recent Labs     03/22/21  0654 03/23/21  0328    143   K 5.2* 4.1   ANIONGAP 10 11    104   CO2 27 28   BUN 45* 46*   CREATININE 2.0* 2.0*   GLUCOSE 166* 128*   CALCIUM 7.8* 8.3*     No results for input(s): MG, PHOS in the last 72 hours. Recent Labs     03/22/21  0654   AST 16   ALT 16   BILITOT 0.6   ALKPHOS 75     ABGs:No results for input(s): PH, PO2, PCO2, HCO3, BE, O2SAT in the last 72 hours. Troponin T: No results for input(s): TROPONINI in the last 72 hours. INR:   Recent Labs     03/23/21  0328   INR 1.18     Lactic Acid: No results for input(s): LACTA in the last 72 hours. Objective:   Vitals: /70   Pulse 70   Temp 98.3 °F (36.8 °C) (Temporal)   Resp 16   Ht 6' (1.829 m)   Wt 250 lb (113.4 kg)   SpO2 90%   BMI 33.91 kg/m²   24HR INTAKE/OUTPUT:      Intake/Output Summary (Last 24 hours) at 3/23/2021 1026  Last data filed at 3/23/2021 5785  Gross per 24 hour   Intake 1200 ml   Output 2025 ml   Net -825 ml     General appearance: Alert    HEENT:  AT/NC  Lungs: BLAE, diminished breath sounds, +crackles, no wheezing appreciated  Heart: RRR  Vascular: +pulses  Abdomen: BS+, soft, NT, ND  Extremities: +edema  Neurologic: Alert, gross motor function intact  Psychiatric: calm, mood appropriate. Skin: warm, multiple bruises.     Assessment and Plan:   Principal Problem:    Acute on chronic respiratory failure (HCC)  Active Problems:    Bilateral carotid artery stenosis    Atherosclerosis of native artery of both lower extremities with intermittent claudication (HCC)    Primary osteoarthritis of left knee    Arthritis of knee    Essential hypertension    Pure hypercholesterolemia    Iron deficiency anemia    GERD (gastroesophageal reflux disease)    CHF (congestive heart failure) (HCC)    Thrombocytopenia (HCC)    History of bladder cancer    Acute respiratory failure (HCC)    Chronic midline low back pain without sciatica    Chronic kidney disease    Atrial fibrillation (HCC)    Vitamin D deficiency    Anemia    Alcohol use    Pacemaker  Resolved Problems:    * No resolved hospital problems. *    Dyspnea/Pulmonary edema: Breathing much improved today. Continue lasix. Monitor I's/O's. Monitor BMP. Bronchodilators. O2 PRN - wean as tolerated. Pulmonology also on board.     Signed:  Berlin Amos MD 3/23/2021 10:26 AM  Rounding Hospitalist

## 2021-03-23 NOTE — PROGRESS NOTES
Occupational Therapy  Facility/Department: [facility] 8 REHAB UNIT  Daily Treatment Note  NAME: Laurel Moore  : 1940  MRN: [id number]    Date of Service: 3/23/2021    Discharge Recommendations:  Home with Home health OT       Assessment   Performance deficits / Impairments: Decreased functional mobility ; Decreased endurance;Decreased strength;Decreased ROM; Decreased balance;Decreased ADL status; Decreased high-level IADLs  Assessment: Pt now on 3 L O2. O2 sats decreased to 87% post functional mobility. Treatment Diagnosis: Pneumonia, recent pacemaker (3-13-21)  OT Education: Transfer Training;Energy Conservation  Activity Tolerance  Activity Tolerance: Patient limited by fatigue  Safety Devices  Safety Devices in place: Yes  Type of devices: Bed alarm in place;Call light within reach         Patient Diagnosis(es): There were no encounter diagnoses. has a past medical history of Acute liver failure without hepatic coma, Back pain, Bladder cancer (HCC), Blood circulation, collateral, Carotid arterial disease ([address] CKD (chronic kidney disease), stage II, COPD with acute lower respiratory infection ([address] GERD (gastroesophageal reflux disease), Hyperlipidemia, Hypertension, Hypertension, Palliative care patient, Pneumonia due to infectious organism, Primary osteoarthritis of left knee, PVD (peripheral vascular disease) ([address] Tremor, and Type 2 diabetes mellitus with complication, without long-term current use of insulin ([address] has a past surgical history that includes back surgery;  Tonsillectomy and adenoidectomy; Colonoscopy (?); pr colonoscopy flx dx w/collj spec when pfrmd (N/A, 2017); pr revise median n/carpal tunnel surg (Left, 2018); pr thromboendartectmy neck,neck incis (Left, 2018); vascular surgery (2015); vascular surgery (2015); vascular surgery (2014); vascular surgery (2013); pr total knee arthroplasty (Left, 10/15/2018); vascular surgery (10/30/2018); Short term goals: 1 week  Short term goal 1: pt will complete LB dressing with AE prn with CGA  Short term goal 2: pt will complete overall toileting with CGA  Short term goal 3: pt will complete simple ambulatory home making task with CGA while maintaining pacemaker precautions  Short term goal 4: pt will complete overall bathing with CGA  Short term goal 5: pt will complete 1-2 handed static standing act for 2 mins with supervision  Short term goal 6: pt will complete HEP x 5 occasions within range of pacemaker precautions to improve strength and endurance for ADLs  Long term goals  Time Frame for Long term goals : 2 weeks  Long term goal 1: pt will complete overall dressing with modified independence  Long term goal 2: pt will complete overall toileting with modified independence  Long term goal 3: pt will complete overall bathing with modified independence  Long term goal 4: pt will complete simple ambulatory home making task with modified independence  Long term goal 5: pt will complete HEP with independence  Long term goals 6: pt verbalize DME for home       Therapy Time   Individual Concurrent Group Co-treatment   Time In 1100         Time Out 1200         Minutes 60         Timed Code Treatment Minutes: [address], OT

## 2021-03-23 NOTE — PROGRESS NOTES
Attempting to reposition/turn patient on side. Refuses. .states, \"wished to be on bottom\". Education given.

## 2021-03-23 NOTE — PROGRESS NOTES
Occupational Therapy  Facility/Department: [facility] 8 REHAB UNIT  Daily Treatment Note  NAME: Giacomo Zhang  : 1940  MRN: [id number]    Date of Service: 3/23/2021    Discharge Recommendations:  Home with Home health OT       Assessment   Performance deficits / Impairments: Decreased functional mobility ; Decreased endurance;Decreased strength;Decreased ROM; Decreased balance;Decreased ADL status; Decreased high-level IADLs  Assessment: O2 sats decreased to 86% post 10 feet of functional mobility on 3 L O2 per nc with SOB. Treatment Diagnosis: Pneumonia, recent pacemaker (3-13-21)  OT Education: Transfer Training;Energy Conservation  Activity Tolerance  Activity Tolerance: Patient limited by fatigue  Safety Devices  Safety Devices in place: Yes(Left with PT)  Type of devices: Left in chair         Patient Diagnosis(es): There were no encounter diagnoses. has a past medical history of Acute liver failure without hepatic coma, Back pain, Bladder cancer (HCC), Blood circulation, collateral, Carotid arterial disease ([address] CKD (chronic kidney disease), stage II, COPD with acute lower respiratory infection ([address] GERD (gastroesophageal reflux disease), Hyperlipidemia, Hypertension, Hypertension, Palliative care patient, Pneumonia due to infectious organism, Primary osteoarthritis of left knee, PVD (peripheral vascular disease) ([address] Tremor, and Type 2 diabetes mellitus with complication, without long-term current use of insulin ([address] has a past surgical history that includes back surgery;  Tonsillectomy and adenoidectomy; Colonoscopy (?); pr colonoscopy flx dx w/collj spec when pfrmd (N/A, 2017); pr revise median n/carpal tunnel surg (Left, 2018); pr thromboendartectmy neck,neck incis (Left, 2018); vascular surgery (2015); vascular surgery (2015); vascular surgery (2014); vascular surgery (2013); pr total knee arthroplasty (Left, 10/15/2018); vascular surgery (10/30/2018); vascular surgery (12/17/2018); and Cystoscopy (Bilateral, 12/19/2018). Restrictions  Restrictions/Precautions  Restrictions/Precautions: Surgical Protocols, Fall Risk  Implants present? : Pacemaker  Position Activity Restriction  Other position/activity restrictions: PACEMAKER RESTRICTIONS TO LUE   Subjective   General  Chart Reviewed: Yes, Orders  Patient assessed for rehabilitation services?: Yes  Additional Pertinent Hx: Chronic low back pain, ETOH use, bladder cancer, home O2 prn  Family / Caregiver Present: No  Diagnosis: Respiratory failure with hypoxia, recen pacemaker placement--3/13  Vital Signs  Pulse: 84  Oxygen Therapy  SpO2: 91 %  Pulse Oximeter Device Mode: Intermittent  Pulse Oximeter Device Location: Left;Finger  O2 Device: Nasal cannula  O2 Flow Rate (L/min): 3 L/min   Objective             Balance  Standing Balance: Contact guard assistance  Functional Mobility  Functional - Mobility Device: Rolling Walker  Activity: Other  Assist Level: Contact guard assistance  Functional Mobility Comments: ~10 feet  Bed mobility  Supine to Sit: Stand by assistance  Sit to Supine: Stand by assistance  Comment: in standard bed  Transfers  Stand Step Transfers: Contact guard assistance  Sit to stand: Contact guard assistance  Stand to sit: Contact guard assistance  Transfer Comments: to/from w/c and standard bed. Type of ROM/Therapeutic Exercise  Type of ROM/Therapeutic Exercise: AROM  Comment: 20 reps; hand, wrist and elbow pumps post retrograde massage to reduce severe edema.         Plan   Plan  Specific instructions for Next Treatment: AE, endurance training  Current Treatment Recommendations: Strengthening, Patient/Caregiver Education & Training, Home Management Training, Equipment Evaluation, Education, & procurement, Balance Training, Self-Care / ADL, Safety Education & Training, Endurance Training, Functional Mobility Training       Goals  Short term goals  Time Frame for Short term goals: 1 week  Short term goal 1: pt will complete LB dressing with AE prn with CGA  Short term goal 2: pt will complete overall toileting with CGA  Short term goal 3: pt will complete simple ambulatory home making task with CGA while maintaining pacemaker precautions  Short term goal 4: pt will complete overall bathing with CGA  Short term goal 5: pt will complete 1-2 handed static standing act for 2 mins with supervision  Short term goal 6: pt will complete HEP x 5 occasions within range of pacemaker precautions to improve strength and endurance for ADLs  Long term goals  Time Frame for Long term goals : 2 weeks  Long term goal 1: pt will complete overall dressing with modified independence  Long term goal 2: pt will complete overall toileting with modified independence  Long term goal 3: pt will complete overall bathing with modified independence  Long term goal 4: pt will complete simple ambulatory home making task with modified independence  Long term goal 5: pt will complete HEP with independence  Long term goals 6: pt verbalize DME for home       Therapy Time   Individual Concurrent Group Co-treatment   Time In 1345         Time Out 1430         Minutes 45         Timed Code Treatment Minutes: [address] [facility], OT

## 2021-03-23 NOTE — PROGRESS NOTES
03/23/21 1000   Restrictions/Precautions   Restrictions/Precautions Surgical Protocols; Fall Risk   Implants present? Pacemaker   Pain Assessment   Pain Assessment 0-10   Pain Level 0   Patient's Stated Pain Goal No pain   Oxygen Therapy   SpO2 95 %   O2 Device Nasal cannula   O2 Flow Rate (L/min) 3 L/min   Bed Mobility   Supine to Sit Stand by assistance  (to left side; triplanar )   Transfers   Sit to Stand Contact guard assistance   Stand Pivot Transfers Contact guard assistance  (to the [facility], to the right )   Ambulation   Ambulation? Yes   More Ambulation? Yes   Ambulation 1   Surface level tile   Device Rolling Walker   Other Apparatus O2;Wheelchair follow   Assistance Contact guard assistance   Quality of Gait RECIPROCAL, DECREASED POSTURE WITH RW TOO FAR OUT IN FRONT. Gait Deviations Slow Nae   Distance 60 FT, 50 FT   Comments REQUIRED PROLONGED REST BREAK TO RETURN TO BASELINE 02. ABLE TO RETURN TO BASELINE 02 WITH 5-8 MINUTES OF REST. Other exercises   Other exercises 1 SEATED BLE KNEE EXT X20    Other exercises 2 SEATED ANKLE PUMPS X20   Conditions Requiring Skilled Therapeutic Intervention   Body structures, Functions, Activity limitations Decreased functional mobility ; Decreased ADL status; Decreased endurance;Decreased posture   Assessment AMBULATED 60FT AND 50 FT WITH 5-8 MINUTE REST BREAK TO RETURN TO BASELINE 02 OF 95%. POST AMBULATION 02 AROUND 86-89%, BUT PATIENT IS ABLE TO RETURN TO NORMAL VALUES/BASELINE WITHIN 3-4 MINUTES. NO C/O SOB OR LABORED BREATHING DURING AMBULATION. PT AMBULATES WITH A FORWARD FLEXED POSTURE AND RW TOO FAR FROM BODY, EXACERBATING HIS ABILITY TO BREATHE EFFECTIVELY AND EFFICIENTLY. Activity Tolerance   Activity Tolerance Patient Tolerated treatment well;Patient limited by fatigue;Patient limited by endurance   Safety Devices   Type of devices Left in chair;Gait belt; All fall risk precautions in place  (WITH OT )

## 2021-03-23 NOTE — PROGRESS NOTES
Patient:   Abbie Land  MR#:    [id number]   Room:    [facility]/605-72   YOB: 1940  Date of Progress Note: 3/23/2021  Time of Note                           8:08 AM  Consulting Physician:   Marichuy Gonzalez M.D. Attending Physician:  Marichuy Gonzalez MD     Chief complaint Respiratory failure     S:This 79 y.o. male  with a past medical history of bladder cancer, HTN, CKD stage III, PVD, hyperlipidemia,CAD,COPD,CHF has oxygen at home but doesn't wear if often,ETOH use drinks 2 glasses of wine every night and former smoker. He presented to [facility] ER on 3/6/21 with shortness of breath. CXR done showed increased pulmonary vascular congestion and CT of chest showed bilateral pleural effusion. He was admitted to the hospitalist service with Acute on chronic hypoxemic and hypercapnic respiratory, COPD exacerbation and possible recurrent pneumonia. Pulmonology was consulted. He was seen by Dr. Stephen Victor, who didn't feel he had pneumoinia st this time, but did recommend Trilogy at discharge d/t patient not tolerating Bi-PAP for questionable sleep apnea. Patient had an episode of A-Fib with RVR. Cardiology was consulted. He was seen by Dr. Kingston Flynn. He underwent cardiac catheterization by Dr. Chasity Noriega on 3/10/21 revealing 100% occlusion right coronary artery which appears chronic in nature well collateralized from the left no significant disease on the left. Dr. Chasity Noriega recommended placement of a dual-chamber pacemaker. Patient was in agreement and went or surgery on 3/13/21. He tolerated the procedure well. SPT was consulted to evaluate swallow. He was noted to have oropharyngeal phase dysphagia but no s/s of aspiration, he was placed on a regular diet with thin liquids. Breathing better today.     REVIEW OF SYSTEMS:  Constitutional: No fevers No chills  Neck:No stiffness  Respiratory: some shortness of breath  Cardiovascular: No chest pain No palpitations  Gastrointestinal: No abdominal pain Genitourinary: No Dysuria  Neurological: No headache, no confusion    Past Medical History:      Diagnosis Date    Acute liver failure without hepatic coma 10/23/2018    Back pain     \"with tired legs as a result\"    Bladder cancer ([facility] [address] 12/19/2018    Blood circulation, collateral     Carotid arterial disease ([address]     recent surgery    CKD (chronic kidney disease), 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/2018    COPD with acute lower respiratory infection ([address] 2/24/2021    GERD (gastroesophageal reflux disease)     Hyperlipidemia     Hypertension     Hypertension     Palliative care patient 10/23/2018    Pneumonia due to infectious organism 11/06/2018    Primary osteoarthritis of left knee 10/14/2018    PVD (peripheral vascular disease) (HCC)     Tremor     Tremor on Right side x 1-2 weeks per stepdaughter    Type 2 diabetes mellitus with complication, without long-term current use of insulin ([facility] [address] 1/21/2021       Past Surgical History:      Procedure Laterality Date    BACK SURGERY      COLONOSCOPY  2007?  CYSTOSCOPY Bilateral 12/19/2018    CYSTOSCOPY, BIOSPY FULGURATION OF BLADDER TUMOR POSSIBLE TURBT, RETROGRADE PYELOGRAM performed by Guillermo Clark MD at [facility] COLONOSCOPY FLX DX W/COLLJ SPEC WHEN PFRMD N/A 9/11/2017    Dr Maryann Mascorro internal hemorrhoids, diverticular disease-HP-No recall (age)   [facility] N/CARPAL TUNNEL SURG Left 7/18/2018    OPEN CARPAL TUNNEL RELEASE performed by Ban Haro MD at [address] Left 8/28/2018    LEFT CAROTID ENDARTERECTOMY WITH VEIN PATCH ANGIOPLASTY AND COMPLETION ANGIOGRAM performed by Lyndy Severs, MD at [address] 10/15/2018    LEFT COMPLEX TOTAL KNEE ARTHROPLASTY performed by Ban Haro MD at [facility] VASCULAR SURGERY  04/21/2015    Abdullahi BESS Ultrasound guided access of left common femoral artery. Aortogram.Diagnostic right lower extremity arteriogram.Radiologic supervision and interpretation.  VASCULAR SURGERY  01/13/2015    Radha Bruno M.D Atherectomy,angioplasty,and stenting of left superficial femoral artery.  VASCULAR SURGERY  03/11/2014    Radha Bruno M.D. Ultrasound-guided access of right common femoral artery. Aortogram.Left lower extremity arteriogram.Atherectomy and angioplasty of left superficial femoral artery. Radiologic supervision and interpretation.  VASCULAR SURGERY  01/18/2013    Radha BESS Aortogram.Multistation arteriogram right lower extremity. Laser atherectomy and angioplasty of right superficial femoral artery. Selective catheterization of right tibioperoneal trunk. Angioplasty of peroneal artery and tibioperoneal trunk.  VASCULAR SURGERY  10/30/2018    [facility]. Ultrasound guided cannulation of right internal vein. Placement of right internal jugular vein tunneled dialysis catheter bard equistream xk 23cm tip to cuff    VASCULAR SURGERY  12/17/2018    [facility]. Removal of tunneled dilaysis catheter right internal jugular vein.        Medications in Hospital:      Current Facility-Administered Medications:     furosemide (LASIX) injection 40 mg, 40 mg, Intravenous, BID, Carmela Iniguez MD, 40 mg at 03/22/21 1649    budesonide (PULMICORT) nebulizer suspension 500 mcg, 0.5 mg, Nebulization, BID, Carmela Iniguez MD, 500 mcg at 03/23/21 0615    Arformoterol Tartrate (BROVANA) nebulizer solution 15 mcg, 15 mcg, Nebulization, BID, Carmela Iniguez MD, 15 mcg at 03/23/21 0616    0.9 % sodium chloride infusion, , Intravenous, PRN, Terell Luciano MD    linaclotide [facility]) capsule 145 mcg, 145 mcg, Oral, QAM AC, Spike Hess MD, 145 mcg at 03/23/21 0538    sodium chloride flush 0.9 % injection 10 mL, 10 mL, Intravenous, BID, Spike Hess MD, 10 mL at 03/22/21 2058    ondansetron (ZOFRAN) injection 4 mg, 4 mg, Intravenous, Q6H PRN, Rafael Us MD    sodium chloride flush 0.9 % injection 10 mL, 10 mL, Intravenous, PRN, Henrry Verduzco MD, 10 mL at 03/22/21 1650    [DISCONTINUED] acetaminophen (TYLENOL) tablet 650 mg, 650 mg, Oral, Q6H PRN **OR** acetaminophen (TYLENOL) suppository 650 mg, 650 mg, Rectal, Q6H PRN, Henrry Verduzco MD    dextrose 5 % solution, 100 mL/hr, Intravenous, PRN, Henrry Verduzco MD    dextrose 50 % IV solution, 12.5 g, Intravenous, PRN, Henrry Verduzco MD    glucagon (rDNA) injection 1 mg, 1 mg, Intramuscular, PRN, Henrry Verduzco MD    glucose (GLUTOSE) 40 % oral gel 15 g, 15 g, Oral, PRN, Henrry Verduzco MD    amiodarone (CORDARONE) tablet 200 mg, 200 mg, Oral, BID, Henrry Verduzco MD, 200 mg at 03/22/21 2049    aspirin EC tablet 81 mg, 81 mg, Oral, Daily, Henrry Verduzco MD, 81 mg at 03/22/21 0818    atorvastatin (LIPITOR) tablet 20 mg, 20 mg, Oral, Daily, Henrry Verduzco MD, 20 mg at 03/22/21 0818    bisacodyl (DULCOLAX) EC tablet 5 mg, 5 mg, Oral, Daily PRN, Henrry Verduzco MD    calcium carbonate (TUMS) chewable tablet 500 mg, 500 mg, Oral, TID PRN, Henrry Verduzco MD    ferrous sulfate (IRON 325) tablet 325 mg, 325 mg, Oral, BID , Henrry Verduzco MD, 325 mg at 56/51/78 6727    folic acid (FOLVITE) tablet 1 mg, 1 mg, Oral, Daily, Henrry Verduzco MD, 1 mg at 03/22/21 0818    guaiFENesin (ROBITUSSIN) 100 MG/5ML liquid 200 mg, 200 mg, Oral, Q4H PRN, Henrry Verduzco MD    insulin lispro (HUMALOG) injection vial 0-6 Units, 0-6 Units, Subcutaneous, TID , Henrry Verduzco MD, 1 Units at 03/22/21 1647    insulin lispro (HUMALOG) injection vial 0-3 Units, 0-3 Units, Subcutaneous, Nightly, Henrry Verduzco MD, 1 Units at 03/22/21 2050    ipratropium-albuterol (DUONEB) nebulizer solution 1 ampule, 1 ampule, Inhalation, Q4H PRN, Henrry Verduzco MD, 1 ampule at 03/22/21 1005    metoprolol succinate (TOPROL XL) extended release tablet muscles noted, no bony deformities  Extremities-no clubbing, cyanosis or edema  Skin - warm, dry, and intact. No rash, erythema, or pallor. Psychiatric - mood, affect, and behavior appear normal.      Neurological exam  Awake, alert, fluent oriented appropriate affect  Attention and concentration appear appropriate  Recent and remote memory appears unremarkable  Speech normal without dysarthria  No clear issues with language of fund of knowledge     Cranial Nerve Exam     CN III, IV,VI-EOMI, No nystagmus, conjugate eye movements, no ptosis    CN VII-no facial assymetry       Motor Exam  antigravity throughout upper and lower extremities bilaterally      Tremors- no tremors in hands or head noted     Gait  Not tested        Nursing/pcp notes, imaging,labs and vitals reviewed.      PT,OT and/or speech notes reviewed    Lab Results   Component Value Date    WBC 8.4 03/22/2021    HGB 7.9 (L) 03/22/2021    HCT 25.6 (L) 03/22/2021    MCV 95.2 (H) 03/22/2021    PLT 99 (L) 03/22/2021     Lab Results   Component Value Date     03/23/2021    K 4.1 03/23/2021     03/23/2021    CO2 28 03/23/2021    BUN 46 (H) 03/23/2021    CREATININE 2.0 (H) 03/23/2021    GLUCOSE 128 (H) 03/23/2021    CALCIUM 8.3 (L) 03/23/2021    PROT 4.5 (L) 03/22/2021    LABALBU 2.9 (L) 03/22/2021    BILITOT 0.6 03/22/2021    ALKPHOS 75 03/22/2021    AST 16 03/22/2021    ALT 16 03/22/2021    LABGLOM 32 (A) 03/23/2021    GFRAA 39 (L) 03/23/2021     Lab Results   Component Value Date    INR 1.18 03/23/2021    INR 1.11 03/15/2021    INR 1.13 03/06/2021    PROTIME 14.9 (H) 03/23/2021    PROTIME 14.3 03/15/2021    PROTIME 14.4 03/06/2021     PHYSICAL THERAPY  STRENGTH  Strength RLE  Strength RLE: WFL  Strength LLE  Strength LLE: WFL  ROM  AROM RLE (degrees)  RLE AROM: WFL  AROM LLE (degrees)  LLE AROM : WFL  BED MOBILITY  Bed Mobility  Bridging: Stand by assistance  Rolling: Stand by assistance  Supine to Sit: Stand by assistance  Sit to Supine: Stand by assistance  Comment: REQUIRED INCREASED REST BREAKS DUE TO POOR ENDURANCE AND SOB   TRANSFERS  Transfers  Sit to Stand: Minimal Assistance  Stand to sit: Minimal Assistance  Bed to Chair: Minimal assistance  Comment: VCS FOR HAND PLACEMENT. REQUIRED INCREASED REST BREAKS DUE TO POOR ENDURANCE AND SOB   WHEELCHAIR PROPULSION  Propulsion 1  Propulsion: Manual  Level: Level Tile  Method: CARLITO HERNÁNDEZ  Level of Assistance: Stand by assistance  Description/ Details: 48'  Distance: Pt was able to increase distance this afternoon. Pt kept veering to R side needing cues to correct. AMBULATION  Ambulation 1  Surface: level tile  Device: Rolling Walker  Other Apparatus: O2(2L)  Assistance: Contact guard assistance  Quality of Gait: Practiced turning and sitting this afternoon during amb. Pt performed turns well, but fatigued quickly. Distance: 20'x3  STAIRS  GOALS:  Short term goals  Time Frame for Short term goals: 1 WEEK   Short term goal 1: SUPERVISION BED MOBILITY   Short term goal 2: SUPEVISION STS TF W/ RW   Short term goal 3: CGA 50 FT AMBULATION WITH RW   Short term goal 4: CGA CAR TF WITH RW   Short term goal 5: SUPERVISION HEP      Long term goals  Time Frame for Long term goals : 2-3 WEEKS   Long term goal 1: INDEPENDENT BED MOBILITY   Long term goal 2: INDEPENDENT STS TF   Long term goal 3: SUPERVISION 100 FT AMBULATION RW FOR SAFETY DUE TO DECREASED CV ENDURANCE   Long term goal 4: INDEPENDENT STAIR NEGOTIATION 4 STEPS  Long term goal 5: INDEPENDENT HEP     ASSESSMENT:  Assessment: Pt was more fatigued this morning and O2 has been increased to 3L. Pt required Min A-Mod A during Tf's. Pt also needed several rest breaks during bed mobility and ADL's due to SOB.   Pt missed 15 minutes of therapy due to having chest x-ray performed.        SPEECH THERAPY        OCCUPATIONAL THERAPY     CURRENT IRF-RHONDA SCORES  Eating: CARE Score: 5     Oral Hygiene: CARE Score: 5         Toileting: CARE Score: 2  Comment: assistance with pants up/down due to increased fatigue this session      Shower/Bathe: CARE Score: 3  Comment: Shower Mod A        Upper Body Dressing: CARE Score: 3  Comment: vc for tech, min A to pull over head       Lower Body Dressing: CARE Score: 3  Comment: Able to thread pants over feet, but was fatigued post shower to pull pants up.        Footwear: CARE Score: 2  Comment: Did not attempt sock aide due to fatigue. He is able to don socks in w/c, but not able to post shower due to fatigue.        Toilet Transfers: CARE Score: 3  Comment:  Mod A on toilet without BSC, Min A with BSC over it.        Picking Up Object:  CARE Score: 88           UE Functioning:  L shld limited to 90 deg elevaiton due to recent pacemaker, WFL distally  R WFLs     Pain Assessment:  Pain Level: 0     STGs:  Short term goals  Time Frame for Short term goals: 1 week  Short term goal 1: pt will complete LB dressing with AE prn with CGA  Short term goal 2: pt will complete overall toileting with CGA  Short term goal 3: pt will complete simple ambulatory home making task with CGA while maintaining pacemaker precautions  Short term goal 4: pt will complete overall bathing with CGA  Short term goal 5: pt will complete 1-2 handed static standing act for 2 mins with supervision  Short term goal 6: pt will complete HEP x 5 occasions within range of pacemaker precautions to improve strength and endurance for ADLs     LTGs:  Long term goals  Time Frame for Long term goals : 2 weeks  Long term goal 1: pt will complete overall dressing with modified independence  Long term goal 2: pt will complete overall toileting with modified independence  Long term goal 3: pt will complete overall bathing with modified independence  Long term goal 4: pt will complete simple ambulatory home making task with modified independence  Long term goal 5: pt will complete HEP with independence  Long term goals 6: pt verbalize DME for home     Assessment:  Performance deficits / Impairments: Decreased functional mobility , Decreased endurance, Decreased strength, Decreased ROM, Decreased balance, Decreased ADL status, Decreased high-level IADLs                        NUTRITION  Current Wt: Weight: 250 lb (113.4 kg) / Body mass index is 33.91 kg/m². Admission Wt: Admission Body Weight: 250 lb (113.4 kg)  Oral Diet Orders: Cardiac     Pt remains adequately nourished AEB adequate po intake (% most meals) and stable wt since admission. Continue current POC. Please see nutrition notes for further details.       RECORD REVIEW: Previous medical records, medications were reviewed at today's visit    IMPRESSION:   1. Respiratory failure. CHF/COPD-O2/Duonebs PRN/tapering prednisone/Trilogy  2. Atrial fibrillation/S/P PPM-Amiodarone/Procardia XL/metorprolol  3. CAD/PVD-ASA/statin  4. Hyperlipidemia-on statin  5. Anemia-on iron. Status post 1 unit packed red blood cells with 3/12 and 3/20. Followed by hematology. 6. HTN-on meds monitor  7. GI-bowel regimen/PPI  8. BPH/history of bladder cancer-on meds  9. Vitamin D deficiency-on Vitamin D  10. DVT prophylaxis-SCDs  11. ETOH use-folic acid  12. Chronic kidney disease-monitor   13. Thrombocytopenia-monitor  14. Back pain/arthritis-Tylenol  15. PT/OT/Speech  16. Shortness of air-repeat chest x-ray and consult pulmonology. Thrombocytopenia szzjga-xrbhgrqsd-zojwllnwxl following  17. Pulmonary edema. Better  appeciate hospitalist/pulmonology/h/o assistance. Watch renal fct.       Expected duration and frequency therapy: 180 minutes per day, 5 days per week      Staffing this date , mutlidisciplinary with entire team with complex decision making and planning for discharge  ELOS:3/29

## 2021-03-23 NOTE — PLAN OF CARE
signs and symptoms of constipation and how to prevent/alleviate  Outcome: Ongoing  Goal: STG - patient will be continent of stool  Outcome: Ongoing  Goal: STG - Patient completes digital stimulation technique  Outcome: Ongoing  Goal: STG - Patient verbalizes knowledge about relationship between diet, fluid intake, activity and medication on constipation  Outcome: Ongoing     Problem: IP BREATHING  Goal: LTG - patient will mobilize secretions and maintain airway  Outcome: Ongoing  Goal: LTG - Patient/caregiver will demonstrate/perform proper techniques to maintain patent airway  Outcome: Ongoing  Goal: LTG - patient/caregiver will demonstrate/perform improved or stable self care abilities within physical limitations  Outcome: Ongoing  Goal: STG - Patient/caregiver will maintain patent airway  Outcome: Ongoing  Goal: STG - respiratory rate and effort will be within normal limits for the patient  Outcome: Ongoing  Goal: STG - patient/caregiver will be able to verbalize oxygen safety precautions  Outcome: Ongoing  Goal: STG - Patient/caregiver demonstrates correct suctioning technique  Outcome: Ongoing  Goal: STG - patient will utilize incentive spirometer  Outcome: Ongoing  Goal: STG - Patient performs or directs assisted coughing  Outcome: Ongoing  Goal: STG - patient can administer MDI's  Outcome: Ongoing  Goal: STG - Patient can utilize incentive spirometer  Outcome: Ongoing  Goal: STG - family can complete suctioning  Outcome: Ongoing     Problem: NUTRITION  Goal: Patient maintains adequate hydration  Outcome: Ongoing  Goal: Patient maintains weight  Outcome: Ongoing  Goal: Patient/Family demonstrates understanding of diet  Outcome: Ongoing  Goal: Patient/Family independently completes tube feeding  Outcome: Ongoing  Goal: Patient will have no more than 5 lb weight change during LOS  Outcome: Ongoing  Goal: Patient will utilize adaptive techniques to administer nutrition  Outcome: Ongoing  Goal: Patient will verbalize dietary restrictions  Outcome: Ongoing     Problem: SAFETY  Goal: LTG - patient will adhere to hip precautions during ADL's and transfers  Outcome: Ongoing  Goal: LTG - Patient will demonstrate safety requirements appropriate to situation/environment  Outcome: Ongoing  Goal: LTG - patient will utilize safety techniques  Outcome: Ongoing  Goal: STG - patient locks brakes on wheelchair  Outcome: Ongoing  Goal: STG - Patient uses call light consistently to request assistance with transfers  Outcome: Ongoing  Goal: STG - patient uses gait belt during all transfers  Outcome: Ongoing     Problem: SKIN INTEGRITY  Goal: LTG - Patient will be free from infection  Outcome: Ongoing  Goal: LTG - patient will maintain/improve skin integrity through proper skin care techniques  Outcome: Ongoing  Goal: LTG - Patient will demonstrate appropriate pressure relief techniques  Outcome: Ongoing  Goal: LTG - patient will demonstrate appropriate skin care techniques  Outcome: Ongoing  Goal: LTG - Patient will be free from infection  Outcome: Ongoing  Goal: STG - Patient demonstrates skin care/treatment/dressing change  Outcome: Ongoing  Goal: STG - patient will maintain good skin integrity  Outcome: Ongoing  Goal: STG - Patient exhibits signs of wound healing.   Outcome: Ongoing  Goal: STG - patient demonstrates pressure reduction techniques  Outcome: Ongoing  Goal: STG - Patient demonstrates preventative skin care measures  Outcome: Ongoing     Problem: PAIN  Goal: LTG - Patient will manage pain with the appropriate technique/Intervention  Outcome: Ongoing  Goal: LTG - Patient will demonstrate intervention for managing pain  Outcome: Ongoing  Goal: STG - Patient will reduce or eliminate use of analgesics  Outcome: Ongoing  Goal: STG - pain is manageable through therapies  Outcome: Ongoing  Goal: STG - Patient will verbalize an acceptable level of pain  Outcome: Ongoing  Goal: STG - patients pain is managed to allow active participation in daily activities  Outcome: Ongoing  Goal: STG - Patient will increase activity level  Outcome: Ongoing  Goal: STG - patient verbalizes a reduction in pain level  Outcome: Ongoing

## 2021-03-24 LAB
ANION GAP SERPL CALCULATED.3IONS-SCNC: 10 MMOL/L (ref 7–19)
BUN BLDV-MCNC: 44 MG/DL (ref 8–23)
CALCIUM SERPL-MCNC: 8.2 MG/DL (ref 8.8–10.2)
CHLORIDE BLD-SCNC: 101 MMOL/L (ref 98–111)
CO2: 30 MMOL/L (ref 22–29)
CREAT SERPL-MCNC: 1.9 MG/DL (ref 0.5–1.2)
GFR AFRICAN AMERICAN: 42
GFR NON-AFRICAN AMERICAN: 34
GLUCOSE BLD-MCNC: 126 MG/DL (ref 74–109)
GLUCOSE BLD-MCNC: 146 MG/DL (ref 70–99)
GLUCOSE BLD-MCNC: 165 MG/DL (ref 70–99)
GLUCOSE BLD-MCNC: 205 MG/DL (ref 70–99)
GLUCOSE BLD-MCNC: 237 MG/DL (ref 70–99)
OCCULT BLOOD DIAGNOSTIC: NORMAL
OCCULT BLOOD QC: NORMAL
PERFORMED ON: ABNORMAL
POTASSIUM REFLEX MAGNESIUM: 4.1 MMOL/L (ref 3.5–5)
SODIUM BLD-SCNC: 141 MMOL/L (ref 136–145)

## 2021-03-24 PROCEDURE — 6370000000 HC RX 637 (ALT 250 FOR IP): Performed by: INTERNAL MEDICINE

## 2021-03-24 PROCEDURE — 6370000000 HC RX 637 (ALT 250 FOR IP): Performed by: PSYCHIATRY & NEUROLOGY

## 2021-03-24 PROCEDURE — 97530 THERAPEUTIC ACTIVITIES: CPT

## 2021-03-24 PROCEDURE — 2700000000 HC OXYGEN THERAPY PER DAY

## 2021-03-24 PROCEDURE — 2580000003 HC RX 258: Performed by: PSYCHIATRY & NEUROLOGY

## 2021-03-24 PROCEDURE — 80048 BASIC METABOLIC PNL TOTAL CA: CPT

## 2021-03-24 PROCEDURE — 36415 COLL VENOUS BLD VENIPUNCTURE: CPT

## 2021-03-24 PROCEDURE — 82272 OCCULT BLD FECES 1-3 TESTS: CPT

## 2021-03-24 PROCEDURE — 94640 AIRWAY INHALATION TREATMENT: CPT

## 2021-03-24 PROCEDURE — 82947 ASSAY GLUCOSE BLOOD QUANT: CPT

## 2021-03-24 PROCEDURE — 99233 SBSQ HOSP IP/OBS HIGH 50: CPT | Performed by: INTERNAL MEDICINE

## 2021-03-24 PROCEDURE — 6370000000 HC RX 637 (ALT 250 FOR IP): Performed by: HOSPITALIST

## 2021-03-24 PROCEDURE — 6360000002 HC RX W HCPCS: Performed by: INTERNAL MEDICINE

## 2021-03-24 PROCEDURE — 97110 THERAPEUTIC EXERCISES: CPT

## 2021-03-24 PROCEDURE — 1180000000 HC REHAB R&B

## 2021-03-24 PROCEDURE — 97535 SELF CARE MNGMENT TRAINING: CPT

## 2021-03-24 PROCEDURE — 97116 GAIT TRAINING THERAPY: CPT

## 2021-03-24 PROCEDURE — 99232 SBSQ HOSP IP/OBS MODERATE 35: CPT | Performed by: PSYCHIATRY & NEUROLOGY

## 2021-03-24 RX ORDER — FUROSEMIDE 40 MG/1
40 TABLET ORAL 2 TIMES DAILY
Status: DISCONTINUED | OUTPATIENT
Start: 2021-03-24 | End: 2021-03-29 | Stop reason: HOSPADM

## 2021-03-24 RX ADMIN — SODIUM CHLORIDE, PRESERVATIVE FREE 10 ML: 5 INJECTION INTRAVENOUS at 20:45

## 2021-03-24 RX ADMIN — ARFORMOTEROL TARTRATE 15 MCG: 15 SOLUTION RESPIRATORY (INHALATION) at 06:03

## 2021-03-24 RX ADMIN — TAMSULOSIN HYDROCHLORIDE 0.4 MG: 0.4 CAPSULE ORAL at 20:41

## 2021-03-24 RX ADMIN — TAMSULOSIN HYDROCHLORIDE 0.4 MG: 0.4 CAPSULE ORAL at 08:33

## 2021-03-24 RX ADMIN — AMIODARONE HYDROCHLORIDE 200 MG: 200 TABLET ORAL at 20:41

## 2021-03-24 RX ADMIN — ASPIRIN 81 MG: 81 TABLET, FILM COATED ORAL at 08:33

## 2021-03-24 RX ADMIN — SODIUM CHLORIDE, PRESERVATIVE FREE 10 ML: 5 INJECTION INTRAVENOUS at 08:40

## 2021-03-24 RX ADMIN — BUDESONIDE 500 MCG: 0.5 SUSPENSION RESPIRATORY (INHALATION) at 19:44

## 2021-03-24 RX ADMIN — ATORVASTATIN CALCIUM 20 MG: 20 TABLET, FILM COATED ORAL at 08:33

## 2021-03-24 RX ADMIN — ARFORMOTEROL TARTRATE 15 MCG: 15 SOLUTION RESPIRATORY (INHALATION) at 19:44

## 2021-03-24 RX ADMIN — FOLIC ACID 1 MG: 1 TABLET ORAL at 08:33

## 2021-03-24 RX ADMIN — METOPROLOL SUCCINATE 100 MG: 50 TABLET, EXTENDED RELEASE ORAL at 08:29

## 2021-03-24 RX ADMIN — INSULIN LISPRO 1 UNITS: 100 INJECTION, SOLUTION INTRAVENOUS; SUBCUTANEOUS at 17:00

## 2021-03-24 RX ADMIN — MICONAZOLE NITRATE: 2 POWDER TOPICAL at 08:39

## 2021-03-24 RX ADMIN — FUROSEMIDE 40 MG: 40 TABLET ORAL at 16:58

## 2021-03-24 RX ADMIN — FERROUS SULFATE TAB 325 MG (65 MG ELEMENTAL FE) 325 MG: 325 (65 FE) TAB at 08:33

## 2021-03-24 RX ADMIN — FERROUS SULFATE TAB 325 MG (65 MG ELEMENTAL FE) 325 MG: 325 (65 FE) TAB at 16:58

## 2021-03-24 RX ADMIN — PANTOPRAZOLE SODIUM 40 MG: 40 TABLET, DELAYED RELEASE ORAL at 08:33

## 2021-03-24 RX ADMIN — NIFEDIPINE 60 MG: 60 TABLET, EXTENDED RELEASE ORAL at 08:33

## 2021-03-24 RX ADMIN — LINACLOTIDE 145 MCG: 145 CAPSULE, GELATIN COATED ORAL at 05:48

## 2021-03-24 RX ADMIN — MICONAZOLE NITRATE: 2 POWDER TOPICAL at 20:40

## 2021-03-24 RX ADMIN — FUROSEMIDE 40 MG: 10 INJECTION, SOLUTION INTRAMUSCULAR; INTRAVENOUS at 08:33

## 2021-03-24 RX ADMIN — INSULIN LISPRO 2 UNITS: 100 INJECTION, SOLUTION INTRAVENOUS; SUBCUTANEOUS at 11:53

## 2021-03-24 RX ADMIN — BUDESONIDE 500 MCG: 0.5 SUSPENSION RESPIRATORY (INHALATION) at 06:04

## 2021-03-24 RX ADMIN — INSULIN LISPRO 1 UNITS: 100 INJECTION, SOLUTION INTRAVENOUS; SUBCUTANEOUS at 08:40

## 2021-03-24 RX ADMIN — AMIODARONE HYDROCHLORIDE 200 MG: 200 TABLET ORAL at 08:33

## 2021-03-24 ASSESSMENT — PAIN SCALES - GENERAL: PAINLEVEL_OUTOF10: 0

## 2021-03-24 NOTE — PROGRESS NOTES
03/24/21 1430   Restrictions/Precautions   Restrictions/Precautions Surgical Protocols; Fall Risk   Pain Assessment   Pain Assessment 0-10   Pain Level 0   Oxygen Therapy   SpO2 90 %   O2 Device Nasal cannula   O2 Flow Rate (L/min) 3 L/min   Bed Mobility   Rolling Stand by assistance   Supine to Sit Stand by assistance   Scooting Independent   Transfers   Sit to Stand Minimal Assistance; Moderate Assistance   Stand to sit Contact guard assistance   Ambulation   Ambulation? Yes   Ambulation 1   Surface level tile   Device Rolling Walker   Other Apparatus O2;Wheelchair follow  (4 L)   Assistance Contact guard assistance   Quality of Gait reciprocal, fatigues quickly. Breaks taken throughout ambulation. Gait Deviations Slow Nae;Decreased step length   Distance 75'   Comments O2 dropped to 83 and carmina back to 90s with rest.   Ambulation 2   Surface - 2 level tile   Device 2 Rolling Walker   Other Apparatus 2 Wheelchair follow;O2  (4 L)   Assistance 2 Contact guard assistance   Quality of Gait 2 reciprocal, fatigues quickly. Breaks taken throughout ambulation. Gait Deviations Slow Nae;Decreased step length   Distance 30'   Comments O2 dropped to 90. Muscles fatigued. Exercises   Comments Hip flexion with light manual resistance 2x12; hip ABD with manual resistance 2x12; LAQ with manual resistance 1x12. Conditions Requiring Skilled Therapeutic Intervention   Assessment Patient required mod to min A sit to stand transfers and CGA with ambulation. Pt.'s O2 dropped to 83 but recovered to the 90s with rest.    Activity Tolerance   Activity Tolerance Patient Tolerated treatment well;Patient limited by fatigue   Safety Devices   Type of devices All fall risk precautions in place; Bed alarm in place;Call light within reach; Patient at risk for falls; Left in bed

## 2021-03-24 NOTE — PLAN OF CARE
Problem: Falls - Risk of:  Goal: Will remain free from falls  Description: Will remain free from falls  3/24/2021 1113 by Rosie Ang RN  Outcome: Ongoing  3/23/2021 2335 by Roel Jones RN  Outcome: Ongoing  Goal: Absence of physical injury  Description: Absence of physical injury  3/24/2021 1113 by Rosie Ang RN  Outcome: Ongoing  3/23/2021 2335 by Roel Jones RN  Outcome: Ongoing     Problem: IP BLADDER/VOIDING  Goal: LTG - patient will complete bladder elimination  3/24/2021 1113 by Rosie Ang RN  Outcome: Ongoing  3/23/2021 2335 by Roel Jones RN  Outcome: Ongoing  Goal: LTG - Patient will utilize adaptive techniques/equipment to complete bladder elimination  3/24/2021 1113 by Rosie Ang RN  Outcome: Ongoing  3/23/2021 2335 by Roel Jones RN  Outcome: Ongoing  Goal: LTG - patient will achieve acceptable level of continence  3/24/2021 1113 by Rosie Ang RN  Outcome: Ongoing  3/23/2021 2335 by Roel Jones RN  Outcome: Ongoing  Goal: STG - Patient demonstrates ability to take care of indwelling catheter  3/24/2021 1113 by Rosie Ang RN  Outcome: Ongoing  3/23/2021 2335 by Roel Jones RN  Outcome: Ongoing  Goal: STG - patient demonstrates self-cath technique using clean technique and care of the catheter  3/24/2021 1113 by Rosie Ang RN  Outcome: Ongoing  3/23/2021 2335 by Roel Jones RN  Outcome: Ongoing  Goal: STG - Patient demonstrates no accidents  3/24/2021 1113 by Rosie Ang RN  Outcome: Ongoing  3/23/2021 2335 by Roel Jones RN  Outcome: Ongoing  Goal: STG - Patient will state signs and symptoms of UTI  3/24/2021 1113 by Rosie Ang RN  Outcome: Ongoing  3/23/2021 2335 by Roel Jones RN  Outcome: Ongoing  Goal: STG - patient will be able to empty bladder  3/24/2021 1113 by Rosie Ang RN  Outcome: Ongoing  3/23/2021 2335 by Roel Jones RN  Outcome: Ongoing  Goal: STG - Patient demonstrates understanding of self catheterization schedule by completing task on time  3/24/2021 1113 by Kelly Issa RN  Outcome: Ongoing  3/23/2021 2335 by Zayda Arroyo RN  Outcome: Ongoing  Goal: STG - patient participates in bladder program by expressing need to void  3/24/2021 1113 by Kelly Issa RN  Outcome: Ongoing  3/23/2021 2335 by Zayda Arroyo RN  Outcome: Ongoing  Goal: STG - Patient verbalizes understanding of catheter care indwelling/intermittent  3/24/2021 1113 by Kelly Issa RN  Outcome: Ongoing  3/23/2021 2335 by Zayda Arroyo RN  Outcome: Ongoing  Goal: STG - patient participates in bladder program by adhering to implemented toileting schedule  3/24/2021 1113 by Kelly Issa RN  Outcome: Ongoing  3/23/2021 2335 by Zayda Arroyo RN  Outcome: Ongoing     Problem: IP BOWEL ELIMINATION  Goal: LTG - patient will utilize adaptive techniques/equipment to complete bowel elimination  3/24/2021 1113 by Kelly Issa RN  Outcome: Ongoing  3/23/2021 2335 by Zayda Arroyo RN  Outcome: Ongoing  Goal: LTG - patient will have regular and routine bowel evacuation  3/24/2021 1113 by Kelly Issa RN  Outcome: Ongoing  3/23/2021 2335 by Zayda Arroyo RN  Outcome: Ongoing  Goal: STG - patient will be accident free  3/24/2021 1113 by Kelly Issa RN  Outcome: Ongoing  3/23/2021 2335 by Zayda Arroyo RN  Outcome: Ongoing  Goal: STG - Patient demonstrates care and management of ostomy bag  3/24/2021 1113 by Kelly Issa RN  Outcome: Ongoing  3/23/2021 2335 by Zayda Arroyo RN  Outcome: Ongoing  Goal: STG - Patient participates in bowel management program  3/24/2021 1113 by Kelly Issa RN  Outcome: Ongoing  3/23/2021 2335 by Zayda Arroyo RN  Outcome: Ongoing  Goal: STG - patient maintains skin integrity  3/24/2021 1113 by Kelly Issa RN  Outcome: Ongoing  3/23/2021 2335 by Zayda Arroyo RN  Outcome: Ongoing  Goal: STG - Patient will verbalize signs and symptoms of constipation and how to prevent/alleviate  3/24/2021 1113 by Kelly Issa RN  Outcome: Ongoing  3/23/2021 2335 by Danitza Guevara RN  Outcome: Ongoing  Goal: STG - patient will utilize incentive spirometer  3/24/2021 1113 by Avtar Dorado RN  Outcome: Ongoing  3/23/2021 2335 by Danitza Guevara RN  Outcome: Ongoing  Goal: STG - Patient performs or directs assisted coughing  3/24/2021 1113 by Avtar Dorado RN  Outcome: Ongoing  3/23/2021 2335 by Danitza Guevara RN  Outcome: Ongoing  Goal: STG - patient can administer MDI's  3/24/2021 1113 by Avtar Dorado RN  Outcome: Ongoing  3/23/2021 2335 by Danitza Guevara RN  Outcome: Ongoing  Goal: STG - Patient can utilize incentive spirometer  3/24/2021 1113 by Avtar Dorado RN  Outcome: Ongoing  3/23/2021 2335 by Danitza Guevara RN  Outcome: Ongoing  Goal: STG - family can complete suctioning  3/24/2021 1113 by Avtar Dorado RN  Outcome: Ongoing  3/23/2021 2335 by Danitza Guevara RN  Outcome: Ongoing     Problem: NUTRITION  Goal: Patient maintains adequate hydration  3/24/2021 1113 by Avtar Dorado RN  Outcome: Ongoing  3/23/2021 2335 by Danitza Guevara RN  Outcome: Ongoing  Goal: Patient maintains weight  3/24/2021 1113 by Avtar Dorado RN  Outcome: Ongoing  3/23/2021 2335 by Danitza Guevara RN  Outcome: Ongoing  Goal: Patient/Family demonstrates understanding of diet  3/24/2021 1113 by Avtar Dorado RN  Outcome: Ongoing  3/23/2021 2335 by Danitza Guevara RN  Outcome: Ongoing  Goal: Patient/Family independently completes tube feeding  3/24/2021 1113 by Avtar Dorado RN  Outcome: Ongoing  3/23/2021 2335 by Danitza Guevara RN  Outcome: Ongoing  Goal: Patient will have no more than 5 lb weight change during LOS  3/24/2021 1113 by Avtar Dorado RN  Outcome: Ongoing  3/23/2021 2335 by Danitza Guevara RN  Outcome: Ongoing  Goal: Patient will utilize adaptive techniques to administer nutrition  3/24/2021 1113 by Avtar Dorado, RN  Outcome: Ongoing  3/23/2021 2335 by Danitza Guevara, RN  Outcome: Ongoing  Goal: Patient will verbalize dietary restrictions  3/24/2021 1113 by Pravin Alvarez will be free from infection  3/24/2021 1113 by Trevor Lindsey RN  Outcome: Ongoing  3/23/2021 2335 by Odessa Jeans, RN  Outcome: Ongoing  Goal: STG - Patient demonstrates skin care/treatment/dressing change  3/24/2021 1113 by Trevor Lindsey RN  Outcome: Ongoing  3/23/2021 2335 by Odessa Jeans, RN  Outcome: Ongoing  Goal: STG - patient will maintain good skin integrity  3/24/2021 1113 by Trevor Lindsey RN  Outcome: Ongoing  3/23/2021 2335 by Odessa Jeans, RN  Outcome: Ongoing  Goal: STG - Patient exhibits signs of wound healing.  3/24/2021 1113 by Trevor Lindsey RN  Outcome: Ongoing  3/23/2021 2335 by Odessa Jeans, RN  Outcome: Ongoing  Goal: STG - patient demonstrates pressure reduction techniques  3/24/2021 1113 by Trevor Lindsey RN  Outcome: Ongoing  3/23/2021 2335 by Odessa Jeans, RN  Outcome: Ongoing  Goal: STG - Patient demonstrates preventative skin care measures  3/24/2021 1113 by Trevor Lindsey RN  Outcome: Ongoing  3/23/2021 2335 by Odessa Jeans, RN  Outcome: Ongoing     Problem: PAIN  Goal: LTG - Patient will manage pain with the appropriate technique/Intervention  3/24/2021 1113 by Trevor Lindsey RN  Outcome: Ongoing  3/23/2021 2335 by Odessa Jeans, RN  Outcome: Ongoing  Goal: LTG - Patient will demonstrate intervention for managing pain  3/24/2021 1113 by Trevor Lindsey RN  Outcome: Ongoing  3/23/2021 2335 by Odessa Jeans, RN  Outcome: Ongoing  Goal: STG - Patient will reduce or eliminate use of analgesics  3/24/2021 1113 by Trevor Lindsey RN  Outcome: Ongoing  3/23/2021 2335 by Odessa Jeans, RN  Outcome: Ongoing  Goal: STG - pain is manageable through therapies  3/24/2021 1113 by Trevor Lindsey RN  Outcome: Ongoing  3/23/2021 2335 by Odessa Jeans, RN  Outcome: Ongoing  Goal: STG - Patient will verbalize an acceptable level of pain  3/24/2021 1113 by Trevor Lindsey RN  Outcome: Ongoing  3/23/2021 2335 by Odessa Jeans, RN  Outcome: Ongoing  Goal: STG - patients pain is managed to allow active participation in daily activities  3/24/2021 1113 by Tammi Cancino RN  Outcome: Ongoing  3/23/2021 2335 by Geraldean Bamberger, RN  Outcome: Ongoing  Goal: STG - Patient will increase activity level  3/24/2021 1113 by Tammi Cancino RN  Outcome: Ongoing  3/23/2021 2335 by Geraldean Bamberger, RN  Outcome: Ongoing  Goal: STG - patient verbalizes a reduction in pain level  3/24/2021 1113 by Tammi Cancino RN  Outcome: Ongoing  3/23/2021 2335 by Geraldean Bamberger, RN  Outcome: Ongoing

## 2021-03-24 NOTE — PROGRESS NOTES
Pulmonary and Critical Care Progress note. [address] [place] [address]    MRN# [id number]    Acct# [de-identified]  3/24/2021   4:00 PM CDT    Referring Dustin Poole MD      Chief Complaint: Shortness of breath    HPI: Over the last 24 hours he continues to feel better with his breathing. He is participating in rehab. Medications    furosemide, 40 mg, Intravenous, BID    budesonide, 0.5 mg, Nebulization, BID    Arformoterol Tartrate, 15 mcg, Nebulization, BID    linaclotide, 145 mcg, Oral, QAM AC    sodium chloride flush, 10 mL, Intravenous, BID    amiodarone, 200 mg, Oral, BID    aspirin, 81 mg, Oral, Daily    atorvastatin, 20 mg, Oral, Daily    ferrous sulfate, 325 mg, Oral, BID WC    folic acid, 1 mg, Oral, Daily    insulin lispro, 0-6 Units, Subcutaneous, TID WC    insulin lispro, 0-3 Units, Subcutaneous, Nightly    metoprolol succinate, 100 mg, Oral, Daily    miconazole, , Topical, 4x Daily    NIFEdipine, 60 mg, Oral, Daily    pantoprazole, 40 mg, Oral, Daily    tamsulosin, 0.4 mg, Oral, BID    vitamin D, 50,000 Units, Oral, Weekly     Review of Systems:  RESPIRATORY:  positive for  dyspnea  CARDIOVASCULAR:  positive for  dyspnea      Physical Exam:  /60   Pulse 79   Temp 97.9 °F (36.6 °C) (Temporal)   Resp 20   Ht 6' (1.829 m)   Wt 250 lb (113.4 kg)   SpO2 90%   BMI 33.91 kg/m²     Intake/Output Summary (Last 24 hours) at 3/24/2021 1606  Last data filed at 3/24/2021 1243  Gross per 24 hour   Intake 960 ml   Output 850 ml   Net 110 ml       General Appearance: alert and oriented to person, place and time, well-developed and well-nourished, in no acute distress  ENT: Normocephalic atraumatic  Pulmonary/Chest: Diminished breath sounds bilaterally.   No rubs or chest wall tenderness or dullness to percussion  Cardiovascular: normal rate, normal S1 and S2, no gallops, intact distal pulses and no carotid bruits  Abdomen: soft, non-tender, non-distended, normal bowel sounds, no masses or organomegaly  Extremities: Edema bilaterally. Neurologic: No focal findings. Recent Labs     03/22/21  0654   WBC 8.4   RBC 2.69*   HGB 7.9*   HCT 25.6*   PLT 99*   MCV 95.2*   MCH 29.4   MCHC 30.9*   RDW 18.2*      Recent Labs     03/22/21  0654 03/23/21  0328 03/24/21  0441    143 141   K 5.2* 4.1 4.1    104 101   CO2 27 28 30*   BUN 45* 46* 44*   CREATININE 2.0* 2.0* 1.9*   CALCIUM 7.8* 8.3* 8.2*   GLUCOSE 166* 128* 126*      No results for input(s): PHART, NND9NBC, PO2ART, PMR6SBY, T6NLTREH, BEART in the last 72 hours. Recent Labs     03/22/21  0654 03/23/21  0328 03/24/21  0441   AST 16  --   --    ALT 16  --   --    ALKPHOS 75  --   --    BILITOT 0.6  --   --    CALCIUM 7.8* 8.3* 8.2*   PROBNP  --  1,510  --    INR  --  1.18  --      No results for input(s): BC, LABGRAM, CULTRESP, BFCX in the last 72 hours. Radiograph: Xr Chest (2 Vw)    Result Date: 3/22/2021  Impression: Fluid overload. Signed by Dr Germania Rangel on 3/22/2021 9:34 AM    Us Spleen    Result Date: 3/20/2021  1. Normal size spleen.  Signed by Dr Rober Hill on 3/20/2021 12:07 PM       My radiograph interpretation/independent review of imaging:     Problem list generated by [facility]:  Hospital Problems           Last Modified POA    * (Principal) Acute on chronic respiratory failure ([facility] 3/15/2021 Yes    Bilateral carotid artery stenosis 3/15/2021 Yes    Atherosclerosis of native artery of both lower extremities with intermittent claudication ([facility] 3/15/2021 Yes    Primary osteoarthritis of left knee 3/15/2021 Yes    Arthritis of knee 3/15/2021 Yes    Essential hypertension 3/15/2021 Yes    Pure hypercholesterolemia 3/15/2021 Yes    Iron deficiency anemia 3/15/2021 Yes    GERD (gastroesophageal reflux disease) 3/15/2021 Yes    CHF (congestive heart failure) ([facility] 3/15/2021 Yes    Thrombocytopenia ([facility] 3/16/2021 Yes    History of bladder cancer 3/15/2021 Yes    Acute respiratory failure ([facility] 3/15/2021 Yes    Chronic midline low back pain without sciatica 3/16/2021 Yes    Chronic kidney disease 3/16/2021 Yes    Atrial fibrillation ([facility] 3/16/2021 Yes    Vitamin D deficiency 3/16/2021 Yes    Anemia 3/16/2021 Yes    Alcohol use 3/16/2021 Yes    Pacemaker 3/16/2021 Yes           Pulmonary Assessment/Plan:     1. Acute on chronic congestive heart failure with bilateral pleural effusions improved with diuresis. Change to PO lasix. 2. Chronic hypercapnic resp failure will benefit from NIV at home. 3. Worsening shortness of breath due to the above improved with diuresis. 4. Atrial fibrillation currently stable. 5. Post pacemaker placement for sinus pause stable. 6. Dyspnea due to the above significantly improved with diuresis. 7. Continue rehab.           Electronically signed by Marylene Glance, MD on 03/24/21 at 4:06 PM

## 2021-03-24 NOTE — PROGRESS NOTES
Occupational Therapy  Facility/Department: [facility] 8 REHAB UNIT  Daily Treatment Note  NAME: Yury Patrick  : 1940  MRN: [id number]    Date of Service: 3/24/2021    Discharge Recommendations:  Continue to assess pending progress       Assessment   Performance deficits / Impairments: Decreased functional mobility ; Decreased endurance;Decreased strength;Decreased ROM; Decreased balance;Decreased ADL status; Decreased high-level IADLs  Assessment: Pt's O2 stats decreased to 82% on 3 L O2 post short distance ambulation. Increased O2 to 4 L O2. His O2 sat decreased to 87% during 2nd occasion, but increased to 90% after rest and pursed lip breathing. Kept on 4 L O2. Treatment Diagnosis: Pneumonia, recent pacemaker (3-13-21)  OT Education: Energy Conservation;Home Exercise Program  Activity Tolerance  Activity Tolerance: Patient Tolerated treatment well  Safety Devices  Safety Devices in place: Yes(Left with PT)  Type of devices: Left in chair         Patient Diagnosis(es): There were no encounter diagnoses. has a past medical history of Acute liver failure without hepatic coma, Back pain, Bladder cancer (HCC), Blood circulation, collateral, Carotid arterial disease ([address] CKD (chronic kidney disease), stage II, COPD with acute lower respiratory infection ([address] GERD (gastroesophageal reflux disease), Hyperlipidemia, Hypertension, Hypertension, Palliative care patient, Pneumonia due to infectious organism, Primary osteoarthritis of left knee, PVD (peripheral vascular disease) ([address] Tremor, and Type 2 diabetes mellitus with complication, without long-term current use of insulin ([address] has a past surgical history that includes back surgery;  Tonsillectomy and adenoidectomy; Colonoscopy (?); pr colonoscopy flx dx w/collj spec when pfrmd (N/A, 2017); pr revise median n/carpal tunnel surg (Left, 2018); pr thromboendartectmy neck,neck incis (Left, 2018); vascular surgery (2015); vascular surgery (01/13/2015); vascular surgery (03/11/2014); vascular surgery (01/18/2013); pr total knee arthroplasty (Left, 10/15/2018); vascular surgery (10/30/2018); vascular surgery (12/17/2018); Cystoscopy (Bilateral, 12/19/2018); and Cardiac catheterization (03/23/2021).     Restrictions  Restrictions/Precautions  Restrictions/Precautions: Surgical Protocols, Fall Risk  Implants present? : Pacemaker  Position Activity Restriction  Other position/activity restrictions: PACEMAKER RESTRICTIONS TO LUE   Subjective       03/24/21 1345   Vital Signs   Pulse 79   Oxygen Therapy   SpO2 (!) 82 %  (Increased O2 to 4 L O2 after 2x of O2 sats decreased to 82% after short distance functional mobility)   Pulse Oximeter Device Mode Intermittent   Pulse Oximeter Device Location Left;Finger   O2 Device Nasal cannula   O2 Flow Rate (L/min) 3 L/min     General  Chart Reviewed: Yes, Orders  Patient assessed for rehabilitation services?: Yes  Additional Pertinent Hx: Chronic low back pain, ETOH use, bladder cancer, home O2 prn  Family / Caregiver Present: No  Diagnosis: Respiratory failure with hypoxia, recen pacemaker placement--3/13  Vital Signs  Pulse: 79  Oxygen Therapy  SpO2: (!) 87 %(Increased to 90% after a few minutes of rest and pursed lip breathing)  Pulse Oximeter Device Mode: Intermittent  Pulse Oximeter Device Location: Left;Finger  O2 Device: Nasal cannula  O2 Flow Rate (L/min): 4 L/min   Objective             Balance  Standing Balance: Contact guard assistance  Functional Mobility  Functional - Mobility Device: Rolling Walker  Activity: Other;Retrieve items;Transport items  Functional Mobility Comments: Short distance x3 occasions     Transfers  Sit to stand: Contact guard assistance  Stand to sit: Contact guard assistance        Type of ROM/Therapeutic Exercise  Type of ROM/Therapeutic Exercise: Cane/Wand  Comment: 1#; 3 sets of 10 in all planes within pacemaker precautions     Plan   Plan  Specific instructions for Next Treatment: AE, endurance training  Current Treatment Recommendations: Strengthening, Patient/Caregiver Education & Training, Home Management Training, Equipment Evaluation, Education, & procurement, Balance Training, Self-Care / ADL, Safety Education & Training, Endurance Training, Functional Mobility Training       Goals  Short term goals  Time Frame for Short term goals: 1 week  Short term goal 1: pt will complete LB dressing with AE prn with CGA  Short term goal 2: pt will complete overall toileting with CGA  Short term goal 3: pt will complete simple ambulatory home making task with CGA while maintaining pacemaker precautions  Short term goal 4: pt will complete overall bathing with CGA  Short term goal 5: pt will complete 1-2 handed static standing act for 2 mins with supervision  Short term goal 6: pt will complete HEP x 5 occasions within range of pacemaker precautions to improve strength and endurance for ADLs  Long term goals  Time Frame for Long term goals : 2 weeks  Long term goal 1: pt will complete overall dressing with modified independence  Long term goal 2: pt will complete overall toileting with modified independence  Long term goal 3: pt will complete overall bathing with modified independence  Long term goal 4: pt will complete simple ambulatory home making task with modified independence  Long term goal 5: pt will complete HEP with independence  Long term goals 6: pt verbalize DME for home       Therapy Time   Individual Concurrent Group Co-treatment   Time In 1345         Time Out 1430         Minutes 45         Timed Code Treatment Minutes: [address], OT

## 2021-03-24 NOTE — PROGRESS NOTES
Visited with pt in PT gym. Pt's physical therapist was charting and this  took a moment to say hello to him. Pt appeared better, perhaps a little stronger, and said he was better. Provided spiritual care with sustaining presence, nurtured hope, and support. Pt expressed gratitude for spiritual care.     Electronically signed by Funmi Frausto on 3/24/2021 at 3:06 PM

## 2021-03-24 NOTE — PLAN OF CARE
Problem: Falls - Risk of:  Goal: Will remain free from falls  Description: Will remain free from falls  3/23/2021 2335 by Jl Smith RN  Outcome: Ongoing  3/23/2021 1555 by Sierra Perkins RN  Outcome: Ongoing  Goal: Absence of physical injury  Description: Absence of physical injury  3/23/2021 2335 by Jl Smith RN  Outcome: Ongoing  3/23/2021 1555 by Sierra Perkins RN  Outcome: Ongoing     Problem: IP BLADDER/VOIDING  Goal: LTG - patient will complete bladder elimination  3/23/2021 2335 by Jl Smith RN  Outcome: Ongoing  3/23/2021 1555 by Sierra Perkins RN  Outcome: Ongoing  Goal: LTG - Patient will utilize adaptive techniques/equipment to complete bladder elimination  3/23/2021 2335 by Jl Smith RN  Outcome: Ongoing  3/23/2021 1555 by Sierra Perkins RN  Outcome: Ongoing  Goal: LTG - patient will achieve acceptable level of continence  3/23/2021 2335 by Jl Smith RN  Outcome: Ongoing  3/23/2021 1555 by Sierra Perkins RN  Outcome: Ongoing  Goal: STG - Patient demonstrates ability to take care of indwelling catheter  3/23/2021 2335 by Jl Smith RN  Outcome: Ongoing  3/23/2021 1555 by Sierra Perkins RN  Outcome: Ongoing  Goal: STG - patient demonstrates self-cath technique using clean technique and care of the catheter  3/23/2021 2335 by Jl Smith RN  Outcome: Ongoing  3/23/2021 1555 by Sierra Perkins RN  Outcome: Ongoing  Goal: STG - Patient demonstrates no accidents  3/23/2021 2335 by Jl Smith RN  Outcome: Ongoing  3/23/2021 1555 by Sierra Perkins RN  Outcome: Ongoing  Goal: STG - Patient will state signs and symptoms of UTI  3/23/2021 2335 by Jl Smith RN  Outcome: Ongoing  3/23/2021 1555 by Sierra Perkins RN  Outcome: Ongoing  Goal: STG - patient will be able to empty bladder  3/23/2021 2335 by Jl Smith RN  Outcome: Ongoing  3/23/2021 1555 by Sierra Perkins RN  Outcome: Ongoing  Goal: STG - Patient demonstrates understanding of self catheterization schedule by completing task on time  3/23/2021 2335 by Barrett Kilpatrick RN  Outcome: Ongoing  3/23/2021 1555 by Paulino Guerra RN  Outcome: Ongoing  Goal: STG - patient participates in bladder program by expressing need to void  3/23/2021 2335 by Barrett Kilpatrick RN  Outcome: Ongoing  3/23/2021 1555 by Paulino Guerra RN  Outcome: Ongoing  Goal: STG - Patient verbalizes understanding of catheter care indwelling/intermittent  3/23/2021 2335 by Barrett Kilpatrick RN  Outcome: Ongoing  3/23/2021 1555 by Paulino Guerra RN  Outcome: Ongoing  Goal: STG - patient participates in bladder program by adhering to implemented toileting schedule  3/23/2021 2335 by Barrett Kilpatrick RN  Outcome: Ongoing  3/23/2021 1555 by Paulino Guerra RN  Outcome: Ongoing     Problem: IP BOWEL ELIMINATION  Goal: LTG - patient will utilize adaptive techniques/equipment to complete bowel elimination  3/23/2021 2335 by Barrett Kilpatrick RN  Outcome: Ongoing  3/23/2021 1555 by Paulino Guerra RN  Outcome: Ongoing  Goal: LTG - patient will have regular and routine bowel evacuation  3/23/2021 2335 by Barrett Kilpatrick RN  Outcome: Ongoing  3/23/2021 1555 by Paulino Guerra RN  Outcome: Ongoing  Goal: STG - patient will be accident free  3/23/2021 2335 by Barrett Kilpatrick RN  Outcome: Ongoing  3/23/2021 1555 by Paulino Guerra RN  Outcome: Ongoing  Goal: STG - Patient demonstrates care and management of ostomy bag  3/23/2021 2335 by Barrett Kilpatrick RN  Outcome: Ongoing  3/23/2021 1555 by Paulino Guerra RN  Outcome: Ongoing  Goal: STG - Patient participates in bowel management program  3/23/2021 2335 by Barrett Kilpatrick RN  Outcome: Ongoing  3/23/2021 1555 by Paulino Guerra RN  Outcome: Ongoing  Goal: STG - patient maintains skin integrity  3/23/2021 2335 by Barrett Kilpatrick RN  Outcome: Ongoing  3/23/2021 1555 by Paulino Guerra RN  Outcome: Ongoing  Goal: STG - Patient will verbalize signs and symptoms of constipation and how to prevent/alleviate  3/23/2021 2335 by Barrett Kilpatrick RN  Outcome: Ongoing  3/23/2021 1555 by Aisha Blunt RN  Outcome: Ongoing  Goal: STG - patient will be continent of stool  3/23/2021 2335 by Bailee Dominique RN  Outcome: Ongoing  3/23/2021 1555 by Aisha Blunt RN  Outcome: Ongoing  Goal: STG - Patient completes digital stimulation technique  3/23/2021 2335 by Bailee Dominique RN  Outcome: Ongoing  3/23/2021 1555 by Aisha Blunt RN  Outcome: Ongoing  Goal: STG - Patient verbalizes knowledge about relationship between diet, fluid intake, activity and medication on constipation  3/23/2021 2335 by Bailee Dominique RN  Outcome: Ongoing  3/23/2021 1555 by Aisha Blunt RN  Outcome: Ongoing     Problem: IP BREATHING  Goal: LTG - patient will mobilize secretions and maintain airway  3/23/2021 2335 by Bailee Dominique RN  Outcome: Ongoing  3/23/2021 1555 by Aisha Blunt RN  Outcome: Ongoing  Goal: LTG - Patient/caregiver will demonstrate/perform proper techniques to maintain patent airway  3/23/2021 2335 by Bailee Dominique RN  Outcome: Ongoing  3/23/2021 1555 by Aisha Blunt RN  Outcome: Ongoing  Goal: LTG - patient/caregiver will demonstrate/perform improved or stable self care abilities within physical limitations  3/23/2021 2335 by Bailee Dominique RN  Outcome: Ongoing  3/23/2021 1555 by Aisha Blunt RN  Outcome: Ongoing  Goal: STG - Patient/caregiver will maintain patent airway  3/23/2021 2335 by Bailee Dominique RN  Outcome: Ongoing  3/23/2021 1555 by Aisha Blunt RN  Outcome: Ongoing  Goal: STG - respiratory rate and effort will be within normal limits for the patient  3/23/2021 2335 by Bailee Dominique RN  Outcome: Ongoing  3/23/2021 1555 by Aisha Blunt RN  Outcome: Ongoing  Goal: STG - patient/caregiver will be able to verbalize oxygen safety precautions  3/23/2021 2335 by Bailee Dominique RN  Outcome: Ongoing  3/23/2021 1555 by Aisha Blunt RN  Outcome: Ongoing  Goal: STG - Patient/caregiver demonstrates correct suctioning technique  3/23/2021 2335 by Bailee Borer, RN  Outcome: Ongoing  3/23/2021 155 by Ольга Dyer RN  Outcome: Ongoing  Goal: STG - patient will utilize incentive spirometer  3/23/2021 2335 by Vidya Patino RN  Outcome: Ongoing  3/23/2021 1555 by Ольга Dyer RN  Outcome: Ongoing  Goal: STG - Patient performs or directs assisted coughing  3/23/2021 2335 by Vidya Patino RN  Outcome: Ongoing  3/23/2021 1555 by Ольга Dyer RN  Outcome: Ongoing  Goal: STG - patient can administer MDI's  3/23/2021 2335 by Vidya Patino RN  Outcome: Ongoing  3/23/2021 1555 by Ольга Dyer RN  Outcome: Ongoing  Goal: STG - Patient can utilize incentive spirometer  3/23/2021 2335 by Vidya Patino RN  Outcome: Ongoing  3/23/2021 1555 by Ольга Dyer RN  Outcome: Ongoing  Goal: STG - family can complete suctioning  3/23/2021 2335 by Vidya Patino RN  Outcome: Ongoing  3/23/2021 1555 by Ольга Dyer RN  Outcome: Ongoing     Problem: SAFETY  Goal: LTG - patient will adhere to hip precautions during ADL's and transfers  3/23/2021 2335 by Vidya Patino RN  Outcome: Ongoing  3/23/2021 1555 by Ольга Dyer RN  Outcome: Ongoing  Goal: LTG - Patient will demonstrate safety requirements appropriate to situation/environment  3/23/2021 2335 by Vidya Patino RN  Outcome: Ongoing  3/23/2021 1555 by Ольга Dyer RN  Outcome: Ongoing  Goal: LTG - patient will utilize safety techniques  3/23/2021 2335 by Vidya Patino RN  Outcome: Ongoing  3/23/2021 1555 by Ольга Dyer RN  Outcome: Ongoing  Goal: STG - patient locks brakes on wheelchair  3/23/2021 2335 by Vidya Patino RN  Outcome: Ongoing  3/23/2021 1555 by Ольга Dyer RN  Outcome: Ongoing  Goal: STG - Patient uses call light consistently to request assistance with transfers  3/23/2021 2335 by Vidya Patino RN  Outcome: Ongoing  3/23/2021 1555 by Ольга Dyer RN  Outcome: Ongoing  Goal: STG - patient uses gait belt during all transfers  3/23/2021 2335 by Vidya Jurist, RN  Outcome: Ongoing  3/23/2021 1555 by Ольга Dyer RN  Outcome: Ongoing

## 2021-03-24 NOTE — PROGRESS NOTES
Occupational Therapy  Facility/Department: [facility] REHAB UNIT  Daily Treatment Note  NAME: Teodora Thorne  : 1940  MRN: [id number]    Date of Service: 3/24/2021    Discharge Recommendations:  Home with Home health OT       Assessment   Performance deficits / Impairments: Decreased functional mobility ; Decreased endurance;Decreased strength;Decreased ROM; Decreased balance;Decreased ADL status; Decreased high-level IADLs  OT Education: ADL Adaptive Strategies  Activity Tolerance  Activity Tolerance: Patient Tolerated treatment well  Safety Devices  Safety Devices in place: Yes  Type of devices: Left in chair;Chair alarm in place;Call light within reach         Patient Diagnosis(es): There were no encounter diagnoses. has a past medical history of Acute liver failure without hepatic coma, Back pain, Bladder cancer (HCC), Blood circulation, collateral, Carotid arterial disease ([address] CKD (chronic kidney disease), stage II, COPD with acute lower respiratory infection ([address] GERD (gastroesophageal reflux disease), Hyperlipidemia, Hypertension, Hypertension, Palliative care patient, Pneumonia due to infectious organism, Primary osteoarthritis of left knee, PVD (peripheral vascular disease) ([address] Tremor, and Type 2 diabetes mellitus with complication, without long-term current use of insulin ([address] has a past surgical history that includes back surgery; Tonsillectomy and adenoidectomy; Colonoscopy (?); pr colonoscopy flx dx w/collj spec when pfrmd (N/A, 2017); pr revise median n/carpal tunnel surg (Left, 2018); pr thromboendartectmy neck,neck incis (Left, 2018); vascular surgery (2015); vascular surgery (2015); vascular surgery (2014); vascular surgery (2013); pr total knee arthroplasty (Left, 10/15/2018); vascular surgery (10/30/2018); vascular surgery (2018);  Cystoscopy (Bilateral, 2018); and Cardiac catheterization (03/23/2021). Restrictions  Restrictions/Precautions  Restrictions/Precautions: Surgical Protocols, Fall Risk  Implants present? : Pacemaker  Position Activity Restriction  Other position/activity restrictions: PACEMAKER RESTRICTIONS TO LUE   Subjective   General  Chart Reviewed: Yes, Orders  Patient assessed for rehabilitation services?: Yes  Additional Pertinent Hx: Chronic low back pain, ETOH use, bladder cancer, home O2 prn  Family / Caregiver Present: No  Diagnosis: Respiratory failure with hypoxia, recen pacemaker placement--3/13  Pain Assessment  Pain Assessment: 0-10  Pain Level: 0  Pre Treatment Pain Screening  Pain at present: 0  Scale Used: Numeric Score  Vital Signs  Patient Currently in Pain: No   Objective    Balance  Sitting Balance: Supervision  Standing Balance: Contact guard assistance  Functional Mobility  Functional - Mobility Device: Rolling Walker  Activity: To/from bathroom; Other  Assist Level: Contact guard assistance  Bed mobility  Supine to Sit: Stand by assistance  Transfers  Stand Step Transfers: Contact guard assistance  Sit to stand: Contact guard assistance  Stand to sit: Contact guard assistance      03/24/21 0900   [address] assistance   Comment assistance with pants up   CARE Score 3   Shower/Bathe Self   Assistance Needed Partial/moderate assistance   Comment showers, min A   CARE Score 3   Upper Body Dressing   Assistance Needed Supervision or touching assistance   Comment vc   CARE Score 4   Lower Body Dressing   Assistance Needed Partial/moderate assistance   CARE Score 3      03/24/21 0900   Toilet Transfer   Assistance Needed Supervision or touching assistance   Comment CGA, RW   CARE Score 4     Plan   Plan  Specific instructions for Next Treatment: AE, endurance training  Current Treatment Recommendations: Strengthening, Patient/Caregiver Education & Training, Home Management Training, Equipment Evaluation, Education, & procurement, Balance Training, Self-Care / ADL, Safety Education & Training, Endurance Training, Functional Mobility Training    Goals  Short term goals  Time Frame for Short term goals: 1 week  Short term goal 1: pt will complete LB dressing with AE prn with CGA  Short term goal 2: pt will complete overall toileting with CGA  Short term goal 3: pt will complete simple ambulatory home making task with CGA while maintaining pacemaker precautions  Short term goal 4: pt will complete overall bathing with CGA  Short term goal 5: pt will complete 1-2 handed static standing act for 2 mins with supervision  Short term goal 6: pt will complete HEP x 5 occasions within range of pacemaker precautions to improve strength and endurance for ADLs  Long term goals  Time Frame for Long term goals : 2 weeks  Long term goal 1: pt will complete overall dressing with modified independence  Long term goal 2: pt will complete overall toileting with modified independence  Long term goal 3: pt will complete overall bathing with modified independence  Long term goal 4: pt will complete simple ambulatory home making task with modified independence  Long term goal 5: pt will complete HEP with independence  Long term goals 6: pt verbalize DME for home       Therapy Time   Individual Concurrent Group Co-treatment   Time In 0900         Time Out 1000         Minutes 60         Timed Code Treatment Minutes: 60 Minutes       Electronically signed by Tiffanie Mora OT on 3/24/2021 at 2:12 PM

## 2021-03-24 NOTE — PROGRESS NOTES
Patient:   Odell Castro  MR#:    [id number]   Room:    167/349-27   YOB: 1940  Date of Progress Note: 3/24/2021  Time of Note                           9:23 AM  Consulting Physician:   Grace Cardenas M.D. Attending Physician:  Grace Cardenas MD     Chief complaint Respiratory failure     S:This 79 y.o. male  with a past medical history of bladder cancer, HTN, CKD stage III, PVD, hyperlipidemia,CAD,COPD,CHF has oxygen at home but doesn't wear if often,ETOH use drinks 2 glasses of wine every night and former smoker. He presented to [facility] ER on 3/6/21 with shortness of breath. CXR done showed increased pulmonary vascular congestion and CT of chest showed bilateral pleural effusion. He was admitted to the hospitalist service with Acute on chronic hypoxemic and hypercapnic respiratory, COPD exacerbation and possible recurrent pneumonia. Pulmonology was consulted. He was seen by Dr. Evita Melvin, who didn't feel he had pneumoinia st this time, but did recommend Trilogy at discharge d/t patient not tolerating Bi-PAP for questionable sleep apnea. Patient had an episode of A-Fib with RVR. Cardiology was consulted. He was seen by Dr. Jeffry Roberts. He underwent cardiac catheterization by Dr. Liz Lazo on 3/10/21 revealing 100% occlusion right coronary artery which appears chronic in nature well collateralized from the left no significant disease on the left. Dr. Liz Lazo recommended placement of a dual-chamber pacemaker. Patient was in agreement and went or surgery on 3/13/21. He tolerated the procedure well. SPT was consulted to evaluate swallow. He was noted to have oropharyngeal phase dysphagia but no s/s of aspiration, he was placed on a regular diet with thin liquids. No complaints this morning although has numerous questions.     REVIEW OF SYSTEMS:  Constitutional: No fevers No chills  Neck:No stiffness  Respiratory: some shortness of breath  Cardiovascular: No chest pain No palpitations  Gastrointestinal: No abdominal pain    Genitourinary: No Dysuria  Neurological: No headache, no confusion    Past Medical History:      Diagnosis Date    Acute liver failure without hepatic coma 10/23/2018    Back pain     \"with tired legs as a result\"    Bladder cancer ([facility] 12/19/2018    Blood circulation, collateral     Carotid arterial disease ([address]     recent surgery    CKD (chronic kidney disease), 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/2018    COPD with acute lower respiratory infection ([address] 2/24/2021    GERD (gastroesophageal reflux disease)     Hyperlipidemia     Hypertension     Hypertension     Palliative care patient 10/23/2018    Pneumonia due to infectious organism 11/06/2018    Primary osteoarthritis of left knee 10/14/2018    PVD (peripheral vascular disease) (HCC)     Tremor     Tremor on Right side x 1-2 weeks per stepdaughter    Type 2 diabetes mellitus with complication, without long-term current use of insulin ([facility] 1/21/2021       Past Surgical History:      Procedure Laterality Date    BACK SURGERY      CARDIAC CATHETERIZATION  03/23/2021    100% RCA    COLONOSCOPY  2007?     CYSTOSCOPY Bilateral 12/19/2018    CYSTOSCOPY, BIOSPY FULGURATION OF BLADDER TUMOR POSSIBLE TURBT, RETROGRADE PYELOGRAM performed by Stacie Luke MD at [address] COLONOSCOPY FLX DX W/COLLJ SPEC WHEN PFRMD N/A 09/11/2017    Dr Baig Many internal hemorrhoids, diverticular disease-HP-No recall (age)   [place] REVISE MEDIAN N/CARPAL TUNNEL SURG Left 07/18/2018    OPEN CARPAL TUNNEL RELEASE performed by Chloe Larry MD at [address] Left 08/28/2018    LEFT CAROTID ENDARTERECTOMY WITH VEIN PATCH ANGIOPLASTY AND COMPLETION ANGIOGRAM performed by Johnathon Belcher MD at [place] [address] Left 10/15/2018    LEFT COMPLEX TOTAL KNEE ARTHROPLASTY performed by Chloe Larry MD at [address] [place] [address] 04/21/2015    CHARLY BESS Ultrasound guided access of left common femoral artery. Aortogram.Diagnostic right lower extremity arteriogram.Radiologic supervision and interpretation.  VASCULAR SURGERY  01/13/2015    Klever Menendez M.D Atherectomy,angioplasty,and stenting of left superficial femoral artery.  VASCULAR SURGERY  03/11/2014    Kelver Menendez M.D. Ultrasound-guided access of right common femoral artery. Aortogram.Left lower extremity arteriogram.Atherectomy and angioplasty of left superficial femoral artery. Radiologic supervision and interpretation.  VASCULAR SURGERY  01/18/2013    Klever BESS Aortogram.Multistation arteriogram right lower extremity. Laser atherectomy and angioplasty of right superficial femoral artery. Selective catheterization of right tibioperoneal trunk. Angioplasty of peroneal artery and tibioperoneal trunk.  VASCULAR SURGERY  10/30/2018    [facility]. Ultrasound guided cannulation of right internal vein. Placement of right internal jugular vein tunneled dialysis catheter bard equistream xk 23cm tip to cuff    VASCULAR SURGERY  12/17/2018    [facility]. Removal of tunneled dilaysis catheter right internal jugular vein.        Medications in Hospital:      Current Facility-Administered Medications:     furosemide (LASIX) injection 40 mg, 40 mg, Intravenous, BID, Terence Mcintosh MD, 40 mg at 03/24/21 4075    budesonide (PULMICORT) nebulizer suspension 500 mcg, 0.5 mg, Nebulization, BID, Terence Mcintosh MD, 500 mcg at 03/24/21 0604    Arformoterol Tartrate (BROVANA) nebulizer solution 15 mcg, 15 mcg, Nebulization, BID, Terence Mcintosh MD, 15 mcg at 03/24/21 0603    0.9 % sodium chloride infusion, , Intravenous, PRN, Melanie Benavides MD    linaclotide [facility]) capsule 145 mcg, 145 mcg, Oral, QAM AC, Ramu Stinson MD, 145 mcg at 03/24/21 0548    sodium chloride flush 0.9 % injection 10 mL, 10 mL, Intravenous, BID, Ramu Stinson MD, 10 mL at 03/24/21 0840    ondansetron ([place]) injection 4 mg, 4 mg, Intravenous, Q6H PRN, Jessica Lopez MD    sodium chloride flush 0.9 % injection 10 mL, 10 mL, Intravenous, PRN, Jessica Lopez MD, 10 mL at 03/22/21 1650    [DISCONTINUED] acetaminophen (TYLENOL) tablet 650 mg, 650 mg, Oral, Q6H PRN **OR** acetaminophen (TYLENOL) suppository 650 mg, 650 mg, Rectal, Q6H PRN, Jessica Lopez MD    dextrose 5 % solution, 100 mL/hr, Intravenous, PRN, Jessica Lopez MD    dextrose 50 % IV solution, 12.5 g, Intravenous, PRN, Jessica Lopez MD    glucagon (rDNA) injection 1 mg, 1 mg, Intramuscular, PRN, Jessica Lopez MD    glucose (GLUTOSE) 40 % oral gel 15 g, 15 g, Oral, PRN, Jessica Lopez MD    amiodarone (CORDARONE) tablet 200 mg, 200 mg, Oral, BID, Jessica Lopez MD, 200 mg at 03/24/21 1367    aspirin EC tablet 81 mg, 81 mg, Oral, Daily, Jessica Lopez MD, 81 mg at 03/24/21 7872    atorvastatin (LIPITOR) tablet 20 mg, 20 mg, Oral, Daily, Jessica Lopez MD, 20 mg at 03/24/21 5710    bisacodyl (DULCOLAX) EC tablet 5 mg, 5 mg, Oral, Daily PRN, Jessica Lopez MD    calcium carbonate (TUMS) chewable tablet 500 mg, 500 mg, Oral, TID PRN, Jessica Lopez MD    ferrous sulfate (IRON 325) tablet 325 mg, 325 mg, Oral, BID , Jessica Lopez MD, 325 mg at 59/49/34 7660    folic acid (FOLVITE) tablet 1 mg, 1 mg, Oral, Daily, Jessica Lopez MD, 1 mg at 03/24/21 4593    guaiFENesin (ROBITUSSIN) 100 MG/5ML liquid 200 mg, 200 mg, Oral, Q4H PRN, Jessica Lopez MD    insulin lispro (HUMALOG) injection vial 0-6 Units, 0-6 Units, Subcutaneous, TID WC, Jessica Lopez MD, 1 Units at 03/24/21 0840    insulin lispro (HUMALOG) injection vial 0-3 Units, 0-3 Units, Subcutaneous, Nightly, Jessica Lopez MD, 1 Units at 03/23/21 2056    ipratropium-albuterol (DUONEB) nebulizer solution 1 ampule, 1 ampule, Inhalation, Q4H PRN, THERAPY     CURRENT IRF-RHONDA SCORES  Eating: CARE Score: 5     Oral Hygiene: CARE Score: 5         Toileting: CARE Score: 2  Comment: assistance with pants up/down due to increased fatigue this session      Shower/Bathe: CARE Score: 3  Comment: Shower Mod A        Upper Body Dressing: CARE Score: 3  Comment: vc for tech, min A to pull over head       Lower Body Dressing: CARE Score: 3  Comment: Able to thread pants over feet, but was fatigued post shower to pull pants up.        Footwear: CARE Score: 2  Comment: Did not attempt sock aide due to fatigue. He is able to don socks in w/c, but not able to post shower due to fatigue.        Toilet Transfers: CARE Score: 3  Comment:  Mod A on toilet without BSC, Min A with BSC over it.        Picking Up Object:  CARE Score: 88           UE Functioning:  L shld limited to 90 deg elevaiton due to recent pacemaker, WFL distally  R WFLs     Pain Assessment:  Pain Level: 0     STGs:  Short term goals  Time Frame for Short term goals: 1 week  Short term goal 1: pt will complete LB dressing with AE prn with CGA  Short term goal 2: pt will complete overall toileting with CGA  Short term goal 3: pt will complete simple ambulatory home making task with CGA while maintaining pacemaker precautions  Short term goal 4: pt will complete overall bathing with CGA  Short term goal 5: pt will complete 1-2 handed static standing act for 2 mins with supervision  Short term goal 6: pt will complete HEP x 5 occasions within range of pacemaker precautions to improve strength and endurance for ADLs     LTGs:  Long term goals  Time Frame for Long term goals : 2 weeks  Long term goal 1: pt will complete overall dressing with modified independence  Long term goal 2: pt will complete overall toileting with modified independence  Long term goal 3: pt will complete overall bathing with modified independence  Long term goal 4: pt will complete simple ambulatory home making task with modified independence  Long term goal 5: pt will complete HEP with independence  Long term goals 6: pt verbalize DME for home     Assessment:  Performance deficits / Impairments: Decreased functional mobility , Decreased endurance, Decreased strength, Decreased ROM, Decreased balance, Decreased ADL status, Decreased high-level IADLs                        NUTRITION  Current Wt: Weight: 250 lb (113.4 kg) / Body mass index is 33.91 kg/m². Admission Wt: Admission Body Weight: 250 lb (113.4 kg)  Oral Diet Orders: Cardiac     Pt remains adequately nourished AEB adequate po intake (% most meals) and stable wt since admission. Continue current POC. Please see nutrition notes for further details.       RECORD REVIEW: Previous medical records, medications were reviewed at today's visit    IMPRESSION:   1. Respiratory failure. CHF/COPD-O2/Duonebs PRN/tapering prednisone/Trilogy  2. Atrial fibrillation/S/P PPM-Amiodarone/Procardia XL/metorprolol  3. CAD/PVD-ASA/statin  4. Hyperlipidemia-on statin  5. Anemia-on iron. Status post 1 unit packed red blood cells with 3/12 and 3/20. Followed by hematology. 6. HTN-on meds monitor  7. GI-bowel regimen/PPI  8. BPH/history of bladder cancer-on meds  9. Vitamin D deficiency-on Vitamin D  10. DVT prophylaxis-SCDs  11. ETOH use-folic acid  12. Chronic kidney disease-monitor. Stable currently  13. Thrombocytopenia-monitor  14. Back pain/arthritis-Tylenol  15. PT/OT/Speech  16. Shortness of air-repeat chest x-ray and consult pulmonology. Thrombocytopenia [id number] following  17. Pulmonary edema. Better  appeciate hospitalist/pulmonology/h/o assistance. Watch renal fct. Now on Lasix.       Expected duration and frequency therapy: 180 minutes per day, 5 days per week      ELOS:3/29

## 2021-03-25 PROBLEM — E66.811 CLASS 1 OBESITY IN ADULT: Status: ACTIVE | Noted: 2021-03-25

## 2021-03-25 PROBLEM — E66.9 CLASS 1 OBESITY IN ADULT: Status: ACTIVE | Noted: 2021-03-25

## 2021-03-25 LAB
ALBUMIN SERPL-MCNC: 3 G/DL (ref 3.5–5.2)
ALP BLD-CCNC: 112 U/L (ref 40–130)
ALT SERPL-CCNC: 13 U/L (ref 5–41)
ANION GAP SERPL CALCULATED.3IONS-SCNC: 10 MMOL/L (ref 7–19)
AST SERPL-CCNC: 10 U/L (ref 5–40)
BASOPHILS ABSOLUTE: 0 K/UL (ref 0–0.2)
BASOPHILS RELATIVE PERCENT: 0.2 % (ref 0–1)
BILIRUB SERPL-MCNC: 0.8 MG/DL (ref 0.2–1.2)
BUN BLDV-MCNC: 44 MG/DL (ref 8–23)
CALCIUM SERPL-MCNC: 8.4 MG/DL (ref 8.8–10.2)
CHLORIDE BLD-SCNC: 101 MMOL/L (ref 98–111)
CO2: 32 MMOL/L (ref 22–29)
CREAT SERPL-MCNC: 2 MG/DL (ref 0.5–1.2)
EOSINOPHILS ABSOLUTE: 0 K/UL (ref 0–0.6)
EOSINOPHILS RELATIVE PERCENT: 0 % (ref 0–5)
GFR AFRICAN AMERICAN: 39
GFR NON-AFRICAN AMERICAN: 32
GLUCOSE BLD-MCNC: 153 MG/DL (ref 70–99)
GLUCOSE BLD-MCNC: 159 MG/DL (ref 74–109)
GLUCOSE BLD-MCNC: 175 MG/DL (ref 70–99)
GLUCOSE BLD-MCNC: 179 MG/DL (ref 70–99)
GLUCOSE BLD-MCNC: 229 MG/DL (ref 70–99)
HCT VFR BLD CALC: 28.1 % (ref 42–52)
HEMOGLOBIN: 8.4 G/DL (ref 14–18)
IMMATURE GRANULOCYTES #: 0 K/UL
LYMPHOCYTES ABSOLUTE: 0.4 K/UL (ref 1.1–4.5)
LYMPHOCYTES RELATIVE PERCENT: 8.5 % (ref 20–40)
MCH RBC QN AUTO: 29.5 PG (ref 27–31)
MCHC RBC AUTO-ENTMCNC: 29.9 G/DL (ref 33–37)
MCV RBC AUTO: 98.6 FL (ref 80–94)
MONOCYTES ABSOLUTE: 0.5 K/UL (ref 0–0.9)
MONOCYTES RELATIVE PERCENT: 9.5 % (ref 0–10)
NEUTROPHILS ABSOLUTE: 4.2 K/UL (ref 1.5–7.5)
NEUTROPHILS RELATIVE PERCENT: 81 % (ref 50–65)
PDW BLD-RTO: 18.2 % (ref 11.5–14.5)
PERFORMED ON: ABNORMAL
PLATELET # BLD: 104 K/UL (ref 130–400)
PMV BLD AUTO: 12.5 FL (ref 9.4–12.4)
POTASSIUM REFLEX MAGNESIUM: 4 MMOL/L (ref 3.5–5)
RBC # BLD: 2.85 M/UL (ref 4.7–6.1)
SODIUM BLD-SCNC: 143 MMOL/L (ref 136–145)
TOTAL PROTEIN: 4.9 G/DL (ref 6.6–8.7)
WBC # BLD: 5.2 K/UL (ref 4.8–10.8)

## 2021-03-25 PROCEDURE — 36415 COLL VENOUS BLD VENIPUNCTURE: CPT

## 2021-03-25 PROCEDURE — 80053 COMPREHEN METABOLIC PANEL: CPT

## 2021-03-25 PROCEDURE — 97535 SELF CARE MNGMENT TRAINING: CPT

## 2021-03-25 PROCEDURE — 6360000002 HC RX W HCPCS: Performed by: INTERNAL MEDICINE

## 2021-03-25 PROCEDURE — 85025 COMPLETE CBC W/AUTO DIFF WBC: CPT

## 2021-03-25 PROCEDURE — 6370000000 HC RX 637 (ALT 250 FOR IP): Performed by: HOSPITALIST

## 2021-03-25 PROCEDURE — 82947 ASSAY GLUCOSE BLOOD QUANT: CPT

## 2021-03-25 PROCEDURE — 2580000003 HC RX 258: Performed by: PSYCHIATRY & NEUROLOGY

## 2021-03-25 PROCEDURE — 1180000000 HC REHAB R&B

## 2021-03-25 PROCEDURE — 6370000000 HC RX 637 (ALT 250 FOR IP): Performed by: PSYCHIATRY & NEUROLOGY

## 2021-03-25 PROCEDURE — 97110 THERAPEUTIC EXERCISES: CPT

## 2021-03-25 PROCEDURE — 99232 SBSQ HOSP IP/OBS MODERATE 35: CPT | Performed by: PSYCHIATRY & NEUROLOGY

## 2021-03-25 PROCEDURE — 94640 AIRWAY INHALATION TREATMENT: CPT

## 2021-03-25 PROCEDURE — 2700000000 HC OXYGEN THERAPY PER DAY

## 2021-03-25 PROCEDURE — 97530 THERAPEUTIC ACTIVITIES: CPT

## 2021-03-25 PROCEDURE — 6370000000 HC RX 637 (ALT 250 FOR IP): Performed by: INTERNAL MEDICINE

## 2021-03-25 PROCEDURE — 97116 GAIT TRAINING THERAPY: CPT

## 2021-03-25 RX ADMIN — FOLIC ACID 1 MG: 1 TABLET ORAL at 07:38

## 2021-03-25 RX ADMIN — TAMSULOSIN HYDROCHLORIDE 0.4 MG: 0.4 CAPSULE ORAL at 07:38

## 2021-03-25 RX ADMIN — METOPROLOL SUCCINATE 100 MG: 50 TABLET, EXTENDED RELEASE ORAL at 07:37

## 2021-03-25 RX ADMIN — ARFORMOTEROL TARTRATE 15 MCG: 15 SOLUTION RESPIRATORY (INHALATION) at 18:38

## 2021-03-25 RX ADMIN — ASPIRIN 81 MG: 81 TABLET, FILM COATED ORAL at 07:38

## 2021-03-25 RX ADMIN — SODIUM CHLORIDE, PRESERVATIVE FREE 10 ML: 5 INJECTION INTRAVENOUS at 07:42

## 2021-03-25 RX ADMIN — INSULIN LISPRO 1 UNITS: 100 INJECTION, SOLUTION INTRAVENOUS; SUBCUTANEOUS at 16:51

## 2021-03-25 RX ADMIN — TAMSULOSIN HYDROCHLORIDE 0.4 MG: 0.4 CAPSULE ORAL at 21:08

## 2021-03-25 RX ADMIN — ATORVASTATIN CALCIUM 20 MG: 20 TABLET, FILM COATED ORAL at 07:38

## 2021-03-25 RX ADMIN — FUROSEMIDE 40 MG: 40 TABLET ORAL at 07:38

## 2021-03-25 RX ADMIN — BUDESONIDE 500 MCG: 0.5 SUSPENSION RESPIRATORY (INHALATION) at 18:38

## 2021-03-25 RX ADMIN — SODIUM CHLORIDE, PRESERVATIVE FREE 10 ML: 5 INJECTION INTRAVENOUS at 21:09

## 2021-03-25 RX ADMIN — NIFEDIPINE 60 MG: 60 TABLET, EXTENDED RELEASE ORAL at 07:37

## 2021-03-25 RX ADMIN — FERROUS SULFATE TAB 325 MG (65 MG ELEMENTAL FE) 325 MG: 325 (65 FE) TAB at 16:51

## 2021-03-25 RX ADMIN — PANTOPRAZOLE SODIUM 40 MG: 40 TABLET, DELAYED RELEASE ORAL at 07:38

## 2021-03-25 RX ADMIN — AMIODARONE HYDROCHLORIDE 200 MG: 200 TABLET ORAL at 07:38

## 2021-03-25 RX ADMIN — ARFORMOTEROL TARTRATE 15 MCG: 15 SOLUTION RESPIRATORY (INHALATION) at 06:24

## 2021-03-25 RX ADMIN — MICONAZOLE NITRATE: 2 POWDER TOPICAL at 07:46

## 2021-03-25 RX ADMIN — INSULIN LISPRO 1 UNITS: 100 INJECTION, SOLUTION INTRAVENOUS; SUBCUTANEOUS at 07:38

## 2021-03-25 RX ADMIN — FUROSEMIDE 40 MG: 40 TABLET ORAL at 16:51

## 2021-03-25 RX ADMIN — MICONAZOLE NITRATE: 2 POWDER TOPICAL at 21:09

## 2021-03-25 RX ADMIN — AMIODARONE HYDROCHLORIDE 200 MG: 200 TABLET ORAL at 21:08

## 2021-03-25 RX ADMIN — INSULIN LISPRO 2 UNITS: 100 INJECTION, SOLUTION INTRAVENOUS; SUBCUTANEOUS at 12:02

## 2021-03-25 RX ADMIN — BUDESONIDE 500 MCG: 0.5 SUSPENSION RESPIRATORY (INHALATION) at 06:25

## 2021-03-25 RX ADMIN — MICONAZOLE NITRATE: 2 POWDER TOPICAL at 12:04

## 2021-03-25 RX ADMIN — FERROUS SULFATE TAB 325 MG (65 MG ELEMENTAL FE) 325 MG: 325 (65 FE) TAB at 07:37

## 2021-03-25 RX ADMIN — LINACLOTIDE 145 MCG: 145 CAPSULE, GELATIN COATED ORAL at 05:39

## 2021-03-25 ASSESSMENT — PAIN SCALES - GENERAL
PAINLEVEL_OUTOF10: 0
PAINLEVEL_OUTOF10: 0

## 2021-03-25 NOTE — PROGRESS NOTES
Occupational Therapy     03/25/21 0815   General   Additional Pertinent Hx Chronic low back pain, ETOH use, bladder cancer, home O2 prn   Diagnosis Respiratory failure with hypoxia, recen pacemaker placement--3/13   Pain Assessment   Patient Currently in Pain No   Pain Assessment 0-10   Pain Level 0   ADL   Grooming Independent  (Oral hygiene and shaving.)   Balance   Sitting Balance Supervision   Standing Balance Contact guard assistance   Functional Mobility   Functional - Mobility Device Rolling Walker   Activity Other   Assist Level Contact guard assistance   Bed mobility   Sit to Supine Supervision   Transfers   Sit to stand Contact guard assistance   Stand to sit Contact guard assistance   Transfer Comments Cues for hand placement. Assessment   Performance deficits / Impairments Decreased functional mobility ; Decreased endurance;Decreased strength;Decreased ROM; Decreased balance;Decreased ADL status; Decreased high-level IADLs   Treatment Diagnosis Pneumonia, recent pacemaker (3-13-21)   Prognosis Good   Timed Code Treatment Minutes 45 Minutes   Activity Tolerance   Activity Tolerance Patient Tolerated treatment well   Safety Devices   Safety Devices in place Yes   Type of devices Call light within reach; Bed alarm in place   Plan   Current Treatment Recommendations Strengthening;Patient/Caregiver Education & Training;Home Management Training;Equipment Evaluation, Education, & procurement;Balance Training;Self-Care / ADL; Safety Education & Training; Endurance Training;Functional Mobility Training

## 2021-03-25 NOTE — PROGRESS NOTES
03/25/21 1100   Restrictions/Precautions   Restrictions/Precautions Surgical Protocols; Fall Risk   Implants present? Pacemaker   Pain Screening   Patient Currently in Pain No   Pain Assessment   Pain Assessment 0-10   Pain Level 0   Oxygen Therapy   SpO2 94 %   Pulse Oximeter Device Mode Intermittent   Pulse Oximeter Device Location Left;Finger   O2 Device Nasal cannula   O2 Flow Rate (L/min) 3 L/min   Bed Mobility   Supine to Sit Stand by assistance   Scooting Independent   Transfers   Sit to Stand Minimal Assistance   Stand to sit Contact guard assistance   Comment Cues for reaching behind when going from stand to sit. Ambulation   Ambulation? Yes   Ambulation 1   Surface level tile   Device Rolling Walker   Other Apparatus O2;Wheelchair follow  (3)   Assistance Contact guard assistance   Quality of Gait reciprocal, fatigues quickly. Breaks taken throughout ambulation. Gait Deviations Slow Nae;Decreased step length   Distance 65'   Comments O2 dropped to 83 but recovered to 90s with rest.   Ambulation 2   Surface - 2 level tile   Device 2 Rolling Walker   Other Apparatus 2 O2;Wheelchair follow  (3)   Assistance 2 Contact guard assistance   Quality of Gait 2 reciprocal, fatigues quickly. Breaks taken throughout ambulation. Gait Deviations Slow Nae;Decreased step length   Distance 65'   Comments O2 dropped to 84 but recovered to 90s with rest.   Exercises   Comments LAQ with manual resistance 1x10; DF with manual resistance 1x12; hip flexion/extension with manual resistance for hip flexion only on RLE. Conditions Requiring Skilled Therapeutic Intervention   Assessment Pt. SBA with bed mobility, min A to CGA with transfers, and CGA with ambulation. Safety Devices   Type of devices All fall risk precautions in place;Call light within reach; Chair alarm in place; Patient at risk for falls; Left in chair

## 2021-03-25 NOTE — PLAN OF CARE
Problem: Falls - Risk of:  Goal: Will remain free from falls  Description: Will remain free from falls  3/25/2021 0959 by Nataly White, RN  Outcome: Ongoing  3/24/2021 2313 by Ashley Fischer RN  Outcome: Ongoing   Up with 1-2 assist depending on fatigue level

## 2021-03-25 NOTE — PROGRESS NOTES
Hospitalist Progress Note  3/25/2021 2:24 PM  Subjective:   Admit Date: 3/15/2021  PCP: Cory Oscar MD    Chief Complaint: dyspnea    Subjective: Patient is feeling better than he was at his worst a few days ago, but is still more dyspneic than prior to discharge to inpatient rehab. He has a lot of questions about the management and prognosis of CHF, which were answered. Per nursing and the patient, he is diuresing better than was indicated by I&O data on the chart earlier, though this seems to have been since updated. He has a scale at home and weighs himself every morning, but was not yet on diuretic with adjustment plan. History is otherwise unchanged. ROS: 14 point review of systems is negative except as specifically addressed above.     DIET CARDIAC; Daily Fluid Restriction: 1500 ml    Intake/Output Summary (Last 24 hours) at 3/25/2021 1424  Last data filed at 3/25/2021 1252  Gross per 24 hour   Intake 720 ml   Output 1575 ml   Net -855 ml     Medications:   sodium chloride      dextrose       Current Facility-Administered Medications   Medication Dose Route Frequency Provider Last Rate Last Admin    furosemide (LASIX) tablet 40 mg  40 mg Oral BID Jet Holt MD   40 mg at 03/25/21 0738    budesonide (PULMICORT) nebulizer suspension 500 mcg  0.5 mg Nebulization BID Isiah Sinha MD   500 mcg at 03/25/21 1658    Arformoterol Tartrate (BROVANA) nebulizer solution 15 mcg  15 mcg Nebulization BID Isiah Sinha MD   15 mcg at 03/25/21 6433    0.9 % sodium chloride infusion   Intravenous PRN Demi Jett MD        linaclotide [facility]) capsule 145 mcg  145 mcg Oral QAM AC Talib Barbosa MD   145 mcg at 03/25/21 0539    sodium chloride flush 0.9 % injection 10 mL  10 mL Intravenous BID Talib Barbosa MD   10 mL at 03/25/21 0742    ondansetron (ZOFRAN) injection 4 mg  4 mg Intravenous Q6H PRN Sharie Curling, MD        sodium chloride flush 0.9 % injection 10 mL  10 mL Intravenous PRN Ana Mclain MD   10 mL at 03/22/21 1650    acetaminophen (TYLENOL) suppository 650 mg  650 mg Rectal Q6H PRN Ana Mclain MD        dextrose 5 % solution  100 mL/hr Intravenous PRN Ana Mclain MD        dextrose 50 % IV solution  12.5 g Intravenous PRN Ana Mclain MD        glucagon (rDNA) injection 1 mg  1 mg Intramuscular PRN Ana Mclain MD        glucose (GLUTOSE) 40 % oral gel 15 g  15 g Oral PRN Ana Mclain MD        amiodarone (CORDARONE) tablet 200 mg  200 mg Oral BID Ana Mclain MD   200 mg at 03/25/21 5553    aspirin EC tablet 81 mg  81 mg Oral Daily Ana Mclain MD   81 mg at 03/25/21 3522    atorvastatin (LIPITOR) tablet 20 mg  20 mg Oral Daily Ana Mclain MD   20 mg at 03/25/21 2140    bisacodyl (DULCOLAX) EC tablet 5 mg  5 mg Oral Daily PRN Ana Mclain MD        calcium carbonate (TUMS) chewable tablet 500 mg  500 mg Oral TID PRN Ana Mclain MD        ferrous sulfate (IRON 325) tablet 325 mg  325 mg Oral BID  Ana Mclain MD   325 mg at 45/80/16 9282    folic acid (FOLVITE) tablet 1 mg  1 mg Oral Daily Ana Mclain MD   1 mg at 03/25/21 0738    guaiFENesin (ROBITUSSIN) 100 MG/5ML liquid 200 mg  200 mg Oral Q4H PRN Ana Mclain MD        insulin lispro (HUMALOG) injection vial 0-6 Units  0-6 Units Subcutaneous TID  Ana Mclain MD   2 Units at 03/25/21 1202    insulin lispro (HUMALOG) injection vial 0-3 Units  0-3 Units Subcutaneous Nightly Ana Mclain MD   1 Units at 03/24/21 2045    ipratropium-albuterol (DUONEB) nebulizer solution 1 ampule  1 ampule Inhalation Q4H PRN Ana Mclain MD   1 ampule at 03/22/21 1005    metoprolol succinate (TOPROL XL) extended release tablet 100 mg  100 mg Oral Daily Ana Mclain MD   100 mg at 03/25/21 0737    miconazole (Breann Erickson) exam  HEENT: atraumatic, eyes with clear conjunctiva and normal lids, pupils and irises normal, external ears and nose are normal, lips normal  Neck without masses, lympadenopathy, bruit, thyroid normal  Lungs: no increased work of breathing, diminished breath sounds bibasilar  Heart: regular rate and rhythm, S1, S2 normal, no murmur, click, rub or gallop  Abdomen: soft, non-tender; bowel sounds normal; no masses,  no organomegaly and obese  Extremities: edema trace to 1+ bilaterally, modified Micky's negative  Neurologic: no focal neurologic deficits, normal sensation, alert and oriented, affect and mood appropriate  Skin: no rashes, nodules    Assessment and Plan:   Principal Problem:    Acute on chronic respiratory failure (HCC)  Active Problems:    Bilateral carotid artery stenosis    Atherosclerosis of native artery of both lower extremities with intermittent claudication (HCC)    Primary osteoarthritis of left knee    Arthritis of knee    Essential hypertension    Pure hypercholesterolemia    Iron deficiency anemia    GERD (gastroesophageal reflux disease)    CHF (congestive heart failure) (HCC)    Thrombocytopenia (HCC)    History of bladder cancer    Acute respiratory failure (HCC)    Chronic midline low back pain without sciatica    Chronic kidney disease    Atrial fibrillation (HCC)    Vitamin D deficiency    Anemia    Alcohol use    Pacemaker    Class 1 obesity in adult  Resolved Problems:    * No resolved hospital problems. *      Continue diuresis with furosemide 40 mg BID. Seems to be diuresing acceptably, though I&O data has been inconsistent. Discussed the importance of daily weights and the need to call PCP for dosage adjustments with weight gain of greater than 2 pounds.     Advance Directive: Full Code    DVT prophylaxis: SCDs    Discharge planning: to home soon      Clide Host, DO [facility]

## 2021-03-25 NOTE — PROGRESS NOTES
03/25/21 6905   Restrictions/Precautions   Restrictions/Precautions Surgical Protocols; Fall Risk   Implants present? Pacemaker   Pain Screening   Patient Currently in Pain No   Pain Assessment   Pain Assessment 0-10   Pain Level 0   Oxygen Therapy   SpO2 92 %   Pulse Oximeter Device Mode Intermittent   Pulse Oximeter Device Location Left;Finger   O2 Device Nasal cannula   O2 Flow Rate (L/min) 3 L/min   Bed Mobility   Supine to Sit Stand by assistance   Sit to Supine Stand by assistance   Scooting Independent   Comment from right side of bed. Triplanar method utilized. Transfers   Sit to Stand Minimal Assistance   Stand to sit Contact guard assistance   Comment BUE on w/c going from sit to stand. Ambulation   Ambulation? Yes   Ambulation 1   Surface level tile   Device Rolling Walker   Other Apparatus O2;Wheelchair follow  (3 L O2)   Assistance Contact guard assistance   Quality of Gait reciprocal, fatigues quickly. Breaks taken throughout ambulation. Gait Deviations Slow Nae;Decreased step length   Distance 100'   Comments O2 dropped to 81 but recovered to 90s with rest.   Stairs/Curb   Stairs? Yes   Stairs   # Steps  4   Stairs Height 4\"   Rails Bilateral   Device No Device   Assistance Modified independent    Comment Fatigue set in quickly. O2 dropped to low 80s but recovered to 90s with rest.    Conditions Requiring Skilled Therapeutic Intervention   Assessment Pt. SBA with bed mobility, min A to CGA with transfers, and CGA with ambulation. Activity Tolerance   Activity Tolerance Patient Tolerated treatment well;Patient limited by fatigue   Safety Devices   Type of devices All fall risk precautions in place; Bed alarm in place;Call light within reach; Patient at risk for falls; Left in bed

## 2021-03-25 NOTE — PROGRESS NOTES
Patient:   Abbie Land  MR#:    [id number]   Room:    8458/450-31   YOB: 1940  Date of Progress Note: 3/25/2021  Time of Note                           7:57 AM  Consulting Physician:   Marichuy Gonzalez M.D. Attending Physician:  Marichuy Gonzalez MD     Chief complaint Respiratory failure     S:This 79 y.o. male  with a past medical history of bladder cancer, HTN, CKD stage III, PVD, hyperlipidemia,CAD,COPD,CHF has oxygen at home but doesn't wear if often,ETOH use drinks 2 glasses of wine every night and former smoker. He presented to [facility] ER on 3/6/21 with shortness of breath. CXR done showed increased pulmonary vascular congestion and CT of chest showed bilateral pleural effusion. He was admitted to the hospitalist service with Acute on chronic hypoxemic and hypercapnic respiratory, COPD exacerbation and possible recurrent pneumonia. Pulmonology was consulted. He was seen by Dr. Stephen Victor, who didn't feel he had pneumoinia st this time, but did recommend Trilogy at discharge d/t patient not tolerating Bi-PAP for questionable sleep apnea. Patient had an episode of A-Fib with RVR. Cardiology was consulted. He was seen by Dr. Kingston Flynn. He underwent cardiac catheterization by Dr. Chasity Noriega on 3/10/21 revealing 100% occlusion right coronary artery which appears chronic in nature well collateralized from the left no significant disease on the left. Dr. Chasity Noriega recommended placement of a dual-chamber pacemaker. Patient was in agreement and went or surgery on 3/13/21. He tolerated the procedure well. SPT was consulted to evaluate swallow. He was noted to have oropharyngeal phase dysphagia but no s/s of aspiration, he was placed on a regular diet with thin liquids. Remains fixated on wanting to know one single reason as the cause for his recurrent pulmonary difficulties.     REVIEW OF SYSTEMS:  Constitutional: No fevers No chills  Neck:No stiffness  Respiratory: some shortness of breath  Cardiovascular: No chest pain No palpitations  Gastrointestinal: No abdominal pain    Genitourinary: No Dysuria  Neurological: No headache, no confusion    Past Medical History:      Diagnosis Date    Acute liver failure without hepatic coma 10/23/2018    Back pain     \"with tired legs as a result\"    Bladder cancer [facility] [address] 12/19/2018    Blood circulation, collateral     Carotid arterial disease ([facility] [address]     recent surgery    CKD (chronic kidney disease), 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/2018    COPD with acute lower respiratory infection ([address] 2/24/2021    GERD (gastroesophageal reflux disease)     Hyperlipidemia     Hypertension     Hypertension     Palliative care patient 10/23/2018    Pneumonia due to infectious organism 11/06/2018    Primary osteoarthritis of left knee 10/14/2018    PVD (peripheral vascular disease) (HCC)     Tremor     Tremor on Right side x 1-2 weeks per stepdaughter    Type 2 diabetes mellitus with complication, without long-term current use of insulin ([facility] [address] 1/21/2021       Past Surgical History:      Procedure Laterality Date    BACK SURGERY      CARDIAC CATHETERIZATION  03/23/2021    100% RCA    COLONOSCOPY  2007?     CYSTOSCOPY Bilateral 12/19/2018    CYSTOSCOPY, BIOSPY FULGURATION OF BLADDER TUMOR POSSIBLE TURBT, RETROGRADE PYELOGRAM performed by Janeth Mazariegos MD at [facility] COLONOSCOPY FLX DX W/COLLJ SPEC WHEN PFRMD N/A 09/11/2017    Dr Michelle Curiel internal hemorrhoids, diverticular disease-HP-No recall (age)   Kaylene Hines PA REVISE MEDIAN N/CARPAL TUNNEL SURG Left 07/18/2018    OPEN CARPAL TUNNEL RELEASE performed by Red Lopez MD at [address] [place] Left 08/28/2018    LEFT CAROTID ENDARTERECTOMY WITH VEIN PATCH ANGIOPLASTY AND COMPLETION ANGIOGRAM performed by Nita Del Toro MD at [address] [place] [address],  [address] Left 10/15/2018    LEFT COMPLEX TOTAL KNEE ARTHROPLASTY performed by Red Lopez MD at [facility] ADENOIDECTOMY      VASCULAR SURGERY  04/21/2015    Alan BESS Ultrasound guided access of left common femoral artery. Aortogram.Diagnostic right lower extremity arteriogram.Radiologic supervision and interpretation.  VASCULAR SURGERY  01/13/2015    Alan Cuellar M.D Atherectomy,angioplasty,and stenting of left superficial femoral artery.  VASCULAR SURGERY  03/11/2014    Alan Cuellar M.D. Ultrasound-guided access of right common femoral artery. Aortogram.Left lower extremity arteriogram.Atherectomy and angioplasty of left superficial femoral artery. Radiologic supervision and interpretation.  VASCULAR SURGERY  01/18/2013    Alan BESS Aortogram.Multistation arteriogram right lower extremity. Laser atherectomy and angioplasty of right superficial femoral artery. Selective catheterization of right tibioperoneal trunk. Angioplasty of peroneal artery and tibioperoneal trunk.  VASCULAR SURGERY  10/30/2018    [facility]. Ultrasound guided cannulation of right internal vein. Placement of right internal jugular vein tunneled dialysis catheter bard equistream xk 23cm tip to cuff    VASCULAR SURGERY  12/17/2018    [facility]. Removal of tunneled dilaysis catheter right internal jugular vein.        Medications in Hospital:      Current Facility-Administered Medications:     furosemide (LASIX) tablet 40 mg, 40 mg, Oral, BID, Jet Holt MD, 40 mg at 03/25/21 0738    budesonide (PULMICORT) nebulizer suspension 500 mcg, 0.5 mg, Nebulization, BID, Kate Patten MD, 500 mcg at 03/25/21 4525    Arformoterol Tartrate (BROVANA) nebulizer solution 15 mcg, 15 mcg, Nebulization, BID, Kate Patten MD, 15 mcg at 03/25/21 0624    0.9 % sodium chloride infusion, , Intravenous, PRN, Ashley Molina MD    linaclotide [facility]) capsule 145 mcg, 145 mcg, Oral, QAM AC, Shyann Garza MD, 145 mcg at 03/25/21 0539    sodium chloride flush 0.9 % injection 10 mL, 10 mL, Intravenous, BID, Shyann Garza MD, 10 mL at 03/25/21 0742    ondansetron (ZOFRAN) injection 4 mg, 4 mg, Intravenous, Q6H PRN, Johnny Ni MD    sodium chloride flush 0.9 % injection 10 mL, 10 mL, Intravenous, PRN, Johnny Ni MD, 10 mL at 03/22/21 1650    [DISCONTINUED] acetaminophen (TYLENOL) tablet 650 mg, 650 mg, Oral, Q6H PRN **OR** acetaminophen (TYLENOL) suppository 650 mg, 650 mg, Rectal, Q6H PRN, Johnny Ni MD    dextrose 5 % solution, 100 mL/hr, Intravenous, PRN, Johnny Ni MD    dextrose 50 % IV solution, 12.5 g, Intravenous, PRN, Johnny Ni MD    glucagon (rDNA) injection 1 mg, 1 mg, Intramuscular, PRN, Johnny Ni MD    glucose (GLUTOSE) 40 % oral gel 15 g, 15 g, Oral, PRN, Johnny Ni MD    amiodarone (CORDARONE) tablet 200 mg, 200 mg, Oral, BID, Johnny Ni MD, 200 mg at 03/25/21 8769    aspirin EC tablet 81 mg, 81 mg, Oral, Daily, Johnny Ni MD, 81 mg at 03/25/21 8655    atorvastatin (LIPITOR) tablet 20 mg, 20 mg, Oral, Daily, Johnny Ni MD, 20 mg at 03/25/21 3189    bisacodyl (DULCOLAX) EC tablet 5 mg, 5 mg, Oral, Daily PRN, Johnny Ni MD    calcium carbonate (TUMS) chewable tablet 500 mg, 500 mg, Oral, TID PRN, Johnny Ni MD    ferrous sulfate (IRON 325) tablet 325 mg, 325 mg, Oral, BID Johnny RODRIGUEZ MD, 325 mg at 35/83/31 8553    folic acid (FOLVITE) tablet 1 mg, 1 mg, Oral, Daily, Johnny Ni MD, 1 mg at 03/25/21 0738    guaiFENesin (ROBITUSSIN) 100 MG/5ML liquid 200 mg, 200 mg, Oral, Q4H PRN, Johnny Ni MD    insulin lispro (HUMALOG) injection vial 0-6 Units, 0-6 Units, Subcutaneous, TID Johnny RODRIGUEZ MD, 1 Units at 03/25/21 0738    insulin lispro (HUMALOG) injection vial 0-3 Units, 0-3 Units, Subcutaneous, Nightly, Johnny Ni MD, 1 Units at 03/24/21 2045    ipratropium-albuterol (DUONEB) nebulizer solution 1 ampule, 1 ampule, Inhalation, Q4H PRN, Olga Monsivais MD, 1 ampule at 03/22/21 1005    metoprolol succinate (TOPROL XL) extended release tablet 100 mg, 100 mg, Oral, Daily, Olga Monsivais MD, 100 mg at 03/25/21 0737    miconazole (MICOTIN) 2 % powder, , Topical, 4x Daily, Olga Monsivais MD, Given at 03/25/21 0746    NIFEdipine (PROCARDIA XL) extended release tablet 60 mg, 60 mg, Oral, Daily, Olga Monsivais MD, 60 mg at 03/25/21 0737    pantoprazole (PROTONIX) tablet 40 mg, 40 mg, Oral, Daily, Olga Monsivais MD, 40 mg at 03/25/21 0738    tamsulosin (FLOMAX) capsule 0.4 mg, 0.4 mg, Oral, BID, Olga Monsivais MD, 0.4 mg at 03/25/21 8653    vitamin D (ERGOCALCIFEROL) capsule 50,000 Units, 50,000 Units, Oral, Weekly, Olga Monsivais MD, 50,000 Units at 03/22/21 0818    acetaminophen (TYLENOL) tablet 650 mg, 650 mg, Oral, Q4H PRN, Marga Dotson MD    polyethylene glycol (GLYCOLAX) packet 17 g, 17 g, Oral, Daily PRN, Marga Dotson MD    Allergies:  Eliquis [apixaban] and Promethazine hcl    Social History:   TOBACCO:   reports that he quit smoking about 17 years ago. He has never used smokeless tobacco.  ETOH:   reports current alcohol use of about 12.0 standard drinks of alcohol per week. Family History:       Problem Relation Age of Onset    Colon Cancer Father     Diabetes Brother     Colon Polyps Neg Hx     Liver Cancer Neg Hx     Liver Disease Neg Hx     Esophageal Cancer Neg Hx     Rectal Cancer Neg Hx     Stomach Cancer Neg Hx          PHYSICAL EXAM:  /80   Pulse 69   Temp 97.1 °F (36.2 °C) (Temporal)   Resp 20   Ht 6' (1.829 m)   Wt 250 lb (113.4 kg)   SpO2 96%   BMI 33.91 kg/m²     Constitutional - well developed, well nourished.    Eyes - conjunctiva normal.   Ear, nose, throat - No scars, masses, or lesions over external nose or ears, no atrophy of tongue  Neck-symmetric, no masses noted, no jugular vein distension  Respiration- chest wall appears symmetric, good expansion,   normal effort without use of accessory muscles  Musculoskeletal - no significant wasting of muscles noted, no bony deformities  Extremities-no clubbing, cyanosis or edema  Skin - warm, dry, and intact. No rash, erythema, or pallor. Psychiatric - mood, affect, and behavior appear normal.      Neurological exam  Awake, alert, fluent oriented appropriate affect  Attention and concentration appear appropriate  Recent and remote memory appears unremarkable  Speech normal without dysarthria  No clear issues with language of fund of knowledge     Cranial Nerve Exam     CN III, IV,VI-EOMI, No nystagmus, conjugate eye movements, no ptosis    CN VII-no facial assymetry       Motor Exam  antigravity throughout upper and lower extremities bilaterally      Tremors- no tremors in hands or head noted     Gait  Not tested        Nursing/pcp notes, imaging,labs and vitals reviewed. PT,OT and/or speech notes reviewed    Lab Results   Component Value Date    WBC 5.2 03/25/2021    HGB 8.4 (L) 03/25/2021    HCT 28.1 (L) 03/25/2021    MCV 98.6 (H) 03/25/2021     (L) 03/25/2021     Lab Results   Component Value Date     03/25/2021    K 4.0 03/25/2021     03/25/2021    CO2 32 (H) 03/25/2021    BUN 44 (H) 03/25/2021    CREATININE 2.0 (H) 03/25/2021    GLUCOSE 159 (H) 03/25/2021    CALCIUM 8.4 (L) 03/25/2021    PROT 4.9 (L) 03/25/2021    LABALBU 3.0 (L) 03/25/2021    BILITOT 0.8 03/25/2021    ALKPHOS 112 03/25/2021    AST 10 03/25/2021    ALT 13 03/25/2021    LABGLOM 32 (A) 03/25/2021    GFRAA 39 (L) 03/25/2021     Lab Results   Component Value Date    INR 1.18 03/23/2021    INR 1.11 03/15/2021    INR 1.13 03/06/2021    PROTIME 14.9 (H) 03/23/2021    PROTIME 14.3 03/15/2021    PROTIME 14.4 03/06/2021 03/24/21 1430   Restrictions/Precautions   Restrictions/Precautions Surgical Protocols; Fall Risk   Pain Assessment   Pain Assessment 0-10 Pain Level 0   Oxygen Therapy   SpO2 90 %   O2 Device Nasal cannula   O2 Flow Rate (L/min) 3 L/min   Bed Mobility   Rolling Stand by assistance   Supine to Sit Stand by assistance   Scooting Independent   Transfers   Sit to Stand Minimal Assistance; Moderate Assistance   Stand to sit Contact guard assistance   Ambulation   Ambulation? Yes   Ambulation 1   Surface level tile   Device Rolling Walker   Other Apparatus O2;Wheelchair follow  (4 L)   Assistance Contact guard assistance   Quality of Gait reciprocal, fatigues quickly. Breaks taken throughout ambulation. Gait Deviations Slow Nae;Decreased step length   Distance 75'   Comments O2 dropped to 83 and carmina back to 90s with rest.   Ambulation 2   Surface - 2 level tile   Device 2 Rolling Walker   Other Apparatus 2 Wheelchair follow;O2  (4 L)   Assistance 2 Contact guard assistance   Quality of Gait 2 reciprocal, fatigues quickly. Breaks taken throughout ambulation. Gait Deviations Slow Nae;Decreased step length   Distance 30'   Comments O2 dropped to 90. Muscles fatigued. Exercises   Comments Hip flexion with light manual resistance 2x12; hip ABD with manual resistance 2x12; LAQ with manual resistance 1x12. Conditions Requiring Skilled Therapeutic Intervention   Assessment Patient required mod to min A sit to stand transfers and CGA with ambulation. Pt.'s O2 dropped to 83 but recovered to the 90s with rest.    Activity Tolerance   Activity Tolerance Patient Tolerated treatment well;Patient limited by fatigue   Safety Devices   Type of devices All fall risk precautions in place; Bed alarm in place;Call light within reach; Patient at risk for falls; Left in bed                    Objective    Balance  Standing Balance: Contact guard assistance  Functional Mobility  Functional - Mobility Device: Rolling Walker  Activity: Other;Retrieve items;Transport items  Functional Mobility Comments: Short distance x3 occasions  Transfers  Sit to stand: Contact guard assistance  Stand to sit: Contact guard assistance  Type of ROM/Therapeutic Exercise  Type of ROM/Therapeutic Exercise: Cane/Wand  Comment: 1#; 3 sets of 10 in all planes within pacemaker precautions    RECORD REVIEW: Previous medical records, medications were reviewed at today's visit    IMPRESSION:   1. Respiratory failure. CHF/COPD-O2/Duonebs PRN/tapering prednisone/Trilogy  2. Atrial fibrillation/S/P PPM-Amiodarone/Procardia XL/metorprolol  3. CAD/PVD-ASA/statin  4. Hyperlipidemia-on statin  5. Anemia-on iron. Status post 1 unit packed red blood cells with 3/12 and 3/20. Followed by hematology. 6. HTN-on meds monitor  7. GI-bowel regimen/PPI  8. BPH/history of bladder cancer-on meds  9. Vitamin D deficiency-on Vitamin D  10. DVT prophylaxis-SCDs  11. ETOH use-folic acid  12. Chronic kidney disease-monitor. Stable currently  13. Thrombocytopenia-monitor  14. Back pain/arthritis-Tylenol  15. PT/OT/Speech  16. Shortness of air-repeat chest x-ray and consult pulmonology. Thrombocytopenia [id number] following  17. Pulmonary edema. Better  appeciate hospitalist/pulmonology/h/o assistance. Watch renal fct. Now on Lasix.       Expected duration and frequency therapy: 180 minutes per day, 5 days per week      ELOS:3/29, Monday

## 2021-03-25 NOTE — PLAN OF CARE
Problem: Falls - Risk of:  Goal: Will remain free from falls  Description: Will remain free from falls  3/24/2021 2319 by Richard Blum RN  Outcome: Ongoing  3/24/2021 1113 by Minh Hernandez RN  Outcome: Ongoing  Goal: Absence of physical injury  Description: Absence of physical injury  3/24/2021 2319 by Richard Blum RN  Outcome: Ongoing  3/24/2021 1113 by Minh Hernandez RN  Outcome: Ongoing     Problem: IP BLADDER/VOIDING  Goal: LTG - patient will complete bladder elimination  3/24/2021 2319 by Richard Blum RN  Outcome: Ongoing  3/24/2021 1113 by Minh Hernandez RN  Outcome: Ongoing  Goal: LTG - Patient will utilize adaptive techniques/equipment to complete bladder elimination  3/24/2021 2319 by Richard Blum RN  Outcome: Ongoing  3/24/2021 1113 by Minh Hernandez RN  Outcome: Ongoing  Goal: LTG - patient will achieve acceptable level of continence  3/24/2021 2319 by Richard Blum RN  Outcome: Ongoing  3/24/2021 1113 by Minh Hernandez RN  Outcome: Ongoing  Goal: STG - Patient demonstrates ability to take care of indwelling catheter  3/24/2021 2319 by Richard Blum RN  Outcome: Ongoing  3/24/2021 1113 by Minh Hernandez RN  Outcome: Ongoing  Goal: STG - patient demonstrates self-cath technique using clean technique and care of the catheter  3/24/2021 2319 by Richard Blum RN  Outcome: Ongoing  3/24/2021 1113 by Minh Hernandez RN  Outcome: Ongoing  Goal: STG - Patient demonstrates no accidents  3/24/2021 2319 by Richard Blum RN  Outcome: Ongoing  3/24/2021 1113 by Minh Hernandez RN  Outcome: Ongoing  Goal: STG - Patient will state signs and symptoms of UTI  3/24/2021 2319 by Richard Blum RN  Outcome: Ongoing  3/24/2021 1113 by Minh Hernandez RN  Outcome: Ongoing  Goal: STG - patient will be able to empty bladder  3/24/2021 2319 by Richard Blum RN  Outcome: Ongoing  3/24/2021 1113 by Minh Hernandez, RN  Outcome: Ongoing  Goal: STG - Patient demonstrates understanding of self catheterization schedule by completing 1113 by Trevor Lindsey RN  Outcome: Ongoing  Goal: STG - patient will be continent of stool  3/24/2021 2319 by Odessa Jeans, RN  Outcome: Ongoing  3/24/2021 1113 by Trevor Lindsey RN  Outcome: Ongoing  Goal: STG - Patient completes digital stimulation technique  3/24/2021 2319 by Odessa Jeans, RN  Outcome: Ongoing  3/24/2021 1113 by Trevor Lindsey RN  Outcome: Ongoing  Goal: STG - Patient verbalizes knowledge about relationship between diet, fluid intake, activity and medication on constipation  3/24/2021 2319 by Odessa Jeans, RN  Outcome: Ongoing  3/24/2021 1113 by Trevor Lindsey RN  Outcome: Ongoing     Problem: IP BREATHING  Goal: LTG - patient will mobilize secretions and maintain airway  3/24/2021 2319 by Odessa Jeans, RN  Outcome: Ongoing  3/24/2021 1113 by Trevor Lindsey RN  Outcome: Ongoing  Goal: LTG - Patient/caregiver will demonstrate/perform proper techniques to maintain patent airway  3/24/2021 2319 by Odessa Jeans, RN  Outcome: Ongoing  3/24/2021 1113 by Trevor Lindsey RN  Outcome: Ongoing  Goal: LTG - patient/caregiver will demonstrate/perform improved or stable self care abilities within physical limitations  3/24/2021 2319 by Odessa Jeans, RN  Outcome: Ongoing  3/24/2021 1113 by Trevor Lindsey RN  Outcome: Ongoing  Goal: STG - Patient/caregiver will maintain patent airway  3/24/2021 2319 by Odessa Jeans, RN  Outcome: Ongoing  3/24/2021 1113 by Trevor Lindsey RN  Outcome: Ongoing  Goal: STG - respiratory rate and effort will be within normal limits for the patient  3/24/2021 2319 by Odessa Jeans, RN  Outcome: Ongoing  3/24/2021 1113 by Trevor Lindsey RN  Outcome: Ongoing  Goal: STG - patient/caregiver will be able to verbalize oxygen safety precautions  3/24/2021 2319 by Odessa Jeans, RN  Outcome: Ongoing  3/24/2021 1113 by Trevor Lindsey RN  Outcome: Ongoing  Goal: STG - Patient/caregiver demonstrates correct suctioning technique  3/24/2021 2319 by Odessa Jeans, RN  Outcome: Ongoing  3/24/2021 1113 by Bette Akhtar RN  Outcome: Ongoing  Goal: STG - patient will utilize incentive spirometer  3/24/2021 2319 by Wanda Carlson RN  Outcome: Ongoing  3/24/2021 1113 by Bette Akhtar RN  Outcome: Ongoing  Goal: STG - Patient performs or directs assisted coughing  3/24/2021 2319 by Wanda Carlson RN  Outcome: Ongoing  3/24/2021 1113 by Bette Akhtar RN  Outcome: Ongoing  Goal: STG - patient can administer MDI's  3/24/2021 2319 by Wanda Carlson RN  Outcome: Ongoing  3/24/2021 1113 by Bette Akhtar RN  Outcome: Ongoing  Goal: STG - Patient can utilize incentive spirometer  3/24/2021 2319 by Wanda Carlson RN  Outcome: Ongoing  3/24/2021 1113 by Bette Akhtar RN  Outcome: Ongoing  Goal: STG - family can complete suctioning  3/24/2021 2319 by Wanda Carlson RN  Outcome: Ongoing  3/24/2021 1113 by Bette Akhtar RN  Outcome: Ongoing     Problem: SAFETY  Goal: LTG - patient will adhere to hip precautions during ADL's and transfers  3/24/2021 2319 by Wanda Carlson RN  Outcome: Ongoing  3/24/2021 1113 by Bette Akhtar RN  Outcome: Ongoing  Goal: LTG - Patient will demonstrate safety requirements appropriate to situation/environment  3/24/2021 2319 by Wanda Carlson RN  Outcome: Ongoing  3/24/2021 1113 by Bette Akhtar RN  Outcome: Ongoing  Goal: LTG - patient will utilize safety techniques  3/24/2021 2319 by Wanda Carlson RN  Outcome: Ongoing  3/24/2021 1113 by Bette Akhtar RN  Outcome: Ongoing  Goal: STG - patient locks brakes on wheelchair  3/24/2021 2319 by Wanda Carlson RN  Outcome: Ongoing  3/24/2021 1113 by Bette Akhtar RN  Outcome: Ongoing  Goal: STG - Patient uses call light consistently to request assistance with transfers  3/24/2021 2319 by Wanda Carlson RN  Outcome: Ongoing  3/24/2021 1113 by Bette Akhtar RN  Outcome: Ongoing  Goal: STG - patient uses gait belt during all transfers  3/24/2021 2319 by Wanda Carlson RN  Outcome: Ongoing  3/24/2021 1113 by Bette Akhtar RN  Outcome: Ongoing

## 2021-03-25 NOTE — PROGRESS NOTES
Occupational Therapy  Facility/Department: [facility] 8 REHAB UNIT  Daily Treatment Note  NAME: Benson Amaro  : 1940  MRN: [id number]    Date of Service: 3/25/2021    Discharge Recommendations:  [address], 24 hour supervision or assist(Recommend SNF for further rehab, but pt refuses to go to SNF. Discharging home with assistance with [id number] and his wife.)  OT Equipment Recommendations  Equipment Needed: Yes  Mobility Devices: Nomi; Wheelchair;ADL Assistive Devices  Walker: [employer]  ADL Assistive Devices: Toileting - 3-in-1 Commode  Other: 20\" wheelchair    Assessment   Performance deficits / Impairments: Decreased functional mobility ; Decreased endurance;Decreased strength;Decreased ROM; Decreased balance;Decreased ADL status; Decreased high-level IADLs  Assessment: Sent RW, w/c and BSC order to [address] [place] [address]. They plan on delivering items tomorow to the patient's home. Spoke to wife about equipment needed, that patient required 3-4 L O2 and that O2 sats needed to be above 90%. Recommend SNF for further rehab, but pt refuses to go to SNF. Pt is discharging with  care at home with Home Health OT. Treatment Diagnosis: Pneumonia, recent pacemaker (3-13-21)  OT Education: Transfer Training;Family Education; Energy Conservation;Equipment  Patient Education: Spoke to wife about equipment needed, that patient required 3-4 L O2 and that O2 sats needed to be above 90%. Activity Tolerance  Activity Tolerance: Patient Tolerated treatment well  Safety Devices  Safety Devices in place: Yes(Left with PT)  Type of devices: Left in chair         Patient Diagnosis(es): There were no encounter diagnoses.       has a past medical history of Acute liver failure without hepatic coma, Back pain, Bladder cancer (HCC), Blood circulation, collateral, Carotid arterial disease ([address] CKD (chronic kidney disease), stage II, COPD with acute lower respiratory infection ([facility] [address] GERD (gastroesophageal reflux term goals: 1 week  Short term goal 1: pt will complete LB dressing with AE prn with CGA  Short term goal 2: pt will complete overall toileting with CGA  Short term goal 3: pt will complete simple ambulatory home making task with CGA while maintaining pacemaker precautions  Short term goal 4: pt will complete overall bathing with CGA  Short term goal 5: pt will complete 1-2 handed static standing act for 2 mins with supervision  Short term goal 6: pt will complete HEP x 5 occasions within range of pacemaker precautions to improve strength and endurance for ADLs  Long term goals  Time Frame for Long term goals : 2 weeks  Long term goal 1: pt will complete overall dressing with modified independence  Long term goal 2: pt will complete overall toileting with modified independence  Long term goal 3: pt will complete overall bathing with modified independence  Long term goal 4: pt will complete simple ambulatory home making task with modified independence  Long term goal 5: pt will complete HEP with independence  Long term goals 6: pt verbalize DME for home       Therapy Time   Individual Concurrent Group Co-treatment   Time In 1300         Time Out 1345         Minutes 45         Timed Code Treatment Minutes: [address] [facility], OT

## 2021-03-25 NOTE — PROGRESS NOTES
Durable Medical Equipment   Physician Order     Patient Name Zohaib Jimenez  Patient Phone: [phone] [facility])   [phone] (Mobile) *Preferred*    Patient Address: [address] [place] [address] [address] [address]    Patient Height Height: 6' (182.9 cm)  Patient Weight 250 lb (113.4 kg)   1940     DME NEEDED:   ·  ROLLING WALKER  · 20\" WIDE WHEELCHAIR, SWING AWAY FOOT RESTS, FULL LENGTH ARM RESTS, ANTI-TIPPERS  · 20\" WIDE WHEELCHAIR CUSHION  · BEDSIDE COMMODE    1. MEDICARE/RAILROAD MEDICARE    F/O Payor/Plan Precert #   MEDICARE/RAILROAD MEDICARE    Subscriber Subscriber #   Daja Schulte [id number]   Address Phone   [address]   [facility], [address] [place] [address] Rd    2.  RETIREE MEDICAL INS/MONUMENTAL LIFE    F/O Payor/Plan Precert #   RETIREE MEDICAL INS/MONUMENTAL LIFE    Subscriber Subscriber #   Daja Schulte [id number]   Address Phone   [address] [facility], [address] [place] [address] [phone]       DIAGNOSIS:  Hospital Problem List  Date Reviewed: 2021     ICD-10-CM Priority Class Noted POA    Acute on chronic respiratory failure ([address] J96.20   2021 Yes   Bilateral carotid artery stenosis I65.23   2018 Yes   Atherosclerosis of native artery of both lower extremities with intermittent claudication ([address] I70.213   2018 Yes   Primary osteoarthritis of left knee M17.12   10/14/2018 Yes   Arthritis of knee M17.10   10/15/2018 Yes   Essential hypertension I10   10/15/2018 Yes   Pure hypercholesterolemia E78.00   10/15/2018 Yes   Iron deficiency anemia D50.9   10/15/2018 Yes   GERD (gastroesophageal reflux disease) K21.9   10/15/2018 Yes   CHF (congestive heart failure) (HCC) I50.9   10/23/2018 Yes   Thrombocytopenia ([address] D69.6   2018 Yes   History of bladder cancer Z85.51   6/15/2020 Yes   Acute respiratory failure ([address] J96.00   2021 Yes   Chronic midline low back pain without sciatica M54.5, G89.29   2021 Yes   Chronic kidney disease N18.9   2021 Yes   Atrial fibrillation ([facility] I48.91   3/16/2021 Yes   Vitamin D deficiency E55.9   3/16/2021 Yes   Anemia D64.9   3/16/2021 Yes   Alcohol use Z72.89   3/16/2021 Yes   Pacemaker Z95.0   3/16/2021 Yes   Class 1 obesity in adult E66.9   3/25/2021 Yes     CHIEF COMPLAINT:  Respiratory failure         HISTORY OF PRESENT ILLNESS:   This 79 y.o. male  with a past medical history of bladder cancer, HTN, CKD stage III, PVD, hyperlipidemia,CAD,COPD,CHF has oxygen at home but doesn't wear if often,ETOH use drinks 2 glasses of wine every night and former smoker. He presented to [place] ER on 3/6/21 with shortness of breath. CXR done showed increased pulmonary vascular congestion and CT of chest showed bilateral pleural effusion. He was admitted to the hospitalist service with Acute on chronic hypoxemic and hypercapnic respiratory, COPD exacerbation and possible recurrent pneumonia. Pulmonology was consulted. He was seen by Dr. Nora Lion, who didn't feel he had pneumoinia st this time, but did recommend Trilogy at discharge d/t patient not tolerating Bi-PAP for questionable sleep apnea. Patient had an episode of A-Fib with RVR. Cardiology was consulted. He was seen by Dr. Parul Rodríguez. He underwent cardiac catheterization by Dr. Kerri Dorantes on 3/10/21 revealing 100% occlusion right coronary artery which appears chronic in nature well collateralized from the left no significant disease on the left. Dr. Kerri Dorantes recommended placement of a dual-chamber pacemaker. Patient was in agreement and went or surgery on 3/13/21. He tolerated the procedure well. SPT was consulted to evaluate swallow. He was noted to have oropharyngeal phase dysphagia but no s/s of aspiration, he was placed on a regular diet with thin liquids. He is participating in both PT/OT. He is felt to need a stay on Rehab to work towards his goal of returning home with his wife.  He is now felt ready to start the Rehab program.        ____________________ _____________________ ________________   Physician Signature      Physician Name (print)   Physician NPI          Date

## 2021-03-26 PROBLEM — R79.89 ELEVATED TROPONIN: Status: RESOLVED | Noted: 2018-10-23 | Resolved: 2021-03-26

## 2021-03-26 PROBLEM — R77.8 ELEVATED TROPONIN: Status: RESOLVED | Noted: 2018-10-23 | Resolved: 2021-03-26

## 2021-03-26 LAB
ALBUMIN SERPL-MCNC: 3.33 G/DL
ALPHA-1-GLOBULIN: 0.44
ALPHA-2-GLOBULIN: 0.59
ANA IGG, ELISA: NOT DETECTED
BETA GLOBULIN: 0.5
ERYTHROPOIETIN: 39
GAMMA GLOBULIN: 0.55
GLUCOSE BLD-MCNC: 144 MG/DL (ref 70–99)
GLUCOSE BLD-MCNC: 173 MG/DL (ref 70–99)
GLUCOSE BLD-MCNC: 205 MG/DL (ref 70–99)
GLUCOSE BLD-MCNC: 271 MG/DL (ref 70–99)
IMMUNOFIXATION REFLEX: NORMAL
PERFORMED ON: ABNORMAL
PLATELET ANTIBODY, IGG: NEGATIVE
PLATELET ANTIBODY, IGM: NEGATIVE
SPE/IFE INTERPRETATION: NORMAL
TOTAL PROTEIN: 5.4

## 2021-03-26 PROCEDURE — 6360000002 HC RX W HCPCS: Performed by: INTERNAL MEDICINE

## 2021-03-26 PROCEDURE — 2700000000 HC OXYGEN THERAPY PER DAY

## 2021-03-26 PROCEDURE — 6370000000 HC RX 637 (ALT 250 FOR IP): Performed by: HOSPITALIST

## 2021-03-26 PROCEDURE — 6370000000 HC RX 637 (ALT 250 FOR IP): Performed by: PSYCHIATRY & NEUROLOGY

## 2021-03-26 PROCEDURE — 97110 THERAPEUTIC EXERCISES: CPT

## 2021-03-26 PROCEDURE — 6370000000 HC RX 637 (ALT 250 FOR IP): Performed by: INTERNAL MEDICINE

## 2021-03-26 PROCEDURE — 97535 SELF CARE MNGMENT TRAINING: CPT

## 2021-03-26 PROCEDURE — 82947 ASSAY GLUCOSE BLOOD QUANT: CPT

## 2021-03-26 PROCEDURE — 97530 THERAPEUTIC ACTIVITIES: CPT

## 2021-03-26 PROCEDURE — 97116 GAIT TRAINING THERAPY: CPT

## 2021-03-26 PROCEDURE — 1180000000 HC REHAB R&B

## 2021-03-26 PROCEDURE — 94640 AIRWAY INHALATION TREATMENT: CPT

## 2021-03-26 RX ADMIN — ATORVASTATIN CALCIUM 20 MG: 20 TABLET, FILM COATED ORAL at 07:47

## 2021-03-26 RX ADMIN — FUROSEMIDE 40 MG: 40 TABLET ORAL at 17:08

## 2021-03-26 RX ADMIN — FOLIC ACID 1 MG: 1 TABLET ORAL at 07:47

## 2021-03-26 RX ADMIN — INSULIN LISPRO 3 UNITS: 100 INJECTION, SOLUTION INTRAVENOUS; SUBCUTANEOUS at 11:34

## 2021-03-26 RX ADMIN — BUDESONIDE 500 MCG: 0.5 SUSPENSION RESPIRATORY (INHALATION) at 06:22

## 2021-03-26 RX ADMIN — FERROUS SULFATE TAB 325 MG (65 MG ELEMENTAL FE) 325 MG: 325 (65 FE) TAB at 17:08

## 2021-03-26 RX ADMIN — INSULIN LISPRO 1 UNITS: 100 INJECTION, SOLUTION INTRAVENOUS; SUBCUTANEOUS at 07:55

## 2021-03-26 RX ADMIN — AMIODARONE HYDROCHLORIDE 200 MG: 200 TABLET ORAL at 22:26

## 2021-03-26 RX ADMIN — TAMSULOSIN HYDROCHLORIDE 0.4 MG: 0.4 CAPSULE ORAL at 07:47

## 2021-03-26 RX ADMIN — METOPROLOL SUCCINATE 100 MG: 50 TABLET, EXTENDED RELEASE ORAL at 07:47

## 2021-03-26 RX ADMIN — FUROSEMIDE 40 MG: 40 TABLET ORAL at 07:47

## 2021-03-26 RX ADMIN — INSULIN LISPRO 1 UNITS: 100 INJECTION, SOLUTION INTRAVENOUS; SUBCUTANEOUS at 17:08

## 2021-03-26 RX ADMIN — ARFORMOTEROL TARTRATE 15 MCG: 15 SOLUTION RESPIRATORY (INHALATION) at 06:22

## 2021-03-26 RX ADMIN — AMIODARONE HYDROCHLORIDE 200 MG: 200 TABLET ORAL at 07:47

## 2021-03-26 RX ADMIN — MICONAZOLE NITRATE: 2 POWDER TOPICAL at 22:26

## 2021-03-26 RX ADMIN — ASPIRIN 81 MG: 81 TABLET, FILM COATED ORAL at 07:47

## 2021-03-26 RX ADMIN — MICONAZOLE NITRATE: 2 POWDER TOPICAL at 07:51

## 2021-03-26 RX ADMIN — TAMSULOSIN HYDROCHLORIDE 0.4 MG: 0.4 CAPSULE ORAL at 22:26

## 2021-03-26 RX ADMIN — NIFEDIPINE 60 MG: 60 TABLET, EXTENDED RELEASE ORAL at 07:47

## 2021-03-26 RX ADMIN — ARFORMOTEROL TARTRATE 15 MCG: 15 SOLUTION RESPIRATORY (INHALATION) at 19:43

## 2021-03-26 RX ADMIN — FERROUS SULFATE TAB 325 MG (65 MG ELEMENTAL FE) 325 MG: 325 (65 FE) TAB at 07:47

## 2021-03-26 RX ADMIN — LINACLOTIDE 145 MCG: 145 CAPSULE, GELATIN COATED ORAL at 06:02

## 2021-03-26 RX ADMIN — BISACODYL 5 MG: 5 TABLET, COATED ORAL at 07:53

## 2021-03-26 RX ADMIN — PANTOPRAZOLE SODIUM 40 MG: 40 TABLET, DELAYED RELEASE ORAL at 07:47

## 2021-03-26 RX ADMIN — BUDESONIDE 500 MCG: 0.5 SUSPENSION RESPIRATORY (INHALATION) at 19:43

## 2021-03-26 RX ADMIN — MICONAZOLE NITRATE: 2 POWDER TOPICAL at 17:08

## 2021-03-26 ASSESSMENT — PAIN SCALES - GENERAL
PAINLEVEL_OUTOF10: 0

## 2021-03-26 NOTE — PROGRESS NOTES
Occupational Therapy  Facility/Department: [facility] 8 REHAB UNIT  Daily Treatment Note  NAME: Karen Jimenez  : 1940  MRN: [id number]    Date of Service: 3/26/2021    Discharge Recommendations:  Subacute/Skilled Nursing Facility, 24 hour supervision or assist(Recommend SNF for further rehab, but pt refuses to go to SNF. Discharging home with assistance with [id number] and his wife.)       Assessment   Assessment: pt able to fit in 20 inch wide wheelchair versus 22\"  Patient Education: O2 safety when at home  Activity Tolerance  Activity Tolerance: Patient Tolerated treatment well  Safety Devices  Safety Devices in place: Yes  Type of devices: Left in chair(left with PT)         Patient Diagnosis(es): There were no encounter diagnoses. has a past medical history of Acute liver failure without hepatic coma, Back pain, Bladder cancer (HCC), Blood circulation, collateral, Carotid arterial disease ([address] CKD (chronic kidney disease), stage II, COPD with acute lower respiratory infection ([address] GERD (gastroesophageal reflux disease), Hyperlipidemia, Hypertension, Hypertension, Palliative care patient, Pneumonia due to infectious organism, Primary osteoarthritis of left knee, PVD (peripheral vascular disease) ([address] Tremor, and Type 2 diabetes mellitus with complication, without long-term current use of insulin ([address] has a past surgical history that includes back surgery; Tonsillectomy and adenoidectomy; Colonoscopy (?); pr colonoscopy flx dx w/collj spec when pfrmd (N/A, 2017); pr revise median n/carpal tunnel surg (Left, 2018); pr thromboendartectmy neck,neck incis (Left, 2018); vascular surgery (2015); vascular surgery (2015); vascular surgery (2014); vascular surgery (2013); pr total knee arthroplasty (Left, 10/15/2018); vascular surgery (10/30/2018); vascular surgery (2018);  Cystoscopy (Bilateral, 2018); and Cardiac catheterization (03/23/2021).     Restrictions  Restrictions/Precautions  Restrictions/Precautions: Surgical Protocols, Fall Risk  Implants present? : Pacemaker  Position Activity Restriction  Other position/activity restrictions: PACEMAKER RESTRICTIONS TO LUE   Subjective   General  Chart Reviewed: Yes, Orders  Patient assessed for rehabilitation services?: Yes  Additional Pertinent Hx: Chronic low back pain, ETOH use, bladder cancer, home O2 prn  Family / Caregiver Present: No  Diagnosis: Respiratory failure with hypoxia, recen pacemaker placement--3/13    Pain Assessment  Pain Assessment: 0-10  Pain Level: 0  Pre Treatment Pain Screening  Pain at present: 0  Scale Used: Numeric Score  Vital Signs  Patient Currently in Pain: No   Objective       Transfers  Stand Step Transfers: Stand by assistance(with RW)  Sit to stand: Stand by assistance  Stand to sit: Stand by assistance     Positioning  Wheelchair Position Type: (L UE arm elevated in wheelchair)  Additional Activities Comment  Additional Activities: L UE edema reduction techs--     Type of ROM/Therapeutic Exercise  Type of ROM/Therapeutic Exercise: Free weights(2# modified due to limited ROM and pacemaker limitation)     Plan   Plan  Specific instructions for Next Treatment: AE, endurance training  Current Treatment Recommendations: Strengthening, Patient/Caregiver Education & Training, Home Management Training, Equipment Evaluation, Education, & procurement, Balance Training, Self-Care / ADL, Safety Education & Training, Endurance Training, Functional Mobility Training    Goals  Short term goals  Time Frame for Short term goals: 1 week  Short term goal 1: pt will complete LB dressing with AE prn with CGA  Short term goal 2: pt will complete overall toileting with CGA  Short term goal 3: pt will complete simple ambulatory home making task with CGA while maintaining pacemaker precautions  Short term goal 4: pt will complete overall bathing with CGA  Short term goal 5: pt will complete 1-2 handed static standing act for 2 mins with supervision  Short term goal 6: pt will complete HEP x 5 occasions within range of pacemaker precautions to improve strength and endurance for ADLs  Long term goals  Time Frame for Long term goals : 2 weeks  Long term goal 1: pt will complete overall dressing with modified independence  Long term goal 2: pt will complete overall toileting with modified independence  Long term goal 3: pt will complete overall bathing with modified independence  Long term goal 4: pt will complete simple ambulatory home making task with modified independence  Long term goal 5: pt will complete HEP with independence  Long term goals 6: pt verbalize DME for home       Therapy Time   Individual Concurrent Group Co-treatment   Time In 1300         Time Out 1345         Minutes 45         Timed Code Treatment Minutes: 45 Minutes     Electronically signed by Neil Martin OT on 3/26/2021 at 3:41 PM

## 2021-03-26 NOTE — CARE COORDINATION
Discharge Planning:  Long discussion with Mr CATHLEEN BREEN and his wife (via speaker phone) regarding his condition- what he is able to do and what he needs assistance with. She has poor retention of information and our experience with a \"family therapy day\" with her in past was not productive, therefore not scheduled in that format. I explained that his functional limitations are endurance and shortness of breath so it would be expected that he would need her to assist him tasks like clothing management at toilet because he would be out of breath, would need to walk with him or he might use wheelchair due shortness of breath. Her response was \"so I follow him with oxygen\"- when I clarified are you expecting to pull a tank or managing a long cord from the concentrator-their explanation was the length of the house exceeded the long cord to the concentrator- Talked about placing chairs approx every 30' through the pathways for rest break and they verify the concentrator is on wheels. She says she wants to try to provide care but \"if he goes down I can't stop him\". Mr. CATHLEEN BREEN agreed to consider skilled care placement if NOT successful at home-we discussed the advantage of listing prior to discharge so that could be expedited if needed-his preference would be [facility]. Also discussed how, financially, that would affect the Trilogy device. He reports he wants to \"now exactly what is wrong with him\" and is interested in seeing advise from provider outside this area (if he gets able to go).   Most importantly he wants to know prognosis and expectations

## 2021-03-26 NOTE — PROGRESS NOTES
Occupational Therapy     03/26/21 0815   General   Additional Pertinent Hx Chronic low back pain, ETOH use, bladder cancer, home O2 prn   Diagnosis Respiratory failure with hypoxia, recen pacemaker placement--3/13   Subjective   Subjective O2 sat dropped from 91% on 3L O2 per nc at rest to 77% post short ambuation to bathroom, easily SOB during seated acts, nursing notified. Pain Assessment   Patient Currently in Pain No   Pain Assessment 0-10   Pain Level 0   Balance   Sitting Balance Independent   Standing Balance Contact guard assistance   Functional Mobility   Functional - Mobility Device Rolling Walker   Activity To/from bathroom   Assist Level Contact guard assistance   Transfers   Sit to stand Contact guard assistance   Stand to sit Contact guard assistance   Transfer Comments RW<>bed. Toilet Transfers   Toilet - Technique Ambulating   Equipment Used Raised toilet seat with rails   Toilet Transfer Moderate assistance   Toilet Transfers Comments Required Mod A d/t fatigue and SOB. Assessment   Performance deficits / Impairments Decreased functional mobility ; Decreased endurance;Decreased strength;Decreased ROM; Decreased balance;Decreased ADL status; Decreased high-level IADLs   Assessment O2 sat at 91% at rest on 3L O2 per nc, dropped to 77% post short ambulation from bed to bathroom, O2 increased to 6L O2 per nc, O2 sat raised and maintained at 90% during toileting and ambulation from bathroom BTB on 6L. Educated pt. on importance of checking O2 and adjusting O2 PRN to maintain at 90% or above once home, pt. demonstrated fair understanding, would benefit from further education. Treatment Diagnosis Pneumonia, recent pacemaker (3-13-21)   Prognosis Good   Timed Code Treatment Minutes 45 Minutes   Activity Tolerance   Activity Tolerance Treatment limited secondary to medical complications (free text)   Activity Tolerance O2 dropped during activity, easily SOB. Nursing notified.    Safety Devices   Safety Devices in place Yes   Type of devices Call light within reach; Bed alarm in place   Plan   Current Treatment Recommendations Strengthening;Patient/Caregiver Education & Training;Home Management Training;Equipment Evaluation, Education, & procurement;Balance Training;Self-Care / ADL; Safety Education & Training; Endurance Training;Functional Mobility Training      03/26/21 0815 [id number] Needed Partial/moderate assistance   CARE Score 3   Shower/Bathe Self   Assistance Needed Partial/moderate assistance   CARE Score 3   Upper Body Dressing   Assistance Needed Supervision or touching assistance   CARE Score 4   Lower Body Dressing   Assistance Needed Partial/moderate assistance   Comment Mod A.    CARE Score 3

## 2021-03-26 NOTE — PROGRESS NOTES
Gamaliel Dada  [id number]     03/26/21 1400 03/26/21 1401 03/26/21 1402   Subjective   Subjective Pt agreed to therapy this afternoon. --   --    Pain Screening   Patient Currently in Pain No  --   --    Bed Mobility   Rolling Stand by assistance  --   --    Supine to Sit Stand by assistance  --   --    Sit to Supine Stand by assistance  --   --    Transfers   Sit to Stand  --  Stand by assistance  --    Stand to sit  --  Stand by assistance  --    Bed to Chair  --  Stand by assistance  --    Car Transfer  --  Stand by assistance  --    Wheelchair Activities   Propulsion  --  Yes  --    Propulsion 1   Propulsion  --  Manual  --    Level  --  Level Tile  --    Method  --  RUE;LUE  --    Level of Assistance  --  Independent  --    Description/ Details  --  150'  --    Distance  --  Pt propelled W/C well. --    Exercises   Comments  --   --  Practiced Car TF and sitting [place] LE ther ex. Conditions Requiring Skilled Therapeutic Intervention   Body structures, Functions, Activity limitations  --   --  Decreased functional mobility ; Decreased ADL status; Decreased endurance;Decreased posture   Assessment  --   --  Pt's O2 continues to drop into the 80's when amb very short distances this afternoon, but recovers to 93% after 3-5 minutes. Pt performed Car Tf well w/ only minor cues. Pt improved to Independent w/ W/C propulsion. Pt tolerated sitting ther ex well.    Prognosis  --   --  Fair   Activity Tolerance   Activity Tolerance  --   --  Patient limited by fatigue;Patient limited by endurance   Electronically signed by Trevor Garay PTA on 3/26/2021 at 2:05 PM

## 2021-03-26 NOTE — PROGRESS NOTES
Spoke with RAFY Donahue for 8th floor rehab. [address] says pt needs a greater understanding of his chronic diagnoses. [address] and this  agreed that pt's dr may need to have an in depth conversation to help him understand his diagnoses and treatment options. Palliative is currently following this pt and this  will ask palliative care nurse to visit on Monday to discuss goals of care and possible treatment options. Pt is participating in therapy currently and palliative care will continue to follow.     Electronically signed by David Leonard on 3/26/2021 at 3:07 PM

## 2021-03-27 LAB
GLUCOSE BLD-MCNC: 139 MG/DL (ref 70–99)
GLUCOSE BLD-MCNC: 176 MG/DL (ref 70–99)
GLUCOSE BLD-MCNC: 196 MG/DL (ref 70–99)
GLUCOSE BLD-MCNC: 215 MG/DL (ref 70–99)
PERFORMED ON: ABNORMAL

## 2021-03-27 PROCEDURE — 6360000002 HC RX W HCPCS: Performed by: INTERNAL MEDICINE

## 2021-03-27 PROCEDURE — 99232 SBSQ HOSP IP/OBS MODERATE 35: CPT | Performed by: PSYCHIATRY & NEUROLOGY

## 2021-03-27 PROCEDURE — 94640 AIRWAY INHALATION TREATMENT: CPT

## 2021-03-27 PROCEDURE — 6370000000 HC RX 637 (ALT 250 FOR IP): Performed by: INTERNAL MEDICINE

## 2021-03-27 PROCEDURE — 2700000000 HC OXYGEN THERAPY PER DAY

## 2021-03-27 PROCEDURE — 6370000000 HC RX 637 (ALT 250 FOR IP): Performed by: HOSPITALIST

## 2021-03-27 PROCEDURE — 1180000000 HC REHAB R&B

## 2021-03-27 PROCEDURE — 97535 SELF CARE MNGMENT TRAINING: CPT

## 2021-03-27 PROCEDURE — 97530 THERAPEUTIC ACTIVITIES: CPT

## 2021-03-27 PROCEDURE — 6370000000 HC RX 637 (ALT 250 FOR IP): Performed by: PSYCHIATRY & NEUROLOGY

## 2021-03-27 PROCEDURE — 82947 ASSAY GLUCOSE BLOOD QUANT: CPT

## 2021-03-27 RX ADMIN — BUDESONIDE 500 MCG: 0.5 SUSPENSION RESPIRATORY (INHALATION) at 20:24

## 2021-03-27 RX ADMIN — TAMSULOSIN HYDROCHLORIDE 0.4 MG: 0.4 CAPSULE ORAL at 20:45

## 2021-03-27 RX ADMIN — MICONAZOLE NITRATE: 2 POWDER TOPICAL at 08:56

## 2021-03-27 RX ADMIN — ARFORMOTEROL TARTRATE 15 MCG: 15 SOLUTION RESPIRATORY (INHALATION) at 06:28

## 2021-03-27 RX ADMIN — PANTOPRAZOLE SODIUM 40 MG: 40 TABLET, DELAYED RELEASE ORAL at 08:52

## 2021-03-27 RX ADMIN — ATORVASTATIN CALCIUM 20 MG: 20 TABLET, FILM COATED ORAL at 08:52

## 2021-03-27 RX ADMIN — INSULIN LISPRO 1 UNITS: 100 INJECTION, SOLUTION INTRAVENOUS; SUBCUTANEOUS at 17:06

## 2021-03-27 RX ADMIN — ASPIRIN 81 MG: 81 TABLET, FILM COATED ORAL at 08:52

## 2021-03-27 RX ADMIN — METOPROLOL SUCCINATE 100 MG: 50 TABLET, EXTENDED RELEASE ORAL at 08:54

## 2021-03-27 RX ADMIN — MICONAZOLE NITRATE: 2 POWDER TOPICAL at 12:26

## 2021-03-27 RX ADMIN — BUDESONIDE 500 MCG: 0.5 SUSPENSION RESPIRATORY (INHALATION) at 06:28

## 2021-03-27 RX ADMIN — MICONAZOLE NITRATE: 2 POWDER TOPICAL at 20:44

## 2021-03-27 RX ADMIN — AMIODARONE HYDROCHLORIDE 200 MG: 200 TABLET ORAL at 08:52

## 2021-03-27 RX ADMIN — TAMSULOSIN HYDROCHLORIDE 0.4 MG: 0.4 CAPSULE ORAL at 08:52

## 2021-03-27 RX ADMIN — BISACODYL 5 MG: 5 TABLET, COATED ORAL at 20:47

## 2021-03-27 RX ADMIN — AMIODARONE HYDROCHLORIDE 200 MG: 200 TABLET ORAL at 20:45

## 2021-03-27 RX ADMIN — FERROUS SULFATE TAB 325 MG (65 MG ELEMENTAL FE) 325 MG: 325 (65 FE) TAB at 08:52

## 2021-03-27 RX ADMIN — INSULIN LISPRO 2 UNITS: 100 INJECTION, SOLUTION INTRAVENOUS; SUBCUTANEOUS at 12:16

## 2021-03-27 RX ADMIN — FUROSEMIDE 40 MG: 40 TABLET ORAL at 17:04

## 2021-03-27 RX ADMIN — LINACLOTIDE 145 MCG: 145 CAPSULE, GELATIN COATED ORAL at 05:32

## 2021-03-27 RX ADMIN — FERROUS SULFATE TAB 325 MG (65 MG ELEMENTAL FE) 325 MG: 325 (65 FE) TAB at 17:04

## 2021-03-27 RX ADMIN — FOLIC ACID 1 MG: 1 TABLET ORAL at 08:52

## 2021-03-27 RX ADMIN — ARFORMOTEROL TARTRATE 15 MCG: 15 SOLUTION RESPIRATORY (INHALATION) at 20:24

## 2021-03-27 RX ADMIN — NIFEDIPINE 60 MG: 60 TABLET, EXTENDED RELEASE ORAL at 08:52

## 2021-03-27 RX ADMIN — FUROSEMIDE 40 MG: 40 TABLET ORAL at 08:52

## 2021-03-27 NOTE — PROGRESS NOTES
Hospitalist Progress Note  3/27/2021 11:05 AM  Subjective:   Admit Date: 3/15/2021  PCP: Concepcion Ho MD    Chief Complaint: dyspnea    Subjective: Patient is feeling much better with minimal dyspnea. Markedly improved edema of the upper extremities. He has questions about diet and medications and wants to be sure that his PCP is informed of medication changes. Sees nephrology every 3 months on outpatient basis. History is otherwise unchanged. ROS: 14 point review of systems is negative except as specifically addressed above.     DIET CARDIAC; Daily Fluid Restriction: 1500 ml    Intake/Output Summary (Last 24 hours) at 3/27/2021 1105  Last data filed at 3/27/2021 0956  Gross per 24 hour   Intake 960 ml   Output 1000 ml   Net -40 ml     Medications:   sodium chloride      dextrose       Current Facility-Administered Medications   Medication Dose Route Frequency Provider Last Rate Last Admin    furosemide (LASIX) tablet 40 mg  40 mg Oral BID Jet Holt MD   40 mg at 03/27/21 0852    budesonide (PULMICORT) nebulizer suspension 500 mcg  0.5 mg Nebulization BID Carmela Iniguez MD   500 mcg at 03/27/21 9810    Arformoterol Tartrate (BROVANA) nebulizer solution 15 mcg  15 mcg Nebulization BID Carmela Iniguez MD   15 mcg at 03/27/21 4409    0.9 % sodium chloride infusion   Intravenous PRN Terell Luciano MD        linaclotide [facility]) capsule 145 mcg  145 mcg Oral QAM AC Spike Hess MD   145 mcg at 03/27/21 0532    ondansetron (ZOFRAN) injection 4 mg  4 mg Intravenous Q6H PRN Rafael Us MD        sodium chloride flush 0.9 % injection 10 mL  10 mL Intravenous PRN Rafael Us MD   10 mL at 03/22/21 1650    acetaminophen (TYLENOL) suppository 650 mg  650 mg Rectal Q6H PRN Rafael Us MD        dextrose 5 % solution  100 mL/hr Intravenous PRN Rafael Us MD        dextrose 50 % IV solution  12.5 g Intravenous PRN Rafael Us MD  glucagon (rDNA) injection 1 mg  1 mg Intramuscular PRN Los Oliva MD        glucose (GLUTOSE) 40 % oral gel 15 g  15 g Oral PRN Los Oliva MD        amiodarone (CORDARONE) tablet 200 mg  200 mg Oral BID Los Oliva MD   200 mg at 03/27/21 3631    aspirin EC tablet 81 mg  81 mg Oral Daily Los Oliva MD   81 mg at 03/27/21 4008    atorvastatin (LIPITOR) tablet 20 mg  20 mg Oral Daily Los Oliva MD   20 mg at 03/27/21 2945    bisacodyl (DULCOLAX) EC tablet 5 mg  5 mg Oral Daily PRN Los Oliva MD   5 mg at 03/26/21 0753    calcium carbonate (TUMS) chewable tablet 500 mg  500 mg Oral TID PRN Los Oliva MD        ferrous sulfate (IRON 325) tablet 325 mg  325 mg Oral BID  Los Oliva MD   325 mg at 99/08/04 0567    folic acid (FOLVITE) tablet 1 mg  1 mg Oral Daily Los Oliva MD   1 mg at 03/27/21 0852    guaiFENesin (ROBITUSSIN) 100 MG/5ML liquid 200 mg  200 mg Oral Q4H PRN Los Oliva MD        insulin lispro (HUMALOG) injection vial 0-6 Units  0-6 Units Subcutaneous TID  Los Oliva MD   1 Units at 03/26/21 1708    insulin lispro (HUMALOG) injection vial 0-3 Units  0-3 Units Subcutaneous Nightly Los Oliva MD   1 Units at 03/26/21 2227    ipratropium-albuterol (DUONEB) nebulizer solution 1 ampule  1 ampule Inhalation Q4H PRN Los Oliva MD   1 ampule at 03/22/21 1005    metoprolol succinate (TOPROL XL) extended release tablet 100 mg  100 mg Oral Daily Los Oliva MD   100 mg at 03/27/21 0854    miconazole (MICOTIN) 2 % powder   Topical 4x Daily Los Oliva MD   Given at 03/27/21 0856    NIFEdipine (PROCARDIA XL) extended release tablet 60 mg  60 mg Oral Daily Los Oliva MD   60 mg at 03/27/21 0852    pantoprazole (PROTONIX) tablet 40 mg  40 mg Oral Daily Los Oliva MD   40 mg at 03/27/21 7650    tamsulosin (FLOMAX) capsule 0.4 mg  0.4 mg Oral BID Virgil Main MD   0.4 mg at 03/27/21 6749    vitamin D (ERGOCALCIFEROL) capsule 50,000 Units  50,000 Units Oral Weekly Virgil Main MD   50,000 Units at 03/22/21 0818    acetaminophen (TYLENOL) tablet 650 mg  650 mg Oral Q4H PRN [facility] MD Cierra        polyethylene glycol (GLYCOLAX) packet 17 g  17 g Oral Daily PRN [facility] MD Cierra            Labs:     Recent Labs     03/25/21  0508   WBC 5.2   RBC 2.85*   HGB 8.4*   HCT 28.1*   MCV 98.6*   MCH 29.5   MCHC 29.9*   *     Recent Labs     03/25/21  0508      K 4.0   ANIONGAP 10      CO2 32*   BUN 44*   CREATININE 2.0*   GLUCOSE 159*   CALCIUM 8.4*     No results for input(s): MG, PHOS in the last 72 hours. Recent Labs     03/25/21  0508   AST 10   ALT 13   BILITOT 0.8   ALKPHOS 112     ABGs:No results for input(s): PH, PO2, PCO2, HCO3, BE, O2SAT in the last 72 hours. Troponin T: No results for input(s): TROPONINI in the last 72 hours. INR:   No results for input(s): INR in the last 72 hours. Lactic Acid: No results for input(s): LACTA in the last 72 hours.     Objective:   Vitals: /60   Pulse 70   Temp 96.6 °F (35.9 °C) (Temporal)   Resp 20   Ht 6' (1.829 m)   Wt 237 lb 14.4 oz (107.9 kg)   SpO2 91%   BMI 32.27 kg/m²   24HR INTAKE/OUTPUT:      Intake/Output Summary (Last 24 hours) at 3/27/2021 1105  Last data filed at 3/27/2021 0956  Gross per 24 hour   Intake 960 ml   Output 1000 ml   Net -40 ml     General appearance: alert and cooperative with exam  HEENT: atraumatic, eyes with clear conjunctiva and normal lids, pupils and irises normal, external ears and nose are normal, lips normal  Neck without masses, lympadenopathy, bruit, thyroid normal  Lungs: no increased work of breathing, diminished breath sounds bibasilar  Heart: regular rate and rhythm, S1, S2 normal, no murmur, click, rub or gallop  Abdomen: soft, non-tender; bowel sounds normal; no masses,  no organomegaly and obese  Extremities: edema trace to 1+ bilaterally, modified Micky's negative  Neurologic: no focal neurologic deficits, normal sensation, alert and oriented, affect and mood appropriate  Skin: no rashes, nodules    Assessment and Plan:   Principal Problem:    Acute on chronic respiratory failure (HCC)  Active Problems:    Bilateral carotid artery stenosis    Atherosclerosis of native artery of both lower extremities with intermittent claudication (HCC)    Primary osteoarthritis of left knee    Arthritis of knee    Essential hypertension    Pure hypercholesterolemia    Iron deficiency anemia    GERD (gastroesophageal reflux disease)    CHF (congestive heart failure) (HCC)    Thrombocytopenia (HCC)    History of bladder cancer    Acute respiratory failure (HCC)    Chronic midline low back pain without sciatica    Chronic kidney disease    Atrial fibrillation (HCC)    Vitamin D deficiency    Anemia    Alcohol use    Pacemaker    Class 1 obesity in adult  Resolved Problems:    * No resolved hospital problems. *      Continue diuresis with furosemide 40 mg BID. Seems to be diuresing acceptably, though I&O data has been inconsistent. Weight down markedly from last check today. Discussed the importance of daily weights and the need to call PCP for dosage adjustments with weight gain of greater than 2 pounds. LVEF preserved, no absolute indication for ACEI or ARB. Continue diuresis on outpatient basis with weekly monitoring of electrolytes at first, then monthly. Medically stable for discharge to home when ready from rehab perspective. Will sign off. Please reconsult if needed.     Advance Directive: Full Code    DVT prophylaxis: SCDs    Discharge planning: to home soon      DO Viktoria Weiss Hospitalist

## 2021-03-27 NOTE — PLAN OF CARE
Problem: Falls - Risk of:  Goal: Will remain free from falls  Description: Will remain free from falls  3/27/2021 1539 by Niko Tom RN  Outcome: Ongoing  3/27/2021 0307 by Israel Mahajan LPN  Outcome: Ongoing  Goal: Absence of physical injury  Description: Absence of physical injury  3/27/2021 1539 by Niko Tom RN  Outcome: Ongoing  3/27/2021 0307 by Israel Mahajan LPN  Outcome: Ongoing     Problem: IP BLADDER/VOIDING  Goal: LTG - patient will complete bladder elimination  3/27/2021 1539 by Niko Tom RN  Outcome: Ongoing  3/27/2021 0307 by Israel Mahajan LPN  Outcome: Ongoing  Goal: LTG - Patient will utilize adaptive techniques/equipment to complete bladder elimination  3/27/2021 1539 by Niko Tom RN  Outcome: Ongoing  3/27/2021 0307 by Israel Mahajan LPN  Outcome: Ongoing  Goal: LTG - patient will achieve acceptable level of continence  3/27/2021 1539 by Niko Tom RN  Outcome: Ongoing  3/27/2021 0307 by Israel Mahajan LPN  Outcome: Ongoing  Goal: STG - Patient demonstrates ability to take care of indwelling catheter  3/27/2021 1539 by Niko Tom RN  Outcome: Ongoing  3/27/2021 0307 by Israel Mahajan LPN  Outcome: Ongoing  Goal: STG - patient demonstrates self-cath technique using clean technique and care of the catheter  3/27/2021 1539 by Niko Tom RN  Outcome: Ongoing  3/27/2021 0307 by Israel Mahajan LPN  Outcome: Ongoing  Goal: STG - Patient demonstrates no accidents  3/27/2021 1539 by Niko Tom RN  Outcome: Ongoing  3/27/2021 0307 by Israel Mahajan LPN  Outcome: Ongoing  Goal: STG - Patient will state signs and symptoms of UTI  3/27/2021 1539 by Niko Tom RN  Outcome: Ongoing  3/27/2021 0307 by Israel Mahajan LPN  Outcome: Ongoing  Goal: STG - patient will be able to empty bladder  3/27/2021 1539 by Niko Tom RN  Outcome: Ongoing  3/27/2021 0307 by Israel Mahajan LPN  Outcome: Ongoing  Goal: STG - Patient demonstrates understanding of self catheterization schedule by completing task on time  3/27/2021 1539 by Jada Nicolas RN  Outcome: Ongoing  3/27/2021 0307 by Michelle Peng LPN  Outcome: Ongoing  Goal: STG - patient participates in bladder program by expressing need to void  3/27/2021 1539 by Jada Nicolas RN  Outcome: Ongoing  3/27/2021 0307 by Michelle Peng LPN  Outcome: Ongoing  Goal: STG - Patient verbalizes understanding of catheter care indwelling/intermittent  3/27/2021 1539 by Jada Nicolas RN  Outcome: Ongoing  3/27/2021 0307 by Michelle Peng LPN  Outcome: Ongoing  Goal: STG - patient participates in bladder program by adhering to implemented toileting schedule  3/27/2021 1539 by Jada Nicolas RN  Outcome: Ongoing  3/27/2021 0307 by Michelle Peng LPN  Outcome: Ongoing     Problem: IP BOWEL ELIMINATION  Goal: LTG - patient will utilize adaptive techniques/equipment to complete bowel elimination  3/27/2021 1539 by Jada Nicolas RN  Outcome: Ongoing  3/27/2021 0307 by Michelle Peng LPN  Outcome: Ongoing  Goal: LTG - patient will have regular and routine bowel evacuation  3/27/2021 1539 by Jada Nicolas RN  Outcome: Ongoing  3/27/2021 0307 by Michelle Peng LPN  Outcome: Ongoing  Goal: STG - patient will be accident free  3/27/2021 1539 by Jada Nicolas RN  Outcome: Ongoing  3/27/2021 0307 by Michelle Peng LPN  Outcome: Ongoing  Goal: STG - Patient demonstrates care and management of ostomy bag  3/27/2021 1539 by Jada Nicolas RN  Outcome: Ongoing  3/27/2021 0307 by Michelle Peng LPN  Outcome: Ongoing  Goal: STG - Patient participates in bowel management program  3/27/2021 1539 by Jada Nicolas RN  Outcome: Ongoing  3/27/2021 0307 by Michelle Peng LPN  Outcome: Ongoing  Goal: STG - patient maintains skin integrity  3/27/2021 1539 by Jada Nicolas RN  Outcome: Ongoing  3/27/2021 0307 by Michelle Hamilton, LPN  Outcome: Ongoing  Goal: STG - Patient will verbalize signs and symptoms of constipation and how to prevent/alleviate  3/27/2021 1539 by Angy Mccall RN  Outcome: Ongoing  3/27/2021 0307 by Bhanu Haq LPN  Outcome: Ongoing  Goal: STG - patient will be continent of stool  3/27/2021 1539 by Angy Mccall RN  Outcome: Ongoing  3/27/2021 0307 by Bhanu Haq LPN  Outcome: Ongoing  Goal: STG - Patient completes digital stimulation technique  3/27/2021 1539 by Angy Mccall RN  Outcome: Ongoing  3/27/2021 0307 by Bhanu Haq LPN  Outcome: Ongoing  Goal: STG - Patient verbalizes knowledge about relationship between diet, fluid intake, activity and medication on constipation  3/27/2021 1539 by Angy Mccall RN  Outcome: Ongoing  3/27/2021 0307 by Bhanu Haq LPN  Outcome: Ongoing     Problem: IP BREATHING  Goal: LTG - patient will mobilize secretions and maintain airway  3/27/2021 1539 by Angy Mccall RN  Outcome: Ongoing  3/27/2021 0307 by Bhanu Haq LPN  Outcome: Ongoing  Goal: LTG - Patient/caregiver will demonstrate/perform proper techniques to maintain patent airway  3/27/2021 1539 by Angy Mccall RN  Outcome: Ongoing  3/27/2021 0307 by Bhanu Haq LPN  Outcome: Ongoing  Goal: LTG - patient/caregiver will demonstrate/perform improved or stable self care abilities within physical limitations  3/27/2021 1539 by Angy Mccall RN  Outcome: Ongoing  3/27/2021 0307 by Bhanu Haq LPN  Outcome: Ongoing  Goal: STG - Patient/caregiver will maintain patent airway  3/27/2021 1539 by Angy Mccall RN  Outcome: Ongoing  3/27/2021 0307 by Bhanu Haq LPN  Outcome: Ongoing  Goal: STG - respiratory rate and effort will be within normal limits for the patient  3/27/2021 1539 by Angy Mccall RN  Outcome: Ongoing  3/27/2021 0307 by Bhanu Haq LPN  Outcome: Ongoing  Goal: STG - patient/caregiver will be able to verbalize oxygen safety precautions  3/27/2021 1539 by Susan Rivera RN  Outcome: Ongoing  3/27/2021 0307 by Bishop Janene LPN  Outcome: Ongoing  Goal: STG - Patient/caregiver demonstrates correct suctioning technique  3/27/2021 1539 by Susan Rivera RN  Outcome: Ongoing  3/27/2021 0307 by Bishop Janene LPN  Outcome: Ongoing  Goal: STG - patient will utilize incentive spirometer  3/27/2021 1539 by Susan Rivera RN  Outcome: Ongoing  3/27/2021 0307 by Bishop Janene LPN  Outcome: Ongoing  Goal: STG - Patient performs or directs assisted coughing  3/27/2021 1539 by Susan Rivera RN  Outcome: Ongoing  3/27/2021 0307 by Bishop Janene LPN  Outcome: Ongoing  Goal: STG - patient can administer MDI's  3/27/2021 1539 by Susan Rivera RN  Outcome: Ongoing  3/27/2021 0307 by Bishop Janene LPN  Outcome: Ongoing  Goal: STG - Patient can utilize incentive spirometer  3/27/2021 1539 by Susan Rivera RN  Outcome: Ongoing  3/27/2021 0307 by Bishop Janene LPN  Outcome: Ongoing  Goal: STG - family can complete suctioning  3/27/2021 1539 by Susan Rivera RN  Outcome: Ongoing  3/27/2021 0307 by Bishop Janene LPN  Outcome: Ongoing     Problem: NUTRITION  Goal: Patient maintains adequate hydration  3/27/2021 1539 by Susan Rivera RN  Outcome: Ongoing  3/27/2021 0307 by Bishop Janene LPN  Outcome: Ongoing  Goal: Patient maintains weight  3/27/2021 1539 by Susan Rivera RN  Outcome: Ongoing  3/27/2021 0307 by Bishop Janene LPN  Outcome: Ongoing  Goal: Patient/Family demonstrates understanding of diet  3/27/2021 1539 by Susan Rivera RN  Outcome: Ongoing  3/27/2021 0307 by Bishop Janene LPN  Outcome: Ongoing  Goal: Patient/Family independently completes tube feeding  3/27/2021 1539 by Susan Rivera RN  Outcome: Ongoing  3/27/2021 0307 by Bishop Janene LPN  Outcome: Ongoing  Goal: Patient will have no more than 5 lb weight change during LOS  3/27/2021 1539 by Susan Rivera, RN  Outcome: Ongoing  3/27/2021 0307 by Gayle Perkins LPN  Outcome: Ongoing  Goal: Patient will utilize adaptive techniques to administer nutrition  3/27/2021 1539 by Trevor Lindsey RN  Outcome: Ongoing  3/27/2021 0307 by Gayle Perkins LPN  Outcome: Ongoing  Goal: Patient will verbalize dietary restrictions  3/27/2021 1539 by Trevor Lindsey RN  Outcome: Ongoing  3/27/2021 0307 by Gayle Perkins LPN  Outcome: Ongoing     Problem: SAFETY  Goal: LTG - patient will adhere to hip precautions during ADL's and transfers  3/27/2021 1539 by Trevor Lindsey RN  Outcome: Ongoing  3/27/2021 0307 by Gayle Perkins LPN  Outcome: Ongoing  Goal: LTG - Patient will demonstrate safety requirements appropriate to situation/environment  3/27/2021 1539 by Trevor Lindsey RN  Outcome: Ongoing  3/27/2021 0307 by Gayle Perkins LPN  Outcome: Ongoing  Goal: LTG - patient will utilize safety techniques  3/27/2021 1539 by Trevor Lindsey RN  Outcome: Ongoing  3/27/2021 0307 by Gayle Perkins LPN  Outcome: Ongoing  Goal: STG - patient locks brakes on wheelchair  3/27/2021 1539 by Trevor Lindsey RN  Outcome: Ongoing  3/27/2021 0307 by Gayle Perkins LPN  Outcome: Ongoing  Goal: STG - Patient uses call light consistently to request assistance with transfers  3/27/2021 1539 by Trevor Lindsey RN  Outcome: Ongoing  3/27/2021 0307 by Gayle Perkins LPN  Outcome: Ongoing  Goal: STG - patient uses gait belt during all transfers  3/27/2021 1539 by Trevor Lindsey RN  Outcome: Ongoing  3/27/2021 0307 by Gayle Perkins LPN  Outcome: Ongoing     Problem: SKIN INTEGRITY  Goal: LTG - Patient will be free from infection  3/27/2021 1539 by Trevor Lindsey RN  Outcome: Ongoing  3/27/2021 0307 by Gayle Perkins LPN  Outcome: Ongoing  Goal: LTG - patient will maintain/improve skin integrity through proper skin care techniques  3/27/2021 1539 by Trevor Lindsey RN  Outcome: Ongoing  3/27/2021 0307 by Ap Corral LPN  Outcome: Ongoing  Goal: LTG - Patient will demonstrate appropriate pressure relief techniques  3/27/2021 1539 by Minh Hernandez RN  Outcome: Ongoing  3/27/2021 0307 by Ap Corral LPN  Outcome: Ongoing  Goal: LTG - patient will demonstrate appropriate skin care techniques  3/27/2021 1539 by Minh Hernandez RN  Outcome: Ongoing  3/27/2021 0307 by Ap Corral LPN  Outcome: Ongoing  Goal: LTG - Patient will be free from infection  3/27/2021 1539 by Minh Hernandez RN  Outcome: Ongoing  3/27/2021 0307 by Ap Corral LPN  Outcome: Ongoing  Goal: STG - Patient demonstrates skin care/treatment/dressing change  3/27/2021 1539 by Minh Hernandez RN  Outcome: Ongoing  3/27/2021 0307 by Ap Corral LPN  Outcome: Ongoing  Goal: STG - patient will maintain good skin integrity  3/27/2021 1539 by Minh Hernandez RN  Outcome: Ongoing  3/27/2021 0307 by Ap Corral LPN  Outcome: Ongoing  Goal: STG - Patient exhibits signs of wound healing.  3/27/2021 1539 by Minh Hernandez RN  Outcome: Ongoing  3/27/2021 0307 by Ap Corral LPN  Outcome: Ongoing  Goal: STG - patient demonstrates pressure reduction techniques  3/27/2021 1539 by Minh Hernandez RN  Outcome: Ongoing  3/27/2021 0307 by Ap Corral LPN  Outcome: Ongoing  Goal: STG - Patient demonstrates preventative skin care measures  3/27/2021 1539 by Minh Hernandez RN  Outcome: Ongoing  3/27/2021 0307 by Ap Corral LPN  Outcome: Ongoing     Problem: PAIN  Goal: LTG - Patient will manage pain with the appropriate technique/Intervention  3/27/2021 1539 by Minh Hernandez RN  Outcome: Ongoing  3/27/2021 0307 by Ap Corral LPN  Outcome: Ongoing  Goal: LTG - Patient will demonstrate intervention for managing pain  3/27/2021 1539 by Minh Hernandez RN  Outcome: Ongoing  3/27/2021 0307 by Ap Imus, LPN  Outcome: Ongoing  Goal: STG - Patient will reduce or eliminate use of

## 2021-03-27 NOTE — PROGRESS NOTES
Occupational Therapy  Facility/Department: [facility] 8 REHAB UNIT  Daily Treatment Note  NAME: Lucrecia Mello  : 1940  MRN: [id number]    Date of Service: 3/27/2021    Discharge Recommendations:  Subacute/Skilled Nursing Facility, 24 hour supervision or assist       Assessment   Performance deficits / Impairments: Decreased functional mobility ; Decreased endurance;Decreased strength;Decreased ROM; Decreased balance;Decreased ADL status; Decreased high-level IADLs  Assessment: Pt tolerated tx well. Pt benefits from continued OT services to return to PLOF. Treatment Diagnosis: Pneumonia, recent pacemaker (3-13-21)  Prognosis: Good  Activity Tolerance  Activity Tolerance: Patient Tolerated treatment well         Patient Diagnosis(es): There were no encounter diagnoses. has a past medical history of Acute liver failure without hepatic coma, Back pain, Bladder cancer (HCC), Blood circulation, collateral, Carotid arterial disease ([address] CKD (chronic kidney disease), stage II, COPD with acute lower respiratory infection ([address] GERD (gastroesophageal reflux disease), Hyperlipidemia, Hypertension, Hypertension, Palliative care patient, Pneumonia due to infectious organism, Primary osteoarthritis of left knee, PVD (peripheral vascular disease) ([address] Tremor, and Type 2 diabetes mellitus with complication, without long-term current use of insulin ([address] has a past surgical history that includes back surgery; Tonsillectomy and adenoidectomy; Colonoscopy (?); pr colonoscopy flx dx w/collj spec when pfrmd (N/A, 2017); pr revise median n/carpal tunnel surg (Left, 2018); pr thromboendartectmy neck,neck incis (Left, 2018); vascular surgery (2015); vascular surgery (2015); vascular surgery (2014); vascular surgery (2013); pr total knee arthroplasty (Left, 10/15/2018); vascular surgery (10/30/2018); vascular surgery (2018);  Cystoscopy (Bilateral, 2018); and Cardiac catheterization (03/23/2021). Restrictions  Restrictions/Precautions  Restrictions/Precautions: Surgical Protocols, Fall Risk  Implants present? : Pacemaker  Position Activity Restriction  Other position/activity restrictions: PACEMAKER RESTRICTIONS TO LUE   Subjective   General  Chart Reviewed: Yes, Orders  Patient assessed for rehabilitation services?: Yes  Additional Pertinent Hx: Chronic low back pain, ETOH use, bladder cancer, home O2 prn  Response to previous treatment: Patient with no complaints from previous session  Family / Caregiver Present: No  Diagnosis: Respiratory failure with hypoxia, recen pacemaker placement--3/13  Subjective  Subjective: O2 sat dropped from 91% on 3L O2 per nc at rest to 77% post short ambuation to bathroom, easily SOB during seated acts, nursing notified.   Vital Signs  Patient Currently in Pain: Denies   Orientation     Objective    ADL  UE Dressing: Stand by assistance  LE Dressing: Contact guard assistance        Balance  Sitting Balance: Independent  Standing Balance: Contact guard assistance  Bed mobility  Supine to Sit: Supervision  Transfers  Stand Step Transfers: Stand by assistance  Sit to stand: Stand by assistance  Stand to sit: Stand by assistance                                                           Plan   Plan  Specific instructions for Next Treatment: AE, endurance training  Current Treatment Recommendations: Strengthening, Patient/Caregiver Education & Training, Home Management Training, Equipment Evaluation, Education, & procurement, Balance Training, Self-Care / ADL, Safety Education & Training, Endurance Training, Functional Mobility Training  Goals  Short term goals  Time Frame for Short term goals: 1 week  Short term goal 1: pt will complete LB dressing with AE prn with CGA  Short term goal 2: pt will complete overall toileting with CGA  Short term goal 3: pt will complete simple ambulatory home making task with CGA while maintaining pacemaker precautions  Short term goal 4: pt will complete overall bathing with CGA  Short term goal 5: pt will complete 1-2 handed static standing act for 2 mins with supervision  Short term goal 6: pt will complete HEP x 5 occasions within range of pacemaker precautions to improve strength and endurance for ADLs  Long term goals  Time Frame for Long term goals : 2 weeks  Long term goal 1: pt will complete overall dressing with modified independence  Long term goal 2: pt will complete overall toileting with modified independence  Long term goal 3: pt will complete overall bathing with modified independence  Long term goal 4: pt will complete simple ambulatory home making task with modified independence  Long term goal 5: pt will complete HEP with independence  Long term goals 6: pt verbalize DME for home       Therapy Time   Individual Concurrent Group Co-treatment   Time In 0815         Time Out 0900         Minutes 45         Timed Code Treatment Minutes: [id number]  Electronically signed by YSABEL Otto on 3/27/2021 at 10:08 AM

## 2021-03-27 NOTE — PROGRESS NOTES
abdominal pain    Genitourinary: No Dysuria  Neurological: No headache, no confusion    Past Medical History:      Diagnosis Date    Acute liver failure without hepatic coma 10/23/2018    Back pain     \"with tired legs as a result\"    Bladder cancer ([address] 12/19/2018    Blood circulation, collateral     Carotid arterial disease ([address]     recent surgery    CKD (chronic kidney disease), 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/2018    COPD with acute lower respiratory infection ([address] 2/24/2021    GERD (gastroesophageal reflux disease)     Hyperlipidemia     Hypertension     Hypertension     Palliative care patient 10/23/2018    Pneumonia due to infectious organism 11/06/2018    Primary osteoarthritis of left knee 10/14/2018    PVD (peripheral vascular disease) (HCC)     Tremor     Tremor on Right side x 1-2 weeks per stepdaughter    Type 2 diabetes mellitus with complication, without long-term current use of insulin ([address] 1/21/2021       Past Surgical History:      Procedure Laterality Date    BACK SURGERY      CARDIAC CATHETERIZATION  03/23/2021    100% RCA    COLONOSCOPY  2007?     CYSTOSCOPY Bilateral 12/19/2018    CYSTOSCOPY, BIOSPY FULGURATION OF BLADDER TUMOR POSSIBLE TURBT, RETROGRADE PYELOGRAM performed by Chance Gibbons MD at [facility] COLONOSCOPY FLX DX W/COLLJ SPEC WHEN PFRMD N/A 09/11/2017    Dr Santos Jasmine internal hemorrhoids, diverticular disease-HP-No recall (age)   [facility] ID REVISE MEDIAN N/CARPAL TUNNEL SURG Left 07/18/2018    OPEN CARPAL TUNNEL RELEASE performed by Beth Condon MD at [address] [place] Left 08/28/2018    LEFT CAROTID ENDARTERECTOMY WITH VEIN PATCH ANGIOPLASTY AND COMPLETION ANGIOGRAM performed by Toña Medellin MD at [place] [address] Left 10/15/2018    LEFT COMPLEX TOTAL KNEE ARTHROPLASTY performed by Beth Condon MD at [facility] VASCULAR SURGERY  04/21/2015    Kana Mirza M. D.Ultrasound guided access of left common femoral artery. Aortogram.Diagnostic right lower extremity arteriogram.Radiologic supervision and interpretation.  VASCULAR SURGERY  01/13/2015    Mona Beth M.D Atherectomy,angioplasty,and stenting of left superficial femoral artery.  VASCULAR SURGERY  03/11/2014    Mona Beth M.D. Ultrasound-guided access of right common femoral artery. Aortogram.Left lower extremity arteriogram.Atherectomy and angioplasty of left superficial femoral artery. Radiologic supervision and interpretation.  VASCULAR SURGERY  01/18/2013    Mona BESS Aortogram.Multistation arteriogram right lower extremity. Laser atherectomy and angioplasty of right superficial femoral artery. Selective catheterization of right tibioperoneal trunk. Angioplasty of peroneal artery and tibioperoneal trunk.  VASCULAR SURGERY  10/30/2018    [facility]. Ultrasound guided cannulation of right internal vein. Placement of right internal jugular vein tunneled dialysis catheter bard equistream xk 23cm tip to cuff    VASCULAR SURGERY  12/17/2018    [facility]. Removal of tunneled dilaysis catheter right internal jugular vein.        Medications in Hospital:      Current Facility-Administered Medications:     furosemide (LASIX) tablet 40 mg, 40 mg, Oral, BID, Jet Holt MD, 40 mg at 03/27/21 0852    budesonide (PULMICORT) nebulizer suspension 500 mcg, 0.5 mg, Nebulization, BID, Cris Chung MD, 500 mcg at 03/27/21 7499    Arformoterol Tartrate (BROVANA) nebulizer solution 15 mcg, 15 mcg, Nebulization, BID, Cris Cuhng MD, 15 mcg at 03/27/21 9742    0.9 % sodium chloride infusion, , Intravenous, PRN, Nacho Quick MD    linaclotide [facility]) capsule 145 mcg, 145 mcg, Oral, QAM AC, Laila Hoyos MD, 145 mcg at 03/27/21 0532    ondansetron (ZOFRAN) injection 4 mg, 4 mg, Intravenous, Q6H PRN, Agatha Lott MD    sodium chloride flush 0.9 % injection 10 mL, 10 mL, Intravenous, PRN, Mary Oneal MD, 10 mL at 03/22/21 1650    [DISCONTINUED] acetaminophen (TYLENOL) tablet 650 mg, 650 mg, Oral, Q6H PRN **OR** acetaminophen (TYLENOL) suppository 650 mg, 650 mg, Rectal, Q6H PRN, Mary Oneal MD    dextrose 5 % solution, 100 mL/hr, Intravenous, PRN, Mary Oneal MD    dextrose 50 % IV solution, 12.5 g, Intravenous, PRN, Mary Oneal MD    glucagon (rDNA) injection 1 mg, 1 mg, Intramuscular, PRN, Mary Oneal MD    glucose (GLUTOSE) 40 % oral gel 15 g, 15 g, Oral, PRN, Mary Oneal MD    amiodarone (CORDARONE) tablet 200 mg, 200 mg, Oral, BID, Mary Oneal MD, 200 mg at 03/27/21 7058    aspirin EC tablet 81 mg, 81 mg, Oral, Daily, Mary Oneal MD, 81 mg at 03/27/21 7570    atorvastatin (LIPITOR) tablet 20 mg, 20 mg, Oral, Daily, Mary Oneal MD, 20 mg at 03/27/21 4073    bisacodyl (DULCOLAX) EC tablet 5 mg, 5 mg, Oral, Daily PRN, Mary Oneal MD, 5 mg at 03/26/21 0753    calcium carbonate (TUMS) chewable tablet 500 mg, 500 mg, Oral, TID PRN, Mary Oneal MD    ferrous sulfate (IRON 325) tablet 325 mg, 325 mg, Oral, BID Mary MD, 325 mg at 43/16/22 5571    folic acid (FOLVITE) tablet 1 mg, 1 mg, Oral, Daily, Mary Oneal MD, 1 mg at 03/27/21 0852    guaiFENesin (ROBITUSSIN) 100 MG/5ML liquid 200 mg, 200 mg, Oral, Q4H PRN, Mary Oneal MD    insulin lispro (HUMALOG) injection vial 0-6 Units, 0-6 Units, Subcutaneous, TID Mary RODRIGUEZ MD, 1 Units at 03/26/21 1708    insulin lispro (HUMALOG) injection vial 0-3 Units, 0-3 Units, Subcutaneous, Nightly, Mary Oneal MD, 1 Units at 03/26/21 2227    ipratropium-albuterol (DUONEB) nebulizer solution 1 ampule, 1 ampule, Inhalation, Q4H PRN, Mary Oneal MD, 1 ampule at 03/22/21 1005    metoprolol succinate (TOPROL XL) extended release tablet 100 mg, 100 mg, Oral, Daily, Opal Estrada MD, 100 mg at 03/27/21 0854    miconazole (MICOTIN) 2 % powder, , Topical, 4x Daily, Opal Estrada MD, Given at 03/27/21 0856    NIFEdipine (PROCARDIA XL) extended release tablet 60 mg, 60 mg, Oral, Daily, Opal Estrada MD, 60 mg at 03/27/21 0852    pantoprazole (PROTONIX) tablet 40 mg, 40 mg, Oral, Daily, Opal Estrada MD, 40 mg at 03/27/21 0852    tamsulosin (FLOMAX) capsule 0.4 mg, 0.4 mg, Oral, BID, Opal Estrada MD, 0.4 mg at 03/27/21 0755    vitamin D (ERGOCALCIFEROL) capsule 50,000 Units, 50,000 Units, Oral, Weekly, Opal Estrada MD, 50,000 Units at 03/22/21 0818    acetaminophen (TYLENOL) tablet 650 mg, 650 mg, Oral, Q4H PRN, Justa Jacob MD    polyethylene glycol (GLYCOLAX) packet 17 g, 17 g, Oral, Daily PRN, Justa Jacob MD    Allergies:  Eliquis [apixaban] and Promethazine hcl    Social History:   TOBACCO:   reports that he quit smoking about 17 years ago. He has never used smokeless tobacco.  ETOH:   reports current alcohol use of about 12.0 standard drinks of alcohol per week. Family History:       Problem Relation Age of Onset    Colon Cancer Father     Diabetes Brother     Colon Polyps Neg Hx     Liver Cancer Neg Hx     Liver Disease Neg Hx     Esophageal Cancer Neg Hx     Rectal Cancer Neg Hx     Stomach Cancer Neg Hx          PHYSICAL EXAM:  /60   Pulse 70   Temp 96.6 °F (35.9 °C) (Temporal)   Resp 20   Ht 6' (1.829 m)   Wt 237 lb 14.4 oz (107.9 kg)   SpO2 91%   BMI 32.27 kg/m²     Constitutional - well developed, well nourished.    Eyes - conjunctiva normal.   Ear, nose, throat - No scars, masses, or lesions over external nose or ears, no atrophy of tongue  Neck-symmetric, no masses noted, no jugular vein distension  Respiration- chest wall appears symmetric, good expansion,   normal effort without use of accessory muscles  Musculoskeletal - no significant wasting of muscles noted, no bony deformities  Extremities-no clubbing, cyanosis or edema  Skin - warm, dry, and intact. No rash, erythema, or pallor. Psychiatric - mood, affect, and behavior appear normal.      Neurological exam  Awake, alert, fluent oriented appropriate affect  Attention and concentration appear appropriate  Recent and remote memory appears unremarkable  Speech normal without dysarthria  No clear issues with language of fund of knowledge     Cranial Nerve Exam     CN III, IV,VI-EOMI, No nystagmus, conjugate eye movements, no ptosis    CN VII-no facial assymetry       Motor Exam  antigravity throughout upper and lower extremities bilaterally      Tremors- no tremors in hands or head noted     Gait  Not tested        Nursing/pcp notes, imaging,labs and vitals reviewed. PT,OT and/or speech notes reviewed    Lab Results   Component Value Date    WBC 5.2 03/25/2021    HGB 8.4 (L) 03/25/2021    HCT 28.1 (L) 03/25/2021    MCV 98.6 (H) 03/25/2021     (L) 03/25/2021     Lab Results   Component Value Date     03/25/2021    K 4.0 03/25/2021     03/25/2021    CO2 32 (H) 03/25/2021    BUN 44 (H) 03/25/2021    CREATININE 2.0 (H) 03/25/2021    GLUCOSE 159 (H) 03/25/2021    CALCIUM 8.4 (L) 03/25/2021    PROT 4.9 (L) 03/25/2021    LABALBU 3.0 (L) 03/25/2021    BILITOT 0.8 03/25/2021    ALKPHOS 112 03/25/2021    AST 10 03/25/2021    ALT 13 03/25/2021    LABGLOM 32 (A) 03/25/2021    GFRAA 39 (L) 03/25/2021     Lab Results   Component Value Date    INR 1.18 03/23/2021    INR 1.11 03/15/2021    INR 1.13 03/06/2021    PROTIME 14.9 (H) 03/23/2021    PROTIME 14.3 03/15/2021    PROTIME 14.4 03/06/2021 03/24/21 1430   Restrictions/Precautions   Restrictions/Precautions Surgical Protocols; Fall Risk   Pain Assessment   Pain Assessment 0-10   Pain Level 0   Oxygen Therapy   SpO2 90 %   O2 Device Nasal cannula   O2 Flow Rate (L/min) 3 L/min   Bed Mobility   Rolling Stand by assistance   Supine to Sit Stand by assistance   Scooting Independent   Transfers   Sit to Stand Minimal Assistance; Moderate Assistance   Stand to sit Contact guard assistance   Ambulation   Ambulation? Yes   Ambulation 1   Surface level tile   Device Rolling Walker   Other Apparatus O2;Wheelchair follow  (4 L)   Assistance Contact guard assistance   Quality of Gait reciprocal, fatigues quickly. Breaks taken throughout ambulation. Gait Deviations Slow Nae;Decreased step length   Distance 75'   Comments O2 dropped to 83 and carmina back to 90s with rest.   Ambulation 2   Surface - 2 level tile   Device 2 Rolling Walker   Other Apparatus 2 Wheelchair follow;O2  (4 L)   Assistance 2 Contact guard assistance   Quality of Gait 2 reciprocal, fatigues quickly. Breaks taken throughout ambulation. Gait Deviations Slow Nae;Decreased step length   Distance 30'   Comments O2 dropped to 90. Muscles fatigued. Exercises   Comments Hip flexion with light manual resistance 2x12; hip ABD with manual resistance 2x12; LAQ with manual resistance 1x12. Conditions Requiring Skilled Therapeutic Intervention   Assessment Patient required mod to min A sit to stand transfers and CGA with ambulation. Pt.'s O2 dropped to 83 but recovered to the 90s with rest.    Activity Tolerance   Activity Tolerance Patient Tolerated treatment well;Patient limited by fatigue   Safety Devices   Type of devices All fall risk precautions in place; Bed alarm in place;Call light within reach; Patient at risk for falls; Left in bed                    Objective    Balance  Standing Balance: Contact guard assistance  Functional Mobility  Functional - Mobility Device: Rolling Walker  Activity: Other;Retrieve items;Transport items  Functional Mobility Comments: Short distance x3 occasions  Transfers  Sit to stand: Contact guard assistance  Stand to sit: Contact guard assistance  Type of ROM/Therapeutic Exercise  Type of ROM/Therapeutic Exercise: Cane/Wand  Comment: 1#; 3 sets of 10 in all planes within pacemaker precautions    RECORD REVIEW: Previous medical records, medications were reviewed at today's visit    IMPRESSION:   1. Respiratory failure. CHF/COPD-O2/Duonebs PRN/tapering prednisone/Trilogy  2. Atrial fibrillation/S/P PPM-Amiodarone/Procardia XL/metorprolol  3. CAD/PVD-ASA/statin  4. Hyperlipidemia-on statin  5. Anemia-on iron. Status post 1 unit packed red blood cells with 3/12 and 3/20. Followed by hematology. 6. HTN-on meds monitor  7. GI-bowel regimen/PPI  8. BPH/history of bladder cancer-on meds  9. Vitamin D deficiency-on Vitamin D  10. DVT prophylaxis-SCDs  11. ETOH use-folic acid  12. Chronic kidney disease-monitor. Stable currently  13. Thrombocytopenia-monitor  14. Back pain/arthritis-Tylenol  15. PT/OT/Speech  16. Shortness of air-repeat chest x-ray and consult pulmonology. Thrombocytopenia [id number] following  17. Pulmonary edema. Better  appeciate hospitalist/pulmonology/h/o assistance. Watch renal fct. Now on Lasix.     Continue current treatment  Expected duration and frequency therapy: 180 minutes per day, 5 days per week      ELOS:3/29, Monday

## 2021-03-28 ENCOUNTER — IMMUNIZATION (OUTPATIENT)
Age: 81
End: 2021-03-28
Payer: MEDICARE

## 2021-03-28 LAB
GLUCOSE BLD-MCNC: 139 MG/DL (ref 70–99)
GLUCOSE BLD-MCNC: 165 MG/DL (ref 70–99)
GLUCOSE BLD-MCNC: 178 MG/DL (ref 70–99)
GLUCOSE BLD-MCNC: 230 MG/DL (ref 70–99)
PERFORMED ON: ABNORMAL

## 2021-03-28 PROCEDURE — 94640 AIRWAY INHALATION TREATMENT: CPT

## 2021-03-28 PROCEDURE — 91300 COVID-19, PFIZER VACCINE 30MCG/0.3ML DOSE: CPT | Performed by: FAMILY MEDICINE

## 2021-03-28 PROCEDURE — 0002A PR IMM ADMN SARSCOV2 30MCG/0.3ML DIL RECON 2ND DOSE: CPT | Performed by: FAMILY MEDICINE

## 2021-03-28 PROCEDURE — 6370000000 HC RX 637 (ALT 250 FOR IP): Performed by: INTERNAL MEDICINE

## 2021-03-28 PROCEDURE — 6370000000 HC RX 637 (ALT 250 FOR IP): Performed by: HOSPITALIST

## 2021-03-28 PROCEDURE — 82947 ASSAY GLUCOSE BLOOD QUANT: CPT

## 2021-03-28 PROCEDURE — 1180000000 HC REHAB R&B

## 2021-03-28 PROCEDURE — 6360000002 HC RX W HCPCS: Performed by: INTERNAL MEDICINE

## 2021-03-28 PROCEDURE — 2700000000 HC OXYGEN THERAPY PER DAY

## 2021-03-28 PROCEDURE — 6370000000 HC RX 637 (ALT 250 FOR IP): Performed by: PSYCHIATRY & NEUROLOGY

## 2021-03-28 RX ADMIN — BISACODYL 5 MG: 5 TABLET, COATED ORAL at 17:24

## 2021-03-28 RX ADMIN — PANTOPRAZOLE SODIUM 40 MG: 40 TABLET, DELAYED RELEASE ORAL at 08:49

## 2021-03-28 RX ADMIN — TAMSULOSIN HYDROCHLORIDE 0.4 MG: 0.4 CAPSULE ORAL at 20:29

## 2021-03-28 RX ADMIN — FERROUS SULFATE TAB 325 MG (65 MG ELEMENTAL FE) 325 MG: 325 (65 FE) TAB at 17:12

## 2021-03-28 RX ADMIN — BUDESONIDE 500 MCG: 0.5 SUSPENSION RESPIRATORY (INHALATION) at 19:45

## 2021-03-28 RX ADMIN — ATORVASTATIN CALCIUM 20 MG: 20 TABLET, FILM COATED ORAL at 08:49

## 2021-03-28 RX ADMIN — LINACLOTIDE 145 MCG: 145 CAPSULE, GELATIN COATED ORAL at 06:07

## 2021-03-28 RX ADMIN — ARFORMOTEROL TARTRATE 15 MCG: 15 SOLUTION RESPIRATORY (INHALATION) at 19:45

## 2021-03-28 RX ADMIN — BUDESONIDE 500 MCG: 0.5 SUSPENSION RESPIRATORY (INHALATION) at 06:24

## 2021-03-28 RX ADMIN — INSULIN LISPRO 1 UNITS: 100 INJECTION, SOLUTION INTRAVENOUS; SUBCUTANEOUS at 17:12

## 2021-03-28 RX ADMIN — FUROSEMIDE 40 MG: 40 TABLET ORAL at 08:47

## 2021-03-28 RX ADMIN — FERROUS SULFATE TAB 325 MG (65 MG ELEMENTAL FE) 325 MG: 325 (65 FE) TAB at 08:49

## 2021-03-28 RX ADMIN — TAMSULOSIN HYDROCHLORIDE 0.4 MG: 0.4 CAPSULE ORAL at 08:49

## 2021-03-28 RX ADMIN — NIFEDIPINE 60 MG: 60 TABLET, EXTENDED RELEASE ORAL at 08:49

## 2021-03-28 RX ADMIN — ARFORMOTEROL TARTRATE 15 MCG: 15 SOLUTION RESPIRATORY (INHALATION) at 06:24

## 2021-03-28 RX ADMIN — ASPIRIN 81 MG: 81 TABLET, FILM COATED ORAL at 08:47

## 2021-03-28 RX ADMIN — MICONAZOLE NITRATE: 2 POWDER TOPICAL at 08:51

## 2021-03-28 RX ADMIN — AMIODARONE HYDROCHLORIDE 200 MG: 200 TABLET ORAL at 08:49

## 2021-03-28 RX ADMIN — FUROSEMIDE 40 MG: 40 TABLET ORAL at 17:12

## 2021-03-28 RX ADMIN — INSULIN LISPRO 2 UNITS: 100 INJECTION, SOLUTION INTRAVENOUS; SUBCUTANEOUS at 12:23

## 2021-03-28 RX ADMIN — FOLIC ACID 1 MG: 1 TABLET ORAL at 08:49

## 2021-03-28 RX ADMIN — AMIODARONE HYDROCHLORIDE 200 MG: 200 TABLET ORAL at 20:29

## 2021-03-28 RX ADMIN — METOPROLOL SUCCINATE 100 MG: 50 TABLET, EXTENDED RELEASE ORAL at 08:47

## 2021-03-28 NOTE — PLAN OF CARE
Problem: Falls - Risk of:  Goal: Will remain free from falls  Description: Will remain free from falls  3/27/2021 2328 by Iraj Alexis RN  Outcome: Ongoing  3/27/2021 1539 by Jacob Munoz RN  Outcome: Ongoing  Goal: Absence of physical injury  Description: Absence of physical injury  3/27/2021 2328 by Iraj Alexis RN  Outcome: Ongoing  3/27/2021 1539 by Jacob Munoz RN  Outcome: Ongoing     Problem: IP BLADDER/VOIDING  Goal: LTG - patient will complete bladder elimination  3/27/2021 2328 by Iraj Alexis RN  Outcome: Ongoing  3/27/2021 1539 by Jacob Munoz RN  Outcome: Ongoing  Goal: LTG - Patient will utilize adaptive techniques/equipment to complete bladder elimination  3/27/2021 2328 by Iraj Alexis RN  Outcome: Ongoing  3/27/2021 1539 by Jacob Munoz RN  Outcome: Ongoing  Goal: LTG - patient will achieve acceptable level of continence  3/27/2021 2328 by Iraj Alexis RN  Outcome: Ongoing  3/27/2021 1539 by Jacob Munoz RN  Outcome: Ongoing  Goal: STG - Patient demonstrates ability to take care of indwelling catheter  3/27/2021 2328 by Iraj Alexis RN  Outcome: Ongoing  3/27/2021 1539 by Jacob Munoz RN  Outcome: Ongoing  Goal: STG - patient demonstrates self-cath technique using clean technique and care of the catheter  3/27/2021 2328 by Iraj Alexis RN  Outcome: Ongoing  3/27/2021 1539 by Jacob Munoz RN  Outcome: Ongoing  Goal: STG - Patient demonstrates no accidents  3/27/2021 2328 by Iraj Alexis RN  Outcome: Ongoing  3/27/2021 1539 by Jacob Munoz RN  Outcome: Ongoing  Goal: STG - Patient will state signs and symptoms of UTI  3/27/2021 2328 by Iraj Alexis RN  Outcome: Ongoing  3/27/2021 1539 by Jacob Munoz RN  Outcome: Ongoing  Goal: STG - patient will be able to empty bladder  3/27/2021 2328 by Iraj Alexis RN  Outcome: Ongoing  3/27/2021 1539 by Jacob Munoz RN  Outcome: Ongoing  Goal: STG - Patient demonstrates understanding of self catheterization schedule by completing task on time  3/27/2021 2328 by Bailee Dominique RN  Outcome: Ongoing  3/27/2021 1539 by Aisha Blunt RN  Outcome: Ongoing  Goal: STG - patient participates in bladder program by expressing need to void  3/27/2021 2328 by Bailee Dominique RN  Outcome: Ongoing  3/27/2021 1539 by Aisha Blunt RN  Outcome: Ongoing  Goal: STG - Patient verbalizes understanding of catheter care indwelling/intermittent  3/27/2021 2328 by Bailee Dominique RN  Outcome: Ongoing  3/27/2021 1539 by Aisha Blunt RN  Outcome: Ongoing  Goal: STG - patient participates in bladder program by adhering to implemented toileting schedule  3/27/2021 2328 by Bailee Dominique RN  Outcome: Ongoing  3/27/2021 1539 by Aisha Blunt RN  Outcome: Ongoing     Problem: IP BOWEL ELIMINATION  Goal: LTG - patient will utilize adaptive techniques/equipment to complete bowel elimination  3/27/2021 2328 by Bailee Dominique RN  Outcome: Ongoing  3/27/2021 1539 by Aisha Blunt RN  Outcome: Ongoing  Goal: LTG - patient will have regular and routine bowel evacuation  3/27/2021 2328 by Bailee Dominique RN  Outcome: Ongoing  3/27/2021 1539 by Aisha Blunt RN  Outcome: Ongoing  Goal: STG - patient will be accident free  3/27/2021 2328 by Bailee Dominique RN  Outcome: Ongoing  3/27/2021 1539 by Aisha Blunt RN  Outcome: Ongoing  Goal: STG - Patient demonstrates care and management of ostomy bag  3/27/2021 2328 by Bailee Dominique RN  Outcome: Ongoing  3/27/2021 1539 by Aisha Blunt RN  Outcome: Ongoing  Goal: STG - Patient participates in bowel management program  3/27/2021 2328 by Bailee Dominique RN  Outcome: Ongoing  3/27/2021 1539 by Aisha Blunt RN  Outcome: Ongoing  Goal: STG - patient maintains skin integrity  3/27/2021 2328 by Bailee Dominique RN  Outcome: Ongoing  3/27/2021 1539 by Aisha Blunt RN  Outcome: Ongoing  Goal: STG - Patient will verbalize signs and symptoms of constipation and how to prevent/alleviate  3/27/2021 2328 by Bailee Dominique, RN  Outcome: Ongoing  3/27/2021 1539 by Jada Nicolas RN  Outcome: Ongoing  Goal: STG - patient will be continent of stool  3/27/2021 2328 by Jeral Spurling, RN  Outcome: Ongoing  3/27/2021 1539 by Jada Nicolas RN  Outcome: Ongoing  Goal: STG - Patient completes digital stimulation technique  3/27/2021 2328 by Jeral Spurling, RN  Outcome: Ongoing  3/27/2021 1539 by Jada Nicolas RN  Outcome: Ongoing  Goal: STG - Patient verbalizes knowledge about relationship between diet, fluid intake, activity and medication on constipation  3/27/2021 2328 by Jeral Spurling, RN  Outcome: Ongoing  3/27/2021 1539 by Jada Nicolas RN  Outcome: Ongoing     Problem: IP BREATHING  Goal: LTG - patient will mobilize secretions and maintain airway  3/27/2021 2328 by Jeral Spurling, RN  Outcome: Ongoing  3/27/2021 1539 by Jada Nicolas RN  Outcome: Ongoing  Goal: LTG - Patient/caregiver will demonstrate/perform proper techniques to maintain patent airway  3/27/2021 2328 by Jeral Spurling, RN  Outcome: Ongoing  3/27/2021 1539 by Jada Nicolas RN  Outcome: Ongoing  Goal: LTG - patient/caregiver will demonstrate/perform improved or stable self care abilities within physical limitations  3/27/2021 2328 by Jeral Spurling, RN  Outcome: Ongoing  3/27/2021 1539 by Jada Nicolas RN  Outcome: Ongoing  Goal: STG - Patient/caregiver will maintain patent airway  3/27/2021 2328 by Jeral Spurling, RN  Outcome: Ongoing  3/27/2021 1539 by Jada Nicolas RN  Outcome: Ongoing  Goal: STG - respiratory rate and effort will be within normal limits for the patient  3/27/2021 2328 by Jeral Spurling, RN  Outcome: Ongoing  3/27/2021 1539 by Jada Nicolas RN  Outcome: Ongoing  Goal: STG - patient/caregiver will be able to verbalize oxygen safety precautions  3/27/2021 2328 by Jeral Spurling, RN  Outcome: Ongoing  3/27/2021 1539 by Jada Nicolas RN  Outcome: Ongoing  Goal: STG - Patient/caregiver demonstrates correct suctioning technique  3/27/2021 2328 by Jeral Spurling, RN  Outcome: Ongoing  3/27/2021 1539 by Harpal Sánchez RN  Outcome: Ongoing  Goal: STG - patient will utilize incentive spirometer  3/27/2021 2328 by Elio Rodriguez RN  Outcome: Ongoing  3/27/2021 1539 by Harpal Sánchez RN  Outcome: Ongoing  Goal: STG - Patient performs or directs assisted coughing  3/27/2021 2328 by Elio Rodriguez RN  Outcome: Ongoing  3/27/2021 1539 by Harpal Sánchez RN  Outcome: Ongoing  Goal: STG - patient can administer MDI's  3/27/2021 2328 by Elio Rodriguez, RN  Outcome: Ongoing  3/27/2021 1539 by Harpal Sánchez RN  Outcome: Ongoing  Goal: STG - Patient can utilize incentive spirometer  3/27/2021 2328 by Elio Rodriguez RN  Outcome: Ongoing  3/27/2021 1539 by Harpal Sánchez RN  Outcome: Ongoing  Goal: STG - family can complete suctioning  3/27/2021 2328 by Elio Rodriguez RN  Outcome: Ongoing  3/27/2021 1539 by Harpal Sánchez RN  Outcome: Ongoing     Problem: SAFETY  Goal: LTG - patient will adhere to hip precautions during ADL's and transfers  3/27/2021 2328 by Elio Rodriguez RN  Outcome: Ongoing  3/27/2021 1539 by Harpal Sánchez RN  Outcome: Ongoing  Goal: LTG - Patient will demonstrate safety requirements appropriate to situation/environment  3/27/2021 2328 by Elio Rodriguez RN  Outcome: Ongoing  3/27/2021 1539 by Harpal Sánchez RN  Outcome: Ongoing  Goal: LTG - patient will utilize safety techniques  3/27/2021 2328 by Elio Rodriguez RN  Outcome: Ongoing  3/27/2021 1539 by Harpal Sánchez RN  Outcome: Ongoing  Goal: STG - patient locks brakes on wheelchair  3/27/2021 2328 by Elio Rodriguez RN  Outcome: Ongoing  3/27/2021 1539 by Harpal Sánchez RN  Outcome: Ongoing  Goal: STG - Patient uses call light consistently to request assistance with transfers  3/27/2021 2328 by Eilo Rodriguez RN  Outcome: Ongoing  3/27/2021 1539 by Harpal Sánchez RN  Outcome: Ongoing  Goal: STG - patient uses gait belt during all transfers  3/27/2021 2328 by Elio Rodriguez RN  Outcome: Ongoing  3/27/2021 1539 by Harpal Sánchez RN  Outcome: Ongoing

## 2021-03-28 NOTE — PROGRESS NOTES
Patient given 0.3 ml covid vaccine IM to left deltoid muscle with site care and per protocol. No adverse reactions noted. Patient was educated on covid vaccine and voices understanding. Lot number [id number] expiration 7/2021. 2230 [address]  MRN [id number].

## 2021-03-29 VITALS
WEIGHT: 237 LBS | SYSTOLIC BLOOD PRESSURE: 111 MMHG | OXYGEN SATURATION: 91 % | HEIGHT: 72 IN | TEMPERATURE: 97.4 F | HEART RATE: 60 BPM | BODY MASS INDEX: 32.1 KG/M2 | DIASTOLIC BLOOD PRESSURE: 59 MMHG | RESPIRATION RATE: 18 BRPM

## 2021-03-29 LAB
ALBUMIN SERPL-MCNC: 2.9 G/DL (ref 3.5–5.2)
ALP BLD-CCNC: 129 U/L (ref 40–130)
ALT SERPL-CCNC: 13 U/L (ref 5–41)
ANION GAP SERPL CALCULATED.3IONS-SCNC: 8 MMOL/L (ref 7–19)
AST SERPL-CCNC: 14 U/L (ref 5–40)
BASOPHILS ABSOLUTE: 0 K/UL (ref 0–0.2)
BASOPHILS RELATIVE PERCENT: 0.3 % (ref 0–1)
BILIRUB SERPL-MCNC: 0.6 MG/DL (ref 0.2–1.2)
BUN BLDV-MCNC: 44 MG/DL (ref 8–23)
CALCIUM SERPL-MCNC: 7.8 MG/DL (ref 8.8–10.2)
CHLORIDE BLD-SCNC: 98 MMOL/L (ref 98–111)
CO2: 36 MMOL/L (ref 22–29)
CREAT SERPL-MCNC: 2.2 MG/DL (ref 0.5–1.2)
EOSINOPHILS ABSOLUTE: 0 K/UL (ref 0–0.6)
EOSINOPHILS RELATIVE PERCENT: 0 % (ref 0–5)
GFR AFRICAN AMERICAN: 35
GFR NON-AFRICAN AMERICAN: 29
GLUCOSE BLD-MCNC: 131 MG/DL (ref 74–109)
GLUCOSE BLD-MCNC: 134 MG/DL (ref 70–99)
GLUCOSE BLD-MCNC: 267 MG/DL (ref 70–99)
HCT VFR BLD CALC: 28 % (ref 42–52)
HEMOGLOBIN: 8.6 G/DL (ref 14–18)
IMMATURE GRANULOCYTES #: 0 K/UL
LYMPHOCYTES ABSOLUTE: 0.5 K/UL (ref 1.1–4.5)
LYMPHOCYTES RELATIVE PERCENT: 12.3 % (ref 20–40)
MAGNESIUM: 1.3 MG/DL (ref 1.6–2.4)
MCH RBC QN AUTO: 30.2 PG (ref 27–31)
MCHC RBC AUTO-ENTMCNC: 30.7 G/DL (ref 33–37)
MCV RBC AUTO: 98.2 FL (ref 80–94)
MONOCYTES ABSOLUTE: 0.3 K/UL (ref 0–0.9)
MONOCYTES RELATIVE PERCENT: 8.5 % (ref 0–10)
NEUTROPHILS ABSOLUTE: 2.9 K/UL (ref 1.5–7.5)
NEUTROPHILS RELATIVE PERCENT: 78.4 % (ref 50–65)
PDW BLD-RTO: 17.6 % (ref 11.5–14.5)
PERFORMED ON: ABNORMAL
PERFORMED ON: ABNORMAL
PLATELET # BLD: 88 K/UL (ref 130–400)
PMV BLD AUTO: 11.9 FL (ref 9.4–12.4)
POTASSIUM REFLEX MAGNESIUM: 3.5 MMOL/L (ref 3.5–5)
RBC # BLD: 2.85 M/UL (ref 4.7–6.1)
SODIUM BLD-SCNC: 142 MMOL/L (ref 136–145)
TOTAL PROTEIN: 4.7 G/DL (ref 6.6–8.7)
WBC # BLD: 3.8 K/UL (ref 4.8–10.8)

## 2021-03-29 PROCEDURE — 97530 THERAPEUTIC ACTIVITIES: CPT

## 2021-03-29 PROCEDURE — 99239 HOSP IP/OBS DSCHRG MGMT >30: CPT | Performed by: PSYCHIATRY & NEUROLOGY

## 2021-03-29 PROCEDURE — 83735 ASSAY OF MAGNESIUM: CPT

## 2021-03-29 PROCEDURE — 6370000000 HC RX 637 (ALT 250 FOR IP): Performed by: INTERNAL MEDICINE

## 2021-03-29 PROCEDURE — 36415 COLL VENOUS BLD VENIPUNCTURE: CPT

## 2021-03-29 PROCEDURE — 6360000002 HC RX W HCPCS: Performed by: INTERNAL MEDICINE

## 2021-03-29 PROCEDURE — 2700000000 HC OXYGEN THERAPY PER DAY

## 2021-03-29 PROCEDURE — 6370000000 HC RX 637 (ALT 250 FOR IP): Performed by: HOSPITALIST

## 2021-03-29 PROCEDURE — 85025 COMPLETE CBC W/AUTO DIFF WBC: CPT

## 2021-03-29 PROCEDURE — 94640 AIRWAY INHALATION TREATMENT: CPT

## 2021-03-29 PROCEDURE — 82947 ASSAY GLUCOSE BLOOD QUANT: CPT

## 2021-03-29 PROCEDURE — 80053 COMPREHEN METABOLIC PANEL: CPT

## 2021-03-29 PROCEDURE — 6370000000 HC RX 637 (ALT 250 FOR IP): Performed by: PSYCHIATRY & NEUROLOGY

## 2021-03-29 RX ORDER — POLYETHYLENE GLYCOL 3350 17 G/17G
17 POWDER, FOR SOLUTION ORAL DAILY PRN
Qty: 527 G | Refills: 1 | Status: SHIPPED | OUTPATIENT
Start: 2021-03-29 | End: 2021-04-28

## 2021-03-29 RX ORDER — NIFEDIPINE 60 MG/1
60 TABLET, FILM COATED, EXTENDED RELEASE ORAL DAILY
Qty: 30 TABLET | Refills: 3 | Status: SHIPPED | OUTPATIENT
Start: 2021-03-29

## 2021-03-29 RX ORDER — ASPIRIN 81 MG/1
81 TABLET ORAL DAILY
Qty: 30 TABLET | Refills: 3 | Status: SHIPPED | OUTPATIENT
Start: 2021-03-29

## 2021-03-29 RX ORDER — METOPROLOL SUCCINATE 100 MG/1
100 TABLET, EXTENDED RELEASE ORAL DAILY
Qty: 30 TABLET | Refills: 3 | Status: SHIPPED | OUTPATIENT
Start: 2021-03-29

## 2021-03-29 RX ORDER — GLIPIZIDE 5 MG/1
TABLET ORAL
Qty: 60 TABLET | Refills: 0 | Status: ON HOLD | OUTPATIENT
Start: 2021-03-29 | End: 2021-08-28 | Stop reason: HOSPADM

## 2021-03-29 RX ORDER — CALCIUM CARBONATE 200(500)MG
500 TABLET,CHEWABLE ORAL 3 TIMES DAILY PRN
Qty: 60 TABLET | Refills: 0 | Status: SHIPPED | OUTPATIENT
Start: 2021-03-29 | End: 2021-04-28

## 2021-03-29 RX ORDER — ATORVASTATIN CALCIUM 20 MG/1
20 TABLET, FILM COATED ORAL DAILY
Qty: 30 TABLET | Refills: 0 | Status: SHIPPED | OUTPATIENT
Start: 2021-03-29

## 2021-03-29 RX ORDER — FERROUS SULFATE 325(65) MG
325 TABLET ORAL 2 TIMES DAILY WITH MEALS
Qty: 30 TABLET | Refills: 3 | Status: SHIPPED | OUTPATIENT
Start: 2021-03-29

## 2021-03-29 RX ORDER — FOLIC ACID 1 MG/1
1 TABLET ORAL DAILY
Qty: 30 TABLET | Refills: 3 | Status: SHIPPED | OUTPATIENT
Start: 2021-03-29

## 2021-03-29 RX ORDER — TAMSULOSIN HYDROCHLORIDE 0.4 MG/1
0.4 CAPSULE ORAL 2 TIMES DAILY
Qty: 60 CAPSULE | Refills: 3 | Status: SHIPPED | OUTPATIENT
Start: 2021-03-29 | End: 2021-12-05 | Stop reason: SDUPTHER

## 2021-03-29 RX ORDER — ERGOCALCIFEROL 1.25 MG/1
50000 CAPSULE ORAL WEEKLY
Qty: 5 CAPSULE | Refills: 0 | Status: SHIPPED | OUTPATIENT
Start: 2021-03-29

## 2021-03-29 RX ORDER — PANTOPRAZOLE SODIUM 40 MG/1
40 TABLET, DELAYED RELEASE ORAL DAILY
Qty: 30 TABLET | Refills: 3 | Status: SHIPPED | OUTPATIENT
Start: 2021-03-29 | End: 2021-11-04 | Stop reason: ALTCHOICE

## 2021-03-29 RX ORDER — AMIODARONE HYDROCHLORIDE 200 MG/1
200 TABLET ORAL 2 TIMES DAILY
Qty: 60 TABLET | Refills: 0 | Status: SHIPPED | OUTPATIENT
Start: 2021-03-29 | End: 2021-04-30 | Stop reason: DRUGHIGH

## 2021-03-29 RX ORDER — FUROSEMIDE 40 MG/1
40 TABLET ORAL 2 TIMES DAILY
Qty: 60 TABLET | Refills: 3 | Status: SHIPPED | OUTPATIENT
Start: 2021-03-29 | End: 2021-04-16 | Stop reason: ALTCHOICE

## 2021-03-29 RX ORDER — GUAIFENESIN 100 MG/5ML
200 SYRUP ORAL EVERY 4 HOURS PRN
Qty: 118 ML | Refills: 0 | Status: SHIPPED | OUTPATIENT
Start: 2021-03-29 | End: 2021-11-04 | Stop reason: ALTCHOICE

## 2021-03-29 RX ADMIN — INSULIN LISPRO 3 UNITS: 100 INJECTION, SOLUTION INTRAVENOUS; SUBCUTANEOUS at 11:44

## 2021-03-29 RX ADMIN — ATORVASTATIN CALCIUM 20 MG: 20 TABLET, FILM COATED ORAL at 08:55

## 2021-03-29 RX ADMIN — FOLIC ACID 1 MG: 1 TABLET ORAL at 08:55

## 2021-03-29 RX ADMIN — FUROSEMIDE 40 MG: 40 TABLET ORAL at 08:56

## 2021-03-29 RX ADMIN — NIFEDIPINE 60 MG: 60 TABLET, EXTENDED RELEASE ORAL at 08:56

## 2021-03-29 RX ADMIN — AMIODARONE HYDROCHLORIDE 200 MG: 200 TABLET ORAL at 08:56

## 2021-03-29 RX ADMIN — FERROUS SULFATE TAB 325 MG (65 MG ELEMENTAL FE) 325 MG: 325 (65 FE) TAB at 08:56

## 2021-03-29 RX ADMIN — TAMSULOSIN HYDROCHLORIDE 0.4 MG: 0.4 CAPSULE ORAL at 08:56

## 2021-03-29 RX ADMIN — PANTOPRAZOLE SODIUM 40 MG: 40 TABLET, DELAYED RELEASE ORAL at 08:56

## 2021-03-29 RX ADMIN — ASPIRIN 81 MG: 81 TABLET, FILM COATED ORAL at 08:55

## 2021-03-29 RX ADMIN — MICONAZOLE NITRATE: 2 POWDER TOPICAL at 09:00

## 2021-03-29 RX ADMIN — ARFORMOTEROL TARTRATE 15 MCG: 15 SOLUTION RESPIRATORY (INHALATION) at 06:23

## 2021-03-29 RX ADMIN — LINACLOTIDE 145 MCG: 145 CAPSULE, GELATIN COATED ORAL at 05:54

## 2021-03-29 RX ADMIN — ERGOCALCIFEROL 50000 UNITS: 1.25 CAPSULE ORAL at 08:56

## 2021-03-29 RX ADMIN — BUDESONIDE 500 MCG: 0.5 SUSPENSION RESPIRATORY (INHALATION) at 06:24

## 2021-03-29 NOTE — DISCHARGE SUMMARY
Physical Therapy DISHCARGE Note  DATE:  3/29/2021  NAME:  Thuy Weller  :  1940  (79 y.o.,male)  MRN:  [id number]    HEIGHT:  Height: 6' (182.9 cm)  WEIGHT:  Weight: 237 lb (107.5 kg)    PATIENT DIAGNOSIS(ES):    Diagnosis: PACEMAKER PLACEMENT, CHRONIC HYPOXIA AND HYPERCAPNIA     Additional Pertinent Hx: HTN, HEART FAILURE, CKDIII, PVD, GERD, COPD, ANEMIA, T2DM, A-FIB   RESTRICTIONS/PRECAUTIONS:    Restrictions/Precautions  Restrictions/Precautions: Surgical Protocols, Fall Risk  Implants present? : Pacemaker  Position Activity Restriction  Other position/activity restrictions: PACEMAKER RESTRICTIONS TO LUE   OVERALL  ORIENTATION STATUS:     PAIN:  Pain Level: 0                    NEUROLOGICAL                          STRENGTH  Strength RLE  Strength RLE: WFL  Strength LLE  Strength LLE: WFL  ROM  AROM RLE (degrees)  RLE AROM: WFL  AROM LLE (degrees)  LLE AROM : WFL  POSTURE/BALANCE          ACTIVITY TOLERANCE  Activity Tolerance  Activity Tolerance: Patient limited by fatigue, Patient limited by endurance      BED MOBILITY  Bed Mobility  Bridging: Stand by assistance  Rolling: Stand by assistance  Supine to Sit: Stand by assistance  Sit to Supine: Stand by assistance  Scooting: Independent  Comment: Utilizes triplanar method. TRANSFERS  Sit to Stand: Stand by assistance      Bed to Chair: Stand by assistance  Car Transfer: Stand by assistance     WHEELCHAIR PROPULSION 1  Propulsion 1  Propulsion: Manual  Level: Level Tile  Method: CARLITO HERNÁNDEZ  Level of Assistance: Independent  Description/ Details: 150'  Distance: Pt propelled W/C well. WHEELCHAIR PROPULSION 2     AMBULATION  Ambulation 2  Surface - 2: level tile  Device 2: Rolling Walker  Other Apparatus 2: O2, Wheelchair follow(3)  Assistance 2: Contact guard assistance  Quality of Gait 2: reciprocal, fatigues quickly. Breaks taken throughout ambulation.   Gait Deviations: Slow Nae, Decreased step length  Distance: 72'  Comments: O2 dropped to 84 but recovered to 90s with rest.  STAIRS  Stairs  # Steps : 4  Stairs Height: 4\"  Rails: Bilateral  Device: No Device  Assistance: Modified independent   Comment: Fatigue set in quickly. O2 dropped to low 80s but recovered to 90s with rest.     GOALS:  Short term goals  Time Frame for Short term goals: 1 WEEK   Short term goal 1: SUPERVISION BED MOBILITY   Short term goal 2: SUPEVISION STS TF W/ RW   Short term goal 3: CGA 50 FT AMBULATION WITH RW   Short term goal 4: CGA CAR TF WITH RW   Short term goal 5: SUPERVISION HEP     Long term goals  Time Frame for Long term goals : 2-3 WEEKS   Long term goal 1: INDEPENDENT BED MOBILITY   Long term goal 2: INDEPENDENT STS TF   Long term goal 3: SUPERVISION 100 FT AMBULATION RW FOR SAFETY DUE TO DECREASED CV ENDURANCE   Long term goal 4: INDEPENDENT STAIR NEGOTIATION 4 STEPS  Long term goal 5: INDEPENDENT HEP  HOME LIVING:     Type of Home: House  Home Layout: One level  Home Access: Stairs to enter with rails  Entrance Stairs - Number of Steps: 2 steps from garage  Entrance Stairs - Rails: Both  ASSESSMENT (IMPAIRMENTS/BARRIERS): Body structures, Functions, Activity limitations: Decreased functional mobility , Decreased ADL status, Decreased endurance, Decreased posture  Assessment: Pt's O2 continues to drop into the 80's when amb very short distances this afternoon, but recovers to 93% after 3-5 minutes. Pt performed Car Tf well w/ only minor cues. Pt improved to Independent w/ W/C propulsion. Pt tolerated sitting ther ex well.   Activity Tolerance: Patient limited by fatigue, Patient limited by endurance  Specific instructions for Next Treatment: ASSESS AMBULATION, CAR TF AND BEGIN LE STRENGTHENING   PLAN:  Plan  Times per week: 5-6  Times per day: (1-2)  Plan weeks: 2-3  Specific instructions for Next Treatment: ASSESS AMBULATION, CAR TF AND BEGIN LE STRENGTHENING   Current Treatment Recommendations: Strengthening, ROM, Balance Training, Functional Mobility Training, Transfer Training, Endurance Training, Wheelchair Mobility Training, Gait Training, Stair training, Home Exercise Program, Safety Education & Training, Patient/Caregiver Education & Training  Discharge Recommendations: Continue to assess pending progress  PATIENT REQUIRES AND IS REASONABLY EXPECTED TO ACTIVELY PARTICIPATE IN AT LEAST 3 HOURS OF INTENSIVE THERAPY PER DAY AT LEAST 5 DAYS PER WEEK, AND BE EXPECTED TO MAKE MEASURABLE IMPROVEMENT THAT WILL BE OF PRACTICAL VALUE TO IMPROVE THE PATIENT'S FUNCTIONAL CAPACITY OR ADAPTATION TO IMPAIRMENTS.    PATIENT GOAL FOR REHAB:  RETURN TO PRIOR LEVEL OF FUNCTION       IRF/RHONDA  Roll Left and Right  Assistance Needed: Supervision or touching assistance  CARE Score: 4  Discharge Goal: Independent    Sit to Lying  Assistance Needed: Supervision or touching assistance  CARE Score: 4  Discharge Goal: Independent    Lying to Sitting on Side of Bed  Assistance Needed: Supervision or touching assistance  CARE Score: 4  Discharge Goal: Independent    Sit to Stand  Assistance Needed: Supervision or touching assistance  CARE Score: 4  Discharge Goal: Independent    Chair/Bed-to-Chair Transfer  Assistance Needed: Supervision or touching assistance  CARE Score: 4  Discharge Goal: Independent    Car Transfer  Assistance Needed: Supervision or touching assistance  Reason if not Attempted: Not attempted due to medical condition or safety concerns  CARE Score: 4  Discharge Goal: Independent    Walk 10 Feet  Assistance Needed: Supervision or touching assistance  Reason if not Attempted: Not attempted due to medical condition or safety concerns  CARE Score: 4  Discharge Goal: Independent    Walk 50 Feet with Two Turns  Assistance Needed: Supervision or touching assistance  Reason if not Attempted: Not attempted due to medical condition or safety concerns  CARE Score: 4  Discharge Goal: Independent    Walk 150 Feet  Reason if not Attempted: Not attempted due to medical condition or safety

## 2021-03-29 NOTE — CARE COORDINATION
Ms. Kami Ovlera arrived to transport Mr. Kami Olvera home- very upset, fearful to take him home. When she expressed concerned, he rebuffed her. Long conversation with Nurse manager Salvatore Mohan, myself and Mr Kami Olvera about his condition and that he is not ready for home-he is adamant he will go home-to the point of saying \"give me whatever legal thing is needed, I can't stay in that bed another night\". Being alert and oriented, he has right to discharge, counseled Ms. Kami Olvera at length with Salvatore Mohan that she could refuse to take him home, he could seek other transportation home. She went back to the room- did not tell him she would not take him home, agrees to try but continues to express her fears about his condition, fear of falls. He states he will agree to go to [facility] if he is unable to make it at home.

## 2021-03-29 NOTE — DISCHARGE SUMMARY
Neurology Discharge Summary     Patient Identification:  Cr Quinones is a 79 y.o. male. :  1940  Admit Date:  3/15/2021  Discharge date : 3/29/21   Attending Provider: Jody Silvestre MD     Account Number: [de-identified]                                   Admission Diagnoses:   Acute respiratory failure ([facility].) [J96.00]    Discharge Diagnoses:  Principal Problem:    [facility] on [facility] ([facility]  Active Problems:    Bilateral carotid artery stenosis    Atherosclerosis of native artery of both lower extremities with intermittent claudication (HCC)    Primary osteoarthritis of left knee    Arthritis of knee    Essential hypertension    Pure hypercholesterolemia    Iron deficiency anemia    GERD (gastroesophageal reflux disease)    CHF (congestive heart failure) (HCC)    Thrombocytopenia (HCC)    History of bladder cancer    Acute respiratory failure (HCC)    Chronic midline low back pain without sciatica    Chronic kidney disease    Atrial fibrillation (HCC)    Vitamin D deficiency    Anemia    Alcohol use    Pacemaker    Class 1 obesity in adult  Resolved Problems:    * No resolved hospital problems. *      Discharge Medications:    Current Discharge Medication List           Details   amiodarone (CORDARONE) 200 MG tablet Take 1 tablet by mouth 2 times daily  Qty: 60 tablet, Refills: 0      miconazole (MICOTIN) 2 % powder Apply topically 2 times daily.   Qty: 45 g, Refills: 1      furosemide (LASIX) 40 MG tablet Take 1 tablet by mouth 2 times daily  Qty: 60 tablet, Refills: 3      ferrous sulfate (IRON 325) 325 (65 Fe) MG tablet Take 1 tablet by mouth 2 times daily (with meals)  Qty: 30 tablet, Refills: 3      folic acid (FOLVITE) 1 MG tablet Take 1 tablet by mouth daily  Qty: 30 tablet, Refills: 3      bisacodyl (DULCOLAX) 5 MG EC tablet Take 1 tablet by mouth daily as needed for Constipation  Qty: 30 tablet, Refills: 0      polyethylene glycol (GLYCOLAX) 17 g packet Take 17 g by mouth daily patient at 1 point. Because of his worsening breathing difficulties it was felt that he had pulmonary edema and Lasix was added. Renal function is stable. He is on continuous oxygen. He had 1 unit of packed red blood cells on 3/12 and again on 3/20.   Disposition upon discharge-improved        Discharge Instructions     Patient Instructions:   Home  Therapy orders: PT and OT   Discharge lab work: none  Code status: Full Code   Activity: activity as tolerated  Diet: DIET CARDIAC; Daily Fluid Restriction: 1500 ml    Wound Care: as directed  Equipment: as per therapy      Taylor Wilkins MD    At least 35 minutes were spent in discharging the patient

## 2021-03-29 NOTE — PLAN OF CARE
Problem: Falls - Risk of:  Goal: Will remain free from falls  Description: Will remain free from falls  3/29/2021 0056 by Laura Perez RN  Outcome: Ongoing  3/28/2021 1417 by Syd Knowles RN  Outcome: Ongoing  Goal: Absence of physical injury  Description: Absence of physical injury  3/29/2021 0056 by Laura Perez RN  Outcome: Ongoing  3/28/2021 1417 by Syd Knowles RN  Outcome: Ongoing     Problem: IP BLADDER/VOIDING  Goal: LTG - patient will complete bladder elimination  3/29/2021 0056 by Laura Perez RN  Outcome: Ongoing  3/28/2021 1417 by Syd Knowles RN  Outcome: Ongoing  Goal: LTG - Patient will utilize adaptive techniques/equipment to complete bladder elimination  3/29/2021 0056 by Laura Perez RN  Outcome: Ongoing  3/28/2021 1417 by Syd Knowles RN  Outcome: Ongoing  Goal: LTG - patient will achieve acceptable level of continence  3/29/2021 0056 by Laura Perez RN  Outcome: Ongoing  3/28/2021 1417 by Syd Knowles RN  Outcome: Ongoing  Goal: STG - Patient demonstrates ability to take care of indwelling catheter  3/29/2021 0056 by Laura Perez RN  Outcome: Ongoing  3/28/2021 1417 by Syd Knowles RN  Outcome: Ongoing  Goal: STG - patient demonstrates self-cath technique using clean technique and care of the catheter  3/29/2021 0056 by aLura Perez RN  Outcome: Ongoing  3/28/2021 1417 by Syd Knowles RN  Outcome: Ongoing  Goal: STG - Patient demonstrates no accidents  3/29/2021 0056 by Laura Perez RN  Outcome: Ongoing  3/28/2021 1417 by Syd Knowles RN  Outcome: Ongoing  Goal: STG - Patient will state signs and symptoms of UTI  3/29/2021 0056 by Laura Perez RN  Outcome: Ongoing  3/28/2021 1417 by Syd Knowles RN  Outcome: Ongoing  Goal: STG - patient will be able to empty bladder  3/29/2021 0056 by Laura Perez RN  Outcome: Ongoing  3/28/2021 1417 by Syd Knowles RN  Outcome: Ongoing  Goal: STG - Patient demonstrates understanding of self catheterization schedule by completing task on time  3/29/2021 0056 by Elisha Roman RN  Outcome: Ongoing  3/28/2021 1417 by Meagan Andres RN  Outcome: Ongoing  Goal: STG - patient participates in bladder program by expressing need to void  3/29/2021 0056 by Elisha Roman RN  Outcome: Ongoing  3/28/2021 1417 by Meagan Andres RN  Outcome: Ongoing  Goal: STG - Patient verbalizes understanding of catheter care indwelling/intermittent  3/29/2021 0056 by Elisha Roman RN  Outcome: Ongoing  3/28/2021 1417 by Meagan Andres RN  Outcome: Ongoing  Goal: STG - patient participates in bladder program by adhering to implemented toileting schedule  3/29/2021 0056 by Elisha Roman RN  Outcome: Ongoing  3/28/2021 1417 by Meagan Andres RN  Outcome: Ongoing     Problem: IP BOWEL ELIMINATION  Goal: LTG - patient will utilize adaptive techniques/equipment to complete bowel elimination  3/29/2021 0056 by Elisha Roman RN  Outcome: Ongoing  3/28/2021 1417 by Meagan Andres RN  Outcome: Ongoing  Goal: LTG - patient will have regular and routine bowel evacuation  3/29/2021 0056 by Elisha Roman RN  Outcome: Ongoing  3/28/2021 1417 by Meagan Andres RN  Outcome: Ongoing  Goal: STG - patient will be accident free  3/29/2021 0056 by Elisha Roman RN  Outcome: Ongoing  3/28/2021 1417 by Meagan Andres RN  Outcome: Ongoing  Goal: STG - Patient demonstrates care and management of ostomy bag  3/29/2021 0056 by Elisha Roman RN  Outcome: Ongoing  3/28/2021 1417 by Meagan Andres RN  Outcome: Ongoing  Goal: STG - Patient participates in bowel management program  3/29/2021 0056 by Elisha Roman RN  Outcome: Ongoing  3/28/2021 1417 by Meagan Andres RN  Outcome: Ongoing  Goal: STG - patient maintains skin integrity  3/29/2021 0056 by Elisha Roman RN  Outcome: Ongoing  3/28/2021 1417 by Meagan Andres RN  Outcome: Ongoing  Goal: STG - Patient will verbalize signs and symptoms of constipation and how to

## 2021-03-29 NOTE — PROGRESS NOTES
modified independence  Long term goal 3: pt will complete overall bathing with modified independence  Long term goal 4: pt will complete simple ambulatory home making task with modified independence  Long term goal 5: pt will complete HEP with independence  Long term goals 6: pt verbalize DME for home       Therapy Time   Individual Concurrent Group Co-treatment   Time In 0815         Time Out 0845         Minutes 30         Timed Code Treatment Minutes: 75 Christiano Acevedo, OT

## 2021-03-29 NOTE — CARE COORDINATION
MrRenetta Duggan states his wife is coming to get him after some DME is being delivered to the home, expecting between 6 and 15 and they have 2 male friends planning to assist him inside. Portable oxygen is being delivered there this am, and the respiratory therapist from [address] [place] [address] and will contact them for further instruction at home with the [employer]. Referral made to [facility], who has served him previously.   Again, back up plan- [facility]

## 2021-03-30 ENCOUNTER — TELEPHONE (OUTPATIENT)
Dept: CARDIOLOGY CLINIC | Age: 81
End: 2021-03-30

## 2021-03-30 NOTE — DISCHARGE SUMMARY
Occupational Therapy Discharge Summary         Date: 3/30/2021  Patient Name: Miles Savage        MRN: [id number]    : 1940  (79 y.o.)  Gender: male      Diagnosis: PACEMAKER PLACEMENT, CHRONIC HYPOXIA AND HYPERCAPNIA   Restrictions/Precautions  Restrictions/Precautions: Surgical Protocols, Fall Risk  Implants present? : Pacemaker      Discharge Date: 3/29/2021    UE Functioning:  BUE AROM WFL, pacemaker precautions on LUE    Home Management:  Functional Mobility  Functional - Mobility Device: Rolling Walker  Activity: To/from bathroom  Assist Level: Contact guard assistance  Functional Mobility Comments: Short distance x3 occasions    Adaptive Equipment/DME Status:  Home Equipment: 4 wheeled walker, Oxygen, Grab bars(O2 at home prn---very rarely)  20\" wheelchair, with wheelchair cushion, Rolling walker, Bedside commode     Pain Assessment:  Pain Level: 0       Remaining Problems:  Decreased functional mobility ; Decreased endurance; Decreased strength; Decreased ROM; Decreased balance; Decreased ADL status;  Decreased high-level IADLs    STGs:  Short term goals  Time Frame for Short term goals: 1 week  Short term goal 1: pt will complete LB dressing with AE prn with CGA  Short term goal 2: pt will complete overall toileting with CGA  Short term goal 3: pt will complete simple ambulatory home making task with CGA while maintaining pacemaker precautions  Short term goal 4: pt will complete overall bathing with CGA  Short term goal 5: pt will complete 1-2 handed static standing act for 2 mins with supervision  Short term goal 6: pt will complete HEP x 5 occasions within range of pacemaker precautions to improve strength and endurance for ADLs  MET 1/6 Short Term Goals    LTGs:  Long term goals  Time Frame for Long term goals : 2 weeks  Long term goal 1: pt will complete overall dressing with modified independence  Long term goal 2: pt will complete overall toileting with modified independence  Long term goal 3: pt will complete overall bathing with modified independence  Long term goal 4: pt will complete simple ambulatory home making task with modified independence  Long term goal 5: pt will complete HEP with independence  Long term goals 6: pt verbalize DME for home  MET 2/6 Long Term Goals    Discharge Setting and Recommendations:  Home with Home Health OT     Discharge Care Scores  Eating: CARE Score: 5  Oral Hygiene: CARE Score: 6  Toileting: CARE Score: 3  Shower/Bathe: CARE Score: 3  Upper Body Dressing: CARE Score: 4  Lower Body Dressing: CARE Score: 3  Footwear: CARE Score: 2  Toilet Transfers: CARE Score: 3  Picking Up Object:  CARE Score: 3    Electronically signed by Taryn Rey OT on 3/30/21 at 8:59 AM CDT

## 2021-03-30 NOTE — TELEPHONE ENCOUNTER
This patient was in the hospital here for several weeks they should have been removed. I do not know why they did not do this. I will notify Dr. Carolyn James and enter [facility]. Also tried to call [facility] back no answer left message.

## 2021-04-01 NOTE — TELEPHONE ENCOUNTER
Nilam with home health called inquiring about an order to remove staples. To her knowledge staples are still in place. She said someone is going out today to see him but they don't have an order.  She can be reached at [phone]

## 2021-04-01 NOTE — TELEPHONE ENCOUNTER
Spoke with Latoya and told her that same as Concepcion Clark to remove lito. She voiced understanding and isn't sure why Concepcion Clark did not note this when I talked with her Tuesday.

## 2021-04-02 ENCOUNTER — OFFICE VISIT (OUTPATIENT)
Dept: PALLATIVE CARE | Age: 81
End: 2021-04-02
Payer: MEDICARE

## 2021-04-02 VITALS
DIASTOLIC BLOOD PRESSURE: 58 MMHG | SYSTOLIC BLOOD PRESSURE: 104 MMHG | TEMPERATURE: 98.2 F | OXYGEN SATURATION: 95 % | RESPIRATION RATE: 18 BRPM | HEART RATE: 65 BPM

## 2021-04-02 DIAGNOSIS — Z95.0 HISTORY OF PACEMAKER: ICD-10-CM

## 2021-04-02 DIAGNOSIS — J44.9 CHRONIC OBSTRUCTIVE PULMONARY DISEASE, UNSPECIFIED COPD TYPE (HCC): ICD-10-CM

## 2021-04-02 DIAGNOSIS — G89.29 CHRONIC MIDLINE LOW BACK PAIN WITHOUT SCIATICA: ICD-10-CM

## 2021-04-02 DIAGNOSIS — R60.0 BILATERAL LOWER EXTREMITY EDEMA: ICD-10-CM

## 2021-04-02 DIAGNOSIS — E11.8 TYPE 2 DIABETES MELLITUS WITH COMPLICATION, WITHOUT LONG-TERM CURRENT USE OF INSULIN (HCC): ICD-10-CM

## 2021-04-02 DIAGNOSIS — Z51.5 ENCOUNTER FOR PALLIATIVE CARE: ICD-10-CM

## 2021-04-02 DIAGNOSIS — J96.12 CHRONIC RESPIRATORY FAILURE WITH HYPOXIA AND HYPERCAPNIA (HCC): ICD-10-CM

## 2021-04-02 DIAGNOSIS — M54.50 CHRONIC MIDLINE LOW BACK PAIN WITHOUT SCIATICA: ICD-10-CM

## 2021-04-02 DIAGNOSIS — J96.11 CHRONIC RESPIRATORY FAILURE WITH HYPOXIA AND HYPERCAPNIA (HCC): ICD-10-CM

## 2021-04-02 DIAGNOSIS — R53.81 PHYSICAL DECONDITIONING: Primary | ICD-10-CM

## 2021-04-02 PROCEDURE — 4040F PNEUMOC VAC/ADMIN/RCVD: CPT | Performed by: NURSE PRACTITIONER

## 2021-04-02 PROCEDURE — 1123F ACP DISCUSS/DSCN MKR DOCD: CPT | Performed by: NURSE PRACTITIONER

## 2021-04-02 PROCEDURE — 1036F TOBACCO NON-USER: CPT | Performed by: NURSE PRACTITIONER

## 2021-04-02 PROCEDURE — 99345 HOME/RES VST NEW HIGH MDM 75: CPT | Performed by: NURSE PRACTITIONER

## 2021-04-02 PROCEDURE — G8417 CALC BMI ABV UP PARAM F/U: HCPCS | Performed by: NURSE PRACTITIONER

## 2021-04-03 ASSESSMENT — ENCOUNTER SYMPTOMS
DIARRHEA: 1
SHORTNESS OF BREATH: 1
BACK PAIN: 1
COUGH: 0
TROUBLE SWALLOWING: 0
WHEEZING: 0
CHEST TIGHTNESS: 0
VOMITING: 0
NAUSEA: 0

## 2021-04-03 NOTE — PROGRESS NOTES
[facility] Based Palliative Care  Initial Consultation Note      Patient Name:  Dalila Malagon  Medical Record Number:  [id number]  YOB: 1940    Date of Visit: 4/2/2021  Location of Visit:  [x]  Home    []  Other:      Referring Provider: [facility] inpatient case management/Jose Rodriguez MD (hospitalist)  Primary Care Provider: Muna Kearney MD  Additional Care Team Members:   Dr. Tu Estevez - pulmonology  Dr. Stacie Hamilton - cardiology  Dr. Rushie Sandhoff - urology  Dr. Osvaldo Adkins - nephrology  Harp[facility] Deal [facility]    Reason for Consult:  [x]  Goals of care     [x]  Symptom Management    []  ACP   [x]  Family Support      History obtained from:  patient, spouse, electronic medical record    PALLIATIVE DIAGNOSES AND ORDERS/RECOMMENDATIONS/PLAN:     Physical deconditioning  Continue participation with home physical therapy. Ambulate in home with Rollator for exercise several times a day. Bilateral lower extremity edema  Elevate lower extremities when possible. Weigh daily. Continue Lasix as prescribed. Type 2 diabetes mellitus with complication, without long-term current use of insulin ([address]  Follow up with Dr. Bijan Grace. Bedtime snack recommended due to recent morning blood sugar 48 (isolated reading) Today it was 116. Chronic midline low back pain without sciatica    Chronic obstructive pulmonary disease, unspecified COPD type ([address]  Chronic respiratory failure with hypoxia and hypercapnia ([address]  Follow up with pulmonology as scheduled. Continue Trilogy and O2. He breathes very shallowly. Use incentive spirometer taking 10 deep breaths every few hours while awake. Recent Pacemaker Placement  Follow up with cardiology as scheduled. I will check with cardiology and asked them to give some guidance regarding his remote monitoring system. Encounter for palliative care  ACP documents on file. Reports wanting DNR.   If EMS/DNR has not been clinical evidence of infectious pneumonia and most likely explanation of his current respiratory issues is due to cardiac failure. He suggest also by optimizing his cardiac status and repeating swallow evaluation to rule out aspiration. Had episodes of NSVT. Required amiodarone drip. Evaluated by cardiology who recommended diagnostic cardiac catheterization and possibly dual-chamber pacemaker placement. 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 He developed a hematoma left forearm after the procedure. Anemic, hemoglobin down to 7.7. Pulmonology noted due to his acute on chronic hypoxic hypercapnic respiratory failure that continued management with oxygen support and noninvasive ventilation while sleeping to assure adequate gas exchange would be beneficial/trilogy. He underwent dual-chamber pacemaker placement on 3/13/2021 due to sinus node dysfunction with prolonged pauses up to 5.7 seconds. 3/15/2021- 3/29/2021  Admitted to inpatient rehab for strengthening prior to discharge home. Was noted to have worsening thrombocytopenia and hematology consulted. Thrombocytopenia is chronic, dating to at least 6/2017. Platelets have ranged from 85,000 (11/2018) to a high of 228,000 (1/17/2021). Intermittent anemia also dates to 6/2017. Started on folic acid 1mg, ferrous sulfate 325mg bid, received Venofer 100mg IV daily x 3 days. Received transfusion of PRBC. GI consulted and recommended no procedure, empiric therapy with PPI. Was evaluated by SLP and no s/s of aspiration regular diet, thin liquids recommended. He had increased dyspnea and improved with Lasix. CXR showed fluid overload (pulm edema/pleural effusions), proBNP was normal. Daily oral Lasix initiated. Discharged home with spouse and home health. HPI AND VISIT SUMMARY:     I saw Mr. Long Joaquin in his home.   His wife was present and participated in the visit. I introduced myself and the role of supportive care. We discussed his recent hospitalizations. He expresses some frustration with lack of information received during his hospitalization. He reports knowing that he had recurrent pneumonia and some fluid issues. He states next thing he knows they were telling him he needed a pacemaker. There is some confusion about when his follow-up visits will be. He also expresses desire to eventually seek a second opinion may be at the [facility]. He knows he will need to become much stronger and be able to function more independently prior to making such a trip. He reports feeling much stronger over the last several days since he has been home. He does have home care and will be getting PT and OT. He is able to ambulate with his Rollator. Earlier today he took a shower and had his oxygen off for about an hour. Afterwards  his oxygen saturation was 93%. He would like to eventually be able to have the oxygen weaned  and not use it at all or maybe only use it at night. He reports being able to wear his Trilogy. He states is more comfortable than he thought it would be. His wife is his primary caregiver. She reports he is sleeping well. He denies any chest pain or palpitations. He was sent home from the hospital with Chava Camargo which he does not like he needs. He reports his bowels are moving without difficulty. He does take MiraLAX if needed. He is stopped taking the Linzess. He has had no problems obtaining his medications. Wife reports he was treated for yeast type rash on his buttocks and scrotal region while in the hospital and it is improving. He denies having any skin issues. Commended they keep this area clean, dry and continue to use his antifungal powders. He did complete an advanced directive while he was in the hospital recently. He also reports wanting to be a DNR.     Currently his goal is to gain strength and be able to function independently without the need for oxygen. He and his wife enjoy socializing with friends and going out to eat. He has not been noted to some these things due to the pandemic but as risk decreases he would like to be able to again go out and do things like he did previously. He recently received a [employer] box with a remote monitoring device for his pacemaker. He is uncertain what he should do with it. I will recheck cardiology and asked him to provide him some guidance. Also make sure he has the follow-up appointments that he needs.       ADVANCED CARE PLANNING:                                            Surrogate Decision Maker:   Advance Care Planning   Healthcare Decision Maker:    Primary Decision Maker: Eunice Pu - Child - [phone]    Durable Power of : no    Advanced Directives/Living Jung: yes    CLINICAL PAIN ASSESSMENT:     Score 1-10 (if verbal): 0    FUNCTIONAL ASSESSMENT:     Palliative Performance Scale: 50    HISTORY:     Past Medical History:        Diagnosis Date    Acute liver failure without hepatic coma 10/23/2018    Back pain     \"with tired legs as a result\"    Bladder cancer ([address] 12/19/2018    Blood circulation, collateral     Carotid arterial disease ([address]     recent surgery    CKD (chronic kidney disease), 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/2018    COPD with acute lower respiratory infection ([address] 2/24/2021    GERD (gastroesophageal reflux disease)     Hyperlipidemia     Hypertension     Hypertension     Palliative care patient 10/23/2018    Pneumonia due to infectious organism 11/06/2018    Primary osteoarthritis of left knee 10/14/2018    PVD [facility]peripheral vascular disease) ([facility]     Tremor     Tremor on Right side x 1-2 weeks per stepdaughter    Type 2 diabetes mellitus with complication, without long-term current use of insulin ([address] 1/21/2021       Past Surgical History:        Procedure Laterality Date    BACK SURGERY  CARDIAC CATHETERIZATION  03/23/2021    100% RCA    COLONOSCOPY  2007?  CYSTOSCOPY Bilateral 12/19/2018    CYSTOSCOPY, BIOSPY FULGURATION OF BLADDER TUMOR POSSIBLE TURBT, RETROGRADE PYELOGRAM performed by Tiesha Albarado MD at [facility] COLONOSCOPY FLX DX W/COLLJ SPEC WHEN PFRMD N/A 09/11/2017    Dr Mejia Mays internal hemorrhoids, diverticular disease-HP-No recall (age)   Yvonne Manual MI REVISE MEDIAN N/CARPAL TUNNEL SURG Left 07/18/2018    OPEN CARPAL TUNNEL RELEASE performed by Kaylin Cha MD at [address] Left 08/28/2018    LEFT CAROTID ENDARTERECTOMY WITH VEIN PATCH ANGIOPLASTY AND COMPLETION ANGIOGRAM performed by Placido Yin MD at [address] 10/15/2018    LEFT COMPLEX TOTAL KNEE ARTHROPLASTY performed by Kaylin Cha MD at [facility] VASCULAR SURGERY  04/21/2015    Willa BESS Ultrasound guided access of left common femoral artery. Aortogram.Diagnostic right lower extremity arteriogram.Radiologic supervision and interpretation.  VASCULAR SURGERY  01/13/2015    Willa Caruso M.D Atherectomy,angioplasty,and stenting of left superficial femoral artery.  VASCULAR SURGERY  03/11/2014    Willa Caruso M.D. Ultrasound-guided access of right common femoral artery. Aortogram.Left lower extremity arteriogram.Atherectomy and angioplasty of left superficial femoral artery. Radiologic supervision and interpretation.  VASCULAR SURGERY  01/18/2013    Willa BESS Aortogram.Multistation arteriogram right lower extremity. Laser atherectomy and angioplasty of right superficial femoral artery. Selective catheterization of right tibioperoneal trunk. Angioplasty of peroneal artery and tibioperoneal trunk.  VASCULAR SURGERY  10/30/2018    SJS. Ultrasound guided cannulation of right internal vein. Placement of right internal jugular vein tunneled dialysis catheter bard equistream xk 23cm tip MG capsule, Take 1 capsule by mouth 2 times daily, Disp: 60 capsule, Rfl: 3    pantoprazole (PROTONIX) 40 MG tablet, Take 1 tablet by mouth daily, Disp: 30 tablet, Rfl: 3    OXYGEN, Inhale 2 L into the lungs continuous, Disp: 1 Container, Rfl: 5    calcium carbonate (TUMS) 500 MG chewable tablet, Take 1 tablet by mouth 3 times daily as needed for Heartburn (Patient not taking: Reported on 4/2/2021), Disp: 60 tablet, Rfl: 0    guaiFENesin (ROBITUSSIN) 100 MG/5ML syrup, Take 10 mLs by mouth every 4 hours as needed for Cough (Patient not taking: Reported on 4/2/2021), Disp: 118 mL, Rfl: 0    bisacodyl (DULCOLAX) 5 MG EC tablet, Take 1 tablet by mouth daily as needed for Constipation (Patient not taking: Reported on 4/2/2021), Disp: 30 tablet, Rfl: 0    linaclotide (LINZESS) 145 MCG capsule, Take 1 capsule by mouth every morning (before breakfast) (Patient not taking: Reported on 4/2/2021), Disp: 30 capsule, Rfl: 0    vitamin D (ERGOCALCIFEROL) 1.25 MG (35242 UT) CAPS capsule, Take 1 capsule by mouth once a week, Disp: 5 capsule, Rfl: 0     Allergies:  Eliquis [apixaban] and Promethazine hcl    Review of Systems   Constitutional: Negative for chills and fever. HENT: Negative for trouble swallowing. Respiratory: Positive for shortness of breath. Negative for cough, chest tightness and wheezing. Cardiovascular: Positive for leg swelling. Negative for chest pain and palpitations. Gastrointestinal: Positive for diarrhea (stools a little loose the last couple of days). Negative for nausea and vomiting. Some reflux/heartburn occasionally   Genitourinary: Positive for frequency (due to diuretic). Negative for difficulty urinating. Musculoskeletal: Positive for back pain (low back- chronic). Neurological: Positive for weakness (generalized) and numbness (neuropathy feet). Psychiatric/Behavioral: Negative.         OBJECTIVE:     Vitals:    04/02/21 1610   BP: (!) 104/58   Pulse: 65   Resp: 18   Temp: 98.2 °F (36.8 °C)   SpO2: 95%     General appearance: alert and cooperative with exam, vital signs stable, well nourished, well developed, wearing O2 per nc  HEENT: atraumatic, sclera clear pupils and irises normal, external ears and nose are normal, lips normal.  Neck:  supple  Lungs: equal bilateral expansion, lungs clear, diminished bilaterally, poor effort/shallow breaths  Heart:  regular rate and rhythm, S1 S2 normal  Abdomen:  soft, non-tender, bowel sounds active  Extremities:  no cyanosis, clubbing, 1+ lower extremity and pedal edema, Left upper ext with resolving ecchymosis/hematoma. Neurologic: no focal neurologic deficits,alert and oriented   Psychiatric:  no recent or remote memory deficits, mood and affect appropriate  Skin: warm, dry, pacemaker incision well approximated, no s/s of infection    LAB DATA REVIEWED:      3/29/2021  4:24 AM - Mima Anderson Incoming Lab Results From [facility]    Component Value Ref Range & Units Status Collected Lab   Sodium 142  136 - 145 mmol/L Final 03/29/2021  3:27 AM [facility]   Potassium reflex Magnesium 3.5  3.5 - 5.0 mmol/L Final 03/29/2021  3:27 AM [facility]   Chloride 98  98 - 111 mmol/L Final 03/29/2021  3:27 AM [facility]   CO2 36High   22 - 29 mmol/L Final 03/29/2021  3:27 AM [facility]   Anion Gap 8  7 - 19 mmol/L Final 03/29/2021  3:27 AM [facility]   Glucose 131High   74 - 109 mg/dL Final 03/29/2021  3:27 AM [facility]   BUN 44High   8 - 23 mg/dL Final 03/29/2021  3:27 AM [facility]   CREATININE 2.2High   0.5 - 1.2 mg/dL Final 03/29/2021  3:27 AM [facility]   GFR Non- 29Abnormal   >60 Final 03/29/2021  3:27 AM [facility]   This calculation may be inaccurate for patients under the age of 25 years. For ages 25 and older, a GFR >60 mL/min/1.73m2 (not corrected for weight) is   valid for stable renal function. GFR  35Low   >59 Final 03/29/2021  3:27 AM [facility]   Chronic Kidney Disease: less than 60 ml/min/1.73 sq. m.         Kidney Failure: less than 15 ml/min/1.73 sq.m. Results valid for patients 18 years and older. Calcium 7.8Low   8.8 - 10.2 mg/dL Final 03/29/2021  3:27 AM [facility]   Total Protein 4.7Low   6.6 - 8.7 g/dL Final 03/29/2021  3:27 AM [facility]   Albumin 2.9Low   3.5 - 5.2 g/dL Final 03/29/2021  3:27 AM [facility]   Total Bilirubin 0.6  0.2 - 1.2 mg/dL Final 03/29/2021  3:27 AM [facility]   Alkaline Phosphatase 129  40 - 130 U/L Final 03/29/2021  3:27 AM [facility]   ALT 13  5 - 41 U/L Final 03/29/2021  3:27 AM [facility]   AST 14  5 - 40 U/L Final 03/29/2021  3:27 AM [facility]     3/29/2021  4:29 AM - Mima Anderson Incoming Lab Results From [facility]    Component Value Ref Range & Units Status Collected Lab   Magnesium 1.3Low   1.6 - 2.4 mg/dL Final 03/29/2021  3:27 AM [facility]   Testing Performed By    3/29/2021  3:43 AM - Mima Anderson Incoming Lab Results From [facility]    Component Value Ref Range & Units Status Collected Lab   WBC 3.8Low   4.8 - 10.8 K/uL Final 03/29/2021  3:27 AM [facility]   RBC 2.85Low   4.70 - 6.10 M/uL Final 03/29/2021  3:27 AM [facility]   Hemoglobin 8.6Low   14.0 - 18.0 g/dL Final 03/29/2021  3:27 AM [facility]   Hematocrit 28. 0Low   42.0 - 52.0 % Final 03/29/2021  3:27 AM [facility]   MCV 98. 2High   80.0 - 94.0 fL Final 03/29/2021  3:27 AM [facility]   MCH 30.2  27.0 - 31.0 pg Final 03/29/2021  3:27 AM [facility]   MCHC 30.7Low   33.0 - 37.0 g/dL Final 03/29/2021  3:27 AM [facility]   RDW 17.6High   11.5 - 14.5 % Final 03/29/2021  3:27 AM [facility]   Platelets 69ZZU   521 - 400 K/uL Final 03/29/2021  3:27

## 2021-04-05 ENCOUNTER — TELEPHONE (OUTPATIENT)
Dept: NEUROLOGY | Age: 81
End: 2021-04-05

## 2021-04-05 NOTE — TELEPHONE ENCOUNTER
Called and spoke with pt, he wanted to reschedule is HFU with Dr. Sam Ingram ([facility]). Pt said he is not able to travel to the office and that he will call back to reschedule when he is able to come in.

## 2021-04-13 ENCOUNTER — OFFICE VISIT (OUTPATIENT)
Dept: PULMONOLOGY | Age: 81
End: 2021-04-13
Payer: MEDICARE

## 2021-04-13 VITALS
HEART RATE: 60 BPM | SYSTOLIC BLOOD PRESSURE: 130 MMHG | WEIGHT: 233 LBS | RESPIRATION RATE: 16 BRPM | BODY MASS INDEX: 31.56 KG/M2 | OXYGEN SATURATION: 96 % | HEIGHT: 72 IN | DIASTOLIC BLOOD PRESSURE: 70 MMHG | TEMPERATURE: 97.5 F

## 2021-04-13 DIAGNOSIS — I50.32 CHRONIC DIASTOLIC CONGESTIVE HEART FAILURE (HCC): ICD-10-CM

## 2021-04-13 DIAGNOSIS — R06.02 SOB (SHORTNESS OF BREATH): ICD-10-CM

## 2021-04-13 DIAGNOSIS — J96.12 CHRONIC RESPIRATORY FAILURE WITH HYPOXIA AND HYPERCAPNIA (HCC): Primary | ICD-10-CM

## 2021-04-13 DIAGNOSIS — J96.11 CHRONIC RESPIRATORY FAILURE WITH HYPOXIA AND HYPERCAPNIA (HCC): Primary | ICD-10-CM

## 2021-04-13 DIAGNOSIS — G47.33 OSA TREATED WITH BIPAP: ICD-10-CM

## 2021-04-13 PROCEDURE — G8417 CALC BMI ABV UP PARAM F/U: HCPCS | Performed by: INTERNAL MEDICINE

## 2021-04-13 PROCEDURE — 99214 OFFICE O/P EST MOD 30 MIN: CPT | Performed by: INTERNAL MEDICINE

## 2021-04-13 PROCEDURE — 1036F TOBACCO NON-USER: CPT | Performed by: INTERNAL MEDICINE

## 2021-04-13 PROCEDURE — 1111F DSCHRG MED/CURRENT MED MERGE: CPT | Performed by: INTERNAL MEDICINE

## 2021-04-13 PROCEDURE — 4040F PNEUMOC VAC/ADMIN/RCVD: CPT | Performed by: INTERNAL MEDICINE

## 2021-04-13 PROCEDURE — G8427 DOCREV CUR MEDS BY ELIG CLIN: HCPCS | Performed by: INTERNAL MEDICINE

## 2021-04-13 PROCEDURE — 1123F ACP DISCUSS/DSCN MKR DOCD: CPT | Performed by: INTERNAL MEDICINE

## 2021-04-13 ASSESSMENT — ENCOUNTER SYMPTOMS
SHORTNESS OF BREATH: 0
BACK PAIN: 0
ANAL BLEEDING: 0
APNEA: 0
ABDOMINAL DISTENTION: 0
WHEEZING: 1
RHINORRHEA: 0
CHEST TIGHTNESS: 0
ABDOMINAL PAIN: 0
COUGH: 0

## 2021-04-13 NOTE — PROGRESS NOTES
Pulmonary and Sleep Medicine     Tuan Ornelas (:  1940) is a 79 y.o. male,Established patient, here for evaluation of the following chief complaint(s):  Follow-Up from Hospital (recurrent pneumonia, COPD)      ASSESSMENT/PLAN:  1. Chronic respiratory failure with hypoxia and hypercapnia ([address] on oxygen and bipap  2. Chronic diastolic congestive heart failure ([address]  3. GREGORIO treated with BiPAP  4. SOB (shortness of breath)    Continue with the current management with diuresis and BIPAP during sleep and as needed while awake. Continue with the oxygen to maintain adequate saturation. Will benefit from POC. Return in about 3 months (around 2021). SUBJECTIVE/OBJECTIVE:  He is here for follow-up. He was recently discharged from the hospital.  He had prolonged hospitalization with CHF and bilateral pleural effusions. He did well on diuretics. He is currently on noninvasive ventilation at home with BiPAP. He feels that helps him. He thinks he has obstructive sleep apnea since since being on the BiPAP he has not been sleepy or fatigued during the daytime. Prior to Visit Medications    Medication Sig Taking? Authorizing Provider   glipiZIDE (GLUCOTROL) 5 MG tablet TAKE 1 TABLET BY MOUTH EVERY MORNING BEFORE BREAKFAST Yes Douglas Gordillo MD   aspirin 81 MG EC tablet Take 1 tablet by mouth daily Yes Haris Shields MD   amiodarone (CORDARONE) 200 MG tablet Take 1 tablet by mouth 2 times daily Yes Haris Shields MD   atorvastatin (LIPITOR) 20 MG tablet Take 1 tablet by mouth daily Yes Haris Shields MD   metoprolol succinate (TOPROL XL) 100 MG extended release tablet Take 1 tablet by mouth daily Yes Haris Shields MD   NIFEdipine (ADALAT CC) 60 MG extended release tablet Take 1 tablet by mouth daily Yes Haris Shields MD   miconazole (MICOTIN) 2 % powder Apply topically 2 times daily.  Yes Haris Shields MD   furosemide (LASIX) 40 MG tablet Take 1 tablet by mouth 2 times daily Yes Haris Shields MD ferrous sulfate (IRON 325) 325 (65 Fe) MG tablet Take 1 tablet by mouth 2 times daily (with meals) Yes Ike Terry MD   folic acid (FOLVITE) 1 MG tablet Take 1 tablet by mouth daily Yes Ike Terry MD   polyethylene glycol (GLYCOLAX) 17 g packet Take 17 g by mouth daily as needed for Constipation Yes Ike Terry MD   tamsulosin (FLOMAX) 0.4 MG capsule Take 1 capsule by mouth 2 times daily Yes Ike Terry MD   pantoprazole (PROTONIX) 40 MG tablet Take 1 tablet by mouth daily Yes Ike Terry MD   vitamin D (ERGOCALCIFEROL) 1.25 MG (83977 UT) CAPS capsule Take 1 capsule by mouth once a week Yes Ike Terry MD   OXYGEN Inhale 2 L into the lungs continuous Yes Ike Terry MD   calcium carbonate (TUMS) 500 MG chewable tablet Take 1 tablet by mouth 3 times daily as needed for Heartburn  Patient not taking: Reported on 4/2/2021  Ike Terry MD   guaiFENesin (ROBITUSSIN) 100 MG/5ML syrup Take 10 mLs by mouth every 4 hours as needed for Cough  Patient not taking: Reported on 4/2/2021  Ike Terry MD   bisacodyl (DULCOLAX) 5 MG EC tablet Take 1 tablet by mouth daily as needed for Constipation  Patient not taking: Reported on 4/2/2021  Ike Terry MD   linaclotide [facility]) 145 MCG capsule Take 1 capsule by mouth every morning (before breakfast)  Patient not taking: Reported on 4/2/2021  Ike eTrry MD        Review of Systems   Constitutional: Negative for activity change, appetite change, chills, diaphoresis and fatigue. HENT: Negative for congestion, dental problem, drooling, ear discharge, postnasal drip and rhinorrhea. Eyes: Negative for visual disturbance. Respiratory: Positive for wheezing. Negative for apnea, cough, chest tightness and shortness of breath. Gastrointestinal: Negative for abdominal distention, abdominal pain and anal bleeding. Endocrine: Negative for cold intolerance, heat intolerance and polydipsia.    Genitourinary: Negative for difficulty urinating, dysuria, enuresis and flank pain. Musculoskeletal: Negative for arthralgias, back pain and gait problem. Allergic/Immunologic: Negative for environmental allergies. Neurological: Negative for dizziness, facial asymmetry, light-headedness and headaches. Vitals:    04/13/21 1115   BP: 130/70   Pulse: 60   Resp: 16   Temp: 97.5 °F (36.4 °C)   SpO2: 96%     Physical Exam  Vitals signs reviewed. Constitutional:       Appearance: Normal appearance. HENT:      Head: Normocephalic and atraumatic. Nose: Nose normal.   Eyes:      Extraocular Movements: Extraocular movements intact. Conjunctiva/sclera: Conjunctivae normal.   Neck:      Musculoskeletal: Normal range of motion and neck supple. Cardiovascular:      Rate and Rhythm: Normal rate and regular rhythm. Heart sounds: No murmur. No friction rub. Pulmonary:      Effort: No respiratory distress. Breath sounds: No stridor. Wheezing present. No rhonchi or rales. Abdominal:      General: There is no distension. Palpations: There is no mass. Tenderness: There is no abdominal tenderness. There is no guarding or rebound. Neurological:      Mental Status: He is alert and oriented to person, place, and time. An electronic signature was used to authenticate this note.     --Josy Hargrove MD

## 2021-04-14 LAB
ANION GAP SERPL CALCULATED.3IONS-SCNC: 12 MMOL/L (ref 7–19)
BASOPHILS ABSOLUTE: 0.1 K/UL (ref 0–0.2)
BASOPHILS RELATIVE PERCENT: 1.8 % (ref 0–1)
BUN BLDV-MCNC: 33 MG/DL (ref 8–23)
CALCIUM SERPL-MCNC: 8.8 MG/DL (ref 8.8–10.2)
CHLORIDE BLD-SCNC: 97 MMOL/L (ref 98–111)
CO2: 31 MMOL/L (ref 22–29)
CREAT SERPL-MCNC: 2.2 MG/DL (ref 0.5–1.2)
EOSINOPHILS ABSOLUTE: 0 K/UL (ref 0–0.6)
EOSINOPHILS RELATIVE PERCENT: 0 % (ref 0–5)
GFR AFRICAN AMERICAN: 35
GFR NON-AFRICAN AMERICAN: 29
GLUCOSE BLD-MCNC: 78 MG/DL (ref 74–109)
HCT VFR BLD CALC: 37.5 % (ref 42–52)
HEMOGLOBIN: 10.1 G/DL (ref 14–18)
HYPOCHROMIA: ABNORMAL
IMMATURE GRANULOCYTES #: 0.1 K/UL
LYMPHOCYTES ABSOLUTE: 1.3 K/UL (ref 1.1–4.5)
LYMPHOCYTES RELATIVE PERCENT: 26.7 % (ref 20–40)
MACROCYTES: ABNORMAL
MCH RBC QN AUTO: 29.2 PG (ref 27–31)
MCHC RBC AUTO-ENTMCNC: 26.9 G/DL (ref 33–37)
MCV RBC AUTO: 108.4 FL (ref 80–94)
MONOCYTES ABSOLUTE: 0.4 K/UL (ref 0–0.9)
MONOCYTES RELATIVE PERCENT: 8.4 % (ref 0–10)
NEUTROPHILS ABSOLUTE: 3.1 K/UL (ref 1.5–7.5)
NEUTROPHILS RELATIVE PERCENT: 61.7 % (ref 50–65)
PDW BLD-RTO: 14.9 % (ref 11.5–14.5)
PLATELET # BLD: 114 K/UL (ref 130–400)
PLATELET SLIDE REVIEW: ABNORMAL
PMV BLD AUTO: 11.1 FL (ref 9.4–12.4)
POTASSIUM SERPL-SCNC: 4.3 MMOL/L (ref 3.5–5)
RBC # BLD: 3.46 M/UL (ref 4.7–6.1)
SODIUM BLD-SCNC: 140 MMOL/L (ref 136–145)
TEAR DROP CELLS: ABNORMAL
WBC # BLD: 5 K/UL (ref 4.8–10.8)

## 2021-04-16 ENCOUNTER — TELEPHONE (OUTPATIENT)
Dept: CARDIOLOGY CLINIC | Age: 81
End: 2021-04-16

## 2021-04-16 ENCOUNTER — OFFICE VISIT (OUTPATIENT)
Dept: PALLATIVE CARE | Age: 81
End: 2021-04-16
Payer: MEDICARE

## 2021-04-16 VITALS
HEART RATE: 59 BPM | OXYGEN SATURATION: 96 % | DIASTOLIC BLOOD PRESSURE: 50 MMHG | SYSTOLIC BLOOD PRESSURE: 112 MMHG | TEMPERATURE: 97.5 F

## 2021-04-16 DIAGNOSIS — I50.32 CHRONIC DIASTOLIC CONGESTIVE HEART FAILURE (HCC): ICD-10-CM

## 2021-04-16 DIAGNOSIS — J96.11 CHRONIC RESPIRATORY FAILURE WITH HYPOXIA AND HYPERCAPNIA (HCC): ICD-10-CM

## 2021-04-16 DIAGNOSIS — R53.81 PHYSICAL DECONDITIONING: Primary | ICD-10-CM

## 2021-04-16 DIAGNOSIS — Z51.5 ENCOUNTER FOR PALLIATIVE CARE: ICD-10-CM

## 2021-04-16 DIAGNOSIS — R60.0 BILATERAL LOWER EXTREMITY EDEMA: ICD-10-CM

## 2021-04-16 DIAGNOSIS — J96.12 CHRONIC RESPIRATORY FAILURE WITH HYPOXIA AND HYPERCAPNIA (HCC): ICD-10-CM

## 2021-04-16 DIAGNOSIS — J44.9 CHRONIC OBSTRUCTIVE PULMONARY DISEASE, UNSPECIFIED COPD TYPE (HCC): ICD-10-CM

## 2021-04-16 PROCEDURE — 99349 HOME/RES VST EST MOD MDM 40: CPT | Performed by: NURSE PRACTITIONER

## 2021-04-16 PROCEDURE — G8417 CALC BMI ABV UP PARAM F/U: HCPCS | Performed by: NURSE PRACTITIONER

## 2021-04-16 PROCEDURE — 1123F ACP DISCUSS/DSCN MKR DOCD: CPT | Performed by: NURSE PRACTITIONER

## 2021-04-16 PROCEDURE — 4040F PNEUMOC VAC/ADMIN/RCVD: CPT | Performed by: NURSE PRACTITIONER

## 2021-04-16 PROCEDURE — 1036F TOBACCO NON-USER: CPT | Performed by: NURSE PRACTITIONER

## 2021-04-16 RX ORDER — BUMETANIDE 1 MG/1
1 TABLET ORAL 2 TIMES DAILY
Status: ON HOLD | COMMUNITY
Start: 2021-04-12 | End: 2021-08-28 | Stop reason: HOSPADM

## 2021-04-16 NOTE — PROGRESS NOTES
[facility]   Follow Up Note      Patient Name:  60201Maxine Husain Record Number:  [id number]  YOB: 1940    Date of Visit: 4/16/2021  Location of Visit:  [x]  Home    []  Other:      Care Team Members:   Dr. Sangeetha Fox - PCP  Dr. Wicho Waller - pulmonology  Dr. Cristina Greene - cardiology  Dr. Tariq [facility] - urology  Dr. Renee Prabhakar - nephrology  Dominique Sandhu Hematology    History obtained from:  patient, spouse, electronic medical record    Christiano Kaplan / RECOMMENDATIONS/PLAN:   Chadd Bill was seen today for follow-up. Diagnoses and all orders for this visit:    Physical deconditioning  · Improving  · Continue Home Health PT/OT    Bilateral lower extremity edema  Chronic diastolic congestive heart failure (HCC)  · S/P pacemaker placement. Needs to hook up remote monitoring device. Call placed to cardiology for instructions. · Currently seems well compensated from heart failure standpoint  · Continue diuretic, Elevate legs when sitting, Low salt diet encouraged  · Follow up with cardiology next week as scheduled    Chronic obstructive pulmonary disease, unspecified COPD type ([address]  Chronic respiratory failure with hypoxia and hypercapnia ([address]  · Reports tolerating Trilogy and feeling better since sleeping with it. · Follow up with Pulmonology as scheduled    · Encounter for palliative care  Current goals:  · He hopes to be able to discontinue oxygen at some point, but for now is willing to continue use. · He wants to live longer, improve or maintain function/quality of life, remain at home. I will see him again in about 6 weeks or sooner if needed.      CHIEF COMPLAINT:     Chief Complaint   Patient presents with    Follow-up     Palliative Care COPD/CHF       CLINICAL SUMMARY:        Mr. Uziel Denson is a 79year old male with PMH of COPD, CKD stage III, diabetes, HTN, GERD, hyperlipidemia, PVD,  CAD,  chronic back pain, osteoarthritis, diverticulitis, bladder cancer and more recently recurrent pneumonia, NSVT, sinus node dysfunction with prolonged pauses requiring pacemaker placement, chronic respiratory failure and now requiring Trilogy use at night. He has had four admissions between 1/17/2021 and 3/29/2021. These visits are summarized in initial consultation note dated 4/2/2021    HPI AND VISIT SUMMARY:   I saw Mr. Cristino Mc in his home. His wife was present and participated in the visit. Upon my arrival he was noted to be awake, alert, sitting in recliner in the living room, in no acute distress. He was wearing oxygen per nasal canula. He reports since my last visit he has been feeling better. He has been participating with therapy. He feels he is able to walk further distances without as much shortness of breath. He was able to go to lung doctor earlier this week. He has appt with cardiology next week. He has not yet hooked up his remote monitoring device. I placed a call to cardiology earlier today asking that they call to advise him regarding this. He is tolerating his Triology well and feels better during the day since he has been using it. He would like to discontinue his oxygen, but knows he still needs it for now. He would like to seek second opinion regarding his heart and lung issues at [address] [place] [address] once able to travel. Goal continues to be to gain strength and be able to function more independently so that he and he wife can do things they enjoy such as socializing with friends. Discussed goals of care, treatments/medictions, provided clinical updates and answered questions, actively listened to patient/family concerns, Assessed family understanding, concerns, and coping, Elicited patient's goals and values and used these to confirm, establish or modify goals of care.     ADVANCED CARE PLANNING:                                            Surrogate Decision Maker:  Primary Decision Maker: Shiela Cutler Child - [phone]    Durable Power of :no    Advanced Directives/Living Jung: yes  On file    Out of hospital medical orders in place to reflect resuscitation status: no    CLINICAL PAIN ASSESSMENT:     Score 1-10 (if verbal): 0    FUNCTIONAL ASSESSMENT:     Palliative Performance Scale: 50%    HISTORY:     Past medical history, surgical history, family history, social history unchanged     Current Medications:      Current Outpatient Medications:     bumetanide (BUMEX) 1 MG tablet, Take 1 mg by mouth 2 times daily, Disp: , Rfl:     glipiZIDE (GLUCOTROL) 5 MG tablet, TAKE 1 TABLET BY MOUTH EVERY MORNING BEFORE BREAKFAST, Disp: 60 tablet, Rfl: 0    aspirin 81 MG EC tablet, Take 1 tablet by mouth daily, Disp: 30 tablet, Rfl: 3    calcium carbonate (TUMS) 500 MG chewable tablet, Take 1 tablet by mouth 3 times daily as needed for Heartburn (Patient not taking: Reported on 4/2/2021), Disp: 60 tablet, Rfl: 0    amiodarone (CORDARONE) 200 MG tablet, Take 1 tablet by mouth 2 times daily, Disp: 60 tablet, Rfl: 0    atorvastatin (LIPITOR) 20 MG tablet, Take 1 tablet by mouth daily, Disp: 30 tablet, Rfl: 0    metoprolol succinate (TOPROL XL) 100 MG extended release tablet, Take 1 tablet by mouth daily, Disp: 30 tablet, Rfl: 3    NIFEdipine (ADALAT CC) 60 MG extended release tablet, Take 1 tablet by mouth daily, Disp: 30 tablet, Rfl: 3    guaiFENesin (ROBITUSSIN) 100 MG/5ML syrup, Take 10 mLs by mouth every 4 hours as needed for Cough (Patient not taking: Reported on 4/2/2021), Disp: 118 mL, Rfl: 0    miconazole (MICOTIN) 2 % powder, Apply topically 2 times daily. , Disp: 45 g, Rfl: 1    ferrous sulfate (IRON 325) 325 (65 Fe) MG tablet, Take 1 tablet by mouth 2 times daily (with meals), Disp: 30 tablet, Rfl: 3    folic acid (FOLVITE) 1 MG tablet, Take 1 tablet by mouth daily, Disp: 30 tablet, Rfl: 3    bisacodyl (DULCOLAX) 5 MG EC tablet, Take 1 tablet by mouth daily as needed for Constipation (Patient not taking: Reported on 4/2/2021), Disp: 30 tablet, Rfl: 0    polyethylene glycol (GLYCOLAX) 17 g packet, Take 17 g by mouth daily as needed for Constipation, Disp: 527 g, Rfl: 1    tamsulosin (FLOMAX) 0.4 MG capsule, Take 1 capsule by mouth 2 times daily, Disp: 60 capsule, Rfl: 3    linaclotide (LINZESS) 145 MCG capsule, Take 1 capsule by mouth every morning (before breakfast) (Patient not taking: Reported on 4/2/2021), Disp: 30 capsule, Rfl: 0    pantoprazole (PROTONIX) 40 MG tablet, Take 1 tablet by mouth daily, Disp: 30 tablet, Rfl: 3    vitamin D (ERGOCALCIFEROL) 1.25 MG (11156 UT) CAPS capsule, Take 1 capsule by mouth once a week, Disp: 5 capsule, Rfl: 0    OXYGEN, Inhale 2 L into the lungs continuous, Disp: 1 Container, Rfl: 5     Allergies:  Eliquis [apixaban] and Promethazine hcl    Review of Systems   Constitutional: Negative for chills and fever. Feels like he has more energy during the day time, not as tired since sleeping with Trilogy   HENT: Negative for trouble swallowing. Ears feel stopped up at times. Uses Afrin. Respiratory: Negative for cough, chest tightness and wheezing. Cardiovascular: Positive for leg swelling. Negative for chest pain and palpitations. Gastrointestinal: Positive for constipation (controlled. Takes Miralax if needed. ) and nausea (occasional). Negative for diarrhea and vomiting. Some reflux/heartburn occasionally   Genitourinary: Positive for frequency (due to diuretic). Negative for difficulty urinating. Musculoskeletal: Positive for back pain (low back- chronic). Neurological: Positive for numbness (neuropathy feet). Weakness: generalized - improving. Psychiatric/Behavioral: Negative.       OBJECTIVE:     Vitals:    04/16/21 1504   BP: (!) 112/50   Pulse: 59   Temp: 97.5 °F (36.4 °C)   SpO2: 96%     General appearance: alert and cooperative with exam, vital signs stable, well nourished, well developed, wearing O2 per nc  HEENT: atraumatic, sclera clear pupils and irises normal, external ears and nose are normal, lips normal.  Neck:  supple  Lungs: equal bilateral expansion, lungs clear, diminished bilaterally, poor effort/shallow breaths  Heart:  regular rate and rhythm, S1 S2 normal  Abdomen:  soft, non-tender, bowel sounds active  Extremities:  no cyanosis, clubbing, 1+ lower extremity and pedal edema, Left upper ext with resolving eccymosis  Neurologic: no focal neurologic deficits,alert and oriented   Psychiatric:  no recent or remote memory deficits, mood and affect appropriate  Skin: warm, dry     LAB DATA REVIEWED:     Orders Only on 04/14/2021   Component Date Value    Sodium 04/14/2021 140     Potassium 04/14/2021 4.3     Chloride 04/14/2021 97*    CO2 04/14/2021 31*    Anion Gap 04/14/2021 12     Glucose 04/14/2021 78     BUN 04/14/2021 33*    CREATININE 04/14/2021 2.2*    GFR Non- 04/14/2021 29*    GFR  04/14/2021 35*    Calcium 04/14/2021 8.8    Orders Only on 04/14/2021   Component Date Value    WBC 04/14/2021 5.0     RBC 04/14/2021 3.46*    Hemoglobin 04/14/2021 10.1*    Hematocrit 04/14/2021 37.5*    MCV 04/14/2021 108.4*    MCH 04/14/2021 29.2     MCHC 04/14/2021 26.9*    RDW 04/14/2021 14.9*    Platelets 68/43/3899 114*    MPV 04/14/2021 11.1     PLATELET SLIDE REVIEW 04/14/2021 Decreased     Neutrophils % 04/14/2021 61.7     Lymphocytes % 04/14/2021 26.7     Monocytes % 04/14/2021 8.4     Eosinophils % 04/14/2021 0.0     Basophils % 04/14/2021 1.8*    Neutrophils Absolute 04/14/2021 3.1     Immature Granulocytes # 04/14/2021 0.1     Lymphocytes Absolute 04/14/2021 1.3     Monocytes Absolute 04/14/2021 0.40     Eosinophils Absolute 04/14/2021 0.00     Basophils Absolute 04/14/2021 0.10     Macrocytes 04/14/2021 1+*    Hypochromia 04/14/2021 3+*    Tear Drop Cells 04/14/2021 Occasional*       Established Patient Visit Time: 40 minutes, Greater than 50% of the time in this visit was spent counseling and coordinating care of this patient.        Electronically signed by ELISABETH Luna CNP on 4/16/2021 at 5:54 PM

## 2021-04-19 ENCOUNTER — OFFICE VISIT (OUTPATIENT)
Dept: CARDIOLOGY CLINIC | Age: 81
End: 2021-04-19
Payer: MEDICARE

## 2021-04-19 VITALS
SYSTOLIC BLOOD PRESSURE: 126 MMHG | WEIGHT: 227 LBS | HEIGHT: 72 IN | HEART RATE: 62 BPM | OXYGEN SATURATION: 98 % | DIASTOLIC BLOOD PRESSURE: 60 MMHG | BODY MASS INDEX: 30.75 KG/M2

## 2021-04-19 DIAGNOSIS — I49.5 SA NODE DYSFUNCTION (HCC): ICD-10-CM

## 2021-04-19 DIAGNOSIS — R06.02 SOB (SHORTNESS OF BREATH): ICD-10-CM

## 2021-04-19 DIAGNOSIS — Z95.0 PACEMAKER: ICD-10-CM

## 2021-04-19 DIAGNOSIS — E78.00 PURE HYPERCHOLESTEROLEMIA: ICD-10-CM

## 2021-04-19 DIAGNOSIS — I50.32 CHRONIC DIASTOLIC CONGESTIVE HEART FAILURE (HCC): Primary | ICD-10-CM

## 2021-04-19 DIAGNOSIS — I10 ESSENTIAL HYPERTENSION: ICD-10-CM

## 2021-04-19 PROCEDURE — 99024 POSTOP FOLLOW-UP VISIT: CPT | Performed by: INTERNAL MEDICINE

## 2021-04-19 PROCEDURE — 1111F DSCHRG MED/CURRENT MED MERGE: CPT | Performed by: INTERNAL MEDICINE

## 2021-04-19 PROCEDURE — 1123F ACP DISCUSS/DSCN MKR DOCD: CPT | Performed by: INTERNAL MEDICINE

## 2021-04-19 PROCEDURE — 93280 PM DEVICE PROGR EVAL DUAL: CPT | Performed by: INTERNAL MEDICINE

## 2021-04-19 PROCEDURE — G8417 CALC BMI ABV UP PARAM F/U: HCPCS | Performed by: INTERNAL MEDICINE

## 2021-04-19 PROCEDURE — 4040F PNEUMOC VAC/ADMIN/RCVD: CPT | Performed by: INTERNAL MEDICINE

## 2021-04-19 PROCEDURE — 1036F TOBACCO NON-USER: CPT | Performed by: INTERNAL MEDICINE

## 2021-04-19 PROCEDURE — G8427 DOCREV CUR MEDS BY ELIG CLIN: HCPCS | Performed by: INTERNAL MEDICINE

## 2021-04-19 ASSESSMENT — ENCOUNTER SYMPTOMS
EYES NEGATIVE: 1
RESPIRATORY NEGATIVE: 1
NAUSEA: 0
GASTROINTESTINAL NEGATIVE: 1
DIARRHEA: 0
SHORTNESS OF BREATH: 0
VOMITING: 0

## 2021-04-19 NOTE — PROGRESS NOTES
Pacemaker interrogated  Presenting rhythm:  AP VS, AP 58%,  <1%  Battey voltage 10.6 -11.2 years  Lead status:  Lead impedance within range and stable  Sensing:  P waves 3.3 mV,  R waves >12 mV  Thresholds:  Atrial 1.125V @ 0.4ms, ventricular 0.625@ 0.4ms  Observations:  none  Reprogramming for sensitivity and threshold testing  Next remote appointment:  7/19/21

## 2021-04-19 NOTE — PROGRESS NOTES
[facility]                            Date:  4/19/2021  Patient: Bradly Raya  Age:  79 y.o., 1940      Reason for evaluation:         SUBJECTIVE:    Returns today for follow-up assessment. Overall doing well recent pacemaker implantation device working appropriately wound healed. Occasional nausea otherwise feels well denies chest pain denies dyspnea does have some pedal edema 1-2+ and is on Bumex. No other complaints or issues reported. Review of Systems   Constitutional: Negative. Negative for chills, fever and unexpected weight change. HENT: Negative. Eyes: Negative. Respiratory: Negative. Negative for shortness of breath. Cardiovascular: Negative. Negative for chest pain. Gastrointestinal: Negative. Negative for diarrhea, nausea and vomiting. Endocrine: Negative. Genitourinary: Negative. Musculoskeletal: Negative. Skin: Negative. Neurological: Negative. All other systems reviewed and are negative. OBJECTIVE:     /60   Pulse 62   Ht 6' (1.829 m)   Wt 227 lb (103 kg)   SpO2 98%   BMI 30.79 kg/m²     Labs:   CBC: No results for input(s): WBC, HGB, HCT, PLT in the last 72 hours. BMP:No results for input(s): NA, K, CO2, BUN, CREATININE, LABGLOM, GLUCOSE in the last 72 hours. BNP: No results for input(s): BNP in the last 72 hours. PT/INR: No results for input(s): PROTIME, INR in the last 72 hours. APTT:No results for input(s): APTT in the last 72 hours. CARDIAC ENZYMES:No results for input(s): CKTOTAL, CKMB, CKMBINDEX, TROPONINI in the last 72 hours. FASTING LIPID PANEL:  Lab Results   Component Value Date    HDL 38 10/01/2018    LDLCALC 72 10/01/2018    TRIG 176 10/01/2018     LIVER PROFILE:No results for input(s): AST, ALT, LABALBU in the last 72 hours.         Past Medical History:   Diagnosis Date    Acute liver failure without hepatic coma 10/23/2018    Back pain     \"with tired legs as a result\"    Bladder cancer ([address] 12/19/2018    Blood circulation, collateral     Carotid arterial disease (HCC)     recent surgery    CKD (chronic kidney disease), 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/2018    COPD with acute lower respiratory infection ([address] 2/24/2021    GERD (gastroesophageal reflux disease)     Hyperlipidemia     Hypertension     Hypertension     Palliative care patient 10/23/2018    Pneumonia due to infectious organism 11/06/2018    Primary osteoarthritis of left knee 10/14/2018    PVD (peripheral vascular disease) (HCC)     Tremor     Tremor on Right side x 1-2 weeks per stepdaughter    Type 2 diabetes mellitus with complication, without long-term current use of insulin ([address] 1/21/2021     Past Surgical History:   Procedure Laterality Date    BACK SURGERY      CARDIAC CATHETERIZATION  03/23/2021    100% RCA    COLONOSCOPY  2007?  CYSTOSCOPY Bilateral 12/19/2018    CYSTOSCOPY, BIOSPY FULGURATION OF BLADDER TUMOR POSSIBLE TURBT, RETROGRADE PYELOGRAM performed by Neelam Tapia MD at [facility] COLONOSCOPY FLX DX W/COLLJ SPEC WHEN PFRMD N/A 09/11/2017    Dr Bradley Maher internal hemorrhoids, diverticular disease-HP-No recall (age)   [facility] [place] REVISE MEDIAN N/CARPAL TUNNEL SURG Left 07/18/2018    OPEN CARPAL TUNNEL RELEASE performed by Yasmine Justin MD at [address] Left 08/28/2018    LEFT CAROTID ENDARTERECTOMY WITH VEIN PATCH ANGIOPLASTY AND COMPLETION ANGIOGRAM performed by Lavina Kehr, MD at [address] 10/15/2018    LEFT COMPLEX TOTAL KNEE ARTHROPLASTY performed by Yasmine Justin MD at [facility] VASCULAR SURGERY  04/21/2015    Kimberli BESS Ultrasound guided access of left common femoral artery. Aortogram.Diagnostic right lower extremity arteriogram.Radiologic supervision and interpretation.     VASCULAR SURGERY  01/13/2015    Kimberli Marie M.D Atherectomy,angioplasty,and stenting of left superficial femoral artery.  VASCULAR SURGERY  03/11/2014    Renetta De La O M.D. Ultrasound-guided access of right common femoral artery. Aortogram.Left lower extremity arteriogram.Atherectomy and angioplasty of left superficial femoral artery. Radiologic supervision and interpretation.  VASCULAR SURGERY  01/18/2013    Renetta BESS Aortogram.Multistation arteriogram right lower extremity. Laser atherectomy and angioplasty of right superficial femoral artery. Selective catheterization of right tibioperoneal trunk. Angioplasty of peroneal artery and tibioperoneal trunk.  VASCULAR SURGERY  10/30/2018    [facility]. Ultrasound guided cannulation of right internal vein. Placement of right internal jugular vein tunneled dialysis catheter bard equistream xk 23cm tip to cuff    VASCULAR SURGERY  12/17/2018    SJS. Removal of tunneled dilaysis catheter right internal jugular vein. Family History   Problem Relation Age of Onset    Colon Cancer Father     Diabetes Brother     Colon Polyps Neg Hx     Liver Cancer Neg Hx     Liver Disease Neg Hx     Esophageal Cancer Neg Hx     Rectal Cancer Neg Hx     Stomach Cancer Neg Hx      Allergies   Allergen Reactions    Eliquis [Apixaban] Other (See Comments)     \"almost bled to death\"    Promethazine Hcl Other (See Comments)     He became encephalopathic for several hours and was not responsive.      Current Outpatient Medications   Medication Sig Dispense Refill    glipiZIDE (GLUCOTROL) 5 MG tablet TAKE 1 TABLET BY MOUTH EVERY MORNING BEFORE BREAKFAST 60 tablet 0    aspirin 81 MG EC tablet Take 1 tablet by mouth daily 30 tablet 3    amiodarone (CORDARONE) 200 MG tablet Take 1 tablet by mouth 2 times daily 60 tablet 0    atorvastatin (LIPITOR) 20 MG tablet Take 1 tablet by mouth daily 30 tablet 0    metoprolol succinate (TOPROL XL) 100 MG extended release tablet Take 1 tablet by mouth daily 30 tablet 3    NIFEdipine (ADALAT CC) 60 MG extended release tablet Take 1 date: 6/3/2003     Years since quittin.8    Smokeless tobacco: Never Used   Substance and Sexual Activity    Alcohol use: Yes     Alcohol/week: 12.0 standard drinks     Types: 12 Glasses of wine per week     Comment: 2 glasses of wine every night    Drug use: No    Sexual activity: Yes     Partners: Female   Lifestyle    Physical activity     Days per week: Not on file     Minutes per session: Not on file    Stress: Not on file   Relationships    Social connections     Talks on phone: Not on file     Gets together: Not on file     Attends Advent service: Not on file     Active member of club or organization: Not on file     Attends meetings of clubs or organizations: Not on file     Relationship status: Not on file    Intimate partner violence     Fear of current or ex partner: Not on file     Emotionally abused: Not on file     Physically abused: Not on file     Forced sexual activity: Not on file   Other Topics Concern    Not on file   Social History Narrative    Not on file       Physical Examination:  /60   Pulse 62   Ht 6' (1.829 m)   Wt 227 lb (103 kg)   SpO2 98%   BMI 30.79 kg/m²   Physical Exam  Vitals signs reviewed. Constitutional:       Appearance: He is well-developed. Neck:      Vascular: No carotid bruit or JVD. Cardiovascular:      Rate and Rhythm: Normal rate and regular rhythm. Heart sounds: Normal heart sounds. No murmur. No friction rub. No gallop. Pulmonary:      Effort: Pulmonary effort is normal. No respiratory distress. Breath sounds: Normal breath sounds. No wheezing or rales. Abdominal:      General: There is no distension. Tenderness: There is no abdominal tenderness. Musculoskeletal:         General: Swelling present. Comments: 1-2+ mildly pitting edema bilaterally   Lymphadenopathy:      Cervical: No cervical adenopathy. Skin:     General: Skin is warm and dry. ASSESSMENT:     Diagnosis Orders   1.  Chronic diastolic congestive heart failure ([facility]     2. Essential hypertension     3. SOB (shortness of breath)     4. Pacemaker     5. Pure hypercholesterolemia         PLAN:  No orders of the defined types were placed in this encounter. No orders of the defined types were placed in this encounter. 1. Continue present medications  2. Recommend follow-up assessment in 6 months read    Return in about 6 months (around 10/19/2021) for return to Dr. Cindy Serrano only. Savita Garcia MD 4/19/2021 1:57 PM CDT    [facility]      Thisdictation was generated by voice recognition computer software. Although all attempts are made to edit the dictation for accuracy, there may be errors in the transcription that are not intended.

## 2021-04-22 ENCOUNTER — TELEPHONE (OUTPATIENT)
Dept: CARDIOLOGY CLINIC | Age: 81
End: 2021-04-22

## 2021-04-26 RX ORDER — LINACLOTIDE 145 UG/1
CAPSULE, GELATIN COATED ORAL
Qty: 30 CAPSULE | Refills: 0 | OUTPATIENT
Start: 2021-04-26

## 2021-04-26 RX ORDER — AMIODARONE HYDROCHLORIDE 200 MG/1
TABLET ORAL
Qty: 60 TABLET | Refills: 0 | OUTPATIENT
Start: 2021-04-26

## 2021-04-28 RX ORDER — AMIODARONE HYDROCHLORIDE 200 MG/1
200 TABLET ORAL 2 TIMES DAILY
Qty: 60 TABLET | Refills: 3 | OUTPATIENT
Start: 2021-04-28

## 2021-05-03 RX ORDER — AMIODARONE HYDROCHLORIDE 200 MG/1
200 TABLET ORAL DAILY
Qty: 30 TABLET | Refills: 3 | Status: SHIPPED | OUTPATIENT
Start: 2021-05-03 | End: 2021-09-09

## 2021-05-10 LAB
ANION GAP SERPL CALCULATED.3IONS-SCNC: 14 MMOL/L (ref 7–19)
BUN BLDV-MCNC: 45 MG/DL (ref 8–23)
CALCIUM SERPL-MCNC: 9.4 MG/DL (ref 8.8–10.2)
CHLORIDE BLD-SCNC: 99 MMOL/L (ref 98–111)
CO2: 31 MMOL/L (ref 22–29)
CREAT SERPL-MCNC: 2.7 MG/DL (ref 0.5–1.2)
GFR AFRICAN AMERICAN: 28
GFR NON-AFRICAN AMERICAN: 23
GLUCOSE BLD-MCNC: 118 MG/DL (ref 74–109)
POTASSIUM SERPL-SCNC: 4 MMOL/L (ref 3.5–5)
SODIUM BLD-SCNC: 144 MMOL/L (ref 136–145)

## 2021-05-11 ENCOUNTER — TELEPHONE (OUTPATIENT)
Dept: UROLOGY | Age: 81
End: 2021-05-11

## 2021-05-11 DIAGNOSIS — R35.1 BPH ASSOCIATED WITH NOCTURIA: ICD-10-CM

## 2021-05-11 DIAGNOSIS — N40.1 BPH ASSOCIATED WITH NOCTURIA: ICD-10-CM

## 2021-05-11 DIAGNOSIS — Z85.51 HISTORY OF BLADDER CANCER: Primary | ICD-10-CM

## 2021-05-11 RX ORDER — TAMSULOSIN HYDROCHLORIDE 0.4 MG/1
0.4 CAPSULE ORAL 2 TIMES DAILY
Qty: 60 CAPSULE | Refills: 3 | Status: CANCELLED | OUTPATIENT
Start: 2021-05-11

## 2021-05-11 NOTE — TELEPHONE ENCOUNTER
Patient needs to get a refill on medication that helps with his panicked urinates, Patient was unable to give [facility] the name of the medication,Patient ask for a nurse to give him a call to discuss getting medication. Patient does use [facility]

## 2021-06-06 ASSESSMENT — ENCOUNTER SYMPTOMS
TROUBLE SWALLOWING: 0
DIARRHEA: 0
CHEST TIGHTNESS: 0
COUGH: 0
WHEEZING: 0
CONSTIPATION: 1
NAUSEA: 1
BACK PAIN: 1
VOMITING: 0

## 2021-06-14 ENCOUNTER — TELEPHONE (OUTPATIENT)
Dept: UROLOGY | Age: 81
End: 2021-06-14

## 2021-06-21 DIAGNOSIS — I49.5 SA NODE DYSFUNCTION (HCC): ICD-10-CM

## 2021-06-21 DIAGNOSIS — Z95.0 PACEMAKER: Primary | ICD-10-CM

## 2021-06-24 NOTE — PROGRESS NOTES
03/26/21 1000   Restrictions/Precautions   Restrictions/Precautions Surgical Protocols; Fall Risk   Implants present? Pacemaker   Pain Screening   Patient Currently in Pain No   Pain Assessment   Pain Assessment 0-10   Pain Level 0   Oxygen Therapy   SpO2 93 %  (Dropped to 81)   Pulse Oximeter Device Mode Intermittent   Pulse Oximeter Device Location Left;Finger   O2 Device Nasal cannula   O2 Flow Rate (L/min) 3 L/min   Bed Mobility   Supine to Sit Stand by assistance   Sit to Supine Stand by assistance   Scooting Independent   Comment Utilizes triplanar method. Transfers   Sit to Stand Contact guard assistance   Stand to sit Contact guard assistance   Ambulation   Ambulation? Yes   Ambulation 1   Surface level tile   Device Rolling Walker   Other Apparatus O2  (3 L)   Assistance Contact guard assistance   Quality of Gait Reciprocal. O2 dropped with short distances. Gait Deviations Slow Nae;Decreased step length   Distance 6'   Comments In room ambulation. Exercises   Comments LAQ with light manual resistance 1x10; hip ABD/ADD with light manual resistance 1x10; hip flex/ext with light manual resistance 1x10; DF with manual resistance 1x10; LAQ with manual resistance with DF and PF at end range 2x10. Conditions Requiring Skilled Therapeutic Intervention   Assessment Pt. SBA with bed mobility and CGA with transfers and gait. Pt's O2 dropped to 81 but recovered to 90s with rest breaks. Pt. could only ambulate small distances before O2 dropped. Activity Tolerance   Activity Tolerance Patient Tolerated treatment well; Other  (Pt. limited by O2 saturation. Pt. not aware when O2 drops.)   Safety Devices   Type of devices All fall risk precautions in place;Call light within reach; Chair alarm in place; Patient at risk for falls; Left in chair Wife Kaitlynn (VOF) notified and verbalized understanding of information given. Order placed

## 2021-06-28 ENCOUNTER — PROCEDURE VISIT (OUTPATIENT)
Dept: UROLOGY | Age: 81
End: 2021-06-28
Payer: MEDICARE

## 2021-06-28 VITALS — HEIGHT: 72 IN | TEMPERATURE: 98 F | WEIGHT: 233 LBS | BODY MASS INDEX: 31.56 KG/M2

## 2021-06-28 DIAGNOSIS — Z85.51 HISTORY OF BLADDER CANCER: ICD-10-CM

## 2021-06-28 DIAGNOSIS — N40.1 BENIGN PROSTATIC HYPERPLASIA WITH NOCTURIA: Primary | ICD-10-CM

## 2021-06-28 DIAGNOSIS — R35.1 BENIGN PROSTATIC HYPERPLASIA WITH NOCTURIA: Primary | ICD-10-CM

## 2021-06-28 LAB
BILIRUBIN, POC: NORMAL
BLOOD URINE, POC: NORMAL
CLARITY, POC: CLEAR
COLOR, POC: YELLOW
GLUCOSE URINE, POC: NORMAL
KETONES, POC: NORMAL
LEUKOCYTE EST, POC: NORMAL
NITRITE, POC: NORMAL
PH, POC: 6
PROTEIN, POC: NORMAL
SPECIFIC GRAVITY, POC: 1.01
UROBILINOGEN, POC: 0.2

## 2021-06-28 PROCEDURE — 52000 CYSTOURETHROSCOPY: CPT | Performed by: UROLOGY

## 2021-06-28 PROCEDURE — 81003 URINALYSIS AUTO W/O SCOPE: CPT | Performed by: UROLOGY

## 2021-06-28 NOTE — PROGRESS NOTES
BLADDER CANCER  Patient was first diagnosed with bladder cancer 30 month(s) ago. 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 None  Last upper tract study: 11 month(s) ago. Study was a CT abdomen pelvis done 7/31/2020  He is here today for bladder cancer surveillance by cystoscopy    BPH/LUTS:  Patient is here today for lower urinary tract symptoms which started  year(s) ago. Recently the urinary symptoms are: show no change  Current medical Rx for BPH/LUTS: Tamsulosin, Myrbetriq,  Lower urinary tract symptoms: frequency, decreased urinary stream and nocturia, 2 times per night, and urgency. This is his largest complaint he is not sure the medication is working anymore. PSA done 5/27/2020 was 0.8       Cystoscopy Operative Note (6/28/21)  Surgeon: Charly Perkins MD  Anesthesia: Urethral 2% Xylocaine   Indications: History of bladder cancer  Position: Supine    Findings:   The patient was prepped and draped in the usual sterile fashion. The flexible cystoscope was advanced through the urethra and into the bladder under direct vision. The bladder was thoroughly inspected and the following was noted:    Residual Urine: mild  Urethra: normal appearing urethra with no masses, tenderness or lesions  Prostate: partially obstructing lateral lobes of prostate; median lobe present? no.  Mild lateral lobe enlargement. Bladder: No tumors or CIS noted. No bladder diverticulum. There was mild trabeculation noted. No recurrent papillary tumor seen  Ureters: Clear efflux from both ureters. Orifices with normal configuration and location. The cystoscope was removed. The patient tolerated the procedure well.   The patient was given a post procedure instructions

## 2021-07-13 ENCOUNTER — OFFICE VISIT (OUTPATIENT)
Dept: PULMONOLOGY | Age: 81
End: 2021-07-13
Payer: MEDICARE

## 2021-07-13 VITALS
HEIGHT: 72 IN | BODY MASS INDEX: 30.2 KG/M2 | TEMPERATURE: 98.3 F | OXYGEN SATURATION: 98 % | HEART RATE: 60 BPM | WEIGHT: 223 LBS | SYSTOLIC BLOOD PRESSURE: 136 MMHG | DIASTOLIC BLOOD PRESSURE: 74 MMHG

## 2021-07-13 DIAGNOSIS — I50.32 CHRONIC DIASTOLIC CONGESTIVE HEART FAILURE (HCC): ICD-10-CM

## 2021-07-13 DIAGNOSIS — R06.02 SOB (SHORTNESS OF BREATH): ICD-10-CM

## 2021-07-13 DIAGNOSIS — G47.33 OSA TREATED WITH BIPAP: ICD-10-CM

## 2021-07-13 DIAGNOSIS — J96.11 CHRONIC RESPIRATORY FAILURE WITH HYPOXIA AND HYPERCAPNIA (HCC): Primary | ICD-10-CM

## 2021-07-13 DIAGNOSIS — J96.12 CHRONIC RESPIRATORY FAILURE WITH HYPOXIA AND HYPERCAPNIA (HCC): Primary | ICD-10-CM

## 2021-07-13 PROCEDURE — 99214 OFFICE O/P EST MOD 30 MIN: CPT | Performed by: INTERNAL MEDICINE

## 2021-07-13 PROCEDURE — G8417 CALC BMI ABV UP PARAM F/U: HCPCS | Performed by: INTERNAL MEDICINE

## 2021-07-13 PROCEDURE — G8427 DOCREV CUR MEDS BY ELIG CLIN: HCPCS | Performed by: INTERNAL MEDICINE

## 2021-07-13 PROCEDURE — 1036F TOBACCO NON-USER: CPT | Performed by: INTERNAL MEDICINE

## 2021-07-13 PROCEDURE — 1123F ACP DISCUSS/DSCN MKR DOCD: CPT | Performed by: INTERNAL MEDICINE

## 2021-07-13 PROCEDURE — 4040F PNEUMOC VAC/ADMIN/RCVD: CPT | Performed by: INTERNAL MEDICINE

## 2021-07-13 ASSESSMENT — ENCOUNTER SYMPTOMS
WHEEZING: 1
ABDOMINAL DISTENTION: 0
COUGH: 0
ANAL BLEEDING: 0
CHEST TIGHTNESS: 0
RHINORRHEA: 0
BACK PAIN: 0
APNEA: 0
ABDOMINAL PAIN: 0
SHORTNESS OF BREATH: 0

## 2021-07-13 NOTE — PROGRESS NOTES
Pulmonary and Sleep Medicine     Leopold Levee (:  1940) is a 79 y.o. male,Established patient, here for evaluation of the following chief complaint(s):  Follow-up (3 month follow up for SOB and chronic respiratory failure)      ASSESSMENT/PLAN:  1. Chronic respiratory failure with hypoxia and hypercapnia ([address] on oxygen and bipap  2. Chronic diastolic congestive heart failure ([address]  3. GREGORIO treated with BiPAP  4. SOB (shortness of breath)    Continue with the current management with the CPAP and oxygen. Continue with lasix. Return in about 6 months (around 2022). SUBJECTIVE/OBJECTIVE:  He is here for follow up on chronic resp failure. He is using the CPaP and oxygen during sleep. Taking lasix. Using portable concentrator. Denies new complaints. Feels meds and CPAP help him. Prior to Visit Medications    Medication Sig Taking? Authorizing Provider   mirabegron (MYRBETRIQ) 25 MG TB24 Take 1 tablet by mouth daily Yes Gill Toussaint MD   amiodarone (CORDARONE) 200 MG tablet Take 1 tablet by mouth daily Yes ELISABETH Rodríguez NP   bumetanide (BUMEX) 1 MG tablet Take 1 mg by mouth 2 times daily Yes Historical Provider, MD   glipiZIDE (GLUCOTROL) 5 MG tablet TAKE 1 TABLET BY MOUTH EVERY MORNING BEFORE BREAKFAST Yes Johnny Luu MD   aspirin 81 MG EC tablet Take 1 tablet by mouth daily Yes Napoleon Rivera MD   atorvastatin (LIPITOR) 20 MG tablet Take 1 tablet by mouth daily Yes Napoleon Rivera MD   metoprolol succinate (TOPROL XL) 100 MG extended release tablet Take 1 tablet by mouth daily Yes Napoleon Rivera MD   NIFEdipine (ADALAT CC) 60 MG extended release tablet Take 1 tablet by mouth daily Yes Napoleon Rivera MD   guaiFENesin (ROBITUSSIN) 100 MG/5ML syrup Take 10 mLs by mouth every 4 hours as needed for Cough Yes Napoleon Rivera MD   miconazole (MICOTIN) 2 % powder Apply topically 2 times daily.  Yes Napoleon Rivera MD   ferrous sulfate (IRON 325) 325 (65 Fe) MG tablet Take 1 tablet Conjunctivae normal.   Cardiovascular:      Rate and Rhythm: Normal rate and regular rhythm. Heart sounds: No murmur heard. No friction rub. Pulmonary:      Effort: Pulmonary effort is normal. No respiratory distress. Breath sounds: Normal breath sounds. No stridor. No wheezing, rhonchi or rales. Abdominal:      General: There is no distension. Palpations: There is no mass. Tenderness: There is no abdominal tenderness. There is no guarding or rebound. Musculoskeletal:      Cervical back: Normal range of motion and neck supple. Neurological:      Mental Status: He is alert and oriented to person, place, and time. An electronic signature was used to authenticate this note.     --Papo Norman MD

## 2021-07-19 DIAGNOSIS — I49.5 SA NODE DYSFUNCTION (HCC): ICD-10-CM

## 2021-07-19 DIAGNOSIS — Z95.0 PACEMAKER: Primary | ICD-10-CM

## 2021-07-19 PROCEDURE — 93294 REM INTERROG EVL PM/LDLS PM: CPT | Performed by: INTERNAL MEDICINE

## 2021-07-19 PROCEDURE — 93296 REM INTERROG EVL PM/IDS: CPT | Performed by: INTERNAL MEDICINE

## 2021-08-04 ENCOUNTER — TELEPHONE (OUTPATIENT)
Dept: UROLOGY | Age: 81
End: 2021-08-04

## 2021-08-04 NOTE — TELEPHONE ENCOUNTER
Pt called requesting to have [facility], [address]  [phone]. Pt would like it there due to it being easier for him to get in and out.     Thank you

## 2021-08-16 ENCOUNTER — OFFICE VISIT (OUTPATIENT)
Dept: UROLOGY | Age: 81
End: 2021-08-16
Payer: MEDICARE

## 2021-08-16 VITALS — WEIGHT: 225 LBS | HEIGHT: 72 IN | BODY MASS INDEX: 30.48 KG/M2

## 2021-08-16 DIAGNOSIS — Z85.51 HISTORY OF BLADDER CANCER: ICD-10-CM

## 2021-08-16 DIAGNOSIS — N40.1 BENIGN PROSTATIC HYPERPLASIA WITH LOWER URINARY TRACT SYMPTOMS, SYMPTOM DETAILS UNSPECIFIED: Primary | ICD-10-CM

## 2021-08-16 LAB
BILIRUBIN, POC: ABNORMAL
BLOOD URINE, POC: ABNORMAL
CLARITY, POC: CLEAR
COLOR, POC: YELLOW
GLUCOSE URINE, POC: ABNORMAL
KETONES, POC: ABNORMAL
LEUKOCYTE EST, POC: ABNORMAL
NITRITE, POC: ABNORMAL
PH, POC: 5.5
PROTEIN, POC: ABNORMAL
SPECIFIC GRAVITY, POC: 1.01
UROBILINOGEN, POC: 0.2

## 2021-08-16 PROCEDURE — 1123F ACP DISCUSS/DSCN MKR DOCD: CPT | Performed by: NURSE PRACTITIONER

## 2021-08-16 PROCEDURE — 81003 URINALYSIS AUTO W/O SCOPE: CPT | Performed by: NURSE PRACTITIONER

## 2021-08-16 PROCEDURE — 1036F TOBACCO NON-USER: CPT | Performed by: NURSE PRACTITIONER

## 2021-08-16 PROCEDURE — G8427 DOCREV CUR MEDS BY ELIG CLIN: HCPCS | Performed by: NURSE PRACTITIONER

## 2021-08-16 PROCEDURE — G8417 CALC BMI ABV UP PARAM F/U: HCPCS | Performed by: NURSE PRACTITIONER

## 2021-08-16 PROCEDURE — 4040F PNEUMOC VAC/ADMIN/RCVD: CPT | Performed by: NURSE PRACTITIONER

## 2021-08-16 PROCEDURE — 99214 OFFICE O/P EST MOD 30 MIN: CPT | Performed by: NURSE PRACTITIONER

## 2021-08-16 PROCEDURE — 51798 US URINE CAPACITY MEASURE: CPT | Performed by: NURSE PRACTITIONER

## 2021-08-16 RX ORDER — TROSPIUM CHLORIDE 20 MG/1
20 TABLET, FILM COATED ORAL DAILY
Qty: 90 TABLET | Refills: 3 | Status: SHIPPED | OUTPATIENT
Start: 2021-08-16 | End: 2021-12-13 | Stop reason: SINTOL

## 2021-08-16 NOTE — PROGRESS NOTES
Yonatan Lawrence is a [de-identified] y.o., male, Established patient who presents today   Chief Complaint   Patient presents with    Follow-up     I am here today for BPH-had PSA       Benign Prostatic Hypertrophy  This is a chronic problem. The current episode started more than 1 year ago. The problem has been gradually worsening since onset. Irritative symptoms include frequency, nocturia (x3) and urgency. Obstructive symptoms include dribbling, a slower stream and a weak stream. Obstructive symptoms do not include incomplete emptying, an intermittent stream or straining. Associated symptoms include hesitancy. Pertinent negatives include no chills, dysuria, hematuria, nausea or vomiting. (Wears briefs daily) AUA score is 8-19 (15/35, bother score of 5). The symptoms are aggravated by diuretics (Going to speak with PCP about decreasing diuretic back to once daily). Past treatments include tamsulosin (Myrbetriq 25mg). The treatment provided moderate relief. PSA  8/6/21  0.6    See previous history on bladder cancer from Dr. Angie Hanson 6/28/21 note as copied below:  BLADDER CANCER  Patient was first diagnosed with bladder cancer 30 month(s) ago. 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 None  Last upper tract study: 11 month(s) ago. Study was a CT abdomen pelvis done 7/31/2020  He is here today for bladder cancer surveillance by cystoscopy     REVIEW OF SYSTEMS:  Review of Systems   Constitutional: Negative for chills and fever. Gastrointestinal: Negative for abdominal distention, abdominal pain, nausea and vomiting. Genitourinary: Positive for frequency, hesitancy, nocturia (x3) and urgency.  Negative for difficulty urinating, dysuria, flank pain, hematuria and incomplete emptying. Musculoskeletal: Negative for back pain and gait problem. Psychiatric/Behavioral: Negative for agitation and confusion. PHYSICAL EXAM:  Ht 6' (1.829 m)   Wt 225 lb (102.1 kg)   BMI 30.52 kg/m²   Physical Exam  Vitals and nursing note reviewed. Constitutional:       General: He is not in acute distress. Appearance: Normal appearance. He is not ill-appearing. Pulmonary:      Effort: Pulmonary effort is normal. No respiratory distress. Abdominal:      General: There is no distension. Tenderness: There is no abdominal tenderness. There is no right CVA tenderness or left CVA tenderness. Neurological:      Mental Status: He is alert and oriented to person, place, and time. Mental status is at baseline. Psychiatric:         Mood and Affect: Mood normal.         Behavior: Behavior normal.         DATA:  Results for orders placed or performed in visit on 08/16/21   POCT Urinalysis No Micro (Auto)   Result Value Ref Range    Color, UA yellow     Clarity, UA clear     Glucose, UA POC neg     Bilirubin, UA neg     Ketones, UA neg     Spec Grav, UA 1.015     Blood, UA POC neg     pH, UA 5.5     Protein, UA POC 30mg/dl; (A)     Urobilinogen, UA 0.2     Leukocytes, UA neg     Nitrite, UA neg        ASSESSMENT/PLAN  1. Benign prostatic hyperplasia with lower urinary tract symptoms, symptom details unspecified  Patient presents for evaluation of lower urinary tract symptoms. His CODY revealed a nonsuspicious 50 g prostate at his last appointment. He is currently maintained on Flomax and Myrbetriq. He reports the medication is no longer working as well. His most bothersome symptoms are irritative in nature. He empties his bladder well. He has requested an increase in his Myrbetriq.   I explained that I would be happy to increase him to 50 mg if he could get clearance from his nephrologist.  In the meantime, I explained that I could add an anticholinergic such as trospium, given that he is emptying his bladder well, to see if this combination therapy may be of help. I did discuss TURP versus Rezum and patient is not interested in surgery at this time. - SD Measure, post-void residual, US, non-imaging  - mirabegron (MYRBETRIQ) 25 MG TB24; Take 1 tablet by mouth daily  Dispense: 90 tablet; Refill: 3  - trospium (SANCTURA) 20 MG tablet; Take 1 tablet by mouth daily  Dispense: 90 tablet; Refill: 3    2. History of bladder cancer  History of bladder cancer. Will need follow-up cystoscopy in December with Dr. Beryl Trevino. - POCT Urinalysis No Micro (Auto)  - SD Measure, post-void residual, US, non-imaging      Orders Placed This Encounter   Procedures    POCT Urinalysis No Micro (Auto)    SD Measure, post-void residual, US, non-imaging        Return in about 3 months (around 11/16/2021) for with Dr. Beryl Trevino or Efren. An electronic signature was used to authenticate this note. ELISABETH TINSLEY - CNP    All information inputted into the note by the MA to include chief complaint, past medical history, past surgical history, medications, allergies, social and family history and review of systems has been reviewed and updated as needed by me. EMR Dragon/transcription disclaimer: Much of this document is electronic transcription/translation of spoken language to printed text. The electronic translation of spoken language may be erroneous or, at times, nonsensical words or phrases may be inadvertently transcribed.  Although I have reviewed the document for such errors, some may still exist.

## 2021-08-16 NOTE — PROGRESS NOTES
Bladder Scan interpretation  Estimation of residual urine via abdominal ultrasound  Residual Urine: 0 ml  Indication: urinary urgency and frequency  Position: Supine  Examination: Incremental scanning of the suprapubic area using 3 MHz transducer using copious amounts of acoustic gel. Findings: An anechoic area was demonstrated which represented the bladder, with measurement of residual urine as noted.

## 2021-08-17 ASSESSMENT — ENCOUNTER SYMPTOMS
VOMITING: 0
NAUSEA: 0
ABDOMINAL DISTENTION: 0
BACK PAIN: 0
ABDOMINAL PAIN: 0

## 2021-08-25 ENCOUNTER — APPOINTMENT (OUTPATIENT)
Dept: GENERAL RADIOLOGY | Age: 81
DRG: 639 | End: 2021-08-25
Payer: MEDICARE

## 2021-08-25 ENCOUNTER — HOSPITAL ENCOUNTER (INPATIENT)
Age: 81
LOS: 1 days | Discharge: HOME | DRG: 639 | End: 2021-08-28
Attending: EMERGENCY MEDICINE | Admitting: INTERNAL MEDICINE
Payer: MEDICARE

## 2021-08-25 DIAGNOSIS — E16.2 HYPOGLYCEMIA: Primary | ICD-10-CM

## 2021-08-25 DIAGNOSIS — K59.00 CONSTIPATION, UNSPECIFIED CONSTIPATION TYPE: ICD-10-CM

## 2021-08-25 DIAGNOSIS — S81.812A LACERATION OF MULTIPLE SITES OF LEFT LOWER EXTREMITY, INITIAL ENCOUNTER: ICD-10-CM

## 2021-08-25 PROBLEM — N17.9 ACUTE KIDNEY INJURY SUPERIMPOSED ON CKD (HCC): Status: ACTIVE | Noted: 2021-08-25

## 2021-08-25 PROBLEM — N18.9 ACUTE KIDNEY INJURY SUPERIMPOSED ON CKD (HCC): Status: ACTIVE | Noted: 2021-08-25

## 2021-08-25 LAB
ALBUMIN SERPL-MCNC: 4.3 G/DL (ref 3.5–5.2)
ALP BLD-CCNC: 114 U/L (ref 40–130)
ALT SERPL-CCNC: 22 U/L (ref 5–41)
ANION GAP SERPL CALCULATED.3IONS-SCNC: 16 MMOL/L (ref 7–19)
AST SERPL-CCNC: 27 U/L (ref 5–40)
BASOPHILS ABSOLUTE: 0.1 K/UL (ref 0–0.2)
BASOPHILS RELATIVE PERCENT: 0.8 % (ref 0–1)
BILIRUB SERPL-MCNC: 0.5 MG/DL (ref 0.2–1.2)
BUN BLDV-MCNC: 41 MG/DL (ref 8–23)
CALCIUM SERPL-MCNC: 9.9 MG/DL (ref 8.8–10.2)
CHLORIDE BLD-SCNC: 93 MMOL/L (ref 98–111)
CO2: 29 MMOL/L (ref 22–29)
CREAT SERPL-MCNC: 3.3 MG/DL (ref 0.5–1.2)
EOSINOPHILS ABSOLUTE: 0 K/UL (ref 0–0.6)
EOSINOPHILS RELATIVE PERCENT: 0 % (ref 0–5)
GFR AFRICAN AMERICAN: 22
GFR NON-AFRICAN AMERICAN: 18
GLUCOSE BLD-MCNC: 107 MG/DL (ref 70–99)
GLUCOSE BLD-MCNC: 57 MG/DL (ref 70–99)
GLUCOSE BLD-MCNC: 68 MG/DL (ref 74–109)
GLUCOSE BLD-MCNC: 86 MG/DL (ref 70–99)
HCT VFR BLD CALC: 41.2 % (ref 42–52)
HEMOGLOBIN: 13 G/DL (ref 14–18)
IMMATURE GRANULOCYTES #: 0.3 K/UL
LYMPHOCYTES ABSOLUTE: 1.4 K/UL (ref 1.1–4.5)
LYMPHOCYTES RELATIVE PERCENT: 11.8 % (ref 20–40)
MCH RBC QN AUTO: 29.1 PG (ref 27–31)
MCHC RBC AUTO-ENTMCNC: 31.6 G/DL (ref 33–37)
MCV RBC AUTO: 92.2 FL (ref 80–94)
MONOCYTES ABSOLUTE: 1 K/UL (ref 0–0.9)
MONOCYTES RELATIVE PERCENT: 8.2 % (ref 0–10)
NEUTROPHILS ABSOLUTE: 8.8 K/UL (ref 1.5–7.5)
NEUTROPHILS RELATIVE PERCENT: 76.7 % (ref 50–65)
PDW BLD-RTO: 13.8 % (ref 11.5–14.5)
PERFORMED ON: ABNORMAL
PERFORMED ON: ABNORMAL
PERFORMED ON: NORMAL
PLATELET # BLD: 182 K/UL (ref 130–400)
PMV BLD AUTO: 11.6 FL (ref 9.4–12.4)
POTASSIUM REFLEX MAGNESIUM: 4.1 MMOL/L (ref 3.5–5)
RBC # BLD: 4.47 M/UL (ref 4.7–6.1)
SARS-COV-2, NAAT: NOT DETECTED
SODIUM BLD-SCNC: 138 MMOL/L (ref 136–145)
TOTAL PROTEIN: 6.9 G/DL (ref 6.6–8.7)
TROPONIN: 0.03 NG/ML (ref 0–0.03)
WBC # BLD: 11.5 K/UL (ref 4.8–10.8)

## 2021-08-25 PROCEDURE — 87635 SARS-COV-2 COVID-19 AMP PRB: CPT

## 2021-08-25 PROCEDURE — 90471 IMMUNIZATION ADMIN: CPT | Performed by: EMERGENCY MEDICINE

## 2021-08-25 PROCEDURE — 6360000002 HC RX W HCPCS: Performed by: EMERGENCY MEDICINE

## 2021-08-25 PROCEDURE — 2580000003 HC RX 258: Performed by: EMERGENCY MEDICINE

## 2021-08-25 PROCEDURE — 73590 X-RAY EXAM OF LOWER LEG: CPT

## 2021-08-25 PROCEDURE — 80053 COMPREHEN METABOLIC PANEL: CPT

## 2021-08-25 PROCEDURE — 2500000003 HC RX 250 WO HCPCS

## 2021-08-25 PROCEDURE — 96374 THER/PROPH/DIAG INJ IV PUSH: CPT

## 2021-08-25 PROCEDURE — G0378 HOSPITAL OBSERVATION PER HR: HCPCS

## 2021-08-25 PROCEDURE — 93005 ELECTROCARDIOGRAM TRACING: CPT

## 2021-08-25 PROCEDURE — 82947 ASSAY GLUCOSE BLOOD QUANT: CPT

## 2021-08-25 PROCEDURE — 99283 EMERGENCY DEPT VISIT LOW MDM: CPT

## 2021-08-25 PROCEDURE — 2580000003 HC RX 258: Performed by: NURSE PRACTITIONER

## 2021-08-25 PROCEDURE — 6360000002 HC RX W HCPCS: Performed by: NURSE PRACTITIONER

## 2021-08-25 PROCEDURE — 6370000000 HC RX 637 (ALT 250 FOR IP): Performed by: NURSE PRACTITIONER

## 2021-08-25 PROCEDURE — 36415 COLL VENOUS BLD VENIPUNCTURE: CPT

## 2021-08-25 PROCEDURE — 96372 THER/PROPH/DIAG INJ SC/IM: CPT

## 2021-08-25 PROCEDURE — 85025 COMPLETE CBC W/AUTO DIFF WBC: CPT

## 2021-08-25 PROCEDURE — 96361 HYDRATE IV INFUSION ADD-ON: CPT

## 2021-08-25 PROCEDURE — 12005 RPR S/N/A/GEN/TRK12.6-20.0CM: CPT

## 2021-08-25 PROCEDURE — 90715 TDAP VACCINE 7 YRS/> IM: CPT | Performed by: EMERGENCY MEDICINE

## 2021-08-25 PROCEDURE — 0HQLXZZ REPAIR LEFT LOWER LEG SKIN, EXTERNAL APPROACH: ICD-10-PCS | Performed by: EMERGENCY MEDICINE

## 2021-08-25 PROCEDURE — 84484 ASSAY OF TROPONIN QUANT: CPT

## 2021-08-25 RX ORDER — NIFEDIPINE 60 MG/1
60 TABLET, EXTENDED RELEASE ORAL DAILY
Status: DISCONTINUED | OUTPATIENT
Start: 2021-08-26 | End: 2021-08-28 | Stop reason: HOSPADM

## 2021-08-25 RX ORDER — TAMSULOSIN HYDROCHLORIDE 0.4 MG/1
0.4 CAPSULE ORAL 2 TIMES DAILY
Status: DISCONTINUED | OUTPATIENT
Start: 2021-08-25 | End: 2021-08-28 | Stop reason: HOSPADM

## 2021-08-25 RX ORDER — POTASSIUM CHLORIDE 20 MEQ/1
40 TABLET, EXTENDED RELEASE ORAL PRN
Status: DISCONTINUED | OUTPATIENT
Start: 2021-08-25 | End: 2021-08-28 | Stop reason: HOSPADM

## 2021-08-25 RX ORDER — CALCIUM CARBONATE 200(500)MG
500 TABLET,CHEWABLE ORAL 3 TIMES DAILY PRN
Status: DISCONTINUED | OUTPATIENT
Start: 2021-08-25 | End: 2021-08-28 | Stop reason: HOSPADM

## 2021-08-25 RX ORDER — ACETAMINOPHEN 650 MG/1
650 SUPPOSITORY RECTAL EVERY 6 HOURS PRN
Status: DISCONTINUED | OUTPATIENT
Start: 2021-08-25 | End: 2021-08-28 | Stop reason: HOSPADM

## 2021-08-25 RX ORDER — MAGNESIUM SULFATE IN WATER 40 MG/ML
2000 INJECTION, SOLUTION INTRAVENOUS PRN
Status: DISCONTINUED | OUTPATIENT
Start: 2021-08-25 | End: 2021-08-28 | Stop reason: HOSPADM

## 2021-08-25 RX ORDER — POTASSIUM CHLORIDE 7.45 MG/ML
10 INJECTION INTRAVENOUS PRN
Status: DISCONTINUED | OUTPATIENT
Start: 2021-08-25 | End: 2021-08-28 | Stop reason: HOSPADM

## 2021-08-25 RX ORDER — TROSPIUM CHLORIDE 20 MG/1
20 TABLET, FILM COATED ORAL NIGHTLY
Refills: 3 | Status: DISCONTINUED | OUTPATIENT
Start: 2021-08-25 | End: 2021-08-28 | Stop reason: HOSPADM

## 2021-08-25 RX ORDER — HEPARIN SODIUM 5000 [USP'U]/ML
5000 INJECTION, SOLUTION INTRAVENOUS; SUBCUTANEOUS EVERY 8 HOURS SCHEDULED
Status: DISCONTINUED | OUTPATIENT
Start: 2021-08-25 | End: 2021-08-27

## 2021-08-25 RX ORDER — DEXTROSE MONOHYDRATE 25 G/50ML
INJECTION, SOLUTION INTRAVENOUS
Status: COMPLETED
Start: 2021-08-25 | End: 2021-08-25

## 2021-08-25 RX ORDER — ONDANSETRON 4 MG/1
4 TABLET, ORALLY DISINTEGRATING ORAL EVERY 8 HOURS PRN
Status: DISCONTINUED | OUTPATIENT
Start: 2021-08-25 | End: 2021-08-28 | Stop reason: HOSPADM

## 2021-08-25 RX ORDER — PANTOPRAZOLE SODIUM 40 MG/1
40 TABLET, DELAYED RELEASE ORAL DAILY
Status: DISCONTINUED | OUTPATIENT
Start: 2021-08-26 | End: 2021-08-28 | Stop reason: HOSPADM

## 2021-08-25 RX ORDER — DEXTROSE MONOHYDRATE 50 MG/ML
100 INJECTION, SOLUTION INTRAVENOUS PRN
Status: DISCONTINUED | OUTPATIENT
Start: 2021-08-25 | End: 2021-08-28 | Stop reason: HOSPADM

## 2021-08-25 RX ORDER — SODIUM CHLORIDE 0.9 % (FLUSH) 0.9 %
5-40 SYRINGE (ML) INJECTION PRN
Status: DISCONTINUED | OUTPATIENT
Start: 2021-08-25 | End: 2021-08-28 | Stop reason: HOSPADM

## 2021-08-25 RX ORDER — ATORVASTATIN CALCIUM 40 MG/1
20 TABLET, FILM COATED ORAL DAILY
Status: DISCONTINUED | OUTPATIENT
Start: 2021-08-26 | End: 2021-08-28 | Stop reason: HOSPADM

## 2021-08-25 RX ORDER — FOLIC ACID 1 MG/1
1 TABLET ORAL DAILY
Status: DISCONTINUED | OUTPATIENT
Start: 2021-08-26 | End: 2021-08-28 | Stop reason: HOSPADM

## 2021-08-25 RX ORDER — ASPIRIN 81 MG/1
81 TABLET ORAL DAILY
Status: DISCONTINUED | OUTPATIENT
Start: 2021-08-26 | End: 2021-08-28 | Stop reason: HOSPADM

## 2021-08-25 RX ORDER — SODIUM CHLORIDE 0.9 % (FLUSH) 0.9 %
5-40 SYRINGE (ML) INJECTION EVERY 12 HOURS SCHEDULED
Status: DISCONTINUED | OUTPATIENT
Start: 2021-08-25 | End: 2021-08-28 | Stop reason: HOSPADM

## 2021-08-25 RX ORDER — BISACODYL 10 MG
10 SUPPOSITORY, RECTAL RECTAL DAILY PRN
Status: DISCONTINUED | OUTPATIENT
Start: 2021-08-25 | End: 2021-08-28 | Stop reason: HOSPADM

## 2021-08-25 RX ORDER — ONDANSETRON 2 MG/ML
4 INJECTION INTRAMUSCULAR; INTRAVENOUS EVERY 6 HOURS PRN
Status: DISCONTINUED | OUTPATIENT
Start: 2021-08-25 | End: 2021-08-28 | Stop reason: HOSPADM

## 2021-08-25 RX ORDER — SODIUM CHLORIDE 9 MG/ML
INJECTION, SOLUTION INTRAVENOUS CONTINUOUS
Status: DISCONTINUED | OUTPATIENT
Start: 2021-08-25 | End: 2021-08-26

## 2021-08-25 RX ORDER — SENNA AND DOCUSATE SODIUM 50; 8.6 MG/1; MG/1
1 TABLET, FILM COATED ORAL 2 TIMES DAILY
Status: DISCONTINUED | OUTPATIENT
Start: 2021-08-25 | End: 2021-08-28 | Stop reason: HOSPADM

## 2021-08-25 RX ORDER — 0.9 % SODIUM CHLORIDE 0.9 %
1000 INTRAVENOUS SOLUTION INTRAVENOUS ONCE
Status: COMPLETED | OUTPATIENT
Start: 2021-08-25 | End: 2021-08-25

## 2021-08-25 RX ORDER — SODIUM CHLORIDE 9 MG/ML
25 INJECTION, SOLUTION INTRAVENOUS PRN
Status: DISCONTINUED | OUTPATIENT
Start: 2021-08-25 | End: 2021-08-28 | Stop reason: HOSPADM

## 2021-08-25 RX ORDER — AMIODARONE HYDROCHLORIDE 200 MG/1
200 TABLET ORAL DAILY
Status: DISCONTINUED | OUTPATIENT
Start: 2021-08-26 | End: 2021-08-28 | Stop reason: HOSPADM

## 2021-08-25 RX ORDER — ACETAMINOPHEN 325 MG/1
650 TABLET ORAL EVERY 6 HOURS PRN
Status: DISCONTINUED | OUTPATIENT
Start: 2021-08-25 | End: 2021-08-28 | Stop reason: HOSPADM

## 2021-08-25 RX ORDER — DEXTROSE MONOHYDRATE 25 G/50ML
12.5 INJECTION, SOLUTION INTRAVENOUS PRN
Status: DISCONTINUED | OUTPATIENT
Start: 2021-08-25 | End: 2021-08-28 | Stop reason: HOSPADM

## 2021-08-25 RX ORDER — METOPROLOL SUCCINATE 50 MG/1
100 TABLET, EXTENDED RELEASE ORAL DAILY
Status: DISCONTINUED | OUTPATIENT
Start: 2021-08-26 | End: 2021-08-28 | Stop reason: HOSPADM

## 2021-08-25 RX ORDER — NICOTINE POLACRILEX 4 MG
15 LOZENGE BUCCAL PRN
Status: DISCONTINUED | OUTPATIENT
Start: 2021-08-25 | End: 2021-08-28 | Stop reason: HOSPADM

## 2021-08-25 RX ADMIN — DEXTROSE MONOHYDRATE 25 G: 500 INJECTION PARENTERAL at 18:10

## 2021-08-25 RX ADMIN — TAMSULOSIN HYDROCHLORIDE 0.4 MG: 0.4 CAPSULE ORAL at 22:29

## 2021-08-25 RX ADMIN — SODIUM CHLORIDE: 9 INJECTION, SOLUTION INTRAVENOUS at 22:24

## 2021-08-25 RX ADMIN — SODIUM CHLORIDE, PRESERVATIVE FREE 10 ML: 5 INJECTION INTRAVENOUS at 22:24

## 2021-08-25 RX ADMIN — TROSPIUM CHLORIDE 20 MG: 20 TABLET, FILM COATED ORAL at 22:29

## 2021-08-25 RX ADMIN — SODIUM CHLORIDE 1000 ML: 9 INJECTION, SOLUTION INTRAVENOUS at 20:00

## 2021-08-25 RX ADMIN — TETANUS TOXOID, REDUCED DIPHTHERIA TOXOID AND ACELLULAR PERTUSSIS VACCINE, ADSORBED 0.5 ML: 5; 2.5; 8; 8; 2.5 SUSPENSION INTRAMUSCULAR at 20:37

## 2021-08-25 RX ADMIN — DOCUSATE SODIUM 50 MG AND SENNOSIDES 8.6 MG 1 TABLET: 8.6; 5 TABLET, FILM COATED ORAL at 22:29

## 2021-08-25 RX ADMIN — ANTACID TABLETS 500 MG: 500 TABLET, CHEWABLE ORAL at 22:29

## 2021-08-25 RX ADMIN — HEPARIN SODIUM 5000 UNITS: 5000 INJECTION INTRAVENOUS; SUBCUTANEOUS at 22:30

## 2021-08-25 ASSESSMENT — ENCOUNTER SYMPTOMS
RESPIRATORY NEGATIVE: 1
COLOR CHANGE: 0
EYES NEGATIVE: 1
VOMITING: 0
SHORTNESS OF BREATH: 0
DIARRHEA: 0
ABDOMINAL DISTENTION: 0
CONSTIPATION: 1
ABDOMINAL PAIN: 0
BLOOD IN STOOL: 0
COUGH: 0
WHEEZING: 0
NAUSEA: 1

## 2021-08-25 NOTE — ED PROVIDER NOTES
[facility] [address]  eMERGENCY dEPARTMENT eNCOUnter      Pt Name: Delano Lopez  MRN: [id number]  [place] 1940  Date of evaluation: 8/25/2021  Provider: Lelon Olszewski, [address] [place] [address]       Chief Complaint   Patient presents with    Hypoglycemia     intial BG with EMS 54, oral glucose raised to 61, tremors         HISTORY OF PRESENT ILLNESS   (Location/Symptom, Timing/Onset,Context/Setting, Quality, Duration, Modifying Factors, Severity)  Note limiting factors. Delano Lopez is a [de-identified] y.o. male who presents to the emergency department patient states he is been suffering or struggling with low blood sugar for multiple days. He was advised by his primary care physician to cut his glyburide in half. He states this morning his glucose was in the 50s and he decided not to take his medication. He states he has had been having frequent bouts of flushing and cold temperature type changes with his fluctuating glucose levels. He states he is had several situations where he is almost passed out or had a \"\" tremor and seizure with low glucose. This afternoon he suffered an over event as well with low glucose was found to be at 47 when EMS arrived during this episode he actually injured his lower extremity on his recliner and has multiple skin tears and lacerations about the anterior portion of the left lower extremity. Currently the patient is alert and oriented x3 really no acute distress at this time and is engaging with conversation with staff. HPI    NursingNotes were reviewed. REVIEW OF SYSTEMS    (2-9 systems for level 4, 10 or more for level 5)     Review of Systems   Constitutional: Positive for appetite change. HENT: Negative. Eyes: Negative. Respiratory: Negative. Cardiovascular: Negative. Gastrointestinal: Positive for constipation. Endocrine:        Low glucose levels   Genitourinary: Negative. Skin: Positive for wound.    Neurological: Positive for tremors and syncope. Psychiatric/Behavioral: Negative. All other systems reviewed and are negative. PAST MEDICALHISTORY     Past Medical History:   Diagnosis Date    Acute liver failure without hepatic coma 10/23/2018    Back pain     \"with tired legs as a result\"    Bladder cancer ([facility] 12/19/2018    Blood circulation, collateral     Carotid arterial disease (HCC)     recent surgery    CKD (chronic kidney disease), 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/2018    COPD with acute lower respiratory infection ([facility] 2/24/2021    GERD (gastroesophageal reflux disease)     Hyperlipidemia     Hypertension     Hypertension     Palliative care patient 10/23/2018    Pneumonia due to infectious organism 11/06/2018    Primary osteoarthritis of left knee 10/14/2018    PVD (peripheral vascular disease) (HCC)     Tremor     Tremor on Right side x 1-2 weeks per stepdaughter    Type 2 diabetes mellitus with complication, without long-term current use of insulin ([facility] 1/21/2021         SURGICAL HISTORY       Past Surgical History:   Procedure Laterality Date    BACK SURGERY      CARDIAC CATHETERIZATION  03/23/2021    100% RCA    COLONOSCOPY  2007?     CYSTOSCOPY Bilateral 12/19/2018    CYSTOSCOPY, BIOSPY FULGURATION OF BLADDER TUMOR POSSIBLE TURBT, RETROGRADE PYELOGRAM performed by Sera Garcia MD at [facility] COLONOSCOPY FLX DX W/COLLJ SPEC WHEN PFRMD N/A 09/11/2017    Dr Blayne Qureshi internal hemorrhoids, diverticular disease-HP-No recall (age)   Selina Moore CA REVISE MEDIAN N/CARPAL TUNNEL SURG Left 07/18/2018    OPEN CARPAL TUNNEL RELEASE performed by Darek Bynum MD at [address] Left 08/28/2018    LEFT CAROTID ENDARTERECTOMY WITH VEIN PATCH ANGIOPLASTY AND COMPLETION ANGIOGRAM performed by Tia Davalos MD at [place] [address] Left 10/15/2018    LEFT COMPLEX TOTAL KNEE ARTHROPLASTY performed by Darek Bynum MD at [facility] VASCULAR SURGERY  04/21/2015    Lily BESS Ultrasound guided access of left common femoral artery. Aortogram.Diagnostic right lower extremity arteriogram.Radiologic supervision and interpretation.  VASCULAR SURGERY  01/13/2015    Lily Rodriguez M.D Atherectomy,angioplasty,and stenting of left superficial femoral artery.  VASCULAR SURGERY  03/11/2014    Lily Rodriguez M.D. Ultrasound-guided access of right common femoral artery. Aortogram.Left lower extremity arteriogram.Atherectomy and angioplasty of left superficial femoral artery. Radiologic supervision and interpretation.  VASCULAR SURGERY  01/18/2013    Lily LIU. Aortogram.Multistation arteriogram right lower extremity. Laser atherectomy and angioplasty of right superficial femoral artery. Selective catheterization of right tibioperoneal trunk. Angioplasty of peroneal artery and tibioperoneal trunk.  VASCULAR SURGERY  10/30/2018    SJS. Ultrasound guided cannulation of right internal vein. Placement of right internal jugular vein tunneled dialysis catheter bard equistream xk 23cm tip to cuff    VASCULAR SURGERY  12/17/2018    SJS. Removal of tunneled dilaysis catheter right internal jugular vein.          CURRENT MEDICATIONS     Discharge Medication List as of 8/28/2021  1:33 PM      CONTINUE these medications which have NOT CHANGED    Details   mirabegron (MYRBETRIQ) 25 MG TB24 Take 1 tablet by mouth daily, Disp-90 tablet, R-3Normal      trospium (SANCTURA) 20 MG tablet Take 1 tablet by mouth daily, Disp-90 tablet, R-3Normal      amiodarone (CORDARONE) 200 MG tablet Take 1 tablet by mouth daily, Disp-30 tablet, R-3Normal      aspirin 81 MG EC tablet Take 1 tablet by mouth daily, Disp-30 tablet, R-3Normal      atorvastatin (LIPITOR) 20 MG tablet Take 1 tablet by mouth daily, Disp-30 tablet, R-0Normal      metoprolol succinate (TOPROL XL) 100 MG extended release tablet Take 1 tablet by mouth daily, Disp-30 tablet, R-3Normal      NIFEdipine (ADALAT CC) 60 MG extended release tablet Take 1 tablet by mouth daily, Disp-30 tablet, R-3Normal      ferrous sulfate (IRON 325) 325 (65 Fe) MG tablet Take 1 tablet by mouth 2 times daily (with meals), Disp-30 tablet, [id number]      folic acid (FOLVITE) 1 MG tablet Take 1 tablet by mouth daily, Disp-30 tablet, R-3Normal      bisacodyl (DULCOLAX) 5 MG EC tablet Take 1 tablet by mouth daily as needed for Constipation, Disp-30 tablet, R-0Normal      tamsulosin (FLOMAX) 0.4 MG capsule Take 1 capsule by mouth 2 times daily, Disp-60 capsule, R-3Normal      pantoprazole (PROTONIX) 40 MG tablet Take 1 tablet by mouth daily, Disp-30 tablet, R-3Normal      vitamin D (ERGOCALCIFEROL) 1.25 MG (32787 UT) CAPS capsule Take 1 capsule by mouth once a week, Disp-5 capsule, R-0Normal      guaiFENesin (ROBITUSSIN) 100 MG/5ML syrup Take 10 mLs by mouth every 4 hours as needed for Cough, Disp-118 mL, R-0Normal      miconazole (MICOTIN) 2 % powder Apply topically 2 times daily. , Disp-45 g, R-1, Normal      linaclotide (LINZESS) 145 MCG capsule Take 1 capsule by mouth every morning (before breakfast), Disp-30 capsule, R-0Normal      OXYGEN Inhale 2 L into the lungs continuous, Disp-1 Container, R-5Normal             ALLERGIES     Eliquis [apixaban] and Promethazine hcl    FAMILY HISTORY       Family History   Problem Relation Age of Onset    Colon Cancer Father     Diabetes Brother     Colon Polyps Neg Hx     Liver Cancer Neg Hx     Liver Disease Neg Hx     Esophageal Cancer Neg Hx     Rectal Cancer Neg Hx     Stomach Cancer Neg Hx           SOCIAL HISTORY       Social History     Socioeconomic History    Marital status:      Spouse name: None    Number of children: None    Years of education: None    Highest education level: None   Occupational History    None   Tobacco Use    Smoking status: Former Smoker     Quit date: 6/3/2003     Years since quittin.3    Smokeless tobacco: Never Used   Vaping Use    Vaping Use: Never used   Substance and Sexual Activity    Alcohol use: Yes     Alcohol/week: 12.0 standard drinks     Types: 12 Glasses of wine per week     Comment: 2 glasses of wine every night    Drug use: No    Sexual activity: Yes     Partners: Female   Other Topics Concern    None   Social History Narrative    None     Social Determinants of Health     Financial Resource Strain:     Difficulty of Paying Living Expenses:    Food Insecurity:     Worried About Running Out of Food in the Last Year:     Ran Out of Food in the Last Year:    Transportation Needs:     Lack of Transportation (Medical):  Lack of Transportation (Non-Medical):    Physical Activity:     Days of Exercise per Week:     Minutes of Exercise per Session:    Stress:     Feeling of Stress :    Social Connections:     Frequency of Communication with Friends and Family:     Frequency of Social Gatherings with Friends and Family:     Attends [employer]ism Services:     Active Member of Clubs or Organizations:     Attends Club or Organization Meetings:     Marital Status:    Intimate Partner Violence:     Fear of Current or Ex-Partner:     Emotionally Abused:     Physically Abused:     Sexually Abused:        SCREENINGS    [place] Coma Scale  Eye Opening: Spontaneous  Best Verbal Response: Oriented  Best Motor Response: Obeys commands  [place] Coma Scale Score: 15        PHYSICAL EXAM    (up to 7 for level 4, 8 or more for level 5)     ED Triage Vitals   BP Temp Temp src Pulse Resp SpO2 Height Weight   -- -- -- -- -- -- -- --       Physical Exam  Vitals and nursing note reviewed. HENT:      Head: Normocephalic and atraumatic. Nose: Nose normal.   Eyes:      Pupils: Pupils are equal, round, and reactive to light. Cardiovascular:      Rate and Rhythm: Normal rate and regular rhythm. Heart sounds: Normal heart sounds. Pulmonary:      Effort: Pulmonary effort is normal.      Breath sounds: Normal breath sounds.    Abdominal: GFR Non- 18 (*)     GFR  22 (*)     All other components within normal limits   COMPREHENSIVE METABOLIC PANEL W/ REFLEX TO MG FOR LOW K - Abnormal; Notable for the following components:    Chloride 94 (*)     CO2 32 (*)     Glucose 139 (*)     BUN 42 (*)     CREATININE 3.5 (*)     GFR Non- 17 (*)     GFR African American 20 (*)     Calcium 8.7 (*)     Total Protein 5.1 (*)     All other components within normal limits   CBC WITH AUTO DIFFERENTIAL - Abnormal; Notable for the following components:    RBC 3.62 (*)     Hemoglobin 10.4 (*)     Hematocrit 33.2 (*)     MCHC 31.3 (*)     Neutrophils % 73.2 (*)     Lymphocytes % 15.1 (*)     All other components within normal limits   CBC WITH AUTO DIFFERENTIAL - Abnormal; Notable for the following components:    RBC 3.27 (*)     Hemoglobin 9.6 (*)     Hematocrit 30.5 (*)     MCHC 31.5 (*)     Platelets 115 (*)     Basophils % 1.2 (*)     All other components within normal limits   HEMOGLOBIN A1C - Abnormal; Notable for the following components:    Hemoglobin A1C 6.3 (*)     All other components within normal limits   COMPREHENSIVE METABOLIC PANEL - Abnormal; Notable for the following components:    Glucose 147 (*)     BUN 39 (*)     CREATININE 3.1 (*)     GFR Non- 19 (*)     GFR  24 (*)     Calcium 8.1 (*)     Total Protein 4.9 (*)     Albumin 3.2 (*)     All other components within normal limits   CBC WITH AUTO DIFFERENTIAL - Abnormal; Notable for the following components:    RBC 3.16 (*)     Hemoglobin 9.6 (*)     Hematocrit 29.8 (*)     MCV 94.3 (*)     MCHC 32.2 (*)     Platelets 061 (*)     Neutrophils % 67.0 (*)     Polychromasia 1+ (*)     Ovalocytes Occasional (*)     All other components within normal limits   COMPREHENSIVE METABOLIC PANEL - Abnormal; Notable for the following components:    Glucose 171 (*)     BUN 36 (*)     CREATININE 2.7 (*)     GFR Non- 23 (*) GFR  28 (*)     Calcium 8.0 (*)     Total Protein 5.3 (*)     Albumin 3.2 (*)     All other components within normal limits   POCT GLUCOSE - Abnormal; Notable for the following components:    POC Glucose 57 (*)     All other components within normal limits   POCT GLUCOSE - Abnormal; Notable for the following components:    POC Glucose 107 (*)     All other components within normal limits   POCT GLUCOSE - Abnormal; Notable for the following components:    POC Glucose 123 (*)     All other components within normal limits   POCT GLUCOSE - Abnormal; Notable for the following components:    POC Glucose 138 (*)     All other components within normal limits   POCT GLUCOSE - Abnormal; Notable for the following components:    POC Glucose 139 (*)     All other components within normal limits   POCT GLUCOSE - Abnormal; Notable for the following components:    POC Glucose 176 (*)     All other components within normal limits   POCT GLUCOSE - Abnormal; Notable for the following components:    POC Glucose 262 (*)     All other components within normal limits   POCT GLUCOSE - Abnormal; Notable for the following components:    POC Glucose 166 (*)     All other components within normal limits   POCT GLUCOSE - Abnormal; Notable for the following components:    POC Glucose 179 (*)     All other components within normal limits   COVID-19, RAPID   TROPONIN   CK    Narrative:     Collection has been rescheduled by [facility] at 08/26/2021 15:51 Reason: Add   on per ROSA Banks    SODIUM, URINE, RANDOM   PTH, INTACT    Narrative:     Collection has been rescheduled by [facility] at 08/26/2021 15:51 Reason: Add   on per ROSA Banks    CREATININE, RANDOM URINE   COMPREHENSIVE METABOLIC PANEL W/ REFLEX TO MG FOR LOW K   COMPREHENSIVE METABOLIC PANEL W/ REFLEX TO MG FOR LOW K   POCT GLUCOSE   POCT GLUCOSE   POCT GLUCOSE   POCT GLUCOSE       All other labs were within normal range or not returned as of this dictation.     EMERGENCY DEPARTMENT COURSE and DIFFERENTIAL DIAGNOSIS/MDM:   Vitals:    Vitals:    08/28/21 0030 08/28/21 0354 08/28/21 0654 08/28/21 1037   BP: (!) 128/55 (!) 133/54 131/60 132/65   Pulse: 59 63 61 58   Resp: 18 18 20 20   Temp: 97.9 °F (36.6 °C) 97.7 °F (36.5 °C) 97.3 °F (36.3 °C) 97.2 °F (36.2 °C)   TempSrc: Temporal Temporal Temporal Temporal   SpO2: 94% 94% 96% 95%   Weight:       Height:           MDM    Reassessment      CONSULTS:  IP CONSULT TO NEPHROLOGY  IP CONSULT TO HOME CARE NEEDS    :  Unless otherwise noted below, none     Lac Repair    Date/Time: 8/25/2021 6:29 PM  Performed by: Oskar Harrington DO  Authorized by: Oskar Harrington DO     Consent:     Consent obtained:  Verbal    Consent given by:  Patient    Risks discussed:  Infection, pain, poor cosmetic result, poor wound healing, nerve damage and need for additional repair    Alternatives discussed:  No treatment  Anesthesia (see MAR for exact dosages): Anesthesia method:  None  Laceration details:     Location:  Leg    Leg location:  L lower leg    Length (cm):  8    Depth (mm):  0.5  Exploration:     Hemostasis achieved with:  Direct pressure    Wound exploration: entire depth of wound probed and visualized      Wound extent: areolar tissue violated      Contaminated: no    Treatment:     Area cleansed with:  Saline    Amount of cleaning:  Standard    Irrigation solution:  Sterile saline    Visualized foreign bodies/material removed: no    Skin repair:     Repair method:  Staples    Number of staples:  7  Approximation:     Approximation:  Close  Post-procedure details:     Dressing:  Non-adherent dressing    Patient tolerance of procedure: Tolerated well, no immediate complications  Comments:      States he had no feeling around the area on the anterior flap/skin tears and we did a test with a 1 staple and patient tolerated completely fine and no complaints of excessive pain or any excessive discomfort.   He agreed to continue on with the procedure [facility], [address] [facility]  [phone]    Schedule an appointment as soon as possible for a visit in 1 week  Follow-up, Suture removal and wound care check       DISCHARGE MEDICATIONS:  Discharge Medication List as of 8/28/2021  1:33 PM      START taking these medications    Details   sennosides-docusate sodium (SENOKOT-S) 8.6-50 MG tablet Take 1 tablet by mouth 2 times daily, Disp-60 tablet, R-0Normal                (Please note that portions of this note were completed with a voice recognition program.  Efforts were made to edit thedictations but occasionally words are mis-transcribed.)    Lelon Olszewski, DO (electronically signed)Emergency Physician         Lelon Olszewski, DO  08/25/21 2190 Isabel Alvarez DO  10/05/21 1140

## 2021-08-25 NOTE — ED NOTES
Bed: 11  Expected date:   Expected time:   Means of arrival:   Comments:  Pari Munoz, RN  08/25/21 2286

## 2021-08-26 ENCOUNTER — APPOINTMENT (OUTPATIENT)
Dept: ULTRASOUND IMAGING | Age: 81
DRG: 639 | End: 2021-08-26
Payer: MEDICARE

## 2021-08-26 ENCOUNTER — APPOINTMENT (OUTPATIENT)
Dept: GENERAL RADIOLOGY | Age: 81
DRG: 639 | End: 2021-08-26
Payer: MEDICARE

## 2021-08-26 LAB
ALBUMIN SERPL-MCNC: 3.5 G/DL (ref 3.5–5.2)
ALP BLD-CCNC: 99 U/L (ref 40–130)
ALT SERPL-CCNC: 18 U/L (ref 5–41)
ANION GAP SERPL CALCULATED.3IONS-SCNC: 12 MMOL/L (ref 7–19)
AST SERPL-CCNC: 19 U/L (ref 5–40)
BASOPHILS ABSOLUTE: 0.1 K/UL (ref 0–0.2)
BASOPHILS RELATIVE PERCENT: 0.8 % (ref 0–1)
BILIRUB SERPL-MCNC: 0.4 MG/DL (ref 0.2–1.2)
BUN BLDV-MCNC: 42 MG/DL (ref 8–23)
CALCIUM SERPL-MCNC: 8.7 MG/DL (ref 8.8–10.2)
CHLORIDE BLD-SCNC: 94 MMOL/L (ref 98–111)
CO2: 32 MMOL/L (ref 22–29)
CREAT SERPL-MCNC: 3.5 MG/DL (ref 0.5–1.2)
CREATININE URINE: 101.5 MG/DL (ref 4.2–622)
EOSINOPHILS ABSOLUTE: 0 K/UL (ref 0–0.6)
EOSINOPHILS RELATIVE PERCENT: 0 % (ref 0–5)
GFR AFRICAN AMERICAN: 20
GFR NON-AFRICAN AMERICAN: 17
GLUCOSE BLD-MCNC: 123 MG/DL (ref 70–99)
GLUCOSE BLD-MCNC: 138 MG/DL (ref 70–99)
GLUCOSE BLD-MCNC: 139 MG/DL (ref 74–109)
GLUCOSE BLD-MCNC: 83 MG/DL (ref 70–99)
GLUCOSE BLD-MCNC: 99 MG/DL (ref 70–99)
HCT VFR BLD CALC: 33.2 % (ref 42–52)
HEMOGLOBIN: 10.4 G/DL (ref 14–18)
IMMATURE GRANULOCYTES #: 0.2 K/UL
LYMPHOCYTES ABSOLUTE: 1.3 K/UL (ref 1.1–4.5)
LYMPHOCYTES RELATIVE PERCENT: 15.1 % (ref 20–40)
MCH RBC QN AUTO: 28.7 PG (ref 27–31)
MCHC RBC AUTO-ENTMCNC: 31.3 G/DL (ref 33–37)
MCV RBC AUTO: 91.7 FL (ref 80–94)
MONOCYTES ABSOLUTE: 0.7 K/UL (ref 0–0.9)
MONOCYTES RELATIVE PERCENT: 8.8 % (ref 0–10)
NEUTROPHILS ABSOLUTE: 6.1 K/UL (ref 1.5–7.5)
NEUTROPHILS RELATIVE PERCENT: 73.2 % (ref 50–65)
PARATHYROID HORMONE INTACT: 48 PG/ML (ref 15–65)
PDW BLD-RTO: 13.7 % (ref 11.5–14.5)
PERFORMED ON: ABNORMAL
PERFORMED ON: ABNORMAL
PERFORMED ON: NORMAL
PERFORMED ON: NORMAL
PLATELET # BLD: 138 K/UL (ref 130–400)
PMV BLD AUTO: 11.7 FL (ref 9.4–12.4)
POTASSIUM REFLEX MAGNESIUM: 4 MMOL/L (ref 3.5–5)
RBC # BLD: 3.62 M/UL (ref 4.7–6.1)
SODIUM BLD-SCNC: 138 MMOL/L (ref 136–145)
SODIUM URINE: 34 MMOL/L
TOTAL CK: 56 U/L (ref 39–308)
TOTAL PROTEIN: 5.1 G/DL (ref 6.6–8.7)
WBC # BLD: 8.3 K/UL (ref 4.8–10.8)

## 2021-08-26 PROCEDURE — 82947 ASSAY GLUCOSE BLOOD QUANT: CPT

## 2021-08-26 PROCEDURE — 96361 HYDRATE IV INFUSION ADD-ON: CPT

## 2021-08-26 PROCEDURE — 6370000000 HC RX 637 (ALT 250 FOR IP): Performed by: NURSE PRACTITIONER

## 2021-08-26 PROCEDURE — 36415 COLL VENOUS BLD VENIPUNCTURE: CPT

## 2021-08-26 PROCEDURE — G0378 HOSPITAL OBSERVATION PER HR: HCPCS

## 2021-08-26 PROCEDURE — 76770 US EXAM ABDO BACK WALL COMP: CPT

## 2021-08-26 PROCEDURE — 6360000002 HC RX W HCPCS: Performed by: NURSE PRACTITIONER

## 2021-08-26 PROCEDURE — 80053 COMPREHEN METABOLIC PANEL: CPT

## 2021-08-26 PROCEDURE — 83970 ASSAY OF PARATHORMONE: CPT

## 2021-08-26 PROCEDURE — 2580000003 HC RX 258: Performed by: NURSE PRACTITIONER

## 2021-08-26 PROCEDURE — 85025 COMPLETE CBC W/AUTO DIFF WBC: CPT

## 2021-08-26 PROCEDURE — 96372 THER/PROPH/DIAG INJ SC/IM: CPT

## 2021-08-26 PROCEDURE — 71045 X-RAY EXAM CHEST 1 VIEW: CPT

## 2021-08-26 PROCEDURE — 84300 ASSAY OF URINE SODIUM: CPT

## 2021-08-26 PROCEDURE — 82570 ASSAY OF URINE CREATININE: CPT

## 2021-08-26 PROCEDURE — 82550 ASSAY OF CK (CPK): CPT

## 2021-08-26 RX ORDER — SODIUM CHLORIDE 9 MG/ML
INJECTION, SOLUTION INTRAVENOUS CONTINUOUS
Status: DISCONTINUED | OUTPATIENT
Start: 2021-08-26 | End: 2021-08-28 | Stop reason: HOSPADM

## 2021-08-26 RX ADMIN — PANTOPRAZOLE SODIUM 40 MG: 40 TABLET, DELAYED RELEASE ORAL at 07:52

## 2021-08-26 RX ADMIN — FOLIC ACID 1 MG: 1 TABLET ORAL at 07:51

## 2021-08-26 RX ADMIN — HEPARIN SODIUM 5000 UNITS: 5000 INJECTION INTRAVENOUS; SUBCUTANEOUS at 14:16

## 2021-08-26 RX ADMIN — HEPARIN SODIUM 5000 UNITS: 5000 INJECTION INTRAVENOUS; SUBCUTANEOUS at 23:49

## 2021-08-26 RX ADMIN — HEPARIN SODIUM 5000 UNITS: 5000 INJECTION INTRAVENOUS; SUBCUTANEOUS at 07:59

## 2021-08-26 RX ADMIN — DOCUSATE SODIUM 50 MG AND SENNOSIDES 8.6 MG 1 TABLET: 8.6; 5 TABLET, FILM COATED ORAL at 07:51

## 2021-08-26 RX ADMIN — ATORVASTATIN CALCIUM 20 MG: 40 TABLET, FILM COATED ORAL at 07:51

## 2021-08-26 RX ADMIN — AMIODARONE HYDROCHLORIDE 200 MG: 200 TABLET ORAL at 07:52

## 2021-08-26 RX ADMIN — ANTACID TABLETS 500 MG: 500 TABLET, CHEWABLE ORAL at 14:18

## 2021-08-26 RX ADMIN — TROSPIUM CHLORIDE 20 MG: 20 TABLET, FILM COATED ORAL at 20:36

## 2021-08-26 RX ADMIN — TAMSULOSIN HYDROCHLORIDE 0.4 MG: 0.4 CAPSULE ORAL at 20:36

## 2021-08-26 RX ADMIN — METOPROLOL SUCCINATE 100 MG: 50 TABLET, EXTENDED RELEASE ORAL at 07:51

## 2021-08-26 RX ADMIN — SODIUM CHLORIDE, PRESERVATIVE FREE 10 ML: 5 INJECTION INTRAVENOUS at 07:50

## 2021-08-26 RX ADMIN — NIFEDIPINE 60 MG: 60 TABLET, EXTENDED RELEASE ORAL at 07:51

## 2021-08-26 RX ADMIN — ASPIRIN 81 MG: 81 TABLET, COATED ORAL at 07:51

## 2021-08-26 RX ADMIN — DOCUSATE SODIUM 50 MG AND SENNOSIDES 8.6 MG 1 TABLET: 8.6; 5 TABLET, FILM COATED ORAL at 20:36

## 2021-08-26 RX ADMIN — TAMSULOSIN HYDROCHLORIDE 0.4 MG: 0.4 CAPSULE ORAL at 07:51

## 2021-08-26 RX ADMIN — ANTACID TABLETS 500 MG: 500 TABLET, CHEWABLE ORAL at 07:51

## 2021-08-26 RX ADMIN — LINACLOTIDE 145 MCG: 145 CAPSULE, GELATIN COATED ORAL at 07:51

## 2021-08-26 ASSESSMENT — ENCOUNTER SYMPTOMS
NAUSEA: 0
DIARRHEA: 0
COUGH: 0
ABDOMINAL DISTENTION: 0
ABDOMINAL PAIN: 0
CONSTIPATION: 0
WHEEZING: 0
VOMITING: 0
BLOOD IN STOOL: 0
SHORTNESS OF BREATH: 0
COLOR CHANGE: 0

## 2021-08-26 ASSESSMENT — PAIN SCALES - GENERAL
PAINLEVEL_OUTOF10: 0
PAINLEVEL_OUTOF10: 0

## 2021-08-26 NOTE — CARE COORDINATION
Date / Time of Evaluation: 8/26/2021 8:57 AM  Assessment Completed by: Karen Alford    Patient Admission Status: Observation [104]    [address] [facility]    [phone] (home)   Telephone Information:   Mobile [phone]       (Best Practice:  Have patient / caregiver verify above address and phone number by stating out loud their current address and reachable phone number.)  Is above information correct? yes       Current PCP:  Ashley Riggins MD  PCP verified? yes    Initial Assessment Completed at bedside with:  yes    Emergency Contacts:  Extended Emergency Contact Information  [facility] [address] [facility] Phone: [phone]  Relation: Child  Secondary Emergency Contact: Jorgito Garcíawell  Address: [address] [place] [address] [address] [facility] Phone: [phone]  Mobile Phone: [phone]  Relation: Spouse    Advance Directives: Code Status:  Full Code    Financial:  Payor: MEDICARE / Plan: [employer] MEDICARE / Product Type: *No Product type* /     Pre-Cert required for SNF:  no    Have Long Term Care Insurance:  no    Pharmacy:   [place] [id number] [id number] [facility], [address] [phone] - F [phone]  [address] [address]  Phone: [phone] Fax: [phone]    [address] - P [phone] - Stephany Lagos [facility] [address]  Phone: [phone] Fax: [phone]      Potential assistance purchasing medications?   no    ADLS:  Support System:  Family support    Current Home Environment:  Lives with spouse    Plans to RETURN to current housing:  yes  Barriers to RETURNING to current housing:  none    Currently ACTIVE with Home Health CARE: no, had in past  [address]:  [facility]    Has a pulse oximetry unit at home: no    Had [facility] prior to admission:  No, states he wore CPAP at night    [address]:  [facility]  Informed of need to bring portable home O2 tank to hospital on day of DISCHARGE:  Not needed  Name of person committed to bringing portable tank at discharge:  N/a     Active with HD/PD prior to admission: no  Nephrologist:  [address]:  [address] for Discharge:  Spouse or friend     Barriers to discharge:  none      Additional CM/SW Notes: SW met with pt at bedside. Pt stated he has felt uncomfortable since he has been here. Pt wishes he could have more medication. Informed pt to ask nurse about this. Pt lives with his spouse and has family / friend support. Pt denied any needs. Stated he has a wheelchair, walker, bedside commode , heart monitor, and CPAP machine at home. Rosaura Nugent and/or his family were provided with choice of provider.         [place]   Care Management Department  Ph:  [phone] Fax: [phone]

## 2021-08-26 NOTE — CONSULTS
Nephrology ([facility]) Consult Note      Patient:  Ema Walls  YOB: 1940  Date of Service: 8/26/2021  MRN: [id number]   Acct: [de-identified]   Primary Care Physician: Idania Blas MD  Advance Directive: Full Code  Admit Date: 8/25/2021       Hospital Day: 0  Referring Provider: Marce Barr MD    Patient independently seen and examined, Chart, Consults, Notes, Operative notes, Labs, Cardiology, and Radiology studies reviewed as available. Chief complaint: Abnormal labs. Subjective:  Ema Walls is a [de-identified] y.o. male  whom we were consulted for acute kidney injury/chronic kidney disease. He has stage IV chronic kidney disease and follows Perry Closs, APRN in the office. Patient presented after an episode of severe hypoglycemia associated with grand mal seizure. Apparently he has not been eating any food as he was severely constipated and continue to take his oral hypoglycemic agents. This led to severe hypoglycemia. Patient also has suffered grand mal seizure as per his description with severe jerking movements of his hands and legs leading to laceration of skin of his left lower extremity. He has history of type 2 diabetes, hypertension, stage IV CKD, history of bladder cancer and chronic obstructive pulmonary disease. Patient had 1 episode of acute kidney injury about 2 years ago, needing short-term dialysis. His serum creatinine now 3.3 mg with baseline creatinine of 2.5 mg.     Allergies:  Eliquis [apixaban] and Promethazine hcl    Medicines:  Current Facility-Administered Medications   Medication Dose Route Frequency Provider Last Rate Last Admin    guaiFENesin (ROBITUSSIN) 100 MG/5ML liquid 200 mg  200 mg Oral Q4H PRN Reagan Mensah MD        miconazole (MICOTIN) 2 % powder   Topical BID Reagan Mensah MD        bisacodyl (DULCOLAX) EC tablet 5 mg  5 mg Oral Daily PRN Reagan Mensah MD        0.9 % sodium chloride infusion   IntraVENous Continuous Althea Elias MD 50 mL/hr at 08/26/21 0930 Restarted at 08/26/21 0930    sodium chloride flush 0.9 % injection 5-40 mL  5-40 mL IntraVENous 2 times per day Gerald Duckworth, ELISABETH - CNP   10 mL at 08/26/21 0750    sodium chloride flush 0.9 % injection 5-40 mL  5-40 mL IntraVENous PRN Gerald Duckworth, APRN - CNP        0.9 % sodium chloride infusion  25 mL IntraVENous PRN Gerald Duckworth, ELISABETH - CNP        ondansetron (ZOFRAN-ODT) disintegrating tablet 4 mg  4 mg Oral Q8H PRN Gerald Duckworth, APRN - CNP        Or    ondansetron (ZOFRAN) injection 4 mg  4 mg IntraVENous Q6H PRN Gerald Duckworth, APRN - CNP        acetaminophen (TYLENOL) tablet 650 mg  650 mg Oral Q6H PRN Gerald Duckworth, APRN - CNP        Or    acetaminophen (TYLENOL) suppository 650 mg  650 mg Rectal Q6H PRN Gerald Duckworth, ELISABETH - CNP        magnesium sulfate 2000 mg in 50 mL IVPB premix  2,000 mg IntraVENous PRN Gerald Duckworth, APRN - CNP        potassium chloride (KLOR-CON M) extended release tablet 40 mEq  40 mEq Oral PRN Gerald Duckworth, APRN - CNP        Or    potassium bicarb-citric acid (EFFER-K) effervescent tablet 40 mEq  40 mEq Oral PRN Gerald Duckworth, APRN - CNP        Or    potassium chloride 10 mEq/100 mL IVPB (Peripheral Line)  10 mEq IntraVENous PRN Gerald Duckworth, ELISABETH - CNP        bisacodyl (DULCOLAX) suppository 10 mg  10 mg Rectal Daily PRN Gerald Duckworth, ELISABETH - CNP        sennosides-docusate sodium (SENOKOT-S) 8.6-50 MG tablet 1 tablet  1 tablet Oral BID ELISABETH Santiago - CNP   1 tablet at 08/26/21 0751    magnesium hydroxide (MILK OF MAGNESIA) 400 MG/5ML suspension 30 mL  30 mL Oral Daily PRN Gerald Duckworth, ELISABETH - CNP        heparin (porcine) injection 5,000 Units  5,000 Units Subcutaneous 3 times per day Gerald Duckworth APRN - CNP   5,000 Units at 08/26/21 1416    glucose (GLUTOSE) 40 % oral gel 15 g  15 g Oral PRN ELISABETH Santiago CNP        dextrose 50 % IV solution  12.5 g IntraVENous PRN Michelle Maxcy, APRN - CNP        glucagon (rDNA) injection 1 mg  1 mg IntraMUSCular PRN Michelle Maxcy, APRN - CNP        dextrose 5 % solution  100 mL/hr IntraVENous PRN Michelle Maxcy, APRN - CNP        calcium carbonate (TUMS) chewable tablet 500 mg  500 mg Oral TID PRN Michelle Maxcy, APRN - CNP   500 mg at 08/26/21 1418    amiodarone (CORDARONE) tablet 200 mg  200 mg Oral Daily Michelle Maxcy, APRN - CNP   200 mg at 08/26/21 4300    aspirin EC tablet 81 mg  81 mg Oral Daily Michelle Maxcy, APRN - CNP   81 mg at 08/26/21 0751    atorvastatin (LIPITOR) tablet 20 mg  20 mg Oral Daily Michelle Maxcy, APRN - CNP   20 mg at 19/40/93 2068    folic acid (FOLVITE) tablet 1 mg  1 mg Oral Daily Michelle Maxcy, APRN - CNP   1 mg at 08/26/21 0751    linaclotide (LINZESS) capsule 145 mcg  145 mcg Oral QAM AC Michelle Maxcy, APRN - CNP   145 mcg at 08/26/21 0751    metoprolol succinate (TOPROL XL) extended release tablet 100 mg  100 mg Oral Daily Michelle Maxcy, APRN - CNP   100 mg at 08/26/21 0751    NIFEdipine (PROCARDIA XL) extended release tablet 60 mg  60 mg Oral Daily Michelle Maxcy, APRN - CNP   60 mg at 08/26/21 0751    pantoprazole (PROTONIX) tablet 40 mg  40 mg Oral Daily Michelle Maxcy, APRN - CNP   40 mg at 08/26/21 0444    tamsulosin (FLOMAX) capsule 0.4 mg  0.4 mg Oral BID Michelle Maxcy, APRN - CNP   0.4 mg at 08/26/21 0751    trospium (SANCTURA) tablet 20 mg  20 mg Oral Nightly Michelle Maxcy, APRN - CNP   20 mg at 08/25/21 2036       Past Medical History:  Past Medical History:   Diagnosis Date    Acute liver failure without hepatic coma 10/23/2018    Back pain     \"with tired legs as a result\"    Bladder cancer ([facility] 12/19/2018    Blood circulation, collateral     Carotid arterial disease ([address]     recent surgery    CKD (chronic kidney disease), 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/2018    COPD with acute lower respiratory infection ([facility] 2/24/2021  GERD (gastroesophageal reflux disease)     Hyperlipidemia     Hypertension     Hypertension     Palliative care patient 10/23/2018    Pneumonia due to infectious organism 11/06/2018    Primary osteoarthritis of left knee 10/14/2018    PVD (peripheral vascular disease) (HCC)     Tremor     Tremor on Right side x 1-2 weeks per stepdaughter    Type 2 diabetes mellitus with complication, without long-term current use of insulin ([facility] 1/21/2021       Past Surgical History:  Past Surgical History:   Procedure Laterality Date    BACK SURGERY      CARDIAC CATHETERIZATION  03/23/2021    100% RCA    COLONOSCOPY  2007?  CYSTOSCOPY Bilateral 12/19/2018    CYSTOSCOPY, BIOSPY FULGURATION OF BLADDER TUMOR POSSIBLE TURBT, RETROGRADE PYELOGRAM performed by Sera Garcia MD at [facility] COLONOSCOPY FLX DX W/COLLJ SPEC WHEN PFRMD N/A 09/11/2017    Dr Blayne Qureshi internal hemorrhoids, diverticular disease-HP-No recall (age)   [employer] N/CARPAL TUNNEL SURG Left 07/18/2018    OPEN CARPAL TUNNEL RELEASE performed by Darek Bynum MD at [address] Left 08/28/2018    LEFT CAROTID ENDARTERECTOMY WITH VEIN PATCH ANGIOPLASTY AND COMPLETION ANGIOGRAM performed by Tia Davalos MD at [address] 10/15/2018    LEFT COMPLEX TOTAL KNEE ARTHROPLASTY performed by Darek Bynum MD at [facility] VASCULAR SURGERY  04/21/2015    Deanna BESS Ultrasound guided access of left common femoral artery. Aortogram.Diagnostic right lower extremity arteriogram.Radiologic supervision and interpretation.  VASCULAR SURGERY  01/13/2015    Deanna Sotelo M.D Atherectomy,angioplasty,and stenting of left superficial femoral artery.  VASCULAR SURGERY  03/11/2014    Deanna Sotelo M.D. Ultrasound-guided access of right common femoral artery. Aortogram.Left lower extremity arteriogram.Atherectomy and angioplasty of left superficial femoral artery. Radiologic supervision and interpretation.  VASCULAR SURGERY  2013    Pina BESS Aortogram.Multistation arteriogram right lower extremity. Laser atherectomy and angioplasty of right superficial femoral artery. Selective catheterization of right tibioperoneal trunk. Angioplasty of peroneal artery and tibioperoneal trunk.  VASCULAR SURGERY  10/30/2018    [facility]. Ultrasound guided cannulation of right internal vein. Placement of right internal jugular vein tunneled dialysis catheter bard equistream xk 23cm tip to cuff    VASCULAR SURGERY  2018    [facility]. Removal of tunneled dilaysis catheter right internal jugular vein. Family History  Family History   Problem Relation Age of Onset    Colon Cancer Father     Diabetes Brother     Colon Polyps Neg Hx     Liver Cancer Neg Hx     Liver Disease Neg Hx     Esophageal Cancer Neg Hx     Rectal Cancer Neg Hx     Stomach Cancer Neg Hx        Social History  Social History     Socioeconomic History    Marital status:      Spouse name: Not on file    Number of children: Not on file    Years of education: Not on file    Highest education level: Not on file   Occupational History    Not on file   Tobacco Use    Smoking status: Former Smoker     Quit date: 6/3/2003     Years since quittin.2    Smokeless tobacco: Never Used   Vaping Use    Vaping Use: Never used   Substance and Sexual Activity    Alcohol use:  Yes     Alcohol/week: 12.0 standard drinks     Types: 12 Glasses of wine per week     Comment: 2 glasses of wine every night    Drug use: No    Sexual activity: Yes     Partners: Female   Other Topics Concern    Not on file   Social History Narrative    Not on file     Social Determinants of Health     Financial Resource Strain:     Difficulty of Paying Living Expenses:    Food Insecurity:     Worried About Running Out of Food in the Last Year:     [address] in the Last Year: Transportation Needs:     Lack of Transportation (Medical):  Lack of Transportation (Non-Medical):    Physical Activity:     Days of Exercise per Week:     Minutes of Exercise per Session:    Stress:     Feeling of Stress :    Social Connections:     Frequency of Communication with Friends and Family:     Frequency of Social Gatherings with Friends and Family:     Attends Jainism Services:     Active Member of Clubs or Organizations:     Attends Club or Organization Meetings:     Marital Status:    Intimate Partner Violence:     Fear of Current or Ex-Partner:     Emotionally Abused:     Physically Abused:     Sexually Abused:          Review of Systems:  History obtained from chart review and the patient  General ROS: No fever or chills  Respiratory ROS: No cough, shortness of breath, wheezing  Cardiovascular ROS: No chest pain or palpitations  Gastrointestinal ROS: No abdominal pain or melena  Genito-Urinary ROS: No dysuria or hematuria  Musculoskeletal ROS: No weakness of muscles. 14 point ROS reviewed with the patient and negative except as noted above and in the HPI unless unable to obtain. Objective:  Patient Vitals for the past 24 hrs:   BP Temp Temp src Pulse Resp SpO2 Height Weight   08/26/21 1432 115/60 97.4 °F (36.3 °C) Temporal 59 18 95 %     08/26/21 0618 117/70 97.7 °F (36.5 °C) Temporal 59 14 95 %     08/26/21 0248 (!) 123/56 97.2 °F (36.2 °C) Temporal 61 16 95 %     08/25/21 2144 137/63 97.4 °F (36.3 °C) Temporal 60 16 95 % 6' (1.829 m) 226 lb 14.4 oz (102.9 kg)   08/25/21 1815 110/83   86 16 93 %         Intake/Output Summary (Last 24 hours) at 8/26/2021 1524  Last data filed at 8/26/2021 1432  Gross per 24 hour   Intake 1274.75 ml   Output 425 ml   Net 849.75 ml     General: awake/alert    HEENT: Normocephalic atraumatic head  Neck: Supple with no JVD or carotid bruits.   Chest:  clear to auscultation bilaterally  CVS: regular rate and rhythm  Abdominal: soft, arthroplasty without evidence of complication. Patellar height is anatomic. No acute fracture or dislocation. Vascular calcification and SFA/popliteal artery stents noted. Metallic foreign bodies in the prepatellar soft tissues are unchanged from prior exam. Skin staple lines in the anterior soft tissues are noted. No radiopaque foreign bodies at these skin staple lines or surrounding tissues. No definite underlying osseous injury at these sites. No acute osseous findings. Signed by Dr Syeda Lindsay RENAL COMPLETE    Result Date: 8/26/2021  Examination. US RENAL COMPLETE 8/26/2021 9:41 AM History: Acute renal failure. The ultrasound examination of the kidneys bilaterally is performed. The comparison is made with the previous study dated 2/13/2021. The correlation made with CT scan of the abdomen dated 7/31/2020. The right kidney measures 11.2 x 5.2 x 6.4 cm. There are 2 echolucent nodules in the mid and lower pole of the right kidney measuring 1.5 x 1.3 x 1.2 cm and 2.8 x 3.2 x 3.1 cm. Both have moderately increased since the previous study. There is no intrinsic septation or mural nodules. There is no evidence of hydronephrosis. The remaining renal cortex measures 1.5 cm. Left kidney measures 10.9 x 5.7 x 6 cm. Previously seen small echolucent nodule in the lower pole is not visualized in this study. No hydronephrosis. There is normal corticomedullary differentiation. Renal cortex measures 1.3 cm. The urinary bladder is moderately distended. No intrinsic abnormality. A mild increase in size of the right renal cysts since the previous study. No hydronephrosis on either side. Signed by Dr Henrry Lewis    Result Date: 8/26/2021  Examination. XR CHEST PORTABLE 8/26/2021 12:07 PM History: Shortness of breath. A frontal portable upright view of the chest is compared with the previous study dated 3/22/2021.  There is resolution of the pulmonary congestion and bilateral interstitial infiltrate. There are atelectatic changes at bilateral bases. There is moderate asymmetrical elevation right diaphragm which is similar to the previous study. There is no pleural effusion. No pneumothorax. The heart size is not evaluated due to the portable projection. Atheromatous changes thoracic aorta are noted. A dual-chamber cardiac pacer is seen introduced through the left subclavian vein. No change. No acute bony abnormality. Bilateral lower lung atelectatic changes. Resolution of the pulmonary vascular congestion and infiltrate since the previous study in March 2021. Signed by Dr Brittany Mehta   1. Acute kidney injury stage I  2. Volume depletion versus rhabdomyolysis. 3.  Stage IV chronic kidney disease baseline. 4.  Type II diabetic nephropathy. 5.  Severe hypoglycemia now resolved. Plan:  1. I will get CPK level. 2.  Resume IV fluid. 3.  Urinary electrolyte. 4.  Urinary protein to creatinine ratio. Thank you for the consult, we appreciate the opportunity to provide care to your patients. Feel free to contact me if I can be of any further assistance.       Salvador Morgan MD  08/26/21  3:24 PM

## 2021-08-26 NOTE — PROGRESS NOTES
[facility]      Progress Note    Patient:  Trell Almonte  YOB: 1940  Date of Service: 8/26/2021  MRN: [id number]   Acct: [de-identified]   Primary Care Physician: Evelyne Encinas MD  Advance Directive: Full Code  Admit Date: 8/25/2021       Hospital Day: 0    Portions of this note have been copied forward, however, updated to reflect the most current clinical status of this patient. CHIEF COMPLAINT hypoglycemia    SUBJECTIVE: Mr. Elton Musa was resting comfortably in bed this afternoon. States he continues to have acid reflux. Denies tremors or diaphoresis. Denies shortness of breath or chest pain at this time. CUMULATIVE HOSPITAL COURSE:   The patient is a [de-identified] y.o. male with past medical history of CKD, DM, GERD, HTN, bladder cancer, and COPD who presented to [facility] ED complaining of hypoglycemia. Mr. Elton Musa reported having problems with low blood sugar for the past week. Stated he has been having constipation for the past week and has not been eating well since then, yet continued to take his antidiabetic medicines as prescribed. Reported having cold sweats, hot flashes, tremors \" seizures\" per patient with low blood glucose. Stated he had an episode of tremors night prior to admission. Another episode of tremors on afternoon of admission while sitting in a recliner, and sustained injury to left lower extremity requiring staples to the lacerations. Reported taking full dose of his glyburide day prior to admission, PCP advised to cut his glyburide in half, patient stated he did not take his medicine morning of admission due to blood glucose of 50 in a.m. Horace Brand Denied recent illnesses, fever, chills, or vomiting. Denied shortness of breath or chest pain at this time. Workup in ED revealed hypoglycemia BG 57 on arrival, BUN 41, Cr 3.3, GFR 18 (previous cr of 2.7, GFR 23 on 5/10/21), WBC 11.5, hgb 13.0, Left Tibia/fibula xray negative for acute osseous findings.    Patient was admitted to hospital medicine with hypoglycemia and ANA on CKD. Antidiabetic medications held and IVFs were initiated. Bowel regimen initiated. Nephrology recommended IVFs, and urine studies. Review of Systems   Constitutional: Negative for chills, diaphoresis, fatigue and fever. Denies diaphoresis   HENT: Negative for congestion and ear pain. Eyes: Negative for visual disturbance. Respiratory: Negative for cough, shortness of breath and wheezing. Cardiovascular: Negative for chest pain, palpitations and leg swelling. Gastrointestinal: Negative for abdominal distention, abdominal pain, blood in stool, constipation, diarrhea, nausea and vomiting. Endocrine: Negative for cold intolerance and heat intolerance. Genitourinary: Negative for difficulty urinating, flank pain, frequency and urgency. Musculoskeletal: Negative for arthralgias and myalgias. Skin: Positive for wound. Negative for color change. Laceration to left lower extremity at home    Neurological: Negative for dizziness, syncope, weakness, light-headedness, numbness and headaches. Denies tremors or seizures   Hematological: Does not bruise/bleed easily. Psychiatric/Behavioral: Negative for agitation, confusion and dysphoric mood. Objective:   VITALS:  /60   Pulse 59   Temp 97.4 °F (36.3 °C) (Temporal)   Resp 18   Ht 6' (1.829 m)   Wt 226 lb 14.4 oz (102.9 kg)   SpO2 95%   BMI 30.77 kg/m²   24HR INTAKE/OUTPUT:    Intake/Output Summary (Last 24 hours) at 8/26/2021 1548  Last data filed at 8/26/2021 1432  Gross per 24 hour   Intake 1274.75 ml   Output 425 ml   Net 849.75 ml         Physical Exam  Constitutional:       General: He is not in acute distress. Appearance: Normal appearance. He is not ill-appearing. HENT:      Head: Normocephalic and atraumatic.       Right Ear: External ear normal.      Left Ear: External ear normal.      Nose: Nose normal.      Mouth/Throat:      Mouth: Mucous membranes Daily    pantoprazole  40 mg Oral Daily    tamsulosin  0.4 mg Oral BID    trospium  20 mg Oral Nightly     guaiFENesin, bisacodyl, sodium chloride flush, sodium chloride, ondansetron **OR** ondansetron, acetaminophen **OR** acetaminophen, magnesium sulfate, potassium chloride **OR** potassium alternative oral replacement **OR** potassium chloride, bisacodyl, magnesium hydroxide, glucose, dextrose, glucagon (rDNA), dextrose, calcium carbonate  ADULT DIET; Regular; No Added Salt (3-4 gm)     Lab and other Data:     Recent Labs     08/25/21  1800 08/26/21  0340   WBC 11.5* 8.3   HGB 13.0* 10.4*    138     Recent Labs     08/25/21  1800 08/26/21  0340    138   K 4.1 4.0   CL 93* 94*   CO2 29 32*   BUN 41* 42*   CREATININE 3.3* 3.5*   GLUCOSE 68* 139*     Recent Labs     08/25/21  1800 08/26/21  0340   AST 27 19   ALT 22 18   BILITOT 0.5 0.4   ALKPHOS 114 99     Troponin T:   Recent Labs     08/25/21  1800   TROPONINI 0.03       RAD:     XR TIBIA FIBULA LEFT (2 VIEWS)  Result Date: 8/25/2021    No acute osseous findings. Signed by Dr Ryann Marcano RENAL COMPLETE  Result Date: 8/26/2021    A mild increase in size of the right renal cysts since the previous study. No hydronephrosis on either side. Signed by Dr Domonique Gallego  Result Date: 8/26/2021    Bilateral lower lung atelectatic changes. Resolution of the pulmonary vascular congestion and infiltrate since the previous study in March 2021. Signed by Dr Kate Dandy:    Kylie Magana- negative       Assessment/Plan   Principal Problem:    Hypoglycemia  Active Problems:    GERD (gastroesophageal reflux disease)    Type 2 diabetes mellitus with complication, without long-term current use of insulin (HCC)    Chronic kidney disease    Acute kidney injury superimposed on CKD (HCC)    Constipation  Resolved Problems:    * No resolved hospital problems.  *    Principal Problem:    Hypoglycemia-               - Monitor blood glucose closely               - Hypoglycemia treatment protocol in place         Active Problems:   Acute kidney injury superimposed on CKD (HCC)/ Chronic kidney disease-    - Nephrology following               - Creatinine 3.5 today               - IVFs               - Monitor I's and O's closely              - Monitor labs closely              - Avoid hypotension              - Avoid nephrotoxic agent        Constipation- Bowel regimen, Senokot twice daily, as needed milk of magnesia, PRN Dulcolax suppository       GERD (gastroesophageal reflux disease)- noted, continue home medications, PRN Tums       Type 2 diabetes mellitus with complication, without long-term current use of insulin ([address]- noted, currently hypoglycemic            DVT Prophylaxis: Heparin SubQ    GI prophylaxis:  Protonix     Further Orders per Clinical course/attending. Electronically signed by ELISABETH Vaughan CNP on 8/26/2021 at 3:48 PM       EMR Dragon/Transcription disclaimer:   Much of this encounter note is an electronic transcription/translation of spoken language to printed text.  The electronic translation of spoken language may permit erroneous, or at times, nonsensical words or phrases to be inadvertently transcribed; although attempts have made to review the note for such errors, some may still exist.

## 2021-08-26 NOTE — H&P
 Pneumonia due to infectious organism 11/06/2018    Primary osteoarthritis of left knee 10/14/2018    PVD (peripheral vascular disease) (HCC)     Tremor     Tremor on Right side x 1-2 weeks per stepdaughter    Type 2 diabetes mellitus with complication, without long-term current use of insulin ([facility] [address] 1/21/2021       Past Surgical History:        Procedure Laterality Date    BACK SURGERY      CARDIAC CATHETERIZATION  03/23/2021    100% RCA    COLONOSCOPY  2007?  CYSTOSCOPY Bilateral 12/19/2018    CYSTOSCOPY, BIOSPY FULGURATION OF BLADDER TUMOR POSSIBLE TURBT, RETROGRADE PYELOGRAM performed by Alex Turk MD at [facility] COLONOSCOPY FLX DX W/COLLJ SPEC WHEN PFRMD N/A 09/11/2017    Dr Mariella Fuchs internal hemorrhoids, diverticular disease-HP-No recall (age)   [de-identified] MS REVISE MEDIAN N/CARPAL TUNNEL SURG Left 07/18/2018    OPEN CARPAL TUNNEL RELEASE performed by Denise Alvarez MD at [address] Left 08/28/2018    LEFT CAROTID ENDARTERECTOMY WITH VEIN PATCH ANGIOPLASTY AND COMPLETION ANGIOGRAM performed by Judi Rico MD at [address] [facility] Left 10/15/2018    LEFT COMPLEX TOTAL KNEE ARTHROPLASTY performed by Denise Alvarez MD at [facility] VASCULAR SURGERY  04/21/2015    Enrique BESS Ultrasound guided access of left common femoral artery. Aortogram.Diagnostic right lower extremity arteriogram.Radiologic supervision and interpretation.  VASCULAR SURGERY  01/13/2015    Enrique Wang M.D Atherectomy,angioplasty,and stenting of left superficial femoral artery.  VASCULAR SURGERY  03/11/2014    Enrique Wang M.D. Ultrasound-guided access of right common femoral artery. Aortogram.Left lower extremity arteriogram.Atherectomy and angioplasty of left superficial femoral artery. Radiologic supervision and interpretation.  VASCULAR SURGERY  01/18/2013    Enrique BESS Aortogram.Multistation arteriogram right lower extremity. Laser atherectomy and angioplasty of right superficial femoral artery. Selective catheterization of right tibioperoneal trunk. Angioplasty of peroneal artery and tibioperoneal trunk.  VASCULAR SURGERY  10/30/2018    S. Ultrasound guided cannulation of right internal vein. Placement of right internal jugular vein tunneled dialysis catheter bard marjaneam xk 23cm tip to cuff    VASCULAR SURGERY  12/17/2018    S. Removal of tunneled dilaysis catheter right internal jugular vein. Home Medications:  Prior to Admission medications    Medication Sig Start Date End Date Taking? Authorizing Provider   mirabegron (MYRBETRIQ) 25 MG TB24 Take 1 tablet by mouth daily 8/16/21   Bereket Chavez, APRN - CNP   trospium (SANCTURA) 20 MG tablet Take 1 tablet by mouth daily 8/16/21   Bereket Chavez, APRN - CNP   amiodarone (CORDARONE) 200 MG tablet Take 1 tablet by mouth daily 5/3/21   Noah Hermosillo APRN - NP   bumetanide (BUMEX) 1 MG tablet Take 1 mg by mouth 2 times daily 4/12/21   Historical Provider, MD   glipiZIDE (GLUCOTROL) 5 MG tablet TAKE 1 TABLET BY MOUTH EVERY MORNING BEFORE BREAKFAST 3/29/21   Johnny Luu MD   aspirin 81 MG EC tablet Take 1 tablet by mouth daily 3/29/21   Napoleon Rivera MD   atorvastatin (LIPITOR) 20 MG tablet Take 1 tablet by mouth daily 3/29/21   Napoleon Rivera MD   metoprolol succinate (TOPROL XL) 100 MG extended release tablet Take 1 tablet by mouth daily 3/29/21   Napoleon Rivera MD   NIFEdipine (ADALAT CC) 60 MG extended release tablet Take 1 tablet by mouth daily 3/29/21   Napoleon Rivera MD   guaiFENesin (ROBITUSSIN) 100 MG/5ML syrup Take 10 mLs by mouth every 4 hours as needed for Cough 3/29/21   Napoleon Rivera MD   miconazole (MICOTIN) 2 % powder Apply topically 2 times daily.  3/29/21   Napoleon Rivera MD   ferrous sulfate (IRON 325) 325 (65 Fe) MG tablet Take 1 tablet by mouth 2 times daily (with meals) 3/29/21   Napoleon Rivera MD   folic acid (Christine Aldo) 1 MG tablet Take 1 tablet by mouth daily 3/29/21   Ruy Lyles MD   bisacodyl (DULCOLAX) 5 MG EC tablet Take 1 tablet by mouth daily as needed for Constipation 3/29/21   Ruy Lyles MD   tamsulosin [facility]) 0.4 MG capsule Take 1 capsule by mouth 2 times daily 3/29/21   Ruy Lyles MD   linaclotide [facility]) 145 MCG capsule Take 1 capsule by mouth every morning (before breakfast) 3/30/21   Ruy Lyles MD   pantoprazole (PROTONIX) 40 MG tablet Take 1 tablet by mouth daily 3/29/21   Ruy Lyles MD   vitamin D (ERGOCALCIFEROL) 1.25 MG (11281 UT) CAPS capsule Take 1 capsule by mouth once a week 3/29/21   Ruy Lyles MD   OXYGEN Inhale 2 L into the lungs continuous 3/29/21   Ruy Lyles MD       Allergies:    Eliquis [apixaban] and Promethazine hcl    Social History:    The patient currently lives at home  Tobacco:   reports that he quit smoking about 18 years ago. He has never used smokeless tobacco.  Alcohol:   reports current alcohol use of about 12.0 standard drinks of alcohol per week. Illicit Drugs: denies    Family History:      Problem Relation Age of Onset    Colon Cancer Father     Diabetes Brother     Colon Polyps Neg Hx     Liver Cancer Neg Hx     Liver Disease Neg Hx     Esophageal Cancer Neg Hx     Rectal Cancer Neg Hx     Stomach Cancer Neg Hx          Review of Systems   Constitutional: Positive for diaphoresis. Negative for chills, fatigue and fever. Reports hot flashes   HENT: Negative for congestion and ear pain. Eyes: Negative for visual disturbance. Respiratory: Negative for cough, shortness of breath and wheezing. Cardiovascular: Negative for chest pain, palpitations and leg swelling. Gastrointestinal: Positive for constipation and nausea. Negative for abdominal distention, abdominal pain, blood in stool, diarrhea and vomiting. Endocrine: Negative for cold intolerance and heat intolerance.    Genitourinary: Negative for difficulty urinating, flank pain, frequency and urgency. Musculoskeletal: Negative for arthralgias and myalgias. Skin: Positive for wound. Negative for color change. Laceration to left lower extremity at home   Neurological: Positive for tremors and seizures. Negative for dizziness, syncope, weakness, light-headedness, numbness and headaches. Reports having tremors/seizures at home   Hematological: Does not bruise/bleed easily. Psychiatric/Behavioral: Negative for agitation, confusion and dysphoric mood. Denies confusion        Physical Examination:  /83   Pulse 86   Resp 16   SpO2 93%     Physical Exam  Constitutional:       General: He is not in acute distress. Appearance: Normal appearance. He is not ill-appearing. HENT:      Head: Normocephalic and atraumatic. Right Ear: External ear normal.      Left Ear: External ear normal.      Nose: Nose normal.      Mouth/Throat:      Mouth: Mucous membranes are moist.   Eyes:      Extraocular Movements: Extraocular movements intact. Conjunctiva/sclera: Conjunctivae normal.      Pupils: Pupils are equal, round, and reactive to light. Cardiovascular:      Rate and Rhythm: Normal rate and regular rhythm. Pulses: Normal pulses. Heart sounds: Normal heart sounds. Pulmonary:      Effort: Pulmonary effort is normal. No respiratory distress. Breath sounds: Normal breath sounds. No wheezing, rhonchi or rales. Abdominal:      General: Bowel sounds are normal. There is no distension. Palpations: Abdomen is soft. Tenderness: There is no abdominal tenderness. Comments: Hypoactive bowel sounds   Musculoskeletal:         General: Signs of injury present. No swelling, tenderness or deformity. Normal range of motion. Cervical back: Normal range of motion and neck supple. No muscular tenderness. Right lower leg: No edema. Left lower leg: No edema.       Comments: Injury to left lower extremity at home, status post staples in ED, currently wrapped in gauze   Skin:     General: Skin is warm and dry. Findings: Bruising present. No lesion. Comments: Bruise noted to medial right lower extremity   Neurological:      Mental Status: He is alert and oriented to person, place, and time. Comments: A&O x3   Psychiatric:         Mood and Affect: Mood normal.         Behavior: Behavior normal.         Thought Content: Thought content normal.          Diagnostic Data:  CBC:  Recent Labs     08/25/21  1800   WBC 11.5*   HGB 13.0*   HCT 41.2*        BMP:  Recent Labs     08/25/21  1800      K 4.1   CL 93*   CO2 29   BUN 41*   CREATININE 3.3*   CALCIUM 9.9     Recent Labs     08/25/21  1800   AST 27   ALT 22   BILITOT 0.5   ALKPHOS 114     Cardiac Enzymes:   Recent Labs     08/25/21  1800   TROPONINI 0.03         RAD:    XR TIBIA FIBULA LEFT (2 VIEWS)  Result Date: 8/25/2021    No acute osseous findings. Signed by Dr Margherita Ahumada      Assessment/Plan:  Principal Problem:    Hypoglycemia  Active Problems:    GERD (gastroesophageal reflux disease)    Type 2 diabetes mellitus with complication, without long-term current use of insulin ([facility])    Chronic kidney disease    Acute kidney injury superimposed on CKD ([facility])    Constipation  Resolved Problems:    * No resolved hospital problems.  *       Principal Problem:    Hypoglycemia-    - Monitor blood glucose closely   - Hypoglycemia treatment protocol in place      Active Problems:   Acute kidney injury superimposed on CKD (HCC)/ Chronic kidney disease-               - Creatinine 3.3 today               - IVFs               - Monitor I's and O's closely              - Monitor labs closely              - Avoid hypotension              - Avoid nephrotoxic agent      Constipation- Bowel regimen, Senokot twice daily, as needed milk of magnesia, PRN Dulcolax suppository      GERD (gastroesophageal reflux disease)- noted, resume home medications, as needed times      Type 2 diabetes mellitus with complication, without long-term current use of insulin ([facility] [address]- noted, currently hyperglycemic         DVT Prophylaxis: Heparin SubQ     Further Orders per Clinical course/attending. Signed:  Electronically signed by ELISABETH Vaughan CNP on 8/25/21 at 8:18 PM CDT       EMR Dragon/Transcription disclaimer:   Much of this encounter note is an electronic transcription/translation of spoken language to printed text.  The electronic translation of spoken language may permit erroneous, or at times, nonsensical words or phrases to be inadvertently transcribed; although attempts have made to review the note for such errors, some may still exist.

## 2021-08-27 LAB
ALBUMIN SERPL-MCNC: 3.2 G/DL (ref 3.5–5.2)
ALP BLD-CCNC: 93 U/L (ref 40–130)
ALT SERPL-CCNC: 19 U/L (ref 5–41)
ANION GAP SERPL CALCULATED.3IONS-SCNC: 10 MMOL/L (ref 7–19)
AST SERPL-CCNC: 19 U/L (ref 5–40)
BASOPHILS ABSOLUTE: 0.1 K/UL (ref 0–0.2)
BASOPHILS RELATIVE PERCENT: 1.2 % (ref 0–1)
BILIRUB SERPL-MCNC: 0.4 MG/DL (ref 0.2–1.2)
BUN BLDV-MCNC: 39 MG/DL (ref 8–23)
CALCIUM SERPL-MCNC: 8.1 MG/DL (ref 8.8–10.2)
CHLORIDE BLD-SCNC: 100 MMOL/L (ref 98–111)
CO2: 28 MMOL/L (ref 22–29)
CREAT SERPL-MCNC: 3.1 MG/DL (ref 0.5–1.2)
EOSINOPHILS ABSOLUTE: 0 K/UL (ref 0–0.6)
EOSINOPHILS RELATIVE PERCENT: 0 % (ref 0–5)
GFR AFRICAN AMERICAN: 24
GFR NON-AFRICAN AMERICAN: 19
GLUCOSE BLD-MCNC: 139 MG/DL (ref 70–99)
GLUCOSE BLD-MCNC: 147 MG/DL (ref 74–109)
GLUCOSE BLD-MCNC: 176 MG/DL (ref 70–99)
GLUCOSE BLD-MCNC: 262 MG/DL (ref 70–99)
HBA1C MFR BLD: 6.3 % (ref 4–6)
HCT VFR BLD CALC: 30.5 % (ref 42–52)
HEMOGLOBIN: 9.6 G/DL (ref 14–18)
IMMATURE GRANULOCYTES #: 0.2 K/UL
LYMPHOCYTES ABSOLUTE: 1.4 K/UL (ref 1.1–4.5)
LYMPHOCYTES RELATIVE PERCENT: 20.6 % (ref 20–40)
MCH RBC QN AUTO: 29.4 PG (ref 27–31)
MCHC RBC AUTO-ENTMCNC: 31.5 G/DL (ref 33–37)
MCV RBC AUTO: 93.3 FL (ref 80–94)
MONOCYTES ABSOLUTE: 0.7 K/UL (ref 0–0.9)
MONOCYTES RELATIVE PERCENT: 9.9 % (ref 0–10)
NEUTROPHILS ABSOLUTE: 4.5 K/UL (ref 1.5–7.5)
NEUTROPHILS RELATIVE PERCENT: 64.8 % (ref 50–65)
PDW BLD-RTO: 14.1 % (ref 11.5–14.5)
PERFORMED ON: ABNORMAL
PLATELET # BLD: 124 K/UL (ref 130–400)
PMV BLD AUTO: 11.9 FL (ref 9.4–12.4)
POTASSIUM REFLEX MAGNESIUM: 4.2 MMOL/L (ref 3.5–5)
POTASSIUM SERPL-SCNC: 4.2 MMOL/L (ref 3.5–5)
RBC # BLD: 3.27 M/UL (ref 4.7–6.1)
SODIUM BLD-SCNC: 138 MMOL/L (ref 136–145)
TOTAL PROTEIN: 4.9 G/DL (ref 6.6–8.7)
WBC # BLD: 6.9 K/UL (ref 4.8–10.8)

## 2021-08-27 PROCEDURE — 2500000003 HC RX 250 WO HCPCS: Performed by: HOSPITALIST

## 2021-08-27 PROCEDURE — 82947 ASSAY GLUCOSE BLOOD QUANT: CPT

## 2021-08-27 PROCEDURE — 85025 COMPLETE CBC W/AUTO DIFF WBC: CPT

## 2021-08-27 PROCEDURE — 1210000000 HC MED SURG R&B

## 2021-08-27 PROCEDURE — 83036 HEMOGLOBIN GLYCOSYLATED A1C: CPT

## 2021-08-27 PROCEDURE — 36415 COLL VENOUS BLD VENIPUNCTURE: CPT

## 2021-08-27 PROCEDURE — 2580000003 HC RX 258: Performed by: NURSE PRACTITIONER

## 2021-08-27 PROCEDURE — 80053 COMPREHEN METABOLIC PANEL: CPT

## 2021-08-27 PROCEDURE — 6370000000 HC RX 637 (ALT 250 FOR IP): Performed by: NURSE PRACTITIONER

## 2021-08-27 RX ADMIN — DOCUSATE SODIUM 50 MG AND SENNOSIDES 8.6 MG 1 TABLET: 8.6; 5 TABLET, FILM COATED ORAL at 21:31

## 2021-08-27 RX ADMIN — ATORVASTATIN CALCIUM 20 MG: 40 TABLET, FILM COATED ORAL at 09:41

## 2021-08-27 RX ADMIN — TAMSULOSIN HYDROCHLORIDE 0.4 MG: 0.4 CAPSULE ORAL at 09:41

## 2021-08-27 RX ADMIN — MICONAZOLE NITRATE: 2 POWDER TOPICAL at 21:34

## 2021-08-27 RX ADMIN — SODIUM CHLORIDE, PRESERVATIVE FREE 10 ML: 5 INJECTION INTRAVENOUS at 09:42

## 2021-08-27 RX ADMIN — DOCUSATE SODIUM 50 MG AND SENNOSIDES 8.6 MG 1 TABLET: 8.6; 5 TABLET, FILM COATED ORAL at 09:41

## 2021-08-27 RX ADMIN — TROSPIUM CHLORIDE 20 MG: 20 TABLET, FILM COATED ORAL at 21:31

## 2021-08-27 RX ADMIN — AMIODARONE HYDROCHLORIDE 200 MG: 200 TABLET ORAL at 09:41

## 2021-08-27 RX ADMIN — PANTOPRAZOLE SODIUM 40 MG: 40 TABLET, DELAYED RELEASE ORAL at 09:42

## 2021-08-27 RX ADMIN — FOLIC ACID 1 MG: 1 TABLET ORAL at 09:42

## 2021-08-27 RX ADMIN — TAMSULOSIN HYDROCHLORIDE 0.4 MG: 0.4 CAPSULE ORAL at 21:31

## 2021-08-27 RX ADMIN — MICONAZOLE NITRATE: 2 POWDER TOPICAL at 09:51

## 2021-08-27 RX ADMIN — LINACLOTIDE 145 MCG: 145 CAPSULE, GELATIN COATED ORAL at 09:40

## 2021-08-27 RX ADMIN — ASPIRIN 81 MG: 81 TABLET, COATED ORAL at 09:40

## 2021-08-27 NOTE — PROGRESS NOTES
Nephrology ([address] [facility]) Progress Note      Patient:  Jazmin Valiente  YOB: 1940  Date of Service: 8/27/2021  MRN: [id number]   Acct: [de-identified]   Primary Care Physician: Audrey Beckman MD  Advance Directive: Full Code  Admit Date: 8/25/2021       Hospital Day: 0  Referring Provider: Lacho Snow MD    Patient independently seen and examined, Chart, Consults, Notes, Operative notes, Labs, Cardiology, and Radiology studies reviewed as available. Chief complaint: Abnormal labs. Subjective:  Jazmin Valiente is a [de-identified] y.o. male  whom we were consulted for acute kidney injury/chronic kidney disease. He has stage IV chronic kidney disease and follows ELISABETH Johnson in the office. Patient presented after an episode of severe hypoglycemia associated with grand mal seizure. Apparently he has not been eating any food as he was severely constipated and continue to take his oral hypoglycemic agents. This led to severe hypoglycemia. Patient also has suffered grand mal seizure as per his description with severe jerking movements of his hands and legs leading to laceration of skin of his left lower extremity. He has history of type 2 diabetes, hypertension, stage IV CKD, history of bladder cancer and chronic obstructive pulmonary disease. Patient had 1 episode of acute kidney injury about 2 years ago, needing short-term dialysis. His serum creatinine now 3.3 mg with baseline creatinine of 2.5 mg. This morning he feels well. He has slow improvement of renal function.     Allergies:  Eliquis [apixaban] and Promethazine hcl    Medicines:  Current Facility-Administered Medications   Medication Dose Route Frequency Provider Last Rate Last Admin    guaiFENesin (ROBITUSSIN) 100 MG/5ML liquid 200 mg  200 mg Oral Q4H PRN Amador Tavarez MD        miconazole (MICOTIN) 2 % powder   Topical BID Amador Tavarez MD        bisacodyl (DULCOLAX) EC tablet 5 mg  5 mg Oral Daily PRN Jordan Christy MD        0.9 % sodium chloride infusion   IntraVENous Continuous Conrad Lee  mL/hr at 08/26/21 1639 Rate Change at 08/26/21 1639    sodium chloride flush 0.9 % injection 5-40 mL  5-40 mL IntraVENous 2 times per day Margeret Rave, APRN - CNP   10 mL at 08/26/21 0750    sodium chloride flush 0.9 % injection 5-40 mL  5-40 mL IntraVENous PRN Margeret Rave, APRN - CNP        0.9 % sodium chloride infusion  25 mL IntraVENous PRN Margeret Rave, APRN - CNP        ondansetron (ZOFRAN-ODT) disintegrating tablet 4 mg  4 mg Oral Q8H PRN Margeret Rave, APRN - CNP        Or    ondansetron (ZOFRAN) injection 4 mg  4 mg IntraVENous Q6H PRN Margeret Rave, APRN - CNP        acetaminophen (TYLENOL) tablet 650 mg  650 mg Oral Q6H PRN Margeret Rave, APRN - CNP        Or    acetaminophen (TYLENOL) suppository 650 mg  650 mg Rectal Q6H PRN Margeret Rave, APRN - CNP        magnesium sulfate 2000 mg in 50 mL IVPB premix  2,000 mg IntraVENous PRN Margeret Rave, APRN - CNP        potassium chloride (KLOR-CON M) extended release tablet 40 mEq  40 mEq Oral PRN Margeret Rave, APRN - CNP        Or    potassium bicarb-citric acid (EFFER-K) effervescent tablet 40 mEq  40 mEq Oral PRN Margeret Rave, APRN - CNP        Or    potassium chloride 10 mEq/100 mL IVPB (Peripheral Line)  10 mEq IntraVENous PRN Margeret Rave, APRN - CNP        bisacodyl (DULCOLAX) suppository 10 mg  10 mg Rectal Daily PRN Margeret Rave, APRN - CNP        sennosides-docusate sodium (SENOKOT-S) 8.6-50 MG tablet 1 tablet  1 tablet Oral BID Margeret Rashad, APRN - CNP   1 tablet at 08/26/21 2036    magnesium hydroxide (MILK OF MAGNESIA) 400 MG/5ML suspension 30 mL  30 mL Oral Daily PRN Margeret Rave, APRN - CNP        glucose (GLUTOSE) 40 % oral gel 15 g  15 g Oral PRN Margeret Rave, APRN - CNP        dextrose 50 % IV solution  12.5 g IntraVENous PRN ELISABETH Coon CNP       [facility] glucagon (rDNA) injection 1 mg  1 mg IntraMUSCular PRN ELISABETH Wayne CNP        dextrose 5 % solution  100 mL/hr IntraVENous PRN ELISABETH Wayne CNP        calcium carbonate (TUMS) chewable tablet 500 mg  500 mg Oral TID PRN ELISABETH Wayne - CNP   500 mg at 08/26/21 1418    amiodarone (CORDARONE) tablet 200 mg  200 mg Oral Daily ELISABETH Wayne - CNP   200 mg at 08/26/21 0478    aspirin EC tablet 81 mg  81 mg Oral Daily ELISABETH Wayne - CNP   81 mg at 08/26/21 0751    atorvastatin (LIPITOR) tablet 20 mg  20 mg Oral Daily Sari Lora APRN - CNP   20 mg at 06/67/92 0225    folic acid (FOLVITE) tablet 1 mg  1 mg Oral Daily ELISABETH Wayne - CNP   1 mg at 08/26/21 0751    linaclotide (LINZESS) capsule 145 mcg  145 mcg Oral QAM AC Sari Lora APRN - CNP   145 mcg at 08/26/21 0751    metoprolol succinate (TOPROL XL) extended release tablet 100 mg  100 mg Oral Daily Sari Lora APRN - CNP   100 mg at 08/26/21 0751    NIFEdipine (PROCARDIA XL) extended release tablet 60 mg  60 mg Oral Daily ELISABETH Wayne - CNP   60 mg at 08/26/21 0751    pantoprazole (PROTONIX) tablet 40 mg  40 mg Oral Daily ELISABETH Wayne - CNP   40 mg at 08/26/21 7337    tamsulosin (FLOMAX) capsule 0.4 mg  0.4 mg Oral BID ELISABETH Wayne CNP   0.4 mg at 08/26/21 2036    trospium (SANCTURA) tablet 20 mg  20 mg Oral Nightly ELISABETH Wayne - CNP   20 mg at 08/26/21 2036       Past Medical History:  Past Medical History:   Diagnosis Date    Acute liver failure without hepatic coma 10/23/2018    Back pain     \"with tired legs as a result\"    Bladder cancer ([facility] [address] 12/19/2018    Blood circulation, collateral     Carotid arterial disease ([facility] [address]     recent surgery    CKD (chronic kidney disease), 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/2018    COPD with acute lower respiratory infection ([facility] [address] 2/24/2021    GERD (gastroesophageal reflux disease)     Hyperlipidemia     interpretation.  VASCULAR SURGERY  2013    Leda BESS Aortogram.Multistation arteriogram right lower extremity. Laser atherectomy and angioplasty of right superficial femoral artery. Selective catheterization of right tibioperoneal trunk. Angioplasty of peroneal artery and tibioperoneal trunk.  VASCULAR SURGERY  10/30/2018    [facility]. Ultrasound guided cannulation of right internal vein. Placement of right internal jugular vein tunneled dialysis catheter bard equistream xk 23cm tip to cuff    VASCULAR SURGERY  2018    [facility]. Removal of tunneled dilaysis catheter right internal jugular vein. Family History  Family History   Problem Relation Age of Onset    Colon Cancer Father     Diabetes Brother     Colon Polyps Neg Hx     Liver Cancer Neg Hx     Liver Disease Neg Hx     Esophageal Cancer Neg Hx     Rectal Cancer Neg Hx     Stomach Cancer Neg Hx        Social History  Social History     Socioeconomic History    Marital status:      Spouse name: Not on file    Number of children: Not on file    Years of education: Not on file    Highest education level: Not on file   Occupational History    Not on file   Tobacco Use    Smoking status: Former Smoker     Quit date: 6/3/2003     Years since quittin.2    Smokeless tobacco: Never Used   Vaping Use    Vaping Use: Never used   Substance and Sexual Activity    Alcohol use:  Yes     Alcohol/week: 12.0 standard drinks     Types: 12 Glasses of wine per week     Comment: 2 glasses of wine every night    Drug use: No    Sexual activity: Yes     Partners: Female   Other Topics Concern    Not on file   Social History Narrative    Not on file     Social Determinants of Health     Financial Resource Strain:     Difficulty of Paying Living Expenses:    Food Insecurity:     Worried About Running Out of Food in the Last Year:     Ran Out of Food in the Last Year:    Transportation Needs:     Lack of Transportation (Medical):    Andie Pacheco Lack of Transportation (Non-Medical):    Physical Activity:     Days of Exercise per Week:     Minutes of Exercise per Session:    Stress:     Feeling of Stress :    Social Connections:     Frequency of Communication with Friends and Family:     Frequency of Social Gatherings with Friends and Family:     Attends Gnosticism Services:     Active Member of Clubs or Organizations:     Attends Club or Organization Meetings:     Marital Status:    Intimate Partner Violence:     Fear of Current or Ex-Partner:     Emotionally Abused:     Physically Abused:     Sexually Abused:          Review of Systems:  History obtained from chart review and the patient  General ROS: No fever or chills  Respiratory ROS: No cough, shortness of breath, wheezing  Cardiovascular ROS: No chest pain or palpitations  Gastrointestinal ROS: No abdominal pain or melena  Genito-Urinary ROS: No dysuria or hematuria  Musculoskeletal ROS: No weakness of muscles. 14 point ROS reviewed with the patient and negative except as noted above and in the HPI unless unable to obtain. Objective:  Patient Vitals for the past 24 hrs:   BP Temp Temp src Pulse Resp SpO2   08/27/21 0615 (!) 118/58 97.6 °F (36.4 °C) Temporal 61 18 96 %   08/27/21 0352 (!) 112/58 97.8 °F (36.6 °C) Temporal 59 18 96 %   08/26/21 2303 (!) 117/59 97.7 °F (36.5 °C) Temporal 59 18 96 %   08/26/21 1815 (!) 113/58 97.8 °F (36.6 °C) Temporal 59 18 96 %   08/26/21 1432 115/60 97.4 °F (36.3 °C) Temporal 59 18 95 %       Intake/Output Summary (Last 24 hours) at 8/27/2021 9925  Last data filed at 8/27/2021 9789  Gross per 24 hour   Intake 1031 ml   Output 450 ml   Net 581 ml     General: awake/alert    HEENT: Normocephalic atraumatic head  Neck: Supple with no JVD or carotid bruits. Chest:  clear to auscultation bilaterally  CVS: regular rate and rhythm  Abdominal: soft, nontender, normal bowel sounds  Extremities: Skin tear of left lower extremity.   Skin: warm and dry without rash      Labs:  BMP:   Recent Labs     08/25/21 1800 08/25/21 1800 08/26/21 0340 08/27/21  0335     --  138 138   K 4.1   < > 4.0 4.2  4.2   CL 93*  --  94* 100   CO2 29  --  32* 28   BUN 41*  --  42* 39*   CREATININE 3.3*  --  3.5* 3.1*   CALCIUM 9.9  --  8.7* 8.1*    < > = values in this interval not displayed. CBC:   Recent Labs     08/25/21 1800 08/26/21 0340 08/27/21  0335   WBC 11.5* 8.3 6.9   HGB 13.0* 10.4* 9.6*   HCT 41.2* 33.2* 30.5*   MCV 92.2 91.7 93.3    138 124*     LIVER PROFILE:   Recent Labs     08/25/21 1800 08/26/21 0340 08/27/21  0335   AST 27 19 19   ALT 22 18 19   BILITOT 0.5 0.4 0.4   ALKPHOS 114 99 93     PT/INR: No results for input(s): PROTIME, INR in the last 72 hours. APTT: No results for input(s): APTT in the last 72 hours. BNP:  No results for input(s): BNP in the last 72 hours. Ionized Calcium:No results for input(s): IONCA in the last 72 hours. Magnesium:No results for input(s): MG in the last 72 hours. Phosphorus:No results for input(s): PHOS in the last 72 hours. HgbA1C:   Recent Labs     08/27/21 0335   LABA1C 6.3*     Hepatic:   Recent Labs     08/25/21 1800 08/26/21 0340 08/27/21  0335   ALKPHOS 114 99 93   ALT 22 18 19   AST 27 19 19   PROT 6.9 5.1* 4.9*   BILITOT 0.5 0.4 0.4   LABALBU 4.3 3.5 3.2*     Lactic Acid: No results for input(s): LACTA in the last 72 hours. Troponin:   Recent Labs     08/26/21 0340   CKTOTAL 56     ABGs: No results for input(s): PH, PCO2, PO2, HCO3, O2SAT in the last 72 hours. CRP:  No results for input(s): CRP in the last 72 hours. Sed Rate:  No results for input(s): SEDRATE in the last 72 hours. Cultures:   No results for input(s): CULTURE in the last 72 hours. No results for input(s): BC, Libra Canter in the last 72 hours. No results for input(s): CXSURG in the last 72 hours.     Radiology reports as per the Radiologist  Radiology: XR TIBIA FIBULA LEFT (2 VIEWS)    Result Date: 8/25/2021  EXAM: XR TIBIA FIBULA LEFT (2 VIEWS) - 8/25/2021 6:33 PM INDICATION: Laceration, pain COMPARISON: 11/12/2020 FINDINGS: RIGHT knee arthroplasty without evidence of complication. Patellar height is anatomic. No acute fracture or dislocation. Vascular calcification and SFA/popliteal artery stents noted. Metallic foreign bodies in the prepatellar soft tissues are unchanged from prior exam. Skin staple lines in the anterior soft tissues are noted. No radiopaque foreign bodies at these skin staple lines or surrounding tissues. No definite underlying osseous injury at these sites. No acute osseous findings. Signed by Dr Nay Jaimes RENAL COMPLETE    Result Date: 8/26/2021  Examination. US RENAL COMPLETE 8/26/2021 9:41 AM History: Acute renal failure. The ultrasound examination of the kidneys bilaterally is performed. The comparison is made with the previous study dated 2/13/2021. The correlation made with CT scan of the abdomen dated 7/31/2020. The right kidney measures 11.2 x 5.2 x 6.4 cm. There are 2 echolucent nodules in the mid and lower pole of the right kidney measuring 1.5 x 1.3 x 1.2 cm and 2.8 x 3.2 x 3.1 cm. Both have moderately increased since the previous study. There is no intrinsic septation or mural nodules. There is no evidence of hydronephrosis. The remaining renal cortex measures 1.5 cm. Left kidney measures 10.9 x 5.7 x 6 cm. Previously seen small echolucent nodule in the lower pole is not visualized in this study. No hydronephrosis. There is normal corticomedullary differentiation. Renal cortex measures 1.3 cm. The urinary bladder is moderately distended. No intrinsic abnormality. A mild increase in size of the right renal cysts since the previous study. No hydronephrosis on either side. Signed by Dr Bishop Ice    Result Date: 8/26/2021  Examination. XR CHEST PORTABLE 8/26/2021 12:07 PM History: Shortness of breath.  A frontal portable upright view of the chest is compared with the previous study dated 3/22/2021. There is resolution of the pulmonary congestion and bilateral interstitial infiltrate. There are atelectatic changes at bilateral bases. There is moderate asymmetrical elevation right diaphragm which is similar to the previous study. There is no pleural effusion. No pneumothorax. The heart size is not evaluated due to the portable projection. Atheromatous changes thoracic aorta are noted. A dual-chamber cardiac pacer is seen introduced through the left subclavian vein. No change. No acute bony abnormality. Bilateral lower lung atelectatic changes. Resolution of the pulmonary vascular congestion and infiltrate since the previous study in March 2021. Signed by Dr Sancho Turner   1. Acute kidney injury stage I/ improving. 2.  Prerenal azotemia/low Fena  3. Stage IV chronic kidney disease baseline. 4.  Type II diabetic nephropathy. 5.  Severe hypoglycemia now resolved. Plan:  1. Continue IV fluid.   2.  Monitor renal recovery       Mari Hermosillo MD  08/27/21  9:14 AM

## 2021-08-27 NOTE — PROGRESS NOTES
[facility]        Hospitalist Progress Note  8/27/2021 2:50 PM  Subjective:   Admit Date: 8/25/2021  PCP: Audrey Beckman MD    Chief Complaint: Hypoglycemia    Subjective: Patient seen and examined at bedside watching TV. Comfortable. Denies chest pain or shortness breath. Improving. Cum[facility] Hospital History:     (Copied from hospitalist NP documentation.)  The patient is [de-identified] y. o. male with past medical history of CKD, DM, GERD, HTN, bladder cancer, and COPD who presented to [facility] ED complaining of hypoglycemia. Mr. [facility] reported having problems with low blood sugar for the past week. Stated he has been having constipation for the past week and has not been eating well since then, yet continued to take his antidiabetic medicines as prescribed.  Reported having cold sweats, hot flashes, tremors \" seizures\" per patient with low blood glucose.  Stated he had an episode of tremors night prior to admission. Another episode of tremors on afternoon of admission while sitting in a recliner, and sustained injury to left lower extremity requiring staples to the lacerations.  Reported taking full dose of his glyburide day prior to admission, PCP advised to cut his glyburide in half, patient stated he did not take his medicine morning of admission due to blood glucose of 50 in a.m. Ginny Chang Denied recent illnesses, fever, chills, or vomiting.  Denied shortness of breath or chest pain at this time. Workup in ED revealed hypoglycemia BG 57 on arrival, BUN 41, Cr 3.3, GFR 18 (previous cr of 2.7, GFR 23 on 5/10/21), WBC 11.5, hgb 13.0, Left Tibia/fibula xray negative for acute osseous findings.   Patient was admitted to hospital medicine with hypoglycemia and ANA on CKD. Antidiabetic medications held and IVFs were initiated. Bowel regimen initiated. Nephrology recommended IVFs, and urine studies.   (End copied portion.)    ROS: 14 point review of systems is negative except as specifically addressed above.    ADULT DIET; Regular;  No Added Salt (3-4 gm)    Intake/Output Summary (Last 24 hours) at 8/27/2021 1450  Last data filed at 8/27/2021 0951  Gross per 24 hour   Intake 585 ml   Output 700 ml   Net -115 ml     Medications:   sodium chloride 100 mL/hr at 08/26/21 1639    sodium chloride      dextrose       Current Facility-Administered Medications   Medication Dose Route Frequency Provider Last Rate Last Admin    guaiFENesin (ROBITUSSIN) 100 MG/5ML liquid 200 mg  200 mg Oral Q4H PRN Pascale Lezama MD        miconazole (MICOTIN) 2 % powder   Topical BID Pascale Lezama MD   Given at 08/27/21 0951    bisacodyl (DULCOLAX) EC tablet 5 mg  5 mg Oral Daily PRN Pascale Lezama MD        0.9 % sodium chloride infusion   IntraVENous Continuous Angelito Dinero  mL/hr at 08/26/21 1639 Rate Change at 08/26/21 1639    sodium chloride flush 0.9 % injection 5-40 mL  5-40 mL IntraVENous 2 times per day Daphine Hides, APRN - CNP   10 mL at 08/27/21 0942    sodium chloride flush 0.9 % injection 5-40 mL  5-40 mL IntraVENous PRN Daphine Hides, APRN - CNP        0.9 % sodium chloride infusion  25 mL IntraVENous PRN Daphine Hides, APRN - CNP        ondansetron (ZOFRAN-ODT) disintegrating tablet 4 mg  4 mg Oral Q8H PRN Daphine Hides, APRN - CNP        Or    ondansetron (ZOFRAN) injection 4 mg  4 mg IntraVENous Q6H PRN Daphine Hides, APRN - CNP        acetaminophen (TYLENOL) tablet 650 mg  650 mg Oral Q6H PRN Daphine Hides, APRN - CNP        Or    acetaminophen (TYLENOL) suppository 650 mg  650 mg Rectal Q6H PRN Daphine Hides, APRN - CNP        magnesium sulfate 2000 mg in 50 mL IVPB premix  2,000 mg IntraVENous PRN Daphine Hides, APRN - CNP        potassium chloride (KLOR-CON M) extended release tablet 40 mEq  40 mEq Oral PRN Daphine Hides, APRN - CNP        Or    potassium bicarb-citric acid (EFFER-K) effervescent tablet 40 mEq  40 mEq Oral PRN ELISABETH Kaufman CNP        Or   Annabelle Us potassium chloride 10 mEq/100 mL IVPB (Peripheral Line)  10 mEq IntraVENous PRN Jackquline Montane, APRN - CNP        bisacodyl (DULCOLAX) suppository 10 mg  10 mg Rectal Daily PRN Jackquline Montane, APRN - CNP        sennosides-docusate sodium (SENOKOT-S) 8.6-50 MG tablet 1 tablet  1 tablet Oral BID Jackquline Montane, APRN - CNP   1 tablet at 08/27/21 0941    magnesium hydroxide (MILK OF MAGNESIA) 400 MG/5ML suspension 30 mL  30 mL Oral Daily PRN Jackquline Montane, APRN - CNP        glucose (GLUTOSE) 40 % oral gel 15 g  15 g Oral PRN Jackquline Montane, APRN - CNP        dextrose 50 % IV solution  12.5 g IntraVENous PRN Jackquline Montane, APRN - CNP        glucagon (rDNA) injection 1 mg  1 mg IntraMUSCular PRN Jackquline Montane, APRN - CNP        dextrose 5 % solution  100 mL/hr IntraVENous PRN Jackquline Montane, APRN - CNP        calcium carbonate (TUMS) chewable tablet 500 mg  500 mg Oral TID PRN Jackquline Montane, APRN - CNP   500 mg at 08/26/21 1418    amiodarone (CORDARONE) tablet 200 mg  200 mg Oral Daily Jackquline Montane, APRN - CNP   200 mg at 08/27/21 0941    aspirin EC tablet 81 mg  81 mg Oral Daily Jackquline Montane, APRN - CNP   81 mg at 08/27/21 0940    atorvastatin (LIPITOR) tablet 20 mg  20 mg Oral Daily Jackquline Montane, APRN - CNP   20 mg at 69/42/99 0557    folic acid (FOLVITE) tablet 1 mg  1 mg Oral Daily Jackquline Montane, APRN - CNP   1 mg at 08/27/21 4220    linaclotide (LINZESS) capsule 145 mcg  145 mcg Oral QAM AC Jackquline Montane, APRN - CNP   145 mcg at 08/27/21 0940    metoprolol succinate (TOPROL XL) extended release tablet 100 mg  100 mg Oral Daily Jackquline Montane, APRN - CNP   100 mg at 08/26/21 0751    NIFEdipine (PROCARDIA XL) extended release tablet 60 mg  60 mg Oral Daily Jackquline Montane, APRN - CNP   60 mg at 08/26/21 0751    pantoprazole (PROTONIX) tablet 40 mg  40 mg Oral Daily ELISABETH Magaña CNP   40 mg at 08/27/21 0942    tamsulosin (FLOMAX) capsule 0.4 mg  0.4 mg Oral BID Kunal Ferguson, APRN - CNP   0.4 mg at 08/27/21 0941    trospium (SANCTURA) tablet 20 mg  20 mg Oral Nightly Kunal Ferguson, APRN - CNP   20 mg at 08/26/21 2036        Labs:     Recent Labs     08/25/21 1800 08/26/21 0340 08/27/21  0335   WBC 11.5* 8.3 6.9   RBC 4.47* 3.62* 3.27*   HGB 13.0* 10.4* 9.6*   HCT 41.2* 33.2* 30.5*   MCV 92.2 91.7 93.3   MCH 29.1 28.7 29.4   MCHC 31.6* 31.3* 31.5*    138 124*     Recent Labs     08/25/21 1800 08/25/21 1800 08/26/21 0340 08/27/21  0335     --  138 138   K 4.1   < > 4.0 4.2  4.2   ANIONGAP 16  --  12 10   CL 93*  --  94* 100   CO2 29  --  32* 28   BUN 41*  --  42* 39*   CREATININE 3.3*  --  3.5* 3.1*   GLUCOSE 68*  --  139* 147*   CALCIUM 9.9  --  8.7* 8.1*    < > = values in this interval not displayed. No results for input(s): MG, PHOS in the last 72 hours. Recent Labs     08/25/21 1800 08/26/21 0340 08/27/21  0335   AST 27 19 19   ALT 22 18 19   BILITOT 0.5 0.4 0.4   ALKPHOS 114 99 93     ABGs:No results for input(s): PH, PO2, PCO2, HCO3, BE, O2SAT in the last 72 hours. Troponin T:   Recent Labs     08/25/21 1800   TROPONINI 0.03     INR: No results for input(s): INR in the last 72 hours. Lactic Acid: No results for input(s): LACTA in the last 72 hours.     Objective:   Vitals: BP (!) 116/54   Pulse 60   Temp 97.4 °F (36.3 °C)   Resp 18   Ht 6' (1.829 m)   Wt 226 lb 14.4 oz (102.9 kg)   SpO2 94%   BMI 30.77 kg/m²   24HR INTAKE/OUTPUT:      Intake/Output Summary (Last 24 hours) at 8/27/2021 1450  Last data filed at 8/27/2021 0951  Gross per 24 hour   Intake 585 ml   Output 700 ml   Net -115 ml     General appearance: Alert  HEENT: AT/NC  Lungs: no increased work of breathing, BLAE  Heart: RRR, no murmurs appreciated  Abdomen: BS+, soft, NT, ND  Extremities: pulses+  Neurologic: Alert, gross motor function intact  Skin: warm     Assessment and Plan:   Principal Problem:    Hypoglycemia  Active Problems:    GERD (gastroesophageal reflux disease)    Type 2 diabetes mellitus with complication, without long-term current use of insulin (HCC)    Chronic kidney disease    Acute kidney injury superimposed on CKD ([facility] [address]    Constipation  Resolved Problems:    * No resolved hospital problems. *    Hypoglycemia: HbA1c 6.3 - will discharge without DM medications. Hypoglycemia protocol in place. Blood sugars well controlled now. ANA on CKD: Nephrology on board. IV fluids. Improving. Supportive management. Advance Directive: Full Code    DVT prophylaxis: SCDs    Discharge planning: TBD -home tomorrow if continues to improve.       Signed:  Hima Velez MD 8/27/2021 2:50 PM  Rounding Hospitalist

## 2021-08-28 VITALS
WEIGHT: 226.9 LBS | HEIGHT: 72 IN | SYSTOLIC BLOOD PRESSURE: 132 MMHG | RESPIRATION RATE: 20 BRPM | DIASTOLIC BLOOD PRESSURE: 65 MMHG | OXYGEN SATURATION: 95 % | HEART RATE: 58 BPM | BODY MASS INDEX: 30.73 KG/M2 | TEMPERATURE: 97.2 F

## 2021-08-28 LAB
ALBUMIN SERPL-MCNC: 3.2 G/DL (ref 3.5–5.2)
ALP BLD-CCNC: 99 U/L (ref 40–130)
ALT SERPL-CCNC: 17 U/L (ref 5–41)
ANION GAP SERPL CALCULATED.3IONS-SCNC: 10 MMOL/L (ref 7–19)
AST SERPL-CCNC: 14 U/L (ref 5–40)
ATYPICAL LYMPHOCYTE RELATIVE PERCENT: 2 % (ref 0–8)
BASOPHILS ABSOLUTE: 0 K/UL (ref 0–0.2)
BASOPHILS RELATIVE PERCENT: 0 % (ref 0–1)
BILIRUB SERPL-MCNC: 0.4 MG/DL (ref 0.2–1.2)
BUN BLDV-MCNC: 36 MG/DL (ref 8–23)
CALCIUM SERPL-MCNC: 8 MG/DL (ref 8.8–10.2)
CHLORIDE BLD-SCNC: 102 MMOL/L (ref 98–111)
CO2: 25 MMOL/L (ref 22–29)
CREAT SERPL-MCNC: 2.7 MG/DL (ref 0.5–1.2)
EOSINOPHILS ABSOLUTE: 0.34 K/UL (ref 0–0.6)
EOSINOPHILS RELATIVE PERCENT: 5 % (ref 0–5)
GFR AFRICAN AMERICAN: 28
GFR NON-AFRICAN AMERICAN: 23
GLUCOSE BLD-MCNC: 166 MG/DL (ref 70–99)
GLUCOSE BLD-MCNC: 171 MG/DL (ref 74–109)
GLUCOSE BLD-MCNC: 179 MG/DL (ref 70–99)
HCT VFR BLD CALC: 29.8 % (ref 42–52)
HEMOGLOBIN: 9.6 G/DL (ref 14–18)
IMMATURE GRANULOCYTES #: 0.3 K/UL
LYMPHOCYTES ABSOLUTE: 1.8 K/UL (ref 1.1–4.5)
LYMPHOCYTES RELATIVE PERCENT: 25 % (ref 20–40)
MCH RBC QN AUTO: 30.4 PG (ref 27–31)
MCHC RBC AUTO-ENTMCNC: 32.2 G/DL (ref 33–37)
MCV RBC AUTO: 94.3 FL (ref 80–94)
MONOCYTES ABSOLUTE: 0.1 K/UL (ref 0–0.9)
MONOCYTES RELATIVE PERCENT: 1 % (ref 0–10)
NEUTROPHILS ABSOLUTE: 4.6 K/UL (ref 1.5–7.5)
NEUTROPHILS RELATIVE PERCENT: 67 % (ref 50–65)
OVALOCYTES: ABNORMAL
PDW BLD-RTO: 14 % (ref 11.5–14.5)
PERFORMED ON: ABNORMAL
PERFORMED ON: ABNORMAL
PLATELET # BLD: 120 K/UL (ref 130–400)
PLATELET SLIDE REVIEW: ABNORMAL
PMV BLD AUTO: 11.8 FL (ref 9.4–12.4)
POLYCHROMASIA: ABNORMAL
POTASSIUM REFLEX MAGNESIUM: 4 MMOL/L (ref 3.5–5)
POTASSIUM SERPL-SCNC: 4 MMOL/L (ref 3.5–5)
RBC # BLD: 3.16 M/UL (ref 4.7–6.1)
SODIUM BLD-SCNC: 137 MMOL/L (ref 136–145)
TOTAL PROTEIN: 5.3 G/DL (ref 6.6–8.7)
WBC # BLD: 6.8 K/UL (ref 4.8–10.8)

## 2021-08-28 PROCEDURE — 2580000003 HC RX 258: Performed by: NURSE PRACTITIONER

## 2021-08-28 PROCEDURE — 82947 ASSAY GLUCOSE BLOOD QUANT: CPT

## 2021-08-28 PROCEDURE — 80053 COMPREHEN METABOLIC PANEL: CPT

## 2021-08-28 PROCEDURE — 6370000000 HC RX 637 (ALT 250 FOR IP): Performed by: NURSE PRACTITIONER

## 2021-08-28 PROCEDURE — 36415 COLL VENOUS BLD VENIPUNCTURE: CPT

## 2021-08-28 PROCEDURE — 85025 COMPLETE CBC W/AUTO DIFF WBC: CPT

## 2021-08-28 RX ORDER — SENNA AND DOCUSATE SODIUM 50; 8.6 MG/1; MG/1
1 TABLET, FILM COATED ORAL 2 TIMES DAILY
Qty: 60 TABLET | Refills: 0 | Status: SHIPPED | OUTPATIENT
Start: 2021-08-28 | End: 2021-09-27

## 2021-08-28 RX ADMIN — DOCUSATE SODIUM 50 MG AND SENNOSIDES 8.6 MG 1 TABLET: 8.6; 5 TABLET, FILM COATED ORAL at 08:04

## 2021-08-28 RX ADMIN — LINACLOTIDE 145 MCG: 145 CAPSULE, GELATIN COATED ORAL at 08:03

## 2021-08-28 RX ADMIN — FOLIC ACID 1 MG: 1 TABLET ORAL at 08:04

## 2021-08-28 RX ADMIN — PANTOPRAZOLE SODIUM 40 MG: 40 TABLET, DELAYED RELEASE ORAL at 08:04

## 2021-08-28 RX ADMIN — TAMSULOSIN HYDROCHLORIDE 0.4 MG: 0.4 CAPSULE ORAL at 08:04

## 2021-08-28 RX ADMIN — ASPIRIN 81 MG: 81 TABLET, COATED ORAL at 08:04

## 2021-08-28 RX ADMIN — NIFEDIPINE 60 MG: 60 TABLET, EXTENDED RELEASE ORAL at 08:04

## 2021-08-28 RX ADMIN — AMIODARONE HYDROCHLORIDE 200 MG: 200 TABLET ORAL at 08:04

## 2021-08-28 RX ADMIN — MICONAZOLE NITRATE: 2 POWDER TOPICAL at 09:02

## 2021-08-28 RX ADMIN — METOPROLOL SUCCINATE 100 MG: 50 TABLET, EXTENDED RELEASE ORAL at 08:04

## 2021-08-28 RX ADMIN — SODIUM CHLORIDE, PRESERVATIVE FREE 10 ML: 5 INJECTION INTRAVENOUS at 08:12

## 2021-08-28 RX ADMIN — ATORVASTATIN CALCIUM 20 MG: 40 TABLET, FILM COATED ORAL at 08:04

## 2021-08-28 ASSESSMENT — PAIN SCALES - GENERAL: PAINLEVEL_OUTOF10: 0

## 2021-08-28 NOTE — PROGRESS NOTES
2134    bisacodyl (DULCOLAX) EC tablet 5 mg  5 mg Oral Daily PRN Enoc Cast MD        0.9 % sodium chloride infusion   IntraVENous Continuous Sharath Cerda  mL/hr at 08/26/21 1639 Rate Change at 08/26/21 1639    sodium chloride flush 0.9 % injection 5-40 mL  5-40 mL IntraVENous 2 times per day [facility][note 3], APRN - CNP   10 mL at 08/28/21 8141    sodium chloride flush 0.9 % injection 5-40 mL  5-40 mL IntraVENous PRN [facility][note 3], APRN - CNP        0.9 % sodium chloride infusion  25 mL IntraVENous PRN [facility][note 3], APRN - CNP        ondansetron (ZOFRAN-ODT) disintegrating tablet 4 mg  4 mg Oral Q8H PRN [facility][note 3], APRN - CNP        Or    ondansetron (ZOFRAN) injection 4 mg  4 mg IntraVENous Q6H PRN [facility][note 3], APRN - CNP        acetaminophen (TYLENOL) tablet 650 mg  650 mg Oral Q6H PRN [facility][note 3], APRN - CNP        Or    acetaminophen (TYLENOL) suppository 650 mg  650 mg Rectal Q6H PRN [facility][note 3], APRN - CNP        magnesium sulfate 2000 mg in 50 mL IVPB premix  2,000 mg IntraVENous PRN [facility][note 3], APRN - CNP        potassium chloride (KLOR-CON M) extended release tablet 40 mEq  40 mEq Oral PRN [facility][note 3], APRN - CNP        Or    potassium bicarb-citric acid (EFFER-K) effervescent tablet 40 mEq  40 mEq Oral PRN [facility][note 3], APRN - CNP        Or    potassium chloride 10 mEq/100 mL IVPB (Peripheral Line)  10 mEq IntraVENous PRN [facility][note 3], APRN - CNP        bisacodyl (DULCOLAX) suppository 10 mg  10 mg Rectal Daily PRN [facility][note 3], APRN - CNP        sennosides-docusate sodium (SENOKOT-S) 8.6-50 MG tablet 1 tablet  1 tablet Oral BID [facility][note 3], APRN - CNP   1 tablet at 08/28/21 0804    magnesium hydroxide (MILK OF MAGNESIA) 400 MG/5ML suspension 30 mL  30 mL Oral Daily PRN [facility][note 3], APRN - CNP        glucose (GLUTOSE) 40 % oral gel 15 g  15 g Oral PRN Nuvia Chambers, APRN - CNP        dextrose 50 % IV solution  12.5 g IntraVENous PRN Bretta Dupree, APRN - CNP        glucagon (rDNA) injection 1 mg  1 mg IntraMUSCular PRN Bretta Dupree, APRN - CNP        dextrose 5 % solution  100 mL/hr IntraVENous PRN Bretta Dupree, APRN - CNP        calcium carbonate (TUMS) chewable tablet 500 mg  500 mg Oral TID PRN Bretta Dupree, APRN - CNP   500 mg at 08/26/21 1418    amiodarone (CORDARONE) tablet 200 mg  200 mg Oral Daily Bretta Dupree, APRN - CNP   200 mg at 08/28/21 0804    aspirin EC tablet 81 mg  81 mg Oral Daily Bretta Dupree, APRN - CNP   81 mg at 08/28/21 0804    atorvastatin (LIPITOR) tablet 20 mg  20 mg Oral Daily Bretta Dupree, APRN - CNP   20 mg at 97/79/35 2025    folic acid (FOLVITE) tablet 1 mg  1 mg Oral Daily Bretta Dupree, APRN - CNP   1 mg at 08/28/21 0804    linaclotide (LINZESS) capsule 145 mcg  145 mcg Oral QAM AC Bretta Dupree, APRN - CNP   145 mcg at 08/28/21 5398    metoprolol succinate (TOPROL XL) extended release tablet 100 mg  100 mg Oral Daily Bretta Dupree, APRN - CNP   100 mg at 08/28/21 0804    NIFEdipine (PROCARDIA XL) extended release tablet 60 mg  60 mg Oral Daily Bretta Dupree, APRN - CNP   60 mg at 08/28/21 0804    pantoprazole (PROTONIX) tablet 40 mg  40 mg Oral Daily Bretta Dupree, APRN - CNP   40 mg at 08/28/21 0804    tamsulosin (FLOMAX) capsule 0.4 mg  0.4 mg Oral BID Bretta Dupree, APRN - CNP   0.4 mg at 08/28/21 0804    trospium (SANCTURA) tablet 20 mg  20 mg Oral Nightly Bretta Dupree, APRN - CNP   20 mg at 08/27/21 2131       Past Medical History:  Past Medical History:   Diagnosis Date    Acute liver failure without hepatic coma 10/23/2018    Back pain     \"with tired legs as a result\"    Bladder cancer ([facility] 12/19/2018    Blood circulation, collateral     Carotid arterial disease ([facility]     recent surgery    CKD (chronic kidney disease), 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/2018    COPD with acute lower respiratory infection ([facility] 2/24/2021    GERD (gastroesophageal reflux disease)     Hyperlipidemia     Hypertension     Hypertension     Palliative care patient 10/23/2018    Pneumonia due to infectious organism 11/06/2018    Primary osteoarthritis of left knee 10/14/2018    [facility]peripheral vascular [facility]) [facility]     Tremor     Tremor on Right side x 1-2 weeks per stepdaughter    Type 2 diabetes mellitus with complication, without long-term current use of insulin ([facility] 1/21/2021       Past Surgical History:  Past Surgical History:   Procedure Laterality Date    BACK SURGERY      CARDIAC CATHETERIZATION  03/23/2021    100% RCA    COLONOSCOPY  2007?  CYSTOSCOPY Bilateral 12/19/2018    CYSTOSCOPY, BIOSPY FULGURATION OF BLADDER TUMOR POSSIBLE TURBT, RETROGRADE PYELOGRAM performed by Isadora Hopkins MD at [facility] COLONOSCOPY FLX DX W/COLLJ SPEC WHEN PFRMD N/A 09/11/2017    Dr Pradip Wolf internal hemorrhoids, diverticular disease-HP-No recall (age)   [employer] [place] REVISE MEDIAN N/CARPAL TUNNEL SURG Left 07/18/2018    OPEN CARPAL TUNNEL RELEASE performed by Patricia Fleming MD at [address] Left 08/28/2018    LEFT CAROTID ENDARTERECTOMY WITH VEIN PATCH ANGIOPLASTY AND COMPLETION ANGIOGRAM performed by Gretel Petty MD at [address] [facility] Left 10/15/2018    LEFT COMPLEX TOTAL KNEE ARTHROPLASTY performed by Patricia Fleming MD at [facility] VASCULAR SURGERY  04/21/2015    Dante BESS Ultrasound guided access of left common femoral artery. Aortogram.Diagnostic right lower extremity arteriogram.Radiologic supervision and interpretation.  VASCULAR SURGERY  01/13/2015    Dante Nathan M.D Atherectomy,angioplasty,and stenting of left superficial femoral artery.  VASCULAR SURGERY  03/11/2014    Dante Nathan M.D. Ultrasound-guided access of right common femoral artery. Aortogram.Left lower extremity arteriogram.Atherectomy and angioplasty of left superficial femoral artery. Radiologic supervision and interpretation.  VASCULAR SURGERY  2013    Amrik BESS Aortogram.Multistation arteriogram right lower extremity. Laser atherectomy and angioplasty of right superficial femoral artery. Selective catheterization of right tibioperoneal trunk. Angioplasty of peroneal artery and tibioperoneal trunk.  VASCULAR SURGERY  10/30/2018    [facility]. Ultrasound guided cannulation of right internal vein. Placement of right internal jugular vein tunneled dialysis catheter bard equistream xk 23cm tip to cuff    VASCULAR SURGERY  2018    SJS. Removal of tunneled dilaysis catheter right internal jugular vein. Family History  Family History   Problem Relation Age of Onset    Colon Cancer Father     Diabetes Brother     Colon Polyps Neg Hx     Liver Cancer Neg Hx     Liver Disease Neg Hx     Esophageal Cancer Neg Hx     Rectal Cancer Neg Hx     Stomach Cancer Neg Hx        Social History  Social History     Socioeconomic History    Marital status:      Spouse name: Not on file    Number of children: Not on file    Years of education: Not on file    Highest education level: Not on file   Occupational History    Not on file   Tobacco Use    Smoking status: Former Smoker     Quit date: 6/3/2003     Years since quittin.2    Smokeless tobacco: Never Used   Vaping Use    Vaping Use: Never used   Substance and Sexual Activity    Alcohol use:  Yes     Alcohol/week: 12.0 standard drinks     Types: 12 Glasses of wine per week     Comment: 2 glasses of wine every night    Drug use: No    Sexual activity: Yes     Partners: Female   Other Topics Concern    Not on file   Social History Narrative    Not on file     Social Determinants of Health     Financial Resource Strain:     Difficulty of Paying Living Expenses:    Food Insecurity:     Worried About Running Out of Food in the Last Year:     [address] in the Last Year: Transportation Needs:     Lack of Transportation (Medical):  Lack of Transportation (Non-Medical):    Physical Activity:     Days of Exercise per Week:     Minutes of Exercise per Session:    Stress:     Feeling of Stress :    Social Connections:     Frequency of Communication with Friends and Family:     Frequency of Social Gatherings with Friends and Family:     Attends [employer] Services:     Active Member of Clubs or Organizations:     Attends Club or Organization Meetings:     Marital Status:    Intimate Partner Violence:     Fear of Current or Ex-Partner:     Emotionally Abused:     Physically Abused:     Sexually Abused:          Review of Systems:  History obtained from chart review and the patient  General ROS: No fever or chills  Respiratory ROS: No cough, shortness of breath, wheezing  Cardiovascular ROS: No chest pain or palpitations  Gastrointestinal ROS: No abdominal pain or melena  Genito-Urinary ROS: No dysuria or hematuria  Musculoskeletal ROS: No weakness of muscles. 14 point ROS reviewed with the patient and negative except as noted above and in the HPI unless unable to obtain. Objective:  Patient Vitals for the past 24 hrs:   BP Temp Temp src Pulse Resp SpO2   08/28/21 1037 132/65 97.2 °F (36.2 °C) Temporal 58 20 95 %   08/28/21 0654 131/60 97.3 °F (36.3 °C) Temporal 61 20 96 %   08/28/21 0354 (!) 133/54 97.7 °F (36.5 °C) Temporal 63 18 94 %   08/28/21 0030 (!) 128/55 97.9 °F (36.6 °C) Temporal 59 18 94 %   08/27/21 1921 (!) 135/57 97.7 °F (36.5 °C) Temporal 59 18 94 %       Intake/Output Summary (Last 24 hours) at 8/28/2021 1120  Last data filed at 8/28/2021 0810  Gross per 24 hour   Intake 5469.82 ml   Output 1650 ml   Net 3819.82 ml     General: awake/alert    HEENT: Normocephalic atraumatic head  Neck: Supple with no JVD or carotid bruits.   Chest:  clear to auscultation bilaterally  CVS: regular rate and rhythm  Abdominal: soft, nontender, normal bowel sounds  Extremities: Skin tear of left lower extremity. Skin: warm and dry without rash      Labs:  BMP:   Recent Labs     08/26/21 0340 08/26/21 0340 08/27/21 0335 08/28/21 0433     --  138 137   K 4.0   < > 4.2  4.2 4.0  4.0   CL 94*  --  100 102   CO2 32*  --  28 25   BUN 42*  --  39* 36*   CREATININE 3.5*  --  3.1* 2.7*   CALCIUM 8.7*  --  8.1* 8.0*    < > = values in this interval not displayed. CBC:   Recent Labs     08/26/21 0340 08/27/21 0335 08/28/21 0433   WBC 8.3 6.9 6.8   HGB 10.4* 9.6* 9.6*   HCT 33.2* 30.5* 29.8*   MCV 91.7 93.3 94.3*    124* 120*     LIVER PROFILE:   Recent Labs     08/26/21 0340 08/27/21 0335 08/28/21 0433   AST 19 19 14   ALT 18 19 17   BILITOT 0.4 0.4 0.4   ALKPHOS 99 93 99     PT/INR: No results for input(s): PROTIME, INR in the last 72 hours. APTT: No results for input(s): APTT in the last 72 hours. BNP:  No results for input(s): BNP in the last 72 hours. Ionized Calcium:No results for input(s): IONCA in the last 72 hours. Magnesium:No results for input(s): MG in the last 72 hours. Phosphorus:No results for input(s): PHOS in the last 72 hours. HgbA1C:   Recent Labs     08/27/21 0335   LABA1C 6.3*     Hepatic:   Recent Labs     08/26/21 0340 08/27/21 0335 08/28/21 0433   ALKPHOS 99 93 99   ALT 18 19 17   AST 19 19 14   PROT 5.1* 4.9* 5.3*   BILITOT 0.4 0.4 0.4   LABALBU 3.5 3.2* 3.2*     Lactic Acid: No results for input(s): LACTA in the last 72 hours. Troponin:   Recent Labs     08/26/21 0340   CKTOTAL 56     ABGs: No results for input(s): PH, PCO2, PO2, HCO3, O2SAT in the last 72 hours. CRP:  No results for input(s): CRP in the last 72 hours. Sed Rate:  No results for input(s): SEDRATE in the last 72 hours. Cultures:   No results for input(s): CULTURE in the last 72 hours. No results for input(s): BC, Gopal Tenorio in the last 72 hours. No results for input(s): CXSURG in the last 72 hours.     Radiology reports as per the Radiologist  Radiology: XR TIBIA FIBULA LEFT (2 VIEWS)    Result Date: 8/25/2021  EXAM: XR TIBIA FIBULA LEFT (2 VIEWS) - 8/25/2021 6:33 PM INDICATION: Laceration, pain COMPARISON: 11/12/2020 FINDINGS: RIGHT knee arthroplasty without evidence of complication. Patellar height is anatomic. No acute fracture or dislocation. Vascular calcification and SFA/popliteal artery stents noted. Metallic foreign bodies in the prepatellar soft tissues are unchanged from prior exam. Skin staple lines in the anterior soft tissues are noted. No radiopaque foreign bodies at these skin staple lines or surrounding tissues. No definite underlying osseous injury at these sites. No acute osseous findings. Signed by Dr Kimberli Chávez RENAL COMPLETE    Result Date: 8/26/2021  Examination. US RENAL COMPLETE 8/26/2021 9:41 AM History: Acute renal failure. The ultrasound examination of the kidneys bilaterally is performed. The comparison is made with the previous study dated 2/13/2021. The correlation made with CT scan of the abdomen dated 7/31/2020. The right kidney measures 11.2 x 5.2 x 6.4 cm. There are 2 echolucent nodules in the mid and lower pole of the right kidney measuring 1.5 x 1.3 x 1.2 cm and 2.8 x 3.2 x 3.1 cm. Both have moderately increased since the previous study. There is no intrinsic septation or mural nodules. There is no evidence of hydronephrosis. The remaining renal cortex measures 1.5 cm. Left kidney measures 10.9 x 5.7 x 6 cm. Previously seen small echolucent nodule in the lower pole is not visualized in this study. No hydronephrosis. There is normal corticomedullary differentiation. Renal cortex measures 1.3 cm. The urinary bladder is moderately distended. No intrinsic abnormality. A mild increase in size of the right renal cysts since the previous study. No hydronephrosis on either side. Signed by Dr Lovetta Shone    Result Date: 8/26/2021  Examination.  XR CHEST PORTABLE 8/26/2021 12:07 PM History: Shortness of breath. A frontal portable upright view of the chest is compared with the previous study dated 3/22/2021. There is resolution of the pulmonary congestion and bilateral interstitial infiltrate. There are atelectatic changes at bilateral bases. There is moderate asymmetrical elevation right diaphragm which is similar to the previous study. There is no pleural effusion. No pneumothorax. The heart size is not evaluated due to the portable projection. Atheromatous changes thoracic aorta are noted. A dual-chamber cardiac pacer is seen introduced through the left subclavian vein. No change. No acute bony abnormality. Bilateral lower lung atelectatic changes. Resolution of the pulmonary vascular congestion and infiltrate since the previous study in March 2021. Signed by Dr Mumtaz Morales   1. Acute kidney injury stage I/ improving. 2.  Prerenal azotemia/low Fena  3. Stage IV chronic kidney disease baseline. 4.  Type II diabetic nephropathy. 5.  Severe hypoglycemia now resolved. Plan:  1. Advised him to drink at least 60 ounces of water a day. .  2.  Okay to be discharged and follow-up needed in 2 weeks.        Suman Griggs MD  08/28/21  11:20 AM

## 2021-08-28 NOTE — DISCHARGE INSTR - DIET

## 2021-08-28 NOTE — PROGRESS NOTES
Sn review discharge teaching with patient using teach back method. Patient denied any questions at this time and stated family would assist with transport home.      Electronically signed by Dewayne Car RN on 8/28/2021 at 4:16 PM

## 2021-08-28 NOTE — DISCHARGE SUMMARY
Discharge Summary      Daryle Connors  :  1940  MRN:  [id number]    Admit date:  2021  Discharge date:      Admitting Physician:  Aleks Torres MD    Advance Directive: Full Code    Consults: nephrology     Primary Care Physician:  Juan Daniel Bowen MD    Discharge Diagnoses:  Principal Problem:    Hypoglycemia  Active Problems:    GERD (gastroesophageal reflux disease)    Type 2 diabetes mellitus with complication, without long-term current use of insulin ([address]    Chronic kidney disease    Acute kidney injury superimposed on CKD ([address]    Constipation  Resolved Problems:    * No resolved hospital problems. *      Significant Diagnostic Studies:   XR TIBIA FIBULA LEFT (2 VIEWS)    Result Date: 2021  EXAM: XR TIBIA FIBULA LEFT (2 VIEWS) - 2021 6:33 PM INDICATION: Laceration, pain COMPARISON: 2020 FINDINGS: RIGHT knee arthroplasty without evidence of complication. Patellar height is anatomic. No acute fracture or dislocation. Vascular calcification and SFA/popliteal artery stents noted. Metallic foreign bodies in the prepatellar soft tissues are unchanged from prior exam. Skin staple lines in the anterior soft tissues are noted. No radiopaque foreign bodies at these skin staple lines or surrounding tissues. No definite underlying osseous injury at these sites. No acute osseous findings. Signed by Dr Thong Robertson RENAL COMPLETE    Result Date: 2021  Examination. US RENAL COMPLETE 2021 9:41 AM History: Acute renal failure. The ultrasound examination of the kidneys bilaterally is performed. The comparison is made with the previous study dated 2021. The correlation made with CT scan of the abdomen dated 2020. The right kidney measures 11.2 x 5.2 x 6.4 cm. There are 2 echolucent nodules in the mid and lower pole of the right kidney measuring 1.5 x 1.3 x 1.2 cm and 2.8 x 3.2 x 3.1 cm. Both have moderately increased since the previous study.  There is no intrinsic septation or mural nodules. There is no evidence of hydronephrosis. The remaining renal cortex measures 1.5 cm. Left kidney measures 10.9 x 5.7 x 6 cm. Previously seen small echolucent nodule in the lower pole is not visualized in this study. No hydronephrosis. There is normal corticomedullary differentiation. Renal cortex measures 1.3 cm. The urinary bladder is moderately distended. No intrinsic abnormality. A mild increase in size of the right renal cysts since the previous study. No hydronephrosis on either side. Signed by Dr Jena Grayd    Result Date: 8/26/2021  Examination. XR CHEST PORTABLE 8/26/2021 12:07 PM History: Shortness of breath. A frontal portable upright view of the chest is compared with the previous study dated 3/22/2021. There is resolution of the pulmonary congestion and bilateral interstitial infiltrate. There are atelectatic changes at bilateral bases. There is moderate asymmetrical elevation right diaphragm which is similar to the previous study. There is no pleural effusion. No pneumothorax. The heart size is not evaluated due to the portable projection. Atheromatous changes thoracic aorta are noted. A dual-chamber cardiac pacer is seen introduced through the left subclavian vein. No change. No acute bony abnormality. Bilateral lower lung atelectatic changes. Resolution of the pulmonary vascular congestion and infiltrate since the previous study in March 2021. Signed by Dr Dandre Hearn:   CBC:   Recent Labs     08/26/21  0340 08/27/21  0335 08/28/21  0433   WBC 8.3 6.9 6.8   HGB 10.4* 9.6* 9.6*    124* 120*     BMP:    Recent Labs     08/26/21  0340 08/26/21  0340 08/27/21  0335 08/28/21  0433     --  138 137   K 4.0   < > 4.2  4.2 4.0  4.0   CL 94*  --  100 102   CO2 32*  --  28 25   BUN 42*  --  39* 36*   CREATININE 3.5*  --  3.1* 2.7*   GLUCOSE 139*  --  147* 171*    < > = values in this interval not displayed.      INR: discharge home to follow-up with PCP and nephrology as an outpatient. He has been advised to drink a least 60 ounces of water daily by nephrology and I also discussed this with him. Home health has been ordered for the patient. He can follow-up as an outpatient for further imaging of renal cysts noted on ultrasound.     Physical Exam:  Vitals: /65   Pulse 58   Temp 97.2 °F (36.2 °C) (Temporal)   Resp 20   Ht 6' (1.829 m)   Wt 226 lb 14.4 oz (102.9 kg)   SpO2 95%   BMI 30.77 kg/m²   24HR INTAKE/OUTPUT:      Intake/Output Summary (Last 24 hours) at 8/28/2021 1212  Last data filed at 8/28/2021 5717  Gross per 24 hour   Intake 4989.82 ml   Output 1650 ml   Net 3339.82 ml     General appearance: Alert  HEENT: AT/NC  Lungs: no increased work of breathing, BLAE  Heart: RRR, no murmurs appreciated  Abdomen: BS+, soft, NT, ND  Extremities: pulses+  Neurologic: Alert, gross motor function intact  Skin: warm      Discharge Medications:       Alma Hsieh   Home Medication Instructions [id number]    Printed on:08/28/21 1212   Medication Information                      amiodarone (CORDARONE) 200 MG tablet  Take 1 tablet by mouth daily             aspirin 81 MG EC tablet  Take 1 tablet by mouth daily             atorvastatin (LIPITOR) 20 MG tablet  Take 1 tablet by mouth daily             bisacodyl (DULCOLAX) 5 MG EC tablet  Take 1 tablet by mouth daily as needed for Constipation             ferrous sulfate (IRON 325) 325 (65 Fe) MG tablet  Take 1 tablet by mouth 2 times daily (with meals)             folic acid (FOLVITE) 1 MG tablet  Take 1 tablet by mouth daily             guaiFENesin (ROBITUSSIN) 100 MG/5ML syrup  Take 10 mLs by mouth every 4 hours as needed for Cough             linaclotide (LINZESS) 145 MCG capsule  Take 1 capsule by mouth every morning (before breakfast)             metoprolol succinate (TOPROL XL) 100 MG extended release tablet  Take 1 tablet by mouth daily miconazole (MICOTIN) 2 % powder  Apply topically 2 times daily. mirabegron (MYRBETRIQ) 25 MG TB24  Take 1 tablet by mouth daily             NIFEdipine (ADALAT CC) 60 MG extended release tablet  Take 1 tablet by mouth daily             OXYGEN  Inhale 2 L into the lungs continuous             pantoprazole (PROTONIX) 40 MG tablet  Take 1 tablet by mouth daily             sennosides-docusate sodium (SENOKOT-S) 8.6-50 MG tablet  Take 1 tablet by mouth 2 times daily             tamsulosin (FLOMAX) 0.4 MG capsule  Take 1 capsule by mouth 2 times daily             trospium (SANCTURA) 20 MG tablet  Take 1 tablet by mouth daily             vitamin D (ERGOCALCIFEROL) 1.25 MG (11113 UT) CAPS capsule  Take 1 capsule by mouth once a week                 Discharge Instructions: Follow up with Nik Goodson MD in 7 days. Take medications as directed. Resume activity as tolerated. Diet: ADULT DIET; Regular; No Added Salt (3-4 gm)     Disposition: Patient is medically stable and will be discharged Home. Time spent on discharge 35 minutes.     Signed:  Karlee Flores MD 8/28/2021 12:12 PM

## 2021-08-29 LAB
EKG P AXIS: NORMAL DEGREES
EKG P-R INTERVAL: NORMAL MS
EKG Q-T INTERVAL: 492 MS
EKG QRS DURATION: 180 MS
EKG QTC CALCULATION (BAZETT): 493 MS
EKG T AXIS: 73 DEGREES

## 2021-08-30 NOTE — CARE COORDINATION
Patient set up with [facility]  Electronically signed by Anjum Da Silva on 8/30/21 at 9:54 AM CDT

## 2021-09-07 NOTE — PROGRESS NOTES
Physician Progress Note      PATIENT:               Phil Mendez  CSN #:                  [id number]  :                       1940  ADMIT DATE:       2021 5:53 PM  [address] [place] DATE:        2021 2:19 PM  RESPONDING  PROVIDER #:        Tori Cooper MD          QUERY TEXT:    Patient admitted with hypoglycemia. Noted documentation of ANA . In order to   support the diagnosis of ANA, please include additional clinical indicators in   your documentation. Or please document if the diagnosis of ANA has been   ruled out after further study. The medical record reflects the following:  Risk Factors: CKD, DMII  Clinical Indicators: 3.3 and 2.7 GFR 18 to 23  Treatment: IVF 1 liter bolus,  ml/hr    Thank you    Deidre Bush RN, BSN, [facility]  [phone]  Options provided:  -- ANA  present as evidenced by, Please document evidence.   -- ANA  was ruled out  -- Other - I will add my own diagnosis  -- Disagree - Not applicable / Not valid  -- Disagree - Clinically unable to determine / Unknown  -- Refer to Clinical Documentation Reviewer    PROVIDER RESPONSE TEXT:    ANA is present as evidenced by worsened renal function over baseline    Query created by: Jannice Hammans on 2021 9:23 AM      Electronically signed by:  Tori Cooper MD 2021 7:24 PM

## 2021-09-09 RX ORDER — AMIODARONE HYDROCHLORIDE 200 MG/1
200 TABLET ORAL DAILY
Qty: 30 TABLET | Refills: 3 | Status: SHIPPED | OUTPATIENT
Start: 2021-09-09 | End: 2022-01-18

## 2021-10-21 ENCOUNTER — OFFICE VISIT (OUTPATIENT)
Dept: CARDIOLOGY CLINIC | Age: 81
End: 2021-10-21
Payer: MEDICARE

## 2021-10-21 VITALS
SYSTOLIC BLOOD PRESSURE: 132 MMHG | HEART RATE: 59 BPM | DIASTOLIC BLOOD PRESSURE: 62 MMHG | BODY MASS INDEX: 31.97 KG/M2 | HEIGHT: 72 IN | WEIGHT: 236 LBS

## 2021-10-21 DIAGNOSIS — I47.29 NONSUSTAINED VENTRICULAR TACHYCARDIA: Primary | ICD-10-CM

## 2021-10-21 DIAGNOSIS — R06.02 SOB (SHORTNESS OF BREATH): ICD-10-CM

## 2021-10-21 DIAGNOSIS — I48.91 ATRIAL FIBRILLATION, UNSPECIFIED TYPE (HCC): ICD-10-CM

## 2021-10-21 DIAGNOSIS — Z95.0 PACEMAKER: ICD-10-CM

## 2021-10-21 DIAGNOSIS — E78.00 PURE HYPERCHOLESTEROLEMIA: ICD-10-CM

## 2021-10-21 DIAGNOSIS — I50.32 CHRONIC DIASTOLIC CONGESTIVE HEART FAILURE (HCC): ICD-10-CM

## 2021-10-21 DIAGNOSIS — I10 ESSENTIAL HYPERTENSION: ICD-10-CM

## 2021-10-21 PROCEDURE — 99213 OFFICE O/P EST LOW 20 MIN: CPT | Performed by: INTERNAL MEDICINE

## 2021-10-21 PROCEDURE — G8427 DOCREV CUR MEDS BY ELIG CLIN: HCPCS | Performed by: INTERNAL MEDICINE

## 2021-10-21 PROCEDURE — G8484 FLU IMMUNIZE NO ADMIN: HCPCS | Performed by: INTERNAL MEDICINE

## 2021-10-21 PROCEDURE — G8417 CALC BMI ABV UP PARAM F/U: HCPCS | Performed by: INTERNAL MEDICINE

## 2021-10-21 PROCEDURE — 1036F TOBACCO NON-USER: CPT | Performed by: INTERNAL MEDICINE

## 2021-10-21 PROCEDURE — 4040F PNEUMOC VAC/ADMIN/RCVD: CPT | Performed by: INTERNAL MEDICINE

## 2021-10-21 PROCEDURE — 1123F ACP DISCUSS/DSCN MKR DOCD: CPT | Performed by: INTERNAL MEDICINE

## 2021-10-21 RX ORDER — BUMETANIDE 1 MG/1
2 TABLET ORAL DAILY
Status: ON HOLD | COMMUNITY
End: 2022-05-26 | Stop reason: SDUPTHER

## 2021-10-21 ASSESSMENT — ENCOUNTER SYMPTOMS
VOMITING: 0
DIARRHEA: 0
GASTROINTESTINAL NEGATIVE: 1
RESPIRATORY NEGATIVE: 1
EYES NEGATIVE: 1
SHORTNESS OF BREATH: 0
NAUSEA: 0

## 2021-10-21 NOTE — PROGRESS NOTES
[facility]                            Date:  10/21/2021  Patient: Iftikhar Lee  Age:  [de-identified] y.o., 1940      Reason for evaluation:         SUBJECTIVE:    Returns today follow-up assessment followed for atrial fibrillation chronic diastolic heart failure hyperlipidemia hypertension. Overall feels reasonably well. Denies chest pain dyspnea stable denies palpitations no other complaints or issues reported. Blood pressure 132/62 heart 59. Review of Systems   Constitutional: Negative. Negative for chills, fever and unexpected weight change. HENT: Negative. Eyes: Negative. Respiratory: Negative. Negative for shortness of breath. Cardiovascular: Negative. Negative for chest pain. Gastrointestinal: Negative. Negative for diarrhea, nausea and vomiting. Endocrine: Negative. Genitourinary: Negative. Musculoskeletal: Negative. Skin: Negative. Neurological: Negative. All other systems reviewed and are negative. OBJECTIVE:     /62   Pulse 59   Ht 6' (1.829 m)   Wt 236 lb (107 kg)   BMI 32.01 kg/m²     Labs:   CBC: No results for input(s): WBC, HGB, HCT, PLT in the last 72 hours. BMP:No results for input(s): NA, K, CO2, BUN, CREATININE, LABGLOM, GLUCOSE in the last 72 hours. BNP: No results for input(s): BNP in the last 72 hours. PT/INR: No results for input(s): PROTIME, INR in the last 72 hours. APTT:No results for input(s): APTT in the last 72 hours. CARDIAC ENZYMES:No results for input(s): CKTOTAL, CKMB, CKMBINDEX, TROPONINI in the last 72 hours. FASTING LIPID PANEL:  Lab Results   Component Value Date    HDL 38 10/01/2018    LDLCALC 72 10/01/2018    TRIG 176 10/01/2018     LIVER PROFILE:No results for input(s): AST, ALT, LABALBU in the last 72 hours.         Past Medical History:   Diagnosis Date    Acute liver failure without hepatic coma 10/23/2018    Back pain     \"with tired legs as a result\"    Bladder cancer ([facility] [address] 12/19/2018    Blood circulation, collateral     Carotid arterial disease (HCC)     recent surgery    CKD (chronic kidney disease), 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/2018    COPD with acute lower respiratory infection ([facility] [address] 2/24/2021    GERD (gastroesophageal reflux disease)     Hyperlipidemia     Hypertension     Hypertension     Palliative care patient 10/23/2018    Pneumonia due to infectious organism 11/06/2018    Primary osteoarthritis of left knee 10/14/2018    PVD (peripheral vascular disease) (HCC)     Tremor     Tremor on Right side x 1-2 weeks per stepdaughter    Type 2 diabetes mellitus with complication, without long-term current use of insulin ([facility] [address] 1/21/2021     Past Surgical History:   Procedure Laterality Date    BACK SURGERY      CARDIAC CATHETERIZATION  03/23/2021    100% RCA    COLONOSCOPY  2007?  CYSTOSCOPY Bilateral 12/19/2018    CYSTOSCOPY, BIOSPY FULGURATION OF BLADDER TUMOR POSSIBLE TURBT, RETROGRADE PYELOGRAM performed by Jazmin Hoskins MD at [facility] COLONOSCOPY FLX DX W/COLLJ SPEC WHEN PFRMD N/A 09/11/2017    Dr Cary Leo internal hemorrhoids, diverticular disease-HP-No recall (age)   [employer] [place] REVISE MEDIAN N/CARPAL TUNNEL SURG Left 07/18/2018    OPEN CARPAL TUNNEL RELEASE performed by Lorrie Guevara MD at [address] Left 08/28/2018    LEFT CAROTID ENDARTERECTOMY WITH VEIN PATCH ANGIOPLASTY AND COMPLETION ANGIOGRAM performed by Claire Woods MD at [address] 10/15/2018    LEFT COMPLEX TOTAL KNEE ARTHROPLASTY performed by Lorrie Guevara MD at [facility] VASCULAR SURGERY  04/21/2015    Apollo BESS Ultrasound guided access of left common femoral artery. Aortogram.Diagnostic right lower extremity arteriogram.Radiologic supervision and interpretation.     VASCULAR SURGERY  01/13/2015    Apollo Shah M.D Atherectomy,angioplasty,and stenting of left superficial femoral artery.  VASCULAR SURGERY  03/11/2014    Gerald Olguin M.D. Ultrasound-guided access of right common femoral artery. Aortogram.Left lower extremity arteriogram.Atherectomy and angioplasty of left superficial femoral artery. Radiologic supervision and interpretation.  VASCULAR SURGERY  01/18/2013    Gerald BESS Aortogram.Multistation arteriogram right lower extremity. Laser atherectomy and angioplasty of right superficial femoral artery. Selective catheterization of right tibioperoneal trunk. Angioplasty of peroneal artery and tibioperoneal trunk.  VASCULAR SURGERY  10/30/2018    [facility]. Ultrasound guided cannulation of right internal vein. Placement of right internal jugular vein tunneled dialysis catheter bard equistream xk 23cm tip to cuff    VASCULAR SURGERY  12/17/2018    [facility]. Removal of tunneled dilaysis catheter right internal jugular vein. Family History   Problem Relation Age of Onset    Colon Cancer Father     Diabetes Brother     Colon Polyps Neg Hx     Liver Cancer Neg Hx     Liver Disease Neg Hx     Esophageal Cancer Neg Hx     Rectal Cancer Neg Hx     Stomach Cancer Neg Hx      Allergies   Allergen Reactions    Eliquis [Apixaban] Other (See Comments)     \"almost bled to death\"    Promethazine Hcl Other (See Comments)     He became encephalopathic for several hours and was not responsive.      Current Outpatient Medications   Medication Sig Dispense Refill    bumetanide (BUMEX) 1 MG tablet Take 1 mg by mouth daily      amiodarone (CORDARONE) 200 MG tablet TAKE 1 TABLET BY MOUTH DAILY 30 tablet 3    mirabegron (MYRBETRIQ) 25 MG TB24 Take 1 tablet by mouth daily 90 tablet 3    trospium (SANCTURA) 20 MG tablet Take 1 tablet by mouth daily 90 tablet 3    aspirin 81 MG EC tablet Take 1 tablet by mouth daily 30 tablet 3    atorvastatin (LIPITOR) 20 MG tablet Take 1 tablet by mouth daily 30 tablet 0    metoprolol succinate (TOPROL XL) 100 MG extended release tablet Take 1 tablet by mouth daily 30 tablet 3    NIFEdipine (ADALAT CC) 60 MG extended release tablet Take 1 tablet by mouth daily 30 tablet 3    guaiFENesin (ROBITUSSIN) 100 MG/5ML syrup Take 10 mLs by mouth every 4 hours as needed for Cough 118 mL 0    miconazole (MICOTIN) 2 % powder Apply topically 2 times daily. 45 g 1    ferrous sulfate (IRON 325) 325 (65 Fe) MG tablet Take 1 tablet by mouth 2 times daily (with meals) 30 tablet 3    folic acid (FOLVITE) 1 MG tablet Take 1 tablet by mouth daily 30 tablet 3    bisacodyl (DULCOLAX) 5 MG EC tablet Take 1 tablet by mouth daily as needed for Constipation 30 tablet 0    tamsulosin (FLOMAX) 0.4 MG capsule Take 1 capsule by mouth 2 times daily 60 capsule 3    linaclotide (LINZESS) 145 MCG capsule Take 1 capsule by mouth every morning (before breakfast) 30 capsule 0    pantoprazole (PROTONIX) 40 MG tablet Take 1 tablet by mouth daily 30 tablet 3    vitamin D (ERGOCALCIFEROL) 1.25 MG (03846 UT) CAPS capsule Take 1 capsule by mouth once a week 5 capsule 0    OXYGEN Inhale 2 L into the lungs continuous 1 Container 5     No current facility-administered medications for this visit. Social History     Socioeconomic History    Marital status:      Spouse name: Not on file    Number of children: Not on file    Years of education: Not on file    Highest education level: Not on file   Occupational History    Not on file   Tobacco Use    Smoking status: Former Smoker     Quit date: 6/3/2003     Years since quittin.3    Smokeless tobacco: Never Used   Vaping Use    Vaping Use: Never used   Substance and Sexual Activity    Alcohol use:  Yes     Alcohol/week: 12.0 standard drinks     Types: 12 Glasses of wine per week     Comment: 2 glasses of wine every night    Drug use: No    Sexual activity: Yes     Partners: Female   Other Topics Concern    Not on file   Social History Narrative    Not on file     Social Determinants of Health     Financial Resource Strain:    Megan Herrera Difficulty of Paying Living Expenses:    Food Insecurity:     Worried About Running Out of Food in the Last Year:     [address] in the Last Year:    Transportation Needs:     Lack of Transportation (Medical):  Lack of Transportation (Non-Medical):    Physical Activity:     Days of Exercise per Week:     Minutes of Exercise per Session:    Stress:     Feeling of Stress :    Social Connections:     Frequency of Communication with Friends and Family:     Frequency of Social Gatherings with Friends and Family:     Attends Mu-ism Services:     Active Member of Clubs or Organizations:     Attends Club or Organization Meetings:     Marital Status:    Intimate Partner Violence:     Fear of Current or Ex-Partner:     Emotionally Abused:     Physically Abused:     Sexually Abused:        Physical Examination:  /62   Pulse 59   Ht 6' (1.829 m)   Wt 236 lb (107 kg)   BMI 32.01 kg/m²   Physical Exam  Vitals reviewed. Constitutional:       Appearance: He is well-developed. Neck:      Vascular: No carotid bruit or JVD. Cardiovascular:      Rate and Rhythm: Normal rate and regular rhythm. Heart sounds: Normal heart sounds. No murmur heard. No friction rub. No gallop. Pulmonary:      Effort: Pulmonary effort is normal. No respiratory distress. Breath sounds: Normal breath sounds. No wheezing or rales. Abdominal:      General: There is no distension. Tenderness: There is no abdominal tenderness. Lymphadenopathy:      Cervical: No cervical adenopathy. Skin:     General: Skin is warm and dry. ASSESSMENT:     Diagnosis Orders   1. Nonsustained ventricular tachycardia ([address]     2. Atrial fibrillation, unspecified type ([address]     3. Essential hypertension     4. Pacemaker     5. Chronic diastolic congestive heart failure ([address]     6. Pure hypercholesterolemia     7.  SOB (shortness of breath)         PLAN:  No orders of the defined types were placed in this

## 2021-10-27 ENCOUNTER — OFFICE VISIT (OUTPATIENT)
Dept: PALLATIVE CARE | Age: 81
End: 2021-10-27
Payer: MEDICARE

## 2021-10-27 DIAGNOSIS — J44.9 CHRONIC OBSTRUCTIVE PULMONARY DISEASE, UNSPECIFIED COPD TYPE (HCC): Primary | ICD-10-CM

## 2021-10-27 DIAGNOSIS — E11.8 TYPE 2 DIABETES MELLITUS WITH COMPLICATION, WITHOUT LONG-TERM CURRENT USE OF INSULIN (HCC): ICD-10-CM

## 2021-10-27 DIAGNOSIS — I50.32 CHRONIC DIASTOLIC CONGESTIVE HEART FAILURE (HCC): ICD-10-CM

## 2021-10-27 DIAGNOSIS — Z51.5 ENCOUNTER FOR PALLIATIVE CARE: ICD-10-CM

## 2021-10-27 DIAGNOSIS — J96.11 CHRONIC RESPIRATORY FAILURE WITH HYPOXIA AND HYPERCAPNIA (HCC): ICD-10-CM

## 2021-10-27 DIAGNOSIS — J96.12 CHRONIC RESPIRATORY FAILURE WITH HYPOXIA AND HYPERCAPNIA (HCC): ICD-10-CM

## 2021-10-27 PROCEDURE — 1123F ACP DISCUSS/DSCN MKR DOCD: CPT | Performed by: NURSE PRACTITIONER

## 2021-10-27 PROCEDURE — 99349 HOME/RES VST EST MOD MDM 40: CPT | Performed by: NURSE PRACTITIONER

## 2021-10-27 PROCEDURE — G8482 FLU IMMUNIZE ORDER/ADMIN: HCPCS | Performed by: NURSE PRACTITIONER

## 2021-10-27 PROCEDURE — G8417 CALC BMI ABV UP PARAM F/U: HCPCS | Performed by: NURSE PRACTITIONER

## 2021-10-27 PROCEDURE — 4040F PNEUMOC VAC/ADMIN/RCVD: CPT | Performed by: NURSE PRACTITIONER

## 2021-10-27 PROCEDURE — 1036F TOBACCO NON-USER: CPT | Performed by: NURSE PRACTITIONER

## 2021-10-27 RX ORDER — NIFEDIPINE 60 MG/1
TABLET, EXTENDED RELEASE ORAL
COMMUNITY
Start: 2021-10-26 | End: 2021-10-27

## 2021-10-27 RX ORDER — ASPIRIN 81 MG
TABLET, DELAYED RELEASE (ENTERIC COATED) ORAL
COMMUNITY
Start: 2021-10-26

## 2021-10-27 RX ORDER — GLIPIZIDE 10 MG/1
2.5 TABLET, FILM COATED, EXTENDED RELEASE ORAL DAILY
Status: ON HOLD | COMMUNITY
Start: 2021-10-19 | End: 2022-05-31 | Stop reason: HOSPADM

## 2021-10-27 RX ORDER — DEXLANSOPRAZOLE 60 MG/1
60 CAPSULE, DELAYED RELEASE ORAL DAILY
COMMUNITY
End: 2022-02-16

## 2021-10-27 RX ORDER — ONDANSETRON 4 MG/1
TABLET, FILM COATED ORAL
COMMUNITY
Start: 2021-10-01 | End: 2021-11-04 | Stop reason: ALTCHOICE

## 2021-10-27 ASSESSMENT — ENCOUNTER SYMPTOMS
VOMITING: 0
TROUBLE SWALLOWING: 0
DIARRHEA: 0
COUGH: 0
BACK PAIN: 1
CHEST TIGHTNESS: 0
WHEEZING: 0
CONSTIPATION: 1

## 2021-10-27 NOTE — PROGRESS NOTES
[facility]   Follow Up Note      Patient Name:  81392Maxine Husain Record Number:  [id number]  YOB: 1940    Date of Visit: 10/27/2021  Location of Visit:  [x]  Home    []  Other:      Care Team Members:   Dr. Julio Cesar Wilson - PCP  Dr. Mir Lundy - pulmonology  Dr. Bernard Ni - cardiology  Dr. Amada Taylor - urology  Dr. Ariel Laird - nephrology  Ken Mueller Hematology    History obtained from:  patient, spouse, electronic medical record    Christiano Kaplan / RECOMMENDATIONS/PLAN:   Sanjana Cooper was seen today for follow-up. Diagnoses and all orders for this visit:    Chronic obstructive pulmonary disease, unspecified COPD type ([facility] [address]    Chronic respiratory failure with hypoxia and hypercapnia (HCC)    Chronic diastolic congestive heart failure ([facility] [address]    Type 2 diabetes mellitus with complication, without long-term current use of insulin ([facility] [address]    Encounter for palliative care    Continue Trilogy  Goals of care remain unchanged. He wants to maintain function and quality of life. He is satisfied with is current care and no longer plans to seek eval at [facility]. He does want to be hospitalized if needed. No immediate needs identified today. I will see him as needed  CHIEF COMPLAINT:     Chief Complaint   Patient presents with    Follow-up     Palliative care     CLINICAL SUMMARY:        Mr. Talia Sewell is a [de-identified]year old male with PMH of COPD, CKD stage III, diabetes, HTN, GERD, hyperlipidemia, PVD,  CAD,  chronic back pain, osteoarthritis, diverticulitis, bladder cancer and more recently recurrent pneumonia, NSVT, sinus node dysfunction with prolonged pauses requiring pacemaker placement, chronic respiratory failure and now requiring Trilogy use at night. He has had four admissions between 1/17/2021 and 3/29/2021.  These visits are summarized in initial consultation note dated 4/2/2021    Ad[facility] to [facility]/25-8/28/2021 due to hypoglycemia after several days of not eating due to constipation and remaining on antidiabetic meds. Had ANA on CKD with creatinine 3.3 which improved with IV fluids. HgbA1C was 6.3 and home diabetic meds discontinued. HPI AND VISIT SUMMARY:   I saw Mr. Dexter Justin in his home. His wife was present and participated in the visit. Upon my arrival he was noted to be awake, alert, sitting in recliner in the living room, in no acute distress. We discussed his recent hospitalization. He has an upcoming appointment with GI. He will see cardiology tomorrow. He is able to be more active than when I saw him in the spring. He has been going out to eat and visiting with friends. His wife previously took care of his medications. He is now doing it on his own. Goals of care remain unchanged. He wants to maintain function and quality of life. He is satisfied with is current care and no longer plans to seek eval at [facility].       ADVANCED CARE PLANNING:                                            Surrogate Decision Maker:  Primary Decision Maker: Avni Che - Child - [phone]    Durable Power of :no    Advanced Directives/Living Jung: yes  On file    Out of hospital medical orders in place to reflect resuscitation status: no    CLINICAL PAIN ASSESSMENT:     Score 1-10 (if verbal): 0    FUNCTIONAL ASSESSMENT:     Palliative Performance Scale: 50%    HISTORY:     Past medical history, surgical history, family history, social history unchanged     Current Medications:      bumetanide (BUMEX) 1 MG tablet, Take 1 mg by mouth 2 times daily, Disp: , Rfl:     glipiZIDE (GLUCOTROL) 5 MG tablet, TAKE 1 TABLET BY MOUTH EVERY MORNING BEFORE BREAKFAST, Disp: 60 tablet, Rfl: 0    aspirin 81 MG EC tablet, Take 1 tablet by mouth daily, Disp: 30 tablet, Rfl: 3    calcium carbonate (TUMS) 500 MG chewable tablet, Take 1 tablet by mouth 3 times daily as needed for Heartburn (Patient not taking: Reported on 4/2/2021), Disp: 60 tablet, Respiratory: Negative for cough, chest tightness and wheezing. Cardiovascular: Positive for leg swelling. Negative for chest pain and palpitations. Gastrointestinal: Positive for constipation (controlled. Takes Miralax if needed. ). Negative for diarrhea and vomiting. Some reflux/heartburn occasionally   Genitourinary: Positive for frequency (due to diuretic). Negative for difficulty urinating. Musculoskeletal: Positive for back pain (low back- chronic). Neurological: Positive for weakness and numbness (neuropathy feet). Psychiatric/Behavioral: Negative. OBJECTIVE:     Vitals:    10/27/21 1133   BP: (!) 120/56   Pulse: 60   Resp: 18   Temp: 97.3 °F (36.3 °C)   SpO2: 92%     General appearance: alert and cooperative with exam, vital signs stable, well nourished, well developed  HEENT: atraumatic, sclera clear,  pupils and irises normal, external ears and nose are normal, lips normal.  Neck:  supple  Lungs: equal bilateral expansion, lungs clear, diminished bilaterally  Heart:  regular rate and rhythm, S1 S2 normal  Abdomen:  soft, non-tender, bowel sounds active  Extremities:  no cyanosis, clubbing, 1+ lower extremity and pedal edema  Neurologic: no focal neurologic deficits,alert and oriented   Psychiatric:  no recent or remote memory deficits, mood and affect appropriate  Skin: warm, dry     Established Patient Visit Time: 40 minutes, Greater than 50% of the time in this visit was spent counseling and coordinating care of this patient.      Electronically signed by ELISABETH Orona CNP on 12/6/2021 at 8:23 AM

## 2021-11-05 NOTE — PROGRESS NOTES
Patient Education        Attention Deficit Hyperactivity Disorder (ADHD) in Children: Care Instructions  Your Care Instructions     Children with attention deficit hyperactivity disorder (ADHD) often have problems paying attention and focusing on tasks. They sometimes act without thinking. Some children also fidget or cannot sit still and have lots of energy. This common disorder can continue into adulthood. The exact cause of ADHD is not clear, although it seems to run in families. ADHD is not caused by eating too much sugar or by food additives, allergies, or immunizations. Medicines, counseling, and extra support at home and at school can help your child succeed. Your child's doctor will want to see your child regularly. Follow-up care is a key part of your child's treatment and safety. Be sure to make and go to all appointments, and call your doctor if your child is having problems. It's also a good idea to know your child's test results and keep a list of the medicines your child takes. How can you care for your child at home? Information    · Learn about ADHD. This will help you and your family better understand how to help your child.     · Ask your child's doctor or teacher about parenting classes and books.     · Look for a support group for parents of children with ADHD. Medicines    · Have your child take medicines exactly as prescribed. Call your doctor if you think your child is having a problem with his or her medicine. You will get more details on the specific medicines your doctor prescribes.     · If your child misses a dose, do not give your child extra doses to catch up.     · Keep close track of your child's medicines. Some medicines for ADHD can be abused by others. At home    · Praise and reward your child for positive behavior. This should directly follow your child's positive behavior.     · Give your child lots of attention and affection.  Spend time with your child doing activities Results for Ruy Gottlieb (MRN [id number]) as of 1/17/2021 22:55   Ref.  Range 1/17/2021 22:50   Hemoglobin, Art, Extended Latest Ref Range: 14.0 - 18.0 g/dL 14.3   pH, Arterial Latest Ref Range: 7.350 - 7.450  7.260 (LL)   pCO2, Arterial Latest Ref Range: 35.0 - 45.0 mmHg 79.0 (HH)   pO2, Arterial Latest Ref Range: 80.0 - 100.0 mmHg 43.0 (LL)   HCO3, Arterial Latest Ref Range: 22.0 - 26.0 mmol/L 35.5 (H)   Base Excess, Arterial Latest Ref Range: -2.0 - 2.0 mmol/L 5.4 (H)   O2 Sat, Arterial Latest Ref Range: >92 % 74.6 (LL)   O2 Content, Arterial Latest Ref Range: Not Established mL/dL 15.0   Methemoglobin, Arterial Latest Ref Range: <1.5 % 0.5   Carboxyhgb, Arterial Latest Ref Range: 0.0 - 5.0 % 2.1       PT ON ROOM AIR RR you both enjoy.     · Step back and let your child learn cause and effect when possible. For example, let your child go without a coat when he or she resists taking one. Your child will learn that going out in cold weather without a coat is a poor decision.     · Use time-outs or the loss of a privilege to discipline your child.     · Try to keep a regular schedule for meals, naps, and bedtime. Some children with ADHD have a hard time with change.     · Give instructions clearly. Break tasks into simple steps. Give one instruction at a time.     · Try to be patient and calm around your child. Your child may act without thinking, so try not to get angry.     · Tell your child exactly what you expect from him or her ahead of time. For example, when you plan to go grocery shopping, tell your child that he or she must stay at your side.     · Do not put your child into situations that may be overwhelming. For example, do not take your child to events that require quiet sitting for several hours.     · Find a counselor you and your child like and can relate to. Counseling can help children learn ways to deal with problems. Children can also talk about their feelings and deal with stress.     · Look for activitiesart projects, sports, music or dance lessonsthat your child likes and can do well. This can help boost your child's self-esteem. At school    · Ask your child's teacher if your child needs extra help at school.     · Help your child organize his or her school work. Show him or her how to use checklists and reminders to keep on track.     · Work with teachers and other school personnel. Good communication can help your child do better in school. When should you call for help? Watch closely for changes in your child's health, and be sure to contact your doctor if:    · Your child is having problems with behavior at school or with school work.     · Your child has problems making or keeping friends.    Where

## 2021-11-08 ENCOUNTER — HOSPITAL ENCOUNTER (OUTPATIENT)
Dept: GENERAL RADIOLOGY | Age: 81
Discharge: HOME | End: 2021-11-08
Payer: MEDICARE

## 2021-11-08 ENCOUNTER — OFFICE VISIT (OUTPATIENT)
Dept: GASTROENTEROLOGY | Age: 81
End: 2021-11-08
Payer: MEDICARE

## 2021-11-08 VITALS
HEIGHT: 72 IN | DIASTOLIC BLOOD PRESSURE: 70 MMHG | SYSTOLIC BLOOD PRESSURE: 150 MMHG | OXYGEN SATURATION: 93 % | WEIGHT: 238 LBS | BODY MASS INDEX: 32.23 KG/M2 | HEART RATE: 64 BPM

## 2021-11-08 DIAGNOSIS — K59.00 CONSTIPATION, UNSPECIFIED CONSTIPATION TYPE: ICD-10-CM

## 2021-11-08 DIAGNOSIS — R14.2 BELCHING: ICD-10-CM

## 2021-11-08 DIAGNOSIS — R19.8 UNCOMFORTABLE FULLNESS AFTER MEALS: ICD-10-CM

## 2021-11-08 DIAGNOSIS — K21.9 GASTROESOPHAGEAL REFLUX DISEASE, UNSPECIFIED WHETHER ESOPHAGITIS PRESENT: Primary | ICD-10-CM

## 2021-11-08 PROCEDURE — 99214 OFFICE O/P EST MOD 30 MIN: CPT | Performed by: NURSE PRACTITIONER

## 2021-11-08 PROCEDURE — G8427 DOCREV CUR MEDS BY ELIG CLIN: HCPCS | Performed by: NURSE PRACTITIONER

## 2021-11-08 PROCEDURE — 1036F TOBACCO NON-USER: CPT | Performed by: NURSE PRACTITIONER

## 2021-11-08 PROCEDURE — 74018 RADEX ABDOMEN 1 VIEW: CPT

## 2021-11-08 PROCEDURE — G8417 CALC BMI ABV UP PARAM F/U: HCPCS | Performed by: NURSE PRACTITIONER

## 2021-11-08 PROCEDURE — 1123F ACP DISCUSS/DSCN MKR DOCD: CPT | Performed by: NURSE PRACTITIONER

## 2021-11-08 PROCEDURE — G8482 FLU IMMUNIZE ORDER/ADMIN: HCPCS | Performed by: NURSE PRACTITIONER

## 2021-11-08 PROCEDURE — 4040F PNEUMOC VAC/ADMIN/RCVD: CPT | Performed by: NURSE PRACTITIONER

## 2021-11-08 RX ORDER — PANTOPRAZOLE SODIUM 20 MG/1
40 TABLET, DELAYED RELEASE ORAL DAILY
COMMUNITY

## 2021-11-08 ASSESSMENT — ENCOUNTER SYMPTOMS
RECTAL PAIN: 0
TROUBLE SWALLOWING: 0
DIARRHEA: 0
COUGH: 0
VOMITING: 0
ABDOMINAL DISTENTION: 0
ABDOMINAL PAIN: 0
CHOKING: 0
BLOOD IN STOOL: 0
NAUSEA: 0
ANAL BLEEDING: 0
CONSTIPATION: 1
SHORTNESS OF BREATH: 0

## 2021-11-08 NOTE — PROGRESS NOTES
to drive them home - they agree to the above and are willing to continue. Orders  Orders Placed This Encounter   Procedures    XR ABDOMEN (KUB) (SINGLE AP VIEW)     Standing Status:   Future     Number of Occurrences:   1     Standing Expiration Date:   11/8/2022     Order Specific Question:   Reason for exam:     Answer:   evaluate for stool burden, r/o obstruction     Medications  No orders of the defined types were placed in this encounter. Patient History:     Past Medical History:   Diagnosis Date    Acute liver failure without hepatic coma 10/23/2018    Back pain     \"with tired legs as a result\"    Bladder cancer ([address] 12/19/2018    Blood circulation, collateral     Carotid arterial disease (HCC)     recent surgery    CKD (chronic kidney disease), 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/2018    COPD with acute lower respiratory infection ([address] 02/24/2021    Epigastric discomfort     GERD (gastroesophageal reflux disease)     Hyperlipidemia     Hypertension     Hypertension     Palliative care patient 10/23/2018    Pneumonia due to infectious organism 11/06/2018    Primary osteoarthritis of left knee 10/14/2018    PVD (peripheral vascular disease) (HCC)     Tremor     Tremor on Right side x 1-2 weeks per stepdaughter    Type 2 diabetes mellitus with complication, without long-term current use of insulin ([address] 01/21/2021       Past Surgical History:   Procedure Laterality Date    BACK SURGERY      CARDIAC CATHETERIZATION  03/23/2021    100% RCA    COLONOSCOPY  2007?     CYSTOSCOPY Bilateral 12/19/2018    CYSTOSCOPY, BIOSPY FULGURATION OF BLADDER TUMOR POSSIBLE TURBT, RETROGRADE PYELOGRAM performed by [facility]alea [facility]egas MD at [facility] COLONOSCOPY FLX DX W/COLLJ SPEC WHEN PFRMD N/A 09/11/2017    Dr Philip Andrew internal hemorrhoids, diverticular disease-HP-No recall (age)   [place] REVISE MEDIAN N/CARPAL TUNNEL SURG Left 07/18/2018    OPEN CARPAL TUNNEL RELEASE performed by Yaz Miranda MD at [facility] OR    MD THROMBOENDARTECTMY NECK,NECK INCIS Left 08/28/2018    LEFT CAROTID ENDARTERECTOMY WITH VEIN PATCH ANGIOPLASTY AND COMPLETION ANGIOGRAM performed by Phoebe Torres MD at [address] 10/15/2018    LEFT COMPLEX TOTAL KNEE ARTHROPLASTY performed by Yevgeniy Scott MD at [facility] VASCULAR SURGERY  04/21/2015    Angy BESS Ultrasound guided access of left common femoral artery. Aortogram.Diagnostic right lower extremity arteriogram.Radiologic supervision and interpretation.  VASCULAR SURGERY  01/13/2015    Angy Levy M.D Atherectomy,angioplasty,and stenting of left superficial femoral artery.  VASCULAR SURGERY  03/11/2014    Angy Levy M.D. Ultrasound-guided access of right common femoral artery. Aortogram.Left lower extremity arteriogram.Atherectomy and angioplasty of left superficial femoral artery. Radiologic supervision and interpretation.  VASCULAR SURGERY  01/18/2013    Angy BESS Aortogram.Multistation arteriogram right lower extremity. Laser atherectomy and angioplasty of right superficial femoral artery. Selective catheterization of right tibioperoneal trunk. Angioplasty of peroneal artery and tibioperoneal trunk.  VASCULAR SURGERY  10/30/2018    SJS. Ultrasound guided cannulation of right internal vein. Placement of right internal jugular vein tunneled dialysis catheter bard equistream xk 23cm tip to cuff    VASCULAR SURGERY  12/17/2018    SJS. Removal of tunneled dilaysis catheter right internal jugular vein.        Family History   Problem Relation Age of Onset    Colon Cancer Father     Diabetes Brother     Colon Polyps Neg Hx     Liver Cancer Neg Hx     Liver Disease Neg Hx     Esophageal Cancer Neg Hx     Rectal Cancer Neg Hx     Stomach Cancer Neg Hx        Social History     Socioeconomic History    Marital status:      Spouse name: None    Number of children: None    Years of education: None    Highest education level: None   Occupational History    None   Tobacco Use    Smoking status: Former Smoker     Quit date: 6/3/2003     Years since quittin.4    Smokeless tobacco: Never Used   Vaping Use    Vaping Use: Never used   Substance and Sexual Activity    Alcohol use: Yes     Alcohol/week: 12.0 standard drinks     Types: 12 Glasses of wine per week     Comment: 2 glasses of wine every night    Drug use: No    Sexual activity: Yes     Partners: Female   Other Topics Concern    None   Social History Narrative    None     Social Determinants of Health     Financial Resource Strain:     Difficulty of Paying Living Expenses: Not on file   Food Insecurity:     Worried About Running Out of Food in the Last Year: Not on file    Ankit of Food in the Last Year: Not on file   Transportation Needs:     Lack of Transportation (Medical): Not on file    Lack of Transportation (Non-Medical):  Not on file   Physical Activity:     Days of Exercise per Week: Not on file    Minutes of Exercise per Session: Not on file   Stress:     Feeling of Stress : Not on file   Social Connections:     Frequency of Communication with Friends and Family: Not on file    Frequency of Social Gatherings with Friends and Family: Not on file    Attends Gnosticism Services: Not on file    Active Member of [address] [place] [address] or Organizations: Not on file    Attends Club or Organization Meetings: Not on file    Marital Status: Not on file   Intimate Partner Violence:     Fear of Current or Ex-Partner: Not on file    Emotionally Abused: Not on file    Physically Abused: Not on file    Sexually Abused: Not on file   Housing Stability:     Unable to Pay for Housing in the Last Year: Not on file    Number of [place] in the Last Year: Not on file    Unstable Housing in the Last Year: Not on file       Current Outpatient Medications   Medication Sig Dispense Refill    pantoprazole (PROTONIX) 20 MG tablet Take 20 mg by mouth daily      Nutritional Supplements (VITAMIN D BOOSTER PO) Take by mouth      SENNA PLUS 8.6-50 MG per tablet       glipiZIDE (GLUCOTROL XL) 10 MG extended release tablet TAKE 1 TABLET BY MOUTH EVERY DAY      dexlansoprazole (DEXILANT) 60 MG CPDR delayed release capsule Take 60 mg by mouth daily       bumetanide (BUMEX) 1 MG tablet Take 1 mg by mouth daily      amiodarone (CORDARONE) 200 MG tablet TAKE 1 TABLET BY MOUTH DAILY 30 tablet 3    mirabegron (MYRBETRIQ) 25 MG TB24 Take 1 tablet by mouth daily 90 tablet 3    trospium (SANCTURA) 20 MG tablet Take 1 tablet by mouth daily 90 tablet 3    aspirin 81 MG EC tablet Take 1 tablet by mouth daily 30 tablet 3    atorvastatin (LIPITOR) 20 MG tablet Take 1 tablet by mouth daily 30 tablet 0    metoprolol succinate (TOPROL XL) 100 MG extended release tablet Take 1 tablet by mouth daily 30 tablet 3    NIFEdipine (ADALAT CC) 60 MG extended release tablet Take 1 tablet by mouth daily 30 tablet 3    ferrous sulfate (IRON 325) 325 (65 Fe) MG tablet Take 1 tablet by mouth 2 times daily (with meals) 30 tablet 3    folic acid (FOLVITE) 1 MG tablet Take 1 tablet by mouth daily 30 tablet 3    tamsulosin (FLOMAX) 0.4 MG capsule Take 1 capsule by mouth 2 times daily 60 capsule 3    vitamin D (ERGOCALCIFEROL) 1.25 MG (69186 UT) CAPS capsule Take 1 capsule by mouth once a week 5 capsule 0    OXYGEN Inhale 2 L into the lungs continuous 1 Container 5     No current facility-administered medications for this visit. Allergies   Allergen Reactions    Eliquis [Apixaban] Other (See Comments)     \"almost bled to death\"    Promethazine Hcl Other (See Comments)     He became encephalopathic for several hours and was not responsive. Review of Systems   Constitutional: Negative for activity change, appetite change, fatigue, fever and unexpected weight change. HENT: Negative for trouble swallowing.     Respiratory: Negative for cough, choking and shortness of breath. Cardiovascular: Negative for chest pain. Gastrointestinal: Positive for constipation. Negative for abdominal distention, abdominal pain, anal bleeding, blood in stool, diarrhea, nausea, rectal pain and vomiting. Reflux, belching   Allergic/Immunologic: Negative for food allergies. All other systems reviewed and are negative. Objective:     BP (!) 150/70 (Site: Right Upper Arm)   Pulse 64   Ht 6' (1.829 m)   Wt 238 lb (108 kg)   SpO2 93%   BMI 32.28 kg/m²     Physical Exam  Vitals reviewed. Constitutional:       General: He is not in acute distress. Appearance: He is well-developed. HENT:      Head: Normocephalic and atraumatic. Right Ear: External ear normal.      Left Ear: External ear normal.      Nose: Nose normal.      Comments: Mask on     Mouth/Throat:      Comments: Mask on  Eyes:      Conjunctiva/sclera: Conjunctivae normal.      Pupils: Pupils are equal, round, and reactive to light. Cardiovascular:      Rate and Rhythm: Normal rate and regular rhythm. Heart sounds: Normal heart sounds. No murmur heard. No friction rub. No gallop. Pulmonary:      Effort: Pulmonary effort is normal. No respiratory distress. Breath sounds: Normal breath sounds. Abdominal:      General: Bowel sounds are normal. There is no distension. Palpations: Abdomen is soft. There is no mass. Tenderness: There is no abdominal tenderness. There is no guarding or rebound. Musculoskeletal:         General: Normal range of motion. Cervical back: Normal range of motion and neck supple. Skin:     General: Skin is warm and dry. Findings: No rash. Nails: There is no clubbing. Neurological:      Mental Status: He is alert and oriented to person, place, and time. Gait: Gait normal.   Psychiatric:         Behavior: Behavior normal.         Thought Content:  Thought content normal.

## 2021-11-09 ENCOUNTER — TELEPHONE (OUTPATIENT)
Dept: CARDIOLOGY CLINIC | Age: 81
End: 2021-11-09

## 2021-11-09 NOTE — TELEPHONE ENCOUNTER
Date: 11-18-21    Cardiologist: Dr. Lili Ritter    Procedure: Endoscopy    Surgeon: Dr. Sammi Terrazas    Last Office Visit: 10-21-21    Reason for office visit and medical concerns addressed at this office visit: A-Fib, HTN, CHF, SOB, CKD, PVD, DM, hyperlipidemia    Testing Performed and Date of Service:  EKG 8-25-21  Heart cath 3-6-21    RCRI = 2 pts, elevated, 6.6%   METs 4    Current Medications: protonix, vit D, glipizide, dexilant, bumex, amiodarone, myrbetriq, sanctura, ASA, lipitor, toprol xl, adalat, Fe, folic acid, flomax, vit D     Is the patient currently taking an anticoagulant? If so, what is the diagnosis the patient has been given to warrant the need for the anticoagulant?  ASA    Additional Notes: cardiac risk request

## 2021-11-15 NOTE — PROGRESS NOTES
Lyle Lugo is a [de-identified] y.o. male who presents today   Chief Complaint   Patient presents with    Follow-up     I am here for a 3 mo fu for BPH       Benign Prostatic Hypertrophy  Patient complains of lower urinary tract symptoms. He reports nocturia one time a night. He denies frequency, incomplete emptying, intermittency, straining, urgency and weak stream. Patient states symptoms are of mild severity. Onset of symptoms was several months ago and was gradual in onset. His AUA Symptom Score is, 1/35 manifested as irritative symptoms including nocturia. He has no personal history and no family history of prostate cancer. He reports a history of no complicating symptoms. He denies flank pain, gross hematuria, kidney stones and recurrent UTI. Is currently maintained on tamsulosin 0.8 mg daily. He was started on Myrbetriq 25 mg daily and trospium was added at last visit. He states significant improvement of symptoms now with a score of 1/35. Previous PSA on 8/8/2021 was 0.6. He does have a history of bladder cancer and is followed with Dr. Nishi More for serial surveillance cystoscopies. He is to follow-up next month with cystoscopy.     Past Medical History:   Diagnosis Date    Acute liver failure without hepatic coma 10/23/2018    Back pain     \"with tired legs as a result\"    Bladder cancer ([facility] 12/19/2018    Blood circulation, collateral     Carotid arterial disease (HCC)     recent surgery    CKD (chronic kidney disease), 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/2018    COPD with acute lower respiratory infection ([facility] 02/24/2021    Epigastric discomfort     GERD (gastroesophageal reflux disease)     Hyperlipidemia     Hypertension     Hypertension     Palliative care patient 10/23/2018    Pneumonia due to infectious organism 11/06/2018    Primary osteoarthritis of left knee 10/14/2018    PVD [facility]peripheral vascular disease) [facility])     Tremor     Tremor on Right side x 1-2 weeks per stepdaughter    Type 2 diabetes mellitus with complication, without long-term current use of insulin ([facility] 01/21/2021       Past Surgical History:   Procedure Laterality Date    BACK SURGERY      CARDIAC CATHETERIZATION  03/23/2021    100% RCA    COLONOSCOPY  2007?  CYSTOSCOPY Bilateral 12/19/2018    CYSTOSCOPY, BIOSPY FULGURATION OF BLADDER TUMOR POSSIBLE TURBT, RETROGRADE PYELOGRAM performed by Alessandra Donald MD at [facility] 44 COLONOSCOPY FLX DX W/COLLJ SPEC WHEN PFRMD N/A 09/11/2017    Dr Khan [place] internal hemorrhoids, diverticular disease-HP-No recall (age)   [facility] [place] REVISE MEDIAN N/CARPAL TUNNEL SURG Left 07/18/2018    OPEN CARPAL TUNNEL RELEASE performed by Yevgeniy Scott MD at [address] Left 08/28/2018    LEFT CAROTID ENDARTERECTOMY WITH VEIN PATCH ANGIOPLASTY AND COMPLETION ANGIOGRAM performed by Phoebe Torres MD at [address] 10/15/2018    LEFT COMPLEX TOTAL KNEE ARTHROPLASTY performed by Yevgeniy Scott MD at [facility] VASCULAR SURGERY  04/21/2015    Angy BESS Ultrasound guided access of left common femoral artery. Aortogram.Diagnostic right lower extremity arteriogram.Radiologic supervision and interpretation.  VASCULAR SURGERY  01/13/2015    Angy Levy M.D Atherectomy,angioplasty,and stenting of left superficial femoral artery.  VASCULAR SURGERY  03/11/2014    Angy Levy M.D. Ultrasound-guided access of right common femoral artery. Aortogram.Left lower extremity arteriogram.Atherectomy and angioplasty of left superficial femoral artery. Radiologic supervision and interpretation.  VASCULAR SURGERY  01/18/2013    Angy BESS Aortogram.Multistation arteriogram right lower extremity. Laser atherectomy and angioplasty of right superficial femoral artery. Selective catheterization of right tibioperoneal trunk. Angioplasty of peroneal artery and tibioperoneal trunk.     VASCULAR SURGERY 10/30/2018    [facility]. Ultrasound guided cannulation of right internal vein. Placement of right internal jugular vein tunneled dialysis catheter bard tian fry 23cm tip to cuff    VASCULAR SURGERY  12/17/2018    SJS. Removal of tunneled dilaysis catheter right internal jugular vein. Current Outpatient Medications   Medication Sig Dispense Refill    pantoprazole (PROTONIX) 20 MG tablet Take 20 mg by mouth daily      Nutritional Supplements (VITAMIN D BOOSTER PO) Take by mouth      SENNA PLUS 8.6-50 MG per tablet       glipiZIDE (GLUCOTROL XL) 10 MG extended release tablet TAKE 1 TABLET BY MOUTH EVERY DAY      dexlansoprazole (DEXILANT) 60 MG CPDR delayed release capsule Take 60 mg by mouth daily       bumetanide (BUMEX) 1 MG tablet Take 1 mg by mouth daily      amiodarone (CORDARONE) 200 MG tablet TAKE 1 TABLET BY MOUTH DAILY 30 tablet 3    mirabegron (MYRBETRIQ) 25 MG TB24 Take 1 tablet by mouth daily 90 tablet 3    trospium (SANCTURA) 20 MG tablet Take 1 tablet by mouth daily 90 tablet 3    aspirin 81 MG EC tablet Take 1 tablet by mouth daily 30 tablet 3    atorvastatin (LIPITOR) 20 MG tablet Take 1 tablet by mouth daily 30 tablet 0    metoprolol succinate (TOPROL XL) 100 MG extended release tablet Take 1 tablet by mouth daily 30 tablet 3    NIFEdipine (ADALAT CC) 60 MG extended release tablet Take 1 tablet by mouth daily 30 tablet 3    ferrous sulfate (IRON 325) 325 (65 Fe) MG tablet Take 1 tablet by mouth 2 times daily (with meals) 30 tablet 3    folic acid (FOLVITE) 1 MG tablet Take 1 tablet by mouth daily 30 tablet 3    tamsulosin (FLOMAX) 0.4 MG capsule Take 1 capsule by mouth 2 times daily 60 capsule 3    vitamin D (ERGOCALCIFEROL) 1.25 MG (52719 UT) CAPS capsule Take 1 capsule by mouth once a week 5 capsule 0    OXYGEN Inhale 2 L into the lungs continuous 1 Container 5     No current facility-administered medications for this visit.        Allergies   Allergen Reactions    Eliquis [Apixaban] Other (See Comments)     \"almost bled to death\"    Promethazine Hcl Other (See Comments)     He became encephalopathic for several hours and was not responsive. Social History     Socioeconomic History    Marital status:      Spouse name: None    Number of children: None    Years of education: None    Highest education level: None   Occupational History    None   Tobacco Use    Smoking status: Former Smoker     Quit date: 6/3/2003     Years since quittin.4    Smokeless tobacco: Never Used   Vaping Use    Vaping Use: Never used   Substance and Sexual Activity    Alcohol use: Yes     Alcohol/week: 12.0 standard drinks     Types: 12 Glasses of wine per week     Comment: 2 glasses of wine every night    Drug use: No    Sexual activity: Yes     Partners: Female   Other Topics Concern    None   Social History Narrative    None     Social Determinants of Health     Financial Resource Strain:     Difficulty of Paying Living Expenses: Not on file   Food Insecurity:     Worried About Running Out of Food in the Last Year: Not on file    Ankit of Food in the Last Year: Not on file   Transportation Needs:     Lack of Transportation (Medical): Not on file    Lack of Transportation (Non-Medical):  Not on file   Physical Activity:     Days of Exercise per Week: Not on file    Minutes of Exercise per Session: Not on file   Stress:     Feeling of Stress : Not on file   Social Connections:     Frequency of Communication with Friends and Family: Not on file    Frequency of Social Gatherings with Friends and Family: Not on file    Attends Temple Services: Not on file    Active Member of Clubs or Organizations: Not on file    Attends Club or Organization Meetings: Not on file    Marital Status: Not on file   Intimate Partner Violence:     Fear of Current or Ex-Partner: Not on file    Emotionally Abused: Not on file    Physically Abused: Not on file    Sexually Abused: Not on file   Housing Stability:     Unable to Pay for Housing in the Last Year: Not on file    Number of Places Lived in the Last Year: Not on file    Unstable Housing in the Last Year: Not on file       Family History   Problem Relation Age of Onset    Colon Cancer Father     Diabetes Brother     Colon Polyps Neg Hx     Liver Cancer Neg Hx     Liver Disease Neg Hx     Esophageal Cancer Neg Hx     Rectal Cancer Neg Hx     Stomach Cancer Neg Hx        REVIEW OF SYSTEMS:  Review of Systems   Constitutional: Negative for chills and fever. Gastrointestinal: Negative for abdominal distention, abdominal pain, nausea and vomiting. Genitourinary: Negative for difficulty urinating, dysuria, flank pain, frequency, hematuria and urgency. Musculoskeletal: Positive for arthralgias and gait problem. Negative for back pain. Neurological: Positive for weakness. Psychiatric/Behavioral: Negative for agitation and confusion. PHYSICAL EXAM:  Temp 97.9 °F (36.6 °C) (Temporal)   Ht 6' (1.829 m)   Wt 239 lb 6.4 oz (108.6 kg)   BMI 32.47 kg/m²   Physical Exam  Vitals and nursing note reviewed. Constitutional:       General: He is not in acute distress. Appearance: Normal appearance. He is not ill-appearing. Pulmonary:      Effort: Pulmonary effort is normal. No respiratory distress. Abdominal:      General: There is no distension. Tenderness: There is no abdominal tenderness. There is no right CVA tenderness or left CVA tenderness. Neurological:      Mental Status: He is alert and oriented to person, place, and time. Mental status is at baseline. Motor: Weakness present.       Gait: Gait abnormal.   Psychiatric:         Mood and Affect: Mood normal.         Behavior: Behavior normal.       DATA:    Results for orders placed or performed in visit on 11/16/21   POCT Urinalysis no Micro   Result Value Ref Range    Color, UA YELLOW     Clarity, UA CLEAR     Glucose, UA POC NEG     Bilirubin, UA NEG     Ketones, UA NEG     Spec Grav, UA 1.015     Blood, UA POC NEG     pH, UA 7     Protein, UA POC TRACE     Urobilinogen, UA 1     Leukocytes, UA NEG     Nitrite, UA NEG     Appearance, Fluid Clear Clear, Slightly Cloudy       1. Benign prostatic hyperplasia with urinary frequency  Patient doing significantly well with tamsulosin and combination medical therapy with Myrbetriq and trospium. Bladder scan revealed 0 mL PVR today he is emptying his bladder well. AUA score is 1/35. Patient is concerned of the cost of Myrbetriq. Discussed taking this medication every other day and should have the same effect due to a long half-life. We will go ahead and give him Myrbetriq 25 mg samples today. We will continue these medications and have patient follow-up with Dr. Mono Love. 2. History of bladder cancer  History of bladder cancer. We will follow-up next month with cystoscopy with Dr. Mono Love. Orders Placed This Encounter   Procedures    POCT Urinalysis no Micro    AK Measure, post-void residual, US, non-imaging        Return for Keep scheduled f/u with Mono Love for cysto. will give myrbetriq 25mg samples 4wks. All information inputted into the note by the MA to include chief complaint, past medical history, past surgical history, medications, allergies, social and family history and review of systems has been reviewed and updated as needed by me. EMR Dragon/transcription disclaimer: Much of this documentt is electronic  transcription/translation of spoken language to printed text. The  electronic translation of spoken language may be erroneous, or at times,  nonsensical words or phrases may be inadvertently transcribed.  Although I  have reviewed the document for such errors, some may still exist.

## 2021-11-16 ENCOUNTER — OFFICE VISIT (OUTPATIENT)
Dept: UROLOGY | Age: 81
End: 2021-11-16
Payer: MEDICARE

## 2021-11-16 VITALS — TEMPERATURE: 97.9 F | WEIGHT: 239.4 LBS | HEIGHT: 72 IN | BODY MASS INDEX: 32.43 KG/M2

## 2021-11-16 DIAGNOSIS — N40.1 BENIGN PROSTATIC HYPERPLASIA WITH URINARY FREQUENCY: ICD-10-CM

## 2021-11-16 DIAGNOSIS — Z85.51 HISTORY OF BLADDER CANCER: ICD-10-CM

## 2021-11-16 DIAGNOSIS — R35.0 BENIGN PROSTATIC HYPERPLASIA WITH URINARY FREQUENCY: ICD-10-CM

## 2021-11-16 LAB
APPEARANCE FLUID: CLEAR
BILIRUBIN, POC: NORMAL
BLOOD URINE, POC: NORMAL
CLARITY, POC: CLEAR
COLOR, POC: YELLOW
GLUCOSE URINE, POC: NORMAL
KETONES, POC: NORMAL
LEUKOCYTE EST, POC: NORMAL
NITRITE, POC: NORMAL
PH, POC: 7
PROTEIN, POC: NORMAL
SPECIFIC GRAVITY, POC: 1.01
UROBILINOGEN, POC: 1

## 2021-11-16 PROCEDURE — 51798 US URINE CAPACITY MEASURE: CPT | Performed by: NURSE PRACTITIONER

## 2021-11-16 PROCEDURE — 99213 OFFICE O/P EST LOW 20 MIN: CPT | Performed by: NURSE PRACTITIONER

## 2021-11-16 PROCEDURE — 81002 URINALYSIS NONAUTO W/O SCOPE: CPT | Performed by: NURSE PRACTITIONER

## 2021-11-16 ASSESSMENT — ENCOUNTER SYMPTOMS
BACK PAIN: 0
ABDOMINAL PAIN: 0
VOMITING: 0
NAUSEA: 0
ABDOMINAL DISTENTION: 0

## 2021-11-18 ENCOUNTER — TELEPHONE (OUTPATIENT)
Dept: GASTROENTEROLOGY | Age: 81
End: 2021-11-18

## 2021-11-18 ENCOUNTER — ANESTHESIA (OUTPATIENT)
Dept: ENDOSCOPY | Age: 81
End: 2021-11-18
Payer: MEDICARE

## 2021-11-18 ENCOUNTER — HOSPITAL ENCOUNTER (OUTPATIENT)
Age: 81
Setting detail: OUTPATIENT SURGERY
Discharge: HOME | End: 2021-11-18
Attending: INTERNAL MEDICINE | Admitting: INTERNAL MEDICINE
Payer: MEDICARE

## 2021-11-18 ENCOUNTER — ANESTHESIA EVENT (OUTPATIENT)
Dept: ENDOSCOPY | Age: 81
End: 2021-11-18
Payer: MEDICARE

## 2021-11-18 VITALS
WEIGHT: 229 LBS | OXYGEN SATURATION: 93 % | HEIGHT: 72 IN | DIASTOLIC BLOOD PRESSURE: 67 MMHG | SYSTOLIC BLOOD PRESSURE: 119 MMHG | RESPIRATION RATE: 16 BRPM | BODY MASS INDEX: 31.02 KG/M2 | TEMPERATURE: 97.8 F | HEART RATE: 60 BPM

## 2021-11-18 VITALS — SYSTOLIC BLOOD PRESSURE: 126 MMHG | OXYGEN SATURATION: 82 % | DIASTOLIC BLOOD PRESSURE: 59 MMHG

## 2021-11-18 LAB
GLUCOSE BLD-MCNC: 87 MG/DL (ref 70–99)
PERFORMED ON: NORMAL

## 2021-11-18 PROCEDURE — 2709999900 HC NON-CHARGEABLE SUPPLY: Performed by: INTERNAL MEDICINE

## 2021-11-18 PROCEDURE — 6360000002 HC RX W HCPCS: Performed by: NURSE ANESTHETIST, CERTIFIED REGISTERED

## 2021-11-18 PROCEDURE — 2580000003 HC RX 258: Performed by: NURSE ANESTHETIST, CERTIFIED REGISTERED

## 2021-11-18 PROCEDURE — 7100000011 HC PHASE II RECOVERY - ADDTL 15 MIN: Performed by: INTERNAL MEDICINE

## 2021-11-18 PROCEDURE — 3700000001 HC ADD 15 MINUTES (ANESTHESIA): Performed by: INTERNAL MEDICINE

## 2021-11-18 PROCEDURE — 3609012400 HC EGD TRANSORAL BIOPSY SINGLE/MULTIPLE: Performed by: INTERNAL MEDICINE

## 2021-11-18 PROCEDURE — 3700000000 HC ANESTHESIA ATTENDED CARE: Performed by: INTERNAL MEDICINE

## 2021-11-18 PROCEDURE — 43235 EGD DIAGNOSTIC BRUSH WASH: CPT | Performed by: INTERNAL MEDICINE

## 2021-11-18 PROCEDURE — 2580000003 HC RX 258: Performed by: INTERNAL MEDICINE

## 2021-11-18 PROCEDURE — 7100000010 HC PHASE II RECOVERY - FIRST 15 MIN: Performed by: INTERNAL MEDICINE

## 2021-11-18 PROCEDURE — 2500000003 HC RX 250 WO HCPCS: Performed by: NURSE ANESTHETIST, CERTIFIED REGISTERED

## 2021-11-18 PROCEDURE — 82947 ASSAY GLUCOSE BLOOD QUANT: CPT

## 2021-11-18 RX ORDER — ONDANSETRON 2 MG/ML
4 INJECTION INTRAMUSCULAR; INTRAVENOUS
Status: DISCONTINUED | OUTPATIENT
Start: 2021-11-18 | End: 2021-11-18 | Stop reason: HOSPADM

## 2021-11-18 RX ORDER — METOCLOPRAMIDE 5 MG/1
5 TABLET ORAL 3 TIMES DAILY
Qty: 42 TABLET | Refills: 0 | Status: SHIPPED | OUTPATIENT
Start: 2021-11-18 | End: 2021-12-16

## 2021-11-18 RX ORDER — SODIUM CHLORIDE, SODIUM LACTATE, POTASSIUM CHLORIDE, CALCIUM CHLORIDE 600; 310; 30; 20 MG/100ML; MG/100ML; MG/100ML; MG/100ML
INJECTION, SOLUTION INTRAVENOUS CONTINUOUS
Status: DISCONTINUED | OUTPATIENT
Start: 2021-11-18 | End: 2021-11-18 | Stop reason: HOSPADM

## 2021-11-18 RX ORDER — PROPOFOL 10 MG/ML
INJECTION, EMULSION INTRAVENOUS PRN
Status: DISCONTINUED | OUTPATIENT
Start: 2021-11-18 | End: 2021-11-18 | Stop reason: SDUPTHER

## 2021-11-18 RX ORDER — DIPHENHYDRAMINE HYDROCHLORIDE 50 MG/ML
12.5 INJECTION INTRAMUSCULAR; INTRAVENOUS
Status: DISCONTINUED | OUTPATIENT
Start: 2021-11-18 | End: 2021-11-18 | Stop reason: HOSPADM

## 2021-11-18 RX ORDER — LIDOCAINE HYDROCHLORIDE 10 MG/ML
INJECTION, SOLUTION EPIDURAL; INFILTRATION; INTRACAUDAL; PERINEURAL PRN
Status: DISCONTINUED | OUTPATIENT
Start: 2021-11-18 | End: 2021-11-18 | Stop reason: SDUPTHER

## 2021-11-18 RX ORDER — SODIUM CHLORIDE, SODIUM LACTATE, POTASSIUM CHLORIDE, CALCIUM CHLORIDE 600; 310; 30; 20 MG/100ML; MG/100ML; MG/100ML; MG/100ML
INJECTION, SOLUTION INTRAVENOUS CONTINUOUS PRN
Status: DISCONTINUED | OUTPATIENT
Start: 2021-11-18 | End: 2021-11-18 | Stop reason: SDUPTHER

## 2021-11-18 RX ADMIN — SODIUM CHLORIDE, SODIUM LACTATE, POTASSIUM CHLORIDE, AND CALCIUM CHLORIDE: 600; 310; 30; 20 INJECTION, SOLUTION INTRAVENOUS at 11:37

## 2021-11-18 RX ADMIN — SODIUM CHLORIDE, POTASSIUM CHLORIDE, SODIUM LACTATE AND CALCIUM CHLORIDE: 600; 310; 30; 20 INJECTION, SOLUTION INTRAVENOUS at 11:25

## 2021-11-18 RX ADMIN — LIDOCAINE HYDROCHLORIDE 50 MG: 10 INJECTION, SOLUTION EPIDURAL; INFILTRATION; INTRACAUDAL; PERINEURAL at 11:43

## 2021-11-18 RX ADMIN — PROPOFOL 175 MG: 10 INJECTION, EMULSION INTRAVENOUS at 11:43

## 2021-11-18 ASSESSMENT — ENCOUNTER SYMPTOMS
DYSPNEA ACTIVITY LEVEL: NO INTERVAL CHANGE
SHORTNESS OF BREATH: 1

## 2021-11-18 ASSESSMENT — PAIN SCALES - GENERAL
PAINLEVEL_OUTOF10: 0
PAINLEVEL_OUTOF10: 0

## 2021-11-18 ASSESSMENT — COPD QUESTIONNAIRES: CAT_SEVERITY: NO INTERVAL CHANGE

## 2021-11-18 ASSESSMENT — LIFESTYLE VARIABLES: SMOKING_STATUS: 0

## 2021-11-18 NOTE — OP NOTE
Endoscopic Procedure Note    Patient: Simona Quintanilla: 1940  Med Rec#: [id number] Acc#: [id number]     Primary Care Provider Jil Cm MD    Endoscopist: Valorie Castro MD, MD    Date of Procedure:  11/18/2021    Procedure:   1. EGD up to the distal duodenum using a pediatric colonoscope    Indications:   1. Gastroesophageal reflux disease, unspecified whether esophagitis present    2. Constipation, unspecified constipation type    3. Uncomfortable fullness after meals    4. Belching      Anesthesia:  Sedation was administered by anesthesia who monitored the patient during the procedure. Estimated Blood Loss: minimal    Procedure:   After reviewing the patient's chart and obtaining informed consent, the patient was placed in the left lateral decubitus position. A forward-viewing Olympus endoscope was lubricated and inserted through the mouth into the oropharynx. Under direct visualization, the upper esophagus was intubated. The EGD scope was advanced to the level antrum of the stomach only since further advancement was not possible due to large amount of food in the antrum and pylorus obscuring the anatomy. Next using a pediatric colonoscope, endoscopy was performed with carefully advancing the endoscope through the pyloric channel into the third portion of duodenum. Scope was slowly withdrawn with careful inspection of the mucosal surfaces. The scope was retroflexed for inspection of the gastric fundus and incisura. Findings and maneuvers are listed in impression below. The patient tolerated the procedure well. The scope was removed. There were no immediate complications. Findings/IMPRESSION:  Esophagus: normal and a normal EG junction at 42 cm. There is no obvious hiatal hernia present.       Stomach:  abnormal: Suboptimal exam due to presence of large amounts of solid and semisolid food as well as medication pills within the patient's stomach occupying most of the mucosa in the body, antrum and pylorus, and portions of the fundus. The stomach lumen appeared to distended well with air insufflation and collapse upon suctioning but otherwise the exam was inadequate for diagnostic purposes today. Findings are suggestive of gastroparesis. Duodenum: normal bulb and second portion without any evidence of gastric outlet obstruction       RECOMMENDATIONS:    1. Clear liquid diet today and may advance to full liquids for the next 2 to 3 days. 2.  Reglan  5 mg p.o. 3 times daily x 2 weeks  3. If no further nausea or vomiting, patient may advance his diet. Soft puréed blended diet starting on Monday 11/22/21  4. Repeat EGD for a more thorough examination of the stomach in 10 to 14 days after the patient has stayed on a clear liquid diet on the day before his EGD procedure. 5.  He will likely require a nuclear medicine gastric emptying scan study in the near future. - Resume previous meds   - GI clinic f/u 3 to 4 weeks with MsRenetta Dany Chakraborty scheduled f/u appts with other MDs     The results were discussed with the patient and family. A copy of the images obtained were given to the patient.      Pravin Sandoval MD, MD  11/18/2021  11:42 AM

## 2021-11-18 NOTE — ANESTHESIA PRE PROCEDURE
Department of Anesthesiology  Preprocedure Note       Name:  Marissa Cheema   Age:  [de-identified] y.o.  :  1940                                          MRN:  [id number]         Date:  2021      Surgeon: Fuad Miller):  Tamika Sheppard MD    Procedure: EGD BIOPSY (N/A Abdomen)    Medications prior to admission:   Prior to Admission medications    Medication Sig Start Date End Date Taking?  Authorizing Provider   pantoprazole (PROTONIX) 20 MG tablet Take 20 mg by mouth daily    Historical Provider, MD   Nutritional Supplements (VITAMIN D BOOSTER PO) Take by mouth    Historical Provider, MD   SENNA PLUS 8.6-50 MG per tablet  10/26/21   Historical Provider, MD   glipiZIDE (GLUCOTROL XL) 10 MG extended release tablet TAKE 1 TABLET BY MOUTH EVERY DAY 10/19/21   Historical Provider, MD   dexlansoprazole (DEXILANT) 60 MG CPDR delayed release capsule Take 60 mg by mouth daily     Historical Provider, MD   bumetanide (BUMEX) 1 MG tablet Take 1 mg by mouth daily    Historical Provider, MD   amiodarone (CORDARONE) 200 MG tablet TAKE 1 TABLET BY MOUTH DAILY 21   Christie Garcia, APRN   mirabegron (MYRBETRIQ) 25 MG TB24 Take 1 tablet by mouth daily 21   Fang Friend, APRN - CNP   trospium (SANCTURA) 20 MG tablet Take 1 tablet by mouth daily 21   Fang [facility], APRN - CNP   aspirin 81 MG EC tablet Take 1 tablet by mouth daily 3/29/21   Kylie Awan MD   atorvastatin (LIPITOR) 20 MG tablet Take 1 tablet by mouth daily 3/29/21   Kylie Awan MD   metoprolol succinate (TOPROL XL) 100 MG extended release tablet Take 1 tablet by mouth daily 3/29/21   Kylie Awan MD   NIFEdipine (ADALAT CC) 60 MG extended release tablet Take 1 tablet by mouth daily 3/29/21   Kylie Awan MD   ferrous sulfate (IRON 325) 325 (65 Fe) MG tablet Take 1 tablet by mouth 2 times daily (with meals) 3/29/21   Kylie Awan MD   folic acid (FOLVITE) 1 MG tablet Take 1 tablet by mouth daily 3/29/21   Kylie Awan MD   tamsulosin [facility]) 0.4 MG capsule Take 1 capsule by mouth 2 times daily 3/29/21   Eddie Shin MD   vitamin D (ERGOCALCIFEROL) 1.25 MG (24808 UT) CAPS capsule Take 1 capsule by mouth once a week 3/29/21   Eddie Shin MD   OXYGEN Inhale 2 L into the lungs continuous 3/29/21   Eddie Shin MD       Current medications:    No current facility-administered medications for this visit. No current outpatient medications on file. Facility-Administered Medications Ordered in Other Visits   Medication Dose Route Frequency Provider Last Rate Last Admin    lactated ringers infusion   IntraVENous Continuous Lauriel MD Vishnu           Allergies: Allergies   Allergen Reactions    Eliquis [Apixaban] Other (See Comments)     \"almost bled to death\"    Promethazine Hcl Other (See Comments)     He became encephalopathic for several hours and was not responsive.        Problem List:    Patient Active Problem List   Diagnosis Code    Diverticulitis of large intestine with perforation without bleeding K57.20    Generalized abdominal pain R10.84    Colonic diverticular abscess K57.20    Bilateral carotid artery stenosis I65.23    Atherosclerosis of native artery of both lower extremities with intermittent claudication ([facility]) I70.213    Carotid stenosis, asymptomatic, left I65.22    Primary osteoarthritis of left knee M17.12    Arthritis of knee M17.10    Essential hypertension I10    Pure hypercholesterolemia E78.00    Slow transit constipation K59.01    Iron deficiency anemia D50.9    GERD (gastroesophageal reflux disease) K21.9    Acute liver failure without hepatic coma K72.00    CHF (congestive heart failure) ([facility]) I50.9    Bilateral pleural effusion J90    Palliative care patient Z51.5    Shock liver K72.00    Acute renal failure ([facility] [address] N17.9    BPH associated with nocturia N40.1, R35.1    Bleeding diathesis ([facility]) D69.9    Epistaxis R04.0    Acute blood loss anemia D62    Melena K92.1    osteoarthritis of left knee 10/14/2018    PVD (peripheral vascular disease) ([facility])     Tremor     Tremor on Right side x 1-2 weeks per stepdaughter    Type 2 diabetes mellitus with complication, without long-term current use of insulin ([facility] [address] 01/21/2021       Past Surgical History:        Procedure Laterality Date    BACK SURGERY      CARDIAC CATHETERIZATION  03/23/2021    100% RCA    COLONOSCOPY  2007?  CYSTOSCOPY Bilateral 12/19/2018    CYSTOSCOPY, BIOSPY FULGURATION OF BLADDER TUMOR POSSIBLE TURBT, RETROGRADE PYELOGRAM performed by Jackie Landers MD at [facility] COLONOSCOPY FLX DX W/COLLJ SPEC WHEN PFRMD N/A 09/11/2017    Dr Selvin Johnson internal hemorrhoids, diverticular disease-HP-No recall (age)   King MA REVISE MEDIAN N/CARPAL TUNNEL SURG Left 07/18/2018    OPEN CARPAL TUNNEL RELEASE performed by Marilyn Kent MD at [address] Left 08/28/2018    LEFT CAROTID ENDARTERECTOMY WITH VEIN PATCH ANGIOPLASTY AND COMPLETION ANGIOGRAM performed by Dalila Hearn MD at [address] 10/15/2018    LEFT COMPLEX TOTAL KNEE ARTHROPLASTY performed by Marilyn Kent MD at [facility] VASCULAR SURGERY  04/21/2015    Aditya BESS Ultrasound guided access of left common femoral artery. Aortogram.Diagnostic right lower extremity arteriogram.Radiologic supervision and interpretation.  VASCULAR SURGERY  01/13/2015    Aditya Angulo M.D Atherectomy,angioplasty,and stenting of left superficial femoral artery.  VASCULAR SURGERY  03/11/2014    Aditya Angulo M.D. Ultrasound-guided access of right common femoral artery. Aortogram.Left lower extremity arteriogram.Atherectomy and angioplasty of left superficial femoral artery. Radiologic supervision and interpretation.  VASCULAR SURGERY  01/18/2013    Aditya BESS Aortogram.Multistation arteriogram right lower extremity. Laser atherectomy and angioplasty 08/28/2021    ALT 17 08/28/2021       POC Tests: No results for input(s): POCGLU, POCNA, POCK, POCCL, POCBUN, POCHEMO, POCHCT in the last 72 hours. Coags:   Lab Results   Component Value Date    PROTIME 14.9 03/23/2021    INR 1.18 03/23/2021    APTT 39.7 03/23/2021       HCG (If Applicable): No results found for: PREGTESTUR, PREGSERUM, HCG, HCGQUANT     ABGs:   Lab Results   Component Value Date    PHART 7.288 03/13/2021    PO2ART 44 03/13/2021    PZN0YQI 54 03/13/2021    HWB8KHO 35.3 03/06/2021    BEART -1 03/13/2021    D4QINHVE 73 03/13/2021        Type & Screen (If Applicable):  No results found for: LABABO, [address]    Anesthesia Evaluation  Patient summary reviewed and Nursing notes reviewed no history of anesthetic complications:   Airway: Mallampati: II  TM distance: >3 FB   Neck ROM: full  Mouth opening: > = 3 FB Dental: normal exam         Pulmonary:Negative Pulmonary ROS and normal exam  breath sounds clear to auscultation  (+) pneumonia: resolved,  COPD: no interval change,  shortness of breath:  recent URI: resolved,  sleep apnea: on CPAP,      (-) not a current smoker          Patient did not smoke on day of surgery.                  Cardiovascular:    (+) hypertension: no interval change, pacemaker: no interval change, dysrhythmias: atrial fibrillation, CHF:, MANCERA: no interval change, hyperlipidemia    (-) CAD and  angina    NYHA Classification: I  ECG reviewed  Rhythm: regular  Rate: normal           Beta Blocker:  Dose within 24 Hrs      ROS comment:     Type of Study      TTE procedure:ECHOCARDIOGRAM COMPLETE 2D WO COLOR DOPPLER.     Study Location: Portable  Technical Quality: Adequate visualization    Patient Status: Inpatient     Conclusions      Summary   Limited echo for ejection fraction.   Normal left ventricular cavity with overall normal systolic function.      Signature      ----------------------------------------------     Neuro/Psych:   Negative Neuro/Psych ROS     (-) seizures, CVA and depression/anxiety            GI/Hepatic/Renal: Neg GI/Hepatic/Renal ROS  (+) GERD:, liver disease:, renal disease: CRI and dialysis, morbid obesity     (-) hiatal hernia      ROS comment: Diverticultis . Endo/Other: Negative Endo/Other ROS   (+) DiabetesType II DM, , blood dyscrasia: anticoagulation therapy and anemia, arthritis: OA., malignancy/cancer. Pt had PAT visit. Abdominal:       Abdomen: soft. Vascular:   + PVD, aortic or cerebral (leg stent ), . Other Findings:               Anesthesia Plan      general     ASA 3     (Iv zofran within 30 min of closing )  Induction: intravenous. Anesthetic plan and risks discussed with patient.                       Bard Albarado, ELISABETH - CRNA   11/18/2021

## 2021-11-18 NOTE — H&P
Patient Name: Ludivina Solorio  : 1940  MRN: [id number]  DATE: 21    Allergies: Allergies   Allergen Reactions    Eliquis [Apixaban] Other (See Comments)     \"almost bled to death\"    Promethazine Hcl Other (See Comments)     He became encephalopathic for several hours and was not responsive. ENDOSCOPY  History and Physical    Procedure:    [] Diagnostic Colonoscopy       [] Screening Colonoscopy  [x] EGD      [] ERCP      [] EUS       [] Other    [x] Previous office notes/History and Physical reviewed from the patients chart. Please see EMR for further details of HPI. I have examined the patient's status immediately prior to the procedure and:      Indications/HPI:    1. Gastroesophageal reflux disease, unspecified whether esophagitis present    2. Constipation, unspecified constipation type    3. Uncomfortable fullness after meals    4. Belching      []Abdominal Pain   []Cancer- GI/Lung     []Fhx of colon CA/polyps  []History of Polyps  []Barretts            []Melena  []Abnormal Imaging              []Dysphagia              []Persistent Pneumonia   []Anemia                            []Food Impaction        []History of Polyps  [] GI Bleed             []Pulmonary nodule/Mass   []Change in bowel habits []Heartburn/Reflux  []Rectal Bleed (BRBPR)  []Chest Pain - Non Cardiac []Heme (+) Stool []Ulcers  []Constipation  []Hemoptysis  []Varices  []Diarrhea  []Hypoxemia    []Nausea/Vomiting   []Screening   []Crohns/Colitis  []Other:     Anesthesia:   [x] MAC [] Moderate Sedation   [] General   [] None     ROS: 12 pt Review of Symptoms was negative unless mentioned above    Medications:   Prior to Admission medications    Medication Sig Start Date End Date Taking?  Authorizing Provider   pantoprazole (PROTONIX) 20 MG tablet Take 20 mg by mouth daily   Yes Historical Provider, MD   Nutritional Supplements (VITAMIN D BOOSTER PO) Take by mouth   Yes Historical Provider, MD   SENNA PLUS 8.6-50 MG per tablet  10/26/21  Yes Historical Provider, MD   glipiZIDE (GLUCOTROL XL) 10 MG extended release tablet TAKE 1 TABLET BY MOUTH EVERY DAY 10/19/21  Yes Historical Provider, MD   dexlansoprazole (DEXILANT) 60 MG CPDR delayed release capsule Take 60 mg by mouth daily    Yes Historical Provider, MD   bumetanide (BUMEX) 1 MG tablet Take 1 mg by mouth daily   Yes Historical Provider, MD   amiodarone (CORDARONE) 200 MG tablet TAKE 1 TABLET BY MOUTH DAILY 9/9/21  Yes ELISABETH Trimble   mirabegron (MYRBETRIQ) 25 MG TB24 Take 1 tablet by mouth daily 8/16/21  Yes Peggy Encinas APRN - CNP   trospium (SANCTURA) 20 MG tablet Take 1 tablet by mouth daily 8/16/21  Yes Peggy Hampton APRN - CNP   atorvastatin (LIPITOR) 20 MG tablet Take 1 tablet by mouth daily 3/29/21  Yes Rajni Alvarado MD   metoprolol succinate (TOPROL XL) 100 MG extended release tablet Take 1 tablet by mouth daily 3/29/21  Yes Rajni Alvarado MD   NIFEdipine (ADALAT CC) 60 MG extended release tablet Take 1 tablet by mouth daily 3/29/21  Yes Rajni Alvarado MD   ferrous sulfate (IRON 325) 325 (65 Fe) MG tablet Take 1 tablet by mouth 2 times daily (with meals) 3/29/21  Yes Rajni Alvarado MD   folic acid (FOLVITE) 1 MG tablet Take 1 tablet by mouth daily 3/29/21  Yes Rajni Alvarado MD   tamsulosin [facility]) 0.4 MG capsule Take 1 capsule by mouth 2 times daily 3/29/21  Yes Rajni Alvarado MD   vitamin D (ERGOCALCIFEROL) 1.25 MG (81678 UT) CAPS capsule Take 1 capsule by mouth once a week 3/29/21  Yes Rajni Alvarado MD   OXYGEN Inhale 2 L into the lungs continuous 3/29/21  Yes Rajni Alvarado MD   aspirin 81 MG EC tablet Take 1 tablet by mouth daily 3/29/21   Rajni Alvarado MD       Past Medical History:  Past Medical History:   Diagnosis Date    Acute liver failure without hepatic coma 10/23/2018    Back pain     \"with tired legs as a result\"    Bladder cancer ([facility] [address] 12/19/2018    Blood circulation, collateral     Carotid arterial disease ([facility] [address]     recent surgery  CKD (chronic kidney disease), 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/2018    COPD with acute lower respiratory infection ([facility] [address] 02/24/2021    Epigastric discomfort     GERD (gastroesophageal reflux disease)     Hyperlipidemia     Hypertension     Hypertension     Palliative care patient 10/23/2018    Pneumonia due to infectious organism 11/06/2018    Primary osteoarthritis of left knee 10/14/2018    PVD [facility]peripheral vascular disease) ([facility])     Tremor     Tremor on Right side x 1-2 weeks per stepdaughter    Type 2 diabetes mellitus with complication, without long-term current use of insulin ([facility] [address] 01/21/2021       Past Surgical History:  Past Surgical History:   Procedure Laterality Date    BACK SURGERY      CARDIAC CATHETERIZATION  03/23/2021    100% RCA    COLONOSCOPY  2007?  CYSTOSCOPY Bilateral 12/19/2018    CYSTOSCOPY, BIOSPY FULGURATION OF BLADDER TUMOR POSSIBLE TURBT, RETROGRADE PYELOGRAM performed by Cathleen Hughes MD at [facility] COLONOSCOPY FLX DX W/COLLJ SPEC WHEN PFRMD N/A 09/11/2017    Dr Bartley Moritz internal hemorrhoids, diverticular disease-HP-No recall (age)   Chaya Vigil ID REVISE MEDIAN N/CARPAL TUNNEL SURG Left 07/18/2018    OPEN CARPAL TUNNEL RELEASE performed by Kristyn Mason MD at [address] Left 08/28/2018    LEFT CAROTID ENDARTERECTOMY WITH VEIN PATCH ANGIOPLASTY AND COMPLETION ANGIOGRAM performed by Franck Phelan MD at [address] 10/15/2018    LEFT COMPLEX TOTAL KNEE ARTHROPLASTY performed by Kristyn Mason MD [facility] VASCULAR SURGERY  04/21/2015    Marquez BESS Ultrasound guided access of left common femoral artery. Aortogram.Diagnostic right lower extremity arteriogram.Radiologic supervision and interpretation.  VASCULAR SURGERY  01/13/2015    Marquez Meza M.D Atherectomy,angioplasty,and stenting of left superficial femoral artery.     VASCULAR SURGERY  03/11/2014 Julio C Cheung M.D. Ultrasound-guided access of right common femoral artery. Aortogram.Left lower extremity arteriogram.Atherectomy and angioplasty of left superficial femoral artery. Radiologic supervision and interpretation.  VASCULAR SURGERY  2013    Julio C BESS Aortogram.Multistation arteriogram right lower extremity. Laser atherectomy and angioplasty of right superficial femoral artery. Selective catheterization of right tibioperoneal trunk. Angioplasty of peroneal artery and tibioperoneal trunk.  VASCULAR SURGERY  10/30/2018    [facility]. Ultrasound guided cannulation of right internal vein. Placement of right internal jugular vein tunneled dialysis catheter bard equistream xk 23cm tip to cuff    VASCULAR SURGERY  2018    [facility]. Removal of tunneled dilaysis catheter right internal jugular vein. Social History:  Social History     Tobacco Use    Smoking status: Former Smoker     Quit date: 6/3/2003     Years since quittin.4    Smokeless tobacco: Never Used   Vaping Use    Vaping Use: Never used   Substance Use Topics    Alcohol use: Yes     Alcohol/week: 12.0 standard drinks     Types: 12 Glasses of wine per week     Comment: 2 glasses of wine every night    Drug use: No       Vital Signs:   Vitals:    21 1105   BP: 119/61   Pulse: 60   Resp: 16   Temp: 97.8 °F (36.6 °C)   SpO2: 93%        Physical Exam:  Cardiac:  [x]WNL  []Comments:  Pulmonary:  [x]WNL   []Comments:  Neuro/Mental Status:  [x]WNL  []Comments:  Abdominal:  [x]WNL    []Comments:  Other:   []WNL  []Comments:    Informed Consent:  The risks and benefits of the procedure have been discussed with either the patient or if they cannot consent, their representative. Assessment:  Patient examined and appropriate for planned sedation and procedure. Plan:  Proceed with planned sedation and procedure as above.          Dandy Downey MD

## 2021-11-18 NOTE — TELEPHONE ENCOUNTER
Per Dr Sotelo Deaaudi,   Op note     Reglan  5 mg p.o. 3 times daily x 2 weeks  Order put in Epic-sent to [address] patient and wife Art Azul) and left messages regarding his F/U Apt & medication that was sent in        4. Repeat EGD for a more thorough examination of the stomach in 10 to 14 days after the patient has stayed on a clear liquid diet on the day before his EGD procedure.     Routed to [facility], THE LPN/

## 2021-11-23 ENCOUNTER — APPOINTMENT (OUTPATIENT)
Dept: GENERAL RADIOLOGY | Age: 81
DRG: 208 | End: 2021-11-23
Payer: MEDICARE

## 2021-11-23 ENCOUNTER — HOSPITAL ENCOUNTER (INPATIENT)
Age: 81
LOS: 7 days | Discharge: HOME HEALTH | DRG: 208 | End: 2021-11-30
Attending: EMERGENCY MEDICINE | Admitting: INTERNAL MEDICINE
Payer: MEDICARE

## 2021-11-23 DIAGNOSIS — I50.9 ACUTE ON CHRONIC CONGESTIVE HEART FAILURE, UNSPECIFIED HEART FAILURE TYPE (HCC): ICD-10-CM

## 2021-11-23 DIAGNOSIS — J96.22 ACUTE ON CHRONIC RESPIRATORY FAILURE WITH HYPOXIA AND HYPERCAPNIA (HCC): Primary | ICD-10-CM

## 2021-11-23 DIAGNOSIS — J96.21 ACUTE ON CHRONIC RESPIRATORY FAILURE WITH HYPOXIA AND HYPERCAPNIA (HCC): Primary | ICD-10-CM

## 2021-11-23 LAB
ALBUMIN SERPL-MCNC: 4.6 G/DL (ref 3.5–5.2)
ALP BLD-CCNC: 146 U/L (ref 40–130)
ALT SERPL-CCNC: 14 U/L (ref 5–41)
ANION GAP SERPL CALCULATED.3IONS-SCNC: 14 MMOL/L (ref 7–19)
APTT: 31 SEC (ref 26–36.2)
AST SERPL-CCNC: 15 U/L (ref 5–40)
BACTERIA: NEGATIVE /HPF
BASE EXCESS ARTERIAL: -2.6 MMOL/L (ref -2–2)
BASOPHILS ABSOLUTE: 0.1 K/UL (ref 0–0.2)
BASOPHILS RELATIVE PERCENT: 0.6 % (ref 0–1)
BILIRUB SERPL-MCNC: 0.8 MG/DL (ref 0.2–1.2)
BILIRUBIN URINE: NEGATIVE
BLOOD, URINE: NEGATIVE
BUN BLDV-MCNC: 34 MG/DL (ref 8–23)
CALCIUM SERPL-MCNC: 9.2 MG/DL (ref 8.8–10.2)
CARBOXYHEMOGLOBIN ARTERIAL: 0.9 % (ref 0–5)
CHLORIDE BLD-SCNC: 101 MMOL/L (ref 98–111)
CLARITY: CLEAR
CO2: 26 MMOL/L (ref 22–29)
COLOR: YELLOW
CREAT SERPL-MCNC: 3.1 MG/DL (ref 0.5–1.2)
CRYSTALS, UA: ABNORMAL /HPF
EOSINOPHILS ABSOLUTE: 0 K/UL (ref 0–0.6)
EOSINOPHILS RELATIVE PERCENT: 0 % (ref 0–5)
EPITHELIAL CELLS, UA: 1 /HPF (ref 0–5)
GFR AFRICAN AMERICAN: 24
GFR NON-AFRICAN AMERICAN: 19
GLUCOSE BLD-MCNC: 288 MG/DL (ref 70–99)
GLUCOSE BLD-MCNC: 292 MG/DL (ref 74–109)
GLUCOSE URINE: NEGATIVE MG/DL
HCO3 ARTERIAL: 30.3 MMOL/L (ref 22–26)
HCT VFR BLD CALC: 41.5 % (ref 42–52)
HEMOGLOBIN, ART, EXTENDED: 12.5 G/DL (ref 14–18)
HEMOGLOBIN: 12.5 G/DL (ref 14–18)
HYALINE CASTS: 5 /HPF (ref 0–8)
IMMATURE GRANULOCYTES #: 0.1 K/UL
INR BLD: 1.16 (ref 0.88–1.18)
KETONES, URINE: NEGATIVE MG/DL
LACTIC ACID, SEPSIS: 0.6 MG/DL (ref 0.5–1.9)
LACTIC ACID, SEPSIS: 1 MG/DL (ref 0.5–1.9)
LEUKOCYTE ESTERASE, URINE: NEGATIVE
LYMPHOCYTES ABSOLUTE: 3 K/UL (ref 1.1–4.5)
LYMPHOCYTES RELATIVE PERCENT: 20.9 % (ref 20–40)
MCH RBC QN AUTO: 28.3 PG (ref 27–31)
MCHC RBC AUTO-ENTMCNC: 30.1 G/DL (ref 33–37)
MCV RBC AUTO: 94.1 FL (ref 80–94)
METHEMOGLOBIN ARTERIAL: 0 %
MONOCYTES ABSOLUTE: 1 K/UL (ref 0–0.9)
MONOCYTES RELATIVE PERCENT: 6.9 % (ref 0–10)
NEUTROPHILS ABSOLUTE: 10.1 K/UL (ref 1.5–7.5)
NEUTROPHILS RELATIVE PERCENT: 70.7 % (ref 50–65)
NITRITE, URINE: NEGATIVE
O2 CONTENT ARTERIAL: 15.5 ML/DL
O2 SAT, ARTERIAL: 88.1 %
O2 THERAPY: ABNORMAL
PCO2 ARTERIAL: 107 MMHG (ref 35–45)
PDW BLD-RTO: 13.6 % (ref 11.5–14.5)
PERFORMED ON: ABNORMAL
PH ARTERIAL: 7.06 (ref 7.35–7.45)
PH UA: 5 (ref 5–8)
PLATELET # BLD: 207 K/UL (ref 130–400)
PMV BLD AUTO: 11.5 FL (ref 9.4–12.4)
PO2 ARTERIAL: 73 MMHG (ref 80–100)
POTASSIUM REFLEX MAGNESIUM: 4.3 MMOL/L (ref 3.5–5)
POTASSIUM, WHOLE BLOOD: 4.2
PRO-BNP: 4138 PG/ML (ref 0–1800)
PROTEIN UA: 100 MG/DL
PROTHROMBIN TIME: 15 SEC (ref 12–14.6)
RBC # BLD: 4.41 M/UL (ref 4.7–6.1)
RBC UA: 1 /HPF (ref 0–4)
SARS-COV-2, NAAT: NOT DETECTED
SODIUM BLD-SCNC: 141 MMOL/L (ref 136–145)
SPECIFIC GRAVITY UA: 1.02 (ref 1–1.03)
TOTAL PROTEIN: 7.5 G/DL (ref 6.6–8.7)
TROPONIN: 0.02 NG/ML (ref 0–0.03)
TROPONIN: 0.02 NG/ML (ref 0–0.03)
UROBILINOGEN, URINE: 1 E.U./DL
WBC # BLD: 14.2 K/UL (ref 4.8–10.8)
WBC UA: 1 /HPF (ref 0–5)

## 2021-11-23 PROCEDURE — 6370000000 HC RX 637 (ALT 250 FOR IP)

## 2021-11-23 PROCEDURE — 93005 ELECTROCARDIOGRAM TRACING: CPT | Performed by: EMERGENCY MEDICINE

## 2021-11-23 PROCEDURE — 2500000003 HC RX 250 WO HCPCS: Performed by: INTERNAL MEDICINE

## 2021-11-23 PROCEDURE — 2580000003 HC RX 258: Performed by: INTERNAL MEDICINE

## 2021-11-23 PROCEDURE — HZ2ZZZZ DETOXIFICATION SERVICES FOR SUBSTANCE ABUSE TREATMENT: ICD-10-PCS | Performed by: INTERNAL MEDICINE

## 2021-11-23 PROCEDURE — 94640 AIRWAY INHALATION TREATMENT: CPT

## 2021-11-23 PROCEDURE — 2700000000 HC OXYGEN THERAPY PER DAY

## 2021-11-23 PROCEDURE — 80053 COMPREHEN METABOLIC PANEL: CPT

## 2021-11-23 PROCEDURE — 99285 EMERGENCY DEPT VISIT HI MDM: CPT

## 2021-11-23 PROCEDURE — 71045 X-RAY EXAM CHEST 1 VIEW: CPT

## 2021-11-23 PROCEDURE — 87635 SARS-COV-2 COVID-19 AMP PRB: CPT

## 2021-11-23 PROCEDURE — 85025 COMPLETE CBC W/AUTO DIFF WBC: CPT

## 2021-11-23 PROCEDURE — 6370000000 HC RX 637 (ALT 250 FOR IP): Performed by: INTERNAL MEDICINE

## 2021-11-23 PROCEDURE — 36415 COLL VENOUS BLD VENIPUNCTURE: CPT

## 2021-11-23 PROCEDURE — 6360000002 HC RX W HCPCS

## 2021-11-23 PROCEDURE — 5A1935Z RESPIRATORY VENTILATION, LESS THAN 24 CONSECUTIVE HOURS: ICD-10-PCS | Performed by: EMERGENCY MEDICINE

## 2021-11-23 PROCEDURE — 6360000002 HC RX W HCPCS: Performed by: INTERNAL MEDICINE

## 2021-11-23 PROCEDURE — 87040 BLOOD CULTURE FOR BACTERIA: CPT

## 2021-11-23 PROCEDURE — 84484 ASSAY OF TROPONIN QUANT: CPT

## 2021-11-23 PROCEDURE — 85610 PROTHROMBIN TIME: CPT

## 2021-11-23 PROCEDURE — 96375 TX/PRO/DX INJ NEW DRUG ADDON: CPT

## 2021-11-23 PROCEDURE — 82947 ASSAY GLUCOSE BLOOD QUANT: CPT

## 2021-11-23 PROCEDURE — 84132 ASSAY OF SERUM POTASSIUM: CPT

## 2021-11-23 PROCEDURE — 83880 ASSAY OF NATRIURETIC PEPTIDE: CPT

## 2021-11-23 PROCEDURE — 96374 THER/PROPH/DIAG INJ IV PUSH: CPT

## 2021-11-23 PROCEDURE — 6360000002 HC RX W HCPCS: Performed by: EMERGENCY MEDICINE

## 2021-11-23 PROCEDURE — 0BH17EZ INSERTION OF ENDOTRACHEAL AIRWAY INTO TRACHEA, VIA NATURAL OR ARTIFICIAL OPENING: ICD-10-PCS | Performed by: EMERGENCY MEDICINE

## 2021-11-23 PROCEDURE — 81001 URINALYSIS AUTO W/SCOPE: CPT

## 2021-11-23 PROCEDURE — 83605 ASSAY OF LACTIC ACID: CPT

## 2021-11-23 PROCEDURE — 82803 BLOOD GASES ANY COMBINATION: CPT

## 2021-11-23 PROCEDURE — 85730 THROMBOPLASTIN TIME PARTIAL: CPT

## 2021-11-23 PROCEDURE — 2500000003 HC RX 250 WO HCPCS: Performed by: EMERGENCY MEDICINE

## 2021-11-23 PROCEDURE — 2000000000 HC ICU R&B

## 2021-11-23 PROCEDURE — 36600 WITHDRAWAL OF ARTERIAL BLOOD: CPT

## 2021-11-23 PROCEDURE — 31500 INSERT EMERGENCY AIRWAY: CPT

## 2021-11-23 RX ORDER — PROPOFOL 10 MG/ML
5-50 INJECTION, EMULSION INTRAVENOUS
Status: DISCONTINUED | OUTPATIENT
Start: 2021-11-23 | End: 2021-11-24

## 2021-11-23 RX ORDER — GAUZE BANDAGE 2" X 2"
100 BANDAGE TOPICAL DAILY
Status: DISCONTINUED | OUTPATIENT
Start: 2021-11-23 | End: 2021-11-30 | Stop reason: HOSPADM

## 2021-11-23 RX ORDER — ETOMIDATE 2 MG/ML
20 INJECTION INTRAVENOUS ONCE
Status: COMPLETED | OUTPATIENT
Start: 2021-11-23 | End: 2021-11-23

## 2021-11-23 RX ORDER — ASPIRIN 81 MG/1
81 TABLET ORAL DAILY
Status: DISCONTINUED | OUTPATIENT
Start: 2021-11-23 | End: 2021-11-30 | Stop reason: HOSPADM

## 2021-11-23 RX ORDER — IPRATROPIUM BROMIDE AND ALBUTEROL SULFATE 2.5; .5 MG/3ML; MG/3ML
1 SOLUTION RESPIRATORY (INHALATION) EVERY 4 HOURS
Status: DISCONTINUED | OUTPATIENT
Start: 2021-11-23 | End: 2021-11-30 | Stop reason: HOSPADM

## 2021-11-23 RX ORDER — SUCCINYLCHOLINE CHLORIDE 20 MG/ML
150 INJECTION INTRAMUSCULAR; INTRAVENOUS ONCE
Status: COMPLETED | OUTPATIENT
Start: 2021-11-23 | End: 2021-11-23

## 2021-11-23 RX ORDER — FUROSEMIDE 10 MG/ML
40 INJECTION INTRAMUSCULAR; INTRAVENOUS 2 TIMES DAILY
Status: DISCONTINUED | OUTPATIENT
Start: 2021-11-23 | End: 2021-11-29

## 2021-11-23 RX ORDER — VECURONIUM BROMIDE 1 MG/ML
10 INJECTION, POWDER, LYOPHILIZED, FOR SOLUTION INTRAVENOUS ONCE
Status: COMPLETED | OUTPATIENT
Start: 2021-11-23 | End: 2021-11-23

## 2021-11-23 RX ORDER — NIFEDIPINE 60 MG/1
60 TABLET, EXTENDED RELEASE ORAL DAILY
Status: DISCONTINUED | OUTPATIENT
Start: 2021-11-23 | End: 2021-11-30 | Stop reason: HOSPADM

## 2021-11-23 RX ORDER — ACETAMINOPHEN 650 MG/1
650 SUPPOSITORY RECTAL EVERY 6 HOURS PRN
Status: DISCONTINUED | OUTPATIENT
Start: 2021-11-23 | End: 2021-11-30 | Stop reason: HOSPADM

## 2021-11-23 RX ORDER — IPRATROPIUM BROMIDE AND ALBUTEROL SULFATE 2.5; .5 MG/3ML; MG/3ML
SOLUTION RESPIRATORY (INHALATION)
Status: COMPLETED
Start: 2021-11-23 | End: 2021-11-23

## 2021-11-23 RX ORDER — NICOTINE POLACRILEX 4 MG
15 LOZENGE BUCCAL PRN
Status: DISCONTINUED | OUTPATIENT
Start: 2021-11-23 | End: 2021-11-30 | Stop reason: HOSPADM

## 2021-11-23 RX ORDER — LORAZEPAM 2 MG/ML
2 INJECTION INTRAMUSCULAR
Status: DISCONTINUED | OUTPATIENT
Start: 2021-11-23 | End: 2021-11-30 | Stop reason: HOSPADM

## 2021-11-23 RX ORDER — FERROUS SULFATE 325(65) MG
325 TABLET ORAL 2 TIMES DAILY WITH MEALS
Status: DISCONTINUED | OUTPATIENT
Start: 2021-11-23 | End: 2021-11-30 | Stop reason: HOSPADM

## 2021-11-23 RX ORDER — LORAZEPAM 2 MG/ML
1 INJECTION INTRAMUSCULAR
Status: DISCONTINUED | OUTPATIENT
Start: 2021-11-23 | End: 2021-11-30 | Stop reason: HOSPADM

## 2021-11-23 RX ORDER — DEXTROSE MONOHYDRATE 50 MG/ML
100 INJECTION, SOLUTION INTRAVENOUS PRN
Status: DISCONTINUED | OUTPATIENT
Start: 2021-11-23 | End: 2021-11-30 | Stop reason: HOSPADM

## 2021-11-23 RX ORDER — BUMETANIDE 0.25 MG/ML
2 INJECTION, SOLUTION INTRAMUSCULAR; INTRAVENOUS ONCE
Status: COMPLETED | OUTPATIENT
Start: 2021-11-23 | End: 2021-11-23

## 2021-11-23 RX ORDER — LORAZEPAM 1 MG/1
2 TABLET ORAL
Status: DISCONTINUED | OUTPATIENT
Start: 2021-11-23 | End: 2021-11-30 | Stop reason: HOSPADM

## 2021-11-23 RX ORDER — ATORVASTATIN CALCIUM 40 MG/1
20 TABLET, FILM COATED ORAL DAILY
Status: DISCONTINUED | OUTPATIENT
Start: 2021-11-23 | End: 2021-11-30 | Stop reason: HOSPADM

## 2021-11-23 RX ORDER — ONDANSETRON 4 MG/1
4 TABLET, ORALLY DISINTEGRATING ORAL EVERY 8 HOURS PRN
Status: DISCONTINUED | OUTPATIENT
Start: 2021-11-23 | End: 2021-11-30 | Stop reason: HOSPADM

## 2021-11-23 RX ORDER — PROPOFOL 10 MG/ML
5-50 INJECTION, EMULSION INTRAVENOUS ONCE
Status: COMPLETED | OUTPATIENT
Start: 2021-11-23 | End: 2021-11-23

## 2021-11-23 RX ORDER — LORAZEPAM 2 MG/ML
4 INJECTION INTRAMUSCULAR
Status: DISCONTINUED | OUTPATIENT
Start: 2021-11-23 | End: 2021-11-28

## 2021-11-23 RX ORDER — LORAZEPAM 1 MG/1
3 TABLET ORAL
Status: DISCONTINUED | OUTPATIENT
Start: 2021-11-23 | End: 2021-11-28

## 2021-11-23 RX ORDER — METOCLOPRAMIDE 10 MG/1
5 TABLET ORAL 3 TIMES DAILY
Status: DISCONTINUED | OUTPATIENT
Start: 2021-11-23 | End: 2021-11-30 | Stop reason: HOSPADM

## 2021-11-23 RX ORDER — LORAZEPAM 1 MG/1
4 TABLET ORAL
Status: DISCONTINUED | OUTPATIENT
Start: 2021-11-23 | End: 2021-11-28

## 2021-11-23 RX ORDER — FOLIC ACID 1 MG/1
1 TABLET ORAL DAILY
Status: DISCONTINUED | OUTPATIENT
Start: 2021-11-23 | End: 2021-11-30 | Stop reason: HOSPADM

## 2021-11-23 RX ORDER — BUMETANIDE 1 MG/1
1 TABLET ORAL DAILY
Status: DISCONTINUED | OUTPATIENT
Start: 2021-11-23 | End: 2021-11-30 | Stop reason: HOSPADM

## 2021-11-23 RX ORDER — AMIODARONE HYDROCHLORIDE 200 MG/1
200 TABLET ORAL DAILY
Status: DISCONTINUED | OUTPATIENT
Start: 2021-11-23 | End: 2021-11-30 | Stop reason: HOSPADM

## 2021-11-23 RX ORDER — LORAZEPAM 1 MG/1
1 TABLET ORAL
Status: DISCONTINUED | OUTPATIENT
Start: 2021-11-23 | End: 2021-11-30 | Stop reason: HOSPADM

## 2021-11-23 RX ORDER — ACETAMINOPHEN 325 MG/1
650 TABLET ORAL EVERY 6 HOURS PRN
Status: DISCONTINUED | OUTPATIENT
Start: 2021-11-23 | End: 2021-11-30 | Stop reason: HOSPADM

## 2021-11-23 RX ORDER — IPRATROPIUM BROMIDE AND ALBUTEROL SULFATE 2.5; .5 MG/3ML; MG/3ML
1 SOLUTION RESPIRATORY (INHALATION) ONCE
Status: COMPLETED | OUTPATIENT
Start: 2021-11-23 | End: 2021-11-23

## 2021-11-23 RX ORDER — PROPOFOL 10 MG/ML
INJECTION, EMULSION INTRAVENOUS
Status: COMPLETED
Start: 2021-11-23 | End: 2021-11-23

## 2021-11-23 RX ORDER — METHYLPREDNISOLONE SODIUM SUCCINATE 40 MG/ML
40 INJECTION, POWDER, LYOPHILIZED, FOR SOLUTION INTRAMUSCULAR; INTRAVENOUS EVERY 8 HOURS
Status: DISCONTINUED | OUTPATIENT
Start: 2021-11-24 | End: 2021-11-28

## 2021-11-23 RX ORDER — ONDANSETRON 2 MG/ML
4 INJECTION INTRAMUSCULAR; INTRAVENOUS EVERY 6 HOURS PRN
Status: DISCONTINUED | OUTPATIENT
Start: 2021-11-23 | End: 2021-11-30 | Stop reason: HOSPADM

## 2021-11-23 RX ORDER — MULTIVITAMIN WITH IRON
1 TABLET ORAL DAILY
Status: DISCONTINUED | OUTPATIENT
Start: 2021-11-23 | End: 2021-11-30 | Stop reason: HOSPADM

## 2021-11-23 RX ORDER — LORAZEPAM 2 MG/ML
3 INJECTION INTRAMUSCULAR
Status: DISCONTINUED | OUTPATIENT
Start: 2021-11-23 | End: 2021-11-28

## 2021-11-23 RX ORDER — FOLIC ACID 1 MG/1
1 TABLET ORAL DAILY
Status: DISCONTINUED | OUTPATIENT
Start: 2021-11-23 | End: 2021-11-23

## 2021-11-23 RX ORDER — TAMSULOSIN HYDROCHLORIDE 0.4 MG/1
0.4 CAPSULE ORAL 2 TIMES DAILY
Status: DISCONTINUED | OUTPATIENT
Start: 2021-11-23 | End: 2021-11-30 | Stop reason: HOSPADM

## 2021-11-23 RX ORDER — DEXTROSE MONOHYDRATE 25 G/50ML
12.5 INJECTION, SOLUTION INTRAVENOUS PRN
Status: DISCONTINUED | OUTPATIENT
Start: 2021-11-23 | End: 2021-11-30 | Stop reason: HOSPADM

## 2021-11-23 RX ORDER — TROSPIUM CHLORIDE 20 MG/1
20 TABLET, FILM COATED ORAL DAILY
Status: DISCONTINUED | OUTPATIENT
Start: 2021-11-23 | End: 2021-11-30 | Stop reason: HOSPADM

## 2021-11-23 RX ORDER — DEXAMETHASONE SODIUM PHOSPHATE 10 MG/ML
10 INJECTION, SOLUTION INTRAMUSCULAR; INTRAVENOUS ONCE
Status: COMPLETED | OUTPATIENT
Start: 2021-11-23 | End: 2021-11-23

## 2021-11-23 RX ORDER — IPRATROPIUM BROMIDE AND ALBUTEROL SULFATE 2.5; .5 MG/3ML; MG/3ML
1 SOLUTION RESPIRATORY (INHALATION)
Status: DISCONTINUED | OUTPATIENT
Start: 2021-11-23 | End: 2021-11-23

## 2021-11-23 RX ORDER — METOPROLOL SUCCINATE 50 MG/1
100 TABLET, EXTENDED RELEASE ORAL DAILY
Status: DISCONTINUED | OUTPATIENT
Start: 2021-11-23 | End: 2021-11-30 | Stop reason: HOSPADM

## 2021-11-23 RX ADMIN — VECURONIUM BROMIDE 10 MG: 10 INJECTION, POWDER, LYOPHILIZED, FOR SOLUTION INTRAVENOUS at 17:37

## 2021-11-23 RX ADMIN — ASPIRIN 81 MG: 81 TABLET, COATED ORAL at 20:00

## 2021-11-23 RX ADMIN — IPRATROPIUM BROMIDE AND ALBUTEROL SULFATE 1 AMPULE: .5; 2.5 SOLUTION RESPIRATORY (INHALATION) at 17:00

## 2021-11-23 RX ADMIN — IPRATROPIUM BROMIDE AND ALBUTEROL SULFATE 1 AMPULE: 2.5; .5 SOLUTION RESPIRATORY (INHALATION) at 17:00

## 2021-11-23 RX ADMIN — SUCCINYLCHOLINE CHLORIDE 150 MG: 20 INJECTION, SOLUTION INTRAMUSCULAR; INTRAVENOUS at 17:20

## 2021-11-23 RX ADMIN — THERA TABS 1 TABLET: TAB at 20:00

## 2021-11-23 RX ADMIN — FUROSEMIDE 40 MG: 10 INJECTION, SOLUTION INTRAMUSCULAR; INTRAVENOUS at 19:59

## 2021-11-23 RX ADMIN — PROPOFOL 20 MCG/KG/MIN: 10 INJECTION, EMULSION INTRAVENOUS at 20:46

## 2021-11-23 RX ADMIN — ENOXAPARIN SODIUM 30 MG: 30 INJECTION SUBCUTANEOUS at 20:00

## 2021-11-23 RX ADMIN — PROPOFOL 10 MCG/KG/MIN: 10 INJECTION, EMULSION INTRAVENOUS at 17:34

## 2021-11-23 RX ADMIN — FERROUS SULFATE TAB 325 MG (65 MG ELEMENTAL FE) 325 MG: 325 (65 FE) TAB at 20:00

## 2021-11-23 RX ADMIN — NIFEDIPINE 60 MG: 60 TABLET, EXTENDED RELEASE ORAL at 20:00

## 2021-11-23 RX ADMIN — AMIODARONE HYDROCHLORIDE 200 MG: 200 TABLET ORAL at 20:00

## 2021-11-23 RX ADMIN — METOPROLOL SUCCINATE 100 MG: 50 TABLET, EXTENDED RELEASE ORAL at 19:59

## 2021-11-23 RX ADMIN — TAMSULOSIN HYDROCHLORIDE 0.4 MG: 0.4 CAPSULE ORAL at 20:01

## 2021-11-23 RX ADMIN — DEXAMETHASONE SODIUM PHOSPHATE 10 MG: 10 INJECTION INTRAMUSCULAR; INTRAVENOUS at 16:43

## 2021-11-23 RX ADMIN — DOXYCYCLINE 100 MG: 100 INJECTION, POWDER, LYOPHILIZED, FOR SOLUTION INTRAVENOUS at 20:43

## 2021-11-23 RX ADMIN — FOLIC ACID 1 MG: 1 TABLET ORAL at 20:00

## 2021-11-23 RX ADMIN — FAMOTIDINE 20 MG: 10 INJECTION, SOLUTION INTRAVENOUS at 19:59

## 2021-11-23 RX ADMIN — TROSPIUM CHLORIDE 20 MG: 20 TABLET, FILM COATED ORAL at 20:00

## 2021-11-23 RX ADMIN — IPRATROPIUM BROMIDE AND ALBUTEROL SULFATE 1 AMPULE: .5; 2.5 SOLUTION RESPIRATORY (INHALATION) at 22:30

## 2021-11-23 RX ADMIN — BUMETANIDE 2 MG: 0.25 INJECTION, SOLUTION INTRAMUSCULAR; INTRAVENOUS at 17:54

## 2021-11-23 RX ADMIN — INSULIN LISPRO 2 UNITS: 100 INJECTION, SOLUTION INTRAVENOUS; SUBCUTANEOUS at 20:35

## 2021-11-23 RX ADMIN — METOCLOPRAMIDE 5 MG: 10 TABLET ORAL at 20:01

## 2021-11-23 RX ADMIN — ETOMIDATE 20 MG: 20 INJECTION, SOLUTION INTRAVENOUS at 17:20

## 2021-11-23 RX ADMIN — THIAMINE HCL TAB 100 MG 100 MG: 100 TAB at 19:59

## 2021-11-23 RX ADMIN — ATORVASTATIN CALCIUM 20 MG: 40 TABLET, FILM COATED ORAL at 19:59

## 2021-11-23 ASSESSMENT — ENCOUNTER SYMPTOMS
COUGH: 0
SHORTNESS OF BREATH: 1
ABDOMINAL PAIN: 0
VOMITING: 0

## 2021-11-23 ASSESSMENT — PULMONARY FUNCTION TESTS
PIF_VALUE: 31
PIF_VALUE: 34
PIF_VALUE: 29
PIF_VALUE: 27
PIF_VALUE: 32
PIF_VALUE: 28
PIF_VALUE: 30

## 2021-11-23 ASSESSMENT — PAIN SCALES - GENERAL
PAINLEVEL_OUTOF10: 0
PAINLEVEL_OUTOF10: 0

## 2021-11-23 NOTE — ED NOTES
Spoke to wife and she notes that the pt would wish to be a full code and wife notes that he would want to be intubated and placed on a ventilator if needed.       Macy Libman, RN  11/23/21 [id number]

## 2021-11-23 NOTE — PROGRESS NOTES
Contains critical data Blood Gas, Arterial     Status: Final result    Component Ref Range & Units 11/23/21 1655   pH, Arterial 7.350 - 7.450 7.060 Low Panic     pCO2, Arterial 35.0 - 45.0 mmHg 107. 0 High Panic     pO2, Arterial 80.0 - 100.0 mmHg 73.0 Low     HCO3, Arterial 22.0 - 26.0 mmol/L 30.3 High     Base Excess, Arterial -2.0 - 2.0 mmol/L -2.6 Low     Hemoglobin, Art, Extended 14.0 - 18.0 g/dL 12.5 Low     O2 Sat, Arterial >92 % 88.1 Low     Carboxyhgb, Arterial 0.0 - 5.0 % 0.9    Comment:      0.0-1.5   (Smokers 1.5-5.0)    Methemoglobin, Arterial <1.5 % 0.0    O2 Content, Arterial Not Established mL/dL 15.5    O2 Therapy  Unknown    Resulting Agency  [address] [facility]     Narrative    CALL  Orick Selina Vale RN ER, 11/23/2021 16:56, by [email or web address]        Specimen Collected: 11/23/21 1655 Last Resulted: 11/23/21 1656 View Other Order Details        Pt on Bipap 14/7 with a BUR 18 and 25 lpm O2 for approx 15 min.

## 2021-11-23 NOTE — ED NOTES
Bed: 02-A  Expected date:   Expected time:   Means of arrival:   Comments:  EMS     Delma Toussaint RN  11/23/21 [id number]

## 2021-11-24 PROBLEM — J44.1 COPD WITH EXACERBATION (HCC): Status: ACTIVE | Noted: 2021-11-24

## 2021-11-24 LAB
ALBUMIN SERPL-MCNC: 3.9 G/DL (ref 3.5–5.2)
ALP BLD-CCNC: 120 U/L (ref 40–130)
ALT SERPL-CCNC: 12 U/L (ref 5–41)
ANION GAP SERPL CALCULATED.3IONS-SCNC: 16 MMOL/L (ref 7–19)
AST SERPL-CCNC: 10 U/L (ref 5–40)
BASE EXCESS ARTERIAL: 3.5 MMOL/L (ref -2–2)
BILIRUB SERPL-MCNC: 0.8 MG/DL (ref 0.2–1.2)
BUN BLDV-MCNC: 34 MG/DL (ref 8–23)
CALCIUM SERPL-MCNC: 8.9 MG/DL (ref 8.8–10.2)
CARBOXYHEMOGLOBIN ARTERIAL: 0.4 % (ref 0–5)
CHLORIDE BLD-SCNC: 98 MMOL/L (ref 98–111)
CO2: 24 MMOL/L (ref 22–29)
CREAT SERPL-MCNC: 3 MG/DL (ref 0.5–1.2)
EKG P AXIS: NORMAL DEGREES
EKG P-R INTERVAL: NORMAL MS
EKG Q-T INTERVAL: 406 MS
EKG QRS DURATION: 164 MS
EKG QTC CALCULATION (BAZETT): 413 MS
EKG T AXIS: -177 DEGREES
GFR AFRICAN AMERICAN: 24
GFR NON-AFRICAN AMERICAN: 20
GLUCOSE BLD-MCNC: 131 MG/DL (ref 70–99)
GLUCOSE BLD-MCNC: 142 MG/DL (ref 70–99)
GLUCOSE BLD-MCNC: 220 MG/DL (ref 70–99)
GLUCOSE BLD-MCNC: 266 MG/DL (ref 70–99)
GLUCOSE BLD-MCNC: 289 MG/DL (ref 74–109)
HCO3 ARTERIAL: 27.8 MMOL/L (ref 22–26)
HEMOGLOBIN, ART, EXTENDED: 11.4 G/DL (ref 14–18)
MAGNESIUM: 2.1 MG/DL (ref 1.6–2.4)
METHEMOGLOBIN ARTERIAL: 0 %
O2 CONTENT ARTERIAL: 14.9 ML/DL
O2 SAT, ARTERIAL: 92.9 %
O2 THERAPY: ABNORMAL
PCO2 ARTERIAL: 40 MMHG (ref 35–45)
PERFORMED ON: ABNORMAL
PH ARTERIAL: 7.45 (ref 7.35–7.45)
PO2 ARTERIAL: 72 MMHG (ref 80–100)
POTASSIUM SERPL-SCNC: 4.1 MMOL/L (ref 3.5–5)
POTASSIUM, WHOLE BLOOD: 3.5
SODIUM BLD-SCNC: 138 MMOL/L (ref 136–145)
TOTAL PROTEIN: 6 G/DL (ref 6.6–8.7)
TROPONIN: 0.02 NG/ML (ref 0–0.03)

## 2021-11-24 PROCEDURE — 84132 ASSAY OF SERUM POTASSIUM: CPT

## 2021-11-24 PROCEDURE — 94640 AIRWAY INHALATION TREATMENT: CPT

## 2021-11-24 PROCEDURE — 2500000003 HC RX 250 WO HCPCS: Performed by: INTERNAL MEDICINE

## 2021-11-24 PROCEDURE — 99222 1ST HOSP IP/OBS MODERATE 55: CPT | Performed by: PHYSICIAN ASSISTANT

## 2021-11-24 PROCEDURE — 94002 VENT MGMT INPAT INIT DAY: CPT

## 2021-11-24 PROCEDURE — 83735 ASSAY OF MAGNESIUM: CPT

## 2021-11-24 PROCEDURE — 36600 WITHDRAWAL OF ARTERIAL BLOOD: CPT

## 2021-11-24 PROCEDURE — 82803 BLOOD GASES ANY COMBINATION: CPT

## 2021-11-24 PROCEDURE — 2580000003 HC RX 258: Performed by: INTERNAL MEDICINE

## 2021-11-24 PROCEDURE — 82947 ASSAY GLUCOSE BLOOD QUANT: CPT

## 2021-11-24 PROCEDURE — 6360000002 HC RX W HCPCS: Performed by: INTERNAL MEDICINE

## 2021-11-24 PROCEDURE — 6370000000 HC RX 637 (ALT 250 FOR IP): Performed by: INTERNAL MEDICINE

## 2021-11-24 PROCEDURE — 84484 ASSAY OF TROPONIN QUANT: CPT

## 2021-11-24 PROCEDURE — 1210000000 HC MED SURG R&B

## 2021-11-24 PROCEDURE — 2700000000 HC OXYGEN THERAPY PER DAY

## 2021-11-24 PROCEDURE — 93010 ELECTROCARDIOGRAM REPORT: CPT | Performed by: INTERNAL MEDICINE

## 2021-11-24 PROCEDURE — 36415 COLL VENOUS BLD VENIPUNCTURE: CPT

## 2021-11-24 PROCEDURE — 80053 COMPREHEN METABOLIC PANEL: CPT

## 2021-11-24 RX ADMIN — NIFEDIPINE 60 MG: 60 TABLET, EXTENDED RELEASE ORAL at 07:56

## 2021-11-24 RX ADMIN — FOLIC ACID 1 MG: 1 TABLET ORAL at 07:56

## 2021-11-24 RX ADMIN — FAMOTIDINE 20 MG: 10 INJECTION, SOLUTION INTRAVENOUS at 20:02

## 2021-11-24 RX ADMIN — DOXYCYCLINE 100 MG: 100 INJECTION, POWDER, LYOPHILIZED, FOR SOLUTION INTRAVENOUS at 07:56

## 2021-11-24 RX ADMIN — INSULIN LISPRO 2 UNITS: 100 INJECTION, SOLUTION INTRAVENOUS; SUBCUTANEOUS at 11:57

## 2021-11-24 RX ADMIN — METHYLPREDNISOLONE SODIUM SUCCINATE 40 MG: 40 INJECTION, POWDER, FOR SOLUTION INTRAMUSCULAR; INTRAVENOUS at 07:57

## 2021-11-24 RX ADMIN — FERROUS SULFATE TAB 325 MG (65 MG ELEMENTAL FE) 325 MG: 325 (65 FE) TAB at 16:11

## 2021-11-24 RX ADMIN — METOCLOPRAMIDE 5 MG: 10 TABLET ORAL at 07:57

## 2021-11-24 RX ADMIN — ENOXAPARIN SODIUM 30 MG: 30 INJECTION SUBCUTANEOUS at 20:02

## 2021-11-24 RX ADMIN — FUROSEMIDE 40 MG: 10 INJECTION, SOLUTION INTRAMUSCULAR; INTRAVENOUS at 07:57

## 2021-11-24 RX ADMIN — ASPIRIN 81 MG: 81 TABLET, COATED ORAL at 07:57

## 2021-11-24 RX ADMIN — IPRATROPIUM BROMIDE AND ALBUTEROL SULFATE 1 AMPULE: .5; 2.5 SOLUTION RESPIRATORY (INHALATION) at 14:35

## 2021-11-24 RX ADMIN — INSULIN LISPRO 1 UNITS: 100 INJECTION, SOLUTION INTRAVENOUS; SUBCUTANEOUS at 20:03

## 2021-11-24 RX ADMIN — IPRATROPIUM BROMIDE AND ALBUTEROL SULFATE 1 AMPULE: .5; 2.5 SOLUTION RESPIRATORY (INHALATION) at 21:33

## 2021-11-24 RX ADMIN — FERROUS SULFATE TAB 325 MG (65 MG ELEMENTAL FE) 325 MG: 325 (65 FE) TAB at 07:57

## 2021-11-24 RX ADMIN — METOCLOPRAMIDE 5 MG: 10 TABLET ORAL at 20:03

## 2021-11-24 RX ADMIN — TAMSULOSIN HYDROCHLORIDE 0.4 MG: 0.4 CAPSULE ORAL at 20:02

## 2021-11-24 RX ADMIN — PROPOFOL 30 MCG/KG/MIN: 10 INJECTION, EMULSION INTRAVENOUS at 08:31

## 2021-11-24 RX ADMIN — METOPROLOL SUCCINATE 100 MG: 50 TABLET, EXTENDED RELEASE ORAL at 10:20

## 2021-11-24 RX ADMIN — IPRATROPIUM BROMIDE AND ALBUTEROL SULFATE 1 AMPULE: .5; 2.5 SOLUTION RESPIRATORY (INHALATION) at 18:25

## 2021-11-24 RX ADMIN — METHYLPREDNISOLONE SODIUM SUCCINATE 40 MG: 40 INJECTION, POWDER, FOR SOLUTION INTRAMUSCULAR; INTRAVENOUS at 23:42

## 2021-11-24 RX ADMIN — METOCLOPRAMIDE 5 MG: 10 TABLET ORAL at 13:35

## 2021-11-24 RX ADMIN — THERA TABS 1 TABLET: TAB at 07:57

## 2021-11-24 RX ADMIN — TAMSULOSIN HYDROCHLORIDE 0.4 MG: 0.4 CAPSULE ORAL at 07:56

## 2021-11-24 RX ADMIN — INSULIN LISPRO 3 UNITS: 100 INJECTION, SOLUTION INTRAVENOUS; SUBCUTANEOUS at 07:57

## 2021-11-24 RX ADMIN — METHYLPREDNISOLONE SODIUM SUCCINATE 40 MG: 40 INJECTION, POWDER, FOR SOLUTION INTRAMUSCULAR; INTRAVENOUS at 16:11

## 2021-11-24 RX ADMIN — DOXYCYCLINE 100 MG: 100 INJECTION, POWDER, LYOPHILIZED, FOR SOLUTION INTRAVENOUS at 20:02

## 2021-11-24 RX ADMIN — IPRATROPIUM BROMIDE AND ALBUTEROL SULFATE 1 AMPULE: .5; 2.5 SOLUTION RESPIRATORY (INHALATION) at 02:57

## 2021-11-24 RX ADMIN — AMIODARONE HYDROCHLORIDE 200 MG: 200 TABLET ORAL at 09:39

## 2021-11-24 RX ADMIN — PROPOFOL 30 MCG/KG/MIN: 10 INJECTION, EMULSION INTRAVENOUS at 01:28

## 2021-11-24 RX ADMIN — METHYLPREDNISOLONE SODIUM SUCCINATE 40 MG: 40 INJECTION, POWDER, FOR SOLUTION INTRAMUSCULAR; INTRAVENOUS at 00:20

## 2021-11-24 RX ADMIN — ATORVASTATIN CALCIUM 20 MG: 40 TABLET, FILM COATED ORAL at 07:57

## 2021-11-24 RX ADMIN — TROSPIUM CHLORIDE 20 MG: 20 TABLET, FILM COATED ORAL at 07:57

## 2021-11-24 RX ADMIN — THIAMINE HCL TAB 100 MG 100 MG: 100 TAB at 07:56

## 2021-11-24 RX ADMIN — FUROSEMIDE 40 MG: 10 INJECTION, SOLUTION INTRAMUSCULAR; INTRAVENOUS at 17:39

## 2021-11-24 RX ADMIN — IPRATROPIUM BROMIDE AND ALBUTEROL SULFATE 1 AMPULE: .5; 2.5 SOLUTION RESPIRATORY (INHALATION) at 10:59

## 2021-11-24 RX ADMIN — IPRATROPIUM BROMIDE AND ALBUTEROL SULFATE 1 AMPULE: .5; 2.5 SOLUTION RESPIRATORY (INHALATION) at 06:56

## 2021-11-24 ASSESSMENT — PULMONARY FUNCTION TESTS
PIF_VALUE: 30
PIF_VALUE: 33
PIF_VALUE: 24
PIF_VALUE: 30
PIF_VALUE: 28
PIF_VALUE: 27
PIF_VALUE: 30
PIF_VALUE: 27
PIF_VALUE: 27
PIF_VALUE: 29
PIF_VALUE: 23
PIF_VALUE: 29

## 2021-11-24 ASSESSMENT — PAIN SCALES - GENERAL
PAINLEVEL_OUTOF10: 0

## 2021-11-24 NOTE — PROGRESS NOTES
4 Eyes Skin Assessment    Isatu Connelly is being assessed upon: Admission    I agree that Sebas Araya RN, along with Uriah Skinner (either 2 RN's or 1 LPN and 1 RN) have performed a thorough Head to Toe Skin Assessment on the patient. ALL assessment sites listed below have been assessed. Areas assessed by both nurses:     [x]   Head, Face, and Ears   [x]   Shoulders, Back, and Chest  [x]   Arms, Elbows, and Hands   [x]   Coccyx, Sacrum, and Ischium  [x]   Legs, Feet, and Heels    Does the Patient have Skin Breakdown?  No    Ángel Prevention initiated: Yes  Wound Care Orders initiated: No    WOC nurse consulted for Pressure Injury (Stage 3,4, Unstageable, DTI, NWPT, and Complex wounds) and New or Established Ostomies: No        Primary Nurse eSignature: Caitlyn Burrell RN on 11/23/2021 at 11:37 PM      Co-Signer eSignature: Electronically signed by Josué Machado RN on 11/23/21 at 11:37 PM CST

## 2021-11-24 NOTE — CONSULTS
**Physician Signature**  This document was electronically signed by: Aidan Long MD  2021   10:15 PM    **Consult Information**  Member Facility: [address] [place] [address] MRN: [id number]  Visit/Encounter Number: [id number]  Consult ID: [id number]  Facility Time Zone: CT  Date and Time of Request: 2021 09:59 PM  CT  Requesting Clinician: Conchita De La Cruz MD  Patient Name: Laureano Arechiga  Date of Birth:   Gender: Male  Patient identity was confirmed at the beginning of the consult with the   patient/family/staff using two personal identifiers: Patient name and       **Reason for Consult**  Reason for Consult: [facility]    **Admission**  Admission Date: 2021  Chief reason for ICU admission: Respiratory Failure  Secondary reasons for ICU admission: COPD Exacerbation, CHF    **Core Metrics**  General orienting sentence for patient: [de-identified] yo male w PPM, CHF, COPD admitted   with dyspnea and PCO2 107, tried NIV, had sig rhonchii, needed ultimately   intubated, good u/o to lasix, no pressors, Fio2 55%, PEEP 5  Chief physiologic deterioration: Increase in FiO2 required by   30%  Is the patient on DVT prophylaxis?: Yes  Prophylaxis type: Pharmacological  DVT Prophylaxis Comments: lovnox  Is the patient on GI prophylaxis?: Yes  Gi Prophylaxis Comments: PPI  Has this patient reached their nutritional goal?: No and issues are being   addressed  Are there current issues with pain management in this patient?:   No  Are there issues with skin integrity?: No  Are there issues with delirium?: No  Has the patient been mobilized?: No  Is this patient currently intubated?: Yes  Has facility initiated vent bundle?: Yes  Are there ethical or care philosophy or family issues?: No  Do you recommend an in depth evaluation?: No  Disposition Recommendations: Continue ICU level of care    **Inserted/Removed Devices**  Device Name: Endotracheal Tube  Site of insertion: Trachea  Date of insertion: 11-  Device Name: Orogastric Tube  Site of insertion: Mouth  Date of insertion: 11-  Device Name: Shultz Catheter  Site of insertion: Bladder  Date of insertion: 11-    **Physician Signature**  This document was electronically signed by: Giorgio Ch MD  11/23/2021   10:15 PM

## 2021-11-24 NOTE — PROGRESS NOTES
Pharmacy Renal Adjustment    Layla Landers is a [de-identified] y.o. male. Pharmacy has renally adjusted medications per protocol. Recent Labs     11/23/21  1627   BUN 34*       Recent Labs     11/23/21  1627   CREATININE 3.1*       Estimated Creatinine Clearance: 24 mL/min (A) (based on SCr of 3.1 mg/dL (H)).     Height:   Ht Readings from Last 1 Encounters:   11/18/21 6' (1.829 m)     Weight:  Wt Readings from Last 1 Encounters:   11/18/21 229 lb (103.9 kg)     Plan: Adjust the following medications based on renal function:           Famotidine 20 mg IV every 12 hours to 20 mg IV every 24 hours    Electronically signed by Roopa Leroy, [facility] on 11/23/2021 at 7:41 PM

## 2021-11-24 NOTE — PLAN OF CARE
Problem: Skin Integrity:  Goal: Will show no infection signs and symptoms  Description: Will show no infection signs and symptoms  11/24/2021 0949 by Joanna Batista RN  Outcome: Ongoing  11/23/2021 2336 by Samia Kilpatrick RN  Outcome: Ongoing  Goal: Absence of new skin breakdown  Description: Absence of new skin breakdown  11/24/2021 0949 by Joanna Batista RN  Outcome: Ongoing  11/23/2021 2336 by Samia Kilpatrick RN  Outcome: Ongoing     Problem: Falls - Risk of:  Goal: Will remain free from falls  Description: Will remain free from falls  11/24/2021 0949 by Joanna Batista RN  Outcome: Ongoing  11/23/2021 2336 by Samia Kilpatrick RN  Outcome: Ongoing  Goal: Absence of physical injury  Description: Absence of physical injury  11/24/2021 0949 by Joanna Batista RN  Outcome: Ongoing  11/23/2021 2336 by Samia Kilpatrick RN  Outcome: Ongoing

## 2021-11-24 NOTE — PROGRESS NOTES
Hospitalist Progress Note  [facility]     Patient: Alejandro Goldsmith  : 1940  MRN: [id number]  Code Status: Full Code    Hospital Day: 1   Date of Service: 2021    Subjective:   Patient seen and examined. Intubated and sedated. Past Medical History:   Diagnosis Date    Acute liver failure without hepatic coma 10/23/2018    Back pain     \"with tired legs as a result\"    Bladder cancer ([facility] 2018    Blood circulation, collateral     Carotid arterial disease (HCC)     recent surgery    CKD (chronic kidney disease), 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/2018    COPD with acute lower respiratory infection ([address] 2021    Epigastric discomfort     GERD (gastroesophageal reflux disease)     Hyperlipidemia     Hypertension     Hypertension     Palliative care patient 10/23/2018    Pneumonia due to infectious organism 2018    Primary osteoarthritis of left knee 10/14/2018    PVD (peripheral vascular disease) (HCC)     Tremor     Tremor on Right side x 1-2 weeks per stepdaughter    Type 2 diabetes mellitus with complication, without long-term current use of insulin ([facility] 2021       Past Surgical History:   Procedure Laterality Date    BACK SURGERY      CARDIAC CATHETERIZATION  2021    100% RCA    COLONOSCOPY  2007?     CYSTOSCOPY Bilateral 2018    CYSTOSCOPY, BIOSPY FULGURATION OF BLADDER TUMOR POSSIBLE TURBT, RETROGRADE PYELOGRAM performed by Dasia Rivera MD at [address] COLONOSCOPY FLX DX W/COLLJ SPEC WHEN PFRMD N/A 2017    Dr Eve Prieto internal hemorrhoids, diverticular disease-HP-No recall (age)   Naveen Hinders [place] REVISE MEDIAN N/CARPAL TUNNEL SURG Left 2018    OPEN CARPAL TUNNEL RELEASE performed by Julius Porras MD at [address] Left 2018    LEFT CAROTID ENDARTERECTOMY WITH VEIN PATCH ANGIOPLASTY AND COMPLETION ANGIOGRAM performed by Dominique Olivia MD at [facility] Left 10/15/2018    LEFT COMPLEX TOTAL KNEE ARTHROPLASTY performed by Tao Campos MD at [address] [place] [address] GASTROINTESTINAL ENDOSCOPY N/A 11/18/2021    Dr Cedrick Lee, Sub prep lg amount of solid and semisolid food/Medication in stomach body/antrum/pylorus, stomach lumen appeared to distended w air insufflation and collapse upon suction,   inadequate exam sugg of gastroparesis, repeat in 10-14 days    VASCULAR SURGERY  04/21/2015    Lissette BESS Ultrasound guided access of left common femoral artery. Aortogram.Diagnostic right lower extremity arteriogram.Radiologic supervision and interpretation.  VASCULAR SURGERY  01/13/2015    Lissette Gao M.D Atherectomy,angioplasty,and stenting of left superficial femoral artery.  VASCULAR SURGERY  03/11/2014    Lissette Gao M.D. Ultrasound-guided access of right common femoral artery. Aortogram.Left lower extremity arteriogram.Atherectomy and angioplasty of left superficial femoral artery. Radiologic supervision and interpretation.  VASCULAR SURGERY  01/18/2013    Lissette BESS Aortogram.Multistation arteriogram right lower extremity. Laser atherectomy and angioplasty of right superficial femoral artery. Selective catheterization of right tibioperoneal trunk. Angioplasty of peroneal artery and tibioperoneal trunk.  VASCULAR SURGERY  10/30/2018    [facility]. Ultrasound guided cannulation of right internal vein. Placement of right internal jugular vein tunneled dialysis catheter bard equistream xk 23cm tip to cuff    VASCULAR SURGERY  12/17/2018    [facility]. Removal of tunneled dilaysis catheter right internal jugular vein.        Family History   Problem Relation Age of Onset    Colon Cancer Father     Diabetes Brother     Colon Polyps Neg Hx     Liver Cancer Neg Hx     Liver Disease Neg Hx     Esophageal Cancer Neg Hx     Rectal Cancer Neg Hx     Stomach Cancer Neg Hx        Social History     Socioeconomic History    Marital status:      Spouse name: Not on file    Number of children: Not on file    Years of education: Not on file    Highest education level: Not on file   Occupational History    Not on file   Tobacco Use    Smoking status: Former Smoker     Quit date: 6/3/2003     Years since quittin.4    Smokeless tobacco: Never Used   Vaping Use    Vaping Use: Never used   Substance and Sexual Activity    Alcohol use: Yes     Alcohol/week: 12.0 standard drinks     Types: 12 Glasses of wine per week     Comment: 2 glasses of wine every night    Drug use: No    Sexual activity: Yes     Partners: Female   Other Topics Concern    Not on file   Social History Narrative    Not on file     Social Determinants of Health     Financial Resource Strain:     Difficulty of Paying Living Expenses: Not on file   Food Insecurity:     Worried About Running Out of Food in the Last Year: Not on file    Ankit of Food in the Last Year: Not on file   Transportation Needs:     Lack of Transportation (Medical): Not on file    Lack of Transportation (Non-Medical):  Not on file   Physical Activity:     Days of Exercise per Week: Not on file    Minutes of Exercise per Session: Not on file   Stress:     Feeling of Stress : Not on file   Social Connections:     Frequency of Communication with Friends and Family: Not on file    Frequency of Social Gatherings with Friends and Family: Not on file    Attends [employer] Services: Not on file    Active Member of Clubs or Organizations: Not on file    Attends Club or Organization Meetings: Not on file    Marital Status: Not on file   Intimate Partner Violence:     Fear of Current or Ex-Partner: Not on file    Emotionally Abused: Not on file    Physically Abused: Not on file    Sexually Abused: Not on file   Housing Stability:     Unable to Pay for Housing in the Last Year: Not on file    Number of [place] in the Last Year: Not on file    Unstable Housing in the Last Year: Not on file       Current Facility-Administered Medications   Medication Dose Route Frequency Provider Last Rate Last Admin    amiodarone (CORDARONE) tablet 200 mg  200 mg Oral Daily Gianna Abdul MD   200 mg at 11/24/21 0939    aspirin EC tablet 81 mg  81 mg Oral Daily Gianna Abdul MD   81 mg at 11/24/21 0757    atorvastatin (LIPITOR) tablet 20 mg  20 mg Oral Daily Gianna Abdul MD   20 mg at 11/24/21 0757    [Held by provider] bumetanide (BUMEX) tablet 1 mg  1 mg Oral Daily Gianna Abdul MD        ferrous sulfate (IRON 325) tablet 325 mg  325 mg Oral BID  Gianna Abdul MD   325 mg at 78/73/72 8858    folic acid (FOLVITE) tablet 1 mg  1 mg Oral Daily Gianna Abdul MD   1 mg at 11/24/21 0756    metoclopramide (REGLAN) tablet 5 mg  5 mg Oral TID Gianna Abdul MD   5 mg at 11/24/21 0757    metoprolol succinate (TOPROL XL) extended release tablet 100 mg  100 mg Oral Daily Gianna Abdul MD   100 mg at 11/23/21 1959    NIFEdipine (PROCARDIA XL) extended release tablet 60 mg  60 mg Oral Daily Gianna Abdul MD   60 mg at 11/24/21 0756    tamsulosin (FLOMAX) capsule 0.4 mg  0.4 mg Oral BID Gianna Abdul MD   0.4 mg at 11/24/21 0756    trospium (SANCTURA) tablet 20 mg  20 mg Oral Daily Gianna Abdul MD   20 mg at 11/24/21 0757    insulin lispro (HUMALOG) injection vial 0-6 Units  0-6 Units SubCUTAneous TID  Gianna Abdul MD   3 Units at 11/24/21 0757    insulin lispro (HUMALOG) injection vial 0-3 Units  0-3 Units SubCUTAneous Nightly Gianna Abdul MD   2 Units at 11/23/21 2035    ondansetron (ZOFRAN-ODT) disintegrating tablet 4 mg  4 mg Oral Q8H PRN Gianna Abdul MD        Or    [facility] injection 4 mg  4 mg IntraVENous Q6H PRN Gianna Abdul MD        acetaminophen (TYLENOL) tablet 650 mg  650 mg Oral Q6H PRN Gianna Abdul MD        Or    acetaminophen (TYLENOL) suppository 650 mg  650 mg Rectal Q6H PRN Gianna Abdul MD        enoxaparin (LOVENOX) injection 30 mg 30 mg SubCUTAneous Q24H Ethan Johnston MD   30 mg at 11/23/21 2000    furosemide (LASIX) injection 40 mg  40 mg IntraVENous BID Ethan Johnston MD   40 mg at 11/24/21 0757    ipratropium-albuterol (DUONEB) nebulizer solution 1 ampule  1 ampule Inhalation Q4H Ethan Johnston MD   1 ampule at 11/24/21 0656    methylPREDNISolone sodium (SOLU-MEDROL) injection 40 mg  40 mg IntraVENous Q8H Ethan Johnston MD   40 mg at 11/24/21 0757    doxycycline (VIBRAMYCIN) 100 mg in dextrose 5 % 100 mL IVPB  100 mg IntraVENous Q12H Ethan Johnston MD   Stopped at 11/24/21 0856    glucose (GLUTOSE) 40 % oral gel 15 g  15 g Oral PRN Ethan Johnston MD        dextrose 50 % IV solution  12.5 g IntraVENous PRN Ethan Johnston MD        glucagon (rDNA) injection 1 mg  1 mg IntraMUSCular PRN Ethan Johnston MD        dextrose 5 % solution  100 mL/hr IntraVENous PRN Ethan Johnston MD        famotidine (PEPCID) injection 20 mg  20 mg IntraVENous Q24H Ethan Johnston MD   20 mg at 11/23/21 1959    thiamine mononitrate tablet 100 mg  100 mg Oral Daily Ethan Johnston MD   100 mg at 11/24/21 0756    multivitamin 1 tablet  1 tablet Oral Daily Ethan Johnston MD   1 tablet at 11/24/21 0757    LORazepam (ATIVAN) tablet 1 mg  1 mg Oral Q1H PRN Ethan Johnston MD        Or    LORazepam (ATIVAN) injection 1 mg  1 mg IntraVENous Q1H PRN Ethan Johnston MD        Or    LORazepam (ATIVAN) tablet 2 mg  2 mg Oral Q1H PRN Ethan Johnston MD        Or    LORazepam (ATIVAN) injection 2 mg  2 mg IntraVENous Q1H PRN Ethan Johnston MD        Or    LORazepam (ATIVAN) tablet 3 mg  3 mg Oral Q1H PRN Ethan Johnston MD        Or    LORazepam (ATIVAN) injection 3 mg  3 mg IntraVENous Q1H PRN Ethan Johnston MD        Or    LORazepam (ATIVAN) tablet 4 mg  4 mg Oral Q1H PRN Ethan Johnston MD        Or    LORazepam (ATIVAN) injection 4 mg  4 mg IntraVENous Q1H PRN Ethan Johnston MD        propofol injection  5-50 mcg/kg/min IntraVENous Titrated Ethan Johnston MD 18.7 mL/hr at 11/24/21 0831 30 mcg/kg/min at 11/24/21 0831         dextrose      propofol 30 mcg/kg/min (11/24/21 0831)        Objective:   BP (!) 106/50   Pulse 61   Temp 98 °F (36.7 °C) (Temporal)   Resp 18   Ht 6' (1.829 m)   Wt 238 lb 1.6 oz (108 kg)   SpO2 96%   BMI 32.29 kg/m²     General: no acute distress  HEENT: normocephalic, atraumatic  Neck: supple, symmetrical, trachea midline   Lungs: bilateral crackles  Cardiovascular: s1 and s2 normal  Abdomen: soft, positive bowel sounds  Extremities: 2+ pitting edema in lower extremities bilaterally, chronic venous stasis changes  Neuro: intubated and sedated    Recent Labs     11/23/21 1627   WBC 14.2*   RBC 4.41*   HGB 12.5*   HCT 41.5*   MCV 94.1*   MCH 28.3   MCHC 30.1*        Recent Labs     11/23/21  1627 11/23/21  1655 11/24/21  0436     --  138   K 4.3 4.2 4.1   ANIONGAP 14  --  16     --  98   CO2 26  --  24   BUN 34*  --  34*   CREATININE 3.1*  --  3.0*   GLUCOSE 292*  --  289*   CALCIUM 9.2  --  8.9     Recent Labs     11/24/21  0436   MG 2.1     Recent Labs     11/23/21  1627 11/24/21  0436   AST 15 10   ALT 14 12   BILITOT 0.8 0.8   ALKPHOS 146* 120     No results for input(s): PH, PO2, PCO2, HCO3, BE, O2SAT in the last 72 hours. Recent Labs     11/23/21  1627 11/23/21  2244 11/24/21 0436   TROPONINI 0.02 0.02 0.02     Recent Labs     11/23/21 1627   INR 1.16     No results for input(s): LACTA in the last 72 hours. Intake/Output Summary (Last 24 hours) at 11/24/2021 0955  Last data filed at 11/24/2021 0856  Gross per 24 hour   Intake 564.17 ml   Output 2900 ml   Net -2335.83 ml       XR CHEST PORTABLE    Result Date: 11/23/2021  EXAMINATION: XR CHEST PORTABLE 11/23/2021 7:05 PM HISTORY: ET tube placement Single view the chest obtained compared prior exam the same date. Bilateral parahilar opacities are present consistent with pulmonary edema. Cardiac silhouettes mildly enlarged.  Endotracheal tube is satisfactorily positioned. Pacing device is present in the soft tissues of the left chest.    Mild cardiomegaly and changes of parahilar interstitial pulmonary edema unchanged in one hour. 2. Endotracheal tube satisfactorily positioned Signed by Dr Maria De Jesus Pascual    XR CHEST PORTABLE    Result Date: 11/23/2021  EXAM: XR CHEST PORTABLE -- 11/23/2021 4:43 PM HISTORY: 81 years, Male, dyspnea COMPARISON: 8/26/2021 TECHNIQUE:  2 images. Frontal view of the chest. FINDINGS:  No pneumothorax. Bilateral perihilar and interstitial opacities. Possible layering left pleural effusion. No large right pleural effusion. Cardiac mediastinal silhouette similar to prior. Calcified aortic atherosclerosis. No acute bony finding. Left chest cardiac pulse generator with 2 grossly intact leads. 1. Findings most suggestive of pulmonary edema.  Signed by Dr Iesha Stover and Plan:   Acute on chronic decompensated diastolic congestive heart failure  Diuresis  Strict I's and O's  Daily weights  Serial troponin 0.02 x3  TTE 2/14/2021 reviewed     COPD exacerbation  Steroids  Nebs  Antibiotics     Ventilator dependent acute hypercapnic and hypoxemic respiratory failure  Suspect secondary to above processes  Titrate minute ventilation for pH 7.35  Follow ABG/CXR  SBT once able     History of atrial fibrillation  Home rate control meds on board  Patient not on AC  Follow electrolytes     Alcohol dependence  Monitor for withdrawal  CIWA protocol  Daily thiamine/folic acid/MVI  Follow electrolytes     CKD 4  Follow renal function/urine output/electrolytes  Avoid offending agents     DM2  Meds on board     DVT prophylaxis  Lovenox    Total critical care time: 52 minutes    Jaja Granado MD   11/24/2021 9:55 AM

## 2021-11-24 NOTE — ED PROVIDER NOTES
[facility]  EMERGENCY DEPARTMENT ENCOUNTER      Pt Name: Josiah Wiggins  MRN: [id number]  [place] 1940  Date of evaluation: 11/23/2021  Provider: Gayle Florez MD    [address] [place] [address]       Chief Complaint   Patient presents with    Shortness of Breath         HISTORY OF PRESENT ILLNESS   (Location/Symptom, Timing/Onset, Context/Setting, Quality, Duration, Modifying Factors, Severity)  Note limiting factors. Josiah Wiggins is a [de-identified] y.o. male who presents to the emergency department        Shortness of Breath  Severity:  Severe  Onset quality:  Gradual  Progression:  Worsening  Chronicity:  Recurrent  Relieved by:  Nothing  Worsened by:  Exertion  Ineffective treatments:  None tried  Associated symptoms: no abdominal pain, no chest pain, no cough, no fever, no syncope and no vomiting        Nursing Notes were reviewed. REVIEW OF SYSTEMS    (2-9 systems for level 4, 10 or more for level 5)     Review of Systems   Constitutional: Negative for fever. Respiratory: Positive for shortness of breath. Negative for cough. Cardiovascular: Negative for chest pain and syncope. Gastrointestinal: Negative for abdominal pain and vomiting. All other systems reviewed and are negative. Except as noted above the remainder of the review of systems was reviewed and negative.        PAST MEDICAL HISTORY     Past Medical History:   Diagnosis Date    Acute liver failure without hepatic coma 10/23/2018    Back pain     \"with tired legs as a result\"    Bladder cancer ([address] 12/19/2018    Blood circulation, collateral     Carotid arterial disease ([address]     recent surgery    CKD (chronic kidney disease), 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/2018    COPD with acute lower respiratory infection ([address] 02/24/2021    Epigastric discomfort     GERD (gastroesophageal reflux disease)     Hyperlipidemia     Hypertension     Hypertension     Palliative care patient 10/23/2018    Pneumonia due to infectious organism 11/06/2018    Primary osteoarthritis of left knee 10/14/2018    PVD (peripheral vascular disease) (HCC)     Tremor     Tremor on Right side x 1-2 weeks per stepdaughter    Type 2 diabetes mellitus with complication, without long-term current use of insulin ([facility] 01/21/2021         SURGICAL HISTORY       Past Surgical History:   Procedure Laterality Date    BACK SURGERY      CARDIAC CATHETERIZATION  03/23/2021    100% RCA    COLONOSCOPY  2007?  CYSTOSCOPY Bilateral 12/19/2018    CYSTOSCOPY, BIOSPY FULGURATION OF BLADDER TUMOR POSSIBLE TURBT, RETROGRADE PYELOGRAM performed by Milton Barbosa MD at [address] COLONOSCOPY FLX DX W/COLLJ SPEC WHEN PFRMD N/A 09/11/2017    Dr Xavier Campbell internal hemorrhoids, diverticular disease-HP-No recall (age)   [employer] REVISE MEDIAN N/CARPAL TUNNEL SURG Left 07/18/2018    OPEN CARPAL TUNNEL RELEASE performed by Augusto Akers MD at [address] Left 08/28/2018    LEFT CAROTID ENDARTERECTOMY WITH VEIN PATCH ANGIOPLASTY AND COMPLETION ANGIOGRAM performed by Idalia Mcfarlane MD at [place] [address] Left 10/15/2018    LEFT COMPLEX TOTAL KNEE ARTHROPLASTY performed by Augusto Akers MD at [facility] ENDOSCOPY N/A 11/18/2021    Dr Riri Hull, Sub prep lg amount of solid and semisolid food/Medication in stomach body/antrum/pylorus, stomach lumen appeared to distended w air insufflation and collapse upon suction,   inadequate exam sugg of gastroparesis, repeat in 10-14 days    VASCULAR SURGERY  04/21/2015    Reagan BESS Ultrasound guided access of left common femoral artery. Aortogram.Diagnostic right lower extremity arteriogram.Radiologic supervision and interpretation.  VASCULAR SURGERY  01/13/2015    Reagan Guzman M.D Atherectomy,angioplasty,and stenting of left superficial femoral artery.  VASCULAR SURGERY  03/11/2014    Reagan Guzman M.D.  Ultrasound-guided access of right common femoral artery. Aortogram.Left lower extremity arteriogram.Atherectomy and angioplasty of left superficial femoral artery. Radiologic supervision and interpretation.  VASCULAR SURGERY  01/18/2013    Michelle BESS Aortogram.Multistation arteriogram right lower extremity. Laser atherectomy and angioplasty of right superficial femoral artery. Selective catheterization of right tibioperoneal trunk. Angioplasty of peroneal artery and tibioperoneal trunk.  VASCULAR SURGERY  10/30/2018    S. Ultrasound guided cannulation of right internal vein. Placement of right internal jugular vein tunneled dialysis catheter bard equistream xk 23cm tip to cuff    VASCULAR SURGERY  12/17/2018    SJS. Removal of tunneled dilaysis catheter right internal jugular vein.          CURRENT MEDICATIONS       Current Discharge Medication List      CONTINUE these medications which have NOT CHANGED    Details   metoclopramide (REGLAN) 5 MG tablet Take 1 tablet by mouth 3 times daily for 14 days  Qty: 42 tablet, Refills: 0      pantoprazole (PROTONIX) 20 MG tablet Take 20 mg by mouth daily      Nutritional Supplements (VITAMIN D BOOSTER PO) Take by mouth      SENNA PLUS 8.6-50 MG per tablet       glipiZIDE (GLUCOTROL XL) 10 MG extended release tablet TAKE 1 TABLET BY MOUTH EVERY DAY      dexlansoprazole (DEXILANT) 60 MG CPDR delayed release capsule Take 60 mg by mouth daily       bumetanide (BUMEX) 1 MG tablet Take 1 mg by mouth daily      amiodarone (CORDARONE) 200 MG tablet TAKE 1 TABLET BY MOUTH DAILY  Qty: 30 tablet, Refills: 3      mirabegron (MYRBETRIQ) 25 MG TB24 Take 1 tablet by mouth daily  Qty: 90 tablet, Refills: 3    Associated Diagnoses: Benign prostatic hyperplasia with lower urinary tract symptoms, symptom details unspecified      trospium (SANCTURA) 20 MG tablet Take 1 tablet by mouth daily  Qty: 90 tablet, Refills: 3    Associated Diagnoses: Benign prostatic hyperplasia with lower urinary tract symptoms, symptom details unspecified      aspirin 81 MG EC tablet Take 1 tablet by mouth daily  Qty: 30 tablet, Refills: 3      atorvastatin (LIPITOR) 20 MG tablet Take 1 tablet by mouth daily  Qty: 30 tablet, Refills: 0      metoprolol succinate (TOPROL XL) 100 MG extended release tablet Take 1 tablet by mouth daily  Qty: 30 tablet, Refills: 3      NIFEdipine (ADALAT CC) 60 MG extended release tablet Take 1 tablet by mouth daily  Qty: 30 tablet, Refills: 3      ferrous sulfate (IRON 325) 325 (65 Fe) MG tablet Take 1 tablet by mouth 2 times daily (with meals)  Qty: 30 tablet, Refills: 3      folic acid (FOLVITE) 1 MG tablet Take 1 tablet by mouth daily  Qty: 30 tablet, Refills: 3      tamsulosin (FLOMAX) 0.4 MG capsule Take 1 capsule by mouth 2 times daily  Qty: 60 capsule, Refills: 3      vitamin D (ERGOCALCIFEROL) 1.25 MG (60556 UT) CAPS capsule Take 1 capsule by mouth once a week  Qty: 5 capsule, Refills: 0      OXYGEN Inhale 2 L into the lungs continuous  Qty: 1 Container, Refills: 5             ALLERGIES     Eliquis [apixaban] and Promethazine hcl    FAMILY HISTORY       Family History   Problem Relation Age of Onset    Colon Cancer Father     Diabetes Brother     Colon Polyps Neg Hx     Liver Cancer Neg Hx     Liver Disease Neg Hx     Esophageal Cancer Neg Hx     Rectal Cancer Neg Hx     Stomach Cancer Neg Hx           SOCIAL HISTORY       Social History     Socioeconomic History    Marital status:      Spouse name: None    Number of children: None    Years of education: None    Highest education level: None   Occupational History    None   Tobacco Use    Smoking status: Former Smoker     Quit date: 6/3/2003     Years since quittin.4    Smokeless tobacco: Never Used   Vaping Use    Vaping Use: Never used   Substance and Sexual Activity    Alcohol use:  Yes     Alcohol/week: 12.0 standard drinks     Types: 12 Glasses of wine per week     Comment: 2 glasses of wine every night    Drug use: No    Sexual activity: Yes     Partners: Female   Other Topics Concern    None   Social History Narrative    None     Social Determinants of Health     Financial Resource Strain:     Difficulty of Paying Living Expenses: Not on file   Food Insecurity:     Worried About Running Out of Food in the Last Year: Not on file    Ankit of Food in the Last Year: Not on file   Transportation Needs:     Lack of Transportation (Medical): Not on file    Lack of Transportation (Non-Medical): Not on file   Physical Activity:     Days of Exercise per Week: Not on file    Minutes of Exercise per Session: Not on file   Stress:     Feeling of Stress : Not on file   Social Connections:     Frequency of Communication with Friends and Family: Not on file    Frequency of Social Gatherings with Friends and Family: Not on file    Attends Caoda[employer] Services: Not on file    Active Member of [address] [place] [address] [employer] or Organizations: Not on file    Attends Club or Organization Meetings: Not on file    Marital Status: Not on file   Intimate Partner Violence:     Fear of Current or Ex-Partner: Not on file    Emotionally Abused: Not on file    Physically Abused: Not on file    Sexually Abused: Not on file   Housing Stability:     Unable to Pay for Housing in the Last Year: Not on file    Number of [place] in the Last Year: Not on file    Unstable Housing in the Last Year: Not on file       SCREENINGS        [place] Coma Scale  Eye Opening: To pain  Best Verbal Response: None  Best Motor Response: Withdraws from pain  [place] Coma Scale Score: 7               PHYSICAL EXAM    (up to 7 for level 4, 8 or more for level 5)     ED Triage Vitals   BP Temp Temp Source Pulse Resp SpO2 Height Weight   11/23/21 1617 11/23/21 1631 11/23/21 2000 11/23/21 1617 11/23/21 1617 11/23/21 1617 11/23/21 2000 11/23/21 2000   (!) 164/79 98 °F (36.7 °C) Temporal 60 26 (!) 85 % 6' (1.829 m) 238 lb 1.6 oz (108 kg)       Physical Exam  Vitals reviewed. Constitutional:       General: He is in acute distress. Appearance: He is obese. He is ill-appearing. HENT:      Head: Normocephalic. Mouth/Throat:      Mouth: Mucous membranes are moist.   Eyes:      Pupils: Pupils are equal, round, and reactive to light. Cardiovascular:      Rate and Rhythm: Normal rate. Pulmonary:      Effort: Tachypnea and respiratory distress present. Breath sounds: No decreased breath sounds. Chest:      Chest wall: No tenderness. Abdominal:      Palpations: Abdomen is soft. Tenderness: There is no abdominal tenderness. Musculoskeletal:      Cervical back: Normal range of motion and neck supple. Right lower leg: Edema present. Left lower leg: Edema present. Skin:     General: Skin is warm. Capillary Refill: Capillary refill takes less than 2 seconds. Neurological:      General: No focal deficit present. Mental Status: He is alert and oriented to person, place, and time. DIAGNOSTIC RESULTS     EKG: All EKG's are interpreted by the Emergency Department Physician who either signs or Co-signs this chart in the absence of a cardiologist.    Rate 68, atrial paced complexes, no acute ischemic changes. RADIOLOGY:   Non-plain film images such as CT, Ultrasound and MRI are read by the radiologist. Plain radiographic images are visualized and preliminarily interpreted by the emergency physician with the below findings:        Interpretation per the Radiologist below, if available at the time of this note:    XR CHEST PORTABLE   Final Result   Mild cardiomegaly and changes of parahilar interstitial   pulmonary edema unchanged in one hour. 2. Endotracheal tube satisfactorily positioned   Signed by Dr Sergio Corral   Final Result   1. Findings most suggestive of pulmonary edema.    Signed by Dr Marianna Ramos            ED BEDSIDE ULTRASOUND:   Performed by ED Physician - none    LABS:  Labs Reviewed   CBC WITH AUTO DIFFERENTIAL - Abnormal; Notable for the following components:       Result Value    WBC 14.2 (*)     RBC 4.41 (*)     Hemoglobin 12.5 (*)     Hematocrit 41.5 (*)     MCV 94.1 (*)     MCHC 30.1 (*)     Neutrophils % 70.7 (*)     Neutrophils Absolute 10.1 (*)     Monocytes Absolute 1.00 (*)     All other components within normal limits   COMPREHENSIVE METABOLIC PANEL W/ REFLEX TO MG FOR LOW K - Abnormal; Notable for the following components:    Glucose 292 (*)     BUN 34 (*)     CREATININE 3.1 (*)     GFR Non- 19 (*)     GFR African American 24 (*)     Alkaline Phosphatase 146 (*)     All other components within normal limits   BRAIN NATRIURETIC PEPTIDE - Abnormal; Notable for the following components:    Pro-BNP 4,138 (*)     All other components within normal limits   PROTIME-INR - Abnormal; Notable for the following components:    Protime 15.0 (*)     All other components within normal limits   BLOOD GAS, ARTERIAL - Abnormal; Notable for the following components:    pH, Arterial 7.060 (*)     pCO2, Arterial 107.0 (*)     pO2, Arterial 73.0 (*)     HCO3, Arterial 30.3 (*)     Base Excess, Arterial -2.6 (*)     Hemoglobin, Art, Extended 12.5 (*)     O2 Sat, Arterial 88.1 (*)     All other components within normal limits    Narrative:     CALL  Lamberto Sandy RN ER, 11/23/2021 16:56, by [place] Officer   URINE RT REFLEX TO CULTURE - Abnormal; Notable for the following components:    Protein,  (*)     All other components within normal limits   MICROSCOPIC URINALYSIS - Abnormal; Notable for the following components:    Bacteria, UA NEGATIVE (*)     Crystals, UA NEG (*)     All other components within normal limits   POCT GLUCOSE - Abnormal; Notable for the following components:    POC Glucose 288 (*)     All other components within normal limits   COVID-19, RAPID   CULTURE, BLOOD 1   CULTURE, BLOOD 2   TROPONIN   APTT   POTASSIUM, WHOLE BLOOD   TROPONIN   LACTATE, SEPSIS   LACTATE, SEPSIS TROPONIN   MAGNESIUM   COMPREHENSIVE METABOLIC PANEL   POCT GLUCOSE   POCT GLUCOSE       All other labs were within normal range or not returned as of this dictation. EMERGENCY DEPARTMENT COURSE and DIFFERENTIAL DIAGNOSIS/MDM:   Vitals:    Vitals:    11/23/21 2100 11/23/21 2200 11/23/21 2236 11/23/21 2239   BP: (!) 140/64 (!) 121/59     Pulse: 60 60 62    Resp: 18 18 18 18   Temp:       TempSrc:       SpO2: 92% 94% 95% 96%   Weight:       Height:               MDM  Number of Diagnoses or Management Options  Acute on chronic congestive heart failure, unspecified heart failure type (HCC)  Acute on chronic respiratory failure with hypoxia and hypercapnia (HCC)  Diagnosis management comments: Patient is an 67-year-old gentleman who presents in respiratory distress. ABG reveals hypercapnic hypoxia. Chest x-ray shows volume overload. Bilateral lower extremity pitting edema. Presentation is most consistent with CHF exacerbation. Due to worsening somnolence, we did intubate the patient. Patient is a full code according to his wife. Patient admitted to the ICU for definitive care. Amount and/or Complexity of Data Reviewed  Clinical lab tests: ordered and reviewed  Tests in the radiology section of CPT®: ordered and reviewed    Critical Care  Total time providing critical care: 30-74 minutes    Patient Progress  Patient progress: improved        REASSESSMENT          CRITICAL CARE TIME   Total Critical Care time was 45 minutes, excluding separately reportable procedures. There was a high probability of clinically significant/life threatening deterioration in the patient's condition which required my urgent intervention.       CONSULTS:  IP CONSULT TO PALLIATIVE CARE    PROCEDURES:  Unless otherwise noted below, none     Intubation    Date/Time: 11/23/2021 11:15 PM  Performed by: Martha Lynn MD  Authorized by: Noni Connors MD     Consent:     Consent obtained:  Emergent situation and verbal    Consent given by:  Spouse    Risks discussed:  Hypoxia  Pre-procedure details:     Patient status:  Altered mental status    Mallampati score:  III    Pretreatment medications:  None    Paralytics:  Succinylcholine  Procedure details:     Preoxygenation:  BiPAP    CPR in progress: no      Intubation method:  Oral    Oral intubation technique:  Video-assisted    Laryngoscope blade: Mac 3    Tube size (mm):  7.0    Tube type:  Cuffed    Number of attempts:  1    Cricoid pressure: no      Tube visualized through cords: yes    Placement assessment:     ETT to lip:  22    Tube secured with:  ETT ibrahim    Breath sounds:  Equal    Placement verification: chest rise, condensation, direct visualization, equal breath sounds, ETCO2 detector and tube exhalation      CXR findings:  ETT in proper place  Post-procedure details:     Patient tolerance of procedure: Tolerated well, no immediate complications            FINAL IMPRESSION      1. Acute on chronic respiratory failure with hypoxia and hypercapnia (HCC)    2. Acute on chronic congestive heart failure, unspecified heart failure type [facility])          DISPOSITION/PLAN   DISPOSITION Admitted 11/23/2021 06:38:03 PM      PATIENT REFERRED TO:  No follow-up provider specified. DISCHARGE MEDICATIONS:  Current Discharge Medication List        Controlled Substances Monitoring:     No flowsheet data found.     (Please note that portions of this note were completed with a voice recognition program.  Efforts were made to edit the dictations but occasionally words are mis-transcribed.)    Vidal Sloan MD (electronically signed)  Attending Emergency Physician            Vidal Sloan MD  11/23/21 9612

## 2021-11-24 NOTE — H&P
Hospitalist History & Physical  [facility]    Patient: Socorro Castañeda   : 1940   MRN: [id number]  Code Status: Full Code  PCP: Serge Anguiano MD  Date of Service: 2021    Chief Complaint:   Respiratory distress    History of Present Illness:   27-year-old male with past medical history as listed below presented to ER with respiratory distress. Work-up in ER revealed acute decompensated heart failure requiring intubation. Upon interview and examination, patient is intubated and on mechanical ventilation. No family at bedside. No further history available. Admitted to ICU. Review of Systems:   Review of systems not obtained due to patient factors. Past Medical History:     Past Medical History:   Diagnosis Date    Acute liver failure without hepatic coma 10/23/2018    Back pain     \"with tired legs as a result\"    Bladder cancer ([address] 2018    Blood circulation, collateral     Carotid arterial disease (HCC)     recent surgery    CKD (chronic kidney disease), 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/2018    COPD with acute lower respiratory infection ([address] 2021    Epigastric discomfort     GERD (gastroesophageal reflux disease)     Hyperlipidemia     Hypertension     Hypertension     Palliative care patient 10/23/2018    Pneumonia due to infectious organism 2018    Primary osteoarthritis of left knee 10/14/2018    PVD (peripheral vascular disease) (HCC)     Tremor     Tremor on Right side x 1-2 weeks per stepdaughter    Type 2 diabetes mellitus with complication, without long-term current use of insulin ([address] 2021         Past Surgical History:     Past Surgical History:   Procedure Laterality Date    BACK SURGERY      CARDIAC CATHETERIZATION  2021    100% RCA    COLONOSCOPY  ?     CYSTOSCOPY Bilateral 2018    CYSTOSCOPY, BIOSPY FULGURATION OF BLADDER TUMOR POSSIBLE TURBT, RETROGRADE PYELOGRAM [facility]ian [facility]  [place] COLONOSCOPY FLX DX W/COLLJ SPEC WHEN PFRMD N/A 09/11/2017    Dr Danielle Henriquez internal hemorrhoids, diverticular disease-HP-No recall (age)   Naveen Hinders [place] REVISE MEDIAN N/CARPAL TUNNEL SURG Left 07/18/2018    OPEN CARPAL TUNNEL RELEASE performed by Julius Porras MD at [address] Left 08/28/2018    LEFT CAROTID ENDARTERECTOMY WITH VEIN PATCH ANGIOPLASTY AND COMPLETION ANGIOGRAM [facility] Left 10/15/2018    LEFT COMPLEX TOTAL KNEE ARTHROPLASTY performed by Julius Porras MD at [address] [facility] ENDOSCOPY N/A 11/18/2021    Dr Nuvia Estradae, Sub prep lg amount of solid and semisolid food/Medication in stomach body/antrum/pylorus, stomach lumen appeared to distended w air insufflation and collapse upon suction,   inadequate exam sugg of gastroparesis, repeat in 10-14 days    VASCULAR SURGERY  04/21/2015    Karen BESS Ultrasound guided access of left common femoral artery. Aortogram.Diagnostic right lower extremity arteriogram.Radiologic supervision and interpretation.  VASCULAR SURGERY  01/13/2015    Karen Peres M.D Atherectomy,angioplasty,and stenting of left superficial femoral artery.  VASCULAR SURGERY  03/11/2014    Karen Peres M.D. Ultrasound-guided access of right common femoral artery. Aortogram.Left lower extremity arteriogram.Atherectomy and angioplasty of left superficial femoral artery. Radiologic supervision and interpretation.  VASCULAR SURGERY  01/18/2013    Karen BESS Aortogram.Multistation arteriogram right lower extremity. Laser atherectomy and angioplasty of right superficial femoral artery. Selective catheterization of right tibioperoneal trunk. Angioplasty of peroneal artery and tibioperoneal trunk.  VASCULAR SURGERY  10/30/2018    [facility]. Ultrasound guided cannulation of right internal vein. Placement of right internal jugular vein tunneled dialysis catheter bard tian xk 23cm tip to cuff    VASCULAR SURGERY  2018    [facility]JS. Removal of tunneled dilaysis catheter right internal jugular vein. Family History:     Family History   Problem Relation Age of Onset    Colon Cancer Father     Diabetes Brother     Colon Polyps Neg Hx     Liver Cancer Neg Hx     Liver Disease Neg Hx     Esophageal Cancer Neg Hx     Rectal Cancer Neg Hx     Stomach Cancer Neg Hx         Social History:     Social History     Socioeconomic History    Marital status:      Spouse name: None    Number of children: None    Years of education: None    Highest education level: None   Occupational History    None   Tobacco Use    Smoking status: Former Smoker     Quit date: 6/3/2003     Years since quittin.4    Smokeless tobacco: Never Used   Vaping Use    Vaping Use: Never used   Substance and Sexual Activity    Alcohol use: Yes     Alcohol/week: 12.0 standard drinks     Types: 12 Glasses of wine per week     Comment: 2 glasses of wine every night    Drug use: No    Sexual activity: Yes     Partners: Female   Other Topics Concern    None   Social History Narrative    None     Social Determinants of Health     Financial Resource Strain:     Difficulty of Paying Living Expenses: Not on file   Food Insecurity:     Worried About Running Out of Food in the Last Year: Not on file    Ankit of Food in the Last Year: Not on file   Transportation Needs:     Lack of Transportation (Medical): Not on file    Lack of Transportation (Non-Medical):  Not on file   Physical Activity:     Days of Exercise per Week: Not on file    Minutes of Exercise per Session: Not on file   Stress:     Feeling of Stress : Not on file   Social Connections:     Frequency of Communication with Friends and Family: Not on file    Frequency of Social Gatherings with Friends and Family: Not on file    Attends Jain Services: Not on file   [facility] Active Member of Clubs or Organizations: Not on file    Attends Club or Organization Meetings: Not on file    Marital Status: Not on file   Intimate Partner Violence:     Fear of Current or Ex-Partner: Not on file    Emotionally Abused: Not on file    Physically Abused: Not on file    Sexually Abused: Not on file   Housing Stability:     Unable to Pay for Housing in the Last Year: Not on file    Number of [place] in the Last Year: Not on file    Unstable Housing in the Last Year: Not on file       Prior to Admission Medications:   Medications Prior to Admission: metoclopramide (REGLAN) 5 MG tablet, Take 1 tablet by mouth 3 times daily for 14 days  pantoprazole (PROTONIX) 20 MG tablet, Take 20 mg by mouth daily  Nutritional Supplements (VITAMIN D BOOSTER PO), Take by mouth  SENNA PLUS 8.6-50 MG per tablet,   glipiZIDE (GLUCOTROL XL) 10 MG extended release tablet, TAKE 1 TABLET BY MOUTH EVERY DAY  dexlansoprazole (DEXILANT) 60 MG CPDR delayed release capsule, Take 60 mg by mouth daily   bumetanide (BUMEX) 1 MG tablet, Take 1 mg by mouth daily  amiodarone (CORDARONE) 200 MG tablet, TAKE 1 TABLET BY MOUTH DAILY  mirabegron (MYRBETRIQ) 25 MG TB24, Take 1 tablet by mouth daily  trospium (SANCTURA) 20 MG tablet, Take 1 tablet by mouth daily  aspirin 81 MG EC tablet, Take 1 tablet by mouth daily  atorvastatin (LIPITOR) 20 MG tablet, Take 1 tablet by mouth daily  metoprolol succinate (TOPROL XL) 100 MG extended release tablet, Take 1 tablet by mouth daily  NIFEdipine (ADALAT CC) 60 MG extended release tablet, Take 1 tablet by mouth daily  ferrous sulfate (IRON 325) 325 (65 Fe) MG tablet, Take 1 tablet by mouth 2 times daily (with meals)  folic acid (FOLVITE) 1 MG tablet, Take 1 tablet by mouth daily  tamsulosin (FLOMAX) 0.4 MG capsule, Take 1 capsule by mouth 2 times daily  vitamin D (ERGOCALCIFEROL) 1.25 MG (08587 UT) CAPS capsule, Take 1 capsule by mouth once a week  OXYGEN, Inhale 2 L into the lungs continuous     Allergies: Allergies   Allergen Reactions    Eliquis [Apixaban] Other (See Comments)     \"almost bled to death\"    Promethazine Hcl Other (See Comments)     He became encephalopathic for several hours and was not responsive.          Physical Exam:   /60   Pulse 60   Temp 98 °F (36.7 °C)   Resp 18   SpO2 97%     General: no acute distress  HEENT: normocephalic, atraumatic  Neck: supple, symmetrical, trachea midline   Lungs: bilateral crackles  Cardiovascular: s1 and s2 normal  Abdomen: soft, positive bowel sounds  Extremities: 2+ pitting edema in lower extremities bilaterally, chronic venous stasis changes  Neuro: intubated and sedated    Recent Results (from the past 72 hour(s))   CBC Auto Differential    Collection Time: 11/23/21  4:27 PM   Result Value Ref Range    WBC 14.2 (H) 4.8 - 10.8 K/uL    RBC 4.41 (L) 4.70 - 6.10 M/uL    Hemoglobin 12.5 (L) 14.0 - 18.0 g/dL    Hematocrit 41.5 (L) 42.0 - 52.0 %    MCV 94.1 (H) 80.0 - 94.0 fL    MCH 28.3 27.0 - 31.0 pg    MCHC 30.1 (L) 33.0 - 37.0 g/dL    RDW 13.6 11.5 - 14.5 %    Platelets 958 470 - 308 K/uL    MPV 11.5 9.4 - 12.4 fL    Neutrophils % 70.7 (H) 50.0 - 65.0 %    Lymphocytes % 20.9 20.0 - 40.0 %    Monocytes % 6.9 0.0 - 10.0 %    Eosinophils % 0.0 0.0 - 5.0 %    Basophils % 0.6 0.0 - 1.0 %    Neutrophils Absolute 10.1 (H) 1.5 - 7.5 K/uL    Immature Granulocytes # 0.1 K/uL    Lymphocytes Absolute 3.0 1.1 - 4.5 K/uL    Monocytes Absolute 1.00 (H) 0.00 - 0.90 K/uL    Eosinophils Absolute 0.00 0.00 - 0.60 K/uL    Basophils Absolute 0.10 0.00 - 0.20 K/uL   Comprehensive Metabolic Panel w/ Reflex to MG    Collection Time: 11/23/21  4:27 PM   Result Value Ref Range    Sodium 141 136 - 145 mmol/L    Potassium reflex Magnesium 4.3 3.5 - 5.0 mmol/L    Chloride 101 98 - 111 mmol/L    CO2 26 22 - 29 mmol/L    Anion Gap 14 7 - 19 mmol/L    Glucose 292 (H) 74 - 109 mg/dL    BUN 34 (H) 8 - 23 mg/dL    CREATININE 3.1 (H) 0.5 - 1.2 mg/dL    GFR Non- 19 (A) >60    GFR  24 (L) >59    Calcium 9.2 8.8 - 10.2 mg/dL    Total Protein 7.5 6.6 - 8.7 g/dL    Albumin 4.6 3.5 - 5.2 g/dL    Total Bilirubin 0.8 0.2 - 1.2 mg/dL    Alkaline Phosphatase 146 (H) 40 - 130 U/L    ALT 14 5 - 41 U/L    AST 15 5 - 40 U/L   Troponin    Collection Time: 11/23/21  4:27 PM   Result Value Ref Range    Troponin 0.02 0.00 - 0.03 ng/mL   Brain Natriuretic Peptide    Collection Time: 11/23/21  4:27 PM   Result Value Ref Range    Pro-BNP 4,138 (H) 0 - 1,800 pg/mL   Protime-INR    Collection Time: 11/23/21  4:27 PM   Result Value Ref Range    Protime 15.0 (H) 12.0 - 14.6 sec    INR 1.16 0.88 - 1.18   APTT    Collection Time: 11/23/21  4:27 PM   Result Value Ref Range    aPTT 31.0 26.0 - 36.2 sec   SARS-CoV-2 NAAT (Rapid)    Collection Time: 11/23/21  4:40 PM    Specimen: Nasopharyngeal Swab   Result Value Ref Range    SARS-CoV-2, NAAT Not Detected Not Detected   Blood Gas, Arterial    Collection Time: 11/23/21  4:55 PM   Result Value Ref Range    pH, Arterial 7.060 (LL) 7.350 - 7.450    pCO2, Arterial 107.0 (HH) 35.0 - 45.0 mmHg    pO2, Arterial 73.0 (L) 80.0 - 100.0 mmHg    HCO3, Arterial 30.3 (H) 22.0 - 26.0 mmol/L    Base Excess, Arterial -2.6 (L) -2.0 - 2.0 mmol/L    Hemoglobin, Art, Extended 12.5 (L) 14.0 - 18.0 g/dL    O2 Sat, Arterial 88.1 (L) >92 %    Carboxyhgb, Arterial 0.9 0.0 - 5.0 %    Methemoglobin, Arterial 0.0 <1.5 %    O2 Content, Arterial 15.5 Not Established mL/dL    O2 Therapy Unknown    Potassium, Whole Blood    Collection Time: 11/23/21  4:55 PM   Result Value Ref Range    Potassium, Whole Blood 4.2    Urinalysis Reflex to Culture    Collection Time: 11/23/21  5:34 PM    Specimen: Urine, clean catch   Result Value Ref Range    Color, UA YELLOW Straw/Yellow    Clarity, UA Clear Clear    Glucose, Ur Negative Negative mg/dL    Bilirubin Urine Negative Negative    Ketones, Urine Negative Negative mg/dL    Specific Gravity, UA 1.017 1.005 - 1. 030    Blood, Urine Negative Negative    pH, UA 5.0 5.0 - 8.0    Protein,  (A) Negative mg/dL    Urobilinogen, Urine 1.0 <2.0 E.U./dL    Nitrite, Urine Negative Negative    Leukocyte Esterase, Urine Negative Negative   Microscopic Urinalysis    Collection Time: 11/23/21  5:34 PM   Result Value Ref Range    Bacteria, UA NEGATIVE (A) None Seen /HPF    Crystals, UA NEG (A) None Seen /HPF    Hyaline Casts, UA 5 0 - 8 /HPF    WBC, UA 1 0 - 5 /HPF    RBC, UA 1 0 - 4 /HPF    Epithelial Cells, UA 1 0 - 5 /HPF       No intake/output data recorded. XR CHEST PORTABLE    Result Date: 11/23/2021  Mild cardiomegaly and changes of parahilar interstitial pulmonary edema unchanged in one hour. 2. Endotracheal tube satisfactorily positioned Signed by Dr Columba Andrews    XR CHEST PORTABLE    Result Date: 11/23/2021  1. Findings most suggestive of pulmonary edema.  Signed by Dr Melanie Tirado and Plan:   Acute on chronic decompensated diastolic congestive heart failure  Diuresis  Strict I's and O's  Daily weights  Serial troponin  TTE 2/14/2021 reviewed    COPD exacerbation  Steroids  Nebs  Antibiotics    Ventilator dependent acute hypercapnic and hypoxemic respiratory failure  Suspect secondary to above processes  Titrate minute ventilation for pH 7.35  Follow ABG/CXR  SBT once able    History of atrial fibrillation  Home rate control meds on board  Patient not on AC  Follow electrolytes    Alcohol dependence  Monitor for withdrawal  CIWA protocol  Daily thiamine/folic acid/MVI  Follow electrolytes    CKD 4  Follow renal function/urine output/electrolytes  Avoid offending agents    DM2  Meds on board    DVT prophylaxis  Lovenox    Total critical care time: 70 minutes  Total time spent managing the care of this patient: 70 minutes    Olesya Mann MD  11/23/2021 7:41 PM

## 2021-11-24 NOTE — CONSULTS
**Physician Signature**  This document was electronically signed by: Marta Lu DO  2021 11:35   AM    **Consult Information**  Member Facility: [address] [place] [address] Drive MRN: [id number]  Visit/Encounter Number: [id number]  Consult ID: [id number]  Facility Time Zone: CT  Date and Time of Request: 2021 05:38 AM  CT  Requesting Clinician: Krista Maurer MD  Patient Name: Nicolás Gonzalez  Date of Birth:   Gender: Male  Patient identity was confirmed at the beginning of the consult with the   patient/family/staff using two personal identifiers: Patient name and       **Reason for Consult**  Reason for Consult: [facility]    **Admission**  Admission Date: 2021  Chief reason for ICU admission: Respiratory Failure  Secondary reasons for ICU admission: COPD Exacerbation, CHF    **Core Metrics**  General orienting sentence for patient: [de-identified] yo male w PPM, CHF, COPD admitted   with dyspnea and PCO2 107, tried NIV, had sig rhonchi, needed ultimately   intubated, good u/o to Lasix, no pressors, Fio2 55%, PEEP 5 today. Great   diuresis with Lasix. Vital signs are stable.   Chief physiologic deterioration: Increase in FiO2 required by   30%  Is the patient on DVT prophylaxis?: Yes  Prophylaxis type: Pharmacological  DVT Prophylaxis Comments: enoxaparin  Is the patient on GI prophylaxis?: Yes  Gi Prophylaxis Comments: PPI  Has this patient reached their nutritional goal?: No and issues are being   addressed  Are there current issues with pain management in this patient?:   No  Are there issues with skin integrity?: No  Are there issues with delirium?: No  Has the patient been mobilized?: No  Is this patient currently intubated?: Yes  Has facility initiated vent bundle?: Yes  Are there ethical or care philosophy or family issues?: No  Do you recommend an in depth evaluation?: No  Disposition Recommendations: Continue ICU level of care    **Inserted/Removed Devices**  Device Name: Endotracheal Tube  Site of insertion: Trachea  Date of insertion: 11-  Device Name: Orogastric Tube  Site of insertion: Mouth  Date of insertion: 11-  Device Name: Shultz Catheter  Site of insertion: Bladder  Date of insertion: 11-    **Physician Signature**  This document was electronically signed by: Tao Grant DO  11/24/2021 11:35   AM

## 2021-11-24 NOTE — PROGRESS NOTES
Results for Regino Chaves (MRN [id number]) as of 11/24/2021 11:07   Ref.  Range 11/24/2021 11:05   Hemoglobin, Art, Extended Latest Ref Range: 14.0 - 18.0 g/dL 11.4 (L)   pH, Arterial Latest Ref Range: 7.350 - 7.450  7.450   pCO2, Arterial Latest Ref Range: 35.0 - 45.0 mmHg 40.0   pO2, Arterial Latest Ref Range: 80.0 - 100.0 mmHg 72.0 (L)   HCO3, Arterial Latest Ref Range: 22.0 - 26.0 mmol/L 27.8 (H)   Base Excess, Arterial Latest Ref Range: -2.0 - 2.0 mmol/L 3.5 (H)   O2 Sat, Arterial Latest Ref Range: >92 % 92.9   O2 Content, Arterial Latest Ref Range: Not Established mL/dL 14.9   Methemoglobin, Arterial Latest Ref Range: <1.5 % 0.0   Carboxyhgb, Arterial Latest Ref Range: 0.0 - 5.0 % 0.4   CPAP +5 5PSV 35%  ABG at RR, +AT

## 2021-11-24 NOTE — ED NOTES
Spoke with Pt's wife and updated her on pt's condition. Pt's step daughter Everardo Sellers called the ER to request update. Notes her mother has some dementia and wants to be updated to make sure she is able to help her mother.       Terrence Denney RN  11/23/21 [id number]

## 2021-11-25 LAB
ALBUMIN SERPL-MCNC: 3.5 G/DL (ref 3.5–5.2)
ALP BLD-CCNC: 101 U/L (ref 40–130)
ALT SERPL-CCNC: 11 U/L (ref 5–41)
ANION GAP SERPL CALCULATED.3IONS-SCNC: 16 MMOL/L (ref 7–19)
AST SERPL-CCNC: 10 U/L (ref 5–40)
BASOPHILS ABSOLUTE: 0 K/UL (ref 0–0.2)
BASOPHILS RELATIVE PERCENT: 0.1 % (ref 0–1)
BILIRUB SERPL-MCNC: 0.7 MG/DL (ref 0.2–1.2)
BUN BLDV-MCNC: 41 MG/DL (ref 8–23)
CALCIUM SERPL-MCNC: 8.6 MG/DL (ref 8.8–10.2)
CHLORIDE BLD-SCNC: 97 MMOL/L (ref 98–111)
CO2: 23 MMOL/L (ref 22–29)
CREAT SERPL-MCNC: 3.3 MG/DL (ref 0.5–1.2)
EOSINOPHILS ABSOLUTE: 0 K/UL (ref 0–0.6)
EOSINOPHILS RELATIVE PERCENT: 0 % (ref 0–5)
GFR AFRICAN AMERICAN: 22
GFR NON-AFRICAN AMERICAN: 18
GLUCOSE BLD-MCNC: 187 MG/DL (ref 74–109)
GLUCOSE BLD-MCNC: 196 MG/DL (ref 70–99)
GLUCOSE BLD-MCNC: 279 MG/DL (ref 70–99)
GLUCOSE BLD-MCNC: 284 MG/DL (ref 70–99)
GLUCOSE BLD-MCNC: 295 MG/DL (ref 70–99)
HCT VFR BLD CALC: 33.2 % (ref 42–52)
HEMOGLOBIN: 10.1 G/DL (ref 14–18)
IMMATURE GRANULOCYTES #: 0.1 K/UL
LYMPHOCYTES ABSOLUTE: 0.6 K/UL (ref 1.1–4.5)
LYMPHOCYTES RELATIVE PERCENT: 4.8 % (ref 20–40)
MAGNESIUM: 1.9 MG/DL (ref 1.6–2.4)
MCH RBC QN AUTO: 28.9 PG (ref 27–31)
MCHC RBC AUTO-ENTMCNC: 30.4 G/DL (ref 33–37)
MCV RBC AUTO: 95.1 FL (ref 80–94)
MONOCYTES ABSOLUTE: 0.3 K/UL (ref 0–0.9)
MONOCYTES RELATIVE PERCENT: 2.8 % (ref 0–10)
NEUTROPHILS ABSOLUTE: 10.6 K/UL (ref 1.5–7.5)
NEUTROPHILS RELATIVE PERCENT: 91.1 % (ref 50–65)
PDW BLD-RTO: 13.4 % (ref 11.5–14.5)
PERFORMED ON: ABNORMAL
PLATELET # BLD: 135 K/UL (ref 130–400)
PMV BLD AUTO: 11.3 FL (ref 9.4–12.4)
POTASSIUM SERPL-SCNC: 4 MMOL/L (ref 3.5–5)
RBC # BLD: 3.49 M/UL (ref 4.7–6.1)
SODIUM BLD-SCNC: 136 MMOL/L (ref 136–145)
TOTAL PROTEIN: 5.8 G/DL (ref 6.6–8.7)
WBC # BLD: 11.6 K/UL (ref 4.8–10.8)

## 2021-11-25 PROCEDURE — 1210000000 HC MED SURG R&B

## 2021-11-25 PROCEDURE — 83735 ASSAY OF MAGNESIUM: CPT

## 2021-11-25 PROCEDURE — 6370000000 HC RX 637 (ALT 250 FOR IP): Performed by: INTERNAL MEDICINE

## 2021-11-25 PROCEDURE — 85025 COMPLETE CBC W/AUTO DIFF WBC: CPT

## 2021-11-25 PROCEDURE — 2580000003 HC RX 258: Performed by: INTERNAL MEDICINE

## 2021-11-25 PROCEDURE — 36415 COLL VENOUS BLD VENIPUNCTURE: CPT

## 2021-11-25 PROCEDURE — 6370000000 HC RX 637 (ALT 250 FOR IP): Performed by: HOSPITALIST

## 2021-11-25 PROCEDURE — 82947 ASSAY GLUCOSE BLOOD QUANT: CPT

## 2021-11-25 PROCEDURE — 94640 AIRWAY INHALATION TREATMENT: CPT

## 2021-11-25 PROCEDURE — 2700000000 HC OXYGEN THERAPY PER DAY

## 2021-11-25 PROCEDURE — 2500000003 HC RX 250 WO HCPCS: Performed by: INTERNAL MEDICINE

## 2021-11-25 PROCEDURE — 80053 COMPREHEN METABOLIC PANEL: CPT

## 2021-11-25 PROCEDURE — 6360000002 HC RX W HCPCS: Performed by: INTERNAL MEDICINE

## 2021-11-25 PROCEDURE — 99221 1ST HOSP IP/OBS SF/LOW 40: CPT | Performed by: INTERNAL MEDICINE

## 2021-11-25 RX ORDER — LACTULOSE 10 G/15ML
20 SOLUTION ORAL ONCE
Status: COMPLETED | OUTPATIENT
Start: 2021-11-25 | End: 2021-11-25

## 2021-11-25 RX ADMIN — IPRATROPIUM BROMIDE AND ALBUTEROL SULFATE 1 AMPULE: .5; 2.5 SOLUTION RESPIRATORY (INHALATION) at 10:27

## 2021-11-25 RX ADMIN — METOCLOPRAMIDE 5 MG: 10 TABLET ORAL at 20:54

## 2021-11-25 RX ADMIN — ASPIRIN 81 MG: 81 TABLET, COATED ORAL at 08:50

## 2021-11-25 RX ADMIN — INSULIN LISPRO 3 UNITS: 100 INJECTION, SOLUTION INTRAVENOUS; SUBCUTANEOUS at 17:43

## 2021-11-25 RX ADMIN — DOXYCYCLINE 100 MG: 100 INJECTION, POWDER, LYOPHILIZED, FOR SOLUTION INTRAVENOUS at 08:50

## 2021-11-25 RX ADMIN — IPRATROPIUM BROMIDE AND ALBUTEROL SULFATE 1 AMPULE: .5; 2.5 SOLUTION RESPIRATORY (INHALATION) at 19:24

## 2021-11-25 RX ADMIN — METHYLPREDNISOLONE SODIUM SUCCINATE 40 MG: 40 INJECTION, POWDER, FOR SOLUTION INTRAMUSCULAR; INTRAVENOUS at 17:42

## 2021-11-25 RX ADMIN — TAMSULOSIN HYDROCHLORIDE 0.4 MG: 0.4 CAPSULE ORAL at 20:54

## 2021-11-25 RX ADMIN — ATORVASTATIN CALCIUM 20 MG: 40 TABLET, FILM COATED ORAL at 08:50

## 2021-11-25 RX ADMIN — TROSPIUM CHLORIDE 20 MG: 20 TABLET, FILM COATED ORAL at 08:50

## 2021-11-25 RX ADMIN — METHYLPREDNISOLONE SODIUM SUCCINATE 40 MG: 40 INJECTION, POWDER, FOR SOLUTION INTRAMUSCULAR; INTRAVENOUS at 23:17

## 2021-11-25 RX ADMIN — AMIODARONE HYDROCHLORIDE 200 MG: 200 TABLET ORAL at 08:50

## 2021-11-25 RX ADMIN — DOXYCYCLINE 100 MG: 100 INJECTION, POWDER, LYOPHILIZED, FOR SOLUTION INTRAVENOUS at 20:54

## 2021-11-25 RX ADMIN — METOPROLOL SUCCINATE 100 MG: 50 TABLET, EXTENDED RELEASE ORAL at 08:50

## 2021-11-25 RX ADMIN — FOLIC ACID 1 MG: 1 TABLET ORAL at 08:50

## 2021-11-25 RX ADMIN — TAMSULOSIN HYDROCHLORIDE 0.4 MG: 0.4 CAPSULE ORAL at 08:50

## 2021-11-25 RX ADMIN — NIFEDIPINE 60 MG: 60 TABLET, EXTENDED RELEASE ORAL at 08:50

## 2021-11-25 RX ADMIN — FERROUS SULFATE TAB 325 MG (65 MG ELEMENTAL FE) 325 MG: 325 (65 FE) TAB at 20:55

## 2021-11-25 RX ADMIN — IPRATROPIUM BROMIDE AND ALBUTEROL SULFATE 1 AMPULE: .5; 2.5 SOLUTION RESPIRATORY (INHALATION) at 07:30

## 2021-11-25 RX ADMIN — THIAMINE HCL TAB 100 MG 100 MG: 100 TAB at 08:50

## 2021-11-25 RX ADMIN — METHYLPREDNISOLONE SODIUM SUCCINATE 40 MG: 40 INJECTION, POWDER, FOR SOLUTION INTRAMUSCULAR; INTRAVENOUS at 08:50

## 2021-11-25 RX ADMIN — INSULIN LISPRO 3 UNITS: 100 INJECTION, SOLUTION INTRAVENOUS; SUBCUTANEOUS at 13:10

## 2021-11-25 RX ADMIN — FAMOTIDINE 20 MG: 10 INJECTION, SOLUTION INTRAVENOUS at 23:18

## 2021-11-25 RX ADMIN — ENOXAPARIN SODIUM 30 MG: 30 INJECTION SUBCUTANEOUS at 20:54

## 2021-11-25 RX ADMIN — IPRATROPIUM BROMIDE AND ALBUTEROL SULFATE 1 AMPULE: .5; 2.5 SOLUTION RESPIRATORY (INHALATION) at 22:44

## 2021-11-25 RX ADMIN — FUROSEMIDE 40 MG: 10 INJECTION, SOLUTION INTRAMUSCULAR; INTRAVENOUS at 23:17

## 2021-11-25 RX ADMIN — INSULIN LISPRO 2 UNITS: 100 INJECTION, SOLUTION INTRAVENOUS; SUBCUTANEOUS at 21:08

## 2021-11-25 RX ADMIN — FERROUS SULFATE TAB 325 MG (65 MG ELEMENTAL FE) 325 MG: 325 (65 FE) TAB at 08:50

## 2021-11-25 RX ADMIN — THERA TABS 1 TABLET: TAB at 08:50

## 2021-11-25 RX ADMIN — METOCLOPRAMIDE 5 MG: 10 TABLET ORAL at 17:43

## 2021-11-25 RX ADMIN — IPRATROPIUM BROMIDE AND ALBUTEROL SULFATE 1 AMPULE: .5; 2.5 SOLUTION RESPIRATORY (INHALATION) at 14:27

## 2021-11-25 RX ADMIN — LACTULOSE 20 G: 20 SOLUTION ORAL at 17:43

## 2021-11-25 RX ADMIN — METOCLOPRAMIDE 5 MG: 10 TABLET ORAL at 08:50

## 2021-11-25 ASSESSMENT — PAIN SCALES - GENERAL: PAINLEVEL_OUTOF10: 0

## 2021-11-25 NOTE — PROGRESS NOTES
Consults called at 11:56 am. Dr Heavenly Snowden and Dr Raffy Pierce to see patient. Added to treatment team list at this time.      Electronically signed by John Romero RN on 11/25/2021 at 11:59 AM

## 2021-11-25 NOTE — CONSULTS
Renal Consult Note    Thank you to requesting provider: Dr Skye Singh, for asking us to see Sudhir Porter    Reason for consultation: CKD    Chief Complaint:   Dyspnea. History of Presenting Illness      Patient is an [de-identified] yo pleasant man who has a history of hypertension, type 2 diabetes, congestive heart failure, osteoarthritis of the knee. He is status post left total knee replacement about 3 years ago. His course was complicated with multiorgan failure including acute kidney injury and need for dialysis. Back then patient has received a total of 3-1/2-month of dialysis and has since improved his kidney function. He continues to have chronic kidney disease now stage IV with baseline GFR around 18. He follows with Dr. Rose Wyatt at the renal clinic. Patient is currently admitted with congestive heart failure exacerbation. He required endotracheal intubation mechanical ventilation. He was extubated on November 24. Renal service was consulted to manage his chronic kidney disease. Serum creatinine is 3.3 with GFR of 18. Patient denies any dysuria. He denies NSAIDs abuse. He continues to be little dyspneic but has improved overall.     Past Medical/Surgical History      Active Ambulatory Problems     Diagnosis Date Noted    Diverticulitis of large intestine with perforation without bleeding 06/04/2017    Generalized abdominal pain     Colonic diverticular abscess 08/01/2017    Bilateral carotid artery stenosis 06/25/2018    Atherosclerosis of native artery of both lower extremities with intermittent claudication ([address] 06/25/2018    Carotid stenosis, asymptomatic, left 08/28/2018    Primary osteoarthritis of left knee 10/14/2018    Arthritis of knee 10/15/2018    Essential hypertension 10/15/2018    Pure hypercholesterolemia 10/15/2018    Slow transit constipation 10/15/2018    Iron deficiency anemia 10/15/2018    GERD (gastroesophageal reflux disease) 10/15/2018    Acute liver failure without hepatic coma 10/23/2018    CHF (congestive heart failure) ([address] 10/23/2018    Bilateral pleural effusion 10/23/2018    Palliative care patient 10/23/2018    Shock liver     Acute renal failure (HCC)     BPH associated with nocturia     Bleeding diathesis ([address] 11/05/2018    Epistaxis 11/06/2018    Acute blood loss anemia 11/06/2018    Melena 11/06/2018    Thrombocytopenia ([address] 11/06/2018    Hypocalcemia 11/06/2018    Pneumonia due to infectious organism 11/06/2018    Bladder cancer ([address] 12/19/2018    Postoperative pain 12/19/2018    History of bladder cancer 06/15/2020    Severe sepsis ([address] 01/18/2021    Chronic respiratory failure with hypoxia and hypercapnia ([address] 01/18/2021    Acute on chronic kidney failure ([address] 01/18/2021    Hypoxemia 32/69/9748    Metabolic acidosis 99/15/7925    Acidosis, metabolic, with respiratory acidosis 01/18/2021    Acute respiratory failure ([address] 01/21/2021    Type 2 diabetes mellitus with complication, without long-term current use of insulin ([address] 01/21/2021    Weakness 01/21/2021    Other dysphagia 01/21/2021    Chronic midline low back pain without sciatica 01/22/2021    COPD with acute lower respiratory infection ([address] 02/24/2021    Chronic kidney disease 02/24/2021    Recurrent pneumonia 03/06/2021    Nonsustained ventricular tachycardia (HCC)     Atrial fibrillation ([address] 03/16/2021    Vitamin D deficiency 03/16/2021    Anemia 03/16/2021    Alcohol use 03/16/2021    Pacemaker 03/16/2021    Class 1 obesity in adult 03/25/2021    GREGORIO treated with BiPAP 04/13/2021    Chronic diastolic congestive heart failure ([address] 04/13/2021    SOB (shortness of breath) 04/13/2021    Hypoglycemia 08/25/2021    Acute kidney injury superimposed on CKD ([address] 08/25/2021    Constipation 08/25/2021     Resolved Ambulatory Problems     Diagnosis Date Noted    CKD (chronic kidney disease), 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/2018    Septic shock ([facility] 10/23/2018    Sepsis ([facility] 10/23/2018    Gross hematuria 10/23/2018    Acute respiratory failure with hypoxia ([facility]) 10/23/2018    Elevated troponin 10/23/2018    Coagulopathy ([facility]) 10/23/2018    Hyperkalemia 10/23/2018    Hypochloremia 84/18/2497    Metabolic acidosis 64/43/9890    Cardiogenic shock ([facility])     UTI (urinary tract infection) 01/18/2021    Sepsis ([facility] 02/12/2021     Past Medical History:   Diagnosis Date    Back pain     Blood circulation, collateral     Carotid arterial disease ([facility]     Epigastric discomfort     Hyperlipidemia     Hypertension     Hypertension     PVD (peripheral vascular disease) ([facility])     Tremor          Review of Systems     Constitutional:  No weight loss, no fever/chills  Eyes:  No eye pain, no eye redness  Cardiovascular:  No chest pain, no worsening of edema  Respiratory:  No hemoptysis, no stidor  Gastrointestinal:  No blood in stool, no n/v, no diarrhea  Genitoruinary:  No hematuria, no difficulty with urination  Musculoskeletal:  No joint swelling, no redness  Integumentary:  No Rash, no itching  Neurological:  No focal weakness, No new sensory deficit  Psychiatric:  No depression, no confusion  Endocrine:  No polyuria, no polydipsia       Medications        Current Facility-Administered Medications:     lactulose (CHRONULAC) 10 GM/15ML solution 20 g, 20 g, Oral, Once, Rubi Sheriff MD    amiodarone (CORDARONE) tablet 200 mg, 200 mg, Oral, Daily, Betina Benjamin MD, 200 mg at 11/25/21 0850    aspirin EC tablet 81 mg, 81 mg, Oral, Daily, Betina Benjamin MD, 81 mg at 11/25/21 0850    atorvastatin (LIPITOR) tablet 20 mg, 20 mg, Oral, Daily, Betina Benjamin MD, 20 mg at 11/25/21 0850    [Held by provider] bumetanide (BUMEX) tablet 1 mg, 1 mg, Oral, Daily, Betina Benjamin MD    ferrous sulfate (IRON 325) tablet 325 mg, 325 mg, Oral, BID WC, Betina Benjamin MD, 325 mg at 06/32/84 2642    folic acid (FOLVITE) tablet 1 mg, 1 mg, Oral, Daily, Betina Benjamin MD, 1 mg at 11/25/21 0850    metoclopramide (REGLAN) tablet 5 mg, 5 mg, Oral, TID, Jaiden Ni MD, 5 mg at 11/25/21 0850    metoprolol succinate (TOPROL XL) extended release tablet 100 mg, 100 mg, Oral, Daily, Jaiden Ni MD, 100 mg at 11/25/21 0850    NIFEdipine (PROCARDIA XL) extended release tablet 60 mg, 60 mg, Oral, Daily, Jaiden Ni MD, 60 mg at 11/25/21 0850    tamsulosin (FLOMAX) capsule 0.4 mg, 0.4 mg, Oral, BID, Jaiden Ni MD, 0.4 mg at 11/25/21 0850    trospium (SANCTURA) tablet 20 mg, 20 mg, Oral, Daily, Jaiden Ni MD, 20 mg at 11/25/21 0850    insulin lispro (HUMALOG) injection vial 0-6 Units, 0-6 Units, SubCUTAneous, TID WC, Jaiden Ni MD, 2 Units at 11/24/21 1157    insulin lispro (HUMALOG) injection vial 0-3 Units, 0-3 Units, SubCUTAneous, Nightly, Jaiden Ni MD, 1 Units at 11/24/21 2003    ondansetron (ZOFRAN-ODT) disintegrating tablet 4 mg, 4 mg, Oral, Q8H PRN **OR** ondansetron (ZOFRAN) injection 4 mg, 4 mg, IntraVENous, Q6H PRN, Jaiden Ni MD    acetaminophen (TYLENOL) tablet 650 mg, 650 mg, Oral, Q6H PRN **OR** acetaminophen (TYLENOL) suppository 650 mg, 650 mg, Rectal, Q6H PRN, Jaiden Ni MD    enoxaparin (LOVENOX) injection 30 mg, 30 mg, SubCUTAneous, Q24H, Jaiden Ni MD, 30 mg at 11/24/21 2002    furosemide (LASIX) injection 40 mg, 40 mg, IntraVENous, BID, Jaiden Ni MD, 40 mg at 11/24/21 1739    ipratropium-albuterol (DUONEB) nebulizer solution 1 ampule, 1 ampule, Inhalation, Q4H, Jaiden Ni MD, 1 ampule at 11/25/21 1027    methylPREDNISolone sodium (SOLU-MEDROL) injection 40 mg, 40 mg, IntraVENous, Q8H, Jaiden iN MD, 40 mg at 11/25/21 0850    doxycycline (VIBRAMYCIN) 100 mg in dextrose 5 % 100 mL IVPB, 100 mg, IntraVENous, Q12H, Jaiden Ni MD, Last Rate: 100 mL/hr at 11/25/21 0850, 100 mg at 11/25/21 0850    glucose (GLUTOSE) 40 % oral gel 15 g, 15 g, Oral, PRN, Jaiden Ni MD    dextrose 50 % IV solution, 12.5 g, IntraVENous, PRN, Dian Reed MD    glucagon (rDNA) injection 1 mg, 1 mg, IntraMUSCular, PRN, Dian Reed MD    dextrose 5 % solution, 100 mL/hr, IntraVENous, PRN, Dian Reed MD    famotidine (PEPCID) injection 20 mg, 20 mg, IntraVENous, Q24H, Dian Reed MD, 20 mg at 21    thiamine mononitrate tablet 100 mg, 100 mg, Oral, Daily, Dian Reed MD, 100 mg at 21 0444    multivitamin 1 tablet, 1 tablet, Oral, Daily, Dian Reed MD, 1 tablet at 21 0850    LORazepam (ATIVAN) tablet 1 mg, 1 mg, Oral, Q1H PRN **OR** LORazepam (ATIVAN) injection 1 mg, 1 mg, IntraVENous, Q1H PRN **OR** LORazepam (ATIVAN) tablet 2 mg, 2 mg, Oral, Q1H PRN **OR** LORazepam (ATIVAN) injection 2 mg, 2 mg, IntraVENous, Q1H PRN **OR** LORazepam (ATIVAN) tablet 3 mg, 3 mg, Oral, Q1H PRN **OR** LORazepam (ATIVAN) injection 3 mg, 3 mg, IntraVENous, Q1H PRN **OR** LORazepam (ATIVAN) tablet 4 mg, 4 mg, Oral, Q1H PRN **OR** LORazepam (ATIVAN) injection 4 mg, 4 mg, IntraVENous, Q1H PRN, Dian Reed MD  No outpatient medications have been marked as taking for the 21 encounter [facility] Encounter). Allergies   Eliquis [apixaban] and Promethazine hcl    Family History       Family History   Problem Relation Age of Onset    Colon Cancer Father     Diabetes Brother     Colon Polyps Neg Hx     Liver Cancer Neg Hx     Liver Disease Neg Hx     Esophageal Cancer Neg Hx     Rectal Cancer Neg Hx     Stomach Cancer Neg Hx      Family history negative for kidney disease.      Social History      Social History     Socioeconomic History    Marital status:      Spouse name: None    Number of children: None    Years of education: None    Highest education level: None   Occupational History    None   Tobacco Use    Smoking status: Former Smoker     Quit date: 6/3/2003     Years since quittin.4    Smokeless tobacco: Never Used   Vaping Use    Vaping Use: Never used   Substance and Sexual Activity  Alcohol use: Yes     Alcohol/week: 12.0 standard drinks     Types: 12 Glasses of wine per week     Comment: 2 glasses of wine every night    Drug use: No    Sexual activity: Yes     Partners: Female   Other Topics Concern    None   Social History Narrative    None     Social Determinants of Health     Financial Resource Strain:     Difficulty of Paying Living Expenses: Not on file   Food Insecurity:     Worried About Running Out of Food in the Last Year: Not on file    Ankit of Food in the Last Year: Not on file   Transportation Needs:     Lack of Transportation (Medical): Not on file    Lack of Transportation (Non-Medical): Not on file   Physical Activity:     Days of Exercise per Week: Not on file    Minutes of Exercise per Session: Not on file   Stress:     Feeling of Stress : Not on file   Social Connections:     Frequency of Communication with Friends and Family: Not on file    Frequency of Social Gatherings with Friends and Family: Not on file    Attends Mu-ism Services: Not on file    Active Member of [address] [place] [address] or Organizations: Not on file    Attends Club or Organization Meetings: Not on file    Marital Status: Not on file   Intimate Partner Violence:     Fear of Current or Ex-Partner: Not on file    Emotionally Abused: Not on file    Physically Abused: Not on file    Sexually Abused: Not on file   Housing Stability:     Unable to Pay for Housing in the Last Year: Not on file    Number of [place] in the Last Year: Not on file    Unstable Housing in the Last Year: Not on file       Physical Exam     Blood pressure 122/61, pulse 63, temperature 97.6 °F (36.4 °C), temperature source Temporal, resp. rate 18, height 6' (1.829 m), weight 233 lb 8 oz (105.9 kg), SpO2 98 %.     General:  NAD, A+Ox3, ill-appearing, normal body habitus  HEENT: Atraumatic, normocephalic  Neck:  Supple, normal range of movement  Chest:  CTAB, good respiratory effort, good air movement  CV:  RRR, systolic murmur  Abdomen:  NTND, soft, +BS, no hepatosplenomegaly  Extremities: Trace  peripheral edema  Neurological:  Moving all four extremities  Lymphatics:  No palpable lymph nodes   Skin:  No rash, no jaundice  Psychiatric:  Normal insight and judgement, good recall    Data     Recent Labs     11/23/21  1627 11/25/21  0409   WBC 14.2* 11.6*   HGB 12.5* 10.1*   HCT 41.5* 33.2*   MCV 94.1* 95.1*    135     Recent Labs     11/23/21  1627 11/23/21  1655 11/24/21  0436 11/24/21  1105 11/25/21  0409     --  138  --  136   K 4.3   < > 4.1 3.5 4.0     --  98  --  97*   CO2 26  --  24  --  23   GLUCOSE 292*  --  289*  --  187*   MG  --   --  2.1  --  1.9   BUN 34*  --  34*  --  41*   CREATININE 3.1*  --  3.0*  --  3.3*   LABGLOM 19*  --  20*  --  18*   GFRAA 24*  --  24*  --  22*    < > = values in this interval not displayed. Assessment:  1. Chronic kidney disease stage IV  2. Hypertensive nephrosclerosis  3. Congestive heart failure with acute exacerbation  4. Previous episode of ANA  5. Osteoarthritis of the knee  6. Type 2 diabetes with kidney disease  7. Acute respiratory failure      Plan:  · Patient serum creatinine is around the baseline with little fluctuation. No dialysis is indicated at this stage. Continue diuretics. Avoid hypotension nephrotoxins. Will check uric acid, PTH and iron studies.     Thank you for asking us to participate in the management of your patient, please do not hesitate to contact me for any concerns regarding my recommendations as outlined above.    -----------------------------  Electronically signed by Tawana Navarro MD on 11/25/21 at 12:55 PM CST

## 2021-11-25 NOTE — PROGRESS NOTES
Hospitalist Progress Note  [facility]     Patient: Yenifer Dhillon  : 1940  MRN: [id number]  Code Status: Full Code    Hospital Day: 2   Date of Service: 2021    Subjective:   Patient seen and examined. S/p extubation yesterday. Patient was initially placed on BiPAP but ultimately required intubation. Pt c/o consitpation      Past Medical History:   Diagnosis Date    Acute liver failure without hepatic coma 10/23/2018    Back pain     \"with tired legs as a result\"    Bladder cancer ([address] 2018    Blood circulation, collateral     Carotid arterial disease (HCC)     recent surgery    CKD (chronic kidney disease), 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/2018    COPD with acute lower respiratory infection ([address] 2021    Epigastric discomfort     GERD (gastroesophageal reflux disease)     Hyperlipidemia     Hypertension     Hypertension     Palliative care patient 10/23/2018    Pneumonia due to infectious organism 2018    Primary osteoarthritis of left knee 10/14/2018    PVD (peripheral vascular disease) (HCC)     Tremor     Tremor on Right side x 1-2 weeks per stepdaughter    Type 2 diabetes mellitus with complication, without long-term current use of insulin ([facility] [address] 2021       Past Surgical History:   Procedure Laterality Date    BACK SURGERY      CARDIAC CATHETERIZATION  2021    100% RCA    COLONOSCOPY  ?     CYSTOSCOPY Bilateral 2018    CYSTOSCOPY, BIOSPY FULGURATION OF BLADDER TUMOR POSSIBLE TURBT, RETROGRADE PYELOGRAM performed by Yovany Lawson MD at [address] COLONOSCOPY FLX DX W/COLLJ SPEC WHEN PFRMD N/A 2017    Dr Tanya Franco internal hemorrhoids, diverticular disease-HP-No recall (age)   Sandy Ortiz MI REVISE MEDIAN N/CARPAL TUNNEL SURG Left 2018    OPEN CARPAL TUNNEL RELEASE performed by Mai Dia MD at [address] Left 2018    LEFT CAROTID ENDARTERECTOMY WITH VEIN PATCH ANGIOPLASTY AND COMPLETION ANGIOGRAM performed by Cynthia Francois MD at [facility] Left 10/15/2018    LEFT COMPLEX TOTAL KNEE ARTHROPLASTY performed by Margie Amezquita MD at [address] [place] [address] GASTROINTESTINAL ENDOSCOPY N/A 11/18/2021    Dr Myla Amaro, Sub prep lg amount of solid and semisolid food/Medication in stomach body/antrum/pylorus, stomach lumen appeared to distended w air insufflation and collapse upon suction,   inadequate exam sugg of gastroparesis, repeat in 10-14 days    VASCULAR SURGERY  04/21/2015    Ashley BESS Ultrasound guided access of left common femoral artery. Aortogram.Diagnostic right lower extremity arteriogram.Radiologic supervision and interpretation.  VASCULAR SURGERY  01/13/2015    Ashley Quesada M.D Atherectomy,angioplasty,and stenting of left superficial femoral artery.  VASCULAR SURGERY  03/11/2014    Ashley Quesada M.D. Ultrasound-guided access of right common femoral artery. Aortogram.Left lower extremity arteriogram.Atherectomy and angioplasty of left superficial femoral artery. Radiologic supervision and interpretation.  VASCULAR SURGERY  01/18/2013    sAhley BESS Aortogram.Multistation arteriogram right lower extremity. Laser atherectomy and angioplasty of right superficial femoral artery. Selective catheterization of right tibioperoneal trunk. Angioplasty of peroneal artery and tibioperoneal trunk.  VASCULAR SURGERY  10/30/2018    [facility]. Ultrasound guided cannulation of right internal vein. Placement of right internal jugular vein tunneled dialysis catheter bard equistream xk 23cm tip to cuff    VASCULAR SURGERY  12/17/2018    [facility]. Removal of tunneled dilaysis catheter right internal jugular vein.        Family History   Problem Relation Age of Onset    Colon Cancer Father     Diabetes Brother     Colon Polyps Neg Hx     Liver Cancer Neg Hx     Liver Disease Neg Hx     Esophageal Cancer Neg Hx     Rectal Cancer Neg Hx     Stomach Cancer Neg Hx        Social History     Socioeconomic History    Marital status:      Spouse name: Not on file    Number of children: Not on file    Years of education: Not on file    Highest education level: Not on file   Occupational History    Not on file   Tobacco Use    Smoking status: Former Smoker     Quit date: 6/3/2003     Years since quittin.4    Smokeless tobacco: Never Used   Vaping Use    Vaping Use: Never used   Substance and Sexual Activity    Alcohol use: Yes     Alcohol/week: 12.0 standard drinks     Types: 12 Glasses of wine per week     Comment: 2 glasses of wine every night    Drug use: No    Sexual activity: Yes     Partners: Female   Other Topics Concern    Not on file   Social History Narrative    Not on file     Social Determinants of Health     Financial Resource Strain:     Difficulty of Paying Living Expenses: Not on file   Food Insecurity:     Worried About Running Out of Food in the Last Year: Not on file    Ankit of Food in the Last Year: Not on file   Transportation Needs:     Lack of Transportation (Medical): Not on file    Lack of Transportation (Non-Medical):  Not on file   Physical Activity:     Days of Exercise per Week: Not on file    Minutes of Exercise per Session: Not on file   Stress:     Feeling of Stress : Not on file   Social Connections:     Frequency of Communication with Friends and Family: Not on file    Frequency of Social Gatherings with Friends and Family: Not on file    Attends Adventism Services: Not on file    Active Member of Clubs or Organizations: Not on file    Attends Club or Organization Meetings: Not on file    Marital Status: Not on file   Intimate Partner Violence:     Fear of Current or Ex-Partner: Not on file    Emotionally Abused: Not on file    Physically Abused: Not on file    Sexually Abused: Not on file   Housing Stability:     Unable to Pay for Housing in the Last Year: Not 650 mg  650 mg Rectal Q6H PRN Mayi Nova MD        enoxaparin (LOVENOX) injection 30 mg  30 mg SubCUTAneous Q24H Mayi Nova MD   30 mg at 11/24/21 2002    furosemide (LASIX) injection 40 mg  40 mg IntraVENous BID Mayi Nova MD   40 mg at 11/24/21 1739    ipratropium-albuterol (DUONEB) nebulizer solution 1 ampule  1 ampule Inhalation Q4H Mayi Nova MD   1 ampule at 11/25/21 0730    methylPREDNISolone sodium (SOLU-MEDROL) injection 40 mg  40 mg IntraVENous Q8H Mayi Nova MD   40 mg at 11/25/21 0850    doxycycline (VIBRAMYCIN) 100 mg in dextrose 5 % 100 mL IVPB  100 mg IntraVENous Q12H Mayi Nova  mL/hr at 11/25/21 0850 100 mg at 11/25/21 0850    glucose (GLUTOSE) 40 % oral gel 15 g  15 g Oral PRN Mayi Nova MD        dextrose 50 % IV solution  12.5 g IntraVENous PRN Mayi Nova MD        glucagon (rDNA) injection 1 mg  1 mg IntraMUSCular PRN Mayi Nova MD        dextrose 5 % solution  100 mL/hr IntraVENous PRJAYLEN Nova MD        famotidine (PEPCID) injection 20 mg  20 mg IntraVENous Q24H Mayi Nova MD   20 mg at 11/24/21 2002    thiamine mononitrate tablet 100 mg  100 mg Oral Daily Mayi Nova MD   100 mg at 11/25/21 0850    multivitamin 1 tablet  1 tablet Oral Daily Mayi Nova MD   1 tablet at 11/25/21 0850    LORazepam (ATIVAN) tablet 1 mg  1 mg Oral Q1H PRN Mayi Nova MD        Or    LORazepam (ATIVAN) injection 1 mg  1 mg IntraVENous Q1H MIRNA Nova MD        Or    LORazepam (ATIVAN) tablet 2 mg  2 mg Oral Q1H PRN Mayi Nova MD        Or    LORazepam (ATIVAN) injection 2 mg  2 mg IntraVENous Q1H MIRNA Nova MD        Or    LORazepam (ATIVAN) tablet 3 mg  3 mg Oral Q1H PRJAYLEN Nova MD        Or    LORazepam (ATIVAN) injection 3 mg  3 mg IntraVENous Q1H PRJAYLEN Nova MD        Or    LORazepam (ATIVAN) tablet 4 mg  4 mg Oral Q1H PRN Mayi Nova MD        Or    LORazepam (ATIVAN) injection 4 mg  4 mg IntraVENous Q1H PRN Yanely Serrano MD             dextrose          Objective:   /61   Pulse 59   Temp 97.5 °F (36.4 °C) (Temporal)   Resp 18   Ht 6' (1.829 m)   Wt 233 lb 8 oz (105.9 kg)   SpO2 98%   BMI 31.67 kg/m²     General: no acute distress  HEENT: normocephalic, atraumatic  Neck: supple, symmetrical, trachea midline   Lungs: bilateral crackles  Cardiovascular: s1 and s2 normal  Abdomen: soft, positive bowel sounds  Extremities: 2+ pitting edema in lower extremities bilaterally, chronic venous stasis changes  Neuro: intubated and sedated    Recent Labs     11/23/21 1627 11/25/21  0409   WBC 14.2* 11.6*   RBC 4.41* 3.49*   HGB 12.5* 10.1*   HCT 41.5* 33.2*   MCV 94.1* 95.1*   MCH 28.3 28.9   MCHC 30.1* 30.4*    135     Recent Labs     11/23/21  1627 11/23/21  1655 11/24/21  0436 11/24/21  1105 11/25/21  0409     --  138  --  136   K 4.3   < > 4.1 3.5 4.0   ANIONGAP 14  --  16  --  16     --  98  --  97*   CO2 26  --  24  --  23   BUN 34*  --  34*  --  41*   CREATININE 3.1*  --  3.0*  --  3.3*   GLUCOSE 292*  --  289*  --  187*   CALCIUM 9.2  --  8.9  --  8.6*    < > = values in this interval not displayed. Recent Labs     11/24/21  0436 11/25/21  0409   MG 2.1 1.9     Recent Labs     11/23/21  1627 11/24/21  0436 11/25/21  0409   AST 15 10 10   ALT 14 12 11   BILITOT 0.8 0.8 0.7   ALKPHOS 146* 120 101     No results for input(s): PH, PO2, PCO2, HCO3, BE, O2SAT in the last 72 hours. Recent Labs     11/23/21  1627 11/23/21  2244 11/24/21  0436   TROPONINI 0.02 0.02 0.02     Recent Labs     11/23/21  1627   INR 1.16     No results for input(s): LACTA in the last 72 hours.       Intake/Output Summary (Last 24 hours) at 11/25/2021 1007  Last data filed at 11/25/2021 0928  Gross per 24 hour   Intake 660 ml   Output 535 ml   Net 125 ml       XR CHEST PORTABLE    Result Date: 11/23/2021  EXAMINATION: XR CHEST PORTABLE 11/23/2021 7:05 PM HISTORY: ET tube placement Single view the chest obtained compared prior exam the same date. Bilateral parahilar opacities are present consistent with pulmonary edema. Cardiac silhouettes mildly enlarged. Endotracheal tube is satisfactorily positioned. Pacing device is present in the soft tissues of the left chest.    Mild cardiomegaly and changes of parahilar interstitial pulmonary edema unchanged in one hour. 2. Endotracheal tube satisfactorily positioned Signed by Dr Columba Andrews    XR CHEST PORTABLE    Result Date: 11/23/2021  EXAM: XR CHEST PORTABLE -- 11/23/2021 4:43 PM HISTORY: 81 years, Male, dyspnea COMPARISON: 8/26/2021 TECHNIQUE:  2 images. Frontal view of the chest. FINDINGS:  No pneumothorax. Bilateral perihilar and interstitial opacities. Possible layering left pleural effusion. No large right pleural effusion. Cardiac mediastinal silhouette similar to prior. Calcified aortic atherosclerosis. No acute bony finding. Left chest cardiac pulse generator with 2 grossly intact leads. 1. Findings most suggestive of pulmonary edema. Signed by Dr Leah Becker and Plan:   11/25:  consipation- laxative  S/p extubation yesterday and transferred from ICU  Wean 02  C/w IV bumex  Cardio consulted. Pt/ot  Nephro consulted.       Acute on chronic decompensated diastolic congestive heart failure  Diuresis  Strict I's and O's  Daily weights  Serial troponin 0.02 x3  TTE 2/14/2021 reviewed     COPD exacerbation  Steroids  Nebs  Antibiotics     Ventilator dependent acute hypercapnic and hypoxemic respiratory failure  Suspect secondary to above processes  Titrate minute ventilation for pH 7.35  Follow ABG/CXR  SBT once able     History of atrial fibrillation  Home rate control meds on board  Patient not on AC  Follow electrolytes     Alcohol dependence  Monitor for withdrawal  CIWA protocol  Daily thiamine/folic acid/MVI  Follow electrolytes     CKD 4  Follow renal function/urine output/electrolytes  Avoid offending agents     DM2  Meds on board     DVT prophylaxis  Lovenox        Marcel Meigs, MD   11/25/2021 10:07 AM

## 2021-11-25 NOTE — PLAN OF CARE
Problem: Skin Integrity:  Goal: Will show no infection signs and symptoms  Description: Will show no infection signs and symptoms  11/24/2021 2324 by Mario Musa RN  Outcome: Ongoing  11/24/2021 0949 by Rowan Prescott RN  Outcome: Ongoing  Goal: Absence of new skin breakdown  Description: Absence of new skin breakdown  11/24/2021 2324 by Mario Musa RN  Outcome: Ongoing  11/24/2021 0949 by Rowan Prescott RN  Outcome: Ongoing     Problem: Falls - Risk of:  Goal: Will remain free from falls  Description: Will remain free from falls  11/24/2021 2324 by Mario Musa RN  Outcome: Ongoing  11/24/2021 0949 by Rowan Prescott RN  Outcome: Ongoing  Goal: Absence of physical injury  Description: Absence of physical injury  11/24/2021 2324 by Mario Musa RN  Outcome: Ongoing  11/24/2021 0949 by Rowan Prescott RN  Outcome: Ongoing

## 2021-11-26 LAB
ALBUMIN SERPL-MCNC: 3.9 G/DL (ref 3.5–5.2)
ALP BLD-CCNC: 103 U/L (ref 40–130)
ALT SERPL-CCNC: 14 U/L (ref 5–41)
ANION GAP SERPL CALCULATED.3IONS-SCNC: 17 MMOL/L (ref 7–19)
AST SERPL-CCNC: 11 U/L (ref 5–40)
BASOPHILS ABSOLUTE: 0 K/UL (ref 0–0.2)
BASOPHILS RELATIVE PERCENT: 0.1 % (ref 0–1)
BILIRUB SERPL-MCNC: 0.5 MG/DL (ref 0.2–1.2)
BUN BLDV-MCNC: 49 MG/DL (ref 8–23)
CALCIUM SERPL-MCNC: 8.9 MG/DL (ref 8.8–10.2)
CHLORIDE BLD-SCNC: 93 MMOL/L (ref 98–111)
CO2: 25 MMOL/L (ref 22–29)
CREAT SERPL-MCNC: 3.3 MG/DL (ref 0.5–1.2)
EOSINOPHILS ABSOLUTE: 0 K/UL (ref 0–0.6)
EOSINOPHILS RELATIVE PERCENT: 0.1 % (ref 0–5)
GFR AFRICAN AMERICAN: 22
GFR NON-AFRICAN AMERICAN: 18
GLUCOSE BLD-MCNC: 248 MG/DL (ref 70–99)
GLUCOSE BLD-MCNC: 254 MG/DL (ref 70–99)
GLUCOSE BLD-MCNC: 257 MG/DL (ref 70–99)
GLUCOSE BLD-MCNC: 257 MG/DL (ref 70–99)
GLUCOSE BLD-MCNC: 265 MG/DL (ref 74–109)
HCT VFR BLD CALC: 33 % (ref 42–52)
HEMOGLOBIN: 10.4 G/DL (ref 14–18)
IMMATURE GRANULOCYTES #: 0.2 K/UL
LYMPHOCYTES ABSOLUTE: 0.5 K/UL (ref 1.1–4.5)
LYMPHOCYTES RELATIVE PERCENT: 4.5 % (ref 20–40)
MAGNESIUM: 2.2 MG/DL (ref 1.6–2.4)
MCH RBC QN AUTO: 28.7 PG (ref 27–31)
MCHC RBC AUTO-ENTMCNC: 31.5 G/DL (ref 33–37)
MCV RBC AUTO: 91.2 FL (ref 80–94)
MONOCYTES ABSOLUTE: 0.6 K/UL (ref 0–0.9)
MONOCYTES RELATIVE PERCENT: 5 % (ref 0–10)
NEUTROPHILS ABSOLUTE: 10 K/UL (ref 1.5–7.5)
NEUTROPHILS RELATIVE PERCENT: 88.7 % (ref 50–65)
PARATHYROID HORMONE INTACT: 86.1 PG/ML (ref 15–65)
PDW BLD-RTO: 13.6 % (ref 11.5–14.5)
PERFORMED ON: ABNORMAL
PLATELET # BLD: 158 K/UL (ref 130–400)
PMV BLD AUTO: 11.6 FL (ref 9.4–12.4)
POTASSIUM SERPL-SCNC: 3.7 MMOL/L (ref 3.5–5)
RBC # BLD: 3.62 M/UL (ref 4.7–6.1)
SODIUM BLD-SCNC: 135 MMOL/L (ref 136–145)
TOTAL CK: 16 U/L (ref 39–308)
TOTAL PROTEIN: 6 G/DL (ref 6.6–8.7)
URIC ACID, SERUM: 7.3 MG/DL (ref 3.4–7)
WBC # BLD: 11.3 K/UL (ref 4.8–10.8)

## 2021-11-26 PROCEDURE — 97161 PT EVAL LOW COMPLEX 20 MIN: CPT

## 2021-11-26 PROCEDURE — 97165 OT EVAL LOW COMPLEX 30 MIN: CPT

## 2021-11-26 PROCEDURE — 85025 COMPLETE CBC W/AUTO DIFF WBC: CPT

## 2021-11-26 PROCEDURE — 36415 COLL VENOUS BLD VENIPUNCTURE: CPT

## 2021-11-26 PROCEDURE — 92610 EVALUATE SWALLOWING FUNCTION: CPT

## 2021-11-26 PROCEDURE — 6360000002 HC RX W HCPCS: Performed by: INTERNAL MEDICINE

## 2021-11-26 PROCEDURE — 1210000000 HC MED SURG R&B

## 2021-11-26 PROCEDURE — 84550 ASSAY OF BLOOD/URIC ACID: CPT

## 2021-11-26 PROCEDURE — 83735 ASSAY OF MAGNESIUM: CPT

## 2021-11-26 PROCEDURE — 6370000000 HC RX 637 (ALT 250 FOR IP): Performed by: PHYSICIAN ASSISTANT

## 2021-11-26 PROCEDURE — 2500000003 HC RX 250 WO HCPCS: Performed by: INTERNAL MEDICINE

## 2021-11-26 PROCEDURE — 94640 AIRWAY INHALATION TREATMENT: CPT

## 2021-11-26 PROCEDURE — 6370000000 HC RX 637 (ALT 250 FOR IP): Performed by: INTERNAL MEDICINE

## 2021-11-26 PROCEDURE — 83970 ASSAY OF PARATHORMONE: CPT

## 2021-11-26 PROCEDURE — 99232 SBSQ HOSP IP/OBS MODERATE 35: CPT | Performed by: PHYSICIAN ASSISTANT

## 2021-11-26 PROCEDURE — 2700000000 HC OXYGEN THERAPY PER DAY

## 2021-11-26 PROCEDURE — 82947 ASSAY GLUCOSE BLOOD QUANT: CPT

## 2021-11-26 PROCEDURE — 2580000003 HC RX 258: Performed by: INTERNAL MEDICINE

## 2021-11-26 PROCEDURE — 82550 ASSAY OF CK (CPK): CPT

## 2021-11-26 PROCEDURE — 80053 COMPREHEN METABOLIC PANEL: CPT

## 2021-11-26 RX ORDER — DOCUSATE SODIUM 100 MG/1
100 CAPSULE, LIQUID FILLED ORAL 2 TIMES DAILY
Status: DISCONTINUED | OUTPATIENT
Start: 2021-11-26 | End: 2021-11-30 | Stop reason: HOSPADM

## 2021-11-26 RX ADMIN — IPRATROPIUM BROMIDE AND ALBUTEROL SULFATE 1 AMPULE: .5; 2.5 SOLUTION RESPIRATORY (INHALATION) at 09:57

## 2021-11-26 RX ADMIN — FERROUS SULFATE TAB 325 MG (65 MG ELEMENTAL FE) 325 MG: 325 (65 FE) TAB at 17:51

## 2021-11-26 RX ADMIN — METHYLPREDNISOLONE SODIUM SUCCINATE 40 MG: 40 INJECTION, POWDER, FOR SOLUTION INTRAMUSCULAR; INTRAVENOUS at 09:52

## 2021-11-26 RX ADMIN — FERROUS SULFATE TAB 325 MG (65 MG ELEMENTAL FE) 325 MG: 325 (65 FE) TAB at 09:52

## 2021-11-26 RX ADMIN — DOXYCYCLINE 100 MG: 100 INJECTION, POWDER, LYOPHILIZED, FOR SOLUTION INTRAVENOUS at 20:51

## 2021-11-26 RX ADMIN — IPRATROPIUM BROMIDE AND ALBUTEROL SULFATE 1 AMPULE: .5; 2.5 SOLUTION RESPIRATORY (INHALATION) at 06:18

## 2021-11-26 RX ADMIN — TROSPIUM CHLORIDE 20 MG: 20 TABLET, FILM COATED ORAL at 09:40

## 2021-11-26 RX ADMIN — IPRATROPIUM BROMIDE AND ALBUTEROL SULFATE 1 AMPULE: .5; 2.5 SOLUTION RESPIRATORY (INHALATION) at 22:22

## 2021-11-26 RX ADMIN — AMIODARONE HYDROCHLORIDE 200 MG: 200 TABLET ORAL at 09:40

## 2021-11-26 RX ADMIN — DOCUSATE SODIUM 100 MG: 100 CAPSULE, LIQUID FILLED ORAL at 15:25

## 2021-11-26 RX ADMIN — INSULIN LISPRO 2 UNITS: 100 INJECTION, SOLUTION INTRAVENOUS; SUBCUTANEOUS at 09:52

## 2021-11-26 RX ADMIN — THERA TABS 1 TABLET: TAB at 09:41

## 2021-11-26 RX ADMIN — INSULIN LISPRO 2 UNITS: 100 INJECTION, SOLUTION INTRAVENOUS; SUBCUTANEOUS at 21:22

## 2021-11-26 RX ADMIN — ASPIRIN 81 MG: 81 TABLET, COATED ORAL at 09:40

## 2021-11-26 RX ADMIN — INSULIN LISPRO 3 UNITS: 100 INJECTION, SOLUTION INTRAVENOUS; SUBCUTANEOUS at 13:22

## 2021-11-26 RX ADMIN — ENOXAPARIN SODIUM 30 MG: 30 INJECTION SUBCUTANEOUS at 20:52

## 2021-11-26 RX ADMIN — ATORVASTATIN CALCIUM 20 MG: 40 TABLET, FILM COATED ORAL at 09:40

## 2021-11-26 RX ADMIN — LORAZEPAM 1 MG: 2 INJECTION INTRAMUSCULAR; INTRAVENOUS at 18:34

## 2021-11-26 RX ADMIN — METOCLOPRAMIDE 5 MG: 10 TABLET ORAL at 09:41

## 2021-11-26 RX ADMIN — METOPROLOL SUCCINATE 100 MG: 50 TABLET, EXTENDED RELEASE ORAL at 09:40

## 2021-11-26 RX ADMIN — FAMOTIDINE 20 MG: 10 INJECTION, SOLUTION INTRAVENOUS at 20:51

## 2021-11-26 RX ADMIN — TAMSULOSIN HYDROCHLORIDE 0.4 MG: 0.4 CAPSULE ORAL at 20:52

## 2021-11-26 RX ADMIN — METOCLOPRAMIDE 5 MG: 10 TABLET ORAL at 20:51

## 2021-11-26 RX ADMIN — INSULIN LISPRO 3 UNITS: 100 INJECTION, SOLUTION INTRAVENOUS; SUBCUTANEOUS at 18:49

## 2021-11-26 RX ADMIN — METOCLOPRAMIDE 5 MG: 10 TABLET ORAL at 15:25

## 2021-11-26 RX ADMIN — THIAMINE HCL TAB 100 MG 100 MG: 100 TAB at 09:41

## 2021-11-26 RX ADMIN — IPRATROPIUM BROMIDE AND ALBUTEROL SULFATE 1 AMPULE: .5; 2.5 SOLUTION RESPIRATORY (INHALATION) at 15:05

## 2021-11-26 RX ADMIN — ACETAMINOPHEN 650 MG: 325 TABLET ORAL at 18:30

## 2021-11-26 RX ADMIN — FUROSEMIDE 40 MG: 10 INJECTION, SOLUTION INTRAMUSCULAR; INTRAVENOUS at 09:40

## 2021-11-26 RX ADMIN — METHYLPREDNISOLONE SODIUM SUCCINATE 40 MG: 40 INJECTION, POWDER, FOR SOLUTION INTRAMUSCULAR; INTRAVENOUS at 17:51

## 2021-11-26 RX ADMIN — DOXYCYCLINE 100 MG: 100 INJECTION, POWDER, LYOPHILIZED, FOR SOLUTION INTRAVENOUS at 09:41

## 2021-11-26 RX ADMIN — FOLIC ACID 1 MG: 1 TABLET ORAL at 09:40

## 2021-11-26 RX ADMIN — IPRATROPIUM BROMIDE AND ALBUTEROL SULFATE 1 AMPULE: .5; 2.5 SOLUTION RESPIRATORY (INHALATION) at 18:50

## 2021-11-26 RX ADMIN — TAMSULOSIN HYDROCHLORIDE 0.4 MG: 0.4 CAPSULE ORAL at 09:40

## 2021-11-26 RX ADMIN — NIFEDIPINE 60 MG: 60 TABLET, EXTENDED RELEASE ORAL at 09:40

## 2021-11-26 RX ADMIN — FUROSEMIDE 40 MG: 10 INJECTION, SOLUTION INTRAMUSCULAR; INTRAVENOUS at 17:51

## 2021-11-26 RX ADMIN — DOCUSATE SODIUM 100 MG: 100 CAPSULE, LIQUID FILLED ORAL at 20:52

## 2021-11-26 ASSESSMENT — PAIN DESCRIPTION - DESCRIPTORS
DESCRIPTORS: DISCOMFORT
DESCRIPTORS: ACHING

## 2021-11-26 ASSESSMENT — PAIN DESCRIPTION - PROGRESSION
CLINICAL_PROGRESSION: NOT CHANGED
CLINICAL_PROGRESSION: GRADUALLY WORSENING

## 2021-11-26 ASSESSMENT — PAIN - FUNCTIONAL ASSESSMENT
PAIN_FUNCTIONAL_ASSESSMENT: PREVENTS OR INTERFERES SOME ACTIVE ACTIVITIES AND ADLS
PAIN_FUNCTIONAL_ASSESSMENT: PREVENTS OR INTERFERES SOME ACTIVE ACTIVITIES AND ADLS

## 2021-11-26 ASSESSMENT — PAIN DESCRIPTION - ONSET
ONSET: GRADUAL
ONSET: ON-GOING

## 2021-11-26 ASSESSMENT — PAIN DESCRIPTION - LOCATION
LOCATION: ABDOMEN
LOCATION: ABDOMEN

## 2021-11-26 ASSESSMENT — PAIN DESCRIPTION - FREQUENCY: FREQUENCY: INTERMITTENT

## 2021-11-26 ASSESSMENT — PAIN DESCRIPTION - PAIN TYPE
TYPE: ACUTE PAIN
TYPE: ACUTE PAIN

## 2021-11-26 ASSESSMENT — PAIN SCALES - GENERAL
PAINLEVEL_OUTOF10: 2
PAINLEVEL_OUTOF10: 7

## 2021-11-26 ASSESSMENT — PAIN SCALES - WONG BAKER
WONGBAKER_NUMERICALRESPONSE: 2
WONGBAKER_NUMERICALRESPONSE: 2;4

## 2021-11-26 NOTE — PLAN OF CARE
Problem: Skin Integrity:  Goal: Will show no infection signs and symptoms  Description: Will show no infection signs and symptoms  Outcome: Ongoing  Goal: Absence of new skin breakdown  Description: Absence of new skin breakdown  Outcome: Ongoing     Problem: Falls - Risk of:  Goal: Will remain free from falls  Description: Will remain free from falls  Outcome: Ongoing  Goal: Absence of physical injury  Description: Absence of physical injury  Outcome: Ongoing     Problem:  Activity:  Goal: Capacity to carry out activities will improve  Description: Capacity to carry out activities will improve  Outcome: Ongoing  Goal: Will verbalize the importance of balancing activity with adequate rest periods  Description: Will verbalize the importance of balancing activity with adequate rest periods  Outcome: Ongoing     Problem: Cardiac:  Goal: Hemodynamic stability will improve  Description: Hemodynamic stability will improve  Outcome: Ongoing  Goal: Ability to maintain an adequate cardiac output will improve  Description: Ability to maintain an adequate cardiac output will improve  Outcome: Ongoing     Problem: Coping:  Goal: Verbalizations of decreased anxiety will decrease  Description: Verbalizations of decreased anxiety will decrease  Outcome: Ongoing     Problem: Fluid Volume:  Goal: Risk for excess fluid volume will decrease  Description: Risk for excess fluid volume will decrease  Outcome: Ongoing  Goal: Maintenance of adequate hydration will improve  Description: Maintenance of adequate hydration will improve  Outcome: Ongoing  Goal: Will show no signs and symptoms of electrolyte imbalance  Description: Will show no signs and symptoms of electrolyte imbalance  Outcome: Ongoing     Problem: Health Behavior:  Goal: Ability to manage health-related needs will improve  Description: Ability to manage health-related needs will improve  Outcome: Ongoing  Goal: Ability to seek appropriate health care will improve  Description: Ability to seek appropriate health care will improve  Outcome: Ongoing     Problem: Nutritional:  Goal: Maintenance of adequate nutrition will improve  Description: Maintenance of adequate nutrition will improve  Outcome: Ongoing     Problem: Physical Regulation:  Goal: Complications related to the disease process, condition or treatment will be avoided or minimized  Description: Complications related to the disease process, condition or treatment will be avoided or minimized  Outcome: Ongoing     Problem: Respiratory:  Goal: Ability to maintain adequate ventilation will improve  Description: Ability to maintain adequate ventilation will improve  Outcome: Ongoing  Goal: Respiratory status will improve  Description: Respiratory status will improve  Outcome: Ongoing

## 2021-11-26 NOTE — PROGRESS NOTES
Hospitalist Progress Note  [facility]     Patient: Yenifer Dhillon  : 1940  MRN: [id number]  Code Status: Full Code    Hospital Day: 3   Date of Service: 2021    Subjective:   Patient seen and examined. Pt seems to be breathing better, denies any chest pain. Still c/o constipation. Past Medical History:   Diagnosis Date    Acute liver failure without hepatic coma 10/23/2018    Back pain     \"with tired legs as a result\"    Bladder cancer ([facility] 2018    Blood circulation, collateral     Carotid arterial disease (HCC)     recent surgery    CKD (chronic kidney disease), 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/2018    COPD with acute lower respiratory infection ([facility] [facility] 2021    Epigastric discomfort     GERD (gastroesophageal reflux disease)     Hyperlipidemia     Hypertension     Hypertension     Palliative care patient 10/23/2018    Pneumonia due to infectious organism 2018    Primary osteoarthritis of left knee 10/14/2018    PVD (peripheral vascular disease) (HCC)     Tremor     Tremor on Right side x 1-2 weeks per stepdaughter    Type 2 diabetes mellitus with complication, without long-term current use of insulin ([facility] [facility] 2021       Past Surgical History:   Procedure Laterality Date    BACK SURGERY      CARDIAC CATHETERIZATION  2021    100% RCA    COLONOSCOPY  ?     CYSTOSCOPY Bilateral 2018    CYSTOSCOPY, BIOSPY FULGURATION OF BLADDER TUMOR POSSIBLE TURBT, RETROGRADE PYELOGRAM performed by Yovany Lawson MD at [address] COLONOSCOPY FLX DX W/COLLJ SPEC WHEN PFRMD N/A 2017    Dr Tanya Franco internal hemorrhoids, diverticular disease-HP-No recall (age)   Sandy Ortiz SD REVISE MEDIAN N/CARPAL TUNNEL SURG Left 2018    OPEN CARPAL TUNNEL RELEASE performed by Mai Dia MD at [address] Left 2018    LEFT CAROTID ENDARTERECTOMY WITH VEIN PATCH ANGIOPLASTY AND COMPLETION ANGIOGRAM performed by Mirlande Elliott MD at [address] ARTHROPLASTY Left 10/15/2018    LEFT COMPLEX TOTAL KNEE ARTHROPLASTY performed by Ember Cole MD at [address] [place] [address] GASTROINTESTINAL ENDOSCOPY N/A 11/18/2021    Dr Isabelle Vargas, Sub prep lg amount of solid and semisolid food/Medication in stomach body/antrum/pylorus, stomach lumen appeared to distended w air insufflation and collapse upon suction,   inadequate exam sugg of gastroparesis, repeat in 10-14 days    VASCULAR SURGERY  04/21/2015    Peri BESS Ultrasound guided access of left common femoral artery. Aortogram.Diagnostic right lower extremity arteriogram.Radiologic supervision and interpretation.  VASCULAR SURGERY  01/13/2015    Peri Han M.D Atherectomy,angioplasty,and stenting of left superficial femoral artery.  VASCULAR SURGERY  03/11/2014    Peri Han M.D. Ultrasound-guided access of right common femoral artery. Aortogram.Left lower extremity arteriogram.Atherectomy and angioplasty of left superficial femoral artery. Radiologic supervision and interpretation.  VASCULAR SURGERY  01/18/2013    Peri BESS Aortogram.Multistation arteriogram right lower extremity. Laser atherectomy and angioplasty of right superficial femoral artery. Selective catheterization of right tibioperoneal trunk. Angioplasty of peroneal artery and tibioperoneal trunk.  VASCULAR SURGERY  10/30/2018    SJS. Ultrasound guided cannulation of right internal vein. Placement of right internal jugular vein tunneled dialysis catheter bard equistream xk 23cm tip to cuff    VASCULAR SURGERY  12/17/2018    SJS. Removal of tunneled dilaysis catheter right internal jugular vein.        Family History   Problem Relation Age of Onset    Colon Cancer Father     Diabetes Brother     Colon Polyps Neg Hx     Liver Cancer Neg Hx     Liver Disease Neg Hx     Esophageal Cancer Neg Hx     Rectal Cancer Neg Hx     Stomach Cancer Neg Hx        Social History     Socioeconomic History    Marital status:      Spouse name: Not on file    Number of children: Not on file    Years of education: Not on file    Highest education level: Not on file   Occupational History    Not on file   Tobacco Use    Smoking status: Former Smoker     Quit date: 6/3/2003     Years since quittin.4    Smokeless tobacco: Never Used   Vaping Use    Vaping Use: Never used   Substance and Sexual Activity    Alcohol use: Yes     Alcohol/week: 12.0 standard drinks     Types: 12 Glasses of wine per week     Comment: 2 glasses of wine every night    Drug use: No    Sexual activity: Yes     Partners: Female   Other Topics Concern    Not on file   Social History Narrative    Not on file     Social Determinants of Health     Financial Resource Strain:     Difficulty of Paying Living Expenses: Not on file   Food Insecurity:     Worried About Running Out of Food in the Last Year: Not on file    Ankit of Food in the Last Year: Not on file   Transportation Needs:     Lack of Transportation (Medical): Not on file    Lack of Transportation (Non-Medical):  Not on file   Physical Activity:     Days of Exercise per Week: Not on file    Minutes of Exercise per Session: Not on file   Stress:     Feeling of Stress : Not on file   Social Connections:     Frequency of Communication with Friends and Family: Not on file    Frequency of Social Gatherings with Friends and Family: Not on file    Attends [employer] Services: Not on file    Active Member of Clubs or Organizations: Not on file    Attends Club or Organization Meetings: Not on file    Marital Status: Not on file   Intimate Partner Violence:     Fear of Current or Ex-Partner: Not on file    Emotionally Abused: Not on file    Physically Abused: Not on file    Sexually Abused: Not on file   Housing Stability:     Unable to Pay for Housing in the Last Year: Not on file    Number of [facility] in the Last Year: Not on file    Unstable Housing in the Last Year: Not on file       Current Facility-Administered Medications   Medication Dose Route Frequency Provider Last Rate Last Admin    amiodarone (CORDARONE) tablet 200 mg  200 mg Oral Daily Ada Estrada MD   200 mg at 11/25/21 0850    aspirin EC tablet 81 mg  81 mg Oral Daily Ada Estrada MD   81 mg at 11/25/21 0850    atorvastatin (LIPITOR) tablet 20 mg  20 mg Oral Daily Ada Estrada MD   20 mg at 11/25/21 0850    [Held by provider] bumetanide (BUMEX) tablet 1 mg  1 mg Oral Daily Ada Estrada MD        ferrous sulfate (IRON 325) tablet 325 mg  325 mg Oral BID  Ada Estrada MD   325 mg at 19/30/38 3930    folic acid (FOLVITE) tablet 1 mg  1 mg Oral Daily Ada Estrada MD   1 mg at 11/25/21 0850    metoclopramide (REGLAN) tablet 5 mg  5 mg Oral TID Ada Estrada MD   5 mg at 11/25/21 2054    metoprolol succinate (TOPROL XL) extended release tablet 100 mg  100 mg Oral Daily Ada Estrada MD   100 mg at 11/25/21 0850    NIFEdipine (PROCARDIA XL) extended release tablet 60 mg  60 mg Oral Daily Ada Estrada MD   60 mg at 11/25/21 0850    tamsulosin (FLOMAX) capsule 0.4 mg  0.4 mg Oral BID Ada Estrada MD   0.4 mg at 11/25/21 2054    trospium (SANCTURA) tablet 20 mg  20 mg Oral Daily Ada Estrada MD   20 mg at 11/25/21 0850    insulin lispro (HUMALOG) injection vial 0-6 Units  0-6 Units SubCUTAneous TID  Ada Estrada MD   3 Units at 11/25/21 1743    insulin lispro (HUMALOG) injection vial 0-3 Units  0-3 Units SubCUTAneous Nightly Ada Estrada MD   2 Units at 11/25/21 2108    ondansetron (ZOFRAN-ODT) disintegrating tablet 4 mg  4 mg Oral Q8H PRN Ada Estrada MD        Or    ondansetron [facility]) injection 4 mg  4 mg IntraVENous Q6H PRN Ada Estrada MD        acetaminophen (TYLENOL) tablet 650 mg  650 mg Oral Q6H PRN Ada Estrada MD        Or    acetaminophen (TYLENOL) suppository 650 mg  650 mg Rectal Q6H PRN Ambrose Roque Darleen Motta MD        enoxaparin (LOVENOX) injection 30 mg  30 mg SubCUTAneous Q24H Ignacio Robertson MD   30 mg at 11/25/21 2054    furosemide (LASIX) injection 40 mg  40 mg IntraVENous BID Ignacio Robertson MD   40 mg at 11/25/21 2317    ipratropium-albuterol (DUONEB) nebulizer solution 1 ampule  1 ampule Inhalation Q4H Ignacio Robertson MD   1 ampule at 11/26/21 0618    methylPREDNISolone sodium (SOLU-MEDROL) injection 40 mg  40 mg IntraVENous Q8H Ignacio Robertson MD   40 mg at 11/25/21 2317    doxycycline (VIBRAMYCIN) 100 mg in dextrose 5 % 100 mL IVPB  100 mg IntraVENous Q12H Ignacio Robertson MD   Stopped at 11/25/21 2154    glucose (GLUTOSE) 40 % oral gel 15 g  15 g Oral PRN Ignacio Robertson MD        dextrose 50 % IV solution  12.5 g IntraVENous PRN Ignacio Robertson MD        glucagon (rDNA) injection 1 mg  1 mg IntraMUSCular PRN Ignacio Robertson MD        dextrose 5 % solution  100 mL/hr IntraVENous PRN Ignacio Robertson MD        famotidine (PEPCID) injection 20 mg  20 mg IntraVENous Q24H Ignacio Robertson MD   20 mg at 11/25/21 2318    thiamine mononitrate tablet 100 mg  100 mg Oral Daily Ignacio Robertson MD   100 mg at 11/25/21 0850    multivitamin 1 tablet  1 tablet Oral Daily Ignacio Robertson MD   1 tablet at 11/25/21 0850    LORazepam (ATIVAN) tablet 1 mg  1 mg Oral Q1H PRN Ignacio Robertson MD        Or    LORazepam (ATIVAN) injection 1 mg  1 mg IntraVENous Q1H PRN Ignacio Robertson MD        Or    LORazepam (ATIVAN) tablet 2 mg  2 mg Oral Q1H PRN Ignacio Robertson MD        Or    LORazepam (ATIVAN) injection 2 mg  2 mg IntraVENous Q1H PRN Ignacio Robertson MD        Or    LORazepam (ATIVAN) tablet 3 mg  3 mg Oral Q1H PRN Ignacio Robertson MD        Or    LORazepam (ATIVAN) injection 3 mg  3 mg IntraVENous Q1H PRN Ignacio Robertson MD        Or    LORazepam (ATIVAN) tablet 4 mg  4 mg Oral Q1H PRN Ignacio Robertson MD        Or    LORazepam (ATIVAN) injection 4 mg  4 mg IntraVENous Q1H PRN Ignacio Robertson MD             dextrose opacities are present consistent with pulmonary edema. Cardiac silhouettes mildly enlarged. Endotracheal tube is satisfactorily positioned. Pacing device is present in the soft tissues of the left chest.    Mild cardiomegaly and changes of parahilar interstitial pulmonary edema unchanged in one hour. 2. Endotracheal tube satisfactorily positioned Signed by Dr Columba Andrews    XR CHEST PORTABLE    Result Date: 11/23/2021  EXAM: XR CHEST PORTABLE -- 11/23/2021 4:43 PM HISTORY: 81 years, Male, dyspnea COMPARISON: 8/26/2021 TECHNIQUE:  2 images. Frontal view of the chest. FINDINGS:  No pneumothorax. Bilateral perihilar and interstitial opacities. Possible layering left pleural effusion. No large right pleural effusion. Cardiac mediastinal silhouette similar to prior. Calcified aortic atherosclerosis. No acute bony finding. Left chest cardiac pulse generator with 2 grossly intact leads. 1. Findings most suggestive of pulmonary edema. Signed by Dr Leah Becker and Plan:   11/26:  consipation- prn laxative, stool softeners. S/p extubation 11/24 and transferred from ICU  Wean 02  Renal function stbale  C/w IV lasix bid  Echocardiogram is pending. His Destiny vas score is 6. Will need oral anticoagulation on discharge  Cardio consulted.   Pt/ot  Nephro on board      Acute on chronic decompensated diastolic congestive heart failure  Diuresis  Strict I's and O's  Daily weights  Serial troponin 0.02 x3  TTE 2/14/2021 reviewed     COPD exacerbation  Steroids  Nebs  Antibiotics     Ventilator dependent acute hypercapnic and hypoxemic respiratory failure  Suspect secondary to above processes  Titrate minute ventilation for pH 7.35  Follow ABG/CXR  SBT once able     History of atrial fibrillation  Home rate control meds on board  Patient not on AC  Follow electrolytes     Alcohol dependence  Monitor for withdrawal  CIWA protocol  Daily thiamine/folic acid/MVI  Follow electrolytes     CKD 4  Follow renal function/urine output/electrolytes  Avoid offending agents     DM2  Meds on board     DVT prophylaxis  Bolivar Butt MD   11/26/2021 8:21 AM

## 2021-11-26 NOTE — PROGRESS NOTES
Nephrology ([facility]) Progress Note    Patient:  Yvonne Castillo  YOB: 1940  Date of Service: 11/26/2021  MRN: [id number]   Acct: [de-identified]   Primary Care Physician: Daniela Gaona MD  Advance Directive: Full Code  Admit Date: 11/23/2021       Hospital Day: 3  Referring Provider: Phillip Dixon MD    Patient Seen, Chart, Consults notes, Labs, Radiology studies reviewed. Subjective:    Patient is an [de-identified] yo pleasant man who has a history of hypertension, type 2 diabetes, congestive heart failure, osteoarthritis of the knee. He is status post left total knee replacement about 3 years ago. His course was complicated with multiorgan failure including acute kidney injury and need for dialysis. Back then patient has received a total of 3-1/2-month of dialysis and has since improved his kidney function. He continues to have chronic kidney disease now stage IV with baseline GFR around 18. He follows with Dr. Majo Gutierrez at the renal clinic. Patient is currently admitted with congestive heart failure exacerbation. He required endotracheal intubation mechanical ventilation. He was extubated on November 24. Renal service was consulted to manage his chronic kidney disease. Serum creatinine is 3.3 with GFR of 18. Patient denies any dysuria. He denies NSAIDs abuse. He is now less dyspneic.     Allergies:  Eliquis [apixaban] and Promethazine hcl    Medicines:  Current Facility-Administered Medications   Medication Dose Route Frequency Provider Last Rate Last Admin    amiodarone (CORDARONE) tablet 200 mg  200 mg Oral Daily Dian Reed MD   200 mg at 11/26/21 0940    aspirin EC tablet 81 mg  81 mg Oral Daily Dian Reed MD   81 mg at 11/26/21 0940    atorvastatin (LIPITOR) tablet 20 mg  20 mg Oral Daily Dian Reed MD   20 mg at 11/26/21 0940    [Held by provider] bumetanide (BUMEX) tablet 1 mg  1 mg Oral Daily Dian Reed MD        ferrous sulfate (IRON 325) tablet 325 mg  325 mg Oral BID  Olesya Mann MD   325 mg at 46/81/12 0918    folic acid (FOLVITE) tablet 1 mg  1 mg Oral Daily Olesya Mann MD   1 mg at 11/26/21 0940    metoclopramide (REGLAN) tablet 5 mg  5 mg Oral TID Olesya Mann MD   5 mg at 11/26/21 0941    metoprolol succinate (TOPROL XL) extended release tablet 100 mg  100 mg Oral Daily Olesya Mann MD   100 mg at 11/26/21 0940    NIFEdipine (PROCARDIA XL) extended release tablet 60 mg  60 mg Oral Daily Olesya Mann MD   60 mg at 11/26/21 0940    tamsulosin (FLOMAX) capsule 0.4 mg  0.4 mg Oral BID Olesya Mann MD   0.4 mg at 11/26/21 0940    trospium (SANCTURA) tablet 20 mg  20 mg Oral Daily Olesya Mann MD   20 mg at 11/26/21 0940    insulin lispro (HUMALOG) injection vial 0-6 Units  0-6 Units SubCUTAneous TID  Olesya Mann MD   2 Units at 11/26/21 2956    insulin lispro (HUMALOG) injection vial 0-3 Units  0-3 Units SubCUTAneous Nightly Olesya Mann MD   2 Units at 11/25/21 2108    ondansetron (ZOFRAN-ODT) disintegrating tablet 4 mg  4 mg Oral Q8H PRN Olesya Mann MD        Or    ondansetron [facility]) injection 4 mg  4 mg IntraVENous Q6H PRN Olesya Mann MD        acetaminophen (TYLENOL) tablet 650 mg  650 mg Oral Q6H PRN Olesya Mann MD        Or    acetaminophen (TYLENOL) suppository 650 mg  650 mg Rectal Q6H PRN Olesya Mann MD        enoxaparin (LOVENOX) injection 30 mg  30 mg SubCUTAneous Q24H Olesya Mann MD   30 mg at 11/25/21 2054    furosemide (LASIX) injection 40 mg  40 mg IntraVENous BID Olesya Mann MD   40 mg at 11/26/21 0940    ipratropium-albuterol (DUONEB) nebulizer solution 1 ampule  1 ampule Inhalation Q4H Olesya Mann MD   1 ampule at 11/26/21 0957    methylPREDNISolone sodium (SOLU-MEDROL) injection 40 mg  40 mg IntraVENous Q8H Olesya Mann MD   40 mg at 11/26/21 0952    doxycycline (VIBRAMYCIN) 100 mg in dextrose 5 % 100 mL IVPB  100 mg IntraVENous Q12H Olesya Mann MD   Paused at 11/26/21 1037    glucose (GLUTOSE) 40 % oral gel 15 g  15 g Oral PRN Niocle Medrano MD        dextrose 50 % IV solution  12.5 g IntraVENous PRN Nicole Medrano MD        glucagon (rDNA) injection 1 mg  1 mg IntraMUSCular PRN Nicole Medrano MD        dextrose 5 % solution  100 mL/hr IntraVENous PRN Nicole Medrano MD        famotidine (PEPCID) injection 20 mg  20 mg IntraVENous Q24H Nicole Medrano MD   20 mg at 11/25/21 2318    thiamine mononitrate tablet 100 mg  100 mg Oral Daily Nicole Medrano MD   100 mg at 11/26/21 0941    multivitamin 1 tablet  1 tablet Oral Daily Nicole Medrano MD   1 tablet at 11/26/21 0941    LORazepam (ATIVAN) tablet 1 mg  1 mg Oral Q1H PRN Nicole Medrano MD        Or    LORazepam (ATIVAN) injection 1 mg  1 mg IntraVENous Q1H PRN Nicole Medrano MD        Or    LORazepam (ATIVAN) tablet 2 mg  2 mg Oral Q1H PRN Nicole Medrano MD        Or    LORazepam (ATIVAN) injection 2 mg  2 mg IntraVENous Q1H PRN Nicole Medrano MD        Or    LORazepam (ATIVAN) tablet 3 mg  3 mg Oral Q1H PRN Nicole Medrano MD        Or    LORazepam (ATIVAN) injection 3 mg  3 mg IntraVENous Q1H PRN Nicole Medrano MD        Or    LORazepam (ATIVAN) tablet 4 mg  4 mg Oral Q1H PRN Nicole Medrano MD        Or    LORazepam (ATIVAN) injection 4 mg  4 mg IntraVENous Q1H PRN Nicole Medrano MD           Past Medical History:  Past Medical History:   Diagnosis Date    Acute liver failure without hepatic coma 10/23/2018    Back pain     \"with tired legs as a result\"    Bladder cancer ([facility] 12/19/2018    Blood circulation, collateral     Carotid arterial disease ([facility]     recent surgery    CKD (chronic kidney disease), 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/2018    COPD with acute lower respiratory infection ([facility] 02/24/2021    Epigastric discomfort     GERD (gastroesophageal reflux disease)     Hyperlipidemia     Hypertension     Hypertension     Palliative care patient 10/23/2018    Pneumonia due to infectious organism 11/06/2018  Primary osteoarthritis of left knee 10/14/2018    PVD (peripheral vascular disease) (HCC)     Tremor     Tremor on Right side x 1-2 weeks per stepdaughter    Type 2 diabetes mellitus with complication, without long-term current use of insulin ([facility] 01/21/2021       Past Surgical History:  Past Surgical History:   Procedure Laterality Date    BACK SURGERY      CARDIAC CATHETERIZATION  03/23/2021    100% RCA    COLONOSCOPY  2007?  CYSTOSCOPY Bilateral 12/19/2018    CYSTOSCOPY, BIOSPY FULGURATION OF BLADDER TUMOR POSSIBLE TURBT, RETROGRADE PYELOGRAM performed by Raffi Aguilera MD at [address] COLONOSCOPY FLX DX W/COLLJ SPEC WHEN PFRMD N/A 09/11/2017    Dr Yue Melara internal hemorrhoids, diverticular disease-HP-No recall (age)   Kelley Coral MI REVISE MEDIAN N/CARPAL TUNNEL SURG Left 07/18/2018    OPEN CARPAL TUNNEL RELEASE performed by Madeleine Beltran MD at [address] Left 08/28/2018    LEFT CAROTID ENDARTERECTOMY WITH VEIN PATCH ANGIOPLASTY AND COMPLETION ANGIOGRAM performed by Arnaldo Rboin MD at [place] [id number] Left 10/15/2018    LEFT COMPLEX TOTAL KNEE ARTHROPLASTY performed by Madeleine Beltran MD at [address] [facility] ENDOSCOPY N/A 11/18/2021    Dr Johnson Husbands, Sub prep lg amount of solid and semisolid food/Medication in stomach body/antrum/pylorus, stomach lumen appeared to distended w air insufflation and collapse upon suction,   inadequate exam sugg of gastroparesis, repeat in 10-14 days    VASCULAR SURGERY  04/21/2015    Haris BESS Ultrasound guided access of left common femoral artery. Aortogram.Diagnostic right lower extremity arteriogram.Radiologic supervision and interpretation.  VASCULAR SURGERY  01/13/2015    Haris Carrasquillo M.D Atherectomy,angioplasty,and stenting of left superficial femoral artery.     VASCULAR SURGERY  03/11/2014    Haris Carrasquillo M.D. Ultrasound-guided access of right common femoral artery. Aortogram.Left lower extremity arteriogram.Atherectomy and angioplasty of left superficial femoral artery. Radiologic supervision and interpretation.  VASCULAR SURGERY  2013    Yony BESS Aortogram.Multistation arteriogram right lower extremity. Laser atherectomy and angioplasty of right superficial femoral artery. Selective catheterization of right tibioperoneal trunk. Angioplasty of peroneal artery and tibioperoneal trunk.  VASCULAR SURGERY  10/30/2018    [facility]. Ultrasound guided cannulation of right internal vein. Placement of right internal jugular vein tunneled dialysis catheter bard equistream xk 23cm tip to cuff    VASCULAR SURGERY  2018    SJS. Removal of tunneled dilaysis catheter right internal jugular vein. Family History  Family History   Problem Relation Age of Onset    Colon Cancer Father     Diabetes Brother     Colon Polyps Neg Hx     Liver Cancer Neg Hx     Liver Disease Neg Hx     Esophageal Cancer Neg Hx     Rectal Cancer Neg Hx     Stomach Cancer Neg Hx        Social History  Social History     Socioeconomic History    Marital status:      Spouse name: Not on file    Number of children: Not on file    Years of education: Not on file    Highest education level: Not on file   Occupational History    Not on file   Tobacco Use    Smoking status: Former Smoker     Quit date: 6/3/2003     Years since quittin.4    Smokeless tobacco: Never Used   Vaping Use    Vaping Use: Never used   Substance and Sexual Activity    Alcohol use:  Yes     Alcohol/week: 12.0 standard drinks     Types: 12 Glasses of wine per week     Comment: 2 glasses of wine every night    Drug use: No    Sexual activity: Yes     Partners: Female   Other Topics Concern    Not on file   Social History Narrative    Not on file     Social Determinants of Health     Financial Resource Strain:     Difficulty of Paying Living Expenses: Not on file   Food Insecurity:     Worried About Running Out of Food in the Last Year: Not on file    Ankit of Food in the Last Year: Not on file   Transportation Needs:     Lack of Transportation (Medical): Not on file    Lack of Transportation (Non-Medical): Not on file   Physical Activity:     Days of Exercise per Week: Not on file    Minutes of Exercise per Session: Not on file   Stress:     Feeling of Stress : Not on file   Social Connections:     Frequency of Communication with Friends and Family: Not on file    Frequency of Social Gatherings with Friends and Family: Not on file    Attends Evangelical Services: Not on file    Active Member of [address] [place] [address] Prelert or Organizations: Not on file    Attends Club or Organization Meetings: Not on file    Marital Status: Not on file   Intimate Partner Violence:     Fear of Current or Ex-Partner: Not on file    Emotionally Abused: Not on file    Physically Abused: Not on file    Sexually Abused: Not on file   Housing Stability:     Unable to Pay for Housing in the Last Year: Not on file    Number of [place] in the Last Year: Not on file    Unstable Housing in the Last Year: Not on file         Review of Systems:  History obtained from chart review and the patient  General ROS: No fever or chills  Respiratory ROS: No cough,+ shortness of breath, -wheezing  Cardiovascular ROS: no chest pain but dyspnea on exertion  Gastrointestinal ROS: No abdominal pain or melena  Genito-Urinary ROS: No dysuria or hematuria  Musculoskeletal ROS: No joint pain or swelling       Objective:  Blood pressure (!) 118/52, pulse 63, temperature 97.5 °F (36.4 °C), resp. rate 22, height 6' (1.829 m), weight 235 lb (106.6 kg), SpO2 99 %.     Intake/Output Summary (Last 24 hours) at 11/26/2021 1304  Last data filed at 11/26/2021 0557  Gross per 24 hour   Intake 1250 ml   Output 1525 ml   Net -275 ml       General: alert and oriented x3   Chest: Bilateral air entry with scattered rales  CVS: regular rate and rhythm  Abdominal: soft, nontender, normal bowel sounds  Extremities: no cyanosis or edema  Skin: warm and dry without rash    Labs:  BMP:   Recent Labs     11/23/21  1655 11/24/21  0436 11/24/21  1105 11/25/21  0409 11/26/21  0826   NA  --  138  --  136 135*   K   < > 4.1 3.5 4.0 3.7   CL  --  98  --  97* 93*   CO2  --  24  --  23 25   BUN  --  34*  --  41* 49*   CREATININE  --  3.0*  --  3.3* 3.3*   CALCIUM  --  8.9  --  8.6* 8.9    < > = values in this interval not displayed. CBC:   Recent Labs     11/23/21  1627 11/25/21  0409 11/26/21  0826   WBC 14.2* 11.6* 11.3*   HGB 12.5* 10.1* 10.4*   HCT 41.5* 33.2* 33.0*   MCV 94.1* 95.1* 91.2    135 158     LIVER PROFILE:   Recent Labs     11/24/21 0436 11/25/21  0409 11/26/21  0826   AST 10 10 11   ALT 12 11 14   BILITOT 0.8 0.7 0.5   ALKPHOS 120 101 103     PT/INR:   Recent Labs     11/23/21 1627   PROTIME 15.0*   INR 1.16     APTT:   Recent Labs     11/23/21 1627   APTT 31.0     BNP:  No results for input(s): BNP in the last 72 hours. Ionized Calcium:No results for input(s): IONCA in the last 72 hours. Magnesium:  Recent Labs     11/24/21  0436 11/25/21  0409 11/26/21  0826   MG 2.1 1.9 2.2     Phosphorus:No results for input(s): PHOS in the last 72 hours. HgbA1C: No results for input(s): LABA1C in the last 72 hours. Hepatic:   Recent Labs     11/24/21  0436 11/25/21  0409 11/26/21  0826   ALKPHOS 120 101 103   ALT 12 11 14   AST 10 10 11   PROT 6.0* 5.8* 6.0*   BILITOT 0.8 0.7 0.5   LABALBU 3.9 3.5 3.9     Lactic Acid: No results for input(s): LACTA in the last 72 hours. Troponin:   Recent Labs     11/26/21  0826   CKTOTAL 16*     ABGs: No results for input(s): PH, PCO2, PO2, HCO3, O2SAT in the last 72 hours. CRP:  No results for input(s): CRP in the last 72 hours. Sed Rate:  No results for input(s): SEDRATE in the last 72 hours. Cultures:   No results for input(s): CULTURE in the last 72 hours.     Radiology reports as per the Radiologist  Radiology: XR CHEST PORTABLE    Result Date: 11/23/2021  EXAMINATION: XR CHEST PORTABLE 11/23/2021 7:05 PM HISTORY: ET tube placement Single view the chest obtained compared prior exam the same date. Bilateral parahilar opacities are present consistent with pulmonary edema. Cardiac silhouettes mildly enlarged. Endotracheal tube is satisfactorily positioned. Pacing device is present in the soft tissues of the left chest.    Mild cardiomegaly and changes of parahilar interstitial pulmonary edema unchanged in one hour. 2. Endotracheal tube satisfactorily positioned Signed by Dr Latha Up    XR CHEST PORTABLE    Result Date: 11/23/2021  EXAM: XR CHEST PORTABLE -- 11/23/2021 4:43 PM HISTORY: 81 years, Male, dyspnea COMPARISON: 8/26/2021 TECHNIQUE:  2 images. Frontal view of the chest. FINDINGS:  No pneumothorax. Bilateral perihilar and interstitial opacities. Possible layering left pleural effusion. No large right pleural effusion. Cardiac mediastinal silhouette similar to prior. Calcified aortic atherosclerosis. No acute bony finding. Left chest cardiac pulse generator with 2 grossly intact leads. 1. Findings most suggestive of pulmonary edema. Signed by Dr Rocael Lawrence   1. Chronic kidney disease stage IV  2. Hypertensive nephrosclerosis  3. Congestive heart failure with acute exacerbation  4. Previous episode of ANA  5. Osteoarthritis of the knee  6. Type 2 diabetes with kidney disease  7. Acute respiratory failure    Plan:  Renal function is about stable. No dialysis is needed. Avoid hypotension nephrotoxins. Continue diuretics.

## 2021-11-26 NOTE — PROGRESS NOTES
Physical Therapy    Facility/Department: [facility] ONCOLOGY UNIT  Initial Assessment    NAME: Roberta Hermosillo  : 1940  MRN: [id number]    Date of Service: 2021    Discharge Recommendations:  Continue to assess pending progress        Assessment   Body structures, Functions, Activity limitations: Decreased functional mobility ; Decreased endurance  Assessment: pt WOULD BENEFIT FROM SKILLED PT IN THIS SETTING TO PROGRESS HIS MOBILITY AND GENERAL STRENGTH  Prognosis: Good  Decision Making: Low Complexity  PT Education: Goals; General Safety; Transfer Training; Plan of Care; PT Role; Functional Mobility Training  REQUIRES PT FOLLOW UP: Yes  Activity Tolerance  Activity Tolerance: Patient Tolerated treatment well       Patient Diagnosis(es): The primary encounter diagnosis was Acute on chronic respiratory failure with hypoxia and hypercapnia ([facility] [address] A diagnosis of Acute on chronic congestive heart failure, unspecified heart failure type [facility]) was also pertinent to this visit. has a past medical history of Acute liver failure without hepatic coma, Back pain, Bladder cancer ([address] Blood circulation, collateral, Carotid arterial disease ([address] CKD (chronic kidney disease), stage II, COPD with acute lower respiratory infection ([address] Epigastric discomfort, GERD (gastroesophageal reflux disease), Hyperlipidemia, Hypertension, Hypertension, Palliative care patient, Pneumonia due to infectious organism, Primary osteoarthritis of left knee, PVD (peripheral vascular disease) ([address] Tremor, and Type 2 diabetes mellitus with complication, without long-term current use of insulin ([address] has a past surgical history that includes back surgery;  Tonsillectomy and adenoidectomy; Colonoscopy (?); pr colonoscopy flx dx w/collj spec when pfrmd (N/A, 2017); pr revise median n/carpal tunnel surg (Left, 2018); pr thromboendartectmy neck,neck incis (Left, 2018); vascular surgery (2015); vascular surgery (01/13/2015); vascular surgery (03/11/2014); vascular surgery (01/18/2013); pr total knee arthroplasty (Left, 10/15/2018); vascular surgery (10/30/2018); vascular surgery (12/17/2018); Cystoscopy (Bilateral, 12/19/2018); Cardiac catheterization (03/23/2021); and Upper gastrointestinal endoscopy (N/A, 11/18/2021).     Restrictions     Vision/Hearing        Subjective  General  Patient assessed for rehabilitation services?: Yes  Family / Caregiver Present: No  Diagnosis: CHF, WAS INTUBATED 11-21 AND EXTUBATED 11-24  Follows Commands: Within Functional Limits  General Comment  Comments: ON 2 L 02  Subjective  Subjective: pt REPORTS HE CANNOT AMBULATE TODAY DUE TO ONGOING ATTEMPTS TO HAVE A BOWEL MOVEMENT BUT IS HOPING TO BE ABLE TO AMB 11-27 IN ORDER TO GAIN STRENGTH IN HOPES OF D/C HOME WHEN MEDICALLY STABLE  Pain Screening  Patient Currently in Pain: Yes  Pain Assessment  Pain Assessment: Faces  Zapata-Baker Pain Rating: Hurts a little bit; Hurts little more  Patient's Stated Pain Goal: No pain  Pain Type: Acute pain  Pain Location: Abdomen  Pain Descriptors: Discomfort  Pain Onset: On-going  Clinical Progression: Not changed  Functional Pain Assessment: Prevents or interferes some active activities and ADLs  Non-Pharmaceutical Pain Intervention(s): Repositioned  Response to Pain Intervention: Patient Satisfied  Vital Signs  Patient Currently in Pain: Yes  Pre Treatment Pain Screening  Intervention List: Patient able to continue with treatment    Orientation     Social/Functional History  Social/Functional History  Lives With: Spouse  Type of Home: House  Home Access: Stairs to enter with rails  Entrance Stairs - Number of Steps: 5  Bathroom Shower/Tub: Walk-in shower  Bathroom Toilet: Handicap height  Bathroom Equipment: Shower chair  Home Equipment: Rolling walker  ADL Assistance: Independent  Ambulation Assistance: Independent  Transfer Assistance: Independent  Additional Comments: STATES HE USES A RW FIRST THING IN THE MORNING  Cognition        Objective          AROM RLE (degrees)  RLE AROM: WFL  AROM LLE (degrees)  LLE AROM : WFL  Strength Other  Other: GROSSLY ANTIGRAVITY        Bed mobility  Supine to Sit: Stand by assistance  Sit to Supine: Stand by assistance  Scooting: Stand by assistance  Transfers  Sit to Stand: Contact guard assistance  Stand to sit: Contact guard assistance  Bed to Chair: Contact guard assistance  Stand Pivot Transfers: Contact guard assistance  Ambulation  Ambulation?:  (DECLINED AMBULATION TODAY)     Balance  Comments: ABLE TO STAND, HOLDING TO BEDRAIL WITH ONE HAND AND THEN ABLE TO STAND WITH SBA FOR CLEAN UP BEFORE PERFORMING A STAND STEP TF TO BED        Plan   Plan  Times per week: 5-6  Plan weeks: 2  Current Treatment Recommendations: Strengthening, Functional Mobility Training, Transfer Training, Gait Training, Endurance Training, Patient/Caregiver Education & Training, Safety Education & Training  Plan Comment: 02 AS INDICATED  Safety Devices  Type of devices: Call light within reach, Bed alarm in place, Left in bed       Goals  Short term goals  Time Frame for Short term goals: 2 WKS  Short term goal 1:  FT WITH PHUONG MCGOWAN       Therapy Time   Individual Concurrent Group Co-treatment   Time In           Time Out           Minutes                   Erin Kilgore PT     Electronically signed by Erni Kilgore PT on 11/26/2021 at 2:48 PM

## 2021-11-26 NOTE — PROGRESS NOTES
Speech Language Pathology  Facility/Department: [facility] 4 ONCOLOGY UNIT   CLINICAL BEDSIDE SWALLOW EVALUATION    NAME: Hedy Cohen  : 1940  MRN: [id number]    ADMISSION DATE: 2021  ADMITTING DIAGNOSIS: has Diverticulitis of large intestine with perforation without bleeding; Generalized abdominal pain; Colonic diverticular abscess; Bilateral carotid artery stenosis; Atherosclerosis of native artery of both lower extremities with intermittent claudication ([address] Carotid stenosis, asymptomatic, left; Primary osteoarthritis of left knee; Arthritis of knee; Essential hypertension; Pure hypercholesterolemia; Slow transit constipation; Iron deficiency anemia; GERD (gastroesophageal reflux disease); Acute liver failure without hepatic coma; CHF (congestive heart failure) ([address] Bilateral pleural effusion; Palliative care patient; Shock liver; Acute renal failure (HCC); BPH associated with nocturia; Bleeding diathesis ([address] Epistaxis; Acute blood loss anemia; Melena; Thrombocytopenia ([address] Hypocalcemia; Pneumonia due to infectious organism; Bladder cancer ([address] Postoperative pain; History of bladder cancer; Severe sepsis ([address] Chronic respiratory failure with hypoxia and hypercapnia ([address] Acute on chronic kidney failure ([address] Hypoxemia; Metabolic acidosis; Acidosis, metabolic, with respiratory acidosis; Acute respiratory failure ([address] Type 2 diabetes mellitus with complication, without long-term current use of insulin ([address] Weakness; Other dysphagia; Chronic midline low back pain without sciatica; COPD with acute lower respiratory infection ([address] Chronic kidney disease; Recurrent pneumonia; Nonsustained ventricular tachycardia ([address] Atrial fibrillation ([address] Vitamin D deficiency; Anemia; Alcohol use; Pacemaker; Class 1 obesity in adult; GREGORIO treated with BiPAP; Chronic diastolic congestive heart failure ([address] SOB (shortness of breath); Hypoglycemia;  Acute kidney injury superimposed on CKD ([address] Constipation; Acute decompensated heart failure (HCC); and COPD with exacerbation ([address] on their problem list.      Recent Chest Xray/CT of Chest:   Narrative   EXAMINATION: XR CHEST PORTABLE 11/23/2021 7:05 PM   HISTORY: ET tube placement   Single view the chest obtained compared prior exam the same date. Bilateral parahilar opacities are present consistent with pulmonary   edema. Cardiac silhouettes mildly enlarged. Endotracheal tube is satisfactorily positioned. Pacing device is   present in the soft tissues of the left chest.       Impression   Mild cardiomegaly and changes of parahilar interstitial   pulmonary edema unchanged in one hour. 2. Endotracheal tube satisfactorily positioned   Signed by Dr Beulah Minaya         Date of Eval: 11/26/2021  Evaluating Therapist: HALINA Fulton    Current Diet level:  Current Liquid Diet : Full        Pain:  Pain Assessment  Pain Assessment: 0-10  Pain Level: 0  Patient's Stated Pain Goal: No pain  Response to Pain Intervention: Asleep with RR greater than 10  RASS Score: Moderate Sedation - Patient has any movement in response to voice but no eye contact    Reason for Referral  Alejandro Goldsmith was referred for a bedside swallow evaluation to assess the efficiency of his swallow function, identify signs and symptoms of aspiration and make recommendations regarding safe dietary consistencies, effective compensatory strategies, and safe eating environment. Impression   Pt presented with functional oral and pharyngeal phase of swallowing to consume regular diet texture with thin liquids. At the bedside, pt demonstrated slightly prolonged mastication process, but independently utilized compensatory swallow strategies including liquid wash for oral stasis. At this time, if pt ok for solids, SLP recommends pt trial regular diet texture with thin liquids. Meds whole with water.       Dysphagia Diagnosis: Swallow function appears grossly intact    Treatment Plan  Requires SLP Intervention: Yes  Duration/Frequency of Treatment: 1x/day, 2-3 days          Recommended Diet and Intervention  Diet Solids Recommendation: Regular  Liquid Consistency Recommendation: Thin  Recommended Form of Meds: Whole with water  Recommendations: Dysphagia treatment  Therapeutic Interventions: Patient/Family education; Diet tolerance monitoring    Compensatory Swallowing Strategies  Compensatory Swallowing Strategies: Alternate solids and liquids; Upright as possible for all oral intake    Treatment/Goals  Short-term Goals  Timeframe for Short-term Goals: 2-3 days  Goal 1: Pt tolerate regular diet texture with thin liquids without overt s/s of aspiration. Goal 2: Pt/family/staff follow safe swallow and aspiration precautions 100% of PO intake. Long-term Goals  Goal 1: Pt tolerate safer and least restrictive diet without overt s/s of aspiration. General  Chart Reviewed: Yes  Subjective  Subjective: Pt alert in bed. Pt reported he is [place], but demonstrated understanding with all questions presented. Pt requested solid foods. Behavior/Cognition: Alert; Cooperative; Pleasant mood  Communication Observation: Functional  Follows Directions: Simple  Dentition: Dentures bottom  Patient Positioning: Upright in bed  Baseline Vocal Quality: Normal  Consistencies Administered: Reg solid; Dysphagia Soft and Bite-Sized (Dysphagia III); Dysphagia Minced and Moist (Dysphagia II); Dysphagia Pureed (Dysphagia I); Thin - straw; Thin - cup           Vision/Hearing  Hearing  Hearing: Exceptions to [facility]  Hearing Exceptions: Hard of hearing/hearing concerns    Oral Motor Deficits  Oral/Motor  Oral Motor: Within functional limits    Oral Phase Dysfunction  Oral Phase  Oral Phase: WFL  Oral Phase  Oral Phase - Comment: Pt presented with functional oral motor movements for lingual and labial coordination and bolus control.  Pt demonstrated slightly prolonged mastication process with regular solid texture, which is suspected secondary to dry mouth. Pt had oral stasis in teeth, which he cleared with a liquid wash. Indicators of Pharyngeal Phase Dysfunction   Pharyngeal Phase  Pharyngeal Phase: WFL  Pharyngeal Phase   Pharyngeal: Pt demonstrated no deficits with pharyngeal phase of swallowing. Pt demonstrated timely swallow response. No overt s/s of aspiration observed at the bedside. Prognosis  Individuals consulted  Consulted and agree with results and recommendations: Patient; RN    Education  Patient Education: Education on diet and liquids; safe swallow and aspiration precautions  Patient Education Response: Demonstrated understanding              At this time, if pt ok for solids, SLP recommends pt trial regular diet texture with thin liquids. Meds whole with water.     HALINA Whatley  11/26/2021 11:28 AM    Electronically signed by HALINA Whatley on 11/26/21 at 11:30 AM CST

## 2021-11-26 NOTE — CARE COORDINATION
Initial CM Assessment    Initial Assessment Completed at bedside with:    [x]   Patient  []   Family/Caregiver/Guardian   []   Other:      Patient Contact Information:  [phone] (home)   Above information verified? [x]   Yes  []   No    ADLS:    Uses a walker in the mornings   Support System:    spouse and children   Plan to return to current housing:   [x]   Yes  []   No    Transportation plan for Discharge:  Children     Do you have any unmet social needs that would keep you from returning home safely:  []   Yes  [x]   No              Unmet Social Needs Notes:       Had [address] [facility] prior to admission:    []   Yes  [x]   No      Currently ACTIVE with Home Health CARE:    []   Yes  []   No  [x]   Interested at discharge  [facility]:      Current PCP:  Nhi Zavaleta MD  PCP verified? [x]   Yes  []   No    Have you been vaccinated for COVID-19 (SARS-CoV-2)? [x]   Yes  []   No                   If so, when? Which :  [x]   [email or web address]  []   Moderna  []   [employer]  []   Other:       Pharmacy:    [facility] [address] [place] [address], [address] [place] [place] [address] [phone]  [address] [address] [address]  Phone: [phone] Fax: [phone]    [address] [place] [address] Dr [facility] [phone] - V Denia Pelayo [address] [address]  Phone: [phone] Fax: [phone]    Prefer to use Meds to Bed? []   Yes  [x]   No  Potential assistance purchasing medications?      []   Yes  [x]   No    Active with HD/PD prior to admission:           []   Yes  [x]   No  HD Center:       Financial:  Payor: Linda Rios / Plan: RAILROAD MEDICARE / Product Type: *No Product type* /     Pre-Cert required for SNF:   []   Yes  [x]   No    Patient Deficits:  []   Yes   [x]   No    If yes:  []   Confusion/Memory  []   Visual  []   Motor/Sensory         []   Right arm         []   Right leg         []   Left arm         []   Left leg  []   Language/Speech         []   Aphasia         []   Dysarthria         []   Swallow         [place] Coma Scale  Eye Opening: Spontaneous  Best Verbal Response: Oriented  Best Motor Response: Obeys commands  [place]ago Coma Scale Score: 15    Patient Deficit Notes: Additional CM/SW Notes:   Pt reports that he uses a walker early in the mornings, then when he gets up and around he does not need it. He reports his medications as affordable. He has had [facility] before. He has been to [facility] in the past following a knee replacement. He is eager to get up with therapy to see how he does. His only complaint at this time is that he has not had a BM in a few days.      Stacy  and/or his family were provided with choice of provider:  [x]   Yes   []   No      Patient Admission Status:   Inpatient [101]    [facility]

## 2021-11-26 NOTE — PROGRESS NOTES
Occupational Therapy   Occupational Therapy Initial Assessment  Date: 2021   Patient Name: Lorena Mcneal  MRN: [id number]     : 1940    Date of Service: 2021    Discharge Recommendations:          Assessment   Performance deficits / Impairments: Decreased functional mobility ; Decreased ADL status; Decreased endurance; Decreased balance  Assessment: Will progress as tolerated  Treatment Diagnosis: CHF  Prognosis: Good  REQUIRES OT FOLLOW UP: Yes  Activity Tolerance  Activity Tolerance: Patient Tolerated treatment well  Activity Tolerance: Currently on 2 liters O2           Patient Diagnosis(es): The primary encounter diagnosis was Acute on chronic respiratory failure with hypoxia and hypercapnia ([facility] A diagnosis of Acute on chronic congestive heart failure, unspecified heart failure type [facility]) was also pertinent to this visit. has a past medical history of Acute liver failure without hepatic coma, Back pain, Bladder cancer ([address] Blood circulation, collateral, Carotid arterial disease ([address] CKD (chronic kidney disease), stage II, COPD with acute lower respiratory infection ([address] Epigastric discomfort, GERD (gastroesophageal reflux disease), Hyperlipidemia, Hypertension, Hypertension, Palliative care patient, Pneumonia due to infectious organism, Primary osteoarthritis of left knee, PVD (peripheral vascular disease) ([address] Tremor, and Type 2 diabetes mellitus with complication, without long-term current use of insulin ([address] has a past surgical history that includes back surgery;  Tonsillectomy and adenoidectomy; Colonoscopy (?); pr colonoscopy flx dx w/collj spec when pfrmd (N/A, 2017); pr revise median n/carpal tunnel surg (Left, 2018); pr thromboendartectmy neck,neck incis (Left, 2018); vascular surgery (2015); vascular surgery (2015); vascular surgery (2014); vascular surgery (2013); pr total knee arthroplasty (Left, 10/15/2018); vascular surgery (10/30/2018); vascular surgery (12/17/2018); Cystoscopy (Bilateral, 12/19/2018); Cardiac catheterization (03/23/2021); and Upper gastrointestinal endoscopy (N/A, 11/18/2021). Treatment Diagnosis: CHF      Restrictions       Subjective   General  Chart Reviewed: Yes  Patient assessed for rehabilitation services?: Yes  Family / Caregiver Present: No  Diagnosis: CHF  Patient Currently in Pain: Yes  Pain Assessment  Pain Assessment: Faces  Zapata-Baker Pain Rating: Hurts a little bit  Pain Type: Acute pain  Pain Location: Abdomen  Pain Descriptors: Aching  Pain Frequency: Intermittent  Pain Onset: Gradual  Clinical Progression: Gradually worsening  Functional Pain Assessment: Prevents or interferes some active activities and ADLs  Non-Pharmaceutical Pain Intervention(s): Rest  Response to Pain Intervention: Patient Satisfied  Vital Signs  Pulse: 63  BP: (!) 118/52  Patient Currently in Pain: Yes  Social/Functional History  Social/Functional History  Lives With: Spouse  Type of Home: House  Home Access: Stairs to enter with rails  Entrance Stairs - Number of Steps: 5  Bathroom Shower/Tub: Walk-in shower  Bathroom Toilet: Handicap height  Bathroom Equipment: Shower chair  Home Equipment: Rolling walker  ADL Assistance: Independent  Ambulation Assistance: Independent  Transfer Assistance: Independent  Additional Comments: STATES HE USES A RW FIRST THING IN THE MORNING       Objective   Vision: Within Functional Limits  Hearing: Exceptions to [facility]  Hearing Exceptions: Hard of hearing/hearing concerns    Orientation  Overall Orientation Status: Within Functional Limits        ADL  Feeding: Setup  Grooming: Supervision  UE Bathing: Supervision  LE Bathing: Moderate assistance  UE Dressing: Supervision  LE Dressing: Moderate assistance  Toileting:  Moderate assistance           Transfers  Stand Step Transfers: Contact guard assistance  Sit to stand: Contact guard assistance  Transfer Comments: [facility] from [facility] to bed Cognition  Overall Cognitive Status: WFL                 LUE AROM (degrees)  LUE AROM : WFL  RUE AROM (degrees)  RUE AROM : WFL  LUE Strength  Gross LUE Strength: WFL  RUE Strength  Gross RUE Strength: WFL                   Plan   Plan  Times per week: 3-5x/week  Times per day: Daily    G-Code     OutComes Score                                                  AM-PAC Score             Goals  Short term goals  Time Frame for Short term goals: 1 week  Short term goal 1: Modified I with toilet tfers  Short term goal 2: Supervision with toilet hygiene and clothing mgmt  Short term goal 3: Marleny with seated LB dsg  Long term goals  Long term goal 1: Return to PLOF       Therapy Time   Individual Concurrent Group Co-treatment   Time In           Time Out           Minutes                   Howie Carrasquillo, OT

## 2021-11-26 NOTE — CONSULTS
[facility]) Cardiology   Consult Note       Requesting MD:  Mello Knight MD   Admit Status:  Inpatient [101]       Patient:    Ludivina Solorio  [id number]  1940         Chief Complaint: Respiratory failure with congestive heart failure  Chief Complaint   Patient presents with    Shortness of Breath     HPI: Patient is a [de-identified] y.o. male was admitted 2 days ago for respiratory failure. He was intubated for day and a half. He has been extubated and now very comfortable. He is a poor historian. 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 March 2021 echocardiogram showed preserved left ventricular systolic wall motion ejection fraction was 65%. There was grade 2 diastolic dysfunction. He has been a heavy smoker and quit 2002. In a good day, patient cannot walk that well because of chronic back pain. He is known to have hypertension, diabetes and hyperlipidemia. Somewhere in the chart this was mentioning of ventricular tachycardia. A pacemaker was inserted many years ago and battery has been changed once.   3 years ago after left knee surgery, this was complicated by multiorgan organ failure including renal failure on dialysis for 3-1/2 months  Review of Systems:  HENNT: normal  PULMONARY: See history of present illness  CVS:see history of present illness  ABD: denies any abdominal pain  PERIPHERL: normal  MSK: no swelling of the lower extremities  CNS: Denies any dizziness, syncopal episode or history of stroke  Renal: History of present illness    Cardiac Specific Data:  Specialty Problems        Cardiology Problems    Nonsustained ventricular tachycardia (HCC)        Atherosclerosis of native artery of both lower extremities with intermittent claudication (HCC)        Bilateral carotid artery stenosis        Carotid stenosis, asymptomatic, left        Essential hypertension        Pure hypercholesterolemia CHF (congestive heart failure) (HCC)        Atrial fibrillation (HCC)        Chronic diastolic congestive heart failure ([address]        * (Principal) Acute decompensated heart failure ([address]              Past Medical History:  Past Medical History:   Diagnosis Date    Acute liver failure without hepatic coma 10/23/2018    Back pain     \"with tired legs as a result\"    Bladder cancer ([address] 12/19/2018    Blood circulation, collateral     Carotid arterial disease (HCC)     recent surgery    CKD (chronic kidney disease), 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/2018    COPD with acute lower respiratory infection ([address] 02/24/2021    Epigastric discomfort     GERD (gastroesophageal reflux disease)     Hyperlipidemia     Hypertension     Hypertension     Palliative care patient 10/23/2018    Pneumonia due to infectious organism 11/06/2018    Primary osteoarthritis of left knee 10/14/2018    PVD (peripheral vascular disease) (HCC)     Tremor     Tremor on Right side x 1-2 weeks per stepdaughter    Type 2 diabetes mellitus with complication, without long-term current use of insulin ([facility] 01/21/2021        Past Surgical History:  Past Surgical History:   Procedure Laterality Date    BACK SURGERY      CARDIAC CATHETERIZATION  03/23/2021    100% RCA    COLONOSCOPY  2007?     CYSTOSCOPY Bilateral 12/19/2018    CYSTOSCOPY, BIOSPY FULGURATION OF BLADDER TUMOR POSSIBLE TURBT, RETROGRADE PYELOGRAM performed by Yovany Lawson MD at [address] COLONOSCOPY FLX DX W/COLLJ SPEC WHEN PFRMD N/A 09/11/2017    Dr Heather Sood internal hemorrhoids, diverticular disease-HP-No recall (age)   Sandy Ortiz [place] REVISE MEDIAN N/CARPAL TUNNEL SURG Left 07/18/2018    OPEN CARPAL TUNNEL RELEASE performed by Mai Dia MD at [address] Left 08/28/2018    LEFT CAROTID ENDARTERECTOMY WITH VEIN PATCH ANGIOPLASTY AND COMPLETION ANGIOGRAM performed by Oh Balbuena MD at [address] [facility] Left 10/15/2018    LEFT COMPLEX TOTAL KNEE ARTHROPLASTY performed by Zion Delgado MD at [address] [place] [address] GASTROINTESTINAL ENDOSCOPY N/A 11/18/2021    Dr Leslye Ulrich, Sub prep lg amount of solid and semisolid food/Medication in stomach body/antrum/pylorus, stomach lumen appeared to distended w air insufflation and collapse upon suction,   inadequate exam sugg of gastroparesis, repeat in 10-14 days    VASCULAR SURGERY  04/21/2015    Lorri BESS Ultrasound guided access of left common femoral artery. Aortogram.Diagnostic right lower extremity arteriogram.Radiologic supervision and interpretation.  VASCULAR SURGERY  01/13/2015    Lorri Puckett M.D Atherectomy,angioplasty,and stenting of left superficial femoral artery.  VASCULAR SURGERY  03/11/2014    Lorri Puckett M.D. Ultrasound-guided access of right common femoral artery. Aortogram.Left lower extremity arteriogram.Atherectomy and angioplasty of left superficial femoral artery. Radiologic supervision and interpretation.  VASCULAR SURGERY  01/18/2013    Lorri BESS Aortogram.Multistation arteriogram right lower extremity. Laser atherectomy and angioplasty of right superficial femoral artery. Selective catheterization of right tibioperoneal trunk. Angioplasty of peroneal artery and tibioperoneal trunk.  VASCULAR SURGERY  10/30/2018    [facility]. Ultrasound guided cannulation of right internal vein. Placement of right internal jugular vein tunneled dialysis catheter bard equistream xk 23cm tip to cuff    VASCULAR SURGERY  12/17/2018    SJS. Removal of tunneled dilaysis catheter right internal jugular vein.        Past Family History:  Family History   Problem Relation Age of Onset    Colon Cancer Father     Diabetes Brother     Colon Polyps Neg Hx     Liver Cancer Neg Hx     Liver Disease Neg Hx     Esophageal Cancer Neg Hx     Rectal Cancer Neg Hx     Stomach Cancer Neg Hx        Past Social History:  Social History     Socioeconomic History    Marital status:      Spouse name: Not on file    Number of children: Not on file    Years of education: Not on file    Highest education level: Not on file   Occupational History    Not on file   Tobacco Use    Smoking status: Former Smoker     Quit date: 6/3/2003     Years since quittin.4    Smokeless tobacco: Never Used   Vaping Use    Vaping Use: Never used   Substance and Sexual Activity    Alcohol use: Yes     Alcohol/week: 12.0 standard drinks     Types: 12 Glasses of wine per week     Comment: 2 glasses of wine every night    Drug use: No    Sexual activity: Yes     Partners: Female   Other Topics Concern    Not on file   Social History Narrative    Not on file     Social Determinants of Health     Financial Resource Strain:     Difficulty of Paying Living Expenses: Not on file   Food Insecurity:     Worried About Running Out of Food in the Last Year: Not on file    Ankit of Food in the Last Year: Not on file   Transportation Needs:     Lack of Transportation (Medical): Not on file    Lack of Transportation (Non-Medical):  Not on file   Physical Activity:     Days of Exercise per Week: Not on file    Minutes of Exercise per Session: Not on file   Stress:     Feeling of Stress : Not on file   Social Connections:     Frequency of Communication with Friends and Family: Not on file    Frequency of Social Gatherings with Friends and Family: Not on file    Attends Voodoo Services: Not on file    Active Member of Clubs or Organizations: Not on file    Attends Club or Organization Meetings: Not on file    Marital Status: Not on file   Intimate Partner Violence:     Fear of Current or Ex-Partner: Not on file    Emotionally Abused: Not on file    Physically Abused: Not on file    Sexually Abused: Not on file   Housing Stability:     Unable to Pay for Housing in the Last Year: Not on file    Number of [place] in the Last Year: Not on file    Unstable Housing in the Last Year: Not on file       Allergies: Allergies   Allergen Reactions    Eliquis [Apixaban] Other (See Comments)     \"almost bled to death\"    Promethazine Hcl Other (See Comments)     He became encephalopathic for several hours and was not responsive. Home Meds:  Prior to Admission medications    Medication Sig Start Date End Date Taking?  Authorizing Provider   metoclopramide (REGLAN) 5 MG tablet Take 1 tablet by mouth 3 times daily for 14 days 11/18/21 12/2/21  Balta Quintana MD   pantoprazole (PROTONIX) 20 MG tablet Take 20 mg by mouth daily    Historical Provider, MD   Nutritional Supplements (VITAMIN D BOOSTER PO) Take by mouth    Historical Provider, MD   SENNA PLUS 8.6-50 MG per tablet  10/26/21   Historical Provider, MD   glipiZIDE (GLUCOTROL XL) 10 MG extended release tablet TAKE 1 TABLET BY MOUTH EVERY DAY 10/19/21   Historical Provider, MD   dexlansoprazole (DEXILANT) 60 MG CPDR delayed release capsule Take 60 mg by mouth daily     Historical Provider, MD   bumetanide (BUMEX) 1 MG tablet Take 1 mg by mouth daily    Historical Provider, MD   amiodarone (CORDARONE) 200 MG tablet TAKE 1 TABLET BY MOUTH DAILY 9/9/21   ELISABETH Yun   mirabegron (MYRBETRIQ) 25 MG TB24 Take 1 tablet by mouth daily 8/16/21   ELISABETH Roque CNP   trospium (SANCTURA) 20 MG tablet Take 1 tablet by mouth daily 8/16/21   ELISABETH Roque CNP   aspirin 81 MG EC tablet Take 1 tablet by mouth daily 3/29/21   Yennifer Eastman MD   atorvastatin (LIPITOR) 20 MG tablet Take 1 tablet by mouth daily 3/29/21   Yennifer Eastman MD   metoprolol succinate (TOPROL XL) 100 MG extended release tablet Take 1 tablet by mouth daily 3/29/21   Yennifer Eastman MD   NIFEdipine (ADALAT CC) 60 MG extended release tablet Take 1 tablet by mouth daily 3/29/21   Yennifer Eastman MD   ferrous sulfate (IRON 325) 325 (65 Fe) MG tablet Take 1 tablet by mouth 2 times daily (with meals) 3/29/21   David Salmeron Mare Meehan MD   folic acid (FOLVITE) 1 MG tablet Take 1 tablet by mouth daily 3/29/21   Beverly Meadows MD   tamsulosin [facility]) 0.4 MG capsule Take 1 capsule by mouth 2 times daily 3/29/21   Beverly Meadows MD   vitamin D (ERGOCALCIFEROL) 1.25 MG (24730 UT) CAPS capsule Take 1 capsule by mouth once a week 3/29/21   Beverly Meadows MD   OXYGEN Inhale 2 L into the lungs continuous 3/29/21   Beverly Meadows MD       Current Meds:   amiodarone  200 mg Oral Daily    aspirin  81 mg Oral Daily    atorvastatin  20 mg Oral Daily    [Held by provider] bumetanide  1 mg Oral Daily    ferrous sulfate  325 mg Oral BID WC    folic acid  1 mg Oral Daily    metoclopramide  5 mg Oral TID    metoprolol succinate  100 mg Oral Daily    NIFEdipine  60 mg Oral Daily    tamsulosin  0.4 mg Oral BID    trospium  20 mg Oral Daily    insulin lispro  0-6 Units SubCUTAneous TID WC    insulin lispro  0-3 Units SubCUTAneous Nightly    enoxaparin  30 mg SubCUTAneous Q24H    furosemide  40 mg IntraVENous BID    ipratropium-albuterol  1 ampule Inhalation Q4H    methylPREDNISolone  40 mg IntraVENous Q8H    doxycycline (VIBRAMYCIN) IV  100 mg IntraVENous Q12H    famotidine (PEPCID) injection  20 mg IntraVENous Q24H    thiamine  100 mg Oral Daily    multivitamin  1 tablet Oral Daily       Current Infused Meds:   dextrose         Physical Exam:  Vitals:    11/25/21 1725   BP: 136/63   Pulse: 65   Resp: 18   Temp: 98.7 °F (37.1 °C)   SpO2: 96%       Intake/Output Summary (Last 24 hours) at 11/25/2021 1838  Last data filed at 11/25/2021 1836  Gross per 24 hour   Intake 1460 ml   Output 1350 ml   Net 110 ml     Estimated body mass index is 31.67 kg/m² as calculated from the following:    Height as of this encounter: 6' (1.829 m). Weight as of this encounter: 233 lb 8 oz (105.9 kg).     PHYSICAL EXAM:  HENNT: normal  PULMONARY: Decreased air entry  CVS:Normal neck vein, heart sounds distant, S1 and S2 normal, no murmur  ABD: liver and spleen not palpable, nontender, bowel sound normal  PERIPHERL: all pulses are normal with no bruit  MSK: 1+ swelling of the lower extremities  CNS: Normal CN 2,3,4,6,5,7,9,10,11,and 12. Oriented to time, place and person. Poor historian    Labs:  Recent Labs     11/23/21  1627 11/25/21  0409   WBC 14.2* 11.6*   HGB 12.5* 10.1*    135       Recent Labs     11/23/21  1627 11/23/21  1655 11/24/21  0436 11/24/21  1105 11/25/21  0409     --  138  --  136   K 4.3   < > 4.1 3.5 4.0     --  98  --  97*   CO2 26  --  24  --  23   BUN 34*  --  34*  --  41*   CREATININE 3.1*  --  3.0*  --  3.3*   LABGLOM 19*  --  20*  --  18*   MG  --   --  2.1  --  1.9   CALCIUM 9.2  --  8.9  --  8.6*    < > = values in this interval not displayed. CK, CKMB, Troponin:   Recent Labs     11/23/21  1627 11/23/21  2244 11/24/21  0436   TROPONINI 0.02 0.02 0.02       Last 3 BNP:  Recent Labs     11/23/21  1627   PROBNP 4,138*             XR ABDOMEN (KUB) (SINGLE AP VIEW)    Result Date: 11/8/2021  1. Quite extensive stool diffusely throughout the colon, appearance suggesting constipation. Colon measuring up to 9 cm diameter. Signed by Dr Sean Larry    XR CHEST PORTABLE    Result Date: 11/23/2021  Mild cardiomegaly and changes of parahilar interstitial pulmonary edema unchanged in one hour. 2. Endotracheal tube satisfactorily positioned Signed by Dr Francisca Gutierrez    XR CHEST PORTABLE    Result Date: 11/23/2021  1. Findings most suggestive of pulmonary edema. Signed by Dr Ryan Redd and Plan:  1. Respiratory failure due to acute on chronic diastolic dysfunction. He admitted to the fact that he has cut down on his diuretic for about a month. He used to take 2 diuretic at home  2. 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 COPD  4. Hypertension, diabetes, hyperlipidemia, obesity and sleep apnea  5. Stage IV renal failure  6.  History of questionable ventricular tachycardia on amiodarone  7. Underlying atrial fibrillation with ventricular pacing    Plan: Is blood pressures under good control. His pulmonary congestion has been treated with diuretic. At the moment patient on 2 loop diuretic and managed by nephrologist.  Echocardiogram is pending. His Destiny vas score is 6. Eventually he should go on oral anticoagulation    This dictation was performed using Dragon software. Mistake and misspelling may have been created without  realizing them. Please notify me for clarification.   Thank you    Daniel Almazan MD   11/25/2021, 6:38 PM

## 2021-11-26 NOTE — PROGRESS NOTES
Palliative Care Progress Note  11/26/2021 2:01 PM    Patient:  Ching Begum  YOB: 1940  Primary Care Physician: Ina Hannah MD  Advance Directive: Full Code  Admit Date: 11/23/2021       Hospital Day: 3  Portions of this note have been copied forward, however, changed to reflect the most current clinical status of this patient. CHIEF COMPLAINT/REASON FOR CONSULTATION Goals of care, code status, family support    SUBJECTIVE:  Mr. Sara Walker complains of constipation. Otherwise has no new complaints. Interval History: [de-identified] yo male, admitted 11/23/2021 in acute respiratory failure. Required intubation. Extubated 11/24/2021. Stable on NC O2    Review of Systems:   14 point review of systems is negative except as specifically addressed above. Objective:   VITALS:  BP (!) 118/52   Pulse 63   Temp 97.5 °F (36.4 °C)   Resp 22   Ht 6' (1.829 m)   Wt 235 lb (106.6 kg)   SpO2 99%   BMI 31.87 kg/m²   24HR INTAKE/OUTPUT:      Intake/Output Summary (Last 24 hours) at 11/26/2021 1401  Last data filed at 11/26/2021 0557  Gross per 24 hour   Intake 690 ml   Output 1525 ml   Net -835 ml       General appearance: alert, appears stated age, cooperative and no distress, resting comfortably in bed w/ NC O2  Head: Normocephalic, without obvious abnormality, atraumatic  Eyes: conjunctivae/corneas clear. PERRL, EOM's intact.    Ears: normal external ears and nose, throat without exudate  Neck: no adenopathy, no carotid bruit, no JVD, supple, symmetrical, trachea midline   Lungs: diminished throughout scattered end expiratory wheezes   Heart: regular rate and rhythm, S1, S2 normal, no murmur  Abdomen:soft, non-tender; non-distended, bowel sounds present    Extremities:1+ bilateral lower extremity edema,  No erythema, no tenderness to palpation  Skin: Skin color, texture, turgor normal. No rashes or lesions  Lymphatic: No palpable lymph node enlargment  Neurologic: Alert and oriented X 3, generalized weakness, normal tone. No focal deficits  Psychiatric: Alert and oriented, thought content appropriate, normal insight, mood appropriate    Medications:      dextrose        glycerin (ADULT)  1 suppository Rectal Once    amiodarone  200 mg Oral Daily    aspirin  81 mg Oral Daily    atorvastatin  20 mg Oral Daily    [Held by provider] bumetanide  1 mg Oral Daily    ferrous sulfate  325 mg Oral BID     folic acid  1 mg Oral Daily    metoclopramide  5 mg Oral TID    metoprolol succinate  100 mg Oral Daily    NIFEdipine  60 mg Oral Daily    tamsulosin  0.4 mg Oral BID    trospium  20 mg Oral Daily    insulin lispro  0-6 Units SubCUTAneous TID     insulin lispro  0-3 Units SubCUTAneous Nightly    enoxaparin  30 mg SubCUTAneous Q24H    furosemide  40 mg IntraVENous BID    ipratropium-albuterol  1 ampule Inhalation Q4H    methylPREDNISolone  40 mg IntraVENous Q8H    doxycycline (VIBRAMYCIN) IV  100 mg IntraVENous Q12H    famotidine (PEPCID) injection  20 mg IntraVENous Q24H    thiamine  100 mg Oral Daily    multivitamin  1 tablet Oral Daily     ondansetron **OR** ondansetron, acetaminophen **OR** acetaminophen, glucose, dextrose, glucagon (rDNA), dextrose, LORazepam **OR** LORazepam **OR** LORazepam **OR** LORazepam **OR** LORazepam **OR** LORazepam **OR** LORazepam **OR** LORazepam  ADULT DIET; Regular; 3 carb choices (45 gm/meal); Low Sodium (2 gm); 60 to 80 gm     Lab and other Data:     Recent Labs     11/23/21  1627 11/25/21  0409 11/26/21  0826   WBC 14.2* 11.6* 11.3*   HGB 12.5* 10.1* 10.4*    135 158     Recent Labs     11/23/21  1655 11/24/21  0436 11/24/21  1105 11/25/21  0409 11/26/21  0826   NA  --  138  --  136 135*   K   < > 4.1 3.5 4.0 3.7   CL  --  98  --  97* 93*   CO2  --  24  --  23 25   BUN  --  34*  --  41* 49*   CREATININE  --  3.0*  --  3.3* 3.3*   GLUCOSE  --  289*  --  187* 265*    < > = values in this interval not displayed.      Recent Labs     11/24/21  0993 11/25/21  0409 11/26/21  0826   AST 10 10 11   ALT 12 11 14   BILITOT 0.8 0.7 0.5   ALKPHOS 120 101 103     Troponin T:   Recent Labs     11/23/21  1627 11/23/21  2244 11/24/21  0436   TROPONINI 0.02 0.02 0.02     INR:   Recent Labs     11/23/21  1627   INR 1.16     UA:  Recent Labs     11/23/21  1734   COLORU YELLOW   PHUR 5.0   WBCUA 1   RBCUA 1   BACTERIA NEGATIVE*   CLARITYU Clear   SPECGRAV 1.017   LEUKOCYTESUR Negative   UROBILINOGEN 1.0   BILIRUBINUR Negative   BLOODU Negative   GLUCOSEU Negative     ABG:  Recent Labs     11/24/21  1105   PHART 7.450   TZZ9IOB 40.0   PO2ART 72.0*   QSU3WIN 27.8*   BEART 3.5*   HGBAE 11.4*   [id number] 92.9   CARBOXHGBART 0.4     Assessment/Plan   Principal Problem:    Acute decompensated heart failure (HCC)  Active Problems:    Essential hypertension    Palliative care patient    Bladder cancer (HCC)    Type 2 diabetes mellitus with complication, without long-term current use of insulin (HCC)    Pacemaker    Chronic diastolic congestive heart failure (HCC)    Acute kidney injury superimposed on CKD ([facility]    COPD with exacerbation (HCC)  Resolved Problems:    * No resolved hospital problems. *    Visit Summary:  Patient seen at bedside. Chart reviewed. No acute overnight events noted. Mr. Maurice Carlson states he has some constipation but otherwise feels improved. We discussed hospitalization. He confirms he wishes to remain full code and would want ventilator management again in the future if needed. Confirms his daughter, Hansa Lee, is his healthcare decision maker. Willing for follow up with outpatient supportive care once discharged home. Recommendations:   1. Palliative care: [address] discharge home when medically stable. Code status: Full code. 2. Acute on chronic diastolic CHF-improving    3. COPD exacerbation-improving, mgmt per Hospitalist    4. Acute on chronic respiratory failure 2/2 above-nearing baseline    5.  ANA superimposed on CKD-Nephrology following    Thank you for consulting Palliative Care and allowing us to participate in the care of this patient.    Time Spent Counseling > 50%:  YES                                   Total Time Spent with patient/family counseling, workup/treatment review, counseling and placement of orders/preparation of this note: 25 minutes    Electronically signed by Charmayne Chamorro, PA-C on 11/26/2021 at 2:01 PM    (Please note that portions of this note were completed with a voice recognition program.  Cricket Shankar made to edit the dictations but occasionally words are mis-transcribed.)

## 2021-11-27 LAB
ALBUMIN SERPL-MCNC: 3.9 G/DL (ref 3.5–5.2)
ALP BLD-CCNC: 90 U/L (ref 40–130)
ALT SERPL-CCNC: 12 U/L (ref 5–41)
ANION GAP SERPL CALCULATED.3IONS-SCNC: 13 MMOL/L (ref 7–19)
AST SERPL-CCNC: 10 U/L (ref 5–40)
BASOPHILS ABSOLUTE: 0 K/UL (ref 0–0.2)
BASOPHILS RELATIVE PERCENT: 0 % (ref 0–1)
BILIRUB SERPL-MCNC: 0.6 MG/DL (ref 0.2–1.2)
BUN BLDV-MCNC: 49 MG/DL (ref 8–23)
CALCIUM SERPL-MCNC: 8.7 MG/DL (ref 8.8–10.2)
CHLORIDE BLD-SCNC: 93 MMOL/L (ref 98–111)
CO2: 28 MMOL/L (ref 22–29)
CREAT SERPL-MCNC: 2.9 MG/DL (ref 0.5–1.2)
EOSINOPHILS ABSOLUTE: 0 K/UL (ref 0–0.6)
EOSINOPHILS RELATIVE PERCENT: 0 % (ref 0–5)
GFR AFRICAN AMERICAN: 25
GFR NON-AFRICAN AMERICAN: 21
GLUCOSE BLD-MCNC: 198 MG/DL (ref 74–109)
GLUCOSE BLD-MCNC: 213 MG/DL (ref 70–99)
GLUCOSE BLD-MCNC: 220 MG/DL (ref 70–99)
GLUCOSE BLD-MCNC: 236 MG/DL (ref 70–99)
GLUCOSE BLD-MCNC: 258 MG/DL (ref 70–99)
HCT VFR BLD CALC: 34.1 % (ref 42–52)
HEMOGLOBIN: 10.5 G/DL (ref 14–18)
IMMATURE GRANULOCYTES #: 0.1 K/UL
LYMPHOCYTES ABSOLUTE: 0.3 K/UL (ref 1.1–4.5)
LYMPHOCYTES RELATIVE PERCENT: 3.5 % (ref 20–40)
MAGNESIUM: 2 MG/DL (ref 1.6–2.4)
MCH RBC QN AUTO: 28.1 PG (ref 27–31)
MCHC RBC AUTO-ENTMCNC: 30.8 G/DL (ref 33–37)
MCV RBC AUTO: 91.2 FL (ref 80–94)
MONOCYTES ABSOLUTE: 0.5 K/UL (ref 0–0.9)
MONOCYTES RELATIVE PERCENT: 5.8 % (ref 0–10)
NEUTROPHILS ABSOLUTE: 8.1 K/UL (ref 1.5–7.5)
NEUTROPHILS RELATIVE PERCENT: 89.3 % (ref 50–65)
PDW BLD-RTO: 13.6 % (ref 11.5–14.5)
PERFORMED ON: ABNORMAL
PLATELET # BLD: 134 K/UL (ref 130–400)
PMV BLD AUTO: 11.7 FL (ref 9.4–12.4)
POTASSIUM SERPL-SCNC: 3.6 MMOL/L (ref 3.5–5)
RBC # BLD: 3.74 M/UL (ref 4.7–6.1)
SODIUM BLD-SCNC: 134 MMOL/L (ref 136–145)
TOTAL PROTEIN: 5.8 G/DL (ref 6.6–8.7)
WBC # BLD: 9.1 K/UL (ref 4.8–10.8)

## 2021-11-27 PROCEDURE — 83735 ASSAY OF MAGNESIUM: CPT

## 2021-11-27 PROCEDURE — 82947 ASSAY GLUCOSE BLOOD QUANT: CPT

## 2021-11-27 PROCEDURE — 1210000000 HC MED SURG R&B

## 2021-11-27 PROCEDURE — 6370000000 HC RX 637 (ALT 250 FOR IP): Performed by: PHYSICIAN ASSISTANT

## 2021-11-27 PROCEDURE — 2500000003 HC RX 250 WO HCPCS: Performed by: INTERNAL MEDICINE

## 2021-11-27 PROCEDURE — 94640 AIRWAY INHALATION TREATMENT: CPT

## 2021-11-27 PROCEDURE — 36415 COLL VENOUS BLD VENIPUNCTURE: CPT

## 2021-11-27 PROCEDURE — 6370000000 HC RX 637 (ALT 250 FOR IP): Performed by: INTERNAL MEDICINE

## 2021-11-27 PROCEDURE — 80053 COMPREHEN METABOLIC PANEL: CPT

## 2021-11-27 PROCEDURE — 2700000000 HC OXYGEN THERAPY PER DAY

## 2021-11-27 PROCEDURE — 6360000002 HC RX W HCPCS: Performed by: INTERNAL MEDICINE

## 2021-11-27 PROCEDURE — 85025 COMPLETE CBC W/AUTO DIFF WBC: CPT

## 2021-11-27 PROCEDURE — 2580000003 HC RX 258: Performed by: INTERNAL MEDICINE

## 2021-11-27 PROCEDURE — 6370000000 HC RX 637 (ALT 250 FOR IP): Performed by: FAMILY MEDICINE

## 2021-11-27 RX ORDER — POLYETHYLENE GLYCOL 3350 17 G/17G
17 POWDER, FOR SOLUTION ORAL 2 TIMES DAILY
Status: DISCONTINUED | OUTPATIENT
Start: 2021-11-27 | End: 2021-11-27

## 2021-11-27 RX ORDER — POLYETHYLENE GLYCOL 3350 17 G/17G
17 POWDER, FOR SOLUTION ORAL DAILY PRN
Status: DISCONTINUED | OUTPATIENT
Start: 2021-11-27 | End: 2021-11-29

## 2021-11-27 RX ORDER — SENNA PLUS 8.6 MG/1
1 TABLET ORAL NIGHTLY
Status: DISCONTINUED | OUTPATIENT
Start: 2021-11-27 | End: 2021-11-30 | Stop reason: HOSPADM

## 2021-11-27 RX ORDER — DOXYCYCLINE HYCLATE 100 MG/1
100 CAPSULE ORAL EVERY 12 HOURS SCHEDULED
Status: COMPLETED | OUTPATIENT
Start: 2021-11-27 | End: 2021-11-30

## 2021-11-27 RX ADMIN — DOXYCYCLINE 100 MG: 100 INJECTION, POWDER, LYOPHILIZED, FOR SOLUTION INTRAVENOUS at 09:51

## 2021-11-27 RX ADMIN — DOXYCYCLINE HYCLATE 100 MG: 100 CAPSULE ORAL at 21:10

## 2021-11-27 RX ADMIN — INSULIN LISPRO 2 UNITS: 100 INJECTION, SOLUTION INTRAVENOUS; SUBCUTANEOUS at 09:54

## 2021-11-27 RX ADMIN — ACETAMINOPHEN 650 MG: 325 TABLET ORAL at 00:37

## 2021-11-27 RX ADMIN — METHYLPREDNISOLONE SODIUM SUCCINATE 40 MG: 40 INJECTION, POWDER, FOR SOLUTION INTRAMUSCULAR; INTRAVENOUS at 00:37

## 2021-11-27 RX ADMIN — ENOXAPARIN SODIUM 30 MG: 30 INJECTION SUBCUTANEOUS at 21:11

## 2021-11-27 RX ADMIN — INSULIN LISPRO 2 UNITS: 100 INJECTION, SOLUTION INTRAVENOUS; SUBCUTANEOUS at 17:27

## 2021-11-27 RX ADMIN — METOCLOPRAMIDE 5 MG: 10 TABLET ORAL at 21:10

## 2021-11-27 RX ADMIN — THIAMINE HCL TAB 100 MG 100 MG: 100 TAB at 09:51

## 2021-11-27 RX ADMIN — INSULIN LISPRO 3 UNITS: 100 INJECTION, SOLUTION INTRAVENOUS; SUBCUTANEOUS at 13:03

## 2021-11-27 RX ADMIN — SENNOSIDES 8.6 MG: 8.6 TABLET, FILM COATED ORAL at 21:10

## 2021-11-27 RX ADMIN — FUROSEMIDE 40 MG: 10 INJECTION, SOLUTION INTRAMUSCULAR; INTRAVENOUS at 09:52

## 2021-11-27 RX ADMIN — INSULIN LISPRO 1 UNITS: 100 INJECTION, SOLUTION INTRAVENOUS; SUBCUTANEOUS at 21:11

## 2021-11-27 RX ADMIN — METOPROLOL SUCCINATE 100 MG: 50 TABLET, EXTENDED RELEASE ORAL at 09:51

## 2021-11-27 RX ADMIN — ATORVASTATIN CALCIUM 20 MG: 40 TABLET, FILM COATED ORAL at 09:51

## 2021-11-27 RX ADMIN — FERROUS SULFATE TAB 325 MG (65 MG ELEMENTAL FE) 325 MG: 325 (65 FE) TAB at 09:54

## 2021-11-27 RX ADMIN — ASPIRIN 81 MG: 81 TABLET, COATED ORAL at 09:52

## 2021-11-27 RX ADMIN — IPRATROPIUM BROMIDE AND ALBUTEROL SULFATE 1 AMPULE: .5; 2.5 SOLUTION RESPIRATORY (INHALATION) at 18:33

## 2021-11-27 RX ADMIN — IPRATROPIUM BROMIDE AND ALBUTEROL SULFATE 1 AMPULE: .5; 2.5 SOLUTION RESPIRATORY (INHALATION) at 10:57

## 2021-11-27 RX ADMIN — IPRATROPIUM BROMIDE AND ALBUTEROL SULFATE 1 AMPULE: .5; 2.5 SOLUTION RESPIRATORY (INHALATION) at 22:09

## 2021-11-27 RX ADMIN — METHYLPREDNISOLONE SODIUM SUCCINATE 40 MG: 40 INJECTION, POWDER, FOR SOLUTION INTRAMUSCULAR; INTRAVENOUS at 09:54

## 2021-11-27 RX ADMIN — FOLIC ACID 1 MG: 1 TABLET ORAL at 09:52

## 2021-11-27 RX ADMIN — NIFEDIPINE 60 MG: 60 TABLET, EXTENDED RELEASE ORAL at 09:52

## 2021-11-27 RX ADMIN — TAMSULOSIN HYDROCHLORIDE 0.4 MG: 0.4 CAPSULE ORAL at 09:51

## 2021-11-27 RX ADMIN — TROSPIUM CHLORIDE 20 MG: 20 TABLET, FILM COATED ORAL at 09:51

## 2021-11-27 RX ADMIN — TAMSULOSIN HYDROCHLORIDE 0.4 MG: 0.4 CAPSULE ORAL at 21:10

## 2021-11-27 RX ADMIN — IPRATROPIUM BROMIDE AND ALBUTEROL SULFATE 1 AMPULE: .5; 2.5 SOLUTION RESPIRATORY (INHALATION) at 07:15

## 2021-11-27 RX ADMIN — FERROUS SULFATE TAB 325 MG (65 MG ELEMENTAL FE) 325 MG: 325 (65 FE) TAB at 17:27

## 2021-11-27 RX ADMIN — METOCLOPRAMIDE 5 MG: 10 TABLET ORAL at 13:03

## 2021-11-27 RX ADMIN — DOCUSATE SODIUM 100 MG: 100 CAPSULE, LIQUID FILLED ORAL at 21:10

## 2021-11-27 RX ADMIN — AMIODARONE HYDROCHLORIDE 200 MG: 200 TABLET ORAL at 09:52

## 2021-11-27 RX ADMIN — IPRATROPIUM BROMIDE AND ALBUTEROL SULFATE 1 AMPULE: .5; 2.5 SOLUTION RESPIRATORY (INHALATION) at 15:17

## 2021-11-27 RX ADMIN — THERA TABS 1 TABLET: TAB at 09:52

## 2021-11-27 RX ADMIN — METOCLOPRAMIDE 5 MG: 10 TABLET ORAL at 09:51

## 2021-11-27 ASSESSMENT — PAIN SCALES - GENERAL
PAINLEVEL_OUTOF10: 3
PAINLEVEL_OUTOF10: 0

## 2021-11-27 NOTE — PROGRESS NOTES
Physical Therapy  Name: Josiah Wiggins  MRN:  [id number]  Date of service:  11/27/2021 11/27/21 0857   Subjective   Subjective Attempt: Pt states he is not feeling good today and does not want to participate in therapy at this time, will check back later.          Electronically signed by Kaitlin Andrews PTA on 11/27/2021 at 8:58 AM

## 2021-11-27 NOTE — PLAN OF CARE
Problem: Skin Integrity:  Goal: Will show no infection signs and symptoms  Description: Will show no infection signs and symptoms  Outcome: Ongoing  Goal: Absence of new skin breakdown  Description: Absence of new skin breakdown  Outcome: Ongoing     Problem: Falls - Risk of:  Goal: Will remain free from falls  Description: Will remain free from falls  Outcome: Ongoing  Goal: Absence of physical injury  Description: Absence of physical injury  Outcome: Ongoing     Problem:  Activity:  Goal: Capacity to carry out activities will improve  Description: Capacity to carry out activities will improve  Outcome: Ongoing  Goal: Will verbalize the importance of balancing activity with adequate rest periods  Description: Will verbalize the importance of balancing activity with adequate rest periods  Outcome: Ongoing     Problem: Cardiac:  Goal: Hemodynamic stability will improve  Description: Hemodynamic stability will improve  Outcome: Ongoing  Goal: Ability to maintain an adequate cardiac output will improve  Description: Ability to maintain an adequate cardiac output will improve  Outcome: Ongoing     Problem: Coping:  Goal: Verbalizations of decreased anxiety will decrease  Description: Verbalizations of decreased anxiety will decrease  Outcome: Ongoing     Problem: Fluid Volume:  Goal: Risk for excess fluid volume will decrease  Description: Risk for excess fluid volume will decrease  Outcome: Ongoing  Goal: Maintenance of adequate hydration will improve  Description: Maintenance of adequate hydration will improve  Outcome: Ongoing  Goal: Will show no signs and symptoms of electrolyte imbalance  Description: Will show no signs and symptoms of electrolyte imbalance  Outcome: Ongoing     Problem: Health Behavior:  Goal: Ability to manage health-related needs will improve  Description: Ability to manage health-related needs will improve  Outcome: Ongoing  Goal: Ability to seek appropriate health care will improve  Description: Ability to seek appropriate health care will improve  Outcome: Ongoing     Problem: Nutritional:  Goal: Maintenance of adequate nutrition will improve  Description: Maintenance of adequate nutrition will improve  Outcome: Ongoing     Problem: Respiratory:  Goal: Ability to maintain adequate ventilation will improve  Description: Ability to maintain adequate ventilation will improve  Outcome: Ongoing  Goal: Respiratory status will improve  Description: Respiratory status will improve  Outcome: Ongoing     Problem: Pain:  Goal: Pain level will decrease  Description: Pain level will decrease  Outcome: Ongoing  Goal: Control of acute pain  Description: Control of acute pain  Outcome: Ongoing  Goal: Control of chronic pain  Description: Control of chronic pain  Outcome: Ongoing

## 2021-11-27 NOTE — PROGRESS NOTES
Hospitalist Progress Note  [facility]     Patient: Nita Marino  : 1940  MRN: [id number]  Code Status: Full Code    Hospital Day: 4   Date of Service: 2021    Subjective:   Patient seen and examined, nurse present at the bedside. Patient feels as though her shortness of breath is improving, states he has had a bowel movement yesterday. Currently denying any headache, change in vision, abdominal pain, fevers or chills. Patient admittedly does state he is weak. Cumulative Hospital course: Patient admitted 623, 67-year-old  male with a past medical history of chronic obstructive pulmonary disease, hypertension, hyperlipidemia, GERD, peripheral vascular disease, type 2 diabetes, CKD stage IV admitted to ICU intubated and on mechanical ventilation due to acute decompensated heart failure as well as respiratory distress. Patient continues on diuresis, nephrology/cardiology following. Continues on doxycycline/methylprednisolone.         Past Medical History:   Diagnosis Date    Acute liver failure without hepatic coma 10/23/2018    Back pain     \"with tired legs as a result\"    Bladder cancer ([address] 2018    Blood circulation, collateral     Carotid arterial disease (HCC)     recent surgery    CKD (chronic kidney disease), 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/2018    COPD with acute lower respiratory infection ([address] 2021    Epigastric discomfort     GERD (gastroesophageal reflux disease)     Hyperlipidemia     Hypertension     Hypertension     Palliative care patient 10/23/2018    Pneumonia due to infectious organism 2018    Primary osteoarthritis of left knee 10/14/2018    PVD (peripheral vascular disease) ([facility])     Tremor     Tremor on Right side x 1-2 weeks per stepdaughter    Type 2 diabetes mellitus with complication, without long-term current use of insulin ([address] 2021       Past Surgical History:   Procedure Laterality Date    BACK SURGERY      CARDIAC CATHETERIZATION  03/23/2021    100% RCA    COLONOSCOPY  2007?  CYSTOSCOPY Bilateral 12/19/2018    CYSTOSCOPY, BIOSPY FULGURATION OF BLADDER TUMOR POSSIBLE TURBT, RETROGRADE PYELOGRAM performed by Karo Leiva MD at [address] COLONOSCOPY FLX DX W/COLLJ SPEC WHEN PFRMD N/A 09/11/2017    Dr Lars Shaikh internal hemorrhoids, diverticular disease-HP-No recall (age)   [facility] [place] REVISE MEDIAN N/CARPAL TUNNEL SURG Left 07/18/2018    OPEN CARPAL TUNNEL RELEASE performed by Hussain Nam MD at [address] Left 08/28/2018    LEFT CAROTID ENDARTERECTOMY WITH VEIN PATCH ANGIOPLASTY AND COMPLETION ANGIOGRAM performed by Don Dalal MD at [facility] Left 10/15/2018    LEFT COMPLEX TOTAL KNEE ARTHROPLASTY performed by Hussain Nam MD at [facility] ENDOSCOPY N/A 11/18/2021    Dr Maverick Tadeo, Sub prep lg amount of solid and semisolid food/Medication in stomach body/antrum/pylorus, stomach lumen appeared to distended w air insufflation and collapse upon suction,   inadequate exam sugg of gastroparesis, repeat in 10-14 days    VASCULAR SURGERY  04/21/2015    Sinhdu BESS Ultrasound guided access of left common femoral artery. Aortogram.Diagnostic right lower extremity arteriogram.Radiologic supervision and interpretation.  VASCULAR SURGERY  01/13/2015    Sindhu Paula M.D Atherectomy,angioplasty,and stenting of left superficial femoral artery.  VASCULAR SURGERY  03/11/2014    Sindhu Paula M.D. Ultrasound-guided access of right common femoral artery. Aortogram.Left lower extremity arteriogram.Atherectomy and angioplasty of left superficial femoral artery. Radiologic supervision and interpretation.  VASCULAR SURGERY  01/18/2013    Sindhu BESS Aortogram.Multistation arteriogram right lower extremity. Laser atherectomy and angioplasty of right superficial femoral artery. Selective catheterization of right tibioperoneal trunk. Angioplasty of peroneal artery and tibioperoneal trunk.  VASCULAR SURGERY  10/30/2018    [facility]. Ultrasound guided cannulation of right internal vein. Placement of right internal jugular vein tunneled dialysis catheter bard equistream xk 23cm tip to cuff    VASCULAR SURGERY  2018    [facility]. Removal of tunneled dilaysis catheter right internal jugular vein. Family History   Problem Relation Age of Onset    Colon Cancer Father     Diabetes Brother     Colon Polyps Neg Hx     Liver Cancer Neg Hx     Liver Disease Neg Hx     Esophageal Cancer Neg Hx     Rectal Cancer Neg Hx     Stomach Cancer Neg Hx        Social History     Socioeconomic History    Marital status:      Spouse name: Not on file    Number of children: Not on file    Years of education: Not on file    Highest education level: Not on file   Occupational History    Not on file   Tobacco Use    Smoking status: Former Smoker     Quit date: 6/3/2003     Years since quittin.4    Smokeless tobacco: Never Used   Vaping Use    Vaping Use: Never used   Substance and Sexual Activity    Alcohol use: Yes     Alcohol/week: 12.0 standard drinks     Types: 12 Glasses of wine per week     Comment: 2 glasses of wine every night    Drug use: No    Sexual activity: Yes     Partners: Female   Other Topics Concern    Not on file   Social History Narrative    Not on file     Social Determinants of Health     Financial Resource Strain:     Difficulty of Paying Living Expenses: Not on file   Food Insecurity:     Worried About Running Out of Food in the Last Year: Not on file    Ankit of Food in the Last Year: Not on file   Transportation Needs:     Lack of Transportation (Medical): Not on file    Lack of Transportation (Non-Medical):  Not on file   Physical Activity:     Days of Exercise per Week: Not on file    Minutes of Exercise per Session: Not on file Stress:     Feeling of Stress : Not on file   Social Connections:     Frequency of Communication with Friends and Family: Not on file    Frequency of Social Gatherings with Friends and Family: Not on file    Attends Evangelical Services: Not on file    Active Member of Clubs or Organizations: Not on file    Attends Club or Organization Meetings: Not on file    Marital Status: Not on file   Intimate Partner Violence:     Fear of Current or Ex-Partner: Not on file    Emotionally Abused: Not on file    Physically Abused: Not on file    Sexually Abused: Not on file   Housing Stability:     Unable to Pay for Housing in the Last Year: Not on file    Number of [place] in the Last Year: Not on file    Unstable Housing in the Last Year: Not on file       Current Facility-Administered Medications   Medication Dose Route Frequency Provider Last Rate Last Admin    senna (SENOKOT) tablet 8.6 mg  1 tablet Oral Nightly Ray Morelos MD        polyethylene glycol (GLYCOLAX) packet 17 g  17 g Oral Daily PRN Ray Morelos MD        glycerin (ADULT) suppository 2 g  1 suppository Rectal Once Lisa Naik MD        docusate sodium (COLACE) capsule 100 mg  100 mg Oral BID Evi Bowers PA-C   100 mg at 11/27/21 9236    amiodarone (CORDARONE) tablet 200 mg  200 mg Oral Daily Nikole Lower, MD   200 mg at 11/27/21 3098    aspirin EC tablet 81 mg  81 mg Oral Daily Nikole Lower, MD   81 mg at 11/27/21 0645    atorvastatin (LIPITOR) tablet 20 mg  20 mg Oral Daily Nikole Lower, MD   20 mg at 11/27/21 0951    [Held by provider] bumetanide (BUMEX) tablet 1 mg  1 mg Oral Daily Nikole Lower, MD        ferrous sulfate (IRON 325) tablet 325 mg  325 mg Oral BID WC Nikole Lower, MD   325 mg at 02/21/51 9909    folic acid (FOLVITE) tablet 1 mg  1 mg Oral Daily Nikole Lower, MD   1 mg at 11/27/21 0105    metoclopramide (REGLAN) tablet 5 mg  5 mg Oral TID Nikole Lower, MD   5 mg at 11/27/21 0951    metoprolol succinate (TOPROL XL) extended release tablet 100 mg  100 mg Oral Daily Shalom Hernandez MD   100 mg at 11/27/21 0951    NIFEdipine (PROCARDIA XL) extended release tablet 60 mg  60 mg Oral Daily Shalom Hernandez MD   60 mg at 11/27/21 6157    tamsulosin (FLOMAX) capsule 0.4 mg  0.4 mg Oral BID Shalom Hernandez MD   0.4 mg at 11/27/21 1753    trospium (SANCTURA) tablet 20 mg  20 mg Oral Daily Shalom Hernandez MD   20 mg at 11/27/21 0951    insulin lispro (HUMALOG) injection vial 0-6 Units  0-6 Units SubCUTAneous TID WC Shalom Hernandez MD   2 Units at 11/27/21 0954    insulin lispro (HUMALOG) injection vial 0-3 Units  0-3 Units SubCUTAneous Nightly Shalom Hernandez MD   2 Units at 11/26/21 2122    ondansetron (ZOFRAN-ODT) disintegrating tablet 4 mg  4 mg Oral Q8H PRN Shalom Hernandez MD        Or    ondansetron [facility]) injection 4 mg  4 mg IntraVENous Q6H PRN Shalom Hernandez MD        acetaminophen (TYLENOL) tablet 650 mg  650 mg Oral Q6H PRN Shalom Hernandez MD   650 mg at 11/27/21 0037    Or    acetaminophen (TYLENOL) suppository 650 mg  650 mg Rectal Q6H PRN Shalom Hernandez MD        enoxaparin (LOVENOX) injection 30 mg  30 mg SubCUTAneous Q24H Shalom Hernandez MD   30 mg at 11/26/21 2052    furosemide (LASIX) injection 40 mg  40 mg IntraVENous BID Shalom Hernandez MD   40 mg at 11/27/21 0952    ipratropium-albuterol (DUONEB) nebulizer solution 1 ampule  1 ampule Inhalation Q4H Shalom Hernandez MD   1 ampule at 11/27/21 1057    methylPREDNISolone sodium (SOLU-MEDROL) injection 40 mg  40 mg IntraVENous Q8H Shalom Hernandez MD   40 mg at 11/27/21 0954    doxycycline (VIBRAMYCIN) 100 mg in dextrose 5 % 100 mL IVPB  100 mg IntraVENous Q12H Shalom Hernandez  mL/hr at 11/27/21 0951 100 mg at 11/27/21 0951    glucose (GLUTOSE) 40 % oral gel 15 g  15 g Oral PRN Shalom Hernandez MD        dextrose 50 % IV solution  12.5 g IntraVENous PRN Shalom Hernandez MD        glucagon (rDNA) injection 1 mg  1 mg IntraMUSCular PRN Betina Benjamin MD        dextrose 5 % solution  100 mL/hr IntraVENous PRN Betina Benjamin MD        famotidine (PEPCID) injection 20 mg  20 mg IntraVENous Q24H Betina Benjamin MD   20 mg at 11/26/21 2051    thiamine mononitrate tablet 100 mg  100 mg Oral Daily Betina Benjamin MD   100 mg at 11/27/21 3829    multivitamin 1 tablet  1 tablet Oral Daily Betina Benjamin MD   1 tablet at 11/27/21 3162    LORazepam (ATIVAN) tablet 1 mg  1 mg Oral Q1H PRN Betina Benjamin MD        Or    LORazepam (ATIVAN) injection 1 mg  1 mg IntraVENous Q1H PRN Betina Benjamin MD   1 mg at 11/26/21 8037    Or    LORazepam (ATIVAN) tablet 2 mg  2 mg Oral Q1H PRN Betina Benjamin MD        Or    LORazepam (ATIVAN) injection 2 mg  2 mg IntraVENous Q1H PRN Betina Benjamin MD        Or    LORazepam (ATIVAN) tablet 3 mg  3 mg Oral Q1H PRN Betina Benjamin MD        Or    LORazepam (ATIVAN) injection 3 mg  3 mg IntraVENous Q1H PRN Betina Benjamin MD        Or    LORazepam (ATIVAN) tablet 4 mg  4 mg Oral Q1H PRN Betina Benjamin MD        Or    LORazepam (ATIVAN) injection 4 mg  4 mg IntraVENous Q1H PRN Betina Benjamin MD             dextrose          ROS: 14 point review of systems is negative except as specifically addressed above. Objective:   BP (!) 151/73   Pulse 64   Temp 97 °F (36.1 °C) (Temporal)   Resp 18   Ht 6' (1.829 m)   Wt 228 lb 6 oz (103.6 kg)   SpO2 97%   BMI 30.97 kg/m²     Physical Exam  Vitals reviewed. Constitutional:       General: He is not in acute distress. Appearance: He is obese. He is not toxic-appearing. HENT:      Head: Normocephalic and atraumatic. Nose: Nose normal.   Eyes:      Conjunctiva/sclera: Conjunctivae normal.   Cardiovascular:      Rate and Rhythm: Normal rate. Rhythm irregular. Pulses: Normal pulses. Pulmonary:      Comments: Nasal cannula oxygen in place, slight diminishment of breath sounds bilaterally upon auscultation  Abdominal:      Tenderness:  There is no guarding or rebound. Genitourinary:     Comments: Catheter in place  Musculoskeletal:      Cervical back: Normal range of motion. Comments: 1+ edema bilateral lower extremities   Skin:     Findings: Bruising present. Comments: Chronic venous stasis changes of the legs bilaterally   Neurological:      Mental Status: He is alert and oriented to person, place, and time. Mental status is at baseline. Motor: Weakness present. Psychiatric:         Mood and Affect: Mood normal.           Recent Labs     11/25/21 0409 11/26/21 0826 11/27/21 0219   WBC 11.6* 11.3* 9.1   RBC 3.49* 3.62* 3.74*   HGB 10.1* 10.4* 10.5*   HCT 33.2* 33.0* 34.1*   MCV 95.1* 91.2 91.2   MCH 28.9 28.7 28.1   MCHC 30.4* 31.5* 30.8*    158 134     Recent Labs     11/25/21 0409 11/26/21 0826 11/27/21 0219    135* 134*   K 4.0 3.7 3.6   ANIONGAP 16 17 13   CL 97* 93* 93*   CO2 23 25 28   BUN 41* 49* 49*   CREATININE 3.3* 3.3* 2.9*   GLUCOSE 187* 265* 198*   CALCIUM 8.6* 8.9 8.7*     Recent Labs     11/25/21 0409 11/26/21 0826 11/27/21 0219   MG 1.9 2.2 2.0     Recent Labs     11/25/21 0409 11/26/21 0826 11/27/21 0219   AST 10 11 10   ALT 11 14 12   BILITOT 0.7 0.5 0.6   ALKPHOS 101 103 90     No results for input(s): PH, PO2, PCO2, HCO3, BE, O2SAT in the last 72 hours. No results for input(s): TROPONINI in the last 72 hours. No results for input(s): INR in the last 72 hours. No results for input(s): LACTA in the last 72 hours. Intake/Output Summary (Last 24 hours) at 11/27/2021 1120  Last data filed at 11/27/2021 0940  Gross per 24 hour   Intake 590 ml   Output 3100 ml   Net -2510 ml       XR CHEST PORTABLE    Result Date: 11/23/2021  EXAMINATION: XR CHEST PORTABLE 11/23/2021 7:05 PM HISTORY: ET tube placement Single view the chest obtained compared prior exam the same date. Bilateral parahilar opacities are present consistent with pulmonary edema. Cardiac silhouettes mildly enlarged.  Endotracheal tube is satisfactorily positioned. Pacing device is present in the soft tissues of the left chest.    Mild cardiomegaly and changes of parahilar interstitial pulmonary edema unchanged in one hour. 2. Endotracheal tube satisfactorily positioned Signed by Dr Linda Shipley    XR CHEST PORTABLE    Result Date: 11/23/2021  EXAM: XR CHEST PORTABLE -- 11/23/2021 4:43 PM HISTORY: 81 years, Male, dyspnea COMPARISON: 8/26/2021 TECHNIQUE:  2 images. Frontal view of the chest. FINDINGS:  No pneumothorax. Bilateral perihilar and interstitial opacities. Possible layering left pleural effusion. No large right pleural effusion. Cardiac mediastinal silhouette similar to prior. Calcified aortic atherosclerosis. No acute bony finding. Left chest cardiac pulse generator with 2 grossly intact leads. 1. Findings most suggestive of pulmonary edema.  Signed by Dr Mica Matias and Plan:     Acute on chronic decompensated diastolic congestive heart failure  -Follow-up TTE?  -Cardiology consulted and following  -Continue to monitor I/os (cumulatively negative)  -Low-sodium diet      Acute respiratory failure with hypoxia/COPD with acute exacerbation  -Patient intubated ultimately extubated 11/24 required ICU  -Continue with diuresis as noted above  -Doxycycline/steroid  -Continue to saturating 90%  -DuoNebs      Constipation  -Currently resolved      CKD stage IV  -Condition, currently stable  -Renally dose medication, avoid nephrotoxic agents  -Neurology consulted, appreciate recommendations       Chronic atrial fibrillation  -Currently rate controlled, continue with home regimen, per cardiology patient will be benefited by oral anticoagulation upon DC  -Continue correct electrolytes      History of alcohol dependence  -Chronic condition, monitor for withdrawal, daily thiamine, folic acid, multivitamin  -Monitor and correct electrolytes      Type 2 diabetes  -Chronic condition, hold oral antihyperglycemic's, continue with sliding scale modalities as well as Premeal insulin, Accu-Cheks q. ACH S, hypoglycemic protocol                  DVT prophylaxis-Lovenox   GI prophylaxis-Pepcid        EMR Dragon/Transcription disclaimer:   Much of this encounter note is an electronic transcription/translation of spoken language to printed text.  The electronic translation of spoken language may permit erroneous, or at times, nonsensical words or phrases to be inadvertently transcribed; although attempts have made to review the note for such errors, some may still exist.    Electronically signed by   Jefe Giron MD   Internal Medicine Hospitalist  On 11/27/2021  At 11:20 AM

## 2021-11-27 NOTE — PROGRESS NOTES
amiodarone (CORDARONE) tablet 200 mg  200 mg Oral Daily Cecilia Viveros MD   200 mg at 11/27/21 3877    aspirin EC tablet 81 mg  81 mg Oral Daily Cecilia Viveros MD   81 mg at 11/27/21 4604    atorvastatin (LIPITOR) tablet 20 mg  20 mg Oral Daily Cecilia Viveros MD   20 mg at 11/27/21 0951    [Held by provider] bumetanide (BUMEX) tablet 1 mg  1 mg Oral Daily Cecilia Viveros MD        ferrous sulfate (IRON 325) tablet 325 mg  325 mg Oral BID WC Cecilia Viveros MD   325 mg at 75/96/72 4492    folic acid (FOLVITE) tablet 1 mg  1 mg Oral Daily Cecilia Viveros MD   1 mg at 11/27/21 4075    metoclopramide (REGLAN) tablet 5 mg  5 mg Oral TID Cecilia Viveros MD   5 mg at 11/27/21 0658    metoprolol succinate (TOPROL XL) extended release tablet 100 mg  100 mg Oral Daily Cecilia Viveros MD   100 mg at 11/27/21 0951    NIFEdipine (PROCARDIA XL) extended release tablet 60 mg  60 mg Oral Daily Cecilia Viveros MD   60 mg at 11/27/21 0952    tamsulosin (FLOMAX) capsule 0.4 mg  0.4 mg Oral BID Cecilia Viveros MD   0.4 mg at 11/27/21 4537    trospium (SANCTURA) tablet 20 mg  20 mg Oral Daily Cecilia Viveros MD   20 mg at 11/27/21 0951    insulin lispro (HUMALOG) injection vial 0-6 Units  0-6 Units SubCUTAneous TID WC Cecilia Viveros MD   2 Units at 11/27/21 0954    insulin lispro (HUMALOG) injection vial 0-3 Units  0-3 Units SubCUTAneous Nightly Cecilia Viveros MD   2 Units at 11/26/21 2122    ondansetron (ZOFRAN-ODT) disintegrating tablet 4 mg  4 mg Oral Q8H PRN Cecilia Viveros MD        Or    ondansetron [facility]) injection 4 mg  4 mg IntraVENous Q6H PRN Cecilia Viveros MD        acetaminophen (TYLENOL) tablet 650 mg  650 mg Oral Q6H PRN Cecilia Viveros MD   650 mg at 11/27/21 0037    Or    acetaminophen (TYLENOL) suppository 650 mg  650 mg Rectal Q6H PRN Cecilia Viveros MD        enoxaparin (LOVENOX) injection 30 mg  30 mg SubCUTAneous Q24H Cecilia Viveros MD   30 mg at 11/26/21 2052    furosemide (LASIX) injection 40 mg  40 mg IntraVENous BID Reagan Began, MD   40 mg at 11/27/21 5742    ipratropium-albuterol (DUONEB) nebulizer solution 1 ampule  1 ampule Inhalation Q4H Reagan Began, MD   1 ampule at 11/27/21 1057    methylPREDNISolone sodium (SOLU-MEDROL) injection 40 mg  40 mg IntraVENous Q8H Reagan Began, MD   40 mg at 11/27/21 0954    doxycycline (VIBRAMYCIN) 100 mg in dextrose 5 % 100 mL IVPB  100 mg IntraVENous Q12H Reagan Began, MD   Stopped at 11/27/21 1146    glucose (GLUTOSE) 40 % oral gel 15 g  15 g Oral PRN Reagan Began, MD        dextrose 50 % IV solution  12.5 g IntraVENous PRN Reagan Began, MD        glucagon (rDNA) injection 1 mg  1 mg IntraMUSCular PRN Reagan Began, MD        dextrose 5 % solution  100 mL/hr IntraVENous PRN Reagan Began, MD        famotidine (PEPCID) injection 20 mg  20 mg IntraVENous Q24H Reagan Began, MD   20 mg at 11/26/21 2051    thiamine mononitrate tablet 100 mg  100 mg Oral Daily Reagan Began, MD   100 mg at 11/27/21 3374    multivitamin 1 tablet  1 tablet Oral Daily Reagan Began, MD   1 tablet at 11/27/21 5531    LORazepam (ATIVAN) tablet 1 mg  1 mg Oral Q1H PRN Reagan Began, MD        Or    LORazepam (ATIVAN) injection 1 mg  1 mg IntraVENous Q1H PRN Reagan Began, MD   1 mg at 11/26/21 3925    Or    LORazepam (ATIVAN) tablet 2 mg  2 mg Oral Q1H PRN Reagan Began, MD        Or    LORazepam (ATIVAN) injection 2 mg  2 mg IntraVENous Q1H PRN Reagan Began, MD        Or    LORazepam (ATIVAN) tablet 3 mg  3 mg Oral Q1H PRN Reagan Began, MD        Or    LORazepam (ATIVAN) injection 3 mg  3 mg IntraVENous Q1H PRN Reagan Began, MD        Or    LORazepam (ATIVAN) tablet 4 mg  4 mg Oral Q1H PRN Reagan Began, MD        Or    LORazepam (ATIVAN) injection 4 mg  4 mg IntraVENous Q1H PRN Reagan Began, MD           Past Medical History:  Past Medical History:   Diagnosis Date    Acute liver failure without hepatic coma 10/23/2018    Back pain     \"with tired legs as a result\"  Bladder cancer ([facility] [address] 12/19/2018    Blood circulation, collateral     Carotid arterial disease (HCC)     recent surgery    CKD (chronic kidney disease), 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/2018    COPD with acute lower respiratory infection ([address] 02/24/2021    Epigastric discomfort     GERD (gastroesophageal reflux disease)     Hyperlipidemia     Hypertension     Hypertension     Palliative care patient 10/23/2018    Pneumonia due to infectious organism 11/06/2018    Primary osteoarthritis of left knee 10/14/2018    PVD (peripheral vascular disease) (HCC)     Tremor     Tremor on Right side x 1-2 weeks per stepdaughter    Type 2 diabetes mellitus with complication, without long-term current use of insulin ([facility] [address] 01/21/2021       Past Surgical History:  Past Surgical History:   Procedure Laterality Date    BACK SURGERY      CARDIAC CATHETERIZATION  03/23/2021    100% RCA    COLONOSCOPY  2007?     CYSTOSCOPY Bilateral 12/19/2018    CYSTOSCOPY, BIOSPY FULGURATION OF BLADDER TUMOR POSSIBLE TURBT, RETROGRADE PYELOGRAM performed by Darrell Edouard MD at [facility] COLONOSCOPY FLX DX W/COLLJ SPEC WHEN PFRMD N/A 09/11/2017    Dr Pat Maurer internal hemorrhoids, diverticular disease-HP-No recall (age)   [facility] [place] REVISE MEDIAN N/CARPAL TUNNEL SURG Left 07/18/2018    OPEN CARPAL TUNNEL RELEASE performed by Neelam Zamora MD at [address] Left 08/28/2018    LEFT CAROTID ENDARTERECTOMY WITH VEIN PATCH ANGIOPLASTY AND COMPLETION ANGIOGRAM performed by Khushboo Farrar MD at [facility] Left 10/15/2018    LEFT COMPLEX TOTAL KNEE ARTHROPLASTY performed by Neelam Zamora MD at [facility] ENDOSCOPY N/A 11/18/2021    Dr Balta Quinones, Sub prep lg amount of solid and semisolid food/Medication in stomach body/antrum/pylorus, stomach lumen appeared to distended w air insufflation and collapse upon suction, inadequate exam sugg of gastroparesis, repeat in 10-14 days    VASCULAR SURGERY  2015    [facility] M. D. Ultrasound guided access of left common femoral artery. Aortogram.Diagnostic right lower extremity arteriogram.Radiologic supervision and interpretation.  VASCULAR SURGERY  2015    [facility] M.D Atherectomy,angioplasty,and stenting of left superficial femoral artery.  VASCULAR SURGERY  2014    [facility] M.D. Ultrasound-guided access of right common femoral artery. Aortogram.Left lower extremity arteriogram.Atherectomy and angioplasty of left superficial femoral artery. Radiologic supervision and interpretation.  VASCULAR SURGERY  2013    [facility] M. D. Aortogram.Multistation arteriogram right lower extremity. Laser atherectomy and angioplasty of right superficial femoral artery. Selective catheterization of right tibioperoneal trunk. Angioplasty of peroneal artery and tibioperoneal trunk.  VASCULAR SURGERY  10/30/2018    [facility]. Ultrasound guided cannulation of right internal vein. Placement of right internal jugular vein tunneled dialysis catheter bard equistream xk 23cm tip to cuff    VASCULAR SURGERY  2018    [facility]. Removal of tunneled dilaysis catheter right internal jugular vein.        Family History  Family History   Problem Relation Age of Onset    Colon Cancer Father     Diabetes Brother     Colon Polyps Neg Hx     Liver Cancer Neg Hx     Liver Disease Neg Hx     Esophageal Cancer Neg Hx     Rectal Cancer Neg Hx     Stomach Cancer Neg Hx        Social History  Social History     Socioeconomic History    Marital status:      Spouse name: Not on file    Number of children: Not on file    Years of education: Not on file    Highest education level: Not on file   Occupational History    Not on file   Tobacco Use    Smoking status: Former Smoker     Quit date: 6/3/2003     Years since quittin.4    Smokeless tobacco: Never Used   Vaping Use  Vaping Use: Never used   Substance and Sexual Activity    Alcohol use: Yes     Alcohol/week: 12.0 standard drinks     Types: 12 Glasses of wine per week     Comment: 2 glasses of wine every night    Drug use: No    Sexual activity: Yes     Partners: Female   Other Topics Concern    Not on file   Social History Narrative    Not on file     Social Determinants of Health     Financial Resource Strain:     Difficulty of Paying Living Expenses: Not on file   Food Insecurity:     Worried About Running Out of Food in the Last Year: Not on file    Ankit of Food in the Last Year: Not on file   Transportation Needs:     Lack of Transportation (Medical): Not on file    Lack of Transportation (Non-Medical):  Not on file   Physical Activity:     Days of Exercise per Week: Not on file    Minutes of Exercise per Session: Not on file   Stress:     Feeling of Stress : Not on file   Social Connections:     Frequency of Communication with Friends and Family: Not on file    Frequency of Social Gatherings with Friends and Family: Not on file    Attends Denominational Services: Not on file    Active Member of [address] [place] [address] or Organizations: Not on file    Attends Club or Organization Meetings: Not on file    Marital Status: Not on file   Intimate Partner Violence:     Fear of Current or Ex-Partner: Not on file    Emotionally Abused: Not on file    Physically Abused: Not on file    Sexually Abused: Not on file   Housing Stability:     Unable to Pay for Housing in the Last Year: Not on file    Number of [place] in the Last Year: Not on file    Unstable Housing in the Last Year: Not on file         Review of Systems:  History obtained from chart review and the patient  General ROS: No fever or chills  Respiratory ROS: No cough,+ shortness of breath, -wheezing  Cardiovascular ROS: no chest pain but dyspnea on exertion  Gastrointestinal ROS: No abdominal pain or melena  Genito-Urinary ROS: No dysuria or hematuria  Musculoskeletal ROS: No joint pain or swelling       Objective:  Blood pressure (!) 149/78, pulse 65, temperature 97.5 °F (36.4 °C), temperature source Temporal, resp. rate 20, height 6' (1.829 m), weight 228 lb 6 oz (103.6 kg), SpO2 98 %. Intake/Output Summary (Last 24 hours) at 11/27/2021 1256  Last data filed at 11/27/2021 0940  Gross per 24 hour   Intake 590 ml   Output 3100 ml   Net -2510 ml       General: alert and oriented x3   Chest: Bilateral air entry with scattered rales  CVS: regular rate and rhythm  Abdominal: soft, nontender, normal bowel sounds  Extremities: no cyanosis or edema  Skin: warm and dry without rash    Labs:  BMP:   Recent Labs     11/25/21 0409 11/26/21 0826 11/27/21 0219    135* 134*   K 4.0 3.7 3.6   CL 97* 93* 93*   CO2 23 25 28   BUN 41* 49* 49*   CREATININE 3.3* 3.3* 2.9*   CALCIUM 8.6* 8.9 8.7*     CBC:   Recent Labs     11/25/21 0409 11/26/21 0826 11/27/21 0219   WBC 11.6* 11.3* 9.1   HGB 10.1* 10.4* 10.5*   HCT 33.2* 33.0* 34.1*   MCV 95.1* 91.2 91.2    158 134     LIVER PROFILE:   Recent Labs     11/25/21 0409 11/26/21 0826 11/27/21 0219   AST 10 11 10   ALT 11 14 12   BILITOT 0.7 0.5 0.6   ALKPHOS 101 103 90     PT/INR:   No results for input(s): PROTIME, INR in the last 72 hours. APTT:   No results for input(s): APTT in the last 72 hours. BNP:  No results for input(s): BNP in the last 72 hours. Ionized Calcium:No results for input(s): IONCA in the last 72 hours. Magnesium:  Recent Labs     11/25/21 0409 11/26/21 0826 11/27/21 0219   MG 1.9 2.2 2.0     Phosphorus:No results for input(s): PHOS in the last 72 hours. HgbA1C: No results for input(s): LABA1C in the last 72 hours.   Hepatic:   Recent Labs     11/25/21  0409 11/26/21  0826 11/27/21  0219   ALKPHOS 101 103 90   ALT 11 14 12   AST 10 11 10   PROT 5.8* 6.0* 5.8*   BILITOT 0.7 0.5 0.6   LABALBU 3.5 3.9 3.9     Lactic Acid: No results for input(s): LACTA in the last 72 hours.  Troponin:   Recent Labs     11/26/21  0826   CKTOTAL 16*     ABGs: No results for input(s): PH, PCO2, PO2, HCO3, O2SAT in the last 72 hours. CRP:  No results for input(s): CRP in the last 72 hours. Sed Rate:  No results for input(s): SEDRATE in the last 72 hours. Cultures:   No results for input(s): CULTURE in the last 72 hours. Radiology reports as per the Radiologist  Radiology: XR CHEST PORTABLE    Result Date: 11/23/2021  EXAMINATION: XR CHEST PORTABLE 11/23/2021 7:05 PM HISTORY: ET tube placement Single view the chest obtained compared prior exam the same date. Bilateral parahilar opacities are present consistent with pulmonary edema. Cardiac silhouettes mildly enlarged. Endotracheal tube is satisfactorily positioned. Pacing device is present in the soft tissues of the left chest.    Mild cardiomegaly and changes of parahilar interstitial pulmonary edema unchanged in one hour. 2. Endotracheal tube satisfactorily positioned Signed by Dr Linda Shipley    XR CHEST PORTABLE    Result Date: 11/23/2021  EXAM: XR CHEST PORTABLE -- 11/23/2021 4:43 PM HISTORY: 81 years, Male, dyspnea COMPARISON: 8/26/2021 TECHNIQUE:  2 images. Frontal view of the chest. FINDINGS:  No pneumothorax. Bilateral perihilar and interstitial opacities. Possible layering left pleural effusion. No large right pleural effusion. Cardiac mediastinal silhouette similar to prior. Calcified aortic atherosclerosis. No acute bony finding. Left chest cardiac pulse generator with 2 grossly intact leads. 1. Findings most suggestive of pulmonary edema. Signed by Dr Mica Matias   1. Chronic kidney disease stage IV  2. Hypertensive nephrosclerosis  3. Congestive heart failure with acute exacerbation  4. Previous episode of ANA  5. Osteoarthritis of the knee  6. Type 2 diabetes with kidney disease  7. Acute respiratory failure    Plan:  Renal function is improving. No dialysis is needed. Continue diuretics.

## 2021-11-28 LAB
ALBUMIN SERPL-MCNC: 3.6 G/DL (ref 3.5–5.2)
ALP BLD-CCNC: 76 U/L (ref 40–130)
ALT SERPL-CCNC: 13 U/L (ref 5–41)
ANION GAP SERPL CALCULATED.3IONS-SCNC: 15 MMOL/L (ref 7–19)
AST SERPL-CCNC: 11 U/L (ref 5–40)
BASOPHILS ABSOLUTE: 0 K/UL (ref 0–0.2)
BASOPHILS RELATIVE PERCENT: 0.1 % (ref 0–1)
BILIRUB SERPL-MCNC: 0.8 MG/DL (ref 0.2–1.2)
BLOOD CULTURE, ROUTINE: NORMAL
BUN BLDV-MCNC: 52 MG/DL (ref 8–23)
CALCIUM SERPL-MCNC: 8.6 MG/DL (ref 8.8–10.2)
CHLORIDE BLD-SCNC: 98 MMOL/L (ref 98–111)
CO2: 28 MMOL/L (ref 22–29)
CREAT SERPL-MCNC: 2.8 MG/DL (ref 0.5–1.2)
CULTURE, BLOOD 2: NORMAL
EOSINOPHILS ABSOLUTE: 0 K/UL (ref 0–0.6)
EOSINOPHILS RELATIVE PERCENT: 0 % (ref 0–5)
GFR AFRICAN AMERICAN: 26
GFR NON-AFRICAN AMERICAN: 22
GLUCOSE BLD-MCNC: 177 MG/DL (ref 70–99)
GLUCOSE BLD-MCNC: 186 MG/DL (ref 74–109)
GLUCOSE BLD-MCNC: 193 MG/DL (ref 70–99)
GLUCOSE BLD-MCNC: 194 MG/DL (ref 70–99)
GLUCOSE BLD-MCNC: 223 MG/DL (ref 70–99)
HCT VFR BLD CALC: 33.6 % (ref 42–52)
HEMOGLOBIN: 10.7 G/DL (ref 14–18)
IMMATURE GRANULOCYTES #: 0.2 K/UL
LV EF: 60 %
LVEF MODALITY: NORMAL
LYMPHOCYTES ABSOLUTE: 0.5 K/UL (ref 1.1–4.5)
LYMPHOCYTES RELATIVE PERCENT: 5.4 % (ref 20–40)
MAGNESIUM: 2.2 MG/DL (ref 1.6–2.4)
MCH RBC QN AUTO: 29.1 PG (ref 27–31)
MCHC RBC AUTO-ENTMCNC: 31.8 G/DL (ref 33–37)
MCV RBC AUTO: 91.3 FL (ref 80–94)
MONOCYTES ABSOLUTE: 0.9 K/UL (ref 0–0.9)
MONOCYTES RELATIVE PERCENT: 9.1 % (ref 0–10)
NEUTROPHILS ABSOLUTE: 7.8 K/UL (ref 1.5–7.5)
NEUTROPHILS RELATIVE PERCENT: 83.3 % (ref 50–65)
PDW BLD-RTO: 13.5 % (ref 11.5–14.5)
PERFORMED ON: ABNORMAL
PLATELET # BLD: 117 K/UL (ref 130–400)
PMV BLD AUTO: 11.8 FL (ref 9.4–12.4)
POTASSIUM SERPL-SCNC: 3.6 MMOL/L (ref 3.5–5)
RBC # BLD: 3.68 M/UL (ref 4.7–6.1)
SODIUM BLD-SCNC: 141 MMOL/L (ref 136–145)
TOTAL PROTEIN: 5.4 G/DL (ref 6.6–8.7)
WBC # BLD: 9.4 K/UL (ref 4.8–10.8)

## 2021-11-28 PROCEDURE — 6370000000 HC RX 637 (ALT 250 FOR IP): Performed by: INTERNAL MEDICINE

## 2021-11-28 PROCEDURE — 94640 AIRWAY INHALATION TREATMENT: CPT

## 2021-11-28 PROCEDURE — 85025 COMPLETE CBC W/AUTO DIFF WBC: CPT

## 2021-11-28 PROCEDURE — 36415 COLL VENOUS BLD VENIPUNCTURE: CPT

## 2021-11-28 PROCEDURE — 6360000002 HC RX W HCPCS: Performed by: FAMILY MEDICINE

## 2021-11-28 PROCEDURE — 93306 TTE W/DOPPLER COMPLETE: CPT

## 2021-11-28 PROCEDURE — 2700000000 HC OXYGEN THERAPY PER DAY

## 2021-11-28 PROCEDURE — 6370000000 HC RX 637 (ALT 250 FOR IP): Performed by: PHYSICIAN ASSISTANT

## 2021-11-28 PROCEDURE — 80053 COMPREHEN METABOLIC PANEL: CPT

## 2021-11-28 PROCEDURE — 82947 ASSAY GLUCOSE BLOOD QUANT: CPT

## 2021-11-28 PROCEDURE — 1210000000 HC MED SURG R&B

## 2021-11-28 PROCEDURE — 6370000000 HC RX 637 (ALT 250 FOR IP): Performed by: FAMILY MEDICINE

## 2021-11-28 PROCEDURE — 6360000002 HC RX W HCPCS: Performed by: INTERNAL MEDICINE

## 2021-11-28 PROCEDURE — 83735 ASSAY OF MAGNESIUM: CPT

## 2021-11-28 RX ORDER — METHYLPREDNISOLONE SODIUM SUCCINATE 40 MG/ML
40 INJECTION, POWDER, LYOPHILIZED, FOR SOLUTION INTRAMUSCULAR; INTRAVENOUS EVERY 12 HOURS
Status: DISCONTINUED | OUTPATIENT
Start: 2021-11-28 | End: 2021-11-29

## 2021-11-28 RX ORDER — BISACODYL 10 MG
10 SUPPOSITORY, RECTAL RECTAL DAILY PRN
Status: DISCONTINUED | OUTPATIENT
Start: 2021-11-28 | End: 2021-11-29

## 2021-11-28 RX ORDER — LACTULOSE 10 G/15ML
30 SOLUTION ORAL ONCE
Status: COMPLETED | OUTPATIENT
Start: 2021-11-28 | End: 2021-11-28

## 2021-11-28 RX ADMIN — BISACODYL 10 MG: 10 SUPPOSITORY RECTAL at 14:47

## 2021-11-28 RX ADMIN — METOCLOPRAMIDE 5 MG: 10 TABLET ORAL at 09:59

## 2021-11-28 RX ADMIN — FERROUS SULFATE TAB 325 MG (65 MG ELEMENTAL FE) 325 MG: 325 (65 FE) TAB at 18:14

## 2021-11-28 RX ADMIN — THERA TABS 1 TABLET: TAB at 09:59

## 2021-11-28 RX ADMIN — TROSPIUM CHLORIDE 20 MG: 20 TABLET, FILM COATED ORAL at 09:59

## 2021-11-28 RX ADMIN — IPRATROPIUM BROMIDE AND ALBUTEROL SULFATE 1 AMPULE: .5; 2.5 SOLUTION RESPIRATORY (INHALATION) at 10:38

## 2021-11-28 RX ADMIN — ATORVASTATIN CALCIUM 20 MG: 40 TABLET, FILM COATED ORAL at 10:00

## 2021-11-28 RX ADMIN — DOCUSATE SODIUM 100 MG: 100 CAPSULE, LIQUID FILLED ORAL at 10:00

## 2021-11-28 RX ADMIN — METOCLOPRAMIDE 5 MG: 10 TABLET ORAL at 14:46

## 2021-11-28 RX ADMIN — SENNOSIDES 8.6 MG: 8.6 TABLET, FILM COATED ORAL at 21:18

## 2021-11-28 RX ADMIN — DOXYCYCLINE HYCLATE 100 MG: 100 CAPSULE ORAL at 10:00

## 2021-11-28 RX ADMIN — ASPIRIN 81 MG: 81 TABLET, COATED ORAL at 09:59

## 2021-11-28 RX ADMIN — METOPROLOL SUCCINATE 100 MG: 50 TABLET, EXTENDED RELEASE ORAL at 10:00

## 2021-11-28 RX ADMIN — METOCLOPRAMIDE 5 MG: 10 TABLET ORAL at 21:18

## 2021-11-28 RX ADMIN — IPRATROPIUM BROMIDE AND ALBUTEROL SULFATE 1 AMPULE: .5; 2.5 SOLUTION RESPIRATORY (INHALATION) at 14:26

## 2021-11-28 RX ADMIN — THIAMINE HCL TAB 100 MG 100 MG: 100 TAB at 09:59

## 2021-11-28 RX ADMIN — POLYETHYLENE GLYCOL 3350 17 G: 17 POWDER, FOR SOLUTION ORAL at 10:07

## 2021-11-28 RX ADMIN — TAMSULOSIN HYDROCHLORIDE 0.4 MG: 0.4 CAPSULE ORAL at 09:59

## 2021-11-28 RX ADMIN — DOCUSATE SODIUM 100 MG: 100 CAPSULE, LIQUID FILLED ORAL at 21:18

## 2021-11-28 RX ADMIN — ENOXAPARIN SODIUM 30 MG: 30 INJECTION SUBCUTANEOUS at 21:18

## 2021-11-28 RX ADMIN — INSULIN LISPRO 1 UNITS: 100 INJECTION, SOLUTION INTRAVENOUS; SUBCUTANEOUS at 10:16

## 2021-11-28 RX ADMIN — FUROSEMIDE 40 MG: 10 INJECTION, SOLUTION INTRAMUSCULAR; INTRAVENOUS at 10:00

## 2021-11-28 RX ADMIN — NIFEDIPINE 60 MG: 60 TABLET, EXTENDED RELEASE ORAL at 10:00

## 2021-11-28 RX ADMIN — LACTULOSE 30 G: 20 SOLUTION ORAL at 18:12

## 2021-11-28 RX ADMIN — INSULIN LISPRO 2 UNITS: 100 INJECTION, SOLUTION INTRAVENOUS; SUBCUTANEOUS at 18:14

## 2021-11-28 RX ADMIN — IPRATROPIUM BROMIDE AND ALBUTEROL SULFATE 1 AMPULE: .5; 2.5 SOLUTION RESPIRATORY (INHALATION) at 18:58

## 2021-11-28 RX ADMIN — FERROUS SULFATE TAB 325 MG (65 MG ELEMENTAL FE) 325 MG: 325 (65 FE) TAB at 09:59

## 2021-11-28 RX ADMIN — FOLIC ACID 1 MG: 1 TABLET ORAL at 10:00

## 2021-11-28 RX ADMIN — AMIODARONE HYDROCHLORIDE 200 MG: 200 TABLET ORAL at 10:00

## 2021-11-28 RX ADMIN — TAMSULOSIN HYDROCHLORIDE 0.4 MG: 0.4 CAPSULE ORAL at 21:18

## 2021-11-28 RX ADMIN — DOXYCYCLINE HYCLATE 100 MG: 100 CAPSULE ORAL at 21:18

## 2021-11-28 RX ADMIN — IPRATROPIUM BROMIDE AND ALBUTEROL SULFATE 1 AMPULE: .5; 2.5 SOLUTION RESPIRATORY (INHALATION) at 06:38

## 2021-11-28 NOTE — PLAN OF CARE
Problem: Skin Integrity:  Goal: Will show no infection signs and symptoms  Description: Will show no infection signs and symptoms  11/28/2021 1124 by Connie Crouch RN  Outcome: Ongoing  11/28/2021 0312 by Todd Art LPN  Outcome: Ongoing  Goal: Absence of new skin breakdown  Description: Absence of new skin breakdown  11/28/2021 1124 by Connie Crouch RN  Outcome: Ongoing  11/28/2021 0312 by Todd Art LPN  Outcome: Ongoing     Problem: Falls - Risk of:  Goal: Will remain free from falls  Description: Will remain free from falls  11/28/2021 1124 by Connie Crouch RN  Outcome: Ongoing  11/28/2021 0312 by Todd Art LPN  Outcome: Ongoing  Goal: Absence of physical injury  Description: Absence of physical injury  11/28/2021 1124 by Connie Crouch RN  Outcome: Ongoing  11/28/2021 0312 by Todd Art LPN  Outcome: Ongoing     Problem:  Activity:  Goal: Capacity to carry out activities will improve  Description: Capacity to carry out activities will improve  11/28/2021 1124 by Connie Crouch RN  Outcome: Ongoing  11/28/2021 0312 by Todd Art LPN  Outcome: Ongoing  Goal: Will verbalize the importance of balancing activity with adequate rest periods  Description: Will verbalize the importance of balancing activity with adequate rest periods  11/28/2021 1124 by Connie Crouch RN  Outcome: Ongoing  11/28/2021 0312 by Todd Art LPN  Outcome: Ongoing     Problem: Cardiac:  Goal: Hemodynamic stability will improve  Description: Hemodynamic stability will improve  11/28/2021 1124 by Connie Crouch RN  Outcome: Ongoing  11/28/2021 0312 by Todd Art LPN  Outcome: Ongoing  Goal: Ability to maintain an adequate cardiac output will improve  Description: Ability to maintain an adequate cardiac output will improve  11/28/2021 1124 by Connie Crouch RN  Outcome: Ongoing  11/28/2021 0312 by Todd Art LPN  Outcome: Ongoing     Problem: Coping:  Goal: Verbalizations of decreased anxiety will decrease  Description: Verbalizations of decreased anxiety will decrease  11/28/2021 1124 by Gissell Schumacher RN  Outcome: Ongoing  11/28/2021 0312 by Sarah Le LPN  Outcome: Ongoing     Problem: Fluid Volume:  Goal: Risk for excess fluid volume will decrease  Description: Risk for excess fluid volume will decrease  11/28/2021 1124 by Gissell Schumacher RN  Outcome: Ongoing  11/28/2021 0312 by Sarah Le LPN  Outcome: Ongoing  Goal: Maintenance of adequate hydration will improve  Description: Maintenance of adequate hydration will improve  11/28/2021 1124 by Gissell Schumacher RN  Outcome: Ongoing  11/28/2021 0312 by Sarah Le LPN  Outcome: Ongoing  Goal: Will show no signs and symptoms of electrolyte imbalance  Description: Will show no signs and symptoms of electrolyte imbalance  11/28/2021 1124 by Gissell Schumacher RN  Outcome: Ongoing  11/28/2021 0312 by Sarah Le LPN  Outcome: Ongoing  Goal: Ability to achieve a balanced intake and output will improve  Description: Ability to achieve a balanced intake and output will improve  11/28/2021 1124 by Gissell Schumacher RN  Outcome: Ongoing  11/28/2021 0312 by Sarah Le LPN  Outcome: Ongoing     Problem: Health Behavior:  Goal: Ability to manage health-related needs will improve  Description: Ability to manage health-related needs will improve  11/28/2021 1124 by Gissell Schumacher RN  Outcome: Ongoing  11/28/2021 0312 by Sarah Le LPN  Outcome: Ongoing  Goal: Ability to seek appropriate health care will improve  Description: Ability to seek appropriate health care will improve  11/28/2021 1124 by Gissell Schumacher RN  Outcome: Ongoing  11/28/2021 0312 by Sarah Le LPN  Outcome: Ongoing     Problem: Nutritional:  Goal: Maintenance of adequate nutrition will improve  Description: Maintenance of adequate nutrition will improve  11/28/2021 1124 by Gissell Schumacher RN  Outcome: Ongoing  11/28/2021 0312 by Mary Willingham LPN  Outcome: Ongoing     Problem: Physical Regulation:  Goal: Will show no signs and symptoms of electrolyte imbalance  Description: Will show no signs and symptoms of electrolyte imbalance  11/28/2021 1124 by Padma Hanley RN  Outcome: Ongoing  11/28/2021 0312 by Mary Willingham LPN  Outcome: Ongoing  Goal: Complications related to the disease process, condition or treatment will be avoided or minimized  Description: Complications related to the disease process, condition or treatment will be avoided or minimized  11/28/2021 1124 by Padma Hanley RN  Outcome: Ongoing  11/28/2021 0312 by Mary Willingham LPN  Outcome: Ongoing  Goal: Ability to maintain clinical measurements within normal limits will improve  Description: Ability to maintain clinical measurements within normal limits will improve  11/28/2021 1124 by Padma Hanley RN  Outcome: Ongoing  11/28/2021 0312 by Mary Willingham LPN  Outcome: Ongoing

## 2021-11-28 NOTE — PROGRESS NOTES
Hospitalist Progress Note  [facility]     Patient: Sudhir Porter  : 1940  MRN: [id number]  Code Status: Full Code    Hospital Day: 5   Date of Service: 2021    Subjective:   Patient seen and examined this a.m. nurse present at the bedside. Patient clinically improved, concerned due to constipation requesting suppository. Patient ultimately would like to return home with wife. Currently denying headache, change in vision, chest pain, fevers or chills. Cumulative Hospital course: Patient admitted 623, 80-year-old  male with a past medical history of chronic obstructive pulmonary disease, hypertension, hyperlipidemia, GERD, peripheral vascular disease, type 2 diabetes, CKD stage IV admitted to ICU intubated and on mechanical ventilation due to acute decompensated heart failure as well as respiratory distress. Patient continues on diuresis, nephrology/cardiology following. Continues on doxycycline/methylprednisolone. Continue with bowel regimen optimization. Plans for transthoracic echocardiogram in the a.m. hopeful for DC over the next 24-48 hrs.       Past Medical History:   Diagnosis Date    Acute liver failure without hepatic coma 10/23/2018    Back pain     \"with tired legs as a result\"    Bladder cancer ([facility] [address] 2018    Blood circulation, collateral     Carotid arterial disease (HCC)     recent surgery    CKD (chronic kidney disease), 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/2018    COPD with acute lower respiratory infection ([facility] [address] 2021    Epigastric discomfort     GERD (gastroesophageal reflux disease)     Hyperlipidemia     Hypertension     Hypertension     Palliative care patient 10/23/2018    Pneumonia due to infectious organism 2018    Primary osteoarthritis of left knee 10/14/2018    PVD (peripheral vascular disease) (formerly [facility])     Tremor     Tremor on Right side x 1-2 weeks per stepdaughter    Type 2 diabetes mellitus with complication, without long-term current use of insulin ([facility] [address] 01/21/2021       Past Surgical History:   Procedure Laterality Date    BACK SURGERY      CARDIAC CATHETERIZATION  03/23/2021    100% RCA    COLONOSCOPY  2007?  CYSTOSCOPY Bilateral 12/19/2018    CYSTOSCOPY, BIOSPY FULGURATION OF BLADDER TUMOR POSSIBLE TURBT, RETROGRADE PYELOGRAM performed by Gemini Mcdonald MD at [address] COLONOSCOPY FLX DX W/COLLJ SPEC WHEN PFRMD N/A 09/11/2017    Dr Juan Manuel Hurst internal hemorrhoids, diverticular disease-HP-No recall (age)   [employer] [place] REVISE MEDIAN N/CARPAL TUNNEL SURG Left 07/18/2018    OPEN CARPAL TUNNEL RELEASE performed by Tao Campos MD at [address] Left 08/28/2018    LEFT CAROTID ENDARTERECTOMY WITH VEIN PATCH ANGIOPLASTY AND COMPLETION ANGIOGRAM performed by Osiris Min MD at [facility] Left 10/15/2018    LEFT COMPLEX TOTAL KNEE ARTHROPLASTY performed by Tao Campos MD at [facility] ENDOSCOPY N/A 11/18/2021    Dr Cedrick Lee, Sub prep lg amount of solid and semisolid food/Medication in stomach body/antrum/pylorus, stomach lumen appeared to distended w air insufflation and collapse upon suction,   inadequate exam sugg of gastroparesis, repeat in 10-14 days    VASCULAR SURGERY  04/21/2015    Lissette BESS Ultrasound guided access of left common femoral artery. Aortogram.Diagnostic right lower extremity arteriogram.Radiologic supervision and interpretation.  VASCULAR SURGERY  01/13/2015    Lissette Gao M.D Atherectomy,angioplasty,and stenting of left superficial femoral artery.  VASCULAR SURGERY  03/11/2014    Lissette Gao M.D. Ultrasound-guided access of right common femoral artery. Aortogram.Left lower extremity arteriogram.Atherectomy and angioplasty of left superficial femoral artery. Radiologic supervision and interpretation.     VASCULAR SURGERY  01/18/2013    Lissette Gao M. D.Aortogram.Multistation arteriogram right lower extremity. Laser atherectomy and angioplasty of right superficial femoral artery. Selective catheterization of right tibioperoneal trunk. Angioplasty of peroneal artery and tibioperoneal trunk.  VASCULAR SURGERY  10/30/2018    [facility]. Ultrasound guided cannulation of right internal vein. Placement of right internal jugular vein tunneled dialysis catheter bard equistream xk 23cm tip to cuff    VASCULAR SURGERY  2018    [facility]. Removal of tunneled dilaysis catheter right internal jugular vein. Family History   Problem Relation Age of Onset    Colon Cancer Father     Diabetes Brother     Colon Polyps Neg Hx     Liver Cancer Neg Hx     Liver Disease Neg Hx     Esophageal Cancer Neg Hx     Rectal Cancer Neg Hx     Stomach Cancer Neg Hx        Social History     Socioeconomic History    Marital status:      Spouse name: Not on file    Number of children: Not on file    Years of education: Not on file    Highest education level: Not on file   Occupational History    Not on file   Tobacco Use    Smoking status: Former Smoker     Quit date: 6/3/2003     Years since quittin.5    Smokeless tobacco: Never Used   Vaping Use    Vaping Use: Never used   Substance and Sexual Activity    Alcohol use: Yes     Alcohol/week: 12.0 standard drinks     Types: 12 Glasses of wine per week     Comment: 2 glasses of wine every night    Drug use: No    Sexual activity: Yes     Partners: Female   Other Topics Concern    Not on file   Social History Narrative    Not on file     Social Determinants of Health     Financial Resource Strain:     Difficulty of Paying Living Expenses: Not on file   Food Insecurity:     Worried About Running Out of Food in the Last Year: Not on file    Ankit of Food in the Last Year: Not on file   Transportation Needs:     Lack of Transportation (Medical): Not on file    Lack of Transportation (Non-Medical):  Not on file Physical Activity:     Days of Exercise per Week: Not on file    Minutes of Exercise per Session: Not on file   Stress:     Feeling of Stress : Not on file   Social Connections:     Frequency of Communication with Friends and Family: Not on file    Frequency of Social Gatherings with Friends and Family: Not on file    Attends Yazdanism Services: Not on file    Active Member of Clubs or Organizations: Not on file    Attends Club or Organization Meetings: Not on file    Marital Status: Not on file   Intimate Partner Violence:     Fear of Current or Ex-Partner: Not on file    Emotionally Abused: Not on file    Physically Abused: Not on file    Sexually Abused: Not on file   Housing Stability:     Unable to Pay for Housing in the Last Year: Not on file    Number of [place] in the Last Year: Not on file    Unstable Housing in the Last Year: Not on file       Current Facility-Administered Medications   Medication Dose Route Frequency Provider Last Rate Last Admin    methylPREDNISolone sodium (SOLU-MEDROL) injection 40 mg  40 mg IntraVENous Q12H Matthieu Fam MD        senna (SENOKOT) tablet 8.6 mg  1 tablet Oral Nightly Matthieu Fam MD   8.6 mg at 11/27/21 2110    polyethylene glycol (GLYCOLAX) packet 17 g  17 g Oral Daily PRN Matthieu Fam MD   17 g at 11/28/21 1007    doxycycline hyclate (VIBRAMYCIN) capsule 100 mg  100 mg Oral 2 times per day Matthieu Fam MD   100 mg at 11/28/21 1000    glycerin (ADULT) suppository 2 g  1 suppository Rectal Once Carmen Mariscal MD        docusate sodium (COLACE) capsule 100 mg  100 mg Oral BID Carli Ortiz PA-C   100 mg at 11/28/21 1000    amiodarone (CORDARONE) tablet 200 mg  200 mg Oral Daily Nick Mike MD   200 mg at 11/28/21 1000    aspirin EC tablet 81 mg  81 mg Oral Daily Nick Mike MD   81 mg at 11/28/21 0959    atorvastatin (LIPITOR) tablet 20 mg  20 mg Oral Daily Nick Mike MD   20 mg at 11/28/21 1000    [Held by provider] bumetanide (BUMEX) tablet 1 mg  1 mg Oral Daily Yanely Serrano MD        ferrous sulfate (IRON 325) tablet 325 mg  325 mg Oral BID  Yanely Serrano MD   325 mg at 99/69/44 6065    folic acid (FOLVITE) tablet 1 mg  1 mg Oral Daily Yanely Serrano MD   1 mg at 11/28/21 1000    metoclopramide (REGLAN) tablet 5 mg  5 mg Oral TID Yanely Serrano MD   5 mg at 11/28/21 0959    metoprolol succinate (TOPROL XL) extended release tablet 100 mg  100 mg Oral Daily Yanely Serrano MD   100 mg at 11/28/21 1000    NIFEdipine (PROCARDIA XL) extended release tablet 60 mg  60 mg Oral Daily Yanely Serrano MD   60 mg at 11/28/21 1000    tamsulosin (FLOMAX) capsule 0.4 mg  0.4 mg Oral BID Yanely Serrano MD   0.4 mg at 11/28/21 0959    trospium (SANCTURA) tablet 20 mg  20 mg Oral Daily Yanely Serrano MD   20 mg at 11/28/21 0959    insulin lispro (HUMALOG) injection vial 0-6 Units  0-6 Units SubCUTAneous TID  Yanely Serrano MD   2 Units at 11/27/21 1727    insulin lispro (HUMALOG) injection vial 0-3 Units  0-3 Units SubCUTAneous Nightly Yanely Serrano MD   1 Units at 11/27/21 2111    ondansetron (ZOFRAN-ODT) disintegrating tablet 4 mg  4 mg Oral Q8H PRN Yanely Serrano MD        Or    [facility] injection 4 mg  4 mg IntraVENous Q6H PRN Yanely Serrano MD        acetaminophen (TYLENOL) tablet 650 mg  650 mg Oral Q6H PRN Yanely Serrano MD   650 mg at 11/27/21 0037    Or    acetaminophen (TYLENOL) suppository 650 mg  650 mg Rectal Q6H PRN Yanely Serrano MD        enoxaparin (LOVENOX) injection 30 mg  30 mg SubCUTAneous Q24H Yanely Serrano MD   30 mg at 11/27/21 2111    furosemide (LASIX) injection 40 mg  40 mg IntraVENous BID Yanely Serrano MD   40 mg at 11/28/21 1000    ipratropium-albuterol (DUONEB) nebulizer solution 1 ampule  1 ampule Inhalation Q4H Yanely Serrano MD   1 ampule at 11/28/21 1038    glucose (GLUTOSE) 40 % oral gel 15 g  15 g Oral PRN Yanely Serrano MD        dextrose 50 % IV solution 12.5 g IntraVENous PRN Ena Han MD        glucagon (rDNA) injection 1 mg  1 mg IntraMUSCular PRN Ena Han MD        dextrose 5 % solution  100 mL/hr IntraVENous PRN Ena Han MD        famotidine (PEPCID) injection 20 mg  20 mg IntraVENous Q24H Ena Han MD   20 mg at 11/26/21 2051    thiamine mononitrate tablet 100 mg  100 mg Oral Daily Ena Han MD   100 mg at 11/28/21 3205    multivitamin 1 tablet  1 tablet Oral Daily Ena Han MD   1 tablet at 11/28/21 0959    LORazepam (ATIVAN) tablet 1 mg  1 mg Oral Q1H PRN Ena Han MD        Or    LORazepam (ATIVAN) injection 1 mg  1 mg IntraVENous Q1H PRN Ena Han MD   1 mg at 11/26/21 1467    Or    LORazepam (ATIVAN) tablet 2 mg  2 mg Oral Q1H PRN Ena Han MD        Or    LORazepam (ATIVAN) injection 2 mg  2 mg IntraVENous Q1H PRN Ena Han MD             dextrose          ROS: 14 point review of systems is negative except as specifically addressed above. Objective:   /60   Pulse 61   Temp 97.5 °F (36.4 °C) (Oral)   Resp 18   Ht 6' (1.829 m)   Wt 228 lb 6 oz (103.6 kg)   SpO2 99%   BMI 30.97 kg/m²     Physical Exam  Vitals reviewed. Constitutional:       General: He is not in acute distress. Appearance: He is obese. He is not toxic-appearing. HENT:      Head: Normocephalic and atraumatic. Nose: Nose normal.   Eyes:      Conjunctiva/sclera: Conjunctivae normal.   Cardiovascular:      Rate and Rhythm: Normal rate. Rhythm irregular. Pulses: Normal pulses. Pulmonary:      Comments: Nasal cannula oxygen in place, slight diminishment of breath sounds bilaterally upon auscultation  Abdominal:      General: Bowel sounds are normal. There is distension. Tenderness: There is no guarding or rebound. Genitourinary:     Comments: Catheter in place  Musculoskeletal:      Cervical back: Normal range of motion.       Comments: 1+ edema bilateral lower extremities   Skin: Findings: Bruising present. Comments: Chronic venous stasis changes of the legs bilaterally   Neurological:      Mental Status: He is alert and oriented to person, place, and time. Mental status is at baseline. Motor: Weakness present. Psychiatric:         Mood and Affect: Mood normal.           Recent Labs     11/26/21 0826 11/27/21 0219 11/28/21  0343   WBC 11.3* 9.1 9.4   RBC 3.62* 3.74* 3.68*   HGB 10.4* 10.5* 10.7*   HCT 33.0* 34.1* 33.6*   MCV 91.2 91.2 91.3   MCH 28.7 28.1 29.1   MCHC 31.5* 30.8* 31.8*    134 117*     Recent Labs     11/26/21 0826 11/27/21 0219 11/28/21  0343   * 134* 141   K 3.7 3.6 3.6   ANIONGAP 17 13 15   CL 93* 93* 98   CO2 25 28 28   BUN 49* 49* 52*   CREATININE 3.3* 2.9* 2.8*   GLUCOSE 265* 198* 186*   CALCIUM 8.9 8.7* 8.6*     Recent Labs     11/26/21 0826 11/27/21 0219 11/28/21  0343   MG 2.2 2.0 2.2     Recent Labs     11/26/21 0826 11/27/21 0219 11/28/21  0343   AST 11 10 11   ALT 14 12 13   BILITOT 0.5 0.6 0.8   ALKPHOS 103 90 76     No results for input(s): PH, PO2, PCO2, HCO3, BE, O2SAT in the last 72 hours. No results for input(s): TROPONINI in the last 72 hours. No results for input(s): INR in the last 72 hours. No results for input(s): LACTA in the last 72 hours. Intake/Output Summary (Last 24 hours) at 11/28/2021 1051  Last data filed at 11/28/2021 7039  Gross per 24 hour   Intake 120 ml   Output 2350 ml   Net -2230 ml       XR CHEST PORTABLE    Result Date: 11/23/2021  EXAMINATION: XR CHEST PORTABLE 11/23/2021 7:05 PM HISTORY: ET tube placement Single view the chest obtained compared prior exam the same date. Bilateral parahilar opacities are present consistent with pulmonary edema. Cardiac silhouettes mildly enlarged. Endotracheal tube is satisfactorily positioned. Pacing device is present in the soft tissues of the left chest.    Mild cardiomegaly and changes of parahilar interstitial pulmonary edema unchanged in one hour.  2. prophylaxis-Pepcid  Discharge disposition -anticipate in the next 24 to 48 hours, home with home health services      EMR Dragon/Transcription disclaimer:   Much of this encounter note is an electronic transcription/translation of spoken language to printed text.  The electronic translation of spoken language may permit erroneous, or at times, nonsensical words or phrases to be inadvertently transcribed; although attempts have made to review the note for such errors, some may still exist.    Electronically signed by   Leon Raymond MD   Internal Medicine Hospitalist  On 11/28/2021  At 10:51 AM

## 2021-11-28 NOTE — PROGRESS NOTES
Loss of IV access. Attempted by this RN and Dominik Wright. Will pass to night shift patient is in need of an IV. MD made aware.     Electronically signed by Chadwick Pardo RN on 11/27/2021 at 6:05 PM

## 2021-11-28 NOTE — PROGRESS NOTES
Nephrology ([facility]) Progress Note    Patient:  Aleks Berumen  YOB: 1940  Date of Service: 11/28/2021  MRN: [id number]   Acct: [de-identified]   Primary Care Physician: Lucy Yee MD  Advance Directive: Full Code  Admit Date: 11/23/2021       Hospital Day: 5  Referring Provider: Jennifer Parmar MD    Patient Seen, Chart, Consults notes, Labs, Radiology studies reviewed. Subjective:    Patient is an [de-identified] yo pleasant man who has a history of hypertension, type 2 diabetes, congestive heart failure, osteoarthritis of the knee. He is status post left total knee replacement about 3 years ago. His course was complicated with multiorgan failure including acute kidney injury and need for dialysis. Back then patient has received a total of 3-1/2-month of dialysis and has since improved his kidney function. He continues to have chronic kidney disease now stage IV with baseline GFR around 18. He follows with Dr. Pola Villegas at the renal clinic. Patient is currently admitted with congestive heart failure exacerbation. He required endotracheal intubation mechanical ventilation. He was extubated on November 24. Renal service was consulted to manage his chronic kidney disease. Serum creatinine is 3.3 with GFR of 18. Patient denies any dysuria. He denies NSAIDs abuse. He is less dyspneic and has good urine output.     Allergies:  Eliquis [apixaban] and Promethazine hcl    Medicines:  Current Facility-Administered Medications   Medication Dose Route Frequency Provider Last Rate Last Admin    methylPREDNISolone sodium (SOLU-MEDROL) injection 40 mg  40 mg IntraVENous Q12H Jennifer Parmar MD        bisacodyl (DULCOLAX) suppository 10 mg  10 mg Rectal Daily PRN Jennifer Parmar MD        [facility]) tablet 8.6 mg  1 tablet Oral Nightly Jennifer Parmar MD   8.6 mg at 11/27/21 2110    polyethylene glycol (GLYCOLAX) packet 17 g  17 g Oral Daily PRN Jennifer Parmar MD   37 g at 11/28/21 1007    doxycycline hyclate (VIBRAMYCIN) capsule 100 mg  100 mg Oral 2 times per day Rancho Westbrook MD   100 mg at 11/28/21 1000    glycerin (ADULT) suppository 2 g  1 suppository Rectal Once Hung Mariscal MD        docusate sodium (COLACE) capsule 100 mg  100 mg Oral BID Herminia Chow PA-C   100 mg at 11/28/21 1000    amiodarone (CORDARONE) tablet 200 mg  200 mg Oral Daily Yanely Serrano MD   200 mg at 11/28/21 1000    aspirin EC tablet 81 mg  81 mg Oral Daily Yanely Serrano MD   81 mg at 11/28/21 0959    atorvastatin (LIPITOR) tablet 20 mg  20 mg Oral Daily Yanely Serrano MD   20 mg at 11/28/21 1000    [Held by provider] bumetanide (BUMEX) tablet 1 mg  1 mg Oral Daily Yanely Serrano MD        ferrous sulfate (IRON 325) tablet 325 mg  325 mg Oral BID  Yanely Serrano MD   325 mg at 21/89/72 6060    folic acid (FOLVITE) tablet 1 mg  1 mg Oral Daily Yanely Serrano MD   1 mg at 11/28/21 1000    metoclopramide (REGLAN) tablet 5 mg  5 mg Oral TID Yanely Serrano MD   5 mg at 11/28/21 0959    metoprolol succinate (TOPROL XL) extended release tablet 100 mg  100 mg Oral Daily Yanely Serrano MD   100 mg at 11/28/21 1000    NIFEdipine (PROCARDIA XL) extended release tablet 60 mg  60 mg Oral Daily Yanely Serrano MD   60 mg at 11/28/21 1000    tamsulosin (FLOMAX) capsule 0.4 mg  0.4 mg Oral BID Yanely Serrano MD   0.4 mg at 11/28/21 5728    trospium (SANCTURA) tablet 20 mg  20 mg Oral Daily Yanely Serrano MD   20 mg at 11/28/21 0959    insulin lispro (HUMALOG) injection vial 0-6 Units  0-6 Units SubCUTAneous TID  Yanely Serrano MD   1 Units at 11/28/21 1016    insulin lispro (HUMALOG) injection vial 0-3 Units  0-3 Units SubCUTAneous Nightly Yanely Serrano MD   1 Units at 11/27/21 2111    ondansetron (ZOFRAN-ODT) disintegrating tablet 4 mg  4 mg Oral Q8H PRN Yanely Serrano MD        Or    ondansetron [facility]) injection 4 mg  4 mg IntraVENous Q6H PRN Yanely Serrano MD        acetaminophen (TYLENOL) tablet 650 mg  650 mg Oral Q6H PRN Jorgito Stringer MD   650 mg at 11/27/21 0037    Or    acetaminophen (TYLENOL) suppository 650 mg  650 mg Rectal Q6H PRN Jorgito Stringer MD        enoxaparin (LOVENOX) injection 30 mg  30 mg SubCUTAneous Q24H Jorgito Stringer MD   30 mg at 11/27/21 2111    furosemide (LASIX) injection 40 mg  40 mg IntraVENous BID Jorgito Stringer MD   40 mg at 11/28/21 1000    ipratropium-albuterol (DUONEB) nebulizer solution 1 ampule  1 ampule Inhalation Q4H Jorgito Stringer MD   1 ampule at 11/28/21 1038    glucose (GLUTOSE) 40 % oral gel 15 g  15 g Oral PRN Jorgito Stringer MD        dextrose 50 % IV solution  12.5 g IntraVENous PRN Jorgito Stringer MD        glucagon (rDNA) injection 1 mg  1 mg IntraMUSCular PRN Jorgito Stringer MD        dextrose 5 % solution  100 mL/hr IntraVENous PRN Jorgito Stringer MD        famotidine (PEPCID) injection 20 mg  20 mg IntraVENous Q24H Jrogito Stringer MD   20 mg at 11/26/21 2051    thiamine mononitrate tablet 100 mg  100 mg Oral Daily Jorgito Stringer MD   100 mg at 11/28/21 8370    multivitamin 1 tablet  1 tablet Oral Daily Jorgito Stringer MD   1 tablet at 11/28/21 0959    LORazepam (ATIVAN) tablet 1 mg  1 mg Oral Q1H PRN Jorgito Stringer MD        Or    LORazepam (ATIVAN) injection 1 mg  1 mg IntraVENous Q1H PRN Jorgito Stringer MD   1 mg at 11/26/21 9581    Or    LORazepam (ATIVAN) tablet 2 mg  2 mg Oral Q1H PRN Jorgito Stringer MD        Or    LORazepam (ATIVAN) injection 2 mg  2 mg IntraVENous Q1H PRN Jorgito Stringer MD           Past Medical History:  Past Medical History:   Diagnosis Date    Acute liver failure without hepatic coma 10/23/2018    Back pain     \"with tired legs as a result\"    Bladder cancer ([facility] 12/19/2018    Blood circulation, collateral     Carotid arterial disease ([facility]     recent surgery    CKD (chronic kidney disease), 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/2018    COPD with acute lower respiratory infection ([facility] [facility] 02/24/2021    Epigastric discomfort     GERD (gastroesophageal reflux disease)     Hyperlipidemia     Hypertension     Hypertension     Palliative care patient 10/23/2018    Pneumonia due to infectious organism 11/06/2018    Primary osteoarthritis of left knee 10/14/2018    PVD (peripheral vascular disease) (HCC)     Tremor     Tremor on Right side x 1-2 weeks per stepdaughter    Type 2 diabetes mellitus with complication, without long-term current use of insulin ([facility] [facility] 01/21/2021       Past Surgical History:  Past Surgical History:   Procedure Laterality Date    BACK SURGERY      CARDIAC CATHETERIZATION  03/23/2021    100% RCA    COLONOSCOPY  2007?  CYSTOSCOPY Bilateral 12/19/2018    CYSTOSCOPY, BIOSPY FULGURATION OF BLADDER TUMOR POSSIBLE TURBT, RETROGRADE PYELOGRAM performed by Joanna Bower MD at [address] COLONOSCOPY FLX DX W/COLLJ SPEC WHEN PFRMD N/A 09/11/2017    Dr Jesus Joel internal hemorrhoids, diverticular disease-HP-No recall (age)   [employer] [place] REVISE MEDIAN N/CARPAL TUNNEL SURG Left 07/18/2018    OPEN CARPAL TUNNEL RELEASE performed by Margie Amezquita MD at [address] Left 08/28/2018    LEFT CAROTID ENDARTERECTOMY WITH VEIN PATCH ANGIOPLASTY AND COMPLETION ANGIOGRAM performed by Cynthia Francois MD at [address] Left 10/15/2018    LEFT COMPLEX TOTAL KNEE ARTHROPLASTY performed by Margie Amezquita MD at [address] [facility] ENDOSCOPY N/A 11/18/2021    Dr Myla Amaro, Sub prep lg amount of solid and semisolid food/Medication in stomach body/antrum/pylorus, stomach lumen appeared to distended w air insufflation and collapse upon suction,   inadequate exam sugg of gastroparesis, repeat in 10-14 days    VASCULAR SURGERY  04/21/2015    Ashley BESS Ultrasound guided access of left common femoral artery. Aortogram.Diagnostic right lower extremity arteriogram.Radiologic supervision and interpretation.  VASCULAR SURGERY  2015    Karen Peres M.D Atherectomy,angioplasty,and stenting of left superficial femoral artery.  VASCULAR SURGERY  2014    Karen Peres M.D. Ultrasound-guided access of right common femoral artery. Aortogram.Left lower extremity arteriogram.Atherectomy and angioplasty of left superficial femoral artery. Radiologic supervision and interpretation.  VASCULAR SURGERY  2013    Karen BESS Aortogram.Multistation arteriogram right lower extremity. Laser atherectomy and angioplasty of right superficial femoral artery. Selective catheterization of right tibioperoneal trunk. Angioplasty of peroneal artery and tibioperoneal trunk.  VASCULAR SURGERY  10/30/2018    [facility]. Ultrasound guided cannulation of right internal vein. Placement of right internal jugular vein tunneled dialysis catheter bard equistream xk 23cm tip to cuff    VASCULAR SURGERY  2018    [facility]. Removal of tunneled dilaysis catheter right internal jugular vein. Family History  Family History   Problem Relation Age of Onset    Colon Cancer Father     Diabetes Brother     Colon Polyps Neg Hx     Liver Cancer Neg Hx     Liver Disease Neg Hx     Esophageal Cancer Neg Hx     Rectal Cancer Neg Hx     Stomach Cancer Neg Hx        Social History  Social History     Socioeconomic History    Marital status:      Spouse name: Not on file    Number of children: Not on file    Years of education: Not on file    Highest education level: Not on file   Occupational History    Not on file   Tobacco Use    Smoking status: Former Smoker     Quit date: 6/3/2003     Years since quittin.5    Smokeless tobacco: Never Used   Vaping Use    Vaping Use: Never used   Substance and Sexual Activity    Alcohol use:  Yes     Alcohol/week: 12.0 standard drinks     Types: 12 Glasses of wine per week     Comment: 2 glasses of wine every night    Drug use: No    Sexual activity: Yes Partners: Female   Other Topics Concern    Not on file   Social History Narrative    Not on file     Social Determinants of Health     Financial Resource Strain:     Difficulty of Paying Living Expenses: Not on file   Food Insecurity:     Worried About Running Out of Food in the Last Year: Not on file    Ankit of Food in the Last Year: Not on file   Transportation Needs:     Lack of Transportation (Medical): Not on file    Lack of Transportation (Non-Medical): Not on file   Physical Activity:     Days of Exercise per Week: Not on file    Minutes of Exercise per Session: Not on file   Stress:     Feeling of Stress : Not on file   Social Connections:     Frequency of Communication with Friends and Family: Not on file    Frequency of Social Gatherings with Friends and Family: Not on file    Attends Holiness Services: Not on file    Active Member of [address] [place] [address] [employer] or Organizations: Not on file    Attends Club or Organization Meetings: Not on file    Marital Status: Not on file   Intimate Partner Violence:     Fear of Current or Ex-Partner: Not on file    Emotionally Abused: Not on file    Physically Abused: Not on file    Sexually Abused: Not on file   Housing Stability:     Unable to Pay for Housing in the Last Year: Not on file    Number of [place] in the Last Year: Not on file    Unstable Housing in the Last Year: Not on file         Review of Systems:  History obtained from chart review and the patient  General ROS: No fever or chills  Respiratory ROS: No cough,+ shortness of breath, -wheezing  Cardiovascular ROS: no chest pain but dyspnea on exertion  Gastrointestinal ROS: No abdominal pain or melena  Genito-Urinary ROS: No dysuria or hematuria  Musculoskeletal ROS: No joint pain or swelling       Objective:  Blood pressure 123/60, pulse 61, temperature 97.5 °F (36.4 °C), temperature source Oral, resp. rate 18, height 6' (1.829 m), weight 228 lb 6 oz (103.6 kg), SpO2 99 %.     Intake/Output Summary (Last 24 hours) at 11/28/2021 1214  Last data filed at 11/28/2021 0218  Gross per 24 hour   Intake --   Output 2350 ml   Net -2350 ml       General: alert and oriented x3   Chest: Bilateral air entry with scattered rales  CVS: regular rate and rhythm  Abdominal: soft, nontender, normal bowel sounds  Extremities: no cyanosis or edema  Skin: warm and dry without rash    Labs:  BMP:   Recent Labs     11/26/21 0826 11/27/21 0219 11/28/21 0343   * 134* 141   K 3.7 3.6 3.6   CL 93* 93* 98   CO2 25 28 28   BUN 49* 49* 52*   CREATININE 3.3* 2.9* 2.8*   CALCIUM 8.9 8.7* 8.6*     CBC:   Recent Labs     11/26/21 0826 11/27/21 0219 11/28/21 0343   WBC 11.3* 9.1 9.4   HGB 10.4* 10.5* 10.7*   HCT 33.0* 34.1* 33.6*   MCV 91.2 91.2 91.3    134 117*     LIVER PROFILE:   Recent Labs     11/26/21 0826 11/27/21 0219 11/28/21 0343   AST 11 10 11   ALT 14 12 13   BILITOT 0.5 0.6 0.8   ALKPHOS 103 90 76     PT/INR:   No results for input(s): PROTIME, INR in the last 72 hours. APTT:   No results for input(s): APTT in the last 72 hours. BNP:  No results for input(s): BNP in the last 72 hours. Ionized Calcium:No results for input(s): IONCA in the last 72 hours. Magnesium:  Recent Labs     11/26/21 0826 11/27/21 0219 11/28/21 0343   MG 2.2 2.0 2.2     Phosphorus:No results for input(s): PHOS in the last 72 hours. HgbA1C: No results for input(s): LABA1C in the last 72 hours. Hepatic:   Recent Labs     11/26/21 0826 11/27/21 0219 11/28/21 0343   ALKPHOS 103 90 76   ALT 14 12 13   AST 11 10 11   PROT 6.0* 5.8* 5.4*   BILITOT 0.5 0.6 0.8   LABALBU 3.9 3.9 3.6     Lactic Acid: No results for input(s): LACTA in the last 72 hours. Troponin:   Recent Labs     11/26/21  0826   CKTOTAL 16*     ABGs: No results for input(s): PH, PCO2, PO2, HCO3, O2SAT in the last 72 hours. CRP:  No results for input(s): CRP in the last 72 hours. Sed Rate:  No results for input(s): SEDRATE in the last 72 hours.     Cultures:

## 2021-11-28 NOTE — PLAN OF CARE
Problem: Skin Integrity:  Goal: Will show no infection signs and symptoms  Description: Will show no infection signs and symptoms  Outcome: Ongoing  Goal: Absence of new skin breakdown  Description: Absence of new skin breakdown  Outcome: Ongoing     Problem: Falls - Risk of:  Goal: Will remain free from falls  Description: Will remain free from falls  Outcome: Ongoing  Goal: Absence of physical injury  Description: Absence of physical injury  Outcome: Ongoing     Problem:  Activity:  Goal: Capacity to carry out activities will improve  Description: Capacity to carry out activities will improve  Outcome: Ongoing  Goal: Will verbalize the importance of balancing activity with adequate rest periods  Description: Will verbalize the importance of balancing activity with adequate rest periods  Outcome: Ongoing     Problem: Cardiac:  Goal: Hemodynamic stability will improve  Description: Hemodynamic stability will improve  Outcome: Ongoing  Goal: Ability to maintain an adequate cardiac output will improve  Description: Ability to maintain an adequate cardiac output will improve  Outcome: Ongoing     Problem: Coping:  Goal: Verbalizations of decreased anxiety will decrease  Description: Verbalizations of decreased anxiety will decrease  Outcome: Ongoing     Problem: Fluid Volume:  Goal: Risk for excess fluid volume will decrease  Description: Risk for excess fluid volume will decrease  Outcome: Ongoing  Goal: Maintenance of adequate hydration will improve  Description: Maintenance of adequate hydration will improve  Outcome: Ongoing  Goal: Will show no signs and symptoms of electrolyte imbalance  Description: Will show no signs and symptoms of electrolyte imbalance  Outcome: Ongoing  Goal: Ability to achieve a balanced intake and output will improve  Description: Ability to achieve a balanced intake and output will improve  Outcome: Ongoing     Problem: Health Behavior:  Goal: Ability to manage health-related needs will improve  Description: Ability to manage health-related needs will improve  Outcome: Ongoing  Goal: Ability to seek appropriate health care will improve  Description: Ability to seek appropriate health care will improve  Outcome: Ongoing     Problem: Nutritional:  Goal: Maintenance of adequate nutrition will improve  Description: Maintenance of adequate nutrition will improve  Outcome: Ongoing     Problem: Physical Regulation:  Goal: Will show no signs and symptoms of electrolyte imbalance  Description: Will show no signs and symptoms of electrolyte imbalance  Outcome: Ongoing  Goal: Complications related to the disease process, condition or treatment will be avoided or minimized  Description: Complications related to the disease process, condition or treatment will be avoided or minimized  Outcome: Ongoing  Goal: Ability to maintain clinical measurements within normal limits will improve  Description: Ability to maintain clinical measurements within normal limits will improve  Outcome: Ongoing     Problem: Respiratory:  Goal: Ability to maintain adequate ventilation will improve  Description: Ability to maintain adequate ventilation will improve  Outcome: Ongoing  Goal: Respiratory status will improve  Description: Respiratory status will improve  Outcome: Ongoing     Problem: Pain:  Goal: Pain level will decrease  Description: Pain level will decrease  Outcome: Ongoing  Goal: Control of acute pain  Description: Control of acute pain  Outcome: Ongoing  Goal: Control of chronic pain  Description: Control of chronic pain  Outcome: Ongoing

## 2021-11-29 ENCOUNTER — APPOINTMENT (OUTPATIENT)
Dept: GENERAL RADIOLOGY | Age: 81
DRG: 208 | End: 2021-11-29
Payer: MEDICARE

## 2021-11-29 LAB
ALBUMIN SERPL-MCNC: 3.5 G/DL (ref 3.5–5.2)
ALP BLD-CCNC: 72 U/L (ref 40–130)
ALT SERPL-CCNC: 14 U/L (ref 5–41)
ANION GAP SERPL CALCULATED.3IONS-SCNC: 12 MMOL/L (ref 7–19)
AST SERPL-CCNC: 12 U/L (ref 5–40)
BASOPHILS ABSOLUTE: 0 K/UL (ref 0–0.2)
BASOPHILS RELATIVE PERCENT: 0.3 % (ref 0–1)
BILIRUB SERPL-MCNC: 0.8 MG/DL (ref 0.2–1.2)
BUN BLDV-MCNC: 49 MG/DL (ref 8–23)
CALCIUM SERPL-MCNC: 8.4 MG/DL (ref 8.8–10.2)
CHLORIDE BLD-SCNC: 98 MMOL/L (ref 98–111)
CO2: 30 MMOL/L (ref 22–29)
CREAT SERPL-MCNC: 2.4 MG/DL (ref 0.5–1.2)
EOSINOPHILS ABSOLUTE: 0 K/UL (ref 0–0.6)
EOSINOPHILS RELATIVE PERCENT: 0 % (ref 0–5)
GFR AFRICAN AMERICAN: 32
GFR NON-AFRICAN AMERICAN: 26
GLUCOSE BLD-MCNC: 136 MG/DL (ref 70–99)
GLUCOSE BLD-MCNC: 153 MG/DL (ref 70–99)
GLUCOSE BLD-MCNC: 171 MG/DL (ref 74–109)
GLUCOSE BLD-MCNC: 218 MG/DL (ref 70–99)
GLUCOSE BLD-MCNC: 310 MG/DL (ref 70–99)
HCT VFR BLD CALC: 33 % (ref 42–52)
HEMOGLOBIN: 10.3 G/DL (ref 14–18)
IMMATURE GRANULOCYTES #: 0.2 K/UL
LYMPHOCYTES ABSOLUTE: 0.7 K/UL (ref 1.1–4.5)
LYMPHOCYTES RELATIVE PERCENT: 7.4 % (ref 20–40)
MAGNESIUM: 2.2 MG/DL (ref 1.6–2.4)
MCH RBC QN AUTO: 28.5 PG (ref 27–31)
MCHC RBC AUTO-ENTMCNC: 31.2 G/DL (ref 33–37)
MCV RBC AUTO: 91.4 FL (ref 80–94)
MONOCYTES ABSOLUTE: 0.9 K/UL (ref 0–0.9)
MONOCYTES RELATIVE PERCENT: 10.7 % (ref 0–10)
NEUTROPHILS ABSOLUTE: 7 K/UL (ref 1.5–7.5)
NEUTROPHILS RELATIVE PERCENT: 79.3 % (ref 50–65)
PDW BLD-RTO: 13.4 % (ref 11.5–14.5)
PERFORMED ON: ABNORMAL
PLATELET # BLD: 126 K/UL (ref 130–400)
PMV BLD AUTO: 12 FL (ref 9.4–12.4)
POTASSIUM SERPL-SCNC: 3.4 MMOL/L (ref 3.5–5)
RBC # BLD: 3.61 M/UL (ref 4.7–6.1)
SODIUM BLD-SCNC: 140 MMOL/L (ref 136–145)
TOTAL PROTEIN: 5 G/DL (ref 6.6–8.7)
WBC # BLD: 8.8 K/UL (ref 4.8–10.8)

## 2021-11-29 PROCEDURE — 6360000002 HC RX W HCPCS: Performed by: INTERNAL MEDICINE

## 2021-11-29 PROCEDURE — 6370000000 HC RX 637 (ALT 250 FOR IP): Performed by: PHYSICIAN ASSISTANT

## 2021-11-29 PROCEDURE — 94640 AIRWAY INHALATION TREATMENT: CPT

## 2021-11-29 PROCEDURE — 6370000000 HC RX 637 (ALT 250 FOR IP): Performed by: FAMILY MEDICINE

## 2021-11-29 PROCEDURE — 80053 COMPREHEN METABOLIC PANEL: CPT

## 2021-11-29 PROCEDURE — 2700000000 HC OXYGEN THERAPY PER DAY

## 2021-11-29 PROCEDURE — 1210000000 HC MED SURG R&B

## 2021-11-29 PROCEDURE — 74018 RADEX ABDOMEN 1 VIEW: CPT

## 2021-11-29 PROCEDURE — 2500000003 HC RX 250 WO HCPCS: Performed by: INTERNAL MEDICINE

## 2021-11-29 PROCEDURE — 6370000000 HC RX 637 (ALT 250 FOR IP): Performed by: INTERNAL MEDICINE

## 2021-11-29 PROCEDURE — 83735 ASSAY OF MAGNESIUM: CPT

## 2021-11-29 PROCEDURE — 85025 COMPLETE CBC W/AUTO DIFF WBC: CPT

## 2021-11-29 PROCEDURE — 82947 ASSAY GLUCOSE BLOOD QUANT: CPT

## 2021-11-29 PROCEDURE — 36415 COLL VENOUS BLD VENIPUNCTURE: CPT

## 2021-11-29 RX ORDER — POLYETHYLENE GLYCOL 3350 17 G/17G
17 POWDER, FOR SOLUTION ORAL DAILY
Status: DISCONTINUED | OUTPATIENT
Start: 2021-11-29 | End: 2021-11-30 | Stop reason: HOSPADM

## 2021-11-29 RX ORDER — LACTULOSE 10 G/15ML
30 SOLUTION ORAL ONCE
Status: DISCONTINUED | OUTPATIENT
Start: 2021-11-29 | End: 2021-11-29

## 2021-11-29 RX ORDER — PREDNISONE 20 MG/1
40 TABLET ORAL DAILY
Status: DISCONTINUED | OUTPATIENT
Start: 2021-11-29 | End: 2021-11-30 | Stop reason: HOSPADM

## 2021-11-29 RX ORDER — BISACODYL 10 MG
10 SUPPOSITORY, RECTAL RECTAL DAILY
Status: DISCONTINUED | OUTPATIENT
Start: 2021-11-29 | End: 2021-11-30 | Stop reason: HOSPADM

## 2021-11-29 RX ORDER — POTASSIUM CHLORIDE 20 MEQ/1
40 TABLET, EXTENDED RELEASE ORAL ONCE
Status: COMPLETED | OUTPATIENT
Start: 2021-11-29 | End: 2021-11-29

## 2021-11-29 RX ADMIN — IPRATROPIUM BROMIDE AND ALBUTEROL SULFATE 1 AMPULE: .5; 2.5 SOLUTION RESPIRATORY (INHALATION) at 14:25

## 2021-11-29 RX ADMIN — PREDNISONE 40 MG: 20 TABLET ORAL at 12:02

## 2021-11-29 RX ADMIN — SENNOSIDES 8.6 MG: 8.6 TABLET, FILM COATED ORAL at 19:48

## 2021-11-29 RX ADMIN — IPRATROPIUM BROMIDE AND ALBUTEROL SULFATE 1 AMPULE: .5; 2.5 SOLUTION RESPIRATORY (INHALATION) at 07:40

## 2021-11-29 RX ADMIN — ENOXAPARIN SODIUM 30 MG: 30 INJECTION SUBCUTANEOUS at 19:47

## 2021-11-29 RX ADMIN — THERA TABS 1 TABLET: TAB at 09:36

## 2021-11-29 RX ADMIN — DOXYCYCLINE HYCLATE 100 MG: 100 CAPSULE ORAL at 19:48

## 2021-11-29 RX ADMIN — DOCUSATE SODIUM 100 MG: 100 CAPSULE, LIQUID FILLED ORAL at 09:34

## 2021-11-29 RX ADMIN — INSULIN LISPRO 2 UNITS: 100 INJECTION, SOLUTION INTRAVENOUS; SUBCUTANEOUS at 20:45

## 2021-11-29 RX ADMIN — FERROUS SULFATE TAB 325 MG (65 MG ELEMENTAL FE) 325 MG: 325 (65 FE) TAB at 16:10

## 2021-11-29 RX ADMIN — METOPROLOL SUCCINATE 100 MG: 50 TABLET, EXTENDED RELEASE ORAL at 09:34

## 2021-11-29 RX ADMIN — TAMSULOSIN HYDROCHLORIDE 0.4 MG: 0.4 CAPSULE ORAL at 19:47

## 2021-11-29 RX ADMIN — FAMOTIDINE 20 MG: 10 INJECTION, SOLUTION INTRAVENOUS at 21:51

## 2021-11-29 RX ADMIN — THIAMINE HCL TAB 100 MG 100 MG: 100 TAB at 09:35

## 2021-11-29 RX ADMIN — ACETAMINOPHEN 650 MG: 325 TABLET ORAL at 19:48

## 2021-11-29 RX ADMIN — IPRATROPIUM BROMIDE AND ALBUTEROL SULFATE 1 AMPULE: .5; 2.5 SOLUTION RESPIRATORY (INHALATION) at 11:39

## 2021-11-29 RX ADMIN — IPRATROPIUM BROMIDE AND ALBUTEROL SULFATE 1 AMPULE: .5; 2.5 SOLUTION RESPIRATORY (INHALATION) at 19:13

## 2021-11-29 RX ADMIN — BISACODYL 10 MG: 10 SUPPOSITORY RECTAL at 09:35

## 2021-11-29 RX ADMIN — INSULIN LISPRO 2 UNITS: 100 INJECTION, SOLUTION INTRAVENOUS; SUBCUTANEOUS at 18:13

## 2021-11-29 RX ADMIN — TROSPIUM CHLORIDE 20 MG: 20 TABLET, FILM COATED ORAL at 09:37

## 2021-11-29 RX ADMIN — ACETAMINOPHEN 650 MG: 325 TABLET ORAL at 06:33

## 2021-11-29 RX ADMIN — ASPIRIN 81 MG: 81 TABLET, COATED ORAL at 09:36

## 2021-11-29 RX ADMIN — FOLIC ACID 1 MG: 1 TABLET ORAL at 09:35

## 2021-11-29 RX ADMIN — TAMSULOSIN HYDROCHLORIDE 0.4 MG: 0.4 CAPSULE ORAL at 09:36

## 2021-11-29 RX ADMIN — DOCUSATE SODIUM 100 MG: 100 CAPSULE, LIQUID FILLED ORAL at 19:48

## 2021-11-29 RX ADMIN — POTASSIUM CHLORIDE 40 MEQ: 1500 TABLET, EXTENDED RELEASE ORAL at 09:34

## 2021-11-29 RX ADMIN — DOXYCYCLINE HYCLATE 100 MG: 100 CAPSULE ORAL at 09:36

## 2021-11-29 RX ADMIN — ATORVASTATIN CALCIUM 20 MG: 40 TABLET, FILM COATED ORAL at 09:36

## 2021-11-29 RX ADMIN — METOCLOPRAMIDE 5 MG: 10 TABLET ORAL at 16:10

## 2021-11-29 RX ADMIN — AMIODARONE HYDROCHLORIDE 200 MG: 200 TABLET ORAL at 09:36

## 2021-11-29 RX ADMIN — POLYETHYLENE GLYCOL 3350 17 G: 17 POWDER, FOR SOLUTION ORAL at 09:35

## 2021-11-29 RX ADMIN — METOCLOPRAMIDE 5 MG: 10 TABLET ORAL at 19:48

## 2021-11-29 RX ADMIN — NIFEDIPINE 60 MG: 60 TABLET, EXTENDED RELEASE ORAL at 09:37

## 2021-11-29 RX ADMIN — METOCLOPRAMIDE 5 MG: 10 TABLET ORAL at 09:36

## 2021-11-29 ASSESSMENT — PAIN DESCRIPTION - ONSET: ONSET: ON-GOING

## 2021-11-29 ASSESSMENT — PAIN - FUNCTIONAL ASSESSMENT: PAIN_FUNCTIONAL_ASSESSMENT: ACTIVITIES ARE NOT PREVENTED

## 2021-11-29 ASSESSMENT — PAIN DESCRIPTION - FREQUENCY: FREQUENCY: INTERMITTENT

## 2021-11-29 ASSESSMENT — PAIN SCALES - GENERAL
PAINLEVEL_OUTOF10: 3
PAINLEVEL_OUTOF10: 3

## 2021-11-29 ASSESSMENT — PAIN DESCRIPTION - PAIN TYPE: TYPE: ACUTE PAIN

## 2021-11-29 ASSESSMENT — PAIN DESCRIPTION - LOCATION: LOCATION: HEAD

## 2021-11-29 ASSESSMENT — PAIN DESCRIPTION - ORIENTATION: ORIENTATION: ANTERIOR

## 2021-11-29 ASSESSMENT — PAIN DESCRIPTION - DESCRIPTORS: DESCRIPTORS: HEADACHE

## 2021-11-29 ASSESSMENT — PAIN DESCRIPTION - PROGRESSION: CLINICAL_PROGRESSION: NOT CHANGED

## 2021-11-29 NOTE — PROGRESS NOTES
Nephrology ([facility]) Progress Note    Patient:  Isatu Connelly  YOB: 1940  Date of Service: 11/29/2021  MRN: [id number]   Acct: [de-identified]   Primary Care Physician: Kamila Eisenberg MD  Advance Directive: Full Code  Admit Date: 11/23/2021       Hospital Day: 6  Referring Provider: Elza Dominguez MD    Patient independently seen and examined, Chart, Consults, Notes, Operative notes, Labs, Cardiology, and Radiology studies reviewed as available. Chief complaint: Abnormal labs. Subjective:  Isatu Connelly is a [de-identified] y.o. male for whom we were consulted for evaluation and treatment of acute kidney injury/chronic kidney disease. Patient had 1 episode of acute kidney injury needing  short-term hemodialysis and finally had partial renal recovery with stage IV chronic kidney disease. His estimated GFR nearly 18 mm. He follows Dr. Maura Mueller in the office. Patient also has history of type 2 diabetes, hypertension, congestive heart failure and osteoarthritis. Presented with increasing shortness of breath and was diagnosed with congestive heart failure exacerbation. This morning he feels well and has mild shortness of breath.     Allergies:  Eliquis [apixaban] and Promethazine hcl    Medicines:  Current Facility-Administered Medications   Medication Dose Route Frequency Provider Last Rate Last Admin    bisacodyl (DULCOLAX) suppository 10 mg  10 mg Rectal Daily Elza Dominguez MD   10 mg at 11/29/21 0935    polyethylene glycol (GLYCOLAX) packet 17 g  17 g Oral Daily Elza Dominguez MD   17 g at 11/29/21 0935    magnesium citrate solution 296 mL  296 mL Oral Once Elza Dominguez MD        methylPREDNISolone sodium (SOLU-MEDROL) injection 40 mg  40 mg IntraVENous Q12H Elza Dominguez MD        [facility]) tablet 8.6 mg  1 tablet Oral Nightly Elza Dominguez MD   8.6 mg at 11/28/21 2118    doxycycline hyclate (VIBRAMYCIN) capsule 100 mg  100 mg Oral 2 times per day Samir Abreu MD   100 mg at 11/29/21 0936    glycerin (ADULT) suppository 2 g  1 suppository Rectal Once Cal Galo MD        docusate sodium (COLACE) capsule 100 mg  100 mg Oral BID Veleria Pro, PA-C   100 mg at 11/29/21 7352    amiodarone (CORDARONE) tablet 200 mg  200 mg Oral Daily Juancarlos Arzate MD   200 mg at 11/29/21 0936    aspirin EC tablet 81 mg  81 mg Oral Daily Juancarlos Arzate MD   81 mg at 11/29/21 0936    atorvastatin (LIPITOR) tablet 20 mg  20 mg Oral Daily Juancarlos Arzate MD   20 mg at 11/29/21 0936    [Held by provider] bumetanide (BUMEX) tablet 1 mg  1 mg Oral Daily Juancarlos Arzate MD        ferrous sulfate (IRON 325) tablet 325 mg  325 mg Oral BID  Juancarlos Arzate MD   325 mg at 58/41/68 7344    folic acid (FOLVITE) tablet 1 mg  1 mg Oral Daily Juancarlos Arzate MD   1 mg at 11/29/21 0935    metoclopramide (REGLAN) tablet 5 mg  5 mg Oral TID Juancarlos Arzate MD   5 mg at 11/29/21 0936    metoprolol succinate (TOPROL XL) extended release tablet 100 mg  100 mg Oral Daily Juancarlos Arzate MD   100 mg at 11/29/21 0934    NIFEdipine (PROCARDIA XL) extended release tablet 60 mg  60 mg Oral Daily Juancarlos Arzate MD   60 mg at 11/29/21 0937    tamsulosin (FLOMAX) capsule 0.4 mg  0.4 mg Oral BID Juancarlos Arzate MD   0.4 mg at 11/29/21 5187    trospium (SANCTURA) tablet 20 mg  20 mg Oral Daily Juancarlos Arzate MD   20 mg at 11/29/21 0937    insulin lispro (HUMALOG) injection vial 0-6 Units  0-6 Units SubCUTAneous TID  Juancarlos Arzate MD   2 Units at 11/28/21 1814    insulin lispro (HUMALOG) injection vial 0-3 Units  0-3 Units SubCUTAneous Nightly Juancarlos Arzate MD   1 Units at 11/27/21 2111    ondansetron (ZOFRAN-ODT) disintegrating tablet 4 mg  4 mg Oral Q8H PRN Juancarlos Arzate MD        Or    ondansetron [facility]) injection 4 mg  4 mg IntraVENous Q6H PRN Juancarlos Arzate MD        acetaminophen (TYLENOL) tablet 650 mg  650 mg Oral Q6H PRN Juancarlos Arzate MD   650 mg at 11/29/21 4747 Or    acetaminophen (TYLENOL) suppository 650 mg  650 mg Rectal Q6H PRN Jaiden Ni MD        enoxaparin (LOVENOX) injection 30 mg  30 mg SubCUTAneous Q24H Jaiden Ni MD   30 mg at 11/28/21 2118    furosemide (LASIX) injection 40 mg  40 mg IntraVENous BID Jaiden Ni MD   40 mg at 11/28/21 1000    ipratropium-albuterol (DUONEB) nebulizer solution 1 ampule  1 ampule Inhalation Q4H Jaiden Ni MD   1 ampule at 11/29/21 0740    glucose (GLUTOSE) 40 % oral gel 15 g  15 g Oral PRN Jaiden Ni MD        dextrose 50 % IV solution  12.5 g IntraVENous PRN Jaiden Ni MD        glucagon (rDNA) injection 1 mg  1 mg IntraMUSCular PRN Jaiden Ni MD        dextrose 5 % solution  100 mL/hr IntraVENous PRN Jaiden Ni MD        famotidine (PEPCID) injection 20 mg  20 mg IntraVENous Q24H Jaiden Ni MD   20 mg at 11/26/21 2051    thiamine mononitrate tablet 100 mg  100 mg Oral Daily Jaiden Ni MD   100 mg at 11/29/21 0935    multivitamin 1 tablet  1 tablet Oral Daily Jaiden Ni MD   1 tablet at 11/29/21 4122    LORazepam (ATIVAN) tablet 1 mg  1 mg Oral Q1H PRN Jaiden Ni MD        Or    LORazepam (ATIVAN) injection 1 mg  1 mg IntraVENous Q1H PRN Jaiden Ni MD   1 mg at 11/26/21 6687    Or    LORazepam (ATIVAN) tablet 2 mg  2 mg Oral Q1H PRN Jaiden Ni MD        Or    LORazepam (ATIVAN) injection 2 mg  2 mg IntraVENous Q1H PRN Jaiden Ni MD           Past Medical History:  Past Medical History:   Diagnosis Date    Acute liver failure without hepatic coma 10/23/2018    Back pain     \"with tired legs as a result\"    Bladder cancer ([facility] [address] 12/19/2018    Blood circulation, collateral     Carotid arterial disease ([facility] [address] [facility]     recent surgery    CKD (chronic kidney disease), 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/2018    COPD with acute lower respiratory infection ([facility] [address] 02/24/2021    Epigastric discomfort     GERD (gastroesophageal reflux disease)     Hyperlipidemia     Hypertension     Hypertension     Palliative care patient 10/23/2018    Pneumonia due to infectious organism 11/06/2018    Primary osteoarthritis of left knee 10/14/2018    PVD (peripheral vascular disease) (HCC)     Tremor     Tremor on Right side x 1-2 weeks per stepdaughter    Type 2 diabetes mellitus with complication, without long-term current use of insulin ([facility] [address] 01/21/2021       Past Surgical History:  Past Surgical History:   Procedure Laterality Date    BACK SURGERY      CARDIAC CATHETERIZATION  03/23/2021    100% RCA    COLONOSCOPY  2007?  CYSTOSCOPY Bilateral 12/19/2018    CYSTOSCOPY, BIOSPY FULGURATION OF BLADDER TUMOR POSSIBLE TURBT, RETROGRADE PYELOGRAM performed by Raffi Aguilera MD at [address] COLONOSCOPY FLX DX W/COLLJ SPEC WHEN PFRMD N/A 09/11/2017    Dr Yue Melara internal hemorrhoids, diverticular disease-HP-No recall (age)   [place] REVISE MEDIAN N/CARPAL TUNNEL SURG Left 07/18/2018    OPEN CARPAL TUNNEL RELEASE performed by Madeleine Beltran MD at [address] Left 08/28/2018    LEFT CAROTID ENDARTERECTOMY WITH VEIN PATCH ANGIOPLASTY AND COMPLETION ANGIOGRAM performed by Arnaldo Robin MD at [address] [facility] Left 10/15/2018    LEFT COMPLEX TOTAL KNEE ARTHROPLASTY performed by Madeleine Beltran MD at [facility] ENDOSCOPY N/A 11/18/2021    Dr Johnson Husbands, Sub prep lg amount of solid and semisolid food/Medication in stomach body/antrum/pylorus, stomach lumen appeared to distended w air insufflation and collapse upon suction,   inadequate exam sugg of gastroparesis, repeat in 10-14 days    VASCULAR SURGERY  04/21/2015    Haris BESS Ultrasound guided access of left common femoral artery. Aortogram.Diagnostic right lower extremity arteriogram.Radiologic supervision and interpretation.     VASCULAR SURGERY  01/13/2015    Haris Carrasquillo M.D Atherectomy,angioplasty,and stenting of left superficial femoral artery.  VASCULAR SURGERY  2014    Jamshid Leiva M.D. Ultrasound-guided access of right common femoral artery. Aortogram.Left lower extremity arteriogram.Atherectomy and angioplasty of left superficial femoral artery. Radiologic supervision and interpretation.  VASCULAR SURGERY  2013    Jamshid BESS Aortogram.Multistation arteriogram right lower extremity. Laser atherectomy and angioplasty of right superficial femoral artery. Selective catheterization of right tibioperoneal trunk. Angioplasty of peroneal artery and tibioperoneal trunk.  VASCULAR SURGERY  10/30/2018    [facility]. Ultrasound guided cannulation of right internal vein. Placement of right internal jugular vein tunneled dialysis catheter bard equistream xk 23cm tip to cuff    VASCULAR SURGERY  2018    [facility]. Removal of tunneled dilaysis catheter right internal jugular vein. Family History  Family History   Problem Relation Age of Onset    Colon Cancer Father     Diabetes Brother     Colon Polyps Neg Hx     Liver Cancer Neg Hx     Liver Disease Neg Hx     Esophageal Cancer Neg Hx     Rectal Cancer Neg Hx     Stomach Cancer Neg Hx        Social History  Social History     Socioeconomic History    Marital status:      Spouse name: Not on file    Number of children: Not on file    Years of education: Not on file    Highest education level: Not on file   Occupational History    Not on file   Tobacco Use    Smoking status: Former Smoker     Quit date: 6/3/2003     Years since quittin.5    Smokeless tobacco: Never Used   Vaping Use    Vaping Use: Never used   Substance and Sexual Activity    Alcohol use:  Yes     Alcohol/week: 12.0 standard drinks     Types: 12 Glasses of wine per week     Comment: 2 glasses of wine every night    Drug use: No    Sexual activity: Yes     Partners: Female   Other Topics Concern    Not on file   Social History Narrative    Not on file     Social Determinants of Health     Financial Resource Strain:     Difficulty of Paying Living Expenses: Not on file   Food Insecurity:     Worried About Running Out of Food in the Last Year: Not on file    Ankit of Food in the Last Year: Not on file   Transportation Needs:     Lack of Transportation (Medical): Not on file    Lack of Transportation (Non-Medical):  Not on file   Physical Activity:     Days of Exercise per Week: Not on file    Minutes of Exercise per Session: Not on file   Stress:     Feeling of Stress : Not on file   Social Connections:     Frequency of Communication with Friends and Family: Not on file    Frequency of Social Gatherings with Friends and Family: Not on file    Attends [employer] Services: Not on file    Active Member of Clubs or Organizations: Not on file    Attends Club or Organization Meetings: Not on file    Marital Status: Not on file   Intimate Partner Violence:     Fear of Current or Ex-Partner: Not on file    Emotionally Abused: Not on file    Physically Abused: Not on file    Sexually Abused: Not on file   Housing Stability:     Unable to Pay for Housing in the Last Year: Not on file    Number of Places Lived in the Last Year: Not on file    Unstable Housing in the Last Year: Not on file         Review of Systems:  History obtained from chart review and the patient  General ROS: No fever or chills  Respiratory ROS: positive for - shortness of breath  Cardiovascular ROS: No chest pain or palpitations  Gastrointestinal ROS: No abdominal pain or melena  Genito-Urinary ROS: No dysuria or hematuria  Musculoskeletal ROS: No joint pain or swelling         Objective:  Patient Vitals for the past 24 hrs:   BP Temp Temp src Pulse Resp SpO2 Weight   11/29/21 0645 134/60 97.9 °F (36.6 °C) Temporal 59 16 96 % --   11/29/21 0600 -- -- -- -- -- -- 231 lb 6.4 oz (105 kg)   11/29/21 0230 (!) 122/56 98.2 °F (36.8 °C) -- 60 16 97 % --   11/28/21 2226 (!) 129/58 98.1 °F (36.7 °C) Temporal 61 16 97 % --   11/28/21 1708 128/66 98 °F (36.7 °C) Oral 71 16 99 % --   11/28/21 1244 130/61 97.8 °F (36.6 °C) Temporal 60 14 100 % --       Intake/Output Summary (Last 24 hours) at 11/29/2021 1032  Last data filed at 11/28/2021 2020  Gross per 24 hour   Intake 60 ml   Output 750 ml   Net -690 ml     General: awake/alert   HEENT: Normocephalic atraumatic head. Neck: Supple with no JVD or carotid bruits. Chest:  clear to auscultation bilaterally  CVS: regular rate and rhythm  Abdominal: soft, nontender, normal bowel sounds  Extremities: Trace pedal edema. Skin: warm and dry without rash    Labs:  BMP:   Recent Labs     11/27/21 0219 11/28/21 0343 11/29/21 0124   * 141 140   K 3.6 3.6 3.4*   CL 93* 98 98   CO2 28 28 30*   BUN 49* 52* 49*   CREATININE 2.9* 2.8* 2.4*   CALCIUM 8.7* 8.6* 8.4*     CBC:   Recent Labs     11/27/21 0219 11/28/21 0343 11/29/21  0124   WBC 9.1 9.4 8.8   HGB 10.5* 10.7* 10.3*   HCT 34.1* 33.6* 33.0*   MCV 91.2 91.3 91.4    117* 126*     LIVER PROFILE:   Recent Labs     11/27/21 0219 11/28/21 0343 11/29/21  0124   AST 10 11 12   ALT 12 13 14   BILITOT 0.6 0.8 0.8   ALKPHOS 90 76 72     PT/INR: No results for input(s): PROTIME, INR in the last 72 hours. APTT: No results for input(s): APTT in the last 72 hours. BNP:  No results for input(s): BNP in the last 72 hours. Ionized Calcium:No results for input(s): IONCA in the last 72 hours. Magnesium:  Recent Labs     11/27/21 0219 11/28/21 0343 11/29/21  0124   MG 2.0 2.2 2.2     Phosphorus:No results for input(s): PHOS in the last 72 hours. HgbA1C: No results for input(s): LABA1C in the last 72 hours. Hepatic:   Recent Labs     11/27/21  0219 11/28/21  0343 11/29/21  0124   ALKPHOS 90 76 72   ALT 12 13 14   AST 10 11 12   PROT 5.8* 5.4* 5.0*   BILITOT 0.6 0.8 0.8   LABALBU 3.9 3.6 3.5     Lactic Acid: No results for input(s): LACTA in the last 72 hours.   Troponin: No results for input(s): CKTOTAL, CKMB, TROPONINT in the last 72 hours. ABGs:   Lab Results   Component Value Date    PHART 7.450 11/24/2021    PO2ART 72.0 11/24/2021    KCS1DUO 40.0 11/24/2021     CRP:  No results for input(s): CRP in the last 72 hours. Sed Rate:  No results for input(s): SEDRATE in the last 72 hours. Culture Results:   Blood Culture Recent:   Recent Labs     11/23/21  1647   BC No growth after 5 days of incubation. Urine Culture Recent : No results for input(s): LABURIN in the last 720 hours. Radiology reports as per the Radiologist  Radiology: XR ABDOMEN (KUB) (SINGLE AP VIEW)    Result Date: 11/8/2021  XR ABDOMEN (KUB) (SINGLE AP VIEW) 11/8/2021 3:19 PM HISTORY: K59.00 COMPARISON: CT scan dated 7/31/2020 FINDINGS: There is a nonobstructive bowel gas pattern. Extensive colonic stool, with colon measuring up to 9 cm in diameter. No pathologic calcification or organomegaly is visualized. Evaluation for free intraperitoneal air is limited on a supine radiograph, but there are no definite findings of free intraperitoneal air. Spinal scoliotic curvature with advanced lumbar spondylosis. Incidentally noted vascular stents in both iliac artery systems. Surgical clips in both inguinal regions. 1. Quite extensive stool diffusely throughout the colon, appearance suggesting constipation. Colon measuring up to 9 cm diameter. Signed by Dr Chana Her    Result Date: 11/23/2021  EXAMINATION: XR CHEST PORTABLE 11/23/2021 7:05 PM HISTORY: ET tube placement Single view the chest obtained compared prior exam the same date. Bilateral parahilar opacities are present consistent with pulmonary edema. Cardiac silhouettes mildly enlarged. Endotracheal tube is satisfactorily positioned. Pacing device is present in the soft tissues of the left chest.    Mild cardiomegaly and changes of parahilar interstitial pulmonary edema unchanged in one hour.  2. Endotracheal tube satisfactorily positioned Signed by Dr Willis Mcdaniel Lorena    XR CHEST PORTABLE    Result Date: 11/23/2021  EXAM: XR CHEST PORTABLE -- 11/23/2021 4:43 PM HISTORY: 81 years, Male, dyspnea COMPARISON: 8/26/2021 TECHNIQUE:  2 images. Frontal view of the chest. FINDINGS:  No pneumothorax. Bilateral perihilar and interstitial opacities. Possible layering left pleural effusion. No large right pleural effusion. Cardiac mediastinal silhouette similar to prior. Calcified aortic atherosclerosis. No acute bony finding. Left chest cardiac pulse generator with 2 grossly intact leads. 1. Findings most suggestive of pulmonary edema. Signed by Dr Jeremy Guerra    EGD    Result Date: 11/18/2021  No dictation        Assessment   1. Stage IV chronic kidney disease baseline. 2.  Type II diabetic nephropathy. 3.  Acute on chronic CHF improving. 4.  Acute respiratory failure improving. 5.  Hypokalemia. 6.  COPD exacerbation. Plan:  1. Continue intravenous diuretics. 2.  Potassium supplement. 3.  IV steroids.     Abbie Manning MD  11/29/21  10:32 AM

## 2021-11-29 NOTE — PROGRESS NOTES
Hospitalist Progress Note  [facility]     Patient: Lorena Mcneal  : 1940  MRN: [id number]  Code Status: Full Code    Hospital Day: 6   Date of Service: 2021    Subjective:   Patient seen and examined this a.m. resting in bed, patient states he is having some lower back pain, did not have a bowel movement yesterday. Currently denying headache, change in vision, chest pain, fevers or chills. Cumulative Hospital course: Patient admitted 623, 41-year-old  male with a past medical history of chronic obstructive pulmonary disease, hypertension, hyperlipidemia, GERD, peripheral vascular disease, type 2 diabetes, CKD stage IV admitted to ICU intubated and on mechanical ventilation due to acute decompensated heart failure as well as respiratory distress. Patient continues on diuresis, nephrology/cardiology following. Continues on doxycycline/methylprednisolone. TTE completed revealing grade 2 diastolic dysfunction with preserved ejection fraction, mild mitral regurgitation. Bowel regimen optimized. KUB.     Past Medical History:   Diagnosis Date    Acute liver failure without hepatic coma 10/23/2018    Back pain     \"with tired legs as a result\"    Bladder cancer ([address] 2018    Blood circulation, collateral     Carotid arterial disease (HCC)     recent surgery    CKD (chronic kidney disease), 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/2018    COPD with acute lower respiratory infection ([address] 2021    Epigastric discomfort     GERD (gastroesophageal reflux disease)     Hyperlipidemia     Hypertension     Hypertension     Palliative care patient 10/23/2018    Pneumonia due to infectious organism 2018    Primary osteoarthritis of left knee 10/14/2018    PVD [facility]peripheral vascular disease) [facility])     Tremor     Tremor on Right side x 1-2 weeks per stepdaughter    Type 2 diabetes mellitus with complication, without long-term current use of insulin ([address] 2021 Past Surgical History:   Procedure Laterality Date    BACK SURGERY      CARDIAC CATHETERIZATION  03/23/2021    100% RCA    COLONOSCOPY  2007?  CYSTOSCOPY Bilateral 12/19/2018    CYSTOSCOPY, BIOSPY FULGURATION OF BLADDER TUMOR POSSIBLE TURBT, RETROGRADE PYELOGRAM performed by Jairon Amaro MD at [address] COLONOSCOPY FLX DX W/COLLJ SPEC WHEN PFRMD N/A 09/11/2017    Dr Jalen Sumner internal hemorrhoids, diverticular disease-HP-No recall (age)   Brenda Redman [place] REVISE MEDIAN N/CARPAL TUNNEL SURG Left 07/18/2018    OPEN CARPAL TUNNEL RELEASE performed by Kenyetta Duffy MD at [address] Left 08/28/2018    LEFT CAROTID ENDARTERECTOMY WITH VEIN PATCH ANGIOPLASTY AND COMPLETION ANGIOGRAM performed by Alexsander Mei MD at [address] Left 10/15/2018    LEFT COMPLEX TOTAL KNEE ARTHROPLASTY performed by Kenyetta Duffy MD at [address] ENDOSCOPY N/A 11/18/2021    Dr Geoff Burciaga, Sub prep lg amount of solid and semisolid food/Medication in stomach body/antrum/pylorus, stomach lumen appeared to distended w air insufflation and collapse upon suction,   inadequate exam sugg of gastroparesis, repeat in 10-14 days    VASCULAR SURGERY  04/21/2015    Harrison BESS Ultrasound guided access of left common femoral artery. Aortogram.Diagnostic right lower extremity arteriogram.Radiologic supervision and interpretation.  VASCULAR SURGERY  01/13/2015    Harrison Jha M.D Atherectomy,angioplasty,and stenting of left superficial femoral artery.  VASCULAR SURGERY  03/11/2014    Harrison Jha M.D. Ultrasound-guided access of right common femoral artery. Aortogram.Left lower extremity arteriogram.Atherectomy and angioplasty of left superficial femoral artery. Radiologic supervision and interpretation.  VASCULAR SURGERY  01/18/2013    Harrison BESS Aortogram.Multistation arteriogram right lower extremity. Laser atherectomy and angioplasty of right superficial femoral artery. Selective catheterization of right tibioperoneal trunk. Angioplasty of peroneal artery and tibioperoneal trunk.  VASCULAR SURGERY  10/30/2018    [facility]. Ultrasound guided cannulation of right internal vein. Placement of right internal jugular vein tunneled dialysis catheter bard tian xk 23cm tip to cuff    VASCULAR SURGERY  2018    [facility]. Removal of tunneled dilaysis catheter right internal jugular vein. Family History   Problem Relation Age of Onset    Colon Cancer Father     Diabetes Brother     Colon Polyps Neg Hx     Liver Cancer Neg Hx     Liver Disease Neg Hx     Esophageal Cancer Neg Hx     Rectal Cancer Neg Hx     Stomach Cancer Neg Hx        Social History     Socioeconomic History    Marital status:      Spouse name: Not on file    Number of children: Not on file    Years of education: Not on file    Highest education level: Not on file   Occupational History    Not on file   Tobacco Use    Smoking status: Former Smoker     Quit date: 6/3/2003     Years since quittin.5    Smokeless tobacco: Never Used   Vaping Use    Vaping Use: Never used   Substance and Sexual Activity    Alcohol use: Yes     Alcohol/week: 12.0 standard drinks     Types: 12 Glasses of wine per week     Comment: 2 glasses of wine every night    Drug use: No    Sexual activity: Yes     Partners: Female   Other Topics Concern    Not on file   Social History Narrative    Not on file     Social Determinants of Health     Financial Resource Strain:     Difficulty of Paying Living Expenses: Not on file   Food Insecurity:     Worried About Running Out of Food in the Last Year: Not on file    Ankit of Food in the Last Year: Not on file   Transportation Needs:     Lack of Transportation (Medical): Not on file    Lack of Transportation (Non-Medical):  Not on file   Physical Activity:     Days of Exercise per Week: Not on file    Minutes of Exercise per Session: Not on file   Stress:     Feeling of Stress : Not on file   Social Connections:     Frequency of Communication with Friends and Family: Not on file    Frequency of Social Gatherings with Friends and Family: Not on file    Attends Islam Services: Not on file    Active Member of Clubs or Organizations: Not on file    Attends Club or Organization Meetings: Not on file    Marital Status: Not on file   Intimate Partner Violence:     Fear of Current or Ex-Partner: Not on file    Emotionally Abused: Not on file    Physically Abused: Not on file    Sexually Abused: Not on file   Housing Stability:     Unable to Pay for Housing in the Last Year: Not on file    Number of [place] in the Last Year: Not on file    Unstable Housing in the Last Year: Not on file       Current Facility-Administered Medications   Medication Dose Route Frequency Provider Last Rate Last Admin    bisacodyl (DULCOLAX) suppository 10 mg  10 mg Rectal Daily Ray Morelos MD   10 mg at 11/29/21 0935    polyethylene glycol (GLYCOLAX) packet 17 g  17 g Oral Daily Ray Morelos MD   17 g at 11/29/21 0935    magnesium citrate solution 296 mL  296 mL Oral Once Ray Morelos MD        methylPREDNISolone sodium (SOLU-MEDROL) injection 40 mg  40 mg IntraVENous Q12H Ray Morelos MD        [facility]) tablet 8.6 mg  1 tablet Oral Nightly Ray Morelos MD   8.6 mg at 11/28/21 2118    doxycycline hyclate (VIBRAMYCIN) capsule 100 mg  100 mg Oral 2 times per day Ray Morelos MD   100 mg at 11/29/21 0936    glycerin (ADULT) suppository 2 g  1 suppository Rectal Once Lisa Naik MD        docusate sodium (COLACE) capsule 100 mg  100 mg Oral BID Evi Bowers PA-C   100 mg at 11/29/21 6126    amiodarone (CORDARONE) tablet 200 mg  200 mg Oral Daily iNkole Zayas MD   200 mg at 11/29/21 0936    aspirin EC tablet 81 mg  81 mg Oral Daily Nikole Zayas MD   92 mg at 11/29/21 0936    atorvastatin (LIPITOR) tablet 20 mg  20 mg Oral Daily Betina Benjamin MD   20 mg at 11/29/21 0936    [Held by provider] bumetanide (BUMEX) tablet 1 mg  1 mg Oral Daily Betina Benjaimn MD        ferrous sulfate (IRON 325) tablet 325 mg  325 mg Oral BID  Betina Benjamin MD   325 mg at 59/21/77 6002    folic acid (FOLVITE) tablet 1 mg  1 mg Oral Daily Betina Benjamin MD   1 mg at 11/29/21 0935    metoclopramide (REGLAN) tablet 5 mg  5 mg Oral TID Betina Benjamin MD   5 mg at 11/29/21 0936    metoprolol succinate (TOPROL XL) extended release tablet 100 mg  100 mg Oral Daily Betina Benjamin MD   100 mg at 11/29/21 0934    NIFEdipine (PROCARDIA XL) extended release tablet 60 mg  60 mg Oral Daily Betina Benjamin MD   60 mg at 11/29/21 0937    tamsulosin (FLOMAX) capsule 0.4 mg  0.4 mg Oral BID Betina Benjamin MD   0.4 mg at 11/29/21 1924    trospium (SANCTURA) tablet 20 mg  20 mg Oral Daily Betina Benjamin MD   20 mg at 11/29/21 0937    insulin lispro (HUMALOG) injection vial 0-6 Units  0-6 Units SubCUTAneous TID  Betina Benjamin MD   2 Units at 11/28/21 1814    insulin lispro (HUMALOG) injection vial 0-3 Units  0-3 Units SubCUTAneous Nightly Betina Benjamin MD   1 Units at 11/27/21 2111    ondansetron (ZOFRAN-ODT) disintegrating tablet 4 mg  4 mg Oral Q8H PRN Betina Benjamin MD        Or    ondansetron [facility]) injection 4 mg  4 mg IntraVENous Q6H PRN Betina Benjamin MD        acetaminophen (TYLENOL) tablet 650 mg  650 mg Oral Q6H PRN Betina Benjamin MD   650 mg at 11/29/21 9390    Or    acetaminophen (TYLENOL) suppository 650 mg  650 mg Rectal Q6H PRN Betina Benjamin MD        enoxaparin (LOVENOX) injection 30 mg  30 mg SubCUTAneous Q24H Betina Benjamin MD   30 mg at 11/28/21 2118    furosemide (LASIX) injection 40 mg  40 mg IntraVENous BID Betina Benjamin MD   40 mg at 11/28/21 1000    ipratropium-albuterol (DUONEB) nebulizer solution 1 ampule  1 ampule Inhalation Q4H Betina Benjamin MD   1 ampule at 11/29/21 0740    glucose (GLUTOSE) 40 % oral gel 15 g  15 g Oral PRN Ada Quintero MD        dextrose 50 % IV solution  12.5 g IntraVENous PRN Ada Quintero MD        glucagon (rDNA) injection 1 mg  1 mg IntraMUSCular PRN Ada Quintero MD        dextrose 5 % solution  100 mL/hr IntraVENous PRN Ada Quintero MD        famotidine (PEPCID) injection 20 mg  20 mg IntraVENous Q24H Ada Quintero MD   20 mg at 11/26/21 2051    thiamine mononitrate tablet 100 mg  100 mg Oral Daily Ada Quintero MD   100 mg at 11/29/21 0935    multivitamin 1 tablet  1 tablet Oral Daily Ada Quintero MD   1 tablet at 11/29/21 0936    LORazepam (ATIVAN) tablet 1 mg  1 mg Oral Q1H PRN Ada Quintero MD        Or    LORazepam (ATIVAN) injection 1 mg  1 mg IntraVENous Q1H PRN Ada Quintero MD   1 mg at 11/26/21 3107    Or    LORazepam (ATIVAN) tablet 2 mg  2 mg Oral Q1H PRN Ada Quintero MD        Or    LORazepam (ATIVAN) injection 2 mg  2 mg IntraVENous Q1H PRN Ada Quintero MD             dextrose          ROS: 14 point review of systems is negative except as specifically addressed above. Objective:   /60   Pulse 59   Temp 97.9 °F (36.6 °C) (Temporal)   Resp 16   Ht 6' (1.829 m)   Wt 231 lb 6.4 oz (105 kg)   SpO2 96%   BMI 31.38 kg/m²     Physical Exam  Vitals reviewed. Constitutional:       Appearance: He is obese. He is not ill-appearing or toxic-appearing. HENT:      Head: Normocephalic and atraumatic. Nose: Nose normal.   Eyes:      Conjunctiva/sclera: Conjunctivae normal.   Cardiovascular:      Rate and Rhythm: Normal rate. Rhythm irregular. Pulses: Normal pulses. Pulmonary:      Comments: Nasal cannula oxygen in place, slight diminishment of breath sounds bilaterally upon auscultation  Abdominal:      General: Bowel sounds are normal. There is distension. Tenderness: There is no guarding or rebound.    Genitourinary:     Comments: Catheter in place  Musculoskeletal: Cervical back: Normal range of motion. Comments: 1+ edema bilateral lower extremities   Skin:     Findings: Bruising present. Comments: Chronic venous stasis changes of the legs bilaterally   Neurological:      Mental Status: He is alert and oriented to person, place, and time. Mental status is at baseline. Motor: Weakness present. Psychiatric:         Mood and Affect: Mood normal.           Recent Labs     11/27/21 0219 11/28/21 0343 11/29/21  0124   WBC 9.1 9.4 8.8   RBC 3.74* 3.68* 3.61*   HGB 10.5* 10.7* 10.3*   HCT 34.1* 33.6* 33.0*   MCV 91.2 91.3 91.4   MCH 28.1 29.1 28.5   MCHC 30.8* 31.8* 31.2*    117* 126*     Recent Labs     11/27/21 0219 11/28/21 0343 11/29/21  0124   * 141 140   K 3.6 3.6 3.4*   ANIONGAP 13 15 12   CL 93* 98 98   CO2 28 28 30*   BUN 49* 52* 49*   CREATININE 2.9* 2.8* 2.4*   GLUCOSE 198* 186* 171*   CALCIUM 8.7* 8.6* 8.4*     Recent Labs     11/27/21 0219 11/28/21  0343 11/29/21  0124   MG 2.0 2.2 2.2     Recent Labs     11/27/21 0219 11/28/21  0343 11/29/21  0124   AST 10 11 12   ALT 12 13 14   BILITOT 0.6 0.8 0.8   ALKPHOS 90 76 72     No results for input(s): PH, PO2, PCO2, HCO3, BE, O2SAT in the last 72 hours. No results for input(s): TROPONINI in the last 72 hours. No results for input(s): INR in the last 72 hours. No results for input(s): LACTA in the last 72 hours. Intake/Output Summary (Last 24 hours) at 11/29/2021 1047  Last data filed at 11/28/2021 2020  Gross per 24 hour   Intake 60 ml   Output 750 ml   Net -690 ml       XR CHEST PORTABLE    Result Date: 11/23/2021  EXAMINATION: XR CHEST PORTABLE 11/23/2021 7:05 PM HISTORY: ET tube placement Single view the chest obtained compared prior exam the same date. Bilateral parahilar opacities are present consistent with pulmonary edema. Cardiac silhouettes mildly enlarged. Endotracheal tube is satisfactorily positioned.  Pacing device is present in the soft tissues of the left chest.    Mild cardiomegaly and changes of parahilar interstitial pulmonary edema unchanged in one hour. 2. Endotracheal tube satisfactorily positioned Signed by Dr Beulah Minaya    XR CHEST PORTABLE    Result Date: 11/23/2021  EXAM: XR CHEST PORTABLE -- 11/23/2021 4:43 PM HISTORY: 81 years, Male, dyspnea COMPARISON: 8/26/2021 TECHNIQUE:  2 images. Frontal view of the chest. FINDINGS:  No pneumothorax. Bilateral perihilar and interstitial opacities. Possible layering left pleural effusion. No large right pleural effusion. Cardiac mediastinal silhouette similar to prior. Calcified aortic atherosclerosis. No acute bony finding. Left chest cardiac pulse generator with 2 grossly intact leads. 1. Findings most suggestive of pulmonary edema. Signed by Dr Nilton Dinero and Plan:     Acute on chronic decompensated diastolic congestive heart failure  -TTE reviewed, evidence of grade 2 diastolic dysfunction  -Cardiology consulted and following  -Continue to monitor I/os (cumulatively negative)  -Low-sodium diet  -In addition to p.o.  Lasix, remove Shultz catheter      Acute respiratory failure with hypoxia/COPD with acute exacerbation  -Patient intubated ultimately extubated 11/24 required ICU  -Continue with diuresis as noted above  -Doxycycline/transition to p.o. steroids  -Continue to saturating 90%  -Nhan      Constipation  -Bowel regimen optimization  -KUB    Hypokalemia  -Supplementation provided      CKD stage IV  -Condition, currently stable  -Renally dose medication, avoid nephrotoxic agents  -Neurology consulted, appreciate recommendations       Chronic atrial fibrillation  -Currently rate controlled, continue with home regimen, per cardiology patient will be benefited by oral anticoagulation upon DC  -Continue correct electrolytes      History of alcohol dependence  -Chronic condition, monitor for withdrawal, daily thiamine, folic acid, multivitamin  -Monitor and correct electrolytes      Type 2 diabetes  -Chronic condition, hold oral antihyperglycemic's, continue with sliding scale modalities as well as Premeal insulin, Accu-Cheks q. ACH S, hypoglycemic protocol             DVT prophylaxis-Lovenox  GI prophylaxis-Pepcid  Discharge disposition -anticipate in the next 24 to 48 hours, home with home health services      EMR Dragon/Transcription disclaimer:   Much of this encounter note is an electronic transcription/translation of spoken language to printed text.  The electronic translation of spoken language may permit erroneous, or at times, nonsensical words or phrases to be inadvertently transcribed; although attempts have made to review the note for such errors, some may still exist.    Electronically signed by   Kostas Medina MD   Internal Medicine Hospitalist  On 11/29/2021  At 10:47 AM

## 2021-11-29 NOTE — PROGRESS NOTES
11/29/21 1201   Subjective   Subjective I am supposed to go for x-Ray. If I get up  I will have to get back in bed. Well I asked to have pain meds. I just don't want to get up now.    Physical Therapy    Electronically signed by Edy Ryan PTA on 11/29/2021 at 12:04 PM

## 2021-11-30 VITALS
OXYGEN SATURATION: 96 % | WEIGHT: 229.5 LBS | HEART RATE: 58 BPM | TEMPERATURE: 97 F | HEIGHT: 72 IN | SYSTOLIC BLOOD PRESSURE: 141 MMHG | RESPIRATION RATE: 16 BRPM | BODY MASS INDEX: 31.08 KG/M2 | DIASTOLIC BLOOD PRESSURE: 64 MMHG

## 2021-11-30 LAB
ALBUMIN SERPL-MCNC: 3.4 G/DL (ref 3.5–5.2)
ALP BLD-CCNC: 72 U/L (ref 40–130)
ALT SERPL-CCNC: 13 U/L (ref 5–41)
ANION GAP SERPL CALCULATED.3IONS-SCNC: 12 MMOL/L (ref 7–19)
AST SERPL-CCNC: 11 U/L (ref 5–40)
BILIRUB SERPL-MCNC: 0.7 MG/DL (ref 0.2–1.2)
BUN BLDV-MCNC: 46 MG/DL (ref 8–23)
CALCIUM SERPL-MCNC: 8.7 MG/DL (ref 8.8–10.2)
CHLORIDE BLD-SCNC: 98 MMOL/L (ref 98–111)
CO2: 29 MMOL/L (ref 22–29)
CREAT SERPL-MCNC: 2.3 MG/DL (ref 0.5–1.2)
GFR AFRICAN AMERICAN: 33
GFR NON-AFRICAN AMERICAN: 27
GLUCOSE BLD-MCNC: 149 MG/DL (ref 70–99)
GLUCOSE BLD-MCNC: 159 MG/DL (ref 70–99)
GLUCOSE BLD-MCNC: 191 MG/DL (ref 74–109)
HCT VFR BLD CALC: 35.5 % (ref 42–52)
HEMOGLOBIN: 11 G/DL (ref 14–18)
MAGNESIUM: 2.4 MG/DL (ref 1.6–2.4)
MCH RBC QN AUTO: 27.9 PG (ref 27–31)
MCHC RBC AUTO-ENTMCNC: 31 G/DL (ref 33–37)
MCV RBC AUTO: 90.1 FL (ref 80–94)
PDW BLD-RTO: 13.4 % (ref 11.5–14.5)
PERFORMED ON: ABNORMAL
PERFORMED ON: ABNORMAL
PLATELET # BLD: 128 K/UL (ref 130–400)
PMV BLD AUTO: 11.8 FL (ref 9.4–12.4)
POTASSIUM SERPL-SCNC: 4 MMOL/L (ref 3.5–5)
RBC # BLD: 3.94 M/UL (ref 4.7–6.1)
SODIUM BLD-SCNC: 139 MMOL/L (ref 136–145)
TOTAL PROTEIN: 5.3 G/DL (ref 6.6–8.7)
WBC # BLD: 10.3 K/UL (ref 4.8–10.8)

## 2021-11-30 PROCEDURE — 83735 ASSAY OF MAGNESIUM: CPT

## 2021-11-30 PROCEDURE — 6370000000 HC RX 637 (ALT 250 FOR IP): Performed by: FAMILY MEDICINE

## 2021-11-30 PROCEDURE — 85027 COMPLETE CBC AUTOMATED: CPT

## 2021-11-30 PROCEDURE — 6370000000 HC RX 637 (ALT 250 FOR IP): Performed by: PHYSICIAN ASSISTANT

## 2021-11-30 PROCEDURE — 6370000000 HC RX 637 (ALT 250 FOR IP): Performed by: INTERNAL MEDICINE

## 2021-11-30 PROCEDURE — 36415 COLL VENOUS BLD VENIPUNCTURE: CPT

## 2021-11-30 PROCEDURE — 2700000000 HC OXYGEN THERAPY PER DAY

## 2021-11-30 PROCEDURE — 94640 AIRWAY INHALATION TREATMENT: CPT

## 2021-11-30 PROCEDURE — 82947 ASSAY GLUCOSE BLOOD QUANT: CPT

## 2021-11-30 PROCEDURE — 80053 COMPREHEN METABOLIC PANEL: CPT

## 2021-11-30 RX ORDER — POLYETHYLENE GLYCOL 3350 17 G/17G
17 POWDER, FOR SOLUTION ORAL DAILY
Qty: 527 G | Refills: 1 | Status: SHIPPED | OUTPATIENT
Start: 2021-12-01 | End: 2021-12-31

## 2021-11-30 RX ADMIN — THIAMINE HCL TAB 100 MG 100 MG: 100 TAB at 09:13

## 2021-11-30 RX ADMIN — METOCLOPRAMIDE 5 MG: 10 TABLET ORAL at 09:13

## 2021-11-30 RX ADMIN — ASPIRIN 81 MG: 81 TABLET, COATED ORAL at 09:13

## 2021-11-30 RX ADMIN — DOCUSATE SODIUM 100 MG: 100 CAPSULE, LIQUID FILLED ORAL at 09:13

## 2021-11-30 RX ADMIN — TAMSULOSIN HYDROCHLORIDE 0.4 MG: 0.4 CAPSULE ORAL at 09:14

## 2021-11-30 RX ADMIN — INSULIN LISPRO 1 UNITS: 100 INJECTION, SOLUTION INTRAVENOUS; SUBCUTANEOUS at 09:12

## 2021-11-30 RX ADMIN — BUMETANIDE 1 MG: 1 TABLET ORAL at 09:14

## 2021-11-30 RX ADMIN — DOXYCYCLINE HYCLATE 100 MG: 100 CAPSULE ORAL at 09:13

## 2021-11-30 RX ADMIN — NIFEDIPINE 60 MG: 60 TABLET, EXTENDED RELEASE ORAL at 09:13

## 2021-11-30 RX ADMIN — TROSPIUM CHLORIDE 20 MG: 20 TABLET, FILM COATED ORAL at 09:13

## 2021-11-30 RX ADMIN — IPRATROPIUM BROMIDE AND ALBUTEROL SULFATE 1 AMPULE: .5; 2.5 SOLUTION RESPIRATORY (INHALATION) at 07:39

## 2021-11-30 RX ADMIN — METOPROLOL SUCCINATE 100 MG: 50 TABLET, EXTENDED RELEASE ORAL at 09:13

## 2021-11-30 RX ADMIN — FOLIC ACID 1 MG: 1 TABLET ORAL at 09:13

## 2021-11-30 RX ADMIN — ATORVASTATIN CALCIUM 20 MG: 40 TABLET, FILM COATED ORAL at 09:13

## 2021-11-30 RX ADMIN — POLYETHYLENE GLYCOL 3350 17 G: 17 POWDER, FOR SOLUTION ORAL at 09:12

## 2021-11-30 RX ADMIN — PREDNISONE 40 MG: 20 TABLET ORAL at 09:13

## 2021-11-30 RX ADMIN — AMIODARONE HYDROCHLORIDE 200 MG: 200 TABLET ORAL at 09:14

## 2021-11-30 RX ADMIN — ACETAMINOPHEN 650 MG: 325 TABLET ORAL at 05:35

## 2021-11-30 RX ADMIN — FERROUS SULFATE TAB 325 MG (65 MG ELEMENTAL FE) 325 MG: 325 (65 FE) TAB at 09:13

## 2021-11-30 RX ADMIN — IPRATROPIUM BROMIDE AND ALBUTEROL SULFATE 1 AMPULE: .5; 2.5 SOLUTION RESPIRATORY (INHALATION) at 10:47

## 2021-11-30 RX ADMIN — THERA TABS 1 TABLET: TAB at 09:13

## 2021-11-30 ASSESSMENT — PAIN DESCRIPTION - ORIENTATION: ORIENTATION: POSTERIOR

## 2021-11-30 ASSESSMENT — PAIN DESCRIPTION - ONSET: ONSET: UNABLE TO TELL

## 2021-11-30 ASSESSMENT — PAIN DESCRIPTION - PAIN TYPE: TYPE: ACUTE PAIN

## 2021-11-30 ASSESSMENT — PAIN - FUNCTIONAL ASSESSMENT: PAIN_FUNCTIONAL_ASSESSMENT: ACTIVITIES ARE NOT PREVENTED

## 2021-11-30 ASSESSMENT — PAIN DESCRIPTION - PROGRESSION: CLINICAL_PROGRESSION: GRADUALLY WORSENING

## 2021-11-30 ASSESSMENT — PAIN DESCRIPTION - FREQUENCY: FREQUENCY: INTERMITTENT

## 2021-11-30 ASSESSMENT — PAIN SCALES - GENERAL: PAINLEVEL_OUTOF10: 3

## 2021-11-30 ASSESSMENT — PAIN DESCRIPTION - LOCATION: LOCATION: HEAD

## 2021-11-30 ASSESSMENT — PAIN DESCRIPTION - DESCRIPTORS: DESCRIPTORS: HEADACHE

## 2021-11-30 NOTE — CARE COORDINATION
Spoke with patient regarding MD orders for [facility] services. Patient agreeable and has chosen [facility]. Referral Faxed. [address] [place] [address] [phone]. [phone]. Please notify [address] [place] [address] when patient discharges and fax DC Summary,  DC med list and any new [facility] orders. The Patient and/or patient representative was provided with a choice of provider and agrees   with the discharge plan. [x] Yes [] No    Freedom of choice list was provided with basic dialogue that supports the patient's individualized plan of care/goals, treatment preferences and shares the quality data associated with the providers.  [x] Yes [] No  Electronically signed by Varun Parker on 11/30/2021 at 10:04 AM

## 2021-11-30 NOTE — DISCHARGE INSTR - DIET
Good nutrition is important when healing from an illness, injury, or surgery. Follow any nutrition recommendations given to you during your hospital stay. If you were given an oral nutrition supplement while in the hospital, continue to take this supplement at home. You can take it with meals, in-between meals, and/or before bedtime. These supplements can be purchased at most local grocery stores, pharmacies, and chain [employer]stores. If you have any questions about your diet or nutrition, call the hospital and ask for the dietitian.   Regular 3 carb choices low sodium diet

## 2021-11-30 NOTE — PROGRESS NOTES
Nephrology ([facility]) Progress Note    Patient:  Ludivina Solorio  YOB: 1940  Date of Service: 11/30/2021  MRN: [id number]   Acct: [de-identified]   Primary Care Physician: Wayne Ding MD  Advance Directive: Full Code  Admit Date: 11/23/2021       Hospital Day: 7  Referring Provider: Lisa Maza MD    Patient independently seen and examined, Chart, Consults, Notes, Operative notes, Labs, Cardiology, and Radiology studies reviewed as available. Chief complaint: Abnormal labs. Subjective:  Ludivina Solorio is a [de-identified] y.o. male for whom we were consulted for evaluation and treatment of acute kidney injury/chronic kidney disease. Patient had 1 episode of acute kidney injury in the past needing  short-term hemodialysis and finally had partial renal recovery with stage IV chronic kidney disease. His estimated GFR nearly 18 mm. He follows Dr. Aidee Jose in the office. Patient also has history of type 2 diabetes, hypertension, congestive heart failure and osteoarthritis. Presented with increasing shortness of breath and was diagnosed with congestive heart failure exacerbation. This morning he feels well. He is complaining of constipation which was relieved by enema.     Allergies:  Eliquis [apixaban] and Promethazine hcl    Medicines:  Current Facility-Administered Medications   Medication Dose Route Frequency Provider Last Rate Last Admin    bisacodyl (DULCOLAX) suppository 10 mg  10 mg Rectal Daily Lisa Maza MD   10 mg at 11/29/21 0935    polyethylene glycol (GLYCOLAX) packet 17 g  17 g Oral Daily Lisa Maza MD   17 g at 11/30/21 0912    predniSONE (DELTASONE) tablet 40 mg  40 mg Oral Daily Lisa Maza MD   40 mg at 11/30/21 0913    senna (SENOKOT) tablet 8.6 mg  1 tablet Oral Nightly Lisa Maza MD   8.6 mg at 11/29/21 1948    glycerin (ADULT) suppository 2 g  1 suppository Rectal Once Mello Knight MD        docusate sodium (COLACE) capsule 100 mg  100 mg Oral BID Yulissa Dolan PA-C   100 mg at 11/30/21 7704    amiodarone (CORDARONE) tablet 200 mg  200 mg Oral Daily Milan Wilson MD   200 mg at 11/30/21 0914    aspirin EC tablet 81 mg  81 mg Oral Daily Milan Wilson MD   81 mg at 11/30/21 0913    atorvastatin (LIPITOR) tablet 20 mg  20 mg Oral Daily Milan Wilson MD   20 mg at 11/30/21 0913    bumetanide (BUMEX) tablet 1 mg  1 mg Oral Daily Milan Wilson MD   1 mg at 11/30/21 8445    ferrous sulfate (IRON 325) tablet 325 mg  325 mg Oral BID  Milan Wilson MD   325 mg at 93/87/40 8950    folic acid (FOLVITE) tablet 1 mg  1 mg Oral Daily Milan Wilson MD   1 mg at 11/30/21 0913    metoclopramide (REGLAN) tablet 5 mg  5 mg Oral TID Milan Wilson MD   5 mg at 11/30/21 0913    metoprolol succinate (TOPROL XL) extended release tablet 100 mg  100 mg Oral Daily Milan Wilson MD   100 mg at 11/30/21 0913    NIFEdipine (PROCARDIA XL) extended release tablet 60 mg  60 mg Oral Daily Milan Wilson MD   60 mg at 11/30/21 0913    tamsulosin (FLOMAX) capsule 0.4 mg  0.4 mg Oral BID Milan Wilson MD   0.4 mg at 11/30/21 5421    trospium (SANCTURA) tablet 20 mg  20 mg Oral Daily Milan Wilson MD   20 mg at 11/30/21 0913    insulin lispro (HUMALOG) injection vial 0-6 Units  0-6 Units SubCUTAneous TID  Milan Wilson MD   1 Units at 11/30/21 0912    insulin lispro (HUMALOG) injection vial 0-3 Units  0-3 Units SubCUTAneous Nightly Milan Wilson MD   2 Units at 11/29/21 2045    ondansetron (ZOFRAN-ODT) disintegrating tablet 4 mg  4 mg Oral Q8H PRN Milan Wilson MD        Or    [facility] injection 4 mg  4 mg IntraVENous Q6H PRN Milan Wilson MD        acetaminophen (TYLENOL) tablet 650 mg  650 mg Oral Q6H PRN Milan Wilson MD   650 mg at 11/30/21 0535    Or    acetaminophen (TYLENOL) suppository 650 mg  650 mg Rectal Q6H PRN Milan Wilson MD        enoxaparin (LOVENOX) injection 30 mg  30 mg SubCUTAneous Q24H Amanda Dozier Federico Lee MD   30 mg at 11/29/21 1947    ipratropium-albuterol (DUONEB) nebulizer solution 1 ampule  1 ampule Inhalation Q4H Juancarlos Arzate MD   1 ampule at 11/30/21 0739    glucose (GLUTOSE) 40 % oral gel 15 g  15 g Oral PRN Juancarlos Arzate MD        dextrose 50 % IV solution  12.5 g IntraVENous PRN Juancarlos Arzate MD        glucagon (rDNA) injection 1 mg  1 mg IntraMUSCular PRN Juancarlos Arzate MD        dextrose 5 % solution  100 mL/hr IntraVENous PRN Juancarlos Arzate MD        famotidine (PEPCID) injection 20 mg  20 mg IntraVENous Q24H Juancarlos Arzate MD   20 mg at 11/29/21 2151    thiamine mononitrate tablet 100 mg  100 mg Oral Daily Juancarlos Arzate MD   100 mg at 11/30/21 0913    multivitamin 1 tablet  1 tablet Oral Daily Juancarlos Arzate MD   1 tablet at 11/30/21 0913    LORazepam (ATIVAN) tablet 1 mg  1 mg Oral Q1H PRN Juancarlos Arzate MD        Or    LORazepam (ATIVAN) injection 1 mg  1 mg IntraVENous Q1H PRN Juancarlos Arzate MD   1 mg at 11/26/21 0836    Or    LORazepam (ATIVAN) tablet 2 mg  2 mg Oral Q1H PRN Juancarlos Arzate MD        Or    LORazepam (ATIVAN) injection 2 mg  2 mg IntraVENous Q1H PRN Juancarlos Arzate MD           Past Medical History:  Past Medical History:   Diagnosis Date    Acute liver failure without hepatic coma 10/23/2018    Back pain     \"with tired legs as a result\"    Bladder cancer ([facility] [address] 12/19/2018    Blood circulation, collateral     Carotid arterial disease ([address]     recent surgery    CKD (chronic kidney disease), 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/2018    COPD with acute lower respiratory infection ([address] 02/24/2021    Epigastric discomfort     GERD (gastroesophageal reflux disease)     Hyperlipidemia     Hypertension     Hypertension     Palliative care patient 10/23/2018    Pneumonia due to infectious organism 11/06/2018    Primary osteoarthritis of left knee 10/14/2018    PVD (peripheral vascular disease) (HCC)     Tremor     Tremor on Right side x 1-2 weeks per stepdaughter    Type 2 diabetes mellitus with complication, without long-term current use of insulin ([facility] [address] 01/21/2021       Past Surgical History:  Past Surgical History:   Procedure Laterality Date    BACK SURGERY      CARDIAC CATHETERIZATION  03/23/2021    100% RCA    COLONOSCOPY  2007?  CYSTOSCOPY Bilateral 12/19/2018    CYSTOSCOPY, BIOSPY FULGURATION OF BLADDER TUMOR POSSIBLE TURBT, RETROGRADE PYELOGRAM performed by Ileana Fuentes MD at [address] COLONOSCOPY FLX DX W/COLLJ SPEC WHEN PFRMD N/A 09/11/2017    Dr Luz Maria Greer internal hemorrhoids, diverticular disease-HP-No recall (age)   Warden Harrison [place] REVISE MEDIAN N/CARPAL TUNNEL SURG Left 07/18/2018    OPEN CARPAL TUNNEL RELEASE performed by Niles Francois MD at [address] Left 08/28/2018    LEFT CAROTID ENDARTERECTOMY WITH VEIN PATCH ANGIOPLASTY AND COMPLETION ANGIOGRAM performed by Veronica Jaramillo MD at [facility]. Left 10/15/2018    LEFT COMPLEX TOTAL KNEE ARTHROPLASTY performed by Niles Francois MD at [address] [facility] ENDOSCOPY N/A 11/18/2021    Dr Robertson Loud, Sub prep lg amount of solid and semisolid food/Medication in stomach body/antrum/pylorus, stomach lumen appeared to distended w air insufflation and collapse upon suction,   inadequate exam sugg of gastroparesis, repeat in 10-14 days    VASCULAR SURGERY  04/21/2015    Sarah BESS Ultrasound guided access of left common femoral artery. Aortogram.Diagnostic right lower extremity arteriogram.Radiologic supervision and interpretation.  VASCULAR SURGERY  01/13/2015    Sarah Frankel M.D Atherectomy,angioplasty,and stenting of left superficial femoral artery.  VASCULAR SURGERY  03/11/2014    Sarah Frankel M.D. Ultrasound-guided access of right common femoral artery. Aortogram.Left lower extremity arteriogram.Atherectomy and angioplasty of left superficial femoral artery. Radiologic supervision and interpretation.  VASCULAR SURGERY  2013    Ruthie BESS Aortogram.Multistation arteriogram right lower extremity. Laser atherectomy and angioplasty of right superficial femoral artery. Selective catheterization of right tibioperoneal trunk. Angioplasty of peroneal artery and tibioperoneal trunk.  VASCULAR SURGERY  10/30/2018    [facility]. Ultrasound guided cannulation of right internal vein. Placement of right internal jugular vein tunneled dialysis catheter bard equistream xk 23cm tip to cuff    VASCULAR SURGERY  2018    [facility]. Removal of tunneled dilaysis catheter right internal jugular vein. Family History  Family History   Problem Relation Age of Onset    Colon Cancer Father     Diabetes Brother     Colon Polyps Neg Hx     Liver Cancer Neg Hx     Liver Disease Neg Hx     Esophageal Cancer Neg Hx     Rectal Cancer Neg Hx     Stomach Cancer Neg Hx        Social History  Social History     Socioeconomic History    Marital status:      Spouse name: Not on file    Number of children: Not on file    Years of education: Not on file    Highest education level: Not on file   Occupational History    Not on file   Tobacco Use    Smoking status: Former Smoker     Quit date: 6/3/2003     Years since quittin.5    Smokeless tobacco: Never Used   Vaping Use    Vaping Use: Never used   Substance and Sexual Activity    Alcohol use:  Yes     Alcohol/week: 12.0 standard drinks     Types: 12 Glasses of wine per week     Comment: 2 glasses of wine every night    Drug use: No    Sexual activity: Yes     Partners: Female   Other Topics Concern    Not on file   Social History Narrative    Not on file     Social Determinants of Health     Financial Resource Strain:     Difficulty of Paying Living Expenses: Not on file   Food Insecurity:     Worried About Running Out of Food in the Last Year: Not on file    Ankit of Food in the Last Year: Not on LINDEN Molina Needs:     Lack of Transportation (Medical): Not on file    Lack of Transportation (Non-Medical):  Not on file   Physical Activity:     Days of Exercise per Week: Not on file    Minutes of Exercise per Session: Not on file   Stress:     Feeling of Stress : Not on file   Social Connections:     Frequency of Communication with Friends and Family: Not on file    Frequency of Social Gatherings with Friends and Family: Not on file    Attends [employer] Services: Not on file    Active Member of Clubs or Organizations: Not on file    Attends Club or Organization Meetings: Not on file    Marital Status: Not on file   Intimate Partner Violence:     Fear of Current or Ex-Partner: Not on file    Emotionally Abused: Not on file    Physically Abused: Not on file    Sexually Abused: Not on file   Housing Stability:     Unable to Pay for Housing in the Last Year: Not on file    Number of Places Lived in the Last Year: Not on file    Unstable Housing in the Last Year: Not on file         Review of Systems:  History obtained from chart review and the patient  General ROS: No fever or chills  Respiratory ROS: positive for - shortness of breath  Cardiovascular ROS: No chest pain or palpitations  Gastrointestinal ROS: No abdominal pain or melena  Genito-Urinary ROS: No dysuria or hematuria  Musculoskeletal ROS: No joint pain or swelling         Objective:  Patient Vitals for the past 24 hrs:   BP Temp Temp src Pulse Resp SpO2 Weight   11/30/21 0827 (!) 141/64 97 °F (36.1 °C) -- 58 16 96 % --   11/30/21 0622 -- -- -- -- -- -- 229 lb 8 oz (104.1 kg)   11/30/21 0337 (!) 137/55 98.1 °F (36.7 °C) Temporal 60 16 98 % --   11/29/21 2025 (!) 131/56 98.6 °F (37 °C) Temporal 60 16 91 % --   11/29/21 1621 130/60 97.3 °F (36.3 °C) Temporal 66 -- (!) 86 % --       Intake/Output Summary (Last 24 hours) at 11/30/2021 1040  Last data filed at 11/30/2021 0956  Gross per 24 hour   Intake 420 ml   Output 1350 ml   Net -930 ml     General: awake/alert   HEENT: Normocephalic atraumatic head. Neck: Supple with no JVD or carotid bruits. Chest:  clear to auscultation bilaterally  CVS: regular rate and rhythm  Abdominal: soft, nontender, normal bowel sounds  Extremities: Trace pedal edema. Skin: warm and dry without rash    Labs:  BMP:   Recent Labs     11/28/21 0343 11/29/21 0124 11/30/21 0433    140 139   K 3.6 3.4* 4.0   CL 98 98 98   CO2 28 30* 29   BUN 52* 49* 46*   CREATININE 2.8* 2.4* 2.3*   CALCIUM 8.6* 8.4* 8.7*     CBC:   Recent Labs     11/28/21 0343 11/29/21 0124 11/30/21 0433   WBC 9.4 8.8 10.3   HGB 10.7* 10.3* 11.0*   HCT 33.6* 33.0* 35.5*   MCV 91.3 91.4 90.1   * 126* 128*     LIVER PROFILE:   Recent Labs     11/28/21 0343 11/29/21 0124 11/30/21 0433   AST 11 12 11   ALT 13 14 13   BILITOT 0.8 0.8 0.7   ALKPHOS 76 72 72     PT/INR: No results for input(s): PROTIME, INR in the last 72 hours. APTT: No results for input(s): APTT in the last 72 hours. BNP:  No results for input(s): BNP in the last 72 hours. Ionized Calcium:No results for input(s): IONCA in the last 72 hours. Magnesium:  Recent Labs     11/28/21 0343 11/29/21 0124 11/30/21 0433   MG 2.2 2.2 2.4     Phosphorus:No results for input(s): PHOS in the last 72 hours. HgbA1C: No results for input(s): LABA1C in the last 72 hours. Hepatic:   Recent Labs     11/28/21 0343 11/29/21 0124 11/30/21 0433   ALKPHOS 76 72 72   ALT 13 14 13   AST 11 12 11   PROT 5.4* 5.0* 5.3*   BILITOT 0.8 0.8 0.7   LABALBU 3.6 3.5 3.4*     Lactic Acid: No results for input(s): LACTA in the last 72 hours. Troponin: No results for input(s): CKTOTAL, CKMB, TROPONINT in the last 72 hours. ABGs:   Lab Results   Component Value Date    PHART 7.450 11/24/2021    PO2ART 72.0 11/24/2021    JMP0NZC 40.0 11/24/2021     CRP:  No results for input(s): CRP in the last 72 hours. Sed Rate:  No results for input(s): SEDRATE in the last 72 hours.     Culture Results:   Blood Culture Recent: Recent Labs     11/23/21  1647   BC No growth after 5 days of incubation. Urine Culture Recent : No results for input(s): LABURIN in the last 720 hours. Radiology reports as per the Radiologist  Radiology: XR ABDOMEN (KUB) (SINGLE AP VIEW)    Result Date: 11/8/2021  XR ABDOMEN (KUB) (SINGLE AP VIEW) 11/8/2021 3:19 PM HISTORY: K59.00 COMPARISON: CT scan dated 7/31/2020 FINDINGS: There is a nonobstructive bowel gas pattern. Extensive colonic stool, with colon measuring up to 9 cm in diameter. No pathologic calcification or organomegaly is visualized. Evaluation for free intraperitoneal air is limited on a supine radiograph, but there are no definite findings of free intraperitoneal air. Spinal scoliotic curvature with advanced lumbar spondylosis. Incidentally noted vascular stents in both iliac artery systems. Surgical clips in both inguinal regions. 1. Quite extensive stool diffusely throughout the colon, appearance suggesting constipation. Colon measuring up to 9 cm diameter. Signed by Dr Ale Mcleod    Result Date: 11/23/2021  EXAMINATION: XR CHEST PORTABLE 11/23/2021 7:05 PM HISTORY: ET tube placement Single view the chest obtained compared prior exam the same date. Bilateral parahilar opacities are present consistent with pulmonary edema. Cardiac silhouettes mildly enlarged. Endotracheal tube is satisfactorily positioned. Pacing device is present in the soft tissues of the left chest.    Mild cardiomegaly and changes of parahilar interstitial pulmonary edema unchanged in one hour. 2. Endotracheal tube satisfactorily positioned Signed by Dr Rhiannon Leone    XR CHEST PORTABLE    Result Date: 11/23/2021  EXAM: XR CHEST PORTABLE -- 11/23/2021 4:43 PM HISTORY: 81 years, Male, dyspnea COMPARISON: 8/26/2021 TECHNIQUE:  2 images. Frontal view of the chest. FINDINGS:  No pneumothorax. Bilateral perihilar and interstitial opacities. Possible layering left pleural effusion.  No large right pleural effusion. Cardiac mediastinal silhouette similar to prior. Calcified aortic atherosclerosis. No acute bony finding. Left chest cardiac pulse generator with 2 grossly intact leads. 1. Findings most suggestive of pulmonary edema. Signed by Dr Felix García    EGD    Result Date: 11/18/2021  No dictation        Assessment   1. Stage IV chronic kidney disease baseline. 2.  Type II diabetic nephropathy. 3.  Acute on chronic CHF improving. 4.  Acute respiratory failure/improved. 5.  Hypokalemia. 6.  COPD exacerbation. Plan:  1. Continue p.o. diuretics. 2.  Potassium supplement. 3.  P.o. prednisone.   4.  Renal function is almost back to baseline    Judith Johnson MD  11/30/21  10:40 AM

## 2021-11-30 NOTE — DISCHARGE SUMMARY
Discharge Summary  Date:11/30/2021        Patient Delores Robles     YOB: 1940     Age:81 y.o. Admit Date:11/23/2021   Admission Condition:poor   Discharged Condition:fair  Discharge Date: 11/30/21     Discharge Diagnoses   Principal Problem:    Acute decompensated heart failure ([facility] [address] [address]  Active Problems:    Essential hypertension    Palliative care patient    Bladder cancer ([facility] [address]    Type 2 diabetes mellitus with complication, without long-term current use of insulin (HCC)    Pacemaker    Chronic diastolic congestive heart failure ([facility] [address]    Acute kidney injury superimposed on CKD ([facility] [address] [facility]    COPD with exacerbation ([facility] [address]  Resolved Problems:    * No resolved hospital problems. [facility] AND [facility]   Narrative of Hospital Course: Patient admitted 623, 81-year-old  male with a past medical history of chronic obstructive pulmonary disease, hypertension, hyperlipidemia, GERD, peripheral vascular disease, type 2 diabetes, CKD stage IV admitted to ICU intubated and on mechanical ventilation due to acute decompensated heart failure as well as respiratory distress. Patient continues on diuresis, nephrology/cardiology following. Continues on doxycycline/methylprednisolone. TTE completed revealing grade 2 diastolic dysfunction with preserved ejection fraction, mild mitral regurgitation. Bowel regimen was optimized patient had a large bowel movement, KUB revealing stool retained. Patient will be discharged on optimize bowel regimen addition of MiraLAX to senna. Encouraged to continue with oral intake, ambulation as tolerated. Patient stable for discharge cleared by nephrology services. Home health has been ordered and arranged prior to patient's DC.     Consultants:   IP CONSULT TO PALLIATIVE CARE  IP CONSULT TO SOCIAL WORK  IP CONSULT TO NEPHROLOGY  IP CONSULT TO CARDIOLOGY  IP CONSULT TO HOME CARE NEEDS    Time Spent on Discharge:  45  minutes were spent in patient examination, evaluation, counseling as well as medication reconciliation, prescriptions for required medications, discharge plan and follow up.       Surgeries/Procedures Performed:       Treatments:   antibiotics: Doxycycline, analgesia: acetaminophen, cardiac meds: Amiodarone, Lipitor, Bumex, Toprol-XL, nifedipine, anticoagulation: LMW heparin, steroids: prednisone, insulin: Humalog, respiratory therapy: O2 and albuterol/atropine nebulizer, therapies: PT and OT and electrolyte monitoring and stabilization    Significant Diagnostic Studies:   Recent Labs:  CBC:   Lab Results   Component Value Date    WBC 10.3 11/30/2021    RBC 3.94 11/30/2021    HGB 11.0 11/30/2021    HCT 35.5 11/30/2021    MCV 90.1 11/30/2021    MCH 27.9 11/30/2021    MCHC 31.0 11/30/2021    RDW 13.4 11/30/2021     11/30/2021     BMP:    Lab Results   Component Value Date    GLUCOSE 191 11/30/2021     11/30/2021    K 4.0 11/30/2021    K 4.3 11/23/2021    CL 98 11/30/2021    CO2 29 11/30/2021    ANIONGAP 12 11/30/2021    BUN 46 11/30/2021    CREATININE 2.3 11/30/2021    CALCIUM 8.7 11/30/2021    LABGLOM 27 11/30/2021    GFRAA 33 11/30/2021     HFP:    Lab Results   Component Value Date    PROT 5.3 11/30/2021     CMP:    Lab Results   Component Value Date    GLUCOSE 191 11/30/2021     11/30/2021    K 4.0 11/30/2021    K 4.3 11/23/2021    CL 98 11/30/2021    CO2 29 11/30/2021    BUN 46 11/30/2021    CREATININE 2.3 11/30/2021    ANIONGAP 12 11/30/2021    ALKPHOS 72 11/30/2021    ALT 13 11/30/2021    AST 11 11/30/2021    BILITOT 0.7 11/30/2021    LABALBU 3.4 11/30/2021    LABGLOM 27 11/30/2021    GFRAA 33 11/30/2021    PROT 5.3 11/30/2021    CALCIUM 8.7 11/30/2021     PT/INR:    Lab Results   Component Value Date    PROTIME 15.0 11/23/2021    INR 1.16 11/23/2021     PTT:   Lab Results   Component Value Date    APTT 31.0 11/23/2021     FLP:    Lab Results   Component Value Date    CHOL 145 10/01/2018    TRIG 176 10/01/2018    HDL 38 10/01/2018     U/A:    Lab Results   Component Value Date    COLORU YELLOW 11/23/2021    SPECGRAV 1.017 11/23/2021    PHUR 5.0 11/23/2021    PROTEINU 100 11/23/2021    GLUCOSEU Negative 11/23/2021    KETUA Negative 11/23/2021    BILIRUBINUR Negative 11/23/2021    BILIRUBINUR NEG 11/16/2021    UROBILINOGEN 1.0 11/23/2021    NITRU Negative 11/23/2021    LEUKOCYTESUR Negative 11/23/2021     TSH:    Lab Results   Component Value Date    TSH 3.240 03/15/2021       Radiology Last 7 Days:  XR ABDOMEN (KUB) (SINGLE AP VIEW)    Result Date: 11/29/2021  1. Significant amount of stool in the colon and the rectum. Signed by Dr Frankie Lu    XR CHEST PORTABLE    Result Date: 11/23/2021  Mild cardiomegaly and changes of parahilar interstitial pulmonary edema unchanged in one hour. 2. Endotracheal tube satisfactorily positioned Signed by Dr Ramona Omalley    XR CHEST PORTABLE    Result Date: 11/23/2021  1. Findings most suggestive of pulmonary edema. Signed by Dr Claire Montes reviewed. Constitutional:       Appearance: He is obese. He is not ill-appearing or toxic-appearing. HENT:      Head: Normocephalic and atraumatic. Nose: Nose normal.   Eyes:      Conjunctiva/sclera: Conjunctivae normal.   Cardiovascular:      Rate and Rhythm: Normal rate. Rhythm irregular. Pulses: Normal pulses. Pulmonary:      Comments: Nasal cannula oxygen in place, improved auscultation of breath sounds bilaterally. Abdominal:      General: Bowel sounds are normal. There is distension. Tenderness: There is no guarding or rebound. Genitourinary:     Comments: Catheter in place  Musculoskeletal:      Cervical back: Normal range of motion. Comments: 1+ edema bilateral lower extremities   Skin:     Findings: Bruising present. Comments: Chronic venous stasis changes of the legs bilaterally   Neurological:      Mental Status: He is alert and oriented to person, place, and time. Mental status is at baseline.       Motor: Weakness present.    Psychiatric:         Mood and Affect: Mood normal.        Discharge Plan   Disposition: Home    Provider Follow-Up:   Sedrick Fraire MD  [address] [facility]  [phone]    On 12/8/2021  2[facility]        Patient Instructions   Diet: diabetic diet    Activity: activity as tolerated      Discharge Medications         Medication List      START taking these medications    polyethylene glycol 17 g packet  Commonly known as: GLYCOLAX  Take 17 g by mouth daily  Start taking on: December 1, 2021        CONTINUE taking these medications    amiodarone 200 MG tablet  Commonly known as: CORDARONE  TAKE 1 TABLET BY MOUTH DAILY     aspirin 81 MG EC tablet  Take 1 tablet by mouth daily     atorvastatin 20 MG tablet  Commonly known as: LIPITOR  Take 1 tablet by mouth daily     bumetanide 1 MG tablet  Commonly known as: BUMEX     Dexilant 60 MG Cpdr delayed release capsule  Generic drug: dexlansoprazole     ferrous sulfate 325 (65 Fe) MG tablet  Commonly known as: IRON 325  Take 1 tablet by mouth 2 times daily (with meals)     folic acid 1 MG tablet  Commonly known as: FOLVITE  Take 1 tablet by mouth daily     glipiZIDE 10 MG extended release tablet  Commonly known as: GLUCOTROL XL     metoclopramide 5 MG tablet  Commonly known as: Reglan  Take 1 tablet by mouth 3 times daily for 14 days     metoprolol succinate 100 MG extended release tablet  Commonly known as: TOPROL XL  Take 1 tablet by mouth daily     mirabegron 25 MG Tb24  Commonly known as: MYRBETRIQ  Take 1 tablet by mouth daily     NIFEdipine 60 MG extended release tablet  Commonly known as: ADALAT CC  Take 1 tablet by mouth daily     OXYGEN  Inhale 2 L into the lungs continuous     pantoprazole 20 MG tablet  Commonly known as: PROTONIX     Senna Plus 8.6-50 MG per tablet  Generic drug: senna-docusate     tamsulosin 0.4 MG capsule  Commonly known as: FLOMAX  Take 1 capsule by mouth 2 times daily     trospium 20 MG tablet  Commonly known as: SANCTURA  Take 1 tablet by mouth daily     vitamin D 1.25 MG (45743 UT) Caps capsule  Commonly known as: ERGOCALCIFEROL  Take 1 capsule by mouth once a week     VITAMIN D BOOSTER PO           Where to Get Your Medications      These medications were sent to [place] [id number] #[id number] [facility], [address] [facility]  [address] [facility], Critical access [facility] [place]    Phone: [phone]   · polyethylene glycol 17 g packet         EMR Dragon/Transcription disclaimer:   Much of this encounter note is an electronic transcription/translation of spoken language to printed text.  The electronic translation of spoken language may permit erroneous, or at times, nonsensical words or phrases to be inadvertently transcribed; although attempts have made to review the note for such errors, some may still exist.    Electronically signed by   Desean Sutton MD   Internal Medicine Hospitalist  On 11/30/2021  At 10:58 AM

## 2021-12-01 ENCOUNTER — TELEPHONE (OUTPATIENT)
Dept: GASTROENTEROLOGY | Age: 81
End: 2021-12-01

## 2021-12-01 NOTE — TELEPHONE ENCOUNTER
Last Colonoscopy - HP, internal hemorrhoids, diverticular disease, prn recall  Family hx colon cancer - Father     Hx CAD   Pacemaker in place      Assessment:      1. Gastroesophageal reflux disease, unspecified whether esophagitis present    2. Constipation, unspecified constipation type    3. Uncomfortable fullness after meals    4. Belching                        Plan:   - KUB today, r/o obstruction  - Schedule EGD  - Obtain cardiac clearance from Dr. Porfirio Manuel       Last visit [id number] ap[facility] 11-8-21. FU scheduled 12-16-21. Egd was CA because the patient was in the Hospital and just DC yesterday. Patient called leaving me a VM that he was having issues with internal hemorrhoids and asked for a return call. I tried to call the patient back but he did not answer, I left a VM @ 1:58 pm telling the patient I need a little more information, Was he asking for Rx or are they bad enough for a surgical referral?? I asked the patient to return my call.  Jorge L mcmahon

## 2021-12-02 RX ORDER — PLECANATIDE 3 MG/1
3 TABLET ORAL DAILY
Qty: 30 TABLET | Refills: 3 | Status: SHIPPED | OUTPATIENT
Start: 2021-12-02 | End: 2022-03-11

## 2021-12-02 NOTE — TELEPHONE ENCOUNTER
Trial of Trulance recommended, sent to pharmacy. [facility] for samples. Ok for enema if needed.

## 2021-12-02 NOTE — TELEPHONE ENCOUNTER
12-2-21    Patient called back today with additional information. Patient said he has constipation and is currently on Miralax and Senokot and neither one is working and his rectal area is very very sore and he is miserable and wants Rx to treat the internal hemorrhoids and the constipation, patient said at present he is not having any RB. Patient said he really does not want to go return to ED due to the cost and said he cannot afford this again with just having been in the Hospital about 2 weeks ago for his lungs. Patient said he is just miserable and not sure how much longer he can stand the pain and discomfort and pressure he is currently having in his rectal area and wants Rx to help with all the above. Patient uses [facility] on [address] side of [place]. I will forward this to Ren Wayne and OSVALDO mcmahon.   salome

## 2021-12-03 ENCOUNTER — OFFICE VISIT (OUTPATIENT)
Dept: PALLATIVE CARE | Age: 81
End: 2021-12-03
Payer: MEDICARE

## 2021-12-03 DIAGNOSIS — Z51.5 ENCOUNTER FOR PALLIATIVE CARE: ICD-10-CM

## 2021-12-03 DIAGNOSIS — J44.9 CHRONIC OBSTRUCTIVE PULMONARY DISEASE, UNSPECIFIED COPD TYPE (HCC): ICD-10-CM

## 2021-12-03 DIAGNOSIS — J96.12 CHRONIC RESPIRATORY FAILURE WITH HYPOXIA AND HYPERCAPNIA (HCC): ICD-10-CM

## 2021-12-03 DIAGNOSIS — K59.09 OTHER CONSTIPATION: ICD-10-CM

## 2021-12-03 DIAGNOSIS — K62.89 RECTAL PAIN: Primary | ICD-10-CM

## 2021-12-03 DIAGNOSIS — J96.11 CHRONIC RESPIRATORY FAILURE WITH HYPOXIA AND HYPERCAPNIA (HCC): ICD-10-CM

## 2021-12-03 DIAGNOSIS — R53.81 PHYSICAL DECONDITIONING: ICD-10-CM

## 2021-12-03 DIAGNOSIS — I50.32 CHRONIC DIASTOLIC CONGESTIVE HEART FAILURE (HCC): ICD-10-CM

## 2021-12-03 PROCEDURE — 1036F TOBACCO NON-USER: CPT | Performed by: NURSE PRACTITIONER

## 2021-12-03 PROCEDURE — G8417 CALC BMI ABV UP PARAM F/U: HCPCS | Performed by: NURSE PRACTITIONER

## 2021-12-03 PROCEDURE — G8482 FLU IMMUNIZE ORDER/ADMIN: HCPCS | Performed by: NURSE PRACTITIONER

## 2021-12-03 PROCEDURE — 4040F PNEUMOC VAC/ADMIN/RCVD: CPT | Performed by: NURSE PRACTITIONER

## 2021-12-03 PROCEDURE — 99348 HOME/RES VST EST LOW MDM 30: CPT | Performed by: NURSE PRACTITIONER

## 2021-12-03 PROCEDURE — 1123F ACP DISCUSS/DSCN MKR DOCD: CPT | Performed by: NURSE PRACTITIONER

## 2021-12-03 RX ORDER — HYDROCORTISONE ACETATE 25 MG/1
25 SUPPOSITORY RECTAL 2 TIMES DAILY PRN
Qty: 6 SUPPOSITORY | Refills: 0 | Status: SHIPPED | OUTPATIENT
Start: 2021-12-03 | End: 2021-12-22 | Stop reason: ALTCHOICE

## 2021-12-03 NOTE — TELEPHONE ENCOUNTER
12-03-21 Called spoke with the patient notified of medication and recommendation as per [facility] APRN     Patient stated that he is unable to stop by the office to  samples but to send in the prescription and he will pick it up at the pharmacy.  Cv

## 2021-12-03 NOTE — PROGRESS NOTES
Supportive Care/Community Based Palliative Care  Follow Up Note        Patient Name:  Ashlie Husain Record Number:  [id number]  YOB: 1940    Date of Visit: 12/3/2021  Location of Visit:  Home    Patient Care Team:  Dr. Julio Cesar Wilson - PCP  Dr. Mir Lundy - pulmonology  Dr. Bernard Ni - cardiology  Dr. Amada Taylor - urology  Dr. Ariel Laird - nephrology  [facility]    History obtained from:  patient, spouse, electronic medical record    [address] [facility] ORDERS/RECOMMENDATIONS/PLAN:   [address] was seen today for follow-up. Diagnoses and all orders for this visit:    Rectal pain    Other constipation    Physical deconditioning    Chronic diastolic congestive heart failure (HCC)    Chronic respiratory failure with hypoxia and hypercapnia (HCC)    Chronic obstructive pulmonary disease, unspecified COPD type ([facility] [address]    Encounter for palliative care    Other orders  -    hydrocortisone (ANUSOL-HC) 25 MG suppository; Place 1 suppository rectally 2 times daily as needed for Hemorrhoids     Follow up with GI as scheduled. They have prescribed Trulance. Go to ED if worse over the weekend.    Current goals of care include: Maintain/Improve function quality of life, Remain at home, Would want hospitalization if needed  Will continue ACP discussions at future visit  Will continue goals of care discussions based on clinical course  Follow up home visit in as needed    CHIEF COMPLAINT:     Chief Complaint   Patient presents with    Follow-up     Palliative care     CLINICAL SUMMARY:          Mr. Talia Sewell is a [de-identified]year old male with PMH of COPD, CKD stage III, diabetes, HTN, GERD, hyperlipidemia, PVD,  CAD,  chronic back pain, osteoarthritis, diverticulitis, bladder cancer and more recently recurrent pneumonia, NSVT, sinus node dysfunction with prolonged pauses requiring pacemaker placement, chronic respiratory failure and now requiring Trilogy use at night.      He has ADVANCED CARE PLANNING:                                             Surrogate Decision Maker:  Primary Decision Maker: Sigrid Jones - [phone]     Durable Power of :no     Advanced Directives/Living Jung: yes  On file     Out of hospital medical orders in place to reflect resuscitation status: no    FUNCTIONAL ASSESSMENT:     Palliative Performance Scale:  60% Ambulation reduced; Significant disease; Can't do hobbies/housework; intake normal or reduced; occasional assist; LOC full/confusion    PSYCHOSOCIAL / SPIRITUAL SCREENING:     Any spiritual / Jewish concerns:  No spiritual distress identified     Caregiver Burnout: No    HISTORY:     Past medical history, surgical history, family history, social history unchanged    Current Medications:      Current Outpatient Medications:     Plecanatide (TRULANCE) 3 MG TABS, Take 3 mg by mouth daily, Disp: 30 tablet, Rfl: 3    polyethylene glycol (GLYCOLAX) 17 g packet, Take 17 g by mouth daily, Disp: 527 g, Rfl: 1    metoclopramide (REGLAN) 5 MG tablet, Take 1 tablet by mouth 3 times daily for 14 days, Disp: 42 tablet, Rfl: 0    pantoprazole (PROTONIX) 20 MG tablet, Take 20 mg by mouth daily, Disp: , Rfl:     Nutritional Supplements (VITAMIN D BOOSTER PO), Take by mouth, Disp: , Rfl:     SENNA PLUS 8.6-50 MG per tablet, , Disp: , Rfl:     glipiZIDE (GLUCOTROL XL) 10 MG extended release tablet, TAKE 1 TABLET BY MOUTH EVERY DAY, Disp: , Rfl:     dexlansoprazole (DEXILANT) 60 MG CPDR delayed release capsule, Take 60 mg by mouth daily , Disp: , Rfl:     bumetanide (BUMEX) 1 MG tablet, Take 1 mg by mouth daily, Disp: , Rfl:     amiodarone (CORDARONE) 200 MG tablet, TAKE 1 TABLET BY MOUTH DAILY, Disp: 30 tablet, Rfl: 3    mirabegron (MYRBETRIQ) 25 MG TB24, Take 1 tablet by mouth daily, Disp: 90 tablet, Rfl: 3    trospium (SANCTURA) 20 MG tablet, Take 1 tablet by mouth daily, Disp: 90 tablet, Rfl: 3    aspirin 81 MG EC tablet, Take 1 tablet by mouth daily, Disp: 30 tablet, Rfl: 3    atorvastatin (LIPITOR) 20 MG tablet, Take 1 tablet by mouth daily, Disp: 30 tablet, Rfl: 0    metoprolol succinate (TOPROL XL) 100 MG extended release tablet, Take 1 tablet by mouth daily, Disp: 30 tablet, Rfl: 3    NIFEdipine (ADALAT CC) 60 MG extended release tablet, Take 1 tablet by mouth daily, Disp: 30 tablet, Rfl: 3    ferrous sulfate (IRON 325) 325 (65 Fe) MG tablet, Take 1 tablet by mouth 2 times daily (with meals), Disp: 30 tablet, Rfl: 3    folic acid (FOLVITE) 1 MG tablet, Take 1 tablet by mouth daily, Disp: 30 tablet, Rfl: 3    tamsulosin (FLOMAX) 0.4 MG capsule, Take 1 capsule by mouth 2 times daily, Disp: 60 capsule, Rfl: 3    vitamin D (ERGOCALCIFEROL) 1.25 MG (97300 UT) CAPS capsule, Take 1 capsule by mouth once a week, Disp: 5 capsule, Rfl: 0    OXYGEN, Inhale 2 L into the lungs continuous, Disp: 1 Container, Rfl: 5     Allergies:  Eliquis [apixaban] and Promethazine hcl    Review of Systems  Review of Systems   Constitutional: Negative for chills and fever. HENT: Negative. Respiratory: Positive for shortness of breath. Cardiovascular: Positive for leg swelling. Negative for chest pain and palpitations. Gastrointestinal: Positive for constipation and rectal pain. Negative for diarrhea and vomiting. Some reflux/heartburn occasionally   Genitourinary: Positive for frequency (due to diuretic). Negative for difficulty urinating. Musculoskeletal: Positive for back pain (low back- chronic). Neurological: Positive for weakness and numbness (neuropathy feet). Psychiatric/Behavioral: Negative.       OBJECTIVE:     Vitals:    12/03/21 1510   BP: 138/60   Pulse: 80   Resp: 20   Temp: 98 °F (36.7 °C)   SpO2: 98%     General appearance: alert and cooperative with exam, vital signs stable, well nourished, well developed  HEENT: atraumatic, sclera clear,  pupils and irises normal, external ears and nose are normal, lips normal.  Neck:  supple  Lungs: equal bilateral expansion, lungs clear, diminished bilaterally  Heart:  regular rate and rhythm, S1 S2 normal  Abdomen:  soft, non-tender, bowel sounds active  Rectal exam: patient deferred  Extremities:  no cyanosis, clubbing, 1+ lower extremity and pedal edema  Neurologic: no focal neurologic deficits,alert and oriented   Psychiatric:  no recent or remote memory deficits, mood and affect appropriate  Skin: warm, dry     Established Patient Visit Time: 30 minutes, Greater than 50% of the time in this visit was spent counseling and coordinating care of this patient. This dictation was generated by voice recognition computer software. Although all attempts are made to edit the dictation for accuracy, there may be errors in the transcription that are not intended.       Electronically signed by ELISABETH Sweeney CNP on 12/3/2021 at 3:31 PM

## 2021-12-05 ENCOUNTER — APPOINTMENT (OUTPATIENT)
Dept: GENERAL RADIOLOGY | Age: 81
End: 2021-12-05
Payer: MEDICARE

## 2021-12-05 ENCOUNTER — HOSPITAL ENCOUNTER (EMERGENCY)
Age: 81
Discharge: HOME | End: 2021-12-06
Attending: EMERGENCY MEDICINE
Payer: MEDICARE

## 2021-12-05 DIAGNOSIS — K56.41 FECAL IMPACTION IN RECTUM (HCC): ICD-10-CM

## 2021-12-05 DIAGNOSIS — R33.9 URINARY RETENTION: Primary | ICD-10-CM

## 2021-12-05 DIAGNOSIS — K59.00 CONSTIPATION, UNSPECIFIED CONSTIPATION TYPE: ICD-10-CM

## 2021-12-05 LAB
ALBUMIN SERPL-MCNC: 3.4 G/DL (ref 3.5–5.2)
ALP BLD-CCNC: 71 U/L (ref 40–130)
ALT SERPL-CCNC: 15 U/L (ref 5–41)
ANION GAP SERPL CALCULATED.3IONS-SCNC: 10 MMOL/L (ref 7–19)
AST SERPL-CCNC: 13 U/L (ref 5–40)
BASOPHILS ABSOLUTE: 0 K/UL (ref 0–0.2)
BASOPHILS RELATIVE PERCENT: 0.3 % (ref 0–1)
BILIRUB SERPL-MCNC: 0.4 MG/DL (ref 0.2–1.2)
BILIRUBIN URINE: NEGATIVE
BLOOD, URINE: NEGATIVE
BUN BLDV-MCNC: 41 MG/DL (ref 8–23)
CALCIUM SERPL-MCNC: 8.4 MG/DL (ref 8.8–10.2)
CHLORIDE BLD-SCNC: 100 MMOL/L (ref 98–111)
CLARITY: CLEAR
CO2: 31 MMOL/L (ref 22–29)
COLOR: YELLOW
CREAT SERPL-MCNC: 2.8 MG/DL (ref 0.5–1.2)
EOSINOPHILS ABSOLUTE: 0 K/UL (ref 0–0.6)
EOSINOPHILS RELATIVE PERCENT: 0 % (ref 0–5)
GFR AFRICAN AMERICAN: 26
GFR NON-AFRICAN AMERICAN: 22
GLUCOSE BLD-MCNC: 86 MG/DL (ref 74–109)
GLUCOSE URINE: NEGATIVE MG/DL
HCT VFR BLD CALC: 39.1 % (ref 42–52)
HEMOGLOBIN: 11.7 G/DL (ref 14–18)
IMMATURE GRANULOCYTES #: 0.4 K/UL
INR BLD: 1 (ref 0.88–1.18)
KETONES, URINE: NEGATIVE MG/DL
LEUKOCYTE ESTERASE, URINE: NEGATIVE
LYMPHOCYTES ABSOLUTE: 1.4 K/UL (ref 1.1–4.5)
LYMPHOCYTES RELATIVE PERCENT: 11.9 % (ref 20–40)
MCH RBC QN AUTO: 27.9 PG (ref 27–31)
MCHC RBC AUTO-ENTMCNC: 29.9 G/DL (ref 33–37)
MCV RBC AUTO: 93.1 FL (ref 80–94)
MONOCYTES ABSOLUTE: 1 K/UL (ref 0–0.9)
MONOCYTES RELATIVE PERCENT: 8.5 % (ref 0–10)
NEUTROPHILS ABSOLUTE: 8.9 K/UL (ref 1.5–7.5)
NEUTROPHILS RELATIVE PERCENT: 75.8 % (ref 50–65)
NITRITE, URINE: NEGATIVE
PDW BLD-RTO: 13.7 % (ref 11.5–14.5)
PH UA: 6.5 (ref 5–8)
PLATELET # BLD: 141 K/UL (ref 130–400)
PMV BLD AUTO: 11.5 FL (ref 9.4–12.4)
POTASSIUM SERPL-SCNC: 3.3 MMOL/L (ref 3.5–5)
PROTEIN UA: NEGATIVE MG/DL
PROTHROMBIN TIME: 13.4 SEC (ref 12–14.6)
RBC # BLD: 4.2 M/UL (ref 4.7–6.1)
SODIUM BLD-SCNC: 141 MMOL/L (ref 136–145)
SPECIFIC GRAVITY UA: 1.01 (ref 1–1.03)
TOTAL PROTEIN: 5.7 G/DL (ref 6.6–8.7)
UROBILINOGEN, URINE: 1 E.U./DL
WBC # BLD: 11.8 K/UL (ref 4.8–10.8)

## 2021-12-05 PROCEDURE — 6370000000 HC RX 637 (ALT 250 FOR IP): Performed by: EMERGENCY MEDICINE

## 2021-12-05 PROCEDURE — 36415 COLL VENOUS BLD VENIPUNCTURE: CPT

## 2021-12-05 PROCEDURE — 85610 PROTHROMBIN TIME: CPT

## 2021-12-05 PROCEDURE — 81003 URINALYSIS AUTO W/O SCOPE: CPT

## 2021-12-05 PROCEDURE — 51702 INSERT TEMP BLADDER CATH: CPT

## 2021-12-05 PROCEDURE — 85025 COMPLETE CBC W/AUTO DIFF WBC: CPT

## 2021-12-05 PROCEDURE — 74022 RADEX COMPL AQT ABD SERIES: CPT

## 2021-12-05 PROCEDURE — 99285 EMERGENCY DEPT VISIT HI MDM: CPT

## 2021-12-05 PROCEDURE — 80053 COMPREHEN METABOLIC PANEL: CPT

## 2021-12-05 RX ORDER — SODIUM PHOSPHATE, DIBASIC AND SODIUM PHOSPHATE, MONOBASIC 7; 19 G/133ML; G/133ML
1 ENEMA RECTAL
Status: COMPLETED | OUTPATIENT
Start: 2021-12-05 | End: 2021-12-05

## 2021-12-05 RX ORDER — TAMSULOSIN HYDROCHLORIDE 0.4 MG/1
0.4 CAPSULE ORAL DAILY
Qty: 30 CAPSULE | Refills: 0 | Status: SHIPPED | OUTPATIENT
Start: 2021-12-05 | End: 2021-12-13

## 2021-12-05 RX ADMIN — SODIUM PHOSPHATE, DIBASIC AND SODIUM PHOSPHATE, MONOBASIC 1 ENEMA: 7; 19 ENEMA RECTAL at 17:33

## 2021-12-05 ASSESSMENT — PAIN SCALES - GENERAL: PAINLEVEL_OUTOF10: 7

## 2021-12-05 NOTE — ED PROVIDER NOTES
[facility] EMERGENCY DEPT  eMERGENCY dEPARTMENT eNCOUnter      Pt Name: Louise Vasquez  MRN: [id number]  [place] 1940  Date of evaluation: 12/5/2021  Provider: Sidra Ramos MD    CHIEF COMPLAINT       Chief Complaint   Patient presents with    Urinary Retention     pt states that he has been having retention for 24 hrs    Constipation     patient states that he has been constipated for 24 days         HISTORY OF PRESENT ILLNESS   (Location/Symptom, Timing/Onset,Context/Setting, Quality, Duration, Modifying Factors, Severity)  Note limiting factors. Louise Vasquez is a [address] [facility] y.o. male who presents to the emergency department with urinary retention and constipation. Was recently in hospital states has been struggling with constipation. X days. Elan Dhillon got my breathing better focused on my CHF but no one ever fixed my constipation issues, now I can't urinate. \"    Pt has no fever. The history is provided by the patient and medical records. NursingNotes were reviewed. REVIEW OF SYSTEMS    (2-9 systems for level 4, 10 or more for level 5)     Review of Systems   Constitutional: Negative for fever. Respiratory: Negative for cough. Cardiovascular: Negative for chest pain. Gastrointestinal: Positive for constipation. Negative for abdominal pain, blood in stool and vomiting. Neurological: Negative for syncope. A complete review of systems was performed and is negative except as noted above in the HPI.        PAST MEDICAL HISTORY     Past Medical History:   Diagnosis Date    Acute liver failure without hepatic coma 10/23/2018    Back pain     \"with tired legs as a result\"    Bladder cancer ([address] 12/19/2018    Blood circulation, collateral     Carotid arterial disease ([address]     recent surgery    CKD (chronic kidney disease), 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/2018    COPD with acute lower respiratory infection ([address] 02/24/2021    Epigastric discomfort     GERD (gastroesophageal reflux disease)  Hyperlipidemia     Hypertension     Hypertension     Palliative care patient 10/23/2018    Pneumonia due to infectious organism 11/06/2018    Primary osteoarthritis of left knee 10/14/2018    PVD (peripheral vascular disease) (HCC)     Tremor     Tremor on Right side x 1-2 weeks per stepdaughter    Type 2 diabetes mellitus with complication, without long-term current use of insulin ([address] 01/21/2021         SURGICAL HISTORY       Past Surgical History:   Procedure Laterality Date    BACK SURGERY      CARDIAC CATHETERIZATION  03/23/2021    100% RCA    COLONOSCOPY  2007?  CYSTOSCOPY Bilateral 12/19/2018    CYSTOSCOPY, BIOSPY FULGURATION OF BLADDER TUMOR POSSIBLE TURBT, RETROGRADE PYELOGRAM performed by Shailesh Davis MD at [address] COLONOSCOPY FLX DX W/COLLJ SPEC WHEN PFRMD N/A 09/11/2017    Dr Millan Knee internal hemorrhoids, diverticular disease-HP-No recall (age)   [facility] [place] REVISE MEDIAN N/CARPAL TUNNEL SURG Left 07/18/2018    OPEN CARPAL TUNNEL RELEASE performed by Patricia Infante MD at [address] Left 08/28/2018    LEFT CAROTID ENDARTERECTOMY WITH VEIN PATCH ANGIOPLASTY AND COMPLETION ANGIOGRAM performed by Alexis Madden MD at [place] [address] Left 10/15/2018    LEFT COMPLEX TOTAL KNEE ARTHROPLASTY performed by Patricia Infante MD at [address] [facility] ENDOSCOPY N/A 11/18/2021    Dr Villareal Pale, Sub prep lg amount of solid and semisolid food/Medication in stomach body/antrum/pylorus, stomach lumen appeared to distended w air insufflation and collapse upon suction,   inadequate exam sugg of gastroparesis, repeat in 10-14 days    VASCULAR SURGERY  04/21/2015    Jamshid BESS Ultrasound guided access of left common femoral artery. Aortogram.Diagnostic right lower extremity arteriogram.Radiologic supervision and interpretation.     VASCULAR SURGERY  01/13/2015    CHARLY Emir Noel M.D Atherectomy,angioplasty,and stenting of left superficial femoral artery.  VASCULAR SURGERY  03/11/2014    Yonis Ingram M.D. Ultrasound-guided access of right common femoral artery. Aortogram.Left lower extremity arteriogram.Atherectomy and angioplasty of left superficial femoral artery. Radiologic supervision and interpretation.  VASCULAR SURGERY  01/18/2013    Yonis BESS Aortogram.Multistation arteriogram right lower extremity. Laser atherectomy and angioplasty of right superficial femoral artery. Selective catheterization of right tibioperoneal trunk. Angioplasty of peroneal artery and tibioperoneal trunk.  VASCULAR SURGERY  10/30/2018    [facility]. Ultrasound guided cannulation of right internal vein. Placement of right internal jugular vein tunneled dialysis catheter bard equistream xk 23cm tip to cuff    VASCULAR SURGERY  12/17/2018    SJS. Removal of tunneled dilaysis catheter right internal jugular vein.          CURRENT MEDICATIONS       Discharge Medication List as of 12/6/2021  9:07 AM      CONTINUE these medications which have NOT CHANGED    Details   hydrocortisone (ANUSOL-HC) 25 MG suppository Place 1 suppository rectally 2 times daily as needed for Hemorrhoids, Disp-6 suppository, R-0Normal      Plecanatide (TRULANCE) 3 MG TABS Take 3 mg by mouth daily, Disp-30 tablet, R-3Normal      polyethylene glycol (GLYCOLAX) 17 g packet Take 17 g by mouth daily, Disp-527 g, R-1Normal      metoclopramide (REGLAN) 5 MG tablet Take 1 tablet by mouth 3 times daily for 14 days, Disp-42 tablet, R-0Normal      pantoprazole (PROTONIX) 20 MG tablet Take 20 mg by mouth dailyHistorical Med      Nutritional Supplements (VITAMIN D BOOSTER PO) Take by mouthHistorical Med      SENNA PLUS 8.6-50 MG per tablet DAWHistorical Med      glipiZIDE (GLUCOTROL XL) 10 MG extended release tablet TAKE 1 TABLET BY MOUTH EVERY DAYHistorical Med      dexlansoprazole (DEXILANT) 60 MG CPDR delayed release capsule Take 60 mg by mouth daily Historical Med      bumetanide (BUMEX) 1 MG tablet Take 1 mg by mouth dailyHistorical Med      amiodarone (CORDARONE) 200 MG tablet TAKE 1 TABLET BY MOUTH DAILY, Disp-30 tablet, R-3Normal      mirabegron (MYRBETRIQ) 25 MG TB24 Take 1 tablet by mouth daily, Disp-90 tablet, R-3Normal      trospium (SANCTURA) 20 MG tablet Take 1 tablet by mouth daily, Disp-90 tablet, R-3Normal      aspirin 81 MG EC tablet Take 1 tablet by mouth daily, Disp-30 tablet, R-3Normal      atorvastatin (LIPITOR) 20 MG tablet Take 1 tablet by mouth daily, Disp-30 tablet, R-0Normal      metoprolol succinate (TOPROL XL) 100 MG extended release tablet Take 1 tablet by mouth daily, Disp-30 tablet, R-3Normal      NIFEdipine (ADALAT CC) 60 MG extended release tablet Take 1 tablet by mouth daily, Disp-30 tablet, R-3Normal      ferrous sulfate (IRON 325) 325 (65 Fe) MG tablet Take 1 tablet by mouth 2 times daily (with meals), Disp-30 tablet, [id number]      folic acid (FOLVITE) 1 MG tablet Take 1 tablet by mouth daily, Disp-30 tablet, R-3Normal      vitamin D (ERGOCALCIFEROL) 1.25 MG (03999 UT) CAPS capsule Take 1 capsule by mouth once a week, Disp-5 capsule, R-0Normal      OXYGEN Inhale 2 L into the lungs continuous, Disp-1 Container, R-5Normal             ALLERGIES     Eliquis [apixaban] and Promethazine hcl    FAMILY HISTORY       Family History   Problem Relation Age of Onset    Colon Cancer Father     Diabetes Brother     Colon Polyps Neg Hx     Liver Cancer Neg Hx     Liver Disease Neg Hx     Esophageal Cancer Neg Hx     Rectal Cancer Neg Hx     Stomach Cancer Neg Hx           SOCIAL HISTORY       Social History     Socioeconomic History    Marital status:      Spouse name: None    Number of children: None    Years of education: None    Highest education level: None   Occupational History    None   Tobacco Use    Smoking status: Former Smoker     Quit date: 6/3/2003     Years since quittin.5    Smokeless tobacco: Never Used   Vaping Use    Vaping Use: Never used   Substance and Sexual Activity    Alcohol use: Yes     Alcohol/week: 12.0 standard drinks     Types: 12 Glasses of wine per week     Comment: 2 glasses of wine every night    Drug use: No    Sexual activity: Yes     Partners: Female   Other Topics Concern    None   Social History Narrative    None     Social Determinants of Health     Financial Resource Strain:     Difficulty of Paying Living Expenses: Not on file   Food Insecurity:     Worried About Running Out of Food in the Last Year: Not on file    Ankit of Food in the Last Year: Not on file   Transportation Needs:     Lack of Transportation (Medical): Not on file    Lack of Transportation (Non-Medical):  Not on file   Physical Activity:     Days of Exercise per Week: Not on file    Minutes of Exercise per Session: Not on file   Stress:     Feeling of Stress : Not on file   Social Connections:     Frequency of Communication with Friends and Family: Not on file    Frequency of Social Gatherings with Friends and Family: Not on file    Attends Cheondoism Services: Not on file    Active Member of [address] [place] [address] or Organizations: Not on file    Attends Club or Organization Meetings: Not on file    Marital Status: Not on file   Intimate Partner Violence:     Fear of Current or Ex-Partner: Not on file    Emotionally Abused: Not on file    Physically Abused: Not on file    Sexually Abused: Not on file   Housing Stability:     Unable to Pay for Housing in the Last Year: Not on file    Number of [place] in the Last Year: Not on file    Unstable Housing in the Last Year: Not on file       SCREENINGS    [place] Coma Scale  Eye Opening: Spontaneous  Best Verbal Response: Oriented  Best Motor Response: Obeys commands  [place] Coma Scale Score: 15        PHYSICAL EXAM    (up to 7 for level 4, 8 or more for level 5)     ED Triage Vitals [12/05/21 1514]   BP Temp Temp Source Pulse Resp SpO2 Height Performed by ED Physician - none    LABS:  Labs Reviewed   CBC WITH AUTO DIFFERENTIAL - Abnormal; Notable for the following components:       Result Value    WBC 11.8 (*)     RBC 4.20 (*)     Hemoglobin 11.7 (*)     Hematocrit 39.1 (*)     MCHC 29.9 (*)     Neutrophils % 75.8 (*)     Lymphocytes % 11.9 (*)     Neutrophils Absolute 8.9 (*)     Monocytes Absolute 1.00 (*)     All other components within normal limits   COMPREHENSIVE METABOLIC PANEL - Abnormal; Notable for the following components:    Potassium 3.3 (*)     CO2 31 (*)     BUN 41 (*)     CREATININE 2.8 (*)     GFR Non- 22 (*)     GFR  26 (*)     Calcium 8.4 (*)     Total Protein 5.7 (*)     Albumin 3.4 (*)     All other components within normal limits   PROTIME-INR   URINE RT REFLEX TO CULTURE       All other labs were within normal range or not returned as of this dictation.     EMERGENCY DEPARTMENT COURSE and DIFFERENTIALDIAGNOSIS/MDM:   Vitals:    Vitals:    12/06/21 0200 12/06/21 0300 12/06/21 0400 12/06/21 0600   BP: (!) 106/51 (!) 113/55 (!) 110/54 (!) 123/55   Pulse:       Resp:       Temp:       TempSrc:       SpO2: 91% 94% 95% 95%       MDM  Number of Diagnoses or Management Options  Constipation, unspecified constipation type  Fecal impaction in rectum [facility])  Urinary retention  Diagnosis management comments: Urinary retention likely due to stool fecal impaction    Pt had out 1300 cc urine with carter placement       I also cleaned him out manually disimpacted him got large amount stool out    Carter working well    Pt to board in ER till family can come get him    Otherwise improved    Follow up pcp and uro    outpt     Continue bowel regimen at home to continue to move bowels        Amount and/or Complexity of Data Reviewed  Clinical lab tests: ordered and reviewed  Tests in the radiology section of CPT®: ordered and reviewed  Decide to obtain previous medical records or to obtain history from someone other than the patient: yes    Patient Progress  Patient progress: improved        CONSULTS:  None    PROCEDURES:  Unless otherwise notedbelow, none     Procedures    FINAL IMPRESSION     1. Urinary retention    2. Constipation, unspecified constipation type    3.  Fecal impaction in rectum [facility])          DISPOSITION/PLAN   DISPOSITION Decision To Discharge 12/06/2021 09:43:25 AM      PATIENT REFERRED TO:  Nikolas Montgomery MD  [address] [facility]  [phone]    Schedule an appointment as soon as possible for a visit in 1 week      [place] [address] [address] [facility] [address]  [phone]    Schedule an appointment as soon as possible for a visit   4 days to see if carter can be removed      DISCHARGE MEDICATIONS:  Discharge Medication List as of 12/6/2021  9:07 AM             (Please note that portions of this note were completed with a voice recognition program.  Efforts were made to edit the dictations butoccasionally words are mis-transcribed.)    Evelyn Berg MD (electronically signed)  AttendingEmergency Physician         Evelyn Berg MD  12/09/21 [address] [place] [address] MD Marina  12/13/21 1291

## 2021-12-06 VITALS
SYSTOLIC BLOOD PRESSURE: 120 MMHG | HEART RATE: 60 BPM | TEMPERATURE: 97.3 F | DIASTOLIC BLOOD PRESSURE: 56 MMHG | OXYGEN SATURATION: 92 % | RESPIRATION RATE: 18 BRPM

## 2021-12-06 VITALS
RESPIRATION RATE: 16 BRPM | SYSTOLIC BLOOD PRESSURE: 123 MMHG | DIASTOLIC BLOOD PRESSURE: 55 MMHG | HEART RATE: 61 BPM | OXYGEN SATURATION: 95 % | TEMPERATURE: 98.2 F

## 2021-12-06 NOTE — ED NOTES
RN chaperoned with MD to disimpact rectum.  Patient tolerated well       Lan Morris RN  12/05/21 [id number]

## 2021-12-06 NOTE — ED NOTES
Attempted to contact pts family Leidy Salcido, received voicemail.  Provided call back number       [facility], ROSA  12/05/21 4749

## 2021-12-06 NOTE — ED NOTES
Pt communicated with wife, family will arrive to pick him up around 8-8:30 - per pt       ROSA Leiva  12/06/21 [id number]

## 2021-12-09 ENCOUNTER — TELEPHONE (OUTPATIENT)
Dept: UROLOGY | Age: 81
End: 2021-12-09

## 2021-12-09 ASSESSMENT — ENCOUNTER SYMPTOMS
ABDOMINAL PAIN: 0
VOMITING: 0
BLOOD IN STOOL: 0
COUGH: 0
CONSTIPATION: 1

## 2021-12-09 NOTE — TELEPHONE ENCOUNTER
Ashwin requests a call back please. He advised that he was seen in the ED @ [facility] on Sunday past and a catheter was placed and it is causing him a lot of pain, patient advised that he can not wait until Monday to have same removed. Thank you.

## 2021-12-09 NOTE — TELEPHONE ENCOUNTER
Patient call our [facility] team about wanting his cath taken out today, I told them we had no provider in office this afternoon, and we could take the cath out on Monday when he has his cysto appt.

## 2021-12-13 ENCOUNTER — PROCEDURE VISIT (OUTPATIENT)
Dept: UROLOGY | Age: 81
End: 2021-12-13
Payer: MEDICARE

## 2021-12-13 VITALS — HEIGHT: 72 IN | WEIGHT: 209 LBS | BODY MASS INDEX: 28.31 KG/M2

## 2021-12-13 DIAGNOSIS — R33.8 BENIGN PROSTATIC HYPERPLASIA WITH URINARY RETENTION: Primary | ICD-10-CM

## 2021-12-13 DIAGNOSIS — N40.1 BENIGN PROSTATIC HYPERPLASIA WITH URINARY RETENTION: Primary | ICD-10-CM

## 2021-12-13 DIAGNOSIS — Z85.51 HISTORY OF BLADDER CANCER: ICD-10-CM

## 2021-12-13 PROCEDURE — 51798 US URINE CAPACITY MEASURE: CPT | Performed by: UROLOGY

## 2021-12-13 PROCEDURE — 52000 CYSTOURETHROSCOPY: CPT | Performed by: UROLOGY

## 2021-12-13 RX ORDER — TAMSULOSIN HYDROCHLORIDE 0.4 MG/1
0.8 CAPSULE ORAL DAILY
Qty: 60 CAPSULE | Refills: 11 | Status: SHIPPED | OUTPATIENT
Start: 2021-12-13

## 2021-12-13 NOTE — PROGRESS NOTES
BLADDER CANCER  Patient was first diagnosed with bladder cancer approximately 3 years(s) ago. 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 None  Last upper tract study: 18 month(s) ago. Study was a CT done 7/31/2020. Patient is here for surveillance for bladder cancer. He had seen my nurse practitioner on August 16, 2021 for some lower urinary tract symptoms secondary to BPH. He has longstanding history of BPH with moderate symptoms including decreased force of stream intermittency but really was complaining of frequency urgency and nocturia. He had been on tamsulosin 0.8 mg and Myrbetriq 25 mg. Trospium was added because of worsening frequency and urgency. At follow-up on 11/16/2021 with ELISABETH Cartagena patient is symptoms were significantly improved on the combination of Myrbetriq's trospium medical regimen. However. However he developed severe constipation and subsequently urinary retention and was seen in the ER recently on 12/5/2021. He required manual disimpaction and catheter was placed and had 1300 mL residual.  He comes in today with Shultz catheter in place for voiding trial at the time of cystoscopy for his bladder cancer surveillance. Cystoscopy Operative Note (12/13/21)  Surgeon: Claudene Dance, MD  Anesthesia: Urethral 2% Xylocaine   Indications: History bladder cancer, BPH with urinary retention  Position: Supine    Findings:   The patient was prepped and draped in the usual sterile fashion. The flexible cystoscope was advanced through the urethra and into the bladder under direct vision.   The bladder was thoroughly inspected and the following was noted:    Residual Urine: After cystoscopy I left his bladder full bladder scan residual check his voiding. No orders of the defined types were placed in this encounter. Return in about 1 week (around 12/20/2021) for Follow-up with Bony BARBER at  next visit.         Emely Crow MD

## 2021-12-16 ENCOUNTER — TELEPHONE (OUTPATIENT)
Dept: UROLOGY | Age: 81
End: 2021-12-16

## 2021-12-16 ENCOUNTER — OFFICE VISIT (OUTPATIENT)
Dept: GASTROENTEROLOGY | Age: 81
End: 2021-12-16
Payer: MEDICARE

## 2021-12-16 VITALS
OXYGEN SATURATION: 100 % | WEIGHT: 209 LBS | BODY MASS INDEX: 28.31 KG/M2 | HEART RATE: 60 BPM | HEIGHT: 72 IN | SYSTOLIC BLOOD PRESSURE: 110 MMHG | DIASTOLIC BLOOD PRESSURE: 60 MMHG

## 2021-12-16 DIAGNOSIS — K21.9 CHRONIC GERD: ICD-10-CM

## 2021-12-16 DIAGNOSIS — K59.00 CONSTIPATION, UNSPECIFIED CONSTIPATION TYPE: ICD-10-CM

## 2021-12-16 DIAGNOSIS — K31.89 RETAINED FOOD IN STOMACH: Primary | ICD-10-CM

## 2021-12-16 PROCEDURE — 1036F TOBACCO NON-USER: CPT | Performed by: NURSE PRACTITIONER

## 2021-12-16 PROCEDURE — 4040F PNEUMOC VAC/ADMIN/RCVD: CPT | Performed by: NURSE PRACTITIONER

## 2021-12-16 PROCEDURE — G8482 FLU IMMUNIZE ORDER/ADMIN: HCPCS | Performed by: NURSE PRACTITIONER

## 2021-12-16 PROCEDURE — G8417 CALC BMI ABV UP PARAM F/U: HCPCS | Performed by: NURSE PRACTITIONER

## 2021-12-16 PROCEDURE — 99214 OFFICE O/P EST MOD 30 MIN: CPT | Performed by: NURSE PRACTITIONER

## 2021-12-16 PROCEDURE — G8427 DOCREV CUR MEDS BY ELIG CLIN: HCPCS | Performed by: NURSE PRACTITIONER

## 2021-12-16 PROCEDURE — 1123F ACP DISCUSS/DSCN MKR DOCD: CPT | Performed by: NURSE PRACTITIONER

## 2021-12-16 PROCEDURE — 1111F DSCHRG MED/CURRENT MED MERGE: CPT | Performed by: NURSE PRACTITIONER

## 2021-12-16 ASSESSMENT — ENCOUNTER SYMPTOMS
ABDOMINAL PAIN: 0
VOMITING: 0
CHOKING: 0
ABDOMINAL DISTENTION: 0
NAUSEA: 0
COUGH: 0
DIARRHEA: 0
TROUBLE SWALLOWING: 0
CONSTIPATION: 1
BLOOD IN STOOL: 0
SHORTNESS OF BREATH: 0
ANAL BLEEDING: 0
RECTAL PAIN: 0

## 2021-12-16 NOTE — TELEPHONE ENCOUNTER
Spoke with patient and let him know that per Dr. Abimael Luu last note that he was to be utilizing in and out catheterization until his follow up on 12/22/21. I did let him know that if he needed any more samples between now and then he was more than welcome to come to the office and we would get him taken care of. He voiced understanding.

## 2021-12-16 NOTE — PROGRESS NOTES
stayed on a clear liquid diet on the day before his EGD procedure. 5.  He will likely require a nuclear medicine gastric emptying scan study in the near future. - Resume previous meds   - GI clinic f/u 3 to 4 weeks with Ms. Robert Dee scheduled f/u appts with other MDs     All scope and pathology reports were reviewed and discussed with patient. All questions answered, pt verbalized understanding. Assessment:     1. Retained food in stomach    2. Chronic GERD    3. Constipation, unspecified constipation type            Plan:   - Schedule Gastric emptying study  - Begin Trulance daily   - Follow up in 2-3 months or sooner if needed  - Schedule EGD  Nothing to eat or drink after midnight. No driving for 24 hours after procedure. Bring a  to procedure. No aspirin, NSAIDs, fish oil 5 days before procedure. I have discussed the benefits, alternatives, and risks (including bleeding, perforation and death)  for pursuing Endoscopy (EGD/Colonscopy/EUS/ERCP) with the patient and they are willing to continue. We also discussed the need for anesthesia, IV access, proper dietary changes, medication changes if necessary, and need for bowel prep (if ordered) prior to their Endoscopic procedure. They are aware they must have someone accompany them to their scheduled procedure to drive them home - they agree to the above and are willing to continue. Orders  Orders Placed This Encounter   Procedures    NM GASTRIC EMPTYING     Standing Status:   Future     Standing Expiration Date:   12/16/2022     Order Specific Question:   Reason for exam:     Answer:   nausea     Medications  No orders of the defined types were placed in this encounter.         Patient History:     Past Medical History:   Diagnosis Date    Acute liver failure without hepatic coma 10/23/2018    Back pain     \"with tired legs as a result\"    Bladder cancer ([facility] 12/19/2018    Blood circulation, collateral     Carotid arterial disease [facility])     recent surgery    CKD (chronic kidney disease), 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/2018    COPD with acute lower respiratory infection ([facility] [address] 02/24/2021    Epigastric discomfort     GERD (gastroesophageal reflux disease)     Hyperlipidemia     Hypertension     Hypertension     Palliative care patient 10/23/2018    Pneumonia due to infectious organism 11/06/2018    Primary osteoarthritis of left knee 10/14/2018    PVD (peripheral vascular disease) ([facility])     Tremor     Tremor on Right side x 1-2 weeks per stepdaughter    Type 2 diabetes mellitus with complication, without long-term current use of insulin ([facility] [address] 01/21/2021       Past Surgical History:   Procedure Laterality Date    BACK SURGERY      CARDIAC CATHETERIZATION  03/23/2021    100% RCA    COLONOSCOPY  2007?  CYSTOSCOPY Bilateral 12/19/2018    CYSTOSCOPY, BIOSPY FULGURATION OF BLADDER TUMOR POSSIBLE TURBT, RETROGRADE PYELOGRAM performed by Alessandra Donald MD at \[facility]"" 43 COLONOSCOPY FLX DX W/COLLJ SPEC WHEN PFRMD N/A 09/11/2017    Dr Khan Allen internal hemorrhoids, diverticular disease-HP-No recall (age)   [facility] [place] REVISE MEDIAN N/CARPAL TUNNEL SURG Left 07/18/2018    OPEN CARPAL TUNNEL RELEASE performed by Yevgeniy Scott MD at [address] Left 08/28/2018    LEFT CAROTID ENDARTERECTOMY WITH VEIN PATCH ANGIOPLASTY AND COMPLETION ANGIOGRAM performed by Phoebe Torres MD at [place] [address] Left 10/15/2018    LEFT COMPLEX TOTAL KNEE ARTHROPLASTY performed by Yevgeniy Scott MD at [facility] ENDOSCOPY N/A 11/18/2021    Dr Blake Grain, Sub prep lg amount of solid and semisolid food/Medication in stomach body/antrum/pylorus, stomach lumen appeared to distended w air insufflation and collapse upon suction,   inadequate exam sugg of gastroparesis, repeat in 10-14 days    VASCULAR SURGERY  04/21/2015    Angy BESS Ultrasound guided access of left common femoral artery. Aortogram.Diagnostic right lower extremity arteriogram.Radiologic supervision and interpretation.  VASCULAR SURGERY  2015    Gerald Olguin M.D Atherectomy,angioplasty,and stenting of left superficial femoral artery.  VASCULAR SURGERY  2014    Gerald Olguin M.D. Ultrasound-guided access of right common femoral artery. Aortogram.Left lower extremity arteriogram.Atherectomy and angioplasty of left superficial femoral artery. Radiologic supervision and interpretation.  VASCULAR SURGERY  2013    Gerald BESS Aortogram.Multistation arteriogram right lower extremity. Laser atherectomy and angioplasty of right superficial femoral artery. Selective catheterization of right tibioperoneal trunk. Angioplasty of peroneal artery and tibioperoneal trunk.  VASCULAR SURGERY  10/30/2018    SJS. Ultrasound guided cannulation of right internal vein. Placement of right internal jugular vein tunneled dialysis catheter bard equistream xk 23cm tip to cuff    VASCULAR SURGERY  2018    SJS. Removal of tunneled dilaysis catheter right internal jugular vein. Family History   Problem Relation Age of Onset    Colon Cancer Father     Diabetes Brother     Colon Polyps Neg Hx     Liver Cancer Neg Hx     Liver Disease Neg Hx     Esophageal Cancer Neg Hx     Rectal Cancer Neg Hx     Stomach Cancer Neg Hx        Social History     Socioeconomic History    Marital status:      Spouse name: None    Number of children: None    Years of education: None    Highest education level: None   Occupational History    None   Tobacco Use    Smoking status: Former Smoker     Quit date: 6/3/2003     Years since quittin.5    Smokeless tobacco: Never Used   Vaping Use    Vaping Use: Never used   Substance and Sexual Activity    Alcohol use:  Yes     Alcohol/week: 12.0 standard drinks     Types: 12 Glasses of wine per week     Comment: 2 glasses of wine every BY MOUTH EVERY DAY      dexlansoprazole (DEXILANT) 60 MG CPDR delayed release capsule Take 60 mg by mouth daily       bumetanide (BUMEX) 1 MG tablet Take 1 mg by mouth daily      amiodarone (CORDARONE) 200 MG tablet TAKE 1 TABLET BY MOUTH DAILY 30 tablet 3    mirabegron (MYRBETRIQ) 25 MG TB24 Take 1 tablet by mouth daily 90 tablet 3    aspirin 81 MG EC tablet Take 1 tablet by mouth daily 30 tablet 3    atorvastatin (LIPITOR) 20 MG tablet Take 1 tablet by mouth daily 30 tablet 0    metoprolol succinate (TOPROL XL) 100 MG extended release tablet Take 1 tablet by mouth daily 30 tablet 3    NIFEdipine (ADALAT CC) 60 MG extended release tablet Take 1 tablet by mouth daily 30 tablet 3    ferrous sulfate (IRON 325) 325 (65 Fe) MG tablet Take 1 tablet by mouth 2 times daily (with meals) 30 tablet 3    folic acid (FOLVITE) 1 MG tablet Take 1 tablet by mouth daily 30 tablet 3    vitamin D (ERGOCALCIFEROL) 1.25 MG (59807 UT) CAPS capsule Take 1 capsule by mouth once a week 5 capsule 0    OXYGEN Inhale 2 L into the lungs continuous 1 Container 5    tamsulosin (FLOMAX) 0.4 MG capsule Take 2 capsules by mouth daily (Patient not taking: Reported on 12/16/2021) 60 capsule 11    hydrocortisone (ANUSOL-HC) 25 MG suppository Place 1 suppository rectally 2 times daily as needed for Hemorrhoids (Patient not taking: Reported on 12/16/2021) 6 suppository 0     No current facility-administered medications for this visit. Allergies   Allergen Reactions    Eliquis [Apixaban] Other (See Comments)     \"almost bled to death\"    Promethazine Hcl Other (See Comments)     He became encephalopathic for several hours and was not responsive. Review of Systems   Constitutional: Negative for activity change, appetite change, fatigue, fever and unexpected weight change. HENT: Negative for trouble swallowing. Respiratory: Negative for cough, choking and shortness of breath. Cardiovascular: Negative for chest pain. Gastrointestinal: Positive for constipation. Negative for abdominal distention, abdominal pain, anal bleeding, blood in stool, diarrhea, nausea, rectal pain and vomiting. Reflux   Musculoskeletal: Positive for gait problem. Allergic/Immunologic: Negative for food allergies. Neurological: Positive for weakness. All other systems reviewed and are negative. Objective:     /60 (Site: Left Upper Arm)   Pulse 60   Ht 6' (1.829 m)   Wt 209 lb (94.8 kg)   SpO2 100%   BMI 28.35 kg/m²     Physical Exam  Vitals reviewed. Constitutional:       General: He is not in acute distress. Appearance: He is well-developed. HENT:      Head: Normocephalic and atraumatic. Right Ear: External ear normal.      Left Ear: External ear normal.      Nose: Nose normal.      Comments: Mask on     Mouth/Throat:      Comments: Mask on  Eyes:      General: No scleral icterus. Right eye: No discharge. Left eye: No discharge. Conjunctiva/sclera: Conjunctivae normal.      Pupils: Pupils are equal, round, and reactive to light. Cardiovascular:      Rate and Rhythm: Normal rate and regular rhythm. Heart sounds: Normal heart sounds. No murmur heard. Pulmonary:      Effort: Pulmonary effort is normal. No respiratory distress. Breath sounds: Decreased breath sounds present. No wheezing or rales. Abdominal:      General: Bowel sounds are normal. There is no distension. Palpations: Abdomen is soft. There is no mass. Tenderness: There is no abdominal tenderness. There is no guarding or rebound. Musculoskeletal:         General: Normal range of motion. Cervical back: Normal range of motion and neck supple. Skin:     General: Skin is warm and dry. Coloration: Skin is not pale. Neurological:      Mental Status: He is alert and oriented to person, place, and time. Motor: Weakness (generalized) present. Gait: Gait abnormal (in wheelchair). Psychiatric:         Behavior: Behavior normal.

## 2021-12-16 NOTE — TELEPHONE ENCOUNTER
PT called stating that he was on Monday, pt is wanting  A nurse to call him back, to know how long he will have to in and out cath

## 2021-12-21 NOTE — PROGRESS NOTES
Marissa Cheema is a [de-identified] y.o. male who presents today   Chief Complaint   Patient presents with    Follow-up     I am here today for a 1 wk follow up for BPH       Patient is a 81-year-old male whose prior by Dr. Yury Cates for bladder cancer. Last cystoscopy on 12/13/2021. He had presented to the clinic with worsening lower urinary tract symptoms such as frequency and urgency he was already maintained on tamsulosin and Myrbetriq I then added trospium. He did follow-up with decrease in symptoms as well as a bladder scan of 0 mL. Unfortunately developed severe constipation and required an indwelling Shultz catheter. He was unable to void in the office therefore required in and out catheterization. He states he did this for about 3 to 4 days and has been off of this for about 4 days now. He feels he is emptying his bladder well. Denies any significant lower urinary tract symptoms. AUA score is 6/35 main complaints of nocturia 3 times per night. Unable to leave urine specimen today although bladder scan revealed 0 mL.      Past Medical History:   Diagnosis Date    Acute liver failure without hepatic coma 10/23/2018    Back pain     \"with tired legs as a result\"    Bladder cancer ([address] 12/19/2018    Blood circulation, collateral     Carotid arterial disease ([facility])     recent surgery    CKD (chronic kidney disease), 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/2018    COPD with acute lower respiratory infection ([address] 02/24/2021    Epigastric discomfort     GERD (gastroesophageal reflux disease)     Hyperlipidemia     Hypertension     Hypertension     Palliative care patient 10/23/2018    Pneumonia due to infectious organism 11/06/2018    Primary osteoarthritis of left knee 10/14/2018    PVD (peripheral vascular disease) (HCC)     Tremor     Tremor on Right side x 1-2 weeks per stepdaughter    Type 2 diabetes mellitus with complication, without long-term current use of insulin ([address] 01/21/2021       Past Surgical History: Procedure Laterality Date    BACK SURGERY      CARDIAC CATHETERIZATION  03/23/2021    100% RCA    COLONOSCOPY  2007?  CYSTOSCOPY Bilateral 12/19/2018    CYSTOSCOPY, BIOSPY FULGURATION OF BLADDER TUMOR POSSIBLE TURBT, RETROGRADE PYELOGRAM performed by Joanna Bower MD at [address] COLONOSCOPY FLX DX W/COLLJ SPEC WHEN PFRMD N/A 09/11/2017    Dr Jesus Joel internal hemorrhoids, diverticular disease-HP-No recall (age)   [facility] [place] REVISE MEDIAN N/CARPAL TUNNEL SURG Left 07/18/2018    OPEN CARPAL TUNNEL RELEASE performed by Margie Amezquita MD at [address] Left 08/28/2018    LEFT CAROTID ENDARTERECTOMY WITH VEIN PATCH ANGIOPLASTY AND COMPLETION ANGIOGRAM performed by Cynthia Francois MD at [address] Left 10/15/2018    LEFT COMPLEX TOTAL KNEE ARTHROPLASTY performed by Margie Amezquita MD at [facility] ENDOSCOPY N/A 11/18/2021    Dr Myla Amaro, Sub prep lg amount of solid and semisolid food/Medication in stomach body/antrum/pylorus, stomach lumen appeared to distended w air insufflation and collapse upon suction,   inadequate exam sugg of gastroparesis, repeat in 10-14 days    VASCULAR SURGERY  04/21/2015    Ashley BESS Ultrasound guided access of left common femoral artery. Aortogram.Diagnostic right lower extremity arteriogram.Radiologic supervision and interpretation.  VASCULAR SURGERY  01/13/2015    Ashley Quesada M.D Atherectomy,angioplasty,and stenting of left superficial femoral artery.  VASCULAR SURGERY  03/11/2014    Ashley Quesada M.D. Ultrasound-guided access of right common femoral artery. Aortogram.Left lower extremity arteriogram.Atherectomy and angioplasty of left superficial femoral artery. Radiologic supervision and interpretation.  VASCULAR SURGERY  01/18/2013    Ashley BESS Aortogram.Multistation arteriogram right lower extremity. Laser atherectomy and angioplasty of right superficial femoral artery. Selective catheterization of right tibioperoneal trunk. Angioplasty of peroneal artery and tibioperoneal trunk.  VASCULAR SURGERY  10/30/2018    SJS. Ultrasound guided cannulation of right internal vein. Placement of right internal jugular vein tunneled dialysis catheter bard tian fry 23cm tip to cuff    VASCULAR SURGERY  12/17/2018    SJS. Removal of tunneled dilaysis catheter right internal jugular vein.        Current Outpatient Medications   Medication Sig Dispense Refill    UNABLE TO FIND Take 20 mg by mouth daily trosopium      tamsulosin (FLOMAX) 0.4 MG capsule Take 2 capsules by mouth daily 60 capsule 11    Plecanatide (TRULANCE) 3 MG TABS Take 3 mg by mouth daily (Patient taking differently: Take 3 mg by mouth as needed (for constipation) ) 30 tablet 3    polyethylene glycol (GLYCOLAX) 17 g packet Take 17 g by mouth daily 527 g 1    pantoprazole (PROTONIX) 20 MG tablet Take 20 mg by mouth daily      SENNA PLUS 8.6-50 MG per tablet       glipiZIDE (GLUCOTROL XL) 10 MG extended release tablet TAKE 1 TABLET BY MOUTH EVERY DAY      dexlansoprazole (DEXILANT) 60 MG CPDR delayed release capsule Take 60 mg by mouth daily       bumetanide (BUMEX) 1 MG tablet Take 1 mg by mouth daily      amiodarone (CORDARONE) 200 MG tablet TAKE 1 TABLET BY MOUTH DAILY 30 tablet 3    mirabegron (MYRBETRIQ) 25 MG TB24 Take 1 tablet by mouth daily 90 tablet 3    aspirin 81 MG EC tablet Take 1 tablet by mouth daily 30 tablet 3    atorvastatin (LIPITOR) 20 MG tablet Take 1 tablet by mouth daily 30 tablet 0    metoprolol succinate (TOPROL XL) 100 MG extended release tablet Take 1 tablet by mouth daily 30 tablet 3    NIFEdipine (ADALAT CC) 60 MG extended release tablet Take 1 tablet by mouth daily 30 tablet 3    ferrous sulfate (IRON 325) 325 (65 Fe) MG tablet Take 1 tablet by mouth 2 times daily (with meals) 30 tablet 3    folic acid (FOLVITE) 1 MG tablet Take 1 tablet by mouth daily 30 tablet 3    vitamin D (ERGOCALCIFEROL) 1.25 MG (60868 UT) CAPS capsule Take 1 capsule by mouth once a week 5 capsule 0    OXYGEN Inhale 2 L into the lungs continuous 1 Container 5     No current facility-administered medications for this visit. Allergies   Allergen Reactions    Eliquis [Apixaban] Other (See Comments)     \"almost bled to death\"    Promethazine Hcl Other (See Comments)     He became encephalopathic for several hours and was not responsive. Social History     Socioeconomic History    Marital status:      Spouse name: None    Number of children: None    Years of education: None    Highest education level: None   Occupational History    None   Tobacco Use    Smoking status: Former Smoker     Quit date: 6/3/2003     Years since quittin.5    Smokeless tobacco: Never Used   Vaping Use    Vaping Use: Never used   Substance and Sexual Activity    Alcohol use: Yes     Alcohol/week: 12.0 standard drinks     Types: 12 Glasses of wine per week     Comment: 2 glasses of wine every night    Drug use: No    Sexual activity: Yes     Partners: Female   Other Topics Concern    None   Social History Narrative    None     Social Determinants of Health     Financial Resource Strain:     Difficulty of Paying Living Expenses: Not on file   Food Insecurity:     Worried About Running Out of Food in the Last Year: Not on file    Ankit of Food in the Last Year: Not on file   Transportation Needs:     Lack of Transportation (Medical): Not on file    Lack of Transportation (Non-Medical):  Not on file   Physical Activity:     Days of Exercise per Week: Not on file    Minutes of Exercise per Session: Not on file   Stress:     Feeling of Stress : Not on file   Social Connections:     Frequency of Communication with Friends and Family: Not on file    Frequency of Social Gatherings with Friends and Family: Not on file    Attends [employer] Services: Not on file   [facility] or Organizations: Not on file    Attends Club or Organization Meetings: Not on file    Marital Status: Not on file   Intimate Partner Violence:     Fear of Current or Ex-Partner: Not on file    Emotionally Abused: Not on file    Physically Abused: Not on file    Sexually Abused: Not on file   Housing Stability:     Unable to Pay for Housing in the Last Year: Not on file    Number of [place] in the Last Year: Not on file    Unstable Housing in the Last Year: Not on file       Family History   Problem Relation Age of Onset    Colon Cancer Father     Diabetes Brother     Colon Polyps Neg Hx     Liver Cancer Neg Hx     Liver Disease Neg Hx     Esophageal Cancer Neg Hx     Rectal Cancer Neg Hx     Stomach Cancer Neg Hx        REVIEW OF SYSTEMS:  Review of Systems   Constitutional: Negative for chills and fever. Gastrointestinal: Negative for abdominal distention, abdominal pain, nausea and vomiting. Genitourinary: Negative for difficulty urinating, dysuria, flank pain, frequency, hematuria and urgency. Musculoskeletal: Negative for back pain and gait problem. Neurological: Positive for weakness. Psychiatric/Behavioral: Negative for agitation and confusion. PHYSICAL EXAM:  Temp 98.4 °F (36.9 °C) (Temporal)   Ht 6' (1.829 m)   Wt 214 lb (97.1 kg)   BMI 29.02 kg/m²   Physical Exam  Vitals and nursing note reviewed. Constitutional:       General: He is not in acute distress. Appearance: Normal appearance. He is not ill-appearing. Pulmonary:      Effort: Pulmonary effort is normal. No respiratory distress. Abdominal:      General: There is no distension. Tenderness: There is no abdominal tenderness. There is no right CVA tenderness or left CVA tenderness. Neurological:      Mental Status: He is alert and oriented to person, place, and time. Mental status is at baseline. Motor: Weakness present.       Gait: Gait abnormal. Psychiatric:         Mood and Affect: Mood normal.         Behavior: Behavior normal.         IMAGING:  Bladder Scan interpretation  Estimation of residual urine via abdominal ultrasound  Residual Urine: 0 ml  Indication: urinary retention  Position: Supine  Examination: Incremental scanning of the suprapubic area using 3 MHz transducer using copious amounts of acoustic gel. Findings: An anechoic area was demonstrated which represented the bladder, with measurement of residual urine as noted. 1. Benign prostatic hyperplasia with urinary retention  Trospium was discontinued at last visit. Patient did require in and out catheterization for several days although has not required this for several days now. Feels he is emptying his bladder. Unable to leave urine specimen today, bladder scan revealed 0 mL. Continue tamsulosin and Myrbetriq 25 mg a day. - ME Measure, post-void residual, US, non-imaging    2. History of bladder cancer  History of bladder cancer recent cystoscopy on 12/13. Lab and follow-up in 6 months for routine surveillance cystoscopy with Dr. Atul Cornejo. Orders Placed This Encounter   Procedures    ME Measure, post-void residual, US, non-imaging        Return in about 6 months (around 6/22/2022) for with Dr. Atul Cornejo, cystoscopy. All information inputted into the note by the MA to include chief complaint, past medical history, past surgical history, medications, allergies, social and family history and review of systems has been reviewed and updated as needed by me. EMR Dragon/transcription disclaimer: Much of this documentt is electronic  transcription/translation of spoken language to printed text. The  electronic translation of spoken language may be erroneous, or at times,  nonsensical words or phrases may be inadvertently transcribed.  Although I  have reviewed the document for such errors, some may still exist.

## 2021-12-22 ENCOUNTER — OFFICE VISIT (OUTPATIENT)
Dept: UROLOGY | Age: 81
End: 2021-12-22
Payer: MEDICARE

## 2021-12-22 VITALS — WEIGHT: 214 LBS | BODY MASS INDEX: 28.99 KG/M2 | HEIGHT: 72 IN | TEMPERATURE: 98.4 F

## 2021-12-22 DIAGNOSIS — Z85.51 HISTORY OF BLADDER CANCER: ICD-10-CM

## 2021-12-22 DIAGNOSIS — R33.8 BENIGN PROSTATIC HYPERPLASIA WITH URINARY RETENTION: Primary | ICD-10-CM

## 2021-12-22 DIAGNOSIS — N40.1 BENIGN PROSTATIC HYPERPLASIA WITH URINARY RETENTION: Primary | ICD-10-CM

## 2021-12-22 PROCEDURE — G8482 FLU IMMUNIZE ORDER/ADMIN: HCPCS | Performed by: NURSE PRACTITIONER

## 2021-12-22 PROCEDURE — 4040F PNEUMOC VAC/ADMIN/RCVD: CPT | Performed by: NURSE PRACTITIONER

## 2021-12-22 PROCEDURE — 1111F DSCHRG MED/CURRENT MED MERGE: CPT | Performed by: NURSE PRACTITIONER

## 2021-12-22 PROCEDURE — 1036F TOBACCO NON-USER: CPT | Performed by: NURSE PRACTITIONER

## 2021-12-22 PROCEDURE — 1123F ACP DISCUSS/DSCN MKR DOCD: CPT | Performed by: NURSE PRACTITIONER

## 2021-12-22 PROCEDURE — 51798 US URINE CAPACITY MEASURE: CPT | Performed by: NURSE PRACTITIONER

## 2021-12-22 PROCEDURE — G8417 CALC BMI ABV UP PARAM F/U: HCPCS | Performed by: NURSE PRACTITIONER

## 2021-12-22 PROCEDURE — G8427 DOCREV CUR MEDS BY ELIG CLIN: HCPCS | Performed by: NURSE PRACTITIONER

## 2021-12-22 PROCEDURE — 99213 OFFICE O/P EST LOW 20 MIN: CPT | Performed by: NURSE PRACTITIONER

## 2021-12-22 ASSESSMENT — ENCOUNTER SYMPTOMS
ABDOMINAL PAIN: 0
NAUSEA: 0
BACK PAIN: 0
VOMITING: 0
ABDOMINAL DISTENTION: 0

## 2021-12-23 LAB — HBA1C MFR BLD: 6.2 % (ref 4–6)

## 2021-12-31 VITALS
SYSTOLIC BLOOD PRESSURE: 138 MMHG | HEART RATE: 80 BPM | DIASTOLIC BLOOD PRESSURE: 60 MMHG | OXYGEN SATURATION: 98 % | TEMPERATURE: 98 F | RESPIRATION RATE: 20 BRPM

## 2022-01-04 ENCOUNTER — HOSPITAL ENCOUNTER (OUTPATIENT)
Dept: NUCLEAR MEDICINE | Age: 82
Discharge: HOME | End: 2022-01-06
Payer: MEDICARE

## 2022-01-04 DIAGNOSIS — K31.89 RETAINED FOOD IN STOMACH: ICD-10-CM

## 2022-01-04 PROCEDURE — A9540 TC99M MAA: HCPCS | Performed by: NURSE PRACTITIONER

## 2022-01-04 PROCEDURE — 3430000000 HC RX DIAGNOSTIC RADIOPHARMACEUTICAL: Performed by: NURSE PRACTITIONER

## 2022-01-04 PROCEDURE — 78264 GASTRIC EMPTYING IMG STUDY: CPT

## 2022-01-04 RX ADMIN — Medication 2 MILLICURIE: at 11:41

## 2022-01-05 ENCOUNTER — ANESTHESIA EVENT (OUTPATIENT)
Dept: ENDOSCOPY | Age: 82
End: 2022-01-05
Payer: MEDICARE

## 2022-01-06 ENCOUNTER — HOSPITAL ENCOUNTER (OUTPATIENT)
Age: 82
Setting detail: OUTPATIENT SURGERY
Discharge: HOME | End: 2022-01-06
Attending: INTERNAL MEDICINE | Admitting: INTERNAL MEDICINE
Payer: MEDICARE

## 2022-01-06 ENCOUNTER — ANESTHESIA (OUTPATIENT)
Dept: ENDOSCOPY | Age: 82
End: 2022-01-06
Payer: MEDICARE

## 2022-01-06 VITALS
OXYGEN SATURATION: 98 % | DIASTOLIC BLOOD PRESSURE: 54 MMHG | WEIGHT: 214 LBS | TEMPERATURE: 98.3 F | RESPIRATION RATE: 16 BRPM | BODY MASS INDEX: 28.99 KG/M2 | HEART RATE: 59 BPM | HEIGHT: 72 IN | SYSTOLIC BLOOD PRESSURE: 120 MMHG

## 2022-01-06 VITALS
SYSTOLIC BLOOD PRESSURE: 127 MMHG | DIASTOLIC BLOOD PRESSURE: 63 MMHG | RESPIRATION RATE: 18 BRPM | OXYGEN SATURATION: 96 %

## 2022-01-06 LAB
GLUCOSE BLD-MCNC: 93 MG/DL (ref 70–99)
PERFORMED ON: NORMAL

## 2022-01-06 PROCEDURE — 2580000003 HC RX 258: Performed by: INTERNAL MEDICINE

## 2022-01-06 PROCEDURE — 6360000002 HC RX W HCPCS: Performed by: NURSE ANESTHETIST, CERTIFIED REGISTERED

## 2022-01-06 PROCEDURE — 82947 ASSAY GLUCOSE BLOOD QUANT: CPT

## 2022-01-06 PROCEDURE — 3700000001 HC ADD 15 MINUTES (ANESTHESIA): Performed by: INTERNAL MEDICINE

## 2022-01-06 PROCEDURE — 7100000011 HC PHASE II RECOVERY - ADDTL 15 MIN: Performed by: INTERNAL MEDICINE

## 2022-01-06 PROCEDURE — 3700000000 HC ANESTHESIA ATTENDED CARE: Performed by: INTERNAL MEDICINE

## 2022-01-06 PROCEDURE — 7100000010 HC PHASE II RECOVERY - FIRST 15 MIN: Performed by: INTERNAL MEDICINE

## 2022-01-06 PROCEDURE — 43239 EGD BIOPSY SINGLE/MULTIPLE: CPT | Performed by: INTERNAL MEDICINE

## 2022-01-06 PROCEDURE — 2500000003 HC RX 250 WO HCPCS: Performed by: NURSE ANESTHETIST, CERTIFIED REGISTERED

## 2022-01-06 PROCEDURE — 2709999900 HC NON-CHARGEABLE SUPPLY: Performed by: INTERNAL MEDICINE

## 2022-01-06 PROCEDURE — 3609012400 HC EGD TRANSORAL BIOPSY SINGLE/MULTIPLE: Performed by: INTERNAL MEDICINE

## 2022-01-06 RX ORDER — LIDOCAINE HYDROCHLORIDE 10 MG/ML
INJECTION, SOLUTION INFILTRATION; PERINEURAL PRN
Status: DISCONTINUED | OUTPATIENT
Start: 2022-01-06 | End: 2022-01-06 | Stop reason: SDUPTHER

## 2022-01-06 RX ORDER — SODIUM CHLORIDE, SODIUM LACTATE, POTASSIUM CHLORIDE, CALCIUM CHLORIDE 600; 310; 30; 20 MG/100ML; MG/100ML; MG/100ML; MG/100ML
INJECTION, SOLUTION INTRAVENOUS CONTINUOUS
Status: DISCONTINUED | OUTPATIENT
Start: 2022-01-06 | End: 2022-01-06 | Stop reason: HOSPADM

## 2022-01-06 RX ORDER — PROPOFOL 10 MG/ML
INJECTION, EMULSION INTRAVENOUS PRN
Status: DISCONTINUED | OUTPATIENT
Start: 2022-01-06 | End: 2022-01-06 | Stop reason: SDUPTHER

## 2022-01-06 RX ADMIN — LIDOCAINE HYDROCHLORIDE 50 MG: 10 INJECTION, SOLUTION INFILTRATION; PERINEURAL at 11:36

## 2022-01-06 RX ADMIN — SODIUM CHLORIDE, POTASSIUM CHLORIDE, SODIUM LACTATE AND CALCIUM CHLORIDE: 600; 310; 30; 20 INJECTION, SOLUTION INTRAVENOUS at 10:38

## 2022-01-06 RX ADMIN — PROPOFOL 140 MG: 10 INJECTION, EMULSION INTRAVENOUS at 11:36

## 2022-01-06 ASSESSMENT — ENCOUNTER SYMPTOMS: SHORTNESS OF BREATH: 1

## 2022-01-06 ASSESSMENT — PAIN - FUNCTIONAL ASSESSMENT: PAIN_FUNCTIONAL_ASSESSMENT: 0-10

## 2022-01-06 ASSESSMENT — PAIN SCALES - GENERAL
PAINLEVEL_OUTOF10: 0
PAINLEVEL_OUTOF10: 0

## 2022-01-06 NOTE — H&P
Patient Name: Brandi Orosco  : 1940  MRN: [id number]  DATE: 22    Allergies: Allergies   Allergen Reactions    Eliquis [Apixaban] Other (See Comments)     \"almost bled to death\"    Promethazine Hcl Other (See Comments)     He became encephalopathic for several hours and was not responsive. ENDOSCOPY  History and Physical    Procedure:    [] Diagnostic Colonoscopy       [] Screening Colonoscopy  [x] EGD      [] ERCP      [] EUS       [] Other    [x] Previous office notes/History and Physical reviewed from the patients chart. Please see EMR for further details of HPI. I have examined the patient's status immediately prior to the procedure and:      Indications/HPI:  1. Retained food in stomach -compromising quality of exam during his last EGD requiring this repeat exam today   2. Chronic GERD    3. Constipation, unspecified constipation type      []Abdominal Pain   []Cancer- GI/Lung     []Fhx of colon CA/polyps  []History of Polyps  []Barretts            []Melena  []Abnormal Imaging              []Dysphagia              []Persistent Pneumonia   []Anemia                            []Food Impaction        []History of Polyps  [] GI Bleed             []Pulmonary nodule/Mass   []Change in bowel habits []Heartburn/Reflux  []Rectal Bleed (BRBPR)  []Chest Pain - Non Cardiac []Heme (+) Stool []Ulcers  []Constipation  []Hemoptysis  []Varices  []Diarrhea  []Hypoxemia    []Nausea/Vomiting   []Screening   []Crohns/Colitis  []Other:     Anesthesia:   [x] MAC [] Moderate Sedation   [] General   [] None     ROS: 12 pt Review of Symptoms was negative unless mentioned above    Medications:   Prior to Admission medications    Medication Sig Start Date End Date Taking?  Authorizing Provider   tamsulosin (FLOMAX) 0.4 MG capsule Take 2 capsules by mouth daily 21  Yes Jose Vasquez MD   Plecanatide (TRULANCE) 3 MG TABS Take 3 mg by mouth daily  Patient taking differently: Take 3 mg by mouth as needed (for constipation)  12/2/21  Yes Torin Orr APRN - NP   pantoprazole (PROTONIX) 20 MG tablet Take 20 mg by mouth daily   Yes Historical Provider, MD   SENYRN PLUS 8.6-50 MG per tablet  10/26/21  Yes Historical Provider, MD   glipiZIDE (GLUCOTROL XL) 10 MG extended release tablet TAKE 1 TABLET BY MOUTH EVERY DAY 10/19/21  Yes Historical Provider, MD   dexlansoprazole (DEXILANT) 60 MG CPDR delayed release capsule Take 60 mg by mouth daily    Yes Historical Provider, MD   bumetanide (BUMEX) 1 MG tablet Take 1 mg by mouth daily   Yes Historical Provider, MD   amiodarone (CORDARONE) 200 MG tablet TAKE 1 TABLET BY MOUTH DAILY 9/9/21  Yes ELISABETH Márquez   mirabegron (MYRBETRIQ) 25 MG TB24 Take 1 tablet by mouth daily 8/16/21  Yes Peggy Santiago APRN - CNP   atorvastatin (LIPITOR) 20 MG tablet Take 1 tablet by mouth daily 3/29/21  Yes Trevor Mehta MD   metoprolol succinate (TOPROL XL) 100 MG extended release tablet Take 1 tablet by mouth daily 3/29/21  Yes Trevor Mehta MD   NIFEdipine (ADALAT CC) 60 MG extended release tablet Take 1 tablet by mouth daily 3/29/21  Yes Trevor Mehta MD   ferrous sulfate (IRON 325) 325 (65 Fe) MG tablet Take 1 tablet by mouth 2 times daily (with meals) 3/29/21  Yes Trevor Mehta MD   folic acid (FOLVITE) 1 MG tablet Take 1 tablet by mouth daily 3/29/21  Yes Trevor Mehta MD   UNABLE TO FIND Take 20 mg by mouth daily trosopium    Historical Provider, MD   aspirin 81 MG EC tablet Take 1 tablet by mouth daily 3/29/21   Trevor Mehta MD   vitamin D (ERGOCALCIFEROL) 1.25 MG (78008 UT) CAPS capsule Take 1 capsule by mouth once a week 3/29/21   Trevor Mehta MD   OXYGEN Inhale 2 L into the lungs continuous 3/29/21   Trevor Mehta MD       Past Medical History:  Past Medical History:   Diagnosis Date    Acute liver failure without hepatic coma 10/23/2018    Back pain     \"with tired legs as a result\"    Bladder cancer ([address] 12/19/2018    Blood circulation, collateral     Carotid arterial disease (HCC)     recent surgery    CKD (chronic kidney disease), 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/2018    Constipation     COPD with acute lower respiratory infection ([facility] 02/24/2021    Epigastric discomfort     GERD (gastroesophageal reflux disease)     Hyperlipidemia     Hypertension     Hypertension     Palliative care patient 10/23/2018    Pneumonia due to infectious organism 11/06/2018    Primary osteoarthritis of left knee 10/14/2018    PVD (peripheral vascular disease) (HCC)     Tremor     Tremor on Right side x 1-2 weeks per stepdaughter    Type 2 diabetes mellitus with complication, without long-term current use of insulin ([facility] 01/21/2021       Past Surgical History:  Past Surgical History:   Procedure Laterality Date    BACK SURGERY      CARDIAC CATHETERIZATION  03/23/2021    100% RCA    COLONOSCOPY  2007?     CYSTOSCOPY Bilateral 12/19/2018    CYSTOSCOPY, BIOSPY FULGURATION OF BLADDER TUMOR POSSIBLE TURBT, RETROGRADE PYELOGRAM performed by Nisha Mcdowell MD at [facility] COLONOSCOPY FLX DX W/COLLJ SPEC WHEN PFRMD N/A 09/11/2017    Dr Sandra Sanchez internal hemorrhoids, diverticular disease-HP-No recall (age)   T[place]is S[place] HI REVISE MEDIAN N/CARPAL TUNNEL SURG Left 07/18/2018    OPEN CARPAL TUNNEL RELEASE performed by Hansa Madrid MD at [address] Left 08/28/2018    LEFT CAROTID ENDARTERECTOMY WITH VEIN PATCH ANGIOPLASTY AND COMPLETION ANGIOGRAM performed by Davis Carbajal MD at [place] [address] Left 10/15/2018    LEFT COMPLEX TOTAL KNEE ARTHROPLASTY performed by Hansa Madrid MD at [facility] ENDOSCOPY N/A 11/18/2021    Dr Wicho Cao, Sub prep lg amount of solid and semisolid food/Medication in stomach body/antrum/pylorus, stomach lumen appeared to distended w air insufflation and collapse upon suction,   inadequate exam sugg of gastroparesis, repeat in 10-14 days   [place] VASCULAR SURGERY  2015    Isha BESS Ultrasound guided access of left common femoral artery. Aortogram.Diagnostic right lower extremity arteriogram.Radiologic supervision and interpretation.  VASCULAR SURGERY  2015    Isha Sullivan M.D Atherectomy,angioplasty,and stenting of left superficial femoral artery.  VASCULAR SURGERY  2014    Isha Sullivan M.D. Ultrasound-guided access of right common femoral artery. Aortogram.Left lower extremity arteriogram.Atherectomy and angioplasty of left superficial femoral artery. Radiologic supervision and interpretation.  VASCULAR SURGERY  2013    Isha BESS Aortogram.Multistation arteriogram right lower extremity. Laser atherectomy and angioplasty of right superficial femoral artery. Selective catheterization of right tibioperoneal trunk. Angioplasty of peroneal artery and tibioperoneal trunk.  VASCULAR SURGERY  10/30/2018    [facility]. Ultrasound guided cannulation of right internal vein. Placement of right internal jugular vein tunneled dialysis catheter bard equistream xk 23cm tip to cuff    VASCULAR SURGERY  2018    [facility]. Removal of tunneled dilaysis catheter right internal jugular vein. Social History:  Social History     Tobacco Use    Smoking status: Former Smoker     Packs/day: 2.00     Years: 48.00     Pack years: 96.00     Types: Cigarettes     Quit date: 6/3/2003     Years since quittin.6    Smokeless tobacco: Never Used   Vaping Use    Vaping Use: Never used   Substance Use Topics    Alcohol use:  Yes     Alcohol/week: 12.0 standard drinks     Types: 12 Glasses of wine per week     Comment: 2 glasses of wine every night    Drug use: No       Vital Signs:   Vitals:    22 1012   BP: (!) 148/68   Pulse: 60   Resp: 18   Temp: 98.9 °F (37.2 °C)   SpO2: 100%        Physical Exam:  Cardiac:  [x]WNL  []Comments:  Pulmonary:  [x]WNL   []Comments:  Neuro/Mental Status:  [x]WNL  []Comments:  Abdominal:  [x]WNL

## 2022-01-06 NOTE — ANESTHESIA PRE PROCEDURE
Department of Anesthesiology  Preprocedure Note       Name:  Nadia Orr   Age:  [de-identified] y.o.  :  1940                                          MRN:  [id number]         Date:  2022      Surgeon: Akanksha Mota):  Viktoria Rider MD    Procedure: Procedure(s):  EGD BIOPSY    Medications prior to admission:   Prior to Admission medications    Medication Sig Start Date End Date Taking?  Authorizing Provider   tamsulosin (FLOMAX) 0.4 MG capsule Take 2 capsules by mouth daily 21  Yes Nhi Ingram MD   Plecanatide (TRULANCE) 3 MG TABS Take 3 mg by mouth daily  Patient taking differently: Take 3 mg by mouth as needed (for constipation)  21  Yes ELISABETH Castle - NP   pantoprazole (PROTONIX) 20 MG tablet Take 20 mg by mouth daily   Yes Historical Provider, MD   SENNA PLUS 8.6-50 MG per tablet  10/26/21  Yes Historical Provider, MD   glipiZIDE (GLUCOTROL XL) 10 MG extended release tablet TAKE 1 TABLET BY MOUTH EVERY DAY 10/19/21  Yes Historical Provider, MD   dexlansoprazole (DEXILANT) 60 MG CPDR delayed release capsule Take 60 mg by mouth daily    Yes Historical Provider, MD   bumetanide (BUMEX) 1 MG tablet Take 1 mg by mouth daily   Yes Historical Provider, MD   amiodarone (CORDARONE) 200 MG tablet TAKE 1 TABLET BY MOUTH DAILY 21  Yes ELISABETH Lujan   mirabegron (MYRBETRIQ) 25 MG TB24 Take 1 tablet by mouth daily 21  Yes ELISABETH Champion - CNP   atorvastatin (LIPITOR) 20 MG tablet Take 1 tablet by mouth daily 3/29/21  Yes Isabell Rodriguez MD   metoprolol succinate (TOPROL XL) 100 MG extended release tablet Take 1 tablet by mouth daily 3/29/21  Yes Isabell Rodirguez MD   NIFEdipine (ADALAT CC) 60 MG extended release tablet Take 1 tablet by mouth daily 3/29/21  Yes Isabell Rodriguez MD   ferrous sulfate (IRON 325) 325 (65 Fe) MG tablet Take 1 tablet by mouth 2 times daily (with meals) 3/29/21  Yes Isabell Rodriguez MD   folic acid (FOLVITE) 1 MG tablet Take 1 tablet by mouth daily 3/29/21  Yes Verónica Oliva MD   UNABLE TO FIND Take 20 mg by mouth daily trosopium    Historical Provider, MD   aspirin 81 MG EC tablet Take 1 tablet by mouth daily 3/29/21   Verónica Oliva MD   vitamin D (ERGOCALCIFEROL) 1.25 MG (15717 UT) CAPS capsule Take 1 capsule by mouth once a week 3/29/21   Verónica Oliva MD   OXYGEN Inhale 2 L into the lungs continuous 3/29/21   Verónica Oliva MD       Current medications:    Current Facility-Administered Medications   Medication Dose Route Frequency Provider Last Rate Last Admin    lactated ringers infusion   IntraVENous Continuous Jorgito MD Davonte 100 mL/hr at 01/06/22 1038 New Bag at 01/06/22 1038       Allergies: Allergies   Allergen Reactions    Eliquis [Apixaban] Other (See Comments)     \"almost bled to death\"    Promethazine Hcl Other (See Comments)     He became encephalopathic for several hours and was not responsive.        Problem List:    Patient Active Problem List   Diagnosis Code    Diverticulitis of large intestine with perforation without bleeding K57.20    Generalized abdominal pain R10.84    Colonic diverticular abscess K57.20    Bilateral carotid artery stenosis I65.23    Atherosclerosis of native artery of both lower extremities with intermittent claudication (HCC) I70.213    Carotid stenosis, asymptomatic, left I65.22    Primary osteoarthritis of left knee M17.12    Arthritis of knee M17.10    Essential hypertension I10    Pure hypercholesterolemia E78.00    Slow transit constipation K59.01    Iron deficiency anemia D50.9    GERD (gastroesophageal reflux disease) K21.9    Acute liver failure without hepatic coma K72.00    CHF (congestive heart failure) (HCC) I50.9    Bilateral pleural effusion J90    Palliative care patient Z51.5    Shock liver K72.00    Acute renal failure ([facility] N17.9    BPH associated with nocturia N40.1, R35.1    Bleeding diathesis (HCC) D69.9    Epistaxis R04.0    Acute blood loss anemia D62    Melena K92.1    Thrombocytopenia (HCC) D69.6    Hypocalcemia E83.51    Pneumonia due to infectious organism J18.9    Bladder cancer (HCC) C67.9    Postoperative pain G89.18    History of bladder cancer Z85.51    Severe sepsis (HCC) A41.9, R65.20    Chronic respiratory failure with hypoxia and hypercapnia (HCC) J96.11, J96.12    Acute on chronic kidney failure (HCC) N17.9, N18.9    Hypoxemia R88.18    Metabolic acidosis B96.2    Acidosis, metabolic, with respiratory acidosis E87.4    Acute respiratory failure (HCC) J96.00    Type 2 diabetes mellitus with complication, without long-term current use of insulin (HCC) E11.8    Weakness R53.1    Other dysphagia R13.19    Chronic midline low back pain without sciatica M54.50, G89.29    COPD with acute lower respiratory infection (HCC) J44.0    Chronic kidney disease N18.9    Recurrent pneumonia J18.9    Nonsustained ventricular tachycardia (HCC) I47.2    Atrial fibrillation ([facility]) I48.91    Vitamin D deficiency E55.9    Anemia D64.9    Alcohol use Z72.89    Pacemaker Z95.0    Class 1 obesity in adult E66.9    GREGORIO treated with BiPAP G47.33    Chronic diastolic congestive heart failure (HCC) I50.32    SOB (shortness of breath) R06.02    Hypoglycemia E16.2    Acute kidney injury superimposed on CKD (HCC) N17.9, N18.9    Constipation K59.00    Acute decompensated heart failure (HCC) I50.9    COPD with exacerbation ([facility]) J44.1       Past Medical History:        Diagnosis Date    Acute liver failure without hepatic coma 10/23/2018    Back pain     \"with tired legs as a result\"    Bladder cancer ([facility] 12/19/2018    Blood circulation, collateral     Carotid arterial disease (HCC)     recent surgery    CKD (chronic kidney disease), 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/2018    Constipation     COPD with acute lower respiratory infection ([facility] [facility] 02/24/2021    Epigastric discomfort     GERD (gastroesophageal reflux disease)     Hyperlipidemia     Hypertension  Hypertension     Palliative care patient 10/23/2018    Pneumonia due to infectious organism 11/06/2018    Primary osteoarthritis of left knee 10/14/2018    PVD (peripheral vascular disease) (HCC)     Tremor     Tremor on Right side x 1-2 weeks per stepdaughter    Type 2 diabetes mellitus with complication, without long-term current use of insulin ([facility] [address] [address] 01/21/2021       Past Surgical History:        Procedure Laterality Date    BACK SURGERY      CARDIAC CATHETERIZATION  03/23/2021    100% RCA    COLONOSCOPY  2007?  CYSTOSCOPY Bilateral 12/19/2018    CYSTOSCOPY, BIOSPY FULGURATION OF BLADDER TUMOR POSSIBLE TURBT, RETROGRADE PYELOGRAM performed by Codie Art MD at [facility] [address] COLONOSCOPY FLX DX W/COLLJ SPEC WHEN PFRMD N/A 09/11/2017    Dr Gloria Elaine internal hemorrhoids, diverticular disease-HP-No recall (age)   [place] REVISE MEDIAN N/CARPAL TUNNEL SURG Left 07/18/2018    OPEN CARPAL TUNNEL RELEASE performed by Magdy Perdue MD at [address] Left 08/28/2018    LEFT CAROTID ENDARTERECTOMY WITH VEIN PATCH ANGIOPLASTY AND COMPLETION ANGIOGRAM performed by Kelley Browning MD at [facility] Left 10/15/2018    LEFT COMPLEX TOTAL KNEE ARTHROPLASTY performed by Magdy Perdue MD at [facility] ENDOSCOPY N/A 11/18/2021    Dr Tmair Cabezas, Sub prep lg amount of solid and semisolid food/Medication in stomach body/antrum/pylorus, stomach lumen appeared to distended w air insufflation and collapse upon suction,   inadequate exam sugg of gastroparesis, repeat in 10-14 days    VASCULAR SURGERY  04/21/2015    Isha BESS Ultrasound guided access of left common femoral artery. Aortogram.Diagnostic right lower extremity arteriogram.Radiologic supervision and interpretation.     VASCULAR SURGERY  01/13/2015    Isha Sullivan M.D Atherectomy,angioplasty,and stenting of left superficial femoral artery.  VASCULAR SURGERY  2014    Anila Acosta M.D. Ultrasound-guided access of right common femoral artery. Aortogram.Left lower extremity arteriogram.Atherectomy and angioplasty of left superficial femoral artery. Radiologic supervision and interpretation.  VASCULAR SURGERY  2013    Anila BESS Aortogram.Multistation arteriogram right lower extremity. Laser atherectomy and angioplasty of right superficial femoral artery. Selective catheterization of right tibioperoneal trunk. Angioplasty of peroneal artery and tibioperoneal trunk.  VASCULAR SURGERY  10/30/2018    [facility]. Ultrasound guided cannulation of right internal vein. Placement of right internal jugular vein tunneled dialysis catheter bard equistream xk 23cm tip to cuff    VASCULAR SURGERY  2018    [facility]. Removal of tunneled dilaysis catheter right internal jugular vein. Social History:    Social History     Tobacco Use    Smoking status: Former Smoker     Packs/day: 2.00     Years: 48.00     Pack years: 96.00     Types: Cigarettes     Quit date: 6/3/2003     Years since quittin.6    Smokeless tobacco: Never Used   Substance Use Topics    Alcohol use:  Yes     Alcohol/week: 12.0 standard drinks     Types: 12 Glasses of wine per week     Comment: 2 glasses of wine every night                                Counseling given: Not Answered      Vital Signs (Current):   Vitals:    22 1012   BP: (!) 148/68   Pulse: 60   Resp: 18   Temp: 98.9 °F (37.2 °C)   TempSrc: Temporal   SpO2: 100%   Weight: 214 lb (97.1 kg)   Height: 6' (1.829 m)                                              BP Readings from Last 3 Encounters:   22 (!) 148/68   21 110/60   21 (!) 123/55       NPO Status: Time of last liquid consumption: 2200                        Time of last solid consumption: 1700 (just chickem broth)                        Date of last liquid consumption: 22                        Date of last solid food consumption: 01/05/22    BMI:   Wt Readings from Last 3 Encounters:   01/06/22 214 lb (97.1 kg)   12/22/21 214 lb (97.1 kg)   12/16/21 209 lb (94.8 kg)     Body mass index is 29.02 kg/m².     CBC:   Lab Results   Component Value Date    WBC 11.8 12/05/2021    RBC 4.20 12/05/2021    HGB 11.7 12/05/2021    HCT 39.1 12/05/2021    MCV 93.1 12/05/2021    RDW 13.7 12/05/2021     12/05/2021       CMP:   Lab Results   Component Value Date     12/05/2021    K 3.3 12/05/2021    K 4.3 11/23/2021     12/05/2021    CO2 31 12/05/2021    BUN 41 12/05/2021    CREATININE 2.8 12/05/2021    GFRAA 26 12/05/2021    LABGLOM 22 12/05/2021    GLUCOSE 86 12/05/2021    PROT 5.7 12/05/2021    CALCIUM 8.4 12/05/2021    BILITOT 0.4 12/05/2021    ALKPHOS 71 12/05/2021    AST 13 12/05/2021    ALT 15 12/05/2021       POC Tests:   Recent Labs     01/06/22  1017   POCGLU 93       Coags:   Lab Results   Component Value Date    PROTIME 13.4 12/05/2021    INR 1.00 12/05/2021    APTT 31.0 11/23/2021       HCG (If Applicable): No results found for: PREGTESTUR, PREGSERUM, HCG, HCGQUANT     ABGs:   Lab Results   Component Value Date    PHART 7.450 11/24/2021    PO2ART 72.0 11/24/2021    UFQ8LJL 40.0 11/24/2021    VFX8OXF 27.8 11/24/2021    BEART 3.5 11/24/2021    L0JEQBLF 92.9 11/24/2021        Type & Screen (If Applicable):  No results found for: LABABO, LABRH    Drug/Infectious Status (If Applicable):  No results found for: HIV, HEPCAB    COVID-19 Screening (If Applicable):   Lab Results   Component Value Date    COVID19 Not Detected 11/23/2021    COVID19 Not Detected 03/06/2021           Anesthesia Evaluation  Patient summary reviewed and Nursing notes reviewed no history of anesthetic complications:   Airway: Mallampati: II  TM distance: >3 FB   Neck ROM: full  Mouth opening: > = 3 FB Dental: normal exam         Pulmonary:normal exam    (+) COPD:  shortness of breath: chronic, Cardiovascular:    (+) dysrhythmias: atrial fibrillation, CHF:,       ECG reviewed  Rhythm: regular    Echocardiogram reviewed         Beta Blocker:  Not on Beta Blocker      ROS comment: Summary   Normal left ventricular chamber size and systolic function. Left ventricular ejection fraction is visually estimated at 60%. Grade 2 LV diastolic dysfunction. There is mild mitral regurgitation. Neuro/Psych:   Negative Neuro/Psych ROS              GI/Hepatic/Renal:   (+) GERD:,           Endo/Other:    (+) DiabetesType II DM, , blood dyscrasia: anemia:., malignancy/cancer (bladder). Pt had PAT visit. Abdominal:       Abdomen: soft. Vascular: negative vascular ROS. Other Findings:             Anesthesia Plan      general     ASA 4       Induction: intravenous. Anesthetic plan and risks discussed with patient.                       ELISABETH Ray - CRNA   1/6/2022

## 2022-01-06 NOTE — ANESTHESIA POSTPROCEDURE EVALUATION
Department of Anesthesiology  Postprocedure Note    Patient: Mariela Covarrubias  MRN: [id number]  YOB: 1940  Date of evaluation: 1/6/2022  Time:  11:42 AM     Procedure Summary     Date: 01/06/22 Room / Location: [address]    Anesthesia Start: 9583 Anesthesia Stop: 6210    Procedure: EGD BIOPSY (N/A Abdomen) Diagnosis: (WORSENING REFLUX, BELCHING,)    Surgeons: Julianna Pardo MD Responsible Provider: ELISABETH Cervantes CRNA    Anesthesia Type: general ASA Status: 4          Anesthesia Type: general    Cristy Phase I:      Cristy Phase II:      Last vitals: Reviewed and per EMR flowsheets.        Anesthesia Post Evaluation    Patient location during evaluation: bedside  Patient participation: complete - patient participated  Pain score: 0  Airway patency: patent  Nausea & Vomiting: no nausea and no vomiting  Complications: no  Cardiovascular status: hemodynamically stable  Respiratory status: acceptable, nonlabored ventilation, room air and spontaneous ventilation  Hydration status: euvolemic

## 2022-01-06 NOTE — OP NOTE
insufflation. Retroflexed views otherwise revealed a normal GE junction, fundus and cardia as well. Duodenum: Normal. Random biopsies were taken to check for Celiac disease and other causes of villous atrophy. RECOMMENDATIONS:    1. Await path results, the patient will be contacted in 7-10 days with biopsy results. 2.  Small frequent meals; chew food thoroughly prior to ingestion. 3.  - Resume previous meds and diet  4. Schedule outpatient upper GI EUS exam to further evaluate the small submucosal appearing gastric nodule noted today  - GI clinic f/u 4-6 weeks with Ms. Gagnon Pulling scheduled f/u appts with other MDs     - NO ASA/NSAIDs x 2 weeks    The results were discussed with the patient and family. A copy of the images obtained were given to the patient.      Morales Sanches MD, MD  1/6/2022  10:51 AM

## 2022-01-13 ENCOUNTER — OFFICE VISIT (OUTPATIENT)
Dept: PULMONOLOGY | Age: 82
End: 2022-01-13
Payer: MEDICARE

## 2022-01-13 VITALS
HEART RATE: 62 BPM | HEIGHT: 72 IN | WEIGHT: 213 LBS | TEMPERATURE: 97.7 F | BODY MASS INDEX: 28.85 KG/M2 | OXYGEN SATURATION: 99 %

## 2022-01-13 DIAGNOSIS — G47.33 OSA TREATED WITH BIPAP: ICD-10-CM

## 2022-01-13 DIAGNOSIS — J96.12 CHRONIC RESPIRATORY FAILURE WITH HYPOXIA AND HYPERCAPNIA (HCC): Primary | ICD-10-CM

## 2022-01-13 DIAGNOSIS — J96.11 CHRONIC RESPIRATORY FAILURE WITH HYPOXIA AND HYPERCAPNIA (HCC): Primary | ICD-10-CM

## 2022-01-13 DIAGNOSIS — J44.1 CHRONIC OBSTRUCTIVE PULMONARY DISEASE WITH (ACUTE) EXACERBATION (HCC): ICD-10-CM

## 2022-01-13 DIAGNOSIS — I50.32 CHRONIC DIASTOLIC CONGESTIVE HEART FAILURE (HCC): ICD-10-CM

## 2022-01-13 PROCEDURE — 1036F TOBACCO NON-USER: CPT | Performed by: INTERNAL MEDICINE

## 2022-01-13 PROCEDURE — G8417 CALC BMI ABV UP PARAM F/U: HCPCS | Performed by: INTERNAL MEDICINE

## 2022-01-13 PROCEDURE — 3023F SPIROM DOC REV: CPT | Performed by: INTERNAL MEDICINE

## 2022-01-13 PROCEDURE — 99214 OFFICE O/P EST MOD 30 MIN: CPT | Performed by: INTERNAL MEDICINE

## 2022-01-13 PROCEDURE — 1123F ACP DISCUSS/DSCN MKR DOCD: CPT | Performed by: INTERNAL MEDICINE

## 2022-01-13 PROCEDURE — G8427 DOCREV CUR MEDS BY ELIG CLIN: HCPCS | Performed by: INTERNAL MEDICINE

## 2022-01-13 PROCEDURE — 4040F PNEUMOC VAC/ADMIN/RCVD: CPT | Performed by: INTERNAL MEDICINE

## 2022-01-13 PROCEDURE — G8482 FLU IMMUNIZE ORDER/ADMIN: HCPCS | Performed by: INTERNAL MEDICINE

## 2022-01-13 ASSESSMENT — ENCOUNTER SYMPTOMS
SHORTNESS OF BREATH: 1
ABDOMINAL DISTENTION: 0
COUGH: 0
RHINORRHEA: 0
ABDOMINAL PAIN: 0
BACK PAIN: 0
CHEST TIGHTNESS: 0
WHEEZING: 0
APNEA: 1
ANAL BLEEDING: 0

## 2022-01-13 NOTE — PROGRESS NOTES
Pulmonary and Sleep Medicine    Suresh Walls (:  1940) is a [de-identified] y.o. male,Established patient, here for evaluation of the following chief complaint(s):  Follow-up (6 month)      Referring physician:  No referring provider defined for this encounter. ASSESSMENT/PLAN:  1. Chronic respiratory failure with hypoxia and hypercapnia ([address] on oxygen and bipap  2. Chronic obstructive pulmonary disease with (acute) exacerbation (HCC)  3. GREGORIO treated with BiPAP  4. Chronic diastolic congestive heart failure (HCC)        Continue noninvasive ventilation during sleep. He is compliant with machine. Continue oxygen supplementation during sleep. We reinforced the need to comply with dietary restrictions. Eric Goldberg MD, [facility]    Return in about 6 months (around 2022). SUBJECTIVE/OBJECTIVE:  He is here for follow-up on sleep apnea. He is using his positive pressure ventilation during sleep. He is also on oxygen during sleep. He had a pacemaker placed with need for recurrent hospitalization over the past few months for mostly congestive heart failure. He says he is having difficulty complying with the salt restriction and fluid restriction. Prior to Visit Medications    Medication Sig Taking?  Authorizing Provider   UNABLE TO FIND Take 20 mg by mouth daily trosopium Yes Historical Provider, MD   tamsulosin (FLOMAX) 0.4 MG capsule Take 2 capsules by mouth daily Yes Anitra Heredia MD   Plecanatide (TRULANCE) 3 MG TABS Take 3 mg by mouth daily  Patient taking differently: Take 3 mg by mouth as needed (for constipation)  Yes Torin Orr APRN - NP   pantoprazole (PROTONIX) 20 MG tablet Take 20 mg by mouth daily Yes Historical Provider, MD MARINONA PLUS 8.6-50 MG per tablet  Yes Historical Provider, MD   glipiZIDE (GLUCOTROL XL) 10 MG extended release tablet TAKE 1 TABLET BY MOUTH EVERY DAY Yes Historical Provider, MD   dexlansoprazole ([address] [place] [address]) 60 MG CPDR delayed release capsule Take 60 mg by mouth daily  Yes Historical Provider, MD   bumetanide (BUMEX) 1 MG tablet Take 1 mg by mouth daily Yes Historical Provider, MD   amiodarone (CORDARONE) 200 MG tablet TAKE 1 TABLET BY MOUTH DAILY Yes ELISABETH Villalobos   mirabegron (MYRBETRIQ) 25 MG TB24 Take 1 tablet by mouth daily Yes ELISABETH Cervantes - CNP   aspirin 81 MG EC tablet Take 1 tablet by mouth daily Yes Fabian Delacruz MD   atorvastatin (LIPITOR) 20 MG tablet Take 1 tablet by mouth daily Yes Fabian Delacruz MD   metoprolol succinate (TOPROL XL) 100 MG extended release tablet Take 1 tablet by mouth daily Yes Fabian Delacruz MD   NIFEdipine (ADALAT CC) 60 MG extended release tablet Take 1 tablet by mouth daily Yes Fabian Delacruz MD   ferrous sulfate (IRON 325) 325 (65 Fe) MG tablet Take 1 tablet by mouth 2 times daily (with meals) Yes Fabian Delacruz MD   folic acid (FOLVITE) 1 MG tablet Take 1 tablet by mouth daily Yes Fabian Delacruz MD   vitamin D (ERGOCALCIFEROL) 1.25 MG (08134 UT) CAPS capsule Take 1 capsule by mouth once a week Yes Fabian Delacruz MD   OXYGEN Inhale 2 L into the lungs continuous Yes Fabian Delacruz MD        Review of Systems   Constitutional: Negative for activity change, appetite change, chills, diaphoresis and fatigue. HENT: Negative for congestion, dental problem, drooling, ear discharge, postnasal drip and rhinorrhea. Eyes: Negative for visual disturbance. Respiratory: Positive for apnea and shortness of breath. Negative for cough, chest tightness and wheezing. Gastrointestinal: Negative for abdominal distention, abdominal pain and anal bleeding. Endocrine: Negative for cold intolerance, heat intolerance and polydipsia. Genitourinary: Negative for difficulty urinating, dysuria, enuresis and flank pain. Musculoskeletal: Negative for arthralgias, back pain and gait problem. Allergic/Immunologic: Negative for environmental allergies.    Neurological: Negative for dizziness, facial asymmetry, light-headedness and headaches. Vitals:    01/13/22 1041   Pulse: 62   Temp: 97.7 °F (36.5 °C)   SpO2: 99%     Physical Exam  Vitals reviewed. Constitutional:       Appearance: Normal appearance. HENT:      Head: Normocephalic and atraumatic. Nose: Nose normal.   Eyes:      Extraocular Movements: Extraocular movements intact. Conjunctiva/sclera: Conjunctivae normal.   Cardiovascular:      Rate and Rhythm: Normal rate and regular rhythm. Heart sounds: No murmur heard. No friction rub. Pulmonary:      Effort: Pulmonary effort is normal. No respiratory distress. Breath sounds: Normal breath sounds. No stridor. No wheezing, rhonchi or rales. Abdominal:      General: There is no distension. Palpations: There is no mass. Tenderness: There is no abdominal tenderness. There is no guarding or rebound. Musculoskeletal:      Cervical back: Normal range of motion and neck supple. Neurological:      Mental Status: He is alert and oriented to person, place, and time. This note was generated used a voice recognition software. Errors in voice recognition may have occurred. An electronic signature was used to authenticate this note.     --Eric Storey MD

## 2022-01-18 RX ORDER — AMIODARONE HYDROCHLORIDE 200 MG/1
200 TABLET ORAL DAILY
Qty: 30 TABLET | Refills: 3 | Status: SHIPPED | OUTPATIENT
Start: 2022-01-18 | End: 2022-05-18

## 2022-01-21 DIAGNOSIS — Z95.0 PACEMAKER: Primary | ICD-10-CM

## 2022-01-21 DIAGNOSIS — I49.5 SA NODE DYSFUNCTION (HCC): ICD-10-CM

## 2022-02-01 RX ORDER — METOPROLOL SUCCINATE 100 MG/1
100 TABLET, EXTENDED RELEASE ORAL DAILY
Qty: 30 TABLET | Refills: 3 | OUTPATIENT
Start: 2022-02-01

## 2022-02-01 NOTE — TELEPHONE ENCOUNTER
Gisselle Wilder has requested a refill on his medication. Last office visit : Visit date not found   Next office visit : Visit date not found   Last medication refill :3/29/2021 w/3rf        Requested Prescriptions     Pending Prescriptions Disp Refills    metoprolol succinate (TOPROL XL) 100 MG extended release tablet [Pharmacy Med Name: METOPROLOL ER SUCCINATE 100MG TABS] 30 tablet 3     Sig: TAKE 1 TABLET BY MOUTH DAILY       [facility] - DAVINA patient not seen in our clinic.

## 2022-02-16 ENCOUNTER — OFFICE VISIT (OUTPATIENT)
Dept: GASTROENTEROLOGY | Age: 82
End: 2022-02-16
Payer: MEDICARE

## 2022-02-16 VITALS
HEIGHT: 72 IN | DIASTOLIC BLOOD PRESSURE: 65 MMHG | HEART RATE: 59 BPM | OXYGEN SATURATION: 99 % | SYSTOLIC BLOOD PRESSURE: 115 MMHG | BODY MASS INDEX: 29.39 KG/M2 | WEIGHT: 217 LBS

## 2022-02-16 DIAGNOSIS — Z11.52 ENCOUNTER FOR SCREENING FOR COVID-19: Primary | ICD-10-CM

## 2022-02-16 DIAGNOSIS — K59.00 CONSTIPATION, UNSPECIFIED CONSTIPATION TYPE: ICD-10-CM

## 2022-02-16 DIAGNOSIS — K21.9 CHRONIC GERD: ICD-10-CM

## 2022-02-16 DIAGNOSIS — K31.89 GASTRIC NODULE: Primary | ICD-10-CM

## 2022-02-16 PROCEDURE — 4040F PNEUMOC VAC/ADMIN/RCVD: CPT | Performed by: NURSE PRACTITIONER

## 2022-02-16 PROCEDURE — G8482 FLU IMMUNIZE ORDER/ADMIN: HCPCS | Performed by: NURSE PRACTITIONER

## 2022-02-16 PROCEDURE — G8427 DOCREV CUR MEDS BY ELIG CLIN: HCPCS | Performed by: NURSE PRACTITIONER

## 2022-02-16 PROCEDURE — 99214 OFFICE O/P EST MOD 30 MIN: CPT | Performed by: NURSE PRACTITIONER

## 2022-02-16 PROCEDURE — 1123F ACP DISCUSS/DSCN MKR DOCD: CPT | Performed by: NURSE PRACTITIONER

## 2022-02-16 PROCEDURE — 1036F TOBACCO NON-USER: CPT | Performed by: NURSE PRACTITIONER

## 2022-02-16 PROCEDURE — G8417 CALC BMI ABV UP PARAM F/U: HCPCS | Performed by: NURSE PRACTITIONER

## 2022-02-16 ASSESSMENT — ENCOUNTER SYMPTOMS
CONSTIPATION: 1
TROUBLE SWALLOWING: 0
VOMITING: 0
NAUSEA: 0
DIARRHEA: 0
RECTAL PAIN: 0
BLOOD IN STOOL: 0
SHORTNESS OF BREATH: 0
ANAL BLEEDING: 0
CHOKING: 0
ABDOMINAL PAIN: 0
COUGH: 0
ABDOMINAL DISTENTION: 0

## 2022-02-16 NOTE — PROGRESS NOTES
clinic f/u 4-6 weeks with Ms. Sharon Centeno scheduled f/u appts with other MDs   - NO ASA/NSAIDs x 2 weeks    FINAL DIAGNOSIS:   A.  Small intestine, duodenal biopsy:   Benign duodenal mucosa with focal mild chronic nonspecific inflammation, negative for evidence of sprue. Jessica Corral, gastric nodule biopsies:   1.  Superficial fragments of benign fundic/body type gastric mucosa with minimal chronic inflammation. 2.  No Helicobacter pylori organisms identified by immunohistochemical stain. COMMENT:      The histopathologic changes present in specimen B would not   necessarily explain a nodular lesion.  It is not possible to definitively   exclude a more significant adjacent or underlying lesion.  Clinical   correlation is suggested.      All scope and pathology reports were reviewed and discussed with patient. All questions answered, pt verbalized understanding. Assessment:     1. Gastric nodule    2. Constipation, unspecified constipation type    3. Chronic GERD            Plan:   - Continue Trulance  - Continue Protonix   - Schedule EGD with EUS   Nothing to eat or drink after midnight. No driving for 24 hours after procedure. Bring a  to procedure. No aspirin, NSAIDs, fish oil 5 days before procedure. I have discussed the benefits, alternatives, and risks (including bleeding, perforation and death)  for pursuing Endoscopy (EGD/Colonscopy/EUS/ERCP) with the patient and they are willing to continue. We also discussed the need for anesthesia, IV access, proper dietary changes, medication changes if necessary, and need for bowel prep (if ordered) prior to their Endoscopic procedure. They are aware they must have someone accompany them to their scheduled procedure to drive them home - they agree to the above and are willing to continue. Orders  No orders of the defined types were placed in this encounter.     Medications  No orders of the defined types were placed in this encounter. Patient History:     Past Medical History:   Diagnosis Date    Acute liver failure without hepatic coma 10/23/2018    Back pain     \"with tired legs as a result\"    Bladder cancer ([facility] 12/19/2018    Blood circulation, collateral     Carotid arterial disease (HCC)     recent surgery    CKD (chronic kidney disease), 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/2018    Constipation     COPD with acute lower respiratory infection ([facility] 02/24/2021    Epigastric discomfort     GERD (gastroesophageal reflux disease)     Hyperlipidemia     Hypertension     Hypertension     Palliative care patient 10/23/2018    Pneumonia due to infectious organism 11/06/2018    Primary osteoarthritis of left knee 10/14/2018    PVD (peripheral vascular disease) (HCC)     Tremor     Tremor on Right side x 1-2 weeks per stepdaughter    Type 2 diabetes mellitus with complication, without long-term current use of insulin ([facility] 01/21/2021       Past Surgical History:   Procedure Laterality Date    BACK SURGERY      CARDIAC CATHETERIZATION  03/23/2021    100% RCA    COLONOSCOPY  2007?     CYSTOSCOPY Bilateral 12/19/2018    CYSTOSCOPY, BIOSPY FULGURATION OF BLADDER TUMOR POSSIBLE TURBT, RETROGRADE PYELOGRAM performed by Zoë Johnson MD at [facility] COLONOSCOPY FLX DX W/COLLJ SPEC WHEN PFRMD N/A 09/11/2017    Dr Julianna Cannon internal hemorrhoids, diverticular disease-HP-No recall (age)   [place] REVISE MEDIAN N/CARPAL TUNNEL SURG Left 07/18/2018    OPEN CARPAL TUNNEL RELEASE performed by Miguel Dee MD at [address] Left 08/28/2018    LEFT CAROTID ENDARTERECTOMY WITH VEIN PATCH ANGIOPLASTY AND COMPLETION ANGIOGRAM performed by Pernell Oppenheim, MD at [facility] Left 10/15/2018    LEFT COMPLEX TOTAL KNEE ARTHROPLASTY performed by Miguel Dee MD at [address] 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 Aortogram.Diagnostic right lower extremity arteriogram.Radiologic supervision and interpretation.  VASCULAR SURGERY  01/13/2015    Pamela Schmidt M.D Atherectomy,angioplasty,and stenting of left superficial femoral artery.  VASCULAR SURGERY  03/11/2014    Pamela Schmidt M.D. Ultrasound-guided access of right common femoral artery. Aortogram.Left lower extremity arteriogram.Atherectomy and angioplasty of left superficial femoral artery. Radiologic supervision and interpretation.  VASCULAR SURGERY  01/18/2013    Pamela BESS Aortogram.Multistation arteriogram right lower extremity. Laser atherectomy and angioplasty of right superficial femoral artery. Selective catheterization of right tibioperoneal trunk. Angioplasty of peroneal artery and tibioperoneal trunk.  VASCULAR SURGERY  10/30/2018    VIRGINIE. Ultrasound guided cannulation of right internal vein. Placement of right internal jugular vein tunneled dialysis catheter bard equistream xk 23cm tip to cuff    VASCULAR SURGERY  12/17/2018    SJS. Removal of tunneled dilaysis catheter right internal jugular vein.        Family History   Problem Relation Age of Onset    Colon Cancer Father     Diabetes Brother     Colon Polyps Neg Hx     Liver Cancer Neg Hx     Liver Disease Neg Hx     Esophageal Cancer Neg Hx     Rectal Cancer Neg Hx     Stomach Cancer Neg Hx        Social History     Socioeconomic History    Marital status:      Spouse name: None    Number of children: None    Years of education: None    Highest education level: None   Occupational History    None   Tobacco Use    Smoking status: Former Smoker     Packs/day: 2.00     Years: 48.00     Pack years: 96.00     Types: Cigarettes     Quit date: 6/3/2003     Years since quittin.7    Smokeless tobacco: Never Used   Vaping Use    Vaping Use: Never used   Substance and Sexual Activity    Alcohol use: Yes     Alcohol/week: 12.0 standard drinks     Types: 12 Glasses of wine per week     Comment: 2 glasses of wine every night    Drug use: No    Sexual activity: Yes     Partners: Female   Other Topics Concern    None   Social History Narrative    None     Social Determinants of Health     Financial Resource Strain:     Difficulty of Paying Living Expenses: Not on file   Food Insecurity:     Worried About Running Out of Food in the Last Year: Not on file    Ankit of Food in the Last Year: Not on file   Transportation Needs:     Lack of Transportation (Medical): Not on file    Lack of Transportation (Non-Medical):  Not on file   Physical Activity:     Days of Exercise per Week: Not on file    Minutes of Exercise per Session: Not on file   Stress:     Feeling of Stress : Not on file   Social Connections:     Frequency of Communication with Friends and Family: Not on file    Frequency of Social Gatherings with Friends and Family: Not on file    Attends [employer]ism Services: Not on file    Active Member of Clubs or Organizations: Not on file    Attends Club or Organization Meetings: Not on file    Marital Status: Not on file   Intimate Partner Violence:     Fear of Current or Ex-Partner: Not on file    Emotionally Abused: Not on file    Physically Abused: Not on file    Sexually Abused: Not on file   Housing Stability:     Unable to Pay for Housing in the Last Year: Not on file    Number of Places Lived in the Last Year: Not on file    Unstable Housing in the Last Year: Not on file       Current Outpatient Medications   Medication Sig Dispense Refill    amiodarone (CORDARONE) 200 MG tablet TAKE 1 TABLET BY MOUTH DAILY 30 tablet 3    tamsulosin (FLOMAX) 0.4 MG capsule Take 2 capsules by mouth daily 60 capsule 11    Plecanatide (TRULANCE) 3 MG TABS Take 3 mg by mouth daily (Patient taking differently: Take 3 mg by mouth as needed (for constipation) ) 30 tablet 3    pantoprazole (PROTONIX) 20 MG tablet Take 20 mg by mouth daily      SENNA PLUS 8.6-50 MG per tablet       glipiZIDE (GLUCOTROL XL) 10 MG extended release tablet TAKE 1 TABLET BY MOUTH EVERY DAY      bumetanide (BUMEX) 1 MG tablet Take 1 mg by mouth daily      mirabegron (MYRBETRIQ) 25 MG TB24 Take 1 tablet by mouth daily 90 tablet 3    aspirin 81 MG EC tablet Take 1 tablet by mouth daily 30 tablet 3    atorvastatin (LIPITOR) 20 MG tablet Take 1 tablet by mouth daily 30 tablet 0    metoprolol succinate (TOPROL XL) 100 MG extended release tablet Take 1 tablet by mouth daily 30 tablet 3    NIFEdipine (ADALAT CC) 60 MG extended release tablet Take 1 tablet by mouth daily 30 tablet 3    ferrous sulfate (IRON 325) 325 (65 Fe) MG tablet Take 1 tablet by mouth 2 times daily (with meals) 30 tablet 3    folic acid (FOLVITE) 1 MG tablet Take 1 tablet by mouth daily 30 tablet 3    vitamin D (ERGOCALCIFEROL) 1.25 MG (14768 UT) CAPS capsule Take 1 capsule by mouth once a week 5 capsule 0    OXYGEN Inhale 2 L into the lungs continuous 1 Container 5    UNABLE TO FIND Take 20 mg by mouth daily trosopium       No current facility-administered medications for this visit. Allergies   Allergen Reactions    Eliquis [Apixaban] Other (See Comments)     \"almost bled to death\"    Promethazine Hcl Other (See Comments)     He became encephalopathic for several hours and was not responsive.        Review of Systems   Constitutional: Negative for activity change, appetite change, fatigue, fever and unexpected weight change. HENT: Negative for trouble swallowing. Respiratory: Negative for cough, choking and shortness of breath. Cardiovascular: Negative for chest pain. Gastrointestinal: Positive for constipation (controlled). Negative for abdominal distention, abdominal pain, anal bleeding, blood in stool, diarrhea, nausea, rectal pain and vomiting. Reflux - controlled   Musculoskeletal: Positive for gait problem. Allergic/Immunologic: Negative for food allergies. All other systems reviewed and are negative. Objective:     /65 (Site: Left Upper Arm)   Pulse 59   Ht 6' (1.829 m)   Wt 217 lb (98.4 kg)   SpO2 99%   BMI 29.43 kg/m²     Physical Exam  Vitals reviewed. Constitutional:       General: He is not in acute distress. Appearance: He is well-developed. HENT:      Nose:      Comments: Mask on     Mouth/Throat:      Comments: Mask on  Eyes:      General:         Right eye: No discharge. Left eye: No discharge. Cardiovascular:      Rate and Rhythm: Normal rate and regular rhythm. Heart sounds: No murmur heard. Pulmonary:      Effort: Pulmonary effort is normal. No respiratory distress. Breath sounds: Normal breath sounds. Abdominal:      General: Bowel sounds are normal. There is no distension. Palpations: Abdomen is soft. There is no mass. Tenderness: There is no abdominal tenderness. There is no guarding or rebound. Musculoskeletal:         General: Normal range of motion. Cervical back: Normal range of motion. Right lower leg: Edema present. Left lower leg: Edema present. Skin:     General: Skin is warm and dry. Neurological:      Mental Status: He is alert and oriented to person, place, and time. Gait: Gait abnormal (in wheelchair).    Psychiatric:         Behavior: Behavior normal.

## 2022-03-04 DIAGNOSIS — Z11.52 ENCOUNTER FOR SCREENING FOR COVID-19: ICD-10-CM

## 2022-03-04 LAB — SARS-COV-2, PCR: NOT DETECTED

## 2022-03-07 ENCOUNTER — ANESTHESIA (OUTPATIENT)
Dept: ENDOSCOPY | Age: 82
End: 2022-03-07
Payer: MEDICARE

## 2022-03-07 ENCOUNTER — ANESTHESIA EVENT (OUTPATIENT)
Dept: ENDOSCOPY | Age: 82
End: 2022-03-07
Payer: MEDICARE

## 2022-03-07 ENCOUNTER — HOSPITAL ENCOUNTER (OUTPATIENT)
Age: 82
Setting detail: OUTPATIENT SURGERY
Discharge: HOME | End: 2022-03-07
Attending: INTERNAL MEDICINE | Admitting: INTERNAL MEDICINE
Payer: MEDICARE

## 2022-03-07 VITALS
HEART RATE: 60 BPM | RESPIRATION RATE: 16 BRPM | DIASTOLIC BLOOD PRESSURE: 62 MMHG | TEMPERATURE: 97 F | BODY MASS INDEX: 29.39 KG/M2 | WEIGHT: 217 LBS | SYSTOLIC BLOOD PRESSURE: 123 MMHG | OXYGEN SATURATION: 97 % | HEIGHT: 72 IN

## 2022-03-07 VITALS
DIASTOLIC BLOOD PRESSURE: 58 MMHG | RESPIRATION RATE: 12 BRPM | TEMPERATURE: 97 F | SYSTOLIC BLOOD PRESSURE: 119 MMHG | OXYGEN SATURATION: 99 %

## 2022-03-07 LAB
GLUCOSE BLD-MCNC: 85 MG/DL (ref 70–99)
PERFORMED ON: NORMAL

## 2022-03-07 PROCEDURE — 3609018500 HC EGD US SCOPE W/ADJACENT STRUCTURES: Performed by: INTERNAL MEDICINE

## 2022-03-07 PROCEDURE — 82947 ASSAY GLUCOSE BLOOD QUANT: CPT

## 2022-03-07 PROCEDURE — 2709999900 HC NON-CHARGEABLE SUPPLY: Performed by: INTERNAL MEDICINE

## 2022-03-07 PROCEDURE — 3700000000 HC ANESTHESIA ATTENDED CARE: Performed by: INTERNAL MEDICINE

## 2022-03-07 PROCEDURE — 3700000001 HC ADD 15 MINUTES (ANESTHESIA): Performed by: INTERNAL MEDICINE

## 2022-03-07 PROCEDURE — 7100000011 HC PHASE II RECOVERY - ADDTL 15 MIN: Performed by: INTERNAL MEDICINE

## 2022-03-07 PROCEDURE — 2580000003 HC RX 258: Performed by: INTERNAL MEDICINE

## 2022-03-07 PROCEDURE — 3609012400 HC EGD TRANSORAL BIOPSY SINGLE/MULTIPLE: Performed by: INTERNAL MEDICINE

## 2022-03-07 PROCEDURE — 43237 ENDOSCOPIC US EXAM ESOPH: CPT | Performed by: INTERNAL MEDICINE

## 2022-03-07 PROCEDURE — 7100000010 HC PHASE II RECOVERY - FIRST 15 MIN: Performed by: INTERNAL MEDICINE

## 2022-03-07 RX ORDER — SODIUM CHLORIDE, SODIUM LACTATE, POTASSIUM CHLORIDE, CALCIUM CHLORIDE 600; 310; 30; 20 MG/100ML; MG/100ML; MG/100ML; MG/100ML
INJECTION, SOLUTION INTRAVENOUS CONTINUOUS
Status: DISCONTINUED | OUTPATIENT
Start: 2022-03-07 | End: 2022-03-07 | Stop reason: HOSPADM

## 2022-03-07 RX ADMIN — SODIUM CHLORIDE, POTASSIUM CHLORIDE, SODIUM LACTATE AND CALCIUM CHLORIDE: 600; 310; 30; 20 INJECTION, SOLUTION INTRAVENOUS at 10:21

## 2022-03-07 ASSESSMENT — ENCOUNTER SYMPTOMS: SHORTNESS OF BREATH: 1

## 2022-03-07 ASSESSMENT — PAIN SCALES - GENERAL
PAINLEVEL_OUTOF10: 0
PAINLEVEL_OUTOF10: 0

## 2022-03-07 ASSESSMENT — PAIN - FUNCTIONAL ASSESSMENT: PAIN_FUNCTIONAL_ASSESSMENT: 0-10

## 2022-03-07 NOTE — H&P
Patient Name: Latanya Soria  : 1940  MRN: [id number]  DATE: 22    Allergies: Allergies   Allergen Reactions    Eliquis [Apixaban] Other (See Comments)     \"almost bled to death\"    Promethazine Hcl Other (See Comments)     He became encephalopathic for several hours and was not responsive. ENDOSCOPY  History and Physical    Procedure:    [] Diagnostic Colonoscopy       [] Screening Colonoscopy  [x] EGD      [] ERCP      [x] EUS       [] Other    [x] Previous office notes/History and Physical reviewed from the patients chart. Please see EMR for further details of HPI. I have examined the patient's status immediately prior to the procedure and:      Indications/HPI:   1. Gastric nodule        2.  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  After pushing against this nodule with the closed tip of a biopsy forceps confirming its firm nature, cold biopsies were taken from the surface mucosa.  Otherwise remainder of the examined stomach appeared essentially normal.     []Abdominal Pain   []Cancer- GI/Lung     []Fhx of colon CA/polyps  []History of Polyps  []Barretts            []Melena  []Abnormal Imaging              []Dysphagia              []Persistent Pneumonia   []Anemia                            []Food Impaction        []History of Polyps  [] GI Bleed             []Pulmonary nodule/Mass   []Change in bowel habits []Heartburn/Reflux  []Rectal Bleed (BRBPR)  []Chest Pain - Non Cardiac []Heme (+) Stool []Ulcers  []Constipation  []Hemoptysis  []Varices  []Diarrhea  []Hypoxemia    []Nausea/Vomiting   []Screening   []Crohns/Colitis  []Other:     Anesthesia:   [x] MAC [] Moderate Sedation   [] General   [] None     ROS: 12 pt Review of Symptoms was negative unless mentioned above    Medications:   Prior to Admission medications    Medication Sig Start Date End Date Taking?  Authorizing Provider   amiodarone (CORDARONE) 200 MG tablet TAKE 1 TABLET BY MOUTH DAILY 1/18/22  Yes ELISABETH Ortiz   UNABLE TO FIND Take 20 mg by mouth daily trosopium   Yes Historical Provider, MD   tamsulosin (FLOMAX) 0.4 MG capsule Take 2 capsules by mouth daily 12/13/21  Yes Kami Weeks MD   pantoprazole (PROTONIX) 20 MG tablet Take 20 mg by mouth daily   Yes Historical Provider, MD   SENNA PLUS 8.6-50 MG per tablet  10/26/21  Yes Historical Provider, MD   glipiZIDE (GLUCOTROL XL) 10 MG extended release tablet TAKE 1 TABLET BY MOUTH EVERY DAY 10/19/21  Yes Historical Provider, MD   bumetanide (BUMEX) 1 MG tablet Take 1 mg by mouth daily   Yes Historical Provider, MD   mirabegron (MYRBETRIQ) 25 MG TB24 Take 1 tablet by mouth daily 8/16/21  Yes Peggy Lewis APRN - CNP   atorvastatin (LIPITOR) 20 MG tablet Take 1 tablet by mouth daily 3/29/21  Yes Haris Shields MD   metoprolol succinate (TOPROL XL) 100 MG extended release tablet Take 1 tablet by mouth daily 3/29/21  Yes Haris Shields MD   NIFEdipine (ADALAT CC) 60 MG extended release tablet Take 1 tablet by mouth daily 3/29/21  Yes Haris Shields MD   ferrous sulfate (IRON 325) 325 (65 Fe) MG tablet Take 1 tablet by mouth 2 times daily (with meals) 3/29/21  Yes Haris Shields MD   folic acid (FOLVITE) 1 MG tablet Take 1 tablet by mouth daily 3/29/21  Yes Haris Shields MD   Plecanatide (TRULANCE) 3 MG TABS Take 3 mg by mouth daily  Patient taking differently: Take 3 mg by mouth as needed (for constipation)  12/2/21   ELISABETH Jones - NP   aspirin 81 MG EC tablet Take 1 tablet by mouth daily 3/29/21   Haris Shields MD   vitamin D (ERGOCALCIFEROL) 1.25 MG (02515 UT) CAPS capsule Take 1 capsule by mouth once a week 3/29/21   Haris Shields MD   OXYGEN Inhale 2 L into the lungs continuous 3/29/21   Haris Shields MD       Past Medical History:  Past Medical History:   Diagnosis Date    Acute liver failure without hepatic coma 10/23/2018    Back pain     \"with tired legs as a result\"    Bladder cancer ([facility] 12/19/2018    Blood circulation, collateral     Carotid arterial disease (HCC)     recent surgery    CKD (chronic kidney disease), 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/2018    Constipation     COPD with acute lower respiratory infection ([facility] 02/24/2021    Epigastric discomfort     GERD (gastroesophageal reflux disease)     Hyperlipidemia     Hypertension     Hypertension     Pacemaker     Palliative care patient 10/23/2018    Pneumonia due to infectious organism 11/06/2018    Primary osteoarthritis of left knee 10/14/2018    PVD (peripheral vascular disease) (HCC)     Tremor     Tremor on Right side x 1-2 weeks per stepdaughter    Type 2 diabetes mellitus with complication, without long-term current use of insulin ([facility] [facility] 01/21/2021       Past Surgical History:  Past Surgical History:   Procedure Laterality Date    BACK SURGERY      CARDIAC CATHETERIZATION  03/23/2021    100% RCA    COLONOSCOPY  2007?     CYSTOSCOPY Bilateral 12/19/2018    CYSTOSCOPY, BIOSPY FULGURATION OF BLADDER TUMOR POSSIBLE TURBT, RETROGRADE PYELOGRAM performed by Kartik Gong MD at [address] COLONOSCOPY FLX DX W/COLLJ SPEC WHEN PFRMD N/A 09/11/2017    Dr Radha Cruz internal hemorrhoids, diverticular disease-HP-No recall (age)   [employer] REVISE MEDIAN N/CARPAL TUNNEL SURG Left 07/18/2018    OPEN CARPAL TUNNEL RELEASE performed by Enriqueta Parra MD at [address] Left 08/28/2018    LEFT CAROTID ENDARTERECTOMY WITH VEIN PATCH ANGIOPLASTY AND COMPLETION ANGIOGRAM performed by Deb Hernandez MD at [address] [facility]. Left 10/15/2018    LEFT COMPLEX TOTAL KNEE ARTHROPLASTY performed by Enriqueta Parra MD at [facility] ENDOSCOPY N/A 11/18/2021    Dr Page Rather, Sub prep lg amount of solid and semisolid food/Medication in stomach body/antrum/pylorus, stomach lumen appeared to distended w air insufflation and collapse upon suction,   inadequate exam sugg of gastroparesis, repeat in 10-14 days    UPPER GASTROINTESTINAL ENDOSCOPY N/A 2022    Dr Adriana Farris, distal stomach body likely GIST nodule-Submucosal fundic body gastric mucosa, Benign, (-)Hpylori, (-)Sprue    VASCULAR SURGERY  2015    Bre BESS Ultrasound guided access of left common femoral artery. Aortogram.Diagnostic right lower extremity arteriogram.Radiologic supervision and interpretation.  VASCULAR SURGERY  2015    Bre Gutierrez M.D Atherectomy,angioplasty,and stenting of left superficial femoral artery.  VASCULAR SURGERY  2014    Bre Gutierrez M.D. Ultrasound-guided access of right common femoral artery. Aortogram.Left lower extremity arteriogram.Atherectomy and angioplasty of left superficial femoral artery. Radiologic supervision and interpretation.  VASCULAR SURGERY  2013    Bre BESS Aortogram.Multistation arteriogram right lower extremity. Laser atherectomy and angioplasty of right superficial femoral artery. Selective catheterization of right tibioperoneal trunk. Angioplasty of peroneal artery and tibioperoneal trunk.  VASCULAR SURGERY  10/30/2018    [facility]. Ultrasound guided cannulation of right internal vein. Placement of right internal jugular vein tunneled dialysis catheter bard equistream xk 23cm tip to cuff    VASCULAR SURGERY  2018    [facility]. Removal of tunneled dilaysis catheter right internal jugular vein. Social History:  Social History     Tobacco Use    Smoking status: Former Smoker     Packs/day: 2.00     Years: 48.00     Pack years: 96.00     Types: Cigarettes     Quit date: 6/3/2003     Years since quittin.7    Smokeless tobacco: Never Used   Vaping Use    Vaping Use: Never used   Substance Use Topics    Alcohol use:  Yes     Alcohol/week: 12.0 standard drinks     Types: 12 Glasses of wine per week     Comment: 1 glasses of wine every night    Drug use: No       Vital Signs:   Vitals:    03/07/22 0951   BP: 129/63   Pulse: 60   Resp: 20   Temp: 98.3 °F (36.8 °C)   SpO2: 98%        Physical Exam:  Cardiac:  [x]WNL  []Comments:  Pulmonary:  [x]WNL   []Comments:  Neuro/Mental Status:  [x]WNL  []Comments:  Abdominal:  [x]WNL    []Comments:  Other:   []WNL  []Comments:    Informed Consent:  The risks and benefits of the procedure have been discussed with either the patient or if they cannot consent, their representative. Assessment:  Patient examined and appropriate for planned sedation and procedure. Plan:  Proceed with planned sedation and procedure as above.          Fabi Powers MD

## 2022-03-07 NOTE — ANESTHESIA POSTPROCEDURE EVALUATION
Department of Anesthesiology  Postprocedure Note    Patient: Shannon Butler  MRN: [id number]  YOB: 1940  Date of evaluation: 3/7/2022  Time:  11:57 AM     Procedure Summary     Date: 03/07/22 Room / Location: [address]    Anesthesia Start: 1119 Anesthesia Stop: 1157    Procedures:       EGD BIOPSY (N/A Abdomen)      EGD ESOPHAGOGASTRODUODENOSCOPY ULTRASOUND (N/A ) Diagnosis: (GASTRIC NODULE)    Surgeons: Kate Hernandez MD Responsible Provider: ELISABETH Dexter CRNA    Anesthesia Type: general ASA Status: 4          Anesthesia Type: general    Cristy Phase I: Cristy Score: 10    Cristy Phase II:      Last vitals: Reviewed and per EMR flowsheets.        Anesthesia Post Evaluation    Patient location during evaluation: PACU  Patient participation: complete - patient participated  Level of consciousness: sleepy but conscious  Pain score: 0  Airway patency: patent  Nausea & Vomiting: no nausea and no vomiting  Complications: no  Cardiovascular status: hemodynamically stable  Respiratory status: acceptable and room air  Hydration status: euvolemic

## 2022-03-07 NOTE — OP NOTE
Referring/Primary Care Provider: Tiffanie Leiva MD,     Date of Procedure: 03/07/22    Endoscopist & Endosonographer: Sarah Curran MD, PhD    Procedure:   1. EGD up to the pylorus of stomach with Endoscopic Ultrasound of a gastric nodule    Indications:   1. Gastric nodule          2. 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  After pushing against this nodule with the closed tip of a biopsy forceps confirming its firm nature, cold biopsies were taken from the surface mucosa.  Otherwise remainder of the examined stomach appeared essentially normal.       Anesthesia:  Sedation was administered by anesthesia who monitored the patient during the procedure. Procedure:   After reviewing the patient's chart, H&P, medications, obtaining informed consent, and discussing risks benefits and alternatives to the procedure the patient was placed in the left lateral decubitus position. A oblique viewing Olympus radial EUS scope was lubricated and inserted through the mouth into the oropharynx. Under indirect visualization, the upper esophagus was intubated. The scope was advanced to the level of the pylorus only with limited views of the esophageal mucosa. Findings and maneuvers are listed in impression below. The patient tolerated the procedure well. There were no immediate complications. Findings:   Endoscopic Finding:   Esophagus: normal with a normal EG junction at 42 cm. There is no obvious hiatal hernia present. Stomach:  abnormal: Sessile 1 to 1.5 cm in diameter nodular area was noted at the end of a rugal fold in the distal body of the stomach along the lesser curvature as was seen on the previous EGD exam few weeks ago.   No significant increase in size of this area during this interval.    NO ulcers or masses or gastric outlet obstruction or retained food or fluid. Rugae were normal and lumen distended well with insufflation. Retroflexed views otherwise revealed a normal GE junction, fundus and cardia as well. Duodenum: Not examined on this endoscopy    Endosonographic Findings:  - The celiac axis and associated vascular structures was identified and examined. No concerning or malignant lymphadenopathy was identified.     - Limited views of the left lobe of the liver revealed no obvious solid or cystic lesions    - The EUS scope was advanced to the antrum and pylorus of the stomach. After instillation of 100 ml of bubble free water, examination of the stomach was undertaken including the area of the small oval-shaped gastric nodule which appeared to be involving only the mucosa and submucosa and approximately 5 mm X 15 mm in size, benign-appearing, homogeneous and isoechoic. The muscularis propria in this area appeared essentially normal.  The gastric nodule may be representing mild thickening of the gastric mucosa and submucosa in this area based on its EUS appearance. Previous biopsies few weeks ago had revealed benign findings pertaining to the mucosa. Hence no further attempts at biopsy or FNA were done today. - Pancreas: The body of the pancreas and the main pancreatic duct appeared essentially normal the pancreatic duct was 2.1 mm in diameter in the body. Estimated Blood Loss: minimal    IMPRESSION:  Submucosal benign-appearing gastric nodule involving the mucosa and submucosa. Otherwise normal upper endoscopy up to the distal stomach.   Normal body of the pancreas and main pancreatic duct    RECOMMENDATIONS:     - Resume previous meds and a high fiber diet  - GI clinic f/u PRN; will repeat EGD with EUS in 1 year to check for any change in the size of the small benign-appearing gastric nodule noted today; sooner if the patient has any new upper GI symptoms including nausea or vomiting or signs of gastric outlet obstruction.  - Keep scheduled f/u appts with other MDs     - NO ASA/NSAIDs x 2 weeks    The results were discussed with the patient and family. A copy of the images obtained were given to the patient.      Julianna Padro MD, MD  03/07/22  10:19 AM

## 2022-03-07 NOTE — ANESTHESIA PRE PROCEDURE
Department of Anesthesiology  Preprocedure Note       Name:  Latanya Soria   Age:  [de-identified] y.o.  :  1940                                          MRN:  [id number]         Date:  3/7/2022      Surgeon: Guerrero Durán):  Kim Linares MD    Procedure: Procedure(s):  EGD BIOPSY  EGD ESOPHAGOGASTRODUODENOSCOPY ULTRASOUND    Medications prior to admission:   Prior to Admission medications    Medication Sig Start Date End Date Taking?  Authorizing Provider   amiodarone (CORDARONE) 200 MG tablet TAKE 1 TABLET BY MOUTH DAILY 22   ELISABETH Monzon   UNABLE TO FIND Take 20 mg by mouth daily trosopium    Historical Provider, MD   tamsulosin [employer]) 0.4 MG capsule Take 2 capsules by mouth daily 21   Supa Chacon MD   Plecanatide (TRULANCE) 3 MG TABS Take 3 mg by mouth daily  Patient taking differently: Take 3 mg by mouth as needed (for constipation)  21   ELISABETH Lowe - NP   pantoprazole (PROTONIX) 20 MG tablet Take 20 mg by mouth daily    Historical Provider, MD   SENNA PLUS 8.6-50 MG per tablet  10/26/21   Historical Provider, MD   glipiZIDE (GLUCOTROL XL) 10 MG extended release tablet TAKE 1 TABLET BY MOUTH EVERY DAY 10/19/21   Historical Provider, MD   bumetanide (BUMEX) 1 MG tablet Take 1 mg by mouth daily    Historical Provider, MD   mirabegron (MYRBETRIQ) 25 MG TB24 Take 1 tablet by mouth daily 21   Jasper Chau APRN - CNP   aspirin 81 MG EC tablet Take 1 tablet by mouth daily 3/29/21   Misty Rodriguez MD   atorvastatin (LIPITOR) 20 MG tablet Take 1 tablet by mouth daily 3/29/21   Misty Rodriguez MD   metoprolol succinate (TOPROL XL) 100 MG extended release tablet Take 1 tablet by mouth daily 3/29/21   Misty Rodriguez MD   NIFEdipine (ADALAT CC) 60 MG extended release tablet Take 1 tablet by mouth daily 3/29/21   Misty Rodriguez MD   ferrous sulfate (IRON 325) 325 (65 Fe) MG tablet Take 1 tablet by mouth 2 times daily (with meals) 3/29/21   Misty Rodriguez MD   folic acid (FOLVITE) 1 MG tablet Take 1 tablet by mouth daily 3/29/21   Isabell Rodriguez MD   vitamin D (ERGOCALCIFEROL) 1.25 MG (43730 UT) CAPS capsule Take 1 capsule by mouth once a week 3/29/21   Isabell Rodriguez MD   OXYGEN Inhale 2 L into the lungs continuous 3/29/21   Isabell Rodriguez MD       Current medications:    No current facility-administered medications for this visit. No current outpatient medications on file. Facility-Administered Medications Ordered in Other Visits   Medication Dose Route Frequency Provider Last Rate Last Admin    lactated ringers infusion   IntraVENous Continuous Viktoria Rider  mL/hr at 03/07/22 1021 New Bag at 03/07/22 1021       Allergies: Allergies   Allergen Reactions    Eliquis [Apixaban] Other (See Comments)     \"almost bled to death\"    Promethazine Hcl Other (See Comments)     He became encephalopathic for several hours and was not responsive.        Problem List:    Patient Active Problem List   Diagnosis Code    Diverticulitis of large intestine with perforation without bleeding K57.20    Generalized abdominal pain R10.84    Colonic diverticular abscess K57.20    Bilateral carotid artery stenosis I65.23    Atherosclerosis of native artery of both lower extremities with intermittent claudication ([facility]) I70.213    Carotid stenosis, asymptomatic, left I65.22    Primary osteoarthritis of left knee M17.12    Arthritis of knee M17.10    Essential hypertension I10    Pure hypercholesterolemia E78.00    Slow transit constipation K59.01    Iron deficiency anemia D50.9    GERD (gastroesophageal reflux disease) K21.9    Acute liver failure without hepatic coma K72.00    CHF (congestive heart failure) ([facility]) I50.9    Bilateral pleural effusion J90    Palliative care patient Z51.5    Shock liver K72.00    Acute renal failure ([address] N17.9    BPH associated with nocturia N40.1, R35.1    Bleeding diathesis ([address] D69.9    Epistaxis R04.0    Acute blood loss anemia D62    Melena K92.1    Thrombocytopenia (HCC) D69.6    Hypocalcemia E83.51    Pneumonia due to infectious organism J18.9    Bladder cancer (HCC) C67.9    Postoperative pain G89.18    History of bladder cancer Z85.51    Severe sepsis (HCC) A41.9, R65.20    Chronic respiratory failure with hypoxia and hypercapnia ([facility]) J96.11, J96.12    Acute on chronic kidney failure (HCC) N17.9, N18.9    Hypoxemia B40.17    Metabolic acidosis N84.6    Acidosis, metabolic, with respiratory acidosis E87.4    Acute respiratory failure (HCC) J96.00    Type 2 diabetes mellitus with complication, without long-term current use of insulin ([facility]) E11.8    Weakness R53.1    Other dysphagia R13.19    Chronic midline low back pain without sciatica M54.50, G89.29    COPD with acute lower respiratory infection ([facility]) J44.0    Chronic kidney disease N18.9    Recurrent pneumonia J18.9    Nonsustained ventricular tachycardia ([facility]) I47.2    Atrial fibrillation ([facility]) I48.91    Vitamin D deficiency E55.9    Anemia D64.9    Alcohol use Z72.89    Pacemaker Z95.0    Class 1 obesity in adult E66.9    GREGORIO treated with BiPAP G47.33    Chronic diastolic congestive heart failure (HCC) I50.32    SOB (shortness of breath) R06.02    Hypoglycemia E16.2    Acute kidney injury superimposed on CKD (HCC) N17.9, N18.9    Constipation K59.00    Acute decompensated heart failure ([facility]) I50.9    Chronic obstructive pulmonary disease with (acute) exacerbation ([facility]) J44.1       Past Medical History:        Diagnosis Date    Acute liver failure without hepatic coma 10/23/2018    Back pain     \"with tired legs as a result\"    Bladder cancer ([address] 12/19/2018    Blood circulation, collateral     Carotid arterial disease ([address]     recent surgery    CKD (chronic kidney disease), 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/2018    Constipation     COPD with acute lower respiratory infection ([address] 02/24/2021    Epigastric discomfort     GERD (gastroesophageal reflux disease)     Hyperlipidemia     Hypertension     Hypertension     Pacemaker     Palliative care patient 10/23/2018    Pneumonia due to infectious organism 11/06/2018    Primary osteoarthritis of left knee 10/14/2018    PVD (peripheral vascular disease) (HCC)     Tremor     Tremor on Right side x 1-2 weeks per stepdaughter    Type 2 diabetes mellitus with complication, without long-term current use of insulin ([facility] 01/21/2021       Past Surgical History:        Procedure Laterality Date    BACK SURGERY      CARDIAC CATHETERIZATION  03/23/2021    100% RCA    COLONOSCOPY  2007?     CYSTOSCOPY Bilateral 12/19/2018    CYSTOSCOPY, BIOSPY FULGURATION OF BLADDER TUMOR POSSIBLE TURBT, RETROGRADE PYELOGRAM performed by Minnie Orozco MD at [address] COLONOSCOPY FLX DX W/COLLJ SPEC WHEN PFRMD N/A 09/11/2017    Dr Tab Loo internal hemorrhoids, diverticular disease-HP-No recall (age)   [facility] [place] REVISE MEDIAN N/CARPAL TUNNEL SURG Left 07/18/2018    OPEN CARPAL TUNNEL RELEASE performed by Adan Grewal MD at [address] Left 08/28/2018    LEFT CAROTID ENDARTERECTOMY WITH VEIN PATCH ANGIOPLASTY AND COMPLETION ANGIOGRAM performed by Nancy Sheriff MD at [address] [address] Left 10/15/2018    LEFT COMPLEX TOTAL KNEE ARTHROPLASTY performed by Adan Grewal MD at [address] [facility] ENDOSCOPY N/A 11/18/2021    Dr Kiersten Goncalves, Sub prep lg amount of solid and semisolid food/Medication in stomach body/antrum/pylorus, stomach lumen appeared to distended w air insufflation and collapse upon suction,   inadequate exam sugg of gastroparesis, repeat in 10-14 days    UPPER GASTROINTESTINAL ENDOSCOPY N/A 01/06/2022    Dr Kiersten Goncalves, distal stomach body likely GIST nodule-Submucosal fundic body gastric mucosa, Benign, (-)Hpylori, (-)Sprue    VASCULAR SURGERY  04/21/2015    Kalyani Shoulder M. D.Ultrasound guided access of left common femoral artery. Aortogram.Diagnostic right lower extremity arteriogram.Radiologic supervision and interpretation.  VASCULAR SURGERY  2015    Bre Gutierrez M.D Atherectomy,angioplasty,and stenting of left superficial femoral artery.  VASCULAR SURGERY  2014    Bre Gutierrez M.D. Ultrasound-guided access of right common femoral artery. Aortogram.Left lower extremity arteriogram.Atherectomy and angioplasty of left superficial femoral artery. Radiologic supervision and interpretation.  VASCULAR SURGERY  2013    Bre BESS Aortogram.Multistation arteriogram right lower extremity. Laser atherectomy and angioplasty of right superficial femoral artery. Selective catheterization of right tibioperoneal trunk. Angioplasty of peroneal artery and tibioperoneal trunk.  VASCULAR SURGERY  10/30/2018    [facility]. Ultrasound guided cannulation of right internal vein. Placement of right internal jugular vein tunneled dialysis catheter bard equistream xk 23cm tip to cuff    VASCULAR SURGERY  2018    [facility]. Removal of tunneled dilaysis catheter right internal jugular vein. Social History:    Social History     Tobacco Use    Smoking status: Former Smoker     Packs/day: 2.00     Years: 48.00     Pack years: 96.00     Types: Cigarettes     Quit date: 6/3/2003     Years since quittin.7    Smokeless tobacco: Never Used   Substance Use Topics    Alcohol use: Yes     Alcohol/week: 12.0 standard drinks     Types: 12 Glasses of wine per week     Comment: 1 glasses of wine every night                                Counseling given: Not Answered      Vital Signs (Current): There were no vitals filed for this visit.                                            BP Readings from Last 3 Encounters:   22 129/63   22 115/65   22 127/63       NPO Status:                                                                                 BMI:   Wt Readings from Last 3 Encounters:   03/07/22 217 lb (98.4 kg)   02/16/22 217 lb (98.4 kg)   01/13/22 213 lb (96.6 kg)     There is no height or weight on file to calculate BMI.    CBC:   Lab Results   Component Value Date    WBC 11.8 12/05/2021    RBC 4.20 12/05/2021    HGB 11.7 12/05/2021    HCT 39.1 12/05/2021    MCV 93.1 12/05/2021    RDW 13.7 12/05/2021     12/05/2021       CMP:   Lab Results   Component Value Date     12/05/2021    K 3.3 12/05/2021    K 4.3 11/23/2021     12/05/2021    CO2 31 12/05/2021    BUN 41 12/05/2021    CREATININE 2.8 12/05/2021    GFRAA 26 12/05/2021    LABGLOM 22 12/05/2021    GLUCOSE 86 12/05/2021    PROT 5.7 12/05/2021    CALCIUM 8.4 12/05/2021    BILITOT 0.4 12/05/2021    ALKPHOS 71 12/05/2021    AST 13 12/05/2021    ALT 15 12/05/2021       POC Tests:   Recent Labs     03/07/22  1019   POCGLU 85       Coags:   Lab Results   Component Value Date    PROTIME 13.4 12/05/2021    INR 1.00 12/05/2021    APTT 31.0 11/23/2021       HCG (If Applicable): No results found for: PREGTESTUR, PREGSERUM, HCG, HCGQUANT     ABGs:   Lab Results   Component Value Date    PHART 7.450 11/24/2021    PO2ART 72.0 11/24/2021    OIA0AMB 40.0 11/24/2021    ZTI7LYO 27.8 11/24/2021    BEART 3.5 11/24/2021    I5PEEJKP 92.9 11/24/2021        Type & Screen (If Applicable):  No results found for: LABABO, LABRH    Drug/Infectious Status (If Applicable):  No results found for: HIV, HEPCAB    COVID-19 Screening (If Applicable):   Lab Results   Component Value Date    COVID19 Not Detected 03/04/2022           Anesthesia Evaluation  Patient summary reviewed and Nursing notes reviewed no history of anesthetic complications:   Airway: Mallampati: II  TM distance: >3 FB   Neck ROM: full  Mouth opening: > = 3 FB Dental: normal exam         Pulmonary:normal exam    (+) COPD:  shortness of breath: chronic,                             Cardiovascular:    (+) hypertension:, pacemaker:, dysrhythmias: atrial fibrillation, CHF:,       ECG reviewed  Rhythm: regular    Echocardiogram reviewed         Beta Blocker:  Not on Beta Blocker      ROS comment: Summary   Normal left ventricular chamber size and systolic function. Left ventricular ejection fraction is visually estimated at 60%. Grade 2 LV diastolic dysfunction. There is mild mitral regurgitation. Neuro/Psych:   Negative Neuro/Psych ROS              GI/Hepatic/Renal:   (+) GERD:,           Endo/Other:    (+) DiabetesType II DM, , blood dyscrasia: anemia:., malignancy/cancer (bladder). Pt had PAT visit. Abdominal:       Abdomen: soft. Vascular: negative vascular ROS. Other Findings:               Anesthesia Plan      general     ASA 4       Induction: intravenous. Anesthetic plan and risks discussed with patient. Use of blood products discussed with patient whom.                    ELISABETH Arnold - CRNA   3/7/2022

## 2022-03-11 RX ORDER — PLECANATIDE 3 MG/1
TABLET ORAL
Qty: 30 TABLET | Refills: 5 | Status: SHIPPED | OUTPATIENT
Start: 2022-03-11

## 2022-03-12 NOTE — CARE COORDINATION
Spoke with patient about order for Trilogy and asked him where he would like it to be ordered from. [facility] and would like for me to send referral to them. Referral for Trilogy faxed to Juan Daniel.   Juan Daniel Ph: [phone]  Fa: [phone]  Electronically signed by Geronimo Rodríguez RN on 3/11/2021 at 4:11 PM no

## 2022-04-20 ENCOUNTER — TELEPHONE (OUTPATIENT)
Dept: CARDIOLOGY CLINIC | Age: 82
End: 2022-04-20

## 2022-04-21 ENCOUNTER — OFFICE VISIT (OUTPATIENT)
Dept: CARDIOLOGY CLINIC | Age: 82
End: 2022-04-21
Payer: MEDICARE

## 2022-04-21 VITALS
BODY MASS INDEX: 29.12 KG/M2 | HEART RATE: 63 BPM | WEIGHT: 215 LBS | HEIGHT: 72 IN | SYSTOLIC BLOOD PRESSURE: 120 MMHG | DIASTOLIC BLOOD PRESSURE: 76 MMHG

## 2022-04-21 DIAGNOSIS — Z79.899 LONG TERM CURRENT USE OF ANTIARRHYTHMIC DRUG: ICD-10-CM

## 2022-04-21 DIAGNOSIS — I48.91 ATRIAL FIBRILLATION, UNSPECIFIED TYPE (HCC): ICD-10-CM

## 2022-04-21 DIAGNOSIS — E78.00 PURE HYPERCHOLESTEROLEMIA: ICD-10-CM

## 2022-04-21 DIAGNOSIS — Z95.0 PACEMAKER: Primary | ICD-10-CM

## 2022-04-21 DIAGNOSIS — I50.32 CHRONIC DIASTOLIC CONGESTIVE HEART FAILURE (HCC): ICD-10-CM

## 2022-04-21 DIAGNOSIS — I10 ESSENTIAL HYPERTENSION: ICD-10-CM

## 2022-04-21 DIAGNOSIS — R06.02 SOB (SHORTNESS OF BREATH): ICD-10-CM

## 2022-04-21 DIAGNOSIS — I70.213 ATHEROSCLEROSIS OF NATIVE ARTERY OF BOTH LOWER EXTREMITIES WITH INTERMITTENT CLAUDICATION (HCC): ICD-10-CM

## 2022-04-21 DIAGNOSIS — I47.29 NONSUSTAINED VENTRICULAR TACHYCARDIA: ICD-10-CM

## 2022-04-21 PROCEDURE — 93280 PM DEVICE PROGR EVAL DUAL: CPT | Performed by: INTERNAL MEDICINE

## 2022-04-21 PROCEDURE — 1036F TOBACCO NON-USER: CPT | Performed by: INTERNAL MEDICINE

## 2022-04-21 PROCEDURE — G8428 CUR MEDS NOT DOCUMENT: HCPCS | Performed by: INTERNAL MEDICINE

## 2022-04-21 PROCEDURE — 4040F PNEUMOC VAC/ADMIN/RCVD: CPT | Performed by: INTERNAL MEDICINE

## 2022-04-21 PROCEDURE — G8417 CALC BMI ABV UP PARAM F/U: HCPCS | Performed by: INTERNAL MEDICINE

## 2022-04-21 PROCEDURE — 99214 OFFICE O/P EST MOD 30 MIN: CPT | Performed by: INTERNAL MEDICINE

## 2022-04-21 PROCEDURE — 1123F ACP DISCUSS/DSCN MKR DOCD: CPT | Performed by: INTERNAL MEDICINE

## 2022-04-21 ASSESSMENT — ENCOUNTER SYMPTOMS
EYES NEGATIVE: 1
SHORTNESS OF BREATH: 0
DIARRHEA: 0
NAUSEA: 0
RESPIRATORY NEGATIVE: 1
GASTROINTESTINAL NEGATIVE: 1
VOMITING: 0

## 2022-04-21 NOTE — PROGRESS NOTES
[facility] Progress Note                            Date:  4/21/2022  Patient: Tala Paul  Age:  [de-identified] y.o., 1940      Reason for evaluation:         SUBJECTIVE:    Returns today follow-up assessment followed for a multitude of conditions history of pacemaker device interrogated today functioning appropriately over 10 years longevity thresholds and impedances satisfactory. No significant mode switching. Denies anginal chest pain dyspnea stable. Denies palpitations history of heart failure and atrial fibrillation hypertension hyperlipidemia. Blood pressure 120/76 heart 63. He does report that if he walks from here to the ER waiting room he will have fairly prominent fatigue and weakness in both legs his legs get very tired this is his limiting symptom. He reports having 2 stents placed in his lower extremities about 5 years ago in April with no follow-up since then. We will obtain a noninvasive arterial assessment of his lower extremity circulation. Patient agreeable. Review of Systems   Constitutional: Negative. Negative for chills, fever and unexpected weight change. HENT: Negative. Eyes: Negative. Respiratory: Negative. Negative for shortness of breath. Cardiovascular: Negative. Negative for chest pain. Gastrointestinal: Negative. Negative for diarrhea, nausea and vomiting. Endocrine: Negative. Genitourinary: Negative. Musculoskeletal: Negative. Skin: Negative. Neurological: Negative. All other systems reviewed and are negative. OBJECTIVE:     /76   Pulse 63   Ht 6' (1.829 m)   Wt 215 lb (97.5 kg)   BMI 29.16 kg/m²     Labs:   CBC: No results for input(s): WBC, HGB, HCT, PLT in the last 72 hours. BMP:No results for input(s): NA, K, CO2, BUN, CREATININE, LABGLOM, GLUCOSE in the last 72 hours. BNP: No results for input(s): BNP in the last 72 hours. PT/INR: No results for input(s): PROTIME, INR in the last 72 hours.   APTT:No results for input(s): APTT in the last 72 hours. CARDIAC ENZYMES:No results for input(s): CKTOTAL, CKMB, CKMBINDEX, TROPONINI in the last 72 hours. FASTING LIPID PANEL:  Lab Results   Component Value Date    HDL 38 10/01/2018    LDLCALC 72 10/01/2018    TRIG 176 10/01/2018     LIVER PROFILE:No results for input(s): AST, ALT, LABALBU in the last 72 hours. Past Medical History:   Diagnosis Date    Acute liver failure without hepatic coma 10/23/2018    Back pain     \"with tired legs as a result\"    Bladder cancer ([address] 12/19/2018    Blood circulation, collateral     Carotid arterial disease (HCC)     recent surgery    CKD (chronic kidney disease), 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/2018    Constipation     COPD with acute lower respiratory infection ([address] 02/24/2021    Epigastric discomfort     GERD (gastroesophageal reflux disease)     Hyperlipidemia     Hypertension     Hypertension     Pacemaker     Palliative care patient 10/23/2018    Pneumonia due to infectious organism 11/06/2018    Primary osteoarthritis of left knee 10/14/2018    PVD (peripheral vascular disease) (HCC)     Tremor     Tremor on Right side x 1-2 weeks per stepdaughter    Type 2 diabetes mellitus with complication, without long-term current use of insulin ([facility] 01/21/2021     Past Surgical History:   Procedure Laterality Date    BACK SURGERY      CARDIAC CATHETERIZATION  03/23/2021    100% RCA    COLONOSCOPY  2007?     CYSTOSCOPY Bilateral 12/19/2018    CYSTOSCOPY, BIOSPY FULGURATION OF BLADDER TUMOR POSSIBLE TURBT, RETROGRADE PYELOGRAM performed by Jannet Dominique MD at [address] COLONOSCOPY FLX DX W/COLLJ SPEC WHEN PFRMD N/A 09/11/2017    Dr Gunjan Alston internal hemorrhoids, diverticular disease-HP-No recall (age)   [facility] REVISE MEDIAN N/CARPAL TUNNEL SURG Left 07/18/2018    OPEN CARPAL TUNNEL RELEASE performed by Juan Carlos Joiner MD at [address] Left 08/28/2018    LEFT CAROTID ENDARTERECTOMY WITH VEIN PATCH ANGIOPLASTY AND COMPLETION ANGIOGRAM performed by Kyree Vera MD at [facility] Left 10/15/2018    LEFT COMPLEX TOTAL KNEE ARTHROPLASTY performed by Paula Bhatti MD at [address] [place] [address] GASTROINTESTINAL ENDOSCOPY N/A 11/18/2021    Dr Tari Goodell, Sub prep lg amount of solid and semisolid food/Medication in stomach body/antrum/pylorus, stomach lumen appeared to distended w air insufflation and collapse upon suction,   inadequate exam sugg of gastroparesis, repeat in 10-14 days    UPPER GASTROINTESTINAL ENDOSCOPY N/A 01/06/2022    Dr Tari Goodell, distal stomach body likely GIST nodule-Submucosal fundic body gastric mucosa, Benign, (-)Hpylori, (-)Sprue    UPPER GASTROINTESTINAL ENDOSCOPY N/A 03/07/2022    Dr Tari Goodell, EUS submucosal gastric nodule, otherwise normal, 1 year recall    UPPER GASTROINTESTINAL ENDOSCOPY N/A 03/07/2022    Dr Tari Goodell, W EUS,  nodular area at end of rugal gold in distal stomach body    VASCULAR SURGERY  04/21/2015    Lily LIU. Ultrasound guided access of left common femoral artery. Aortogram.Diagnostic right lower extremity arteriogram.Radiologic supervision and interpretation.  VASCULAR SURGERY  01/13/2015    Lily Rodriguez M.D Atherectomy,angioplasty,and stenting of left superficial femoral artery.  VASCULAR SURGERY  03/11/2014    Lily Rodriguez M.D. Ultrasound-guided access of right common femoral artery. Aortogram.Left lower extremity arteriogram.Atherectomy and angioplasty of left superficial femoral artery. Radiologic supervision and interpretation.  VASCULAR SURGERY  01/18/2013    Lily BESS Aortogram.Multistation arteriogram right lower extremity. Laser atherectomy and angioplasty of right superficial femoral artery. Selective catheterization of right tibioperoneal trunk. Angioplasty of peroneal artery and tibioperoneal trunk.     VASCULAR SURGERY 10/30/2018    [facility]. Ultrasound guided cannulation of right internal vein. Placement of right internal jugular vein tunneled dialysis catheter bard tian faithk 23cm tip to cuff    VASCULAR SURGERY  12/17/2018    [facility]. Removal of tunneled dilaysis catheter right internal jugular vein. Family History   Problem Relation Age of Onset    Colon Cancer Father     Diabetes Brother     Colon Polyps Neg Hx     Liver Cancer Neg Hx     Liver Disease Neg Hx     Esophageal Cancer Neg Hx     Rectal Cancer Neg Hx     Stomach Cancer Neg Hx      Allergies   Allergen Reactions    Eliquis [Apixaban] Other (See Comments)     \"almost bled to death\"    Promethazine Hcl Other (See Comments)     He became encephalopathic for several hours and was not responsive.      Current Outpatient Medications   Medication Sig Dispense Refill    TRULANCE 3 MG TABS TAKE 1 TABLET BY MOUTH DAILY 30 tablet 5    amiodarone (CORDARONE) 200 MG tablet TAKE 1 TABLET BY MOUTH DAILY 30 tablet 3    UNABLE TO FIND Take 20 mg by mouth daily trosopium      tamsulosin (FLOMAX) 0.4 MG capsule Take 2 capsules by mouth daily 60 capsule 11    pantoprazole (PROTONIX) 20 MG tablet Take 20 mg by mouth daily      SENNA PLUS 8.6-50 MG per tablet       glipiZIDE (GLUCOTROL XL) 10 MG extended release tablet TAKE 1 TABLET BY MOUTH EVERY DAY      bumetanide (BUMEX) 1 MG tablet Take 1 mg by mouth daily      mirabegron (MYRBETRIQ) 25 MG TB24 Take 1 tablet by mouth daily 90 tablet 3    aspirin 81 MG EC tablet Take 1 tablet by mouth daily 30 tablet 3    atorvastatin (LIPITOR) 20 MG tablet Take 1 tablet by mouth daily 30 tablet 0    metoprolol succinate (TOPROL XL) 100 MG extended release tablet Take 1 tablet by mouth daily 30 tablet 3    NIFEdipine (ADALAT CC) 60 MG extended release tablet Take 1 tablet by mouth daily 30 tablet 3    ferrous sulfate (IRON 325) 325 (65 Fe) MG tablet Take 1 tablet by mouth 2 times daily (with meals) 30 tablet 3    folic acid (FOLVITE) 1 MG tablet Take 1 tablet by mouth daily 30 tablet 3    vitamin D (ERGOCALCIFEROL) 1.25 MG (97549 UT) CAPS capsule Take 1 capsule by mouth once a week 5 capsule 0    OXYGEN Inhale 2 L into the lungs continuous 1 Container 5     No current facility-administered medications for this visit. Social History     Socioeconomic History    Marital status:      Spouse name: Not on file    Number of children: Not on file    Years of education: Not on file    Highest education level: Not on file   Occupational History    Not on file   Tobacco Use    Smoking status: Former Smoker     Packs/day: 2.00     Years: 48.00     Pack years: 96.00     Types: Cigarettes     Quit date: 6/3/2003     Years since quittin.8    Smokeless tobacco: Never Used   Vaping Use    Vaping Use: Never used   Substance and Sexual Activity    Alcohol use: Yes     Alcohol/week: 12.0 standard drinks     Types: 12 Glasses of wine per week     Comment: 1 glasses of wine every night    Drug use: No    Sexual activity: Yes     Partners: Female   Other Topics Concern    Not on file   Social History Narrative    Not on file     Social Determinants of Health     Financial Resource Strain:     Difficulty of Paying Living Expenses: Not on file   Food Insecurity:     Worried About Running Out of Food in the Last Year: Not on file    Ankit of Food in the Last Year: Not on file   Transportation Needs:     Lack of Transportation (Medical): Not on file    Lack of Transportation (Non-Medical):  Not on file   Physical Activity:     Days of Exercise per Week: Not on file    Minutes of Exercise per Session: Not on file   Stress:     Feeling of Stress : Not on file   Social Connections:     Frequency of Communication with Friends and Family: Not on file    Frequency of Social Gatherings with Friends and Family: Not on file    Attends Baptism Services: Not on file    Active Member of Clubs or Organizations: Not on file   Yuri Roque Attends Club or Organization Meetings: Not on file    Marital Status: Not on file   Intimate Partner Violence:     Fear of Current or Ex-Partner: Not on file    Emotionally Abused: Not on file    Physically Abused: Not on file    Sexually Abused: Not on file   Housing Stability:     Unable to Pay for Housing in the Last Year: Not on file    Number of [place] in the Last Year: Not on file    Unstable Housing in the Last Year: Not on file       Physical Examination:  /76   Pulse 63   Ht 6' (1.829 m)   Wt 215 lb (97.5 kg)   BMI 29.16 kg/m²   Physical Exam  Vitals reviewed. Constitutional:       Appearance: He is well-developed. Neck:      Vascular: No carotid bruit or JVD. Cardiovascular:      Rate and Rhythm: Normal rate and regular rhythm. Heart sounds: Normal heart sounds. No murmur heard. No friction rub. No gallop. Pulmonary:      Effort: Pulmonary effort is normal. No respiratory distress. Breath sounds: Normal breath sounds. No wheezing or rales. Abdominal:      General: There is no distension. Tenderness: There is no abdominal tenderness. Lymphadenopathy:      Cervical: No cervical adenopathy. Skin:     General: Skin is warm and dry. ASSESSMENT:     Diagnosis Orders   1. Pacemaker     2. Chronic diastolic congestive heart failure ([address]     3. Essential hypertension     4. Nonsustained ventricular tachycardia ([address]     5. Pure hypercholesterolemia     6. Atherosclerosis of native artery of both lower extremities with intermittent claudication (HCC)  VL LOWER EXTREMITY ARTERIAL SEGMENTAL PRESSURES W PPG   7. Atrial fibrillation, unspecified type ([address]     8. SOB (shortness of breath)     9. Long term current use of antiarrhythmic drug         PLAN:  Orders Placed This Encounter   Procedures    VL LOWER EXTREMITY ARTERIAL SEGMENTAL PRESSURES W PPG     No orders of the defined types were placed in this encounter.         1. Continue present medications  2. Recommend noninvasive arterial assessment bilateral lower extremities  3. Recommend follow-up assessment in 6 months    Return in about 6 months (around 10/21/2022) for return to Dr. Tanya Gilmore only. Deniz Devi MD 4/21/2022 2:01 PM CDT    [facility]      Thisdictation was generated by voice recognition computer software. Although all attempts are made to edit the dictation for accuracy, there may be errors in the transcription that are not intended.

## 2022-05-18 RX ORDER — AMIODARONE HYDROCHLORIDE 200 MG/1
200 TABLET ORAL DAILY
Qty: 30 TABLET | Refills: 3 | Status: SHIPPED | OUTPATIENT
Start: 2022-05-18 | End: 2022-09-28

## 2022-05-20 ENCOUNTER — OFFICE VISIT (OUTPATIENT)
Dept: UROLOGY | Age: 82
End: 2022-05-20
Payer: MEDICARE

## 2022-05-20 VITALS
BODY MASS INDEX: 30.12 KG/M2 | TEMPERATURE: 98.1 F | HEIGHT: 72 IN | DIASTOLIC BLOOD PRESSURE: 91 MMHG | SYSTOLIC BLOOD PRESSURE: 114 MMHG | WEIGHT: 222.4 LBS

## 2022-05-20 DIAGNOSIS — R35.0 BENIGN PROSTATIC HYPERPLASIA WITH URINARY FREQUENCY: Primary | ICD-10-CM

## 2022-05-20 DIAGNOSIS — N39.41 URGE INCONTINENCE: ICD-10-CM

## 2022-05-20 DIAGNOSIS — N40.1 BENIGN PROSTATIC HYPERPLASIA WITH URINARY FREQUENCY: Primary | ICD-10-CM

## 2022-05-20 LAB
BACTERIA URINE, POC: ABNORMAL
BILIRUBIN URINE: 1 MG/DL
BLOOD, URINE: POSITIVE
CASTS URINE, POC: 0
CLARITY: CLEAR
COLOR: YELLOW
CRYSTALS URINE, POC: 0
EPI CELLS URINE, POC: 0
GLUCOSE URINE: ABNORMAL
KETONES, URINE: NEGATIVE
LEUKOCYTE EST, POC: ABNORMAL
NITRITE, URINE: NEGATIVE
PH UA: 5 (ref 4.5–8)
PROTEIN UA: POSITIVE
RBC URINE, POC: 0
SPECIFIC GRAVITY UA: 1.02 (ref 1–1.03)
UROBILINOGEN, URINE: NORMAL
WBC URINE, POC: ABNORMAL
YEAST URINE, POC: 0

## 2022-05-20 PROCEDURE — G8427 DOCREV CUR MEDS BY ELIG CLIN: HCPCS | Performed by: NURSE PRACTITIONER

## 2022-05-20 PROCEDURE — 99213 OFFICE O/P EST LOW 20 MIN: CPT | Performed by: NURSE PRACTITIONER

## 2022-05-20 PROCEDURE — 1036F TOBACCO NON-USER: CPT | Performed by: NURSE PRACTITIONER

## 2022-05-20 PROCEDURE — 51798 US URINE CAPACITY MEASURE: CPT | Performed by: NURSE PRACTITIONER

## 2022-05-20 PROCEDURE — 1123F ACP DISCUSS/DSCN MKR DOCD: CPT | Performed by: NURSE PRACTITIONER

## 2022-05-20 PROCEDURE — 81001 URINALYSIS AUTO W/SCOPE: CPT | Performed by: NURSE PRACTITIONER

## 2022-05-20 PROCEDURE — G8417 CALC BMI ABV UP PARAM F/U: HCPCS | Performed by: NURSE PRACTITIONER

## 2022-05-20 RX ORDER — METOLAZONE 2.5 MG/1
TABLET ORAL
Status: ON HOLD | COMMUNITY
Start: 2022-04-18 | End: 2022-05-26 | Stop reason: SDUPTHER

## 2022-05-20 ASSESSMENT — ENCOUNTER SYMPTOMS
VOMITING: 0
ABDOMINAL PAIN: 0
NAUSEA: 0
ABDOMINAL DISTENTION: 0
BACK PAIN: 0

## 2022-05-20 NOTE — PROGRESS NOTES
Ирина Hannah is a [de-identified] y.o. male who presents today   Chief Complaint   Patient presents with    Follow-up     I am here today for BPH       Benign Prostatic Hypertrophy  Patient complains of lower urinary tract symptoms. He reports frequency, nocturia two times a night and urgency. He denies incomplete emptying, intermittency, straining and weak stream. Patient states symptoms are of moderate severity. Onset of symptoms was 2 months ago and was gradual in onset. His AUA Symptom Score is, 11/35 manifested as irritative symptoms including frequency, urgency, nocturia. He has no personal history and no family history of prostate cancer. He reports a history of no complicating symptoms. He denies flank pain, gross hematuria, kidney stones and recurrent UTI. Currently maintained on tamsulosin. For the past 2 months frequency and urgency has worsened. He is now wearing a Pampers 24/7. He states just with the thoughts of urination he starts having an incontinent episode. These episodes can be 3-4 times within just 2 hours. Also complains of urgency one-point into the driveway and with touching water. Currently maintained on Myrbetriq 25 mg daily. He was previously on trospium as well although this caused chronic constipation and subsequent urinary retention. He is also followed with Dr. Ethan Tesfaye for history of bladder cancer. He is requesting to restart the trospium.      Past Medical History:   Diagnosis Date    Acute liver failure without hepatic coma 10/23/2018    Back pain     \"with tired legs as a result\"    Bladder cancer ([facility] [address] 12/19/2018    Blood circulation, collateral     Carotid arterial disease (HCC)     recent surgery    CKD (chronic kidney disease), 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/2018    Constipation     COPD with acute lower respiratory infection ([address] 02/24/2021    Epigastric discomfort     GERD (gastroesophageal reflux disease)     Hyperlipidemia     Hypertension     Hypertension     Pacemaker  Palliative care patient 10/23/2018    Pneumonia due to infectious organism 11/06/2018    Primary osteoarthritis of left knee 10/14/2018    PVD (peripheral vascular disease) (HCC)     Tremor     Tremor on Right side x 1-2 weeks per stepdaughter    Type 2 diabetes mellitus with complication, without long-term current use of insulin ([facility] [address] 01/21/2021       Past Surgical History:   Procedure Laterality Date    BACK SURGERY      CARDIAC CATHETERIZATION  03/23/2021    100% RCA    COLONOSCOPY  2007?     CYSTOSCOPY Bilateral 12/19/2018    CYSTOSCOPY, BIOSPY FULGURATION OF BLADDER TUMOR POSSIBLE TURBT, RETROGRADE PYELOGRAM performed by Cleo Albarado MD at [address] COLONOSCOPY FLX DX W/COLLJ SPEC WHEN PFRMD N/A 09/11/2017    Dr Chasity Nunes internal hemorrhoids, diverticular disease-HP-No recall (age)   [facility] REVISE MEDIAN N/CARPAL TUNNEL SURG Left 07/18/2018    OPEN CARPAL TUNNEL RELEASE performed by Bere eBgum MD at [address] Left 08/28/2018    LEFT CAROTID ENDARTERECTOMY WITH VEIN PATCH ANGIOPLASTY AND COMPLETION ANGIOGRAM performed by Chucky Bartholomew MD at [address] [facility] Left 10/15/2018    LEFT COMPLEX TOTAL KNEE ARTHROPLASTY performed by Bere Begum MD at [address] [facility] ENDOSCOPY N/A 11/18/2021    Dr Delonte Flynn, Sub prep lg amount of solid and semisolid food/Medication in stomach body/antrum/pylorus, stomach lumen appeared to distended w air insufflation and collapse upon suction,   inadequate exam sugg of gastroparesis, repeat in 10-14 days    UPPER GASTROINTESTINAL ENDOSCOPY N/A 01/06/2022    Dr Delonte Flynn, 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 Aortogram.Diagnostic right lower extremity arteriogram.Radiologic supervision and interpretation.  VASCULAR SURGERY  01/13/2015    Colleen Benjamin M.D Atherectomy,angioplasty,and stenting of left superficial femoral artery.  VASCULAR SURGERY  03/11/2014    Colleen Benjamin M.D. Ultrasound-guided access of right common femoral artery. Aortogram.Left lower extremity arteriogram.Atherectomy and angioplasty of left superficial femoral artery. Radiologic supervision and interpretation.  VASCULAR SURGERY  01/18/2013    Colleen BESS Aortogram.Multistation arteriogram right lower extremity. Laser atherectomy and angioplasty of right superficial femoral artery. Selective catheterization of right tibioperoneal trunk. Angioplasty of peroneal artery and tibioperoneal trunk.  VASCULAR SURGERY  10/30/2018    [facility]. Ultrasound guided cannulation of right internal vein. Placement of right internal jugular vein tunneled dialysis catheter bard equistream xk 23cm tip to cuff    VASCULAR SURGERY  12/17/2018    [facility]. Removal of tunneled dilaysis catheter right internal jugular vein.        Current Outpatient Medications   Medication Sig Dispense Refill    mirabegron (MYRBETRIQ) 25 MG TB24 Take 1 tablet by mouth daily 90 tablet 3    amiodarone (CORDARONE) 200 MG tablet TAKE 1 TABLET BY MOUTH DAILY 30 tablet 3    TRULANCE 3 MG TABS TAKE 1 TABLET BY MOUTH DAILY 30 tablet 5    UNABLE TO FIND Take 20 mg by mouth daily trosopium      tamsulosin (FLOMAX) 0.4 MG capsule Take 2 capsules by mouth daily 60 capsule 11    pantoprazole (PROTONIX) 20 MG tablet Take 20 mg by mouth daily      SENNA PLUS 8.6-50 MG per tablet       glipiZIDE (GLUCOTROL XL) 10 MG extended release tablet TAKE 1 TABLET BY MOUTH EVERY DAY      bumetanide (BUMEX) 1 MG tablet Take 1 mg by mouth daily      aspirin 81 MG EC tablet Take 1 tablet by mouth daily 30 tablet 3    atorvastatin (LIPITOR) 20 MG tablet Take 1 tablet by mouth daily 30 tablet 0    metoprolol succinate (TOPROL XL) 100 MG extended release tablet Take 1 tablet by mouth daily 30 tablet 3    NIFEdipine (ADALAT CC) 60 MG extended release tablet Take 1 tablet by mouth daily 30 tablet 3    ferrous sulfate (IRON 325) 325 (65 Fe) MG tablet Take 1 tablet by mouth 2 times daily (with meals) 30 tablet 3    folic acid (FOLVITE) 1 MG tablet Take 1 tablet by mouth daily 30 tablet 3    vitamin D (ERGOCALCIFEROL) 1.25 MG (21941 UT) CAPS capsule Take 1 capsule by mouth once a week 5 capsule 0    OXYGEN Inhale 2 L into the lungs continuous 1 Container 5    metOLazone (ZAROXOLYN) 2.5 MG tablet TAKE 1 TABLET BY MOUTH EVERY DAY AS NEEDED FOR SWELLING       No current facility-administered medications for this visit. Allergies   Allergen Reactions    Eliquis [Apixaban] Other (See Comments)     \"almost bled to death\"    Promethazine Hcl Other (See Comments)     He became encephalopathic for several hours and was not responsive. Social History     Socioeconomic History    Marital status:      Spouse name: None    Number of children: None    Years of education: None    Highest education level: None   Occupational History    None   Tobacco Use    Smoking status: Former Smoker     Packs/day: 2.00     Years: 48.00     Pack years: 96.00     Types: Cigarettes     Quit date: 6/3/2003     Years since quittin.9    Smokeless tobacco: Never Used   Vaping Use    Vaping Use: Never used   Substance and Sexual Activity    Alcohol use:  Yes     Alcohol/week: 12.0 standard drinks     Types: 12 Glasses of wine per week     Comment: 1 glasses of wine every night    Drug use: No    Sexual activity: Yes     Partners: Female   Other Topics Concern    None   Social History Narrative    None     Social Determinants of Health     Financial Resource Strain:     Difficulty of Paying Living Expenses: Not on file   Food Insecurity:     Worried About Running Out of Food in the Last Year: Not on file    Ankit of Food in the Last Year: Not on file   Transportation Needs:     Lack of Transportation (Medical): Not on file    Lack of Transportation (Non-Medical): Not on file   Physical Activity:     Days of Exercise per Week: Not on file    Minutes of Exercise per Session: Not on file   Stress:     Feeling of Stress : Not on file   Social Connections:     Frequency of Communication with Friends and Family: Not on file    Frequency of Social Gatherings with Friends and Family: Not on file    Attends Restorationist Services: Not on file    Active Member of [address] [place] [address] [employer] or Organizations: Not on file    Attends Club or Organization Meetings: Not on file    Marital Status: Not on file   Intimate Partner Violence:     Fear of Current or Ex-Partner: Not on file    Emotionally Abused: Not on file    Physically Abused: Not on file    Sexually Abused: Not on file   Housing Stability:     Unable to Pay for Housing in the Last Year: Not on file    Number of [place] in the Last Year: Not on file    Unstable Housing in the Last Year: Not on file       Family History   Problem Relation Age of Onset    Colon Cancer Father     Diabetes Brother     Colon Polyps Neg Hx     Liver Cancer Neg Hx     Liver Disease Neg Hx     Esophageal Cancer Neg Hx     Rectal Cancer Neg Hx     Stomach Cancer Neg Hx        REVIEW OF SYSTEMS:  Review of Systems   Constitutional: Negative for chills and fever. Gastrointestinal: Negative for abdominal distention, abdominal pain, nausea and vomiting. Genitourinary: Positive for frequency and urgency. Negative for difficulty urinating, dysuria, flank pain and hematuria. Musculoskeletal: Negative for back pain and gait problem.    Psychiatric/Behavioral: Negative for agitation and confusion. PHYSICAL EXAM:  BP (!) 114/91   Temp 98.1 °F (36.7 °C) (Temporal)   Ht 6' (1.829 m)   Wt 222 lb 6.4 oz (100.9 kg)   BMI 30.16 kg/m²   Physical Exam  Vitals and nursing note reviewed. Constitutional:       General: He is not in acute distress. Appearance: Normal appearance. He is not ill-appearing. Pulmonary:      Effort: Pulmonary effort is normal. No respiratory distress. Abdominal:      General: There is no distension. Tenderness: There is no abdominal tenderness. There is no right CVA tenderness or left CVA tenderness. Neurological:      Mental Status: He is alert and oriented to person, place, and time. Mental status is at baseline. Motor: Weakness present. Psychiatric:         Mood and Affect: Mood normal.         Behavior: Behavior normal.       DATA:    Results for orders placed or performed in visit on 05/20/22   POCT Urinalysis Dipstick w/ Micro (Auto)   Result Value Ref Range    Color, UA Yellow     Clarity, UA Clear Clear    Glucose, Ur NEG     Bilirubin Urine 1 mg/dL    Ketones, Urine Negative     Specific Gravity, UA 1.025 1.005 - 1.030    Blood, Urine Positive     pH, UA 5 4.5 - 8.0    Protein, UA Positive (A) Negative    Nitrite, Urine Negative     Leukocytes, UA MODERATE     Urobilinogen, Urine Normal     rbc urine, poc 0     wbc urine, poc TNTC     bacteria urine, poc 2+     yeast urine, poc 0     casts urine, poc 0     Epi Cells Urine, POC 0     crystals urine, poc 0      IMAGING:  Bladder Scan interpretation  Estimation of residual urine via abdominal ultrasound  Residual Urine: 0 ml  Indication: frequency   Position: Supine  Examination: Incremental scanning of the suprapubic area using 3 MHz transducer using copious amounts of acoustic gel. Findings: An anechoic area was demonstrated which represented the bladder, with measurement of residual urine as noted.       1. Benign prostatic hyperplasia with urinary frequency  Continue tamsulosin    - mirabegron (MYRBETRIQ) 25 MG TB24; Take 1 tablet by mouth daily  Dispense: 90 tablet; Refill: 3    2. Urge incontinence  Increase in frequency, urgency and urge incontinence. Currently on Myrbetriq 25 mg daily. Unable to tolerate trospium due to severe constipation. I discussed with him this is what caused his urinary retention although he does want to still try this. I do advise against this. We will go ahead and send his urine for culture since it did appear suspicious today. If this is negative we will likely start posttibial nerve stimulation. We have discussed this and I have given handouts today. Orders Placed This Encounter   Procedures    Culture, Urine     Order Specific Question:   Specify (ex-cath, midstream, cysto, etc)? Answer:   clean catch    POCT Urinalysis Dipstick w/ Micro (Auto)    WI Measure, post-void residual, US, non-imaging        Return if symptoms worsen or fail to improve. All information inputted into the note by the MA to include chief complaint, past medical history, past surgical history, medications, allergies, social and family history and review of systems has been reviewed and updated as needed by me. EMR Dragon/transcription disclaimer: Much of this documentt is electronic  transcription/translation of spoken language to printed text. The  electronic translation of spoken language may be erroneous, or at times,  nonsensical words or phrases may be inadvertently transcribed.  Although I  have reviewed the document for such errors, some may still exist.

## 2022-05-22 LAB
ORGANISM: ABNORMAL
URINE CULTURE, ROUTINE: ABNORMAL
URINE CULTURE, ROUTINE: ABNORMAL

## 2022-05-23 DIAGNOSIS — N30.00 ACUTE CYSTITIS WITHOUT HEMATURIA: Primary | ICD-10-CM

## 2022-05-23 RX ORDER — CEPHALEXIN 500 MG/1
500 CAPSULE ORAL 2 TIMES DAILY
Qty: 20 CAPSULE | Refills: 0 | Status: ON HOLD | OUTPATIENT
Start: 2022-05-23 | End: 2022-05-26 | Stop reason: HOSPADM

## 2022-05-23 NOTE — RESULT ENCOUNTER NOTE
Please let patient know his urine culture was positive for infection.   I am going to send him in Keflex to start today

## 2022-05-24 ENCOUNTER — APPOINTMENT (OUTPATIENT)
Dept: GENERAL RADIOLOGY | Age: 82
DRG: 291 | End: 2022-05-24
Payer: MEDICARE

## 2022-05-24 ENCOUNTER — HOSPITAL ENCOUNTER (INPATIENT)
Age: 82
LOS: 2 days | Discharge: HOME | DRG: 291 | End: 2022-05-26
Attending: HOSPITALIST | Admitting: HOSPITALIST
Payer: MEDICARE

## 2022-05-24 DIAGNOSIS — I44.7 LEFT BUNDLE BRANCH BLOCK: ICD-10-CM

## 2022-05-24 DIAGNOSIS — I50.9 ACUTE ON CHRONIC CONGESTIVE HEART FAILURE, UNSPECIFIED HEART FAILURE TYPE (HCC): Primary | ICD-10-CM

## 2022-05-24 PROBLEM — M54.9 BACK PAIN: Status: ACTIVE | Noted: 2022-05-24

## 2022-05-24 PROBLEM — I73.9 PVD (PERIPHERAL VASCULAR DISEASE) (HCC): Status: ACTIVE | Noted: 2022-05-24

## 2022-05-24 PROBLEM — I50.23 ACUTE ON CHRONIC SYSTOLIC (CONGESTIVE) HEART FAILURE (HCC): Status: ACTIVE | Noted: 2022-05-24

## 2022-05-24 PROBLEM — R25.1 TREMOR: Status: ACTIVE | Noted: 2022-05-24

## 2022-05-24 PROBLEM — I77.9 CAROTID ARTERIAL DISEASE (HCC): Status: ACTIVE | Noted: 2022-05-24

## 2022-05-24 PROBLEM — E78.5 HYPERLIPIDEMIA: Status: ACTIVE | Noted: 2022-05-24

## 2022-05-24 PROBLEM — R10.13 EPIGASTRIC DISCOMFORT: Status: ACTIVE | Noted: 2022-05-24

## 2022-05-24 PROBLEM — N39.41 URGE INCONTINENCE OF URINE: Status: ACTIVE | Noted: 2022-05-24

## 2022-05-24 LAB
ADENOVIRUS BY PCR: NOT DETECTED
ALBUMIN SERPL-MCNC: 4.8 G/DL (ref 3.5–5.2)
ALP BLD-CCNC: 118 U/L (ref 40–130)
ALT SERPL-CCNC: 26 U/L (ref 5–41)
ANION GAP SERPL CALCULATED.3IONS-SCNC: 14 MMOL/L (ref 7–19)
AST SERPL-CCNC: 26 U/L (ref 5–40)
BASOPHILS ABSOLUTE: 0 K/UL (ref 0–0.2)
BASOPHILS RELATIVE PERCENT: 0.3 % (ref 0–1)
BILIRUB SERPL-MCNC: 0.9 MG/DL (ref 0.2–1.2)
BORDETELLA PARAPERTUSSIS BY PCR: NOT DETECTED
BORDETELLA PERTUSSIS BY PCR: NOT DETECTED
BUN BLDV-MCNC: 51 MG/DL (ref 8–23)
CALCIUM SERPL-MCNC: 9.9 MG/DL (ref 8.8–10.2)
CHLAMYDOPHILIA PNEUMONIAE BY PCR: NOT DETECTED
CHLORIDE BLD-SCNC: 99 MMOL/L (ref 98–111)
CO2: 23 MMOL/L (ref 22–29)
CORONAVIRUS 229E BY PCR: NOT DETECTED
CORONAVIRUS HKU1 BY PCR: NOT DETECTED
CORONAVIRUS NL63 BY PCR: NOT DETECTED
CORONAVIRUS OC43 BY PCR: NOT DETECTED
CREAT SERPL-MCNC: 2.5 MG/DL (ref 0.5–1.2)
EOSINOPHILS ABSOLUTE: 0 K/UL (ref 0–0.6)
EOSINOPHILS RELATIVE PERCENT: 0 % (ref 0–5)
GFR AFRICAN AMERICAN: 30
GFR NON-AFRICAN AMERICAN: 25
GLUCOSE BLD-MCNC: 210 MG/DL (ref 74–109)
HCT VFR BLD CALC: 40.9 % (ref 42–52)
HEMOGLOBIN: 12.7 G/DL (ref 14–18)
HUMAN METAPNEUMOVIRUS BY PCR: NOT DETECTED
HUMAN RHINOVIRUS/ENTEROVIRUS BY PCR: NOT DETECTED
IMMATURE GRANULOCYTES #: 0.1 K/UL
INFLUENZA A BY PCR: NOT DETECTED
INFLUENZA B BY PCR: NOT DETECTED
LYMPHOCYTES ABSOLUTE: 0.6 K/UL (ref 1.1–4.5)
LYMPHOCYTES RELATIVE PERCENT: 5.2 % (ref 20–40)
MAGNESIUM: 2.2 MG/DL (ref 1.6–2.4)
MCH RBC QN AUTO: 28.9 PG (ref 27–31)
MCHC RBC AUTO-ENTMCNC: 31.1 G/DL (ref 33–37)
MCV RBC AUTO: 93.2 FL (ref 80–94)
MONOCYTES ABSOLUTE: 1 K/UL (ref 0–0.9)
MONOCYTES RELATIVE PERCENT: 8.6 % (ref 0–10)
MYCOPLASMA PNEUMONIAE BY PCR: NOT DETECTED
NEUTROPHILS ABSOLUTE: 9.7 K/UL (ref 1.5–7.5)
NEUTROPHILS RELATIVE PERCENT: 84.7 % (ref 50–65)
PARAINFLUENZA VIRUS 1 BY PCR: NOT DETECTED
PARAINFLUENZA VIRUS 2 BY PCR: NOT DETECTED
PARAINFLUENZA VIRUS 3 BY PCR: NOT DETECTED
PARAINFLUENZA VIRUS 4 BY PCR: NOT DETECTED
PDW BLD-RTO: 13.5 % (ref 11.5–14.5)
PHOSPHORUS: 3.1 MG/DL (ref 2.5–4.5)
PLATELET # BLD: 150 K/UL (ref 130–400)
PMV BLD AUTO: 10.4 FL (ref 9.4–12.4)
POTASSIUM REFLEX MAGNESIUM: 4.1 MMOL/L (ref 3.5–5)
PRO-BNP: 4513 PG/ML (ref 0–1800)
RBC # BLD: 4.39 M/UL (ref 4.7–6.1)
RESPIRATORY SYNCYTIAL VIRUS BY PCR: NOT DETECTED
SARS-COV-2, PCR: NOT DETECTED
SODIUM BLD-SCNC: 136 MMOL/L (ref 136–145)
T4 FREE: 1.73 NG/DL (ref 0.93–1.7)
TOTAL PROTEIN: 7.8 G/DL (ref 6.6–8.7)
TROPONIN: 0.04 NG/ML (ref 0–0.03)
TSH SERPL DL<=0.05 MIU/L-ACNC: 2.04 UIU/ML (ref 0.27–4.2)
VITAMIN D 25-HYDROXY: 60.7 NG/ML
WBC # BLD: 11.4 K/UL (ref 4.8–10.8)

## 2022-05-24 PROCEDURE — 2140000000 HC CCU INTERMEDIATE R&B

## 2022-05-24 PROCEDURE — 6360000002 HC RX W HCPCS: Performed by: PHYSICIAN ASSISTANT

## 2022-05-24 PROCEDURE — 83880 ASSAY OF NATRIURETIC PEPTIDE: CPT

## 2022-05-24 PROCEDURE — 36415 COLL VENOUS BLD VENIPUNCTURE: CPT

## 2022-05-24 PROCEDURE — 71045 X-RAY EXAM CHEST 1 VIEW: CPT

## 2022-05-24 PROCEDURE — 80053 COMPREHEN METABOLIC PANEL: CPT

## 2022-05-24 PROCEDURE — 84100 ASSAY OF PHOSPHORUS: CPT

## 2022-05-24 PROCEDURE — 85025 COMPLETE CBC W/AUTO DIFF WBC: CPT

## 2022-05-24 PROCEDURE — 82306 VITAMIN D 25 HYDROXY: CPT

## 2022-05-24 PROCEDURE — 84439 ASSAY OF FREE THYROXINE: CPT

## 2022-05-24 PROCEDURE — 99285 EMERGENCY DEPT VISIT HI MDM: CPT

## 2022-05-24 PROCEDURE — 84443 ASSAY THYROID STIM HORMONE: CPT

## 2022-05-24 PROCEDURE — 96374 THER/PROPH/DIAG INJ IV PUSH: CPT

## 2022-05-24 PROCEDURE — 84484 ASSAY OF TROPONIN QUANT: CPT

## 2022-05-24 PROCEDURE — 0202U NFCT DS 22 TRGT SARS-COV-2: CPT

## 2022-05-24 PROCEDURE — 83735 ASSAY OF MAGNESIUM: CPT

## 2022-05-24 RX ORDER — ONDANSETRON 2 MG/ML
4 INJECTION INTRAMUSCULAR; INTRAVENOUS EVERY 6 HOURS PRN
Status: DISCONTINUED | OUTPATIENT
Start: 2022-05-24 | End: 2022-05-26 | Stop reason: HOSPADM

## 2022-05-24 RX ORDER — GLIPIZIDE 5 MG/1
10 TABLET ORAL
Status: DISCONTINUED | OUTPATIENT
Start: 2022-05-25 | End: 2022-05-26 | Stop reason: HOSPADM

## 2022-05-24 RX ORDER — NIFEDIPINE 60 MG/1
60 TABLET, EXTENDED RELEASE ORAL DAILY
Status: DISCONTINUED | OUTPATIENT
Start: 2022-05-25 | End: 2022-05-26 | Stop reason: HOSPADM

## 2022-05-24 RX ORDER — SODIUM CHLORIDE 0.9 % (FLUSH) 0.9 %
10 SYRINGE (ML) INJECTION EVERY 12 HOURS SCHEDULED
Status: DISCONTINUED | OUTPATIENT
Start: 2022-05-24 | End: 2022-05-26 | Stop reason: HOSPADM

## 2022-05-24 RX ORDER — ASPIRIN 81 MG/1
81 TABLET ORAL DAILY
Status: DISCONTINUED | OUTPATIENT
Start: 2022-05-24 | End: 2022-05-26 | Stop reason: HOSPADM

## 2022-05-24 RX ORDER — ENOXAPARIN SODIUM 100 MG/ML
40 INJECTION SUBCUTANEOUS DAILY
Status: DISCONTINUED | OUTPATIENT
Start: 2022-05-24 | End: 2022-05-25 | Stop reason: ALTCHOICE

## 2022-05-24 RX ORDER — SODIUM CHLORIDE 0.9 % (FLUSH) 0.9 %
10 SYRINGE (ML) INJECTION PRN
Status: DISCONTINUED | OUTPATIENT
Start: 2022-05-24 | End: 2022-05-26 | Stop reason: HOSPADM

## 2022-05-24 RX ORDER — ERGOCALCIFEROL 1.25 MG/1
50000 CAPSULE ORAL WEEKLY
Status: DISCONTINUED | OUTPATIENT
Start: 2022-05-25 | End: 2022-05-26 | Stop reason: HOSPADM

## 2022-05-24 RX ORDER — METOPROLOL SUCCINATE 50 MG/1
100 TABLET, EXTENDED RELEASE ORAL DAILY
Status: DISCONTINUED | OUTPATIENT
Start: 2022-05-24 | End: 2022-05-26 | Stop reason: HOSPADM

## 2022-05-24 RX ORDER — ACETAMINOPHEN 325 MG/1
650 TABLET ORAL EVERY 6 HOURS PRN
Status: DISCONTINUED | OUTPATIENT
Start: 2022-05-24 | End: 2022-05-26 | Stop reason: HOSPADM

## 2022-05-24 RX ORDER — FOLIC ACID 1 MG/1
1 TABLET ORAL DAILY
Status: DISCONTINUED | OUTPATIENT
Start: 2022-05-25 | End: 2022-05-26 | Stop reason: HOSPADM

## 2022-05-24 RX ORDER — ATORVASTATIN CALCIUM 40 MG/1
20 TABLET, FILM COATED ORAL DAILY
Status: DISCONTINUED | OUTPATIENT
Start: 2022-05-25 | End: 2022-05-26 | Stop reason: HOSPADM

## 2022-05-24 RX ORDER — ACETAMINOPHEN 650 MG/1
650 SUPPOSITORY RECTAL EVERY 6 HOURS PRN
Status: DISCONTINUED | OUTPATIENT
Start: 2022-05-24 | End: 2022-05-26 | Stop reason: HOSPADM

## 2022-05-24 RX ORDER — PANTOPRAZOLE SODIUM 40 MG/1
40 TABLET, DELAYED RELEASE ORAL DAILY
Status: DISCONTINUED | OUTPATIENT
Start: 2022-05-24 | End: 2022-05-26 | Stop reason: HOSPADM

## 2022-05-24 RX ORDER — AMIODARONE HYDROCHLORIDE 200 MG/1
200 TABLET ORAL 2 TIMES DAILY
Status: DISCONTINUED | OUTPATIENT
Start: 2022-05-24 | End: 2022-05-26 | Stop reason: HOSPADM

## 2022-05-24 RX ORDER — PROMETHAZINE HYDROCHLORIDE 25 MG/1
12.5 TABLET ORAL EVERY 6 HOURS PRN
Status: DISCONTINUED | OUTPATIENT
Start: 2022-05-24 | End: 2022-05-26 | Stop reason: HOSPADM

## 2022-05-24 RX ORDER — METOLAZONE 2.5 MG/1
5 TABLET ORAL DAILY
Status: DISCONTINUED | OUTPATIENT
Start: 2022-05-24 | End: 2022-05-26 | Stop reason: HOSPADM

## 2022-05-24 RX ORDER — SODIUM CHLORIDE 9 MG/ML
INJECTION, SOLUTION INTRAVENOUS PRN
Status: DISCONTINUED | OUTPATIENT
Start: 2022-05-24 | End: 2022-05-26 | Stop reason: HOSPADM

## 2022-05-24 RX ORDER — TROSPIUM CHLORIDE 20 MG/1
20 TABLET, FILM COATED ORAL
Status: DISCONTINUED | OUTPATIENT
Start: 2022-05-25 | End: 2022-05-26 | Stop reason: HOSPADM

## 2022-05-24 RX ORDER — BUMETANIDE 1 MG/1
2 TABLET ORAL DAILY
Status: DISCONTINUED | OUTPATIENT
Start: 2022-05-24 | End: 2022-05-26 | Stop reason: HOSPADM

## 2022-05-24 RX ORDER — SENNA AND DOCUSATE SODIUM 50; 8.6 MG/1; MG/1
1 TABLET, FILM COATED ORAL NIGHTLY PRN
Status: DISCONTINUED | OUTPATIENT
Start: 2022-05-24 | End: 2022-05-26 | Stop reason: HOSPADM

## 2022-05-24 RX ORDER — FUROSEMIDE 10 MG/ML
40 INJECTION INTRAMUSCULAR; INTRAVENOUS ONCE
Status: COMPLETED | OUTPATIENT
Start: 2022-05-24 | End: 2022-05-24

## 2022-05-24 RX ORDER — TAMSULOSIN HYDROCHLORIDE 0.4 MG/1
0.8 CAPSULE ORAL DAILY
Status: DISCONTINUED | OUTPATIENT
Start: 2022-05-25 | End: 2022-05-26 | Stop reason: HOSPADM

## 2022-05-24 RX ORDER — POLYETHYLENE GLYCOL 3350 17 G/17G
17 POWDER, FOR SOLUTION ORAL DAILY PRN
Status: DISCONTINUED | OUTPATIENT
Start: 2022-05-24 | End: 2022-05-26 | Stop reason: HOSPADM

## 2022-05-24 RX ORDER — FERROUS SULFATE 325(65) MG
325 TABLET ORAL 2 TIMES DAILY WITH MEALS
Status: DISCONTINUED | OUTPATIENT
Start: 2022-05-24 | End: 2022-05-26 | Stop reason: HOSPADM

## 2022-05-24 RX ADMIN — FUROSEMIDE 40 MG: 10 INJECTION, SOLUTION INTRAMUSCULAR; INTRAVENOUS at 17:55

## 2022-05-24 ASSESSMENT — ENCOUNTER SYMPTOMS
NAUSEA: 0
COUGH: 0
DIARRHEA: 0
VOMITING: 0
ABDOMINAL PAIN: 0
BACK PAIN: 0
CHEST TIGHTNESS: 0
ABDOMINAL DISTENTION: 0
SHORTNESS OF BREATH: 1

## 2022-05-24 NOTE — ED PROVIDER NOTES
[facility] EMERGENCY DEPT  eMERGENCY dEPARTMENT eNCOUnter      Pt Name: Daryle Connors  MRN: [id number]  [place] 1940  Date of evaluation: 5/24/2022  Provider: Johnathon Hernandez Dr       Chief Complaint   Patient presents with    Shortness of Breath     On O2 at home. Worsening shortness of air and fatigue over the last few days          HISTORY OF PRESENT ILLNESS   (Location/Symptom, Timing/Onset,Context/Setting, Quality, Duration, Modifying Factors, Severity)  Note limiting factors. Daryle Connors is a [de-identified] y.o. male with history including CHF, atherosclerosis, pacemaker placement, GERD, bladder cancer, and osteoarthritis who presents to the emergency department complaint of shortness of breath. The patient states over the last 4 to 5 days he has been having worsening shortness of air and fatigue. He went to [employer] but they were out of his normal diuretic Bumex so he could not refill his prescription. Since then, he notes swelling of the lower legs as well as his shortness of breath. He denies any chest pain or chest tightness. He denies any swelling or distention of his abdomen. He denies any fever or chills. He does use a CPAP with oxygen at at home at night for sleep apnea. He checked his oxygen saturation today and it was in the mid 80s so he presented to the ER. [facility]    NursingNotes were reviewed. REVIEW OF SYSTEMS    (2-9 systems for level 4, 10 or more for level 5)     Review of Systems   Constitutional: Negative for chills and fever. Respiratory: Positive for shortness of breath. Negative for cough and chest tightness. Cardiovascular: Negative for chest pain. Gastrointestinal: Negative for abdominal distention, abdominal pain, diarrhea, nausea and vomiting. Musculoskeletal: Negative for back pain and myalgias. Neurological: Negative for dizziness and headaches. All other systems reviewed and are negative.            PAST MEDICALHISTORY     Past Medical History:   Diagnosis Date    Acute liver failure without hepatic coma 10/23/2018    Back pain     \"with tired legs as a result\"    Bladder cancer ([facility] 12/19/2018    Blood circulation, collateral     Carotid arterial disease (HCC)     recent surgery    CKD (chronic kidney disease), 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/2018    Constipation     COPD with acute lower respiratory infection ([facility] 02/24/2021    Epigastric discomfort     GERD (gastroesophageal reflux disease)     Hyperlipidemia     Hypertension     Hypertension     Pacemaker     Palliative care patient 10/23/2018    Pneumonia due to infectious organism 11/06/2018    Primary osteoarthritis of left knee 10/14/2018    PVD (peripheral vascular disease) (HCC)     Tremor     Tremor on Right side x 1-2 weeks per stepdaughter    Type 2 diabetes mellitus with complication, without long-term current use of insulin ([facility] 01/21/2021         SURGICAL HISTORY       Past Surgical History:   Procedure Laterality Date    BACK SURGERY      CARDIAC CATHETERIZATION  03/23/2021    100% RCA    COLONOSCOPY  2007?     CYSTOSCOPY Bilateral 12/19/2018    CYSTOSCOPY, BIOSPY FULGURATION OF BLADDER TUMOR POSSIBLE TURBT, RETROGRADE PYELOGRAM performed by Silke Garcia MD at [facility] COLONOSCOPY FLX DX W/COLLJ SPEC WHEN PFRMD N/A 09/11/2017    Dr Nydia Mackenzie internal hemorrhoids, diverticular disease-HP-No recall (age)   Andie Penn HI REVISE MEDIAN N/CARPAL TUNNEL SURG Left 07/18/2018    OPEN CARPAL TUNNEL RELEASE performed by Dayami Marcano MD at [address] Left 08/28/2018    LEFT CAROTID ENDARTERECTOMY WITH VEIN PATCH ANGIOPLASTY AND COMPLETION ANGIOGRAM performed by Radha Moore MD at [facility]. Left 10/15/2018    LEFT COMPLEX TOTAL KNEE ARTHROPLASTY performed by Dayami Marcano MD at [facility] ENDOSCOPY N/A 11/18/2021    Dr Jerrell Gastelum, Sub prep lg amount of solid and semisolid food/Medication in stomach body/antrum/pylorus, stomach lumen appeared to distended w air insufflation and collapse upon suction,   inadequate exam sugg of gastroparesis, repeat in 10-14 days    UPPER GASTROINTESTINAL ENDOSCOPY N/A 01/06/2022    Dr Delonte Flynn, distal stomach body likely GIST nodule-Submucosal fundic body gastric mucosa, Benign, (-)Hpylori, (-)Sprue    UPPER GASTROINTESTINAL ENDOSCOPY N/A 03/07/2022    Dr Delonte Flynn, EUS submucosal gastric nodule, otherwise normal, 1 year recall    UPPER GASTROINTESTINAL ENDOSCOPY N/A 03/07/2022    Dr Delonte Flynn, W EUS,  nodular area at end of rugal gold in distal stomach body    VASCULAR SURGERY  04/21/2015    Olga BESS Ultrasound guided access of left common femoral artery. Aortogram.Diagnostic right lower extremity arteriogram.Radiologic supervision and interpretation.  VASCULAR SURGERY  01/13/2015    Olga Nation M.D Atherectomy,angioplasty,and stenting of left superficial femoral artery.  VASCULAR SURGERY  03/11/2014    Olga Nation M.D. Ultrasound-guided access of right common femoral artery. Aortogram.Left lower extremity arteriogram.Atherectomy and angioplasty of left superficial femoral artery. Radiologic supervision and interpretation.  VASCULAR SURGERY  01/18/2013    Olga BESS Aortogram.Multistation arteriogram right lower extremity. Laser atherectomy and angioplasty of right superficial femoral artery. Selective catheterization of right tibioperoneal trunk. Angioplasty of peroneal artery and tibioperoneal trunk.  VASCULAR SURGERY  10/30/2018    [facility]. Ultrasound guided cannulation of right internal vein. Placement of right internal jugular vein tunneled dialysis catheter bard equistream xk 23cm tip to cuff    VASCULAR SURGERY  12/17/2018    [facility]. Removal of tunneled dilaysis catheter right internal jugular vein.          CURRENT MEDICATIONS     Previous Medications    AMIODARONE (CORDARONE) 200 MG TABLET    TAKE 1 TABLET BY MOUTH DAILY    ASPIRIN 81 MG EC TABLET    Take 1 tablet by mouth daily    ATORVASTATIN (LIPITOR) 20 MG TABLET    Take 1 tablet by mouth daily    BUMETANIDE (BUMEX) 1 MG TABLET    Take 2 mg by mouth daily     CEPHALEXIN (KEFLEX) 500 MG CAPSULE    Take 1 capsule by mouth 2 times daily for 10 days    FERROUS SULFATE (IRON 325) 325 (65 FE) MG TABLET    Take 1 tablet by mouth 2 times daily (with meals)    FOLIC ACID (FOLVITE) 1 MG TABLET    Take 1 tablet by mouth daily    GLIPIZIDE (GLUCOTROL XL) 10 MG EXTENDED RELEASE TABLET    2.5 mg daily     METOLAZONE (ZAROXOLYN) 2.5 MG TABLET    TAKE 1 TABLET BY MOUTH EVERY DAY AS NEEDED FOR SWELLING    METOPROLOL SUCCINATE (TOPROL XL) 100 MG EXTENDED RELEASE TABLET    Take 1 tablet by mouth daily    MIRABEGRON (MYRBETRIQ) 25 MG TB24    Take 1 tablet by mouth daily    NIFEDIPINE (ADALAT CC) 60 MG EXTENDED RELEASE TABLET    Take 1 tablet by mouth daily    OXYGEN    Inhale 2 L into the lungs continuous    PANTOPRAZOLE (PROTONIX) 20 MG TABLET    Take 40 mg by mouth daily     SENNA PLUS 8.6-50 MG PER TABLET        TAMSULOSIN (FLOMAX) 0.4 MG CAPSULE    Take 2 capsules by mouth daily    TRULANCE 3 MG TABS    TAKE 1 TABLET BY MOUTH DAILY    UNABLE TO FIND    Take 20 mg by mouth daily trosopium    VITAMIN D (ERGOCALCIFEROL) 1.25 MG (55815 UT) CAPS CAPSULE    Take 1 capsule by mouth once a week       ALLERGIES     Eliquis [apixaban] and Promethazine hcl    FAMILY HISTORY       Family History   Problem Relation Age of Onset    Colon Cancer Father     Diabetes Brother     Colon Polyps Neg Hx     Liver Cancer Neg Hx     Liver Disease Neg Hx     Esophageal Cancer Neg Hx     Rectal Cancer Neg Hx     Stomach Cancer Neg Hx           SOCIAL HISTORY       Social History     Socioeconomic History    Marital status:      Spouse name: None    Number of children: None    Years of education: None    Highest education level: None   Occupational History  None   Tobacco Use    Smoking status: Former Smoker     Packs/day: 2.00     Years: 48.00     Pack years: 96.00     Types: Cigarettes     Quit date: 6/3/2003     Years since quittin.9    Smokeless tobacco: Never Used   Vaping Use    Vaping Use: Never used   Substance and Sexual Activity    Alcohol use: Yes     Alcohol/week: 12.0 standard drinks     Types: 12 Glasses of wine per week     Comment: 1 glasses of wine every night    Drug use: No    Sexual activity: Yes     Partners: Female   Other Topics Concern    None   Social History Narrative    None     Social Determinants of Health     Financial Resource Strain:     Difficulty of Paying Living Expenses: Not on file   Food Insecurity:     Worried About Running Out of Food in the Last Year: Not on file    Ankit of Food in the Last Year: Not on file   Transportation Needs:     Lack of Transportation (Medical): Not on file    Lack of Transportation (Non-Medical):  Not on file   Physical Activity:     Days of Exercise per Week: Not on file    Minutes of Exercise per Session: Not on file   Stress:     Feeling of Stress : Not on file   Social Connections:     Frequency of Communication with Friends and Family: Not on file    Frequency of Social Gatherings with Friends and Family: Not on file    Attends Evangelical Services: Not on file    Active Member of [address] [place] [address] or Organizations: Not on file    Attends Club or Organization Meetings: Not on file    Marital Status: Not on file   Intimate Partner Violence:     Fear of Current or Ex-Partner: Not on file    Emotionally Abused: Not on file    Physically Abused: Not on file    Sexually Abused: Not on file   Housing Stability:     Unable to Pay for Housing in the Last Year: Not on file    Number of [place] in the Last Year: Not on file    Unstable Housing in the Last Year: Not on file       SCREENINGS    Dago Coma Scale  Eye Opening: Spontaneous  Best Verbal Response: Oriented  Best Motor Response: Obeys commands  Dago Coma Scale Score: 15        PHYSICAL EXAM    (up to 7 for level 4, 8 or more for level 5)     ED Triage Vitals   BP Temp Temp Source Heart Rate Resp SpO2 Height Weight   05/24/22 1615 05/24/22 1615 05/24/22 1615 05/24/22 1615 05/24/22 1615 05/24/22 1626 -- 05/24/22 1615   (!) 155/76 98.9 °F (37.2 °C) Temporal 60 24 (!) 88 %  213 lb (96.6 kg)       Physical Exam  Vitals and nursing note reviewed. Constitutional:       General: He is not in acute distress. Appearance: He is well-developed. He is obese. He is not ill-appearing, toxic-appearing or diaphoretic. HENT:      Head: Normocephalic and atraumatic. Neck:      Vascular: No JVD. Cardiovascular:      Rate and Rhythm: Normal rate and regular rhythm. Pulses: Normal pulses. Heart sounds: Normal heart sounds. Pulmonary:      Effort: Pulmonary effort is normal. Tachypnea present. No respiratory distress. Breath sounds: Decreased breath sounds present. No wheezing or rhonchi. Comments: On 4 L at 92%  Chest:      Chest wall: No tenderness. Abdominal:      Palpations: Abdomen is soft. Tenderness: There is no abdominal tenderness. Musculoskeletal:      Cervical back: Normal range of motion and neck supple. Right lower leg: No tenderness. Edema (3+) present. Left lower leg: No tenderness. Edema (3+) present. Skin:     General: Skin is warm and dry. Neurological:      General: No focal deficit present. Mental Status: He is alert and oriented to person, place, and time.          DIAGNOSTIC RESULTS     EKG: All EKG's areinterpreted by the Emergency Department Physician who either signs or Co-signs this chart in the absence of a cardiologist.    G interpreted by attending, normal sinus rhythm at a rate of 61, left bundle branch block, prolonged LA at 299, QTc 421, no STEMI or acute ischemia    RADIOLOGY:  Non-plain film images such as CT, Ultrasound and MRI are read by the ashley Devi radiographic images are visualized and preliminarily interpreted bythe emergency physician with the below findings:    XR CHEST PORTABLE   Final Result   1. Appearance favors interstitial edema with developing perihilar and   infrahilar alveolar edema. No consolidation or obvious pleural   effusion. Signed by Dr Isabel Kemp:  Labs Reviewed   CBC WITH AUTO DIFFERENTIAL - Abnormal; Notable for the following components:       Result Value    WBC 11.4 (*)     RBC 4.39 (*)     Hemoglobin 12.7 (*)     Hematocrit 40.9 (*)     MCHC 31.1 (*)     Neutrophils % 84.7 (*)     Lymphocytes % 5.2 (*)     Neutrophils Absolute 9.7 (*)     Lymphocytes Absolute 0.6 (*)     Monocytes Absolute 1.00 (*)     All other components within normal limits   COMPREHENSIVE METABOLIC PANEL W/ REFLEX TO MG FOR LOW K - Abnormal; Notable for the following components:    Glucose 210 (*)     BUN 51 (*)     CREATININE 2.5 (*)     GFR Non- 25 (*)     GFR  30 (*)     All other components within normal limits   TROPONIN - Abnormal; Notable for the following components:    Troponin 0.04 (*)     All other components within normal limits   BRAIN NATRIURETIC PEPTIDE - Abnormal; Notable for the following components:    Pro-BNP 4,513 (*)     All other components within normal limits   RESPIRATORY PANEL, MOLECULAR, WITH COVID-19       All other labs were within normal range or not returned as of this dictation. EMERGENCY DEPARTMENT COURSE and DIFFERENTIAL DIAGNOSIS/MDM:   Vitals:    Vitals:    05/24/22 1615 05/24/22 1626 05/24/22 1715 05/24/22 1730   BP: (!) 155/76  (!) 157/79    Pulse: 60      Resp: 24      Temp: 98.9 °F (37.2 °C)      TempSrc: Temporal      SpO2:  (!) 88%  95%   Weight: 213 lb (96.6 kg)          MDM  Patient is an 41-year-old male presents the ER with complaint of shortness of breath.   On physical exam, he does have bilateral pitting edema of the lower extremities as well as decreased breath sounds. His oxygen was low so he was placed on 4 L via nasal cannula. This does improve the difficulty breathing as well as his oxygen saturation. His EKG does show a left bundle branch that is new compared to previous EKGs that I can find epic. He did have elevation of his troponin. I reached out to cardiologist who suspects that this is more related to his overload versus a STEMI as the patient is not having pain. He will see the patient while inpatient. His BNP was elevated and he also does have signs of fluid overload on his chest x-ray. He was given of Lasix in the ER. He does have chronic elevation of his function on CMP. Panel is negative. He was admitted to hospitalist.  He is agreeable to admission. CONSULTS:  Dr Concepcion Martinez, accepting hospitalist  Dr Luis Garner, Cardiology    FINAL IMPRESSION      1.  Acute on chronic congestive heart failure, unspecified heart failure type [facility])          DISPOSITION/PLAN   DISPOSITION Decision To Admit 05/24/2022 05:51:48 PM    (Please note that portions of this note were completed with a voice recognition program.  Efforts were made to edit thedictations but occasionally words are mis-transcribed.)    KORINA Enrique Cha (electronically signed)     Dori Enrique Cha  05/24/22 [id number]

## 2022-05-24 NOTE — H&P
[place], [facility], [facility] 7    DEPARTMENT OF HOSPITALIST MEDICINE      HISTORY & PHYSICAL:          REASON FOR ADMISSION:  Chief Complaint   Patient presents with    Shortness of Breath     On O2 at home. Worsening shortness of air and fatigue over the last few days         HISTORY OF PRESENT ILLNESS:  Amber Randhawa is an [de-identified] y.o. male. He is a very pleasant gentleman with known CHF who ran out of his diuretics a few days ago and was unable to fill them. He is complaining of being weak and tired for the last few days, getting short of breath and noticing increased swelling in his legs. He came to the ER for evaluation work-up was done which showed fluid overload and CHF exacerbation. Patient was given 1 dose of IV Lasix 40 mg with good diuresis. He is being admitted for medical management, IV diuresis, cardiology evaluation and further work-up.     PAST MEDICAL HISTORY:  Past Medical History:   Diagnosis Date    Acute liver failure without hepatic coma 10/23/2018    Back pain     \"with tired legs as a result\"    Bladder cancer ([address] 12/19/2018    Blood circulation, collateral     Carotid arterial disease (HCC)     recent surgery    CKD (chronic kidney disease), 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/2018    Constipation     COPD with acute lower respiratory infection ([address] 02/24/2021    Epigastric discomfort     GERD (gastroesophageal reflux disease)     Hyperlipidemia     Hypertension     Hypertension     Pacemaker     Palliative care patient 10/23/2018    Pneumonia due to infectious organism 11/06/2018    Primary osteoarthritis of left knee 10/14/2018    [facility]VD [facility]per[facility] vascular disease) ([facility])     Tremor     Tremor on Right side x 1-2 weeks per stepdaughter    Type 2 diabetes mellitus with complication, without long-term current use of insulin ([address] 01/21/2021         PAST SURGICAL HISTORY:  Past Surgical History:   Procedure Laterality Date    BACK SURGERY      CARDIAC CATHETERIZATION 03/23/2021    100% RCA    COLONOSCOPY  2007?  CYSTOSCOPY Bilateral 12/19/2018    CYSTOSCOPY, BIOSPY FULGURATION OF BLADDER TUMOR POSSIBLE TURBT, RETROGRADE PYELOGRAM performed by Jannet Dominique MD at [address] COLONOSCOPY FLX DX W/COLLJ SPEC WHEN PFRMD N/A 09/11/2017    Dr Gunjan Alston internal hemorrhoids, diverticular disease-HP-No recall (age)   Brigida Vitaly NC REVISE MEDIAN N/CARPAL TUNNEL SURG Left 07/18/2018    OPEN CARPAL TUNNEL RELEASE performed by Juan Carlos Joiner MD at [address] Left 08/28/2018    LEFT CAROTID ENDARTERECTOMY WITH VEIN PATCH ANGIOPLASTY AND COMPLETION ANGIOGRAM performed by Wilmar Osorio MD at [facility] Left 10/15/2018    LEFT COMPLEX TOTAL KNEE ARTHROPLASTY performed by Juan Carlos Joiner MD at [address] [facility] ENDOSCOPY N/A 11/18/2021    Dr Bernadette De, Sub prep lg amount of solid and semisolid food/Medication in stomach body/antrum/pylorus, stomach lumen appeared to distended w air insufflation and collapse upon suction,   inadequate exam sugg of gastroparesis, repeat in 10-14 days    UPPER GASTROINTESTINAL ENDOSCOPY N/A 01/06/2022    Dr Bernadette De, distal stomach body likely GIST nodule-Submucosal fundic body gastric mucosa, Benign, (-)Hpylori, (-)Sprue    UPPER GASTROINTESTINAL ENDOSCOPY N/A 03/07/2022    Dr Bernadette De, EUS submucosal gastric nodule, otherwise normal, 1 year recall    UPPER GASTROINTESTINAL ENDOSCOPY N/A 03/07/2022    Dr Bernadette De, W EUS,  nodular area at end of rugal gold in distal stomach body    VASCULAR SURGERY  04/21/2015    Finesse BESS Ultrasound guided access of left common femoral artery. Aortogram.Diagnostic right lower extremity arteriogram.Radiologic supervision and interpretation.  VASCULAR SURGERY  01/13/2015    Finesse Varner M.D Atherectomy,angioplasty,and stenting of left superficial femoral artery.     VASCULAR SURGERY  2014    Elmer Hardy M.D. Ultrasound-guided access of right common femoral artery. Aortogram.Left lower extremity arteriogram.Atherectomy and angioplasty of left superficial femoral artery. Radiologic supervision and interpretation.  VASCULAR SURGERY  2013    Elmer BESS Aortogram.Multistation arteriogram right lower extremity. Laser atherectomy and angioplasty of right superficial femoral artery. Selective catheterization of right tibioperoneal trunk. Angioplasty of peroneal artery and tibioperoneal trunk.  VASCULAR SURGERY  10/30/2018    [facility]. Ultrasound guided cannulation of right internal vein. Placement of right internal jugular vein tunneled dialysis catheter bard equistream xk 23cm tip to cuff    VASCULAR SURGERY  2018    [facility]. Removal of tunneled dilaysis catheter right internal jugular vein. SOCIAL HISTORY:  Social History     Socioeconomic History    Marital status:      Spouse name: None    Number of children: None    Years of education: None    Highest education level: None   Occupational History    None   Tobacco Use    Smoking status: Former Smoker     Packs/day: 2.00     Years: 48.00     Pack years: 96.00     Types: Cigarettes     Quit date: 6/3/2003     Years since quittin.9    Smokeless tobacco: Never Used   Vaping Use    Vaping Use: Never used   Substance and Sexual Activity    Alcohol use:  Yes     Alcohol/week: 12.0 standard drinks     Types: 12 Glasses of wine per week     Comment: 1 glasses of wine every night    Drug use: No    Sexual activity: Yes     Partners: Female   Other Topics Concern    None   Social History Narrative    None     Social Determinants of Health     Financial Resource Strain:     Difficulty of Paying Living Expenses: Not on file   Food Insecurity:     Worried About Running Out of Food in the Last Year: Not on file    Ankit of Food in the Last Year: Not on file   Transportation Needs:     Lack of Transportation (Medical): Not on file    Lack of Transportation (Non-Medical): Not on file   Physical Activity:     Days of Exercise per Week: Not on file    Minutes of Exercise per Session: Not on file   Stress:     Feeling of Stress : Not on file   Social Connections:     Frequency of Communication with Friends and Family: Not on file    Frequency of Social Gatherings with Friends and Family: Not on file    Attends Christianity Services: Not on file    Active Member of Clubs or Organizations: Not on file    Attends Club or Organization Meetings: Not on file    Marital Status: Not on file   Intimate Partner Violence:     Fear of Current or Ex-Partner: Not on file    Emotionally Abused: Not on file    Physically Abused: Not on file    Sexually Abused: Not on file   Housing Stability:     Unable to Pay for Housing in the Last Year: Not on file    Number of [place] in the Last Year: Not on file    Unstable Housing in the Last Year: Not on file        FAMILY HISTORY:  Family History   Problem Relation Age of Onset    Colon Cancer Father     Diabetes Brother     Colon Polyps Neg Hx     Liver Cancer Neg Hx     Liver Disease Neg Hx     Esophageal Cancer Neg Hx     Rectal Cancer Neg Hx     Stomach Cancer Neg Hx          ALLERGIES:  Allergies   Allergen Reactions    Eliquis [Apixaban] Other (See Comments)     \"almost bled to death\"    Promethazine Hcl Other (See Comments)     He became encephalopathic for several hours and was not responsive. PRIOR TO ADMISSION MEDS:  Not in a hospital admission.      REVIEW OF SYSTEMS:  Constitutional:  No fevers, chills, nausea, vomiting, + tiredness & fatigue   Head:  No head injury, facial trauma   Eyes:  No acute visual changes, exudate, trauma   Ears:  No acute hearing loss, earaches   Nose: No nasal discharge, epistaxis   Neck: No new hoarseness, voice change, or new masses   Lungs:   No hemoptysis, pleurisy, + SOB   Heart:  No chest pressure with exertion, palpitations,    Abdomen:   No new masses, no bright red blood per rectum   Extremities:  + difficulty ambulation due to weakness, ++ bilateral leg swelling   Skin: + mild redness over lower legs, no rashes or lesions   Neurologic: No new motor or sensory changes     14 point review of systems addressed with patient which is essentially negative except as specifically addressed above:    PHYSICAL EXAM:  BP (!) 157/79   Pulse 60   Temp 98.9 °F (37.2 °C) (Temporal)   Resp 24   Wt 213 lb (96.6 kg)   SpO2 95%   BMI 28.89 kg/m²   No intake/output data recorded.       PHYSICAL EXAMINATION:    Vital Signs: Please see the chart   BENITO:  Awake, alert, oriented x 3, patient appears tired and fatigued   Head/Eyes:  Normocephalic, atraumatic, EOMI and PERRLA bilaterally   ENT: Moist mucous membranes, nasal passages clear   Neck: Supple, full range of motion, no carotid bruit, trachea midline   Respiratory:   Bilateral decreased air entry in both lung fields, scattered bilateral rales, coarse bilateral breath sounds   Cardiovascular:  Regular rate and rhythm, S1+S2+0, no murmurs/rubs   Urology: No bilateral CVA tenderness, no suprapubic tenderness   Abdomen:   Soft, non-tender, bowel sounds +ve, no organomegaly   Muscle/Joints: Moves all, full range of motion, no muscle spasms   Extremities: No clubbing, no cyanosis, no calf tenderness, ++ bilateral lower leg pitting edema   Pulses: 2+ bilaterally, symmetrical   Skin: Warm, dry, no pallor/cyanosis/jaundice, + stasis dermatitis bilateral legs   Neurologic: Awake, alert, oriented x 3, cranial nerves II-XII intact, no focal neurological deficits, sensory system intact   Psychiatric: Normal mood, non-suicidal         LABORATORY DATA:    CBC:  Recent Labs     05/24/22  1700   WBC 11.4*   HGB 12.7*   HCT 40.9*        BMP:  Recent Labs     05/24/22  1700      K 4.1   CL 99   CO2 23   BUN 51*   CREATININE 2.5*   CALCIUM 9.9     Recent Labs     05/24/22 1700 AST 26   ALT 26   BILITOT 0.9   ALKPHOS 118     Coag Panel: No results for input(s): INR, PROTIME, APTT in the last 72 hours. Cardiac Enzymes:   Recent Labs     05/24/22  1700   TROPONINI 0.04*     ABGs:  Lab Results   Component Value Date    PHART 7.450 11/24/2021    PO2ART 72.0 11/24/2021    VHW9NTU 40.0 11/24/2021     Urinalysis:  Lab Results   Component Value Date    NITRU Negative 05/20/2022    WBCUA 1 11/23/2021    BACTERIA 2+ 05/20/2022    BACTERIA NEGATIVE 11/23/2021    RBCUA 0 05/20/2022    RBCUA 1 11/23/2021    BLOODU Positive 05/20/2022    SPECGRAV 1.025 05/20/2022    GLUCOSEU NEG 05/20/2022     A1C: No results for input(s): LABA1C in the last 72 hours. ABG:No results for input(s): PHART, ZXX7ATK, PO2ART, VHT2BOF, BEART, HGBAE, Z1HPGGWE, CARBOXHGBART in the last 72 hours. EKG:   Please see chart      IMAGING:  XR CHEST PORTABLE    Result Date: 5/24/2022  1. Appearance favors interstitial edema with developing perihilar and infrahilar alveolar edema. No consolidation or obvious pleural effusion. Signed by Dr Sylvia Tejeda and Plan:    Principal Problem:    Acute on chronic systolic (congestive) heart failure (HCC)  Active Problems:    Long term current use of antiarrhythmic drug    Back pain    Hyperlipidemia    PVD (peripheral vascular disease) (HCC)    Urge incontinence of urine    Arthritis of knee    Essential hypertension    GERD (gastroesophageal reflux disease)    Benign prostatic hyperplasia with lower urinary tract symptoms    Chronic kidney disease    Vitamin D deficiency    Anemia    Pacemaker  Resolved Problems:    * No resolved hospital problems.  *       Admit patient to PCU under full inpatient status  Continuous cardiac telemetry monitoring  Patient given Lasix 40 mg IV x1 dose  Continue with his regular dose of diuretics in the morning  Strict I's and O's  Daily weights  Patient has minimally elevated troponin of 0.04 without any chest pain  Monitor cardiac enzymes ×3  Last echo reviewed from 11/21 which shows preserved left ventricular function and EF around 60%  Continue with cardiac diet  Cardiology consultation given for evaluation, further treatment recommendations and work up      Chronic medical issues . .. Continue with home meds. Monitor patient closely while admitted. Advised very close f/u with patient's PCP as an outpatient to address chronic medical issues. Repeat labs in a.m. Electrolyte replacement as per protocol. Patient will be monitored very closely on the floor. Further recommendations as per the hospital course. Patient's management will be taken over by our [facility] Hospitalist Team in am.    Patient  is on DVT prophylaxis  Current medications reviewed  Lab work reviewed  Radiology/Chest x-ray films reviewed  Discussed with the nurse and addressed all questions/concerns  Discussed with Patient and/or Family at the bedside in detail . .. they verbalize understanding and agree with the management plan. Attestation:  Inpatient status is used for patients with an expected LOS extending past two midnights due to medical therapy and/or critical care needs  . .. all other patients are placed under OBServation status. Dc Rivera MD  6:47 PM 5/24/2022      DISCLAIMER: This note was created with electronic voice recognition which does have occasional errors. If you have any questions regarding the content within the note please do not hesitate to contact me. .. Thanks.

## 2022-05-25 LAB
ANION GAP SERPL CALCULATED.3IONS-SCNC: 16 MMOL/L (ref 7–19)
BASE EXCESS ARTERIAL: -1.9 MMOL/L (ref -2–2)
BASOPHILS ABSOLUTE: 0 K/UL (ref 0–0.2)
BASOPHILS RELATIVE PERCENT: 0.3 % (ref 0–1)
BUN BLDV-MCNC: 52 MG/DL (ref 8–23)
CALCIUM SERPL-MCNC: 9.6 MG/DL (ref 8.8–10.2)
CARBOXYHEMOGLOBIN ARTERIAL: 1.4 % (ref 0–5)
CHLORIDE BLD-SCNC: 103 MMOL/L (ref 98–111)
CHOLESTEROL, TOTAL: 95 MG/DL (ref 160–199)
CO2: 23 MMOL/L (ref 22–29)
CREAT SERPL-MCNC: 2.5 MG/DL (ref 0.5–1.2)
D DIMER: 1.27 UG/ML FEU (ref 0–0.48)
EOSINOPHILS ABSOLUTE: 0 K/UL (ref 0–0.6)
EOSINOPHILS RELATIVE PERCENT: 0.3 % (ref 0–5)
GFR AFRICAN AMERICAN: 30
GFR NON-AFRICAN AMERICAN: 25
GLUCOSE BLD-MCNC: 193 MG/DL (ref 74–109)
HCO3 ARTERIAL: 24.7 MMOL/L (ref 22–26)
HCT VFR BLD CALC: 35.8 % (ref 42–52)
HDLC SERPL-MCNC: 48 MG/DL (ref 55–121)
HEMOGLOBIN, ART, EXTENDED: 11.8 G/DL (ref 14–18)
HEMOGLOBIN: 11.2 G/DL (ref 14–18)
IMMATURE GRANULOCYTES #: 0.2 K/UL
LDL CHOLESTEROL CALCULATED: 34 MG/DL
LV EF: 53 %
LVEF MODALITY: NORMAL
LYMPHOCYTES ABSOLUTE: 0.5 K/UL (ref 1.1–4.5)
LYMPHOCYTES RELATIVE PERCENT: 4.4 % (ref 20–40)
MCH RBC QN AUTO: 29.2 PG (ref 27–31)
MCHC RBC AUTO-ENTMCNC: 31.3 G/DL (ref 33–37)
MCV RBC AUTO: 93.2 FL (ref 80–94)
METHEMOGLOBIN ARTERIAL: 0.9 %
MONOCYTES ABSOLUTE: 1.1 K/UL (ref 0–0.9)
MONOCYTES RELATIVE PERCENT: 9.6 % (ref 0–10)
NEUTROPHILS ABSOLUTE: 9.7 K/UL (ref 1.5–7.5)
NEUTROPHILS RELATIVE PERCENT: 83.8 % (ref 50–65)
O2 CONTENT ARTERIAL: 14.6 ML/DL
O2 SAT, ARTERIAL: 88.1 %
O2 THERAPY: ABNORMAL
PCO2 ARTERIAL: 49 MMHG (ref 35–45)
PDW BLD-RTO: 13.7 % (ref 11.5–14.5)
PH ARTERIAL: 7.31 (ref 7.35–7.45)
PLATELET # BLD: 135 K/UL (ref 130–400)
PMV BLD AUTO: 10.3 FL (ref 9.4–12.4)
PO2 ARTERIAL: 57 MMHG (ref 80–100)
POTASSIUM SERPL-SCNC: 4.1 MMOL/L (ref 3.5–5)
POTASSIUM, WHOLE BLOOD: 3.8
RBC # BLD: 3.84 M/UL (ref 4.7–6.1)
SODIUM BLD-SCNC: 142 MMOL/L (ref 136–145)
TRIGL SERPL-MCNC: 64 MG/DL (ref 0–149)
TROPONIN: 0.04 NG/ML (ref 0–0.03)
TROPONIN: 0.04 NG/ML (ref 0–0.03)
WBC # BLD: 11.6 K/UL (ref 4.8–10.8)

## 2022-05-25 PROCEDURE — 6360000002 HC RX W HCPCS: Performed by: HOSPITALIST

## 2022-05-25 PROCEDURE — 2580000003 HC RX 258: Performed by: HOSPITALIST

## 2022-05-25 PROCEDURE — 80061 LIPID PANEL: CPT

## 2022-05-25 PROCEDURE — 6370000000 HC RX 637 (ALT 250 FOR IP): Performed by: HOSPITALIST

## 2022-05-25 PROCEDURE — 99223 1ST HOSP IP/OBS HIGH 75: CPT | Performed by: INTERNAL MEDICINE

## 2022-05-25 PROCEDURE — 6370000000 HC RX 637 (ALT 250 FOR IP): Performed by: INTERNAL MEDICINE

## 2022-05-25 PROCEDURE — 82803 BLOOD GASES ANY COMBINATION: CPT

## 2022-05-25 PROCEDURE — 85025 COMPLETE CBC W/AUTO DIFF WBC: CPT

## 2022-05-25 PROCEDURE — 94640 AIRWAY INHALATION TREATMENT: CPT

## 2022-05-25 PROCEDURE — 2140000000 HC CCU INTERMEDIATE R&B

## 2022-05-25 PROCEDURE — 85379 FIBRIN DEGRADATION QUANT: CPT

## 2022-05-25 PROCEDURE — 2700000000 HC OXYGEN THERAPY PER DAY

## 2022-05-25 PROCEDURE — 80048 BASIC METABOLIC PNL TOTAL CA: CPT

## 2022-05-25 PROCEDURE — 36600 WITHDRAWAL OF ARTERIAL BLOOD: CPT

## 2022-05-25 PROCEDURE — 84484 ASSAY OF TROPONIN QUANT: CPT

## 2022-05-25 PROCEDURE — 36415 COLL VENOUS BLD VENIPUNCTURE: CPT

## 2022-05-25 PROCEDURE — 94660 CPAP INITIATION&MGMT: CPT

## 2022-05-25 PROCEDURE — 93306 TTE W/DOPPLER COMPLETE: CPT

## 2022-05-25 RX ORDER — ENOXAPARIN SODIUM 100 MG/ML
30 INJECTION SUBCUTANEOUS
Status: DISCONTINUED | OUTPATIENT
Start: 2022-05-25 | End: 2022-05-26 | Stop reason: HOSPADM

## 2022-05-25 RX ORDER — HEPARIN SODIUM 5000 [USP'U]/ML
5000 INJECTION, SOLUTION INTRAVENOUS; SUBCUTANEOUS EVERY 8 HOURS SCHEDULED
Status: DISCONTINUED | OUTPATIENT
Start: 2022-05-25 | End: 2022-05-25 | Stop reason: ALTCHOICE

## 2022-05-25 RX ORDER — IPRATROPIUM BROMIDE AND ALBUTEROL SULFATE 2.5; .5 MG/3ML; MG/3ML
1 SOLUTION RESPIRATORY (INHALATION)
Status: DISCONTINUED | OUTPATIENT
Start: 2022-05-25 | End: 2022-05-26 | Stop reason: HOSPADM

## 2022-05-25 RX ORDER — IPRATROPIUM BROMIDE AND ALBUTEROL SULFATE 2.5; .5 MG/3ML; MG/3ML
1 SOLUTION RESPIRATORY (INHALATION)
Status: DISCONTINUED | OUTPATIENT
Start: 2022-05-25 | End: 2022-05-25

## 2022-05-25 RX ORDER — FUROSEMIDE 10 MG/ML
20 INJECTION INTRAMUSCULAR; INTRAVENOUS DAILY
Status: COMPLETED | OUTPATIENT
Start: 2022-05-25 | End: 2022-05-26

## 2022-05-25 RX ORDER — FUROSEMIDE 10 MG/ML
40 INJECTION INTRAMUSCULAR; INTRAVENOUS DAILY
Status: DISCONTINUED | OUTPATIENT
Start: 2022-05-25 | End: 2022-05-25

## 2022-05-25 RX ADMIN — HEPARIN SODIUM 5000 UNITS: 5000 INJECTION INTRAVENOUS; SUBCUTANEOUS at 05:05

## 2022-05-25 RX ADMIN — GLIPIZIDE 10 MG: 5 TABLET ORAL at 09:15

## 2022-05-25 RX ADMIN — ENOXAPARIN SODIUM 30 MG: 100 INJECTION SUBCUTANEOUS at 15:06

## 2022-05-25 RX ADMIN — SODIUM CHLORIDE, PRESERVATIVE FREE 10 ML: 5 INJECTION INTRAVENOUS at 00:54

## 2022-05-25 RX ADMIN — METOLAZONE 5 MG: 2.5 TABLET ORAL at 09:16

## 2022-05-25 RX ADMIN — FERROUS SULFATE TAB 325 MG (65 MG ELEMENTAL FE) 325 MG: 325 (65 FE) TAB at 09:16

## 2022-05-25 RX ADMIN — IPRATROPIUM BROMIDE AND ALBUTEROL SULFATE 1 AMPULE: 2.5; .5 SOLUTION RESPIRATORY (INHALATION) at 07:31

## 2022-05-25 RX ADMIN — AMIODARONE HYDROCHLORIDE 200 MG: 200 TABLET ORAL at 20:04

## 2022-05-25 RX ADMIN — IPRATROPIUM BROMIDE AND ALBUTEROL SULFATE 1 AMPULE: 2.5; .5 SOLUTION RESPIRATORY (INHALATION) at 00:51

## 2022-05-25 RX ADMIN — FUROSEMIDE 20 MG: 10 INJECTION, SOLUTION INTRAMUSCULAR; INTRAVENOUS at 17:44

## 2022-05-25 RX ADMIN — AMIODARONE HYDROCHLORIDE 200 MG: 200 TABLET ORAL at 09:16

## 2022-05-25 RX ADMIN — BUMETANIDE 2 MG: 1 TABLET ORAL at 09:15

## 2022-05-25 RX ADMIN — ASPIRIN 81 MG: 81 TABLET, COATED ORAL at 09:15

## 2022-05-25 RX ADMIN — TROSPIUM CHLORIDE 20 MG: 20 TABLET, FILM COATED ORAL at 09:16

## 2022-05-25 RX ADMIN — PANTOPRAZOLE SODIUM 40 MG: 40 TABLET, DELAYED RELEASE ORAL at 09:16

## 2022-05-25 RX ADMIN — FERROUS SULFATE TAB 325 MG (65 MG ELEMENTAL FE) 325 MG: 325 (65 FE) TAB at 17:44

## 2022-05-25 RX ADMIN — ACETAMINOPHEN 650 MG: 325 TABLET ORAL at 20:06

## 2022-05-25 RX ADMIN — FOLIC ACID 1 MG: 1 TABLET ORAL at 09:15

## 2022-05-25 RX ADMIN — TAMSULOSIN HYDROCHLORIDE 0.8 MG: 0.4 CAPSULE ORAL at 09:16

## 2022-05-25 RX ADMIN — IPRATROPIUM BROMIDE AND ALBUTEROL SULFATE 1 AMPULE: 2.5; .5 SOLUTION RESPIRATORY (INHALATION) at 18:29

## 2022-05-25 RX ADMIN — ATORVASTATIN CALCIUM 20 MG: 40 TABLET, FILM COATED ORAL at 09:15

## 2022-05-25 RX ADMIN — SODIUM CHLORIDE, PRESERVATIVE FREE 10 ML: 5 INJECTION INTRAVENOUS at 09:17

## 2022-05-25 RX ADMIN — NIFEDIPINE 60 MG: 60 TABLET, EXTENDED RELEASE ORAL at 09:16

## 2022-05-25 RX ADMIN — IPRATROPIUM BROMIDE AND ALBUTEROL SULFATE 1 AMPULE: 2.5; .5 SOLUTION RESPIRATORY (INHALATION) at 11:08

## 2022-05-25 RX ADMIN — ERGOCALCIFEROL 50000 UNITS: 1.25 CAPSULE ORAL at 09:15

## 2022-05-25 RX ADMIN — METOPROLOL SUCCINATE 100 MG: 50 TABLET, EXTENDED RELEASE ORAL at 09:16

## 2022-05-25 RX ADMIN — SODIUM CHLORIDE, PRESERVATIVE FREE 10 ML: 5 INJECTION INTRAVENOUS at 20:06

## 2022-05-25 RX ADMIN — TROSPIUM CHLORIDE 20 MG: 20 TABLET, FILM COATED ORAL at 17:44

## 2022-05-25 ASSESSMENT — ENCOUNTER SYMPTOMS
VOMITING: 0
EYES NEGATIVE: 1
NAUSEA: 0
RESPIRATORY NEGATIVE: 1
GASTROINTESTINAL NEGATIVE: 1
SHORTNESS OF BREATH: 0
DIARRHEA: 0

## 2022-05-25 NOTE — PROGRESS NOTES
Dr. Emily Rocha called at [phone] regarding pt shallow respirations/ difficulty breathing. Pt is on 3-4L nasal cannula with O2 sat in between 89-91%. Pt wears CPAP at night but he left a home. He does not wear oxygen on days at home per pt. Dr. Rachid Hampton ordered ABG's first and breathing treatment later. Will continue to monitor.

## 2022-05-25 NOTE — PROGRESS NOTES
I asked about his med allergies to eliquis and phenergan. Pt said he is allergic to eliquis, but  not sure about phenergan. Phenergan is also listed under his drug allergy. I went over his home med which was verified by the ER ish. ER nurse told me that his wife took the med list with her and will be back tomorrow. pt wife is  a retired nurse per pt. Will pass it along. show

## 2022-05-25 NOTE — ACP (ADVANCE CARE PLANNING)
Advance Care Planning     Advance Care Planning Activator (Inpatient)  Conversation Note      Date of ACP Conversation: 5/25/2022     Conversation Conducted with: Pt:  Kylah Redman    ACP Activator: Mariangel Bowman RN        Health Care Decision Maker:     Current Designated Health Care Decision Maker:     Primary Decision Maker: Pola Connors - Child - [phone]    Secondary Decision Maker: Donny Nielsen - Spouse - [phone]    Secondary Decision Maker: Corrieonette Buerger - Child - [phone]      Care Preferences    Ventilation: \"If you were in your present state of health and suddenly became very ill and were unable to breathe on your own, what would your preference be about the use of a ventilator (breathing machine) if it were available to you? \"      Would the patient desire the use of ventilator (breathing machine)?:   YES          Resuscitation  \"CPR works best to restart the heart when there is a sudden event, like a heart attack, in someone who is otherwise healthy. Unfortunately, CPR does not typically restart the heart for people who have serious health conditions or who are very sick. \"    \"In the event your heart stopped as a result of an underlying serious health condition, would you want attempts to be made to restart your heart (answer \"yes\" for attempt to resuscitate) or would you prefer a natural death (answer \"no\" for do not attempt to resuscitate)? \"    YES           Conversation Outcomes:  [x] ACP discussion completed  [x] Existing advance directive reviewed with patient; no changes to patient's previously recorded wishes.

## 2022-05-25 NOTE — PROGRESS NOTES
Results for Blanca Andersen (MRN [id number]) as of 5/25/2022 00:24   Ref.  Range 5/25/2022 00:21   O2 Therapy Unknown Unknown   Hemoglobin, Art, Extended Latest Ref Range: 14.0 - 18.0 g/dL 11.8 (L)   pH, Arterial Latest Ref Range: 7.350 - 7.450  7.310 (L)   pCO2, Arterial Latest Ref Range: 35.0 - 45.0 mmHg 49.0 (H)   pO2, Arterial Latest Ref Range: 80.0 - 100.0 mmHg 57.0 (L)   HCO3, Arterial Latest Ref Range: 22.0 - 26.0 mmol/L 24.7   Base Excess, Arterial Latest Ref Range: -2.0 - 2.0 mmol/L -1.9   O2 Sat, Arterial Latest Ref Range: >92 % 88.1 (L)   O2 Content, Arterial Latest Ref Range: Not Established mL/dL 14.6   Methemoglobin, Arterial Latest Ref Range: <1.5 % 0.9   Carboxyhgb, Arterial Latest Ref Range: 0.0 - 5.0 % 1.4   3.5l/m rr at+

## 2022-05-25 NOTE — CONSULTS
Palliative Care:    Known to Palliative Care and followed by outpt Palliative Care NP. Pleasant [de-identified] y.o. male with history of CHF, atherosclerosis, pacemaker placement, GERD, bladder cancer, and osteoarthritis. Pt presents to ED with increasing shortness of breath and fatigue over several days. He states he got in trouble when he was unable to get his diuretic filled at [facility]. He received IV Lasix. Pt is resting in bed, had been on CPAP but now comfortable on room air. Pt's only complaint at this time is \"my same old problem\" he has incontinence and wearing briefs. Cardiology and Nephrology are consulted and following. Reviewed life at home. Pt lives with his wife. He does not require any DME other than his CPAP. His current complaint is that he is hungry. His nurse arrives and getting a diet order placed for pt.        Past Medical History:        Past Medical History:   Diagnosis Date    Acute liver failure without hepatic coma 10/23/2018    Back pain     \"with tired legs as a result\"    Bladder cancer ([address] 12/19/2018    Blood circulation, collateral     Carotid arterial disease (HCC)     recent surgery    CKD (chronic kidney disease), 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/2018    Constipation     COPD with acute lower respiratory infection ([address] 02/24/2021    Epigastric discomfort     GERD (gastroesophageal reflux disease)     Hyperlipidemia     Hypertension     Hypertension     Pacemaker     Palliative care patient 10/23/2018    Pneumonia due to infectious organism 11/06/2018    Primary osteoarthritis of left knee 10/14/2018    [facility]VD [facility]) [facility]     Tremor     Tremor on Right side x 1-2 weeks per stepdaughter    Type 2 diabetes mellitus with complication, without long-term current use of insulin ([address] 01/21/2021       Advance Directives:    Full code  Verified his wishes and completed ACP  Pt has a living will on file     Pain/Other Symptoms:    Denies pain at this time    Activity:     As tolerated        Plan:   Continue medical management and monitoring. Patient/family discussion r/t goals:  Pt's goal is to return home with his wife. Supportive Care out pt will follow up when discharged. Palliative Care will support and follow POC.      Electronically signed by Maury Sandhoff, RN on 5/25/2022 at 3:12 PM

## 2022-05-25 NOTE — PROGRESS NOTES
Kerin Duane arrived to room # [phone]. Presented with: CHF  Mental Status: Patient is oriented, alert, coherent, logical, thought processes intact and able to concentrate and follow conversation. Vitals:    05/24/22 2315   BP: (!) 151/65   Pulse: 76   Resp: 20   Temp: 97.3 °F (36.3 °C)   SpO2: 91%     Patient safety contract and falls prevention contract reviewed with patient Yes. Oriented Patient to room. Call light within reach. Yes. Needs, issues or concerns expressed at this time: yes, .       Electronically signed by Bakari Kern RN on 5/25/2022 at 12:27 AM

## 2022-05-25 NOTE — PROGRESS NOTES
4 Eyes Skin Assessment    Taylor Rossi is being assessed upon: Admission    I agree that I, Amee Castellon, RN, along with Jesús Alonzo RN (either 2 RN's or 1 LPN and 1 RN) have performed a thorough Head to Toe Skin Assessment on the patient. ALL assessment sites listed below have been assessed. Areas assessed by both nurses:     [x]   Head, Face, and Ears   [x]   Shoulders, Back, and Chest  [x]   Arms, Elbows, and Hands   [x]   Coccyx, Sacrum, and Ischium  [x]   Legs, Feet, and Heels    Does the Patient have Skin Breakdown?  No    Ángel Prevention initiated: No  Wound Care Orders initiated: No    WO nurse consulted for Pressure Injury (Stage 3,4, Unstageable, DTI, NWPT, and Complex wounds) and New or Established Ostomies: No        Primary Nurse eSignature: Amee Castellon RN on 5/25/2022 at 12:28 AM      Co-Signer eSignature: Electronically signed by Jesús Alonzo RN on 5/25/22 at 1:06 AM CDT

## 2022-05-25 NOTE — PROGRESS NOTES
red blood per rectum   Extremities:  + difficulty ambulation due to weakness, +2 bilateral lower leg swelling    Neurologic: No new motor or sensory changes     14 point review of systems addressed with patient which is essentially negative except as specifically addressed above:    PAST MEDICAL HISTORY:  Past Medical History:   Diagnosis Date    Acute liver failure without hepatic coma 10/23/2018    Back pain     \"with tired legs as a result\"    Bladder cancer ([facility] 12/19/2018    Blood circulation, collateral     Carotid arterial disease (HCC)     recent surgery    CKD (chronic kidney disease), 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/2018    Constipation     COPD with acute lower respiratory infection ([facility] [address] 02/24/2021    Epigastric discomfort     GERD (gastroesophageal reflux disease)     Hyperlipidemia     Hypertension     Hypertension     Pacemaker     Palliative care patient 10/23/2018    Pneumonia due to infectious organism 11/06/2018    Primary osteoarthritis of left knee 10/14/2018    PVD (peripheral vascular disease) ([facility] [address] [facility]     Tremor     Tremor on Right side x 1-2 weeks per stepdaughter    Type 2 diabetes mellitus with complication, without long-term current use of insulin ([facility] [address] 01/21/2021         PAST SURGICAL HISTORY:  Past Surgical History:   Procedure Laterality Date    BACK SURGERY      CARDIAC CATHETERIZATION  03/23/2021    100% RCA    COLONOSCOPY  2007?     CYSTOSCOPY Bilateral 12/19/2018    CYSTOSCOPY, BIOSPY FULGURATION OF BLADDER TUMOR POSSIBLE TURBT, RETROGRADE PYELOGRAM performed by Qing Greene MD at [address] COLONOSCOPY FLX DX W/COLLJ SPEC WHEN PFRMD N/A 09/11/2017    Dr Ever Bermudez internal hemorrhoids, diverticular disease-HP-No recall (age)   Art Self [place] REVISE MEDIAN N/CARPAL TUNNEL SURG Left 07/18/2018    OPEN CARPAL TUNNEL RELEASE performed by Trevor Jiang MD at [address] Left 08/28/2018    LEFT CAROTID ENDARTERECTOMY WITH VEIN PATCH ANGIOPLASTY AND COMPLETION ANGIOGRAM performed by Ganga Zhang MD at [facility] Left 10/15/2018    LEFT COMPLEX TOTAL KNEE ARTHROPLASTY performed by Shawn Cortes MD at [address] [place] [address] GASTROINTESTINAL ENDOSCOPY N/A 11/18/2021    Dr Evangelina Rodriguez, Sub prep lg amount of solid and semisolid food/Medication in stomach body/antrum/pylorus, stomach lumen appeared to distended w air insufflation and collapse upon suction,   inadequate exam sugg of gastroparesis, repeat in 10-14 days    UPPER GASTROINTESTINAL ENDOSCOPY N/A 01/06/2022    Dr Evangelina Rodriguez, distal stomach body likely GIST nodule-Submucosal fundic body gastric mucosa, Benign, (-)Hpylori, (-)Sprue    UPPER GASTROINTESTINAL ENDOSCOPY N/A 03/07/2022    Dr Evangelina Rodriguez, EUS submucosal gastric nodule, otherwise normal, 1 year recall    UPPER GASTROINTESTINAL ENDOSCOPY N/A 03/07/2022    Dr Evangelina Rodriguez, W EUS,  nodular area at end of rugal gold in distal stomach body    VASCULAR SURGERY  04/21/2015    Usha BESS Ultrasound guided access of left common femoral artery. Aortogram.Diagnostic right lower extremity arteriogram.Radiologic supervision and interpretation.  VASCULAR SURGERY  01/13/2015    Usha Garcia M.D Atherectomy,angioplasty,and stenting of left superficial femoral artery.  VASCULAR SURGERY  03/11/2014    Usha Garcia M.D. Ultrasound-guided access of right common femoral artery. Aortogram.Left lower extremity arteriogram.Atherectomy and angioplasty of left superficial femoral artery. Radiologic supervision and interpretation.  VASCULAR SURGERY  01/18/2013    Usha BESS Aortogram.Multistation arteriogram right lower extremity. Laser atherectomy and angioplasty of right superficial femoral artery. Selective catheterization of right tibioperoneal trunk. Angioplasty of peroneal artery and tibioperoneal trunk.  VASCULAR SURGERY  10/30/2018    [facility]. Ultrasound guided cannulation of right internal vein. Placement of right internal jugular vein tunneled dialysis catheter bard tian fry 23cm tip to cuff    VASCULAR SURGERY  12/17/2018    [facility]. Removal of tunneled dilaysis catheter right internal jugular vein. FAMILY HISTORY:  Family History   Problem Relation Age of Onset    Colon Cancer Father     Diabetes Brother     Colon Polyps Neg Hx     Liver Cancer Neg Hx     Liver Disease Neg Hx     Esophageal Cancer Neg Hx     Rectal Cancer Neg Hx     Stomach Cancer Neg Hx            OBJECTIVE:  BP (!) 134/59   Pulse 59   Temp 97.7 °F (36.5 °C) (Temporal)   Resp 20   Ht 6' (1.829 m)   Wt 222 lb 2 oz (100.8 kg)   SpO2 98%   BMI 30.13 kg/m²   No intake/output data recorded.     PHYSICAL  EXAMINATION:    BENITO:  Awake, alert, oriented x 3, patient appears tired and fatigued   Head/Eyes:  Normocephalic, atraumatic, EOMI and PERRLA bilaterally   Respiratory:   Bilateral decreased air entry in both lung fields, scattered bilateral rales, mildly coarse bilateral breath sounds   Cardiovascular:  Regular rate and rhythm, S1+S2+0, no murmurs/rubs   Abdomen:   Soft, non-tender, bowel sounds +ve, no organomegaly   Extremities: Moves all, full range of motion, +2 bilateral pitting edema   Neurologic: Awake, alert, oriented x 3, cranial nerves II-XII intact, no focal neurological deficits, sensory system intact   Psychiatric: Normal mood, non-suicidal       CURRENT MEDICATIONS:  Scheduled:   ipratropium-albuterol  1 ampule Inhalation Q4H WA    enoxaparin  30 mg SubCUTAneous Lunch    furosemide  20 mg IntraVENous Daily    aspirin  81 mg Oral Daily    atorvastatin  20 mg Oral Daily    metoprolol succinate  100 mg Oral Daily    NIFEdipine  60 mg Oral Daily    ferrous sulfate  325 mg Oral BID WC    folic acid  1 mg Oral Daily    vitamin D  50,000 Units Oral Weekly    bumetanide  2 mg Oral Daily    glipiZIDE  10 mg Oral QAM AC    pantoprazole  40 mg Oral Daily    tamsulosin  0.8 mg Oral Daily    Plecanatide  1 tablet Oral Daily    amiodarone  200 mg Oral BID    metOLazone  5 mg Oral Daily    trospium  20 mg Oral BID AC    sodium chloride flush  10 mL IntraVENous 2 times per day        PRN:  sennosides-docusate sodium, 1 tablet, Nightly PRN  sodium chloride flush, 10 mL, PRN  sodium chloride, , PRN  promethazine, 12.5 mg, Q6H PRN   Or  ondansetron, 4 mg, Q6H PRN  polyethylene glycol, 17 g, Daily PRN  acetaminophen, 650 mg, Q6H PRN   Or  acetaminophen, 650 mg, Q6H PRN        Infusions:   sodium chloride         Laboratory Data:  Recent Labs     05/24/22 1700 05/25/22  0159   WBC 11.4* 11.6*   HGB 12.7* 11.2*    135     Recent Labs     05/24/22 1700 05/25/22  0021 05/25/22  0159     --  142   K 4.1 3.8 4.1   CL 99  --  103   CO2 23  --  23   BUN 51*  --  52*   CREATININE 2.5*  --  2.5*   GLUCOSE 210*  --  193*     Recent Labs     05/24/22  1700   AST 26   ALT 26   BILITOT 0.9   ALKPHOS 118     Troponin T:   Recent Labs     05/24/22 1700 05/25/22  0159 05/25/22  0702   TROPONINI 0.04* 0.04* 0.04*     Pro-BNP: No results for input(s): BNP in the last 72 hours. INR: No results for input(s): INR in the last 72 hours. UA:No results for input(s): NITRITE, COLORU, PHUR, LABCAST, WBCUA, RBCUA, MUCUS, TRICHOMONAS, YEAST, BACTERIA, CLARITYU, SPECGRAV, LEUKOCYTESUR, UROBILINOGEN, BILIRUBINUR, BLOODU, GLUCOSEU, AMORPHOUS in the last 72 hours. Invalid input(s): Euel Sinks  A1C: No results for input(s): LABA1C in the last 72 hours. ABG:  Recent Labs     05/25/22  0021   PHART 7.310*   EGP4BZD 49.0*   PO2ART 57.0*   REX5XVX 24.7   BEART -1.9   HGBAE 11.8*   I2SSHISW 88.1*   CARBOXHGBART 1.4         Imaging:    XR CHEST PORTABLE    Result Date: 5/24/2022  1. Appearance favors interstitial edema with developing perihilar and infrahilar alveolar edema. No consolidation or obvious pleural effusion.  Signed by Dr Moishe Holstein:    Principal Problem:    Acute on chronic systolic (congestive) heart failure (HCC)  Active Problems:    Long term current use of antiarrhythmic drug    Back pain    Hyperlipidemia    PVD (peripheral vascular disease) (HCC)    Urge incontinence of urine    Arthritis of knee    Essential hypertension    GERD (gastroesophageal reflux disease)    Benign prostatic hyperplasia with lower urinary tract symptoms    Chronic kidney disease    Vitamin D deficiency    Anemia    Pacemaker  Resolved Problems:    * No resolved hospital problems. *      Admit patient to PCU under full inpatient status  Continuous cardiac telemetry monitoring  Patient given Lasix 40 mg IV x1 dose  Patient is diuresing well with improvement in his symptoms  Continue with his regular dose of diuretics in the hospital  Strict I's and O's  Daily weights  Patient has minimally elevated troponins of 0.04 x without any chest pain, likely due to myocardial strain  Cardiology evaluated the patient and do not advise any further work-up for elevated troponins  Cardiology recommended couple more doses of IV diuretics before sending patient back on his home dose of diuretics  Last echo reviewed from 11/21 which shows preserved left ventricular function and EF around 60%  Continue with cardiac diet  Encourage patient to walk in hallways      Repeat labs in a.m. Electrolyte replacement as per protocol. Patient will be monitored very closely on the floor. Further recommendations as per the hospital course. Discharge Plan: DC patient home in a.m. on oral diuretics if patient remains stable and cleared by cardiology      Patient  is on DVT prophylaxis  Current medications reviewed  Lab work reviewed  Radiology/Chest x-ray films reviewed  Treatment recommendations from suspecialities reviewed, appreciated and agreed with  Discussed with the nurse and addressed all questions/concerns  Discussed with Patient and/or Family at the bedside in detail . .. they understand and agree with the management plan. Pool Gallagher MD  5/25/2022 4:58 PM      DISCLAIMER: This note was created with electronic voice recognition which does have occasional errors. If you have any questions regarding the content within the note please do not hesitate to contact me. .. Thanks.

## 2022-05-25 NOTE — CONSULTS
Nephrology ([facility]) Consult Note      Patient:  Nina Coronado  YOB: 1940  Date of Service: 5/25/2022  MRN: [id number]   Acct: [de-identified]   Primary Care Physician: Peggy Ho MD  Advance Directive: Full Code  Admit Date: 5/24/2022       Hospital Day: 1  Referring Provider: Caesar Castillo MD    Patient independently seen and examined, Chart, Consults, Notes, Operative notes, Labs, Cardiology, and Radiology studies reviewed as available. Subjective:  Nina Coronado is a [de-identified] y.o. male for whom we were consulted for evaluation and treatment of chronic kidney disease stage IV. Patient has followed with our office for some time after an episode of acute kidney injury which required short-term dialysis. Baseline GFR of 22 and alert system from 12/5/2021. Was admitted for evaluation of shortness of air and edema. He was started on IV Lasix with cardiology evaluation. He was feeling better this afternoon and was off oxygen. He noted that he had been out of his outpatient diuretic for about a week prior to admission as the pharmacy was out of that particular agent. Also note some issues with urinary incontinence and recalled that he had follow-up with Dr. Shahla Carpenter in the past.  Denied dysuria hematuria, hemoptysis or rash.     Allergies:  Eliquis [apixaban] and Promethazine hcl    Medicines:  Current Facility-Administered Medications   Medication Dose Route Frequency Provider Last Rate Last Admin    ipratropium-albuterol (DUONEB) nebulizer solution 1 ampule  1 ampule Inhalation Q4H WA Patience Tawanda, DO   1 ampule at 05/25/22 1108    enoxaparin Sodium (LOVENOX) injection 30 mg  30 mg SubCUTAneous Lunch Antonio Perez MD        aspirin EC tablet 81 mg  81 mg Oral Daily Antonio Perez MD   81 mg at 05/25/22 0915    atorvastatin (LIPITOR) tablet 20 mg  20 mg Oral Daily Antonio Perez MD   20 mg at 05/25/22 0915    metoprolol succinate (TOPROL XL) extended release tablet 100 mg  100 mg Oral Daily Antonio Perez MD   100 mg at 05/25/22 0916    NIFEdipine (PROCARDIA XL) extended release tablet 60 mg  60 mg Oral Daily Antonio Perez MD   60 mg at 05/25/22 0916    ferrous sulfate (IRON 325) tablet 325 mg  325 mg Oral BID  Antonio Perez MD   325 mg at 46/49/82 9972    folic acid (FOLVITE) tablet 1 mg  1 mg Oral Daily Antonio Perez MD   1 mg at 05/25/22 0915    vitamin D (ERGOCALCIFEROL) capsule 50,000 Units  50,000 Units Oral Weekly Antonio Perez MD   50,000 Units at 05/25/22 0915    bumetanide (BUMEX) tablet 2 mg  2 mg Oral Daily Antonio Perez MD   2 mg at 05/25/22 0915    sennosides-docusate sodium (SENOKOT-S) 8.6-50 MG tablet 1 tablet  1 tablet Oral Nightly PRN Antonio Perez MD        glipiZIDE (GLUCOTROL) tablet 10 mg  10 mg Oral QAM AC Antonio Perez MD   10 mg at 05/25/22 0915    pantoprazole (PROTONIX) tablet 40 mg  40 mg Oral Daily Antonio Perez MD   40 mg at 05/25/22 0916    tamsulosin (FLOMAX) capsule 0.8 mg  0.8 mg Oral Daily Antonio Perez MD   0.8 mg at 05/25/22 0916    Plecanatide TABS 1 tablet  1 tablet Oral Daily Antonio Perez MD        amiodarone (CORDARONE) tablet 200 mg  200 mg Oral BID Antonio Perez MD   200 mg at 05/25/22 0916    metOLazone (ZAROXOLYN) tablet 5 mg  5 mg Oral Daily Antonio Perez MD   5 mg at 05/25/22 0916    trospium (SANCTURA) tablet 20 mg  20 mg Oral BID AC Antonio Perez MD   20 mg at 05/25/22 0916    sodium chloride flush 0.9 % injection 10 mL  10 mL IntraVENous 2 times per day Antonio Perez MD   10 mL at 05/25/22 0917    sodium chloride flush 0.9 % injection 10 mL  10 mL IntraVENous PRN Antonio Perez MD        0.9 % sodium chloride infusion   IntraVENous PRN Antonio Perez MD        promethazine (PHENERGAN) tablet 12.5 mg  12.5 mg Oral Q6H PRN Antonio Perez MD        Or    ondansetron (ZOFRAN) injection 4 mg  4 mg IntraVENous Q6H PRN Antonio Perez MD        polyethylene glycol (GLYCOLAX) packet 17 g  17 g Oral Daily PRN Nhan Vaughn MD       [facility] acetaminophen (TYLENOL) tablet 650 mg  650 mg Oral Q6H PRN Antonio Perez MD        Or    acetaminophen (TYLENOL) suppository 650 mg  650 mg Rectal Q6H PRN Antonio Perez MD           Past Medical History:  Past Medical History:   Diagnosis Date    Acute liver failure without hepatic coma 10/23/2018    Back pain     \"with tired legs as a result\"    Bladder cancer ([facility] 12/19/2018    Blood circulation, collateral     Carotid arterial disease ([facility]     recent surgery    CKD (chronic kidney disease), 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/2018    Constipation     COPD with acute lower respiratory infection ([facility] 02/24/2021    Epigastric discomfort     GERD (gastroesophageal reflux disease)     Hyperlipidemia     Hypertension     Hypertension     Pacemaker     Palliative care patient 10/23/2018    Pneumonia due to infectious organism 11/06/2018    Primary osteoarthritis of left knee 10/14/2018    PVD (peripheral vascular disease) (HCC)     Tremor     Tremor on Right side x 1-2 weeks per stepdaughter    Type 2 diabetes mellitus with complication, without long-term current use of insulin ([facility] 01/21/2021       Past Surgical History:  Past Surgical History:   Procedure Laterality Date    BACK SURGERY      CARDIAC CATHETERIZATION  03/23/2021    100% RCA    COLONOSCOPY  2007?     CYSTOSCOPY Bilateral 12/19/2018    CYSTOSCOPY, BIOSPY FULGURATION OF BLADDER TUMOR POSSIBLE TURBT, RETROGRADE PYELOGRAM performed by Sera Garcia MD at [facility] COLONOSCOPY FLX DX W/COLLJ SPEC WHEN PFRMD N/A 09/11/2017    Dr Blayne Qureshi internal hemorrhoids, diverticular disease-HP-No recall (age)   Selina Moore CO REVISE MEDIAN N/CARPAL TUNNEL SURG Left 07/18/2018    OPEN CARPAL TUNNEL RELEASE performed by Darek Bynum MD at [address] Left 08/28/2018    LEFT CAROTID ENDARTERECTOMY WITH VEIN PATCH ANGIOPLASTY AND COMPLETION ANGIOGRAM performed by Tia Davalos MD at [facility] Left 10/15/2018    LEFT COMPLEX TOTAL KNEE ARTHROPLASTY performed by Dayami Marcano MD at [address] [place] [address] GASTROINTESTINAL ENDOSCOPY N/A 11/18/2021    Dr Jerrell Gastelum, Sub prep lg amount of solid and semisolid food/Medication in stomach body/antrum/pylorus, stomach lumen appeared to distended w air insufflation and collapse upon suction,   inadequate exam sugg of gastroparesis, repeat in 10-14 days    UPPER GASTROINTESTINAL ENDOSCOPY N/A 01/06/2022    Dr Jerrell Gastelum, distal stomach body likely GIST nodule-Submucosal fundic body gastric mucosa, Benign, (-)Hpylori, (-)Sprue    UPPER GASTROINTESTINAL ENDOSCOPY N/A 03/07/2022    Dr Jerrell Gastelum, EUS submucosal gastric nodule, otherwise normal, 1 year recall    UPPER GASTROINTESTINAL ENDOSCOPY N/A 03/07/2022    Dr Jerrell Gastelum, W EUS,  nodular area at end of rugal gold in distal stomach body    VASCULAR SURGERY  04/21/2015    Leda LIU. Ultrasound guided access of left common femoral artery. Aortogram.Diagnostic right lower extremity arteriogram.Radiologic supervision and interpretation.  VASCULAR SURGERY  01/13/2015    Leda Lozada M.D Atherectomy,angioplasty,and stenting of left superficial femoral artery.  VASCULAR SURGERY  03/11/2014    Leda Lozada M.D. Ultrasound-guided access of right common femoral artery. Aortogram.Left lower extremity arteriogram.Atherectomy and angioplasty of left superficial femoral artery. Radiologic supervision and interpretation.  VASCULAR SURGERY  01/18/2013    Leda LIU. Aortogram.Multistation arteriogram right lower extremity. Laser atherectomy and angioplasty of right superficial femoral artery. Selective catheterization of right tibioperoneal trunk. Angioplasty of peroneal artery and tibioperoneal trunk.  VASCULAR SURGERY  10/30/2018    [facility]. Ultrasound guided cannulation of right internal vein. Placement of right internal jugular vein tunneled dialysis catheter bard equistream xk 23cm tip to cuff    VASCULAR SURGERY  2018    [facility]. Removal of tunneled dilaysis catheter right internal jugular vein. Family History  Family History   Problem Relation Age of Onset    Colon Cancer Father     Diabetes Brother     Colon Polyps Neg Hx     Liver Cancer Neg Hx     Liver Disease Neg Hx     Esophageal Cancer Neg Hx     Rectal Cancer Neg Hx     Stomach Cancer Neg Hx        Social History  Social History     Socioeconomic History    Marital status:      Spouse name: Not on file    Number of children: Not on file    Years of education: Not on file    Highest education level: Not on file   Occupational History    Not on file   Tobacco Use    Smoking status: Former Smoker     Packs/day: 2.00     Years: 48.00     Pack years: 96.00     Types: Cigarettes     Quit date: 6/3/2003     Years since quittin.9    Smokeless tobacco: Never Used   Vaping Use    Vaping Use: Never used   Substance and Sexual Activity    Alcohol use: Yes     Alcohol/week: 12.0 standard drinks     Types: 12 Glasses of wine per week     Comment: 1 glasses of wine every night    Drug use: No    Sexual activity: Yes     Partners: Female   Other Topics Concern    Not on file   Social History Narrative    Not on file     Social Determinants of Health     Financial Resource Strain:     Difficulty of Paying Living Expenses: Not on file   Food Insecurity:     Worried About Running Out of Food in the Last Year: Not on file    Ankit of Food in the Last Year: Not on file   Transportation Needs:     Lack of Transportation (Medical): Not on file    Lack of Transportation (Non-Medical):  Not on file   Physical Activity:     Days of Exercise per Week: Not on file    Minutes of Exercise per Session: Not on file   Stress:     Feeling of Stress : Not on file   Social Connections:     Frequency of Communication with Friends and Family: Not on file    Frequency of Social Gatherings with Friends and Family: Not on file    Attends Restorationism Services: Not on file    Active Member of Clubs or Organizations: Not on file    Attends Club or Organization Meetings: Not on file    Marital Status: Not on file   Intimate Partner Violence:     Fear of Current or Ex-Partner: Not on file    Emotionally Abused: Not on file    Physically Abused: Not on file    Sexually Abused: Not on file   Housing Stability:     Unable to Pay for Housing in the Last Year: Not on file    Number of [place] in the Last Year: Not on file    Unstable Housing in the Last Year: Not on file         Review of Systems:  History obtained from chart review and the patient  General ROS: No fever or chills  Respiratory ROS: No cough, +shortness of breath, wheezing  Cardiovascular ROS: No chest pain or palpitations  Gastrointestinal ROS: No abdominal pain or melena  Genito-Urinary ROS: No dysuria or hematuria  Musculoskeletal ROS: No joint pain or swelling   14 point ROS reviewed with the patient and negative except as noted above and in the HPI unless unable to obtain.     Objective:  Patient Vitals for the past 24 hrs:   BP Temp Temp src Pulse Resp SpO2 Height Weight   05/25/22 1108 -- -- -- -- -- 98 % -- --   05/25/22 1021 (!) 134/59 97.7 °F (36.5 °C) Temporal 59 20 95 % -- --   05/25/22 1010 -- -- -- -- -- -- 6' (1.829 m) --   05/25/22 0736 -- -- -- -- -- 95 % -- --   05/25/22 0539 134/63 98 °F (36.7 °C) Temporal 60 18 95 % -- --   05/25/22 0028 -- -- -- 70 -- -- -- --   05/25/22 0012 -- -- -- -- -- -- (!) 6\" (0.152 m) 222 lb 2 oz (100.8 kg)   05/24/22 2315 (!) 151/65 97.3 °F (36.3 °C) Temporal 76 20 91 % -- 222 lb 3.2 oz (100.8 kg)   05/24/22 2000 (!) 152/71 -- -- 60 -- 91 % -- --   05/24/22 1730 -- -- -- -- -- 95 % -- --   05/24/22 1715 (!) 157/79 -- -- -- -- -- -- --   05/24/22 1626 -- -- -- -- -- (!) 88 % -- --   05/24/22 1615 (!) 155/76 98.9 °F (37.2 °C) Temporal 60 24 -- -- 213 lb (96.6 kg)     No intake or output data in the 24 hours ending 05/25/22 1213  General: awake/alert   Chest:  clear to auscultation bilaterally  CVS: regular rate and rhythm  Abdominal: soft, nontender, normal bowel sounds  Extremities: no cyanosis, ble edema  Skin: warm and dry without rash      Labs:  BMP:   Recent Labs     05/24/22  1700 05/25/22  0021 05/25/22  0159     --  142   K 4.1 3.8 4.1   CL 99  --  103   CO2 23  --  23   PHOS 3.1  --   --    BUN 51*  --  52*   CREATININE 2.5*  --  2.5*   CALCIUM 9.9  --  9.6     CBC:   Recent Labs     05/24/22  1700 05/25/22  0159   WBC 11.4* 11.6*   HGB 12.7* 11.2*   HCT 40.9* 35.8*   MCV 93.2 93.2    135     LIVER PROFILE:   Recent Labs     05/24/22  1700   AST 26   ALT 26   BILITOT 0.9   ALKPHOS 118     PT/INR: No results for input(s): PROTIME, INR in the last 72 hours. APTT: No results for input(s): APTT in the last 72 hours. BNP:  No results for input(s): BNP in the last 72 hours. Ionized Calcium:No results for input(s): IONCA in the last 72 hours. Magnesium:  Recent Labs     05/24/22  1700   MG 2.2     Phosphorus:  Recent Labs     05/24/22  1700   PHOS 3.1     HgbA1C: No results for input(s): LABA1C in the last 72 hours. Hepatic:   Recent Labs     05/24/22  1700   ALKPHOS 118   ALT 26   AST 26   PROT 7.8   BILITOT 0.9   LABALBU 4.8     Lactic Acid: No results for input(s): LACTA in the last 72 hours. Troponin: No results for input(s): CKTOTAL, CKMB, TROPONINT in the last 72 hours. ABGs: No results for input(s): PH, PCO2, PO2, HCO3, O2SAT in the last 72 hours. CRP:  No results for input(s): CRP in the last 72 hours. Sed Rate:  No results for input(s): SEDRATE in the last 72 hours. Cultures:   No results for input(s): CULTURE in the last 72 hours. No results for input(s): BC, Caralee Roller in the last 72 hours. No results for input(s): CXSURG in the last 72 hours.     Radiology reports as per the Radiologist  Radiology: XR CHEST PORTABLE    Result Date: 5/24/2022  XR CHEST PORTABLE 5/24/2022 5:11 PM HISTORY: Shortness of breath  Technique: Single AP view of the chest COMPARISONS: Chest exam dated 11/23/2021 FINDINGS: Mild cardiomegaly. Central pulmonary vascular congestion. Bilateral interstitial coarsening and patchy perihilar and infrahilar opacities, appearance favoring a developing pulmonary edema. No dense consolidation. Elevation of the right hemidiaphragm which is likely chronic. No pleural effusion or pneumothorax. Left chest wall dual chamber pacemaker appears stable. No acute bony abnormality. 1. Appearance favors interstitial edema with developing perihilar and infrahilar alveolar edema. No consolidation or obvious pleural effusion. Signed by Dr Jon Sinha       Assessment   Chronic kidney disease stage IV  Diabetes type 2 with diabetic nephropathy  Acute on chronic diastolic congestive heart failure  Anemia    Plan:  Discussed patient, nursing  Work-up ordered and ongoing  Monitor labs  Diuresing well without worsening renal function at this point, would continue  Encouraged patient to continue consistent outpatient diuretic management as recommended by outpatient physicians and please contact my office earlier primary care if he is running out of an agent in the future      Thank you for the consult, we appreciate the opportunity to provide care to your patients. Feel free to contact me if I can be of any further assistance.       Christa Moore MD  05/25/22  12:13 PM

## 2022-05-25 NOTE — ED NOTES
When scooting pt up in bed, pt complained of bed being wet. This nurse offered to change linens and brief and pt refused.  Pt states \"I want to wait until I get upstairs to my room\"      Sujatha Flores RN  05/24/22 3865

## 2022-05-25 NOTE — CONSULTS
[address] [facility]  Cardiology Consult      Requesting MD:  Tom Tom MD   Admit Status:         History obtained from:   [] Patient  [] Other (specify):     Patient:  Lillian Funk  [id number]     Chief Complaint:   Chief Complaint   Patient presents with    Shortness of Breath     On O2 at home. Worsening shortness of air and fatigue over the last few days          HPI: Mr. Carla Rutledge is a [de-identified] y.o. male with a history of diastolic congestive heart failure in his usual state of health until 7 to 8 days ago when he ran out of his diuretic and his pharmacy could not get it refilled in a timely manner. Taking his other medications. He has been more short of breath and edematous. Denies anginal chest pain. No other intercurrent symptoms or illnesses noted. Admitted yesterday. proBNP 4513 compared to 4138 on 11/23/2021. Several troponins obtained already all 0.04 which is minimally elevated no clear-cut trend. Cardiology now consulted. Review of Systems:  Review of Systems   Constitutional: Negative. Negative for chills, fever and unexpected weight change. HENT: Negative. Eyes: Negative. Respiratory: Negative. Negative for shortness of breath. Cardiovascular: Negative. Negative for chest pain. Gastrointestinal: Negative. Negative for diarrhea, nausea and vomiting. Endocrine: Negative. Genitourinary: Negative. Musculoskeletal: Negative. Skin: Negative. Neurological: Negative. All other systems reviewed and are negative.       Cardiac Specific Data:  Specialty Problems        Cardiology Problems    Nonsustained ventricular tachycardia (HCC)        * (Principal) Acute on chronic systolic (congestive) heart failure (HCC)        Carotid arterial disease (HCC)        Hyperlipidemia        PVD (peripheral vascular disease) (HCC)        Atherosclerosis of native artery of both lower extremities with intermittent claudication (HCC)        Bilateral carotid artery stenosis        Carotid stenosis, asymptomatic, left        Essential hypertension        Pure hypercholesterolemia        CHF (congestive heart failure) (HCC)        Atrial fibrillation (HCC)        Chronic diastolic congestive heart failure ([address]        Acute decompensated heart failure ([address]              Past Medical History:  Past Medical History:   Diagnosis Date    Acute liver failure without hepatic coma 10/23/2018    Back pain     \"with tired legs as a result\"    Bladder cancer ([address] 12/19/2018    Blood circulation, collateral     Carotid arterial disease (HCC)     recent surgery    CKD (chronic kidney disease), 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/2018    Constipation     COPD with acute lower respiratory infection ([address] 02/24/2021    Epigastric discomfort     GERD (gastroesophageal reflux disease)     Hyperlipidemia     Hypertension     Hypertension     Pacemaker     Palliative care patient 10/23/2018    Pneumonia due to infectious organism 11/06/2018    Primary osteoarthritis of left knee 10/14/2018    [facility]VD [facility]peripheral vascular disease) [facility]     Tremor     Tremor on Right side x 1-2 weeks per stepdaughter    Type 2 diabetes mellitus with complication, without long-term current use of insulin ([address] 01/21/2021        Past Surgical History:  Past Surgical History:   Procedure Laterality Date    BACK SURGERY      CARDIAC CATHETERIZATION  03/23/2021    100% RCA    COLONOSCOPY  2007?     CYSTOSCOPY Bilateral 12/19/2018    CYSTOSCOPY, BIOSPY FULGURATION OF BLADDER TUMOR POSSIBLE TURBT, RETROGRADE PYELOGRAM performed by Sera Garcia MD at [address] COLONOSCOPY FLX DX W/COLLJ SPEC WHEN PFRMD N/A 09/11/2017    Dr Blayne Qureshi internal hemorrhoids, diverticular disease-HP-No recall (age)   [facility] [place] REVISE MEDIAN N/CARPAL TUNNEL SURG Left 07/18/2018    OPEN CARPAL TUNNEL RELEASE performed by Darek Bynum MD at [address] Left 08/28/2018    LEFT CAROTID ENDARTERECTOMY WITH VEIN PATCH ANGIOPLASTY AND COMPLETION ANGIOGRAM performed by Anmol Treadwell MD at [facility] Left 10/15/2018    LEFT COMPLEX TOTAL KNEE ARTHROPLASTY performed by Sydnee Nael MD at [address] [place] [address] GASTROINTESTINAL ENDOSCOPY N/A 11/18/2021    Dr Ramiro Nieto, Sub prep lg amount of solid and semisolid food/Medication in stomach body/antrum/pylorus, stomach lumen appeared to distended w air insufflation and collapse upon suction,   inadequate exam sugg of gastroparesis, repeat in 10-14 days    UPPER GASTROINTESTINAL ENDOSCOPY N/A 01/06/2022    Dr Ramiro Nieto, distal stomach body likely GIST nodule-Submucosal fundic body gastric mucosa, Benign, (-)Hpylori, (-)Sprue    UPPER GASTROINTESTINAL ENDOSCOPY N/A 03/07/2022    Dr Ramiro Nieto, EUS submucosal gastric nodule, otherwise normal, 1 year recall    UPPER GASTROINTESTINAL ENDOSCOPY N/A 03/07/2022    Dr Ramiro Nieto, W EUS,  nodular area at end of rugal gold in distal stomach body    VASCULAR SURGERY  04/21/2015    Pina BESS Ultrasound guided access of left common femoral artery. Aortogram.Diagnostic right lower extremity arteriogram.Radiologic supervision and interpretation.  VASCULAR SURGERY  01/13/2015    Pina Cedeno M.D Atherectomy,angioplasty,and stenting of left superficial femoral artery.  VASCULAR SURGERY  03/11/2014    Pina Cedeno M.D. Ultrasound-guided access of right common femoral artery. Aortogram.Left lower extremity arteriogram.Atherectomy and angioplasty of left superficial femoral artery. Radiologic supervision and interpretation.  VASCULAR SURGERY  01/18/2013    Pina BESS Aortogram.Multistation arteriogram right lower extremity. Laser atherectomy and angioplasty of right superficial femoral artery. Selective catheterization of right tibioperoneal trunk. Angioplasty of peroneal artery and tibioperoneal trunk.     VASCULAR SURGERY  10/30/2018 [facility].Ultrasound guided cannulation of right internal vein. Placement of right internal jugular vein tunneled dialysis catheter bard tian xk 23cm tip to cuff    VASCULAR SURGERY  2018    [facility]. Removal of tunneled dilaysis catheter right internal jugular vein. Past Family History:  Family History   Problem Relation Age of Onset    Colon Cancer Father     Diabetes Brother     Colon Polyps Neg Hx     Liver Cancer Neg Hx     Liver Disease Neg Hx     Esophageal Cancer Neg Hx     Rectal Cancer Neg Hx     Stomach Cancer Neg Hx        Past Social History:  Social History     Socioeconomic History    Marital status:      Spouse name: Not on file    Number of children: Not on file    Years of education: Not on file    Highest education level: Not on file   Occupational History    Not on file   Tobacco Use    Smoking status: Former Smoker     Packs/day: 2.00     Years: 48.00     Pack years: 96.00     Types: Cigarettes     Quit date: 6/3/2003     Years since quittin.9    Smokeless tobacco: Never Used   Vaping Use    Vaping Use: Never used   Substance and Sexual Activity    Alcohol use: Yes     Alcohol/week: 12.0 standard drinks     Types: 12 Glasses of wine per week     Comment: 1 glasses of wine every night    Drug use: No    Sexual activity: Yes     Partners: Female   Other Topics Concern    Not on file   Social History Narrative    Not on file     Social Determinants of Health     Financial Resource Strain:     Difficulty of Paying Living Expenses: Not on file   Food Insecurity:     Worried About Running Out of Food in the Last Year: Not on file    Ankit of Food in the Last Year: Not on file   Transportation Needs:     Lack of Transportation (Medical): Not on file    Lack of Transportation (Non-Medical):  Not on file   Physical Activity:     Days of Exercise per Week: Not on file    Minutes of Exercise per Session: Not on file   Stress:     Feeling of Stress : Not on file   Social Connections:     Frequency of Communication with Friends and Family: Not on file    Frequency of Social Gatherings with Friends and Family: Not on file    Attends Protestant Services: Not on file    Active Member of Clubs or Organizations: Not on file    Attends Club or Organization Meetings: Not on file    Marital Status: Not on file   Intimate Partner Violence:     Fear of Current or Ex-Partner: Not on file    Emotionally Abused: Not on file    Physically Abused: Not on file    Sexually Abused: Not on file   Housing Stability:     Unable to Pay for Housing in the Last Year: Not on file    Number of [place] in the Last Year: Not on file    Unstable Housing in the Last Year: Not on file       Allergies: Allergies   Allergen Reactions    Eliquis [Apixaban] Other (See Comments)     \"almost bled to death\"    Promethazine Hcl Other (See Comments)     He became encephalopathic for several hours and was not responsive. Home Meds:  Prior to Admission medications    Medication Sig Start Date End Date Taking?  Authorizing Provider   cephALEXin (KEFLEX) 500 MG capsule Take 1 capsule by mouth 2 times daily for 10 days  Patient not taking: Reported on 5/24/2022 5/23/22 6/2/22  ELISABETH Braga - CNP   metOLazone (ZAROXOLYN) 2.5 MG tablet TAKE 1 TABLET BY MOUTH EVERY DAY AS NEEDED FOR SWELLING 4/18/22   Historical Provider, MD   mirabegron (MYRBETRIQ) 25 MG TB24 Take 1 tablet by mouth daily 5/20/22   ELISABETH Braga - CNP   amiodarone (CORDARONE) 200 MG tablet TAKE 1 TABLET BY MOUTH DAILY  Patient taking differently: Take 200 mg by mouth 2 times daily  5/18/22   ELISABETH Lewis   TRULANCE 3 MG TABS TAKE 1 TABLET BY MOUTH DAILY 3/11/22   ELISABETH Murphy - NP   UNABLE TO FIND Take 20 mg by mouth daily trosopium    Historical Provider, MD   tamsulosin (FLOMAX) 0.4 MG capsule Take 2 capsules by mouth daily 12/13/21   Mickey Mclain MD   pantoprazole ([address]) 20 MG tablet Take 40 mg by mouth daily     Historical Provider, MD   SENNA PLUS 8.6-50 MG per tablet  10/26/21   Historical Provider, MD   glipiZIDE (GLUCOTROL XL) 10 MG extended release tablet 2.5 mg daily  10/19/21   Historical Provider, MD   bumetanide (BUMEX) 1 MG tablet Take 2 mg by mouth daily     Historical Provider, MD   aspirin 81 MG EC tablet Take 1 tablet by mouth daily  Patient not taking: Reported on 5/24/2022 3/29/21   Jaspal Porras MD   atorvastatin (LIPITOR) 20 MG tablet Take 1 tablet by mouth daily 3/29/21   Jaspal Porras MD   metoprolol succinate (TOPROL XL) 100 MG extended release tablet Take 1 tablet by mouth daily  Patient taking differently: Take 10 mg by mouth daily  3/29/21   Jaspal Porras MD   NIFEdipine (ADALAT CC) 60 MG extended release tablet Take 1 tablet by mouth daily 3/29/21   Jaspal Porras MD   ferrous sulfate (IRON 325) 325 (65 Fe) MG tablet Take 1 tablet by mouth 2 times daily (with meals)  Patient not taking: Reported on 5/24/2022 3/29/21   Jaspal Porras MD   folic acid (FOLVITE) 1 MG tablet Take 1 tablet by mouth daily 3/29/21   Jaspal Porras MD   vitamin D (ERGOCALCIFEROL) 1.25 MG (20939 UT) CAPS capsule Take 1 capsule by mouth once a week 3/29/21   Jaspal Porras MD   OXYGEN Inhale 2 L into the lungs continuous 3/29/21   Jaspal Porras MD       Current Meds:   ipratropium-albuterol  1 ampule Inhalation Q4H WA    enoxaparin  30 mg SubCUTAneous Lunch    aspirin  81 mg Oral Daily    atorvastatin  20 mg Oral Daily    metoprolol succinate  100 mg Oral Daily    NIFEdipine  60 mg Oral Daily    ferrous sulfate  325 mg Oral BID WC    folic acid  1 mg Oral Daily    vitamin D  50,000 Units Oral Weekly    bumetanide  2 mg Oral Daily    glipiZIDE  10 mg Oral QAM AC    pantoprazole  40 mg Oral Daily    tamsulosin  0.8 mg Oral Daily    Plecanatide  1 tablet Oral Daily    amiodarone  200 mg Oral BID    metOLazone  5 mg Oral Daily    trospium  20 mg Oral BID AC    sodium chloride flush  10 mL IntraVENous 2 times per day       Current Infused Meds:   sodium chloride         Physical Exam:  Vitals:    05/25/22 1108   BP:    Pulse:    Resp:    Temp:    SpO2: 98%     No intake or output data in the 24 hours ending 05/25/22 1532  Estimated body mass index is 30.13 kg/m² as calculated from the following:    Height as of this encounter: 6' (1.829 m). Weight as of this encounter: 222 lb 2 oz (100.8 kg). Physical Exam  Vitals reviewed. Constitutional:       General: He is not in acute distress. Appearance: Normal appearance. He is well-developed and normal weight. He is not ill-appearing, toxic-appearing or diaphoretic. HENT:      Head: Normocephalic and atraumatic. Nose: Nose normal.      Mouth/Throat:      Mouth: Mucous membranes are moist.      Pharynx: Oropharynx is clear. Eyes:      General: No scleral icterus. Extraocular Movements: Extraocular movements intact. Pupils: Pupils are equal, round, and reactive to light. Neck:      Vascular: No carotid bruit or JVD. Cardiovascular:      Rate and Rhythm: Normal rate and regular rhythm. Heart sounds: Normal heart sounds. No murmur heard. No friction rub. No gallop. Pulmonary:      Effort: Pulmonary effort is normal. No respiratory distress. Breath sounds: Normal breath sounds. No stridor. No wheezing, rhonchi or rales. Chest:      Chest wall: No tenderness. Abdominal:      General: Abdomen is flat. Bowel sounds are normal. There is no distension. Palpations: Abdomen is soft. There is no mass. Tenderness: There is no abdominal tenderness. There is no right CVA tenderness, left CVA tenderness, guarding or rebound. Hernia: No hernia is present. Musculoskeletal:         General: No swelling, tenderness, deformity or signs of injury. Cervical back: Normal range of motion and neck supple. No rigidity or tenderness. Right lower leg: No edema.       Left lower leg: No edema.   Lymphadenopathy:      Cervical: No cervical adenopathy. Skin:     General: Skin is warm and dry. Neurological:      General: No focal deficit present. Mental Status: He is alert and oriented to person, place, and time. Mental status is at baseline. Cranial Nerves: No cranial nerve deficit. Sensory: No sensory deficit. Motor: No weakness. Coordination: Coordination normal.   Psychiatric:         Mood and Affect: Mood normal.         Behavior: Behavior normal.         Thought Content: Thought content normal.         Judgment: Judgment normal.         Labs:  Recent Labs     05/24/22  1700 05/25/22  0159   WBC 11.4* 11.6*   HGB 12.7* 11.2*    135       Recent Labs     05/24/22  1700 05/25/22  0021 05/25/22  0159     --  142   K 4.1 3.8 4.1   CL 99  --  103   CO2 23  --  23   BUN 51*  --  52*   CREATININE 2.5*  --  2.5*   LABGLOM 25*  --  25*   MG 2.2  --   --    CALCIUM 9.9  --  9.6   PHOS 3.1  --   --        CK, CKMB, Troponin: @LABRCNT (CKTOTAL:3, CKMB:3, TROPONINI:3)@    Last 3 BNP:  No results for input(s): BNP in the last 72 hours. IMAGING:  XR CHEST PORTABLE    Result Date: 5/24/2022  XR CHEST PORTABLE 5/24/2022 5:11 PM HISTORY: Shortness of breath  Technique: Single AP view of the chest COMPARISONS: Chest exam dated 11/23/2021 FINDINGS: Mild cardiomegaly. Central pulmonary vascular congestion. Bilateral interstitial coarsening and patchy perihilar and infrahilar opacities, appearance favoring a developing pulmonary edema. No dense consolidation. Elevation of the right hemidiaphragm which is likely chronic. No pleural effusion or pneumothorax. Left chest wall dual chamber pacemaker appears stable. No acute bony abnormality. 1. Appearance favors interstitial edema with developing perihilar and infrahilar alveolar edema. No consolidation or obvious pleural effusion. Signed by Dr Pati Zhang      Assessment:  1.  Complaints of increased shortness of breath likely due to inadvertent discontinuation of diuretics for 7 to 8 days  2. Acute on chronic diastolic congestive heart failure  3. Long-term use of an antiarrhythmic drug  4. Hyperlipidemia  5. Peripheral vascular disease  6. Urinary incontinence  7. Hypertension  8. Gastroesophageal reflux disease  9. Benign prostatic hyperplasia  10. Chronic kidney disease  11. Anemia  12. Pacemaker  13. Carotid artery disease  14. Nonsustained ventricular tachycardia  15. Tremor  16. : Diverticular disease  17. Osteoarthritis  18. History of epistaxis  19. Thrombocytopenia  20. History of bladder cancer  21. Diabetes mellitus type 2  22. COPD  21. History atrial fibrillation  24. History of alcohol abuse  25. Obstructive sleep apnea treated with BiPAP  26. History hematuria  27. CTA pulmonary 3/6/2021 no evidence of pulmonary embolism small bilateral pleural effusions interstitial pulmonary edema greater than left consolidation suspicious for pneumonia  28. Echo 69/40/8594 normal LV systolic function EF 78% mild MR  29. Cardiac catheterization 3/6/2021 100% right coronary artery medical management recommended      Recommendations:  1.  IV diuresis for day or 2 then resume home medications

## 2022-05-25 NOTE — ED NOTES
This nurse went to check on pt. Pt informed this nurse that his brief and pants were wet. When offered to clean pt, pt refused and said he wanted to wait until he got to his room.       Simon Pineda RN  05/24/22 2120

## 2022-05-25 NOTE — CONSULTS
Comprehensive Nutrition Assessment    Type and Reason for Visit:  Initial,Consult    Nutrition Recommendations/Plan:   1. Follow for advancing diet, tolerance and obtain diet hx and provide education as needed     Malnutrition Assessment:  Malnutrition Status:  No malnutrition (05/25/22 1019)    Context:  Acute Illness     Findings of the 6 clinical characteristics of malnutrition:  Energy Intake:  Unable to assess  Weight Loss:  No significant weight loss     Body Fat Loss:  No significant body fat loss     Muscle Mass Loss:  No significant muscle mass loss    Fluid Accumulation:  Moderate to Severe Extremities   Strength:  Not Performed    Nutrition Assessment:    Received consult for CHF education. Currently pt is NPO. Nutrition Related Findings:    +3 edema Wound Type: None       Current Nutrition Intake & Therapies:    Average Meal Intake: NPO  Average Supplements Intake: NPO  Diet NPO    Anthropometric Measures:  Height: 6' (182.9 cm)  Ideal Body Weight (IBW): 178 lbs (81 kg)    Admission Body Weight: 213 lb (96.6 kg) (stated)  Current Body Weight: 222 lb 2 oz (100.8 kg), 124.8 % IBW. Weight Source: Standing Scale  Current BMI (kg/m2): 30.1  Usual Body Weight: 217 lb (98.4 kg) (2/2022)  % Weight Change (Calculated): 2.4  BMI Categories: Obese Class 1 (BMI 30.0-34. 9)    Nutrition Diagnosis:   · Altered nutrition-related lab values,Inadequate oral intake related to endocrine dysfuntion,renal dysfunction,acute injury/trauma as evidenced by lab values,NPO or clear liquid status due to medical condition      Nutrition Interventions:   Food and/or Nutrient Delivery: Continue NPO  Nutrition Education/Counseling: No recommendation at this time  Coordination of Nutrition Care: Continue to monitor while inpatient       Goals:     Goals: Initiate PO diet,PO intake 50% or greater,Meet at least 75% of estimated needs,other (specify)  Specify Other Goals: proBNP decreased prior to discharge    Nutrition Monitoring and Evaluation:   Behavioral-Environmental Outcomes: None Identified  Food/Nutrient Intake Outcomes: Diet Advancement/Tolerance,Food and Nutrient Intake  Physical Signs/Symptoms Outcomes: Biochemical Data,Fluid Status or Edema,Weight,Skin,Nutrition Focused Physical Findings    Discharge Planning:     Too soon to determine     Antonia Abreu MS, RD, LD  Contact: [phone]

## 2022-05-25 NOTE — PROGRESS NOTES
Pharmacy Renal Adjustment    Yonatan Lawrence is a [de-identified] y.o. male. Pharmacy has renally adjusted medications per protocol. Recent Labs     05/24/22  1700   BUN 51*       Recent Labs     05/24/22  1700   CREATININE 2.5*       Estimated Creatinine Clearance: 14 mL/min (A) (based on SCr of 2.5 mg/dL (H)). Height:   Ht Readings from Last 1 Encounters:   05/25/22 (!) 6\" (0.152 m)     Weight:  Wt Readings from Last 1 Encounters:   05/25/22 222 lb 2 oz (100.8 kg)     Plan: Adjust the following medications based on renal function:           Lovenox 40 mg SC daily to Heparin 5000 units three times daily due to weight (100.8 kg) and CrCl of 14 ml/min.     Electronically signed by Haydee Camilo [facility] on 5/25/2022 at 12:36 AM

## 2022-05-25 NOTE — PROGRESS NOTES
Physician Progress Note      PATIENTDebora Boo  CSN #:                  [id number]  :                       1940  ADMIT DATE:       2022 4:22 PM  [address] [place] DATE:  RESPONDING  PROVIDER #:        TIAGO SMITH MD          QUERY TEXT:    Pt admitted with acute/chronic systolic CHF. Pt noted to have abnormal ABG's   on 3.5 liters nc. If possible, please document in the progress notes and   discharge summary if you are evaluating and/or treating any of the   following:[[Chronic respiratory failure with hypoxia and hypercapnia[de-identified] This   patient has chronic respiratory failure with hypoxia and hypercapnia. The medical record reflects the following:  Risk Factors: CHF, Anemia, HTN,  Clinical Indicators: P02 57, pc02 49 Sat 88% on 3.5 liters nc. RR 24 ,   Shortness of breath, leg swelling. Treatment: Oxygen up to 3.5 liters, Duoneb, Lasix IV 40 mg, Bumex 2 mg po   daily. Thank you    Mike Wright RN, BSN, [facility]  [phone]  Options provided:  -- Acute respiratory failure with hypoxia and hypercapnia  -- Acute on chronic respiratory failure with hypoxia and hypercapnia  -- Other - I will add my own diagnosis  -- Disagree - Not applicable / Not valid  -- Disagree - Clinically unable to determine / Unknown  -- Refer to Clinical Documentation Reviewer    PROVIDER RESPONSE TEXT:    This patient is in acute on chronic respiratory failure with hypoxia and   hypercapnia.     Query created by: Tres Neely on 2022 11:48 AM      Electronically signed by:  Huey Valderrama MD 2022 12:03 PM

## 2022-05-26 VITALS
BODY MASS INDEX: 30.09 KG/M2 | TEMPERATURE: 98.1 F | DIASTOLIC BLOOD PRESSURE: 54 MMHG | RESPIRATION RATE: 16 BRPM | SYSTOLIC BLOOD PRESSURE: 129 MMHG | WEIGHT: 222.13 LBS | OXYGEN SATURATION: 97 % | HEART RATE: 65 BPM | HEIGHT: 72 IN

## 2022-05-26 PROBLEM — Z99.81 OXYGEN DEPENDENT: Status: ACTIVE | Noted: 2022-05-26

## 2022-05-26 LAB
ANION GAP SERPL CALCULATED.3IONS-SCNC: 14 MMOL/L (ref 7–19)
BUN BLDV-MCNC: 54 MG/DL (ref 8–23)
CALCIUM SERPL-MCNC: 9.7 MG/DL (ref 8.8–10.2)
CHLORIDE BLD-SCNC: 102 MMOL/L (ref 98–111)
CO2: 26 MMOL/L (ref 22–29)
CREAT SERPL-MCNC: 2.9 MG/DL (ref 0.5–1.2)
GFR AFRICAN AMERICAN: 25
GFR NON-AFRICAN AMERICAN: 21
GLUCOSE BLD-MCNC: 73 MG/DL (ref 74–109)
POTASSIUM SERPL-SCNC: 3.5 MMOL/L (ref 3.5–5)
SODIUM BLD-SCNC: 142 MMOL/L (ref 136–145)

## 2022-05-26 PROCEDURE — 36415 COLL VENOUS BLD VENIPUNCTURE: CPT

## 2022-05-26 PROCEDURE — 99231 SBSQ HOSP IP/OBS SF/LOW 25: CPT | Performed by: INTERNAL MEDICINE

## 2022-05-26 PROCEDURE — 80048 BASIC METABOLIC PNL TOTAL CA: CPT

## 2022-05-26 PROCEDURE — 2580000003 HC RX 258: Performed by: HOSPITALIST

## 2022-05-26 PROCEDURE — 94640 AIRWAY INHALATION TREATMENT: CPT

## 2022-05-26 PROCEDURE — 6370000000 HC RX 637 (ALT 250 FOR IP): Performed by: HOSPITALIST

## 2022-05-26 PROCEDURE — 6370000000 HC RX 637 (ALT 250 FOR IP): Performed by: INTERNAL MEDICINE

## 2022-05-26 PROCEDURE — 2700000000 HC OXYGEN THERAPY PER DAY

## 2022-05-26 PROCEDURE — 6360000002 HC RX W HCPCS: Performed by: HOSPITALIST

## 2022-05-26 RX ORDER — METOLAZONE 2.5 MG/1
2.5 TABLET ORAL DAILY
Qty: 90 TABLET | Refills: 0 | Status: SHIPPED | OUTPATIENT
Start: 2022-05-26 | End: 2022-08-24

## 2022-05-26 RX ORDER — BUMETANIDE 1 MG/1
2 TABLET ORAL DAILY
Qty: 180 TABLET | Refills: 0 | Status: SHIPPED | OUTPATIENT
Start: 2022-05-26 | End: 2022-08-24

## 2022-05-26 RX ADMIN — FOLIC ACID 1 MG: 1 TABLET ORAL at 08:39

## 2022-05-26 RX ADMIN — IPRATROPIUM BROMIDE AND ALBUTEROL SULFATE 1 AMPULE: 2.5; .5 SOLUTION RESPIRATORY (INHALATION) at 14:08

## 2022-05-26 RX ADMIN — IPRATROPIUM BROMIDE AND ALBUTEROL SULFATE 1 AMPULE: 2.5; .5 SOLUTION RESPIRATORY (INHALATION) at 07:00

## 2022-05-26 RX ADMIN — ASPIRIN 81 MG: 81 TABLET, COATED ORAL at 08:38

## 2022-05-26 RX ADMIN — NIFEDIPINE 60 MG: 60 TABLET, EXTENDED RELEASE ORAL at 08:38

## 2022-05-26 RX ADMIN — SODIUM CHLORIDE, PRESERVATIVE FREE 10 ML: 5 INJECTION INTRAVENOUS at 08:47

## 2022-05-26 RX ADMIN — PANTOPRAZOLE SODIUM 40 MG: 40 TABLET, DELAYED RELEASE ORAL at 08:38

## 2022-05-26 RX ADMIN — FERROUS SULFATE TAB 325 MG (65 MG ELEMENTAL FE) 325 MG: 325 (65 FE) TAB at 08:38

## 2022-05-26 RX ADMIN — TAMSULOSIN HYDROCHLORIDE 0.8 MG: 0.4 CAPSULE ORAL at 08:38

## 2022-05-26 RX ADMIN — IPRATROPIUM BROMIDE AND ALBUTEROL SULFATE 1 AMPULE: 2.5; .5 SOLUTION RESPIRATORY (INHALATION) at 10:18

## 2022-05-26 RX ADMIN — ENOXAPARIN SODIUM 30 MG: 100 INJECTION SUBCUTANEOUS at 12:19

## 2022-05-26 RX ADMIN — GLIPIZIDE 10 MG: 5 TABLET ORAL at 08:38

## 2022-05-26 RX ADMIN — METOPROLOL SUCCINATE 100 MG: 50 TABLET, EXTENDED RELEASE ORAL at 08:38

## 2022-05-26 RX ADMIN — FUROSEMIDE 20 MG: 10 INJECTION, SOLUTION INTRAMUSCULAR; INTRAVENOUS at 08:45

## 2022-05-26 RX ADMIN — TROSPIUM CHLORIDE 20 MG: 20 TABLET, FILM COATED ORAL at 08:37

## 2022-05-26 RX ADMIN — AMIODARONE HYDROCHLORIDE 200 MG: 200 TABLET ORAL at 08:38

## 2022-05-26 RX ADMIN — ATORVASTATIN CALCIUM 20 MG: 40 TABLET, FILM COATED ORAL at 08:38

## 2022-05-26 RX ADMIN — BUMETANIDE 2 MG: 1 TABLET ORAL at 08:38

## 2022-05-26 NOTE — DISCHARGE SUMMARY
[place] [address]    DEPARTMENT OF HOSPITALIST MEDICINE      DISCHARGE SUMMARY:      PATIENT NAME:  Anurag Curtis  :  1940  MRN:  [id number]    Admission Date:   2022  4:22 PM Attending: Dat Elise MD   Discharge Date:   2022              PCP: Rodri Mirza MD  Length of Stay: 2 days     Chief Complaint on Admission:   Chief Complaint   Patient presents with    Shortness of Breath     On O2 at home. Worsening shortness of air and fatigue over the last few days        Consultants:     [place] TO CARDIOLOGY  IP CONSULT TO HEART FAILURE NURSE/COORDINATOR  IP CONSULT TO DIETITIAN  PALLIATIVE CARE EVAL  IP CONSULT TO NEPHROLOGY       Discharge Problem List:   Principal Problem:    Acute on chronic systolic (congestive) heart failure ([facility])  Active Problems:    Long term current use of antiarrhythmic drug    Back pain    Hyperlipidemia    PVD (peripheral vascular disease) ([facility])    Urge incontinence of urine    Oxygen dependent    Arthritis of knee    Essential hypertension    GERD (gastroesophageal reflux disease)    Palliative care patient    Benign prostatic hyperplasia with lower urinary tract symptoms    Type 2 diabetes mellitus with complication, without long-term current use of insulin ([facility])    Chronic kidney disease    Vitamin D deficiency    Anemia    Pacemaker    Class 1 obesity in adult    GREGORIO treated with BiPAP  Resolved Problems:    * No resolved hospital problems. [facility]  COURSE  AND  TREATMENT:      2022:  Patient is feeling better today. Shortness of breath has resolved. He has been seen and evaluated by both cardiology and nephrology and has been cleared for discharge. He is advised to continue with his oxygen and the regular meds at home. I have given him 90-day refill for both his diuretics.   He is advised to continue with his diuretics at home and follow-up closely with both cardiology and nephrology as an outpatient. 05/25/2022:  Patient responding well to diuresis and slowly improving. He received 40 mg IV Lasix yesterday. His home diuretics have been very situated. Cardiology evaluated the patient and ordered continuation with the IV Lasix for couple more doses.     05/24/2022:  HISTORY OF PRESENT ILLNESS:  Leonela Coreas an [de-identified] y. o. male.  He is a very pleasant gentleman with known CHF who ran out of his diuretics a few days ago and was unable to fill them. Mumtaz Ziegler is complaining of being weak and tired for the last few days, getting short of breath and noticing increased swelling in his legs.  He came to the ER for evaluation work-up was done which showed fluid overload and CHF exacerbation.  Patient was given 1 dose of IV Lasix 40 mg with good diuresis.  He is being admitted for medical management, IV diuresis, cardiology evaluation and further work-up. OBJECTIVE:  BP (!) 129/54   Pulse 65   Temp 98.1 °F (36.7 °C) (Temporal)   Resp 16   Ht 6' (1.829 m)   Wt 222 lb 2 oz (100.8 kg)   SpO2 97%   BMI 30.13 kg/m²       Heart: RRR   Lungs: Bilateral decreased air entry, bilateral rales   Abdomen: Soft, non-tender   Extremities: No edema   Neurologic: Alert and oriented   Skin: Warm and dry          Laboratory Data:  Recent Labs     05/24/22 1700 05/25/22  0159   WBC 11.4* 11.6*   HGB 12.7* 11.2*    135     Recent Labs     05/24/22  1700 05/25/22  0021 05/25/22  0159 05/26/22  1014     --  142 142   K 4.1 3.8 4.1 3.5   CL 99  --  103 102   CO2 23  --  23 26   BUN 51*  --  52* 54*   CREATININE 2.5*  --  2.5* 2.9*   GLUCOSE 210*  --  193* 73*     Recent Labs     05/24/22  1700   AST 26   ALT 26   BILITOT 0.9   ALKPHOS 118     Troponin T:   Recent Labs     05/24/22  1700 05/25/22  0159 05/25/22  0702   TROPONINI 0.04* 0.04* 0.04*     Pro-BNP: No results for input(s): BNP in the last 72 hours. INR: No results for input(s): INR in the last 72 hours.   UA:No results for input(s): NITRITE, COLORU, PHUR, LABCAST, WBCUA, RBCUA, MUCUS, TRICHOMONAS, YEAST, BACTERIA, CLARITYU, SPECGRAV, LEUKOCYTESUR, UROBILINOGEN, BILIRUBINUR, BLOODU, GLUCOSEU, AMORPHOUS in the last 72 hours. Invalid input(s): [facility]  A1C: No results for input(s): LABA1C in the last 72 hours. ABG:  Recent Labs     05/25/22  0021   PHART 7.310*   [id number] 49.0*   PO2ART 57.0*   YNA2VLN 24.7   BEART -1.9   HGBAE 11.8*   [id number] 88.1*   CARBOXHGBART 1.4            Impressions of imaging performed in 48 hours before discharge:    XR CHEST PORTABLE    Result Date: 5/24/2022  1. Appearance favors interstitial edema with developing perihilar and infrahilar alveolar edema. No consolidation or obvious pleural effusion. Signed by Dr Junie Dumont          Medication List      CHANGE how you take these medications    amiodarone 200 MG tablet  Commonly known as: CORDARONE  TAKE 1 TABLET BY MOUTH DAILY  What changed: when to take this     metOLazone 2.5 MG tablet  Commonly known as: ZAROXOLYN  Take 1 tablet by mouth daily  What changed: See the new instructions.      metoprolol succinate 100 MG extended release tablet  Commonly known as: TOPROL XL  Take 1 tablet by mouth daily  What changed: how much to take        CONTINUE taking these medications    aspirin 81 MG EC tablet  Take 1 tablet by mouth daily     atorvastatin 20 MG tablet  Commonly known as: LIPITOR  Take 1 tablet by mouth daily     bumetanide 1 MG tablet  Commonly known as: BUMEX  Take 2 tablets by mouth daily     ferrous sulfate 325 (65 Fe) MG tablet  Commonly known as: IRON 325  Take 1 tablet by mouth 2 times daily (with meals)     folic acid 1 MG tablet  Commonly known as: FOLVITE  Take 1 tablet by mouth daily     glipiZIDE 10 MG extended release tablet  Commonly known as: GLUCOTROL XL     mirabegron 25 MG Tb24  Commonly known as: MYRBETRIQ  Take 1 tablet by mouth daily     NIFEdipine 60 MG extended release tablet  Commonly known as: ADALAT CC  Take 1 tablet by mouth daily     OXYGEN  Inhale 2 L into the lungs continuous     pantoprazole 20 MG tablet  Commonly known as: PROTONIX     Senna Plus 8.6-50 MG per tablet  Generic drug: senna-docusate     tamsulosin 0.4 mg capsule  Commonly known as: FLOMAX  Take 2 capsules by mouth daily     Trulance 3 MG Tabs  Generic drug: Plecanatide  TAKE 1 TABLET BY MOUTH DAILY     UNABLE TO FIND     vitamin D 1.25 MG (57285 UT) Caps capsule  Commonly known as: ERGOCALCIFEROL  Take 1 capsule by mouth once a week        STOP taking these medications    cephALEXin 500 MG capsule  Commonly known as: David Nanny           Where to Get Your Medications      These medications were sent to [place] [address] #[id number] [facility], [address] [place] [address] [place] [address]    Phone: [phone]   · bumetanide 1 MG tablet  · metOLazone 2.5 MG tablet           ISSUES TO BE ADDRESSED AT HOSPITAL FOLLOW-UP VISIT:    Advised patient to follow-up closely with PCP upon discharge for management of chronic medical issues  Please see the detailed discharge directions delivered from earlier today! Condition on Discharge: gradually improving  Discharge Disposition: Home    Recommended Follow Up:  Susan Stanley MD  [address]  [phone]    On 6/8/2022  10:30 am with Miriam Romero, [address] [place] [address] 29.  Sharlene  [phone]    On 6/7/2022  Hospital Follow-up appointment @ 2:00. Followup Appointments Scheduled at Time of Discharge:  Future Appointments   Date Time Provider Hernando Whalen   6/8/2022 10:30 AM ELISABETH Andersen [facility]   6/22/2022  2:00 PM MD JAYLEN Diaz [facility]   7/13/2022 10:45 AM MD Rajeev Dietz [facility]   11/21/2022  1:00 PM MD JAYLEN Celaya [facility]        Discharge Instructions:   Please see the discharge paperwork.     Patient was seen at bedside today, and the examination shows improvement since yesterday. Detailed discharge directions delivered to the patient by myself and our nursing staff, who verbalizes understanding and is very happy and satisfied with the plan. Patient has been advised to continue all medications as prescribed and advised, and f/u with PCP within 1 week. Patient is stable from medical standpoint to be discharged. Total time spent during patient evaluation and assessment, discussion with the nurse/family, addressing discharge medications/scripts and coordination of care for safe discharge was in excess of 35 minutes.       Signed Electronically:    Connor Fuller MD  2:38 PM 5/26/2022

## 2022-05-26 NOTE — PROGRESS NOTES
Cardiology Daily Note Serafin Gr MD      Patient:  Loli Chávez  [id number]    Patient Active Problem List    Diagnosis Date Noted    Long term current use of antiarrhythmic drug 04/21/2022    Nonsustained ventricular tachycardia (HCC)     Acute on chronic systolic (congestive) heart failure ([address] 05/24/2022    Back pain 05/24/2022    Carotid arterial disease ([address] 05/24/2022    Epigastric discomfort 05/24/2022    Hyperlipidemia 05/24/2022    PVD (peripheral vascular disease) ([address] 05/24/2022    Tremor 05/24/2022    Urge incontinence of urine 05/24/2022    Chronic obstructive pulmonary disease with (acute) exacerbation ([address] 11/24/2021    Acute decompensated heart failure ([address] 11/23/2021    Hypoglycemia 08/25/2021    Acute kidney injury superimposed on CKD ([address] 08/25/2021    Constipation 08/25/2021    GREGORIO treated with BiPAP 04/13/2021    Chronic diastolic congestive heart failure ([address] 04/13/2021    SOB (shortness of breath) 04/13/2021    Class 1 obesity in adult 03/25/2021    Atrial fibrillation ([address] 03/16/2021    Vitamin D deficiency 03/16/2021    Anemia 03/16/2021    Alcohol use 03/16/2021    Pacemaker 03/16/2021    Recurrent pneumonia 03/06/2021    COPD with acute lower respiratory infection ([address] 02/24/2021    Chronic kidney disease 02/24/2021    Chronic midline low back pain without sciatica 01/22/2021    Acute respiratory failure ([address] 01/21/2021    Type 2 diabetes mellitus with complication, without long-term current use of insulin ([address] 01/21/2021    Weakness 01/21/2021    Other dysphagia 01/21/2021    Severe sepsis ([address] 01/18/2021    Chronic respiratory failure with hypoxia and hypercapnia ([address] 01/18/2021    Acute on chronic kidney failure ([address] 01/18/2021    Hypoxemia 16/28/7438    Metabolic acidosis 46/91/6429    Acidosis, metabolic, with respiratory acidosis 01/18/2021    History of bladder cancer 06/15/2020    Bladder cancer ([facility] 12/19/2018    Postoperative pain 12/19/2018    Epistaxis 11/06/2018    Acute blood loss anemia 11/06/2018    Melena 11/06/2018    Thrombocytopenia ([address] 11/06/2018    Hypocalcemia 11/06/2018    Pneumonia due to infectious organism 11/06/2018    Bleeding diathesis ([address] 11/05/2018    Benign prostatic hyperplasia with lower urinary tract symptoms     Acute renal failure ([address]     Shock liver     Acute liver failure without hepatic coma 10/23/2018    CHF (congestive heart failure) ([address] 10/23/2018    Bilateral pleural effusion 10/23/2018    Palliative care patient 10/23/2018    Arthritis of knee 10/15/2018    Essential hypertension 10/15/2018    Pure hypercholesterolemia 10/15/2018    Slow transit constipation 10/15/2018    Iron deficiency anemia 10/15/2018    GERD (gastroesophageal reflux disease) 10/15/2018    Primary osteoarthritis of left knee 10/14/2018    Carotid stenosis, asymptomatic, left 08/28/2018    Bilateral carotid artery stenosis 06/25/2018    Atherosclerosis of native artery of both lower extremities with intermittent claudication ([address] 06/25/2018    Colonic diverticular abscess 08/01/2017    Generalized abdominal pain     Diverticulitis of large intestine with perforation without bleeding 06/04/2017       Admit Date:  5/24/2022    Admission Problem List: Present on Admission:   Acute on chronic systolic (congestive) heart failure (HCC)   Urge incontinence of urine   Benign prostatic hyperplasia with lower urinary tract symptoms   Long term current use of antiarrhythmic drug   Back pain   Hyperlipidemia   PVD (peripheral vascular disease) ([address]   Anemia   Arthritis of knee   Chronic kidney disease   Essential hypertension   GERD (gastroesophageal reflux disease)   Pacemaker   Vitamin D deficiency      Cardiac Specific Data:  Specialty Problems        Cardiology Problems    Nonsustained ventricular tachycardia (HCC)        * (Principal) Acute on chronic systolic (congestive) heart failure (HCC)        Carotid arterial disease (HCC)        Hyperlipidemia        PVD (peripheral vascular disease) ([facility]        Atherosclerosis of native artery of both lower extremities with intermittent claudication (HCC)        Bilateral carotid artery stenosis        Carotid stenosis, asymptomatic, left        Essential hypertension        Pure hypercholesterolemia        CHF (congestive heart failure) (HCC)        Atrial fibrillation (HCC)        Chronic diastolic congestive heart failure ([facility]        Acute decompensated heart failure ([facility]              Subjective:  Mr. Lisy Negrete seen today resting comfortably dyspnea back to baseline blood pressure 129/61 heart rate 62. No other complaints or issues reported denies chest pain. Objective:   /61   Pulse 62   Temp 98.5 °F (36.9 °C) (Temporal)   Resp 18   Ht 6' (1.829 m)   Wt 222 lb 2 oz (100.8 kg)   SpO2 95%   BMI 30.13 kg/m²       Intake/Output Summary (Last 24 hours) at 5/26/2022 0921  Last data filed at 5/25/2022 1820  Gross per 24 hour   Intake 120 ml   Output --   Net 120 ml       Prior to Admission medications    Medication Sig Start Date End Date Taking?  Authorizing Provider   cephALEXin (KEFLEX) 500 MG capsule Take 1 capsule by mouth 2 times daily for 10 days  Patient not taking: Reported on 5/24/2022 5/23/22 6/2/22  [place] ELISABETH White CNP   metOLazone (ZAROXOLYN) 2.5 MG tablet TAKE 1 TABLET BY MOUTH EVERY DAY AS NEEDED FOR SWELLING 4/18/22   Historical Provider, MD   mirabegron (MYRBETRIQ) 25 MG TB24 Take 1 tablet by mouth daily 5/20/22   [facility] ELISABETH Davila CNP   amiodarone (CORDARONE) 200 MG tablet TAKE 1 TABLET BY MOUTH DAILY  Patient taking differently: Take 200 mg by mouth 2 times daily  5/18/22   ELISABETH Escobar   TRULANCE 3 MG TABS TAKE 1 TABLET BY MOUTH DAILY 3/11/22   ELISABETH Thompson NP   UNABLE TO FIND Take 20 mg by mouth daily trosopium    Historical Provider, MD   tamsulosin (FLOMAX) 0.4 MG capsule Take 2 capsules by mouth daily 12/13/21   Wicho Blue MD   pantoprazole (PROTONIX) 20 MG tablet Take 40 mg by mouth daily     Historical Provider, MD   SENNA PLUS 8.6-50 MG per tablet  10/26/21   Historical Provider, MD   glipiZIDE (GLUCOTROL XL) 10 MG extended release tablet 2.5 mg daily  10/19/21   Historical Provider, MD   bumetanide (BUMEX) 1 MG tablet Take 2 mg by mouth daily     Historical Provider, MD   aspirin 81 MG EC tablet Take 1 tablet by mouth daily  Patient not taking: Reported on 5/24/2022 3/29/21   Ana Batista MD   atorvastatin (LIPITOR) 20 MG tablet Take 1 tablet by mouth daily 3/29/21   Ana Batista MD   metoprolol succinate (TOPROL XL) 100 MG extended release tablet Take 1 tablet by mouth daily  Patient taking differently: Take 10 mg by mouth daily  3/29/21   Ana Batista MD   NIFEdipine (ADALAT CC) 60 MG extended release tablet Take 1 tablet by mouth daily 3/29/21   Ana Batista MD   ferrous sulfate (IRON 325) 325 (65 Fe) MG tablet Take 1 tablet by mouth 2 times daily (with meals)  Patient not taking: Reported on 5/24/2022 3/29/21   Ana Batista MD   folic acid (FOLVITE) 1 MG tablet Take 1 tablet by mouth daily 3/29/21   Ana Batista MD   vitamin D (ERGOCALCIFEROL) 1.25 MG (58014 UT) CAPS capsule Take 1 capsule by mouth once a week 3/29/21   Ana Batista MD   OXYGEN Inhale 2 L into the lungs continuous 3/29/21   Ana Batista MD        ipratropium-albuterol  1 ampule Inhalation Q4H WA    enoxaparin  30 mg SubCUTAneous Lunch    aspirin  81 mg Oral Daily    atorvastatin  20 mg Oral Daily    metoprolol succinate  100 mg Oral Daily    NIFEdipine  60 mg Oral Daily    ferrous sulfate  325 mg Oral BID WC    folic acid  1 mg Oral Daily    vitamin D  50,000 Units Oral Weekly    bumetanide  2 mg Oral Daily    glipiZIDE  10 mg Oral QAM AC    pantoprazole  40 mg Oral Daily    tamsulosin  0.8 mg Oral Daily    Plecanatide  1 tablet Oral Daily    amiodarone  200 mg Oral BID  [Held by provider] metOLazone  5 mg Oral Daily    trospium  20 mg Oral BID AC    sodium chloride flush  10 mL IntraVENous 2 times per day       TELEMETRY: Sinus     Physical Exam:      Physical Exam      General:  Awake, alert, NAD  Skin:  Warm and dry  Neck:  no jvd , no carotid bruits  Chest:  Clear to auscultation, no wheezing or rales  Cardiovascular:  RRR F5W6 no murmurs, clicks, gallups, or rubs  Abdomen:  Soft nontender, nondistended, bowel sounds present  Extremities:  Edema: none      Lab Data:  CBC:   Recent Labs     05/24/22  1700 05/25/22  0159   WBC 11.4* 11.6*   HGB 12.7* 11.2*   HCT 40.9* 35.8*   MCV 93.2 93.2    135     BMP:   Recent Labs     05/24/22  1700 05/25/22  0021 05/25/22  0159     --  142   K 4.1 3.8 4.1   CL 99  --  103   CO2 23  --  23   PHOS 3.1  --   --    BUN 51*  --  52*   CREATININE 2.5*  --  2.5*     LIVER PROFILE:   Recent Labs     05/24/22  1700   AST 26   ALT 26   BILITOT 0.9   ALKPHOS 118     PT/INR: No results for input(s): PROTIME, INR in the last 72 hours. APTT: No results for input(s): APTT in the last 72 hours. BNP:  No results for input(s): BNP in the last 72 hours. CK, CKMB, Troponin: @LABRCNT (CKTOTAL:3, CKMB:3, TROPONINI:3)@    IMAGING:  ECHO Complete 2D W Doppler W Color    Result Date: 5/25/2022  Transthoracic Echocardiography Report (TTE)  Demographics   Patient Name  Halina Colón Date of Study          05/25/2022   MRN           [id number]             Gender                 Male   Date of Birth 1940         Room Number            MHL-0717   Age           [de-identified] year(s)   Height:       72 inches          Referring Physician    Charly Gonzalez MD   Weight:       222 pounds         Sonographer   BSA:          2.23 m^2           Interpreting Physician Charly Gonzalez MD   BMI:          30.11 kg/m^2  Procedure Type of Study   TTE procedure:ECHO NO CONTRAST WITH DOP/COLR.   Study Location: Echo Lab Technical Quality: Adequate visualization Patient Status: Inpatient Rhythm: Within normal limits HR: 63 bpm BP: 134/59 mmHg Indications:Congestive heart failure. Conclusions   Summary  Mitral valve leaflets are mildly thickened with preserved leaflet  mobility. Mild mitral regurgitation is present. Mild aortic stenosis. Mean gradient 8 mm hg  Tricuspid valve is structurally normal.  Left ventricular ejection fraction is visually estimated at 50-55%. Normal left ventricular wall thickness. No regional wall motion abnormalities. Signature   ----------------------------------------------------------------  Electronically signed by Alessia Chase MD(Interpreting  physician) on 05/25/2022 04:53 PM  ----------------------------------------------------------------   Findings   Mitral Valve  Mitral valve leaflets are mildly thickened with preserved leaflet  mobility. Mild mitral regurgitation is present. Aortic Valve  Mild aortic stenosis. Mean gradient 8 mm hg   Tricuspid Valve  Tricuspid valve is structurally normal.   Pulmonic Valve  The pulmonic valve was not well visualized . Left Atrium  Normal size left atrium. Left Ventricle  Left ventricular ejection fraction is visually estimated at 50-55%. Normal left ventricular wall thickness. No regional wall motion abnormalities. Right Atrium  Normal right atrial dimension with no evidence of thrombus or mass noted. Right Ventricle  Normal right ventricular size with preserved RV function. Pericardial Effusion  No evidence of significant pericardial effusion is noted. Pleural Effusion  No evidence of pleural effusion. Allergies   - Other allergy:(Eliquis). - Phenergan. 2D Measurements and Calculations(cm)   LVIDd: 5.38 cm                      LVIDs: 3.94 cm  IVSd: 1.23 cm  LVPWd: 0.98 cm                      AO Root Dimension: 2.4 cm  Rt. Vent.  Dimension: 2.41 cm        LA Dimension: 4.3 cm  % Ejection Fraction: 55 %           LA Area: 18.3 cm^2  LA Volume: 43.7 ml                  LV Systolic dimension: 3.94cm  LA Volume Index: 20 ml/m^2          LV PW diastolic: 0.29JM  LV dimension: 5.38 cm               LVOT diameter: 2 cm                                      RA Systolic pressure: 3mmHg                                      RV Diastolic dimension: 8.93ZX  Cardic Output (CO): 5.46l/min  Doppler Measurements and Calculations   AV Peak Velocity:196 cm/s              MV Peak E-Wave: 125 cm/s  AV Mean Velocity:135 cm/s              MV Peak A-Wave: 74.1 cm/s  AV Peak Gradient: 15.37 mmHg           MV E/A Ratio: 1.69 %  AV Mean Gradient: 8 mmHg               MV Mean velocity: 71.8cm/s  AV Area (Continuity):1.92 cm^2         MV Peak Gradient: 6.25 mmHg  AV VTI:45.1 cm/s                       MV Mean gradient: 2 mmHg                                         MV P1/2t: 96 msec  Estimated RAP:3 mmHg                   MVA by PHT2.29 cm^2   MV E' septal velocity: 7.4cm/s  MV E' lateral velocity:9.25 cm/s      XR CHEST PORTABLE    Result Date: 5/24/2022  XR CHEST PORTABLE 5/24/2022 5:11 PM HISTORY: Shortness of breath  Technique: Single AP view of the chest COMPARISONS: Chest exam dated 11/23/2021 FINDINGS: Mild cardiomegaly. Central pulmonary vascular congestion. Bilateral interstitial coarsening and patchy perihilar and infrahilar opacities, appearance favoring a developing pulmonary edema. No dense consolidation. Elevation of the right hemidiaphragm which is likely chronic. No pleural effusion or pneumothorax. Left chest wall dual chamber pacemaker appears stable. No acute bony abnormality. 1. Appearance favors interstitial edema with developing perihilar and infrahilar alveolar edema. No consolidation or obvious pleural effusion. Signed by Dr Cadence Banda        Assessment:  1. Complaints of increased shortness of breath likely due to inadvertent discontinuation of diuretics for 7 to 8 days  2. Acute on chronic diastolic congestive heart failure  3.  Long-term use of an antiarrhythmic drug  4. Hyperlipidemia  5. Peripheral vascular disease  6. Urinary incontinence  7. Hypertension  8. Gastroesophageal reflux disease  9. Benign prostatic hyperplasia  10. Chronic kidney disease  11. Anemia  12. Pacemaker  13. Carotid artery disease  14. Nonsustained ventricular tachycardia  15. Tremor  16. : Diverticular disease  17. Osteoarthritis  18. History of epistaxis  19. Thrombocytopenia  20. History of bladder cancer  21. Diabetes mellitus type 2  22. COPD  21. History atrial fibrillation  24. History of alcohol abuse  25. Obstructive sleep apnea treated with BiPAP  26. History hematuria  27. CTA pulmonary 3/6/2021 no evidence of pulmonary embolism small bilateral pleural effusions interstitial pulmonary edema greater than left consolidation suspicious for pneumonia  28. Echo 21/10/6631 normal LV systolic function EF 85% mild MR  29. Cardiac catheterization 3/6/2021 100% right coronary artery medical management recommended       Plan:  1.  Continue current management stable from a cardiovascular standpoint could be discharged today    Negro Miguel MD, MD 5/26/2022 9:07 AM

## 2022-05-26 NOTE — PROGRESS NOTES
Nephrology ([facility]) Consult Note      Patient:  Amber Randhawa  YOB: 1940  Date of Service: 5/26/2022  MRN: [id number]   Acct: [de-identified]   Primary Care Physician: Louise Watkins MD  Advance Directive: Full Code  Admit Date: 5/24/2022       Hospital Day: 2  Referring Provider: Dee Wong MD    Patient independently seen and examined, Chart, Consults, Notes, Operative notes, Labs, Cardiology, and Radiology studies reviewed as available. Subjective:  Amber Randhawa is a [de-identified] y.o. male for whom we were consulted for evaluation and treatment of chronic kidney disease stage IV. Patient has followed with our office for some time after an episode of acute kidney injury which required short-term dialysis. Baseline GFR of 22 and alert system from 12/5/2021. Was admitted for evaluation of shortness of air and edema. He was started on IV Lasix with cardiology evaluation. He was feeling better this afternoon and was off oxygen. He noted that he had been out of his outpatient diuretic for about a week prior to admission as the pharmacy was out of that particular agent. Also note some issues with urinary incontinence and recalled that he had follow-up with Dr. Joel Ivory in the past.  Denied dysuria hematuria, hemoptysis or rash. Today, no overnight events. Patient notes continued increased urine output with diuretic usage. Denies current chest pain, shortness of air at rest, nausea vomiting. He has oxygen by nasal cannula but not on his face at time of examination.     Allergies:  Eliquis [apixaban] and Promethazine hcl    Medicines:  Current Facility-Administered Medications   Medication Dose Route Frequency Provider Last Rate Last Admin    ipratropium-albuterol (DUONEB) nebulizer solution 1 ampule  1 ampule Inhalation Q4H LAWSON Neff, DO   1 ampule at 05/26/22 1408    enoxaparin Sodium (LOVENOX) injection 30 mg  30 mg SubCUTAneous Carlyle Song MD 30 mg at 05/26/22 1219    aspirin EC tablet 81 mg  81 mg Oral Daily Antonio Perez MD   81 mg at 05/26/22 0838    atorvastatin (LIPITOR) tablet 20 mg  20 mg Oral Daily Antonio Perez MD   20 mg at 05/26/22 1014    metoprolol succinate (TOPROL XL) extended release tablet 100 mg  100 mg Oral Daily Antonio Perez MD   100 mg at 05/26/22 0838    NIFEdipine (PROCARDIA XL) extended release tablet 60 mg  60 mg Oral Daily Antonio Perez MD   60 mg at 05/26/22 0838    ferrous sulfate (IRON 325) tablet 325 mg  325 mg Oral BID WC Antonio Perez MD   325 mg at 70/32/75 8853    folic acid (FOLVITE) tablet 1 mg  1 mg Oral Daily Antonio Perez MD   1 mg at 05/26/22 0839    vitamin D (ERGOCALCIFEROL) capsule 50,000 Units  50,000 Units Oral Weekly Antonio Perez MD   50,000 Units at 05/25/22 0915    bumetanide (BUMEX) tablet 2 mg  2 mg Oral Daily Antonio Perez MD   2 mg at 05/26/22 0838    sennosides-docusate sodium (SENOKOT-S) 8.6-50 MG tablet 1 tablet  1 tablet Oral Nightly PRN Antonio Perez MD        glipiZIDE (GLUCOTROL) tablet 10 mg  10 mg Oral QAM AC Antonio Perez MD   10 mg at 05/26/22 0838    pantoprazole (PROTONIX) tablet 40 mg  40 mg Oral Daily Antonio Perez MD   40 mg at 05/26/22 0838    tamsulosin (FLOMAX) capsule 0.8 mg  0.8 mg Oral Daily Antonio Perez MD   0.8 mg at 05/26/22 0838    Plecanatide TABS 1 tablet  1 tablet Oral Daily Antonio Perez MD        amiodarone (CORDARONE) tablet 200 mg  200 mg Oral BID Antonio Perez MD   200 mg at 05/26/22 0838    [Held by provider] metOLazone (ZAROXOLYN) tablet 5 mg  5 mg Oral Daily Antonio Perez MD   5 mg at 05/25/22 0916    trospium (SANCTURA) tablet 20 mg  20 mg Oral BID AC Antonio Perez MD   20 mg at 05/26/22 0837    sodium chloride flush 0.9 % injection 10 mL  10 mL IntraVENous 2 times per day Antonio Perez MD   10 mL at 05/26/22 0847    sodium chloride flush 0.9 % injection 10 mL  10 mL IntraVENous PRN Antonio Perez MD        0.9 % sodium chloride infusion   IntraVENous PRN Antonio MD Chris        promethazine (PHENERGAN) tablet 12.5 mg  12.5 mg Oral Q6H PRN Antonio Perez MD        Or    ondansetron (ZOFRAN) injection 4 mg  4 mg IntraVENous Q6H PRN Antonio Perez MD        polyethylene glycol (GLYCOLAX) packet 17 g  17 g Oral Daily PRN Antonio Perez MD        acetaminophen (TYLENOL) tablet 650 mg  650 mg Oral Q6H PRN Antonio Perez MD   650 mg at 05/25/22 2006    Or    acetaminophen (TYLENOL) suppository 650 mg  650 mg Rectal Q6H PRN Antonio Perez MD           Past Medical History:  Past Medical History:   Diagnosis Date    Acute liver failure without hepatic coma 10/23/2018    Back pain     \"with tired legs as a result\"    Bladder cancer ([address] 12/19/2018    Blood circulation, collateral     Carotid arterial disease ([address]     recent surgery    CKD (chronic kidney disease), 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/2018    Constipation     COPD with acute lower respiratory infection ([address] 02/24/2021    Epigastric discomfort     GERD (gastroesophageal reflux disease)     Hyperlipidemia     Hypertension     Hypertension     Pacemaker     Palliative care patient 10/23/2018    Pneumonia due to infectious organism 11/06/2018    Primary osteoarthritis of left knee 10/14/2018    PVD (peripheral vascular disease) ([address]     Tremor     Tremor on Right side x 1-2 weeks per stepdaughter    Type 2 diabetes mellitus with complication, without long-term current use of insulin ([address] 01/21/2021       Past Surgical History:  Past Surgical History:   Procedure Laterality Date    BACK SURGERY      CARDIAC CATHETERIZATION  03/23/2021    100% RCA    COLONOSCOPY  2007?     CYSTOSCOPY Bilateral 12/19/2018    CYSTOSCOPY, BIOSPY FULGURATION OF BLADDER TUMOR POSSIBLE TURBT, RETROGRADE PYELOGRAM performed by Raffy Quezada MD at [address] COLONOSCOPY FLX DX W/COLLJ SPEC WHEN PFRMD N/A 09/11/2017    Dr Mauricio Farias internal hemorrhoids, diverticular disease-HP-No recall (age)   [facility] [place] REVISE MEDIAN N/CARPAL TUNNEL SURG Left 07/18/2018    OPEN CARPAL TUNNEL RELEASE performed by Mahin Watts MD at [address] Left 08/28/2018    LEFT CAROTID ENDARTERECTOMY WITH VEIN PATCH ANGIOPLASTY AND COMPLETION ANGIOGRAM performed by Desi Hancock MD at [address] Left 10/15/2018    LEFT COMPLEX TOTAL KNEE ARTHROPLASTY performed by Mahin Watts MD at [facility] ENDOSCOPY N/A 11/18/2021    Dr Cindia Schlatter, Sub prep lg amount of solid and semisolid food/Medication in stomach body/antrum/pylorus, stomach lumen appeared to distended w air insufflation and collapse upon suction,   inadequate exam sugg of gastroparesis, repeat in 10-14 days    UPPER GASTROINTESTINAL ENDOSCOPY N/A 01/06/2022    Dr Cindia Schlatter, distal stomach body likely GIST nodule-Submucosal fundic body gastric mucosa, Benign, (-)Hpylori, (-)Sprue    UPPER GASTROINTESTINAL ENDOSCOPY N/A 03/07/2022    Dr Cindia Schlatter, EUS submucosal gastric nodule, otherwise normal, 1 year recall    UPPER GASTROINTESTINAL ENDOSCOPY N/A 03/07/2022    Dr Cindia Schlatter, W EUS,  nodular area at end of rugal gold in distal stomach body    VASCULAR SURGERY  04/21/2015    Aditya LIU. Ultrasound guided access of left common femoral artery. Aortogram.Diagnostic right lower extremity arteriogram.Radiologic supervision and interpretation.  VASCULAR SURGERY  01/13/2015    Aditya Mota M.D Atherectomy,angioplasty,and stenting of left superficial femoral artery.  VASCULAR SURGERY  03/11/2014    Aditya Mota M.D. Ultrasound-guided access of right common femoral artery. Aortogram.Left lower extremity arteriogram.Atherectomy and angioplasty of left superficial femoral artery. Radiologic supervision and interpretation.  VASCULAR SURGERY  01/18/2013    Aditya LIU. Aortogram.Multistation arteriogram right lower extremity. Laser atherectomy and angioplasty of right superficial femoral artery. Selective catheterization of right tibioperoneal trunk. Angioplasty of peroneal artery and tibioperoneal trunk.  VASCULAR SURGERY  10/30/2018    [facility]. Ultrasound guided cannulation of right internal vein. Placement of right internal jugular vein tunneled dialysis catheter bard equistream xk 23cm tip to cuff    VASCULAR SURGERY  2018    [facility]. Removal of tunneled dilaysis catheter right internal jugular vein. Family History  Family History   Problem Relation Age of Onset    Colon Cancer Father     Diabetes Brother     Colon Polyps Neg Hx     Liver Cancer Neg Hx     Liver Disease Neg Hx     Esophageal Cancer Neg Hx     Rectal Cancer Neg Hx     Stomach Cancer Neg Hx        Social History  Social History     Socioeconomic History    Marital status:      Spouse name: Not on file    Number of children: Not on file    Years of education: Not on file    Highest education level: Not on file   Occupational History    Not on file   Tobacco Use    Smoking status: Former Smoker     Packs/day: 2.00     Years: 48.00     Pack years: 96.00     Types: Cigarettes     Quit date: 6/3/2003     Years since quittin.9    Smokeless tobacco: Never Used   Vaping Use    Vaping Use: Never used   Substance and Sexual Activity    Alcohol use: Yes     Alcohol/week: 12.0 standard drinks     Types: 12 Glasses of wine per week     Comment: 1 glasses of wine every night    Drug use: No    Sexual activity: Yes     Partners: Female   Other Topics Concern    Not on file   Social History Narrative    Not on file     Social Determinants of Health     Financial Resource Strain:     Difficulty of Paying Living Expenses: Not on file   Food Insecurity:     Worried About Running Out of Food in the Last Year: Not on file    Ankit of Food in the Last Year: Not on file   Transportation Needs:     Lack of Transportation (Medical): Not on file    Lack of Transportation (Non-Medical):  Not on file   Physical Activity:     Days of Exercise per Week: Not on file    Minutes of Exercise per Session: Not on file   Stress:     Feeling of Stress : Not on file   Social Connections:     Frequency of Communication with Friends and Family: Not on file    Frequency of Social Gatherings with Friends and Family: Not on file    Attends Restoration Services: Not on file    Active Member of [address] [place] [address] or Organizations: Not on file    Attends Club or Organization Meetings: Not on file    Marital Status: Not on file   Intimate Partner Violence:     Fear of Current or Ex-Partner: Not on file    Emotionally Abused: Not on file    Physically Abused: Not on file    Sexually Abused: Not on file   Housing Stability:     Unable to Pay for Housing in the Last Year: Not on file    Number of [place] in the Last Year: Not on file    Unstable Housing in the Last Year: Not on file         Review of Systems:  History obtained from chart review and the patient  General ROS: No fever or chills  Respiratory ROS: No cough, +shortness of breath, wheezing  Cardiovascular ROS: No chest pain or palpitations  Gastrointestinal ROS: No abdominal pain or melena  Genito-Urinary ROS: No dysuria or hematuria  Musculoskeletal ROS: No joint pain or swelling   14 point ROS reviewed with the patient and negative except as noted above and in the HPI unless unable to obtain.     Objective:  Patient Vitals for the past 24 hrs:   BP Temp Temp src Pulse Resp SpO2   05/26/22 1054 (!) 129/54 98.1 °F (36.7 °C) Temporal 65 16 97 %   05/26/22 0551 129/61 98.5 °F (36.9 °C) Temporal 62 18 95 %   05/25/22 1751 (!) 142/74 97.9 °F (36.6 °C) Temporal 65 18 94 %       Intake/Output Summary (Last 24 hours) at 5/26/2022 1430  Last data filed at 5/26/2022 1230  Gross per 24 hour   Intake 360 ml   Output 400 ml   Net -40 ml     General: awake/alert   Chest:  clear to auscultation bilaterally  CVS: regular rate and rhythm  Abdominal: soft, nontender, normal bowel sounds  Extremities: no cyanosis, ble edema  Skin: warm and dry without rash      Labs:  BMP:   Recent Labs     05/24/22  1700 05/25/22  0021 05/25/22  0159 05/26/22  1014     --  142 142   K 4.1 3.8 4.1 3.5   CL 99  --  103 102   CO2 23  --  23 26   PHOS 3.1  --   --   --    BUN 51*  --  52* 54*   CREATININE 2.5*  --  2.5* 2.9*   CALCIUM 9.9  --  9.6 9.7     CBC:   Recent Labs     05/24/22  1700 05/25/22  0159   WBC 11.4* 11.6*   HGB 12.7* 11.2*   HCT 40.9* 35.8*   MCV 93.2 93.2    135     LIVER PROFILE:   Recent Labs     05/24/22  1700   AST 26   ALT 26   BILITOT 0.9   ALKPHOS 118     PT/INR: No results for input(s): PROTIME, INR in the last 72 hours. APTT: No results for input(s): APTT in the last 72 hours. BNP:  No results for input(s): BNP in the last 72 hours. Ionized Calcium:No results for input(s): IONCA in the last 72 hours. Magnesium:  Recent Labs     05/24/22  1700   MG 2.2     Phosphorus:  Recent Labs     05/24/22  1700   PHOS 3.1     HgbA1C: No results for input(s): LABA1C in the last 72 hours. Hepatic:   Recent Labs     05/24/22  1700   ALKPHOS 118   ALT 26   AST 26   PROT 7.8   BILITOT 0.9   LABALBU 4.8     Lactic Acid: No results for input(s): LACTA in the last 72 hours. Troponin: No results for input(s): CKTOTAL, CKMB, TROPONINT in the last 72 hours. ABGs: No results for input(s): PH, PCO2, PO2, HCO3, O2SAT in the last 72 hours. CRP:  No results for input(s): CRP in the last 72 hours. Sed Rate:  No results for input(s): SEDRATE in the last 72 hours. Cultures:   No results for input(s): CULTURE in the last 72 hours. No results for input(s): BCLibraer in the last 72 hours. No results for input(s): CXSURG in the last 72 hours.     Radiology reports as per the Radiologist  Radiology: XR CHEST PORTABLE    Result Date: 5/24/2022  XR CHEST PORTABLE 5/24/2022 5:11 PM HISTORY: Shortness of breath  Technique: Single AP view of the chest COMPARISONS: Chest exam dated 11/23/2021 FINDINGS: Mild cardiomegaly. Central pulmonary vascular congestion. Bilateral interstitial coarsening and patchy perihilar and infrahilar opacities, appearance favoring a developing pulmonary edema. No dense consolidation. Elevation of the right hemidiaphragm which is likely chronic. No pleural effusion or pneumothorax. Left chest wall dual chamber pacemaker appears stable. No acute bony abnormality. 1. Appearance favors interstitial edema with developing perihilar and infrahilar alveolar edema. No consolidation or obvious pleural effusion. Signed by Dr Doss Hence       Assessment   Chronic kidney disease stage IV  Diabetes type 2 with diabetic nephropathy  Acute on chronic diastolic congestive heart failure  Anemia    Plan:  Discussed patient, nursing  Work-up reviewed to date  Monitor labs  Diuresing well without worsening renal function at this point, would continue as needed  Encouraged patient to continue consistent outpatient diuretic management as recommended by outpatient physicians and please contact my office earlier primary care if he is running out of an agent in the future  [id number] [address] for d/c when ok with others    Thank you for the consult, we appreciate the opportunity to provide care to your patients. Feel free to contact me if I can be of any further assistance.       Luc Sarabia MD  05/26/22  2:30 PM

## 2022-05-27 NOTE — CONSULTS
Pt here with Lt bundle branch block. No diastolic dysfunction noted on 2d echo and penny EF but echo from 11/21 did note G2DD. Pt. Does see Dr Bradford Preciado who does not utilize CHF clinic. Pt will be seeing APRN on 6/8 and will monitor CHF symptoms and issues.

## 2022-05-28 ENCOUNTER — HOSPITAL ENCOUNTER (INPATIENT)
Age: 82
LOS: 2 days | Discharge: HOME | DRG: 638 | End: 2022-05-31
Attending: EMERGENCY MEDICINE | Admitting: INTERNAL MEDICINE
Payer: MEDICARE

## 2022-05-28 DIAGNOSIS — N18.9 ACUTE KIDNEY INJURY SUPERIMPOSED ON CKD (HCC): Primary | ICD-10-CM

## 2022-05-28 DIAGNOSIS — E16.0 HYPOGLYCEMIA SECONDARY TO SULFONYLUREA, ACCIDENTAL OR UNINTENTIONAL, INITIAL ENCOUNTER: ICD-10-CM

## 2022-05-28 DIAGNOSIS — N17.9 ACUTE KIDNEY INJURY SUPERIMPOSED ON CKD (HCC): Primary | ICD-10-CM

## 2022-05-28 DIAGNOSIS — T38.3X1A HYPOGLYCEMIA SECONDARY TO SULFONYLUREA, ACCIDENTAL OR UNINTENTIONAL, INITIAL ENCOUNTER: ICD-10-CM

## 2022-05-28 LAB
ALBUMIN SERPL-MCNC: 3.9 G/DL (ref 3.5–5.2)
ALP BLD-CCNC: 134 U/L (ref 40–130)
ALT SERPL-CCNC: 48 U/L (ref 5–41)
ANION GAP SERPL CALCULATED.3IONS-SCNC: 14 MMOL/L (ref 7–19)
AST SERPL-CCNC: 52 U/L (ref 5–40)
BASOPHILS ABSOLUTE: 0 K/UL (ref 0–0.2)
BASOPHILS RELATIVE PERCENT: 0.3 % (ref 0–1)
BILIRUB SERPL-MCNC: 0.4 MG/DL (ref 0.2–1.2)
BUN BLDV-MCNC: 64 MG/DL (ref 8–23)
CALCIUM SERPL-MCNC: 9.2 MG/DL (ref 8.8–10.2)
CHLORIDE BLD-SCNC: 100 MMOL/L (ref 98–111)
CO2: 28 MMOL/L (ref 22–29)
CREAT SERPL-MCNC: 3.4 MG/DL (ref 0.5–1.2)
EOSINOPHILS ABSOLUTE: 0 K/UL (ref 0–0.6)
EOSINOPHILS RELATIVE PERCENT: 0 % (ref 0–5)
GFR AFRICAN AMERICAN: 21
GFR NON-AFRICAN AMERICAN: 17
GLUCOSE BLD-MCNC: 129 MG/DL (ref 70–99)
GLUCOSE BLD-MCNC: 36 MG/DL
GLUCOSE BLD-MCNC: 36 MG/DL (ref 70–99)
GLUCOSE BLD-MCNC: 77 MG/DL (ref 74–109)
GLUCOSE BLD-MCNC: 90 MG/DL (ref 70–99)
HCT VFR BLD CALC: 38.2 % (ref 42–52)
HEMOGLOBIN: 12 G/DL (ref 14–18)
IMMATURE GRANULOCYTES #: 0.2 K/UL
LYMPHOCYTES ABSOLUTE: 0.7 K/UL (ref 1.1–4.5)
LYMPHOCYTES RELATIVE PERCENT: 5.8 % (ref 20–40)
MCH RBC QN AUTO: 28.9 PG (ref 27–31)
MCHC RBC AUTO-ENTMCNC: 31.4 G/DL (ref 33–37)
MCV RBC AUTO: 92 FL (ref 80–94)
MONOCYTES ABSOLUTE: 0.7 K/UL (ref 0–0.9)
MONOCYTES RELATIVE PERCENT: 6.4 % (ref 0–10)
NEUTROPHILS ABSOLUTE: 9.9 K/UL (ref 1.5–7.5)
NEUTROPHILS RELATIVE PERCENT: 86.1 % (ref 50–65)
PDW BLD-RTO: 13.3 % (ref 11.5–14.5)
PERFORMED ON: ABNORMAL
PERFORMED ON: ABNORMAL
PERFORMED ON: NORMAL
PLATELET # BLD: 181 K/UL (ref 130–400)
PMV BLD AUTO: 9.8 FL (ref 9.4–12.4)
POTASSIUM SERPL-SCNC: 3.3 MMOL/L (ref 3.5–5)
RBC # BLD: 4.15 M/UL (ref 4.7–6.1)
SODIUM BLD-SCNC: 142 MMOL/L (ref 136–145)
TOTAL PROTEIN: 6.9 G/DL (ref 6.6–8.7)
WBC # BLD: 11.5 K/UL (ref 4.8–10.8)

## 2022-05-28 PROCEDURE — 99285 EMERGENCY DEPT VISIT HI MDM: CPT

## 2022-05-28 PROCEDURE — 36415 COLL VENOUS BLD VENIPUNCTURE: CPT

## 2022-05-28 PROCEDURE — 85025 COMPLETE CBC W/AUTO DIFF WBC: CPT

## 2022-05-28 PROCEDURE — 96376 TX/PRO/DX INJ SAME DRUG ADON: CPT

## 2022-05-28 PROCEDURE — 83735 ASSAY OF MAGNESIUM: CPT

## 2022-05-28 PROCEDURE — 2500000003 HC RX 250 WO HCPCS: Performed by: EMERGENCY MEDICINE

## 2022-05-28 PROCEDURE — 82947 ASSAY GLUCOSE BLOOD QUANT: CPT

## 2022-05-28 PROCEDURE — 80053 COMPREHEN METABOLIC PANEL: CPT

## 2022-05-28 RX ORDER — DEXTROSE MONOHYDRATE 25 G/50ML
25 INJECTION, SOLUTION INTRAVENOUS ONCE
Status: COMPLETED | OUTPATIENT
Start: 2022-05-28 | End: 2022-05-28

## 2022-05-28 RX ADMIN — DEXTROSE MONOHYDRATE 25 G: 25 INJECTION, SOLUTION INTRAVENOUS at 23:31

## 2022-05-29 PROBLEM — N17.9 ACUTE RENAL FAILURE (ARF) (HCC): Status: ACTIVE | Noted: 2022-05-29

## 2022-05-29 LAB
ACINETOBACTER CALCOAC BAUMANNII COMPLEX BY PCR: NOT DETECTED
BACTEROIDES FRAGILIS BY PCR: NOT DETECTED
CANDIDA ALBICANS BY PCR: NOT DETECTED
CANDIDA AURIS BY PCR: NOT DETECTED
CANDIDA GLABRATA BY PCR: NOT DETECTED
CANDIDA KRUSEI BY PCR: NOT DETECTED
CANDIDA PARAPSILOSIS BY PCR: NOT DETECTED
CANDIDA TROPICALIS BY PCR: NOT DETECTED
CARBAPENEM RESISTANCE IMP GENE BY PCR: ABNORMAL
CARBAPENEM RESISTANCE KPC BY PCR: ABNORMAL
CARBAPENEM RESISTANCE NDM GENE BY PCR: ABNORMAL
CARBAPENEM RESISTANCE OXA-48 GENE BY PCR: ABNORMAL
CARBAPENEM RESISTANCE VIM GENE BY PCR: ABNORMAL
CEPHALOSPORIN RESISTANCE CTX-M GENE BY PCR: ABNORMAL
COLISTIN RESISTANCE MCR-1 GENE BY PCR: ABNORMAL
CRYPTOCOCCUS NEOFORMANS/GATTII BY PCR: NOT DETECTED
ENTEROBACTER CLOACAE COMPLEX BY PCR: NOT DETECTED
ENTEROBACTERALES BY PCR: NOT DETECTED
ENTEROCOCCUS FAECALIS BY PCR: NOT DETECTED
ENTEROCOCCUS FAECIUM BY PCR: NOT DETECTED
ESCHERICHIA COLI BY PCR: NOT DETECTED
GLUCOSE BLD-MCNC: 45 MG/DL (ref 70–99)
GLUCOSE BLD-MCNC: 56 MG/DL (ref 70–99)
GLUCOSE BLD-MCNC: 78 MG/DL (ref 70–99)
GLUCOSE BLD-MCNC: 84 MG/DL (ref 70–99)
GLUCOSE BLD-MCNC: 86 MG/DL (ref 70–99)
GLUCOSE BLD-MCNC: 90 MG/DL (ref 70–99)
HAEMOPHILUS INFLUENZAE BY PCR: NOT DETECTED
KLEBSIELLA AEROGENES BY PCR: NOT DETECTED
KLEBSIELLA OXYTOCA BY PCR: NOT DETECTED
KLEBSIELLA PNEUMONIAE GROUP BY PCR: NOT DETECTED
LISTERIA MONOCYTOGENES BY PCR: NOT DETECTED
MAGNESIUM: 2 MG/DL (ref 1.6–2.4)
METHICILLIN RESISTANCE MECA/C  BY PCR: ABNORMAL
METHICILLIN RESISTANCE MECA/C AND MREJ BY PCR: ABNORMAL
NEISSERIA MENINGITIDIS BY PCR: NOT DETECTED
PERFORMED ON: ABNORMAL
PERFORMED ON: ABNORMAL
PERFORMED ON: NORMAL
PRO-BNP: 2009 PG/ML (ref 0–1800)
PROTEUS SPECIES BY PCR: NOT DETECTED
PSEUDOMONAS AERUGINOSA BY PCR: NOT DETECTED
SALMONELLA SPECIES BY PCR: NOT DETECTED
SERRATIA MARCESCENS BY PCR: NOT DETECTED
STAPHYLOCOCCUS AUREUS BY PCR: NOT DETECTED
STAPHYLOCOCCUS EPIDERMIDIS BY PCR: NOT DETECTED
STAPHYLOCOCCUS LUGDUNENSIS BY PCR: NOT DETECTED
STAPHYLOCOCCUS SPECIES BY PCR: DETECTED
STENOTROPHOMONAS MALTOPHILIA BY PCR: NOT DETECTED
STREPTOCOCCUS AGALACTIAE BY PCR: NOT DETECTED
STREPTOCOCCUS PNEUMONIAE BY PCR: NOT DETECTED
STREPTOCOCCUS PYOGENES  BY PCR: NOT DETECTED
STREPTOCOCCUS SPECIES BY PCR: NOT DETECTED
VANCOMYCIN RESISTANT BY PCR: ABNORMAL

## 2022-05-29 PROCEDURE — 87040 BLOOD CULTURE FOR BACTERIA: CPT

## 2022-05-29 PROCEDURE — 2580000003 HC RX 258: Performed by: HOSPITALIST

## 2022-05-29 PROCEDURE — 2580000003 HC RX 258: Performed by: EMERGENCY MEDICINE

## 2022-05-29 PROCEDURE — 1210000000 HC MED SURG R&B

## 2022-05-29 PROCEDURE — 2580000003 HC RX 258: Performed by: INTERNAL MEDICINE

## 2022-05-29 PROCEDURE — 99223 1ST HOSP IP/OBS HIGH 75: CPT | Performed by: INTERNAL MEDICINE

## 2022-05-29 PROCEDURE — 82947 ASSAY GLUCOSE BLOOD QUANT: CPT

## 2022-05-29 PROCEDURE — 83880 ASSAY OF NATRIURETIC PEPTIDE: CPT

## 2022-05-29 PROCEDURE — 87150 DNA/RNA AMPLIFIED PROBE: CPT

## 2022-05-29 PROCEDURE — 96365 THER/PROPH/DIAG IV INF INIT: CPT

## 2022-05-29 PROCEDURE — 6370000000 HC RX 637 (ALT 250 FOR IP): Performed by: INTERNAL MEDICINE

## 2022-05-29 PROCEDURE — 36415 COLL VENOUS BLD VENIPUNCTURE: CPT

## 2022-05-29 RX ORDER — POTASSIUM CHLORIDE 20 MEQ/1
40 TABLET, EXTENDED RELEASE ORAL ONCE
Status: DISCONTINUED | OUTPATIENT
Start: 2022-05-29 | End: 2022-05-31 | Stop reason: HOSPADM

## 2022-05-29 RX ORDER — POTASSIUM CHLORIDE 20 MEQ/1
40 TABLET, EXTENDED RELEASE ORAL PRN
Status: DISCONTINUED | OUTPATIENT
Start: 2022-05-29 | End: 2022-05-31 | Stop reason: HOSPADM

## 2022-05-29 RX ORDER — SODIUM CHLORIDE 9 MG/ML
INJECTION, SOLUTION INTRAVENOUS PRN
Status: DISCONTINUED | OUTPATIENT
Start: 2022-05-29 | End: 2022-05-31 | Stop reason: HOSPADM

## 2022-05-29 RX ORDER — ONDANSETRON 4 MG/1
4 TABLET, ORALLY DISINTEGRATING ORAL EVERY 8 HOURS PRN
Status: DISCONTINUED | OUTPATIENT
Start: 2022-05-29 | End: 2022-05-31 | Stop reason: HOSPADM

## 2022-05-29 RX ORDER — DEXTROSE MONOHYDRATE 25 G/50ML
25 INJECTION, SOLUTION INTRAVENOUS PRN
Status: DISCONTINUED | OUTPATIENT
Start: 2022-05-29 | End: 2022-05-29

## 2022-05-29 RX ORDER — ONDANSETRON 2 MG/ML
4 INJECTION INTRAMUSCULAR; INTRAVENOUS EVERY 6 HOURS PRN
Status: DISCONTINUED | OUTPATIENT
Start: 2022-05-29 | End: 2022-05-31 | Stop reason: HOSPADM

## 2022-05-29 RX ORDER — SODIUM CHLORIDE 0.9 % (FLUSH) 0.9 %
10 SYRINGE (ML) INJECTION EVERY 12 HOURS SCHEDULED
Status: DISCONTINUED | OUTPATIENT
Start: 2022-05-29 | End: 2022-05-31 | Stop reason: HOSPADM

## 2022-05-29 RX ORDER — MAGNESIUM SULFATE 1 G/100ML
1000 INJECTION INTRAVENOUS PRN
Status: DISCONTINUED | OUTPATIENT
Start: 2022-05-29 | End: 2022-05-31 | Stop reason: HOSPADM

## 2022-05-29 RX ORDER — SODIUM CHLORIDE 0.9 % (FLUSH) 0.9 %
10 SYRINGE (ML) INJECTION PRN
Status: DISCONTINUED | OUTPATIENT
Start: 2022-05-29 | End: 2022-05-31 | Stop reason: HOSPADM

## 2022-05-29 RX ORDER — ACETAMINOPHEN 650 MG/1
650 SUPPOSITORY RECTAL EVERY 6 HOURS PRN
Status: DISCONTINUED | OUTPATIENT
Start: 2022-05-29 | End: 2022-05-31 | Stop reason: HOSPADM

## 2022-05-29 RX ORDER — DEXTROSE AND SODIUM CHLORIDE 5; .45 G/100ML; G/100ML
INJECTION, SOLUTION INTRAVENOUS CONTINUOUS
Status: DISCONTINUED | OUTPATIENT
Start: 2022-05-29 | End: 2022-05-30

## 2022-05-29 RX ORDER — POLYETHYLENE GLYCOL 3350 17 G/17G
17 POWDER, FOR SOLUTION ORAL DAILY PRN
Status: DISCONTINUED | OUTPATIENT
Start: 2022-05-29 | End: 2022-05-31 | Stop reason: HOSPADM

## 2022-05-29 RX ORDER — ACETAMINOPHEN 325 MG/1
650 TABLET ORAL EVERY 6 HOURS PRN
Status: DISCONTINUED | OUTPATIENT
Start: 2022-05-29 | End: 2022-05-31 | Stop reason: HOSPADM

## 2022-05-29 RX ORDER — POTASSIUM CHLORIDE 7.45 MG/ML
10 INJECTION INTRAVENOUS PRN
Status: DISCONTINUED | OUTPATIENT
Start: 2022-05-29 | End: 2022-05-31 | Stop reason: HOSPADM

## 2022-05-29 RX ADMIN — DEXTROSE MONOHYDRATE 250 ML: 100 INJECTION, SOLUTION INTRAVENOUS at 13:00

## 2022-05-29 RX ADMIN — POTASSIUM CHLORIDE 40 MEQ: 1500 TABLET, EXTENDED RELEASE ORAL at 03:15

## 2022-05-29 RX ADMIN — DEXTROSE AND SODIUM CHLORIDE: 5; 450 INJECTION, SOLUTION INTRAVENOUS at 00:33

## 2022-05-29 RX ADMIN — SODIUM CHLORIDE, PRESERVATIVE FREE 10 ML: 5 INJECTION INTRAVENOUS at 09:00

## 2022-05-29 ASSESSMENT — ENCOUNTER SYMPTOMS
EYES NEGATIVE: 1
DIARRHEA: 0
NAUSEA: 0
SHORTNESS OF BREATH: 0
VOMITING: 0
GASTROINTESTINAL NEGATIVE: 1
RESPIRATORY NEGATIVE: 1

## 2022-05-29 ASSESSMENT — PAIN SCALES - GENERAL
PAINLEVEL_OUTOF10: 0
PAINLEVEL_OUTOF10: 0

## 2022-05-29 NOTE — PROGRESS NOTES
..  Loli Chávez arrived to room # 323. Presented with: hypoglycemia, ARF  Mental Status: Patient is oriented, alert, coherent, logical, thought processes intact and able to concentrate and follow conversation. Vitals:    05/29/22 0246   BP: (!) 119/54   Pulse: 60   Resp: 18   Temp: 97 °F (36.1 °C)   SpO2: 97%     Patient safety contract and falls prevention contract reviewed with patient Yes. Oriented Patient to room. Call light within reach. Yes. Needs, issues or concerns expressed at this time: yes, states being wet x 2 hrs but did not notify staff in ER.       Electronically signed by Abdiel Pérez RN on 5/29/2022 at 4:08 AM

## 2022-05-29 NOTE — CONSULTS
Nephrology ([facility]) Consult Note      Patient:  Lillian Funk  YOB: 1940  Date of Service: 5/29/2022  MRN: [id number]   Acct: [de-identified]   Primary Care Physician: Bette Gallagher MD  Advance Directive: Full Code  Admit Date: 5/28/2022       Hospital Day: 0  Referring Provider: Neto Abreu MD    Patient independently seen and examined, Chart, Consults, Notes, Operative notes, Labs, Cardiology, and Radiology studies reviewed as available. Subjective:  Lillian Funk is a [de-identified] y.o. male for whom we were consulted for evaluation and treatment of chronic kidney disease stage IV. Patient has followed with our office for some time after an episode of acute kidney injury which required short-term dialysis. Baseline GFR of 22 in our system from 12/5/2021. Recent admission for congestive heart failure exacerbation and discharged on 5/26. Returned on 5/29 with complaints of persistent hypoglycemia. He ultimately required a D10 infusion which he was on for initial examination. He noted symptomatic hypoglycemic episodes. He denied other complaints including chest pain, nausea or vomiting. His respiratory status was stable from discharge.         Allergies:  Eliquis [apixaban] and Promethazine hcl    Medicines:  Current Facility-Administered Medications   Medication Dose Route Frequency Provider Last Rate Last Admin    dextrose 5 % and 0.45 % sodium chloride infusion   IntraVENous Continuous Patience Tawanda, DO 75 mL/hr at 05/29/22 0355 Rate Change at 05/29/22 0355    sodium chloride flush 0.9 % injection 10 mL  10 mL IntraVENous 2 times per day Patience Tawanda, DO   10 mL at 05/29/22 0900    sodium chloride flush 0.9 % injection 10 mL  10 mL IntraVENous PRN Patience Tawanda, DO        0.9 % sodium chloride infusion   IntraVENous PRN Patience Tawanda, DO        potassium chloride (KLOR-CON M) extended release tablet 40 mEq  40 mEq Oral PRN Kyle Griffin DO   40 mEq at 05/29/22 0315    Or    potassium bicarb-citric acid (EFFER-K) effervescent tablet 40 mEq  40 mEq Oral PRN Patience Tawanda, DO        Or    potassium chloride 10 mEq/100 mL IVPB (Peripheral Line)  10 mEq IntraVENous PRN Patience Tawanda, DO        magnesium sulfate 1000 mg in dextrose 5% 100 mL IVPB  1,000 mg IntraVENous PRN Patience Tawanda, DO        ondansetron (ZOFRAN-ODT) disintegrating tablet 4 mg  4 mg Oral Q8H PRN Patience Tawanda, DO        Or    ondansetron (ZOFRAN) injection 4 mg  4 mg IntraVENous Q6H PRN Patience Tawanda, DO        polyethylene glycol (GLYCOLAX) packet 17 g  17 g Oral Daily PRN Patience Tawanda, DO        acetaminophen (TYLENOL) tablet 650 mg  650 mg Oral Q6H PRN Patience Tawanda, DO        Or    acetaminophen (TYLENOL) suppository 650 mg  650 mg Rectal Q6H PRN Patience Tawanda, DO        potassium chloride (KLOR-CON M) extended release tablet 40 mEq  40 mEq Oral Once Patience Tawanda, DO        dextrose bolus 10% 125 mL  125 mL IntraVENous PRN Osmar Cunningham MD        Or    dextrose bolus 10% 250 mL  250 mL IntraVENous PRN Osmar Cunningham MD   Stopped at 05/29/22 1328       Past Medical History:  Past Medical History:   Diagnosis Date    Acute liver failure without hepatic coma 10/23/2018    Back pain     \"with tired legs as a result\"    Bladder cancer ([address] 12/19/2018    Blood circulation, collateral     Carotid arterial disease ([address]     recent surgery    CKD (chronic kidney disease), 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/2018    Constipation     COPD with acute lower respiratory infection ([address] 02/24/2021    Epigastric discomfort     GERD (gastroesophageal reflux disease)     Hyperlipidemia     Hypertension     Hypertension     Pacemaker     Palliative care patient 10/23/2018    Pneumonia due to infectious organism 11/06/2018    Primary osteoarthritis of left knee 10/14/2018    PVD (peripheral vascular disease) (HCC)     Tremor     Tremor on Right side x 1-2 weeks per stepdaughter    Type 2 diabetes mellitus with complication, without long-term current use of insulin ([facility] 01/21/2021       Past Surgical History:  Past Surgical History:   Procedure Laterality Date    BACK SURGERY      CARDIAC CATHETERIZATION  03/23/2021    100% RCA    COLONOSCOPY  2007?  CYSTOSCOPY Bilateral 12/19/2018    CYSTOSCOPY, BIOSPY FULGURATION OF BLADDER TUMOR POSSIBLE TURBT, RETROGRADE PYELOGRAM performed by Ingrid Fitch MD at [address] COLONOSCOPY FLX DX W/COLLJ SPEC WHEN PFRMD N/A 09/11/2017    Dr Carlos Gastelum internal hemorrhoids, diverticular disease-HP-No recall (age)   [employer] [place] REVISE MEDIAN N/CARPAL TUNNEL SURG Left 07/18/2018    OPEN CARPAL TUNNEL RELEASE performed by Alejandrina Montgomery MD at [address] Left 08/28/2018    LEFT CAROTID ENDARTERECTOMY WITH VEIN PATCH ANGIOPLASTY AND COMPLETION ANGIOGRAM performed by Remington Harris MD at [place] [address] Left 10/15/2018    LEFT COMPLEX TOTAL KNEE ARTHROPLASTY performed by Alejandrina Montgomery MD at [facility] ENDOSCOPY N/A 11/18/2021    Dr Chelsea Patino, Sub prep lg amount of solid and semisolid food/Medication in stomach body/antrum/pylorus, stomach lumen appeared to distended w air insufflation and collapse upon suction,   inadequate exam sugg of gastroparesis, repeat in 10-14 days    UPPER GASTROINTESTINAL ENDOSCOPY N/A 01/06/2022    Dr Chelsea Patino, distal stomach body 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 Aortogram.Diagnostic right lower extremity arteriogram.Radiologic supervision and interpretation.  VASCULAR SURGERY  2015    Luzma Quintana M.D Atherectomy,angioplasty,and stenting of left superficial femoral artery.  VASCULAR SURGERY  2014    Luzma Quintana M.D. Ultrasound-guided access of right common femoral artery. Aortogram.Left lower extremity arteriogram.Atherectomy and angioplasty of left superficial femoral artery. Radiologic supervision and interpretation.  VASCULAR SURGERY  2013    Luzma BESS Aortogram.Multistation arteriogram right lower extremity. Laser atherectomy and angioplasty of right superficial femoral artery. Selective catheterization of right tibioperoneal trunk. Angioplasty of peroneal artery and tibioperoneal trunk.  VASCULAR SURGERY  10/30/2018    [facility]. Ultrasound guided cannulation of right internal vein. Placement of right internal jugular vein tunneled dialysis catheter bard equistream xk 23cm tip to cuff    VASCULAR SURGERY  2018    [facility]. Removal of tunneled dilaysis catheter right internal jugular vein.        Family History  Family History   Problem Relation Age of Onset    Colon Cancer Father     Diabetes Brother     Colon Polyps Neg Hx     Liver Cancer Neg Hx     Liver Disease Neg Hx     Esophageal Cancer Neg Hx     Rectal Cancer Neg Hx     Stomach Cancer Neg Hx        Social History  Social History     Socioeconomic History    Marital status:      Spouse name: Not on file    Number of children: Not on file    Years of education: Not on file    Highest education level: Not on file   Occupational History    Not on file   Tobacco Use    Smoking status: Former Smoker     Packs/day: 2.00     Years: 48.00     Pack years: 96.00     Types: Cigarettes     Quit date: 6/3/2003     Years since quittin.0    Smokeless tobacco: Never Used   Vaping Use    Vaping Use: Never used   Substance and Sexual Activity    Alcohol use: Yes     Alcohol/week: 12.0 standard drinks     Types: 12 Glasses of wine per week     Comment: 1 glasses of wine every night    Drug use: No    Sexual activity: Yes     Partners: Female   Other Topics Concern    Not on file   Social History Narrative    Not on file     Social Determinants of Health     Financial Resource Strain:     Difficulty of Paying Living Expenses: Not on file   Food Insecurity:     Worried About Running Out of Food in the Last Year: Not on file    Ankit of Food in the Last Year: Not on file   Transportation Needs:     Lack of Transportation (Medical): Not on file    Lack of Transportation (Non-Medical):  Not on file   Physical Activity:     Days of Exercise per Week: Not on file    Minutes of Exercise per Session: Not on file   Stress:     Feeling of Stress : Not on file   Social Connections:     Frequency of Communication with Friends and Family: Not on file    Frequency of Social Gatherings with Friends and Family: Not on file    Attends [employer] Services: Not on file    Active Member of [address] [place] [address] or Organizations: Not on file    Attends Club or Organization Meetings: Not on file    Marital Status: Not on file   Intimate Partner Violence:     Fear of Current or Ex-Partner: Not on file    Emotionally Abused: Not on file    Physically Abused: Not on file    Sexually Abused: Not on file   Housing Stability:     Unable to Pay for Housing in the Last Year: Not on file    Number of [place] in the Last Year: Not on file    Unstable Housing in the Last Year: Not on file         Review of Systems:  History obtained from chart review and the patient  General ROS: No fever or chills  Respiratory ROS: No cough, shortness of breath, wheezing  Cardiovascular ROS: No chest pain or palpitations  Gastrointestinal ROS: No abdominal pain or melena  Genito-Urinary ROS: No dysuria or hematuria  Musculoskeletal ROS: No joint pain or swelling   14 point ROS reviewed with the patient and negative except as noted above and in the HPI unless unable to obtain. Objective:  Patient Vitals for the past 24 hrs:   BP Temp Temp src Pulse Resp SpO2 Height Weight   05/29/22 1230 (!) 123/54 97.4 °F (36.3 °C) -- 60 20 97 % -- --   05/29/22 0603 128/62 98.1 °F (36.7 °C) -- 59 18 97 % -- --   05/29/22 0246 (!) 119/54 97 °F (36.1 °C) Temporal 60 18 97 % -- --   05/29/22 0200 122/65 -- -- 64 19 92 % 6' (1.829 m) 216 lb 6.4 oz (98.2 kg)   05/29/22 0130 (!) 121/57 -- -- 64 19 (!) 89 % -- --   05/29/22 0030 (!) 116/55 -- -- 60 16 95 % -- --   05/29/22 0000 (!) 114/59 -- -- 60 20 92 % -- --   05/28/22 2345 (!) 121/56 -- -- 60 18 92 % -- --   05/28/22 2315 -- -- -- 63 10 94 % -- --   05/28/22 2115 -- -- -- 60 22 100 % -- --   05/28/22 2100 -- -- -- 68 25 97 % -- --   05/28/22 2045 -- -- -- 59 17 94 % -- --   05/28/22 2030 (!) 137/105 -- -- -- -- -- -- --   05/28/22 2028 (!) 137/105 98.3 °F (36.8 °C) Infrared -- 18 -- -- --       Intake/Output Summary (Last 24 hours) at 5/29/2022 1442  Last data filed at 5/29/2022 0945  Gross per 24 hour   Intake 742.82 ml   Output 425 ml   Net 317.82 ml     General: awake/alert   Chest:  clear to auscultation bilaterally  CVS: regular rate and rhythm  Abdominal: soft, nontender, normal bowel sounds  Extremities: no cyanosis, ble edema  Skin: warm and dry without rash      Labs:  BMP:   Recent Labs     05/28/22 2109      K 3.3*      CO2 28   BUN 64*   CREATININE 3.4*   CALCIUM 9.2     CBC:   Recent Labs     05/28/22 2109   WBC 11.5*   HGB 12.0*   HCT 38.2*   MCV 92.0        LIVER PROFILE:   Recent Labs     05/28/22 2109   AST 52*   ALT 48*   BILITOT 0.4   ALKPHOS 134*     PT/INR: No results for input(s): PROTIME, INR in the last 72 hours. APTT: No results for input(s): APTT in the last 72 hours. BNP:  No results for input(s): BNP in the last 72 hours. Ionized Calcium:No results for input(s): IONCA in the last 72 hours.   Magnesium:  Recent Labs 05/28/22 2109   MG 2.0     Phosphorus:No results for input(s): PHOS in the last 72 hours. HgbA1C: No results for input(s): LABA1C in the last 72 hours. Hepatic:   Recent Labs     05/28/22 2109   ALKPHOS 134*   ALT 48*   AST 52*   PROT 6.9   BILITOT 0.4   LABALBU 3.9     Lactic Acid: No results for input(s): LACTA in the last 72 hours. Troponin: No results for input(s): CKTOTAL, CKMB, TROPONINT in the last 72 hours. ABGs: No results for input(s): PH, PCO2, PO2, HCO3, O2SAT in the last 72 hours. CRP:  No results for input(s): CRP in the last 72 hours. Sed Rate:  No results for input(s): SEDRATE in the last 72 hours. Cultures:   No results for input(s): CULTURE in the last 72 hours. No results for input(s): BC, [employer] in the last 72 hours. No results for input(s): CXSURG in the last 72 hours. Radiology reports as per the Radiologist  Radiology: No results found. 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 Feel free to contact me if I can be of any further assistance.       Rodolfo Scott MD  05/29/22  2:42 PM

## 2022-05-29 NOTE — PROGRESS NOTES
Patient noon accu check was 48. MD notified, received orders to bolus with 125ml of D10. Recheck patients BG 84. MD aware of new reading. Will continue to monitor.

## 2022-05-29 NOTE — H&P
[place], [place], [address]    DEPARTMENT OF HOSPITALIST MEDICINE      HISTORY & PHYSICAL:          REASON FOR ADMISSION:  Chief Complaint   Patient presents with    Hypoglycemia     family called EMS for combative and Altered mental status related to hypoglycemia episode was blood sugar was 31 when EMS arrive         HISTORY OF PRESENT ILLNESS:  Harrison Boo is an [de-identified] y.o. male with medical history significant for CHF exacerbation. Patient was admitted and treated and then discharged 2 days ago after being treated for CHF exacerbation and was placed on Lasix or diuretics at home. Patient is also taking long acting oral hypoglycemic agent such as sulfonylurea. Patient is on Glucotrol. Home patient was noted today to be very combative and with altered mentation. EMS was called for care of the patient. Upon arriving to patient's home check was done and it read 31 with the slow Accu-Chek patient was given an amp of D50 and brought to the emergency room. Recheck of Accu-Chek at the emergency room was noted to be 36 had 2 series of blood sugar and then had to be placed on D5 one half normal saline      For the evaluation in the emergency room included chemistry showed a BUN of 64 and a creatinine of 3.4 patient baseline creatinine is 2.5. Is on Lasix that he was discharge with  At this time is being treated for high pulm glycemia and acute on chronic renal failure from over diuresis. The wife is at the bedside and patient seem to be doing much better  upon my evaluation of the patient emergency room.     PAST MEDICAL HISTORY:  Past Medical History:   Diagnosis Date    Acute liver failure without hepatic coma 10/23/2018    Back pain     \"with tired legs as a result\"    Bladder cancer ([address] 12/19/2018    Blood circulation, collateral     Carotid arterial disease ([address]     recent surgery    CKD (chronic kidney disease), 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/2018    Constipation     COPD with acute lower respiratory infection ([facility] 02/24/2021    Epigastric discomfort     GERD (gastroesophageal reflux disease)     Hyperlipidemia     Hypertension     Hypertension     Pacemaker     Palliative care patient 10/23/2018    Pneumonia due to infectious organism 11/06/2018    Primary osteoarthritis of left knee 10/14/2018    PVD (peripheral vascular disease) (HCC)     Tremor     Tremor on Right side x 1-2 weeks per stepdaughter    Type 2 diabetes mellitus with complication, without long-term current use of insulin ([facility] 01/21/2021         PAST SURGICAL HISTORY:  Past Surgical History:   Procedure Laterality Date    BACK SURGERY      CARDIAC CATHETERIZATION  03/23/2021    100% RCA    COLONOSCOPY  2007?     CYSTOSCOPY Bilateral 12/19/2018    CYSTOSCOPY, BIOSPY FULGURATION OF BLADDER TUMOR POSSIBLE TURBT, RETROGRADE PYELOGRAM performed by Ena Hamilton MD at \[facility]\"" 44 COLONOSCOPY FLX DX W/COLLJ SPEC WHEN PFRMD N/A 09/11/2017    Dr Auguste Jovi internal hemorrhoids, diverticular disease-HP-No recall (age)   [place] REVISE MEDIAN N/CARPAL TUNNEL SURG Left 07/18/2018    OPEN CARPAL TUNNEL RELEASE performed by Alessia Eagle MD at [address] Left 08/28/2018    LEFT CAROTID ENDARTERECTOMY WITH VEIN PATCH ANGIOPLASTY AND COMPLETION ANGIOGRAM performed by Ada Blum MD at [place] [address] Left 10/15/2018    LEFT COMPLEX TOTAL KNEE ARTHROPLASTY performed by Alessia Eagle MD at [address] [facility] ENDOSCOPY N/A 11/18/2021    Dr Aditya Stevens, Sub prep lg amount of solid and semisolid food/Medication in stomach body/antrum/pylorus, stomach lumen appeared to distended w air insufflation and collapse upon suction,   inadequate exam sugg of gastroparesis, repeat in 10-14 days    UPPER GASTROINTESTINAL ENDOSCOPY N/A 01/06/2022    Dr Aditya Stevens, distal stomach body likely GIST nodule-Submucosal fundic body gastric mucosa, Benign, (-)Hpylori, (-)Sprue    UPPER GASTROINTESTINAL ENDOSCOPY N/A 2022    Dr Ramiro Nieto, CHYNA submucosal gastric nodule, otherwise normal, 1 year recall    UPPER GASTROINTESTINAL ENDOSCOPY N/A 2022    Dr Ramiro Nieto, W EUS,  nodular area at end of rugal gold in distal stomach body    VASCULAR SURGERY  2015    Pina BESS Ultrasound guided access of left common femoral artery. Aortogram.Diagnostic right lower extremity arteriogram.Radiologic supervision and interpretation.  VASCULAR SURGERY  2015    Pina Cedeno M.D Atherectomy,angioplasty,and stenting of left superficial femoral artery.  VASCULAR SURGERY  2014    Pina Cedeno M.D. Ultrasound-guided access of right common femoral artery. Aortogram.Left lower extremity arteriogram.Atherectomy and angioplasty of left superficial femoral artery. Radiologic supervision and interpretation.  VASCULAR SURGERY  2013    Pina LIU. Aortogram.Multistation arteriogram right lower extremity. Laser atherectomy and angioplasty of right superficial femoral artery. Selective catheterization of right tibioperoneal trunk. Angioplasty of peroneal artery and tibioperoneal trunk.  VASCULAR SURGERY  10/30/2018    [facility]. Ultrasound guided cannulation of right internal vein. Placement of right internal jugular vein tunneled dialysis catheter bard equistream xk 23cm tip to cuff    VASCULAR SURGERY  2018    [facility]. Removal of tunneled dilaysis catheter right internal jugular vein.         SOCIAL HISTORY:  Social History     Socioeconomic History    Marital status:      Spouse name: None    Number of children: None    Years of education: None    Highest education level: None   Occupational History    None   Tobacco Use    Smoking status: Former Smoker     Packs/day: 2.00     Years: 48.00     Pack years: 96.00     Types: Cigarettes     Quit date: 6/3/2003     Years since quittin.0    Smokeless tobacco: Never Used   Vaping Use    Vaping Use: Never used   Substance and Sexual Activity    Alcohol use: Yes     Alcohol/week: 12.0 standard drinks     Types: 12 Glasses of wine per week     Comment: 1 glasses of wine every night    Drug use: No    Sexual activity: Yes     Partners: Female   Other Topics Concern    None   Social History Narrative    None     Social Determinants of Health     Financial Resource Strain:     Difficulty of Paying Living Expenses: Not on file   Food Insecurity:     Worried About Running Out of Food in the Last Year: Not on file    Ankit of Food in the Last Year: Not on file   Transportation Needs:     Lack of Transportation (Medical): Not on file    Lack of Transportation (Non-Medical):  Not on file   Physical Activity:     Days of Exercise per Week: Not on file    Minutes of Exercise per Session: Not on file   Stress:     Feeling of Stress : Not on file   Social Connections:     Frequency of Communication with Friends and Family: Not on file    Frequency of Social Gatherings with Friends and Family: Not on file    Attends Shinto Services: Not on file    Active Member of [address] [place] [address] or Organizations: Not on file    Attends Club or Organization Meetings: Not on file    Marital Status: Not on file   Intimate Partner Violence:     Fear of Current or Ex-Partner: Not on file    Emotionally Abused: Not on file    Physically Abused: Not on file    Sexually Abused: Not on file   Housing Stability:     Unable to Pay for Housing in the Last Year: Not on file    Number of [place] in the Last Year: Not on file    Unstable Housing in the Last Year: Not on file        FAMILY HISTORY:  Family History   Problem Relation Age of Onset    Colon Cancer Father     Diabetes Brother     Colon Polyps Neg Hx     Liver Cancer Neg Hx     Liver Disease Neg Hx     Esophageal Cancer Neg Hx     Rectal Cancer Neg Hx     Stomach Cancer Neg Hx ALLERGIES:  Allergies   Allergen Reactions    Eliquis [Apixaban] Other (See Comments)     \"almost bled to death\"    Promethazine Hcl Other (See Comments)     He became encephalopathic for several hours and was not responsive. PRIOR TO ADMISSION MEDS:  Not in a hospital admission. REVIEW OF SYSTEMS:  Constitutional:  No fevers, chills, nausea, vomiting, + tiredness & fatigue   Head:  No head injury, facial trauma   Eyes:  No acute visual changes, exudate, trauma   Ears:  No acute hearing loss, earaches   Nose: No nasal discharge, epistaxis   Neck: No new hoarseness, voice change, or new masses   Lungs:   No hemoptysis, pleurisy   Heart:  No chest pressure with exertion, palpitations,    Abdomen:   No new masses, no bright red blood per rectum   Extremities: No acute pain while ambulating, no new lesions   Skin: No new changes in skin color, no rashes or lesions   Neurologic: No new motor or sensory changes     14 point review of systems addressed with patient which is essentially negative except as specifically addressed above:    PHYSICAL EXAM:  BP (!) 116/55   Pulse 60   Temp 98.3 °F (36.8 °C) (Infrared)   Resp 16   SpO2 95%   No intake/output data recorded.       PHYSICAL EXAMINATION:    Vital Signs: Please see the chart   BENITO:  Awake, alert, oriented x 3, patient appears tired and fatigued   Head/Eyes:  Normocephalic, atraumatic, EOMI and PERRLA bilaterally   ENT: Moist mucous membranes, nasal passages clear   Neck: Supple, full range of motion, no carotid bruit, trachea midline   Respiratory:   Bilateral fair air entry in both lung fields, mild B/L crackles, symmetric expansion of chest   Cardiovascular:  Regular rate and rhythm, S1+S2+0, no murmurs/rubs   Urology: No bilateral CVA tenderness, no suprapubic tenderness   Abdomen:   Soft, non-tender, bowel sounds +ve, no organomegaly   Muscle/Joints: Moves all, full range of motion, no muscle spasms   Extremities: No clubbing, no cyanosis, no calf tenderness, no edema   Pulses: 2+ bilaterally, symmetrical   Skin: Warm, dry, no pallor/cyanosis/jaundice, no rashes/lesions   Neurologic: Awake, alert, oriented x 3, cranial nerves II-XII intact, no focal neurological deficits, sensory system intact   Psychiatric: Normal mood, non-suicidal         LABORATORY DATA:    CBC:  Recent Labs     05/28/22 2109   WBC 11.5*   HGB 12.0*   HCT 38.2*        BMP:  Recent Labs     05/26/22  1014 05/28/22 2109    142   K 3.5 3.3*    100   CO2 26 28   BUN 54* 64*   CREATININE 2.9* 3.4*   CALCIUM 9.7 9.2     Recent Labs     05/28/22 2109   AST 52*   ALT 48*   BILITOT 0.4   ALKPHOS 134*     Coag Panel: No results for input(s): INR, PROTIME, APTT in the last 72 hours. Cardiac Enzymes: No results for input(s): Verlena Allen in the last 72 hours. ABGs:  Lab Results   Component Value Date    PHART 7.310 05/25/2022    PO2ART 57.0 05/25/2022    GOM9FEO 49.0 05/25/2022     Urinalysis:  Lab Results   Component Value Date    NITRU Negative 05/20/2022    WBCUA 1 11/23/2021    BACTERIA 2+ 05/20/2022    BACTERIA NEGATIVE 11/23/2021    RBCUA 0 05/20/2022    RBCUA 1 11/23/2021    BLOODU Positive 05/20/2022    SPECGRAV 1.025 05/20/2022    GLUCOSEU NEG 05/20/2022     A1C: No results for input(s): LABA1C in the last 72 hours. ABG:No results for input(s): PHART, IAN5OEQ, PO2ART, QZI0TIZ, BEART, HGBAE, J0FQFHKO, CARBOXHGBART in the last 72 hours. EKG:   Please see chart      IMAGING:  No results found.       Assessment and Plan:  Mental state alteration due to hypoglycemia  -Admit  to medical floor with telemetry  -Optimize glycemic level  -Avoid  Sulfonylurea  -Hydration of D5 and half normal saline at 75 cc/h blood glucose and also offer volume gently  -Cautious of volume overload      CHF exacerbation  -formally  optimized at discharge 2 days prior, now presented overly diuresed  -Small volume at 75 cc of half-normal saline  -follow Chemistry    Hypoglycemia  -Sulfonylurea in an [de-identified]year-old  -Continue to monitor and optimize  -D5 Half-normal saline        HYPOKalemia  -Potassium of 3.3 be replaced with 40 mEq of oral potassium        Weakness  PT and OT with optimization      Attestation:  Inpatient status is used for patients with an expected LOS extending past two midnights due to medical therapy and/or critical care needs  . .. all other patients are placed under OBServation status. Annmarie Barnett DO  1:42 AM 5/29/2022      DISCLAIMER: This note was created with electronic voice recognition which does have occasional errors. If you have any questions regarding the content within the note please do not hesitate to contact me. .. Thanks.

## 2022-05-29 NOTE — ED NOTES
This nurse went into pt room for checkup, noticed pt had a change in appearance. Pt looked diaphoretic with new lethargy. Pt stated \"im starting to feel bad again\" when asked if his symptoms were improving or worsening. POCT glucose test performed with results of 36mg/dl.  Render Rakan notified      Nabil Redman RN  05/28/22 1162

## 2022-05-29 NOTE — ED PROVIDER NOTES
[address] [facility] DE[facility]  eMERGENCY dEPARTMENT eNCOUnter      Pt Name: Rochelle Emery  MRN: [id number]  [place] 1940  Date of evaluation: 5/28/2022  Provider: Alessandra Dover MD    CHIEF COMPLAINT       Chief Complaint   Patient presents with    Hypoglycemia     family called EMS for combative and Altered mental status related to hypoglycemia episode was blood sugar was 31 when EMS arrive          HISTORY OF PRESENT ILLNESS   (Location/Symptom, Timing/Onset,Context/Setting, Quality, Duration, Modifying Factors, Severity)  Note limiting factors. Rochelle Emery is a [de-identified] y.o. male who presents to the emergency department for evaluation regarding altered mental status. Patient arrived here to the ED via EMS. Apparently when they arrived to the scene patient was confused and combative. They did an Accu-Chek and it was noted to be 31. He had received glucose prior to arrival here in the ED. At this time he is more alert and answering questions. States that he did not take any additional additional medications for management of his diabetes. He is just discharged from the hospital couple of days ago after undergoing treatment for congestive heart failure exacerbation. States that since going home he has not had any fevers or chills. HPI    NursingNotes were reviewed. REVIEW OF SYSTEMS    (2-9 systems for level 4, 10 or more for level 5)     Review of Systems   Constitutional: Negative for chills and fever. HENT: Negative for congestion. Respiratory: Negative for shortness of breath. Cardiovascular: Negative for chest pain and palpitations. Gastrointestinal: Negative for abdominal distention, abdominal pain, diarrhea and vomiting. Neurological: Negative for dizziness. Psychiatric/Behavioral: Positive for agitation and confusion. All other systems reviewed and are negative.            PAST MEDICALHISTORY     Past Medical History:   Diagnosis Date    Acute liver failure without hepatic coma 10/23/2018    Back pain     \"with tired legs as a result\"    Bladder cancer ([facility] 12/19/2018    Blood circulation, collateral     Carotid arterial disease (HCC)     recent surgery    CKD (chronic kidney disease), 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/2018    Constipation     COPD with acute lower respiratory infection ([facility] 02/24/2021    Epigastric discomfort     GERD (gastroesophageal reflux disease)     Hyperlipidemia     Hypertension     Hypertension     Pacemaker     Palliative care patient 10/23/2018    Pneumonia due to infectious organism 11/06/2018    Primary osteoarthritis of left knee 10/14/2018    PVD [facility]peripheral vascular disease) ([facility])     Tremor     Tremor on Right side x 1-2 weeks per stepdaughter    Type 2 diabetes mellitus with complication, without long-term current use of insulin ([facility] 01/21/2021         SURGICAL HISTORY       Past Surgical History:   Procedure Laterality Date    BACK SURGERY      CARDIAC CATHETERIZATION  03/23/2021    100% RCA    COLONOSCOPY  2007?     CYSTOSCOPY Bilateral 12/19/2018    CYSTOSCOPY, BIOSPY FULGURATION OF BLADDER TUMOR POSSIBLE TURBT, RETROGRADE PYELOGRAM performed by Megan Salcedo MD at [facility] COLONOSCOPY FLX DX W/COLLJ SPEC WHEN PFRMD N/A 09/11/2017    Dr David Strickland internal hemorrhoids, diverticular disease-HP-No recall (age)   [facility] REVISE MEDIAN N/CARPAL TUNNEL SURG Left 07/18/2018    OPEN CARPAL TUNNEL RELEASE performed by Mahin Watts MD at [facility] Left 08/28/2018    LEFT CAROTID ENDARTERECTOMY WITH VEIN PATCH ANGIOPLASTY AND COMPLETION ANGIOGRAM performed by Desi Hancock MD at [place] [address] Left 10/15/2018    LEFT COMPLEX TOTAL KNEE ARTHROPLASTY performed by Mahin Watts MD at [facility] ENDOSCOPY N/A 11/18/2021    Dr Cindia Schlatter, Sub prep lg amount of solid and semisolid food/Medication in stomach body/antrum/pylorus, stomach lumen appeared to distended w air insufflation and collapse upon suction,   inadequate exam sugg of gastroparesis, repeat in 10-14 days    UPPER GASTROINTESTINAL ENDOSCOPY N/A 01/06/2022    Dr Jerrell Gastelum, distal stomach body likely GIST nodule-Submucosal fundic body gastric mucosa, Benign, (-)Hpylori, (-)Sprue    UPPER GASTROINTESTINAL ENDOSCOPY N/A 03/07/2022    Dr Jerrell Gastelum, EUS submucosal gastric nodule, otherwise normal, 1 year recall    UPPER GASTROINTESTINAL ENDOSCOPY N/A 03/07/2022    Dr Jerrell Gastelum, W EUS,  nodular area at end of rugal gold in distal stomach body    VASCULAR SURGERY  04/21/2015    Leda BESS Ultrasound guided access of left common femoral artery. Aortogram.Diagnostic right lower extremity arteriogram.Radiologic supervision and interpretation.  VASCULAR SURGERY  01/13/2015    Leda Lozada M.D Atherectomy,angioplasty,and stenting of left superficial femoral artery.  VASCULAR SURGERY  03/11/2014    Leda Lozada M.D. Ultrasound-guided access of right common femoral artery. Aortogram.Left lower extremity arteriogram.Atherectomy and angioplasty of left superficial femoral artery. Radiologic supervision and interpretation.  VASCULAR SURGERY  01/18/2013    Leda BESS Aortogram.Multistation arteriogram right lower extremity. Laser atherectomy and angioplasty of right superficial femoral artery. Selective catheterization of right tibioperoneal trunk. Angioplasty of peroneal artery and tibioperoneal trunk.  VASCULAR SURGERY  10/30/2018    [facility]. Ultrasound guided cannulation of right internal vein. Placement of right internal jugular vein tunneled dialysis catheter bard equchristopher xk 23cm tip to cuff    VASCULAR SURGERY  12/17/2018    [facility]. Removal of tunneled dilaysis catheter right internal jugular vein.          CURRENT MEDICATIONS     Previous Medications    AMIODARONE (CORDARONE) 200 MG TABLET    TAKE 1 TABLET BY MOUTH DAILY    ASPIRIN 81 MG EC TABLET    Take 1 tablet by mouth daily    ATORVASTATIN (LIPITOR) 20 MG TABLET    Take 1 tablet by mouth daily    BUMETANIDE (BUMEX) 1 MG TABLET    Take 2 tablets by mouth daily    FERROUS SULFATE (IRON 325) 325 (65 FE) MG TABLET    Take 1 tablet by mouth 2 times daily (with meals)    FOLIC ACID (FOLVITE) 1 MG TABLET    Take 1 tablet by mouth daily    GLIPIZIDE (GLUCOTROL XL) 10 MG EXTENDED RELEASE TABLET    2.5 mg daily     METOLAZONE (ZAROXOLYN) 2.5 MG TABLET    Take 1 tablet by mouth daily    METOPROLOL SUCCINATE (TOPROL XL) 100 MG EXTENDED RELEASE TABLET    Take 1 tablet by mouth daily    MIRABEGRON (MYRBETRIQ) 25 MG TB24    Take 1 tablet by mouth daily    NIFEDIPINE (ADALAT CC) 60 MG EXTENDED RELEASE TABLET    Take 1 tablet by mouth daily    OXYGEN    Inhale 2 L into the lungs continuous    PANTOPRAZOLE (PROTONIX) 20 MG TABLET    Take 40 mg by mouth daily     SENNA PLUS 8.6-50 MG PER TABLET        TAMSULOSIN (FLOMAX) 0.4 MG CAPSULE    Take 2 capsules by mouth daily    TRULANCE 3 MG TABS    TAKE 1 TABLET BY MOUTH DAILY    UNABLE TO FIND    Take 20 mg by mouth daily trosopium    VITAMIN D (ERGOCALCIFEROL) 1.25 MG (70456 UT) CAPS CAPSULE    Take 1 capsule by mouth once a week       ALLERGIES     Eliquis [apixaban] and Promethazine hcl    FAMILY HISTORY       Family History   Problem Relation Age of Onset    Colon Cancer Father     Diabetes Brother     Colon Polyps Neg Hx     Liver Cancer Neg Hx     Liver Disease Neg Hx     Esophageal Cancer Neg Hx     Rectal Cancer Neg Hx     Stomach Cancer Neg Hx           SOCIAL HISTORY       Social History     Socioeconomic History    Marital status:      Spouse name: None    Number of children: None    Years of education: None    Highest education level: None   Occupational History    None   Tobacco Use    Smoking status: Former Smoker     Packs/day: 2.00     Years: 48.00     Pack years: 96.00     Types: Cigarettes     Quit date: 6/3/2003     Years since quittin.0    Smokeless tobacco: Never Used   Vaping Use    Vaping Use: Never used   Substance and Sexual Activity    Alcohol use: Yes     Alcohol/week: 12.0 standard drinks     Types: 12 Glasses of wine per week     Comment: 1 glasses of wine every night    Drug use: No    Sexual activity: Yes     Partners: Female   Other Topics Concern    None   Social History Narrative    None     Social Determinants of Health     Financial Resource Strain:     Difficulty of Paying Living Expenses: Not on file   Food Insecurity:     Worried About Running Out of Food in the Last Year: Not on file    Ankit of Food in the Last Year: Not on file   Transportation Needs:     Lack of Transportation (Medical): Not on file    Lack of Transportation (Non-Medical):  Not on file   Physical Activity:     Days of Exercise per Week: Not on file    Minutes of Exercise per Session: Not on file   Stress:     Feeling of Stress : Not on file   Social Connections:     Frequency of Communication with Friends and Family: Not on file    Frequency of Social Gatherings with Friends and Family: Not on file    Attends Jewish Services: Not on file    Active Member of [address] [place] [address] or Organizations: Not on file    Attends Club or Organization Meetings: Not on file    Marital Status: Not on file   Intimate Partner Violence:     Fear of Current or Ex-Partner: Not on file    Emotionally Abused: Not on file    Physically Abused: Not on file    Sexually Abused: Not on file   Housing Stability:     Unable to Pay for Housing in the Last Year: Not on file    Number of [place] in the Last Year: Not on file    Unstable Housing in the Last Year: Not on file       SCREENINGS    [place] Coma Scale  Eye Opening: Spontaneous  Best Verbal Response: Oriented  Best Motor Response: Obeys commands  [place] Coma Scale Score: 15        PHYSICAL EXAM    (up to 7 for level 4, 8 or more for level 5)     ED Triage Vitals   BP Temp Temp Source Heart Rate Resp SpO2 Height Weight   05/28/22 2028 05/28/22 2028 05/28/22 2028 05/28/22 2045 05/28/22 2028 05/28/22 2045 -- --   (!) 137/105 98.3 °F (36.8 °C) Infrared 59 18 94 %         Physical Exam  Vitals and nursing note reviewed. HENT:      Head: Atraumatic. Mouth/Throat:      Mouth: Mucous membranes are moist. Mucous membranes are not dry. Eyes:      General: No scleral icterus. Pupils: Pupils are equal, round, and reactive to light. Neck:      Trachea: No tracheal deviation. Cardiovascular:      Rate and Rhythm: Normal rate and regular rhythm. Pulses: Normal pulses. Heart sounds: Normal heart sounds. No murmur heard. Pulmonary:      Effort: Pulmonary effort is normal. No respiratory distress. Breath sounds: Normal breath sounds. No stridor. Abdominal:      General: There is no distension. Palpations: Abdomen is soft. Tenderness: There is no abdominal tenderness. There is no guarding. Musculoskeletal:      Right lower leg: No edema. Left lower leg: No edema. Skin:     Capillary Refill: Capillary refill takes less than 2 seconds. Coloration: Skin is not pale. Findings: No rash. Neurological:      Mental Status: He is alert and oriented to person, place, and time. Psychiatric:         Behavior: Behavior is cooperative.          DIAGNOSTIC RESULTS       LABS:  Labs Reviewed   COMPREHENSIVE METABOLIC PANEL - Abnormal; Notable for the following components:       Result Value    Potassium 3.3 (*)     BUN 64 (*)     CREATININE 3.4 (*)     GFR Non- 17 (*)     GFR African American 21 (*)     Alkaline Phosphatase 134 (*)     ALT 48 (*)     AST 52 (*)     All other components within normal limits   CBC WITH AUTO DIFFERENTIAL - Abnormal; Notable for the following components:    WBC 11.5 (*)     RBC 4.15 (*)     Hemoglobin 12.0 (*)     Hematocrit 38.2 (*)     MCHC 31.4 (*)     Neutrophils % 86.1 (*)     Lymphocytes % 5.8 (*) Neutrophils Absolute 9.9 (*)     Lymphocytes Absolute 0.7 (*)     All other components within normal limits   POCT GLUCOSE - Abnormal; Notable for the following components:    POC Glucose 36 (*)     All other components within normal limits   POCT GLUCOSE - Abnormal; Notable for the following components:    POC Glucose 129 (*)     All other components within normal limits   POCT GLUCOSE - Normal   POCT GLUCOSE       All other labs were within normal range or not returned as of this dictation. EMERGENCY DEPARTMENT COURSE and DIFFERENTIAL DIAGNOSIS/MDM:   Vitals:    Vitals:    05/28/22 2315 05/28/22 2345 05/29/22 0000 05/29/22 0030   BP:  (!) 121/56 (!) 114/59 (!) 116/55   Pulse: 63 60 60 60   Resp: 10 18 20 16   Temp:       TempSrc:       SpO2: 94% 92% 92% 95%       MDM    Reassessment    Patient with an additional episode of hypoglycemia here in the ED. He was given a sandwich to eat along with a dose of IV glucose. We will plan to start him on D5 infusion with plans for admission to the hospitalist service for further evaluation and treatment. CONSULTS:    Case was discussed with Dr. Bre Smith regarding inpatient admission to the hospitalist service. PROCEDURES:  Unless otherwise noted below, none     Procedures    FINAL IMPRESSION      1. Acute kidney injury superimposed on CKD ([facility]    2.  Hypoglycemia secondary to sulfonylurea, accidental or unintentional, initial encounter          DISPOSITION/PLAN   DISPOSITION Decision To Admit 05/29/2022 12:53:39 AM      (Please note that portions of this note were completed with a voice recognition program.  Efforts were made to edit thedictations but occasionally words are mis-transcribed.)    Alessandra Dover MD (electronically signed)  Attending Emergency Physician          Alessandra Dover MD  06/07/22 [id number]

## 2022-05-29 NOTE — CONSULTS
[facility]  Cardiology Consult      Requesting MD:  Osmar Cunningham MD   Admit Status:         History obtained from:   [] Patient  [] Other (specify):     Patient:  Paula Connolly  [id number]     Chief Complaint:   Chief Complaint   Patient presents with    Hypoglycemia     family called EMS for combative and Altered mental status related to hypoglycemia episode was blood sugar was 31 when EMS arrive      Monitor  HPI: Mr. Ghazala Suarez is a [de-identified] y.o. male with a history of coronary artery disease previous pacemaker implantation chronic congestive heart failure admitted 5/29/2022 hypoglycemia possible seizure activity altered mental status. Note I just saw him in the office on 4/21/2022 at that time denied anginal chest pain or dyspnea or palpitations. Vital signs were stable. Patient reports device was interrogated at that time functioning appropriately. Review of Systems:  Review of Systems   Constitutional: Negative. Negative for chills, fever and unexpected weight change. HENT: Negative. Eyes: Negative. Respiratory: Negative. Negative for shortness of breath. Cardiovascular: Negative. Negative for chest pain. Gastrointestinal: Negative. Negative for diarrhea, nausea and vomiting. Endocrine: Negative. Genitourinary: Negative. Musculoskeletal: Negative. Skin: Negative. Neurological: Negative. All other systems reviewed and are negative.       Cardiac Specific Data:  Specialty Problems        Cardiology Problems    Nonsustained ventricular tachycardia (HCC)        Acute on chronic systolic (congestive) heart failure (HCC)        Carotid arterial disease (HCC)        Hyperlipidemia        PVD (peripheral vascular disease) (HCC)        Atherosclerosis of native artery of both lower extremities with intermittent claudication (HCC)        Bilateral carotid artery stenosis        Carotid stenosis, asymptomatic, left        Essential hypertension        Pure hypercholesterolemia        CHF (congestive heart failure) (HCC)        Atrial fibrillation (HCC)        Chronic diastolic congestive heart failure ([address]        Acute decompensated heart failure ([address]              Past Medical History:  Past Medical History:   Diagnosis Date    Acute liver failure without hepatic coma 10/23/2018    Back pain     \"with tired legs as a result\"    Bladder cancer ([address] 12/19/2018    Blood circulation, collateral     Carotid arterial disease (HCC)     recent surgery    CKD (chronic kidney disease), 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/2018    Constipation     COPD with acute lower respiratory infection ([address] 02/24/2021    Epigastric discomfort     GERD (gastroesophageal reflux disease)     Hyperlipidemia     Hypertension     Hypertension     Pacemaker     Palliative care patient 10/23/2018    Pneumonia due to infectious organism 11/06/2018    Primary osteoarthritis of left knee 10/14/2018    PVD [facility]peripheral vascular disease) [facility]     Tremor     Tremor on Right side x 1-2 weeks per stepdaughter    Type 2 diabetes mellitus with complication, without long-term current use of insulin ([facility] 01/21/2021        Past Surgical History:  Past Surgical History:   Procedure Laterality Date    BACK SURGERY      CARDIAC CATHETERIZATION  03/23/2021    100% RCA    COLONOSCOPY  2007?     CYSTOSCOPY Bilateral 12/19/2018    CYSTOSCOPY, BIOSPY FULGURATION OF BLADDER TUMOR POSSIBLE TURBT, RETROGRADE PYELOGRAM performed by Kai Quiñones MD at [address] COLONOSCOPY FLX DX W/COLLJ SPEC WHEN PFRMD N/A 09/11/2017    Dr Gold Tapia internal hemorrhoids, diverticular disease-HP-No recall (age)   [place] REVISE MEDIAN N/CARPAL TUNNEL SURG Left 07/18/2018    OPEN CARPAL TUNNEL RELEASE performed by Lacy Murphy MD at [address] Left 08/28/2018    LEFT CAROTID ENDARTERECTOMY WITH VEIN PATCH ANGIOPLASTY AND COMPLETION ANGIOGRAM performed by Henrry Mehta MD at [facility]  AL TOTAL KNEE ARTHROPLASTY Left 10/15/2018    LEFT COMPLEX TOTAL KNEE ARTHROPLASTY performed by Rosario Mensah MD at [address] [place] [address] GASTROINTESTINAL ENDOSCOPY N/A 11/18/2021    Dr Joe Giordano, Sub prep lg amount of solid and semisolid food/Medication in stomach body/antrum/pylorus, stomach lumen appeared to distended w air insufflation and collapse upon suction,   inadequate exam sugg of gastroparesis, repeat in 10-14 days    UPPER GASTROINTESTINAL ENDOSCOPY N/A 01/06/2022    Dr Joe Giordano, distal stomach body likely GIST nodule-Submucosal fundic body gastric mucosa, Benign, (-)Hpylori, (-)Sprue    UPPER GASTROINTESTINAL ENDOSCOPY N/A 03/07/2022    Dr Joe Giordano, EUS submucosal gastric nodule, otherwise normal, 1 year recall    UPPER GASTROINTESTINAL ENDOSCOPY N/A 03/07/2022    Dr Joe Giordano, W EUS,  nodular area at end of rugal gold in distal stomach body    VASCULAR SURGERY  04/21/2015    Ike BESS Ultrasound guided access of left common femoral artery. Aortogram.Diagnostic right lower extremity arteriogram.Radiologic supervision and interpretation.  VASCULAR SURGERY  01/13/2015    Ike Lewis M.D Atherectomy,angioplasty,and stenting of left superficial femoral artery.  VASCULAR SURGERY  03/11/2014    Ike Lewis M.D. Ultrasound-guided access of right common femoral artery. Aortogram.Left lower extremity arteriogram.Atherectomy and angioplasty of left superficial femoral artery. Radiologic supervision and interpretation.  VASCULAR SURGERY  01/18/2013    Ike BESS Aortogram.Multistation arteriogram right lower extremity. Laser atherectomy and angioplasty of right superficial femoral artery. Selective catheterization of right tibioperoneal trunk. Angioplasty of peroneal artery and tibioperoneal trunk.  VASCULAR SURGERY  10/30/2018    [facility]. Ultrasound guided cannulation of right internal vein. Placement of right internal jugular vein tunneled dialysis catheter bard overton xk 23cm tip to cuff    VASCULAR SURGERY  2018    [facility]. Removal of tunneled dilaysis catheter right internal jugular vein. Past Family History:  Family History   Problem Relation Age of Onset    Colon Cancer Father     Diabetes Brother     Colon Polyps Neg Hx     Liver Cancer Neg Hx     Liver Disease Neg Hx     Esophageal Cancer Neg Hx     Rectal Cancer Neg Hx     Stomach Cancer Neg Hx        Past Social History:  Social History     Socioeconomic History    Marital status:      Spouse name: Not on file    Number of children: Not on file    Years of education: Not on file    Highest education level: Not on file   Occupational History    Not on file   Tobacco Use    Smoking status: Former Smoker     Packs/day: 2.00     Years: 48.00     Pack years: 96.00     Types: Cigarettes     Quit date: 6/3/2003     Years since quittin.0    Smokeless tobacco: Never Used   Vaping Use    Vaping Use: Never used   Substance and Sexual Activity    Alcohol use: Yes     Alcohol/week: 12.0 standard drinks     Types: 12 Glasses of wine per week     Comment: 1 glasses of wine every night    Drug use: No    Sexual activity: Yes     Partners: Female   Other Topics Concern    Not on file   Social History Narrative    Not on file     Social Determinants of Health     Financial Resource Strain:     Difficulty of Paying Living Expenses: Not on file   Food Insecurity:     Worried About Running Out of Food in the Last Year: Not on file    Ankit of Food in the Last Year: Not on file   Transportation Needs:     Lack of Transportation (Medical): Not on file    Lack of Transportation (Non-Medical):  Not on file   Physical Activity:     Days of Exercise per Week: Not on file    Minutes of Exercise per Session: Not on file   Stress:     Feeling of Stress : Not on file   Social Connections:     Frequency of Communication with Friends and Family: Not on file    Frequency of Social Gatherings with Friends and Family: Not on file    Attends Anabaptism Services: Not on file    Active Member of Clubs or Organizations: Not on file    Attends Club or Organization Meetings: Not on file    Marital Status: Not on file   Intimate Partner Violence:     Fear of Current or Ex-Partner: Not on file    Emotionally Abused: Not on file    Physically Abused: Not on file    Sexually Abused: Not on file   Housing Stability:     Unable to Pay for Housing in the Last Year: Not on file    Number of [place] in the Last Year: Not on file    Unstable Housing in the Last Year: Not on file       Allergies: Allergies   Allergen Reactions    Eliquis [Apixaban] Other (See Comments)     \"almost bled to death\"    Promethazine Hcl Other (See Comments)     He became encephalopathic for several hours and was not responsive. Home Meds:  Prior to Admission medications    Medication Sig Start Date End Date Taking?  Authorizing Provider   bumetanide (BUMEX) 1 MG tablet Take 2 tablets by mouth daily 5/26/22 8/24/22  Antonio Perez MD   metOLazone (ZAROXOLYN) 2.5 MG tablet Take 1 tablet by mouth daily 5/26/22 8/24/22  Antonio Perez MD   mirabegron (MYRBETRIQ) 25 MG TB24 Take 1 tablet by mouth daily 5/20/22   ELISABETH Sorto CNP   amiodarone (CORDARONE) 200 MG tablet TAKE 1 TABLET BY MOUTH DAILY  Patient taking differently: Take 200 mg by mouth 2 times daily  5/18/22   ELISABETH Whitt   TRULANCE 3 MG TABS TAKE 1 TABLET BY MOUTH DAILY 3/11/22   ELISABETH Shell NP   UNABLE TO FIND Take 20 mg by mouth daily trosopium    Historical Provider, MD   tamsulosin (FLOMAX) 0.4 MG capsule Take 2 capsules by mouth daily 12/13/21   Humphrey Cruz MD   pantoprazole (PROTONIX) 20 MG tablet Take 40 mg by mouth daily     Historical Provider, MD   SENNA PLUS 8.6-50 MG per tablet  10/26/21   Historical Provider, MD   glipiZIDE (GLUCOTROL XL) 10 MG extended release tablet 2.5 mg daily He is not ill-appearing, toxic-appearing or diaphoretic. HENT:      Head: Normocephalic and atraumatic. Nose: Nose normal.      Mouth/Throat:      Mouth: Mucous membranes are moist.      Pharynx: Oropharynx is clear. Eyes:      General: No scleral icterus. Extraocular Movements: Extraocular movements intact. Pupils: Pupils are equal, round, and reactive to light. Neck:      Vascular: No carotid bruit or JVD. Cardiovascular:      Rate and Rhythm: Normal rate and regular rhythm. Heart sounds: Normal heart sounds. No murmur heard. No friction rub. No gallop. Pulmonary:      Effort: Pulmonary effort is normal. No respiratory distress. Breath sounds: Normal breath sounds. No stridor. No wheezing, rhonchi or rales. Chest:      Chest wall: No tenderness. Abdominal:      General: Abdomen is flat. Bowel sounds are normal. There is no distension. Palpations: Abdomen is soft. There is no mass. Tenderness: There is no abdominal tenderness. There is no right CVA tenderness, left CVA tenderness, guarding or rebound. Hernia: No hernia is present. Musculoskeletal:         General: Swelling present. No tenderness, deformity or signs of injury. Cervical back: Normal range of motion and neck supple. No rigidity or tenderness. Right lower leg: Edema present. Left lower leg: Edema present. Lymphadenopathy:      Cervical: No cervical adenopathy. Skin:     General: Skin is warm and dry. Neurological:      General: No focal deficit present. Mental Status: He is alert and oriented to person, place, and time. Mental status is at baseline. Cranial Nerves: No cranial nerve deficit. Sensory: No sensory deficit. Motor: No weakness. Coordination: Coordination normal.   Psychiatric:         Mood and Affect: Mood normal.         Behavior: Behavior normal.         Thought Content:  Thought content normal.         Judgment: Judgment normal. Labs:  Recent Labs     05/28/22 2109   WBC 11.5*   HGB 12.0*          Recent Labs     05/26/22  1014 05/28/22 2109    142   K 3.5 3.3*    100   CO2 26 28   BUN 54* 64*   CREATININE 2.9* 3.4*   LABGLOM 21* 17*   MG  --  2.0   CALCIUM 9.7 9.2       CK, CKMB, Troponin: @LABRCNT (CKTOTAL:3, CKMB:3, TROPONINI:3)@    Last 3 BNP:  No results for input(s): BNP in the last 72 hours. IMAGING:  ECHO Complete 2D W Doppler W Color    Result Date: 5/25/2022  Transthoracic Echocardiography Report (TTE)  Demographics   Patient Name  Charu Castro Date of Study          05/25/2022   MRN           [id number]             Gender                 Male   Date of Birth 1940         Room Number            MHL-0717   Age           [de-identified] year(s)   Height:       72 inches          Referring Physician    Keith Thrasher MD   Weight:       222 pounds         Sonographer   BSA:          2.23 m^2           Interpreting Physician Keith Thrasher MD   BMI:          30.11 kg/m^2  Procedure Type of Study   TTE procedure:ECHO NO CONTRAST WITH DOP/COLR. Study Location: Echo Lab Technical Quality: Adequate visualization Patient Status: Inpatient Rhythm: Within normal limits HR: 63 bpm BP: 134/59 mmHg Indications:Congestive heart failure. Conclusions   Summary  Mitral valve leaflets are mildly thickened with preserved leaflet  mobility. Mild mitral regurgitation is present. Mild aortic stenosis. Mean gradient 8 mm hg  Tricuspid valve is structurally normal.  Left ventricular ejection fraction is visually estimated at 50-55%. Normal left ventricular wall thickness. No regional wall motion abnormalities.    Signature   ----------------------------------------------------------------  Electronically signed by Keith Thrasher MD(Interpreting  physician) on 05/25/2022 04:53 PM  ----------------------------------------------------------------   Findings   Mitral Valve  Mitral valve leaflets are mildly thickened with preserved leaflet  mobility. Mild mitral regurgitation is present. Aortic Valve  Mild aortic stenosis. Mean gradient 8 mm hg   Tricuspid Valve  Tricuspid valve is structurally normal.   Pulmonic Valve  The pulmonic valve was not well visualized . Left Atrium  Normal size left atrium. Left Ventricle  Left ventricular ejection fraction is visually estimated at 50-55%. Normal left ventricular wall thickness. No regional wall motion abnormalities. Right Atrium  Normal right atrial dimension with no evidence of thrombus or mass noted. Right Ventricle  Normal right ventricular size with preserved RV function. Pericardial Effusion  No evidence of significant pericardial effusion is noted. Pleural Effusion  No evidence of pleural effusion. Allergies   - Other allergy:(Eliquis). - Phenergan. 2D Measurements and Calculations(cm)   LVIDd: 5.38 cm                      LVIDs: 3.94 cm  IVSd: 1.23 cm  LVPWd: 0.98 cm                      AO Root Dimension: 2.4 cm  Rt. Vent.  Dimension: 2.41 cm        LA Dimension: 4.3 cm  % Ejection Fraction: 55 %           LA Area: 18.3 cm^2  LA Volume: 43.7 ml                  LV Systolic dimension: 2.27MG  LA Volume Index: 20 ml/m^2          LV PW diastolic: 6.90PC  LV dimension: 5.38 cm               LVOT diameter: 2 cm                                      RA Systolic pressure: 3mmHg                                      RV Diastolic dimension: 5.04FT  Cardic Output (CO): 5.46l/min  Doppler Measurements and Calculations   AV Peak Velocity:196 cm/s              MV Peak E-Wave: 125 cm/s  AV Mean Velocity:135 cm/s              MV Peak A-Wave: 74.1 cm/s  AV Peak Gradient: 15.37 mmHg           MV E/A Ratio: 1.69 %  AV Mean Gradient: 8 mmHg               MV Mean velocity: 71.8cm/s  AV Area (Continuity):1.92 cm^2         MV Peak Gradient: 6.25 mmHg  AV VTI:45.1 cm/s                       MV Mean gradient: 2 mmHg                                         MV P1/2t: 96 msec  Estimated RAP:3 mmHg                   MVA by PHT2.29 cm^2   MV E' septal velocity: 7.4cm/s  MV E' lateral velocity:9.25 cm/s      XR CHEST PORTABLE    Result Date: 5/24/2022  XR CHEST PORTABLE 5/24/2022 5:11 PM HISTORY: Shortness of breath  Technique: Single AP view of the chest COMPARISONS: Chest exam dated 11/23/2021 FINDINGS: Mild cardiomegaly. Central pulmonary vascular congestion. Bilateral interstitial coarsening and patchy perihilar and infrahilar opacities, appearance favoring a developing pulmonary edema. No dense consolidation. Elevation of the right hemidiaphragm which is likely chronic. No pleural effusion or pneumothorax. Left chest wall dual chamber pacemaker appears stable. No acute bony abnormality. 1. Appearance favors interstitial edema with developing perihilar and infrahilar alveolar edema. No consolidation or obvious pleural effusion. Signed by Dr Roxie Harding      Assessment:  1. Altered mental status reported  2. Hypoglycemia  3. Possible seizure activity  4. Pacemaker  5. History of nonsustained ventricular tachycardia  6. Long-term antiarrhythmic drug usage  7. Carotid arterial disease  8. Hyperlipidemia  9. Peripheral vascular disease  10. Tremor  11. Urinary incontinence  12. Hypoxemia oxygen dependent  13. History of diverticular disease  14. Osteoarthritis  15. Hypertension  16. Iron deficiency anemia  17. Gastroesophageal reflux disease  18. History of liver failure  19. Bilateral pleural effusions  20. Benign prostatic hyperplasia  21. Epistaxis/bleeding diathesis  22. History of thrombocytopenia  23. History of bladder cancer  24. Chronic respiratory failure with hypoxia and hypercapnia  25. Chronic kidney disease  26. Diabetes mellitus type 2  27. History atrial fibrillation  28. COPD  34. Alcohol usage  30. Vitamin D deficiency  31. Obesity  32. Obstructive sleep apnea  33. History hematuria  34. History of hyperkalemia  35.  CTA pulmonary 3/6/2021 no evidence of pulmonary embolism slight progression of bilateral right greater than left consolidation suspicious for pneumonia small bilateral pleural effusions and interstitial pulmonary edema  36. CT of the head 2/18/2021 no acute abnormalities moderate cerebral cortical atrophy extensive chronic white matter ischemic changes  37. CT chest 2/13/2021 consolidating infiltrates of both lungs greatest in the right lower lobe and most compatible with bilateral pneumonia small right pleural effusion trace left pleural effusion no pneumothorax no abscess or areas of cavitation mild underlying changes of paraseptal emphysema noted no lymphadenopathy noted  38. Echocardiogram 5/4/2022 mild MR mild aortic stenosis mean gradient 8 left ventricular function satisfactory EF 50 to 55%  39. Cardiac catheterization 3/6/2021 severe one-vessel disease with 100% occlusion right coronary artery medical management recommended      Recommendations:  1.  Continue current cardiovascular medications

## 2022-05-30 LAB
ALBUMIN SERPL-MCNC: 3.5 G/DL (ref 3.5–5.2)
ALP BLD-CCNC: 97 U/L (ref 40–130)
ALT SERPL-CCNC: 36 U/L (ref 5–41)
ANION GAP SERPL CALCULATED.3IONS-SCNC: 11 MMOL/L (ref 7–19)
APTT: 30.3 SEC (ref 26–36.2)
AST SERPL-CCNC: 23 U/L (ref 5–40)
BASOPHILS ABSOLUTE: 0.1 K/UL (ref 0–0.2)
BASOPHILS RELATIVE PERCENT: 1 % (ref 0–1)
BILIRUB SERPL-MCNC: 0.3 MG/DL (ref 0.2–1.2)
BLOOD CULTURE, ROUTINE: ABNORMAL
BLOOD CULTURE, ROUTINE: ABNORMAL
BUN BLDV-MCNC: 55 MG/DL (ref 8–23)
CALCIUM SERPL-MCNC: 8.6 MG/DL (ref 8.8–10.2)
CHLORIDE BLD-SCNC: 97 MMOL/L (ref 98–111)
CO2: 27 MMOL/L (ref 22–29)
CREAT SERPL-MCNC: 3 MG/DL (ref 0.5–1.2)
EOSINOPHILS ABSOLUTE: 0 K/UL (ref 0–0.6)
EOSINOPHILS RELATIVE PERCENT: 0 % (ref 0–5)
GFR AFRICAN AMERICAN: 24
GFR NON-AFRICAN AMERICAN: 20
GLUCOSE BLD-MCNC: 132 MG/DL (ref 74–109)
GLUCOSE BLD-MCNC: 139 MG/DL (ref 70–99)
GLUCOSE BLD-MCNC: 197 MG/DL (ref 70–99)
GLUCOSE BLD-MCNC: 214 MG/DL (ref 70–99)
GLUCOSE BLD-MCNC: 217 MG/DL (ref 70–99)
HBA1C MFR BLD: 5.7 % (ref 4–6)
HCT VFR BLD CALC: 35 % (ref 42–52)
HEMOGLOBIN: 10.9 G/DL (ref 14–18)
IMMATURE GRANULOCYTES #: 0.3 K/UL
INR BLD: 1.13 (ref 0.88–1.18)
LACTIC ACID: 0.8 MMOL/L (ref 0.5–1.9)
LYMPHOCYTES ABSOLUTE: 1.4 K/UL (ref 1.1–4.5)
LYMPHOCYTES RELATIVE PERCENT: 18.8 % (ref 20–40)
MCH RBC QN AUTO: 28.9 PG (ref 27–31)
MCHC RBC AUTO-ENTMCNC: 31.1 G/DL (ref 33–37)
MCV RBC AUTO: 92.8 FL (ref 80–94)
MONOCYTES ABSOLUTE: 0.7 K/UL (ref 0–0.9)
MONOCYTES RELATIVE PERCENT: 9.9 % (ref 0–10)
NEUTROPHILS ABSOLUTE: 4.8 K/UL (ref 1.5–7.5)
NEUTROPHILS RELATIVE PERCENT: 65.9 % (ref 50–65)
ORGANISM: ABNORMAL
PARATHYROID HORMONE INTACT: 82.6 PG/ML (ref 15–65)
PDW BLD-RTO: 13.8 % (ref 11.5–14.5)
PERFORMED ON: ABNORMAL
PLATELET # BLD: 153 K/UL (ref 130–400)
PMV BLD AUTO: 9.8 FL (ref 9.4–12.4)
POTASSIUM REFLEX MAGNESIUM: 3.6 MMOL/L (ref 3.5–5)
PROTHROMBIN TIME: 14.5 SEC (ref 12–14.6)
RBC # BLD: 3.77 M/UL (ref 4.7–6.1)
SODIUM BLD-SCNC: 135 MMOL/L (ref 136–145)
TOTAL PROTEIN: 5.4 G/DL (ref 6.6–8.7)
WBC # BLD: 7.3 K/UL (ref 4.8–10.8)

## 2022-05-30 PROCEDURE — 2580000003 HC RX 258: Performed by: NURSE PRACTITIONER

## 2022-05-30 PROCEDURE — 1210000000 HC MED SURG R&B

## 2022-05-30 PROCEDURE — 6360000002 HC RX W HCPCS: Performed by: INTERNAL MEDICINE

## 2022-05-30 PROCEDURE — 80053 COMPREHEN METABOLIC PANEL: CPT

## 2022-05-30 PROCEDURE — 36415 COLL VENOUS BLD VENIPUNCTURE: CPT

## 2022-05-30 PROCEDURE — 85730 THROMBOPLASTIN TIME PARTIAL: CPT

## 2022-05-30 PROCEDURE — 2580000003 HC RX 258: Performed by: INTERNAL MEDICINE

## 2022-05-30 PROCEDURE — 82947 ASSAY GLUCOSE BLOOD QUANT: CPT

## 2022-05-30 PROCEDURE — 87040 BLOOD CULTURE FOR BACTERIA: CPT

## 2022-05-30 PROCEDURE — 83970 ASSAY OF PARATHORMONE: CPT

## 2022-05-30 PROCEDURE — 85610 PROTHROMBIN TIME: CPT

## 2022-05-30 PROCEDURE — 83605 ASSAY OF LACTIC ACID: CPT

## 2022-05-30 PROCEDURE — 85025 COMPLETE CBC W/AUTO DIFF WBC: CPT

## 2022-05-30 PROCEDURE — 6370000000 HC RX 637 (ALT 250 FOR IP): Performed by: HOSPITALIST

## 2022-05-30 PROCEDURE — 83036 HEMOGLOBIN GLYCOSYLATED A1C: CPT

## 2022-05-30 RX ORDER — TAMSULOSIN HYDROCHLORIDE 0.4 MG/1
0.4 CAPSULE ORAL DAILY
Status: DISCONTINUED | OUTPATIENT
Start: 2022-05-30 | End: 2022-05-31 | Stop reason: HOSPADM

## 2022-05-30 RX ORDER — SODIUM CHLORIDE 9 MG/ML
INJECTION, SOLUTION INTRAVENOUS CONTINUOUS
Status: DISCONTINUED | OUTPATIENT
Start: 2022-05-30 | End: 2022-05-31 | Stop reason: HOSPADM

## 2022-05-30 RX ORDER — AMIODARONE HYDROCHLORIDE 200 MG/1
200 TABLET ORAL DAILY
Status: DISCONTINUED | OUTPATIENT
Start: 2022-05-30 | End: 2022-05-31 | Stop reason: HOSPADM

## 2022-05-30 RX ORDER — NIFEDIPINE 60 MG/1
60 TABLET, EXTENDED RELEASE ORAL DAILY
Status: DISCONTINUED | OUTPATIENT
Start: 2022-05-30 | End: 2022-05-31 | Stop reason: HOSPADM

## 2022-05-30 RX ORDER — ASPIRIN 81 MG/1
81 TABLET ORAL DAILY
Status: DISCONTINUED | OUTPATIENT
Start: 2022-05-30 | End: 2022-05-31 | Stop reason: HOSPADM

## 2022-05-30 RX ORDER — ATORVASTATIN CALCIUM 20 MG/1
20 TABLET, FILM COATED ORAL NIGHTLY
Status: DISCONTINUED | OUTPATIENT
Start: 2022-05-30 | End: 2022-05-31 | Stop reason: HOSPADM

## 2022-05-30 RX ORDER — METOPROLOL SUCCINATE 50 MG/1
100 TABLET, EXTENDED RELEASE ORAL DAILY
Status: DISCONTINUED | OUTPATIENT
Start: 2022-05-30 | End: 2022-05-31 | Stop reason: HOSPADM

## 2022-05-30 RX ORDER — PANTOPRAZOLE SODIUM 40 MG/1
40 TABLET, DELAYED RELEASE ORAL
Status: DISCONTINUED | OUTPATIENT
Start: 2022-05-30 | End: 2022-05-31 | Stop reason: HOSPADM

## 2022-05-30 RX ADMIN — SODIUM CHLORIDE, PRESERVATIVE FREE 10 ML: 5 INJECTION INTRAVENOUS at 09:10

## 2022-05-30 RX ADMIN — ATORVASTATIN CALCIUM 20 MG: 20 TABLET, FILM COATED ORAL at 19:34

## 2022-05-30 RX ADMIN — SODIUM CHLORIDE: 9 INJECTION, SOLUTION INTRAVENOUS at 15:01

## 2022-05-30 RX ADMIN — TAMSULOSIN HYDROCHLORIDE 0.4 MG: 0.4 CAPSULE ORAL at 09:10

## 2022-05-30 RX ADMIN — SODIUM CHLORIDE, PRESERVATIVE FREE 10 ML: 5 INJECTION INTRAVENOUS at 19:34

## 2022-05-30 RX ADMIN — PANTOPRAZOLE SODIUM 40 MG: 40 TABLET, DELAYED RELEASE ORAL at 09:10

## 2022-05-30 RX ADMIN — ASPIRIN 81 MG: 81 TABLET, COATED ORAL at 09:10

## 2022-05-30 RX ADMIN — NIFEDIPINE 60 MG: 60 TABLET, EXTENDED RELEASE ORAL at 09:10

## 2022-05-30 RX ADMIN — VANCOMYCIN HYDROCHLORIDE 2500 MG: 10 INJECTION, POWDER, LYOPHILIZED, FOR SOLUTION INTRAVENOUS at 01:33

## 2022-05-30 RX ADMIN — AMIODARONE HYDROCHLORIDE 200 MG: 200 TABLET ORAL at 09:10

## 2022-05-30 RX ADMIN — METOPROLOL SUCCINATE 100 MG: 50 TABLET, EXTENDED RELEASE ORAL at 09:10

## 2022-05-30 RX ADMIN — DEXTROSE AND SODIUM CHLORIDE: 5; 450 INJECTION, SOLUTION INTRAVENOUS at 01:33

## 2022-05-30 ASSESSMENT — ENCOUNTER SYMPTOMS
NAUSEA: 0
SHORTNESS OF BREATH: 0
WHEEZING: 0
COLOR CHANGE: 0
BLOOD IN STOOL: 0
COUGH: 0
VOMITING: 0
CONSTIPATION: 0
DIARRHEA: 0
ABDOMINAL PAIN: 0
ABDOMINAL DISTENTION: 0

## 2022-05-30 NOTE — PROGRESS NOTES
[facility]      Progress Note    Patient:  Harrison Boo  YOB: 1940  Date of Service: 5/30/2022  MRN: [id number]   Acct: [de-identified]   Primary Care Physician: Kelly Lindsey MD  Advance Directive: Full Code  Admit Date: 5/28/2022       Hospital Day: 1    Portions of this note have been copied forward, however, updated to reflect the most current clinical status of this patient. CHIEF COMPLAINT Hypoglycemia     SUBJECTIVE:  Mr. Gianni Zayas was resting in bed this morning. Stated he is feeling better today. Alert and oriented x3. CUMULATIVE HOSPITAL COURSE:   The patient is a 60-year-old male with past medical history of CKD 2, COPD, hypertension, hyperlipidemia, PVD, diabetes and GERD who presented to [facility] ED with complaints of hypoglycemia. Patient was noted to very combative at home with altered mental status. EMS was called. Accucheck showed blood glucose of 31, was given amp of D50 per EMS. Accucheck Recheck in ED was noted to be 36, received more D50 in ED and then started on D5 1/2 NS gtt. Further workup in ED revealed K+ 3.3, Cr 3.4, GFR 17, ( Previous Cr 2.9 on 5/26/2022), WBC 11.5, Hgb 12.0. Off note, patient was recently admitted here for acute on chronic CHF and was sent home on Bumex. Patient was admitted to hospital medicine for ANA on CKD and hypoglycemia with nephrology consultation. Renal function improved slightly with IVFs. Hypoglycemia improved, D5 drip discontinued. Review of Systems   Constitutional: Negative for chills, diaphoresis, fatigue and fever. HENT: Negative for congestion and ear pain. Eyes: Negative for visual disturbance. Respiratory: Negative for cough, shortness of breath and wheezing. Denies SOB   Cardiovascular: Negative for chest pain, palpitations and leg swelling. Denies chest pain    Gastrointestinal: Negative for abdominal distention, abdominal pain, blood in stool, constipation, diarrhea, nausea and vomiting. Endocrine: Negative for cold intolerance and heat intolerance. Genitourinary: Negative for difficulty urinating, flank pain, frequency and urgency. Musculoskeletal: Negative for arthralgias and myalgias. Skin: Negative for color change and wound. Neurological: Negative for dizziness, syncope, weakness, light-headedness, numbness and headaches. Hematological: Does not bruise/bleed easily. Psychiatric/Behavioral: Negative for agitation, confusion and dysphoric mood. Objective:   VITALS:  BP (!) 138/59   Pulse 59   Temp 96.8 °F (36 °C) (Temporal)   Resp 16   Ht 6' (1.829 m)   Wt 212 lb 12.8 oz (96.5 kg)   SpO2 96%   BMI 28.86 kg/m²   24HR INTAKE/OUTPUT:      Intake/Output Summary (Last 24 hours) at 5/30/2022 1349  Last data filed at 5/30/2022 0953  Gross per 24 hour   Intake 210 ml   Output 1300 ml   Net -1090 ml         Physical Exam  Constitutional:       General: He is not in acute distress. Appearance: Normal appearance. He is not ill-appearing. HENT:      Head: Normocephalic and atraumatic. Right Ear: External ear normal.      Left Ear: External ear normal.      Nose: Nose normal.      Mouth/Throat:      Mouth: Mucous membranes are moist.   Eyes:      Extraocular Movements: Extraocular movements intact. Conjunctiva/sclera: Conjunctivae normal.      Pupils: Pupils are equal, round, and reactive to light. Cardiovascular:      Rate and Rhythm: Normal rate and regular rhythm. Pulses: Normal pulses. Heart sounds: Normal heart sounds. Pulmonary:      Effort: Pulmonary effort is normal. No respiratory distress. Breath sounds: Normal breath sounds. No wheezing, rhonchi or rales. Abdominal:      General: Bowel sounds are normal. There is no distension. Palpations: Abdomen is soft. Tenderness: There is no abdominal tenderness. Musculoskeletal:         General: No swelling, tenderness or deformity. Normal range of motion.       Cervical back: Normal range of motion and neck supple. No muscular tenderness. Right lower leg: No edema. Left lower leg: No edema. Skin:     General: Skin is warm and dry. Findings: No bruising or lesion. Neurological:      Mental Status: He is alert and oriented to person, place, and time. Psychiatric:         Mood and Affect: Mood normal.         Behavior: Behavior normal.         Thought Content: Thought content normal.            Medications:      sodium chloride      sodium chloride        vancomycin (VANCOCIN) intermittent dosing (placeholder)   Other RX Placeholder    amiodarone  200 mg Oral Daily    tamsulosin  0.4 mg Oral Daily    pantoprazole  40 mg Oral QAM AC    aspirin  81 mg Oral Daily    atorvastatin  20 mg Oral Nightly    NIFEdipine  60 mg Oral Daily    metoprolol succinate  100 mg Oral Daily    sodium chloride flush  10 mL IntraVENous 2 times per day    potassium chloride  40 mEq Oral Once     sodium chloride flush, sodium chloride, potassium chloride **OR** potassium alternative oral replacement **OR** potassium chloride, magnesium sulfate, ondansetron **OR** ondansetron, polyethylene glycol, acetaminophen **OR** acetaminophen, dextrose bolus **OR** dextrose bolus  ADULT DIET; Regular; 3 carb choices (45 gm/meal); Low Fat/Low Chol/High Fiber/MAGED; 1500 ml     Lab and other Data:     Recent Labs     05/28/22 2109 05/30/22 0722   WBC 11.5* 7.3   HGB 12.0* 10.9*    153     Recent Labs     05/28/22 2109 05/28/22 2324 05/30/22 0722     --  135*   K 3.3*  --  3.6     --  97*   CO2 28  --  27   BUN 64*  --  55*   CREATININE 3.4*  --  3.0*   GLUCOSE 77 36 132*     Recent Labs     05/28/22 2109 05/30/22 0722   AST 52* 23   ALT 48* 36   BILITOT 0.4 0.3   ALKPHOS 134* 97     INR:   Recent Labs     05/30/22 0722   INR 1.13       RAD:   No results found.         Micro:      Culture, Blood 1 [[id number]] (Abnormal) Collected: 05/29/22 0309   Order Status: Completed Specimen: Blood Updated: 05/30/22 1021    Blood Culture, Routine   Bottle volume = 6 ml Abnormal     Organism Staphylococcus hominis Abnormal     Blood Culture, Routine --    Isolated from Aerobic and Anaerobic bottle   No further workup   This organism was isolated from one set. Susceptibility testing is not routinely done as this   organism frequently represents skin contamination. Additional testing can be ordered by calling the   Microbiology Department. Culture, Blood 2 [[id number]] Collected: 05/29/22 0715   Order Status: Completed Specimen: Blood Updated: 05/30/22 0914    Culture, Blood 2 No Growth to date.  Any change in status will be called. Assessment/Plan   Principal Problem:    Acute kidney injury superimposed on CKD ([facility] [address]  Active Problems:    Hyperlipidemia    Acute renal failure (ARF) (HCC)    Hypoglycemia  Resolved Problems:    * No resolved hospital problems. *      Principal Problem:    Acute kidney injury superimposed on CKD 4 ([facility] [address]-    - Nephrology following               - Creatinine 3.0 today               - Continue slow IVFs per nephrology               - Monitor I's and O's closely              - Monitor labs closely              - Avoid hypotension              - Avoid nephrotoxic agents         Active Problems:    Hypoglycemia-    - Improving    - Dc'd D5    - Monitor Accuchecks closely    - Hypoglycemia treatment protocol in place       Hyperlipidemia- continue statin       Positive Blood cultures- Blood cultures positive for staph hominis, skin contamination, repeat blood culture            DVT Prophylaxis: SCDs      GI prophylaxis:  Protonix      Discharge planning: TBD       Further Orders per Clinical course/attending. Electronically signed by ELISABETH Rosen CNP on 5/30/2022 at 1:49 PM       EMR Dragon/Transcription disclaimer:   Much of this encounter note is an electronic transcription/translation of spoken language to printed text.  The electronic translation of spoken language may permit erroneous, or at times, nonsensical words or phrases to be inadvertently transcribed; although attempts have made to review the note for such errors, some may still exist.

## 2022-05-30 NOTE — PLAN OF CARE
Problem: Discharge Planning  Goal: Discharge to home or other facility with appropriate resources  Outcome: Progressing  Flowsheets (Taken 5/29/2022 1007 by Sohail Paredes RN)  Discharge to home or other facility with appropriate resources: Identify barriers to discharge with patient and caregiver     Problem: Skin/Tissue Integrity  Goal: Absence of new skin breakdown  Description: 1. Monitor for areas of redness and/or skin breakdown  2. Assess vascular access sites hourly  3. Every 4-6 hours minimum:  Change oxygen saturation probe site  4. Every 4-6 hours:  If on nasal continuous positive airway pressure, respiratory therapy assess nares and determine need for appliance change or resting period.   Outcome: Progressing     Problem: Safety - Adult  Goal: Free from fall injury  Outcome: Progressing  Flowsheets (Taken 5/29/2022 1010 by Sohail Paredes RN)  Free From Fall Injury: Instruct family/caregiver on patient safety

## 2022-05-30 NOTE — PROGRESS NOTES
Nephrology ([facility]) Progress Note    Patient:  Daryle Connors  YOB: 1940  Date of Service: 5/30/2022  MRN: [id number]   Acct: [de-identified]   Primary Care Physician: Juan Daniel Bowen MD  Advance Directive: Full Code  Admit Date: 5/28/2022       Hospital Day: 1  Referring Provider: Elle Shirley MD    Patient independently seen and examined, Chart, Consults, Notes, Operative notes, Labs, Cardiology, and Radiology studies reviewed as available. Chief complaint: Abnormal labs. Subjective:  Daryle Connors is a [de-identified] y.o. male for whom we were consulted for evaluation and treatment of acute kidney injury/chronic kidney disease. He has history of stage IV chronic kidney disease, follows ELISABETH Davis in the office. Presented to the emergency room with severe hypoglycemia. He was treated with D10. Incidental finding on the lab consistent with worsening of renal function and nephrology was consulted. He is currently receiving IV fluid and has slow improvement of renal function. Patient also has history of type 2 diabetes, hypertension, chronic kidney disease and chronic obstructive pulm disease. This morning he feels well denies any shortness of breath. His hypoglycemia has been resolved as he has been eating well.     Allergies:  Eliquis [apixaban] and Promethazine hcl    Medicines:  Current Facility-Administered Medications   Medication Dose Route Frequency Provider Last Rate Last Admin    vancomycin (VANCOCIN) intermittent dosing (placeholder)   Other RX Placeholder Patience Tawanda, DO        amiodarone (CORDARONE) tablet 200 mg  200 mg Oral Daily Elle Shirley MD   200 mg at 05/30/22 0910    tamsulosin (FLOMAX) capsule 0.4 mg  0.4 mg Oral Daily Elle Shirley MD   0.4 mg at 05/30/22 0910    pantoprazole (PROTONIX) tablet 40 mg  40 mg Oral QAM AC Elle Shirley MD   40 mg at 05/30/22 0910    aspirin EC tablet 81 mg  81 mg Oral Daily Macrina Pu MD Tiffany   81 mg at 05/30/22 0910    atorvastatin (LIPITOR) tablet 20 mg  20 mg Oral Nightly Osmar Cunningham MD        NIFEdipine (PROCARDIA XL) extended release tablet 60 mg  60 mg Oral Daily Osmar Cunningham MD   60 mg at 05/30/22 0910    metoprolol succinate (TOPROL XL) extended release tablet 100 mg  100 mg Oral Daily Osmar Cunningham MD   100 mg at 05/30/22 0910    dextrose 5 % and 0.45 % sodium chloride infusion   IntraVENous Continuous Patience Tawanda, DO 75 mL/hr at 05/30/22 0133 New Bag at 05/30/22 0133    sodium chloride flush 0.9 % injection 10 mL  10 mL IntraVENous 2 times per day Patience Tawanda, DO   10 mL at 05/30/22 0910    sodium chloride flush 0.9 % injection 10 mL  10 mL IntraVENous PRN Patience Tawanda, DO        0.9 % sodium chloride infusion   IntraVENous PRN Patience Tawanda, DO        potassium chloride (KLOR-CON M) extended release tablet 40 mEq  40 mEq Oral PRN Patience Tawanda, DO   40 mEq at 05/29/22 0315    Or    potassium bicarb-citric acid (EFFER-K) effervescent tablet 40 mEq  40 mEq Oral PRN Patience Tawanda, DO        Or    potassium chloride 10 mEq/100 mL IVPB (Peripheral Line)  10 mEq IntraVENous PRN Patience Tawanda, DO        magnesium sulfate 1000 mg in dextrose 5% 100 mL IVPB  1,000 mg IntraVENous PRN Patience Tawanda, DO        ondansetron (ZOFRAN-ODT) disintegrating tablet 4 mg  4 mg Oral Q8H PRN Patience Tawanda, DO        Or    ondansetron (ZOFRAN) injection 4 mg  4 mg IntraVENous Q6H PRN Patience Tawanda, DO        polyethylene glycol (GLYCOLAX) packet 17 g  17 g Oral Daily PRN Patience Tawanda, DO        acetaminophen (TYLENOL) tablet 650 mg  650 mg Oral Q6H PRN Patience Tawanda, DO        Or    acetaminophen (TYLENOL) suppository 650 mg  650 mg Rectal Q6H PRN Patience Tawanda, DO        potassium chloride (KLOR-CON M) extended release tablet 40 mEq  40 mEq Oral Once Patience Tawanda, DO        dextrose bolus 10% 125 mL  125 mL IntraVENous PRN Osmar Plain, MD        Or    dextrose bolus 10% 250 mL  250 mL IntraVENous PRN Schuyler Watt MD   Stopped at 05/29/22 1328       Past Medical History:  Past Medical History:   Diagnosis Date    Acute liver failure without hepatic coma 10/23/2018    Back pain     \"with tired legs as a result\"    Bladder cancer ([facility] 12/19/2018    Blood circulation, collateral     Carotid arterial disease (HCC)     recent surgery    CKD (chronic kidney disease), 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/2018    Constipation     COPD with acute lower respiratory infection ([facility] 02/24/2021    Epigastric discomfort     GERD (gastroesophageal reflux disease)     Hyperlipidemia     Hypertension     Hypertension     Pacemaker     Palliative care patient 10/23/2018    Pneumonia due to infectious organism 11/06/2018    Primary osteoarthritis of left knee 10/14/2018    PVD (peripheral vascular disease) (HCC)     Tremor     Tremor on Right side x 1-2 weeks per stepdaughter    Type 2 diabetes mellitus with complication, without long-term current use of insulin ([facility] 01/21/2021       Past Surgical History:  Past Surgical History:   Procedure Laterality Date    BACK SURGERY      CARDIAC CATHETERIZATION  03/23/2021    100% RCA    COLONOSCOPY  2007?     CYSTOSCOPY Bilateral 12/19/2018    CYSTOSCOPY, BIOSPY FULGURATION OF BLADDER TUMOR POSSIBLE TURBT, RETROGRADE PYELOGRAM performed by Amy Amaro MD at [facility] [place] [facility] COLONOSCOPY FLX DX W/COLLJ SPEC WHEN PFRMD N/A 09/11/2017    Dr Cuba Backelizabeth internal hemorrhoids, diverticular disease-HP-No recall (age)   [facility] REVISE MEDIAN N/CARPAL TUNNEL SURG Left 07/18/2018    OPEN CARPAL TUNNEL RELEASE performed by Dyan Ford MD at [facility] INCIS Left 08/28/2018    LEFT CAROTID ENDARTERECTOMY WITH VEIN PATCH ANGIOPLASTY AND COMPLETION ANGIOGRAM performed by Tanisha Pena MD at [place] [address] Left 10/15/2018    LEFT COMPLEX TOTAL KNEE ARTHROPLASTY performed by Gaby Mendoza MD at [facility] ENDOSCOPY N/A 11/18/2021    Dr Lory Duane, Sub prep lg amount of solid and semisolid food/Medication in stomach body/antrum/pylorus, stomach lumen appeared to distended w air insufflation and collapse upon suction,   inadequate exam sugg of gastroparesis, repeat in 10-14 days    UPPER GASTROINTESTINAL ENDOSCOPY N/A 01/06/2022    Dr Lory Duane, distal stomach body likely GIST nodule-Submucosal fundic body gastric mucosa, Benign, (-)Hpylori, (-)Sprue    UPPER GASTROINTESTINAL ENDOSCOPY N/A 03/07/2022    Dr Lory Duane, EUS submucosal gastric nodule, otherwise normal, 1 year recall    UPPER GASTROINTESTINAL ENDOSCOPY N/A 03/07/2022    Dr Lory Duane, W EUS,  nodular area at end of rugal gold in distal stomach body    VASCULAR SURGERY  04/21/2015    Colleen LIU. Ultrasound guided access of left common femoral artery. Aortogram.Diagnostic right lower extremity arteriogram.Radiologic supervision and interpretation.  VASCULAR SURGERY  01/13/2015    Colleen Benjamin M.D Atherectomy,angioplasty,and stenting of left superficial femoral artery.  VASCULAR SURGERY  03/11/2014    Colleen Benjamin M.D. Ultrasound-guided access of right common femoral artery. Aortogram.Left lower extremity arteriogram.Atherectomy and angioplasty of left superficial femoral artery. Radiologic supervision and interpretation.  VASCULAR SURGERY  01/18/2013    Colleen BESS Aortogram.Multistation arteriogram right lower extremity. Laser atherectomy and angioplasty of right superficial femoral artery. Selective catheterization of right tibioperoneal trunk. Angioplasty of peroneal artery and tibioperoneal trunk.  VASCULAR SURGERY  10/30/2018    [facility]. Ultrasound guided cannulation of right internal vein. Placement of right internal jugular vein tunneled dialysis catheter bard equistream xk 23cm tip to cuff    VASCULAR SURGERY 2018    [facility]. Removal of tunneled dilaysis catheter right internal jugular vein. Family History  Family History   Problem Relation Age of Onset    Colon Cancer Father     Diabetes Brother     Colon Polyps Neg Hx     Liver Cancer Neg Hx     Liver Disease Neg Hx     Esophageal Cancer Neg Hx     Rectal Cancer Neg Hx     Stomach Cancer Neg Hx        Social History  Social History     Socioeconomic History    Marital status:      Spouse name: Not on file    Number of children: Not on file    Years of education: Not on file    Highest education level: Not on file   Occupational History    Not on file   Tobacco Use    Smoking status: Former Smoker     Packs/day: 2.00     Years: 48.00     Pack years: 96.00     Types: Cigarettes     Quit date: 6/3/2003     Years since quittin.0    Smokeless tobacco: Never Used   Vaping Use    Vaping Use: Never used   Substance and Sexual Activity    Alcohol use: Yes     Alcohol/week: 12.0 standard drinks     Types: 12 Glasses of wine per week     Comment: 1 glasses of wine every night    Drug use: No    Sexual activity: Yes     Partners: Female   Other Topics Concern    Not on file   Social History Narrative    Not on file     Social Determinants of Health     Financial Resource Strain:     Difficulty of Paying Living Expenses: Not on file   Food Insecurity:     Worried About Running Out of Food in the Last Year: Not on file    Ankit of Food in the Last Year: Not on file   Transportation Needs:     Lack of Transportation (Medical): Not on file    Lack of Transportation (Non-Medical):  Not on file   Physical Activity:     Days of Exercise per Week: Not on file    Minutes of Exercise per Session: Not on file   Stress:     Feeling of Stress : Not on file   Social Connections:     Frequency of Communication with Friends and Family: Not on file    Frequency of Social Gatherings with Friends and Family: Not on file    Attends [employer] Services: Not on file    Active Member of Clubs or Organizations: Not on file    Attends Club or Organization Meetings: Not on file    Marital Status: Not on file   Intimate Partner Violence:     Fear of Current or Ex-Partner: Not on file    Emotionally Abused: Not on file    Physically Abused: Not on file    Sexually Abused: Not on file   Housing Stability:     Unable to Pay for Housing in the Last Year: Not on file    Number of Places Lived in the Last Year: Not on file    Unstable Housing in the Last Year: Not on file         Review of Systems:  History obtained from chart review and the patient  General ROS: No fever or chills  Respiratory ROS: No cough, shortness of breath, wheezing  Cardiovascular ROS: No chest pain or palpitations  Gastrointestinal ROS: No abdominal pain or melena  Genito-Urinary ROS: No dysuria or hematuria  Musculoskeletal ROS: No joint pain or swelling         Objective:  Patient Vitals for the past 24 hrs:   BP Temp Temp src Pulse Resp SpO2 Weight   05/30/22 0625 (!) 158/63 97.2 °F (36.2 °C) -- 74 24 96 % --   05/30/22 0621 -- -- -- -- -- -- 212 lb 12.8 oz (96.5 kg)   05/30/22 0133 (!) 132/57 97.9 °F (36.6 °C) Temporal 59 18 96 % --   05/29/22 2147 -- -- -- 61 -- -- --   05/29/22 1637 134/62 97.2 °F (36.2 °C) -- 60 18 98 % --   05/29/22 1230 (!) 123/54 97.4 °F (36.3 °C) -- 60 20 97 % --       Intake/Output Summary (Last 24 hours) at 5/30/2022 1034  Last data filed at 5/30/2022 0953  Gross per 24 hour   Intake 210 ml   Output 1300 ml   Net -1090 ml     General: awake/alert   HEENT: Normocephalic atraumatic head  Neck: Supple with no JVD or carotid bruits.   Chest:  clear to auscultation bilaterally  CVS: regular rate and rhythm  Abdominal: soft, nontender, normal bowel sounds  Extremities: no cyanosis or edema  Skin: warm and dry without rash    Labs:  BMP:   Recent Labs     05/28/22  2109 05/30/22  0722    135*   K 3.3* 3.6    97*   CO2 28 27   BUN 64* 55* CREATININE 3.4* 3.0*   CALCIUM 9.2 8.6*     CBC:   Recent Labs     05/28/22 2109 05/30/22 0722   WBC 11.5* 7.3   HGB 12.0* 10.9*   HCT 38.2* 35.0*   MCV 92.0 92.8    153     LIVER PROFILE:   Recent Labs     05/28/22 2109 05/30/22 0722   AST 52* 23   ALT 48* 36   BILITOT 0.4 0.3   ALKPHOS 134* 97     PT/INR:   Recent Labs     05/30/22 0722   PROTIME 14.5   INR 1.13     APTT:   Recent Labs     05/30/22 0722   APTT 30.3     BNP:  No results for input(s): BNP in the last 72 hours. Ionized Calcium:No results for input(s): IONCA in the last 72 hours. Magnesium:  Recent Labs     05/28/22 2109   MG 2.0     Phosphorus:No results for input(s): PHOS in the last 72 hours. HgbA1C: No results for input(s): LABA1C in the last 72 hours. Hepatic:   Recent Labs     05/28/22 2109 05/30/22 0722   ALKPHOS 134* 97   ALT 48* 36   AST 52* 23   PROT 6.9 5.4*   BILITOT 0.4 0.3   LABALBU 3.9 3.5     Lactic Acid:   Recent Labs     05/30/22 0722   LACTA 0.8     Troponin: No results for input(s): CKTOTAL, CKMB, TROPONINT in the last 72 hours. ABGs:   Lab Results   Component Value Date    PHART 7.310 05/25/2022    PO2ART 57.0 05/25/2022    JXL8RWE 49.0 05/25/2022     CRP:  No results for input(s): CRP in the last 72 hours. Sed Rate:  No results for input(s): SEDRATE in the last 72 hours. Culture Results:   Blood Culture Recent:   Recent Labs     05/29/22  0305   BC   Bottle volume = 6 ml*  Isolated from Aerobic and Anaerobic bottle  No further workup  This organism was isolated from one set. Susceptibility testing is not routinely done as this  organism frequently represents skin contamination. Additional testing can be ordered by calling the  Microbiology Department.          Urine Culture Recent :   Recent Labs     05/20/22  0858   LABURIN >100,000 CFU/ml*  Heavy growth       Radiology reports as per the Radiologist  Radiology: ECHO Complete 2D W Doppler W Color    Result Date: 5/25/2022  Transthoracic Echocardiography Report (TTE)  Demographics   Patient Name  Carolyne Ritter Date of Study          05/25/2022   MRN           [id number]             Gender                 Male   Date of Birth 1940         Room Number            [facility]HL-0717   Age           [de-identified] year(s)   Height:       72 inches          Referring Physician    Magui Harper MD   Weight:       222 pounds         Sonographer   BSA:          2.23 m^2           Interpreting Physician Magui Harper MD   BMI:          30.11 kg/m^2  Procedure Type of Study   TTE procedure:ECHO NO CONTRAST WITH DOP/COLR. Study Location: Echo Lab Technical Quality: Adequate visualization Patient Status: Inpatient Rhythm: Within normal limits HR: 63 bpm BP: 134/59 mmHg Indications:Congestive heart failure. Conclusions   Summary  Mitral valve leaflets are mildly thickened with preserved leaflet  mobility. Mild mitral regurgitation is present. Mild aortic stenosis. Mean gradient 8 mm hg  Tricuspid valve is structurally normal.  Left ventricular ejection fraction is visually estimated at 50-55%. Normal left ventricular wall thickness. No regional wall motion abnormalities. Signature   ----------------------------------------------------------------  Electronically signed by Magui Harper MD(Interpreting  physician) on 05/25/2022 04:53 PM  ----------------------------------------------------------------   Findings   Mitral Valve  Mitral valve leaflets are mildly thickened with preserved leaflet  mobility. Mild mitral regurgitation is present. Aortic Valve  Mild aortic stenosis. Mean gradient 8 mm hg   Tricuspid Valve  Tricuspid valve is structurally normal.   Pulmonic Valve  The pulmonic valve was not well visualized . Left Atrium  Normal size left atrium. Left Ventricle  Left ventricular ejection fraction is visually estimated at 50-55%. Normal left ventricular wall thickness. No regional wall motion abnormalities.    Right Atrium  Normal right atrial dimension with no evidence of thrombus or mass noted. Right Ventricle  Normal right ventricular size with preserved RV function. Pericardial Effusion  No evidence of significant pericardial effusion is noted. Pleural Effusion  No evidence of pleural effusion. Allergies   - Other allergy:(Eliquis). - Phenergan. 2D Measurements and Calculations(cm)   LVIDd: 5.38 cm                      LVIDs: 3.94 cm  IVSd: 1.23 cm  LVPWd: 0.98 cm                      AO Root Dimension: 2.4 cm  Rt. Vent. Dimension: 2.41 cm        LA Dimension: 4.3 cm  % Ejection Fraction: 55 %           LA Area: 18.3 cm^2  LA Volume: 43.7 ml                  LV Systolic dimension: 0.03DE  LA Volume Index: 20 ml/m^2          LV PW diastolic: 8.87WH  LV dimension: 5.38 cm               LVOT diameter: 2 cm                                      RA Systolic pressure: 3mmHg                                      RV Diastolic dimension: 4.75VD  Cardic Output (CO): 5.46l/min  Doppler Measurements and Calculations   AV Peak Velocity:196 cm/s              MV Peak E-Wave: 125 cm/s  AV Mean Velocity:135 cm/s              MV Peak A-Wave: 74.1 cm/s  AV Peak Gradient: 15.37 mmHg           MV E/A Ratio: 1.69 %  AV Mean Gradient: 8 mmHg               MV Mean velocity: 71.8cm/s  AV Area (Continuity):1.92 cm^2         MV Peak Gradient: 6.25 mmHg  AV VTI:45.1 cm/s                       MV Mean gradient: 2 mmHg                                         MV P1/2t: 96 msec  Estimated RAP:3 mmHg                   MVA by PHT2.29 cm^2   MV E' septal velocity: 7.4cm/s  MV E' lateral velocity:9.25 cm/s      XR CHEST PORTABLE    Result Date: 5/24/2022  XR CHEST PORTABLE 5/24/2022 5:11 PM HISTORY: Shortness of breath  Technique: Single AP view of the chest COMPARISONS: Chest exam dated 11/23/2021 FINDINGS: Mild cardiomegaly. Central pulmonary vascular congestion.  Bilateral interstitial coarsening and patchy perihilar and infrahilar opacities, appearance favoring a developing pulmonary edema. No dense consolidation. Elevation of the right hemidiaphragm which is likely chronic. No pleural effusion or pneumothorax. Left chest wall dual chamber pacemaker appears stable. No acute bony abnormality. 1. Appearance favors interstitial edema with developing perihilar and infrahilar alveolar edema. No consolidation or obvious pleural effusion. Signed by Dr Yael Patrick   1. Acute kidney injury/improving. 2.  Cardiorenal syndrome. 3.  Stage IV chronic kidney disease baseline. 4.  Type II diabetic nephropathy. 5.  Chronic diastolic CHF. 6.  Anemia of chronic kidney disease. Plan:  1. Continue slow hydration. 2.  Monitor renal function closely. 3.  Plan was discussed with the patient.     Clifford Rayo MD  05/30/22  10:34 AM

## 2022-05-30 NOTE — PROGRESS NOTES
[facility] Pharmacokinetic Monitoring Service - Vancomycin     Kerin Duane is a [de-identified] y.o. male starting on vancomycin therapy for bloodstream infection. Pharmacy consulted by Dr. Nuzhat Wang for monitoring and adjustment. Target Concentration: Dosing based on anticipated concentration <15 mg/L due to renal impairment/insufficiency    Additional Antimicrobials: none at this time    Pertinent Laboratory Values: Wt Readings from Last 1 Encounters:   05/29/22 216 lb 6.4 oz (98.2 kg)     Temp Readings from Last 1 Encounters:   05/29/22 97.2 °F (36.2 °C)     Estimated Creatinine Clearance: 21 mL/min (A) (based on SCr of 3.4 mg/dL (H)). Recent Labs     05/28/22 2109   CREATININE 3.4*   WBC 11.5*     Pertinent Cultures:  Culture Date Source Results   05/29/22 Blood  Gram positive cocci in clusters resembling Staphylococcus    05/29/22 Blood  Sent    MRSA Nasal Swab: N/A. Non-respiratory infection. Plan:  Concentration-guided dosing due to renal impairment/insufficiency  Start vancomycin load dose of 2500 mg IV x 1 dose today. Renal labs as indicated   Vancomycin concentration ordered for 05/31 @ 0400 with AM labs.    Pharmacy will continue to monitor patient and adjust therapy as indicated    Thank you for the consult,  Nguyễn Siddiqi, [facility]  5/30/2022 12:22 AM

## 2022-05-31 VITALS
RESPIRATION RATE: 17 BRPM | WEIGHT: 212.8 LBS | DIASTOLIC BLOOD PRESSURE: 57 MMHG | TEMPERATURE: 97 F | HEART RATE: 62 BPM | HEIGHT: 72 IN | SYSTOLIC BLOOD PRESSURE: 139 MMHG | BODY MASS INDEX: 28.82 KG/M2 | OXYGEN SATURATION: 98 %

## 2022-05-31 LAB
ALBUMIN SERPL-MCNC: 3.5 G/DL (ref 3.5–5.2)
ALP BLD-CCNC: 99 U/L (ref 40–130)
ALT SERPL-CCNC: 32 U/L (ref 5–41)
ANION GAP SERPL CALCULATED.3IONS-SCNC: 13 MMOL/L (ref 7–19)
AST SERPL-CCNC: 18 U/L (ref 5–40)
BILIRUB SERPL-MCNC: 0.4 MG/DL (ref 0.2–1.2)
BUN BLDV-MCNC: 52 MG/DL (ref 8–23)
CALCIUM SERPL-MCNC: 8.6 MG/DL (ref 8.8–10.2)
CHLORIDE BLD-SCNC: 101 MMOL/L (ref 98–111)
CO2: 24 MMOL/L (ref 22–29)
CREAT SERPL-MCNC: 2.9 MG/DL (ref 0.5–1.2)
GFR AFRICAN AMERICAN: 25
GFR NON-AFRICAN AMERICAN: 21
GLUCOSE BLD-MCNC: 168 MG/DL (ref 70–99)
GLUCOSE BLD-MCNC: 173 MG/DL (ref 74–109)
HCT VFR BLD CALC: 33.1 % (ref 42–52)
HEMOGLOBIN: 10.3 G/DL (ref 14–18)
MCH RBC QN AUTO: 28.7 PG (ref 27–31)
MCHC RBC AUTO-ENTMCNC: 31.1 G/DL (ref 33–37)
MCV RBC AUTO: 92.2 FL (ref 80–94)
PDW BLD-RTO: 13.2 % (ref 11.5–14.5)
PERFORMED ON: ABNORMAL
PLATELET # BLD: 158 K/UL (ref 130–400)
PMV BLD AUTO: 10.1 FL (ref 9.4–12.4)
POTASSIUM SERPL-SCNC: 3.9 MMOL/L (ref 3.5–5)
RBC # BLD: 3.59 M/UL (ref 4.7–6.1)
SODIUM BLD-SCNC: 138 MMOL/L (ref 136–145)
TOTAL PROTEIN: 5.4 G/DL (ref 6.6–8.7)
WBC # BLD: 7.5 K/UL (ref 4.8–10.8)

## 2022-05-31 PROCEDURE — 97165 OT EVAL LOW COMPLEX 30 MIN: CPT

## 2022-05-31 PROCEDURE — 6370000000 HC RX 637 (ALT 250 FOR IP): Performed by: HOSPITALIST

## 2022-05-31 PROCEDURE — 97116 GAIT TRAINING THERAPY: CPT

## 2022-05-31 PROCEDURE — 2580000003 HC RX 258: Performed by: INTERNAL MEDICINE

## 2022-05-31 PROCEDURE — 36415 COLL VENOUS BLD VENIPUNCTURE: CPT

## 2022-05-31 PROCEDURE — 97530 THERAPEUTIC ACTIVITIES: CPT

## 2022-05-31 PROCEDURE — 97161 PT EVAL LOW COMPLEX 20 MIN: CPT

## 2022-05-31 PROCEDURE — 82947 ASSAY GLUCOSE BLOOD QUANT: CPT

## 2022-05-31 PROCEDURE — 85027 COMPLETE CBC AUTOMATED: CPT

## 2022-05-31 PROCEDURE — 80053 COMPREHEN METABOLIC PANEL: CPT

## 2022-05-31 RX ADMIN — METOPROLOL SUCCINATE 100 MG: 50 TABLET, EXTENDED RELEASE ORAL at 07:46

## 2022-05-31 RX ADMIN — TAMSULOSIN HYDROCHLORIDE 0.4 MG: 0.4 CAPSULE ORAL at 07:46

## 2022-05-31 RX ADMIN — PANTOPRAZOLE SODIUM 40 MG: 40 TABLET, DELAYED RELEASE ORAL at 06:03

## 2022-05-31 RX ADMIN — ASPIRIN 81 MG: 81 TABLET, COATED ORAL at 07:46

## 2022-05-31 RX ADMIN — NIFEDIPINE 60 MG: 60 TABLET, EXTENDED RELEASE ORAL at 07:46

## 2022-05-31 RX ADMIN — AMIODARONE HYDROCHLORIDE 200 MG: 200 TABLET ORAL at 07:46

## 2022-05-31 RX ADMIN — SODIUM CHLORIDE, PRESERVATIVE FREE 10 ML: 5 INJECTION INTRAVENOUS at 07:46

## 2022-05-31 NOTE — CONSULTS
Palliative care consult visit. Pt is known to team.  Recent adm 5/24-5/26/22 d/t acure on chronic CHG. He returns to our facility on 5/28 with AMS d/t hypoglycemia. Pt is awake, alert and oriented. Discharged this morning and waiting for his spouse to come and get him. He denies pain at present time. States he feels much better today. VSS. Pt tells me he was offered [facility] but does not feel he needs it. States he is able to manage his daily life with some assist from his spouse. Pt would like to update phone number for his dtr(contact), but does not have it with him. This RN left PC contact information with pt. Explained he could and provide number and we will get it changed. No other needs or concerns voiced at this time.     Electronically signed by Zechariah Marx RN on 5/31/2022 at 11:09 AM

## 2022-05-31 NOTE — PROGRESS NOTES
Nephrology ([facility]) Progress Note    Patient:  Yonatan Lawrence  YOB: 1940  Date of Service: 5/31/2022  MRN: [id number]   Acct: [de-identified]   Primary Care Physician: Jackie Ramsey MD  Advance Directive: Full Code  Admit Date: 5/28/2022       Hospital Day: 2  Referring Provider: Marie Wong MD    Patient independently seen and examined, Chart, Consults, Notes, Operative notes, Labs, Cardiology, and Radiology studies reviewed as available. Chief complaint: Abnormal labs. Subjective:  Yonatan Lawrence is a [de-identified] y.o. male for whom we were consulted for evaluation and treatment of acute kidney injury/chronic kidney disease. He has history of stage IV chronic kidney disease, follows Bedford Fleischer, APRN in the office. Presented to the emergency room with severe hypoglycemia. He was treated with D10. Incidental finding on the lab consistent with worsening of renal function and nephrology was consulted. He is currently receiving IV fluid and has slow improvement of renal function. Patient also has history of type 2 diabetes, hypertension, chronic kidney disease and chronic obstructive pulm disease. This morning patient feels very well. He denies any shortness of breath his renal function is better and hoping to go home today.     Allergies:  Eliquis [apixaban] and Promethazine hcl    Medicines:  Current Facility-Administered Medications   Medication Dose Route Frequency Provider Last Rate Last Admin    amiodarone (CORDARONE) tablet 200 mg  200 mg Oral Daily Marie Wong MD   200 mg at 05/31/22 0746    tamsulosin (FLOMAX) capsule 0.4 mg  0.4 mg Oral Daily Marie Wong MD   0.4 mg at 05/31/22 0746    pantoprazole (PROTONIX) tablet 40 mg  40 mg Oral QAM AC Marie Wong MD   40 mg at 05/31/22 0603    aspirin EC tablet 81 mg  81 mg Oral Daily Marie Wong MD   81 mg at 05/31/22 0746    atorvastatin (LIPITOR) tablet 20 mg  20 mg Oral Nightly Stoney Hogue MD   20 mg at 05/30/22 1934    NIFEdipine (PROCARDIA XL) extended release tablet 60 mg  60 mg Oral Daily Stoney Hogue MD   60 mg at 05/31/22 0746    metoprolol succinate (TOPROL XL) extended release tablet 100 mg  100 mg Oral Daily Stoney Hogue MD   100 mg at 05/31/22 0746    0.9 % sodium chloride infusion   IntraVENous Continuous Erikfran Byrdard, APRN - CNP   Stopped at 05/31/22 0940    sodium chloride flush 0.9 % injection 10 mL  10 mL IntraVENous 2 times per day Patience Tawanda, DO   10 mL at 05/31/22 0746    sodium chloride flush 0.9 % injection 10 mL  10 mL IntraVENous PRN Patience Tawanda, DO        0.9 % sodium chloride infusion   IntraVENous PRN Patience Tawanda, DO        potassium chloride (KLOR-CON M) extended release tablet 40 mEq  40 mEq Oral PRN Patience Tawanda, DO   40 mEq at 05/29/22 0315    Or    potassium bicarb-citric acid (EFFER-K) effervescent tablet 40 mEq  40 mEq Oral PRN Patience Tawanda, DO        Or    potassium chloride 10 mEq/100 mL IVPB (Peripheral Line)  10 mEq IntraVENous PRN Patience Tawanda, DO        magnesium sulfate 1000 mg in dextrose 5% 100 mL IVPB  1,000 mg IntraVENous PRN Patience Tawanda, DO        ondansetron (ZOFRAN-ODT) disintegrating tablet 4 mg  4 mg Oral Q8H PRN Patience Tawanda, DO        Or    ondansetron (ZOFRAN) injection 4 mg  4 mg IntraVENous Q6H PRN Patience Tawanda, DO        polyethylene glycol (GLYCOLAX) packet 17 g  17 g Oral Daily PRN Patience Tawanda, DO        acetaminophen (TYLENOL) tablet 650 mg  650 mg Oral Q6H PRN Patience Tawanda, DO        Or    acetaminophen (TYLENOL) suppository 650 mg  650 mg Rectal Q6H PRN Patience Tawanda, DO        potassium chloride (KLOR-CON M) extended release tablet 40 mEq  40 mEq Oral Once Patience Tawanda, DO        dextrose bolus 10% 125 mL  125 mL IntraVENous PRN Stoney Hogue MD        Or    dextrose bolus 10% 250 mL  250 mL IntraVENous PRN Stoney Hogue MD   Stopped at 05/29/22 18       Past Medical History:  Past Medical History:   Diagnosis Date    Acute liver failure without hepatic coma 10/23/2018    Back pain     \"with tired legs as a result\"    Bladder cancer ([facility] 12/19/2018    Blood circulation, collateral     Carotid arterial disease (HCC)     recent surgery    CKD (chronic kidney disease), 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/2018    Constipation     COPD with acute lower respiratory infection ([facility] [address] 02/24/2021    Epigastric discomfort     GERD (gastroesophageal reflux disease)     Hyperlipidemia     Hypertension     Hypertension     Pacemaker     Palliative care patient 10/23/2018    Pneumonia due to infectious organism 11/06/2018    Primary osteoarthritis of left knee 10/14/2018    PVD (peripheral vascular disease) (HCC)     Tremor     Tremor on Right side x 1-2 weeks per stepdaughter    Type 2 diabetes mellitus with complication, without long-term current use of insulin ([facility] [address] 01/21/2021       Past Surgical History:  Past Surgical History:   Procedure Laterality Date    BACK SURGERY      CARDIAC CATHETERIZATION  03/23/2021    100% RCA    COLONOSCOPY  2007?     CYSTOSCOPY Bilateral 12/19/2018    CYSTOSCOPY, BIOSPY FULGURATION OF BLADDER TUMOR POSSIBLE TURBT, RETROGRADE PYELOGRAM performed by Humphrey Cruz MD at [facility] COLONOSCOPY FLX DX W/COLLJ SPEC WHEN PFRMD N/A 09/11/2017    Dr Mann Sis internal hemorrhoids, diverticular disease-HP-No recall (age)   [place] REVISE MEDIAN N/CARPAL TUNNEL SURG Left 07/18/2018    OPEN CARPAL TUNNEL RELEASE performed by Daniel Cerda MD at [address] Left 08/28/2018    LEFT CAROTID ENDARTERECTOMY WITH VEIN PATCH ANGIOPLASTY AND COMPLETION ANGIOGRAM performed by Rocael Browning MD at [address] 10/15/2018    LEFT COMPLEX TOTAL KNEE ARTHROPLASTY performed by Daniel Cerda MD at [facility] N/A 11/18/2021    Dr Lory Duane, Sub prep lg amount of solid and semisolid food/Medication in stomach body/antrum/pylorus, stomach lumen appeared to distended w air insufflation and collapse upon suction,   inadequate exam sugg of gastroparesis, repeat in 10-14 days    UPPER GASTROINTESTINAL ENDOSCOPY N/A 01/06/2022    Dr Lory Duane, distal stomach body likely GIST nodule-Submucosal fundic body gastric mucosa, Benign, (-)Hpylori, (-)Sprue    UPPER GASTROINTESTINAL ENDOSCOPY N/A 03/07/2022    Dr Lory Duane, EUS submucosal gastric nodule, otherwise normal, 1 year recall    UPPER GASTROINTESTINAL ENDOSCOPY N/A 03/07/2022    Dr Lory Duane, W EUS,  nodular area at end of rugal gold in distal stomach body    VASCULAR SURGERY  04/21/2015    Colleen BESS Ultrasound guided access of left common femoral artery. Aortogram.Diagnostic right lower extremity arteriogram.Radiologic supervision and interpretation.  VASCULAR SURGERY  01/13/2015    Colleen Benjamin M.D Atherectomy,angioplasty,and stenting of left superficial femoral artery.  VASCULAR SURGERY  03/11/2014    Colleen Benjamin M.D. Ultrasound-guided access of right common femoral artery. Aortogram.Left lower extremity arteriogram.Atherectomy and angioplasty of left superficial femoral artery. Radiologic supervision and interpretation.  VASCULAR SURGERY  01/18/2013    Colleen BESS Aortogram.Multistation arteriogram right lower extremity. Laser atherectomy and angioplasty of right superficial femoral artery. Selective catheterization of right tibioperoneal trunk. Angioplasty of peroneal artery and tibioperoneal trunk.  VASCULAR SURGERY  10/30/2018    [facility]. Ultrasound guided cannulation of right internal vein. Placement of right internal jugular vein tunneled dialysis catheter bard equistream xk 23cm tip to cuff    VASCULAR SURGERY  12/17/2018    [facility]. Removal of tunneled dilaysis catheter right internal jugular vein.        Family History  Family History   Problem Relation Age of Onset    Colon Cancer Father     Diabetes Brother     Colon Polyps Neg Hx     Liver Cancer Neg Hx     Liver Disease Neg Hx     Esophageal Cancer Neg Hx     Rectal Cancer Neg Hx     Stomach Cancer Neg Hx        Social History  Social History     Socioeconomic History    Marital status:      Spouse name: Not on file    Number of children: Not on file    Years of education: Not on file    Highest education level: Not on file   Occupational History    Not on file   Tobacco Use    Smoking status: Former Smoker     Packs/day: 2.00     Years: 48.00     Pack years: 96.00     Types: Cigarettes     Quit date: 6/3/2003     Years since quittin.0    Smokeless tobacco: Never Used   Vaping Use    Vaping Use: Never used   Substance and Sexual Activity    Alcohol use: Yes     Alcohol/week: 12.0 standard drinks     Types: 12 Glasses of wine per week     Comment: 1 glasses of wine every night    Drug use: No    Sexual activity: Yes     Partners: Female   Other Topics Concern    Not on file   Social History Narrative    Not on file     Social Determinants of Health     Financial Resource Strain:     Difficulty of Paying Living Expenses: Not on file   Food Insecurity:     Worried About Running Out of Food in the Last Year: Not on file    Ankit of Food in the Last Year: Not on file   Transportation Needs:     Lack of Transportation (Medical): Not on file    Lack of Transportation (Non-Medical):  Not on file   Physical Activity:     Days of Exercise per Week: Not on file    Minutes of Exercise per Session: Not on file   Stress:     Feeling of Stress : Not on file   Social Connections:     Frequency of Communication with Friends and Family: Not on file    Frequency of Social Gatherings with Friends and Family: Not on file    Attends Temple Services: Not on file    Active Member of Clubs or Organizations: Not on file    Attends Club or Organization Meetings: Not on file    Marital Status: Not on file   Intimate Partner Violence:     Fear of Current or Ex-Partner: Not on file    Emotionally Abused: Not on file    Physically Abused: Not on file    Sexually Abused: Not on file   Housing Stability:     Unable to Pay for Housing in the Last Year: Not on file    Number of Places Lived in the Last Year: Not on file    Unstable Housing in the Last Year: Not on file         Review of Systems:  History obtained from chart review and the patient  General ROS: No fever or chills  Respiratory ROS: No cough, shortness of breath, wheezing  Cardiovascular ROS: No chest pain or palpitations  Gastrointestinal ROS: No abdominal pain or melena  Genito-Urinary ROS: No dysuria or hematuria  Musculoskeletal ROS: No joint pain or swelling         Objective:  Patient Vitals for the past 24 hrs:   BP Temp Temp src Pulse Resp SpO2   05/31/22 0641 (!) 139/57 97 °F (36.1 °C) -- 62 17 98 %   05/31/22 0015 (!) 125/57 98.6 °F (37 °C) -- 60 17 92 %   05/30/22 1930 -- -- -- 65 -- --   05/30/22 1713 (!) 133/56 97 °F (36.1 °C) Temporal 60 16 98 %   05/30/22 1122 (!) 138/59 96.8 °F (36 °C) Temporal 59 16 96 %       Intake/Output Summary (Last 24 hours) at 5/31/2022 0958  Last data filed at 5/31/2022 0645  Gross per 24 hour   Intake 10 ml   Output 550 ml   Net -540 ml     General: awake/alert   HEENT: Normocephalic atraumatic head  Neck: Supple with no JVD or carotid bruits.   Chest:  clear to auscultation bilaterally  CVS: regular rate and rhythm  Abdominal: soft, nontender, normal bowel sounds  Extremities: no cyanosis or edema  Skin: warm and dry without rash    Labs:  BMP:   Recent Labs     05/28/22 2109 05/30/22  0722 05/31/22  0230    135* 138   K 3.3* 3.6 3.9    97* 101   CO2 28 27 24   BUN 64* 55* 52*   CREATININE 3.4* 3.0* 2.9*   CALCIUM 9.2 8.6* 8.6*     CBC:   Recent Labs     05/28/22 2109 05/30/22  0722 05/31/22  0230   WBC 11.5* 7.3 7.5   HGB 12.0* 10.9* 10.3*   HCT 38.2* 35.0* 33.1*   MCV 92.0 92.8 92.2    153 158     LIVER PROFILE:   Recent Labs     05/28/22 2109 05/30/22 0722 05/31/22  0230   AST 52* 23 18   ALT 48* 36 32   BILITOT 0.4 0.3 0.4   ALKPHOS 134* 97 99     PT/INR:   Recent Labs     05/30/22 0722   PROTIME 14.5   INR 1.13     APTT:   Recent Labs     05/30/22 0722   APTT 30.3     BNP:  No results for input(s): BNP in the last 72 hours. Ionized Calcium:No results for input(s): IONCA in the last 72 hours. Magnesium:  Recent Labs     05/28/22 2109   MG 2.0     Phosphorus:No results for input(s): PHOS in the last 72 hours. HgbA1C:   Recent Labs     05/30/22 0722   LABA1C 5.7     Hepatic:   Recent Labs     05/28/22 2109 05/30/22 0722 05/31/22  0230   ALKPHOS 134* 97 99   ALT 48* 36 32   AST 52* 23 18   PROT 6.9 5.4* 5.4*   BILITOT 0.4 0.3 0.4   LABALBU 3.9 3.5 3.5     Lactic Acid:   Recent Labs     05/30/22 0722   LACTA 0.8     Troponin: No results for input(s): CKTOTAL, CKMB, TROPONINT in the last 72 hours. ABGs:   Lab Results   Component Value Date    PHART 7.310 05/25/2022    PO2ART 57.0 05/25/2022    PPB9UUD 49.0 05/25/2022     CRP:  No results for input(s): CRP in the last 72 hours. Sed Rate:  No results for input(s): SEDRATE in the last 72 hours. Culture Results:   Blood Culture Recent:   Recent Labs     05/30/22  0755   BC No Growth to date. Any change in status will be called.        Urine Culture Recent :   Recent Labs     05/20/22  0858   LABURIN >100,000 CFU/ml*  Heavy growth       Radiology reports as per the Radiologist  Radiology: ECHO Complete 2D W Doppler W Color    Result Date: 5/25/2022  Transthoracic Echocardiography Report (TTE)  Demographics   Patient Name  [facility] Date of Study          05/25/2022   MRN           [id number]             Gender                 Male   Date of Birth 1940         Room Number            [id number]   Age           [de-identified] year(s)   Height:       72 inches          Referring Physician    Katarina Mejias Rosalie Lora MD   Weight:       222 pounds         Sonographer   BSA:          2.23 m^2           Interpreting Physician Julianna Castro MD   BMI:          30.11 kg/m^2  Procedure Type of Study   TTE procedure:ECHO NO CONTRAST WITH DOP/COLR. Study Location: Echo Lab Technical Quality: Adequate visualization Patient Status: Inpatient Rhythm: Within normal limits HR: 63 bpm BP: 134/59 mmHg Indications:Congestive heart failure. Conclusions   Summary  Mitral valve leaflets are mildly thickened with preserved leaflet  mobility. Mild mitral regurgitation is present. Mild aortic stenosis. Mean gradient 8 mm hg  Tricuspid valve is structurally normal.  Left ventricular ejection fraction is visually estimated at 50-55%. Normal left ventricular wall thickness. No regional wall motion abnormalities. Signature   ----------------------------------------------------------------  Electronically signed by Julianna Castro MD(Interpreting  physician) on 05/25/2022 04:53 PM  ----------------------------------------------------------------   Findings   Mitral Valve  Mitral valve leaflets are mildly thickened with preserved leaflet  mobility. Mild mitral regurgitation is present. Aortic Valve  Mild aortic stenosis. Mean gradient 8 mm hg   Tricuspid Valve  Tricuspid valve is structurally normal.   Pulmonic Valve  The pulmonic valve was not well visualized . Left Atrium  Normal size left atrium. Left Ventricle  Left ventricular ejection fraction is visually estimated at 50-55%. Normal left ventricular wall thickness. No regional wall motion abnormalities. Right Atrium  Normal right atrial dimension with no evidence of thrombus or mass noted. Right Ventricle  Normal right ventricular size with preserved RV function. Pericardial Effusion  No evidence of significant pericardial effusion is noted. Pleural Effusion  No evidence of pleural effusion. Allergies   - Other allergy:(Eliquis). - Phenergan.  2D Measurements and Calculations(cm)   LVIDd: 5.38 cm                      LVIDs: 3.94 cm  IVSd: 1.23 cm  LVPWd: 0.98 cm                      AO Root Dimension: 2.4 cm  Rt. Vent. Dimension: 2.41 cm        LA Dimension: 4.3 cm  % Ejection Fraction: 55 %           LA Area: 18.3 cm^2  LA Volume: 43.7 ml                  LV Systolic dimension: 5.57GU  LA Volume Index: 20 ml/m^2          LV PW diastolic: 7.88DO  LV dimension: 5.38 cm               LVOT diameter: 2 cm                                      RA Systolic pressure: 3mmHg                                      RV Diastolic dimension: 9.35YU  Cardic Output (CO): 5.46l/min  Doppler Measurements and Calculations   AV Peak Velocity:196 cm/s              MV Peak E-Wave: 125 cm/s  AV Mean Velocity:135 cm/s              MV Peak A-Wave: 74.1 cm/s  AV Peak Gradient: 15.37 mmHg           MV E/A Ratio: 1.69 %  AV Mean Gradient: 8 mmHg               MV Mean velocity: 71.8cm/s  AV Area (Continuity):1.92 cm^2         MV Peak Gradient: 6.25 mmHg  AV VTI:45.1 cm/s                       MV Mean gradient: 2 mmHg                                         MV P1/2t: 96 msec  Estimated RAP:3 mmHg                   MVA by PHT2.29 cm^2   MV E' septal velocity: 7.4cm/s  MV E' lateral velocity:9.25 cm/s      XR CHEST PORTABLE    Result Date: 5/24/2022  XR CHEST PORTABLE 5/24/2022 5:11 PM HISTORY: Shortness of breath  Technique: Single AP view of the chest COMPARISONS: Chest exam dated 11/23/2021 FINDINGS: Mild cardiomegaly. Central pulmonary vascular congestion. Bilateral interstitial coarsening and patchy perihilar and infrahilar opacities, appearance favoring a developing pulmonary edema. No dense consolidation. Elevation of the right hemidiaphragm which is likely chronic. No pleural effusion or pneumothorax. Left chest wall dual chamber pacemaker appears stable. No acute bony abnormality. 1. Appearance favors interstitial edema with developing perihilar and infrahilar alveolar edema.  No consolidation or obvious pleural effusion. Signed by Dr Borsi Andrew   1. Acute kidney injury/improving. 2.  Cardiorenal syndrome. 3.  Stage IV chronic kidney disease baseline. 4.  Type II diabetic nephropathy. 5.  Chronic diastolic CHF. 6.  Anemia of chronic kidney disease. Plan:  1. Discontinue IV fluid  2. Possible discharge to home today. 3.  May go home today and follow-up needed in 2 weeks.     Arnaldo Durbin MD  05/31/22  9:58 AM

## 2022-05-31 NOTE — PROGRESS NOTES
[facility] PHYSICAL THERAP[facility] EVALUATION      Cal Vanegas    : 1940  MRN: [id number]   PHYSICIAN:  Tena Reynolds MD  Primary Problem    Patient Active Problem List   Diagnosis    Diverticulitis of large intestine with perforation without bleeding    Generalized abdominal pain    Colonic diverticular abscess    Bilateral carotid artery stenosis    Atherosclerosis of native artery of both lower extremities with intermittent claudication (HCC)    Carotid stenosis, asymptomatic, left    Primary osteoarthritis of left knee    Arthritis of knee    Essential hypertension    Pure hypercholesterolemia    Slow transit constipation    Iron deficiency anemia    GERD (gastroesophageal reflux disease)    Acute liver failure without hepatic coma    CHF (congestive heart failure) (HCC)    Bilateral pleural effusion    Palliative care patient    Shock liver    Acute renal failure (HCC)    Benign prostatic hyperplasia with lower urinary tract symptoms    Bleeding diathesis ([address]    Epistaxis    Acute blood loss anemia    Melena    Thrombocytopenia (HCC)    Hypocalcemia    Pneumonia due to infectious organism    Bladder cancer ([address]    Postoperative pain    History of bladder cancer    Severe sepsis (HCC)    Chronic respiratory failure with hypoxia and hypercapnia (HCC)    Acute on chronic kidney failure (HCC)    Hypoxemia    Metabolic acidosis    Acidosis, metabolic, with respiratory acidosis    Acute respiratory failure (HCC)    Type 2 diabetes mellitus with complication, without long-term current use of insulin (HCC)    Weakness    Other dysphagia    Chronic midline low back pain without sciatica    COPD with acute lower respiratory infection ([address]    Chronic kidney disease    Recurrent pneumonia    Nonsustained ventricular tachycardia (HCC)    Atrial fibrillation (HCC)    Vitamin D deficiency    Anemia    Alcohol use    Pacemaker    Class 1 obesity in adult    GREGORIO treated with BiPAP    Chronic diastolic congestive heart failure (HCC)    SOB (shortness of breath)    Hypoglycemia    Acute kidney injury superimposed on CKD (HCC)    Constipation    Acute decompensated heart failure (HCC)    Chronic obstructive pulmonary disease with (acute) exacerbation (HCC)    Long term current use of antiarrhythmic drug    Acute on chronic systolic (congestive) heart failure (HCC)    Back pain    Carotid arterial disease (HCC)    Epigastric discomfort    Hyperlipidemia    PVD (peripheral vascular disease) (HCC)    Tremor    Urge incontinence of urine    Oxygen dependent    Acute renal failure ([facility]F) ([facility]       Rehabilitation Diagnosis:     Acute renal failure (ARF) (HCC) [N17.9]  Hypoglycemia secondary to sulfonylurea, accidental or unintentional, initial encounter [T38.3X1A, E16.0]  Acute kidney injury superimposed on CKD ([facility] [address] [N17.9, N18.9]       SERVICE DATE: 5/31/2022        SUBJECTIVE: Patient agrees that they are safe with mobility and do not require physical therapy services. Plans for d/c home today with spouse. OBJECTIVE:    Orientation is Within normal limits           ACTIVE ROM:       All major lower extremity joints are within functional limits      STRENGTH     Both lower extremities are grossly within functional limits.     TRANSFERS   Sit to stand   SBA      Bed to chair   SBA    Bed to mobility   Supine to sit   Independent       Roll     Independent     Scoot Side to side / Up and down   Independent    AMBULATION   Distance: 25'     Device: RW     Assistance: SBA       Comment: slightly flexed posture, no LOB    BALANCE   Sitting    Good     Standing    Good (-)    Tx Initiated: transfers, ambulation, dynamic balance activities, education    ASSESSMENT      Independent and safe with all functional mobility       PLAN: Discharge from skilled physical therapy      GOALS   Independent and safe with all functional mobility (MET)            Electronically signed by Patsy Kelley, PT on 5/31/2022 at 9:32 AM

## 2022-05-31 NOTE — PROGRESS NOTES
Iv access difficult to obtain and maintain me and a fellow nurse on night attempted approx 7 times to gain access with no luck Electronically signed by Sheri Ewing RN on 5/31/2022 at 6:05 AM

## 2022-05-31 NOTE — PROGRESS NOTES
Occupational Therapy  Facility/Department: [facility] 3 DEVEN/VAS/MED  Occupational Therapy Initial Assessment    Name: Cal Vanegas  : 1940  MRN: [id number]  Date of Service: 2022    Discharge Recommendations:             Patient Diagnosis(es): The primary encounter diagnosis was Acute kidney injury superimposed on CKD ([address] A diagnosis of Hypoglycemia secondary to sulfonylurea, accidental or unintentional, initial encounter was also pertinent to this visit. Past Medical History:  has a past medical history of Acute liver failure without hepatic coma, Back pain, Bladder cancer ([address] Blood circulation, collateral, Carotid arterial disease ([address] CKD (chronic kidney disease), stage II, Constipation, COPD with acute lower respiratory infection ([address] Epigastric discomfort, GERD (gastroesophageal reflux disease), Hyperlipidemia, Hypertension, Hypertension, Pacemaker, Palliative care patient, Pneumonia due to infectious organism, Primary osteoarthritis of left knee, PVD (peripheral vascular disease) ([address] Tremor, and Type 2 diabetes mellitus with complication, without long-term current use of insulin ([address] Past Surgical History:  has a past surgical history that includes back surgery; Tonsillectomy and adenoidectomy; Colonoscopy (?); pr colonoscopy flx dx w/collj spec when pfrmd (N/A, 2017); pr revise median n/carpal tunnel surg (Left, 2018); pr thromboendartectmy neck,neck incis (Left, 2018); vascular surgery (2015); vascular surgery (2015); vascular surgery (2014); vascular surgery (2013); pr total knee arthroplasty (Left, 10/15/2018); vascular surgery (10/30/2018); vascular surgery (2018); Cystoscopy (Bilateral, 2018); Cardiac catheterization (2021); Upper gastrointestinal endoscopy (N/A, 2021); Upper gastrointestinal endoscopy (N/A, 2022);  Upper gastrointestinal endoscopy (N/A, 2022); and Upper gastrointestinal endoscopy (N/A, 03/07/2022). Treatment Diagnosis: Acute kidney injury      Assessment   Performance deficits / Impairments: Decreased functional mobility ; Decreased endurance;Decreased ADL status; Decreased balance  Assessment: Pt. did well with ADLs and tfers. No further OT required  Treatment Diagnosis: Acute kidney injury  Prognosis: Good  Decision Making: Low Complexity  REQUIRES OT FOLLOW-UP: No  Activity Tolerance  Activity Tolerance: Patient Tolerated treatment well        Plan   Plan  Times per Week: OT eval only     Restrictions       Subjective   General  Chart Reviewed: Yes  Patient assessed for rehabilitation services?: Yes  Family / Caregiver Present: No  Diagnosis: Acute kidney injury  General Comment  Comments: Pt. states he is feeling fine     Social/Functional History  Social/Functional History  Lives With: Spouse  Type of Home: House  Bathroom Shower/Tub: Walk-in shower  Bathroom Toilet: Standard  Has the patient had two or more falls in the past year or any fall with injury in the past year?: No  ADL Assistance: Independent  Ambulation Assistance: Independent  Transfer Assistance: Independent  Additional Comments: Uses no AD to walk but has a walker if he needs it       Objective   Heart Rate: 62  BP: (!) 139/57  MAP (Calculated): 84.33  Resp: 17  SpO2: 98 %  Hearing: Within functional limits                   AROM: Within functional limits  Strength:  Within functional limits  Coordination: Within functional limits  ADL  Feeding: Independent  Grooming: Independent  UE Bathing: Supervision  LE Bathing: Supervision  UE Dressing: Supervision  LE Dressing: Supervision  Toileting: Supervision        Bed mobility  Supine to Sit: Independent  Transfers  Stand Step Transfers: Supervision  Sit to stand: Supervision  Transfer Comments: RW     Cognition  Overall Cognitive Status: WFL                                           G-Code     OutComes Score                                                  AM-PAC Score Goals  Short Term Goals  Time Frame for Short term goals: OT eval only  Long Term Goals  Time Frame for Long term goals : OT eval only       Therapy Time   Individual Concurrent Group Co-treatment   Time In           Time Out           Minutes                   Ann Marie Thompson OT

## 2022-05-31 NOTE — DISCHARGE SUMMARY
[facility]    Discharge Summary      Jon Syed  :  1940  MRN:  [id number]    Admit date:  2022  Discharge date:    2022    Discharging Physician:  Dr. Fernando Chappell Directive: Full Code    Consults: cardiology and nephrology    Primary Care Physician:  mEmett Thrasher MD    Discharge Diagnoses:  Principal Problem:    Acute kidney injury superimposed on CKD [facility])  Active Problems:    Hyperlipidemia    Acute renal failure (ARF) ([address]    Hypoglycemia  Resolved Problems:    * No resolved hospital problems. *      Portions of this note have been copied forward, however, changed to reflect the most current clinical status of this patient. Hospital Course: The patient is a 78-year-old male with past medical history of CKD 2, COPD, hypertension, hyperlipidemia, PVD, diabetes and GERD who presented to [facility] ED with complaints of hypoglycemia. Patient was noted to very combative at home with altered mental status. EMS was called. Accucheck showed blood glucose of 31, was given amp of D50 per EMS. Accucheck Recheck in ED was noted to be 36, received more D50 in ED and then started on D5 1/2 NS gtt. Further workup in ED revealed K+ 3.3, Cr 3.4, GFR 17, ( Previous Cr 2.9 on 2022), WBC 11.5, Hgb 12.0. Off note, patient was recently admitted here for acute on chronic CHF and was sent home on Bumex. Patient was admitted to hospital medicine for ANA on CKD and hypoglycemia with nephrology consultation. Renal function improved slightly with IVFs. Hypoglycemia improved, D5 drip discontinued. HgbA1c 5.7, patient has been advised to stop taking his antidiabetic medications and follow-up with PCP. He has been advised to follow-up with PCP, nephrology and cardiology. Patient is currently in stable condition to be discharged Home. Significant Diagnostic Studies:   No results found.     Pertinent Labs:   CBC:   Recent Labs     22  2109 22  0722 22  0230   WBC Pt c/o constant tightness in her belly and cramping since 8pm  11.5* 7.3 7.5   HGB 12.0* 10.9* 10.3*    153 158     BMP:    Recent Labs     05/28/22 2109 05/28/22 2109 05/28/22 2324 05/30/22 0722 05/31/22  0230     --   --  135* 138   K 3.3*  --   --  3.6 3.9     --   --  97* 101   CO2 28  --   --  27 24   BUN 64*  --   --  55* 52*   CREATININE 3.4*  --   --  3.0* 2.9*   GLUCOSE 77   < > 36 132* 173*    < > = values in this interval not displayed. INR:   Recent Labs     05/30/22 0722   INR 1.13       Physical Exam:   Vital Signs: BP (!) 139/57   Pulse 62   Temp 97 °F (36.1 °C)   Resp 17   Ht 6' (1.829 m)   Wt 212 lb 12.8 oz (96.5 kg)   SpO2 98%   BMI 28.86 kg/m²   General appearance:. Alert and Cooperative   HEENT: Normocephalic. Chest: Lung sounds clear bilaterally without wheezes or rhonchi. Cardiac: RRR, S1, S2 normal. No murmurs, gallops, or rubs auscultated. Abdomen: soft, non-tender; non-distended normal bowel sounds no masses, no organomegaly. Extremities: No clubbing or cyanosis. No peripheral edema. Peripheral pulses palpable. Neurologic: Grossly intact.         Discharge Medications:          Medication List      CHANGE how you take these medications    amiodarone 200 MG tablet  Commonly known as: CORDARONE  TAKE 1 TABLET BY MOUTH DAILY  What changed: when to take this     metoprolol succinate 100 MG extended release tablet  Commonly known as: TOPROL XL  Take 1 tablet by mouth daily  What changed: how much to take     vitamin D 1.25 MG (43997 UT) Caps capsule  Commonly known as: ERGOCALCIFEROL  Take 1 capsule by mouth once a week  What changed: additional instructions        CONTINUE taking these medications    aspirin 81 MG EC tablet  Take 1 tablet by mouth daily     atorvastatin 20 MG tablet  Commonly known as: LIPITOR  Take 1 tablet by mouth daily     bumetanide 1 MG tablet  Commonly known as: BUMEX  Take 2 tablets by mouth daily     ferrous sulfate 325 (65 Fe) MG tablet  Commonly known as: IRON 325  Take 1 tablet by mouth 2 times daily (with meals)     folic acid 1 MG tablet  Commonly known as: FOLVITE  Take 1 tablet by mouth daily     metOLazone 2.5 MG tablet  Commonly known as: ZAROXOLYN  Take 1 tablet by mouth daily     mirabegron 25 MG Tb24  Commonly known as: MYRBETRIQ  Take 1 tablet by mouth daily     NIFEdipine 60 MG extended release tablet  Commonly known as: ADALAT CC  Take 1 tablet by mouth daily     OXYGEN  Inhale 2 L into the lungs continuous     pantoprazole 20 MG tablet  Commonly known as: PROTONIX     Senna Plus 8.6-50 MG per tablet  Generic drug: senna-docusate     tamsulosin 0.4 mg capsule  Commonly known as: FLOMAX  Take 2 capsules by mouth daily     Trulance 3 MG Tabs  Generic drug: Plecanatide  TAKE 1 TABLET BY MOUTH DAILY     UNABLE TO FIND        STOP taking these medications    glipiZIDE 10 MG extended release tablet  Commonly known as: GLUCOTROL XL               Discharge Instructions: Follow up with Mickey Bhatti MD in 7 days. Follow-up with nephrology and cardiology as recommended. Take medications as directed. Resume activity as tolerated. Diet: ADULT DIET; Regular; 3 carb choices (45 gm/meal); Low Fat/Low Chol/High Fiber/MAGED; 1500 ml     Disposition: Patient is medically stable and will be discharged home. Time spent on discharge 36 minutes spent in assessing patient, reviewing medications, discussion with nursing, confirming safe discharge plan and preparation of discharge summary. Signed:  Electronically signed by ELISABETH Patel CNP on 5/31/22 at 9:14 AM CDT         EMR Dragon/Transcription disclaimer:   Much of this encounter note is an electronic transcription/translation of spoken language to printed text.  The electronic translation of spoken language may permit erroneous, or at times, nonsensical words or phrases to be inadvertently transcribed; although attempts have made to review the note for such errors, some may still exist.

## 2022-05-31 NOTE — CARE COORDINATION
Date / Time of Evaluation:   5/31/2022    2:30 PM  Assessment Completed by:   Joby Campos RN      Patient Name:   Leonor Schultz  MRN:   [id number]  YOB: 1940    Patient Admission Status:   inpt     Patient Contact Information:    Roger Raymond  [phone] (home)   Telephone Information:   Mobile [phone]     Above information verified? [x]   Yes  []   No    (Best Practice:   Have patient/caregiver verify above address and phone number by stating out loud their current address and reachable phone number. Initial Assessment Completed at bedside with:      [x]   Patient  []   Family/Caregiver/Guardian   []   Other:      Current PCP:    Cody Escamilla MD    PCP verified? [x]   Yes  []   No    Emergency Contacts:    Extended Emergency Contact Information  Primary Emergency Contact: Baljinder Levy  Address: [address] [place] [address] Phone: [phone]  Mobile Phone: [phone]  Relation: Spouse  Secondary Emergency Contact: Hernando Velásquez[address] Phone: [phone]  Relation: Child    Advance Directives:    Does Mr. Salvador Zamudio have an advance directive in his electronic medical record? []   Yes  [x]   No    Code Status:   Full Code      Have you been vaccinated for COVID-19 (SARS-CoV-2)? [x]   Yes  []   No                   If so, when?     Which :         [x]   [employer]  []   Moderna  []   [employer]  []   Other:       Do you have any of the following unmet social needs that would keep you from returning home safely:    []   Yes  [x]   No                    Unmet Social Needs:           []   Living Situation/Housing  []   Food  []   Stroke Education   []   Utilities  []   Personal Safety  []   Financial Strain  []   Employment  []   Mental Health  []   Substance Abuse  []   Transportation Barriers    Additional Unmet Social Needs Notes: Financial:    Payor: Frida Olivares / Plan: [employer] / Product Type: *No Product type* /     Pre-Cert required for SNF:     []   Yes  [x]   No    Have Long Term Care Insurance:      []   Yes  [x]   No      Pharmacy:    [id number], [address] [phone] - F [phone]  [address] [address]  Phone: [phone] Fax: [phone]    [address] [place] [address] [address] - P [phone] [facility] [address] Ferguson [address] [address]  Phone: [phone] Fax: [phone]    Potential assistance purchasing medications? []   Yes  [x]   No      ADLS:       Support System:   Children,Spouse/Significant Other      Current Home Environment:       Steps:       []   Yes  [x]   No    If yes, how many? Plans to RETURN to current housing:     [x]   Yes  []   No    Barriers to RETURNING to current housing:        Currently ACTIVE with Home Health CARE:      []   Yes  [x]   No    Home Health Care Agency:        DME Provider:   4 wheeled walker and     Had [facility] prior to admission:      []   Yes  [x]   No        Active with HD/PD prior to admission:             []   Yes  [x]   No    Nephrologist:     HD Center:         Transition Plan:       Transportation PLAN for Discharge:  Spouse   Factors facilitating achievement of predicted outcomes:     Barriers to discharge:        Patient Deficits:    []   Yes   []   No    If yes:    []   Confusion/Memory  []   Visual  []   Motor/Sensory         []   Right arm         []   Right leg         []   Left arm         []   Left leg  []   Language/Speech         []   Aphasia         []   Dysarthria         []   Swallow         Dago Coma Scale  Eye Opening: Spontaneous  Best Verbal Response: Oriented  Best Motor Response: Obeys commands  Dago Coma Scale Score: 15    Patient Deficit Notes:            Additional CM/SW Notes: pt lives at home with spouse and plans same upon dc. Pt and spouse still drives, wears cpap at noc. Has all dme at home he needs . Pt did not want HH. No needs noted. Electronically signed by Art Moreira RN on 5/31/2022 at 2:34 PM        Asa Lot and/or his family were provided with choice of provider:    []   Yes   [x]   No        []   Stroke education booklet reviewed and given to patient/family/caregiver/guardian. All questions answered all questions answered appropriately and efficiently per family.       Art Moreira RN  [facility]  Care Management  Phone:   [phone]          Fax:

## 2022-06-01 ENCOUNTER — OFFICE VISIT (OUTPATIENT)
Dept: PALLATIVE CARE | Age: 82
End: 2022-06-01
Payer: MEDICARE

## 2022-06-01 VITALS
RESPIRATION RATE: 16 BRPM | OXYGEN SATURATION: 97 % | TEMPERATURE: 97.4 F | HEART RATE: 60 BPM | SYSTOLIC BLOOD PRESSURE: 122 MMHG | DIASTOLIC BLOOD PRESSURE: 60 MMHG

## 2022-06-01 DIAGNOSIS — J96.12 CHRONIC RESPIRATORY FAILURE WITH HYPOXIA AND HYPERCAPNIA (HCC): ICD-10-CM

## 2022-06-01 DIAGNOSIS — J44.9 CHRONIC OBSTRUCTIVE PULMONARY DISEASE, UNSPECIFIED COPD TYPE (HCC): ICD-10-CM

## 2022-06-01 DIAGNOSIS — N18.4 CKD (CHRONIC KIDNEY DISEASE), STAGE IV (HCC): ICD-10-CM

## 2022-06-01 DIAGNOSIS — R53.81 PHYSICAL DECONDITIONING: ICD-10-CM

## 2022-06-01 DIAGNOSIS — E16.2 HYPOGLYCEMIA: ICD-10-CM

## 2022-06-01 DIAGNOSIS — Z51.5 ENCOUNTER FOR PALLIATIVE CARE: ICD-10-CM

## 2022-06-01 DIAGNOSIS — J96.11 CHRONIC RESPIRATORY FAILURE WITH HYPOXIA AND HYPERCAPNIA (HCC): ICD-10-CM

## 2022-06-01 DIAGNOSIS — T14.8XXA ABRASION OF SKIN: Primary | ICD-10-CM

## 2022-06-01 DIAGNOSIS — K59.09 OTHER CONSTIPATION: ICD-10-CM

## 2022-06-01 DIAGNOSIS — I50.32 CHRONIC DIASTOLIC CONGESTIVE HEART FAILURE (HCC): ICD-10-CM

## 2022-06-01 PROCEDURE — 99349 HOME/RES VST EST MOD MDM 40: CPT | Performed by: NURSE PRACTITIONER

## 2022-06-01 PROCEDURE — 1123F ACP DISCUSS/DSCN MKR DOCD: CPT | Performed by: NURSE PRACTITIONER

## 2022-06-01 PROCEDURE — 1036F TOBACCO NON-USER: CPT | Performed by: NURSE PRACTITIONER

## 2022-06-01 PROCEDURE — G8417 CALC BMI ABV UP PARAM F/U: HCPCS | Performed by: NURSE PRACTITIONER

## 2022-06-01 ASSESSMENT — ENCOUNTER SYMPTOMS
ABDOMINAL DISTENTION: 0
SHORTNESS OF BREATH: 1
DIARRHEA: 0
EYES NEGATIVE: 1
WHEEZING: 0
CONSTIPATION: 1
BACK PAIN: 1

## 2022-06-01 NOTE — PROGRESS NOTES
Supportive Care/Community Based Palliative Care  Follow Up Note        Patient Name:  Ashlie Husain Record Number:  [id number]  YOB: 1940    Date of Visit: 6/1/2022  Location of Visit:  Home    Patient Care Team:  Dr. Cruz Shore - PCP  Dr. Jimi Dutton - pulmonology  Dr. Bakari Mckee - cardiology  Dr. Agatha Murray - urology  Dr. Georgette Hughes, APRN- nephrology  [address] [facility]     History obtained from:  patient, spouse, electronic medical record    PALLIATIVE DIAGNOSES AND ORDERS/RECOMMENDATIONS/PLAN:     Abrasion of skin  Both lower extremities  Gently wash daily with soap and water, monitor for s/s of infection discussed  Avoid further trauma  Elevate lower ext as tolerated    Hypoglycemia  Recent hospitalization  Glipizide has now been discontinued  Recent hgb A1c less than 6%  Follow up with Dr. Francisco Barakat as scheduled    Physical deconditioning  Encouraged use of Rollator when ambulating  He has declined HH for PT    Chronic diastolic congestive heart failure ([address]  Continue Bumex and Zaroxolyn as prescribed  Daily weights encouraged    Chronic respiratory failure with hypoxia and hypercapnia ([address]  Continue Trilogy with O2 at bedtime and when napping    Chronic obstructive pulmonary disease, unspecified COPD type ([address]    Other constipation  Continue Trulance and Senna daily, Miralax prn as per GI    CKD (chronic kidney disease), stage IV ([address]  Follow-up with nephrology as scheduled    Encounter for palliative care  I encouraged patient and his wife to notify prescribing provider or myself if he has trouble obtaining the medications in the future. Also encouraged him to call with any changes in condition. I am happy to see him to see him at home at any time in an effort to catch any problems early and and hopefully help him avoid hospitalization.   He voiced understanding  Current goals of care include: Preserve independence/control/autonomy, Maintain/Improve function quality of life, Remain at home, Would want hospitalization if needed  Will continue goals of care discussions based on clinical course  Follow up home visit in 4 weeks    CHIEF COMPLAINT:     Chief Complaint   Patient presents with    Fatigue     CLINICAL SUMMARY:          Steve Hernandez is a [de-identified] y.o. male with PMH of COPD, CKD stage III, diabetes, HTN, GERD, hyperlipidemia, PVD,  CAD,  chronic back pain, osteoarthritis, diverticulitis, bladder cancer and more recently recurrent pneumonia, NSVT, sinus node dysfunction with prolonged pauses requiring pacemaker placement, chronic respiratory failure and now requiring Trilogy use at night.     He has had four admissions between 1/17/2021 and 3/29/2021. These visits are summarized in initial consultation note dated 4/2/2021     Admitted to [facility] 8/25-8/28/2021 due to hypoglycemia after several days of not eating due to constipation and remaining on antidiabetic meds.  Had ANA on CKD with creatinine 3.3 which improved with IV fluids. HgbA1C was 6.3 and home diabetic meds discontinued.      11/18/2021 to 11/30/2021 required admission including ICU stay intubated and on mechanical ventilation due to acute decompensated heart failure as well as respiratory distress.  Patient continues on diuresis, nephrology/cardiology following.  Continues on doxycycline/methylprednisolone. TTE completed revealing grade 2 diastolic dysfunction with preserved ejection fraction, mild mitral regurgitation.  Bowel regimen was optimized patient had a large bowel movement, KUB revealing stool retained. Discharged on MiraLAX and senna. Required ED visit in December 2021 due to severe constipation with fecal impaction thought to be caused by trospium and this was continued.     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  EUS 3/7/2022: Small ovoid shaped gastric nodule which appeared to be involving only mucosa and submucosa and approximately 5 mL x 15 mm in size, benign-appearing, homogeneous, isoechoic. Musculus propria in this area. Centrally normal.  The gastric nodule may be representing mild thickening of gastric mucosa and semiclosed in this area based on his EUS appearance. Previous biopsies few weeks ago had revealed benign findings pertaining to the mucosa. No further biopsies done. Recommendations repeat EGD with EUS in 1 year. Admitted to [address] [place] [address] 5/24/2022 to 5/26/2022 due to acute exacerbation of congestive heart failure after being off of his diuretics for about a week. Diuresed intravenously with improvement in symptoms. Admitted to [address] [place] [address] 5/28/2022 to 5/31/2022 due to symptomatic hypoglycemia. He had noted low blood sugar at home. He drank orange juice with added sugar. He had no improvement he called EMS and paramedics administered D50. After several hours blood sugar dropped again and he became very symptomatic with altered mental status, combativeness. [facility] was called and he was transported to [place]. He was started on IV drip with dextrose. Hemoglobin A1c was noted to be 5.7. His glipizide was discontinued. He was seen by cardiology and nephrology. 2D echo 5/25/2022: Mild mitral regurgitation, mild aortic stenosis with mean gradient 8 mg mercury, LV EF estimated at 50 to 55%    HPI AND VISIT SUMMARY:     I saw Rochelle Emery in His home. Upon arrival he was noted to be awake, alert, in no acute distress sitting in recliner in the living room. His wife participated in part of the visit. We discussed his recent hospitalization. He reports his diuretic was out of stock at pharmacy and he that he can go without it. It took several days before he was able to obtain the medication and by then he was retaining more fluid required hospitalization.   He reports being in n.p.o. for about 48 hours. When he was eventually discharged he did not feel great and did not eat as much. On the date of his second admission he did have a tuna sandwich and a bowl of raisin Bran with a banana that morning. Unfortunately began to have symptoms of hypoglycemia. He attempted to keep her his blood sugar up with orange juice with teaspoons of sugar added. EMS even came to the home and gave him D50. Blood sugar improved but then dropped again. He became significantly symptomatic and combative. He has abrasions to his legs. He is only taking glipizide we reviewed his recent hemoglobin A1c. We discussed symptoms of hypoglycemia, he had a feeling of very being very hot for several minutes followed by diaphoresis. Reports if this ever occurs again he will check his blood sugar immediately. Discussed using the orange juice with sugar and also trying to eat peanut butter and crackers along with it and not to skip meals. He plans to see cardiology and nephrology next week. He indicates desire to continue with aggressive care such as hospitalization if needed. He reports feeling \"as good as I am probably can get\" prior to his hospitalization. He was able to be more active and do more things that he enjoys. He hopes to be able to improve his functional status so that he can do those things. Reports his father lived to be 80 and his grandparents also live to be near [facility].     Listened to patient/family/caregiver concerns, Assessed family understanding and coping and Elicited patient's goals and values, and used these to establish or modify goals of care     ADVANCED CARE PLANNING:                                            Current Designated Health Care Decision Maker:     Primary Decision Maker: Sidney Pedroza  [phone]    Secondary Decision Maker: [address] [place] [address] [phone]    Secondary Decision Maker: [facility] - [phone]    Durable Power of :no     Advanced Directives/Living Jung: yes  On file     Out of hospital medical orders in place to reflect resuscitation status: no       FUNCTIONAL ASSESSMENT:     Palliative Performance Scale:  60% Ambulation reduced; Significant disease; Can't do hobbies/housework; intake normal or reduced; occasional assist; LOC full/confusion    HISTORY:     Past medical history, surgical history, family history, social history unchanged    Current Medications:      Current Outpatient Medications:     bumetanide (BUMEX) 1 MG tablet, Take 2 tablets by mouth daily, Disp: 180 tablet, Rfl: 0    metOLazone (ZAROXOLYN) 2.5 MG tablet, Take 1 tablet by mouth daily (Patient taking differently: Take 2.5 mg by mouth daily as needed ), Disp: 90 tablet, Rfl: 0    mirabegron (MYRBETRIQ) 25 MG TB24, Take 1 tablet by mouth daily, Disp: 90 tablet, Rfl: 3    amiodarone (CORDARONE) 200 MG tablet, TAKE 1 TABLET BY MOUTH DAILY (Patient taking differently: Take 200 mg by mouth 2 times daily ), Disp: 30 tablet, Rfl: 3    TRULANCE 3 MG TABS, TAKE 1 TABLET BY MOUTH DAILY, Disp: 30 tablet, Rfl: 5    tamsulosin (FLOMAX) 0.4 MG capsule, Take 2 capsules by mouth daily, Disp: 60 capsule, Rfl: 11    pantoprazole (PROTONIX) 20 MG tablet, Take 40 mg by mouth daily , Disp: , Rfl:     SENNA PLUS 8.6-50 MG per tablet, , Disp: , Rfl:     atorvastatin (LIPITOR) 20 MG tablet, Take 1 tablet by mouth daily, Disp: 30 tablet, Rfl: 0    metoprolol succinate (TOPROL XL) 100 MG extended release tablet, Take 1 tablet by mouth daily, Disp: 30 tablet, Rfl: 3    vitamin D (ERGOCALCIFEROL) 1.25 MG (28114 UT) CAPS capsule, Take 1 capsule by mouth once a week (Patient taking differently: Take 50,000 Units by mouth once a week Mondays), Disp: 5 capsule, Rfl: 0    UNABLE TO FIND, Take 20 mg by mouth daily trosopium (Patient not taking: Reported on 6/1/2022), Disp: , Rfl:     aspirin 81 MG EC tablet, Take 1 tablet by mouth daily (Patient not taking: Reported on 5/24/2022), Disp: 30 tablet, Rfl: 3    NIFEdipine (ADALAT CC) 60 MG extended release tablet, Take 1 tablet by mouth daily, Disp: 30 tablet, Rfl: 3    ferrous sulfate (IRON 325) 325 (65 Fe) MG tablet, Take 1 tablet by mouth 2 times daily (with meals) (Patient not taking: Reported on 5/24/2022), Disp: 30 tablet, Rfl: 3    folic acid (FOLVITE) 1 MG tablet, Take 1 tablet by mouth daily, Disp: 30 tablet, Rfl: 3    OXYGEN, Inhale 2 L into the lungs continuous, Disp: 1 Container, Rfl: 5     Allergies:  Eliquis [apixaban] and Promethazine hcl    Review of Systems  Review of Systems   Constitutional: Positive for activity change ( Decreased) and fatigue. HENT: Positive for congestion (Nasal, chronic                         ).    Eyes: Negative. Respiratory: Positive for shortness of breath. Negative for wheezing. Cardiovascular: Positive for leg swelling. Negative for chest pain and palpitations. Gastrointestinal: Positive for constipation ( Currently controlled). Negative for abdominal distention and diarrhea. Genitourinary:        Incontinence, followed by urology   Musculoskeletal: Positive for arthralgias and back pain ( Chronic). Neurological: Positive for weakness ( Generalized).       OBJECTIVE:     Vitals:    06/01/22 1521   BP: 122/60   Pulse: 60   Resp: 16   Temp: 97.4 °F (36.3 °C)   SpO2: 97%     General appearance:  alert and cooperative with exam, vital signs stable, well nourished, well developed  HEENT: normocephalic, , sclera clear, conjunctiva pink, EOMI, external ears normal, small abrasion to right side of nose near eye, lips normal, oral mucosa moist   Neck: supple, trachea midline  Lungs: equal bilateral expansion, clear to auscultation bilaterally, no cough, no dyspnea  Heart: regular rate and rhythm  Abdomen:  soft, non-tender, bowel sounds active, no masses noted  Extremities:  trace LE edema  Neurologic: no focal neurologic deficits, normal sensation, no tremor, alert and oriented Psychiatric: alert and oriented, no recent or remote memory deficits, good judgement, mood and affect appropriate  Skin: warm, dry, abrasions with scabbing to lower extremitiesd, no s/s of infection    LAB DATA REVIEWED:   Admission on 05/28/2022, Discharged on 05/31/2022   Component Date Value    Sodium 05/28/2022 142     Potassium 05/28/2022 3.3*    Chloride 05/28/2022 100     CO2 05/28/2022 28     Anion Gap 05/28/2022 14     Glucose 05/28/2022 77     BUN 05/28/2022 64*    CREATININE 05/28/2022 3.4*    GFR Non- 05/28/2022 17*    GFR  05/28/2022 21*    Calcium 05/28/2022 9.2     Total Protein 05/28/2022 6.9     Albumin 05/28/2022 3.9     Total Bilirubin 05/28/2022 0.4     Alkaline Phosphatase 05/28/2022 134*    ALT 05/28/2022 48*    AST 05/28/2022 52*    WBC 05/28/2022 11.5*    RBC 05/28/2022 4.15*    Hemoglobin 05/28/2022 12.0*    Hematocrit 05/28/2022 38.2*    MCV 05/28/2022 92.0     MCH 05/28/2022 28.9     MCHC 05/28/2022 31.4*    RDW 05/28/2022 13.3     Platelets 45/83/0168 181     MPV 05/28/2022 9.8     Neutrophils % 05/28/2022 86.1*    Lymphocytes % 05/28/2022 5.8*    Monocytes % 05/28/2022 6.4     Eosinophils % 05/28/2022 0.0     Basophils % 05/28/2022 0.3     Neutrophils Absolute 05/28/2022 9.9*    Immature Granulocytes # 05/28/2022 0.2     Lymphocytes Absolute 05/28/2022 0.7*    Monocytes Absolute 05/28/2022 0.70     Eosinophils Absolute 05/28/2022 0.00     Basophils Absolute 05/28/2022 0.00     POC Glucose 05/28/2022 90     Performed on 05/28/2022 AccuChek     Glucose 05/28/2022 36     POC Glucose 05/28/2022 36*    Performed on 05/28/2022 AccuChek     POC Glucose 05/28/2022 129*    Performed on 05/28/2022 AccuChek     Blood Culture, Routine 05/29/2022   Bottle volume = 6 ml*    Organism 05/29/2022 Staphylococcus hominis*    Blood Culture, Routine 05/29/2022                      Value:Isolated from Aerobic and Anaerobic bottle  No further workup  This organism was isolated from one set. Susceptibility testing is not routinely done as this  organism frequently represents skin contamination. Additional testing can be ordered by calling the  Microbiology Department.  Culture, Blood 2 05/29/2022 No Growth to date. Any change in status will be called.      Magnesium 05/28/2022 2.0     POC Glucose 05/29/2022 90     Performed on 05/29/2022 AccuChek     POC Glucose 05/29/2022 56*    Performed on 05/29/2022 AccuChek     Pro-BNP 05/29/2022 2,009*    POC Glucose 05/29/2022 84     Performed on 05/29/2022 AccuChek     POC Glucose 05/29/2022 45*    Performed on 05/29/2022 AccuChek     POC Glucose 05/29/2022 78     Performed on 05/29/2022 AccuChek     Sodium 05/30/2022 135*    Potassium reflex Magnesi* 05/30/2022 3.6     Chloride 05/30/2022 97*    CO2 05/30/2022 27     Anion Gap 05/30/2022 11     Glucose 05/30/2022 132*    BUN 05/30/2022 55*    CREATININE 05/30/2022 3.0*    GFR Non- 05/30/2022 20*    GFR  05/30/2022 24*    Calcium 05/30/2022 8.6*    Total Protein 05/30/2022 5.4*    Albumin 05/30/2022 3.5     Total Bilirubin 05/30/2022 0.3     Alkaline Phosphatase 05/30/2022 97     ALT 05/30/2022 36     AST 05/30/2022 23     Lactic Acid 05/30/2022 0.8     WBC 05/30/2022 7.3     RBC 05/30/2022 3.77*    Hemoglobin 05/30/2022 10.9*    Hematocrit 05/30/2022 35.0*    MCV 05/30/2022 92.8     MCH 05/30/2022 28.9     MCHC 05/30/2022 31.1*    RDW 05/30/2022 13.8     Platelets 04/91/6130 153     MPV 05/30/2022 9.8     Neutrophils % 05/30/2022 65.9*    Lymphocytes % 05/30/2022 18.8*    Monocytes % 05/30/2022 9.9     Eosinophils % 05/30/2022 0.0     Basophils % 05/30/2022 1.0     Neutrophils Absolute 05/30/2022 4.8     Immature Granulocytes # 05/30/2022 0.3     Lymphocytes Absolute 05/30/2022 1.4     Monocytes Absolute 05/30/2022 0.70     Eosinophils Absolute 05/30/2022 0.00     Basophils Absolute 05/30/2022 0.10     Protime 05/30/2022 14.5     INR 05/30/2022 1.13     aPTT 05/30/2022 30.3     POC Glucose 05/29/2022 86     Performed on 05/29/2022 AccuChek     Acinetobacter calcoac ba* 05/29/2022 Not Detected     Bacteroides fragilis by * 05/29/2022 Not Detected     Enterobacteriaceae by PCR 05/29/2022 Not Detected     Enterobacter cloacae com* 05/29/2022 Not Detected     Enterococcus faecalis by* 05/29/2022 Not Detected     Enterococcus faecium by * 05/29/2022 Not Detected     Escherichia coli by PCR 05/29/2022 Not Detected     Haemophilus Influenzae b* 05/29/2022 Not Detected     Klebsiella aerogenes by * 05/29/2022 Not Detected     Klebsiella oxytoca by PCR 05/29/2022 Not Detected     Klebsiella pneumoniae gr* 05/29/2022 Not Detected     Listeria monocytogenes b* 05/29/2022 Not Detected     Neisseria meningitidis b* 05/29/2022 Not Detected     Proteus species by PCR 05/29/2022 Not Detected     Pseudomonas aeruginosa b* 05/29/2022 Not Detected     Salmonella species by PCR 05/29/2022 Not Detected     Streptococcus agalactiae* 05/29/2022 Not Detected     Staphylococcus aureus by* 05/29/2022 Not Detected     Staphylococcus epidermid* 05/29/2022 Not Detected     Staphylococcus lugdunens* 05/29/2022 Not Detected     Staphylococcus species b* 05/29/2022 DETECTED*    Serratia marcescens by P* 05/29/2022 Not Detected     Streptococcus pneumoniae* 05/29/2022 Not Detected     Streptococcus pyogenes  * 05/29/2022 Not Detected     Streptococcus species by* 05/29/2022 Not Detected     Stenotrophomonas maltoph* 05/29/2022 Not Detected     Candida albicans by PCR 05/29/2022 Not Detected     Candida auris by PCR 05/29/2022 Not Detected     Candida glabrata by PCR 05/29/2022 Not Detected     Candida krusei by PCR 05/29/2022 Not Detected     Candida parapsilosis by * 05/29/2022 Not Detected     Candida tropicalis by PCR 05/29/2022 Not Detected     Cryptococcus neoformans/* 05/29/2022 Not Detected     Cephalosporin Resistance* 05/29/2022 N/A     Carbapenem Resistance IM* 05/29/2022 N/A     Carbapenem Resistance KP* 05/29/2022 N/A     Methicillin Resistance m* 05/29/2022 N/A     Methicillin Resistance m* 05/29/2022 N/A     Colistin Resistance mcr-* 05/29/2022 N/A     Carbapenem Resistance ND* 05/29/2022 N/A     Carbapenem Resistance OX* 05/29/2022 N/A     Carbapenem Resistance VI* 05/29/2022 N/A     Vancomycin Resistant by * 05/29/2022 N/A     POC Glucose 05/30/2022 139*    Performed on 05/30/2022 AccuChek     Blood Culture, Routine 05/30/2022 No Growth to date. Any change in status will be called.      PTH 05/30/2022 82.6*    POC Glucose 05/30/2022 197*    Performed on 05/30/2022 AccuChek     Hemoglobin A1C 05/30/2022 5.7     POC Glucose 05/30/2022 217*    Performed on 05/30/2022 AccuChek     WBC 05/31/2022 7.5     RBC 05/31/2022 3.59*    Hemoglobin 05/31/2022 10.3*    Hematocrit 05/31/2022 33.1*    MCV 05/31/2022 92.2     MCH 05/31/2022 28.7     MCHC 05/31/2022 31.1*    RDW 05/31/2022 13.2     Platelets 61/81/1824 158     MPV 05/31/2022 10.1     Sodium 05/31/2022 138     Potassium 05/31/2022 3.9     Chloride 05/31/2022 101     CO2 05/31/2022 24     Anion Gap 05/31/2022 13     Glucose 05/31/2022 173*    BUN 05/31/2022 52*    CREATININE 05/31/2022 2.9*    GFR Non- 05/31/2022 21*    GFR  05/31/2022 25*    Calcium 05/31/2022 8.6*    Total Protein 05/31/2022 5.4*    Albumin 05/31/2022 3.5     Total Bilirubin 05/31/2022 0.4     Alkaline Phosphatase 05/31/2022 99     ALT 05/31/2022 32     AST 05/31/2022 18     POC Glucose 05/30/2022 214*    Performed on 05/30/2022 AccuChek     POC Glucose 05/31/2022 168*    Performed on 05/31/2022 AccuChek    Admission on 05/24/2022, Discharged on 05/26/2022   Component Date Value    WBC 05/24/2022 11.4*    RBC 05/24/2022 4.39*    Hemoglobin 05/24/2022 12.7*    Hematocrit 05/24/2022 40.9*    MCV 05/24/2022 93.2     MCH 05/24/2022 28.9     MCHC 05/24/2022 31.1*    RDW 05/24/2022 13.5     Platelets 82/72/1974 150     MPV 05/24/2022 10.4     Neutrophils % 05/24/2022 84.7*    Lymphocytes % 05/24/2022 5.2*    Monocytes % 05/24/2022 8.6     Eosinophils % 05/24/2022 0.0     Basophils % 05/24/2022 0.3     Neutrophils Absolute 05/24/2022 9.7*    Immature Granulocytes # 05/24/2022 0.1     Lymphocytes Absolute 05/24/2022 0.6*    Monocytes Absolute 05/24/2022 1.00*    Eosinophils Absolute 05/24/2022 0.00     Basophils Absolute 05/24/2022 0.00     Sodium 05/24/2022 136     Potassium reflex Magnesi* 05/24/2022 4.1     Chloride 05/24/2022 99     CO2 05/24/2022 23     Anion Gap 05/24/2022 14     Glucose 05/24/2022 210*    BUN 05/24/2022 51*    CREATININE 05/24/2022 2.5*    GFR Non- 05/24/2022 25*    GFR  05/24/2022 30*    Calcium 05/24/2022 9.9     Total Protein 05/24/2022 7.8     Albumin 05/24/2022 4.8     Total Bilirubin 05/24/2022 0.9     Alkaline Phosphatase 05/24/2022 118     ALT 05/24/2022 26     AST 05/24/2022 26     Troponin 05/24/2022 0.04*    Pro-BNP 05/24/2022 4,513*    Adenovirus by PCR 05/24/2022 Not Detected     Bordetella parapertussis* 05/24/2022 Not Detected     Bordetella pertussis by * 05/24/2022 Not Detected     Chlamydophilia pneumonia* 05/24/2022 Not Detected     Coronavirus 229E by PCR 05/24/2022 Not Detected     Coronavirus HKU1 by PCR 05/24/2022 Not Detected     Coronavirus NL63 by PCR 05/24/2022 Not Detected     Coronavirus OC43 by PCR 05/24/2022 Not Detected     SARS-CoV-2, PCR 05/24/2022 Not Detected     Human Metapneumovirus by* 05/24/2022 Not Detected     Human Rhinovirus/Enterov* 05/24/2022 Not Detected     Influenza A by PCR 05/24/2022 Not Detected     Influenza B by PCR 05/24/2022 Not Detected     Mycoplasma pneumoniae by* 05/24/2022 Not Detected     Parainfluenza Virus 1 by* 05/24/2022 Not Detected     Parainfluenza Virus 2 by* 05/24/2022 Not Detected     Parainfluenza Virus 3 by* 05/24/2022 Not Detected     Parainfluenza Virus 4 by* 05/24/2022 Not Detected     Respiratory Syncytial Vi* 05/24/2022 Not Detected     Troponin 05/25/2022 0.04*    Cholesterol, Total 05/25/2022 95*    Triglycerides 05/25/2022 64     HDL 05/25/2022 48*    LDL Calculated 05/25/2022 34     TSH 05/24/2022 2.040     T4 Free 05/24/2022 1.73*    WBC 05/25/2022 11.6*    RBC 05/25/2022 3.84*    Hemoglobin 05/25/2022 11.2*    Hematocrit 05/25/2022 35.8*    MCV 05/25/2022 93.2     MCH 05/25/2022 29.2     MCHC 05/25/2022 31.3*    RDW 05/25/2022 13.7     Platelets 68/29/0055 135     MPV 05/25/2022 10.3     Neutrophils % 05/25/2022 83.8*    Lymphocytes % 05/25/2022 4.4*    Monocytes % 05/25/2022 9.6     Eosinophils % 05/25/2022 0.3     Basophils % 05/25/2022 0.3     Neutrophils Absolute 05/25/2022 9.7*    Immature Granulocytes # 05/25/2022 0.2     Lymphocytes Absolute 05/25/2022 0.5*    Monocytes Absolute 05/25/2022 1.10*    Eosinophils Absolute 05/25/2022 0.00     Basophils Absolute 05/25/2022 0.00     Sodium 05/25/2022 142     Potassium 05/25/2022 4.1     Chloride 05/25/2022 103     CO2 05/25/2022 23     Anion Gap 05/25/2022 16     Glucose 05/25/2022 193*    BUN 05/25/2022 52*    CREATININE 05/25/2022 2.5*    GFR Non- 05/25/2022 25*    GFR  05/25/2022 30*    Calcium 05/25/2022 9.6     Magnesium 05/24/2022 2.2     Phosphorus 05/24/2022 3.1     Vit D, 25-Hydroxy 05/24/2022 60.7     pH, Arterial 05/25/2022 7.310*    pCO2, Arterial 05/25/2022 49.0*    pO2, Arterial 05/25/2022 57.0*    HCO3, Arterial 05/25/2022 24.7     Base Excess, Arterial 05/25/2022 -1.9     Hemoglobin, Art, Extended 05/25/2022 11.8*    O2 Sat, Arterial 05/25/2022 88.1*    Carboxyhgb, Arterial 05/25/2022 1.4     Methemoglobin, Arterial 05/25/2022 0.9     O2 Content, Arterial 05/25/2022 14.6     O2 Therapy 05/25/2022 Unknown     Potassium, Whole Blood 05/25/2022 3.8     Troponin 05/25/2022 0.04*    D-Dimer, Quant 05/25/2022 1.27*    Sodium 05/26/2022 142     Potassium 05/26/2022 3.5     Chloride 05/26/2022 102     CO2 05/26/2022 26     Anion Gap 05/26/2022 14     Glucose 05/26/2022 73*    BUN 05/26/2022 54*    CREATININE 05/26/2022 2.9*    GFR Non- 05/26/2022 21*    GFR  05/26/2022 25*    Calcium 05/26/2022 9.7        Established Patient Visit Time: 45 minutes, Greater than 50% of the time in this visit was spent counseling and coordinating care of this patient. This dictation was generated by voice recognition computer software. Although all attempts are made to edit the dictation for accuracy, there may be errors in the transcription that are not intended.     Electronically signed by ELISABETH Thompson CNP on 6/1/2022 at 9:27 PM

## 2022-06-03 LAB — CULTURE, BLOOD 2: NORMAL

## 2022-06-04 LAB — BLOOD CULTURE, ROUTINE: NORMAL

## 2022-06-07 ASSESSMENT — ENCOUNTER SYMPTOMS
ABDOMINAL PAIN: 0
DIARRHEA: 0
ABDOMINAL DISTENTION: 0
VOMITING: 0
SHORTNESS OF BREATH: 0

## 2022-06-08 ENCOUNTER — OFFICE VISIT (OUTPATIENT)
Dept: CARDIOLOGY CLINIC | Age: 82
End: 2022-06-08
Payer: MEDICARE

## 2022-06-08 VITALS
HEART RATE: 60 BPM | WEIGHT: 220 LBS | DIASTOLIC BLOOD PRESSURE: 78 MMHG | SYSTOLIC BLOOD PRESSURE: 138 MMHG | HEIGHT: 72 IN | BODY MASS INDEX: 29.8 KG/M2

## 2022-06-08 DIAGNOSIS — I25.10 CORONARY ARTERY DISEASE INVOLVING NATIVE CORONARY ARTERY OF NATIVE HEART WITHOUT ANGINA PECTORIS: ICD-10-CM

## 2022-06-08 DIAGNOSIS — I50.32 CHRONIC DIASTOLIC CONGESTIVE HEART FAILURE (HCC): Primary | ICD-10-CM

## 2022-06-08 DIAGNOSIS — I10 ESSENTIAL HYPERTENSION: ICD-10-CM

## 2022-06-08 DIAGNOSIS — Z95.0 PACEMAKER: ICD-10-CM

## 2022-06-08 DIAGNOSIS — I47.29 NONSUSTAINED VENTRICULAR TACHYCARDIA: ICD-10-CM

## 2022-06-08 PROCEDURE — G8427 DOCREV CUR MEDS BY ELIG CLIN: HCPCS | Performed by: CLINICAL NURSE SPECIALIST

## 2022-06-08 PROCEDURE — 99214 OFFICE O/P EST MOD 30 MIN: CPT | Performed by: CLINICAL NURSE SPECIALIST

## 2022-06-08 PROCEDURE — 93280 PM DEVICE PROGR EVAL DUAL: CPT | Performed by: CLINICAL NURSE SPECIALIST

## 2022-06-08 PROCEDURE — G8417 CALC BMI ABV UP PARAM F/U: HCPCS | Performed by: CLINICAL NURSE SPECIALIST

## 2022-06-08 PROCEDURE — 1111F DSCHRG MED/CURRENT MED MERGE: CPT | Performed by: CLINICAL NURSE SPECIALIST

## 2022-06-08 PROCEDURE — 1123F ACP DISCUSS/DSCN MKR DOCD: CPT | Performed by: CLINICAL NURSE SPECIALIST

## 2022-06-08 PROCEDURE — 1036F TOBACCO NON-USER: CPT | Performed by: CLINICAL NURSE SPECIALIST

## 2022-06-08 RX ORDER — GLIPIZIDE 5 MG/1
5 TABLET, FILM COATED, EXTENDED RELEASE ORAL DAILY
COMMUNITY

## 2022-06-08 NOTE — PATIENT INSTRUCTIONS
Pacemaker will send in 3 mos 9-8-22  Daily weights- if you gain 2-3 lbs over night or 5-6 lbs in a week take an extra diuretic   Follow up in Nov With Dr. Ziyad Chappell   Call with any questions or concerns  Follow up with Sheela Palencia MD for non cardiac problems  Report any new problems  Cardiovascular Fitness-Exercise as tolerated. Strive for 30 minutes of exercise most days of the week. Cardiac / Healthy Diet- low salt   Continue current medications as directed  Continue plan of treatment  It is always recommended that you bring your medications bottles with you to each visit - this is for your safety!

## 2022-06-08 NOTE — PROGRESS NOTES
[facility] [place]  [facility] [address]  [address]  Phone: [phone]  Fax: [phone]    OFFICE VISIT:  6/8/2022    [address] [place] [address] Dr: 1940    Reason For Visit:  Veena Amin is a [de-identified] y.o. 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 He had shortness of breath and fluid retention. He was given IV Lasix with diuresis. Patient underwent 2D echo that shows normal LV size and function with EF 50 to 55%. Mild aortic stenosis and mild MR. Patient was sent home on medical management. On 5/28/2022 patient presented to the emergency room via EMS with hypoglycemia. Glucose of 31. Also had acute on chronic CKD. His hemoglobin A1c was 5.7. Recommended stopping his oral diabetic medications and following up with primary care    He returns today for follow-up  Saw PCP today and started back on his oral medications for diabetes at lower dose  He states that he was out of his diuretic for 10 days when he got fluid overloaded and up in the hospital the first time. He is feeling better. Weight has been stable. Peripheral edema stable. Has GREGORIO and compliant with CPAP and oxygen at night      Subjective  Veena Amin denies exertional chest pain, shortness of breath, orthopnea, paroxysmal nocturnal dyspnea, syncope, presyncope, arrhythmia, and fatigue. The patient denies numbness or weakness to suggest cerebrovascular accident or transient ischemic attack. Idania Blas MD is PCP and follows labs.   Ema Walls has the following history as recorded in [facility]:    Patient Active Problem List    Diagnosis Date Noted    Long term current use of antiarrhythmic drug 04/21/2022    Nonsustained ventricular tachycardia (HCC)     Acute renal failure (ARF) ([address] 05/29/2022    Oxygen dependent 05/26/2022    Acute on chronic systolic (congestive) heart failure ([address] 05/24/2022    Back pain 05/24/2022    Carotid arterial disease ([address] 05/24/2022    Epigastric discomfort 05/24/2022    Hyperlipidemia 05/24/2022    PVD (peripheral vascular disease) ([address] 05/24/2022    Tremor 05/24/2022    Urge incontinence of urine 05/24/2022    Chronic obstructive pulmonary disease with (acute) exacerbation ([address] 11/24/2021    Acute decompensated heart failure ([address] 11/23/2021    Hypoglycemia 08/25/2021    Acute kidney injury superimposed on CKD ([address] 08/25/2021    Constipation 08/25/2021    GREGORIO treated with BiPAP 04/13/2021    Chronic diastolic congestive heart failure ([address] 04/13/2021    SOB (shortness of breath) 04/13/2021    Class 1 obesity in adult 03/25/2021    Atrial fibrillation ([address] 03/16/2021    Vitamin D deficiency 03/16/2021    Anemia 03/16/2021    Alcohol use 03/16/2021    Pacemaker 03/16/2021    Recurrent pneumonia 03/06/2021    COPD with acute lower respiratory infection ([address] 02/24/2021    Chronic kidney disease 02/24/2021    Chronic midline low back pain without sciatica 01/22/2021    Acute respiratory failure ([address] 01/21/2021    Type 2 diabetes mellitus with complication, without long-term current use of insulin ([address] 01/21/2021    Weakness 01/21/2021    Other dysphagia 01/21/2021    Severe sepsis ([address] 01/18/2021    Chronic respiratory failure with hypoxia and hypercapnia ([address] 01/18/2021    Acute on chronic kidney failure ([address] 01/18/2021    Hypoxemia 40/72/8995    Metabolic acidosis 19/57/0197    Acidosis, metabolic, with respiratory acidosis 01/18/2021    History of bladder cancer 06/15/2020    Bladder cancer ([facility] 12/19/2018    Postoperative pain 12/19/2018    Epistaxis 11/06/2018    Acute blood loss anemia 11/06/2018    Melena 11/06/2018    Thrombocytopenia ([address] 11/06/2018    Hypocalcemia 11/06/2018    Pneumonia due to infectious organism 11/06/2018    Bleeding diathesis ([address] 11/05/2018    Benign prostatic hyperplasia with lower urinary tract symptoms     Acute renal failure ([address]     Shock liver     Acute liver failure without hepatic coma 10/23/2018    CHF (congestive heart failure) ([address] 10/23/2018    Bilateral pleural effusion 10/23/2018    Palliative care patient 10/23/2018    Arthritis of knee 10/15/2018    Essential hypertension 10/15/2018    Pure hypercholesterolemia 10/15/2018    Slow transit constipation 10/15/2018    Iron deficiency anemia 10/15/2018    GERD (gastroesophageal reflux disease) 10/15/2018    Primary osteoarthritis of left knee 10/14/2018    Carotid stenosis, asymptomatic, left 08/28/2018    Bilateral carotid artery stenosis 06/25/2018    Atherosclerosis of native artery of both lower extremities with intermittent claudication ([address] 06/25/2018    Colonic diverticular abscess 08/01/2017    Generalized abdominal pain     Diverticulitis of large intestine with perforation without bleeding 06/04/2017     Past Medical History:   Diagnosis Date    Acute liver failure without hepatic coma 10/23/2018    Back pain     \"with tired legs as a result\"    Bladder cancer ([address] 12/19/2018    Blood circulation, collateral     Carotid arterial disease (HCC)     recent surgery    CKD (chronic kidney disease), 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/2018    Constipation     COPD with acute lower respiratory infection ([address] 02/24/2021    Epigastric discomfort     GERD (gastroesophageal reflux disease)     Hyperlipidemia     Hypertension     Hypertension     Pacemaker     Palliative care patient 10/23/2018    Pneumonia due to infectious organism 11/06/2018    Primary osteoarthritis of left knee 10/14/2018    PVD (peripheral vascular disease) (HCC)     Tremor     Tremor on Right side x 1-2 weeks per stepdaughter    Type 2 diabetes mellitus with complication, without long-term current use of insulin ([facility] 01/21/2021     Past Surgical History:   Procedure Laterality Date    BACK SURGERY      CARDIAC CATHETERIZATION  03/23/2021    100% RCA    COLONOSCOPY  2007?  CYSTOSCOPY Bilateral 12/19/2018    CYSTOSCOPY, BIOSPY FULGURATION OF BLADDER TUMOR POSSIBLE TURBT, RETROGRADE PYELOGRAM performed by Megan Salcedo MD at [address] COLONOSCOPY FLX DX W/COLLJ SPEC WHEN PFRMD N/A 09/11/2017    Dr Hernandez Em internal hemorrhoids, diverticular disease-HP-No recall (age)   [facility] ME REVISE MEDIAN N/CARPAL TUNNEL SURG Left 07/18/2018    OPEN CARPAL TUNNEL RELEASE performed by Mahin Watts MD at [address] Left 08/28/2018    LEFT CAROTID ENDARTERECTOMY WITH VEIN PATCH ANGIOPLASTY AND COMPLETION ANGIOGRAM performed by Desi Hancock MD at [address] Left 10/15/2018    LEFT COMPLEX TOTAL KNEE ARTHROPLASTY performed by Mahin Watts MD at [address] ENDOSCOPY N/A 11/18/2021    Dr Cindia Schlatter, Sub prep lg amount of solid and semisolid food/Medication in stomach body/antrum/pylorus, stomach lumen appeared to distended w air insufflation and collapse upon suction,   inadequate exam sugg of gastroparesis, repeat in 10-14 days    UPPER GASTROINTESTINAL ENDOSCOPY N/A 01/06/2022    Dr Cindia Schlatter, distal stomach body likely GIST nodule-Submucosal fundic body gastric mucosa, Benign, (-)Hpylori, (-)Sprue    UPPER GASTROINTESTINAL ENDOSCOPY N/A 03/07/2022    Dr Cindia Schlatter, EUS submucosal gastric nodule, otherwise normal, 1 year recall    UPPER GASTROINTESTINAL ENDOSCOPY N/A 03/07/2022    Dr Cindia Schlatter, W EUS,  nodular area at end of rugal gold in distal stomach body    VASCULAR SURGERY  04/21/2015    Aditya BESS Ultrasound guided access of left common femoral artery. Aortogram.Diagnostic right lower extremity arteriogram.Radiologic supervision and interpretation.  VASCULAR SURGERY  2015    Dante Nathan M.D Atherectomy,angioplasty,and stenting of left superficial femoral artery.  VASCULAR SURGERY  2014    Dante Nathan M.D. Ultrasound-guided access of right common femoral artery. Aortogram.Left lower extremity arteriogram.Atherectomy and angioplasty of left superficial femoral artery. Radiologic supervision and interpretation.  VASCULAR SURGERY  2013    Dante BESS Aortogram.Multistation arteriogram right lower extremity. Laser atherectomy and angioplasty of right superficial femoral artery. Selective catheterization of right tibioperoneal trunk. Angioplasty of peroneal artery and tibioperoneal trunk.  VASCULAR SURGERY  10/30/2018    [facility]. Ultrasound guided cannulation of right internal vein. Placement of right internal jugular vein tunneled dialysis catheter bard equistream xk 23cm tip to cuff    VASCULAR SURGERY  2018    [facility]. Removal of tunneled dilaysis catheter right internal jugular vein. Family History   Problem Relation Age of Onset    Colon Cancer Father     Diabetes Brother     Colon Polyps Neg Hx     Liver Cancer Neg Hx     Liver Disease Neg Hx     Esophageal Cancer Neg Hx     Rectal Cancer Neg Hx     Stomach Cancer Neg Hx      Social History     Tobacco Use    Smoking status: Former Smoker     Packs/day: 2.00     Years: 48.00     Pack years: 96.00     Types: Cigarettes     Quit date: 6/3/2003     Years since quittin.0    Smokeless tobacco: Never Used   Substance Use Topics    Alcohol use:  Yes     Alcohol/week: 12.0 standard drinks     Types: 12 Glasses of wine per week     Comment: 1 glasses of wine every night      Current Outpatient Medications   Medication Sig Dispense Refill    glipiZIDE (GLUCOTROL XL) 5 MG extended release tablet Take 5 mg by mouth daily Takes a half a day      bumetanide (BUMEX) 1 MG tablet Take 2 tablets by mouth daily 180 tablet 0  metOLazone (ZAROXOLYN) 2.5 MG tablet Take 1 tablet by mouth daily (Patient taking differently: Take 2.5 mg by mouth daily as needed ) 90 tablet 0    mirabegron (MYRBETRIQ) 25 MG TB24 Take 1 tablet by mouth daily 90 tablet 3    amiodarone (CORDARONE) 200 MG tablet TAKE 1 TABLET BY MOUTH DAILY 30 tablet 3    TRULANCE 3 MG TABS TAKE 1 TABLET BY MOUTH DAILY 30 tablet 5    UNABLE TO FIND Take 20 mg by mouth daily trosopium       tamsulosin (FLOMAX) 0.4 MG capsule Take 2 capsules by mouth daily 60 capsule 11    SENNA PLUS 8.6-50 MG per tablet       atorvastatin (LIPITOR) 20 MG tablet Take 1 tablet by mouth daily 30 tablet 0    metoprolol succinate (TOPROL XL) 100 MG extended release tablet Take 1 tablet by mouth daily 30 tablet 3    NIFEdipine (ADALAT CC) 60 MG extended release tablet Take 1 tablet by mouth daily 30 tablet 3    folic acid (FOLVITE) 1 MG tablet Take 1 tablet by mouth daily 30 tablet 3    vitamin D (ERGOCALCIFEROL) 1.25 MG (17012 UT) CAPS capsule Take 1 capsule by mouth once a week (Patient taking differently: Take 50,000 Units by mouth once a week Mondays) 5 capsule 0    OXYGEN Inhale 2 L into the lungs continuous 1 Container 5    pantoprazole (PROTONIX) 20 MG tablet Take 40 mg by mouth daily  (Patient not taking: Reported on 6/8/2022)      aspirin 81 MG EC tablet Take 1 tablet by mouth daily (Patient not taking: Reported on 5/24/2022) 30 tablet 3    ferrous sulfate (IRON 325) 325 (65 Fe) MG tablet Take 1 tablet by mouth 2 times daily (with meals) (Patient not taking: Reported on 5/24/2022) 30 tablet 3     No current facility-administered medications for this visit. Allergies: Eliquis [apixaban] and Promethazine hcl    Review of Systems  Constitutional  no significant activity change, appetite change, or unexpected weight change. No fever, chills or diaphoresis. No fatigue. HEENT  no significant rhinorrhea or epistaxis. No tinnitus or significant hearing loss.    Eyes  no sudden vision change or amaurosis. Respiratory  no significant wheezing, stridor, apnea or cough. + dyspnea on exertion no resting shortness of breath. + GREGORIO on CPAP and oxygen at night  Cardiovascular  no exertional chest pain, orthopnea or PND. No sensation of arrhythmia or slow heart rate. No claudication +leg edema. Gastrointestinal  no abdominal swelling or pain. No blood in stool. No severe constipation, diarrhea, nausea, or vomiting. Genitourinary  no difficulty urinating, dysuria, frequency, or urgency. No flank pain or hematuria. Musculoskeletal  no back pain, gait disturbance, or myalgia. Skin  no color change or rash. No pallor. No new surgical incision. + Abrasion on right knee and left lower leg  Neurologic  no speech difficulty, facial asymmetry or lateralizing weakness. No seizures, presyncope, syncope, or significant dizziness. Hematologic  no easy bruising or excessive bleeding. Psychiatric  no severe anxiety or insomnia. No confusion. All other review of systems are negative. Objective  Vital Signs - /78   Pulse 60   Ht 6' (1.829 m)   Wt 220 lb (99.8 kg)   BMI 29.84 kg/m²   General - Asa Lot is alert, cooperative, and pleasant. Well groomed. No acute distress. Body habitus is overweight. HEENT  The head is normocephalic. No circumoral cyanosis. Dentition is normal.   EYES -  No Xanthelasma, no arcus senilis, no conjunctival hemorrhages or discharge. Neck - Supple, without increased jugular venous pressures. No carotid bruits. No mass. Respiratory - Lungs are clear bilaterally. No wheezes or rales. Normal effort without use of accessory muscles. Cardiovascular  Heart has regular rhythm and rate. No murmurs, rubs or gallops. + pedal pulses and no varicosities. Abdominal -  Soft, nontender, nondistended. Bowel sounds are intact. Extremities - No clubbing, cyanosis, + 2+ BLE  edema. Musculoskeletal -  No clubbing .   No Osler's nodes.   Gait normal .  No kyphosis or scoliosis. Skin -  no statis ulcers or dermatitis. Abrasion on right knee and left lower leg-dried blood noted  Neurological - No focal signs are identified. Oriented to person, place and time. Psychiatric -  Appropriate affect and mood. Assessment:     Diagnosis Orders   1. Chronic diastolic congestive heart failure ([facility]     2. Pacemaker     3. Essential hypertension     4. Nonsustained ventricular tachycardia ([facility]     5. Coronary artery disease involving native coronary artery of native heart without angina pectoris       Data:  BP Readings from Last 3 Encounters:   06/08/22 138/78   06/01/22 122/60   05/31/22 (!) 139/57    Pulse Readings from Last 3 Encounters:   06/08/22 60   06/01/22 60   05/31/22 62        Wt Readings from Last 3 Encounters:   06/08/22 220 lb (99.8 kg)   05/30/22 212 lb 12.8 oz (96.5 kg)   05/25/22 222 lb 2 oz (100.8 kg)      Reviewed  recent notes   Reviewed recent labs  Last BNP was 2009 down from 4500 when he was admitted for CHF  Lab Results   Component Value Date     05/31/2022    K 3.9 05/31/2022     05/31/2022    CO2 24 05/31/2022    BUN 52 (H) 05/31/2022    CREATININE 2.9 (H) 05/31/2022    GLUCOSE 173 (H) 05/31/2022    CALCIUM 8.6 (L) 05/31/2022    PROT 5.4 (L) 05/31/2022    LABALBU 3.5 05/31/2022    BILITOT 0.4 05/31/2022    ALKPHOS 99 05/31/2022    AST 18 05/31/2022    ALT 32 05/31/2022    LABGLOM 21 (A) 05/31/2022    GFRAA 25 (L) 05/31/2022     Same compliant with CPAP and oxygen. We discussed signs and symptoms report. He appears euvolemic. We discussed daily weights and when and how to take extra diuretic. Has upcoming follow-up with his nephrologist.   States taking medications as prescribed  Stable cardiovascular status. No evidence of overt heart failure, angina or dysrhythmia. 30 minutes were spent preparing, reviewing and seeing patient.   All questions answered    Plan  Pacemaker will send in 3 mos 9-8-22  Daily weights- if you gain 2-3 lbs over night or 5-6 lbs in a week take an extra diuretic   Follow up in Nov With Dr. Alo Noriega   Call with any questions or concerns  Follow up with Kelly Lindsey MD for non cardiac problems  Report any new problems  Cardiovascular Fitness-Exercise as tolerated. Strive for 30 minutes of exercise most days of the week. Cardiac / Healthy Diet- low salt   Continue current medications as directed  Continue plan of treatment  It is always recommended that you bring your medications bottles with you to each visit - this is for your safety! ELISABETH Ford    EMR dragon/transcription disclaimer: Much of this encounter note is electronic transcription/translation of spoken language to printed tach. Electronic translation of spoken language may be erroneous, or at times, nonsensical words or phrases may be inadvertently transcribed.  Although, I have reviewed the note for such errors, some may still exist.

## 2022-07-06 ENCOUNTER — PROCEDURE VISIT (OUTPATIENT)
Dept: UROLOGY | Age: 82
End: 2022-07-06
Payer: MEDICARE

## 2022-07-06 VITALS — HEIGHT: 72 IN | WEIGHT: 219 LBS | BODY MASS INDEX: 29.66 KG/M2 | TEMPERATURE: 98.8 F

## 2022-07-06 DIAGNOSIS — N39.41 URGE INCONTINENCE: ICD-10-CM

## 2022-07-06 DIAGNOSIS — Z85.51 HISTORY OF BLADDER CANCER: Primary | ICD-10-CM

## 2022-07-06 DIAGNOSIS — N40.1 BENIGN PROSTATIC HYPERPLASIA WITH URINARY FREQUENCY: ICD-10-CM

## 2022-07-06 DIAGNOSIS — R35.0 BENIGN PROSTATIC HYPERPLASIA WITH URINARY FREQUENCY: ICD-10-CM

## 2022-07-06 LAB
BILIRUBIN, POC: NORMAL
BLOOD URINE, POC: NORMAL
CLARITY, POC: CLEAR
COLOR, POC: YELLOW
GLUCOSE URINE, POC: NORMAL
KETONES, POC: NORMAL
LEUKOCYTE EST, POC: NORMAL
NITRITE, POC: NORMAL
PH, POC: 5.5
PROTEIN, POC: NORMAL
SPECIFIC GRAVITY, POC: 1.01
UROBILINOGEN, POC: 0.2

## 2022-07-06 PROCEDURE — 51798 US URINE CAPACITY MEASURE: CPT | Performed by: UROLOGY

## 2022-07-06 PROCEDURE — 81003 URINALYSIS AUTO W/O SCOPE: CPT | Performed by: UROLOGY

## 2022-07-06 PROCEDURE — 52000 CYSTOURETHROSCOPY: CPT | Performed by: UROLOGY

## 2022-07-06 NOTE — PROGRESS NOTES
BLADDER CANCER  Patient was first diagnosed with bladder cancer 3.5 year(s) ago. Last Recurrence: His initial diagnosis was December 2018 he has not had a recurrence since that time. 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 None  Last upper tract study: 2 year(s) ago. Study was a CT done 7/31/2020: Renal ultrasound on 8/26/2021 showed right renal cyst.  He has CKD followed by nephrology      Patient here for routine bladder cancer surveillance. He also has overactive bladder with urgency urge incontinence and BPH. He is on tamsulosin and Myrbetriq. He had tried adding trospium in the past but this resulted in urinary retention and constipation. He is urgency is confounded by limited mobility and have him take Bumex for fluid retention. Cystoscopy Operative Note (7/6/22)  Surgeon: Veronica Butts MD  Anesthesia: Urethral 2% Xylocaine   Indications: History of bladder cancer  Position: Supine    Findings:   The patient was prepped and draped in the usual sterile fashion. The flexible cystoscope was advanced through the urethra and into the bladder under direct vision. The bladder was thoroughly inspected and the following was noted:    Residual Urine: mild  Urethra: normal appearing urethra with no masses, tenderness or lesions  Prostate: No evidence of obstructing lateral lobes of prostate prostate widely open; median lobe present? no.    Bladder: No tumors or CIS noted. No bladder diverticulum. There was mild trabeculation noted. Recurrent papillary tumor seen  Ureters: Clear efflux from both ureters. Orifices with normal configuration and location. The cystoscope was removed. The patient tolerated the procedure well. The patient was given a post procedure instructions and follow up.     Results for orders placed or performed in visit on 07/06/22   POCT Urinalysis No Micro (Auto)   Result Value Ref Range    Color, UA yellow     Clarity, UA clear     Glucose, UA POC -     Bilirubin, UA -     Ketones, UA -     Spec Grav, UA 1.010     Blood, UA POC -     pH, UA 5.5     Protein, UA POC -     Urobilinogen, UA 0.2     Leukocytes, UA trace     Nitrite, UA -            1. History of bladder cancer  Cystoscopy today shows no recurrence she will follow-up in 6 months for routine surveillance. - Cystoscopy  - POCT Urinalysis No Micro (Auto)  - Cystoscopy; Future    2. Benign prostatic hyperplasia with urinary frequency  He continues also complain of urgency. Continue tamsulosin he does not appear by cystoscopy to have obstruction as a wide open prostate.  - CA Measure, post-void residual, US, non-imaging    3. Urge incontinence  Continue Myrbetriq. Orders Placed This Encounter   Procedures    POCT Urinalysis No Micro (Auto)    CA Measure, post-void residual, US, non-imaging    Cystoscopy    Cystoscopy     Cystoscopy in 6 months     Standing Status:   Future     Standing Expiration Date:   7/6/2023     Scheduling Instructions:      Cystoscopy in 6 months       Return in about 6 months (around 1/6/2023) for Cystoscopy on next visit.         Patricia Powell MD

## 2022-07-06 NOTE — LETTER
[facility]  [place] [address]  [address] [facility] [place] [address]  Phone: [phone]  Fax: [phone]    Jolie Hernandez MD    July 6, 2022     Kyrie Bourgeois MD  [address] [place] [address] [address] [address]    Patient: Alejandro Goldsmith   MR Number: [id number]   YOB: 1940   Date of Visit: 7/6/2022       Dear Kyrie Bourgeois: Thank you for referring Ivy Client to me for evaluation/treatment. Below are the relevant portions of my assessment and plan of care. If you have questions, please do not hesitate to call me. I look forward to following [address] [place] [address] along with you.     Sincerely,      Jolie Hernandez MD

## 2022-07-07 ENCOUNTER — TELEPHONE (OUTPATIENT)
Dept: PALLATIVE CARE | Age: 82
End: 2022-07-07

## 2022-07-07 NOTE — TELEPHONE ENCOUNTER
I left a VM for the patient to call [facility] back or I will try to call him back at a later date. I can be reached at [phone].

## 2022-07-13 ENCOUNTER — OFFICE VISIT (OUTPATIENT)
Dept: PULMONOLOGY | Age: 82
End: 2022-07-13
Payer: MEDICARE

## 2022-07-13 VITALS
WEIGHT: 220.6 LBS | HEART RATE: 60 BPM | BODY MASS INDEX: 29.88 KG/M2 | OXYGEN SATURATION: 98 % | DIASTOLIC BLOOD PRESSURE: 70 MMHG | HEIGHT: 72 IN | SYSTOLIC BLOOD PRESSURE: 126 MMHG

## 2022-07-13 DIAGNOSIS — I50.32 CHRONIC DIASTOLIC CONGESTIVE HEART FAILURE (HCC): ICD-10-CM

## 2022-07-13 DIAGNOSIS — J96.11 CHRONIC RESPIRATORY FAILURE WITH HYPOXIA AND HYPERCAPNIA (HCC): Primary | ICD-10-CM

## 2022-07-13 DIAGNOSIS — J96.12 CHRONIC RESPIRATORY FAILURE WITH HYPOXIA AND HYPERCAPNIA (HCC): Primary | ICD-10-CM

## 2022-07-13 DIAGNOSIS — R06.02 SOB (SHORTNESS OF BREATH): ICD-10-CM

## 2022-07-13 DIAGNOSIS — J44.1 CHRONIC OBSTRUCTIVE PULMONARY DISEASE WITH (ACUTE) EXACERBATION (HCC): ICD-10-CM

## 2022-07-13 DIAGNOSIS — G47.33 OSA TREATED WITH BIPAP: ICD-10-CM

## 2022-07-13 PROCEDURE — G8417 CALC BMI ABV UP PARAM F/U: HCPCS | Performed by: INTERNAL MEDICINE

## 2022-07-13 PROCEDURE — 1123F ACP DISCUSS/DSCN MKR DOCD: CPT | Performed by: INTERNAL MEDICINE

## 2022-07-13 PROCEDURE — 1036F TOBACCO NON-USER: CPT | Performed by: INTERNAL MEDICINE

## 2022-07-13 PROCEDURE — 3023F SPIROM DOC REV: CPT | Performed by: INTERNAL MEDICINE

## 2022-07-13 PROCEDURE — 99214 OFFICE O/P EST MOD 30 MIN: CPT | Performed by: INTERNAL MEDICINE

## 2022-07-13 PROCEDURE — G8427 DOCREV CUR MEDS BY ELIG CLIN: HCPCS | Performed by: INTERNAL MEDICINE

## 2022-07-13 ASSESSMENT — ENCOUNTER SYMPTOMS
APNEA: 0
ANAL BLEEDING: 0
ABDOMINAL DISTENTION: 0
BACK PAIN: 0
ABDOMINAL PAIN: 0
CHEST TIGHTNESS: 1
SHORTNESS OF BREATH: 1
COUGH: 0
RHINORRHEA: 0
WHEEZING: 0

## 2022-07-13 NOTE — PROGRESS NOTES
Pulmonary and Sleep Medicine    Yenifer Dhillon (:  1940) is a [de-identified] y.o. male,Established patient, here for evaluation of the following chief complaint(s):  Follow-up (Pt came in today for a follow up. No download from DME.)      Referring physician:  No referring provider defined for this encounter. ASSESSMENT/PLAN:  1. Chronic respiratory failure with hypoxia and hypercapnia ([address] on oxygen and bipap  2. Chronic obstructive pulmonary disease with (acute) exacerbation (HCC)  3. GREGORIO treated with BiPAP  4. SOB (shortness of breath)  5. Chronic diastolic congestive heart failure ([address]        Continue current management for chronic respiratory failure. Continue noninvasive ventilation for obstructive sleep apnea. Discussed salt and fluid restrictions again. Chapis Alva MD, [facility], [facility]    Return in about 6 months (around 2023). SUBJECTIVE/OBJECTIVE:  The patient is here for follow-up on chronic respiratory failure and heart failure. He has been on noninvasive ventilation during sleep. He feels it is helping him. He has not had any hospitalizations recently. He follows with nephrology. He is on diuretics. Recent chest x-ray shows pulmonary edema. However he has been watching his salt intake and monitoring his weight and swelling in his legs and using as needed augmented diuresis      Prior to Visit Medications    Medication Sig Taking?  Authorizing Provider   glipiZIDE (GLUCOTROL XL) 5 MG extended release tablet Take 5 mg by mouth daily Takes a half a day Yes Historical Provider, MD   bumetanide (BUMEX) 1 MG tablet Take 2 tablets by mouth daily Yes Antonio Perez MD   metOLazone (ZAROXOLYN) 2.5 MG tablet Take 1 tablet by mouth daily  Patient taking differently: Take 2.5 mg by mouth daily as needed  Yes Antonio Perez MD   mirabegron (MYRBETRIQ) 25 MG TB24 Take 1 tablet by mouth daily Yes ELISABETH Ardon - CNP   amiodarone (CORDARONE) 200 MG tablet TAKE 1 TABLET BY MOUTH DAILY Yes ELISABETH Edmond   TRULANCE 3 MG TABS TAKE 1 TABLET BY MOUTH DAILY Yes ELISABETH Francisco - NP   UNABLE TO FIND Take 20 mg by mouth daily trosopium  Yes Historical Provider, MD   tamsulosin (FLOMAX) 0.4 MG capsule Take 2 capsules by mouth daily Yes Ileana Fuentes MD   pantoprazole (PROTONIX) 20 MG tablet Take 40 mg by mouth daily  Yes Historical Provider, MD   SENNA PLUS 8.6-50 MG per tablet  Yes Historical Provider, MD   aspirin 81 MG EC tablet Take 1 tablet by mouth daily Yes Flip Chong MD   atorvastatin (LIPITOR) 20 MG tablet Take 1 tablet by mouth daily Yes Flip Chong MD   metoprolol succinate (TOPROL XL) 100 MG extended release tablet Take 1 tablet by mouth daily Yes Flip Chong MD   NIFEdipine (ADALAT CC) 60 MG extended release tablet Take 1 tablet by mouth daily Yes Flip Chong MD   ferrous sulfate (IRON 325) 325 (65 Fe) MG tablet Take 1 tablet by mouth 2 times daily (with meals) Yes Flip Chong MD   folic acid (FOLVITE) 1 MG tablet Take 1 tablet by mouth daily Yes Flip Chong MD   vitamin D (ERGOCALCIFEROL) 1.25 MG (71315 UT) CAPS capsule Take 1 capsule by mouth once a week  Patient taking differently: Take 50,000 Units by mouth once a week Mondays Yes Flip Chong MD   OXYGEN Inhale 2 L into the lungs continuous Yes Flip Chong MD        Review of Systems   Constitutional: Negative for activity change, appetite change, chills, diaphoresis and fatigue. HENT: Negative for congestion, dental problem, drooling, ear discharge, postnasal drip and rhinorrhea. Eyes: Negative for visual disturbance. Respiratory: Positive for chest tightness and shortness of breath. Negative for apnea, cough and wheezing. Gastrointestinal: Negative for abdominal distention, abdominal pain and anal bleeding. Endocrine: Negative for cold intolerance, heat intolerance and polydipsia. Genitourinary: Negative for difficulty urinating, dysuria, enuresis and flank pain. Musculoskeletal: Negative for arthralgias, back pain and gait problem. Allergic/Immunologic: Negative for environmental allergies. Neurological: Negative for dizziness, facial asymmetry, light-headedness and headaches. Vitals:    07/13/22 1103   BP: 126/70   Pulse: 60   SpO2: 98%     BMI Readings from Last 1 Encounters:   07/13/22 29.92 kg/m²         Physical Exam  Vitals reviewed. Constitutional:       Appearance: Normal appearance. HENT:      Head: Normocephalic and atraumatic. Nose: Nose normal.   Eyes:      Extraocular Movements: Extraocular movements intact. Conjunctiva/sclera: Conjunctivae normal.   Cardiovascular:      Rate and Rhythm: Normal rate and regular rhythm. Heart sounds: No murmur heard. No friction rub. Pulmonary:      Effort: Pulmonary effort is normal. No respiratory distress. Breath sounds: Normal breath sounds. No stridor. No wheezing, rhonchi or rales. Abdominal:      General: There is no distension. Palpations: There is no mass. Tenderness: There is no abdominal tenderness. There is no guarding or rebound. Musculoskeletal:      Cervical back: Normal range of motion and neck supple. Neurological:      Mental Status: He is alert and oriented to person, place, and time. This note was generated used a voice recognition software. Errors in voice recognition may have occurred. An electronic signature was used to authenticate this note.     --Ange Mcgowan MD

## 2022-07-26 DIAGNOSIS — I49.5 SA NODE DYSFUNCTION (HCC): ICD-10-CM

## 2022-07-26 DIAGNOSIS — I47.29 NONSUSTAINED VENTRICULAR TACHYCARDIA: ICD-10-CM

## 2022-07-26 DIAGNOSIS — I50.32 CHRONIC DIASTOLIC CONGESTIVE HEART FAILURE (HCC): ICD-10-CM

## 2022-07-26 DIAGNOSIS — Z95.0 PACEMAKER: Primary | ICD-10-CM

## 2022-08-03 ENCOUNTER — OFFICE VISIT (OUTPATIENT)
Dept: PALLATIVE CARE | Age: 82
End: 2022-08-03
Payer: MEDICARE

## 2022-08-03 DIAGNOSIS — I50.32 CHRONIC DIASTOLIC CONGESTIVE HEART FAILURE (HCC): ICD-10-CM

## 2022-08-03 DIAGNOSIS — J96.11 CHRONIC RESPIRATORY FAILURE WITH HYPOXIA AND HYPERCAPNIA (HCC): ICD-10-CM

## 2022-08-03 DIAGNOSIS — G89.29 CHRONIC MIDLINE LOW BACK PAIN WITHOUT SCIATICA: ICD-10-CM

## 2022-08-03 DIAGNOSIS — R53.81 PHYSICAL DECONDITIONING: Primary | ICD-10-CM

## 2022-08-03 DIAGNOSIS — J44.9 CHRONIC OBSTRUCTIVE PULMONARY DISEASE, UNSPECIFIED COPD TYPE (HCC): ICD-10-CM

## 2022-08-03 DIAGNOSIS — E11.8 TYPE 2 DIABETES MELLITUS WITH COMPLICATION, WITHOUT LONG-TERM CURRENT USE OF INSULIN (HCC): ICD-10-CM

## 2022-08-03 DIAGNOSIS — J96.12 CHRONIC RESPIRATORY FAILURE WITH HYPOXIA AND HYPERCAPNIA (HCC): ICD-10-CM

## 2022-08-03 DIAGNOSIS — Z51.5 ENCOUNTER FOR PALLIATIVE CARE: ICD-10-CM

## 2022-08-03 DIAGNOSIS — M54.50 CHRONIC MIDLINE LOW BACK PAIN WITHOUT SCIATICA: ICD-10-CM

## 2022-08-03 PROCEDURE — 3044F HG A1C LEVEL LT 7.0%: CPT | Performed by: NURSE PRACTITIONER

## 2022-08-03 PROCEDURE — 1123F ACP DISCUSS/DSCN MKR DOCD: CPT | Performed by: NURSE PRACTITIONER

## 2022-08-03 PROCEDURE — G8417 CALC BMI ABV UP PARAM F/U: HCPCS | Performed by: NURSE PRACTITIONER

## 2022-08-03 PROCEDURE — 1036F TOBACCO NON-USER: CPT | Performed by: NURSE PRACTITIONER

## 2022-08-03 PROCEDURE — 99349 HOME/RES VST EST MOD MDM 40: CPT | Performed by: NURSE PRACTITIONER

## 2022-08-03 NOTE — PROGRESS NOTES
Supportive Care/Community Based Palliative Care  Follow Up Note        Patient Name:  Ashlie Husain Record Number:  [id number]  YOB: 1940    Date of Visit: 8/3/2022  Location of Visit:  Home    Patient Care Team:  Dr. Mary Johnson - PCP  Dr. Antwan Swartz - pulmonology  Dr. Brian Delarosa - cardiology  Dr. Jasen Garces - urology  ELISABETH Liao- nephrology  [address] Hematology     History obtained from:  patient, spouse, electronic medical record    PALLIATIVE DIAGNOSES AND ORDERS/RECOMMENDATIONS/PLAN:     Physical deconditioning  Some improvement in activity tolerance  Has previously been given HEP per PT; encouraged him to do these exercises  Use of Rolator when needed. Chronic diastolic congestive heart failure ([address]  Currently appears well compensated  Daily weights, extra diuretic if weight gain 2-3lb overnight or 5-6 lbs in a week as per cardiology  Follow up with cardiology and nephroogy as planned. Chronic obstructive pulmonary disease, unspecified COPD type ([address]  Chronic respiratory failure with hypoxia and hypercapnia (HCC)  Continue Trilogy with O2 at bedtime and when napping    Type 2 diabetes mellitus with complication, without long-term current use of insulin (HCC)  No recent issues with hypoglycemia  Follow up with Dr. Chin Holman as scheduled. Chronic midline low back pain without sciatica  Discussed trying OTC lidocaine patch when he's going to be up moving around more.      Encounter for palliative care  Current goals of care include: Preserve independence/control/autonomy, Maintain/Improve function quality of life, Remain at home, Would want hospitalization if needed  Will continue goals of care discussions based on clinical course  Follow up home visit in 6-8 weeks and as needed    CHIEF COMPLAINT:     Chief Complaint   Patient presents with    Other     Urinary incontinence, chronic back pain, diminished hearing     CLINICAL SUMMARY:          Tahir Blue is a 81 y.o. male with PMH of COPD, CKD stage III, diabetes, HTN, GERD, hyperlipidemia, PVD,  CAD,  chronic back pain, osteoarthritis, diverticulitis, bladder cancer and more recently recurrent pneumonia, NSVT, sinus node dysfunction with prolonged pauses requiring pacemaker placement, chronic respiratory failure and now requiring Trilogy use at night. He has had four admissions between 1/17/2021 and 3/29/2021. These visits are summarized in initial consultation note dated 4/2/2021     [facility]25-8/28/2021 due to hypoglycemia after several days of not eating due to constipation and remaining on antidiabetic meds. Had ANA on CKD with creatinine 3.3 which improved with IV fluids. HgbA1C was 6.3 and home diabetic meds discontinued. 11/18/2021 to 11/30/2021 required admission including ICU stay intubated and on mechanical ventilation due to acute decompensated heart failure as well as respiratory distress. TTE completed revealing grade 2 diastolic dysfunction with preserved ejection fraction, mild mitral regurgitation. Required ED visit in December 2021 due to severe constipation with fecal impaction thought to be caused by trospium and this was continued. 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  EUS 3/7/2022: Small ovoid shaped gastric nodule which appeared to be involving only mucosa and submucosa and approximately 5 mL x 15 mm in size, benign-appearing, homogeneous, isoechoic. Musculus propria in this area. Centrally normal.  The gastric nodule may be representing mild thickening of gastric mucosa and semiclosed in this area based on his EUS appearance. Previous biopsies few weeks ago had revealed benign findings pertaining to the mucosa. No further biopsies done.   Recommendations repeat EGD with EUS in 1 year. Admitted to [facility] 5/24/2022 to 5/26/2022 due to acute exacerbation of congestive heart failure after being off of his diuretics for about a week. Diuresed intravenously with improvement in symptoms. Admitted to [facility] 5/28/2022 to 5/31/2022 due to symptomatic hypoglycemia. He had noted low blood sugar at home. He drank orange juice with added sugar. He had no improvement he called EMS and paramedics administered D50. After several hours blood sugar dropped again and he became very symptomatic with altered mental status, combativeness. [facility] and [facility] was transported to [facility]. He was started on IV drip with dextrose. Hemoglobin A1c was noted to be 5.7. His glipizide was discontinued. He was seen by cardiology and nephrology. 2D echo 5/25/2022: Mild mitral regurgitation, mild aortic stenosis with mean gradient 8 mg mercury, LV EF estimated at 50 to 55%    Cystoscopy 7/6/2022 per Dr. Cedrick Calvo with no evidence of recurrence and 6 month follow up cystoscopy recommended. HPI AND VISIT SUMMARY:     I saw Victor M Moncada in His home. Upon my arrival he was noted to be awake, alert, oriented, and in no acute distress sitting in recliner in living room with his feet elevated. He greeted me and we discussed interval history since his last visit. He has been taking his diuretic at 7am and 11pm.  He usually gets up three times a night to urinate. He has incontinence at times which is bothersome to him. He wears disposable briefs and we discussed adding a pad when he is going to be away from the house. He usually urinates three times during the night. Continues on Flomax daily. Previously he tried trospium which worked well but it was stopped due to it causing severe constipation. He continues on Bumex. His edema is much improved, and his leg wounds have healed. He has long term hearing loss.  He complains of his hearing aids not working well despite them being adjusted so he hasn't been wearing them. This is frustrating. He was told previously a hearing aid wouldn't help the left ear. He is going to try just the one in the right and see if he gets benefit. He has chronic back pain. Discussed trying OTC lidocaine patch when he's going to be up moving around more. Overall activity has increased. He has been out doing things in his garage some. He plans to participate in a car show later this month. Goals of care unchanged. He continues to want aggressive management including hospitalization as needed.     Listened to patient/family/caregiver concerns, Assessed family understanding and coping and Elicited patient's goals and values, and used these to establish or modify goals of care     ADVANCED CARE PLANNING:                                            Current Designated Health Care Decision Maker:     Primary Decision Maker: Elpidoi Edge - Child - [phone]    Secondary Decision Maker: Monalisa Number - Spouse - [phone]    Secondary Decision Maker: Toñito Downey - Child - [phone]    Durable Power of :no     Advanced Directives/Living Jung: yes  On file     Out of hospital medical orders in place to reflect resuscitation status: no       FUNCTIONAL ASSESSMENT:     Palliative Performance Scale:  60% Ambulation reduced; Significant disease; Can't do hobbies/housework; intake normal or reduced; occasional assist; LOC full/confusion    HISTORY:     Past medical history, surgical history, family history, social history unchanged    Current Medications:      Current Outpatient Medications:     glipiZIDE (GLUCOTROL XL) 5 MG extended release tablet, Take 5 mg by mouth daily Takes a half a day, Disp: , Rfl:     bumetanide (BUMEX) 1 MG tablet, Take 2 tablets by mouth daily, Disp: 180 tablet, Rfl: 0    metOLazone (ZAROXOLYN) 2.5 MG tablet, Take 1 tablet by mouth daily (Patient taking differently: Take 2.5 mg by mouth daily as needed ), Disp: 90 tablet, Rfl: 0    mirabegron (MYRBETRIQ) 25 MG TB24, Take 1 tablet by mouth daily, Disp: 90 tablet, Rfl: 3    amiodarone (CORDARONE) 200 MG tablet, TAKE 1 TABLET BY MOUTH DAILY, Disp: 30 tablet, Rfl: 3    TRULANCE 3 MG TABS, TAKE 1 TABLET BY MOUTH DAILY, Disp: 30 tablet, Rfl: 5    UNABLE TO FIND, Take 20 mg by mouth daily trosopium , Disp: , Rfl:     tamsulosin (FLOMAX) 0.4 MG capsule, Take 2 capsules by mouth daily, Disp: 60 capsule, Rfl: 11    pantoprazole (PROTONIX) 20 MG tablet, Take 40 mg by mouth daily , Disp: , Rfl:     SENNA PLUS 8.6-50 MG per tablet, , Disp: , Rfl:     aspirin 81 MG EC tablet, Take 1 tablet by mouth daily, Disp: 30 tablet, Rfl: 3    atorvastatin (LIPITOR) 20 MG tablet, Take 1 tablet by mouth daily, Disp: 30 tablet, Rfl: 0    metoprolol succinate (TOPROL XL) 100 MG extended release tablet, Take 1 tablet by mouth daily, Disp: 30 tablet, Rfl: 3    NIFEdipine (ADALAT CC) 60 MG extended release tablet, Take 1 tablet by mouth daily, Disp: 30 tablet, Rfl: 3    ferrous sulfate (IRON 325) 325 (65 Fe) MG tablet, Take 1 tablet by mouth 2 times daily (with meals) (Patient not taking: Reported on 8/3/2022), Disp: 30 tablet, Rfl: 3    folic acid (FOLVITE) 1 MG tablet, Take 1 tablet by mouth daily, Disp: 30 tablet, Rfl: 3    vitamin D (ERGOCALCIFEROL) 1.25 MG (93303 UT) CAPS capsule, Take 1 capsule by mouth once a week (Patient taking differently: Take 50,000 Units by mouth once a week Mondays), Disp: 5 capsule, Rfl: 0    OXYGEN, Inhale 2 L into the lungs continuous, Disp: 1 Container, Rfl: 5     Allergies:  Eliquis [apixaban] and Promethazine hcl    Review of Systems  Review of Systems   Constitutional:  Positive for activity change (improved some). HENT:  Positive for congestion (Nasal, chronic                         ) and hearing loss (chronic). Eyes: Negative. Respiratory:  Positive for shortness of breath (with exertion). Negative for wheezing. Cardiovascular:  Positive for leg swelling.  Negative for chest pain and palpitations (improved). Gastrointestinal:  Positive for constipation ( Currently controlled). Negative for abdominal distention and diarrhea. Genitourinary:         Incontinence, followed by urology   Musculoskeletal:  Positive for arthralgias and back pain ( Chronic). Neurological:  Positive for weakness ( Generalized). OBJECTIVE:     Vitals:    08/03/22 1551   BP: (!) 158/72   Pulse: 62   Resp: 16   Temp: 97.6 °F (36.4 °C)   SpO2: 96%     General appearance:  alert and cooperative with exam, vital signs stable, well nourished, well developed  HEENT: normocephalic, , sclera clear, conjunctiva pink, EOMI, external ears normal, lips normal, oral mucosa moist   Neck: supple, trachea midline  Lungs: equal bilateral expansion, clear to auscultation bilaterally, no cough, no dyspnea  Heart: regular rate and rhythm  Abdomen:  soft, non-tender, bowel sounds active, no masses noted  Extremities:  trace LE edema  Neurologic: no focal neurologic deficits, normal sensation, no tremor, alert and oriented   Psychiatric: alert and oriented, no recent or remote memory deficits, good judgement, mood and affect appropriate  Skin: warm, dry, only one small scabbed area noted to right lower leg, skin looks much better    LAB DATA REVIEWED:     No visits with results within 7 Day(s) from this visit.    Latest known visit with results is:   Procedure visit on 07/06/2022   Component Date Value    Color, UA 07/06/2022 yellow     Clarity, UA 07/06/2022 clear     Glucose, UA POC 07/06/2022 -     Bilirubin, UA 07/06/2022 -     Ketones, UA 07/06/2022 -     Spec Grav, UA 07/06/2022 1.010     Blood, UA POC 07/06/2022 -     pH, UA 07/06/2022 5.5     Protein, UA POC 07/06/2022 -     Urobilinogen, UA 07/06/2022 0.2     Leukocytes, UA 07/06/2022 trace     Nitrite, UA 07/06/2022 -        Established Patient Visit Time: 45 minutes, Greater than 50% of the time in this visit was spent counseling and coordinating care of this patient. This dictation was generated by voice recognition computer software. Although all attempts are made to edit the dictation for accuracy, there may be errors in the transcription that are not intended.     Electronically signed by ELISABETH Carlos CNP on 8/22/2022 at 5:27 AM

## 2022-08-22 VITALS
RESPIRATION RATE: 16 BRPM | DIASTOLIC BLOOD PRESSURE: 72 MMHG | OXYGEN SATURATION: 96 % | TEMPERATURE: 97.6 F | SYSTOLIC BLOOD PRESSURE: 158 MMHG | HEART RATE: 62 BPM

## 2022-08-22 ASSESSMENT — ENCOUNTER SYMPTOMS
BACK PAIN: 1
EYES NEGATIVE: 1
DIARRHEA: 0
ABDOMINAL DISTENTION: 0
SHORTNESS OF BREATH: 1
CONSTIPATION: 1
WHEEZING: 0

## 2022-09-08 DIAGNOSIS — I47.29 NONSUSTAINED VENTRICULAR TACHYCARDIA: ICD-10-CM

## 2022-09-08 DIAGNOSIS — Z95.0 PACEMAKER: ICD-10-CM

## 2022-09-08 DIAGNOSIS — I49.5 SA NODE DYSFUNCTION (HCC): Primary | ICD-10-CM

## 2022-09-08 PROCEDURE — 93294 REM INTERROG EVL PM/LDLS PM: CPT | Performed by: CLINICAL NURSE SPECIALIST

## 2022-09-08 PROCEDURE — 93296 REM INTERROG EVL PM/IDS: CPT | Performed by: CLINICAL NURSE SPECIALIST

## 2022-09-28 RX ORDER — AMIODARONE HYDROCHLORIDE 200 MG/1
200 TABLET ORAL DAILY
Qty: 30 TABLET | Refills: 5 | Status: SHIPPED | OUTPATIENT
Start: 2022-09-28

## 2022-11-21 ENCOUNTER — OFFICE VISIT (OUTPATIENT)
Dept: CARDIOLOGY CLINIC | Age: 82
End: 2022-11-21
Payer: MEDICARE

## 2022-11-21 VITALS
SYSTOLIC BLOOD PRESSURE: 126 MMHG | HEART RATE: 89 BPM | DIASTOLIC BLOOD PRESSURE: 64 MMHG | BODY MASS INDEX: 30.07 KG/M2 | HEIGHT: 72 IN | WEIGHT: 222 LBS

## 2022-11-21 DIAGNOSIS — E78.00 PURE HYPERCHOLESTEROLEMIA: ICD-10-CM

## 2022-11-21 DIAGNOSIS — R06.02 SOB (SHORTNESS OF BREATH): ICD-10-CM

## 2022-11-21 DIAGNOSIS — I49.5 SA NODE DYSFUNCTION (HCC): ICD-10-CM

## 2022-11-21 DIAGNOSIS — I48.91 ATRIAL FIBRILLATION, UNSPECIFIED TYPE (HCC): ICD-10-CM

## 2022-11-21 DIAGNOSIS — I25.10 CORONARY ARTERY DISEASE INVOLVING NATIVE CORONARY ARTERY OF NATIVE HEART WITHOUT ANGINA PECTORIS: ICD-10-CM

## 2022-11-21 DIAGNOSIS — I47.29 NONSUSTAINED VENTRICULAR TACHYCARDIA: ICD-10-CM

## 2022-11-21 DIAGNOSIS — Z95.0 PACEMAKER: Primary | ICD-10-CM

## 2022-11-21 DIAGNOSIS — I10 ESSENTIAL HYPERTENSION: ICD-10-CM

## 2022-11-21 DIAGNOSIS — Z79.899 LONG TERM CURRENT USE OF ANTIARRHYTHMIC DRUG: ICD-10-CM

## 2022-11-21 DIAGNOSIS — I50.32 CHRONIC DIASTOLIC CONGESTIVE HEART FAILURE (HCC): ICD-10-CM

## 2022-11-21 PROCEDURE — G8417 CALC BMI ABV UP PARAM F/U: HCPCS | Performed by: INTERNAL MEDICINE

## 2022-11-21 PROCEDURE — 3078F DIAST BP <80 MM HG: CPT | Performed by: INTERNAL MEDICINE

## 2022-11-21 PROCEDURE — 93280 PM DEVICE PROGR EVAL DUAL: CPT | Performed by: INTERNAL MEDICINE

## 2022-11-21 PROCEDURE — 1123F ACP DISCUSS/DSCN MKR DOCD: CPT | Performed by: INTERNAL MEDICINE

## 2022-11-21 PROCEDURE — 3074F SYST BP LT 130 MM HG: CPT | Performed by: INTERNAL MEDICINE

## 2022-11-21 PROCEDURE — 1036F TOBACCO NON-USER: CPT | Performed by: INTERNAL MEDICINE

## 2022-11-21 PROCEDURE — G8427 DOCREV CUR MEDS BY ELIG CLIN: HCPCS | Performed by: INTERNAL MEDICINE

## 2022-11-21 PROCEDURE — G8484 FLU IMMUNIZE NO ADMIN: HCPCS | Performed by: INTERNAL MEDICINE

## 2022-11-21 PROCEDURE — 99214 OFFICE O/P EST MOD 30 MIN: CPT | Performed by: INTERNAL MEDICINE

## 2022-11-21 ASSESSMENT — ENCOUNTER SYMPTOMS
RESPIRATORY NEGATIVE: 1
GASTROINTESTINAL NEGATIVE: 1
SHORTNESS OF BREATH: 0
EYES NEGATIVE: 1
NAUSEA: 0
DIARRHEA: 0
VOMITING: 0

## 2022-11-21 NOTE — PROGRESS NOTES
[facility]                            Date:  11/21/2022  Patient: Elizabeth Felipe  Age:  81 y.o., 1940      Reason for evaluation:         SUBJECTIVE:    Returns today follow-up assessment overall doing well follow-up for pacemaker sinus node dysfunction hypertension hyperlipidemia paroxysmal atrial fibrillation coronary artery disease. Denies palpitations edema improved dyspnea improved denies chest discomfort no other complaints or issues reported. Blood pressure 126/64 heart 89. Review of Systems   Constitutional: Negative. Negative for chills, fever and unexpected weight change. HENT: Negative. Eyes: Negative. Respiratory: Negative. Negative for shortness of breath. Cardiovascular: Negative. Negative for chest pain. Gastrointestinal: Negative. Negative for diarrhea, nausea and vomiting. Endocrine: Negative. Genitourinary: Negative. Musculoskeletal: Negative. Skin: Negative. Neurological: Negative. OBJECTIVE:     /64   Pulse 89   Ht 6' (1.829 m)   Wt 222 lb (100.7 kg)   BMI 30.11 kg/m²     Labs:   CBC: No results for input(s): WBC, HGB, HCT, PLT in the last 72 hours. BMP:No results for input(s): NA, K, CO2, BUN, CREATININE, LABGLOM, GLUCOSE in the last 72 hours. BNP: No results for input(s): BNP in the last 72 hours. PT/INR: No results for input(s): PROTIME, INR in the last 72 hours. APTT:No results for input(s): APTT in the last 72 hours. CARDIAC ENZYMES:No results for input(s): CKTOTAL, CKMB, CKMBINDEX, TROPONINI in the last 72 hours. FASTING LIPID PANEL:  Lab Results   Component Value Date/Time    HDL 48 05/25/2022 01:59 AM    LDLCALC 34 05/25/2022 01:59 AM    TRIG 64 05/25/2022 01:59 AM     LIVER PROFILE:No results for input(s): AST, ALT, LABALBU in the last 72 hours.         Past Medical History:   Diagnosis Date    Acute liver failure without hepatic coma 10/23/2018    Back pain     \"with tired legs as a result\" Bladder cancer ([facility] [address] 12/19/2018    Blood circulation, collateral     Carotid arterial disease ([facility] [address]     recent surgery    CKD (chronic kidney disease), 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/2018    Constipation     COPD with acute lower respiratory infection ([facility] [address] 02/24/2021    Epigastric discomfort     GERD (gastroesophageal reflux disease)     Hyperlipidemia     Hypertension     Hypertension     Pacemaker     Palliative care patient 10/23/2018    Pneumonia due to infectious organism 11/06/2018    Primary osteoarthritis of left knee 10/14/2018    PVD (peripheral vascular disease) (HCC)     Tremor     Tremor on Right side x 1-2 weeks per stepdaughter    Type 2 diabetes mellitus with complication, without long-term current use of insulin ([facility] [address] 01/21/2021     Past Surgical History:   Procedure Laterality Date    BACK SURGERY      CARDIAC CATHETERIZATION  03/23/2021    100% RCA    COLONOSCOPY  2007?     CYSTOSCOPY Bilateral 12/19/2018    CYSTOSCOPY, BIOSPY FULGURATION OF BLADDER TUMOR POSSIBLE TURBT, RETROGRADE PYELOGRAM performed by Madeline Winslow MD at [address] [facility] COLONOSCOPY FLX DX W/COLLJ SPEC WHEN PFRMD N/A 09/11/2017    Dr Gomes Area internal hemorrhoids, diverticular disease-HP-No recall (age)    [place] REVISE MEDIAN N/CARPAL TUNNEL SURG Left 07/18/2018    OPEN CARPAL TUNNEL RELEASE performed by Michelle Byrd MD at [facility] Left 08/28/2018    LEFT CAROTID ENDARTERECTOMY WITH VEIN PATCH ANGIOPLASTY AND COMPLETION ANGIOGRAM performed by Radha Rubin MD at [facility] 10/15/2018    LEFT COMPLEX TOTAL KNEE ARTHROPLASTY performed by Michelle Byrd MD at [address] [facility] ENDOSCOPY N/A 11/18/2021    Dr Betty Cooper, Sub prep lg amount of solid and semisolid food/Medication in stomach body/antrum/pylorus, stomach lumen appeared to distended w air insufflation and collapse upon suction,   inadequate exam sugg of gastroparesis, repeat in 10-14 days    UPPER GASTROINTESTINAL ENDOSCOPY N/A 01/06/2022    Dr Lorri Briceno, distal stomach body likely GIST nodule-Submucosal fundic body gastric mucosa, Benign, (-)Hpylori, (-)Sprue    UPPER GASTROINTESTINAL ENDOSCOPY N/A 03/07/2022    Dr Lorri Briceno, EUS submucosal gastric nodule, otherwise normal, 1 year recall    UPPER GASTROINTESTINAL ENDOSCOPY N/A 03/07/2022    Dr Lorri Briceno, W EUS,  nodular area at end of rugal gold in distal stomach body    VASCULAR SURGERY  04/21/2015    Marcelo BESS Ultrasound guided access of left common femoral artery. Aortogram.Diagnostic right lower extremity arteriogram.Radiologic supervision and interpretation. VASCULAR SURGERY  01/13/2015    Marcelo Gilmore M.D Atherectomy,angioplasty,and stenting of left superficial femoral artery. VASCULAR SURGERY  03/11/2014    Marcelo Gilmore M.D. Ultrasound-guided access of right common femoral artery. Aortogram.Left lower extremity arteriogram.Atherectomy and angioplasty of left superficial femoral artery. Radiologic supervision and interpretation. VASCULAR SURGERY  01/18/2013    Marcelo BESS Aortogram.Multistation arteriogram right lower extremity. Laser atherectomy and angioplasty of right superficial femoral artery. Selective catheterization of right tibioperoneal trunk. Angioplasty of peroneal artery and tibioperoneal trunk. VASCULAR SURGERY  10/30/2018    [facility]. Ultrasound guided cannulation of right internal vein. Placement of right internal jugular vein tunneled dialysis catheter bard equistream xk 23cm tip to cuff    VASCULAR SURGERY  12/17/2018    [facility]. Removal of tunneled dilaysis catheter right internal jugular vein.      Family History   Problem Relation Age of Onset    Colon Cancer Father     Diabetes Brother     Colon Polyps Neg Hx     Liver Cancer Neg Hx     Liver Disease Neg Hx     Esophageal Cancer Neg Hx     Rectal Cancer Neg Hx     Stomach Cancer Neg Hx      Allergies   Allergen Reactions    Eliquis [Apixaban] Other (See Comments)     \"almost bled to death\"    Promethazine Hcl Other (See Comments)     He became encephalopathic for several hours and was not responsive.      Current Outpatient Medications   Medication Sig Dispense Refill    amiodarone (CORDARONE) 200 MG tablet TAKE 1 TABLET BY MOUTH DAILY 30 tablet 5    glipiZIDE (GLUCOTROL XL) 5 MG extended release tablet Take 5 mg by mouth daily Takes a half a day      mirabegron (MYRBETRIQ) 25 MG TB24 Take 1 tablet by mouth daily 90 tablet 3    TRULANCE 3 MG TABS TAKE 1 TABLET BY MOUTH DAILY 30 tablet 5    tamsulosin (FLOMAX) 0.4 MG capsule Take 2 capsules by mouth daily 60 capsule 11    SENNA PLUS 8.6-50 MG per tablet       atorvastatin (LIPITOR) 20 MG tablet Take 1 tablet by mouth daily 30 tablet 0    metoprolol succinate (TOPROL XL) 100 MG extended release tablet Take 1 tablet by mouth daily 30 tablet 3    NIFEdipine (ADALAT CC) 60 MG extended release tablet Take 1 tablet by mouth daily 30 tablet 3    folic acid (FOLVITE) 1 MG tablet Take 1 tablet by mouth daily 30 tablet 3    vitamin D (ERGOCALCIFEROL) 1.25 MG (06316 UT) CAPS capsule Take 1 capsule by mouth once a week (Patient taking differently: Take 50,000 Units by mouth once a week Mondays) 5 capsule 0    OXYGEN Inhale 2 L into the lungs continuous 1 Container 5    bumetanide (BUMEX) 1 MG tablet Take 2 tablets by mouth daily 180 tablet 0    metOLazone (ZAROXOLYN) 2.5 MG tablet Take 1 tablet by mouth daily (Patient taking differently: Take 2.5 mg by mouth daily as needed ) 90 tablet 0    UNABLE TO FIND Take 20 mg by mouth daily trosopium  (Patient not taking: Reported on 11/21/2022)      pantoprazole (PROTONIX) 20 MG tablet Take 40 mg by mouth daily  (Patient not taking: Reported on 11/21/2022)      aspirin 81 MG EC tablet Take 1 tablet by mouth daily (Patient not taking: Reported on 11/21/2022) 30 tablet 3    ferrous sulfate (IRON 325) 325 (65 Fe) MG tablet Take 1 tablet by mouth 2 times daily (with meals) (Patient not taking: Reported on 2022) 30 tablet 3     No current facility-administered medications for this visit. Social History     Socioeconomic History    Marital status:      Spouse name: Not on file    Number of children: Not on file    Years of education: Not on file    Highest education level: Not on file   Occupational History    Not on file   Tobacco Use    Smoking status: Former     Packs/day: 2.00     Years: 48.00     Pack years: 96.00     Types: Cigarettes     Quit date: 6/3/2003     Years since quittin.4    Smokeless tobacco: Never   Vaping Use    Vaping Use: Never used   Substance and Sexual Activity    Alcohol use: Yes     Alcohol/week: 12.0 standard drinks     Types: 12 Glasses of wine per week     Comment: 1 glasses of wine every night    Drug use: No    Sexual activity: Yes     Partners: Female   Other Topics Concern    Not on file   Social History Narrative    Not on file     Social Determinants of Health     Financial Resource Strain: Not on file   Food Insecurity: Not on file   Transportation Needs: Not on file   Physical Activity: Not on file   Stress: Not on file   Social Connections: Not on file   Intimate Partner Violence: Not on file   Housing Stability: Not on file       Physical Examination:  /64   Pulse 89   Ht 6' (1.829 m)   Wt 222 lb (100.7 kg)   BMI 30.11 kg/m²   Physical Exam  Vitals reviewed. Constitutional:       Appearance: He is well-developed. Neck:      Vascular: No carotid bruit or JVD. Cardiovascular:      Rate and Rhythm: Normal rate and regular rhythm. Heart sounds: Normal heart sounds. No murmur heard. No friction rub. No gallop. Pulmonary:      Effort: Pulmonary effort is normal. No respiratory distress. Breath sounds: Normal breath sounds. No wheezing or rales. Abdominal:      General: There is no distension. Tenderness: There is no abdominal tenderness.    Lymphadenopathy: Cervical: No cervical adenopathy. Skin:     General: Skin is warm and dry. ASSESSMENT:     Diagnosis Orders   1. Pacemaker        2. SA node dysfunction ([address]        3. Essential hypertension        4. Pure hypercholesterolemia        5. Atrial fibrillation, unspecified type ([address]        6. Long term current use of antiarrhythmic drug        7. Nonsustained ventricular tachycardia        8. Chronic diastolic congestive heart failure ([address]        9. Coronary artery disease involving native coronary artery of native heart without angina pectoris        10. SOB (shortness of breath)            PLAN:  No orders of the defined types were placed in this encounter. No orders of the defined types were placed in this encounter. Continue present medications  Recommend follow-up assessment in 6 months    Return in about 6 months (around 5/21/2023) for return to Dr. Arnaldo Barriga only. Tommy Echeverria MD 11/21/2022 12:56 PM [facility]      Thisdictation was generated by voice recognition computer software. Although all attempts are made to edit the dictation for accuracy, there may be errors in the transcription that are not intended.

## 2022-11-21 NOTE — PROGRESS NOTES
Pacemaker interrogated and testing completed. Device status within normal limits. No new episodes. See scanned report for details.  Next remote check 2/21/23

## 2022-11-26 ENCOUNTER — HOSPITAL ENCOUNTER (INPATIENT)
Age: 82
LOS: 2 days | Discharge: HOME | DRG: 193 | End: 2022-11-28
Attending: EMERGENCY MEDICINE | Admitting: STUDENT IN AN ORGANIZED HEALTH CARE EDUCATION/TRAINING PROGRAM
Payer: MEDICARE

## 2022-11-26 ENCOUNTER — APPOINTMENT (OUTPATIENT)
Dept: GENERAL RADIOLOGY | Age: 82
DRG: 193 | End: 2022-11-26
Payer: MEDICARE

## 2022-11-26 DIAGNOSIS — J18.9 COMMUNITY ACQUIRED PNEUMONIA, UNSPECIFIED LATERALITY: ICD-10-CM

## 2022-11-26 DIAGNOSIS — J18.9 COMMUNITY ACQUIRED PNEUMONIA OF RIGHT LOWER LOBE OF LUNG: ICD-10-CM

## 2022-11-26 DIAGNOSIS — I50.9 ACUTE ON CHRONIC CONGESTIVE HEART FAILURE, UNSPECIFIED HEART FAILURE TYPE (HCC): Primary | ICD-10-CM

## 2022-11-26 PROBLEM — J16.0 CAP (COMMUNITY ACQUIRED PNEUMONIA) DUE TO CHLAMYDIA SPECIES: Status: ACTIVE | Noted: 2022-11-26

## 2022-11-26 PROBLEM — I50.33 ACUTE ON CHRONIC DIASTOLIC CONGESTIVE HEART FAILURE (HCC): Status: ACTIVE | Noted: 2021-04-13

## 2022-11-26 LAB
ADENOVIRUS BY PCR: NOT DETECTED
ALBUMIN SERPL-MCNC: 4 G/DL (ref 3.5–5.2)
ALLENS TEST: ABNORMAL
ALP BLD-CCNC: 92 U/L (ref 40–130)
ALT SERPL-CCNC: 24 U/L (ref 5–41)
ANION GAP SERPL CALCULATED.3IONS-SCNC: 16 MMOL/L (ref 7–19)
AST SERPL-CCNC: 18 U/L (ref 5–40)
BACTERIA: NEGATIVE /HPF
BASE EXCESS ARTERIAL: -6.1 MMOL/L (ref -2–2)
BASOPHILS ABSOLUTE: 0.1 K/UL (ref 0–0.2)
BASOPHILS RELATIVE PERCENT: 0.3 % (ref 0–1)
BILIRUB SERPL-MCNC: 1.3 MG/DL (ref 0.2–1.2)
BILIRUBIN URINE: NEGATIVE
BLOOD, URINE: NEGATIVE
BORDETELLA PARAPERTUSSIS BY PCR: NOT DETECTED
BORDETELLA PERTUSSIS BY PCR: NOT DETECTED
BUN BLDV-MCNC: 57 MG/DL (ref 8–23)
CALCIUM SERPL-MCNC: 9.2 MG/DL (ref 8.8–10.2)
CARBOXYHEMOGLOBIN ARTERIAL: 2 % (ref 0–5)
CHLAMYDOPHILIA PNEUMONIAE BY PCR: NOT DETECTED
CHLORIDE BLD-SCNC: 103 MMOL/L (ref 98–111)
CLARITY: CLEAR
CO2: 18 MMOL/L (ref 22–29)
COLOR: YELLOW
CORONAVIRUS 229E BY PCR: NOT DETECTED
CORONAVIRUS HKU1 BY PCR: NOT DETECTED
CORONAVIRUS NL63 BY PCR: NOT DETECTED
CORONAVIRUS OC43 BY PCR: NOT DETECTED
CREAT SERPL-MCNC: 2.9 MG/DL (ref 0.5–1.2)
CRYSTALS, UA: ABNORMAL /HPF
EOSINOPHILS ABSOLUTE: 0 K/UL (ref 0–0.6)
EOSINOPHILS RELATIVE PERCENT: 0 % (ref 0–5)
EPITHELIAL CELLS, UA: 0 /HPF (ref 0–5)
GFR SERPL CREATININE-BSD FRML MDRD: 21 ML/MIN/{1.73_M2}
GLUCOSE BLD-MCNC: 162 MG/DL (ref 70–99)
GLUCOSE BLD-MCNC: 163 MG/DL (ref 70–99)
GLUCOSE BLD-MCNC: 200 MG/DL (ref 70–99)
GLUCOSE BLD-MCNC: 208 MG/DL (ref 74–109)
GLUCOSE URINE: NEGATIVE MG/DL
HCO3 ARTERIAL: 20.7 MMOL/L (ref 22–26)
HCT VFR BLD CALC: 36.8 % (ref 42–52)
HEMOGLOBIN, ART, EXTENDED: 11.1 G/DL (ref 14–18)
HEMOGLOBIN: 11.4 G/DL (ref 14–18)
HUMAN METAPNEUMOVIRUS BY PCR: NOT DETECTED
HUMAN RHINOVIRUS/ENTEROVIRUS BY PCR: NOT DETECTED
HYALINE CASTS: 2 /HPF (ref 0–8)
IMMATURE GRANULOCYTES #: 0.3 K/UL
INFLUENZA A BY PCR: NOT DETECTED
INFLUENZA B BY PCR: NOT DETECTED
IRON SATURATION: 8 % (ref 14–50)
IRON: 19 UG/DL (ref 59–158)
KETONES, URINE: NEGATIVE MG/DL
LACTIC ACID: 2 MMOL/L (ref 0.5–1.9)
LEUKOCYTE ESTERASE, URINE: ABNORMAL
LYMPHOCYTES ABSOLUTE: 2.1 K/UL (ref 1.1–4.5)
LYMPHOCYTES RELATIVE PERCENT: 11.4 % (ref 20–40)
MCH RBC QN AUTO: 29.5 PG (ref 27–31)
MCHC RBC AUTO-ENTMCNC: 31 G/DL (ref 33–37)
MCV RBC AUTO: 95.1 FL (ref 80–94)
METHEMOGLOBIN ARTERIAL: 0.4 %
MONOCYTES ABSOLUTE: 1.7 K/UL (ref 0–0.9)
MONOCYTES RELATIVE PERCENT: 9.3 % (ref 0–10)
MYCOPLASMA PNEUMONIAE BY PCR: NOT DETECTED
NEUTROPHILS ABSOLUTE: 14.2 K/UL (ref 1.5–7.5)
NEUTROPHILS RELATIVE PERCENT: 77.6 % (ref 50–65)
NITRITE, URINE: NEGATIVE
O2 CONTENT ARTERIAL: 14.2 ML/DL
O2 DELIVERY DEVICE: ABNORMAL
O2 SAT, ARTERIAL: 90.8 %
O2 THERAPY: ABNORMAL
OXYGEN FLOW: 2
PARAINFLUENZA VIRUS 1 BY PCR: NOT DETECTED
PARAINFLUENZA VIRUS 2 BY PCR: NOT DETECTED
PARAINFLUENZA VIRUS 3 BY PCR: NOT DETECTED
PARAINFLUENZA VIRUS 4 BY PCR: NOT DETECTED
PCO2 ARTERIAL: 45 MMHG (ref 35–45)
PDW BLD-RTO: 14.2 % (ref 11.5–14.5)
PERFORMED ON: ABNORMAL
PH ARTERIAL: 7.27 (ref 7.35–7.45)
PH UA: 5 (ref 5–8)
PLATELET # BLD: 153 K/UL (ref 130–400)
PMV BLD AUTO: 10.1 FL (ref 9.4–12.4)
PO2 ARTERIAL: 63 MMHG (ref 80–100)
POTASSIUM SERPL-SCNC: 4.6 MMOL/L (ref 3.5–5)
POTASSIUM, WHOLE BLOOD: 4.7
PRO-BNP: 7592 PG/ML (ref 0–1800)
PROCALCITONIN: 0.23 NG/ML (ref 0–0.09)
PROTEIN UA: NEGATIVE MG/DL
RBC # BLD: 3.87 M/UL (ref 4.7–6.1)
RBC UA: 1 /HPF (ref 0–4)
RESPIRATORY SYNCYTIAL VIRUS BY PCR: NOT DETECTED
SAMPLE SOURCE: ABNORMAL
SARS-COV-2, PCR: NOT DETECTED
SODIUM BLD-SCNC: 137 MMOL/L (ref 136–145)
SPECIFIC GRAVITY UA: 1.01 (ref 1–1.03)
TOTAL IRON BINDING CAPACITY: 236 UG/DL (ref 250–400)
TOTAL PROTEIN: 7 G/DL (ref 6.6–8.7)
TROPONIN: 0.04 NG/ML (ref 0–0.03)
UROBILINOGEN, URINE: 0.2 E.U./DL
WBC # BLD: 18.3 K/UL (ref 4.8–10.8)
WBC UA: 5 /HPF (ref 0–5)

## 2022-11-26 PROCEDURE — 6370000000 HC RX 637 (ALT 250 FOR IP): Performed by: NURSE PRACTITIONER

## 2022-11-26 PROCEDURE — 81001 URINALYSIS AUTO W/SCOPE: CPT

## 2022-11-26 PROCEDURE — 6360000002 HC RX W HCPCS: Performed by: EMERGENCY MEDICINE

## 2022-11-26 PROCEDURE — 93005 ELECTROCARDIOGRAM TRACING: CPT | Performed by: EMERGENCY MEDICINE

## 2022-11-26 PROCEDURE — 2580000003 HC RX 258: Performed by: EMERGENCY MEDICINE

## 2022-11-26 PROCEDURE — 0202U NFCT DS 22 TRGT SARS-COV-2: CPT

## 2022-11-26 PROCEDURE — 99285 EMERGENCY DEPT VISIT HI MDM: CPT

## 2022-11-26 PROCEDURE — 36415 COLL VENOUS BLD VENIPUNCTURE: CPT

## 2022-11-26 PROCEDURE — 2500000003 HC RX 250 WO HCPCS: Performed by: NURSE PRACTITIONER

## 2022-11-26 PROCEDURE — 2140000000 HC CCU INTERMEDIATE R&B

## 2022-11-26 PROCEDURE — 84484 ASSAY OF TROPONIN QUANT: CPT

## 2022-11-26 PROCEDURE — 96374 THER/PROPH/DIAG INJ IV PUSH: CPT

## 2022-11-26 PROCEDURE — 36600 WITHDRAWAL OF ARTERIAL BLOOD: CPT

## 2022-11-26 PROCEDURE — 2700000000 HC OXYGEN THERAPY PER DAY

## 2022-11-26 PROCEDURE — 83605 ASSAY OF LACTIC ACID: CPT

## 2022-11-26 PROCEDURE — 84145 PROCALCITONIN (PCT): CPT

## 2022-11-26 PROCEDURE — 85025 COMPLETE CBC W/AUTO DIFF WBC: CPT

## 2022-11-26 PROCEDURE — 87040 BLOOD CULTURE FOR BACTERIA: CPT

## 2022-11-26 PROCEDURE — 94760 N-INVAS EAR/PLS OXIMETRY 1: CPT

## 2022-11-26 PROCEDURE — 2580000003 HC RX 258: Performed by: NURSE PRACTITIONER

## 2022-11-26 PROCEDURE — 82947 ASSAY GLUCOSE BLOOD QUANT: CPT

## 2022-11-26 PROCEDURE — 94660 CPAP INITIATION&MGMT: CPT

## 2022-11-26 PROCEDURE — 82803 BLOOD GASES ANY COMBINATION: CPT

## 2022-11-26 PROCEDURE — 80053 COMPREHEN METABOLIC PANEL: CPT

## 2022-11-26 PROCEDURE — 83540 ASSAY OF IRON: CPT

## 2022-11-26 PROCEDURE — 83880 ASSAY OF NATRIURETIC PEPTIDE: CPT

## 2022-11-26 PROCEDURE — 71045 X-RAY EXAM CHEST 1 VIEW: CPT

## 2022-11-26 PROCEDURE — 94640 AIRWAY INHALATION TREATMENT: CPT

## 2022-11-26 PROCEDURE — 6360000002 HC RX W HCPCS: Performed by: NURSE PRACTITIONER

## 2022-11-26 PROCEDURE — 83550 IRON BINDING TEST: CPT

## 2022-11-26 RX ORDER — INSULIN LISPRO 100 [IU]/ML
0-4 INJECTION, SOLUTION INTRAVENOUS; SUBCUTANEOUS NIGHTLY
Status: DISCONTINUED | OUTPATIENT
Start: 2022-11-26 | End: 2022-11-28 | Stop reason: HOSPADM

## 2022-11-26 RX ORDER — ASPIRIN 81 MG/1
81 TABLET ORAL DAILY
Status: DISCONTINUED | OUTPATIENT
Start: 2022-11-26 | End: 2022-11-28 | Stop reason: HOSPADM

## 2022-11-26 RX ORDER — ACETAMINOPHEN 650 MG/1
650 SUPPOSITORY RECTAL EVERY 6 HOURS PRN
Status: DISCONTINUED | OUTPATIENT
Start: 2022-11-26 | End: 2022-11-28 | Stop reason: HOSPADM

## 2022-11-26 RX ORDER — ENOXAPARIN SODIUM 100 MG/ML
30 INJECTION SUBCUTANEOUS DAILY
Status: DISCONTINUED | OUTPATIENT
Start: 2022-11-26 | End: 2022-11-28 | Stop reason: HOSPADM

## 2022-11-26 RX ORDER — AZITHROMYCIN 250 MG/1
500 TABLET, FILM COATED ORAL EVERY 24 HOURS
Status: DISCONTINUED | OUTPATIENT
Start: 2022-11-27 | End: 2022-11-28 | Stop reason: HOSPADM

## 2022-11-26 RX ORDER — TAMSULOSIN HYDROCHLORIDE 0.4 MG/1
0.8 CAPSULE ORAL DAILY
Status: DISCONTINUED | OUTPATIENT
Start: 2022-11-26 | End: 2022-11-28 | Stop reason: HOSPADM

## 2022-11-26 RX ORDER — ONDANSETRON 4 MG/1
4 TABLET, ORALLY DISINTEGRATING ORAL EVERY 8 HOURS PRN
Status: DISCONTINUED | OUTPATIENT
Start: 2022-11-26 | End: 2022-11-28 | Stop reason: HOSPADM

## 2022-11-26 RX ORDER — TROSPIUM CHLORIDE 20 MG/1
20 TABLET, FILM COATED ORAL NIGHTLY
Status: DISCONTINUED | OUTPATIENT
Start: 2022-11-26 | End: 2022-11-28 | Stop reason: HOSPADM

## 2022-11-26 RX ORDER — FUROSEMIDE 10 MG/ML
60 INJECTION INTRAMUSCULAR; INTRAVENOUS ONCE
Status: COMPLETED | OUTPATIENT
Start: 2022-11-26 | End: 2022-11-26

## 2022-11-26 RX ORDER — IPRATROPIUM BROMIDE AND ALBUTEROL SULFATE 2.5; .5 MG/3ML; MG/3ML
1 SOLUTION RESPIRATORY (INHALATION)
Status: DISCONTINUED | OUTPATIENT
Start: 2022-11-26 | End: 2022-11-28 | Stop reason: HOSPADM

## 2022-11-26 RX ORDER — SODIUM CHLORIDE 9 MG/ML
INJECTION, SOLUTION INTRAVENOUS PRN
Status: DISCONTINUED | OUTPATIENT
Start: 2022-11-26 | End: 2022-11-28 | Stop reason: HOSPADM

## 2022-11-26 RX ORDER — PANTOPRAZOLE SODIUM 40 MG/1
40 TABLET, DELAYED RELEASE ORAL DAILY
Status: DISCONTINUED | OUTPATIENT
Start: 2022-11-26 | End: 2022-11-28 | Stop reason: HOSPADM

## 2022-11-26 RX ORDER — VITAMIN B COMPLEX
2000 TABLET ORAL DAILY
Status: DISCONTINUED | OUTPATIENT
Start: 2022-11-26 | End: 2022-11-28 | Stop reason: HOSPADM

## 2022-11-26 RX ORDER — ERGOCALCIFEROL 1.25 MG/1
50000 CAPSULE ORAL WEEKLY
Status: DISCONTINUED | OUTPATIENT
Start: 2022-12-02 | End: 2022-11-28 | Stop reason: HOSPADM

## 2022-11-26 RX ORDER — SODIUM CHLORIDE 0.9 % (FLUSH) 0.9 %
5-40 SYRINGE (ML) INJECTION PRN
Status: DISCONTINUED | OUTPATIENT
Start: 2022-11-26 | End: 2022-11-28 | Stop reason: HOSPADM

## 2022-11-26 RX ORDER — DEXTROSE MONOHYDRATE 100 MG/ML
INJECTION, SOLUTION INTRAVENOUS CONTINUOUS PRN
Status: DISCONTINUED | OUTPATIENT
Start: 2022-11-26 | End: 2022-11-28 | Stop reason: HOSPADM

## 2022-11-26 RX ORDER — NIFEDIPINE 60 MG/1
60 TABLET, EXTENDED RELEASE ORAL DAILY
Status: DISCONTINUED | OUTPATIENT
Start: 2022-11-26 | End: 2022-11-28 | Stop reason: HOSPADM

## 2022-11-26 RX ORDER — ACETAMINOPHEN 325 MG/1
650 TABLET ORAL EVERY 6 HOURS PRN
Status: DISCONTINUED | OUTPATIENT
Start: 2022-11-26 | End: 2022-11-28 | Stop reason: HOSPADM

## 2022-11-26 RX ORDER — AMIODARONE HYDROCHLORIDE 200 MG/1
200 TABLET ORAL DAILY
Status: DISCONTINUED | OUTPATIENT
Start: 2022-11-26 | End: 2022-11-28 | Stop reason: HOSPADM

## 2022-11-26 RX ORDER — METOPROLOL SUCCINATE 50 MG/1
100 TABLET, EXTENDED RELEASE ORAL DAILY
Status: DISCONTINUED | OUTPATIENT
Start: 2022-11-26 | End: 2022-11-28 | Stop reason: HOSPADM

## 2022-11-26 RX ORDER — ONDANSETRON 2 MG/ML
4 INJECTION INTRAMUSCULAR; INTRAVENOUS EVERY 6 HOURS PRN
Status: DISCONTINUED | OUTPATIENT
Start: 2022-11-26 | End: 2022-11-28 | Stop reason: HOSPADM

## 2022-11-26 RX ORDER — POLYETHYLENE GLYCOL 3350 17 G/17G
17 POWDER, FOR SOLUTION ORAL DAILY PRN
Status: DISCONTINUED | OUTPATIENT
Start: 2022-11-26 | End: 2022-11-28 | Stop reason: HOSPADM

## 2022-11-26 RX ORDER — ATORVASTATIN CALCIUM 20 MG/1
20 TABLET, FILM COATED ORAL DAILY
Status: DISCONTINUED | OUTPATIENT
Start: 2022-11-26 | End: 2022-11-28 | Stop reason: HOSPADM

## 2022-11-26 RX ORDER — FOLIC ACID 1 MG/1
1 TABLET ORAL DAILY
Status: DISCONTINUED | OUTPATIENT
Start: 2022-11-26 | End: 2022-11-28 | Stop reason: HOSPADM

## 2022-11-26 RX ORDER — METOLAZONE 2.5 MG/1
2.5 TABLET ORAL DAILY
Status: DISCONTINUED | OUTPATIENT
Start: 2022-11-26 | End: 2022-11-27

## 2022-11-26 RX ORDER — SODIUM CHLORIDE 0.9 % (FLUSH) 0.9 %
5-40 SYRINGE (ML) INJECTION EVERY 12 HOURS SCHEDULED
Status: DISCONTINUED | OUTPATIENT
Start: 2022-11-26 | End: 2022-11-28 | Stop reason: HOSPADM

## 2022-11-26 RX ORDER — BUMETANIDE 0.25 MG/ML
2 INJECTION, SOLUTION INTRAMUSCULAR; INTRAVENOUS EVERY 12 HOURS
Status: DISCONTINUED | OUTPATIENT
Start: 2022-11-26 | End: 2022-11-28

## 2022-11-26 RX ORDER — INSULIN LISPRO 100 [IU]/ML
0-4 INJECTION, SOLUTION INTRAVENOUS; SUBCUTANEOUS
Status: DISCONTINUED | OUTPATIENT
Start: 2022-11-26 | End: 2022-11-28 | Stop reason: HOSPADM

## 2022-11-26 RX ORDER — BUMETANIDE 0.25 MG/ML
2 INJECTION, SOLUTION INTRAMUSCULAR; INTRAVENOUS EVERY 12 HOURS
Status: DISCONTINUED | OUTPATIENT
Start: 2022-11-26 | End: 2022-11-26

## 2022-11-26 RX ADMIN — METOLAZONE 2.5 MG: 2.5 TABLET ORAL at 10:50

## 2022-11-26 RX ADMIN — INSULIN LISPRO 1 UNITS: 100 INJECTION, SOLUTION INTRAVENOUS; SUBCUTANEOUS at 12:39

## 2022-11-26 RX ADMIN — ENOXAPARIN SODIUM 30 MG: 100 INJECTION SUBCUTANEOUS at 10:50

## 2022-11-26 RX ADMIN — IPRATROPIUM BROMIDE AND ALBUTEROL SULFATE 1 AMPULE: 2.5; .5 SOLUTION RESPIRATORY (INHALATION) at 10:10

## 2022-11-26 RX ADMIN — CEFTRIAXONE 1000 MG: 1 INJECTION, POWDER, FOR SOLUTION INTRAMUSCULAR; INTRAVENOUS at 09:27

## 2022-11-26 RX ADMIN — IPRATROPIUM BROMIDE AND ALBUTEROL SULFATE 1 AMPULE: 2.5; .5 SOLUTION RESPIRATORY (INHALATION) at 13:58

## 2022-11-26 RX ADMIN — AZITHROMYCIN DIHYDRATE 500 MG: 500 INJECTION, POWDER, LYOPHILIZED, FOR SOLUTION INTRAVENOUS at 09:29

## 2022-11-26 RX ADMIN — FOLIC ACID 1 MG: 1 TABLET ORAL at 10:50

## 2022-11-26 RX ADMIN — TROSPIUM CHLORIDE 20 MG: 20 TABLET, FILM COATED ORAL at 20:23

## 2022-11-26 RX ADMIN — FUROSEMIDE 60 MG: 10 INJECTION, SOLUTION INTRAMUSCULAR; INTRAVENOUS at 07:24

## 2022-11-26 RX ADMIN — IPRATROPIUM BROMIDE AND ALBUTEROL SULFATE 1 AMPULE: 2.5; .5 SOLUTION RESPIRATORY (INHALATION) at 19:55

## 2022-11-26 RX ADMIN — TAMSULOSIN HYDROCHLORIDE 0.8 MG: 0.4 CAPSULE ORAL at 10:50

## 2022-11-26 RX ADMIN — SODIUM CHLORIDE, PRESERVATIVE FREE 10 ML: 5 INJECTION INTRAVENOUS at 20:23

## 2022-11-26 RX ADMIN — ATORVASTATIN CALCIUM 20 MG: 20 TABLET, FILM COATED ORAL at 10:50

## 2022-11-26 RX ADMIN — BUMETANIDE 2 MG: 0.25 INJECTION, SOLUTION INTRAMUSCULAR; INTRAVENOUS at 17:34

## 2022-11-26 RX ADMIN — Medication 2000 UNITS: at 10:50

## 2022-11-26 RX ADMIN — AMIODARONE HYDROCHLORIDE 200 MG: 200 TABLET ORAL at 10:50

## 2022-11-26 RX ADMIN — NIFEDIPINE 60 MG: 60 TABLET, EXTENDED RELEASE ORAL at 10:50

## 2022-11-26 RX ADMIN — METOPROLOL SUCCINATE 100 MG: 50 TABLET, EXTENDED RELEASE ORAL at 10:50

## 2022-11-26 RX ADMIN — PANTOPRAZOLE SODIUM 40 MG: 40 TABLET, DELAYED RELEASE ORAL at 10:50

## 2022-11-26 ASSESSMENT — ENCOUNTER SYMPTOMS
SORE THROAT: 0
SHORTNESS OF BREATH: 1
DIARRHEA: 0
NAUSEA: 0
RHINORRHEA: 0
VOMITING: 0
BACK PAIN: 0
ABDOMINAL PAIN: 0

## 2022-11-26 ASSESSMENT — PAIN - FUNCTIONAL ASSESSMENT: PAIN_FUNCTIONAL_ASSESSMENT: NONE - DENIES PAIN

## 2022-11-26 NOTE — PROGRESS NOTES
4 Eyes Skin Assessment    Sarbjit Jones is being assessed upon: Admission    I agree that Vane Chaves RN, along with Jaclyn, RN (either 2 RN's or 1 LPN and 1 RN) have performed a thorough Head to Toe Skin Assessment on the patient. ALL assessment sites listed below have been assessed. Areas assessed by both nurses:     [x]   Head, Face, and Ears   [x]   Shoulders, Back, and Chest  [x]   Arms, Elbows, and Hands   [x]   Coccyx, Sacrum, and Ischium  [x]   Legs, Feet, and Heels    Does the Patient have Skin Breakdown? Yes, wound(s) noted upon assessment. It is the responsibility of the Primary Nurse to assure that the following documentation, preventions, orders, and consults are complete on the above noted wound(s): Wound LDA initiated. LDA Flowsheet Documentation includes the Jeanna-wound, Wound Assessment, Measurements, Dressing Treatment, Drainage, and Color.     Ángel Prevention initiated: Yes  Wound Care Orders initiated: No    WOC nurse consulted for Pressure Injury (Stage 3,4, Unstageable, DTI, NWPT, and Complex wounds) and New or Established Ostomies: No        Primary Nurse eSignature: Dee Esparza RN on 11/26/2022 at 10:58 AM      Co-Signer eSignature: Electronically signed by Elvis Roach RN on 11/26/22 at 11:00 AM CST

## 2022-11-26 NOTE — H&P
[place], [place], [address]    DEPARTMENT OF HOSPITALIST MEDICINE        HISTORY & PHYSICAL:          REASON FOR ADMISSION:  Chief Complaint   Patient presents with    Shortness of Breath     Pt presents with shortness of breath for several days. Pt reports that he is out of his Lasix for 5-6 days. EMS reports 89% on Cpap at home. Pt presents on NRB. Fall     Left leg pain. Pt had fall coming down steps when EMS arrived. Pt denies LOC. HISTORY OF PRESENT ILLNESS:  Yadira Barrios is an 81 y.o. male with  PMH of CHF, CKD, COPD, GERD, PVD, Bladder cancer and T2Dm. He presneted to the ED acutely short of breath. Hes been out of his diuretic for 7 days. His legs have become acutely edematous, and he is SOA. He denies feverm, or chest pain. His is on 3 litenc O2 at baseline and wears cpap at night. He fell fetting to the EMS personal.  He fell down his step and landed on his buttocks. Denies any injury, did not hit his head.       PAST MEDICAL HISTORY:  Past Medical History:   Diagnosis Date    Acute liver failure without hepatic coma 10/23/2018    Back pain     \"with tired legs as a result\"    Bladder cancer ([address] 12/19/2018    Blood circulation, collateral     Carotid arterial disease (HCC)     recent surgery    CKD (chronic kidney disease), 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/2018    Constipation     COPD with acute lower respiratory infection ([address] 02/24/2021    Epigastric discomfort     GERD (gastroesophageal reflux disease)     Hyperlipidemia     Hypertension     Hypertension     Pacemaker     Palliative care patient 10/23/2018    Pneumonia due to infectious organism 11/06/2018    Primary osteoarthritis of left knee 10/14/2018    PVD (peripheral vascular disease) (HCC)     Tremor     Tremor on Right side x 1-2 weeks per stepdaughter    Type 2 diabetes mellitus with complication, without long-term current use of insulin ([address] 01/21/2021         PAST SURGICAL HISTORY:  Past Surgical History:   Procedure Laterality Date    BACK SURGERY      CARDIAC CATHETERIZATION  03/23/2021    100% RCA    COLONOSCOPY  2007? CYSTOSCOPY Bilateral 12/19/2018    CYSTOSCOPY, BIOSPY FULGURATION OF BLADDER TUMOR POSSIBLE TURBT, RETROGRADE PYELOGRAM performed by iVcki Landrum MD at [address] [facility] COLONOSCOPY FLX DX W/COLLJ SPEC WHEN PFRMD N/A 09/11/2017    Dr Mike Henderson internal hemorrhoids, diverticular disease-HP-No recall (age)    VA REVISE MEDIAN N/CARPAL TUNNEL SURG Left 07/18/2018    OPEN CARPAL TUNNEL RELEASE performed by Ravindra Mallory MD at [address] Left 08/28/2018    LEFT CAROTID ENDARTERECTOMY WITH VEIN PATCH ANGIOPLASTY AND COMPLETION ANGIOGRAM performed by Alesia Gonzalez MD at [facility] Left 10/15/2018    LEFT COMPLEX TOTAL KNEE ARTHROPLASTY performed by Ravindra Mallory MD at [address] [facility] ENDOSCOPY N/A 11/18/2021    Dr Chika Posey, Sub prep lg amount of solid and semisolid food/Medication in stomach body/antrum/pylorus, stomach lumen appeared to distended w air insufflation and collapse upon suction,   inadequate exam sugg of gastroparesis, repeat in 10-14 days    UPPER GASTROINTESTINAL ENDOSCOPY N/A 01/06/2022    Dr Chika Posey, distal stomach body likely GIST nodule-Submucosal fundic body gastric mucosa, Benign, (-)Hpylori, (-)Sprue    UPPER GASTROINTESTINAL ENDOSCOPY N/A 03/07/2022    Dr Chika Posey, EUS submucosal gastric nodule, otherwise normal, 1 year recall    UPPER GASTROINTESTINAL ENDOSCOPY N/A 03/07/2022    Dr Chika Posey, W EUS,  nodular area at end of rugal gold in distal stomach body    VASCULAR SURGERY  04/21/2015    Mindy BESS Ultrasound guided access of left common femoral artery. Aortogram.Diagnostic right lower extremity arteriogram.Radiologic supervision and interpretation.     VASCULAR SURGERY  01/13/2015    Mindy Burr M.D Atherectomy,angioplasty,and stenting of left superficial femoral artery. VASCULAR SURGERY  2014    Ximena Lobato M.D. Ultrasound-guided access of right common femoral artery. Aortogram.Left lower extremity arteriogram.Atherectomy and angioplasty of left superficial femoral artery. Radiologic supervision and interpretation. VASCULAR SURGERY  2013    Ximena BESS Aortogram.Multistation arteriogram right lower extremity. Laser atherectomy and angioplasty of right superficial femoral artery. Selective catheterization of right tibioperoneal trunk. Angioplasty of peroneal artery and tibioperoneal trunk. VASCULAR SURGERY  10/30/2018    [facility]. Ultrasound guided cannulation of right internal vein. Placement of right internal jugular vein tunneled dialysis catheter bard equistream xk 23cm tip to cuff    VASCULAR SURGERY  2018    [facility]. Removal of tunneled dilaysis catheter right internal jugular vein. SOCIAL HISTORY:  Social History     Socioeconomic History    Marital status:      Spouse name: None    Number of children: None    Years of education: None    Highest education level: None   Tobacco Use    Smoking status: Former     Packs/day: 2.00     Years: 48.00     Pack years: 96.00     Types: Cigarettes     Quit date: 6/3/2003     Years since quittin.4    Smokeless tobacco: Never   Vaping Use    Vaping Use: Never used   Substance and Sexual Activity    Alcohol use:  Yes     Alcohol/week: 12.0 standard drinks     Types: 12 Glasses of wine per week     Comment: 1 glasses of wine every night    Drug use: No    Sexual activity: Yes     Partners: Female        FAMILY HISTORY:  Family History   Problem Relation Age of Onset    Colon Cancer Father     Diabetes Brother     Colon Polyps Neg Hx     Liver Cancer Neg Hx     Liver Disease Neg Hx     Esophageal Cancer Neg Hx     Rectal Cancer Neg Hx     Stomach Cancer Neg Hx          ALLERGIES:  Allergies   Allergen Reactions    Eliquis [Apixaban] Other (See Comments) \"almost bled to death\"    Promethazine Hcl Other (See Comments)     He became encephalopathic for several hours and was not responsive.         PRIOR TO ADMISSION MEDS:  Medications Prior to Admission: amiodarone (CORDARONE) 200 MG tablet, TAKE 1 TABLET BY MOUTH DAILY  glipiZIDE (GLUCOTROL XL) 5 MG extended release tablet, Take 1.25 mg by mouth daily Takes a half a day  bumetanide (BUMEX) 1 MG tablet, Take 2 tablets by mouth daily  metOLazone (ZAROXOLYN) 2.5 MG tablet, Take 1 tablet by mouth daily (Patient taking differently: Take 2.5 mg by mouth daily as needed )  mirabegron (MYRBETRIQ) 25 MG TB24, Take 1 tablet by mouth daily  TRULANCE 3 MG TABS, TAKE 1 TABLET BY MOUTH DAILY  [DISCONTINUED] UNABLE TO FIND, Take 20 mg by mouth daily trosopium  (Patient not taking: Reported on 11/21/2022)  tamsulosin (FLOMAX) 0.4 MG capsule, Take 2 capsules by mouth daily  pantoprazole (PROTONIX) 20 MG tablet, Take 40 mg by mouth daily  (Patient not taking: Reported on 11/21/2022)  SENNA PLUS 8.6-50 MG per tablet,   atorvastatin (LIPITOR) 20 MG tablet, Take 1 tablet by mouth daily  metoprolol succinate (TOPROL XL) 100 MG extended release tablet, Take 1 tablet by mouth daily  NIFEdipine (ADALAT CC) 60 MG extended release tablet, Take 1 tablet by mouth daily  ferrous sulfate (IRON 325) 325 (65 Fe) MG tablet, Take 1 tablet by mouth 2 times daily (with meals) (Patient not taking: Reported on 29/78/4235)  folic acid (FOLVITE) 1 MG tablet, Take 1 tablet by mouth daily  vitamin D (ERGOCALCIFEROL) 1.25 MG (02938 UT) CAPS capsule, Take 1 capsule by mouth once a week (Patient taking differently: Take 50,000 Units by mouth once a week Mondays)  OXYGEN, Inhale 2 L into the lungs continuous  [DISCONTINUED] aspirin 81 MG EC tablet, Take 1 tablet by mouth daily (Patient not taking: Reported on 11/21/2022)     REVIEW OF SYSTEMS:  Constitutional:  No fevers, chills, nausea, vomiting, + tiredness & fatigue   Head:  No head injury, facial trauma Eyes:  No acute visual changes, exudate, trauma   Ears:  No acute hearing loss, earaches   Nose: No nasal discharge, epistaxis   Neck: No new hoarseness, voice change, or new masses   Lungs:   No hemoptysis, pleurisy, c/o dyspnea and wheezing   Heart:  No chest pressure with exertion, palpitations,    Abdomen:   No new masses, no bright red blood per rectum   Extremities: No acute pain while ambulating, no new lesions   Skin: No new changes in skin color, no rashes or lesions   Neurologic: No new motor or sensory changes     14 point review of systems addressed with patient which is essentially negative except as specifically addressed above:    PHYSICAL EXAM:  BP (!) 130/57   Pulse 58   Temp 96.8 °F (36 °C) (Temporal)   Resp 22   Ht 6' (1.829 m)   Wt 229 lb (103.9 kg)   SpO2 94%   BMI 31.06 kg/m²   I/O this shift:   In: 18 [P.O.:480]  Out: 600 [Urine:600]      PHYSICAL EXAMINATION:    Vital Signs: Please see the chart   BENITO:  Awake, alert, oriented x 3, patient appears tired and fatigued   Head/Eyes:  Normocephalic, atraumatic, EOMI and PERRLA bilaterally   ENT: Moist mucous membranes, nasal passages clear   Neck: Supple, full range of motion, no carotid bruit, trachea midline   Respiratory:   Bilateral fair air entry in both lung fields, mild B/L crackles, rales and wheezes symmetric expansion of chest   Cardiovascular:  Regular rate and rhythm, S1+S2+0, no murmurs/rubs   Urology: No bilateral CVA tenderness, no suprapubic tenderness   Abdomen:   Soft, non-tender, bowel sounds +ve, no organomegaly   Muscle/Joints: Moves all, full range of motion, no muscle spasms   Extremities: No clubbing, no cyanosis, no calf tenderness, no edema   Pulses: 2+ bilaterally, symmetrical   Skin: Warm, dry, no pallor/cyanosis/jaundice, no rashes/lesions   Neurologic: Awake, alert, oriented x 3, cranial nerves II-XII intact, no focal neurological deficits, sensory system intact   Psychiatric: Normal mood, non-suicidal LABORATORY DATA:    CBC:  Recent Labs     11/26/22 0600   WBC 18.3*   HGB 11.4*   HCT 36.8*        BMP:  Recent Labs     11/26/22  0600 11/26/22 0624     --    K 4.6 4.7     --    CO2 18*  --    BUN 57*  --    CREATININE 2.9*  --    CALCIUM 9.2  --      Recent Labs     11/26/22 0600   AST 18   ALT 24   BILITOT 1.3*   ALKPHOS 92     Coag Panel: No results for input(s): INR, PROTIME, APTT in the last 72 hours. Cardiac Enzymes:   Recent Labs     11/26/22 0600   TROPONINI 0.04*     ABGs:  Lab Results   Component Value Date/Time    PHART 7.270 11/26/2022 06:24 AM    PO2ART 63.0 11/26/2022 06:24 AM    HCV8GII 45.0 11/26/2022 06:24 AM     Urinalysis:  Lab Results   Component Value Date/Time    NITRU Negative 11/26/2022 01:46 PM    WBCUA 5 11/26/2022 01:46 PM    BACTERIA NEGATIVE 11/26/2022 01:46 PM    RBCUA 1 11/26/2022 01:46 PM    BLOODU Negative 11/26/2022 01:46 PM    SPECGRAV 1.009 11/26/2022 01:46 PM    GLUCOSEU Negative 11/26/2022 01:46 PM     A1C: No results for input(s): LABA1C in the last 72 hours. ABG:  Recent Labs     11/26/22 0624   PHART 7.270*   WRM9KNR 45.0   PO2ART 63.0*   SZM3TIH 20.7*   BEART -6.1*   HGBAE 11.1*   A2HCYRFT 90.8   CARBOXHGBART 2.0       EKG:   Please see chart      IMAGING:  XR CHEST PORTABLE    Result Date: 11/26/2022  1. Right lower lung airspace disease which may be infectious in nature. 2. Mild interstitial changes in the lungs which can be seen with mild interstitial pulmonary edema. Electronically signed by Jazmine Hester MD on 11-26-22 at 0470        Assessment and Plan:    Principal Problem:    CAP (community acquired pneumonia) due to Chlamydia species   Rocephin   Zithromax  Active Problems:    Essential hypertension   Continue metoprolol    CHF (congestive heart failure) (HCC)/Chronic diast   Iv lasix 60 in ed   Strict I and O   Daily weight   Daily lab    Chronic respiratory failure with hypoxia and hypercapnia ([facility])   Bipap/cpap   O2 3l nc at baseline    Chronic kidney disease   Daily lab   Avoid nephrotoxic agents   T2DM   Hypoglycemic protocol   Accucheks ac and hs   Low dose insulin protocol  Hx a flutter   Amiodoron   telemetry  Resolved Problems:    CAP (community acquired pneumonia)       Patient  is on DVT prophylaxis  Current medications reviewed  Lab work reviewed  Radiology/Chest x-ray films reviewed  Discussed with the nurse and addressed all questions/concerns  Discussed with Patient and/or Family at the bedside in detail . .. they verbalize understanding and agree with the management plan. Attestation:  Inpatient status is used for patients with an expected LOS extending past two midnights due to medical therapy and/or critical care needs  . .. all other patients are placed under OBServation status. ELISABETH Rodriguez CNP  3:21 PM 11/26/2022      DISCLAIMER: This note was created with electronic voice recognition which does have occasional errors. If you have any questions regarding the content within the note please do not hesitate to contact me. .. Thanks.

## 2022-11-26 NOTE — PROGRESS NOTES
Guerita Nicholson arrived to room # 35 [id number]. Presented with: SOB  Mental Status: Patient is oriented. Vitals:    11/26/22 1030   BP:    Pulse:    Resp:    Temp:    SpO2: 99%     Patient safety contract and falls prevention contract reviewed with patient Yes. Oriented Patient to room. Call light within reach. Yes.   Needs, issues or concerns expressed at this time: no.      Electronically signed by Evelyne Olivia RN on 11/26/2022 at 10:57

## 2022-11-26 NOTE — ED PROVIDER NOTES
[facility]  eMERGENCY dEPARTMENT eNCOUnter      Pt Name: Mulugeta Valenzuela  MRN: [id number]  [place] 1940  Date of evaluation: 11/26/2022  Provider: Sana Acevedo MD    [address] [place] [address]       Chief Complaint   Patient presents with    Shortness of Breath     Pt presents with shortness of breath for several days. Pt reports that he is out of his Lasix for 5-6 days. EMS reports 89% on Cpap at home. Pt presents on NRB. Fall     Left leg pain. Pt had fall coming down steps when EMS arrived. Pt denies LOC. HISTORY OF PRESENT ILLNESS   (Location/Symptom, Timing/Onset,Context/Setting, Quality, Duration, Modifying Factors, Severity)  Note limiting factors. Mulugeta Valenzuela is a 81 y.o. male who presents to the emergency department with shortness of breath. The patient states he has been out of his diuretic for about 7 days. He was waiting on his doctor to call in a refill however the request was delayed due to the holidays. The patient states that he has new bilateral lower extremity swelling and feels like his lungs are full of fluid. He feels like he is smothering. He denies any fever. No chest pain. No history of blood clots. Wears 3L oxygen and has cpap at night. RN reports fall. Pt states he became off balance going down his front steps with EMS and basically sat down. He did not hit his head and he denies any injury. Currently denies any leg pain. HPI    NursingNotes were reviewed. REVIEW OF SYSTEMS    (2-9 systems for level 4, 10 or more for level 5)     Review of Systems   Constitutional:  Negative for chills and fever. HENT:  Negative for rhinorrhea and sore throat. Respiratory:  Positive for shortness of breath. Cardiovascular:  Positive for leg swelling. Negative for chest pain. Gastrointestinal:  Negative for abdominal pain, diarrhea, nausea and vomiting. Genitourinary:  Negative for difficulty urinating.    Musculoskeletal:  Negative for back pain and neck pain.   Skin:  Negative for rash. Neurological:  Negative for weakness and headaches. Psychiatric/Behavioral:  Negative for confusion. A complete review of systems was performed and is negative except as noted above in the HPI. PAST MEDICAL HISTORY     Past Medical History:   Diagnosis Date    Acute liver failure without hepatic coma 10/23/2018    Back pain     \"with tired legs as a result\"    Bladder cancer ([facility] 12/19/2018    Blood circulation, collateral     Carotid arterial disease (HCC)     recent surgery    CKD (chronic kidney disease), 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/2018    Constipation     COPD with acute lower respiratory infection ([facility] 02/24/2021    Epigastric discomfort     GERD (gastroesophageal reflux disease)     Hyperlipidemia     Hypertension     Hypertension     Pacemaker     Palliative care patient 10/23/2018    Pneumonia due to infectious organism 11/06/2018    Primary osteoarthritis of left knee 10/14/2018    PVD (peripheral vascular disease) (HCC)     Tremor     Tremor on Right side x 1-2 weeks per stepdaughter    Type 2 diabetes mellitus with complication, without long-term current use of insulin ([facility] [facility] 01/21/2021         SURGICAL HISTORY       Past Surgical History:   Procedure Laterality Date    BACK SURGERY      CARDIAC CATHETERIZATION  03/23/2021    100% RCA    COLONOSCOPY  2007?     CYSTOSCOPY Bilateral 12/19/2018    CYSTOSCOPY, BIOSPY FULGURATION OF BLADDER TUMOR POSSIBLE TURBT, RETROGRADE PYELOGRAM performed by Bonne Felty, MD at [address] [facility] COLONOSCOPY FLX DX W/COLLJ SPEC WHEN PFRMD N/A 09/11/2017    Dr Sasha Richmond internal hemorrhoids, diverticular disease-HP-No recall (age)    DC REVISE MEDIAN N/CARPAL TUNNEL SURG Left 07/18/2018    OPEN CARPAL TUNNEL RELEASE performed by Naomi Devi MD at [address] Left 08/28/2018    LEFT CAROTID ENDARTERECTOMY WITH VEIN PATCH ANGIOPLASTY AND COMPLETION ANGIOGRAM performed by Daphne Diamond MD at [facility] OR    KS TOTAL KNEE ARTHROPLASTY Left 10/15/2018    LEFT COMPLEX TOTAL KNEE ARTHROPLASTY performed by Meggan Lopez MD at [address] ENDOSCOPY N/A 11/18/2021    Dr Tali Cardozo, Sub prep lg amount of solid and semisolid food/Medication in stomach body/antrum/pylorus, stomach lumen appeared to distended w air insufflation and collapse upon suction,   inadequate exam sugg of gastroparesis, repeat in 10-14 days    UPPER GASTROINTESTINAL ENDOSCOPY N/A 01/06/2022    Dr Tali Cardozo, distal stomach body likely GIST nodule-Submucosal fundic body gastric mucosa, Benign, (-)Hpylori, (-)Sprue    UPPER GASTROINTESTINAL ENDOSCOPY N/A 03/07/2022    Dr aTli Cardozo, EUS submucosal gastric nodule, otherwise normal, 1 year recall    UPPER GASTROINTESTINAL ENDOSCOPY N/A 03/07/2022    Dr Tali Cardozo, W EUS,  nodular area at end of rugal gold in distal stomach body    VASCULAR SURGERY  04/21/2015    Na BESS Ultrasound guided access of left common femoral artery. Aortogram.Diagnostic right lower extremity arteriogram.Radiologic supervision and interpretation. VASCULAR SURGERY  01/13/2015    Na Valencia M.D Atherectomy,angioplasty,and stenting of left superficial femoral artery. VASCULAR SURGERY  03/11/2014    Na Valencia M.D. Ultrasound-guided access of right common femoral artery. Aortogram.Left lower extremity arteriogram.Atherectomy and angioplasty of left superficial femoral artery. Radiologic supervision and interpretation. VASCULAR SURGERY  01/18/2013    Na BESS Aortogram.Multistation arteriogram right lower extremity. Laser atherectomy and angioplasty of right superficial femoral artery. Selective catheterization of right tibioperoneal trunk. Angioplasty of peroneal artery and tibioperoneal trunk. VASCULAR SURGERY  10/30/2018    [facility]. Ultrasound guided cannulation of right internal vein. Placement of right internal jugular vein tunneled dialysis catheter bard tian xk 23cm tip to cuff    VASCULAR SURGERY  12/17/2018    [facility]. Removal of tunneled dilaysis catheter right internal jugular vein.          CURRENT MEDICATIONS       Previous Medications    AMIODARONE (CORDARONE) 200 MG TABLET    TAKE 1 TABLET BY MOUTH DAILY    ASPIRIN 81 MG EC TABLET    Take 1 tablet by mouth daily    ATORVASTATIN (LIPITOR) 20 MG TABLET    Take 1 tablet by mouth daily    BUMETANIDE (BUMEX) 1 MG TABLET    Take 2 tablets by mouth daily    FERROUS SULFATE (IRON 325) 325 (65 FE) MG TABLET    Take 1 tablet by mouth 2 times daily (with meals)    FOLIC ACID (FOLVITE) 1 MG TABLET    Take 1 tablet by mouth daily    GLIPIZIDE (GLUCOTROL XL) 5 MG EXTENDED RELEASE TABLET    Take 5 mg by mouth daily Takes a half a day    METOLAZONE (ZAROXOLYN) 2.5 MG TABLET    Take 1 tablet by mouth daily    METOPROLOL SUCCINATE (TOPROL XL) 100 MG EXTENDED RELEASE TABLET    Take 1 tablet by mouth daily    MIRABEGRON (MYRBETRIQ) 25 MG TB24    Take 1 tablet by mouth daily    NIFEDIPINE (ADALAT CC) 60 MG EXTENDED RELEASE TABLET    Take 1 tablet by mouth daily    OXYGEN    Inhale 2 L into the lungs continuous    PANTOPRAZOLE (PROTONIX) 20 MG TABLET    Take 40 mg by mouth daily     SENNA PLUS 8.6-50 MG PER TABLET        TAMSULOSIN (FLOMAX) 0.4 MG CAPSULE    Take 2 capsules by mouth daily    TRULANCE 3 MG TABS    TAKE 1 TABLET BY MOUTH DAILY    UNABLE TO FIND    Take 20 mg by mouth daily trosopium     VITAMIN D (ERGOCALCIFEROL) 1.25 MG (14666 UT) CAPS CAPSULE    Take 1 capsule by mouth once a week       ALLERGIES     Eliquis [apixaban] and Promethazine hcl    FAMILY HISTORY       Family History   Problem Relation Age of Onset    Colon Cancer Father     Diabetes Brother     Colon Polyps Neg Hx     Liver Cancer Neg Hx     Liver Disease Neg Hx     Esophageal Cancer Neg Hx     Rectal Cancer Neg Hx     Stomach Cancer Neg Hx           SOCIAL HISTORY       Social History     Socioeconomic History    Marital status:      Spouse name: None    Number of children: None    Years of education: None    Highest education level: None   Tobacco Use    Smoking status: Former     Packs/day: 2.00     Years: 48.00     Pack years: 96.00     Types: Cigarettes     Quit date: 6/3/2003     Years since quittin.4    Smokeless tobacco: Never   Vaping Use    Vaping Use: Never used   Substance and Sexual Activity    Alcohol use: Yes     Alcohol/week: 12.0 standard drinks     Types: 12 Glasses of wine per week     Comment: 1 glasses of wine every night    Drug use: No    Sexual activity: Yes     Partners: Female       SCREENINGS    Dago Coma Scale  Eye Opening: Spontaneous  Best Verbal Response: Oriented  Best Motor Response: Obeys commands  Dago Coma Scale Score: 15        PHYSICAL EXAM    (up to 7 for level 4, 8 or more for level 5)     ED Triage Vitals [22 0604]   BP Temp Temp Source Heart Rate Resp SpO2 Height Weight   (!) 140/100 98 °F (36.7 °C) Temporal 60 (!) 32 98 % -- --       Physical Exam  Constitutional:       General: He is not in acute distress. Appearance: He is well-developed. He is ill-appearing. He is not diaphoretic. HENT:      Head: Normocephalic and atraumatic. Eyes:      Pupils: Pupils are equal, round, and reactive to light. Cardiovascular:      Rate and Rhythm: Normal rate and regular rhythm. Heart sounds: Normal heart sounds. Pulmonary:      Effort: Tachypnea and respiratory distress present. Breath sounds: Decreased breath sounds and rales present. Abdominal:      General: Bowel sounds are normal. There is no distension. Palpations: Abdomen is soft. Tenderness: There is no abdominal tenderness. Musculoskeletal:         General: Normal range of motion. Cervical back: Normal range of motion and neck supple. Right lower leg: Edema present. Left lower leg: Edema present. Skin:     General: Skin is warm and dry. Findings: No rash. Neurological:      Mental Status: He is alert and oriented to person, place, and time. Cranial Nerves: No cranial nerve deficit. Motor: No abnormal muscle tone. Coordination: Coordination normal.   Psychiatric:         Behavior: Behavior normal.       DIAGNOSTIC RESULTS     EKG: All EKG's are interpreted by the Emergency Department Physician who either signs or Co-signs this chart in the absence of a cardiologist.    Afib paced rate 62 no definite  acute st abnormality similar to prior    RADIOLOGY:   Non-plain film images such as CT, Ultrasound and MRI are read by the radiologist. Plainradiographic images are visualized and preliminarily interpreted by the emergency physician with the below findings:        Interpretation per the Radiologist below, if available at the time of this note:    XR CHEST PORTABLE   Final Result   1. Right lower lung airspace disease which may be infectious in nature. 2. Mild interstitial changes in the lungs which can be seen with mild interstitial pulmonary edema. Electronically signed by Dax Montano MD on 11-26-22 at 6231            ED BEDSIDE ULTRASOUND:   Performed by ED Physician - none    LABS:  Labs Reviewed   BLOOD GAS, ARTERIAL - Abnormal; Notable for the following components:       Result Value    pH, Arterial 7.270 (*)     pO2, Arterial 63.0 (*)     HCO3, Arterial 20.7 (*)     Base Excess, Arterial -6.1 (*)     Hemoglobin, Art, Extended 11.1 (*)     All other components within normal limits    Narrative:     CALL  [facility] tel. ,  lotus george rn er, 11/26/2022 06:26, by [address] [place] [address] - Abnormal; Notable for the following components:    Pro-BNP 7,592 (*)     All other components within normal limits   CBC WITH AUTO DIFFERENTIAL - Abnormal; Notable for the following components:    WBC 18.3 (*)     RBC 3.87 (*)     Hemoglobin 11.4 (*)     Hematocrit 36.8 (*)     MCV 95.1 (*)     MCHC 31.0 (*)     Neutrophils % 77.6 (*)     Lymphocytes % 11.4 (*)     Neutrophils Absolute 14.2 (*)     Monocytes Absolute 1.70 (*)     All other components within normal limits   COMPREHENSIVE METABOLIC PANEL - Abnormal; Notable for the following components:    CO2 18 (*)     Glucose 208 (*)     BUN 57 (*)     Creatinine 2.9 (*)     Est, Glom Filt Rate 21 (*)     Total Bilirubin 1.3 (*)     All other components within normal limits   LACTIC ACID - Abnormal; Notable for the following components:    Lactic Acid 2.0 (*)     All other components within normal limits    Narrative:     CALL  [facility] tel. ,  Chemistry results called to and read back by Carol Ann rock, 11/26/2022  07:01, by HOLA   TROPONIN - Abnormal; Notable for the following components:    Troponin 0.04 (*)     All other components within normal limits   RESPIRATORY PANEL, MOLECULAR, WITH COVID-19   CULTURE, BLOOD 1   CULTURE, BLOOD 2   URINALYSIS WITH REFLEX TO CULTURE       All other labs were within normal range or not returned as of this dictation. EMERGENCY DEPARTMENT COURSE and DIFFERENTIALDIAGNOSIS/MDM:   Vitals:    Vitals:    11/26/22 0604 11/26/22 0611 11/26/22 0641   BP: (!) 140/100  (!) 125/55   Pulse: 60  73   Resp: (!) 32  24   Temp: 98 °F (36.7 °C)     TempSrc: Temporal     SpO2: 98% (!) 85% 93%       MDM    Pt placed on bipap due to mild respiratory distress and evidence of volume overload. Pt now comfortable, on bipap, sat 99%. Given lasix  Wbc 18, lactic 2, no recent hospitlization, covered for CAP. D/w hospitalist for admission     CONSULTS:  None    PROCEDURES:  Unless otherwise notedbelow, none     Procedures    FINAL IMPRESSION     1. Acute on chronic congestive heart failure, unspecified heart failure type ([facility]    2.  Community acquired pneumonia of right lower lobe of lung          DISPOSITION/PLAN   DISPOSITION Decision To Admit 11/26/2022 07:47:04 AM      PATIENT REFERRED TO:  @FUP@    DISCHARGE MEDICATIONS:  New Prescriptions    No medications on file          (Please note that portions of this note were completed with a voice recognition program.  Efforts were made to edit the dictations butoccasionally words are mis-transcribed.)    Marge Nichols MD (electronically signed)  AttendingEmergency Physician         Marge Nichols MD  11/26/22 1100

## 2022-11-26 NOTE — ED NOTES
PT o2 sat 85 % on 2 L nasal cannula, PT has labored breathing noted.  Dr. Anmol Tatum notified      [facility]  11/26/22 3006

## 2022-11-27 PROBLEM — J18.9 COMMUNITY ACQUIRED PNEUMONIA: Status: ACTIVE | Noted: 2022-11-27

## 2022-11-27 LAB
ALBUMIN SERPL-MCNC: 3.5 G/DL (ref 3.5–5.2)
ALP BLD-CCNC: 78 U/L (ref 40–130)
ALT SERPL-CCNC: 22 U/L (ref 5–41)
ANION GAP SERPL CALCULATED.3IONS-SCNC: 12 MMOL/L (ref 7–19)
AST SERPL-CCNC: 15 U/L (ref 5–40)
BASOPHILS ABSOLUTE: 0 K/UL (ref 0–0.2)
BASOPHILS RELATIVE PERCENT: 0.3 % (ref 0–1)
BILIRUB SERPL-MCNC: 1 MG/DL (ref 0.2–1.2)
BUN BLDV-MCNC: 69 MG/DL (ref 8–23)
CALCIUM SERPL-MCNC: 9.2 MG/DL (ref 8.8–10.2)
CHLORIDE BLD-SCNC: 104 MMOL/L (ref 98–111)
CO2: 22 MMOL/L (ref 22–29)
CREAT SERPL-MCNC: 3.3 MG/DL (ref 0.5–1.2)
EOSINOPHILS ABSOLUTE: 0 K/UL (ref 0–0.6)
EOSINOPHILS RELATIVE PERCENT: 0 % (ref 0–5)
GFR SERPL CREATININE-BSD FRML MDRD: 18 ML/MIN/{1.73_M2}
GLUCOSE BLD-MCNC: 102 MG/DL (ref 70–99)
GLUCOSE BLD-MCNC: 103 MG/DL (ref 70–99)
GLUCOSE BLD-MCNC: 116 MG/DL (ref 74–109)
GLUCOSE BLD-MCNC: 118 MG/DL (ref 70–99)
GLUCOSE BLD-MCNC: 124 MG/DL (ref 70–99)
HCT VFR BLD CALC: 30.1 % (ref 42–52)
HEMOGLOBIN: 9.5 G/DL (ref 14–18)
IMMATURE GRANULOCYTES #: 0.1 K/UL
LACTIC ACID: 0.9 MMOL/L (ref 0.5–1.9)
LYMPHOCYTES ABSOLUTE: 0.8 K/UL (ref 1.1–4.5)
LYMPHOCYTES RELATIVE PERCENT: 8.1 % (ref 20–40)
MCH RBC QN AUTO: 29.2 PG (ref 27–31)
MCHC RBC AUTO-ENTMCNC: 31.6 G/DL (ref 33–37)
MCV RBC AUTO: 92.6 FL (ref 80–94)
MONOCYTES ABSOLUTE: 0.8 K/UL (ref 0–0.9)
MONOCYTES RELATIVE PERCENT: 8.2 % (ref 0–10)
NEUTROPHILS ABSOLUTE: 7.9 K/UL (ref 1.5–7.5)
NEUTROPHILS RELATIVE PERCENT: 82.7 % (ref 50–65)
PDW BLD-RTO: 14.3 % (ref 11.5–14.5)
PERFORMED ON: ABNORMAL
PLATELET # BLD: 114 K/UL (ref 130–400)
PMV BLD AUTO: 10.5 FL (ref 9.4–12.4)
POTASSIUM REFLEX MAGNESIUM: 4.1 MMOL/L (ref 3.5–5)
RBC # BLD: 3.25 M/UL (ref 4.7–6.1)
SODIUM BLD-SCNC: 138 MMOL/L (ref 136–145)
TOTAL PROTEIN: 5.5 G/DL (ref 6.6–8.7)
WBC # BLD: 9.5 K/UL (ref 4.8–10.8)

## 2022-11-27 PROCEDURE — 94660 CPAP INITIATION&MGMT: CPT

## 2022-11-27 PROCEDURE — 2500000003 HC RX 250 WO HCPCS: Performed by: NURSE PRACTITIONER

## 2022-11-27 PROCEDURE — 6370000000 HC RX 637 (ALT 250 FOR IP): Performed by: NURSE PRACTITIONER

## 2022-11-27 PROCEDURE — 2140000000 HC CCU INTERMEDIATE R&B

## 2022-11-27 PROCEDURE — 85025 COMPLETE CBC W/AUTO DIFF WBC: CPT

## 2022-11-27 PROCEDURE — 2700000000 HC OXYGEN THERAPY PER DAY

## 2022-11-27 PROCEDURE — 94640 AIRWAY INHALATION TREATMENT: CPT

## 2022-11-27 PROCEDURE — 36415 COLL VENOUS BLD VENIPUNCTURE: CPT

## 2022-11-27 PROCEDURE — 82947 ASSAY GLUCOSE BLOOD QUANT: CPT

## 2022-11-27 PROCEDURE — 83605 ASSAY OF LACTIC ACID: CPT

## 2022-11-27 PROCEDURE — 6360000002 HC RX W HCPCS: Performed by: NURSE PRACTITIONER

## 2022-11-27 PROCEDURE — 80053 COMPREHEN METABOLIC PANEL: CPT

## 2022-11-27 PROCEDURE — 2580000003 HC RX 258: Performed by: NURSE PRACTITIONER

## 2022-11-27 PROCEDURE — 94760 N-INVAS EAR/PLS OXIMETRY 1: CPT

## 2022-11-27 RX ADMIN — SODIUM CHLORIDE, PRESERVATIVE FREE 1000 MG: 5 INJECTION INTRAVENOUS at 09:01

## 2022-11-27 RX ADMIN — NIFEDIPINE 60 MG: 60 TABLET, EXTENDED RELEASE ORAL at 09:00

## 2022-11-27 RX ADMIN — SODIUM CHLORIDE, PRESERVATIVE FREE 10 ML: 5 INJECTION INTRAVENOUS at 09:02

## 2022-11-27 RX ADMIN — Medication 2000 UNITS: at 09:00

## 2022-11-27 RX ADMIN — ENOXAPARIN SODIUM 30 MG: 100 INJECTION SUBCUTANEOUS at 09:00

## 2022-11-27 RX ADMIN — IPRATROPIUM BROMIDE AND ALBUTEROL SULFATE 1 AMPULE: 2.5; .5 SOLUTION RESPIRATORY (INHALATION) at 05:58

## 2022-11-27 RX ADMIN — PANTOPRAZOLE SODIUM 40 MG: 40 TABLET, DELAYED RELEASE ORAL at 09:00

## 2022-11-27 RX ADMIN — AMIODARONE HYDROCHLORIDE 200 MG: 200 TABLET ORAL at 09:01

## 2022-11-27 RX ADMIN — BUMETANIDE 2 MG: 0.25 INJECTION, SOLUTION INTRAMUSCULAR; INTRAVENOUS at 06:27

## 2022-11-27 RX ADMIN — IPRATROPIUM BROMIDE AND ALBUTEROL SULFATE 1 AMPULE: 2.5; .5 SOLUTION RESPIRATORY (INHALATION) at 14:00

## 2022-11-27 RX ADMIN — IPRATROPIUM BROMIDE AND ALBUTEROL SULFATE 1 AMPULE: 2.5; .5 SOLUTION RESPIRATORY (INHALATION) at 19:15

## 2022-11-27 RX ADMIN — FOLIC ACID 1 MG: 1 TABLET ORAL at 09:00

## 2022-11-27 RX ADMIN — SODIUM CHLORIDE, PRESERVATIVE FREE 10 ML: 5 INJECTION INTRAVENOUS at 20:39

## 2022-11-27 RX ADMIN — ATORVASTATIN CALCIUM 20 MG: 20 TABLET, FILM COATED ORAL at 09:00

## 2022-11-27 RX ADMIN — ASPIRIN 81 MG: 81 TABLET, COATED ORAL at 09:00

## 2022-11-27 RX ADMIN — METOPROLOL SUCCINATE 100 MG: 50 TABLET, EXTENDED RELEASE ORAL at 09:00

## 2022-11-27 RX ADMIN — IPRATROPIUM BROMIDE AND ALBUTEROL SULFATE 1 AMPULE: 2.5; .5 SOLUTION RESPIRATORY (INHALATION) at 10:09

## 2022-11-27 RX ADMIN — BUMETANIDE 2 MG: 0.25 INJECTION, SOLUTION INTRAMUSCULAR; INTRAVENOUS at 17:42

## 2022-11-27 RX ADMIN — SODIUM CHLORIDE, PRESERVATIVE FREE 10 ML: 5 INJECTION INTRAVENOUS at 06:27

## 2022-11-27 RX ADMIN — METOLAZONE 2.5 MG: 2.5 TABLET ORAL at 09:00

## 2022-11-27 RX ADMIN — AZITHROMYCIN MONOHYDRATE 500 MG: 250 TABLET ORAL at 09:00

## 2022-11-27 RX ADMIN — TAMSULOSIN HYDROCHLORIDE 0.8 MG: 0.4 CAPSULE ORAL at 09:00

## 2022-11-27 RX ADMIN — TROSPIUM CHLORIDE 20 MG: 20 TABLET, FILM COATED ORAL at 20:39

## 2022-11-27 ASSESSMENT — ENCOUNTER SYMPTOMS
SHORTNESS OF BREATH: 1
ABDOMINAL PAIN: 0
COUGH: 1
VOMITING: 0

## 2022-11-27 NOTE — PLAN OF CARE
Problem: Discharge Planning  Goal: Discharge to home or other facility with appropriate resources  Outcome: Progressing     Problem: Skin/Tissue Integrity  Goal: Absence of new skin breakdown  Description: 1. Monitor for areas of redness and/or skin breakdown  2. Assess vascular access sites hourly  3. Every 4-6 hours minimum:  Change oxygen saturation probe site  4. Every 4-6 hours:  If on nasal continuous positive airway pressure, respiratory therapy assess nares and determine need for appliance change or resting period.   Outcome: Progressing     Problem: ABCDS Injury Assessment  Goal: Absence of physical injury  11/26/2022 2325 by Cosme Johnston RN  Outcome: Progressing  11/26/2022 1814 by Monica Leary RN  Outcome: Progressing     Problem: Safety - Adult  Goal: Free from fall injury  11/26/2022 2325 by Cosme Johnston RN  Outcome: Progressing  11/26/2022 1814 by Monica Leary RN  Outcome: Progressing     Problem: Chronic Conditions and Co-morbidities  Goal: Patient's chronic conditions and co-morbidity symptoms are monitored and maintained or improved  11/26/2022 2325 by Cosme Johnston RN  Outcome: Progressing  11/26/2022 1814 by Monica Leary RN  Outcome: Progressing

## 2022-11-27 NOTE — PROGRESS NOTES
Daily Progress Note    Date:2022  Patient: Micheal Oates  : 1940  KOD:[id number]  CODE:Full Code No additional code details  Abhijit Cha MD    Admit Date: 2022  6:00 AM   LOS: 1 day     Chief Complaint   Patient presents with    Shortness of Breath     Pt presents with shortness of breath for several days. Pt reports that he is out of his Lasix for 5-6 days. EMS reports 89% on Cpap at home. Pt presents on NRB. Fall     Left leg pain. Pt had fall coming down steps when EMS arrived. Pt denies LOC. Subjective: Dyspnea improved since admission. He also reports improvement in lower extremity edema. He asked if he can be discharged from the hospital today. After we had a discussion, he was agreeable to stay until tomorrow. Good urine output, -1.36 L yesterday. Hospital Summary: Micheal Oates is an 81 y.o. male with  PMH of CHF, CKD, COPD, GERD, PVD, Bladder cancer and T2Dm. He presneted to the ED acutely short of breath. Hes been out of his diuretic for 7 days. His legs have become acutely edematous, and he is SOA. He denies fever, or chest pain. He is on 2-3 liters O2 at baseline and wears cpap at night. He fell getting to the EMS personal.  He fell down his step and landed on his buttocks. Denies any injury, did not hit his head. Admitted to hospitalist service for acute on chronic diastolic CHF. Diuresed with Bumex 2 mg IV BID and Metolazone 2.5 mg daily. Review of Systems   Constitutional:  Negative for chills and fever. Respiratory:  Positive for cough and shortness of breath (improving). Cardiovascular:  Negative for chest pain and palpitations. Gastrointestinal:  Negative for abdominal pain and vomiting.      Objective:      Vital signs in last 24 hours:  Patient Vitals for the past 24 hrs:   BP Temp Temp src Pulse Resp SpO2   22 1130 (!) 136/57 98.6 °F (37 °C) Temporal 66 20 95 %   22 0651 133/60 97.7 °F (36.5 °C) Temporal 62 21 93 %   11/27/22 0222 127/63 97.5 °F (36.4 °C) Temporal 58 23 97 %   11/26/22 2212 (!) 136/57 97.7 °F (36.5 °C) Temporal 62 22 98 %   11/26/22 1804 (!) 116/59 98.1 °F (36.7 °C) Temporal 61 22 96 %       I/O last 3 completed shifts: In: 5 [P.O.:780; I.V.:10]  Out: 2150 [Urine:2150]  I/O this shift:  In: 120 [P.O.:120]  Out: 800 [Urine:800]    Physical Exam  Constitutional:       General: He is not in acute distress. Appearance: Normal appearance. He is not toxic-appearing. Comments: Lying in bed napping, CPAP in place   Cardiovascular:      Rate and Rhythm: Regular rhythm. Tachycardia present. Pulses: Normal pulses. Heart sounds: Normal heart sounds. Pulmonary:      Effort: Pulmonary effort is normal. No respiratory distress. Comments: Diminished bilaterally, no rhonchi or wheezes  Abdominal:      General: Abdomen is flat. There is no distension. Palpations: Abdomen is soft. Tenderness: There is no abdominal tenderness.    Musculoskeletal:      Comments: 1+ BLE pitting edema to mid shin, significantly improved from yesterday           Lab Review   Recent Results (from the past 24 hour(s))   POCT Glucose    Collection Time: 11/26/22  4:57 PM   Result Value Ref Range    POC Glucose 163 (H) 70 - 99 mg/dl    Performed on AccuChek    POCT Glucose    Collection Time: 11/26/22  8:23 PM   Result Value Ref Range    POC Glucose 162 (H) 70 - 99 mg/dl    Performed on AccuChek    Comprehensive Metabolic Panel w/ Reflex to MG    Collection Time: 11/27/22  2:28 AM   Result Value Ref Range    Sodium 138 136 - 145 mmol/L    Potassium reflex Magnesium 4.1 3.5 - 5.0 mmol/L    Chloride 104 98 - 111 mmol/L    CO2 22 22 - 29 mmol/L    Anion Gap 12 7 - 19 mmol/L    Glucose 116 (H) 74 - 109 mg/dL    BUN 69 (H) 8 - 23 mg/dL    Creatinine 3.3 (H) 0.5 - 1.2 mg/dL    Est, Glom Filt Rate 18 (A) >60    Calcium 9.2 8.8 - 10.2 mg/dL    Total Protein 5.5 (L) 6.6 - 8.7 g/dL    Albumin 3.5 3.5 - 5.2 g/dL    Total Bilirubin 1.0 0.2 - 1.2 mg/dL    Alkaline Phosphatase 78 40 - 130 U/L    ALT 22 5 - 41 U/L    AST 15 5 - 40 U/L   Lactic Acid    Collection Time: 11/27/22  2:28 AM   Result Value Ref Range    Lactic Acid 0.9 0.5 - 1.9 mmol/L   CBC with Auto Differential    Collection Time: 11/27/22  2:28 AM   Result Value Ref Range    WBC 9.5 4.8 - 10.8 K/uL    RBC 3.25 (L) 4.70 - 6.10 M/uL    Hemoglobin 9.5 (L) 14.0 - 18.0 g/dL    Hematocrit 30.1 (L) 42.0 - 52.0 %    MCV 92.6 80.0 - 94.0 fL    MCH 29.2 27.0 - 31.0 pg    MCHC 31.6 (L) 33.0 - 37.0 g/dL    RDW 14.3 11.5 - 14.5 %    Platelets 435 (L) 122 - 400 K/uL    MPV 10.5 9.4 - 12.4 fL    Neutrophils % 82.7 (H) 50.0 - 65.0 %    Lymphocytes % 8.1 (L) 20.0 - 40.0 %    Monocytes % 8.2 0.0 - 10.0 %    Eosinophils % 0.0 0.0 - 5.0 %    Basophils % 0.3 0.0 - 1.0 %    Neutrophils Absolute 7.9 (H) 1.5 - 7.5 K/uL    Immature Granulocytes # 0.1 K/uL    Lymphocytes Absolute 0.8 (L) 1.1 - 4.5 K/uL    Monocytes Absolute 0.80 0.00 - 0.90 K/uL    Eosinophils Absolute 0.00 0.00 - 0.60 K/uL    Basophils Absolute 0.00 0.00 - 0.20 K/uL   POCT Glucose    Collection Time: 11/27/22  7:15 AM   Result Value Ref Range    POC Glucose 103 (H) 70 - 99 mg/dl    Performed on AccuChek    POCT Glucose    Collection Time: 11/27/22 11:07 AM   Result Value Ref Range    POC Glucose 124 (H) 70 - 99 mg/dl    Performed on AccuChek    POCT Glucose    Collection Time: 11/27/22  4:00 PM   Result Value Ref Range    POC Glucose 102 (H) 70 - 99 mg/dl    Performed on AccuChek              Current Facility-Administered Medications:     sodium chloride flush 0.9 % injection 5-40 mL, 5-40 mL, IntraVENous, 2 times per day, Indira Expose, APRN - CNP, 10 mL at 11/27/22 0902    sodium chloride flush 0.9 % injection 5-40 mL, 5-40 mL, IntraVENous, PRN, Indira Expose, APRN - CNP, 10 mL at 11/27/22 0627    0.9 % sodium chloride infusion, , IntraVENous, PRN, Indira Expose, APRN - CNP    enoxaparin Sodium (LOVENOX) CNP, 0.8 mg at 11/27/22 0900    [START ON 12/2/2022] vitamin D (ERGOCALCIFEROL) capsule 50,000 Units, 50,000 Units, Oral, Weekly, Jacque Alyce, APRN - CNP    insulin lispro (HUMALOG) injection vial 0-4 Units, 0-4 Units, SubCUTAneous, TID WC, Jacque Alyce, APRN - CNP, 1 Units at 11/26/22 1239    insulin lispro (HUMALOG) injection vial 0-4 Units, 0-4 Units, SubCUTAneous, Nightly, Jacque Alyce, APRN - CNP    glucose chewable tablet 16 g, 4 tablet, Oral, PRN, Jacque Alyce, APRN - CNP    dextrose bolus 10% 125 mL, 125 mL, IntraVENous, PRN **OR** dextrose bolus 10% 250 mL, 250 mL, IntraVENous, PRN, Jacque Alyce, APRN - CNP    glucagon (rDNA) injection 1 mg, 1 mg, SubCUTAneous, PRN, Jacque Alyce, APRN - CNP    dextrose 10 % infusion, , IntraVENous, Continuous PRN, Jacque Alyce, APRN - CNP    Vitamin D (CHOLECALCIFEROL) tablet 2,000 Units, 2,000 Units, Oral, Daily, Jacque Alyce, APRN - CNP, 2,000 Units at 11/27/22 0900    bumetanide (BUMEX) injection 2 mg, 2 mg, IntraVENous, Q12H, Jacque Alyce, APRN - CNP, 2 mg at 11/27/22 2996      Imaging:  XR CHEST PORTABLE    Result Date: 11/26/2022  1. Right lower lung airspace disease which may be infectious in nature. 2. Mild interstitial changes in the lungs which can be seen with mild interstitial pulmonary edema. Electronically signed by Jazmine Hester MD on 11-26-22 at 1697         Assessment/Plan  Principal Problem:    Acute on chronic diastolic congestive heart failure ([address]  Active Problems:    Community acquired pneumonia    Essential hypertension    CHF (congestive heart failure) ([address]    Chronic respiratory failure with hypoxia and hypercapnia ([address]    Chronic kidney disease  Resolved Problems:    * No resolved hospital problems.  *     Acute on chronic diastolic congestive heart failure  - Echo (5/25/2022) showed EF 50 to 55%, mild MR, mild AS  --Did not take diuretic for 1 week  - Continue Bumex 2 mg IV twice daily, give metolazone 2.5 mg today, DC for tomorrow  -- Good UOP, net negative 1.3L yesterday  --Significant improvement in dyspnea and BLE edema  --Repeat BNP on day of discharge    CAP  -- CXR showed RLL opacity and interstitial edema, opacity could be from edema but will treat as CAP given his cough and elevated WBC of 18.3 on admission  -- Continue Ceftriaxone x5 days and Azithromycin x3 days  -- Transition to Cefdinir at discharge to complete 5 day course    CAD  -- Continue ASA, statin, Toprol Xl    CKD stage IV  -- Cr near baseline ~3  -- Is/Os  -- Avoid nephrotoxins    HTN  -- Nifedipine XL 60 mg daily    BPH with obstruction  -- Continue home Flomax       DVT prophylaxis: Lovenox    DISPOSITION: Home, likely tomorrow    Full Code No additional code details        Jones White MD 11/27/2022 4:30 PM

## 2022-11-28 VITALS
RESPIRATION RATE: 18 BRPM | BODY MASS INDEX: 29.01 KG/M2 | DIASTOLIC BLOOD PRESSURE: 59 MMHG | TEMPERATURE: 97 F | SYSTOLIC BLOOD PRESSURE: 131 MMHG | HEART RATE: 60 BPM | HEIGHT: 72 IN | WEIGHT: 214.2 LBS | OXYGEN SATURATION: 97 %

## 2022-11-28 LAB
ALBUMIN SERPL-MCNC: 3.5 G/DL (ref 3.5–5.2)
ALP BLD-CCNC: 85 U/L (ref 40–130)
ALT SERPL-CCNC: 22 U/L (ref 5–41)
ANION GAP SERPL CALCULATED.3IONS-SCNC: 16 MMOL/L (ref 7–19)
AST SERPL-CCNC: 17 U/L (ref 5–40)
BILIRUB SERPL-MCNC: 0.8 MG/DL (ref 0.2–1.2)
BUN BLDV-MCNC: 72 MG/DL (ref 8–23)
CALCIUM SERPL-MCNC: 8.9 MG/DL (ref 8.8–10.2)
CHLORIDE BLD-SCNC: 100 MMOL/L (ref 98–111)
CO2: 25 MMOL/L (ref 22–29)
CREAT SERPL-MCNC: 3.2 MG/DL (ref 0.5–1.2)
EKG P AXIS: NORMAL DEGREES
EKG P-R INTERVAL: NORMAL MS
EKG Q-T INTERVAL: 436 MS
EKG QRS DURATION: 154 MS
EKG QTC CALCULATION (BAZETT): 437 MS
EKG T AXIS: 177 DEGREES
GFR SERPL CREATININE-BSD FRML MDRD: 19 ML/MIN/{1.73_M2}
GLUCOSE BLD-MCNC: 121 MG/DL (ref 74–109)
GLUCOSE BLD-MCNC: 130 MG/DL (ref 70–99)
GLUCOSE BLD-MCNC: 161 MG/DL (ref 70–99)
MAGNESIUM: 2 MG/DL (ref 1.6–2.4)
PERFORMED ON: ABNORMAL
PERFORMED ON: ABNORMAL
POTASSIUM REFLEX MAGNESIUM: 3.5 MMOL/L (ref 3.5–5)
PRO-BNP: 5413 PG/ML (ref 0–1800)
SODIUM BLD-SCNC: 141 MMOL/L (ref 136–145)
TOTAL PROTEIN: 5.6 G/DL (ref 6.6–8.7)

## 2022-11-28 PROCEDURE — 6360000002 HC RX W HCPCS: Performed by: NURSE PRACTITIONER

## 2022-11-28 PROCEDURE — 2700000000 HC OXYGEN THERAPY PER DAY

## 2022-11-28 PROCEDURE — 93010 ELECTROCARDIOGRAM REPORT: CPT | Performed by: INTERNAL MEDICINE

## 2022-11-28 PROCEDURE — 94640 AIRWAY INHALATION TREATMENT: CPT

## 2022-11-28 PROCEDURE — 82947 ASSAY GLUCOSE BLOOD QUANT: CPT

## 2022-11-28 PROCEDURE — 94760 N-INVAS EAR/PLS OXIMETRY 1: CPT

## 2022-11-28 PROCEDURE — 36415 COLL VENOUS BLD VENIPUNCTURE: CPT

## 2022-11-28 PROCEDURE — 6370000000 HC RX 637 (ALT 250 FOR IP): Performed by: NURSE PRACTITIONER

## 2022-11-28 PROCEDURE — 83880 ASSAY OF NATRIURETIC PEPTIDE: CPT

## 2022-11-28 PROCEDURE — 94660 CPAP INITIATION&MGMT: CPT

## 2022-11-28 PROCEDURE — 83735 ASSAY OF MAGNESIUM: CPT

## 2022-11-28 PROCEDURE — 6370000000 HC RX 637 (ALT 250 FOR IP): Performed by: STUDENT IN AN ORGANIZED HEALTH CARE EDUCATION/TRAINING PROGRAM

## 2022-11-28 PROCEDURE — 80053 COMPREHEN METABOLIC PANEL: CPT

## 2022-11-28 PROCEDURE — 2580000003 HC RX 258: Performed by: NURSE PRACTITIONER

## 2022-11-28 RX ORDER — AZITHROMYCIN 500 MG/1
500 TABLET, FILM COATED ORAL EVERY 24 HOURS
Qty: 1 TABLET | Refills: 0 | Status: SHIPPED | OUTPATIENT
Start: 2022-11-29 | End: 2022-11-30

## 2022-11-28 RX ORDER — ASPIRIN 81 MG/1
81 TABLET ORAL DAILY
Qty: 30 TABLET | Refills: 3 | Status: SHIPPED | OUTPATIENT
Start: 2022-11-29

## 2022-11-28 RX ORDER — BUMETANIDE 1 MG/1
2 TABLET ORAL DAILY
Qty: 60 TABLET | Refills: 0 | Status: SHIPPED | OUTPATIENT
Start: 2022-11-28 | End: 2022-11-28 | Stop reason: SDUPTHER

## 2022-11-28 RX ORDER — BUMETANIDE 1 MG/1
2 TABLET ORAL DAILY
Status: DISCONTINUED | OUTPATIENT
Start: 2022-11-28 | End: 2022-11-28 | Stop reason: HOSPADM

## 2022-11-28 RX ORDER — METOLAZONE 2.5 MG/1
2.5 TABLET ORAL DAILY PRN
Qty: 30 TABLET | Refills: 0 | Status: SHIPPED | OUTPATIENT
Start: 2022-11-28 | End: 2022-12-28

## 2022-11-28 RX ORDER — CEFDINIR 300 MG/1
300 CAPSULE ORAL 2 TIMES DAILY
Qty: 6 CAPSULE | Refills: 0 | Status: SHIPPED | OUTPATIENT
Start: 2022-11-29 | End: 2022-12-02

## 2022-11-28 RX ORDER — BUMETANIDE 2 MG/1
2 TABLET ORAL DAILY
Qty: 30 TABLET | Refills: 0 | Status: SHIPPED | OUTPATIENT
Start: 2022-11-28 | End: 2022-12-28

## 2022-11-28 RX ADMIN — ASPIRIN 81 MG: 81 TABLET, COATED ORAL at 08:29

## 2022-11-28 RX ADMIN — PANTOPRAZOLE SODIUM 40 MG: 40 TABLET, DELAYED RELEASE ORAL at 08:30

## 2022-11-28 RX ADMIN — SODIUM CHLORIDE, PRESERVATIVE FREE 1000 MG: 5 INJECTION INTRAVENOUS at 08:30

## 2022-11-28 RX ADMIN — ENOXAPARIN SODIUM 30 MG: 100 INJECTION SUBCUTANEOUS at 08:29

## 2022-11-28 RX ADMIN — Medication 2000 UNITS: at 08:30

## 2022-11-28 RX ADMIN — FOLIC ACID 1 MG: 1 TABLET ORAL at 08:30

## 2022-11-28 RX ADMIN — AMIODARONE HYDROCHLORIDE 200 MG: 200 TABLET ORAL at 08:30

## 2022-11-28 RX ADMIN — IPRATROPIUM BROMIDE AND ALBUTEROL SULFATE 1 AMPULE: 2.5; .5 SOLUTION RESPIRATORY (INHALATION) at 06:18

## 2022-11-28 RX ADMIN — TAMSULOSIN HYDROCHLORIDE 0.8 MG: 0.4 CAPSULE ORAL at 08:30

## 2022-11-28 RX ADMIN — SODIUM CHLORIDE, PRESERVATIVE FREE 10 ML: 5 INJECTION INTRAVENOUS at 08:31

## 2022-11-28 RX ADMIN — IPRATROPIUM BROMIDE AND ALBUTEROL SULFATE 1 AMPULE: 2.5; .5 SOLUTION RESPIRATORY (INHALATION) at 10:55

## 2022-11-28 RX ADMIN — AZITHROMYCIN MONOHYDRATE 500 MG: 250 TABLET ORAL at 08:30

## 2022-11-28 RX ADMIN — NIFEDIPINE 60 MG: 60 TABLET, EXTENDED RELEASE ORAL at 08:30

## 2022-11-28 RX ADMIN — METOPROLOL SUCCINATE 100 MG: 50 TABLET, EXTENDED RELEASE ORAL at 08:30

## 2022-11-28 RX ADMIN — ATORVASTATIN CALCIUM 20 MG: 20 TABLET, FILM COATED ORAL at 08:30

## 2022-11-28 RX ADMIN — BUMETANIDE 2 MG: 1 TABLET ORAL at 08:29

## 2022-11-28 NOTE — CARE COORDINATION
SW met with pt at bedside. Pt states he is ready to dc and his spouse will transport him home. He denied any needs at this time. Pt states \"I have everything I need at home\"    11/28/22 1110   Service Assessment   Patient Orientation Alert and Oriented   Cognition Alert   History Provided By Patient   Primary Caregiver Self   Accompanied By/Relationship no one at bedside   Support Systems Spouse/Significant Other;Family Members   Patient's Healthcare Decision Maker is: Legal Next of Kin   PCP Verified by CM Yes   Last Visit to PCP Within last 3 months   Prior Functional Level Independent in ADLs/IADLs   Current Functional Level Independent in ADLs/IADLs   Can patient return to prior living arrangement Yes   Ability to make needs known: Good   Family able to assist with home care needs: Yes   Would you like for me to discuss the discharge plan with any other family members/significant others, and if so, who? Yes  (Spouse)   Financial Resources [employer]   (denied needs)   CM/SW Referral   (denied needs)   Social/Functional History   Lives With Significant other   Type of Home House   Bathroom Shower/Tub Tub/Shower unit   [address] [place] [address] Dr chair   [address] Oxygen  (only at night - [facility])   [address]   Transfer Assistance Independent   Mode of Transportation Car   Occupation Retired   Discharge Planning   Type of [address] Prior To Admission C-pap;Oxygen Therapy  (Only at night [employer])   Potential Assistance Needed   (denied any needs at this time)   DME Ordered?  No   Potential Assistance Purchasing Medications No   Type of Home Care Services None   Patient expects to be discharged to: [facility] Discharge   Mode of Transport at Discharge   (Spouse)   Confirm Follow Up Transport Family     Patient Contact Information:    [phone] (home)   Telephone Information:   Mobile [phone]     Above information verified? [x]   Yes  []   No      Emergency Contacts:    Extended Emergency Contact Information  Primary Emergency Contact: Aram Dent  Address: [address] [place] [address] Phone: [phone]  Mobile Phone: [phone]  Relation: Spouse  Secondary Emergency Contact: Hernando Mercado of [address] [place] [address] Phone: [phone]  Relation: Child      Have you been vaccinated for COVID-19 (SARS-CoV-2)? [x]   Yes  []   No                   If so, when?     Which :         [x]   Pfizer-BioNTSymphony  []   Moderna  []   Adeline Conde  []   Other:         Pharmacy:    [facility] #[id number] [facility], [phone] [phone] - F [phone]  [address] [address] [address]  Phone: [phone] Fax: [phone]    [address] [place] [address] Dr Barfield Alu [address] [facility] [phone]  [address] [place] [address] Dr Barfield Alu [address] [address]  Phone: [phone] Fax: [phone]          Patient Deficits:    []   Yes   [x]   No    If yes:    []   Confusion/Memory  []   Visual  []   Motor/Sensory         []   Right arm         []   Right leg         []   Left arm         []   Left leg  []   Language/Speech         []   Aphasia         []   Dysarthria         []   Swallow         Dago Coma Scale  Eye Opening: Spontaneous  Best Verbal Response: Oriented  Best Motor Response: Obeys commands  Dago Coma Scale Score: 15    Patient Deficit Notes:

## 2022-11-29 NOTE — DISCHARGE SUMMARY
[place], [place], [address]    DEPARTMENT OF HOSPITALIST MEDICINE      DISCHARGE SUMMARY:      PATIENT NAME:  Tyrel Russell  :  1940  MRN:  [id number]    Admission Date:   2022  6:00 AM Attending: No att. providers found   Discharge Date:   2022              PCP: Raul Cole MD  Length of Stay: 2 days     Chief Complaint on Admission:   Chief Complaint   Patient presents with    Shortness of Breath     Pt presents with shortness of breath for several days. Pt reports that he is out of his Lasix for 5-6 days. EMS reports 89% on Cpap at home. Pt presents on NRB. Fall     Left leg pain. Pt had fall coming down steps when EMS arrived. Pt denies LOC. Consultants:     None       CUMULATIVE  HOSPITAL  COURSE  AND  TREATMENT:  Tyrel Russell is an 81 y.o. male with  PMH of CHF, CKD, COPD on 2-3L home O2, GERD, PVD, Bladder cancer and T2Dm. He presneted to the ED acutely short of breath. Hes been out of his diuretic for 7 days. His legs have become acutely edematous, and he is SOA. He denies fever, or chest pain. He is on 2-3 liters O2 at baseline and wears cpap at night. He fell getting to the EMS personal.  He fell down his step and landed on his buttocks. Denies any injury, did not hit his head. Admitted to hospitalist service for acute on chronic diastolic CHF. CXR was also concerning for RLL opacity. Treated as pneumonia with Ceftriaxone and azithromycin given his cough and elevated WBC of 18.3. Prescribed Cefdinir and Azithromycin on discharge. Diuresed with Bumex 2 mg IV BID and Metolazone 2.5 mg daily. Dyspnea and BLE edema improved. Diuretics transitioned to home dose of Bumex 2 mg daily and Metolazone 2.5 mg daily PRN. He was discharged home with his 3L home O2. HE will follow up with cardiology and PCP as scheduled.     Discharge Problem List:   Principal Problem:    Acute on chronic diastolic congestive heart failure [facility])  Active Problems:    Community acquired pneumonia    Essential hypertension    CHF (congestive heart failure) (HCC)    Chronic respiratory failure with hypoxia and hypercapnia (HCC)    Chronic kidney disease  Resolved Problems:    * No resolved hospital problems. *         Last dated Assessment and Plan . .. 11/27/22  Acute on chronic diastolic congestive heart failure  - Echo (5/25/2022) showed EF 50 to 55%, mild MR, mild AS  --Did not take diuretic for 1 week  - Continue Bumex 2 mg IV twice daily, give metolazone 2.5 mg today, DC for tomorrow  -- Good UOP, net negative 1.3L yesterday  --Significant improvement in dyspnea and BLE edema  --Repeat BNP on day of discharge     CAP  -- CXR showed RLL opacity and interstitial edema, opacity could be from edema but will treat as CAP given his cough and elevated WBC of 18.3 on admission  -- Continue Ceftriaxone x5 days and Azithromycin x3 days  -- Transition to Cefdinir at discharge to complete 5 day course     CAD  -- Continue ASA, statin, Toprol Xl     CKD stage IV  -- Cr near baseline ~3  -- Is/Os  -- Avoid nephrotoxins     HTN  -- Nifedipine XL 60 mg daily     BPH with obstruction  -- Continue home Flomax       OBJECTIVE:  BP (!) 131/59   Pulse 60   Temp 97 °F (36.1 °C) (Temporal)   Resp 18   Ht 6' (1.829 m)   Wt 214 lb 3.2 oz (97.2 kg)   SpO2 97%   BMI 29.05 kg/m²       Heart: RRR   Lungs: Bilateral fair air entry   Abdomen: Soft, non-tender   Extremities: No edema   Neurologic: Alert and oriented   Skin: Warm and dry          Laboratory Data:  Recent Labs     11/26/22  0600 11/27/22 0228   WBC 18.3* 9.5   HGB 11.4* 9.5*    114*     Recent Labs     11/26/22  0600 11/26/22  0624 11/27/22 0228 11/28/22  0140     --  138 141   K 4.6 4.7 4.1 3.5     --  104 100   CO2 18*  --  22 25   BUN 57*  --  69* 72*   CREATININE 2.9*  --  3.3* 3.2*   GLUCOSE 208*  --  116* 121*     Recent Labs     11/26/22  0600 11/27/22 0228 11/28/22  0140   AST 18 15 17   ALT 24 22 22   BILITOT 1.3* 1.0 0.8   ALKPHOS 92 78 85     Troponin T:   Recent Labs     11/26/22  0600   TROPONINI 0.04*     Pro-BNP: No results for input(s): BNP in the last 72 hours. INR: No results for input(s): INR in the last 72 hours. UA:  Recent Labs     11/26/22  1346   COLORU YELLOW   PHUR 5.0   WBCUA 5   RBCUA 1   BACTERIA NEGATIVE*   CLARITYU Clear   SPECGRAV 1.009   LEUKOCYTESUR TRACE*   UROBILINOGEN 0.2   BILIRUBINUR Negative   BLOODU Negative   GLUCOSEU Negative     A1C: No results for input(s): LABA1C in the last 72 hours. ABG:  Recent Labs     11/26/22  0624   PHART 7.270*   REU3ZGS 45.0   PO2ART 63.0*   DDX9PWH 20.7*   BEART -6.1*   HGBAE 11.1*   M9ZNMEWM 90.8   CARBOXHGBART 2.0            Impressions of imaging performed in 48 hours before discharge:    XR CHEST PORTABLE    Result Date: 11/26/2022  Patient: Nadja Weinstein  Time Out: 07:42Exam(s): FILM CXR 1 VIEW EXAM:  XR Chest, 1 ViewCLINICAL HISTORY:   Reason for exam: shortness of breth. TECHNIQUE:  Frontal view of the chest.COMPARISON:  Single view of the chest May 24, 2022FINDINGS:  Lungs:  Right lower lung airspace disease which may be infectious in nature. Mild interstitial changes in the lungs which can be seen with mild interstitial pulmonary edema. Pleural space:  Unremarkable. No pneumothorax. Heart:  Unremarkable. No cardiomegaly. Mediastinum:  Unremarkable. Bones/joints:  Degenerative changes in the spine. Vasculature: Atherosclerotic calcifications. Tubes, lines and devices:  Left subclavian central venous catheter tip in the approach pacemaker with leads. 1.  Right lower lung airspace disease which may be infectious in nature. 2. Mild interstitial changes in the lungs which can be seen with mild interstitial pulmonary edema. Electronically signed by Nathan Tena MD on 11-26-22 at 3632          Medication List        START taking these medications      azithromycin 500 MG tablet  Commonly known as: ZITHROMAX  Take 1 tablet by mouth every 24 hours for 1 dose  Start taking on: November 29, 2022     cefdinir 300 MG capsule  Commonly known as: OMNICEF  Take 1 capsule by mouth 2 times daily for 3 days  Start taking on: November 29, 2022            CHANGE how you take these medications      metOLazone 2.5 MG tablet  Commonly known as: ZAROXOLYN  Take 1 tablet by mouth daily  What changed:   when to take this  reasons to take this            CONTINUE taking these medications      amiodarone 200 MG tablet  Commonly known as: CORDARONE  TAKE 1 TABLET BY MOUTH DAILY     aspirin 81 MG EC tablet  Take 1 tablet by mouth daily  Start taking on: November 29, 2022     atorvastatin 20 MG tablet  Commonly known as: LIPITOR  Take 1 tablet by mouth daily     bumetanide 1 MG tablet  Commonly known as: BUMEX  Take 2 tablets by mouth daily     folic acid 1 MG tablet  Commonly known as: FOLVITE  Take 1 tablet by mouth daily     glipiZIDE 5 MG extended release tablet  Commonly known as: GLUCOTROL XL     metoprolol succinate 100 MG extended release tablet  Commonly known as: TOPROL XL  Take 1 tablet by mouth daily     mirabegron 25 MG Tb24  Commonly known as: MYRBETRIQ  Take 1 tablet by mouth daily     NIFEdipine 60 MG extended release tablet  Commonly known as: ADALAT CC  Take 1 tablet by mouth daily     OXYGEN  Inhale 2 L into the lungs continuous     Senna Plus 8.6-50 MG per tablet  Generic drug: senna-docusate     tamsulosin 0.4 MG capsule  Commonly known as: FLOMAX  Take 2 capsules by mouth daily     Trulance 3 MG Tabs  Generic drug: Plecanatide  TAKE 1 TABLET BY MOUTH DAILY            ASK your doctor about these medications      ferrous sulfate 325 (65 Fe) MG tablet  Commonly known as: IRON 325  Take 1 tablet by mouth 2 times daily (with meals)     pantoprazole 20 MG tablet  Commonly known as: PROTONIX     vitamin D 1.25 MG (99116 UT) Caps capsule  Commonly known as: ERGOCALCIFEROL  Take 1 capsule by mouth once a week               Where to Get Your Medications These medications were sent to [place] [id number] [id number] [facility], [phone] [facility] Kasandra Diaz [phone] [address], [address] [place] [address]      Phone: [phone]   aspirin 81 MG EC tablet  azithromycin 500 MG tablet  cefdinir 300 MG capsule           ISSUES TO BE ADDRESSED AT HOSPITAL FOLLOW-UP VISIT:    Follow up with cardiology and PCP as scheduled for HFpEF. Continued home diuretics at discharge with new prescription. Follow up with pulmonology as scheduled for COPD. Advised patient to follow-up closely with PCP upon discharge for management of chronic medical issues    Condition on Discharge: stable  Discharge Disposition: Home    Recommended Follow Up:  Wild Guerrero MD  [address] [facility]  [phone]    Follow up on 12/6/2022  Hospital follow-up appointment is scheduled for 12/6 at 1:30pm.    Followup Appointments Scheduled at Time of Discharge:  Future Appointments   Date Time Provider Hernando Whalen   1/9/2023  1:15 PM MD JAYLEN B[facility] [place]   1/17/2023  1:15 PM MD Gilda Gonzalez [facility] [place]   5/22/2023  1:00 PM Belem Cueva MD N [facility] [place]        Discharge Instructions:   Please see the discharge paperwork. Patient was seen at bedside today, and the examination shows improvement since yesterday. Diet Instructions:  No diet orders on file     Detailed discharge directions delivered to the patient by myself and our nursing staff, who verbalizes understanding and is satisfied with the plan. Patient has been advised to continue all medications as prescribed and advised, and f/u with PCP within 1 week. Patient is stable from medical standpoint to be discharged.     Total time spent during patient evaluation and assessment, discussion with the nurse/family, addressing discharge medications/scripts and coordination of care for safe discharge was in excess of 35 minutes.       Signed Electronically:    Mitzi Mello MD  11:12 PM 11/28/2022

## 2022-12-01 LAB
BLOOD CULTURE, ROUTINE: NORMAL
CULTURE, BLOOD 2: NORMAL

## 2022-12-06 NOTE — PROGRESS NOTES
Evaluation:   · Evaluation: Progressing toward goals   · Goals: meet nutritional needs through po intake or alternate source of nutrition    · Monitoring: Meal Intake, Diet Tolerance, Skin Integrity, Wound Healing, Weight, Pertinent Labs    See Adult Nutrition Doc Flowsheet for more detail.      Electronically signed by Ethan Russell MS, RD, LD on 10/30/18 at 1:34 PM    Contact Number: [phone] patient preference

## 2023-01-17 ENCOUNTER — OFFICE VISIT (OUTPATIENT)
Dept: PULMONOLOGY | Age: 83
End: 2023-01-17
Payer: MEDICARE

## 2023-01-17 VITALS
OXYGEN SATURATION: 98 % | HEIGHT: 72 IN | TEMPERATURE: 97.5 F | WEIGHT: 215 LBS | BODY MASS INDEX: 29.12 KG/M2 | HEART RATE: 60 BPM | SYSTOLIC BLOOD PRESSURE: 128 MMHG | DIASTOLIC BLOOD PRESSURE: 70 MMHG

## 2023-01-17 DIAGNOSIS — J96.12 CHRONIC RESPIRATORY FAILURE WITH HYPOXIA AND HYPERCAPNIA (HCC): Primary | ICD-10-CM

## 2023-01-17 DIAGNOSIS — J44.1 CHRONIC OBSTRUCTIVE PULMONARY DISEASE WITH (ACUTE) EXACERBATION (HCC): ICD-10-CM

## 2023-01-17 DIAGNOSIS — G47.33 OSA TREATED WITH BIPAP: ICD-10-CM

## 2023-01-17 DIAGNOSIS — I50.32 CHRONIC DIASTOLIC CONGESTIVE HEART FAILURE (HCC): ICD-10-CM

## 2023-01-17 DIAGNOSIS — J96.11 CHRONIC RESPIRATORY FAILURE WITH HYPOXIA AND HYPERCAPNIA (HCC): Primary | ICD-10-CM

## 2023-01-17 PROCEDURE — 3023F SPIROM DOC REV: CPT | Performed by: INTERNAL MEDICINE

## 2023-01-17 PROCEDURE — 1036F TOBACCO NON-USER: CPT | Performed by: INTERNAL MEDICINE

## 2023-01-17 PROCEDURE — G8484 FLU IMMUNIZE NO ADMIN: HCPCS | Performed by: INTERNAL MEDICINE

## 2023-01-17 PROCEDURE — G8427 DOCREV CUR MEDS BY ELIG CLIN: HCPCS | Performed by: INTERNAL MEDICINE

## 2023-01-17 PROCEDURE — 3074F SYST BP LT 130 MM HG: CPT | Performed by: INTERNAL MEDICINE

## 2023-01-17 PROCEDURE — 99214 OFFICE O/P EST MOD 30 MIN: CPT | Performed by: INTERNAL MEDICINE

## 2023-01-17 PROCEDURE — 1123F ACP DISCUSS/DSCN MKR DOCD: CPT | Performed by: INTERNAL MEDICINE

## 2023-01-17 PROCEDURE — 3078F DIAST BP <80 MM HG: CPT | Performed by: INTERNAL MEDICINE

## 2023-01-17 PROCEDURE — G8417 CALC BMI ABV UP PARAM F/U: HCPCS | Performed by: INTERNAL MEDICINE

## 2023-01-17 ASSESSMENT — ENCOUNTER SYMPTOMS
ABDOMINAL PAIN: 0
SHORTNESS OF BREATH: 1
COUGH: 0
ANAL BLEEDING: 0
BACK PAIN: 0
RHINORRHEA: 0
APNEA: 0
CHEST TIGHTNESS: 0
WHEEZING: 0
ABDOMINAL DISTENTION: 0

## 2023-01-17 NOTE — PROGRESS NOTES
Pulmonary and Sleep Medicine    Cheli Florence (:  1940) is a 81 y.o. male,Established patient, here for evaluation of the following chief complaint(s):  Follow-up (Follow up- GREGORIO )      Referring physician:  No referring provider defined for this encounter. ASSESSMENT/PLAN:  1. Chronic respiratory failure with hypoxia and hypercapnia ([address] on oxygen and bipap  2. Chronic obstructive pulmonary disease with (acute) exacerbation (HCC)  3. Chronic diastolic congestive heart failure ([address]  4. GREGORIO treated with BiPAP      Continue current management with noninvasive ventilation during sleep. Continue diuresis. Continue oxygen supplementation as necessary to maintain adequate oxygenation. Pauline Weinstein MD, [facility], [facility]    Return in about 6 months (around 2023). SUBJECTIVE/OBJECTIVE:  He is here for follow up on chronic resp failure and GREGORIO. He is using the BIPAP and is compliant with the BiPAP. He feels it is helping him. Was admitted for volume overload around  after he ran out of his diuretics. Prior to Visit Medications    Medication Sig Taking?  Authorizing Provider   bumetanide (BUMEX) 2 MG tablet Take 1 tablet by mouth daily Yes Tigist Carter MD   metOLazone (ZAROXOLYN) 2.5 MG tablet Take 1 tablet by mouth daily as needed (5 lb weight gain or worsening lower extremity edema) Yes Tigist Carter MD   amiodarone (CORDARONE) 200 MG tablet TAKE 1 TABLET BY MOUTH DAILY Yes ELISABETH Lemus   glipiZIDE (GLUCOTROL XL) 5 MG extended release tablet Take 1.25 mg by mouth daily Takes a half a day Yes Historical Provider, MD   mirabegron (MYRBETRIQ) 25 MG TB24 Take 1 tablet by mouth daily Yes ELISABETH Lennon - CNP   TRULANCE 3 MG TABS TAKE 1 TABLET BY MOUTH DAILY Yes ELISABETH Stinson - NP   tamsulosin (FLOMAX) 0.4 MG capsule Take 2 capsules by mouth daily Yes Megan Salcedo MD   SENNA PLUS 8.6-50 MG per tablet  Yes Historical Provider, MD atorvastatin (LIPITOR) 20 MG tablet Take 1 tablet by mouth daily Yes Mely Snowden MD   metoprolol succinate (TOPROL XL) 100 MG extended release tablet Take 1 tablet by mouth daily Yes Mely Snowden MD   NIFEdipine (ADALAT CC) 60 MG extended release tablet Take 1 tablet by mouth daily Yes Mely Snowden MD   folic acid (FOLVITE) 1 MG tablet Take 1 tablet by mouth daily Yes Mely Snowden MD   vitamin D (ERGOCALCIFEROL) 1.25 MG (12603 UT) CAPS capsule Take 1 capsule by mouth once a week  Patient taking differently: Take 50,000 Units by mouth once a week Mondays Yes Mely Snowden MD   aspirin 81 MG EC tablet Take 1 tablet by mouth daily  Cecil Boxer, MD   pantoprazole (PROTONIX) 20 MG tablet Take 40 mg by mouth daily   Patient not taking: No sig reported  Historical Provider, MD   ferrous sulfate (IRON 325) 325 (65 Fe) MG tablet Take 1 tablet by mouth 2 times daily (with meals)  Patient not taking: No sig reported  Mely Snowden MD   OXYGEN Inhale 2 L into the lungs continuous  Mely Snowden MD        Review of Systems   Constitutional:  Negative for activity change, appetite change, chills, diaphoresis and fatigue. HENT:  Negative for congestion, dental problem, drooling, ear discharge, postnasal drip and rhinorrhea. Eyes:  Negative for visual disturbance. Respiratory:  Positive for shortness of breath. Negative for apnea, cough, chest tightness and wheezing. Gastrointestinal:  Negative for abdominal distention, abdominal pain and anal bleeding. Endocrine: Negative for cold intolerance, heat intolerance and polydipsia. Genitourinary:  Negative for difficulty urinating, dysuria, enuresis and flank pain. Musculoskeletal:  Negative for arthralgias, back pain and gait problem. Allergic/Immunologic: Negative for environmental allergies. Neurological:  Negative for dizziness, facial asymmetry, light-headedness and headaches.      Vitals:    01/17/23 1317   BP: 128/70   Pulse: 60   Temp: 97.5 °F (36.4 °C)   SpO2: 98%     BMI Readings from Last 1 Encounters:   01/17/23 29.16 kg/m²         Physical Exam  Vitals reviewed. Constitutional:       Appearance: Normal appearance. HENT:      Head: Normocephalic and atraumatic. Nose: Nose normal.   Eyes:      Extraocular Movements: Extraocular movements intact. Conjunctiva/sclera: Conjunctivae normal.   Cardiovascular:      Rate and Rhythm: Normal rate and regular rhythm. Heart sounds: No murmur heard. No friction rub. Pulmonary:      Effort: Pulmonary effort is normal. No respiratory distress. Breath sounds: Normal breath sounds. No stridor. No wheezing, rhonchi or rales. Abdominal:      General: There is no distension. Palpations: There is no mass. Tenderness: There is no abdominal tenderness. There is no guarding or rebound. Musculoskeletal:      Cervical back: Normal range of motion and neck supple. Neurological:      Mental Status: He is alert and oriented to person, place, and time. This note was generated using a voice recognition software. Errors in voice recognition may have occurred. An electronic signature was used to authenticate this note.     --Ferny Mayorga MD

## 2023-02-13 ENCOUNTER — PROCEDURE VISIT (OUTPATIENT)
Dept: UROLOGY | Age: 83
End: 2023-02-13
Payer: MEDICARE

## 2023-02-13 VITALS — HEIGHT: 72 IN | WEIGHT: 226 LBS | BODY MASS INDEX: 30.61 KG/M2 | TEMPERATURE: 98 F

## 2023-02-13 DIAGNOSIS — Z85.51 HISTORY OF BLADDER CANCER: ICD-10-CM

## 2023-02-13 DIAGNOSIS — N39.41 URGE INCONTINENCE: ICD-10-CM

## 2023-02-13 DIAGNOSIS — N40.1 BENIGN PROSTATIC HYPERPLASIA WITH URINARY FREQUENCY: ICD-10-CM

## 2023-02-13 DIAGNOSIS — R35.0 BENIGN PROSTATIC HYPERPLASIA WITH URINARY FREQUENCY: ICD-10-CM

## 2023-02-13 LAB
BILIRUBIN, POC: NORMAL
BLOOD URINE, POC: NORMAL
CLARITY, POC: CLEAR
COLOR, POC: YELLOW
GLUCOSE URINE, POC: NORMAL
KETONES, POC: NORMAL
LEUKOCYTE EST, POC: NORMAL
NITRITE, POC: NORMAL
PH, POC: 5
PROTEIN, POC: 30
SPECIFIC GRAVITY, POC: 1.02
UROBILINOGEN, POC: 0.2

## 2023-02-13 PROCEDURE — 52000 CYSTOURETHROSCOPY: CPT | Performed by: UROLOGY

## 2023-02-13 PROCEDURE — 81003 URINALYSIS AUTO W/O SCOPE: CPT | Performed by: UROLOGY

## 2023-02-13 RX ORDER — NIFEDIPINE 60 MG/1
TABLET, EXTENDED RELEASE ORAL
COMMUNITY
Start: 2023-02-11 | End: 2023-02-13 | Stop reason: CLARIF

## 2023-02-13 NOTE — PROGRESS NOTES
BLADDER CANCER  Patient was first diagnosed with bladder cancer approximately 4 years(s) ago. 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 None  Last upper tract study:  2.5  year(s) ago. Study was a CT done 7/31/2020 had a renal ultrasound done 8/26/2021 showed a right renal cyst.    Patient has overactive bladder with urge incontinence. He is on tamsulosin and Myrbetriq. He says he is wearing a pad continuously now. When he was in the hospital he had condom catheter he request some samples or prescription for this. He does take a diuretic    Cystoscopy Operative Note (2/13/23)  Surgeon: Veronica Butts MD  Anesthesia: Urethral 2% Xylocaine   Indications: History of bladder cancer  Position: Supine    Findings:   The patient was prepped and draped in the usual sterile fashion. The flexible cystoscope was advanced through the urethra and into the bladder under direct vision. The bladder was thoroughly inspected and the following was noted:    Residual Urine: mild  Urethra: normal appearing urethra with no masses, tenderness or lesions  Prostate: No significant obstructing lateral lobes of prostate fairly wide open channel without any obstruction; median lobe present? no.    Bladder: No tumors or CIS noted. No bladder diverticulum. There was mild trabeculation noted. No recurrent papillary tumor seen he had a bladder contraction with urge incontinence follow scoping him  Ureters: Clear efflux from both ureters. Orifices with normal configuration and location. The cystoscope was removed. The patient tolerated the procedure well. The patient was given a post procedure instructions and follow up.     Results for orders placed or performed in visit on 02/13/23   POCT Urinalysis No Micro (Auto)   Result Value Ref Range    Color, UA yellow     Clarity, UA clear     Glucose, UA POC -     Bilirubin, UA -     Ketones, UA trace     Spec Grav, UA 1.020     Blood, UA POC -     pH, UA 5.0     Protein, UA POC 30     Urobilinogen, UA 0.2     Leukocytes, UA -     Nitrite, UA -            1. History of bladder cancer  Cystoscopy today shows no evidence of bladder cancer recurrence. He has not had a recurrence his initial diagnosis approximately 4 years ago therefore I think we will extend his interval to once a year for surveillance cystoscopy. - Cystoscopy  - POCT Urinalysis No Micro (Auto)    2. Benign prostatic hyperplasia with urinary frequency  He really does not have significant obstruction based on cystoscopy. We will discontinue tamsulosin and see if this helps reduce some of the incontinence. 3. Urge incontinence  He is on Myrbetriq. Because of CKD we can increase the dose. He requests prescription for condom catheter which we will oblige. We could try neuromodulation with PTNS. I will have him follow-up to see the nurse practitioner for trial.      Orders Placed This Encounter   Procedures    POCT Urinalysis No Micro (Auto)    [place] CYSTOURETHROSCOPY     Cystoscopy in 1 year     Standing Status:   Future     Standing Expiration Date:   2/12/2025       Return in 1 year (on 2/13/2024) for Cystoscopy on next visit.   Follow-up to see APRN for urgent PC Natalia Lin MD

## 2023-02-20 ENCOUNTER — NURSE ONLY (OUTPATIENT)
Dept: UROLOGY | Age: 83
End: 2023-02-20
Payer: MEDICARE

## 2023-02-20 DIAGNOSIS — R35.0 URINARY FREQUENCY: Primary | ICD-10-CM

## 2023-02-20 DIAGNOSIS — N39.41 URGE INCONTINENCE: ICD-10-CM

## 2023-02-20 PROCEDURE — 64566 NEUROELTRD STIM POST TIBIAL: CPT | Performed by: NURSE PRACTITIONER

## 2023-02-20 RX ORDER — TAMSULOSIN HYDROCHLORIDE 0.4 MG/1
0.4 CAPSULE ORAL DAILY
Qty: 90 CAPSULE | Refills: 3 | Status: SHIPPED | OUTPATIENT
Start: 2023-02-20

## 2023-02-20 NOTE — PROGRESS NOTES
Treatment # 1/10    Improvement of Symptoms? N/a    OAB/Urologic Medications? Myrbetriq 25mg       Uroplasty Procedure:    1. Patient is seated with the left treatment leg elevated. 2. 34 gauge fine needle electrode is inserted into lower inner aspect of the leg. Slightly cephalad to the medial malleolus. 3. Surface electrode pad is placed over the medial aspect of the calcaneus on the same leg. 4.Needle electrode is connected to the external pulse generator. 5.The pulse generator is turned on and the millivolts used are 19. 6.Patient is treated for 30 minutes. 7.The needle and pad are removed. Patient will follow up in 1 week for next treatment. Patient discontinued tamsulosin in hopes to decrease incontinence. He reports that this actually worsened his incontinence therefore we will go ahead and represcribe this.

## 2023-02-21 DIAGNOSIS — I49.5 SA NODE DYSFUNCTION (HCC): ICD-10-CM

## 2023-02-21 DIAGNOSIS — Z95.0 PACEMAKER: Primary | ICD-10-CM

## 2023-02-21 PROCEDURE — 93294 REM INTERROG EVL PM/LDLS PM: CPT | Performed by: NURSE PRACTITIONER

## 2023-02-21 PROCEDURE — 93296 REM INTERROG EVL PM/IDS: CPT | Performed by: NURSE PRACTITIONER

## 2023-02-28 ENCOUNTER — NURSE ONLY (OUTPATIENT)
Dept: UROLOGY | Age: 83
End: 2023-02-28
Payer: MEDICARE

## 2023-02-28 DIAGNOSIS — N39.41 URGE INCONTINENCE: Primary | ICD-10-CM

## 2023-02-28 PROCEDURE — 64566 NEUROELTRD STIM POST TIBIAL: CPT | Performed by: NURSE PRACTITIONER

## 2023-02-28 NOTE — PROGRESS NOTES
Treatment # 2/10    Improvement of Symptoms? no    OAB/Urologic Medications? Myrbetriq 25mg       Uroplasty Procedure:    1. Patient is seated with the left treatment leg elevated.  2. 34 gauge fine needle electrode is inserted into lower inner aspect of the leg. Slightly cephalad to the medial malleolus.  3. Surface electrode pad is placed over the medial aspect of the calcaneus on the same leg.  4.Needle electrode is connected to the external pulse generator.  5.The pulse generator is turned on and the millivolts used are 7.  6.Patient is treated for 30 minutes.  7.The needle and pad are removed.    Patient will follow up in 1 week for next treatment.

## 2023-03-07 ENCOUNTER — NURSE ONLY (OUTPATIENT)
Dept: UROLOGY | Age: 83
End: 2023-03-07
Payer: MEDICARE

## 2023-03-07 DIAGNOSIS — R35.0 URINARY FREQUENCY: ICD-10-CM

## 2023-03-07 DIAGNOSIS — N39.41 URGE INCONTINENCE: Primary | ICD-10-CM

## 2023-03-07 PROCEDURE — 64566 NEUROELTRD STIM POST TIBIAL: CPT | Performed by: NURSE PRACTITIONER

## 2023-03-07 NOTE — PROGRESS NOTES
Treatment # 3/10    Improvement of Symptoms? mild    OAB/Urologic Medications? Myrbetriq 25mg      Uroplasty Procedure:    1. Patient is seated with the left treatment leg elevated. 2. 34 gauge fine needle electrode is inserted into lower inner aspect of the leg. Slightly cephalad to the medial malleolus. 3. Surface electrode pad is placed over the medial aspect of the calcaneus on the same leg. 4.Needle electrode is connected to the external pulse generator. 5.The pulse generator is turned on and the millivolts used are 19. 6.Patient is treated for 30 minutes. 7.The needle and pad are removed. Patient will follow up in 1 week  for next treatment.

## 2023-03-14 ENCOUNTER — NURSE ONLY (OUTPATIENT)
Dept: UROLOGY | Age: 83
End: 2023-03-14
Payer: MEDICARE

## 2023-03-14 DIAGNOSIS — N39.41 URGE INCONTINENCE: Primary | ICD-10-CM

## 2023-03-14 DIAGNOSIS — R35.0 URINARY FREQUENCY: ICD-10-CM

## 2023-03-14 PROCEDURE — 64566 NEUROELTRD STIM POST TIBIAL: CPT | Performed by: NURSE PRACTITIONER

## 2023-03-14 NOTE — PROGRESS NOTES
Treatment # 4/12    Improvement of Symptoms? no    OAB/Urologic Medications? Myrbetriq      Uroplasty Procedure:    1. Patient is seated with the left treatment leg elevated.  2. 34 gauge fine needle electrode is inserted into lower inner aspect of the leg. Slightly cephalad to the medial malleolus.  3. Surface electrode pad is placed over the medial aspect of the calcaneus on the same leg.  4.Needle electrode is connected to the external pulse generator.  5.The pulse generator is turned on and the millivolts used are 19.  6.Patient is treated for 30 minutes.  7.The needle and pad are removed.    Patient will follow up in 1 week for next treatment.

## 2023-03-21 ENCOUNTER — NURSE ONLY (OUTPATIENT)
Dept: UROLOGY | Age: 83
End: 2023-03-21
Payer: MEDICARE

## 2023-03-21 DIAGNOSIS — R35.0 URINARY FREQUENCY: ICD-10-CM

## 2023-03-21 DIAGNOSIS — N39.41 URGE INCONTINENCE: Primary | ICD-10-CM

## 2023-03-21 PROCEDURE — 64566 NEUROELTRD STIM POST TIBIAL: CPT | Performed by: NURSE PRACTITIONER

## 2023-03-21 NOTE — PROGRESS NOTES
Treatment # 5/12    Improvement of Symptoms? Mild (had 2 good dayS)    OAB/Urologic Medications? Myrbetriq 25mg      Uroplasty Procedure:    1. Patient is seated with the left treatment leg elevated. 2. 34 gauge fine needle electrode is inserted into lower inner aspect of the leg. Slightly cephalad to the medial malleolus. 3. Surface electrode pad is placed over the medial aspect of the calcaneus on the same leg. 4.Needle electrode is connected to the external pulse generator. 5.The pulse generator is turned on and the millivolts used are 19. 6.Patient is treated for 30 minutes. 7.The needle and pad are removed. Patient will follow up in 1 week for next treatment.

## 2023-03-29 ENCOUNTER — NURSE ONLY (OUTPATIENT)
Dept: UROLOGY | Age: 83
End: 2023-03-29
Payer: MEDICARE

## 2023-03-29 DIAGNOSIS — N39.41 URGE INCONTINENCE: Primary | ICD-10-CM

## 2023-03-29 DIAGNOSIS — R35.0 URINARY FREQUENCY: ICD-10-CM

## 2023-03-29 PROCEDURE — 64566 NEUROELTRD STIM POST TIBIAL: CPT | Performed by: NURSE PRACTITIONER

## 2023-03-29 NOTE — PROGRESS NOTES
Treatment # 6/12    Improvement of Symptoms? Mild yes    OAB/Urologic Medications? Myrbetriq 25mg       Uroplasty Procedure:    1. Patient is seated with the left treatment leg elevated. 2. 34 gauge fine needle electrode is inserted into lower inner aspect of the leg. Slightly cephalad to the medial malleolus. 3. Surface electrode pad is placed over the medial aspect of the calcaneus on the same leg. 4.Needle electrode is connected to the external pulse generator. 5.The pulse generator is turned on and the millivolts used are 19. 6.Patient is treated for 30 minutes. 7.The needle and pad are removed. Patient will follow up in 1 week for next treatment.

## 2023-04-05 ENCOUNTER — NURSE ONLY (OUTPATIENT)
Dept: UROLOGY | Age: 83
End: 2023-04-05
Payer: MEDICARE

## 2023-04-05 VITALS — WEIGHT: 226 LBS | HEIGHT: 72 IN | TEMPERATURE: 98.3 F | BODY MASS INDEX: 30.61 KG/M2

## 2023-04-05 DIAGNOSIS — N39.41 URGE INCONTINENCE: Primary | ICD-10-CM

## 2023-04-05 DIAGNOSIS — R35.0 URINARY FREQUENCY: ICD-10-CM

## 2023-04-05 PROCEDURE — 64566 NEUROELTRD STIM POST TIBIAL: CPT | Performed by: NURSE PRACTITIONER

## 2023-04-05 NOTE — PROGRESS NOTES
Treatment # 7/12    Improvement of Symptoms? Mild    OAB/Urologic Medications? Myrbetriq 25mg      Uroplasty Procedure:    1. Patient is seated with the left treatment leg elevated. 2. 34 gauge fine needle electrode is inserted into lower inner aspect of the leg. Slightly cephalad to the medial malleolus. 3. Surface electrode pad is placed over the medial aspect of the calcaneus on the same leg. 4.Needle electrode is connected to the external pulse generator. 5.The pulse generator is turned on and the millivolts used are 19. 6.Patient is treated for 30 minutes. 7.The needle and pad are removed. Patient will follow up in 1 week for next treatment.

## 2023-04-18 RX ORDER — AMIODARONE HYDROCHLORIDE 200 MG/1
200 TABLET ORAL DAILY
Qty: 30 TABLET | Refills: 5 | Status: SHIPPED | OUTPATIENT
Start: 2023-04-18

## 2023-04-19 ENCOUNTER — OFFICE VISIT (OUTPATIENT)
Dept: UROLOGY | Age: 83
End: 2023-04-19
Payer: MEDICARE

## 2023-04-19 DIAGNOSIS — R35.0 URINARY FREQUENCY: ICD-10-CM

## 2023-04-19 DIAGNOSIS — N39.41 URGE INCONTINENCE: Primary | ICD-10-CM

## 2023-04-19 PROCEDURE — 64566 NEUROELTRD STIM POST TIBIAL: CPT | Performed by: NURSE PRACTITIONER

## 2023-04-19 NOTE — PROGRESS NOTES
Treatment # 9/12     Improvement of Symptoms? Moderate. Still having urgency/incontinence. 3/7 days    OAB/Urologic Medications? Myrbetriq 25mg       Uroplasty Procedure:    1. Patient is seated with the left treatment leg elevated. 2. 34 gauge fine needle electrode is inserted into lower inner aspect of the leg. Slightly cephalad to the medial malleolus. 3. Surface electrode pad is placed over the medial aspect of the calcaneus on the same leg. 4.Needle electrode is connected to the external pulse generator. 5.The pulse generator is turned on and the millivolts used are 19. 6.Patient is treated for 30 minutes. 7.The needle and pad are removed. Patient will follow up in 1 wk for next treatment.

## 2023-04-25 ENCOUNTER — NURSE ONLY (OUTPATIENT)
Dept: UROLOGY | Age: 83
End: 2023-04-25
Payer: MEDICARE

## 2023-04-25 DIAGNOSIS — N39.41 URGE INCONTINENCE: Primary | ICD-10-CM

## 2023-04-25 DIAGNOSIS — R35.0 URINARY FREQUENCY: ICD-10-CM

## 2023-04-25 PROCEDURE — 64566 NEUROELTRD STIM POST TIBIAL: CPT | Performed by: NURSE PRACTITIONER

## 2023-04-25 NOTE — PROGRESS NOTES
Treatment # 10/12    Improvement of Symptoms? Mild to moderate improvement     OAB/Urologic Medications? Myrbetriq 25mg       Uroplasty Procedure:    1. Patient is seated with the left treatment leg elevated. 2. 34 gauge fine needle electrode is inserted into lower inner aspect of the leg. Slightly cephalad to the medial malleolus. 3. Surface electrode pad is placed over the medial aspect of the calcaneus on the same leg. 4.Needle electrode is connected to the external pulse generator. 5.The pulse generator is turned on and the millivolts used are 19. 6.Patient is treated for 30 minutes. 7.The needle and pad are removed. Patient will follow up in 1 wk for next treatment.

## 2023-05-02 ENCOUNTER — NURSE ONLY (OUTPATIENT)
Dept: UROLOGY | Age: 83
End: 2023-05-02
Payer: MEDICARE

## 2023-05-02 DIAGNOSIS — R35.0 URINARY FREQUENCY: ICD-10-CM

## 2023-05-02 DIAGNOSIS — N39.41 URGE INCONTINENCE: Primary | ICD-10-CM

## 2023-05-02 PROCEDURE — 64566 NEUROELTRD STIM POST TIBIAL: CPT | Performed by: NURSE PRACTITIONER

## 2023-05-02 NOTE — PROGRESS NOTES
Treatment # 11/12    Improvement of Symptoms? Mild to moderate 3/7 days improved    OAB/Urologic Medications? Myrbetriq 25mg       Uroplasty Procedure:    1. Patient is seated with the left treatment leg elevated. 2. 34 gauge fine needle electrode is inserted into lower inner aspect of the leg. Slightly cephalad to the medial malleolus. 3. Surface electrode pad is placed over the medial aspect of the calcaneus on the same leg. 4.Needle electrode is connected to the external pulse generator. 5.The pulse generator is turned on and the millivolts used are 19. 6.Patient is treated for 30 minutes. 7.The needle and pad are removed. Patient will follow up in 1 week for next treatment. Has been on Myrbetriq for quite some time. He reports he was off this for probably 13 days at 1 point and felt no change in his symptoms. He feels that this is not improving his symptoms. Unable to increase his Myrbetriq due to renal function. At this time we will go ahead and discontinue Myrbetriq and started on Gemtesa 75 mg daily. We will provide him with 8 weeks of samples to see if there is any change in his symptoms.

## 2023-05-10 ENCOUNTER — NURSE ONLY (OUTPATIENT)
Dept: UROLOGY | Age: 83
End: 2023-05-10
Payer: MEDICARE

## 2023-05-10 DIAGNOSIS — R35.0 URINARY FREQUENCY: ICD-10-CM

## 2023-05-10 DIAGNOSIS — N39.41 URGE INCONTINENCE: Primary | ICD-10-CM

## 2023-05-10 PROCEDURE — 64566 NEUROELTRD STIM POST TIBIAL: CPT | Performed by: NURSE PRACTITIONER

## 2023-05-10 NOTE — PROGRESS NOTES
Treatment # 12/12    Improvement of Symptoms? Yes has even improved more over the last wk. Significant improvement     OAB/Urologic Medications? I discontinued Myrbetriq 25mg last wk and started him on Gemtesa 75mg daily       Uroplasty Procedure:    1. Patient is seated with the left treatment leg elevated. 2. 34 gauge fine needle electrode is inserted into lower inner aspect of the leg. Slightly cephalad to the medial malleolus. 3. Surface electrode pad is placed over the medial aspect of the calcaneus on the same leg. 4.Needle electrode is connected to the external pulse generator. 5.The pulse generator is turned on and the millivolts used are 19. 6.Patient is treated for 30 minutes. 7.The needle and pad are removed. Patient will follow up in 1 week for next treatment.

## 2023-05-17 ENCOUNTER — NURSE ONLY (OUTPATIENT)
Dept: UROLOGY | Age: 83
End: 2023-05-17
Payer: MEDICARE

## 2023-05-17 DIAGNOSIS — N39.41 URGE INCONTINENCE: Primary | ICD-10-CM

## 2023-05-17 DIAGNOSIS — R35.0 URINARY FREQUENCY: ICD-10-CM

## 2023-05-17 PROCEDURE — 64566 NEUROELTRD STIM POST TIBIAL: CPT | Performed by: NURSE PRACTITIONER

## 2023-05-17 NOTE — PROGRESS NOTES
Treatment # 13/12     Improvement of Symptoms? Mild-mod. 3/7 good days    OAB/Urologic Medications? Previously maintained on Myrbetriq 25mg- he felt no change with medication. I initiated Gemtesa 2 wks ago      Uroplasty Procedure:    1. Patient is seated with the left treatment leg elevated. 2. 34 gauge fine needle electrode is inserted into lower inner aspect of the leg. Slightly cephalad to the medial malleolus. 3. Surface electrode pad is placed over the medial aspect of the calcaneus on the same leg. 4.Needle electrode is connected to the external pulse generator. 5.The pulse generator is turned on and the millivolts used are 19. 6.Patient is treated for 30 minutes. 7.The needle and pad are removed. Patient will follow up in 2weeks for next treatment.

## 2023-05-25 ENCOUNTER — OFFICE VISIT (OUTPATIENT)
Dept: CARDIOLOGY CLINIC | Age: 83
End: 2023-05-25

## 2023-05-25 VITALS
HEIGHT: 72 IN | DIASTOLIC BLOOD PRESSURE: 72 MMHG | HEART RATE: 61 BPM | SYSTOLIC BLOOD PRESSURE: 118 MMHG | BODY MASS INDEX: 30.48 KG/M2 | WEIGHT: 225 LBS

## 2023-05-25 DIAGNOSIS — I49.5 SA NODE DYSFUNCTION (HCC): Primary | ICD-10-CM

## 2023-05-25 DIAGNOSIS — Z79.899 LONG TERM CURRENT USE OF ANTIARRHYTHMIC DRUG: ICD-10-CM

## 2023-05-25 DIAGNOSIS — Z95.0 PACEMAKER: ICD-10-CM

## 2023-05-25 DIAGNOSIS — I50.32 CHRONIC DIASTOLIC CONGESTIVE HEART FAILURE (HCC): ICD-10-CM

## 2023-05-25 DIAGNOSIS — I10 ESSENTIAL HYPERTENSION: ICD-10-CM

## 2023-05-25 DIAGNOSIS — I25.10 CORONARY ARTERY DISEASE INVOLVING NATIVE CORONARY ARTERY OF NATIVE HEART WITHOUT ANGINA PECTORIS: ICD-10-CM

## 2023-05-25 DIAGNOSIS — I47.29 NONSUSTAINED VENTRICULAR TACHYCARDIA (HCC): ICD-10-CM

## 2023-05-25 DIAGNOSIS — R06.02 SOB (SHORTNESS OF BREATH): ICD-10-CM

## 2023-05-25 DIAGNOSIS — I48.91 ATRIAL FIBRILLATION, UNSPECIFIED TYPE (HCC): ICD-10-CM

## 2023-05-25 ASSESSMENT — ENCOUNTER SYMPTOMS
SHORTNESS OF BREATH: 0
VOMITING: 0
EYES NEGATIVE: 1
DIARRHEA: 0
GASTROINTESTINAL NEGATIVE: 1
NAUSEA: 0
RESPIRATORY NEGATIVE: 1

## 2023-05-25 NOTE — PROGRESS NOTES
[facility] Progress Note                            Date:  5/25/2023  Patient: Obie Low  Age:  81 y.o., 1940      Reason for evaluation:         SUBJECTIVE:    Returns today for follow-up assessment for pacemaker sinus node dysfunction atrial fibrillation anticoagulation coronary artery disease chronic shortness of breath denies any bleeding issues denies chest pain dyspnea stable no new complaints or issues reported. Denies palpitations or tachycardia. Blood pressure 118/72 heart 61. Review of Systems   Constitutional: Negative. Negative for chills, fever and unexpected weight change. HENT: Negative. Eyes: Negative. Respiratory: Negative. Negative for shortness of breath. Cardiovascular: Negative. Negative for chest pain. Gastrointestinal: Negative. Negative for diarrhea, nausea and vomiting. Endocrine: Negative. Genitourinary: Negative. Musculoskeletal: Negative. Skin: Negative. Neurological: Negative. All other systems reviewed and are negative. OBJECTIVE:     /72   Pulse 61   Ht 6' (1.829 m)   Wt 225 lb (102.1 kg)   BMI 30.52 kg/m²     Labs:   CBC: No results for input(s): WBC, HGB, HCT, PLT in the last 72 hours. BMP:No results for input(s): NA, K, CO2, BUN, CREATININE, LABGLOM, GLUCOSE in the last 72 hours. BNP: No results for input(s): BNP in the last 72 hours. PT/INR: No results for input(s): PROTIME, INR in the last 72 hours. APTT:No results for input(s): APTT in the last 72 hours. CARDIAC ENZYMES:No results for input(s): CKTOTAL, CKMB, CKMBINDEX, TROPONINI in the last 72 hours. FASTING LIPID PANEL:  Lab Results   Component Value Date/Time    HDL 48 05/25/2022 01:59 AM    LDLCALC 34 05/25/2022 01:59 AM    TRIG 64 05/25/2022 01:59 AM     LIVER PROFILE:No results for input(s): AST, ALT, LABALBU in the last 72 hours.         Past Medical History:   Diagnosis Date    Acute liver failure without hepatic coma 10/23/2018

## 2023-06-01 ENCOUNTER — NURSE ONLY (OUTPATIENT)
Dept: UROLOGY | Age: 83
End: 2023-06-01

## 2023-06-01 VITALS — HEIGHT: 72 IN | WEIGHT: 225 LBS | BODY MASS INDEX: 30.48 KG/M2 | TEMPERATURE: 98.2 F

## 2023-06-01 DIAGNOSIS — N39.41 URGE INCONTINENCE: Primary | ICD-10-CM

## 2023-06-01 DIAGNOSIS — R35.0 URINARY FREQUENCY: ICD-10-CM

## 2023-06-01 NOTE — PROGRESS NOTES
Treatment # 2 wk maintenance     Improvement of Symptoms? Yes mild-mod    OAB/Urologic Medications? Gemtesa 75mg x4 wks (feels this is working better than Myrbetriq. Could not increase Myrbetriq due to CKD      Uroplasty Procedure:    1. Patient is seated with the left treatment leg elevated. 2. 34 gauge fine needle electrode is inserted into lower inner aspect of the leg. Slightly cephalad to the medial malleolus. 3. Surface electrode pad is placed over the medial aspect of the calcaneus on the same leg. 4.Needle electrode is connected to the external pulse generator. 5.The pulse generator is turned on and the millivolts used are 19. 6.Patient is treated for 30 minutes. 7.The needle and pad are removed. Patient will follow up in 2 weeks for next treatment.

## 2023-06-28 ENCOUNTER — TELEPHONE (OUTPATIENT)
Dept: UROLOGY | Age: 83
End: 2023-06-28

## 2023-06-28 NOTE — TELEPHONE ENCOUNTER
Feliciano Newsome called to reschedule an appointment for  Injection Uroplasty . Feliciano Newsome will not be able to make is appointment this afternoon and is needing to reschedule. [facility] was unable to accommodate in the time frame needed. Please be advised that the best time to call Anytime. Thank you.

## 2023-06-29 NOTE — PROGRESS NOTES
Call pt and she is advised gave message Spoke to Dr Ninoska Aj and he stated OK to DC today from his standpoint.

## 2023-07-06 ENCOUNTER — NURSE ONLY (OUTPATIENT)
Dept: UROLOGY | Age: 83
End: 2023-07-06
Payer: MEDICARE

## 2023-07-06 DIAGNOSIS — R35.0 URINARY FREQUENCY: ICD-10-CM

## 2023-07-06 DIAGNOSIS — N39.41 URGE INCONTINENCE: Primary | ICD-10-CM

## 2023-07-06 PROCEDURE — 64566 NEUROELTRD STIM POST TIBIAL: CPT | Performed by: NURSE PRACTITIONER

## 2023-07-06 NOTE — PROGRESS NOTES
Treatment # 2 wk maintenance     Improvement of Symptoms? Yes    OAB/Urologic Medications? Gemtesa 75mg       Uroplasty Procedure:    1. Patient is seated with the left treatment leg elevated. 2. 34 gauge fine needle electrode is inserted into lower inner aspect of the leg. Slightly cephalad to the medial malleolus. 3. Surface electrode pad is placed over the medial aspect of the calcaneus on the same leg. 4.Needle electrode is connected to the external pulse generator. 5.The pulse generator is turned on and the millivolts used are 19. 6.Patient is treated for 30 minutes. 7.The needle and pad are removed. Patient will follow up in 2 weeks for next treatment.

## 2023-07-17 ENCOUNTER — OFFICE VISIT (OUTPATIENT)
Dept: PULMONOLOGY | Age: 83
End: 2023-07-17
Payer: MEDICARE

## 2023-07-17 VITALS
OXYGEN SATURATION: 96 % | HEART RATE: 96 BPM | SYSTOLIC BLOOD PRESSURE: 130 MMHG | TEMPERATURE: 97.2 F | DIASTOLIC BLOOD PRESSURE: 74 MMHG | BODY MASS INDEX: 30.2 KG/M2 | WEIGHT: 223 LBS | HEIGHT: 72 IN

## 2023-07-17 DIAGNOSIS — J96.12 CHRONIC RESPIRATORY FAILURE WITH HYPOXIA AND HYPERCAPNIA (HCC): ICD-10-CM

## 2023-07-17 DIAGNOSIS — G47.33 OSA TREATED WITH BIPAP: Primary | ICD-10-CM

## 2023-07-17 DIAGNOSIS — I50.32 CHRONIC DIASTOLIC CONGESTIVE HEART FAILURE (HCC): ICD-10-CM

## 2023-07-17 DIAGNOSIS — J96.11 CHRONIC RESPIRATORY FAILURE WITH HYPOXIA AND HYPERCAPNIA (HCC): ICD-10-CM

## 2023-07-17 DIAGNOSIS — R06.02 SOB (SHORTNESS OF BREATH): ICD-10-CM

## 2023-07-17 DIAGNOSIS — J44.1 CHRONIC OBSTRUCTIVE PULMONARY DISEASE WITH (ACUTE) EXACERBATION (HCC): ICD-10-CM

## 2023-07-17 PROCEDURE — 99213 OFFICE O/P EST LOW 20 MIN: CPT | Performed by: INTERNAL MEDICINE

## 2023-07-17 PROCEDURE — G8427 DOCREV CUR MEDS BY ELIG CLIN: HCPCS | Performed by: INTERNAL MEDICINE

## 2023-07-17 PROCEDURE — 1123F ACP DISCUSS/DSCN MKR DOCD: CPT | Performed by: INTERNAL MEDICINE

## 2023-07-17 PROCEDURE — 3023F SPIROM DOC REV: CPT | Performed by: INTERNAL MEDICINE

## 2023-07-17 PROCEDURE — 3075F SYST BP GE 130 - 139MM HG: CPT | Performed by: INTERNAL MEDICINE

## 2023-07-17 PROCEDURE — G8417 CALC BMI ABV UP PARAM F/U: HCPCS | Performed by: INTERNAL MEDICINE

## 2023-07-17 PROCEDURE — 1036F TOBACCO NON-USER: CPT | Performed by: INTERNAL MEDICINE

## 2023-07-17 PROCEDURE — 3078F DIAST BP <80 MM HG: CPT | Performed by: INTERNAL MEDICINE

## 2023-07-17 ASSESSMENT — ENCOUNTER SYMPTOMS
BACK PAIN: 0
COUGH: 0
ABDOMINAL DISTENTION: 0
ANAL BLEEDING: 0
SHORTNESS OF BREATH: 1
WHEEZING: 0
RHINORRHEA: 0
CHEST TIGHTNESS: 0
APNEA: 0
ABDOMINAL PAIN: 0

## 2023-07-20 ENCOUNTER — NURSE ONLY (OUTPATIENT)
Dept: UROLOGY | Age: 83
End: 2023-07-20
Payer: MEDICARE

## 2023-07-20 VITALS — BODY MASS INDEX: 30.2 KG/M2 | WEIGHT: 223 LBS | HEIGHT: 72 IN | TEMPERATURE: 98.2 F

## 2023-07-20 DIAGNOSIS — N39.41 URGE INCONTINENCE: Primary | ICD-10-CM

## 2023-07-20 DIAGNOSIS — R35.0 URINARY FREQUENCY: ICD-10-CM

## 2023-07-20 PROCEDURE — 64566 NEUROELTRD STIM POST TIBIAL: CPT | Performed by: NURSE PRACTITIONER

## 2023-07-20 NOTE — PROGRESS NOTES
Treatment # 2 wk maintenance     Improvement of Symptoms? Yes moderate     OAB/Urologic Medications? Gemtesa       Uroplasty Procedure:    1. Patient is seated with the left treatment leg elevated. 2. 34 gauge fine needle electrode is inserted into lower inner aspect of the leg. Slightly cephalad to the medial malleolus. 3. Surface electrode pad is placed over the medial aspect of the calcaneus on the same leg. 4.Needle electrode is connected to the external pulse generator. 5.The pulse generator is turned on and the millivolts used are 19. 6.Patient is treated for 30 minutes. 7.The needle and pad are removed. Patient will follow up in 3 wks for next treatment.

## 2023-08-02 RX ORDER — MIRABEGRON 25 MG/1
TABLET, FILM COATED, EXTENDED RELEASE ORAL
Qty: 90 TABLET | OUTPATIENT
Start: 2023-08-02

## 2023-08-10 ENCOUNTER — NURSE ONLY (OUTPATIENT)
Dept: UROLOGY | Age: 83
End: 2023-08-10
Payer: MEDICARE

## 2023-08-10 VITALS — TEMPERATURE: 98.3 F | BODY MASS INDEX: 30.2 KG/M2 | HEIGHT: 72 IN | WEIGHT: 223 LBS

## 2023-08-10 DIAGNOSIS — N39.41 URGE INCONTINENCE: Primary | ICD-10-CM

## 2023-08-10 DIAGNOSIS — R35.0 URINARY FREQUENCY: ICD-10-CM

## 2023-08-10 PROCEDURE — 64566 NEUROELTRD STIM POST TIBIAL: CPT | Performed by: NURSE PRACTITIONER

## 2023-08-10 NOTE — PROGRESS NOTES
Treatment # 3 wk maintenance     Improvement of Symptoms? Yes moderate     OAB/Urologic Medications? Gemtesa       Uroplasty Procedure:    1. Patient is seated with the left treatment leg elevated. 2. 34 gauge fine needle electrode is inserted into lower inner aspect of the leg. Slightly cephalad to the medial malleolus. 3. Surface electrode pad is placed over the medial aspect of the calcaneus on the same leg. 4.Needle electrode is connected to the external pulse generator. 5.The pulse generator is turned on and the millivolts used are 19. 6.Patient is treated for 30 minutes. 7.The needle and pad are removed. Patient will follow up in 3 weeks for next treatment.

## 2023-08-22 PROCEDURE — 93294 REM INTERROG EVL PM/LDLS PM: CPT | Performed by: NURSE PRACTITIONER

## 2023-08-22 PROCEDURE — 93296 REM INTERROG EVL PM/IDS: CPT | Performed by: NURSE PRACTITIONER

## 2023-08-23 DIAGNOSIS — I49.5 SA NODE DYSFUNCTION (HCC): ICD-10-CM

## 2023-08-23 DIAGNOSIS — Z95.0 PACEMAKER: Primary | ICD-10-CM

## 2023-08-31 ENCOUNTER — NURSE ONLY (OUTPATIENT)
Dept: UROLOGY | Age: 83
End: 2023-08-31
Payer: MEDICARE

## 2023-08-31 VITALS — WEIGHT: 223 LBS | HEIGHT: 72 IN | TEMPERATURE: 98 F | BODY MASS INDEX: 30.2 KG/M2

## 2023-08-31 DIAGNOSIS — N39.41 URGE INCONTINENCE: Primary | ICD-10-CM

## 2023-08-31 DIAGNOSIS — R35.0 URINARY FREQUENCY: ICD-10-CM

## 2023-08-31 PROCEDURE — 64566 NEUROELTRD STIM POST TIBIAL: CPT | Performed by: NURSE PRACTITIONER

## 2023-08-31 NOTE — PROGRESS NOTES
Treatment # 3 wk maintenance     Improvement of Symptoms? Yes (recently ran out of flomax and symptoms have improved)     OAB/Urologic Medications? Gemtesa       Uroplasty Procedure:    1. Patient is seated with the left treatment leg elevated. 2. 34 gauge fine needle electrode is inserted into lower inner aspect of the leg. Slightly cephalad to the medial malleolus. 3. Surface electrode pad is placed over the medial aspect of the calcaneus on the same leg. 4.Needle electrode is connected to the external pulse generator. 5.The pulse generator is turned on and the millivolts used are 19. 6.Patient is treated for 30 minutes. 7.The needle and pad are removed. Patient will follow up in 3 wks for next treatment.

## 2023-09-07 ENCOUNTER — OFFICE VISIT (OUTPATIENT)
Dept: ENT CLINIC | Age: 83
End: 2023-09-07
Payer: MEDICARE

## 2023-09-07 ENCOUNTER — PROCEDURE VISIT (OUTPATIENT)
Dept: ENT CLINIC | Age: 83
End: 2023-09-07
Payer: MEDICARE

## 2023-09-07 VITALS — WEIGHT: 223 LBS | BODY MASS INDEX: 30.2 KG/M2 | HEIGHT: 72 IN

## 2023-09-07 DIAGNOSIS — H90.3 SENSORINEURAL HEARING LOSS (SNHL) OF BOTH EARS: Primary | ICD-10-CM

## 2023-09-07 PROCEDURE — G8417 CALC BMI ABV UP PARAM F/U: HCPCS | Performed by: PHYSICIAN ASSISTANT

## 2023-09-07 PROCEDURE — 1036F TOBACCO NON-USER: CPT | Performed by: PHYSICIAN ASSISTANT

## 2023-09-07 PROCEDURE — 99203 OFFICE O/P NEW LOW 30 MIN: CPT | Performed by: PHYSICIAN ASSISTANT

## 2023-09-07 PROCEDURE — 92567 TYMPANOMETRY: CPT | Performed by: AUDIOLOGIST

## 2023-09-07 PROCEDURE — G8427 DOCREV CUR MEDS BY ELIG CLIN: HCPCS | Performed by: PHYSICIAN ASSISTANT

## 2023-09-07 PROCEDURE — 1123F ACP DISCUSS/DSCN MKR DOCD: CPT | Performed by: PHYSICIAN ASSISTANT

## 2023-09-07 PROCEDURE — 92553 AUDIOMETRY AIR & BONE: CPT | Performed by: AUDIOLOGIST

## 2023-09-07 ASSESSMENT — ENCOUNTER SYMPTOMS
PHOTOPHOBIA: 0
VOICE CHANGE: 0
TROUBLE SWALLOWING: 0
SORE THROAT: 0
SINUS PAIN: 0
SINUS PRESSURE: 0
RHINORRHEA: 0
FACIAL SWELLING: 0
EYE PAIN: 0

## 2023-09-07 NOTE — ASSESSMENT & PLAN NOTE
Bilateral sensorineural hearing loss  Plan: I recommended the patient to do a another hearing aid trial at [facility]. He was advised that if he does not like his hearing aids after the trial, could recommend a referral for possible cochlear implants if desired. He was reminded to call if he has any questions or problems.

## 2023-09-07 NOTE — PROGRESS NOTES
History   Moise Ch is a 81 y.o. male who presented to the clinic this date with complaints of decreased hearing bilaterally. He has long standing hearing loss in both ears. He noted hearing has gotten worse in the last couple of years. He noted increased difficulty hearing and understanding some people and some things on the TV. His last hearing evaluation was performed in February 2019 and showed a moderate to profound SNHL bilaterally. He has binaural hearing aids but does not feel he gets benefit from them. He has history of sinus congestion and noted his ears stop up when sinus congestion gets worse. He is currently having symptoms of aural fullness bilaterally, worse in the left ear. He has been treated with abx and noted a slight improvement in symptoms. Summary   Tympanometry consistent with normal TM mobility bilaterally. Pure tone testing indicates moderate to profound SNHL bilaterally. When compared to audiogram obtained in 2019, thresholds showed a slight drop bilaterally. Results   Otoscopy:   Right: Clear EAC/Normal TM  Left: Clear EAC/Normal TM    Audiometry:   Right:  Moderate to profound  sloping SNHL  Left:  Moderate to profound  sloping SNHL         Tympanometry:    Right: Type A  Left: Type A          Plan   Results of today's testing were discussed with Mr. Naina Robles and the following recommendations were made: Follow up with ENT as scheduled. Continue wearing binaural hearing aids. Monitor hearing yearly, sooner with changes. Hearing protection as warranted.         Audiogram and Acoustic Immittance

## 2023-09-07 NOTE — PROGRESS NOTES
Appearance: well developed  and well nourished  Head/ Face: normocephalic and atraumatic  Vocal Quality: good/ normal  Ears: Right Ear: External: external ears normal Otoscopy Ear Canal: cerumen Otoscopy TM: TM's normal and TM's mobile Left Ear: External: external ears normal Otoscopy Ear Canal: cerumen Otoscopy TM: TM's normal and TM's mobile  Hearing: Rinne A>B: Left, Rinne A>B: Right, and Kapoor M  Nose: nares normal and septum midline  Neck: supple and adenopathy none palpable  Thyroid: normal  Oral exam demonstrated the tongue to be midline with no abnormalities to the posterior pharynx. Assessment & Plan:    Problem List Items Addressed This Visit       Sensorineural hearing loss (SNHL) of both ears - Primary     Bilateral sensorineural hearing loss  Plan: I recommended the patient to do a another [facility] aid trial [facility]. He was advised that if he does not like his hearing aids after the trial, could recommend a referral for possible cochlear implants if desired. He was reminded to call if he has any questions or problems. No orders of the defined types were placed in this encounter. No orders of the defined types were placed in this encounter. Electronically signed by Merna Wetzel PA-C on 9/7/23 at 2:44 PM CDT        Please note that this chart was generated using dragon dictation software. Although every effort was made to ensure the accuracy of this automated transcription, some errors in transcription may have occurred.

## 2023-09-25 ENCOUNTER — TELEPHONE (OUTPATIENT)
Dept: ENT CLINIC | Age: 83
End: 2023-09-25

## 2023-09-25 DIAGNOSIS — H90.3 SENSORINEURAL HEARING LOSS (SNHL) OF BOTH EARS: Primary | ICD-10-CM

## 2023-09-25 NOTE — TELEPHONE ENCOUNTER
Patient called and said he has used hearing aids for years and would prefer to go ahead with the cochlear implants. Patient asked if he needed to see fabian watters again or if he could refer him to someone for the implants. Please return patient's call. Patient called reporting that he is not doing well with his hearing aids and is requesting a second opinion for hearing options. We will refer patient to Dr. Brook Lee for evaluation and treatment.       Electronically signed by Derrick Redd PA-C on 9/25/23 at 5:51 PM CDT

## 2023-09-27 ENCOUNTER — NURSE ONLY (OUTPATIENT)
Dept: UROLOGY | Age: 83
End: 2023-09-27
Payer: MEDICARE

## 2023-09-27 DIAGNOSIS — R35.0 URINARY FREQUENCY: ICD-10-CM

## 2023-09-27 DIAGNOSIS — N39.41 URGE INCONTINENCE: Primary | ICD-10-CM

## 2023-09-27 LAB — POST VOID RESIDUAL (PVR): 0 ML

## 2023-09-27 PROCEDURE — 51798 US URINE CAPACITY MEASURE: CPT | Performed by: NURSE PRACTITIONER

## 2023-09-27 PROCEDURE — 64566 NEUROELTRD STIM POST TIBIAL: CPT | Performed by: NURSE PRACTITIONER

## 2023-09-27 NOTE — PROGRESS NOTES
Treatment # Maintenance     Improvement of Symptoms? Yes, mild     OAB/Urologic Medications? Gemtesa       Uroplasty Procedure:    1. Patient is seated with the left treatment leg elevated. 2. 34 gauge fine needle electrode is inserted into lower inner aspect of the leg. Slightly cephalad to the medial malleolus. 3. Surface electrode pad is placed over the medial aspect of the calcaneus on the same leg. 4.Needle electrode is connected to the external pulse generator. 5.The pulse generator is turned on and the millivolts used are 19. 6.Patient is treated for 30 minutes. 7.The needle and pad are removed. Patient will follow up in 4 wks for next treatment. Patient with refractory urinary symptoms. Continues to have leakage daily. This is somewhat improved with Lillie and uroplasty. Although patient continues to be unhappy with his urination. We will send to [facility] urology for further recommendations. We discussed InterStim and Botox. He has had mild improvement in symptoms with uroplasty, not significant. Patient reports he stopped his Flomax several weeks ago which she felt improved his symptoms although he is requesting to restart this. Bladder scan today is 0 mL PVR. We will restart his Flomax per request every other day.

## 2023-10-17 ENCOUNTER — TELEPHONE (OUTPATIENT)
Dept: UROLOGY | Facility: CLINIC | Age: 83
End: 2023-10-17
Payer: MEDICARE

## 2023-10-31 ENCOUNTER — NURSE ONLY (OUTPATIENT)
Dept: UROLOGY | Age: 83
End: 2023-10-31
Payer: MEDICARE

## 2023-10-31 DIAGNOSIS — R35.0 URINARY FREQUENCY: ICD-10-CM

## 2023-10-31 DIAGNOSIS — N39.41 URGE INCONTINENCE: Primary | ICD-10-CM

## 2023-10-31 PROCEDURE — 64566 NEUROELTRD STIM POST TIBIAL: CPT | Performed by: NURSE PRACTITIONER

## 2023-10-31 NOTE — PROGRESS NOTES
Treatment # Maintenance     Improvement of Symptoms? Yes mild    OAB/Urologic Medications? Was on Gemtesa. Felt this was ineffective and expensive. Therefore wants to be placed back on Myrbetriq 25mg       Uroplasty Procedure:    1. Patient is seated with the left treatment leg elevated. 2. 34 gauge fine needle electrode is inserted into lower inner aspect of the leg. Slightly cephalad to the medial malleolus. 3. Surface electrode pad is placed over the medial aspect of the calcaneus on the same leg. 4.Needle electrode is connected to the external pulse generator. 5.The pulse generator is turned on and the millivolts used are 19. 6.Patient is treated for 30 minutes. 7.The needle and pad are removed. Patient will follow up in 1 month for next treatment.

## 2023-11-29 DIAGNOSIS — I49.5 SA NODE DYSFUNCTION (HCC): ICD-10-CM

## 2023-11-29 DIAGNOSIS — Z95.0 PACEMAKER: Primary | ICD-10-CM

## 2023-12-01 NOTE — PROGRESS NOTES
Subjective    Mr. Fournier is 83 y.o. male    Chief Complaint: urinary incontinence    History of Present Illness    84-year-old male new patient referred by [facility] urology for refractory urge incontinence.  Has tried and failed Myrbetriq, Gemtesa, and PTNS as well as Flomax.  Was recently started back on low-dose Myrbetriq however is not seeing any improvement in symptoms.  Reports is unable to take the full strength Myrbetriq due to chronic kidney disease renal failure currently followed by nephrology with a GFR of 19.  Depends usage at all times anywhere from 2-4 daily.  Nocturia every 2 hours.  Urinating every 30 minutes to an hour.  Patient is on diuretics as well.  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 Sanchez for which undergoes every 6-month surveillance cystoscopies last of which being February 2023.  No recurrences.      The following portions of the patient's history were reviewed and updated as appropriate: allergies, current medications, past family history, past medical history, past social history, past surgical history and problem list.    Review of Systems   Constitutional:  Negative for chills and fever.   Gastrointestinal:  Negative for abdominal pain, anal bleeding, blood in stool, nausea and vomiting.   Genitourinary:  Positive for frequency and urgency. Negative for dysuria and hematuria.         Current Outpatient Medications:     amiodarone (PACERONE) 200 MG tablet, Take 1 tablet by mouth Daily., Disp: , Rfl:     atorvastatin (LIPITOR) 20 MG tablet, Take 1 tablet by mouth Daily., Disp: , Rfl:     bumetanide (BUMEX) 1 MG tablet, Take 1 tablet by mouth Every 12 (Twelve) Hours., Disp: , Rfl:     Diclofenac Sodium (VOLTAREN) 1 % gel gel, Apply 4 g topically to the appropriate area as directed 4 (Four) Times a Day., Disp: , Rfl:     folic acid (FOLVITE) 1 MG tablet, Take 1 tablet by mouth Daily., Disp: , Rfl:     meloxicam (MOBIC) 15 MG  "tablet, Take 1 tablet by mouth Daily., Disp: , Rfl:     metoprolol succinate XL (TOPROL-XL) 100 MG 24 hr tablet, Take 1 tablet by mouth Daily., Disp: , Rfl:     Mirabegron ER (MYRBETRIQ) 25 MG tablet sustained-release 24 hour 24 hr tablet, Take 1 tablet by mouth Daily., Disp: , Rfl:     tamsulosin (FLOMAX) 0.4 MG capsule 24 hr capsule, Take 1 capsule by mouth Daily., Disp: , Rfl:     vitamin D (ERGOCALCIFEROL) 1.25 MG (14030 UT) capsule capsule, Take 1 capsule by mouth 1 (One) Time Per Week., Disp: , Rfl:     History reviewed. No pertinent past medical history.    History reviewed. No pertinent surgical history.    Social History     Socioeconomic History    Marital status:        History reviewed. No pertinent family history.    Objective    Temp 97.7 °F (36.5 °C)   Ht 177.8 cm (70\")   Wt 102 kg (224 lb 3.2 oz)   BMI 32.17 kg/m²     Physical Exam  Constitutional:       Appearance: Normal appearance.   Abdominal:      Tenderness: There is no right CVA tenderness or left CVA tenderness.   Skin:     General: Skin is warm and dry.   Neurological:      Mental Status: He is alert and oriented to person, place, and time.   Psychiatric:         Mood and Affect: Mood normal.         Behavior: Behavior normal.             Results for orders placed or performed in visit on 11/03/23   POC Urinalysis Dipstick, Multipro    Specimen: Urine   Result Value Ref Range    Color Yellow Yellow, Straw, Dark Yellow, Aparna    Clarity, UA Clear Clear    Glucose, UA Negative Negative mg/dL    Bilirubin Negative Negative    Ketones, UA Negative Negative    Specific Gravity  1.010 1.005 - 1.030    Blood, UA Trace (A) Negative    pH, Urine 5.5 5.0 - 8.0    Protein, POC Negative Negative mg/dL    Urobilinogen, UA 0.2 E.U./dL Normal, 0.2 E.U./dL    Nitrite, UA Negative Negative    Leukocytes Small (1+) (A) Negative   Estimation of residual urine via abdominal ultrasound  Residual Urine: 88 ml  Indication: incontinence  Position: " Supine  Examination: Incremental scanning of the suprapubic area using 3 MHz transducer using copious amounts of acoustic gel.   Findings: An anechoic area was demonstrated which represented the bladder, with measurement of residual urine as noted. I inspected this myself. In that the residual urine was stable or insignificant, no treatment will be necessary at this time.    IPSS Questionnaire (AUA-7):  Incomplete emptying  Over the past month, how often have you had a sensation of not emptying your bladder completely after you finished urinating?: Not at all (12/11/23 1102)  Frequency  Over the past month, how often have you had to urinate again less than two hours after you finishied urinating ?: Almost always (12/11/23 1102)  Intermittency  Over the past month, how often have you found you stopped and started again several time when you urinated ?: Not at all (12/11/23 1102)  Urgency  Over the last month, how often have you found it difficult  have you found it difficult to postpone urination ?: Almost always (12/11/23 1102)  Weak Stream  Over the past month, how often have you had a weak urinary stream ?: Not at all (12/11/23 1102)  Straining  Over the past month, how often have you had to push or strain to begin urination ?: Not at all (12/11/23 1102)  Nocturia  Over the past month, how many times did you most typically get up to urinate from the time you went to bed until the time you got up in the morning ?: 4 times (12/11/23 1102)  Quality of life due to urinary symptoms  If you were to spend the rest of your life with your urinary condition the way it is now, how would feel about that?: Terrible (12/11/23 1102)    Scores  Total IPSS Score: (!) 14 (12/11/23 1102)  Total Score = Symptomatic Level: Moderately symptomatic: 8-19 (12/11/23 1102)       Assessment and Plan    Diagnoses and all orders for this visit:    1. Urinary incontinence, unspecified type [R32] (Primary)    2. Malignant neoplasm of urinary  bladder, unspecified site      Refractory urge incontinence has tried and failed Myrbetriq, Gemtesa, Flomax and PTNS.    Last cystoscopy February 2023-unremarkable    Patient was provided information regarding Botox injection therapy versus sacral neurostimulator.  Patient is interested in Botox and would like a consultation

## 2023-12-05 RX ORDER — AMIODARONE HYDROCHLORIDE 200 MG/1
200 TABLET ORAL DAILY
Qty: 30 TABLET | Refills: 5 | Status: SHIPPED | OUTPATIENT
Start: 2023-12-05

## 2023-12-07 ENCOUNTER — OFFICE VISIT (OUTPATIENT)
Dept: CARDIOLOGY CLINIC | Age: 83
End: 2023-12-07
Payer: MEDICARE

## 2023-12-07 VITALS
HEART RATE: 61 BPM | BODY MASS INDEX: 30.34 KG/M2 | DIASTOLIC BLOOD PRESSURE: 74 MMHG | SYSTOLIC BLOOD PRESSURE: 124 MMHG | WEIGHT: 224 LBS | HEIGHT: 72 IN

## 2023-12-07 DIAGNOSIS — I50.32 CHRONIC DIASTOLIC CONGESTIVE HEART FAILURE (HCC): ICD-10-CM

## 2023-12-07 DIAGNOSIS — Z95.0 PACEMAKER: ICD-10-CM

## 2023-12-07 DIAGNOSIS — I49.5 SA NODE DYSFUNCTION (HCC): ICD-10-CM

## 2023-12-07 DIAGNOSIS — I48.91 ATRIAL FIBRILLATION, UNSPECIFIED TYPE (HCC): ICD-10-CM

## 2023-12-07 DIAGNOSIS — R06.02 SOB (SHORTNESS OF BREATH): ICD-10-CM

## 2023-12-07 DIAGNOSIS — I25.10 CORONARY ARTERY DISEASE INVOLVING NATIVE CORONARY ARTERY OF NATIVE HEART WITHOUT ANGINA PECTORIS: ICD-10-CM

## 2023-12-07 DIAGNOSIS — I10 ESSENTIAL HYPERTENSION: ICD-10-CM

## 2023-12-07 DIAGNOSIS — I47.29 NONSUSTAINED VENTRICULAR TACHYCARDIA (HCC): Primary | ICD-10-CM

## 2023-12-07 PROCEDURE — 3078F DIAST BP <80 MM HG: CPT | Performed by: INTERNAL MEDICINE

## 2023-12-07 PROCEDURE — 1036F TOBACCO NON-USER: CPT | Performed by: INTERNAL MEDICINE

## 2023-12-07 PROCEDURE — 1123F ACP DISCUSS/DSCN MKR DOCD: CPT | Performed by: INTERNAL MEDICINE

## 2023-12-07 PROCEDURE — G8427 DOCREV CUR MEDS BY ELIG CLIN: HCPCS | Performed by: INTERNAL MEDICINE

## 2023-12-07 PROCEDURE — 93000 ELECTROCARDIOGRAM COMPLETE: CPT | Performed by: INTERNAL MEDICINE

## 2023-12-07 PROCEDURE — G8484 FLU IMMUNIZE NO ADMIN: HCPCS | Performed by: INTERNAL MEDICINE

## 2023-12-07 PROCEDURE — G8417 CALC BMI ABV UP PARAM F/U: HCPCS | Performed by: INTERNAL MEDICINE

## 2023-12-07 PROCEDURE — 99213 OFFICE O/P EST LOW 20 MIN: CPT | Performed by: INTERNAL MEDICINE

## 2023-12-07 PROCEDURE — 3074F SYST BP LT 130 MM HG: CPT | Performed by: INTERNAL MEDICINE

## 2023-12-07 ASSESSMENT — ENCOUNTER SYMPTOMS
EYES NEGATIVE: 1
GASTROINTESTINAL NEGATIVE: 1
SHORTNESS OF BREATH: 0
RESPIRATORY NEGATIVE: 1
DIARRHEA: 0
NAUSEA: 0
VOMITING: 0

## 2023-12-11 ENCOUNTER — OFFICE VISIT (OUTPATIENT)
Dept: UROLOGY | Facility: CLINIC | Age: 83
End: 2023-12-11
Payer: MEDICARE

## 2023-12-11 ENCOUNTER — PATIENT ROUNDING (BHMG ONLY) (OUTPATIENT)
Dept: UROLOGY | Facility: CLINIC | Age: 83
End: 2023-12-11
Payer: MEDICARE

## 2023-12-11 VITALS — HEIGHT: 70 IN | WEIGHT: 224.2 LBS | TEMPERATURE: 97.7 F | BODY MASS INDEX: 32.1 KG/M2

## 2023-12-11 DIAGNOSIS — R32 URINARY INCONTINENCE, UNSPECIFIED TYPE: Primary | ICD-10-CM

## 2023-12-11 DIAGNOSIS — C67.9 MALIGNANT NEOPLASM OF URINARY BLADDER, UNSPECIFIED SITE: ICD-10-CM

## 2023-12-11 PROCEDURE — 1160F RVW MEDS BY RX/DR IN RCRD: CPT

## 2023-12-11 PROCEDURE — 1159F MED LIST DOCD IN RCRD: CPT

## 2023-12-11 PROCEDURE — 99204 OFFICE O/P NEW MOD 45 MIN: CPT

## 2023-12-11 PROCEDURE — 81001 URINALYSIS AUTO W/SCOPE: CPT

## 2023-12-11 PROCEDURE — 51798 US URINE CAPACITY MEASURE: CPT

## 2023-12-11 RX ORDER — FOLIC ACID 1 MG/1
1 TABLET ORAL DAILY
COMMUNITY
Start: 2023-12-04

## 2023-12-11 RX ORDER — ATORVASTATIN CALCIUM 20 MG/1
1 TABLET, FILM COATED ORAL DAILY
COMMUNITY
Start: 2023-10-04

## 2023-12-11 RX ORDER — MELOXICAM 15 MG/1
1 TABLET ORAL DAILY
COMMUNITY
Start: 2023-11-17

## 2023-12-11 RX ORDER — METOPROLOL SUCCINATE 100 MG/1
100 TABLET, EXTENDED RELEASE ORAL DAILY
COMMUNITY

## 2023-12-11 RX ORDER — TAMSULOSIN HYDROCHLORIDE 0.4 MG/1
1 CAPSULE ORAL DAILY
COMMUNITY

## 2023-12-11 RX ORDER — AMIODARONE HYDROCHLORIDE 200 MG/1
200 TABLET ORAL DAILY
COMMUNITY
Start: 2023-12-05

## 2023-12-11 RX ORDER — ERGOCALCIFEROL 1.25 MG/1
1 CAPSULE ORAL WEEKLY
COMMUNITY
Start: 2023-10-22

## 2023-12-11 RX ORDER — BUMETANIDE 1 MG/1
1 TABLET ORAL EVERY 12 HOURS SCHEDULED
COMMUNITY
Start: 2023-11-25

## 2023-12-11 NOTE — PROGRESS NOTES
December 11, 2023    Hello, may I speak with Zachariah Shantanu?    My name is Mily      I am  with [facility]  [address] [place] [address], [address]  [facility] [id number] [phone].    Before we get started may I verify your date of birth? 1940    I am calling to officially welcome you to our practice and ask about your recent visit. Is this a good time to talk? yes    Tell me about your visit with us. What things went well?  I suppose.       We're always looking for ways to make our patients' experiences even better. Do you have recommendations on ways we may improve?  no    Overall were you satisfied with your first visit to our practice? yes       I appreciate you taking the time to speak with me today. Is there anything else I can do for you? no      Thank you, and have a great day.

## 2023-12-12 ENCOUNTER — NURSE ONLY (OUTPATIENT)
Dept: UROLOGY | Age: 83
End: 2023-12-12
Payer: MEDICARE

## 2023-12-12 DIAGNOSIS — R35.0 URINARY FREQUENCY: ICD-10-CM

## 2023-12-12 DIAGNOSIS — N39.41 URGE INCONTINENCE: Primary | ICD-10-CM

## 2023-12-12 PROCEDURE — 64566 NEUROELTRD STIM POST TIBIAL: CPT | Performed by: NURSE PRACTITIONER

## 2023-12-12 NOTE — PROGRESS NOTES
Treatment # maintenance     Improvement of Symptoms? mild    OAB/Urologic Medications? Myrbetriq 25mg. Can't increase due to CKD. Failed Gemtesa- too expensive      Uroplasty Procedure:    1. Patient is seated with the left treatment leg elevated. 2. 34 gauge fine needle electrode is inserted into lower inner aspect of the leg. Slightly cephalad to the medial malleolus. 3. Surface electrode pad is placed over the medial aspect of the calcaneus on the same leg. 4.Needle electrode is connected to the external pulse generator. 5.The pulse generator is turned on and the millivolts used are 19. 6.Patient is treated for 30 minutes. 7.The needle and pad are removed. Saw ELISABETH at [facility] yesterday. Plan for consult botox. F/U with Merrill Freeman for cysto Feb hx of bladder cancer.  UC prior

## 2024-01-24 NOTE — PROGRESS NOTES
Subjective    Mr. Fournier is 83 y.o. male    Chief Complaint: Discuss Botox    History of Present Illness  Patient here to discuss Botox.  She was initially referred by [facility] urology.  He has failed Myrbetriq Gemtesa alpha-blocker therapy with Flomax and percutaneous tibial nerve stimulation.  Patient also has a history of papillary transitional cell carcinoma of the bladder TA diagnosed December 2018 last cystoscopic examination January 2024.  No history of recurrent disease. Cystoscopy showed mild lateral lobe hypertrophy of prostate with no median lobe. AUA 11/35 with frequency, urgency, nocturia X 3. PVR 12/23 88c.    The following portions of the patient's history were reviewed and updated as appropriate: allergies, current medications, past family history, past medical history, past social history, past surgical history and problem list.    Review of Systems      Current Outpatient Medications:     amiodarone (PACERONE) 200 MG tablet, Take 1 tablet by mouth Daily., Disp: , Rfl:     atorvastatin (LIPITOR) 20 MG tablet, Take 1 tablet by mouth Daily., Disp: , Rfl:     bumetanide (BUMEX) 1 MG tablet, Take 1 tablet by mouth Every 12 (Twelve) Hours., Disp: , Rfl:     folic acid (FOLVITE) 1 MG tablet, Take 1 tablet by mouth Daily., Disp: , Rfl:     meloxicam (MOBIC) 15 MG tablet, Take 1 tablet by mouth Daily., Disp: , Rfl:     metoprolol succinate XL (TOPROL-XL) 100 MG 24 hr tablet, Take 1 tablet by mouth Daily., Disp: , Rfl:     Mirabegron ER (MYRBETRIQ) 25 MG tablet sustained-release 24 hour 24 hr tablet, Take 1 tablet by mouth Daily., Disp: , Rfl:     tamsulosin (FLOMAX) 0.4 MG capsule 24 hr capsule, Take 1 capsule by mouth Daily., Disp: , Rfl:     vitamin D (ERGOCALCIFEROL) 1.25 MG (39795 UT) capsule capsule, Take 1 capsule by mouth 1 (One) Time Per Week., Disp: , Rfl:     cephalexin (KEFLEX) 500 MG capsule, Take 1 capsule by mouth 3 (Three) Times a Day for 3 days., Disp: 9 capsule, Rfl: 0    History reviewed.  "No pertinent past medical history.    History reviewed. No pertinent surgical history.    Social History     Socioeconomic History    Marital status:    Tobacco Use    Smoking status: Former     Types: Cigarettes     Quit date:      Years since quittin.1     Passive exposure: Never    Smokeless tobacco: Never   Substance and Sexual Activity    Alcohol use: Not Currently    Drug use: Never    Sexual activity: Defer       History reviewed. No pertinent family history.    Objective    Temp 98.5 °F (36.9 °C)   Ht 182.9 cm (72\")   Wt 104 kg (228 lb 3.2 oz)   BMI 30.95 kg/m²     Physical Exam        Results for orders placed or performed in visit on 24   POC Urinalysis Dipstick, Multipro    Specimen: Urine   Result Value Ref Range    Color Yellow Yellow, Straw, Dark Yellow, Aparna    Clarity, UA Clear Clear    Glucose, UA Negative Negative mg/dL    Bilirubin Negative Negative    Ketones, UA Negative Negative    Specific Gravity  1.020 1.005 - 1.030    Blood, UA Negative Negative    pH, Urine 5.5 5.0 - 8.0    Protein, POC 30 mg/dL (A) Negative mg/dL    Urobilinogen, UA 0.2 E.U./dL Normal, 0.2 E.U./dL    Nitrite, UA Negative Negative    Leukocytes Small (1+) (A) Negative     IPSS Questionnaire (AUA-7):  Incomplete emptying  Over the past month, how often have you had a sensation of not emptying your bladder completely after you finished urinating?: Not at all (24)  Frequency  Over the past month, how often have you had to urinate again less than two hours after you finishied urinating ?: About half the time (24)  Intermittency  Over the past month, how often have you found you stopped and started again several time when you urinated ?: Not at all (24)  Urgency  Over the last month, how often have you found it difficult  have you found it difficult to postpone urination ?: Almost always (24)  Weak Stream  Over the past month, how often have you had a weak " urinary stream ?: Not at all (02/07/24 1301)  Straining  Over the past month, how often have you had to push or strain to begin urination ?: Not at all (02/07/24 1301)  Nocturia  Over the past month, how many times did you most typically get up to urinate from the time you went to bed until the time you got up in the morning ?: 3 times (02/07/24 1301)  Quality of life due to urinary symptoms  If you were to spend the rest of your life with your urinary condition the way it is now, how would feel about that?: Unhappy (02/07/24 1301)    Scores  Total IPSS Score: (!) 11 (02/07/24 1301)  Total Score = Symptomatic Level: Moderately symptomatic: 8-19 (02/07/24 1301)        Assessment and Plan    Diagnoses and all orders for this visit:    1. Urge incontinence of urine (Primary)  -     POC Urinalysis Dipstick, Multipro  -     cephalexin (KEFLEX) 500 MG capsule; Take 1 capsule by mouth 3 (Three) Times a Day for 3 days.  Dispense: 9 capsule; Refill: 0    2. BPH with urinary obstruction    3. History of bladder cancer          Patient is failed anti-cholinergic as well as Myrbetriq.  The patient has refractory urge incontinence.  I discussed options today with the patient in depth.  I discussed percutaneous tibial nerve stimulation, InterStim therapy, and Botox.  We discussed side effects of the treatment at each.  I did discuss with the patient that we do not offer percutaneous tibial nerve stimulation here.    Regarding Botox versus InterStim, I did discuss recent study that showed a slight advantage to Botox versus InterStim.  I specifically discussed with the patient the risk for urinary tract infection as well as need for intermittent catheterization.  I also discussed the procedure will have to be repeated every 4-6 months.    Reviewed records summarized in HPI.

## 2024-01-29 ENCOUNTER — PROCEDURE VISIT (OUTPATIENT)
Dept: UROLOGY | Age: 84
End: 2024-01-29
Payer: MEDICARE

## 2024-01-29 VITALS — BODY MASS INDEX: 31.29 KG/M2 | WEIGHT: 231 LBS | TEMPERATURE: 97.9 F | HEIGHT: 72 IN

## 2024-01-29 DIAGNOSIS — Z85.51 HISTORY OF BLADDER CANCER: ICD-10-CM

## 2024-01-29 LAB
BILIRUBIN, POC: NORMAL
BLOOD URINE, POC: NORMAL
CLARITY, POC: CLEAR
COLOR, POC: YELLOW
GLUCOSE URINE, POC: NORMAL
KETONES, POC: NORMAL
LEUKOCYTE EST, POC: NORMAL
NITRITE, POC: NORMAL
PH, POC: 6
PROTEIN, POC: NORMAL
SPECIFIC GRAVITY, POC: 1.01
UROBILINOGEN, POC: 1

## 2024-01-29 PROCEDURE — 81003 URINALYSIS AUTO W/O SCOPE: CPT | Performed by: UROLOGY

## 2024-01-29 PROCEDURE — 52000 CYSTOURETHROSCOPY: CPT | Performed by: UROLOGY

## 2024-01-29 NOTE — PROGRESS NOTES
BLADDER CANCER  Patient was first diagnosed with bladder cancer approximately 5 years(s) ago.  He has low risk bladder cancer he has not had recurrence since diagnosis  Last Recurrence: Initial diagnosis was December 2018 has not had recurrence  Stage of bladder cancer at last recurrence: 8130/2 - Papillary transitional cell carcinoma, non-invasive, stage TA  Grade: low grade  Last urinary cytology/FISH results: not done; Date:   Hematuria?  None  Last upper tract study: 3 year(s) ago.  Study was a ultrasound done 8/26/2021 showed a right renal cyst.  CT scan done on 7/31/2020    Patient follows with ELISABETH Fu for OAB is on Myrbetriq he also is undergoing Uroplasty PTNS, is [facility]    Cystoscopy Operative Note (1/29/24)  Surgeon: Dwain Zuniga MD  Anesthesia: Urethral 2% Xylocaine   Indications: History of bladder cancer  Position: Supine    Findings:   The patient was prepped and draped in the usual sterile fashion.  The flexible cystoscope was advanced through the urethra and into the bladder under direct vision.  The bladder was thoroughly inspected and the following was noted:    Residual Urine: mild  Urethra: normal appearing urethra with no masses, tenderness or lesions  Prostate: Mild lateral lobe enlargement of prostate; median lobe present? no.    Bladder: No tumors or CIS noted.  No bladder diverticulum.  There was mild trabeculation noted.  No abnormal mucosal lesions seen  Ureters: Clear efflux from both ureters.  Orifices with normal configuration and location.    The cystoscope was removed.  The patient tolerated the procedure well.  The patient was given a post procedure instructions and follow up.    Results for orders placed or performed in visit on 01/29/24   POCT Urinalysis No Micro (Auto)   Result Value Ref Range    Color, UA yellow     Clarity, UA clear     Glucose, UA POC -     Bilirubin, UA -     Ketones, UA -     Spec Grav, UA 1.010     Blood,

## 2024-02-07 ENCOUNTER — OFFICE VISIT (OUTPATIENT)
Dept: UROLOGY | Facility: CLINIC | Age: 84
End: 2024-02-07
Payer: MEDICARE

## 2024-02-07 VITALS — TEMPERATURE: 98.5 F | BODY MASS INDEX: 30.91 KG/M2 | HEIGHT: 72 IN | WEIGHT: 228.2 LBS

## 2024-02-07 DIAGNOSIS — Z85.51 HISTORY OF BLADDER CANCER: ICD-10-CM

## 2024-02-07 DIAGNOSIS — N13.8 BPH WITH URINARY OBSTRUCTION: ICD-10-CM

## 2024-02-07 DIAGNOSIS — N40.1 BPH WITH URINARY OBSTRUCTION: ICD-10-CM

## 2024-02-07 DIAGNOSIS — N39.41 URGE INCONTINENCE OF URINE: Primary | ICD-10-CM

## 2024-02-07 LAB
BILIRUB BLD-MCNC: NEGATIVE MG/DL
CLARITY, POC: CLEAR
COLOR UR: YELLOW
GLUCOSE UR STRIP-MCNC: NEGATIVE MG/DL
KETONES UR QL: NEGATIVE
LEUKOCYTE EST, POC: ABNORMAL
NITRITE UR-MCNC: NEGATIVE MG/ML
PH UR: 5.5 [PH] (ref 5–8)
PROT UR STRIP-MCNC: ABNORMAL MG/DL
RBC # UR STRIP: NEGATIVE /UL
SP GR UR: 1.02 (ref 1–1.03)
UROBILINOGEN UR QL: ABNORMAL

## 2024-02-07 RX ORDER — CEPHALEXIN 500 MG/1
500 CAPSULE ORAL 3 TIMES DAILY
Qty: 9 CAPSULE | Refills: 0 | Status: SHIPPED | OUTPATIENT
Start: 2024-02-07 | End: 2024-02-10

## 2024-02-28 ENCOUNTER — PROCEDURE VISIT (OUTPATIENT)
Dept: UROLOGY | Facility: CLINIC | Age: 84
End: 2024-02-28
Payer: MEDICARE

## 2024-02-28 DIAGNOSIS — N39.41 URGE INCONTINENCE OF URINE: Primary | ICD-10-CM

## 2024-02-28 LAB
BILIRUB BLD-MCNC: NEGATIVE MG/DL
CLARITY, POC: CLEAR
COLOR UR: YELLOW
GLUCOSE UR STRIP-MCNC: NEGATIVE MG/DL
KETONES UR QL: NEGATIVE
LEUKOCYTE EST, POC: ABNORMAL
NITRITE UR-MCNC: NEGATIVE MG/ML
PH UR: 5.5 [PH] (ref 5–8)
PROT UR STRIP-MCNC: ABNORMAL MG/DL
RBC # UR STRIP: ABNORMAL /UL
SP GR UR: 1.01 (ref 1–1.03)
UROBILINOGEN UR QL: ABNORMAL

## 2024-02-28 NOTE — PROGRESS NOTES
Pre- operative diagnosis:  Refractory Urge Incontinence/OAB    Post operative diagnosis:  Same    Procedure:   The patient was prepped and draped in a normal sterile fashion.  The urethra and bladder was anesthetized with 30cc of 2% lidocaine jelly.  A rigid cystoscope was introduced per urethra.  The urethra is normal in appearance without obstruction or mass.  The bladder is without evidence of mucosal lesion.   There is no abnormality of the urothelium.  There is minimal trabeculation of the detrusor muscle.  The ureteral orifices are relatively normal in position and they efflux clear urine.  The standard Botox injection needle was placed through the rigid cystourethroscope.  100 units of Botox was injected in the bladder in the standard grid fashion of 4 rows of 5 injections 0.5cc each, all of them posterior to the trigone.  1.5 mL of normal saline were used for flush at the conclusion.  There was no bleeding noted from the bladder mucosa.  The bladder was drained and the scope was removed.    Patient tolerated the procedure well    Complications: none    Blood loss: minimal    Follow up:    Routine follow up in 6 months

## 2024-02-29 DIAGNOSIS — Z95.0 PACEMAKER: Primary | ICD-10-CM

## 2024-02-29 DIAGNOSIS — I49.5 SA NODE DYSFUNCTION (HCC): ICD-10-CM

## 2024-03-11 RX ORDER — TAMSULOSIN HYDROCHLORIDE 0.4 MG/1
0.4 CAPSULE ORAL DAILY
Qty: 90 CAPSULE | Refills: 3 | Status: SHIPPED | OUTPATIENT
Start: 2024-03-11

## 2024-03-26 ENCOUNTER — OFFICE VISIT (OUTPATIENT)
Dept: PULMONOLOGY | Age: 84
End: 2024-03-26
Payer: MEDICARE

## 2024-03-26 VITALS
OXYGEN SATURATION: 97 % | SYSTOLIC BLOOD PRESSURE: 154 MMHG | WEIGHT: 222 LBS | HEART RATE: 60 BPM | TEMPERATURE: 97.8 F | BODY MASS INDEX: 30.07 KG/M2 | HEIGHT: 72 IN | DIASTOLIC BLOOD PRESSURE: 72 MMHG

## 2024-03-26 DIAGNOSIS — G47.33 OSA TREATED WITH BIPAP: Primary | ICD-10-CM

## 2024-03-26 DIAGNOSIS — J96.11 CHRONIC RESPIRATORY FAILURE WITH HYPOXIA AND HYPERCAPNIA (HCC): ICD-10-CM

## 2024-03-26 DIAGNOSIS — J96.12 CHRONIC RESPIRATORY FAILURE WITH HYPOXIA AND HYPERCAPNIA (HCC): ICD-10-CM

## 2024-03-26 DIAGNOSIS — I50.32 CHRONIC DIASTOLIC CONGESTIVE HEART FAILURE (HCC): ICD-10-CM

## 2024-03-26 DIAGNOSIS — R06.02 SOB (SHORTNESS OF BREATH): ICD-10-CM

## 2024-03-26 PROCEDURE — 1036F TOBACCO NON-USER: CPT | Performed by: INTERNAL MEDICINE

## 2024-03-26 PROCEDURE — 1123F ACP DISCUSS/DSCN MKR DOCD: CPT | Performed by: INTERNAL MEDICINE

## 2024-03-26 PROCEDURE — 3077F SYST BP >= 140 MM HG: CPT | Performed by: INTERNAL MEDICINE

## 2024-03-26 PROCEDURE — G8484 FLU IMMUNIZE NO ADMIN: HCPCS | Performed by: INTERNAL MEDICINE

## 2024-03-26 PROCEDURE — G8427 DOCREV CUR MEDS BY ELIG CLIN: HCPCS | Performed by: INTERNAL MEDICINE

## 2024-03-26 PROCEDURE — 99214 OFFICE O/P EST MOD 30 MIN: CPT | Performed by: INTERNAL MEDICINE

## 2024-03-26 PROCEDURE — G8417 CALC BMI ABV UP PARAM F/U: HCPCS | Performed by: INTERNAL MEDICINE

## 2024-03-26 PROCEDURE — 3078F DIAST BP <80 MM HG: CPT | Performed by: INTERNAL MEDICINE

## 2024-03-26 ASSESSMENT — ENCOUNTER SYMPTOMS
SHORTNESS OF BREATH: 1
ABDOMINAL DISTENTION: 0
COUGH: 0
RHINORRHEA: 0
WHEEZING: 0
ANAL BLEEDING: 0
APNEA: 0
ABDOMINAL PAIN: 0
BACK PAIN: 0
CHEST TIGHTNESS: 0

## 2024-03-26 NOTE — PROGRESS NOTES
Pulmonary and Sleep Medicine    Ashwin Valera (:  1940) is a 83 y.o. male,Established patient, here for evaluation of the following chief complaint(s):  Follow-up (6 mo f/u   GREGORIO)      Referring physician:  No referring provider defined for this encounter.     ASSESSMENT/PLAN:  1. GREGORIO treated with BiPAP  2. Chronic respiratory failure with hypoxia and hypercapnia (HCC). on oxygen and bipap  3. Chronic diastolic congestive heart failure (HCC)  4. SOB (shortness of breath)        Will continue with current management with the BIPAP, he is compliant with the BIPAP and feels the BiPAP helps him significantly.        Jet Holt MD, [facility], [facility]    No follow-ups on file.    SUBJECTIVE/OBJECTIVE:        He is here for follow up on sleep apnea and chronic resp failure. He is using the BiPAP and is compliant with the BIPAP and feels the BIPAP is helping him.         Prior to Visit Medications    Medication Sig Taking? Authorizing Provider   tamsulosin (FLOMAX) 0.4 MG capsule TAKE 1 CAPSULE BY MOUTH DAILY Yes Yuliya Davila APRN - CNP   amiodarone (CORDARONE) 200 MG tablet TAKE 1 TABLET BY MOUTH DAILY Yes Danna Alston APRN   mirabegron (MYRBETRIQ) 25 MG TB24 Take 1 tablet by mouth daily Yes Yuliya Davila APRN - CNP   aspirin 81 MG EC tablet Take 1 tablet by mouth daily Yes David Durham MD   glipiZIDE (GLUCOTROL XL) 5 MG extended release tablet Take 1.25 mg by mouth daily Takes a half a day Yes Provider, MD Loc   TRULANCE 3 MG TABS TAKE 1 TABLET BY MOUTH DAILY Yes Rosaura Navarrete APRN - NP   pantoprazole (PROTONIX) 20 MG tablet Take 2 tablets by mouth daily Yes Provider, Historical, MD   SENNA PLUS 8.6-50 MG per tablet  Yes Provider, MD Loc   atorvastatin (LIPITOR) 20 MG tablet Take 1 tablet by mouth daily Yes Sonido Warren MD   metoprolol succinate (TOPROL XL) 100 MG extended release tablet Take 1 tablet by mouth daily Yes Sonido Warren MD   NIFEdipine

## 2024-04-29 ENCOUNTER — OFFICE VISIT (OUTPATIENT)
Dept: UROLOGY | Age: 84
End: 2024-04-29
Payer: MEDICARE

## 2024-04-29 VITALS — HEIGHT: 72 IN | WEIGHT: 224 LBS | TEMPERATURE: 97.2 F | BODY MASS INDEX: 30.34 KG/M2

## 2024-04-29 DIAGNOSIS — N39.41 URGE INCONTINENCE: ICD-10-CM

## 2024-04-29 DIAGNOSIS — Z85.51 HISTORY OF BLADDER CANCER: ICD-10-CM

## 2024-04-29 DIAGNOSIS — N32.81 OAB (OVERACTIVE BLADDER): Primary | ICD-10-CM

## 2024-04-29 LAB
APPEARANCE FLUID: CLEAR
BILIRUBIN, POC: NORMAL
BLOOD URINE, POC: NORMAL
CLARITY, POC: CLEAR
COLOR, POC: YELLOW
GLUCOSE URINE, POC: NORMAL
KETONES, POC: NORMAL
LEUKOCYTE EST, POC: NORMAL
NITRITE, POC: NORMAL
PH, POC: 5.5
POST VOID RESIDUAL (PVR): 0 ML
PROTEIN, POC: NORMAL
SPECIFIC GRAVITY, POC: 1.01
UROBILINOGEN, POC: 0.2

## 2024-04-29 PROCEDURE — 1036F TOBACCO NON-USER: CPT | Performed by: NURSE PRACTITIONER

## 2024-04-29 PROCEDURE — 51798 US URINE CAPACITY MEASURE: CPT | Performed by: NURSE PRACTITIONER

## 2024-04-29 PROCEDURE — 1123F ACP DISCUSS/DSCN MKR DOCD: CPT | Performed by: NURSE PRACTITIONER

## 2024-04-29 PROCEDURE — G8417 CALC BMI ABV UP PARAM F/U: HCPCS | Performed by: NURSE PRACTITIONER

## 2024-04-29 PROCEDURE — G8427 DOCREV CUR MEDS BY ELIG CLIN: HCPCS | Performed by: NURSE PRACTITIONER

## 2024-04-29 PROCEDURE — 99213 OFFICE O/P EST LOW 20 MIN: CPT | Performed by: NURSE PRACTITIONER

## 2024-04-29 PROCEDURE — 81002 URINALYSIS NONAUTO W/O SCOPE: CPT | Performed by: NURSE PRACTITIONER

## 2024-04-29 NOTE — PROGRESS NOTES
Ashwin Valera is a 83 y.o. male who presents today   Chief Complaint   Patient presents with    Follow-up     I am here today for my 3 mo incontinence/BPH follow up        Patient is an 83-year-old male presents clinic today for follow-up.  Has a history of OAB is currently maintained on Myrbetriq 25 mg has failed uroplasty.  Now receiving Botox injections with Dr. Eller.  His last procedure on 2/28/2024.  Overall urge incontinence has improved he continues to have frequency and urgency although sounds appropriate given his fluid intake and diuretics.  He is only requiring 1 pad per day.  Significant improvement from prior.  Follows with Dr. Zuniga with a history of bladder cancer.  Is scheduled to follow-up in January 2025 for a cystoscopy.      BLADDER CANCER  Patient was first diagnosed with bladder cancer approximately 5 years(s) ago.  He has low risk bladder cancer he has not had recurrence since diagnosis  Last Recurrence: Initial diagnosis was December 2018 has not had recurrence  Stage of bladder cancer at last recurrence: 8130/2 - Papillary transitional cell carcinoma, non-invasive, stage TA  Grade: low grade  Last urinary cytology/FISH results: not done; Date:   Hematuria?  None  Last upper tract study: 3 year(s) ago.  Study was a ultrasound done 8/26/2021 showed a right renal cyst.  CT scan done on 7/31/2020     Past Medical History:   Diagnosis Date    Acute liver failure without hepatic coma 10/23/2018    Back pain     \"with tired legs as a result\"    Bladder cancer (HCC) 12/19/2018    Blood circulation, collateral     Carotid arterial disease (HCC)     recent surgery    CKD (chronic kidney disease), 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/2018    Constipation     COPD with acute lower respiratory infection (HCC) 02/24/2021    Epigastric discomfort     GERD (gastroesophageal reflux disease)     Hyperlipidemia     Hypertension     Hypertension     Pacemaker     Palliative care patient 10/23/2018    Pneumonia due to

## 2024-04-30 ASSESSMENT — ENCOUNTER SYMPTOMS
ABDOMINAL DISTENTION: 0
VOMITING: 0
ABDOMINAL PAIN: 0
NAUSEA: 0
BACK PAIN: 0

## 2024-05-06 NOTE — PROGRESS NOTES
Continuous pulse ox alarming. Upon entering room, O2 sat was 81%. Pt woke up when I walked in and O2 sat immediately increased to 93%. Pt stated he felt fine and had no complaints. I believe it might have been sensor related.  Electronically signed by Winferd Sandhoff, RN on 2/24/2021 at 1:49 AM Detail Level: Detailed Quality 128: Preventive Care And Screening: Body Mass Index (Bmi) Screening And Follow-Up Plan: BMI is documented above normal parameters and a follow-up plan is documented

## 2024-05-30 DIAGNOSIS — Z95.0 PACEMAKER: Primary | ICD-10-CM

## 2024-05-30 DIAGNOSIS — I49.5 SA NODE DYSFUNCTION (HCC): ICD-10-CM

## 2024-06-03 ENCOUNTER — OFFICE VISIT (OUTPATIENT)
Dept: CARDIOLOGY CLINIC | Age: 84
End: 2024-06-03
Payer: MEDICARE

## 2024-06-03 VITALS
BODY MASS INDEX: 30.34 KG/M2 | HEART RATE: 60 BPM | DIASTOLIC BLOOD PRESSURE: 80 MMHG | WEIGHT: 224 LBS | HEIGHT: 72 IN | SYSTOLIC BLOOD PRESSURE: 138 MMHG

## 2024-06-03 DIAGNOSIS — Z79.899 LONG TERM CURRENT USE OF ANTIARRHYTHMIC DRUG: Primary | ICD-10-CM

## 2024-06-03 DIAGNOSIS — I48.91 ATRIAL FIBRILLATION, UNSPECIFIED TYPE (HCC): ICD-10-CM

## 2024-06-03 PROCEDURE — 99213 OFFICE O/P EST LOW 20 MIN: CPT | Performed by: INTERNAL MEDICINE

## 2024-06-03 PROCEDURE — G8427 DOCREV CUR MEDS BY ELIG CLIN: HCPCS | Performed by: INTERNAL MEDICINE

## 2024-06-03 PROCEDURE — 1036F TOBACCO NON-USER: CPT | Performed by: INTERNAL MEDICINE

## 2024-06-03 PROCEDURE — 3075F SYST BP GE 130 - 139MM HG: CPT | Performed by: INTERNAL MEDICINE

## 2024-06-03 PROCEDURE — 1123F ACP DISCUSS/DSCN MKR DOCD: CPT | Performed by: INTERNAL MEDICINE

## 2024-06-03 PROCEDURE — 93000 ELECTROCARDIOGRAM COMPLETE: CPT | Performed by: INTERNAL MEDICINE

## 2024-06-03 PROCEDURE — G8417 CALC BMI ABV UP PARAM F/U: HCPCS | Performed by: INTERNAL MEDICINE

## 2024-06-03 PROCEDURE — 3079F DIAST BP 80-89 MM HG: CPT | Performed by: INTERNAL MEDICINE

## 2024-06-03 ASSESSMENT — ENCOUNTER SYMPTOMS
RESPIRATORY NEGATIVE: 1
NAUSEA: 0
VOMITING: 0
SHORTNESS OF BREATH: 0
DIARRHEA: 0
EYES NEGATIVE: 1
GASTROINTESTINAL NEGATIVE: 1

## 2024-06-03 NOTE — PROGRESS NOTES
[facility]                            Date:  6/3/2024  Patient: Ashwin Valera  Age:  83 y.o., 1940      Reason for evaluation:         SUBJECTIVE:    Returns today follow-up assessment.  Device interrogated 5/30/2024 functioning appropriately.  No significant atrial fibrillation burden longevity 6.5 to 6.9 years.  Dyspnea stable has intermittent edema a lot of excoriations about his ankles where he develops apparently blisters or vesicles and ends up scratching quite a bit.  Nothing looks infected today.  Denies chest pain dyspnea stable no other complaints or issues reported.  Blood pressure 138/80 heart 60.    Review of Systems   Constitutional: Negative.  Negative for chills, fever and unexpected weight change.   HENT: Negative.     Eyes: Negative.    Respiratory: Negative.  Negative for shortness of breath.    Cardiovascular: Negative.  Negative for chest pain.   Gastrointestinal: Negative.  Negative for diarrhea, nausea and vomiting.   Endocrine: Negative.    Genitourinary: Negative.    Musculoskeletal: Negative.    Skin: Negative.    Neurological: Negative.    All other systems reviewed and are negative.        OBJECTIVE:    /80   Pulse 60   Ht 1.829 m (6')   Wt 101.6 kg (224 lb)   BMI 30.38 kg/m²     Labs:   CBC: No results for input(s): \"WBC\", \"HGB\", \"HCT\", \"PLT\" in the last 72 hours.  BMP:No results for input(s): \"NA\", \"K\", \"CO2\", \"BUN\", \"CREATININE\", \"LABGLOM\", \"GLUCOSE\" in the last 72 hours.  BNP: No results for input(s): \"BNP\" in the last 72 hours.  PT/INR: No results for input(s): \"PROTIME\", \"INR\" in the last 72 hours.  APTT:No results for input(s): \"APTT\" in the last 72 hours.  CARDIAC ENZYMES:No results for input(s): \"CKTOTAL\", \"CKMB\", \"CKMBINDEX\", \"TROPONINI\" in the last 72 hours.  FASTING LIPID PANEL:  Lab Results   Component Value Date/Time    HDL 48 05/25/2022 01:59 AM    TRIG 64 05/25/2022 01:59 AM     LIVER PROFILE:No results for input(s): \"AST\",

## 2024-06-04 ENCOUNTER — OFFICE VISIT (OUTPATIENT)
Dept: INTERNAL MEDICINE | Facility: CLINIC | Age: 84
End: 2024-06-04
Payer: MEDICARE

## 2024-06-04 VITALS
RESPIRATION RATE: 16 BRPM | WEIGHT: 225.4 LBS | HEIGHT: 72 IN | OXYGEN SATURATION: 97 % | DIASTOLIC BLOOD PRESSURE: 75 MMHG | BODY MASS INDEX: 30.53 KG/M2 | SYSTOLIC BLOOD PRESSURE: 137 MMHG | HEART RATE: 60 BPM

## 2024-06-04 DIAGNOSIS — I50.32 CHRONIC HEART FAILURE WITH PRESERVED EJECTION FRACTION: ICD-10-CM

## 2024-06-04 DIAGNOSIS — R26.81 GAIT INSTABILITY: ICD-10-CM

## 2024-06-04 DIAGNOSIS — N18.4 STAGE 4 CHRONIC KIDNEY DISEASE: ICD-10-CM

## 2024-06-04 DIAGNOSIS — N18.4 TYPE 2 DIABETES MELLITUS WITH STAGE 4 CHRONIC KIDNEY DISEASE, WITHOUT LONG-TERM CURRENT USE OF INSULIN: ICD-10-CM

## 2024-06-04 DIAGNOSIS — E11.22 TYPE 2 DIABETES MELLITUS WITH STAGE 4 CHRONIC KIDNEY DISEASE, WITHOUT LONG-TERM CURRENT USE OF INSULIN: ICD-10-CM

## 2024-06-04 DIAGNOSIS — G89.29 CHRONIC BILATERAL LOW BACK PAIN WITHOUT SCIATICA: ICD-10-CM

## 2024-06-04 DIAGNOSIS — G47.33 OSA TREATED WITH BIPAP: Primary | ICD-10-CM

## 2024-06-04 DIAGNOSIS — E78.2 MIXED HYPERLIPIDEMIA: ICD-10-CM

## 2024-06-04 DIAGNOSIS — G47.34 NOCTURNAL HYPOXEMIA: ICD-10-CM

## 2024-06-04 DIAGNOSIS — I65.23 BILATERAL CAROTID ARTERY STENOSIS: ICD-10-CM

## 2024-06-04 DIAGNOSIS — E66.09 CLASS 1 OBESITY DUE TO EXCESS CALORIES WITH SERIOUS COMORBIDITY AND BODY MASS INDEX (BMI) OF 30.0 TO 30.9 IN ADULT: ICD-10-CM

## 2024-06-04 DIAGNOSIS — M54.50 CHRONIC BILATERAL LOW BACK PAIN WITHOUT SCIATICA: ICD-10-CM

## 2024-06-04 PROBLEM — I35.0 MILD AORTIC STENOSIS BY PRIOR ECHOCARDIOGRAPHY: Status: ACTIVE | Noted: 2024-06-04

## 2024-06-04 PROBLEM — J44.9 COPD (CHRONIC OBSTRUCTIVE PULMONARY DISEASE): Status: ACTIVE | Noted: 2021-02-24

## 2024-06-04 PROBLEM — E55.9 VITAMIN D DEFICIENCY: Status: ACTIVE | Noted: 2021-03-16

## 2024-06-04 PROBLEM — I50.30 HEART FAILURE WITH PRESERVED EJECTION FRACTION: Status: ACTIVE | Noted: 2024-06-04

## 2024-06-04 PROBLEM — E11.8 TYPE 2 DIABETES MELLITUS WITH COMPLICATION, WITHOUT LONG-TERM CURRENT USE OF INSULIN: Status: ACTIVE | Noted: 2021-01-21

## 2024-06-04 PROBLEM — Z85.51 HISTORY OF BLADDER CANCER: Status: ACTIVE | Noted: 2018-12-19

## 2024-06-04 PROBLEM — I70.213 ATHEROSCLEROSIS OF NATIVE ARTERY OF BOTH LOWER EXTREMITIES WITH INTERMITTENT CLAUDICATION: Status: ACTIVE | Noted: 2018-06-25

## 2024-06-04 PROBLEM — E66.811 CLASS 1 OBESITY IN ADULT: Status: ACTIVE | Noted: 2021-03-25

## 2024-06-04 PROBLEM — N18.9 CHRONIC KIDNEY DISEASE: Status: ACTIVE | Noted: 2021-02-24

## 2024-06-04 PROBLEM — J44.0 COPD WITH ACUTE LOWER RESPIRATORY INFECTION: Status: ACTIVE | Noted: 2021-02-24

## 2024-06-04 PROBLEM — E66.9 CLASS 1 OBESITY IN ADULT: Status: ACTIVE | Noted: 2021-03-25

## 2024-06-04 PROBLEM — N39.41 URGE INCONTINENCE OF URINE: Status: ACTIVE | Noted: 2022-05-24

## 2024-06-04 PROBLEM — C67.9 BLADDER CANCER: Status: ACTIVE | Noted: 2018-12-19

## 2024-06-04 PROBLEM — E78.5 HYPERLIPIDEMIA: Status: ACTIVE | Noted: 2022-05-24

## 2024-06-04 PROBLEM — I34.0 MILD MITRAL REGURGITATION BY PRIOR ECHOCARDIOGRAPHY: Status: ACTIVE | Noted: 2024-06-04

## 2024-06-04 PROBLEM — M48.061 LUMBAR SPINAL STENOSIS: Status: ACTIVE | Noted: 2024-06-04

## 2024-06-04 PROBLEM — I35.1 MILD AORTIC REGURGITATION: Status: ACTIVE | Noted: 2024-06-04

## 2024-06-04 PROBLEM — E13.40 NEUROPATHY DUE TO SECONDARY DIABETES: Status: ACTIVE | Noted: 2024-06-04

## 2024-06-04 PROCEDURE — 99204 OFFICE O/P NEW MOD 45 MIN: CPT | Performed by: INTERNAL MEDICINE

## 2024-06-04 PROCEDURE — 1160F RVW MEDS BY RX/DR IN RCRD: CPT | Performed by: INTERNAL MEDICINE

## 2024-06-04 PROCEDURE — 1159F MED LIST DOCD IN RCRD: CPT | Performed by: INTERNAL MEDICINE

## 2024-06-04 RX ORDER — AMOXICILLIN 250 MG
1 CAPSULE ORAL DAILY
COMMUNITY
Start: 2024-03-20 | End: 2024-06-04

## 2024-06-04 RX ORDER — METOLAZONE 2.5 MG/1
2.5 TABLET ORAL EVERY OTHER DAY
COMMUNITY
Start: 2024-03-20

## 2024-06-04 RX ORDER — BUMETANIDE 1 MG/1
1 TABLET ORAL EVERY 12 HOURS SCHEDULED
Qty: 90 TABLET | Refills: 1 | Status: SHIPPED | OUTPATIENT
Start: 2024-06-04

## 2024-06-04 RX ORDER — NIFEDIPINE 60 MG/1
1 TABLET, EXTENDED RELEASE ORAL DAILY
COMMUNITY
Start: 2024-04-20

## 2024-06-04 RX ORDER — GLIPIZIDE 10 MG/1
10 TABLET ORAL DAILY
COMMUNITY
Start: 2024-05-30 | End: 2024-06-04

## 2024-06-04 NOTE — PROGRESS NOTES
CC: establish care for diabetes  Transfer of care from Dr. Valente    History:  Zachariah Fournier is a 83 y.o. male who presents today for evaluation of the above problems.        History of Present Illness  The patient is an 83-year-old male who presents for evaluation of multiple medical concerns.    The patient regularly monitors his blood glucose levels at home, which typically range between 100 and 120. He has experienced two instances of hypoglycemia, characterized by convulsions and uncontrollable frailiness in his arms and legs, necessitating two hospital admissions. His blood glucose levels have not exceeded 150. He is currently on a regimen of glipizide, administered thrice weekly.    He has CHF with preserved EF and does have edema that bothers him at times, but with the use of diuretics he does well most all of the time.      He has an overactive bladder for which he takes myrbetriq despite his diuretic use and he does follow with Dr. Sanchez given history of bladder cancer.     He sleeps with a CPAP with oxygen. His pulmonologist told him that his breathing is good with his lungs.     He does not take baby aspirin despite known carotid stenosis and PAD every day because he bruises so easy. He has bruises all over his chest and neck from scratching. If he bumps into something, the bruise is about gone away, but if he bumps into something, he bruises easily. He talked to Dr. Valente about it and he was told to go ahead and stop it.     He has been having some balance issues and instability with his gait. He has fallen from a seated position a couple of times while sitting on a rolling stool and also when trying to sit on his bed.       ROS:  Review of Systems   Constitutional:  Negative for chills and fever.   Respiratory:  Negative for cough and shortness of breath.    Cardiovascular:  Positive for leg swelling. Negative for chest pain and palpitations.   Gastrointestinal:  Negative for abdominal pain and  "constipation.   Genitourinary:  Positive for difficulty urinating and frequency. Negative for dysuria and hematuria.   Skin:  Positive for wound.   Hematological:  Bruises/bleeds easily.       Allergies   Allergen Reactions    Apixaban Other (See Comments)     \"almost bled to death\"    Promethazine Hcl Other (See Comments)     He became encephalopathic for several hours and was not responsive.     Past Medical History:   Diagnosis Date    Cancer     Diabetes mellitus     HL (hearing loss)     Hypertension     Kidney disease      Past Surgical History:   Procedure Laterality Date    BACK SURGERY      2005    PACEMAKER IMPLANTATION  2022     Family History   Problem Relation Age of Onset    Colon cancer Father     Diabetes Brother       reports that he quit smoking about 24 years ago. His smoking use included cigarettes. He has never been exposed to tobacco smoke. He has never used smokeless tobacco. He reports current alcohol use. He reports that he does not use drugs.      Current Outpatient Medications:     amiodarone (PACERONE) 200 MG tablet, Take 1 tablet by mouth Daily., Disp: , Rfl:     atorvastatin (LIPITOR) 20 MG tablet, Take 1 tablet by mouth Daily., Disp: , Rfl:     bumetanide (BUMEX) 1 MG tablet, Take 1 tablet by mouth Every 12 (Twelve) Hours., Disp: 90 tablet, Rfl: 1    folic acid (FOLVITE) 1 MG tablet, Take 1 tablet by mouth Daily., Disp: , Rfl:     metOLazone (ZAROXOLYN) 2.5 MG tablet, Take 1 tablet by mouth Every Other Day., Disp: , Rfl:     metoprolol succinate XL (TOPROL-XL) 100 MG 24 hr tablet, Take 1 tablet by mouth Daily., Disp: , Rfl:     Mirabegron ER (MYRBETRIQ) 25 MG tablet sustained-release 24 hour 24 hr tablet, Take 1 tablet by mouth Daily., Disp: , Rfl:     NIFEdipine XL (PROCARDIA XL) 60 MG 24 hr tablet, Take 1 tablet by mouth Daily., Disp: , Rfl:     Plecanatide 3 MG tablet, Take 1 tablet by mouth Daily., Disp: , Rfl:     Polypodium Leucotomos (HELIOCARE PO), , Disp: , Rfl:     Sennosides " "(SENNA LAXATIVE PO), Take 2 capsules by mouth Daily., Disp: , Rfl:     tamsulosin (FLOMAX) 0.4 MG capsule 24 hr capsule, Take 1 capsule by mouth Daily., Disp: , Rfl:     vitamin D (ERGOCALCIFEROL) 1.25 MG (06735 UT) capsule capsule, Take 1 capsule by mouth 1 (One) Time Per Week., Disp: , Rfl:     OBJECTIVE:  /75 (BP Location: Left arm, Patient Position: Sitting, Cuff Size: Adult)   Pulse 60   Resp 16   Ht 182.9 cm (72\")   Wt 102 kg (225 lb 6.4 oz)   SpO2 97%   BMI 30.57 kg/m²    Physical Exam  Constitutional:       General: He is not in acute distress.  Cardiovascular:      Rate and Rhythm: Normal rate and regular rhythm.      Heart sounds: Normal heart sounds. No murmur heard.  Pulmonary:      Effort: Pulmonary effort is normal.      Breath sounds: Normal breath sounds. No wheezing.   Musculoskeletal:      Right lower leg: Edema (2+) present.      Left lower leg: Edema (2+) present.   Neurological:      Mental Status: He is alert and oriented to person, place, and time.      Gait: Gait normal.   Psychiatric:         Mood and Affect: Mood normal.         Behavior: Behavior normal.           Results      Assessment/Plan    Diagnoses and all orders for this visit:    1. FINA treated with BiPAP (Primary)  2. Nocturnal hypoxemia  Adherent without new symptoms.     3. Chronic heart failure with preserved ejection fraction  -     bumetanide (BUMEX) 1 MG tablet; Take 1 tablet by mouth Every 12 (Twelve) Hours.  Dispense: 90 tablet; Refill: 1  He is on BB and diuretic at this time without evidence of decompensation or exacerbation. Following with [facility].     4. Type 2 diabetes mellitus with stage 4 chronic kidney disease, without long-term current use of insulin  -     Hemoglobin A1c; Future  -     Lipid panel; Future  Stop glipizide given hypoglycemia. Check A1c with labs. We will seek records from [facility].    5. Class 1 obesity due to excess calories with serious comorbidity and body mass index (BMI) of " 30.0 to 30.9 in adult  BMI is >= 30 and <35. (Class 1 Obesity). The following options were offered after discussion;: exercise counseling/recommendations and nutrition counseling/recommendations     6. Mixed hyperlipidemia  Stable on moderate intensity statin therapy per ACC/AHA guidelines.    7. Stage 4 chronic kidney disease    8. Bilateral carotid artery stenosis  Not on ASA, but continues on statin. Check lipids with labs.     9. Chronic bilateral low back pain without sciatica  10. Gait instability  -     Ambulatory Referral to Physical Therapy  Given chronic back pain and gait instability, we will refer to PT for strengthening and pain relief techniques.     Assessment & Plan        An After Visit Summary was printed and given to the patient at discharge.  Return in about 4 months (around 10/4/2024) for Recheck.       Patient or patient representative verbalized consent for the use of Ambient Listening during the visit with  Mat Hankins DO for chart documentation. 6/4/2024  17:43 CDT    Mat Hankins D.O. 6/4/2024   Electronically signed.

## 2024-06-05 PROBLEM — C44.92 SQUAMOUS CELL CARCINOMA OF SKIN: Status: ACTIVE | Noted: 2024-06-05

## 2024-06-14 LAB
CHOLEST SERPL-MCNC: 106 MG/DL (ref 100–199)
HBA1C MFR BLD: 5.7 % (ref 4.8–5.6)
HDLC SERPL-MCNC: 33 MG/DL
LDLC SERPL CALC-MCNC: 47 MG/DL (ref 0–99)
TRIGL SERPL-MCNC: 154 MG/DL (ref 0–149)
VLDLC SERPL CALC-MCNC: 26 MG/DL (ref 5–40)

## 2024-06-19 DIAGNOSIS — I48.91 ATRIAL FIBRILLATION, UNSPECIFIED TYPE (HCC): ICD-10-CM

## 2024-06-19 DIAGNOSIS — Z79.899 LONG TERM CURRENT USE OF ANTIARRHYTHMIC DRUG: ICD-10-CM

## 2024-07-08 RX ORDER — ATORVASTATIN CALCIUM 20 MG/1
20 TABLET, FILM COATED ORAL DAILY
Qty: 90 TABLET | Refills: 3 | Status: SHIPPED | OUTPATIENT
Start: 2024-07-08

## 2024-07-08 RX ORDER — AMIODARONE HYDROCHLORIDE 200 MG/1
200 TABLET ORAL DAILY
Qty: 30 TABLET | Refills: 3 | Status: SHIPPED | OUTPATIENT
Start: 2024-07-08

## 2024-07-08 RX ORDER — FOLIC ACID 1 MG/1
1000 TABLET ORAL DAILY
Qty: 30 TABLET | Refills: 3 | Status: SHIPPED | OUTPATIENT
Start: 2024-07-08

## 2024-07-08 NOTE — TELEPHONE ENCOUNTER
Rx Refill Note  Requested Prescriptions     Pending Prescriptions Disp Refills    amiodarone (PACERONE) 200 MG tablet       Sig: Take 1 tablet by mouth Daily.    folic acid (FOLVITE) 1 MG tablet       Sig: Take 1 tablet by mouth Daily.    atorvastatin (LIPITOR) 20 MG tablet 90 tablet      Sig: Take 1 tablet by mouth Daily.      Last office visit with prescribing clinician: 6/4/2024   Last telemedicine visit with prescribing clinician: Visit date not found   Next office visit with prescribing clinician: 10/17/2024                         Would you like a call back once the refill request has been completed: [] Yes [] No    If the office needs to give you a call back, can they leave a voicemail: [] Yes [] No    Mat Kern MA  07/08/24, 13:56 CDT

## 2024-07-08 NOTE — TELEPHONE ENCOUNTER
"    Caller:     ShantnauZachariah \"Festus\"        Relationship: SELF     Best call back number:     [phone]        Requested Prescriptions:   amiodarone (PACERONE) 200 MG tablet  200 mg, Daily          folic acid (FOLVITE) 1 MG tablet  1 tablet, Daily       atorvastatin (LIPITOR) 20 MG tablet  1 tablet, Daily     Pharmacy where request should be sent:  [id number] [place] - [address] AT [facility] [address] - [phone]  - [phone]  [phone]    Last office visit with prescribing clinician: 6/4/2024   Last telemedicine visit with prescribing clinician: Visit date not found   Next office visit with prescribing clinician: 10/17/2024     Additional details provided by patient: NONE     Does the patient have less than a 3 day supply:  [] Yes  [x] No    Would you like a call back once the refill request has been completed: [] Yes [x] No    If the office needs to give you a call back, can they leave a voicemail: [x] Yes [] No    Dick Martin Rep   07/08/24 12:55 CDT         "

## 2024-07-16 ENCOUNTER — HOSPITAL ENCOUNTER (OUTPATIENT)
Dept: GENERAL RADIOLOGY | Age: 84
Discharge: HOME | End: 2024-07-16
Payer: MEDICARE

## 2024-07-16 DIAGNOSIS — I48.91 ATRIAL FIBRILLATION, UNSPECIFIED TYPE (HCC): ICD-10-CM

## 2024-07-16 DIAGNOSIS — Z79.899 LONG TERM CURRENT USE OF ANTIARRHYTHMIC DRUG: ICD-10-CM

## 2024-07-16 PROCEDURE — 71046 X-RAY EXAM CHEST 2 VIEWS: CPT

## 2024-08-20 ENCOUNTER — TELEPHONE (OUTPATIENT)
Dept: UROLOGY | Facility: CLINIC | Age: 84
End: 2024-08-20
Payer: MEDICARE

## 2024-08-20 NOTE — TELEPHONE ENCOUNTER
Pt scheduled for regular cysto on 8/28- pt needs scheduled for botox. I have called and left vm with pt to call back to get rescheduled.

## 2024-08-22 ENCOUNTER — TELEPHONE (OUTPATIENT)
Dept: UROLOGY | Facility: CLINIC | Age: 84
End: 2024-08-22
Payer: MEDICARE

## 2024-08-29 PROCEDURE — 93294 REM INTERROG EVL PM/LDLS PM: CPT | Performed by: NURSE PRACTITIONER

## 2024-08-29 PROCEDURE — 93296 REM INTERROG EVL PM/IDS: CPT | Performed by: NURSE PRACTITIONER

## 2024-09-03 DIAGNOSIS — I49.5 SA NODE DYSFUNCTION (HCC): ICD-10-CM

## 2024-09-03 DIAGNOSIS — Z95.0 PACEMAKER: Primary | ICD-10-CM

## 2024-10-17 ENCOUNTER — OFFICE VISIT (OUTPATIENT)
Dept: INTERNAL MEDICINE | Facility: CLINIC | Age: 84
End: 2024-10-17
Payer: MEDICARE

## 2024-10-17 ENCOUNTER — HOSPITAL ENCOUNTER (OUTPATIENT)
Dept: GENERAL RADIOLOGY | Facility: HOSPITAL | Age: 84
Discharge: HOME | End: 2024-10-17
Admitting: INTERNAL MEDICINE
Payer: MEDICARE

## 2024-10-17 VITALS
WEIGHT: 223.4 LBS | HEIGHT: 72 IN | RESPIRATION RATE: 16 BRPM | HEART RATE: 60 BPM | BODY MASS INDEX: 30.26 KG/M2 | SYSTOLIC BLOOD PRESSURE: 133 MMHG | DIASTOLIC BLOOD PRESSURE: 64 MMHG | OXYGEN SATURATION: 97 %

## 2024-10-17 DIAGNOSIS — M25.511 CHRONIC RIGHT SHOULDER PAIN: ICD-10-CM

## 2024-10-17 DIAGNOSIS — G47.33 OSA TREATED WITH BIPAP: ICD-10-CM

## 2024-10-17 DIAGNOSIS — E11.22 TYPE 2 DIABETES MELLITUS WITH STAGE 4 CHRONIC KIDNEY DISEASE, WITHOUT LONG-TERM CURRENT USE OF INSULIN: ICD-10-CM

## 2024-10-17 DIAGNOSIS — G89.29 CHRONIC RIGHT SHOULDER PAIN: ICD-10-CM

## 2024-10-17 DIAGNOSIS — E66.811 CLASS 1 OBESITY DUE TO EXCESS CALORIES WITH SERIOUS COMORBIDITY AND BODY MASS INDEX (BMI) OF 30.0 TO 30.9 IN ADULT: ICD-10-CM

## 2024-10-17 DIAGNOSIS — E66.09 CLASS 1 OBESITY DUE TO EXCESS CALORIES WITH SERIOUS COMORBIDITY AND BODY MASS INDEX (BMI) OF 30.0 TO 30.9 IN ADULT: ICD-10-CM

## 2024-10-17 DIAGNOSIS — L30.9 DERMATITIS: Primary | ICD-10-CM

## 2024-10-17 DIAGNOSIS — I65.23 BILATERAL CAROTID ARTERY STENOSIS: ICD-10-CM

## 2024-10-17 DIAGNOSIS — I50.32 CHRONIC HEART FAILURE WITH PRESERVED EJECTION FRACTION: ICD-10-CM

## 2024-10-17 DIAGNOSIS — N18.4 TYPE 2 DIABETES MELLITUS WITH STAGE 4 CHRONIC KIDNEY DISEASE, WITHOUT LONG-TERM CURRENT USE OF INSULIN: ICD-10-CM

## 2024-10-17 DIAGNOSIS — Z23 NEED FOR VACCINATION: ICD-10-CM

## 2024-10-17 DIAGNOSIS — M19.011 OSTEOARTHRITIS OF GLENOHUMERAL JOINT, RIGHT: ICD-10-CM

## 2024-10-17 PROCEDURE — 1160F RVW MEDS BY RX/DR IN RCRD: CPT | Performed by: INTERNAL MEDICINE

## 2024-10-17 PROCEDURE — 1159F MED LIST DOCD IN RCRD: CPT | Performed by: INTERNAL MEDICINE

## 2024-10-17 PROCEDURE — G0438 PPPS, INITIAL VISIT: HCPCS | Performed by: INTERNAL MEDICINE

## 2024-10-17 PROCEDURE — 73030 X-RAY EXAM OF SHOULDER: CPT

## 2024-10-17 PROCEDURE — G0008 ADMIN INFLUENZA VIRUS VAC: HCPCS | Performed by: INTERNAL MEDICINE

## 2024-10-17 PROCEDURE — 99214 OFFICE O/P EST MOD 30 MIN: CPT | Performed by: INTERNAL MEDICINE

## 2024-10-17 PROCEDURE — 90662 IIV NO PRSV INCREASED AG IM: CPT | Performed by: INTERNAL MEDICINE

## 2024-10-17 RX ORDER — CLOBETASOL PROPIONATE 0.5 MG/G
1 CREAM TOPICAL 2 TIMES DAILY
Qty: 30 G | Refills: 0 | Status: SHIPPED | OUTPATIENT
Start: 2024-10-17

## 2024-10-17 RX ORDER — BUMETANIDE 1 MG/1
1 TABLET ORAL EVERY 12 HOURS SCHEDULED
Qty: 90 TABLET | Refills: 1 | Status: SHIPPED | OUTPATIENT
Start: 2024-10-17

## 2024-10-17 RX ORDER — ASPIRIN 81 MG/1
81 TABLET, CHEWABLE ORAL DAILY
COMMUNITY

## 2024-10-17 RX ORDER — AMIODARONE HYDROCHLORIDE 200 MG/1
200 TABLET ORAL DAILY
Qty: 30 TABLET | Refills: 3 | Status: CANCELLED | OUTPATIENT
Start: 2024-10-17

## 2024-10-17 NOTE — PROGRESS NOTES
Subjective   The ABCs of the Annual Wellness Visit  Medicare Wellness Visit      Zachariah Fournier is a 84 y.o. patient who presents for a Medicare Wellness Visit.    The following portions of the patient's history were reviewed and   updated as appropriate: allergies, current medications, past family history, past medical history, past social history, past surgical history, and problem list.    Compared to one year ago, the patient's physical   health is the same.  Compared to one year ago, the patient's mental   health is the same.    Recent Hospitalizations:  He was not admitted to the hospital during the last year.     Current Medical Providers:  Patient Care Team:  Mat Hankins DO as PCP - General (Internal Medicine)  Miguel Martins APRN as Nurse Practitioner (Family Medicine)  Jethro Suarez MD (Internal Medicine)  Hola Kwon MD (Internal Medicine)  Hoang Sanchez MD as Consulting Physician (Urology)  Andrew, Pablo Hedrick MD as Consulting Physician (Dermatology)    Outpatient Medications Prior to Visit   Medication Sig Dispense Refill    amiodarone (PACERONE) 200 MG tablet Take 1 tablet by mouth Daily. 30 tablet 3    atorvastatin (LIPITOR) 20 MG tablet Take 1 tablet by mouth Daily. 90 tablet 3    folic acid (FOLVITE) 1 MG tablet Take 1 tablet by mouth Daily. 30 tablet 3    metOLazone (ZAROXOLYN) 2.5 MG tablet Take 1 tablet by mouth Every Other Day.      metoprolol succinate XL (TOPROL-XL) 100 MG 24 hr tablet Take 1 tablet by mouth Daily.      Mirabegron ER (MYRBETRIQ) 25 MG tablet sustained-release 24 hour 24 hr tablet Take 1 tablet by mouth Daily.      NIFEdipine XL (PROCARDIA XL) 60 MG 24 hr tablet Take 1 tablet by mouth Daily.      Plecanatide 3 MG tablet Take 1 tablet by mouth Daily.      Polypodium Leucotomos (HELIOCARE PO)       Sennosides (SENNA LAXATIVE PO) Take 2 capsules by mouth Daily.      tamsulosin (FLOMAX) 0.4 MG capsule 24 hr capsule Take 1 capsule by mouth  Daily.      vitamin D (ERGOCALCIFEROL) 1.25 MG (41885 UT) capsule capsule Take 1 capsule by mouth 1 (One) Time Per Week.      bumetanide (BUMEX) 1 MG tablet Take 1 tablet by mouth Every 12 (Twelve) Hours. 90 tablet 1    aspirin 81 MG chewable tablet Chew 1 tablet Daily.       No facility-administered medications prior to visit.     No opioid medication identified on active medication list. I have reviewed chart for other potential  high risk medication/s and harmful drug interactions in the elderly.      Aspirin is on active medication list. Aspirin use is indicated based on review of current medical condition/s. Pros and cons of this therapy have been discussed today. Benefits of this medication outweigh potential harm.  Patient has been encouraged to continue taking this medication.  .      Patient Active Problem List   Diagnosis    Mild aortic stenosis by prior echocardiography    Mild mitral regurgitation by prior echocardiography    Chronic heart failure with preserved ejection fraction    Atherosclerosis of native artery of both lower extremities with intermittent claudication    Bilateral carotid artery stenosis    History of bladder cancer    Stage 4 chronic kidney disease    Class 1 obesity due to excess calories with serious comorbidity and body mass index (BMI) of 30.0 to 30.9 in adult    COPD (chronic obstructive pulmonary disease)    Mixed hyperlipidemia    Lumbar spinal stenosis    FINA treated with BiPAP    Type 2 diabetes mellitus with stage 4 chronic kidney disease, without long-term current use of insulin    Urge incontinence of urine    Vitamin D deficiency    Nocturnal hypoxemia    Chronic bilateral low back pain without sciatica    Neuropathy due to secondary diabetes    Squamous cell carcinoma of skin     Advance Care Planning Advance Directive is not on file.  ACP discussion was held with the patient during this visit. Patient has an advance directive (not in EMR), copy requested.   "          Objective   Vitals:    10/17/24 1302   BP: 133/64   BP Location: Right arm   Patient Position: Sitting   Cuff Size: Adult   Pulse: 60   Resp: 16   SpO2: 97%   Weight: 101 kg (223 lb 6.4 oz)   Height: 182.9 cm (72\")       Estimated body mass index is 30.3 kg/m² as calculated from the following:    Height as of this encounter: 182.9 cm (72\").    Weight as of this encounter: 101 kg (223 lb 6.4 oz).            Does the patient have evidence of cognitive impairment? No                                                                                                Health  Risk Assessment    Smoking Status:  Social History     Tobacco Use   Smoking Status Former    Current packs/day: 0.00    Types: Cigarettes    Quit date:     Years since quittin.8    Passive exposure: Never   Smokeless Tobacco Never     Alcohol Consumption:  Social History     Substance and Sexual Activity   Alcohol Use Yes    Comment: 6 drinks a week       Fall Risk Screen  STEADI Fall Risk Assessment was completed, and patient is at HIGH risk for falls. Assessment completed on:2024    Depression Screening:      10/17/2024     1:11 PM   PHQ-2/PHQ-9 Depression Screening   Little interest or pleasure in doing things Not at all   Feeling down, depressed, or hopeless Not at all     Health Habits and Functional and Cognitive Screening:      10/17/2024     1:00 PM   Functional & Cognitive Status   Do you have difficulty preparing food and eating? No   Do you have difficulty bathing yourself, getting dressed or grooming yourself? No   Do you have difficulty using the toilet? No   Do you have difficulty moving around from place to place? No   Do you have trouble with steps or getting out of a bed or a chair? No   Current Diet Unhealthy Diet   Dental Exam Up to date   Eye Exam Up to date   Exercise (times per week) 0 times per week   Current Exercises Include No Regular Exercise   Do you need help using the phone?  No   Are you deaf or do " you have serious difficulty hearing?  Yes   Do you need help to go to places out of walking distance? Yes   Do you need help shopping? No   Do you need help preparing meals?  No   Do you need help with housework?  No   Do you need help with laundry? No   Do you need help taking your medications? No   Do you need help managing money? No   Do you ever drive or ride in a car without wearing a seat belt? No   Have you felt unusual stress, anger or loneliness in the last month? No   Who do you live with? Spouse   If you need help, do you have trouble finding someone available to you? No   Have you been bothered in the last four weeks by sexual problems? No   Do you have difficulty concentrating, remembering or making decisions? No           Age-appropriate Screening Schedule:  Refer to the list below for future screening recommendations based on patient's age, sex and/or medical conditions. Orders for these recommended tests are listed in the plan section. The patient has been provided with a written plan.    Health Maintenance List  Health Maintenance   Topic Date Due    URINE MICROALBUMIN  Never done    ZOSTER VACCINE (1 of 2) Never done    RSV Vaccine - Adults (1 - 1-dose 75+ series) Never done    Pneumococcal Vaccine 65+ (2 of 2 - PPSV23 or PCV20) 07/08/2017    ANNUAL WELLNESS VISIT  Never done    DIABETIC EYE EXAM  07/19/2024    COVID-19 Vaccine (5 - 2023-24 season) 09/01/2024    HEMOGLOBIN A1C  12/13/2024    BMI FOLLOWUP  06/04/2025    LIPID PANEL  06/13/2025    TDAP/TD VACCINES (3 - Td or Tdap) 08/25/2031    INFLUENZA VACCINE  Completed                                                                                                                                                CMS Preventative Services Quick Reference  Risk Factors Identified During Encounter  Fall Risk-High or Moderate: Discussed Fall Prevention in the home  Immunizations Discussed/Encouraged: Tdap, Influenza, Prevnar 20 (Pneumococcal 20-valent  conjugate), Shingrix, COVID19, and RSV (Respiratory Syncytial Virus)  Dental Screening Recommended  Vision Screening Recommended    The above risks/problems have been discussed with the patient.  Pertinent information has been shared with the patient in the After Visit Summary.  An After Visit Summary and PPPS were made available to the patient.    Follow Up:   Next Medicare Wellness visit to be scheduled in 1 year.         Additional E&M Note during same encounter follows:  Patient has additional, significant, and separately identifiable condition(s)/problem(s) that require work above and beyond the Medicare Wellness Visit     Chief Complaint  right shoulder pain    Subjective    HPI  Festus is also being seen today for additional medical problem/s.       The patient presents for evaluation of multiple medical concerns.    Approximately 2 to 3 months ago, he sustained an injury to his left arm after falling while lifting a heavy box. The incident resulted in a skin tear, the scab of which was subsequently removed with scissors. The wound oozed a reddish liquid for about 1 to 2 weeks before drying up. However, he continues to experience persistent itching and stinging sensations in the area.    He has been experiencing right shoulder pain, which has escalated to the point where it is difficult for him to lift his arm. The pain is severe and is accompanied by numbness in his hand and fingers.    He also reports back pain that radiates to his legs, limiting his mobility to about 100 yards before he needs to rest. He has discontinued his daily aspirin regimen due to bruising from scratching.    His last eye doctor visit was about a year ago. He uses a CPAP machine with oxygen at night, as recommended by his pulmonologist for therapeutic purposes.    He had a cochlear implant put in for his hearing 4 to 5 months ago. His hearing is much improved with it. He has not been admitted to the hospital.  Review of Systems  "  Musculoskeletal:  Positive for arthralgias.   Skin:  Positive for rash.          Objective   Vital Signs:  /64 (BP Location: Right arm, Patient Position: Sitting, Cuff Size: Adult)   Pulse 60   Resp 16   Ht 182.9 cm (72\")   Wt 101 kg (223 lb 6.4 oz)   SpO2 97%   BMI 30.30 kg/m²   Physical Exam  Musculoskeletal:      Comments: TTP overlying AC joint with positive neer's on the right. Negative Garcia and yergasons.   Skin:     Comments: Bandlike area of irritation with thickening of the skin on the left upper arm. There is scaling without erythema.                         Assessment and Plan      Diagnoses and all orders for this visit:    1. Dermatitis (Primary)  -     clobetasol propionate (Temovate) 0.05 % cream; Apply 1 Application topically to the appropriate area as directed 2 (Two) Times a Day.  Dispense: 30 g; Refill: 0    2. Chronic right shoulder pain  -     XR Shoulder 2+ View Right; Future  -     Ambulatory Referral to Sports Medicine    3. Osteoarthritis of glenohumeral joint, right  -     Ambulatory Referral to Sports Medicine    4. Chronic heart failure with preserved ejection fraction  -     bumetanide (BUMEX) 1 MG tablet; Take 1 tablet by mouth Every 12 (Twelve) Hours.  Dispense: 90 tablet; Refill: 1    5. Type 2 diabetes mellitus with stage 4 chronic kidney disease, without long-term current use of insulin  Will continue to monitor with serial A1c.     6. Class 1 obesity due to excess calories with serious comorbidity and body mass index (BMI) of 30.0 to 30.9 in adult    7. Bilateral carotid artery stenosis    8. FINA treated with BiPAP    9. Need for vaccination  -     Fluzone High-Dose 65+yrs ([id number])           1. Arm skin lesion.  The lesion on his arm appears to be a result of skin thickening, resembling scar tissue. A potent steroid cream will be prescribed for application on the affected area. He is advised to wash his hands post-application and avoid contact with his face or " eyes.    2. Shoulder pain.  The pain could be indicative of arthritis or bursitis. An x-ray of the shoulder will be ordered. A consultation with sports medicine will be arranged to discuss potential injection therapy. He is advised to resume his aspirin regimen for cardiovascular protection. An influenza vaccine will be administered today, and a pneumonia vaccine will be scheduled for his next visit.    Follow-up  A follow-up visit is scheduled in 6 months.    Orders Placed This Encounter   Procedures    XR Shoulder 2+ View Right     Standing Status:   Future     Number of Occurrences:   1     Standing Expiration Date:   10/17/2025     Order Specific Question:   Reason for Exam:     Answer:   chronic shoulder pain     Order Specific Question:   Release to patient     Answer:   Routine Release [[id number]]    Fluzone High-Dose 65+yrs ([id number])    Ambulatory Referral to Sports Medicine     Referral Priority:   Routine     Referral Type:   Consultation     Referral Reason:   Specialty Services Required     Requested Specialty:   Sports Medicine     Number of Visits Requested:   1    POCT glycated hemoglobin, total     Order Specific Question:   Release to patient     Answer:   Routine Release [[id number]]     New Medications Ordered This Visit   Medications    bumetanide (BUMEX) 1 MG tablet     Sig: Take 1 tablet by mouth Every 12 (Twelve) Hours.     Dispense:  90 tablet     Refill:  1    clobetasol propionate (Temovate) 0.05 % cream     Sig: Apply 1 Application topically to the appropriate area as directed 2 (Two) Times a Day.     Dispense:  30 g     Refill:  0          Follow Up   Return in about 6 months (around 4/17/2025) for Recheck.  Patient was given instructions and counseling regarding his condition or for health maintenance advice. Please see specific information pulled into the AVS if appropriate.  Patient or patient representative verbalized consent for the use of Ambient Listening during the visit with   Mat Hankins DO for chart documentation. 10/21/2024  22:17 CDT

## 2024-10-17 NOTE — PATIENT INSTRUCTIONS
Medicare Wellness  Personal Prevention Plan of Service     Date of Office Visit:    Encounter Provider:  Mat Hankins DO  Place of Service:  [facility] INTERNAL MEDICINE  Patient Name: Zachariah Fournier  :  1940    As part of the Medicare Wellness portion of your visit today, we are providing you with this personalized preventive plan of services (PPPS). This plan is based upon recommendations of the [place] Preventive Services Task Force (USPSTF) and the Advisory Committee on Immunization Practices (ACIP).    This lists the preventive care services that should be considered, and provides dates of when you are due. Items listed as completed are up-to-date and do not require any further intervention.    Health Maintenance   Topic Date Due    URINE MICROALBUMIN  Never done    ZOSTER VACCINE (1 of 2) Never done    RSV Vaccine - Adults (1 - 1-dose 75+ series) Never done    Pneumococcal Vaccine 65+ (2 of 2 - PPSV23 or PCV20) 2017    ANNUAL WELLNESS VISIT  Never done    DIABETIC EYE EXAM  2024    INFLUENZA VACCINE  2024    COVID-19 Vaccine ( - - season) 2024    HEMOGLOBIN A1C  2024    BMI FOLLOWUP  2025    LIPID PANEL  2025    TDAP/TD VACCINES (3 - Td or Tdap) 2031       Orders Placed This Encounter   Procedures    Fluzone High-Dose 65+yrs (3826-5895)    POCT glycated hemoglobin, total     Order Specific Question:   Release to patient     Answer:   Routine Release [[id number]]       Return in about 6 months (around 2025) for Recheck.

## 2024-10-28 ENCOUNTER — OFFICE VISIT (OUTPATIENT)
Age: 84
End: 2024-10-28
Payer: MEDICARE

## 2024-10-28 VITALS — HEIGHT: 72 IN | RESPIRATION RATE: 18 BRPM | BODY MASS INDEX: 30.2 KG/M2 | WEIGHT: 223 LBS

## 2024-10-28 DIAGNOSIS — M12.811 ROTATOR CUFF ARTHROPATHY OF RIGHT SHOULDER: ICD-10-CM

## 2024-10-28 DIAGNOSIS — M19.011 ARTHRITIS OF RIGHT GLENOHUMERAL JOINT: Primary | ICD-10-CM

## 2024-10-28 PROCEDURE — 20610 DRAIN/INJ JOINT/BURSA W/O US: CPT | Performed by: STUDENT IN AN ORGANIZED HEALTH CARE EDUCATION/TRAINING PROGRAM

## 2024-10-28 PROCEDURE — 99204 OFFICE O/P NEW MOD 45 MIN: CPT | Performed by: STUDENT IN AN ORGANIZED HEALTH CARE EDUCATION/TRAINING PROGRAM

## 2024-10-28 RX ORDER — TRIAMCINOLONE ACETONIDE 40 MG/ML
40 INJECTION, SUSPENSION INTRA-ARTICULAR; INTRAMUSCULAR ONCE
Status: COMPLETED | OUTPATIENT
Start: 2024-10-28 | End: 2024-10-28

## 2024-10-28 RX ORDER — TRIAMCINOLONE ACETONIDE 40 MG/ML
40 INJECTION, SUSPENSION INTRA-ARTICULAR; INTRAMUSCULAR ONCE
Status: DISCONTINUED | OUTPATIENT
Start: 2024-10-28 | End: 2024-10-28

## 2024-10-28 RX ORDER — LIDOCAINE HYDROCHLORIDE 10 MG/ML
2 INJECTION, SOLUTION INFILTRATION; PERINEURAL ONCE
Status: COMPLETED | OUTPATIENT
Start: 2024-10-28 | End: 2024-10-28

## 2024-10-28 RX ADMIN — TRIAMCINOLONE ACETONIDE 40 MG: 40 INJECTION, SUSPENSION INTRA-ARTICULAR; INTRAMUSCULAR at 14:36

## 2024-10-28 RX ADMIN — LIDOCAINE HYDROCHLORIDE 2 ML: 10 INJECTION, SOLUTION INFILTRATION; PERINEURAL at 14:35

## 2024-10-28 NOTE — PROGRESS NOTES
[facility] Orthopedics & Sports Medicine  Den Naylor MD, PhD  Antonio Naylor PA-C    CHIEF COMPLAINT  Initial Evaluation of the Right Shoulder (Patient presents today for right shoulder pain for 20 years but has worsen in the past year. X-rays performed at [facility] on 10/17/2024. No injury. Pain radiators from shoulder to elbow.  )       HISTORY OF PRESENT ILLNESS    New patient here referred by his PCP for right shoulder pain.  Pain has worsened and it is difficult for him to lift his arm.  Pain is severe and accompanied by some numbness and tingling in the hands and fingers at times.  He had x-rays done previously.  He has never had an injection.  He says that he also has a bad back and spinal stenosis but had a surgery years ago and never improved much.  He was told many years ago after an MRI that he had a rotator cuff tear that would probably get worse but he says that he did really well after that and did not have any issues with it for quite some time.  In the last year and a half he built a workbench at his house and that seemed to exacerbate things and since then he has had a very difficult time raising it overhead and has near constant pain in it.  He occasionally has some pain shoots down his arm.    HISTORY  Lived on his sailboat for 4 years    Current Outpatient Medications   Medication Instructions    amiodarone (PACERONE) 200 mg, Oral, Daily    aspirin 81 mg, Daily    atorvastatin (LIPITOR) 20 mg, Oral, Daily    bumetanide (BUMEX) 1 mg, Oral, Every 12 Hours Scheduled    clobetasol propionate (Temovate) 0.05 % cream 1 Application, Topical, 2 Times Daily    folic acid (FOLVITE) 1,000 mcg, Oral, Daily    metOLazone (ZAROXOLYN) 2.5 mg, Every Other Day    metoprolol succinate XL (TOPROL-XL) 100 mg, Daily    Mirabegron ER (MYRBETRIQ) 25 mg, Daily    NIFEdipine XL (PROCARDIA XL) 60 MG 24 hr tablet 1 tablet, Daily    Plecanatide 3 mg, Daily    Polypodium Leucotomos (HELIOCARE PO) No dose, route,  or frequency recorded.    Sennosides (SENNA LAXATIVE PO) 2 capsules, Daily    tamsulosin (FLOMAX) 0.4 MG capsule 24 hr capsule 1 capsule, Daily    vitamin D (ERGOCALCIFEROL) 1.25 MG (90055 UT) capsule capsule 1 capsule, Weekly         reports that he quit smoking about 24 years ago. His smoking use included cigarettes. He has never been exposed to tobacco smoke. He has never used smokeless tobacco. He reports current alcohol use. He reports that he does not use drugs.    Past Medical History:   Diagnosis Date    Cancer     Diabetes mellitus     HL (hearing loss)     Hypertension     Kidney disease         Past Surgical History:   Procedure Laterality Date    BACK SURGERY      2005    EAR MASTOIDECTOMY W/ COCHLEAR IMPLANT W/ LANDMARK Left 06/2024    PACEMAKER IMPLANTATION  2022        PHYSICAL EXAM  Constitutional: The patient is in no apparent distress and generally well-appearing. The patient hears me clearly and answers questions appropriately.   Musculoskeletal:  Right shoulder  Decreased range of motion with forward flexion 0-70 and abduction 0-60  Crepitus in the shoulder with range of motion  Tender to palpation in the subacromial area, bicipital groove    IMAGING    XR Shoulder 2+ View Right    Result Date: 10/17/2024  Narrative: EXAMINATION: XR SHOULDER 2+ VW RIGHT- 10/17/2024 3:02 PM  HISTORY: chronic shoulder pain; M25.511-Pain in right shoulder; G89.29-Other chronic pain.  REPORT: 3 views of the right shoulder were obtained.  COMPARISON: There are no correlative imaging studies for comparison.  No fracture or dislocation is identified, there is moderate narrowing of the glenohumeral joint, superior subluxation of the humeral head relative to the glenoid process, with marked narrowing of the subacromial interval. This can be associated with rotator cuff pathology. There is mild spurring of the greater tuberosity of the humeral head. The soft tissues are within normal limits.      Impression: Moderate  osteoarthritis of the glenohumeral joint, marked narrowing of the subacromial interval concerning for chronic rotator cuff pathology. No acute fracture is identified.  This report was signed and finalized on 10/17/2024 3:04 PM by Dr. Buddy Dickey MD.      Previous shoulder x-rays personally reviewed.  There are severe degenerative changes with superior migration of the humeral head suggestive of rotator cuff arthropathy, with spurring of the humeral head and loss of glenohumeral joint space.  No obvious acute osseous abnormalities.    ASSESSMENT & PLAN  Diagnoses and all orders for this visit:    1. Arthritis of right glenohumeral joint (Primary)    2. Rotator cuff arthropathy of right shoulder  -     lidocaine (XYLOCAINE) 1 % injection 2 mL  -     Discontinue: triamcinolone acetonide (KENALOG-40) injection 40 mg  -     triamcinolone acetonide (KENALOG-40) injection 40 mg    The patient has fairly severe degenerative changes in his right glenohumeral joint, likely the sequela of an old rotator cuff tear.  He has very poor range of motion in his shoulder as well.  I discussed with him short of a total shoulder replacement it would be difficult to get full range of motion, but I did think that we could do some things to help with his pain.  He would be a high risk surgical candidate at his age and with his medical comorbidities and is really not interested in surgery if he can avoid it.  He has never had an injection of any kind in his shoulder so we started with a subacromial corticosteroid injection today.  I did discuss with him that depending on his response to this a glenohumeral injection could also be considered, which I would do with ultrasound guidance.  However with his rotator cuff tear and subacromial injection he will likely also treat the glenohumeral joint.  I will see him back in about 4 weeks to see how he is doing and we could also consider physical therapy, although it may need to be home PT if  "we go that route.    Right subacromial injection today     Shoulder Injection Procedure Note    Right shoulder injection was discussed with the patient in detail, including indication, risks, benefits, and alternatives. Risks include but are not limited to: incomplete symptom resolution, injection site pain, local irritation, bleeding, infection, allergic reaction, elevated blood pressure and blood sugar. Verbal consent was given for the procedure.  Injection site was identified by physical examination and cleaned with Chloraprep and alcohol swabs. Prior to needle insertion, ethyl chloride spray was used for surface anesthesia.  A 22-gauge, 1.5\" needle was directed to the joint from a(n) posterior subacromial approach. Injectate was passed into the shoulder without difficulty. The needle was removed and a simple bandage was applied. The procedure was tolerated well without difficulty.    Injection mixture:  1% plain lidocaine: 2 mL  40 mg/mL triamcinolone acetonide: 1 mL        FOLLOW-UP  Return in about 4 weeks (around 11/25/2024).    Den Naylor MD, PhD  "

## 2024-11-25 ENCOUNTER — OFFICE VISIT (OUTPATIENT)
Age: 84
End: 2024-11-25
Payer: MEDICARE

## 2024-11-25 VITALS — BODY MASS INDEX: 30.2 KG/M2 | WEIGHT: 223 LBS | HEIGHT: 72 IN

## 2024-11-25 DIAGNOSIS — M12.811 ROTATOR CUFF ARTHROPATHY OF RIGHT SHOULDER: ICD-10-CM

## 2024-11-25 DIAGNOSIS — M19.011 ARTHRITIS OF RIGHT GLENOHUMERAL JOINT: Primary | ICD-10-CM

## 2024-11-25 RX ORDER — LIDOCAINE HYDROCHLORIDE 10 MG/ML
2 INJECTION, SOLUTION INFILTRATION; PERINEURAL ONCE
Status: COMPLETED | OUTPATIENT
Start: 2024-11-25 | End: 2024-11-25

## 2024-11-25 RX ORDER — TRIAMCINOLONE ACETONIDE 40 MG/ML
40 INJECTION, SUSPENSION INTRA-ARTICULAR; INTRAMUSCULAR ONCE
Status: COMPLETED | OUTPATIENT
Start: 2024-11-25 | End: 2024-11-25

## 2024-11-25 RX ORDER — GLIPIZIDE 2.5 MG/1
2.5 TABLET, EXTENDED RELEASE ORAL DAILY
COMMUNITY

## 2024-11-25 RX ADMIN — LIDOCAINE HYDROCHLORIDE 2 ML: 10 INJECTION, SOLUTION INFILTRATION; PERINEURAL at 14:38

## 2024-11-25 RX ADMIN — TRIAMCINOLONE ACETONIDE 40 MG: 40 INJECTION, SUSPENSION INTRA-ARTICULAR; INTRAMUSCULAR at 14:39

## 2024-11-25 NOTE — PROGRESS NOTES
[facility] Orthopedics & Sports Medicine  Den Naylor MD, PhD  Antonio Naylor PA-C    CHIEF COMPLAINT  Follow-up of the Right Shoulder (Patient presents today for right shoulder injection follow up. Patient states his pain is still persistent. )       HISTORY OF PRESENT ILLNESS  Here to f/u on right shoulder pain. Had an injection last visit ~1 month ago.   History of Present Illness  The patient presents for follow-up on his shoulder.    He reports minimal improvement following the injection, with the effects only lasting a few days. His shoulder mobility remains significantly restricted, and he experiences pain during movement.       HISTORY    Current Outpatient Medications   Medication Instructions    amiodarone (PACERONE) 200 mg, Oral, Daily    aspirin 81 mg, Daily    atorvastatin (LIPITOR) 20 mg, Oral, Daily    bumetanide (BUMEX) 1 mg, Oral, Every 12 Hours Scheduled    clobetasol propionate (Temovate) 0.05 % cream 1 Application, Topical, 2 Times Daily    folic acid (FOLVITE) 1,000 mcg, Oral, Daily    glipizide (GLUCOTROL XL) 2.5 mg, Daily    metOLazone (ZAROXOLYN) 2.5 mg, Every Other Day    metoprolol succinate XL (TOPROL-XL) 100 mg, Daily    Mirabegron ER (MYRBETRIQ) 25 mg, Daily    NIFEdipine XL (PROCARDIA XL) 60 MG 24 hr tablet 1 tablet, Daily    Plecanatide 3 mg, Daily    Polypodium Leucotomos (HELIOCARE PO) No dose, route, or frequency recorded.    Sennosides (SENNA LAXATIVE PO) 2 capsules, Daily    tamsulosin (FLOMAX) 0.4 MG capsule 24 hr capsule 1 capsule, Daily    vitamin D (ERGOCALCIFEROL) 1.25 MG (96884 UT) capsule capsule 1 capsule, Weekly         reports that he quit smoking about 24 years ago. His smoking use included cigarettes. He has never been exposed to tobacco smoke. He has never used smokeless tobacco. He reports current alcohol use. He reports that he does not use drugs.    Past Medical History:   Diagnosis Date    Cancer     Diabetes mellitus     HL (hearing loss)      Hypertension     Kidney disease         Past Surgical History:   Procedure Laterality Date    BACK SURGERY      2005    EAR MASTOIDECTOMY W/ COCHLEAR IMPLANT W/ LANDMARK Left 06/2024    PACEMAKER IMPLANTATION  2022        PHYSICAL EXAM  Constitutional: The patient is in no apparent distress and generally well-appearing. The patient hears me clearly and answers questions appropriately.   Musculoskeletal:  Physical Exam  Musculoskeletal system shows forward flexion and abduction to about 80 degrees.  Crepitus present.  Decreased strength with shoulder abduction.  Nontender to palpation throughout the shoulder.      IMAGING    No results found.   Narrative & Impression   EXAMINATION: XR SHOULDER 2+ VW RIGHT- 10/17/2024 3:02 PM     HISTORY: chronic shoulder pain; M25.511-Pain in right shoulder;  G89.29-Other chronic pain.     REPORT: 3 views of the right shoulder were obtained.     COMPARISON: There are no correlative imaging studies for comparison.     No fracture or dislocation is identified, there is moderate narrowing of  the glenohumeral joint, superior subluxation of the humeral head  relative to the glenoid process, with marked narrowing of the  subacromial interval. This can be associated with rotator cuff  pathology. There is mild spurring of the greater tuberosity of the  humeral head. The soft tissues are within normal limits.     IMPRESSION:  Moderate osteoarthritis of the glenohumeral joint, marked  narrowing of the subacromial interval concerning for chronic rotator  cuff pathology. No acute fracture is identified.     This report was signed and finalized on 10/17/2024 3:04 PM by Dr. Buddy Dickey MD.        Results         ASSESSMENT & PLAN  Diagnoses and all orders for this visit:    1. Arthritis of right glenohumeral joint (Primary)  -     lidocaine (XYLOCAINE) 1 % injection 2 mL  -     triamcinolone acetonide (KENALOG-40) injection 40 mg  -     lidocaine (XYLOCAINE) 1 % injection 2 mL    2. Rotator  cuff arthropathy of right shoulder         Assessment & Plan  1. Shoulder arthritis.  The patient's shoulder arthritis is causing significant discomfort and limited mobility. He reported minimal improvement after the initial injection, with some relief occurring a couple of days post-injection but not lasting long. Forward flexion and abduction are limited to about 80 degrees, and the pain is exacerbated by movement, not by touch. An ultrasound-guided injection was administered today, targeting the joint directly to potentially provide better relief. Shoulder replacement surgery was discussed but not preferred by the patient at this time. Pain management with medications was also discussed as an option.    2. Rotator cuff tear.  The patient has a chronic rotator cuff tear. The tear is longstanding, and the head of the bone has drifted upwards due to the tear. An ultrasound-guided injection was administered today to address the pain and inflammation associated with the tear.    I discussed with the patient that he has a chronic longstanding rotator cuff tear but also has arthritis in his shoulder and the only surgical intervention that would be recommended would be a shoulder replacement surgery.  However he is not interested in undergoing a major operation.  I did discuss with him that outside of injections, physical therapy, and medications, I did not have a significant amount more to offer.      Ultrasound-guided glenohumeral joint injection        Procedure Note:   right glenohumeral joint corticosteroid injection with ultrasound guidance   Performed by Den Naylor MD   Indication: glenohumeral arthritis     Focused ultrasound evaluation performed of the posterior shoulder, and images in multiple planes through the specific area of concern were labeled and saved to local ultrasound storage. [employer] e ultrasound with curvilinear probe was used. Infraspinatus and teres minor tendons appear intact. No large  "posterior osteophyte or cysts. Images were labeled and saved and representative image was uploaded to patient's chart. After obtaining appropriate consent and using aseptic technique, a chloraprep wipe was used to cleanse the area of injection. Using an in-plane approach with infraspinatus visualized in long axis, a 22 gauge 1.5\" needle containing 2mL 1% lidocaine w/o epinephrine was used to anesthetize the skin and soft tissues overlying the posterior joint. Then once the needle was appropriately positioned syringe transfer was performed and while under direct ultrasound visualization, I injected 1mL of 40mg/ml kenalog with 2 mL 1% lidocaine  without complication. The needle was then safely withdrawn and a bandage applied. The patient tolerated the procedure well and was given post injection instructions as well as return precautions.       FOLLOW-UP  Return if symptoms worsen or fail to improve.    Patient or patient representative verbalized consent for the use of Ambient Listening during the visit with  Den Naylor MD for chart documentation. 11/25/2024  15:29 CST    Den Naylor MD, PhD  "

## 2024-12-02 ENCOUNTER — OFFICE VISIT (OUTPATIENT)
Dept: CARDIOLOGY CLINIC | Age: 84
End: 2024-12-02

## 2024-12-02 ENCOUNTER — OFFICE VISIT (OUTPATIENT)
Dept: PULMONOLOGY | Age: 84
End: 2024-12-02
Payer: MEDICARE

## 2024-12-02 ENCOUNTER — HOSPITAL ENCOUNTER (OUTPATIENT)
Dept: GENERAL RADIOLOGY | Age: 84
Discharge: HOME | End: 2024-12-02
Payer: MEDICARE

## 2024-12-02 VITALS
HEIGHT: 72 IN | SYSTOLIC BLOOD PRESSURE: 128 MMHG | HEART RATE: 60 BPM | WEIGHT: 221 LBS | BODY MASS INDEX: 29.93 KG/M2 | DIASTOLIC BLOOD PRESSURE: 70 MMHG

## 2024-12-02 VITALS — RESPIRATION RATE: 20 BRPM | BODY MASS INDEX: 29.8 KG/M2 | HEIGHT: 72 IN | WEIGHT: 220 LBS

## 2024-12-02 DIAGNOSIS — Z95.0 PACEMAKER: ICD-10-CM

## 2024-12-02 DIAGNOSIS — J96.11 CHRONIC RESPIRATORY FAILURE WITH HYPOXIA AND HYPERCAPNIA: ICD-10-CM

## 2024-12-02 DIAGNOSIS — I25.10 CORONARY ARTERY DISEASE INVOLVING NATIVE CORONARY ARTERY OF NATIVE HEART WITHOUT ANGINA PECTORIS: ICD-10-CM

## 2024-12-02 DIAGNOSIS — Z79.899 LONG TERM CURRENT USE OF ANTIARRHYTHMIC DRUG: ICD-10-CM

## 2024-12-02 DIAGNOSIS — R06.02 SOB (SHORTNESS OF BREATH): ICD-10-CM

## 2024-12-02 DIAGNOSIS — G47.33 OSA TREATED WITH BIPAP: Primary | ICD-10-CM

## 2024-12-02 DIAGNOSIS — I48.91 ATRIAL FIBRILLATION, UNSPECIFIED TYPE (HCC): ICD-10-CM

## 2024-12-02 DIAGNOSIS — I50.32 CHRONIC DIASTOLIC CONGESTIVE HEART FAILURE (HCC): ICD-10-CM

## 2024-12-02 DIAGNOSIS — I48.91 ATRIAL FIBRILLATION, UNSPECIFIED TYPE (HCC): Primary | ICD-10-CM

## 2024-12-02 DIAGNOSIS — J44.1 CHRONIC OBSTRUCTIVE PULMONARY DISEASE WITH (ACUTE) EXACERBATION (HCC): ICD-10-CM

## 2024-12-02 DIAGNOSIS — I49.5 SA NODE DYSFUNCTION (HCC): ICD-10-CM

## 2024-12-02 DIAGNOSIS — I10 ESSENTIAL HYPERTENSION: ICD-10-CM

## 2024-12-02 DIAGNOSIS — I47.29 NONSUSTAINED VENTRICULAR TACHYCARDIA (HCC): ICD-10-CM

## 2024-12-02 DIAGNOSIS — J96.12 CHRONIC RESPIRATORY FAILURE WITH HYPOXIA AND HYPERCAPNIA: ICD-10-CM

## 2024-12-02 LAB
ALBUMIN SERPL-MCNC: 4 G/DL (ref 3.5–5.2)
ALP SERPL-CCNC: 95 U/L (ref 40–129)
ALT SERPL-CCNC: 15 U/L (ref 5–41)
ANION GAP SERPL CALCULATED.3IONS-SCNC: 14 MMOL/L (ref 7–19)
AST SERPL-CCNC: 15 U/L (ref 5–40)
BILIRUB SERPL-MCNC: 0.4 MG/DL (ref 0.2–1.2)
BUN SERPL-MCNC: 59 MG/DL (ref 8–23)
CALCIUM SERPL-MCNC: 9.3 MG/DL (ref 8.8–10.2)
CHLORIDE SERPL-SCNC: 101 MMOL/L (ref 98–111)
CO2 SERPL-SCNC: 25 MMOL/L (ref 22–29)
CREAT SERPL-MCNC: 2.8 MG/DL (ref 0.7–1.2)
ERYTHROCYTE [DISTWIDTH] IN BLOOD BY AUTOMATED COUNT: 13.3 % (ref 11.5–14.5)
GLUCOSE SERPL-MCNC: 189 MG/DL (ref 70–99)
HCT VFR BLD AUTO: 42 % (ref 42–52)
HGB BLD-MCNC: 13.3 G/DL (ref 14–18)
MCH RBC QN AUTO: 30.2 PG (ref 27–31)
MCHC RBC AUTO-ENTMCNC: 31.7 G/DL (ref 33–37)
MCV RBC AUTO: 95.2 FL (ref 80–94)
PLATELET # BLD AUTO: 137 K/UL (ref 130–400)
PMV BLD AUTO: 9.8 FL (ref 9.4–12.4)
POTASSIUM SERPL-SCNC: 4.1 MMOL/L (ref 3.5–5)
PROT SERPL-MCNC: 6.9 G/DL (ref 6.4–8.3)
RBC # BLD AUTO: 4.41 M/UL (ref 4.7–6.1)
SODIUM SERPL-SCNC: 140 MMOL/L (ref 136–145)
T3FREE SERPL-MCNC: 1.8 PG/ML (ref 2–4.4)
T4 FREE SERPL-MCNC: 1.75 NG/DL (ref 0.93–1.7)
TSH SERPL DL<=0.005 MIU/L-ACNC: 1.51 UIU/ML (ref 0.27–4.2)
WBC # BLD AUTO: 9.6 K/UL (ref 4.8–10.8)

## 2024-12-02 PROCEDURE — 1159F MED LIST DOCD IN RCRD: CPT | Performed by: INTERNAL MEDICINE

## 2024-12-02 PROCEDURE — 1036F TOBACCO NON-USER: CPT | Performed by: INTERNAL MEDICINE

## 2024-12-02 PROCEDURE — G8417 CALC BMI ABV UP PARAM F/U: HCPCS | Performed by: INTERNAL MEDICINE

## 2024-12-02 PROCEDURE — G8427 DOCREV CUR MEDS BY ELIG CLIN: HCPCS | Performed by: INTERNAL MEDICINE

## 2024-12-02 PROCEDURE — 71046 X-RAY EXAM CHEST 2 VIEWS: CPT

## 2024-12-02 PROCEDURE — 1123F ACP DISCUSS/DSCN MKR DOCD: CPT | Performed by: INTERNAL MEDICINE

## 2024-12-02 PROCEDURE — 3023F SPIROM DOC REV: CPT | Performed by: INTERNAL MEDICINE

## 2024-12-02 PROCEDURE — 99214 OFFICE O/P EST MOD 30 MIN: CPT | Performed by: INTERNAL MEDICINE

## 2024-12-02 PROCEDURE — G8484 FLU IMMUNIZE NO ADMIN: HCPCS | Performed by: INTERNAL MEDICINE

## 2024-12-02 RX ORDER — NIACINAMIDE 500 MG
500 TABLET ORAL 2 TIMES DAILY
COMMUNITY
Start: 2024-09-12

## 2024-12-02 RX ORDER — HYDROCORTISONE 25 MG/ML
LOTION TOPICAL
COMMUNITY

## 2024-12-02 RX ORDER — CLOBETASOL PROPIONATE 0.5 MG/G
1 CREAM TOPICAL 2 TIMES DAILY
COMMUNITY
Start: 2024-10-17

## 2024-12-02 ASSESSMENT — ENCOUNTER SYMPTOMS
NAUSEA: 0
VOMITING: 0
ANAL BLEEDING: 0
EYES NEGATIVE: 1
COUGH: 0
WHEEZING: 0
SHORTNESS OF BREATH: 0
GASTROINTESTINAL NEGATIVE: 1
RHINORRHEA: 0
ABDOMINAL DISTENTION: 0
SHORTNESS OF BREATH: 0
BACK PAIN: 0
CHEST TIGHTNESS: 0
ABDOMINAL PAIN: 0
APNEA: 0
RESPIRATORY NEGATIVE: 1
DIARRHEA: 0

## 2024-12-02 NOTE — PROGRESS NOTES
Pulmonary and Sleep Medicine    Ashwin Valera (:  1940) is a 84 y.o. male,Established patient, here for evaluation of the following chief complaint(s):  Follow-up (6 month follow up for GREGORIO/DME report was uploaded on 10/29/2024//Pt states that he just got a new mask from [facility] and it has made a difference. He has no concerns or complaints at this time. //Per vital signs- See note)      Referring physician:  No referring provider defined for this encounter.     ASSESSMENT/PLAN:  1. GREGORIO treated with BiPAP  2. Chronic respiratory failure with hypoxia and hypercapnia (HCC). on oxygen and bipap  3. Chronic diastolic congestive heart failure (HCC)  4. SOB (shortness of breath)  5. Chronic obstructive pulmonary disease with (acute) exacerbation (HCC)        Continue current management with the BiPAP he is compliant with the BiPAP he feels that the BiPAP helps.  Continue current management for diastolic dysfunction.       Jet Holt MD, [facility], [facility]    No follow-ups on file.    SUBJECTIVE/OBJECTIVE:        Patient is here for follow-up on sleep apnea and chronic respiratory failure.  His BiPAP download was reviewed.  He is using the BiPAP he is compliant with the BiPAP he feels it helps his symptoms.  He has not required hospitalization in years.          Continue the following medications as reported by the patient:    Prior to Visit Medications    Medication Sig Taking? Authorizing Provider   hydrocortisone (HYTONE) 2.5 % lotion APPLY TOPICALLY TO THE AFFECTED AREA TWICE DAILY AS NEEDED Yes Loc Bo MD   clobetasol (TEMOVATE) 0.05 % cream Apply 1 Application topically 2 times daily Yes Loc Bo MD   niacinamide 500 MG tablet Take 1 tablet by mouth 2 times daily Yes Loc Bo MD   tamsulosin (FLOMAX) 0.4 MG capsule TAKE 1 CAPSULE BY MOUTH DAILY Yes Yuliya Davila APRN - CNP   amiodarone (CORDARONE) 200 MG tablet TAKE 1 TABLET BY MOUTH DAILY Yes Gamaliel

## 2024-12-02 NOTE — PROGRESS NOTES
Pacemaker interrogated  Presenting rhythm:  AP/VS, AP 95%,  82%  Battey voltage 6.1-6.5 years  Lead status:  Lead impedance within range and stable  Sensing:  P waves 3.0 mV,  R waves >12 mV  Thresholds:  Atrial 0.500 V @ 0.4ms, ventricular 0.62 V @ 0.4ms  Observations:  None  See scanned report for details  Reprogramming for sensitivity and threshold testing  Next [facility] appointment:  03/03/25

## 2024-12-02 NOTE — PROGRESS NOTES
[facility] Progress Note                            Date:  12/2/2024  Patient: Ashwin Valera  Age:  84 y.o., 1940      Reason for evaluation:         SUBJECTIVE:    Returns today follow-up assessment pacemaker paroxysmal atrial fibrillation antiarrhythmic drug usage with amiodarone sinus node dysfunction coronary artery disease hypertension overall doing well.  Denies anginal chest pain or limiting dyspnea denies palpitations.  EKG today shows atrial rhythm paced rhythm nonspecific ST and T wave abnormalities.  No other complaints or issues reported.  Blood pressure 128/70 heart 63    Review of Systems   Constitutional: Negative.  Negative for chills, fever and unexpected weight change.   HENT: Negative.     Eyes: Negative.    Respiratory: Negative.  Negative for shortness of breath.    Cardiovascular: Negative.  Negative for chest pain.   Gastrointestinal: Negative.  Negative for diarrhea, nausea and vomiting.   Endocrine: Negative.    Genitourinary: Negative.    Musculoskeletal: Negative.    Skin: Negative.    Neurological: Negative.    All other systems reviewed and are negative.        OBJECTIVE:    /70   Pulse 60   Ht 1.829 m (6')   Wt 100.2 kg (221 lb)   BMI 29.97 kg/m²     Labs:   CBC: No results for input(s): \"WBC\", \"HGB\", \"HCT\", \"PLT\" in the last 72 hours.  BMP:No results for input(s): \"NA\", \"K\", \"CO2\", \"BUN\", \"CREATININE\", \"LABGLOM\", \"GLUCOSE\" in the last 72 hours.  BNP: No results for input(s): \"BNP\" in the last 72 hours.  PT/INR: No results for input(s): \"PROTIME\", \"INR\" in the last 72 hours.  APTT:No results for input(s): \"APTT\" in the last 72 hours.  CARDIAC ENZYMES:No results for input(s): \"CKTOTAL\", \"CKMB\", \"CKMBINDEX\", \"TROPONINI\" in the last 72 hours.  FASTING LIPID PANEL:  Lab Results   Component Value Date/Time    HDL 48 05/25/2022 01:59 AM    TRIG 64 05/25/2022 01:59 AM     LIVER PROFILE:No results for input(s): \"AST\", \"ALT\", \"LABALBU\" in the last 72 hours.

## 2024-12-10 RX ORDER — SENNOSIDES A AND B 8.6 MG/1
2 TABLET, FILM COATED ORAL DAILY
Qty: 60 TABLET | Refills: 3 | Status: SHIPPED | OUTPATIENT
Start: 2024-12-10

## 2024-12-10 NOTE — TELEPHONE ENCOUNTER
Rx Refill Note  Requested Prescriptions     Pending Prescriptions Disp Refills    Senna 8.6 MG tablet       Sig: Take  by mouth Daily.      Last office visit with prescribing clinician: 10/17/2024   Last telemedicine visit with prescribing clinician: Visit date not found   Next office visit with prescribing clinician: Visit date not found                         Would you like a call back once the refill request has been completed: [] Yes [] No    If the office needs to give you a call back, can they leave a voicemail: [] Yes [] No    Mat Kern MA  12/10/24, 10:12 CST

## 2024-12-13 ENCOUNTER — TELEPHONE (OUTPATIENT)
Dept: INTERNAL MEDICINE | Facility: CLINIC | Age: 84
End: 2024-12-13
Payer: MEDICARE

## 2024-12-13 RX ORDER — NIFEDIPINE 60 MG/1
60 TABLET, EXTENDED RELEASE ORAL DAILY
Qty: 90 TABLET | Refills: 1 | Status: SHIPPED | OUTPATIENT
Start: 2024-12-13

## 2024-12-13 NOTE — TELEPHONE ENCOUNTER
"    Caller: Zachariah Fournier \"Festus\"    Relationship: Self    Best call back number: [phone]     Requested Prescriptions:   Requested Prescriptions     Pending Prescriptions Disp Refills    NIFEdipine XL (PROCARDIA XL) 60 MG 24 hr tablet       Sig: Take 1 tablet by mouth Daily.        Pharmacy where request should be sent: [id number] - [facility] [address] - [address] AT [facility] [address] - [phone] [facility] [phone] FX     Last office visit with prescribing clinician: 10/17/2024   Last telemedicine visit with prescribing clinician: Visit date not found   Next office visit with prescribing clinician: Visit date not found     Additional details provided by patient:     Does the patient have less than a 3 day supply:  [x] Yes  [] No    Would you like a call back once the refill request has been completed: [] Yes [x] No    If the office needs to give you a call back, can they leave a voicemail: [] Yes [x] No    Dany Purcell III, Dick Rep   12/13/24 09:10 CST         "

## 2025-01-20 RX ORDER — FOLIC ACID 1 MG/1
1000 TABLET ORAL DAILY
Qty: 90 TABLET | Refills: 1 | Status: SHIPPED | OUTPATIENT
Start: 2025-01-20

## 2025-03-03 DIAGNOSIS — I49.5 SA NODE DYSFUNCTION (HCC): ICD-10-CM

## 2025-03-03 DIAGNOSIS — Z95.0 PACEMAKER: Primary | ICD-10-CM

## 2025-03-17 RX ORDER — TAMSULOSIN HYDROCHLORIDE 0.4 MG/1
0.4 CAPSULE ORAL DAILY
Qty: 90 CAPSULE | Refills: 0 | Status: SHIPPED | OUTPATIENT
Start: 2025-03-17

## 2025-03-18 NOTE — PROGRESS NOTES
Julio C Crouch  [id number]     01/28/21 1029 01/28/21 1030 01/28/21 1031   Restrictions/Precautions   Restrictions/Precautions Up Ad Fanta  --   --    Subjective   Subjective Pt agreed to therapy this morning. --   --    Bed Mobility   Rolling Independent  --   --    Supine to Sit Independent  --   --    Sit to Supine Independent  --   --    Transfers   Sit to Stand Independent  --   --    Stand to sit Independent  --   --    Bed to Chair Independent  --   --    Stand Pivot Transfers   (.)  --   --    Squat Pivot Transfers   (.)  --   --    Lateral Transfers   (.)  --   --    Ambulation   Ambulation?  --  Yes  --    WB Status  --  WBAT BLE'S  --    Ambulation 1   Surface  --  level tile  --    Device  --  Rollator  --    Assistance  --  Independent  --    Quality of Gait  --  Forward flexed posture. --    Distance  --  250' x2  --    Exercises   Comments  --  Standing Ishaan LE ther ex including Sidestepping and Retrowalking in parallel bars. --    Conditions Requiring Skilled Therapeutic Intervention   Body structures, Functions, Activity limitations  --   --  Decreased functional mobility ; Decreased strength;Decreased endurance;Decreased balance;Decreased coordination;Decreased safe awareness   Assessment  --   --  Pt has been cleared for UP AD FANTA. Pt tolerated session well w/ minimal fatigue or pain.    Prognosis  --   --  Good   Activity Tolerance   Activity Tolerance  --   --  Patient Tolerated treatment well   Electronically signed by Igor Jensen PTA on 1/28/2021 at 10:33 AM 164.9

## 2025-05-12 RX ORDER — METOPROLOL SUCCINATE 100 MG/1
100 TABLET, EXTENDED RELEASE ORAL DAILY
OUTPATIENT
Start: 2025-05-12

## 2025-05-12 NOTE — TELEPHONE ENCOUNTER
Rx Refill Note  Requested Prescriptions     Pending Prescriptions Disp Refills    metoprolol succinate XL (TOPROL-XL) 100 MG 24 hr tablet       Sig: Take 1 tablet by mouth Daily.      Last office visit with prescribing clinician: 10/17/2024   Last telemedicine visit with prescribing clinician: Visit date not found   Next office visit with prescribing clinician: 5/29/2025                         Would you like a call back once the refill request has been completed: [] Yes [] No    If the office needs to give you a call back, can they leave a voicemail: [] Yes [] No    Mat Kern MA  05/12/25, 16:12 CDT

## 2025-05-12 NOTE — TELEPHONE ENCOUNTER
"Caller: Zachariah Fournier \"Festus\"    Relationship: Self    Best call back number: [phone]     Requested Prescriptions:   Requested Prescriptions     Pending Prescriptions Disp Refills    metoprolol succinate XL (TOPROL-XL) 100 MG 24 hr tablet       Sig: Take 1 tablet by mouth Daily.        Pharmacy where request should be sent: [id number] - [address] AT [facility] [address] - [phone]  - [phone] FX     Last office visit with prescribing clinician: 10/17/2024   Last telemedicine visit with prescribing clinician: Visit date not found   Next office visit with prescribing clinician: 5/29/2025     Additional details provided by patient: FIRST TIME PRESCRIPTION FOR DR CASPER, IT WAS PREVIOUSLY PRESCRIBED BY OLD PCP     Does the patient have less than a 3 day supply:  [x] Yes  [] No    Would you like a call back once the refill request has been completed: [x] Yes [] No    If the office needs to give you a call back, can they leave a voicemail: [] Yes [] No    Dick Kirk Rep   05/12/25 14:42 CDT           "

## 2025-05-29 ENCOUNTER — OFFICE VISIT (OUTPATIENT)
Dept: INTERNAL MEDICINE | Facility: CLINIC | Age: 85
End: 2025-05-29
Payer: MEDICARE

## 2025-05-29 VITALS
OXYGEN SATURATION: 98 % | SYSTOLIC BLOOD PRESSURE: 165 MMHG | WEIGHT: 223.1 LBS | RESPIRATION RATE: 16 BRPM | HEIGHT: 72 IN | BODY MASS INDEX: 30.22 KG/M2 | HEART RATE: 102 BPM | DIASTOLIC BLOOD PRESSURE: 78 MMHG

## 2025-05-29 DIAGNOSIS — N39.41 URGE INCONTINENCE OF URINE: ICD-10-CM

## 2025-05-29 DIAGNOSIS — E11.22 TYPE 2 DIABETES MELLITUS WITH STAGE 4 CHRONIC KIDNEY DISEASE, WITHOUT LONG-TERM CURRENT USE OF INSULIN: Primary | ICD-10-CM

## 2025-05-29 DIAGNOSIS — I10 PRIMARY HYPERTENSION: ICD-10-CM

## 2025-05-29 DIAGNOSIS — N18.4 STAGE 4 CHRONIC KIDNEY DISEASE: ICD-10-CM

## 2025-05-29 DIAGNOSIS — G47.33 OSA TREATED WITH BIPAP: ICD-10-CM

## 2025-05-29 DIAGNOSIS — N18.4 TYPE 2 DIABETES MELLITUS WITH STAGE 4 CHRONIC KIDNEY DISEASE, WITHOUT LONG-TERM CURRENT USE OF INSULIN: Primary | ICD-10-CM

## 2025-05-29 DIAGNOSIS — Z23 NEED FOR VACCINATION: ICD-10-CM

## 2025-05-29 DIAGNOSIS — I50.32 CHRONIC HEART FAILURE WITH PRESERVED EJECTION FRACTION: ICD-10-CM

## 2025-05-29 DIAGNOSIS — I82.812 SUPERFICIAL THROMBOSIS OF LEG, LEFT: ICD-10-CM

## 2025-05-29 LAB
EXPIRATION DATE: ABNORMAL
HBA1C MFR BLD: 6.4 % (ref 4.5–5.7)
Lab: ABNORMAL

## 2025-05-29 RX ORDER — BUMETANIDE 1 MG/1
TABLET ORAL
Qty: 180 TABLET | Refills: 1 | Status: SHIPPED | OUTPATIENT
Start: 2025-05-29

## 2025-05-29 RX ORDER — AMIODARONE HYDROCHLORIDE 200 MG/1
200 TABLET ORAL DAILY
Qty: 90 TABLET | Refills: 3 | Status: SHIPPED | OUTPATIENT
Start: 2025-05-29

## 2025-05-29 RX ORDER — NIACINAMIDE 500 MG
1 TABLET ORAL EVERY 12 HOURS SCHEDULED
COMMUNITY
Start: 2025-05-06

## 2025-05-29 RX ORDER — NIFEDIPINE 60 MG/1
60 TABLET, EXTENDED RELEASE ORAL DAILY
Qty: 90 TABLET | Refills: 1 | Status: SHIPPED | OUTPATIENT
Start: 2025-05-29

## 2025-05-29 RX ORDER — METOPROLOL SUCCINATE 100 MG/1
100 TABLET, EXTENDED RELEASE ORAL DAILY
Qty: 90 TABLET | Refills: 3 | Status: SHIPPED | OUTPATIENT
Start: 2025-05-29

## 2025-05-29 NOTE — PROGRESS NOTES
CC: fu hypertension    History:  Zachariah Fournier is a 84 y.o. male   History of Present Illness  The patient came in to get checked for a hard, sore spot that has been bothering him for about 2 weeks. He mentioned that it has gotten worse and that he had a similar discoloration about a month ago, which went away on its own. He doesn't remember bumping or injuring the area and is unsure if it might be an abscess.    About 2 months ago, he had a severe illness that he thinks might have been COVID-19. He felt bad in the morning, had intense chills by dinner time, and a fever of 106 degrees by bedtime. The fever went away overnight, but he was bedridden for 48 hours and took about a month to feel okay again. He still isn't walking as well as he used to, although it's getting better. His kidney doctor noticed his kidney function had dropped by 2 points, possibly due to the illness.    He has been experiencing sudden sleep episodes during activities without feeling sleepy beforehand. He wonders if his medications might be causing this. He uses a BiPAP at night and feels rested when he wakes up. He has an appointment with his cardiologist, Dr. Kwon, on June 5th to discuss these sleep issues.    He is on a water pill that makes him need to urinate every 2 hours at night. He plans to see his urologist again, although previous bladder injections only helped temporarily. He takes his diuretic medication at 8:00 AM and right before bed.        ROS:  Review of Systems   Constitutional:  Negative for chills and fever.   Respiratory:  Negative for cough and shortness of breath.    Cardiovascular:  Negative for chest pain and palpitations.   Gastrointestinal:  Negative for abdominal pain and constipation.   Genitourinary:  Negative for difficulty urinating and dysuria.        reports that he quit smoking about 25 years ago. His smoking use included cigarettes. He has never been exposed to tobacco smoke. He has never used  "smokeless tobacco. He reports current alcohol use. He reports that he does not use drugs.      Current Outpatient Medications:     amiodarone (PACERONE) 200 MG tablet, Take 1 tablet by mouth Daily., Disp: 90 tablet, Rfl: 3    aspirin 81 MG chewable tablet, Chew 1 tablet Daily., Disp: , Rfl:     atorvastatin (LIPITOR) 20 MG tablet, Take 1 tablet by mouth Daily., Disp: 90 tablet, Rfl: 3    bumetanide (BUMEX) 1 MG tablet, Take one tablet PO at 8am and 3pm., Disp: 180 tablet, Rfl: 1    clobetasol propionate (Temovate) 0.05 % cream, Apply 1 Application topically to the appropriate area as directed 2 (Two) Times a Day., Disp: 30 g, Rfl: 0    folic acid (FOLVITE) 1 MG tablet, Take 1 tablet by mouth Daily., Disp: 90 tablet, Rfl: 1    glipizide (GLUCOTROL XL) 2.5 MG 24 hr tablet, Take 1 tablet by mouth Daily., Disp: , Rfl:     metOLazone (ZAROXOLYN) 2.5 MG tablet, Take 1 tablet by mouth Every Other Day., Disp: , Rfl:     metoprolol succinate XL (TOPROL-XL) 100 MG 24 hr tablet, Take 1 tablet by mouth Daily., Disp: 90 tablet, Rfl: 3    Mirabegron ER (MYRBETRIQ) 25 MG tablet sustained-release 24 hour 24 hr tablet, Take 1 tablet by mouth Daily., Disp: , Rfl:     niacinamide 500 MG tablet, Take 1 tablet by mouth Every 12 (Twelve) Hours., Disp: , Rfl:     NIFEdipine XL (PROCARDIA XL) 60 MG 24 hr tablet, Take 1 tablet by mouth Daily., Disp: 90 tablet, Rfl: 1    Plecanatide 3 MG tablet, Take 1 tablet by mouth Daily., Disp: , Rfl:     Polypodium Leucotomos (HELIOCARE PO), , Disp: , Rfl:     Senna 8.6 MG tablet, Take 2 tablets by mouth Daily., Disp: 60 tablet, Rfl: 3    tamsulosin (FLOMAX) 0.4 MG capsule 24 hr capsule, Take 1 capsule by mouth Daily., Disp: , Rfl:     vitamin D (ERGOCALCIFEROL) 1.25 MG (97245 UT) capsule capsule, Take 1 capsule by mouth 1 (One) Time Per Week., Disp: , Rfl:     OBJECTIVE:  /78 (BP Location: Right arm, Patient Position: Sitting, Cuff Size: Adult)   Pulse 102   Resp 16   Ht 182.9 cm (72\")   Wt " 101 kg (223 lb 1.6 oz)   SpO2 98%   BMI 30.26 kg/m²    Physical Exam  Constitutional:       General: He is not in acute distress.  Pulmonary:      Effort: Pulmonary effort is normal. No respiratory distress.   Skin:     Comments: Firm subcutaneous mass about 4x5cm in the inner left thigh. There is mild tenderness, but no surrounding erythema, warmth or skin breakdown.    Neurological:      Mental Status: He is alert and oriented to person, place, and time.   Psychiatric:         Mood and Affect: Mood normal.         Behavior: Behavior normal.           Assessment/Plan     Diagnoses and all orders for this visit:    1. Type 2 diabetes mellitus with stage 4 chronic kidney disease, without long-term current use of insulin (Primary)  -     POCT glycated hemoglobin, total  A1c well controlled at 6.4%. Continue meds.     2. Stage 4 chronic kidney disease  Following with Nephrology. Seeking records and labs.     3. Chronic heart failure with preserved ejection fraction  -     bumetanide (BUMEX) 1 MG tablet; Take one tablet PO at 8am and 3pm.  Dispense: 180 tablet; Refill: 1  No evidence of exacerbation. Will change timing of loop diuretic to prevent frequency overnight.     4. Superficial thrombosis of leg, left  Recommend warm compress. There are varicose veins in the area. If not improving in the next 2-3 weeks, consider imaging. Given superficial position, very low concern for DVT.     5. Primary hypertension  -     NIFEdipine XL (PROCARDIA XL) 60 MG 24 hr tablet; Take 1 tablet by mouth Daily.  Dispense: 90 tablet; Refill: 1  -     metoprolol succinate XL (TOPROL-XL) 100 MG 24 hr tablet; Take 1 tablet by mouth Daily.  Dispense: 90 tablet; Refill: 3  Poorly controlled, BP goal for age is <140/90 per JNC 8 guidelines, and restart meds as he has been out >1 week.     6. Urge incontinence of urine  Change timing of loop diuretic as above. Continue Myrbetriq.     7. Need for vaccination  -     Pneumococcal Conjugate  Vaccine 20-Valent (PCV20)    8. FINA treated with BiPAP  Adherent without new symptoms.     Other orders  -     amiodarone (PACERONE) 200 MG tablet; Take 1 tablet by mouth Daily.  Dispense: 90 tablet; Refill: 3      An After Visit Summary was printed and given to the patient at discharge.  Return in about 6 months (around 11/29/2025) for Medicare Wellness.      Patient or patient representative verbalized consent for the use of Ambient Listening during the visit with  Mat Hankins DO for chart documentation. 5/29/2025  20:48 CDT    Mat Hankins D.O. 5/29/2025   Electronically signed.

## 2025-06-02 ENCOUNTER — OFFICE VISIT (OUTPATIENT)
Dept: PULMONOLOGY | Age: 85
End: 2025-06-02
Payer: MEDICARE

## 2025-06-02 VITALS
RESPIRATION RATE: 20 BRPM | TEMPERATURE: 96.8 F | HEIGHT: 72 IN | SYSTOLIC BLOOD PRESSURE: 155 MMHG | WEIGHT: 222 LBS | OXYGEN SATURATION: 94 % | HEART RATE: 60 BPM | DIASTOLIC BLOOD PRESSURE: 78 MMHG | BODY MASS INDEX: 30.07 KG/M2

## 2025-06-02 DIAGNOSIS — J44.1 CHRONIC OBSTRUCTIVE PULMONARY DISEASE WITH (ACUTE) EXACERBATION (HCC): ICD-10-CM

## 2025-06-02 DIAGNOSIS — J96.12 CHRONIC RESPIRATORY FAILURE WITH HYPOXIA AND HYPERCAPNIA (HCC): ICD-10-CM

## 2025-06-02 DIAGNOSIS — R06.02 SOB (SHORTNESS OF BREATH): ICD-10-CM

## 2025-06-02 DIAGNOSIS — J96.11 CHRONIC RESPIRATORY FAILURE WITH HYPOXIA AND HYPERCAPNIA (HCC): ICD-10-CM

## 2025-06-02 DIAGNOSIS — G47.33 OSA TREATED WITH BIPAP: Primary | ICD-10-CM

## 2025-06-02 DIAGNOSIS — I50.32 CHRONIC DIASTOLIC CONGESTIVE HEART FAILURE (HCC): ICD-10-CM

## 2025-06-02 PROCEDURE — G8427 DOCREV CUR MEDS BY ELIG CLIN: HCPCS | Performed by: INTERNAL MEDICINE

## 2025-06-02 PROCEDURE — G8417 CALC BMI ABV UP PARAM F/U: HCPCS | Performed by: INTERNAL MEDICINE

## 2025-06-02 PROCEDURE — 1036F TOBACCO NON-USER: CPT | Performed by: INTERNAL MEDICINE

## 2025-06-02 PROCEDURE — 3078F DIAST BP <80 MM HG: CPT | Performed by: INTERNAL MEDICINE

## 2025-06-02 PROCEDURE — 99213 OFFICE O/P EST LOW 20 MIN: CPT | Performed by: INTERNAL MEDICINE

## 2025-06-02 PROCEDURE — 1159F MED LIST DOCD IN RCRD: CPT | Performed by: INTERNAL MEDICINE

## 2025-06-02 PROCEDURE — 3077F SYST BP >= 140 MM HG: CPT | Performed by: INTERNAL MEDICINE

## 2025-06-02 PROCEDURE — 1123F ACP DISCUSS/DSCN MKR DOCD: CPT | Performed by: INTERNAL MEDICINE

## 2025-06-02 PROCEDURE — 3023F SPIROM DOC REV: CPT | Performed by: INTERNAL MEDICINE

## 2025-06-02 ASSESSMENT — ENCOUNTER SYMPTOMS
ABDOMINAL DISTENTION: 0
SHORTNESS OF BREATH: 1
ABDOMINAL PAIN: 0
CHEST TIGHTNESS: 0
COUGH: 0
BACK PAIN: 0
WHEEZING: 1
RHINORRHEA: 0
APNEA: 0
ANAL BLEEDING: 0

## 2025-06-02 NOTE — PROGRESS NOTES
light-headedness and headaches.       Vitals:    06/02/25 1434   BP: (!) 155/78   Pulse: 60   Resp: 20   Temp: 96.8 °F (36 °C)   SpO2: 94%   Weight: 100.7 kg (222 lb)   Height: 1.829 m (6')      BMI Readings from Last 1 Encounters:   06/02/25 30.11 kg/m²         Physical Exam  Vitals reviewed.   Constitutional:       Appearance: Normal appearance. He is obese.   HENT:      Head: Normocephalic and atraumatic.      Nose: Nose normal.   Eyes:      Extraocular Movements: Extraocular movements intact.      Conjunctiva/sclera: Conjunctivae normal.   Cardiovascular:      Rate and Rhythm: Normal rate and regular rhythm.      Heart sounds: No murmur heard.     No friction rub.   Pulmonary:      Effort: Pulmonary effort is normal. No respiratory distress.      Breath sounds: Normal breath sounds. No stridor. No wheezing, rhonchi or rales.   Abdominal:      General: There is no distension.      Palpations: There is no mass.      Tenderness: There is no abdominal tenderness. There is no guarding or rebound.   Musculoskeletal:      Cervical back: Normal range of motion and neck supple.   Neurological:      Mental Status: He is alert and oriented to person, place, and time.             This note was generated using a voice recognition software. Errors in voice recognition may have occurred.      An electronic signature was used to authenticate this note.    --Jet Holt MD

## 2025-06-10 ENCOUNTER — RESULTS FOLLOW-UP (OUTPATIENT)
Dept: CARDIOLOGY CLINIC | Age: 85
End: 2025-06-10

## 2025-06-10 DIAGNOSIS — I49.5 SA NODE DYSFUNCTION (HCC): ICD-10-CM

## 2025-06-10 DIAGNOSIS — Z95.0 PACEMAKER: Primary | ICD-10-CM

## 2025-06-17 RX ORDER — TAMSULOSIN HYDROCHLORIDE 0.4 MG/1
0.4 CAPSULE ORAL DAILY
Qty: 90 CAPSULE | OUTPATIENT
Start: 2025-06-17

## 2025-06-17 RX ORDER — TAMSULOSIN HYDROCHLORIDE 0.4 MG/1
0.4 CAPSULE ORAL DAILY
Qty: 30 CAPSULE | Refills: 0 | Status: SHIPPED | OUTPATIENT
Start: 2025-06-17

## 2025-06-17 NOTE — TELEPHONE ENCOUNTER
attempted to reach patient to see if he was still following with [facility] and see if they have done surveillance cystoscopy. Patient did not answer so I left VM letting the patient know if he has not a cystoscopy at [facility] to please contact the office so that we can get him scheduled to see Dr. Zuniga as he was due for this in January due to his history of bladder cancer.

## 2025-06-18 ENCOUNTER — APPOINTMENT (OUTPATIENT)
Dept: GENERAL RADIOLOGY | Age: 85
End: 2025-06-18
Payer: MEDICARE

## 2025-06-18 ENCOUNTER — HOSPITAL ENCOUNTER (EMERGENCY)
Age: 85
Discharge: HOME | End: 2025-06-18
Payer: MEDICARE

## 2025-06-18 VITALS
SYSTOLIC BLOOD PRESSURE: 136 MMHG | OXYGEN SATURATION: 95 % | DIASTOLIC BLOOD PRESSURE: 51 MMHG | HEART RATE: 61 BPM | RESPIRATION RATE: 18 BRPM | TEMPERATURE: 98.7 F

## 2025-06-18 DIAGNOSIS — S90.32XA CONTUSION OF LEFT FOOT, INITIAL ENCOUNTER: ICD-10-CM

## 2025-06-18 DIAGNOSIS — Z79.01 CHRONIC ANTICOAGULATION: Primary | ICD-10-CM

## 2025-06-18 DIAGNOSIS — S81.812A LACERATION OF LEFT LOWER EXTREMITY, INITIAL ENCOUNTER: ICD-10-CM

## 2025-06-18 PROCEDURE — 12005 RPR S/N/A/GEN/TRK12.6-20.0CM: CPT

## 2025-06-18 PROCEDURE — 6370000000 HC RX 637 (ALT 250 FOR IP): Performed by: PHYSICIAN ASSISTANT

## 2025-06-18 PROCEDURE — 6360000002 HC RX W HCPCS: Performed by: PHYSICIAN ASSISTANT

## 2025-06-18 PROCEDURE — 73630 X-RAY EXAM OF FOOT: CPT

## 2025-06-18 PROCEDURE — 73590 X-RAY EXAM OF LOWER LEG: CPT

## 2025-06-18 PROCEDURE — 99283 EMERGENCY DEPT VISIT LOW MDM: CPT

## 2025-06-18 RX ORDER — CEFDINIR 300 MG/1
300 CAPSULE ORAL 2 TIMES DAILY
Qty: 14 CAPSULE | Refills: 0 | Status: SHIPPED | OUTPATIENT
Start: 2025-06-18 | End: 2025-06-25

## 2025-06-18 RX ORDER — LIDOCAINE HYDROCHLORIDE AND EPINEPHRINE 10; 10 MG/ML; UG/ML
20 INJECTION, SOLUTION INFILTRATION; PERINEURAL ONCE
Status: COMPLETED | OUTPATIENT
Start: 2025-06-18 | End: 2025-06-18

## 2025-06-18 RX ADMIN — Medication 1 EACH: at 21:45

## 2025-06-18 RX ADMIN — LIDOCAINE HYDROCHLORIDE AND EPINEPHRINE 20 ML: 10; 10 INJECTION, SOLUTION INFILTRATION; PERINEURAL at 21:10

## 2025-06-18 ASSESSMENT — ENCOUNTER SYMPTOMS
BACK PAIN: 0
PHOTOPHOBIA: 0
APNEA: 0
COUGH: 0
SHORTNESS OF BREATH: 0
EYE ITCHING: 0
EYE DISCHARGE: 0
COLOR CHANGE: 0

## 2025-06-19 NOTE — DISCHARGE INSTRUCTIONS
No submerging first 3 days  Keep bandaging compression on for 24 hrs warm water soap can run over tomorrow night  Plan for abx to completion  Can have stitches removed in 10-14 days by ER or derm  Sxs infection redness swelling drainage pain needs medical eval

## 2025-06-19 NOTE — ED NOTES
This RN photographed an image of pt's LLE to pt's chart. Pt's leg was still oozing blood. Leg was dressed with gauze and wrap. This RN checked for pt's pedal pulses. Was able to obtain posterior tibal with doppler, was not able to obtain dorsalis pedis with doppler in the LLE. This RN used the US machine to visualize pt left dorsalis pedis pulse and was able to see the artery pulsating minimally. This RN informed the KORINA Gilliam and informed him of my findings.

## 2025-06-19 NOTE — ED PROVIDER NOTES
[facility] EMERGENCY DEPARTMENT  eMERGENCYdEPARTMENT eNCOUnter      Pt Name: Ashwin Valera  MRN: [id number]  Birthdate 1940  Date of evaluation: 6/18/2025  Provider:KORINA Esposito    CHIEF COMPLAINT       Chief Complaint   Patient presents with    Laceration     Leg laceration to left leg states more skin year          HISTORY OF PRESENT ILLNESS  (Location/Symptom, Timing/Onset, Context/Setting, Quality, Duration, Modifying Factors, Severity.)   Ashwin Valera is a 84 y.o. male who presents to the emergency department left leg laceration trip today cutting his shin it is somewhat deep with bleeding from blood thinners some muscle body exposure this might not do very well as his tissue is very thin.  Additionally he dropped a can of tomato soup on his left foot 3 days ago.    HPI    Nursing Notes were reviewed and I agree.    REVIEW OF SYSTEMS    (2-9 systems for level 4, 10 or more for level 5)     Review of Systems   Constitutional:  Negative for activity change, appetite change, chills and fever.   HENT:  Negative for congestion and dental problem.    Eyes:  Negative for photophobia, discharge and itching.   Respiratory:  Negative for apnea, cough and shortness of breath.    Cardiovascular:  Negative for chest pain.   Musculoskeletal:  Negative for arthralgias, back pain, gait problem, myalgias and neck pain.   Skin:  Positive for wound. Negative for color change, pallor and rash.   Neurological:  Negative for dizziness, seizures and syncope.   Psychiatric/Behavioral:  Negative for agitation. The patient is not nervous/anxious.         Except as noted above the remainder of the review of systems was reviewed and negative.       PAST MEDICAL HISTORY     Past Medical History:   Diagnosis Date    Acute liver failure without hepatic coma 10/23/2018    Back pain     \"with tired legs as a result\"    Bladder cancer (HCC) 12/19/2018    Blood circulation, collateral     Carotid arterial disease     recent

## 2025-06-19 NOTE — ED NOTES
Pt leg wrapped. PA made aware of difficulty finding pulse. X-ray performed no fracture, indicates soft tissue swelling.

## 2025-06-25 RX ORDER — ERGOCALCIFEROL 1.25 MG/1
50000 CAPSULE, LIQUID FILLED ORAL WEEKLY
Qty: 5 CAPSULE | Refills: 1 | Status: CANCELLED | OUTPATIENT
Start: 2025-06-25

## 2025-06-25 RX ORDER — VITAMIN K2 90 MCG
1000 CAPSULE ORAL DAILY
Qty: 90 CAPSULE | Refills: 3 | Status: SHIPPED | OUTPATIENT
Start: 2025-06-25

## 2025-06-25 NOTE — TELEPHONE ENCOUNTER
"    Caller: Zachariah Fournier \"Festus\"    Relationship: Self    Best call back number: [phone]     Requested Prescriptions:   Requested Prescriptions     Pending Prescriptions Disp Refills    vitamin D (ERGOCALCIFEROL) 1.25 MG (42043 UT) capsule capsule 5 capsule      Sig: Take 1 capsule by mouth 1 (One) Time Per Week.        Pharmacy where request should be sent: [facility] #[id number] - [facility] - [address] AT [facility] [address] - [phone]  - [phone] FX     Last office visit with prescribing clinician: 5/29/2025   Last telemedicine visit with prescribing clinician: Visit date not found   Next office visit with prescribing clinician: 12/4/2025     Additional details provided by patient: PATIENT IS OUT OF HIS VITAMIN D2.    Does the patient have less than a 3 day supply:  [x] Yes  [] No    Would you like a call back once the refill request has been completed: [] Yes [x] No    If the office needs to give you a call back, can they leave a voicemail: [x] Yes [] No    Dick Mancia   06/25/25 09:59 CDT           " Pt brought in by ems c/o dizziness and sore throat since Saturday went to cancer center for an \"injection\" to help pt feel better. Pt reports ongoing undiagnosed illness causing weakness and dizziness for the past 3 years.   (-)orthostatics PTA, headache  A&Ox4 ambulatory at home

## 2025-06-30 ENCOUNTER — TELEPHONE (OUTPATIENT)
Dept: INTERNAL MEDICINE | Facility: CLINIC | Age: 85
End: 2025-06-30
Payer: MEDICARE

## 2025-06-30 NOTE — TELEPHONE ENCOUNTER
"    Caller: Zachariah Fournier \"Festus\"    Relationship to patient: Self    Best call back number: [phone] (Mobile), REQUESTING A CALLBACK    Chief complaint: STITCH REMOVAL LEFT LEG, BETWEEN KNEE AND ANKLE    Type of visit: IN OFFICE PROCEDURE    Requested date:  THIS WEEK      Additional notes:ADDITIONALLY, THE PATIENT STATES THAT HE HAS A KNOT ON THE TOP OF THE ARCH OF HIS LEFT FOOT THAT HE WOULD LIKE TO GET LOOKED AT.    THE PATIENT STATES THAT HE WAS DISCHARGED FROM [facility] ON 6/18/25 AND WAS TOLD THE STITCHES NEEDED TO BE REMOVED IN TWO WEEKS.       "

## 2025-07-01 ENCOUNTER — HOSPITAL ENCOUNTER (OUTPATIENT)
Dept: GENERAL RADIOLOGY | Facility: HOSPITAL | Age: 85
Discharge: HOME | End: 2025-07-01
Admitting: INTERNAL MEDICINE
Payer: MEDICARE

## 2025-07-01 ENCOUNTER — OFFICE VISIT (OUTPATIENT)
Dept: INTERNAL MEDICINE | Facility: CLINIC | Age: 85
End: 2025-07-01
Payer: MEDICARE

## 2025-07-01 VITALS
HEIGHT: 72 IN | HEART RATE: 60 BPM | BODY MASS INDEX: 30.26 KG/M2 | DIASTOLIC BLOOD PRESSURE: 81 MMHG | OXYGEN SATURATION: 97 % | RESPIRATION RATE: 16 BRPM | SYSTOLIC BLOOD PRESSURE: 147 MMHG

## 2025-07-01 DIAGNOSIS — S81.802A WOUND OF LEFT LOWER EXTREMITY, INITIAL ENCOUNTER: Primary | ICD-10-CM

## 2025-07-01 DIAGNOSIS — S81.802A WOUND OF LEFT LOWER EXTREMITY, INITIAL ENCOUNTER: ICD-10-CM

## 2025-07-01 DIAGNOSIS — S90.32XA HEMATOMA OF LEFT FOOT: ICD-10-CM

## 2025-07-01 PROCEDURE — 73590 X-RAY EXAM OF LOWER LEG: CPT

## 2025-07-01 RX ORDER — DOXYCYCLINE 100 MG/1
100 CAPSULE ORAL 2 TIMES DAILY
Qty: 20 CAPSULE | Refills: 0 | Status: SHIPPED | OUTPATIENT
Start: 2025-07-01 | End: 2025-07-11

## 2025-07-02 NOTE — PROGRESS NOTES
Procedure   Suture Removal    Date/Time: 7/1/2025 9:11 AM    Performed by: Mat Hankins DO  Authorized by: Mat Hankins DO  Body area: lower extremity  Location details: left lower leg  Wound Appearance: purulent, tender and draining  Sutures Removed: 22  Patient tolerance: patient tolerated the procedure well with no immediate complications  Comments: Sutures placed at [facility]. Further treatment per OV.

## 2025-07-02 NOTE — PROGRESS NOTES
CC: Left leg wound    History:  Zachariah Fournier is a 84 y.o. male   He returns today after being in the ER on June 18 after sustaining a long laceration to his left anterior shin.  He was treated in the ER at [facility], received 22 sutures, had a tetanus shot up-to-date from 2021, and was placed on Omnicef for 7 days.  He returns to the office today for removal of his sutures, but is concerned about ongoing drainage.  He also dropped a can on his left foot.  Previous imaging showed no fracture, but he does have a good deal of soft tissue swelling.  There is dark erythema consistent with hematoma, but no warmth or drainage.      ROS:  Review of Systems   Respiratory:  Negative for shortness of breath.    Cardiovascular:  Negative for chest pain.   Skin:  Positive for color change, rash and wound.        reports that he quit smoking about 25 years ago. His smoking use included cigarettes. He has never been exposed to tobacco smoke. He has never used smokeless tobacco. He reports current alcohol use. He reports that he does not use drugs.      Current Outpatient Medications:     amiodarone (PACERONE) 200 MG tablet, Take 1 tablet by mouth Daily., Disp: 90 tablet, Rfl: 3    aspirin 81 MG chewable tablet, Chew 1 tablet Daily., Disp: , Rfl:     atorvastatin (LIPITOR) 20 MG tablet, Take 1 tablet by mouth Daily., Disp: 90 tablet, Rfl: 3    bumetanide (BUMEX) 1 MG tablet, Take one tablet PO at 8am and 3pm., Disp: 180 tablet, Rfl: 1    Cholecalciferol (Vitamin D3) 1000 units capsule, Take 1,000 Units by mouth Daily., Disp: 90 capsule, Rfl: 3    clobetasol propionate (Temovate) 0.05 % cream, Apply 1 Application topically to the appropriate area as directed 2 (Two) Times a Day., Disp: 30 g, Rfl: 0    folic acid (FOLVITE) 1 MG tablet, Take 1 tablet by mouth Daily., Disp: 90 tablet, Rfl: 1    glipizide (GLUCOTROL XL) 2.5 MG 24 hr tablet, Take 1 tablet by mouth Daily., Disp: , Rfl:     metOLazone (ZAROXOLYN) 2.5 MG tablet, Take 1  "tablet by mouth Every Other Day., Disp: , Rfl:     metoprolol succinate XL (TOPROL-XL) 100 MG 24 hr tablet, Take 1 tablet by mouth Daily., Disp: 90 tablet, Rfl: 3    Mirabegron ER (MYRBETRIQ) 25 MG tablet sustained-release 24 hour 24 hr tablet, Take 1 tablet by mouth Daily., Disp: , Rfl:     niacinamide 500 MG tablet, Take 1 tablet by mouth Every 12 (Twelve) Hours., Disp: , Rfl:     NIFEdipine XL (PROCARDIA XL) 60 MG 24 hr tablet, Take 1 tablet by mouth Daily., Disp: 90 tablet, Rfl: 1    Plecanatide 3 MG tablet, Take 1 tablet by mouth Daily., Disp: , Rfl:     Polypodium Leucotomos (HELIOCARE PO), , Disp: , Rfl:     Senna 8.6 MG tablet, Take 2 tablets by mouth Daily., Disp: 60 tablet, Rfl: 3    tamsulosin (FLOMAX) 0.4 MG capsule 24 hr capsule, Take 1 capsule by mouth Daily., Disp: , Rfl:     vitamin D (ERGOCALCIFEROL) 1.25 MG (57990 UT) capsule capsule, Take 1 capsule by mouth 1 (One) Time Per Week., Disp: , Rfl:     doxycycline (VIBRAMYCIN) 100 MG capsule, Take 1 capsule by mouth 2 (Two) Times a Day for 10 days., Disp: 20 capsule, Rfl: 0    OBJECTIVE:  /81 (BP Location: Left arm, Patient Position: Sitting, Cuff Size: Adult)   Pulse 60   Resp 16   Ht 182.9 cm (72\")   SpO2 97%   BMI 30.26 kg/m²    Physical Exam  Constitutional:       General: He is not in acute distress.  Pulmonary:      Effort: Pulmonary effort is normal. No respiratory distress.   Skin:     Comments: 10 inch vertical, but irregular wound over the anterior shin.  There is some exposed muscle body after debridement of dried blood.  There is some purulent drainage within the wound body and some mild oozing of blood from areas.   Neurological:      Mental Status: He is alert and oriented to person, place, and time.   Psychiatric:         Mood and Affect: Mood normal.         Behavior: Behavior normal.         Assessment/Plan     Diagnoses and all orders for this visit:    1. Wound of left lower extremity, initial encounter (Primary)  -     " Ambulatory Referral to Wound Clinic  -     doxycycline (VIBRAMYCIN) 100 MG capsule; Take 1 capsule by mouth 2 (Two) Times a Day for 10 days.  Dispense: 20 capsule; Refill: 0  -     XR tibia fibula 2 vw left; Future  -     Suture Removal  Chronic illness with worsening. Sutures removed.  Ongoing concern forGiven infection, we will obtain an x-ray to evaluate for possible bony involvement and will treat with doxycycline for both strep and MRSA coverage.  He is referred to the wound clinic for further management.    2. Hematoma of left foot  Imaging reviewed without fracture. Encourage ice and elevation.       An After Visit Summary was printed and given to the patient at discharge.  Return for Next scheduled follow up.      Mat Hankins D.O. 7/2/2025   Electronically signed.

## 2025-07-03 ENCOUNTER — OFFICE VISIT (OUTPATIENT)
Dept: CARDIOLOGY CLINIC | Age: 85
End: 2025-07-03
Payer: MEDICARE

## 2025-07-03 VITALS
BODY MASS INDEX: 30.34 KG/M2 | DIASTOLIC BLOOD PRESSURE: 82 MMHG | SYSTOLIC BLOOD PRESSURE: 124 MMHG | WEIGHT: 224 LBS | HEART RATE: 60 BPM | HEIGHT: 72 IN

## 2025-07-03 DIAGNOSIS — I50.32 CHRONIC DIASTOLIC CONGESTIVE HEART FAILURE (HCC): ICD-10-CM

## 2025-07-03 DIAGNOSIS — R06.02 SOB (SHORTNESS OF BREATH): ICD-10-CM

## 2025-07-03 DIAGNOSIS — I47.29 NONSUSTAINED VENTRICULAR TACHYCARDIA (HCC): ICD-10-CM

## 2025-07-03 DIAGNOSIS — I49.5 SA NODE DYSFUNCTION (HCC): ICD-10-CM

## 2025-07-03 DIAGNOSIS — Z95.0 PACEMAKER: ICD-10-CM

## 2025-07-03 DIAGNOSIS — I10 ESSENTIAL HYPERTENSION: ICD-10-CM

## 2025-07-03 DIAGNOSIS — Z79.899 LONG TERM CURRENT USE OF ANTIARRHYTHMIC DRUG: ICD-10-CM

## 2025-07-03 DIAGNOSIS — I25.10 CORONARY ARTERY DISEASE INVOLVING NATIVE CORONARY ARTERY OF NATIVE HEART WITHOUT ANGINA PECTORIS: ICD-10-CM

## 2025-07-03 DIAGNOSIS — I48.91 ATRIAL FIBRILLATION, UNSPECIFIED TYPE (HCC): ICD-10-CM

## 2025-07-03 DIAGNOSIS — R06.02 SHORTNESS OF BREATH: ICD-10-CM

## 2025-07-03 DIAGNOSIS — I48.91 ATRIAL FIBRILLATION, UNSPECIFIED TYPE (HCC): Primary | ICD-10-CM

## 2025-07-03 LAB
ALBUMIN SERPL-MCNC: 4 G/DL (ref 3.5–5.2)
ALP SERPL-CCNC: 104 U/L (ref 40–129)
ALT SERPL-CCNC: 11 U/L (ref 10–50)
ANION GAP SERPL CALCULATED.3IONS-SCNC: 11 MMOL/L (ref 8–16)
AST SERPL-CCNC: 22 U/L (ref 10–50)
BASOPHILS # BLD: 0 K/UL (ref 0–0.2)
BASOPHILS NFR BLD: 0.5 % (ref 0–1)
BILIRUB SERPL-MCNC: 0.5 MG/DL (ref 0.2–1.2)
BUN SERPL-MCNC: 39 MG/DL (ref 8–23)
CALCIUM SERPL-MCNC: 10 MG/DL (ref 8.8–10.2)
CHLORIDE SERPL-SCNC: 99 MMOL/L (ref 98–107)
CO2 SERPL-SCNC: 29 MMOL/L (ref 22–29)
CREAT SERPL-MCNC: 2.7 MG/DL (ref 0.7–1.2)
EOSINOPHIL # BLD: 0 K/UL (ref 0–0.6)
EOSINOPHIL NFR BLD: 0 % (ref 0–5)
ERYTHROCYTE [DISTWIDTH] IN BLOOD BY AUTOMATED COUNT: 13.1 % (ref 11.5–14.5)
GLUCOSE SERPL-MCNC: 119 MG/DL (ref 70–99)
HCT VFR BLD AUTO: 34.1 % (ref 42–52)
HGB BLD-MCNC: 11.3 G/DL (ref 14–18)
IMM GRANULOCYTES # BLD: 0.1 K/UL
LYMPHOCYTES # BLD: 1.8 K/UL (ref 1.1–4.5)
LYMPHOCYTES NFR BLD: 20.5 % (ref 20–40)
MCH RBC QN AUTO: 30.4 PG (ref 27–31)
MCHC RBC AUTO-ENTMCNC: 33.1 G/DL (ref 33–37)
MCV RBC AUTO: 91.7 FL (ref 80–94)
MONOCYTES # BLD: 0.9 K/UL (ref 0–0.9)
MONOCYTES NFR BLD: 10 % (ref 0–10)
NEUTROPHILS # BLD: 5.9 K/UL (ref 1.5–7.5)
NEUTS SEG NFR BLD: 68.3 % (ref 50–65)
PLATELET # BLD AUTO: 189 K/UL (ref 130–400)
PMV BLD AUTO: 9 FL (ref 9.4–12.4)
POTASSIUM SERPL-SCNC: 4.1 MMOL/L (ref 3.5–5.1)
PROT SERPL-MCNC: 7 G/DL (ref 6.4–8.3)
RBC # BLD AUTO: 3.72 M/UL (ref 4.7–6.1)
SODIUM SERPL-SCNC: 139 MMOL/L (ref 136–145)
WBC # BLD AUTO: 8.7 K/UL (ref 4.8–10.8)

## 2025-07-03 PROCEDURE — 3074F SYST BP LT 130 MM HG: CPT | Performed by: INTERNAL MEDICINE

## 2025-07-03 PROCEDURE — G8417 CALC BMI ABV UP PARAM F/U: HCPCS | Performed by: INTERNAL MEDICINE

## 2025-07-03 PROCEDURE — 99214 OFFICE O/P EST MOD 30 MIN: CPT | Performed by: INTERNAL MEDICINE

## 2025-07-03 PROCEDURE — 1159F MED LIST DOCD IN RCRD: CPT | Performed by: INTERNAL MEDICINE

## 2025-07-03 PROCEDURE — 1036F TOBACCO NON-USER: CPT | Performed by: INTERNAL MEDICINE

## 2025-07-03 PROCEDURE — 3079F DIAST BP 80-89 MM HG: CPT | Performed by: INTERNAL MEDICINE

## 2025-07-03 PROCEDURE — 93000 ELECTROCARDIOGRAM COMPLETE: CPT | Performed by: INTERNAL MEDICINE

## 2025-07-03 PROCEDURE — G8427 DOCREV CUR MEDS BY ELIG CLIN: HCPCS | Performed by: INTERNAL MEDICINE

## 2025-07-03 PROCEDURE — 1123F ACP DISCUSS/DSCN MKR DOCD: CPT | Performed by: INTERNAL MEDICINE

## 2025-07-03 ASSESSMENT — ENCOUNTER SYMPTOMS
NAUSEA: 0
EYES NEGATIVE: 1
GASTROINTESTINAL NEGATIVE: 1
RESPIRATORY NEGATIVE: 1
DIARRHEA: 0
VOMITING: 0
SHORTNESS OF BREATH: 0

## 2025-07-03 NOTE — PROGRESS NOTES
[facility] Progress Note                            Date:  7/3/2025  Patient: Ashwin Valera  Age:  84 y.o., 1940      Reason for evaluation:         SUBJECTIVE:    Returns today for follow-up assessment followed for atrial fibrillation nonsustained ventricular tachycardia pacemaker shortness of breath coronary artery disease hypertension sinus node dysfunction chronic antiarrhythmic drug usage chronic diastolic congestive heart failure.  Shortness of breath is about the same denies chest pain denies any bleeding issues.  Not presently on anticoagulant.  EKG reveals undetermined rhythm and might be a junctional rhythm could be atrial fibrillation nonspecific QRS widening old anteroseptal infarct.  Device interrogated today indicating sinus rhythm atrial fibrillation burden less than 1% longevity 5.5 to 5.9 years.  No echocardiogram since 5/25/2022 revealing at that time ejection fraction 50 to 55% mild aortic stenosis mean gradient 8 mild MR we will go ahead and arrange for a follow-up echocardiogram as well.    Review of Systems   Constitutional: Negative.  Negative for chills, fever and unexpected weight change.   HENT: Negative.     Eyes: Negative.    Respiratory: Negative.  Negative for shortness of breath.    Cardiovascular: Negative.  Negative for chest pain.   Gastrointestinal: Negative.  Negative for diarrhea, nausea and vomiting.   Endocrine: Negative.    Genitourinary: Negative.    Musculoskeletal: Negative.    Skin: Negative.    Neurological: Negative.    All other systems reviewed and are negative.        OBJECTIVE:    /82   Pulse 60   Ht 1.829 m (6')   Wt 101.6 kg (224 lb)   BMI 30.38 kg/m²     Labs:   CBC: No results for input(s): \"WBC\", \"HGB\", \"HCT\", \"PLT\" in the last 72 hours.  BMP:No results for input(s): \"NA\", \"K\", \"CO2\", \"BUN\", \"CREATININE\", \"LABGLOM\", \"GLUCOSE\" in the last 72 hours.  BNP: No results for input(s): \"BNP\" in the last 72 hours.  PT/INR: No results

## 2025-07-10 ENCOUNTER — HOSPITAL ENCOUNTER (OUTPATIENT)
Age: 85
Discharge: HOME | End: 2025-07-10
Attending: INTERNAL MEDICINE
Payer: MEDICARE

## 2025-07-10 VITALS
BODY MASS INDEX: 30.34 KG/M2 | SYSTOLIC BLOOD PRESSURE: 124 MMHG | DIASTOLIC BLOOD PRESSURE: 88 MMHG | HEIGHT: 72 IN | WEIGHT: 224 LBS

## 2025-07-10 DIAGNOSIS — I25.10 CORONARY ARTERY DISEASE INVOLVING NATIVE CORONARY ARTERY OF NATIVE HEART WITHOUT ANGINA PECTORIS: ICD-10-CM

## 2025-07-10 DIAGNOSIS — Z79.899 LONG TERM CURRENT USE OF ANTIARRHYTHMIC DRUG: ICD-10-CM

## 2025-07-10 DIAGNOSIS — R06.02 SHORTNESS OF BREATH: ICD-10-CM

## 2025-07-10 DIAGNOSIS — I48.91 ATRIAL FIBRILLATION, UNSPECIFIED TYPE (HCC): ICD-10-CM

## 2025-07-10 DIAGNOSIS — I49.5 SA NODE DYSFUNCTION (HCC): ICD-10-CM

## 2025-07-10 DIAGNOSIS — I50.32 CHRONIC DIASTOLIC CONGESTIVE HEART FAILURE (HCC): ICD-10-CM

## 2025-07-10 DIAGNOSIS — I10 ESSENTIAL HYPERTENSION: ICD-10-CM

## 2025-07-10 DIAGNOSIS — Z95.0 PACEMAKER: ICD-10-CM

## 2025-07-10 DIAGNOSIS — I47.29 NONSUSTAINED VENTRICULAR TACHYCARDIA (HCC): ICD-10-CM

## 2025-07-10 DIAGNOSIS — R06.02 SOB (SHORTNESS OF BREATH): ICD-10-CM

## 2025-07-10 PROCEDURE — 93306 TTE W/DOPPLER COMPLETE: CPT

## 2025-07-11 LAB
ECHO AO ASC DIAM: 2.8 CM
ECHO AO ASCENDING AORTA INDEX: 1.25 CM/M2
ECHO AO ROOT DIAM: 1.4 CM
ECHO AO ROOT INDEX: 0.63 CM/M2
ECHO AO SINUS VALSALVA DIAM: 2.8 CM
ECHO AO SINUS VALSALVA INDEX: 1.25 CM/M2
ECHO AO ST JNCT DIAM: 2.1 CM
ECHO AV AREA PEAK VELOCITY: 1.6 CM2
ECHO AV AREA VTI: 1.6 CM2
ECHO AV AREA/BSA PEAK VELOCITY: 0.7 CM2/M2
ECHO AV AREA/BSA VTI: 0.7 CM2/M2
ECHO AV MEAN GRADIENT: 7 MMHG
ECHO AV MEAN VELOCITY: 1.3 M/S
ECHO AV PEAK GRADIENT: 15 MMHG
ECHO AV PEAK VELOCITY: 1.9 M/S
ECHO AV VELOCITY RATIO: 0.53
ECHO AV VTI: 45.6 CM
ECHO BSA: 2.27 M2
ECHO EST RA PRESSURE: 3 MMHG
ECHO IVC PROX: 1.4 CM
ECHO LA AREA 2C: 12.5 CM2
ECHO LA AREA 4C: 12.6 CM2
ECHO LA DIAMETER INDEX: 1.61 CM/M2
ECHO LA DIAMETER: 3.6 CM
ECHO LA MAJOR AXIS: 4.8 CM
ECHO LA MINOR AXIS: 4.4 CM
ECHO LA TO AORTIC ROOT RATIO: 2.57
ECHO LA VOL BP: 29 ML (ref 18–58)
ECHO LA VOL MOD A2C: 29 ML (ref 18–58)
ECHO LA VOL MOD A4C: 28 ML (ref 18–58)
ECHO LA VOL/BSA BIPLANE: 13 ML/M2 (ref 16–34)
ECHO LA VOLUME INDEX MOD A2C: 13 ML/M2 (ref 16–34)
ECHO LA VOLUME INDEX MOD A4C: 13 ML/M2 (ref 16–34)
ECHO LV E' LATERAL VELOCITY: 8.05 CM/S
ECHO LV E' SEPTAL VELOCITY: 6.74 CM/S
ECHO LV EDV A2C: 120 ML
ECHO LV EDV A4C: 53 ML
ECHO LV EDV INDEX A4C: 24 ML/M2
ECHO LV EDV NDEX A2C: 54 ML/M2
ECHO LV EF PHYSICIAN: 55 %
ECHO LV EJECTION FRACTION A2C: 53 %
ECHO LV EJECTION FRACTION A4C: 56 %
ECHO LV EJECTION FRACTION BIPLANE: 53 % (ref 55–100)
ECHO LV ESV A2C: 56 ML
ECHO LV ESV A4C: 24 ML
ECHO LV ESV INDEX A2C: 25 ML/M2
ECHO LV ESV INDEX A4C: 11 ML/M2
ECHO LV FRACTIONAL SHORTENING: 27 % (ref 28–44)
ECHO LV INTERNAL DIMENSION DIASTOLE INDEX: 1.96 CM/M2
ECHO LV INTERNAL DIMENSION DIASTOLIC: 4.4 CM (ref 4.2–5.9)
ECHO LV INTERNAL DIMENSION SYSTOLIC INDEX: 1.43 CM/M2
ECHO LV INTERNAL DIMENSION SYSTOLIC: 3.2 CM
ECHO LV IVSD: 1.1 CM (ref 0.6–1)
ECHO LV MASS 2D: 191.3 G (ref 88–224)
ECHO LV MASS INDEX 2D: 85.4 G/M2 (ref 49–115)
ECHO LV POSTERIOR WALL DIASTOLIC: 1.3 CM (ref 0.6–1)
ECHO LV RELATIVE WALL THICKNESS RATIO: 0.59
ECHO LVOT AREA: 3.1 CM2
ECHO LVOT AV VTI INDEX: 0.52
ECHO LVOT DIAM: 2 CM
ECHO LVOT MEAN GRADIENT: 2 MMHG
ECHO LVOT PEAK GRADIENT: 4 MMHG
ECHO LVOT PEAK VELOCITY: 1 M/S
ECHO LVOT STROKE VOLUME INDEX: 33.2 ML/M2
ECHO LVOT SV: 74.4 ML
ECHO LVOT VTI: 23.7 CM
ECHO MV A VELOCITY: 0.98 M/S
ECHO MV AREA VTI: 2.1 CM2
ECHO MV E DECELERATION TIME (DT): 269 MS
ECHO MV E VELOCITY: 0.64 M/S
ECHO MV E/A RATIO: 0.65
ECHO MV E/E' LATERAL: 7.95
ECHO MV E/E' RATIO (AVERAGED): 8.72
ECHO MV E/E' SEPTAL: 9.5
ECHO MV LVOT VTI INDEX: 1.49
ECHO MV MAX VELOCITY: 0.8 M/S
ECHO MV MEAN GRADIENT: 1 MMHG
ECHO MV MEAN VELOCITY: 0.6 M/S
ECHO MV PEAK GRADIENT: 3 MMHG
ECHO MV REGURGITANT PEAK GRADIENT: 88 MMHG
ECHO MV REGURGITANT PEAK VELOCITY: 4.7 M/S
ECHO MV REGURGITANT VTIA: 80.2 CM
ECHO MV VTI: 35.2 CM
ECHO RA AREA 4C: 7.5 CM2
ECHO RA END SYSTOLIC VOLUME APICAL 4 CHAMBER INDEX BSA: 5 ML/M2
ECHO RA VOLUME: 11 ML
ECHO RV BASAL DIMENSION: 3 CM
ECHO RV INTERNAL DIMENSION: 2.6 CM
ECHO RV LONGITUDINAL DIMENSION: 6.5 CM
ECHO RV MID DIMENSION: 1.7 CM
ECHO RV TAPSE: 2 CM (ref 1.7–?)

## 2025-07-14 ENCOUNTER — OFFICE VISIT (OUTPATIENT)
Dept: WOUND CARE | Facility: HOSPITAL | Age: 85
End: 2025-07-14
Payer: MEDICARE

## 2025-07-14 PROCEDURE — G0463 HOSPITAL OUTPT CLINIC VISIT: HCPCS

## 2025-07-21 ENCOUNTER — OFFICE VISIT (OUTPATIENT)
Dept: WOUND CARE | Facility: HOSPITAL | Age: 85
End: 2025-07-21
Payer: MEDICARE

## 2025-07-28 DIAGNOSIS — I50.32 CHRONIC HEART FAILURE WITH PRESERVED EJECTION FRACTION: ICD-10-CM

## 2025-07-28 RX ORDER — ATORVASTATIN CALCIUM 20 MG/1
20 TABLET, FILM COATED ORAL DAILY
Qty: 90 TABLET | Refills: 3 | Status: SHIPPED | OUTPATIENT
Start: 2025-07-28

## 2025-07-28 RX ORDER — BUMETANIDE 1 MG/1
TABLET ORAL
Qty: 180 TABLET | Refills: 1 | Status: SHIPPED | OUTPATIENT
Start: 2025-07-28

## 2025-07-28 NOTE — TELEPHONE ENCOUNTER
"    Caller: Zachariah Fournier \"Festus\"    Relationship: Self    Best call back number: [phone]    Requested Prescriptions:   Requested Prescriptions     Pending Prescriptions Disp Refills    atorvastatin (LIPITOR) 20 MG tablet 90 tablet 3     Sig: Take 1 tablet by mouth Daily.    bumetanide (BUMEX) 1 MG tablet 180 tablet 1     Sig: Take one tablet PO at 8am and 3pm.        Pharmacy where request should be sent: [id number] [place] [address] AT [facility] [address] - [phone] PH - [phone] FX     Last office visit with prescribing clinician: 7/1/2025   Last telemedicine visit with prescribing clinician: Visit date not found   Next office visit with prescribing clinician: 12/4/2025     Additional details provided by patient: PATIENT REQUESTING A 90 DAY SUPPLY     Does the patient have less than a 3 day supply:  [x] Yes  [] No    Would you like a call back once the refill request has been completed: [] Yes [x] No        Dick Veras Rep   07/28/25 10:25 CDT       "

## 2025-07-30 RX ORDER — TAMSULOSIN HYDROCHLORIDE 0.4 MG/1
0.4 CAPSULE ORAL DAILY
Qty: 30 CAPSULE | Refills: 0 | OUTPATIENT
Start: 2025-07-30

## 2025-07-31 ENCOUNTER — OFFICE VISIT (OUTPATIENT)
Dept: WOUND CARE | Facility: HOSPITAL | Age: 85
End: 2025-07-31
Payer: MEDICARE

## 2025-07-31 RX ORDER — SENNOSIDES 8.6 MG/1
2 TABLET ORAL DAILY
Qty: 60 TABLET | Refills: 3 | Status: SHIPPED | OUTPATIENT
Start: 2025-07-31

## 2025-07-31 NOTE — TELEPHONE ENCOUNTER
"    Caller: Zachariah Fournier \"Festus\"    Relationship: Self    Best call back number:     [phone]        Requested Prescriptions:   Requested Prescriptions     Pending Prescriptions Disp Refills    Senna 8.6 MG tablet 60 tablet 3     Sig: Take 2 tablets by mouth Daily.        Pharmacy where request should be sent: [id number] - [facility] - [address] AT [facility] & [facility] [address] - [phone]  - [phone] FX     Last office visit with prescribing clinician: 7/1/2025   Last telemedicine visit with prescribing clinician: Visit date not found   Next office visit with prescribing clinician: 12/4/2025     Additional details provided by patient: PATIENT STATED HE WANTS 90 DAYS & 3 REFILLS, \"SO I DON'T HAVE TO GO THROUGH THIS AGAIN & HAVE TO TALK TO YOU EVERY MONTH\"    Does the patient have less than a 3 day supply:  [x] Yes  [] No    Would you like a call back once the refill request has been completed: [] Yes [x] No    If the office needs to give you a call back, can they leave a voicemail: [] Yes [x] No    Dick Mcdermott   07/31/25 09:42 CDT         "

## 2025-08-05 ENCOUNTER — OFFICE VISIT (OUTPATIENT)
Dept: UROLOGY | Age: 85
End: 2025-08-05
Payer: MEDICARE

## 2025-08-05 VITALS — TEMPERATURE: 98.1 F

## 2025-08-05 DIAGNOSIS — N39.41 URGE INCONTINENCE: ICD-10-CM

## 2025-08-05 DIAGNOSIS — R35.0 URINARY FREQUENCY: ICD-10-CM

## 2025-08-05 DIAGNOSIS — N32.81 OAB (OVERACTIVE BLADDER): ICD-10-CM

## 2025-08-05 DIAGNOSIS — Z85.51 HISTORY OF BLADDER CANCER: Primary | ICD-10-CM

## 2025-08-05 LAB
APPEARANCE FLUID: CLEAR
BILIRUBIN, POC: NORMAL
BLOOD URINE, POC: NORMAL
CLARITY, POC: CLEAR
COLOR, POC: YELLOW
GLUCOSE URINE, POC: NORMAL MG/DL
KETONES, POC: NORMAL MG/DL
LEUKOCYTE EST, POC: NORMAL
NITRITE, POC: NORMAL
PH, POC: 5.5
PROTEIN, POC: NORMAL MG/DL
SPECIFIC GRAVITY, POC: 1.01
UROBILINOGEN, POC: 0.2 MG/DL

## 2025-08-05 PROCEDURE — 1036F TOBACCO NON-USER: CPT | Performed by: NURSE PRACTITIONER

## 2025-08-05 PROCEDURE — 99214 OFFICE O/P EST MOD 30 MIN: CPT | Performed by: NURSE PRACTITIONER

## 2025-08-05 PROCEDURE — G8427 DOCREV CUR MEDS BY ELIG CLIN: HCPCS | Performed by: NURSE PRACTITIONER

## 2025-08-05 PROCEDURE — G8417 CALC BMI ABV UP PARAM F/U: HCPCS | Performed by: NURSE PRACTITIONER

## 2025-08-05 PROCEDURE — 1160F RVW MEDS BY RX/DR IN RCRD: CPT | Performed by: NURSE PRACTITIONER

## 2025-08-05 PROCEDURE — 81002 URINALYSIS NONAUTO W/O SCOPE: CPT | Performed by: NURSE PRACTITIONER

## 2025-08-05 PROCEDURE — 1159F MED LIST DOCD IN RCRD: CPT | Performed by: NURSE PRACTITIONER

## 2025-08-05 PROCEDURE — 1123F ACP DISCUSS/DSCN MKR DOCD: CPT | Performed by: NURSE PRACTITIONER

## 2025-08-05 RX ORDER — MIRABEGRON 25 MG/1
25 TABLET, FILM COATED, EXTENDED RELEASE ORAL DAILY
Qty: 30 TABLET | Refills: 11 | Status: SHIPPED | OUTPATIENT
Start: 2025-08-05

## 2025-08-05 RX ORDER — TAMSULOSIN HYDROCHLORIDE 0.4 MG/1
0.4 CAPSULE ORAL DAILY
Qty: 90 CAPSULE | Refills: 3 | Status: SHIPPED | OUTPATIENT
Start: 2025-08-05

## 2025-08-07 ENCOUNTER — OFFICE VISIT (OUTPATIENT)
Dept: WOUND CARE | Facility: HOSPITAL | Age: 85
End: 2025-08-07
Payer: MEDICARE

## 2025-08-07 ASSESSMENT — ENCOUNTER SYMPTOMS
ABDOMINAL PAIN: 0
ABDOMINAL DISTENTION: 0
BACK PAIN: 0
NAUSEA: 0
VOMITING: 0

## 2025-08-20 ENCOUNTER — OFFICE VISIT (OUTPATIENT)
Dept: WOUND CARE | Facility: HOSPITAL | Age: 85
End: 2025-08-20
Payer: MEDICARE

## (undated) DEVICE — Device: Brand: POWER-FLO®

## (undated) DEVICE — HYDROGEL COATED LATEX FOLEY CATHETER, 5 CC, 3-WAY, 20 FR (6.7 MM): Brand: DOVER

## (undated) DEVICE — KIT URIN CATHETER 16 FR 5 CC M INDWL SIL

## (undated) DEVICE — SHUNT CV SIL EXT LOOP L30CM DIA3X4MM FULL SPR REINF SUNDT

## (undated) DEVICE — MCLASS OSCILLATING SAW BLADE 19 X 1.27 (0.050") X 90 MM: Brand: MCLASS

## (undated) DEVICE — THREE QUARTER SHEET: Brand: CONVERTORS

## (undated) DEVICE — SUTURE VCRL SZ 3-0 L27IN ABSRB UD L26MM SH 1/2 CIR J416H

## (undated) DEVICE — Device: Brand: JELCO

## (undated) DEVICE — STERILE POLYISOPRENE POWDER-FREE SURGICAL GLOVES: Brand: PROTEXIS

## (undated) DEVICE — Z DUPLICATE USE 2738952 SYSTEM VENT M AD NSL PAP DEV HD STRP 2L HYPRINFL BG MRI

## (undated) DEVICE — LARGE BONE HALL BLADE, RECIPROCATOR, 12.5 X 76 X 1.27 MM: Brand: HALL

## (undated) DEVICE — CONTRAST IOTHALAMATE MEGLUMINE 60% 50 ML INJ CONRAY 60

## (undated) DEVICE — MINOR CDS: Brand: MEDLINE INDUSTRIES, INC.

## (undated) DEVICE — BAG URIN DRNAGE 2000ML TB L48IN NDL SAMP ANTIREFLX CHMBR DRN

## (undated) DEVICE — GUIDEWIRE ENDOSCP L150CM DIA0.035IN TIP 3CM PTFE NIT

## (undated) DEVICE — FORCEPS BX L240CM JAW DIA2.4MM ORNG L CAP W/ NDL DISP RAD

## (undated) DEVICE — ENDO KIT,LOURDES HOSPITAL: Brand: MEDLINE INDUSTRIES, INC.

## (undated) DEVICE — JACKSON-PRATT 100CC BULB RESERVOIR: Brand: CARDINAL HEALTH

## (undated) DEVICE — CHLORAPREP 26ML ORANGE

## (undated) DEVICE — BANDAGE COMPR W6INXL12FT SMOOTH FOR LIMB EXSANG ESMARCH

## (undated) DEVICE — CATHETER ADAPTER: Brand: ADDTO

## (undated) DEVICE — SUTURE PERMA-HAND SZ 2-0 L30IN NONABSORBABLE BLK L26MM SH K833H

## (undated) DEVICE — 3M™ IOBAN™ 2 ANTIMICROBIAL INCISE DRAPE 6650EZ: Brand: IOBAN™ 2

## (undated) DEVICE — DRESSING PETROLATUM 2X2IN NON ADH ABSORB

## (undated) DEVICE — SUTURE ABSORBABLE MONOFILAMENT 3-0 SH 27 IN UD PDS + PDP416H

## (undated) DEVICE — SUTURE VCRL SZ 4-0 L18IN ABSRB UD VCRL POLYGLACTIN 910 COAT J109T

## (undated) DEVICE — DISCONTINUED USE 111569 BF APPLICATOR COTN TIP 6IN ST

## (undated) DEVICE — GLOVE SURG SZ 7 L12IN FNGR THK94MIL TRNSLUC YEL LTX HYDRGEL

## (undated) DEVICE — Z DISCONTINUED PER MEDLINE USE 2718072 DRESSING FOAM W5XL12.5CM SIL ADH THN BORDED CNFRM LO PROF

## (undated) DEVICE — SUTURE VCRL SZ 3-0 L18IN ABSRB UD W/O NDL POLYGLACTIN 910 J110T

## (undated) DEVICE — SUTURE MCRYL SZ 4-0 L18IN ABSRB UD L19MM PS-2 3/8 CIR PRIM Y496G

## (undated) DEVICE — PAD,NON-ADHERENT,3X8,STERILE,LF,1/PK: Brand: MEDLINE

## (undated) DEVICE — GAUZE,SPONGE,FLUFF,6"X6.75",STRL,10/TRAY: Brand: MEDLINE

## (undated) DEVICE — CATHETER URET 5FR L70CM OPN END SGL LUMN INJ HUB FLEXIMA

## (undated) DEVICE — SUTURE VCRL SZ 2-0 L27IN ABSRB UD L26MM SH 1/2 CIR J417H

## (undated) DEVICE — SUTURE PROL SZ 6-0 L30IN NONABSORBABLE BLU L9.3MM BV-1 3/8 M8709

## (undated) DEVICE — TOWEL,OR,DSP,ST,BLUE,DLX,4/PK,20PK/CS: Brand: MEDLINE

## (undated) DEVICE — MEDIA CONTRAST INJ VISAPAQUE 50ML 320MG

## (undated) DEVICE — SOLUTION IV IRRIG POUR BRL 0.9% SODIUM CHL 2F7124

## (undated) DEVICE — SUTURE PERMAHAND SZ 2-0 L18IN NONABSORBABLE BLK L26MM FS 685G

## (undated) DEVICE — BIPOLAR SEALER 23-112-1 AQM 6.0: Brand: AQUAMANTYS ®

## (undated) DEVICE — GOWN,PRECEPT,XLNG/XXLARGE,STRL: Brand: MEDLINE

## (undated) DEVICE — SUTURE ETHLN SZ 4-0 L18IN NONABSORBABLE BLK L19MM PS-2 3/8 1667H

## (undated) DEVICE — ELECTRODE LOOP 24FR WIRE DIA0.35MM YEL CUT ANG 1 STEM W/

## (undated) DEVICE — SURGICAL PROCEDURE PACK KNEE TOT DBD CDS LOURDES HOSP LF

## (undated) DEVICE — GLOVE SURG SZ 8.5 L11.6IN FNGR THK12.6MIL CUF THK8.3MIL BRN

## (undated) DEVICE — SYSTEM SKIN CLSR 22CM DERMBND PRINEO

## (undated) DEVICE — SUTURE VCRL SZ 2-0 L36IN ABSRB UD L36MM CT-1 1/2 CIR J945H

## (undated) DEVICE — CLIP INT SM WIDE RED TI TRNSVRS GRV CHEVRON SHP W/ PRECIS

## (undated) DEVICE — ZIMMER® STERILE DISPOSABLE TOURNIQUET CUFF WITH PLC, DUAL PORT, SINGLE BLADDER, 18 IN. (46 CM)

## (undated) DEVICE — BLADE RMR L46MM PAT PILOT H

## (undated) DEVICE — GLOVE SURG SZ 75 L12IN FNGR THK87MIL DK GRN LTX FREE ISOLEX

## (undated) DEVICE — ADHESIVE SKIN CLSR 0.7ML TOP DERMBND ADV

## (undated) DEVICE — SURGICAL PROCEDURE PACK CYTOSCOPY

## (undated) DEVICE — GLOVE SURG SZ 8 L12IN FNGR THK13MIL BRN LTX SYN POLYMER W

## (undated) DEVICE — SUTURE VCRL SZ 1 L18IN ABSRB UD L36MM CT-1 1/2 CIR J841D

## (undated) DEVICE — EVACUATOR URO BLDR W/ ADPT UROVAC

## (undated) DEVICE — SET EXTN TBNG IV STD BOR 30IN NO STPCOCK

## (undated) DEVICE — SOLUTION IV 250ML 0.9% SOD CHL PH 5 INJ USP VIAFLX PLAS

## (undated) DEVICE — DRAIN WND SIL FLAT RADIOPAQUE 10MM FULL FLUTED

## (undated) DEVICE — Z INACTIVE USE 2535480 CLIP LIG M BLU TI HRT SHP WIRE HORZ 180 PER BX

## (undated) DEVICE — SURGICAL PROCEDURE PACK VASC LOURDES HOSP

## (undated) DEVICE — STOPCOCK IV PRIMING 0.26ML 3 W W/ TWO FEM LUERLOK PRT 1

## (undated) DEVICE — Z INACTIVE USE 2660664 SOLUTION IRRIG 3000ML 0.9% SOD CHL USP UROMATIC PLAS CONT

## (undated) DEVICE — BAG DRNGE COMB PK

## (undated) DEVICE — Device: Brand: LEVEL 1

## (undated) DEVICE — STERILE LATEX POWDER FREE SURGICAL GLOVES WITH HYDROGEL COATING: Brand: PROTEXIS

## (undated) DEVICE — ARM BOARD PAD: Brand: DEVON

## (undated) DEVICE — BANDAGE COMPR W4INXL15FT BGE E SGL LAYERED CLP CLSR

## (undated) DEVICE — C-ARM: Brand: UNBRANDED

## (undated) DEVICE — 3M™ STERI-DRAPE™ INSTRUMENT POUCH 1018: Brand: STERI-DRAPE™

## (undated) DEVICE — SYRINGE,PISTON,IRRIGATION,60ML,STERILE: Brand: MEDLINE

## (undated) DEVICE — NON-WOVEN ADHESIVE WOUND DRESSING: Brand: PRIMAPORE ADHESIVE DRESSING 30*10CM

## (undated) DEVICE — FORCEPS BX L240CM DIA2.4MM L NDL RAD JAW 4 133340

## (undated) DEVICE — DRESSING FOAM W10XL10CM ABSRB ANTIMIC SAFETAC TECHNOLOGY

## (undated) DEVICE — U-DRAPE: Brand: CONVERTORS

## (undated) DEVICE — DRAPE,EXTREMITY,89X128,STERILE: Brand: MEDLINE

## (undated) DEVICE — GLOVE SURG SZ 85 L12IN FNGR THK13MIL BRN LTX SYN POLYMER W